# Patient Record
Sex: MALE | Race: WHITE | Employment: OTHER | URBAN - NONMETROPOLITAN AREA
[De-identification: names, ages, dates, MRNs, and addresses within clinical notes are randomized per-mention and may not be internally consistent; named-entity substitution may affect disease eponyms.]

---

## 2017-01-04 ENCOUNTER — TELEPHONE (OUTPATIENT)
Dept: INTERNAL MEDICINE | Age: 47
End: 2017-01-04

## 2017-01-17 ENCOUNTER — TELEPHONE (OUTPATIENT)
Dept: ENDOCRINOLOGY | Age: 47
End: 2017-01-17

## 2017-01-18 ENCOUNTER — TELEPHONE (OUTPATIENT)
Dept: OTHER | Age: 47
End: 2017-01-18

## 2017-01-20 DIAGNOSIS — E78.5 HYPERLIPIDEMIA WITH TARGET LDL LESS THAN 100: Primary | ICD-10-CM

## 2017-01-20 PROBLEM — G56.01 CARPAL TUNNEL SYNDROME OF RIGHT WRIST: Status: ACTIVE | Noted: 2017-01-20

## 2017-01-20 PROBLEM — M75.21 BICIPITAL TENDINITIS OF RIGHT SHOULDER: Status: ACTIVE | Noted: 2017-01-20

## 2017-01-20 RX ORDER — OMEGA-3-ACID ETHYL ESTERS 1 G/1
1 CAPSULE, LIQUID FILLED ORAL 2 TIMES DAILY
Qty: 60 CAPSULE | Refills: 3 | Status: SHIPPED | OUTPATIENT
Start: 2017-01-20 | End: 2017-03-16

## 2017-01-21 PROBLEM — E66.9 OBESITY (BMI 30-39.9): Chronic | Status: ACTIVE | Noted: 2017-01-21

## 2017-02-01 ENCOUNTER — TELEPHONE (OUTPATIENT)
Dept: ENDOCRINOLOGY | Age: 47
End: 2017-02-01

## 2017-02-27 ENCOUNTER — TELEPHONE (OUTPATIENT)
Dept: INTERNAL MEDICINE | Age: 47
End: 2017-02-27

## 2017-03-16 ENCOUNTER — OFFICE VISIT (OUTPATIENT)
Dept: INTERNAL MEDICINE | Age: 47
End: 2017-03-16

## 2017-03-16 VITALS
WEIGHT: 285 LBS | HEART RATE: 100 BPM | BODY MASS INDEX: 35.43 KG/M2 | SYSTOLIC BLOOD PRESSURE: 122 MMHG | HEIGHT: 75 IN | DIASTOLIC BLOOD PRESSURE: 90 MMHG

## 2017-03-16 DIAGNOSIS — G25.81 RESTLESS LEGS SYNDROME (RLS): ICD-10-CM

## 2017-03-16 DIAGNOSIS — G62.9 NEUROPATHY: ICD-10-CM

## 2017-03-16 DIAGNOSIS — K21.9 GASTROESOPHAGEAL REFLUX DISEASE WITHOUT ESOPHAGITIS: ICD-10-CM

## 2017-03-16 DIAGNOSIS — E78.2 MIXED HYPERLIPIDEMIA: ICD-10-CM

## 2017-03-16 DIAGNOSIS — I10 ESSENTIAL HYPERTENSION: Primary | ICD-10-CM

## 2017-03-16 DIAGNOSIS — F99 INSOMNIA DUE TO OTHER MENTAL DISORDER: ICD-10-CM

## 2017-03-16 DIAGNOSIS — R11.2 NON-INTRACTABLE VOMITING WITH NAUSEA, UNSPECIFIED VOMITING TYPE: ICD-10-CM

## 2017-03-16 DIAGNOSIS — F51.05 INSOMNIA DUE TO OTHER MENTAL DISORDER: ICD-10-CM

## 2017-03-16 PROCEDURE — G8484 FLU IMMUNIZE NO ADMIN: HCPCS | Performed by: INTERNAL MEDICINE

## 2017-03-16 PROCEDURE — 1036F TOBACCO NON-USER: CPT | Performed by: INTERNAL MEDICINE

## 2017-03-16 PROCEDURE — G8417 CALC BMI ABV UP PARAM F/U: HCPCS | Performed by: INTERNAL MEDICINE

## 2017-03-16 PROCEDURE — 99213 OFFICE O/P EST LOW 20 MIN: CPT | Performed by: INTERNAL MEDICINE

## 2017-03-16 PROCEDURE — G8427 DOCREV CUR MEDS BY ELIG CLIN: HCPCS | Performed by: INTERNAL MEDICINE

## 2017-03-16 RX ORDER — CYCLOBENZAPRINE HCL 10 MG
10 TABLET ORAL 3 TIMES DAILY PRN
COMMUNITY
End: 2017-04-10 | Stop reason: ALTCHOICE

## 2017-03-16 RX ORDER — HYDROCODONE BITARTRATE AND ACETAMINOPHEN 5; 325 MG/1; MG/1
1 TABLET ORAL EVERY 6 HOURS PRN
COMMUNITY
End: 2017-04-06 | Stop reason: ALTCHOICE

## 2017-03-17 RX ORDER — PROMETHAZINE HYDROCHLORIDE 12.5 MG/1
12.5-25 TABLET ORAL EVERY 8 HOURS PRN
Qty: 60 TABLET | Refills: 2 | Status: SHIPPED | OUTPATIENT
Start: 2017-03-17 | End: 2017-12-07 | Stop reason: SDUPTHER

## 2017-03-17 RX ORDER — METOPROLOL TARTRATE 100 MG/1
100 TABLET ORAL 2 TIMES DAILY
Qty: 180 TABLET | Refills: 1 | Status: SHIPPED | OUTPATIENT
Start: 2017-03-17 | End: 2017-12-07 | Stop reason: ALTCHOICE

## 2017-03-17 RX ORDER — FENOFIBRATE 145 MG/1
145 TABLET, COATED ORAL DAILY
Qty: 90 TABLET | Refills: 1 | Status: SHIPPED | OUTPATIENT
Start: 2017-03-17 | End: 2017-12-07 | Stop reason: SDUPTHER

## 2017-03-17 RX ORDER — PANTOPRAZOLE SODIUM 40 MG/1
40 TABLET, DELAYED RELEASE ORAL 2 TIMES DAILY
Qty: 180 TABLET | Refills: 1 | Status: SHIPPED | OUTPATIENT
Start: 2017-03-17 | End: 2017-08-17 | Stop reason: SDUPTHER

## 2017-03-17 RX ORDER — ROPINIROLE 1 MG/1
TABLET, FILM COATED ORAL
Qty: 90 TABLET | Refills: 5 | Status: SHIPPED | OUTPATIENT
Start: 2017-03-17 | End: 2017-08-17 | Stop reason: SDUPTHER

## 2017-03-17 RX ORDER — GABAPENTIN 600 MG/1
600 TABLET ORAL 4 TIMES DAILY
Qty: 360 TABLET | Refills: 1 | Status: SHIPPED | OUTPATIENT
Start: 2017-03-17 | End: 2017-08-17 | Stop reason: SDUPTHER

## 2017-04-03 ENCOUNTER — TELEPHONE (OUTPATIENT)
Dept: INTERNAL MEDICINE | Age: 47
End: 2017-04-03

## 2017-04-06 ENCOUNTER — OFFICE VISIT (OUTPATIENT)
Dept: ENDOCRINOLOGY | Age: 47
End: 2017-04-06

## 2017-04-06 VITALS
HEART RATE: 89 BPM | SYSTOLIC BLOOD PRESSURE: 121 MMHG | BODY MASS INDEX: 36.06 KG/M2 | WEIGHT: 290 LBS | HEIGHT: 75 IN | DIASTOLIC BLOOD PRESSURE: 74 MMHG | RESPIRATION RATE: 18 BRPM

## 2017-04-06 DIAGNOSIS — G62.9 NEUROPATHY: ICD-10-CM

## 2017-04-06 DIAGNOSIS — E11.40 TYPE 2 DIABETES MELLITUS WITH DIABETIC NEUROPATHY, UNSPECIFIED LONG TERM INSULIN USE STATUS: Primary | ICD-10-CM

## 2017-04-06 DIAGNOSIS — E78.5 HYPERLIPIDEMIA WITH TARGET LDL LESS THAN 100: ICD-10-CM

## 2017-04-06 PROCEDURE — G8427 DOCREV CUR MEDS BY ELIG CLIN: HCPCS | Performed by: INTERNAL MEDICINE

## 2017-04-06 PROCEDURE — G8417 CALC BMI ABV UP PARAM F/U: HCPCS | Performed by: INTERNAL MEDICINE

## 2017-04-06 PROCEDURE — 3045F PR MOST RECENT HEMOGLOBIN A1C LEVEL 7.0-9.0%: CPT | Performed by: INTERNAL MEDICINE

## 2017-04-06 PROCEDURE — 99214 OFFICE O/P EST MOD 30 MIN: CPT | Performed by: INTERNAL MEDICINE

## 2017-04-06 PROCEDURE — 1036F TOBACCO NON-USER: CPT | Performed by: INTERNAL MEDICINE

## 2017-04-06 RX ORDER — OMEGA-3-ACID ETHYL ESTERS 1 G/1
2 CAPSULE, LIQUID FILLED ORAL 2 TIMES DAILY
Qty: 120 CAPSULE | Refills: 3 | Status: SHIPPED | OUTPATIENT
Start: 2017-04-06 | End: 2017-05-15 | Stop reason: SDUPTHER

## 2017-04-10 ENCOUNTER — OFFICE VISIT (OUTPATIENT)
Dept: INTERNAL MEDICINE | Age: 47
End: 2017-04-10

## 2017-04-10 VITALS
WEIGHT: 285 LBS | HEIGHT: 75 IN | HEART RATE: 80 BPM | SYSTOLIC BLOOD PRESSURE: 128 MMHG | DIASTOLIC BLOOD PRESSURE: 90 MMHG | BODY MASS INDEX: 35.43 KG/M2

## 2017-04-10 DIAGNOSIS — M54.2 CERVICAL SPINE PAIN: Primary | ICD-10-CM

## 2017-04-10 DIAGNOSIS — M48.02 CERVICAL STENOSIS OF SPINE: ICD-10-CM

## 2017-04-10 DIAGNOSIS — R22.31 LUMP OF AXILLA, RIGHT: ICD-10-CM

## 2017-04-10 PROCEDURE — G8427 DOCREV CUR MEDS BY ELIG CLIN: HCPCS | Performed by: INTERNAL MEDICINE

## 2017-04-10 PROCEDURE — 1036F TOBACCO NON-USER: CPT | Performed by: INTERNAL MEDICINE

## 2017-04-10 PROCEDURE — G8417 CALC BMI ABV UP PARAM F/U: HCPCS | Performed by: INTERNAL MEDICINE

## 2017-04-10 PROCEDURE — 99213 OFFICE O/P EST LOW 20 MIN: CPT | Performed by: INTERNAL MEDICINE

## 2017-04-10 RX ORDER — MIRTAZAPINE 15 MG/1
15 TABLET, FILM COATED ORAL NIGHTLY
COMMUNITY
End: 2017-07-18

## 2017-05-09 ENCOUNTER — TELEPHONE (OUTPATIENT)
Dept: ENDOCRINOLOGY | Age: 47
End: 2017-05-09

## 2017-05-12 ENCOUNTER — TELEPHONE (OUTPATIENT)
Dept: ENDOCRINOLOGY | Age: 47
End: 2017-05-12

## 2017-05-15 ENCOUNTER — OFFICE VISIT (OUTPATIENT)
Dept: ENDOCRINOLOGY | Age: 47
End: 2017-05-15

## 2017-05-15 VITALS
HEIGHT: 75 IN | DIASTOLIC BLOOD PRESSURE: 85 MMHG | SYSTOLIC BLOOD PRESSURE: 136 MMHG | BODY MASS INDEX: 35.99 KG/M2 | HEART RATE: 95 BPM | RESPIRATION RATE: 16 BRPM | WEIGHT: 289.5 LBS

## 2017-05-15 DIAGNOSIS — E78.1 HYPERTRIGLYCERIDEMIA: ICD-10-CM

## 2017-05-15 DIAGNOSIS — E11.65 TYPE 2 DIABETES MELLITUS WITH HYPERGLYCEMIA, WITH LONG-TERM CURRENT USE OF INSULIN (HCC): Primary | ICD-10-CM

## 2017-05-15 DIAGNOSIS — E11.40 TYPE 2 DIABETES MELLITUS WITH DIABETIC NEUROPATHY, UNSPECIFIED LONG TERM INSULIN USE STATUS: ICD-10-CM

## 2017-05-15 DIAGNOSIS — Z79.4 TYPE 2 DIABETES MELLITUS WITH HYPERGLYCEMIA, WITH LONG-TERM CURRENT USE OF INSULIN (HCC): Primary | ICD-10-CM

## 2017-05-15 PROCEDURE — G8417 CALC BMI ABV UP PARAM F/U: HCPCS | Performed by: CLINICAL NURSE SPECIALIST

## 2017-05-15 PROCEDURE — 3045F PR MOST RECENT HEMOGLOBIN A1C LEVEL 7.0-9.0%: CPT | Performed by: CLINICAL NURSE SPECIALIST

## 2017-05-15 PROCEDURE — 99214 OFFICE O/P EST MOD 30 MIN: CPT | Performed by: CLINICAL NURSE SPECIALIST

## 2017-05-15 PROCEDURE — 1036F TOBACCO NON-USER: CPT | Performed by: CLINICAL NURSE SPECIALIST

## 2017-05-15 PROCEDURE — G8427 DOCREV CUR MEDS BY ELIG CLIN: HCPCS | Performed by: CLINICAL NURSE SPECIALIST

## 2017-05-15 RX ORDER — OMEGA-3-ACID ETHYL ESTERS 1 G/1
2 CAPSULE, LIQUID FILLED ORAL 2 TIMES DAILY
Qty: 60 CAPSULE | Refills: 3 | Status: SHIPPED | OUTPATIENT
Start: 2017-05-15 | End: 2017-05-16

## 2017-05-15 RX ORDER — OMEGA-3-ACID ETHYL ESTERS 1 G/1
2 CAPSULE, LIQUID FILLED ORAL 2 TIMES DAILY
Qty: 60 CAPSULE | Refills: 3 | Status: SHIPPED | OUTPATIENT
Start: 2017-05-15 | End: 2017-05-15 | Stop reason: SDUPTHER

## 2017-05-15 ASSESSMENT — ENCOUNTER SYMPTOMS
SHORTNESS OF BREATH: 0
DIARRHEA: 0
VOICE CHANGE: 0
EYE REDNESS: 0
COUGH: 0
ABDOMINAL PAIN: 0
CONSTIPATION: 0
CHEST TIGHTNESS: 0
NAUSEA: 0
WHEEZING: 0

## 2017-05-16 ENCOUNTER — TELEPHONE (OUTPATIENT)
Dept: ENDOCRINOLOGY | Age: 47
End: 2017-05-16

## 2017-05-16 DIAGNOSIS — E11.40 TYPE 2 DIABETES MELLITUS WITH DIABETIC NEUROPATHY, UNSPECIFIED LONG TERM INSULIN USE STATUS: Primary | ICD-10-CM

## 2017-05-16 RX ORDER — OMEGA-3-ACID ETHYL ESTERS 1 G/1
2 CAPSULE, LIQUID FILLED ORAL 2 TIMES DAILY
Qty: 120 CAPSULE | Refills: 3 | Status: SHIPPED | OUTPATIENT
Start: 2017-05-16 | End: 2018-10-16 | Stop reason: SDUPTHER

## 2017-05-20 DIAGNOSIS — E78.5 HYPERLIPIDEMIA WITH TARGET LDL LESS THAN 100: ICD-10-CM

## 2017-05-22 RX ORDER — ATORVASTATIN CALCIUM 10 MG/1
TABLET, FILM COATED ORAL
Qty: 30 TABLET | Refills: 5 | Status: SHIPPED | OUTPATIENT
Start: 2017-05-22 | End: 2018-10-16 | Stop reason: SDUPTHER

## 2017-05-23 ENCOUNTER — TELEPHONE (OUTPATIENT)
Dept: ENDOCRINOLOGY | Age: 47
End: 2017-05-23

## 2017-05-23 DIAGNOSIS — Z79.4 UNCONTROLLED TYPE 2 DIABETES MELLITUS WITH HYPERGLYCEMIA, WITH LONG-TERM CURRENT USE OF INSULIN (HCC): Primary | ICD-10-CM

## 2017-05-23 DIAGNOSIS — E11.65 UNCONTROLLED TYPE 2 DIABETES MELLITUS WITH HYPERGLYCEMIA, WITH LONG-TERM CURRENT USE OF INSULIN (HCC): Primary | ICD-10-CM

## 2017-05-30 ENCOUNTER — TELEPHONE (OUTPATIENT)
Dept: INTERNAL MEDICINE | Age: 47
End: 2017-05-30

## 2017-06-22 ENCOUNTER — TELEPHONE (OUTPATIENT)
Dept: ENDOCRINOLOGY | Age: 47
End: 2017-06-22

## 2017-06-27 ENCOUNTER — TELEPHONE (OUTPATIENT)
Dept: ENDOCRINOLOGY | Age: 47
End: 2017-06-27

## 2017-06-27 DIAGNOSIS — E11.40 TYPE 2 DIABETES MELLITUS WITH DIABETIC NEUROPATHY, UNSPECIFIED LONG TERM INSULIN USE STATUS: Primary | ICD-10-CM

## 2017-07-07 PROBLEM — R22.30 LUMP OF AXILLA: Status: ACTIVE | Noted: 2017-07-07

## 2017-07-11 ENCOUNTER — TELEPHONE (OUTPATIENT)
Dept: INTERNAL MEDICINE | Age: 47
End: 2017-07-11

## 2017-08-17 ENCOUNTER — OFFICE VISIT (OUTPATIENT)
Dept: INTERNAL MEDICINE CLINIC | Age: 47
End: 2017-08-17
Payer: MEDICARE

## 2017-08-17 VITALS
SYSTOLIC BLOOD PRESSURE: 114 MMHG | BODY MASS INDEX: 34.69 KG/M2 | WEIGHT: 279 LBS | HEIGHT: 75 IN | HEART RATE: 70 BPM | DIASTOLIC BLOOD PRESSURE: 72 MMHG

## 2017-08-17 DIAGNOSIS — G25.81 RESTLESS LEGS SYNDROME (RLS): ICD-10-CM

## 2017-08-17 DIAGNOSIS — K21.9 GASTROESOPHAGEAL REFLUX DISEASE WITHOUT ESOPHAGITIS: ICD-10-CM

## 2017-08-17 DIAGNOSIS — E78.5 HYPERLIPIDEMIA WITH TARGET LDL LESS THAN 100: ICD-10-CM

## 2017-08-17 DIAGNOSIS — I10 ESSENTIAL HYPERTENSION: ICD-10-CM

## 2017-08-17 DIAGNOSIS — R79.89 LOW TESTOSTERONE LEVEL IN MALE: Primary | ICD-10-CM

## 2017-08-17 DIAGNOSIS — G62.9 NEUROPATHY: ICD-10-CM

## 2017-08-17 DIAGNOSIS — Z12.5 PROSTATE CANCER SCREENING: ICD-10-CM

## 2017-08-17 PROCEDURE — 99214 OFFICE O/P EST MOD 30 MIN: CPT | Performed by: INTERNAL MEDICINE

## 2017-08-17 PROCEDURE — 1036F TOBACCO NON-USER: CPT | Performed by: INTERNAL MEDICINE

## 2017-08-17 PROCEDURE — G8417 CALC BMI ABV UP PARAM F/U: HCPCS | Performed by: INTERNAL MEDICINE

## 2017-08-17 PROCEDURE — G8427 DOCREV CUR MEDS BY ELIG CLIN: HCPCS | Performed by: INTERNAL MEDICINE

## 2017-08-17 RX ORDER — ROPINIROLE 1 MG/1
TABLET, FILM COATED ORAL
Qty: 90 TABLET | Refills: 5 | Status: SHIPPED | OUTPATIENT
Start: 2017-08-17 | End: 2017-10-05 | Stop reason: SDUPTHER

## 2017-08-17 RX ORDER — GABAPENTIN 600 MG/1
600 TABLET ORAL 4 TIMES DAILY
Qty: 360 TABLET | Refills: 1 | Status: SHIPPED | OUTPATIENT
Start: 2017-08-17 | End: 2017-10-05 | Stop reason: SDUPTHER

## 2017-08-17 RX ORDER — PANTOPRAZOLE SODIUM 40 MG/1
40 TABLET, DELAYED RELEASE ORAL 2 TIMES DAILY
Qty: 180 TABLET | Refills: 1 | Status: SHIPPED | OUTPATIENT
Start: 2017-08-17 | End: 2017-12-07 | Stop reason: SDUPTHER

## 2017-08-17 RX ORDER — LACOSAMIDE 50 MG/1
250 TABLET ORAL 2 TIMES DAILY
COMMUNITY
Start: 2017-07-19 | End: 2019-04-16 | Stop reason: DRUGHIGH

## 2017-08-18 ENCOUNTER — HOSPITAL ENCOUNTER (OUTPATIENT)
Age: 47
Discharge: HOME | End: 2017-08-18
Payer: MEDICARE

## 2017-08-18 DIAGNOSIS — Z12.5 PROSTATE CANCER SCREENING: ICD-10-CM

## 2017-08-18 DIAGNOSIS — R79.89 LOW TESTOSTERONE LEVEL IN MALE: ICD-10-CM

## 2017-08-18 LAB — PROSTATE SPECIFIC ANTIGEN: 0.28 NG/ML (ref 0–1)

## 2017-08-18 PROCEDURE — 84270 ASSAY OF SEX HORMONE GLOBUL: CPT

## 2017-08-18 PROCEDURE — 36415 COLL VENOUS BLD VENIPUNCTURE: CPT

## 2017-08-18 PROCEDURE — G0103 PSA SCREENING: HCPCS

## 2017-08-18 PROCEDURE — 84403 ASSAY OF TOTAL TESTOSTERONE: CPT

## 2017-08-19 LAB — TESTOSTERONE FREE: NORMAL

## 2017-08-21 ENCOUNTER — TELEPHONE (OUTPATIENT)
Dept: INTERNAL MEDICINE CLINIC | Age: 47
End: 2017-08-21

## 2017-08-31 ENCOUNTER — TELEPHONE (OUTPATIENT)
Dept: INTERNAL MEDICINE CLINIC | Age: 47
End: 2017-08-31

## 2017-08-31 ENCOUNTER — CARE COORDINATION (OUTPATIENT)
Dept: CARE COORDINATION | Age: 47
End: 2017-08-31

## 2017-08-31 RX ORDER — SILDENAFIL 50 MG/1
50 TABLET, FILM COATED ORAL PRN
COMMUNITY
End: 2017-09-05 | Stop reason: DRUGHIGH

## 2017-09-05 RX ORDER — SILDENAFIL 100 MG/1
100 TABLET, FILM COATED ORAL PRN
COMMUNITY
End: 2017-09-05 | Stop reason: SDUPTHER

## 2017-09-05 RX ORDER — SILDENAFIL 100 MG/1
100 TABLET, FILM COATED ORAL PRN
Qty: 45 TABLET | Refills: 3 | Status: SHIPPED | OUTPATIENT
Start: 2017-09-05 | End: 2019-01-29 | Stop reason: SDUPTHER

## 2017-10-05 ENCOUNTER — OFFICE VISIT (OUTPATIENT)
Dept: ENDOCRINOLOGY | Age: 47
End: 2017-10-05
Payer: MEDICARE

## 2017-10-05 VITALS
SYSTOLIC BLOOD PRESSURE: 126 MMHG | WEIGHT: 271 LBS | RESPIRATION RATE: 20 BRPM | BODY MASS INDEX: 33.69 KG/M2 | DIASTOLIC BLOOD PRESSURE: 79 MMHG | HEIGHT: 75 IN | HEART RATE: 77 BPM

## 2017-10-05 DIAGNOSIS — E78.2 MIXED HYPERLIPIDEMIA: ICD-10-CM

## 2017-10-05 DIAGNOSIS — R79.89 LOW VITAMIN D LEVEL: ICD-10-CM

## 2017-10-05 DIAGNOSIS — G25.81 RESTLESS LEGS SYNDROME (RLS): ICD-10-CM

## 2017-10-05 DIAGNOSIS — Z79.4 TYPE 2 DIABETES MELLITUS WITH DIABETIC NEUROPATHY, WITH LONG-TERM CURRENT USE OF INSULIN (HCC): Primary | ICD-10-CM

## 2017-10-05 DIAGNOSIS — F33.42 RECURRENT MAJOR DEPRESSIVE DISORDER, IN FULL REMISSION (HCC): ICD-10-CM

## 2017-10-05 DIAGNOSIS — G62.9 NEUROPATHY: ICD-10-CM

## 2017-10-05 DIAGNOSIS — E11.40 TYPE 2 DIABETES MELLITUS WITH DIABETIC NEUROPATHY, WITH LONG-TERM CURRENT USE OF INSULIN (HCC): Primary | ICD-10-CM

## 2017-10-05 DIAGNOSIS — I15.2 HYPERTENSION ASSOCIATED WITH DIABETES (HCC): ICD-10-CM

## 2017-10-05 DIAGNOSIS — E11.59 HYPERTENSION ASSOCIATED WITH DIABETES (HCC): ICD-10-CM

## 2017-10-05 PROCEDURE — G8427 DOCREV CUR MEDS BY ELIG CLIN: HCPCS | Performed by: INTERNAL MEDICINE

## 2017-10-05 PROCEDURE — 3046F HEMOGLOBIN A1C LEVEL >9.0%: CPT | Performed by: INTERNAL MEDICINE

## 2017-10-05 PROCEDURE — 99214 OFFICE O/P EST MOD 30 MIN: CPT | Performed by: INTERNAL MEDICINE

## 2017-10-05 PROCEDURE — G8484 FLU IMMUNIZE NO ADMIN: HCPCS | Performed by: INTERNAL MEDICINE

## 2017-10-05 PROCEDURE — G8417 CALC BMI ABV UP PARAM F/U: HCPCS | Performed by: INTERNAL MEDICINE

## 2017-10-05 PROCEDURE — 1036F TOBACCO NON-USER: CPT | Performed by: INTERNAL MEDICINE

## 2017-10-05 NOTE — LETTER
[facility]  [address] [facility]  [address]  Phone: [phone]  Fax: [phone]    Yogi Triana MD      10/09/17    Dr. Shanel Grady    Patient: Holly Rivas  MR Number: [id number]  YOB: 1970  Date of Visit: 10/5/2017      Dear Dr. Salazar Space you for the request for consultation for Holly Rivas to me for the evaluation of   Chief Complaint   Patient presents with    Diabetes     Type 2     Eye Exam     6/16    Foot Problem     Both feet having problems with tingling and numbness but no open sores.  Discuss Labs     5/23/17   . Below are the relevant portions of my assessment and plan of care. Subjective:     59-year-old male being followed for type 2 diabetes mellitus and hyperlipidemia. The patient was diagnosed with diabetes mellitus in 1999. His blood sugar has historically been very difficult to regulate due to insulin resistance. Current regiment consists of Levemir 75 units/57, NovoLog 30/30/20+ scale and invokana 100 mg daily. His blood sugars have improved drastically since we added invokana, with majority of readings ranging between 150 and 250. He reports no episodes of hypoglycemia. Patient is checking blood sugars 2-3 times a day. He denies polyuria and polydipsia. He denies having any visual problems. Patient denies having any open sores in the feet. There is no chest pain or shortness of breath. This patient also has mixed hyperlipidemia disorder with extremely high triglycerides, for which he is now taking TriCor 145, Lovaza 2 g twice a day and Lipitor 10 mg daily. Patient tolerates these medications with no musculoskeletal side effects. He has not had any problems with bleeding. Patient denies chest pain or shortness of breath. The patient is also being followed for hypertension and low vitamin D.     Past Medical History:   Diagnosis Date    Acid reflux     Anemia Component Value Date    TRIG 348 (H) 10/06/2017    TRIG 246 (H) 06/26/2017    TRIG 1,140 (H) 05/12/2017     Lab Results   Component Value Date    HDL 32 10/06/2017    HDL 26 06/26/2017    HDL 22 05/12/2017     Lab Results   Component Value Date    LDLCALC 64 10/06/2017    LDLCALC 36 06/26/2017    [address] SEE BELOW 05/12/2017     No results found for: LABVLDL, VLDL  No results found for: CHOLHDLRATIO      Review of Systems  Constitutional: negative for chills, fatigue and fevers  Eyes: negative for irritation, redness and visual disturbance  Respiratory: negative for cough, shortness of breath and wheezing  Cardiovascular: negative for chest pain, irregular heart beat and palpitations    The remainder of systems were reviewed and negative.      Objective:   /79 (Site: Right Arm)   Pulse 77   Resp 20   Ht 6' 3\" (1.905 m)   Wt 271 lb (122.9 kg)   BMI 33.87 kg/m²      General:  alert, appears stated age, cooperative and no distress   Oropharynx: normal findings: lips normal without lesions, buccal mucosa normal, gums healthy and tongue midline and normal    Eyes:  negative findings: lids and lashes normal, conjunctivae and sclerae normal, corneas clear and pupils equal, round, reactive to light and accomodation       Neck: no adenopathy, no carotid bruit, no JVD, supple, symmetrical, trachea midline and thyroid not enlarged, symmetric, no tenderness/mass/nodules   Thyroid:  no palpable nodule   Lung: clear to auscultation bilaterally   Heart:  regular rate and rhythm, S1, S2 normal, no murmur, click, rub or gallop and normal apical impulse   Abdomen: soft, non-tender; bowel sounds normal; no masses,  no organomegaly   Extremities: extremities normal, atraumatic, no cyanosis or edema and Homans sign is negative, no sign of DVT   Pulses: 2+ and symmetric   Skin: warm and dry, no hyperpigmentation, vitiligo, or suspicious lesions   Neuro: normal without focal findings, mental status, speech normal, alert

## 2017-10-05 NOTE — LETTER
previously seen by Dr. Edna Diaz (due to enlarged spleen)    Bipolar disorder ([address]     Blood clot in vein 4/7/16    [place] [facility]     Chest pain     previously seeing [address] [place] [address] cardiologist, now seeing Dr. Barbara Tai (LAD bridging on cath 4/2016)    Chronic back pain     Chronic bronchitis ([address]     Dr. Mitchell Men 10/2012 - no longer following with him    Colon polyp 08/30/2016    Depression     Dr. Eckert Lacho DVT (deep venous thrombosis) ([facility] [id number]    not on Coumadin due to unable to regulate - Dr. Clark Rizo - no etiology found per patient    Esophageal abnormality     nodule     Fatty liver disease, nonalcoholic     U/S 40/6[facility]    Furuncle     legs    History of Doppler ultrasound 5-29-11    No hemodynamically significant carotid stenosis is identified. A thyroid nodule on each side. Dedicated ultrasound of thyroid gland is suggested to further evaluate if clinically indicated.      Hx of blood clots     Hyperlipidemia     severely elevated triglycerides    Hypertension     diastolic    Intracranial arachnoid cyst 11/2012    Dr. Liz Quiroga drained, complicated with seizures, DKA    Irritable bowel syndrome     Liver disease     Ekaterina Aase - elevated LFT - positive smooth muscle antibody, steatosis per liver bx 3/2014 with Dr. Tono Collazo (multiple drug resistant organisms) resistance 2012    MRSA (methicillin resistant staph aureus) culture positive     h/o in foot and before brain surgery    Nephrolithiasis     noted on CT abdomen 9/2016    Neuropathy ([address]     Pancreatic insufficiency     Positive SANDY (antinuclear antibody)     Dr. Megan Jenkins - first visit in 6/2013    Pulmonary embolism [facility]) 2007    s/p GFF, related to knee surgery    Restless legs syndrome     Seizures ([address]     Sinus tachycardia     Holter 3/2013 - seeing Dr. Keiry Ferguson fracture [facility])     clips     Sleep apnea     multiple MD's suggested testing but he refuses to be tested as claustraphobic and won't use Cpap Medication Sig Dispense Refill    insulin aspart (NOVOLOG FLEXPEN) 100 UNIT/ML injection pen Inject 30 Units into the skin 3 times daily (before meals) Plus sliding scale 4:50 > 150 mg/dl 10 Pen 5    insulin detemir (LEVEMIR FLEXTOUCH) 100 UNIT/ML injection pen 75 units every AM and 57 units every PM 7 Pen 5    glucose blood VI test strips (ONE TOUCH TEST STRIPS) strip Use to test blood glucose level 4 times per day. Diagnosis: E11.40 120 each 5    canagliflozin (INVOKANA) 300 MG TABS tablet TAKE 1 TABLET BY MOUTH IN THE MORNING BEFORE breakfast 30 tablet 5    sildenafil (VIAGRA) 100 MG tablet Take 1 tablet by mouth as needed for Erectile Dysfunction 45 tablet 3    lacosamide (VIMPAT) 50 MG TABS tablet Take 50 mg by mouth 2 times daily Takes with 100 mg size for 250 mg bid.  pantoprazole (PROTONIX) 40 MG tablet Take 1 tablet by mouth 2 times daily 180 tablet 1    atorvastatin (LIPITOR) 10 MG tablet TAKE 1 TABLET BY MOUTH ONE TIME A DAY  30 tablet 5    omega-3 acid ethyl esters (LOVAZA) 1 G capsule Take 2 capsules by mouth 2 times daily 120 capsule 3    Blood Glucose Monitoring Suppl (ONE TOUCH BASIC SYSTEM) W/DEVICE KIT Use to test blood glucose level 4 times per day. Diagnosis: E11.40 1 kit 0    promethazine (PHENERGAN) 12.5 MG tablet Take 1-2 tablets by mouth every 8 hours as needed for Nausea 60 tablet 2    fenofibrate (TRICOR) 145 MG tablet Take 1 tablet by mouth daily 90 tablet 1    metoprolol (LOPRESSOR) 100 MG tablet Take 1 tablet by mouth 2 times daily 180 tablet 1    furosemide (LASIX) 20 MG tablet Take 1 tablet by mouth daily (Patient taking differently: Take 20 mg by mouth daily as needed (for swelling) ) 90 tablet 3    CREON 12395 UNITS CPEP 2 capsules 4 times daily (before meals and nightly)       clindamycin (CLEOCIN T) 1 % external solution Apply topically 2 times daily.  (Patient taking differently: as needed Apply topically 2 times daily. ) 30 mL 5 Component Value Date    TRIG 348 (H) 10/06/2017    TRIG 246 (H) 06/26/2017    TRIG 1,140 (H) 05/12/2017     Lab Results   Component Value Date    HDL 32 10/06/2017    HDL 26 06/26/2017    HDL 22 05/12/2017     Lab Results   Component Value Date    LDLCALC 64 10/06/2017    LDLCALC 36 06/26/2017    [address] SEE BELOW 05/12/2017     No results found for: LABVLDL, VLDL  No results found for: CHOLHDLRATIO      Review of Systems  Constitutional: negative for chills, fatigue and fevers  Eyes: negative for irritation, redness and visual disturbance  Respiratory: negative for cough, shortness of breath and wheezing  Cardiovascular: negative for chest pain, irregular heart beat and palpitations    The remainder of systems were reviewed and negative.      Objective:   /79 (Site: Right Arm)   Pulse 77   Resp 20   Ht 6' 3\" (1.905 m)   Wt 271 lb (122.9 kg)   BMI 33.87 kg/m²      General:  alert, appears stated age, cooperative and no distress   Oropharynx: normal findings: lips normal without lesions, buccal mucosa normal, gums healthy and tongue midline and normal    Eyes:  negative findings: lids and lashes normal, conjunctivae and sclerae normal, corneas clear and pupils equal, round, reactive to light and accomodation       Neck: no adenopathy, no carotid bruit, no JVD, supple, symmetrical, trachea midline and thyroid not enlarged, symmetric, no tenderness/mass/nodules   Thyroid:  no palpable nodule   Lung: clear to auscultation bilaterally   Heart:  regular rate and rhythm, S1, S2 normal, no murmur, click, rub or gallop and normal apical impulse   Abdomen: soft, non-tender; bowel sounds normal; no masses,  no organomegaly   Extremities: extremities normal, atraumatic, no cyanosis or edema and Homans sign is negative, no sign of DVT   Pulses: 2+ and symmetric   Skin: warm and dry, no hyperpigmentation, vitiligo, or suspicious lesions   Neuro: normal without focal findings, mental status, speech normal, alert and oriented x3, MAGDALENA, cranial nerves 2-12 intact, muscle tone and strength normal and symmetric. Assessment/Plan:     1. Type 2 diabetes mellitus with diabetic neuropathy, with long-term current use of insulin ([address]): Improvement. I would like for him to work more on diet and exercise. Hold off on adjusting medication. We reviewed side effects of interval, including yeast infections, UTI, hypokalemia, dehydration, bone loss and lower extremity amputations. Patient understands and wishes to stay on this medication. I asked him to check blood sugar 3-4 times a day and send readings to me in 2 weeks. 2. Hypertension associated with diabetes ([address] : Acceptable control for now. 3. Mixed hyperlipidemia: Improved although triglycerides are still elevated. He will needs to intensify his diet. We'll continue to monitor lipids. 4. Low vitamin D level: Should with multiple risk factors. Vitamin D levels will be monitored. Orders Placed This Encounter   Procedures    Microalbumin / Creatinine Urine Ratio     Standing Status:   Future     Standing Expiration Date:   10/5/2018    Lipid Panel     Standing Status:   Future     Standing Expiration Date:   10/5/2018     Order Specific Question:   Is Patient Fasting?/# of Hours     Answer:   12 hours    Hemoglobin A1C     Standing Status:   Future     Standing Expiration Date:   10/5/2018    TSH without Reflex     Standing Status:   Future     Standing Expiration Date:   10/5/2018    T4, Free     Standing Status:   Future     Standing Expiration Date:   10/5/2018    Comprehensive Metabolic Panel     Standing Status:   Future     Standing Expiration Date:   10/5/2018    Vitamin D 25 Hydroxy     Standing Status:   Future     Standing Expiration Date:   10/5/2018       · The risks and benefits of my recommendations, as well as other treatment options were discussed with the patient today. Questions were answered. · Follow up: 3 months and as needed. If you have questions, please do not hesitate to call me. I look forward to following Lion along with you.     Sincerely,        Gustavus Paget, MD

## 2017-10-05 NOTE — MR AVS SNAPSHOT
TSH without Reflex [NMO788 Custom]  1/3/2018 10/5/2018    Vitamin D 25 Hydroxy [PLD021 Custom]  1/3/2018 10/5/2018         Information from Your Visit        Department     Name Address Phone Fax    Endocrine Diabetes [facility] [address]  [address]      You Were Seen for:         Comments    Type 2 diabetes mellitus with diabetic neuropathy, with long-term current use of insulin ([facility])   [6740628]         Vital Signs     Blood Pressure Pulse Respirations Height Weight Body Mass Index    126/79 (Site: Right Arm) 77 20 6' 3\" (1.905 m) 271 lb (122.9 kg) 33.87 kg/m2    Smoking Status                   Never Smoker           Additional Information about your Body Mass Index (BMI)           Your BMI as listed above is considered obese (30 or more). BMI is an estimate of body fat, calculated from your height and weight. The higher your BMI, the greater your risk of heart disease, high blood pressure, type 2 diabetes, stroke, gallstones, arthritis, sleep apnea, and certain cancers. BMI is not perfect. It may overestimate body fat in athletes and people who are more muscular. Even a small weight loss (between 5 and 10 percent of your current weight) by decreasing your calorie intake and becoming more physically active will help lower your risk of developing or worsening diseases associated with obesity. Learn more at: [email or web address].co.[email or web address]             Today's Medication Changes          These changes are accurate as of: 10/5/17 12:15 PM.  If you have any questions, ask your nurse or doctor. CHANGE how you take these medications           canagliflozin 300 MG Tabs tablet   Commonly known as:  INVOKANA   Instructions:  TAKE 1 TABLET BY MOUTH IN THE MORNING BEFORE breakfast   Quantity:  30 tablet   Refills:  5   What changed:  See the new instructions.    Changed by:  Vanessa Arreaga MD Where to Get Your Medications      These medications were sent to [facility] [address] - [place], [place] - [address] [facility] -  [phone] [address], [place] [address]     Phone:  [phone]     canagliflozin 300 MG Tabs tablet    glucose blood VI test strips strip    insulin aspart 100 UNIT/ML injection pen    insulin detemir 100 UNIT/ML injection pen               Your Current Medications Are              insulin aspart (NOVOLOG FLEXPEN) 100 UNIT/ML injection pen Inject 30 Units into the skin 3 times daily (before meals) Plus sliding scale 4:50 > 150 mg/dl    insulin detemir (LEVEMIR FLEXTOUCH) 100 UNIT/ML injection pen 75 units every AM and 57 units every PM    glucose blood VI test strips (ONE TOUCH TEST STRIPS) strip Use to test blood glucose level 4 times per day. Diagnosis: E11.40    canagliflozin (INVOKANA) 300 MG TABS tablet TAKE 1 TABLET BY MOUTH IN THE MORNING BEFORE breakfast    sildenafil (VIAGRA) 100 MG tablet Take 1 tablet by mouth as needed for Erectile Dysfunction    lacosamide (VIMPAT) 50 MG TABS tablet Take 50 mg by mouth 2 times daily Takes with 100 mg size for 250 mg bid. rOPINIRole (REQUIP) 1 MG tablet TAKE 1 TABLET BY MOUTH THREE TIMES A DAY    pantoprazole (PROTONIX) 40 MG tablet Take 1 tablet by mouth 2 times daily    gabapentin (NEURONTIN) 600 MG tablet Take 1 tablet by mouth 4 times daily    atorvastatin (LIPITOR) 10 MG tablet TAKE 1 TABLET BY MOUTH ONE TIME A DAY     omega-3 acid ethyl esters (LOVAZA) 1 G capsule Take 2 capsules by mouth 2 times daily    Blood Glucose Monitoring Suppl (ONE TOUCH BASIC SYSTEM) W/DEVICE KIT Use to test blood glucose level 4 times per day.  Diagnosis: E11.40    promethazine (PHENERGAN) 12.5 MG tablet Take 1-2 tablets by mouth every 8 hours as needed for Nausea    fenofibrate (TRICOR) 145 MG tablet Take 1 tablet by mouth daily    metoprolol (LOPRESSOR) 100 MG tablet Take 1 tablet by mouth 2 times daily furosemide (LASIX) 20 MG tablet Take 1 tablet by mouth daily    CREON 74239 UNITS CPEP 2 capsules 4 times daily (before meals and nightly)     clindamycin (CLEOCIN T) 1 % external solution Apply topically 2 times daily. divalproex (DEPAKOTE) 250 MG DR tablet Take 250 mg by mouth 2 times daily    levETIRAcetam (KEPPRA) 500 MG tablet Take 2,000 mg by mouth 2 times daily     LORazepam (ATIVAN) 0.5 MG tablet Take 0.5 mg by mouth 2 times daily as needed (seizures)     lacosamide (VIMPAT) 100 MG TABS tablet 200 mg 2 times daily     zonisamide (ZONEGRAN) 100 MG capsule Take 400 mg by mouth nightly     DULoxetine (CYMBALTA) 60 MG capsule Take 1 capsule by mouth daily.       Allergies              Ciprofloxacin-ciproflox Hcl Er Other (See Comments)    Elevated creatinine    Heparin     Monitor for thrombocytopenia    Metformin Nausea Only    Abdominal pain, diarrhea    Niacin And Related Other (See Comments)    Burning sensation, severe flushing    Tramadol Hcl     Contraindication to seizure medications    Ultram [Tramadol]     Contraindication to seizure medications    Warfarin     Very difficult to regulate in past         Additional Information        Basic Information     Date Of Birth Sex Race Ethnicity Preferred Language    1970 Male White Non-/Non  English      Problem List as of 10/5/2017  Date Reviewed: 8/17/2017                Syncope and collapse    Non compliance w medication regimen    Type 2 diabetes mellitus with diabetic neuropathy (HCC)    Lump of axilla    Obesity (BMI 30-39.9) (Chronic)    Bicipital tendinitis of right shoulder    Carpal tunnel syndrome of right wrist    Obesity, Class II, BMI 35-39.9 (HCC)    Recurrent right inguinal hernia    Acute lateral meniscus tear of right knee    Gastritis    Respiratory acidosis    Headache disorder    Partial symptomatic epilepsy with complex partial seizures, not intractable, without status epilepticus ([facility]    Hypomagnesemia Hypogonadism in male    Headaches due to old head injury    Erectile dysfunction due to diseases classified elsewhere    Rash    Hypogonadism    Snoring    Lithium toxicity    Nausea & vomiting    Medication overdose, insulin    Suicide attempt    Hypoglycemia    Hypokalemia    Hyperammonemia (HCC)    Neuropathy (HCC)    Dizziness    Confusion    Seizure disorder (HCC)    Diabetes mellitus type II, uncontrolled (HCC)    Bipolar disorder    Bipolar disorder (HCC)    IBS (irritable bowel syndrome)    Tachycardia    Depression    Liver disease    Restless legs syndrome    Chronic back pain    Pulmonary embolism (HCC)    Chest pain    Acid reflux    Irritable bowel syndrome      Your Goals as of 10/5/2017 at 12:15 PM              1/21/17 12/9/16 7/22/16       Lifestyle    care coordination-self monitoring           Notes    Care Coordination Goal:  Patient Self-Monitoring  Choose a Goal:  BG monitoring: Yes     BS AC  I will notify my provider of any trends of increasing or decreasing blood sugars over 1 month period of time. LINK TO BG TRACKER HERE. Result Component    HEMOGLOBIN A1C < 7.0   8.5  8.4  8.7      Preventive Care        Date Due    HIV screening is recommended for all people regardless of risk factors  aged 15-65 years at least once (lifetime) who have never been HIV tested.  6/11/1985    Tetanus Combination Vaccine (1 - Tdap) 6/11/1989    Pneumococcal Vaccine - Pneumovax for adults aged 19-64 years with: chronic heart disease, chronic lung disease, diabetes mellitus, alcoholism, chronic liver disease, or cigarette smoking. (1 of 1 - PPSV23) 6/11/1989    Urine Check For Kidney Problems 10/16/2016    Colonoscopy 2/28/2017    Eye Exam By An Eye Doctor 6/14/2017    Yearly Flu Vaccine (1) 9/1/2017    Diabetic Foot Exam 11/30/2017    Hemoglobin A1C (Test For Long-Term Glucose Control) 1/21/2018    Cholesterol Screening 6/26/2018            [facility] Signup Our records indicate that you have an active [employer] account. You can view your After Visit Summary by going to [email or web address] and logging in with your [employer] username and password. If you don't have a [employer] username and password but a parent or guardian has access to your record, the parent or guardian should login with their own [employer] username and password and access your record to view the After Visit Summary. Additional Information  If you have questions, please contact the physician practice where you receive care. Remember, [employer] is NOT to be used for urgent needs. For medical emergencies, dial 911. For questions regarding your [employer] account call [phone]. If you have a clinical question, please call your doctor's office.

## 2017-10-06 ENCOUNTER — HOSPITAL ENCOUNTER (OUTPATIENT)
Age: 47
Discharge: HOME | End: 2017-10-06
Payer: MEDICARE

## 2017-10-06 LAB
ALBUMIN SERPL-MCNC: 4.3 G/DL (ref 3.5–5.1)
ALP BLD-CCNC: 92 U/L (ref 38–126)
ALT SERPL-CCNC: 33 U/L (ref 11–66)
ANION GAP SERPL CALCULATED.3IONS-SCNC: 13 MEQ/L (ref 8–16)
AST SERPL-CCNC: 22 U/L (ref 5–40)
AVERAGE GLUCOSE: 150 MG/DL (ref 70–126)
BILIRUB SERPL-MCNC: 1.4 MG/DL (ref 0.3–1.2)
BUN BLDV-MCNC: 12 MG/DL (ref 7–22)
CALCIUM SERPL-MCNC: 9.3 MG/DL (ref 8.5–10.5)
CHLORIDE BLD-SCNC: 102 MEQ/L (ref 98–111)
CHOLESTEROL, TOTAL: 166 MG/DL (ref 100–199)
CO2: 27 MEQ/L (ref 23–33)
CREAT SERPL-MCNC: 0.6 MG/DL (ref 0.4–1.2)
CREATININE, URINE: 104.2 MG/DL
GFR SERPL CREATININE-BSD FRML MDRD: > 90 ML/MIN/1.73M2
GLUCOSE BLD-MCNC: 148 MG/DL (ref 70–108)
HBA1C MFR BLD: 7 % (ref 4.4–6.4)
HDLC SERPL-MCNC: 32 MG/DL
LDL CHOLESTEROL CALCULATED: 64 MG/DL
MICROALBUMIN UR-MCNC: < 1.2 MG/DL
MICROALBUMIN/CREAT UR-RTO: 12 MG/G (ref 0–30)
POTASSIUM SERPL-SCNC: 3.7 MEQ/L (ref 3.5–5.2)
SODIUM BLD-SCNC: 142 MEQ/L (ref 135–145)
T4 FREE: 1.27 NG/DL (ref 0.93–1.76)
TOTAL PROTEIN: 7 G/DL (ref 6.1–8)
TRIGL SERPL-MCNC: 348 MG/DL (ref 0–199)
TSH SERPL DL<=0.05 MIU/L-ACNC: 2 UIU/ML (ref 0.4–4.2)
VITAMIN D 25-HYDROXY: 10 NG/ML (ref 30–100)

## 2017-10-06 PROCEDURE — 82043 UR ALBUMIN QUANTITATIVE: CPT

## 2017-10-06 PROCEDURE — 82306 VITAMIN D 25 HYDROXY: CPT

## 2017-10-06 PROCEDURE — 83036 HEMOGLOBIN GLYCOSYLATED A1C: CPT

## 2017-10-06 PROCEDURE — 36415 COLL VENOUS BLD VENIPUNCTURE: CPT

## 2017-10-06 PROCEDURE — 84439 ASSAY OF FREE THYROXINE: CPT

## 2017-10-06 PROCEDURE — 80053 COMPREHEN METABOLIC PANEL: CPT

## 2017-10-06 PROCEDURE — 80061 LIPID PANEL: CPT

## 2017-10-06 PROCEDURE — 84443 ASSAY THYROID STIM HORMONE: CPT

## 2017-10-07 RX ORDER — GABAPENTIN 600 MG/1
600 TABLET ORAL 4 TIMES DAILY
Qty: 360 TABLET | Refills: 0 | Status: SHIPPED | OUTPATIENT
Start: 2017-10-07 | End: 2017-12-07 | Stop reason: SDUPTHER

## 2017-10-07 RX ORDER — ROPINIROLE 1 MG/1
TABLET, FILM COATED ORAL
Qty: 270 TABLET | Refills: 0 | Status: SHIPPED | OUTPATIENT
Start: 2017-10-07 | End: 2017-12-07 | Stop reason: SDUPTHER

## 2017-10-09 ENCOUNTER — TELEPHONE (OUTPATIENT)
Dept: ENDOCRINOLOGY | Age: 47
End: 2017-10-09

## 2017-10-09 DIAGNOSIS — E55.9 HYPOVITAMINOSIS D: Primary | ICD-10-CM

## 2017-10-09 NOTE — TELEPHONE ENCOUNTER
----- Message from Florian Edwards MD sent at 10/7/2017  9:21 PM EDT -----  Start vitamin D 2000 international units daily. Get vitamin D levels in 2 months.

## 2017-10-10 RX ORDER — MULTIVIT-MIN/IRON/FOLIC ACID/K 18-600-40
2000 CAPSULE ORAL 2 TIMES DAILY
Qty: 30 CAPSULE | Refills: 0 | COMMUNITY
Start: 2017-10-10 | End: 2019-04-16 | Stop reason: ALTCHOICE

## 2017-10-10 NOTE — PROGRESS NOTES
pain     previously seeing [facility] cardiologist, now seeing Dr. Anand Diallo (LAD bridging on cath 4/2016)    Chronic back pain     Chronic bronchitis ([facility] [address]     Dr. Esther Ruano 10/2012 - no longer following with him    Colon polyp 08/30/2016    Depression     Dr. Fernanda Yepez DVT (deep venous thrombosis) ([facility] [address] [id number]    not on Coumadin due to unable to regulate - Dr. Gerhardt Duel - no etiology found per patient    Esophageal abnormality     nodule     Fatty liver disease, nonalcoholic     [facility]S 35/[facility]    Furuncle     legs    History of Doppler ultrasound 5-29-11    No hemodynamically significant carotid stenosis is identified. A thyroid nodule on each side. Dedicated ultrasound of thyroid gland is suggested to further evaluate if clinically indicated.      Hx of blood clots     Hyperlipidemia     severely elevated triglycerides    Hypertension     diastolic    Intracranial arachnoid cyst 11/2012    Dr. Mohit Figueroa drained, complicated with seizures, DKA    Irritable bowel syndrome     Liver disease     Chay Zacarias - elevated LFT - positive smooth muscle antibody, steatosis per liver bx 3/2014 with Dr. Fernandez Grew (multiple drug resistant organisms) resistance 2012    MRSA (methicillin resistant staph aureus) culture positive     h/o in foot and before brain surgery    Nephrolithiasis     noted on CT abdomen 9/2016    Neuropathy ([facility] [address]     Pancreatic insufficiency     Positive SANDY (antinuclear antibody)     Dr. Cathy Srinivasan - first visit in 6/2013    Pulmonary embolism [facility]) 2007    s/p GFF, related to knee surgery    Restless legs syndrome     Seizures ([address]     Sinus tachycardia     Holter 3/2013 - seeing Dr. Mohini Wilkerson fracture [facility])     clips     Sleep apnea     multiple MD's suggested testing but he refuses to be tested as claustraphobic and won't use Cpap    Type II or unspecified type diabetes mellitus without mention of complication, not stated as uncontrolled 2007      Past Surgical History:   Procedure Laterality Date    CARDIAC CATHETERIZATION      2011,5-6 years ago   [address]  3-2012    CARDIAC CATHETERIZATION  04/28/2016    [facility]     CARPAL TUNNEL RELEASE  01/2017    CHOLECYSTECTOMY  2004    COLONOSCOPY  2012    COLONOSCOPY  08/2016    2 tubular adenomas - reportedly needs repeat scope in 6 months    CRANIOTOMY  11/2012    arachnoid cyst drainage    EKG 12-LEAD  10/18/2015         ENDOSCOPY, COLON, DIAGNOSTIC      HERNIA REPAIR Right 1996    [facility]--Dr. Bonilla Graff INGUINAL HERNIA REPAIR Right 09/15/2016    Robotic assisted    KNEE ARTHROSCOPY Right 2016    KNEE SURGERY Left 2007    acl and debrided twice    OTHER SURGICAL HISTORY  5-31-11    Tilt table was associated w/ nonspecific symptoms or nausea, otherwise no significant dizziness or syncope. No significant hemodynamic changes. Otherwise, unremarkable tilt table test after 30 minutes of tilting.  OTHER SURGICAL HISTORY  01/20/2017    RIGHT SHOULDER ARTHROSCOPY, OPEN STAN, OPEN ACROMIOPLASTY, BICEP TENDESIS, RIGHT CARPAL TUNNEL RELEASE    SHOULDER SURGERY Right 01/2007    TONSILLECTOMY      TRANSTHORACIC ECHOCARDIOGRAM  3-05-11    LV size and systolic function normal. EF 55-65%.      UPPER GASTROINTESTINAL ENDOSCOPY  2012    UPPER GASTROINTESTINAL ENDOSCOPY  2016    Dr. Cuco Delatorre  2007       Family History   Problem Relation Age of Onset    Heart Disease Father 61     MI    Stroke Brother     Obesity Brother     Arthritis Mother     Seizures Mother     Obesity Sister     Other Brother      pulmonary embolism    Diabetes Daughter     Seizures Brother      Social History   Substance Use Topics    Smoking status: Never Smoker    Smokeless tobacco: Never Used    Alcohol use No      Current Outpatient Prescriptions   Medication Sig Dispense Refill    insulin aspart (NOVOLOG FLEXPEN) 100 UNIT/ML injection pen Inject 30 Units into the skin 3 times daily (before meals) Plus sliding Take 0.5 mg by mouth 2 times daily as needed (seizures)       lacosamide (VIMPAT) 100 MG TABS tablet 200 mg 2 times daily       zonisamide (ZONEGRAN) 100 MG capsule Take 400 mg by mouth nightly       DULoxetine (CYMBALTA) 60 MG capsule Take 1 capsule by mouth daily. 30 capsule 0    rOPINIRole (REQUIP) 1 MG tablet TAKE 1 TABLET BY MOUTH THREE TIMES A  tablet 0    gabapentin (NEURONTIN) 600 MG tablet Take 1 tablet by mouth 4 times daily 360 tablet 0     No current facility-administered medications for this visit.       Allergies   Allergen Reactions    Ciprofloxacin-Ciproflox Hcl Er Other (See Comments)     Elevated creatinine    Heparin      Monitor for thrombocytopenia    Metformin Nausea Only     Abdominal pain, diarrhea    Niacin And Related Other (See Comments)     Burning sensation, severe flushing    Tramadol Hcl      Contraindication to seizure medications    Ultram [Tramadol]      Contraindication to seizure medications    Warfarin      Very difficult to regulate in past     Health Maintenance   Topic Date Due    HIV screen  06/11/1985    DTaP/Tdap/Td vaccine (1 - Tdap) 06/11/1989    Pneumococcal med risk (1 of 1 - PPSV23) 06/11/1989    Colon cancer screen colonoscopy  02/28/2017    Diabetic retinal exam  06/14/2017    Flu vaccine (1) 09/01/2017    Diabetic foot exam  11/30/2017    Diabetic hemoglobin A1C test  10/06/2018    Diabetic microalbuminuria test  10/06/2018    Lipid screen  10/06/2018       Labs  Lab Results   Component Value Date    LABA1C 7.0 (H) 10/06/2017     No results found for: EAG  Lab Results   Component Value Date    TSH 2.000 10/06/2017     Lab Results   Component Value Date    CHOL 166 10/06/2017    CHOL 111 06/26/2017    CHOL 149 05/12/2017     Lab Results   Component Value Date    TRIG 348 (H) 10/06/2017    TRIG 246 (H) 06/26/2017    TRIG 1,140 (H) 05/12/2017     Lab Results   Component Value Date    HDL 32 10/06/2017    HDL 26 06/26/2017    HDL 22 05/12/2017     Lab Results   Component Value Date    LDLCALC 64 10/06/2017    LDLCALC 36 06/26/2017    [address] SEE BELOW 05/12/2017     No results found for: LABVLDL, VLDL  No results found for: CHOLHDLRATIO      Review of Systems  Constitutional: negative for chills, fatigue and fevers  Eyes: negative for irritation, redness and visual disturbance  Respiratory: negative for cough, shortness of breath and wheezing  Cardiovascular: negative for chest pain, irregular heart beat and palpitations    The remainder of systems were reviewed and negative. Objective:   /79 (Site: Right Arm)   Pulse 77   Resp 20   Ht 6' 3\" (1.905 m)   Wt 271 lb (122.9 kg)   BMI 33.87 kg/m²     General:  alert, appears stated age, cooperative and no distress   Oropharynx: normal findings: lips normal without lesions, buccal mucosa normal, gums healthy and tongue midline and normal    Eyes:  negative findings: lids and lashes normal, conjunctivae and sclerae normal, corneas clear and pupils equal, round, reactive to light and accomodation       Neck: no adenopathy, no carotid bruit, no JVD, supple, symmetrical, trachea midline and thyroid not enlarged, symmetric, no tenderness/mass/nodules   Thyroid:  no palpable nodule   Lung: clear to auscultation bilaterally   Heart:  regular rate and rhythm, S1, S2 normal, no murmur, click, rub or gallop and normal apical impulse   Abdomen: soft, non-tender; bowel sounds normal; no masses,  no organomegaly   Extremities: extremities normal, atraumatic, no cyanosis or edema and Homans sign is negative, no sign of DVT   Pulses: 2+ and symmetric   Skin: warm and dry, no hyperpigmentation, vitiligo, or suspicious lesions   Neuro: normal without focal findings, mental status, speech normal, alert and oriented x3, MAGDALENA, cranial nerves 2-12 intact, muscle tone and strength normal and symmetric. Lymph nodes: No cervical, supraclavicular or submental adenopathy.   Psych: Affect is normal.  [employer]

## 2017-11-03 ENCOUNTER — HOSPITAL ENCOUNTER (EMERGENCY)
Age: 47
Discharge: HOME | End: 2017-11-03
Payer: MEDICARE

## 2017-11-03 VITALS
SYSTOLIC BLOOD PRESSURE: 129 MMHG | OXYGEN SATURATION: 96 % | TEMPERATURE: 98.5 F | DIASTOLIC BLOOD PRESSURE: 94 MMHG | RESPIRATION RATE: 18 BRPM | HEART RATE: 88 BPM

## 2017-11-03 DIAGNOSIS — R20.2 LEFT HAND PARESTHESIA: ICD-10-CM

## 2017-11-03 DIAGNOSIS — M25.512 ACUTE PAIN OF LEFT SHOULDER: Primary | ICD-10-CM
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
## 2017-11-03 PROCEDURE — 99283 EMERGENCY DEPT VISIT LOW MDM: CPT

## 2017-11-03 RX ORDER — IBUPROFEN 600 MG/1
600 TABLET ORAL EVERY 6 HOURS PRN
Qty: 30 TABLET | Refills: 0 | Status: ON HOLD | OUTPATIENT
Start: 2017-11-03 | End: 2018-04-15 | Stop reason: HOSPADM

## 2017-11-03 ASSESSMENT — ENCOUNTER SYMPTOMS
CONSTIPATION: 0
COLOR CHANGE: 0
NAUSEA: 0
SORE THROAT: 0
EYE DISCHARGE: 0
WHEEZING: 0
ABDOMINAL PAIN: 0
VOMITING: 0
SHORTNESS OF BREATH: 0
RHINORRHEA: 0
EYE REDNESS: 0
COUGH: 0
BACK PAIN: 0
DIARRHEA: 0

## 2017-11-03 ASSESSMENT — PAIN DESCRIPTION - LOCATION: LOCATION: SHOULDER

## 2017-11-03 ASSESSMENT — PAIN DESCRIPTION - PAIN TYPE: TYPE: ACUTE PAIN

## 2017-11-03 ASSESSMENT — PAIN SCALES - GENERAL: PAINLEVEL_OUTOF10: 6

## 2017-11-03 NOTE — ED PROVIDER NOTES
[facility] EMERGENCY DEPT      CHIEF COMPLAINT       Chief Complaint   Patient presents with    Arm Pain     recent fall    Shoulder Injury       Nurses Notes reviewed and I agree except as noted in the HPI. HISTORY OF PRESENT ILLNESS    Rl Hinkle is a 52 y.o. male who presents to the emergency department for evaluation of left shoulder and arm pain. The patient states that his left upper extremity pain began one month ago after falling approximately 25 feet from a ladder and landing on his left shoulder. The patient states that he was evaluated previously at another healthcare facility and had x-rays performed at that time which were negative for any acute fracture or dislocation. However, the patient states that his left shoulder and arm pain has been ongoing since his injury. The patient is up and taking anything for his pain. The patient states that his pain is currently 6/10. He also reports numbness and tingling in his left hand since his injury but denies any weakness of the left upper extremity. He denies hitting his head, loss of consciousness, neck pain, back pain, chest pain, shortness of breath, or abdominal pain. He denies swelling, bruising, redness, or warmth of the left shoulder. He denies any left elbow or hand pain. He denies any additional injuries or areas of pain. The patient is right-handed. The patient has no additional complaints at this time. REVIEW OF SYSTEMS     Review of Systems   Constitutional: Negative for chills, fatigue and fever. HENT: Negative for congestion, ear pain, rhinorrhea and sore throat. Eyes: Negative for discharge and redness. Respiratory: Negative for cough, shortness of breath and wheezing. Cardiovascular: Negative for chest pain and palpitations. Gastrointestinal: Negative for abdominal pain, constipation, diarrhea, nausea and vomiting. Genitourinary: Negative for decreased urine volume, difficulty urinating and dysuria. Musculoskeletal: Positive for arthralgias (left shoulder). Negative for back pain, joint swelling, myalgias, neck pain and neck stiffness. Skin: Negative for color change, pallor and rash. Neurological: Negative for dizziness, syncope, weakness, light-headedness, numbness and headaches. Hematological: Negative for adenopathy. Psychiatric/Behavioral: Negative for agitation, confusion, dysphoric mood and suicidal ideas. The patient is not nervous/anxious. PAST MEDICAL HISTORY    has a past medical history of Acid reflux; Anemia; Bipolar disorder ([address] Blood clot in vein; Chest pain; Chronic back pain; Chronic bronchitis ([address] Colon polyp; Depression; DVT (deep venous thrombosis) ([address] Esophageal abnormality; Fatty liver disease, nonalcoholic; Furuncle; History of Doppler ultrasound; Hx of blood clots; Hyperlipidemia; Hypertension; Intracranial arachnoid cyst; Irritable bowel syndrome; Liver disease; MDRO (multiple drug resistant organisms) resistance; MRSA (methicillin resistant staph aureus) culture positive; Nephrolithiasis; Neuropathy ([address] Pancreatic insufficiency; Positive SANDY (antinuclear antibody); Pulmonary embolism ([address] Restless legs syndrome; Seizures ([address] Sinus tachycardia; Skull fracture ([address] Sleep apnea; and Type II or unspecified type diabetes mellitus without mention of complication, not stated as uncontrolled. SURGICAL HISTORY      has a past surgical history that includes knee surgery (Left, 2007); Vena Cava Filter Placement (2007); hernia repair (Right, 1996); Cholecystectomy (2004); Upper gastrointestinal endoscopy (2012); transthoracic echocardiogram (3-05-11); other surgical history (5-31-11); Cardiac catheterization; Cardiac catheterization (3-2012); craniotomy (11/2012); Tonsillectomy; Endoscopy, colon, diagnostic; EKG 12 Lead (10/18/2015); Knee arthroscopy (Right, 2016); Cardiac catheterization (04/28/2016); Upper gastrointestinal endoscopy (2016);  Inguinal hernia repair (Right, 09/15/2016); Colonoscopy (2012); Colonoscopy (08/2016); other surgical history (01/20/2017); shoulder surgery (Right, 01/2007); and Carpal tunnel release (01/2017). CURRENT MEDICATIONS       Previous Medications    ATORVASTATIN (LIPITOR) 10 MG TABLET    TAKE 1 TABLET BY MOUTH ONE TIME A DAY     BLOOD GLUCOSE MONITORING SUPPL (ONE TOUCH BASIC SYSTEM) W/DEVICE KIT    Use to test blood glucose level 4 times per day. Diagnosis: E11.40    CANAGLIFLOZIN (INVOKANA) 300 MG TABS TABLET    TAKE 1 TABLET BY MOUTH IN THE MORNING BEFORE breakfast    CHOLECALCIFEROL (VITAMIN D) 2000 UNITS CAPS CAPSULE    Take 2,000 Units by mouth daily    CLINDAMYCIN (CLEOCIN T) 1 % EXTERNAL SOLUTION    Apply topically 2 times daily. CREON 77737 UNITS CPEP    2 capsules 4 times daily (before meals and nightly)     DIVALPROEX (DEPAKOTE) 250 MG DR TABLET    Take 250 mg by mouth 2 times daily    DULOXETINE (CYMBALTA) 60 MG CAPSULE    Take 1 capsule by mouth daily. FENOFIBRATE (TRICOR) 145 MG TABLET    Take 1 tablet by mouth daily    FUROSEMIDE (LASIX) 20 MG TABLET    Take 1 tablet by mouth daily    GABAPENTIN (NEURONTIN) 600 MG TABLET    Take 1 tablet by mouth 4 times daily    GLUCOSE BLOOD VI TEST STRIPS (ONE TOUCH TEST STRIPS) STRIP    Use to test blood glucose level 4 times per day. Diagnosis: E11.40    INSULIN ASPART (NOVOLOG FLEXPEN) 100 UNIT/ML INJECTION PEN    Inject 30 Units into the skin 3 times daily (before meals) Plus sliding scale 4:50 > 150 mg/dl    INSULIN DETEMIR (LEVEMIR FLEXTOUCH) 100 UNIT/ML INJECTION PEN    75 units every AM and 57 units every PM    LACOSAMIDE (VIMPAT) 100 MG TABS TABLET    200 mg 2 times daily     LACOSAMIDE (VIMPAT) 50 MG TABS TABLET    Take 50 mg by mouth 2 times daily Takes with 100 mg size for 250 mg bid.     LEVETIRACETAM (KEPPRA) 500 MG TABLET    Take 2,000 mg by mouth 2 times daily     LORAZEPAM (ATIVAN) 0.5 MG TABLET    Take 0.5 mg by mouth 2 times daily as needed (seizures) METOPROLOL (LOPRESSOR) 100 MG TABLET    Take 1 tablet by mouth 2 times daily    OMEGA-3 ACID ETHYL ESTERS (LOVAZA) 1 G CAPSULE    Take 2 capsules by mouth 2 times daily    PANTOPRAZOLE (PROTONIX) 40 MG TABLET    Take 1 tablet by mouth 2 times daily    PROMETHAZINE (PHENERGAN) 12.5 MG TABLET    Take 1-2 tablets by mouth every 8 hours as needed for Nausea    ROPINIROLE (REQUIP) 1 MG TABLET    TAKE 1 TABLET BY MOUTH THREE TIMES A DAY    SILDENAFIL (VIAGRA) 100 MG TABLET    Take 1 tablet by mouth as needed for Erectile Dysfunction    ZONISAMIDE (ZONEGRAN) 100 MG CAPSULE    Take 400 mg by mouth nightly        ALLERGIES     is allergic to ciprofloxacin-ciproflox hcl er; heparin; metformin; niacin and related; tramadol hcl; ultram [tramadol]; and warfarin. FAMILY HISTORY     indicated that his mother is alive. He indicated that his father is . He indicated that his sister is alive. He indicated that three of his five brothers are alive. He indicated that the status of his daughter is unknown.    family history includes Arthritis in his mother; Diabetes in his daughter; Heart Disease (age of onset: 61) in his father; Obesity in his brother and sister; Other in his brother; Seizures in his brother and mother; Stroke in his brother. SOCIAL HISTORY      reports that he has never smoked. He has never used smokeless tobacco. He reports that he does not drink alcohol or use drugs. PHYSICAL EXAM     INITIAL VITALS:  oral temperature is 98.5 °F (36.9 °C). His blood pressure is 129/94 (abnormal) and his pulse is 88. His respiration is 18 and oxygen saturation is 96%. Physical Exam   Constitutional: He is oriented to person, place, and time. He appears well-developed and well-nourished. No distress. HENT:   Head: Normocephalic and atraumatic.    Right Ear: External ear normal.   Left Ear: External ear normal.   Nose: Nose normal.   Mouth/Throat: Oropharynx is clear and moist.   Eyes: Conjunctivae and EOM are normal. Pupils are equal, round, and reactive to light. Right eye exhibits no discharge. Left eye exhibits no discharge. No scleral icterus. Neck: Normal range of motion. Neck supple. Cardiovascular: Normal rate, regular rhythm, normal heart sounds and intact distal pulses. Exam reveals no gallop and no friction rub. No murmur heard. Pulses:       Radial pulses are 2+ on the right side, and 2+ on the left side. Capillary refill is less than 3 seconds. Pulmonary/Chest: Effort normal and breath sounds normal. No respiratory distress. He has no wheezes. He has no rales. He exhibits no tenderness. Abdominal: Soft. He exhibits no distension. Musculoskeletal: He exhibits tenderness. He exhibits no edema or deformity. Left shoulder: He exhibits decreased range of motion, tenderness and decreased strength. He exhibits no swelling, no crepitus and no deformity. Left elbow: Normal. He exhibits normal range of motion, no swelling and no deformity. No tenderness found. Cervical back: Normal. He exhibits normal range of motion, no tenderness, no swelling and no deformity. Thoracic back: Normal. He exhibits normal range of motion, no tenderness, no swelling and no deformity. Lumbar back: Normal. He exhibits normal range of motion, no tenderness, no swelling and no deformity. Left upper arm: Normal. He exhibits no tenderness, no swelling, no edema and no deformity. The patient has no midline or paraspinal tenderness of the cervical, thoracic, or lumbar spine. He does have mild diffuse tenderness of the left shoulder. He has decreased range of motion and strength of the left shoulder secondary to pain. He does have a slightly positive empty can test of the left upper extremity.   His left elbow, left wrist, and fingers of the left hand are nontender with full range of motion and strength, although he does complain of left shoulder pain with flexion and extension of the left elbow. No edema or bruising are noted of the left shoulder. There is intact sensation of soft touch in the LUE. The LUE appears to be neurovascularly intact. Lymphadenopathy:     He has no cervical adenopathy. Neurological: He is alert and oriented to person, place, and time. He exhibits normal muscle tone. Coordination normal. GCS eye subscore is 4. GCS verbal subscore is 5. GCS motor subscore is 6. Skin: Skin is warm and dry. No rash noted. He is not diaphoretic. No erythema. No pallor. Psychiatric: He has a normal mood and affect. His behavior is normal. Thought content normal.   Nursing note and vitals reviewed. DIFFERENTIAL DIAGNOSIS:   Includes but not limited to: Contusion, strain, sprain, rotator cuff tear, dislocation, fracture, cervical radiculopathy    DIAGNOSTIC RESULTS     EKG: All EKG's are interpreted by the Emergency Department Physician who either signs or Co-signs this chart in the absence of a cardiologist.  None    RADIOLOGY: non-plain film images(s) such as CT, Ultrasound and MRI are read by the radiologist.  Plain radiographic images are visualized and preliminarily interpreted by the emergency physician unless otherwise stated below. No orders to display       LABS:   Labs Reviewed - No data to display    EMERGENCY DEPARTMENT COURSE:   Vitals:    Vitals:    11/03/17 1435   BP: (!) 129/94   Pulse: 88   Resp: 18   Temp: 98.5 °F (36.9 °C)   TempSrc: Oral   SpO2: 96%     The patient was seen and evaluated within the ED today with left shoulder pain for 1 month after falling from a ladder. Within the department, I observed the patient's vital signs to be within acceptable range. Patient was hypertensive during his stay, but this was believed to be secondary to pain and his history of HTN. He will need to follow up with his PCP for further BP monitoring. On exam, I appreciated no midline or paraspinal tenderness of the cervical, thoracic, or lumbar spine.   He does have mild diffuse tenderness of the left shoulder. He has decreased range of motion and strength of the left shoulder secondary to pain. He does have a slightly positive empty can test of the left upper extremity. His left elbow, left wrist, and fingers of the left hand are nontender with full range of motion and strength, although he does complain of left shoulder pain with flexion and extension of the left elbow. No edema or bruising are noted of the left shoulder. There is intact sensation of soft touch in the LUE. The LUE appears to be neurovascularly intact. The patient declined obtaining x-rays of his neck, thoracic spine, and left shoulder because he stated that \"these are wasted my time\". The patient is essentially asking for an MRI, as he believes that he has a rotator cuff tear. I informed the patient that we do not order MRIs in the Emergency Department unless there is an acute neurological or surgical emergency. The patient continued to decline x-ray evaluation. I discussed risks of refusal with the patient such as undiagnosed fracture or dislocation, and the patient vocalized understanding of the risks of refusal.  He agreed to follow-up with [facility] for further evaluation and management, including a possible MRI of the left upper extremity to evaluate for rotator cuff injury. I observed the patient's condition to remain stable during the duration of the stay. I explained my proposed course of treatment to the patient, who was amenable to my treatment and discharge decisions. He was discharged home in stable condition with prescriptions for Ibuprofen, and the patient will return to the ED if the symptoms become more severe in nature or otherwise change. CRITICAL CARE:   None    CONSULTS:  None    PROCEDURES:  None    FINAL IMPRESSION      1. Acute pain of left shoulder    2. Left hand paresthesia    3. Fall from ladder, subsequent encounter          DISPOSITION/PLAN     1.  Acute pain of left shoulder 2. Left hand paresthesia    3. Fall from ladder, subsequent encounter      I have given the patient strict written and verbal instructions about care at home, follow-up, and signs and symptoms of worsening of condition, and the patient did verbalize understanding of these instructions. Patient was discharged in stable condition. Will return if symptoms change or worsen, or for any sign or symptom deemed emergent by the patient or family members. Follow up as an outpatient or sooner if symptoms warrant.      PATIENT REFERRED TO:  [address] [facility] [phone]  Call in 1 week  As needed, If symptoms worsen    Crystal Catherine MD  [facility], [address]  [place] [address]  [phone]    Call in 1 week  As needed, If symptoms worsen      DISCHARGE MEDICATIONS:  New Prescriptions    IBUPROFEN (ADVIL;MOTRIN) 600 MG TABLET    Take 1 tablet by mouth every 6 hours as needed for Pain       (Please note that portions of this note were completed with a voice recognition program.  Efforts were made to edit the dictations but occasionally words are mis-transcribed.)    MAGED Price PA-C  11/03/17 1378

## 2017-11-03 NOTE — ED TRIAGE NOTES
Pt states he had a recent fall from a ladder with left arm pain. He states he was evaluated at another health care facility for that, but has struggled with ongoing pain. He rates his pain currently 6/10.

## 2017-11-03 NOTE — ED NOTES
Discharge instructions discussed with the patient and all questions fully answered. He will call his family doctor or return to the ER if any problems arise. Pt ambulatory to lobby in stable condition.      Gera Bean RN  11/03/17 8975

## 2017-11-27 ENCOUNTER — TELEPHONE (OUTPATIENT)
Dept: ENDOCRINOLOGY | Age: 47
End: 2017-11-27

## 2017-11-27 DIAGNOSIS — E11.40 TYPE 2 DIABETES MELLITUS WITH DIABETIC NEUROPATHY, UNSPECIFIED LONG TERM INSULIN USE STATUS: Primary | ICD-10-CM

## 2017-11-29 DIAGNOSIS — E11.40 TYPE 2 DIABETES MELLITUS WITH DIABETIC NEUROPATHY, WITH LONG-TERM CURRENT USE OF INSULIN (HCC): Primary | ICD-10-CM

## 2017-11-29 DIAGNOSIS — Z79.4 TYPE 2 DIABETES MELLITUS WITH DIABETIC NEUROPATHY, WITH LONG-TERM CURRENT USE OF INSULIN (HCC): Primary | ICD-10-CM

## 2017-12-01 DIAGNOSIS — E11.40 TYPE 2 DIABETES MELLITUS WITH DIABETIC NEUROPATHY, WITH LONG-TERM CURRENT USE OF INSULIN (HCC): ICD-10-CM

## 2017-12-01 DIAGNOSIS — Z79.4 TYPE 2 DIABETES MELLITUS WITH DIABETIC NEUROPATHY, WITH LONG-TERM CURRENT USE OF INSULIN (HCC): ICD-10-CM

## 2017-12-07 ENCOUNTER — HOSPITAL ENCOUNTER (OUTPATIENT)
Age: 47
Discharge: HOME | End: 2017-12-07
Payer: MEDICARE

## 2017-12-07 ENCOUNTER — OFFICE VISIT (OUTPATIENT)
Dept: INTERNAL MEDICINE CLINIC | Age: 47
End: 2017-12-07
Payer: MEDICARE

## 2017-12-07 VITALS
SYSTOLIC BLOOD PRESSURE: 118 MMHG | HEIGHT: 75 IN | OXYGEN SATURATION: 98 % | WEIGHT: 265 LBS | BODY MASS INDEX: 32.95 KG/M2 | HEART RATE: 94 BPM | DIASTOLIC BLOOD PRESSURE: 76 MMHG

## 2017-12-07 DIAGNOSIS — R07.89 OTHER CHEST PAIN: Primary | ICD-10-CM

## 2017-12-07 DIAGNOSIS — G40.909 NONINTRACTABLE EPILEPSY WITHOUT STATUS EPILEPTICUS, UNSPECIFIED EPILEPSY TYPE (HCC): ICD-10-CM

## 2017-12-07 DIAGNOSIS — G62.9 NEUROPATHY: ICD-10-CM

## 2017-12-07 DIAGNOSIS — E11.59 HYPERTENSION ASSOCIATED WITH DIABETES (HCC): ICD-10-CM

## 2017-12-07 DIAGNOSIS — E78.2 MIXED HYPERLIPIDEMIA: ICD-10-CM

## 2017-12-07 DIAGNOSIS — G25.81 RESTLESS LEGS SYNDROME (RLS): ICD-10-CM

## 2017-12-07 DIAGNOSIS — E55.9 HYPOVITAMINOSIS D: ICD-10-CM

## 2017-12-07 DIAGNOSIS — K21.9 GASTROESOPHAGEAL REFLUX DISEASE WITHOUT ESOPHAGITIS: ICD-10-CM

## 2017-12-07 DIAGNOSIS — I15.2 HYPERTENSION ASSOCIATED WITH DIABETES (HCC): ICD-10-CM

## 2017-12-07 DIAGNOSIS — E11.40 TYPE 2 DIABETES MELLITUS WITH DIABETIC NEUROPATHY, UNSPECIFIED LONG TERM INSULIN USE STATUS: ICD-10-CM

## 2017-12-07 DIAGNOSIS — R11.2 NON-INTRACTABLE VOMITING WITH NAUSEA, UNSPECIFIED VOMITING TYPE: ICD-10-CM

## 2017-12-07 LAB
FOLATE: 7.5 NG/ML (ref 4.8–24.2)
VITAMIN B-12: 214 PG/ML (ref 211–911)
VITAMIN D 25-HYDROXY: 19 NG/ML (ref 30–100)

## 2017-12-07 PROCEDURE — G8484 FLU IMMUNIZE NO ADMIN: HCPCS | Performed by: INTERNAL MEDICINE

## 2017-12-07 PROCEDURE — 93000 ELECTROCARDIOGRAM COMPLETE: CPT | Performed by: INTERNAL MEDICINE

## 2017-12-07 PROCEDURE — 82306 VITAMIN D 25 HYDROXY: CPT

## 2017-12-07 PROCEDURE — 36415 COLL VENOUS BLD VENIPUNCTURE: CPT

## 2017-12-07 PROCEDURE — 99214 OFFICE O/P EST MOD 30 MIN: CPT | Performed by: INTERNAL MEDICINE

## 2017-12-07 PROCEDURE — 82746 ASSAY OF FOLIC ACID SERUM: CPT

## 2017-12-07 PROCEDURE — 3045F PR MOST RECENT HEMOGLOBIN A1C LEVEL 7.0-9.0%: CPT | Performed by: INTERNAL MEDICINE

## 2017-12-07 PROCEDURE — G8427 DOCREV CUR MEDS BY ELIG CLIN: HCPCS | Performed by: INTERNAL MEDICINE

## 2017-12-07 PROCEDURE — G8417 CALC BMI ABV UP PARAM F/U: HCPCS | Performed by: INTERNAL MEDICINE

## 2017-12-07 PROCEDURE — 1036F TOBACCO NON-USER: CPT | Performed by: INTERNAL MEDICINE

## 2017-12-07 PROCEDURE — 82607 VITAMIN B-12: CPT

## 2017-12-07 RX ORDER — PROMETHAZINE HYDROCHLORIDE 12.5 MG/1
12.5-25 TABLET ORAL EVERY 8 HOURS PRN
Qty: 60 TABLET | Refills: 2 | Status: SHIPPED | OUTPATIENT
Start: 2017-12-07 | End: 2018-04-09 | Stop reason: SDUPTHER

## 2017-12-07 RX ORDER — PANTOPRAZOLE SODIUM 40 MG/1
40 TABLET, DELAYED RELEASE ORAL 2 TIMES DAILY
Qty: 180 TABLET | Refills: 1 | Status: SHIPPED | OUTPATIENT
Start: 2017-12-07 | End: 2018-04-09 | Stop reason: SDUPTHER

## 2017-12-07 RX ORDER — ROPINIROLE 1 MG/1
TABLET, FILM COATED ORAL
Qty: 270 TABLET | Refills: 1 | Status: SHIPPED | OUTPATIENT
Start: 2017-12-07 | End: 2018-03-08 | Stop reason: SDUPTHER

## 2017-12-07 RX ORDER — FENOFIBRATE 145 MG/1
145 TABLET, COATED ORAL DAILY
Qty: 90 TABLET | Refills: 1 | Status: SHIPPED | OUTPATIENT
Start: 2017-12-07 | End: 2018-04-09 | Stop reason: SDUPTHER

## 2017-12-07 RX ORDER — GABAPENTIN 600 MG/1
600 TABLET ORAL 4 TIMES DAILY
Qty: 360 TABLET | Refills: 1 | Status: SHIPPED | OUTPATIENT
Start: 2017-12-07 | End: 2018-10-16 | Stop reason: SDUPTHER

## 2017-12-07 NOTE — PROGRESS NOTES
1970    Chief Complaint   Patient presents with    Hypertension     missed 3 month appt. / Dr. Alisha Prather may be doing bilateral nerve release in feet     Hyperlipidemia       Pt is a 52 y.o. male who presents for 4 month follow up visit. Chest pain in office today. EKG with non-specific changes - recommended he go to ER with dull pain. He refused. Offered nitro - he refused - never works just gives him headache. Recommended he get back on lower dose metoprolol to decrease HR and stress on heart and contact Dr. Mary Anne Cole if persists. He agreed to that. His last cath 4/2016 negative except bridging of LAD. He also has chest pain but different type from left shoulder pain. Injured left shoulder falling off shelving unit at work 9/2017 in [place] at urgent care - unknown where - they did x-rays. He states it feels like tear of RTC like other shoulder and still painful - offered referral to [facility]. He refused and will let me know if needs help. Vitamin D low - Dr. Leo Fagan started 2000 IU daily and improved from 10->19 in 2 months. He has severe sensitivity of feet - he is to start Zostrix cream - mail ordered and he is on something similar OTC per patient and no relief. He is on Neurontin. He is considering surgery with Dr. Alisha Prather. Hypertension - BP controlled. He stopped metoprolol month ago - HR a little high normal but BP normal.  He was on 100 mg BID. He stopped cold turkey and denied symptoms. He has lost weight. Hyperlipidemia - LDL 36, HDL 26, Trig 246, normal LFT 6/2017. Trig 348, HDL 32, LDL 64 and normal hepatic except slightly elevated TB at 1.4 10/2017. RLS - Requip keeps it stable. Neuropathy - still severe - on Neurontin. GERD - stable, continue Protonix. Not taking Protonix except rarely. Seizures better controlled - only one sz since July since med change. They increased Vimpat and he uses Ativan at first sign of seizure - head numbness.   Reminded him to not suspected sleep apnea but he refuses to be tested as severely claustraphobic and won't wear machine. He can't even leave in nasal canula at the hospital.     Past Medical History:   Diagnosis Date    Acid reflux     Anemia     previously seen by Dr. Gonzalo Tena (due to enlarged spleen)    Bipolar disorder ([address]     Blood clot in vein 4/7/16    Kendall Boateng     Chest pain     previously seeing [place] cardiologist, now seeing Dr. Latosha Fowler (LAD bridging on cath 4/2016)    Chronic back pain     Chronic bronchitis ([address]     Dr. Linsey Jasmine 10/2012 - no longer following with him    Colon polyp 08/30/2016    Depression     Dr. Johnna Sorenson DVT (deep venous thrombosis) ([facility] [id number]    not on Coumadin due to unable to regulate - Dr. Griselda Salomon - no etiology found per patient    Esophageal abnormality     nodule     Fatty liver disease, nonalcoholic     U/S [id number]/[facility]    Furuncle     legs    History of Doppler ultrasound 5-29-11    No hemodynamically significant carotid stenosis is identified. A thyroid nodule on each side. Dedicated ultrasound of thyroid gland is suggested to further evaluate if clinically indicated.      Hx of blood clots     Hyperlipidemia     severely elevated triglycerides    Hypertension     diastolic    Intracranial arachnoid cyst 11/2012    Dr. Jermaine Garcia drained, complicated with seizures, DKA    Irritable bowel syndrome     Liver disease     Jodyma Joceline - elevated LFT - positive smooth muscle antibody, steatosis per liver bx 3/2014 with Dr. Noris Silva (multiple drug resistant organisms) resistance 2012    MRSA (methicillin resistant staph aureus) culture positive     h/o in foot and before brain surgery    Nephrolithiasis     noted on CT abdomen 9/2016    Neuropathy ([address]     Pancreatic insufficiency     Positive SANDY (antinuclear antibody)     Dr. Florencio Nuno - first visit in 6/2013    Pulmonary embolism ([address] 2007    s/p GFF, related to knee surgery    Restless legs syndrome     Seizures tablet 1    pantoprazole (PROTONIX) 40 MG tablet Take 1 tablet by mouth 2 times daily 180 tablet 1    fenofibrate (TRICOR) 145 MG tablet Take 1 tablet by mouth daily 90 tablet 1    promethazine (PHENERGAN) 12.5 MG tablet Take 1-2 tablets by mouth every 8 hours as needed for Nausea 60 tablet 2    metoprolol tartrate (LOPRESSOR) 25 MG tablet Take 1 tablet by mouth 2 times daily 60 tablet 5    glucose blood VI test strips (ONE TOUCH TEST STRIPS) strip Use to test blood glucose level 4 times per day. Diagnosis: E11.40 150 each 5    ibuprofen (ADVIL;MOTRIN) 600 MG tablet Take 1 tablet by mouth every 6 hours as needed for Pain 30 tablet 0    Cholecalciferol (VITAMIN D) 2000 units CAPS capsule Take 2,000 Units by mouth daily 30 capsule 0    insulin aspart (NOVOLOG FLEXPEN) 100 UNIT/ML injection pen Inject 30 Units into the skin 3 times daily (before meals) Plus sliding scale 4:50 > 150 mg/dl 10 Pen 5    insulin detemir (LEVEMIR FLEXTOUCH) 100 UNIT/ML injection pen 75 units every AM and 57 units every PM 7 Pen 5    canagliflozin (INVOKANA) 300 MG TABS tablet TAKE 1 TABLET BY MOUTH IN THE MORNING BEFORE breakfast 30 tablet 5    sildenafil (VIAGRA) 100 MG tablet Take 1 tablet by mouth as needed for Erectile Dysfunction 45 tablet 3    lacosamide (VIMPAT) 50 MG TABS tablet Take 50 mg by mouth 2 times daily Takes with 100 mg size for 250 mg bid.  atorvastatin (LIPITOR) 10 MG tablet TAKE 1 TABLET BY MOUTH ONE TIME A DAY  30 tablet 5    omega-3 acid ethyl esters (LOVAZA) 1 G capsule Take 2 capsules by mouth 2 times daily 120 capsule 3    Blood Glucose Monitoring Suppl (ONE TOUCH BASIC SYSTEM) W/DEVICE KIT Use to test blood glucose level 4 times per day. Diagnosis: E11.40 1 kit 0    CREON 09687 UNITS CPEP 2 capsules 4 times daily (before meals and nightly)       clindamycin (CLEOCIN T) 1 % external solution Apply topically 2 times daily.  (Patient taking differently: as needed Apply topically 2 times daily. ) 30

## 2017-12-08 ENCOUNTER — APPOINTMENT (OUTPATIENT)
Dept: GENERAL RADIOLOGY | Age: 47
End: 2017-12-08
Payer: MEDICARE

## 2017-12-08 ENCOUNTER — HOSPITAL ENCOUNTER (EMERGENCY)
Age: 47
Discharge: HOME | End: 2017-12-08
Attending: EMERGENCY MEDICINE
Payer: MEDICARE

## 2017-12-08 ENCOUNTER — CARE COORDINATOR VISIT (OUTPATIENT)
Dept: CASE MANAGEMENT | Age: 47
End: 2017-12-08

## 2017-12-08 VITALS
SYSTOLIC BLOOD PRESSURE: 151 MMHG | OXYGEN SATURATION: 96 % | RESPIRATION RATE: 16 BRPM | TEMPERATURE: 98.3 F | DIASTOLIC BLOOD PRESSURE: 92 MMHG | HEART RATE: 92 BPM

## 2017-12-08 DIAGNOSIS — R07.89 OTHER CHEST PAIN: Primary | ICD-10-CM

## 2017-12-08 LAB
EKG ATRIAL RATE: 91 BPM
EKG P AXIS: 49 DEGREES
EKG P-R INTERVAL: 172 MS
EKG Q-T INTERVAL: 344 MS
EKG QRS DURATION: 72 MS
EKG QTC CALCULATION (BAZETT): 423 MS
EKG R AXIS: 53 DEGREES
EKG T AXIS: 41 DEGREES
EKG VENTRICULAR RATE: 91 BPM

## 2017-12-08 PROCEDURE — 93005 ELECTROCARDIOGRAM TRACING: CPT

## 2017-12-08 PROCEDURE — 99284 EMERGENCY DEPT VISIT MOD MDM: CPT

## 2017-12-08 ASSESSMENT — ENCOUNTER SYMPTOMS
COUGH: 0
EYE DISCHARGE: 0
WHEEZING: 0
ABDOMINAL DISTENTION: 0
RHINORRHEA: 0
SHORTNESS OF BREATH: 0
DIARRHEA: 0
VOMITING: 0
NAUSEA: 0
EYE ITCHING: 0
ABDOMINAL PAIN: 0

## 2017-12-08 NOTE — ED PROVIDER NOTES
[facility]  eMERGENCY dEPARTMENT eNCOUnter          [address] [facility]       Chief Complaint   Patient presents with    Chest Pain     X2 days       Nurses Notes reviewed and I agree except as noted in the HPI. HISTORY OF PRESENT ILLNESS    Freddie Ward is a 52 y.o. male who presents for evaluation of chest pain for the past 2 days. Pt describes the chest pain as an intermittent heaviness that has been persistent since 9:00 AM today. He has a family h/o heart disease and had a heart catheterization done on 4/28/16 by Dr. Bianka Pressley which revealed very minimal CAD. Per cardiologist, the pt's chest pain is not cardiac in origin. No h/o anxiety or heart murmurs. No shortness of breath. No further complaints at the time of the initial encounter. Pt arrived by private car. Hx obtained from the pt. Last heart cath on 4/28/2016    CONCLUSION:    1. No major plaque formation or major coronary artery disease with some          degree of bridging in the LAD. 2.    Normal LV function. 3.    Likely noncardiac chest pain. RECOMMENDATIONS:  At this point medical treatment, risk factor modification is recommended. Further follow up and treatment by family physician. REVIEW OF SYSTEMS     Review of Systems   Constitutional: Negative for activity change, appetite change, chills, fatigue and fever. HENT: Negative for congestion, ear discharge, hearing loss, nosebleeds and rhinorrhea. Eyes: Negative for discharge and itching. Respiratory: Negative for cough, shortness of breath and wheezing. Cardiovascular: Positive for chest pain. Gastrointestinal: Negative for abdominal distention, abdominal pain, diarrhea, nausea and vomiting. Endocrine: Negative for cold intolerance. Genitourinary: Negative for dysuria and flank pain. Musculoskeletal: Negative for arthralgias, myalgias, neck pain and neck stiffness. Skin: Negative for rash and wound.    Neurological: Negative for W/DEVICE KIT    Use to test blood glucose level 4 times per day. Diagnosis: E11.40    CANAGLIFLOZIN (INVOKANA) 300 MG TABS TABLET    TAKE 1 TABLET BY MOUTH IN THE MORNING BEFORE breakfast    CHOLECALCIFEROL (VITAMIN D) 2000 UNITS CAPS CAPSULE    Take 2,000 Units by mouth daily    CLINDAMYCIN (CLEOCIN T) 1 % EXTERNAL SOLUTION    Apply topically 2 times daily. CREON 95417 UNITS CPEP    2 capsules 4 times daily (before meals and nightly)     FENOFIBRATE (TRICOR) 145 MG TABLET    Take 1 tablet by mouth daily    FUROSEMIDE (LASIX) 20 MG TABLET    Take 1 tablet by mouth daily    GABAPENTIN (NEURONTIN) 600 MG TABLET    Take 1 tablet by mouth 4 times daily    GLUCOSE BLOOD VI TEST STRIPS (ONE TOUCH TEST STRIPS) STRIP    Use to test blood glucose level 4 times per day. Diagnosis: E11.40    IBUPROFEN (ADVIL;MOTRIN) 600 MG TABLET    Take 1 tablet by mouth every 6 hours as needed for Pain    INSULIN ASPART (NOVOLOG FLEXPEN) 100 UNIT/ML INJECTION PEN    Inject 30 Units into the skin 3 times daily (before meals) Plus sliding scale 4:50 > 150 mg/dl    INSULIN DETEMIR (LEVEMIR FLEXTOUCH) 100 UNIT/ML INJECTION PEN    75 units every AM and 57 units every PM    LACOSAMIDE (VIMPAT) 100 MG TABS TABLET    200 mg 2 times daily     LACOSAMIDE (VIMPAT) 50 MG TABS TABLET    Take 50 mg by mouth 2 times daily Takes with 100 mg size for 250 mg bid.     LEVETIRACETAM (KEPPRA) 500 MG TABLET    Take 2,000 mg by mouth 2 times daily     LORAZEPAM (ATIVAN) 0.5 MG TABLET    Take 0.5 mg by mouth 2 times daily as needed (seizures)     METOPROLOL TARTRATE (LOPRESSOR) 25 MG TABLET    Take 1 tablet by mouth 2 times daily    OMEGA-3 ACID ETHYL ESTERS (LOVAZA) 1 G CAPSULE    Take 2 capsules by mouth 2 times daily    PANTOPRAZOLE (PROTONIX) 40 MG TABLET    Take 1 tablet by mouth 2 times daily    PROMETHAZINE (PHENERGAN) 12.5 MG TABLET    Take 1-2 tablets by mouth every 8 hours as needed for Nausea    ROPINIROLE (REQUIP) 1 MG TABLET    TAKE 1 TABLET BY MOUTH

## 2017-12-08 NOTE — ED NOTES
Patient in ed room 4. Patient complain of chest pressure X2 days. Patient in gown on monitor, vital signs obtained and assessment completed.      Sreekanth Hernandez RN  12/08/17 [id number]

## 2017-12-10 ENCOUNTER — HOSPITAL ENCOUNTER (EMERGENCY)
Age: 47
Discharge: HOME | End: 2017-12-11
Attending: EMERGENCY MEDICINE
Payer: MEDICARE

## 2017-12-10 ENCOUNTER — APPOINTMENT (OUTPATIENT)
Dept: CT IMAGING | Age: 47
End: 2017-12-10
Payer: MEDICARE

## 2017-12-10 DIAGNOSIS — K52.9 GASTROENTERITIS: Primary | ICD-10-CM

## 2017-12-10 LAB
ALBUMIN SERPL-MCNC: 4.6 G/DL (ref 3.5–5.1)
ALP BLD-CCNC: 91 U/L (ref 38–126)
ALT SERPL-CCNC: 30 U/L (ref 11–66)
AMPHETAMINE+METHAMPHETAMINE URINE SCREEN: NEGATIVE
ANION GAP SERPL CALCULATED.3IONS-SCNC: 13 MEQ/L (ref 8–16)
AST SERPL-CCNC: 22 U/L (ref 5–40)
BACTERIA: ABNORMAL /HPF
BARBITURATE QUANTITATIVE URINE: NEGATIVE
BASOPHILS # BLD: 0.6 %
BASOPHILS ABSOLUTE: 0.1 THOU/MM3 (ref 0–0.1)
BENZODIAZEPINE QUANTITATIVE URINE: NEGATIVE
BILIRUB SERPL-MCNC: 1.4 MG/DL (ref 0.3–1.2)
BILIRUBIN DIRECT: 0.3 MG/DL (ref 0–0.3)
BILIRUBIN URINE: NEGATIVE
BLOOD, URINE: NEGATIVE
BUN BLDV-MCNC: 12 MG/DL (ref 7–22)
CALCIUM SERPL-MCNC: 9.3 MG/DL (ref 8.5–10.5)
CANNABINOID QUANTITATIVE URINE: NEGATIVE
CASTS 2: ABNORMAL /LPF
CASTS UA: ABNORMAL /LPF
CHARACTER, URINE: CLEAR
CHLORIDE BLD-SCNC: 100 MEQ/L (ref 98–111)
CO2: 26 MEQ/L (ref 23–33)
COCAINE METABOLITE QUANTITATIVE URINE: NEGATIVE
COLOR: YELLOW
CREAT SERPL-MCNC: 0.8 MG/DL (ref 0.4–1.2)
CRYSTALS, UA: ABNORMAL
EKG ATRIAL RATE: 86 BPM
EKG P AXIS: 30 DEGREES
EKG P-R INTERVAL: 180 MS
EKG Q-T INTERVAL: 362 MS
EKG QRS DURATION: 86 MS
EKG QTC CALCULATION (BAZETT): 433 MS
EKG R AXIS: 43 DEGREES
EKG T AXIS: 24 DEGREES
EKG VENTRICULAR RATE: 86 BPM
EOSINOPHIL # BLD: 2.1 %
EOSINOPHILS ABSOLUTE: 0.2 THOU/MM3 (ref 0–0.4)
EPITHELIAL CELLS, UA: ABNORMAL /HPF
ETHYL ALCOHOL, SERUM: < 0.01 %
GFR SERPL CREATININE-BSD FRML MDRD: > 90 ML/MIN/1.73M2
GLUCOSE BLD-MCNC: 273 MG/DL (ref 70–108)
GLUCOSE URINE: >= 1000 MG/DL
HCT VFR BLD CALC: 49.6 % (ref 42–52)
HEMOGLOBIN: 17.5 GM/DL (ref 14–18)
KETONES, URINE: NEGATIVE
LEUKOCYTE ESTERASE, URINE: NEGATIVE
LIPASE: 38.1 U/L (ref 5.6–51.3)
LYMPHOCYTES # BLD: 29.5 %
LYMPHOCYTES ABSOLUTE: 2.8 THOU/MM3 (ref 1–4.8)
MAGNESIUM: 1.8 MG/DL (ref 1.6–2.4)
MCH RBC QN AUTO: 31.1 PG (ref 27–31)
MCHC RBC AUTO-ENTMCNC: 35.2 GM/DL (ref 33–37)
MCV RBC AUTO: 88.3 FL (ref 80–94)
MISCELLANEOUS 2: ABNORMAL
MONOCYTES # BLD: 6.6 %
MONOCYTES ABSOLUTE: 0.6 THOU/MM3 (ref 0.4–1.3)
NITRITE, URINE: NEGATIVE
NUCLEATED RED BLOOD CELLS: 0 /100 WBC
OPIATES, URINE: NEGATIVE
OSMOLALITY CALCULATION: 287 MOSMOL/KG (ref 275–300)
OXYCODONE: NEGATIVE
PDW BLD-RTO: 13.2 % (ref 11.5–14.5)
PH UA: 5.5
PHENCYCLIDINE QUANTITATIVE URINE: NEGATIVE
PLATELET # BLD: 215 THOU/MM3 (ref 130–400)
PMV BLD AUTO: 8.6 MCM (ref 7.4–10.4)
POTASSIUM SERPL-SCNC: 4.3 MEQ/L (ref 3.5–5.2)
PRO-BNP: 19.8 PG/ML (ref 0–450)
PROTEIN UA: NEGATIVE
RBC # BLD: 5.62 MILL/MM3 (ref 4.7–6.1)
RBC URINE: ABNORMAL /HPF
RENAL EPITHELIAL, UA: ABNORMAL
SEG NEUTROPHILS: 61.2 %
SEGMENTED NEUTROPHILS ABSOLUTE COUNT: 5.8 THOU/MM3 (ref 1.8–7.7)
SODIUM BLD-SCNC: 139 MEQ/L (ref 135–145)
SPECIFIC GRAVITY, URINE: > 1.03 (ref 1–1.03)
TOTAL PROTEIN: 7.5 G/DL (ref 6.1–8)
TROPONIN T: < 0.01 NG/ML
TSH SERPL DL<=0.05 MIU/L-ACNC: 3.19 UIU/ML (ref 0.4–4.2)
UROBILINOGEN, URINE: 0.2 EU/DL
WBC # BLD: 9.4 THOU/MM3 (ref 4.8–10.8)
WBC UA: ABNORMAL /HPF
YEAST: ABNORMAL

## 2017-12-10 PROCEDURE — 93005 ELECTROCARDIOGRAM TRACING: CPT

## 2017-12-10 PROCEDURE — 36415 COLL VENOUS BLD VENIPUNCTURE: CPT

## 2017-12-10 PROCEDURE — 80307 DRUG TEST PRSMV CHEM ANLYZR: CPT

## 2017-12-10 PROCEDURE — 81001 URINALYSIS AUTO W/SCOPE: CPT

## 2017-12-10 PROCEDURE — 83690 ASSAY OF LIPASE: CPT

## 2017-12-10 PROCEDURE — 99284 EMERGENCY DEPT VISIT MOD MDM: CPT

## 2017-12-10 PROCEDURE — 84484 ASSAY OF TROPONIN QUANT: CPT

## 2017-12-10 PROCEDURE — 74177 CT ABD & PELVIS W/CONTRAST: CPT

## 2017-12-10 PROCEDURE — G0480 DRUG TEST DEF 1-7 CLASSES: HCPCS

## 2017-12-10 PROCEDURE — 85025 COMPLETE CBC W/AUTO DIFF WBC: CPT

## 2017-12-10 PROCEDURE — 83880 ASSAY OF NATRIURETIC PEPTIDE: CPT

## 2017-12-10 PROCEDURE — 6370000000 HC RX 637 (ALT 250 FOR IP): Performed by: EMERGENCY MEDICINE

## 2017-12-10 PROCEDURE — 84443 ASSAY THYROID STIM HORMONE: CPT

## 2017-12-10 PROCEDURE — 80053 COMPREHEN METABOLIC PANEL: CPT

## 2017-12-10 PROCEDURE — 83735 ASSAY OF MAGNESIUM: CPT

## 2017-12-10 PROCEDURE — 82248 BILIRUBIN DIRECT: CPT

## 2017-12-10 PROCEDURE — 6360000002 HC RX W HCPCS: Performed by: EMERGENCY MEDICINE

## 2017-12-10 RX ORDER — OXYCODONE HYDROCHLORIDE AND ACETAMINOPHEN 5; 325 MG/1; MG/1
1 TABLET ORAL ONCE
Status: COMPLETED | OUTPATIENT
Start: 2017-12-11 | End: 2017-12-11

## 2017-12-10 RX ORDER — ONDANSETRON 4 MG/1
4 TABLET, ORALLY DISINTEGRATING ORAL ONCE
Status: COMPLETED | OUTPATIENT
Start: 2017-12-10 | End: 2017-12-10

## 2017-12-10 RX ORDER — PANTOPRAZOLE SODIUM 40 MG/1
40 TABLET, DELAYED RELEASE ORAL ONCE
Status: COMPLETED | OUTPATIENT
Start: 2017-12-10 | End: 2017-12-10

## 2017-12-10 RX ADMIN — ONDANSETRON 4 MG: 4 TABLET, ORALLY DISINTEGRATING ORAL at 22:47

## 2017-12-10 RX ADMIN — PANTOPRAZOLE SODIUM 40 MG: 40 TABLET, DELAYED RELEASE ORAL at 22:47

## 2017-12-10 ASSESSMENT — PAIN DESCRIPTION - FREQUENCY: FREQUENCY: INTERMITTENT

## 2017-12-10 ASSESSMENT — ENCOUNTER SYMPTOMS
ABDOMINAL PAIN: 1
CHEST TIGHTNESS: 0
EYE DISCHARGE: 0
NAUSEA: 1
VOICE CHANGE: 0
DIARRHEA: 0
TROUBLE SWALLOWING: 0
EYE REDNESS: 0
BLOOD IN STOOL: 0
WHEEZING: 0
EYE PAIN: 0
PHOTOPHOBIA: 0
SINUS PRESSURE: 0
CHOKING: 0
RHINORRHEA: 0
COUGH: 0
VOMITING: 1
EYE ITCHING: 0
ABDOMINAL DISTENTION: 0
SORE THROAT: 0
SHORTNESS OF BREATH: 0
CONSTIPATION: 0
BACK PAIN: 0

## 2017-12-10 ASSESSMENT — PAIN DESCRIPTION - LOCATION: LOCATION: ABDOMEN

## 2017-12-10 ASSESSMENT — PAIN SCALES - GENERAL: PAINLEVEL_OUTOF10: 9

## 2017-12-10 ASSESSMENT — PAIN DESCRIPTION - ORIENTATION: ORIENTATION: RIGHT;LOWER

## 2017-12-10 ASSESSMENT — PAIN DESCRIPTION - DESCRIPTORS: DESCRIPTORS: SHARP

## 2017-12-11 VITALS
TEMPERATURE: 98.3 F | HEART RATE: 95 BPM | OXYGEN SATURATION: 98 % | RESPIRATION RATE: 16 BRPM | SYSTOLIC BLOOD PRESSURE: 147 MMHG | BODY MASS INDEX: 31.71 KG/M2 | HEIGHT: 75 IN | DIASTOLIC BLOOD PRESSURE: 93 MMHG | WEIGHT: 255 LBS

## 2017-12-11 PROCEDURE — 6360000004 HC RX CONTRAST MEDICATION: Performed by: EMERGENCY MEDICINE

## 2017-12-11 PROCEDURE — 96374 THER/PROPH/DIAG INJ IV PUSH: CPT

## 2017-12-11 PROCEDURE — 6360000002 HC RX W HCPCS: Performed by: EMERGENCY MEDICINE

## 2017-12-11 PROCEDURE — 6370000000 HC RX 637 (ALT 250 FOR IP): Performed by: EMERGENCY MEDICINE

## 2017-12-11 RX ORDER — ONDANSETRON 4 MG/1
4 TABLET, FILM COATED ORAL EVERY 8 HOURS PRN
Qty: 20 TABLET | Refills: 0 | Status: SHIPPED | OUTPATIENT
Start: 2017-12-11 | End: 2018-04-09

## 2017-12-11 RX ORDER — NAPROXEN 500 MG/1
500 TABLET ORAL 2 TIMES DAILY
Qty: 60 TABLET | Refills: 0 | Status: SHIPPED | OUTPATIENT
Start: 2017-12-11 | End: 2018-04-09

## 2017-12-11 RX ORDER — DICYCLOMINE HYDROCHLORIDE 10 MG/1
10 CAPSULE ORAL
Qty: 120 CAPSULE | Refills: 0 | Status: SHIPPED | OUTPATIENT
Start: 2017-12-11 | End: 2018-04-09 | Stop reason: ALTCHOICE

## 2017-12-11 RX ORDER — KETOROLAC TROMETHAMINE 30 MG/ML
15 INJECTION, SOLUTION INTRAMUSCULAR; INTRAVENOUS ONCE
Status: COMPLETED | OUTPATIENT
Start: 2017-12-11 | End: 2017-12-11

## 2017-12-11 RX ORDER — SUCRALFATE 1 G/1
1 TABLET ORAL 4 TIMES DAILY
Qty: 120 TABLET | Refills: 3 | Status: SHIPPED | OUTPATIENT
Start: 2017-12-11 | End: 2018-04-09

## 2017-12-11 RX ORDER — OMEPRAZOLE 20 MG/1
20 CAPSULE, DELAYED RELEASE ORAL DAILY
Qty: 30 CAPSULE | Refills: 0 | Status: SHIPPED | OUTPATIENT
Start: 2017-12-11 | End: 2018-04-19 | Stop reason: ALTCHOICE

## 2017-12-11 RX ADMIN — KETOROLAC TROMETHAMINE 15 MG: 30 INJECTION, SOLUTION INTRAMUSCULAR at 01:49

## 2017-12-11 RX ADMIN — OXYCODONE HYDROCHLORIDE AND ACETAMINOPHEN 1 TABLET: 5; 325 TABLET ORAL at 00:12

## 2017-12-11 RX ADMIN — IOPAMIDOL 80 ML: 755 INJECTION, SOLUTION INTRAVENOUS at 00:43

## 2017-12-11 ASSESSMENT — PAIN SCALES - GENERAL: PAINLEVEL_OUTOF10: 9

## 2017-12-11 NOTE — ED PROVIDER NOTES
[facility]  eMERGENCY dEPARTMENT eNCOUnter          CHIEF COMPLAINT       Chief Complaint   Patient presents with    Abdominal Pain    Groin Pain       Nurses Notes reviewed and I agree except as noted in the HPI. HISTORY OF PRESENT ILLNESS    Kaden Woodard is a 52 y.o. male who presents to the Emergency Department for the evaluation of RLQ abdominal pain for the past 2 hours. The pt states the pain began while he was getting ready to eat. He states the pain is an intermittent sharp pain without radiation to his testicles or flank. He also c/o nausea and 2 episodes of vomiting. He states he still has his appendix. He denies any diarrhea, fever, or chills. No further complaints at the time of the initial encounter. The HPI was provided by the patient. REVIEW OF SYSTEMS     Review of Systems   Constitutional: Negative for activity change, appetite change, diaphoresis, fatigue and unexpected weight change. HENT: Negative for congestion, ear discharge, ear pain, hearing loss, rhinorrhea, sinus pressure, sore throat, trouble swallowing and voice change. Eyes: Negative for photophobia, pain, discharge, redness and itching. Respiratory: Negative for cough, choking, chest tightness, shortness of breath and wheezing. Cardiovascular: Negative for chest pain, palpitations and leg swelling. Gastrointestinal: Positive for abdominal pain (RLQ), nausea and vomiting (2 episodes). Negative for abdominal distention, blood in stool, constipation and diarrhea. Endocrine: Negative for polydipsia, polyphagia and polyuria. Genitourinary: Negative for decreased urine volume, difficulty urinating, dysuria, enuresis, frequency, hematuria and urgency. Musculoskeletal: Negative for arthralgias, back pain, gait problem, myalgias, neck pain and neck stiffness. Skin: Negative for pallor and rash. Allergic/Immunologic: Negative for immunocompromised state.    Neurological: Negative for dizziness, tremors, seizures, syncope, facial asymmetry, weakness, light-headedness, numbness and headaches. Hematological: Negative for adenopathy. Does not bruise/bleed easily. Psychiatric/Behavioral: Negative for agitation, hallucinations and suicidal ideas. The patient is not nervous/anxious. PAST MEDICAL HISTORY    has a past medical history of Acid reflux; Anemia; Bipolar disorder ([address] Blood clot in vein; Chest pain; Chronic back pain; Chronic bronchitis ([address] Colon polyp; Depression; DVT (deep venous thrombosis) ([address] Esophageal abnormality; Fatty liver disease, nonalcoholic; Furuncle; History of Doppler ultrasound; Hx of blood clots; Hyperlipidemia; Hypertension; Intracranial arachnoid cyst; Irritable bowel syndrome; Liver disease; MDRO (multiple drug resistant organisms) resistance; MRSA (methicillin resistant staph aureus) culture positive; Nephrolithiasis; Neuropathy ([address] Pancreatic insufficiency; Positive SANDY (antinuclear antibody); Pulmonary embolism ([address] Restless legs syndrome; Seizures ([address] Sinus tachycardia; Skull fracture ([address] Sleep apnea; and Type II or unspecified type diabetes mellitus without mention of complication, not stated as uncontrolled. SURGICAL HISTORY      has a past surgical history that includes knee surgery (Left, 2007); Vena Cava Filter Placement (2007); hernia repair (Right, 1996); Cholecystectomy (2004); Upper gastrointestinal endoscopy (2012); transthoracic echocardiogram (3-05-11); other surgical history (5-31-11); Cardiac catheterization; Cardiac catheterization (3-2012); craniotomy (11/2012); Tonsillectomy; Endoscopy, colon, diagnostic; EKG 12 Lead (10/18/2015); Knee arthroscopy (Right, 2016); Cardiac catheterization (04/28/2016); Upper gastrointestinal endoscopy (2016); Inguinal hernia repair (Right, 09/15/2016); Colonoscopy (2012);  Colonoscopy (08/2016); other surgical history (01/20/2017); shoulder surgery (Right, 01/2007); and Carpal tunnel release MG TABLET    Take 1-2 tablets by mouth every 8 hours as needed for Nausea    ROPINIROLE (REQUIP) 1 MG TABLET    TAKE 1 TABLET BY MOUTH THREE TIMES A DAY    SILDENAFIL (VIAGRA) 100 MG TABLET    Take 1 tablet by mouth as needed for Erectile Dysfunction    ZONISAMIDE (ZONEGRAN) 100 MG CAPSULE    Take 400 mg by mouth nightly        ALLERGIES     is allergic to ciprofloxacin-ciproflox hcl er; heparin; metformin; niacin and related; tramadol hcl; ultram [tramadol]; and warfarin. FAMILY HISTORY     indicated that his mother is alive. He indicated that his father is . He indicated that his sister is alive. He indicated that three of his five brothers are alive. He indicated that the status of his daughter is unknown.    family history includes Arthritis in his mother; Diabetes in his daughter; Heart Disease (age of onset: 61) in his father; Obesity in his brother and sister; Other in his brother; Seizures in his brother and mother; Stroke in his brother. SOCIAL HISTORY      reports that he has never smoked. He has never used smokeless tobacco. He reports that he does not drink alcohol or use drugs. PHYSICAL EXAM     INITIAL VITALS:  height is 6' 3\" (1.905 m) and weight is 255 lb (115.7 kg). His oral temperature is 98.3 °F (36.8 °C). His blood pressure is 147/93 (abnormal) and his pulse is 95. His respiration is 16 and oxygen saturation is 98%. Physical Exam   Constitutional: He is oriented to person, place, and time. He appears well-developed and well-nourished. No distress. HENT:   Head: Normocephalic and atraumatic. Right Ear: External ear normal.   Left Ear: External ear normal.   Nose: Nose normal.   Mouth/Throat: Oropharynx is clear and moist. No oropharyngeal exudate. Eyes: Conjunctivae and EOM are normal. Pupils are equal, round, and reactive to light. Right eye exhibits no discharge. Left eye exhibits no discharge. No scleral icterus. Neck: Normal range of motion. Neck supple.  No JVD

## 2017-12-12 ENCOUNTER — CARE COORDINATION (OUTPATIENT)
Dept: CASE MANAGEMENT | Age: 47
End: 2017-12-12

## 2017-12-12 NOTE — CARE COORDINATION
Care Transition  ED Follow up Call    Reason for ED visit:  Gastritis   How are you feeling? Same  Status:     not changed      Do you have any questions related to your discharge instructions? no    Review of Instructions:    Discharged with new prescription? yes - Bentyl, Naprosyn, omeprazole, ondansetron, carafate   Review Medications:  No: Declined   Do you have any questions related to your medications? no    Understands what to report/when to return?:  Yes   Do you have a follow up appointment scheduled? No  Do you have any needs or concerns I can assist you with? no     FU appts/Provider:    Future Appointments  Date Time Provider Staci Jay   12/22/2017 11:00 AM Alfrieda Hammans, MD [address] [facility] VICKIE AM [place] [address]   4/2/2018 1:30 PM NICANOR Mendes Endo [facility] [place]   4/3/2018 10:00 AM Ken Woodruff MD [address] [facility]     Summary: Andrzej Mendoza returned call. Introduced self/role. States he did not fill prescriptions, he has tried them in the past and they did not help. Offered to schedule follow up appointments. Stated he would take care of that on his own. Denies further needs at this time.

## 2017-12-18 DIAGNOSIS — E11.40 TYPE 2 DIABETES MELLITUS WITH DIABETIC NEUROPATHY, UNSPECIFIED LONG TERM INSULIN USE STATUS: Primary | ICD-10-CM

## 2017-12-18 RX ORDER — L-METHYLFOLATE-ALGAE-VIT B12-B6 CAP 3-90.314-2-35 MG 3-90.314-2-35 MG
1 CAP ORAL DAILY
Qty: 60 CAPSULE | Refills: 3 | Status: SHIPPED | OUTPATIENT
Start: 2017-12-18 | End: 2018-02-07 | Stop reason: SDUPTHER

## 2017-12-27 ENCOUNTER — TELEPHONE (OUTPATIENT)
Dept: ENDOCRINOLOGY | Age: 47
End: 2017-12-27

## 2017-12-27 DIAGNOSIS — E55.9 VITAMIN D DEFICIENCY: Primary | ICD-10-CM

## 2018-01-25 ENCOUNTER — TELEPHONE (OUTPATIENT)
Dept: INTERNAL MEDICINE CLINIC | Age: 48
End: 2018-01-25

## 2018-02-07 ENCOUNTER — TELEPHONE (OUTPATIENT)
Dept: ENDOCRINOLOGY | Age: 48
End: 2018-02-07

## 2018-02-07 DIAGNOSIS — E11.40 TYPE 2 DIABETES MELLITUS WITH DIABETIC NEUROPATHY, UNSPECIFIED LONG TERM INSULIN USE STATUS: ICD-10-CM

## 2018-02-07 RX ORDER — L-METHYLFOLATE-ALGAE-VIT B12-B6 CAP 3-90.314-2-35 MG 3-90.314-2-35 MG
1 CAP ORAL DAILY
Qty: 60 CAPSULE | Refills: 3 | Status: SHIPPED | OUTPATIENT
Start: 2018-02-07 | End: 2018-10-16

## 2018-02-07 NOTE — TELEPHONE ENCOUNTER
Attempted to do a prior authorization pt was denied and its needing a appeal. would like to file an appeal this is for his neurapathy due to his diabetes.

## 2018-02-21 ENCOUNTER — HOSPITAL ENCOUNTER (OUTPATIENT)
Dept: PHYSICAL THERAPY | Age: 48
Setting detail: THERAPIES SERIES
Discharge: HOME | End: 2018-02-21
Payer: MEDICARE

## 2018-02-21 PROCEDURE — 97162 PT EVAL MOD COMPLEX 30 MIN: CPT

## 2018-02-21 PROCEDURE — G8979 MOBILITY GOAL STATUS: HCPCS

## 2018-02-21 PROCEDURE — G8978 MOBILITY CURRENT STATUS: HCPCS

## 2018-02-21 PROCEDURE — 97110 THERAPEUTIC EXERCISES: CPT

## 2018-02-21 ASSESSMENT — PAIN DESCRIPTION - LOCATION: LOCATION: FOOT

## 2018-02-21 ASSESSMENT — PAIN SCALES - GENERAL: PAINLEVEL_OUTOF10: 8

## 2018-02-27 ENCOUNTER — TELEPHONE (OUTPATIENT)
Dept: INTERNAL MEDICINE CLINIC | Age: 48
End: 2018-02-27

## 2018-02-27 ENCOUNTER — HOSPITAL ENCOUNTER (OUTPATIENT)
Dept: PHYSICAL THERAPY | Age: 48
Setting detail: THERAPIES SERIES
End: 2018-02-27
Payer: MEDICARE

## 2018-02-28 ENCOUNTER — TELEPHONE (OUTPATIENT)
Dept: ENDOCRINOLOGY | Age: 48
End: 2018-02-28

## 2018-02-28 NOTE — PROGRESS NOTES
[address] [facility]    Patient Name: Reilly Philippe        CSN: [id number]   YOB: 1970  Gender: male  Referring Practitioner: Audrey Quinones DO  Diagnosis: M51.36 (ICD-10-CM) - Other intervertebral disc degeneration, lumbar region, M54.16 (ICD-10-CM) - Radiculopathy, lumbar region    Patient is discharged from Physical Therapy services at this time. See last note for details related to results of therapy and goal achievement. Reason for discharge:  Patient called and reported to be discharged per doctor. Please see previous notes for further details. Carlita Davidson  [id number], [address] [facility]

## 2018-03-08 DIAGNOSIS — G25.81 RESTLESS LEGS SYNDROME (RLS): ICD-10-CM

## 2018-03-08 RX ORDER — ROPINIROLE 1 MG/1
TABLET, FILM COATED ORAL
Qty: 270 TABLET | Refills: 1 | Status: SHIPPED | OUTPATIENT
Start: 2018-03-08 | End: 2018-10-16 | Stop reason: SDUPTHER

## 2018-03-23 ENCOUNTER — HOSPITAL ENCOUNTER (OUTPATIENT)
Dept: PHYSICAL THERAPY | Age: 48
Setting detail: THERAPIES SERIES
Discharge: HOME | End: 2018-03-23
Payer: MEDICARE

## 2018-04-09 ENCOUNTER — OFFICE VISIT (OUTPATIENT)
Dept: INTERNAL MEDICINE CLINIC | Age: 48
End: 2018-04-09
Payer: MEDICARE

## 2018-04-09 VITALS
BODY MASS INDEX: 33.82 KG/M2 | DIASTOLIC BLOOD PRESSURE: 72 MMHG | SYSTOLIC BLOOD PRESSURE: 124 MMHG | HEART RATE: 76 BPM | WEIGHT: 272 LBS | HEIGHT: 75 IN

## 2018-04-09 DIAGNOSIS — E11.40 TYPE 2 DIABETES MELLITUS WITH DIABETIC NEUROPATHY, WITH LONG-TERM CURRENT USE OF INSULIN (HCC): Primary | ICD-10-CM

## 2018-04-09 DIAGNOSIS — R07.89 OTHER CHEST PAIN: ICD-10-CM

## 2018-04-09 DIAGNOSIS — E78.2 MIXED HYPERLIPIDEMIA: ICD-10-CM

## 2018-04-09 DIAGNOSIS — Z79.4 TYPE 2 DIABETES MELLITUS WITH DIABETIC NEUROPATHY, WITH LONG-TERM CURRENT USE OF INSULIN (HCC): Primary | ICD-10-CM

## 2018-04-09 DIAGNOSIS — K21.9 GASTROESOPHAGEAL REFLUX DISEASE WITHOUT ESOPHAGITIS: ICD-10-CM

## 2018-04-09 DIAGNOSIS — I15.2 HYPERTENSION ASSOCIATED WITH DIABETES (HCC): ICD-10-CM

## 2018-04-09 DIAGNOSIS — R11.2 NON-INTRACTABLE VOMITING WITH NAUSEA, UNSPECIFIED VOMITING TYPE: ICD-10-CM

## 2018-04-09 DIAGNOSIS — G62.9 NEUROPATHY: ICD-10-CM

## 2018-04-09 DIAGNOSIS — E55.9 VITAMIN D DEFICIENCY: ICD-10-CM

## 2018-04-09 DIAGNOSIS — E11.59 HYPERTENSION ASSOCIATED WITH DIABETES (HCC): ICD-10-CM

## 2018-04-09 PROCEDURE — 3045F PR MOST RECENT HEMOGLOBIN A1C LEVEL 7.0-9.0%: CPT | Performed by: INTERNAL MEDICINE

## 2018-04-09 PROCEDURE — 1036F TOBACCO NON-USER: CPT | Performed by: INTERNAL MEDICINE

## 2018-04-09 PROCEDURE — G8427 DOCREV CUR MEDS BY ELIG CLIN: HCPCS | Performed by: INTERNAL MEDICINE

## 2018-04-09 PROCEDURE — 2022F DILAT RTA XM EVC RTNOPTHY: CPT | Performed by: INTERNAL MEDICINE

## 2018-04-09 PROCEDURE — 99214 OFFICE O/P EST MOD 30 MIN: CPT | Performed by: INTERNAL MEDICINE

## 2018-04-09 PROCEDURE — G8417 CALC BMI ABV UP PARAM F/U: HCPCS | Performed by: INTERNAL MEDICINE

## 2018-04-09 RX ORDER — DIVALPROEX SODIUM 500 MG/1
500 TABLET, DELAYED RELEASE ORAL 2 TIMES DAILY
Status: ON HOLD | COMMUNITY
Start: 2018-04-05 | End: 2021-08-04 | Stop reason: HOSPADM

## 2018-04-09 RX ORDER — DULOXETIN HYDROCHLORIDE 60 MG/1
60 CAPSULE, DELAYED RELEASE ORAL DAILY
COMMUNITY
Start: 2018-04-05 | End: 2018-12-05 | Stop reason: DRUGHIGH

## 2018-04-09 RX ORDER — PROMETHAZINE HYDROCHLORIDE 12.5 MG/1
12.5-25 TABLET ORAL EVERY 8 HOURS PRN
Qty: 60 TABLET | Refills: 2 | Status: SHIPPED | OUTPATIENT
Start: 2018-04-09 | End: 2018-12-04 | Stop reason: SDUPTHER

## 2018-04-09 RX ORDER — GABAPENTIN 600 MG/1
600 TABLET ORAL 4 TIMES DAILY
Qty: 360 TABLET | Refills: 1 | Status: CANCELLED | OUTPATIENT
Start: 2018-04-09

## 2018-04-09 RX ORDER — FENOFIBRATE 145 MG/1
145 TABLET, COATED ORAL DAILY
Qty: 90 TABLET | Refills: 1 | Status: SHIPPED | OUTPATIENT
Start: 2018-04-09 | End: 2018-10-16 | Stop reason: SDUPTHER

## 2018-04-09 RX ORDER — PANTOPRAZOLE SODIUM 40 MG/1
40 TABLET, DELAYED RELEASE ORAL 2 TIMES DAILY
Qty: 180 TABLET | Refills: 1 | Status: SHIPPED | OUTPATIENT
Start: 2018-04-09 | End: 2018-10-16 | Stop reason: SDUPTHER

## 2018-04-11 ENCOUNTER — HOSPITAL ENCOUNTER (OUTPATIENT)
Age: 48
Discharge: HOME | End: 2018-04-11
Payer: MEDICARE

## 2018-04-11 DIAGNOSIS — E78.2 MIXED HYPERLIPIDEMIA: ICD-10-CM

## 2018-04-11 DIAGNOSIS — E11.40 TYPE 2 DIABETES MELLITUS WITH DIABETIC NEUROPATHY, WITH LONG-TERM CURRENT USE OF INSULIN (HCC): ICD-10-CM

## 2018-04-11 DIAGNOSIS — Z79.4 TYPE 2 DIABETES MELLITUS WITH DIABETIC NEUROPATHY, WITH LONG-TERM CURRENT USE OF INSULIN (HCC): ICD-10-CM

## 2018-04-11 DIAGNOSIS — E55.9 VITAMIN D DEFICIENCY: ICD-10-CM

## 2018-04-11 LAB
ALBUMIN SERPL-MCNC: 4.3 G/DL (ref 3.5–5.1)
ALP BLD-CCNC: 102 U/L (ref 38–126)
ALT SERPL-CCNC: < 5 U/L (ref 11–66)
ANION GAP SERPL CALCULATED.3IONS-SCNC: 18 MEQ/L (ref 8–16)
AST SERPL-CCNC: < 5 U/L (ref 5–40)
AVERAGE GLUCOSE: 174 MG/DL (ref 70–126)
BILIRUB SERPL-MCNC: 1.1 MG/DL (ref 0.3–1.2)
BUN BLDV-MCNC: 7 MG/DL (ref 7–22)
CALCIUM SERPL-MCNC: 9.5 MG/DL (ref 8.5–10.5)
CHLORIDE BLD-SCNC: 99 MEQ/L (ref 98–111)
CO2: 26 MEQ/L (ref 23–33)
CREAT SERPL-MCNC: 0.7 MG/DL (ref 0.4–1.2)
GFR SERPL CREATININE-BSD FRML MDRD: > 90 ML/MIN/1.73M2
GLUCOSE BLD-MCNC: 170 MG/DL (ref 70–108)
HBA1C MFR BLD: 7.8 % (ref 4.4–6.4)
POTASSIUM SERPL-SCNC: 3.4 MEQ/L (ref 3.5–5.2)
SODIUM BLD-SCNC: 143 MEQ/L (ref 135–145)
TOTAL PROTEIN: 7.5 G/DL (ref 6.1–8)
VITAMIN D 25-HYDROXY: 20 NG/ML (ref 30–100)

## 2018-04-11 PROCEDURE — 82306 VITAMIN D 25 HYDROXY: CPT

## 2018-04-11 PROCEDURE — 36415 COLL VENOUS BLD VENIPUNCTURE: CPT

## 2018-04-11 PROCEDURE — 80053 COMPREHEN METABOLIC PANEL: CPT

## 2018-04-11 PROCEDURE — 83036 HEMOGLOBIN GLYCOSYLATED A1C: CPT

## 2018-04-12 ENCOUNTER — TELEPHONE (OUTPATIENT)
Dept: INTERNAL MEDICINE CLINIC | Age: 48
End: 2018-04-12

## 2018-04-13 RX ORDER — POTASSIUM CHLORIDE 20 MEQ/1
20 TABLET, EXTENDED RELEASE ORAL DAILY
Qty: 14 TABLET | Refills: 0 | OUTPATIENT
Start: 2018-04-13 | End: 2018-05-15 | Stop reason: ALTCHOICE

## 2018-04-14 ENCOUNTER — HOSPITAL ENCOUNTER (OUTPATIENT)
Age: 48
Setting detail: OBSERVATION
Discharge: HOME | End: 2018-04-15
Attending: FAMILY MEDICINE | Admitting: INTERNAL MEDICINE
Payer: MEDICARE

## 2018-04-14 ENCOUNTER — APPOINTMENT (OUTPATIENT)
Dept: CT IMAGING | Age: 48
End: 2018-04-14
Payer: MEDICARE

## 2018-04-14 DIAGNOSIS — J18.9 PNEUMONITIS: ICD-10-CM

## 2018-04-14 DIAGNOSIS — R93.89 ABNORMAL CT SCAN: ICD-10-CM

## 2018-04-14 DIAGNOSIS — R07.9 CHEST PAIN, UNSPECIFIED TYPE: Primary | ICD-10-CM

## 2018-04-14 LAB
ALBUMIN SERPL-MCNC: 4.5 G/DL (ref 3.5–5.1)
ALP BLD-CCNC: 101 U/L (ref 38–126)
ALT SERPL-CCNC: 39 U/L (ref 11–66)
AMPHETAMINE+METHAMPHETAMINE URINE SCREEN: NEGATIVE
ANION GAP SERPL CALCULATED.3IONS-SCNC: 15 MEQ/L (ref 8–16)
AST SERPL-CCNC: 26 U/L (ref 5–40)
BARBITURATE QUANTITATIVE URINE: NEGATIVE
BASOPHILS # BLD: 0.6 %
BASOPHILS ABSOLUTE: 0 THOU/MM3 (ref 0–0.1)
BENZODIAZEPINE QUANTITATIVE URINE: NEGATIVE
BILIRUB SERPL-MCNC: 1.1 MG/DL (ref 0.3–1.2)
BILIRUBIN DIRECT: < 0.2 MG/DL (ref 0–0.3)
BUN BLDV-MCNC: 5 MG/DL (ref 7–22)
C-REACTIVE PROTEIN: 0.18 MG/DL (ref 0–1)
CALCIUM SERPL-MCNC: 9.8 MG/DL (ref 8.5–10.5)
CANNABINOID QUANTITATIVE URINE: NEGATIVE
CHLORIDE BLD-SCNC: 100 MEQ/L (ref 98–111)
CO2: 26 MEQ/L (ref 23–33)
COCAINE METABOLITE QUANTITATIVE URINE: NEGATIVE
CREAT SERPL-MCNC: 0.6 MG/DL (ref 0.4–1.2)
EKG ATRIAL RATE: 89 BPM
EKG ATRIAL RATE: 99 BPM
EKG P AXIS: 30 DEGREES
EKG P AXIS: 49 DEGREES
EKG P-R INTERVAL: 174 MS
EKG P-R INTERVAL: 174 MS
EKG Q-T INTERVAL: 328 MS
EKG Q-T INTERVAL: 348 MS
EKG QRS DURATION: 76 MS
EKG QRS DURATION: 84 MS
EKG QTC CALCULATION (BAZETT): 420 MS
EKG QTC CALCULATION (BAZETT): 423 MS
EKG R AXIS: 32 DEGREES
EKG R AXIS: 39 DEGREES
EKG T AXIS: -7 DEGREES
EKG T AXIS: 34 DEGREES
EKG VENTRICULAR RATE: 89 BPM
EKG VENTRICULAR RATE: 99 BPM
EOSINOPHIL # BLD: 2.7 %
EOSINOPHILS ABSOLUTE: 0.2 THOU/MM3 (ref 0–0.4)
ETHYL ALCOHOL, SERUM: < 0.01 %
FLU A ANTIGEN: NEGATIVE
FLU B ANTIGEN: NEGATIVE
GFR SERPL CREATININE-BSD FRML MDRD: > 90 ML/MIN/1.73M2
GLUCOSE BLD-MCNC: 106 MG/DL
GLUCOSE BLD-MCNC: 106 MG/DL (ref 70–108)
GLUCOSE BLD-MCNC: 133 MG/DL (ref 70–108)
GLUCOSE BLD-MCNC: 164 MG/DL (ref 70–108)
GLUCOSE BLD-MCNC: 401 MG/DL (ref 70–108)
GLUCOSE BLD-MCNC: 77 MG/DL (ref 70–108)
HCT VFR BLD CALC: 46.4 % (ref 42–52)
HEMOGLOBIN: 16.6 GM/DL (ref 14–18)
LYMPHOCYTES # BLD: 38.8 %
LYMPHOCYTES ABSOLUTE: 2.6 THOU/MM3 (ref 1–4.8)
MCH RBC QN AUTO: 32 PG (ref 27–31)
MCHC RBC AUTO-ENTMCNC: 35.9 GM/DL (ref 33–37)
MCV RBC AUTO: 89.2 FL (ref 80–94)
MONOCYTES # BLD: 6 %
MONOCYTES ABSOLUTE: 0.4 THOU/MM3 (ref 0.4–1.3)
NUCLEATED RED BLOOD CELLS: 0 /100 WBC
OPIATES, URINE: NEGATIVE
OSMOLALITY CALCULATION: 282.2 MOSMOL/KG (ref 275–300)
OXYCODONE: NEGATIVE
PDW BLD-RTO: 13.3 % (ref 11.5–14.5)
PHENCYCLIDINE QUANTITATIVE URINE: NEGATIVE
PLATELET # BLD: 193 THOU/MM3 (ref 130–400)
PMV BLD AUTO: 8.6 FL (ref 7.4–10.4)
POTASSIUM SERPL-SCNC: 3.6 MEQ/L (ref 3.5–5.2)
PRO-BNP: 11.2 PG/ML (ref 0–450)
RBC # BLD: 5.2 MILL/MM3 (ref 4.7–6.1)
SEDIMENTATION RATE, ERYTHROCYTE: 1 MM/HR (ref 0–10)
SEG NEUTROPHILS: 51.9 %
SEGMENTED NEUTROPHILS ABSOLUTE COUNT: 3.5 THOU/MM3 (ref 1.8–7.7)
SODIUM BLD-SCNC: 141 MEQ/L (ref 135–145)
TOTAL PROTEIN: 7.8 G/DL (ref 6.1–8)
TROPONIN T: < 0.01 NG/ML
WBC # BLD: 6.7 THOU/MM3 (ref 4.8–10.8)

## 2018-04-14 PROCEDURE — 93010 ELECTROCARDIOGRAM REPORT: CPT | Performed by: NUCLEAR MEDICINE

## 2018-04-14 PROCEDURE — 70450 CT HEAD/BRAIN W/O DYE: CPT

## 2018-04-14 PROCEDURE — S0028 INJECTION, FAMOTIDINE, 20 MG: HCPCS | Performed by: FAMILY MEDICINE

## 2018-04-14 PROCEDURE — 6360000004 HC RX CONTRAST MEDICATION: Performed by: FAMILY MEDICINE

## 2018-04-14 PROCEDURE — 85025 COMPLETE CBC W/AUTO DIFF WBC: CPT

## 2018-04-14 PROCEDURE — 80307 DRUG TEST PRSMV CHEM ANLYZR: CPT

## 2018-04-14 PROCEDURE — 71275 CT ANGIOGRAPHY CHEST: CPT

## 2018-04-14 PROCEDURE — 2580000003 HC RX 258: Performed by: FAMILY MEDICINE

## 2018-04-14 PROCEDURE — 93005 ELECTROCARDIOGRAM TRACING: CPT | Performed by: FAMILY MEDICINE

## 2018-04-14 PROCEDURE — G0378 HOSPITAL OBSERVATION PER HR: HCPCS

## 2018-04-14 PROCEDURE — 82948 REAGENT STRIP/BLOOD GLUCOSE: CPT

## 2018-04-14 PROCEDURE — 99285 EMERGENCY DEPT VISIT HI MDM: CPT

## 2018-04-14 PROCEDURE — 36415 COLL VENOUS BLD VENIPUNCTURE: CPT

## 2018-04-14 PROCEDURE — 6370000000 HC RX 637 (ALT 250 FOR IP): Performed by: FAMILY MEDICINE

## 2018-04-14 PROCEDURE — 87804 INFLUENZA ASSAY W/OPTIC: CPT

## 2018-04-14 PROCEDURE — 83880 ASSAY OF NATRIURETIC PEPTIDE: CPT

## 2018-04-14 PROCEDURE — 80053 COMPREHEN METABOLIC PANEL: CPT

## 2018-04-14 PROCEDURE — 85651 RBC SED RATE NONAUTOMATED: CPT

## 2018-04-14 PROCEDURE — 84484 ASSAY OF TROPONIN QUANT: CPT

## 2018-04-14 PROCEDURE — 6360000002 HC RX W HCPCS: Performed by: FAMILY MEDICINE

## 2018-04-14 PROCEDURE — G0480 DRUG TEST DEF 1-7 CLASSES: HCPCS

## 2018-04-14 PROCEDURE — 2500000003 HC RX 250 WO HCPCS: Performed by: FAMILY MEDICINE

## 2018-04-14 PROCEDURE — 82248 BILIRUBIN DIRECT: CPT

## 2018-04-14 PROCEDURE — 96374 THER/PROPH/DIAG INJ IV PUSH: CPT

## 2018-04-14 PROCEDURE — 86140 C-REACTIVE PROTEIN: CPT

## 2018-04-14 PROCEDURE — 96375 TX/PRO/DX INJ NEW DRUG ADDON: CPT

## 2018-04-14 RX ORDER — ONDANSETRON 2 MG/ML
4 INJECTION INTRAMUSCULAR; INTRAVENOUS EVERY 6 HOURS PRN
Status: DISCONTINUED | OUTPATIENT
Start: 2018-04-14 | End: 2018-04-15 | Stop reason: HOSPADM

## 2018-04-14 RX ORDER — GABAPENTIN 600 MG/1
600 TABLET ORAL 4 TIMES DAILY
Status: DISCONTINUED | OUTPATIENT
Start: 2018-04-15 | End: 2018-04-15 | Stop reason: HOSPADM

## 2018-04-14 RX ORDER — FENOFIBRATE 160 MG/1
160 TABLET ORAL DAILY
Status: DISCONTINUED | OUTPATIENT
Start: 2018-04-15 | End: 2018-04-15 | Stop reason: HOSPADM

## 2018-04-14 RX ORDER — LEVETIRACETAM 500 MG/1
2000 TABLET ORAL 2 TIMES DAILY
Status: DISCONTINUED | OUTPATIENT
Start: 2018-04-15 | End: 2018-04-15 | Stop reason: HOSPADM

## 2018-04-14 RX ORDER — MORPHINE SULFATE 2 MG/ML
2 INJECTION, SOLUTION INTRAMUSCULAR; INTRAVENOUS ONCE
Status: COMPLETED | OUTPATIENT
Start: 2018-04-14 | End: 2018-04-14

## 2018-04-14 RX ORDER — ROPINIROLE 1 MG/1
1 TABLET, FILM COATED ORAL NIGHTLY
Status: DISCONTINUED | OUTPATIENT
Start: 2018-04-15 | End: 2018-04-15 | Stop reason: ALTCHOICE

## 2018-04-14 RX ORDER — ZONISAMIDE 100 MG/1
500 CAPSULE ORAL NIGHTLY
Status: DISCONTINUED | OUTPATIENT
Start: 2018-04-15 | End: 2018-04-15 | Stop reason: HOSPADM

## 2018-04-14 RX ORDER — SODIUM CHLORIDE 0.9 % (FLUSH) 0.9 %
10 SYRINGE (ML) INJECTION PRN
Status: DISCONTINUED | OUTPATIENT
Start: 2018-04-14 | End: 2018-04-15 | Stop reason: HOSPADM

## 2018-04-14 RX ORDER — OMEGA-3-ACID ETHYL ESTERS 1 G/1
2 CAPSULE, LIQUID FILLED ORAL 2 TIMES DAILY
Status: DISCONTINUED | OUTPATIENT
Start: 2018-04-15 | End: 2018-04-15 | Stop reason: HOSPADM

## 2018-04-14 RX ORDER — AZITHROMYCIN 250 MG/1
TABLET, FILM COATED ORAL
Qty: 1 PACKET | Refills: 0 | Status: SHIPPED | OUTPATIENT
Start: 2018-04-14 | End: 2018-04-19 | Stop reason: CLARIF

## 2018-04-14 RX ORDER — ACETAMINOPHEN 325 MG/1
650 TABLET ORAL EVERY 4 HOURS PRN
Status: DISCONTINUED | OUTPATIENT
Start: 2018-04-14 | End: 2018-04-15 | Stop reason: HOSPADM

## 2018-04-14 RX ORDER — 0.9 % SODIUM CHLORIDE 0.9 %
1000 INTRAVENOUS SOLUTION INTRAVENOUS ONCE
Status: COMPLETED | OUTPATIENT
Start: 2018-04-14 | End: 2018-04-14

## 2018-04-14 RX ORDER — SODIUM CHLORIDE 0.9 % (FLUSH) 0.9 %
10 SYRINGE (ML) INJECTION EVERY 12 HOURS SCHEDULED
Status: DISCONTINUED | OUTPATIENT
Start: 2018-04-15 | End: 2018-04-15 | Stop reason: HOSPADM

## 2018-04-14 RX ORDER — METOPROLOL SUCCINATE 50 MG/1
50 TABLET, EXTENDED RELEASE ORAL DAILY
Status: ON HOLD | COMMUNITY
End: 2018-04-15 | Stop reason: HOSPADM

## 2018-04-14 RX ORDER — ASPIRIN 81 MG/1
324 TABLET, CHEWABLE ORAL ONCE
Status: COMPLETED | OUTPATIENT
Start: 2018-04-14 | End: 2018-04-14

## 2018-04-14 RX ORDER — DIVALPROEX SODIUM 500 MG/1
500 TABLET, DELAYED RELEASE ORAL 2 TIMES DAILY
Status: DISCONTINUED | OUTPATIENT
Start: 2018-04-15 | End: 2018-04-15 | Stop reason: HOSPADM

## 2018-04-14 RX ORDER — PROMETHAZINE HYDROCHLORIDE 25 MG/1
12.5 TABLET ORAL EVERY 8 HOURS PRN
Status: DISCONTINUED | OUTPATIENT
Start: 2018-04-14 | End: 2018-04-15 | Stop reason: HOSPADM

## 2018-04-14 RX ORDER — DULOXETIN HYDROCHLORIDE 60 MG/1
60 CAPSULE, DELAYED RELEASE ORAL DAILY
Status: DISCONTINUED | OUTPATIENT
Start: 2018-04-15 | End: 2018-04-15 | Stop reason: HOSPADM

## 2018-04-14 RX ORDER — LORAZEPAM 0.5 MG/1
0.5 TABLET ORAL 2 TIMES DAILY PRN
Status: DISCONTINUED | OUTPATIENT
Start: 2018-04-14 | End: 2018-04-15 | Stop reason: HOSPADM

## 2018-04-14 RX ORDER — ACETAMINOPHEN 325 MG/1
650 TABLET ORAL ONCE
Status: COMPLETED | OUTPATIENT
Start: 2018-04-14 | End: 2018-04-14

## 2018-04-14 RX ORDER — LACOSAMIDE 50 MG/1
200 TABLET ORAL 2 TIMES DAILY
Status: DISCONTINUED | OUTPATIENT
Start: 2018-04-15 | End: 2018-04-15 | Stop reason: ALTCHOICE

## 2018-04-14 RX ORDER — L-METHYLFOLATE-ALGAE-VIT B12-B6 CAP 3-90.314-2-35 MG 3-90.314-2-35 MG
1 CAP ORAL DAILY
Status: DISCONTINUED | OUTPATIENT
Start: 2018-04-15 | End: 2018-04-14 | Stop reason: CLARIF

## 2018-04-14 RX ORDER — PANTOPRAZOLE SODIUM 40 MG/1
40 TABLET, DELAYED RELEASE ORAL
Status: DISCONTINUED | OUTPATIENT
Start: 2018-04-15 | End: 2018-04-15 | Stop reason: HOSPADM

## 2018-04-14 RX ORDER — NITROGLYCERIN 0.4 MG/1
0.4 TABLET SUBLINGUAL ONCE
Status: COMPLETED | OUTPATIENT
Start: 2018-04-14 | End: 2018-04-14

## 2018-04-14 RX ORDER — ATORVASTATIN CALCIUM 10 MG/1
10 TABLET, FILM COATED ORAL DAILY
Status: DISCONTINUED | OUTPATIENT
Start: 2018-04-15 | End: 2018-04-15 | Stop reason: HOSPADM

## 2018-04-14 RX ADMIN — Medication 30 ML: at 16:50

## 2018-04-14 RX ADMIN — NITROGLYCERIN 0.4 MG: 0.4 TABLET SUBLINGUAL at 20:56

## 2018-04-14 RX ADMIN — IOPAMIDOL 80 ML: 755 INJECTION, SOLUTION INTRAVENOUS at 17:17

## 2018-04-14 RX ADMIN — FAMOTIDINE 20 MG: 10 INJECTION, SOLUTION INTRAVENOUS at 16:51

## 2018-04-14 RX ADMIN — ACETAMINOPHEN 650 MG: 325 TABLET ORAL at 16:50

## 2018-04-14 RX ADMIN — ASPIRIN 81 MG 324 MG: 81 TABLET ORAL at 16:49

## 2018-04-14 RX ADMIN — MORPHINE SULFATE 2 MG: 2 INJECTION, SOLUTION INTRAMUSCULAR; INTRAVENOUS at 22:22

## 2018-04-14 RX ADMIN — SODIUM CHLORIDE 1000 ML: 9 INJECTION, SOLUTION INTRAVENOUS at 16:46

## 2018-04-14 ASSESSMENT — PAIN DESCRIPTION - ONSET: ONSET: ON-GOING

## 2018-04-14 ASSESSMENT — PAIN SCALES - GENERAL
PAINLEVEL_OUTOF10: 4
PAINLEVEL_OUTOF10: 3
PAINLEVEL_OUTOF10: 5
PAINLEVEL_OUTOF10: 2
PAINLEVEL_OUTOF10: 4
PAINLEVEL_OUTOF10: 5
PAINLEVEL_OUTOF10: 5

## 2018-04-14 ASSESSMENT — PAIN DESCRIPTION - FREQUENCY
FREQUENCY: CONTINUOUS

## 2018-04-14 ASSESSMENT — PAIN DESCRIPTION - PAIN TYPE
TYPE: ACUTE PAIN

## 2018-04-14 ASSESSMENT — ENCOUNTER SYMPTOMS
WHEEZING: 0
RHINORRHEA: 0
NAUSEA: 0
SORE THROAT: 0
SHORTNESS OF BREATH: 1
ABDOMINAL PAIN: 0
EYE REDNESS: 0
EYE DISCHARGE: 0
COUGH: 0
VOMITING: 0
BACK PAIN: 0
DIARRHEA: 0

## 2018-04-14 ASSESSMENT — HEART SCORE: ECG: 0

## 2018-04-14 ASSESSMENT — PAIN DESCRIPTION - LOCATION
LOCATION: CHEST

## 2018-04-14 ASSESSMENT — PAIN DESCRIPTION - PROGRESSION
CLINICAL_PROGRESSION: NOT CHANGED
CLINICAL_PROGRESSION: GRADUALLY WORSENING
CLINICAL_PROGRESSION: GRADUALLY IMPROVING
CLINICAL_PROGRESSION: GRADUALLY IMPROVING

## 2018-04-14 ASSESSMENT — PAIN DESCRIPTION - DESCRIPTORS
DESCRIPTORS: PRESSURE
DESCRIPTORS: BURNING;PRESSURE;TIGHTNESS

## 2018-04-14 ASSESSMENT — PAIN DESCRIPTION - ORIENTATION
ORIENTATION: MID
ORIENTATION: LEFT
ORIENTATION: MID

## 2018-04-15 ENCOUNTER — TELEPHONE (OUTPATIENT)
Dept: INTERNAL MEDICINE CLINIC | Age: 48
End: 2018-04-15

## 2018-04-15 VITALS
WEIGHT: 275.8 LBS | HEART RATE: 87 BPM | RESPIRATION RATE: 16 BRPM | HEIGHT: 75 IN | TEMPERATURE: 97.6 F | BODY MASS INDEX: 34.29 KG/M2 | SYSTOLIC BLOOD PRESSURE: 139 MMHG | DIASTOLIC BLOOD PRESSURE: 87 MMHG | OXYGEN SATURATION: 94 %

## 2018-04-15 LAB
ALBUMIN SERPL-MCNC: 3.8 G/DL (ref 3.5–5.1)
ALP BLD-CCNC: 73 U/L (ref 38–126)
ALT SERPL-CCNC: 33 U/L (ref 11–66)
ANION GAP SERPL CALCULATED.3IONS-SCNC: 10 MEQ/L (ref 8–16)
AST SERPL-CCNC: 22 U/L (ref 5–40)
AVERAGE GLUCOSE: 174 MG/DL (ref 70–126)
BILIRUB SERPL-MCNC: 1.2 MG/DL (ref 0.3–1.2)
BUN BLDV-MCNC: 6 MG/DL (ref 7–22)
CALCIUM SERPL-MCNC: 8.6 MG/DL (ref 8.5–10.5)
CHLORIDE BLD-SCNC: 104 MEQ/L (ref 98–111)
CO2: 28 MEQ/L (ref 23–33)
CREAT SERPL-MCNC: 0.8 MG/DL (ref 0.4–1.2)
EKG ATRIAL RATE: 77 BPM
EKG ATRIAL RATE: 77 BPM
EKG P AXIS: 51 DEGREES
EKG P AXIS: 59 DEGREES
EKG P-R INTERVAL: 176 MS
EKG P-R INTERVAL: 188 MS
EKG Q-T INTERVAL: 384 MS
EKG Q-T INTERVAL: 386 MS
EKG QRS DURATION: 80 MS
EKG QRS DURATION: 90 MS
EKG QTC CALCULATION (BAZETT): 434 MS
EKG QTC CALCULATION (BAZETT): 436 MS
EKG R AXIS: 42 DEGREES
EKG R AXIS: 64 DEGREES
EKG T AXIS: 30 DEGREES
EKG T AXIS: 38 DEGREES
EKG VENTRICULAR RATE: 77 BPM
EKG VENTRICULAR RATE: 77 BPM
GFR SERPL CREATININE-BSD FRML MDRD: > 90 ML/MIN/1.73M2
GLUCOSE BLD-MCNC: 204 MG/DL (ref 70–108)
GLUCOSE BLD-MCNC: 235 MG/DL (ref 70–108)
GLUCOSE BLD-MCNC: 240 MG/DL (ref 70–108)
GLUCOSE BLD-MCNC: 258 MG/DL (ref 70–108)
HBA1C MFR BLD: 7.8 % (ref 4.4–6.4)
HCT VFR BLD CALC: 42.3 % (ref 42–52)
HEMOGLOBIN: 15 GM/DL (ref 14–18)
MCH RBC QN AUTO: 31.5 PG (ref 27–31)
MCHC RBC AUTO-ENTMCNC: 35.4 GM/DL (ref 33–37)
MCV RBC AUTO: 89.1 FL (ref 80–94)
PDW BLD-RTO: 13.2 % (ref 11.5–14.5)
PLATELET # BLD: 164 THOU/MM3 (ref 130–400)
PMV BLD AUTO: 8.8 FL (ref 7.4–10.4)
POTASSIUM REFLEX MAGNESIUM: 3.9 MEQ/L (ref 3.5–5.2)
RBC # BLD: 4.74 MILL/MM3 (ref 4.7–6.1)
SODIUM BLD-SCNC: 142 MEQ/L (ref 135–145)
TOTAL PROTEIN: 6.3 G/DL (ref 6.1–8)
TROPONIN T: < 0.01 NG/ML
WBC # BLD: 6.6 THOU/MM3 (ref 4.8–10.8)

## 2018-04-15 PROCEDURE — 3430000000 HC RX DIAGNOSTIC RADIOPHARMACEUTICAL: Performed by: INTERNAL MEDICINE

## 2018-04-15 PROCEDURE — 93005 ELECTROCARDIOGRAM TRACING: CPT | Performed by: INTERNAL MEDICINE

## 2018-04-15 PROCEDURE — 85027 COMPLETE CBC AUTOMATED: CPT

## 2018-04-15 PROCEDURE — 99215 OFFICE O/P EST HI 40 MIN: CPT | Performed by: INTERNAL MEDICINE

## 2018-04-15 PROCEDURE — G0378 HOSPITAL OBSERVATION PER HR: HCPCS

## 2018-04-15 PROCEDURE — 93010 ELECTROCARDIOGRAM REPORT: CPT | Performed by: NUCLEAR MEDICINE

## 2018-04-15 PROCEDURE — 6360000002 HC RX W HCPCS

## 2018-04-15 PROCEDURE — 6360000002 HC RX W HCPCS: Performed by: INTERNAL MEDICINE

## 2018-04-15 PROCEDURE — 96376 TX/PRO/DX INJ SAME DRUG ADON: CPT

## 2018-04-15 PROCEDURE — 84484 ASSAY OF TROPONIN QUANT: CPT

## 2018-04-15 PROCEDURE — 82948 REAGENT STRIP/BLOOD GLUCOSE: CPT

## 2018-04-15 PROCEDURE — 93017 CV STRESS TEST TRACING ONLY: CPT | Performed by: INTERNAL MEDICINE

## 2018-04-15 PROCEDURE — 36415 COLL VENOUS BLD VENIPUNCTURE: CPT

## 2018-04-15 PROCEDURE — 83036 HEMOGLOBIN GLYCOSYLATED A1C: CPT

## 2018-04-15 PROCEDURE — 80053 COMPREHEN METABOLIC PANEL: CPT

## 2018-04-15 PROCEDURE — 78452 HT MUSCLE IMAGE SPECT MULT: CPT

## 2018-04-15 PROCEDURE — 6370000000 HC RX 637 (ALT 250 FOR IP): Performed by: INTERNAL MEDICINE

## 2018-04-15 PROCEDURE — 2580000003 HC RX 258: Performed by: INTERNAL MEDICINE

## 2018-04-15 PROCEDURE — A9500 TC99M SESTAMIBI: HCPCS | Performed by: INTERNAL MEDICINE

## 2018-04-15 RX ORDER — NICOTINE POLACRILEX 4 MG
15 LOZENGE BUCCAL PRN
Status: DISCONTINUED | OUTPATIENT
Start: 2018-04-15 | End: 2018-04-15

## 2018-04-15 RX ORDER — MORPHINE SULFATE 2 MG/ML
2 INJECTION, SOLUTION INTRAMUSCULAR; INTRAVENOUS ONCE
Status: COMPLETED | OUTPATIENT
Start: 2018-04-15 | End: 2018-04-15

## 2018-04-15 RX ORDER — NICOTINE POLACRILEX 4 MG
15 LOZENGE BUCCAL PRN
Status: DISCONTINUED | OUTPATIENT
Start: 2018-04-15 | End: 2018-04-15 | Stop reason: HOSPADM

## 2018-04-15 RX ORDER — ROPINIROLE 1 MG/1
1 TABLET, FILM COATED ORAL 3 TIMES DAILY
Status: DISCONTINUED | OUTPATIENT
Start: 2018-04-15 | End: 2018-04-15 | Stop reason: HOSPADM

## 2018-04-15 RX ORDER — MORPHINE SULFATE 2 MG/ML
1 INJECTION, SOLUTION INTRAMUSCULAR; INTRAVENOUS ONCE
Status: COMPLETED | OUTPATIENT
Start: 2018-04-15 | End: 2018-04-15

## 2018-04-15 RX ORDER — INSULIN GLARGINE 100 [IU]/ML
70 INJECTION, SOLUTION SUBCUTANEOUS EVERY MORNING
Status: DISCONTINUED | OUTPATIENT
Start: 2018-04-15 | End: 2018-04-15 | Stop reason: HOSPADM

## 2018-04-15 RX ORDER — DEXTROSE MONOHYDRATE 25 G/50ML
12.5 INJECTION, SOLUTION INTRAVENOUS PRN
Status: DISCONTINUED | OUTPATIENT
Start: 2018-04-15 | End: 2018-04-15 | Stop reason: HOSPADM

## 2018-04-15 RX ORDER — INSULIN GLARGINE 100 [IU]/ML
45 INJECTION, SOLUTION SUBCUTANEOUS NIGHTLY
Status: DISCONTINUED | OUTPATIENT
Start: 2018-04-15 | End: 2018-04-15 | Stop reason: HOSPADM

## 2018-04-15 RX ORDER — DEXTROSE MONOHYDRATE 25 G/50ML
12.5 INJECTION, SOLUTION INTRAVENOUS PRN
Status: DISCONTINUED | OUTPATIENT
Start: 2018-04-15 | End: 2018-04-15

## 2018-04-15 RX ORDER — ROPINIROLE 1 MG/1
1 TABLET, FILM COATED ORAL 3 TIMES DAILY
Status: DISCONTINUED | OUTPATIENT
Start: 2018-04-15 | End: 2018-04-15

## 2018-04-15 RX ORDER — MORPHINE SULFATE 2 MG/ML
2 INJECTION, SOLUTION INTRAMUSCULAR; INTRAVENOUS ONCE
Status: DISCONTINUED | OUTPATIENT
Start: 2018-04-15 | End: 2018-04-15

## 2018-04-15 RX ORDER — MORPHINE SULFATE 2 MG/ML
1 INJECTION, SOLUTION INTRAMUSCULAR; INTRAVENOUS ONCE
Status: DISCONTINUED | OUTPATIENT
Start: 2018-04-15 | End: 2018-04-15 | Stop reason: CLARIF

## 2018-04-15 RX ORDER — ZONISAMIDE 100 MG/1
500 CAPSULE ORAL ONCE
Status: COMPLETED | OUTPATIENT
Start: 2018-04-15 | End: 2018-04-15

## 2018-04-15 RX ORDER — DEXTROSE MONOHYDRATE 50 MG/ML
100 INJECTION, SOLUTION INTRAVENOUS PRN
Status: DISCONTINUED | OUTPATIENT
Start: 2018-04-15 | End: 2018-04-15

## 2018-04-15 RX ORDER — DEXTROSE MONOHYDRATE 50 MG/ML
100 INJECTION, SOLUTION INTRAVENOUS PRN
Status: DISCONTINUED | OUTPATIENT
Start: 2018-04-15 | End: 2018-04-15 | Stop reason: HOSPADM

## 2018-04-15 RX ADMIN — DIVALPROEX SODIUM 500 MG: 500 TABLET, DELAYED RELEASE ORAL at 07:50

## 2018-04-15 RX ADMIN — METOPROLOL TARTRATE 25 MG: 25 TABLET ORAL at 01:50

## 2018-04-15 RX ADMIN — OMEGA-3-ACID ETHYL ESTERS 2 G: 1 CAPSULE, LIQUID FILLED ORAL at 01:50

## 2018-04-15 RX ADMIN — Medication 35 MILLICURIE: at 10:15

## 2018-04-15 RX ADMIN — LEVETIRACETAM 2000 MG: 500 TABLET, FILM COATED ORAL at 07:51

## 2018-04-15 RX ADMIN — LACOSAMIDE 250 MG: 200 TABLET, FILM COATED ORAL at 03:19

## 2018-04-15 RX ADMIN — INSULIN GLARGINE 45 UNITS: 100 INJECTION, SOLUTION SUBCUTANEOUS at 03:18

## 2018-04-15 RX ADMIN — MORPHINE SULFATE 2 MG: 2 INJECTION, SOLUTION INTRAMUSCULAR; INTRAVENOUS at 09:08

## 2018-04-15 RX ADMIN — Medication 10 ML: at 07:54

## 2018-04-15 RX ADMIN — METOPROLOL TARTRATE 25 MG: 25 TABLET ORAL at 11:55

## 2018-04-15 RX ADMIN — OMEGA-3-ACID ETHYL ESTERS 2 G: 1 CAPSULE, LIQUID FILLED ORAL at 07:50

## 2018-04-15 RX ADMIN — DIVALPROEX SODIUM 500 MG: 500 TABLET, DELAYED RELEASE ORAL at 01:50

## 2018-04-15 RX ADMIN — Medication 3 UNITS: at 13:27

## 2018-04-15 RX ADMIN — ROPINIROLE HYDROCHLORIDE 1 MG: 1 TABLET, FILM COATED ORAL at 13:31

## 2018-04-15 RX ADMIN — ZONISAMIDE 500 MG: 100 CAPSULE ORAL at 03:20

## 2018-04-15 RX ADMIN — LACOSAMIDE 250 MG: 200 TABLET, FILM COATED ORAL at 07:50

## 2018-04-15 RX ADMIN — INSULIN LISPRO 30 UNITS: 100 INJECTION, SOLUTION INTRAVENOUS; SUBCUTANEOUS at 13:28

## 2018-04-15 RX ADMIN — ONDANSETRON 4 MG: 2 INJECTION INTRAMUSCULAR; INTRAVENOUS at 11:22

## 2018-04-15 RX ADMIN — ROPINIROLE HYDROCHLORIDE 1 MG: 1 TABLET, FILM COATED ORAL at 03:19

## 2018-04-15 RX ADMIN — GABAPENTIN 600 MG: 600 TABLET ORAL at 13:31

## 2018-04-15 RX ADMIN — Medication 10 ML: at 09:08

## 2018-04-15 RX ADMIN — Medication 11 MILLICURIE: at 09:21

## 2018-04-15 RX ADMIN — GABAPENTIN 600 MG: 600 TABLET ORAL at 07:51

## 2018-04-15 RX ADMIN — FENOFIBRATE 160 MG: 160 TABLET ORAL at 07:51

## 2018-04-15 RX ADMIN — VITAMIN D, TAB 1000IU (100/BT) 2000 UNITS: 25 TAB at 07:51

## 2018-04-15 RX ADMIN — PANTOPRAZOLE SODIUM 40 MG: 40 TABLET, DELAYED RELEASE ORAL at 07:50

## 2018-04-15 RX ADMIN — LEVETIRACETAM 2000 MG: 500 TABLET, FILM COATED ORAL at 01:51

## 2018-04-15 RX ADMIN — GABAPENTIN 600 MG: 600 TABLET ORAL at 01:50

## 2018-04-15 RX ADMIN — DULOXETINE HYDROCHLORIDE 60 MG: 60 CAPSULE, DELAYED RELEASE ORAL at 07:51

## 2018-04-15 RX ADMIN — ROPINIROLE HYDROCHLORIDE 1 MG: 1 TABLET, FILM COATED ORAL at 07:51

## 2018-04-15 RX ADMIN — INSULIN GLARGINE 70 UNITS: 100 INJECTION, SOLUTION SUBCUTANEOUS at 13:27

## 2018-04-15 RX ADMIN — MORPHINE SULFATE 1 MG: 2 INJECTION, SOLUTION INTRAMUSCULAR; INTRAVENOUS at 05:42

## 2018-04-15 ASSESSMENT — PAIN SCALES - GENERAL
PAINLEVEL_OUTOF10: 2
PAINLEVEL_OUTOF10: 5
PAINLEVEL_OUTOF10: 1
PAINLEVEL_OUTOF10: 3
PAINLEVEL_OUTOF10: 5

## 2018-04-15 ASSESSMENT — PAIN DESCRIPTION - PAIN TYPE
TYPE: ACUTE PAIN

## 2018-04-15 ASSESSMENT — PAIN DESCRIPTION - LOCATION
LOCATION: CHEST

## 2018-04-15 ASSESSMENT — PAIN DESCRIPTION - ORIENTATION
ORIENTATION: LEFT

## 2018-04-15 ASSESSMENT — PAIN DESCRIPTION - DESCRIPTORS
DESCRIPTORS: PRESSURE;TIGHTNESS
DESCRIPTORS: PRESSURE

## 2018-04-16 ENCOUNTER — TELEPHONE (OUTPATIENT)
Dept: INTERNAL MEDICINE CLINIC | Age: 48
End: 2018-04-16

## 2018-04-16 ENCOUNTER — CARE COORDINATION (OUTPATIENT)
Dept: CASE MANAGEMENT | Age: 48
End: 2018-04-16

## 2018-04-16 DIAGNOSIS — R55 SYNCOPE AND COLLAPSE: Primary | ICD-10-CM

## 2018-04-16 PROCEDURE — 1111F DSCHRG MED/CURRENT MED MERGE: CPT | Performed by: INTERNAL MEDICINE

## 2018-04-19 ENCOUNTER — OFFICE VISIT (OUTPATIENT)
Dept: INTERNAL MEDICINE CLINIC | Age: 48
End: 2018-04-19
Payer: MEDICARE

## 2018-04-19 VITALS
SYSTOLIC BLOOD PRESSURE: 120 MMHG | HEART RATE: 102 BPM | WEIGHT: 268 LBS | DIASTOLIC BLOOD PRESSURE: 80 MMHG | BODY MASS INDEX: 33.32 KG/M2 | HEIGHT: 75 IN

## 2018-04-19 DIAGNOSIS — E11.40 TYPE 2 DIABETES MELLITUS WITH DIABETIC NEUROPATHY, WITH LONG-TERM CURRENT USE OF INSULIN (HCC): ICD-10-CM

## 2018-04-19 DIAGNOSIS — Z79.4 TYPE 2 DIABETES MELLITUS WITH DIABETIC NEUROPATHY, WITH LONG-TERM CURRENT USE OF INSULIN (HCC): ICD-10-CM

## 2018-04-19 DIAGNOSIS — R91.8 GROUND GLASS OPACITY PRESENT ON IMAGING OF LUNG: Primary | ICD-10-CM

## 2018-04-19 DIAGNOSIS — R35.0 URINARY FREQUENCY: ICD-10-CM

## 2018-04-19 DIAGNOSIS — R09.1 PLEURISY: ICD-10-CM

## 2018-04-19 DIAGNOSIS — R00.0 SINUS TACHYCARDIA: ICD-10-CM

## 2018-04-19 LAB — GLUCOSE BLD-MCNC: 72 MG/DL

## 2018-04-19 PROCEDURE — G8427 DOCREV CUR MEDS BY ELIG CLIN: HCPCS | Performed by: INTERNAL MEDICINE

## 2018-04-19 PROCEDURE — 3045F PR MOST RECENT HEMOGLOBIN A1C LEVEL 7.0-9.0%: CPT | Performed by: INTERNAL MEDICINE

## 2018-04-19 PROCEDURE — 82962 GLUCOSE BLOOD TEST: CPT | Performed by: INTERNAL MEDICINE

## 2018-04-19 PROCEDURE — 2022F DILAT RTA XM EVC RTNOPTHY: CPT | Performed by: INTERNAL MEDICINE

## 2018-04-19 PROCEDURE — 99214 OFFICE O/P EST MOD 30 MIN: CPT | Performed by: INTERNAL MEDICINE

## 2018-04-19 PROCEDURE — G8417 CALC BMI ABV UP PARAM F/U: HCPCS | Performed by: INTERNAL MEDICINE

## 2018-04-19 PROCEDURE — 1036F TOBACCO NON-USER: CPT | Performed by: INTERNAL MEDICINE

## 2018-04-19 RX ORDER — METOPROLOL TARTRATE 50 MG/1
50 TABLET, FILM COATED ORAL 2 TIMES DAILY
Qty: 180 TABLET | Refills: 1 | Status: SHIPPED | OUTPATIENT
Start: 2018-04-19 | End: 2019-12-17 | Stop reason: SDUPTHER

## 2018-04-19 RX ORDER — METHYLPREDNISOLONE 4 MG/1
TABLET ORAL
Qty: 1 KIT | Refills: 0 | Status: SHIPPED | OUTPATIENT
Start: 2018-04-19 | End: 2018-04-25

## 2018-04-20 ENCOUNTER — CARE COORDINATION (OUTPATIENT)
Dept: CARE COORDINATION | Age: 48
End: 2018-04-20

## 2018-04-27 ENCOUNTER — CARE COORDINATION (OUTPATIENT)
Dept: CARE COORDINATION | Age: 48
End: 2018-04-27

## 2018-04-27 ENCOUNTER — TELEPHONE (OUTPATIENT)
Dept: INTERNAL MEDICINE CLINIC | Age: 48
End: 2018-04-27

## 2018-05-02 ENCOUNTER — HOSPITAL ENCOUNTER (OUTPATIENT)
Age: 48
Discharge: HOME | End: 2018-05-02
Payer: MEDICARE

## 2018-05-02 ENCOUNTER — HOSPITAL ENCOUNTER (OUTPATIENT)
Dept: CT IMAGING | Age: 48
Discharge: HOME | End: 2018-05-02
Payer: MEDICARE

## 2018-05-02 DIAGNOSIS — R91.8 GROUND GLASS OPACITY PRESENT ON IMAGING OF LUNG: ICD-10-CM

## 2018-05-02 DIAGNOSIS — R35.0 URINARY FREQUENCY: ICD-10-CM

## 2018-05-02 LAB
BACTERIA: ABNORMAL /HPF
BILIRUBIN URINE: NEGATIVE
BLOOD, URINE: NEGATIVE
CASTS 2: ABNORMAL /LPF
CASTS UA: ABNORMAL /LPF
CHARACTER, URINE: CLEAR
COLOR: YELLOW
CRYSTALS, UA: ABNORMAL
EPITHELIAL CELLS, UA: ABNORMAL /HPF
GLUCOSE URINE: >= 1000 MG/DL
KETONES, URINE: ABNORMAL
LEUKOCYTE ESTERASE, URINE: NEGATIVE
MISCELLANEOUS 2: ABNORMAL
NITRITE, URINE: NEGATIVE
PH UA: 5.5
PROTEIN UA: NEGATIVE
RBC URINE: ABNORMAL /HPF
RENAL EPITHELIAL, UA: ABNORMAL
SPECIFIC GRAVITY, URINE: > 1.03 (ref 1–1.03)
UROBILINOGEN, URINE: 0.2 EU/DL
WBC UA: ABNORMAL /HPF
YEAST: ABNORMAL

## 2018-05-02 PROCEDURE — 81001 URINALYSIS AUTO W/SCOPE: CPT

## 2018-05-02 PROCEDURE — 6360000004 HC RX CONTRAST MEDICATION: Performed by: INTERNAL MEDICINE

## 2018-05-02 PROCEDURE — 71260 CT THORAX DX C+: CPT

## 2018-05-02 RX ADMIN — IOPAMIDOL 85 ML: 755 INJECTION, SOLUTION INTRAVENOUS at 10:14

## 2018-05-15 ENCOUNTER — OFFICE VISIT (OUTPATIENT)
Dept: INTERNAL MEDICINE CLINIC | Age: 48
End: 2018-05-15
Payer: MEDICARE

## 2018-05-15 VITALS
SYSTOLIC BLOOD PRESSURE: 122 MMHG | TEMPERATURE: 98.8 F | HEIGHT: 75 IN | WEIGHT: 269 LBS | HEART RATE: 80 BPM | DIASTOLIC BLOOD PRESSURE: 80 MMHG | BODY MASS INDEX: 33.45 KG/M2

## 2018-05-15 DIAGNOSIS — R35.0 URINARY FREQUENCY: ICD-10-CM

## 2018-05-15 DIAGNOSIS — R06.09 DOE (DYSPNEA ON EXERTION): ICD-10-CM

## 2018-05-15 DIAGNOSIS — E11.65 UNCONTROLLED TYPE 2 DIABETES MELLITUS WITH HYPERGLYCEMIA, WITH LONG-TERM CURRENT USE OF INSULIN (HCC): ICD-10-CM

## 2018-05-15 DIAGNOSIS — E55.9 VITAMIN D DEFICIENCY: ICD-10-CM

## 2018-05-15 DIAGNOSIS — J20.9 ACUTE BRONCHITIS, UNSPECIFIED ORGANISM: ICD-10-CM

## 2018-05-15 DIAGNOSIS — R07.89 OTHER CHEST PAIN: Primary | ICD-10-CM

## 2018-05-15 DIAGNOSIS — Z79.4 UNCONTROLLED TYPE 2 DIABETES MELLITUS WITH HYPERGLYCEMIA, WITH LONG-TERM CURRENT USE OF INSULIN (HCC): ICD-10-CM

## 2018-05-15 PROCEDURE — G8427 DOCREV CUR MEDS BY ELIG CLIN: HCPCS | Performed by: INTERNAL MEDICINE

## 2018-05-15 PROCEDURE — G8417 CALC BMI ABV UP PARAM F/U: HCPCS | Performed by: INTERNAL MEDICINE

## 2018-05-15 PROCEDURE — 2022F DILAT RTA XM EVC RTNOPTHY: CPT | Performed by: INTERNAL MEDICINE

## 2018-05-15 PROCEDURE — 3045F PR MOST RECENT HEMOGLOBIN A1C LEVEL 7.0-9.0%: CPT | Performed by: INTERNAL MEDICINE

## 2018-05-15 PROCEDURE — 99214 OFFICE O/P EST MOD 30 MIN: CPT | Performed by: INTERNAL MEDICINE

## 2018-05-15 PROCEDURE — G8598 ASA/ANTIPLAT THER USED: HCPCS | Performed by: INTERNAL MEDICINE

## 2018-05-15 PROCEDURE — 1036F TOBACCO NON-USER: CPT | Performed by: INTERNAL MEDICINE

## 2018-05-15 RX ORDER — CEFUROXIME AXETIL 250 MG/1
250 TABLET ORAL 2 TIMES DAILY
Qty: 20 TABLET | Refills: 0 | Status: SHIPPED | OUTPATIENT
Start: 2018-05-15 | End: 2018-05-25 | Stop reason: ALTCHOICE

## 2018-05-15 NOTE — PROGRESS NOTES
1970    Chief Complaint   Patient presents with    Follow-up     CT scan/Chest pain/SOB    Diabetes    Urinary Frequency    Other     nasal congestion, cough X 2 weeks-green mucous    Other     vitamin D deficiency       Pt is a 50 y.o. male who presents for follow up visit. DM - issues with high and low FSBS. He needs to be more consistent with eating. Increase Levemir to 50 Units at 11 pm.     If you are taking SSI at noon and not eating lunch - sometime you drop low at 5-6pm = suggest at least eating eating peanut butter and crackers in afternoon. He is complaining of episodic chest pain with SOB, diaphoresis, tachycardia that lasts 2-20 minutes up to 12 x a day. When it happens it hurts to take deep breath - but not present when no chest pain. He had an episode during my last visit - no pain to palpation of chest.  Positive diaphoresis, tachycardia, but also at time had a sugar of 72. Given glucose tab and crackers. He was given a stress test in ER which was unremarkable. For the tachycardia - at last visit, increased metoprolol and added prednisone for possible pleurisy and abnormal CT. Chest pain still an issue - set up ECHO and visit with Dr. Lias Christensen and see if he thinks could be due to bridging of LAD. Reviewed CT results - ground glass opacities resolved after steroids. Cough x 2 weeks - Start Ceftin 250 mg BID for cough with green sputum. He has GI appt to discus abdominal pain with eating and is taking pancreatic enzyme the last 1 month - stools and pain not better. To see GI in June. Still with urinary frequency but neg UA except large glucose. He has good amount of urine output with frequency. Encouraged better control of DM. Vitamin D deficiency - on 6000 IU daily and level 20 4/2018. Increase to 4000 IU BID.     Past Medical History:   Diagnosis Date    Acid reflux     Anemia     previously seen by Dr. Falguni Dennis (due to enlarged spleen)    Bipolar disorder ([address]     Blood clot in vein 4/7/16    Deja Leonides     Chest pain     previously seeing [facility] cardiologist, now seeing Dr. Bria Perez (LAD bridging on cath 4/2016)    Chronic back pain     Chronic bronchitis ([address]     Dr. Julio Lozano 10/2012 - no longer following with him    Colon polyp 08/30/2016    Depression     Dr. Wilmar Jacobsen DVT (deep venous thrombosis) ([facility] [address] [id number]    not on Coumadin due to unable to regulate - Dr. Radha Coronel - no etiology found per patient    Esophageal abnormality     nodule     Fatty liver disease, nonalcoholic     U/S 35/6114 [facility]    Furuncle     legs    History of Doppler ultrasound 5-29-11    No hemodynamically significant carotid stenosis is identified. A thyroid nodule on each side. Dedicated ultrasound of thyroid gland is suggested to further evaluate if clinically indicated.      Hx of blood clots     Hyperlipidemia     severely elevated triglycerides    Hypertension     diastolic    Intracranial arachnoid cyst 11/2012    Dr. Chad Tabares drained, complicated with seizures, DKA    Irritable bowel syndrome     Liver disease     Kathryn Embs - elevated LFT - positive smooth muscle antibody, steatosis per liver bx 3/2014 with Dr. Janeth Hein (multiple drug resistant organisms) resistance 2012    MRSA (methicillin resistant staph aureus) culture positive     h/o in foot and before brain surgery    Nephrolithiasis     noted on CT abdomen 9/2016    Neuropathy     Pancreatic insufficiency     Positive SANDY (antinuclear antibody)     Dr. John Colon - first visit in 6/2013    Pulmonary embolism ([address] 2007    s/p GFF, related to knee surgery    Restless legs syndrome     Seizures ([address]     Sinus tachycardia     Holter 3/2013 - seeing Dr. Huseyin Weaver Skull fracture [facility])     clips     Sleep apnea     multiple MD's suggested testing but he refuses to be tested as claustraphobic and won't use Cpap    Type II or unspecified type diabetes mellitus without mention of complication, not stated as uncontrolled 2007       Past Surgical History:   Procedure Laterality Date    CARDIAC CATHETERIZATION      2011,5-6 years ago   [address]  3-2012    CARDIAC CAT[facility]  [facility]28/2016    [facility]     CARPAL TUNNEL RELEASE  01/2017    CHOLECYSTECTOMY  2004    COLONOSCOPY  2012    COLONOSCOPY  08/2016    2 tubular adenomas - reportedly needs repeat scope in 6 months    CRANIOTOMY  11/2012    arachnoid cyst drainage    EKG 12-LEAD  10/18/2015         ENDOSCOPY, COLON, DIAGNOSTIC      HERNIA REPAIR Right 1996    [facility]--Dr. Jenny Arguello INGUINAL HERNIA REPAIR Right 09/15/2016    Robotic assisted    KNEE ARTHROSCOPY Right 2016    KNEE SURGERY Left 2007    acl and debrided twice    OTHER SURGICAL HISTORY  5-31-11    Tilt table was associated w/ nonspecific symptoms or nausea, otherwise no significant dizziness or syncope. No significant hemodynamic changes. Otherwise, unremarkable tilt table test after 30 minutes of tilting.  OTHER SURGICAL HISTORY  01/20/2017    RIGHT SHOULDER ARTHROSCOPY, OPEN STAN, OPEN ACROMIOPLASTY, BICEP TENDESIS, RIGHT CARPAL TUNNEL RELEASE    SHOULDER SURGERY Right 01/2007    TONSILLECTOMY      TRANSTHORACIC ECHOCARDIOGRAM  3-05-11    LV size and systolic function normal. EF 55-65%.      UPPER GASTROINTESTINAL ENDOSCOPY  2012    UPPER GASTROINTESTINAL ENDOSCOPY  2016    Dr. Rojo Feeling  2007       Current Outpatient Prescriptions   Medication Sig Dispense Refill    metoprolol tartrate (LOPRESSOR) 50 MG tablet Take 1 tablet by mouth 2 times daily (Patient taking differently: Take 75 mg by mouth 2 times daily ) 180 tablet 1    DULoxetine (CYMBALTA) 60 MG extended release capsule Take 60 mg by mouth daily       divalproex (DEPAKOTE) 500 MG DR tablet Take 500 mg by mouth 2 times daily       promethazine (PHENERGAN) 12.5 MG tablet Take 1-2 tablets by mouth every 8 hours as needed for Nausea 60 tablet 2    fenofibrate (TRICOR) 145 MG Vitamin D deficiency     5. Uncontrolled type 2 diabetes mellitus with hyperglycemia, with long-term current use of insulin ([facility] [address]     6. Urinary frequency       Chest pain/abnormal CT with ground glass opacities/pleurisy/tachycardia/EWING - unlikely to be due to cardiac ischemia due to normal stress test.  On increased dose metoprolol 50 mg BID and tachycardia is better, ordered CT chest for more complete evaluation after prednisone - ground glass opacities resolved - normal CT except fatty liver. Order ECHO and follow up with Dr. Farzana Marie to see if pain could be from LAD bridging. Acute bronchitis - start Ceftin. Vitamin D deficiency - increase to 4000 IU BID. DM - increase Levamir to 50 Units nightly and eat something with protein and carbs in afternoon to avoid low sugar 5-6 pm.      Urinary frequency - UA negative for infection, suspect due to hyperglycemia- see above plan. Discussed OAB medication but didn't want to start another medication. Follow up visit in 3 months to follow up chronic issues. Troy Back MD    [facility] PROFESSIONAL SERVS  [facility] [address]  [address]  [place] [address]  Dept: [phone]  Dept Fax: 24 : [phone]

## 2018-05-15 NOTE — PATIENT INSTRUCTIONS
Increase Levemir to 50 Units at 11 pm.     If you are taking SSI at noon and not eating lunch - sometime you drop low at 5-6pm = suggest eating peanut butter and crackers. Chest pain - set up ECHO and visit with Dr. Jus Corona and see if he thinks could be due to bridging of LAD. Start Ceftin 250 mg BID for cough with green sputum. Increase vitamin D to 4000 IU twice a day.

## 2018-05-25 ENCOUNTER — OFFICE VISIT (OUTPATIENT)
Dept: CARDIOLOGY CLINIC | Age: 48
End: 2018-05-25
Payer: MEDICARE

## 2018-05-25 VITALS
WEIGHT: 265.1 LBS | HEART RATE: 80 BPM | DIASTOLIC BLOOD PRESSURE: 86 MMHG | SYSTOLIC BLOOD PRESSURE: 132 MMHG | BODY MASS INDEX: 33.14 KG/M2

## 2018-05-25 DIAGNOSIS — R00.0 SINUS TACHYCARDIA: ICD-10-CM

## 2018-05-25 DIAGNOSIS — I25.118 CORONARY ARTERY DISEASE OF NATIVE ARTERY OF NATIVE HEART WITH STABLE ANGINA PECTORIS (HCC): ICD-10-CM

## 2018-05-25 DIAGNOSIS — I10 ESSENTIAL HYPERTENSION: Primary | ICD-10-CM

## 2018-05-25 DIAGNOSIS — E78.01 FAMILIAL HYPERCHOLESTEROLEMIA: ICD-10-CM

## 2018-05-25 DIAGNOSIS — R06.09 DYSPNEA ON EXERTION: ICD-10-CM

## 2018-05-25 PROCEDURE — 1036F TOBACCO NON-USER: CPT | Performed by: NUCLEAR MEDICINE

## 2018-05-25 PROCEDURE — G8427 DOCREV CUR MEDS BY ELIG CLIN: HCPCS | Performed by: NUCLEAR MEDICINE

## 2018-05-25 PROCEDURE — 99214 OFFICE O/P EST MOD 30 MIN: CPT | Performed by: NUCLEAR MEDICINE

## 2018-05-25 PROCEDURE — G8417 CALC BMI ABV UP PARAM F/U: HCPCS | Performed by: NUCLEAR MEDICINE

## 2018-05-25 PROCEDURE — G8598 ASA/ANTIPLAT THER USED: HCPCS | Performed by: NUCLEAR MEDICINE

## 2018-06-05 ENCOUNTER — APPOINTMENT (OUTPATIENT)
Dept: CT IMAGING | Age: 48
End: 2018-06-05
Payer: MEDICARE

## 2018-06-05 ENCOUNTER — APPOINTMENT (OUTPATIENT)
Dept: GENERAL RADIOLOGY | Age: 48
End: 2018-06-05
Payer: MEDICARE

## 2018-06-05 ENCOUNTER — TELEPHONE (OUTPATIENT)
Dept: INTERNAL MEDICINE CLINIC | Age: 48
End: 2018-06-05

## 2018-06-05 ENCOUNTER — HOSPITAL ENCOUNTER (EMERGENCY)
Age: 48
Discharge: HOME | End: 2018-06-05
Attending: INTERNAL MEDICINE
Payer: MEDICARE

## 2018-06-05 VITALS
WEIGHT: 265 LBS | DIASTOLIC BLOOD PRESSURE: 97 MMHG | BODY MASS INDEX: 32.95 KG/M2 | HEART RATE: 76 BPM | TEMPERATURE: 98.2 F | HEIGHT: 75 IN | OXYGEN SATURATION: 94 % | RESPIRATION RATE: 18 BRPM | SYSTOLIC BLOOD PRESSURE: 131 MMHG

## 2018-06-05 DIAGNOSIS — H53.8 BLURRED VISION: ICD-10-CM

## 2018-06-05 DIAGNOSIS — R56.9 SEIZURE (HCC): Primary | ICD-10-CM

## 2018-06-05 DIAGNOSIS — E87.6 HYPOKALEMIA: ICD-10-CM

## 2018-06-05 LAB
ALBUMIN SERPL-MCNC: 4.7 G/DL (ref 3.5–5.1)
ALP BLD-CCNC: 102 U/L (ref 38–126)
ALT SERPL-CCNC: 33 U/L (ref 11–66)
AMPHETAMINE+METHAMPHETAMINE URINE SCREEN: NEGATIVE
ANION GAP SERPL CALCULATED.3IONS-SCNC: 16 MEQ/L (ref 8–16)
AST SERPL-CCNC: 24 U/L (ref 5–40)
BACTERIA: ABNORMAL /HPF
BARBITURATE QUANTITATIVE URINE: NEGATIVE
BASOPHILS # BLD: 0.2 %
BASOPHILS ABSOLUTE: 0 THOU/MM3 (ref 0–0.1)
BENZODIAZEPINE QUANTITATIVE URINE: NEGATIVE
BILIRUB SERPL-MCNC: 2 MG/DL (ref 0.3–1.2)
BILIRUBIN DIRECT: 0.4 MG/DL (ref 0–0.3)
BILIRUBIN URINE: NEGATIVE
BLOOD, URINE: NEGATIVE
BUN BLDV-MCNC: 6 MG/DL (ref 7–22)
CALCIUM SERPL-MCNC: 9 MG/DL (ref 8.5–10.5)
CANNABINOID QUANTITATIVE URINE: NEGATIVE
CARBON MONOXIDE, BLOOD: 6.2 % CO SAT
CASTS 2: ABNORMAL /LPF
CASTS UA: ABNORMAL /LPF
CHARACTER, URINE: CLEAR
CHLORIDE BLD-SCNC: 97 MEQ/L (ref 98–111)
CO2: 27 MEQ/L (ref 23–33)
COCAINE METABOLITE QUANTITATIVE URINE: NEGATIVE
COLOR: YELLOW
CREAT SERPL-MCNC: 0.5 MG/DL (ref 0.4–1.2)
CRYSTALS, UA: ABNORMAL
EKG ATRIAL RATE: 100 BPM
EKG P AXIS: 28 DEGREES
EKG P-R INTERVAL: 162 MS
EKG Q-T INTERVAL: 348 MS
EKG QRS DURATION: 80 MS
EKG QTC CALCULATION (BAZETT): 448 MS
EKG R AXIS: 15 DEGREES
EKG T AXIS: 2 DEGREES
EKG VENTRICULAR RATE: 100 BPM
EOSINOPHIL # BLD: 1.5 %
EOSINOPHILS ABSOLUTE: 0.1 THOU/MM3 (ref 0–0.4)
EPITHELIAL CELLS, UA: ABNORMAL /HPF
ETHYL ALCOHOL, SERUM: < 0.01 %
GFR SERPL CREATININE-BSD FRML MDRD: > 90 ML/MIN/1.73M2
GLUCOSE BLD-MCNC: 243 MG/DL (ref 70–108)
GLUCOSE URINE: >= 1000 MG/DL
HCT VFR BLD CALC: 50.1 % (ref 42–52)
HEMOGLOBIN: 17.8 GM/DL (ref 14–18)
INR BLD: 1.05 (ref 0.85–1.13)
KETONES, URINE: ABNORMAL
LACTIC ACID: 2 MMOL/L (ref 0.5–2.2)
LEUKOCYTE ESTERASE, URINE: NEGATIVE
LYMPHOCYTES # BLD: 25.7 %
LYMPHOCYTES ABSOLUTE: 1.9 THOU/MM3 (ref 1–4.8)
MCH RBC QN AUTO: 31.3 PG (ref 27–31)
MCHC RBC AUTO-ENTMCNC: 35.5 GM/DL (ref 33–37)
MCV RBC AUTO: 88.3 FL (ref 80–94)
MISCELLANEOUS 2: ABNORMAL
MONOCYTES # BLD: 4.9 %
MONOCYTES ABSOLUTE: 0.4 THOU/MM3 (ref 0.4–1.3)
NITRITE, URINE: NEGATIVE
NUCLEATED RED BLOOD CELLS: 0 /100 WBC
OPIATES, URINE: NEGATIVE
OSMOLALITY CALCULATION: 285 MOSMOL/KG (ref 275–300)
OXYCODONE: NEGATIVE
PDW BLD-RTO: 13.5 % (ref 11.5–14.5)
PH UA: 7
PHENCYCLIDINE QUANTITATIVE URINE: NEGATIVE
PLATELET # BLD: 213 THOU/MM3 (ref 130–400)
PMV BLD AUTO: 8.7 FL (ref 7.4–10.4)
POTASSIUM SERPL-SCNC: 3.3 MEQ/L (ref 3.5–5.2)
PROTEIN UA: NEGATIVE
RBC # BLD: 5.67 MILL/MM3 (ref 4.7–6.1)
RBC URINE: ABNORMAL /HPF
RENAL EPITHELIAL, UA: ABNORMAL
SEG NEUTROPHILS: 67.7 %
SEGMENTED NEUTROPHILS ABSOLUTE COUNT: 4.9 THOU/MM3 (ref 1.8–7.7)
SODIUM BLD-SCNC: 140 MEQ/L (ref 135–145)
SPECIFIC GRAVITY, URINE: > 1.03 (ref 1–1.03)
TOTAL CK: 114 U/L (ref 55–170)
TOTAL PROTEIN: 7.7 G/DL (ref 6.1–8)
TROPONIN T: < 0.01 NG/ML
UROBILINOGEN, URINE: 0.2 EU/DL
VALPROIC ACID LEVEL: < 2.8 UG/ML (ref 50–100)
WBC # BLD: 7.2 THOU/MM3 (ref 4.8–10.8)
WBC UA: ABNORMAL /HPF
YEAST: ABNORMAL

## 2018-06-05 PROCEDURE — 6370000000 HC RX 637 (ALT 250 FOR IP): Performed by: INTERNAL MEDICINE

## 2018-06-05 PROCEDURE — 84484 ASSAY OF TROPONIN QUANT: CPT

## 2018-06-05 PROCEDURE — 82248 BILIRUBIN DIRECT: CPT

## 2018-06-05 PROCEDURE — G0480 DRUG TEST DEF 1-7 CLASSES: HCPCS

## 2018-06-05 PROCEDURE — 85025 COMPLETE CBC W/AUTO DIFF WBC: CPT

## 2018-06-05 PROCEDURE — 71046 X-RAY EXAM CHEST 2 VIEWS: CPT

## 2018-06-05 PROCEDURE — 82550 ASSAY OF CK (CPK): CPT

## 2018-06-05 PROCEDURE — 70450 CT HEAD/BRAIN W/O DYE: CPT

## 2018-06-05 PROCEDURE — 80177 DRUG SCRN QUAN LEVETIRACETAM: CPT

## 2018-06-05 PROCEDURE — 81001 URINALYSIS AUTO W/SCOPE: CPT

## 2018-06-05 PROCEDURE — 85610 PROTHROMBIN TIME: CPT

## 2018-06-05 PROCEDURE — 99285 EMERGENCY DEPT VISIT HI MDM: CPT

## 2018-06-05 PROCEDURE — 36415 COLL VENOUS BLD VENIPUNCTURE: CPT

## 2018-06-05 PROCEDURE — 80053 COMPREHEN METABOLIC PANEL: CPT

## 2018-06-05 PROCEDURE — 80307 DRUG TEST PRSMV CHEM ANLYZR: CPT

## 2018-06-05 PROCEDURE — 80164 ASSAY DIPROPYLACETIC ACD TOT: CPT

## 2018-06-05 PROCEDURE — 93005 ELECTROCARDIOGRAM TRACING: CPT | Performed by: INTERNAL MEDICINE

## 2018-06-05 PROCEDURE — 83605 ASSAY OF LACTIC ACID: CPT

## 2018-06-05 PROCEDURE — 82375 ASSAY CARBOXYHB QUANT: CPT

## 2018-06-05 RX ORDER — POTASSIUM CHLORIDE 20 MEQ/1
20 TABLET, EXTENDED RELEASE ORAL 2 TIMES DAILY WITH MEALS
Status: DISCONTINUED | OUTPATIENT
Start: 2018-06-05 | End: 2018-06-05

## 2018-06-05 RX ORDER — POTASSIUM CHLORIDE 20 MEQ/1
20 TABLET, EXTENDED RELEASE ORAL ONCE
Status: COMPLETED | OUTPATIENT
Start: 2018-06-05 | End: 2018-06-05

## 2018-06-05 RX ADMIN — POTASSIUM CHLORIDE 20 MEQ: 20 TABLET, EXTENDED RELEASE ORAL at 14:18

## 2018-06-05 ASSESSMENT — ENCOUNTER SYMPTOMS
NAUSEA: 0
SHORTNESS OF BREATH: 0
BACK PAIN: 0
EYE REDNESS: 0
WHEEZING: 0
COUGH: 0
SORE THROAT: 0
EYE DISCHARGE: 0
RHINORRHEA: 0
DIARRHEA: 0
ABDOMINAL PAIN: 0
VOMITING: 0

## 2018-06-05 ASSESSMENT — PAIN DESCRIPTION - LOCATION: LOCATION: CHEST

## 2018-06-05 ASSESSMENT — PAIN DESCRIPTION - PAIN TYPE: TYPE: CHRONIC PAIN

## 2018-06-05 ASSESSMENT — VISUAL ACUITY
OU: 20/100
OS: 20/100

## 2018-06-05 ASSESSMENT — PAIN DESCRIPTION - FREQUENCY: FREQUENCY: INTERMITTENT

## 2018-06-05 ASSESSMENT — PAIN SCALES - GENERAL: PAINLEVEL_OUTOF10: 4

## 2018-06-05 ASSESSMENT — PAIN DESCRIPTION - ORIENTATION: ORIENTATION: MID

## 2018-06-05 ASSESSMENT — PAIN DESCRIPTION - DESCRIPTORS: DESCRIPTORS: SHARP

## 2018-06-06 DIAGNOSIS — E11.59 HYPERTENSION ASSOCIATED WITH DIABETES (HCC): Primary | ICD-10-CM

## 2018-06-06 DIAGNOSIS — G25.81 RESTLESS LEGS SYNDROME: ICD-10-CM

## 2018-06-06 DIAGNOSIS — K76.9 LIVER DISEASE: ICD-10-CM

## 2018-06-06 DIAGNOSIS — I15.2 HYPERTENSION ASSOCIATED WITH DIABETES (HCC): Primary | ICD-10-CM

## 2018-06-06 LAB — KEPPRA: < 2 UG/ML (ref 12–46)

## 2018-06-18 ENCOUNTER — HOSPITAL ENCOUNTER (OUTPATIENT)
Age: 48
Discharge: HOME | End: 2018-06-18
Payer: MEDICARE

## 2018-06-18 ENCOUNTER — HOSPITAL ENCOUNTER (OUTPATIENT)
Dept: PULMONOLOGY | Age: 48
Discharge: HOME | End: 2018-06-18
Payer: MEDICARE

## 2018-06-18 DIAGNOSIS — E78.01 FAMILIAL HYPERCHOLESTEROLEMIA: ICD-10-CM

## 2018-06-18 DIAGNOSIS — I10 ESSENTIAL HYPERTENSION: ICD-10-CM

## 2018-06-18 DIAGNOSIS — I25.118 CORONARY ARTERY DISEASE OF NATIVE ARTERY OF NATIVE HEART WITH STABLE ANGINA PECTORIS (HCC): ICD-10-CM

## 2018-06-18 DIAGNOSIS — R06.09 DYSPNEA ON EXERTION: ICD-10-CM

## 2018-06-18 LAB — POTASSIUM SERPL-SCNC: 3.5 MEQ/L (ref 3.5–5.2)

## 2018-06-18 PROCEDURE — 94726 PLETHYSMOGRAPHY LUNG VOLUMES: CPT

## 2018-06-18 PROCEDURE — 94729 DIFFUSING CAPACITY: CPT

## 2018-06-18 PROCEDURE — 94060 EVALUATION OF WHEEZING: CPT

## 2018-06-18 PROCEDURE — 84132 ASSAY OF SERUM POTASSIUM: CPT

## 2018-06-18 PROCEDURE — 36415 COLL VENOUS BLD VENIPUNCTURE: CPT

## 2018-06-20 ENCOUNTER — TELEPHONE (OUTPATIENT)
Dept: INTERNAL MEDICINE CLINIC | Age: 48
End: 2018-06-20

## 2018-07-02 ENCOUNTER — OFFICE VISIT (OUTPATIENT)
Dept: PULMONOLOGY | Age: 48
End: 2018-07-02
Payer: MEDICARE

## 2018-07-02 VITALS
OXYGEN SATURATION: 97 % | HEART RATE: 83 BPM | TEMPERATURE: 98.3 F | DIASTOLIC BLOOD PRESSURE: 74 MMHG | RESPIRATION RATE: 17 BRPM | BODY MASS INDEX: 31.41 KG/M2 | WEIGHT: 252.6 LBS | HEIGHT: 75 IN | SYSTOLIC BLOOD PRESSURE: 102 MMHG

## 2018-07-02 DIAGNOSIS — G47.30 SLEEP APNEA, UNSPECIFIED TYPE: ICD-10-CM

## 2018-07-02 DIAGNOSIS — R06.00 DYSPNEA, UNSPECIFIED TYPE: Primary | ICD-10-CM

## 2018-07-02 DIAGNOSIS — R94.2 ABNORMAL FLOW VOLUME LOOP: ICD-10-CM

## 2018-07-02 PROCEDURE — 1036F TOBACCO NON-USER: CPT | Performed by: INTERNAL MEDICINE

## 2018-07-02 PROCEDURE — G8427 DOCREV CUR MEDS BY ELIG CLIN: HCPCS | Performed by: INTERNAL MEDICINE

## 2018-07-02 PROCEDURE — G8598 ASA/ANTIPLAT THER USED: HCPCS | Performed by: INTERNAL MEDICINE

## 2018-07-02 PROCEDURE — 99204 OFFICE O/P NEW MOD 45 MIN: CPT | Performed by: INTERNAL MEDICINE

## 2018-07-02 PROCEDURE — G8417 CALC BMI ABV UP PARAM F/U: HCPCS | Performed by: INTERNAL MEDICINE

## 2018-07-02 NOTE — PROGRESS NOTES
Neck Circumference - 18.50    Mallampati -     Lung Nodule Screening     [] Qualifies    [x] Does not qualify   [] Declined    [] Completed

## 2018-07-02 NOTE — PROGRESS NOTES
Restless leg syndrome:No  History of Seizures: Yes  Sleep Walking:No  Sleep Talking:No  Sleep paralysis: No  Cataplexy: No      [place] Sleepiness Score:   Sitting and readin  Watching TV:1  Sitting inactive in a public place:3  Being a passenger in a motor vehicle for an hour or more:0  Lying down in the afternoon:0  Sitting and talking to someone:0  Sitting quietly after lunch (no alcohol):0  Stopped for a few minutes in traffic while drivin  Total Score:4        Patient considerations: Wheelchair, Walker, Jearl Lauren, Hearing deficit, Claustrophobic, MDD, Blind, Para/Quadraplegic,       Social History: Occupation: He is currently on disability. Patient denies history of tobacco smoking. 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 Rakan Matos He denies history of exposure to coal, quarry, asbestos or significant farm dust exposure. 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 Patient denies any recent travel. Patient denies history of exposure to birds, pigeons, or chickens in the past. The patient admits to to pet animals at home. He had 2 dogs at this time. He denies to history of recreational or IV drug use. Headmits to history of pulmonary embolism in  along with DVT in the past.    Review of Systems:   General/Constitutional: He lost ~40lbs of weight in the last 4months with normal appetite. No fever or chills. HENT: Negative. Eyes: Negative. Upper respiratory tract: No nasal stuffiness or post nasal drip. Lower respiratory tract/ lungs: See HPI. No hemoptysis. Exertional dyspnea. Cardiovascular: No palpitations or chest pain. Gastrointestinal: Constant nausea and vomiting. Neurological: No focal neurologiacal weakness. Extremities: No edema. Musculoskeletal: No complaints. Genitourinary: No complaints. Hematological: Negative. Psychiatric/Behavioral: Negative. Skin: No itching.       Current Medications: Past Medical History:   Diagnosis Date    Acid reflux     Anemia     previously seen by Dr. Ben Crouch (due to enlarged spleen)    Bipolar disorder ([address]     Blood clot in vein 4/7/16    Kaya Maldonado     Chest pain     previously seeing [facility] cardiologist, now seeing Dr. Miranda Hutchison (LAD bridging on cath 4/2016)    Chronic back pain     Chronic bronchitis ([address]     Dr. George Valdovinos 10/2012 - no longer following with him    Colon polyp 08/30/2016    Depression     Dr. Bill Guardado DVT (deep venous thrombosis) ([facility] [address] [id number]    not on Coumadin due to unable to regulate - Dr. Graham Edwards - no etiology found per patient    Esophageal abnormality     nodule     Fatty liver disease, nonalcoholic     U/S 36/0766 [facility]    [facility]     [facility]    History of Doppler ultrasound 5-29-11    No hemodynamically significant carotid stenosis is identified. A thyroid nodule on each side. Dedicated ultrasound of thyroid gland is suggested to further evaluate if clinically indicated.      Hx of blood clots     Hyperlipidemia     severely elevated triglycerides    Hypertension     diastolic    Intracranial arachnoid cyst 11/2012    Dr. Chasity Torres drained, complicated with seizures, DKA    Irritable bowel syndrome     Liver disease     Chad Jewel - elevated LFT - positive smooth muscle antibody, steatosis per liver bx 3/2014 with Dr. Suman Corea (multiple drug resistant organisms) resistance 2012    MRSA (methicillin resistant staph aureus) culture positive     h/o in foot and before brain surgery    Nephrolithiasis     noted on CT abdomen 9/2016    Neuropathy ([address]     Pancreatic insufficiency     Positive SANDY (antinuclear antibody)     Dr. Praful Valdes - first visit in 6/2013    Pulmonary embolism [facility]) 2007    s/p GFF, related to knee surgery    Restless legs syndrome     Seizures ([address]     Sinus tachycardia     Holter 3/2013 - seeing Dr. Malgorzata Vergara Skull fracture [facility])     clips     Sleep apnea     multiple MD's suggested testing but he refuses to be tested as claustraphobic and won't use Cpap    Type II or unspecified type diabetes mellitus without mention of complication, not stated as uncontrolled 2007       Past Surgical History:   Procedure Laterality Date    CARDIAC CATHETERIZATION      2011,5-6 years ago   [address]  3-2012   [address]  04/28/2016    [address]     CARPAL TUNNEL RELEASE  01/2017    CHOLECYSTECTOMY  2004    COLONOSCOPY  2012    COLONOSCOPY  08/2016    2 tubular adenomas - reportedly needs repeat scope in 6 months    CRANIOTOMY  11/2012    arachnoid cyst drainage    EKG 12-LEAD  10/18/2015         ENDOSCOPY, COLON, DIAGNOSTIC      HERNIA REPAIR Right 1996    [facility]--Dr. Mela Villanueva INGUINAL HERNIA REPAIR Right 09/15/2016    Robotic assisted    KNEE ARTHROSCOPY Right 2016    KNEE SURGERY Left 2007    acl and debrided twice    OTHER SURGICAL HISTORY  5-31-11    Tilt table was associated w/ nonspecific symptoms or nausea, otherwise no significant dizziness or syncope. No significant hemodynamic changes. Otherwise, unremarkable tilt table test after 30 minutes of tilting.  OTHER SURGICAL HISTORY  01/20/2017    RIGHT SHOULDER ARTHROSCOPY, OPEN STAN, OPEN ACROMIOPLASTY, BICEP TENDESIS, RIGHT CARPAL TUNNEL RELEASE    SHOULDER SURGERY Right 01/2007    TONSILLECTOMY      TRANSTHORACIC ECHOCARDIOGRAM  3-05-11    LV size and systolic function normal. EF 55-65%.      UPPER GASTROINTESTINAL ENDOSCOPY  2012    UPPER GASTROINTESTINAL ENDOSCOPY  2016    Dr. Abi Kasper  2007       Allergies   Allergen Reactions    Ciprofloxacin-Ciproflox Hcl Er Other (See Comments)     Elevated creatinine    Heparin      Monitor for thrombocytopenia    Metformin Nausea Only     Abdominal pain, diarrhea    Niacin And Related Other (See Comments)     Burning sensation, severe flushing    Tramadol Hcl      Contraindication to seizure medications    Ultram [Tramadol] Contraindication to seizure medications    Warfarin      Very difficult to regulate in past       Current Outpatient Prescriptions   Medication Sig Dispense Refill    Pancrelipase, Lip-Prot-Amyl, (CREON) 58073 units CPEP Take by mouth      metoprolol tartrate (LOPRESSOR) 50 MG tablet Take 1 tablet by mouth 2 times daily (Patient taking differently: Take 75 mg by mouth 2 times daily ) 180 tablet 1    DULoxetine (CYMBALTA) 60 MG extended release capsule Take 60 mg by mouth daily       divalproex (DEPAKOTE) 500 MG DR tablet Take 500 mg by mouth 2 times daily       promethazine (PHENERGAN) 12.5 MG tablet Take 1-2 tablets by mouth every 8 hours as needed for Nausea 60 tablet 2    fenofibrate (TRICOR) 145 MG tablet Take 1 tablet by mouth daily 90 tablet 1    pantoprazole (PROTONIX) 40 MG tablet Take 1 tablet by mouth 2 times daily 180 tablet 1    rOPINIRole (REQUIP) 1 MG tablet TAKE 1 TABLET BY MOUTH THREE TIMES A  tablet 1    L-methylfolate-B6-B12 (METANX) 2-86.687-1-15 MG CAPS capsule Take 1 capsule by mouth daily 60 capsule 3    gabapentin (NEURONTIN) 600 MG tablet Take 1 tablet by mouth 4 times daily 360 tablet 1    glucose blood VI test strips (ONE TOUCH TEST STRIPS) strip Use to test blood glucose level 4 times per day.  Diagnosis: E11.40 150 each 5    Cholecalciferol (VITAMIN D) 2000 units CAPS capsule Take 2,000 Units by mouth 2 times daily  30 capsule 0    insulin aspart (NOVOLOG FLEXPEN) 100 UNIT/ML injection pen Inject 30 Units into the skin 3 times daily (before meals) Plus sliding scale 4:50 > 150 mg/dl (Patient taking differently: Inject 35 Units into the skin 3 times daily (before meals) Plus sliding scale 4:50 > 150 mg/dl) 10 Pen 5    insulin detemir (LEVEMIR FLEXTOUCH) 100 UNIT/ML injection pen 75 units every AM and 57 units every PM (Patient taking differently: 70 units every AM and 45 units every PM) 7 Pen 5    canagliflozin (INVOKANA) 300 MG TABS tablet TAKE 1 TABLET BY MOUTH IN No oral thrush. Eyes: Conjunctivae are normal. Pupils are equal, round, and reactive to light. No scleral icterus. Neck: Neck supple. No JVD present. No tracheal deviation present. Cardiovascular: Normal rate, regular rhythm, normal heart sounds. No murmur heard. Pulmonary/Chest: Effort normal and breath sounds normal. No stridor. No respiratory distress. No wheezes. No rales. Patient exhibits no tenderness. Abdominal: Soft. Patient exhibits no distension. No tenderness. Musculoskeletal: Normal range of motion. Extremities: Patient exhibits no edema and no tenderness. Lymphadenopathy:  No cervical adenopathy. Neurological: Patient is alert and oriented to person, place, and time. Skin: Skin is warm and dry. Patient is not diaphoretic. Psychiatric: Patient  has a normal mood and affect. Patient behavior is normal.     Neck Circumference - 18.50    Mallampati -III          Diagnostic Data:    Radiological Data:  Chest Xray:  Jun 5, 2018  PROCEDURE: XR CHEST (2 VW)  Stable radiographic appearance of the chest, no acute findings. CTA of chest:  4/14/2018 17:47  PROCEDURE: CTA CHEST W WO CONTRAST  Suboptimal contrast opacification no central or proximal branch emboli are identified. Groundglass opacities detailed above differential given above        CT chest with contrast:  May 2, 2018  PROCEDURE: CT CHEST W CONTRAST  1. No pulmonary infiltrates. The lungs are well-inflated. 2. Fatty infiltration of the liver. Pulmonary function tests: 6/18/18                    Echocardiogram:  Accession [id number] Date of Study 04/06/2016  Right Ventricle   The right ventricular size was normal with normal systolic function and   wall thickness. Pulmonic Valve   The pulmonic valve leaflets exhibited normal thickness, no calcification,   and normal cuspal separation. DOPPLER: The transpulmonic velocity was   within the normal range with no evidence for regurgitation.      Conclusions      Summary Ejection fraction is visually estimated at 60%. Systolic function was normal.      Signature      ----------------------------------------------------------------   Electronically signed by Patrick Peterson MD (Interpreting   physician) on 04/07/2016 at 08:48 AM   ----------------------------------------------------------------    Stress test:  Isotope dose:11 mCi                 Isotope dose:35 mCi   Date:04/15/2018 09:21               Date:04/15/2018 10:15  Conclusions      Summary   Lexiscan EKG stress test is not suggestive for ischemia. There is mild attenuation artifact noted in the inferior wall seems to be   related to bowel and diaphragm artifact. The nuclear images is not suggestive for myocardial ischemia. Signatures      ----------------------------------------------------------------   Electronically signed by Amaris Lockwood MD (Interpreting   Cardiologist) on 04/15/2018 at 12:08      Assessment:  -Exertional dyspnea due to ? Etiology. Differential includes Pulmonary Vs Cardiac. He gives a hx of associated left side chest pain. He is following with Dr. João Naqvi  -Flattening of inspiratory limb of flow volume loop- Need to evaluate for variable extrathoracic obstruction.  -Bipolar disorder (HCC)  -Depression.  -Hx of seizures- He is currently on anti seizure meds.  -Hx of pulmonary embolism and DVT (deep venous thrombosis) ([facility])-not on Coumadin due to unable to regulate - Dr. Hudson - no etiology found per patient. He was evaluated at [facility] in the past  -Hypertension on meds- under control  -Type II or unspecified type diabetes mellitus without mention of complication, not stated as uncontrolled. - Hx of Intracranial arachnoid cyst.  -Hx of myocardial bridge. Recommendations/Plan:  -Schedule patient for Methacholine challenge test before clinic visit  to further evaluate for hyperreactive air way disease before clinic visit.   - Schedule patient for pulmonary function tests with [facility] ( [facility]

## 2018-07-23 ENCOUNTER — HOSPITAL ENCOUNTER (OUTPATIENT)
Dept: CT IMAGING | Age: 48
Discharge: HOME | End: 2018-07-23
Payer: MEDICARE

## 2018-07-23 ENCOUNTER — TELEPHONE (OUTPATIENT)
Dept: PULMONOLOGY | Age: 48
End: 2018-07-23

## 2018-07-23 ENCOUNTER — HOSPITAL ENCOUNTER (OUTPATIENT)
Dept: PULMONOLOGY | Age: 48
Discharge: HOME | End: 2018-07-23
Payer: MEDICARE

## 2018-07-23 DIAGNOSIS — G47.30 SLEEP APNEA, UNSPECIFIED TYPE: ICD-10-CM

## 2018-07-23 DIAGNOSIS — R94.2 ABNORMAL FLOW VOLUME LOOP: ICD-10-CM

## 2018-07-23 DIAGNOSIS — R06.00 DYSPNEA, UNSPECIFIED TYPE: Primary | ICD-10-CM

## 2018-07-23 DIAGNOSIS — R06.00 DYSPNEA, UNSPECIFIED TYPE: ICD-10-CM

## 2018-07-23 LAB — POC CREATININE WHOLE BLOOD: 0.5 MG/DL (ref 0.5–1.2)

## 2018-07-23 PROCEDURE — 6360000002 HC RX W HCPCS

## 2018-07-23 PROCEDURE — 2580000003 HC RX 258

## 2018-07-23 PROCEDURE — 6360000004 HC RX CONTRAST MEDICATION: Performed by: INTERNAL MEDICINE

## 2018-07-23 PROCEDURE — 82565 ASSAY OF CREATININE: CPT

## 2018-07-23 PROCEDURE — 94070 EVALUATION OF WHEEZING: CPT

## 2018-07-23 PROCEDURE — 70498 CT ANGIOGRAPHY NECK: CPT

## 2018-07-23 RX ADMIN — IOPAMIDOL 90 ML: 755 INJECTION, SOLUTION INTRAVENOUS at 10:51

## 2018-08-12 PROBLEM — E55.9 VITAMIN D DEFICIENCY: Status: ACTIVE | Noted: 2018-08-12

## 2018-09-28 ENCOUNTER — TELEPHONE (OUTPATIENT)
Dept: INTERNAL MEDICINE CLINIC | Age: 48
End: 2018-09-28

## 2018-09-28 NOTE — TELEPHONE ENCOUNTER
Pt called in saying he has custody of a premie  and it is recommended that they get several vaccinations. Pt is not sure what all he needs but is asking if we can give vaccinations. I advised him we have flu, pneumonia, tdap. If needs anything else will need to get from the health dept. He states he will check it out and let us know.

## 2018-10-05 ENCOUNTER — CARE COORDINATION (OUTPATIENT)
Dept: CARE COORDINATION | Age: 48
End: 2018-10-05

## 2018-10-16 ENCOUNTER — OFFICE VISIT (OUTPATIENT)
Dept: INTERNAL MEDICINE CLINIC | Age: 48
End: 2018-10-16
Payer: COMMERCIAL

## 2018-10-16 ENCOUNTER — APPOINTMENT (OUTPATIENT)
Dept: GENERAL RADIOLOGY | Age: 48
DRG: 392 | End: 2018-10-16
Attending: INTERNAL MEDICINE
Payer: MEDICARE

## 2018-10-16 ENCOUNTER — HOSPITAL ENCOUNTER (INPATIENT)
Age: 48
LOS: 1 days | Discharge: LEFT BEFORE DISCHARGE | DRG: 392 | End: 2018-10-16
Attending: INTERNAL MEDICINE | Admitting: INTERNAL MEDICINE
Payer: MEDICARE

## 2018-10-16 VITALS
BODY MASS INDEX: 29.95 KG/M2 | DIASTOLIC BLOOD PRESSURE: 66 MMHG | SYSTOLIC BLOOD PRESSURE: 117 MMHG | HEIGHT: 75 IN | RESPIRATION RATE: 16 BRPM | OXYGEN SATURATION: 97 % | TEMPERATURE: 98.3 F | HEART RATE: 71 BPM | WEIGHT: 240.9 LBS

## 2018-10-16 VITALS
BODY MASS INDEX: 29.97 KG/M2 | DIASTOLIC BLOOD PRESSURE: 64 MMHG | HEART RATE: 84 BPM | HEIGHT: 75 IN | WEIGHT: 241 LBS | SYSTOLIC BLOOD PRESSURE: 130 MMHG

## 2018-10-16 DIAGNOSIS — G25.81 RESTLESS LEGS SYNDROME (RLS): ICD-10-CM

## 2018-10-16 DIAGNOSIS — R73.9 HYPERGLYCEMIA: ICD-10-CM

## 2018-10-16 DIAGNOSIS — E78.5 HYPERLIPIDEMIA WITH TARGET LDL LESS THAN 100: ICD-10-CM

## 2018-10-16 DIAGNOSIS — G40.909 SEIZURE DISORDER (HCC): ICD-10-CM

## 2018-10-16 DIAGNOSIS — Z79.4 TYPE 2 DIABETES MELLITUS WITH DIABETIC NEUROPATHY, WITH LONG-TERM CURRENT USE OF INSULIN (HCC): ICD-10-CM

## 2018-10-16 DIAGNOSIS — G62.9 NEUROPATHY: ICD-10-CM

## 2018-10-16 DIAGNOSIS — E11.40 TYPE 2 DIABETES MELLITUS WITH DIABETIC NEUROPATHY, WITH LONG-TERM CURRENT USE OF INSULIN (HCC): ICD-10-CM

## 2018-10-16 DIAGNOSIS — E78.2 MIXED HYPERLIPIDEMIA: ICD-10-CM

## 2018-10-16 DIAGNOSIS — K21.9 GASTROESOPHAGEAL REFLUX DISEASE WITHOUT ESOPHAGITIS: ICD-10-CM

## 2018-10-16 DIAGNOSIS — R11.2 NON-INTRACTABLE VOMITING WITH NAUSEA, UNSPECIFIED VOMITING TYPE: Primary | ICD-10-CM

## 2018-10-16 PROBLEM — R56.9 SEIZURES (HCC): Status: ACTIVE | Noted: 2018-10-16

## 2018-10-16 LAB
ALBUMIN SERPL-MCNC: 4.3 G/DL (ref 3.5–5.1)
ALP BLD-CCNC: 79 U/L (ref 38–126)
ALT SERPL-CCNC: 26 U/L (ref 11–66)
ANION GAP SERPL CALCULATED.3IONS-SCNC: 15 MEQ/L (ref 8–16)
AST SERPL-CCNC: 16 U/L (ref 5–40)
AVERAGE GLUCOSE: 228 MG/DL (ref 70–126)
BACTERIA: ABNORMAL
BILIRUB SERPL-MCNC: 1.3 MG/DL (ref 0.3–1.2)
BILIRUBIN DIRECT: 0.3 MG/DL (ref 0–0.3)
BILIRUBIN URINE: NEGATIVE
BLOOD, URINE: ABNORMAL
BUN BLDV-MCNC: 11 MG/DL (ref 7–22)
CALCIUM SERPL-MCNC: 9.5 MG/DL (ref 8.5–10.5)
CASTS: ABNORMAL /LPF
CASTS: ABNORMAL /LPF
CHARACTER, URINE: CLEAR
CHLORIDE BLD-SCNC: 96 MEQ/L (ref 98–111)
CO2: 25 MEQ/L (ref 23–33)
COLOR: YELLOW
CREAT SERPL-MCNC: 0.5 MG/DL (ref 0.4–1.2)
CRYSTALS: ABNORMAL
D-DIMER QUANTITATIVE: < 215 NG/ML FEU (ref 0–500)
EKG ATRIAL RATE: 77 BPM
EKG P AXIS: 6 DEGREES
EKG P-R INTERVAL: 170 MS
EKG Q-T INTERVAL: 372 MS
EKG QRS DURATION: 92 MS
EKG QTC CALCULATION (BAZETT): 420 MS
EKG R AXIS: 13 DEGREES
EKG T AXIS: 14 DEGREES
EKG VENTRICULAR RATE: 77 BPM
EPITHELIAL CELLS, UA: ABNORMAL /HPF
ERYTHROCYTE [DISTWIDTH] IN BLOOD BY AUTOMATED COUNT: 12 % (ref 11.5–14.5)
ERYTHROCYTE [DISTWIDTH] IN BLOOD BY AUTOMATED COUNT: 36.4 FL (ref 35–45)
GFR SERPL CREATININE-BSD FRML MDRD: > 90 ML/MIN/1.73M2
GLUCOSE BLD-MCNC: 291 MG/DL (ref 70–108)
GLUCOSE BLD-MCNC: 306 MG/DL (ref 70–108)
GLUCOSE BLD-MCNC: 330 MG/DL (ref 70–108)
GLUCOSE BLD-MCNC: 353 MG/DL (ref 70–108)
GLUCOSE, URINE: >= 1000 MG/DL
HBA1C MFR BLD: 9.6 % (ref 4.4–6.4)
HCT VFR BLD CALC: 43.6 % (ref 42–52)
HEMOGLOBIN: 16.3 GM/DL (ref 14–18)
KETONES, URINE: ABNORMAL
LEUKOCYTE ESTERASE, URINE: NEGATIVE
MCH RBC QN AUTO: 31.2 PG (ref 26–33)
MCHC RBC AUTO-ENTMCNC: 37.4 GM/DL (ref 32.2–35.5)
MCV RBC AUTO: 83.5 FL (ref 80–94)
MISCELLANEOUS LAB TEST RESULT: ABNORMAL
NITRITE, URINE: NEGATIVE
PH UA: 5
PLATELET # BLD: 187 THOU/MM3 (ref 130–400)
PMV BLD AUTO: 10.3 FL (ref 9.4–12.4)
POTASSIUM SERPL-SCNC: 4.3 MEQ/L (ref 3.5–5.2)
PRO-BNP: 9.9 PG/ML (ref 0–450)
PROTEIN UA: NEGATIVE MG/DL
RBC # BLD: 5.22 MILL/MM3 (ref 4.7–6.1)
RBC URINE: ABNORMAL /HPF
RENAL EPITHELIAL, UA: ABNORMAL
SEDIMENTATION RATE, ERYTHROCYTE: 9 MM/HR (ref 0–10)
SODIUM BLD-SCNC: 136 MEQ/L (ref 135–145)
SPECIFIC GRAVITY UA: > 1.03 (ref 1–1.03)
TOTAL PROTEIN: 7.1 G/DL (ref 6.1–8)
TROPONIN T: < 0.01 NG/ML
UROBILINOGEN, URINE: 0.2 EU/DL
WBC # BLD: 6.5 THOU/MM3 (ref 4.8–10.8)
WBC UA: ABNORMAL /HPF
YEAST: ABNORMAL

## 2018-10-16 PROCEDURE — 71045 X-RAY EXAM CHEST 1 VIEW: CPT

## 2018-10-16 PROCEDURE — 83880 ASSAY OF NATRIURETIC PEPTIDE: CPT

## 2018-10-16 PROCEDURE — 1036F TOBACCO NON-USER: CPT | Performed by: INTERNAL MEDICINE

## 2018-10-16 PROCEDURE — G0378 HOSPITAL OBSERVATION PER HR: HCPCS

## 2018-10-16 PROCEDURE — 83036 HEMOGLOBIN GLYCOSYLATED A1C: CPT

## 2018-10-16 PROCEDURE — 99214 OFFICE O/P EST MOD 30 MIN: CPT | Performed by: INTERNAL MEDICINE

## 2018-10-16 PROCEDURE — 2709999900 HC NON-CHARGEABLE SUPPLY

## 2018-10-16 PROCEDURE — 81001 URINALYSIS AUTO W/SCOPE: CPT

## 2018-10-16 PROCEDURE — 85027 COMPLETE CBC AUTOMATED: CPT

## 2018-10-16 PROCEDURE — 6370000000 HC RX 637 (ALT 250 FOR IP): Performed by: INTERNAL MEDICINE

## 2018-10-16 PROCEDURE — 1200000003 HC TELEMETRY R&B

## 2018-10-16 PROCEDURE — G8598 ASA/ANTIPLAT THER USED: HCPCS | Performed by: INTERNAL MEDICINE

## 2018-10-16 PROCEDURE — 80053 COMPREHEN METABOLIC PANEL: CPT

## 2018-10-16 PROCEDURE — G8417 CALC BMI ABV UP PARAM F/U: HCPCS | Performed by: INTERNAL MEDICINE

## 2018-10-16 PROCEDURE — G8484 FLU IMMUNIZE NO ADMIN: HCPCS | Performed by: INTERNAL MEDICINE

## 2018-10-16 PROCEDURE — 93005 ELECTROCARDIOGRAM TRACING: CPT | Performed by: INTERNAL MEDICINE

## 2018-10-16 PROCEDURE — 87086 URINE CULTURE/COLONY COUNT: CPT

## 2018-10-16 PROCEDURE — 93010 ELECTROCARDIOGRAM REPORT: CPT | Performed by: NUCLEAR MEDICINE

## 2018-10-16 PROCEDURE — 85379 FIBRIN DEGRADATION QUANT: CPT

## 2018-10-16 PROCEDURE — 36415 COLL VENOUS BLD VENIPUNCTURE: CPT

## 2018-10-16 PROCEDURE — 3045F PR MOST RECENT HEMOGLOBIN A1C LEVEL 7.0-9.0%: CPT | Performed by: INTERNAL MEDICINE

## 2018-10-16 PROCEDURE — 82248 BILIRUBIN DIRECT: CPT

## 2018-10-16 PROCEDURE — 2022F DILAT RTA XM EVC RTNOPTHY: CPT | Performed by: INTERNAL MEDICINE

## 2018-10-16 PROCEDURE — G8427 DOCREV CUR MEDS BY ELIG CLIN: HCPCS | Performed by: INTERNAL MEDICINE

## 2018-10-16 PROCEDURE — 85651 RBC SED RATE NONAUTOMATED: CPT

## 2018-10-16 PROCEDURE — 99220 PR INITIAL OBSERVATION CARE/DAY 70 MINUTES: CPT | Performed by: INTERNAL MEDICINE

## 2018-10-16 PROCEDURE — 99225 PR SBSQ OBSERVATION CARE/DAY 25 MINUTES: CPT | Performed by: INTERNAL MEDICINE

## 2018-10-16 PROCEDURE — 2580000003 HC RX 258: Performed by: INTERNAL MEDICINE

## 2018-10-16 PROCEDURE — 82948 REAGENT STRIP/BLOOD GLUCOSE: CPT

## 2018-10-16 PROCEDURE — 84484 ASSAY OF TROPONIN QUANT: CPT

## 2018-10-16 RX ORDER — CLINDAMYCIN PHOSPHATE 11.9 MG/ML
SOLUTION TOPICAL 2 TIMES DAILY PRN
Status: DISCONTINUED | OUTPATIENT
Start: 2018-10-16 | End: 2018-10-17 | Stop reason: HOSPADM

## 2018-10-16 RX ORDER — SODIUM CHLORIDE 0.9 % (FLUSH) 0.9 %
10 SYRINGE (ML) INJECTION PRN
Status: DISCONTINUED | OUTPATIENT
Start: 2018-10-16 | End: 2018-10-17 | Stop reason: HOSPADM

## 2018-10-16 RX ORDER — DEXTROSE MONOHYDRATE 50 MG/ML
100 INJECTION, SOLUTION INTRAVENOUS PRN
Status: DISCONTINUED | OUTPATIENT
Start: 2018-10-16 | End: 2018-10-17 | Stop reason: HOSPADM

## 2018-10-16 RX ORDER — ONDANSETRON 2 MG/ML
4 INJECTION INTRAMUSCULAR; INTRAVENOUS EVERY 6 HOURS PRN
Status: DISCONTINUED | OUTPATIENT
Start: 2018-10-16 | End: 2018-10-17 | Stop reason: HOSPADM

## 2018-10-16 RX ORDER — GABAPENTIN 600 MG/1
600 TABLET ORAL 4 TIMES DAILY
Qty: 360 TABLET | Refills: 1 | Status: SHIPPED | OUTPATIENT
Start: 2018-10-16 | End: 2019-01-29 | Stop reason: SDUPTHER

## 2018-10-16 RX ORDER — ATORVASTATIN CALCIUM 10 MG/1
TABLET, FILM COATED ORAL
Qty: 30 TABLET | Refills: 5 | Status: SHIPPED | OUTPATIENT
Start: 2018-10-16 | End: 2019-01-29 | Stop reason: SDUPTHER

## 2018-10-16 RX ORDER — GABAPENTIN 600 MG/1
600 TABLET ORAL 4 TIMES DAILY
Status: DISCONTINUED | OUTPATIENT
Start: 2018-10-16 | End: 2018-10-17 | Stop reason: HOSPADM

## 2018-10-16 RX ORDER — LEVETIRACETAM 500 MG/1
2000 TABLET ORAL 2 TIMES DAILY
Status: DISCONTINUED | OUTPATIENT
Start: 2018-10-16 | End: 2018-10-17 | Stop reason: HOSPADM

## 2018-10-16 RX ORDER — ONDANSETRON 2 MG/ML
4 INJECTION INTRAMUSCULAR; INTRAVENOUS EVERY 4 HOURS
Status: DISPENSED | OUTPATIENT
Start: 2018-10-16 | End: 2018-10-16

## 2018-10-16 RX ORDER — DEXTROSE MONOHYDRATE 25 G/50ML
12.5 INJECTION, SOLUTION INTRAVENOUS PRN
Status: DISCONTINUED | OUTPATIENT
Start: 2018-10-16 | End: 2018-10-17 | Stop reason: HOSPADM

## 2018-10-16 RX ORDER — ZONISAMIDE 100 MG/1
500 CAPSULE ORAL NIGHTLY
Status: DISCONTINUED | OUTPATIENT
Start: 2018-10-16 | End: 2018-10-17 | Stop reason: HOSPADM

## 2018-10-16 RX ORDER — ATORVASTATIN CALCIUM 10 MG/1
10 TABLET, FILM COATED ORAL NIGHTLY
Status: DISCONTINUED | OUTPATIENT
Start: 2018-10-16 | End: 2018-10-17 | Stop reason: HOSPADM

## 2018-10-16 RX ORDER — DULOXETIN HYDROCHLORIDE 60 MG/1
60 CAPSULE, DELAYED RELEASE ORAL DAILY
Status: DISCONTINUED | OUTPATIENT
Start: 2018-10-17 | End: 2018-10-17 | Stop reason: HOSPADM

## 2018-10-16 RX ORDER — NICOTINE POLACRILEX 4 MG
15 LOZENGE BUCCAL PRN
Status: DISCONTINUED | OUTPATIENT
Start: 2018-10-16 | End: 2018-10-17 | Stop reason: HOSPADM

## 2018-10-16 RX ORDER — POTASSIUM CHLORIDE 20 MEQ/1
40 TABLET, EXTENDED RELEASE ORAL PRN
Status: DISCONTINUED | OUTPATIENT
Start: 2018-10-16 | End: 2018-10-17 | Stop reason: HOSPADM

## 2018-10-16 RX ORDER — DIVALPROEX SODIUM 500 MG/1
500 TABLET, DELAYED RELEASE ORAL 2 TIMES DAILY
Status: DISCONTINUED | OUTPATIENT
Start: 2018-10-16 | End: 2018-10-17 | Stop reason: HOSPADM

## 2018-10-16 RX ORDER — SILDENAFIL 100 MG/1
100 TABLET, FILM COATED ORAL PRN
Status: DISCONTINUED | OUTPATIENT
Start: 2018-10-16 | End: 2018-10-16 | Stop reason: CLARIF

## 2018-10-16 RX ORDER — SODIUM CHLORIDE 9 MG/ML
INJECTION, SOLUTION INTRAVENOUS CONTINUOUS
Status: DISCONTINUED | OUTPATIENT
Start: 2018-10-16 | End: 2018-10-17 | Stop reason: HOSPADM

## 2018-10-16 RX ORDER — PROMETHAZINE HYDROCHLORIDE 25 MG/ML
12.5 INJECTION, SOLUTION INTRAMUSCULAR; INTRAVENOUS EVERY 8 HOURS PRN
Status: DISCONTINUED | OUTPATIENT
Start: 2018-10-16 | End: 2018-10-17 | Stop reason: HOSPADM

## 2018-10-16 RX ORDER — POTASSIUM CHLORIDE 7.45 MG/ML
10 INJECTION INTRAVENOUS PRN
Status: DISCONTINUED | OUTPATIENT
Start: 2018-10-16 | End: 2018-10-17 | Stop reason: HOSPADM

## 2018-10-16 RX ORDER — OMEGA-3-ACID ETHYL ESTERS 1 G/1
1 CAPSULE, LIQUID FILLED ORAL 2 TIMES DAILY
Qty: 60 CAPSULE | Refills: 5 | Status: SHIPPED | OUTPATIENT
Start: 2018-10-16 | End: 2019-01-29

## 2018-10-16 RX ORDER — LORAZEPAM 0.5 MG/1
0.5 TABLET ORAL 2 TIMES DAILY PRN
Status: DISCONTINUED | OUTPATIENT
Start: 2018-10-16 | End: 2018-10-17 | Stop reason: HOSPADM

## 2018-10-16 RX ORDER — ROPINIROLE 1 MG/1
1 TABLET, FILM COATED ORAL 3 TIMES DAILY
Status: DISCONTINUED | OUTPATIENT
Start: 2018-10-16 | End: 2018-10-17 | Stop reason: HOSPADM

## 2018-10-16 RX ORDER — LEVETIRACETAM 500 MG/1
2000 TABLET ORAL 2 TIMES DAILY
Status: DISCONTINUED | OUTPATIENT
Start: 2018-10-16 | End: 2018-10-16

## 2018-10-16 RX ORDER — LEVETIRACETAM 500 MG/1
1000 TABLET ORAL ONCE
Status: DISCONTINUED | OUTPATIENT
Start: 2018-10-16 | End: 2018-10-16

## 2018-10-16 RX ORDER — FENOFIBRATE 145 MG/1
145 TABLET, COATED ORAL DAILY
Qty: 90 TABLET | Refills: 1 | Status: SHIPPED | OUTPATIENT
Start: 2018-10-16 | End: 2019-01-29 | Stop reason: SDUPTHER

## 2018-10-16 RX ORDER — OMEGA-3-ACID ETHYL ESTERS 1 G/1
1 CAPSULE, LIQUID FILLED ORAL 2 TIMES DAILY
Status: DISCONTINUED | OUTPATIENT
Start: 2018-10-16 | End: 2018-10-17 | Stop reason: HOSPADM

## 2018-10-16 RX ORDER — SODIUM CHLORIDE 0.9 % (FLUSH) 0.9 %
10 SYRINGE (ML) INJECTION EVERY 12 HOURS SCHEDULED
Status: DISCONTINUED | OUTPATIENT
Start: 2018-10-16 | End: 2018-10-17 | Stop reason: HOSPADM

## 2018-10-16 RX ORDER — PANTOPRAZOLE SODIUM 40 MG/1
40 TABLET, DELAYED RELEASE ORAL 2 TIMES DAILY
Status: DISCONTINUED | OUTPATIENT
Start: 2018-10-16 | End: 2018-10-17 | Stop reason: HOSPADM

## 2018-10-16 RX ORDER — ROPINIROLE 1 MG/1
TABLET, FILM COATED ORAL
Qty: 270 TABLET | Refills: 1 | Status: SHIPPED | OUTPATIENT
Start: 2018-10-16 | End: 2018-11-26 | Stop reason: SDUPTHER

## 2018-10-16 RX ORDER — PANTOPRAZOLE SODIUM 40 MG/1
40 TABLET, DELAYED RELEASE ORAL 2 TIMES DAILY
Qty: 180 TABLET | Refills: 1 | Status: SHIPPED | OUTPATIENT
Start: 2018-10-16 | End: 2019-01-29 | Stop reason: SDUPTHER

## 2018-10-16 RX ORDER — POTASSIUM CHLORIDE 20MEQ/15ML
40 LIQUID (ML) ORAL PRN
Status: DISCONTINUED | OUTPATIENT
Start: 2018-10-16 | End: 2018-10-17 | Stop reason: HOSPADM

## 2018-10-16 RX ORDER — LEVETIRACETAM 500 MG/1
2000 TABLET ORAL ONCE
Status: DISCONTINUED | OUTPATIENT
Start: 2018-10-16 | End: 2018-10-17 | Stop reason: HOSPADM

## 2018-10-16 RX ORDER — PROMETHAZINE HYDROCHLORIDE 25 MG/1
12.5 TABLET ORAL EVERY 4 HOURS PRN
Status: DISCONTINUED | OUTPATIENT
Start: 2018-10-16 | End: 2018-10-17 | Stop reason: HOSPADM

## 2018-10-16 RX ORDER — METOPROLOL TARTRATE 50 MG/1
50 TABLET, FILM COATED ORAL 2 TIMES DAILY
Status: DISCONTINUED | OUTPATIENT
Start: 2018-10-16 | End: 2018-10-17 | Stop reason: HOSPADM

## 2018-10-16 RX ADMIN — INSULIN LISPRO 30 UNITS: 100 INJECTION, SOLUTION INTRAVENOUS; SUBCUTANEOUS at 19:28

## 2018-10-16 RX ADMIN — ROPINIROLE HYDROCHLORIDE 1 MG: 1 TABLET, FILM COATED ORAL at 17:13

## 2018-10-16 RX ADMIN — GABAPENTIN 600 MG: 600 TABLET, FILM COATED ORAL at 17:14

## 2018-10-16 RX ADMIN — Medication 6 UNITS: at 17:13

## 2018-10-16 RX ADMIN — SODIUM CHLORIDE: 9 INJECTION, SOLUTION INTRAVENOUS at 19:28

## 2018-10-16 ASSESSMENT — PAIN DESCRIPTION - LOCATION: LOCATION: FOOT;BACK

## 2018-10-16 ASSESSMENT — PAIN DESCRIPTION - FREQUENCY: FREQUENCY: CONTINUOUS

## 2018-10-16 ASSESSMENT — PAIN DESCRIPTION - PAIN TYPE: TYPE: CHRONIC PAIN

## 2018-10-16 ASSESSMENT — PAIN DESCRIPTION - ORIENTATION: ORIENTATION: RIGHT;LEFT

## 2018-10-16 ASSESSMENT — PAIN SCALES - GENERAL: PAINLEVEL_OUTOF10: 5

## 2018-10-16 NOTE — CONSULTS
Topics Concern    Not on file     Social History Narrative    No narrative on file     Family History      Family History   Problem Relation Age of Onset    Heart Disease Father 61        MI    Stroke Brother     Obesity Brother     Arthritis Mother     Seizures Mother     Obesity Sister     Other Brother         pulmonary embolism    Diabetes Daughter     Seizures Brother      Sleep History    He was requested to have sleep test.  He refused to go for sleep test- He told me that it is waste of money due to his hx of claustrophobia. Sleeping habits:  Time to go to bed: 11:00            PM  Time to wake up: 5:30 to 6:00 AM     Sleep History:  Pt with history of snoring and daytime fatigue  Morning headache:Yes   Dryness of mouth in the morning:Yes  Hx of snoring:Yes  Witnessed apneas:Yes. He was noted to have witnessed apneas during his hospital [facility] at [facility]. Excessive day time sleepiness:Yes. See below for Burton score  Hypnogogic Hallucinations:No  Hypnopompic Hallucinations:No  Symptoms suggestive of Restless leg syndrome:No  History of Seizures: Yes  Sleep Walking:No  Sleep Talking:No  Sleep paralysis: No  Cataplexy: No        [place] Sleepiness Score:   Sitting and readin  Watching TV:1  Sitting inactive in a public place:3  Being a passenger in a motor vehicle for an hour or more:0  Lying down in the afternoon:0  Sitting and talking to someone:0  Sitting quietly after lunch (no alcohol):0  Stopped for a few minutes in traffic while drivin  Total Score:4       [place] of systems   General/Constitutional: No recent loss of weight or appetite changes. No fever or chills. HENT: Negative. Eyes: Negative. Upper respiratory tract: No nasal stuffiness or post nasal drip. Lower respiratory tract/ lungs: No cough or sputum production. No hemoptysis. Cardiovascular: No palpitations. Vague chest pain. Gastrointestinal: Occasional nausea with vomiting.   Neurological: No focal

## 2018-10-16 NOTE — PROGRESS NOTES
tablet 2    glucose blood VI test strips (ONE TOUCH TEST STRIPS) strip Use to test blood glucose level 4 times per day. Diagnosis: E11.40 150 each 5    Cholecalciferol (VITAMIN D) 2000 units CAPS capsule Take 2,000 Units by mouth 2 times daily  30 capsule 0    sildenafil (VIAGRA) 100 MG tablet Take 1 tablet by mouth as needed for Erectile Dysfunction 45 tablet 3    lacosamide (VIMPAT) 50 MG TABS tablet Take 250 mg by mouth 2 times daily. Takes with 100 mg to total 250 mg bid.  Blood Glucose Monitoring Suppl (ONE TOUCH BASIC SYSTEM) W/DEVICE KIT Use to test blood glucose level 4 times per day. Diagnosis: E11.40 1 kit 0    clindamycin (CLEOCIN T) 1 % external solution Apply topically 2 times daily. (Patient taking differently: as needed Apply topically 2 times daily. ) 30 mL 5    levETIRAcetam (KEPPRA) 500 MG tablet Take 2,000 mg by mouth 2 times daily       LORazepam (ATIVAN) 0.5 MG tablet Take 0.5 mg by mouth 2 times daily as needed (seizures)       zonisamide (ZONEGRAN) 100 MG capsule Take 500 mg by mouth nightly        No current facility-administered medications for this visit.         Allergies   Allergen Reactions    Ciprofloxacin-Ciproflox Hcl Er Other (See Comments)     Elevated creatinine    Heparin      Monitor for thrombocytopenia    Metformin Nausea Only     Abdominal pain, diarrhea    Niacin And Related Other (See Comments)     Burning sensation, severe flushing    Tramadol Hcl      Contraindication to seizure medications    Ultram [Tramadol]      Contraindication to seizure medications    Warfarin      Very difficult to regulate in past       Social History     Social History    Marital status:      Spouse name: N/A    Number of children: 3    Years of education: N/A     Occupational History    Disability since 2011      Social History Main Topics    Smoking status: Never Smoker    Smokeless tobacco: Never Used    Alcohol use No    Drug use: No    Sexual activity: Not

## 2018-10-16 NOTE — H&P
on cath 4/2016)    Chronic back pain     Chronic bronchitis ([facility]     Dr. Mike Choe 10/2012 - no longer following with him    Colon polyp 08/30/2016    Depression     Dr. Yanira Hudson DVT (deep venous thrombosis) ([facility] [id number]    not on Coumadin due to unable to regulate - Dr. Brian Benjamin - no etiology found per patient    Esophageal abnormality     nodule     Fatty liver disease, nonalcoholic     U/S 19/0994 [facility]    Furuncle     legs    History of Doppler ultrasound 5-29-11    No hemodynamically significant carotid stenosis is identified. A thyroid nodule on each side. Dedicated ultrasound of thyroid gland is suggested to further evaluate if clinically indicated.      Hx of blood clots     Hyperlipidemia     severely elevated triglycerides    Hypertension     diastolic    Intracranial arachnoid cyst 11/2012    Dr. Fide Watkins drained, complicated with seizures, DKA    Irritable bowel syndrome     Liver disease     Lupie Sicilian - elevated LFT - positive smooth muscle antibody, steatosis per liver bx 3/2014 with Dr. Yan Reeder (multiple drug resistant organisms) resistance 2012    MRSA (methicillin resistant staph aureus) culture positive     h/o in foot and before brain surgery    Nephrolithiasis     noted on CT abdomen 9/2016    Neuropathy     Pancreatic insufficiency     Positive SANDY (antinuclear antibody)     Dr. Levi Wilkerson - first visit in 6/2013    Pulmonary embolism ([facility] 2007    s/p GFF, related to knee surgery    Restless legs syndrome     Seizures ([facility]     Sinus tachycardia     Holter 3/2013 - seeing Dr. Amy Hilario fracture [facility])     clips     Sleep apnea     multiple MD's suggested testing but he refuses to be tested as claustraphobic and won't use Cpap    Type II or unspecified type diabetes mellitus without mention of complication, not stated as uncontrolled 2007       Past Surgical History         Procedure Laterality Date    CARDIAC CATHETERIZATION      2011,5-6 years ago   [facility] CATHETERIZATION  3-2012    CARDIAC CATHETERIZATION  04/28/2016    [address] RELEASE  01/2017    CHOLECYSTECTOMY  2004    COLONOSCOPY  2012    COLONOSCOPY  08/2016    2 tubular adenomas - reportedly needs repeat scope in 6 months    CRANIOTOMY  11/2012    arachnoid cyst drainage    EKG 12-LEAD  10/18/2015         ENDOSCOPY, COLON, DIAGNOSTIC      HERNIA REPAIR Right 1996    [facility]--Dr. Kayleen Barker INGUINAL HERNIA REPAIR Right 09/15/2016    Robotic assisted    KNEE ARTHROSCOPY Right 2016    KNEE SURGERY Left 2007    acl and debrided twice    OTHER SURGICAL HISTORY  5-31-11    Tilt table was associated w/ nonspecific symptoms or nausea, otherwise no significant dizziness or syncope. No significant hemodynamic changes. Otherwise, unremarkable tilt table test after 30 minutes of tilting.  OTHER SURGICAL HISTORY  01/20/2017    RIGHT SHOULDER ARTHROSCOPY, OPEN STAN, OPEN ACROMIOPLASTY, BICEP TENDESIS, RIGHT CARPAL TUNNEL RELEASE    SHOULDER SURGERY Right 01/2007    TONSILLECTOMY      TRANSTHORACIC ECHOCARDIOGRAM  3-05-11    LV size and systolic function normal. EF 55-65%.  UPPER GASTROINTESTINAL ENDOSCOPY  2012    UPPER GASTROINTESTINAL ENDOSCOPY  2016    Dr. Moon Landon  2007         Medications prior to admission      Prior to Admission medications    Medication Sig Start Date End Date Taking?  Authorizing Provider   canagliflozin (INVOKANA) 300 MG TABS tablet TAKE 1 TABLET BY MOUTH IN THE MORNING BEFORE breakfast 10/16/18  Yes Ekaterina Pink MD   omega-3 acid ethyl esters (LOVAZA) 1 g capsule Take 1 capsule by mouth 2 times daily 10/16/18  Yes Ekaterina Pink MD   atorvastatin (LIPITOR) 10 MG tablet TAKE 1 TABLET BY MOUTH ONE TIME A DAY 10/16/18  Yes Ekaterina Pink MD   insulin detemir (LEVEMIR FLEXTOUCH) 100 UNIT/ML injection pen 70 units every AM and 45 units every PM 10/16/18  Yes Ekaterina Pink MD   insulin aspart (Rollen Fothergill) 100 No apparent distress. HEENT:Normocephalic Pupils equal, round, and reactive to light. Extraocular motion intact. Conjunctivae/corneas clear. Oral mucosa dry without erythema or exudate. Neck: Supple  Cardiovascular:  Regular rate andrhythm with normal S1/S2 without murmurs, rubs or gallops. No tenderness to palpation of the abdomen  Respiratory:  Normal respiratory effort. Clear to auscultation, bilaterally without Rales/Wheezes/Rhonchi. Abdomen: Soft, non-tender, non-distended with normal bowel sounds. Musculoskeletal:  No clubbing, no peripheral edema bilaterally. .  Full range of motion without deformity. Neurologic: No focal sensory/motor deficits. Cranial nerves: II-XII intact, grossly non-focal.  Lymphatic: No cervical,supra/infraclavicular, axillary lymphadenopathy. Psychiatric:  Alert and oriented. Normal judgement. Conversational.  Vascular: Dorsalis pedis and radial pulses palpable bilaterally   Genitourinary: Deferred. Skin: Skin color, texture, turgor normal.  No rashes or lesions. Labs and imaging     No results for input(s): WBC, HGB, HCT, PLT in the last 72 hours. No results for input(s): NA, K, CL, CO2, BUN, CREATININE, CALCIUM, PHOS in the last 72 hours. Invalid input(s): MAGNES  No results for input(s): AST, ALT, BILIDIR, BILITOT, ALKPHOS in the last 72 hours. No results for input(s): INR in the last 72 hours. No results for input(s): Paulette Stack in the last 72 hours. Urinalysis:   Lab Results   Component Value Date    NITRU NEGATIVE 06/05/2018    WBCUA 0-2 06/05/2018    WBCUA 6-10 10/17/2015    BACTERIA NONE 06/05/2018    RBCUA 0-2 06/05/2018    BLOODU NEGATIVE 06/05/2018    SPECGRAV 1.024 10/16/2016    GLUCOSEU >= 1000 06/05/2018       Radiology:       NM GASTRIC EMPTYING    (Results Pending)       No results found. Assessment/plan     ASSESSMENT/PLAN:  1. Nausea and vomiting, unclear etiology, possibly viral gastroenteritis  1.  Schedule Zofran x 2 doses,

## 2018-10-16 NOTE — PROGRESS NOTES
bridging on cath 4/2016)    Chronic back pain     Chronic bronchitis ([facility] [address]     Dr. Amina Mccallum 10/2012 - no longer following with him    Colon polyp 08/30/2016    Depression     Dr. Kami Gonzalez DVT (deep venous thrombosis) ([facility] [address] [id number]    not on Coumadin due to unable to regulate - Dr. Hudson - no etiology found per patient    Esophageal abnormality     nodule     Fatty liver disease, nonalcoholic     U/S 69/6488 [facility]    Furuncle     legs    History of Doppler ultrasound 5-29-11    No hemodynamically significant carotid stenosis is identified. A thyroid nodule on each side. Dedicated ultrasound of thyroid gland is suggested to further evaluate if clinically indicated.      Hx of blood clots     Hyperlipidemia     severely elevated triglycerides    Hypertension     diastolic    Intracranial arachnoid cyst 11/2012    Dr. Vicky Shah drained, complicated with seizures, DKA    Irritable bowel syndrome     Liver disease     Kate Ishui - elevated LFT - positive smooth muscle antibody, steatosis per liver bx 3/2014 with Dr. Mari Luis Fernando (multiple drug resistant organisms) resistance 2012    MRSA (methicillin resistant staph aureus) culture positive     h/o in foot and before brain surgery    Nephrolithiasis     noted on CT abdomen 9/2016    Neuropathy     Pancreatic insufficiency     Positive SANDY (antinuclear antibody)     Dr. Ramone Bowen - first visit in 6/2013    Pulmonary embolism ([facility] [address] 2007    s/p GFF, related to knee surgery    Restless legs syndrome     Seizures ([facility] [address]     Sinus tachycardia     Holter 3/2013 - seeing Dr. Claudy Hughes fracture [facility])     clips     Sleep apnea     multiple MD's suggested testing but he refuses to be tested as claustraphobic and won't use Cpap    Type II or unspecified type diabetes mellitus without mention of complication, not stated as uncontrolled 2007       Past Surgical History         Procedure Laterality Date    CARDIAC CATHETERIZATION      2011,5-6 years ago    100 UNIT/ML injection pen Inject 30 Units into the skin 3 times daily (before meals) Plus sliding scale 4:50 > 150 mg/dl 10/16/18  Yes Jeannette Alcantar MD   gabapentin (NEURONTIN) 600 MG tablet Take 1 tablet by mouth 4 times daily for 180 days. . 10/16/18 4/14/19 Yes Jeannette Alcantar MD   rOPINIRole (REQUIP) 1 MG tablet TAKE 1 TABLET BY MOUTH THREE TIMES A DAY 10/16/18  Yes Jeannette Alcantar MD   pantoprazole (PROTONIX) 40 MG tablet Take 1 tablet by mouth 2 times daily 10/16/18  Yes Jeannette Alcantar MD   fenofibrate (TRICOR) 145 MG tablet Take 1 tablet by mouth daily 10/16/18  Yes Jeannette Alcantar MD   Pancrelipase, Lip-Prot-Amyl, (CREON) 58945 units CPEP Take by mouth   Yes Historical Provider, MD   metoprolol tartrate (LOPRESSOR) 50 MG tablet Take 1 tablet by mouth 2 times daily  Patient taking differently: Take 75 mg by mouth 2 times daily  4/19/18  Yes Jeannette Alcantar MD   DULoxetine (CYMBALTA) 60 MG extended release capsule Take 60 mg by mouth daily  4/5/18  Yes Historical Provider, MD   divalproex (DEPAKOTE) 500 MG DR tablet Take 500 mg by mouth 2 times daily  4/5/18  Yes Historical Provider, MD   promethazine (PHENERGAN) 12.5 MG tablet Take 1-2 tablets by mouth every 8 hours as needed for Nausea 4/9/18  Yes Jeannette Alcantar MD   Cholecalciferol (VITAMIN D) 2000 units CAPS capsule Take 2,000 Units by mouth 2 times daily  10/10/17  Yes Jovita Ruffin MD   lacosamide (VIMPAT) 50 MG TABS tablet Take 250 mg by mouth 2 times daily.  Takes with 100 mg to total 250 mg bid. 7/19/17  Yes Historical Provider, MD   levETIRAcetam (KEPPRA) 500 MG tablet Take 2,000 mg by mouth 2 times daily    Yes Historical Provider, MD   LORazepam (ATIVAN) 0.5 MG tablet Take 0.5 mg by mouth 2 times daily as needed (seizures)    Yes Historical Provider, MD   zonisamide (ZONEGRAN) 100 MG capsule Take 500 mg by mouth nightly    Yes Historical Provider, MD   glucose blood VI test strips (ONE TOUCH TEST STRIPS) strip Use to test blood glucose level 4 times per day. Diagnosis: E11.40 12/1/17   Jhonny Olmos MD   sildenafil (VIAGRA) 100 MG tablet Take 1 tablet by mouth as needed for Erectile Dysfunction 9/5/17   Bay Connelly MD   Blood Glucose Monitoring Suppl ([address]) W/DEVICE KIT Use to test blood glucose level 4 times per day. Diagnosis: E11.40 4/6/17   Jhonny Olmos MD   clindamycin (CLEOCIN T) 1 % external solution Apply topically 2 times daily. Patient taking differently: as needed Apply topically 2 times daily. 8/4/16   Bay Connelly MD         Allergies     Ciprofloxacin-ciproflox hcl er; Heparin; Metformin; Niacin and related; Tramadol hcl; Ultram [tramadol]; and Warfarin      Family History       Family History   Problem Relation Age of Onset    Heart Disease Father 61        MI    Stroke Brother     Obesity Brother     Arthritis Mother     Seizures Mother     Obesity Sister     Other Brother         pulmonary embolism    Diabetes Daughter     Seizures Brother        Social History       Social History     Social History    Marital status:      Spouse name: N/A    Number of children: 3    Years of education: N/A     Occupational History    Disability since 2011      Social History Main Topics    Smoking status: Never Smoker    Smokeless tobacco: Never Used    Alcohol use No    Drug use: No    Sexual activity: Not on file     Other Topics Concern    Not on file     Social History Narrative    No narrative on file         TOBACCO:   reports that he has never smoked. He has never used smokeless tobacco.  ETOH:   reports that he does not drink alcohol. Review of systems     Pertinent positives as noted in the HPI. All other systemsreviewed and negative.     Review of Systems      Physical examination     BP (!) 144/89   Pulse 82   Temp 98.2 °F (36.8 °C) (Oral)   Resp 18   Ht 6' 3\" (1.905 m)   Wt 240 lb 14.4 oz (109.3 kg)   SpO2 96%   BMI 30.11 kg/m²     General

## 2018-10-18 LAB — URINE CULTURE, ROUTINE: NORMAL

## 2018-11-26 DIAGNOSIS — Z79.4 TYPE 2 DIABETES MELLITUS WITH DIABETIC NEUROPATHY, WITH LONG-TERM CURRENT USE OF INSULIN (HCC): ICD-10-CM

## 2018-11-26 DIAGNOSIS — G25.81 RESTLESS LEGS SYNDROME (RLS): ICD-10-CM

## 2018-11-26 DIAGNOSIS — E11.40 TYPE 2 DIABETES MELLITUS WITH DIABETIC NEUROPATHY, WITH LONG-TERM CURRENT USE OF INSULIN (HCC): ICD-10-CM

## 2018-11-26 NOTE — TELEPHONE ENCOUNTER
Patient needs scripts to print and he will mail to his mail-away when needed. I will call patient when scripts are ready to be picked up.

## 2018-11-27 RX ORDER — ROPINIROLE 1 MG/1
TABLET, FILM COATED ORAL
Qty: 270 TABLET | Refills: 1 | Status: SHIPPED | OUTPATIENT
Start: 2018-11-27 | End: 2019-01-29 | Stop reason: SDUPTHER

## 2018-12-04 DIAGNOSIS — R11.2 NON-INTRACTABLE VOMITING WITH NAUSEA, UNSPECIFIED VOMITING TYPE: ICD-10-CM

## 2018-12-04 RX ORDER — PROMETHAZINE HYDROCHLORIDE 12.5 MG/1
12.5-25 TABLET ORAL EVERY 8 HOURS PRN
Qty: 60 TABLET | Refills: 2 | OUTPATIENT
Start: 2018-12-04 | End: 2018-12-13

## 2018-12-05 ENCOUNTER — HOSPITAL ENCOUNTER (OUTPATIENT)
Age: 48
Discharge: HOME | End: 2018-12-05
Payer: MEDICARE

## 2018-12-05 ENCOUNTER — OFFICE VISIT (OUTPATIENT)
Dept: INTERNAL MEDICINE CLINIC | Age: 48
End: 2018-12-05
Payer: MEDICARE

## 2018-12-05 ENCOUNTER — TELEPHONE (OUTPATIENT)
Dept: INTERNAL MEDICINE CLINIC | Age: 48
End: 2018-12-05

## 2018-12-05 VITALS
WEIGHT: 249.2 LBS | BODY MASS INDEX: 30.99 KG/M2 | DIASTOLIC BLOOD PRESSURE: 90 MMHG | SYSTOLIC BLOOD PRESSURE: 140 MMHG | HEIGHT: 75 IN | HEART RATE: 116 BPM

## 2018-12-05 DIAGNOSIS — E04.1 THYROID NODULE: ICD-10-CM

## 2018-12-05 DIAGNOSIS — E11.40 TYPE 2 DIABETES MELLITUS WITH DIABETIC NEUROPATHY, WITH LONG-TERM CURRENT USE OF INSULIN (HCC): Primary | ICD-10-CM

## 2018-12-05 DIAGNOSIS — Z79.4 TYPE 2 DIABETES MELLITUS WITH DIABETIC NEUROPATHY, WITH LONG-TERM CURRENT USE OF INSULIN (HCC): Primary | ICD-10-CM

## 2018-12-05 DIAGNOSIS — E11.40 TYPE 2 DIABETES MELLITUS WITH DIABETIC NEUROPATHY, WITH LONG-TERM CURRENT USE OF INSULIN (HCC): ICD-10-CM

## 2018-12-05 DIAGNOSIS — Z79.4 TYPE 2 DIABETES MELLITUS WITH DIABETIC NEUROPATHY, WITH LONG-TERM CURRENT USE OF INSULIN (HCC): ICD-10-CM

## 2018-12-05 DIAGNOSIS — R89.9 ABNORMAL LABORATORY TEST RESULT: Primary | ICD-10-CM

## 2018-12-05 LAB
ANION GAP SERPL CALCULATED.3IONS-SCNC: 17 MEQ/L (ref 8–16)
BUN BLDV-MCNC: 8 MG/DL (ref 7–22)
CALCIUM SERPL-MCNC: 9.1 MG/DL (ref 8.5–10.5)
CHLORIDE BLD-SCNC: 101 MEQ/L (ref 98–111)
CO2: 24 MEQ/L (ref 23–33)
CREAT SERPL-MCNC: 0.6 MG/DL (ref 0.4–1.2)
GFR SERPL CREATININE-BSD FRML MDRD: > 90 ML/MIN/1.73M2
GLUCOSE BLD-MCNC: 123 MG/DL
GLUCOSE BLD-MCNC: 66 MG/DL (ref 70–108)
HBA1C MFR BLD: 9.3 % (ref 4.3–5.7)
POTASSIUM SERPL-SCNC: 3.3 MEQ/L (ref 3.5–5.2)
SODIUM BLD-SCNC: 142 MEQ/L (ref 135–145)
TSH SERPL DL<=0.05 MIU/L-ACNC: 1.31 UIU/ML (ref 0.4–4.2)

## 2018-12-05 PROCEDURE — 82962 GLUCOSE BLOOD TEST: CPT | Performed by: NURSE PRACTITIONER

## 2018-12-05 PROCEDURE — 80048 BASIC METABOLIC PNL TOTAL CA: CPT

## 2018-12-05 PROCEDURE — 84443 ASSAY THYROID STIM HORMONE: CPT

## 2018-12-05 PROCEDURE — 36415 COLL VENOUS BLD VENIPUNCTURE: CPT

## 2018-12-05 PROCEDURE — 83036 HEMOGLOBIN GLYCOSYLATED A1C: CPT | Performed by: NURSE PRACTITIONER

## 2018-12-05 PROCEDURE — 99214 OFFICE O/P EST MOD 30 MIN: CPT | Performed by: NURSE PRACTITIONER

## 2018-12-05 RX ORDER — TRAZODONE HYDROCHLORIDE 100 MG/1
TABLET ORAL
Refills: 2 | COMMUNITY
Start: 2018-12-04 | End: 2019-05-16

## 2018-12-05 RX ORDER — POTASSIUM CHLORIDE 20 MEQ/1
40 TABLET, EXTENDED RELEASE ORAL ONCE
Qty: 2 TABLET | Refills: 0 | Status: SHIPPED | OUTPATIENT
Start: 2018-12-05 | End: 2019-01-10

## 2018-12-05 RX ORDER — DULOXETIN HYDROCHLORIDE 30 MG/1
CAPSULE, DELAYED RELEASE ORAL
Refills: 2 | COMMUNITY
Start: 2018-12-04 | End: 2019-04-16 | Stop reason: DRUGHIGH

## 2018-12-06 ENCOUNTER — HOSPITAL ENCOUNTER (OUTPATIENT)
Age: 48
Discharge: HOME | End: 2018-12-06
Payer: MEDICARE

## 2018-12-06 ENCOUNTER — TELEPHONE (OUTPATIENT)
Dept: INTERNAL MEDICINE CLINIC | Age: 48
End: 2018-12-06

## 2018-12-06 ENCOUNTER — APPOINTMENT (OUTPATIENT)
Dept: CT IMAGING | Age: 48
End: 2018-12-06
Payer: MEDICARE

## 2018-12-06 ENCOUNTER — HOSPITAL ENCOUNTER (EMERGENCY)
Age: 48
Discharge: HOME | End: 2018-12-06
Payer: MEDICARE

## 2018-12-06 VITALS
OXYGEN SATURATION: 98 % | TEMPERATURE: 97.6 F | BODY MASS INDEX: 30.84 KG/M2 | SYSTOLIC BLOOD PRESSURE: 117 MMHG | WEIGHT: 248 LBS | HEIGHT: 75 IN | DIASTOLIC BLOOD PRESSURE: 73 MMHG | RESPIRATION RATE: 18 BRPM | HEART RATE: 82 BPM

## 2018-12-06 DIAGNOSIS — E11.65 UNCONTROLLED TYPE 2 DIABETES MELLITUS WITH HYPERGLYCEMIA (HCC): ICD-10-CM

## 2018-12-06 DIAGNOSIS — R89.9 ABNORMAL LABORATORY TEST RESULT: ICD-10-CM

## 2018-12-06 DIAGNOSIS — R11.2 NON-INTRACTABLE VOMITING WITH NAUSEA, UNSPECIFIED VOMITING TYPE: ICD-10-CM

## 2018-12-06 DIAGNOSIS — R10.13 EPIGASTRIC PAIN: Primary | ICD-10-CM

## 2018-12-06 DIAGNOSIS — E11.40 TYPE 2 DIABETES MELLITUS WITH DIABETIC NEUROPATHY, WITH LONG-TERM CURRENT USE OF INSULIN (HCC): ICD-10-CM

## 2018-12-06 DIAGNOSIS — R35.0 URINARY FREQUENCY: ICD-10-CM

## 2018-12-06 DIAGNOSIS — Z79.4 TYPE 2 DIABETES MELLITUS WITH DIABETIC NEUROPATHY, WITH LONG-TERM CURRENT USE OF INSULIN (HCC): ICD-10-CM

## 2018-12-06 DIAGNOSIS — R35.0 URINARY FREQUENCY: Primary | ICD-10-CM

## 2018-12-06 LAB
ALBUMIN SERPL-MCNC: 3.8 G/DL (ref 3.5–5.1)
ALP BLD-CCNC: 70 U/L (ref 38–126)
ALT SERPL-CCNC: 30 U/L (ref 11–66)
AMPHETAMINE+METHAMPHETAMINE URINE SCREEN: NEGATIVE
AMYLASE: 24 U/L (ref 20–104)
ANION GAP SERPL CALCULATED.3IONS-SCNC: 12 MEQ/L (ref 8–16)
ANION GAP SERPL CALCULATED.3IONS-SCNC: 13 MEQ/L (ref 8–16)
AST SERPL-CCNC: 19 U/L (ref 5–40)
BACTERIA: ABNORMAL
BACTERIA: ABNORMAL /HPF
BARBITURATE QUANTITATIVE URINE: NEGATIVE
BASOPHILS # BLD: 0.3 %
BASOPHILS # BLD: 0.4 %
BASOPHILS ABSOLUTE: 0 THOU/MM3 (ref 0–0.1)
BASOPHILS ABSOLUTE: 0 THOU/MM3 (ref 0–0.1)
BENZODIAZEPINE QUANTITATIVE URINE: NEGATIVE
BILIRUB SERPL-MCNC: 1.1 MG/DL (ref 0.3–1.2)
BILIRUBIN URINE: NEGATIVE
BILIRUBIN URINE: NEGATIVE
BLOOD, URINE: NEGATIVE
BLOOD, URINE: NEGATIVE
BUN BLDV-MCNC: 12 MG/DL (ref 7–22)
BUN BLDV-MCNC: 8 MG/DL (ref 7–22)
CALCIUM SERPL-MCNC: 8.7 MG/DL (ref 8.5–10.5)
CALCIUM SERPL-MCNC: 8.9 MG/DL (ref 8.5–10.5)
CANNABINOID QUANTITATIVE URINE: NEGATIVE
CASTS 2: ABNORMAL /LPF
CASTS UA: ABNORMAL /LPF
CASTS: ABNORMAL /LPF
CASTS: ABNORMAL /LPF
CHARACTER, URINE: ABNORMAL
CHARACTER, URINE: CLEAR
CHLORIDE BLD-SCNC: 100 MEQ/L (ref 98–111)
CHLORIDE BLD-SCNC: 98 MEQ/L (ref 98–111)
CO2: 23 MEQ/L (ref 23–33)
CO2: 25 MEQ/L (ref 23–33)
COCAINE METABOLITE QUANTITATIVE URINE: NEGATIVE
COLOR: YELLOW
COLOR: YELLOW
CREAT SERPL-MCNC: 0.6 MG/DL (ref 0.4–1.2)
CREAT SERPL-MCNC: 0.6 MG/DL (ref 0.4–1.2)
CRYSTALS, UA: ABNORMAL
CRYSTALS: ABNORMAL
EKG ATRIAL RATE: 79 BPM
EKG P AXIS: 33 DEGREES
EKG P-R INTERVAL: 174 MS
EKG Q-T INTERVAL: 378 MS
EKG QRS DURATION: 92 MS
EKG QTC CALCULATION (BAZETT): 433 MS
EKG R AXIS: 32 DEGREES
EKG T AXIS: 40 DEGREES
EKG VENTRICULAR RATE: 79 BPM
EOSINOPHIL # BLD: 1.6 %
EOSINOPHIL # BLD: 1.7 %
EOSINOPHILS ABSOLUTE: 0.1 THOU/MM3 (ref 0–0.4)
EOSINOPHILS ABSOLUTE: 0.1 THOU/MM3 (ref 0–0.4)
EPITHELIAL CELLS, UA: ABNORMAL /HPF
EPITHELIAL CELLS, UA: ABNORMAL /HPF
ERYTHROCYTE [DISTWIDTH] IN BLOOD BY AUTOMATED COUNT: 12.4 % (ref 11.5–14.5)
ERYTHROCYTE [DISTWIDTH] IN BLOOD BY AUTOMATED COUNT: 12.4 % (ref 11.5–14.5)
ERYTHROCYTE [DISTWIDTH] IN BLOOD BY AUTOMATED COUNT: 38.2 FL (ref 35–45)
ERYTHROCYTE [DISTWIDTH] IN BLOOD BY AUTOMATED COUNT: 38.4 FL (ref 35–45)
GFR SERPL CREATININE-BSD FRML MDRD: > 90 ML/MIN/1.73M2
GFR SERPL CREATININE-BSD FRML MDRD: > 90 ML/MIN/1.73M2
GLUCOSE BLD-MCNC: 294 MG/DL (ref 70–108)
GLUCOSE BLD-MCNC: 539 MG/DL (ref 70–108)
GLUCOSE URINE: >= 1000 MG/DL
GLUCOSE, URINE: >= 1000 MG/DL
HCT VFR BLD CALC: 40.3 % (ref 42–52)
HCT VFR BLD CALC: 42.3 % (ref 42–52)
HEMOGLOBIN: 14.8 GM/DL (ref 14–18)
HEMOGLOBIN: 15.6 GM/DL (ref 14–18)
IMMATURE GRANS (ABS): 0.04 THOU/MM3 (ref 0–0.07)
IMMATURE GRANS (ABS): 0.06 THOU/MM3 (ref 0–0.07)
IMMATURE GRANULOCYTES: 0.7 %
IMMATURE GRANULOCYTES: 1 %
KETONES, URINE: NEGATIVE
KETONES, URINE: NEGATIVE
LACTIC ACID: 2.5 MMOL/L (ref 0.5–2.2)
LEUKOCYTE EST, POC: NEGATIVE
LEUKOCYTE ESTERASE, URINE: NEGATIVE
LIPASE: 36.3 U/L (ref 5.6–51.3)
LYMPHOCYTES # BLD: 34.8 %
LYMPHOCYTES # BLD: 37.5 %
LYMPHOCYTES ABSOLUTE: 2 THOU/MM3 (ref 1–4.8)
LYMPHOCYTES ABSOLUTE: 2.2 THOU/MM3 (ref 1–4.8)
MCH RBC QN AUTO: 31.6 PG (ref 26–33)
MCH RBC QN AUTO: 31.6 PG (ref 26–33)
MCHC RBC AUTO-ENTMCNC: 36.7 GM/DL (ref 32.2–35.5)
MCHC RBC AUTO-ENTMCNC: 36.9 GM/DL (ref 32.2–35.5)
MCV RBC AUTO: 85.8 FL (ref 80–94)
MCV RBC AUTO: 85.9 FL (ref 80–94)
MISCELLANEOUS 2: ABNORMAL
MISCELLANEOUS LAB TEST RESULT: ABNORMAL
MONOCYTES # BLD: 6.6 %
MONOCYTES # BLD: 7 %
MONOCYTES ABSOLUTE: 0.4 THOU/MM3 (ref 0.4–1.3)
MONOCYTES ABSOLUTE: 0.4 THOU/MM3 (ref 0.4–1.3)
NITRITE, URINE: NEGATIVE
NITRITE, URINE: NEGATIVE
NUCLEATED RED BLOOD CELLS: 0 /100 WBC
NUCLEATED RED BLOOD CELLS: 0 /100 WBC
OPIATES, URINE: NEGATIVE
OSMOLALITY CALCULATION: 284.9 MOSMOL/KG (ref 275–300)
OXYCODONE: NEGATIVE
PH UA: 5.5
PH UA: 5.5
PHENCYCLIDINE QUANTITATIVE URINE: NEGATIVE
PLATELET # BLD: 168 THOU/MM3 (ref 130–400)
PLATELET # BLD: 190 THOU/MM3 (ref 130–400)
PMV BLD AUTO: 10.1 FL (ref 9.4–12.4)
PMV BLD AUTO: 10.5 FL (ref 9.4–12.4)
POTASSIUM SERPL-SCNC: 3.7 MEQ/L (ref 3.5–5.2)
POTASSIUM SERPL-SCNC: 4.1 MEQ/L (ref 3.5–5.2)
PROTEIN UA: NEGATIVE
PROTEIN UA: NEGATIVE MG/DL
RBC # BLD: 4.69 MILL/MM3 (ref 4.7–6.1)
RBC # BLD: 4.93 MILL/MM3 (ref 4.7–6.1)
RBC URINE: ABNORMAL /HPF
RBC URINE: ABNORMAL /HPF
RENAL EPITHELIAL, UA: ABNORMAL
RENAL EPITHELIAL, UA: ABNORMAL
SEG NEUTROPHILS: 53.1 %
SEG NEUTROPHILS: 55.3 %
SEGMENTED NEUTROPHILS ABSOLUTE COUNT: 2.9 THOU/MM3 (ref 1.8–7.7)
SEGMENTED NEUTROPHILS ABSOLUTE COUNT: 3.4 THOU/MM3 (ref 1.8–7.7)
SODIUM BLD-SCNC: 133 MEQ/L (ref 135–145)
SODIUM BLD-SCNC: 138 MEQ/L (ref 135–145)
SPECIFIC GRAVITY UA: > 1.03 (ref 1–1.03)
SPECIFIC GRAVITY, URINE: > 1.03 (ref 1–1.03)
TOTAL PROTEIN: 6.3 G/DL (ref 6.1–8)
UROBILINOGEN, URINE: 0.2 EU/DL
UROBILINOGEN, URINE: 0.2 EU/DL
VALPROIC ACID LEVEL: 7.3 UG/ML (ref 50–100)
WBC # BLD: 5.4 THOU/MM3 (ref 4.8–10.8)
WBC # BLD: 6.2 THOU/MM3 (ref 4.8–10.8)
WBC UA: ABNORMAL /HPF
WBC UA: ABNORMAL /HPF
YEAST: ABNORMAL
YEAST: ABNORMAL

## 2018-12-06 PROCEDURE — 81001 URINALYSIS AUTO W/SCOPE: CPT

## 2018-12-06 PROCEDURE — 93010 ELECTROCARDIOGRAM REPORT: CPT | Performed by: NUCLEAR MEDICINE

## 2018-12-06 PROCEDURE — 96374 THER/PROPH/DIAG INJ IV PUSH: CPT

## 2018-12-06 PROCEDURE — S0028 INJECTION, FAMOTIDINE, 20 MG: HCPCS | Performed by: PHYSICIAN ASSISTANT

## 2018-12-06 PROCEDURE — 2580000003 HC RX 258: Performed by: PHYSICIAN ASSISTANT

## 2018-12-06 PROCEDURE — 80307 DRUG TEST PRSMV CHEM ANLYZR: CPT

## 2018-12-06 PROCEDURE — 85025 COMPLETE CBC W/AUTO DIFF WBC: CPT

## 2018-12-06 PROCEDURE — 80053 COMPREHEN METABOLIC PANEL: CPT

## 2018-12-06 PROCEDURE — 83690 ASSAY OF LIPASE: CPT

## 2018-12-06 PROCEDURE — 74177 CT ABD & PELVIS W/CONTRAST: CPT

## 2018-12-06 PROCEDURE — 93005 ELECTROCARDIOGRAM TRACING: CPT | Performed by: PHYSICIAN ASSISTANT

## 2018-12-06 PROCEDURE — 96375 TX/PRO/DX INJ NEW DRUG ADDON: CPT

## 2018-12-06 PROCEDURE — 80164 ASSAY DIPROPYLACETIC ACD TOT: CPT

## 2018-12-06 PROCEDURE — 36415 COLL VENOUS BLD VENIPUNCTURE: CPT

## 2018-12-06 PROCEDURE — 99284 EMERGENCY DEPT VISIT MOD MDM: CPT

## 2018-12-06 PROCEDURE — 83605 ASSAY OF LACTIC ACID: CPT

## 2018-12-06 PROCEDURE — 6360000002 HC RX W HCPCS: Performed by: PHYSICIAN ASSISTANT

## 2018-12-06 PROCEDURE — 2500000003 HC RX 250 WO HCPCS: Performed by: PHYSICIAN ASSISTANT

## 2018-12-06 PROCEDURE — 82150 ASSAY OF AMYLASE: CPT

## 2018-12-06 PROCEDURE — 6360000004 HC RX CONTRAST MEDICATION: Performed by: PHYSICIAN ASSISTANT

## 2018-12-06 PROCEDURE — 80048 BASIC METABOLIC PNL TOTAL CA: CPT

## 2018-12-06 RX ORDER — 0.9 % SODIUM CHLORIDE 0.9 %
1000 INTRAVENOUS SOLUTION INTRAVENOUS ONCE
Status: COMPLETED | OUTPATIENT
Start: 2018-12-06 | End: 2018-12-06

## 2018-12-06 RX ORDER — METOCLOPRAMIDE HYDROCHLORIDE 5 MG/ML
10 INJECTION INTRAMUSCULAR; INTRAVENOUS ONCE
Status: COMPLETED | OUTPATIENT
Start: 2018-12-06 | End: 2018-12-06

## 2018-12-06 RX ORDER — ONDANSETRON 2 MG/ML
4 INJECTION INTRAMUSCULAR; INTRAVENOUS ONCE
Status: DISCONTINUED | OUTPATIENT
Start: 2018-12-06 | End: 2018-12-06

## 2018-12-06 RX ADMIN — IOPAMIDOL 80 ML: 755 INJECTION, SOLUTION INTRAVENOUS at 16:05

## 2018-12-06 RX ADMIN — FAMOTIDINE 20 MG: 10 INJECTION, SOLUTION INTRAVENOUS at 15:34

## 2018-12-06 RX ADMIN — METOCLOPRAMIDE 10 MG: 5 INJECTION, SOLUTION INTRAMUSCULAR; INTRAVENOUS at 15:34

## 2018-12-06 RX ADMIN — SODIUM CHLORIDE 1000 ML: 9 INJECTION, SOLUTION INTRAVENOUS at 15:35

## 2018-12-06 ASSESSMENT — ENCOUNTER SYMPTOMS
ABDOMINAL PAIN: 1
PHOTOPHOBIA: 0
BACK PAIN: 0
COUGH: 0
VOMITING: 1
DIARRHEA: 0
SHORTNESS OF BREATH: 0
SORE THROAT: 0
NAUSEA: 1
RHINORRHEA: 0

## 2018-12-06 ASSESSMENT — PAIN DESCRIPTION - FREQUENCY: FREQUENCY: CONTINUOUS

## 2018-12-06 ASSESSMENT — PAIN DESCRIPTION - DESCRIPTORS: DESCRIPTORS: ACHING

## 2018-12-06 ASSESSMENT — PAIN DESCRIPTION - ORIENTATION: ORIENTATION: UPPER

## 2018-12-06 ASSESSMENT — PAIN SCALES - GENERAL: PAINLEVEL_OUTOF10: 8

## 2018-12-06 ASSESSMENT — PAIN DESCRIPTION - LOCATION
LOCATION: ABDOMEN
LOCATION: ABDOMEN

## 2018-12-06 ASSESSMENT — PAIN DESCRIPTION - PAIN TYPE: TYPE: ACUTE PAIN

## 2018-12-06 NOTE — ED PROVIDER NOTES
[facility] EMERGENCY DEPT      CHIEF COMPLAINT       Chief Complaint   Patient presents with    Abdominal Pain       Nurses Notes reviewed and I agree except as noted in the HPI. HISTORY OF PRESENT ILLNESS    Rafael Jansen is a 50 y.o. male with a past medical history of HTN, HLD, DVT/PE, bipolar disorder, IBS, seizures, RLS, and CAD who presents to the ED for evaluation of abdominal pain. The patient states that he developed severe epigastric abdominal pain today that has been persistent since onset. The patient's pain is a constant sharp sensation that is non-radiating, exacerbated with deep breaths and does not have any alleviating modifying factors. The patient states that he has \"had a lot of abdominal issues\" and is unsure if he has experienced this pain before. The patient states that he has also been vomiting for the past week and a half with a reported approximate 3 episodes per day. The vomiting is intermittent and he has been tolerating fluids and food despite the vomiting. The patient has chronic diarrhea secondary to IBS. No associated fevers or chills. The patient states that for the past several months he has been experiencing intermittent chest pain, dizziness which is described as a lightheaded sensation, and scalp paresthesias. Patient states that his diabetes has been uncontrolled for the past several months. No additional complaints or concerns at the time of initial evaluation. REVIEWOFSYSTEMS     Review of Systems   Constitutional: Negative for appetite change, chills and fever. HENT: Negative for congestion, ear pain, rhinorrhea and sore throat. Eyes: Negative for photophobia. Respiratory: Negative for cough and shortness of breath. Cardiovascular: Negative for chest pain. Gastrointestinal: Positive for abdominal pain, nausea and vomiting. Negative for diarrhea. Endocrine: Negative for polyuria. Genitourinary: Positive for frequency.  Negative for difficulty

## 2018-12-06 NOTE — ED NOTES
Patient updated on plan of care. Call light in reach. resp reg. No further questions or concerns at this time.        Ab Fink RN  12/06/18 [id number]
- - -

## 2018-12-10 ENCOUNTER — TELEPHONE (OUTPATIENT)
Dept: INTERNAL MEDICINE CLINIC | Age: 48
End: 2018-12-10

## 2018-12-10 DIAGNOSIS — R73.9 HYPERGLYCEMIA: Primary | ICD-10-CM

## 2018-12-12 ENCOUNTER — TELEPHONE (OUTPATIENT)
Dept: INTERNAL MEDICINE CLINIC | Age: 48
End: 2018-12-12

## 2018-12-13 ENCOUNTER — OFFICE VISIT (OUTPATIENT)
Dept: INTERNAL MEDICINE CLINIC | Age: 48
End: 2018-12-13
Payer: MEDICARE

## 2018-12-13 VITALS
BODY MASS INDEX: 31.71 KG/M2 | HEART RATE: 88 BPM | WEIGHT: 255 LBS | SYSTOLIC BLOOD PRESSURE: 138 MMHG | DIASTOLIC BLOOD PRESSURE: 100 MMHG | HEIGHT: 75 IN

## 2018-12-13 DIAGNOSIS — R59.1 LYMPHADENOPATHY: ICD-10-CM

## 2018-12-13 DIAGNOSIS — R53.83 FATIGUE, UNSPECIFIED TYPE: ICD-10-CM

## 2018-12-13 DIAGNOSIS — E55.9 VITAMIN D DEFICIENCY: ICD-10-CM

## 2018-12-13 DIAGNOSIS — Z79.4 TYPE 2 DIABETES MELLITUS WITH DIABETIC NEUROPATHY, WITH LONG-TERM CURRENT USE OF INSULIN (HCC): Primary | ICD-10-CM

## 2018-12-13 DIAGNOSIS — E11.40 TYPE 2 DIABETES MELLITUS WITH DIABETIC NEUROPATHY, WITH LONG-TERM CURRENT USE OF INSULIN (HCC): Primary | ICD-10-CM

## 2018-12-13 PROCEDURE — 2022F DILAT RTA XM EVC RTNOPTHY: CPT | Performed by: INTERNAL MEDICINE

## 2018-12-13 PROCEDURE — G8598 ASA/ANTIPLAT THER USED: HCPCS | Performed by: INTERNAL MEDICINE

## 2018-12-13 PROCEDURE — 1036F TOBACCO NON-USER: CPT | Performed by: INTERNAL MEDICINE

## 2018-12-13 PROCEDURE — G8417 CALC BMI ABV UP PARAM F/U: HCPCS | Performed by: INTERNAL MEDICINE

## 2018-12-13 PROCEDURE — G0444 DEPRESSION SCREEN ANNUAL: HCPCS | Performed by: INTERNAL MEDICINE

## 2018-12-13 PROCEDURE — 99214 OFFICE O/P EST MOD 30 MIN: CPT | Performed by: INTERNAL MEDICINE

## 2018-12-13 PROCEDURE — 3046F HEMOGLOBIN A1C LEVEL >9.0%: CPT | Performed by: INTERNAL MEDICINE

## 2018-12-13 PROCEDURE — G8484 FLU IMMUNIZE NO ADMIN: HCPCS | Performed by: INTERNAL MEDICINE

## 2018-12-13 PROCEDURE — G8427 DOCREV CUR MEDS BY ELIG CLIN: HCPCS | Performed by: INTERNAL MEDICINE

## 2018-12-13 RX ORDER — CLINDAMYCIN HYDROCHLORIDE 300 MG/1
300 CAPSULE ORAL 3 TIMES DAILY
Qty: 30 CAPSULE | Refills: 0 | Status: SHIPPED | OUTPATIENT
Start: 2018-12-13 | End: 2018-12-23

## 2018-12-13 ASSESSMENT — PATIENT HEALTH QUESTIONNAIRE - PHQ9
9. THOUGHTS THAT YOU WOULD BE BETTER OFF DEAD, OR OF HURTING YOURSELF: 0
SUM OF ALL RESPONSES TO PHQ QUESTIONS 1-9: 15
4. FEELING TIRED OR HAVING LITTLE ENERGY: 3
SUM OF ALL RESPONSES TO PHQ9 QUESTIONS 1 & 2: 3
6. FEELING BAD ABOUT YOURSELF - OR THAT YOU ARE A FAILURE OR HAVE LET YOURSELF OR YOUR FAMILY DOWN: 3
2. FEELING DOWN, DEPRESSED OR HOPELESS: 1
1. LITTLE INTEREST OR PLEASURE IN DOING THINGS: 2
8. MOVING OR SPEAKING SO SLOWLY THAT OTHER PEOPLE COULD HAVE NOTICED. OR THE OPPOSITE, BEING SO FIGETY OR RESTLESS THAT YOU HAVE BEEN MOVING AROUND A LOT MORE THAN USUAL: 0
10. IF YOU CHECKED OFF ANY PROBLEMS, HOW DIFFICULT HAVE THESE PROBLEMS MADE IT FOR YOU TO DO YOUR WORK, TAKE CARE OF THINGS AT HOME, OR GET ALONG WITH OTHER PEOPLE: 1
7. TROUBLE CONCENTRATING ON THINGS, SUCH AS READING THE NEWSPAPER OR WATCHING TELEVISION: 0
SUM OF ALL RESPONSES TO PHQ QUESTIONS 1-9: 15
3. TROUBLE FALLING OR STAYING ASLEEP: 3
5. POOR APPETITE OR OVEREATING: 3

## 2018-12-13 NOTE — PROGRESS NOTES
and no answer. He did not follow through with gastric emptying study ordered in hospital.  He had CT abd in ER and negative. Need better control of DM. Continue PPI. Vitamin D deficiency - on 6000 IU daily and level 20 4/2018. Increased to 4000 IU BID. Check level now. Discussed at last visit:  DM - issues with high and low FSBS. He needs to be more consistent with eating. Increase Levemir to 50 Units at 11 pm.     If you are taking SSI at noon and not eating lunch - sometime you drop low at 5-6pm = suggest at least eating eating peanut butter and crackers in afternoon. He is complaining of episodic chest pain with SOB, diaphoresis, tachycardia that lasts 2-20 minutes up to 12 x a day. When it happens it hurts to take deep breath - but not present when no chest pain. He had an episode during my last visit - no pain to palpation of chest.  Positive diaphoresis, tachycardia, but also at time had a sugar of 72. Given glucose tab and crackers. He was given a stress test in ER which was unremarkable. For the tachycardia - at last visit, increased metoprolol and added prednisone for possible pleurisy and abnormal CT. Chest pain still an issue - set up ECHO and visit with  [facility] and see if he thinks could be due to bridging of LAD. Reviewed CT results - ground glass opacities resolved after steroids. He has GI appt to discus abdominal pain with eating and is taking pancreatic enzyme the last 1 month - stools and pain not better. To see GI in June.       Past Medical History:   Diagnosis Date    Acid reflux     Anemia     previously seen by Dr. Xochitl Moreno (due to enlarged spleen)    Bipolar disorder ([facility]     Blood clot in vein 4/7/16    Leata Fret     Chest pain     previously seeing [facility] cardiologist, now seeing  [facility] (LAD bridging on cath 4/2016)    Chronic back pain     Chronic bronchitis ([facility] [address]     Dr. Nicole Greenfield 10/2012 - no longer following with him    Colon polyp 08/30/2016  Depression     Dr. Daily Payton DVT (deep venous thrombosis) ([facility] [address] [id number]    not on Coumadin due to unable to regulate - Dr. Jennifer Carter - no etiology found per patient    Esophageal abnormality     nodule     Fatty liver disease, nonalcoholic     U/S [id number]/5070 [facility]    Furuncle     legs    History of Doppler ultrasound 5-29-11    No hemodynamically significant carotid stenosis is identified. A thyroid nodule on each side. Dedicated ultrasound of thyroid gland is suggested to further evaluate if clinically indicated.      Hx of blood clots     Hyperlipidemia     severely elevated triglycerides    Hypertension     diastolic    Intracranial arachnoid cyst 11/2012    Dr. Estefany Abreu drained, complicated with seizures, DKA    Irritable bowel syndrome     Liver disease     Kendall Wattsdede - elevated LFT - positive smooth muscle antibody, steatosis per liver bx 3/2014 with Dr. Dimitris Holm (multiple drug resistant organisms) resistance 2012    MRSA (methicillin resistant staph aureus) culture positive     h/o in foot and before brain surgery    Nephrolithiasis     noted on CT abdomen 9/2016    Neuropathy     Pancreatic insufficiency     Positive SANDY (antinuclear antibody)     Dr. Mireya Shultz - first visit in 6/2013    Pulmonary embolism ([facility] [address] 2007    s/p GFF, related to knee surgery    Restless legs syndrome     Seizures ([address]     Sinus tachycardia     Holter 3/2013 - seeing Dr. Neli Garg fracture [facility])     clips     Sleep apnea     multiple MD's suggested testing but he refuses to be tested as claustraphobic and won't use Cpap    Type II or unspecified type diabetes mellitus without mention of complication, not stated as uncontrolled 2007       Past Surgical History:   Procedure Laterality Date    CARDIAC CATHETERIZATION      2011,5-6 years ago   [address]  3-2012   [address]  04/28/2016    [address] RELEASE  01/2017    CHOLECYSTECTOMY  2004    COLONOSCOPY  2012  COLONOSCOPY  08/2016    2 tubular adenomas - reportedly needs repeat scope in 6 months    CRANIOTOMY  11/2012    arachnoid cyst drainage    EKG 12-LEAD  10/18/2015         ENDOSCOPY, COLON, DIAGNOSTIC      HERNIA REPAIR Right 1996    [facility]--Dr. Denis Seals INGUINAL HERNIA REPAIR Right 09/15/2016    Robotic assisted    KNEE ARTHROSCOPY Right 2016    KNEE SURGERY Left 2007    acl and debrided twice    OTHER SURGICAL HISTORY  5-31-11    Tilt table was associated w/ nonspecific symptoms or nausea, otherwise no significant dizziness or syncope. No significant hemodynamic changes. Otherwise, unremarkable tilt table test after 30 minutes of tilting.  OTHER SURGICAL HISTORY  01/20/2017    RIGHT SHOULDER ARTHROSCOPY, OPEN STAN, OPEN ACROMIOPLASTY, BICEP TENDESIS, RIGHT CARPAL TUNNEL RELEASE    SHOULDER SURGERY Right 01/2007    TONSILLECTOMY      TRANSTHORACIC ECHOCARDIOGRAM  3-05-11    LV size and systolic function normal. EF 55-65%.      UPPER GASTROINTESTINAL ENDOSCOPY  2012    UPPER GASTROINTESTINAL ENDOSCOPY  2016    Dr. Felice Angelo  2007       Current Outpatient Prescriptions   Medication Sig Dispense Refill    clindamycin (CLEOCIN) 300 MG capsule Take 1 capsule by mouth 3 times daily for 10 days 30 capsule 0    DULoxetine (CYMBALTA) 30 MG extended release capsule TAKE 1 CAPSULE BY MOUTH ONE TIME A DAY  2    traZODone (DESYREL) 100 MG tablet TAKE 1/2 TABLET BY MOUTH AT BEDTIME AS NEEDED for sleep  2    lurasidone (LATUDA) 40 MG TABS tablet Take 40 mg by mouth daily      canagliflozin (INVOKANA) 300 MG TABS tablet TAKE 1 TABLET BY MOUTH IN THE MORNING BEFORE breakfast 90 tablet 1    insulin aspart (NOVOLOG FLEXPEN) 100 UNIT/ML injection pen Inject 30 Units into the skin 3 times daily (before meals) Plus sliding scale 4:50 > 150 mg/dl 30 pen 1    insulin detemir (LEVEMIR FLEXTOUCH) 100 UNIT/ML injection pen 70 units every AM and 45 units every PM (Patient taking differently: 75 units every AM and 45 units every PM) 30 pen 1    rOPINIRole (REQUIP) 1 MG tablet TAKE 1 TABLET BY MOUTH THREE TIMES A  tablet 1    blood glucose test strips (ONE TOUCH TEST STRIPS) strip Use to test blood glucose level 4 times per day. Diagnosis: E11.40 450 each 1    atorvastatin (LIPITOR) 10 MG tablet TAKE 1 TABLET BY MOUTH ONE TIME A DAY 30 tablet 5    gabapentin (NEURONTIN) 600 MG tablet Take 1 tablet by mouth 4 times daily for 180 days. . 360 tablet 1    pantoprazole (PROTONIX) 40 MG tablet Take 1 tablet by mouth 2 times daily 180 tablet 1    fenofibrate (TRICOR) 145 MG tablet Take 1 tablet by mouth daily 90 tablet 1    metoprolol tartrate (LOPRESSOR) 50 MG tablet Take 1 tablet by mouth 2 times daily (Patient taking differently: Take 75 mg by mouth 2 times daily ) 180 tablet 1    divalproex (DEPAKOTE) 500 MG DR tablet Take 500 mg by mouth 2 times daily       sildenafil (VIAGRA) 100 MG tablet Take 1 tablet by mouth as needed for Erectile Dysfunction 45 tablet 3    lacosamide (VIMPAT) 50 MG TABS tablet Take 250 mg by mouth 2 times daily. Takes with 100 mg to total 250 mg bid.  Blood Glucose Monitoring Suppl (ONE TOUCH BASIC SYSTEM) W/DEVICE KIT Use to test blood glucose level 4 times per day. Diagnosis: E11.40 1 kit 0    clindamycin (CLEOCIN T) 1 % external solution Apply topically 2 times daily.  (Patient taking differently: as needed Apply topically 2 times daily. ) 30 mL 5    levETIRAcetam (KEPPRA) 500 MG tablet Take 2,000 mg by mouth 2 times daily       LORazepam (ATIVAN) 0.5 MG tablet Take 0.5 mg by mouth 2 times daily as needed (seizures)       zonisamide (ZONEGRAN) 100 MG capsule Take 500 mg by mouth nightly       potassium chloride (KLOR-CON M) 20 MEQ extended release tablet Take 2 tablets by mouth once for 1 dose 2 tablet 0    omega-3 acid ethyl esters (LOVAZA) 1 g capsule Take 1 capsule by mouth 2 times daily 60 capsule 5    Pancrelipase, Lip-Prot-Amyl, (CREON) anxiety (GI and cardiology aware and completed work-up in past) - patient states current chest pain is not different, no dyspnea on exertion, tolerates vacuuming  Gastrointestinal ROS: no abdominal pain, change in bowel habits, or black or bloody stools, positive nausea, intermittent vomiting  Genito-Urinary ROS: no dysuria, trouble voiding, or hematuria - increased frequency, urgency  Neurological ROS: negative for - headaches, or weakness, positive numbness/pain in feet/legs, and seizures  Dermatological ROS: negative for - rash or skin lesion changes, except chronic rash on legs    Blood pressure (!) 138/100, pulse 88, height 6' 3\" (1.905 m), weight 255 lb (115.7 kg). Physical Examination: General appearance -alert, well appearing, and in no distress  Mental status - alert, oriented to person, place, and time  Neck - supple, no significant adenopathy  Chest - clear to auscultation, no wheezes, rales or rhonchi, symmetric air entry  Heart - normal rate, regular rhythm, normal S1, S2, no murmurs, rubs, clicks or gallops  Abdomen -soft, nontender, nondistended  Extremities - peripheral pulses normal, no pedal edema, no clubbing or cyanosis  Neurological - alert, oriented  Skin - warm and dry    Foot exam 4/9/18: no foot lesions, sensation decreased to level of ankles with monofilament testing.  +2 DP and PT pulses bilaterally. Diagnostic Data:  Reviewed labs from 10-12/2018 with patient. Assessment and Plan:     Diagnosis Orders   1. Type 2 diabetes mellitus with diabetic neuropathy, with long-term current use of insulin ([facility])  Microalbumin / Creatinine Urine Ratio    Vitamin B12 & Folate   2. Fatigue, unspecified type  Vitamin B12 & Folate   3. Vitamin D deficiency  Vitamin D 25 Hydroxy    Vitamin B12 & Folate   4. Lymphadenopathy  clindamycin (CLEOCIN) 300 MG capsule     DM - not controlled, on insulin - increase Lantus, continue SSI.   He is to work on eating every 4 hours - just small amounts, take

## 2018-12-20 ENCOUNTER — TELEPHONE (OUTPATIENT)
Dept: INTERNAL MEDICINE CLINIC | Age: 48
End: 2018-12-20

## 2018-12-20 NOTE — TELEPHONE ENCOUNTER
Patient called stating that when he saw Dr Wanda Trevino on 12/13/18, she mentioned wanting to some follow up test regarding his lymph nodes and thyroid nodules and that someone from the office would be calling him with details. I do not see this mentioned anywhere in his chart. Is patient supposed to get additional testing done?

## 2018-12-21 NOTE — TELEPHONE ENCOUNTER
I have a call out to Dr. Marcella Hong to review CT. Ask Hector if tender spot under chin any better with antibiotic? Extend antibiotic 7 more days to give us more time to set up follow up testing.

## 2018-12-27 ENCOUNTER — HOSPITAL ENCOUNTER (OUTPATIENT)
Age: 48
Discharge: HOME | End: 2018-12-27
Payer: MEDICARE

## 2018-12-27 DIAGNOSIS — R53.83 FATIGUE, UNSPECIFIED TYPE: ICD-10-CM

## 2018-12-27 DIAGNOSIS — E11.40 TYPE 2 DIABETES MELLITUS WITH DIABETIC NEUROPATHY, WITH LONG-TERM CURRENT USE OF INSULIN (HCC): ICD-10-CM

## 2018-12-27 DIAGNOSIS — Z79.4 TYPE 2 DIABETES MELLITUS WITH DIABETIC NEUROPATHY, WITH LONG-TERM CURRENT USE OF INSULIN (HCC): ICD-10-CM

## 2018-12-27 DIAGNOSIS — E55.9 VITAMIN D DEFICIENCY: ICD-10-CM

## 2018-12-27 LAB
CREATININE, URINE: 105.1 MG/DL
FOLATE: 9.9 NG/ML (ref 4.8–24.2)
MICROALBUMIN UR-MCNC: < 1.2 MG/DL
MICROALBUMIN/CREAT UR-RTO: 11 MG/G (ref 0–30)
VITAMIN B-12: 233 PG/ML (ref 211–911)
VITAMIN D 25-HYDROXY: 13 NG/ML (ref 30–100)

## 2018-12-27 PROCEDURE — 82533 TOTAL CORTISOL: CPT

## 2018-12-27 PROCEDURE — 82043 UR ALBUMIN QUANTITATIVE: CPT

## 2018-12-27 PROCEDURE — 82306 VITAMIN D 25 HYDROXY: CPT

## 2018-12-27 PROCEDURE — 82607 VITAMIN B-12: CPT

## 2018-12-27 PROCEDURE — 36415 COLL VENOUS BLD VENIPUNCTURE: CPT

## 2018-12-27 PROCEDURE — 82746 ASSAY OF FOLIC ACID SERUM: CPT

## 2018-12-28 ENCOUNTER — HOSPITAL ENCOUNTER (OUTPATIENT)
Age: 48
Setting detail: SPECIMEN
Discharge: HOME | End: 2018-12-28
Payer: MEDICARE

## 2018-12-28 DIAGNOSIS — R73.9 HYPERGLYCEMIA: ICD-10-CM

## 2018-12-28 DIAGNOSIS — E04.1 THYROID NODULE: Primary | ICD-10-CM

## 2018-12-28 DIAGNOSIS — R59.1 LYMPHADENOPATHY: ICD-10-CM

## 2018-12-28 NOTE — TELEPHONE ENCOUNTER
I spoke with patient. He is going to send FBS through [facility]. I confirmed insulin doses with him and our med list is correct.

## 2018-12-30 LAB — MISC. #1 REFERENCE GROUP TEST: NORMAL

## 2018-12-31 ENCOUNTER — HOSPITAL ENCOUNTER (OUTPATIENT)
Dept: ULTRASOUND IMAGING | Age: 48
Discharge: HOME | End: 2018-12-31
Payer: MEDICARE

## 2018-12-31 DIAGNOSIS — R59.1 LYMPHADENOPATHY: ICD-10-CM

## 2018-12-31 DIAGNOSIS — E04.1 THYROID NODULE: ICD-10-CM

## 2018-12-31 PROCEDURE — 76536 US EXAM OF HEAD AND NECK: CPT

## 2019-01-02 ENCOUNTER — PATIENT MESSAGE (OUTPATIENT)
Dept: INTERNAL MEDICINE CLINIC | Age: 49
End: 2019-01-02

## 2019-01-02 DIAGNOSIS — E04.2 MULTIPLE THYROID NODULES: Primary | ICD-10-CM

## 2019-01-03 ENCOUNTER — TELEPHONE (OUTPATIENT)
Dept: INTERNAL MEDICINE CLINIC | Age: 49
End: 2019-01-03

## 2019-01-04 ENCOUNTER — HOSPITAL ENCOUNTER (OUTPATIENT)
Dept: ULTRASOUND IMAGING | Age: 49
Discharge: HOME | End: 2019-01-04
Payer: MEDICARE

## 2019-01-04 DIAGNOSIS — E04.2 MULTIPLE THYROID NODULES: ICD-10-CM

## 2019-01-04 PROCEDURE — 88172 CYTP DX EVAL FNA 1ST EA SITE: CPT

## 2019-01-04 PROCEDURE — 88173 CYTOPATH EVAL FNA REPORT: CPT

## 2019-01-04 PROCEDURE — 88177 CYTP FNA EVAL EA ADDL: CPT

## 2019-01-04 PROCEDURE — 76942 ECHO GUIDE FOR BIOPSY: CPT

## 2019-01-05 ENCOUNTER — NURSE TRIAGE (OUTPATIENT)
Dept: ADMINISTRATIVE | Age: 49
End: 2019-01-05

## 2019-01-10 ENCOUNTER — OFFICE VISIT (OUTPATIENT)
Dept: INTERNAL MEDICINE CLINIC | Age: 49
End: 2019-01-10
Payer: MEDICARE

## 2019-01-10 VITALS
BODY MASS INDEX: 31.76 KG/M2 | DIASTOLIC BLOOD PRESSURE: 94 MMHG | HEART RATE: 80 BPM | WEIGHT: 247.5 LBS | SYSTOLIC BLOOD PRESSURE: 134 MMHG | HEIGHT: 74 IN

## 2019-01-10 DIAGNOSIS — E11.40 TYPE 2 DIABETES MELLITUS WITH DIABETIC NEUROPATHY, WITH LONG-TERM CURRENT USE OF INSULIN (HCC): ICD-10-CM

## 2019-01-10 DIAGNOSIS — E04.1 THYROID NODULE: Primary | ICD-10-CM

## 2019-01-10 DIAGNOSIS — R89.9 ABNORMAL LABORATORY TEST: ICD-10-CM

## 2019-01-10 DIAGNOSIS — Z79.4 TYPE 2 DIABETES MELLITUS WITH DIABETIC NEUROPATHY, WITH LONG-TERM CURRENT USE OF INSULIN (HCC): ICD-10-CM

## 2019-01-10 DIAGNOSIS — R59.1 LYMPHADENOPATHY: ICD-10-CM

## 2019-01-10 PROCEDURE — G8484 FLU IMMUNIZE NO ADMIN: HCPCS | Performed by: INTERNAL MEDICINE

## 2019-01-10 PROCEDURE — 1036F TOBACCO NON-USER: CPT | Performed by: INTERNAL MEDICINE

## 2019-01-10 PROCEDURE — G8427 DOCREV CUR MEDS BY ELIG CLIN: HCPCS | Performed by: INTERNAL MEDICINE

## 2019-01-10 PROCEDURE — 99213 OFFICE O/P EST LOW 20 MIN: CPT | Performed by: INTERNAL MEDICINE

## 2019-01-10 PROCEDURE — 3046F HEMOGLOBIN A1C LEVEL >9.0%: CPT | Performed by: INTERNAL MEDICINE

## 2019-01-10 PROCEDURE — G8599 NO ASA/ANTIPLAT THER USE RNG: HCPCS | Performed by: INTERNAL MEDICINE

## 2019-01-10 PROCEDURE — 2022F DILAT RTA XM EVC RTNOPTHY: CPT | Performed by: INTERNAL MEDICINE

## 2019-01-10 PROCEDURE — G8417 CALC BMI ABV UP PARAM F/U: HCPCS | Performed by: INTERNAL MEDICINE

## 2019-01-14 ENCOUNTER — TELEPHONE (OUTPATIENT)
Dept: INTERNAL MEDICINE CLINIC | Age: 49
End: 2019-01-14

## 2019-01-22 ENCOUNTER — OFFICE VISIT (OUTPATIENT)
Dept: PULMONOLOGY | Age: 49
End: 2019-01-22
Payer: MEDICARE

## 2019-01-22 VITALS
TEMPERATURE: 99.1 F | BODY MASS INDEX: 32.42 KG/M2 | WEIGHT: 252.6 LBS | SYSTOLIC BLOOD PRESSURE: 130 MMHG | OXYGEN SATURATION: 96 % | DIASTOLIC BLOOD PRESSURE: 84 MMHG | HEIGHT: 74 IN | RESPIRATION RATE: 18 BRPM | HEART RATE: 94 BPM

## 2019-01-22 DIAGNOSIS — Z79.4 TYPE 2 DIABETES MELLITUS WITH DIABETIC NEUROPATHY, WITH LONG-TERM CURRENT USE OF INSULIN (HCC): ICD-10-CM

## 2019-01-22 DIAGNOSIS — R07.9 CHRONIC CHEST PAIN: ICD-10-CM

## 2019-01-22 DIAGNOSIS — R94.2 ABNORMAL PFT: ICD-10-CM

## 2019-01-22 DIAGNOSIS — G89.29 CHRONIC CHEST PAIN: ICD-10-CM

## 2019-01-22 DIAGNOSIS — Q24.5 MYOCARDIAL BRIDGE: ICD-10-CM

## 2019-01-22 DIAGNOSIS — E11.40 TYPE 2 DIABETES MELLITUS WITH DIABETIC NEUROPATHY, WITH LONG-TERM CURRENT USE OF INSULIN (HCC): ICD-10-CM

## 2019-01-22 DIAGNOSIS — E04.2 MULTIPLE THYROID NODULES: ICD-10-CM

## 2019-01-22 DIAGNOSIS — R06.09 DOE (DYSPNEA ON EXERTION): Primary | ICD-10-CM

## 2019-01-22 PROCEDURE — 99214 OFFICE O/P EST MOD 30 MIN: CPT | Performed by: NURSE PRACTITIONER

## 2019-01-22 PROCEDURE — 1036F TOBACCO NON-USER: CPT | Performed by: NURSE PRACTITIONER

## 2019-01-22 PROCEDURE — G8427 DOCREV CUR MEDS BY ELIG CLIN: HCPCS | Performed by: NURSE PRACTITIONER

## 2019-01-22 PROCEDURE — G8417 CALC BMI ABV UP PARAM F/U: HCPCS | Performed by: NURSE PRACTITIONER

## 2019-01-22 PROCEDURE — G8599 NO ASA/ANTIPLAT THER USE RNG: HCPCS | Performed by: NURSE PRACTITIONER

## 2019-01-22 PROCEDURE — G8484 FLU IMMUNIZE NO ADMIN: HCPCS | Performed by: NURSE PRACTITIONER

## 2019-01-23 ENCOUNTER — TELEPHONE (OUTPATIENT)
Dept: PULMONOLOGY | Age: 49
End: 2019-01-23

## 2019-01-24 DIAGNOSIS — R09.02 HYPOXIA: Primary | ICD-10-CM

## 2019-01-24 DIAGNOSIS — R09.02 HYPOXIA: ICD-10-CM

## 2019-01-24 NOTE — DISCHARGE SUMMARY
PULMONOLOGY      Discharge Instructions     None    This patient was not billed for this discharge note. Signed: Thank you Mago Pierson MD for the opportunity to be involved in this patient's care.     Electronically signed by Verona Guzman MD on 10/17/2018 at 11:44 AM Complex Repair And V-Y Plasty Text: The defect edges were debeveled with a #15 scalpel blade.  The primary defect was closed partially with a complex linear closure.  Given the location of the remaining defect, shape of the defect and the proximity to free margins a V-Y plasty was deemed most appropriate for complete closure of the defect.  Using a sterile surgical marker, an appropriate advancement flap was drawn incorporating the defect and placing the expected incisions within the relaxed skin tension lines where possible.    The area thus outlined was incised deep to adipose tissue with a #15 scalpel blade.  The skin margins were undermined to an appropriate distance in all directions utilizing iris scissors.

## 2019-01-29 ENCOUNTER — OFFICE VISIT (OUTPATIENT)
Dept: INTERNAL MEDICINE CLINIC | Age: 49
End: 2019-01-29
Payer: MEDICARE

## 2019-01-29 VITALS
DIASTOLIC BLOOD PRESSURE: 74 MMHG | HEART RATE: 88 BPM | BODY MASS INDEX: 31.95 KG/M2 | HEIGHT: 74 IN | SYSTOLIC BLOOD PRESSURE: 128 MMHG | WEIGHT: 249 LBS

## 2019-01-29 DIAGNOSIS — G25.81 RESTLESS LEGS SYNDROME (RLS): ICD-10-CM

## 2019-01-29 DIAGNOSIS — E53.8 VITAMIN B 12 DEFICIENCY: ICD-10-CM

## 2019-01-29 DIAGNOSIS — E78.2 MIXED HYPERLIPIDEMIA: ICD-10-CM

## 2019-01-29 DIAGNOSIS — E11.40 TYPE 2 DIABETES MELLITUS WITH DIABETIC NEUROPATHY, WITH LONG-TERM CURRENT USE OF INSULIN (HCC): ICD-10-CM

## 2019-01-29 DIAGNOSIS — E78.5 HYPERLIPIDEMIA WITH TARGET LDL LESS THAN 100: ICD-10-CM

## 2019-01-29 DIAGNOSIS — G62.9 NEUROPATHY: ICD-10-CM

## 2019-01-29 DIAGNOSIS — E55.9 VITAMIN D DEFICIENCY: ICD-10-CM

## 2019-01-29 DIAGNOSIS — N52.9 ERECTILE DYSFUNCTION, UNSPECIFIED ERECTILE DYSFUNCTION TYPE: ICD-10-CM

## 2019-01-29 DIAGNOSIS — K21.9 GASTROESOPHAGEAL REFLUX DISEASE WITHOUT ESOPHAGITIS: ICD-10-CM

## 2019-01-29 DIAGNOSIS — Z79.4 TYPE 2 DIABETES MELLITUS WITH DIABETIC NEUROPATHY, WITH LONG-TERM CURRENT USE OF INSULIN (HCC): ICD-10-CM

## 2019-01-29 DIAGNOSIS — J20.9 ACUTE BRONCHITIS, UNSPECIFIED ORGANISM: Primary | ICD-10-CM

## 2019-01-29 PROCEDURE — G8417 CALC BMI ABV UP PARAM F/U: HCPCS | Performed by: INTERNAL MEDICINE

## 2019-01-29 PROCEDURE — 3046F HEMOGLOBIN A1C LEVEL >9.0%: CPT | Performed by: INTERNAL MEDICINE

## 2019-01-29 PROCEDURE — 1036F TOBACCO NON-USER: CPT | Performed by: INTERNAL MEDICINE

## 2019-01-29 PROCEDURE — 2022F DILAT RTA XM EVC RTNOPTHY: CPT | Performed by: INTERNAL MEDICINE

## 2019-01-29 PROCEDURE — 99213 OFFICE O/P EST LOW 20 MIN: CPT | Performed by: INTERNAL MEDICINE

## 2019-01-29 PROCEDURE — G8599 NO ASA/ANTIPLAT THER USE RNG: HCPCS | Performed by: INTERNAL MEDICINE

## 2019-01-29 PROCEDURE — G8484 FLU IMMUNIZE NO ADMIN: HCPCS | Performed by: INTERNAL MEDICINE

## 2019-01-29 PROCEDURE — G8427 DOCREV CUR MEDS BY ELIG CLIN: HCPCS | Performed by: INTERNAL MEDICINE

## 2019-01-29 RX ORDER — ROPINIROLE 1 MG/1
TABLET, FILM COATED ORAL
Qty: 270 TABLET | Refills: 1 | Status: SHIPPED | OUTPATIENT
Start: 2019-01-29 | End: 2019-04-16 | Stop reason: SDUPTHER

## 2019-01-29 RX ORDER — FENOFIBRATE 145 MG/1
145 TABLET, COATED ORAL DAILY
Qty: 90 TABLET | Refills: 1 | Status: SHIPPED | OUTPATIENT
Start: 2019-01-29 | End: 2019-04-16 | Stop reason: SDUPTHER

## 2019-01-29 RX ORDER — AMOXICILLIN AND CLAVULANATE POTASSIUM 875; 125 MG/1; MG/1
1 TABLET, FILM COATED ORAL 2 TIMES DAILY
Qty: 28 TABLET | Refills: 0 | Status: SHIPPED | OUTPATIENT
Start: 2019-01-29 | End: 2019-02-12

## 2019-01-29 RX ORDER — ALBUTEROL SULFATE 90 UG/1
2 AEROSOL, METERED RESPIRATORY (INHALATION) EVERY 4 HOURS PRN
Qty: 1 INHALER | Refills: 0 | Status: SHIPPED | OUTPATIENT
Start: 2019-01-29 | End: 2019-05-16

## 2019-01-29 RX ORDER — SILDENAFIL 100 MG/1
100 TABLET, FILM COATED ORAL PRN
Qty: 45 TABLET | Refills: 3 | Status: ON HOLD | OUTPATIENT
Start: 2019-01-29 | End: 2022-06-14 | Stop reason: HOSPADM

## 2019-01-29 RX ORDER — PANTOPRAZOLE SODIUM 40 MG/1
40 TABLET, DELAYED RELEASE ORAL 2 TIMES DAILY
Qty: 180 TABLET | Refills: 1 | Status: SHIPPED | OUTPATIENT
Start: 2019-01-29 | End: 2019-04-16 | Stop reason: SDUPTHER

## 2019-01-29 RX ORDER — GABAPENTIN 600 MG/1
600 TABLET ORAL 4 TIMES DAILY
Qty: 360 TABLET | Refills: 1 | Status: SHIPPED | OUTPATIENT
Start: 2019-01-29 | End: 2019-05-28 | Stop reason: SDUPTHER

## 2019-01-29 RX ORDER — CYANOCOBALAMIN 1000 UG/ML
1000 INJECTION INTRAMUSCULAR; SUBCUTANEOUS ONCE
Qty: 1 ML | Refills: 0 | Status: CANCELLED | OUTPATIENT
Start: 2019-01-29 | End: 2019-01-29

## 2019-01-29 RX ORDER — ERGOCALCIFEROL 1.25 MG/1
50000 CAPSULE ORAL WEEKLY
Qty: 12 CAPSULE | Refills: 1 | Status: SHIPPED | OUTPATIENT
Start: 2019-01-29 | End: 2019-04-16 | Stop reason: SDUPTHER

## 2019-01-29 RX ORDER — ATORVASTATIN CALCIUM 10 MG/1
TABLET, FILM COATED ORAL
Qty: 30 TABLET | Refills: 5 | Status: SHIPPED | OUTPATIENT
Start: 2019-01-29 | End: 2019-04-16 | Stop reason: SDUPTHER

## 2019-01-30 RX ORDER — CYANOCOBALAMIN 1000 UG/ML
INJECTION INTRAMUSCULAR; SUBCUTANEOUS
Qty: 25 ML | Refills: 1 | OUTPATIENT
Start: 2019-01-30 | End: 2019-04-16

## 2019-02-01 ENCOUNTER — INITIAL CONSULT (OUTPATIENT)
Dept: NEUROLOGY | Age: 49
End: 2019-02-01
Payer: MEDICARE

## 2019-02-01 VITALS
WEIGHT: 251.4 LBS | DIASTOLIC BLOOD PRESSURE: 80 MMHG | HEART RATE: 100 BPM | HEIGHT: 74 IN | SYSTOLIC BLOOD PRESSURE: 128 MMHG | BODY MASS INDEX: 32.26 KG/M2

## 2019-02-01 DIAGNOSIS — M62.81 MUSCLE WEAKNESS: Primary | ICD-10-CM

## 2019-02-01 DIAGNOSIS — R29.898 WEAKNESS OF BOTH HANDS: ICD-10-CM

## 2019-02-01 DIAGNOSIS — G40.909 SEIZURE DISORDER (HCC): ICD-10-CM

## 2019-02-01 DIAGNOSIS — G62.9 NEUROPATHY: ICD-10-CM

## 2019-02-01 PROCEDURE — G8484 FLU IMMUNIZE NO ADMIN: HCPCS | Performed by: PSYCHIATRY & NEUROLOGY

## 2019-02-01 PROCEDURE — 99204 OFFICE O/P NEW MOD 45 MIN: CPT | Performed by: PSYCHIATRY & NEUROLOGY

## 2019-02-01 PROCEDURE — G8599 NO ASA/ANTIPLAT THER USE RNG: HCPCS | Performed by: PSYCHIATRY & NEUROLOGY

## 2019-02-01 PROCEDURE — G8417 CALC BMI ABV UP PARAM F/U: HCPCS | Performed by: PSYCHIATRY & NEUROLOGY

## 2019-02-01 PROCEDURE — 1036F TOBACCO NON-USER: CPT | Performed by: PSYCHIATRY & NEUROLOGY

## 2019-02-01 PROCEDURE — G8427 DOCREV CUR MEDS BY ELIG CLIN: HCPCS | Performed by: PSYCHIATRY & NEUROLOGY

## 2019-02-15 LAB
CREAT SERPL-MCNC: 0.8 MG/DL (ref 0.6–1.3)
CREATININE CLEARANCE: 60

## 2019-02-19 ENCOUNTER — TELEPHONE (OUTPATIENT)
Dept: NEUROLOGY | Age: 49
End: 2019-02-19

## 2019-03-01 ENCOUNTER — TELEPHONE (OUTPATIENT)
Dept: PULMONOLOGY | Age: 49
End: 2019-03-01

## 2019-03-04 ENCOUNTER — CLINICAL DOCUMENTATION (OUTPATIENT)
Dept: PULMONOLOGY | Age: 49
End: 2019-03-04

## 2019-03-21 ENCOUNTER — TELEPHONE (OUTPATIENT)
Dept: NEUROLOGY | Age: 49
End: 2019-03-21

## 2019-03-21 ENCOUNTER — HOSPITAL ENCOUNTER (OUTPATIENT)
Age: 49
Discharge: HOME | End: 2019-03-21
Payer: MEDICARE

## 2019-03-21 DIAGNOSIS — G40.909 SEIZURE DISORDER (HCC): Primary | ICD-10-CM

## 2019-03-21 LAB
SEDIMENTATION RATE, ERYTHROCYTE: 2 MM/HR (ref 0–10)
TOTAL CK: 98 U/L (ref 55–170)
VALPROIC ACID LEVEL: 17.7 UG/ML (ref 50–100)

## 2019-03-21 PROCEDURE — 36415 COLL VENOUS BLD VENIPUNCTURE: CPT

## 2019-03-21 PROCEDURE — 82530 CORTISOL FREE: CPT

## 2019-03-21 PROCEDURE — 82550 ASSAY OF CK (CPK): CPT

## 2019-03-21 PROCEDURE — 84238 ASSAY NONENDOCRINE RECEPTOR: CPT

## 2019-03-21 PROCEDURE — 85651 RBC SED RATE NONAUTOMATED: CPT

## 2019-03-21 PROCEDURE — 82085 ASSAY OF ALDOLASE: CPT

## 2019-03-21 PROCEDURE — 80164 ASSAY DIPROPYLACETIC ACD TOT: CPT

## 2019-03-21 PROCEDURE — 83516 IMMUNOASSAY NONANTIBODY: CPT

## 2019-03-22 ENCOUNTER — TELEPHONE (OUTPATIENT)
Dept: NEUROLOGY | Age: 49
End: 2019-03-22

## 2019-03-23 LAB — ALDOLASE: 4.5 U/L (ref 1.5–8.1)

## 2019-03-24 LAB — ACETYLCHOLINE RECEPTOR: NORMAL

## 2019-03-25 ENCOUNTER — TELEPHONE (OUTPATIENT)
Dept: NEUROLOGY | Age: 49
End: 2019-03-25

## 2019-03-25 LAB
ACETYLCHOL MODUL AB: NORMAL
ACETYLCHOLINE BLOCKING AB: 0 % (ref 0–26)

## 2019-03-26 LAB — CORTISOL (UR), FREE: NORMAL

## 2019-04-01 ENCOUNTER — TELEPHONE (OUTPATIENT)
Dept: INTERNAL MEDICINE CLINIC | Age: 49
End: 2019-04-01

## 2019-04-01 ENCOUNTER — OFFICE VISIT (OUTPATIENT)
Dept: NEUROLOGY | Age: 49
End: 2019-04-01
Payer: MEDICARE

## 2019-04-01 VITALS
HEIGHT: 71 IN | SYSTOLIC BLOOD PRESSURE: 118 MMHG | HEART RATE: 76 BPM | DIASTOLIC BLOOD PRESSURE: 78 MMHG | WEIGHT: 245.2 LBS | BODY MASS INDEX: 34.33 KG/M2

## 2019-04-01 DIAGNOSIS — R29.898 WEAKNESS OF BOTH HANDS: ICD-10-CM

## 2019-04-01 DIAGNOSIS — M62.81 MUSCLE WEAKNESS: Primary | ICD-10-CM

## 2019-04-01 DIAGNOSIS — G62.9 NEUROPATHY: ICD-10-CM

## 2019-04-01 PROCEDURE — 1036F TOBACCO NON-USER: CPT | Performed by: PSYCHIATRY & NEUROLOGY

## 2019-04-01 PROCEDURE — 99213 OFFICE O/P EST LOW 20 MIN: CPT | Performed by: PSYCHIATRY & NEUROLOGY

## 2019-04-01 PROCEDURE — G8417 CALC BMI ABV UP PARAM F/U: HCPCS | Performed by: PSYCHIATRY & NEUROLOGY

## 2019-04-01 PROCEDURE — G8427 DOCREV CUR MEDS BY ELIG CLIN: HCPCS | Performed by: PSYCHIATRY & NEUROLOGY

## 2019-04-01 PROCEDURE — G8599 NO ASA/ANTIPLAT THER USE RNG: HCPCS | Performed by: PSYCHIATRY & NEUROLOGY

## 2019-04-01 RX ORDER — THIAMINE HCL 100 MG
2500 TABLET ORAL DAILY
COMMUNITY
End: 2020-08-17 | Stop reason: SDUPTHER

## 2019-04-01 NOTE — PATIENT INSTRUCTIONS
Plan:  1. Take vitamin B12 as Instructed by your family doctor. 2. Take vitamin D over-the-counter daily. 3. Take Depakote consistently as prescribed. 4. Call with any new symptoms or concerns. 5. Proceed with Higher level of care referral for second opinion.

## 2019-04-01 NOTE — PROGRESS NOTES
NEUROLOGY OUT PATIENT FOLLOW UP NOTE:  4/1/20199:46 AM    Sade Humphreys is here for follow up for neuropathy, muscle weakness. He reports no new events. Depakote level was low =17. Candi Santiago He reports he takes the Depakote for mood stabilizer. He is on Zonegran, Keppra and Vimpat for epilepsy control. He reports no recent seizures. His symptoms are unchanged. His established with [facility] neurology department. He had testing performed since last visit, he is here to go over the results and plan. ROS:    Cardiac: no chest pain. No palpitations. Renal : no flank pain, no hematuria    Skin: no rash      Allergies   Allergen Reactions    Ciprofloxacin-Ciproflox Hcl Er Other (See Comments)     Elevated creatinine    Heparin      Monitor for thrombocytopenia    Metformin Nausea Only     Abdominal pain, diarrhea    Niacin And Related Other (See Comments)     Burning sensation, severe flushing    Tramadol Hcl      Contraindication to seizure medications    Ultram [Tramadol]      Contraindication to seizure medications    Warfarin      Very difficult to regulate in past       Current Outpatient Medications:     Cyanocobalamin (VITAMIN B-12) 2500 MCG SUBL, Place 2,500 mcg under the tongue daily, Disp: , Rfl:     Needles & Syringes MISC, Use with B 12 injections, Disp: 50 each, Rfl: 1    sildenafil (VIAGRA) 100 MG tablet, Take 1 tablet by mouth as needed for Erectile Dysfunction, Disp: 45 tablet, Rfl: 3    fenofibrate (TRICOR) 145 MG tablet, Take 1 tablet by mouth daily, Disp: 90 tablet, Rfl: 1    pantoprazole (PROTONIX) 40 MG tablet, Take 1 tablet by mouth 2 times daily, Disp: 180 tablet, Rfl: 1    gabapentin (NEURONTIN) 600 MG tablet, Take 1 tablet by mouth 4 times daily for 180 days. ., Disp: 360 tablet, Rfl: 1    atorvastatin (LIPITOR) 10 MG tablet, TAKE 1 TABLET BY MOUTH ONE TIME A DAY, Disp: 30 tablet, Rfl: 5    rOPINIRole (REQUIP) 1 MG tablet, TAKE 1 TABLET BY MOUTH THREE TIMES A DAY, Disp: 270 tablet, Rfl: 1    insulin detemir (LEVEMIR FLEXTOUCH) 100 UNIT/ML injection pen, 75 units every AM and 55 units every PM, Disp: 30 pen, Rfl: 1    insulin aspart (NOVOLOG FLEXPEN) 100 UNIT/ML injection pen, Inject 30 Units into the skin 3 times daily (before meals) Plus sliding scale 4:50 > 150 mg/dl, Disp: 30 pen, Rfl: 1    canagliflozin (INVOKANA) 300 MG TABS tablet, TAKE 1 TABLET BY MOUTH IN THE MORNING BEFORE breakfast, Disp: 90 tablet, Rfl: 1    vitamin D (ERGOCALCIFEROL) 96174 units CAPS capsule, Take 1 capsule by mouth once a week, Disp: 12 capsule, Rfl: 1    blood glucose test strips (ONE TOUCH TEST STRIPS) strip, Use to test blood glucose level 3 times per day. Diagnosis: E11.40, Disp: 450 each, Rfl: 1    DULoxetine (CYMBALTA) 30 MG extended release capsule, TAKE 1 CAPSULE BY MOUTH ONE TIME A DAY, Disp: , Rfl: 2    traZODone (DESYREL) 100 MG tablet, TAKE 1/2 TABLET BY MOUTH AT BEDTIME AS NEEDED for sleep, Disp: , Rfl: 2    metoprolol tartrate (LOPRESSOR) 50 MG tablet, Take 1 tablet by mouth 2 times daily (Patient taking differently: Take 75 mg by mouth 2 times daily ), Disp: 180 tablet, Rfl: 1    divalproex (DEPAKOTE) 500 MG DR tablet, Take 250 mg by mouth 2 times daily , Disp: , Rfl:     Cholecalciferol (VITAMIN D) 2000 units CAPS capsule, Take 2,000 Units by mouth 2 times daily , Disp: 30 capsule, Rfl: 0    lacosamide (VIMPAT) 50 MG TABS tablet, Take 250 mg by mouth 2 times daily. Takes with 100 mg to total 250 mg bid., Disp: , Rfl:     Blood Glucose Monitoring Suppl (ONE TOUCH BASIC SYSTEM) W/DEVICE KIT, Use to test blood glucose level 4 times per day. Diagnosis: E11.40, Disp: 1 kit, Rfl: 0    clindamycin (CLEOCIN T) 1 % external solution, Apply topically 2 times daily.  (Patient taking differently: as needed Apply topically 2 times daily. ), Disp: 30 mL, Rfl: 5    levETIRAcetam (KEPPRA) 500 MG tablet, Take 2,000 mg by mouth 2 times daily , Disp: , Rfl:     LORazepam (ATIVAN) 0.5 MG tablet, Take 0.5 mg by mouth 2 times daily as needed (seizures) , Disp: , Rfl:     zonisamide (ZONEGRAN) 100 MG capsule, Take 500 mg by mouth nightly , Disp: , Rfl:     cyanocobalamin 1000 MCG/ML injection, Inject 1 ml daily X 7 days, then 1 ml weekly X 1 month, then1 ml monthly thereafter., Disp: 25 mL, Rfl: 1    albuterol sulfate  (90 Base) MCG/ACT inhaler, Inhale 2 puffs into the lungs every 4 hours as needed for Wheezing, Disp: 1 Inhaler, Rfl: 0    Pancrelipase, Lip-Prot-Amyl, (CREON) 27357 units CPEP, Take by mouth 3 times daily (with meals) , Disp: , Rfl:       PE:   Vitals:    04/01/19 0931   BP: 118/78   Site: Left Upper Arm   Position: Sitting   Cuff Size: Small Adult   Pulse: 76   Weight: 245 lb 3.2 oz (111.2 kg)   Height: 5' 10.87\" (1.8 m)     General Appearance:  alert and cooperative  Skin:  Skin color, texture, turgor normal. No rashes or lesions. Gen: NAD, Language is Intact. Head: no rash, no icterus  Neck: There is no carotid bruits. The Neck is supple. Neuro: CN 2-12 grossly intact with no focal deficits. Power 5/5 Throughout symmetric, Reflexes are symmetric. Long tracts are intact. Cerebellar exam is Intact. Sensory exam is intact to light touch. Gait is intact. Musculoskeletal:  Has no hand arthritis, no limitation of ROM in any of the four extremities.   Lower extremities no edema        DATA:  Results for orders placed or performed during the hospital encounter of 03/21/19   Acetylcholine Receptor   Result Value Ref Range    ACETYLCHOLINE RECEPTOR SEE BELOW    Acetylcholine Receptor, Blocking   Result Value Ref Range    Acetylcholine Blocking Ab 0 0 - 26 %   Acetylcholine Receptor, Modulating   Result Value Ref Range    Acetylchol Modul Ab SEE BELOW    CK   Result Value Ref Range    Total CK 98 55 - 170 U/L   Sedimentation Rate   Result Value Ref Range    Sed Rate 2 0 - 10 mm/hr   Aldolase   Result Value Ref Range    Aldolase 4.5 1.5 - 8.1 U/L   Valproic acid level, total Result Value Ref Range    Valproic Acid Lvl 17.7 (L) 50.0 - 100.0 ug/mL   Cortisol, Urine, Free   Result Value Ref Range    Cortisol (Ur), Free SEE BELOW           Results for orders placed in visit on 04/10/17   MRI Cervical Spine WO Contrast    Narrative Ordering Provider: [address] [place] [address]        Radiology Department  Patient:  Rob Soto. :  1970   Sex: [place], [address] [facility] [address]  Location:  [address] [place] [address]   Unit #:   [id number]      Acct #:  [de-identified]      Ordering Phys: Alexsandra Courtney MD          Exam Date: 17    Accession #:  [id number]    Exam:  MRI   MRI Cervical Spine w/o Contras    Result:         STUDY:   MRI CERVICAL SPINE WITHOUT CONTRAST        REASON FOR EXAM:   Male, 55years old. The patient presents with a    history of neck pain x \"months\"        TECHNIQUE:   Standardized fat and water weighted pulse sequences were    obtained in the sagittal and axial planes. COMPARISON:   MRI CERVICAL SPINE-2015    ___________________________________        FINDINGS:    There is significant patient motion artifact which has produced spatial    mis-registration and anatomic blurring, however there is significant    information provided by this examination. Normal foramen magnum and brainstem-cervical cord junction. Normal    craniovertebral junction. Normal anterior atlantoaxial articulation. Normal odontoid process. Normal cervical lordosis. Normal vertebral bodies and posterior osseous    elements. C2-3:  Normal endplates. Normal disc height, signal and morphology. Normal central canal and intervertebral neural foramina. C3-4:  Normal endplates. Normal disc height, signal and morphology. Normal central canal and intervertebral neural foramina. C4-5:  Normal endplates. Normal disc height, signal and morphology.     Normal central canal and intervertebral neural foramina. C5-6:  There is disc desiccation, preservation disc height with minimal    annular bulging. The central canal measures 9 mm consistent central canal    stenosis. There is effacement of the circumferential CSF without cervical    cord distortion. There is no signal alteration of the cervical cord. There    is no myelomalacia or cervical cord edema. The bilateral intervertebral    neural foramina remain patent. C6-7:  There is disc desiccation, preservation disc height, with a    posterior right central to right lateral disc osteophyte complex (axial    T2*GRE series 16, image 20), measuring 5 x 10 mm (AP x transverse),    producing rightward ventral thecal sac flattening, narrowing the AP    diameter of the central canal to 8 mm, consistent with a moderate central    canal stenosis. There is effacement of the circumferential CSF without    cervical cord distortion. There is no signal alteration of the cervical    cord. There is no myelomalacia or cervical cord edema. There is interval    stability as compared to the above noted prior comparison study. There is    moderate right-sided osseous foraminal stenosis with potential for neural    impingement. C7-T1:  Normal endplates. Normal disc height, signal and morphology. Normal central canal and intervertebral neural foramina. Normal cervical cord. Normal visualized soft tissue structures. ___________________________________        IMPRESSION:    1. C5-6 disc desiccation with annular bulging producing a mild central    canal stenosis with a loss of functional reserve the central canal.    2.  C6-7 posterior right central to right lateral disc osteophyte complex    producing a moderate central canal with a loss of functional reserve the    central canal.    3.  C6-7 right-sided osseous foraminal stenosis with potential for neural    impingement.         Electronically Signed:    Melyssa Cooper DO    2017/04/29 at 12:13 EDT    Tel [phone], Service support  [phone], Fax [phone]                    cc: All Neumann MD            Dictated by:  Haim Portillo DO on 04/28/17 1447    Technologist:  Josephine MCCANN)(MR)    Transcribed by:  Haim Portillo on 04/29/17 1213        Report Signed by:  Doni Reynolds on 04/29/17 1213         Results for orders placed during the hospital encounter of 06/05/18   CT Head WO Contrast    Narrative PROCEDURE: CT HEAD WO CONTRAST    CLINICAL INFORMATION: History of seizures, for last 1 week. Patient has decreased visual equity. COMPARISON: April 14, 2018 TECHNIQUE: Noncontrast 5 mm axial images were obtained through the brain. All CT scans at this facility use dose modulation, iterative reconstruction, and/or weight-based dosing when appropriate to reduce radiation dose to as low as reasonably achievable. FINDINGS:    Encephalomalacia right frontal lobe. Prior right frontal craniotomy. No acute intracranial hemorrhage or midline shift. Ventricles are within normal limits for age. Partially opacified left maxillary sinus. Mastoid air cells are clear. No depressed   calvarial fracture. Soft tissues are unremarkable. Impression Stable appearance of the brain. No acute intracranial findings. **This report has been created using voice recognition software. It may contain minor errors which are inherent in voice recognition technology. **    Final report electronically signed by Dr. Alis Lehman on 6/5/2018 12:46 PM     MRI Thoracic spine 2/2019:  STUDY:  MRI THORACIC SPINE WITH AND WITHOUT CONTRAST          REASON FOR EXAM:   Male, 50years old. Jaylyn Hammond          TECHNIQUE:   Magnevist 20 ML IV was administered for the contrast portion     of the examination.          COMPARISON:   None.     ___________________________________          FINDINGS:     Normal kyphosis of the thoracic spine.   There is no substantial     scoliosis.     No evidence for acute fracture or subluxation.     There is multilevel disc degeneration.     There is a tiny central disc protrusion at T7-8 mildly narrowing the     spinal canal and encroaching upon the ventral surface of the cord. Marilee Jae     is a tiny central disc protrusion T5-6 also narrowing the spinal canal and     mildly encroaching upon the cord.     Normal visualized thoracic cord. Normal conus medullaris that terminates     at T12-L1          The soft tissue structures are unremarkable.          There is no enhancing abnormality.     ___________________________________          IMPRESSION:     No evidence for acute fracture or other significant bony abnormality.     Multilevel disc degeneration and tiny central disc protrusions at T5-6 and     T7-8 mildly narrowing the spinal canal and encroaching upon the ventral     surface of the cord     No focal lesions within the cord.     No abnormal enhancement following contrast administration         MRI Cervical spine: 2/2019  STUDY:  MRI CERVICAL SPINE WITH AND WITHOUT CONTRAST          REASON FOR EXAM:   Male, 50years old.  Pain          TECHNIQUE:   Standardized fat and water weighted pulse sequences were     obtained in the sagittal and axial following administration of Magnevist     20 ML IV.          COMPARISON:   MRI of the cervical spine on April 20, 2017     ___________________________________          FINDINGS:     Normal foramen magnum and brainstem-cervical cord junction.   Normal     craniovertebral junction.  Normal anterior atlantoaxial articulation.     Normal odontoid process.          Normal cervical lordosis.  Normal vertebral bodies and posterior osseous     elements.          C2-3:  Normal endplates.  Normal disc height, signal and morphology.     Normal central canal and intervertebral neural foramina.          C3-4:  Normal endplates.  Normal disc height, signal and morphology.     Normal central canal.  Mild left neural foraminal encroachment secondary     to bony hypertrophy.          C4-5:  Normal endplates.  Normal disc height, signal and morphology.     Normal central canal and intervertebral neural foramina.          C5-6:  Mild endplate spurring. Riaz Chaya disc height, signal and mild     bulging disc osteophyte complex. Margo Lian narrowing of the central canal and     bilateral neuroforaminal stenosis secondary to bony hypertrophy.          C6-7:  Mild endplate spurring. Normal disc height, signal and mild bulging     disc osteophyte complex. Riaz Chaya central canal.  There is moderate right     neuroforaminal encroachment and mild narrowing on the left secondary to     bony hypertrophy          C7-T1:  Normal endplates.  Normal disc height, signal and morphology.     Normal central canal and intervertebral neural foramina.          Normal cervical cord.          No enhancing lesions following contrast administration Normal visualized     soft tissue structures.     No significant change since previous study     ___________________________________          IMPRESSION:     No evidence for acute fracture or subluxation.     Mild spondylosis most pronounced at C5-6 and C6-7     Mild spinal stenosis at C5-6 and C6-7 slightly greater on the right     secondary to disc disease and bony hypertrophy     No focal lesions within the cord.  No enhancing abnormality           Assessment:     Diagnosis Orders   1. Muscle weakness     2. Weakness of both hands     3. Neuropathy          Depakote level was low =17. Fernanda Menjivar He reports he takes the Depakote for mood stabilizer. He is on Zonegran, Keppra and Vimpat for epilepsy control. He reports no recent seizures. His symptoms are unchanged. His established with [facility] neurology department. We discussed the results of the MRI of the cervical spine, thoracic spine, and results of his blood work, they do not explain his symptoms.   I offered the patient a referral for a second opinion regarding his symptoms, he is established with [facility] neurology. He'll be referred there for second opinion. He was agreeable. Plan:  1. Take vitamin B12 as Instructed by your family doctor. 2. Take vitamin D over-the-counter daily. 3. Take Depakote consistently as prescribed. 4. Call with any new symptoms or concerns. 5. Proceed with Higher level of care referral for second opinion. Please call if any questions.      Hannah Cardenas MD

## 2019-04-02 NOTE — TELEPHONE ENCOUNTER
He just saw Dr. Krishnamurthy Guess yesterday - is the reason for the MRI something he is treating? It may be more appropriate to get him to order it. If it is follow up of clip - ask Dr. Cedrick Churchill office if they will order it as they have been following it. Does he have a thyroid biopsy scheduled this month?

## 2019-04-03 NOTE — TELEPHONE ENCOUNTER
The MRI is scheduled at [facility] and he thinks it was ordered by the [facility]? The thyroid biopsy is scheduled for 4/12/2019.

## 2019-04-12 ENCOUNTER — TELEPHONE (OUTPATIENT)
Dept: INTERNAL MEDICINE CLINIC | Age: 49
End: 2019-04-12

## 2019-04-12 ENCOUNTER — HOSPITAL ENCOUNTER (OUTPATIENT)
Dept: ULTRASOUND IMAGING | Age: 49
Discharge: HOME | End: 2019-04-12
Payer: MEDICARE

## 2019-04-12 DIAGNOSIS — E04.1 THYROID NODULE: ICD-10-CM

## 2019-04-12 PROCEDURE — 60300 ASPIR/INJ THYROID CYST: CPT

## 2019-04-12 PROCEDURE — 88172 CYTP DX EVAL FNA 1ST EA SITE: CPT

## 2019-04-12 PROCEDURE — 88173 CYTOPATH EVAL FNA REPORT: CPT

## 2019-04-12 PROCEDURE — 76942 ECHO GUIDE FOR BIOPSY: CPT

## 2019-04-12 PROCEDURE — 88177 CYTP FNA EVAL EA ADDL: CPT

## 2019-04-12 NOTE — TELEPHONE ENCOUNTER
Nydia Ann from Radiology called saying Dr. Charlotte Dewey would like to speak with her regarding bx of lymph node that is scheduled for this afternoon at 2:00 pm.    Dr. Charlotte Dewey does not think he needs a bx and just an ultrasound as pt was sick when he was scanned the last time. I spoke to  and she will discuss with Dr. Charlotte Dewey.

## 2019-04-12 NOTE — BRIEF OP NOTE
Brief Postoperative Note    Moises Olivares  YOB: 1970  [id number]    Pre-operative Diagnosis: Thyroid nodule    Post-operative Diagnosis: Same    Procedure: US FNA biopsy    Anesthesia: Local    Surgeons/Assistants: LOVE Henning DO    Estimated Blood Loss: less than 50     Complications: None    Specimens: Was Obtained: with on site cytopathology evaluation. Findings: Full report to follow in PACS. Electronically signed by LOVE Shields DO on 4/12/2019 at 2:59 PM

## 2019-04-12 NOTE — H&P
Formulation and discussion of sedation / procedure plans, risks, benefits, side effects and alternatives with patient and/or responsible adult completed. Electronically signed by LOVE Vazquez DO on 4/12/2019 at 2:59 PM

## 2019-04-16 ENCOUNTER — OFFICE VISIT (OUTPATIENT)
Dept: INTERNAL MEDICINE CLINIC | Age: 49
End: 2019-04-16
Payer: MEDICARE

## 2019-04-16 VITALS
HEIGHT: 74 IN | BODY MASS INDEX: 31.7 KG/M2 | SYSTOLIC BLOOD PRESSURE: 118 MMHG | DIASTOLIC BLOOD PRESSURE: 74 MMHG | WEIGHT: 247 LBS | HEART RATE: 92 BPM

## 2019-04-16 DIAGNOSIS — Z79.4 TYPE 2 DIABETES MELLITUS WITH DIABETIC NEUROPATHY, WITH LONG-TERM CURRENT USE OF INSULIN (HCC): ICD-10-CM

## 2019-04-16 DIAGNOSIS — K21.9 GASTROESOPHAGEAL REFLUX DISEASE WITHOUT ESOPHAGITIS: ICD-10-CM

## 2019-04-16 DIAGNOSIS — G25.81 RESTLESS LEGS SYNDROME (RLS): ICD-10-CM

## 2019-04-16 DIAGNOSIS — G62.9 NEUROPATHY: ICD-10-CM

## 2019-04-16 DIAGNOSIS — E78.2 MIXED HYPERLIPIDEMIA: ICD-10-CM

## 2019-04-16 DIAGNOSIS — E55.9 VITAMIN D DEFICIENCY: ICD-10-CM

## 2019-04-16 DIAGNOSIS — E11.40 TYPE 2 DIABETES MELLITUS WITH DIABETIC NEUROPATHY, WITH LONG-TERM CURRENT USE OF INSULIN (HCC): ICD-10-CM

## 2019-04-16 DIAGNOSIS — E04.1 THYROID NODULE: Primary | ICD-10-CM

## 2019-04-16 LAB — HBA1C MFR BLD: 10.5 % (ref 4.3–5.7)

## 2019-04-16 PROCEDURE — 99214 OFFICE O/P EST MOD 30 MIN: CPT | Performed by: INTERNAL MEDICINE

## 2019-04-16 PROCEDURE — 1036F TOBACCO NON-USER: CPT | Performed by: INTERNAL MEDICINE

## 2019-04-16 PROCEDURE — 3046F HEMOGLOBIN A1C LEVEL >9.0%: CPT | Performed by: INTERNAL MEDICINE

## 2019-04-16 PROCEDURE — G8417 CALC BMI ABV UP PARAM F/U: HCPCS | Performed by: INTERNAL MEDICINE

## 2019-04-16 PROCEDURE — 2022F DILAT RTA XM EVC RTNOPTHY: CPT | Performed by: INTERNAL MEDICINE

## 2019-04-16 PROCEDURE — 83036 HEMOGLOBIN GLYCOSYLATED A1C: CPT | Performed by: INTERNAL MEDICINE

## 2019-04-16 PROCEDURE — G8427 DOCREV CUR MEDS BY ELIG CLIN: HCPCS | Performed by: INTERNAL MEDICINE

## 2019-04-16 PROCEDURE — G8599 NO ASA/ANTIPLAT THER USE RNG: HCPCS | Performed by: INTERNAL MEDICINE

## 2019-04-16 RX ORDER — LACOSAMIDE 100 MG/1
200 TABLET, FILM COATED ORAL 2 TIMES DAILY
COMMUNITY
End: 2022-04-07 | Stop reason: SDUPTHER

## 2019-04-16 RX ORDER — DULOXETIN HYDROCHLORIDE 60 MG/1
CAPSULE, DELAYED RELEASE ORAL DAILY
Status: ON HOLD | COMMUNITY
End: 2021-08-04 | Stop reason: HOSPADM

## 2019-04-16 RX ORDER — ROPINIROLE 1 MG/1
TABLET, FILM COATED ORAL
Qty: 270 TABLET | Refills: 1 | Status: SHIPPED | OUTPATIENT
Start: 2019-04-16 | End: 2019-08-12 | Stop reason: SDUPTHER

## 2019-04-16 RX ORDER — FENOFIBRATE 145 MG/1
145 TABLET, COATED ORAL DAILY
Qty: 90 TABLET | Refills: 1 | Status: SHIPPED | OUTPATIENT
Start: 2019-04-16 | End: 2019-08-12 | Stop reason: SDUPTHER

## 2019-04-16 RX ORDER — PANTOPRAZOLE SODIUM 40 MG/1
40 TABLET, DELAYED RELEASE ORAL 2 TIMES DAILY
Qty: 180 TABLET | Refills: 1 | Status: SHIPPED | OUTPATIENT
Start: 2019-04-16 | End: 2019-08-12 | Stop reason: SDUPTHER

## 2019-04-16 RX ORDER — ATORVASTATIN CALCIUM 10 MG/1
TABLET, FILM COATED ORAL
Qty: 30 TABLET | Refills: 5 | Status: SHIPPED | OUTPATIENT
Start: 2019-04-16 | End: 2019-08-12 | Stop reason: SDUPTHER

## 2019-04-16 RX ORDER — ERGOCALCIFEROL 1.25 MG/1
50000 CAPSULE ORAL WEEKLY
Qty: 12 CAPSULE | Refills: 1 | Status: SHIPPED | OUTPATIENT
Start: 2019-04-16 | End: 2019-08-12 | Stop reason: SDUPTHER

## 2019-04-16 NOTE — PROGRESS NOTES
1970    Chief Complaint   Patient presents with    Diabetes     1 month r/s- thyroid biopsy done 4/12/19    Hyperlipidemia    Other     vitamin D def. , vitamin B12 def. , restless leg syndrome        Pt is a 50 y.o. male who presents for 3 month follow up visit. Thyroid nodules - he had ultrasound of thyroid 12/2018 and found to have bilateral nodules - recommended FNA. In 1/2019 he had both nodules done - right was colloid nodule but left nodule had atypia of undetermined significance. Pathology recommended repeat FNA in 3 months. He just had left thyroid nodule FNA 4/2019 and again was shown to have atypia of undetermined significance. In light of this, he is very concerned that thyroid should be removed. He also has enlarged cervical and submandibular lymph nodes seen on thyroid ultrasound (but felt to be reactive - and I had attributed to poor dentition which he can't afford to be dealt with right now). Will send to ENT to see if would consider thyroidectomy or follow thyroid nodule. He would prefer to go to [facility]. DM - uncontrolled and brittle currently. HgA1c 10.5 4/2019 up from 9.3 12/2018. He has multiple factors that make it difficult to treat his diabetes. He has chronic abdominal pain for which he see [facility] and [employer]. He eats very little during the day due to abdominal pain and eats large supper and snacking at night. 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 FSBS see media - high and lows. Very elevated 300-500's  last 2 days but lows prior. Taking Levemir 55/70 units BID, Novolog taking 30 Units base when eating, SSI 3 Units every 50.   To investigate why he is so high the last couple days - not making good food choices. At chicken nuggets and fries last night and grilled cheese with tomato soup at lunch. Suggested more protein/veggies and less carbs. He refuses to see DM clinic or dietician. He is overly stressed with grandchildren and not taking time to think about better choices for himself. I don't know how to make this better other than educate him. Suggested increasing Novolog if eating high carb foods. Did not change Levemir as he is normal in am prior to last 2 days. He denies any signs of infection except chronic poor dentition - no abscess or increased pain. Possible Cushing's with uncontrolled DM - Vee ordered salivary cortisol, it was elevated but not conclusive due to blood contamination. He has very poor dentition, so proceeded with 24 hour urine cortisol rather than repeating salivary cortisol - but the lab didn't give him enough containers with his polyuria. He refused at the lab to repeat. I convinced patient to do it again. Will give 3 urine containers due to polyuria. He did 24 hours urine cortisol level - it was a little high - need to research more - may need Endo follow up. DM - needs to set up eye exam.  Foot exam today - no foot lesions, positive neuropathy - severe - no further options - tried Neurontin, Lyrica, folic BDZI/A11. No autonomic dysfunction. LDL controlled 98 11/2018. Microalbumin and Cr normal 12/2018,     He is complaining of head numbness 10-12x a day, headaches are a separate issue but also bothersome - chronic - did MRI - saw Dr. Frederick Diamond. Nothing new on MRI. He states Dr. Frederick Diamond told him he would have to live with numbness. He saw pulmonary and they referred to neuro locally to evaluate for neuromuscular disease. Due to muscle cramps, fingers lock up, trouble moving diaphragm, chest pain. Saw Dr. Silvana Schwab - for neuromuscular disease (per pulm) - he referred on to  [facility] - he has appointment Vivienne 10 to look into that. severely elevated triglycerides    Hypertension     diastolic    Intracranial arachnoid cyst 11/2012    Dr. Damno Seen drained, complicated with seizures, DKA    Irritable bowel syndrome     Liver disease     Dex Sadler - elevated LFT - positive smooth muscle antibody, steatosis per liver bx 3/2014 with Dr. Jaqueline Veloz (multiple drug resistant organisms) resistance 2012    MRSA (methicillin resistant staph aureus) culture positive     h/o in foot and before brain surgery    Nephrolithiasis     noted on CT abdomen 9/2016    Neuropathy     Pancreatic insufficiency     Positive SANDY (antinuclear antibody)     Dr. Agata Medina - first visit in 6/2013    Pulmonary embolism ([address] 2007    s/p GFF, related to knee surgery    Restless legs syndrome     Seizures ([address]     Sinus tachycardia     Holter 3/2013 - seeing Dr. Tricia Dixon [facility])     clips     Sleep apnea     multiple MD's suggested testing but he refuses to be tested as claustraphobic and won't use Cpap    Type II or unspecified type diabetes mellitus without mention of complication, not stated as uncontrolled 2007       Past Surgical History:   Procedure Laterality Date    CARDIAC CATHETERIZATION      2011,5-6 years ago   Juan Mckeon  3-2012   Titaylor Mckeon  04/28/2016    Luige Madi 56 RELEASE  01/2017    CHOLECYSTECTOMY  2004    COLONOSCOPY  2012    COLONOSCOPY  08/2016    2 tubular adenomas - reportedly needs repeat scope in 6 months    CRANIOTOMY  11/2012    arachnoid cyst drainage    EKG 12-LEAD  10/18/2015         ENDOSCOPY, COLON, DIAGNOSTIC      HERNIA REPAIR Right 1996    [facility]--Dr. Jolynn Spence INGUINAL HERNIA REPAIR Right 09/15/2016    Robotic assisted    KNEE ARTHROSCOPY Right 2016    KNEE SURGERY Left 2007    acl and debrided twice    OTHER SURGICAL HISTORY  5-31-11    Tilt table was associated w/ nonspecific symptoms or nausea, otherwise no significant dizziness or syncope.  No significant hemodynamic changes. Otherwise, unremarkable tilt table test after 30 minutes of tilting.  OTHER SURGICAL HISTORY  01/20/2017    RIGHT SHOULDER ARTHROSCOPY, OPEN STAN, OPEN ACROMIOPLASTY, BICEP TENDESIS, RIGHT CARPAL TUNNEL RELEASE    SHOULDER SURGERY Right 01/2007    TONSILLECTOMY      TRANSTHORACIC ECHOCARDIOGRAM  3-05-11    LV size and systolic function normal. EF 55-65%.  UPPER GASTROINTESTINAL ENDOSCOPY  2012    UPPER GASTROINTESTINAL ENDOSCOPY  2016    Dr. Ene Humphreys  2007       Current Outpatient Medications   Medication Sig Dispense Refill    DULoxetine (CYMBALTA) 60 MG extended release capsule Take 60 mg by mouth daily      lacosamide (VIMPAT) 100 MG TABS tablet Take 300 mg by mouth 2 times daily.  fenofibrate (TRICOR) 145 MG tablet Take 1 tablet by mouth daily 90 tablet 1    pantoprazole (PROTONIX) 40 MG tablet Take 1 tablet by mouth 2 times daily 180 tablet 1    atorvastatin (LIPITOR) 10 MG tablet TAKE 1 TABLET BY MOUTH ONE TIME A DAY 30 tablet 5    rOPINIRole (REQUIP) 1 MG tablet TAKE 1 TABLET BY MOUTH THREE TIMES A  tablet 1    insulin detemir (LEVEMIR FLEXTOUCH) 100 UNIT/ML injection pen 75 units every AM and 55 units every PM 30 pen 1    insulin aspart (NOVOLOG FLEXPEN) 100 UNIT/ML injection pen Inject 30 Units into the skin 3 times daily (before meals) Plus sliding scale 4:50 > 150 mg/dl 30 pen 1    canagliflozin (INVOKANA) 300 MG TABS tablet TAKE 1 TABLET BY MOUTH IN THE MORNING BEFORE breakfast 90 tablet 1    vitamin D (ERGOCALCIFEROL) 57899 units CAPS capsule Take 1 capsule by mouth once a week 12 capsule 1    blood glucose test strips (ONE TOUCH TEST STRIPS) strip Use to test blood glucose level 3 times per day.  Diagnosis: E11.40 450 each 1    Cyanocobalamin (VITAMIN B-12) 2500 MCG SUBL Place 2,500 mcg under the tongue daily      sildenafil (VIAGRA) 100 MG tablet Take 1 tablet by mouth as needed for Erectile Dysfunction 45 tablet 3    gabapentin (NEURONTIN) 600 MG tablet Take 1 tablet by mouth 4 times daily for 180 days. . 360 tablet 1    traZODone (DESYREL) 100 MG tablet TAKE 1/2 TABLET BY MOUTH AT BEDTIME AS NEEDED for sleep  2    metoprolol tartrate (LOPRESSOR) 50 MG tablet Take 1 tablet by mouth 2 times daily (Patient taking differently: Take 75 mg by mouth 2 times daily ) 180 tablet 1    divalproex (DEPAKOTE) 500 MG DR tablet Take 500 mg by mouth 2 times daily       Blood Glucose Monitoring Suppl (ONE TOUCH BASIC SYSTEM) W/DEVICE KIT Use to test blood glucose level 4 times per day. Diagnosis: E11.40 1 kit 0    clindamycin (CLEOCIN T) 1 % external solution Apply topically 2 times daily. (Patient taking differently: as needed Apply topically 2 times daily. ) 30 mL 5    levETIRAcetam (KEPPRA) 500 MG tablet Take 2,000 mg by mouth 2 times daily       LORazepam (ATIVAN) 0.5 MG tablet Take 0.5 mg by mouth 2 times daily as needed (seizures)       zonisamide (ZONEGRAN) 100 MG capsule Take 500 mg by mouth nightly       albuterol sulfate  (90 Base) MCG/ACT inhaler Inhale 2 puffs into the lungs every 4 hours as needed for Wheezing 1 Inhaler 0     No current facility-administered medications for this visit.         Allergies   Allergen Reactions    Ciprofloxacin-Ciproflox Hcl Er Other (See Comments)     Elevated creatinine    Heparin      Monitor for thrombocytopenia    Metformin Nausea Only     Abdominal pain, diarrhea    Niacin And Related Other (See Comments)     Burning sensation, severe flushing    Tramadol Hcl      Contraindication to seizure medications    Ultram [Tramadol]      Contraindication to seizure medications    Warfarin      Very difficult to regulate in past       Social History     Socioeconomic History    Marital status:      Spouse name: Not on file    Number of children: 3    Years of education: Not on file    Highest education level: Not on file   Occupational History    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) - patient states current chest pain is not different, no dyspnea on exertion, tolerates vacuuming  Gastrointestinal ROS: positive intermittent chronic abdominal pain, change in bowel habits, or black or bloody stools, positive nausea, intermittent vomiting  Genito-Urinary ROS: no dysuria, trouble voiding, or hematuria - increased frequency, urgency  Neurological ROS: negative for - headaches, or weakness, positive numbness/pain in feet/legs, and seizures  Dermatological ROS: negative for - rash or skin lesion changes, except chronic rash on legs    Blood pressure 118/74, pulse 92, height 6' 2\" (1.88 m), weight 247 lb (112 kg). Physical Examination: General appearance -alert, well appearing, and in no distress  Mental status - alert, oriented to person, place, and time  Neck - supple, no significant adenopathy  Chest - clear to auscultation, positive wheezes, no rales or rhonchi, symmetric air entry  Heart - normal rate, regular rhythm, normal S1, S2, no murmurs, rubs, clicks or gallops  Abdomen -soft, nontender, nondistended  Extremities - peripheral pulses normal, no pedal edema, no clubbing or cyanosis  Neurological - alert, oriented  Skin - warm and dry    Foot exam 4/16/19: no foot lesions, sensation decreased to level of mid shin with monofilament testing.  +2 DP and PT pulses bilaterally. Diagnostic Data:  Reviewed labs from 4/2019 with patient. Assessment and Plan:     Diagnosis Orders   1. Thyroid nodule  External Referral To ENT   2. Type 2 diabetes mellitus with diabetic neuropathy, with long-term current use of insulin ([facility])  POCT glycosylated hemoglobin (Hb A1C)    55818 - Collection Capillary Blood Specimen     DIABETES FOOT EXAM    insulin detemir (LEVEMIR FLEXTOUCH) 100 UNIT/ML injection pen    insulin aspart (NOVOLOG FLEXPEN) 100 UNIT/ML injection pen    canagliflozin (INVOKANA) 300 MG TABS tablet    blood glucose test strips (ONE TOUCH TEST STRIPS) strip   3.  Mixed hyperlipidemia  fenofibrate (TRICOR) 145 MG tablet    atorvastatin (LIPITOR) 10 MG tablet   4. Gastroesophageal reflux disease without esophagitis  pantoprazole (PROTONIX) 40 MG tablet   5. Neuropathy     6. Restless legs syndrome (RLS)  rOPINIRole (REQUIP) 1 MG tablet   7. Vitamin D deficiency  vitamin D (ERGOCALCIFEROL) 47631 units CAPS capsule     Thyroid nodule - need to send to ENT to evaluate atypia of undetermined significance x 2 on left thyroid nodule - thyroidectomy or follow. DM - not controlled but not eating regularly or eating a DM diet. Advised to increase Novolog to get better control when eating high carb foods. Again stressed importance of eating regular meals - previously suggested protein drinks just to have a little input at breakfast.   He is to work on eating every 4 hours - just small amounts, take insulin as prescribed. He is to send in FSBS in a couple weeks to adjust insulin further. Foot exam done today, need eye exam.    Hyperlipidemia - LDL at goal 11/2108 - refill fenofibrate, Lipitor. GERD - stable but still with chronic abdominal pain and refuses to see GI sooner as this is chronic and they have not been able to figure it out yet. Neuropathy - severe, continue Neurontin. RLS - stable on Requip - refill. Vitamin D deficiency - refill med. Mildly elevated urine cortisol - follow for now. Follow up visit in 4 weeks for DM and acute issues. Vicente Grajeda MD    [facility]  [facility]. [place]  [facility] [address]  [address]  [place] [address]  Dept: [phone]  Dept Fax: [phone] : [phone]

## 2019-05-16 ENCOUNTER — OFFICE VISIT (OUTPATIENT)
Dept: INTERNAL MEDICINE CLINIC | Age: 49
End: 2019-05-16
Payer: MEDICARE

## 2019-05-16 VITALS
HEART RATE: 88 BPM | BODY MASS INDEX: 30.35 KG/M2 | WEIGHT: 236.5 LBS | SYSTOLIC BLOOD PRESSURE: 126 MMHG | DIASTOLIC BLOOD PRESSURE: 76 MMHG | HEIGHT: 74 IN

## 2019-05-16 DIAGNOSIS — E11.65 TYPE 2 DIABETES MELLITUS WITH HYPERGLYCEMIA, WITH LONG-TERM CURRENT USE OF INSULIN (HCC): Primary | ICD-10-CM

## 2019-05-16 DIAGNOSIS — Z79.4 TYPE 2 DIABETES MELLITUS WITH HYPERGLYCEMIA, WITH LONG-TERM CURRENT USE OF INSULIN (HCC): Primary | ICD-10-CM

## 2019-05-16 DIAGNOSIS — G62.9 NEUROPATHY: ICD-10-CM

## 2019-05-16 DIAGNOSIS — E04.1 THYROID NODULE: ICD-10-CM

## 2019-05-16 PROCEDURE — 2022F DILAT RTA XM EVC RTNOPTHY: CPT | Performed by: INTERNAL MEDICINE

## 2019-05-16 PROCEDURE — 99213 OFFICE O/P EST LOW 20 MIN: CPT | Performed by: INTERNAL MEDICINE

## 2019-05-16 PROCEDURE — G8427 DOCREV CUR MEDS BY ELIG CLIN: HCPCS | Performed by: INTERNAL MEDICINE

## 2019-05-16 PROCEDURE — 1036F TOBACCO NON-USER: CPT | Performed by: INTERNAL MEDICINE

## 2019-05-16 PROCEDURE — G8417 CALC BMI ABV UP PARAM F/U: HCPCS | Performed by: INTERNAL MEDICINE

## 2019-05-16 PROCEDURE — 3046F HEMOGLOBIN A1C LEVEL >9.0%: CPT | Performed by: INTERNAL MEDICINE

## 2019-05-16 PROCEDURE — G8599 NO ASA/ANTIPLAT THER USE RNG: HCPCS | Performed by: INTERNAL MEDICINE

## 2019-05-16 RX ORDER — PHENOL 1.4 %
AEROSOL, SPRAY (ML) MUCOUS MEMBRANE NIGHTLY
COMMUNITY
End: 2021-03-02 | Stop reason: ALTCHOICE

## 2019-05-16 NOTE — PROGRESS NOTES
1970    Chief Complaint   Patient presents with    Diabetes     1 month f/u        Pt is a 50 y.o. male who presents for 1 month follow up visit. Neuropathy - 2 week ago stopped Neurontin cold turkey. Side effects of vomiting and numbness is head is much more severe. He is willing to go back on Neurontin but with go back up slowly. Jasvir - BREANN - new psychiatrist.  He is aware of his suicidal thoughts. DM - today's visit it was for DM but vomiting and didn't bring in numbers - low 100-200s. They are watching diet better. He did not follow up with ENT - overwhelmed. Thyroid nodules - he had ultrasound of thyroid 12/2018 and found to have bilateral nodules - recommended FNA. In 1/2019 he had both nodules done - right was colloid nodule but left nodule had atypia of undetermined significance. Pathology recommended repeat FNA in 3 months. He just had left thyroid nodule FNA 4/2019 and again was shown to have atypia of undetermined significance. In light of this, he is very concerned that thyroid should be removed. He also has enlarged cervical and submandibular lymph nodes seen on thyroid ultrasound (but felt to be reactive - and I had attributed to poor dentition which he can't afford to be dealt with right now). Will send to ENT to see if would consider thyroidectomy or follow thyroid nodule. He would prefer to go to [facility]. DM - uncontrolled and brittle currently. HgA1c 10.5 4/2019 up from 9.3 12/2018. He has multiple factors that make it difficult to treat his diabetes. He has chronic abdominal pain for which he see [facility] and [employer]. He eats very little during the day due to abdominal pain and eats large supper and snacking at night. 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   He also babysits a 10year old and very difficult to make discolored) and order CT or ultrasound to follow up - want to discuss with radiology on which test to order. Nausea - encouraged follow up with GI - he will consider it. He is depressed, feels like this has been worked up before and no answer. He did not follow through with gastric emptying study ordered in hospital.  He had CT abd in ER and negative. Need better control of DM. Continue PPI. Vitamin D deficiency - on 6000 IU daily and level 20 4/2018. Increased to 4000 IU BID. Check level now. He is complaining of episodic chest pain with SOB, diaphoresis, tachycardia that lasts 2-20 minutes up to 12 x a day. When it happens it hurts to take deep breath - but not present when no chest pain. He had an episode during my last visit - no pain to palpation of chest.  Positive diaphoresis, tachycardia, but also at time had a sugar of 72. Given glucose tab and crackers. He was given a stress test in ER which was unremarkable. For the tachycardia - at last visit, increased metoprolol and added prednisone for possible pleurisy and abnormal CT. Chest pain still an issue - set up ECHO and visit with Dr. Dayan Means and see if he thinks could be due to bridging of LAD. Reviewed CT results - ground glass opacities resolved after steroids.     Past Medical History:   Diagnosis Date    Acid reflux     Anemia     previously seen by Dr. Mariano Olsen (due to enlarged spleen)    B[facility] disorder ([facility] [address]     Blood clot in vein 4/7/16    Sherran Laughter     Chest pain     previously seeing [facility] cardiologist, now seeing Dr. Dayan Means (LAD bridging on cath 4/2016)    Chronic back pain     Chronic bronchitis ([facility] [address]     Dr. Kayla Mir 10/2012 - no longer following with him    Colon polyp 08/30/2016    Depression     Dr. Canales Bone DVT (deep venous thrombosis) ([facility] [address] [id number]    not on Coumadin due to unable to regulate - Dr. Hawa Guerra - no etiology found per patient    Esophageal abnormality     nodule     Fatty liver disease, Right 1996    [facility]--Dr. Tc Joe    INGUINAL HERNIA REPAIR Right 09/15/2016    Robotic assisted    KNEE ARTHROSCOPY Right 2016    KNEE SURGERY Left 2007    acl and debrided twice    OTHER SURGICAL HISTORY  5-31-11    Tilt table was associated w/ nonspecific symptoms or nausea, otherwise no significant dizziness or syncope. No significant hemodynamic changes. Otherwise, unremarkable tilt table test after 30 minutes of tilting.  OTHER SURGICAL HISTORY  01/20/2017    RIGHT SHOULDER ARTHROSCOPY, OPEN STAN, OPEN ACROMIOPLASTY, BICEP TENDESIS, RIGHT CARPAL TUNNEL RELEASE    SHOULDER SURGERY Right 01/2007    TONSILLECTOMY      TRANSTHORACIC ECHOCARDIOGRAM  3-05-11    LV size and systolic function normal. EF 55-65%.  UPPER GASTROINTESTINAL ENDOSCOPY  2012    UPPER GASTROINTESTINAL ENDOSCOPY  2016    Dr. Britany Torres  2007       Current Outpatient Medications   Medication Sig Dispense Refill    Melatonin 10 MG TABS Take 30 mg of ampicillin by mouth nightly      DULoxetine (CYMBALTA) 60 MG extended release capsule Take 60 mg by mouth daily      lacosamide (VIMPAT) 100 MG TABS tablet Take 300 mg by mouth 2 times daily.       fenofibrate (TRICOR) 145 MG tablet Take 1 tablet by mouth daily 90 tablet 1    pantoprazole (PROTONIX) 40 MG tablet Take 1 tablet by mouth 2 times daily 180 tablet 1    atorvastatin (LIPITOR) 10 MG tablet TAKE 1 TABLET BY MOUTH ONE TIME A DAY 30 tablet 5    rOPINIRole (REQUIP) 1 MG tablet TAKE 1 TABLET BY MOUTH THREE TIMES A  tablet 1    insulin detemir (LEVEMIR FLEXTOUCH) 100 UNIT/ML injection pen 75 units every AM and 55 units every PM 30 pen 1    insulin aspart (NOVOLOG FLEXPEN) 100 UNIT/ML injection pen Inject 30 Units into the skin 3 times daily (before meals) Plus sliding scale 4:50 > 150 mg/dl 30 pen 1    canagliflozin (INVOKANA) 300 MG TABS tablet TAKE 1 TABLET BY MOUTH IN THE MORNING BEFORE breakfast 90 tablet 1    vitamin D (ERGOCALCIFEROL) 23255 units CAPS capsule Take 1 capsule by mouth once a week 12 capsule 1    blood glucose test strips (ONE TOUCH TEST STRIPS) strip Use to test blood glucose level 3 times per day. Diagnosis: E11.40 450 each 1    Cyanocobalamin (VITAMIN B-12) 2500 MCG SUBL Place 2,500 mcg under the tongue daily      sildenafil (VIAGRA) 100 MG tablet Take 1 tablet by mouth as needed for Erectile Dysfunction 45 tablet 3    gabapentin (NEURONTIN) 600 MG tablet Take 1 tablet by mouth 4 times daily for 180 days. . 360 tablet 1    metoprolol tartrate (LOPRESSOR) 50 MG tablet Take 1 tablet by mouth 2 times daily (Patient taking differently: Take 75 mg by mouth 2 times daily ) 180 tablet 1    divalproex (DEPAKOTE) 500 MG DR tablet Take 500 mg by mouth 2 times daily       Blood Glucose Monitoring Suppl (ONE TOUCH BASIC SYSTEM) W/DEVICE KIT Use to test blood glucose level 4 times per day. Diagnosis: E11.40 1 kit 0    clindamycin (CLEOCIN T) 1 % external solution Apply topically 2 times daily. (Patient taking differently: as needed Apply topically 2 times daily. ) 30 mL 5    levETIRAcetam (KEPPRA) 500 MG tablet Take 2,000 mg by mouth 2 times daily       LORazepam (ATIVAN) 0.5 MG tablet Take 0.5 mg by mouth 2 times daily as needed (seizures)       zonisamide (ZONEGRAN) 100 MG capsule Take 500 mg by mouth nightly        No current facility-administered medications for this visit.         Allergies   Allergen Reactions    Ciprofloxacin-Ciproflox Hcl Er Other (See Comments)     Elevated creatinine    Heparin      Monitor for thrombocytopenia    Metformin Nausea Only     Abdominal pain, diarrhea    Niacin And Related Other (See Comments)     Burning sensation, severe flushing    Tramadol Hcl      Contraindication to seizure medications    Ultram [Tramadol]      Contraindication to seizure medications    Warfarin      Very difficult to regulate in past       Social History     Socioeconomic History    Marital surgeries  Respiratory ROS: no cough, shortness of breath, wheezing  Cardiovascular ROS: chronic intermittent chest pain - thought to be due to anxiety (GI and cardiology aware and completed work-up in past) - patient states current chest pain is not different, no dyspnea on exertion, tolerates vacuuming  Gastrointestinal ROS: positive intermittent chronic abdominal pain, change in bowel habits, or black or bloody stools, positive nausea, intermittent vomiting  Genito-Urinary ROS: no dysuria, trouble voiding, or hematuria - increased frequency, urgency  Neurological ROS: negative for - headaches, or weakness, positive numbness/pain in feet/legs, and seizures  Dermatological ROS: negative for - rash or skin lesion changes, except chronic rash on legs    Blood pressure 126/76, pulse 88, height 6' 2\" (1.88 m), weight 236 lb 8 oz (107.3 kg). Physical Examination: General appearance -alert, well appearing, and in no distress  Mental status - alert, oriented to person, place, and time  Neck - supple, no significant adenopathy  Chest - clear to auscultation, positive wheezes, no rales or rhonchi, symmetric air entry  Heart - normal rate, regular rhythm, normal S1, S2, no murmurs, rubs, clicks or gallops  Abdomen -soft, nontender, nondistended  Extremities - peripheral pulses normal, no pedal edema, no clubbing or cyanosis  Neurological - alert, oriented  Skin - warm and dry    Foot exam 4/16/19: no foot lesions, sensation decreased to level of mid shin with monofilament testing.  +2 DP and PT pulses bilaterally. Diagnostic Data:  Reviewed labs from 4/2019 with patient. Assessment and Plan:     Diagnosis Orders   1. Type 2 diabetes mellitus with hyperglycemia, with long-term current use of insulin ([address]     2. Neuropathy     3. Thyroid nodule       Thyroid nodule - need to send to ENT to evaluate atypia of undetermined significance x 2 on left thyroid nodule - thyroidectomy or follow.     DM - not controlled but not eating regularly or eating a DM diet. Advised to increase Novolog to get better control when eating high carb foods. Again stressed importance of eating regular meals - previously suggested protein drinks just to have a little input at breakfast.   He is to work on eating every 4 hours - just small amounts, take insulin as prescribed. He is to send in FSBS in a couple weeks to adjust insulin further. Foot exam done today, need eye exam.    Hyperlipidemia - LDL at goal 11/2108 - refill fenofibrate, Lipitor. GERD - stable but still with chronic abdominal pain and refuses to see GI sooner as this is chronic and they have not been able to figure it out yet. Neuropathy - severe, continue Neurontin. RLS - stable on Requip - refill. Vitamin D deficiency - refill med. Mildly elevated urine cortisol - follow for now. Follow up visit in 6 weeks for DM and acute issues. Emilio Cameron MD    [facility] PROFESSIONAL SERVS  [facility] [address]  [place] [address]  [address]  [address]  Dept: [phone]  Dept Fax: [phone] : [phone]

## 2019-05-22 DIAGNOSIS — G62.9 NEUROPATHY: ICD-10-CM

## 2019-05-22 NOTE — TELEPHONE ENCOUNTER
Jolene Mclean called requesting a refill on the following medications:  Requested Prescriptions     Pending Prescriptions Disp Refills    gabapentin (NEURONTIN) 600 MG tablet 360 tablet 1     Sig: Take 1 tablet by mouth 4 times daily for 180 days. Pharmacy verified:  [facility]      Date of last visit:  5/16/19  Date of next visit (if applicable): 7/44/5145

## 2019-05-28 RX ORDER — GABAPENTIN 600 MG/1
600 TABLET ORAL 4 TIMES DAILY
Qty: 360 TABLET | Refills: 1 | Status: SHIPPED | OUTPATIENT
Start: 2019-05-28 | End: 2019-12-17 | Stop reason: SDUPTHER

## 2019-06-10 ENCOUNTER — TELEPHONE (OUTPATIENT)
Dept: INTERNAL MEDICINE CLINIC | Age: 49
End: 2019-06-10

## 2019-06-10 NOTE — TELEPHONE ENCOUNTER
Patient states that the neurologist says the MRI is normal and to follow up with you regarding \"head\" issues. He has an appt on June 24th, is there anything else until then?

## 2019-06-18 NOTE — TELEPHONE ENCOUNTER
Notified patient that you will talk with him at the visit about neurological issues and he will bring his glucometer and/or numbers to the visit.

## 2019-06-22 ENCOUNTER — HOSPITAL ENCOUNTER (EMERGENCY)
Age: 49
Discharge: HOME | End: 2019-06-23
Payer: MEDICARE

## 2019-06-22 ENCOUNTER — APPOINTMENT (OUTPATIENT)
Dept: CT IMAGING | Age: 49
End: 2019-06-22
Payer: MEDICARE

## 2019-06-22 DIAGNOSIS — S32.059A CLOSED FRACTURE OF FIFTH LUMBAR VERTEBRA, UNSPECIFIED FRACTURE MORPHOLOGY, INITIAL ENCOUNTER (HCC): Primary | ICD-10-CM

## 2019-06-22 DIAGNOSIS — R10.11 ABDOMINAL PAIN, RIGHT UPPER QUADRANT: ICD-10-CM

## 2019-06-22 LAB
ALBUMIN SERPL-MCNC: 4.3 G/DL (ref 3.5–5.1)
ALP BLD-CCNC: 71 U/L (ref 38–126)
ALT SERPL-CCNC: 19 U/L (ref 11–66)
ANION GAP SERPL CALCULATED.3IONS-SCNC: 12 MEQ/L (ref 8–16)
AST SERPL-CCNC: 13 U/L (ref 5–40)
BASE EXCESS (CALCULATED): 2.5 MMOL/L (ref -2.5–2.5)
BASOPHILS # BLD: 0.4 %
BASOPHILS ABSOLUTE: 0 THOU/MM3 (ref 0–0.1)
BETA-HYDROXYBUTYRATE: 0.6 MG/DL (ref 0.2–2.81)
BILIRUB SERPL-MCNC: 0.9 MG/DL (ref 0.3–1.2)
BILIRUBIN DIRECT: < 0.2 MG/DL (ref 0–0.3)
BILIRUBIN URINE: NEGATIVE
BLOOD, URINE: NEGATIVE
BUN BLDV-MCNC: 9 MG/DL (ref 7–22)
CALCIUM SERPL-MCNC: 9.4 MG/DL (ref 8.5–10.5)
CHARACTER, URINE: CLEAR
CHLORIDE BLD-SCNC: 102 MEQ/L (ref 98–111)
CHP ED QC CHECK: YES
CHP ED QC CHECK: YES
CO2: 28 MEQ/L (ref 23–33)
COLLECTED BY:: ABNORMAL
COLOR: YELLOW
CREAT SERPL-MCNC: 0.6 MG/DL (ref 0.4–1.2)
DEVICE: ABNORMAL
EKG ATRIAL RATE: 81 BPM
EKG P AXIS: 42 DEGREES
EKG P-R INTERVAL: 174 MS
EKG Q-T INTERVAL: 360 MS
EKG QRS DURATION: 96 MS
EKG QTC CALCULATION (BAZETT): 418 MS
EKG R AXIS: 35 DEGREES
EKG T AXIS: -6 DEGREES
EKG VENTRICULAR RATE: 81 BPM
EOSINOPHIL # BLD: 1.8 %
EOSINOPHILS ABSOLUTE: 0.1 THOU/MM3 (ref 0–0.4)
ERYTHROCYTE [DISTWIDTH] IN BLOOD BY AUTOMATED COUNT: 12.4 % (ref 11.5–14.5)
ERYTHROCYTE [DISTWIDTH] IN BLOOD BY AUTOMATED COUNT: 39 FL (ref 35–45)
GFR SERPL CREATININE-BSD FRML MDRD: > 90 ML/MIN/1.73M2
GLUCOSE BLD-MCNC: 120 MG/DL
GLUCOSE BLD-MCNC: 120 MG/DL
GLUCOSE BLD-MCNC: 120 MG/DL (ref 70–108)
GLUCOSE BLD-MCNC: 123 MG/DL (ref 70–108)
GLUCOSE BLD-MCNC: 206 MG/DL (ref 70–108)
GLUCOSE URINE: >= 1000 MG/DL
GLUCOSE, WHOLE BLOOD: 76 MG/DL (ref 70–108)
HCO3: 27 MMOL/L (ref 23–28)
HCT VFR BLD CALC: 44.1 % (ref 42–52)
HEMOGLOBIN: 15.4 GM/DL (ref 14–18)
IMMATURE GRANS (ABS): 0.05 THOU/MM3 (ref 0–0.07)
IMMATURE GRANULOCYTES: 0.7 %
KETONES, URINE: NEGATIVE
LEUKOCYTE ESTERASE, URINE: NEGATIVE
LIPASE: 33.1 U/L (ref 5.6–51.3)
LYMPHOCYTES # BLD: 33.5 %
LYMPHOCYTES ABSOLUTE: 2.4 THOU/MM3 (ref 1–4.8)
MAGNESIUM: 1.7 MG/DL (ref 1.6–2.4)
MCH RBC QN AUTO: 30.5 PG (ref 26–33)
MCHC RBC AUTO-ENTMCNC: 34.9 GM/DL (ref 32.2–35.5)
MCV RBC AUTO: 87.3 FL (ref 80–94)
MONOCYTES # BLD: 10.7 %
MONOCYTES ABSOLUTE: 0.8 THOU/MM3 (ref 0.4–1.3)
NITRITE, URINE: NEGATIVE
NUCLEATED RED BLOOD CELLS: 0 /100 WBC
O2 SATURATION: 94 %
OSMOLALITY CALCULATION: 283.2 MOSMOL/KG (ref 275–300)
PCO2: 39 MMHG (ref 35–45)
PH BLOOD GAS: 7.44 (ref 7.35–7.45)
PH UA: 5.5 (ref 5–9)
PHOSPHORUS: 3 MG/DL (ref 2.4–4.7)
PLATELET # BLD: 233 THOU/MM3 (ref 130–400)
PMV BLD AUTO: 9.9 FL (ref 9.4–12.4)
PO2: 67 MMHG (ref 71–104)
POTASSIUM SERPL-SCNC: 4.2 MEQ/L (ref 3.5–5.2)
PRO-BNP: 53.9 PG/ML (ref 0–450)
PROTEIN UA: NEGATIVE
RBC # BLD: 5.05 MILL/MM3 (ref 4.7–6.1)
SEG NEUTROPHILS: 52.9 %
SEGMENTED NEUTROPHILS ABSOLUTE COUNT: 3.9 THOU/MM3 (ref 1.8–7.7)
SODIUM BLD-SCNC: 142 MEQ/L (ref 135–145)
SOURCE, BLOOD GAS: ABNORMAL
SPECIFIC GRAVITY, URINE: > 1.03 (ref 1–1.03)
TOTAL PROTEIN: 7.2 G/DL (ref 6.1–8)
TROPONIN T: < 0.01 NG/ML
UROBILINOGEN, URINE: 0.2 EU/DL (ref 0–1)
WBC # BLD: 7.3 THOU/MM3 (ref 4.8–10.8)

## 2019-06-22 PROCEDURE — 96375 TX/PRO/DX INJ NEW DRUG ADDON: CPT

## 2019-06-22 PROCEDURE — 76376 3D RENDER W/INTRP POSTPROCES: CPT

## 2019-06-22 PROCEDURE — 83735 ASSAY OF MAGNESIUM: CPT

## 2019-06-22 PROCEDURE — 81003 URINALYSIS AUTO W/O SCOPE: CPT

## 2019-06-22 PROCEDURE — 96374 THER/PROPH/DIAG INJ IV PUSH: CPT

## 2019-06-22 PROCEDURE — 6370000000 HC RX 637 (ALT 250 FOR IP): Performed by: PHYSICIAN ASSISTANT

## 2019-06-22 PROCEDURE — 36600 WITHDRAWAL OF ARTERIAL BLOOD: CPT

## 2019-06-22 PROCEDURE — 96372 THER/PROPH/DIAG INJ SC/IM: CPT

## 2019-06-22 PROCEDURE — 82010 KETONE BODYS QUAN: CPT

## 2019-06-22 PROCEDURE — 82248 BILIRUBIN DIRECT: CPT

## 2019-06-22 PROCEDURE — 6360000004 HC RX CONTRAST MEDICATION: Performed by: PHYSICIAN ASSISTANT

## 2019-06-22 PROCEDURE — 84100 ASSAY OF PHOSPHORUS: CPT

## 2019-06-22 PROCEDURE — 93005 ELECTROCARDIOGRAM TRACING: CPT | Performed by: PHYSICIAN ASSISTANT

## 2019-06-22 PROCEDURE — 83690 ASSAY OF LIPASE: CPT

## 2019-06-22 PROCEDURE — 36415 COLL VENOUS BLD VENIPUNCTURE: CPT

## 2019-06-22 PROCEDURE — 82947 ASSAY GLUCOSE BLOOD QUANT: CPT

## 2019-06-22 PROCEDURE — 80053 COMPREHEN METABOLIC PANEL: CPT

## 2019-06-22 PROCEDURE — 83880 ASSAY OF NATRIURETIC PEPTIDE: CPT

## 2019-06-22 PROCEDURE — 85025 COMPLETE CBC W/AUTO DIFF WBC: CPT

## 2019-06-22 PROCEDURE — 99284 EMERGENCY DEPT VISIT MOD MDM: CPT

## 2019-06-22 PROCEDURE — 82803 BLOOD GASES ANY COMBINATION: CPT

## 2019-06-22 PROCEDURE — 84484 ASSAY OF TROPONIN QUANT: CPT

## 2019-06-22 PROCEDURE — 74177 CT ABD & PELVIS W/CONTRAST: CPT

## 2019-06-22 PROCEDURE — 82948 REAGENT STRIP/BLOOD GLUCOSE: CPT

## 2019-06-22 PROCEDURE — 2580000003 HC RX 258: Performed by: PHYSICIAN ASSISTANT

## 2019-06-22 PROCEDURE — 6360000002 HC RX W HCPCS: Performed by: PHYSICIAN ASSISTANT

## 2019-06-22 RX ORDER — ORPHENADRINE CITRATE 30 MG/ML
60 INJECTION INTRAMUSCULAR; INTRAVENOUS ONCE
Status: COMPLETED | OUTPATIENT
Start: 2019-06-22 | End: 2019-06-22

## 2019-06-22 RX ORDER — DICYCLOMINE HYDROCHLORIDE 10 MG/1
10 CAPSULE ORAL ONCE
Status: COMPLETED | OUTPATIENT
Start: 2019-06-22 | End: 2019-06-22

## 2019-06-22 RX ORDER — ONDANSETRON 2 MG/ML
4 INJECTION INTRAMUSCULAR; INTRAVENOUS ONCE
Status: COMPLETED | OUTPATIENT
Start: 2019-06-22 | End: 2019-06-22

## 2019-06-22 RX ORDER — LIDOCAINE 4 G/G
1 PATCH TOPICAL DAILY
Status: DISCONTINUED | OUTPATIENT
Start: 2019-06-23 | End: 2019-06-23 | Stop reason: HOSPADM

## 2019-06-22 RX ORDER — CYCLOBENZAPRINE HCL 10 MG
10 TABLET ORAL 3 TIMES DAILY PRN
Qty: 15 TABLET | Refills: 0 | Status: SHIPPED | OUTPATIENT
Start: 2019-06-22 | End: 2019-06-27

## 2019-06-22 RX ORDER — HYDROCODONE BITARTRATE AND ACETAMINOPHEN 5; 325 MG/1; MG/1
1 TABLET ORAL EVERY 6 HOURS PRN
Qty: 12 TABLET | Refills: 0 | Status: SHIPPED | OUTPATIENT
Start: 2019-06-22 | End: 2019-06-25

## 2019-06-22 RX ORDER — LIDOCAINE 50 MG/G
1 PATCH TOPICAL DAILY
Qty: 10 PATCH | Refills: 0 | Status: SHIPPED | OUTPATIENT
Start: 2019-06-22 | End: 2019-07-02

## 2019-06-22 RX ORDER — MORPHINE SULFATE 2 MG/ML
2 INJECTION, SOLUTION INTRAMUSCULAR; INTRAVENOUS ONCE
Status: COMPLETED | OUTPATIENT
Start: 2019-06-22 | End: 2019-06-22

## 2019-06-22 RX ORDER — SUCRALFATE 1 G/1
1 TABLET ORAL ONCE
Status: COMPLETED | OUTPATIENT
Start: 2019-06-22 | End: 2019-06-22

## 2019-06-22 RX ORDER — 0.9 % SODIUM CHLORIDE 0.9 %
1000 INTRAVENOUS SOLUTION INTRAVENOUS ONCE
Status: COMPLETED | OUTPATIENT
Start: 2019-06-22 | End: 2019-06-22

## 2019-06-22 RX ADMIN — DICYCLOMINE HYDROCHLORIDE 10 MG: 10 CAPSULE ORAL at 20:54

## 2019-06-22 RX ADMIN — ONDANSETRON 4 MG: 2 INJECTION INTRAMUSCULAR; INTRAVENOUS at 20:54

## 2019-06-22 RX ADMIN — ORPHENADRINE CITRATE 60 MG: 30 INJECTION INTRAMUSCULAR; INTRAVENOUS at 23:42

## 2019-06-22 RX ADMIN — LIDOCAINE HYDROCHLORIDE: 20 SOLUTION ORAL; TOPICAL at 20:54

## 2019-06-22 RX ADMIN — MORPHINE SULFATE 2 MG: 2 INJECTION, SOLUTION INTRAMUSCULAR; INTRAVENOUS at 22:33

## 2019-06-22 RX ADMIN — IOPAMIDOL 85 ML: 755 INJECTION, SOLUTION INTRAVENOUS at 21:52

## 2019-06-22 RX ADMIN — SODIUM CHLORIDE 1000 ML: 9 INJECTION, SOLUTION INTRAVENOUS at 20:54

## 2019-06-22 RX ADMIN — SUCRALFATE 1 G: 1 TABLET ORAL at 20:54

## 2019-06-22 ASSESSMENT — ENCOUNTER SYMPTOMS
BACK PAIN: 1
VOMITING: 1
RHINORRHEA: 0
SHORTNESS OF BREATH: 0
WHEEZING: 0
COUGH: 0
SORE THROAT: 0
CONSTIPATION: 0
DIARRHEA: 0
COLOR CHANGE: 0
EYE DISCHARGE: 0
EYE REDNESS: 0
NAUSEA: 1

## 2019-06-22 ASSESSMENT — PAIN DESCRIPTION - LOCATION: LOCATION: ABDOMEN;FLANK

## 2019-06-22 ASSESSMENT — PAIN SCALES - GENERAL
PAINLEVEL_OUTOF10: 10
PAINLEVEL_OUTOF10: 8

## 2019-06-22 ASSESSMENT — PAIN DESCRIPTION - PAIN TYPE: TYPE: ACUTE PAIN

## 2019-06-22 NOTE — ED TRIAGE NOTES
Pt to ed with bilateral flank pain that radiates into the abd and pt reports hyperglycemia. Acc-check obtained and 206 mg/dl noted.  Pt rates pain 8/10 and denies taking medications prior to arrival.

## 2019-06-23 VITALS
BODY MASS INDEX: 30.16 KG/M2 | OXYGEN SATURATION: 97 % | RESPIRATION RATE: 18 BRPM | DIASTOLIC BLOOD PRESSURE: 78 MMHG | TEMPERATURE: 98.3 F | WEIGHT: 235 LBS | HEIGHT: 74 IN | HEART RATE: 78 BPM | SYSTOLIC BLOOD PRESSURE: 122 MMHG

## 2019-06-23 PROCEDURE — 93010 ELECTROCARDIOGRAM REPORT: CPT | Performed by: INTERNAL MEDICINE

## 2019-06-23 ASSESSMENT — ENCOUNTER SYMPTOMS: ABDOMINAL PAIN: 1

## 2019-06-23 NOTE — ED PROVIDER NOTES
[facility] Emergency [address] [place] [address]       Chief Complaint   Patient presents with    Flank Pain    Abdominal Pain       Nurses Notes reviewed and I agree except as noted in the HPI. HISTORY OF PRESENT ILLNESS    Starla Cedillo karri 52 y.o. male who presents to the ED for evaluation of progressive lower back pain and right sided rib pain that began 3-4 days ago. The patient reports a history of chronic intermittent lightheadedness and states that he fell approximately one week ago. He denies any head injury, LOC, or pain after this fall. However, the patient states that over the past 3 to 4 days, he has had pain in his lower back. He denies any radiation of this pain. He denies any weakness, numbness, tingling, loss of bladder bowel control, or saddle paresthesias. He also reports right upper quadrant abdominal pain for the past 3 to 4 days. The patient has a history of chronic nausea and vomiting and has had 2 episodes of vomiting today. He states that he takes phenergan to help control his nausea. He reports a history of IBS and reports chronic intermittent diarrhea and constipation that is currently at baseline. The patient denies any chest pain or shortness of breath. He has a documented history of hypertension, hyperlipidemia, chronic back pain, PE, VT with filter placement, diabetes, anemia, chronic bronchitis, liver diease, depression, bipolar disorder, GERD, IBS, colon polyp, and pancreatic insufficieny. The patient denies any other symptoms or relevant history at this time. HPI was provided by the patient. REVIEW OF SYSTEMS     Review of Systems   Constitutional: Negative for chills, fatigue and fever. HENT: Negative for congestion, ear pain, rhinorrhea and sore throat. Eyes: Negative for discharge and redness. Respiratory: Negative for cough, shortness of breath and wheezing. Cardiovascular: Negative for chest pain and palpitations.    Gastrointestinal: Positive for abdominal pain (RUQ), nausea and vomiting (2x today ). Negative for constipation and diarrhea. Genitourinary: Negative for decreased urine volume, difficulty urinating and dysuria. Musculoskeletal: Positive for back pain (lower). Negative for arthralgias, myalgias, neck pain and neck stiffness. Skin: Negative for color change, pallor and rash. Neurological: Positive for light-headedness (intermittent). Negative for dizziness, syncope, weakness, numbness and headaches. Hematological: Negative for adenopathy. Psychiatric/Behavioral: Negative for agitation, confusion, dysphoric mood and suicidal ideas. The patient is not nervous/anxious. PAST MEDICAL HISTORY     Past Medical History:   Diagnosis Date    Acid reflux     Anemia     previously seen by Dr. Mariano Olsen (due to enlarged spleen)    Bipolar disorder ([facility]     Blood clot in vein 4/7/16    Sherran Laughter     Chest pain     previously seeing [facility] cardiologist, now seeing Dr. Dayan Means (LAD bridging on cath 4/2016)    Chronic back pain     Chronic bronchitis ([facility]     Dr. Kayla Mir 10/2012 - no longer following with him    Colon polyp 08/30/2016    Depression     Dr. Canales Bone DVT (deep venous thrombosis) ([facility] [id number]    not on Coumadin due to unable to regulate - Dr. Hawa Guerra - no etiology found per patient    Esophageal abnormality     nodule     Fatty liver disease, nonalcoholic     [facility] 25[facility]    Furunc     legs    History of Doppler ultrasound 5-29-11    No hemodynamically significant carotid stenosis is identified. A thyroid nodule on each side. Dedicated ultrasound of thyroid gland is suggested to further evaluate if clinically indicated.      Hx of blood clots     Hyperlipidemia     severely elevated triglycerides    Hypertension     diastolic    Intracranial arachnoid cyst 11/2012    Dr. Trish Madden drained, complicated with seizures, DKA    Irritable bowel syndrome     Liver disease     Candi Sang - elevated LFT - positive smooth muscle antibody, steatosis per liver bx 3/2014 with Dr. Mame Singh (multiple drug resistant organisms) resistance 2012    MRSA (methicillin resistant staph aureus) culture positive     h/o in foot and before brain surgery    Nephrolithiasis     noted on CT abdomen 9/2016    Neuropathy     Pancreatic insufficiency     Positive SANDY (antinuclear antibody)     Dr. Onel Gould - first visit in 6/2013    Pulmonary embolism ([address] 2007    s/p GFF, related to knee surgery    Restless legs syndrome     Seizures ([address]     Sinus tachycardia     Holter 3/2013 - seeing Dr. Felicitas Sanabria [facility])     clips     Sleep apnea     multiple MD's suggested testing but he refuses to be tested as claustraphobic and won't use Cpap    Type II or unspecified type diabetes mellitus without mention of complication, not stated as uncontrolled 2007       SURGICALHISTORY      has a past surgical history that includes knee surgery (Left, 2007); Vena Cava Filter Placement (2007); hernia repair (Right, 1996); Cholecystectomy (2004); Upper gastrointestinal endoscopy (2012); transthoracic echocardiogram (3-05-11); other surgical history (5-31-11); Cardiac catheterization; Cardiac catheterization (3-2012); craniotomy (11/2012); Tonsillectomy; Endoscopy, colon, diagnostic; EKG 12 Lead (10/18/2015); Knee arthroscopy (Right, 2016); Cardiac catheterization (04/28/2016); Upper gastrointestinal endoscopy (2016); Inguinal hernia repair (Right, 09/15/2016); Colonoscopy (2012); Colonoscopy (08/2016); other surgical history (01/20/2017); shoulder surgery (Right, 01/2007); and Carpal tunnel release (01/2017). CURRENT MEDICATIONS       Discharge Medication List as of 6/22/2019 11:10 PM      CONTINUE these medications which have NOT CHANGED    Details   gabapentin (NEURONTIN) 600 MG tablet Take 1 tablet by mouth 4 times daily for 180 days. , Disp-360 tablet, R-1Normal      Melatonin 10 MG TABS Take 30 mg of ampicillin by mouth clindamycin (CLEOCIN T) 1 % external solution Apply topically 2 times daily. , Disp-30 mL, R-5, Normal      levETIRAcetam (KEPPRA) 500 MG tablet Take 2,000 mg by mouth 2 times daily       LORazepam (ATIVAN) 0.5 MG tablet Take 0.5 mg by mouth 2 times daily as needed (seizures)       zonisamide (ZONEGRAN) 100 MG capsule Take 500 mg by mouth nightly Historical Med             ALLERGIES     is allergic to ciprofloxacin-ciproflox hcl er; heparin; metformin; niacin and related; tramadol hcl; ultram [tramadol]; and warfarin. FAMILY HISTORY     indicated that his mother is alive. He indicated that his father is . He indicated that his sister is alive. He indicated that three of his five brothers are alive. He indicated that the status of his daughter is unknown.   family history includes Arthritis in his mother; Diabetes in his daughter; Heart Disease (age of onset: 61) in his father; Obesity in his brother and sister; Other in his brother; Seizures in his brother and mother; Stroke in his brother.     SOCIAL HISTORY       Social History     Socioeconomic History    Marital status:      Spouse name: Not on file    Number of children: 3    Years of education: Not on file    Highest education level: Not on file   Occupational History    Occupation: Disability since    Social Needs    Financial resource strain: Not on file    Food insecurity:     Worry: Not on file     Inability: Not on file   [employer] needs:     Medical: Not on file     Non-medical: Not on file   Tobacco Use    Smoking status: Never Smoker    Smokeless tobacco: Never Used   Substance and Sexual Activity    Alcohol use: No     Alcohol/week: 0.0 oz    Drug use: No    Sexual activity: Not on file   Lifestyle    Physical activity:     Days per week: Not on file     Minutes per session: Not on file    Stress: Not on file   Relationships    Social connections:     Talks on phone: Not on file     Gets together: Not on file     Attends [employer] service: Not on file     Active member of club or organization: Not on file     Attends meetings of clubs or organizations: Not on file     Relationship status: Not on file    Intimate partner violence:     Fear of current or ex partner: Not on file     Emotionally abused: Not on file     Physically abused: Not on file     Forced sexual activity: Not on file   Other Topics Concern    Not on file   Social History Narrative    Not on file       PHYSICAL EXAM     INITIAL VITALS:  height is 6' 2\" (1.88 m) and weight is 235 lb (106.6 kg). His oral temperature is 98.3 °F (36.8 °C). His blood pressure is 122/78 and his pulse is 78. His respiration is 18 and oxygen saturation is 97%. Physical Exam   Constitutional: He is oriented to person, place, and time. He appears well-developed and well-nourished. No distress. HENT:   Head: Normocephalic and atraumatic. Right Ear: External ear normal.   Left Ear: External ear normal.   Nose: Nose normal.   Mouth/Throat: Oropharynx is clear and moist.   Eyes: Pupils are equal, round, and reactive to light. Conjunctivae and EOM are normal. Right eye exhibits no discharge. Left eye exhibits no discharge. No scleral icterus. Neck: Normal range of motion. Neck supple. Cardiovascular: Normal rate, regular rhythm and normal heart sounds. Exam reveals no gallop and no friction rub. No murmur heard. Pulmonary/Chest: Effort normal and breath sounds normal. No stridor. No respiratory distress. He has no wheezes. He has no rales. He exhibits no tenderness. Abdominal: Soft. Bowel sounds are normal. He exhibits no distension and no mass. There is no hepatosplenomegaly. There is tenderness (mild) in the epigastric area. There is no rigidity, no rebound, no guarding, no tenderness at McBurney's point and negative Montes's sign. No hernia. Musculoskeletal: Normal range of motion. He exhibits no edema.         Cervical back: Normal. He exhibits normal range of motion, no tenderness, no swelling, no deformity, no pain and no spasm. Thoracic back: Normal. He exhibits normal range of motion, no tenderness, no swelling, no deformity, no pain and no spasm. Lumbar back: Normal. He exhibits normal range of motion, no tenderness, no swelling, no deformity, no pain and no spasm. There is no midline spinal or paraspinal tenderness of the cervical, thoracic, or lumbar spine. Patient has full ROM and strength of the lower extremities. There is intact sensation of soft touch in the lower extremities. The lower extremities appear to be neurovascularly intact. Lymphadenopathy:     He has no cervical adenopathy. Neurological: He is alert and oriented to person, place, and time. He exhibits normal muscle tone. Coordination normal. GCS eye subscore is 4. GCS verbal subscore is 5. GCS motor subscore is 6. Skin: Skin is warm and dry. No rash noted. He is not diaphoretic. No erythema. No pallor. Psychiatric: He has a normal mood and affect. His behavior is normal. Thought content normal.   Nursing note and vitals reviewed. DIFFERENTIAL DIAGNOSIS:   Including but not limited to: Gastritis, gastroenteritis, enteritis, colitis, diverticulitis, mesenteric adenitis, hepatitis, pancreatitis, pyelonephritis, UTI, IBS, IBD, PUD, GERD, paraspinal strain vs sprain vs paraspinal muscle spasm, herniated disc, sciatica, degenerative disc disease.  Low suspicion for: vertebral subluxation or fracture, cauda equina, discitis, epidural abscess, central canal compromise, or AAA based on history and physical exam.    DIAGNOSTIC RESULTS     EKG: All EKG's are interpreted by the Emergency Department Physician who eithersigns or Co-signs this chart in the absence of a cardiologist.    EKG Dizziness (Preliminary result)    Component (Lab Inquiry)   Collection Time Result Time Ventricular Rate Atrial Rate P-R Interval QRS Duration Q-T Interval   06/22/19 20:58:59 06/22/19 22:40:56 81 81 174 96 360       Collection Time Result Time QTc Calculation (Bazett) P Axis R Axis T Axis   06/22/19 20:58:59 06/22/19 22:40:56 418 42 35 -6         Preliminary result                Narrative:    Normal sinus rhythm  Nonspecific T wave abnormality  Abnormal ECG  When compared with ECG of 06-DEC-2018 15:14,  Non-specific change in ST segment in Inferior leads  Nonspecific T wave abnormality now evident in Inferior leads  Nonspecific T wave abnormality now evident in Anterior leads              RADIOLOGY: non-plainfilm images(s) such as CT, Ultrasound and MRI are read by the radiologist.  Plain radiographic images are visualized and preliminarily interpreted by the emergency physician unless otherwise stated below. CT ABDOMEN PELVIS W IV CONTRAST Additional Contrast? None   Final Result   1. Visualized right L5-S1 pars interarticularis fracture without dislocation. Visualized IVC filter. 2. Post cholecystectomy changes and mild fatty liver appearance. 3. Bilateral, punctate, nonobstructing renal calculi. CT lumbar spine without contrast.      INDICATION: Back pain history of recent trauma      TECHNIQUE: Noncontrast multiplanar reformatted images are reviewed      FINDINGS: There is preservation of alignment of the vertebral column. Vertebral body height is maintained. The paraspinal soft tissues are grossly normal. There is an inferior vena cava filter noted. Sacroiliac joints and arcuate structures appear    normal.      There is a right-sided pars defect through the medial cortex of the lamina. It involves the articular facet of the L5-S1 segment      Disc spaces: L1-2, L2-3: The disc spaces, central canal and foramen are unremarkable. 3. 4: There is mild degenerative disc bulge without central canal or foramen impingement      L4-5: The disc space is slightly narrow. The central canal and foramen remain patent.       L5-S1: There is right-sided pars interarticularis nondisplaced fracture changes of the right articular facet. There is no acute deformity of the central canal. The foramen remain patent. IMPRESSION:   1. Right L5-S1 pars interarticularis fracture without dislocation. The central canal contents remain intact. 2. Degenerative changes of the lumbar spine described above. **This report has been created using voice recognition software. It may contain minor errors which are inherent in voice recognition technology. **      Final report electronically signed by Dr. Khushbu Morgan on 6/22/2019 10:17 PM      CT LUMBAR RECONSTRUCTION WO POST PROCESS   Final Result   1. Visualized right L5-S1 pars interarticularis fracture without dislocation. Visualized IVC filter. 2. Post cholecystectomy changes and mild fatty liver appearance. 3. Bilateral, punctate, nonobstructing renal calculi. CT lumbar spine without contrast.      INDICATION: Back pain history of recent trauma      TECHNIQUE: Noncontrast multiplanar reformatted images are reviewed      FINDINGS: There is preservation of alignment of the vertebral column. Vertebral body height is maintained. The paraspinal soft tissues are grossly normal. There is an inferior vena cava filter noted. Sacroiliac joints and arcuate structures appear    normal.      There is a right-sided pars defect through the medial cortex of the lamina. It involves the articular facet of the L5-S1 segment      Disc spaces: L1-2, L2-3: The disc spaces, central canal and foramen are unremarkable. 3. 4: There is mild degenerative disc bulge without central canal or foramen impingement      L4-5: The disc space is slightly narrow. The central canal and foramen remain patent. L5-S1: There is right-sided pars interarticularis nondisplaced fracture changes of the right articular facet. There is no acute deformity of the central canal. The foramen remain patent. IMPRESSION:   1. Right L5-S1 pars interarticularis fracture without dislocation. The central canal contents remain intact. 2. Degenerative changes of the lumbar spine described above. **This report has been created using voice recognition software. It may contain minor errors which are inherent in voice recognition technology. **      Final report electronically signed by Dr. Karina Mehta on 6/22/2019 10:17 PM            LABS:   Labs Reviewed   BASIC METABOLIC PANEL - Abnormal; Notable for the following components:       Result Value    Glucose 123 (*)     All other components within normal limits   URINE RT REFLEX TO CULTURE - Abnormal; Notable for the following components:    Glucose, Ur >= 1000 (*)     Specific Gravity, Urine > 1.030 (*)     All other components within normal limits   BLOOD GAS, ARTERIAL - Abnormal; Notable for the following components:    PO2 67 (*)     All other components within normal limits   POCT GLUCOSE - Abnormal; Notable for the following components:    POC Glucose 206 (*)     All other components within normal limits   POCT GLUCOSE - Abnormal; Notable for the following components:    POC Glucose 120 (*)     All other components within normal limits   POCT GLUCOSE - Normal   POCT GLUCOSE - Normal   CBC WITH AUTO DIFFERENTIAL   HEPATIC FUNCTION PANEL   LIPASE   TROPONIN   MAGNESIUM   PHOSPHORUS   BETA-HYDROXYBUTYRATE   BRAIN NATRIURETIC PEPTIDE   GLOMERULAR FILTRATION RATE, ESTIMATED   OSMOLALITY   ANION GAP   GLUCOSE, WHOLE BLOOD   URINE DRUG SCREEN       EMERGENCY DEPARTMENT COURSE:   Vitals:    Vitals:    06/22/19 2116 06/22/19 2235 06/22/19 2335 06/23/19 0045   BP: 133/73 123/82 120/80 122/78   Pulse: 93  82 78   Resp: 18      Temp:       TempSrc:       SpO2: 94% 96% 97% 97%   Weight:       Height:         MDM  The patient was seen and evaluated within the ED today with lower back pain and right upper quadrant abdominal pain. Within the department, I observed the patient's vital signs to be within acceptable range, and he was afebrile.   On exam, I appreciated mild right upper quadrant abdominal tenderness without guarding, rigidity, or rebound tenderness. There is no midline spinal or paraspinal tenderness of the cervical, thoracic, or lumbar spine. Patient has full ROM and strength of the lower extremities. There is intact sensation of soft touch in the lower extremities. The lower extremities appear to be neurovascularly intact. Radiologic studies within the department revealed an L5-S1 pars interarticularis fracture without dislocation. There is no acute inflammatory or infectious processes of the abdomen or pelvis. Laboratory work was reassuring. BG was 206 on arrival but then dropped to 76. Patient admitted taking 30 units of insulin prior to arrival without eating. He was fed, and BG increased to 120. Within the department, the patient was treated with IV saline, Bentyl, Zofran, Carafate, GI cocktail, morphine, and Norflex  I observed the patient's condition to improve during the duration of the stay. Re-exam of his abdomen was benign, and his abdomen remained soft. I explained my proposed course of treatment to the patient, who was amenable to my treatment and discharge decisions. He was discharged home in stable condition with prescriptions for Norco, Flexeril, and Lidoderm patches, and the patient will return to the ED if the symptoms become more severe in nature or otherwise change. I discussed with the patient that Flexeril is sedating and advised the patient to caution while driving, operating heavy machinery, or drinking alcohol. Patient vocalized understanding. He will otherwise follow-up with Dr. Janusz Abdi, for further evaluation and management. Prior to discharge, the patient reported progressively worsening lightheadedness and confusion. He states that there was an episode at least a week ago where he states that he did not wake up for 18 hours.   He states that he is scheduled to follow-up with his PCP in 2 days for these complaints. He also reported left shoulder pain that he initially did not report to me, but he refused the offered x-ray. I discussed these additional complaints my attending physician, Dr. Elida Simon, who advised that the patient be discharged home for outpatient follow-up with his PCP at the appointment that he is already established for these complaints. Dr. Elida Simon does not advise any additional work-up at this time. CRITICAL CARE:   None    CONSULTS:  Discussed the case with my attending physician in the Emergency Department, who agreed with my workup, treatment, and disposition decisions. Dr. Kelli Pool, [address] [facility] discharge to home and outpatient follow-up after discussing the patient's lumbar CT findings    PROCEDURES:  None    FINAL IMPRESSION     1. Closed fracture of fifth lumbar vertebra, unspecified fracture morphology, [facility]    2. Abdominal pain, right upper quadrant          DISPOSITION/PLAN   I have given the patient strict written and verbal instructions about care at home, follow-up, and signs and symptoms of worsening of condition, and the patient did verbalize understanding of these instructions. Patient was discharged in stable condition. Will return if symptoms change or worsen, or for any sign or symptom deemed emergent by the patient or family members. Follow up as an outpatient or sooner if symptoms warrant. PATIENT REFERREDTO:  Rowan Camejo MD  [address]  [phone]    Call in 2 days  As needed, If symptoms worsen      DISCHARGE MEDICATIONS:  Discharge Medication List as of 6/22/2019 11:10 PM      START taking these medications    Details   HYDROcodone-acetaminophen (NORCO) 5-325 MG per tablet Take 1 tablet by mouth every 6 hours as needed for Pain for up to 3 days. Intended supply: 3 days.  Take lowest dose possible to manage pain, Disp-12 tablet, R-0Print      cyclobenzaprine (FLEXERIL) 10 MG tablet Take 1 tablet by mouth 3 times daily as needed for Muscle spasms, Disp-15 tablet, R-0Print      lidocaine (LIDODERM) 5 % Place 1 patch onto the skin daily for 10 days 12 hours on, 12 hours off., Disp-10 patch, R-0Print             (Please note that portions of this note were completed with a voice recognition program.  Efforts were made to edit the dictations but occasionally words are mis-transcribed.)    Scribe:  Montana Vallecillo 6/22/19 8:28 PM Scribing for and in the presence of Shelton Toro [facility]. Signed by: Sylwia Richey, 06/23/19 11:58 AM    Provider:  I personally performed the services described in the documentation,reviewed and edited the documentation which was dictated to the scribe in my presence, and it accurately records my words and actions.     Shelton Toro [facility]  06/23/19 11:58 AM    MAGED Del Rio PA-C  06/23/19 [id number]

## 2019-06-24 ENCOUNTER — OFFICE VISIT (OUTPATIENT)
Dept: INTERNAL MEDICINE CLINIC | Age: 49
End: 2019-06-24
Payer: MEDICARE

## 2019-06-24 VITALS
WEIGHT: 246.5 LBS | BODY MASS INDEX: 31.63 KG/M2 | HEIGHT: 74 IN | SYSTOLIC BLOOD PRESSURE: 108 MMHG | DIASTOLIC BLOOD PRESSURE: 72 MMHG | HEART RATE: 84 BPM

## 2019-06-24 DIAGNOSIS — E04.1 THYROID NODULE: ICD-10-CM
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
## 2019-06-24 DIAGNOSIS — E11.65 TYPE 2 DIABETES MELLITUS WITH HYPERGLYCEMIA, WITH LONG-TERM CURRENT USE OF INSULIN (HCC): Primary | ICD-10-CM

## 2019-06-24 DIAGNOSIS — G62.9 NEUROPATHY: ICD-10-CM

## 2019-06-24 DIAGNOSIS — Z79.4 TYPE 2 DIABETES MELLITUS WITH HYPERGLYCEMIA, WITH LONG-TERM CURRENT USE OF INSULIN (HCC): Primary | ICD-10-CM

## 2019-06-24 PROCEDURE — 3046F HEMOGLOBIN A1C LEVEL >9.0%: CPT | Performed by: INTERNAL MEDICINE

## 2019-06-24 PROCEDURE — G8599 NO ASA/ANTIPLAT THER USE RNG: HCPCS | Performed by: INTERNAL MEDICINE

## 2019-06-24 PROCEDURE — 99213 OFFICE O/P EST LOW 20 MIN: CPT | Performed by: INTERNAL MEDICINE

## 2019-06-24 PROCEDURE — 2022F DILAT RTA XM EVC RTNOPTHY: CPT | Performed by: INTERNAL MEDICINE

## 2019-06-24 PROCEDURE — G8427 DOCREV CUR MEDS BY ELIG CLIN: HCPCS | Performed by: INTERNAL MEDICINE

## 2019-06-24 PROCEDURE — G8417 CALC BMI ABV UP PARAM F/U: HCPCS | Performed by: INTERNAL MEDICINE

## 2019-06-24 PROCEDURE — 1036F TOBACCO NON-USER: CPT | Performed by: INTERNAL MEDICINE

## 2019-06-24 RX ORDER — DULOXETIN HYDROCHLORIDE 30 MG/1
30 CAPSULE, DELAYED RELEASE ORAL DAILY
COMMUNITY
End: 2019-09-30 | Stop reason: DRUGHIGH

## 2019-06-24 NOTE — PROGRESS NOTES
Vivienne 10 to look into that. Dr Carlos Salas did MRI cervical/thoracic spine too - stable chronic mild issues. He has GI appt to discus abdominal pain with eating and is taking pancreatic enzyme the last 1 month - stools and pain not better. To see GI in June. Pain in big toe bilaterally - not hot or red. Pain is deep in toe - sharp intermittent - likely neuropathy. Highly doubt gout as he suspected. Offered Uric acid level but he refused. CT neck noted enlarged submental LN - I treated with antibiotic and repeat U/S of neck and under chin when checked thyroid 12/2018 - it noted LN and supported reactive process. No enlarged LN on exam today to bx. Offered ultrasound of axilla as had tender spots but he declined for now. Hyperlipidemia - LDL 98 and Trig 359 11/2018. Continue Lipitor. I believe he states insurance wouldn't fill Lovaza and on trying to get fenofibrate. Bilateral feet pain (DM neuropathy) severe - no longer having tingling/numbness - now just pain - Dr. Dimitris Flood has no more options for him per patient. Fatigue - likely due to uncontrolled DM, poor sleep. Will check vitamin D and B12/folate. Hg normal 12/2108, TSH normal 12/2018. Enlarged left submental lymph node and right thyroid nodule with coarse rim calcifications on CT 7/2018. Will start antibiotic x 2 weeks as felt likely due to poor dentition (all teeth broken off in front and discolored) and order CT or ultrasound to follow up - want to discuss with radiology on which test to order. Nausea - encouraged follow up with GI - he will consider it. He is depressed, feels like this has been worked up before and no answer. He did not follow through with gastric emptying study ordered in hospital.  He had CT abd in ER and negative. Need better control of DM. Continue PPI. Vitamin D deficiency - on 6000 IU daily and level 20 4/2018. Increased to 4000 IU BID. Check level now.     He is complaining of SURGICAL HISTORY  01/20/2017    RIGHT SHOULDER ARTHROSCOPY, OPEN STAN, OPEN ACROMIOPLASTY, BICEP TENDESIS, RIGHT CARPAL TUNNEL RELEASE    SHOULDER SURGERY Right 01/2007    TONSILLECTOMY      TRANSTHORACIC ECHOCARDIOGRAM  3-05-11    LV size and systolic function normal. EF 55-65%.  UPPER GASTROINTESTINAL ENDOSCOPY  2012    UPPER GASTROINTESTINAL ENDOSCOPY  2016    Dr. Keith Nava  2007       Current Outpatient Medications   Medication Sig Dispense Refill    DULoxetine (CYMBALTA) 30 MG extended release capsule Take 30 mg by mouth daily      gabapentin (NEURONTIN) 600 MG tablet Take 1 tablet by mouth 4 times daily for 180 days. 360 tablet 1    Melatonin 10 MG TABS Take 30 mg by mouth nightly       DULoxetine (CYMBALTA) 60 MG extended release capsule Take 60 mg by mouth daily      lacosamide (VIMPAT) 100 MG TABS tablet Take 300 mg by mouth 2 times daily.  Cyanocobalamin (VITAMIN B-12) 2500 MCG SUBL Place 2,500 mcg under the tongue daily      sildenafil (VIAGRA) 100 MG tablet Take 1 tablet by mouth as needed for Erectile Dysfunction 45 tablet 3    metoprolol tartrate (LOPRESSOR) 50 MG tablet Take 1 tablet by mouth 2 times daily (Patient taking differently: Take 75 mg by mouth 2 times daily ) 180 tablet 1    divalproex (DEPAKOTE) 500 MG DR tablet Take 500 mg by mouth 2 times daily       Blood Glucose Monitoring Suppl (ONE TOUCH BASIC SYSTEM) W/DEVICE KIT Use to test blood glucose level 4 times per day. Diagnosis: E11.40 1 kit 0    clindamycin (CLEOCIN T) 1 % external solution Apply topically 2 times daily.  30 mL 5    levETIRAcetam (KEPPRA) 500 MG tablet Take 2,000 mg by mouth 2 times daily       LORazepam (ATIVAN) 0.5 MG tablet Take 0.5 mg by mouth 2 times daily as needed (seizures)       zonisamide (ZONEGRAN) 100 MG capsule Take 500 mg by mouth nightly       cyclobenzaprine (FLEXERIL) 10 MG tablet Take 10 mg by mouth 3 times daily as needed for Muscle spasms      intermittent chronic abdominal pain, change in bowel habits, or black or bloody stools, positive nausea, intermittent vomiting  Genito-Urinary ROS: no dysuria, trouble voiding, or hematuria - increased frequency, urgency  Neurological ROS: negative for - headaches, or weakness, positive numbness/pain in feet/legs, and seizures  Dermatological ROS: negative for - rash or skin lesion changes, except chronic rash on legs    Blood pressure 108/72, pulse 84, height 6' 2\" (1.88 m), weight 246 lb 8 oz (111.8 kg). Physical Examination: General appearance -alert, well appearing, and in no distress  Mental status - alert, oriented to person, place, and time  Neck - supple, no significant adenopathy  Chest - clear to auscultation, positive wheezes, no rales or rhonchi, symmetric air entry  Heart - normal rate, regular rhythm, normal S1, S2, no murmurs, rubs, clicks or gallops  Abdomen -soft, nontender, nondistended  Extremities - peripheral pulses normal, no pedal edema, no clubbing or cyanosis  Neurological - alert, oriented  Skin - warm and dry    Foot exam 4/16/19: no foot lesions, sensation decreased to level of mid shin with monofilament testing.  +2 DP and PT pulses bilaterally. Diagnostic Data:  Reviewed labs from 4-6/2019 with patient. Assessment and Plan:     Diagnosis Orders   1. Type 2 diabetes mellitus with hyperglycemia, with long-term current use of insulin ([facility] [address]     2. Neuropathy     3. Thyroid nodule     4. Fall, initial encounter       DM - not controlled but not eating regularly or eating a DM diet. Advised to increase Novolog and Levemir as above. Again stressed importance of eating regular meals - previously suggested protein drinks just to have a little input at breakfast.   He is to work on eating every 4 hours - just small amounts, take insulin as prescribed. He is to send in FSBS in a couple weeks to adjust insulin further.   Need eye exam.    Neuropathy - severe, continue

## 2019-06-25 ENCOUNTER — CARE COORDINATION (OUTPATIENT)
Dept: CASE MANAGEMENT | Age: 49
End: 2019-06-25

## 2019-06-25 NOTE — CARE COORDINATION
Care Transition  ED Follow up Call    Reason for ED visit:  Flank pain, abdominal pain, nausea, vomiting, lightheadedness    How are you feeling? \"no different\"  Status:     not changed      Do you have any questions related to your discharge instructions? no    Review of Instructions:    Discharged with new prescription? yes - flexeril, norco, lidoderm   Review Medications:  Yes   Do you have any questions related to your medications? no    Understands what to report/when to return?:  Yes   Do you have a follow up appointment scheduled? Yes  Specific review if indicated (symptom, services, etc): Spoke with Renata Dow, introduced self/role. Reports feeling \"no different\". Had PCP appointment yesterday. Has appointment with Dr. Kelli Pool tomorrow. Reviewed insulin changes, verbalized understanding. Understands s/s of hypo and hyperglycemia. Continues to decline dietician services. States he was unable to fill lidoderm patch d/t prior authorization, informed he can buy OTC 4 % instead of 5%, verbalized understanding. Denies further needs/assistance/concerns/questions at this time.        Do you have any needs or concerns I can assist you with? no     FU appts/Provider:    Future Appointments   Date Time Provider Staci Jay   8/6/2019 11:00 AM Paresh Ken MD [facility] GENEVA Chavis RN, [place]  [phone]

## 2019-07-16 ENCOUNTER — CARE COORDINATION (OUTPATIENT)
Dept: CARE COORDINATION | Age: 49
End: 2019-07-16

## 2019-07-16 NOTE — LETTER
7/16/2019    Karolina Abreu  [facility], [facility] [facility] [address]    Dear Karolina Abreu,    I enjoyed speaking with you and wanted to send some additional information. Anthony Garrison MD and I will work together to ensure your needs are met and help you achieve your health goals. We are committed to walk with you on this journey and look forward to working with you. Please feel free to contact me with any questions or concerns. I am available by phone or for appointments at the office. You can reach me at [phone].       In good health,     Kaye Matias RN

## 2019-07-16 NOTE — CARE COORDINATION
Spoke with Joshua Barbosa 26 re: insulin as med assist is seeing pt has not met deductible for the year. Checked to see when insulins last filled. Informed per pharmacy records that last time pt picked up Novolog or Abhi Mayberry was in Dec. 2018. Informed that script from April was received and filled, but pt never picked up. Discussed with PCP concerns re: pt's elevated BS's and concerns re: medications. Aware referral made to med assistance.

## 2019-07-17 ENCOUNTER — TELEPHONE (OUTPATIENT)
Dept: PHARMACY | Age: 49
End: 2019-07-17

## 2019-07-18 ENCOUNTER — CARE COORDINATION (OUTPATIENT)
Dept: CARE COORDINATION | Age: 49
End: 2019-07-18

## 2019-07-25 ENCOUNTER — CARE COORDINATION (OUTPATIENT)
Dept: CARE COORDINATION | Age: 49
End: 2019-07-25

## 2019-08-01 ENCOUNTER — HOSPITAL ENCOUNTER (OUTPATIENT)
Dept: GENERAL RADIOLOGY | Age: 49
Discharge: HOME | End: 2019-08-01
Payer: MEDICARE

## 2019-08-01 ENCOUNTER — HOSPITAL ENCOUNTER (OUTPATIENT)
Age: 49
Discharge: HOME | End: 2019-08-01
Payer: MEDICARE

## 2019-08-01 DIAGNOSIS — S32.050S CLOSED COMPRESSION FRACTURE OF FIFTH LUMBAR VERTEBRA, SEQUELA: ICD-10-CM

## 2019-08-01 PROCEDURE — 72114 X-RAY EXAM L-S SPINE BENDING: CPT

## 2019-08-07 ENCOUNTER — TELEPHONE (OUTPATIENT)
Dept: PHARMACY | Age: 49
End: 2019-08-07

## 2019-08-07 ENCOUNTER — CARE COORDINATION (OUTPATIENT)
Dept: CARE COORDINATION | Age: 49
End: 2019-08-07

## 2019-08-07 NOTE — CARE COORDINATION
Attempted to reach Maritza Rivas today for f/u. No answer. Phone call came in under same number. Answered call and no one responding.

## 2019-08-12 ENCOUNTER — APPOINTMENT (OUTPATIENT)
Dept: CT IMAGING | Age: 49
DRG: 638 | End: 2019-08-12
Payer: MEDICARE

## 2019-08-12 ENCOUNTER — HOSPITAL ENCOUNTER (INPATIENT)
Age: 49
LOS: 1 days | Discharge: HOME | DRG: 638 | End: 2019-08-13
Attending: FAMILY MEDICINE | Admitting: INTERNAL MEDICINE
Payer: MEDICARE

## 2019-08-12 ENCOUNTER — TELEPHONE (OUTPATIENT)
Dept: INTERNAL MEDICINE CLINIC | Age: 49
End: 2019-08-12

## 2019-08-12 ENCOUNTER — OFFICE VISIT (OUTPATIENT)
Dept: INTERNAL MEDICINE CLINIC | Age: 49
End: 2019-08-12
Payer: MEDICARE

## 2019-08-12 ENCOUNTER — NURSE ONLY (OUTPATIENT)
Dept: LAB | Age: 49
End: 2019-08-12

## 2019-08-12 ENCOUNTER — APPOINTMENT (OUTPATIENT)
Dept: GENERAL RADIOLOGY | Age: 49
DRG: 638 | End: 2019-08-12
Payer: MEDICARE

## 2019-08-12 VITALS
HEART RATE: 104 BPM | SYSTOLIC BLOOD PRESSURE: 126 MMHG | DIASTOLIC BLOOD PRESSURE: 84 MMHG | BODY MASS INDEX: 29.07 KG/M2 | HEIGHT: 74 IN | WEIGHT: 226.5 LBS

## 2019-08-12 DIAGNOSIS — N20.0 NEPHROLITHIASIS: ICD-10-CM

## 2019-08-12 DIAGNOSIS — R10.9 BILATERAL FLANK PAIN: ICD-10-CM

## 2019-08-12 DIAGNOSIS — G25.81 RESTLESS LEGS SYNDROME (RLS): ICD-10-CM

## 2019-08-12 DIAGNOSIS — R63.4 WEIGHT LOSS: ICD-10-CM

## 2019-08-12 DIAGNOSIS — E11.65 TYPE 2 DIABETES MELLITUS WITH HYPERGLYCEMIA, WITH LONG-TERM CURRENT USE OF INSULIN (HCC): Primary | ICD-10-CM

## 2019-08-12 DIAGNOSIS — Z79.4 TYPE 2 DIABETES MELLITUS WITH DIABETIC NEUROPATHY, WITH LONG-TERM CURRENT USE OF INSULIN (HCC): ICD-10-CM

## 2019-08-12 DIAGNOSIS — K21.9 GASTROESOPHAGEAL REFLUX DISEASE WITHOUT ESOPHAGITIS: ICD-10-CM

## 2019-08-12 DIAGNOSIS — R68.89 HEAT INTOLERANCE: ICD-10-CM

## 2019-08-12 DIAGNOSIS — E11.65 TYPE 2 DIABETES MELLITUS WITH HYPERGLYCEMIA, WITH LONG-TERM CURRENT USE OF INSULIN (HCC): ICD-10-CM

## 2019-08-12 DIAGNOSIS — E11.40 TYPE 2 DIABETES MELLITUS WITH DIABETIC NEUROPATHY, WITH LONG-TERM CURRENT USE OF INSULIN (HCC): ICD-10-CM

## 2019-08-12 DIAGNOSIS — Z79.4 TYPE 2 DIABETES MELLITUS WITH HYPERGLYCEMIA, WITH LONG-TERM CURRENT USE OF INSULIN (HCC): Primary | ICD-10-CM

## 2019-08-12 DIAGNOSIS — E55.9 VITAMIN D DEFICIENCY: ICD-10-CM

## 2019-08-12 DIAGNOSIS — R10.9 ABDOMINAL PAIN, UNSPECIFIED ABDOMINAL LOCATION: ICD-10-CM

## 2019-08-12 DIAGNOSIS — E78.2 MIXED HYPERLIPIDEMIA: ICD-10-CM

## 2019-08-12 DIAGNOSIS — Z79.4 TYPE 2 DIABETES MELLITUS WITH HYPERGLYCEMIA, WITH LONG-TERM CURRENT USE OF INSULIN (HCC): ICD-10-CM

## 2019-08-12 DIAGNOSIS — E87.6 HYPOKALEMIA: ICD-10-CM

## 2019-08-12 DIAGNOSIS — K40.90 RIGHT INGUINAL HERNIA: ICD-10-CM

## 2019-08-12 LAB
ALBUMIN SERPL-MCNC: 4.2 G/DL (ref 3.5–5.1)
ALBUMIN SERPL-MCNC: 4.4 G/DL (ref 3.5–5.1)
ALLEN TEST: POSITIVE
ALP BLD-CCNC: 91 U/L (ref 38–126)
ALP BLD-CCNC: 97 U/L (ref 38–126)
ALT SERPL-CCNC: 21 U/L (ref 11–66)
ALT SERPL-CCNC: 23 U/L (ref 11–66)
AMYLASE: 15 U/L (ref 20–104)
ANION GAP SERPL CALCULATED.3IONS-SCNC: 17 MEQ/L (ref 8–16)
ANION GAP SERPL CALCULATED.3IONS-SCNC: 21 MEQ/L (ref 8–16)
AST SERPL-CCNC: 14 U/L (ref 5–40)
AST SERPL-CCNC: 15 U/L (ref 5–40)
AVERAGE GLUCOSE: 246 MG/DL (ref 70–126)
BACTERIA: ABNORMAL
BASE EXCESS (CALCULATED): 0.8 MMOL/L (ref -2.5–2.5)
BASOPHILS # BLD: 0.4 %
BASOPHILS ABSOLUTE: 0 THOU/MM3 (ref 0–0.1)
BETA-HYDROXYBUTYRATE: 1.45 MG/DL (ref 0.2–2.81)
BILIRUB SERPL-MCNC: 1.9 MG/DL (ref 0.3–1.2)
BILIRUB SERPL-MCNC: 3 MG/DL (ref 0.3–1.2)
BILIRUBIN DIRECT: 0.4 MG/DL (ref 0–0.3)
BILIRUBIN URINE: NEGATIVE
BLOOD, URINE: NEGATIVE
BUN BLDV-MCNC: 6 MG/DL (ref 7–22)
BUN BLDV-MCNC: 6 MG/DL (ref 7–22)
CALCIUM SERPL-MCNC: 9 MG/DL (ref 8.5–10.5)
CALCIUM SERPL-MCNC: 9.2 MG/DL (ref 8.5–10.5)
CASTS: ABNORMAL /LPF
CASTS: ABNORMAL /LPF
CHARACTER, URINE: CLEAR
CHLORIDE BLD-SCNC: 89 MEQ/L (ref 98–111)
CHLORIDE BLD-SCNC: 91 MEQ/L (ref 98–111)
CO2: 22 MEQ/L (ref 23–33)
CO2: 26 MEQ/L (ref 23–33)
COLLECTED BY:: ABNORMAL
COLOR: YELLOW
CREAT SERPL-MCNC: 0.6 MG/DL (ref 0.4–1.2)
CREAT SERPL-MCNC: 0.7 MG/DL (ref 0.4–1.2)
CRYSTALS: ABNORMAL
DEVICE: ABNORMAL
EOSINOPHIL # BLD: 2.7 %
EOSINOPHILS ABSOLUTE: 0.2 THOU/MM3 (ref 0–0.4)
EPITHELIAL CELLS, UA: ABNORMAL /HPF
ERYTHROCYTE [DISTWIDTH] IN BLOOD BY AUTOMATED COUNT: 12.3 % (ref 11.5–14.5)
ERYTHROCYTE [DISTWIDTH] IN BLOOD BY AUTOMATED COUNT: 12.8 % (ref 11.5–14.5)
ERYTHROCYTE [DISTWIDTH] IN BLOOD BY AUTOMATED COUNT: 37.2 FL (ref 35–45)
ERYTHROCYTE [DISTWIDTH] IN BLOOD BY AUTOMATED COUNT: 40.5 FL (ref 35–45)
GFR SERPL CREATININE-BSD FRML MDRD: > 90 ML/MIN/1.73M2
GFR SERPL CREATININE-BSD FRML MDRD: > 90 ML/MIN/1.73M2
GLUCOSE BLD-MCNC: 268 MG/DL (ref 70–108)
GLUCOSE BLD-MCNC: 376 MG/DL (ref 70–108)
GLUCOSE BLD-MCNC: 583 MG/DL (ref 70–108)
GLUCOSE BLD-MCNC: 590 MG/DL (ref 70–108)
GLUCOSE BLD-MCNC: 624 MG/DL (ref 70–108)
GLUCOSE, URINE: >= 1000 MG/DL
HBA1C MFR BLD: 10.2 % (ref 4.4–6.4)
HBA1C MFR BLD: 10.5 % (ref 4.3–5.7)
HCO3: 25 MMOL/L (ref 23–28)
HCT VFR BLD CALC: 40.8 % (ref 42–52)
HCT VFR BLD CALC: 45.8 % (ref 42–52)
HEMOGLOBIN: 15 GM/DL (ref 14–18)
HEMOGLOBIN: 16.1 GM/DL (ref 14–18)
IMMATURE GRANS (ABS): 0.02 THOU/MM3 (ref 0–0.07)
IMMATURE GRANULOCYTES: 0 %
KETONES, URINE: NEGATIVE
LACTIC ACID, SEPSIS: 3.2 MMOL/L (ref 0.5–1.9)
LACTIC ACID, SEPSIS: 4.4 MMOL/L (ref 0.5–1.9)
LEUKOCYTE ESTERASE, URINE: NEGATIVE
LIPASE: 16.6 U/L (ref 5.6–51.3)
LIPASE: 19.3 U/L (ref 5.6–51.3)
LYMPHOCYTES # BLD: 30.4 %
LYMPHOCYTES ABSOLUTE: 1.7 THOU/MM3 (ref 1–4.8)
MCH RBC QN AUTO: 30.8 PG (ref 26–33)
MCH RBC QN AUTO: 31.3 PG (ref 26–33)
MCHC RBC AUTO-ENTMCNC: 35.2 GM/DL (ref 32.2–35.5)
MCHC RBC AUTO-ENTMCNC: 36.8 GM/DL (ref 32.2–35.5)
MCV RBC AUTO: 85 FL (ref 80–94)
MCV RBC AUTO: 87.7 FL (ref 80–94)
MISCELLANEOUS LAB TEST RESULT: ABNORMAL
MONOCYTES # BLD: 10.3 %
MONOCYTES ABSOLUTE: 0.6 THOU/MM3 (ref 0.4–1.3)
NITRITE, URINE: NEGATIVE
NUCLEATED RED BLOOD CELLS: 0 /100 WBC
O2 SATURATION: 93 %
OSMOLALITY CALCULATION: 292.8 MOSMOL/KG (ref 275–300)
PCO2: 38 MMHG (ref 35–45)
PH BLOOD GAS: 7.43 (ref 7.35–7.45)
PH UA: 6 (ref 5–9)
PLATELET # BLD: 206 THOU/MM3 (ref 130–400)
PLATELET # BLD: 241 THOU/MM3 (ref 130–400)
PMV BLD AUTO: 10.1 FL (ref 9.4–12.4)
PMV BLD AUTO: 10.9 FL (ref 9.4–12.4)
PO2: 64 MMHG (ref 71–104)
POTASSIUM SERPL-SCNC: 3.1 MEQ/L (ref 3.5–5.2)
POTASSIUM SERPL-SCNC: 3.9 MEQ/L (ref 3.5–5.2)
PROCALCITONIN: 0.11 NG/ML (ref 0.01–0.09)
PROTEIN UA: NEGATIVE MG/DL
RBC # BLD: 4.8 MILL/MM3 (ref 4.7–6.1)
RBC # BLD: 5.22 MILL/MM3 (ref 4.7–6.1)
RBC URINE: ABNORMAL /HPF
RENAL EPITHELIAL, UA: ABNORMAL
SEG NEUTROPHILS: 55.8 %
SEGMENTED NEUTROPHILS ABSOLUTE COUNT: 3.2 THOU/MM3 (ref 1.8–7.7)
SODIUM BLD-SCNC: 132 MEQ/L (ref 135–145)
SODIUM BLD-SCNC: 134 MEQ/L (ref 135–145)
SOURCE, BLOOD GAS: ABNORMAL
SPECIFIC GRAVITY UA: > 1.03 (ref 1–1.03)
TOTAL PROTEIN: 7.1 G/DL (ref 6.1–8)
TOTAL PROTEIN: 7.3 G/DL (ref 6.1–8)
TROPONIN T: < 0.01 NG/ML
UROBILINOGEN, URINE: 0.2 EU/DL (ref 0–1)
WBC # BLD: 5.7 THOU/MM3 (ref 4.8–10.8)
WBC # BLD: 6.9 THOU/MM3 (ref 4.8–10.8)
WBC UA: ABNORMAL /HPF
YEAST: ABNORMAL

## 2019-08-12 PROCEDURE — 96365 THER/PROPH/DIAG IV INF INIT: CPT

## 2019-08-12 PROCEDURE — 82803 BLOOD GASES ANY COMBINATION: CPT

## 2019-08-12 PROCEDURE — 99214 OFFICE O/P EST MOD 30 MIN: CPT | Performed by: INTERNAL MEDICINE

## 2019-08-12 PROCEDURE — 81001 URINALYSIS AUTO W/SCOPE: CPT

## 2019-08-12 PROCEDURE — 99285 EMERGENCY DEPT VISIT HI MDM: CPT

## 2019-08-12 PROCEDURE — 93005 ELECTROCARDIOGRAM TRACING: CPT | Performed by: FAMILY MEDICINE

## 2019-08-12 PROCEDURE — 87040 BLOOD CULTURE FOR BACTERIA: CPT

## 2019-08-12 PROCEDURE — 87081 CULTURE SCREEN ONLY: CPT

## 2019-08-12 PROCEDURE — G8599 NO ASA/ANTIPLAT THER USE RNG: HCPCS | Performed by: INTERNAL MEDICINE

## 2019-08-12 PROCEDURE — 36600 WITHDRAWAL OF ARTERIAL BLOOD: CPT

## 2019-08-12 PROCEDURE — 2022F DILAT RTA XM EVC RTNOPTHY: CPT | Performed by: INTERNAL MEDICINE

## 2019-08-12 PROCEDURE — 2060000000 HC ICU INTERMEDIATE R&B

## 2019-08-12 PROCEDURE — G8427 DOCREV CUR MEDS BY ELIG CLIN: HCPCS | Performed by: INTERNAL MEDICINE

## 2019-08-12 PROCEDURE — 80053 COMPREHEN METABOLIC PANEL: CPT

## 2019-08-12 PROCEDURE — 83036 HEMOGLOBIN GLYCOSYLATED A1C: CPT | Performed by: INTERNAL MEDICINE

## 2019-08-12 PROCEDURE — 87500 VANOMYCIN DNA AMP PROBE: CPT

## 2019-08-12 PROCEDURE — 6370000000 HC RX 637 (ALT 250 FOR IP): Performed by: FAMILY MEDICINE

## 2019-08-12 PROCEDURE — 87641 MR-STAPH DNA AMP PROBE: CPT

## 2019-08-12 PROCEDURE — 74176 CT ABD & PELVIS W/O CONTRAST: CPT

## 2019-08-12 PROCEDURE — 1036F TOBACCO NON-USER: CPT | Performed by: INTERNAL MEDICINE

## 2019-08-12 PROCEDURE — 85025 COMPLETE CBC W/AUTO DIFF WBC: CPT

## 2019-08-12 PROCEDURE — 6360000002 HC RX W HCPCS: Performed by: FAMILY MEDICINE

## 2019-08-12 PROCEDURE — 83036 HEMOGLOBIN GLYCOSYLATED A1C: CPT

## 2019-08-12 PROCEDURE — 36415 COLL VENOUS BLD VENIPUNCTURE: CPT

## 2019-08-12 PROCEDURE — 84145 PROCALCITONIN (PCT): CPT

## 2019-08-12 PROCEDURE — 82010 KETONE BODYS QUAN: CPT

## 2019-08-12 PROCEDURE — 3046F HEMOGLOBIN A1C LEVEL >9.0%: CPT | Performed by: INTERNAL MEDICINE

## 2019-08-12 PROCEDURE — 84484 ASSAY OF TROPONIN QUANT: CPT

## 2019-08-12 PROCEDURE — 2580000003 HC RX 258: Performed by: FAMILY MEDICINE

## 2019-08-12 PROCEDURE — 87086 URINE CULTURE/COLONY COUNT: CPT

## 2019-08-12 PROCEDURE — G8417 CALC BMI ABV UP PARAM F/U: HCPCS | Performed by: INTERNAL MEDICINE

## 2019-08-12 PROCEDURE — 71045 X-RAY EXAM CHEST 1 VIEW: CPT

## 2019-08-12 PROCEDURE — 83690 ASSAY OF LIPASE: CPT

## 2019-08-12 PROCEDURE — 99223 1ST HOSP IP/OBS HIGH 75: CPT | Performed by: NURSE PRACTITIONER

## 2019-08-12 PROCEDURE — 82248 BILIRUBIN DIRECT: CPT

## 2019-08-12 PROCEDURE — 83605 ASSAY OF LACTIC ACID: CPT

## 2019-08-12 PROCEDURE — 82948 REAGENT STRIP/BLOOD GLUCOSE: CPT

## 2019-08-12 PROCEDURE — 2709999900 HC NON-CHARGEABLE SUPPLY

## 2019-08-12 RX ORDER — DEXTROSE MONOHYDRATE 25 G/50ML
12.5 INJECTION, SOLUTION INTRAVENOUS PRN
Status: DISCONTINUED | OUTPATIENT
Start: 2019-08-12 | End: 2019-08-13 | Stop reason: HOSPADM

## 2019-08-12 RX ORDER — ROPINIROLE 1 MG/1
TABLET, FILM COATED ORAL
Qty: 270 TABLET | Refills: 1 | Status: SHIPPED | OUTPATIENT
Start: 2019-08-12 | End: 2019-12-17 | Stop reason: SDUPTHER

## 2019-08-12 RX ORDER — FENOFIBRATE 145 MG/1
145 TABLET, COATED ORAL DAILY
Qty: 90 TABLET | Refills: 1 | Status: SHIPPED | OUTPATIENT
Start: 2019-08-12 | End: 2019-12-17 | Stop reason: SDUPTHER

## 2019-08-12 RX ORDER — SODIUM CHLORIDE 9 MG/ML
INJECTION, SOLUTION INTRAVENOUS CONTINUOUS
Status: DISCONTINUED | OUTPATIENT
Start: 2019-08-12 | End: 2019-08-13 | Stop reason: DRUGHIGH

## 2019-08-12 RX ORDER — ERGOCALCIFEROL 1.25 MG/1
50000 CAPSULE ORAL WEEKLY
Qty: 12 CAPSULE | Refills: 1 | Status: SHIPPED | OUTPATIENT
Start: 2019-08-12 | End: 2019-12-17 | Stop reason: SDUPTHER

## 2019-08-12 RX ORDER — ATORVASTATIN CALCIUM 10 MG/1
TABLET, FILM COATED ORAL
Qty: 30 TABLET | Refills: 5 | Status: SHIPPED | OUTPATIENT
Start: 2019-08-12 | End: 2019-12-17 | Stop reason: SDUPTHER

## 2019-08-12 RX ORDER — POTASSIUM CHLORIDE 7.45 MG/ML
10 INJECTION INTRAVENOUS ONCE
Status: COMPLETED | OUTPATIENT
Start: 2019-08-12 | End: 2019-08-12

## 2019-08-12 RX ORDER — PANTOPRAZOLE SODIUM 40 MG/1
40 TABLET, DELAYED RELEASE ORAL 2 TIMES DAILY
Qty: 180 TABLET | Refills: 1 | Status: SHIPPED | OUTPATIENT
Start: 2019-08-12 | End: 2021-03-08 | Stop reason: SDUPTHER

## 2019-08-12 RX ORDER — 0.9 % SODIUM CHLORIDE 0.9 %
1000 INTRAVENOUS SOLUTION INTRAVENOUS ONCE
Status: COMPLETED | OUTPATIENT
Start: 2019-08-12 | End: 2019-08-13

## 2019-08-12 RX ADMIN — POTASSIUM CHLORIDE 10 MEQ: 7.46 INJECTION, SOLUTION INTRAVENOUS at 22:43

## 2019-08-12 RX ADMIN — Medication 10.6 UNITS/HR: at 21:33

## 2019-08-12 RX ADMIN — SODIUM CHLORIDE 1000 ML: 9 INJECTION, SOLUTION INTRAVENOUS at 21:34

## 2019-08-12 ASSESSMENT — PAIN DESCRIPTION - LOCATION
LOCATION: ABDOMEN

## 2019-08-12 ASSESSMENT — PAIN DESCRIPTION - ORIENTATION
ORIENTATION: LOWER

## 2019-08-12 ASSESSMENT — ENCOUNTER SYMPTOMS
WHEEZING: 0
DIARRHEA: 1
COUGH: 0
SHORTNESS OF BREATH: 0
BACK PAIN: 1
ABDOMINAL PAIN: 1

## 2019-08-12 ASSESSMENT — PAIN DESCRIPTION - ONSET
ONSET: ON-GOING

## 2019-08-12 ASSESSMENT — PAIN DESCRIPTION - FREQUENCY
FREQUENCY: CONTINUOUS
FREQUENCY: INTERMITTENT
FREQUENCY: INTERMITTENT

## 2019-08-12 ASSESSMENT — PAIN SCALES - GENERAL
PAINLEVEL_OUTOF10: 5

## 2019-08-12 ASSESSMENT — PAIN DESCRIPTION - DESCRIPTORS
DESCRIPTORS: ACHING
DESCRIPTORS: ACHING;DULL
DESCRIPTORS: ACHING;CRAMPING

## 2019-08-12 ASSESSMENT — PAIN DESCRIPTION - PAIN TYPE
TYPE: ACUTE PAIN

## 2019-08-12 ASSESSMENT — PAIN - FUNCTIONAL ASSESSMENT: PAIN_FUNCTIONAL_ASSESSMENT: ACTIVITIES ARE NOT PREVENTED

## 2019-08-12 ASSESSMENT — PAIN DESCRIPTION - PROGRESSION
CLINICAL_PROGRESSION: NOT CHANGED
CLINICAL_PROGRESSION: GRADUALLY WORSENING

## 2019-08-12 NOTE — PROGRESS NOTES
He has multiple factors that make it difficult to treat his diabetes. He has chronic abdominal pain for which he see [facility] and [employer]. He eats very little during the day due to abdominal pain and eats large supper and snacking at night. 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 FSBS see media - high and lows. Very elevated 300-500's  last 2 days but lows prior. Taking Levemir 55/70 units BID, Novolog taking 30 Units base when eating, SSI 3 Units every 50. To investigate why he is so high the last couple days - not making good food choices. At chicken nuggets and fries last night and grilled cheese with tomato soup at lunch. Suggested more protein/veggies and less carbs. He refuses to see DM clinic or dietician. He is overly stressed with grandchildren and not taking time to think about better choices for himself. I don't know how to make this better other than educate him. Suggested increasing Novolog if eating high carb foods. Did not change Levemir as he is normal in am prior to last 2 days. He denies any signs of infection except chronic poor dentition - no abscess or increased pain. CT neck noted enlarged submental LN - I treated with antibiotic and repeat U/S of neck and under chin when checked thyroid 12/2018 - it noted LN and supported reactive process. No enlarged LN on exam today to bx. Offered ultrasound of axilla as had tender spots but he declined for now. Bilateral feet pain (DM neuropathy) severe - no longer having tingling/numbness - now just pain - Dr. Renee Payne has no more options for him per patient. Fatigue - likely due to uncontrolled DM, poor sleep. Will check vitamin D and B12/folate.   Hg normal

## 2019-08-12 NOTE — TELEPHONE ENCOUNTER
Received critical value from lab, glucose 624 on labs. Anion gap 17, Na 132, bilirubin 3.0. Call to pt, he has not checked his glucose since leaving the office today. Informed of above. Reports abdominal pain, nausea/vomiting, not eating. Advised pt to present to ED for further evaluation and fluids to help bring glucose down, he is agreeable to evaluation in the ED. Report called to [facility] charge nurse at [facility] ED that pt to be brought in by wife for evaluation.

## 2019-08-13 VITALS
SYSTOLIC BLOOD PRESSURE: 106 MMHG | HEART RATE: 70 BPM | WEIGHT: 228.12 LBS | TEMPERATURE: 97.6 F | HEIGHT: 74 IN | BODY MASS INDEX: 29.28 KG/M2 | OXYGEN SATURATION: 95 % | DIASTOLIC BLOOD PRESSURE: 67 MMHG | RESPIRATION RATE: 16 BRPM

## 2019-08-13 LAB
ANION GAP SERPL CALCULATED.3IONS-SCNC: 12 MEQ/L (ref 8–16)
BASOPHILS # BLD: 0.4 %
BASOPHILS ABSOLUTE: 0 THOU/MM3 (ref 0–0.1)
BUN BLDV-MCNC: 7 MG/DL (ref 7–22)
CALCIUM SERPL-MCNC: 9.5 MG/DL (ref 8.5–10.5)
CHLORIDE BLD-SCNC: 101 MEQ/L (ref 98–111)
CO2: 29 MEQ/L (ref 23–33)
CREAT SERPL-MCNC: 0.6 MG/DL (ref 0.4–1.2)
EKG ATRIAL RATE: 50 BPM
EKG Q-T INTERVAL: 536 MS
EKG QRS DURATION: 88 MS
EKG QTC CALCULATION (BAZETT): 695 MS
EKG R AXIS: 0 DEGREES
EKG T AXIS: 22 DEGREES
EKG VENTRICULAR RATE: 101 BPM
EOSINOPHIL # BLD: 3.6 %
EOSINOPHILS ABSOLUTE: 0.3 THOU/MM3 (ref 0–0.4)
ERYTHROCYTE [DISTWIDTH] IN BLOOD BY AUTOMATED COUNT: 12.5 % (ref 11.5–14.5)
ERYTHROCYTE [DISTWIDTH] IN BLOOD BY AUTOMATED COUNT: 37.4 FL (ref 35–45)
GFR SERPL CREATININE-BSD FRML MDRD: > 90 ML/MIN/1.73M2
GLUCOSE BLD-MCNC: 113 MG/DL (ref 70–108)
GLUCOSE BLD-MCNC: 134 MG/DL (ref 70–108)
GLUCOSE BLD-MCNC: 139 MG/DL (ref 70–108)
GLUCOSE BLD-MCNC: 140 MG/DL (ref 70–108)
GLUCOSE BLD-MCNC: 141 MG/DL (ref 70–108)
GLUCOSE BLD-MCNC: 147 MG/DL (ref 70–108)
GLUCOSE BLD-MCNC: 156 MG/DL (ref 70–108)
GLUCOSE BLD-MCNC: 160 MG/DL (ref 70–108)
GLUCOSE BLD-MCNC: 195 MG/DL (ref 70–108)
GLUCOSE BLD-MCNC: 240 MG/DL (ref 70–108)
GLUCOSE BLD-MCNC: 252 MG/DL (ref 70–108)
GLUCOSE BLD-MCNC: 289 MG/DL (ref 70–108)
HCT VFR BLD CALC: 43 % (ref 42–52)
HEMOGLOBIN: 15.7 GM/DL (ref 14–18)
IMMATURE GRANS (ABS): 0.03 THOU/MM3 (ref 0–0.07)
IMMATURE GRANULOCYTES: 0 %
LACTIC ACID: 1.9 MMOL/L (ref 0.5–2.2)
LYMPHOCYTES # BLD: 36.7 %
LYMPHOCYTES ABSOLUTE: 3.3 THOU/MM3 (ref 1–4.8)
MAGNESIUM: 1.7 MG/DL (ref 1.6–2.4)
MCH RBC QN AUTO: 31 PG (ref 26–33)
MCHC RBC AUTO-ENTMCNC: 36.5 GM/DL (ref 32.2–35.5)
MCV RBC AUTO: 84.8 FL (ref 80–94)
MONOCYTES # BLD: 9.2 %
MONOCYTES ABSOLUTE: 0.8 THOU/MM3 (ref 0.4–1.3)
MRSA SCREEN RT-PCR: NEGATIVE
NUCLEATED RED BLOOD CELLS: 0 /100 WBC
PLATELET # BLD: 265 THOU/MM3 (ref 130–400)
PMV BLD AUTO: 9.9 FL (ref 9.4–12.4)
POTASSIUM SERPL-SCNC: 2.9 MEQ/L (ref 3.5–5.2)
RBC # BLD: 5.07 MILL/MM3 (ref 4.7–6.1)
SEG NEUTROPHILS: 49.8 %
SEGMENTED NEUTROPHILS ABSOLUTE COUNT: 4.5 THOU/MM3 (ref 1.8–7.7)
SODIUM BLD-SCNC: 142 MEQ/L (ref 135–145)
VANCOMYCIN RESISTANT ENTEROCOCCUS: NEGATIVE
WBC # BLD: 9.1 THOU/MM3 (ref 4.8–10.8)

## 2019-08-13 PROCEDURE — 85025 COMPLETE CBC W/AUTO DIFF WBC: CPT

## 2019-08-13 PROCEDURE — 82948 REAGENT STRIP/BLOOD GLUCOSE: CPT

## 2019-08-13 PROCEDURE — 6370000000 HC RX 637 (ALT 250 FOR IP): Performed by: NURSE PRACTITIONER

## 2019-08-13 PROCEDURE — 99239 HOSP IP/OBS DSCHRG MGMT >30: CPT | Performed by: HOSPITALIST

## 2019-08-13 PROCEDURE — 51702 INSERT TEMP BLADDER CATH: CPT

## 2019-08-13 PROCEDURE — 36415 COLL VENOUS BLD VENIPUNCTURE: CPT

## 2019-08-13 PROCEDURE — 83735 ASSAY OF MAGNESIUM: CPT

## 2019-08-13 PROCEDURE — 2580000003 HC RX 258: Performed by: NURSE PRACTITIONER

## 2019-08-13 PROCEDURE — 6370000000 HC RX 637 (ALT 250 FOR IP): Performed by: HOSPITALIST

## 2019-08-13 PROCEDURE — 83605 ASSAY OF LACTIC ACID: CPT

## 2019-08-13 PROCEDURE — 93010 ELECTROCARDIOGRAM REPORT: CPT | Performed by: INTERNAL MEDICINE

## 2019-08-13 PROCEDURE — 2580000003 HC RX 258: Performed by: HOSPITALIST

## 2019-08-13 PROCEDURE — 2709999900 HC NON-CHARGEABLE SUPPLY

## 2019-08-13 PROCEDURE — 6360000002 HC RX W HCPCS: Performed by: NURSE PRACTITIONER

## 2019-08-13 PROCEDURE — 80048 BASIC METABOLIC PNL TOTAL CA: CPT

## 2019-08-13 RX ORDER — LEVETIRACETAM 500 MG/1
2000 TABLET ORAL 2 TIMES DAILY
Status: DISCONTINUED | OUTPATIENT
Start: 2019-08-13 | End: 2019-08-13 | Stop reason: HOSPADM

## 2019-08-13 RX ORDER — GABAPENTIN 600 MG/1
600 TABLET ORAL 4 TIMES DAILY
Status: DISCONTINUED | OUTPATIENT
Start: 2019-08-13 | End: 2019-08-13 | Stop reason: HOSPADM

## 2019-08-13 RX ORDER — CYCLOBENZAPRINE HCL 10 MG
10 TABLET ORAL 3 TIMES DAILY PRN
COMMUNITY
End: 2019-09-30

## 2019-08-13 RX ORDER — DULOXETIN HYDROCHLORIDE 30 MG/1
30 CAPSULE, DELAYED RELEASE ORAL DAILY
Status: DISCONTINUED | OUTPATIENT
Start: 2019-08-13 | End: 2019-08-13 | Stop reason: SDUPTHER

## 2019-08-13 RX ORDER — ONDANSETRON 2 MG/ML
4 INJECTION INTRAMUSCULAR; INTRAVENOUS EVERY 6 HOURS PRN
Status: DISCONTINUED | OUTPATIENT
Start: 2019-08-13 | End: 2019-08-13 | Stop reason: SDUPTHER

## 2019-08-13 RX ORDER — ATORVASTATIN CALCIUM 10 MG/1
10 TABLET, FILM COATED ORAL NIGHTLY
Status: DISCONTINUED | OUTPATIENT
Start: 2019-08-13 | End: 2019-08-13 | Stop reason: HOSPADM

## 2019-08-13 RX ORDER — POTASSIUM CHLORIDE 7.45 MG/ML
10 INJECTION INTRAVENOUS
Status: COMPLETED | OUTPATIENT
Start: 2019-08-13 | End: 2019-08-13

## 2019-08-13 RX ORDER — SODIUM CHLORIDE 0.9 % (FLUSH) 0.9 %
10 SYRINGE (ML) INJECTION PRN
Status: DISCONTINUED | OUTPATIENT
Start: 2019-08-13 | End: 2019-08-13 | Stop reason: HOSPADM

## 2019-08-13 RX ORDER — SODIUM CHLORIDE 9 MG/ML
INJECTION, SOLUTION INTRAVENOUS CONTINUOUS
Status: DISCONTINUED | OUTPATIENT
Start: 2019-08-13 | End: 2019-08-13 | Stop reason: HOSPADM

## 2019-08-13 RX ORDER — ZONISAMIDE 100 MG/1
500 CAPSULE ORAL NIGHTLY
Status: DISCONTINUED | OUTPATIENT
Start: 2019-08-13 | End: 2019-08-13 | Stop reason: HOSPADM

## 2019-08-13 RX ORDER — LANOLIN ALCOHOL/MO/W.PET/CERES
30 CREAM (GRAM) TOPICAL NIGHTLY
Status: DISCONTINUED | OUTPATIENT
Start: 2019-08-13 | End: 2019-08-13 | Stop reason: HOSPADM

## 2019-08-13 RX ORDER — 0.9 % SODIUM CHLORIDE 0.9 %
1000 INTRAVENOUS SOLUTION INTRAVENOUS ONCE
Status: COMPLETED | OUTPATIENT
Start: 2019-08-13 | End: 2019-08-13

## 2019-08-13 RX ORDER — DEXTROSE MONOHYDRATE 25 G/50ML
12.5 INJECTION, SOLUTION INTRAVENOUS PRN
Status: DISCONTINUED | OUTPATIENT
Start: 2019-08-13 | End: 2019-08-13 | Stop reason: HOSPADM

## 2019-08-13 RX ORDER — PANTOPRAZOLE SODIUM 40 MG/1
40 TABLET, DELAYED RELEASE ORAL
Status: DISCONTINUED | OUTPATIENT
Start: 2019-08-13 | End: 2019-08-13 | Stop reason: HOSPADM

## 2019-08-13 RX ORDER — DEXTROSE MONOHYDRATE 50 MG/ML
100 INJECTION, SOLUTION INTRAVENOUS PRN
Status: DISCONTINUED | OUTPATIENT
Start: 2019-08-13 | End: 2019-08-13 | Stop reason: HOSPADM

## 2019-08-13 RX ORDER — ROPINIROLE 1 MG/1
1 TABLET, FILM COATED ORAL 3 TIMES DAILY
Status: DISCONTINUED | OUTPATIENT
Start: 2019-08-13 | End: 2019-08-13 | Stop reason: HOSPADM

## 2019-08-13 RX ORDER — DULOXETIN HYDROCHLORIDE 60 MG/1
60 CAPSULE, DELAYED RELEASE ORAL DAILY
Status: DISCONTINUED | OUTPATIENT
Start: 2019-08-13 | End: 2019-08-13 | Stop reason: SDUPTHER

## 2019-08-13 RX ORDER — POTASSIUM CHLORIDE 20 MEQ/1
40 TABLET, EXTENDED RELEASE ORAL ONCE
Status: DISCONTINUED | OUTPATIENT
Start: 2019-08-13 | End: 2019-08-13 | Stop reason: DRUGHIGH

## 2019-08-13 RX ORDER — INSULIN GLARGINE 100 [IU]/ML
75 INJECTION, SOLUTION SUBCUTANEOUS DAILY
Status: DISCONTINUED | OUTPATIENT
Start: 2019-08-13 | End: 2019-08-13 | Stop reason: HOSPADM

## 2019-08-13 RX ORDER — NICOTINE POLACRILEX 4 MG
15 LOZENGE BUCCAL PRN
Status: DISCONTINUED | OUTPATIENT
Start: 2019-08-13 | End: 2019-08-13 | Stop reason: HOSPADM

## 2019-08-13 RX ORDER — SODIUM CHLORIDE 0.9 % (FLUSH) 0.9 %
10 SYRINGE (ML) INJECTION EVERY 12 HOURS SCHEDULED
Status: DISCONTINUED | OUTPATIENT
Start: 2019-08-13 | End: 2019-08-13 | Stop reason: HOSPADM

## 2019-08-13 RX ORDER — ONDANSETRON 2 MG/ML
4 INJECTION INTRAMUSCULAR; INTRAVENOUS EVERY 6 HOURS PRN
Status: DISCONTINUED | OUTPATIENT
Start: 2019-08-13 | End: 2019-08-13

## 2019-08-13 RX ORDER — ACETAMINOPHEN 325 MG/1
650 TABLET ORAL EVERY 4 HOURS PRN
Status: DISCONTINUED | OUTPATIENT
Start: 2019-08-13 | End: 2019-08-13 | Stop reason: HOSPADM

## 2019-08-13 RX ORDER — LORAZEPAM 0.5 MG/1
0.5 TABLET ORAL 2 TIMES DAILY PRN
Status: DISCONTINUED | OUTPATIENT
Start: 2019-08-13 | End: 2019-08-13 | Stop reason: HOSPADM

## 2019-08-13 RX ORDER — DIVALPROEX SODIUM 500 MG/1
500 TABLET, DELAYED RELEASE ORAL 2 TIMES DAILY
Status: DISCONTINUED | OUTPATIENT
Start: 2019-08-13 | End: 2019-08-13 | Stop reason: HOSPADM

## 2019-08-13 RX ADMIN — INSULIN GLARGINE 75 UNITS: 100 INJECTION, SOLUTION SUBCUTANEOUS at 09:50

## 2019-08-13 RX ADMIN — GABAPENTIN 600 MG: 600 TABLET, FILM COATED ORAL at 16:09

## 2019-08-13 RX ADMIN — POTASSIUM CHLORIDE 10 MEQ: 7.46 INJECTION, SOLUTION INTRAVENOUS at 12:42

## 2019-08-13 RX ADMIN — Medication 30 MG: at 01:56

## 2019-08-13 RX ADMIN — GABAPENTIN 600 MG: 600 TABLET, FILM COATED ORAL at 01:55

## 2019-08-13 RX ADMIN — POTASSIUM CHLORIDE 10 MEQ: 7.46 INJECTION, SOLUTION INTRAVENOUS at 10:27

## 2019-08-13 RX ADMIN — METOPROLOL TARTRATE 75 MG: 25 TABLET ORAL at 01:55

## 2019-08-13 RX ADMIN — GABAPENTIN 600 MG: 600 TABLET, FILM COATED ORAL at 08:48

## 2019-08-13 RX ADMIN — INSULIN LISPRO 30 UNITS: 100 INJECTION, SOLUTION INTRAVENOUS; SUBCUTANEOUS at 18:55

## 2019-08-13 RX ADMIN — DIVALPROEX SODIUM 500 MG: 500 TABLET, DELAYED RELEASE ORAL at 01:55

## 2019-08-13 RX ADMIN — SODIUM CHLORIDE: 9 INJECTION, SOLUTION INTRAVENOUS at 03:16

## 2019-08-13 RX ADMIN — SODIUM CHLORIDE: 9 INJECTION, SOLUTION INTRAVENOUS at 01:04

## 2019-08-13 RX ADMIN — POTASSIUM CHLORIDE 10 MEQ: 7.46 INJECTION, SOLUTION INTRAVENOUS at 05:58

## 2019-08-13 RX ADMIN — PANTOPRAZOLE SODIUM 40 MG: 40 TABLET, DELAYED RELEASE ORAL at 16:08

## 2019-08-13 RX ADMIN — SODIUM CHLORIDE: 9 INJECTION, SOLUTION INTRAVENOUS at 12:01

## 2019-08-13 RX ADMIN — LEVETIRACETAM 2000 MG: 500 TABLET, FILM COATED ORAL at 01:56

## 2019-08-13 RX ADMIN — DULOXETINE HYDROCHLORIDE 90 MG: 60 CAPSULE, DELAYED RELEASE ORAL at 08:49

## 2019-08-13 RX ADMIN — POTASSIUM CHLORIDE 10 MEQ: 7.46 INJECTION, SOLUTION INTRAVENOUS at 16:10

## 2019-08-13 RX ADMIN — POTASSIUM CHLORIDE 10 MEQ: 7.46 INJECTION, SOLUTION INTRAVENOUS at 14:28

## 2019-08-13 RX ADMIN — LEVETIRACETAM 2000 MG: 500 TABLET, FILM COATED ORAL at 08:48

## 2019-08-13 RX ADMIN — GABAPENTIN 600 MG: 600 TABLET, FILM COATED ORAL at 12:05

## 2019-08-13 RX ADMIN — ZONISAMIDE 500 MG: 100 CAPSULE ORAL at 01:56

## 2019-08-13 RX ADMIN — ROPINIROLE HYDROCHLORIDE 1 MG: 1 TABLET, FILM COATED ORAL at 08:49

## 2019-08-13 RX ADMIN — LACOSAMIDE 300 MG: 200 TABLET, FILM COATED ORAL at 08:52

## 2019-08-13 RX ADMIN — LORAZEPAM 0.5 MG: 0.5 TABLET ORAL at 16:05

## 2019-08-13 RX ADMIN — LACOSAMIDE 300 MG: 200 TABLET, FILM COATED ORAL at 02:05

## 2019-08-13 RX ADMIN — PANTOPRAZOLE SODIUM 40 MG: 40 TABLET, DELAYED RELEASE ORAL at 06:00

## 2019-08-13 RX ADMIN — POTASSIUM BICARBONATE 40 MEQ: 782 TABLET, EFFERVESCENT ORAL at 10:01

## 2019-08-13 RX ADMIN — POTASSIUM CHLORIDE 10 MEQ: 7.46 INJECTION, SOLUTION INTRAVENOUS at 07:40

## 2019-08-13 RX ADMIN — ROPINIROLE HYDROCHLORIDE 1 MG: 1 TABLET, FILM COATED ORAL at 14:14

## 2019-08-13 RX ADMIN — SODIUM CHLORIDE 1000 ML: 9 INJECTION, SOLUTION INTRAVENOUS at 14:55

## 2019-08-13 RX ADMIN — DIVALPROEX SODIUM 500 MG: 500 TABLET, DELAYED RELEASE ORAL at 08:49

## 2019-08-13 ASSESSMENT — PAIN DESCRIPTION - PROGRESSION
CLINICAL_PROGRESSION: NOT CHANGED

## 2019-08-13 ASSESSMENT — ENCOUNTER SYMPTOMS
VOMITING: 0
NAUSEA: 1
ABDOMINAL PAIN: 1
SORE THROAT: 0
COLOR CHANGE: 0
CONSTIPATION: 0
PHOTOPHOBIA: 0
BACK PAIN: 0
WHEEZING: 0
TROUBLE SWALLOWING: 0
COUGH: 0
APNEA: 0
ABDOMINAL DISTENTION: 0
DIARRHEA: 1

## 2019-08-13 ASSESSMENT — PAIN SCALES - GENERAL
PAINLEVEL_OUTOF10: 0

## 2019-08-13 NOTE — ED PROVIDER NOTES
[facility]  eMERGENCY dEPARTMENT eNCOUnter          CHIEF COMPLAINT       Chief Complaint   Patient presents with    Hyperglycemia    Abdominal Pain       Nurses Notes reviewed and I agree except as noted in the HPI. HISTORY OF PRESENT ILLNESS    Karolina Abreu is a 52 y.o. male who presents to the Emergency Department from his PCP office with hyperglycemia. The patient is a type 2 diabetic on Novolog, Levemir, and Invokana. Patient has a routine check up with his PCP every 6 weeks. He was noted to have a blood sugar of 624 at his PCP office today. Patient reports increased thirst and urine output. He also complains of diffuse abdominal pain for 1 week which comes on with eating. He reports a 21 pound weight loss since his last PCP visit 6 weeks ago. He reports diarrhea for the past week. He has a history of IBS. Patient reports bilateral flank pain as well with multiple known kidney stones. Patient had a mechanical fall in June this year with an L5 fracture. Patient has other medical history including PE, DVT, HTN, HLD, sleep apnea, and seizures. There are no other complaints or symptoms at this time. Location/Symptom: primary complaint - hyperglycemia  Timing/Onset: today  Context/Setting: history of diabetes  Duration: constant  Modifying Factors: Novolog, Invokana, and Levemir  Severity: 624 at PCP office    The HPI was provided by the patient. REVIEW OF SYSTEMS     Review of Systems   Constitutional: Negative for chills and fever. HENT: Negative for congestion. Respiratory: Negative for cough, shortness of breath and wheezing. Cardiovascular: Negative for chest pain. Gastrointestinal: Positive for abdominal pain and diarrhea. Postprandial abdominal pain often lasts for hours somewhat colicky in quality    irritable bowel syndrome, often has diarrhea alternating with more solid stool    Increased diarrhea for about a week   Genitourinary: Negative for dysuria. Previous Medications    ATORVASTATIN (LIPITOR) 10 MG TABLET    TAKE 1 TABLET BY MOUTH ONE TIME A DAY    BLOOD GLUCOSE MONITORING SUPPL (ONE TOUCH BASIC SYSTEM) W/DEVICE KIT    Use to test blood glucose level 4 times per day. Diagnosis: E11.40    BLOOD GLUCOSE TEST STRIPS (ONE TOUCH TEST STRIPS) STRIP    Use to test blood glucose level 3 times per day. Diagnosis: E11.40    CANAGLIFLOZIN (INVOKANA) 300 MG TABS TABLET    TAKE 1 TABLET BY MOUTH IN THE MORNING BEFORE breakfast    CLINDAMYCIN (CLEOCIN T) 1 % EXTERNAL SOLUTION    Apply topically 2 times daily. CYANOCOBALAMIN (VITAMIN B-12) 2500 MCG SUBL    Place 2,500 mcg under the tongue daily    DIVALPROEX (DEPAKOTE) 500 MG DR TABLET    Take 500 mg by mouth 2 times daily     DULOXETINE (CYMBALTA) 30 MG EXTENDED RELEASE CAPSULE    Take 30 mg by mouth daily    DULOXETINE (CYMBALTA) 60 MG EXTENDED RELEASE CAPSULE    Take 60 mg by mouth daily    FENOFIBRATE (TRICOR) 145 MG TABLET    Take 1 tablet by mouth daily    GABAPENTIN (NEURONTIN) 600 MG TABLET    Take 1 tablet by mouth 4 times daily for 180 days. INSULIN ASPART (NOVOLOG FLEXPEN) 100 UNIT/ML INJECTION PEN    Inject 30 Units into the skin 3 times daily (before meals) Plus sliding scale 4:50 > 150 mg/dl    INSULIN DETEMIR (LEVEMIR FLEXTOUCH) 100 UNIT/ML INJECTION PEN    75 units every AM and 55 units every PM    LACOSAMIDE (VIMPAT) 100 MG TABS TABLET    Take 300 mg by mouth 2 times daily.     LEVETIRACETAM (KEPPRA) 500 MG TABLET    Take 2,000 mg by mouth 2 times daily     LORAZEPAM (ATIVAN) 0.5 MG TABLET    Take 0.5 mg by mouth 2 times daily as needed (seizures)     MELATONIN 10 MG TABS    Take 30 mg by mouth nightly     METOPROLOL TARTRATE (LOPRESSOR) 50 MG TABLET    Take 1 tablet by mouth 2 times daily    PANTOPRAZOLE (PROTONIX) 40 MG TABLET    Take 1 tablet by mouth 2 times daily    ROPINIROLE (REQUIP) 1 MG TABLET    TAKE 1 TABLET BY MOUTH THREE TIMES A DAY    SILDENAFIL (VIAGRA) 100 MG TABLET    Take 1 He is alert and oriented to person, place, and time. He exhibits normal muscle tone. Skin: Skin is warm and dry. No rash noted. Psychiatric: He has a normal mood and affect. His behavior is normal.   Nursing note and vitals reviewed. DIFFERENTIAL DIAGNOSIS:     Poorly controlled diabetes    Abd. Pain; eval for sbo    DIAGNOSTIC RESULTS     EKG: All EKG's are interpretedby the Emergency Department Physician who either signs or Co-signs this chart in the absence of a cardiologist.    EKG showed sinus rhythm with rate 101. QRS complexes show normal axis, normal conduction. ST-T waves show nonspecific changes        RADIOLOGY: non-plain film images(s) such as CT, Ultrasound and MRI are read by the radiologist.    CT ABDOMEN PELVIS WO CONTRAST Additional Contrast? None   Final Result   1. Visualized bilateral, nonobstructing renal calculi. 2. No active pathology of the abdomen or pelvis noted. 3 visualized IVC filter         **This report has been created using voice recognition software. It may contain minor errors which are inherent in voice recognition technology. **      Final report electronically signed by Dr. Terese Kocher on 8/12/2019 9:54 PM      XR CHEST PORTABLE   Final Result   1. Stable coarse lung markings. Correlate with previous lung base scarring. No other active appearing pathology changes present. **This report has been created using voice recognition software. It may contain minor errors which are inherent in voice recognition technology. **      Final report electronically signed by Dr. Terese Kocher on 8/12/2019 9:38 PM          LABS:   Labs Reviewed   CBC WITH AUTO DIFFERENTIAL - Abnormal; Notable for the following components:       Result Value    Hematocrit 40.8 (*)     MCHC 36.8 (*)     All other components within normal limits   BLOOD GAS, ARTERIAL - Abnormal; Notable for the following components:    PO2 64 (*)     All other components within normal limits   BASIC

## 2019-08-13 NOTE — ED TRIAGE NOTES
Pt to ED via intake with c/o hyperglycemia and abd pain. Pt reports getting blood work done today and being called that blood sugar was high. Pt states \"This is nothing new. \" Pt reports they are compliant with medications. Pt Reports he last took insulin at 1100 today. Pt denies feeling light headed or SOB, Pt denies chest pain or SOB. Pt skin was warm and dry. Pt reports hx of Irritable  Bowel. Pt on phone playing games during RN assessment. IV was inserted. Tele monitor applied.

## 2019-08-13 NOTE — ED NOTES
Pt stating iv is burning with K+. Dose decreased to 50 mL/hr. Will monitor.      Teresa Grigsby RN  08/12/19 [id number]

## 2019-08-13 NOTE — CARE COORDINATION
8/13/19, 9:59 AM  Violet Purvis       Admitted from: ED 8/12/2019/ [address] Gandhi day: 1   Location: [facility]/014-A Reason for admit: Uncontrolled insulin dependent diabetes mellitus ([address] [E11.65, Z79.4] Status: inpt  Admit order signed?: yes  PMH:  has a past medical history of Acid reflux, Anemia, Bipolar disorder ([address] Blood clot in vein, Cancer ([address] Chest pain, Chronic back pain, Chronic bronchitis ([address] Colon polyp, Depression, DVT (deep venous thrombosis) ([address] Esophageal abnormality, Fatty liver disease, nonalcoholic, Furuncle, History of Doppler ultrasound, Hx of blood clots, Hyperlipidemia, Hypertension, Intracranial arachnoid cyst, Irritable bowel syndrome, Liver disease, MDRO (multiple drug resistant organisms) resistance, MRSA (methicillin resistant staph aureus) culture positive, Nephrolithiasis, Neuropathy, Pancreatic insufficiency, Positive SANDY (antinuclear antibody), Pulmonary embolism (HCC), Restless legs syndrome, Seizures ([address] Sinus tachycardia, Skull fracture ([address] Sleep apnea, and Type II or unspecified type diabetes mellitus without mention of complication, not stated as uncontrolled.   Medications:  Scheduled Meds:   atorvastatin  10 mg Oral Nightly    divalproex  500 mg Oral BID    gabapentin  600 mg Oral 4x Daily    lacosamide  300 mg Oral BID    levETIRAcetam  2,000 mg Oral BID    melatonin  30 mg Oral Nightly    metoprolol tartrate  75 mg Oral BID    pantoprazole  40 mg Oral BID AC    rOPINIRole  1 mg Oral TID    zonisamide  500 mg Oral Nightly    sodium chloride flush  10 mL Intravenous 2 times per day    DULoxetine  90 mg Oral Daily    magnesium replacement protocol   Other RX Placeholder    potassium replacement protocol   Other RX Placeholder    potassium chloride  10 mEq Intravenous Q2H    insulin glargine  75 Units Subcutaneous Daily    potassium bicarb-citric acid  40 mEq Oral Q2H     Continuous Infusions:   sodium chloride 125 mL/hr at 08/13/19 5704    No

## 2019-08-13 NOTE — PLAN OF CARE
Problem: Pain:  Goal: Pain level will decrease  Description  Pain level will decrease  Outcome: Ongoing  Note:   Pain Assessment: 0-10  Pain Level: 0   Pain goal:  No Pain   Is pain goal met at this time? Yes     Additional interventions to be implemented: position change         Problem: Pain:  Goal: Control of acute pain  Description  Control of acute pain  Outcome: Ongoing  Note:   Patient has not complained of any acute pain noted this shift. Problem: Pain:  Goal: Control of chronic pain  Description  Control of chronic pain  Outcome: Ongoing  Note:   No chronic pain noted this shift. Problem: Falls - Risk of:  Goal: Will remain free from falls  Description  Will remain free from falls  Outcome: Ongoing  Note:   Patient has remained free of falls this shift. Bed alarm on. Call light within reach. Patient uses call light appropriately. Hourly rounding completed. Problem: Discharge Planning:  Goal: Discharged to appropriate level of care  Description  Discharged to appropriate level of care  Outcome: Ongoing  Note:   Patient is from home. Plans are to return home once medically stable. Possibly today. Problem: Serum Glucose Level - Abnormal:  Goal: Ability to maintain appropriate glucose levels will improve  Description  Ability to maintain appropriate glucose levels will improve  Outcome: Ongoing  Note:   Patient started shift on an Insulin drip. Was advanced to sliding scale coverage and Lantus. Chems ac/hs. Problem: Sensory Perception - Impaired:  Goal: Ability to maintain a stable neurologic state will improve  Description  Ability to maintain a stable neurologic state will improve  Outcome: Ongoing  Note:   Patient is alert and oriented x 4. Problem: Skin Integrity:  Goal: Absence of new skin breakdown  Description  Absence of new skin breakdown  Outcome: Ongoing  Note:   Patient is able to turn self appropriately. Scattered bruising and abrasions noted.      Care plan reviewed

## 2019-08-13 NOTE — DISCHARGE SUMMARY
in October 2017 with a hemoglobin A1c of 7. On discussion with patient, patient asked why the change in his hemoglobin A1c. Patient states that he is eating more bread ureide patient reports that he typically drinks juice. Patient advised to cut out juices along with cut down on his amount of bread. Patient states that he does not have any difficulties in being able to get his insulin and states that he has these medications at home. 3. Anion gap metabolic acidosis-patient found to have an increased anion gap. This is not secondary to DKA. Anion gap is most likely secondary to lactic acidosis given patient elevated lactic. Patient given IV fluid hydration. Patient's anion gap back to normal by discharge. 4. Lactic acidosis-patient with lactic acid of 4.4 on admission. This is most likely secondary to dehydration. Patient given IV fluid hydration. Visions pro-Homar was 0. 11. Antibiotics initiated secondary to patient not giving any sources for of infection. 5. Abdominal pain-patient reports that for the past week he has had right lower quadrant abdominal pain. Patient had a CT of the abdomen which did not show any acute pathology. Lateral renal stones were seen. Patient states that he does have a history of IBS and reports that he is supposed to follow-up with GI due to having polyps a few years ago. Patient states that he is currently in the process of making an appointment with them. It should follow-up with GI in the next 1 to 2 weeks. 6. Urinary retention-patient noted to have urinary retention during hospitalization. Patient unable to void with a bladder of 400 cc. Urinary catheter placed and patient discharged with urinary catheter in place. Patient to follow-up with urology in 1 to 2 weeks. Patient states that he is scheduled to see urology secondary to his bilateral kidney stones. 7. Bilateral kidney stones-patient states that he is following up with urology for removal of the stones. Output 0 ml   Net 4143.59 ml       General appearance - alert, well appearing, and in no distress  Chest - clear to auscultation, no wheezes, rales or rhonchi, symmetric air entry  Heart - normal rate, regular rhythm, normal S1, S2, no murmurs, rubs, clicks or gallops  Abdomen - soft, right lower quadrant tenderness, nondistended, no masses or organomegaly  Obese: No; Protuberant: No   Neurological - alert, oriented, normal speech, no focal findings or movement disorder noted  Extremities - peripheral pulses normal, no pedal edema, no clubbing or cyanosis  Skin - normal coloration and turgor, no rashes, no suspicious skin lesions noted    Procedures: None    Diagnostic Test: CBC, BMP    Radiology reports as per the Radiologist  Radiology: Ct Abdomen Pelvis Wo Contrast Additional Contrast? None    Result Date: 8/12/2019  PROCEDURE: CT ABDOMEN PELVIS WO CONTRAST CLINICAL INFORMATION: abd. pain . COMPARISON: June 22, 2019 TECHNIQUE: Axial 5 mm CT images were obtained through the abdomen and pelvis. No contrast was given. Coronal reconstructions were obtained. All CT scans at this facility use dose modulation, iterative reconstruction, and/or weight-based dosing when appropriate to reduce radiation dose to as low as reasonably achievable. FINDINGS:  The lung bases and cardiac chambers are grossly unremarkable. The noncontrast appearance of the liver, pancreas, and spleen are negative. Postcholecystectomy changes are present. The gastric body is nondistended there is no focal wall thickening. Nonobstructing punctate bilateral renal calculi are present. The ureters are patent. The urinary bladder is unremarkable. There is no perinephric stranding. The exam is negative for visualized bowel obstruction or inflammatory wall changes. The appendix is normal. The urinary bladder and prostate are grossly unremarkable. The skeleton is negative for active or suspicious pathology. There is an IVC filter visualized.  There is no RDW-CV 12.3 11.5 - 14.5 %    RDW-SD 37.2 35.0 - 45.0 fL    Platelets 201 982 - 793 thou/mm3    MPV 10.1 9.4 - 12.4 fL    Seg Neutrophils 55.8 %    Lymphocytes 30.4 %    Monocytes 10.3 %    Eosinophils 2.7 %    Basophils 0.4 %    Immature Granulocytes 0 %    Segs Absolute 3.2 1.8 - 7.7 thou/mm3    Lymphocytes # 1.7 1.0 - 4.8 thou/mm3    Monocytes # 0.6 0.4 - 1.3 thou/mm3    Eosinophils # 0.2 0.0 - 0.4 thou/mm3    Basophils # 0.0 0.0 - 0.1 thou/mm3    Immature Grans (Abs) 0.02 0.00 - 0.07 thou/mm3    nRBC 0 /100 wbc   Blood gas, arterial   Result Value Ref Range    pH, Blood Gas 7.43 7.35 - 7.45    PCO2 38 35 - 45 mmhg    PO2 64 (L) 71 - 104 mmhg    HCO3 25 23 - 28 mmol/l    Base Excess (Calculated) 0.8 -2.5 - 2.5 mmol/l    O2 Sat 93 %    DEVICE Room Air     Juan José Test Positive     Source: R Radial     COLLECTED BY: [id number]    Basic Metabolic Panel   Result Value Ref Range    Sodium 134 (L) 135 - 145 meq/L    Potassium 3.1 (L) 3.5 - 5.2 meq/L    Chloride 91 (L) 98 - 111 meq/L    CO2 22 (L) 23 - 33 meq/L    Glucose 583 (HH) 70 - 108 mg/dL    BUN 6 (L) 7 - 22 mg/dL    CREATININE 0.6 0.4 - 1.2 mg/dL    Calcium 9.2 8.5 - 10.5 mg/dL   Hepatic function panel   Result Value Ref Range    Alb 4.2 3.5 - 5.1 g/dL    Total Bilirubin 1.9 (H) 0.3 - 1.2 mg/dL    Bilirubin, Direct 0.4 (H) 0.0 - 0.3 mg/dL    Alkaline Phosphatase 91 38 - 126 U/L    AST 15 5 - 40 U/L    ALT 21 11 - 66 U/L    Total Protein 7.1 6.1 - 8.0 g/dL   Lipase   Result Value Ref Range    Lipase 19.3 5.6 - 51.3 U/L   Troponin   Result Value Ref Range    Troponin T < 0.010 ng/ml   Beta-Hydroxybutyrate   Result Value Ref Range    Beta-Hydroxybutyrate 1.45 0.20 - 2.81 mg/dl   Urinalysis with microscopic   Result Value Ref Range    Glucose, Urine >= 1000 (A) NEGATIVE mg/dl    Bilirubin Urine NEGATIVE NEGATIVE    Ketones, Urine NEGATIVE NEGATIVE    Specific Gravity, UA >1.030 (A) 1.002 - 1.03    Blood, Urine NEGATIVE NEGATIVE    pH, UA 6.0 5.0 - 9.0    Protein, UA NEGATIVE NEGATIVE mg/dl    Urobilinogen, Urine 0.2 0.0 - 1.0 eu/dl    Nitrite, Urine NEGATIVE NEGATIVE    Leukocyte Esterase, Urine NEGATIVE NEGATIVE    Color, UA YELLOW YELLOW-STR    Character, Urine CLEAR CLR-SL.LULA    RBC, UA NONE 0-2/hpf /hpf    WBC, UA NONE 0-4/hpf /hpf    Epi Cells NONE 3-5/hpf /hpf    Bacteria, UA NONE FEW/NONE S    Casts NONE SEEN NONE SEEN /lpf    Crystals NONE SEEN NONE SEEN    Renal Epithelial, Urine NONE SEEN NONE SEEN    Yeast, UA NONE SEEN NONE SEEN    Casts NONE SEEN /lpf    Miscellaneous Lab Test Result NONE SEEN    Hemoglobin A1c   Result Value Ref Range    Hemoglobin A1C 10.2 (H) 4.4 - 6.4 %    AVERAGE GLUCOSE 246 (H) 70 - 126 mg/dL   Lactate, Sepsis   Result Value Ref Range    Lactic Acid, Sepsis 4.4 (H) 0.5 - 1.9 mmol/L   Lactate, Sepsis   Result Value Ref Range    Lactic Acid, Sepsis 3.2 (H) 0.5 - 1.9 mmol/L   Procalcitonin   Result Value Ref Range    Procalcitonin 0.11 (H) 0.01 - 0.09 ng/mL   Anion Gap   Result Value Ref Range    Anion Gap 21.0 (H) 8.0 - 16.0 meq/L   Osmolality   Result Value Ref Range    Osmolality Calc 292.8 275.0 - 300 mOsmol/kg   Glomerular Filtration Rate, Estimated   Result Value Ref Range    Est, Glom Filt Rate >90 ml/min/1.73m2   MRSA by PCR   Result Value Ref Range    MRSA SCREEN RT-PCR NEGATIVE    CBC auto differential   Result Value Ref Range    WBC 9.1 4.8 - 10.8 thou/mm3    RBC 5.07 4.70 - 6.10 mill/mm3    Hemoglobin 15.7 14.0 - 18.0 gm/dl    Hematocrit 43.0 42.0 - 52.0 %    MCV 84.8 80.0 - 94.0 fL    MCH 31.0 26.0 - 33.0 pg    MCHC 36.5 (H) 32.2 - 35.5 gm/dl    RDW-CV 12.5 11.5 - 14.5 %    RDW-SD 37.4 35.0 - 45.0 fL    Platelets 945 703 - 302 thou/mm3    MPV 9.9 9.4 - 12.4 fL    Seg Neutrophils 49.8 %    Lymphocytes 36.7 %    Monocytes 9.2 %    Eosinophils 3.6 %    Basophils 0.4 %    Immature Granulocytes 0 %    Segs Absolute 4.5 1.8 - 7.7 thou/mm3    Lymphocytes # 3.3 1.0 - 4.8 thou/mm3    Monocytes # 0.8 0.4 - 1.3 thou/mm3    Eosinophils # 0.3 degrees    T Axis 22 degrees       Diet:  DIET CARB CONTROL; Carb Control: 4 carb choices (60 gms)/meal    Activity:  Up ad tiffani (Patient can move independently)    Follow-up:  in 1-2 weeks with Jaquan Collado MD, follow-up with urology as scheduled, follow-up with GI in 1 to 2 weeks    Disposition: home    Condition: Stable    Time Spent: 35 minutes    Electronically signed by Ruthie Rizzo MD on 8/13/2019 at 6:05 PM    Discharging Hospitalist

## 2019-08-13 NOTE — H&P
pain, diarrhea and nausea. Negative for abdominal distention, constipation and vomiting. Endocrine: Positive for polydipsia and polyuria. Negative for polyphagia. Genitourinary: Positive for flank pain and frequency. Negative for decreased urine volume, difficulty urinating and urgency. Musculoskeletal: Negative for back pain, gait problem, neck pain and neck stiffness. Skin: Negative for color change, pallor, rash and wound. Allergic/Immunologic: Negative for food allergies and immunocompromised state. Neurological: Negative for dizziness, syncope, weakness and numbness. Hematological: Negative for adenopathy. Psychiatric/Behavioral: Negative for agitation, confusion and hallucinations. The patient is not nervous/anxious. PMH:  Per HPI  SHX:  Lifetime nonsmoker, denies ETOH use, denies drug use   FHX: Mother: Arthritis, seizures Father: heart disease   Allergies: Ciprofloxacin, metformin, tramadol, heparin, ultram, warfarin   Medications:     sodium chloride 100 mL/hr at 08/13/19 0104    insulin (HUMAN R) non-weight based infusion 4.6 Units/hr (08/13/19 0104)      atorvastatin  10 mg Oral Nightly    divalproex  500 mg Oral BID    gabapentin  600 mg Oral 4x Daily    lacosamide  300 mg Oral BID    levETIRAcetam  2,000 mg Oral BID    melatonin  30 mg Oral Nightly    metoprolol tartrate  75 mg Oral BID    pantoprazole  40 mg Oral BID AC    rOPINIRole  1 mg Oral TID    zonisamide  500 mg Oral Nightly    sodium chloride flush  10 mL Intravenous 2 times per day    DULoxetine  90 mg Oral Daily    magnesium replacement protocol   Other RX Placeholder    potassium replacement protocol   Other RX Placeholder       Vital Signs: T: 98.3 P: 91 RR: 18 B/P: 130/82: FiO2: 0: O2 Sat:96: I/O: 0  General:   Acute on chronically ill appearing white male   HEENT:  normocephalic and atraumatic. No scleral icterus. Neck: supple. No JVD. Lungs: clear to auscultation.   No retractions  Cardiac:

## 2019-08-14 ENCOUNTER — TELEPHONE (OUTPATIENT)
Dept: INTERNAL MEDICINE CLINIC | Age: 49
End: 2019-08-14

## 2019-08-14 ENCOUNTER — OFFICE VISIT (OUTPATIENT)
Dept: UROLOGY | Age: 49
End: 2019-08-14
Payer: MEDICARE

## 2019-08-14 ENCOUNTER — TELEPHONE (OUTPATIENT)
Dept: UROLOGY | Age: 49
End: 2019-08-14

## 2019-08-14 ENCOUNTER — CARE COORDINATION (OUTPATIENT)
Dept: CASE MANAGEMENT | Age: 49
End: 2019-08-14

## 2019-08-14 ENCOUNTER — TELEPHONE (OUTPATIENT)
Dept: PHARMACY | Facility: CLINIC | Age: 49
End: 2019-08-14

## 2019-08-14 VITALS
WEIGHT: 228 LBS | SYSTOLIC BLOOD PRESSURE: 122 MMHG | DIASTOLIC BLOOD PRESSURE: 86 MMHG | BODY MASS INDEX: 29.26 KG/M2 | HEIGHT: 74 IN

## 2019-08-14 DIAGNOSIS — R33.9 INCOMPLETE BLADDER EMPTYING: Primary | ICD-10-CM

## 2019-08-14 LAB — URINE CULTURE, ROUTINE: NORMAL

## 2019-08-14 PROCEDURE — 1111F DSCHRG MED/CURRENT MED MERGE: CPT | Performed by: UROLOGY

## 2019-08-14 PROCEDURE — 1036F TOBACCO NON-USER: CPT | Performed by: UROLOGY

## 2019-08-14 PROCEDURE — G8417 CALC BMI ABV UP PARAM F/U: HCPCS | Performed by: UROLOGY

## 2019-08-14 PROCEDURE — 99202 OFFICE O/P NEW SF 15 MIN: CPT | Performed by: UROLOGY

## 2019-08-14 PROCEDURE — G8428 CUR MEDS NOT DOCUMENT: HCPCS | Performed by: UROLOGY

## 2019-08-14 PROCEDURE — G8599 NO ASA/ANTIPLAT THER USE RNG: HCPCS | Performed by: UROLOGY

## 2019-08-14 ASSESSMENT — ENCOUNTER SYMPTOMS
EYE REDNESS: 0
ABDOMINAL PAIN: 1
COLOR CHANGE: 0
SHORTNESS OF BREATH: 0
CHEST TIGHTNESS: 0
VOMITING: 1
FACIAL SWELLING: 0
EYE PAIN: 0

## 2019-08-14 NOTE — PROGRESS NOTES
[address] [facility],[place] [address]  Dept: [phone]  Loc: [phone]  Visit Date: 8/14/2019    Subjective:      Patient ID: Ruth Carey 52 y.o. male 1970    Chief Complaint   Patient presents with    Urinary Retention     follow up ER -  Gross hematuria / leaking around catheter        HPI white male is referred today for acute urinary retention. Patient has uncontrolled diabetes. He was first diagnosed with this 20 years ago. Urologically he complains of frequency every 30 minutes nocturia x4 and urgency. He attributes all of this to his uncontrolled diabetes. Into the emergency department because of hyperglycemia. During the evaluation he was found to have a distended bladder. A Angeles catheter was inserted and 400 mL of urine were drained. He is complaining of urgency, discomfort from the catheter and hematuria. He has a history of nephrolithiasis. A CT scan of the abdomen and pelvis without contrast was obtained, which I have reviewed. He has a tiny stone in the lower pole of each kidney. His bladder was full on the imaging study.   His prostate appears small and normal.    Past Medical History:   Diagnosis Date    Acid reflux     Anemia     previously seen by Dr. Pam Ulrich (due to enlarged spleen)    Bipolar disorder ([facility]     Blood clot in vein 4/7/16    Sil Waggoner     [facility]) 04/2019    thyroid    Chest pain     previously seeing [address] [place] [address] cardiologist, now seeing Dr. Ambar Irwin (LAD bridging on cath 4/2016)    Chronic back pain     Chronic bronchitis ([facility]     Dr. Piter Rodgers 10/2012 - no longer following with him    Colon polyp 08/30/2016    Depression     Dr. Sonal Estrada DVT (deep venous thrombosis) ([facility] [id number]    not on Coumadin due to unable to regulate - Dr. Mehdi Khan - no etiology found per patient    Esophageal abnormality     nodule     Fatty liver disease, nonalcoholic     U/S 59/5007 [facility]    tobacco: Never Used   Substance and Sexual Activity    Alcohol use: No     Alcohol/week: 0.0 standard drinks    Drug use: No    Sexual activity: Not on file   Lifestyle    Physical activity:     Days per week: Not on file     Minutes per session: Not on file    Stress: Not on file   Relationships    Social connections:     Talks on phone: Not on file     Gets together: Not on file     Attends Bahai service: Not on file     Active member of club or organization: Not on file     Attends meetings of clubs or organizations: Not on file     Relationship status: Not on file    Intimate partner violence:     Fear of current or ex partner: Not on file     Emotionally abused: Not on file     Physically abused: Not on file     Forced sexual activity: Not on file   Other Topics Concern    Not on file   Social History Narrative    Not on file       Family History   Problem Relation Age of Onset    Heart Disease Father 61        MI    Stroke Brother     Obesity Brother     Arthritis Mother     Seizures Mother     Obesity Sister     Other Brother         pulmonary embolism    Diabetes Daughter     Seizures Brother        Past Surgical History:   Procedure Laterality Date    CARDIAC CATHETERIZATION      2011,5-6 years ago   [address]  3-2012   [address]  04/28/2016    [facility]     CARPAL TUNNEL RELEASE  01/2017    CHOLECYSTECTOMY  2004    COLONOSCOPY  2012    COLONOSCOPY  08/2016    2 tubular adenomas - reportedly needs repeat scope in 6 months    CRANIOTOMY  11/2012    arachnoid cyst drainage    EKG 12-LEAD  10/18/2015         ENDOSCOPY, COLON, DIAGNOSTIC      HERNIA REPAIR Right 1996    [facility]--Dr. Marco Toribio    INGUINAL HERNIA REPAIR Right 09/15/2016    Robotic assisted    JOINT REPLACEMENT      KNEE ARTHROSCOPY Right 2016    KNEE SURGERY Left 2007    acl and debrided twice    OTHER SURGICAL HISTORY  5-31-11    Tilt table was associated w/ nonspecific symptoms or nausea, of Systems   Constitutional: Negative for chills and fever. HENT: Negative for ear pain and facial swelling. Eyes: Negative for pain and redness. Respiratory: Negative for chest tightness and shortness of breath. Cardiovascular: Negative for chest pain and leg swelling. Gastrointestinal: Positive for abdominal pain and vomiting. Endocrine: Negative for cold intolerance and heat intolerance. Genitourinary: Positive for difficulty urinating and hematuria. Negative for urgency. Musculoskeletal: Negative for neck pain and neck stiffness. Skin: Negative for color change and rash. Allergic/Immunologic: Negative for environmental allergies and food allergies. Neurological: Positive for dizziness and light-headedness. Hematological: Does not bruise/bleed easily. /86   Ht 6' 2\" (1.88 m)   Wt 228 lb (103.4 kg)   BMI 29.27 kg/m²     Objective:   Physical Exam   Constitutional: He is oriented to person, place, and time. He appears well-developed and well-nourished. HENT:   Head: Normocephalic and atraumatic. Eyes: Pupils are equal, round, and reactive to light. EOM are normal.   Abdominal: Soft. Bowel sounds are normal.   Genitourinary: Testes normal and penis normal.   Genitourinary Comments: Angeles catheters in place. Neurological: He is alert and oriented to person, place, and time. Skin: Skin is warm and dry. Psychiatric: He has a normal mood and affect. Vitals reviewed. Assessment:       Diagnosis Orders   1. Incomplete bladder emptying         Mr. Mackenziepresents today in consultation for Incomplete bladder emptying [R33.9]. Patient may have a hypotonic bladder secondary to long-standing, uncontrolled diabetes mellitus. Currently, the patient is having bladder spasms and leakage of urine around the catheter. The catheter is removed today. I told him the worst case scenario would be acute urinary retention requiring a visit to the emergency department.

## 2019-08-14 NOTE — TELEPHONE ENCOUNTER
CLINICAL PHARMACY CONSULTATION: Transition of Care (TERE)  -----------------------------------------------------------------------------------------------  Suzanna Martinez is a 52 y.o. male  who was discharged from [facility] on 8/13/19 with a diagnosis of Type 2 Diabetes Mellitus with manifestations. Attempt made to contact pt. Left msg on home/mobile TAD requesting call back at [phone] or [phone] option 7. Will continue to attempt to contact pt as appropriate.     Shahriar Castillo, PharmD, [place] SOPHIA Gandhi Pharmacist  C: [phone]    O: [phone]  Toll free: [phone], option 7    TCM Call Made?: Yes

## 2019-08-14 NOTE — TELEPHONE ENCOUNTER
Pt called in saying he was discharged last night from hospital.   They discharged him with a catheter. He says it has been painful since last night about 8:30 pm.  Every time he sits up. Pt states he was laying flat most of the time he was in hospital.    Per vo from [facility], I called urology to see if someone could take a look at it and make it more comfortable for him. They will check with one of the nurse practitioners and call the pt directly. I notified pt Dr. Lotus Osler office will call him back with a time to see him. He verbalizes understanding.

## 2019-08-14 NOTE — TELEPHONE ENCOUNTER
Kelsie Ortiz CNO office called and stated Dr. Derek Guillaume was scheduled to see pt as a new pt on 8/22, he went into ER for some sugar issues, and then went into retention lyesterday. They placed a gallego cath and since about 830 pm last night. He is having some severe pain from the cath. They believe maybe it's not in right and wants someone to check him today. She was worried bout the amount of pain it is causing the patient. I scheduled patient for this afternoon. Thank you. I spoke to patient to confirm his appt and he said he has emptied his gallego twice and it's been full of bright red blood.

## 2019-08-15 ENCOUNTER — CARE COORDINATION (OUTPATIENT)
Dept: CARE COORDINATION | Age: 49
End: 2019-08-15

## 2019-08-15 LAB — MRSA SCREEN: NORMAL

## 2019-08-18 LAB
BLOOD CULTURE, ROUTINE: NORMAL
BLOOD CULTURE, ROUTINE: NORMAL

## 2019-08-19 ENCOUNTER — CLINICAL DOCUMENTATION (OUTPATIENT)
Dept: PHARMACY | Facility: CLINIC | Age: 49
End: 2019-08-19

## 2019-08-20 NOTE — TELEPHONE ENCOUNTER
[facility] Transitions Initial Follow Up Call    Outreach made within 2 business days of discharge: Yes    Patient: Elizabeth Herrmann Patient : 1970   MRN: [id number]  Reason for Admission: There are no discharge diagnoses documented for the most recent discharge. Discharge Date: 19       Spoke with: TATIANNA ON VM    Discharge department/facility: [facility] Interactive Patient Contact:  Was patient able to fill all prescriptions:   Was patient instructed to bring all medications to the follow-up visit:   Is patient taking all medications as directed in the discharge summary?   Does patient understand their discharge instructions:   Does patient have questions or concerns that need addressed prior to 7-14 day follow up office visit:     Scheduled appointment with PCP within 7-14 days    Follow Up  Future Appointments   Date Time Provider Staci Jay   2019  1:30 PM STR ULTRASOUND RM 2 STRZ US STR Radiolog   2019  2:15 PM MD JESSENIA Landaverde AMENELUCIANO II.CRISTINE Urology [facility] - JESSENIA DOMÍNGUEZ II.VIERTDANIA   2019  3:30 PM Adenike Jane MD SRPX Physic [facility] [facility]   2019  9:00 AM Roosevelt Cheadle, MD Adv Surg [facility] - Jules Florence CMA ([address] [place] [address])

## 2019-08-21 ENCOUNTER — TELEPHONE (OUTPATIENT)
Dept: UROLOGY | Age: 49
End: 2019-08-21

## 2019-08-21 ENCOUNTER — CARE COORDINATION (OUTPATIENT)
Dept: CARE COORDINATION | Age: 49
End: 2019-08-21

## 2019-08-22 ENCOUNTER — OFFICE VISIT (OUTPATIENT)
Dept: UROLOGY | Age: 49
End: 2019-08-22
Payer: MEDICARE

## 2019-08-22 VITALS
SYSTOLIC BLOOD PRESSURE: 128 MMHG | HEIGHT: 75 IN | WEIGHT: 233 LBS | DIASTOLIC BLOOD PRESSURE: 72 MMHG | BODY MASS INDEX: 28.97 KG/M2

## 2019-08-22 DIAGNOSIS — R33.9 INCOMPLETE BLADDER EMPTYING: Primary | ICD-10-CM

## 2019-08-22 LAB — POST VOID RESIDUAL (PVR): 0 ML

## 2019-08-22 PROCEDURE — 51798 US URINE CAPACITY MEASURE: CPT | Performed by: UROLOGY

## 2019-08-22 PROCEDURE — G8417 CALC BMI ABV UP PARAM F/U: HCPCS | Performed by: UROLOGY

## 2019-08-22 PROCEDURE — 99211 OFF/OP EST MAY X REQ PHY/QHP: CPT | Performed by: UROLOGY

## 2019-08-22 PROCEDURE — G8427 DOCREV CUR MEDS BY ELIG CLIN: HCPCS | Performed by: UROLOGY

## 2019-08-22 ASSESSMENT — ENCOUNTER SYMPTOMS
SHORTNESS OF BREATH: 0
CONSTIPATION: 0
BACK PAIN: 1
DIARRHEA: 0

## 2019-08-22 NOTE — PROGRESS NOTES
Bladder scan was performed with a PVR of 0ml. Provider was notified.
Reviewed, released, authenticated and confirmed by Dr. Osman Roldan.
2 times daily. , Disp: , Rfl:     Cyanocobalamin (VITAMIN B-12) 2500 MCG SUBL, Place 2,500 mcg under the tongue daily, Disp: , Rfl:     sildenafil (VIAGRA) 100 MG tablet, Take 1 tablet by mouth as needed for Erectile Dysfunction, Disp: 45 tablet, Rfl: 3    metoprolol tartrate (LOPRESSOR) 50 MG tablet, Take 1 tablet by mouth 2 times daily (Patient taking differently: Take 75 mg by mouth 2 times daily ), Disp: 180 tablet, Rfl: 1    divalproex (DEPAKOTE) 500 MG DR tablet, Take 500 mg by mouth 2 times daily , Disp: , Rfl:     Blood Glucose Monitoring Suppl (ONE TOUCH BASIC SYSTEM) W/DEVICE KIT, Use to test blood glucose level 4 times per day. Diagnosis: E11.40, Disp: 1 kit, Rfl: 0    clindamycin (CLEOCIN T) 1 % external solution, Apply topically 2 times daily. , Disp: 30 mL, Rfl: 5    levETIRAcetam (KEPPRA) 500 MG tablet, Take 2,000 mg by mouth 2 times daily , Disp: , Rfl:     LORazepam (ATIVAN) 0.5 MG tablet, Take 0.5 mg by mouth 2 times daily as needed (seizures) , Disp: , Rfl:     zonisamide (ZONEGRAN) 100 MG capsule, Take 500 mg by mouth nightly , Disp: , Rfl:     Review of Systems   Constitutional: Negative for chills and fever. HENT: Negative for hearing loss and mouth sores. Respiratory: Negative for shortness of breath. Cardiovascular: Negative for chest pain and palpitations. Gastrointestinal: Negative for constipation and diarrhea. Genitourinary: Positive for flank pain and frequency. Negative for difficulty urinating and dysuria. Musculoskeletal: Positive for back pain. Negative for joint swelling. Skin: Negative for rash and wound. Allergic/Immunologic: Negative for environmental allergies and food allergies. Neurological: Positive for seizures and numbness. Negative for headaches. Hematological: Does not bruise/bleed easily. Ht 6' 3\" (1.905 m)   Wt 233 lb (105.7 kg)   BMI 29.12 kg/m²     Objective:   Physical Exam    Assessment:       Diagnosis Orders   1.  Incomplete

## 2019-08-23 NOTE — TELEPHONE ENCOUNTER
Can we get James Llamas to check out if he would qualify for assistance with Invokana, or Jardiance. I need some FSBS numbers to see how to adjust insulin.

## 2019-08-29 ENCOUNTER — CARE COORDINATION (OUTPATIENT)
Dept: CARE COORDINATION | Age: 49
End: 2019-08-29

## 2019-09-04 ENCOUNTER — TELEPHONE (OUTPATIENT)
Dept: PHARMACY | Age: 49
End: 2019-09-04

## 2019-09-05 ENCOUNTER — OFFICE VISIT (OUTPATIENT)
Dept: INTERNAL MEDICINE CLINIC | Age: 49
End: 2019-09-05
Payer: MEDICARE

## 2019-09-05 ENCOUNTER — CARE COORDINATION (OUTPATIENT)
Dept: CARE COORDINATION | Age: 49
End: 2019-09-05

## 2019-09-05 VITALS
WEIGHT: 233.8 LBS | DIASTOLIC BLOOD PRESSURE: 76 MMHG | SYSTOLIC BLOOD PRESSURE: 134 MMHG | HEART RATE: 98 BPM | HEIGHT: 74 IN | BODY MASS INDEX: 30.01 KG/M2

## 2019-09-05 DIAGNOSIS — R11.2 NON-INTRACTABLE VOMITING WITH NAUSEA, UNSPECIFIED VOMITING TYPE: Primary | ICD-10-CM

## 2019-09-05 DIAGNOSIS — R82.998 ELEVATED URINARY FREE CORTISOL LEVEL: ICD-10-CM

## 2019-09-05 DIAGNOSIS — E11.40 TYPE 2 DIABETES MELLITUS WITH DIABETIC NEUROPATHY, WITH LONG-TERM CURRENT USE OF INSULIN (HCC): ICD-10-CM

## 2019-09-05 DIAGNOSIS — E87.6 HYPOKALEMIA: ICD-10-CM

## 2019-09-05 DIAGNOSIS — E04.1 THYROID NODULE: ICD-10-CM

## 2019-09-05 DIAGNOSIS — R89.9 ABNORMAL THYROID BIOPSY: ICD-10-CM

## 2019-09-05 DIAGNOSIS — Z79.4 TYPE 2 DIABETES MELLITUS WITH DIABETIC NEUROPATHY, WITH LONG-TERM CURRENT USE OF INSULIN (HCC): ICD-10-CM

## 2019-09-05 DIAGNOSIS — E87.20 LACTIC ACIDOSIS: ICD-10-CM

## 2019-09-05 PROCEDURE — G8417 CALC BMI ABV UP PARAM F/U: HCPCS | Performed by: INTERNAL MEDICINE

## 2019-09-05 PROCEDURE — 1111F DSCHRG MED/CURRENT MED MERGE: CPT | Performed by: INTERNAL MEDICINE

## 2019-09-05 PROCEDURE — 1036F TOBACCO NON-USER: CPT | Performed by: INTERNAL MEDICINE

## 2019-09-05 PROCEDURE — G8599 NO ASA/ANTIPLAT THER USE RNG: HCPCS | Performed by: INTERNAL MEDICINE

## 2019-09-05 PROCEDURE — G8427 DOCREV CUR MEDS BY ELIG CLIN: HCPCS | Performed by: INTERNAL MEDICINE

## 2019-09-05 PROCEDURE — 3046F HEMOGLOBIN A1C LEVEL >9.0%: CPT | Performed by: INTERNAL MEDICINE

## 2019-09-05 PROCEDURE — 2022F DILAT RTA XM EVC RTNOPTHY: CPT | Performed by: INTERNAL MEDICINE

## 2019-09-05 PROCEDURE — 99214 OFFICE O/P EST MOD 30 MIN: CPT | Performed by: INTERNAL MEDICINE

## 2019-09-05 RX ORDER — PROMETHAZINE HYDROCHLORIDE 12.5 MG/1
12.5-25 TABLET ORAL EVERY 8 HOURS PRN
Qty: 60 TABLET | Refills: 2 | Status: SHIPPED | OUTPATIENT
Start: 2019-09-05 | End: 2021-04-13 | Stop reason: SDUPTHER

## 2019-09-05 NOTE — CARE COORDINATION
(KEPPRA) 500 MG tablet Take 2,000 mg by mouth 2 times daily     Historical Provider, MD   LORazepam (ATIVAN) 0.5 MG tablet Take 0.5 mg by mouth 2 times daily as needed (seizures)     Historical Provider, MD   zonisamide (ZONEGRAN) 100 MG capsule Take 500 mg by mouth nightly     Historical Provider, MD       Future Appointments   Date Time Provider Staci Misty   9/16/2019  7:40 AM STR CT IMAGING RM1  [facility] STR[facility] OUT EXP STR Radiolog   9/25/2019  9:00 AM Delroy Gross MD Adv Surg [facility] VICKIE DOMÍNGUEZ II.CRISTINE

## 2019-09-09 ENCOUNTER — NURSE ONLY (OUTPATIENT)
Dept: LAB | Age: 49
End: 2019-09-09

## 2019-09-09 ENCOUNTER — TELEPHONE (OUTPATIENT)
Dept: PHARMACY | Age: 49
End: 2019-09-09

## 2019-09-09 DIAGNOSIS — R89.9 ABNORMAL THYROID BIOPSY: ICD-10-CM

## 2019-09-09 DIAGNOSIS — E04.1 THYROID NODULE: ICD-10-CM

## 2019-09-09 DIAGNOSIS — R82.998 ELEVATED URINARY FREE CORTISOL LEVEL: ICD-10-CM

## 2019-09-09 DIAGNOSIS — E87.20 LACTIC ACIDOSIS: ICD-10-CM

## 2019-09-09 DIAGNOSIS — E87.6 HYPOKALEMIA: ICD-10-CM

## 2019-09-09 LAB
ANION GAP SERPL CALCULATED.3IONS-SCNC: 12 MEQ/L (ref 8–16)
BUN BLDV-MCNC: 9 MG/DL (ref 7–22)
CALCIUM SERPL-MCNC: 9 MG/DL (ref 8.5–10.5)
CHLORIDE BLD-SCNC: 106 MEQ/L (ref 98–111)
CO2: 27 MEQ/L (ref 23–33)
CREAT SERPL-MCNC: 0.6 MG/DL (ref 0.4–1.2)
GFR SERPL CREATININE-BSD FRML MDRD: > 90 ML/MIN/1.73M2
GLUCOSE BLD-MCNC: 168 MG/DL (ref 70–108)
POTASSIUM SERPL-SCNC: 3.5 MEQ/L (ref 3.5–5.2)
SODIUM BLD-SCNC: 145 MEQ/L (ref 135–145)
TSH SERPL DL<=0.05 MIU/L-ACNC: 1.74 UIU/ML (ref 0.4–4.2)

## 2019-09-12 ENCOUNTER — TELEPHONE (OUTPATIENT)
Dept: INTERNAL MEDICINE CLINIC | Age: 49
End: 2019-09-12

## 2019-09-12 NOTE — TELEPHONE ENCOUNTER
----- Message from Mara Santana MD sent at 9/11/2019  2:28 PM EDT -----  Let him know labs that are back are normal.

## 2019-09-16 ENCOUNTER — CARE COORDINATION (OUTPATIENT)
Dept: CARE COORDINATION | Age: 49
End: 2019-09-16

## 2019-09-16 ENCOUNTER — HOSPITAL ENCOUNTER (OUTPATIENT)
Dept: CT IMAGING | Age: 49
Discharge: HOME | End: 2019-09-16
Payer: MEDICARE

## 2019-09-16 DIAGNOSIS — R19.7 DIARRHEA, UNSPECIFIED TYPE: ICD-10-CM

## 2019-09-16 DIAGNOSIS — R11.2 NAUSEA AND VOMITING, INTRACTABILITY OF VOMITING NOT SPECIFIED, UNSPECIFIED VOMITING TYPE: ICD-10-CM

## 2019-09-16 DIAGNOSIS — R10.30 LOWER ABDOMINAL PAIN: ICD-10-CM

## 2019-09-16 PROCEDURE — 6360000004 HC RX CONTRAST MEDICATION: Performed by: NURSE PRACTITIONER

## 2019-09-16 PROCEDURE — 74177 CT ABD & PELVIS W/CONTRAST: CPT

## 2019-09-16 RX ADMIN — IOPAMIDOL 85 ML: 755 INJECTION, SOLUTION INTRAVENOUS at 07:44

## 2019-09-16 RX ADMIN — IOHEXOL 50 ML: 240 INJECTION, SOLUTION INTRATHECAL; INTRAVASCULAR; INTRAVENOUS; ORAL at 07:44

## 2019-09-17 ENCOUNTER — CARE COORDINATION (OUTPATIENT)
Dept: CARE COORDINATION | Age: 49
End: 2019-09-17

## 2019-09-17 DIAGNOSIS — R07.9 CHEST PAIN, UNSPECIFIED TYPE: ICD-10-CM

## 2019-09-17 DIAGNOSIS — J90 PLEURAL EFFUSION, BILATERAL: Primary | ICD-10-CM

## 2019-09-17 NOTE — TELEPHONE ENCOUNTER
Will order ECHO to evaluate for CHF with the effusions but mild. Offered stress test but patient declined (having CP Sunday but none since). He has been out of Levemir a week. FSBS high and low as trying to compensate with short acting insulin. He is to bring in financial paperwork for Benny Diamond to work on getting insulin tomorrow. Please give him Tresiba samples and ask if needs short acting insulin (we have some off brand Apidra in there that he could have). New dose on Tresiba (give Tresiba Max if we have it - less volume at these higher doses)- 90 Units once a day, call in FSBS on Monday. May go up 5 Units a day if am sugar >250. He is currently on Levemir 90 in am and 65 in pm but don't need as much of [place] typically so trying to find out dose that works best.    Can we give any samples of Jardiance, Farxiga, [facility] and have Benny Diamond check if she can get him that cheap too. (He can't afford the Invokana).

## 2019-09-18 DIAGNOSIS — Z79.4 TYPE 2 DIABETES MELLITUS WITH HYPERGLYCEMIA, WITH LONG-TERM CURRENT USE OF INSULIN (HCC): Primary | ICD-10-CM

## 2019-09-18 DIAGNOSIS — E11.65 TYPE 2 DIABETES MELLITUS WITH HYPERGLYCEMIA, WITH LONG-TERM CURRENT USE OF INSULIN (HCC): Primary | ICD-10-CM

## 2019-09-19 ENCOUNTER — HOSPITAL ENCOUNTER (OUTPATIENT)
Dept: NON INVASIVE DIAGNOSTICS | Age: 49
Discharge: HOME | End: 2019-09-19
Payer: MEDICARE

## 2019-09-19 ENCOUNTER — CARE COORDINATION (OUTPATIENT)
Dept: CARE COORDINATION | Age: 49
End: 2019-09-19

## 2019-09-19 ENCOUNTER — TELEPHONE (OUTPATIENT)
Dept: PHARMACY | Age: 49
End: 2019-09-19

## 2019-09-19 DIAGNOSIS — R07.9 CHEST PAIN, UNSPECIFIED TYPE: ICD-10-CM

## 2019-09-19 DIAGNOSIS — J90 PLEURAL EFFUSION, BILATERAL: ICD-10-CM

## 2019-09-19 LAB
LV EF: 60 %
LVEF MODALITY: NORMAL

## 2019-09-19 PROCEDURE — 93306 TTE W/DOPPLER COMPLETE: CPT

## 2019-09-19 NOTE — TELEPHONE ENCOUNTER
Glenys Delarosa reached out to me to see if I could help [facility] on the cost of his [place], I sent Dorann Alpers her portion of the application. Once I get all the information back I will send it into the patient assistance program. I also faxed in his application for his insulins to the  and will check next week if he has been approved or not.          Bishop Bai Medication Coordinator   [facility] Patient Assistance Program   V) [phone] (W) [phone]

## 2019-09-20 ENCOUNTER — TELEPHONE (OUTPATIENT)
Dept: INTERNAL MEDICINE CLINIC | Age: 49
End: 2019-09-20

## 2019-09-20 NOTE — TELEPHONE ENCOUNTER
----- Message from Merlene Estrada MD sent at 9/20/2019 10:07 AM EDT -----  Let him know EF still normal - will discuss more at visit. Let me know if SOB or increased edema.

## 2019-09-25 ENCOUNTER — TELEPHONE (OUTPATIENT)
Dept: PHARMACY | Age: 49
End: 2019-09-25

## 2019-09-30 ENCOUNTER — OFFICE VISIT (OUTPATIENT)
Dept: INTERNAL MEDICINE CLINIC | Age: 49
End: 2019-09-30
Payer: MEDICARE

## 2019-09-30 VITALS
BODY MASS INDEX: 32.28 KG/M2 | HEIGHT: 74 IN | HEART RATE: 90 BPM | WEIGHT: 251.5 LBS | SYSTOLIC BLOOD PRESSURE: 118 MMHG | DIASTOLIC BLOOD PRESSURE: 76 MMHG

## 2019-09-30 DIAGNOSIS — J90 PLEURAL EFFUSION: ICD-10-CM

## 2019-09-30 DIAGNOSIS — E55.9 VITAMIN D DEFICIENCY: ICD-10-CM

## 2019-09-30 DIAGNOSIS — Z79.4 TYPE 2 DIABETES MELLITUS WITH HYPERGLYCEMIA, WITH LONG-TERM CURRENT USE OF INSULIN (HCC): Primary | ICD-10-CM

## 2019-09-30 DIAGNOSIS — E53.8 VITAMIN B 12 DEFICIENCY: ICD-10-CM

## 2019-09-30 DIAGNOSIS — E04.1 THYROID NODULE: ICD-10-CM

## 2019-09-30 DIAGNOSIS — E78.2 MIXED HYPERLIPIDEMIA: ICD-10-CM

## 2019-09-30 DIAGNOSIS — E11.65 TYPE 2 DIABETES MELLITUS WITH HYPERGLYCEMIA, WITH LONG-TERM CURRENT USE OF INSULIN (HCC): Primary | ICD-10-CM

## 2019-09-30 PROCEDURE — G8599 NO ASA/ANTIPLAT THER USE RNG: HCPCS | Performed by: INTERNAL MEDICINE

## 2019-09-30 PROCEDURE — 1036F TOBACCO NON-USER: CPT | Performed by: INTERNAL MEDICINE

## 2019-09-30 PROCEDURE — G8427 DOCREV CUR MEDS BY ELIG CLIN: HCPCS | Performed by: INTERNAL MEDICINE

## 2019-09-30 PROCEDURE — G8417 CALC BMI ABV UP PARAM F/U: HCPCS | Performed by: INTERNAL MEDICINE

## 2019-09-30 PROCEDURE — 3046F HEMOGLOBIN A1C LEVEL >9.0%: CPT | Performed by: INTERNAL MEDICINE

## 2019-09-30 PROCEDURE — 99213 OFFICE O/P EST LOW 20 MIN: CPT | Performed by: INTERNAL MEDICINE

## 2019-09-30 PROCEDURE — 2022F DILAT RTA XM EVC RTNOPTHY: CPT | Performed by: INTERNAL MEDICINE

## 2019-09-30 RX ORDER — METRONIDAZOLE 500 MG/1
500 TABLET ORAL 2 TIMES DAILY
Status: ON HOLD | COMMUNITY
End: 2019-10-22

## 2019-09-30 RX ORDER — SULFAMETHOXAZOLE AND TRIMETHOPRIM 800; 160 MG/1; MG/1
1 TABLET ORAL 2 TIMES DAILY
Status: ON HOLD | COMMUNITY
End: 2019-10-22

## 2019-10-01 ENCOUNTER — TELEPHONE (OUTPATIENT)
Dept: PHARMACY | Age: 49
End: 2019-10-01

## 2019-10-02 ENCOUNTER — CARE COORDINATION (OUTPATIENT)
Dept: CARE COORDINATION | Age: 49
End: 2019-10-02

## 2019-10-18 ENCOUNTER — CARE COORDINATION (OUTPATIENT)
Dept: CARE COORDINATION | Age: 49
End: 2019-10-18

## 2019-10-22 ENCOUNTER — HOSPITAL ENCOUNTER (OUTPATIENT)
Age: 49
Setting detail: OUTPATIENT SURGERY
Discharge: HOME | End: 2019-10-22
Attending: INTERNAL MEDICINE | Admitting: INTERNAL MEDICINE
Payer: MEDICARE

## 2019-10-22 ENCOUNTER — ANESTHESIA (OUTPATIENT)
Dept: ENDOSCOPY | Age: 49
End: 2019-10-22
Payer: MEDICARE

## 2019-10-22 ENCOUNTER — ANESTHESIA EVENT (OUTPATIENT)
Dept: ENDOSCOPY | Age: 49
End: 2019-10-22
Payer: MEDICARE

## 2019-10-22 VITALS
SYSTOLIC BLOOD PRESSURE: 129 MMHG | HEIGHT: 74 IN | RESPIRATION RATE: 16 BRPM | HEART RATE: 73 BPM | BODY MASS INDEX: 32.14 KG/M2 | TEMPERATURE: 98 F | WEIGHT: 250.4 LBS | OXYGEN SATURATION: 95 % | DIASTOLIC BLOOD PRESSURE: 88 MMHG

## 2019-10-22 VITALS
DIASTOLIC BLOOD PRESSURE: 97 MMHG | OXYGEN SATURATION: 94 % | RESPIRATION RATE: 9 BRPM | SYSTOLIC BLOOD PRESSURE: 151 MMHG

## 2019-10-22 LAB — GLUCOSE BLD-MCNC: 118 MG/DL (ref 70–108)

## 2019-10-22 PROCEDURE — 3700000000 HC ANESTHESIA ATTENDED CARE: Performed by: INTERNAL MEDICINE

## 2019-10-22 PROCEDURE — 6360000002 HC RX W HCPCS: Performed by: NURSE ANESTHETIST, CERTIFIED REGISTERED

## 2019-10-22 PROCEDURE — 3609017700 HC EGD DILATION GASTRIC/DUODENAL STRICTURE: Performed by: INTERNAL MEDICINE

## 2019-10-22 PROCEDURE — 2709999900 HC NON-CHARGEABLE SUPPLY: Performed by: INTERNAL MEDICINE

## 2019-10-22 PROCEDURE — 3609012400 HC EGD TRANSORAL BIOPSY SINGLE/MULTIPLE: Performed by: INTERNAL MEDICINE

## 2019-10-22 PROCEDURE — 82948 REAGENT STRIP/BLOOD GLUCOSE: CPT

## 2019-10-22 PROCEDURE — 7100000000 HC PACU RECOVERY - FIRST 15 MIN: Performed by: INTERNAL MEDICINE

## 2019-10-22 PROCEDURE — 88305 TISSUE EXAM BY PATHOLOGIST: CPT

## 2019-10-22 PROCEDURE — 3700000001 HC ADD 15 MINUTES (ANESTHESIA): Performed by: INTERNAL MEDICINE

## 2019-10-22 PROCEDURE — 7100000001 HC PACU RECOVERY - ADDTL 15 MIN: Performed by: INTERNAL MEDICINE

## 2019-10-22 PROCEDURE — 2580000003 HC RX 258: Performed by: INTERNAL MEDICINE

## 2019-10-22 RX ORDER — PROPOFOL 10 MG/ML
INJECTION, EMULSION INTRAVENOUS PRN
Status: DISCONTINUED | OUTPATIENT
Start: 2019-10-22 | End: 2019-10-22 | Stop reason: SDUPTHER

## 2019-10-22 RX ORDER — SODIUM CHLORIDE 450 MG/100ML
INJECTION, SOLUTION INTRAVENOUS CONTINUOUS
Status: DISCONTINUED | OUTPATIENT
Start: 2019-10-22 | End: 2019-10-22 | Stop reason: HOSPADM

## 2019-10-22 RX ADMIN — PROPOFOL 100 MG: 10 INJECTION, EMULSION INTRAVENOUS at 13:16

## 2019-10-22 RX ADMIN — PROPOFOL 100 MG: 10 INJECTION, EMULSION INTRAVENOUS at 13:13

## 2019-10-22 RX ADMIN — SODIUM CHLORIDE: 4.5 INJECTION, SOLUTION INTRAVENOUS at 12:13

## 2019-10-22 ASSESSMENT — ENCOUNTER SYMPTOMS
SHORTNESS OF BREATH: 1
DYSPNEA ACTIVITY LEVEL: AFTER AMBULATING 1 FLIGHT OF STAIRS

## 2019-10-22 ASSESSMENT — PAIN DESCRIPTION - DESCRIPTORS: DESCRIPTORS: ACHING

## 2019-10-22 ASSESSMENT — PAIN DESCRIPTION - PAIN TYPE: TYPE: CHRONIC PAIN

## 2019-10-22 ASSESSMENT — PAIN - FUNCTIONAL ASSESSMENT: PAIN_FUNCTIONAL_ASSESSMENT: 0-10

## 2019-10-22 ASSESSMENT — PAIN SCALES - GENERAL: PAINLEVEL_OUTOF10: 5

## 2019-10-22 ASSESSMENT — PAIN DESCRIPTION - LOCATION: LOCATION: ABDOMEN

## 2019-10-28 ENCOUNTER — OFFICE VISIT (OUTPATIENT)
Dept: INTERNAL MEDICINE CLINIC | Age: 49
End: 2019-10-28
Payer: MEDICARE

## 2019-10-28 VITALS
SYSTOLIC BLOOD PRESSURE: 130 MMHG | DIASTOLIC BLOOD PRESSURE: 84 MMHG | BODY MASS INDEX: 33.21 KG/M2 | HEART RATE: 80 BPM | WEIGHT: 258.8 LBS | HEIGHT: 74 IN

## 2019-10-28 DIAGNOSIS — K40.90 UNILATERAL INGUINAL HERNIA WITHOUT OBSTRUCTION OR GANGRENE, RECURRENCE NOT SPECIFIED: ICD-10-CM

## 2019-10-28 DIAGNOSIS — R82.998 ELEVATED URINARY FREE CORTISOL LEVEL: ICD-10-CM

## 2019-10-28 DIAGNOSIS — E04.1 THYROID NODULE: ICD-10-CM

## 2019-10-28 DIAGNOSIS — Z79.4 TYPE 2 DIABETES MELLITUS WITH HYPERGLYCEMIA, WITH LONG-TERM CURRENT USE OF INSULIN (HCC): Primary | ICD-10-CM

## 2019-10-28 DIAGNOSIS — E11.65 TYPE 2 DIABETES MELLITUS WITH HYPERGLYCEMIA, WITH LONG-TERM CURRENT USE OF INSULIN (HCC): Primary | ICD-10-CM

## 2019-10-28 DIAGNOSIS — G62.9 NEUROPATHY: ICD-10-CM

## 2019-10-28 PROCEDURE — 99213 OFFICE O/P EST LOW 20 MIN: CPT | Performed by: INTERNAL MEDICINE

## 2019-10-28 PROCEDURE — 3046F HEMOGLOBIN A1C LEVEL >9.0%: CPT | Performed by: INTERNAL MEDICINE

## 2019-10-28 PROCEDURE — G8427 DOCREV CUR MEDS BY ELIG CLIN: HCPCS | Performed by: INTERNAL MEDICINE

## 2019-10-28 PROCEDURE — 2022F DILAT RTA XM EVC RTNOPTHY: CPT | Performed by: INTERNAL MEDICINE

## 2019-10-28 PROCEDURE — G8484 FLU IMMUNIZE NO ADMIN: HCPCS | Performed by: INTERNAL MEDICINE

## 2019-10-28 PROCEDURE — 1036F TOBACCO NON-USER: CPT | Performed by: INTERNAL MEDICINE

## 2019-10-28 PROCEDURE — G8599 NO ASA/ANTIPLAT THER USE RNG: HCPCS | Performed by: INTERNAL MEDICINE

## 2019-10-28 PROCEDURE — G8417 CALC BMI ABV UP PARAM F/U: HCPCS | Performed by: INTERNAL MEDICINE

## 2019-11-01 ENCOUNTER — CARE COORDINATION (OUTPATIENT)
Dept: CARE COORDINATION | Age: 49
End: 2019-11-01

## 2019-11-07 ENCOUNTER — TELEPHONE (OUTPATIENT)
Dept: INTERNAL MEDICINE CLINIC | Age: 49
End: 2019-11-07

## 2019-11-18 ENCOUNTER — CARE COORDINATION (OUTPATIENT)
Dept: CARE COORDINATION | Age: 49
End: 2019-11-18

## 2019-11-25 DIAGNOSIS — Z79.4 TYPE 2 DIABETES MELLITUS WITH DIABETIC NEUROPATHY, WITH LONG-TERM CURRENT USE OF INSULIN (HCC): ICD-10-CM

## 2019-11-25 DIAGNOSIS — E11.40 TYPE 2 DIABETES MELLITUS WITH DIABETIC NEUROPATHY, WITH LONG-TERM CURRENT USE OF INSULIN (HCC): ICD-10-CM

## 2019-11-26 ENCOUNTER — CARE COORDINATION (OUTPATIENT)
Dept: CARE COORDINATION | Age: 49
End: 2019-11-26

## 2019-11-26 DIAGNOSIS — E11.40 TYPE 2 DIABETES MELLITUS WITH DIABETIC NEUROPATHY, WITH LONG-TERM CURRENT USE OF INSULIN (HCC): ICD-10-CM

## 2019-11-26 DIAGNOSIS — Z79.4 TYPE 2 DIABETES MELLITUS WITH DIABETIC NEUROPATHY, WITH LONG-TERM CURRENT USE OF INSULIN (HCC): ICD-10-CM

## 2019-11-26 RX ORDER — AZITHROMYCIN 250 MG/1
TABLET, FILM COATED ORAL
Qty: 1 PACKET | Refills: 0 | Status: SHIPPED | OUTPATIENT
Start: 2019-11-26 | End: 2019-12-17 | Stop reason: ALTCHOICE

## 2019-11-26 RX ORDER — AMOXICILLIN AND CLAVULANATE POTASSIUM 875; 125 MG/1; MG/1
1 TABLET, FILM COATED ORAL 2 TIMES DAILY
Qty: 20 TABLET | Refills: 0 | Status: SHIPPED | OUTPATIENT
Start: 2019-11-26 | End: 2019-12-06

## 2019-12-03 ENCOUNTER — TELEPHONE (OUTPATIENT)
Dept: INTERNAL MEDICINE CLINIC | Age: 49
End: 2019-12-03

## 2019-12-05 ENCOUNTER — CARE COORDINATION (OUTPATIENT)
Dept: CARE COORDINATION | Age: 49
End: 2019-12-05

## 2019-12-16 ENCOUNTER — CARE COORDINATION (OUTPATIENT)
Dept: CARE COORDINATION | Age: 49
End: 2019-12-16

## 2019-12-17 ENCOUNTER — OFFICE VISIT (OUTPATIENT)
Dept: INTERNAL MEDICINE CLINIC | Age: 49
End: 2019-12-17
Payer: MEDICARE

## 2019-12-17 VITALS
BODY MASS INDEX: 33.5 KG/M2 | HEIGHT: 74 IN | DIASTOLIC BLOOD PRESSURE: 90 MMHG | HEART RATE: 86 BPM | WEIGHT: 261 LBS | SYSTOLIC BLOOD PRESSURE: 138 MMHG

## 2019-12-17 LAB — HBA1C MFR BLD: 6.7 % (ref 4.3–5.7)

## 2019-12-17 PROCEDURE — 2022F DILAT RTA XM EVC RTNOPTHY: CPT | Performed by: INTERNAL MEDICINE

## 2019-12-17 PROCEDURE — G8599 NO ASA/ANTIPLAT THER USE RNG: HCPCS | Performed by: INTERNAL MEDICINE

## 2019-12-17 PROCEDURE — 99214 OFFICE O/P EST MOD 30 MIN: CPT | Performed by: INTERNAL MEDICINE

## 2019-12-17 PROCEDURE — 3044F HG A1C LEVEL LT 7.0%: CPT | Performed by: INTERNAL MEDICINE

## 2019-12-17 PROCEDURE — 1036F TOBACCO NON-USER: CPT | Performed by: INTERNAL MEDICINE

## 2019-12-17 PROCEDURE — G8428 CUR MEDS NOT DOCUMENT: HCPCS | Performed by: INTERNAL MEDICINE

## 2019-12-17 PROCEDURE — G8417 CALC BMI ABV UP PARAM F/U: HCPCS | Performed by: INTERNAL MEDICINE

## 2019-12-17 PROCEDURE — G8484 FLU IMMUNIZE NO ADMIN: HCPCS | Performed by: INTERNAL MEDICINE

## 2019-12-17 RX ORDER — FENOFIBRATE 145 MG/1
145 TABLET, COATED ORAL DAILY
Qty: 90 TABLET | Refills: 1 | Status: SHIPPED | OUTPATIENT
Start: 2019-12-17 | End: 2020-08-17 | Stop reason: SDUPTHER

## 2019-12-17 RX ORDER — PANTOPRAZOLE SODIUM 40 MG/1
40 TABLET, DELAYED RELEASE ORAL 2 TIMES DAILY
Qty: 180 TABLET | Refills: 1 | Status: CANCELLED | OUTPATIENT
Start: 2019-12-17

## 2019-12-17 RX ORDER — ERGOCALCIFEROL 1.25 MG/1
50000 CAPSULE ORAL WEEKLY
Qty: 12 CAPSULE | Refills: 1 | Status: SHIPPED | OUTPATIENT
Start: 2019-12-17 | End: 2020-08-17 | Stop reason: SDUPTHER

## 2019-12-17 RX ORDER — ROPINIROLE 1 MG/1
TABLET, FILM COATED ORAL
Qty: 270 TABLET | Refills: 1 | Status: SHIPPED | OUTPATIENT
Start: 2019-12-17 | End: 2020-05-22 | Stop reason: SDUPTHER

## 2019-12-17 RX ORDER — ATORVASTATIN CALCIUM 10 MG/1
TABLET, FILM COATED ORAL
Qty: 90 TABLET | Refills: 1 | Status: SHIPPED | OUTPATIENT
Start: 2019-12-17 | End: 2020-08-17 | Stop reason: SDUPTHER

## 2019-12-17 RX ORDER — GABAPENTIN 600 MG/1
600 TABLET ORAL 4 TIMES DAILY
Qty: 360 TABLET | Refills: 1 | Status: SHIPPED | OUTPATIENT
Start: 2019-12-17 | End: 2020-08-17

## 2019-12-17 RX ORDER — GABAPENTIN 600 MG/1
600 TABLET ORAL 4 TIMES DAILY
COMMUNITY
End: 2019-12-17

## 2019-12-17 RX ORDER — METOPROLOL TARTRATE 50 MG/1
75 TABLET, FILM COATED ORAL 2 TIMES DAILY
Qty: 270 TABLET | Refills: 1 | Status: SHIPPED | OUTPATIENT
Start: 2019-12-17 | End: 2020-08-17 | Stop reason: SDUPTHER

## 2019-12-17 NOTE — PROGRESS NOTES
1970    Chief Complaint   Patient presents with    Diabetes      month     Other     c/o chest discomfort when drinking , does not occur any other time        Pt is a 52 y.o. male who presents for 4 weeks follow up visit. Youngest daughter is living with them now with her 2 month old. Oldest daughter is out of [address] [place] [address] and back in prison. Donna Pickering was in hospital, now home. Patient is now working 7pm- whenever. Depression - mood better - no SI - appt with psychiatry in 1/2020    DM - HgA1c 6.7 12/2019 but FSBS variable. Currently on Basaglar 80 Units BID and Humalog- 30 plus SSI group 3 - will keep this the same. He needs to set up eye exam.  No foot lesions, positive neuropathy - severe - tried Neurontin, Lyrica, folic AAOV/N82. No autonomic dysfunction. LDL controlled 98 11/2018. Microalbumin and Cr normal 12/2018. Esophageal pain with liquids/GERD - ?contractility issues -EGD done - found ulcer and bx mild chronic reflux esophagitis per patient - on Protonix 40 mg BID but is not a change from baseline dose. He called to discuss with Dr. Elizabeth Diez but he is out of town. Colon set up for 11/26/19. Diarrhea comes and goes. He cancelled colonoscopy - need new appt. Now on Nexium BID and esophageal pain better - reminded him to continue following with GI. Hyperlipidemia - LDL 98 and Trig 359 11/2018. Continue Lipitor/Tricor. Due for lipid panel now. Leg edema comes and goes - none today. RLS - stable. Continue Requip. Vitamin D deficiency - on 6000 IU daily and level 20 4/2018. Increased to 4000 IU BID. Check level now. Stil need to get cortisol levels for me, stool samples for Dr. Elizabeth Diez. He states he will try to get this done. Discussed at previous visit:  Very poor sleep with neuropathy on 600 mg QID - involves mainly legs but also has symptoms in head. We have tried Lyrica (too expensive), Metanx. Offered OTC creams, CBD oil OTC and he wasn't interested. neg for obstructive stone in hospital.     Seizures worse the last one week 4x - Dr. Theron Dwyer aware - no change in medication, on max on Vimpat and Keppra. Life extremely stressful right now. Marely Singletary he has custody of - has seizures -MRI pending. Father in law can't walk and mother in law with dementia. They aren't living with them yet but likely will in the near future. Oldest daughter is in [facility] rehab. Need to work on mental health issues. He still follows [facility] issues - on Cymbalta, Depakote. The major issue is that he is putting his health issues on hold to support everyone else. He fell down the entire stairwell. He started having escalating back pain and went to ER 6/22. He was diagnosed with L5-S1 interarticularis fracture without dislocation in ER 6/22/19 - given Norco, Flexeril, and lidocaine patches - unable to get patch due to insurance - told him to buy OTC. He followed up with Dr. Nagi Salgado. No surgery necessary per patient. Bilateral flank pain but not midline pain - has right L5 pars defect/fracture. He saw Dr. Nagi Salgado who did not believe flank pain related to fracture. He had a CT abd and pelvis with IV contrast 6/22/19 - noted bilateral punctate, nonobstructing renal calculi. Flank pain is worsening - will check renal u/s for hydronephrosis and refer to Urology. Head numbness -top of head, lack of sensation - not pins and needles- intermittent, happens every day, several times a day. Initially was a precursor to seizure but not having seizure now after it. Dr. Theron Dwyer states not related to seizures -was to see him in July. Initially thought Ativan helped but ran out and now not sure it helps. He did MRI 4/9/19 - no acute findings. He then saw Dr. Nagi Salgado and states everything stable. He has had MRI cervical/thoracic spine 2/2019 - mild stenosis. Don't know additional testing that might help - suggested seeing general neurologist to follow up. episodic chest pain with SOB, diaphoresis, tachycardia that lasts 2-20 minutes up to 12 x a day. When it happens it hurts to take deep breath - but not present when no chest pain. He had an episode during my last visit - no pain to palpation of chest.  Positive diaphoresis, tachycardia, but also at time had a sugar of 72. Given glucose tab and crackers. He was given a stress test in ER which was unremarkable. For the tachycardia - at last visit, increased metoprolol and added prednisone for possible pleurisy and abnormal CT. Chest pain still an issue - set up ECHO and visit with Dr. Tacos Sarmiento and see if he thinks could be due to bridging of LAD. Reviewed CT results - ground glass opacities resolved after steroids. Past Medical History:   Diagnosis Date    Abnormal thyroid biopsy     Acid reflux     Anemia     previously seen by Dr. Claudeen North (due to enlarged spleen)    Bipolar disorder ([facility]     Blood clot in vein 4/7/16    [facility]) 04/2019    thyroid    Chest pain     previously seeing [facility] cardiologist, now seeing Dr. Tacos Sarmiento (LAD bridging on cath 4/2016)    Chronic back pain     Chronic bronchitis ([facility]     Dr. Suraj Healy 10/2012 - no longer following with him    Colon polyp 08/30/2016    Depression     Dr. Jose Irizarry DVT (deep venous thrombosis) ([facility] [id number]    not on Coumadin due to unable to regulate - Dr. Daily Russo - no etiology found per patient    Esophageal abnormality     nodule     Fatty liver disease, nonalcoholic     U/S 29/0970 [facility]    Furuncle     legs    History of Doppler ultrasound 5-29-11    No hemodynamically significant carotid stenosis is identified. A thyroid nodule on each side. Dedicated ultrasound of thyroid gland is suggested to further evaluate if clinically indicated.      Hx of blood clots     Hyperlipidemia     severely elevated triglycerides    Hypertension     diastolic    Intracranial arachnoid cyst 11/2012    Dr. Aleks Orellana drained, complicated Take 1 tablet by mouth 2 times daily 180 tablet 1    insulin aspart (NOVOLOG FLEXPEN) 100 UNIT/ML injection pen Inject 30 Units into the skin 3 times daily (before meals) Plus sliding scale 4:50 > 150 mg/dl 30 pen 1    Melatonin 10 MG TABS Take 30 mg by mouth nightly       DULoxetine (CYMBALTA) 60 MG extended release capsule Take 120 mg by mouth daily       lacosamide (VIMPAT) 100 MG TABS tablet Take 300 mg by mouth 2 times daily.  Cyanocobalamin (VITAMIN B-12) 2500 MCG SUBL Place 2,500 mcg under the tongue daily      sildenafil (VIAGRA) 100 MG tablet Take 1 tablet by mouth as needed for Erectile Dysfunction 45 tablet 3    divalproex (DEPAKOTE) 500 MG DR tablet Take 500 mg by mouth 2 times daily       Blood Glucose Monitoring Suppl (ONE TOUCH BASIC SYSTEM) W/DEVICE KIT Use to test blood glucose level 4 times per day. Diagnosis: E11.40 1 kit 0    clindamycin (CLEOCIN T) 1 % external solution Apply topically 2 times daily. 30 mL 5    levETIRAcetam (KEPPRA) 500 MG tablet Take 2,000 mg by mouth 2 times daily       LORazepam (ATIVAN) 0.5 MG tablet Take 0.5 mg by mouth 2 times daily as needed (seizures)       zonisamide (ZONEGRAN) 100 MG capsule Take 500 mg by mouth nightly       amoxicillin-clavulanate (AUGMENTIN) 875-125 MG per tablet Take 1 tablet by mouth 2 times daily for 10 days 20 tablet 0     No current facility-administered medications for this visit.         Allergies   Allergen Reactions    Ciprofloxacin-Ciproflox Hcl Er Other (See Comments)     Elevated creatinine    Heparin      Monitor for thrombocytopenia    Metformin Nausea Only     Abdominal pain, diarrhea    Niacin And Related Other (See Comments)     Burning sensation, severe flushing    Tramadol Hcl      Contraindication to seizure medications    Ultram [Tramadol]      Contraindication to seizure medications    Warfarin      Very difficult to regulate in past       Social History     Socioeconomic History    Marital status: surgeries  Respiratory ROS: no shortness of breath, wheezing, mild cough from grandson  Cardiovascular ROS: chronic intermittent chest pain - thought to be due to anxiety (GI and cardiology aware and completed work-up in past) - patient states current chest pain is not different, no dyspnea on exertion, tolerates vacuuming  Gastrointestinal ROS: positive intermittent chronic abdominal pain - worse now - pain with eating, no change in bowel habits, or black or bloody stools, positive nausea, intermittent vomiting  Genito-Urinary ROS: no dysuria, trouble voiding, or hematuria - h/o frequency, urgency  Neurological ROS: negative for - headaches, or weakness, positive numbness/pain in feet/legs/head, and seizures  Dermatological ROS: negative for - rash or skin lesion changes, except chronic rash on legs    Blood pressure (!) 138/90, pulse 86, height 6' 2\" (1.88 m), weight 261 lb (118.4 kg). Physical Examination: General appearance -alert, well appearing, and in no distress  Mental status - alert, oriented to person, place, and time  Neck - supple, no significant adenopathy  Chest - clear to auscultation, positive wheezes, no rales or rhonchi, symmetric air entry  Heart - normal rate, regular rhythm, normal S1, S2, no murmurs, rubs, clicks or gallops  Abdomen -soft, diffuse tenderness to palpation, nondistended  Extremities - peripheral pulses normal, no pedal edema, no clubbing or cyanosis, right inguinal hernia small  Neurological - alert, oriented  Skin - warm and dry    Foot exam 11/6/19: no foot lesions, sensation decreased to level of mid shin with monofilament testing bilaterally. +2 DP and PT pulses bilaterally. Diagnostic Data:  Reviewed labs from 9/2019 with patient. Assessment and Plan:     Diagnosis Orders   1. Type 2 diabetes mellitus with diabetic neuropathy, with long-term current use of insulin ([facility])  POCT glycosylated hemoglobin (Hb A1C)    49988 - Collection Capillary Blood Specimen   2. Gastroesophageal reflux disease without esophagitis     3. Neuropathy  gabapentin (NEURONTIN) 600 MG tablet   4. Mixed hyperlipidemia  fenofibrate (TRICOR) 145 MG tablet    atorvastatin (LIPITOR) 10 MG tablet   5. Restless legs syndrome (RLS)  rOPINIRole (REQUIP) 1 MG tablet   6. Vitamin D deficiency  vitamin D (ERGOCALCIFEROL) 1.25 MG (49169 UT) CAPS capsule   7. Sinus tachycardia  metoprolol tartrate (LOPRESSOR) 50 MG tablet     DM- HgA1c at goal - continue Basaglar 80 Unit Q12 hours. Continue Novalog as above. Microalbumin due now. GERD/Dysphagia - stable but still with chronic abdominal pain/trouble swallowing liquids. Continue seeing Dr. Julio Cesar Estrada. Continue PPI. EGD done. Neuropathy - severe, continue Neurontin. We have tried Lyrica and too expensive - also not helpful. We have tried folate, B12 - Metanx and no help. Previously sent to Dr. Pedro Luis Salmeron for alternative options. I am at a loss as to how to improve this. Patient asked for pain medication but I don't feel that this is going to help nerve pain. Also potential for addiction. Discussed CBD oil/OTC creams but he was not interested. There is nothing on foot exam to suggest pain source other than neuropathy. Dr. Tevin Fabian has been evaluating back with fracture - right L5 pars defect (CT lumbar spine 6/22/19, lumbar x-ray 8/1/19), but he had this neuropathy prior to fracture. Hyperlipidemia - LDL at goal 11/2018 - refill fenofibrate, Lipitor. Due for lipid panel now. RLS - stable. Continue Requip. Vitamin D deficiency - on 6000 IU daily and level 20 4/2018. Increased to 4000 IU BID. Check level now. Sinus tachycardia - controlled on metoprolol. Follow up visit in 12 weeks. Jose Antonio Mccabe MD    [facility]  PHYSICIANS [facility]. [place] [address]  [address] [place] [address]  Dept: [phone]  Dept Fax: [phone] : [phone]

## 2019-12-17 NOTE — PATIENT INSTRUCTIONS
Make appt with GI - colonoscopy. Tell them about your esophagus pain with swallowing liquids. Let me know if what Carafate.

## 2019-12-18 ENCOUNTER — CARE COORDINATION (OUTPATIENT)
Dept: CARE COORDINATION | Age: 49
End: 2019-12-18

## 2019-12-26 ENCOUNTER — NURSE ONLY (OUTPATIENT)
Dept: LAB | Age: 49
End: 2019-12-26

## 2019-12-26 ENCOUNTER — TELEPHONE (OUTPATIENT)
Dept: INTERNAL MEDICINE CLINIC | Age: 49
End: 2019-12-26

## 2019-12-26 DIAGNOSIS — E53.8 VITAMIN B 12 DEFICIENCY: Primary | ICD-10-CM

## 2019-12-26 DIAGNOSIS — E53.8 VITAMIN B 12 DEFICIENCY: ICD-10-CM

## 2019-12-26 DIAGNOSIS — Z79.4 TYPE 2 DIABETES MELLITUS WITH HYPERGLYCEMIA, WITH LONG-TERM CURRENT USE OF INSULIN (HCC): ICD-10-CM

## 2019-12-26 DIAGNOSIS — E55.9 VITAMIN D DEFICIENCY: ICD-10-CM

## 2019-12-26 DIAGNOSIS — R82.998 ELEVATED URINARY FREE CORTISOL LEVEL: ICD-10-CM

## 2019-12-26 DIAGNOSIS — E78.2 MIXED HYPERLIPIDEMIA: ICD-10-CM

## 2019-12-26 DIAGNOSIS — R68.89 HEAT INTOLERANCE: ICD-10-CM

## 2019-12-26 DIAGNOSIS — R10.9 ABDOMINAL PAIN, UNSPECIFIED ABDOMINAL LOCATION: ICD-10-CM

## 2019-12-26 DIAGNOSIS — R10.9 BILATERAL FLANK PAIN: ICD-10-CM

## 2019-12-26 DIAGNOSIS — E11.65 TYPE 2 DIABETES MELLITUS WITH HYPERGLYCEMIA, WITH LONG-TERM CURRENT USE OF INSULIN (HCC): ICD-10-CM

## 2019-12-26 LAB
BILIRUBIN URINE: NEGATIVE
BLOOD, URINE: NEGATIVE
CHARACTER, URINE: CLEAR
CHOLESTEROL, TOTAL: 133 MG/DL (ref 100–199)
COLOR: YELLOW
CREATININE, URINE: 150 MG/DL
GLUCOSE URINE: >= 1000 MG/DL
HDLC SERPL-MCNC: 29 MG/DL
KETONES, URINE: NEGATIVE
LDL CHOLESTEROL CALCULATED: 37 MG/DL
LEUKOCYTE ESTERASE, URINE: NEGATIVE
MICROALBUMIN UR-MCNC: 1.94 MG/DL
MICROALBUMIN/CREAT UR-RTO: 13 MG/G (ref 0–30)
NITRITE, URINE: NEGATIVE
PH UA: 6.5 (ref 5–9)
PROTEIN UA: NEGATIVE
SPECIFIC GRAVITY, URINE: > 1.03 (ref 1–1.03)
TRIGL SERPL-MCNC: 334 MG/DL (ref 0–199)
TSH SERPL DL<=0.05 MIU/L-ACNC: 1.08 UIU/ML (ref 0.4–4.2)
UROBILINOGEN, URINE: 1 EU/DL (ref 0–1)
VITAMIN B-12: 215 PG/ML (ref 211–911)
VITAMIN D 25-HYDROXY: 9 NG/ML (ref 30–100)

## 2020-01-08 ENCOUNTER — CARE COORDINATION (OUTPATIENT)
Dept: CARE COORDINATION | Age: 50
End: 2020-01-08

## 2020-01-13 ENCOUNTER — OFFICE VISIT (OUTPATIENT)
Dept: INTERNAL MEDICINE CLINIC | Age: 50
End: 2020-01-13
Payer: MEDICARE

## 2020-01-13 ENCOUNTER — CARE COORDINATION (OUTPATIENT)
Dept: CARE COORDINATION | Age: 50
End: 2020-01-13

## 2020-01-13 VITALS
BODY MASS INDEX: 32.78 KG/M2 | DIASTOLIC BLOOD PRESSURE: 86 MMHG | HEART RATE: 88 BPM | TEMPERATURE: 98.8 F | WEIGHT: 255.4 LBS | HEIGHT: 74 IN | SYSTOLIC BLOOD PRESSURE: 132 MMHG

## 2020-01-13 PROCEDURE — G8417 CALC BMI ABV UP PARAM F/U: HCPCS | Performed by: INTERNAL MEDICINE

## 2020-01-13 PROCEDURE — 99213 OFFICE O/P EST LOW 20 MIN: CPT | Performed by: INTERNAL MEDICINE

## 2020-01-13 PROCEDURE — G8484 FLU IMMUNIZE NO ADMIN: HCPCS | Performed by: INTERNAL MEDICINE

## 2020-01-13 PROCEDURE — G8427 DOCREV CUR MEDS BY ELIG CLIN: HCPCS | Performed by: INTERNAL MEDICINE

## 2020-01-13 PROCEDURE — 1036F TOBACCO NON-USER: CPT | Performed by: INTERNAL MEDICINE

## 2020-01-13 RX ORDER — AMOXICILLIN AND CLAVULANATE POTASSIUM 875; 125 MG/1; MG/1
1 TABLET, FILM COATED ORAL 2 TIMES DAILY
Qty: 20 TABLET | Refills: 0 | Status: SHIPPED | OUTPATIENT
Start: 2020-01-13 | End: 2020-01-23

## 2020-01-13 NOTE — PROGRESS NOTES
1970    Chief Complaint   Patient presents with    Cough     symptoms for past week / BS running high     Chest Congestion       Pt is a 52 y.o. male who presents for urgent visit. Whole family sick the last month. He has been ill x a week, fever to 102 yesterday and today. Green sputum with cough. Head and chest cold. Will give Augmentin to get both. B12 low -he had been on injection but expensive, now on oral the last one year and level is low normal again. Need to know dose he is taking and will check Intrinsic factor. Vitamin D deficiency - Changed vitamin D OTC to 50,000 IU last week as level was 9. No esophageal pain with liquids. FSBS only high with cold, otherwise states it is back under control. Reviewed labs with him. Past Medical History:   Diagnosis Date    Abnormal thyroid biopsy     Acid reflux     Anemia     previously seen by Dr. Josep Villa (due to enlarged spleen)    Bipolar disorder ([facility]     Blood clot in vein 4/7/16    [facility]) 04/2019    thyroid    Chest pain     previously seeing [facility] cardiologist, now seeing Dr. Mirella Patel (LAD bridging on cath 4/2016)    Chronic back pain     Chronic bronchitis ([facility]     Dr. Altagracia Reed 10/2012 - no longer following with him    Colon polyp 08/30/2016    Depression     Dr. Sugey Kim DVT (deep venous thrombosis) ([facility] [id number]    not on Coumadin due to unable to regulate - Dr. Peter Westbrook - no etiology found per patient    Esophageal abnormality     nodule     Fatty liver disease, nonalcoholic     U/S 54/0410 [facility]    Furuncle     legs    History of Doppler ultrasound 5-29-11    No hemodynamically significant carotid stenosis is identified. A thyroid nodule on each side. Dedicated ultrasound of thyroid gland is suggested to further evaluate if clinically indicated.      Hx of blood clots     Hyperlipidemia     severely elevated triglycerides    Hypertension     diastolic    Intracranial arachnoid cyst 11/2012    Dr. Eva Pickering drained, complicated with seizures, DKA    Irritable bowel syndrome     Liver disease     [place] - elevated LFT - positive smooth muscle antibody, steatosis per liver bx 3/2014 with Dr. Juliet Wallace (multiple drug resistant organisms) resistance 2012    MRSA (methicillin resistant staph aureus) culture positive     h/o in foot and before brain surgery    Nephrolithiasis     noted on CT abdomen 9/2016, 6/2019    Neuropathy     Pancreatic insufficiency     Positive SANDY (antinuclear antibody)     Dr. Digna Mcintyre - first visit in 6/2013    Pulmonary embolism ([address] 2007    s/p GFF, related to knee surgery    Restless legs syndrome     Seizures ([address]     Sinus tachycardia     Holter 3/2013 - seeing Dr. Maddy Mora fracture [facility])     clips     Sleep apnea     multiple MD's suggested testing but he refuses to be tested as claustraphobic and won't use Cpap    Type II or unspecified type diabetes mellitus without mention of complication, not stated as uncontrolled 2007       Past Surgical History:   Procedure Laterality Date    CARDIAC CATHETERIZATION      2011,5-6 years ago   [address]  3-2012   [address]  04/28/2016    [address] RELEASE  01/2017    CHOLECYSTECTOMY  2004    COLONOSCOPY  2012    COLONOSCOPY  08/2016    2 tubular adenomas - reportedly needs repeat scope in 6 months    CRANIOTOMY  11/2012    arachnoid cyst drainage    EKG 12-LEAD  10/18/2015         ENDOSCOPY, COLON, DIAGNOSTIC      HERNIA REPAIR Right 1996    [facility]--Dr. Kerwin Schneider INGUINAL HERNIA REPAIR Right 09/15/2016    Robotic assisted    JOINT REPLACEMENT      KNEE ARTHROSCOPY Right 2016    KNEE SURGERY Left 2007    acl and debrided twice    OTHER SURGICAL HISTORY  5-31-11    Tilt table was associated w/ nonspecific symptoms or nausea, otherwise no significant dizziness or syncope. No significant hemodynamic changes.  Otherwise, unremarkable tilt table test pantoprazole (PROTONIX) 40 MG tablet Take 1 tablet by mouth 2 times daily 180 tablet 1    insulin aspart (NOVOLOG FLEXPEN) 100 UNIT/ML injection pen Inject 30 Units into the skin 3 times daily (before meals) Plus sliding scale 4:50 > 150 mg/dl 30 pen 1    Melatonin 10 MG TABS Take 30 mg by mouth nightly       DULoxetine (CYMBALTA) 60 MG extended release capsule Take 120 mg by mouth daily       lacosamide (VIMPAT) 100 MG TABS tablet Take 300 mg by mouth 2 times daily.  Cyanocobalamin (VITAMIN B-12) 2500 MCG SUBL Place 2,500 mcg under the tongue daily      sildenafil (VIAGRA) 100 MG tablet Take 1 tablet by mouth as needed for Erectile Dysfunction 45 tablet 3    divalproex (DEPAKOTE) 500 MG DR tablet Take 500 mg by mouth 2 times daily       Blood Glucose Monitoring Suppl (ONE TOUCH BASIC SYSTEM) W/DEVICE KIT Use to test blood glucose level 4 times per day. Diagnosis: E11.40 1 kit 0    clindamycin (CLEOCIN T) 1 % external solution Apply topically 2 times daily. 30 mL 5    levETIRAcetam (KEPPRA) 500 MG tablet Take 2,000 mg by mouth 2 times daily       LORazepam (ATIVAN) 0.5 MG tablet Take 0.5 mg by mouth 2 times daily as needed (seizures)       zonisamide (ZONEGRAN) 100 MG capsule Take 500 mg by mouth nightly        No current facility-administered medications for this visit.         Allergies   Allergen Reactions    Ciprofloxacin-Ciproflox Hcl Er Other (See Comments)     Elevated creatinine    Heparin      Monitor for thrombocytopenia    Metformin Nausea Only     Abdominal pain, diarrhea    Niacin And Related Other (See Comments)     Burning sensation, severe flushing    Tramadol Hcl      Contraindication to seizure medications    Ultram [Tramadol]      Contraindication to seizure medications    Warfarin      Very difficult to regulate in past       Social History     Socioeconomic History    Marital status:      Spouse name: Not on file    Number of children: 3    Years of education: Not on file    Highest education level: Not on file   Occupational History    Occupation: Disability since 2011   Social Needs    Financial resource strain: Not on file    Food insecurity:     Worry: Not on file     Inability: Not on file   [address] needs:     Medical: Not on file     Non-medical: Not on file   Tobacco Use    Smoking status: Never Smoker    Smokeless tobacco: Never Used   Substance and Sexual Activity    Alcohol use: No     Alcohol/week: 0.0 standard drinks    Drug use: No    Sexual activity: Not on file   Lifestyle    Physical activity:     Days per week: Not on file     Minutes per session: Not on file    Stress: Not on file   Relationships    Social connections:     Talks on phone: Not on file     Gets together: Not on file     Attends Orthodoxy service: Not on file     Active member of club or organization: Not on file     Attends meetings of clubs or organizations: Not on file     Relationship status: Not on file    Intimate partner violence:     Fear of current or ex partner: Not on file     Emotionally abused: Not on file     Physically abused: Not on file     Forced sexual activity: Not on file   Other Topics Concern    Not on file   Social History Narrative    Not on file       Family History   Problem Relation Age of Onset    Heart Disease Father 61        MI    Stroke Brother     Obesity Brother     Arthritis Mother     Seizures Mother     Obesity Sister     Other Brother         pulmonary embolism    Diabetes Daughter     Seizures Brother        Review of Systems - General ROS: positive for -  fever   Psychological ROS: positive for - depression, no anxiety - working with psych and adjusting medication  Hematological and Lymphatic ROS: No history of bleeding disorder.   Positive history of DVT in legs and arm, positive PE - after knee surgeries  Respiratory ROS: no shortness of breath, wheezing, positive cough with infection  Cardiovascular ROS: chronic intermittent chest pain - thought to be due to anxiety (GI and cardiology aware and completed work-up in past) - patient states current chest pain is not different, no dyspnea on exertion, tolerates vacuuming  Gastrointestinal ROS: positive intermittent chronic abdominal pain - better now, no change in bowel habits, or black or bloody stools  Genito-Urinary ROS: no dysuria, trouble voiding, or hematuria - h/o frequency, urgency  Neurological ROS: negative for - weakness, positive numbness/pain in feet/legs/head, and seizures  Dermatological ROS: negative for - rash or skin lesion changes, except chronic rash on legs    Blood pressure 132/86, pulse 88, temperature 98.8 °F (37.1 °C), height 6' 2\" (1.88 m), weight 255 lb 6.4 oz (115.8 kg). Physical Examination: General appearance -alert, well appearing, and in no distress  Mental status - alert, oriented to person, place, and time  Head - mild maxillary sinus tenderness with percussion, no frontal sinus tenderness  Neck - supple, no significant adenopathy  Chest - clear to auscultation, no wheezes, no rales or rhonchi, symmetric air entry  Heart - normal rate, regular rhythm, normal S1, S2, no murmurs, rubs, clicks or gallops  Abdomen -soft, no tenderness to palpation, nondistended  Extremities - peripheral pulses normal, no pedal edema, no clubbing or cyanosis  Skin - warm and dry    Foot exam 11/6/19: no foot lesions, sensation decreased to level of mid shin with monofilament testing bilaterally. +2 DP and PT pulses bilaterally. Diagnostic Data:  Reviewed labs from 12/26/19 with patient. Assessment and Plan:     Diagnosis Orders   1. Acute non-recurrent sinusitis, unspecified location  amoxicillin-clavulanate (AUGMENTIN) 875-125 MG per tablet   2. Vitamin B12 deficiency     3. Vitamin D deficiency       Sinusitis/URI - treat with Augmentin and call if not improving.       Vitamin B12 and vitamin D deficiency -already increased Vitamin D to 50,000 IU weekly, need to find out what dose of B12 he is taking and check intrinsic factor. Follow up visit 3/2020. Eliseo Ibarra MD    [facility]  PHYSICIANS [facility]. [address]   [address]  [facility]  Dept: [phone]  Dept Fax: [phone] : [phone]

## 2020-01-13 NOTE — CARE COORDINATION
Spoke with pt today. He has been having cough, nasal/chest congestion producing moderate amts of green sputum for appx 1 wk now. Reports other family members have been sick as well including his MIL and grand children. Reports fever of 102 last few days. Started vomiting yesterday. Appetite poor. Reports that he is drinking fluids, but probably not as much as he should be. Encouraged to drink more. Taking OTC Tylenol cold and flu and Nyquil for symptom mgmt. BS's running in the 300's. Is still working, but called in today d/t being sick. Pt reports that he could probably come in today if you feel he needs to be seen and if schedule allows. Would prefer to not come in if possible, but understands if needs to. Please advise.

## 2020-01-13 NOTE — CARE COORDINATION
Office will notify when insulin comes in. Discussed paperwork completion for Jardiance. Pt completed paper work and submitted to Eliana Bustos mid Dec.  Has not received any update on this yet. Has not had any pain with swallowing liquids recently. Has not had any pain at hernia site recently. Reminded pt that he needs to reschedule colonoscopy as well as appt with Dr. Indy Nathan for evaluation of hernia. Plan of care:  Message sent to PCP re: cough, nasal and chest congestion, fever. Salivary cortisol lab order mailed to pt. F/u with Medicare re: application submitted last mth (re: assist for jardiance)  Office will call when insulin arrives  Rest and increase water intake  Eat bland diet: toast, broth, soup  Continue monitoring temp. Continue taking OTC medications for mgmt of symptoms unless otherwise instructed. Continue monitoring BS's. Obtain labs as soon as possible for intrinsic factor and cortisol levels  Reschedule colonoscopy and appt with Geeta. Diabetes Assessment    Medic Alert ID:  No  Meal Planning:  Avoidance of concentrated sweets, Carb counting   How often do you test your blood sugar?:  Meals   Do you have barriers with adherence to non-pharmacologic self-management interventions?  (Nutrition/Exercise/Self-Monitoring):  No   Have you ever had to go to the ED for symptoms of low blood sugar?:  No       Do you have hyperglycemia symptoms?:  No   Do you have hypoglycemia symptoms?:  No   Last Blood Sugar Value:  300   Blood Sugar Monitoring Regimen:  Before Meals   Blood Sugar Trends:  Fluctuating       and   General Assessment    Do you have any symptoms that are causing concern?:  Yes  Progression since Onset:  Unchanged  Reported Symptoms:  Cough, Congestion, Nausea, Vomiting, Fever, Fatigue (Comment: message sent to PCP re: symptoms.  )                 Care Coordination Interventions    Program Enrollment:  Complex Care  Referral from Primary Care Provider:  No  Suggested Interventions and [facility]  Medication Assistance Program:  Completed (Comment: med assist forms completed. pt receiving insulin through "[employer]ip program. pt picking up 10/2 at PCP office. will also be receiving jardiance. pt has to apply for Medicare Extra Help again. pt aware. )  Medi Set or Pill Pack:  Declined (Comment: used pill box in past, did not like using one)  Transportation Support:  Declined  Zone Management Tools:  Completed         Goals Addressed    None         Prior to Admission medications    Medication Sig Start Date End Date Taking? Authorizing Provider   fenofibrate (TRICOR) 145 MG tablet Take 1 tablet by mouth daily 12/17/19   Cassandra Cabrera MD   atorvastatin (LIPITOR) 10 MG tablet TAKE 1 TABLET BY MOUTH ONE TIME A DAY 12/17/19   Cassandra Cabrera MD   rOPINIRole (REQUIP) 1 MG tablet TAKE 1 TABLET BY MOUTH THREE TIMES A DAY 12/17/19   Cassandra Cabrera MD   vitamin D (ERGOCALCIFEROL) 1.25 MG (15117 UT) CAPS capsule Take 1 capsule by mouth once a week 12/17/19   Cassandra Cabrera MD   metoprolol tartrate (LOPRESSOR) 50 MG tablet Take 1.5 tablets by mouth 2 times daily 12/17/19   Cassandra Cabrera MD   gabapentin (NEURONTIN) 600 MG tablet Take 1 tablet by mouth 4 times daily for 180 days. 12/17/19 6/14/20  Cassandra Cabrera MD   blood glucose monitor kit and supplies Test 3 times a day dx:E11.40 11/26/19   Cassandra Cabrera MD   blood glucose test strips (ONE TOUCH TEST STRIPS) strip Use to test blood glucose level 3 times per day.  Diagnosis: E11.40 11/25/19   Cassandra Cabrera MD   empagliflozin (JARDIANCE) 25 MG tablet Take 25 mg by mouth daily 9/18/19   Cassandra Cabrera MD   insulin glargine [facility]) 100 UNIT/ML injection pen 75 units in AM and 55 units in PM 9/9/19   Cassandra Cabrera MD   promethazine (PHENERGAN) 12.5 MG tablet Take 1-2 tablets by mouth every 8 hours as needed for Nausea 9/5/19   Cassandra Cabrera MD   pantoprazole (PROTONIX) 40 MG tablet Take 1 tablet by mouth 2 times daily 8/12/19   Bonilla Shipman MD   insulin aspart (NOVOLOG FLEXPEN) 100 UNIT/ML injection pen Inject 30 Units into the skin 3 times daily (before meals) Plus sliding scale 4:50 > 150 mg/dl 8/12/19   Bonilla Shipman MD   Melatonin 10 MG TABS Take 30 mg by mouth nightly     Historical Provider, MD   DULoxetine (CYMBALTA) 60 MG extended release capsule Take 120 mg by mouth daily     Historical Provider, MD   lacosamide (VIMPAT) 100 MG TABS tablet Take 300 mg by mouth 2 times daily. Historical Provider, MD   Cyanocobalamin (VITAMIN B-12) 2500 MCG SUBL Place 2,500 mcg under the tongue daily    Historical Provider, MD   sildenafil (VIAGRA) 100 MG tablet Take 1 tablet by mouth as needed for Erectile Dysfunction 1/29/19   Bonilla Shipman MD   divalproex (DEPAKOTE) 500 MG DR tablet Take 500 mg by mouth 2 times daily  4/5/18   Historical Provider, MD   Blood Glucose Monitoring Suppl ([address]) W/DEVICE KIT Use to test blood glucose level 4 times per day. Diagnosis: E11.40 4/6/17   Kirit Melgoza MD   clindamycin (CLEOCIN T) 1 % external solution Apply topically 2 times daily.  8/4/16   Bonilla Shipman MD   levETIRAcetam (KEPPRA) 500 MG tablet Take 2,000 mg by mouth 2 times daily     Historical Provider, MD   LORazepam (ATIVAN) 0.5 MG tablet Take 0.5 mg by mouth 2 times daily as needed (seizures)     Historical Provider, MD   zonisamide (ZONEGRAN) 100 MG capsule Take 500 mg by mouth nightly     Historical Provider, MD       Future Appointments   Date Time Provider Staci Jay   3/17/2020  1:30 PM Bonilla Shipman MD [address] [place] [address]

## 2020-01-27 ENCOUNTER — CARE COORDINATION (OUTPATIENT)
Dept: CARE COORDINATION | Age: 50
End: 2020-01-27

## 2020-02-05 ENCOUNTER — CARE COORDINATION (OUTPATIENT)
Dept: CARE COORDINATION | Age: 50
End: 2020-02-05

## 2020-02-05 NOTE — CARE COORDINATION
Ambulatory Care Coordination Note  2/5/2020  CM Risk Score: 5  Charlson 10 Year Mortality Risk Score: 47%     ACC: Shantel Pickard, RN    Summary Note: Yan Alvarez is being followed by care coordination for education and assistance in managing his DM and other chronic conditions. Spoke with Yan Alvarez today for f/u. Up until 2wks ago Yan Alvarez was working. He reports that he was offered to stay at his place of work, but d/t holidays being over he was going to have to take a pay cut. Yan Alvarez states that financially it just wasn't affordable as they would have to put grandkids in day care and that would take most of his check. Encouraged to consider looking in to something different as pt's BS's and overall symptoms were much improved when he was working. Pt also shared has more stress d/t recently finding out his other daughter is pregnant. Reports that she has severe n/v t/o pregnancy and is currently in the hospital.    DM: pt continues to have fluctuations in BS readings. Had 1 recent low. BS dropped to 31. Wife was able to get it back up. Pt reports that it occurred around 11am.  Had not ate anything. Reinforced to pt that he cannot skip meals. Stressed the need to go no more than every 4-5 hrs without eating. Majority of BS's running 100-160. Has had some readings in the 200's and one BS reading in the 400's over past 2wks. .    GI: Pt reports that he has been battling with more n/v.  Pt has been dealing with this chronically for quite some time. Had cancelled colonoscopy previously. Has yet to reschedule. Shares with me that he has noted some blood in his stool at times. Reports not occurring all of the time. Pt states wife feels it's hemorrhoids. With these symptoms I stressed the need for him to re schedule his colonoscopy ASAP. Pt reports that he will try to call in next wk or so. Was seen last mth by PCP for URI symptoms. Pt reports that symptoms resolved with atb that was prescribed.    Still needs to get labs that were ordered by PCP. Pt has supplies for urinary cortisol, but needs to  supplies at lab for salivary cortisol level. States that he can do that yet this wk. Stressed the need to get ordered labs done as soon as possibly able. Has had a few seizures over the last wk. Has been in contact with his neurologist.  No med changes or orders. Encouraged to continue to monitor closely. Has appt in March. Plan of care:  Schedule colonoscopy  Obtain labs as ordered   Continue monitoring BS's. Encouraged to stay as active as possible as BS's were best when he was getting activity while working  Continue close f/u with neurology re: seizures. If having frequent episodes of hypoglycemia contact office! Pt continues to receive drug assistance from Rehan Landaverde on his insulin  Call with any new questions or concerns. No changes to home situation. F/u with PCP and specialists. Needs to arrange testing. Pt is aware of what all he needs to have done. Reminded of upcoming f/u appt with PCP. Has not had any recent hospitalizations or ED visits. Last hospitalization was in August. Will graduate from care coordination at this time. Pt aware to call with any new questions, concerns, or issues. Diabetes Assessment    Medic Alert ID:  No  Meal Planning:  Avoidance of concentrated sweets, Carb counting   How often do you test your blood sugar?:  Meals   Do you have barriers with adherence to non-pharmacologic self-management interventions?  (Nutrition/Exercise/Self-Monitoring):  No   Have you ever had to go to the ED for symptoms of low blood sugar?:  No       Do you have hyperglycemia symptoms?:  No   Do you have hypoglycemia symptoms?:  No   Last Blood Sugar Value:  213   Blood Sugar Monitoring Regimen:  Before Meals, At Bedtime                    Care Coordination Interventions    Program Enrollment:  Complex Care  Referral from Primary Care Provider:  No  Suggested Interventions and Community Resources  Medication Assistance Program:  Completed (Comment: med assist forms completed. pt receiving insulin through hardship program. pt picking up 10/2 at PCP office. will also be receiving jardiance. pt has to apply for Medicare Extra Help again. pt aware. )  Medi Set or Pill Pack:  Declined (Comment: used pill box in past, did not like using one)  Transportation Support:  Declined  Zone Management Tools:  Completed         Goals Addressed                 This Visit's Progress     COMPLETED: Conditions and Symptoms        I will schedule office visits, as directed by my provider. I will keep my appointment or reschedule if I have to cancel. I will notify my provider of any barriers to my plan of care. I will follow my Zone Management tool to seek urgent or emergent care. I will notify my provider of any symptoms that indicate a worsening of my condition. DM    Barriers: financial, lack of support, overwhelmed by complexity of regimen, stress and time constraints  Plan for overcoming my barriers: support from PCP, ACM. Education on zone mgmt and early symptom recognition and reporting. Confidence: 8/10  Anticipated Goal Completion Date: 2/16/20              Prior to Admission medications    Medication Sig Start Date End Date Taking? Authorizing Provider   fenofibrate (TRICOR) 145 MG tablet Take 1 tablet by mouth daily 12/17/19   Dolly Lorenzo MD   atorvastatin (LIPITOR) 10 MG tablet TAKE 1 TABLET BY MOUTH ONE TIME A DAY 12/17/19   Dolly Lorenzo MD   rOPINIRole (REQUIP) 1 MG tablet TAKE 1 TABLET BY MOUTH THREE TIMES A DAY 12/17/19   Dolly Lorenzo MD   vitamin D (ERGOCALCIFEROL) 1.25 MG (94591 UT) CAPS capsule Take 1 capsule by mouth once a week 12/17/19   Dolly Lorenzo MD   metoprolol tartrate (LOPRESSOR) 50 MG tablet Take 1.5 tablets by mouth 2 times daily 12/17/19   Dolly Lorenzo MD   gabapentin (NEURONTIN) 600 MG tablet Take 1 tablet by mouth 4 times daily for 180 days. 12/17/19 6/14/20  Stewart Stringer MD   blood glucose monitor kit and supplies Test 3 times a day dx:E11.40 11/26/19   Stewart Stringer MD   blood glucose test strips (ONE TOUCH TEST STRIPS) strip Use to test blood glucose level 3 times per day. Diagnosis: E11.40 11/25/19   Stewart Stringer MD   empagliflozin (JARDIANCE) 25 MG tablet Take 25 mg by mouth daily 9/18/19   Stewart Stringer MD   insulin glargine [facility]) 100 UNIT/ML injection pen 75 units in AM and 55 units in PM  Patient taking differently: 80 units in AM and 80 units in PM 9/9/19   Stewart Stringer MD   promethazine (PHENERGAN) 12.5 MG tablet Take 1-2 tablets by mouth every 8 hours as needed for Nausea 9/5/19   Stewart Stringer MD   pantoprazole (PROTONIX) 40 MG tablet Take 1 tablet by mouth 2 times daily 8/12/19   Stewart Stringer MD   insulin aspart (NOVOLOG FLEXPEN) 100 UNIT/ML injection pen Inject 30 Units into the skin 3 times daily (before meals) Plus sliding scale 4:50 > 150 mg/dl 8/12/19   Stewatr Stringer MD   Melatonin 10 MG TABS Take 30 mg by mouth nightly     Historical Provider, MD   DULoxetine (CYMBALTA) 60 MG extended release capsule Take 120 mg by mouth daily     Historical Provider, MD   lacosamide (VIMPAT) 100 MG TABS tablet Take 300 mg by mouth 2 times daily. Historical Provider, MD   Cyanocobalamin (VITAMIN B-12) 2500 MCG SUBL Place 2,500 mcg under the tongue daily    Historical Provider, MD   sildenafil (VIAGRA) 100 MG tablet Take 1 tablet by mouth as needed for Erectile Dysfunction 1/29/19   Stewart Stringer MD   divalproex (DEPAKOTE) 500 MG DR tablet Take 500 mg by mouth 2 times daily  4/5/18   Historical Provider, MD   Blood Glucose Monitoring Suppl ([address]) W/DEVICE KIT Use to test blood glucose level 4 times per day. Diagnosis: E11.40 4/6/17   Michel Pendleton MD   clindamycin (CLEOCIN T) 1 % external solution Apply topically 2 times daily.  8/4/16   Stewart Stringer MD levETIRAcetam (KEPPRA) 500 MG tablet Take 2,000 mg by mouth 2 times daily     Historical Provider, MD   LORazepam (ATIVAN) 0.5 MG tablet Take 0.5 mg by mouth 2 times daily as needed (seizures)     Historical Provider, MD   zonisamide (ZONEGRAN) 100 MG capsule Take 500 mg by mouth nightly     Historical Provider, MD       Future Appointments   Date Time Provider Staci Macisti   3/17/2020  1:30 PM Elsa Roman MD [address] [facility]

## 2020-02-21 ENCOUNTER — TELEPHONE (OUTPATIENT)
Dept: INTERNAL MEDICINE CLINIC | Age: 50
End: 2020-02-21

## 2020-02-21 ENCOUNTER — NURSE ONLY (OUTPATIENT)
Dept: LAB | Age: 50
End: 2020-02-21

## 2020-02-21 LAB — VITAMIN D 25-HYDROXY: 9 NG/ML (ref 30–100)

## 2020-02-21 NOTE — TELEPHONE ENCOUNTER
Forrest Marks from the lab called saying that she released u/s kidneys order by mistake. She states we need to put the order back in.

## 2020-02-23 LAB — INTRINSIC FACTOR BLOCKING AB: NORMAL

## 2020-02-24 ENCOUNTER — CARE COORDINATION (OUTPATIENT)
Dept: CARE COORDINATION | Age: 50
End: 2020-02-24

## 2020-02-24 LAB — CORTISOL (UR), FREE: NORMAL

## 2020-02-24 NOTE — CARE COORDINATION
Received call from pt today asking about US kidneys. Unsure why he needs to have one done. Message left with Holly at PCP office re: this. she states that it was ordered d/t pt's c/o flank pain and h/o kidney stones. does have h/o kidney stones. Notified pt and informed him of this. Pt reports that since Thursday he has been experiencing increased abd pain in left upper quadrant. Describes pain as a dull ache. Has also been vomiting. Was able to keep eggs down yesterday, but has not tried eating anything yet today. Taking phenergan to help with the nausea. BS today was 260. Also reports having a seizure over the weekend. Pt declines need for evaluation at ED. States Felicitane Saydaas never find anything anyway and it just ends up costing me money. \"  Encouraged pt to continue following bland diet and to try to stay hydrated as much as possible. Has phenergan to take PRN for nausea. Please advise with additional recommendations.

## 2020-02-26 NOTE — TELEPHONE ENCOUNTER
Pt returned call. Offered appt tomorrow at 1. Pt declines states that will not work. Declines seeing anyone other than Dr. Jayne Ledbetter. Offered appt March 9th. Pt declines that as well. States \"i'll just wait until I see her. \" pt has appt 3/17. Informed him that we could move that up to March 9th, but he declined. States that he has sick grandchild at home today anyway. Advised to call if symptoms worsen.

## 2020-03-02 ENCOUNTER — NURSE ONLY (OUTPATIENT)
Dept: LAB | Age: 50
End: 2020-03-02

## 2020-03-24 ENCOUNTER — CARE COORDINATION (OUTPATIENT)
Dept: CARE COORDINATION | Age: 50
End: 2020-03-24

## 2020-03-25 PROBLEM — K21.9 ACID REFLUX: Status: RESOLVED | Noted: 2020-03-25 | Resolved: 2020-03-24

## 2020-03-26 ENCOUNTER — TELEPHONE (OUTPATIENT)
Dept: INTERNAL MEDICINE CLINIC | Age: 50
End: 2020-03-26

## 2020-03-26 ENCOUNTER — CARE COORDINATION (OUTPATIENT)
Dept: CARE COORDINATION | Age: 50
End: 2020-03-26

## 2020-03-26 NOTE — CARE COORDINATION
Hector contacted myself this AM.  States that he tried contacting office to schedule an appt but was told no appts were being scheduled. Pt states \"I have been having n/v/d since last Friday. \"  Pt reports that he has not been able to keep food down. Has been drinking gatorade but states \"it goes right through me. \"  Had 4 episodes of diarrhea during the night. Has not had any vomiting since Tues but nausea has continued. States that he has been too afraid to eat anything as he is afraid he will start vomiting again. States that when he was trying to eat he would develop abd pain. In the last day he has developed a cough causing lower rib area to hurt. Reports despite lack of eating he has not experienced any episodes of hypoglycemia. BS today was 180. Reports that he has cut back on his insulin d/t his symptoms. Pt has not contacted his GI doctor at this time. States that he wanted to let PCP know about his symptoms first to get her recommendations. Please advise. Message left with PCP office. Received call back from Holly at PCP office. She spoke with PCP and recommendation is for pt to contact GI and arrange appt. Attempted to reach [facility]. No answer. Message left to return call.

## 2020-03-26 NOTE — TELEPHONE ENCOUNTER
Vivi RN called on patient stating that when she spoke with him patient was complaining of vomiting and diarrhea this past week . No vomiting the past 24 hours . Patient has not contact his GI doctor . Angel Cortes was asking about a possible v v visit with Dr. Fern Westbrook. Spoke with Dr. Fern Westbrook and she states that patient really need to be seen by GI . We have been trying over and over to get him to see GI for the abdominal pain and nausea and vomiting and this is the prefect time to get in as they are not doing procedures . Notified Angel Cortes of Dr. Matthew Yadav recommendations that patient needs to see GI unless he is also having issues with his blood sugar . I will be schedule his appointment if patient prefers just let me know .

## 2020-03-30 ENCOUNTER — OFFICE VISIT (OUTPATIENT)
Dept: PRIMARY CARE CLINIC | Age: 50
End: 2020-03-30
Payer: MEDICARE

## 2020-03-30 VITALS
DIASTOLIC BLOOD PRESSURE: 92 MMHG | HEART RATE: 93 BPM | RESPIRATION RATE: 16 BRPM | SYSTOLIC BLOOD PRESSURE: 153 MMHG | OXYGEN SATURATION: 98 % | TEMPERATURE: 99.1 F

## 2020-03-30 LAB
INFLUENZA VIRUS A RNA: NEGATIVE
INFLUENZA VIRUS B RNA: NEGATIVE
STREPTOCOCCUS A RNA: NEGATIVE

## 2020-03-30 PROCEDURE — 87502 INFLUENZA DNA AMP PROBE: CPT | Performed by: NURSE PRACTITIONER

## 2020-03-30 PROCEDURE — G8428 CUR MEDS NOT DOCUMENT: HCPCS | Performed by: NURSE PRACTITIONER

## 2020-03-30 PROCEDURE — G8417 CALC BMI ABV UP PARAM F/U: HCPCS | Performed by: NURSE PRACTITIONER

## 2020-03-30 PROCEDURE — G8484 FLU IMMUNIZE NO ADMIN: HCPCS | Performed by: NURSE PRACTITIONER

## 2020-03-30 PROCEDURE — 1036F TOBACCO NON-USER: CPT | Performed by: NURSE PRACTITIONER

## 2020-03-30 PROCEDURE — 99213 OFFICE O/P EST LOW 20 MIN: CPT | Performed by: NURSE PRACTITIONER

## 2020-03-30 PROCEDURE — 87651 STREP A DNA AMP PROBE: CPT | Performed by: NURSE PRACTITIONER

## 2020-03-30 RX ORDER — AZITHROMYCIN 250 MG/1
250 TABLET, FILM COATED ORAL SEE ADMIN INSTRUCTIONS
Qty: 6 TABLET | Refills: 0 | Status: SHIPPED | OUTPATIENT
Start: 2020-03-30 | End: 2020-04-02

## 2020-03-30 RX ORDER — BENZONATATE 100 MG/1
100 CAPSULE ORAL 3 TIMES DAILY PRN
Qty: 21 CAPSULE | Refills: 0 | Status: SHIPPED | OUTPATIENT
Start: 2020-03-30 | End: 2020-04-06

## 2020-03-30 ASSESSMENT — ENCOUNTER SYMPTOMS
CHEST TIGHTNESS: 1
NAUSEA: 1
EYE PAIN: 0
ABDOMINAL PAIN: 0
COUGH: 1
EYES NEGATIVE: 1
SHORTNESS OF BREATH: 1
VOMITING: 1
RHINORRHEA: 1
SORE THROAT: 1
WHEEZING: 0
DIARRHEA: 1
EYE REDNESS: 0
ALLERGIC/IMMUNOLOGIC NEGATIVE: 1
COLOR CHANGE: 0
TROUBLE SWALLOWING: 0

## 2020-03-30 NOTE — PROGRESS NOTES
[address]  [address]  [place], [address]  [phone]      Aden Blair is a 52 y.o. male that presents for URI (c/o prod cough, nasal diomedes. Started w/diarrhea and vomiting 10 days ago. Fever around 100 since Wed. Significant other works in [employer]. Also c/o [place], [employer]. )      URI Symptoms    HPI:      Symptoms have been present for 7 day(s). Symptoms are worse since they initially started. Fever? Yes -   Runny nose or congestion? Yes   Cough? Yes  Sore throat? Yes  Headache, fatigue, joint pains, muscle aches? Yes  Shortness of breath/Wheezing? Yes  Nausea/Vomiting/Diarrhea? Yes - intially but this resolved  Double Sickening? No  Sick contacts? Yes - his wife works at [facility] as a nurse, she has also been sick. Apparently the patient reports an ER physician at [facility] took care of a patient who tested positive for COVID-19. He is currently under quarantine. The patient's spouse did work directly with this physician. Patient has tried nothing without improvement. I have reviewed the patient's past medical history, past surgical history, allergies,medications, social and family history and I have made updates where appropriate.     Past Medical History:   Diagnosis Date    Abnormal thyroid biopsy     Acid reflux     Anemia     previously seen by Dr. Amanda Navarro (due to enlarged spleen)    Bipolar disorder ([facility] [address]     [facility]    [facility]    thyroid    Chest pain     previously seeing [address] [place] [address] cardiologist, now seeing Dr. Denise Holm (LAD bridging on cath 4/2016)    Chronic back pain     Chronic bronchitis ([facility] [address]     Dr. Opal Albrecht 10/2012 - no longer following with him    Colon polyp 08/30/2016    Depression     Dr. Ruy Menendez DVT (deep venous thrombosis) ([facility] [address] [id number]    not on Coumadin due to unable to regulate - Dr. Hudson - no etiology found per patient    Esophageal abnormality     nodule     Fatty liver disease, tightness and shortness of breath. Negative for wheezing. Cardiovascular: Negative. Negative for chest pain. Gastrointestinal: Positive for diarrhea, nausea and vomiting. Negative for abdominal pain. Endocrine: Negative. Negative for polydipsia, polyphagia and polyuria. Genitourinary: Negative. Negative for decreased urine volume, dysuria, frequency and urgency. Musculoskeletal: Negative. Negative for arthralgias and myalgias. Skin: Negative. Negative for color change and rash. Allergic/Immunologic: Negative. Neurological: Negative. Negative for dizziness, tremors, syncope and headaches. Hematological: Negative. Negative for adenopathy. Psychiatric/Behavioral: Negative. All other systems reviewed and are negative.         PHYSICAL EXAM:  BP (!) 153/92   Pulse 93   Temp 99.1 °F (37.3 °C) (Oral)   Resp 16   SpO2 98%     General Appearance: well developed and well- nourished, in no acute distress  Head: normocephalic and atraumatic  Eyes: pupils equal, round, and reactive to light,  conjunctivae and eye lids without erythema  ENT: external ear and ear canal normal bilaterally, nose without deformity, nasal mucosa and turbinates normal without polyps, oropharynx normal, dentition is normal for age, no lip or gum lesions noted  Neck: supple and non-tender without mass, no thyromegaly or thyroid nodules, no cervical lymphadenopathy  Pulmonary/Chest: clear to auscultation bilaterally- no wheezes, rales or rhonchi, normal air movement, no respiratory distress or retractions  Cardiovascular: normal rate, regular rhythm, normal S1 and S2, no murmurs, rubs, clicks, or gallops,distal pulses intact  Abdomen: soft, non-tender, non-distended, normal bowel sounds,  no rebound or guarding, nomasses or hernias noted, no hepatospleenomegaly  Extremities: no cyanosis, clubbing or edema of the lower extremities  Musculoskeletal: No joint swelling or gross deformity   Neuro:  Alert, 5/5 strength globally and symmetrically, normal speech, no focal findings or movement disorder noted, gait wnl  Psych:  Normal affect without evidence of depression or anxiety, insight and judgement are appropriate, memory appears intact  Skin: warm and dry, no rash or erythema  Lymph:  No cervical, auricular or supraclavicular lymph nodes palpated    ASSESSMENT & PLAN  Daina Joseph was seen today for uri. Diagnoses and all orders for this visit:    Acute bronchitis due to infection  -     POCT Influenza A/B DNA (Alere i)  -     POCT Rapid Strep A DNA  -     azithromycin (ZITHROMAX) 250 MG tablet; Take 1 tablet by mouth See Admin Instructions for 5 days 500mg on day 1 followed by 250mg on days 2 - 5  -     benzonatate (TESSALON) 100 MG capsule; Take 1 capsule by mouth 3 times daily as needed for Cough    - flu and strep testing negative  - will elect to treat with Zpak and tessalon perles for cough    -Suspect possible covid-19 infection, appears stable for outpatient care. Advised on conservative care, self isolate for 2 weeks through 4/7/20, handout given today, ER precautions given today    Return if symptoms worsen or fail to improve. Daina Joseph received counseling on the following healthy behaviors: hand hygiene  Reviewedprior labs and health maintenance. Continue current medications, diet and exercise. Discussed use, benefit, and side effects of prescribed medications. Barriers to medication compliance addressed. Patient given educationalmaterials - see patient instructions. All patient questions answered. Patient voiced understanding.

## 2020-03-30 NOTE — PATIENT INSTRUCTIONS
Patient Education        Bronchitis: Care Instructions  Your Care Instructions    Bronchitis is inflammation of the bronchial tubes, which carry air to the lungs. The tubes swell and produce mucus, or phlegm. The mucus and inflamed bronchial tubes make you cough. You may have trouble breathing. Most cases of bronchitis are caused by viruses like those that cause colds. Antibiotics usually do not help and they may be harmful. Bronchitis usually develops rapidly and lasts about 2 to 3 weeks in otherwise healthy people. Follow-up care is a key part of your treatment and safety. Be sure to make and go to all appointments, and call your doctor if you are having problems. It's also a good idea to know your test results and keep a list of the medicines you take. How can you care for yourself at home? · Take all medicines exactly as prescribed. Call your doctor if you think you are having a problem with your medicine. · Get some extra rest.  · Take an over-the-counter pain medicine, such as acetaminophen (Tylenol), ibuprofen (Advil, Motrin), or naproxen (Aleve) to reduce fever and relieve body aches. Read and follow all instructions on the label. · Do not take two or more pain medicines at the same time unless the doctor told you to. Many pain medicines have acetaminophen, which is Tylenol. Too much acetaminophen (Tylenol) can be harmful. · Take an over-the-counter cough medicine that contains dextromethorphan to help quiet a dry, hacking cough so that you can sleep. Avoid cough medicines that have more than one active ingredient. Read and follow all instructions on the label. · Breathe moist air from a humidifier, hot shower, or sink filled with hot water. The heat and moisture will thin mucus so you can cough it out. · Do not smoke. Smoking can make bronchitis worse. If you need help quitting, talk to your doctor about stop-smoking programs and medicines.  These can increase your chances of quitting for from your local or state health department. You should follow the prevention steps below until a healthcare provider or local or state health department says you can return to your normal activities. Stay home except to get medical care  People who are mildly ill with COVID-19 are able to isolate at home during their illness. You should restrict activities outside your home, except for getting medical care. Do not go to work, school, or public areas. Avoid using public transportation, ride-sharing, or taxis. Separate yourself from other people and animals in your home  People: As much as possible, you should stay in a specific room and away from other people in your home. Also, you should use a separate bathroom, if available. Animals: You should restrict contact with pets and other animals while you are sick with COVID-19, just like you would around other people. Although there have not been reports of pets or other animals becoming sick with COVID-19, it is still recommended that people sick with COVID-19 limit contact with animals until more information is known about the virus. When possible, have another member of your household care for your animals while you are sick. If you are sick with COVID-19, avoid contact with your pet, including petting, snuggling, being kissed or licked, and sharing food. If you must care for your pet or be around animals while you are sick, wash your hands before and after you interact with pets and wear a facemask. Call ahead before visiting your doctor  If you have a medical appointment, call the healthcare provider and tell them that you have or may have COVID-19. This will help the healthcare providers office take steps to keep other people from getting infected or exposed. Wear a facemask  You should wear a facemask when you are around other people (e.g., sharing a room or vehicle) or pets and before you enter a healthcare providers office.  If you are not able to wear a

## 2020-03-30 NOTE — LETTER
[address] [address]  Phone: [phone]  Fax: [phone]    Chapito Marquez CNP      March 30, 2020     Patient: Amanda Finch   YOB: 1970   Date of Visit: 3/30/2020       To Whom It May Concern: It is my medical opinion that Darlene Vale may return to work on 4/8/20. If you have any questions or concerns, please don't hesitate to call.     Sincerely,        Electronically signed by NICANOR Garcia CNP on 3/30/2020 at 9:00 AM

## 2020-03-31 ENCOUNTER — TELEPHONE (OUTPATIENT)
Dept: FAMILY MEDICINE CLINIC | Age: 50
End: 2020-03-31

## 2020-04-02 ENCOUNTER — OFFICE VISIT (OUTPATIENT)
Dept: PRIMARY CARE CLINIC | Age: 50
End: 2020-04-02
Payer: MEDICARE

## 2020-04-02 ENCOUNTER — HOSPITAL ENCOUNTER (OUTPATIENT)
Age: 50
Discharge: HOME | End: 2020-04-02
Payer: MEDICARE

## 2020-04-02 ENCOUNTER — HOSPITAL ENCOUNTER (OUTPATIENT)
Dept: GENERAL RADIOLOGY | Age: 50
Discharge: HOME | End: 2020-04-02
Payer: MEDICARE

## 2020-04-02 VITALS
WEIGHT: 247 LBS | HEART RATE: 72 BPM | OXYGEN SATURATION: 98 % | SYSTOLIC BLOOD PRESSURE: 138 MMHG | BODY MASS INDEX: 32.74 KG/M2 | RESPIRATION RATE: 16 BRPM | HEIGHT: 73 IN | TEMPERATURE: 98.6 F | DIASTOLIC BLOOD PRESSURE: 74 MMHG

## 2020-04-02 LAB
ANION GAP SERPL CALCULATED.3IONS-SCNC: 13 MEQ/L (ref 8–16)
BUN BLDV-MCNC: 7 MG/DL (ref 7–22)
CALCIUM SERPL-MCNC: 9.1 MG/DL (ref 8.5–10.5)
CHLORIDE BLD-SCNC: 100 MEQ/L (ref 98–111)
CO2: 26 MEQ/L (ref 23–33)
CREAT SERPL-MCNC: 0.5 MG/DL (ref 0.4–1.2)
ERYTHROCYTE [DISTWIDTH] IN BLOOD BY AUTOMATED COUNT: 12.8 % (ref 11.5–14.5)
ERYTHROCYTE [DISTWIDTH] IN BLOOD BY AUTOMATED COUNT: 39.8 FL (ref 35–45)
GFR SERPL CREATININE-BSD FRML MDRD: > 90 ML/MIN/1.73M2
GLUCOSE BLD-MCNC: 296 MG/DL (ref 70–108)
HCT VFR BLD CALC: 43.9 % (ref 42–52)
HEMOGLOBIN: 15.6 GM/DL (ref 14–18)
MCH RBC QN AUTO: 31 PG (ref 26–33)
MCHC RBC AUTO-ENTMCNC: 35.5 GM/DL (ref 32.2–35.5)
MCV RBC AUTO: 87.3 FL (ref 80–94)
PLATELET # BLD: 267 THOU/MM3 (ref 130–400)
PMV BLD AUTO: 10.8 FL (ref 9.4–12.4)
POTASSIUM SERPL-SCNC: 3.9 MEQ/L (ref 3.5–5.2)
RBC # BLD: 5.03 MILL/MM3 (ref 4.7–6.1)
SODIUM BLD-SCNC: 139 MEQ/L (ref 135–145)
WBC # BLD: 6.9 THOU/MM3 (ref 4.8–10.8)

## 2020-04-02 PROCEDURE — 80048 BASIC METABOLIC PNL TOTAL CA: CPT

## 2020-04-02 PROCEDURE — G8417 CALC BMI ABV UP PARAM F/U: HCPCS | Performed by: FAMILY MEDICINE

## 2020-04-02 PROCEDURE — 1036F TOBACCO NON-USER: CPT | Performed by: FAMILY MEDICINE

## 2020-04-02 PROCEDURE — 99214 OFFICE O/P EST MOD 30 MIN: CPT | Performed by: FAMILY MEDICINE

## 2020-04-02 PROCEDURE — 85027 COMPLETE CBC AUTOMATED: CPT

## 2020-04-02 PROCEDURE — 2022F DILAT RTA XM EVC RTNOPTHY: CPT | Performed by: FAMILY MEDICINE

## 2020-04-02 PROCEDURE — 93000 ELECTROCARDIOGRAM COMPLETE: CPT | Performed by: FAMILY MEDICINE

## 2020-04-02 PROCEDURE — 71046 X-RAY EXAM CHEST 2 VIEWS: CPT

## 2020-04-02 PROCEDURE — G8427 DOCREV CUR MEDS BY ELIG CLIN: HCPCS | Performed by: FAMILY MEDICINE

## 2020-04-02 PROCEDURE — 36415 COLL VENOUS BLD VENIPUNCTURE: CPT

## 2020-04-02 PROCEDURE — 3046F HEMOGLOBIN A1C LEVEL >9.0%: CPT | Performed by: FAMILY MEDICINE

## 2020-04-02 RX ORDER — GUAIFENESIN AND CODEINE PHOSPHATE 100; 10 MG/5ML; MG/5ML
5 SOLUTION ORAL EVERY 4 HOURS PRN
Qty: 120 ML | Refills: 0 | Status: SHIPPED | OUTPATIENT
Start: 2020-04-02 | End: 2020-04-09

## 2020-04-02 RX ORDER — DOXYCYCLINE HYCLATE 100 MG
100 TABLET ORAL 2 TIMES DAILY
Qty: 14 TABLET | Refills: 0 | Status: SHIPPED | OUTPATIENT
Start: 2020-04-02 | End: 2020-04-09

## 2020-04-02 NOTE — PROGRESS NOTES
SUBJECTIVE:  Nathanael Crowder is a 52 y.o. y/o male that presents with Follow-up (cough, temp this am 99.5 , body aches , chest congestion , SOB ) and Chest Pain (and  light headed  sees  Marcellus Madrigal)  . HPI:  Patient follow up from flu clinic. Was seen on 3/30. Dx'd with acute bronchitis. Symptoms have been present for 10 day(s). Since being seen in the clinic, his respiratory sx are about the same. Yesterday he did start to have some chest tightness. Per his chart, he does have a history of chronic chest pain. EKG wnl today. VSS. Describes chest pain as a consistent pain at rest or with activity. No LE edema, palpitations. Pain does hurt more if he does cough. Notes that his blood sugars have been very very high over the past week. Has had multiple readings >500. Last a1c was 6.7. On a basal bolus regimen with jardiance. No polyphagia, poly dipsia, polyuria. EKG - Rhythm - sinus, Rate - 70, intervals wnl, no acute ST segment changes      Fever? No  Runny nose or congestion? Yes   Cough? Yes - dry, harsh cough  Sore throat? No  Headache, fatigue, joint pains, muscle aches? Yes - headaches  Shortness of breath/Wheezing? Yes - both  Nausea/Vomiting/Diarrhea? Yes - vomited once in the past 3 days, has chronic nausea  Double Sickening? No  Sick contacts? Yes  Known COVID exposures of RFs? Potentially, wife is a nurse in [place] [address], she has similar sx  Smoker? No  Preexisting Respiratory conditions?   No      Past Medical History:   Diagnosis Date    Abnormal thyroid biopsy     Acid reflux     Anemia     previously seen by Dr. Corina Hinson (due to enlarged spleen)    B[facility] disorder ([facility] [address] [address]     Blood clot in vein 4/7/16    Malrena Cea     Cancer [facility]) 04/2019    thyroid    Chest pain     previously seeing [place] cardiologist, now seeing Dr. Betty Goldmann (LAD bridging on cath 4/2016)    Chronic back pain     Chronic bronchitis ([address]     Dr. Lennie Beavers 10/2012 - no longer following with him    Colon polyp 08/30/2016    Depression     Dr. Nneka Welch DVT (deep venous thrombosis) ([facility] [address] [id number]    not on Coumadin due to unable to regulate - Dr. Gerri Siegel - no etiology found per patient    Esophageal abnormality     nodule     Fatty liver disease, nonalcoholic     [address] [facility]    Furuncle     legs    History of Doppler ultrasound 5-29-11    No hemodynamically significant carotid stenosis is identified. A thyroid nodule on each side. Dedicated ultrasound of thyroid gland is suggested to further evaluate if clinically indicated.      Hx of blood clots     Hyperlipidemia     severely elevated triglycerides    Hypertension     diastolic    Intracranial arachnoid cyst 11/2012    Dr. Carmella Atkinson drained, complicated with seizures, DKA    Irritable bowel syndrome     Liver disease     Sudha Roman - elevated LFT - positive smooth muscle antibody, steatosis per liver bx 3/2014 with Dr. Rajendra Carranza (multiple drug resistant organisms) resistance 2012    MRSA (methicillin resistant staph aureus) culture positive     h/o in foot and before brain surgery    Nephrolithiasis     noted on CT abdomen 9/2016, 6/2019    Neuropathy     Pancreatic insufficiency     Positive SANDY (antinuclear antibody)     Dr. Izaiah Foster - first visit in 6/2013    Pulmonary embolism ([facility] [address] 2007    s/p GFF, related to knee surgery    Restless legs syndrome     Seizures ([address]     Sinus tachycardia     Holter 3/2013 - seeing Dr. Brittaney Alexander Skull fracture [facility])     clips     Sleep apnea     multiple MD's suggested testing but he refuses to be tested as claustraphobic and won't use Cpap    Type II or unspecified type diabetes mellitus without mention of complication, not stated as uncontrolled 2007       Social History     Socioeconomic History    Marital status:      Spouse name: Not on file    Number of children: 3    Years of education: Not on file    Highest education level: Not on file   Occupational History    Occupation: worsening of symptoms  -Patient counseled on conservative care including fluids, rest and OTC meds    Graham Archuleta received counseling on the following healthy behaviors: medication adherence  Reviewed prior labs and health maintenance. Continue current medications, diet and exercise. Discussed use, benefit, and side effects of prescribed medications. Barriers to medication compliance addressed. Patient given educational materials - see patient instructions. All patient questions answered. Patient voiced understanding. I have reviewed this patient's history, habits, and medication list and have updated the chart where appropriate.

## 2020-04-03 ENCOUNTER — TELEPHONE (OUTPATIENT)
Dept: PRIMARY CARE CLINIC | Age: 50
End: 2020-04-03

## 2020-05-28 ENCOUNTER — TELEPHONE (OUTPATIENT)
Dept: INTERNAL MEDICINE CLINIC | Age: 50
End: 2020-05-28

## 2020-06-16 ENCOUNTER — CARE COORDINATION (OUTPATIENT)
Dept: CARE COORDINATION | Age: 50
End: 2020-06-16

## 2020-06-16 NOTE — CARE COORDINATION
Patient called asking about assistance on his insulin. I had to remind him that I got him approved for this year. He was mad because he hasn't gotten a refill, but he never called to get one either. I will reach out to Estuardo Cano to send over new scripts to [facility].         Nikia Hines [facility]  [address] [facility]   Medication Assistance  [facility] and [employer]    (I)[phone]  (P)[phone]

## 2020-06-19 RX ORDER — INSULIN LISPRO 100 [IU]/ML
INJECTION, SOLUTION INTRAVENOUS; SUBCUTANEOUS
Qty: 8 PEN | Refills: 3 | Status: SHIPPED | OUTPATIENT
Start: 2020-06-19 | End: 2021-01-07 | Stop reason: DRUGHIGH

## 2020-06-26 ENCOUNTER — HOSPITAL ENCOUNTER (EMERGENCY)
Age: 50
Discharge: HOME | End: 2020-06-26
Payer: MEDICARE

## 2020-06-26 VITALS
TEMPERATURE: 97.7 F | DIASTOLIC BLOOD PRESSURE: 78 MMHG | WEIGHT: 247 LBS | HEART RATE: 62 BPM | OXYGEN SATURATION: 95 % | RESPIRATION RATE: 18 BRPM | SYSTOLIC BLOOD PRESSURE: 127 MMHG | HEIGHT: 73 IN | BODY MASS INDEX: 32.74 KG/M2

## 2020-06-26 LAB
ALBUMIN SERPL-MCNC: 4.3 G/DL (ref 3.5–5.1)
ALLEN TEST: POSITIVE
ALP BLD-CCNC: 84 U/L (ref 38–126)
ALT SERPL-CCNC: 21 U/L (ref 11–66)
ANION GAP SERPL CALCULATED.3IONS-SCNC: 13 MEQ/L (ref 8–16)
AST SERPL-CCNC: 15 U/L (ref 5–40)
BASE EXCESS (CALCULATED): 0 MMOL/L (ref -2.5–2.5)
BASOPHILS # BLD: 0.3 %
BASOPHILS ABSOLUTE: 0 THOU/MM3 (ref 0–0.1)
BETA-HYDROXYBUTYRATE: 9.11 MG/DL (ref 0.2–2.81)
BILIRUB SERPL-MCNC: 2 MG/DL (ref 0.3–1.2)
BILIRUBIN URINE: NEGATIVE
BLOOD, URINE: NEGATIVE
BUN BLDV-MCNC: 5 MG/DL (ref 7–22)
CALCIUM SERPL-MCNC: 8.9 MG/DL (ref 8.5–10.5)
CHARACTER, URINE: CLEAR
CHLORIDE BLD-SCNC: 101 MEQ/L (ref 98–111)
CO2: 26 MEQ/L (ref 23–33)
COLLECTED BY:: NORMAL
COLOR: YELLOW
CREAT SERPL-MCNC: 0.4 MG/DL (ref 0.4–1.2)
DEVICE: NORMAL
EKG ATRIAL RATE: 70 BPM
EKG P AXIS: 50 DEGREES
EKG P-R INTERVAL: 190 MS
EKG Q-T INTERVAL: 384 MS
EKG QRS DURATION: 94 MS
EKG QTC CALCULATION (BAZETT): 414 MS
EKG R AXIS: 13 DEGREES
EKG T AXIS: 17 DEGREES
EKG VENTRICULAR RATE: 70 BPM
EOSINOPHIL # BLD: 0.3 %
EOSINOPHILS ABSOLUTE: 0 THOU/MM3 (ref 0–0.4)
ERYTHROCYTE [DISTWIDTH] IN BLOOD BY AUTOMATED COUNT: 12.7 % (ref 11.5–14.5)
ERYTHROCYTE [DISTWIDTH] IN BLOOD BY AUTOMATED COUNT: 38.8 FL (ref 35–45)
GFR SERPL CREATININE-BSD FRML MDRD: > 90 ML/MIN/1.73M2
GLUCOSE BLD-MCNC: 270 MG/DL (ref 70–108)
GLUCOSE BLD-MCNC: 288 MG/DL (ref 70–108)
GLUCOSE BLD-MCNC: 327 MG/DL (ref 70–108)
GLUCOSE BLD-MCNC: 352 MG/DL (ref 70–108)
GLUCOSE BLD-MCNC: 593 MG/DL (ref 70–108)
GLUCOSE URINE: >= 1000 MG/DL
HCO3: 25 MMOL/L (ref 23–28)
HCT VFR BLD CALC: 45.5 % (ref 42–52)
HEMOGLOBIN: 16.6 GM/DL (ref 14–18)
IMMATURE GRANS (ABS): 0.05 THOU/MM3 (ref 0–0.07)
IMMATURE GRANULOCYTES: 0.8 %
KETONES, URINE: ABNORMAL
LEUKOCYTE ESTERASE, URINE: NEGATIVE
LIPASE: 17.8 U/L (ref 5.6–51.3)
LYMPHOCYTES # BLD: 15 %
LYMPHOCYTES ABSOLUTE: 1 THOU/MM3 (ref 1–4.8)
MCH RBC QN AUTO: 30.9 PG (ref 26–33)
MCHC RBC AUTO-ENTMCNC: 36.5 GM/DL (ref 32.2–35.5)
MCV RBC AUTO: 84.7 FL (ref 80–94)
MONOCYTES # BLD: 3.9 %
MONOCYTES ABSOLUTE: 0.3 THOU/MM3 (ref 0.4–1.3)
NITRITE, URINE: NEGATIVE
NUCLEATED RED BLOOD CELLS: 0 /100 WBC
O2 SATURATION: 94 %
OSMOLALITY CALCULATION: 290.7 MOSMOL/KG (ref 275–300)
PCO2: 40 MMHG (ref 35–45)
PH BLOOD GAS: 7.4 (ref 7.35–7.45)
PH UA: 6.5 (ref 5–9)
PLATELET # BLD: 169 THOU/MM3 (ref 130–400)
PMV BLD AUTO: 10.7 FL (ref 9.4–12.4)
PO2: 71 MMHG (ref 71–104)
POTASSIUM REFLEX MAGNESIUM: 3.7 MEQ/L (ref 3.5–5.2)
PRO-BNP: 59.5 PG/ML (ref 0–900)
PROTEIN UA: NEGATIVE
RBC # BLD: 5.37 MILL/MM3 (ref 4.7–6.1)
SEG NEUTROPHILS: 79.7 %
SEGMENTED NEUTROPHILS ABSOLUTE COUNT: 5.3 THOU/MM3 (ref 1.8–7.7)
SODIUM BLD-SCNC: 140 MEQ/L (ref 135–145)
SOURCE, BLOOD GAS: NORMAL
SPECIFIC GRAVITY, URINE: > 1.03 (ref 1–1.03)
TOTAL PROTEIN: 6.5 G/DL (ref 6.1–8)
TROPONIN T: < 0.01 NG/ML
UROBILINOGEN, URINE: 0.2 EU/DL (ref 0–1)
WBC # BLD: 6.6 THOU/MM3 (ref 4.8–10.8)

## 2020-06-26 PROCEDURE — 96374 THER/PROPH/DIAG INJ IV PUSH: CPT

## 2020-06-26 PROCEDURE — 80053 COMPREHEN METABOLIC PANEL: CPT

## 2020-06-26 PROCEDURE — 94761 N-INVAS EAR/PLS OXIMETRY MLT: CPT

## 2020-06-26 PROCEDURE — 96375 TX/PRO/DX INJ NEW DRUG ADDON: CPT

## 2020-06-26 PROCEDURE — 82010 KETONE BODYS QUAN: CPT

## 2020-06-26 PROCEDURE — 82803 BLOOD GASES ANY COMBINATION: CPT

## 2020-06-26 PROCEDURE — 85025 COMPLETE CBC W/AUTO DIFF WBC: CPT

## 2020-06-26 PROCEDURE — 93010 ELECTROCARDIOGRAM REPORT: CPT | Performed by: INTERNAL MEDICINE

## 2020-06-26 PROCEDURE — 81003 URINALYSIS AUTO W/O SCOPE: CPT

## 2020-06-26 PROCEDURE — C9113 INJ PANTOPRAZOLE SODIUM, VIA: HCPCS | Performed by: PHYSICIAN ASSISTANT

## 2020-06-26 PROCEDURE — 84484 ASSAY OF TROPONIN QUANT: CPT

## 2020-06-26 PROCEDURE — 6370000000 HC RX 637 (ALT 250 FOR IP): Performed by: PHYSICIAN ASSISTANT

## 2020-06-26 PROCEDURE — 82948 REAGENT STRIP/BLOOD GLUCOSE: CPT

## 2020-06-26 PROCEDURE — 36600 WITHDRAWAL OF ARTERIAL BLOOD: CPT

## 2020-06-26 PROCEDURE — 2580000003 HC RX 258: Performed by: PHYSICIAN ASSISTANT

## 2020-06-26 PROCEDURE — 93005 ELECTROCARDIOGRAM TRACING: CPT | Performed by: PHYSICIAN ASSISTANT

## 2020-06-26 PROCEDURE — 83690 ASSAY OF LIPASE: CPT

## 2020-06-26 PROCEDURE — 99284 EMERGENCY DEPT VISIT MOD MDM: CPT

## 2020-06-26 PROCEDURE — 96372 THER/PROPH/DIAG INJ SC/IM: CPT

## 2020-06-26 PROCEDURE — 83880 ASSAY OF NATRIURETIC PEPTIDE: CPT

## 2020-06-26 PROCEDURE — 6360000002 HC RX W HCPCS: Performed by: PHYSICIAN ASSISTANT

## 2020-06-26 PROCEDURE — 36415 COLL VENOUS BLD VENIPUNCTURE: CPT

## 2020-06-26 RX ORDER — PANTOPRAZOLE SODIUM 40 MG/10ML
40 INJECTION, POWDER, LYOPHILIZED, FOR SOLUTION INTRAVENOUS ONCE
Status: COMPLETED | OUTPATIENT
Start: 2020-06-26 | End: 2020-06-26

## 2020-06-26 RX ORDER — ONDANSETRON 2 MG/ML
4 INJECTION INTRAMUSCULAR; INTRAVENOUS ONCE
Status: COMPLETED | OUTPATIENT
Start: 2020-06-26 | End: 2020-06-26

## 2020-06-26 RX ORDER — PROMETHAZINE HYDROCHLORIDE 25 MG/ML
25 INJECTION, SOLUTION INTRAMUSCULAR; INTRAVENOUS ONCE
Status: COMPLETED | OUTPATIENT
Start: 2020-06-26 | End: 2020-06-26

## 2020-06-26 RX ORDER — DEXTROSE MONOHYDRATE 25 G/50ML
12.5 INJECTION, SOLUTION INTRAVENOUS PRN
Status: DISCONTINUED | OUTPATIENT
Start: 2020-06-26 | End: 2020-06-26

## 2020-06-26 RX ORDER — DIVALPROEX SODIUM 500 MG/1
500 TABLET, DELAYED RELEASE ORAL ONCE
Status: COMPLETED | OUTPATIENT
Start: 2020-06-26 | End: 2020-06-26

## 2020-06-26 RX ORDER — 0.9 % SODIUM CHLORIDE 0.9 %
1000 INTRAVENOUS SOLUTION INTRAVENOUS ONCE
Status: COMPLETED | OUTPATIENT
Start: 2020-06-26 | End: 2020-06-26

## 2020-06-26 RX ORDER — LEVETIRACETAM 500 MG/1
2000 TABLET ORAL ONCE
Status: COMPLETED | OUTPATIENT
Start: 2020-06-26 | End: 2020-06-26

## 2020-06-26 RX ORDER — 0.9 % SODIUM CHLORIDE 0.9 %
1000 INTRAVENOUS SOLUTION INTRAVENOUS ONCE
Status: DISCONTINUED | OUTPATIENT
Start: 2020-06-26 | End: 2020-06-26 | Stop reason: HOSPADM

## 2020-06-26 RX ADMIN — INSULIN HUMAN 10 UNITS: 100 INJECTION, SOLUTION PARENTERAL at 15:43

## 2020-06-26 RX ADMIN — LEVETIRACETAM 2000 MG: 500 TABLET, FILM COATED ORAL at 15:49

## 2020-06-26 RX ADMIN — SODIUM CHLORIDE 1000 ML: 9 INJECTION, SOLUTION INTRAVENOUS at 18:17

## 2020-06-26 RX ADMIN — PANTOPRAZOLE SODIUM 40 MG: 40 INJECTION, POWDER, FOR SOLUTION INTRAVENOUS at 14:25

## 2020-06-26 RX ADMIN — SODIUM CHLORIDE 1000 ML: 9 INJECTION, SOLUTION INTRAVENOUS at 14:00

## 2020-06-26 RX ADMIN — PROMETHAZINE HYDROCHLORIDE 25 MG: 25 INJECTION INTRAMUSCULAR; INTRAVENOUS at 15:34

## 2020-06-26 RX ADMIN — ONDANSETRON 4 MG: 2 INJECTION INTRAMUSCULAR; INTRAVENOUS at 14:26

## 2020-06-26 RX ADMIN — DIVALPROEX SODIUM 500 MG: 500 TABLET, DELAYED RELEASE ORAL at 15:49

## 2020-06-26 ASSESSMENT — ENCOUNTER SYMPTOMS
COUGH: 0
SHORTNESS OF BREATH: 0
NAUSEA: 1
ABDOMINAL PAIN: 1
COLOR CHANGE: 0
BACK PAIN: 0
VOMITING: 1
SORE THROAT: 0
DIARRHEA: 1
BLOOD IN STOOL: 0

## 2020-06-26 NOTE — ED PROVIDER NOTES
[facility]  eMERGENCY dEPARTMENT eNCOUnter          [address] [facility]       Chief Complaint   Patient presents with    Emesis    Dizziness       Nurses Notes reviewed and I agree except as noted inthe HPI. HISTORY OF PRESENT ILLNESS    Arin Villegas is a 48 y.o. male who presents to the Emergency Department for the evaluation of vomiting and dizziness. Patient states that his symptoms began around 8 PM yesterday without any known trigger. He states that he has had episodes of emesis too numerous to count that initially started off as gastric contents that are now bilious and nonbloody. He has associated headache with the symptoms and yesterday did experience some numbness and tingling like he has in the past prior to seizures but did not have any seizure activity. Due to the nausea and vomiting, he was unable to tolerate his evening medications yesterday or his morning medications today including his insulin but states he was compliant with all of his medications prior to the onset of the symptoms. He has some Phenergan but was unable to keep it down so he came in for evaluation. He denies any alcohol, drug use or dietary changes and denies any known sick contacts. He reports chronic diarrhea as well as chronic, unchanged abdominal pain due to history of IBS. He denies any fevers, chills, chest pain, shortness of breath, urinary changes. He has had similar symptoms in the past from DKA but he reports that his symptoms are not quite as severe. He reports glucose has been in the 300s to 500s over the past few days without known cause, patient states that it just does this. The HPI was provided by the patient. REVIEW OF SYSTEMS     Review of Systems   Constitutional: Negative for chills and fever. HENT: Negative for sore throat. Eyes: Negative for visual disturbance. Respiratory: Negative for cough and shortness of breath. Cardiovascular: Negative for chest pain. Gastrointestinal: Positive for abdominal pain, diarrhea, nausea and vomiting. Negative for blood in stool. Genitourinary: Negative for dysuria. Musculoskeletal: Negative for back pain. Skin: Negative for color change. Neurological: Positive for dizziness, light-headedness and headaches. Negative for syncope. PAST MEDICAL HISTORY    has a past medical history of Abnormal thyroid biopsy, Acid reflux, Anemia, Bipolar disorder ([address] Blood clot in vein, Cancer (HCC), Chest pain, Chronic back pain, Chronic bronchitis ([address] Colon polyp, Depression, DVT (deep venous thrombosis) ([address] Esophageal abnormality, Fatty liver disease, nonalcoholic, Furuncle, History of Doppler ultrasound, Hx of blood clots, Hyperlipidemia, Hypertension, Intracranial arachnoid cyst, Irritable bowel syndrome, Liver disease, MDRO (multiple drug resistant organisms) resistance, MRSA (methicillin resistant staph aureus) culture positive, Nephrolithiasis, Neuropathy, Pancreatic insufficiency, Positive SANDY (antinuclear antibody), Pulmonary embolism ([facility]), Restless legs syndrome, Seizures ([address] Sinus tachycardia, Skull fracture ([address] Sleep apnea, and Type II or unspecified type diabetes mellitus without mention of complication, not stated as uncontrolled. SURGICAL HISTORY      has a past surgical history that includes knee surgery (Left, 2007); Vena Cava Filter Placement (2007); hernia repair (Right, 1996); Cholecystectomy (2004); Upper gastrointestinal endoscopy (2012); transthoracic echocardiogram (3-05-11); other surgical history (5-31-11); Cardiac catheterization; Cardiac catheterization (3-2012); craniotomy (11/2012); Tonsillectomy; Endoscopy, colon, diagnostic; EKG 12 Lead (10/18/2015); Knee arthroscopy (Right, 2016); Cardiac catheterization (04/28/2016); Upper gastrointestinal endoscopy (2016); Inguinal hernia repair (Right, 09/15/2016); Colonoscopy (2012);  Colonoscopy (08/2016); other surgical history (01/20/2017); shoulder

## 2020-06-29 ENCOUNTER — TELEPHONE (OUTPATIENT)
Dept: INTERNAL MEDICINE CLINIC | Age: 50
End: 2020-06-29

## 2020-06-29 NOTE — TELEPHONE ENCOUNTER
Contacted patient and he is unable to come tomorrow at the 10:30 opening . Patient does not want to see a resident he only wants to see you . Patient is scheduled for 8/17/20.

## 2020-07-07 ENCOUNTER — CARE COORDINATION (OUTPATIENT)
Dept: CARE COORDINATION | Age: 50
End: 2020-07-07

## 2020-07-07 NOTE — CARE COORDINATION
I called Ginna Salomon to check on the status of his shipment of insulin. They had all of his paper work but have not sent his insulin to him yet. I pushed that through and it will be delivered on Thursday. I tried to call him to give him a heads up and he hung up on my call.       Abad Alvarez [facility]  [address] [facility]   Medication Assistance  [address] [place] [address] and [employer]    O)[phone] (L)[phone]

## 2020-07-14 ENCOUNTER — CARE COORDINATION (OUTPATIENT)
Dept: CARE COORDINATION | Age: 50
End: 2020-07-14

## 2020-08-17 ENCOUNTER — OFFICE VISIT (OUTPATIENT)
Dept: INTERNAL MEDICINE CLINIC | Age: 50
End: 2020-08-17
Payer: MEDICARE

## 2020-08-17 VITALS
TEMPERATURE: 98.1 F | DIASTOLIC BLOOD PRESSURE: 80 MMHG | BODY MASS INDEX: 28.93 KG/M2 | WEIGHT: 225.4 LBS | SYSTOLIC BLOOD PRESSURE: 118 MMHG | HEART RATE: 82 BPM | HEIGHT: 74 IN

## 2020-08-17 LAB — HBA1C MFR BLD: 9.6 % (ref 4.3–5.7)

## 2020-08-17 PROCEDURE — 2022F DILAT RTA XM EVC RTNOPTHY: CPT | Performed by: INTERNAL MEDICINE

## 2020-08-17 PROCEDURE — 1036F TOBACCO NON-USER: CPT | Performed by: INTERNAL MEDICINE

## 2020-08-17 PROCEDURE — G8417 CALC BMI ABV UP PARAM F/U: HCPCS | Performed by: INTERNAL MEDICINE

## 2020-08-17 PROCEDURE — 3017F COLORECTAL CA SCREEN DOC REV: CPT | Performed by: INTERNAL MEDICINE

## 2020-08-17 PROCEDURE — 99214 OFFICE O/P EST MOD 30 MIN: CPT | Performed by: INTERNAL MEDICINE

## 2020-08-17 PROCEDURE — G8427 DOCREV CUR MEDS BY ELIG CLIN: HCPCS | Performed by: INTERNAL MEDICINE

## 2020-08-17 PROCEDURE — 83036 HEMOGLOBIN GLYCOSYLATED A1C: CPT | Performed by: INTERNAL MEDICINE

## 2020-08-17 PROCEDURE — 3046F HEMOGLOBIN A1C LEVEL >9.0%: CPT | Performed by: INTERNAL MEDICINE

## 2020-08-17 RX ORDER — ERGOCALCIFEROL 1.25 MG/1
50000 CAPSULE ORAL WEEKLY
Qty: 12 CAPSULE | Refills: 1 | Status: SHIPPED | OUTPATIENT
Start: 2020-08-17 | End: 2021-03-08 | Stop reason: SDUPTHER

## 2020-08-17 RX ORDER — GABAPENTIN 600 MG/1
600 TABLET ORAL 4 TIMES DAILY
COMMUNITY
End: 2020-08-17 | Stop reason: SDUPTHER

## 2020-08-17 RX ORDER — ROPINIROLE 1 MG/1
TABLET, FILM COATED ORAL
Qty: 270 TABLET | Refills: 1 | Status: SHIPPED | OUTPATIENT
Start: 2020-08-17 | End: 2020-12-22 | Stop reason: SDUPTHER

## 2020-08-17 RX ORDER — FENOFIBRATE 145 MG/1
145 TABLET, COATED ORAL DAILY
Qty: 90 TABLET | Refills: 1 | Status: ON HOLD | OUTPATIENT
Start: 2020-08-17 | End: 2021-01-22 | Stop reason: HOSPADM

## 2020-08-17 RX ORDER — ATORVASTATIN CALCIUM 10 MG/1
TABLET, FILM COATED ORAL
Qty: 90 TABLET | Refills: 1 | Status: ON HOLD | OUTPATIENT
Start: 2020-08-17 | End: 2021-01-22 | Stop reason: HOSPADM

## 2020-08-17 RX ORDER — GABAPENTIN 600 MG/1
600 TABLET ORAL 4 TIMES DAILY
Qty: 360 TABLET | Refills: 1 | Status: SHIPPED | OUTPATIENT
Start: 2020-08-17 | End: 2021-01-07 | Stop reason: SDUPTHER

## 2020-08-17 RX ORDER — METOPROLOL TARTRATE 50 MG/1
75 TABLET, FILM COATED ORAL 2 TIMES DAILY
Qty: 270 TABLET | Refills: 1 | Status: ON HOLD | OUTPATIENT
Start: 2020-08-17 | End: 2021-08-04 | Stop reason: HOSPADM

## 2020-08-17 RX ORDER — THIAMINE HCL 100 MG
2500 TABLET ORAL DAILY
Qty: 90 TABLET | Refills: 1 | Status: SHIPPED | OUTPATIENT
Start: 2020-08-17 | End: 2021-03-02

## 2020-08-17 NOTE — PROGRESS NOTES
1970     Chart was closed without totally completing note - completed within 24 hours. Chief Complaint   Patient presents with    Diabetes     3 months  / ER 6/26/20 emesis , dizziness     Knee Pain     bilateral    Nausea & Vomiting    Other     vitamin B12 and D def. , neuropathy    Other     elevated urinary cortisol    Other     thyroid nodule with atypical cells       Pt is a 48 y.o. male who presents for follow up visit. He moved 3 weeks ago. Now has daughter living with them with 2 young kids (baby just came home yesterday - on road for 5 weeks in [facility]). He has custody of one grandchild, and babysitting the 3rd daughter's 2 kids and one on the way -due at end of August 2020. DM - uncontrolled, FSBS 200-500's - not eating well, better last 10 days after moving/going to [facility]. HgA1c 9.6 8/2020. He is currently taking 75 Units Levemir BID, Humalog 30 Units plus SSI with every meal, but only eating once a day. He is taking Jardiance 25 mg daily. Increase to 80 Units BID. No metformin due to nausea. Couldn't afford Trulicity. Still having issue with stomach pain, nausea/vomiting. He still has not followed up with GI yet. Having trouble getting time away from kids to make appointments as he is only care giver of kids. He did go to hospital for N/V and told had borderline DKA. He will make appointment when ready. Bilateral knee pain - wants to see [facility] ortho group - but needs to get FSBS controlled. Will not be able to handle surgery at this point. B12 low -he had been on injection but expensive, now on oral the last one year and level is low normal again. Need to know dose he is taking and will check Intrinsic factor. IF negative. Repeat level now. Vitamin D deficiency - Changed vitamin D OTC to 50,000 IU as level was 9 2/2020. Repeat level now.     24 hour urine cortisol high 2/2020 - need salivary cortisol result - patient states it was done at the same time - will have Holly call lab to look for test result. He had abnormal thyroid bx of left thyroid nodule x 2 in 1/2019 and 4/2019-   FINAL RESULTS:    ATYPIA OF UNDETERMINED SIGNIFICANCE.              Follicular cells, predominantly benign appearing, with            focal cytologic atypia. He was referred to ENT and he never went. He is now will to do ultrasound and see ENT. Reviewed labs with him. Past Medical History:   Diagnosis Date    Abnormal thyroid biopsy     Acid reflux     Anemia     previously seen by Dr. Milady Ott (due to enlarged spleen)    Bipolar disorder ([address]     Blood clot in vein 4/7/16    [facility]) 04/2019    thyroid    Chest pain     [facility], now seeing Dr. Cherrie Sheppard (LAD bridging on cath 4/2016)    Chronic back pain     Chronic bronchitis ([facility] [address]     Dr. Thena Schlatter 10/2012 - no longer following with him    Colon polyp 08/30/2016    Depression     Dr. Lonny Connelly DVT (deep venous thrombosis) ([facility] [address] [id number]    not on Coumadin due to unable to regulate - Dr. Sharlyn Essex - no etiology found per patient    Esophageal abnormality     nodule     Fatty liver disease, nonalcoholic     U/S 37/[facility]    Furuncle     legs    History of Doppler ultrasound 5-29-11    No hemodynamically significant carotid stenosis is identified. A thyroid nodule on each side. Dedicated ultrasound of thyroid gland is suggested to further evaluate if clinically indicated.      History of pulmonary embolism 2007    s/p GFF, related to knee surgery    Hx of blood clots     Hyperlipidemia     severely elevated triglycerides    Hypertension     diastolic    Intracranial arachnoid cyst 11/2012    Dr. Mae Seth drained, complicated with seizures, DKA    Irritable bowel syndrome     Liver disease     Doree Kelly - elevated LFT - positive smooth muscle antibody, steatosis per liver bx 3/2014 with Dr. Maximilian Power (multiple drug resistant organisms) resistance 2012    MRSA (methicillin resistant staph aureus) culture positive     h/o in foot and before brain surgery    Nephrolithiasis     noted on CT abdomen 9/2016, 6/2019    Neuropathy     Pancreatic insufficiency     Positive SANDY (antinuclear antibody)     Dr. Denisa Jordan - first visit in 6/2013    Restless legs syndrome     Seizures ([address]     Sinus tachycardia     Holter 3/2013 - seeing Dr. Belle Manuel Skull fracture [facility])     clips     Sleep apnea     multiple MD's suggested testing but he refuses to be tested as claustraphobic and won't use Cpap    Type II or unspecified type diabetes mellitus without mention of complication, not stated as uncontrolled 2007       Current Outpatient Medications   Medication Sig Dispense Refill    Cyanocobalamin (VITAMIN B-12) 2500 MCG SUBL Place 1 tablet under the tongue daily 90 tablet 1    gabapentin (NEURONTIN) 600 MG tablet Take 1 tablet by mouth 4 times daily for 90 days. 360 tablet 1    rOPINIRole (REQUIP) 1 MG tablet TAKE 1 TABLET BY MOUTH THREE TIMES A  tablet 1    fenofibrate (TRICOR) 145 MG tablet Take 1 tablet by mouth daily 90 tablet 1    atorvastatin (LIPITOR) 10 MG tablet TAKE 1 TABLET BY MOUTH ONE TIME A DAY 90 tablet 1    vitamin D (ERGOCALCIFEROL) 1.25 MG (47588 UT) CAPS capsule Take 1 capsule by mouth once a week 12 capsule 1    metoprolol tartrate (LOPRESSOR) 50 MG tablet Take 1.5 tablets by mouth 2 times daily 270 tablet 1    blood glucose test strips (ASCENSIA AUTODISC VI;ONE TOUCH ULTRA TEST VI) strip Test blood sugars 3 times daily DX:E11.40 100 each 3    insulin glargine (LANTUS;BASAGLAR) 100 UNIT/ML injection pen 75 Units Qam, 55 Units Qpm (Patient taking differently: 75 Units Qam, 75 Units Qpm) 11 pen 3    insulin lispro, 1 Unit Dial, (HUMALOG KWIKPEN) 100 UNIT/ML SOPN Inject 30 Units TID before meals plus SSI.  (Patient taking differently: Inject 30 Units TID before meals plus SS 2) 8 pen 3    blood glucose monitor kit and supplies Test 3 times a day dx:E11.40 1 kit 0    empagliflozin (JARDIANCE) 25 MG tablet Take 25 mg by mouth daily 42 tablet 0    promethazine (PHENERGAN) 12.5 MG tablet Take 1-2 tablets by mouth every 8 hours as needed for Nausea 60 tablet 2    pantoprazole (PROTONIX) 40 MG tablet Take 1 tablet by mouth 2 times daily 180 tablet 1    Melatonin 10 MG TABS Take 30 mg by mouth nightly       DULoxetine (CYMBALTA) 60 MG extended release capsule Take 120 mg by mouth daily       lacosamide (VIMPAT) 100 MG TABS tablet Take 300 mg by mouth 2 times daily.  sildenafil (VIAGRA) 100 MG tablet Take 1 tablet by mouth as needed for Erectile Dysfunction 45 tablet 3    divalproex (DEPAKOTE) 500 MG DR tablet Take 500 mg by mouth 2 times daily       Blood Glucose Monitoring Suppl (ONE TOUCH BASIC SYSTEM) W/DEVICE KIT Use to test blood glucose level 4 times per day. Diagnosis: E11.40 1 kit 0    clindamycin (CLEOCIN T) 1 % external solution Apply topically 2 times daily. 30 mL 5    levETIRAcetam (KEPPRA) 500 MG tablet Take 2,000 mg by mouth 2 times daily       LORazepam (ATIVAN) 0.5 MG tablet Take 0.5 mg by mouth 2 times daily as needed (seizures)       zonisamide (ZONEGRAN) 100 MG capsule Take 500 mg by mouth nightly        No current facility-administered medications for this visit.         Allergies   Allergen Reactions    Ciprofloxacin-Ciproflox Hcl Er Other (See Comments)     Elevated creatinine    Heparin      Monitor for thrombocytopenia    Metformin Nausea Only     Abdominal pain, diarrhea    Niacin And Related Other (See Comments)     Burning sensation, severe flushing    Tramadol Hcl      Contraindication to seizure medications    Ultram [Tramadol]      Contraindication to seizure medications    Warfarin      Very difficult to regulate in past       Social History     Socioeconomic History    Marital status:      Spouse name: Not on file    Number of children: 3    Years of education: Not on file    Highest Vitamin B12 & Folate   5. Vitamin D deficiency  vitamin D (ERGOCALCIFEROL) 1.25 MG (54400 UT) CAPS capsule    Vitamin D 25 Hydroxy   6. Restless legs syndrome (RLS)  rOPINIRole (REQUIP) 1 MG tablet   7. Mixed hyperlipidemia  fenofibrate (TRICOR) 145 MG tablet    atorvastatin (LIPITOR) 10 MG tablet   8. Sinus tachycardia  metoprolol tartrate (LOPRESSOR) 50 MG tablet   9. Hypertension associated with diabetes ([facility] [address]     10. Chronic pain of both knees     11. Recurrent major depressive disorder, in full remission ([facility] [address]     12. Hyperammonemia ([facility] [address]     13. Atherosclerotic heart disease of native coronary artery with other forms of angina pectoris ([facility] [address]       DM - uncontrolled, due to his noncompliance with diet - only eating once a day - again tried to get him back on scheule. Increase basal insulin to 80 Units daily as all FSBS high. Need to take insulin even if just eating small snack. Trying to see if has Cushing syndrome as urine cortisol is a little high - want salivary cortisol level but I don't see it in computer at time of urine cortisol testing - will follow up on that. N/V - most likely due to uncontrolled DM. Check amylase and lipase. Thyroid nodule on left - 2 bx with atypical cells - now ready after more than a year to have this followed up. Order ultrasound and follow up with ENT. Vitamin B12 and vitamin D deficiency -on increased Vitamin D  50,000 IU weekly and OTC B12 - check levels. RLS- this paired with neuropathy is causing significant pain. I really have nothing more to offer. Mixed hyperlipidemia- LDL 37, HDL 29 and trig 334 12/2019 - LDL at goal on Lipitor, trig still high on fenofibrate. Work on DM control. Sinus tachycardia/HTN - controlled, continue metoprolol. Chronic knee pain - wants to see [facility] but not a surgical candidate with his DM so out of control.     Depression - stable, no SI but stress with social situation - following with psychiatrist.    Hyperammonemia - not currently an issue. Atherosclerotic heart disease - bridging of LAD - follows with Dr. Collin Funes. Follow up visit in 3 months. Labs due now. Carissa Edwards MD    [facility] PROFESSIONAL SERV[facility]  PHYSICIANS [facility]. [place]  [facility] [address]  [address]  [place] [address]  Dept: [phone]  Dept Fax: 89 : [phone]

## 2020-08-19 ENCOUNTER — TELEPHONE (OUTPATIENT)
Dept: INTERNAL MEDICINE CLINIC | Age: 50
End: 2020-08-19

## 2020-08-19 NOTE — TELEPHONE ENCOUNTER
Patient called stating he canceled thyroid US for 8/19 as he was under the impression that he was to get a thyroid biopsy . Note states US and see ENT. Please advise.

## 2020-08-20 NOTE — TELEPHONE ENCOUNTER
Per verbal order Dr. Chuyita Crawford she ordered the thyroid US as the two previous biopsies were insufficient tissue collection . She was trying to save him a stick  And order an US to see if there is any change in size .

## 2020-08-25 NOTE — TELEPHONE ENCOUNTER
Patient states he is okay to get just the thyroid US and will contact central scheduling at the hospital to get rescheduled.

## 2020-09-01 ENCOUNTER — NURSE ONLY (OUTPATIENT)
Dept: LAB | Age: 50
End: 2020-09-01

## 2020-09-01 LAB
AMYLASE: 29 U/L (ref 20–104)
ANION GAP SERPL CALCULATED.3IONS-SCNC: 13 MEQ/L (ref 8–16)
BUN BLDV-MCNC: 9 MG/DL (ref 7–22)
CALCIUM SERPL-MCNC: 9.7 MG/DL (ref 8.5–10.5)
CHLORIDE BLD-SCNC: 97 MEQ/L (ref 98–111)
CO2: 24 MEQ/L (ref 23–33)
CREAT SERPL-MCNC: 0.5 MG/DL (ref 0.4–1.2)
FOLATE: 11.6 NG/ML (ref 4.8–24.2)
GFR SERPL CREATININE-BSD FRML MDRD: > 90 ML/MIN/1.73M2
GLUCOSE BLD-MCNC: 465 MG/DL (ref 70–108)
LIPASE: 42.3 U/L (ref 5.6–51.3)
POTASSIUM SERPL-SCNC: 4.3 MEQ/L (ref 3.5–5.2)
SODIUM BLD-SCNC: 134 MEQ/L (ref 135–145)
TSH SERPL DL<=0.05 MIU/L-ACNC: 1.14 UIU/ML (ref 0.4–4.2)
VITAMIN B-12: 273 PG/ML (ref 211–911)
VITAMIN D 25-HYDROXY: 16 NG/ML (ref 30–100)

## 2020-09-02 ENCOUNTER — TELEPHONE (OUTPATIENT)
Dept: INTERNAL MEDICINE CLINIC | Age: 50
End: 2020-09-02

## 2020-09-02 NOTE — TELEPHONE ENCOUNTER
Spoke with lab and they did not collect the salivary cortisol . The tech could not find any documentation as to why it was not collected.

## 2020-09-15 ENCOUNTER — HOSPITAL ENCOUNTER (OUTPATIENT)
Dept: ULTRASOUND IMAGING | Age: 50
Discharge: HOME | End: 2020-09-15
Payer: MEDICARE

## 2020-09-15 PROCEDURE — 76536 US EXAM OF HEAD AND NECK: CPT

## 2020-09-16 ENCOUNTER — TELEPHONE (OUTPATIENT)
Dept: INTERNAL MEDICINE CLINIC | Age: 50
End: 2020-09-16

## 2020-09-16 NOTE — TELEPHONE ENCOUNTER
----- Message from Kiya Garcia MD sent at 9/16/2020  4:08 PM EDT -----  Let him know looks like low suspicion for malignancy on ultrasound - no further biopsy needed!

## 2020-09-21 ENCOUNTER — OFFICE VISIT (OUTPATIENT)
Dept: ENT CLINIC | Age: 50
End: 2020-09-21
Payer: MEDICARE

## 2020-09-21 ENCOUNTER — NURSE ONLY (OUTPATIENT)
Dept: LAB | Age: 50
End: 2020-09-21

## 2020-09-21 ENCOUNTER — TELEPHONE (OUTPATIENT)
Dept: INTERNAL MEDICINE CLINIC | Age: 50
End: 2020-09-21

## 2020-09-21 VITALS
DIASTOLIC BLOOD PRESSURE: 80 MMHG | HEART RATE: 90 BPM | SYSTOLIC BLOOD PRESSURE: 118 MMHG | HEIGHT: 74 IN | TEMPERATURE: 98.9 F | WEIGHT: 228 LBS | RESPIRATION RATE: 14 BRPM | BODY MASS INDEX: 29.26 KG/M2

## 2020-09-21 PROBLEM — R74.02 ELEVATED LEVELS OF TRANSAMINASE & LACTIC ACID DEHYDROGENASE: Status: ACTIVE | Noted: 2020-09-21

## 2020-09-21 PROBLEM — S93.601A SPRAIN OF RIGHT FOOT: Status: ACTIVE | Noted: 2020-09-21

## 2020-09-21 PROBLEM — M51.36 OTHER INTERVERTEBRAL DISC DEGENERATION, LUMBAR REGION: Status: ACTIVE | Noted: 2020-09-21

## 2020-09-21 PROBLEM — M94.261 CHONDROMALACIA OF KNEE, RIGHT: Status: ACTIVE | Noted: 2020-09-21

## 2020-09-21 PROBLEM — M51.369 OTHER INTERVERTEBRAL DISC DEGENERATION, LUMBAR REGION: Status: ACTIVE | Noted: 2020-09-21

## 2020-09-21 PROBLEM — R74.01 ELEVATED LEVELS OF TRANSAMINASE & LACTIC ACID DEHYDROGENASE: Status: ACTIVE | Noted: 2020-09-21

## 2020-09-21 PROBLEM — M25.462 EFFUSION OF LEFT KNEE: Status: ACTIVE | Noted: 2020-09-21

## 2020-09-21 PROBLEM — R16.1 SPLENOMEGALY: Status: ACTIVE | Noted: 2020-09-21

## 2020-09-21 PROBLEM — K75.81 STEATOHEPATITIS: Status: ACTIVE | Noted: 2020-09-21

## 2020-09-21 PROCEDURE — 1036F TOBACCO NON-USER: CPT | Performed by: OTOLARYNGOLOGY

## 2020-09-21 PROCEDURE — 99205 OFFICE O/P NEW HI 60 MIN: CPT | Performed by: OTOLARYNGOLOGY

## 2020-09-21 PROCEDURE — 3046F HEMOGLOBIN A1C LEVEL >9.0%: CPT | Performed by: OTOLARYNGOLOGY

## 2020-09-21 PROCEDURE — 2022F DILAT RTA XM EVC RTNOPTHY: CPT | Performed by: OTOLARYNGOLOGY

## 2020-09-21 PROCEDURE — G8417 CALC BMI ABV UP PARAM F/U: HCPCS | Performed by: OTOLARYNGOLOGY

## 2020-09-21 PROCEDURE — 3017F COLORECTAL CA SCREEN DOC REV: CPT | Performed by: OTOLARYNGOLOGY

## 2020-09-21 PROCEDURE — G8427 DOCREV CUR MEDS BY ELIG CLIN: HCPCS | Performed by: OTOLARYNGOLOGY

## 2020-09-21 NOTE — TELEPHONE ENCOUNTER
Jenny from [facility] called in saying pt stopped in for a salivary test.  He did not have an order. I advised the lab the last one that was order was over 3year old.    They can not take that order and so I put another order in and faxed to the lab

## 2020-09-21 NOTE — TELEPHONE ENCOUNTER
Pt asking when he had his thyroid bx. I advised him 1/2019 and 4/2019. He states he saw Dr. Azar Torres and he is ordering other tests. Pt will get back to Dr. Azar Torres.

## 2020-09-21 NOTE — PROGRESS NOTES
[facility]  [address] [place] [address]  [address] [address]  Dept: [phone]  Dept Fax: [phone]  Loc: [phone]    Pedro Rollins is a 48 y.o. male who was referred by Mica Arevalo MD for:  Chief Complaint   Patient presents with    New Patient     New patient is here for thyroid nodule referred by Carissa Edwards MD.       HPI:     Pedro Rollins is a 48 y.o. male ostensibly referred for evaluation and/or treatment for a thyroid nodule that was since considered low suspicion for malignancy. The fine-needle aspiration in 2019 described a significant number of atypical follicular cells in a background of benign appearing follicular cells. He and his primary care clinician are both inclined to getting him a thyroidectomy to definitively determine the nature of this pathology in his thyroid gland. His wife is a nurse who also feels the same way about his thyroid disease. 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 He states that a lot of his medical problems began after a left knee reconstruction with \"cadaver tissue\". He states that he has little lost approximately 70 pounds of excess weight over the last year intentionally. He admits to having at least 30 more pounds to go but having lost his desire to further weight loss. He considers himself chronically ill. He is disabled because of a seizure disorder that started after arachnoid cyst resection for reasons that were not well understood. He is on 4 antiseizure medications. He is described as having complex and cluster seizures and has them upwards of 3 times per week. Sometimes they occur at night. He has a severe vitamin D deficiency and is on replacement. He has been told he has obstructive sleep apnea based on his deep desaturations at night when he has been in the hospital for other reasons. He has never had a sleep study. He assumes he would not tolerate CPAP because of an uncontrollable claustrophobia. History: Allergies   Allergen Reactions    Ciprofloxacin-Ciproflox Hcl Er Other (See Comments)     Elevated creatinine    Heparin      Monitor for thrombocytopenia    Metformin Nausea Only     Abdominal pain, diarrhea    Niacin And Related Other (See Comments)     Burning sensation, severe flushing    Tramadol Hcl      Contraindication to seizure medications    Ultram [Tramadol]      Contraindication to seizure medications    Warfarin      Very difficult to regulate in past     Current Outpatient Medications   Medication Sig Dispense Refill    Cyanocobalamin (VITAMIN B-12) 2500 MCG SUBL Place 1 tablet under the tongue daily 90 tablet 1    gabapentin (NEURONTIN) 600 MG tablet Take 1 tablet by mouth 4 times daily for 90 days. 360 tablet 1    rOPINIRole (REQUIP) 1 MG tablet TAKE 1 TABLET BY MOUTH THREE TIMES A  tablet 1    fenofibrate (TRICOR) 145 MG tablet Take 1 tablet by mouth daily 90 tablet 1    atorvastatin (LIPITOR) 10 MG tablet TAKE 1 TABLET BY MOUTH ONE TIME A DAY 90 tablet 1    vitamin D (ERGOCALCIFEROL) 1.25 MG (95787 UT) CAPS capsule Take 1 capsule by mouth once a week 12 capsule 1    metoprolol tartrate (LOPRESSOR) 50 MG tablet Take 1.5 tablets by mouth 2 times daily 270 tablet 1    blood glucose test strips (ASCENSIA AUTODISC VI;ONE TOUCH ULTRA TEST VI) strip Test blood sugars 3 times daily DX:E11.40 100 each 3    insulin glargine (LANTUS;BASAGLAR) 100 UNIT/ML injection pen 75 Units Qam, 55 Units Qpm (Patient taking differently: 75 Units Qam, 75 Units Qpm) 11 pen 3    insulin lispro, 1 Unit Dial, (HUMALOG KWIKPEN) 100 UNIT/ML SOPN Inject 30 Units TID before meals plus SSI.  (Patient taking differently: Inject 30 Units TID before meals plus SS 2) 8 pen 3    blood glucose monitor kit and supplies Test 3 times a day dx:E11.40 1 kit 0    empagliflozin (Pedro Rothman) 25 MG tablet Take 25 mg by mouth daily 42 tablet 0    promethazine (PHENERGAN) 12.5 MG tablet Take 1-2 tablets by mouth every 8 hours as needed for Nausea 60 tablet 2    pantoprazole (PROTONIX) 40 MG tablet Take 1 tablet by mouth 2 times daily 180 tablet 1    Melatonin 10 MG TABS Take 30 mg by mouth nightly       DULoxetine (CYMBALTA) 60 MG extended release capsule Take 120 mg by mouth daily       lacosamide (VIMPAT) 100 MG TABS tablet Take 300 mg by mouth 2 times daily.  sildenafil (VIAGRA) 100 MG tablet Take 1 tablet by mouth as needed for Erectile Dysfunction 45 tablet 3    divalproex (DEPAKOTE) 500 MG DR tablet Take 500 mg by mouth 2 times daily       Blood Glucose Monitoring Suppl (ONE TOUCH BASIC SYSTEM) W/DEVICE KIT Use to test blood glucose level 4 times per day. Diagnosis: E11.40 1 kit 0    clindamycin (CLEOCIN T) 1 % external solution Apply topically 2 times daily. 30 mL 5    levETIRAcetam (KEPPRA) 500 MG tablet Take 2,000 mg by mouth 2 times daily       LORazepam (ATIVAN) 0.5 MG tablet Take 0.5 mg by mouth 2 times daily as needed (seizures)       zonisamide (ZONEGRAN) 100 MG capsule Take 500 mg by mouth nightly        No current facility-administered medications for this visit.       Past Medical History:   Diagnosis Date    Abnormal thyroid biopsy     Acid reflux     Anemia     previously seen by Dr. Nancy Finney (due to enlarged spleen)    Bipolar disorder ([facility]     Blood clot in vein 4/7/16    [place]     Cancer [facility]) 04/2019    thyroid    Chest pain     previously seeing [facility] cardiologist, now seeing Dr. Hilario Banks (LAD bridging on cath 4/2016)    Chronic back pain     Chronic bronchitis ([facility]     Dr. Jose Luis Atkinson 10/2012 - no longer following with him    Colon polyp 08/30/2016    Depression     Dr. Claudean Matin DVT (deep venous thrombosis) ([facility] [id number]    not on Coumadin due to unable to regulate - Dr. Vlad Parks - no etiology found per patient    Esophageal abnormality     nodule     Fatty liver disease, nonalcoholic     U/S [id number]/7968 [facility]    Furuncle     legs    History of Doppler ultrasound 5-29-11    No hemodynamically significant carotid stenosis is identified. A thyroid nodule on each side. Dedicated ultrasound of thyroid gland is suggested to further evaluate if clinically indicated.      History of pulmonary embolism 2007    s/p GFF, related to knee surgery    Hx of blood clots     Hyperlipidemia     severely elevated triglycerides    Hypertension     diastolic    Intracranial arachnoid cyst 11/2012    Dr. Matthew Nolasco drained, complicated with seizures, DKA    Irritable bowel syndrome     Liver disease     Ashley An - elevated LFT - positive smooth muscle antibody, steatosis per liver bx 3/2014 with Dr. Catherine Xavier (multiple drug resistant organisms) resistance 2012    MRSA (methicillin resistant staph aureus) culture positive     h/o in foot and before brain surgery    Nephrolithiasis     noted on CT abdomen 9/2016, 6/2019    Neuropathy     Pancreatic insufficiency     Positive SANDY (antinuclear antibody)     Dr. Kaye Berry - first visit in 6/2013    Restless legs syndrome     Seizures ([address]     Sinus tachycardia     Holter 3/2013 - seeing Dr. Jeanine Garvey fracture [facility])     clips     Sleep apnea     multiple MD's suggested testing but he refuses to be tested as claustraphobic and won't use Cpap    Type II or unspecified type diabetes mellitus without mention of complication, not stated as uncontrolled 2007      Past Surgical History:   Procedure Laterality Date    CARDIAC CATHETERIZATION      2011,5-6 years ago   [address]  3-2012   [address]  04/28/2016    [facility]     CARPAL TUNNEL RELEASE  01/2017    CHOLECYSTECTOMY  2004    COLONOSCOPY  2012    COLONOSCOPY  08/2016    2 tubular adenomas - reportedly needs repeat scope in 6 months    CRANIOTOMY  11/2012    arachnoid cyst drainage    EKG 12-LEAD  10/18/2015         ENDOSCOPY, COLON, DIAGNOSTIC      HERNIA REPAIR Right 1996    [facility]--Dr. Benja Marie    INGUINAL HERNIA REPAIR Right 09/15/2016    Robotic assisted    JOINT REPLACEMENT      KNEE ARTHROSCOPY Right 2016    KNEE SURGERY Left 2007    acl and debrided twice    OTHER SURGICAL HISTORY  5-31-11    Tilt table was associated w/ nonspecific symptoms or nausea, otherwise no significant dizziness or syncope. No significant hemodynamic changes. Otherwise, unremarkable tilt table test after 30 minutes of tilting.  OTHER SURGICAL HISTORY  01/20/2017    RIGHT SHOULDER ARTHROSCOPY, OPEN STAN, OPEN ACROMIOPLASTY, BICEP TENDESIS, RIGHT CARPAL TUNNEL RELEASE    SHOULDER SURGERY Right 01/2007    TONSILLECTOMY      TRANSTHORACIC ECHOCARDIOGRAM  3-05-11    LV size and systolic function normal. EF 55-65%.  UPPER GASTROINTESTINAL ENDOSCOPY  2012    UPPER GASTROINTESTINAL ENDOSCOPY  2016    Dr. Chilo Sheppard Left 10/22/2019    [facility]    EGD BIOPSY performed by Dalton [facility] MD [facility]   [facility]  2007     Family History   Problem Relation Age of Onset    Heart Disease Father 61        MI    Stroke Brother     Obesity Brother     Arthritis Mother     Seizures Mother     Obesity Sister     Other Brother         pulmonary embolism    Diabetes Daughter     Seizures Brother      Social History     Tobacco Use    Smoking status: Never Smoker    Smokeless tobacco: Never Used   Substance Use Topics    Alcohol use: No     Alcohol/week: 0.0 standard drinks        Subjective:      Review of Systems  Rest of review of systems are negative, except as noted in HPI.      Objective:     /80 (Site: Left Upper Arm, Position: Sitting)   Pulse 90   Temp 98.9 °F (37.2 °C) (Infrared)   Resp 14   Ht 6' 2\" (1.88 m)   Wt 228 lb (103.4 kg)   BMI 29.27 kg/m²     Physical Exam     On general physical exam the patient is a pleasant somewhat plethoric adult male who appears markedly older than his stated age. He has an ill appearance about him. His voice is abnormal and that its moderately low in pitch but otherwise clear in character for his age and gender. His speech pattern is within normal limits for his age and gender. I heard no throat clearing coughing or inspiratory stridor. His nasal exam was abnormal for the presence of a narrow nasal inlet and inferior turbinate hypertrophy. His level of septal deviation was hard to determine. His oropharynx and oral cavity exam was abnormal for the presence of a class III-IV Mallampati aperture. His soft palate was long and draping and his nasopharyngeal aperture was narrow. His maxilla was edentulous. His mandible had numerous fractured and carious teeth that were approximately at the level of his mucosa. His tongue base was high. His neck was abnormal for the presence of a diffusely and markedly tender thyroid gland. He had a palpable nodule on the left lobe that was subtle. I detected no adenopathy. I heard no bruits on auscultation of his carotid bulbs. His lungs were clear to auscultation. He had no CVA tenderness. His heart had a regular rate and rhythm without murmur or gallop. His abdomen was protuberant and soft as well as nontender with normal bowel sounds. His lower extremities were abnormal for the presence of 1-2+ pitting edema to his lower calf. Vitals reviewed. Us Thyroid    Result Date: 9/15/2020  Stable nodules bilaterally. Based on the American Thyroid Association criteria, this has a low suspicion for malignancy. **This report has been created using voice recognition software. It may contain minor errors which are inherent in voice recognition technology. ** Final report electronically signed by Dr. Mariana Massey on 9/15/2020 5:44 PM     Lab Results   Component Value Date     09/01/2020  06/26/2020     04/02/2020    K 4.3 09/01/2020    K 3.7 06/26/2020    K 3.9 04/02/2020    K 3.5 09/09/2019    K 3.9 04/15/2018    CL 97 09/01/2020     06/26/2020     04/02/2020    CO2 24 09/01/2020    CO2 26 06/26/2020    CO2 26 04/02/2020    BUN 9 09/01/2020    BUN 5 06/26/2020    BUN 7 04/02/2020    CREATININE 0.5 09/01/2020    CREATININE 0.4 06/26/2020    CREATININE 0.5 04/02/2020    CALCIUM 9.7 09/01/2020    CALCIUM 8.9 06/26/2020    CALCIUM 9.1 04/02/2020    PROT 6.5 06/26/2020    PROT 7.1 08/12/2019    PROT 7.3 08/12/2019    LABALBU 4.3 06/26/2020    LABALBU 4.2 08/12/2019    LABALBU 4.4 08/12/2019    LABALBU 4.3 03/23/2012    LABALBU 3.7 01/06/2012    LABALBU 4.1 01/05/2012    BILITOT 2.0 06/26/2020    BILITOT 1.9 08/12/2019    BILITOT 3.0 08/12/2019    ALKPHOS 84 06/26/2020    ALKPHOS 91 08/12/2019    ALKPHOS 97 08/12/2019    AST 15 06/26/2020    AST 15 08/12/2019    AST 14 08/12/2019    ALT 21 06/26/2020    ALT 21 08/12/2019    ALT 23 08/12/2019       All of the past medical history, past surgical history, family history,social history, allergies and current medications were reviewed with the patient. Assessment & Plan   Diagnoses and all orders for this visit:     Diagnosis Orders   1. Thyroiditis, chronic  Thyroid Antibodies   2. Thyroid nodule, uninodular  Thyroid Antibodies    NM THYROID SCAN   3. Obstructive sleep apnea  [address] [place] [address]   4. Class 3 severe obesity with serious comorbidity in adult, unspecified BMI, unspecified obesity type ([facility] [address] [address]  [address] [place] [address]   5. Type 2 diabetes mellitus with diabetic neuropathy, with long-term current use of insulin ([facility] [address]     6. Seizure disorder [facility])  [address] [place] [address]         Based on the patient's history and these physical exams as well as his laboratory evaluation of his thyroid dysfunction and his left lobe nodule, the patient has a spectrum of problems that may be related.   He appears to have chronic thyroiditis which itself may be a smoldering autoimmune disease for which I will get thyroid antibodies. In addition having a persistent follicular lesion and cancer concerns, is a sufficient indication to consider a hemithyroidectomy in this patient to determine definitively if this is a follicular thyroid cancer or not. The patient more importantly has untreated obstructive sleep apnea and a seizure disorder. While his seizures manifested only after his brain surgery, and therefore are probably not primarily a manifestation of his sleep apnea, is highly disordered breathing and therefore highly disordered sleep patterns are likely contributing to his seizure disorder by lowering his seizure threshold. In addition efforts at helping him sleep better by the prescription of sedating medications as well as the sedation associated with most antiseizure medicines, likely make his desaturations deeper and can promote his seizures occurring from hypoxemia. Based on this I recommended that we get a sleep study. The patient is very concerned about having it at the sleep center since there are no \"clinicians\" in these centers should he have a seizure in the middle of his sleep study. I agreed that could be an issue but when he is at home his wife is on her own to take care of him should he have a problem of that nature. She is a nurse and therefore clinically trained but I would think there are significant limits to what she could do to intervene apart from calling 911. Still getting the information as to how deeply he desaturates and his overall sleep architecture may be possible to get from a home sleep study although it will not be nearly as complete an information set if he were to have the sleep study in the lab.     In general terms I recommended the patient that he work on losing the additional 30+ pounds that he still holds in his abdomen to try to further improve his health status and drop his hemoglobin A1c from where it is now which is nearly 10. Whenever he has to undergo to treat his thyroid and his obstructive sleep apnea will be made easier and safer for him if he loses his extra weight. I answered his questions and addressed his concerns. He reported being pleased with the outcome of the visit and being willing to proceed as such. See him in approximately 6 weeks for follow-up of these diagnostic studies and plan further care as indicated. If his studies have not been completed by then, I have requested that the patient postpone the visit appropriately. Return in about 6 weeks (around 11/2/2020). **This report has been created using voice recognition software. It may contain minor errors which are inherent in voice recognition technology. **      October 13, 2020    The patient's nuclear medicine evaluation had minimal detail about the iodine uptake but showed no evidence of metastatic disease that was iodine avid. He may need a specific iodine uptake study to determine if the follicular lesion of his left hemithyroid is hot or cold. We will discuss with radiology.     PFC

## 2020-09-23 ENCOUNTER — APPOINTMENT (OUTPATIENT)
Dept: NUCLEAR MEDICINE | Age: 50
End: 2020-09-23
Payer: MEDICARE

## 2020-09-23 ENCOUNTER — HOSPITAL ENCOUNTER (OUTPATIENT)
Dept: NUCLEAR MEDICINE | Age: 50
Discharge: HOME | End: 2020-09-23
Payer: MEDICARE

## 2020-09-23 LAB
THYROGLOBULIN AB: 0.9 IU/ML (ref 0–4)
THYROID PEROXIDASE ANTIBODY: 0.8 IU/ML (ref 0–9)

## 2020-09-23 PROCEDURE — 78018 THYROID MET IMAGING BODY: CPT

## 2020-09-23 PROCEDURE — A9509 IODINE I-123 SOD IODIDE MIL: HCPCS | Performed by: OTOLARYNGOLOGY

## 2020-09-23 PROCEDURE — 3430000000 HC RX DIAGNOSTIC RADIOPHARMACEUTICAL: Performed by: OTOLARYNGOLOGY

## 2020-09-23 RX ADMIN — Medication 2.4 MILLICURIE: at 10:45

## 2020-09-24 ENCOUNTER — HOSPITAL ENCOUNTER (OUTPATIENT)
Dept: NUCLEAR MEDICINE | Age: 50
Discharge: HOME | End: 2020-09-24
Payer: MEDICARE

## 2020-09-25 ENCOUNTER — HOSPITAL ENCOUNTER (OUTPATIENT)
Dept: NUCLEAR MEDICINE | Age: 50
Discharge: HOME | End: 2020-09-25
Payer: MEDICARE
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
## 2020-10-07 ENCOUNTER — INITIAL CONSULT (OUTPATIENT)
Dept: PULMONOLOGY | Age: 50
End: 2020-10-07
Payer: MEDICARE

## 2020-10-07 VITALS
OXYGEN SATURATION: 97 % | TEMPERATURE: 98.8 F | HEIGHT: 74 IN | BODY MASS INDEX: 29.65 KG/M2 | DIASTOLIC BLOOD PRESSURE: 78 MMHG | SYSTOLIC BLOOD PRESSURE: 122 MMHG | HEART RATE: 95 BPM | WEIGHT: 231 LBS

## 2020-10-07 PROCEDURE — G8417 CALC BMI ABV UP PARAM F/U: HCPCS | Performed by: INTERNAL MEDICINE

## 2020-10-07 PROCEDURE — G8427 DOCREV CUR MEDS BY ELIG CLIN: HCPCS | Performed by: INTERNAL MEDICINE

## 2020-10-07 PROCEDURE — G8484 FLU IMMUNIZE NO ADMIN: HCPCS | Performed by: INTERNAL MEDICINE

## 2020-10-07 PROCEDURE — 99215 OFFICE O/P EST HI 40 MIN: CPT | Performed by: INTERNAL MEDICINE

## 2020-10-07 PROCEDURE — 1036F TOBACCO NON-USER: CPT | Performed by: INTERNAL MEDICINE

## 2020-10-07 PROCEDURE — 3017F COLORECTAL CA SCREEN DOC REV: CPT | Performed by: INTERNAL MEDICINE

## 2020-10-07 NOTE — PATIENT INSTRUCTIONS
Recommendations/Plan:  -Will schedule patient for polysomnogram in the sleep lab with seizure montage.   -I had a detailed discussion with the patient regarding current indications and limitations along with Pros and Cons for in lab polysomnogram Vs Portable sleep test at home. At the end of the discussion he choose to go for in lab sleep test.  -I had a discussion with patient regarding avialable treatment options for his sleep disorder breathing including but not limited to CPAP titration in the sleep lab Vs.Dental appliance placement with referral to a local dentist Vs other available surgical options including Uvulopalatopharyngoplasty, maxillomandibular ostomy and tracheostomy as last option. At the end of discussion, he decided on UPPP surgery as  his treatment if he found to have obstructive sleep apnea at this time.  -We will see Hallie Carter back in 1week after the sleep study to go over the sleep study results and further management options.  -He was educated to practice good sleep hygiene practices. He was provided with a good sleep hygiene hand out.  -Netta Lauren was advised to make earlier appointment with my clinic if he develops any worsening of sleep symptoms. He verbalizes understanding.  -Netta Ruvalcabaman was advised to not to drive any motor vehicles or operate heavy equipment until his sleep symptoms are under good control. Hallie Carter verbalizes understanding.  -He was advised to loose weight by controlling diet and doing exercise once cleared by his family physician.   - Hallie Carter was educated about my impression and plan. He verbalizes understanding.

## 2020-10-07 NOTE — PROGRESS NOTES
Chief Complaint: Miguelina Mealing is a new sleep consult ref by [facility] [place]    Mallampati airway Class: 3  Neck Circumference:. 17 Inches    Tyler sleepiness score 10/7/20: 4  Saqli:.31

## 2020-10-16 ENCOUNTER — OFFICE VISIT (OUTPATIENT)
Dept: PULMONOLOGY | Age: 50
End: 2020-10-16
Payer: MEDICARE

## 2020-10-16 VITALS
SYSTOLIC BLOOD PRESSURE: 126 MMHG | BODY MASS INDEX: 30.93 KG/M2 | HEART RATE: 76 BPM | DIASTOLIC BLOOD PRESSURE: 74 MMHG | TEMPERATURE: 97.8 F | OXYGEN SATURATION: 98 % | WEIGHT: 241 LBS | HEIGHT: 74 IN

## 2020-10-16 PROCEDURE — 1036F TOBACCO NON-USER: CPT | Performed by: NURSE PRACTITIONER

## 2020-10-16 PROCEDURE — 99213 OFFICE O/P EST LOW 20 MIN: CPT | Performed by: NURSE PRACTITIONER

## 2020-10-16 PROCEDURE — G8417 CALC BMI ABV UP PARAM F/U: HCPCS | Performed by: NURSE PRACTITIONER

## 2020-10-16 PROCEDURE — G8427 DOCREV CUR MEDS BY ELIG CLIN: HCPCS | Performed by: NURSE PRACTITIONER

## 2020-10-16 PROCEDURE — G8484 FLU IMMUNIZE NO ADMIN: HCPCS | Performed by: NURSE PRACTITIONER

## 2020-10-16 PROCEDURE — 3017F COLORECTAL CA SCREEN DOC REV: CPT | Performed by: NURSE PRACTITIONER

## 2020-10-16 ASSESSMENT — ENCOUNTER SYMPTOMS
CHEST TIGHTNESS: 0
VOMITING: 0
SHORTNESS OF BREATH: 1
STRIDOR: 0
DIARRHEA: 0
COUGH: 0
WHEEZING: 0
NAUSEA: 0

## 2020-10-16 NOTE — PROGRESS NOTES
[facility]    Anny De Leon, 48 y.o.  [id number]      Pt of [facility]   Nurses Notes   Glenn Crawford is here for a sleep study follow up   Study Results  Initial Study Date -  10/12/20  AHI -  7    TotalEvents - 48  (Apneas  0  Hypopneas 48  Central  0)  LM w/Arousals - 3  Sleep Efficiency - 94 % (Total Sleep Time - 411.5 min)  Time with Sats below 88% - 9.6 min  Ess 2  SAQLI 56  Interval History       Anny De Leon is a 48 y.o. old male who comes in to review the results of his recent sleep study, to answer questions and to explore options for treatment. he continues to have symptoms of excessive daytime sleepiness and disrupted sleep pattern. PMHx  Past Medical History:   Diagnosis Date    Abnormal thyroid biopsy     Acid reflux     Anemia     previously seen by Dr. Vitaliy Nava (due to enlarged spleen)    Bipolar disorder ([facility]     Blood clot in vein 4/7/16    [facility]) 04/2019    thyroid    Chest pain     previously seeing [address] [place] [address] cardiologist, now seeing Dr. Latonya Le (LAD bridging on cath 4/2016)    Chronic back pain     Chronic bronchitis ([facility] [address] [facility]     Dr. Leatha Hernandez 10/2012 - no longer following with him    Colon polyp 08/30/2016    Depression     Dr. Og Donnelly DVT (deep venous thrombosis) ([facility] [address] [id number]    not on Coumadin due to unable to regulate - Dr. Marianela Cummins - no etiology found per patient    Esophageal abnormality     nodule     Fatty liver disease, nonalcoholic     [id number]    Furuncle     legs    History of Doppler ultrasound 5-29-11    No hemodynamically significant carotid stenosis is identified. A thyroid nodule on each side. Dedicated ultrasound of thyroid gland is suggested to further evaluate if clinically indicated.      History of pulmonary embolism 2007    s/p GFF, related to knee surgery    Hx of blood clots     Hyperlipidemia     severely elevated triglycerides    Hypertension     diastolic    Intracranial arachnoid 01/20/2017    RIGHT SHOULDER ARTHROSCOPY, OPEN STAN, OPEN ACROMIOPLASTY, BICEP TENDESIS, RIGHT CARPAL TUNNEL RELEASE    SHOULDER SURGERY Right 01/2007    TONSILLECTOMY      TRANSTHORACIC ECHOCARDIOGRAM  3-05-11    LV size and systolic function normal. EF 55-65%.  UPPER GASTROINTESTINAL ENDOSCOPY  2012    UPPER GASTROINTESTINAL ENDOSCOPY  2016    Dr. Deepti Vance Left 10/22/2019    EGD DILATION SAVORY performed by Mila Linda MD at [facility] ENDOSCOPY Left 10/22/2019    EGD BIOPSY performed by Mila Linda MD at [address]  2007     Social History     Tobacco Use    Smoking status: Never Smoker    Smokeless tobacco: Never Used   Substance Use Topics    Alcohol use: No     Alcohol/week: 0.0 standard drinks    Drug use: No     Family History   Problem Relation Age of Onset    Heart Disease Father 61        MI    Stroke Brother     Obesity Brother     Arthritis Mother     Seizures Mother     Obesity Sister     Other Brother         pulmonary embolism    Diabetes Daughter     Seizures Brother      Allergies  Allergies   Allergen Reactions    Ciprofloxacin-Ciproflox Hcl Er Other (See Comments)     Elevated creatinine    Heparin      Monitor for thrombocytopenia    Metformin Nausea Only     Abdominal pain, diarrhea    Niacin And Related Other (See Comments)     Burning sensation, severe flushing    Tramadol Hcl      Contraindication to seizure medications    Ultram [Tramadol]      Contraindication to seizure medications    Warfarin      Very difficult to regulate in past     Meds  Current Outpatient Medications   Medication Sig Dispense Refill    Cyanocobalamin (VITAMIN B-12) 2500 MCG SUBL Place 1 tablet under the tongue daily 90 tablet 1    gabapentin (NEURONTIN) 600 MG tablet Take 1 tablet by mouth 4 times daily for 90 days.  360 tablet 1    rOPINIRole (REQUIP) 1 MG tablet TAKE 1 TABLET BY MOUTH THREE TIMES A  tablet 1    fenofibrate (TRICOR) 145 MG tablet Take 1 tablet by mouth daily 90 tablet 1    atorvastatin (LIPITOR) 10 MG tablet TAKE 1 TABLET BY MOUTH ONE TIME A DAY 90 tablet 1    vitamin D (ERGOCALCIFEROL) 1.25 MG (89852 UT) CAPS capsule Take 1 capsule by mouth once a week 12 capsule 1    metoprolol tartrate (LOPRESSOR) 50 MG tablet Take 1.5 tablets by mouth 2 times daily 270 tablet 1    blood glucose test strips (ASCENSIA AUTODISC VI;ONE TOUCH ULTRA TEST VI) strip Test blood sugars 3 times daily DX:E11.40 100 each 3    insulin glargine (LANTUS;BASAGLAR) 100 UNIT/ML injection pen 75 Units Qam, 55 Units Qpm (Patient taking differently: 75 Units Qam, 75 Units Qpm) 11 pen 3    insulin lispro, 1 Unit Dial, (HUMALOG KWIKPEN) 100 UNIT/ML SOPN Inject 30 Units TID before meals plus SSI. (Patient taking differently: Inject 30 Units TID before meals plus SS 2) 8 pen 3    blood glucose monitor kit and supplies Test 3 times a day dx:E11.40 1 kit 0    empagliflozin (JARDIANCE) 25 MG tablet Take 25 mg by mouth daily 42 tablet 0    promethazine (PHENERGAN) 12.5 MG tablet Take 1-2 tablets by mouth every 8 hours as needed for Nausea 60 tablet 2    pantoprazole (PROTONIX) 40 MG tablet Take 1 tablet by mouth 2 times daily 180 tablet 1    Melatonin 10 MG TABS Take 30 mg by mouth nightly       DULoxetine (CYMBALTA) 60 MG extended release capsule Take 120 mg by mouth daily       lacosamide (VIMPAT) 100 MG TABS tablet Take 300 mg by mouth 2 times daily.  sildenafil (VIAGRA) 100 MG tablet Take 1 tablet by mouth as needed for Erectile Dysfunction 45 tablet 3    divalproex (DEPAKOTE) 500 MG DR tablet Take 500 mg by mouth 2 times daily       Blood Glucose Monitoring Suppl (ONE TOUCH BASIC SYSTEM) W/DEVICE KIT Use to test blood glucose level 4 times per day. Diagnosis: E11.40 1 kit 0    clindamycin (CLEOCIN T) 1 % external solution Apply topically 2 times daily.  30 mL 5    levETIRAcetam (KEPPRA) 500 MG tablet Take 2,000 mg by mouth 2 times daily       LORazepam (ATIVAN) 0.5 MG tablet Take 0.5 mg by mouth 2 times daily as needed (seizures)       zonisamide (ZONEGRAN) 100 MG capsule Take 500 mg by mouth nightly        No current facility-administered medications for this visit. ROS  Review of Systems   Constitutional: Negative for chills, fever and unexpected weight change. Respiratory: Positive for shortness of breath. Negative for cough, chest tightness, wheezing and stridor. Cardiovascular: Positive for chest pain. Negative for leg swelling. Gastrointestinal: Negative for diarrhea, nausea and vomiting. Genitourinary: Negative for dysuria. Exam  Vitals -  /74 (Site: Left Upper Arm, Position: Sitting)   Pulse 76   Temp 97.8 °F (36.6 °C)   Ht 6' 2\" (1.88 m)   Wt 241 lb (109.3 kg)   SpO2 98% Comment: on RA  BMI 30.94 kg/m²    Body mass index is 30.94 kg/m². Oxygen level - Room Air  Physical Exam  Vitals signs and nursing note reviewed. Constitutional:       General: He is not in acute distress. Appearance: He is well-developed. HENT:      Head: Normocephalic and atraumatic. Neck:      Musculoskeletal: Neck supple. Trachea: No tracheal deviation. Cardiovascular:      Rate and Rhythm: Normal rate and regular rhythm. Heart sounds: Normal heart sounds. No murmur. Pulmonary:      Effort: Pulmonary effort is normal. No respiratory distress. Breath sounds: Normal breath sounds. No stridor. No wheezing or rales. Chest:      Chest wall: No tenderness. Abdominal:      General: Bowel sounds are normal. There is no distension. Palpations: Abdomen is soft. Skin:     General: Skin is warm and dry. Capillary Refill: Capillary refill takes less than 2 seconds. Neurological:      Mental Status: He is alert and oriented to person, place, and time.    Psychiatric:         Behavior: Behavior normal.         Thought Content:

## 2020-10-20 ENCOUNTER — OFFICE VISIT (OUTPATIENT)
Dept: ENT CLINIC | Age: 50
End: 2020-10-20
Payer: MEDICARE

## 2020-10-20 VITALS
HEIGHT: 74 IN | HEART RATE: 80 BPM | BODY MASS INDEX: 30.6 KG/M2 | SYSTOLIC BLOOD PRESSURE: 124 MMHG | RESPIRATION RATE: 14 BRPM | TEMPERATURE: 97.7 F | DIASTOLIC BLOOD PRESSURE: 70 MMHG | WEIGHT: 238.4 LBS

## 2020-10-20 PROBLEM — D49.7 FOLLICULAR NEOPLASM OF THYROID: Status: ACTIVE | Noted: 2020-10-20

## 2020-10-20 PROBLEM — M85.80 OSTEOPENIA: Status: ACTIVE | Noted: 2020-10-20

## 2020-10-20 PROBLEM — J34.89 NASAL OBSTRUCTION: Status: ACTIVE | Noted: 2020-10-20

## 2020-10-20 PROBLEM — G47.33 OBSTRUCTIVE SLEEP APNEA: Status: ACTIVE | Noted: 2020-10-20

## 2020-10-20 PROBLEM — J34.2 NASAL SEPTAL DEVIATION: Status: ACTIVE | Noted: 2020-10-20

## 2020-10-20 PROCEDURE — 1036F TOBACCO NON-USER: CPT | Performed by: OTOLARYNGOLOGY

## 2020-10-20 PROCEDURE — 99214 OFFICE O/P EST MOD 30 MIN: CPT | Performed by: OTOLARYNGOLOGY

## 2020-10-20 PROCEDURE — G8417 CALC BMI ABV UP PARAM F/U: HCPCS | Performed by: OTOLARYNGOLOGY

## 2020-10-20 PROCEDURE — G8484 FLU IMMUNIZE NO ADMIN: HCPCS | Performed by: OTOLARYNGOLOGY

## 2020-10-20 PROCEDURE — G8427 DOCREV CUR MEDS BY ELIG CLIN: HCPCS | Performed by: OTOLARYNGOLOGY

## 2020-10-20 PROCEDURE — 3017F COLORECTAL CA SCREEN DOC REV: CPT | Performed by: OTOLARYNGOLOGY

## 2020-10-20 NOTE — PROGRESS NOTES
[address] [place] [facility]  [address] [place] [address]  Dept: [phone]  Dept Fax: [phone]  Loc: [phone]    Hao Talavera is a 48 y.o. male who was referred by No ref. provider found for:  Chief Complaint   Patient presents with    Follow-up     Patient is here for 4 week follow up thyroid       HPI:     Hao Talavera is a 48 y.o. male with a history of a tender follicular nodule of the left hemithyroid and obstructive sleep apnea. He is status post a repeat ultrasound of the left side that demonstrated no change in the nodule. In addition he is status post a nuclear medicine scan looking for any signs of metastatic disease that might help differentiate whether this follicular neoplasm is benign or malignant. It was negative for metastatic disease. He has lost weight which has improved his nighttime breathing disorders but he states that he still startles regularly and has no expectation that he could tolerate CPAP now better than he could previously. His repeat sleep study was positive for sleep apnea of a relatively mild variety particularly when he sleeps on his back. He has nighttime breathing problems through his nose as well. He has a strong desire to have his thyroid problem surgically and definitively treated as well as he desires obstructive sleep apnea surgery for his nighttime breathing problems. He does describe nighttime nasal congestion that variably occurs even during the daytime but is much worse on recumbency. History:      Allergies   Allergen Reactions    Ciprofloxacin-Ciproflox Hcl Er Other (See Comments)     Elevated creatinine    Heparin      Monitor for thrombocytopenia    Metformin Nausea Only     Abdominal pain, diarrhea    Niacin And Related Other (See Comments)     Burning sensation, severe flushing    Tramadol Hcl      Contraindication to seizure medications    Ultram [Tramadol] arachnoid cyst 11/2012    Dr. Claire Delong drained, complicated with seizures, DKA    Irritable bowel syndrome     Liver disease     Dulcy Sera - elevated LFT - positive smooth muscle antibody, steatosis per liver bx 3/2014 with Dr. Cuco Kolb (multiple drug resistant organisms) resistance 2012    MRSA (methicillin resistant staph aureus) culture positive     h/o in foot and before brain surgery    Nephrolithiasis     noted on CT abdomen 9/2016, 6/2019    Neuropathy     Pancreatic insufficiency     Positive SANDY (antinuclear antibody)     Dr. Shani Pascal - first visit in 6/2013    Restless legs syndrome     Seizures ([address]     Sinus tachycardia     Holter 3/2013 - seeing Dr. Staci Kehr [facility])     clips     Sleep apnea     multiple MD's suggested testing but he refuses to be tested as claustraphobic and won't use Cpap    Type II or unspecified type diabetes mellitus without mention of complication, not stated as uncontrolled 2007      Past Surgical History:   Procedure Laterality Date    CARDIAC CATHETERIZATION      2011,5-6 years ago   [address]  3-2012   [address]  04/28/2016    [facility]     CARPAL TUNNEL RELEASE  01/2017    CHOLECYSTECTOMY  2004    COLONOSCOPY  2012    COLONOSCOPY  08/2016    2 tubular adenomas - reportedly needs repeat scope in 6 months    CRANIOTOMY  11/2012    arachnoid cyst drainage    EKG 12-LEAD  10/18/2015         ENDOSCOPY, COLON, DIAGNOSTIC      HERNIA REPAIR Right 1996    [facility]--Dr. Juanpablo Johnson INGUINAL HERNIA REPAIR Right 09/15/2016    Robotic assisted    JOINT REPLACEMENT      KNEE ARTHROSCOPY Right 2016    KNEE SURGERY Left 2007    acl and debrided twice    OTHER SURGICAL HISTORY  5-31-11    Tilt table was associated w/ nonspecific symptoms or nausea, otherwise no significant dizziness or syncope. No significant hemodynamic changes. Otherwise, unremarkable tilt table test after 30 minutes of tilting.      OTHER SURGICAL HISTORY 01/20/2017    RIGHT SHOULDER ARTHROSCOPY, OPEN STAN, OPEN ACROMIOPLASTY, BICEP TENDESIS, RIGHT CARPAL TUNNEL RELEASE    SHOULDER SURGERY Right 01/2007    TONSILLECTOMY      TRANSTHORACIC ECHOCARDIOGRAM  3-05-11    LV size and systolic function normal. EF 55-65%.  UPPER GASTROINTESTINAL ENDOSCOPY  2012    UPPER GASTROINTESTINAL ENDOSCOPY  2016    Dr. Almaz Araya Left 10/22/2019    EGD DILATION SAVORY performed by Joey Rudd MD at [facility] Left 10/22/2019    EGD BIOPSY performed by Joey Rudd MD at [facility]   [facility]  2007     Family History   Problem Relation Age of Onset    Heart Disease Father 61        MI    Stroke Brother     Obesity Brother     Arthritis Mother     Seizures Mother     Obesity Sister     Other Brother         pulmonary embolism    Diabetes Daughter     Seizures Brother      Social History     Tobacco Use    Smoking status: Never Smoker    Smokeless tobacco: Never Used   Substance Use Topics    Alcohol use: No     Alcohol/week: 0.0 standard drinks        Subjective:      Review of Systems  Rest of review of systems are negative, except as noted in HPI. Objective:     /70 (Site: Left Upper Arm, Position: Sitting)   Pulse 80   Temp 97.7 °F (36.5 °C) (Infrared)   Resp 14   Ht 6' 2\" (1.88 m)   Wt 238 lb 6.4 oz (108.1 kg)   BMI 30.61 kg/m²     Physical Exam     On general physical exam the patient is a pleasant somewhat alert and cooperative adult male who appears significantly older than his stated age of 48. His voice is mildly low in pitch but otherwise clear in character for his age and gender. His speech pattern is normal being neither hyper nor hyponasal.  I heard no throat clearing coughing or inspiratory stridor. His oral cavity is abnormal for a class III-IV Mallampati. His lungs were clear to auscultation.   His heart had a regular rate and rhythm without murmur or gallop. Vitals reviewed. Nm Thyroid Metastases Scan Whole Body    Result Date: 9/25/2020  No scintigraphic evidence of iodine avid distant disease. Final report electronically signed by Dr. Trixie Yeh on 9/25/2020 1:01 PM     Lab Results   Component Value Date     09/01/2020     06/26/2020     04/02/2020    K 4.3 09/01/2020    K 3.7 06/26/2020    K 3.9 04/02/2020    K 3.5 09/09/2019    K 3.9 04/15/2018    CL 97 09/01/2020     06/26/2020     04/02/2020    CO2 24 09/01/2020    CO2 26 06/26/2020    CO2 26 04/02/2020    BUN 9 09/01/2020    BUN 5 06/26/2020    BUN 7 04/02/2020    CREATININE 0.5 09/01/2020    CREATININE 0.4 06/26/2020    CREATININE 0.5 04/02/2020    CALCIUM 9.7 09/01/2020    CALCIUM 8.9 06/26/2020    CALCIUM 9.1 04/02/2020    PROT 6.5 06/26/2020    PROT 7.1 08/12/2019    PROT 7.3 08/12/2019    LABALBU 4.3 06/26/2020    LABALBU 4.2 08/12/2019    LABALBU 4.4 08/12/2019    LABALBU 4.3 03/23/2012    LABALBU 3.7 01/06/2012    LABALBU 4.1 01/05/2012    BILITOT 2.0 06/26/2020    BILITOT 1.9 08/12/2019    BILITOT 3.0 08/12/2019    ALKPHOS 84 06/26/2020    ALKPHOS 91 08/12/2019    ALKPHOS 97 08/12/2019    AST 15 06/26/2020    AST 15 08/12/2019    AST 14 08/12/2019    ALT 21 06/26/2020    ALT 21 08/12/2019    ALT 23 08/12/2019       All of the past medical history, past surgical history, family history,social history, allergies and current medications were reviewed with the patient. Assessment & Plan   Diagnoses and all orders for this visit:     Diagnosis Orders   1. Obstructive sleep apnea  MI PALATOPHAYNGOPLASTY   2. Osteopenia, unspecified location  Calcium    PTH, Intact   3. Snoring  MI PALATOPHAYNGOPLASTY    CT FACIAL BONES WO CONTRAST   4. Obesity (BMI 30-39. 9)  MI PALATOPHAYNGOPLASTY   5. Follicular neoplasm of thyroid  MI THYROID LOBECTOMY,UNILAT   6. Nasal obstruction  CT FACIAL BONES WO CONTRAST   7.  Nasal septal deviation  CT FACIAL BONES WO CONTRAST       Based on the patient's history and these physical findings as well as physical findings from his initial exam in September, the patient is a candidate for a uvulopalatopharyngoplasty for his obstructive sleep apnea. His chronic nasal obstruction is likely related to inferior turbinate hypertrophy but he wants 1 degree more of evaluation for this in the form of a CT of his facial bones with IGS protocol to determine if he has extensive need for nasal septal reconstruction or simply could benefit from inferior turbinate reduction. His degree of sleep apnea is now relatively mild since he has lost a significant amount of weight and now has a BMI of 30.6. However he is confident that he is unlikely to lose any more weight and strongly desires to have surgical management of his sleep apnea tendency with the hopes of his snoring will be improved and that he will be able to sleep more effectively. He has a known seizure disorder that is likely worsened by his poor sleeping patterns. Regarding his thyroid disease, he and his wife who is a nurse clinician both would like to see the affected left hemithyroid to be removed and definitively evaluated for possible thyroid cancer. They understand the ultrasound demonstrates a low likelihood that this is true and there are no now that his nuclear medicine study showed no evidence of spread of his thyroid disease which would indicate that there is in fact malignancy in the gland. Still neither the studies exclude thyroid cancer so he desires having the 2 operation scheduled together. I reviewed with him the indications benefits limitations and risks of proceeding in this manner in the context of the possibility that he has a parathyroid adenoma on top of these other problems.   Assuming I have not searching for parathyroid adenoma, the risk to his recurrent laryngeal nerve and the 2 parathyroid glands on that side are part of the risk spectrum particularly if he turns out having thyroid cancer and needs the opposite side surgically treated. Because he has a seizure disorder, he and his wife also would like to have the 2 operations done at the same time to save him another anesthetic and the associated issues with having to be observed for postanesthesia seizures. His sleep study was done with a seizure protocol, for example. The patient reported understanding the indications benefits limitations and risks and wishing to proceed. Informed consent was based on this conversation. I offered to speak with his wife about any of these considerations after clinic one day at her convenience should she desire this. Patient reported understanding these issues and wishing to proceed as such. I spent 30 minutes of face-to-face time with the patient. Return in about 4 weeks (around 11/17/2020) for For 2-week postop visit. **This report has been created using voice recognition software. It may contain minor errors which are inherent in voice recognition technology. **

## 2020-10-21 ENCOUNTER — TELEPHONE (OUTPATIENT)
Dept: ENT CLINIC | Age: 50
End: 2020-10-21

## 2020-10-23 NOTE — TELEPHONE ENCOUNTER
Patient returned phone call to schedule the CT facial bones without contrast. From the patient's office note with Dr. Yamilet Swanson 10/20/20, the patient is to be scheduled for surgery. Please advise.

## 2020-10-28 ENCOUNTER — TELEPHONE (OUTPATIENT)
Dept: CARDIOLOGY CLINIC | Age: 50
End: 2020-10-28

## 2020-10-28 ENCOUNTER — TELEPHONE (OUTPATIENT)
Dept: PULMONOLOGY | Age: 50
End: 2020-10-28

## 2020-10-28 NOTE — TELEPHONE ENCOUNTER
Patient called in wanting pre-op clearance for surgery for sleep apnea and thyroid scheduled 11/27/20. Please advise.

## 2020-10-28 NOTE — TELEPHONE ENCOUNTER
DOLV=05-25-18. Netta Lauren called to make a pre op appt with Dr Nancie Bailey, he is having thyroid surgery 11-27-20 by ani at [address] [place] [address],[address]. Please call him with an appt date and time.

## 2020-10-29 ENCOUNTER — OFFICE VISIT (OUTPATIENT)
Dept: CARDIOLOGY CLINIC | Age: 50
End: 2020-10-29
Payer: MEDICARE

## 2020-10-29 VITALS
DIASTOLIC BLOOD PRESSURE: 79 MMHG | WEIGHT: 240.5 LBS | HEART RATE: 82 BPM | BODY MASS INDEX: 30.87 KG/M2 | SYSTOLIC BLOOD PRESSURE: 128 MMHG | HEIGHT: 74 IN

## 2020-10-29 PROCEDURE — 1036F TOBACCO NON-USER: CPT | Performed by: INTERNAL MEDICINE

## 2020-10-29 PROCEDURE — 99204 OFFICE O/P NEW MOD 45 MIN: CPT | Performed by: INTERNAL MEDICINE

## 2020-10-29 PROCEDURE — G8417 CALC BMI ABV UP PARAM F/U: HCPCS | Performed by: INTERNAL MEDICINE

## 2020-10-29 PROCEDURE — G8484 FLU IMMUNIZE NO ADMIN: HCPCS | Performed by: INTERNAL MEDICINE

## 2020-10-29 PROCEDURE — G8428 CUR MEDS NOT DOCUMENT: HCPCS | Performed by: INTERNAL MEDICINE

## 2020-10-29 PROCEDURE — 3017F COLORECTAL CA SCREEN DOC REV: CPT | Performed by: INTERNAL MEDICINE

## 2020-10-29 NOTE — PROGRESS NOTES
100 UNIT/ML SOPN, Inject 30 Units TID before meals plus SSI. (Patient taking differently: Inject 30 Units TID before meals plus SS 2), Disp: 8 pen, Rfl: 3    blood glucose monitor kit and supplies, Test 3 times a day dx:E11.40, Disp: 1 kit, Rfl: 0    empagliflozin (JARDIANCE) 25 MG tablet, Take 25 mg by mouth daily, Disp: 42 tablet, Rfl: 0    promethazine (PHENERGAN) 12.5 MG tablet, Take 1-2 tablets by mouth every 8 hours as needed for Nausea, Disp: 60 tablet, Rfl: 2    pantoprazole (PROTONIX) 40 MG tablet, Take 1 tablet by mouth 2 times daily, Disp: 180 tablet, Rfl: 1    Melatonin 10 MG TABS, Take 30 mg by mouth nightly , Disp: , Rfl:     DULoxetine (CYMBALTA) 60 MG extended release capsule, Take 120 mg by mouth daily , Disp: , Rfl:     lacosamide (VIMPAT) 100 MG TABS tablet, Take 300 mg by mouth 2 times daily. , Disp: , Rfl:     sildenafil (VIAGRA) 100 MG tablet, Take 1 tablet by mouth as needed for Erectile Dysfunction, Disp: 45 tablet, Rfl: 3    divalproex (DEPAKOTE) 500 MG DR tablet, Take 500 mg by mouth 2 times daily , Disp: , Rfl:     Blood Glucose Monitoring Suppl (ONE TOUCH BASIC SYSTEM) W/DEVICE KIT, Use to test blood glucose level 4 times per day. Diagnosis: E11.40, Disp: 1 kit, Rfl: 0    clindamycin (CLEOCIN T) 1 % external solution, Apply topically 2 times daily. , Disp: 30 mL, Rfl: 5    levETIRAcetam (KEPPRA) 500 MG tablet, Take 2,000 mg by mouth 2 times daily , Disp: , Rfl:     LORazepam (ATIVAN) 0.5 MG tablet, Take 0.5 mg by mouth 2 times daily as needed (seizures) , Disp: , Rfl:     zonisamide (ZONEGRAN) 100 MG capsule, Take 500 mg by mouth nightly , Disp: , Rfl:     Past Medical History  Milka Arevalo  has a past medical history of Abnormal thyroid biopsy, Acid reflux, Anemia, Bipolar disorder ([facility] Blood clot in vein, Cancer ([facility] Chest pain, Chronic back pain, Chronic bronchitis ([facility] Colon polyp, Depression, DVT (deep venous thrombosis) ([facility] Esophageal abnormality, Fatty liver disease, nonalcoholic, Furuncle, History of Doppler ultrasound, History of pulmonary embolism, Hx of blood clots, Hyperlipidemia, Hypertension, Intracranial arachnoid cyst, Irritable bowel syndrome, Liver disease, MDRO (multiple drug resistant organisms) resistance, MRSA (methicillin resistant staph aureus) culture positive, Nephrolithiasis, Neuropathy, Pancreatic insufficiency, Positive SANDY (antinuclear antibody), Restless legs syndrome, Seizures (HCC), Sinus tachycardia, Skull fracture ([address] Sleep apnea, Sleep apnea, and Type II or unspecified type diabetes mellitus without mention of complication, not stated as uncontrolled. Social History  Chandler Farley  reports that he has never smoked. He has never used smokeless tobacco. He reports that he does not drink alcohol or use drugs. Family History  Chandler Farley family history includes Arthritis in his mother; Diabetes in his daughter; Heart Disease (age of onset: 61) in his father; Obesity in his brother and sister; Other in his brother; Seizures in his brother and mother; Stroke in his brother. Past Surgical History   Past Surgical History:   Procedure Laterality Date    CARDIAC CATHETERIZATION      2011,5-6 years ago   [address]  3-2012    CARDIAC CATHETERIZATION  04/28/2016    [facility]     CARPAL TUNNEL RELEASE  01/2017    CHOLECYSTECTOMY  2004    COLONOSCOPY  2012    COLONOSCOPY  08/2016    2 tubular adenomas - reportedly needs repeat scope in 6 months    CRANIOTOMY  11/2012    arachnoid cyst drainage    EKG 12-LEAD  10/18/2015         ENDOSCOPY, COLON, DIAGNOSTIC      HERNIA REPAIR Right 1996    [facility]--Dr. Starla Rivero INGUINAL HERNIA REPAIR Right 09/15/2016    Robotic assisted    JOINT REPLACEMENT      KNEE ARTHROSCOPY Right 2016    KNEE SURGERY Left 2007    acl and debrided twice    OTHER SURGICAL HISTORY  5-31-11    Tilt table was associated w/ nonspecific symptoms or nausea, otherwise no significant dizziness or syncope.  No significant hemodynamic changes. Otherwise, unremarkable tilt table test after 30 minutes of tilting.  OTHER SURGICAL HISTORY  01/20/2017    RIGHT SHOULDER ARTHROSCOPY, OPEN STAN, OPEN ACROMIOPLASTY, BICEP TENDESIS, RIGHT CARPAL TUNNEL RELEASE    SHOULDER SURGERY Right 01/2007    TONSILLECTOMY      TRANSTHORACIC ECHOCARDIOGRAM  3-05-11    LV size and systolic function normal. EF 55-65%.  UPPER GASTROINTESTINAL ENDOSCOPY  2012    UPPER GASTROINTESTINAL ENDOSCOPY  2016    Dr. Maria C Gary Left 10/22/2019    EGD DILATION SAVORY performed by Paige Woods MD at [facility] ENDOSCOPY Left 10/22/2019    EGD BIOPSY performed by Paige Woods MD at [address]  2007       Subjective:     REVIEW OF SYSTEMS  Constitutional: denies sweats, chills and fever  HENT: denies  congestion, sinus pressure, sneezing and sore throat. Eyes: denies  pain, discharge, redness and itching. Respiratory: denies apnea, cough  Gastrointestinal: denies blood in stool, constipation, diarrhea   Endocrine: denies cold intolerance, heat intolerance, polydipsia. Genitourinary: denies dysuria, enuresis, flank pain and hematuria. Musculoskeletal: denies arthralgias, joint swelling and neck pain. Neurological: denies numbness and headaches. Psychiatric/Behavioral: denies agitation, confusion, decreased concentration and dysphoric mood    All others reviewed and are negative. Objective:     BP (!) 143/91   Pulse 80   Ht 6' 2\" (1.88 m)   Wt 240 lb 8 oz (109.1 kg)   BMI 30.88 kg/m²     Wt Readings from Last 3 Encounters:   10/29/20 240 lb 8 oz (109.1 kg)   10/20/20 238 lb 6.4 oz (108.1 kg)   10/16/20 241 lb (109.3 kg)     BP Readings from Last 3 Encounters:   10/29/20 (!) 143/91   10/20/20 124/70   10/16/20 126/74       PHYSICAL EXAM  Constitutional: Oriented to person, place, and time. Appears well-developed and well-nourished.    HENT: Head: Normocephalic and atraumatic. Eyes: EOM are normal. Pupils are equal, round, and reactive to light. Neck: Normal range of motion. Neck supple. No JVD present. Cardiovascular: Normal rate , normal heart sounds and intact distal pulses. Pulmonary/Chest: Effort normal and breath sounds normal. No respiratory distress. No wheezes. No rales. Abdominal: Soft. Bowel sounds are normal. No distension. There is no tenderness. Musculoskeletal: Normal range of motion. No edema. Neurological: Alert and oriented to person, place, and time. No cranial nerve deficit. Coordination normal.   Skin: Skin is warm and dry. Psychiatric: Normal mood and affect.        Lab Results   Component Value Date    CKTOTAL 98 03/21/2019    CKTOTAL 114 06/05/2018    CKTOTAL 59 01/24/2016    CKMB <0.2 01/04/2012       Lab Results   Component Value Date    WBC 6.6 06/26/2020    RBC 5.37 06/26/2020    RBC 4.93 03/26/2012    HGB 16.6 06/26/2020    HCT 45.5 06/26/2020    MCV 84.7 06/26/2020    MCH 30.9 06/26/2020    MCHC 36.5 06/26/2020    RDW 13.5 06/05/2018     06/26/2020    MPV 10.7 06/26/2020       Lab Results   Component Value Date     09/01/2020    K 4.3 09/01/2020    K 3.7 06/26/2020    CL 97 09/01/2020    CO2 24 09/01/2020    BUN 9 09/01/2020    LABALBU 4.3 06/26/2020    LABALBU 4.3 03/23/2012    CREATININE 0.5 09/01/2020    CALCIUM 9.7 09/01/2020    LABGLOM >90 09/01/2020    GLUCOSE 465 09/01/2020    GLUCOSE 189 11/12/2015       Lab Results   Component Value Date    ALKPHOS 84 06/26/2020    ALT 21 06/26/2020    AST 15 06/26/2020    PROT 6.5 06/26/2020    BILITOT 2.0 06/26/2020    BILIDIR 0.4 08/12/2019    LABALBU 4.3 06/26/2020    LABALBU 4.3 03/23/2012       Lab Results   Component Value Date    MG 1.7 08/13/2019       Lab Results   Component Value Date    INR 1.05 06/05/2018    INR 1.01 04/27/2016    INR 1.02 04/12/2016         Lab Results   Component Value Date    LABA1C 9.6 08/17/2020    LABA1C 10.2 08/12/2019       Lab Results   Component Value Date    TRIG 334 12/26/2019    HDL 29 12/26/2019    LDLCALC 37 12/26/2019    LDLDIRECT 97.60 08/21/2014       Lab Results   Component Value Date    TSH 1.140 09/01/2020         Testing Reviewed:      I haveindividually reviewed the below cardiac tests    EKG:    ECHO:   Results for orders placed during the hospital encounter of 09/19/19   Echocardiogram 2D/ M-Mode/ Colorflow/ Do    Narrative Transthoracic Echocardiography Report (TTE)     Demographics      Patient Name    Bright El  Gender               Male                   M      MR #            [id number]       Race                                                       Ethnicity      Account #       [de-identified]       Room Number      Accession       [id number]       Date of Study        09/19/2019   Number      Date of Birth   1970      Referring Physician  Saeid Colon MD      Age             52 year(s)      Elsi Sarmiento [facility]                                      Interpreting         Haley Marrero MD                                   Physician     Procedure    Type of Study      TTE procedure:ECHOCARDIOGRAM COMPLETE 2D W DOPPLER W COLOR. Procedure Date  Date: 09/19/2019 Start: 04:04 PM    Study Location: Echo Lab  Technical Quality: Adequate visualization    Indications:Pleural effusion and Chest pain. Additional Medical History:Thyroid disease, DVT, Obesity, Syncope, Diabetic,  Pulmonary Embolism, Chest Pain, Hypertension, Bipolar, Tachycardia,  Dizziness, GERD, Coronary artery disease, Hyperlipidemia. Patient Status: Routine    Height: 74 inches Weight: 233.01 pounds BSA: 2.32 m^2 BMI: 29.92 kg/m^2    BP: 134/76 mmHg    Allergies    - Other allergy:(Cipro, Lovenox, Metformin, Niacin, Ultram, Warfarin). Conclusions      Summary   Ejection fraction is visually estimated at 60%.    Overall left ventricular function is normal.      Signature ----------------------------------------------------------------   Electronically signed by Yong Ordaz MD (Interpreting   physician) on 09/19/2019 at 04:49 PM   ----------------------------------------------------------------      Findings      Mitral Valve   Mild systolic anterior motion (ZANE) of anterior leaflet. Aortic Valve   The aortic valve was trileaflet with normal thickness and cuspal   separation. DOPPLER: Transaortic velocity was within the normal range with   no evidence of aortic stenosis. There was no evidence of aortic   regurgitation. Tricuspid Valve   The tricuspid valve structure was normal with normal leaflet separation. DOPPLER: There was no evidence of tricuspid stenosis. There was no   evidence of tricuspid regurgitation. Pulmonic Valve   The pulmonic valve leaflets exhibited normal thickness, no calcification,   and normal cuspal separation. DOPPLER: The transpulmonic velocity was   within the normal range with no evidence for regurgitation. Left Atrium   Left atrial size was normal.      Left Ventricle   Ejection fraction is visually estimated at 60%. Overall left ventricular function is normal.      Right Atrium   Right atrial size was normal.      Right Ventricle   The right ventricular size was normal with normal systolic function and   wall thickness. Pericardial Effusion   The pericardium was normal in appearance with no evidence of a pericardial   effusion. Pleural Effusion   No evidence of pleural effusion. Aorta / Great Vessels   -Aortic root dimension within normal limits.   -The Pulmonary artery is within normal limits. -IVC size is within normal limits with normal respiratory phasic changes.      M-Mode/2D Measurements & Calculations      LV Diastolic   LV Systolic Dimension: 3  AV Cusp Separation: 2.5 cmLA   Dimension: 4.5 cm                        Dimension: 3.6 cmAO Root   cm             LV Volume Diastolic: 74.1 Dimension: 3.7 cmLA heart failure symptoms  Poor exercise tolerance due to knees  Normal Echo, stress and cath  Proceed with scheduled surgery at moderate risk from cardiac standpoint  No further workup necessary  The patient is asked to make an attempt to improve diet and exercise patterns to aid in medical management of this problem. Advised more plant based nutrition/meditarrean diet   Advised patient to call office or seek immediate medical attention if there is any new onset of  any chest pain, sob, palpitations, lightheadedness, dizziness, orthopnea, PND or pedal edema. All medication side effects were discussed in details. Thank youfor allowing me to participate in the care of this patient. Please do not hesitate to contact me for any further questions. Return in about 6 months (around 4/29/2021) for Regular follow up, Review testing.        Electronically signed by Sincere Cox MD [facility]  10/29/2020 at 8:25 AM EDT

## 2020-10-29 NOTE — PROGRESS NOTES
New pt here for check up needs clearance for thyroid surgery with Dr. Yani Garcia josee 11/27/20    Saw Dr. Latonya Le in past    Pt states med list is correct from 10/20/20 appt

## 2020-11-04 ENCOUNTER — CARE COORDINATION (OUTPATIENT)
Dept: CARE COORDINATION | Age: 50
End: 2020-11-04

## 2020-11-04 NOTE — CARE COORDINATION
I sent the patient information for re enrollment into the PAP program for 2021. I also sent the provider their portion of the application.        Torie Lafleur [facility]  [address] [facility]   Medication Assistance  JESSENIA DOMÍNGUEZ II[employer]    W)[phone] (S)[phone]

## 2020-11-05 ENCOUNTER — HOSPITAL ENCOUNTER (OUTPATIENT)
Dept: CT IMAGING | Age: 50
Discharge: HOME | End: 2020-11-05
Payer: MEDICARE

## 2020-11-05 ENCOUNTER — NURSE ONLY (OUTPATIENT)
Dept: LAB | Age: 50
End: 2020-11-05

## 2020-11-05 LAB
CALCIUM SERPL-MCNC: 9.4 MG/DL (ref 8.5–10.5)
PTH INTACT: 30 PG/ML (ref 15–65)

## 2020-11-05 PROCEDURE — 70486 CT MAXILLOFACIAL W/O DYE: CPT

## 2020-11-09 ENCOUNTER — OFFICE VISIT (OUTPATIENT)
Dept: PULMONOLOGY | Age: 50
End: 2020-11-09
Payer: MEDICARE

## 2020-11-09 VITALS
SYSTOLIC BLOOD PRESSURE: 132 MMHG | TEMPERATURE: 98 F | DIASTOLIC BLOOD PRESSURE: 82 MMHG | HEART RATE: 84 BPM | OXYGEN SATURATION: 97 % | HEIGHT: 74 IN | BODY MASS INDEX: 30.77 KG/M2 | WEIGHT: 239.8 LBS

## 2020-11-09 PROCEDURE — G8427 DOCREV CUR MEDS BY ELIG CLIN: HCPCS | Performed by: NURSE PRACTITIONER

## 2020-11-09 PROCEDURE — G8417 CALC BMI ABV UP PARAM F/U: HCPCS | Performed by: NURSE PRACTITIONER

## 2020-11-09 PROCEDURE — 1036F TOBACCO NON-USER: CPT | Performed by: NURSE PRACTITIONER

## 2020-11-09 PROCEDURE — 99214 OFFICE O/P EST MOD 30 MIN: CPT | Performed by: NURSE PRACTITIONER

## 2020-11-09 PROCEDURE — G8484 FLU IMMUNIZE NO ADMIN: HCPCS | Performed by: NURSE PRACTITIONER

## 2020-11-09 PROCEDURE — 3017F COLORECTAL CA SCREEN DOC REV: CPT | Performed by: NURSE PRACTITIONER

## 2020-11-09 ASSESSMENT — ENCOUNTER SYMPTOMS
DIARRHEA: 0
CHEST TIGHTNESS: 0
STRIDOR: 0
NAUSEA: 0
SHORTNESS OF BREATH: 1
VOMITING: 0
COUGH: 0
WHEEZING: 0

## 2020-11-09 NOTE — PROGRESS NOTES
[facility]    Patient: Loni Pena, 48 y.o.   : 1970    Pt of Dr. Brian Mayorga   Patient presents with    Follow-up     Pre-op clearvictorina appt, surgery 2020        HPI  Chrystal Mclain is here for pre op testing prior to thyroid lobectomy, uvulopalatopharyngoplasty with Dr. Malathi Headley from ENT. Patient PMH significant for obstructive sleep apnea with AHI 7 on PSG 10/12/2020. Previously discussed various forms of treatment of his obstructive sleep apnea due to reported severe claustrophobia he elected for surgical intervention with Dr. Malathi Headley. Patient reports SOB with exertion has had normal PFT MCCT 6 MWT and ECHO. Noted decrease MIP no history of familial neuromuscular disease following with Dr. Yancy Chung from [facility] for history of seizures. Non-smoker  Currently disabled since . Progress History:   Since last visit any new medical issues? No  New ER or hospital visits? No  Any new or changes in medicines? No  Using inhalers?  No    Past Medical hx   PMH:  Past Medical History:   Diagnosis Date    Abnormal thyroid biopsy     Acid reflux     Anemia     previously seen by Dr. Wendy Lopez (due to enlarged spleen)    Bipolar disorder ([facility]     Blood clot in vein 16    [facility]) 2019    thyroid    Chest pain     previously seeing [facility] cardiologist, now seeing Dr. Luis Correa (LAD bridging on cath 2016)    Chronic back pain     Chronic bronchitis ([facility]     Dr. Nirav Benjamin 10/2012 - no longer following with him    Colon polyp 2016    Depression     Dr. Elizabeth Foss DVT (deep venous thrombosis) ([facility] [address] [id number]    not on Coumadin due to unable to regulate - Dr. Roland Ramirez - no etiology found per patient    Esophageal abnormality     nodule     Fatty liver disease, nonalcoholic     [facility]    Furuncle     legs    History of Doppler ultrasound -    No hemodynamically significant carotid stenosis is identified. A thyroid nodule on each side. Dedicated ultrasound of thyroid gland is suggested to further evaluate if clinically indicated.      History of pulmonary embolism 2007    s/p GFF, related to knee surgery    Hx of blood clots     Hyperlipidemia     severely elevated triglycerides    Hypertension     diastolic    Intracranial arachnoid cyst 11/2012    Dr. Yecenia Ross drained, complicated with seizures, DKA    Irritable bowel syndrome     Liver disease     Gina Sivan - elevated LFT - positive smooth muscle antibody, steatosis per liver bx 3/2014 with Dr. Bella Schmid (multiple drug resistant organisms) resistance 2012    MRSA (methicillin resistant staph aureus) culture positive     h/o in foot and before brain surgery    Nephrolithiasis     noted on CT abdomen 9/2016, 6/2019    Neuropathy     Pancreatic insufficiency     Positive SANDY (antinuclear antibody)     Dr. Daljit Villarreal - first visit in 6/2013    Restless legs syndrome     Seizures ([address]     Sinus tachycardia     Holter 3/2013 - seeing Dr. Tracy Jensen fracture [facility])     clips     Sleep apnea     multiple MD's suggested testing but he refuses to be tested as claustraphobic and won't use Cpap    Sleep apnea 10/2020    Type II or unspecified type diabetes mellitus without mention of complication, not stated as uncontrolled 2007     SURGICAL HISTORY:  Past Surgical History:   Procedure Laterality Date    CARDIAC CATHETERIZATION      2011,5-6 years ago   [address]  3-2012   [address]  04/28/2016    [facility]     CARPAL TUNNEL RELEASE  01/2017    CHOLECYSTECTOMY  2004    COLONOSCOPY  2012    COLONOSCOPY  08/2016    2 tubular adenomas - reportedly needs repeat scope in 6 months    CRANIOTOMY  11/2012    arachnoid cyst drainage    EKG 12-LEAD  10/18/2015         ENDOSCOPY, COLON, DIAGNOSTIC      HERNIA REPAIR Right 1996    [facility]--Dr. Yarelis Echevarria INGUINAL HERNIA Arthritis Mother     Seizures Mother     Obesity Sister     Other Brother         pulmonary embolism    Diabetes Daughter     Seizures Brother      CURRENT MEDICATIONS:  Current Outpatient Medications   Medication Sig Dispense Refill    Cyanocobalamin (VITAMIN B-12) 2500 MCG SUBL Place 1 tablet under the tongue daily 90 tablet 1    gabapentin (NEURONTIN) 600 MG tablet Take 1 tablet by mouth 4 times daily for 90 days. 360 tablet 1    rOPINIRole (REQUIP) 1 MG tablet TAKE 1 TABLET BY MOUTH THREE TIMES A  tablet 1    fenofibrate (TRICOR) 145 MG tablet Take 1 tablet by mouth daily 90 tablet 1    atorvastatin (LIPITOR) 10 MG tablet TAKE 1 TABLET BY MOUTH ONE TIME A DAY 90 tablet 1    vitamin D (ERGOCALCIFEROL) 1.25 MG (92338 UT) CAPS capsule Take 1 capsule by mouth once a week 12 capsule 1    metoprolol tartrate (LOPRESSOR) 50 MG tablet Take 1.5 tablets by mouth 2 times daily 270 tablet 1    blood glucose test strips (ASCENSIA AUTODISC VI;ONE TOUCH ULTRA TEST VI) strip Test blood sugars 3 times daily DX:E11.40 100 each 3    insulin glargine (LANTUS;BASAGLAR) 100 UNIT/ML injection pen 75 Units Qam, 55 Units Qpm (Patient taking differently: 75 Units Qam, 75 Units Qpm) 11 pen 3    insulin lispro, 1 Unit Dial, (HUMALOG KWIKPEN) 100 UNIT/ML SOPN Inject 30 Units TID before meals plus SSI.  (Patient taking differently: Inject 30 Units TID before meals plus SS 2) 8 pen 3    blood glucose monitor kit and supplies Test 3 times a day dx:E11.40 1 kit 0    empagliflozin (JARDIANCE) 25 MG tablet Take 25 mg by mouth daily 42 tablet 0    promethazine (PHENERGAN) 12.5 MG tablet Take 1-2 tablets by mouth every 8 hours as needed for Nausea 60 tablet 2    pantoprazole (PROTONIX) 40 MG tablet Take 1 tablet by mouth 2 times daily 180 tablet 1    Melatonin 10 MG TABS Take 30 mg by mouth nightly       DULoxetine (CYMBALTA) 60 MG extended release capsule Take 120 mg by mouth daily       lacosamide (VIMPAT) 100 MG TABS regular rhythm. Heart sounds: Normal heart sounds. No murmur. Pulmonary:      Effort: Pulmonary effort is normal. No respiratory distress. Breath sounds: Normal breath sounds. No stridor. No wheezing or rales. Chest:      Chest wall: No tenderness. Abdominal:      General: Bowel sounds are normal. There is no distension. Palpations: Abdomen is soft. Skin:     General: Skin is warm and dry. Capillary Refill: Capillary refill takes less than 2 seconds. Neurological:      Mental Status: He is alert and oriented to person, place, and time. Psychiatric:         Behavior: Behavior normal.         Thought Content: Thought content normal.          Results   Lung Nodule Screening     [] Qualifies    [x] Does not qualify   [] Declined    [] Completed  Non-smoker   The USPSTF recommends annual screening for lung cancer with low-dose computed tomography (LDCT) in adults aged 54 to [de-identified] years who have a 30 pack-year smoking history and currently smoke or have quit within the past 15 years. Screening should be discontinued once a person has not smoked for 15 years or develops a health problem that substantially limits life expectancy or the ability or willingness to have curative lung surgery. Assessment      Diagnosis Orders   1. Preop pulmonary/respiratory exam  XR CHEST 1 VIEW   2. MISTI (obstructive sleep apnea)           Plan   -From a Pulmonary standpoint patient would be at low to moderate risk for pulmonary complications going for a procedure involving general anesthesia and mechanical ventilation,due to history of sleep apnea, risks include but are not limited to prolonged mechanical ventilation, inability to wean from mechanical ventilation, postoperative pneumonia, and reintubation.  To minimize complications recommend breathing treatments pre and post operatively, monitoring SpO2 and aggressive pulmonary hygiene after procedure.   -Will obtain CXR prior to surgery and call

## 2020-11-10 ENCOUNTER — TELEPHONE (OUTPATIENT)
Dept: INTERNAL MEDICINE CLINIC | Age: 50
End: 2020-11-10

## 2020-11-10 ENCOUNTER — TELEPHONE (OUTPATIENT)
Dept: ENT CLINIC | Age: 50
End: 2020-11-10

## 2020-11-10 NOTE — TELEPHONE ENCOUNTER
Patient called stating that his wife is an ER nurse at [employer] and just tested positive for Covid . Patient was asking if he should be test . Patient currently has no symptoms and he is scheduled for a Covid test on 11/20 for an upcoming surgery. The pediatrician wants there grandchildren tested . Please advise.

## 2020-11-10 NOTE — TELEPHONE ENCOUNTER
Patient called and stated that his mother tested positive for COVID on 11/9/2020. He is scheduled for surgery on 11/27/2020. Informed patient that he needs to monitor symptoms and to call if he has symptoms. Informed that he needs to have the COVID test done 6-7 days prior to surgery. He verbalized understanding.

## 2020-11-11 ENCOUNTER — HOSPITAL ENCOUNTER (OUTPATIENT)
Age: 50
Setting detail: SPECIMEN
Discharge: HOME | End: 2020-11-11
Payer: MEDICARE

## 2020-11-11 ENCOUNTER — TELEPHONE (OUTPATIENT)
Dept: INTERNAL MEDICINE CLINIC | Age: 50
End: 2020-11-11

## 2020-11-11 PROCEDURE — U0003 INFECTIOUS AGENT DETECTION BY NUCLEIC ACID (DNA OR RNA); SEVERE ACUTE RESPIRATORY SYNDROME CORONAVIRUS 2 (SARS-COV-2) (CORONAVIRUS DISEASE [COVID-19]), AMPLIFIED PROBE TECHNIQUE, MAKING USE OF HIGH THROUGHPUT TECHNOLOGIES AS DESCRIBED BY CMS-2020-01-R: HCPCS

## 2020-11-11 NOTE — TELEPHONE ENCOUNTER
Patient called stating that he is now having a cough and body aches and is requesting a covid test . Covid test ordered and patient going to [facility] to have done .

## 2020-11-13 LAB — SARS-COV-2: DETECTED

## 2020-11-15 ENCOUNTER — APPOINTMENT (OUTPATIENT)
Dept: GENERAL RADIOLOGY | Age: 50
End: 2020-11-15
Payer: MEDICARE

## 2020-11-15 ENCOUNTER — NURSE TRIAGE (OUTPATIENT)
Dept: OTHER | Facility: CLINIC | Age: 50
End: 2020-11-15

## 2020-11-15 ENCOUNTER — HOSPITAL ENCOUNTER (EMERGENCY)
Age: 50
Discharge: HOME | End: 2020-11-15
Payer: MEDICARE

## 2020-11-15 VITALS
HEIGHT: 74 IN | DIASTOLIC BLOOD PRESSURE: 83 MMHG | TEMPERATURE: 99.5 F | HEART RATE: 89 BPM | RESPIRATION RATE: 19 BRPM | SYSTOLIC BLOOD PRESSURE: 132 MMHG | WEIGHT: 235 LBS | BODY MASS INDEX: 30.16 KG/M2 | OXYGEN SATURATION: 92 %

## 2020-11-15 LAB
ALBUMIN SERPL-MCNC: 4.1 G/DL (ref 3.5–5.1)
ALP BLD-CCNC: 82 U/L (ref 38–126)
ALT SERPL-CCNC: 24 U/L (ref 11–66)
ANION GAP SERPL CALCULATED.3IONS-SCNC: 13 MEQ/L (ref 8–16)
AST SERPL-CCNC: 21 U/L (ref 5–40)
BASOPHILS # BLD: 0.2 %
BASOPHILS ABSOLUTE: 0 THOU/MM3 (ref 0–0.1)
BETA-HYDROXYBUTYRATE: 1.35 MG/DL (ref 0.2–2.81)
BILIRUB SERPL-MCNC: 1.1 MG/DL (ref 0.3–1.2)
BILIRUBIN URINE: NEGATIVE
BLOOD, URINE: NEGATIVE
BUN BLDV-MCNC: 8 MG/DL (ref 7–22)
C-REACTIVE PROTEIN: 0.54 MG/DL (ref 0–1)
CALCIUM SERPL-MCNC: 9.6 MG/DL (ref 8.5–10.5)
CHARACTER, URINE: CLEAR
CHLORIDE BLD-SCNC: 98 MEQ/L (ref 98–111)
CO2: 26 MEQ/L (ref 23–33)
COLOR: YELLOW
CREAT SERPL-MCNC: 0.4 MG/DL (ref 0.4–1.2)
D-DIMER QUANTITATIVE: < 215 NG/ML FEU (ref 0–500)
EKG ATRIAL RATE: 90 BPM
EKG P AXIS: 24 DEGREES
EKG P-R INTERVAL: 188 MS
EKG Q-T INTERVAL: 328 MS
EKG QRS DURATION: 72 MS
EKG QTC CALCULATION (BAZETT): 401 MS
EKG R AXIS: 15 DEGREES
EKG T AXIS: 17 DEGREES
EKG VENTRICULAR RATE: 90 BPM
EOSINOPHIL # BLD: 0.7 %
EOSINOPHILS ABSOLUTE: 0 THOU/MM3 (ref 0–0.4)
ERYTHROCYTE [DISTWIDTH] IN BLOOD BY AUTOMATED COUNT: 12.2 % (ref 11.5–14.5)
ERYTHROCYTE [DISTWIDTH] IN BLOOD BY AUTOMATED COUNT: 38.9 FL (ref 35–45)
FERRITIN: 276 NG/ML (ref 22–322)
GFR SERPL CREATININE-BSD FRML MDRD: > 90 ML/MIN/1.73M2
GLUCOSE BLD-MCNC: 138 MG/DL (ref 70–108)
GLUCOSE BLD-MCNC: 365 MG/DL (ref 70–108)
GLUCOSE URINE: >= 1000 MG/DL
HCT VFR BLD CALC: 45 % (ref 42–52)
HEMOGLOBIN: 16.3 GM/DL (ref 14–18)
IMMATURE GRANS (ABS): 0.03 THOU/MM3 (ref 0–0.07)
IMMATURE GRANULOCYTES: 0.7 %
KETONES, URINE: ABNORMAL
LACTIC ACID: 1.5 MMOL/L (ref 0.5–2.2)
LACTIC ACID: 2.4 MMOL/L (ref 0.5–2.2)
LD: 255 U/L (ref 100–190)
LEUKOCYTE ESTERASE, URINE: NEGATIVE
LYMPHOCYTES # BLD: 32.2 %
LYMPHOCYTES ABSOLUTE: 1.5 THOU/MM3 (ref 1–4.8)
MCH RBC QN AUTO: 31.9 PG (ref 26–33)
MCHC RBC AUTO-ENTMCNC: 36.2 GM/DL (ref 32.2–35.5)
MCV RBC AUTO: 88.1 FL (ref 80–94)
MONOCYTES # BLD: 8.1 %
MONOCYTES ABSOLUTE: 0.4 THOU/MM3 (ref 0.4–1.3)
NITRITE, URINE: NEGATIVE
NUCLEATED RED BLOOD CELLS: 0 /100 WBC
OSMOLALITY CALCULATION: 287 MOSMOL/KG (ref 275–300)
PH UA: 5.5 (ref 5–9)
PLATELET # BLD: 170 THOU/MM3 (ref 130–400)
PMV BLD AUTO: 9.9 FL (ref 9.4–12.4)
POTASSIUM REFLEX MAGNESIUM: 3.9 MEQ/L (ref 3.5–5.2)
PROTEIN UA: NEGATIVE
RBC # BLD: 5.11 MILL/MM3 (ref 4.7–6.1)
SEG NEUTROPHILS: 58.1 %
SEGMENTED NEUTROPHILS ABSOLUTE COUNT: 2.7 THOU/MM3 (ref 1.8–7.7)
SODIUM BLD-SCNC: 137 MEQ/L (ref 135–145)
SPECIFIC GRAVITY, URINE: > 1.03 (ref 1–1.03)
TOTAL PROTEIN: 7.4 G/DL (ref 6.1–8)
TROPONIN T: < 0.01 NG/ML
UROBILINOGEN, URINE: 1 EU/DL (ref 0–1)
WBC # BLD: 4.6 THOU/MM3 (ref 4.8–10.8)

## 2020-11-15 PROCEDURE — 85025 COMPLETE CBC W/AUTO DIFF WBC: CPT

## 2020-11-15 PROCEDURE — 82728 ASSAY OF FERRITIN: CPT

## 2020-11-15 PROCEDURE — 83615 LACTATE (LD) (LDH) ENZYME: CPT

## 2020-11-15 PROCEDURE — 71045 X-RAY EXAM CHEST 1 VIEW: CPT

## 2020-11-15 PROCEDURE — 82948 REAGENT STRIP/BLOOD GLUCOSE: CPT

## 2020-11-15 PROCEDURE — 93010 ELECTROCARDIOGRAM REPORT: CPT | Performed by: NUCLEAR MEDICINE

## 2020-11-15 PROCEDURE — 80053 COMPREHEN METABOLIC PANEL: CPT

## 2020-11-15 PROCEDURE — 96361 HYDRATE IV INFUSION ADD-ON: CPT

## 2020-11-15 PROCEDURE — 96366 THER/PROPH/DIAG IV INF ADDON: CPT

## 2020-11-15 PROCEDURE — 36415 COLL VENOUS BLD VENIPUNCTURE: CPT

## 2020-11-15 PROCEDURE — 93005 ELECTROCARDIOGRAM TRACING: CPT | Performed by: PHYSICIAN ASSISTANT

## 2020-11-15 PROCEDURE — 6370000000 HC RX 637 (ALT 250 FOR IP): Performed by: PHYSICIAN ASSISTANT

## 2020-11-15 PROCEDURE — 96365 THER/PROPH/DIAG IV INF INIT: CPT

## 2020-11-15 PROCEDURE — 96375 TX/PRO/DX INJ NEW DRUG ADDON: CPT

## 2020-11-15 PROCEDURE — 99285 EMERGENCY DEPT VISIT HI MDM: CPT

## 2020-11-15 PROCEDURE — 82010 KETONE BODYS QUAN: CPT

## 2020-11-15 PROCEDURE — 81003 URINALYSIS AUTO W/O SCOPE: CPT

## 2020-11-15 PROCEDURE — 2580000003 HC RX 258: Performed by: PHYSICIAN ASSISTANT

## 2020-11-15 PROCEDURE — 6360000002 HC RX W HCPCS: Performed by: PHYSICIAN ASSISTANT

## 2020-11-15 PROCEDURE — 85379 FIBRIN DEGRADATION QUANT: CPT

## 2020-11-15 PROCEDURE — 83605 ASSAY OF LACTIC ACID: CPT

## 2020-11-15 PROCEDURE — 86140 C-REACTIVE PROTEIN: CPT

## 2020-11-15 PROCEDURE — 84484 ASSAY OF TROPONIN QUANT: CPT

## 2020-11-15 RX ORDER — METOCLOPRAMIDE HYDROCHLORIDE 5 MG/ML
10 INJECTION INTRAMUSCULAR; INTRAVENOUS ONCE
Status: COMPLETED | OUTPATIENT
Start: 2020-11-15 | End: 2020-11-15

## 2020-11-15 RX ORDER — ONDANSETRON 4 MG/1
4 TABLET, ORALLY DISINTEGRATING ORAL EVERY 8 HOURS PRN
Qty: 20 TABLET | Refills: 0 | Status: SHIPPED | OUTPATIENT
Start: 2020-11-15 | End: 2020-12-22

## 2020-11-15 RX ORDER — 0.9 % SODIUM CHLORIDE 0.9 %
1000 INTRAVENOUS SOLUTION INTRAVENOUS ONCE
Status: COMPLETED | OUTPATIENT
Start: 2020-11-15 | End: 2020-11-15

## 2020-11-15 RX ORDER — MORPHINE SULFATE 2 MG/ML
2 INJECTION, SOLUTION INTRAMUSCULAR; INTRAVENOUS ONCE
Status: COMPLETED | OUTPATIENT
Start: 2020-11-15 | End: 2020-11-15

## 2020-11-15 RX ORDER — ORPHENADRINE CITRATE 30 MG/ML
60 INJECTION INTRAMUSCULAR; INTRAVENOUS ONCE
Status: DISCONTINUED | OUTPATIENT
Start: 2020-11-15 | End: 2020-11-15

## 2020-11-15 RX ORDER — BUTALBITAL, ACETAMINOPHEN AND CAFFEINE 50; 325; 40 MG/1; MG/1; MG/1
1 TABLET ORAL EVERY 4 HOURS PRN
Qty: 10 TABLET | Refills: 0 | Status: SHIPPED | OUTPATIENT
Start: 2020-11-15 | End: 2020-12-22 | Stop reason: ALTCHOICE

## 2020-11-15 RX ORDER — ASCORBIC ACID 500 MG
500 TABLET ORAL 2 TIMES DAILY
Qty: 28 TABLET | Refills: 0 | Status: SHIPPED | OUTPATIENT
Start: 2020-11-15 | End: 2021-01-07 | Stop reason: ALTCHOICE

## 2020-11-15 RX ORDER — ORPHENADRINE CITRATE 30 MG/ML
60 INJECTION INTRAMUSCULAR; INTRAVENOUS ONCE
Status: DISCONTINUED | OUTPATIENT
Start: 2020-11-15 | End: 2020-11-15 | Stop reason: HOSPADM

## 2020-11-15 RX ORDER — AZITHROMYCIN 250 MG/1
TABLET, FILM COATED ORAL
Qty: 4 TABLET | Refills: 0 | Status: SHIPPED | OUTPATIENT
Start: 2020-11-15 | End: 2020-12-22 | Stop reason: ALTCHOICE

## 2020-11-15 RX ORDER — DIPHENHYDRAMINE HYDROCHLORIDE 50 MG/ML
25 INJECTION INTRAMUSCULAR; INTRAVENOUS ONCE
Status: COMPLETED | OUTPATIENT
Start: 2020-11-15 | End: 2020-11-15

## 2020-11-15 RX ORDER — MELATONIN
2000 DAILY
Qty: 28 TABLET | Refills: 0 | Status: SHIPPED | OUTPATIENT
Start: 2020-11-15 | End: 2020-11-17

## 2020-11-15 RX ADMIN — METOCLOPRAMIDE 10 MG: 5 INJECTION, SOLUTION INTRAMUSCULAR; INTRAVENOUS at 11:44

## 2020-11-15 RX ADMIN — INSULIN HUMAN 10 UNITS: 100 INJECTION, SOLUTION PARENTERAL at 16:22

## 2020-11-15 RX ADMIN — SODIUM CHLORIDE 1000 ML: 9 INJECTION, SOLUTION INTRAVENOUS at 11:44

## 2020-11-15 RX ADMIN — LIDOCAINE HYDROCHLORIDE: 20 SOLUTION ORAL; TOPICAL at 15:07

## 2020-11-15 RX ADMIN — DIPHENHYDRAMINE HYDROCHLORIDE 25 MG: 50 INJECTION INTRAMUSCULAR; INTRAVENOUS at 11:45

## 2020-11-15 RX ADMIN — MORPHINE SULFATE 2 MG: 2 INJECTION, SOLUTION INTRAMUSCULAR; INTRAVENOUS at 11:45

## 2020-11-15 RX ADMIN — AZITHROMYCIN DIHYDRATE 500 MG: 500 INJECTION, POWDER, LYOPHILIZED, FOR SOLUTION INTRAVENOUS at 14:06

## 2020-11-15 ASSESSMENT — ENCOUNTER SYMPTOMS
SORE THROAT: 0
VOMITING: 1
COUGH: 1
DIARRHEA: 1
COLOR CHANGE: 0
BACK PAIN: 1
SHORTNESS OF BREATH: 1

## 2020-11-15 ASSESSMENT — PAIN SCALES - GENERAL
PAINLEVEL_OUTOF10: 5
PAINLEVEL_OUTOF10: 4
PAINLEVEL_OUTOF10: 5

## 2020-11-15 ASSESSMENT — PAIN DESCRIPTION - PAIN TYPE: TYPE: ACUTE PAIN

## 2020-11-15 ASSESSMENT — PAIN DESCRIPTION - LOCATION: LOCATION: CHEST

## 2020-11-15 NOTE — TELEPHONE ENCOUNTER
Reason for Disposition   SEVERE or constant chest pain or pressure (Exception: mild central chest pain, present only when coughing)    Answer Assessment - Initial Assessment Questions  1. COVID-19 DIAGNOSIS: \"Who made your Coronavirus (COVID-19) diagnosis? \" \"Was it confirmed by a positive lab test?\" If not diagnosed by a HCP, ask \"Are there lots of cases (community spread) where you live? \" (See public health department website, if unsure)      Positive test     2. ONSET: \"When did the COVID-19 symptoms start? \"       Friday    3. WORST SYMPTOM: \"What is your worst symptom? \" (e.g., cough, fever, shortness of breath, muscle aches)      Cp, back pain    4. COUGH: \"Do you have a cough? \" If so, ask: \"How bad is the cough? \"        Dry cough    5. FEVER: \"Do you have a fever? \" If so, ask: \"What is your temperature, how was it measured, and when did it start? \"      101    6. RESPIRATORY STATUS: \"Describe your breathing? \" (e.g., shortness of breath, wheezing, unable to speak)       Sob even at rest    7. BETTER-SAME-WORSE: Dulce Fluke you getting better, staying the same or getting worse compared to yesterday? \"  If getting worse, ask, \"In what way? \"      Worse    8. HIGH RISK DISEASE: \"Do you have any chronic medical problems? \" (e.g., asthma, heart or lung disease, weak immune system, etc.)      Diabetes, hx pe, htn    9. PREGNANCY: \"Is there any chance you are pregnant? \" \"When was your last menstrual period? \"       10. OTHER SYMPTOMS: \"Do you have any other symptoms? \"  (e.g., chills, fatigue, headache, loss of smell or taste, muscle pain, sore throat)       Fatigue, cough, sob, loss of smell, fever, muscle aches, cp, back pain    Protocols used: CORONAVIRUS (COVID-19) DIAGNOSED OR SUSPECTED-ADULT-AH    Patient called pre-service center [facility])  with red flag complaint.      Brief description of triage: as above    Triage indicates for patient to go e to er now sob at rest cp8/10 hx diabetes and pe, htn and systolic bridge    Care advice provided, patient verbalizes understanding; denies any other questions or concerns; instructed to call back for any new or worsening symptoms. Attention Provider: Thank you for allowing me to participate in the care of your patient. The patient was connected to triage in response to information provided to the ECC. Please do not respond through this encounter as the response is not directed to a shared pool.

## 2020-11-15 NOTE — ED NOTES
Pt to er. Pt c/o cp, back pain, flank pain, cough and feeling more SOB since being dx COVID on Friday. Assessment completed. Resp regular. Call light in reach.       El Ramirez RN  11/15/20 3082

## 2020-11-15 NOTE — ACP (ADVANCE CARE PLANNING)
Advance Care Planning     Advance Care Planning Activator (Inpatient)  Conversation Note      Date of ACP Conversation: 11/15/2020    Conversation Conducted with: Patient with Decision Making Capacity    ACP Activator: [address] [facility] Decision Maker: self    Current Designated Health Care Decision Maker:       If no Decision Maker listed above or available through scanned documents, then:    If no Authorized Decision Maker has previously been identified, then patient chooses Health Care Decision Maker:  \"Who would you like to name as your primary health care decision-maker? \"               Name: Nasra Singh        Relationship: x-wife          Phone number: [phone]  \"Can this person be reached easily? \" Yes  \"Who would you like to name as your back-up decision maker? \"   Name: Makayla Seymour        Relationship: daughter          Phone number: [phone]  \"Can this person be reached easily? \" Yes        Care Preferences    Ventilation: \"If you were in your present state of health and suddenly became very ill and were unable to breathe on your own, what would your preference be about the use of a ventilator (breathing machine) if it were available to you? \"      Would the patient desire the use of ventilator (breathing machine)?: yes    \"If your health worsens and it becomes clear that your chance of recovery is unlikely, what would your preference be about the use of a ventilator (breathing machine) if it were available to you? \"     Would the patient desire the use of ventilator (breathing machine)?: Yes      Resuscitation  \"CPR works best to restart the heart when there is a sudden event, like a heart attack, in someone who is otherwise healthy. Unfortunately, CPR does not typically restart the heart for people who have serious health conditions or who are very sick. \"    \"In the event your heart stopped as a result of an underlying serious health condition, would you want attempts to be made to restart your heart (answer \"yes\" for attempt to resuscitate) or would you prefer a natural death (answer \"no\" for do not attempt to resuscitate)? \" yes         [] Yes   [] No   Educated Patient / Carmen Denny regarding differences between Advance Directives and portable DNR orders. Length of ACP Conversation in minutes:  11    Conversation Outcomes:  [x] ACP discussion completed  [] Existing advance directive reviewed with patient; no changes to patient's previously recorded wishes  [] New Advance Directive completed  [] Portable Do Not Rescitate prepared for Provider review and signature  [] POLST/POST/MOLST/MOST prepared for Provider review and signature      Follow-up plan:    [] Schedule follow-up conversation to continue planning  [x] Referred individual to Provider for additional questions/concerns   [] Advised patient/agent/surrogate to review completed ACP document and update if needed with changes in condition, patient preferences or care setting    [] This note routed to one or more involved healthcare providers       Pt in the ER alone today. Pt and Ne Srivastava were ,  for 18 months and have since been back together. The couple have two children - and are raising grandchildren as well.

## 2020-11-15 NOTE — ED NOTES
Called home medical for oxygen. States would contact the resp therapist and call us back.       Angelica Day RN  11/15/20 1997

## 2020-11-15 NOTE — ED NOTES
[facility] PA-C in room to update pt on results and POC. Pt states oxygen has been occasionally gone down to 80s. [facility] PA-C re-evaluated and states SPO2 highest was 91%. States would contact hospitalist to see if admit or could place an order for home oxygen. Pt denies other needs. Resp regular. Call light in reach.       Brinda Valencia RN  11/15/20 0875

## 2020-11-15 NOTE — ED NOTES
Pt resting in b ed. Resp regular. Denies all needs. Call light in reach.       Tip Sellers RN  11/15/20 5166

## 2020-11-15 NOTE — ED NOTES
Pt resting in bed with resp easy and regular. Denies needs. Antibiotic started. Call light in reach.       Khushboo Parks RN  11/15/20 8618

## 2020-11-15 NOTE — ED PROVIDER NOTES
[facility]  eMERGENCY dEPARTMENT eNCOUnter          [address] [facility]       Chief Complaint   Patient presents with    Shortness of Breath    Chest Pain       Nurses Notes reviewed and I agree except as noted inthe HPI. HISTORY OF PRESENT ILLNESS    Kim Raza is a 48 y.o. male who presents to the Emergency Department for the evaluation of Covid with worsening symptoms. Patient reports he was diagnosed 2 days ago and has had symptoms of fever, chills, cough, chest pain, headache, back pain, right flank pain as well as his chronic shortness of breath, vomiting and diarrhea. He has been taking Tylenol and Motrin at home without improvement in his symptoms. He states the chest pain has been constant since Friday without exacerbating or alleviating factors and he did have some associated left upper extremity pain that lasted for 1.5 days before resolving. He denies any cardiac history other than noting systolic bridge and history of PE. He denies any pulmonary history but does follow with pulmonology for history of exertional shortness of breath without diagnosis. He reports hyperglycemia over the past few days despite compliance with his insulin. States he has been unable to get his blood glucose below 400 over the past few days. Reports he has increased his mealtime insulin from 35 units plus sliding scale to 45 units plus sliding scale. Reports most significant pain present in his low back as well as chest at this time. The HPI was provided by the patient. REVIEW OF SYSTEMS     Review of Systems   Constitutional: Positive for chills and fever. HENT: Negative for sore throat. Eyes: Negative for visual disturbance. Respiratory: Positive for cough and shortness of breath. Cardiovascular: Positive for chest pain. Negative for leg swelling. Gastrointestinal: Positive for diarrhea and vomiting. Musculoskeletal: Positive for back pain.    Skin: Negative for color change. Neurological: Positive for light-headedness and headaches. Negative for syncope. PAST MEDICAL HISTORY    has a past medical history of Abnormal thyroid biopsy, Acid reflux, Anemia, Bipolar disorder ([address] Blood clot in vein, Cancer (HCC), Chest pain, Chronic back pain, Chronic bronchitis ([address] Colon polyp, Depression, DVT (deep venous thrombosis) ([address] Esophageal abnormality, Fatty liver disease, nonalcoholic, Furuncle, History of Doppler ultrasound, History of pulmonary embolism, Hx of blood clots, Hyperlipidemia, Hypertension, Intracranial arachnoid cyst, Irritable bowel syndrome, Liver disease, MDRO (multiple drug resistant organisms) resistance, MRSA (methicillin resistant staph aureus) culture positive, Nephrolithiasis, Neuropathy, Pancreatic insufficiency, Positive SANDY (antinuclear antibody), Restless legs syndrome, Seizures ([address] Sinus tachycardia, Skull fracture ([address] Sleep apnea, Sleep apnea, and Type II or unspecified type diabetes mellitus without mention of complication, not stated as uncontrolled. SURGICAL HISTORY      has a past surgical history that includes knee surgery (Left, 2007); Vena Cava Filter Placement (2007); hernia repair (Right, 1996); Cholecystectomy (2004); Upper gastrointestinal endoscopy (2012); transthoracic echocardiogram (3-05-11); other surgical history (5-31-11); Cardiac catheterization; Cardiac catheterization (3-2012); craniotomy (11/2012); Tonsillectomy; Endoscopy, colon, diagnostic; EKG 12 Lead (10/18/2015); Knee arthroscopy (Right, 2016); Cardiac catheterization (04/28/2016); Upper gastrointestinal endoscopy (2016); Inguinal hernia repair (Right, 09/15/2016); Colonoscopy (2012); Colonoscopy (08/2016); other surgical history (01/20/2017); shoulder surgery (Right, 01/2007); Carpal tunnel release (01/2017); joint replacement; Upper gastrointestinal endoscopy (Left, 10/22/2019); and Upper gastrointestinal endoscopy (Left, 10/22/2019).     CURRENT MEDICATIONS Discharge Medication List as of 11/15/2020  5:47 PM      CONTINUE these medications which have NOT CHANGED    Details   Cyanocobalamin (VITAMIN B-12) 2500 MCG SUBL Place 1 tablet under the tongue daily, Disp-90 tablet,R-1Normal      gabapentin (NEURONTIN) 600 MG tablet Take 1 tablet by mouth 4 times daily for 90 days. , Disp-360 tablet,R-1Normal      rOPINIRole (REQUIP) 1 MG tablet TAKE 1 TABLET BY MOUTH THREE TIMES A DAY, Disp-270 tablet,R-1Normal      fenofibrate (TRICOR) 145 MG tablet Take 1 tablet by mouth daily, Disp-90 tablet,R-1Normal      atorvastatin (LIPITOR) 10 MG tablet TAKE 1 TABLET BY MOUTH ONE TIME A DAY, Disp-90 tablet,R-1Normal      vitamin D (ERGOCALCIFEROL) 1.25 MG (61148 UT) CAPS capsule Take 1 capsule by mouth once a week, Disp-12 capsule,R-1Normal      metoprolol tartrate (LOPRESSOR) 50 MG tablet Take 1.5 tablets by mouth 2 times daily, Disp-270 tablet,R-1Normal      blood glucose test strips (ASCENSIA AUTODISC VI;ONE TOUCH ULTRA TEST VI) strip Disp-100 each,R-3, NormalTest blood sugars 3 times daily DX:E11.40      insulin glargine (LANTUS;BASAGLAR) 100 UNIT/ML injection pen 75 Units Qam, 55 Units Qpm, Disp-11 pen, R-3Print      insulin lispro, 1 Unit Dial, (HUMALOG KWIKPEN) 100 UNIT/ML SOPN Inject 30 Units TID before meals plus SSI., Disp-8 pen, R-3Print      blood glucose monitor kit and supplies Test 3 times a day dx:E11.40, Disp-1 kit, R-0, Normal      empagliflozin (JARDIANCE) 25 MG tablet Take 25 mg by mouth daily, Disp-42 tablet, R-0Lot [id number] exp 5/2021Sample      promethazine (PHENERGAN) 12.5 MG tablet Take 1-2 tablets by mouth every 8 hours as needed for Nausea, Disp-60 tablet, R-2[facility]      pantoprazole (PROTONIX) 40 MG tablet Take 1 tablet by mouth 2 times daily, Disp-180 tablet, R-1Normal      Melatonin 10 MG TABS Take 30 mg by mouth nightly Historical Med      DULoxetine (CYMBALTA) 60 MG extended release capsule Take 120 mg by mouth daily Historical Med      lacosamide (VIMPAT) 100 MG TABS tablet Take 300 mg by mouth 2 times daily. Historical Med      sildenafil (VIAGRA) 100 MG tablet Take 1 tablet by mouth as needed for Erectile Dysfunction, Disp-45 tablet, R-3Print      divalproex (DEPAKOTE) 500 MG DR tablet Take 500 mg by mouth 2 times daily Historical Med      Blood Glucose Monitoring Suppl (ONE TOUCH [employer] SYSTEM) W/DEVICE KIT Disp-1 kit, R-0, NormalUse to test blood glucose level 4 times per day. Diagnosis: E11.40      clindamycin (CLEOCIN T) 1 % external solution Apply topically 2 times daily. , Disp-30 mL, R-5, Normal      levETIRAcetam (KEPPRA) 500 MG tablet Take 2,000 mg by mouth 2 times daily       LORazepam (ATIVAN) 0.5 MG tablet Take 0.5 mg by mouth 2 times daily as needed (seizures)       zonisamide (ZONEGRAN) 100 MG capsule Take 500 mg by mouth nightly Historical Med             ALLERGIES     is allergic to ciprofloxacin-ciproflox hcl er; heparin; metformin; niacin and related; tramadol hcl; ultram [tramadol]; and warfarin. FAMILY HISTORY     He indicated that his mother is alive. He indicated that his father is . He indicated that his sister is alive. He indicated that three of his five brothers are alive. He indicated that the status of his daughter is unknown.   family history includes Arthritis in his mother; Diabetes in his daughter; Heart Disease (age of onset: 61) in his father; Obesity in his brother and sister; Other in his brother; Seizures in his brother and mother; Stroke in his brother. SOCIAL HISTORY      reports that he has never smoked. He has never used smokeless tobacco. He reports that he does not drink alcohol or use drugs. PHYSICAL EXAM     INITIAL VITALS:  height is 6' 2\" (1.88 m) and weight is 235 lb (106.6 kg). His oral temperature is 99.5 °F (37.5 °C). His blood pressure is 132/83 and his pulse is 89. His respiration is 19 and oxygen saturation is 92%.     Physical Exam  Vitals signs and nursing note reviewed. Constitutional:       Appearance: Normal appearance. HENT:      Head: Normocephalic and atraumatic. Eyes:      Conjunctiva/sclera: Conjunctivae normal.   Neck:      Musculoskeletal: Normal range of motion. Cardiovascular:      Rate and Rhythm: Normal rate and regular rhythm. Heart sounds: Normal heart sounds. No murmur. Pulmonary:      Effort: Pulmonary effort is normal. No respiratory distress. Breath sounds: Normal breath sounds. No wheezing or rhonchi. Abdominal:      Palpations: Abdomen is soft. Tenderness: There is no abdominal tenderness. There is no guarding. Musculoskeletal:         General: No tenderness. Right lower leg: No edema. Left lower leg: No edema. Skin:     General: Skin is warm and dry. Neurological:      General: No focal deficit present. Mental Status: He is alert. Psychiatric:         Mood and Affect: Mood normal.         DIFFERENTIAL DIAGNOSIS:   Differential diagnoses are discussed    DIAGNOSTIC RESULTS     EKG: All EKG's are interpreted by the Emergency Department Physician who either signs or Co-signsthis chart in the absence of a cardiologist.    Vent. Rate: 90 bpm  PRinterval: 188 ms  QRS duration: 72 ms  QTc: 401 ms  P-R-T axes: 24, 15, 17  Normal sinus rhythm. No STEMI. Compared to old EKG on 11-      RADIOLOGY: non-plain film images(s) such as CT, Ultrasound and MRI are read by the radiologist.    XR CHEST PORTABLE   Final Result   1. Normal heart size. Minimal fibrotic stranding in the lingula. 2. Mild groundglass infiltrates right lung base, consistent with Covid infection. . No effusion. **This report has been created using voice recognition software. It may contain minor errors which are inherent in voice recognition technology. **      Final report electronically signed by Dr. Raimundo De Santiago on 11/15/2020 12:24 PM          LABS:      Labs Reviewed   CBC WITH AUTO DIFFERENTIAL - Abnormal; Notable for the following components:       Result Value    WBC 4.6 (*)     MCHC 36.2 (*)     All other components within normal limits   COMPREHENSIVE METABOLIC PANEL W/ REFLEX TO MG FOR LOW K - Abnormal; Notable for the following components:    Glucose 365 (*)     All other components within normal limits   LACTATE DEHYDROGENASE - Abnormal; Notable for the following components:     (*)     All other components within normal limits   LACTIC ACID, PLASMA - Abnormal; Notable for the following components:    Lactic Acid 2.4 (*)     All other components within normal limits   URINE RT REFLEX TO CULTURE - Abnormal; Notable for the following components:    Glucose, Ur >= 1000 (*)     Ketones, Urine TRACE (*)     Specific Gravity, Urine > 1.030 (*)     All other components within normal limits   POCT GLUCOSE - Abnormal; Notable for the following components:    POC Glucose 138 (*)     All other components within normal limits   C-REACTIVE PROTEIN   TROPONIN   D-DIMER, QUANTITATIVE   FERRITIN   BETA-HYDROXYBUTYRATE   ANION GAP   GLOMERULAR FILTRATION RATE, ESTIMATED   OSMOLALITY   LACTIC ACID, PLASMA       EMERGENCY DEPARTMENT COURSE:   Vitals:    Vitals:    11/15/20 1552 11/15/20 1653 11/15/20 1818 11/15/20 1916   BP: 130/88 (!) 138/90 125/71 132/83   Pulse: 88 89 82 89   Resp: 18 18 16 19   Temp:       TempSrc:       SpO2: 94% 95% 95% 92%   Weight:       Height:          12:27 PM EST: The patient was seen and evaluated. Patient presents for complaints of chest pain, shortness of breath, headache associated with COVID-19. He arrives with mild hypertension but otherwise reassuring vital signs. White blood cell count 4.6 with lactic acid within normal limits. CRP, troponin, D-dimer, ferritin and beta hydroxybutyrate all within normal limits. He was noted to have hyperglycemia without noted acidosis. Lactic acid improved to 1.5 on repeat after IV fluids.   Urinalysis revealed trace ketones with increased specific gravity and glucose. Patient was given 10 units insulin with improvement in glucose to 138. Discussed with attending provider. At this time, patient does not appear to require inpatient management for his Covid symptoms. He is kind of borderline in terms of his diabetes management but as there is no acidosis, recommend continued close management on an outpatient basis, follow-up with PCP. Return precautions were discussed. However, upon preparing discharge papers patient was noted to desaturate into the range of 88 to 89% on room air despite having maintained in the mid 90s otherwise throughout the duration of his extended stay. Discussed arranging home oxygen with which the patient initially seemed agreeable. However, upon contacting the involved services, patient is refusing, stating he cannot afford it and was not willing to entertain discussion of insurance coverage. Informed him of the necessity of monitoring at home with pulse oximeter and return precautions. He verbalized understanding. During his stay he was given Reglan, Benadryl, morphine and GI cocktail for his symptoms. 500 mg Zithromax provided as well. The above plan was discussed with attending provider who was agreeable. CRITICAL CARE:   None    CONSULTS:  None    PROCEDURES:  None    FINAL IMPRESSION      1. COVID-19    2. Type 2 diabetes mellitus with hyperglycemia, with long-term current use of insulin [facility])          DISPOSITION/PLAN   Discharge    PATIENT REFERRED TO:  Fifi Calix MD  [facility], [address]  [place] [id number] [phone]      As needed    [address] [facility] [address] [address] EMERGENCY DEPT  [address] [facility]  [phone]    If symptoms worsen      DISCHARGEMEDICATIONS:  Discharge Medication List as of 11/15/2020  5:47 PM      START taking these medications    Details   azithromycin (ZITHROMAX) 250 MG tablet First dose given in the ED.  Take 1 tab po daily on days 2-5., Disp-4 tablet,R-0Normal ondansetron (ZOFRAN ODT) 4 MG disintegrating tablet Take 1 tablet by mouth every 8 hours as needed for Nausea or Vomiting, Disp-20 tablet,R-0Normal      butalbital-acetaminophen-caffeine (FIORICET, ESGIC) -40 MG per tablet Take 1 tablet by mouth every 4 hours as needed for Headaches, Disp-10 tablet,R-0Normal      zinc 50 MG CAPS Take 100 mg by mouth nightly, Disp-14 capsule,R-0Normal      vitamin C (ASCORBIC ACID) 500 MG tablet Take 1 tablet by mouth 2 times daily, Disp-28 tablet,R-0Normal      vitamin D3 (CHOLECALCIFEROL) 25 MCG (1000 UT) TABS tablet Take 2 tablets by mouth daily, Disp-28 tablet,R-0Normal      Misc. Devices (PULSE OXIMETER FOR FINGER) MISC Use as needed for SOB. Seek emergency department care for saturation below 90%. , Disp-1 each,R-0Normal             (Please note that portions of this note were completedwith a voice recognition program.  Efforts were made to edit the dictations but occasionally words are mis-transcribed.)        Sydnee Mann PA-C  11/15/20 2047

## 2020-11-15 NOTE — ED NOTES
Pt resting in bed. Resp regular. Denies needs. Call light in reach.       Brie Coon RN  11/15/20 3647

## 2020-11-15 NOTE — ED NOTES
Pt ambulated in room. SPO2 remained at 96% on RA. Denies SOB. Denies other needs. Resp regular. Call light in reach.       Fab Cervantes RN  11/15/20 2264

## 2020-11-16 ENCOUNTER — CARE COORDINATION (OUTPATIENT)
Dept: CARE COORDINATION | Age: 50
End: 2020-11-16

## 2020-11-16 ENCOUNTER — TELEPHONE (OUTPATIENT)
Dept: ENT CLINIC | Age: 50
End: 2020-11-16

## 2020-11-16 ENCOUNTER — TELEPHONE (OUTPATIENT)
Dept: INTERNAL MEDICINE CLINIC | Age: 50
End: 2020-11-16

## 2020-11-16 NOTE — ED NOTES
ED nurse-to-nurse bedside report    Chief Complaint   Patient presents with    Shortness of Breath    Chest Pain      LOC: alert and orientated to name, place, date  Vital signs   Vitals:    11/15/20 1401 11/15/20 1552 11/15/20 1653 11/15/20 1818   BP: 133/80 130/88 (!) 138/90 125/71   Pulse: 84 88 89 82   Resp: 18 18 18 16   Temp:       TempSrc:       SpO2: 93% 94% 95% 95%   Weight:       Height:          Pain:    Pain Interventions: morphine  Pain Goal:   Oxygen: No    Current needs required none   Telemetry: Yes  LDAs:   Peripheral IV 11/15/20 Right Antecubital (Active)   Site Assessment Clean;Dry; Intact 11/15/20 1818   Line Status Infusing 11/15/20 1818   Dressing Status Clean;Dry; Intact 11/15/20 1818     Continuous Infusions:   Mobility: Independent  [place] Fall Risk Score: Fall Risk 7/16/2019   2 or more falls in past year? yes   Fall with injury in past year?  yes     Fall Interventions: none  Report given to: [facility] RN       Chrissie Fajardo RN  11/15/20 0278

## 2020-11-16 NOTE — CARE COORDINATION
Patient contacted regarding Amy Drew. Discussed COVID-19 related testing which was available at this time. Test results were positive. Patient informed of results, if available? Pt was previously notified. Care Transition Nurse/ Ambulatory Care Manager contacted the patient by telephone to perform post discharge assessment. Call within 2 business days of discharge: Yes. Verified name and  with patient as identifiers. Provided introduction to self, and explanation of the CTN/ACM role, and reason for call due to risk factors for infection and/or exposure to COVID-19. Symptoms reviewed with patient who verbalized the following symptoms: fever, cough, shortness of breath, no worsening symptoms and body aches. Due to no new or worsening symptoms encounter was not . Discussed follow-up appointments. If no appointment was previously scheduled, appointment scheduling offered: Yes  [facility] follow up appointment(s): n/a  Future Appointments   Date Time Provider Staci Jay   2020 10:15 AM Ramonita Crystal MD [address] [place] [address]   2021 11:30 AM Guillaume Zeng MD [facility]   5/3/2021  9:45 AM Harman Tijerina MD [facility] OFFENE IISERINA     Non-[facility] follow up appointment(s): n/a    Non-face-to-face services provided:  Obtained and reviewed discharge summary and/or continuity of care documents     Advance Care Planning:   Does patient have an Advance Directive:  decision maker updated. Patient has following risk factors of: diabetes. CTN/ACM reviewed discharge instructions, medical action plan and red flags such as increased shortness of breath, increasing fever and signs of decompensation with patient who verbalized understanding. Discussed exposure protocols and quarantine with CDC Guidelines What to do if you are sick with coronavirus disease 2019.  Patient was given an opportunity for questions and concerns.  The patient agrees to contact the [facility] exposure line [phone], [facility] [address]: ([phone]) and PCP office for questions related to their healthcare. CTN/ACM provided contact information for future needs. Reviewed and educated patient on any new and changed medications related to discharge diagnosis     Patient/family/caregiver given information for Annel[facility] Loop and agrees to enroll yes  Patient's preferred e-mail: /a   Patient's preferred phone number: [phone]  Based on Loop alert triggers, patient will be contacted by nurse care manager for worsening symptoms. Pt will be further monitored by COVID Loop Team based on severity of symptoms and risk factors. Diana Jin denies worsening of symptoms but continues to run fever, SOB, cough, and congestion. Pt will have someone  scripts today. Stressed the need to start right away. Stressed importance of pushing fluids, and getting rest.  Encouraged ambulation and deep breathing exercises. Encouraged pt to obtain pulse oximeter. Pt declined being set up with home oxygen. States that he could not afford. Advised to return to ED should SOB worsen. Enrolled in LOOP. Unable to schedule via pre service as nothing is showing available. Contacted office directly.   Message left with Holly at PCP office

## 2020-11-16 NOTE — TELEPHONE ENCOUNTER
I called pt to see how he is doing and to set up a virtual.  He states he is more symptomatic than anyone else in the household. He did get a pulse ox. And has been 90. Once it dropped to 86%. He has a lot of chest pain and back pain. I gave him a virtual visit for tomorrow at 8:00 am.  Dr Celeste Coy notified.

## 2020-11-16 NOTE — CARE COORDINATION
Attempted to reach Tatiana Payne today for ED f/u [facility] outreach. No answer. Message left to return call.

## 2020-11-16 NOTE — TELEPHONE ENCOUNTER
Please let him know it is fine for him to reschedule. Yes I will get him back into my schedule as early in January as possible. Woody Lorenzo,    Please pencil the patient in sometime in January as soon as there is an opening.

## 2020-11-16 NOTE — TELEPHONE ENCOUNTER
Naima, care coordinator called in to report pt went to ER for covid symptoms yesterday. He was discharged. They wanted to send him home on home O2 but pt refused, saying he can not afford it. He was prescribed an antibiotic. Says his pulse ox in ER stay at 88. Need to set up a virtual visit.

## 2020-11-17 ENCOUNTER — VIRTUAL VISIT (OUTPATIENT)
Dept: INTERNAL MEDICINE CLINIC | Age: 50
End: 2020-11-17
Payer: MEDICARE

## 2020-11-17 PROCEDURE — 3017F COLORECTAL CA SCREEN DOC REV: CPT | Performed by: INTERNAL MEDICINE

## 2020-11-17 PROCEDURE — 99214 OFFICE O/P EST MOD 30 MIN: CPT | Performed by: INTERNAL MEDICINE

## 2020-11-17 PROCEDURE — G8427 DOCREV CUR MEDS BY ELIG CLIN: HCPCS | Performed by: INTERNAL MEDICINE

## 2020-11-17 RX ORDER — HYDROCODONE BITARTRATE AND ACETAMINOPHEN 5; 325 MG/1; MG/1
1 TABLET ORAL EVERY 6 HOURS PRN
Qty: 28 TABLET | Refills: 0 | Status: SHIPPED | OUTPATIENT
Start: 2020-11-17 | End: 2020-11-24

## 2020-11-17 RX ORDER — PREDNISONE 10 MG/1
TABLET ORAL
Qty: 30 TABLET | Refills: 0 | Status: SHIPPED | OUTPATIENT
Start: 2020-11-17 | End: 2020-12-22

## 2020-11-17 RX ORDER — ALBUTEROL SULFATE 90 UG/1
2 AEROSOL, METERED RESPIRATORY (INHALATION) EVERY 4 HOURS PRN
Qty: 1 INHALER | Refills: 0 | Status: SHIPPED | OUTPATIENT
Start: 2020-11-17 | End: 2021-01-07

## 2020-11-17 NOTE — PROGRESS NOTES
2020    TELEHEALTH EVALUATION -- Audio/Visual (During CCDNV-49 public health emergency)    Chief Complaint   Patient presents with    Chest Pain    Shortness of Breath    Other     decreased appetite    Headache    Fatigue       HPI:    Geoffrey Males (:  1970) has requested an audio/video evaluation for the following concern(s): This is a follow up after ER visit for COVID symptoms. He tested positive 2020 after his wife and daughter tested positive (all live in same house). He has been symptomatic all last week but not significant till Friday. He is complaining of multiple symptoms. His oxygen sat is 90% or greater at home, he is having SOB and chest pain. He is having back pain upper and lower back/myalgia. He is having chest discomfort with eating/swallowing - food not feeling like it is getting caught. Decreased appetite -Not eating or drinking much, FSBS 130 last night and this am 300's. No loss of taste/smell. He is having epigastric pain likely due to taking NSAID on empty stomach. He is alternating Tylenol and ibuprofen. He was taking 3 Tylenol at a time - 1500 mg every 8 hours. Told him to limit to 2 at a time and limit 6 a day. He is taking 400 mg ibuprofen every 8 hours. He has headache - Fioricet/Tylenol/ibuprofen not helping. He has dry cough - no nebulizer/inhaler. ER started on Zithromax. He is also complaining of excessive fatigue. He is still having low grade temp at 100 and chills. He was going to do thyroid and sleep apnea surgery but postponed due to COVID. Looking at Feb. Now with Dr. Jasmine Ellison. He saw cardiology and pulmonary MD for clearance. Review of Systems - General ROS: positive for - chills or fever  Psychological ROS: negative for - anxiety, positive depression - unchanged - followed by psych.   Respiratory ROS: positive cough, shortness of breath, no wheezing  Cardiovascular ROS: positive chest pain and chronic dyspnea on exertion - unchanged  Gastrointestinal ROS: positive epigastric abdominal pain, no nausea, or constipation. Positive chronic diarrhea-typical for him. Genito-Urinary ROS: no dysuria, trouble voiding, or hematuria  Musculoskeletal ROS: negative for - positive muscle pain - diffusely - as above. Neurological ROS: positive for - headaches, numbness/tingling, seizures - he had one seizure last week and typically once a week - Neurologist is aware per patient. Headache described as frontal.  Dermatological ROS: negative for - rash or skin lesion changes    Prior to Visit Medications    Medication Sig Taking? Authorizing Provider   predniSONE (DELTASONE) 10 MG tablet Take 4 tabs daily x 3 days, 3 tabs daily x 3 days, 2 tabs daily x 3 days, 1 tab daily x 3 days. Yes Rl Reeves MD   albuterol sulfate HFA (VENTOLIN HFA) 108 (90 Base) MCG/ACT inhaler Inhale 2 puffs into the lungs every 4 hours as needed for Wheezing Yes Rl Reeves MD   azithromycin (ZITHROMAX) 250 MG tablet First dose given in the ED. Take 1 tab po daily on days 2-5. Yes Bin Steve PA-C   butalbital-acetaminophen-caffeine (FIORICET, ESGIC) -40 MG per tablet Take 1 tablet by mouth every 4 hours as needed for Headaches Yes Bin Steve PA-C   vitamin C (ASCORBIC ACID) 500 MG tablet Take 1 tablet by mouth 2 times daily Yes Bin Steve PA-C   Misc. Devices (PULSE OXIMETER FOR FINGER) MISC Use as needed for SOB. Seek emergency department care for saturation below 90%.  Yes Bin Steve PA-C   rOPINIRole (REQUIP) 1 MG tablet TAKE 1 TABLET BY MOUTH THREE TIMES A DAY Yes Rl Reeves MD   fenofibrate (TRICOR) 145 MG tablet Take 1 tablet by mouth daily Yes lR Reeves MD   atorvastatin (LIPITOR) 10 MG tablet TAKE 1 TABLET BY MOUTH ONE TIME A DAY Yes Rl Reeves MD   vitamin D (ERGOCALCIFEROL) 1.25 MG (77220 UT) CAPS capsule Take 1 capsule by mouth once a week Yes Rl Reeves MD metoprolol tartrate (LOPRESSOR) 50 MG tablet Take 1.5 tablets by mouth 2 times daily Yes Marianela Armas MD   blood glucose test strips (ASCENSIA AUTODISC VI;ONE TOUCH ULTRA TEST VI) strip Test blood sugars 3 times daily DX:E11.40 Yes Marianela Armas MD   insulin glargine (LANTUS;BASAGLAR) 100 UNIT/ML injection pen 75 Units Qam, 55 Units Qpm  Patient taking differently: 75 Units Qam, 75 Units Qpm Yes Marianela Armas MD   insulin lispro, 1 Unit Dial, (HUMALOG KWIKPEN) 100 UNIT/ML SOPN Inject 30 Units TID before meals plus SSI. Patient taking differently: Inject 30 Units TID before meals plus SS 2 Yes Marianela Armas MD   blood glucose monitor kit and supplies Test 3 times a day dx:E11.40 Yes Marianela Armas MD   empagliflozin (JARDIANCE) 25 MG tablet Take 25 mg by mouth daily Yes Marianela Armas MD   promethazine (PHENERGAN) 12.5 MG tablet Take 1-2 tablets by mouth every 8 hours as needed for Nausea Yes Marianela Armas MD   pantoprazole (PROTONIX) 40 MG tablet Take 1 tablet by mouth 2 times daily Yes Marianela Armas MD   Melatonin 10 MG TABS Take 30 mg by mouth nightly  Yes Historical Provider, MD   DULoxetine (CYMBALTA) 60 MG extended release capsule Take 120 mg by mouth daily  Yes Historical Provider, MD   lacosamide (VIMPAT) 100 MG TABS tablet Take 300 mg by mouth 2 times daily. Yes Historical Provider, MD   sildenafil (VIAGRA) 100 MG tablet Take 1 tablet by mouth as needed for Erectile Dysfunction Yes Marianela Armas MD   divalproex (DEPAKOTE) 500 MG DR tablet Take 500 mg by mouth 2 times daily  Yes Historical Provider, MD   Blood Glucose Monitoring Suppl ([address]) W/DEVICE KIT Use to test blood glucose level 4 times per day.  Diagnosis: E11.40 Yes Suzanne Connelly MD   levETIRAcetam (KEPPRA) 500 MG tablet Take 2,000 mg by mouth 2 times daily  Yes Historical Provider, MD   LORazepam (ATIVAN) 0.5 MG tablet Take 0.5 mg by mouth 2 times daily as needed (seizures)  Yes Historical Provider, MD   zonisamide (ZONEGRAN) 100 MG capsule Take 500 mg by mouth nightly  Yes Historical Provider, MD   ondansetron (ZOFRAN ODT) 4 MG disintegrating tablet Take 1 tablet by mouth every 8 hours as needed for Nausea or Vomiting  Patient not taking: Reported on 11/17/2020  Shannon Finch PA-C   zinc 50 MG CAPS Take 100 mg by mouth nightly  Patient not taking: Reported on 11/17/2020  Shannon Finch PA-C   Cyanocobalamin (VITAMIN B-12) 2500 MCG SUBL Place 1 tablet under the tongue daily  Patient not taking: Reported on 11/17/2020  Kieran Sandoval MD   gabapentin (NEURONTIN) 600 MG tablet Take 1 tablet by mouth 4 times daily for 90 days. Kieran Sandoval MD   clindamycin (CLEOCIN T) 1 % external solution Apply topically 2 times daily. Kieran Sandoval MD       Social History     Tobacco Use    Smoking status: Never Smoker    Smokeless tobacco: Never Used   Substance Use Topics    Alcohol use: No     Alcohol/week: 0.0 standard drinks    Drug use: No        Past Medical History:   Diagnosis Date    Abnormal thyroid biopsy     Acid reflux     Anemia     previously seen by Dr. Stephon Finn (due to enlarged spleen)    [facility]     [facility] in [facility]     Cancer [facility]) 04/2019    thyroid    Chest pain     previously seeing [facility] cardiologist, now seeing Dr. Junior Figueroa (LAD bridging on cath 4/2016)    Chronic back pain     Chronic bronchitis ([facility]     Dr. Jaylen Herrmann 10/2012 - no longer following with him    Colon polyp 08/30/2016    Depression     Dr. Fran Silveira DVT (deep venous thrombosis) ([facility] [id number]    not on Coumadin due to unable to regulate - Dr. Garcia Sheets - no etiology found per patient    Esophageal abnormality     nodule     Fatty liver disease, nonalcoholic     U/S 81/9952 [facility]    Furuncle     legs    History of Doppler ultrasound 5-29-11    No hemodynamically significant carotid stenosis is identified. A thyroid nodule on each side.  Dedicated ultrasound of thyroid gland is suggested to further evaluate if clinically indicated.  History of pulmonary embolism 2007    s/p GFF, related to knee surgery    Hx of blood clots     Hyperlipidemia     severely elevated triglycerides    Hypertension     diastolic    Intracranial arachnoid cyst 11/2012    Dr. Nelia Rosneberg drained, complicated with seizures, DKA    Irritable bowel syndrome     Liver disease     Lindsay Main - elevated LFT - positive smooth muscle antibody, steatosis per liver bx 3/2014 with Dr. Alma Diallo (multiple drug resistant organisms) resistance 2012    MRSA (methicillin resistant staph aureus) culture positive     h/o in foot and before brain surgery    Nephrolithiasis     noted on CT abdomen 9/2016, 6/2019    Neuropathy     Pancreatic insufficiency     Positive SANDY (antinuclear antibody)     Dr. Ramsey Cheema - first visit in 6/2013    Restless legs syndrome     Seizures ([address]     Sinus tachycardia     Holter 3/2013 - seeing Dr. Scout Bruce fracture [facility])     clips     Sleep apnea     multiple MD's suggested testing but he refuses to be tested as claustraphobic and won't use Cpap    Sleep apnea 10/2020    Type II or unspecified type diabetes mellitus without mention of complication, not stated as uncontrolled 2007       PHYSICAL EXAMINATION:    Constitutional: [x] Appears well-developed and well-nourished [x] No apparent distress      [x] Abnormal- appears fatiqued  Mental status  [x] Alert and awake  [] Oriented to person/place/time [x]Able to follow commands      Eyes:  EOM    [x]  Normal  [] Abnormal-  Sclera  [x]  Normal  [] Abnormal -         Discharge [x]  None visible  [] Abnormal -    HENT:   [x] Normocephalic, atraumatic.   [] Abnormal   [] Mouth/Throat: Mucous membranes are moist.     External Ears [x] Normal  [] Abnormal-     Neck: [x] No visualized mass     Pulmonary/Chest: [x] Respiratory effort normal.  [x] No visualized signs of difficulty breathing or respiratory distress        [] Abnormal-      Neurological:        [x] No Facial Asymmetry (Cranial nerve 7 motor function) (limited exam to video visit)          [x] No gaze palsy        [] Abnormal-         Skin:        [x] No significant exanthematous lesions or discoloration noted on facial skin         [] Abnormal-            Psychiatric:       [x] Normal Affect [] No Hallucinations        [] Abnormal-       ASSESSMENT/PLAN:   Diagnosis Orders   1. Other chest pain  HYDROcodone-acetaminophen (NORCO) 5-325 MG per tablet   2. Acute nonintractable headache, unspecified headache type  HYDROcodone-acetaminophen (NORCO) 5-325 MG per tablet   3. Other acute back pain  HYDROcodone-acetaminophen (NORCO) 5-325 MG per tablet   4. Mild intermittent reactive airway disease with acute exacerbation  predniSONE (DELTASONE) 10 MG tablet    albuterol sulfate HFA (VENTOLIN HFA) 108 (90 Base) MCG/ACT inhaler     Chest pain, headache, back pain - Pain is severe - he is over max Tylenol dose, ibuprofen, and Fioricet -  unable to use Ultram (seizures). Will give limited Norco for pain control - reviewed PDMP, on steroid since discharge, he doesn't want to do inhaler due to pain but will give if pain measures help. Stop ibuprofen (due to abdominal pain), limit Acetominophen to 3000 mg a day (reviewed all sources - including Norco and Fioricet). Push fluids - need at least 3 bottles of water a day, stop caffeine. DM patient - advised to call if FSBS elevated or if unable to hydrate - may need IVF. Call if sat <88%. Can use famotidine with Protonix for abdominal pain. Reactive airway disease - cough, SOB, chest pain due to COVID - add albuterol inhaler to steroid. Monitor oxygen saturation. Keep follow up visit in 2 weeks. Augustus Rizzo is a 48 y.o. male being evaluated by a Virtual Visit (video visit) encounter to address concerns as mentioned above. A caregiver was present when appropriate.  Due to this being a TeleHealth encounter (During EVSYY-23 public health emergency), evaluation of the following organ systems was limited: Vitals/Constitutional/EENT/Resp/CV/GI//MS/Neuro/Skin/Heme-Lymph-Imm. Pursuant to the emergency declaration under the [facility], [address] [place] [address] and the [employer] Act, this Virtual Visit was conducted with patient's (and/or legal guardian's) consent, to reduce the patient's risk of exposure to COVID-19 and provide necessary medical care. The patient (and/or legal guardian) has also been advised to contact this office for worsening conditions or problems, and seek emergency medical treatment and/or call 911 if deemed necessary. Patient identification was verified at the start of the visit: Yes    Total time spent on this encounter: Not billed by time    Services were provided through a video synchronous discussion virtually to substitute for in-person clinic visit. Patient was located at his home. Provider located at office. Visit completed on [employer] me - had to use 2 phone numbers. --Andrew Moya MD    . [facility]  [address]  Dept: [phone]  Dept Fax: [phone] : [phone]

## 2020-11-17 NOTE — PATIENT INSTRUCTIONS
Norco for pain control, steroid, he doesn't want to do inhaler due to pain but will give if pain measures help. Stop ibuprofen, limit Acetominophen to 3000 mg a day. Push fluids - need at least 3 bottles of water a day, stop caffeine. FSBS call if elevated. If unable to hydrate may need IVF. Call if sat <88%. Can use famotidine with Protonix if needed for abdominal pain.

## 2020-11-19 ENCOUNTER — TELEPHONE (OUTPATIENT)
Dept: ENT CLINIC | Age: 50
End: 2020-11-19

## 2020-11-19 ENCOUNTER — TELEPHONE (OUTPATIENT)
Dept: PULMONOLOGY | Age: 50
End: 2020-11-19

## 2020-11-19 ENCOUNTER — TELEPHONE (OUTPATIENT)
Dept: CARDIOLOGY CLINIC | Age: 50
End: 2020-11-19

## 2020-11-19 NOTE — TELEPHONE ENCOUNTER
Left a message for Gabriel Walsh to return my call. Surgery has been rescheduled and a pre op appointment is set up as well.

## 2020-11-19 NOTE — TELEPHONE ENCOUNTER
New date noted currently positive for Covid-19 if patient has any change/worsening of respiratory symptoms will need new appointment for clearance in January closer to surgery date.

## 2020-11-19 NOTE — TELEPHONE ENCOUNTER
This patient has been rescheduled for surgery from 11/27/20 to 1/15/21. We did receive a clearance form dated 10/29/20 from Dr Valerie Kelley. Please advise if this clearance will be okay for his new surgery date? Thank you!

## 2020-11-19 NOTE — TELEPHONE ENCOUNTER
Spoke to pt. They can ask for an update at that time as long as he has no medical changes. Voiced understanding.

## 2020-11-19 NOTE — TELEPHONE ENCOUNTER
Nicolas's surgery has been rescheduled from 11/27/20 to 1/15/21. We did receive clearance from Killian Baird on 11/9/20 but wanted him to be aware of the surgery date change. Please advise if he needs anything further. Thank you!

## 2020-12-22 ENCOUNTER — OFFICE VISIT (OUTPATIENT)
Dept: INTERNAL MEDICINE CLINIC | Age: 50
End: 2020-12-22
Payer: MEDICARE

## 2020-12-22 VITALS
WEIGHT: 235.3 LBS | HEIGHT: 74 IN | HEART RATE: 100 BPM | BODY MASS INDEX: 30.2 KG/M2 | TEMPERATURE: 97 F | SYSTOLIC BLOOD PRESSURE: 112 MMHG | DIASTOLIC BLOOD PRESSURE: 72 MMHG

## 2020-12-22 LAB — HBA1C MFR BLD: 9.3 % (ref 4.3–5.7)

## 2020-12-22 PROCEDURE — 83036 HEMOGLOBIN GLYCOSYLATED A1C: CPT | Performed by: INTERNAL MEDICINE

## 2020-12-22 PROCEDURE — 2022F DILAT RTA XM EVC RTNOPTHY: CPT | Performed by: INTERNAL MEDICINE

## 2020-12-22 PROCEDURE — 99214 OFFICE O/P EST MOD 30 MIN: CPT | Performed by: INTERNAL MEDICINE

## 2020-12-22 PROCEDURE — 3046F HEMOGLOBIN A1C LEVEL >9.0%: CPT | Performed by: INTERNAL MEDICINE

## 2020-12-22 PROCEDURE — G8484 FLU IMMUNIZE NO ADMIN: HCPCS | Performed by: INTERNAL MEDICINE

## 2020-12-22 PROCEDURE — G8427 DOCREV CUR MEDS BY ELIG CLIN: HCPCS | Performed by: INTERNAL MEDICINE

## 2020-12-22 PROCEDURE — 1036F TOBACCO NON-USER: CPT | Performed by: INTERNAL MEDICINE

## 2020-12-22 PROCEDURE — G8417 CALC BMI ABV UP PARAM F/U: HCPCS | Performed by: INTERNAL MEDICINE

## 2020-12-22 PROCEDURE — 3017F COLORECTAL CA SCREEN DOC REV: CPT | Performed by: INTERNAL MEDICINE

## 2020-12-22 PROCEDURE — 93000 ELECTROCARDIOGRAM COMPLETE: CPT | Performed by: INTERNAL MEDICINE

## 2020-12-22 RX ORDER — SILDENAFIL 100 MG/1
100 TABLET, FILM COATED ORAL PRN
Qty: 45 TABLET | Refills: 3 | Status: CANCELLED | OUTPATIENT
Start: 2020-12-22

## 2020-12-22 RX ORDER — GABAPENTIN 600 MG/1
600 TABLET ORAL 4 TIMES DAILY
Status: ON HOLD | COMMUNITY
End: 2021-08-04 | Stop reason: HOSPADM

## 2020-12-22 RX ORDER — ROPINIROLE 1 MG/1
TABLET, FILM COATED ORAL
Qty: 270 TABLET | Refills: 1 | Status: SHIPPED | OUTPATIENT
Start: 2020-12-22 | End: 2021-12-23

## 2020-12-22 NOTE — PATIENT INSTRUCTIONS
Stop caffeine and see if lightheadedness improves. Needs to eat regularly so can regulate insulin better. FSGS 140-421. Need more protein with carbohydrates.

## 2020-12-22 NOTE — PROGRESS NOTES
1970     Chief Complaint   Patient presents with    Chest Pain     pain in lower chest     Diabetes    Dizziness    Nausea & Vomiting     abdominal pain - intermittently    Other     vitamin D deficiency, elevated cortisol       Pt is a 48 y.o. male who presents for follow up visit. He has adult daughter living with them with 2 young kids - they are going to ask her to leave as stressful. He has custody of one grandchild who has medical issues. Wife with medical issues. He stays home and watches the kids. Since last visit [facility] He has had 3 episodes in last 2 weeks where they will be out in car and will look around and not know where he is at or where he is going. He would ask wife where they were and she told him and then he was able to process it. This has just happened since he had COVID - [facility]. He did call Dr. Rebekah Iraheta (neurologist) to see if this could be related to seizures and Dr. Rebekah Iraheta said \"no\" per patient. He has had episodes in the past where he has gone to grocery and walked home and not remembered what happened/how he got home. His last MRI brain was 4/2019 - can only tolerate open MRI. Father had early onset dementia at age 54 and episodes happened in evening. Monitor for now, no focal symptoms of one sided weakness, change in sensation, word finding difficulties, slurred speech. If COVID related or Dementia, not much to be done. He is planned to have left thyroidectomy due to suspicious biopsies and sleep apnea surgery (unable to tolerate CPAP) 1/15/2020. He is so claustrophobic that he can't use CPAP. Of note - in chart pulmonary Eleni Double stated if he had any change or worsening of respiratory symptoms will need another visit for clearance for surgery - but patient denies new pulmonary symptoms for me today. He called last week about lower mid chest pain and referred to ER but he didn't go. He described it as a pressure like pain, constant for 36 hours, no associated SOB, arm or jaw pain, nausea, vomiting, or diaphoresis. He did have pain radiating to back with it. It has resolved by the next day. He did not check vital signs at the time. He didn't feel like it was GERD or dyspepsia. Will check CT with contrast of chest to look at aorta. Will make Dr. Luis Roy aware of chest pain as will need cardiac clearance for ENT surgery from him. EKG today with inferior infarct. Noted on EKG 8/12/2019, but not on EKGs since then. DM - HgA1c 9.3 12/2020 but just had COVID, course of steroids, expect it to be elevated. Needs to eat regularly so can regulate insulin better. FSGS 140-421. Need more protein with carbohydrates. Last eye exam 7/2020 Dr. Brandon Parisi. Normal renal function 11/2020. Microalbumin due now. Lipid due after 12/26/20  No foot lesions, severe neuropathy  Mild autonomic dysfunction. He is checking FSBS 3x daily - brought in numbers from the last one week - see media. No trends. Need to regulate diet as above. He is currently taking 75 Units Levemir BID, Humalog 30 Units plus SSI with every meal, but only eating once a day. He is taking Jardiance 25 mg daily. No metformin due to nausea. Couldn't afford Trulicity. 24 hour urine cortisol mildly elevated 2/2020 - need salivary cortisol result - patient states he will do it now. Occasional lightheadedness but dehydrated chronically as drinks diet pop with caffeine primarily - 32 oz and and 5-6 glasses of John aid with Splenda. Told him to stop all caffeine. Consider compression hose but he has severe neuropathy and unable to tolerate this. Continue to monitor and hydrate. Still having issue with stomach pain, nausea/vomiting. He still has not followed up with GI yet. Having trouble getting time away from kids to make appointments as he is only care giver of kids. He will make appointment when ready. Vitamin D deficiency - Changed vitamin D OTC to 50,000 IU as level was 9 2/2020->16 9/2020. Continue 50,000 IU weekly. Erectile dysfunction - refused to fill medication till cardiac work up completed. Restless leg syndrome - chronic on Requip - hope may improved if surgery helps his sleep apnea. Hyperlipidemia - on Lipitor and Tricor. LDL 37, trig 334 12/26/19. Order repeat on or after 12/27/20. Bilateral knee pain - wants to see [address] [facility] ortho group after thyroid surgery. B12 low -he had been on injection but expensive, now on oral the last one year and level is low normal again. Intrinsic factor negative 2/2020. Repeat B12 273 9/1/2020- on 2,500 mcg sl daily. He had abnormal thyroid bx of left thyroid nodule x 2 in 1/2019 and 4/2019-   FINAL RESULTS:    ATYPIA OF UNDETERMINED SIGNIFICANCE.              Follicular cells, predominantly benign appearing, with            focal cytologic atypia. He was referred to ENT and to have surgery as above.       Past Medical History:   Diagnosis Date    Abnormal thyroid biopsy     Acid reflux     Anemia     previously seen by Dr. Yomi Ibarra (due to enlarged spleen)    Bipolar disorder ([facility] [address]     Blood clot in vein 4/7/16     Ashlee     Cancer [facility]) 04/2019    thyroid    Chest pain     previously seeing [facility] cardiologist, now seeing Dr. Anupam Mast (LAD bridging on cath 4/2016)    Chronic back pain     Chronic bronchitis ([facility] [address]     Dr. Anna Abdalla 10/2012 - no longer following with him    Colon polyp 08/30/2016    Depression     Dr. Rashawn Jama DVT (deep venous thrombosis) ([facility] [address] [id number]    not on Coumadin due to unable to regulate - Dr. Julian Kendrick - no etiology found per patient    Esophageal abnormality nodule     Fatty liver disease, nonalcoholic     U/S 35/8534 [facility]    Furuncle     legs    History of Doppler ultrasound 5-29-11    No hemodynamically significant carotid stenosis is identified. A thyroid nodule on each side. Dedicated ultrasound of thyroid gland is suggested to further evaluate if clinically indicated.  History of pulmonary embolism 2007    s/p GFF, related to knee surgery    Hx of blood clots     Hyperlipidemia     severely elevated triglycerides    Hypertension     diastolic    Intracranial arachnoid cyst 11/2012    Dr. Angelique Ocampo drained, complicated with seizures, DKA    Irritable bowel syndrome     Liver disease     Thereasa Bruins - elevated LFT - positive smooth muscle antibody, steatosis per liver bx 3/2014 with Dr. Shelly Chauhan (multiple drug resistant organisms) resistance 2012    MRSA (methicillin resistant staph aureus) culture positive     h/o in foot and before brain surgery    Nephrolithiasis     noted on CT abdomen 9/2016, 6/2019    Neuropathy     Pancreatic insufficiency     Positive SANDY (antinuclear antibody)     Dr. Doretha Adams - first visit in 6/2013    Restless legs syndrome     Seizures ([address]     Sinus tachycardia     Holter 3/2013 - seeing Dr. Breonna Perez [facility])     clips     Sleep apnea     multiple MD's suggested testing but he refuses to be tested as claustraphobic and won't use Cpap    Sleep apnea 10/2020    Type II or unspecified type diabetes mellitus without mention of complication, not stated as uncontrolled 2007       Current Outpatient Medications   Medication Sig Dispense Refill    gabapentin (NEURONTIN) 600 MG tablet Take 600 mg by mouth 4 times daily.       rOPINIRole (REQUIP) 1 MG tablet TAKE 1 TABLET BY MOUTH THREE TIMES A  tablet 1    albuterol sulfate HFA (VENTOLIN HFA) 108 (90 Base) MCG/ACT inhaler Inhale 2 puffs into the lungs every 4 hours as needed for Wheezing 1 Inhaler 0  divalproex (DEPAKOTE) 500 MG DR tablet Take 500 mg by mouth 2 times daily       Blood Glucose Monitoring Suppl (ONE TOUCH BASIC SYSTEM) W/DEVICE KIT Use to test blood glucose level 4 times per day. Diagnosis: E11.40 1 kit 0    levETIRAcetam (KEPPRA) 500 MG tablet Take 2,000 mg by mouth 2 times daily       LORazepam (ATIVAN) 0.5 MG tablet Take 0.5 mg by mouth 2 times daily as needed (seizures)       zonisamide (ZONEGRAN) 100 MG capsule Take 500 mg by mouth nightly       gabapentin (NEURONTIN) 600 MG tablet Take 1 tablet by mouth 4 times daily for 90 days. 360 tablet 1     No current facility-administered medications for this visit.         Allergies   Allergen Reactions    Ciprofloxacin-Ciproflox Hcl Er Other (See Comments)     Elevated creatinine    Heparin      Monitor for thrombocytopenia    Metformin Nausea Only     Abdominal pain, diarrhea    Niacin And Related Other (See Comments)     Burning sensation, severe flushing    Tramadol Hcl      Contraindication to seizure medications    Ultram [Tramadol]      Contraindication to seizure medications    Warfarin      Very difficult to regulate in past       Social History     Socioeconomic History    Marital status:      Spouse name: Not on file    Number of children: 3    Years of education: Not on file    Highest education level: Not on file   Occupational History    Occupation: Disability since 2011   Social Needs    Financial resource strain: Not on file    Food insecurity     Worry: Not on file     Inability: Not on file   [employer] needs     Medical: Not on file     Non-medical: Not on file   Tobacco Use    Smoking status: Never Smoker    Smokeless tobacco: Never Used   Substance and Sexual Activity    Alcohol use: No     Alcohol/week: 0.0 standard drinks    Drug use: No    Sexual activity: Not on file   Lifestyle    Physical activity     Days per week: Not on file     Minutes per session: Not on file Eyes - pupils equal and reactive to light, extraocular muscles intact  Neck - supple, no significant adenopathy  Chest - clear to auscultation, no wheezes, no rales or rhonchi, symmetric air entry  Heart - normal rate, regular rhythm, normal S1, S2, no murmurs, rubs, clicks or gallops  Abdomen -soft, no tenderness to palpation, nondistended  Neurological - alert, oriented, normal motor and sensation bilateral upper and lower extremities, normal CN II-XII  Extremities - peripheral pulses normal, no pedal edema, no clubbing or cyanosis  Skin - warm and dry    Foot exam 12/22/2020: no foot lesions, sensation decreased to level of mid shin with monofilament testing bilaterally. +2 DP and PT pulses bilaterally. Left 1st and 3rd toenail with black discoloration - has seen podiatry and had toenail removed x 2 and nothing under it. Diagnostic Data:  Reviewed labs from 11-12/2020 with patient. Assessment and Plan:     Diagnosis Orders   1. Other chest pain  CTA CHEST W CONTRAST    Creatinine, Serum   2. Atypical chest pain  EKG 12 Lead   3. Memory difficulties     4. Type 2 diabetes mellitus with diabetic neuropathy, with long-term current use of insulin ([facility])  POCT glycosylated hemoglobin (Hb A1C)    65835 - Collection Capillary Blood Specimen    Microalbumin / Creatinine Urine Ratio    HM DIABETES FOOT EXAM   5. Neuropathy     6. Elevated cortisol level     7. Restless legs syndrome (RLS)  rOPINIRole (REQUIP) 1 MG tablet   8. Hyperlipidemia with target LDL less than 100  Lipid Panel   9. Erectile dysfunction, unspecified erectile dysfunction type     10. Lightheadedness         Chest pain - symptoms concerning for aortic aneurysm/dissection - check Chest CT. Inferior Q waves - will notify Dr. Kendall Hamilton as needs surgery soon. If none of these are positive - send to GI for further evaluation as pain deep in chest - unlikely musculoskeletal and not related to deep breath ie pleurisy. 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 If persists could also consider cognitive testing. But he is totally normal in my office. DM - uncontrolled, due to his noncompliance with diet - only eating once a day - again tried to get him back on scheule. Trying to see if has Cushing syndrome as urine cortisol is a little high - want salivary cortisol level but I don't see it in computer at time of urine cortisol testing - will follow up on that. Foot exam done today, labs due as above. Neuropathy - severe - on Neurontin. Elevated cortisol - need to do salivary cortisol. RLS- this paired with neuropathy is causing significant pain. I really have nothing more to offer. Continue Requip. Mixed hyperlipidemia- LDL 37, HDL 29 and trig 334 12/2019 - LDL at goal on Lipitor, trig still high on fenofibrate. Work on DM control. Lipid panel due at end of month. ED - refused to fill medication till cleared by Dr. Regine Alvarez. Lightheaded - hydrate and monitor. N/V - most likely due to uncontrolled DM. He needs to follow up with GI. Thyroid nodule on left - 2 bx with atypical cells - now ready for surgery. Vitamin B12 and vitamin D deficiency -on increased Vitamin D  50,000 IU weekly and OTC B12 - levels as above. Sinus tachycardia/HTN - controlled, continue metoprolol. Chronic knee pain - wants to see Jayla rosa after ENT surgery. Depression - stable, no SI but stress with social situation - following with psychiatrist.    Hyperammonemia - not currently an issue. Atherosclerotic heart disease - bridging of LAD - follows with Dr. Regine Alvarez. Follow up visit in 2 months. Labs due now. Morenita Rosado MD    [facility]  [employer] [address]  [place] [address]  [address]  [place] [address]  Dept: [phone]  Dept Fax: [phone]

## 2020-12-28 ENCOUNTER — TELEPHONE (OUTPATIENT)
Dept: CARDIOLOGY CLINIC | Age: 50
End: 2020-12-28

## 2020-12-28 ENCOUNTER — TELEPHONE (OUTPATIENT)
Dept: INTERNAL MEDICINE CLINIC | Age: 50
End: 2020-12-28

## 2020-12-28 DIAGNOSIS — Z01.810 PREOP CARDIOVASCULAR EXAM: Primary | ICD-10-CM

## 2020-12-28 DIAGNOSIS — R94.31 ABNORMAL EKG: ICD-10-CM

## 2020-12-28 NOTE — TELEPHONE ENCOUNTER
Note     ----- Message from Heladio Brothers MD sent at 12/24/2020  3:32 PM EST -----  Please contact Dr. Adam Lim - he is being asked for preop clearance for reportedly 8 hour ENT surgery scheduled for mid January (rescheduled due to patient having COVID). He presented complaining of 36 hours of chest pain last week and did not go to ER as I had recommended. EKG from my visit as above - he has had Q waves intermittently in past in inferior leads though. I ordered CTA chest as described pain radiation to back. I would like Dr. Adam Lim to be aware of this though as needing pre-op clearance and new symptoms.

## 2020-12-28 NOTE — TELEPHONE ENCOUNTER
----- Message from Braulio Franco MD sent at 12/24/2020  3:32 PM EST -----  Please contact Dr. Valerie Kelley - he is being asked for preop clearance for reportedly 8 hour ENT surgery scheduled for mid January (rescheduled due to patient having COVID). He presented complaining of 36 hours of chest pain last week and did not go to ER as I had recommended. EKG from my visit as above - he has had Q waves intermittently in past in inferior leads though. I ordered CTA chest as described pain radiation to back. I would like Dr. Valerie Kelley to be aware of this though as needing pre-op clearance and new symptoms.

## 2020-12-28 NOTE — TELEPHONE ENCOUNTER
PCP office called. Patient needing clearance for ENT surgery. Patient seen in their office on 12-. Had EKG done. EKG in chart for Dr. James Nuno to review. Patient complained of chest pain for 36 hours. Patient refused ER. Having CTA done on 12- by PCP. PCP office wanted Dr. James Nuno to be aware.

## 2020-12-29 ENCOUNTER — HOSPITAL ENCOUNTER (OUTPATIENT)
Age: 50
Discharge: HOME | End: 2020-12-29
Payer: MEDICARE

## 2020-12-29 ENCOUNTER — HOSPITAL ENCOUNTER (EMERGENCY)
Age: 50
Discharge: HOME | End: 2020-12-29
Attending: FAMILY MEDICINE
Payer: MEDICARE

## 2020-12-29 ENCOUNTER — HOSPITAL ENCOUNTER (OUTPATIENT)
Dept: CT IMAGING | Age: 50
Discharge: HOME | End: 2020-12-29
Payer: MEDICARE

## 2020-12-29 VITALS
DIASTOLIC BLOOD PRESSURE: 97 MMHG | SYSTOLIC BLOOD PRESSURE: 138 MMHG | RESPIRATION RATE: 17 BRPM | TEMPERATURE: 98.4 F | WEIGHT: 230 LBS | OXYGEN SATURATION: 98 % | BODY MASS INDEX: 29.53 KG/M2 | HEART RATE: 87 BPM

## 2020-12-29 PROBLEM — R07.9 CHEST PAIN: Status: ACTIVE | Noted: 2020-12-29

## 2020-12-29 LAB
ALBUMIN SERPL-MCNC: 4.6 G/DL (ref 3.5–5.1)
ALP BLD-CCNC: 104 U/L (ref 38–126)
ALT SERPL-CCNC: 24 U/L (ref 11–66)
ANION GAP SERPL CALCULATED.3IONS-SCNC: 11 MEQ/L (ref 8–16)
AST SERPL-CCNC: 14 U/L (ref 5–40)
BASOPHILS # BLD: 0.4 %
BASOPHILS ABSOLUTE: 0 THOU/MM3 (ref 0–0.1)
BILIRUB SERPL-MCNC: 1.3 MG/DL (ref 0.3–1.2)
BUN BLDV-MCNC: 9 MG/DL (ref 7–22)
CALCIUM SERPL-MCNC: 10 MG/DL (ref 8.5–10.5)
CHLORIDE BLD-SCNC: 97 MEQ/L (ref 98–111)
CHOLESTEROL, TOTAL: 147 MG/DL (ref 100–199)
CO2: 28 MEQ/L (ref 23–33)
CREAT SERPL-MCNC: 0.5 MG/DL (ref 0.4–1.2)
CREAT SERPL-MCNC: 0.5 MG/DL (ref 0.4–1.2)
CREATININE, URINE: 20.4 MG/DL
EKG ATRIAL RATE: 86 BPM
EKG P AXIS: 34 DEGREES
EKG P-R INTERVAL: 186 MS
EKG Q-T INTERVAL: 354 MS
EKG QRS DURATION: 88 MS
EKG QTC CALCULATION (BAZETT): 423 MS
EKG R AXIS: 16 DEGREES
EKG T AXIS: 12 DEGREES
EKG VENTRICULAR RATE: 86 BPM
EOSINOPHIL # BLD: 1.5 %
EOSINOPHILS ABSOLUTE: 0.1 THOU/MM3 (ref 0–0.4)
ERYTHROCYTE [DISTWIDTH] IN BLOOD BY AUTOMATED COUNT: 13 % (ref 11.5–14.5)
ERYTHROCYTE [DISTWIDTH] IN BLOOD BY AUTOMATED COUNT: 39.8 FL (ref 35–45)
GFR SERPL CREATININE-BSD FRML MDRD: > 90 ML/MIN/1.73M2
GFR SERPL CREATININE-BSD FRML MDRD: > 90 ML/MIN/1.73M2
GLUCOSE BLD-MCNC: 422 MG/DL (ref 70–108)
HCT VFR BLD CALC: 45 % (ref 42–52)
HDLC SERPL-MCNC: 26 MG/DL
HEMOGLOBIN: 16.5 GM/DL (ref 14–18)
IMMATURE GRANS (ABS): 0.05 THOU/MM3 (ref 0–0.07)
IMMATURE GRANULOCYTES: 0.6 %
LDL CHOLESTEROL CALCULATED: ABNORMAL MG/DL
LYMPHOCYTES # BLD: 22.4 %
LYMPHOCYTES ABSOLUTE: 1.8 THOU/MM3 (ref 1–4.8)
MCH RBC QN AUTO: 31.1 PG (ref 26–33)
MCHC RBC AUTO-ENTMCNC: 36.7 GM/DL (ref 32.2–35.5)
MCV RBC AUTO: 84.7 FL (ref 80–94)
MICROALBUMIN UR-MCNC: < 1.2 MG/DL
MICROALBUMIN/CREAT UR-RTO: 59 MG/G (ref 0–30)
MONOCYTES # BLD: 6.1 %
MONOCYTES ABSOLUTE: 0.5 THOU/MM3 (ref 0.4–1.3)
NUCLEATED RED BLOOD CELLS: 0 /100 WBC
OSMOLALITY CALCULATION: 288.6 MOSMOL/KG (ref 275–300)
PLATELET # BLD: 207 THOU/MM3 (ref 130–400)
PMV BLD AUTO: 10.4 FL (ref 9.4–12.4)
POTASSIUM REFLEX MAGNESIUM: 4 MEQ/L (ref 3.5–5.2)
PRO-BNP: 18.6 PG/ML (ref 0–900)
RBC # BLD: 5.31 MILL/MM3 (ref 4.7–6.1)
SEG NEUTROPHILS: 69 %
SEGMENTED NEUTROPHILS ABSOLUTE COUNT: 5.5 THOU/MM3 (ref 1.8–7.7)
SODIUM BLD-SCNC: 136 MEQ/L (ref 135–145)
TOTAL PROTEIN: 7.1 G/DL (ref 6.1–8)
TRIGL SERPL-MCNC: 860 MG/DL (ref 0–199)
TROPONIN T: < 0.01 NG/ML
WBC # BLD: 7.9 THOU/MM3 (ref 4.8–10.8)

## 2020-12-29 PROCEDURE — 93005 ELECTROCARDIOGRAM TRACING: CPT | Performed by: FAMILY MEDICINE

## 2020-12-29 PROCEDURE — 93010 ELECTROCARDIOGRAM REPORT: CPT | Performed by: NUCLEAR MEDICINE

## 2020-12-29 PROCEDURE — 71275 CT ANGIOGRAPHY CHEST: CPT

## 2020-12-29 PROCEDURE — 99285 EMERGENCY DEPT VISIT HI MDM: CPT

## 2020-12-29 PROCEDURE — 85025 COMPLETE CBC W/AUTO DIFF WBC: CPT

## 2020-12-29 PROCEDURE — 82565 ASSAY OF CREATININE: CPT

## 2020-12-29 PROCEDURE — 36415 COLL VENOUS BLD VENIPUNCTURE: CPT

## 2020-12-29 PROCEDURE — 80061 LIPID PANEL: CPT

## 2020-12-29 PROCEDURE — 82533 TOTAL CORTISOL: CPT

## 2020-12-29 PROCEDURE — 84484 ASSAY OF TROPONIN QUANT: CPT

## 2020-12-29 PROCEDURE — 6370000000 HC RX 637 (ALT 250 FOR IP): Performed by: FAMILY MEDICINE

## 2020-12-29 PROCEDURE — 80053 COMPREHEN METABOLIC PANEL: CPT

## 2020-12-29 PROCEDURE — 83880 ASSAY OF NATRIURETIC PEPTIDE: CPT

## 2020-12-29 PROCEDURE — 82043 UR ALBUMIN QUANTITATIVE: CPT

## 2020-12-29 PROCEDURE — 6360000004 HC RX CONTRAST MEDICATION: Performed by: INTERNAL MEDICINE

## 2020-12-29 RX ORDER — ASPIRIN 81 MG/1
324 TABLET, CHEWABLE ORAL ONCE
Status: COMPLETED | OUTPATIENT
Start: 2020-12-29 | End: 2020-12-29

## 2020-12-29 RX ADMIN — IOPAMIDOL 85 ML: 755 INJECTION, SOLUTION INTRAVENOUS at 15:11

## 2020-12-29 RX ADMIN — ASPIRIN 324 MG: 81 TABLET, CHEWABLE ORAL at 17:10

## 2020-12-29 ASSESSMENT — PAIN DESCRIPTION - LOCATION: LOCATION: CHEST

## 2020-12-29 ASSESSMENT — PAIN DESCRIPTION - DESCRIPTORS: DESCRIPTORS: ACHING

## 2020-12-29 ASSESSMENT — PAIN DESCRIPTION - FREQUENCY: FREQUENCY: CONTINUOUS

## 2020-12-29 ASSESSMENT — PAIN DESCRIPTION - PAIN TYPE: TYPE: ACUTE PAIN

## 2020-12-29 ASSESSMENT — PAIN SCALES - GENERAL
PAINLEVEL_OUTOF10: 7
PAINLEVEL_OUTOF10: 5
PAINLEVEL_OUTOF10: 8

## 2020-12-29 ASSESSMENT — PAIN DESCRIPTION - ONSET: ONSET: ON-GOING

## 2020-12-29 ASSESSMENT — HEART SCORE: ECG: 1

## 2020-12-29 ASSESSMENT — PAIN DESCRIPTION - ORIENTATION: ORIENTATION: LEFT

## 2020-12-29 ASSESSMENT — PAIN DESCRIPTION - PROGRESSION: CLINICAL_PROGRESSION: NOT CHANGED

## 2020-12-29 NOTE — Clinical Note
Patient Class: Observation [104]   REQUIRED: Diagnosis: Chest pain [043191]   Estimated Length of Stay: Estimated stay of less than 2 midnights   Telemetry Bed Required?: Yes

## 2020-12-29 NOTE — TELEPHONE ENCOUNTER
Reviewed with Dr Jani Gee:  Please order Annia. Ordered and pt notified.      Please agree with verbal order

## 2020-12-29 NOTE — ED PROVIDER NOTES
[address] COMPLAINT   Chief Complaint   Patient presents with    Chest Pain        HPI   Nasra Geiger is a 48 y.o. male with an extended history of angina, undergo periodic testings including echocardiogram, stress test (lexiscan upcoming), has a [place] filter that was concerned for movement, so he underwent an outpatient CTA of the chest, after which he had a bout of extreme hypertension, so a rapid response is called to the CT suite, and patient was asked to come to the ED for evaluation. Pt states he's been experiencing extreme non-exertional substernal chest pain that radiates straight into his back multiple times a day for the past 1.5 weeks. Pt denies any syncope, diaphoresis or extremity paresthesia. He was sent here due to his ongoing chest pain, onset was 1.5 weeks ago. The duration has been intermittent. He denies any cough or fevers. REVIEW OF SYSTEMS   Cardiac: +Chest Pain, Denies syncope   Respiratory: Denies cough or hemoptysis   GI: Denies Vomiting or Diarrhea   General: Denies Fever   All other review of systems are reviewed and are otherwise negative.      PAST MEDICAL & SURGICAL HISTORY   Past Medical History:   Diagnosis Date    Abnormal thyroid biopsy     Acid reflux     Anemia     previously seen by Dr. Karsten Banuelos (due to enlarged spleen)    Bipolar disorder ([address]     Blood clot in vein 4/7/16    [facility]) 04/2019    thyroid    Chest pain     previously seeing [facility] cardiologist, now seeing Dr. Penelope Ortega (LAD bridging on cath 4/2016)    Chronic back pain     Chronic bronchitis ([address]     Dr. Claudia Zhang 10/2012 - no longer following with him    Colon polyp 08/30/2016    Depression     Dr. Elsa Leach DVT (deep venous thrombosis) ([address] [id number]    not on Coumadin due to unable to regulate - Dr. Bianca Burnette - no etiology found per patient    Esophageal abnormality     nodule     Fatty liver disease, nonalcoholic     U/S [id number]/9443 [facility]    Furuncle     legs    History of Doppler ultrasound 5-29-11    No hemodynamically significant carotid stenosis is identified. A thyroid nodule on each side. Dedicated ultrasound of thyroid gland is suggested to further evaluate if clinically indicated.      History of pulmonary embolism 2007    s/p GFF, related to knee surgery    Hx of blood clots     Hyperlipidemia     severely elevated triglycerides    Hypertension     diastolic    Intracranial arachnoid cyst 11/2012    Dr. Aniya Xie drained, complicated with seizures, DKA    Irritable bowel syndrome     Liver disease     Lamar Music - elevated LFT - positive smooth muscle antibody, steatosis per liver bx 3/2014 with Dr. Kelly Crawford (multiple drug resistant organisms) resistance 2012    MRSA (methicillin resistant staph aureus) culture positive     h/o in foot and before brain surgery    Nephrolithiasis     noted on CT abdomen 9/2016, 6/2019    Neuropathy     Pancreatic insufficiency     Positive SANDY (antinuclear antibody)     Dr. Salina Lundy - first visit in 6/2013    Restless legs syndrome     Seizures ([address]     Sinus tachycardia     Holter 3/2013 - seeing Dr. Christen Arreola fracture [facility])     clips     Sleep apnea     multiple MD's suggested testing but he refuses to be tested as claustraphobic and won't use Cpap    Sleep apnea 10/2020    Type II or unspecified type diabetes mellitus without mention of complication, not stated as uncontrolled 2007      Past Surgical History:   Procedure Laterality Date    CARDIAC CATHETERIZATION      2011,5-6 years ago   [address]  3-2012   [address]  04/28/2016    [facility]     CARPAL TUNNEL RELEASE  01/2017    CHOLECYSTECTOMY  2004    COLONOSCOPY  2012    COLONOSCOPY  08/2016    2 tubular adenomas - reportedly needs repeat scope in 6 months    CRANIOTOMY  11/2012    arachnoid cyst drainage    EKG 12-LEAD  10/18/2015         ENDOSCOPY, COLON, DIAGNOSTIC      HERNIA REPAIR Right 1996    [facility]--Dr. Catherine Joe    INGUINAL HERNIA REPAIR Right 09/15/2016    Robotic assisted    JOINT REPLACEMENT      KNEE ARTHROSCOPY Right 2016    KNEE SURGERY Left 2007    acl and debrided twice    OTHER SURGICAL HISTORY  5-31-11    Tilt table was associated w/ nonspecific symptoms or nausea, otherwise no significant dizziness or syncope. No significant hemodynamic changes. Otherwise, unremarkable tilt table test after 30 minutes of tilting.  OTHER SURGICAL HISTORY  01/20/2017    RIGHT SHOULDER ARTHROSCOPY, OPEN STAN, OPEN ACROMIOPLASTY, BICEP TENDESIS, RIGHT CARPAL TUNNEL RELEASE    SHOULDER SURGERY Right 01/2007    TONSILLECTOMY      TRANSTHORACIC ECHOCARDIOGRAM  3-05-11    LV size and systolic function normal. EF 55-65%.  UPPER GASTROINTESTINAL ENDOSCOPY  2012    UPPER GASTROINTESTINAL ENDOSCOPY  2016    Dr. Etta Hunt Left 10/22/2019    EGD DILATION SAVORY performed by Rosa Oneal MD at [address] [facility] ENDOSCOPY Left 10/22/2019    EGD BIOPSY performed by Rosa Oneal MD at [address]  2007        CURRENT MEDICATIONS   Current Outpatient Rx   Medication Sig Dispense Refill    gabapentin (NEURONTIN) 600 MG tablet Take 600 mg by mouth 4 times daily.  rOPINIRole (REQUIP) 1 MG tablet TAKE 1 TABLET BY MOUTH THREE TIMES A  tablet 1    albuterol sulfate HFA (VENTOLIN HFA) 108 (90 Base) MCG/ACT inhaler Inhale 2 puffs into the lungs every 4 hours as needed for Wheezing 1 Inhaler 0    vitamin C (ASCORBIC ACID) 500 MG tablet Take 1 tablet by mouth 2 times daily 28 tablet 0    Misc. Devices (PULSE OXIMETER FOR FINGER) MISC Use as needed for SOB. Seek emergency department care for saturation below 90%.  1 each 0    Cyanocobalamin (VITAMIN B-12) 2500 MCG SUBL Place 1 tablet under the tongue daily 90 tablet 1    gabapentin (NEURONTIN) 600 MG tablet Take 1 tablet by mouth 4 times daily for 90 days. 360 tablet 1    fenofibrate (TRICOR) 145 MG tablet Take 1 tablet by mouth daily 90 tablet 1    atorvastatin (LIPITOR) 10 MG tablet TAKE 1 TABLET BY MOUTH ONE TIME A DAY 90 tablet 1    vitamin D (ERGOCALCIFEROL) 1.25 MG (35707 UT) CAPS capsule Take 1 capsule by mouth once a week 12 capsule 1    metoprolol tartrate (LOPRESSOR) 50 MG tablet Take 1.5 tablets by mouth 2 times daily 270 tablet 1    blood glucose test strips (ASCENSIA AUTODISC VI;ONE TOUCH ULTRA TEST VI) strip Test blood sugars 3 times daily DX:E11.40 100 each 3    insulin glargine (LANTUS;BASAGLAR) 100 UNIT/ML injection pen 75 Units Qam, 55 Units Qpm (Patient taking differently: 75 Units Qam, 75 Units Qpm) 11 pen 3    insulin lispro, 1 Unit Dial, (HUMALOG KWIKPEN) 100 UNIT/ML SOPN Inject 30 Units TID before meals plus SSI. (Patient taking differently: Inject 30 Units TID before meals plus SS 2) 8 pen 3    blood glucose monitor kit and supplies Test 3 times a day dx:E11.40 1 kit 0    empagliflozin (JARDIANCE) 25 MG tablet Take 25 mg by mouth daily 42 tablet 0    promethazine (PHENERGAN) 12.5 MG tablet Take 1-2 tablets by mouth every 8 hours as needed for Nausea 60 tablet 2    pantoprazole (PROTONIX) 40 MG tablet Take 1 tablet by mouth 2 times daily 180 tablet 1    Melatonin 10 MG TABS Take 30 mg by mouth nightly       DULoxetine (CYMBALTA) 60 MG extended release capsule Take 120 mg by mouth daily       lacosamide (VIMPAT) 100 MG TABS tablet Take 300 mg by mouth 2 times daily.  sildenafil (VIAGRA) 100 MG tablet Take 1 tablet by mouth as needed for Erectile Dysfunction 45 tablet 3    divalproex (DEPAKOTE) 500 MG DR tablet Take 500 mg by mouth 2 times daily       Blood Glucose Monitoring Suppl (ONE TOUCH BASIC SYSTEM) W/DEVICE KIT Use to test blood glucose level 4 times per day.  Diagnosis: E11.40 1 kit 0    levETIRAcetam (KEPPRA) 500 MG tablet Take 2,000 mg by mouth 2 times daily       LORazepam (ATIVAN) 0.5 MG tablet Take 0.5 mg by mouth 2 times daily as needed (seizures)       zonisamide (ZONEGRAN) 100 MG capsule Take 500 mg by mouth nightly           ALLERGIES   Allergies   Allergen Reactions    Ciprofloxacin-Ciproflox Hcl Er Other (See Comments)     Elevated creatinine    Heparin      Monitor for thrombocytopenia    Metformin Nausea Only     Abdominal pain, diarrhea    Niacin And Related Other (See Comments)     Burning sensation, severe flushing    Tramadol Hcl      Contraindication to seizure medications    Ultram [Tramadol]      Contraindication to seizure medications    Warfarin      Very difficult to regulate in past        SOCIAL & FAMILY HISTORY   Social History     Socioeconomic History    Marital status:      Spouse name: None    Number of children: 3    Years of education: None    Highest education level: None   Occupational History    Occupation: Disability since 2011   Social Needs    Financial resource strain: None    Food insecurity     Worry: None     Inability: None    Transportation needs     Medical: None     Non-medical: None   Tobacco Use    Smoking status: Never Smoker    Smokeless tobacco: Never Used   Substance and Sexual Activity    Alcohol use: No     Alcohol/week: 0.0 standard drinks    Drug use: No    Sexual activity: None   Lifestyle    Physical activity     Days per week: None     Minutes per session: None    Stress: None   Relationships    Social connections     Talks on phone: None     Gets together: None     Attends [employer] service: None     Active member of club or organization: None     Attends meetings of clubs or organizations: None     Relationship status: None    Intimate partner violence     Fear of current or ex partner: None     Emotionally abused: None     Physically abused: None     Forced sexual activity: None   Other Topics Concern    None   Social History Narrative    None      Family History   Problem Relation Age of Onset    Heart Disease Father 61        MI    Stroke Brother     Obesity Brother     Arthritis Mother     Seizures Mother     Obesity Sister     Other Brother         pulmonary embolism    Diabetes Daughter     Seizures Brother         PHYSICAL EXAM   VITAL SIGNS: BP (!) 138/97   Pulse 87   Temp 98.4 °F (36.9 °C) (Oral)   Resp 17   Wt 230 lb (104.3 kg)   SpO2 98%   BMI 29.53 kg/m²    Constitutional: Well developed, well nourished, no acute distress   HENT: Atraumatic, moist mucus membranes   Neck: supple, no JVD   Respiratory: Lungs Clear, no retractions   Cardiovascular: non-reproducible anterior or substernal chest wall tenderness to palpation; normal rhythm, Reg rate, no murmurs   Vascular: Radial pulses 2+ equal bilaterally   GI: Soft, nontender, normal bowel sounds   Musculoskeletal: No edema, no deformities   Integument: Skin warm and dry, no petechiae   Neurologic: Alert & oriented, normal speech   Psych: Pleasant affect, no hallucinations     EKG (interpreted by me) 12/29/20 15:33  Rhythm: Sinus   Rate: 86 BPM   Axis: normal   Conduction: normal   ST/T Segments: nonacute     RADIOLOGY/PROCEDURES   No orders to display        LABS   Labs Reviewed   CBC WITH AUTO DIFFERENTIAL - Abnormal; Notable for the following components:       Result Value    MCHC 36.7 (*)     All other components within normal limits   COMPREHENSIVE METABOLIC PANEL W/ REFLEX TO MG FOR LOW K - Abnormal; Notable for the following components:    Glucose 422 (*)     Chloride 97 (*)     Total Bilirubin 1.3 (*)     All other components within normal limits   TROPONIN   BRAIN NATRIURETIC PEPTIDE   ANION GAP   GLOMERULAR FILTRATION RATE, ESTIMATED   OSMOLALITY        ED COURSE & MEDICAL DECISION MAKING   Pertinent Labs & Imaging studies reviewed and interpreted. (See chart for details)   See chart for details of medications given during the ED stay.    Vitals:    12/29/20 1757   BP: (!) 138/97   Pulse: 87   Resp: 17   Temp:    SpO2: 98%        Consultation: pending workup    The transition of care will be at 6:36 PM.     Differential Diagnosis: Acute Coronary Syndrome, Congestive Heart Failure, Myocardial Infarction, Pulmonary Embolus, Thoracic Dissection, Pneumonia, Pneumothorax, other. FINAL IMPRESSION   1. Chest pain, unspecified type    2. Essential hypertension         Plan:  MDM    Pt has ongoing chest pain that has not improved, so certainly will benefit from an inpatient chest pain evaluation. Pt will be given full dose aspirin as we evaluate his CTA findings today. It should be noted that his CTA was negative for dissection or PE. Initially pt was admitted to [facility], but pt changed his mind, and decided to be discharged with close outpt followup.   Electronically signed by: Magdaline Dandy MD, 12/29/2020 6:36 PM   (This note was completed with a voice recognition program)        Gene Garza MD  12/29/20 1068

## 2020-12-29 NOTE — ED NOTES
Pt ambulates steadily to the restroom at this time. Pt states that the hospitalist came down to talk to him and he told them that he did not want to be admitted.       Paige Lay RN  12/29/20 6013

## 2020-12-29 NOTE — ED NOTES
VS obtained. Pt resting comfortably on cot on phone. Pt states that pain is 8 out of 10 and has gotten worse with aspirin. Pt refuses GI cocktail and states that it does not work. Pt states that he looked up his results on MyChart and states that everything looks good to him. Pt states that he might as well go home if everything looks good. Pt informed that the doctor would like pt to be admitted for the increasing chest pain.       Elie Diaz RN  12/29/20 9845

## 2020-12-29 NOTE — PROGRESS NOTES
Hospitalist note      Went to see patient on 12/29/2020 and he stated that he did not want to be admitted. FAINA cocktail was ordered for the patient.     Electronically signed by Paul Dominguez MD on 12/29/2020 at 6:17 PM

## 2020-12-29 NOTE — ED NOTES
Patient is resting in bed with easy and unlabored respirations. Call light in reach. Side rails up x2. Patient denies further complaints or concerns. Will monitor.         Erica Chamorro RN  12/29/20 0157

## 2020-12-29 NOTE — ED NOTES
ED nurse-to-nurse bedside report    Chief Complaint   Patient presents with    Chest Pain      LOC: alert and orientated to name, place, date  Vital signs   Vitals:    12/29/20 1538 12/29/20 1633 12/29/20 1709   BP: (!) 147/104 (!) 134/90 (!) 139/93   Pulse: 87 84 83   Resp: 23 21 19   Temp: 98.4 °F (36.9 °C)     TempSrc: Oral     SpO2: 97% 98% 98%   Weight: 230 lb (104.3 kg)        Pain:    Pain Interventions: Chewable AsA  Pain Goal: No Pain  Oxygen: NA    Current needs required room air   Telemetry: Yes  LDAs:   Peripheral IV 12/29/20 Right Antecubital (Active)   Site Assessment Clean;Dry; Intact 12/29/20 1709   Line Status Normal saline locked 12/29/20 1709   Dressing Status Clean;Dry; Intact 12/29/20 1709   Dressing Intervention New 12/29/20 1542     Continuous Infusions:   Mobility: Independent  Viramontes Fall Risk Score: Fall Risk 7/16/2019   2 or more falls in past year? yes   Fall with injury in past year?  yes     Fall Interventions: Call light in reach, side rails up x2  Report given to: Irina Pandya RN  12/29/20 4723

## 2020-12-29 NOTE — ED NOTES
Patient to ED room 19 with complaint of chest pain. Patient states that he was here for CT scan today to evaluate a [place] filter but states that he was referred to the ED due to having chest pain. Patient states that he has had this chest pain for the past week. He states that his pain has been unchanged since it began. Patient is resting in bed with easy and unlabored respirations. Call light in reach. Side rails up x2. Patient denies further complaints or concerns. Will monitor.         Deepika Wilkerson RN  12/29/20 0680

## 2020-12-29 NOTE — ED NOTES
Dr. Hiram Calvin updated on pt's wishes to go home instead of being admitted.       Jackie Ford RN  12/29/20 8023

## 2020-12-30 ENCOUNTER — TELEPHONE (OUTPATIENT)
Dept: INTERNAL MEDICINE CLINIC | Age: 50
End: 2020-12-30

## 2020-12-30 NOTE — TELEPHONE ENCOUNTER
Notified patient of lab results and recommendations . Patient states that he was fasting prior to lab draw and he had been taking the fenofibrate everyday . Patient states that his BS have been running high and he willl call in some BS readings to the office,  he did not have available when I spoke with him. Instructed patient to start the Lisinopril 10 mg daily call if issues with low BP , systolic <258 and to get appointment with GI to follow-up on atypical chest pain . Patient has not been checking his BP but wife checked his pulse today and it was elevated at 135 . Instructed patient to have wife monitor his BP and pulse . Patient verbalized understanding . See refill encounter 12/30/20.

## 2020-12-30 NOTE — TELEPHONE ENCOUNTER
Patient called back with BP and it was 185/107 . Instructed patient to get the Lisinopril started and continue monitoring his BP . Patient has stress test scheduled for tomorrow .

## 2020-12-31 ENCOUNTER — HOSPITAL ENCOUNTER (OUTPATIENT)
Dept: NON INVASIVE DIAGNOSTICS | Age: 50
Discharge: HOME | End: 2020-12-31
Payer: MEDICARE

## 2020-12-31 DIAGNOSIS — R94.31 ABNORMAL EKG: ICD-10-CM

## 2020-12-31 DIAGNOSIS — Z01.810 PREOP CARDIOVASCULAR EXAM: ICD-10-CM

## 2020-12-31 LAB — MISC. #1 REFERENCE GROUP TEST: NORMAL

## 2020-12-31 PROCEDURE — 78452 HT MUSCLE IMAGE SPECT MULT: CPT

## 2020-12-31 PROCEDURE — 93017 CV STRESS TEST TRACING ONLY: CPT | Performed by: INTERNAL MEDICINE

## 2020-12-31 PROCEDURE — 6360000002 HC RX W HCPCS

## 2020-12-31 PROCEDURE — 3430000000 HC RX DIAGNOSTIC RADIOPHARMACEUTICAL: Performed by: INTERNAL MEDICINE

## 2020-12-31 PROCEDURE — A9500 TC99M SESTAMIBI: HCPCS | Performed by: INTERNAL MEDICINE

## 2020-12-31 RX ADMIN — Medication 9.5 MILLICURIE: at 07:18

## 2020-12-31 RX ADMIN — Medication 33.4 MILLICURIE: at 08:09

## 2021-01-03 RX ORDER — LISINOPRIL 10 MG/1
10 TABLET ORAL DAILY
Qty: 90 TABLET | Refills: 1 | Status: ON HOLD | OUTPATIENT
Start: 2021-01-03 | End: 2021-01-15 | Stop reason: ALTCHOICE

## 2021-01-06 ENCOUNTER — OFFICE VISIT (OUTPATIENT)
Dept: ENT CLINIC | Age: 51
End: 2021-01-06
Payer: MEDICARE

## 2021-01-06 VITALS
TEMPERATURE: 97.9 F | WEIGHT: 241.7 LBS | SYSTOLIC BLOOD PRESSURE: 128 MMHG | BODY MASS INDEX: 31.03 KG/M2 | HEART RATE: 88 BPM | DIASTOLIC BLOOD PRESSURE: 70 MMHG | RESPIRATION RATE: 16 BRPM

## 2021-01-06 DIAGNOSIS — G47.33 OBSTRUCTIVE SLEEP APNEA: ICD-10-CM

## 2021-01-06 DIAGNOSIS — D49.7 FOLLICULAR NEOPLASM OF THYROID: ICD-10-CM

## 2021-01-06 DIAGNOSIS — J34.89 NASAL OBSTRUCTION: Primary | ICD-10-CM

## 2021-01-06 PROCEDURE — G8417 CALC BMI ABV UP PARAM F/U: HCPCS | Performed by: OTOLARYNGOLOGY

## 2021-01-06 PROCEDURE — 1036F TOBACCO NON-USER: CPT | Performed by: OTOLARYNGOLOGY

## 2021-01-06 PROCEDURE — 3017F COLORECTAL CA SCREEN DOC REV: CPT | Performed by: OTOLARYNGOLOGY

## 2021-01-06 PROCEDURE — G8427 DOCREV CUR MEDS BY ELIG CLIN: HCPCS | Performed by: OTOLARYNGOLOGY

## 2021-01-06 PROCEDURE — G8484 FLU IMMUNIZE NO ADMIN: HCPCS | Performed by: OTOLARYNGOLOGY

## 2021-01-06 PROCEDURE — 99214 OFFICE O/P EST MOD 30 MIN: CPT | Performed by: OTOLARYNGOLOGY

## 2021-01-06 NOTE — PROGRESS NOTES
[facility]  Dept: [phone]  Dept Fax: [phone]  Loc: [phone]    Nithin Costa is a 48 y.o. male who was referred by No ref. provider found for:  Chief Complaint   Patient presents with    Pre-op Exam     pt here for pre op exam thyroid lobectomy        HPI:     Nithin Costa is a 48 y.o. male      History: Allergies   Allergen Reactions    Ciprofloxacin-Ciproflox Hcl Er Other (See Comments)     Elevated creatinine    Heparin      Monitor for thrombocytopenia    Metformin Nausea Only     Abdominal pain, diarrhea    Niacin And Related Other (See Comments)     Burning sensation, severe flushing    Tramadol Hcl      Contraindication to seizure medications    Ultram [Tramadol]      Contraindication to seizure medications    Warfarin      Very difficult to regulate in past     Current Outpatient Medications   Medication Sig Dispense Refill    lisinopril (PRINIVIL;ZESTRIL) 10 MG tablet Take 1 tablet by mouth daily 90 tablet 1    gabapentin (NEURONTIN) 600 MG tablet Take 600 mg by mouth 4 times daily.  rOPINIRole (REQUIP) 1 MG tablet TAKE 1 TABLET BY MOUTH THREE TIMES A  tablet 1    albuterol sulfate HFA (VENTOLIN HFA) 108 (90 Base) MCG/ACT inhaler Inhale 2 puffs into the lungs every 4 hours as needed for Wheezing 1 Inhaler 0    vitamin C (ASCORBIC ACID) 500 MG tablet Take 1 tablet by mouth 2 times daily 28 tablet 0    Misc. Devices (PULSE OXIMETER FOR FINGER) MISC Use as needed for SOB. Seek emergency department care for saturation below 90%.  1 each 0    Cyanocobalamin (VITAMIN B-12) 2500 MCG SUBL Place 1 tablet under the tongue daily 90 tablet 1    fenofibrate (TRICOR) 145 MG tablet Take 1 tablet by mouth daily 90 tablet 1    atorvastatin (LIPITOR) 10 MG tablet TAKE 1 TABLET BY MOUTH ONE TIME A DAY 90 tablet 1  vitamin D (ERGOCALCIFEROL) 1.25 MG (71540 UT) CAPS capsule Take 1 capsule by mouth once a week 12 capsule 1    metoprolol tartrate (LOPRESSOR) 50 MG tablet Take 1.5 tablets by mouth 2 times daily 270 tablet 1    blood glucose test strips (ASCENSIA AUTODISC VI;ONE TOUCH ULTRA TEST VI) strip Test blood sugars 3 times daily DX:E11.40 100 each 3    insulin glargine (LANTUS;BASAGLAR) 100 UNIT/ML injection pen 75 Units Qam, 55 Units Qpm (Patient taking differently: 75 Units Qam, 75 Units Qpm) 11 pen 3    insulin lispro, 1 Unit Dial, (HUMALOG KWIKPEN) 100 UNIT/ML SOPN Inject 30 Units TID before meals plus SSI. (Patient taking differently: Inject 30 Units TID before meals plus SS 2) 8 pen 3    blood glucose monitor kit and supplies Test 3 times a day dx:E11.40 1 kit 0    empagliflozin (JARDIANCE) 25 MG tablet Take 25 mg by mouth daily 42 tablet 0    promethazine (PHENERGAN) 12.5 MG tablet Take 1-2 tablets by mouth every 8 hours as needed for Nausea 60 tablet 2    pantoprazole (PROTONIX) 40 MG tablet Take 1 tablet by mouth 2 times daily 180 tablet 1    Melatonin 10 MG TABS Take 30 mg by mouth nightly       DULoxetine (CYMBALTA) 60 MG extended release capsule Take 120 mg by mouth daily       lacosamide (VIMPAT) 100 MG TABS tablet Take 300 mg by mouth 2 times daily.  sildenafil (VIAGRA) 100 MG tablet Take 1 tablet by mouth as needed for Erectile Dysfunction 45 tablet 3    divalproex (DEPAKOTE) 500 MG DR tablet Take 500 mg by mouth 2 times daily       Blood Glucose Monitoring Suppl (ONE TOUCH BASIC SYSTEM) W/DEVICE KIT Use to test blood glucose level 4 times per day.  Diagnosis: E11.40 1 kit 0    levETIRAcetam (KEPPRA) 500 MG tablet Take 2,000 mg by mouth 2 times daily       LORazepam (ATIVAN) 0.5 MG tablet Take 0.5 mg by mouth 2 times daily as needed (seizures)       zonisamide (ZONEGRAN) 100 MG capsule Take 500 mg by mouth nightly  gabapentin (NEURONTIN) 600 MG tablet Take 1 tablet by mouth 4 times daily for 90 days. 360 tablet 1     No current facility-administered medications for this visit. Past Medical History:   Diagnosis Date    Abnormal thyroid biopsy     Acid reflux     Anemia     previously seen by Dr. Sylwia Roth (due to enlarged spleen)    Bipolar disorder ([facility] [address]     Blood clot in vein 4/7/16    Alexsandra Hunterer     Cancer [facility]) 04/2019    thyroid    Chest pain     previously seeing [facility] cardiologist, now seeing Dr. Patrick Gandhi (LAD bridging on cath 4/2016)    Chronic back pain     Chronic bronchitis ([facility] [address]     Dr. Mago Galeano 10/2012 - no longer following with him    Colon polyp 08/30/2016    Depression     Dr. Alli Santana DVT (deep venous thrombosis) ([facility] [address] [id number]    not on Coumadin due to unable to regulate - Dr. Siva Godinez - no etiology found per patient    Esophageal abnormality     nodule     Fatty liver disease, nonalcoholic     [facility]    [facility]     legs    History of Doppler ultrasound 5-29-11    No hemodynamically significant carotid stenosis is identified. A thyroid nodule on each side. Dedicated ultrasound of thyroid gland is suggested to further evaluate if clinically indicated.      History of pulmonary embolism 2007    s/p GFF, related to knee surgery    Hx of blood clots     Hyperlipidemia     severely elevated triglycerides    Hypertension     diastolic    Intracranial arachnoid cyst 11/2012    Dr. Christopher Diaz drained, complicated with seizures, DKA    Irritable bowel syndrome     Liver disease     Jim Anrdade - elevated LFT - positive smooth muscle antibody, steatosis per liver bx 3/2014 with Dr. Deon Nash (multiple drug resistant organisms) resistance 2012    MRSA (methicillin resistant staph aureus) culture positive     h/o in foot and before brain surgery    Nephrolithiasis     noted on CT abdomen 9/2016, 6/2019    Neuropathy     Pancreatic insufficiency  Positive SANDY (antinuclear antibody)     Dr. Isaac Oscar - first visit in 6/2013    Restless legs syndrome     Seizures ([address]     Sinus tachycardia     Holter 3/2013 - seeing Dr. Sandra Brower fracture [facility])     clips     Sleep apnea     multiple MD's suggested testing but he refuses to be tested as claustraphobic and won't use Cpap    Sleep apnea 10/2020    Type II or unspecified type diabetes mellitus without mention of complication, not stated as uncontrolled 2007      Past Surgical History:   Procedure Laterality Date    CARDIAC CATHETERIZATION      2011,5-6 years ago   [address]  3-2012   [address]  04/28/2016    [facility]     CARPAL TUNNEL RELEASE  01/2017    CHOLECYSTECTOMY  2004    COLONOSCOPY  2012    COLONOSCOPY  08/2016    2 tubular adenomas - reportedly needs repeat scope in 6 months    CRANIOTOMY  11/2012    arachnoid cyst drainage    EKG 12-LEAD  10/18/2015         ENDOSCOPY, COLON, DIAGNOSTIC      HERNIA REPAIR Right 1996    [facility]--Dr. Mara Cortez INGUINAL HERNIA REPAIR Right 09/15/2016    Robotic assisted    JOINT REPLACEMENT      KNEE ARTHROSCOPY Right 2016    KNEE SURGERY Left 2007    acl and debrided twice    OTHER SURGICAL HISTORY  5-31-11    Tilt table was associated w/ nonspecific symptoms or nausea, otherwise no significant dizziness or syncope. No significant hemodynamic changes. Otherwise, unremarkable tilt table test after 30 minutes of tilting.  OTHER SURGICAL HISTORY  01/20/2017    RIGHT SHOULDER ARTHROSCOPY, OPEN STAN, OPEN ACROMIOPLASTY, BICEP TENDESIS, RIGHT CARPAL TUNNEL RELEASE    SHOULDER SURGERY Right 01/2007    TONSILLECTOMY      TRANSTHORACIC ECHOCARDIOGRAM  3-05-11    LV size and systolic function normal. EF 55-65%.      UPPER GASTROINTESTINAL ENDOSCOPY  2012    UPPER GASTROINTESTINAL ENDOSCOPY  2016    Dr. Sultana Ram Left 10/22/2019 EGD DILATION SAVORY performed by Jayde Crouch MD at [address] Left 10/22/2019    EGD BIOPSY performed by Jayde Crouch MD at [address] [facility]   [place]  2007     Family History   Problem Relation Age of Onset    Heart Disease Father 61        MI    Stroke Brother     Obesity Brother     Arthritis Mother     Seizures Mother     Obesity Sister     Other Brother         pulmonary embolism    Diabetes Daughter     Seizures Brother      Social History     Tobacco Use    Smoking status: Never Smoker    Smokeless tobacco: Never Used   Substance Use Topics    Alcohol use: No     Alcohol/week: 0.0 standard drinks        Subjective:      Review of Systems  Rest of review of systems are negative, except as noted in HPI. Objective:     /70 (Site: Left Upper Arm, Position: Sitting)   Pulse 88   Temp 97.9 °F (36.6 °C) (Infrared)   Resp 16   Wt 241 lb 11.2 oz (109.6 kg)   BMI 31.03 kg/m²     Physical Exam     Vitals reviewed. Cta Chest W Contrast    Result Date: 12/29/2020  Minimal subsegmental atelectasis left base. Examination is otherwise unremarkable. **This report has been created using voice recognition software. It may contain minor errors which are inherent in voice recognition technology. ** Final report electronically signed by Dr. Eulalia Trujillo on 12/29/2020 4:12 PM     Lab Results   Component Value Date     12/29/2020     11/15/2020     09/01/2020    K 4.0 12/29/2020    K 3.9 11/15/2020    K 4.3 09/01/2020    K 3.7 06/26/2020    CL 97 12/29/2020    CL 98 11/15/2020    CL 97 09/01/2020    CO2 28 12/29/2020    CO2 26 11/15/2020    CO2 24 09/01/2020    BUN 9 12/29/2020    BUN 8 11/15/2020    BUN 9 09/01/2020    CREATININE 0.5 12/29/2020    CREATININE 0.5 12/29/2020    CREATININE 0.4 11/15/2020    CALCIUM 10.0 12/29/2020    CALCIUM 9.6 11/15/2020    CALCIUM 9.4 11/05/2020    PROT 7.1 12/29/2020 PROT 7.4 11/15/2020    PROT 6.5 06/26/2020    LABALBU 4.6 12/29/2020    LABALBU 4.1 11/15/2020    LABALBU 4.3 06/26/2020    LABALBU 4.3 03/23/2012    LABALBU 3.7 01/06/2012    LABALBU 4.1 01/05/2012    BILITOT 1.3 12/29/2020    BILITOT 1.1 11/15/2020    BILITOT 2.0 06/26/2020    ALKPHOS 104 12/29/2020    ALKPHOS 82 11/15/2020    ALKPHOS 84 06/26/2020    AST 14 12/29/2020    AST 21 11/15/2020    AST 15 06/26/2020    ALT 24 12/29/2020    ALT 24 11/15/2020    ALT 21 06/26/2020       All of the past medical history, past surgical history, family history,social history, allergies and current medications were reviewed with the patient. Assessment & Plan   Diagnoses and all orders for this visit:     Diagnosis Orders   1. Nasal obstruction     2. Obstructive sleep apnea     3. Follicular neoplasm of thyroid         Based on the patient's history and physical findings, the patient has significant obstructive sleep apnea and cannot tolerate CPAP. He is therefore a candidate for a uvulopalatopharyngoplasty as the for surgical effort at enabling him to breathe more effectively and with less resistance. I reviewed with him the indications benefits limitations and risks of proceeding in this manner as well as the possibility that he would need adjunctive surgery is to follow this if it is insufficient to enable him to breathe without obstruction by itself. The risks I described include the potential for bleeding and infection as well as velopharyngeal insufficiency and nasal regurgitation of liquids, a problem, if it occurs, that tends to be self-limited. Continued obstructive sleep apnea is another risk and the need for additional surgery as described above. It is possible he will need the lingual tonsils removed or other forms of pharyngoplasty and he may need nasal surgery to improve his nasal breathing again if these other measures are insufficient. Regarding his thyroid disease, the patient recently had an ultrasound of the thyroid where he was described as having to low risk lesions of the thyroid. This was out of context to the 2 fine-needle aspirations which demonstrated follicular cells of indeterminate risk for malignancy. Those 2 fine-needle aspirations were done in 2019 and on the basis of the patient's seeking surgical treatment for that side of his thyroid to allow a definitive diagnosis of cancer or the exclusion of that diagnosis once and for all. He understands that if he is found to have a follicular cancer or some other malignancy of the thyroid, he may have to undergo a contralateral lobe removal sometime in the relatively near future after the first procedure. The risks of a unilateral lobectomy include bleeding, infection, unilateral vocal fold paralysis, and the potential that 1 or both parathyroid glands of that side may be dysfunctional following the procedure. This relates to the risks of the second side of the gland being removed which could result in both vocal folds having movement pathology and problems with calcium homeostasis requiring a prolonged if not permanent need for high-dose calcium supplementation among other agents such as vitamin D to help regulate his calcium. The patient reported understanding these and other risks both about his thyroid gland and about the UPPP procedure for sleep apnea and wished to proceed. He has scheduled for the near future. I spent over 30 minutes in face-to-face time with the patient reviewing his diagnoses and the treatment plan related to each. Return in about 4 weeks (around 2/3/2021) for 1 week postop for drain removal.           **This report has been created using voice recognition software. It may contain minor errors which are inherent in voice recognition technology. **

## 2021-01-07 ENCOUNTER — TELEPHONE (OUTPATIENT)
Dept: INTERNAL MEDICINE CLINIC | Age: 51
End: 2021-01-07

## 2021-01-07 RX ORDER — INSULIN GLARGINE 100 [IU]/ML
75 INJECTION, SOLUTION SUBCUTANEOUS 2 TIMES DAILY
Status: ON HOLD | COMMUNITY
End: 2021-08-04 | Stop reason: SDUPTHER

## 2021-01-07 RX ORDER — INSULIN LISPRO 100 [IU]/ML
30 INJECTION, SOLUTION INTRAVENOUS; SUBCUTANEOUS 3 TIMES DAILY
Status: ON HOLD | COMMUNITY
End: 2021-01-25 | Stop reason: SDUPTHER

## 2021-01-07 NOTE — PROGRESS NOTES
Following instructions given to patient, who states understanding:    NPO after midnight  [employer] and 's license  Wear comfortable clean clothing  Do not bring jewelry   Shower night before and morning of surgery with a liquid antibacterial soap  Bring medications in original bottles  Follow all instructions given by your physician   needed at discharge  Call [phone] for any questions  Report to [facility] on 2nd floor  If you would become ill prior to surgery, please call the surgeon  May have only 1 visitor accompany you for surgery  Please bring and wear mask  You will be receiving a phone call one or two days before surgery to review covid screening exposure

## 2021-01-07 NOTE — TELEPHONE ENCOUNTER
Dr Amin Sessions saw Dori Monterroso on 12/22/21 and referred him to ENT. He is being scheduled for surgery on 1/15/21 for a Thyroid Lobectomy and a Uvulopalatopharyngoplasty with Dr Lucie Molina. He has been cleared by Cardiology and Pulmonary. Please advise if can be cleared. Thank you!

## 2021-01-07 NOTE — TELEPHONE ENCOUNTER
Surgery scheduler from Dr. Dwayne Quezada called asking if you would clear patient for surgery on 1/15/20 .  stated that he has been cleared by cardiology and pulmonary . Please advise . Patient has stress test 12/31/20.

## 2021-01-12 ENCOUNTER — OFFICE VISIT (OUTPATIENT)
Dept: INTERNAL MEDICINE CLINIC | Age: 51
End: 2021-01-12
Payer: MEDICARE

## 2021-01-12 VITALS
WEIGHT: 243.4 LBS | BODY MASS INDEX: 31.24 KG/M2 | SYSTOLIC BLOOD PRESSURE: 130 MMHG | HEART RATE: 94 BPM | HEIGHT: 74 IN | DIASTOLIC BLOOD PRESSURE: 80 MMHG | TEMPERATURE: 97.2 F

## 2021-01-12 DIAGNOSIS — F32.A DEPRESSION, UNSPECIFIED DEPRESSION TYPE: ICD-10-CM

## 2021-01-12 DIAGNOSIS — R79.89 ELEVATED CORTISOL LEVEL: ICD-10-CM

## 2021-01-12 DIAGNOSIS — Z79.4 TYPE 2 DIABETES MELLITUS WITH DIABETIC NEUROPATHY, WITH LONG-TERM CURRENT USE OF INSULIN (HCC): ICD-10-CM

## 2021-01-12 DIAGNOSIS — G25.81 RESTLESS LEGS SYNDROME: ICD-10-CM

## 2021-01-12 DIAGNOSIS — Z91.14 NON COMPLIANCE W MEDICATION REGIMEN: ICD-10-CM

## 2021-01-12 DIAGNOSIS — R07.89 OTHER CHEST PAIN: ICD-10-CM

## 2021-01-12 DIAGNOSIS — E11.40 TYPE 2 DIABETES MELLITUS WITH DIABETIC NEUROPATHY, WITH LONG-TERM CURRENT USE OF INSULIN (HCC): ICD-10-CM

## 2021-01-12 DIAGNOSIS — E78.2 MIXED HYPERLIPIDEMIA: ICD-10-CM

## 2021-01-12 DIAGNOSIS — G47.33 OBSTRUCTIVE SLEEP APNEA: ICD-10-CM

## 2021-01-12 DIAGNOSIS — Z86.718 HISTORY OF DVT (DEEP VEIN THROMBOSIS): ICD-10-CM

## 2021-01-12 DIAGNOSIS — I10 ESSENTIAL HYPERTENSION: ICD-10-CM

## 2021-01-12 DIAGNOSIS — D49.7 FOLLICULAR NEOPLASM OF THYROID: ICD-10-CM

## 2021-01-12 DIAGNOSIS — G40.209 PARTIAL SYMPTOMATIC EPILEPSY WITH COMPLEX PARTIAL SEIZURES, NOT INTRACTABLE, WITHOUT STATUS EPILEPTICUS (HCC): ICD-10-CM

## 2021-01-12 DIAGNOSIS — E66.9 OBESITY (BMI 30-39.9): Chronic | ICD-10-CM

## 2021-01-12 DIAGNOSIS — Z01.818 PREOP EXAM FOR INTERNAL MEDICINE: Primary | ICD-10-CM

## 2021-01-12 PROCEDURE — 99214 OFFICE O/P EST MOD 30 MIN: CPT | Performed by: INTERNAL MEDICINE

## 2021-01-12 PROCEDURE — G8417 CALC BMI ABV UP PARAM F/U: HCPCS | Performed by: INTERNAL MEDICINE

## 2021-01-12 PROCEDURE — G8484 FLU IMMUNIZE NO ADMIN: HCPCS | Performed by: INTERNAL MEDICINE

## 2021-01-12 PROCEDURE — G8427 DOCREV CUR MEDS BY ELIG CLIN: HCPCS | Performed by: INTERNAL MEDICINE

## 2021-01-12 PROCEDURE — 2022F DILAT RTA XM EVC RTNOPTHY: CPT | Performed by: INTERNAL MEDICINE

## 2021-01-12 NOTE — PROGRESS NOTES
Aníbal Bronson (:  1970) is a 48 y.o. male,Established patient, here for evaluation of the following chief complaint(s):  Pre-op Exam (Dr. Mira Chicas / 1/15/21 / thyroid lobectomy and UPPP / [facility] ), Chest Pain (review stress test and CTA chest results), and Other (review cortisol results)      ASSESSMENT/PLAN:    Patient is an acceptable surgical candidate for planned procedure. Cardiac risk assessment/chest pain:  Patient has intermediate clinical predictors of cardiac risk and tolerates greater than 4 mets of activity. Patient is scheduled for an elective intermediate risk surgery and is considered at an acceptable cardiac risk based on ACC/AHA criteria. He had LHC in 2016 without significant disease. Echo in 2019 showed normal EF. Stress test in 2018 showed no ischemia. He had an episode of chest pain 2020, different from his typical chest pain - radiated to back. Stress test 2020 without ischemia, normal EF. CTA chest without dissection/aneurysm/central PE. Dr. Jackson Hidden clearance note states he is at moderate cardiac risk. 1. Preop exam for internal medicine  2. Obstructive sleep apnea  3. Follicular neoplasm of thyroid  4. Other chest pain  5. Essential hypertension  6. Type 2 diabetes mellitus with diabetic neuropathy, with long-term current use of insulin ([facility])  7. Elevated cortisol level  -     External Referral To Endocrinology  8. Partial symptomatic epilepsy with complex partial seizures, not intractable, without status epilepticus ([address]  9. History of DVT (deep vein thrombosis)  10. Obesity (BMI 30-39.9)  11. Depression, unspecified depression type  12. Restless legs syndrome  13. Mixed hyperlipidemia  14. Non compliance w medication regimen    Reviewed medications - he has been given instructions per surgery and agree with decreasing insulin by 1/2 the night prior and hold am of surgery. Hypertension - BP controlled, I do think he could tolerate the lisinopril due to elevated microalbumin, but with upcoming surgery, will hold off for now. DM - improved control the last 2 weeks as eating a DM diet - really encouraged him to continue this post-op to have adequate healing after surgery. Encouraged him to call me with any hypoglycemia and will adjust insulin as may not be eating as much after UPPP portion of surgery. Elevated cortisol levels - levels could be elevated due to stress, and has contamination of salivary samples with blood. Will refer to Endocrinology for further evaluation. Seizure disorder - recommend seizure precautions during time in hospital.  He is on chronic medication per Dr. Papa Rolle in [place]. Avoid medications which may lower seizure threshold. He is aware to continue seizure medication up to and including the am of surgery. Obesity - mild - BMI 31.25, recommended staying on his DM diet and exercise as tolerates. Bipolar/Depression - this has been a significant issue in the past along with social/family stress - stress is worse right now with him being the  provider for 5 grandchildren, issues with 2 daughters currently. He continues to see Psych and no SI/HI currently. He is to continue medication up to and including am of surgery. RLS/neuropathy - chronic pain - on Requip and Neurontin. Mixed hyperlipidemia - concerned about his elevated triglycerides/educated on the significance of this degree of hypertriglyceridemia - counseled on continued DM diet, need to control DM better. On fenofibrate already. Keep previously made appt 3/2/2021 - but call if any issues with DM control post-op or issues post surgery.       SUBJECTIVE/OBJECTIVE:  HPI Pt presents for a preoperative medical consultation at the request of Dr. Marsi Farmer prior to thyroid lobectomy, and uvulopalatopharyngoplasty. Pt complains of sleep apnea and unable to tolerate CPAP - this surgery is another option for treatment of his sleep apnea. He has had 2 abnormal but non-diagnostic bx of thyroid with follicular cells but not enough to call officially positive. Plan is thyroid lobectomy and if positive for cancer may need additional surgery. Patient had cardiac and pulmonary clearance for this surgery, but then contracted COVID 11/2020 and surgery was postponed. Since then his pulmonary status has gotten back to baseline but developed chest pain. He had an episode of chest pain x 36 hours, called office and did not go to ER as instructed. When I saw him at last visit he no longer had chest pain - described previous episode as chest pain radiating to his back and was concerned about aortic dissection/aneurysm/PE and ordered CTA of chest.  Also did EKG with mild worsening of Q waves inferiorly, notified Dr. Sonia Morales who ordered stress test.  Reviewed CTA chest today with patient - negative for dissection/aneurysm/central PE, positive minimal atelectasis left base of lung. After CT scan, patient had lightheadedness and dizziness with significant hypertension. He was sent to ER - BP in /97. They were going to keep him for observation but as he had a stress test scheduled as outpatient and negative troponin, patient preferred to go home. Stress test negative for ischemia. Note from Dr. Sonia Morales - moderate cardiac risk for surgery. DM - HgA1c 9.2 - but in last couple weeks he has cut back carbs and FSBS have dramatically improved (one low at 39 - when he realized he needed to back off on short acting insulin due to less carbs), otherwise . Now on 75 Units of Lantus BID and Humalog adjusted by counting carbs 1 Unit per 7 gm carbs, plus SSI. Microalbumin 59 - started lisinopril - but he never started it. Triglycerides - 860 - on fenofibrate - now taking DM more seriously and getting diet under control - repeat levels in 3 months. Possible Cushing's disease - 12/29/20 elevated levels in saliva 0.4 at 1 am (should be less than 0.112 at 2300) but also contaminated with blood. Previous testing 0.638 12/2018 contaminated with blood. Urinary cortisol free 65.1 3/2019, 73.7 (<60) 2/2020. Will refer to Endo to continue to work this up. This work up was initiated due to uncontrolled DM but as patient has demonstrated today - if he sticks to a DM diet he can control sugars. Patient is non-compliant in getting these tests done in a timely manner that is why this has been ongoing for so long. Reactions to anesthesia: he had a seizure in post-op and put him into a medically induced coma after brain surgery. History of excessive bleeding: none    History of blood clots: yes - multiple DVT's in arms and legs and one PE (none since 2005) - very difficult to anticoagulate and not on chronic anticoagulation. S/p GFF.     History of blood transfusions:  FFP with difficulty with anticoagulation (high INR)    Reactions to blood transfusion: none     Past Medical History:   Diagnosis Date    Abnormal thyroid biopsy     Acid reflux     Anemia     resolved - previously seen by Dr. Jie Fan (due to enlarged spleen)    Anesthesia     seizures with brain surgery due to blood sugar got really high    [facility] disorder [facility]     Blood clot in vein 04/07/2016    [facility]) 04/2019    thyroid    Chest pain previously seeing [facility] cardiologist, now seeing Dr. Alondra Moreno (LAD bridging on cath 4/2016)    Chronic back pain     Chronic bronchitis ([facility]     Dr. David Henley Colon polyp 08/30/2016    Depression     seeing Romero Clipper DVT (deep venous thrombosis) ([facility] [id number]    not on Coumadin due to unable to regulate - Dr. Camila Stevens - no etiology found per patient    Esophageal abnormality     nodule     Fatty liver disease, nonalcoholic     U/S 42/0197 [facility]    Furuncle     legs    History of Doppler ultrasound 05/29/2011    No hemodynamically significant carotid stenosis is identified. A thyroid nodule on each side. Dedicated ultrasound of thyroid gland is suggested to further evaluate if clinically indicated.  History of pulmonary embolism 2007    s/p GFF, related to knee surgery    Hx of blood clots     leg and lung    Hyperlipidemia     severely elevated triglycerides    Hypertension     diastolic    Intracranial arachnoid cyst 11/2012    Dr. Forrester Revere drained, complicated with seizures, DKA    Irritable bowel syndrome     Liver disease     Milton James - elevated LFT - positive smooth muscle antibody, steatosis per liver bx 3/2014 with Dr. Arce Ricky (multiple drug resistant organisms) resistance 2012    MRSA (methicillin resistant staph aureus) culture positive     h/o in foot and before brain surgery    Nephrolithiasis     noted on CT abdomen 9/2016, 6/2019    Neuropathy     Pancreatic insufficiency     Positive SANDY (antinuclear antibody)     Dr. Lynita Boxer - first visit in 6/2013    Prolonged emergence from general anesthesia     Restless legs syndrome     Seizures ([facility]     started with brain surgery    Sinus tachycardia     Holter 3/2013 - seeing Dr. Yuli Hurt fracture [facility])     clips     Sleep apnea     positive sleep apnea per study 10/2020.     Systolic dysfunction     \"systolic bridge\"  EF 56% ECHO 9/2019  insulin lispro, 1 Unit Dial, (HUMALOG KWIKPEN) 100 UNIT/ML SOPN Inject 30 Units into the skin 3 times daily Plus sliding scale 2      lisinopril (PRINIVIL;ZESTRIL) 10 MG tablet Take 1 tablet by mouth daily 90 tablet 1    gabapentin (NEURONTIN) 600 MG tablet Take 600 mg by mouth 4 times daily.  rOPINIRole (REQUIP) 1 MG tablet TAKE 1 TABLET BY MOUTH THREE TIMES A  tablet 1    Cyanocobalamin (VITAMIN B-12) 2500 MCG SUBL Place 1 tablet under the tongue daily 90 tablet 1    fenofibrate (TRICOR) 145 MG tablet Take 1 tablet by mouth daily 90 tablet 1    atorvastatin (LIPITOR) 10 MG tablet TAKE 1 TABLET BY MOUTH ONE TIME A DAY 90 tablet 1    vitamin D (ERGOCALCIFEROL) 1.25 MG (45169 UT) CAPS capsule Take 1 capsule by mouth once a week 12 capsule 1    metoprolol tartrate (LOPRESSOR) 50 MG tablet Take 1.5 tablets by mouth 2 times daily 270 tablet 1    empagliflozin (JARDIANCE) 25 MG tablet Take 25 mg by mouth daily 42 tablet 0    promethazine (PHENERGAN) 12.5 MG tablet Take 1-2 tablets by mouth every 8 hours as needed for Nausea 60 tablet 2    pantoprazole (PROTONIX) 40 MG tablet Take 1 tablet by mouth 2 times daily 180 tablet 1    Melatonin 10 MG TABS Take by mouth nightly 3 tab      DULoxetine (CYMBALTA) 60 MG extended release capsule Take by mouth daily 2 tab      lacosamide (VIMPAT) 100 MG TABS tablet Take by mouth 2 times daily. 3 tab      sildenafil (VIAGRA) 100 MG tablet Take 1 tablet by mouth as needed for Erectile Dysfunction 45 tablet 3    divalproex (DEPAKOTE) 500 MG DR tablet Take 500 mg by mouth 2 times daily       levETIRAcetam (KEPPRA) 500 MG tablet Take by mouth 2 times daily 4 tab      LORazepam (ATIVAN) 0.5 MG tablet Take 0.5 mg by mouth 2 times daily as needed (seizures)       zonisamide (ZONEGRAN) 100 MG capsule Take by mouth nightly 4 tab       No current facility-administered medications on file prior to visit.       Allergies   Allergen Reactions  Ciprofloxacin-Ciproflox Hcl Er Other (See Comments)     Elevated creatinine    Heparin      Monitor for thrombocytopenia    Metformin Nausea Only     Abdominal pain, diarrhea    Niacin And Related Other (See Comments)     Burning sensation, severe flushing    Tramadol Hcl      Contraindication to seizure medications    Ultram [Tramadol]      Contraindication to seizure medications    Warfarin      Very difficult to regulate in past     Social History     Socioeconomic History    Marital status:      Spouse name: None    Number of children: 3    Years of education: None    Highest education level: None   Occupational History    Occupation: Disability since 2011   Social Needs    Financial resource strain: None    Food insecurity     Worry: None     Inability: None    Transportation needs     Medical: None     Non-medical: None   Tobacco Use    Smoking status: Never Smoker    Smokeless tobacco: Never Used   Substance and Sexual Activity    Alcohol use: No     Alcohol/week: 0.0 standard drinks    Drug use: No    Sexual activity: None   Lifestyle    Physical activity     Days per week: None     Minutes per session: None    Stress: None   Relationships    Social connections     Talks on phone: None     Gets together: None     Attends Islam service: None     Active member of club or organization: None     Attends meetings of clubs or organizations: None     Relationship status: None    Intimate partner violence     Fear of current or ex partner: None     Emotionally abused: None     Physically abused: None     Forced sexual activity: None   Other Topics Concern    None   Social History Narrative    None     Family History   Problem Relation Age of Onset    Heart Disease Father 61        MI    Stroke Brother     Obesity Brother     Arthritis Mother     Seizures Mother     Obesity Sister     Other Brother         pulmonary embolism    Diabetes Daughter     Seizures Brother Extremities - peripheral pulses normal, no pedal edema, no clubbing or cyanosis  Skin - warm and dry    Diagnostic data:   I have reviewed recent diagnostic testing including labs 12/29/2020, stress test and CTA chest.  I reviewed the ED report. An electronic signature was used to authenticate this note.     --Ankita Franklin MD

## 2021-01-14 ENCOUNTER — ANESTHESIA EVENT (OUTPATIENT)
Dept: OPERATING ROOM | Age: 51
DRG: 625 | End: 2021-01-14
Payer: MEDICARE

## 2021-01-14 NOTE — PROGRESS NOTES
Patient contacted regarding COVID-19 screen. Test was done on   Following questions asked: In the last month, have you been in contact with someone who was confirmed or suspected to have Coronavirus/COVID-19:  Patient stated NO      Pt was informed can be a visitor allowed. Please bring masks. Do you or family members have any of the following symptoms:  Cough-no   Muscle pain-no   Shortness of breath-no   Fever-no   Weakness-no  Severe headache-no   Sore throat-no   Respiratory symptoms-no    Have you traveled internationally in the last month?  No     Have you been to the emergency room recently-2 weeks ago for high bp

## 2021-01-15 ENCOUNTER — HOSPITAL ENCOUNTER (INPATIENT)
Age: 51
LOS: 9 days | Discharge: HOME | DRG: 625 | End: 2021-01-25
Attending: OTOLARYNGOLOGY | Admitting: OTOLARYNGOLOGY
Payer: MEDICARE

## 2021-01-15 ENCOUNTER — ANESTHESIA (OUTPATIENT)
Dept: OPERATING ROOM | Age: 51
DRG: 625 | End: 2021-01-15
Payer: MEDICARE

## 2021-01-15 VITALS — DIASTOLIC BLOOD PRESSURE: 90 MMHG | TEMPERATURE: 100 F | OXYGEN SATURATION: 98 % | SYSTOLIC BLOOD PRESSURE: 135 MMHG

## 2021-01-15 DIAGNOSIS — G89.18 ACUTE POST-OPERATIVE PAIN: Primary | ICD-10-CM

## 2021-01-15 LAB
ALBUMIN SERPL-MCNC: 3.9 G/DL (ref 3.5–5.1)
ALP BLD-CCNC: 54 U/L (ref 38–126)
ALT SERPL-CCNC: 28 U/L (ref 11–66)
ANION GAP SERPL CALCULATED.3IONS-SCNC: 11 MEQ/L (ref 8–16)
AST SERPL-CCNC: 25 U/L (ref 5–40)
BILIRUB SERPL-MCNC: 0.8 MG/DL (ref 0.3–1.2)
BUN BLDV-MCNC: 11 MG/DL (ref 7–22)
CALCIUM SERPL-MCNC: 8.2 MG/DL (ref 8.5–10.5)
CHLORIDE BLD-SCNC: 106 MEQ/L (ref 98–111)
CO2: 25 MEQ/L (ref 23–33)
CREAT SERPL-MCNC: 0.7 MG/DL (ref 0.4–1.2)
ERYTHROCYTE [DISTWIDTH] IN BLOOD BY AUTOMATED COUNT: 13.6 % (ref 11.5–14.5)
ERYTHROCYTE [DISTWIDTH] IN BLOOD BY AUTOMATED COUNT: 44.3 FL (ref 35–45)
GFR SERPL CREATININE-BSD FRML MDRD: > 90 ML/MIN/1.73M2
GLUCOSE BLD-MCNC: 131 MG/DL (ref 70–108)
GLUCOSE BLD-MCNC: 132 MG/DL (ref 70–108)
GLUCOSE BLD-MCNC: 140 MG/DL (ref 70–108)
GLUCOSE BLD-MCNC: 152 MG/DL (ref 70–108)
GLUCOSE BLD-MCNC: 154 MG/DL (ref 70–108)
GLUCOSE BLD-MCNC: 190 MG/DL (ref 70–108)
GLUCOSE BLD-MCNC: 217 MG/DL (ref 70–108)
GLUCOSE BLD-MCNC: 224 MG/DL (ref 70–108)
GLUCOSE BLD-MCNC: 225 MG/DL (ref 70–108)
HCT VFR BLD CALC: 41.1 % (ref 42–52)
HEMOGLOBIN: 14.4 GM/DL (ref 14–18)
MCH RBC QN AUTO: 31.6 PG (ref 26–33)
MCHC RBC AUTO-ENTMCNC: 35 GM/DL (ref 32.2–35.5)
MCV RBC AUTO: 90.1 FL (ref 80–94)
MRSA SCREEN RT-PCR: NEGATIVE
PLATELET # BLD: 157 THOU/MM3 (ref 130–400)
PMV BLD AUTO: 9.9 FL (ref 9.4–12.4)
POTASSIUM REFLEX MAGNESIUM: 4 MEQ/L (ref 3.5–5.2)
RBC # BLD: 4.56 MILL/MM3 (ref 4.7–6.1)
SODIUM BLD-SCNC: 142 MEQ/L (ref 135–145)
TOTAL PROTEIN: 6.3 G/DL (ref 6.1–8)
VANCOMYCIN RESISTANT ENTEROCOCCUS: NEGATIVE
WBC # BLD: 7.6 THOU/MM3 (ref 4.8–10.8)

## 2021-01-15 PROCEDURE — 7100000000 HC PACU RECOVERY - FIRST 15 MIN: Performed by: OTOLARYNGOLOGY

## 2021-01-15 PROCEDURE — 2580000003 HC RX 258: Performed by: PHYSICIAN ASSISTANT

## 2021-01-15 PROCEDURE — 0CBN0ZZ EXCISION OF UVULA, OPEN APPROACH: ICD-10-PCS | Performed by: OTOLARYNGOLOGY

## 2021-01-15 PROCEDURE — 6360000002 HC RX W HCPCS: Performed by: OTOLARYNGOLOGY

## 2021-01-15 PROCEDURE — 87500 VANOMYCIN DNA AMP PROBE: CPT

## 2021-01-15 PROCEDURE — 6370000000 HC RX 637 (ALT 250 FOR IP): Performed by: PHYSICIAN ASSISTANT

## 2021-01-15 PROCEDURE — 3600000014 HC SURGERY LEVEL 4 ADDTL 15MIN: Performed by: OTOLARYNGOLOGY

## 2021-01-15 PROCEDURE — 0CTPXZZ RESECTION OF TONSILS, EXTERNAL APPROACH: ICD-10-PCS | Performed by: OTOLARYNGOLOGY

## 2021-01-15 PROCEDURE — 82948 REAGENT STRIP/BLOOD GLUCOSE: CPT

## 2021-01-15 PROCEDURE — 6360000002 HC RX W HCPCS: Performed by: ANESTHESIOLOGY

## 2021-01-15 PROCEDURE — 2580000003 HC RX 258

## 2021-01-15 PROCEDURE — 2500000003 HC RX 250 WO HCPCS: Performed by: NURSE PRACTITIONER

## 2021-01-15 PROCEDURE — 0GTG0ZZ RESECTION OF LEFT THYROID GLAND LOBE, OPEN APPROACH: ICD-10-PCS | Performed by: OTOLARYNGOLOGY

## 2021-01-15 PROCEDURE — 2709999900 HC NON-CHARGEABLE SUPPLY: Performed by: OTOLARYNGOLOGY

## 2021-01-15 PROCEDURE — 85027 COMPLETE CBC AUTOMATED: CPT

## 2021-01-15 PROCEDURE — 88307 TISSUE EXAM BY PATHOLOGIST: CPT

## 2021-01-15 PROCEDURE — 6370000000 HC RX 637 (ALT 250 FOR IP): Performed by: OTOLARYNGOLOGY

## 2021-01-15 PROCEDURE — 42145 REPAIR PALATE PHARYNX/UVULA: CPT | Performed by: OTOLARYNGOLOGY

## 2021-01-15 PROCEDURE — 60220 PARTIAL REMOVAL OF THYROID: CPT | Performed by: OTOLARYNGOLOGY

## 2021-01-15 PROCEDURE — 36415 COLL VENOUS BLD VENIPUNCTURE: CPT

## 2021-01-15 PROCEDURE — 3600000004 HC SURGERY LEVEL 4 BASE: Performed by: OTOLARYNGOLOGY

## 2021-01-15 PROCEDURE — 80053 COMPREHEN METABOLIC PANEL: CPT

## 2021-01-15 PROCEDURE — 7100000001 HC PACU RECOVERY - ADDTL 15 MIN: Performed by: OTOLARYNGOLOGY

## 2021-01-15 PROCEDURE — 87081 CULTURE SCREEN ONLY: CPT

## 2021-01-15 PROCEDURE — 99223 1ST HOSP IP/OBS HIGH 75: CPT | Performed by: NURSE PRACTITIONER

## 2021-01-15 PROCEDURE — 87641 MR-STAPH DNA AMP PROBE: CPT

## 2021-01-15 PROCEDURE — 6360000002 HC RX W HCPCS

## 2021-01-15 PROCEDURE — 3700000001 HC ADD 15 MINUTES (ANESTHESIA): Performed by: OTOLARYNGOLOGY

## 2021-01-15 PROCEDURE — 2500000003 HC RX 250 WO HCPCS

## 2021-01-15 PROCEDURE — 2720000010 HC SURG SUPPLY STERILE: Performed by: OTOLARYNGOLOGY

## 2021-01-15 PROCEDURE — 2580000003 HC RX 258: Performed by: OTOLARYNGOLOGY

## 2021-01-15 PROCEDURE — 3700000000 HC ANESTHESIA ATTENDED CARE: Performed by: OTOLARYNGOLOGY

## 2021-01-15 PROCEDURE — 6360000002 HC RX W HCPCS: Performed by: PHYSICIAN ASSISTANT

## 2021-01-15 PROCEDURE — 88304 TISSUE EXAM BY PATHOLOGIST: CPT

## 2021-01-15 RX ORDER — LEVETIRACETAM 500 MG/1
2000 TABLET ORAL 2 TIMES DAILY
Status: CANCELLED | OUTPATIENT
Start: 2021-01-15

## 2021-01-15 RX ORDER — DULOXETIN HYDROCHLORIDE 60 MG/1
120 CAPSULE, DELAYED RELEASE ORAL DAILY
Status: CANCELLED | OUTPATIENT
Start: 2021-01-15

## 2021-01-15 RX ORDER — LIDOCAINE HYDROCHLORIDE 20 MG/ML
10 SOLUTION OROPHARYNGEAL
Status: DISCONTINUED | OUTPATIENT
Start: 2021-01-15 | End: 2021-01-16

## 2021-01-15 RX ORDER — INSULIN LISPRO 100 [IU]/ML
30 INJECTION, SOLUTION INTRAVENOUS; SUBCUTANEOUS 3 TIMES DAILY
Status: CANCELLED | OUTPATIENT
Start: 2021-01-15

## 2021-01-15 RX ORDER — LACOSAMIDE 100 MG/1
300 TABLET ORAL 2 TIMES DAILY
Status: DISCONTINUED | OUTPATIENT
Start: 2021-01-15 | End: 2021-01-19

## 2021-01-15 RX ORDER — FENOFIBRATE 160 MG/1
160 TABLET ORAL DAILY
Refills: 1 | Status: DISCONTINUED | OUTPATIENT
Start: 2021-01-15 | End: 2021-01-25 | Stop reason: HOSPADM

## 2021-01-15 RX ORDER — OXYCODONE HYDROCHLORIDE AND ACETAMINOPHEN 5; 325 MG/1; MG/1
2 TABLET ORAL EVERY 6 HOURS PRN
Status: DISCONTINUED | OUTPATIENT
Start: 2021-01-15 | End: 2021-01-15

## 2021-01-15 RX ORDER — ROPINIROLE 1 MG/1
1 TABLET, FILM COATED ORAL 3 TIMES DAILY
Status: DISCONTINUED | OUTPATIENT
Start: 2021-01-15 | End: 2021-01-25 | Stop reason: HOSPADM

## 2021-01-15 RX ORDER — PROMETHAZINE HYDROCHLORIDE 12.5 MG/1
12.5 TABLET ORAL EVERY 8 HOURS PRN
Status: DISCONTINUED | OUTPATIENT
Start: 2021-01-15 | End: 2021-01-25 | Stop reason: HOSPADM

## 2021-01-15 RX ORDER — CEFAZOLIN SODIUM 1 G/50ML
1 INJECTION, SOLUTION INTRAVENOUS EVERY 8 HOURS
Status: COMPLETED | OUTPATIENT
Start: 2021-01-15 | End: 2021-01-16

## 2021-01-15 RX ORDER — ROCURONIUM BROMIDE 10 MG/ML
INJECTION, SOLUTION INTRAVENOUS PRN
Status: DISCONTINUED | OUTPATIENT
Start: 2021-01-15 | End: 2021-01-15 | Stop reason: SDUPTHER

## 2021-01-15 RX ORDER — FENTANYL CITRATE 50 UG/ML
25 INJECTION, SOLUTION INTRAMUSCULAR; INTRAVENOUS EVERY 5 MIN PRN
Status: DISCONTINUED | OUTPATIENT
Start: 2021-01-15 | End: 2021-01-15 | Stop reason: HOSPADM

## 2021-01-15 RX ORDER — ATORVASTATIN CALCIUM 10 MG/1
10 TABLET, FILM COATED ORAL DAILY
Status: CANCELLED | OUTPATIENT
Start: 2021-01-15

## 2021-01-15 RX ORDER — MIDAZOLAM HYDROCHLORIDE 1 MG/ML
INJECTION INTRAMUSCULAR; INTRAVENOUS PRN
Status: DISCONTINUED | OUTPATIENT
Start: 2021-01-15 | End: 2021-01-15 | Stop reason: SDUPTHER

## 2021-01-15 RX ORDER — PANTOPRAZOLE SODIUM 40 MG/1
40 TABLET, DELAYED RELEASE ORAL 2 TIMES DAILY
Status: CANCELLED | OUTPATIENT
Start: 2021-01-15

## 2021-01-15 RX ORDER — ERGOCALCIFEROL 1.25 MG/1
50000 CAPSULE ORAL WEEKLY
Status: CANCELLED | OUTPATIENT
Start: 2021-01-15

## 2021-01-15 RX ORDER — SODIUM CHLORIDE 9 MG/ML
INJECTION, SOLUTION INTRAVENOUS CONTINUOUS
Status: CANCELLED | OUTPATIENT
Start: 2021-01-15

## 2021-01-15 RX ORDER — ONDANSETRON 2 MG/ML
INJECTION INTRAMUSCULAR; INTRAVENOUS PRN
Status: DISCONTINUED | OUTPATIENT
Start: 2021-01-15 | End: 2021-01-15 | Stop reason: SDUPTHER

## 2021-01-15 RX ORDER — NICOTINE POLACRILEX 4 MG
15 LOZENGE BUCCAL PRN
Status: CANCELLED | OUTPATIENT
Start: 2021-01-15

## 2021-01-15 RX ORDER — SODIUM CHLORIDE 0.9 % (FLUSH) 0.9 %
10 SYRINGE (ML) INJECTION PRN
Status: CANCELLED | OUTPATIENT
Start: 2021-01-15

## 2021-01-15 RX ORDER — DEXTROSE MONOHYDRATE 50 MG/ML
100 INJECTION, SOLUTION INTRAVENOUS PRN
Status: CANCELLED | OUTPATIENT
Start: 2021-01-15

## 2021-01-15 RX ORDER — HYDROCODONE BITARTRATE AND ACETAMINOPHEN 5; 325 MG/1; MG/1
1 TABLET ORAL EVERY 6 HOURS PRN
Status: DISCONTINUED | OUTPATIENT
Start: 2021-01-15 | End: 2021-01-15

## 2021-01-15 RX ORDER — LIDOCAINE HCL/PF 100 MG/5ML
SYRINGE (ML) INJECTION PRN
Status: DISCONTINUED | OUTPATIENT
Start: 2021-01-15 | End: 2021-01-15 | Stop reason: SDUPTHER

## 2021-01-15 RX ORDER — DEXTROSE MONOHYDRATE 25 G/50ML
12.5 INJECTION, SOLUTION INTRAVENOUS PRN
Status: DISCONTINUED | OUTPATIENT
Start: 2021-01-15 | End: 2021-01-25 | Stop reason: HOSPADM

## 2021-01-15 RX ORDER — INSULIN GLARGINE 100 [IU]/ML
75 INJECTION, SOLUTION SUBCUTANEOUS 2 TIMES DAILY
Status: DISCONTINUED | OUTPATIENT
Start: 2021-01-15 | End: 2021-01-16

## 2021-01-15 RX ORDER — PROMETHAZINE HYDROCHLORIDE 12.5 MG/1
12.5-25 TABLET ORAL EVERY 8 HOURS PRN
Status: CANCELLED | OUTPATIENT
Start: 2021-01-15

## 2021-01-15 RX ORDER — LORAZEPAM 0.5 MG/1
0.5 TABLET ORAL 2 TIMES DAILY PRN
Status: DISCONTINUED | OUTPATIENT
Start: 2021-01-15 | End: 2021-01-25 | Stop reason: HOSPADM

## 2021-01-15 RX ORDER — ONDANSETRON 2 MG/ML
4 INJECTION INTRAMUSCULAR; INTRAVENOUS EVERY 6 HOURS PRN
Status: DISCONTINUED | OUTPATIENT
Start: 2021-01-15 | End: 2021-01-25 | Stop reason: HOSPADM

## 2021-01-15 RX ORDER — OXYCODONE HYDROCHLORIDE AND ACETAMINOPHEN 5; 325 MG/1; MG/1
1 TABLET ORAL EVERY 6 HOURS PRN
Status: DISCONTINUED | OUTPATIENT
Start: 2021-01-15 | End: 2021-01-15

## 2021-01-15 RX ORDER — LEVETIRACETAM 500 MG/1
2000 TABLET ORAL 2 TIMES DAILY
Status: DISCONTINUED | OUTPATIENT
Start: 2021-01-15 | End: 2021-01-19

## 2021-01-15 RX ORDER — OXYCODONE HYDROCHLORIDE AND ACETAMINOPHEN 5; 325 MG/1; MG/1
1 TABLET ORAL EVERY 6 HOURS PRN
Status: DISCONTINUED | OUTPATIENT
Start: 2021-01-15 | End: 2021-01-25 | Stop reason: HOSPADM

## 2021-01-15 RX ORDER — EPHEDRINE SULFATE/0.9% NACL/PF 50 MG/5 ML
SYRINGE (ML) INTRAVENOUS PRN
Status: DISCONTINUED | OUTPATIENT
Start: 2021-01-15 | End: 2021-01-15 | Stop reason: SDUPTHER

## 2021-01-15 RX ORDER — DEXTROSE MONOHYDRATE 25 G/50ML
12.5 INJECTION, SOLUTION INTRAVENOUS PRN
Status: CANCELLED | OUTPATIENT
Start: 2021-01-15

## 2021-01-15 RX ORDER — LABETALOL 20 MG/4 ML (5 MG/ML) INTRAVENOUS SYRINGE
10 EVERY 6 HOURS PRN
Status: DISCONTINUED | OUTPATIENT
Start: 2021-01-15 | End: 2021-01-25 | Stop reason: HOSPADM

## 2021-01-15 RX ORDER — FENOFIBRATE 160 MG/1
160 TABLET ORAL DAILY
Refills: 1 | Status: CANCELLED | OUTPATIENT
Start: 2021-01-15

## 2021-01-15 RX ORDER — DEXTROSE MONOHYDRATE 50 MG/ML
100 INJECTION, SOLUTION INTRAVENOUS PRN
Status: DISCONTINUED | OUTPATIENT
Start: 2021-01-15 | End: 2021-01-25 | Stop reason: HOSPADM

## 2021-01-15 RX ORDER — CEFAZOLIN SODIUM 1 G/3ML
INJECTION, POWDER, FOR SOLUTION INTRAMUSCULAR; INTRAVENOUS PRN
Status: DISCONTINUED | OUTPATIENT
Start: 2021-01-15 | End: 2021-01-15 | Stop reason: SDUPTHER

## 2021-01-15 RX ORDER — HYDROCODONE BITARTRATE AND ACETAMINOPHEN 7.5; 325 MG/1; MG/1
1 TABLET ORAL EVERY 6 HOURS PRN
Status: DISCONTINUED | OUTPATIENT
Start: 2021-01-15 | End: 2021-01-25 | Stop reason: HOSPADM

## 2021-01-15 RX ORDER — GABAPENTIN 600 MG/1
600 TABLET ORAL 4 TIMES DAILY
Status: CANCELLED | OUTPATIENT
Start: 2021-01-15

## 2021-01-15 RX ORDER — SODIUM CHLORIDE 9 MG/ML
INJECTION, SOLUTION INTRAVENOUS CONTINUOUS PRN
Status: DISCONTINUED | OUTPATIENT
Start: 2021-01-15 | End: 2021-01-15 | Stop reason: SDUPTHER

## 2021-01-15 RX ORDER — DEXTROSE MONOHYDRATE 50 MG/ML
INJECTION, SOLUTION INTRAVENOUS CONTINUOUS PRN
Status: DISCONTINUED | OUTPATIENT
Start: 2021-01-15 | End: 2021-01-15 | Stop reason: SDUPTHER

## 2021-01-15 RX ORDER — PROPOFOL 10 MG/ML
INJECTION, EMULSION INTRAVENOUS PRN
Status: DISCONTINUED | OUTPATIENT
Start: 2021-01-15 | End: 2021-01-15 | Stop reason: SDUPTHER

## 2021-01-15 RX ORDER — ONDANSETRON 2 MG/ML
4 INJECTION INTRAMUSCULAR; INTRAVENOUS EVERY 6 HOURS PRN
Status: CANCELLED | OUTPATIENT
Start: 2021-01-15

## 2021-01-15 RX ORDER — ZONISAMIDE 100 MG/1
100 CAPSULE ORAL NIGHTLY
Status: CANCELLED | OUTPATIENT
Start: 2021-01-15

## 2021-01-15 RX ORDER — ONDANSETRON 4 MG/1
4 TABLET, ORALLY DISINTEGRATING ORAL EVERY 8 HOURS PRN
Status: CANCELLED | OUTPATIENT
Start: 2021-01-15

## 2021-01-15 RX ORDER — THIAMINE HCL 100 MG
2500 TABLET ORAL DAILY
Status: CANCELLED | OUTPATIENT
Start: 2021-01-15

## 2021-01-15 RX ORDER — FENTANYL CITRATE 50 UG/ML
50 INJECTION, SOLUTION INTRAMUSCULAR; INTRAVENOUS EVERY 5 MIN PRN
Status: DISCONTINUED | OUTPATIENT
Start: 2021-01-15 | End: 2021-01-15 | Stop reason: HOSPADM

## 2021-01-15 RX ORDER — OXYCODONE HYDROCHLORIDE 5 MG/1
5 TABLET ORAL EVERY 4 HOURS PRN
Status: CANCELLED | OUTPATIENT
Start: 2021-01-15

## 2021-01-15 RX ORDER — ZONISAMIDE 100 MG/1
400 CAPSULE ORAL NIGHTLY
Status: DISCONTINUED | OUTPATIENT
Start: 2021-01-15 | End: 2021-01-25 | Stop reason: HOSPADM

## 2021-01-15 RX ORDER — DULOXETIN HYDROCHLORIDE 60 MG/1
120 CAPSULE, DELAYED RELEASE ORAL DAILY
Status: DISCONTINUED | OUTPATIENT
Start: 2021-01-15 | End: 2021-01-25 | Stop reason: HOSPADM

## 2021-01-15 RX ORDER — LORAZEPAM 1 MG/1
0.5 TABLET ORAL 2 TIMES DAILY PRN
Status: CANCELLED | OUTPATIENT
Start: 2021-01-15

## 2021-01-15 RX ORDER — LANOLIN ALCOHOL/MO/W.PET/CERES
2500 CREAM (GRAM) TOPICAL DAILY
Status: DISCONTINUED | OUTPATIENT
Start: 2021-01-15 | End: 2021-01-25 | Stop reason: HOSPADM

## 2021-01-15 RX ORDER — DIVALPROEX SODIUM 500 MG/1
500 TABLET, DELAYED RELEASE ORAL 2 TIMES DAILY
Status: CANCELLED | OUTPATIENT
Start: 2021-01-15

## 2021-01-15 RX ORDER — PANTOPRAZOLE SODIUM 40 MG/1
40 TABLET, DELAYED RELEASE ORAL 2 TIMES DAILY
Status: DISCONTINUED | OUTPATIENT
Start: 2021-01-15 | End: 2021-01-25 | Stop reason: HOSPADM

## 2021-01-15 RX ORDER — NICOTINE POLACRILEX 4 MG
15 LOZENGE BUCCAL PRN
Status: DISCONTINUED | OUTPATIENT
Start: 2021-01-15 | End: 2021-01-25 | Stop reason: HOSPADM

## 2021-01-15 RX ORDER — SODIUM CHLORIDE 0.9 % (FLUSH) 0.9 %
10 SYRINGE (ML) INJECTION EVERY 12 HOURS SCHEDULED
Status: CANCELLED | OUTPATIENT
Start: 2021-01-15

## 2021-01-15 RX ORDER — ONDANSETRON 2 MG/ML
4 INJECTION INTRAMUSCULAR; INTRAVENOUS
Status: DISCONTINUED | OUTPATIENT
Start: 2021-01-15 | End: 2021-01-15 | Stop reason: HOSPADM

## 2021-01-15 RX ORDER — DIVALPROEX SODIUM 500 MG/1
500 TABLET, DELAYED RELEASE ORAL 2 TIMES DAILY
Status: DISCONTINUED | OUTPATIENT
Start: 2021-01-15 | End: 2021-01-25 | Stop reason: HOSPADM

## 2021-01-15 RX ORDER — ATORVASTATIN CALCIUM 10 MG/1
10 TABLET, FILM COATED ORAL DAILY
Status: DISCONTINUED | OUTPATIENT
Start: 2021-01-15 | End: 2021-01-25 | Stop reason: HOSPADM

## 2021-01-15 RX ORDER — PROMETHAZINE HYDROCHLORIDE 25 MG/ML
6.25 INJECTION, SOLUTION INTRAMUSCULAR; INTRAVENOUS
Status: DISCONTINUED | OUTPATIENT
Start: 2021-01-15 | End: 2021-01-15 | Stop reason: HOSPADM

## 2021-01-15 RX ORDER — FENTANYL CITRATE 50 UG/ML
INJECTION, SOLUTION INTRAMUSCULAR; INTRAVENOUS PRN
Status: DISCONTINUED | OUTPATIENT
Start: 2021-01-15 | End: 2021-01-15 | Stop reason: SDUPTHER

## 2021-01-15 RX ORDER — SODIUM CHLORIDE 9 MG/ML
INJECTION, SOLUTION INTRAVENOUS CONTINUOUS
Status: DISCONTINUED | OUTPATIENT
Start: 2021-01-15 | End: 2021-01-18

## 2021-01-15 RX ORDER — ROPINIROLE 1 MG/1
1 TABLET, FILM COATED ORAL NIGHTLY
Status: CANCELLED | OUTPATIENT
Start: 2021-01-15

## 2021-01-15 RX ORDER — LABETALOL 20 MG/4 ML (5 MG/ML) INTRAVENOUS SYRINGE
5 EVERY 10 MIN PRN
Status: DISCONTINUED | OUTPATIENT
Start: 2021-01-15 | End: 2021-01-15 | Stop reason: HOSPADM

## 2021-01-15 RX ORDER — IBUPROFEN 200 MG
CAPSULE ORAL EVERY 8 HOURS SCHEDULED
Status: DISCONTINUED | OUTPATIENT
Start: 2021-01-15 | End: 2021-01-25 | Stop reason: HOSPADM

## 2021-01-15 RX ORDER — OXYCODONE HYDROCHLORIDE AND ACETAMINOPHEN 5; 325 MG/1; MG/1
2 TABLET ORAL EVERY 4 HOURS PRN
Status: DISCONTINUED | OUTPATIENT
Start: 2021-01-15 | End: 2021-01-25 | Stop reason: HOSPADM

## 2021-01-15 RX ORDER — PROMETHAZINE HYDROCHLORIDE 25 MG/1
12.5 TABLET ORAL EVERY 8 HOURS PRN
Status: DISCONTINUED | OUTPATIENT
Start: 2021-01-15 | End: 2021-01-15

## 2021-01-15 RX ORDER — OXYCODONE HYDROCHLORIDE 5 MG/1
10 TABLET ORAL EVERY 4 HOURS PRN
Status: CANCELLED | OUTPATIENT
Start: 2021-01-15

## 2021-01-15 RX ADMIN — LACOSAMIDE 300 MG: 100 TABLET ORAL at 22:15

## 2021-01-15 RX ADMIN — Medication 100 MG: at 09:56

## 2021-01-15 RX ADMIN — FENTANYL CITRATE 50 MCG: 50 INJECTION, SOLUTION INTRAMUSCULAR; INTRAVENOUS at 11:11

## 2021-01-15 RX ADMIN — CEFAZOLIN 2000 MG: 1 INJECTION, POWDER, FOR SOLUTION INTRAMUSCULAR; INTRAVENOUS; PARENTERAL at 13:02

## 2021-01-15 RX ADMIN — MIDAZOLAM HYDROCHLORIDE 2 MG: 1 INJECTION, SOLUTION INTRAMUSCULAR; INTRAVENOUS at 09:50

## 2021-01-15 RX ADMIN — PROPOFOL 200 MG: 10 INJECTION, EMULSION INTRAVENOUS at 09:56

## 2021-01-15 RX ADMIN — METOPROLOL TARTRATE 75 MG: 25 TABLET ORAL at 22:03

## 2021-01-15 RX ADMIN — HYDROMORPHONE HYDROCHLORIDE 0.5 MG: 1 INJECTION, SOLUTION INTRAMUSCULAR; INTRAVENOUS; SUBCUTANEOUS at 14:15

## 2021-01-15 RX ADMIN — BACITRACIN ZINC NEOMYCIN SULFATE POLYMYXIN B SULFATE: 400; 3.5; 5 OINTMENT TOPICAL at 14:15

## 2021-01-15 RX ADMIN — DEXTROSE MONOHYDRATE: 50 INJECTION, SOLUTION INTRAVENOUS at 10:22

## 2021-01-15 RX ADMIN — FENTANYL CITRATE 50 MCG: 50 INJECTION, SOLUTION INTRAMUSCULAR; INTRAVENOUS at 10:03

## 2021-01-15 RX ADMIN — CEFAZOLIN SODIUM 1 G: 1 INJECTION, SOLUTION INTRAVENOUS at 21:28

## 2021-01-15 RX ADMIN — PHENYLEPHRINE HYDROCHLORIDE 200 MCG: 10 INJECTION INTRAVENOUS at 11:25

## 2021-01-15 RX ADMIN — FENTANYL CITRATE 50 MCG: 50 INJECTION, SOLUTION INTRAMUSCULAR; INTRAVENOUS at 10:16

## 2021-01-15 RX ADMIN — HYDROMORPHONE HYDROCHLORIDE 0.5 MG: 1 INJECTION, SOLUTION INTRAMUSCULAR; INTRAVENOUS; SUBCUTANEOUS at 21:56

## 2021-01-15 RX ADMIN — ROPINIROLE 1 MG: 1 TABLET, FILM COATED ORAL at 22:02

## 2021-01-15 RX ADMIN — BACITRACIN ZINC NEOMYCIN SULFATE POLYMYXIN B SULFATE: 400; 3.5; 5 OINTMENT TOPICAL at 23:31

## 2021-01-15 RX ADMIN — ONDANSETRON HYDROCHLORIDE 4 MG: 4 INJECTION, SOLUTION INTRAMUSCULAR; INTRAVENOUS at 13:12

## 2021-01-15 RX ADMIN — FENTANYL CITRATE 25 MCG: 50 INJECTION, SOLUTION INTRAMUSCULAR; INTRAVENOUS at 11:17

## 2021-01-15 RX ADMIN — Medication 100 MG: at 12:36

## 2021-01-15 RX ADMIN — SODIUM CHLORIDE: 9 INJECTION, SOLUTION INTRAVENOUS at 09:12

## 2021-01-15 RX ADMIN — HYDROMORPHONE HYDROCHLORIDE 0.5 MG: 1 INJECTION, SOLUTION INTRAMUSCULAR; INTRAVENOUS; SUBCUTANEOUS at 14:10

## 2021-01-15 RX ADMIN — HYDROMORPHONE HYDROCHLORIDE 0.5 MG: 1 INJECTION, SOLUTION INTRAMUSCULAR; INTRAVENOUS; SUBCUTANEOUS at 14:00

## 2021-01-15 RX ADMIN — LEVETIRACETAM 2000 MG: 100 INJECTION, SOLUTION INTRAVENOUS at 23:26

## 2021-01-15 RX ADMIN — LABETALOL 20 MG/4 ML (5 MG/ML) INTRAVENOUS SYRINGE 10 MG: at 18:27

## 2021-01-15 RX ADMIN — FENTANYL CITRATE 50 MCG: 50 INJECTION, SOLUTION INTRAMUSCULAR; INTRAVENOUS at 10:47

## 2021-01-15 RX ADMIN — FENTANYL CITRATE 25 MCG: 50 INJECTION, SOLUTION INTRAMUSCULAR; INTRAVENOUS at 11:47

## 2021-01-15 RX ADMIN — ZONISAMIDE 400 MG: 100 CAPSULE ORAL at 22:03

## 2021-01-15 RX ADMIN — FENTANYL CITRATE 50 MCG: 50 INJECTION, SOLUTION INTRAMUSCULAR; INTRAVENOUS at 09:56

## 2021-01-15 RX ADMIN — SODIUM CHLORIDE: 9 INJECTION, SOLUTION INTRAVENOUS at 09:49

## 2021-01-15 RX ADMIN — LIDOCAINE HYDROCHLORIDE 10 ML: 20 SOLUTION ORAL; TOPICAL at 21:22

## 2021-01-15 RX ADMIN — LIDOCAINE HYDROCHLORIDE 10 ML: 20 SOLUTION ORAL; TOPICAL at 18:01

## 2021-01-15 RX ADMIN — PHENYLEPHRINE HYDROCHLORIDE 200 MCG: 10 INJECTION INTRAVENOUS at 12:10

## 2021-01-15 RX ADMIN — HYDROMORPHONE HYDROCHLORIDE 0.5 MG: 1 INJECTION, SOLUTION INTRAMUSCULAR; INTRAVENOUS; SUBCUTANEOUS at 13:55

## 2021-01-15 RX ADMIN — CEFAZOLIN 2000 MG: 1 INJECTION, POWDER, FOR SOLUTION INTRAMUSCULAR; INTRAVENOUS; PARENTERAL at 10:00

## 2021-01-15 RX ADMIN — PHENYLEPHRINE HYDROCHLORIDE 100 MCG: 10 INJECTION INTRAVENOUS at 13:18

## 2021-01-15 RX ADMIN — FENTANYL CITRATE 50 MCG: 50 INJECTION, SOLUTION INTRAMUSCULAR; INTRAVENOUS at 14:05

## 2021-01-15 RX ADMIN — PHENYLEPHRINE HYDROCHLORIDE 100 MCG: 10 INJECTION INTRAVENOUS at 11:56

## 2021-01-15 RX ADMIN — HYDROMORPHONE HYDROCHLORIDE 0.5 MG: 1 INJECTION, SOLUTION INTRAMUSCULAR; INTRAVENOUS; SUBCUTANEOUS at 18:27

## 2021-01-15 RX ADMIN — Medication 10 MG: at 12:34

## 2021-01-15 RX ADMIN — PHENYLEPHRINE HYDROCHLORIDE 200 MCG: 10 INJECTION INTRAVENOUS at 11:35

## 2021-01-15 RX ADMIN — ROCURONIUM BROMIDE 30 MG: 10 INJECTION INTRAVENOUS at 09:57

## 2021-01-15 RX ADMIN — OXYCODONE HYDROCHLORIDE AND ACETAMINOPHEN 2 TABLET: 5; 325 TABLET ORAL at 17:18

## 2021-01-15 RX ADMIN — PROPOFOL 30 MG: 10 INJECTION, EMULSION INTRAVENOUS at 12:37

## 2021-01-15 RX ADMIN — ROCURONIUM BROMIDE 20 MG: 10 INJECTION INTRAVENOUS at 10:02

## 2021-01-15 RX ADMIN — PHENYLEPHRINE HYDROCHLORIDE 200 MCG: 10 INJECTION INTRAVENOUS at 12:15

## 2021-01-15 RX ADMIN — ONDANSETRON 4 MG: 2 INJECTION INTRAMUSCULAR; INTRAVENOUS at 23:26

## 2021-01-15 RX ADMIN — FENTANYL CITRATE 50 MCG: 50 INJECTION, SOLUTION INTRAMUSCULAR; INTRAVENOUS at 14:10

## 2021-01-15 ASSESSMENT — PULMONARY FUNCTION TESTS
PIF_VALUE: 5
PIF_VALUE: 21
PIF_VALUE: 20
PIF_VALUE: 18
PIF_VALUE: 21
PIF_VALUE: 19
PIF_VALUE: 19
PIF_VALUE: 22
PIF_VALUE: 4
PIF_VALUE: 18
PIF_VALUE: 22
PIF_VALUE: 18
PIF_VALUE: 22
PIF_VALUE: 34
PIF_VALUE: 20
PIF_VALUE: 22
PIF_VALUE: 19
PIF_VALUE: 1
PIF_VALUE: 19
PIF_VALUE: 17
PIF_VALUE: 6
PIF_VALUE: 2
PIF_VALUE: 22
PIF_VALUE: 17
PIF_VALUE: 18
PIF_VALUE: 20
PIF_VALUE: 19
PIF_VALUE: 18
PIF_VALUE: 14
PIF_VALUE: 9
PIF_VALUE: 22
PIF_VALUE: 20
PIF_VALUE: 20
PIF_VALUE: 22
PIF_VALUE: 23
PIF_VALUE: 15
PIF_VALUE: 21
PIF_VALUE: 19
PIF_VALUE: 20
PIF_VALUE: 23
PIF_VALUE: 22
PIF_VALUE: 0
PIF_VALUE: 23
PIF_VALUE: 23
PIF_VALUE: 19
PIF_VALUE: 18
PIF_VALUE: 13
PIF_VALUE: 23
PIF_VALUE: 22
PIF_VALUE: 21
PIF_VALUE: 4
PIF_VALUE: 17
PIF_VALUE: 26
PIF_VALUE: 22
PIF_VALUE: 20
PIF_VALUE: 22
PIF_VALUE: 22
PIF_VALUE: 20
PIF_VALUE: 19
PIF_VALUE: 19
PIF_VALUE: 23
PIF_VALUE: 20
PIF_VALUE: 6
PIF_VALUE: 20
PIF_VALUE: 7
PIF_VALUE: 19
PIF_VALUE: 23
PIF_VALUE: 5
PIF_VALUE: 19
PIF_VALUE: 20
PIF_VALUE: 14
PIF_VALUE: 20
PIF_VALUE: 20
PIF_VALUE: 4
PIF_VALUE: 23
PIF_VALUE: 18
PIF_VALUE: 22
PIF_VALUE: 1
PIF_VALUE: 22
PIF_VALUE: 18
PIF_VALUE: 18
PIF_VALUE: 23
PIF_VALUE: 21
PIF_VALUE: 20
PIF_VALUE: 23
PIF_VALUE: 20
PIF_VALUE: 23
PIF_VALUE: 22
PIF_VALUE: 18
PIF_VALUE: 22
PIF_VALUE: 22
PIF_VALUE: 24
PIF_VALUE: 17
PIF_VALUE: 19
PIF_VALUE: 7
PIF_VALUE: 19
PIF_VALUE: 18
PIF_VALUE: 4
PIF_VALUE: 23
PIF_VALUE: 18
PIF_VALUE: 22
PIF_VALUE: 21
PIF_VALUE: 23
PIF_VALUE: 20
PIF_VALUE: 22
PIF_VALUE: 22
PIF_VALUE: 20
PIF_VALUE: 31
PIF_VALUE: 22
PIF_VALUE: 19
PIF_VALUE: 19
PIF_VALUE: 17
PIF_VALUE: 19
PIF_VALUE: 22
PIF_VALUE: 22
PIF_VALUE: 19
PIF_VALUE: 24
PIF_VALUE: 20
PIF_VALUE: 22
PIF_VALUE: 5
PIF_VALUE: 10
PIF_VALUE: 22
PIF_VALUE: 20
PIF_VALUE: 17
PIF_VALUE: 21
PIF_VALUE: 29
PIF_VALUE: 20
PIF_VALUE: 22
PIF_VALUE: 19
PIF_VALUE: 22
PIF_VALUE: 23
PIF_VALUE: 23

## 2021-01-15 ASSESSMENT — PAIN DESCRIPTION - FREQUENCY
FREQUENCY: CONTINUOUS

## 2021-01-15 ASSESSMENT — PAIN - FUNCTIONAL ASSESSMENT
PAIN_FUNCTIONAL_ASSESSMENT: PREVENTS OR INTERFERES SOME ACTIVE ACTIVITIES AND ADLS
PAIN_FUNCTIONAL_ASSESSMENT: PREVENTS OR INTERFERES SOME ACTIVE ACTIVITIES AND ADLS

## 2021-01-15 ASSESSMENT — PAIN DESCRIPTION - LOCATION
LOCATION: NECK;THROAT

## 2021-01-15 ASSESSMENT — PAIN DESCRIPTION - DESCRIPTORS
DESCRIPTORS: ACHING;BURNING

## 2021-01-15 ASSESSMENT — PAIN SCALES - GENERAL
PAINLEVEL_OUTOF10: 10
PAINLEVEL_OUTOF10: 0
PAINLEVEL_OUTOF10: 10

## 2021-01-15 ASSESSMENT — PAIN DESCRIPTION - ONSET
ONSET: ON-GOING
ONSET: ON-GOING

## 2021-01-15 ASSESSMENT — PAIN DESCRIPTION - PAIN TYPE
TYPE: ACUTE PAIN

## 2021-01-15 ASSESSMENT — PAIN DESCRIPTION - PROGRESSION
CLINICAL_PROGRESSION: NOT CHANGED
CLINICAL_PROGRESSION: NOT CHANGED

## 2021-01-15 NOTE — PROGRESS NOTES
Pt arrived in 4K 24 from surgery. Complaints: Neck pain  IV normal saline infusing into the forearm right, condition patent and no redness at a rate of 100 mls/ hour with about 700 mls remaining in the bag. The best day to schedule a follow up Dr appointment is:  Monday a.m.       The patient is interested in [facility]. [facility] meds to [facility] program?:  Yes

## 2021-01-15 NOTE — CARE COORDINATION
DISASTER CHARTING    1/15/21, 3:16 PM EST    DISCHARGE ONGOING EVALUATION:     [address] [facility] day: 0  Location: 4-24/024-A Reason for admit: Follicular neoplasm of thyroid [D49.7]   Barriers to Discharge: From SDS/OP In A Bed w thyroid lobectomy, uvulopalatopharyngoplasy.  4L Oxygen continued; ENT following  PCP: Jannet Jama MD  Patient Goals/Plan/Treatment Preferences: lives w SO, needs meet/greet

## 2021-01-15 NOTE — ANESTHESIA PRE PROCEDURE
pantoprazole (PROTONIX) 40 MG tablet Take 1 tablet by mouth 2 times daily 8/12/19  Yes Ankita Franklin MD   Melatonin 10 MG TABS Take by mouth nightly 3 tab   Yes Historical Provider, MD   DULoxetine (CYMBALTA) 60 MG extended release capsule Take by mouth daily 2 tab   Yes Historical Provider, MD   lacosamide (VIMPAT) 100 MG TABS tablet Take by mouth 2 times daily. 3 tab   Yes Historical Provider, MD   sildenafil (VIAGRA) 100 MG tablet Take 1 tablet by mouth as needed for Erectile Dysfunction 1/29/19  Yes Ankita Franklin MD   divalproex (DEPAKOTE) 500 MG DR tablet Take 500 mg by mouth 2 times daily  4/5/18  Yes Historical Provider, MD   levETIRAcetam (KEPPRA) 500 MG tablet Take by mouth 2 times daily 4 tab   Yes Historical Provider, MD   LORazepam (ATIVAN) 0.5 MG tablet Take 0.5 mg by mouth 2 times daily as needed (seizures)    Yes Historical Provider, MD   zonisamide (ZONEGRAN) 100 MG capsule Take by mouth nightly 4 tab   Yes Historical Provider, MD       Current medications:    Current Facility-Administered Medications   Medication Dose Route Frequency Provider Last Rate Last Admin    0.9 % sodium chloride infusion   Intravenous Continuous Dory Barraza MD           Allergies:     Allergies   Allergen Reactions    Ciprofloxacin-Ciproflox Hcl Er Other (See Comments)     Elevated creatinine    Heparin      Monitor for thrombocytopenia    Metformin Nausea Only     Abdominal pain, diarrhea    Niacin And Related Other (See Comments)     Burning sensation, severe flushing    Tramadol Hcl      Contraindication to seizure medications    Ultram [Tramadol]      Contraindication to seizure medications    Warfarin      Very difficult to regulate in past       Problem List:    Patient Active Problem List   Diagnosis Code    Depression F32.9    Liver disease K76.9    Restless legs syndrome G25.81    Chronic back pain M54.9, G89.29    Other chest pain R07.89    Irritable bowel syndrome K58.9  Type II diabetes mellitus with manifestations, uncontrolled (HCC) E11.8, E11.65    Chondromalacia of knee, right M94.261    Effusion of left knee M25.462    Elevated levels of transaminase & lactic acid dehydrogenase R74.01, R74.02    Other intervertebral disc degeneration, lumbar region M51.36    Palpitations R00.2    Splenomegaly R16.1    Sprain of right foot S93.601A    Steatohepatitis K75.81    Osteopenia M85.80    Obstructive sleep apnea S11.24    Follicular neoplasm of thyroid D49.7    Nasal obstruction J34.89    Nasal septal deviation J34.2    Chest pain R07.9       Past Medical History:        Diagnosis Date    Abnormal thyroid biopsy     Acid reflux     Anemia     resolved - previously seen by Dr. Monico Pham (due to enlarged spleen)    Anesthesia     seizures with brain surgery due to blood sugar got really high    B[facility] disorder ([facility]     Blood clot in vein [facility]07/2016    [facility]) 04/2019    thyroid    Chest pain     previously seeing [facility] cardiologist, now seeing Dr. Francisco Beavers (LAD bridging on cath 4/2016)    Chronic back pain     Chronic bronchitis ([facility]     Dr. Ginger Whitfield Colon polyp 08/30/2016    Depression     seeing Rubén Milton DVT (deep venous thrombosis) ([facility] 4591-2650    not on Coumadin due to unable to regulate - Dr. Angus Caballero - no etiology found per patient    Esophageal abnormality     nodule     Fatty liver disease, nonalcoholic     U/S 32/9933 [facility]    Furuncle     legs    History of Doppler ultrasound 05/29/2011    No hemodynamically significant carotid stenosis is identified. A thyroid nodule on each side. Dedicated ultrasound of thyroid gland is suggested to further evaluate if clinically indicated.      History of pulmonary embolism 2007    s/p GFF, related to knee surgery    Hx of blood clots     leg and lung    Hyperlipidemia     severely elevated triglycerides    Hypertension     diastolic    Intracranial arachnoid cyst 11/2012 Tilt table was associated w/ nonspecific symptoms or nausea, otherwise no significant dizziness or syncope. No significant hemodynamic changes. Otherwise, unremarkable tilt table test after 30 minutes of tilting.  OTHER SURGICAL HISTORY  01/20/2017    RIGHT SHOULDER ARTHROSCOPY, OPEN STAN, OPEN ACROMIOPLASTY, BICEP TENDESIS, RIGHT CARPAL TUNNEL RELEASE    SHOULDER SURGERY Right 01/2007    TONSILLECTOMY      TRANSTHORACIC ECHOCARDIOGRAM  3-05-11    LV size and systolic function normal. EF 55-65%.  UPPER GASTROINTESTINAL ENDOSCOPY  2012    UPPER GASTROINTESTINAL ENDOSCOPY  2016    Dr. Jerica Granger Left 10/22/2019    [facility]    EGD BIOPSY performed by Yola Alvarenga MD at [facility]   [facility]  2007       Social History:    Social History     Tobacco Use    Smoking status: Never Smoker    Smokeless tobacco: Never Used   Substance Use Topics    Alcohol use: No     Alcohol/week: 0.0 standard drinks                                Counseling given: Not Answered      Vital Signs (Current):   Vitals:    01/07/21 1720 01/15/21 0838   BP:  (!) 148/87   Pulse:  83   Resp:  16   Temp:  97.6 °F (36.4 °C)   TempSrc:  Temporal   SpO2:  96%   Weight: 230 lb (104.3 kg) 252 lb 12.8 oz (114.7 kg)   Height: 6' 2\" (1.88 m) 6' 2\" (1.88 m)                                              BP Readings from Last 3 Encounters:   01/15/21 (!) 148/87   01/12/21 130/80   01/06/21 128/70       NPO Status:                                                                                 BMI:   Wt Readings from Last 3 Encounters:   01/15/21 252 lb 12.8 oz (114.7 kg)   01/12/21 243 lb 6.4 oz (110.4 kg)   01/06/21 241 lb 11.2 oz (109.6 kg)     Body mass index is 32.46 kg/m².     CBC:   Lab Results   Component Value Date    WBC 7.9 12/29/2020    RBC 5.31 12/29/2020 RBC 4.93 03/26/2012    HGB 16.5 12/29/2020    HCT 45.0 12/29/2020    MCV 84.7 12/29/2020    RDW 13.5 06/05/2018     12/29/2020       CMP:   Lab Results   Component Value Date     12/29/2020    K 4.0 12/29/2020    CL 97 12/29/2020    CO2 28 12/29/2020    BUN 9 12/29/2020    CREATININE 0.5 12/29/2020    LABGLOM >90 12/29/2020    GLUCOSE 422 12/29/2020    GLUCOSE 189 11/12/2015    PROT 7.1 12/29/2020    CALCIUM 10.0 12/29/2020    BILITOT 1.3 12/29/2020    ALKPHOS 104 12/29/2020    AST 14 12/29/2020    ALT 24 12/29/2020       POC Tests: No results for input(s): POCGLU, POCNA, POCK, POCCL, POCBUN, POCHEMO, POCHCT in the last 72 hours. Coags:   Lab Results   Component Value Date    INR 1.05 06/05/2018    APTT 35.7 04/27/2016       HCG (If Applicable): No results found for: PREGTESTUR, PREGSERUM, HCG, HCGQUANT     ABGs: No results found for: PHART, PO2ART, TYI1PRQ, IOR3WPC, BEART, Y6ALLWMD     Type & Screen (If Applicable):  Lab Results   Component Value Date    LABRH NEG 04/28/2016       Drug/Infectious Status (If Applicable):  No results found for: HIV, HEPCAB    COVID-19 Screening (If Applicable):   Lab Results   Component Value Date    COVID19 Detected 11/11/2020         Anesthesia Evaluation   no history of anesthetic complications:   Airway: Mallampati: III  TM distance: >3 FB   Neck ROM: full  Mouth opening: > = 3 FB Dental:          Pulmonary:normal exam    (+) sleep apnea:            Patient did not smoke on day of surgery. Cardiovascular:  Exercise tolerance: good (>4 METS),   (+) hypertension:, CAD:,                   Neuro/Psych:   (+) seizures: well controlled, headaches:, psychiatric history:            GI/Hepatic/Renal:   (+) GERD:,           Endo/Other:    (+) Diabetes, . Pt had no PAT visit       Abdominal:           Vascular:   + DVT, . Anesthesia Plan      general     ASA 3       Induction: intravenous. MIPS: Postoperative opioids intended and Prophylactic antiemetics administered. Anesthetic plan and risks discussed with patient. Plan discussed with CRNA.                   Leatha Nuno MD   1/15/2021

## 2021-01-15 NOTE — CONSULTS
Hospitalist Progress Note      Patient:  Alejandrina Hayes    Unit/Bed:4K-24/024-A  YOB: 1970  MRN: [id number]   Acct: [de-identified]   PCP: Fidel Johansen MD  Date of Admission: 1/15/2021    Assessment/Plan:    1. 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 Patient tolerated surgery well. ENT attending/managing. 2. Seizure disorder: Complex history with dynamic seizure medication dosing per primary. Continue home doses of Depakote, Keppra, zonisamide, and lacosamide. Continue to monitor closely, maintain seizure precautions. 3. Type 2 diabetes mellitus: Hemoglobin A1c 9.3 on 12/22/2020. Current blood glucose level at 225. Start Accu-Cheks before meals and at bedtime with sliding scale insulin coverage (low-dose corrective algorithm). Hold Lantus and additional mealtime coverage insulin until patient is able to tolerate more substantial diet; continue Jardiance. Hypoglycemia protocol in place, maintain carb controlled diet when cleared by primary. 4. Essential hypertension: History, not at goal.  Likely secondary to pain response. Continue metoprolol. Labetalol available PRN for SBP >165. 5. Hyperlipidemia: Continue fenofibrate and atorvastatin. 6. GERD: Continue Protonix. 7. Depression, bipolar disorder: Continue Cymbalta. 8. Restless leg syndrome: Continue Requip. 9. Sleep apnea: History, noted.   10. Obesity: BMI 32.46    Chief Complaint: Follicular neoplasm of the left thyroid    Initial H and P:-    Leoncio Cunningham is a 59-year-old  male, lifetime non-smoker, with a medical history of hyperlipidemia, hypertension, restless leg syndrome, chronic back pain, irritable bowel syndrome, seizure disorder, neuropathy, type 2 diabetes mellitus, anemia, chronic bronchitis, acid reflux, liver disease, depression, bipolar disorder, sleep apnea, pancreatic insufficiency, systolic dysfunction, and obesity. Patient presented to [facility] for a thyroid lobectomy. Patient has a history of a follicular neoplasm of the thyroid and Dr. Juan Bahena performed the thyroid lobectomy on 1/15/2021, the patient tolerated the procedure well. Postoperatively, the patient remained hemodynamically stable and was transitioned to the stepdown unit. The hospitalist service was consulted for medical management. Subjective (past 24 hours):   Patient resting in bed with wife at the bedside. States that he is still having tenderness/pain to his throat after the surgery but is doing decent for the most part overall. He denies any other physical complaints, was updated on the current plan of care, verbalizes understanding, and had no other needs or questions at this time. Past medical history, family history, social history and allergies reviewed again and is unchanged since admission. ROS (14 point review of systems completed. Pertinent positives noted. Otherwise ROS is negative) :  GENERAL: No fever,chills, or night sweats. SKIN: No lesions or rashes. HEAD: No headaches or recent injury. EYES: No acute changes in vision, no diplopia or blurred vision. EARS: No hearing loss, no tinnitus. NOSE/THROAT: S/p thyroid lobectomy, patient endorses tenderness/pain to the throat. NECK: No lumps or unusual neck stiffness. PULMONARY: Respirations easy and non-labored, no acute distress. CARDIAC: No chest pain, pressure, Negative for lower leg edema. GI: Abdomen is soft and non-tender, non-distended. PERIPHERAL VASCULAR: No intermittent claudication or unusual leg cramps. MUSCULOSKELETAL: Occasional arthralgias, myalgias. NEUROLOGICAL: Denies any headache, near syncope, seizures or syncope. HEMATOLOGIC:  No unusual bruising or bleeding. PSYCH: Denies any homicidal or suicidial ideations.     Medications:  Reviewed    Infusion Medications    sodium chloride 100 mL/hr at 01/15/21 0912     Scheduled Medications    neomycin-bacitracin-polymyxin   Topical 3 times per day    atorvastatin  10 mg Oral Daily    Vitamin B-12  2,500 mcg Sublingual Daily    divalproex  500 mg Oral BID    DULoxetine  60 mg Oral BID    empagliflozin  25 mg Oral Daily    fenofibrate  54 mg Oral Daily    insulin glargine  75 Units Subcutaneous BID    insulin lispro (1 Unit Dial)  30 Units Subcutaneous TID    lacosamide  300 mg Oral BID    levETIRAcetam  2,000 mg Oral BID    metoprolol tartrate  75 mg Oral BID    pantoprazole  40 mg Oral BID    rOPINIRole  1 mg Oral TID    zonisamide  400 mg Oral Nightly    ceFAZolin  1 g Intravenous Q8H     PRN Meds: LORazepam, promethazine, oxyCODONE-acetaminophen **OR** oxyCODONE-acetaminophen, HYDROmorphone **OR** HYDROmorphone, HYDROcodone-acetaminophen, lidocaine viscous hcl      Intake/Output Summary (Last 24 hours) at 1/15/2021 1624  Last data filed at 1/15/2021 1512  Gross per 24 hour   Intake 2350 ml   Output 310 ml   Net 2040 ml       Diet:  DIET CLEAR LIQUID;    Exam:  BP (!) 165/93   Pulse 90   Temp 98.3 °F (36.8 °C) (Oral)   Resp 12   Ht 6' 2\" (1.88 m)   Wt 252 lb 12.8 oz (114.7 kg)   SpO2 96%   BMI 32.46 kg/m²     General appearance: Alert and appropriate, pleasant adult male. No apparent distress, appears stated age and cooperative. HEENT: Pupils equal, round, and reactive to light. Conjunctivae/corneas clear. Tenderness noted to the throat. Neck: Supple, with full range of motion. No jugular venous distention. Trachea midline. Respiratory:  Normal respiratory effort. Clear to auscultation, bilaterally without Rales/Wheezes/Rhonchi. Cardiovascular: Regular rate and rhythm with normal S1/S2 without murmurs, rubs or gallops. Abdomen: Soft, non-tender, non-distended with normal bowel sounds. Musculoskeletal: Passive and active ROM x 4 extremities. Skin: Skin color, texture, turgor normal.  No rashes or lesions.   Neurologic:  Neurovascularly intact without any focal sensory/motor deficits. Cranial nerves: II-XII intact, grossly non-focal.  Psychiatric: Alert and oriented to person, place, time, and situation. Thought content appropriate, normal insight  Capillary Refill: Brisk,< 3 seconds   Peripheral Pulses: +2 palpable, equal bilaterally     Labs:   No results for input(s): WBC, HGB, HCT, PLT in the last 72 hours. No results for input(s): NA, K, CL, CO2, BUN, CREATININE, CALCIUM, PHOS in the last 72 hours. Invalid input(s): MAGNES  No results for input(s): AST, ALT, BILIDIR, BILITOT, ALKPHOS in the last 72 hours. No results for input(s): INR in the last 72 hours. No results for input(s): Shanelle Lodi in the last 72 hours. Microbiology:    Blood culture #1:   Lab Results   Component Value Date    BC No growth-preliminaryNo growth 08/12/2019       Blood culture #2:No results found for: Richi Toledo    Organism:  Lab Results   Component Value Date    ORG Staphylococcus capitis 04/12/2016       No results found for: LABGRAM    MRSA culture only:No results found for: [facility]    Urine culture:   Lab Results   Component Value Date    LABURIN No growth-preliminaryNo growth 08/12/2019       Respiratory culture: No results found for: CULTRESP    Aerobic and Anaerobic :  No results found for: LABAERO  No results found for: LABANAE    Urinalysis:      Lab Results   Component Value Date    NITRU NEGATIVE 11/15/2020    WBCUA NONE 08/12/2019    WBCUA 6-10 10/17/2015    BACTERIA NONE 08/12/2019    RBCUA NONE 08/12/2019    BLOODU NEGATIVE 11/15/2020    SPECGRAV >1.030 08/12/2019    GLUCOSEU >= 1000 11/15/2020       Radiology:  No orders to display     No results found. **This report has been created using voice recognition software. It may contain minor errors which are inherent in voice recognition technology. **  Electronically signed by NICANOR Armstrong CNP on 1/15/2021 at 4:24 PM

## 2021-01-15 NOTE — PROGRESS NOTES
Patient admitted to [facility] room 7 with S/O at bedside. Bed in low position side rails up call light in reach. Patient denies questions at this time.

## 2021-01-15 NOTE — OP NOTE
Operative Note      Patient: Fallon Billing  YOB: 1970  MRN: [id number]    Date of Procedure: 1/15/2021    Pre-Op Diagnosis: FOLLICULAR NEOPLASM OF THYROID OBSTRUCTIVE SLEEP APNEA, SNORING, OBESITY    Post-Op Diagnosis: Same       Procedure(s):  THYROID LOBECTOMY, UVULOPALATOPHARYNGOPLAST LAOP    Surgeon(s):  Anna Patricio MD    Assistant:   Surgical Assistant: Cecily Carrington    Anesthesia: General    Estimated Blood Loss (mL): less than 854     Complications: None    Specimens:   ID Type Source Tests Collected by Time Destination   A : bilateral completion tonsillectomy uvulectomy  Tissue Thyroid SURGICAL PATHOLOGY Anna Patricio MD 1/15/2021 1039    B : left hemithyroid & isthmus Tissue Thyroid SURGICAL PATHOLOGY Anna Patricio MD 1/15/2021 1306        Implants:  * No implants in log *      Drains:   Closed/Suction Drain Anterior Neck Bulb 15 Frisian (Active)       Findings: 1. long soft palate with redundant mucosa; persistent tonsils distally; removed  2. Moderately fibrosed left hemithyroid  3. Hypervascular hemithyroid and isthmus    Detailed Description of Procedure: The patient was taken to the operating room awake and placed in the supine position. General anesthesia was induced and the patient was intubated with a 7.0 endotracheal tube without difficulty or vital sign instability. The table was turned 90 degrees for the procedure. I performed a timeout verifying the patient's identity and planned procedure. Uvulopalatopharyngoplasty and bilateral completion tonsillectomy: Placing a Herb Means oral retractor modified for edentulous maxilla, I extended the patient's jaw open and tongue distally to provide a panoramic view of the patient's oropharynx. I found these tissues to be abnormal for the presence of marked laxity with residual tonsil tissue seen in the inferior aspect of both tonsillar fossa. I injected the fossa with 1-100,000 of epinephrine mixed in sterile saline. I used approximately 2 cc of this mixture. I then used a 12 blade to incise the tonsillar fossa mucosa and the distal soft palate on the ventral side followed by separate incision from the posterior pillars across the dorsal surface of the palate behind the uvula all the way to the opposite side. Using a Agustin retractor to elevate the mucosa anterolaterally, I placed an curved Allis clamp on the right tonsillar mucosa and tonsil tissue and had my assistant hold a Agustin pillar retractor on the anterior lateral mucosa. With a combination of blunt and Bovie dissection I resected the mucosa in this fossa leaving the muscle deep while cauterizing the feeding blood vessels to the region definitively. I reflected this tissue medially until I reached as far distally as I could gain access to where I transversely cut and cauterized the tissue to promote hemostasis postoperatively. I then pedicled it on the posterior pillar which I had already incised with the 12 blade. I divided the submucosal attachments of that posterior pillar incision and lifted the tonsil fossa mucosa and its contents medially and towards the left. I incised the soft palate mucosa along with some of the submucosa in an oblique manner from anterior to posterior and from ventral to dorsal.  I drew this tissue towards the left until I  completely from the soft palate. I left it anchored on the top of the left tonsil fossa contents. I then dissected the anterior pillar away on this side as I had the right side, clamped the tonsillar fossa and retracted medially while my assistant held the pillar retractor anteriorly. I dissected this tissues away from the tonsillar fossa encountering residual tonsil tissue on this side like I had on the right which I dissected from the muscular bed and then crossed the inferior portion of this tissue as far distally as I could reach.   I pedicled it on the posterior pillar and then   in 1 continuous specimen from left to right. I handed off the field be placed in formalin for permanent section. Left hemithyroidectomy and isthmusectomy: Following the UPPP, the patient's head was transferred to a Womack head martínez and the patient's endotracheal tube was changed to a nerve integrity monitor tube for the thyroidectomy. The table was turned 90 degrees back towards anesthesia for the procedure. I injected 5 cc of 1-50,000 epinephrine and saline across the neck skin crease 2 fingerbreadths above the thoracic inlet. The patient was then prepped and draped in the usual fashion for sterile approach to the deep neck. With a 15 blade I made a transverse incision through the skin and subcutaneous tissue down to the platysma. I worked at hemostasis through this relatively thick tissue that was moderately vascular and then divided the platysma across the midline from left to right to the medial most aspect of the right platysmal layer. I lifted up inferiorly and superiorly based subplatysmal flaps that were all biased towards the left side for left side thyroid operation. I placed 2 Gelpi retractors on either side of the wound and turned my attention to the deep space. Dissecting the fascia overlying the sternocleidomastoid, I exposed the strap muscles and isolated them with the SCM, identifying the thyroid isthmus and reflecting the soft tissue off of this layer laterally into the left. I then used a LigaSure device to dissect underneath the thyroid isthmus to the right of midline at its attachment to the right thyroid lobe. I dissected it cephalad and with 3 passes of this LigaSure device I was able to separate the thyroid isthmus from the right side leaving it attached to the left hemithyroid. I reflected this tissue laterally towards the left using bipolar cautery for the perforating vessels between the trachea and the thyroid.     Turning my attention to the inferior pole, I dissected the soft tissue surrounding the inferior pole from the superficial aspect until I was sufficiently deep to be getting near to the anatomy where I would expect to find the recurrent laryngeal nerve. Nerve integrity monitoring was in place throughout the thyroidectomy. And then triangulated the region where the recurrent nerve would be carefully dissecting the soft tissue elements in the region until I identified the recurrent laryngeal nerve and verified it with a nerve stimulator set at 0.5 mA. And then traced up the nerve well under the ligament of Howell. Turned my attention laterally and cephalad, I divided the soft tissue attachments and vascular attachments of the lateral thyroid going up the lateral and lateral deep aspects to isolate the superior pole of the gland. I used the LigaSure on small sejal of tissue and small and medium vascular clips on the obvious vessels. In this manner I preserved at least one of the parathyroid glands that I found along the posterior inferior and lateral aspect of the gland. It retained its inferiorly based vasculature. Working to separate the superior pole, I used a right angle forcep to raise the vascular elements away from the gland. I used the medium clip applier with 2 clips proximal and 1 distal to gradually separate the superior pole fully. I used a [place] retractor clamp for manipulating the thyroid in this region. And then divided the inferior medial aspect of the superior pole fascia in order to mobilize the superior pole fully. Interim attention back to the region of the recurrent laryngeal nerve which I was now able to trace superiorly bipolar cauterizing the superior medial and anterior medial soft tissue elements to mobilize the gland fully. There were a few minor attachments in the deep space which I divided freeing up the gland so that it could be handed off the field to be placed in formalin for permanent section.   I carefully examined the wound and found no active bleeding. I stimulated the recurrent laryngeal nerve again from the lowest access to the nerve and found excellent stimulation indicating intact nerve integrity. I irrigated out the wound with copious amounts of sterile saline and then had the anesthesia team give the patient a Valsalva maneuver to 40 cm of water pressure without any new bleeding being encountered. I placed a deep 15 [facility] round slotted Casimiro drain through a separate stab incision into the deep neck and run through the subplatysmal space for dual level drainage. I closed the wound in layers with 3-0 Monocryl on an [facility] taper needle in inverted figure-of-eight configurations. My assistant then closed the skin surface with a 4-0 Monocryl in a P3 cutting needle in a running subcuticular manner. I used wound glue on the surface. I sutured the drain in with the 4-0 nylon on a cutting needle. I placed antibiotic ointment at the base of the drain. As such extent of the operation concluded. The patient was reversed from general anesthesia and extubated in the operating room without difficulty. He was taken to recovery in satisfactory condition. Estimated blood loss in the UPPP was approximately 10 cc and in the thyroidectomy was approximately 50 cc. Patient received 2 and half liters of lactated Ringer's and produced 200 cc of urine output. The [place] catheter was DC'd in the operating room. Counts were considered accurate x3. No blood pressure transfused. No intraoperative complications were detected. I was present for and performed the patient's entire operation up to the skin closure. Disposition: The patient will be admitted to a monitored bed for close observation and medical care given his insulin-dependent diabetes and his obstructive sleep apnea tendency as well as the extensive surgery he just had. I will ask for the hospitalist service to come alongside me to help manage his medical issues.     Rashad Kidney. Mira Chicas MD, [facility]  Otolaryngology Head and Neck Surgery  Laryngology Bronchoesophagology  Cell: [phone]      Electronically signed by Rizwana Red MD on 1/15/2021 at 2:01 PM

## 2021-01-15 NOTE — H&P
HISTORY AND PHYSICAL             Date: 1/15/2021        Patient Name: Donovan Vargas     YOB: 1970      Age:  48 y.o. Chief Complaint   No chief complaint on file. 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 His repeat ultrasound found low risk ultrasonographic findings but fine-needle aspiration most recently done determine more important atypia of the cells within the neoplasm that may represent a follicular malignancy. As such the patient desires to have the hemithyroid removed so I can be more definitively analyzed with the potential that he will need a completion thyroidectomy in the future. In addition he has obstructive sleep apnea refractory to CPAP and wishes to have surgical management of this condition and so far so can be either improved or fully rectified. He was seen on a couple of weeks ago my clinic service attached note still qualifies as an accurate rendition of his current condition and the surgical plan for him.     Past Medical History     Past Medical History:   Diagnosis Date    Abnormal thyroid biopsy     Acid reflux     Anemia     resolved - previously seen by Dr. Judith Del Real (due to enlarged spleen)    Anesthesia     seizures with brain surgery due to blood sugar got really high    Bipolar disorder ([facility] [address]     Blood clot in vein 04/07/2016    Curt Dixon     Cancer [facility]) 04/2019    thyroid    Chest pain     previously seeing Lauren Bowling cardiologist, now seeing Dr. Yamilex Encinas (LAD bridging on cath 4/2016)    Chronic back pain     Chronic bronchitis ([facility] [address]     Dr. Earlene Winter Colon polyp 08/30/2016    Depression     seeing Max Ellis DVT (deep venous thrombosis) ([facility] [address] [id number]    not on Coumadin due to unable to regulate - Dr. Tatum Baig - no etiology found per patient    Esophageal abnormality     nodule     Fatty liver disease, nonalcoholic     U/S 32/7865 [facility]    Furuncle     legs    History of Doppler ultrasound 05/29/2011    No hemodynamically significant carotid stenosis is identified. A thyroid nodule on each side. Dedicated ultrasound of thyroid gland is suggested to further evaluate if clinically indicated.  History of pulmonary embolism 2007    s/p GFF, related to knee surgery    Hx of blood clots     leg and lung    Hyperlipidemia     severely elevated triglycerides    Hypertension     diastolic    Intracranial arachnoid cyst 11/2012    Dr. Niko Richardson drained, complicated with seizures, DKA    Irritable bowel syndrome     Liver disease     Eb Guerra - elevated LFT - positive smooth muscle antibody, steatosis per liver bx 3/2014 with Dr. Boby Helton (multiple drug resistant organisms) resistance 2012    MRSA (methicillin resistant staph aureus) culture positive     h/o in foot and before brain surgery    Nephrolithiasis     noted on CT abdomen 9/2016, 6/2019    Neuropathy     Pancreatic insufficiency     Positive SANDY (antinuclear antibody)     Dr. Yue Becerril - first visit in 6/2013    Prolonged emergence from general anesthesia     Restless legs syndrome     Seizures ([address]     started with brain surgery    Sinus tachycardia     Holter 3/2013 - seeing Dr. Joy Aguilar fracture [facility])     clips     Sleep apnea     positive sleep apnea per study 10/2020.     Systolic dysfunction     \"systolic bridge\"  EF 35% ECHO 9/2019    Type II or unspecified type diabetes mellitus without mention of complication, not stated as uncontrolled 2007        Past Surgical History     Past Surgical History:   Procedure Laterality Date    CARDIAC CATHETERIZATION      2011,5-6 years ago   [address]  3-2012   [address]  04/28/2016    [address] [place] [address]     CARPAL TUNNEL RELEASE  01/2017    CHOLECYSTECTOMY  2004    COLONOSCOPY  2012  COLONOSCOPY  08/2016    2 tubular adenomas - reportedly needs repeat scope in 6 months    CRANIOTOMY  11/2012    arachnoid cyst drainage    EKG 12-LEAD  10/18/2015         ENDOSCOPY, COLON, DIAGNOSTIC      HERNIA REPAIR Right 1996    [facility]--Dr. Ami Murphy INGUINAL HERNIA REPAIR Right 09/15/2016    Robotic assisted    KNEE ARTHROSCOPY Right 2016    KNEE SURGERY Left 2007    acl and debrided twice    OTHER SURGICAL HISTORY  5-31-11    Tilt table was associated w/ nonspecific symptoms or nausea, otherwise no significant dizziness or syncope. No significant hemodynamic changes. Otherwise, unremarkable tilt table test after 30 minutes of tilting.  OTHER SURGICAL HISTORY  01/20/2017    RIGHT SHOULDER ARTHROSCOPY, OPEN STAN, OPEN ACROMIOPLASTY, BICEP TENDESIS, RIGHT CARPAL TUNNEL RELEASE    SHOULDER SURGERY Right 01/2007    TONSILLECTOMY      TRANSTHORACIC ECHOCARDIOGRAM  3-05-11    LV size and systolic function normal. EF 55-65%.  UPPER GASTROINTESTINAL ENDOSCOPY  2012    UPPER GASTROINTESTINAL ENDOSCOPY  2016    Dr. Samantha Aviles Left 10/22/2019    EGD DILATION SAVORY performed by Usman Caro MD at [address] [facility] ENDOSCOPY Left 10/22/2019    EGD BIOPSY performed by Usman Caro MD at [address]  2007        Medications Prior to Admission     Prior to Admission medications    Medication Sig Start Date End Date Taking? Authorizing Provider   insulin glargine (LANTUS SOLOSTAR) 100 UNIT/ML injection pen Inject 75 Units into the skin 2 times daily   Yes Historical Provider, MD   insulin lispro, 1 Unit Dial, (HUMALOG KWIKPEN) 100 UNIT/ML SOPN Inject 30 Units into the skin 3 times daily Plus sliding scale 2   Yes Historical Provider, MD   gabapentin (NEURONTIN) 600 MG tablet Take 600 mg by mouth 4 times daily.    Yes Historical Provider, MD   rOPINIRole (REQUIP) 1 MG tablet TAKE 1 TABLET BY MOUTH THREE TIMES A DAY 12/22/20  Yes Reddy Shell MD   Cyanocobalamin (VITAMIN B-12) 2500 MCG SUBL Place 1 tablet under the tongue daily 8/17/20  Yes Reddy Shell MD   fenofibrate (TRICOR) 145 MG tablet Take 1 tablet by mouth daily 8/17/20  Yes Reddy Shell MD   atorvastatin (LIPITOR) 10 MG tablet TAKE 1 TABLET BY MOUTH ONE TIME A DAY 8/17/20  Yes Reddy Shell MD   vitamin D (ERGOCALCIFEROL) 1.25 MG (98314 UT) CAPS capsule Take 1 capsule by mouth once a week 8/17/20  Yes Reddy Shell MD   metoprolol tartrate (LOPRESSOR) 50 MG tablet Take 1.5 tablets by mouth 2 times daily 8/17/20  Yes Reddy Shell MD   empagliflozin (JARDIANCE) 25 MG tablet Take 25 mg by mouth daily 9/18/19  Yes Reddy Shell MD   pantoprazole (PROTONIX) 40 MG tablet Take 1 tablet by mouth 2 times daily 8/12/19  Yes Reddy Shell MD   DULoxetine (CYMBALTA) 60 MG extended release capsule Take by mouth daily 2 tab   Yes Historical Provider, MD   lacosamide (VIMPAT) 100 MG TABS tablet Take by mouth 2 times daily.  3 tab   Yes Historical Provider, MD   divalproex (DEPAKOTE) 500 MG DR tablet Take 500 mg by mouth 2 times daily  4/5/18  Yes Historical Provider, MD   levETIRAcetam (KEPPRA) 500 MG tablet Take by mouth 2 times daily 4 tab   Yes Historical Provider, MD   zonisamide (ZONEGRAN) 100 MG capsule Take by mouth nightly 4 tab   Yes Historical Provider, MD   promethazine (PHENERGAN) 12.5 MG tablet Take 1-2 tablets by mouth every 8 hours as needed for Nausea 9/5/19   Reddy Shell MD   Melatonin 10 MG TABS Take by mouth nightly 3 tab    Historical Provider, MD   sildenafil (VIAGRA) 100 MG tablet Take 1 tablet by mouth as needed for Erectile Dysfunction 1/29/19   Reddy Shell MD   LORazepam (ATIVAN) 0.5 MG tablet Take 0.5 mg by mouth 2 times daily as needed (seizures)     Historical Provider, MD        Allergies   Ciprofloxacin-ciproflox hcl er, Heparin, Metformin, Niacin and related, Tramadol hcl, Ultram [tramadol], and Warfarin    Social History     Social History     Tobacco History     Smoking Status  Never Smoker    Smokeless Tobacco Use  Never Used          Alcohol History     Alcohol Use Status  No          Drug Use     Drug Use Status  No          Sexual Activity     Sexually Active  Not Asked                Family History     Family History   Problem Relation Age of Onset    Heart Disease Father 61        MI    Stroke Brother     Obesity Brother     Arthritis Mother     Seizures Mother     Obesity Sister     Other Brother         pulmonary embolism    Diabetes Daughter     Seizures Brother        Review of Systems   Review of Systems  Noncontributory  Physical Exam   BP (!) 148/87   Pulse 83   Temp 97.6 °F (36.4 °C) (Temporal)   Resp 16   Ht 6' 2\" (1.88 m)   Wt 252 lb 12.8 oz (114.7 kg)   SpO2 96%   BMI 32.46 kg/m²     Physical Exam  See attached office note  Labs      Recent Results (from the past 24 hour(s))   POCT Glucose    Collection Time: 01/15/21  9:02 AM   Result Value Ref Range    POC Glucose 190 (H) 70 - 108 mg/dl        Imaging/Diagnostics Last 24 Hours   No results found. Assessment    1. Left hemithyroid follicular neoplasm  2. Obstructive sleep apnea refractory to CPAP    Plan   1.  To the operating room for a uvulopalatopharyngoplasty and left hemithyroidectomy    Consultations Ordered:  None    Electronically signed by Ling Harris MD on 1/15/21 at 9:27 AM KAYKAY Harrsi MD   Physician   Specialty:  Otolaryngology        Progress Notes   Signed        Encounter Date:  1/6/2021                               Signed                Expand AllCollapse All                      Expand widget buttonCollapse widget button        Show:Clear all      ManualTemplateCopied    Added by:          Ling Harris MD      [facility] for detailscustomization button                                                                                                                                                                                                                                 untitled image    [address], NOSE AND THROAT    [place] [address] [facility] [address] [id number]    Dept: [phone]    Dept Fax: [phone]    Loc: [phone]         Nehemias Hernandez is a 48 y.o. male who was referred by No ref. provider found for:           Chief Complaint       Patient presents with            Pre-op Exam                   pt here for pre op exam thyroid lobectomy                    HPI:            Nehemias Hernandez is a 48 y.o. male                       History:                     Allergies       Allergen     Reactions            Ciprofloxacin-Ciproflox Hcl Er     Other (See Comments)                   Elevated creatinine            Heparin                         Monitor for thrombocytopenia            Metformin     Nausea Only                   Abdominal pain, diarrhea            Niacin And Related     Other (See Comments)                   Burning sensation, severe flushing            Tramadol Hcl                         Contraindication to seizure medications            Ultram [Tramadol]                         Contraindication to seizure medications            Warfarin                         Very difficult to regulate in past            Current Facility-Administered Medications Past Medical History                                                                                                                                                                                                                                                                                                                                                                                                                                                                                                                                                                                                                                                                                                                                                                                                                                                                                                                                                     Past Surgical History CO2     26     11/15/2020             CO2     24     09/01/2020             BUN     9     12/29/2020             BUN     8     11/15/2020             BUN     9     09/01/2020             CREATININE     0.5     12/29/2020             CREATININE     0.5     12/29/2020             CREATININE     0.4     11/15/2020             CALCIUM     10.0     12/29/2020             CALCIUM     9.6     11/15/2020             CALCIUM     9.4     11/05/2020             PROT     7.1     12/29/2020             PROT     7.4     11/15/2020             PROT     6.5     06/26/2020             LABALBU     4.6     12/29/2020             LABALBU     4.1     11/15/2020             LABALBU     4.3     06/26/2020             LABALBU     4.3     03/23/2012             LABALBU     3.7     01/06/2012             LABALBU     4.1     01/05/2012             BILITOT     1.3     12/29/2020             BILITOT     1.1     11/15/2020             BILITOT     2.0     06/26/2020             ALKPHOS     104     12/29/2020             ALKPHOS     82     11/15/2020             ALKPHOS     84     06/26/2020             AST     14     12/29/2020             AST     21     11/15/2020             AST     15     06/26/2020             ALT     24     12/29/2020             ALT     24     11/15/2020             ALT     21     06/26/2020                 All of the past medical history, past surgical history, family history,social history, allergies and current medications were reviewed with the patient. Assessment & Plan       Diagnoses and all orders for this visit:                 Diagnosis     Orders       1. Nasal obstruction              2.     Obstructive sleep apnea              3.     Follicular neoplasm of thyroid                        Based on the patient's history and physical findings, the patient has significant obstructive sleep apnea and cannot tolerate CPAP.   He is therefore a candidate for a uvulopalatopharyngoplasty as the for surgical effort at enabling him to breathe more effectively and with less resistance. I reviewed with him the indications benefits limitations and risks of proceeding in this manner as well as the possibility that he would need adjunctive surgery is to follow this if it is insufficient to enable him to breathe without obstruction by itself. The risks I described include the potential for bleeding and infection as well as velopharyngeal insufficiency and nasal regurgitation of liquids, a problem, if it occurs, that tends to be self-limited. Continued obstructive sleep apnea is another risk and the need for additional surgery as described above. It is possible he will need the lingual tonsils removed or other forms of pharyngoplasty and he may need nasal surgery to improve his nasal breathing again if these other measures are insufficient. Regarding his thyroid disease, the patient recently had an ultrasound of the thyroid where he was described as having to low risk lesions of the thyroid. This was out of context to the 2 fine-needle aspirations which demonstrated follicular cells of indeterminate risk for malignancy. Those 2 fine-needle aspirations were done in 2019 and on the basis of the patient's seeking surgical treatment for that side of his thyroid to allow a definitive diagnosis of cancer or the exclusion of that diagnosis once and for all. He understands that if he is found to have a follicular cancer or some other malignancy of the thyroid, he may have to undergo a contralateral lobe removal sometime in the relatively near future after the first procedure. The risks of a unilateral lobectomy include bleeding, infection, unilateral vocal fold paralysis, and the potential that 1 or both parathyroid glands of that side may be dysfunctional following the procedure.   This relates to the risks of the second side of the gland being removed which could result in both vocal folds having movement pathology and problems with calcium homeostasis requiring a prolonged if not permanent need for high-dose calcium supplementation among other agents such as vitamin D to help regulate his calcium. The patient reported understanding these and other risks both about his thyroid gland and about the UPPP procedure for sleep apnea and wished to proceed. He has scheduled for the near future. I spent over 30 minutes in face-to-face time with the patient reviewing his diagnoses and the treatment plan related to each. Return in about 4 weeks (around 2/3/2021) for 1 week postop for drain removal.                       **This report has been created using voice recognition software. It may contain minor errors which are inherent in voice recognition technology. **                                                                              Office Visit on 1/6/2021                            Detailed Report                          Note shared with patient                  Progress Notes Info        Author    Note Status    Last Update User      Meghan De Los Santos MD Signed Meghan De Los Santos MD   Last Update Date/Time: 1/6/2021 12:24 PM             Chart Review Routing History      No routing history on file.

## 2021-01-15 NOTE — PROGRESS NOTES
99 [id number] Arouses to name on arrival to PACU , O2 5L NC applied HOB elevated   1350 awake and oriented , c/o # 10 throat pain , pt able to say letter E and swallow with no problems  1355 medicated with Dilaudid 0.5 mg IV Accucheck 154 Dr Imm aware no orders given   1400 no change in pain medicated with dilaudid 0.5 mg IV   1405 pain unchanged medicated with fentanyl 50 mcg IV   1410 pain unchanged medicated with Fentanyl 50 mcg and dilaudid 0.5 mg IV   1415 pain states no change to pain medicated with dilaudid 0.5 mg IV , triple antibiotic applied around CHRIS insertion site  1420 pt resting resp easy   1425 pt has periods of apnea needing verbal prompting to take deep breathes  1440 pt no longer needing prompting and wakes up on own and looks around , states pain a # 8 - 9 , ice pack applied to neck and pt given icechips per Dr Angela Ocasio direction  , O2 down to 3L NC  1455 pt states pain a # 8   1510 Pt awake and talking with staff , states pain a # 8   1512 meets criteria for discharge , transported to [facility]

## 2021-01-15 NOTE — ANESTHESIA POSTPROCEDURE EVALUATION
Department of Anesthesiology  Postprocedure Note    Patient: Nellene Romberg  MRN: [id number]  YOB: 1970  Date of evaluation: 1/15/2021  Time:  3:15 PM     Procedure Summary     Date: 01/15/21 Room / Location: [place] [address] / [facility]    Anesthesia Start: 0948 Anesthesia Stop: 9971    Procedure: THYROID LOBECTOMY, UVULOPALATOPHARYNGOPLAST LAOP (N/A ) Diagnosis: (FOLLICULAR NEOPLASM OF THYROID OBSTRUCTIVE SLEEP APNEA, SNORING, OBESITY)    Surgeons: Negar Tucker MD Responsible Provider: Saundra Lai MD    Anesthesia Type: general ASA Status: 3          Anesthesia Type: general    Levi Phase I: Levi Score: 9    Levi Phase II:      Last vitals: Reviewed and per EMR flowsheets.        Anesthesia Post Evaluation    Patient location during evaluation: PACU  Patient participation: complete - patient participated  Level of consciousness: awake and alert  Airway patency: patent  Nausea & Vomiting: no nausea  Complications: no  Cardiovascular status: blood pressure returned to baseline and hemodynamically stable  Respiratory status: acceptable and spontaneous ventilation  Hydration status: euvolemic

## 2021-01-16 PROBLEM — G47.33 OSA (OBSTRUCTIVE SLEEP APNEA): Status: ACTIVE | Noted: 2021-01-16

## 2021-01-16 LAB
ALBUMIN SERPL-MCNC: 3.6 G/DL (ref 3.5–5.1)
ALP BLD-CCNC: 50 U/L (ref 38–126)
ALT SERPL-CCNC: 21 U/L (ref 11–66)
ANION GAP SERPL CALCULATED.3IONS-SCNC: 10 MEQ/L (ref 8–16)
AST SERPL-CCNC: 15 U/L (ref 5–40)
BILIRUB SERPL-MCNC: 1.4 MG/DL (ref 0.3–1.2)
BUN BLDV-MCNC: 8 MG/DL (ref 7–22)
CALCIUM SERPL-MCNC: 8 MG/DL (ref 8.5–10.5)
CHLORIDE BLD-SCNC: 106 MEQ/L (ref 98–111)
CO2: 25 MEQ/L (ref 23–33)
CREAT SERPL-MCNC: 0.6 MG/DL (ref 0.4–1.2)
ERYTHROCYTE [DISTWIDTH] IN BLOOD BY AUTOMATED COUNT: 13.4 % (ref 11.5–14.5)
ERYTHROCYTE [DISTWIDTH] IN BLOOD BY AUTOMATED COUNT: 43.7 FL (ref 35–45)
GFR SERPL CREATININE-BSD FRML MDRD: > 90 ML/MIN/1.73M2
GLUCOSE BLD-MCNC: 193 MG/DL (ref 70–108)
GLUCOSE BLD-MCNC: 212 MG/DL (ref 70–108)
GLUCOSE BLD-MCNC: 237 MG/DL (ref 70–108)
GLUCOSE BLD-MCNC: 258 MG/DL (ref 70–108)
GLUCOSE BLD-MCNC: 262 MG/DL (ref 70–108)
HCT VFR BLD CALC: 38.8 % (ref 42–52)
HEMOGLOBIN: 13.3 GM/DL (ref 14–18)
MCH RBC QN AUTO: 30.9 PG (ref 26–33)
MCHC RBC AUTO-ENTMCNC: 34.3 GM/DL (ref 32.2–35.5)
MCV RBC AUTO: 90.2 FL (ref 80–94)
PLATELET # BLD: 159 THOU/MM3 (ref 130–400)
PMV BLD AUTO: 10 FL (ref 9.4–12.4)
POTASSIUM REFLEX MAGNESIUM: 3.7 MEQ/L (ref 3.5–5.2)
RBC # BLD: 4.3 MILL/MM3 (ref 4.7–6.1)
SODIUM BLD-SCNC: 141 MEQ/L (ref 135–145)
TOTAL PROTEIN: 5.9 G/DL (ref 6.1–8)
WBC # BLD: 7.3 THOU/MM3 (ref 4.8–10.8)

## 2021-01-16 PROCEDURE — 2500000003 HC RX 250 WO HCPCS: Performed by: INTERNAL MEDICINE

## 2021-01-16 PROCEDURE — C9254 INJECTION, LACOSAMIDE: HCPCS | Performed by: INTERNAL MEDICINE

## 2021-01-16 PROCEDURE — 2580000003 HC RX 258: Performed by: OTOLARYNGOLOGY

## 2021-01-16 PROCEDURE — 2580000003 HC RX 258: Performed by: INTERNAL MEDICINE

## 2021-01-16 PROCEDURE — 6360000002 HC RX W HCPCS: Performed by: PHYSICIAN ASSISTANT

## 2021-01-16 PROCEDURE — 80053 COMPREHEN METABOLIC PANEL: CPT

## 2021-01-16 PROCEDURE — 82948 REAGENT STRIP/BLOOD GLUCOSE: CPT

## 2021-01-16 PROCEDURE — 6360000002 HC RX W HCPCS: Performed by: INTERNAL MEDICINE

## 2021-01-16 PROCEDURE — 6370000000 HC RX 637 (ALT 250 FOR IP): Performed by: OTOLARYNGOLOGY

## 2021-01-16 PROCEDURE — 6370000000 HC RX 637 (ALT 250 FOR IP): Performed by: INTERNAL MEDICINE

## 2021-01-16 PROCEDURE — 2500000003 HC RX 250 WO HCPCS: Performed by: PHYSICIAN ASSISTANT

## 2021-01-16 PROCEDURE — 2580000003 HC RX 258: Performed by: PHYSICIAN ASSISTANT

## 2021-01-16 PROCEDURE — 36415 COLL VENOUS BLD VENIPUNCTURE: CPT

## 2021-01-16 PROCEDURE — 6370000000 HC RX 637 (ALT 250 FOR IP): Performed by: PHYSICIAN ASSISTANT

## 2021-01-16 PROCEDURE — 2060000000 HC ICU INTERMEDIATE R&B

## 2021-01-16 PROCEDURE — 99233 SBSQ HOSP IP/OBS HIGH 50: CPT | Performed by: INTERNAL MEDICINE

## 2021-01-16 PROCEDURE — 94761 N-INVAS EAR/PLS OXIMETRY MLT: CPT

## 2021-01-16 PROCEDURE — 85027 COMPLETE CBC AUTOMATED: CPT

## 2021-01-16 RX ORDER — INSULIN GLARGINE 100 [IU]/ML
50 INJECTION, SOLUTION SUBCUTANEOUS 2 TIMES DAILY
Status: DISCONTINUED | OUTPATIENT
Start: 2021-01-16 | End: 2021-01-19

## 2021-01-16 RX ORDER — METOPROLOL TARTRATE 5 MG/5ML
5 INJECTION INTRAVENOUS EVERY 4 HOURS
Status: DISCONTINUED | OUTPATIENT
Start: 2021-01-16 | End: 2021-01-19

## 2021-01-16 RX ORDER — METOPROLOL TARTRATE 5 MG/5ML
5 INJECTION INTRAVENOUS EVERY 4 HOURS
Status: COMPLETED | OUTPATIENT
Start: 2021-01-16 | End: 2021-01-16

## 2021-01-16 RX ADMIN — HYDROMORPHONE HYDROCHLORIDE 0.5 MG: 1 INJECTION, SOLUTION INTRAMUSCULAR; INTRAVENOUS; SUBCUTANEOUS at 22:56

## 2021-01-16 RX ADMIN — OXYCODONE HYDROCHLORIDE AND ACETAMINOPHEN 2 TABLET: 5; 325 TABLET ORAL at 01:21

## 2021-01-16 RX ADMIN — METOPROLOL TARTRATE 5 MG: 5 INJECTION, SOLUTION INTRAVENOUS at 23:07

## 2021-01-16 RX ADMIN — LEVETIRACETAM 2000 MG: 100 INJECTION, SOLUTION INTRAVENOUS at 23:57

## 2021-01-16 RX ADMIN — HYDROMORPHONE HYDROCHLORIDE 0.5 MG: 1 INJECTION, SOLUTION INTRAMUSCULAR; INTRAVENOUS; SUBCUTANEOUS at 20:02

## 2021-01-16 RX ADMIN — SODIUM CHLORIDE: 9 INJECTION, SOLUTION INTRAVENOUS at 20:29

## 2021-01-16 RX ADMIN — DEXTROSE MONOHYDRATE 300 MG: 50 INJECTION, SOLUTION INTRAVENOUS at 12:05

## 2021-01-16 RX ADMIN — BACITRACIN ZINC NEOMYCIN SULFATE POLYMYXIN B SULFATE: 400; 3.5; 5 OINTMENT TOPICAL at 22:58

## 2021-01-16 RX ADMIN — INSULIN GLARGINE 50 UNITS: 100 INJECTION, SOLUTION SUBCUTANEOUS at 20:25

## 2021-01-16 RX ADMIN — METOPROLOL TARTRATE 5 MG: 5 INJECTION INTRAVENOUS at 11:55

## 2021-01-16 RX ADMIN — BACITRACIN ZINC NEOMYCIN SULFATE POLYMYXIN B SULFATE: 400; 3.5; 5 OINTMENT TOPICAL at 15:21

## 2021-01-16 RX ADMIN — HYDROMORPHONE HYDROCHLORIDE 0.5 MG: 1 INJECTION, SOLUTION INTRAMUSCULAR; INTRAVENOUS; SUBCUTANEOUS at 08:30

## 2021-01-16 RX ADMIN — BACITRACIN ZINC NEOMYCIN SULFATE POLYMYXIN B SULFATE: 400; 3.5; 5 OINTMENT TOPICAL at 05:12

## 2021-01-16 RX ADMIN — Medication 5 ML: at 15:19

## 2021-01-16 RX ADMIN — CEFAZOLIN SODIUM 1 G: 1 INJECTION, SOLUTION INTRAVENOUS at 04:06

## 2021-01-16 RX ADMIN — HYDROMORPHONE HYDROCHLORIDE 0.5 MG: 1 INJECTION, SOLUTION INTRAMUSCULAR; INTRAVENOUS; SUBCUTANEOUS at 04:04

## 2021-01-16 RX ADMIN — HYDROMORPHONE HYDROCHLORIDE 0.5 MG: 1 INJECTION, SOLUTION INTRAMUSCULAR; INTRAVENOUS; SUBCUTANEOUS at 15:20

## 2021-01-16 RX ADMIN — VALPROATE SODIUM 250 MG: 100 INJECTION, SOLUTION INTRAVENOUS at 22:58

## 2021-01-16 RX ADMIN — METOPROLOL TARTRATE 5 MG: 5 INJECTION INTRAVENOUS at 15:19

## 2021-01-16 RX ADMIN — LEVETIRACETAM 2000 MG: 100 INJECTION, SOLUTION INTRAVENOUS at 11:07

## 2021-01-16 ASSESSMENT — PAIN SCALES - GENERAL
PAINLEVEL_OUTOF10: 8
PAINLEVEL_OUTOF10: 9

## 2021-01-16 ASSESSMENT — PAIN DESCRIPTION - FREQUENCY: FREQUENCY: CONTINUOUS

## 2021-01-16 ASSESSMENT — PAIN DESCRIPTION - ONSET: ONSET: ON-GOING

## 2021-01-16 ASSESSMENT — PAIN DESCRIPTION - PAIN TYPE: TYPE: ACUTE PAIN

## 2021-01-16 ASSESSMENT — PAIN DESCRIPTION - DESCRIPTORS: DESCRIPTORS: ACHING

## 2021-01-16 ASSESSMENT — PAIN DESCRIPTION - PROGRESSION: CLINICAL_PROGRESSION: GRADUALLY IMPROVING

## 2021-01-16 NOTE — FLOWSHEET NOTE
01/16/21 1211   Encounter Summary   Services provided to: Patient   Referral/Consult From: Rounding   Continue Visiting Yes  (1/16)   Complexity of Encounter Moderate   Length of Encounter 15 minutes   Routine   Type Initial   Assessment Calm; Approachable   Intervention Nurtured hope   Outcome Comfort;Expressed gratitude   Spiritual/Protestant   Type Spiritual support   Assessment: In my encounter with the 48 yr old patient, while rounding  the unit 4K,  I provided spiritual care to patient through conversation, I also came to assess the patients spiritual needs present. The pt appeared tired and was admitted due to thyroid problems. Interventions:  I provided prayer, emotional support and words of comfort.  provided a listening presence and encouraged pt to share their beliefs and how they support him during their hospitalization. Outcomes: The patient was encouraged and didnt share any further spiritual needs at this time. The pt remains optimistic and hopeful. The pt shared that they were appreciative for the support. Plan:  1. Chaplains will follow-up at a later time for assessment of any spiritual care needs present.   The Chaplains will be available to provide further emotional support per request.

## 2021-01-16 NOTE — PROGRESS NOTES
Meryle Speak is a 48 y.o. male patient. No diagnosis found. Past Medical History:   Diagnosis Date    Abnormal thyroid biopsy     Acid reflux     Anemia     resolved - previously seen by Dr. Jer Byrd (due to enlarged spleen)    Anesthesia     seizures with brain surgery due to blood sugar got really high    Bipolar disorder ([facility] [address]     Blood clot in vein 04/07/2016    Tyree Oliva     Cancer [facility]) 04/2019    thyroid    Chest pain     previously seeing [facility] cardiologist, now seeing Dr. Peg Sarmiento (LAD bridging on cath 4/2016)    Chronic back pain     Chronic bronchitis ([facility] [address]     Dr. Michael Kingston Colon polyp 08/30/2016    Depression     seeing Jennifer Valenuzela DVT (deep venous thrombosis) ([facility] [address] [id number]    not on Coumadin due to unable to regulate - Dr. Karina Salomon - no etiology found per patient    Esophageal abnormality     nodule     Fatty liver disease, nonalcoholic     [facility] [address] [facility]    Furuncle     legs    History of Doppler ultrasound 05/29/2011    No hemodynamically significant carotid stenosis is identified. A thyroid nodule on each side. Dedicated ultrasound of thyroid gland is suggested to further evaluate if clinically indicated.      History of pulmonary embolism 2007    s/p GFF, related to knee surgery    Hx of blood clots     leg and lung    Hyperlipidemia     severely elevated triglycerides    Hypertension     diastolic    Intracranial arachnoid cyst 11/2012    Dr. iNko Richardson drained, complicated with seizures, DKA    Irritable bowel syndrome     Liver disease     Eb Guerra - elevated LFT - positive smooth muscle antibody, steatosis per liver bx 3/2014 with Dr. Boby Helton (multiple drug resistant organisms) resistance 2012    MRSA (methicillin resistant staph aureus) culture positive     h/o in foot and before brain surgery    Nephrolithiasis     noted on CT abdomen 9/2016, 6/2019    Neuropathy     Pancreatic insufficiency     Positive SANDY (antinuclear antibody)     Dr. Yue Becerril - first visit in 6/2013    Prolonged emergence from general anesthesia     Restless legs syndrome     Seizures ([address]     started with brain surgery    Sinus tachycardia     Holter 3/2013 - seeing Dr. Ortega Wakefield Skull fracture [facility])     clips     Sleep apnea     positive sleep apnea per study 10/2020.  Systolic dysfunction     \"systolic bridge\"  EF 87% ECHO 9/2019    Type II or unspecified type diabetes mellitus without mention of complication, not stated as uncontrolled 2007     No past surgical history pertinent negatives on file.   Scheduled Meds:   insulin glargine  50 Units Subcutaneous BID    insulin lispro  0-18 Units Subcutaneous TID WC    insulin lispro  0-9 Units Subcutaneous Nightly    metoprolol  5 mg Intravenous Q4H    neomycin-bacitracin-polymyxin   Topical 3 times per day    atorvastatin  10 mg Oral Daily    vitamin B-12  2,500 mcg Oral Daily    divalproex  500 mg Oral BID    DULoxetine  120 mg Oral Daily    empagliflozin  25 mg Oral Daily    fenofibrate  160 mg Oral Daily    [Held by provider] insulin lispro  30 Units Subcutaneous TID AC    [Held by provider] levETIRAcetam  2,000 mg Oral BID    metoprolol tartrate  75 mg Oral BID    pantoprazole  40 mg Oral BID    rOPINIRole  1 mg Oral TID    zonisamide  400 mg Oral Nightly    lacosamide  300 mg Oral BID    levetiracetam  2,000 mg Intravenous Q12H     Continuous Infusions:   sodium chloride 100 mL/hr at 01/15/21 1628    dextrose       PRN Meds:magic (miracle) mouthwash, LORazepam, glucose, dextrose, glucagon (rDNA), dextrose, labetalol, promethazine, HYDROmorphone **OR** HYDROmorphone, HYDROcodone-acetaminophen **OR** oxyCODONE-acetaminophen **OR** oxyCODONE-acetaminophen, ondansetron    Allergies   Allergen Reactions    Ciprofloxacin-Ciproflox Hcl Er Other (See Comments)     Elevated creatinine    Heparin      Monitor for thrombocytopenia    Metformin Nausea Only     Abdominal pain, diarrhea    Niacin And Related Other (See Comments)     Burning sensation, severe flushing    Tramadol Hcl      Contraindication to seizure medications    Ultram [Tramadol]      Contraindication to seizure medications    Warfarin      Very difficult to regulate in past     Active Problems:    Follicular neoplasm of thyroid  Resolved Problems:    * No resolved hospital problems. *    Blood pressure (!) 146/79, pulse 85, temperature 99.2 °F (37.3 °C), temperature source Oral, resp. rate 16, height 6' 2\" (1.88 m), weight 252 lb 12.8 oz (114.7 kg), SpO2 96 %. Subjective:   Diet: Poor intake (Complaining of odynophagia). Activity level: Impaired due to pain. Pain control: Partially controlled. Wound: Painful. Objective:  Vital signs (most recent): Blood pressure (!) 146/79, pulse 85, temperature 99.2 °F (37.3 °C), temperature source Oral, resp. rate 16, height 6' 2\" (1.88 m), weight 252 lb 12.8 oz (114.7 kg), SpO2 96 %. General appearance: Uncomfortable. Wound:  Clean (Drain holding vacuum). There is serosanguinous drainage. Oropharynx: Palatal suture line and pharyngeal suture lines intact; no signs of recent bleeding  Voice: Neither hyper nor hyponasal  Respiration: Laminar breath sounds; effortless breathing    Assessment:   Post-op: 1 day. Condition: In stable condition. Plan:  Encourage ambulation. Continue wound care as written. Start/continue incentive spirometry. General Orders/Medications Plan: Encourage p.o. intake despite odynophagia  ; Will give him lidocaine Benadryl Maalox to gargle with head of oral efforts to anesthetize his pharynx to some degree and promote p.o. nutrition. Appreciate medical consult service specifically with regards to his seizure disorder. Bebe Lynch.  Maisha Monaco MD  Cell: [phone]    Manjula Gaytan MD  1/16/2021

## 2021-01-17 LAB
ALBUMIN SERPL-MCNC: 3.5 G/DL (ref 3.5–5.1)
ALP BLD-CCNC: 57 U/L (ref 38–126)
ALT SERPL-CCNC: 22 U/L (ref 11–66)
ANION GAP SERPL CALCULATED.3IONS-SCNC: 10 MEQ/L (ref 8–16)
AST SERPL-CCNC: 20 U/L (ref 5–40)
BILIRUB SERPL-MCNC: 1.8 MG/DL (ref 0.3–1.2)
BUN BLDV-MCNC: 6 MG/DL (ref 7–22)
CALCIUM SERPL-MCNC: 8.3 MG/DL (ref 8.5–10.5)
CHLORIDE BLD-SCNC: 107 MEQ/L (ref 98–111)
CO2: 26 MEQ/L (ref 23–33)
CREAT SERPL-MCNC: 0.4 MG/DL (ref 0.4–1.2)
ERYTHROCYTE [DISTWIDTH] IN BLOOD BY AUTOMATED COUNT: 12.9 % (ref 11.5–14.5)
ERYTHROCYTE [DISTWIDTH] IN BLOOD BY AUTOMATED COUNT: 41.9 FL (ref 35–45)
GFR SERPL CREATININE-BSD FRML MDRD: > 90 ML/MIN/1.73M2
GLUCOSE BLD-MCNC: 151 MG/DL (ref 70–108)
GLUCOSE BLD-MCNC: 162 MG/DL (ref 70–108)
GLUCOSE BLD-MCNC: 210 MG/DL (ref 70–108)
GLUCOSE BLD-MCNC: 222 MG/DL (ref 70–108)
GLUCOSE BLD-MCNC: 235 MG/DL (ref 70–108)
HCT VFR BLD CALC: 39.1 % (ref 42–52)
HEMOGLOBIN: 13.7 GM/DL (ref 14–18)
MCH RBC QN AUTO: 31.4 PG (ref 26–33)
MCHC RBC AUTO-ENTMCNC: 35 GM/DL (ref 32.2–35.5)
MCV RBC AUTO: 89.7 FL (ref 80–94)
MRSA SCREEN: NORMAL
PLATELET # BLD: 158 THOU/MM3 (ref 130–400)
PMV BLD AUTO: 10 FL (ref 9.4–12.4)
POTASSIUM REFLEX MAGNESIUM: 3.9 MEQ/L (ref 3.5–5.2)
RBC # BLD: 4.36 MILL/MM3 (ref 4.7–6.1)
SODIUM BLD-SCNC: 143 MEQ/L (ref 135–145)
TOTAL PROTEIN: 6 G/DL (ref 6.1–8)
WBC # BLD: 6.3 THOU/MM3 (ref 4.8–10.8)

## 2021-01-17 PROCEDURE — 6370000000 HC RX 637 (ALT 250 FOR IP): Performed by: INTERNAL MEDICINE

## 2021-01-17 PROCEDURE — 80053 COMPREHEN METABOLIC PANEL: CPT

## 2021-01-17 PROCEDURE — 99232 SBSQ HOSP IP/OBS MODERATE 35: CPT | Performed by: INTERNAL MEDICINE

## 2021-01-17 PROCEDURE — 6360000002 HC RX W HCPCS: Performed by: PHYSICIAN ASSISTANT

## 2021-01-17 PROCEDURE — 2580000003 HC RX 258: Performed by: OTOLARYNGOLOGY

## 2021-01-17 PROCEDURE — C9254 INJECTION, LACOSAMIDE: HCPCS | Performed by: PHYSICIAN ASSISTANT

## 2021-01-17 PROCEDURE — 6370000000 HC RX 637 (ALT 250 FOR IP): Performed by: OTOLARYNGOLOGY

## 2021-01-17 PROCEDURE — 2580000003 HC RX 258: Performed by: PHYSICIAN ASSISTANT

## 2021-01-17 PROCEDURE — 82948 REAGENT STRIP/BLOOD GLUCOSE: CPT

## 2021-01-17 PROCEDURE — 6370000000 HC RX 637 (ALT 250 FOR IP): Performed by: PHYSICIAN ASSISTANT

## 2021-01-17 PROCEDURE — 36415 COLL VENOUS BLD VENIPUNCTURE: CPT

## 2021-01-17 PROCEDURE — 2500000003 HC RX 250 WO HCPCS: Performed by: PHYSICIAN ASSISTANT

## 2021-01-17 PROCEDURE — 85027 COMPLETE CBC AUTOMATED: CPT

## 2021-01-17 PROCEDURE — 2060000000 HC ICU INTERMEDIATE R&B

## 2021-01-17 RX ADMIN — OXYCODONE HYDROCHLORIDE AND ACETAMINOPHEN 1 TABLET: 5; 325 TABLET ORAL at 20:57

## 2021-01-17 RX ADMIN — METOPROLOL TARTRATE 5 MG: 5 INJECTION, SOLUTION INTRAVENOUS at 12:28

## 2021-01-17 RX ADMIN — DEXTROSE MONOHYDRATE 300 MG: 50 INJECTION, SOLUTION INTRAVENOUS at 00:21

## 2021-01-17 RX ADMIN — METOPROLOL TARTRATE 5 MG: 5 INJECTION, SOLUTION INTRAVENOUS at 05:07

## 2021-01-17 RX ADMIN — METOPROLOL TARTRATE 5 MG: 5 INJECTION, SOLUTION INTRAVENOUS at 23:26

## 2021-01-17 RX ADMIN — Medication 5 ML: at 21:12

## 2021-01-17 RX ADMIN — BACITRACIN ZINC NEOMYCIN SULFATE POLYMYXIN B SULFATE: 400; 3.5; 5 OINTMENT TOPICAL at 20:59

## 2021-01-17 RX ADMIN — HYDROMORPHONE HYDROCHLORIDE 0.5 MG: 1 INJECTION, SOLUTION INTRAMUSCULAR; INTRAVENOUS; SUBCUTANEOUS at 05:44

## 2021-01-17 RX ADMIN — Medication 5 ML: at 10:29

## 2021-01-17 RX ADMIN — LEVETIRACETAM 2000 MG: 100 INJECTION, SOLUTION INTRAVENOUS at 23:26

## 2021-01-17 RX ADMIN — VALPROATE SODIUM 250 MG: 100 INJECTION, SOLUTION INTRAVENOUS at 05:08

## 2021-01-17 RX ADMIN — BACITRACIN ZINC NEOMYCIN SULFATE POLYMYXIN B SULFATE: 400; 3.5; 5 OINTMENT TOPICAL at 06:30

## 2021-01-17 RX ADMIN — INSULIN GLARGINE 50 UNITS: 100 INJECTION, SOLUTION SUBCUTANEOUS at 21:00

## 2021-01-17 RX ADMIN — DEXTROSE MONOHYDRATE 300 MG: 50 INJECTION, SOLUTION INTRAVENOUS at 20:59

## 2021-01-17 RX ADMIN — LEVETIRACETAM 2000 MG: 100 INJECTION, SOLUTION INTRAVENOUS at 11:48

## 2021-01-17 RX ADMIN — PANTOPRAZOLE SODIUM 40 MG: 40 TABLET, DELAYED RELEASE ORAL at 20:56

## 2021-01-17 RX ADMIN — HYDROMORPHONE HYDROCHLORIDE 0.5 MG: 1 INJECTION, SOLUTION INTRAMUSCULAR; INTRAVENOUS; SUBCUTANEOUS at 02:34

## 2021-01-17 RX ADMIN — METOPROLOL TARTRATE 5 MG: 5 INJECTION, SOLUTION INTRAVENOUS at 08:14

## 2021-01-17 RX ADMIN — INSULIN GLARGINE 50 UNITS: 100 INJECTION, SOLUTION SUBCUTANEOUS at 09:24

## 2021-01-17 RX ADMIN — DEXTROSE MONOHYDRATE 300 MG: 50 INJECTION, SOLUTION INTRAVENOUS at 09:22

## 2021-01-17 RX ADMIN — METOPROLOL TARTRATE 5 MG: 5 INJECTION, SOLUTION INTRAVENOUS at 21:03

## 2021-01-17 RX ADMIN — VALPROATE SODIUM 250 MG: 100 INJECTION, SOLUTION INTRAVENOUS at 17:54

## 2021-01-17 RX ADMIN — ROPINIROLE 1 MG: 1 TABLET, FILM COATED ORAL at 20:58

## 2021-01-17 RX ADMIN — SODIUM CHLORIDE: 9 INJECTION, SOLUTION INTRAVENOUS at 15:55

## 2021-01-17 RX ADMIN — METOPROLOL TARTRATE 5 MG: 5 INJECTION, SOLUTION INTRAVENOUS at 15:55

## 2021-01-17 RX ADMIN — ROPINIROLE 1 MG: 1 TABLET, FILM COATED ORAL at 13:14

## 2021-01-17 RX ADMIN — OXYCODONE HYDROCHLORIDE AND ACETAMINOPHEN 1 TABLET: 5; 325 TABLET ORAL at 14:32

## 2021-01-17 RX ADMIN — BACITRACIN ZINC NEOMYCIN SULFATE POLYMYXIN B SULFATE: 400; 3.5; 5 OINTMENT TOPICAL at 13:15

## 2021-01-17 RX ADMIN — VALPROATE SODIUM 250 MG: 100 INJECTION, SOLUTION INTRAVENOUS at 10:30

## 2021-01-17 ASSESSMENT — PAIN SCALES - GENERAL
PAINLEVEL_OUTOF10: 6
PAINLEVEL_OUTOF10: 8
PAINLEVEL_OUTOF10: 7
PAINLEVEL_OUTOF10: 8
PAINLEVEL_OUTOF10: 6

## 2021-01-17 ASSESSMENT — PAIN DESCRIPTION - ORIENTATION: ORIENTATION: MID

## 2021-01-17 NOTE — PROGRESS NOTES
Hospitalist Progress Note      Patient:  Shannon Perry    Unit/Bed:4K-24/024-A  YOB: 1970  MRN: [id number]   Acct: [de-identified]   PCP: Fide Cha MD  Date of Admission: 1/15/2021    Assessment/Plan:    1. 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 Patient tolerated surgery well. ENT attending/managing. 1/16: pt not taking PO in any significant capacity, encouraged use of viscous lidocaine  2. Seizure disorder: Complex history with dynamic seizure medication dosing per primary. Continue home doses of Depakote, Keppra, zonisamide, and lacosamide. Continue to monitor closely, maintain seizure precautions. 1/16: pt refusing to take PO, discussed the need for this with him, but in the meantime give IV vimpat and keppra - no IV formulation of Zonegran available (pt on depakote for mood, not sz)  3. Type 2 diabetes mellitus: Hemoglobin A1c 9.3 on 12/22/2020. Current blood glucose level at 225. Start Accu-Cheks before meals and at bedtime with sliding scale insulin coverage (low-dose corrective algorithm). Hold Lantus and additional mealtime coverage insulin until patient is able to tolerate more substantial diet; continue Jardiance. Hypoglycemia protocol in place, maintain carb controlled diet when cleared by primary. 1/16: resume lantus at 50 units BID and monitor, continue with just a SSI, not his typical high dose of mealtime coverage for now  4. Essential hypertension: History, not at goal.  Likely secondary to pain response. Continue metoprolol. Labetalol available PRN for SBP >165. 1/16: as above, not taking PO, trying to encourage this  5. Hyperlipidemia: Continue fenofibrate and atorvastatin. 6. GERD: Continue Protonix. 7. Depression, bipolar disorder: Continue Cymbalta. 8. Restless leg syndrome: Continue Requip. 9. Sleep apnea: History, noted.   10. Obesity: BMI 32.46      Expected discharge Oral Daily    vitamin B-12  2,500 mcg Oral Daily    [Held by provider] divalproex  500 mg Oral BID    DULoxetine  120 mg Oral Daily    empagliflozin  25 mg Oral Daily    [Held by provider] fenofibrate  160 mg Oral Daily    [Held by provider] insulin lispro  30 Units Subcutaneous TID AC    [Held by provider] levETIRAcetam  2,000 mg Oral BID    [Held by provider] metoprolol tartrate  75 mg Oral BID    pantoprazole  40 mg Oral BID    rOPINIRole  1 mg Oral TID    zonisamide  400 mg Oral Nightly    [Held by provider] lacosamide  300 mg Oral BID    levetiracetam  2,000 mg Intravenous Q12H     PRN Meds: magic (miracle) mouthwash, LORazepam, glucose, dextrose, glucagon (rDNA), dextrose, labetalol, promethazine, HYDROmorphone **OR** HYDROmorphone, HYDROcodone-acetaminophen **OR** oxyCODONE-acetaminophen **OR** oxyCODONE-acetaminophen, ondansetron      Intake/Output Summary (Last 24 hours) at 1/17/2021 1812  Last data filed at 1/17/2021 1404  Gross per 24 hour   Intake 3572.62 ml   Output 3755 ml   Net -182.38 ml       Diet:  DIET GENERAL; Dysphagia Pureed    Exam:  /70   Pulse 84   Temp 98.7 °F (37.1 °C) (Oral)   Resp 18   Ht 6' 2\" (1.88 m)   Wt 252 lb 12.8 oz (114.7 kg)   SpO2 94%   BMI 32.46 kg/m²   General appearance:  No apparent distress, appears stated age and cooperative. HEENT: Pupils equal, round, and reactive to light. Conjunctivae/corneas clear. Neck: Supple, with full range of motion. No jugular venous distention. Trachea midline with clean incision  Respiratory:  Normal respiratory effort. Clear to auscultation, bilaterally without Rales/Wheezes/Rhonchi. Cardiovascular: Regular rate and rhythm with normal S1/S2 without murmurs, rubs or gallops. Abdomen: Soft, non-tender, non-distended with normal bowel sounds. Musculoskeletal: passive and active ROM x 4 extremities. Skin: Skin color, texture, turgor normal.  No rashes or lesions.   Neurologic:  Neurovascularly intact without any focal sensory/motor deficits. Cranial nerves: II-XII intact, grossly non-focal.  Psychiatric: Alert and oriented, thought content appropriate, normal insight  Capillary Refill: Brisk,< 3 seconds   Peripheral Pulses: +2 palpable, equal bilaterally     Labs:   Recent Labs     01/15/21  1753 01/16/21  0346 01/17/21  0432   WBC 7.6 7.3 6.3   HGB 14.4 13.3* 13.7*   HCT 41.1* 38.8* 39.1*    159 158     Recent Labs     01/15/21  1753 01/16/21  0346 01/17/21  0432    141 143   K 4.0 3.7 3.9    106 107   CO2 25 25 26   BUN 11 8 6*   CREATININE 0.7 0.6 0.4   CALCIUM 8.2* 8.0* 8.3*     Recent Labs     01/15/21  1753 01/16/21  0346 01/17/21  0432   AST 25 15 20   ALT 28 21 22   BILITOT 0.8 1.4* 1.8*   ALKPHOS 54 50 57     No results for input(s): INR in the last 72 hours. No results for input(s): Teryl Drown in the last 72 hours. Microbiology:    Blood culture #1:   Lab Results   Component Value Date    BC No growth-preliminaryNo growth 08/12/2019       Blood culture #2:No results found for: Anita Reg    Organism:  Lab Results   Component Value Date    ORG Staphylococcus capitis 04/12/2016       No results found for: LABGRAM    MRSA culture only:No results found for: [facility]    Urine culture:   Lab Results   Component Value Date    LABURIN No growth-preliminaryNo growth 08/12/2019       Respiratory culture: No results found for: CULTRESP    Aerobic and Anaerobic :  No results found for: LABAERO  No results found for: LABANAE    Urinalysis:      Lab Results   Component Value Date    NITRU NEGATIVE 11/15/2020    WBCUA NONE 08/12/2019    WBCUA 6-10 10/17/2015    BACTERIA NONE 08/12/2019    RBCUA NONE 08/12/2019    BLOODU NEGATIVE 11/15/2020    SPECGRAV >1.030 08/12/2019    GLUCOSEU >= 1000 11/15/2020       Radiology:  No orders to display     No results found.         DVT prophylaxis: [] Lovenox                                 [x] SCDs                                 [] SQ Heparin [] Encourage ambulation           [] Already on Anticoagulation     Code Status: Prior    Tele:   [x] yes             [] no    Electronically signed by Dayna Miller DO on 1/17/2021 at 6:12 PM

## 2021-01-17 NOTE — PROGRESS NOTES
Postop day 2 status post uvulopalatopharyngoplasty and left hemithyroidectomy    The patient is taking minuscule amounts of p.o. He claims that it hurts too much and therefore he cannot swallow. On general physical exam the patient has had no magic mouthwash except for on 1 occasion and that probably was only swishing the medication in his mouth and then spitting it out. I therefore had the patient take 5 cc and gargle at. After that he was able to swallow to a significant degree. I worked with the patient extensively at the bedside having him sit up on the side of the bed and work on sipping water and mashed potatoes and gravy. He then changed over to sherbet all of which he was able to get down to a significant degree. I did this with his nurse at the bedside demonstrating for her that he was able to be coached through this after an adequate gargling with the lidocaine Benadryl mixture. Examining his neck wound, it was clean dry and intact and his drain was holding. The output in the drain was now peach colored as would be expected if all is going well. Impressions and plan: Based on the patient's response to this program, I encouraged his nurse to give him access to the Magic mouthwash every 3-4 hours to keep him taking her oral nutrition. I encouraged the patient to \"graze\" so that he could increase his oral nutrition and fluid intake. I will decrease his IV fluids to 50 cc/h to increase his thirst.  He needs to be able to take his medications as well because he cannot go home with an IV to take IV antiseizure medicines. That is also true regarding his oral pain medication. Nursing is on board and so was the patient. The patient's wife was at the bedside throughout this process and is aware of the issues and will further encourage them. Of note is the fact that she has watched him taking a nap and noted that he was breathing silently.   I do something he has not done in his adult

## 2021-01-17 NOTE — PROGRESS NOTES
Hospitalist Progress Note      Patient:  Ting Greenfield    Unit/Bed:4K-24/024-A  YOB: 1970  MRN: [id number]   Acct: [de-identified]   PCP: Kieran Oppenheim, MD  Date of Admission: 1/15/2021    Assessment/Plan:    1. 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 Patient tolerated surgery well. ENT attending/managing. 1/16: pt not taking PO in any significant capacity, encouraged use of viscous lidocaine  1/17: continuing to encourage PO intake  2. Seizure disorder: Complex history with dynamic seizure medication dosing per primary. Continue home doses of Depakote, Keppra, zonisamide, and lacosamide. Continue to monitor closely, maintain seizure precautions. 1/16: pt refusing to take PO, discussed the need for this with him, but in the meantime give IV vimpat and keppra - no IV formulation of Zonegran available (pt on depakote for mood, not sz)  1/17: did not take zonegran last night, encouraging he do so tonight and continue with IV for now  3. Type 2 diabetes mellitus: Hemoglobin A1c 9.3 on 12/22/2020. Current blood glucose level at 225. Start Accu-Cheks before meals and at bedtime with sliding scale insulin coverage (low-dose corrective algorithm). Hold Lantus and additional mealtime coverage insulin until patient is able to tolerate more substantial diet; continue Jardiance. Hypoglycemia protocol in place, maintain carb controlled diet when cleared by primary. 1/16: resume lantus at 50 units BID and monitor, continue with just a SSI, not his typical high dose of mealtime coverage for now  1/17: glucose stable so far, continue to adjust as PO intake increases  4. Essential hypertension: History, not at goal.  Likely secondary to pain response. Continue metoprolol. Labetalol available PRN for SBP >165. 1/16: as above, not taking PO, trying to encourage this  5.  Hyperlipidemia: Continue fenofibrate and Subcutaneous TID     insulin lispro  0-9 Units Subcutaneous Nightly    lacosamide (VIMPAT) IVPB  300 mg Intravenous BID    metoprolol  5 mg Intravenous Q4H    valproate sodium (DEPACON) IVPB  250 mg Intravenous Q6H    neomycin-bacitracin-polymyxin   Topical 3 times per day    atorvastatin  10 mg Oral Daily    vitamin B-12  2,500 mcg Oral Daily    [Held by provider] divalproex  500 mg Oral BID    DULoxetine  120 mg Oral Daily    empagliflozin  25 mg Oral Daily    [Held by provider] fenofibrate  160 mg Oral Daily    [Held by provider] insulin lispro  30 Units Subcutaneous TID AC    [Held by provider] levETIRAcetam  2,000 mg Oral BID    [Held by provider] metoprolol tartrate  75 mg Oral BID    pantoprazole  40 mg Oral BID    rOPINIRole  1 mg Oral TID    zonisamide  400 mg Oral Nightly    [Held by provider] lacosamide  300 mg Oral BID    levetiracetam  2,000 mg Intravenous Q12H     PRN Meds: magic (miracle) mouthwash, LORazepam, glucose, dextrose, glucagon (rDNA), dextrose, labetalol, promethazine, HYDROmorphone **OR** HYDROmorphone, HYDROcodone-acetaminophen **OR** oxyCODONE-acetaminophen **OR** oxyCODONE-acetaminophen, ondansetron      Intake/Output Summary (Last 24 hours) at 1/17/2021 1819  Last data filed at 1/17/2021 1404  Gross per 24 hour   Intake 3572.62 ml   Output 3755 ml   Net -182.38 ml       Diet:  DIET GENERAL; Dysphagia Pureed    Exam:  /70   Pulse 84   Temp 98.7 °F (37.1 °C) (Oral)   Resp 18   Ht 6' 2\" (1.88 m)   Wt 252 lb 12.8 oz (114.7 kg)   SpO2 94%   BMI 32.46 kg/m²   General appearance:  No apparent distress, appears stated age and cooperative. HEENT: Pupils equal, round, and reactive to light. Conjunctivae/corneas clear. Neck: Supple, with full range of motion. No jugular venous distention. Trachea midline with clean incision  Respiratory:  Normal respiratory effort. Clear to auscultation, bilaterally without Rales/Wheezes/Rhonchi.   Cardiovascular: Regular rate 08/12/2019    BLOODU NEGATIVE 11/15/2020    SPECGRAV >1.030 08/12/2019    GLUCOSEU >= 1000 11/15/2020       Radiology:  No orders to display     No results found.         DVT prophylaxis: [] Lovenox                                 [x] SCDs                                 [] SQ Heparin                                 [] Encourage ambulation           [] Already on Anticoagulation     Code Status: Prior    Tele:   [x] yes             [] no    Electronically signed by Horald Schwab, DO on 1/17/2021 at 6:19 PM

## 2021-01-18 LAB
ALBUMIN SERPL-MCNC: 3.6 G/DL (ref 3.5–5.1)
ALP BLD-CCNC: 56 U/L (ref 38–126)
ALT SERPL-CCNC: 18 U/L (ref 11–66)
ANION GAP SERPL CALCULATED.3IONS-SCNC: 9 MEQ/L (ref 8–16)
AST SERPL-CCNC: 13 U/L (ref 5–40)
BILIRUB SERPL-MCNC: 1.4 MG/DL (ref 0.3–1.2)
BUN BLDV-MCNC: 6 MG/DL (ref 7–22)
CALCIUM SERPL-MCNC: 8.3 MG/DL (ref 8.5–10.5)
CHLORIDE BLD-SCNC: 110 MEQ/L (ref 98–111)
CO2: 25 MEQ/L (ref 23–33)
CREAT SERPL-MCNC: 0.4 MG/DL (ref 0.4–1.2)
ERYTHROCYTE [DISTWIDTH] IN BLOOD BY AUTOMATED COUNT: 12.8 % (ref 11.5–14.5)
ERYTHROCYTE [DISTWIDTH] IN BLOOD BY AUTOMATED COUNT: 40.5 FL (ref 35–45)
GFR SERPL CREATININE-BSD FRML MDRD: > 90 ML/MIN/1.73M2
GLUCOSE BLD-MCNC: 102 MG/DL (ref 70–108)
GLUCOSE BLD-MCNC: 116 MG/DL (ref 70–108)
GLUCOSE BLD-MCNC: 125 MG/DL (ref 70–108)
GLUCOSE BLD-MCNC: 155 MG/DL (ref 70–108)
GLUCOSE BLD-MCNC: 162 MG/DL (ref 70–108)
HCT VFR BLD CALC: 39.5 % (ref 42–52)
HEMOGLOBIN: 14.1 GM/DL (ref 14–18)
MAGNESIUM: 1.7 MG/DL (ref 1.6–2.4)
MCH RBC QN AUTO: 31.5 PG (ref 26–33)
MCHC RBC AUTO-ENTMCNC: 35.7 GM/DL (ref 32.2–35.5)
MCV RBC AUTO: 88.2 FL (ref 80–94)
PLATELET # BLD: 170 THOU/MM3 (ref 130–400)
PMV BLD AUTO: 9.7 FL (ref 9.4–12.4)
POTASSIUM REFLEX MAGNESIUM: 3.3 MEQ/L (ref 3.5–5.2)
RBC # BLD: 4.48 MILL/MM3 (ref 4.7–6.1)
SODIUM BLD-SCNC: 144 MEQ/L (ref 135–145)
TOTAL PROTEIN: 6.2 G/DL (ref 6.1–8)
WBC # BLD: 6.1 THOU/MM3 (ref 4.8–10.8)

## 2021-01-18 PROCEDURE — C9254 INJECTION, LACOSAMIDE: HCPCS | Performed by: PHYSICIAN ASSISTANT

## 2021-01-18 PROCEDURE — 83735 ASSAY OF MAGNESIUM: CPT

## 2021-01-18 PROCEDURE — 80053 COMPREHEN METABOLIC PANEL: CPT

## 2021-01-18 PROCEDURE — 82948 REAGENT STRIP/BLOOD GLUCOSE: CPT

## 2021-01-18 PROCEDURE — 85027 COMPLETE CBC AUTOMATED: CPT

## 2021-01-18 PROCEDURE — 6370000000 HC RX 637 (ALT 250 FOR IP): Performed by: INTERNAL MEDICINE

## 2021-01-18 PROCEDURE — 2500000003 HC RX 250 WO HCPCS: Performed by: PHYSICIAN ASSISTANT

## 2021-01-18 PROCEDURE — 36415 COLL VENOUS BLD VENIPUNCTURE: CPT

## 2021-01-18 PROCEDURE — 6360000002 HC RX W HCPCS: Performed by: PHYSICIAN ASSISTANT

## 2021-01-18 PROCEDURE — 2060000000 HC ICU INTERMEDIATE R&B

## 2021-01-18 PROCEDURE — 6370000000 HC RX 637 (ALT 250 FOR IP): Performed by: OTOLARYNGOLOGY

## 2021-01-18 PROCEDURE — 2580000003 HC RX 258: Performed by: PHYSICIAN ASSISTANT

## 2021-01-18 PROCEDURE — 6370000000 HC RX 637 (ALT 250 FOR IP): Performed by: PHYSICIAN ASSISTANT

## 2021-01-18 PROCEDURE — 99232 SBSQ HOSP IP/OBS MODERATE 35: CPT | Performed by: INTERNAL MEDICINE

## 2021-01-18 PROCEDURE — 6370000000 HC RX 637 (ALT 250 FOR IP): Performed by: STUDENT IN AN ORGANIZED HEALTH CARE EDUCATION/TRAINING PROGRAM

## 2021-01-18 RX ORDER — POTASSIUM CHLORIDE 7.45 MG/ML
10 INJECTION INTRAVENOUS PRN
Status: DISCONTINUED | OUTPATIENT
Start: 2021-01-18 | End: 2021-01-25 | Stop reason: HOSPADM

## 2021-01-18 RX ORDER — POTASSIUM CHLORIDE 20 MEQ/1
40 TABLET, EXTENDED RELEASE ORAL PRN
Status: DISCONTINUED | OUTPATIENT
Start: 2021-01-18 | End: 2021-01-25 | Stop reason: HOSPADM

## 2021-01-18 RX ADMIN — LEVETIRACETAM 2000 MG: 100 INJECTION, SOLUTION INTRAVENOUS at 23:32

## 2021-01-18 RX ADMIN — OXYCODONE HYDROCHLORIDE AND ACETAMINOPHEN 2 TABLET: 5; 325 TABLET ORAL at 20:13

## 2021-01-18 RX ADMIN — DEXTROSE MONOHYDRATE 300 MG: 50 INJECTION, SOLUTION INTRAVENOUS at 09:21

## 2021-01-18 RX ADMIN — INSULIN GLARGINE 50 UNITS: 100 INJECTION, SOLUTION SUBCUTANEOUS at 20:44

## 2021-01-18 RX ADMIN — METOPROLOL TARTRATE 5 MG: 5 INJECTION, SOLUTION INTRAVENOUS at 23:32

## 2021-01-18 RX ADMIN — METOPROLOL TARTRATE 5 MG: 5 INJECTION, SOLUTION INTRAVENOUS at 04:32

## 2021-01-18 RX ADMIN — LEVETIRACETAM 2000 MG: 100 INJECTION, SOLUTION INTRAVENOUS at 14:48

## 2021-01-18 RX ADMIN — INSULIN GLARGINE 50 UNITS: 100 INJECTION, SOLUTION SUBCUTANEOUS at 09:06

## 2021-01-18 RX ADMIN — BACITRACIN ZINC NEOMYCIN SULFATE POLYMYXIN B SULFATE: 400; 3.5; 5 OINTMENT TOPICAL at 20:11

## 2021-01-18 RX ADMIN — LORAZEPAM 0.5 MG: 0.5 TABLET ORAL at 05:22

## 2021-01-18 RX ADMIN — VALPROATE SODIUM 250 MG: 100 INJECTION, SOLUTION INTRAVENOUS at 04:33

## 2021-01-18 RX ADMIN — VALPROATE SODIUM 250 MG: 100 INJECTION, SOLUTION INTRAVENOUS at 00:14

## 2021-01-18 RX ADMIN — METOPROLOL TARTRATE 5 MG: 5 INJECTION, SOLUTION INTRAVENOUS at 14:47

## 2021-01-18 RX ADMIN — BACITRACIN ZINC NEOMYCIN SULFATE POLYMYXIN B SULFATE: 400; 3.5; 5 OINTMENT TOPICAL at 05:17

## 2021-01-18 RX ADMIN — ROPINIROLE 1 MG: 1 TABLET, FILM COATED ORAL at 14:47

## 2021-01-18 RX ADMIN — LORAZEPAM 0.5 MG: 0.5 TABLET ORAL at 16:06

## 2021-01-18 RX ADMIN — METOPROLOL TARTRATE 5 MG: 5 INJECTION, SOLUTION INTRAVENOUS at 20:11

## 2021-01-18 RX ADMIN — METOPROLOL TARTRATE 5 MG: 5 INJECTION, SOLUTION INTRAVENOUS at 09:06

## 2021-01-18 RX ADMIN — BACITRACIN ZINC NEOMYCIN SULFATE POLYMYXIN B SULFATE: 400; 3.5; 5 OINTMENT TOPICAL at 14:48

## 2021-01-18 RX ADMIN — POTASSIUM BICARBONATE 40 MEQ: 782 TABLET, EFFERVESCENT ORAL at 15:02

## 2021-01-18 RX ADMIN — DEXTROSE MONOHYDRATE 300 MG: 50 INJECTION, SOLUTION INTRAVENOUS at 21:24

## 2021-01-18 RX ADMIN — OXYCODONE HYDROCHLORIDE AND ACETAMINOPHEN 2 TABLET: 5; 325 TABLET ORAL at 14:24

## 2021-01-18 RX ADMIN — ROPINIROLE 1 MG: 1 TABLET, FILM COATED ORAL at 20:16

## 2021-01-18 RX ADMIN — PANTOPRAZOLE SODIUM 40 MG: 40 TABLET, DELAYED RELEASE ORAL at 20:17

## 2021-01-18 ASSESSMENT — PAIN DESCRIPTION - ONSET: ONSET: ON-GOING

## 2021-01-18 ASSESSMENT — PAIN SCALES - GENERAL
PAINLEVEL_OUTOF10: 6
PAINLEVEL_OUTOF10: 8
PAINLEVEL_OUTOF10: 8

## 2021-01-18 ASSESSMENT — PAIN DESCRIPTION - PAIN TYPE: TYPE: SURGICAL PAIN

## 2021-01-18 ASSESSMENT — PAIN DESCRIPTION - LOCATION: LOCATION: NECK

## 2021-01-18 ASSESSMENT — PAIN DESCRIPTION - PROGRESSION: CLINICAL_PROGRESSION: NOT CHANGED

## 2021-01-18 NOTE — PROGRESS NOTES
0705-patient stood up and said that he got dizzy, said when this happens he normally has a complex seizure. This RN gave patient ativan and checked on him frequently.  Patient stated he felt like he had a semi seizure

## 2021-01-18 NOTE — PROGRESS NOTES
Postop day 3 status post uvulopalatopharyngoplasty and left hemithyroidectomytop day 2 status post uvulopalatopharyngoplasty and left hemithyroidectomy    The patient reports feeling a little bit better today than yesterday but states that he has had nothing to eat or drink this morning so far because he \"had a seizure\". According the nursing the patient may have had a complex partial seizure. This fact has not been definitively established. On physical examination the patient had a normal sounding speech pattern neither hypo nor hyponasal.  His tongue movement was normal.  His oropharyngeal suture line was intact. No evidence of recent bleeding could be seen posteriorly or on his tongue. The patient was cognitively at his baseline. The patient's neck wound was healing well, clean dry and intact. His drain was holding and the output was appropriate and had changed color from peach to light placido. Based on his history and these findings, the patient still has a significant challenge to get back to a normal diet sufficient to be safe for discharge. I will recommend decreasing his IV fluids to minimum and letting thirst drive his desire to take oral fluids. Once he can drink fluids reliably, he will be able to take his medicines most likely. His drain will likely be able to be removed either later today or tomorrow morning. I discussed this with the patient's hospitalist team and his nurse this morning. Kristi Mas.  Martita Cobian, [address]  Cell: [phone]

## 2021-01-18 NOTE — CARE COORDINATION
DISASTER CHARTING    1/18/21, 12:48 PM EST    DISCHARGE ONGOING EVALUATION:     [address] [facility] day: 2  Location: -24/024-A Reason for admit: Follicular neoplasm of thyroid [D49.7]  MISTI (obstructive sleep apnea) [G47.33]   Barriers to Discharge: From [facility] w 1/15 thyroid lobectomy, uvulopalatopharyngoplasty (history seizure disorder, DM, Bipolar); ENT plans drain removal today or in am. Throat pain better today per patient; no pain meds taken today. IV Vimpat, IV Keppra continued.  General Diet: Dysphagia Pureed continued  PCP: Elyssa Cardenas MD  Readmission Risk Score: 15%  Patient Goals/Plan/Treatment Preferences: met w client; denied needs as plans home with spouse Tyree Xie independently as PTA

## 2021-01-19 LAB
ALBUMIN SERPL-MCNC: 3.5 G/DL (ref 3.5–5.1)
ALP BLD-CCNC: 79 U/L (ref 38–126)
ALT SERPL-CCNC: 22 U/L (ref 11–66)
ANION GAP SERPL CALCULATED.3IONS-SCNC: 8 MEQ/L (ref 8–16)
AST SERPL-CCNC: 19 U/L (ref 5–40)
BILIRUB SERPL-MCNC: 1.3 MG/DL (ref 0.3–1.2)
BUN BLDV-MCNC: 7 MG/DL (ref 7–22)
CALCIUM SERPL-MCNC: 8.8 MG/DL (ref 8.5–10.5)
CHLORIDE BLD-SCNC: 106 MEQ/L (ref 98–111)
CO2: 26 MEQ/L (ref 23–33)
CREAT SERPL-MCNC: 0.5 MG/DL (ref 0.4–1.2)
EKG ATRIAL RATE: 67 BPM
EKG P AXIS: 18 DEGREES
EKG P-R INTERVAL: 174 MS
EKG Q-T INTERVAL: 404 MS
EKG QRS DURATION: 92 MS
EKG QTC CALCULATION (BAZETT): 426 MS
EKG R AXIS: 20 DEGREES
EKG T AXIS: 3 DEGREES
EKG VENTRICULAR RATE: 67 BPM
ERYTHROCYTE [DISTWIDTH] IN BLOOD BY AUTOMATED COUNT: 12.8 % (ref 11.5–14.5)
ERYTHROCYTE [DISTWIDTH] IN BLOOD BY AUTOMATED COUNT: 40.2 FL (ref 35–45)
GFR SERPL CREATININE-BSD FRML MDRD: > 90 ML/MIN/1.73M2
GLUCOSE BLD-MCNC: 118 MG/DL (ref 70–108)
GLUCOSE BLD-MCNC: 68 MG/DL (ref 70–108)
GLUCOSE BLD-MCNC: 72 MG/DL (ref 70–108)
GLUCOSE BLD-MCNC: 86 MG/DL (ref 70–108)
GLUCOSE BLD-MCNC: 92 MG/DL (ref 70–108)
HCT VFR BLD CALC: 41.3 % (ref 42–52)
HEMOGLOBIN: 14.7 GM/DL (ref 14–18)
MAGNESIUM: 1.7 MG/DL (ref 1.6–2.4)
MCH RBC QN AUTO: 31 PG (ref 26–33)
MCHC RBC AUTO-ENTMCNC: 35.6 GM/DL (ref 32.2–35.5)
MCV RBC AUTO: 87.1 FL (ref 80–94)
PLATELET # BLD: 228 THOU/MM3 (ref 130–400)
PMV BLD AUTO: 9.4 FL (ref 9.4–12.4)
POTASSIUM REFLEX MAGNESIUM: 2.9 MEQ/L (ref 3.5–5.2)
POTASSIUM SERPL-SCNC: 3.6 MEQ/L (ref 3.5–5.2)
RBC # BLD: 4.74 MILL/MM3 (ref 4.7–6.1)
SODIUM BLD-SCNC: 140 MEQ/L (ref 135–145)
TOTAL PROTEIN: 6.5 G/DL (ref 6.1–8)
WBC # BLD: 6.5 THOU/MM3 (ref 4.8–10.8)

## 2021-01-19 PROCEDURE — 6370000000 HC RX 637 (ALT 250 FOR IP): Performed by: OTOLARYNGOLOGY

## 2021-01-19 PROCEDURE — 36415 COLL VENOUS BLD VENIPUNCTURE: CPT

## 2021-01-19 PROCEDURE — 6370000000 HC RX 637 (ALT 250 FOR IP): Performed by: PHYSICIAN ASSISTANT

## 2021-01-19 PROCEDURE — 93005 ELECTROCARDIOGRAM TRACING: CPT | Performed by: INTERNAL MEDICINE

## 2021-01-19 PROCEDURE — 83735 ASSAY OF MAGNESIUM: CPT

## 2021-01-19 PROCEDURE — 82948 REAGENT STRIP/BLOOD GLUCOSE: CPT

## 2021-01-19 PROCEDURE — 99232 SBSQ HOSP IP/OBS MODERATE 35: CPT | Performed by: INTERNAL MEDICINE

## 2021-01-19 PROCEDURE — 84132 ASSAY OF SERUM POTASSIUM: CPT

## 2021-01-19 PROCEDURE — 6360000002 HC RX W HCPCS: Performed by: STUDENT IN AN ORGANIZED HEALTH CARE EDUCATION/TRAINING PROGRAM

## 2021-01-19 PROCEDURE — 6370000000 HC RX 637 (ALT 250 FOR IP): Performed by: INTERNAL MEDICINE

## 2021-01-19 PROCEDURE — 80053 COMPREHEN METABOLIC PANEL: CPT

## 2021-01-19 PROCEDURE — 2060000000 HC ICU INTERMEDIATE R&B

## 2021-01-19 PROCEDURE — 85027 COMPLETE CBC AUTOMATED: CPT

## 2021-01-19 PROCEDURE — 2500000003 HC RX 250 WO HCPCS: Performed by: PHYSICIAN ASSISTANT

## 2021-01-19 RX ORDER — LACOSAMIDE 200 MG/1
300 TABLET ORAL 2 TIMES DAILY
Status: DISCONTINUED | OUTPATIENT
Start: 2021-01-19 | End: 2021-01-25 | Stop reason: HOSPADM

## 2021-01-19 RX ORDER — INSULIN GLARGINE 100 [IU]/ML
35 INJECTION, SOLUTION SUBCUTANEOUS 2 TIMES DAILY
Status: DISCONTINUED | OUTPATIENT
Start: 2021-01-19 | End: 2021-01-25 | Stop reason: HOSPADM

## 2021-01-19 RX ORDER — LEVETIRACETAM 500 MG/1
2000 TABLET ORAL 2 TIMES DAILY
Status: DISCONTINUED | OUTPATIENT
Start: 2021-01-19 | End: 2021-01-25 | Stop reason: HOSPADM

## 2021-01-19 RX ADMIN — POTASSIUM CHLORIDE 10 MEQ: 7.46 INJECTION, SOLUTION INTRAVENOUS at 08:42

## 2021-01-19 RX ADMIN — METOPROLOL TARTRATE 75 MG: 25 TABLET ORAL at 20:59

## 2021-01-19 RX ADMIN — BACITRACIN ZINC NEOMYCIN SULFATE POLYMYXIN B SULFATE: 400; 3.5; 5 OINTMENT TOPICAL at 06:07

## 2021-01-19 RX ADMIN — LEVETIRACETAM 2000 MG: 500 TABLET ORAL at 11:09

## 2021-01-19 RX ADMIN — BACITRACIN ZINC NEOMYCIN SULFATE POLYMYXIN B SULFATE: 400; 3.5; 5 OINTMENT TOPICAL at 21:00

## 2021-01-19 RX ADMIN — METOPROLOL TARTRATE 75 MG: 25 TABLET ORAL at 11:09

## 2021-01-19 RX ADMIN — ROPINIROLE 1 MG: 1 TABLET, FILM COATED ORAL at 21:00

## 2021-01-19 RX ADMIN — PANTOPRAZOLE SODIUM 40 MG: 40 TABLET, DELAYED RELEASE ORAL at 21:00

## 2021-01-19 RX ADMIN — PANTOPRAZOLE SODIUM 40 MG: 40 TABLET, DELAYED RELEASE ORAL at 08:45

## 2021-01-19 RX ADMIN — POTASSIUM CHLORIDE 10 MEQ: 7.46 INJECTION, SOLUTION INTRAVENOUS at 09:48

## 2021-01-19 RX ADMIN — OXYCODONE HYDROCHLORIDE AND ACETAMINOPHEN 2 TABLET: 5; 325 TABLET ORAL at 21:03

## 2021-01-19 RX ADMIN — OXYCODONE HYDROCHLORIDE AND ACETAMINOPHEN 2 TABLET: 5; 325 TABLET ORAL at 08:51

## 2021-01-19 RX ADMIN — LEVETIRACETAM 2000 MG: 500 TABLET ORAL at 20:59

## 2021-01-19 RX ADMIN — METOPROLOL TARTRATE 5 MG: 5 INJECTION, SOLUTION INTRAVENOUS at 03:52

## 2021-01-19 RX ADMIN — POTASSIUM CHLORIDE 10 MEQ: 7.46 INJECTION, SOLUTION INTRAVENOUS at 06:59

## 2021-01-19 RX ADMIN — INSULIN GLARGINE 35 UNITS: 100 INJECTION, SOLUTION SUBCUTANEOUS at 21:01

## 2021-01-19 RX ADMIN — DULOXETIN HYDROCHLORIDE 120 MG: 60 CAPSULE, DELAYED RELEASE ORAL at 08:45

## 2021-01-19 RX ADMIN — POTASSIUM CHLORIDE 10 MEQ: 7.46 INJECTION, SOLUTION INTRAVENOUS at 11:14

## 2021-01-19 RX ADMIN — DIVALPROEX SODIUM 500 MG: 500 TABLET, DELAYED RELEASE ORAL at 20:58

## 2021-01-19 RX ADMIN — ATORVASTATIN CALCIUM 10 MG: 10 TABLET, FILM COATED ORAL at 08:44

## 2021-01-19 RX ADMIN — Medication 5 ML: at 11:21

## 2021-01-19 RX ADMIN — LACOSAMIDE 300 MG: 200 TABLET ORAL at 20:59

## 2021-01-19 RX ADMIN — ROPINIROLE 1 MG: 1 TABLET, FILM COATED ORAL at 16:25

## 2021-01-19 RX ADMIN — POTASSIUM CHLORIDE 10 MEQ: 7.46 INJECTION, SOLUTION INTRAVENOUS at 12:23

## 2021-01-19 RX ADMIN — Medication 5 ML: at 16:27

## 2021-01-19 RX ADMIN — ZONISAMIDE 400 MG: 100 CAPSULE ORAL at 21:00

## 2021-01-19 RX ADMIN — LACOSAMIDE 300 MG: 200 TABLET ORAL at 11:09

## 2021-01-19 RX ADMIN — BACITRACIN ZINC NEOMYCIN SULFATE POLYMYXIN B SULFATE: 400; 3.5; 5 OINTMENT TOPICAL at 14:05

## 2021-01-19 RX ADMIN — POTASSIUM CHLORIDE 10 MEQ: 7.46 INJECTION, SOLUTION INTRAVENOUS at 06:07

## 2021-01-19 RX ADMIN — ROPINIROLE 1 MG: 1 TABLET, FILM COATED ORAL at 08:45

## 2021-01-19 RX ADMIN — OXYCODONE HYDROCHLORIDE AND ACETAMINOPHEN 1 TABLET: 5; 325 TABLET ORAL at 14:17

## 2021-01-19 ASSESSMENT — PAIN DESCRIPTION - DESCRIPTORS
DESCRIPTORS: ACHING
DESCRIPTORS: SHARP

## 2021-01-19 ASSESSMENT — PAIN SCALES - GENERAL
PAINLEVEL_OUTOF10: 7
PAINLEVEL_OUTOF10: 8
PAINLEVEL_OUTOF10: 8
PAINLEVEL_OUTOF10: 2
PAINLEVEL_OUTOF10: 6

## 2021-01-19 ASSESSMENT — PAIN DESCRIPTION - ONSET: ONSET: ON-GOING

## 2021-01-19 ASSESSMENT — PAIN DESCRIPTION - LOCATION
LOCATION: CHEST
LOCATION: NECK

## 2021-01-19 ASSESSMENT — PAIN DESCRIPTION - PAIN TYPE
TYPE: ACUTE PAIN
TYPE: SURGICAL PAIN

## 2021-01-19 ASSESSMENT — PAIN DESCRIPTION - PROGRESSION: CLINICAL_PROGRESSION: NOT CHANGED

## 2021-01-19 ASSESSMENT — PAIN DESCRIPTION - ORIENTATION: ORIENTATION: MID

## 2021-01-19 NOTE — PROGRESS NOTES
This RN gave patient pills and watch him take them, after this RN walked out to get chem machine when coming back in this RN noticed some of the pills in the trash can when this RN asked patient about it patient said those are from the other day when he was unable to take pills, looking back at [facility] patient has not taken them the past two nights. Patient states that he took these pills tonight though. This RN is not sure if patient took these pills. I also told patient that usually housekeeping comes and empties the trash cans everyday and patient said they have not changed them in a while. The trash can was not too full when looking at it. This RN only seen the 4 zonisamide pills in the trash can.

## 2021-01-19 NOTE — CARE COORDINATION
DISASTER CHARTING    1/19/21, 11:26 AM EST    DISCHARGE ONGOING EVALUATION:     [address] [facility] day: 3  Location: -24/024-A Reason for admit: Follicular neoplasm of thyroid [D49.7]  MISTI (obstructive sleep apnea) [G47.33]   Barriers to Discharge:   From [facility] w 1/15 left analy-thyroid lobectomy, uvulopalatopharyngoplasty (history seizure disorder, DM, Bipolar); ENT plans drain removal today or in am. Throat pain better today per patient; no pain meds taken today. General Diet: Dysphagia Pureed Diet continued; IV to PO meds today. Percocet for pain, Magic Mouthwash continued; plans home in am  PCP: Reddy Shell MD  Readmission Risk Score: 13%  Patient Goals/Plan/Treatment Preferences: plans home with spouse Tyrone Cardenas independently as PTA likely in am per ENT; denied needs    1/19/21, 11:31 AM EST    Patient goals/plan/ treatment preferences discussed by  and . Patient goals/plan/ treatment preferences reviewed with patient/ family. Patient/ family verbalize understanding of discharge plan and are in agreement with goal/plan/treatment preferences. Understanding was demonstrated using the teach back method. AVS provided by RN at time of discharge, which includes all necessary medical information pertaining to the patients current course of illness, treatment, post-discharge goals of care, and treatment preferences.         IMM Letter  IMM Letter given to Patient/Family/Significant other/Guardian/POA/by[de-identified]   IMM Letter date given[de-identified] 01/18/21  IMM Letter time given[de-identified] 2407

## 2021-01-19 NOTE — PROGRESS NOTES
Hospitalist Progress Note      Patient:  Anabel Aguilar    Unit/Bed:4K-24/024-A  YOB: 1970  MRN: [id number]   Acct: [de-identified]   PCP: Milton Buchanan MD  Date of Admission: 1/15/2021    Assessment/Plan:    1. 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 Patient tolerated surgery well. ENT attending/managing. 1/16: pt not taking PO in any significant capacity, encouraged use of viscous lidocaine  1/17: continuing to encourage PO intake  2. Seizure disorder: Complex history with dynamic seizure medication dosing per primary. Continue home doses of Depakote, Keppra, zonisamide, and lacosamide. Continue to monitor closely, maintain seizure precautions. 1/16: pt refusing to take PO, discussed the need for this with him, but in the meantime give IV vimpat and keppra - no IV formulation of Zonegran available (pt on depakote for mood, not sz)  1/17: did not take zonegran last night, encouraging he do so tonight and continue with IV for now  1/18: says he will \"try\" to take Kody Banks, did feel like he had \"semi seizure\" early this AM but is fine now  3. Type 2 diabetes mellitus: Hemoglobin A1c 9.3 on 12/22/2020. Current blood glucose level at 225. Start Accu-Cheks before meals and at bedtime with sliding scale insulin coverage (low-dose corrective algorithm). Hold Lantus and additional mealtime coverage insulin until patient is able to tolerate more substantial diet; continue Jardiance. Hypoglycemia protocol in place, maintain carb controlled diet when cleared by primary. 1/16: resume lantus at 50 units BID and monitor, continue with just a SSI, not his typical high dose of mealtime coverage for now  1/17: glucose stable so far, continue to adjust as PO intake increases  4. Essential hypertension: History, not at goal.  Likely secondary to pain response. Continue metoprolol.   Labetalol available PRN for SBP >165.  1/16: as above, not taking PO, trying to encourage this  5. Hyperlipidemia: Continue fenofibrate and atorvastatin. 6. GERD: Continue Protonix. 7. Depression, bipolar disorder: Continue Cymbalta. 8. Restless leg syndrome: Continue Requip. 9. Sleep apnea: History, noted. 10. Obesity: BMI 32.46      Expected discharge date:  ? Disposition:    [x] Home       [] TCU       [] Rehab       [] Psych       [] SNF       [] [facility]       [] Other-    Chief Complaint: Thyroid cancer    Hospital Treatment Course: (Prior to today) \"Nicolas Morfin is a 80-year-old  male, lifetime non-smoker, with a medical history of hyperlipidemia, hypertension, restless leg syndrome, chronic back pain, irritable bowel syndrome, seizure disorder, neuropathy, type 2 diabetes mellitus, anemia, chronic bronchitis, acid reflux, liver disease, depression, bipolar disorder, sleep apnea, pancreatic insufficiency, systolic dysfunction, and obesity. Patient presented to [facility] for a thyroid lobectomy. Patient has a history of a follicular neoplasm of the thyroid and Dr. Silvana Viera performed the thyroid lobectomy on 1/15/2021, the patient tolerated the procedure well. Postoperatively, the patient remained hemodynamically stable and was transitioned to the stepdown unit. The hospitalist service was consulted for medical management. \"    1/16/21: IV seizure medications for now and monitor   1/17/21: continue with IV medications and encouraging PO intake    Subjective (past 24 hours): Continues to complain of throat pain, minimally willing to take PO medications       Past medical history, family history, social history and allergies reviewed again and is unchanged since admission. ROS (12 point review of systems completed. Pertinent positives noted.  Otherwise ROS is negative)     Medications:  Reviewed    Infusion Medications    dextrose       Scheduled Medications    levETIRAcetam  2,000 mg Oral BID    lacosamide  300 mg Oral BID    metoprolol tartrate  75 mg Oral BID    insulin glargine  35 Units Subcutaneous BID    insulin lispro  0-18 Units Subcutaneous TID WC    insulin lispro  0-9 Units Subcutaneous Nightly    neomycin-bacitracin-polymyxin   Topical 3 times per day    atorvastatin  10 mg Oral Daily    vitamin B-12  2,500 mcg Oral Daily    divalproex  500 mg Oral BID    DULoxetine  120 mg Oral Daily    empagliflozin  25 mg Oral Daily    [Held by provider] fenofibrate  160 mg Oral Daily    [Held by provider] insulin lispro  30 Units Subcutaneous TID AC    pantoprazole  40 mg Oral BID    rOPINIRole  1 mg Oral TID    zonisamide  400 mg Oral Nightly     PRN Meds: potassium chloride **OR** potassium alternative oral replacement **OR** potassium chloride, magic (miracle) mouthwash, LORazepam, glucose, dextrose, glucagon (rDNA), dextrose, labetalol, promethazine, HYDROcodone-acetaminophen **OR** oxyCODONE-acetaminophen **OR** oxyCODONE-acetaminophen, ondansetron      Intake/Output Summary (Last 24 hours) at 1/19/2021 1839  Last data filed at 1/19/2021 1400  Gross per 24 hour   Intake 2054.65 ml   Output 860 ml   Net 1194.65 ml       Diet:  DIET GENERAL; Dysphagia Pureed    Exam:  BP (!) 159/90   Pulse 68   Temp 98.4 °F (36.9 °C) (Oral)   Resp 16   Ht 6' 2\" (1.88 m)   Wt 238 lb 1.6 oz (108 kg)   SpO2 93%   BMI 30.57 kg/m²   General appearance:  No apparent distress, appears stated age and cooperative. HEENT: Pupils equal, round, and reactive to light. Conjunctivae/corneas clear. Neck: Supple, with full range of motion. No jugular venous distention. Trachea midline with clean incision  Respiratory:  Normal respiratory effort. Clear to auscultation, bilaterally without Rales/Wheezes/Rhonchi. Cardiovascular: Regular rate and rhythm with normal S1/S2 without murmurs, rubs or gallops. Abdomen: Soft, non-tender, non-distended with normal bowel sounds.   Musculoskeletal: passive and active ROM x 4 extremities. Skin: Skin color, texture, turgor normal.  No rashes or lesions. Neurologic:  Neurovascularly intact without any focal sensory/motor deficits. Cranial nerves: II-XII intact, grossly non-focal.  Psychiatric: Alert and oriented, thought content appropriate, normal insight  Capillary Refill: Brisk,< 3 seconds   Peripheral Pulses: +2 palpable, equal bilaterally     Labs:   Recent Labs     01/17/21 0432 01/18/21 0410 01/19/21  0424   WBC 6.3 6.1 6.5   HGB 13.7* 14.1 14.7   HCT 39.1* 39.5* 41.3*    170 228     Recent Labs     01/17/21 0432 01/18/21 0410 01/19/21 0424 01/19/21  1510    144 140  --    K 3.9 3.3* 2.9* 3.6    110 106  --    CO2 26 25 26  --    BUN 6* 6* 7  --    CREATININE 0.4 0.4 0.5  --    CALCIUM 8.3* 8.3* 8.8  --      Recent Labs     01/17/21 0432 01/18/21 0410 01/19/21  0424   AST 20 13 19   ALT 22 18 22   BILITOT 1.8* 1.4* 1.3*   ALKPHOS 57 56 79     No results for input(s): INR in the last 72 hours. No results for input(s): Fernández Ronald in the last 72 hours.     Microbiology:    Blood culture #1:   Lab Results   Component Value Date    BC No growth-preliminaryNo growth 08/12/2019       Blood culture #2:No results found for: Tereso Strauss    Organism:  Lab Results   Component Value Date    ORG Staphylococcus capitis 04/12/2016       No results found for: LABGRAM    MRSA culture only:No results found for: [facility]    Urine culture:   Lab Results   Component Value Date    LABURIN No growth-preliminaryNo growth 08/12/2019       Respiratory culture: No results found for: CULTRESP    Aerobic and Anaerobic :  No results found for: LABAERO  No results found for: LABANAE    Urinalysis:      Lab Results   Component Value Date    NITRU NEGATIVE 11/15/2020    [address] NONE 08/12/2019    WBCUA 6-10 10/17/2015    BACTERIA NONE 08/12/2019    RBCUA NONE 08/12/2019    BLOODU NEGATIVE 11/15/2020    SPECGRAV >1.030 08/12/2019    GLUCOSEU >= 1000 11/15/2020

## 2021-01-19 NOTE — PROGRESS NOTES
Hospitalist Progress Note      Patient:  Zoe Stahl    Unit/Bed:4K-24/024-A  YOB: 1970  MRN: [id number]   Acct: [de-identified]   PCP: Anna Morel MD  Date of Admission: 1/15/2021    Assessment/Plan:    1. 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 Patient tolerated surgery well. ENT attending/managing. 1/16: pt not taking PO in any significant capacity, encouraged use of viscous lidocaine  1/17: continuing to encourage PO intake  2. Seizure disorder: Complex history with dynamic seizure medication dosing per primary. Continue home doses of Depakote, Keppra, zonisamide, and lacosamide. Continue to monitor closely, maintain seizure precautions. 1/16: pt refusing to take PO, discussed the need for this with him, but in the meantime give IV vimpat and keppra - no IV formulation of Zonegran available (pt on depakote for mood, not sz)  1/17: did not take zonegran last night, encouraging he do so tonight and continue with IV for now  1/18: says he will \"try\" to take Mariel Mail, did feel like he had \"semi seizure\" early this AM but is fine now  1/19: did not take Zonegran, we continue to encourage PO intake. 3. Type 2 diabetes mellitus: Hemoglobin A1c 9.3 on 12/22/2020. Current blood glucose level at 225. Start Accu-Cheks before meals and at bedtime with sliding scale insulin coverage (low-dose corrective algorithm). Hold Lantus and additional mealtime coverage insulin until patient is able to tolerate more substantial diet; continue Jardiance. Hypoglycemia protocol in place, maintain carb controlled diet when cleared by primary. 1/16: resume lantus at 50 units BID and monitor, continue with just a SSI, not his typical high dose of mealtime coverage for now  1/17: glucose stable so far, continue to adjust as PO intake increases  4.  Essential hypertension: History, not at goal.  Likely secondary to pain response. Continue metoprolol. Labetalol available PRN for SBP >165. 1/16: as above, not taking PO, trying to encourage this  5. Hyperlipidemia: Continue fenofibrate and atorvastatin. 6. GERD: Continue Protonix. 7. Depression, bipolar disorder: Continue Cymbalta. 8. Restless leg syndrome: Continue Requip. 9. Sleep apnea: History, noted. 10. Obesity: BMI 32.46      Expected discharge date:  ? Disposition:    [x] Home       [] TCU       [] Rehab       [] Psych       [] SNF       [] [facility]       [] Other-    Chief Complaint: Thyroid cancer    Hospital Treatment Course: (Prior to today) \"Nicolas Harvey is a 60-year-old  male, lifetime non-smoker, with a medical history of hyperlipidemia, hypertension, restless leg syndrome, chronic back pain, irritable bowel syndrome, seizure disorder, neuropathy, type 2 diabetes mellitus, anemia, chronic bronchitis, acid reflux, liver disease, depression, bipolar disorder, sleep apnea, pancreatic insufficiency, systolic dysfunction, and obesity. Patient presented to [facility] for a thyroid lobectomy. Patient has a history of a follicular neoplasm of the thyroid and Dr. Dannielle Torres performed the thyroid lobectomy on 1/15/2021, the patient tolerated the procedure well. Postoperatively, the patient remained hemodynamically stable and was transitioned to the stepdown unit. The hospitalist service was consulted for medical management. \"    1/16/21: IV seizure medications for now and monitor   1/17/21: continue with IV medications and encouraging PO intake  1/19: continue to encourage PO intake, hopeful discharge tomorrow    Subjective (past 24 hours): Continues to complain of throat pain, minimally willing to take PO medications but says he is trying      Past medical history, family history, social history and allergies reviewed again and is unchanged since admission. ROS (12 point review of systems completed.   Pertinent positives noted. Otherwise ROS is negative)     Medications:  Reviewed    Infusion Medications    dextrose       Scheduled Medications    levETIRAcetam  2,000 mg Oral BID    lacosamide  300 mg Oral BID    metoprolol tartrate  75 mg Oral BID    insulin glargine  35 Units Subcutaneous BID    insulin lispro  0-18 Units Subcutaneous TID WC    insulin lispro  0-9 Units Subcutaneous Nightly    neomycin-bacitracin-polymyxin   Topical 3 times per day    atorvastatin  10 mg Oral Daily    vitamin B-12  2,500 mcg Oral Daily    divalproex  500 mg Oral BID    DULoxetine  120 mg Oral Daily    empagliflozin  25 mg Oral Daily    [Held by provider] fenofibrate  160 mg Oral Daily    [Held by provider] insulin lispro  30 Units Subcutaneous TID AC    pantoprazole  40 mg Oral BID    rOPINIRole  1 mg Oral TID    zonisamide  400 mg Oral Nightly     PRN Meds: potassium chloride **OR** potassium alternative oral replacement **OR** potassium chloride, magic (miracle) mouthwash, LORazepam, glucose, dextrose, glucagon (rDNA), dextrose, labetalol, promethazine, HYDROcodone-acetaminophen **OR** oxyCODONE-acetaminophen **OR** oxyCODONE-acetaminophen, ondansetron      Intake/Output Summary (Last 24 hours) at 1/19/2021 1840  Last data filed at 1/19/2021 1400  Gross per 24 hour   Intake 2054.65 ml   Output 860 ml   Net 1194.65 ml       Diet:  DIET GENERAL; Dysphagia Pureed    Exam:  BP (!) 159/90   Pulse 68   Temp 98.4 °F (36.9 °C) (Oral)   Resp 16   Ht 6' 2\" (1.88 m)   Wt 238 lb 1.6 oz (108 kg)   SpO2 93%   BMI 30.57 kg/m²   General appearance:  No apparent distress, appears stated age and cooperative. HEENT: Pupils equal, round, and reactive to light. Conjunctivae/corneas clear. Neck: Supple, with full range of motion. No jugular venous distention. Trachea midline with clean incision  Respiratory:  Normal respiratory effort. Clear to auscultation, bilaterally without Rales/Wheezes/Rhonchi.   Cardiovascular: Regular rate and rhythm with normal S1/S2 without murmurs, rubs or gallops. Abdomen: Soft, non-tender, non-distended with normal bowel sounds. Musculoskeletal: passive and active ROM x 4 extremities. Skin: Skin color, texture, turgor normal.  No rashes or lesions. Neurologic:  Neurovascularly intact without any focal sensory/motor deficits. Cranial nerves: II-XII intact, grossly non-focal.  Psychiatric: Alert and oriented, thought content appropriate, normal insight  Capillary Refill: Brisk,< 3 seconds   Peripheral Pulses: +2 palpable, equal bilaterally     Labs:   Recent Labs     01/17/21 0432 01/18/21 0410 01/19/21 0424   WBC 6.3 6.1 6.5   HGB 13.7* 14.1 14.7   HCT 39.1* 39.5* 41.3*    170 228     Recent Labs     01/17/21 0432 01/18/21 0410 01/19/21 0424 01/19/21  1510    144 140  --    K 3.9 3.3* 2.9* 3.6    110 106  --    CO2 26 25 26  --    BUN 6* 6* 7  --    CREATININE 0.4 0.4 0.5  --    CALCIUM 8.3* 8.3* 8.8  --      Recent Labs     01/17/21 0432 01/18/21 0410 01/19/21 0424   AST 20 13 19   ALT 22 18 22   BILITOT 1.8* 1.4* 1.3*   ALKPHOS 57 56 79     No results for input(s): INR in the last 72 hours. No results for input(s): Cipriano Holm in the last 72 hours.     Microbiology:    Blood culture #1:   Lab Results   Component Value Date    BC No growth-preliminaryNo growth 08/12/2019       Blood culture #2:No results found for: Angela Berry    Organism:  Lab Results   Component Value Date    ORG Staphylococcus capitis 04/12/2016       No results found for: LABGRAM    MRSA culture only:No results found for: [facility]    Urine culture:   Lab Results   Component Value Date    LABURIN No growth-preliminaryNo growth 08/12/2019       Respiratory culture: No results found for: CULTRESP    Aerobic and Anaerobic :  No results found for: LABAERO  No results found for: LABANAE    Urinalysis:      Lab Results   Component Value Date    NITRU NEGATIVE 11/15/2020    [address] NONE 08/12/2019    WBCUA 6-10 10/17/2015 BACTERIA NONE 08/12/2019    RBCUA NONE 08/12/2019    BLOODU NEGATIVE 11/15/2020    SPECGRAV >1.030 08/12/2019    GLUCOSEU >= 1000 11/15/2020       Radiology:  No orders to display     No results found.         DVT prophylaxis: [] Lovenox                                 [x] SCDs                                 [] SQ Heparin                                 [] Encourage ambulation           [] Already on Anticoagulation     Code Status: Prior    Tele:   [x] yes             [] no    Electronically signed by Ulises Caruso DO on 1/19/2021 at 6:40 PM

## 2021-01-19 NOTE — PROGRESS NOTES
Vitals:    01/19/21 0830   BP: 123/75   Pulse: 71   Resp: 14   Temp: 98.5 °F (36.9 °C)   SpO2: 97%     Post op day 4 UPPP and left hemithyroidectomy. Patient sleeping/lightly snoring upon entering room. Awakens to name. Reports throat pain is 7/10, states Percocet and lidocaine viscous not helpful. He did take small amount of soft foods with breakfast.  He is taking ice chips otherwise, which are soothing to his throat. Trying to move towards discharge by increasing PO intake - patient requires a lot of encouragement. His potassium level was 2.9 this morning - receiving IV supplementation. He is taking his daily pills okay otherwise. He is wanting to go home as soon as possible. On physical exam, the patient has normal sounding speech pattern. His tongue movement was normal. Oral mucous membranes are moist. His oropharyngeal suture line was intact. No evidence of recent bleeding could be seen posteriorly or on his tongue. The patient was cognitively at his baseline. The patient's neck wound was healing well, clean dry and intact. His drain was holding and the output was appropriate and had changed color from peach to light placido - less than 5cc in bulb at this time. Bulb was decompressed and suture was clipped. Drain removed. Incision site covered with 2x2 guaze and band aid. 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 Plan:  - Encourage PO intake  - Encourage up out of bed, ambulate  - Continue with current pain medication regimen and viscous lidocaine  - Patient will likely be here overnight yet for correction of hypokalemia. If his potassium levels improve and he continues to take PO adequately and is up/moving, would plan discharge for tomorrow.      Electronically signed by NICANOR Rodriguez CNP on 1/19/2021 at 11:12 AM

## 2021-01-20 ENCOUNTER — APPOINTMENT (OUTPATIENT)
Dept: ULTRASOUND IMAGING | Age: 51
DRG: 625 | End: 2021-01-20
Attending: OTOLARYNGOLOGY
Payer: MEDICARE

## 2021-01-20 LAB
ALBUMIN SERPL-MCNC: 3.7 G/DL (ref 3.5–5.1)
ALBUMIN SERPL-MCNC: 3.9 G/DL (ref 3.5–5.1)
ALP BLD-CCNC: 220 U/L (ref 38–126)
ALP BLD-CCNC: 228 U/L (ref 38–126)
ALP BLD-CCNC: 229 U/L (ref 38–126)
ALT SERPL-CCNC: 67 U/L (ref 11–66)
ALT SERPL-CCNC: 71 U/L (ref 11–66)
ALT SERPL-CCNC: 75 U/L (ref 11–66)
ANION GAP SERPL CALCULATED.3IONS-SCNC: 10 MEQ/L (ref 8–16)
AST SERPL-CCNC: 57 U/L (ref 5–40)
AST SERPL-CCNC: 74 U/L (ref 5–40)
AST SERPL-CCNC: 78 U/L (ref 5–40)
BILIRUB SERPL-MCNC: 1.7 MG/DL (ref 0.3–1.2)
BILIRUB SERPL-MCNC: 1.7 MG/DL (ref 0.3–1.2)
BILIRUBIN DIRECT: 0.4 MG/DL (ref 0–0.3)
BUN BLDV-MCNC: 7 MG/DL (ref 7–22)
CALCIUM SERPL-MCNC: 9 MG/DL (ref 8.5–10.5)
CHLORIDE BLD-SCNC: 107 MEQ/L (ref 98–111)
CO2: 23 MEQ/L (ref 23–33)
CREAT SERPL-MCNC: 0.5 MG/DL (ref 0.4–1.2)
ERYTHROCYTE [DISTWIDTH] IN BLOOD BY AUTOMATED COUNT: 13 % (ref 11.5–14.5)
ERYTHROCYTE [DISTWIDTH] IN BLOOD BY AUTOMATED COUNT: 39.8 FL (ref 35–45)
GFR SERPL CREATININE-BSD FRML MDRD: > 90 ML/MIN/1.73M2
GLUCOSE BLD-MCNC: 101 MG/DL (ref 70–108)
GLUCOSE BLD-MCNC: 101 MG/DL (ref 70–108)
GLUCOSE BLD-MCNC: 66 MG/DL (ref 70–108)
GLUCOSE BLD-MCNC: 79 MG/DL (ref 70–108)
GLUCOSE BLD-MCNC: 79 MG/DL (ref 70–108)
GLUCOSE BLD-MCNC: 96 MG/DL (ref 70–108)
HAV IGM SER IA-ACNC: NEGATIVE
HCT VFR BLD CALC: 43.2 % (ref 42–52)
HEMOGLOBIN: 15.3 GM/DL (ref 14–18)
HEPATITIS B CORE IGM ANTIBODY: NEGATIVE
HEPATITIS B SURFACE ANTIGEN: NEGATIVE
HEPATITIS C ANTIBODY: NEGATIVE
LIPASE: 20.3 U/L (ref 5.6–51.3)
MCH RBC QN AUTO: 30.7 PG (ref 26–33)
MCHC RBC AUTO-ENTMCNC: 35.4 GM/DL (ref 32.2–35.5)
MCV RBC AUTO: 86.7 FL (ref 80–94)
PLATELET # BLD: 249 THOU/MM3 (ref 130–400)
PMV BLD AUTO: 9.3 FL (ref 9.4–12.4)
POTASSIUM REFLEX MAGNESIUM: 3.8 MEQ/L (ref 3.5–5.2)
RBC # BLD: 4.98 MILL/MM3 (ref 4.7–6.1)
REASON FOR REJECTION: NORMAL
REJECTED TEST: NORMAL
SODIUM BLD-SCNC: 140 MEQ/L (ref 135–145)
TOTAL PROTEIN: 6.9 G/DL (ref 6.1–8)
TOTAL PROTEIN: 6.9 G/DL (ref 6.1–8)
WBC # BLD: 7.2 THOU/MM3 (ref 4.8–10.8)

## 2021-01-20 PROCEDURE — 85027 COMPLETE CBC AUTOMATED: CPT

## 2021-01-20 PROCEDURE — 6360000002 HC RX W HCPCS: Performed by: PHYSICIAN ASSISTANT

## 2021-01-20 PROCEDURE — 76705 ECHO EXAM OF ABDOMEN: CPT

## 2021-01-20 PROCEDURE — 84075 ASSAY ALKALINE PHOSPHATASE: CPT

## 2021-01-20 PROCEDURE — 99024 POSTOP FOLLOW-UP VISIT: CPT | Performed by: PHYSICIAN ASSISTANT

## 2021-01-20 PROCEDURE — 2060000000 HC ICU INTERMEDIATE R&B

## 2021-01-20 PROCEDURE — 83690 ASSAY OF LIPASE: CPT

## 2021-01-20 PROCEDURE — 84460 ALANINE AMINO (ALT) (SGPT): CPT

## 2021-01-20 PROCEDURE — 36415 COLL VENOUS BLD VENIPUNCTURE: CPT

## 2021-01-20 PROCEDURE — 6370000000 HC RX 637 (ALT 250 FOR IP): Performed by: PHYSICIAN ASSISTANT

## 2021-01-20 PROCEDURE — 80074 ACUTE HEPATITIS PANEL: CPT

## 2021-01-20 PROCEDURE — 99233 SBSQ HOSP IP/OBS HIGH 50: CPT | Performed by: INTERNAL MEDICINE

## 2021-01-20 PROCEDURE — 6370000000 HC RX 637 (ALT 250 FOR IP): Performed by: OTOLARYNGOLOGY

## 2021-01-20 PROCEDURE — 82948 REAGENT STRIP/BLOOD GLUCOSE: CPT

## 2021-01-20 PROCEDURE — 80053 COMPREHEN METABOLIC PANEL: CPT

## 2021-01-20 PROCEDURE — 6370000000 HC RX 637 (ALT 250 FOR IP): Performed by: INTERNAL MEDICINE

## 2021-01-20 PROCEDURE — 84450 TRANSFERASE (AST) (SGOT): CPT

## 2021-01-20 RX ORDER — KETOROLAC TROMETHAMINE 10 MG/1
10 TABLET, FILM COATED ORAL EVERY 6 HOURS PRN
Qty: 20 TABLET | Refills: 0 | Status: SHIPPED | OUTPATIENT
Start: 2021-01-20 | End: 2021-02-01

## 2021-01-20 RX ORDER — OXYCODONE AND ACETAMINOPHEN 7.5; 325 MG/1; MG/1
1 TABLET ORAL EVERY 6 HOURS PRN
Qty: 12 TABLET | Refills: 0 | Status: SHIPPED | OUTPATIENT
Start: 2021-01-20 | End: 2021-01-23

## 2021-01-20 RX ORDER — LIDOCAINE HYDROCHLORIDE 20 MG/ML
10 SOLUTION OROPHARYNGEAL
Qty: 200 ML | Refills: 2 | Status: SHIPPED | OUTPATIENT
Start: 2021-01-20 | End: 2021-01-28

## 2021-01-20 RX ADMIN — DIVALPROEX SODIUM 500 MG: 500 TABLET, DELAYED RELEASE ORAL at 08:10

## 2021-01-20 RX ADMIN — LACOSAMIDE 300 MG: 200 TABLET ORAL at 08:10

## 2021-01-20 RX ADMIN — METOPROLOL TARTRATE 75 MG: 25 TABLET ORAL at 08:10

## 2021-01-20 RX ADMIN — BACITRACIN ZINC NEOMYCIN SULFATE POLYMYXIN B SULFATE: 400; 3.5; 5 OINTMENT TOPICAL at 06:18

## 2021-01-20 RX ADMIN — DIVALPROEX SODIUM 500 MG: 500 TABLET, DELAYED RELEASE ORAL at 19:45

## 2021-01-20 RX ADMIN — ROPINIROLE 1 MG: 1 TABLET, FILM COATED ORAL at 19:48

## 2021-01-20 RX ADMIN — ONDANSETRON 4 MG: 2 INJECTION INTRAMUSCULAR; INTRAVENOUS at 19:59

## 2021-01-20 RX ADMIN — ZONISAMIDE 400 MG: 100 CAPSULE ORAL at 19:44

## 2021-01-20 RX ADMIN — ROPINIROLE 1 MG: 1 TABLET, FILM COATED ORAL at 08:10

## 2021-01-20 RX ADMIN — LACOSAMIDE 300 MG: 200 TABLET ORAL at 19:45

## 2021-01-20 RX ADMIN — ROPINIROLE 1 MG: 1 TABLET, FILM COATED ORAL at 16:52

## 2021-01-20 RX ADMIN — BACITRACIN ZINC NEOMYCIN SULFATE POLYMYXIN B SULFATE: 400; 3.5; 5 OINTMENT TOPICAL at 16:50

## 2021-01-20 RX ADMIN — OXYCODONE HYDROCHLORIDE AND ACETAMINOPHEN 2 TABLET: 5; 325 TABLET ORAL at 16:51

## 2021-01-20 RX ADMIN — BACITRACIN ZINC NEOMYCIN SULFATE POLYMYXIN B SULFATE: 400; 3.5; 5 OINTMENT TOPICAL at 23:00

## 2021-01-20 RX ADMIN — OXYCODONE HYDROCHLORIDE AND ACETAMINOPHEN 2 TABLET: 5; 325 TABLET ORAL at 08:28

## 2021-01-20 RX ADMIN — Medication 5 ML: at 11:34

## 2021-01-20 RX ADMIN — ONDANSETRON 4 MG: 2 INJECTION INTRAMUSCULAR; INTRAVENOUS at 00:33

## 2021-01-20 RX ADMIN — PANTOPRAZOLE SODIUM 40 MG: 40 TABLET, DELAYED RELEASE ORAL at 19:47

## 2021-01-20 RX ADMIN — LEVETIRACETAM 2000 MG: 500 TABLET ORAL at 19:47

## 2021-01-20 RX ADMIN — LEVETIRACETAM 2000 MG: 500 TABLET ORAL at 08:10

## 2021-01-20 RX ADMIN — METOPROLOL TARTRATE 75 MG: 25 TABLET ORAL at 19:42

## 2021-01-20 RX ADMIN — DULOXETIN HYDROCHLORIDE 120 MG: 60 CAPSULE, DELAYED RELEASE ORAL at 08:10

## 2021-01-20 RX ADMIN — ATORVASTATIN CALCIUM 10 MG: 10 TABLET, FILM COATED ORAL at 08:10

## 2021-01-20 RX ADMIN — PANTOPRAZOLE SODIUM 40 MG: 40 TABLET, DELAYED RELEASE ORAL at 08:10

## 2021-01-20 ASSESSMENT — PAIN SCALES - GENERAL
PAINLEVEL_OUTOF10: 8
PAINLEVEL_OUTOF10: 10
PAINLEVEL_OUTOF10: 7
PAINLEVEL_OUTOF10: 7
PAINLEVEL_OUTOF10: 8

## 2021-01-20 ASSESSMENT — PAIN - FUNCTIONAL ASSESSMENT
PAIN_FUNCTIONAL_ASSESSMENT: ACTIVITIES ARE NOT PREVENTED
PAIN_FUNCTIONAL_ASSESSMENT: ACTIVITIES ARE NOT PREVENTED

## 2021-01-20 ASSESSMENT — PAIN DESCRIPTION - ONSET: ONSET: ON-GOING

## 2021-01-20 ASSESSMENT — PAIN DESCRIPTION - LOCATION: LOCATION: HEAD

## 2021-01-20 ASSESSMENT — PAIN DESCRIPTION - PROGRESSION: CLINICAL_PROGRESSION: NOT CHANGED

## 2021-01-20 NOTE — DISCHARGE SUMMARY
DISCHARGE SUMMARY    Pt Name: Alejandrina Hayes  MRN: [id number]  YOB: 1970  Primary Care Physician: Fidel Johansen MD    Admit date:  1/15/2021  8:06 AM  Discharge date:  1/20/21  Disposition: Home  Admitting Diagnosis: S/P UPPP, S/P partial thyroidectomy, post-operative pain  Discharge Diagnosis:   Patient Active Problem List   Diagnosis Code    Depression F32.9    Liver disease K76.9    Restless legs syndrome G25.81    Chronic back pain M54.9, G89.29    Other chest pain R07.89    Irritable bowel syndrome K58.9    Non compliance w medication regimen Z91.14    Hypertension associated with diabetes ([address] E11.59, I10    Hyperlipidemia with target LDL less than 100 E78.5    IBS (irritable bowel syndrome) K58.9    Sinus tachycardia R00.0    Confusion R41.0    Seizure disorder (HCC) G40.909    Diabetes mellitus type II, uncontrolled ([facility]) E11.65    Dizziness R42    Neuropathy G62.9    Hyperammonemia ([facility]) E72.20    Medication overdose, insulin T50.901A    Suicide attempt ([facility]) T14.91XA    Hypoglycemia E16.2    Hypokalemia E87.6    Lithium toxicity T56.891A    Nausea & vomiting R11.2    Snoring R06.83    Hypogonadism [id number]    Erectile dysfunction due to diseases classified elsewhere N52.1    Rash R21    Headaches due to old head injury G44.309, S09.90XS    Abdominal wall pain in left lower quadrant R10.32    Bipolar affective disorder ([address] F31.9    Hypogonadism in male E29.1    Type 2 diabetes mellitus with diabetic neuropathy (HCC) E11.40    Hypomagnesemia E83.42    Partial seizure ([address] R56.9    Syncope and collapse R55    Headache disorder R51.9    Partial symptomatic epilepsy with complex partial seizures, not intractable, without status epilepticus ([address] G40.209    Respiratory acidosis E87.2    Essential hypertension I10    Gastritis K29.70    Gastroesophageal reflux disease K21.9    Mixed hyperlipidemia E78.2    Acute lateral meniscus tear of right knee Y92.315F    Recurrent right inguinal hernia K40.91    Bicipital tendinitis of right shoulder M75.21    Carpal tunnel syndrome of right wrist G56.01    Obesity (BMI 30-39. 9) E66.9    Lump of axilla R22.30    Familial hypercholesterolemia E78.01    Coronary artery disease of native artery of native heart with stable angina pectoris ([facility]) I25.118    Exertional dyspnea R06.00    Vitamin D deficiency E55.9    Seizures ([facility]) R56.9    Nausea and vomiting R11.2    Type II diabetes mellitus with manifestations, uncontrolled ([facility]) E11.8, E11.65    Chondromalacia of knee, right M94.261    Effusion of left knee M25.462    Elevated levels of transaminase & lactic acid dehydrogenase R74.01, R74.02    Other intervertebral disc degeneration, lumbar region M51.36    Palpitations R00.2    Splenomegaly R16.1    Sprain of right foot S93.601A    Steatohepatitis K75.81    Osteopenia M85.80    Obstructive sleep apnea J54.22    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 Senthil Grande. He was stable at time of discharge, Sukh Moore was tolerating a soft diet. , having bowel movements, ambulating on his own accord and had appropriate analgesia on oral pain medications. Patient had no signs or symptoms of complications. The patient has had difficulty with PO intake since surgery due to pain, but has progressively improving the last few days. He reports his pain is currently a 7/10. Hospitalist have corrected his potassium and is ok with discharge today per nursing. PHYSICAL EXAMINATION     Discharge Vitals:  height is 6' 2\" (1.88 m) and weight is 237 lb 6.4 oz (107.7 kg).  His oral temperature is 98.7 °F (37.1 °C). His blood pressure is 125/82 and his pulse is 67. His respiration is 14 and oxygen saturation is 99%. General appearance - alert, well appearing, and in no distress. Flat affect during exam  Chest - no tachypnea, retractions or cyanosis  Heart - normal rate and regular rhythm  Incision - healing well, no drainage    Nose:   Septum:  normal  Mucosa:  clear  Turbinates: normal and pink            Discharge:  none    Dentition: Edentulous upper with poor lower dentition, no malocclusion  Oral mucosa: moist  Tonsils: absent with normal post-operative appearance. No evidence of recent bleeding. Oropharynx: normal-appearing mucosa. NO bloody post nasal drainage noted  Hard and soft palates: Soft palate exam is consistent with recent UPPP. Incision well approximated without evidence of bleeding  Uvula midline. Gag reflex present    Neck: Trachea midline. Neck incision from thyroidectomy is well approximated without signs of infection. No palpable crepitus. No palpable masses or tenderness  Lymphatic: No cervical lymphadenopathy noted. Eyes: JENNA, EOM intact. Conjunctiva moist without discharge. Lungs: Normal effort of breathing, not obviously distressed.     LABS     Recent Labs     01/18/21  0410 01/19/21  0424 01/19/21  1510 01/20/21  0321   WBC 6.1 6.5  --  7.2   HGB 14.1 14.7  --  15.3   HCT 39.5* 41.3*  --  43.2    228  --  249    140  --  140   K 3.3* 2.9* 3.6 3.8    106  --  107   CO2 25 26  --  23   BUN 6* 7  --  7   CREATININE 0.4 0.5  --  0.5       DISCHARGE INSTRUCTIONS     Discharge Medications:      Medication List      START taking these medications    ketorolac 10 MG tablet  Commonly known as: TORADOL  Take 1 tablet by mouth every 6 hours as needed for Pain Alternate with Percocet     lidocaine viscous hcl 2 % Soln solution  Commonly known as: XYLOCAINE  Take 10 mLs by mouth every 3 hours as needed for Irritation or Pain Gargle in the back of the throat and then spit it out. Do this before eating or taking medications to make it more comfortable to swallow. oxyCODONE-acetaminophen 7.5-325 MG per tablet  Commonly known as: Percocet  Take 1 tablet by mouth every 6 hours as needed for Pain for up to 3 days. Reduce frequency of medication as pain becomes more tolerable.         CONTINUE taking these medications    atorvastatin 10 MG tablet  Commonly known as: LIPITOR  TAKE 1 TABLET BY MOUTH ONE TIME A DAY     divalproex 500 MG DR tablet  Commonly known as: DEPAKOTE     DULoxetine 60 MG extended release capsule  Commonly known as: CYMBALTA     empagliflozin 25 MG tablet  Commonly known as: Jardiance  Take 25 mg by mouth daily     fenofibrate 145 MG tablet  Commonly known as: Tricor  Take 1 tablet by mouth daily     gabapentin 600 MG tablet  Commonly known as: NEURONTIN     HumaLOG KwikPen 100 UNIT/ML Sopn  Generic drug: insulin lispro (1 Unit Dial)     Lantus SoloStar 100 UNIT/ML injection pen  Generic drug: insulin glargine     levETIRAcetam 500 MG tablet  Commonly known as: KEPPRA     LORazepam 0.5 MG tablet  Commonly known as: ATIVAN     Melatonin 10 MG Tabs     metoprolol tartrate 50 MG tablet  Commonly known as: LOPRESSOR  Take 1.5 tablets by mouth 2 times daily     pantoprazole 40 MG tablet  Commonly known as: Protonix  Take 1 tablet by mouth 2 times daily     promethazine 12.5 MG tablet  Commonly known as: PHENERGAN  Take 1-2 tablets by mouth every 8 hours as needed for Nausea     rOPINIRole 1 MG tablet  Commonly known as: REQUIP  TAKE 1 TABLET BY MOUTH THREE TIMES A DAY     sildenafil 100 MG tablet  Commonly known as: Viagra  Take 1 tablet by mouth as needed for Erectile Dysfunction     Vimpat 100 MG Tabs tablet  Generic drug: lacosamide     Vitamin B-12 2500 MCG Subl  Place 1 tablet under the tongue daily     vitamin D 1.25 MG (29134 UT) Caps capsule  Commonly known as: ERGOCALCIFEROL  Take 1 capsule by mouth once a week     zonisamide 100 MG capsule  Commonly known as: Charlee Lucius        STOP taking these medications    lisinopril 10 MG tablet  Commonly known as: PRINIVIL;ZESTRIL           Where to Get Your Medications      These medications were sent to [facility] [address] - [place], [address] [facility] [phone] [address], [facility] [address]    Phone: [phone]   · ketorolac 10 MG tablet  · lidocaine viscous hcl 2 % Soln solution  · oxyCODONE-acetaminophen 7.5-325 MG per tablet       Diet: diet as tolerated. I discussed the importance of the patient continuing to increase his PO intake of food, water, and medications. I point to the patient that he could very likely come back to the hospital with DKA, dehydration, or seizure if he does not continue to take p.o. Activity: activity as tolerated  Wound Care: Daily and as needed  Follow-up:  Follow up with Dr Charlotte Urias on 1/27/21 at 3:45pm  Time Spent for discharge: 20 minutes     Addendum 1/20/21, 6693: Spoke with hospitalist regarding patient's liver labs and studies. Moderately elevated alk phos today with mild elevation in AST and ALT. Decided to hold off on discharging patient today and await hepatitis panel results. Liver ultrasound shows \"somewhat limited evaluation does not demonstrate any gross abnormality of the liver. \" No specific source for lab abnormalities found, especially with patient history of previous cholecystectomy. Hepatitis panel has been ordered, but results pending. Discharge order will remain active at this time, but will wait to officially discharge. If no significant abnormality seen on hepatitis panel, will likely plan for outpatient GI follow up.      Electronically signed by ASHLEY Retana on 1/20/2021 at 2:22 PM

## 2021-01-20 NOTE — PROGRESS NOTES
Hospitalist Progress Note    Patient:  Otelia Channel      Unit/Bed:4K-24/024-A    YOB: 1970    MRN: [id number]       Acct: [de-identified]     PCP: Lex Gusman MD    Date of Admission: 1/15/2021    Assessment/Plan:    Acute transaminitis - Unclear etiology. Alk phos=228, repeat 220, AST=78, repeat 74, ALT=75, repeat 71. Patient has not been hypotensive to indicate ischemic causes nor had any direct hepatic injury from new medications. Patient denies abdominal pain however this may be masked by pain medication. Patient has had cholecystectomy in 2004. 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  Patient tolerated surgery well.  ENT attending/managing. 1/16: pt not taking PO in any significant capacity, encouraged use of viscous lidocaine  1/17: continuing to encourage PO intake  1/20/21 - Patient has increased PO intake over last 24 hours, however he is still being encouraged to continue. Seizure disorder: Complex history with dynamic seizure medication dosing per primary.  Continue home doses of Depakote, Keppra, zonisamide, and lacosamide.  Continue to monitor closely, maintain seizure precautions. 1/16: pt refusing to take PO, discussed the need for this with him, but in the meantime give IV vimpat and keppra - no IV formulation of Zonegran available (pt on depakote for mood, not sz)  1/17: did not take zonegran last night, encouraging he do so tonight and continue with IV for now  1/18: says he will \"try\" to take Rosan Feli, did feel like he had \"semi seizure\" early this AM but is fine now  1/19: did not take Zonegran, we continue to encourage PO intake. 1/20/21: Patient was able to take all medication this morning. PO intake was encouraged and he was counseled on importance of compliance.     Type 2 diabetes mellitus: Hemoglobin A1c 9.3 on 12/22/2020. Wilfredo Barclayon blood glucose level at 225.  Start Accu-Cheks before meals and at bedtime with sliding scale insulin coverage (low-dose corrective algorithm).  Hold Lantus and additional mealtime coverage insulin until patient is able to tolerate more substantial diet; continue Jardiance.  Hypoglycemia protocol in place, maintain carb controlled diet when cleared by primary. 1/16: resume lantus at 50 units BID and monitor, continue with just a SSI, not his typical high dose of mealtime coverage for now  1/17: glucose stable so far, continue to adjust as PO intake increases    Essential hypertension: History, not at goal. Willma Person secondary to pain response.  Continue metoprolol.  Labetalol available PRN for SBP >165. 1/16: as above, not taking PO, trying to encourage this    Hyperlipidemia: Noted. Continue fenofibrate and atorvastatin. GERD: Continue Protonix. Depression, bipolar disorder: Continue Cymbalta. Restless leg syndrome: Continue Requip. Sleep apnea: History, noted. Obesity: BMI 32.46        Expected discharge date:  1/21/21    Disposition:    [x] Home       [] TCU       [] Rehab       [] Psych       [] SNF       [] [place]       [] Other-    Chief Complaint: Thyroid [address] Course: \"Nicolas Sow is a 20-year-old  male, lifetime non-smoker, with a medical history of hyperlipidemia, hypertension, restless leg syndrome, chronic back pain, irritable bowel syndrome, seizure disorder, neuropathy, type 2 diabetes mellitus, anemia, chronic bronchitis, acid reflux, liver disease, depression, bipolar disorder, sleep apnea, pancreatic insufficiency, systolic dysfunction, and obesity. Patient presented to [facility] for a thyroid lobectomy. Ekaterina Pat has a history of a follicular neoplasm of the thyroid and Dr. Inez Olivo the thyroid lobectomy on 1/15/2021, the patient tolerated the procedure well.  Postoperatively, the patient remained hemodynamically stable and was transitioned to the stepdown unit.  The hospitalist service was consulted for medical management. \"    1/16/21: IV seizure medications for now and monitor   1/17/21: continue with IV medications and encouraging PO intake  1/19/21: continue to encourage PO intake, hopeful discharge tomorrow    Subjective (past 24 hours):  Patient had flat affect during examination today. He did not seem to have much interest in discussing his plan of care and continued complaint of mouth pain. He stated that it is still troublesome for him to eat despite current pain medication. The patient had elevated liver enzymes      ROS (12 point review of systems completed. Pertinent positives noted. Otherwise ROS is negative).     Medications:  Reviewed    Infusion Medications    dextrose       Scheduled Medications    levETIRAcetam  2,000 mg Oral BID    lacosamide  300 mg Oral BID    metoprolol tartrate  75 mg Oral BID    insulin glargine  35 Units Subcutaneous BID    insulin lispro  0-18 Units Subcutaneous TID WC    insulin lispro  0-9 Units Subcutaneous Nightly    neomycin-bacitracin-polymyxin   Topical 3 times per day    atorvastatin  10 mg Oral Daily    vitamin B-12  2,500 mcg Oral Daily    divalproex  500 mg Oral BID    DULoxetine  120 mg Oral Daily    empagliflozin  25 mg Oral Daily    [Held by provider] fenofibrate  160 mg Oral Daily    [Held by provider] insulin lispro  30 Units Subcutaneous TID AC    pantoprazole  40 mg Oral BID    rOPINIRole  1 mg Oral TID    zonisamide  400 mg Oral Nightly     PRN Meds: potassium chloride **OR** potassium alternative oral replacement **OR** potassium chloride, magic (miracle) mouthwash, LORazepam, glucose, dextrose, glucagon (rDNA), dextrose, labetalol, promethazine, HYDROcodone-acetaminophen **OR** oxyCODONE-acetaminophen **OR** oxyCODONE-acetaminophen, ondansetron      Intake/Output Summary (Last 24 hours) at 1/20/2021 1303  Last data filed at 1/20/2021 0800  Gross per 24 hour   Intake 1998.75 ml   Output 1625 ml   Net 373.75 ml 39.5* 41.3* 43.2    228 249     Recent Labs     01/18/21  0410 01/19/21  0424 01/19/21  1510 01/20/21  0321    140  --  140   K 3.3* 2.9* 3.6 3.8    106  --  107   CO2 25 26  --  23   BUN 6* 7  --  7   CREATININE 0.4 0.5  --  0.5   CALCIUM 8.3* 8.8  --  9.0     Recent Labs     01/18/21  0410 01/19/21  0424 01/20/21  0321   AST 13 19 74*  78*   ALT 18 22 71*  75*   BILIDIR  --   --  0.4*   BILITOT 1.4* 1.3* 1.7*  1.7*   ALKPHOS 56 79 220*  228*     No results for input(s): INR in the last 72 hours. No results for input(s): Fernández Ronald in the last 72 hours.     Microbiology:      Urinalysis:      Lab Results   Component Value Date    NITRU NEGATIVE 11/15/2020    [address] NONE 08/12/2019    WBCUA 6-10 10/17/2015    BACTERIA NONE 08/12/2019    RBCUA NONE 08/12/2019    BLOODU NEGATIVE 11/15/2020    SPECGRAV >1.030 08/12/2019    GLUCOSEU >= 1000 11/15/2020       Radiology:  No orders to display       DVT prophylaxis: [] Lovenox                                 [x] SCDs                                 [] SQ Heparin                                 [] Encourage ambulation           [] Already on Anticoagulation     Code Status: Full Code      Tele:   [x] yes             [] no    Electronically signed by Arelis Mcneal DO on 1/20/2021 at 1:03 PM

## 2021-01-21 ENCOUNTER — APPOINTMENT (OUTPATIENT)
Dept: MRI IMAGING | Age: 51
DRG: 625 | End: 2021-01-21
Attending: OTOLARYNGOLOGY
Payer: MEDICARE

## 2021-01-21 LAB
ALBUMIN SERPL-MCNC: 4 G/DL (ref 3.5–5.1)
ALP BLD-CCNC: 416 U/L (ref 38–126)
ALT SERPL-CCNC: 243 U/L (ref 11–66)
ANION GAP SERPL CALCULATED.3IONS-SCNC: 14 MEQ/L (ref 8–16)
AST SERPL-CCNC: 426 U/L (ref 5–40)
BILIRUB SERPL-MCNC: 2.8 MG/DL (ref 0.3–1.2)
BILIRUBIN DIRECT: 0.5 MG/DL (ref 0–0.3)
BUN BLDV-MCNC: 12 MG/DL (ref 7–22)
CALCIUM SERPL-MCNC: 9.1 MG/DL (ref 8.5–10.5)
CHLORIDE BLD-SCNC: 104 MEQ/L (ref 98–111)
CO2: 22 MEQ/L (ref 23–33)
CREAT SERPL-MCNC: 0.5 MG/DL (ref 0.4–1.2)
ERYTHROCYTE [DISTWIDTH] IN BLOOD BY AUTOMATED COUNT: 12.8 % (ref 11.5–14.5)
ERYTHROCYTE [DISTWIDTH] IN BLOOD BY AUTOMATED COUNT: 39.5 FL (ref 35–45)
GFR SERPL CREATININE-BSD FRML MDRD: > 90 ML/MIN/1.73M2
GLUCOSE BLD-MCNC: 100 MG/DL (ref 70–108)
GLUCOSE BLD-MCNC: 107 MG/DL (ref 70–108)
GLUCOSE BLD-MCNC: 115 MG/DL (ref 70–108)
GLUCOSE BLD-MCNC: 78 MG/DL (ref 70–108)
GLUCOSE BLD-MCNC: 80 MG/DL (ref 70–108)
GLUCOSE BLD-MCNC: 90 MG/DL (ref 70–108)
HCT VFR BLD CALC: 46.7 % (ref 42–52)
HEMOGLOBIN: 16.6 GM/DL (ref 14–18)
INR BLD: 1.21 (ref 0.85–1.13)
MCH RBC QN AUTO: 30.7 PG (ref 26–33)
MCHC RBC AUTO-ENTMCNC: 35.5 GM/DL (ref 32.2–35.5)
MCV RBC AUTO: 86.5 FL (ref 80–94)
PLATELET # BLD: 278 THOU/MM3 (ref 130–400)
PMV BLD AUTO: 9.2 FL (ref 9.4–12.4)
POTASSIUM REFLEX MAGNESIUM: 4 MEQ/L (ref 3.5–5.2)
RBC # BLD: 5.4 MILL/MM3 (ref 4.7–6.1)
REASON FOR REJECTION: NORMAL
REJECTED TEST: NORMAL
SODIUM BLD-SCNC: 140 MEQ/L (ref 135–145)
TOTAL PROTEIN: 7.3 G/DL (ref 6.1–8)
WBC # BLD: 6.4 THOU/MM3 (ref 4.8–10.8)

## 2021-01-21 PROCEDURE — 94760 N-INVAS EAR/PLS OXIMETRY 1: CPT

## 2021-01-21 PROCEDURE — 6360000002 HC RX W HCPCS: Performed by: STUDENT IN AN ORGANIZED HEALTH CARE EDUCATION/TRAINING PROGRAM

## 2021-01-21 PROCEDURE — 6360000002 HC RX W HCPCS: Performed by: PHYSICIAN ASSISTANT

## 2021-01-21 PROCEDURE — 82948 REAGENT STRIP/BLOOD GLUCOSE: CPT

## 2021-01-21 PROCEDURE — 85610 PROTHROMBIN TIME: CPT

## 2021-01-21 PROCEDURE — 80053 COMPREHEN METABOLIC PANEL: CPT

## 2021-01-21 PROCEDURE — 82248 BILIRUBIN DIRECT: CPT

## 2021-01-21 PROCEDURE — 85027 COMPLETE CBC AUTOMATED: CPT

## 2021-01-21 PROCEDURE — 6370000000 HC RX 637 (ALT 250 FOR IP): Performed by: PHYSICIAN ASSISTANT

## 2021-01-21 PROCEDURE — 36415 COLL VENOUS BLD VENIPUNCTURE: CPT

## 2021-01-21 PROCEDURE — 6370000000 HC RX 637 (ALT 250 FOR IP): Performed by: OTOLARYNGOLOGY

## 2021-01-21 PROCEDURE — 6370000000 HC RX 637 (ALT 250 FOR IP): Performed by: INTERNAL MEDICINE

## 2021-01-21 PROCEDURE — 99233 SBSQ HOSP IP/OBS HIGH 50: CPT | Performed by: INTERNAL MEDICINE

## 2021-01-21 PROCEDURE — 2060000000 HC ICU INTERMEDIATE R&B

## 2021-01-21 RX ORDER — LORAZEPAM 2 MG/ML
0.5 INJECTION INTRAMUSCULAR ONCE
Status: COMPLETED | OUTPATIENT
Start: 2021-01-21 | End: 2021-01-21

## 2021-01-21 RX ORDER — ONDANSETRON 2 MG/ML
INJECTION INTRAMUSCULAR; INTRAVENOUS
Status: DISPENSED
Start: 2021-01-21 | End: 2021-01-22

## 2021-01-21 RX ADMIN — LACOSAMIDE 300 MG: 200 TABLET ORAL at 10:11

## 2021-01-21 RX ADMIN — BACITRACIN ZINC NEOMYCIN SULFATE POLYMYXIN B SULFATE: 400; 3.5; 5 OINTMENT TOPICAL at 13:42

## 2021-01-21 RX ADMIN — METOPROLOL TARTRATE 75 MG: 25 TABLET ORAL at 10:07

## 2021-01-21 RX ADMIN — ROPINIROLE 1 MG: 1 TABLET, FILM COATED ORAL at 10:07

## 2021-01-21 RX ADMIN — DULOXETIN HYDROCHLORIDE 120 MG: 60 CAPSULE, DELAYED RELEASE ORAL at 10:07

## 2021-01-21 RX ADMIN — OXYCODONE HYDROCHLORIDE AND ACETAMINOPHEN 2 TABLET: 5; 325 TABLET ORAL at 03:10

## 2021-01-21 RX ADMIN — ONDANSETRON 4 MG: 2 INJECTION INTRAMUSCULAR; INTRAVENOUS at 17:24

## 2021-01-21 RX ADMIN — PANTOPRAZOLE SODIUM 40 MG: 40 TABLET, DELAYED RELEASE ORAL at 20:19

## 2021-01-21 RX ADMIN — ROPINIROLE 1 MG: 1 TABLET, FILM COATED ORAL at 20:20

## 2021-01-21 RX ADMIN — LACOSAMIDE 300 MG: 200 TABLET ORAL at 20:17

## 2021-01-21 RX ADMIN — ZONISAMIDE 400 MG: 100 CAPSULE ORAL at 20:20

## 2021-01-21 RX ADMIN — DIVALPROEX SODIUM 500 MG: 500 TABLET, DELAYED RELEASE ORAL at 10:11

## 2021-01-21 RX ADMIN — METOPROLOL TARTRATE 75 MG: 25 TABLET ORAL at 20:18

## 2021-01-21 RX ADMIN — DIVALPROEX SODIUM 500 MG: 500 TABLET, DELAYED RELEASE ORAL at 20:15

## 2021-01-21 RX ADMIN — LEVETIRACETAM 2000 MG: 500 TABLET ORAL at 20:17

## 2021-01-21 RX ADMIN — LORAZEPAM 0.5 MG: 2 INJECTION INTRAMUSCULAR; INTRAVENOUS at 17:19

## 2021-01-21 RX ADMIN — PANTOPRAZOLE SODIUM 40 MG: 40 TABLET, DELAYED RELEASE ORAL at 10:07

## 2021-01-21 RX ADMIN — LEVETIRACETAM 2000 MG: 500 TABLET ORAL at 10:11

## 2021-01-21 RX ADMIN — OXYCODONE HYDROCHLORIDE AND ACETAMINOPHEN 1 TABLET: 5; 325 TABLET ORAL at 09:10

## 2021-01-21 RX ADMIN — BACITRACIN ZINC NEOMYCIN SULFATE POLYMYXIN B SULFATE: 400; 3.5; 5 OINTMENT TOPICAL at 05:14

## 2021-01-21 RX ADMIN — BACITRACIN ZINC NEOMYCIN SULFATE POLYMYXIN B SULFATE: 400; 3.5; 5 OINTMENT TOPICAL at 20:24

## 2021-01-21 ASSESSMENT — PAIN DESCRIPTION - PROGRESSION: CLINICAL_PROGRESSION: NOT CHANGED

## 2021-01-21 ASSESSMENT — PAIN - FUNCTIONAL ASSESSMENT: PAIN_FUNCTIONAL_ASSESSMENT: ACTIVITIES ARE NOT PREVENTED

## 2021-01-21 ASSESSMENT — PAIN DESCRIPTION - DESCRIPTORS: DESCRIPTORS: HEADACHE

## 2021-01-21 NOTE — CONSULTS
Consult History & Physical      Patient:  Julian Magana  YOB: 1970  MRN: [id number]     Acct: [de-identified]    Chief Complaint:  Thyroid cancer    Date of Service: Pt seen/examined in consultation on 1/21/2021    History Of Present Illness:      48 y.o. male who we are asked to see/evaluate by Maria Antonia De La Cruz MD for medical management of elevated LFTs. 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 He underwent thyroid lobectomy 01/15/21, by Dr. Jhonatan Heller. He complains of a lot of throat pain and is taking minimal amounts of PO intake. Noted to have elevated LFTs that have worsened. Most recent: albumin 4.0, alk phos 416, ALT//426, bilirubin 2.8, total protein 7.3. INR 1.21. Hepatitis panel negative. He denies past history of hepatitis infection or liver disease. He denies abdominal pain, nausea, vomiting, diarrhea, constipation, melena, and hematochezia. Denies history of abdominal surgeries. Denies family history of GI cancers. US liver was somewhat limited, but was negative. Last EGD 10/22/19 demonstrated GERD with distal esophagitis, esophageal scarring post dilation, he was dilated with a 54 Fr dilator. Last colonoscopy 2012 was negative. He was to have a repeat colonoscopy in 2019, but cancelled it.     Past Medical History:    Past Medical History:   Diagnosis Date    Abnormal thyroid biopsy     Acid reflux     Anemia     resolved - previously seen by Dr. Stefania Bradshaw (due to enlarged spleen)    Anesthesia     seizures with brain surgery due to blood sugar got really high    Bipolar disorder ([facility] [address]     Blood clot in vein 04/07/2016    [facility]     Cancer [facility]) 04/2019    thyroid    Chest pain     previously seeing Rosa Polanco cardiologist, now seeing Dr. Radha Tran (LAD bridging on cath 4/2016)    Chronic back pain     Chronic bronchitis ([facility] [address]     Dr. Shiv Nelson Colon polyp 08/30/2016    Depression     seeing Zackery Cunningham DVT (deep venous thrombosis) ([facility] [address] [phone]    not on Coumadin due to unable to regulate - Dr. Noelle Pablo - no etiology found per patient    Esophageal abnormality     nodule     Fatty liver disease, nonalcoholic     U/S [id number]/8305 [facility]    Furuncle     legs    History of Doppler ultrasound 05/29/2011    No hemodynamically significant carotid stenosis is identified. A thyroid nodule on each side. Dedicated ultrasound of thyroid gland is suggested to further evaluate if clinically indicated.  History of pulmonary embolism 2007    s/p GFF, related to knee surgery    Hx of blood clots     leg and lung    Hyperlipidemia     severely elevated triglycerides    Hypertension     diastolic    Intracranial arachnoid cyst 11/2012    Dr. Channing Dahl drained, complicated with seizures, DKA    Irritable bowel syndrome     Liver disease     Katelyn Ardon - elevated LFT - positive smooth muscle antibody, steatosis per liver bx 3/2014 with Dr. Margaret Ocampo (multiple drug resistant organisms) resistance 2012    MRSA (methicillin resistant staph aureus) culture positive     h/o in foot and before brain surgery    Nephrolithiasis     noted on CT abdomen 9/2016, 6/2019    Neuropathy     Pancreatic insufficiency     Positive SANDY (antinuclear antibody)     Dr. Keke Jerez - first visit in 6/2013    Prolonged emergence from general anesthesia     Restless legs syndrome     Seizures ([facility] [address]     started with brain surgery    Sinus tachycardia     Holter 3/2013 - seeing Dr. Chelsey De La Paz fracture [facility])     clips     Sleep apnea     positive sleep apnea per study 10/2020.  Systolic dysfunction     \"systolic bridge\"  EF 83% ECHO 9/2019    Type II or unspecified type diabetes mellitus without mention of complication, not stated as uncontrolled 2007       Home Medications:  Prior to Admission medications    Medication Sig Start Date End Date Taking?  Authorizing Provider   oxyCODONE-acetaminophen (PERCOCET) 7.5-325 MG per tablet Take 1 tablet by (CYMBALTA) 60 MG extended release capsule Take by mouth daily 2 tab   Yes Historical Provider, MD   lacosamide (VIMPAT) 100 MG TABS tablet Take by mouth 2 times daily. 3 tab   Yes Historical Provider, MD   divalproex (DEPAKOTE) 500 MG DR tablet Take 500 mg by mouth 2 times daily  4/5/18  Yes Historical Provider, MD   levETIRAcetam (KEPPRA) 500 MG tablet Take by mouth 2 times daily 4 tab   Yes Historical Provider, MD   zonisamide (ZONEGRAN) 100 MG capsule Take by mouth nightly 4 tab   Yes Historical Provider, MD   promethazine (PHENERGAN) 12.5 MG tablet Take 1-2 tablets by mouth every 8 hours as needed for Nausea 9/5/19   Verneta Dancer, MD   Melatonin 10 MG TABS Take by mouth nightly 3 tab    Historical Provider, MD   sildenafil (VIAGRA) 100 MG tablet Take 1 tablet by mouth as needed for Erectile Dysfunction 1/29/19   Verneta Dancer, MD   LORazepam (ATIVAN) 0.5 MG tablet Take 0.5 mg by mouth 2 times daily as needed (seizures)     Historical Provider, MD       Surgical History:  Past Surgical History:   Procedure Laterality Date    CARDIAC CATHETERIZATION      2011,5-6 years ago   Rito Merchant  3-2012   Rito Merchant  04/28/2016    [facility]     CARPAL TUNNEL RELEASE  01/2017    CHOLECYSTECTOMY  2004    COLONOSCOPY  2012    COLONOSCOPY  08/2016    2 tubular adenomas - reportedly needs repeat scope in 6 months    CRANIOTOMY  11/2012    arachnoid cyst drainage    EKG 12-LEAD  10/18/2015         ENDOSCOPY, COLON, DIAGNOSTIC      HERNIA REPAIR Right 1996    [facility]--Dr. José Torrez INGUINAL HERNIA REPAIR Right 09/15/2016    Robotic assisted    KNEE ARTHROSCOPY Right 2016    KNEE SURGERY Left 2007    acl and debrided twice    OTHER SURGICAL HISTORY  5-31-11    Tilt table was associated w/ nonspecific symptoms or nausea, otherwise no significant dizziness or syncope. No significant hemodynamic changes. Otherwise, unremarkable tilt table test after 30 minutes of tilting.      OTHER SURGICAL HISTORY  01/20/2017    RIGHT SHOULDER ARTHROSCOPY, OPEN STAN, OPEN ACROMIOPLASTY, BICEP TENDESIS, RIGHT CARPAL TUNNEL RELEASE    SHOULDER SURGERY Right 01/2007    THYROID LOBECTOMY N/A 1/15/2021    THYROID LOBECTOMY, UVULOPALATOPHARYNGOPLAST LAOP performed by Ling Harris MD at [address] [facility] ECHOCARDIOGRAM  3-05-11    LV size and systolic function normal. EF 55-65%.  UPPER GASTROINTESTINAL ENDOSCOPY  2012    UPPER GASTROINTESTINAL ENDOSCOPY  2016    Dr. Zuleika Quiroz Left 10/22/2019    EGD DILATION SAVORY performed by Chary Landers MD at [address] [facility] Left 10/22/2019    EGD BIOPSY performed by Chary Landers MD at [address]  2007       Family History:  Family History   Problem Relation Age of Onset    Heart Disease Father 61        MI    Stroke Brother     Obesity Brother     Arthritis Mother     Seizures Mother     Obesity Sister     Other Brother         pulmonary embolism    Diabetes Daughter     Seizures Brother        Past GI History:  GERD, IBS-D, esophageal dilation, EGD, colonoscopy    Allergies:  Ciprofloxacin-ciproflox hcl er, Heparin, Metformin, Niacin and related, Tramadol hcl, Ultram [tramadol], and Warfarin    Social History:   TOBACCO:   reports that he has never smoked. He has never used smokeless tobacco.  ETOH:   reports no history of alcohol use. Review Of Systems  GENERAL: No fever, chills or weight loss. EYES:  No  blurred vision, double vision   CARDIOVASCULAR: No chest pain or palpitations. RESPIRATORY:  No dyspnea or cough. GI:  See HPI  MUSCULOSKELETAL: No new painful or swollen joints or myalgias. :   No dysuria or hematuria. SKIN:  No rashes or jaundice. NEUROLOGIC:  No headaches or seizures, numbness or tingling of arms, or legs. PSYCH:  No anxiety or depression. ENDOCRINE:  No polyuria or polydipsia. BLOOD:  No anemia, bleeding disorder, blood or blood product transfusion. PHYSICAL EXAM:  /72   Pulse 74   Temp 98.5 °F (36.9 °C) (Oral)   Resp 17   Ht 6' 2\" (1.88 m)   Wt 237 lb 14.4 oz (107.9 kg)   SpO2 96%   BMI 30.54 kg/m²     General appearance: No apparent distress, appears stated age and cooperative. HEENT: Normal cephalic, atraumatic without obvious deformity. Pupils equal, round, and reactive to light. Neck: Supple, with full range of motion. No jugular venous distention. Trachea midline. Respiratory:  Normal respiratory effort. Clear to auscultation, bilaterally without Rales/Wheezes/Rhonchi. Cardiovascular: Regular rate and rhythm without murmurs, rubs or gallops. Abdomen: Soft, non-tender, non-distended with active bowel sounds. Musculoskeletal: No clubbing, cyanosis or edema bilaterally. Skin: Pink, warm, dry. No rashes or lesions. Psychiatric: Alert and oriented, thought content appropriate, flat affect    Labs:   Recent Labs     01/21/21  0308   WBC 6.4   HGB 16.6   HCT 46.7        Recent Labs     01/21/21  0308      K 4.0      CO2 22*   BUN 12   CREATININE 0.5   CALCIUM 9.1     Recent Labs     01/21/21  0308 01/21/21  1011   *  --    *  --    BILIDIR  --  0.5*   BILITOT 2.8*  --    ALKPHOS 416*  --      Recent Labs     01/21/21  1231   INR 1.21*      Ref. Range 1/20/2021 17:03   Hep A IgM Unknown Negative   Hepatitis B Surface Ag Unknown Negative   Hepatitis C Ab Unknown Negative   Hep B Core Ab, IgM Unknown Negative       Radiology:   US liver 01/20/21      Impression   Somewhat limited evaluation does not demonstrate any gross abnormality of the liver     Code Status: Full Code    ASSESSMENT:  1. Follicular neoplasm of the thyroid s/p thyroid lobectomy 01/15/21  2. Elevated LFTs  3. Seizure disorder  4. DM  5. H/O HTN  6. GERD  7. HLD  8.  Depression, bipolar disorder    PLAN:     Monitor hepatic labs   Avoid hepatotoxic meds   Cancel CT, MRCP   Diet as tolerated, NPO at midnight for MRCP   Pain control per primary   Continue PO PPI BID, home dose   Supportive care per primary team  Will follow       Case reviewed and impression/plan reviewed in collaboration with Dr. Maurizio Forman  Electronically signed by Terence Snellen, APRN - CNP on 1/21/2021 at 2:32 PM    [facility]  Thank you for the consultation.

## 2021-01-21 NOTE — PROGRESS NOTES
with IV for now  1/18: says he will \"try\" to take zonegran tonight, did feel like he had \"semi seizure\" early this AM but is fine now  1/19: did not take Zonegran, we continue to encourage PO intake. 1/20/21: Patient was able to take all medication this morning. PO intake was encouraged and he was counseled on importance of compliance. Type 2 diabetes mellitus: Hemoglobin A1c 9.3 on 12/22/2020. Julius Castillo blood glucose level at 225.  Start Accu-Cheks before meals and at bedtime with sliding scale insulin coverage (low-dose corrective algorithm).  Hold Lantus and additional mealtime coverage insulin until patient is able to tolerate more substantial diet; continue Jardiance.  Hypoglycemia protocol in place, maintain carb controlled diet when cleared by primary. 1/16: resume lantus at 50 units BID and monitor, continue with just a SSI, not his typical high dose of mealtime coverage for now  1/17: glucose stable so far, continue to adjust as PO intake increases    Essential hypertension: History, not at goal. Alpha Dakin secondary to pain response.  Continue metoprolol.  Labetalol available PRN for SBP >165. 1/16: as above, not taking PO, trying to encourage this    Hyperlipidemia: Noted. Continue fenofibrate and atorvastatin. GERD: Continue Protonix. Depression, bipolar disorder: Continue Cymbalta. Restless leg syndrome: Continue Requip. Sleep apnea: History, noted.   Obesity: BMI 32.46        Expected discharge date:  1/21/21    Disposition:    [x] Home       [] TCU       [] Rehab       [] Psych       [] SNF       [] [place]       [] Other-    Chief Complaint: Thyroid [address] Course: \"Nicolas Sow is a 51-year-old  male, lifetime non-smoker, with a medical history of hyperlipidemia, hypertension, restless leg syndrome, chronic back pain, irritable bowel syndrome, seizure disorder, neuropathy, type 2 diabetes mellitus, anemia, chronic bronchitis, acid reflux, liver disease, depression, bipolar disorder, sleep apnea, pancreatic insufficiency, systolic dysfunction, and obesity. Patient presented to [facility] for a thyroid lobectomy. August Jauregui has a history of a follicular neoplasm of the thyroid and Dr. Streeter Harm the thyroid lobectomy on 1/15/2021, the patient tolerated the procedure well.  Postoperatively, the patient remained hemodynamically stable and was transitioned to the stepdown unit. The hospitalist service was consulted for medical management. \"    1/16/21: IV seizure medications for now and monitor   1/17/21: continue with IV medications and encouraging PO intake  1/19/21: continue to encourage PO intake, hopeful discharge tomorrow    1/20/21 - Patient had flat affect during examination today. He did not seem to have much interest in discussing his plan of care and continued complaint of mouth pain. He stated that it is still troublesome for him to eat despite current pain medication. The patient had elevated liver enzymes. Subjective (past 24 hours):  No acute events overnight per nursing staff. The patient is eager to be discharged today. Extensive discussion was made regarding his elevated liver enzymes and he appeared indifferent about the laboratory findings. I discussed with him the results of the ultrasound liver and again the patient had flat affect with no response. He denied having any questions or concerns at this time. Admits to continued mouth pain and denies bowel movement since admission. ROS (12 point review of systems completed. Pertinent positives noted. Otherwise ROS is negative).     Medications:  Reviewed    Infusion Medications    dextrose       Scheduled Medications    levETIRAcetam  2,000 mg Oral BID    lacosamide  300 mg Oral BID    metoprolol tartrate  75 mg Oral BID    insulin glargine  35 Units Subcutaneous BID    insulin lispro  0-18 Units Subcutaneous TID WC    insulin lispro  0-9 Units Subcutaneous sounds. Abdominal:      General: Bowel sounds are normal.      Palpations: Abdomen is soft. Musculoskeletal: Normal range of motion. Skin:     General: Skin is warm and dry. Capillary Refill: Capillary refill takes less than 2 seconds. Neurological:      General: No focal deficit present. Mental Status: He is alert and oriented to person, place, and time. Mental status is at baseline. GCS: GCS eye subscore is 4. GCS verbal subscore is 5. GCS motor subscore is 6. Psychiatric:         Attention and Perception: Attention and perception normal.         Mood and Affect: Mood normal. Affect is flat. Speech: Speech normal.         Behavior: Behavior normal.         Thought Content: Thought content normal.         Cognition and Memory: Cognition and memory normal.         Judgment: Judgment normal.             Labs:   Recent Labs     01/19/21  0424 01/20/21  0321 01/21/21  0308   WBC 6.5 7.2 6.4   HGB 14.7 15.3 16.6   HCT 41.3* 43.2 46.7    249 278     Recent Labs     01/19/21  0424 01/19/21  1510 01/20/21  0321 01/21/21  0308     --  140 140   K 2.9* 3.6 3.8 4.0     --  107 104   CO2 26  --  23 22*   BUN 7  --  7 12   CREATININE 0.5  --  0.5 0.5   CALCIUM 8.8  --  9.0 9.1     Recent Labs     01/19/21  0424 01/20/21  0321 01/20/21  1408 01/21/21  0308   AST 19 74*  78* 57* 426*   ALT 22 71*  75* 67* 243*   BILIDIR  --  0.4*  --   --    BILITOT 1.3* 1.7*  1.7*  --  2.8*   ALKPHOS 79 220*  228* 229* 416*     No results for input(s): INR in the last 72 hours. No results for input(s): Neldon Docker in the last 72 hours.     Microbiology:      Urinalysis:      Lab Results   Component Value Date    NITRU NEGATIVE 11/15/2020    WBCUA NONE 08/12/2019    WBCUA 6-10 10/17/2015    BACTERIA NONE 08/12/2019    RBCUA NONE 08/12/2019    BLOODU NEGATIVE 11/15/2020    SPECGRAV >1.030 08/12/2019    GLUCOSEU >= 1000 11/15/2020       Radiology:  US LIVER   Final Result   Somewhat

## 2021-01-21 NOTE — FLOWSHEET NOTE
Pt was alone and was having lots of pains. He could not talk but wanted prayer to heal. He was bless     01/21/21 9436   Encounter Summary   Services provided to: Patient   Referral/Consult From: [address] [place] [address] Road Visiting Yes  (1/21 )   Complexity of Encounter Low   Length of Encounter 15 minutes   Routine   Type Follow up   Assessment Approachable; Anxious   Intervention Nurtured hope;Prayer;[place]; Active listening;Empowerment   Outcome Acceptance;Expressed gratitude;Encouraged; Hopeful   ed.

## 2021-01-22 ENCOUNTER — APPOINTMENT (OUTPATIENT)
Dept: CT IMAGING | Age: 51
DRG: 625 | End: 2021-01-22
Attending: OTOLARYNGOLOGY
Payer: MEDICARE

## 2021-01-22 ENCOUNTER — TELEPHONE (OUTPATIENT)
Dept: PULMONOLOGY | Age: 51
End: 2021-01-22

## 2021-01-22 LAB
ALBUMIN SERPL-MCNC: 4 G/DL (ref 3.5–5.1)
ALP BLD-CCNC: 398 U/L (ref 38–126)
ALT SERPL-CCNC: 191 U/L (ref 11–66)
ANION GAP SERPL CALCULATED.3IONS-SCNC: 19 MEQ/L (ref 8–16)
AST SERPL-CCNC: 92 U/L (ref 5–40)
BILIRUB SERPL-MCNC: 1.8 MG/DL (ref 0.3–1.2)
BUN BLDV-MCNC: 17 MG/DL (ref 7–22)
CALCIUM SERPL-MCNC: 9.2 MG/DL (ref 8.5–10.5)
CHLORIDE BLD-SCNC: 100 MEQ/L (ref 98–111)
CO2: 19 MEQ/L (ref 23–33)
CREAT SERPL-MCNC: 0.6 MG/DL (ref 0.4–1.2)
ERYTHROCYTE [DISTWIDTH] IN BLOOD BY AUTOMATED COUNT: 13.1 % (ref 11.5–14.5)
ERYTHROCYTE [DISTWIDTH] IN BLOOD BY AUTOMATED COUNT: 41.6 FL (ref 35–45)
GFR SERPL CREATININE-BSD FRML MDRD: > 90 ML/MIN/1.73M2
GLUCOSE BLD-MCNC: 151 MG/DL (ref 70–108)
GLUCOSE BLD-MCNC: 152 MG/DL (ref 70–108)
GLUCOSE BLD-MCNC: 168 MG/DL (ref 70–108)
GLUCOSE BLD-MCNC: 170 MG/DL (ref 70–108)
GLUCOSE BLD-MCNC: 86 MG/DL (ref 70–108)
HCT VFR BLD CALC: 50.4 % (ref 42–52)
HEMOGLOBIN: 17.8 GM/DL (ref 14–18)
INR BLD: 1.15 (ref 0.85–1.13)
MCH RBC QN AUTO: 31.1 PG (ref 26–33)
MCHC RBC AUTO-ENTMCNC: 35.3 GM/DL (ref 32.2–35.5)
MCV RBC AUTO: 88 FL (ref 80–94)
PLATELET # BLD: 294 THOU/MM3 (ref 130–400)
PMV BLD AUTO: 9.6 FL (ref 9.4–12.4)
POTASSIUM REFLEX MAGNESIUM: 4.3 MEQ/L (ref 3.5–5.2)
RBC # BLD: 5.73 MILL/MM3 (ref 4.7–6.1)
SODIUM BLD-SCNC: 138 MEQ/L (ref 135–145)
TOTAL PROTEIN: 7.6 G/DL (ref 6.1–8)
VALPROIC ACID LEVEL: 32.8 UG/ML (ref 50–100)
WBC # BLD: 8.2 THOU/MM3 (ref 4.8–10.8)

## 2021-01-22 PROCEDURE — 70450 CT HEAD/BRAIN W/O DYE: CPT

## 2021-01-22 PROCEDURE — C9254 INJECTION, LACOSAMIDE: HCPCS | Performed by: STUDENT IN AN ORGANIZED HEALTH CARE EDUCATION/TRAINING PROGRAM

## 2021-01-22 PROCEDURE — 82948 REAGENT STRIP/BLOOD GLUCOSE: CPT

## 2021-01-22 PROCEDURE — 74177 CT ABD & PELVIS W/CONTRAST: CPT

## 2021-01-22 PROCEDURE — 99232 SBSQ HOSP IP/OBS MODERATE 35: CPT | Performed by: INTERNAL MEDICINE

## 2021-01-22 PROCEDURE — 85610 PROTHROMBIN TIME: CPT

## 2021-01-22 PROCEDURE — 2060000000 HC ICU INTERMEDIATE R&B

## 2021-01-22 PROCEDURE — 6360000004 HC RX CONTRAST MEDICATION: Performed by: INTERNAL MEDICINE

## 2021-01-22 PROCEDURE — 6360000002 HC RX W HCPCS: Performed by: STUDENT IN AN ORGANIZED HEALTH CARE EDUCATION/TRAINING PROGRAM

## 2021-01-22 PROCEDURE — 6370000000 HC RX 637 (ALT 250 FOR IP): Performed by: INTERNAL MEDICINE

## 2021-01-22 PROCEDURE — 2580000003 HC RX 258: Performed by: STUDENT IN AN ORGANIZED HEALTH CARE EDUCATION/TRAINING PROGRAM

## 2021-01-22 PROCEDURE — 80164 ASSAY DIPROPYLACETIC ACD TOT: CPT

## 2021-01-22 PROCEDURE — 80053 COMPREHEN METABOLIC PANEL: CPT

## 2021-01-22 PROCEDURE — 36415 COLL VENOUS BLD VENIPUNCTURE: CPT

## 2021-01-22 PROCEDURE — 6370000000 HC RX 637 (ALT 250 FOR IP): Performed by: PHYSICIAN ASSISTANT

## 2021-01-22 PROCEDURE — 85027 COMPLETE CBC AUTOMATED: CPT

## 2021-01-22 PROCEDURE — 6370000000 HC RX 637 (ALT 250 FOR IP): Performed by: OTOLARYNGOLOGY

## 2021-01-22 RX ORDER — POLYETHYLENE GLYCOL 3350 17 G/17G
17 POWDER, FOR SOLUTION ORAL ONCE
Status: DISCONTINUED | OUTPATIENT
Start: 2021-01-22 | End: 2021-01-25 | Stop reason: HOSPADM

## 2021-01-22 RX ORDER — SENNA PLUS 8.6 MG/1
2 TABLET ORAL ONCE
Status: DISCONTINUED | OUTPATIENT
Start: 2021-01-22 | End: 2021-01-25 | Stop reason: HOSPADM

## 2021-01-22 RX ADMIN — DIVALPROEX SODIUM 500 MG: 500 TABLET, DELAYED RELEASE ORAL at 10:47

## 2021-01-22 RX ADMIN — LEVETIRACETAM 500 MG: 100 INJECTION, SOLUTION INTRAVENOUS at 22:38

## 2021-01-22 RX ADMIN — LACOSAMIDE 300 MG: 200 TABLET ORAL at 10:47

## 2021-01-22 RX ADMIN — INSULIN GLARGINE 35 UNITS: 100 INJECTION, SOLUTION SUBCUTANEOUS at 10:56

## 2021-01-22 RX ADMIN — ROPINIROLE 1 MG: 1 TABLET, FILM COATED ORAL at 10:47

## 2021-01-22 RX ADMIN — BACITRACIN ZINC NEOMYCIN SULFATE POLYMYXIN B SULFATE: 400; 3.5; 5 OINTMENT TOPICAL at 04:28

## 2021-01-22 RX ADMIN — ROPINIROLE 1 MG: 1 TABLET, FILM COATED ORAL at 18:03

## 2021-01-22 RX ADMIN — LEVETIRACETAM 2000 MG: 500 TABLET ORAL at 10:47

## 2021-01-22 RX ADMIN — DEXTROSE MONOHYDRATE 200 MG: 50 INJECTION, SOLUTION INTRAVENOUS at 22:59

## 2021-01-22 RX ADMIN — INSULIN GLARGINE 35 UNITS: 100 INJECTION, SOLUTION SUBCUTANEOUS at 21:43

## 2021-01-22 RX ADMIN — IOPAMIDOL 80 ML: 755 INJECTION, SOLUTION INTRAVENOUS at 10:24

## 2021-01-22 RX ADMIN — Medication 2500 MCG: at 10:47

## 2021-01-22 RX ADMIN — METOPROLOL TARTRATE 75 MG: 25 TABLET ORAL at 10:55

## 2021-01-22 RX ADMIN — DULOXETIN HYDROCHLORIDE 120 MG: 60 CAPSULE, DELAYED RELEASE ORAL at 10:52

## 2021-01-22 NOTE — PROGRESS NOTES
Gastroenterology Progress Note:     Patient Name:  Aníbal Bronson   MRN: [id number]  [id number]  YOB: 1970  Admit Date: 1/15/2021  8:06 AM  Primary Care Physician: Julian Valdez MD   [id number]     Patient seen and examined. 24 hours events and chart reviewed. Subjective: Patient resting in bed, family present. He says his abdomen hurts, but upon palpation of the entire abdomen, denies pain. Family is concerned that he is drowsy this morning. Unable to get MRCP yesterday due to claustrophobia even with Ativan. He is agreeable to CT scan. Objective:  /69   Pulse 87   Temp 99.2 °F (37.3 °C) (Axillary)   Resp 17   Ht 6' 2\" (1.88 m)   Wt 226 lb 3.2 oz (102.6 kg)   SpO2 95%   BMI 29.04 kg/m²     Physical Exam:    General:  Nourished in no distress  HEENT: Atraumatic, normocephalic. Moist oral mucous membranes. Neck: Supple without adenopathy, JVD, thyromegaly or masses. Trachea midline. CV: Heart RRR, no murmurs, rubs, gallops. Resp: Even, easy without cough or accessory use. Lungs clear to ascultation bilaterally. Abd: Round, soft, nontender. No hepatosplenomegaly or mass present. Active bowel sounds heard. No distention noted. Ext:  Without cyanosis, clubbing, edema. Skin: Pink, warm, dry  Neuro:  Alert, oriented x 3 with no obvious deficits.        Rectal: deferred    Labs:   CBC:   Lab Results   Component Value Date    WBC 8.2 01/22/2021    HGB 17.8 01/22/2021    HCT 50.4 01/22/2021    MCV 88.0 01/22/2021     01/22/2021     BMP:   Lab Results   Component Value Date     01/22/2021    K 4.3 01/22/2021     01/22/2021    CO2 19 01/22/2021    PHOS 3.0 06/22/2019    BUN 17 01/22/2021    CREATININE 0.6 01/22/2021    CALCIUM 9.2 01/22/2021     PT/INR:   Lab Results   Component Value Date    INR 1.15 01/22/2021     Lipids:   Lab Results   Component Value Date    ALKPHOS 398 01/22/2021     01/22/2021    AST 92 01/22/2021    BILITOT 1.8 01/22/2021 BILIDIR 0.5 01/21/2021    LABALBU 4.0 01/22/2021    LABALBU 4.3 03/23/2012    AMYLASE 29 09/01/2020    LIPASE 20.3 01/20/2021     Current Meds:  Scheduled Meds:   senna  2 tablet Oral Once    polyethylene glycol  17 g Oral Once    levETIRAcetam  2,000 mg Oral BID    lacosamide  300 mg Oral BID    metoprolol tartrate  75 mg Oral BID    insulin glargine  35 Units Subcutaneous BID    insulin lispro  0-18 Units Subcutaneous TID WC    insulin lispro  0-9 Units Subcutaneous Nightly    neomycin-bacitracin-polymyxin   Topical 3 times per day    [Held by provider] atorvastatin  10 mg Oral Daily    vitamin B-12  2,500 mcg Oral Daily    divalproex  500 mg Oral BID    DULoxetine  120 mg Oral Daily    empagliflozin  25 mg Oral Daily    [Held by provider] fenofibrate  160 mg Oral Daily    [Held by provider] insulin lispro  30 Units Subcutaneous TID AC    pantoprazole  40 mg Oral BID    rOPINIRole  1 mg Oral TID    zonisamide  400 mg Oral Nightly     Continuous Infusions:   dextrose         Assessment:  47 yo M admitted 01/15/21 for scheduled thyroid lobectomy due to follicular neoplasm of the thyroid. He is having a lot of throat pain & taking little in PO. GI consulted for elevated LFTs. Unable to get MRCP due to claustrophobia, even with Ativan. 1. Follicular neoplasm of the thyroid s/p thyroid lobectomy 01/15/21  2. Elevated LFTs- trending down  3. Seizure disorder  4. DM  5. H/O HTN  6. GERD  7. HLD  8.  Depression, bipolar disorder    Plan:    · Monitor hepatic labs  · Avoid hepatotoxic meds  · CT abdomen/pelvis  · NPO, may have diet after CT scan  · Pain control per primary  · Continue PO PPI BID, home dose  · Consult dietitian  · Case discussed with resident Dr. Sonya Cleveland  · Supportive care per primary team  Will follow    Case reviewed and impression/plan reviewed in collaboration with Dr. Jia Posey  Electronically signed by NICANOR Yu CNP on 1/22/2021 at 9:14 AM    [facility]     If there are any questions or concerns this weekend, please call Dr. Coco Carr as he is covering for [facility].

## 2021-01-22 NOTE — PROGRESS NOTES
Hospitalist Progress Note    Patient:  Adelia Isabel      Unit/Bed:4K-24/024-A    YOB: 1970    MRN: [id number]       Acct: [de-identified]     PCP: Saba Jones MD    Date of Admission: 1/15/2021    Assessment/Plan:    Acute transaminitis - Unclear etiology. Alk phos=228, repeat 220, AST=78, repeat 74, ALT=75, repeat 71. Patient has not been hypotensive to indicate ischemic causes nor had any direct hepatic injury from new medications. Patient denies abdominal pain however this may be masked by pain medication. Patient has had cholecystectomy in 2004. - Hepatic Ultrasound  1/21/21 - Worsening transaminitis. Alk phos=416, ALT=243, HTQ=828, bilirubin=2.8, lipase=20.3. No gross abnormality found on liver ultrasound yesterday however the pancreas was not visualized secondary to bowel gas. Etiology unclear at this time however the [facility] Ratio indicates cholestatic with a value of R<2. Obtain INR and direct bilirubin lab. GI Consulted. Recommend MRCP.  1/22/21 - Improved today with downtrending AST, ALT, Alk Phos. The most likely etiology is biliary stone given the acute onset and improvement. However acute hepatic injury secondary to Depakote use cannot be excluded despite his longstanding use of the medication. Recommend attempting to repeat MRCP today if possible otherwise CT abd/pelvis. Valproic acid level will be obtain today. Recommend free valproic acid level test. Patient should hold statin and fenofibrate at discharge and obtain repeat LFT's as outpatient. 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  Patient tolerated surgery well.  ENT attending/managing. 1/16: pt not taking PO in any significant capacity, encouraged use of viscous lidocaine  1/17: continuing to encourage PO intake  1/20/21 - Patient has increased PO intake over last 24 hours, however he is still being encouraged to continue.   1/21/21 - ENT transferred service to hospitalist for further care and management. Seizure disorder: Complex history with dynamic seizure medication dosing per primary.  Continue home doses of Depakote, Keppra, zonisamide, and lacosamide.  Continue to monitor closely, maintain seizure precautions. 1/16: pt refusing to take PO, discussed the need for this with him, but in the meantime give IV vimpat and keppra - no IV formulation of Zonegran available (pt on depakote for mood, not sz)  1/17: did not take zonegran last night, encouraging he do so tonight and continue with IV for now  1/18: says he will \"try\" to take Dyana Javier, did feel like he had \"semi seizure\" early this AM but is fine now  1/19: did not take Zonegran, we continue to encourage PO intake. 1/20/21: Patient was able to take all medication this morning. PO intake was encouraged and he was counseled on importance of compliance. Type 2 diabetes mellitus: Hemoglobin A1c 9.3 on 12/22/2020. Brianna Cunningham blood glucose level at 225.  Start Accu-Cheks before meals and at bedtime with sliding scale insulin coverage (low-dose corrective algorithm).  Hold Lantus and additional mealtime coverage insulin until patient is able to tolerate more substantial diet; continue Jardiance.  Hypoglycemia protocol in place, maintain carb controlled diet when cleared by primary. 1/16: resume lantus at 50 units BID and monitor, continue with just a SSI, not his typical high dose of mealtime coverage for now  1/17: glucose stable so far, continue to adjust as PO intake increases    Essential hypertension: History, not at goal. Dasia Blu secondary to pain response.  Continue metoprolol.  Labetalol available PRN for SBP >165. 1/16: as above, not taking PO, trying to encourage this    Hyperlipidemia: Noted. Continue fenofibrate and atorvastatin. GERD: Continue Protonix. Depression, bipolar disorder: Continue Cymbalta. Restless leg syndrome: Continue Requip. Sleep apnea: History, noted.   Obesity: BMI 32.46        Expected discharge date:  1/21/21    Disposition:    [x] Home       [] TCU       [] Rehab       [] Psych       [] SNF       [] [facility]       [] Other-    Chief Complaint: Thyroid [address] Course: \"Nicolas Sow is a 49-year-old  male, lifetime non-smoker, with a medical history of hyperlipidemia, hypertension, restless leg syndrome, chronic back pain, irritable bowel syndrome, seizure disorder, neuropathy, type 2 diabetes mellitus, anemia, chronic bronchitis, acid reflux, liver disease, depression, bipolar disorder, sleep apnea, pancreatic insufficiency, systolic dysfunction, and obesity. Patient presented to [facility] for a thyroid lobectomy. Zachariah Yepez has a history of a follicular neoplasm of the thyroid and Dr. Ramesh George the thyroid lobectomy on 1/15/2021, the patient tolerated the procedure well.  Postoperatively, the patient remained hemodynamically stable and was transitioned to the stepdown unit. The hospitalist service was consulted for medical management. \"    1/16/21: IV seizure medications for now and monitor   1/17/21: continue with IV medications and encouraging PO intake  1/19/21: continue to encourage PO intake, hopeful discharge tomorrow    1/20/21 - Patient had flat affect during examination today. He did not seem to have much interest in discussing his plan of care and continued complaint of mouth pain. He stated that it is still troublesome for him to eat despite current pain medication. The patient had elevated liver enzymes. 1/21/21 - MRCP attempted for transaminitis however the patient was unable to complete the exam despite giving 0.5mg ativan secondary to claustrophobia. Subjective (past 24 hours):  No acute events over night per nursing. Patient remains with flat affect and had limited conversation during examination. The patient denied chest pain, shortness of breath, abdominal pain.  He admits to not having a bowel movement and ALKPHOS 220*  228* 229* 416*  --  398*     Recent Labs     01/21/21  1231 01/22/21  0333   INR 1.21* 1.15*     No results for input(s): CKTOTAL, TROPONINI in the last 72 hours. Microbiology:      Urinalysis:      Lab Results   Component Value Date    NITRU NEGATIVE 11/15/2020    WBCUA NONE 08/12/2019    WBCUA 6-10 10/17/2015    BACTERIA NONE 08/12/2019    RBCUA NONE 08/12/2019    BLOODU NEGATIVE 11/15/2020    SPECGRAV >1.030 08/12/2019    GLUCOSEU >= 1000 11/15/2020       Radiology:  US LIVER   Final Result   Somewhat limited evaluation does not demonstrate any gross abnormality of the liver            **This report has been created using voice recognition software. It may contain minor errors which are inherent in voice recognition technology. **      Final report electronically signed by Dr. Sb Pate on 1/20/2021 3:58 PM      MRI ABDOMEN W WO CONTRAST MRCP    (Results Pending)       DVT prophylaxis: [] Lovenox                                 [x] SCDs                                 [] SQ Heparin                                 [] Encourage ambulation           [] Already on Anticoagulation     Code Status: Full Code      Tele:   [x] yes             [] no    Electronically signed by Lizette Parsons DO on 1/22/2021 at 7:51 AM

## 2021-01-22 NOTE — PROGRESS NOTES
Comprehensive Nutrition Assessment    Type and Reason for Visit:  Reassess, Consult    Nutrition Recommendations/Plan:   Monitor SLP swallow eval results. Send magic cup BID. Send Conner BID. Consider MVI. Nutrition Assessment:     Pt. nutritionally compromised AEB wounds. At risk for further nutrition compromise r/t increased nutrient needs for wound healing s/p thyroid lobectomy 1/15) acute transaminitis possible biliary stone underlying medical condition (Hx GERD, Distal Esophagitis, esophageal scarring post dilation, bipolar , depression, Type II DM, Seizure disorder, Fatty Liver Disease). Nutrition recommendations/interventions as per above. Malnutrition Assessment:  Malnutrition Status: At risk for malnutrition (Comment)    Context:  Acute Illness     Findings of the 6 clinical characteristics of malnutrition:  Energy Intake:  7 - 50% or less of estimated energy requirements for 5 or more days  Weight Loss:  Unable to assess     Body Fat Loss:  Unable to assess     Muscle Mass Loss:  Unable to assess    Fluid Accumulation:  Unable to assess     Strength:  Not Performed    Estimated Daily Nutrient Needs:  Energy (kcal):  2052-2565kcals (20-25kcals/kgm); Weight Used for Energy Requirements:  (102.6kgm (1/22))     Protein (g):  112-173 grams (1.3-2 grams protein/kgm IBW); Weight Used for Protein Requirements:  Ideal(86.4kgm IBW)          Nutrition Related Findings:  Pt sleeping. Wife reports pt has been consuming < 50% usual intake atleast 1 week. Pt s/p thyroid lobectomy (1/15). She mentions pt has a poor gag refelx & Dr Hortencia Alvarez mentions ordering a SLP swallow eval. Pt has missing teeth. Wife reports pt consumed pudding at breakfast, all of sherbert, 1/4 potatoes meat at lunch. Labs reviewed. Meds: Lantus, Humalog, Glycolax, Vitamin B12, Senokot, Jardiance. Wounds:  (thyroid lobectomy (1/15/21))       Current Nutrition Therapies:    DIET CARB CONTROL;  Dysphagia Minced and Moist  Dietary Nutrition Supplements: Frozen Oral Supplement  Dietary Nutrition Supplements: Wound Healing Oral Supplement    Anthropometric Measures:  · Height: 6' 2\" (188 cm)  · Current Body Weight: 226 lb 3.2 oz (102.6 kg)(trace RLE & LLE edema)   · Admission Body Weight: 252 lb 12.8 oz (114.7 kg)((1/15) + 1 RLE & LLE edema)    · Usual Body Weight: (per EMR: (1/1221) 243# 6.4oz, (1/6/21) 241# 11.2oz, (12/22/20): 235# 4.8oz)     · Ideal Body Weight: 190 lbs;     · BMI: 29  · BMI Categories: Overweight (BMI 25.0-29. 9)       Nutrition Diagnosis:   · Increased nutrient needs related to increase demand for energy/nutrients as evidenced by wounds      Nutrition Interventions:   Food and/or Nutrient Delivery:  Vitamin Supplement, Start Oral Nutrition Supplement(Diet per SLP/MD)  Nutrition Education/Counseling:  Education not appropriate     Goals:  Pt will meet nutritional needs within next 24-48hrs. Nutrition Monitoring and Evaluation:   Behavioral-Environmental Outcomes:  None Identified   Food/Nutrient Intake Outcomes:  Diet Advancement/Tolerance, Food and Nutrient Intake, Vitamin/Mineral Intake  Physical Signs/Symptoms Outcomes:  Biochemical Data, Chewing or Swallowing, GI Status, Weight, Skin, Nutrition Focused Physical Findings, Hemodynamic Status, Fluid Status or Edema     Discharge Planning:     Too soon to determine     Electronically signed by Adolfo Valencia, RD, LD on 1/22/21 at 3:50 PM EST    Contact: [phone]

## 2021-01-22 NOTE — PLAN OF CARE
Problem: Nutrition  Goal: Optimal nutrition therapy  Outcome: Ongoing   Nutrition Problem #1: Increased nutrient needs  Intervention: Food and/or Nutrient Delivery: (Start diet when appropriate, consider SLP swallow eval to insure swallowing safety.)  Nutritional Goals: Pt will meet nutritional needs within next 24-48hrs.

## 2021-01-22 NOTE — PLAN OF CARE
Problem: Nutrition  Goal: Optimal nutrition therapy  1/22/2021 1555 by Kip Vleez RD, LD  Outcome: Ongoing   Nutrition Problem #1: Increased nutrient needs  Intervention: Food and/or Nutrient Delivery: Vitamin Supplement, Start Oral Nutrition Supplement(Diet per SLP/MD)  Nutritional Goals: Pt will meet nutritional needs within next 24-48hrs.

## 2021-01-22 NOTE — PROGRESS NOTES
Comprehensive Nutrition Assessment    Type and Reason for Visit:  Initial, RD Nutrition Re-Screen/LOS    Nutrition Recommendations/Plan:   Initiate diet as appropriate. Consider SLP swallow eval to insure swallowing safety. Consider MVI. Nutrition Assessment:     Pt. nutritionally compromised AEB NPO. At risk for further nutrition compromise r/t admit for follicular neoplasm of thyroid (  Increased nutrient needs for wound healing s/p thyroid lobectomy 1/15) and underlying medical condition (Hx: GERD, Distal Esophagitis, esophageal scarring post dilation, bipolar, depression, Type II Dm, fatty liver disease). Nutrition recommendations/interventions as per above. Malnutrition Assessment:  Malnutrition Status: At risk for malnutrition (Comment)    Context:  Acute Illness     Findings of the 6 clinical characteristics of malnutrition:  Energy Intake:  Unable to assess  Weight Loss:  No significant weight loss     Body Fat Loss:  Unable to assess     Muscle Mass Loss:  Unable to assess    Fluid Accumulation:  Unable to assess     Strength:  Not Performed    Estimated Daily Nutrient Needs:  Energy (kcal):  2052-2565kcals (20-25kcals/kgm); Weight Used for Energy Requirements:  (102.6kgm (1/22))     Protein (g):  112-173 grams (1.3-2 grams protein/kgm IBW); Weight Used for Protein Requirements:  Ideal(86.4kgm IBW)            Nutrition Related Findings:  Pt N/A x 2. Pt NPO. Labs reviewed. Meds: Lantus, Humalog, Glycolax, Vitamin B 12,  Senokot, Jardiance.       Wounds:  (thyroid lobectomy (1/15/21))       Current Nutrition Therapies:    NPO    Anthropometric Measures:  · Height: 6' 2\" (188 cm)  · Current Body Weight: 226 lb 3.2 oz (102.6 kg)(trace RLE & LLE edema)   · Admission Body Weight: 252 lb 12.8 oz (114.7 kg)((1/15) + 1 RLE & LLE edema)    · Usual Body Weight: (per EMR: (1/1221) 243# 6.4oz, (1/6/21) 241# 11.2oz, (12/22/20): 235# 4.8oz)     · Ideal Body Weight: 190 lbs;     · BMI: 29  ·   · BMI Categories: Overweight (BMI 25.0-29. 9)       Nutrition Diagnosis:   · Increased nutrient needs related to increase demand for energy/nutrients as evidenced by wounds      Nutrition Interventions:   Food and/or Nutrient Delivery:  (Start diet when appropriate, consider SLP swallow eval to insure swallowing safety.)  Nutrition Education/Counseling:  No recommendation at this time     Goals:  Pt will meet nutritional needs within next 24-48hrs. Nutrition Monitoring and Evaluation:   Behavioral-Environmental Outcomes:  None Identified   Food/Nutrient Intake Outcomes:  Diet Advancement/Tolerance, Food and Nutrient Intake, Vitamin/Mineral Intake  Physical Signs/Symptoms Outcomes:  Biochemical Data, Chewing or Swallowing, GI Status, Weight, Skin, Nutrition Focused Physical Findings, Hemodynamic Status, Fluid Status or Edema     Discharge Planning:     Too soon to determine     Electronically signed by Severa Ly, RD, LD on 1/22/21 at 12:22 PM EST    Contact: [phone]

## 2021-01-22 NOTE — CARE COORDINATION
DISASTER CHARTING    1/22/21, 8:33 AM EST    DISCHARGE ONGOING EVALUATION:     [id number] [facility] day: 6  Location: -24/024-A Reason for admit: Follicular neoplasm of thyroid [D49.7]  MISTI (obstructive sleep apnea) [G47.33]   Barriers to Discharge:   Confused this am, sitter at [facility], alert to person only, CT head: negative,  CT abdomen: Splenomegaly, Cardiomegaly and bibasilar atelectasis. GI follows, dietitian added, dysphagia minced monist diet, strict I/O, follow LFTs, Alk phos improved 398, , ammonia 54, AST 92, valporic acid level 32.8, Lipitor on hold, accu checks as ordered with High ISS,     PCP: Milton Buchanan MD  Readmission Risk Score: 13%  Patient Goals/Plan/Treatment Preferences: plans home with spouse; declines HH or needs. 1/22/21, 3:16 PM EST    Patient goals/plan/ treatment preferences discussed by  and . Patient goals/plan/ treatment preferences reviewed with patient/ family. Patient/ family verbalize understanding of discharge plan and are in agreement with goal/plan/treatment preferences. Understanding was demonstrated using the teach back method. AVS provided by RN at time of discharge, which includes all necessary medical information pertaining to the patients current course of illness, treatment, post-discharge goals of care, and treatment preferences.         IMM Letter  IMM Letter given to Patient/Family/Significant other/Guardian/POA/by[de-identified] CM  IMM Letter date given[de-identified] 01/22/21  IMM Letter time given[de-identified] 9910

## 2021-01-23 LAB
ALBUMIN SERPL-MCNC: 4.2 G/DL (ref 3.5–5.1)
ALP BLD-CCNC: 303 U/L (ref 38–126)
ALT SERPL-CCNC: 116 U/L (ref 11–66)
AMMONIA: 46 UMOL/L (ref 11–60)
ANION GAP SERPL CALCULATED.3IONS-SCNC: 16 MEQ/L (ref 8–16)
AST SERPL-CCNC: 29 U/L (ref 5–40)
BILIRUB SERPL-MCNC: 1.1 MG/DL (ref 0.3–1.2)
BUN BLDV-MCNC: 20 MG/DL (ref 7–22)
CALCIUM IONIZED: 1.01 MMOL/L (ref 1.12–1.32)
CALCIUM SERPL-MCNC: 9.6 MG/DL (ref 8.5–10.5)
CHLORIDE BLD-SCNC: 107 MEQ/L (ref 98–111)
CO2: 20 MEQ/L (ref 23–33)
CREAT SERPL-MCNC: 0.6 MG/DL (ref 0.4–1.2)
ERYTHROCYTE [DISTWIDTH] IN BLOOD BY AUTOMATED COUNT: 12.7 % (ref 11.5–14.5)
ERYTHROCYTE [DISTWIDTH] IN BLOOD BY AUTOMATED COUNT: 40.1 FL (ref 35–45)
GFR SERPL CREATININE-BSD FRML MDRD: > 90 ML/MIN/1.73M2
GLUCOSE BLD-MCNC: 109 MG/DL (ref 70–108)
GLUCOSE BLD-MCNC: 144 MG/DL (ref 70–108)
GLUCOSE BLD-MCNC: 172 MG/DL (ref 70–108)
GLUCOSE BLD-MCNC: 177 MG/DL (ref 70–108)
GLUCOSE BLD-MCNC: 186 MG/DL (ref 70–108)
HCT VFR BLD CALC: 50.7 % (ref 42–52)
HEMOGLOBIN: 17.9 GM/DL (ref 14–18)
INR BLD: 1.08 (ref 0.85–1.13)
IRON: 64 UG/DL (ref 65–195)
MCH RBC QN AUTO: 30.9 PG (ref 26–33)
MCHC RBC AUTO-ENTMCNC: 35.3 GM/DL (ref 32.2–35.5)
MCV RBC AUTO: 87.4 FL (ref 80–94)
PLATELET # BLD: 277 THOU/MM3 (ref 130–400)
PMV BLD AUTO: 9.5 FL (ref 9.4–12.4)
POTASSIUM REFLEX MAGNESIUM: 3.7 MEQ/L (ref 3.5–5.2)
RBC # BLD: 5.8 MILL/MM3 (ref 4.7–6.1)
SODIUM BLD-SCNC: 143 MEQ/L (ref 135–145)
T4 FREE: 1.24 NG/DL (ref 0.93–1.76)
TOTAL IRON BINDING CAPACITY: 247 UG/DL (ref 171–450)
TOTAL PROTEIN: 7.5 G/DL (ref 6.1–8)
TSH SERPL DL<=0.05 MIU/L-ACNC: 1.2 UIU/ML (ref 0.4–4.2)
WBC # BLD: 9.3 THOU/MM3 (ref 4.8–10.8)

## 2021-01-23 PROCEDURE — 82948 REAGENT STRIP/BLOOD GLUCOSE: CPT

## 2021-01-23 PROCEDURE — 6370000000 HC RX 637 (ALT 250 FOR IP): Performed by: PSYCHIATRY & NEUROLOGY

## 2021-01-23 PROCEDURE — 92526 ORAL FUNCTION THERAPY: CPT

## 2021-01-23 PROCEDURE — 36415 COLL VENOUS BLD VENIPUNCTURE: CPT

## 2021-01-23 PROCEDURE — 80053 COMPREHEN METABOLIC PANEL: CPT

## 2021-01-23 PROCEDURE — 2580000003 HC RX 258: Performed by: PHYSICIAN ASSISTANT

## 2021-01-23 PROCEDURE — 2060000000 HC ICU INTERMEDIATE R&B

## 2021-01-23 PROCEDURE — C9254 INJECTION, LACOSAMIDE: HCPCS | Performed by: PHYSICIAN ASSISTANT

## 2021-01-23 PROCEDURE — 83516 IMMUNOASSAY NONANTIBODY: CPT

## 2021-01-23 PROCEDURE — 92610 EVALUATE SWALLOWING FUNCTION: CPT

## 2021-01-23 PROCEDURE — 84439 ASSAY OF FREE THYROXINE: CPT

## 2021-01-23 PROCEDURE — 85610 PROTHROMBIN TIME: CPT

## 2021-01-23 PROCEDURE — 6360000002 HC RX W HCPCS: Performed by: PHYSICIAN ASSISTANT

## 2021-01-23 PROCEDURE — 99232 SBSQ HOSP IP/OBS MODERATE 35: CPT | Performed by: INTERNAL MEDICINE

## 2021-01-23 PROCEDURE — 82140 ASSAY OF AMMONIA: CPT

## 2021-01-23 PROCEDURE — 85027 COMPLETE CBC AUTOMATED: CPT

## 2021-01-23 PROCEDURE — 82784 ASSAY IGA/IGD/IGG/IGM EACH: CPT

## 2021-01-23 PROCEDURE — 84443 ASSAY THYROID STIM HORMONE: CPT

## 2021-01-23 PROCEDURE — 83540 ASSAY OF IRON: CPT

## 2021-01-23 PROCEDURE — 83550 IRON BINDING TEST: CPT

## 2021-01-23 PROCEDURE — 6370000000 HC RX 637 (ALT 250 FOR IP): Performed by: INTERNAL MEDICINE

## 2021-01-23 PROCEDURE — 6370000000 HC RX 637 (ALT 250 FOR IP): Performed by: PHYSICIAN ASSISTANT

## 2021-01-23 PROCEDURE — 6370000000 HC RX 637 (ALT 250 FOR IP): Performed by: OTOLARYNGOLOGY

## 2021-01-23 PROCEDURE — 99223 1ST HOSP IP/OBS HIGH 75: CPT | Performed by: PSYCHIATRY & NEUROLOGY

## 2021-01-23 PROCEDURE — 82330 ASSAY OF CALCIUM: CPT

## 2021-01-23 PROCEDURE — 90792 PSYCH DIAG EVAL W/MED SRVCS: CPT | Performed by: NURSE PRACTITIONER

## 2021-01-23 RX ORDER — GABAPENTIN 300 MG/1
600 CAPSULE ORAL 4 TIMES DAILY
Status: DISCONTINUED | OUTPATIENT
Start: 2021-01-23 | End: 2021-01-25 | Stop reason: HOSPADM

## 2021-01-23 RX ADMIN — LEVETIRACETAM 2000 MG: 500 TABLET ORAL at 10:34

## 2021-01-23 RX ADMIN — METOPROLOL TARTRATE 75 MG: 25 TABLET ORAL at 10:39

## 2021-01-23 RX ADMIN — DULOXETIN HYDROCHLORIDE 120 MG: 60 CAPSULE, DELAYED RELEASE ORAL at 10:31

## 2021-01-23 RX ADMIN — DIVALPROEX SODIUM 500 MG: 500 TABLET, DELAYED RELEASE ORAL at 10:31

## 2021-01-23 RX ADMIN — GABAPENTIN 600 MG: 300 CAPSULE ORAL at 16:22

## 2021-01-23 RX ADMIN — BACITRACIN ZINC NEOMYCIN SULFATE POLYMYXIN B SULFATE: 400; 3.5; 5 OINTMENT TOPICAL at 23:47

## 2021-01-23 RX ADMIN — ROPINIROLE 1 MG: 1 TABLET, FILM COATED ORAL at 16:22

## 2021-01-23 RX ADMIN — INSULIN GLARGINE 35 UNITS: 100 INJECTION, SOLUTION SUBCUTANEOUS at 08:33

## 2021-01-23 RX ADMIN — Medication 2500 MCG: at 10:39

## 2021-01-23 RX ADMIN — LACOSAMIDE 300 MG: 200 TABLET ORAL at 10:33

## 2021-01-23 RX ADMIN — DEXTROSE MONOHYDRATE 200 MG: 50 INJECTION, SOLUTION INTRAVENOUS at 22:08

## 2021-01-23 RX ADMIN — ROPINIROLE 1 MG: 1 TABLET, FILM COATED ORAL at 10:31

## 2021-01-23 RX ADMIN — BACITRACIN ZINC NEOMYCIN SULFATE POLYMYXIN B SULFATE: 400; 3.5; 5 OINTMENT TOPICAL at 06:04

## 2021-01-23 RX ADMIN — LEVETIRACETAM 2000 MG: 100 INJECTION, SOLUTION INTRAVENOUS at 21:44

## 2021-01-23 RX ADMIN — INSULIN GLARGINE 35 UNITS: 100 INJECTION, SOLUTION SUBCUTANEOUS at 22:39

## 2021-01-23 NOTE — CONSULTS
NEUROLOGY CONSULT NOTE       Requesting Physician: Favio Avila MD     Reason for Consult:  Evaluate for \" seizures-history of epilepsy\"    History of Present Illness:  Meryle Speak is a 48 y.o. male admitted to [facility] on 1/15/2021 for a thyroid lobectomy and uvulopalatopharyngoplasty. He had been somewhat lethargic or withdrawn a little confused so psychiatry was consulted. Psychiatry spoke with the patient's wife who felt these problems may have been because he was postictal as these would be typical of his postictal state. Prior to admission he had a seizure perhaps every couple of months or so. He has not had a severe seizure in quite some time. He can tell the seizures coming on, according to his wife, by feeling as if his eyes were rolling around his head. He then mostly just stares. Not a lot of jerking activity. He may then be postictal for a couple of days. He, unfortunately, will not answer most of my questions. He cannot tell me if he had his aura. No one witnessed anything like a seizure. It is notable that he had not been taking his oral medications for a few days earlier in his stay. Reviewing medications I also see that he has not been receiving Neurontin. He is on this for painful neuropathy. He is on the Depakote for mood. Other medications for seizures. He had an arachnoid cyst drained in 2012 and has subsequently had seizures. For this he follows at the [facility], and seizures were quite uncontrolled before he was on all these different medications. .    Past Medical History:        Diagnosis Date    Abnormal thyroid biopsy     Acid reflux     Anemia     resolved - previously seen by Dr. Jer Byrd (due to enlarged spleen)    Anesthesia     seizures with brain surgery due to blood sugar got really high    Bipolar disorder ([facility] [address] [facility]     Blood clot in vein 04/07/2016    [facility]) 04/2019    thyroid    Chest pain previously seeing [facility] cardiologist, now seeing Dr. Milton Klinefelter (LAD bridging on cath 4/2016)    Chronic back pain     Chronic bronchitis ([facility] [address]     Dr. Darby Farrar Colon polyp 08/30/2016    Depression     seeing Viviana Stern DVT (deep venous thrombosis) ([facility] [address] [id number]    not on Coumadin due to unable to regulate - Dr. Magda Byrd - no etiology found per patient    Esophageal abnormality     nodule     Fatty liver disease, nonalcoholic     U/S 53/[facility] [facility]    [facility]     legs    History of Doppler ultrasound 05/29/2011    No hemodynamically significant carotid stenosis is identified. A thyroid nodule on each side. Dedicated ultrasound of thyroid gland is suggested to further evaluate if clinically indicated.  History of pulmonary embolism 2007    s/p GFF, related to knee surgery    Hx of blood clots     leg and lung    Hyperlipidemia     severely elevated triglycerides    Hypertension     diastolic    Intracranial arachnoid cyst 11/2012    Dr. Mara Araya drained, complicated with seizures, DKA    Irritable bowel syndrome     Liver disease     Adela Grissom - elevated LFT - positive smooth muscle antibody, steatosis per liver bx 3/2014 with Dr. Pinto Head (multiple drug resistant organisms) resistance 2012    MRSA (methicillin resistant staph aureus) culture positive     h/o in foot and before brain surgery    Nephrolithiasis     noted on CT abdomen 9/2016, 6/2019    Neuropathy     Pancreatic insufficiency     Positive SANDY (antinuclear antibody)     Dr. Lyndon Loya - first visit in 6/2013    Prolonged emergence from general anesthesia     Restless legs syndrome     Seizures ([facility] [address]     started with brain surgery    Sinus tachycardia     Holter 3/2013 - seeing Dr. Abdirizak Shaver fracture [facility])     clips     Sleep apnea     positive sleep apnea per study 10/2020.     Systolic dysfunction     \"systolic bridge\"  EF 96% ECHO 9/2019    Type II or unspecified type diabetes mellitus without mention of complication, not stated as uncontrolled 2007           Procedure Laterality Date    CARDIAC CATHETERIZATION      2011,5-6 years ago   [address]  3-2012    CARDIAC CATHETERIZATION  04/28/2016    [address]     CARPAL TUNNEL RELEASE  01/2017    CHOLECYSTECTOMY  2004    COLONOSCOPY  2012    COLONOSCOPY  08/2016    2 tubular adenomas - reportedly needs repeat scope in 6 months    CRANIOTOMY  11/2012    arachnoid cyst drainage    EKG 12-LEAD  10/18/2015         ENDOSCOPY, COLON, DIAGNOSTIC      HERNIA REPAIR Right 1996    [facility]--Dr. Dat Anguiano INGUINAL HERNIA REPAIR Right 09/15/2016    Robotic assisted    KNEE ARTHROSCOPY Right 2016    KNEE SURGERY Left 2007    acl and debrided twice    OTHER SURGICAL HISTORY  5-31-11    Tilt table was associated w/ nonspecific symptoms or nausea, otherwise no significant dizziness or syncope. No significant hemodynamic changes. Otherwise, unremarkable tilt table test after 30 minutes of tilting.  OTHER SURGICAL HISTORY  01/20/2017    RIGHT SHOULDER ARTHROSCOPY, OPEN STAN, OPEN ACROMIOPLASTY, BICEP TENDESIS, RIGHT CARPAL TUNNEL RELEASE    SHOULDER SURGERY Right 01/2007    THYROID LOBECTOMY N/A 1/15/2021    THYROID LOBECTOMY, UVULOPALATOPHARYNGOPLAST LAOP performed by Jose Vazquez MD at [address] ECHOCARDIOGRAM  3-05-11    LV size and systolic function normal. EF 55-65%.      UPPER GASTROINTESTINAL ENDOSCOPY  2012    UPPER GASTROINTESTINAL ENDOSCOPY  2016    Dr. Denis Talavera Left 10/22/2019    EGD DILATION SAVORY performed by Alonzo Deng MD at [facility] ENDOSCOPY Left 10/22/2019    EGD BIOPSY performed by Alonzo Deng MD at [address]  2007       Social History:  Social History     Tobacco Use   Smoking Status Never Smoker   Smokeless Tobacco Never Used     Social History     Substance and Sexual Activity   Alcohol Use No    Alcohol/week: 0.0 standard drinks     Social History     Substance and Sexual Activity   Drug Use No         Family History:       Problem Relation Age of Onset    Heart Disease Father 61        MI    Stroke Brother     Obesity Brother     Arthritis Mother     Seizures Mother     Obesity Sister     Other Brother         pulmonary embolism    Diabetes Daughter     Seizures Brother        Review of Systems:  Patient was uncooperative for review of systems questions. He denied headache. Other than this he did not answer most of my questions. Allergies:     Allergies   Allergen Reactions    Ciprofloxacin-Ciproflox Hcl Er Other (See Comments)     Elevated creatinine    Heparin      Monitor for thrombocytopenia    Metformin Nausea Only     Abdominal pain, diarrhea    Niacin And Related Other (See Comments)     Burning sensation, severe flushing    Tramadol Hcl      Contraindication to seizure medications    Ultram [Tramadol]      Contraindication to seizure medications    Warfarin      Very difficult to regulate in past        Current Medications:       senna (SENOKOT) tablet 17.2 mg, Once      polyethylene glycol (GLYCOLAX) packet 17 g, Once      levETIRAcetam (KEPPRA) tablet 2,000 mg, BID      lacosamide (VIMPAT) tablet 300 mg, BID      metoprolol tartrate (LOPRESSOR) tablet 75 mg, BID      insulin glargine (LANTUS) injection vial 35 Units, BID      potassium chloride (KLOR-CON M) extended release tablet 40 mEq, PRN    Or      potassium bicarb-citric acid (EFFER-K) effervescent tablet 40 mEq, PRN    Or      potassium chloride 10 mEq/100 mL IVPB (Peripheral Line), PRN      insulin lispro (HUMALOG) injection vial 0-18 Units, TID WC      insulin lispro (HUMALOG) injection vial 0-9 Units, Nightly      magic (miracle) mouthwash, PRN      neomycin-bacitracin-polymyxin (NEOSPORIN) ointment, 3 times per day      [Held by provider] atorvastatin (LIPITOR) tablet 10 mg, Daily      vitamin B-12 (CYANOCOBALAMIN) tablet 2,500 mcg, Daily      [Held by provider] divalproex (DEPAKOTE) DR tablet 500 mg, BID      DULoxetine (CYMBALTA) extended release capsule 120 mg, Daily      empagliflozin (JARDIANCE) tablet TABS 25 mg, Daily      [Held by provider] fenofibrate (TRIGLIDE) tablet 160 mg, Daily      [Held by provider] insulin lispro (HUMALOG) injection vial 30 Units, TID AC      LORazepam (ATIVAN) tablet 0.5 mg, BID PRN      pantoprazole (PROTONIX) tablet 40 mg, BID      rOPINIRole (REQUIP) tablet 1 mg, TID      zonisamide (ZONEGRAN) capsule 400 mg, Nightly      glucose (GLUTOSE) 40 % oral gel 15 g, PRN      dextrose 50 % IV solution, PRN      glucagon (rDNA) injection 1 mg, PRN      dextrose 5 % solution, PRN      labetalol (NORMODYNE;TRANDATE) injection syringe 10 mg, Q6H PRN      promethazine (PHENERGAN) tablet 12.5 mg, Q8H PRN      HYDROcodone-acetaminophen (NORCO) 7.5-325 MG per tablet 1 tablet, Q6H PRN    Or      oxyCODONE-acetaminophen (PERCOCET) 5-325 MG per tablet 1 tablet, Q6H PRN    Or      oxyCODONE-acetaminophen (PERCOCET) 5-325 MG per tablet 2 tablet, Q4H PRN      ondansetron (ZOFRAN) injection 4 mg, Q6H PRN       Medications Prior to Admission: insulin glargine (LANTUS SOLOSTAR) 100 UNIT/ML injection pen, Inject 75 Units into the skin 2 times daily  insulin lispro, 1 Unit Dial, (HUMALOG KWIKPEN) 100 UNIT/ML SOPN, Inject 30 Units into the skin 3 times daily Plus sliding scale 2  gabapentin (NEURONTIN) 600 MG tablet, Take 600 mg by mouth 4 times daily.   rOPINIRole (REQUIP) 1 MG tablet, TAKE 1 TABLET BY MOUTH THREE TIMES A DAY  Cyanocobalamin (VITAMIN B-12) 2500 MCG SUBL, Place 1 tablet under the tongue daily  vitamin D (ERGOCALCIFEROL) 1.25 MG (86744 UT) CAPS capsule, Take 1 capsule by mouth once a week  metoprolol tartrate (LOPRESSOR) 50 MG tablet, Take 1.5 tablets by mouth 2 times daily  empagliflozin (JARDIANCE) 25 MG tablet, Take 25 mg by mouth daily  pantoprazole (PROTONIX) 40 MG tablet, Take 1 tablet by mouth 2 times daily  DULoxetine (CYMBALTA) 60 MG extended release capsule, Take by mouth daily 2 tab  lacosamide (VIMPAT) 100 MG TABS tablet, Take by mouth 2 times daily. 3 tab  divalproex (DEPAKOTE) 500 MG DR tablet, Take 500 mg by mouth 2 times daily   levETIRAcetam (KEPPRA) 500 MG tablet, Take by mouth 2 times daily 4 tab  zonisamide (ZONEGRAN) 100 MG capsule, Take by mouth nightly 4 tab  [DISCONTINUED] fenofibrate (TRICOR) 145 MG tablet, Take 1 tablet by mouth daily  [DISCONTINUED] atorvastatin (LIPITOR) 10 MG tablet, TAKE 1 TABLET BY MOUTH ONE TIME A DAY  promethazine (PHENERGAN) 12.5 MG tablet, Take 1-2 tablets by mouth every 8 hours as needed for Nausea  Melatonin 10 MG TABS, Take by mouth nightly 3 tab  sildenafil (VIAGRA) 100 MG tablet, Take 1 tablet by mouth as needed for Erectile Dysfunction  LORazepam (ATIVAN) 0.5 MG tablet, Take 0.5 mg by mouth 2 times daily as needed (seizures)     Physical Exam:  /80   Pulse 82   Temp 97.7 °F (36.5 °C) (Axillary)   Resp 15   Ht 6' 2\" (1.88 m)   Wt 220 lb 14.4 oz (100.2 kg)   SpO2 94%   BMI 28.36 kg/m²  I Body mass index is 28.36 kg/m². I   Wt Readings from Last 1 Encounters:   01/23/21 220 lb 14.4 oz (100.2 kg)        GENERAL: Very drowsy appearing patient who was fully reclined in a recliner chair. While he was arousable he kept closing his eyes and turning his head away from me. I think he was more alert than he appeared and I think he was resisting answering questions to some degree. EYE:  Fundi not examined due to the Covid-19 outbreak  CARDIOVASCULAR:  Heart regular rate and rhythm. No carotid bruits detected, though breath sounds were fairly loud. NEUROLOGIC:  Level of Alertness: Appears drowsy  MSE: Uncooperative for any mental status testing  Cranial Nerves: pupils are equal; eyes move conjugately in all directions. Other than this he would not cooperate for any cranial nerve testing.   Motor Exam: Normal tone in all extremities. He did not cooperate for confrontational strength testing but when I placed his upper extremities in the air he controlled their descent. Sensory and cerebellar testing not possible due to lack of cooperation  Deep Tendon Reflexes: Not clearly evoked  Plantar Responses: No responses  Abnormal movements: none  Station and gait: Unsafe to get up due to lethargy    Diagnostics:  CBC:   Lab Results   Component Value Date    WBC 9.3 01/23/2021    RBC 5.80 01/23/2021    RBC 4.93 03/26/2012    HGB 17.9 01/23/2021    HCT 50.7 01/23/2021    MCV 87.4 01/23/2021    MCH 30.9 01/23/2021    MCHC 35.3 01/23/2021    RDW 13.5 06/05/2018     01/23/2021    MPV 9.5 01/23/2021     CMP:    Lab Results   Component Value Date     01/23/2021    K 3.7 01/23/2021     01/23/2021    CO2 20 01/23/2021    BUN 20 01/23/2021    CREATININE 0.6 01/23/2021    LABGLOM >90 01/23/2021    GLUCOSE 109 01/23/2021    GLUCOSE 189 11/12/2015    PROT 7.5 01/23/2021    LABALBU 4.2 01/23/2021    LABALBU 4.3 03/23/2012    CALCIUM 9.6 01/23/2021    BILITOT 1.1 01/23/2021    ALKPHOS 303 01/23/2021    AST 29 01/23/2021     01/23/2021     PT/INR:    Lab Results   Component Value Date    INR 1.08 01/23/2021       CT brain images reviewed: I felt as if I could not get the window level properly adjusted but I saw no gross abnormalities. Impression:  Partial onset seizure disorder. Possibly had had a seizure here related to not getting his oral medications. In addition he has not been getting his gabapentin/Neurontin at all, and he was on a fairly high dosage of that at home, so withdrawal from that may have been contributing. Lethargy now may be a continuation of a postictal state. I do not see anything else to cause encephalopathy. Some of this may be psychogenic as well. Recommendations :  I resumed his Neurontin.   Now that his liver enzymes are trending downward we could consider putting him back on the valproic acid and seeing how he does with that as well, though I suspect the Neurontin was doing more as an anticonvulsant than the Depakote at that dosage. Hopefully with a couple more days on his usual oral medications plus the Neurontin we will see improvement. I will recheck the patient Monday. Please do not hesitate to call in the meantime if there are new concerns.       Electronically signed by Elias Friedman DO on 1/23/2021 at 3:31 PM

## 2021-01-23 NOTE — PROGRESS NOTES
[address] ICU STEPDOWN TELEMETRY 4K  Bedside Swallowing Evaluation      SLP Individual Minutes  Time In: 0900  Time Out: 9316  Minutes: 20  Timed Code Treatment Minutes: 0 Minutes       Date: 2021  Patient Name: Zoe Stahl      CSN: [id number]   : 1970  (48 y.o.)  Gender: male   Referring Physician:  Rupa Foss DO  Diagnosis: Follicular neoplasm of thyroid   Secondary Diagnosis: Dysphagia, cognition    History of Present Illness/Injury: Per chart review; Adin Mike is a 49 y/o male lifetime non-smoker, with a medical history of hyperlipidemia, hypertension, restless leg syndrome, chronic back pain, irritable bowel syndrome, seizure disorder, neuropathy, type 2 diabetes mellitus, anemia, chronic bronchitis, acid reflux, liver disease, depression, bipolar disorder, sleep apnea, pancreatic insufficiency, systolic dysfunction, and obesity.     Patient presented to [facility] for a thyroid lobectomy.  Patient has a history of a follicular neoplasm of the thyroid and Dr. Ramirez Amour the thyroid lobectomy on 1/15/2021, the patient tolerated the procedure well.  Postoperatively, the patient remained hemodynamically stable and was transitioned to the stepdown unit. The hospitalist service was consulted for medical management.    Past Medical History:   Diagnosis Date    Abnormal thyroid biopsy     Acid reflux     Anemia     resolved - previously seen by Dr. Hiren Baptiste (due to enlarged spleen)    Anesthesia     seizures with brain surgery due to blood sugar got really high    Bipolar disorder ([facility] [address]     Blood clot in vein 2016    [place]     Cancer [facility]) 2019    thyroid    Chest pain     previously seeing [facility] cardiologist, now seeing Dr. Leo Delvalle (LAD bridging on cath 2016)    Chronic back pain     Chronic bronchitis ([facility] [address]     Dr. Patrick Bocanegra Colon polyp 2016    Depression     seeing Linsey Davis DVT (deep venous thrombosis) ([facility] [address] [id number]    not on Coumadin due to unable to regulate - Dr. Rochelle Quiroga - no etiology found per patient    Esophageal abnormality     nodule     Fatty liver disease, nonalcoholic     U/S  [facility]    Furuncle     legs    History of Doppler ultrasound 2011    No hemodynamically significant carotid stenosis is identified. A thyroid nodule on each side. Dedicated ultrasound of thyroid gland is suggested to further evaluate if clinically indicated.  History of pulmonary embolism     s/p GFF, related to knee surgery    Hx of blood clots     leg and lung    Hyperlipidemia     severely elevated triglycerides    Hypertension     diastolic    Intracranial arachnoid cyst 2012    Dr. Mallika Torres drained, complicated with seizures, DKA    Irritable bowel syndrome     Liver disease     Francyne Man - elevated LFT - positive smooth muscle antibody, steatosis per liver bx 3/2014 with Dr. Carol Chen (multiple drug resistant organisms) resistance     MRSA (methicillin resistant staph aureus) culture positive     h/o in foot and before brain surgery    Nephrolithiasis     noted on CT abdomen 2016, 2019    Neuropathy     Pancreatic insufficiency     Positive SANDY (antinuclear antibody)     Dr. Nyla Soler - first visit in 2013    Prolonged emergence from general anesthesia     Restless legs syndrome     Seizures ([facility] [address]     started with brain surgery    Sinus tachycardia     Holter 3/2013 - seeing Dr. Boaz Antony [facility])     clips     Sleep apnea     positive sleep apnea per study 10/2020.  Systolic dysfunction     \"systolic bridge\"  EF 60% ECHO 2019    Type II or unspecified type diabetes mellitus without mention of complication, not stated as uncontrolled        SUBJECTIVE:  Patient is sleeping heavily upon ST arrival. Pt required mod verbal, visual, and tactile cues to wake up for BSE. Recommended to hold cognitive evaluation at this time.  Anticipate completion on 1/25/21. OBJECTIVE:    Pain:  No pain reported. Current Diet: Minced & moist solids w/ thin liquids     Respiratory Status:  Independent    Behavioral Observation:  Confused, Lethargic and Limited Direction Following    Oral Mechanism Evaluation:      Facial / Labial WFL    Lingual WFL    Dentition Impaired Edentulous    Velum Not Tested    Vocal Quality WFL    Sensation Not Tested    Cough Not Tested      Patient Evaluated Using: Thin via cup/straw/consecutive, puree, minced, and soft solid     Oral Phase:  Impaired:  Impaired AP Movement, Impaired Mastication, Reduced Bolus Formation and Impaired Oral Initiation    Pharyngeal Phase: WFL:  Pharyngeal phase appears WFL but cannot rule out pharyngeal phase deficits from a bedside swallowing evaluation alone. Signs and Symptoms of Laryngeal Penetration/Aspiration: No signs/symptoms of aspiration evident in this evaluation, but cannot rule out silent aspiration. Impresssions: Patient seen with RN permission on this date. The pt presented with Mild to Moderate Oral dysphagia characterized by prolonged mastication impacted by lethargy, cognitive decline, and edentulous oral cavity, mild decreased bolus formation, requiring a liquid wash. Bolus control appears to be intact and functional. Pharyngeal phase appears WFL but cannot rule out pharyngeal phase deficits from a bedside swallowing evaluation alone. Instrumental is NOT warranted at this time. No overt s/s of aspiraiton/penetration across all PO trials. Recommended to resume consumption of minced & moist solids w/ thin liquids with close SUP. RECOMMENDATIONS/ASSESSMENT:   Modified Barium Swallow:  MBS is not indicated at this time.   Will recommend as appropriate  Diet Recommendations: minced & moist solids w/ thin liquids  Strategies:  Full Upright Position, Small Bite/Sip, Direct 1:1 Supervision, Alternate Solids and Liquids and Limit Distractions   Rehabilitation Potential: GOOD EDUCATION:  Learner: Patient and Significant Other  Education:  Reviewed results and recommendations of this evaluation, Reviewed diet and strategies, Reviewed ST goals and Plan of Care and Reviewed recommendations for follow-up  Evaluation of Education: Needs further instruction and No evidence of learning    PLAN:  Speech Therapy evaluation to assess speech, language, cognition and/or voice and Skilled SLP intervention on acute care 3-5 x per week or until goals met and/or pt plateaus in function. Specific interventions for next session may include: PO trials . PATIENT GOAL:    Return to prior level of function. SHORT TERM GOALS:  Short-term Goals  Timeframe for Short-term Goals: 2 weeks  Goal 1: Patient will tolerate minced & moist solids with thin liquids with no overt s/s of aspiration/penetration to demosntrate safe meal consumption  INTERVENTION:  Completed review/education of the following safe swallowing strategies/precautions--  *Upright  *Single bites/drinks  *Alternate liquids/solids  *Straw Okay   *Fully Masticate Solids  *Meds 1 at a time   *Stop PO intake if coughing and/or changes in respiratory status  *Direct SUP for set up assist and to ensure pt is alert for meals. Goal 2: Patient will complete advanced PO trials of solids with tmiely oral phase of the swallow and no overt s/s of aspiration/penetration to demonstrate readiness for a diet upgrade  INTERVENTION:  Completed pt and family education re: diet recommendations and ST rationale for holding a advanced PO trials  (soft & bite size and regular solids) on this date. The pt will require reinforcement, but his wife demonstrated EXCELLENT understanding and agreement. Pt not appropriate d/t aforementioned deficits. Goal 3: Patient will complete cognitive lingusitic evaluation at appropriate.   INTERVENTION:  Completed pt and family education re: diet recommendations and ST rationale for holding cognitive linguistic evaluation d/t increased lethargy, minimal verbalizations highly suspected d/t acute medical changes. *Pt's wife in agreement to hold cognitive evaluation   *If the patient plans to d/c prior to completion of the cognitive evaluation, ST recommended MD refer for a Outpatient Speech Therapy consult to complete a follow-up evaluation. -PLOF: active , independently manages finances, medications, and babysits his grandchildren prior to this admission. LONG TERM GOALS:  No LTM Goals recommended, due to anticipated short ELOS. Kasey Jorge MA., CCC-SLP

## 2021-01-23 NOTE — CONSULTS
Psychiatry Consult               1-  Reason for Consult: Depression     CC: Bipolar 1 D/O MRE Depressed     HPI   Thalia Quinn is 47 y/o male seen on [facility] medical unit per request for consult from psychiatry due to Depression. Wife Zackery Kennedy is present during interview and is primary source of imformation as Thalia Quinn appears with some confusion. Hector appears with some lethargy and is oriented to person, place and year. Diufficulties with month and birthdate. Thalia Quinn has multiple medical issues and has Hemithyroid Follicular Neoplasm. Also wife reports Thalia Quinn has complex seizure and is being foolowed by Neurology at [facility]. Mat is being seen by Dr Eliseo Barber, Psychiatrist, at [facility] for Bipolar D/O with Symbalta and Depakote. DR. Hernandez is reported by wife to be dong well on these meds. When asked client about feeling depressed. Reports feeling \"tired. \" Denies feelings of harm towards self or others. Hector indicated he likes his meds. Zackery Kennedy reports that Hector;s current condition with confusion is due to having a seizure and is in post ectal phase. Zackery Kennedy reports client's post ectal phase can sometimes last 4-5 days and reports client appears depressed, lethargic and confused when in this phase. Zackery Kennedy reports Hector's orientation is better everyday. AMERICA done 1-22-21 is low 32.8. Client appears to be stabel at subsntherapeutic level. Tacho laso order ammonia level     Past Psychiatric Hx:  Denies any past suicidal gestures. Denies any past psychiatric hospitalizations. Currently under care of Dr Eliseo Barber, Psychiatrist at [facility].  Rxed Cymbalta 120mg po q am and Depakote DR 500mg po BID         Past Medical History:   Diagnosis Date    Abnormal thyroid biopsy     Acid reflux     Anemia     resolved - previously seen by Dr. Cipriano Whatley (due to enlarged spleen)    Anesthesia     seizures with brain surgery due to blood sugar got really high    Bipolar disorder ([facility] [address]     Blood clot in vein 04/07/2016    Nathaniel Portia     Cancer [facility]) 04/2019    thyroid    Chest pain     previously seeing [facility] cardiologist, now seeing Dr. Jose Kyle (LAD bridging on cath 4/2016)    Chronic back pain     Chronic bronchitis ([facility] [address]     Dr. Jose Moeller Colon polyp 08/30/2016    Depression     seeing Farhan Monsalve DVT (deep venous thrombosis) ([address] [id number]    not on Coumadin due to unable to regulate - Dr. Nohelia Ramirez - no etiology found per patient    Esophageal abnormality     nodule     Fatty liver disease, nonalcoholic     U/S 53/2531 [facility]    Furuncle     legs    History of Doppler ultrasound 05/29/2011    No hemodynamically significant carotid stenosis is identified. A thyroid nodule on each side. Dedicated ultrasound of thyroid gland is suggested to further evaluate if clinically indicated.  History of pulmonary embolism 2007    s/p GFF, related to knee surgery    Hx of blood clots     leg and lung    Hyperlipidemia     severely elevated triglycerides    Hypertension     diastolic    Intracranial arachnoid cyst 11/2012    Dr. Kraig Nevarez drained, complicated with seizures, DKA    Irritable bowel syndrome     Liver disease     [facility] - elevated LFT - positive smooth muscle antibody, steatosis per liver bx 3/2014 with Dr. Azalia Hoskins (multiple drug resistant organisms) resistance 2012    MRSA (methicillin resistant staph aureus) culture positive     h/o in foot and before brain surgery    Nephrolithiasis     noted on CT abdomen 9/2016, 6/2019    Neuropathy     Pancreatic insufficiency     Positive SANDY (antinuclear antibody)     Dr. Cherrie Melendez - first visit in 6/2013    Prolonged emergence from general anesthesia     Restless legs syndrome     Seizures ([facility] [address]     started with brain surgery    Sinus tachycardia     Holter 3/2013 - seeing Dr. Miller Favors fracture [facility])     clips     Sleep apnea     positive sleep apnea per study 10/2020.     Systolic dysfunction     \"systolic bridge\"  EF 77% ECHO Reported to have complex seizures        Impression:  Bipolar 1 Disorder MRE Depressed      Plan:   Continue Current meds as ordered. Depression appears more consistent with post ectal phase of complex seizure. No additional psychotropic medication will be added. Nor dose adjustments made  Advise wife to have client continue F/U with Dr Samara Ramirez at [facility] for medication management. Will order ammonia level   Medical continue to observe and call psychiatry with any questions or concerns.     Gabriela Jacobsen, CNP 9-

## 2021-01-23 NOTE — PROGRESS NOTES
Value Date    ALKPHOS 303 01/23/2021     01/23/2021    AST 29 01/23/2021    BILITOT 1.1 01/23/2021    BILIDIR 0.5 01/21/2021    LABALBU 4.2 01/23/2021    LABALBU 4.3 03/23/2012    AMYLASE 29 09/01/2020    LIPASE 20.3 01/20/2021     Current Meds:  Scheduled Meds:   senna  2 tablet Oral Once    polyethylene glycol  17 g Oral Once    levETIRAcetam  2,000 mg Oral BID    lacosamide  300 mg Oral BID    metoprolol tartrate  75 mg Oral BID    insulin glargine  35 Units Subcutaneous BID    insulin lispro  0-18 Units Subcutaneous TID WC    insulin lispro  0-9 Units Subcutaneous Nightly    neomycin-bacitracin-polymyxin   Topical 3 times per day    [Held by provider] atorvastatin  10 mg Oral Daily    vitamin B-12  2,500 mcg Oral Daily    divalproex  500 mg Oral BID    DULoxetine  120 mg Oral Daily    empagliflozin  25 mg Oral Daily    [Held by provider] fenofibrate  160 mg Oral Daily    [Held by provider] insulin lispro  30 Units Subcutaneous TID AC    pantoprazole  40 mg Oral BID    rOPINIRole  1 mg Oral TID    zonisamide  400 mg Oral Nightly     Continuous Infusions:   dextrose         Assessment:  49 yo M admitted 01/15/21 for scheduled thyroid lobectomy due to follicular neoplasm of the thyroid. He is having a lot of throat pain & taking little in PO. GI consulted for elevated LFTs. Unable to get MRCP due to claustrophobia, even with Ativan. CT abdomen/pelvis unremarkable other than for splenomegaly. US Liver unremarkable. 1. Follicular neoplasm of the thyroid s/p thyroid lobectomy 01/15/21  2. Elevated LFTs- improving. 3. Seizure disorder  4. DM  5. H/O HTN  6. GERD  7. HLD  8. Depression, bipolar disorder    Plan:    · Monitor hepatic labs  · Avoid hepatotoxic meds - LFTs trending down; review and restart patient's chronic antiseizure/anticonvulsant meds as he is at higher risk of seizures than he is of hepatotoxicity complications from his chronic regimen.    · Pain control per primary  · Continue PO PPI BID, home dose  · Consult dietitian  · Case discussed with resident Dr. Drake Shepard  · Supportive care per primary team  Will follow    Case reviewed and impression/plan reviewed in collaboration with Dr. Radha Calloway  Electronically signed by Marilou Dawn MD on 1/23/2021 at 10:10 AM    [facility]     If there are any questions or concerns this weekend, please call Dr. Radha Calloway as he is covering for [facility].

## 2021-01-23 NOTE — PROGRESS NOTES
Hospitalist Progress Note    Patient:  Milton Gresham      Unit/Bed:4K-24/024-A    YOB: 1970    MRN: [id number]       Acct: [de-identified]     PCP: Gale Currie MD    Date of Admission: 1/15/2021    Assessment/Plan:    Acute transaminitis - Unclear etiology. Alk phos=228, repeat 220, AST=78, repeat 74, ALT=75, repeat 71. Patient has not been hypotensive to indicate ischemic causes nor had any direct hepatic injury from new medications. Patient denies abdominal pain however this may be masked by pain medication. Patient has had cholecystectomy in 2004. - Hepatic Ultrasound  1/21/21 - Worsening transaminitis. Alk phos=416, ALT=243, USQ=671, bilirubin=2.8, lipase=20.3. No gross abnormality found on liver ultrasound yesterday however the pancreas was not visualized secondary to bowel gas. Etiology unclear at this time however the [facility] Ratio indicates cholestatic with a value of R<2. Obtain INR and direct bilirubin lab. GI Consulted. Recommend MRCP.  1/22/21 - Improved today with downtrending AST, ALT, Alk Phos. The most likely etiology is biliary stone given the acute onset and improvement. However acute hepatic injury secondary to Depakote use cannot be excluded despite his longstanding use of the medication. Recommend attempting to repeat MRCP today if possible otherwise CT abd/pelvis. Valproic acid level will be obtain today. Recommend free valproic acid level test. Patient should hold statin and fenofibrate at discharge and obtain repeat LFT's as outpatient. 1/23/21 -liver function tests have improved the patient is still being followed by gastroenterology. Depakote continuing to be held in case of possibility for hepatic injury. Suspicions are high that the patient had a biliary obstruction which is now self resolved. Continue to trend LFTs with possibility of MRCP on 1/25/2021 with sedation if necessary. 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  Patient tolerated surgery well.  ENT attending/managing. 1/16: pt not taking PO in any significant capacity, encouraged use of viscous lidocaine  1/17: continuing to encourage PO intake  1/20/21 - Patient has increased PO intake over last 24 hours, however he is still being encouraged to continue. 1/21/21 - ENT transferred service to hospitalist for further care and management. 1/23/21 - Labs obtained demonstrating TSH, free T4, ionized calcium, and calcium all within normal limits. Seizure disorder: Complex history with dynamic seizure medication dosing per primary.  Continue home doses of Depakote, Keppra, zonisamide, and lacosamide.  Continue to monitor closely, maintain seizure precautions. 1/16: pt refusing to take PO, discussed the need for this with him, but in the meantime give IV vimpat and keppra - no IV formulation of Zonegran available (pt on depakote for mood, not sz)  1/17: did not take zonegran last night, encouraging he do so tonight and continue with IV for now  1/18: says he will \"try\" to take Dyana Javier, did feel like he had \"semi seizure\" early this AM but is fine now  1/19: did not take Zonegran, we continue to encourage PO intake. 1/20/21: Patient was able to take all medication this morning. PO intake was encouraged and he was counseled on importance of compliance. 1/23/21 - Psychiatry and neurology consulted. EEG ordered. Continue to hold Depakote. It appears the patient received 1 dose Keppra IV and 1 dose of Vimpat IV overnight. The reason for this administration route is unclear via review of the EMR suspicion is that the patient was lethargic and unable to take p.o. medication but this is unconfirmed.     Type 2 diabetes mellitus: Hemoglobin A1c 9.3 on 12/22/2020. Brianna Cunningham blood glucose level at 225.  Start Accu-Cheks before meals and at bedtime with sliding scale insulin coverage (low-dose corrective algorithm).  Hold Lantus and additional mealtime coverage insulin until patient is able to tolerate more substantial diet; continue Jardiance.  Hypoglycemia protocol in place, maintain carb controlled diet when cleared by primary. 1/16: resume lantus at 50 units BID and monitor, continue with just a SSI, not his typical high dose of mealtime coverage for now  1/17: glucose stable so far, continue to adjust as PO intake increases    Essential hypertension: History, not at goal. Gwinda Gretchen secondary to pain response.  Continue metoprolol.  Labetalol available PRN for SBP >165. 1/16: as above, not taking PO, trying to encourage this    Hyperlipidemia: Noted. Continue fenofibrate and atorvastatin. GERD: Continue Protonix. Depression, bipolar disorder: Continue Cymbalta. 1/23/21 -continue holding Depakote secondary to increased liver function labs. Psychiatry has been consulted and will follow their recommendation. Restless leg syndrome: Continue Requip. Sleep apnea: History, noted. Obesity: BMI 32.46        Expected discharge date:  1/21/21    Disposition:    [x] Home       [] TCU       [] Rehab       [] Psych       [] SNF       [] [place]       [] Other-    Chief Complaint: Thyroid [address] Course: \"Nicolas Sow is a 66-year-old  male, lifetime non-smoker, with a medical history of hyperlipidemia, hypertension, restless leg syndrome, chronic back pain, irritable bowel syndrome, seizure disorder, neuropathy, type 2 diabetes mellitus, anemia, chronic bronchitis, acid reflux, liver disease, depression, bipolar disorder, sleep apnea, pancreatic insufficiency, systolic dysfunction, and obesity. Patient presented to [facility] for a thyroid lobectomy. Vicky Banks has a history of a follicular neoplasm of the thyroid and Dr. Dallin Kathleen the thyroid lobectomy on 1/15/2021, the patient tolerated the procedure well.  Postoperatively, the patient remained hemodynamically stable and was transitioned to the stepdown unit.  The hospitalist service was consulted for medical management. \"    1/16/21: IV seizure medications for now and monitor   1/17/21: continue with IV medications and encouraging PO intake  1/19/21: continue to encourage PO intake, hopeful discharge tomorrow    1/20/21 - Patient had flat affect during examination today. He did not seem to have much interest in discussing his plan of care and continued complaint of mouth pain. He stated that it is still troublesome for him to eat despite current pain medication. The patient had elevated liver enzymes. 1/21/21 - MRCP attempted for transaminitis however the patient was unable to complete the exam despite giving 0.5mg ativan secondary to claustrophobia. 1/22/21 - No acute events over night per nursing. Patient remains with flat affect and had limited conversation during examination. The patient denied chest pain, shortness of breath, abdominal pain. He admits to not having a bowel movement and reports continued mouth pain. Plan for CT abdomen and CT head (requested by wife). 1/23/21 - patient has been appearing more withdrawn and acutely lethargic over the past 48 hours. There has been slowing in cognition as well as mild confusion. The patient's wife states that he is appearing as though he might be in a postictal state as she knows his seizure disorder best. Given the patient's PMHx of depression and bipolar disorder eliminating medical causes as his presentation was appropriate as well as incorporating consultations from both psychiatry and neurology. Depakote continues to be held secondary to elevated liver function tests even though he uses this medication for mood stabilization it may have been playing some minor role in stabilizing seizure activity as well.  The patient is having improved liver function enzymes today with recommendation from gastroenterology for MRCP if needed on Monday, 1/25/2021 with procedural sedation as the patient is claustrophobic and cannot undergo the procedure otherwise. Patient was consulted by speech therapy with recommendations for continuation of minced and moist solids with thin liquids with close supervision into the patient becomes more alert and oriented. Subjective (past 24 hours):  Patient was inattentive, uncooperative and lethargic during examination and interview today. The patient's wife was at bedside and the plan of care was discussed for additional consultation from psychiatry and neurology to determine if this current state is of worsening depression or underlying seizure activity. The patient's wife was in agreement with the course and plan of care. ROS (12 point review of systems completed. Pertinent positives noted. Otherwise ROS is negative).     Medications:  Reviewed    Infusion Medications    dextrose       Scheduled Medications    senna  2 tablet Oral Once    polyethylene glycol  17 g Oral Once    levETIRAcetam  2,000 mg Oral BID    lacosamide  300 mg Oral BID    metoprolol tartrate  75 mg Oral BID    insulin glargine  35 Units Subcutaneous BID    insulin lispro  0-18 Units Subcutaneous TID WC    insulin lispro  0-9 Units Subcutaneous Nightly    neomycin-bacitracin-polymyxin   Topical 3 times per day    [Held by provider] atorvastatin  10 mg Oral Daily    vitamin B-12  2,500 mcg Oral Daily    divalproex  500 mg Oral BID    DULoxetine  120 mg Oral Daily    empagliflozin  25 mg Oral Daily    [Held by provider] fenofibrate  160 mg Oral Daily    [Held by provider] insulin lispro  30 Units Subcutaneous TID AC    pantoprazole  40 mg Oral BID    rOPINIRole  1 mg Oral TID    zonisamide  400 mg Oral Nightly     PRN Meds: potassium chloride **OR** potassium alternative oral replacement **OR** potassium chloride, magic (miracle) mouthwash, LORazepam, glucose, dextrose, glucagon (rDNA), dextrose, labetalol, promethazine, HYDROcodone-acetaminophen **OR** oxyCODONE-acetaminophen **OR** oxyCODONE-acetaminophen, ondansetron      Intake/Output Summary (Last 24 hours) at 1/23/2021 1120  Last data filed at 1/23/2021 0314  Gross per 24 hour   Intake 289.4 ml   Output 1375 ml   Net -1085.6 ml       Diet:  DIET CARB CONTROL; Dysphagia Minced and Moist  Dietary Nutrition Supplements: Frozen Oral Supplement  Dietary Nutrition Supplements: Wound Healing Oral Supplement    Exam:  /85   Pulse 95   Temp 98.3 °F (36.8 °C) (Oral)   Resp 16   Ht 6' 2\" (1.88 m)   Wt 220 lb 14.4 oz (100.2 kg)   SpO2 95%   BMI 28.36 kg/m²     Physical Exam  Vitals signs and nursing note reviewed. Constitutional:       Appearance: Normal appearance. He is obese. HENT:      Head: Normocephalic and atraumatic. Right Ear: External ear normal.      Left Ear: External ear normal.      Nose: Nose normal.      Mouth/Throat:      Mouth: Mucous membranes are moist.      Pharynx: Oropharynx is clear. Eyes:      Conjunctiva/sclera: Conjunctivae normal.      Pupils: Pupils are equal, round, and reactive to light. Neck:      Musculoskeletal: Normal range of motion and neck supple. Cardiovascular:      Rate and Rhythm: Normal rate and regular rhythm. Pulses: Normal pulses. Heart sounds: Normal heart sounds. Pulmonary:      Effort: Pulmonary effort is normal.      Breath sounds: Normal breath sounds. Abdominal:      General: Bowel sounds are normal.      Palpations: Abdomen is soft. Musculoskeletal: Normal range of motion. Skin:     General: Skin is warm and dry. Capillary Refill: Capillary refill takes less than 2 seconds. Neurological:      General: No focal deficit present. Mental Status: Mental status is at baseline. He is lethargic, disoriented and confused. Psychiatric:         Attention and Perception: Attention and perception normal.         Mood and Affect: Mood is depressed. Affect is flat. Speech: He is noncommunicative.          Behavior: Behavior is uncooperative, using voice recognition software. It may contain minor errors which are inherent in voice recognition technology. **      Final report electronically signed by Dr. Pari Sheehan on 1/20/2021 3:58 PM          DVT prophylaxis: [] Lovenox                                 [x] SCDs                                 [] SQ Heparin                                 [] Encourage ambulation           [] Already on Anticoagulation     Code Status: Full Code      Tele:   [x] yes             [] no    Electronically signed by Raj Crystal DO on 1/23/2021 at 11:20 AM

## 2021-01-24 LAB
ALBUMIN SERPL-MCNC: 3.9 G/DL (ref 3.5–5.1)
ALP BLD-CCNC: 270 U/L (ref 38–126)
ALT SERPL-CCNC: 84 U/L (ref 11–66)
ANION GAP SERPL CALCULATED.3IONS-SCNC: 10 MEQ/L (ref 8–16)
AST SERPL-CCNC: 21 U/L (ref 5–40)
BILIRUB SERPL-MCNC: 1 MG/DL (ref 0.3–1.2)
BUN BLDV-MCNC: 24 MG/DL (ref 7–22)
CALCIUM SERPL-MCNC: 9.9 MG/DL (ref 8.5–10.5)
CHLORIDE BLD-SCNC: 105 MEQ/L (ref 98–111)
CO2: 27 MEQ/L (ref 23–33)
CREAT SERPL-MCNC: 0.9 MG/DL (ref 0.4–1.2)
ERYTHROCYTE [DISTWIDTH] IN BLOOD BY AUTOMATED COUNT: 12.8 % (ref 11.5–14.5)
ERYTHROCYTE [DISTWIDTH] IN BLOOD BY AUTOMATED COUNT: 41.3 FL (ref 35–45)
GFR SERPL CREATININE-BSD FRML MDRD: 89 ML/MIN/1.73M2
GLUCOSE BLD-MCNC: 114 MG/DL (ref 70–108)
GLUCOSE BLD-MCNC: 150 MG/DL (ref 70–108)
GLUCOSE BLD-MCNC: 181 MG/DL (ref 70–108)
GLUCOSE BLD-MCNC: 197 MG/DL (ref 70–108)
GLUCOSE BLD-MCNC: 212 MG/DL (ref 70–108)
HCT VFR BLD CALC: 48.6 % (ref 42–52)
HEMOGLOBIN: 17 GM/DL (ref 14–18)
IGA: 188 MG/DL (ref 70–400)
MAGNESIUM: 2 MG/DL (ref 1.6–2.4)
MCH RBC QN AUTO: 31 PG (ref 26–33)
MCHC RBC AUTO-ENTMCNC: 35 GM/DL (ref 32.2–35.5)
MCV RBC AUTO: 88.5 FL (ref 80–94)
PLATELET # BLD: 253 THOU/MM3 (ref 130–400)
PMV BLD AUTO: 9.5 FL (ref 9.4–12.4)
POTASSIUM REFLEX MAGNESIUM: 3.5 MEQ/L (ref 3.5–5.2)
RBC # BLD: 5.49 MILL/MM3 (ref 4.7–6.1)
SODIUM BLD-SCNC: 142 MEQ/L (ref 135–145)
TOTAL PROTEIN: 7 G/DL (ref 6.1–8)
WBC # BLD: 7.3 THOU/MM3 (ref 4.8–10.8)

## 2021-01-24 PROCEDURE — 6370000000 HC RX 637 (ALT 250 FOR IP): Performed by: OTOLARYNGOLOGY

## 2021-01-24 PROCEDURE — 2060000000 HC ICU INTERMEDIATE R&B

## 2021-01-24 PROCEDURE — 99232 SBSQ HOSP IP/OBS MODERATE 35: CPT | Performed by: INTERNAL MEDICINE

## 2021-01-24 PROCEDURE — 6370000000 HC RX 637 (ALT 250 FOR IP): Performed by: PSYCHIATRY & NEUROLOGY

## 2021-01-24 PROCEDURE — 82948 REAGENT STRIP/BLOOD GLUCOSE: CPT

## 2021-01-24 PROCEDURE — 6370000000 HC RX 637 (ALT 250 FOR IP): Performed by: INTERNAL MEDICINE

## 2021-01-24 PROCEDURE — 83735 ASSAY OF MAGNESIUM: CPT

## 2021-01-24 PROCEDURE — 6370000000 HC RX 637 (ALT 250 FOR IP): Performed by: PHYSICIAN ASSISTANT

## 2021-01-24 PROCEDURE — 36415 COLL VENOUS BLD VENIPUNCTURE: CPT

## 2021-01-24 PROCEDURE — APPNB30 APP NON BILLABLE TIME 0-30 MINS: Performed by: NURSE PRACTITIONER

## 2021-01-24 PROCEDURE — 85027 COMPLETE CBC AUTOMATED: CPT

## 2021-01-24 PROCEDURE — 80053 COMPREHEN METABOLIC PANEL: CPT

## 2021-01-24 RX ADMIN — GABAPENTIN 600 MG: 300 CAPSULE ORAL at 17:29

## 2021-01-24 RX ADMIN — METOPROLOL TARTRATE 75 MG: 25 TABLET ORAL at 08:52

## 2021-01-24 RX ADMIN — METOPROLOL TARTRATE 75 MG: 25 TABLET ORAL at 21:02

## 2021-01-24 RX ADMIN — DULOXETIN HYDROCHLORIDE 120 MG: 60 CAPSULE, DELAYED RELEASE ORAL at 08:48

## 2021-01-24 RX ADMIN — LACOSAMIDE 300 MG: 200 TABLET ORAL at 21:15

## 2021-01-24 RX ADMIN — ROPINIROLE 1 MG: 1 TABLET, FILM COATED ORAL at 21:16

## 2021-01-24 RX ADMIN — BACITRACIN ZINC NEOMYCIN SULFATE POLYMYXIN B SULFATE: 400; 3.5; 5 OINTMENT TOPICAL at 05:04

## 2021-01-24 RX ADMIN — ZONISAMIDE 400 MG: 100 CAPSULE ORAL at 21:16

## 2021-01-24 RX ADMIN — BACITRACIN ZINC NEOMYCIN SULFATE POLYMYXIN B SULFATE: 400; 3.5; 5 OINTMENT TOPICAL at 13:00

## 2021-01-24 RX ADMIN — GABAPENTIN 600 MG: 300 CAPSULE ORAL at 21:02

## 2021-01-24 RX ADMIN — GABAPENTIN 600 MG: 300 CAPSULE ORAL at 12:59

## 2021-01-24 RX ADMIN — GABAPENTIN 600 MG: 300 CAPSULE ORAL at 08:49

## 2021-01-24 RX ADMIN — PANTOPRAZOLE SODIUM 40 MG: 40 TABLET, DELAYED RELEASE ORAL at 21:16

## 2021-01-24 RX ADMIN — ROPINIROLE 1 MG: 1 TABLET, FILM COATED ORAL at 08:53

## 2021-01-24 RX ADMIN — LEVETIRACETAM 2000 MG: 500 TABLET ORAL at 08:51

## 2021-01-24 RX ADMIN — ROPINIROLE 1 MG: 1 TABLET, FILM COATED ORAL at 13:00

## 2021-01-24 RX ADMIN — INSULIN GLARGINE 35 UNITS: 100 INJECTION, SOLUTION SUBCUTANEOUS at 21:06

## 2021-01-24 RX ADMIN — BACITRACIN ZINC NEOMYCIN SULFATE POLYMYXIN B SULFATE: 400; 3.5; 5 OINTMENT TOPICAL at 21:00

## 2021-01-24 RX ADMIN — LEVETIRACETAM 2000 MG: 500 TABLET ORAL at 21:15

## 2021-01-24 RX ADMIN — PANTOPRAZOLE SODIUM 40 MG: 40 TABLET, DELAYED RELEASE ORAL at 08:53

## 2021-01-24 RX ADMIN — LACOSAMIDE 300 MG: 200 TABLET ORAL at 08:50

## 2021-01-24 RX ADMIN — Medication 2500 MCG: at 08:53

## 2021-01-24 RX ADMIN — INSULIN GLARGINE 35 UNITS: 100 INJECTION, SOLUTION SUBCUTANEOUS at 09:02

## 2021-01-24 NOTE — PROGRESS NOTES
Patient was to lethargic to take oral evening meds. He was given IV Keppra and Vimpat. Patient was only arousal enough to answer one word questions. Patient could only perform a few commands such as: hand grasps and dorsi/plantar flexion of lower extremities. Patient becomes increasingly lethargic in the evening versus the morning and daytime. Patient will wake out of a sleep and be alert and oriented x2 (to person and place). When he awakes it is typically to urinate. After using the restroom he immediately goes back to sleep. This is the second consecutive night this patient has been under my care and I have observed these characteristics both nights. Vital signs remain stable. Will continue to monitor.

## 2021-01-24 NOTE — PROGRESS NOTES
Hospitalist Progress Note    Patient:  Alejandrina Hayes      Unit/Bed:4K-24/024-A    YOB: 1970    MRN: [id number]       Acct: [de-identified]     PCP: Fidel Johansen MD    Date of Admission: 1/15/2021    Assessment/Plan:    Acute transaminitis - Unclear etiology. Alk phos=228, repeat 220, AST=78, repeat 74, ALT=75, repeat 71. Patient has not been hypotensive to indicate ischemic causes nor had any direct hepatic injury from new medications. Patient denies abdominal pain however this may be masked by pain medication. Patient has had cholecystectomy in 2004. - Hepatic Ultrasound  1/21/21 - Worsening transaminitis. Alk phos=416, ALT=243, IUV=978, bilirubin=2.8, lipase=20.3. No gross abnormality found on liver ultrasound yesterday however the pancreas was not visualized secondary to bowel gas. Etiology unclear at this time however the [facility] Ratio indicates cholestatic with a value of R<2. Obtain INR and direct bilirubin lab. GI Consulted. Recommend MRCP.  1/22/21 - Improved today with downtrending AST, ALT, Alk Phos. The most likely etiology is biliary stone given the acute onset and improvement. However acute hepatic injury secondary to Depakote use cannot be excluded despite his longstanding use of the medication. Recommend attempting to repeat MRCP today if possible otherwise CT abd/pelvis. Valproic acid level will be obtain today. Recommend free valproic acid level test. Patient should hold statin and fenofibrate at discharge and obtain repeat LFT's as outpatient. 1/23/21 -liver function tests have improved the patient is still being followed by gastroenterology. Depakote continuing to be held in case of possibility for hepatic injury. Suspicions are high that the patient had a biliary obstruction which is now self resolved. Continue to trend LFTs with possibility of MRCP on 1/25/2021 with sedation if necessary.     1/24/21 - LFT improving, however not back to baseline- anti lipidemic and depakote still on hold. 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  Patient tolerated surgery well.  ENT attending/managing. 1/16: pt not taking PO in any significant capacity, encouraged use of viscous lidocaine  1/17: continuing to encourage PO intake  1/20/21 - Patient has increased PO intake over last 24 hours, however he is still being encouraged to continue. 1/21/21 - ENT transferred service to hospitalist for further care and management. 1/23/21 - Labs obtained demonstrating TSH, free T4, ionized calcium, and calcium all within normal limits. Seizure disorder: Complex history with dynamic seizure medication dosing per primary.  Continue home doses of Depakote, Keppra, zonisamide, and lacosamide.  Continue to monitor closely, maintain seizure precautions. 1/16: pt refusing to take PO, discussed the need for this with him, but in the meantime give IV vimpat and keppra - no IV formulation of Zonegran available (pt on depakote for mood, not sz)  1/17: did not take zonegran last night, encouraging he do so tonight and continue with IV for now  1/18: says he will \"try\" to take Lacey Collet, did feel like he had \"semi seizure\" early this AM but is fine now  1/19: did not take Zonegran, we continue to encourage PO intake. 1/20/21: Patient was able to take all medication this morning. PO intake was encouraged and he was counseled on importance of compliance. 1/23/21 - Psychiatry and neurology consulted. EEG ordered. Continue to hold Depakote. It appears the patient received 1 dose Keppra IV and 1 dose of Vimpat IV overnight. The reason for this administration route is unclear via review of the EMR suspicion is that the patient was lethargic and unable to take p.o. medication but this is unconfirmed. 1/24/21- today more awake, talking. Neurontin was resumed by the neurologist.                       Warnell Pillion is still held. LFT improving.  Will continue to hold secondary to elevated liver function tests even though he uses this medication for mood stabilization it may have been playing some minor role in stabilizing seizure activity as well. The patient is having improved liver function enzymes today with recommendation from gastroenterology for MRCP if needed on Monday, 1/25/2021 with procedural sedation as the patient is claustrophobic and cannot undergo the procedure otherwise. Patient was consulted by speech therapy with recommendations for continuation of minced and moist solids with thin liquids with close supervision into the patient becomes more alert and oriented. 1/24/21- today more awake, talking. Neurontin was resumed by the neurologist. Angeles Galas is still held. LFT improving. Will continue to hold depakote since clinically pt is improving with just resuming neurontin and LFT is not back to normal as yet. Disposition plan- plan for discharge clair if ok with neuro. Subjective (past 24 hours): AAOx3  Pleasant, talking answering all questions appropriately  Denies chest pain/sob/nausea/vomiting     ROS (12 point review of systems completed. Pertinent positives noted. Otherwise ROS is negative).     Medications:  Reviewed    Infusion Medications    dextrose       Scheduled Medications    gabapentin  600 mg Oral 4x Daily    senna  2 tablet Oral Once    polyethylene glycol  17 g Oral Once    levETIRAcetam  2,000 mg Oral BID    lacosamide  300 mg Oral BID    metoprolol tartrate  75 mg Oral BID    insulin glargine  35 Units Subcutaneous BID    insulin lispro  0-18 Units Subcutaneous TID WC    insulin lispro  0-9 Units Subcutaneous Nightly    neomycin-bacitracin-polymyxin   Topical 3 times per day    [Held by provider] atorvastatin  10 mg Oral Daily    vitamin B-12  2,500 mcg Oral Daily    [Held by provider] divalproex  500 mg Oral BID    DULoxetine  120 mg Oral Daily    empagliflozin  25 mg Oral Daily    [Held by provider] fenofibrate  160 mg dry.      Capillary Refill: Capillary refill takes less than 2 seconds. Neurological:      General: No focal deficit present. Mental Status: Mental status is at baseline. He is lethargic, disoriented and confused. Psychiatric:         Attention and Perception: Attention and perception normal.         Mood and Affect: Mood is depressed. Affect is flat. Speech: He is noncommunicative. Behavior: Behavior is uncooperative, slowed and withdrawn. Labs:   Recent Labs     01/22/21  0333 01/23/21  0340 01/24/21  0325   WBC 8.2 9.3 7.3   HGB 17.8 17.9 17.0   HCT 50.4 50.7 48.6    277 253     Recent Labs     01/22/21  0333 01/23/21  0340 01/24/21  0325    143 142   K 4.3 3.7 3.5    107 105   CO2 19* 20* 27   BUN 17 20 24*   CREATININE 0.6 0.6 0.9   CALCIUM 9.2 9.6 9.9     Recent Labs     01/22/21  0333 01/23/21  0340 01/24/21  0325   AST 92* 29 21   * 116* 84*   BILITOT 1.8* 1.1 1.0   ALKPHOS 398* 303* 270*     Recent Labs     01/22/21  0333 01/23/21  0340   INR 1.15* 1.08     No results for input(s): CKTOTAL, TROPONINI in the last 72 hours. Microbiology:      Urinalysis:      Lab Results   Component Value Date    NITRU NEGATIVE 11/15/2020    WBCUA NONE 08/12/2019    WBCUA 6-10 10/17/2015    BACTERIA NONE 08/12/2019    RBCUA NONE 08/12/2019    BLOODU NEGATIVE 11/15/2020    SPECGRAV >1.030 08/12/2019    GLUCOSEU >= 1000 11/15/2020       Radiology:  CT ABDOMEN PELVIS W IV CONTRAST Additional Contrast? None   Final Result      1. Splenomegaly. 2. Cardiomegaly and bibasilar atelectasis. **This report has been created using voice recognition software. It may contain minor errors which are inherent in voice recognition technology. **      Final report electronically signed by Dr Eulalia Lazo on 1/22/2021 11:58 AM      CT HEAD WO CONTRAST   Final Result    No evidence of acute intracranial abnormality.                **This report has been created using voice recognition software. It may contain minor errors which are inherent in voice recognition technology. **      Final report electronically signed by Dr. Rober Cui MD on 1/22/2021 10:43 AM      US LIVER   Final Result   Somewhat limited evaluation does not demonstrate any gross abnormality of the liver            **This report has been created using voice recognition software. It may contain minor errors which are inherent in voice recognition technology. **      Final report electronically signed by Dr. Michael Flores on 1/20/2021 3:58 PM          DVT prophylaxis: [] Lovenox                                 [x] SCDs                                 [] SQ Heparin                                 [] Encourage ambulation           [] Already on Anticoagulation     Code Status: Full Code      Tele:   [x] yes             [] no    Electronically signed by Jeannie Matthews MD on 1/24/2021 at 4:55 PM

## 2021-01-24 NOTE — PROGRESS NOTES
Differential:    Lab Results   Component Value Date    WBC 7.3 01/24/2021    RBC 5.49 01/24/2021    RBC 4.93 03/26/2012    HGB 17.0 01/24/2021    HCT 48.6 01/24/2021     01/24/2021    MCV 88.5 01/24/2021    MCH 31.0 01/24/2021    MCHC 35.0 01/24/2021    RDW 13.5 06/05/2018    NRBC 0 12/29/2020    NRBC 0 01/06/2012    SEGSPCT 69.0 12/29/2020    LYMPHOPCT 31.9 09/22/2016    MONOPCT 6.1 12/29/2020    MONOPCT 6.0 09/22/2016    EOSPCT 6.2 09/22/2016    BASOPCT 0.7 09/22/2016    MONOSABS 0.5 12/29/2020    LYMPHSABS 1.8 12/29/2020    EOSABS 0.1 12/29/2020    BASOSABS 0.0 12/29/2020     BMP:    Lab Results   Component Value Date     01/24/2021    K 3.5 01/24/2021     01/24/2021    CO2 27 01/24/2021    BUN 24 01/24/2021    LABALBU 3.9 01/24/2021    LABALBU 4.3 03/23/2012    CREATININE 0.9 01/24/2021    CALCIUM 9.9 01/24/2021    LABGLOM 89 01/24/2021    GLUCOSE 114 01/24/2021    GLUCOSE 189 11/12/2015       Inpatient Medications  Current Facility-Administered Medications: gabapentin (NEURONTIN) capsule 600 mg, 600 mg, Oral, 4x Daily  senna (SENOKOT) tablet 17.2 mg, 2 tablet, Oral, Once  polyethylene glycol (GLYCOLAX) packet 17 g, 17 g, Oral, Once  levETIRAcetam (KEPPRA) tablet 2,000 mg, 2,000 mg, Oral, BID  lacosamide (VIMPAT) tablet 300 mg, 300 mg, Oral, BID  metoprolol tartrate (LOPRESSOR) tablet 75 mg, 75 mg, Oral, BID  insulin glargine (LANTUS) injection vial 35 Units, 35 Units, Subcutaneous, BID  potassium chloride (KLOR-CON M) extended release tablet 40 mEq, 40 mEq, Oral, PRN **OR** potassium bicarb-citric acid (EFFER-K) effervescent tablet 40 mEq, 40 mEq, Oral, PRN **OR** potassium chloride 10 mEq/100 mL IVPB (Peripheral Line), 10 mEq, Intravenous, PRN  insulin lispro (HUMALOG) injection vial 0-18 Units, 0-18 Units, Subcutaneous, TID WC  insulin lispro (HUMALOG) injection vial 0-9 Units, 0-9 Units, Subcutaneous, Nightly  magic (miracle) mouthwash, 5 mL, Mouth/Throat, PRN  neomycin-bacitracin-polymyxin (NEOSPORIN) ointment, , Topical, 3 times per day  [Held by provider] atorvastatin (LIPITOR) tablet 10 mg, 10 mg, Oral, Daily  vitamin B-12 (CYANOCOBALAMIN) tablet 2,500 mcg, 2,500 mcg, Oral, Daily  [Held by provider] divalproex (DEPAKOTE) DR tablet 500 mg, 500 mg, Oral, BID  DULoxetine (CYMBALTA) extended release capsule 120 mg, 120 mg, Oral, Daily  empagliflozin (JARDIANCE) tablet TABS 25 mg, 25 mg, Oral, Daily  [Held by provider] fenofibrate (TRIGLIDE) tablet 160 mg, 160 mg, Oral, Daily  [Held by provider] insulin lispro (HUMALOG) injection vial 30 Units, 30 Units, Subcutaneous, TID AC  LORazepam (ATIVAN) tablet 0.5 mg, 0.5 mg, Oral, BID PRN  pantoprazole (PROTONIX) tablet 40 mg, 40 mg, Oral, BID  rOPINIRole (REQUIP) tablet 1 mg, 1 mg, Oral, TID  zonisamide (ZONEGRAN) capsule 400 mg, 400 mg, Oral, Nightly  glucose (GLUTOSE) 40 % oral gel 15 g, 15 g, Oral, PRN  dextrose 50 % IV solution, 12.5 g, Intravenous, PRN  glucagon (rDNA) injection 1 mg, 1 mg, Intramuscular, PRN  dextrose 5 % solution, 100 mL/hr, Intravenous, PRN  labetalol (NORMODYNE;TRANDATE) injection syringe 10 mg, 10 mg, Intravenous, Q6H PRN  promethazine (PHENERGAN) tablet 12.5 mg, 12.5 mg, Oral, Q8H PRN  HYDROcodone-acetaminophen (NORCO) 7.5-325 MG per tablet 1 tablet, 1 tablet, Oral, Q6H PRN **OR** oxyCODONE-acetaminophen (PERCOCET) 5-325 MG per tablet 1 tablet, 1 tablet, Oral, Q6H PRN **OR** oxyCODONE-acetaminophen (PERCOCET) 5-325 MG per tablet 2 tablet, 2 tablet, Oral, Q4H PRN  ondansetron (ZOFRAN) injection 4 mg, 4 mg, Intravenous, Q6H PRN    ASSESSMENT AND PLAN    S/p UPPP and left hemithyroidectomy 1/15/2021 with Dr Raquel Calderon to advance diet to soft mechanical; patient encouraged to stay with smaller bites of foods and avoiding hot foods (okay for warmed foods).   - Continue with current pain control  - Encourage PO fluids  - Encourage ambulation, out of bed    Electronically signed by Susanna George NICANOR Adams - CNP on 1/24/2021 at 1:41 PM

## 2021-01-24 NOTE — PROGRESS NOTES
Gastroenterology Progress Note:     Patient Name:  Radha Srivastava   MRN: [id number]  [id number]  YOB: 1970  Admit Date: 1/15/2021  8:06 AM  Primary Care Physician: Cindy Worthington MD   [id number]     Patient seen and examined. 24 hours events and chart reviewed. Subjective: Patient resting in bed. Arouses to name. Able to confirm , name, year and place. Responses are delayed. Disoriented to day of week. Denies Abd pain, N/V. States he is passing gas. No guest at bedside. Discussed case with RN who states he is more alert today. LFTs  Continue to trend down. Objective:  /77   Pulse 82   Temp 97.8 °F (36.6 °C) (Oral)   Resp 18   Ht 6' 2\" (1.88 m)   Wt 227 lb 9.6 oz (103.2 kg)   SpO2 96%   BMI 29.22 kg/m²     Physical Exam:    General:  Nourished in no distress  HEENT: Atraumatic, normocephalic. Moist oral mucous membranes. Neck: Supple without adenopathy, JVD, or masses. Recent surgical scar noted base of neck. Trachea midline. CV: Heart RRR, no murmurs, rubs, gallops. Resp: Even, easy without cough or accessory use. Lungs clear to ascultation bilaterally. Abd: Round, soft, nontender. No hepatosplenomegaly or mass present. Active bowel sounds heard. No distention noted. Ext:  Without cyanosis, clubbing, edema. Skin: Pink, warm, dry  Neuro:  Alert, responses delayed, oriented to person and place.     Rectal: deferred    Labs:   CBC:   Lab Results   Component Value Date    WBC 7.3 2021    HGB 17.0 2021    HCT 48.6 2021    MCV 88.5 2021     2021     BMP:   Lab Results   Component Value Date     2021    K 3.5 2021     2021    CO2 27 2021    PHOS 3.0 2019    BUN 24 2021    CREATININE 0.9 2021    CALCIUM 9.9 2021     PT/INR:   Lab Results   Component Value Date    INR 1.08 2021     LFTs:   Lab Results   Component Value Date    ALKPHOS 270 2021    ALT 84 2021 AST 21 01/24/2021    BILITOT 1.0 01/24/2021    BILIDIR 0.5 01/21/2021    LABALBU 3.9 01/24/2021    LABALBU 4.3 03/23/2012    AMYLASE 29 09/01/2020    LIPASE 20.3 01/20/2021     Significant Diagnostic Studies:   No new in last 24 hours    Current Meds:  Scheduled Meds:   gabapentin  600 mg Oral 4x Daily    senna  2 tablet Oral Once    polyethylene glycol  17 g Oral Once    levETIRAcetam  2,000 mg Oral BID    lacosamide  300 mg Oral BID    metoprolol tartrate  75 mg Oral BID    insulin glargine  35 Units Subcutaneous BID    insulin lispro  0-18 Units Subcutaneous TID WC    insulin lispro  0-9 Units Subcutaneous Nightly    neomycin-bacitracin-polymyxin   Topical 3 times per day    [Held by provider] atorvastatin  10 mg Oral Daily    vitamin B-12  2,500 mcg Oral Daily    [Held by provider] divalproex  500 mg Oral BID    DULoxetine  120 mg Oral Daily    empagliflozin  25 mg Oral Daily    [Held by provider] fenofibrate  160 mg Oral Daily    [Held by provider] insulin lispro  30 Units Subcutaneous TID AC    pantoprazole  40 mg Oral BID    rOPINIRole  1 mg Oral TID    zonisamide  400 mg Oral Nightly     Continuous Infusions:   dextrose         Assessment:     47 yo M admitted 01/15/21 for scheduled thyroid lobectomy due to follicular neoplasm of the thyroid. He is having a lot of throat pain & taking little in PO. GI consulted for elevated LFTs. Unable to get MRCP due to claustrophobia, even with Ativan. CT abdomen/pelvis unremarkable other than for splenomegaly. US Liver unremarkable. Noted to be lethargic, not following commands. Ammonia level normal. Neurology and Psychiatry following. Possible unwitnessed seizure activity 2/2 altered medications inpatient (abrupt withdrawal of higher dose Neurontin, low dose Depakote) per Neurology with post-ictal confusion. LFTs continue to trend down. 1. Follicular neoplasm of the thyroid s/p thyroid lobectomy 01/15/21  2. Elevated LFTs- improving.     3. S/p cholecystectomy in the past.   4. Seizure disorder- with possible partial seizure inpatient per neurology from abrupt withdrawal from medications. 5. DM  6. H/O HTN  7. GERD  8. Depression, bipolar disorder- Schizoaffective disorder. 9. Abdominal pain - resolved. Plan:    · SANDY and ASMA for possible autoimmune hepatitis  · Monitor hepatic labs daily  · Avoid hepatotoxic meds - LFTs trending down; review and restart patient's home meds as he is at higher risk of seizures, disorientation than he is of hepatotoxicity complications from his chronic regimen. · Pain control per primary  · Continue PO PPI BID, home dose  · Case discussed with RN  · Neurology, Psychiatry, Dietician following.    · Supportive care per primary team    MAREN signing off    Case reviewed and impression/plan reviewed in collaboration with Dr. Justin Cordon  Electronically signed by NICANOR Mccormick CNP on 1/24/2021 at 9:46 AM    [facility]

## 2021-01-25 VITALS
SYSTOLIC BLOOD PRESSURE: 109 MMHG | WEIGHT: 227.1 LBS | DIASTOLIC BLOOD PRESSURE: 75 MMHG | TEMPERATURE: 98.2 F | BODY MASS INDEX: 29.14 KG/M2 | RESPIRATION RATE: 16 BRPM | HEIGHT: 74 IN | HEART RATE: 62 BPM | OXYGEN SATURATION: 95 %

## 2021-01-25 LAB
ALBUMIN SERPL-MCNC: 3.7 G/DL (ref 3.5–5.1)
ALP BLD-CCNC: 207 U/L (ref 38–126)
ALT SERPL-CCNC: 57 U/L (ref 11–66)
ANION GAP SERPL CALCULATED.3IONS-SCNC: 14 MEQ/L (ref 8–16)
AST SERPL-CCNC: 18 U/L (ref 5–40)
BILIRUB SERPL-MCNC: 0.7 MG/DL (ref 0.3–1.2)
BUN BLDV-MCNC: 14 MG/DL (ref 7–22)
CALCIUM SERPL-MCNC: 9.4 MG/DL (ref 8.5–10.5)
CHLORIDE BLD-SCNC: 104 MEQ/L (ref 98–111)
CO2: 26 MEQ/L (ref 23–33)
CREAT SERPL-MCNC: 0.6 MG/DL (ref 0.4–1.2)
ERYTHROCYTE [DISTWIDTH] IN BLOOD BY AUTOMATED COUNT: 12.5 % (ref 11.5–14.5)
ERYTHROCYTE [DISTWIDTH] IN BLOOD BY AUTOMATED COUNT: 40 FL (ref 35–45)
GFR SERPL CREATININE-BSD FRML MDRD: > 90 ML/MIN/1.73M2
GLUCOSE BLD-MCNC: 140 MG/DL (ref 70–108)
GLUCOSE BLD-MCNC: 141 MG/DL (ref 70–108)
GLUCOSE BLD-MCNC: 159 MG/DL (ref 70–108)
GLUCOSE BLD-MCNC: 189 MG/DL (ref 70–108)
HCT VFR BLD CALC: 46.2 % (ref 42–52)
HEMOGLOBIN: 16.1 GM/DL (ref 14–18)
MAGNESIUM: 1.9 MG/DL (ref 1.6–2.4)
MCH RBC QN AUTO: 30.7 PG (ref 26–33)
MCHC RBC AUTO-ENTMCNC: 34.8 GM/DL (ref 32.2–35.5)
MCV RBC AUTO: 88.2 FL (ref 80–94)
PLATELET # BLD: 265 THOU/MM3 (ref 130–400)
PMV BLD AUTO: 10.1 FL (ref 9.4–12.4)
POTASSIUM REFLEX MAGNESIUM: 3 MEQ/L (ref 3.5–5.2)
POTASSIUM SERPL-SCNC: 4 MEQ/L (ref 3.5–5.2)
RBC # BLD: 5.24 MILL/MM3 (ref 4.7–6.1)
SODIUM BLD-SCNC: 144 MEQ/L (ref 135–145)
TISSUE TRANSGLUTAMINASE IGA: 0.6 U/ML
TOTAL PROTEIN: 6.6 G/DL (ref 6.1–8)
WBC # BLD: 6.5 THOU/MM3 (ref 4.8–10.8)

## 2021-01-25 PROCEDURE — 83516 IMMUNOASSAY NONANTIBODY: CPT

## 2021-01-25 PROCEDURE — 6370000000 HC RX 637 (ALT 250 FOR IP): Performed by: OTOLARYNGOLOGY

## 2021-01-25 PROCEDURE — 6370000000 HC RX 637 (ALT 250 FOR IP): Performed by: PHYSICIAN ASSISTANT

## 2021-01-25 PROCEDURE — 92523 SPEECH SOUND LANG COMPREHEN: CPT

## 2021-01-25 PROCEDURE — 94760 N-INVAS EAR/PLS OXIMETRY 1: CPT

## 2021-01-25 PROCEDURE — 6370000000 HC RX 637 (ALT 250 FOR IP): Performed by: PSYCHIATRY & NEUROLOGY

## 2021-01-25 PROCEDURE — 6360000002 HC RX W HCPCS: Performed by: STUDENT IN AN ORGANIZED HEALTH CARE EDUCATION/TRAINING PROGRAM

## 2021-01-25 PROCEDURE — 6370000000 HC RX 637 (ALT 250 FOR IP): Performed by: INTERNAL MEDICINE

## 2021-01-25 PROCEDURE — 85027 COMPLETE CBC AUTOMATED: CPT

## 2021-01-25 PROCEDURE — 83735 ASSAY OF MAGNESIUM: CPT

## 2021-01-25 PROCEDURE — 84132 ASSAY OF SERUM POTASSIUM: CPT

## 2021-01-25 PROCEDURE — 80053 COMPREHEN METABOLIC PANEL: CPT

## 2021-01-25 PROCEDURE — 82948 REAGENT STRIP/BLOOD GLUCOSE: CPT

## 2021-01-25 PROCEDURE — 36415 COLL VENOUS BLD VENIPUNCTURE: CPT

## 2021-01-25 PROCEDURE — 86038 ANTINUCLEAR ANTIBODIES: CPT

## 2021-01-25 PROCEDURE — 99239 HOSP IP/OBS DSCHRG MGMT >30: CPT | Performed by: INTERNAL MEDICINE

## 2021-01-25 RX ORDER — INSULIN LISPRO 100 [IU]/ML
10 INJECTION, SOLUTION INTRAVENOUS; SUBCUTANEOUS 3 TIMES DAILY
Qty: 9 ML | Refills: 0 | Status: ON HOLD | OUTPATIENT
Start: 2021-01-25 | End: 2021-08-05 | Stop reason: HOSPADM

## 2021-01-25 RX ADMIN — BACITRACIN ZINC NEOMYCIN SULFATE POLYMYXIN B SULFATE: 400; 3.5; 5 OINTMENT TOPICAL at 04:43

## 2021-01-25 RX ADMIN — DULOXETIN HYDROCHLORIDE 120 MG: 60 CAPSULE, DELAYED RELEASE ORAL at 09:07

## 2021-01-25 RX ADMIN — POTASSIUM CHLORIDE 10 MEQ: 7.46 INJECTION, SOLUTION INTRAVENOUS at 14:21

## 2021-01-25 RX ADMIN — POTASSIUM CHLORIDE 10 MEQ: 7.46 INJECTION, SOLUTION INTRAVENOUS at 07:24

## 2021-01-25 RX ADMIN — ROPINIROLE 1 MG: 1 TABLET, FILM COATED ORAL at 09:13

## 2021-01-25 RX ADMIN — Medication 2500 MCG: at 09:05

## 2021-01-25 RX ADMIN — POTASSIUM CHLORIDE 10 MEQ: 7.46 INJECTION, SOLUTION INTRAVENOUS at 10:02

## 2021-01-25 RX ADMIN — GABAPENTIN 600 MG: 300 CAPSULE ORAL at 18:39

## 2021-01-25 RX ADMIN — ROPINIROLE 1 MG: 1 TABLET, FILM COATED ORAL at 14:13

## 2021-01-25 RX ADMIN — LACOSAMIDE 300 MG: 200 TABLET ORAL at 09:11

## 2021-01-25 RX ADMIN — METOPROLOL TARTRATE 75 MG: 25 TABLET ORAL at 09:06

## 2021-01-25 RX ADMIN — POTASSIUM CHLORIDE 10 MEQ: 7.46 INJECTION, SOLUTION INTRAVENOUS at 09:20

## 2021-01-25 RX ADMIN — POTASSIUM CHLORIDE 10 MEQ: 7.46 INJECTION, SOLUTION INTRAVENOUS at 11:47

## 2021-01-25 RX ADMIN — BACITRACIN ZINC NEOMYCIN SULFATE POLYMYXIN B SULFATE: 400; 3.5; 5 OINTMENT TOPICAL at 14:12

## 2021-01-25 RX ADMIN — PANTOPRAZOLE SODIUM 40 MG: 40 TABLET, DELAYED RELEASE ORAL at 09:12

## 2021-01-25 RX ADMIN — LEVETIRACETAM 2000 MG: 500 TABLET ORAL at 09:11

## 2021-01-25 RX ADMIN — INSULIN GLARGINE 35 UNITS: 100 INJECTION, SOLUTION SUBCUTANEOUS at 08:55

## 2021-01-25 RX ADMIN — GABAPENTIN 600 MG: 300 CAPSULE ORAL at 12:25

## 2021-01-25 RX ADMIN — POTASSIUM CHLORIDE 10 MEQ: 7.46 INJECTION, SOLUTION INTRAVENOUS at 06:21

## 2021-01-25 RX ADMIN — GABAPENTIN 600 MG: 300 CAPSULE ORAL at 09:05

## 2021-01-25 ASSESSMENT — PAIN SCALES - GENERAL
PAINLEVEL_OUTOF10: 0
PAINLEVEL_OUTOF10: 0

## 2021-01-25 NOTE — CARE COORDINATION
DISASTER CHARTING    1/25/21, 8:16 AM EST    DISCHARGE ONGOING EVALUATION:     [id number] [facility] day: 9  Location: -24/024-A Reason for admit: Follicular neoplasm of thyroid [D49.7]  MISTI (obstructive sleep apnea) [G47.33]   Barriers to Discharge: on IV Keppra and Vimpat over the W/E, now oral meds,  neuro checks, psychiatry connulted, monitor swallowing and strict I/O, dietitian for ONS, accu checks as ordered, GI following, K+ 3.0 with IV replacement, alk phos 207,       PCP: Ankita Franklin MD  Readmission Risk Score: 12%  Patient Goals/Plan/Treatment Preferences: from home with spouse Jose Carlos Diaz; spoke with Andrew John and he plans home later today. No need for [facility] or in-home services. Plans to follow up with Dr Fay Kauffman at [address] [place] [address]. 1/25/21, 9:24 AM EST    Patient goals/plan/ treatment preferences discussed by  and . Patient goals/plan/ treatment preferences reviewed with patient/ family. Patient/ family verbalize understanding of discharge plan and are in agreement with goal/plan/treatment preferences. Understanding was demonstrated using the teach back method. AVS provided by RN at time of discharge, which includes all necessary medical information pertaining to the patients current course of illness, treatment, post-discharge goals of care, and treatment preferences.         IMM Letter  IMM Letter given to Patient/Family/Significant other/Guardian/POA/by[de-identified] Maria Alejandra Wakefield CM  IMM Letter date given[de-identified] 01/25/21  IMM Letter time given[de-identified] 4553

## 2021-01-25 NOTE — PROGRESS NOTES
[address] ICU STEPDOWN TELEMETRY 4K  Speech - Language - Cognitive Evaluation    SLP Individual Minutes  Time In: 7031  Time Out: 6804  Minutes: 32  Timed Code Treatment Minutes: 0 Minutes       Date: 2021  Patient Name: Rachel Bah      CSN: [id number]   : 1970  (48 y.o.)  Gender: male   Referring Physician:  Eryn Esparza DO  Diagnosis: Follicular neoplasm of thyroid   Secondary Diagnosis: Dysphagia, Cognitive deficits   Precautions: Aspiration, Fall Risk   History of Present Illness/Injury: Patient admitted to [facility] with above dx. See physician H&P for full report. BSE completed  recommending minced and moist diet with thin liquids. Per Katy Brown Grande to advance diet to soft mechanical; patient encouraged to stay with smaller bites of foods and avoiding hot foods (okay for warmed foods). \" Patient with hx of seizures; ongoing intermittent/inconsistent confusion noted; patient fully independent prior to hospitalization. ST to complete cognitive evaluation and determine goals and POC as clinically appropriate.    Past Medical History:   Diagnosis Date    Abnormal thyroid biopsy     Acid reflux     Anemia     resolved - previously seen by Dr. Debbi Ames (due to enlarged spleen)    Anesthesia     seizures with brain surgery due to blood sugar got really high    Bipolar disorder ([facility]     Blood clot in vein 2016    Richi Minium     Cancer [facility]) 2019    thyroid    Chest pain     previously seeing [place] cardiologist, now seeing Dr. Horace Chow (LAD bridging on cath 2016)    Chronic back pain     Chronic bronchitis ([facility]     Dr. Jordana Ryan Colon polyp 2016    Depression     seeing Bebo Avalos DVT (deep venous thrombosis) ([facility] [id number]    not on Coumadin due to unable to regulate - Dr. Kimberly Willoughby - no etiology found per patient    Esophageal abnormality     nodule     Fatty liver disease, nonalcoholic     [facility]    Furuncle     legs    History of Doppler ultrasound 05/29/2011    No hemodynamically significant carotid stenosis is identified. A thyroid nodule on each side. Dedicated ultrasound of thyroid gland is suggested to further evaluate if clinically indicated.  History of pulmonary embolism 2007    s/p GFF, related to knee surgery    Hx of blood clots     leg and lung    Hyperlipidemia     severely elevated triglycerides    Hypertension     diastolic    Intracranial arachnoid cyst 11/2012    Dr. Sam Bring drained, complicated with seizures, DKA    Irritable bowel syndrome     Liver disease     Toshia Chen - elevated LFT - positive smooth muscle antibody, steatosis per liver bx 3/2014 with Dr. Apoorva Pérez (multiple drug resistant organisms) resistance 2012    MRSA (methicillin resistant staph aureus) culture positive     h/o in foot and before brain surgery    Nephrolithiasis     noted on CT abdomen 9/2016, 6/2019    Neuropathy     Pancreatic insufficiency     Positive SANDY (antinuclear antibody)     Dr. Junito Oliva - first visit in 6/2013    Prolonged emergence from general anesthesia     Restless legs syndrome     Seizures ([address]     started with brain surgery    Sinus tachycardia     Holter 3/2013 - seeing Dr. Li Lovell [facility])     clips     Sleep apnea     positive sleep apnea per study 10/2020.  Systolic dysfunction     \"systolic bridge\"  EF 11% ECHO 9/2019    Type II or unspecified type diabetes mellitus without mention of complication, not stated as uncontrolled 2007       Pain: No pain reported. Subjective:  Patient seen at bedside; alert and cooperative.  Patient with flat affective; improved expression when discussing grandchildren within informal conversation     SOCIAL HISTORY:    Living Arrangements: Home with wife, one daughter and three grandchildren   Work History: Retired  Driving Status: Active   Finance Management: Assistance Required- patient's wife completes finances   Medication Management: Independent  ADL's: Independent. Hobbies: patient enjoys spending time with grandchildren; Júnior Box- 3 y/o  Vision Status: WFL  Hearing: WFL       ORAL MOTOR:  Facial / Labial WFL    Lingual WFL    Dentition Impaired Edentulous    Velum Not Tested    Vocal Quality WFL    Sensation WFL    Cough Not Tested      SPEECH / VOICE:  Speech and Voice appear to be grossly intact for basic and complex daily communication    LANGUAGE:  Receptive:  Receptive language skills appear to be grossly intact for basic and complex daily communication. Expressive:  Expressive language skills appear to be grossly intact for basic and complex daily communication. COGNITION:  [place] Cognitive Assessment (MOCA) version 7.2 completed. Pt scored 13/30. Normal is greater than or equal to 26/30. Orientation: 6/6  Immediate Recall: 2/5  Short-Term Recall: 0/5  Divergent Namin members/60 sec  Problem Solving: Call light-WFL  Reasonin/2  Sequencing: Impaired  Thought Organization: Impaired  Insight: Fair  Attention: x3 errors  Math Computation: 1/3  Executive Functionin/5    SWALLOWING:  Current Diet: Minced and moist diet with thin liquids  *ST reviewed rational of minced and moist diet recommendations with offerings of multiple types of advanced PO trials to determine readiness for diet advancement d/t patient of expression/dislike to minced and moist textures within hospital setting- patient politely refused stating \"nothing sounds good. \" ST also reviewed ENT recommendations encouraging soft foods, ST reviewed specific foods to avoid and potential foods permitted. Patient verbalized understanding; will continue to target dysphagia     RECOMMENDATIONS/ASSESSMENT:  DIAGNOSTIC IMPRESSIONS:  Patient presents with moderate cognitive deficits as evidence by the findings outlined above.  Patient demonstrated with impaired memory, thought organization, attention, reasoning, higher level problem solving and executive functioning. All skills necessary to be intact and functioning appropriately to safely return to independent living and driving. Expressive and receptive language present to be [facility]. No dysarthria or dysphonia. Patient would benefit from ongoing skilled ST services with focus on home safety and higher level/complex attention and executive functioning tasks to assist with safe return to home setting, caring for small children and eventual return to driving. ST specifically recommending consideration of no return to driving until cleared by medical physician. ST with phone call midway through evaluation with patient's wife Cortes Whaley on patient's personal cell phone. ST reviewed consideration of restriction to driving until cleared by medical physician; patient's wife verbalized understanding. Rehabilitation Potential: good    EDUCATION:  Learner: Patient  Education:  Reviewed results and recommendations of this evaluation, Reviewed diet and strategies, Reviewed signs, symptoms and risks of aspiration, Reviewed ST goals and Plan of Care, Reviewed recommendations for follow-up and Education Related to Potential Risks and Complications Due to Impairment/Illness/Injury  Evaluation of Education: Verbalizes understanding, Demonstrates with assistance and Patient's wife verbalized understanding to ST recommendations    PLAN:  Skilled SLP intervention on acute care 3-5 x per week or until goals met and/or pt plateaus in function. Specific interventions for next session may include: dysphagia, cognitive deficits . PATIENT GOAL:    Did not state. Will further assess during treatment.     SHORT TERM GOALS:  Short-term Goals  Timeframe for Short-term Goals: 2 weeks  Goal 1: Patient will tolerate minced & moist solids with thin liquids with no overt s/s of aspiration/penetration to demosntrate safe meal consumption  Goal 2: Patient will complete advanced PO trials of soft solids (no hot food items, can be warmed-small bites) with tmiely oral phase of the swallow and no overt s/s of aspiration/penetration to demonstrate readiness for a diet upgrade  Goal 3: Patient will complete recall, short term memory and working memory tasks with 70% accuracy provided mod cues to improve overall recall of daily and medical information  Goal 4: Patient will complete problem solving/reasoning and thought organization tasks with 70% accuracy provided mod cues to improve overall safety within hospital and home setting, as well as caring for small children  Goal 5: Patient will complete attention tasks with no more than x2 errors within 2 mintues in order to safely return to completion of ADL, IADL tasks and driving      LONG TERM GOALS:  No LTGs due to [address]

## 2021-01-26 LAB — ANA SCREEN: NORMAL

## 2021-01-26 NOTE — PROGRESS NOTES
Patient discharged at this time. No questions. Discharge education provided to patient with teach back method. Patient discharge with wife at this time.

## 2021-01-26 NOTE — DISCHARGE SUMMARY
Hospital Medicine Discharge Summary      Patient Identification:   Name: Kamar Gutierrez   : 1970  MRN: [id number]   Account: [de-identified]      Patient's PCP: Chapis Whyte MD    Admit Date: 1/15/2021     Discharge Date: 2021    Admitting Physician: Sydni Singleton MD     Discharge Physician: Gamaliel Hurley DO         HPI:  Kamar Gutierrez is a 48 y.o. male who presented to [address] [place] [address] for resection of follicular neoplasm of the thyroid. Please see chart for more details regarding HPI. Hospital Course:   Kamar Gutierrez is a 48 y.o. male lifetime non-smoker, with a medical history of hyperlipidemia, hypertension, restless leg syndrome, chronic back pain, irritable bowel syndrome, seizure disorder, neuropathy, type 2 diabetes mellitus, anemia, chronic bronchitis, acid reflux, liver disease, depression, bipolar disorder, sleep apnea, pancreatic insufficiency, systolic dysfunction, and obesity who was admitted to [address] [place] [address] on 1/15/2021 for thyroid lobectomy. Patient has a history of a follicular neoplasm of the thyroid and Dr. Flower Christian performed the thyroid lobectomy on 1/15/2021, the patient tolerated the procedure well. Postoperatively, the patient remained hemodynamically stable and was transitioned to the stepdown unit. The hospitalist service was consulted for medical management    Follicular neoplasm of the thyroid - lobectomy was performed on 1/15/2021 by Dr. Flower Christian. The patient tolerated the surgery well with out complications. The patient had decreased oral intake over the following 48 hours. On 2021 the patient's care was transferred to the management of the hospital group. TSH, free T4, ionized calcium, and calcium were all within normal limits upon recheck. The patient had no complications and was discharged in stable condition.     Acute transaminitis -patient experienced acute transaminitis demonstrated on laboratory findings on Depakote 500 mg twice daily. Patient was discharged home in stable condition and recommended to follow-up withDr. Josh Del Cid at [facility] services for further medication management. The patient was stable for discharge - all consultants were contacted and in agreement with plan for discharge. Appropriate follow up appointment was arranged prior to discharge. Please see below or view chart for more details from hospital course. Discharge Diagnoses:    Thyroid lobectomy  Seizure disorder  Bipolar type I disorder  Acute transaminitis (resolved)      The patient was seen and examined on day of discharge and this discharge summary is in conjunction with any daily progress note from day of discharge. Exam:     Vitals:   Vitals:    01/25/21 0830 01/25/21 1021 01/25/21 1136 01/25/21 1522   BP: 108/74  110/75 109/75   Pulse: 71  67 62   Resp: 14  16 16   Temp: 98.1 °F (36.7 °C)  98.1 °F (36.7 °C) 98.2 °F (36.8 °C)   TempSrc: Oral  Oral Oral   SpO2: 94% 94% 93% 95%   Weight:       Height:         Weight: Weight: 227 lb 1.6 oz (103 kg)     24 hour intake/output:    Intake/Output Summary (Last 24 hours) at 1/25/2021 2247  Last data filed at 1/25/2021 1326  Gross per 24 hour   Intake 1289.31 ml   Output 850 ml   Net 439.31 ml         Physical Exam  Vitals signs and nursing note reviewed. Constitutional:       Appearance: Normal appearance. He is obese. HENT:      Head: Normocephalic and atraumatic. Right Ear: External ear normal.      Left Ear: External ear normal.      Nose: Nose normal.      Mouth/Throat:      Mouth: Mucous membranes are moist.      Pharynx: Oropharynx is clear. Eyes:      Conjunctiva/sclera: Conjunctivae normal.      Pupils: Pupils are equal, round, and reactive to light. Neck:      Musculoskeletal: Normal range of motion and neck supple. Cardiovascular:      Rate and Rhythm: Normal rate and regular rhythm. Pulses: Normal pulses.       Heart sounds: Normal heart sounds. Pulmonary:      Effort: Pulmonary effort is normal.      Breath sounds: Normal breath sounds. Abdominal:      General: Bowel sounds are normal.      Palpations: Abdomen is soft. Musculoskeletal: Normal range of motion. Skin:     General: Skin is warm and dry. Capillary Refill: Capillary refill takes less than 2 seconds. Neurological:      General: No focal deficit present. Mental Status: He is alert and oriented to person, place, and time. Mental status is at baseline. Psychiatric:         Attention and Perception: Attention and perception normal.         Mood and Affect: Mood normal. Affect is flat. Speech: Speech is delayed. Behavior: Behavior normal. Behavior is cooperative. Thought Content: Thought content normal.         Cognition and Memory: Cognition and memory normal.         Judgment: Judgment normal.         Labs: For convenience and continuity at follow-up the following most recent labs are provided:      CBC:    Lab Results   Component Value Date    WBC 6.5 01/25/2021    HGB 16.1 01/25/2021    HCT 46.2 01/25/2021     01/25/2021       Renal:    Lab Results   Component Value Date     01/25/2021    K 4.0 01/25/2021    K 3.0 01/25/2021     01/25/2021    CO2 26 01/25/2021    BUN 14 01/25/2021    CREATININE 0.6 01/25/2021    CALCIUM 9.4 01/25/2021    PHOS 3.0 06/22/2019         Significant Diagnostic Studies    Radiology:   CT ABDOMEN PELVIS W IV CONTRAST Additional Contrast? None   Final Result      1. Splenomegaly. 2. Cardiomegaly and bibasilar atelectasis. **This report has been created using voice recognition software. It may contain minor errors which are inherent in voice recognition technology. **      Final report electronically signed by Dr Maki Klein on 1/22/2021 11:58 AM      CT HEAD WO CONTRAST   Final Result    No evidence of acute intracranial abnormality.                **This report has been created using voice recognition software. It may contain minor errors which are inherent in voice recognition technology. **      Final report electronically signed by Dr. Jenny Dugan MD on 1/22/2021 10:43 AM      US LIVER   Final Result   Somewhat limited evaluation does not demonstrate any gross abnormality of the liver            **This report has been created using voice recognition software. It may contain minor errors which are inherent in voice recognition technology. **      Final report electronically signed by Dr. Paula Simental on 1/20/2021 3:58 PM             Consults:     IP CONSULT TO HOSPITALIST  IP CONSULT TO GI  IP CONSULT TO DIETITIAN  IP CONSULT TO PSYCHIATRY  IP CONSULT TO NEUROLOGY    Disposition:    [x] Home       [] TCU       [] Rehab       [] Psych       [] SNF       [] [place]       [] Other-    Condition at Discharge: stable    Code Status:  FULL    Patient Instructions:    Discharge lab work: None  Activity: activity as tolerated  Diet: No diet orders on file      Follow-up visits:   Saba Jones MD  [facility], [address] [place] [address],[address]  [address] [place], [place] [phone]    Go on 3/2/2021  Appointment time is: 11:30 am, Please arrive 15 minutes early, Please bring photo ID, insurance card, medications    Bozena Perez MD  [address] [facility] [place]    Go on 1/29/2021  Appointment time: 11:45am    , Please arrive 15 minutes early, Please bring photo ID, insurance card, medications    Rachel Hsu MD  [address] [address]    Go on 1/27/2021  For wound re-check.  Appointment time is 3:45pm    Laurena Cranker, APRN - CNP  [address]  [facility] [place] [address]  [phone]    Go on 2/22/2021  appt time 1:15 pm   bring photo ID, wear mask, insurance cards, medicine list    [facility]  [address] [facility] [address] [phone]  Schedule an appointment as soon as possible for a visit  follow with  [place] at [facility] services         Discharge Medications:        Medication List        START taking these medications      ketorolac 10 MG tablet  Commonly known as: TORADOL  Take 1 tablet by mouth every 6 hours as needed for Pain Alternate with Percocet     lidocaine viscous hcl 2 % Soln solution  Commonly known as: XYLOCAINE  Take 10 mLs by mouth every 3 hours as needed for Irritation or Pain Gargle in the back of the throat and then spit it out. Do this before eating or taking medications to make it more comfortable to swallow.             CHANGE how you take these medications      insulin lispro (1 Unit Dial) 100 UNIT/ML Sopn  Commonly known as: HumaLOG KwikPen  Inject 10 Units into the skin 3 times daily Plus sliding scale 2  What changed: how much to take            CONTINUE taking these medications      divalproex 500 MG DR tablet  Commonly known as: DEPAKOTE     DULoxetine 60 MG extended release capsule  Commonly known as: CYMBALTA     empagliflozin 25 MG tablet  Commonly known as: Jardiance  Take 25 mg by mouth daily     gabapentin 600 MG tablet  Commonly known as: NEURONTIN     Lantus SoloStar 100 UNIT/ML injection pen  Generic drug: insulin glargine     levETIRAcetam 500 MG tablet  Commonly known as: KEPPRA     LORazepam 0.5 MG tablet  Commonly known as: ATIVAN     Melatonin 10 MG Tabs     metoprolol tartrate 50 MG tablet  Commonly known as: LOPRESSOR  Take 1.5 tablets by mouth 2 times daily     pantoprazole 40 MG tablet  Commonly known as: Protonix  Take 1 tablet by mouth 2 times daily     promethazine 12.5 MG tablet  Commonly known as: PHENERGAN  Take 1-2 tablets by mouth every 8 hours as needed for Nausea     rOPINIRole 1 MG tablet  Commonly known as: REQUIP  TAKE 1 TABLET BY MOUTH THREE TIMES A DAY     sildenafil 100 MG tablet  Commonly known as: Viagra  Take 1 tablet by mouth as needed for Erectile Dysfunction     Vimpat 100 MG Tabs tablet  Generic drug: lacosamide     Vitamin B-12 2500 MCG Subl  Place 1 tablet under the tongue daily     vitamin D 1.25 MG (77490 UT) Caps capsule  Commonly known as: ERGOCALCIFEROL  Take 1 capsule by mouth once a week     zonisamide 100 MG capsule  Commonly known as: [place]            STOP taking these medications      atorvastatin 10 MG tablet  Commonly known as: LIPITOR     fenofibrate 145 MG tablet  Commonly known as: Tricor     lisinopril 10 MG tablet  Commonly known as: PRINIVIL;ZESTRIL            ASK your doctor about these medications      oxyCODONE-acetaminophen 7.5-325 MG per tablet  Commonly known as: Percocet  Take 1 tablet by mouth every 6 hours as needed for Pain for up to 3 days. Reduce frequency of medication as pain becomes more tolerable. Ask about: Should I take this medication? Where to Get Your Medications        These medications were sent to [address] [place] [address] [id number] [facility] [address] [facility] [phone]  [address] [place] [address]      Phone: [phone]   insulin lispro (1 Unit Dial) 100 UNIT/ML Sopn  ketorolac 10 MG tablet  lidocaine viscous hcl 2 % Soln solution  oxyCODONE-acetaminophen 7.5-325 MG per tablet           Discharge Time:  Time spent on discharge is >35 minutes in the examination, evaluation, counseling, and review of medications and discharge plan. The hospital course was discussed with the patient and all questions and concerns were addressed at that time. The patient was in agreement with and verbalized understanding of the plan of care and had no additional questions or complaints. The patient was instructed to follow-up with any scheduled outpatient appointments or to report to the nearest Emergency Department if new or worsening symptoms should arise. Thank you Mireya Joe MD for the opportunity to be involved in this patient's care.     Signed:    Electronically signed by Liam Villalobos DO on 1/25/2021 at 10:47 PM

## 2021-01-27 LAB — F-ACTIN AB IGG: 7 UNITS (ref 0–19)

## 2021-02-01 ENCOUNTER — OFFICE VISIT (OUTPATIENT)
Dept: ENT CLINIC | Age: 51
End: 2021-02-01

## 2021-02-01 VITALS
TEMPERATURE: 98.2 F | HEART RATE: 66 BPM | HEIGHT: 74 IN | DIASTOLIC BLOOD PRESSURE: 68 MMHG | SYSTOLIC BLOOD PRESSURE: 114 MMHG | OXYGEN SATURATION: 100 % | BODY MASS INDEX: 30.16 KG/M2 | WEIGHT: 235 LBS | RESPIRATION RATE: 14 BRPM

## 2021-02-01 DIAGNOSIS — G40.909 SEIZURE DISORDER (HCC): ICD-10-CM

## 2021-02-01 DIAGNOSIS — E89.0 STATUS POST PARTIAL THYROIDECTOMY: Primary | ICD-10-CM

## 2021-02-01 DIAGNOSIS — Z98.890 STATUS POST UVULOPALATOPHARYNGOPLASTY: ICD-10-CM

## 2021-02-01 DIAGNOSIS — R41.0 CONFUSION: ICD-10-CM

## 2021-02-01 DIAGNOSIS — D49.7 FOLLICULAR NEOPLASM OF THYROID: ICD-10-CM

## 2021-02-01 PROCEDURE — 99024 POSTOP FOLLOW-UP VISIT: CPT | Performed by: OTOLARYNGOLOGY

## 2021-02-01 NOTE — PROGRESS NOTES
 Metformin Nausea Only     Abdominal pain, diarrhea    Niacin And Related Other (See Comments)     Burning sensation, severe flushing    Tramadol Hcl      Contraindication to seizure medications    Ultram [Tramadol]      Contraindication to seizure medications    Warfarin      Very difficult to regulate in past     Current Outpatient Medications   Medication Sig Dispense Refill    insulin lispro, 1 Unit Dial, (HUMALOG KWIKPEN) 100 UNIT/ML SOPN Inject 10 Units into the skin 3 times daily Plus sliding scale 2 9 mL 0    insulin glargine (LANTUS SOLOSTAR) 100 UNIT/ML injection pen Inject 75 Units into the skin 2 times daily      gabapentin (NEURONTIN) 600 MG tablet Take 600 mg by mouth 4 times daily.  rOPINIRole (REQUIP) 1 MG tablet TAKE 1 TABLET BY MOUTH THREE TIMES A  tablet 1    Cyanocobalamin (VITAMIN B-12) 2500 MCG SUBL Place 1 tablet under the tongue daily 90 tablet 1    vitamin D (ERGOCALCIFEROL) 1.25 MG (41056 UT) CAPS capsule Take 1 capsule by mouth once a week 12 capsule 1    metoprolol tartrate (LOPRESSOR) 50 MG tablet Take 1.5 tablets by mouth 2 times daily 270 tablet 1    empagliflozin (JARDIANCE) 25 MG tablet Take 25 mg by mouth daily 42 tablet 0    promethazine (PHENERGAN) 12.5 MG tablet Take 1-2 tablets by mouth every 8 hours as needed for Nausea 60 tablet 2    pantoprazole (PROTONIX) 40 MG tablet Take 1 tablet by mouth 2 times daily 180 tablet 1    Melatonin 10 MG TABS Take by mouth nightly 3 tab      DULoxetine (CYMBALTA) 60 MG extended release capsule Take by mouth daily 2 tab      lacosamide (VIMPAT) 100 MG TABS tablet Take by mouth 2 times daily.  3 tab      sildenafil (VIAGRA) 100 MG tablet Take 1 tablet by mouth as needed for Erectile Dysfunction 45 tablet 3    divalproex (DEPAKOTE) 500 MG DR tablet Take 500 mg by mouth 2 times daily       levETIRAcetam (KEPPRA) 500 MG tablet Take by mouth 2 times daily 4 tab  LORazepam (ATIVAN) 0.5 MG tablet Take 0.5 mg by mouth 2 times daily as needed (seizures)       zonisamide (ZONEGRAN) 100 MG capsule Take by mouth nightly 4 tab       No current facility-administered medications for this visit. Past Medical History:   Diagnosis Date    Abnormal thyroid biopsy     Acid reflux     Anemia     resolved - previously seen by Dr. Stephany Lainez (due to enlarged spleen)    Anesthesia     seizures with brain surgery due to blood sugar got really high    Bipolar disorder ([facility] [address]     Blood clot in vein 04/07/2016    [place]     Cancer [facility]) 04/2019    thyroid    Chest pain     previously seeing [facility] cardiologist, now seeing Dr. Zenaida Ervin (LAD bridging on cath 4/2016)    Chronic back pain     Chronic bronchitis ([facility] [address]     Dr. Luly Black Colon polyp 08/30/2016    Depression     seeing Wandalee Felty DVT (deep venous thrombosis) ([facility] [address] [id number]    not on Coumadin due to unable to regulate - Dr. Yani Calixto - no etiology found per patient    Esophageal abnormality     nodule     Fatty liver disease, nonalcoholic     U/S [id number] [facility]    Furunc     legs    History of Doppler ultrasound 05/29/2011    No hemodynamically significant carotid stenosis is identified. A thyroid nodule on each side. Dedicated ultrasound of thyroid gland is suggested to further evaluate if clinically indicated.      History of pulmonary embolism 2007    s/p GFF, related to knee surgery    Hx of blood clots     leg and lung    Hyperlipidemia     severely elevated triglycerides    Hypertension     diastolic    Intracranial arachnoid cyst 11/2012    Dr. Satish Gonzalez drained, complicated with seizures, DKA    Irritable bowel syndrome     Liver disease     Floreen Sell - elevated LFT - positive smooth muscle antibody, steatosis per liver bx 3/2014 with Dr. Sugar Rausch (multiple drug resistant organisms) resistance 2012    MRSA (methicillin resistant staph aureus) culture positive  TONSILLECTOMY      TRANSTHORACIC ECHOCARDIOGRAM  3-05-11    LV size and systolic function normal. EF 55-65%.  UPPER GASTROINTESTINAL ENDOSCOPY  2012    UPPER GASTROINTESTINAL ENDOSCOPY  2016    Dr. Sunitha Holloway Left 10/22/2019    EGD DILATION SAVORY performed by Sandor Guzman MD at [address] Left 10/22/2019    EGD BIOPSY performed by Sandor Guzman MD at [facility]   [facility]  2007     Family History   Problem Relation Age of Onset    Heart Disease Father 61        MI    Stroke Brother     Obesity Brother     Arthritis Mother     Seizures Mother     Obesity Sister     Other Brother         pulmonary embolism    Diabetes Daughter     Seizures Brother      Social History     Tobacco Use    Smoking status: Never Smoker    Smokeless tobacco: Never Used   Substance Use Topics    Alcohol use: No     Alcohol/week: 0.0 standard drinks        Subjective:      Review of Systems  Rest of review of systems are negative, except as noted in HPI.      Objective:     /68 (Site: Left Upper Arm, Position: Sitting)   Pulse 66   Temp 98.2 °F (36.8 °C) (Infrared)   Resp 14   Ht 6' 2\" (1.88 m)   Wt 235 lb (106.6 kg)   SpO2 100%   BMI 30.17 kg/m²     Physical Exam On general physical exam the patient is pleasant somewhat alert and well oriented adult male in no acute distress. He answers all my questions carefully and accurately. He did not need to correct himself very much and his language constructs were clear and grammatically correct. Is breathing without effort. He is neither hyper nor hyponasal.  I heard no throat clearing coughing or inspiratory stridor. His neck was abnormal for the presence of a well-healing thyroidectomy scar. His oropharynx was abnormal for the presence of fibrinous debris across the rim of his palate and tonsillar fossa. The fossa plication was intact bilaterally. No evidence of recent bleeding could be detected. Vitals reviewed. Ct Head Wo Contrast    Result Date: 1/22/2021   No evidence of acute intracranial abnormality. **This report has been created using voice recognition software. It may contain minor errors which are inherent in voice recognition technology. ** Final report electronically signed by Dr. Corina Dias MD on 1/22/2021 10:43 AM    Ct Abdomen Pelvis W Iv Contrast Additional Contrast? None    Result Date: 1/22/2021  1. Splenomegaly. 2. Cardiomegaly and bibasilar atelectasis. **This report has been created using voice recognition software. It may contain minor errors which are inherent in voice recognition technology. ** Final report electronically signed by Dr Brittanie House on 1/22/2021 11:58 AM    Us Liver    Result Date: 1/20/2021  Somewhat limited evaluation does not demonstrate any gross abnormality of the liver **This report has been created using voice recognition software. It may contain minor errors which are inherent in voice recognition technology. ** Final report electronically signed by Dr. Marilynn Shelley on 1/20/2021 3:58 PM     Lab Results   Component Value Date     01/25/2021     01/24/2021     01/23/2021    K 4.0 01/25/2021    K 3.0 01/25/2021    K 3.5 01/24/2021 K 3.7 01/23/2021     01/25/2021     01/24/2021     01/23/2021    CO2 26 01/25/2021    CO2 27 01/24/2021    CO2 20 01/23/2021    BUN 14 01/25/2021    BUN 24 01/24/2021    BUN 20 01/23/2021    CREATININE 0.6 01/25/2021    CREATININE 0.9 01/24/2021    CREATININE 0.6 01/23/2021    CALCIUM 9.4 01/25/2021    CALCIUM 9.9 01/24/2021    CALCIUM 9.6 01/23/2021    PROT 6.6 01/25/2021    PROT 7.0 01/24/2021    PROT 7.5 01/23/2021    LABALBU 3.7 01/25/2021    LABALBU 3.9 01/24/2021    LABALBU 4.2 01/23/2021    LABALBU 4.3 03/23/2012    LABALBU 3.7 01/06/2012    LABALBU 4.1 01/05/2012    BILITOT 0.7 01/25/2021    BILITOT 1.0 01/24/2021    BILITOT 1.1 01/23/2021    ALKPHOS 207 01/25/2021    ALKPHOS 270 01/24/2021    ALKPHOS 303 01/23/2021    AST 18 01/25/2021    AST 21 01/24/2021    AST 29 01/23/2021    ALT 57 01/25/2021    ALT 84 01/24/2021     01/23/2021       All of the past medical history, past surgical history, family history,social history, allergies and current medications were reviewed with the patient. Assessment & Plan   Diagnoses and all orders for this visit:     Diagnosis Orders   1. Status post partial thyroidectomy      Left hemithyroidectomy   2. Status post uvulopalatopharyngoplasty     3. 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 Seizure disorder ([address]     5.  Confusion Based on his history and these physical findings, the patient is clinically recovering well after extensive surgery complicated by hepatitis and changes in his seizure threshold with numerous seizures above his normal baseline occurrence. That is a neurological impact of high magnitude and very reasonable explanation for his slow cognitive recovery since he had no real evidence to suggest that he had developed worsened neurological disease or it had some form of cerebrovascular accident. Based on that, I recommended to him that he not overly attend to the slowness of his cognition but rather to the new successes he is having on a daily basis of doing things that in the recent past have been very challenging or not possible. That way he can concentrate on the recovery that he is attaining rather than the deficits he still is experiencing. We have every reason to believe that he will fully recover still and no reason to believe that he has suffered permanent changes to his mentation. At the very least, he can now breathe at night in a manner that he has not been able to breathe for several decades, something that will doubtlessly raise his seizure threshold eventually and may enable him to come down on some of his antiseizure medications before all is said and done. I encouraged him and his wife is much as I could to adopt this healthier view of his current limitations by focusing on his surpassing one or a few these limitations per day to enjoy. He reported understanding the basis of my recommendations and being willing to proceed as such. I will see him back in a couple of months to make sure that he is thriving. My expectation is that it is his fibrinous debris mucosalizes, he will develop further rigidity in the soft palate and pillars improving his airway dynamics further. He reported understanding the basis of my recommendations and predictions and be willing to proceed as such. Return in about 2 months (around 4/1/2021) for Follow-up evaluation and care as indicated. **This report has been created using voice recognition software. It may contain minor errors which are inherent in voice recognition technology. **

## 2021-02-02 ENCOUNTER — OFFICE VISIT (OUTPATIENT)
Dept: INTERNAL MEDICINE CLINIC | Age: 51
End: 2021-02-02
Payer: MEDICARE

## 2021-02-02 VITALS
DIASTOLIC BLOOD PRESSURE: 70 MMHG | HEART RATE: 84 BPM | SYSTOLIC BLOOD PRESSURE: 100 MMHG | BODY MASS INDEX: 31.13 KG/M2 | HEIGHT: 74 IN | WEIGHT: 242.6 LBS | TEMPERATURE: 97.2 F

## 2021-02-02 DIAGNOSIS — I10 ESSENTIAL HYPERTENSION: ICD-10-CM

## 2021-02-02 DIAGNOSIS — G93.40 ENCEPHALOPATHY: Primary | ICD-10-CM

## 2021-02-02 DIAGNOSIS — R56.9 SEIZURES (HCC): ICD-10-CM

## 2021-02-02 DIAGNOSIS — E11.42 TYPE 2 DIABETES MELLITUS WITH DIABETIC POLYNEUROPATHY, WITH LONG-TERM CURRENT USE OF INSULIN (HCC): ICD-10-CM

## 2021-02-02 DIAGNOSIS — Z79.4 TYPE 2 DIABETES MELLITUS WITH DIABETIC POLYNEUROPATHY, WITH LONG-TERM CURRENT USE OF INSULIN (HCC): ICD-10-CM

## 2021-02-02 DIAGNOSIS — Z92.89 HOSPITALIZATION WITHIN LAST 30 DAYS: ICD-10-CM

## 2021-02-02 PROCEDURE — G8484 FLU IMMUNIZE NO ADMIN: HCPCS | Performed by: STUDENT IN AN ORGANIZED HEALTH CARE EDUCATION/TRAINING PROGRAM

## 2021-02-02 PROCEDURE — G8417 CALC BMI ABV UP PARAM F/U: HCPCS | Performed by: STUDENT IN AN ORGANIZED HEALTH CARE EDUCATION/TRAINING PROGRAM

## 2021-02-02 PROCEDURE — 1036F TOBACCO NON-USER: CPT | Performed by: STUDENT IN AN ORGANIZED HEALTH CARE EDUCATION/TRAINING PROGRAM

## 2021-02-02 PROCEDURE — G8427 DOCREV CUR MEDS BY ELIG CLIN: HCPCS | Performed by: STUDENT IN AN ORGANIZED HEALTH CARE EDUCATION/TRAINING PROGRAM

## 2021-02-02 PROCEDURE — 3017F COLORECTAL CA SCREEN DOC REV: CPT | Performed by: STUDENT IN AN ORGANIZED HEALTH CARE EDUCATION/TRAINING PROGRAM

## 2021-02-02 PROCEDURE — 2022F DILAT RTA XM EVC RTNOPTHY: CPT | Performed by: STUDENT IN AN ORGANIZED HEALTH CARE EDUCATION/TRAINING PROGRAM

## 2021-02-02 PROCEDURE — 3044F HG A1C LEVEL LT 7.0%: CPT | Performed by: STUDENT IN AN ORGANIZED HEALTH CARE EDUCATION/TRAINING PROGRAM

## 2021-02-02 PROCEDURE — 99214 OFFICE O/P EST MOD 30 MIN: CPT | Performed by: STUDENT IN AN ORGANIZED HEALTH CARE EDUCATION/TRAINING PROGRAM

## 2021-02-02 RX ORDER — LORAZEPAM 0.5 MG/1
0.5 TABLET ORAL 2 TIMES DAILY PRN
Qty: 10 TABLET | Refills: 0 | Status: SHIPPED | OUTPATIENT
Start: 2021-02-02 | End: 2021-03-03 | Stop reason: SDUPTHER

## 2021-02-02 NOTE — PROGRESS NOTES
1970       Keeley Woodall (:  1970) is a 48 y.o. male,Established patient, here for evaluation of the following chief complaint(s):  Follow-Up from Hospital ([facility] thyroid surgery ) and Other (c/o dizziness, some confussion , speech difficulty  , balance issues , headaches)      ASSESSMENT/PLAN:  1. Encephalopathy  -     Comprehensive Metabolic Panel; Future  -     Lacosamide Level; Future  -     Levetiracetam Level; Future  -     Valproic Acid Level, Total; Future  -     Ammonia; Future  -     CBC; Future  -     MRI BRAIN WO CONTRAST; Future  -     Urine Rt Reflex To Culture; Future  2. Hospitalization within last 30 days  3. Type 2 diabetes mellitus with diabetic polyneuropathy, with long-term current use of insulin ([facility] [address]  4. Essential hypertension  5. Seizures (HCC)  -     Lacosamide Level; Future  -     Levetiracetam Level; Future  -     Valproic Acid Level, Total; Future  -     EEG; Future  -     LORazepam (ATIVAN) 0.5 MG tablet; Take 1 tablet by mouth 2 times daily as needed (seizures) for up to 10 doses. , Disp-10 tablet, R-0Print    1. Ecephalopathy, unspecified. Cause unclear. 3-5 strength in left hip and normal strength the rest body. Unsteady gait. New onset headache, blurry vision, word finding difficulty, and confusion. Consider severe post-ictal state versus reaction to multiple antiepileptic medication versus subacute encephalitis versus sequale of multiple seizures. Previous CT brain on 2021 showed no acute intracranial pathology. CMP. Lacosamide level. Levetiracetam level. Valproic Acid level. Ammonia. MRI Brain with and without contrast.   Follow-up with Neurology in March and sooner if possible. Follow-up in two weeks. 2. Hospitalization within Past 30 Days. Primary issue as above. Followed-up with Ear, Nose, and Throat Doctor. Left hemithyroidectomy and uvulopalatopharyngoplasty successful.      3.  Type 2 diabetes with diabetic polyneuropathy, with long-term use of insulin. Current good control with glucoses between 110-150. Daughter reported highest glucose 210. Better than previous values of 300-500.    4.  Benign essential hypertension. Blood pressure low in this visit. Counseled to take home blood pressure. Follow-up in 2 weeks. 5.  Seizures, unspecified. Maintain on as needed Ativan. Maintain on Vimpat, Depakote, Zonegran and Keppra. Otherwise as above. Return in about 2 weeks (around 2/16/2021). Chief complaint/Opening statement:    Mr. Hannah Beckett is a 48year old male with a complex past medical history including hyperlipidemia, hypertension, restless legs, chronic back pain, seizure disorder, type 2 diabetes, dvt, mrsa, furuncle, nonalcoholic fatty liver disease, nephrolithiasis, chf, and pancreatic insufficiency who we are seeing for altered mental status and follow-up status post hospitalization for left hemithyroidectomy and uvulopalatopharyngoplasty. SUBJECTIVE/OBJECTIVE:    Note: History given by patient and daughter. Had a seizure during his hospitalization approximately 3 to 4 days before discharge which represents origin of current symptoms. At the time, had confusion, \"a lot of pain,\" thought he was in [facility], and could not talk. Has a history of seizures with postictal episodes lasted for several days but none of them were severe or lasted as long. Primary symptoms dizziness, confusion, word finding difficulty, constant 7 out of 10 headache on the top of his head, and blurry vision. Stated he \"cannot even see the TV. \"  No change in hearing. The symptoms improved slightly after the seizure in the hospital but have remained unchanged since discharge on 1/25/2021. Earliest neurology appointment is in March. Daughter states \"neurology is not been helpful. \"    Multiple falls secondary to dizziness. Minimal postoperative pain.     Was prescribed Toradol but is not taking as he understands this would interfere with seizure medications. Patient reports finger stick blood sugars of 110-150. No episodes of hypoglycemia. Most recent eye exam: Patient reports over spring. Report not immediately available, unknown if DM retinopathy. Renal disease -creatinine 0.6 on 1/25/2021. Microalbumin/creatinine ratio: 59 on 12/29/2020. Denies new foot lesions, pre-existing peripheral neuropathy. LDL -total cholesterol 147, HDL 26, and triglycerides 160 on 12/29/2020. LDL unobtainable due to hydroglyceride. Autonomic dysfunction -as above. Patient check FSBS dailyyes. HgA1c: Not immediately available. Check on next visit in 2 weeks. Medications:    75 units Lantus morning and night plus sliding scale for lunch and dinner. 10 units plus sliding scale three times a day. Jardiance 25 mg daily. Allergies   Allergen Reactions    Ciprofloxacin-Ciproflox Hcl Er Other (See Comments)     Elevated creatinine    Heparin      Monitor for thrombocytopenia    Metformin Nausea Only     Abdominal pain, diarrhea    Niacin And Related Other (See Comments)     Burning sensation, severe flushing    Tramadol Hcl      Contraindication to seizure medications    Ultram [Tramadol]      Contraindication to seizure medications    Warfarin      Very difficult to regulate in past     Current Outpatient Medications   Medication Sig Dispense Refill    LORazepam (ATIVAN) 0.5 MG tablet Take 1 tablet by mouth 2 times daily as needed (seizures) for up to 10 doses. 10 tablet 0    insulin lispro, 1 Unit Dial, (HUMALOG KWIKPEN) 100 UNIT/ML SOPN Inject 10 Units into the skin 3 times daily Plus sliding scale 2 9 mL 0    insulin glargine (LANTUS SOLOSTAR) 100 UNIT/ML injection pen Inject 75 Units into the skin 2 times daily      gabapentin (NEURONTIN) 600 MG tablet Take 600 mg by mouth 4 times daily.       rOPINIRole (REQUIP) 1 MG tablet TAKE 1 TABLET BY MOUTH THREE TIMES A  tablet 1    Cyanocobalamin (VITAMIN B-12) 2500 MCG SUBL Place 1 tablet under the tongue daily 90 tablet 1    vitamin D (ERGOCALCIFEROL) 1.25 MG (85106 UT) CAPS capsule Take 1 capsule by mouth once a week 12 capsule 1    metoprolol tartrate (LOPRESSOR) 50 MG tablet Take 1.5 tablets by mouth 2 times daily 270 tablet 1    empagliflozin (JARDIANCE) 25 MG tablet Take 25 mg by mouth daily 42 tablet 0    promethazine (PHENERGAN) 12.5 MG tablet Take 1-2 tablets by mouth every 8 hours as needed for Nausea 60 tablet 2    pantoprazole (PROTONIX) 40 MG tablet Take 1 tablet by mouth 2 times daily 180 tablet 1    Melatonin 10 MG TABS Take by mouth nightly 3 tab      DULoxetine (CYMBALTA) 60 MG extended release capsule Take by mouth daily 2 tab      lacosamide (VIMPAT) 100 MG TABS tablet Take 100 mg by mouth 3 times daily. 3 tab      sildenafil (VIAGRA) 100 MG tablet Take 1 tablet by mouth as needed for Erectile Dysfunction 45 tablet 3    divalproex (DEPAKOTE) 500 MG DR tablet Take 500 mg by mouth 2 times daily       levETIRAcetam (KEPPRA) 500 MG tablet Take 1,000 mg by mouth 2 times daily 4 tab      zonisamide (ZONEGRAN) 100 MG capsule Take by mouth nightly 4 tab       No current facility-administered medications for this visit.       Past Medical History:   Diagnosis Date    Abnormal thyroid biopsy     Acid reflux     Anemia     resolved - previously seen by Dr. Arvind Hanley (due to enlarged spleen)    Anesthesia     seizures with brain surgery due to blood sugar got really high    Bipolar disorder ([facility] [address]     Blood clot in vein 04/07/2016    [facility]     Cancer [facility]) 04/2019    thyroid    Chest pain     previously seeing [facility] cardiologist, now seeing Dr. Tiffani Salinas (LAD bridging on cath 4/2016)    Chronic back pain     Chronic bronchitis ([facility] [address]     Dr. Grijalva Black Colon polyp 08/30/2016    Depression     seeing Alexandra Smith DVT (deep venous thrombosis) ([facility] [address] [id number]    not on Coumadin due to unable to regulate - Dr. Mariana Kearns - no etiology found per patient    Esophageal abnormality     nodule     Fatty liver disease, nonalcoholic     U/S 86/9426 [facility]    Furuncle     legs    History of Doppler ultrasound 05/29/2011    No hemodynamically significant carotid stenosis is identified. A thyroid nodule on each side. Dedicated ultrasound of thyroid gland is suggested to further evaluate if clinically indicated.  History of pulmonary embolism 2007    s/p GFF, related to knee surgery    Hx of blood clots     leg and lung    Hyperlipidemia     severely elevated triglycerides    Hypertension     diastolic    Intracranial arachnoid cyst 11/2012    Dr. Basilio Figures drained, complicated with seizures, DKA    Irritable bowel syndrome     Liver disease     Alesia Daniel - elevated LFT - positive smooth muscle antibody, steatosis per liver bx 3/2014 with Dr. Geremias Cruz (multiple drug resistant organisms) resistance 2012    MRSA (methicillin resistant staph aureus) culture positive     h/o in foot and before brain surgery    Nephrolithiasis     noted on CT abdomen 9/2016, 6/2019    Neuropathy     Pancreatic insufficiency     Positive SANDY (antinuclear antibody)     Dr. Cally Duque - first visit in 6/2013    Prolonged emergence from general anesthesia     Restless legs syndrome     Seizures ([address]     started with brain surgery    Sinus tachycardia     Holter 3/2013 - seeing Dr. Mal Ingram fracture [facility])     clips     Sleep apnea     positive sleep apnea per study 10/2020.     Systolic dysfunction     \"systolic bridge\"  EF 95% ECHO 9/2019    Type II or unspecified type diabetes mellitus without mention of complication, not stated as uncontrolled 2007     Past Surgical History:   Procedure Laterality Date    CARDIAC CATHETERIZATION      2011,5-6 years ago   [address]  3-2012   [address]  04/28/2016    [facility]     CARPAL TUNNEL RELEASE  01/2017    CHOLECYSTECTOMY  2004    COLONOSCOPY  2012    COLONOSCOPY 08/2016    2 tubular adenomas - reportedly needs repeat scope in 6 months    CRANIOTOMY  11/2012    arachnoid cyst drainage    EKG 12-LEAD  10/18/2015         ENDOSCOPY, COLON, DIAGNOSTIC      HERNIA REPAIR Right 1996    [facility]--Dr. Elizabeth Becker INGUINAL HERNIA REPAIR Right 09/15/2016    Robotic assisted    KNEE ARTHROSCOPY Right 2016    KNEE SURGERY Left 2007    acl and debrided twice    OTHER SURGICAL HISTORY  5-31-11    Tilt table was associated w/ nonspecific symptoms or nausea, otherwise no significant dizziness or syncope. No significant hemodynamic changes. Otherwise, unremarkable tilt table test after 30 minutes of tilting.  OTHER SURGICAL HISTORY  01/20/2017    RIGHT SHOULDER ARTHROSCOPY, OPEN STAN, OPEN ACROMIOPLASTY, BICEP TENDESIS, RIGHT CARPAL TUNNEL RELEASE    SHOULDER SURGERY Right 01/2007    THYROID LOBECTOMY N/A 1/15/2021    THYROID LOBECTOMY, UVULOPALATOPHARYNGOPLAST LAOP performed by Ayleen Hartmann MD at [address] ECHOCARDIOGRAM  3-05-11    LV size and systolic function normal. EF 55-65%.      UPPER GASTROINTESTINAL ENDOSCOPY  2012    UPPER GASTROINTESTINAL ENDOSCOPY  2016    Dr. Beatriz Roe Left 10/22/2019    EGD DILATION SAVORY performed by Leticia Kumari MD at [facility] Left 10/22/2019    EGD BIOPSY performed by Leticia Kumari MD at [address] [facility]   [facility]  2007     Family History   Problem Relation Age of Onset    Heart Disease Father 61        MI    Stroke Brother     Obesity Brother     Arthritis Mother     Seizures Mother     Obesity Sister     Other Brother         pulmonary embolism    Diabetes Daughter     Seizures Brother      Social History     Socioeconomic History    Marital status:      Spouse name: Not on file    Number of children: 3    Years of education: Not on file    Highest education level: Not on file Occupational History    Occupation: Disability since 2011   Social Needs    Financial resource strain: Not on file    Food insecurity     Worry: Not on file     Inability: Not on file   [employer] needs     Medical: Not on file     Non-medical: Not on file   Tobacco Use    Smoking status: Never Smoker    Smokeless tobacco: Never Used   Substance and Sexual Activity    Alcohol use: No     Alcohol/week: 0.0 standard drinks    Drug use: No    Sexual activity: Not on file   Lifestyle    Physical activity     Days per week: Not on file     Minutes per session: Not on file    Stress: Not on file   Relationships    Social connections     Talks on phone: Not on file     Gets together: Not on file     Attends Voodoo service: Not on file     Active member of club or organization: Not on file     Attends meetings of clubs or organizations: Not on file     Relationship status: Not on file    Intimate partner violence     Fear of current or ex partner: Not on file     Emotionally abused: Not on file     Physically abused: Not on file     Forced sexual activity: Not on file   Other Topics Concern    Not on file   Social History Narrative    Not on file     Review of Systems - General ROS: negative for - chills or fever  Psychological ROS: negative for - anxiety and depression  Hematological and Lymphatic ROS: Yes lung and leg clots.   Respiratory ROS: no cough, shortness of breath, or wheezing  Cardiovascular ROS: no chest pain or dyspnea on exertion, tolerates a flight of stairs, vacuuming  Gastrointestinal ROS: abdominal pain baseline; change in bowel habits, or black or bloody stools  Genito-Urinary ROS: no dysuria, trouble voiding, or hematuria  Musculoskeletal ROS: negative for - muscle pain or muscular weakness,   Neurological ROS: negative for - yes headaches, peripheral neuropathy, seizures  Dermatological ROS: negative for - rash or skin lesion changes    Blood pressure 100/70, pulse 84, temperature 97.2 °F (36.2 °C), height 6' 2\" (1.88 m), weight 242 lb 9.6 oz (110 kg). Physical Examination: Awake, alert, and in no acute distress. Slow speech and some confusion, which he and his family states is not his normal baseline. Head - atraumatic, normocephalic  Eyes - pupils equal and reactive to light, extraocular muscles intact  Ears - external ears are normal, hearing is grossly normal.  Ear canals are clear and tympanic membranes nonerythematous. Mouth - oral pharynx clear, mucous membranes moist.  Postoperative changes. Chest - clear to auscultation, no wheezes, rales or rhonchi, symmetric air entry  Heart - normal rate, regular rhythm, normal S1, S2, no murmurs, rubs, clicks or gallops  Abdomen -soft, nontender, nondistended  Neurological - alert, oriented, cranial nerves II-XII intact except diminished hearing in left ear; lower extremity peripheral neuropathy which is baseline; due to 5 strength in left hip and normal strength elsewhere; unsteady gait; Extremities - peripheral pulses normal, no pedal edema, no clubbing or cyanosis  Skin - warm and dry    Diagnostic data:   I have reviewed recent diagnostic testing including labs with patient today. An electronic signature was used to authenticate this note.     --Caitlyn Aden MD, MPH, [facility]  Supervised by Dr. Ned Hernandez

## 2021-02-08 ENCOUNTER — NURSE ONLY (OUTPATIENT)
Dept: LAB | Age: 51
End: 2021-02-08

## 2021-02-08 DIAGNOSIS — G93.40 ENCEPHALOPATHY: ICD-10-CM

## 2021-02-08 DIAGNOSIS — R56.9 SEIZURES (HCC): ICD-10-CM

## 2021-02-08 LAB
ALBUMIN SERPL-MCNC: 4.5 G/DL (ref 3.5–5.1)
ALP BLD-CCNC: 85 U/L (ref 38–126)
ALT SERPL-CCNC: 29 U/L (ref 11–66)
ANION GAP SERPL CALCULATED.3IONS-SCNC: 8 MEQ/L (ref 8–16)
AST SERPL-CCNC: 20 U/L (ref 5–40)
BILIRUB SERPL-MCNC: 0.6 MG/DL (ref 0.3–1.2)
BILIRUBIN URINE: NEGATIVE
BLOOD, URINE: NEGATIVE
BUN BLDV-MCNC: 9 MG/DL (ref 7–22)
CALCIUM SERPL-MCNC: 9.4 MG/DL (ref 8.5–10.5)
CHARACTER, URINE: CLEAR
CHLORIDE BLD-SCNC: 106 MEQ/L (ref 98–111)
CO2: 28 MEQ/L (ref 23–33)
COLOR: YELLOW
CREAT SERPL-MCNC: 0.7 MG/DL (ref 0.4–1.2)
ERYTHROCYTE [DISTWIDTH] IN BLOOD BY AUTOMATED COUNT: 12.8 % (ref 11.5–14.5)
ERYTHROCYTE [DISTWIDTH] IN BLOOD BY AUTOMATED COUNT: 41.8 FL (ref 35–45)
GFR SERPL CREATININE-BSD FRML MDRD: > 90 ML/MIN/1.73M2
GLUCOSE BLD-MCNC: 101 MG/DL (ref 70–108)
GLUCOSE URINE: >= 1000 MG/DL
HCT VFR BLD CALC: 46 % (ref 42–52)
HEMOGLOBIN: 16 GM/DL (ref 14–18)
KETONES, URINE: NEGATIVE
LEUKOCYTE ESTERASE, URINE: NEGATIVE
MCH RBC QN AUTO: 31.1 PG (ref 26–33)
MCHC RBC AUTO-ENTMCNC: 34.8 GM/DL (ref 32.2–35.5)
MCV RBC AUTO: 89.3 FL (ref 80–94)
NITRITE, URINE: NEGATIVE
PH UA: 6.5 (ref 5–9)
PLATELET # BLD: 227 THOU/MM3 (ref 130–400)
PMV BLD AUTO: 10.5 FL (ref 9.4–12.4)
POTASSIUM SERPL-SCNC: 4.6 MEQ/L (ref 3.5–5.2)
PROTEIN UA: NEGATIVE
RBC # BLD: 5.15 MILL/MM3 (ref 4.7–6.1)
SODIUM BLD-SCNC: 142 MEQ/L (ref 135–145)
SPECIFIC GRAVITY, URINE: > 1.03 (ref 1–1.03)
TOTAL PROTEIN: 7.5 G/DL (ref 6.1–8)
UROBILINOGEN, URINE: 1 EU/DL (ref 0–1)
VALPROIC ACID LEVEL: 57.2 UG/ML (ref 50–100)
WBC # BLD: 8.6 THOU/MM3 (ref 4.8–10.8)

## 2021-02-09 LAB — AMMONIA: 37 UMOL/L (ref 11–60)

## 2021-02-10 LAB — KEPPRA: 36 UG/ML (ref 12–46)

## 2021-02-11 LAB — LACOSAMIDE: 13.3 UG/ML (ref 5–10)

## 2021-02-12 ENCOUNTER — TELEPHONE (OUTPATIENT)
Dept: INTERNAL MEDICINE CLINIC | Age: 51
End: 2021-02-12

## 2021-02-12 NOTE — TELEPHONE ENCOUNTER
Dr. Hugo vaca's office called saying they have attempted to contact pt 2 x and his voicemail is full. They are asking us to have him give them a call.

## 2021-02-15 ENCOUNTER — TELEPHONE (OUTPATIENT)
Dept: INTERNAL MEDICINE CLINIC | Age: 51
End: 2021-02-15

## 2021-02-15 NOTE — TELEPHONE ENCOUNTER
----- Message from Cindy Worthington MD sent at 2/15/2021  2:38 PM EST -----  Let him know all labs normal - he is on 300 mg of Vimpat a day so normal is 2.5-18 and he was 13.3. How is he doing?

## 2021-02-25 ENCOUNTER — HOSPITAL ENCOUNTER (OUTPATIENT)
Dept: MRI IMAGING | Age: 51
Discharge: HOME | End: 2021-02-25
Payer: MEDICARE

## 2021-03-02 ENCOUNTER — TELEPHONE (OUTPATIENT)
Dept: INTERNAL MEDICINE CLINIC | Age: 51
End: 2021-03-02

## 2021-03-02 ENCOUNTER — OFFICE VISIT (OUTPATIENT)
Dept: INTERNAL MEDICINE CLINIC | Age: 51
End: 2021-03-02
Payer: MEDICARE

## 2021-03-02 VITALS
SYSTOLIC BLOOD PRESSURE: 104 MMHG | BODY MASS INDEX: 32.39 KG/M2 | HEIGHT: 74 IN | HEART RATE: 76 BPM | DIASTOLIC BLOOD PRESSURE: 70 MMHG | WEIGHT: 252.4 LBS | TEMPERATURE: 97.5 F

## 2021-03-02 DIAGNOSIS — E11.42 TYPE 2 DIABETES MELLITUS WITH DIABETIC POLYNEUROPATHY, WITH LONG-TERM CURRENT USE OF INSULIN (HCC): ICD-10-CM

## 2021-03-02 DIAGNOSIS — R79.89 ELEVATED CORTISOL LEVEL: ICD-10-CM

## 2021-03-02 DIAGNOSIS — E55.9 VITAMIN D DEFICIENCY: ICD-10-CM

## 2021-03-02 DIAGNOSIS — R41.82 ALTERED MENTAL STATUS, UNSPECIFIED ALTERED MENTAL STATUS TYPE: Primary | ICD-10-CM

## 2021-03-02 DIAGNOSIS — K21.9 GASTROESOPHAGEAL REFLUX DISEASE WITHOUT ESOPHAGITIS: ICD-10-CM

## 2021-03-02 DIAGNOSIS — E78.2 MIXED HYPERLIPIDEMIA: ICD-10-CM

## 2021-03-02 DIAGNOSIS — Z79.4 TYPE 2 DIABETES MELLITUS WITH DIABETIC POLYNEUROPATHY, WITH LONG-TERM CURRENT USE OF INSULIN (HCC): ICD-10-CM

## 2021-03-02 PROCEDURE — G8484 FLU IMMUNIZE NO ADMIN: HCPCS | Performed by: INTERNAL MEDICINE

## 2021-03-02 PROCEDURE — 99214 OFFICE O/P EST MOD 30 MIN: CPT | Performed by: INTERNAL MEDICINE

## 2021-03-02 PROCEDURE — G8427 DOCREV CUR MEDS BY ELIG CLIN: HCPCS | Performed by: INTERNAL MEDICINE

## 2021-03-02 PROCEDURE — 3017F COLORECTAL CA SCREEN DOC REV: CPT | Performed by: INTERNAL MEDICINE

## 2021-03-02 PROCEDURE — 3046F HEMOGLOBIN A1C LEVEL >9.0%: CPT | Performed by: INTERNAL MEDICINE

## 2021-03-02 PROCEDURE — 1036F TOBACCO NON-USER: CPT | Performed by: INTERNAL MEDICINE

## 2021-03-02 PROCEDURE — 2022F DILAT RTA XM EVC RTNOPTHY: CPT | Performed by: INTERNAL MEDICINE

## 2021-03-02 PROCEDURE — G8417 CALC BMI ABV UP PARAM F/U: HCPCS | Performed by: INTERNAL MEDICINE

## 2021-03-02 NOTE — PROGRESS NOTES
status changes but negative head CT and labs were stable. Still having issues with memory, couldn't find microwave in kitchen, can't remember SS#. He has been incontinent of urine 3-4x and thought may be having seizure. He fell yesterday with \"head issues\" - lightheaded, headache, balance issue. Hit head into freezer. No pain. Total of 3 falls since discharge -no other injuries. No change in neurological symptoms since fall. New tremor right upper extremity mostly but will have bilaterally hand tremor. Noted prior to the fall. Wife states she is getting his medications into him reliably and seizure medication levels were normal 2/2021. He is to see Dr. Juanpablo Mcmanus 3/23/21 - neurologist in [place] who specializes in his seizures. At last visit, gave him Ativan to use to help break seizure - he used 3x and has helped. O/w no new medications (never did fill narcotic after surgery). So altered mental status unlikely due to medication. Worked up for metabolic issues - labs done - UA negative, except elevated glucose and specific gravity. Normal CBC, ammonia, valproic acid, levetiracetam, and lacosamide level, CMP 2/8/21. No obvious cause of mental status changes. MRI - 2/25/21  Impression    Redemonstration of right frontal craniotomy with subjacent area of encephalomalacia in the right frontal lobe from prior surgery without evidence of acute intracranial abnormality. EEG 2/25/21:  IMPRESSION:  This is an abnormal EEG due to the presence of left frontal  sharp waves occasionally during the recording that may be epileptogenic  in nature, or indicates seizure tendency in the proper clinical context. This is also consistent with the patient's known history of seizure  disorder. No clinical seizures were observed. In addition, excessive  slowing was seen in the theta and delta range activity suggestive of  cortical dysfunction such as seen with encephalopathy.     Will call Dr. Juanpablo Mcmanus and let him know about EEG reading and suspect seizures are not controlled. Elevated cortisol level - patient has been slow to follow up with plan/testing for this. Patient was referred to Endocrinology - Dr. Jose Manuel Galdamez. To my knowledge he has not seen him since surgery due to the mental status changes. See urine cortisol levels 3/21/19 and 2/21/20 and salivary cortisol 12/28/18 and 12/29/20 - but salivary samples contaminated with blood. CT abdomen and pelvis 1/22/21, MRI brain 2/25/21. Symptoms include: obesity, sleep apnea, history of blood clots, right hip weakness, depression, memory loss, difficult to control DM (but dietary compliance issues). Follicular neoplasm of the thyroid - following with Dr. Isaac Pritchard, s/p left lobectomy 1/15/21. Sleep apnea - s/p uvulopalatopharyngoplasty. Unable to tolerate CPAP. DM - HgA1c 9.3 12/22/2020 - reportedly eating better, trying to get him to eat regularly. FSBS- typically checks 1-3x daily - typically 2. FSBS range , [address] FSBS (10am-1pm) 113-344, (8-9pm) . Numbers improved in the last one week - unsure if just due to eating better, knowing our visit is coming up? Most recent eye exam: 6/2/20 - Dr. Rossy Laguna. Minimal/mild DM retinopathy  Renal disease eGFR >90 2/8/21. Microalbumin/creatinine ratio: 59 on 12/29/2020. Denies new foot lesions, positive peripheral neuropathy - severe. LDL -total cholesterol 147, HDL 26, and triglycerides 860 on 12/29/2020. LDL unable to calculate due to triglyceride level. Autonomic dysfunction -as above. He is on 75 units Lantus morning and night plus sliding scale for lunch and dinner. 10 units plus sliding scale three times a day. Jardiance 25 mg daily. He typically eats twice a day - suggested eating 3 x a day and use insulin for all meals. Hypertension - BP controlled, continue metoprolol. GERD - stable, continue pantoprazole 40 mg BID.     Vitamin D deficiency - level 9 -> 16 9/1/2020 - currently on 50,000 IU weekly. Restless leg syndrome - stable - continue Requip 1 mg TID. Review of Systems   Constitutional: Negative for chills and fever. HENT: Negative for sore throat and trouble swallowing. Respiratory: Negative for cough, shortness of breath and wheezing. Cardiovascular: Negative for palpitations and leg swelling. Gastrointestinal: Negative for abdominal pain and blood in stool. Genitourinary: Negative for dysuria and hematuria. Musculoskeletal: Positive for gait problem (gait is unsteady, may be from severe peripheral neuropathy). Negative for neck pain. Skin: Negative for rash and wound. Neurological: Positive for tremors and seizures. Negative for syncope. Hematological: Negative for adenopathy. Does not bruise/bleed easily. Psychiatric/Behavioral: Positive for confusion. Negative for hallucinations and suicidal ideas. Physical Exam  Constitutional:       General: He is not in acute distress. Appearance: Normal appearance. He is obese. HENT:      Head: Normocephalic and atraumatic. Comments: No tenderness of skull/scalp, no nodules. No bruising. Neck:      Musculoskeletal: No muscular tenderness. Vascular: No carotid bruit. Cardiovascular:      Rate and Rhythm: Normal rate and regular rhythm. Heart sounds: No murmur. No friction rub. No gallop. Pulmonary:      Effort: Pulmonary effort is normal.      Breath sounds: No wheezing, rhonchi or rales. Abdominal:      General: Bowel sounds are normal. There is no distension. Palpations: Abdomen is soft. Musculoskeletal:         General: No tenderness. Right lower leg: No edema. Left lower leg: No edema. Lymphadenopathy:      Cervical: No cervical adenopathy. Skin:     General: Skin is warm and dry. Findings: No lesion. Neurological:      Mental Status: He is alert. Cranial Nerves: No cranial nerve deficit.       Sensory: Sensory deficit (significant peripheral neuropathy bilaterally LE) present. Motor: Weakness present. Gait: Gait abnormal (weaving, and right hip weakness 3/5.). Psychiatric:         Mood and Affect: Mood normal.         Behavior: Behavior normal.      Comments: He is able to give a recount of his recent history. His abnormal behavior is intermittent - forgetting where microwave is, etc.       Diagnostic date - reviewed labs from 2/2021, MRI brain, EEG. An electronic signature was used to authenticate this note.     --Morteza Lan MD

## 2021-03-03 ENCOUNTER — TELEPHONE (OUTPATIENT)
Dept: INTERNAL MEDICINE CLINIC | Age: 51
End: 2021-03-03

## 2021-03-03 DIAGNOSIS — R56.9 SEIZURES (HCC): ICD-10-CM

## 2021-03-03 RX ORDER — LORAZEPAM 0.5 MG/1
0.5 TABLET ORAL 2 TIMES DAILY PRN
Qty: 30 TABLET | Refills: 0 | Status: SHIPPED | OUTPATIENT
Start: 2021-03-03 | End: 2021-03-31

## 2021-03-03 NOTE — TELEPHONE ENCOUNTER
Pt called saying he had another seizure this am.  Lasted about 30 minutes. States he has had them more often since his surgery on 1/15/21. Says he feels shakey and has a hard time walking.   Just wanted to report it

## 2021-03-03 NOTE — TELEPHONE ENCOUNTER
I spoke with Dr. Cornelio Flores today about his case - reviewed findings/seizure activity and issues this am - he recommended increasing Zonesimide from 500 mg to 600 mg nightly, ok to continue prn Ativan if having seizures. He will be seeing patient 3/23/21. I reviewed his chart and confirmed with wife and he is only on 400 mg of Zonesimide (told wife of the discrepancy) - will increase by 100 mg nightly - which would be going to 500 mg a night. I left message on Dr. Abran Brower voice mail to let him know of the change. Wife states that she has give Hector 2 Ativan today - he is having a bad day today as far as mental status/walking. Will give Ativan refill #30 to get them to next neuro appt (only 4 tabs left from script I gave a month ago).

## 2021-03-08 RX ORDER — ERGOCALCIFEROL 1.25 MG/1
50000 CAPSULE ORAL WEEKLY
Qty: 12 CAPSULE | Refills: 1 | Status: SHIPPED | OUTPATIENT
Start: 2021-03-08 | End: 2022-04-26 | Stop reason: SDUPTHER

## 2021-03-08 RX ORDER — PANTOPRAZOLE SODIUM 40 MG/1
40 TABLET, DELAYED RELEASE ORAL 2 TIMES DAILY
Qty: 180 TABLET | Refills: 1 | Status: SHIPPED | OUTPATIENT
Start: 2021-03-08

## 2021-03-09 ENCOUNTER — CARE COORDINATION (OUTPATIENT)
Dept: CARE COORDINATION | Age: 51
End: 2021-03-09

## 2021-03-09 NOTE — CARE COORDINATION
I left the patient a message asking if he needs my assistance again this year on his high cost medications. I sent him re-enrollment forms back in November and have not heard back yet from him.         Charity Jiménez [facility]  [address]   Medication Assistance  [address]    (S)[phone]  (K)[phone]

## 2021-03-28 ASSESSMENT — ENCOUNTER SYMPTOMS
COUGH: 0
SORE THROAT: 0
ABDOMINAL PAIN: 0
WHEEZING: 0
BLOOD IN STOOL: 0
TROUBLE SWALLOWING: 0
SHORTNESS OF BREATH: 0

## 2021-03-29 ENCOUNTER — TELEPHONE (OUTPATIENT)
Dept: INTERNAL MEDICINE CLINIC | Age: 51
End: 2021-03-29

## 2021-03-29 NOTE — TELEPHONE ENCOUNTER
Spoke with patient's wife and Dhara has seen Dr. Ricco Pate . Dhara is being admitted 4/25 for 7 day EEG and to be weaned down on his medications then adjust them . Patient has not seen Dr. Justin vaca and wife is not aware of appointment . Referral was faxed in January and Dr. Moser Larger office was to contact them . Do you want me to see if Dhara can be seen before his admission 4/25 if possible ? Notified wife of fasting labs and he will have them done at [facility].

## 2021-03-29 NOTE — TELEPHONE ENCOUNTER
Message  Received: Mary Jo Duncan MD sent to Ishmael Everett LPN   Caller: Unspecified Elizabeth Radha,  5:21 PM)             Call wife and ask if he saw Dr. Andrey Ordaz and any new changes? Kendall Courtney is Melissa Rouse doing? Need LDL, trig, and vitamin D level. -Orders in chart - they are fasting. Ask wife if they ever saw Dr. Jeffery Arciniega in [facility] for his elevated cortisol level - does he have upcoming appointment? Jaden Fontaine we set that up.  Elevated cortisol levels can cause problems with depression, memory.

## 2021-04-02 ENCOUNTER — TELEPHONE (OUTPATIENT)
Dept: ENT CLINIC | Age: 51
End: 2021-04-02

## 2021-04-02 NOTE — LETTER
[address] EAR, NOSE AND THROAT  [facility]  Dept: [phone]  Dept Fax: [phone]      Neftaly Salmeron  [address] [place] [address] Road [facility]          4/12/2021    Dear  Corina Mariam: We were unable to reach you by phone. Please call our office at your earliest convenience. This message is regarding:  Scheduling an appointment . Please call between the hours of 8:00am and 4:30pm Monday through Friday if possible. Our number is [phone]. Thank you.        [facility] ENT Office

## 2021-04-02 NOTE — TELEPHONE ENCOUNTER
Antonio Caldwell called [facility] to cancel his follow up appointment 4/5/21. Antonio Caldwell states that he has some problems but not related to surgery with Dr. Chepe Machado. He would like [facility] to call his wife to reschedule his two month follow up. [facility] phone number is [phone].

## 2021-04-05 NOTE — TELEPHONE ENCOUNTER
Spoke to Cite Dayron and she stated she would like to call back and do it later. Patient can be scheduled in post-op spot at available.

## 2021-04-06 ENCOUNTER — NURSE ONLY (OUTPATIENT)
Dept: LAB | Age: 51
End: 2021-04-06

## 2021-04-06 DIAGNOSIS — E55.9 VITAMIN D DEFICIENCY: ICD-10-CM

## 2021-04-06 DIAGNOSIS — E78.2 MIXED HYPERLIPIDEMIA: ICD-10-CM

## 2021-04-06 LAB
LDL CHOLESTEROL DIRECT: 70.86 MG/DL
TRIGL SERPL-MCNC: 246 MG/DL (ref 0–199)
VITAMIN D 25-HYDROXY: 23 NG/ML (ref 30–100)

## 2021-04-08 ENCOUNTER — TELEPHONE (OUTPATIENT)
Dept: INTERNAL MEDICINE CLINIC | Age: 51
End: 2021-04-08

## 2021-04-08 NOTE — TELEPHONE ENCOUNTER
----- Message from Enma Steiner MD sent at 4/7/2021  4:14 PM EDT -----  Let him know vitamin D level slowly improving 9->16->23, trig down from 860->246, and LDL at goal - looks good.

## 2021-04-12 NOTE — TELEPHONE ENCOUNTER
Attempted to call patient's wife Dang Bowling. Got voicemail, left message to call the office. Letter sent.

## 2021-04-13 ENCOUNTER — OFFICE VISIT (OUTPATIENT)
Dept: INTERNAL MEDICINE CLINIC | Age: 51
End: 2021-04-13
Payer: MEDICARE

## 2021-04-13 VITALS
SYSTOLIC BLOOD PRESSURE: 118 MMHG | WEIGHT: 249.2 LBS | DIASTOLIC BLOOD PRESSURE: 90 MMHG | HEART RATE: 68 BPM | TEMPERATURE: 97.7 F | BODY MASS INDEX: 31.98 KG/M2 | HEIGHT: 74 IN

## 2021-04-13 DIAGNOSIS — E78.2 MIXED HYPERLIPIDEMIA: ICD-10-CM

## 2021-04-13 DIAGNOSIS — E11.42 TYPE 2 DIABETES MELLITUS WITH DIABETIC POLYNEUROPATHY, WITH LONG-TERM CURRENT USE OF INSULIN (HCC): Primary | ICD-10-CM

## 2021-04-13 DIAGNOSIS — Z79.4 TYPE 2 DIABETES MELLITUS WITH DIABETIC POLYNEUROPATHY, WITH LONG-TERM CURRENT USE OF INSULIN (HCC): Primary | ICD-10-CM

## 2021-04-13 DIAGNOSIS — R11.2 NON-INTRACTABLE VOMITING WITH NAUSEA, UNSPECIFIED VOMITING TYPE: ICD-10-CM

## 2021-04-13 DIAGNOSIS — R10.32 ABDOMINAL PAIN, ACUTE, BILATERAL LOWER QUADRANT: ICD-10-CM

## 2021-04-13 DIAGNOSIS — R41.82 ALTERED MENTAL STATUS, UNSPECIFIED ALTERED MENTAL STATUS TYPE: ICD-10-CM

## 2021-04-13 DIAGNOSIS — R10.31 ABDOMINAL PAIN, ACUTE, BILATERAL LOWER QUADRANT: ICD-10-CM

## 2021-04-13 DIAGNOSIS — E55.9 VITAMIN D DEFICIENCY: ICD-10-CM

## 2021-04-13 DIAGNOSIS — K21.9 GASTROESOPHAGEAL REFLUX DISEASE WITHOUT ESOPHAGITIS: ICD-10-CM

## 2021-04-13 LAB — HBA1C MFR BLD: 5.1 % (ref 4.3–5.7)

## 2021-04-13 PROCEDURE — 3044F HG A1C LEVEL LT 7.0%: CPT | Performed by: INTERNAL MEDICINE

## 2021-04-13 PROCEDURE — 83036 HEMOGLOBIN GLYCOSYLATED A1C: CPT | Performed by: INTERNAL MEDICINE

## 2021-04-13 PROCEDURE — G8417 CALC BMI ABV UP PARAM F/U: HCPCS | Performed by: INTERNAL MEDICINE

## 2021-04-13 PROCEDURE — 1036F TOBACCO NON-USER: CPT | Performed by: INTERNAL MEDICINE

## 2021-04-13 PROCEDURE — G8427 DOCREV CUR MEDS BY ELIG CLIN: HCPCS | Performed by: INTERNAL MEDICINE

## 2021-04-13 PROCEDURE — 3017F COLORECTAL CA SCREEN DOC REV: CPT | Performed by: INTERNAL MEDICINE

## 2021-04-13 PROCEDURE — 99214 OFFICE O/P EST MOD 30 MIN: CPT | Performed by: INTERNAL MEDICINE

## 2021-04-13 PROCEDURE — 2022F DILAT RTA XM EVC RTNOPTHY: CPT | Performed by: INTERNAL MEDICINE

## 2021-04-13 RX ORDER — BLOOD SUGAR DIAGNOSTIC
1 STRIP MISCELLANEOUS 3 TIMES DAILY
Qty: 300 EACH | Refills: 3 | Status: SHIPPED | OUTPATIENT
Start: 2021-04-13 | End: 2021-08-23 | Stop reason: SDUPTHER

## 2021-04-13 RX ORDER — PROMETHAZINE HYDROCHLORIDE 12.5 MG/1
12.5-25 TABLET ORAL EVERY 8 HOURS PRN
Qty: 60 TABLET | Refills: 1 | Status: ON HOLD | OUTPATIENT
Start: 2021-04-13 | End: 2021-08-04 | Stop reason: HOSPADM

## 2021-04-13 ASSESSMENT — ENCOUNTER SYMPTOMS
COUGH: 0
WHEEZING: 0
SORE THROAT: 0
ABDOMINAL PAIN: 1
BLOOD IN STOOL: 0
TROUBLE SWALLOWING: 0
SHORTNESS OF BREATH: 0

## 2021-04-13 NOTE — TELEPHONE ENCOUNTER
Called Dr. Trixie Hutson's office and they have contacted patient several times with no call back from patient . The office would not allow me to schedule appointment stating that the patient needs to call them to set it up . I gave patient's wife the phone number for Dr. Kim Holloway office and instructed her to call and schedule Hector's appointment .

## 2021-04-13 NOTE — PROGRESS NOTES
Ivan Santiago (:  1970) is a 48 y.o. male,Established patient, here for evaluation of the following chief complaint(s): Altered Mental Status (6 weeks / labs completed), Diabetes, Hyperlipidemia, and Other (elevated cortisol level , vitamin D def. )      ASSESSMENT/PLAN:   Diagnosis Orders   1. Type 2 diabetes mellitus with diabetic polyneuropathy, with long-term current use of insulin ([facility])  POCT glycosylated hemoglobin (Hb A1C)    09039 - Collection Capillary Blood Specimen    blood glucose test strips (ONETOUCH ULTRA) strip   2. Abdominal pain, acute, bilateral lower quadrant     3. Non-intractable vomiting with nausea, unspecified vomiting type  promethazine (PHENERGAN) 12.5 MG tablet   4. Altered mental status, unspecified altered mental status type     5. Mixed hyperlipidemia     6. Gastroesophageal reflux disease without esophagitis     7. Vitamin D deficiency       DM - focus on eating regularly, due to abdominal pain - if not eating and any glucose <90 - decrease Lantus by 5 Units BID and keep at that level and monitor. Send in FSBS at any time if questions. He is not needing short acting insulin and no FSBS <80 reportedly and numbers are controlled. Wife and daughter are monitoring his medications. LDL at goal off Lipitor last 3 months. Abdominal pain (bilateral lower quadrant- acute on chronic), nausea, diarrhea - does not appear to be constipation related. With diarrhea - try probiotic. With nausea - supported bland diet/Phenergan/PPI 40 mg BID. Zofran in past has not worked for his nausea. He has call into [facility] for urgent visit for issue - this is a chronic intermittent issue for him. CT abdomen with IV contrast has been done recently - no mention of ischemia (looking at this as pain with eating). If seizure medications are causing symptoms - monitor as will be coming off all medications with upcoming EEG.     Altered mental status/seizure disorder/elevated cortisol level - suspect due to seizures being uncontrolled, only other issue is history of elevated cortisol that he has not followed up with Endocrinology for yet. To ween off seizure medication and do a continuous EEG in [place] with Dr. Inna Weiss. Mixed hyperlipidemia - LDL and triglyceride level at goal 4/2021 - off Lipitor and fenofibrate since 1/2021. Will continue to hold medication. Will need to monitor closely once starts eating what he typically eats when not in controlled setting. GERD - stable, continue PPI. Vitamin D deficiency - level improving but not at goal - continue weekly 50,000 IU. Return in about 6 weeks (around 5/25/2021). to follow up DM and how he is eating, review mental status (follow up neurology work up), abdominal pain (follow up GI work up). SUBJECTIVE/OBJECTIVE:    Wife is present at visit today - very concerned about patient's mental status not improving. Seizure disorder - He did see Dr. Paco Pizano - to have 7 day EEG after ween off medication. Mental status - good days and bad days - unable to remember birthdate or SSN intermittently. Right hand tremor intermittent. He is to make appt with Dr. Rosita Aschoff in [facility] to discuss cortisol levels. DM - he is having GI issue: nausea, diarrhea, severe stomach pain x 2 weeks. Eating makes it worse. Just eating grapes and crackers/cereal/milk. He is drinking water but feels dry - dry lips. CT with IV contrast - negative for mention of ischemia. Just splenomegly. He has a call into [facility] to get visit (chronic abdominal pain and sees Dr. Argenis Hudson). Lantus - 75 BID but no short acting the last 2 weeks as numbers controlled. HgA1c 5.1. Suggested dropping Lantus if any low FSBS as not eating well. Given DM friendly protein drink samples. Continue with bland diet and consider probiotic. Vitamin D deficiency - level 9 -> 16 9/1/2020 -> 23 4/6/21 - continue on 50,000 IU weekly.     Hypercholesterolemia - LDL 70, trig 246 4/2021. He has been off Lipitor and fenofibrate since hospitalization 1/2021. Will hold off restarting Lipitor, as numbers at goal.  May need to restart fenofibrate if he returns to higher sugar diet when eating out, drinking more sugary drinks, when wife is not controlling his diet from home. GERD - stable, continue pantoprazole 40 mg BID. Discussed at previous visit:  Altered mental status - ENT surgery was on a Friday 1/15/21, oriented first few days but potassium dropped so extended stay, then liver enzymes spiked. Wife thought he had seizure in hospital and was post-ictal.  He has know seizure activity in 2 areas of the brain. He was discharged with mental status changes but negative head CT and labs were stable. Still having issues with memory, couldn't find microwave in kitchen, can't remember SS#. He has been incontinent of urine 3-4x and thought may be having seizure. He fell yesterday with \"head issues\" - lightheaded, headache, balance issue. Hit head into freezer. No pain. Total of 3 falls since discharge -no other injuries. No change in neurological symptoms since fall. New tremor right upper extremity mostly but will have bilaterally hand tremor. Noted prior to the fall. Wife states she is getting his medications into him reliably and seizure medication levels were normal 2/2021. He is to see Dr. Isidro Hahn 3/23/21 - neurologist in [place] who specializes in his seizures. At last visit, gave him Ativan to use to help break seizure - he used 3x and has helped. O/w no new medications (never did fill narcotic after surgery). So altered mental status unlikely due to medication. Worked up for metabolic issues - labs done - UA negative, except elevated glucose and specific gravity. Normal CBC, ammonia, valproic acid, levetiracetam, and lacosamide level, CMP 2/8/21. No obvious cause of mental status changes.     MRI - 2/25/21  Impression    Redemonstration of right frontal craniotomy with subjacent area of encephalomalacia in the right frontal lobe from prior surgery without evidence of acute intracranial abnormality. EEG 2/25/21:  IMPRESSION:  This is an abnormal EEG due to the presence of left frontal  sharp waves occasionally during the recording that may be epileptogenic  in nature, or indicates seizure tendency in the proper clinical context. This is also consistent with the patient's known history of seizure  disorder. No clinical seizures were observed. In addition, excessive  slowing was seen in the theta and delta range activity suggestive of  cortical dysfunction such as seen with encephalopathy. Will call Dr. Brendan Booth and let him know about EEG reading and suspect seizures are not controlled. Elevated cortisol level - patient has been slow to follow up with plan/testing for this. Patient was referred to Endocrinology - Dr. Moisés Escalante. To my knowledge he has not seen him since surgery due to the mental status changes. See urine cortisol levels 3/21/19 and 2/21/20 and salivary cortisol 12/28/18 and 12/29/20 - but salivary samples contaminated with blood. CT abdomen and pelvis 1/22/21, MRI brain 2/25/21. Symptoms include: obesity, sleep apnea, history of blood clots, right hip weakness, depression, memory loss, difficult to control DM (but dietary compliance issues). Follicular neoplasm of the thyroid - following with Dr. Nelia Sosa, s/p left lobectomy 1/15/21. Sleep apnea - s/p uvulopalatopharyngoplasty. Unable to tolerate CPAP. DM - HgA1c 9.3 12/22/2020 - reportedly eating better, trying to get him to eat regularly. FSBS- typically checks 1-3x daily - typically 2. FSBS range , [address] FSBS (10am-1pm) 113-344, (8-9pm) . Numbers improved in the last one week - unsure if just due to eating better, knowing our visit is coming up? Most recent eye exam: 6/2/20 - Dr. Elie Valero.   Minimal/mild DM retinopathy  Renal disease eGFR >90 2/8/21. Microalbumin/creatinine ratio: 59 on 12/29/2020. Denies new foot lesions, positive peripheral neuropathy - severe. LDL -total cholesterol 147, HDL 26, and triglycerides 860 on 12/29/2020. LDL unable to calculate due to triglyceride level. Autonomic dysfunction -as above. He is on 75 units Lantus morning and night plus sliding scale for lunch and dinner. 10 units plus sliding scale three times a day. Jardiance 25 mg daily. He typically eats twice a day - suggested eating 3 x a day and use insulin for all meals. Hypertension - BP controlled, continue metoprolol. Restless leg syndrome - stable - continue Requip 1 mg TID. Review of Systems   Constitutional: Negative for chills and fever. HENT: Negative for sore throat and trouble swallowing. Respiratory: Negative for cough, shortness of breath and wheezing. Cardiovascular: Negative for palpitations and leg swelling. Gastrointestinal: Positive for abdominal pain (bilateral lower abdominal pain). Negative for blood in stool. Genitourinary: Negative for difficulty urinating, dysuria and hematuria. Musculoskeletal: Positive for gait problem (gait is unsteady, may be from severe peripheral neuropathy). Negative for neck pain. Skin: Negative for rash and wound. Neurological: Positive for tremors, seizures and headaches (chonic intermittent). Negative for syncope. Hematological: Negative for adenopathy. Does not bruise/bleed easily. Psychiatric/Behavioral: Positive for confusion (intermittently). Negative for hallucinations and suicidal ideas. Physical Exam  Constitutional:       General: He is not in acute distress. Appearance: Normal appearance. He is obese. HENT:      Head: Normocephalic and atraumatic. Comments: No tenderness of skull/scalp, no nodules. No bruising. Neck:      Musculoskeletal: No muscular tenderness. Vascular: No carotid bruit.    Cardiovascular:      Rate and Rhythm: Normal rate and regular rhythm. Heart sounds: No murmur. No friction rub. No gallop. Pulmonary:      Effort: Pulmonary effort is normal.      Breath sounds: No wheezing, rhonchi or rales. Abdominal:      General: Bowel sounds are normal. There is no distension. Palpations: Abdomen is soft. Musculoskeletal:         General: No tenderness. Right lower leg: No edema. Left lower leg: No edema. Lymphadenopathy:      Cervical: No cervical adenopathy. Skin:     General: Skin is warm and dry. Findings: No lesion. Neurological:      Mental Status: He is alert. Cranial Nerves: No cranial nerve deficit. Sensory: Sensory deficit (significant peripheral neuropathy bilaterally LE) present. Motor: Weakness present. Gait: Gait abnormal (weaving, and right hip weakness 3/5.). Psychiatric:         Mood and Affect: Mood normal.         Behavior: Behavior normal.      Comments: He is able to give a recount of his recent history. His abnormal behavior is intermittent - forgetting where microwave is, etc.       Diagnostic date - reviewed labs from 4/2021 - vitamin D level, LDL, trig, HgA1c today 5.1 with patient. An electronic signature was used to authenticate this note.     --Yadira Aguirre MD

## 2021-04-16 NOTE — TELEPHONE ENCOUNTER
----- Message from Chapis Whyte MD sent at 12/30/2020  9:55 AM EST -----  Was he fasting for labs? Trig 860! Is he taking fenofibrate faithfully in the last 3 months. Are FSBS out of control? Need to control diet - stop sugar, limit carbs. Get FSBS for a week. Elevated microalbumin - BP high in hospital - start lisinopril 10 mg daily. Monitor BP - call if SBP <100. Encourage him to set up appt with GI for cause of chest pain. Opt out

## 2021-04-21 ENCOUNTER — HOSPITAL ENCOUNTER (OUTPATIENT)
Age: 51
Discharge: HOME | End: 2021-04-21
Payer: MEDICARE

## 2021-04-21 DIAGNOSIS — E11.42 TYPE 2 DIABETES MELLITUS WITH DIABETIC POLYNEUROPATHY, WITH LONG-TERM CURRENT USE OF INSULIN (HCC): ICD-10-CM

## 2021-04-21 DIAGNOSIS — Z79.4 TYPE 2 DIABETES MELLITUS WITH DIABETIC POLYNEUROPATHY, WITH LONG-TERM CURRENT USE OF INSULIN (HCC): ICD-10-CM

## 2021-04-21 LAB
ALBUMIN SERPL-MCNC: 4.5 G/DL (ref 3.5–5.1)
ALP BLD-CCNC: 92 U/L (ref 38–126)
ALT SERPL-CCNC: 23 U/L (ref 11–66)
ANION GAP SERPL CALCULATED.3IONS-SCNC: 11 MEQ/L (ref 8–16)
AST SERPL-CCNC: 14 U/L (ref 5–40)
BILIRUB SERPL-MCNC: 0.9 MG/DL (ref 0.3–1.2)
BILIRUBIN DIRECT: < 0.2 MG/DL (ref 0–0.3)
BUN BLDV-MCNC: 10 MG/DL (ref 7–22)
CALCIUM SERPL-MCNC: 9.3 MG/DL (ref 8.5–10.5)
CHLORIDE BLD-SCNC: 107 MEQ/L (ref 98–111)
CO2: 25 MEQ/L (ref 23–33)
CREAT SERPL-MCNC: 0.7 MG/DL (ref 0.4–1.2)
FOLATE: 12.2 NG/ML (ref 4.8–24.2)
GFR SERPL CREATININE-BSD FRML MDRD: > 90 ML/MIN/1.73M2
GLUCOSE BLD-MCNC: 236 MG/DL (ref 70–108)
INFLUENZA A: NOT DETECTED
INFLUENZA B: NOT DETECTED
POTASSIUM SERPL-SCNC: 4.3 MEQ/L (ref 3.5–5.2)
SARS-COV-2 RNA, RT PCR: NOT DETECTED
SODIUM BLD-SCNC: 143 MEQ/L (ref 135–145)
TOTAL PROTEIN: 7.3 G/DL (ref 6.1–8)
VITAMIN B-12: 768 PG/ML (ref 211–911)
VITAMIN D 25-HYDROXY: 18 NG/ML (ref 30–100)

## 2021-04-21 PROCEDURE — 87636 SARSCOV2 & INF A&B AMP PRB: CPT

## 2021-04-21 PROCEDURE — 84207 ASSAY OF VITAMIN B-6: CPT

## 2021-04-21 PROCEDURE — 82746 ASSAY OF FOLIC ACID SERUM: CPT

## 2021-04-21 PROCEDURE — 82306 VITAMIN D 25 HYDROXY: CPT

## 2021-04-21 PROCEDURE — 82607 VITAMIN B-12: CPT

## 2021-04-21 PROCEDURE — 36415 COLL VENOUS BLD VENIPUNCTURE: CPT

## 2021-04-21 PROCEDURE — 82248 BILIRUBIN DIRECT: CPT

## 2021-04-21 PROCEDURE — 80053 COMPREHEN METABOLIC PANEL: CPT

## 2021-04-21 NOTE — TELEPHONE ENCOUNTER
Spoke with patient and he said that he will call us back tomorrow when he wife is off so that they can schedule an appointment with Dr Lala Cano

## 2021-04-21 NOTE — TELEPHONE ENCOUNTER
the symptoms are unlikely to be directly related to surgery. More likely to be related to the various medications he is on including some of which are fairly high dosed. He can review his symptoms with Dr. Dary Vizcarra at the follow-up visit and see if he thinks differently or has any other recommendations. If his symptoms continue to worsen he should consider going to the ER for further evaluation. It would probably be beneficial for him to discuss this with his neurologist as well.

## 2021-04-21 NOTE — TELEPHONE ENCOUNTER
Hector called [facility] stating that our office contacted him. I let him know that our last phone call to him was around 4/12/21. Radha Garcia states that he has been having a lot of problems after surgery. Hector c/o memory problems, memory loss, a lot of walking problems. Radha Garcia states that he will need to check with his wife's schedule to reschedule the appointment from 4/5/21 to 5/12/21; his wife provides transportation.

## 2021-04-24 ENCOUNTER — HOSPITAL ENCOUNTER (OUTPATIENT)
Dept: CT IMAGING | Age: 51
Discharge: HOME | End: 2021-04-24
Payer: MEDICARE

## 2021-04-24 DIAGNOSIS — R11.2 NAUSEA AND VOMITING, INTRACTABILITY OF VOMITING NOT SPECIFIED, UNSPECIFIED VOMITING TYPE: ICD-10-CM

## 2021-04-24 DIAGNOSIS — R10.30 LOWER ABDOMINAL PAIN: ICD-10-CM

## 2021-04-24 DIAGNOSIS — R74.8 ELEVATED LIVER ENZYMES: ICD-10-CM

## 2021-04-24 PROCEDURE — 6360000004 HC RX CONTRAST MEDICATION: Performed by: NURSE PRACTITIONER

## 2021-04-24 PROCEDURE — 74177 CT ABD & PELVIS W/CONTRAST: CPT

## 2021-04-24 RX ADMIN — IOPAMIDOL 85 ML: 755 INJECTION, SOLUTION INTRAVENOUS at 10:49

## 2021-04-24 RX ADMIN — IOHEXOL 50 ML: 240 INJECTION, SOLUTION INTRATHECAL; INTRAVASCULAR; INTRAVENOUS; ORAL at 10:50

## 2021-04-27 NOTE — TELEPHONE ENCOUNTER
Attempted to call patient. Got voicemail, left message to call the office. Also, attempted to call patient's wife. Got voicemail, left message to call the office.

## 2021-06-28 ENCOUNTER — OFFICE VISIT (OUTPATIENT)
Dept: UROLOGY | Age: 51
End: 2021-06-28
Payer: MEDICARE

## 2021-06-28 ENCOUNTER — NURSE ONLY (OUTPATIENT)
Dept: LAB | Age: 51
End: 2021-06-28

## 2021-06-28 VITALS
BODY MASS INDEX: 32.6 KG/M2 | SYSTOLIC BLOOD PRESSURE: 124 MMHG | WEIGHT: 254 LBS | DIASTOLIC BLOOD PRESSURE: 86 MMHG | HEIGHT: 74 IN

## 2021-06-28 DIAGNOSIS — N13.8 BENIGN PROSTATIC HYPERPLASIA WITH URINARY OBSTRUCTION: Primary | ICD-10-CM

## 2021-06-28 DIAGNOSIS — N40.1 BENIGN PROSTATIC HYPERPLASIA WITH URINARY OBSTRUCTION: Primary | ICD-10-CM

## 2021-06-28 DIAGNOSIS — N40.1 BENIGN PROSTATIC HYPERPLASIA WITH URINARY OBSTRUCTION: ICD-10-CM

## 2021-06-28 DIAGNOSIS — R31.0 GROSS HEMATURIA: ICD-10-CM

## 2021-06-28 DIAGNOSIS — N13.8 BENIGN PROSTATIC HYPERPLASIA WITH URINARY OBSTRUCTION: ICD-10-CM

## 2021-06-28 DIAGNOSIS — N32.89 BLADDER WALL THICKENING: ICD-10-CM

## 2021-06-28 LAB — PROSTATE SPECIFIC ANTIGEN: 0.27 NG/ML (ref 0–1)

## 2021-06-28 PROCEDURE — G8427 DOCREV CUR MEDS BY ELIG CLIN: HCPCS | Performed by: UROLOGY

## 2021-06-28 PROCEDURE — 99214 OFFICE O/P EST MOD 30 MIN: CPT | Performed by: UROLOGY

## 2021-06-28 PROCEDURE — 1036F TOBACCO NON-USER: CPT | Performed by: UROLOGY

## 2021-06-28 PROCEDURE — G8417 CALC BMI ABV UP PARAM F/U: HCPCS | Performed by: UROLOGY

## 2021-06-28 PROCEDURE — 3017F COLORECTAL CA SCREEN DOC REV: CPT | Performed by: UROLOGY

## 2021-06-28 NOTE — PROGRESS NOTES
Dr. Cele Franz MD MD  [facility] Urology Clinic Consultation / New Patient Visit    Patient:  Falguni Tobias  YOB: 1970  Date: 6/28/2021  Consult requested from Marya Avila MD     HISTORY OF PRESENT ILLNESS:   The patient is a 46 y.o. male who presents today for follow-up for the following problem(s): BPH with bladder wall thickening. Overall the problem(s) : are worsening. Associated Symptoms: No dysuria, gross hematuria. Pain Severity:      Today visit:   6/28/21  Presents with history of BPH with bladder wall thickening seen on CT scan, delvin. Did see Marine Woody for BPH with retention. Has strong family history of Prostate cancer. Has history of gross hematuria. Summary of old records:   (Patient's old records, notes and chart reviewed and summarized above.)    Last several PSA's:  Lab Results   Component Value Date    PSA 0.28 08/18/2017    PSA 0.21 06/15/2015    PSA 0.20 03/06/2013       Last total testosterone:  No results found for: TESTOSTERONE    Urinalysis today:  No results found for this visit on 06/28/21.       Last BUN and creatinine:  Lab Results   Component Value Date    BUN 10 04/21/2021     Lab Results   Component Value Date    CREATININE 0.7 04/21/2021       Imaging Reviewed during this Office Visit:   (results were independently reviewed by physician and radiology report verified)    PAST MEDICAL, FAMILY AND SOCIAL HISTORY:  Past Medical History:   Diagnosis Date    Abnormal thyroid biopsy     s/p left hemithyroidectomy 6/3461 - follicular adenoma    Acid reflux     Anemia     resolved - previously seen by Dr. Delphine Jose (due to enlarged spleen)    Anesthesia     seizures with brain surgery due to blood sugar got really high    Bipolar disorder ([address]     Blood clot in vein 04/07/2016    [facility]     Cancer [facility]) 04/2019    thyroid    Chest pain     previously seeing [facility] cardiologist, now [facility]. [facility] (LAD bridging on cath 4/2016)    Chronic CARDIAC CATHETERIZATION      2011,5-6 years ago   [address]  3-2012    CARDIAC CATHETERIZATION  04/28/2016    [facility]     CARPAL TUNNEL RELEASE  01/2017    CHOLECYSTECTOMY  2004    COLONOSCOPY  2012    COLONOSCOPY  08/2016    2 tubular adenomas - reportedly needs repeat scope in 6 months    CRANIOTOMY  11/2012    arachnoid cyst drainage    EKG 12-LEAD  10/18/2015         ENDOSCOPY, COLON, DIAGNOSTIC      HERNIA REPAIR Right 1996    [facility]--Dr. Sayda Funk INGUINAL HERNIA REPAIR Right 09/15/2016    Robotic assisted    KNEE ARTHROSCOPY Right 2016    KNEE SURGERY Left 2007    acl and debrided twice    OTHER SURGICAL HISTORY  5-31-11    Tilt table was associated w/ nonspecific symptoms or nausea, otherwise no significant dizziness or syncope. No significant hemodynamic changes. Otherwise, unremarkable tilt table test after 30 minutes of tilting.  OTHER SURGICAL HISTORY  01/20/2017    RIGHT SHOULDER ARTHROSCOPY, OPEN STAN, OPEN ACROMIOPLASTY, BICEP TENDESIS, RIGHT CARPAL TUNNEL RELEASE    SHOULDER SURGERY Right 01/2007    THYROID LOBECTOMY N/A 1/15/2021    THYROID LOBECTOMY, UVULOPALATOPHARYNGOPLAST LAOP performed by Lilian Judd MD at [facility] ECHOCARDIOGRAM  3-05-11    LV size and systolic function normal. EF 55-65%.      UPPER GASTROINTESTINAL ENDOSCOPY  2012    UPPER GASTROINTESTINAL ENDOSCOPY  2016    Dr. Volodymyr Marquez Left 10/22/2019    EGD DILATION SAVORY performed by Yohana Schofield MD at [facility] Left 10/22/2019    EGD BIOPSY performed by Yohana Schofield MD at [address] [facility]   [facility]  2007     Family History   Problem Relation Age of Onset    Heart Disease Father 61        MI    Stroke Brother     Obesity Brother     Arthritis Mother     Seizures Mother     Obesity Sister     Other Brother         pulmonary embolism    Diabetes Daughter  Seizures Brother      Outpatient Medications Marked as Taking for the 6/28/21 encounter (Office Visit) with Melissa Pederson MD   Medication Sig Dispense Refill    insulin lispro (HUMALOG) 100 UNIT/ML injection vial Takes 10 units with meals plus group 2 sliding scale      promethazine (PHENERGAN) 12.5 MG tablet Take 1-2 tablets by mouth every 8 hours as needed for Nausea 60 tablet 1    blood glucose test strips (ONETOUCH ULTRA) strip 1 each by In Vitro route 3 times daily DX: E11.65 Dispense Onetouch Ultra 2 test strips 300 each 3    pantoprazole (PROTONIX) 40 MG tablet Take 1 tablet by mouth 2 times daily 180 tablet 1    vitamin D (ERGOCALCIFEROL) 1.25 MG (26104 UT) CAPS capsule Take 1 capsule by mouth once a week 12 capsule 1    insulin glargine (LANTUS SOLOSTAR) 100 UNIT/ML injection pen Inject 75 Units into the skin 2 times daily      gabapentin (NEURONTIN) 600 MG tablet Take 600 mg by mouth 4 times daily.  rOPINIRole (REQUIP) 1 MG tablet TAKE 1 TABLET BY MOUTH THREE TIMES A  tablet 1    metoprolol tartrate (LOPRESSOR) 50 MG tablet Take 1.5 tablets by mouth 2 times daily (Patient taking differently: Take 50 mg by mouth 2 times daily ) 270 tablet 1    empagliflozin (JARDIANCE) 25 MG tablet Take 25 mg by mouth daily 42 tablet 0    DULoxetine (CYMBALTA) 60 MG extended release capsule Take by mouth daily 2 tab      lacosamide (VIMPAT) 100 MG TABS tablet Take 300 mg by mouth 2 times daily.        sildenafil (VIAGRA) 100 MG tablet Take 1 tablet by mouth as needed for Erectile Dysfunction 45 tablet 3    divalproex (DEPAKOTE) 500 MG DR tablet Take 500 mg by mouth 2 times daily       levETIRAcetam (KEPPRA) 500 MG tablet Take 2,000 mg by mouth 2 times daily       zonisamide (ZONEGRAN) 100 MG capsule Take by mouth nightly 5 capsules         Ciprofloxacin-ciproflox hcl er, Heparin, Metformin, Niacin and related, Tramadol hcl, Ultram [tramadol], and Warfarin  Social History     Tobacco Use Smoking Status Never Smoker   Smokeless Tobacco Never Used       Social History     Substance and Sexual Activity   Alcohol Use No    Alcohol/week: 0.0 standard drinks       REVIEW OF SYSTEMS:  Constitutional: negative  Eyes: negative  Respiratory: negative  Cardiovascular: negative  Gastrointestinal: negative  Musculoskeletal: negative  Genitourinary: negative  Skin: negative   Neurological: negative  Hematological/Lymphatic: negative  Psychological: negative    Physical Exam:    This a 46 y.o. male   Vitals:    06/28/21 1027   BP: 124/86     Constitutional: Patient in no acute distress   Neuro: alert and oriented to person place and time. Psych: Mood and affect normal.  Head: atraumatic normocephalic  Eyes: EOMi  HEENT: neck supple, trachea midline  Lungs: Respiratory effort normal  Cardiovascular:  Normal peripheral pulses  Abdomen: Soft, non-tender, non-distended, No CVA  Bladder: non-tender and not distended. FROMx4, no cyanosis clubbing edema  Skin: warm and dry      Assessment and Plan      No diagnosis found. Plan:      No follow-ups on file.   Cysto for bladder wall thickening and hematuria

## 2021-07-20 ENCOUNTER — APPOINTMENT (OUTPATIENT)
Dept: GENERAL RADIOLOGY | Age: 51
DRG: 623 | End: 2021-07-20
Payer: MEDICARE

## 2021-07-20 ENCOUNTER — HOSPITAL ENCOUNTER (INPATIENT)
Age: 51
LOS: 7 days | Discharge: ANOTHER HOSPITAL | DRG: 623 | End: 2021-07-27
Attending: INTERNAL MEDICINE | Admitting: INTERNAL MEDICINE
Payer: MEDICARE

## 2021-07-20 ENCOUNTER — APPOINTMENT (OUTPATIENT)
Dept: CT IMAGING | Age: 51
DRG: 623 | End: 2021-07-20
Payer: MEDICARE

## 2021-07-20 ENCOUNTER — OFFICE VISIT (OUTPATIENT)
Dept: INTERNAL MEDICINE CLINIC | Age: 51
End: 2021-07-20
Payer: MEDICARE

## 2021-07-20 VITALS
HEIGHT: 74 IN | TEMPERATURE: 97.9 F | HEART RATE: 98 BPM | BODY MASS INDEX: 32.59 KG/M2 | DIASTOLIC BLOOD PRESSURE: 80 MMHG | SYSTOLIC BLOOD PRESSURE: 130 MMHG

## 2021-07-20 DIAGNOSIS — Z79.4 TYPE 2 DIABETES MELLITUS WITH DIABETIC POLYNEUROPATHY, WITH LONG-TERM CURRENT USE OF INSULIN (HCC): ICD-10-CM

## 2021-07-20 DIAGNOSIS — L97.909 DIABETIC LEG ULCER (HCC): Primary | ICD-10-CM

## 2021-07-20 DIAGNOSIS — L97.812 SKIN ULCER OF RIGHT PRETIBIAL REGION WITH FAT LAYER EXPOSED (HCC): Primary | ICD-10-CM

## 2021-07-20 DIAGNOSIS — E11.622 DIABETIC LEG ULCER (HCC): Primary | ICD-10-CM

## 2021-07-20 DIAGNOSIS — M79.672 PAIN OF LEFT HEEL: ICD-10-CM

## 2021-07-20 DIAGNOSIS — E11.42 TYPE 2 DIABETES MELLITUS WITH DIABETIC POLYNEUROPATHY, WITH LONG-TERM CURRENT USE OF INSULIN (HCC): ICD-10-CM

## 2021-07-20 PROBLEM — T14.8XXA WOUND INFECTION: Status: ACTIVE | Noted: 2021-07-20

## 2021-07-20 PROBLEM — L08.9 WOUND INFECTION: Status: ACTIVE | Noted: 2021-07-20

## 2021-07-20 LAB
ALBUMIN SERPL-MCNC: 4.3 G/DL (ref 3.5–5.1)
ALP BLD-CCNC: 107 U/L (ref 38–126)
ALT SERPL-CCNC: 16 U/L (ref 11–66)
ANION GAP SERPL CALCULATED.3IONS-SCNC: 11 MEQ/L (ref 8–16)
AST SERPL-CCNC: 12 U/L (ref 5–40)
BASOPHILS # BLD: 0.2 %
BASOPHILS ABSOLUTE: 0 THOU/MM3 (ref 0–0.1)
BILIRUB SERPL-MCNC: 0.9 MG/DL (ref 0.3–1.2)
BUN BLDV-MCNC: 9 MG/DL (ref 7–22)
C-REACTIVE PROTEIN: 3.46 MG/DL (ref 0–1)
CALCIUM SERPL-MCNC: 9.7 MG/DL (ref 8.5–10.5)
CHLORIDE BLD-SCNC: 101 MEQ/L (ref 98–111)
CO2: 25 MEQ/L (ref 23–33)
CREAT SERPL-MCNC: 0.7 MG/DL (ref 0.4–1.2)
EOSINOPHIL # BLD: 0.9 %
EOSINOPHILS ABSOLUTE: 0.1 THOU/MM3 (ref 0–0.4)
ERYTHROCYTE [DISTWIDTH] IN BLOOD BY AUTOMATED COUNT: 12.6 % (ref 11.5–14.5)
ERYTHROCYTE [DISTWIDTH] IN BLOOD BY AUTOMATED COUNT: 39.2 FL (ref 35–45)
GFR SERPL CREATININE-BSD FRML MDRD: > 90 ML/MIN/1.73M2
GLUCOSE BLD-MCNC: 306 MG/DL (ref 70–108)
GLUCOSE BLD-MCNC: 400 MG/DL (ref 70–108)
HBA1C MFR BLD: 8.1 % (ref 4.3–5.7)
HCT VFR BLD CALC: 44.6 % (ref 42–52)
HEMOGLOBIN: 15.9 GM/DL (ref 14–18)
IMMATURE GRANS (ABS): 0.05 THOU/MM3 (ref 0–0.07)
IMMATURE GRANULOCYTES: 0.5 %
LYMPHOCYTES # BLD: 15.4 %
LYMPHOCYTES ABSOLUTE: 1.4 THOU/MM3 (ref 1–4.8)
MCH RBC QN AUTO: 31 PG (ref 26–33)
MCHC RBC AUTO-ENTMCNC: 35.7 GM/DL (ref 32.2–35.5)
MCV RBC AUTO: 86.9 FL (ref 80–94)
MONOCYTES # BLD: 6.7 %
MONOCYTES ABSOLUTE: 0.6 THOU/MM3 (ref 0.4–1.3)
NUCLEATED RED BLOOD CELLS: 0 /100 WBC
OSMOLALITY CALCULATION: 289.3 MOSMOL/KG (ref 275–300)
PLATELET # BLD: 239 THOU/MM3 (ref 130–400)
PMV BLD AUTO: 9.9 FL (ref 9.4–12.4)
POTASSIUM REFLEX MAGNESIUM: 4.2 MEQ/L (ref 3.5–5.2)
RBC # BLD: 5.13 MILL/MM3 (ref 4.7–6.1)
SEG NEUTROPHILS: 76.3 %
SEGMENTED NEUTROPHILS ABSOLUTE COUNT: 7.1 THOU/MM3 (ref 1.8–7.7)
SODIUM BLD-SCNC: 137 MEQ/L (ref 135–145)
TOTAL PROTEIN: 6.8 G/DL (ref 6.1–8)
WBC # BLD: 9.3 THOU/MM3 (ref 4.8–10.8)

## 2021-07-20 PROCEDURE — 6370000000 HC RX 637 (ALT 250 FOR IP): Performed by: PEDIATRICS

## 2021-07-20 PROCEDURE — 87147 CULTURE TYPE IMMUNOLOGIC: CPT

## 2021-07-20 PROCEDURE — 86140 C-REACTIVE PROTEIN: CPT

## 2021-07-20 PROCEDURE — 2580000003 HC RX 258: Performed by: INTERNAL MEDICINE

## 2021-07-20 PROCEDURE — 6370000000 HC RX 637 (ALT 250 FOR IP): Performed by: PHYSICIAN ASSISTANT

## 2021-07-20 PROCEDURE — 87077 CULTURE AEROBIC IDENTIFY: CPT

## 2021-07-20 PROCEDURE — 99282 EMERGENCY DEPT VISIT SF MDM: CPT

## 2021-07-20 PROCEDURE — 0JBP0ZZ EXCISION OF LEFT LOWER LEG SUBCUTANEOUS TISSUE AND FASCIA, OPEN APPROACH: ICD-10-PCS | Performed by: PODIATRIST

## 2021-07-20 PROCEDURE — 73630 X-RAY EXAM OF FOOT: CPT

## 2021-07-20 PROCEDURE — 85025 COMPLETE CBC W/AUTO DIFF WBC: CPT

## 2021-07-20 PROCEDURE — 2022F DILAT RTA XM EVC RTNOPTHY: CPT | Performed by: STUDENT IN AN ORGANIZED HEALTH CARE EDUCATION/TRAINING PROGRAM

## 2021-07-20 PROCEDURE — 1036F TOBACCO NON-USER: CPT | Performed by: STUDENT IN AN ORGANIZED HEALTH CARE EDUCATION/TRAINING PROGRAM

## 2021-07-20 PROCEDURE — 73700 CT LOWER EXTREMITY W/O DYE: CPT

## 2021-07-20 PROCEDURE — 87040 BLOOD CULTURE FOR BACTERIA: CPT

## 2021-07-20 PROCEDURE — 87205 SMEAR GRAM STAIN: CPT

## 2021-07-20 PROCEDURE — 2580000003 HC RX 258: Performed by: PHYSICIAN ASSISTANT

## 2021-07-20 PROCEDURE — G8427 DOCREV CUR MEDS BY ELIG CLIN: HCPCS | Performed by: STUDENT IN AN ORGANIZED HEALTH CARE EDUCATION/TRAINING PROGRAM

## 2021-07-20 PROCEDURE — 6360000002 HC RX W HCPCS: Performed by: PHYSICIAN ASSISTANT

## 2021-07-20 PROCEDURE — 1200000000 HC SEMI PRIVATE

## 2021-07-20 PROCEDURE — 6370000000 HC RX 637 (ALT 250 FOR IP): Performed by: INTERNAL MEDICINE

## 2021-07-20 PROCEDURE — 87186 SC STD MICRODIL/AGAR DIL: CPT

## 2021-07-20 PROCEDURE — 87801 DETECT AGNT MULT DNA AMPLI: CPT

## 2021-07-20 PROCEDURE — 36415 COLL VENOUS BLD VENIPUNCTURE: CPT

## 2021-07-20 PROCEDURE — 3052F HG A1C>EQUAL 8.0%<EQUAL 9.0%: CPT | Performed by: STUDENT IN AN ORGANIZED HEALTH CARE EDUCATION/TRAINING PROGRAM

## 2021-07-20 PROCEDURE — 87070 CULTURE OTHR SPECIMN AEROBIC: CPT

## 2021-07-20 PROCEDURE — 99223 1ST HOSP IP/OBS HIGH 75: CPT | Performed by: INTERNAL MEDICINE

## 2021-07-20 PROCEDURE — 83036 HEMOGLOBIN GLYCOSYLATED A1C: CPT

## 2021-07-20 PROCEDURE — 82948 REAGENT STRIP/BLOOD GLUCOSE: CPT

## 2021-07-20 PROCEDURE — G8417 CALC BMI ABV UP PARAM F/U: HCPCS | Performed by: STUDENT IN AN ORGANIZED HEALTH CARE EDUCATION/TRAINING PROGRAM

## 2021-07-20 PROCEDURE — 80053 COMPREHEN METABOLIC PANEL: CPT

## 2021-07-20 PROCEDURE — 3017F COLORECTAL CA SCREEN DOC REV: CPT | Performed by: STUDENT IN AN ORGANIZED HEALTH CARE EDUCATION/TRAINING PROGRAM

## 2021-07-20 PROCEDURE — 99214 OFFICE O/P EST MOD 30 MIN: CPT | Performed by: STUDENT IN AN ORGANIZED HEALTH CARE EDUCATION/TRAINING PROGRAM

## 2021-07-20 PROCEDURE — 87075 CULTR BACTERIA EXCEPT BLOOD: CPT

## 2021-07-20 RX ORDER — NICOTINE POLACRILEX 4 MG
15 LOZENGE BUCCAL PRN
Status: DISCONTINUED | OUTPATIENT
Start: 2021-07-20 | End: 2021-07-27 | Stop reason: HOSPADM

## 2021-07-20 RX ORDER — BUPIVACAINE HYDROCHLORIDE 5 MG/ML
30 INJECTION, SOLUTION EPIDURAL; INTRACAUDAL ONCE
Status: DISCONTINUED | OUTPATIENT
Start: 2021-07-20 | End: 2021-07-27 | Stop reason: HOSPADM

## 2021-07-20 RX ORDER — DIVALPROEX SODIUM 500 MG/1
500 TABLET, DELAYED RELEASE ORAL 2 TIMES DAILY
Status: DISCONTINUED | OUTPATIENT
Start: 2021-07-20 | End: 2021-07-27 | Stop reason: HOSPADM

## 2021-07-20 RX ORDER — LEVETIRACETAM 500 MG/1
2000 TABLET ORAL 2 TIMES DAILY
Status: DISCONTINUED | OUTPATIENT
Start: 2021-07-20 | End: 2021-07-27 | Stop reason: HOSPADM

## 2021-07-20 RX ORDER — SODIUM CHLORIDE 9 MG/ML
25 INJECTION, SOLUTION INTRAVENOUS PRN
Status: DISCONTINUED | OUTPATIENT
Start: 2021-07-20 | End: 2021-07-27 | Stop reason: HOSPADM

## 2021-07-20 RX ORDER — INSULIN GLARGINE 100 [IU]/ML
75 INJECTION, SOLUTION SUBCUTANEOUS 2 TIMES DAILY
Status: DISCONTINUED | OUTPATIENT
Start: 2021-07-20 | End: 2021-07-27 | Stop reason: HOSPADM

## 2021-07-20 RX ORDER — PROMETHAZINE HYDROCHLORIDE 25 MG/1
12.5 TABLET ORAL EVERY 8 HOURS PRN
Status: DISCONTINUED | OUTPATIENT
Start: 2021-07-20 | End: 2021-07-27 | Stop reason: HOSPADM

## 2021-07-20 RX ORDER — ACETAMINOPHEN 325 MG/1
650 TABLET ORAL EVERY 6 HOURS PRN
Status: DISCONTINUED | OUTPATIENT
Start: 2021-07-20 | End: 2021-07-27 | Stop reason: HOSPADM

## 2021-07-20 RX ORDER — HYDROCODONE BITARTRATE AND ACETAMINOPHEN 5; 325 MG/1; MG/1
1 TABLET ORAL ONCE
Status: COMPLETED | OUTPATIENT
Start: 2021-07-20 | End: 2021-07-20

## 2021-07-20 RX ORDER — LACOSAMIDE 50 MG/1
200 TABLET ORAL 2 TIMES DAILY
Status: DISCONTINUED | OUTPATIENT
Start: 2021-07-20 | End: 2021-07-27 | Stop reason: HOSPADM

## 2021-07-20 RX ORDER — HYDROCODONE BITARTRATE AND ACETAMINOPHEN 5; 325 MG/1; MG/1
1 TABLET ORAL EVERY 8 HOURS PRN
Status: DISCONTINUED | OUTPATIENT
Start: 2021-07-20 | End: 2021-07-21

## 2021-07-20 RX ORDER — DEXTROSE MONOHYDRATE 25 G/50ML
12.5 INJECTION, SOLUTION INTRAVENOUS PRN
Status: DISCONTINUED | OUTPATIENT
Start: 2021-07-20 | End: 2021-07-27 | Stop reason: HOSPADM

## 2021-07-20 RX ORDER — SODIUM CHLORIDE 0.9 % (FLUSH) 0.9 %
5-40 SYRINGE (ML) INJECTION EVERY 12 HOURS SCHEDULED
Status: DISCONTINUED | OUTPATIENT
Start: 2021-07-20 | End: 2021-07-27 | Stop reason: HOSPADM

## 2021-07-20 RX ORDER — ROPINIROLE 1 MG/1
1 TABLET, FILM COATED ORAL 3 TIMES DAILY
Status: DISCONTINUED | OUTPATIENT
Start: 2021-07-20 | End: 2021-07-27 | Stop reason: HOSPADM

## 2021-07-20 RX ORDER — PANTOPRAZOLE SODIUM 40 MG/1
40 TABLET, DELAYED RELEASE ORAL 2 TIMES DAILY
Status: DISCONTINUED | OUTPATIENT
Start: 2021-07-21 | End: 2021-07-27 | Stop reason: HOSPADM

## 2021-07-20 RX ORDER — DULOXETIN HYDROCHLORIDE 60 MG/1
60 CAPSULE, DELAYED RELEASE ORAL DAILY
Status: DISCONTINUED | OUTPATIENT
Start: 2021-07-21 | End: 2021-07-27 | Stop reason: HOSPADM

## 2021-07-20 RX ORDER — ZONISAMIDE 100 MG/1
500 CAPSULE ORAL NIGHTLY
Status: DISCONTINUED | OUTPATIENT
Start: 2021-07-21 | End: 2021-07-27 | Stop reason: HOSPADM

## 2021-07-20 RX ORDER — DEXTROSE MONOHYDRATE 50 MG/ML
100 INJECTION, SOLUTION INTRAVENOUS PRN
Status: DISCONTINUED | OUTPATIENT
Start: 2021-07-20 | End: 2021-07-27 | Stop reason: HOSPADM

## 2021-07-20 RX ORDER — ONDANSETRON 2 MG/ML
4 INJECTION INTRAMUSCULAR; INTRAVENOUS ONCE
Status: COMPLETED | OUTPATIENT
Start: 2021-07-20 | End: 2021-07-20

## 2021-07-20 RX ORDER — GABAPENTIN 600 MG/1
600 TABLET ORAL 4 TIMES DAILY
Status: DISCONTINUED | OUTPATIENT
Start: 2021-07-20 | End: 2021-07-27 | Stop reason: HOSPADM

## 2021-07-20 RX ORDER — SODIUM CHLORIDE, SODIUM LACTATE, POTASSIUM CHLORIDE, CALCIUM CHLORIDE 600; 310; 30; 20 MG/100ML; MG/100ML; MG/100ML; MG/100ML
INJECTION, SOLUTION INTRAVENOUS CONTINUOUS
Status: DISCONTINUED | OUTPATIENT
Start: 2021-07-20 | End: 2021-07-24

## 2021-07-20 RX ORDER — MORPHINE SULFATE 4 MG/ML
4 INJECTION, SOLUTION INTRAMUSCULAR; INTRAVENOUS ONCE
Status: COMPLETED | OUTPATIENT
Start: 2021-07-20 | End: 2021-07-20

## 2021-07-20 RX ORDER — SODIUM CHLORIDE 0.9 % (FLUSH) 0.9 %
5-40 SYRINGE (ML) INJECTION PRN
Status: DISCONTINUED | OUTPATIENT
Start: 2021-07-20 | End: 2021-07-27 | Stop reason: HOSPADM

## 2021-07-20 RX ORDER — ACETAMINOPHEN 650 MG/1
650 SUPPOSITORY RECTAL EVERY 6 HOURS PRN
Status: DISCONTINUED | OUTPATIENT
Start: 2021-07-20 | End: 2021-07-27 | Stop reason: HOSPADM

## 2021-07-20 RX ADMIN — INSULIN LISPRO 4 UNITS: 100 INJECTION, SOLUTION INTRAVENOUS; SUBCUTANEOUS at 22:25

## 2021-07-20 RX ADMIN — DIVALPROEX SODIUM 500 MG: 500 TABLET, DELAYED RELEASE ORAL at 22:23

## 2021-07-20 RX ADMIN — LACOSAMIDE 200 MG: 50 TABLET, FILM COATED ORAL at 22:22

## 2021-07-20 RX ADMIN — LEVETIRACETAM 2000 MG: 500 TABLET, FILM COATED ORAL at 22:23

## 2021-07-20 RX ADMIN — INSULIN GLARGINE 75 UNITS: 100 INJECTION, SOLUTION SUBCUTANEOUS at 23:03

## 2021-07-20 RX ADMIN — HYDROCODONE BITARTRATE AND ACETAMINOPHEN 1 TABLET: 5; 325 TABLET ORAL at 16:31

## 2021-07-20 RX ADMIN — METOPROLOL TARTRATE 75 MG: 25 TABLET, FILM COATED ORAL at 22:23

## 2021-07-20 RX ADMIN — ONDANSETRON 4 MG: 2 INJECTION INTRAMUSCULAR; INTRAVENOUS at 18:29

## 2021-07-20 RX ADMIN — PROMETHAZINE HYDROCHLORIDE 12.5 MG: 25 TABLET ORAL at 22:22

## 2021-07-20 RX ADMIN — HYDROCODONE BITARTRATE AND ACETAMINOPHEN 1 TABLET: 5; 325 TABLET ORAL at 22:22

## 2021-07-20 RX ADMIN — ROPINIROLE HYDROCHLORIDE 1 MG: 1 TABLET, FILM COATED ORAL at 22:23

## 2021-07-20 RX ADMIN — SODIUM CHLORIDE, POTASSIUM CHLORIDE, SODIUM LACTATE AND CALCIUM CHLORIDE: 600; 310; 30; 20 INJECTION, SOLUTION INTRAVENOUS at 22:06

## 2021-07-20 RX ADMIN — GABAPENTIN 600 MG: 600 TABLET, FILM COATED ORAL at 22:23

## 2021-07-20 RX ADMIN — MORPHINE SULFATE 4 MG: 4 INJECTION, SOLUTION INTRAMUSCULAR; INTRAVENOUS at 18:28

## 2021-07-20 RX ADMIN — SODIUM CHLORIDE 25 ML: 9 INJECTION, SOLUTION INTRAVENOUS at 19:36

## 2021-07-20 RX ADMIN — PIPERACILLIN AND TAZOBACTAM 3375 MG: 3; .375 INJECTION, POWDER, LYOPHILIZED, FOR SOLUTION INTRAVENOUS at 18:48

## 2021-07-20 RX ADMIN — VANCOMYCIN HYDROCHLORIDE 1750 MG: 5 INJECTION, POWDER, LYOPHILIZED, FOR SOLUTION INTRAVENOUS at 19:36

## 2021-07-20 SDOH — ECONOMIC STABILITY: FOOD INSECURITY: WITHIN THE PAST 12 MONTHS, YOU WORRIED THAT YOUR FOOD WOULD RUN OUT BEFORE YOU GOT MONEY TO BUY MORE.: NEVER TRUE

## 2021-07-20 SDOH — ECONOMIC STABILITY: FOOD INSECURITY: WITHIN THE PAST 12 MONTHS, THE FOOD YOU BOUGHT JUST DIDN'T LAST AND YOU DIDN'T HAVE MONEY TO GET MORE.: NEVER TRUE

## 2021-07-20 ASSESSMENT — PAIN SCALES - GENERAL
PAINLEVEL_OUTOF10: 10

## 2021-07-20 ASSESSMENT — SOCIAL DETERMINANTS OF HEALTH (SDOH): HOW HARD IS IT FOR YOU TO PAY FOR THE VERY BASICS LIKE FOOD, HOUSING, MEDICAL CARE, AND HEATING?: SOMEWHAT HARD

## 2021-07-20 ASSESSMENT — PAIN DESCRIPTION - PAIN TYPE: TYPE: ACUTE PAIN

## 2021-07-20 ASSESSMENT — PAIN DESCRIPTION - LOCATION: LOCATION: LEG;FOOT

## 2021-07-20 NOTE — PROGRESS NOTES
1970    Chief Complaint   Patient presents with    Other     lesions on right leg     Foot Pain     left heel pain     Diabetes     HPI     Pt is a 46 y.o. male who presents for an urgent visit. Patient last seen 4/13/21. Left heel pain - started 3 days ago, severe pain 10/10, no falls, has been mostly in bed. Tried ibuprofen, ice packs without much relief. Unable to get comfortable. On physical exam, extremely tender. Needs imaging. Two sores on the right leg - Larger one appeared about 3 weeks ago, it was a tiny scratch that turned into a painful ulcer. Smaller ones appeared 2 days ago that was tiny at the beginning. Patient has been in bed a lot and not sure if he injured his foot. Tried neosporin antibiotic. With no success. Serosanginous, yellow based to it. Smaller ulcer needs culture and drainage. Needs IV antibiotics. Diabetes mellitus - patient is on Humalog 10 units with meals. At last visit patient was encouraged to eat regularly. States that he is not adhering to any diet. Glucose ranges between 200s. Wife and daughter are monitoring his medications. Abdominal pain - at last visit patient was having nausea, diarrhea and was advised to use probiotic for the diarrhea, bland diet/Phenergan/PPI. He has an appointment for upper EGD with Dr. Mayra Swann. AMS/Seizure disorder - patient is following with Dr. Luciano Oh. He went at [address] [place] [address] 4/25 for seizure classification. Restarted his medications. Decreased the dose of Vimpat. He did not follow up with physical therapy. Reports that he feels back to normal He was noticed to be very unstable on his feet. Last seizure was a week ago. Mixed hyperlipidemia - Not on any statin therapy, off of Lipitor and fenofibrate since 1/2021. Last LDL 70 4/2021.              Past Medical History:   Diagnosis Date    Abnormal thyroid biopsy     s/p left hemithyroidectomy 6/8933 - follicular adenoma    Acid reflux     Anemia     resolved - previously seen by Dr. Caitlin Brian (due to enlarged spleen)    Anesthesia     seizures with brain surgery due to blood sugar got really high    Bipolar disorder ([address]     Blood clot in vein 04/07/2016    Yadira Coma     Cancer [facility]) 04/2019    thyroid    Chest pain     previously seeing [address] [place] [address] cardiologist, now seeing Dr. Luke Carbajal (LAD bridging on cath 4/2016)    Chronic back pain     Chronic bronchitis ([address]     Dr. Elkins Seen Colon polyp 08/30/2016    Depression     seeing Aditi Poster DVT (deep venous thrombosis) ([facility] [id number]    not on Coumadin due to unable to regulate - Dr. Amy Ny - no etiology found per patient    Esophageal abnormality     nodule     Fatty liver disease, nonalcoholic     U/S 18/9146 [facility]    [facility]     legs    History of Doppler ultrasound 05/29/2011    No hemodynamically significant carotid stenosis is identified. A thyroid nodule on each side. Dedicated ultrasound of thyroid gland is suggested to further evaluate if clinically indicated.      History of pulmonary embolism 2007    s/p GFF, related to knee surgery    Hx of blood clots     leg and lung    Hyperlipidemia     severely elevated triglycerides    Hypertension     diastolic    Intracranial arachnoid cyst 11/2012    Dr. Gaurang Blackmon drained, complicated with seizures, DKA    Irritable bowel syndrome     Liver disease     Arloa Second - elevated LFT - positive smooth muscle antibody, steatosis per liver bx 3/2014 with Dr. Dennise Soto (multiple drug resistant organisms) resistance 2012    MRSA (methicillin resistant staph aureus) culture positive     h/o in foot and before brain surgery    Nephrolithiasis     noted on CT abdomen 9/2016, 6/2019    Neuropathy     Pancreatic insufficiency     Positive SANDY (antinuclear antibody)     Dr. Pop Console - first visit in 6/2013    Prolonged emergence from general anesthesia     Restless legs syndrome     Seizures ([address]     started with brain surgery    Sinus tachycardia     Holter 3/2013 - seeing Dr. Bautista Speak Skull fracture [facility])     clips     Sleep apnea     positive sleep apnea per study 10/2020.  Systolic dysfunction     \"systolic bridge\"  EF 64% ECHO 9/2019    Type II or unspecified type diabetes mellitus without mention of complication, not stated as uncontrolled 2007       Past Surgical History:   Procedure Laterality Date    CARDIAC CATHETERIZATION      2011,5-6 years ago   [address]  3-2012   [address]  04/28/2016    [facility]     CARPAL TUNNEL RELEASE  01/2017    CHOLECYSTECTOMY  2004    COLONOSCOPY  2012    COLONOSCOPY  08/2016    2 tubular adenomas - reportedly needs repeat scope in 6 months    CRANIOTOMY  11/2012    arachnoid cyst drainage    EKG 12-LEAD  10/18/2015         ENDOSCOPY, COLON, DIAGNOSTIC      HERNIA REPAIR Right 1996    [facility]--Dr. Jennifer Jarvis INGUINAL HERNIA REPAIR Right 09/15/2016    Robotic assisted    KNEE ARTHROSCOPY Right 2016    KNEE SURGERY Left 2007    acl and debrided twice    OTHER SURGICAL HISTORY  5-31-11    Tilt table was associated w/ nonspecific symptoms or nausea, otherwise no significant dizziness or syncope. No significant hemodynamic changes. Otherwise, unremarkable tilt table test after 30 minutes of tilting.  OTHER SURGICAL HISTORY  01/20/2017    RIGHT SHOULDER ARTHROSCOPY, OPEN STAN, OPEN ACROMIOPLASTY, BICEP TENDESIS, RIGHT CARPAL TUNNEL RELEASE    SHOULDER SURGERY Right 01/2007    THYROID LOBECTOMY N/A 1/15/2021    THYROID LOBECTOMY, UVULOPALATOPHARYNGOPLAST LAOP performed by Amalia Olivares MD at [facility] ECHOCARDIOGRAM  3-05-11    LV size and systolic function normal. EF 55-65%.      UPPER GASTROINTESTINAL ENDOSCOPY  2012    UPPER GASTROINTESTINAL ENDOSCOPY  2016    Dr. Izabella Villanueva Left 10/22/2019    EGD DILATION SAVORY performed by Kevyn Leiva MD at [facility] ENDOSCOPY Left 10/22/2019    EGD BIOPSY performed by Genny Atkinson MD at [address]  2007       Current Outpatient Medications   Medication Sig Dispense Refill    insulin lispro (HUMALOG) 100 UNIT/ML injection vial Takes 10 units with meals plus group 2 sliding scale      promethazine (PHENERGAN) 12.5 MG tablet Take 1-2 tablets by mouth every 8 hours as needed for Nausea 60 tablet 1    blood glucose test strips (ONETOUCH ULTRA) strip 1 each by In Vitro route 3 times daily DX: E11.65 Dispense Onetouch Ultra 2 test strips 300 each 3    pantoprazole (PROTONIX) 40 MG tablet Take 1 tablet by mouth 2 times daily 180 tablet 1    vitamin D (ERGOCALCIFEROL) 1.25 MG (57122 UT) CAPS capsule Take 1 capsule by mouth once a week 12 capsule 1    insulin glargine (LANTUS SOLOSTAR) 100 UNIT/ML injection pen Inject 75 Units into the skin 2 times daily      gabapentin (NEURONTIN) 600 MG tablet Take 600 mg by mouth 4 times daily.  rOPINIRole (REQUIP) 1 MG tablet TAKE 1 TABLET BY MOUTH THREE TIMES A  tablet 1    metoprolol tartrate (LOPRESSOR) 50 MG tablet Take 1.5 tablets by mouth 2 times daily (Patient taking differently: Take 50 mg by mouth 2 times daily ) 270 tablet 1    empagliflozin (JARDIANCE) 25 MG tablet Take 25 mg by mouth daily 42 tablet 0    DULoxetine (CYMBALTA) 60 MG extended release capsule Take by mouth daily 2 tab      lacosamide (VIMPAT) 100 MG TABS tablet Take 200 mg by mouth 2 times daily.        sildenafil (VIAGRA) 100 MG tablet Take 1 tablet by mouth as needed for Erectile Dysfunction 45 tablet 3    divalproex (DEPAKOTE) 500 MG DR tablet Take 500 mg by mouth 2 times daily       levETIRAcetam (KEPPRA) 500 MG tablet Take 2,000 mg by mouth 2 times daily       zonisamide (ZONEGRAN) 100 MG capsule Take by mouth nightly 5 capsules      insulin lispro, 1 Unit Dial, (HUMALOG KWIKPEN) 100 UNIT/ML SOPN Inject 10 Units into the skin 3 times daily Plus sliding scale 2 9 mL 0     No current Status:    Intimate Partner Violence:     Fear of Current or Ex-Partner:     Emotionally Abused:     Physically Abused:     Sexually Abused:        Family History   Problem Relation Age of Onset    Heart Disease Father 61        MI    Stroke Brother     Obesity Brother     Arthritis Mother     Seizures Mother     Obesity Sister     Other Brother         pulmonary embolism    Diabetes Daughter     Seizures Brother        Review of Systems - General ROS: negative for - chills or fever  Psychological ROS: negative for - anxiety and depression  Hematological and Lymphatic ROS: No history of blood clots or bleeding disorder. Respiratory ROS: no cough, shortness of breath, or wheezing  Cardiovascular ROS: no chest pain or dyspnea on exertion, tolerates a flight of stairs, vacuuming  Gastrointestinal ROS: no abdominal pain, change in bowel habits, or black or bloody stools  Genito-Urinary ROS: no dysuria, trouble voiding, or hematuria  Musculoskeletal ROS: negative for - muscle pain or muscular weakness  Neurological ROS: negative for - headaches, numbness/tingling, seizures or weakness  Dermatological ROS: negative for - rash or skin lesion changes    Blood pressure 130/80, pulse 98, temperature 97.9 °F (36.6 °C), height 6' 2.02\" (1.88 m).     Physical Examination: General appearance -alert, well appearing, and in no distress  Mental status - alert, oriented to person, place, and time  Head - atraumatic, normocephalic  Eyes - pupils equal and reactive to light, extraocular muscles intact  Ears - external ears are normal, hearing is grossly normal  Mouth - oral pharynx clear, mucous membranes moist  Neck - supple, no significant adenopathy  Chest - clear to auscultation, no wheezes, rales or rhonchi, symmetric air entry  Heart - normal rate, regular rhythm, normal S1, S2, no murmurs, rubs, clicks or gallops  Abdomen -soft, nontender, nondistended  Neurological - alert, oriented, cranial nerves II-XII intact, motor and sensation are grossly intact bilateral upper and lower extremities. Extremities - peripheral pulses normal, no pedal edema, no clubbing or cyanosis, right upper anterior shin with stage 2 ulcer with yellow eschar and draining orange serious fluid surrounded by erythema and tenderness. Below ulcer is a 2 cm covered area of fluctuance surrounded by area of erythema draining orange serious fluid and tenderness. Left heel - extreme tenderness, edema, no redness or warmth. Linear thickening of skin that appears a sight for puncture wound. Skin - warm and dry    Diagnostic data:   I have reviewed recent diagnostic testing including labs with patient today. Assessment and Plan:     Diagnosis Orders   1. Skin ulcer of right pretibial region with fat layer exposed ([address]     2. Pain of left heel     3. Type 2 diabetes mellitus with diabetic polyneuropathy, with long-term current use of insulin ([facility])  POCT glycosylated hemoglobin (Hb A1C)    35345 - Collection Capillary Blood Specimen     Skin ulcers of right pretibial region - Stage 2 ulcer present for 3 weeks with serious drainage, tender to the touch. Patient is diabetic. Needs IV antibiotics, aerobic/anaeroic culture and possible I&D. Sending patient for inpatient admission for further assessment and treatment. Pain of left heel - severe pain in the heel. Patient is diabetic with severe neuropathy. Concern for osteomyelitis. Needs further assessment. Zandra Garcia MD PGY-2  . darrick Zahida  INTERNAL MEDICINE  [address]  Dept: [phone]  Dept Fax: 30 : [phone]

## 2021-07-20 NOTE — ED NOTES
Pt lying in bed with podiatry at the bedside perform I&D of abscess on foot. Patient updated on plan of care at this time and expresses no concerns regarding treatment. Patient VSS. Respirations are regular and unlabored, patient is alert and oriented x4. Patient bed rails up x2 and call light within reach. Will continue to monitor.        Viviana Davis RN  07/20/21 1800

## 2021-07-20 NOTE — ED PROVIDER NOTES
[facility]  eMERGENCY dEPARTMENT eNCOUnter          CHIEF COMPLAINT       Chief Complaint   Patient presents with    Abscess    Foot Pain       Nurses Notes reviewed and I agree except as noted inthe HPI. HISTORY OF PRESENT ILLNESS    Gautam Rodriguez is a 46 y.o. male who presents to the Emergency Department for the evaluation of abscess. Patient states that he has a spot on his right leg that is been present for the past 3 weeks, slightly worsening but not improving. He states that he also developed a new site on his right leg more distal 2 days ago which has been getting progressively worse. He does report history of MRSA. He has been washing with Hibiclens and applying triple antibiotic ointment. Wound has been cared for by his wife who is an ER nurse but they have continued to worsen. He saw his PCP today for these and was sent to the ED for admission, IV antibiotics and culture. He has history of diabetes, states that his glucose has been running in the 240s. He had an A1c a few months ago 5.1, A1c drawn today was 8.4. He has chronic neuropathy, typically has numbness from the mid lower leg distally and denies any new numbness or tingling. Reports pain in his left heel however over the past 3 days without any known injury. States that it is so painful that he is having difficulty sleeping. He has previously seen Dr. Alejandro Gaines of podiatry but has not followed with anyone for his current wounds. No fevers. The HPI was provided by the patient. REVIEW OF SYSTEMS     Review of Systems   Musculoskeletal: Positive for arthralgias. Skin: Positive for wound. All other systems reviewed and are negative.       PAST MEDICAL HISTORY    has a past medical history of Abnormal thyroid biopsy, Acid reflux, Anemia, Anesthesia, Bipolar disorder ([address] Blood clot in vein, Cancer ([address] Chest pain, Chronic back pain, Chronic bronchitis ([address] Colon polyp, Depression, DVT (deep venous thrombosis) ([facility] Esophageal abnormality, Fatty liver disease, nonalcoholic, Furuncle, History of Doppler ultrasound, History of pulmonary embolism, Hx of blood clots, Hyperlipidemia, Hypertension, Intracranial arachnoid cyst, Irritable bowel syndrome, Liver disease, MDRO (multiple drug resistant organisms) resistance, MRSA (methicillin resistant staph aureus) culture positive, Nephrolithiasis, Neuropathy, Pancreatic insufficiency, Positive SANDY (antinuclear antibody), Prolonged emergence from general anesthesia, Restless legs syndrome, Seizures (HCC), Sinus tachycardia, Skull fracture ([facility] Sleep apnea, Systolic dysfunction, and Type II or unspecified type diabetes mellitus without mention of complication, not stated as uncontrolled. SURGICAL HISTORY      has a past surgical history that includes knee surgery (Left, 2007); Vena Cava Filter Placement (2007); hernia repair (Right, 1996); Cholecystectomy (2004); Upper gastrointestinal endoscopy (2012); transthoracic echocardiogram (3-05-11); other surgical history (5-31-11); Cardiac catheterization; Cardiac catheterization (3-2012); craniotomy (11/2012); Tonsillectomy; Endoscopy, colon, diagnostic; EKG 12 Lead (10/18/2015); Knee arthroscopy (Right, 2016); Cardiac catheterization (04/28/2016); Upper gastrointestinal endoscopy (2016); Inguinal hernia repair (Right, 09/15/2016); Colonoscopy (2012); Colonoscopy (08/2016); other surgical history (01/20/2017); shoulder surgery (Right, 01/2007); Carpal tunnel release (01/2017); Upper gastrointestinal endoscopy (Left, 10/22/2019); Upper gastrointestinal endoscopy (Left, 10/22/2019); and Thyroid lobectomy (N/A, 1/15/2021).     CURRENT MEDICATIONS       Previous Medications    BLOOD GLUCOSE TEST STRIPS (ONETOUCH ULTRA) STRIP    1 each by In Vitro route 3 times daily DX: E11.65 Dispense Onetouch Ultra 2 test strips    DIVALPROEX (DEPAKOTE) 500 MG DR TABLET    Take 500 mg by mouth 2 times daily     DULOXETINE (CYMBALTA) 60 MG Seizures in his brother and mother; Stroke in his brother. SOCIAL HISTORY      reports that he has never smoked. He has never used smokeless tobacco. He reports that he does not drink alcohol and does not use drugs. PHYSICAL EXAM     INITIAL VITALS:  height is 6' 2\" (1.88 m) and weight is 245 lb (111.1 kg). His oral temperature is 97.7 °F (36.5 °C). His blood pressure is 144/80 (abnormal) and his pulse is 96. His respiration is 18 and oxygen saturation is 97%. Physical Exam  Vitals and nursing note reviewed. Constitutional:       Appearance: Normal appearance. HENT:      Head: Normocephalic and atraumatic. Eyes:      Conjunctiva/sclera: Conjunctivae normal.   Cardiovascular:      Rate and Rhythm: Normal rate. Pulses: Normal pulses. Dorsalis pedis pulses are 2+ on the right side and 2+ on the left side. Posterior tibial pulses are 2+ on the right side and 2+ on the left side. Pulmonary:      Effort: Pulmonary effort is normal. No respiratory distress. Abdominal:      Palpations: Abdomen is soft. Tenderness: There is no abdominal tenderness. Musculoskeletal:      Cervical back: Normal range of motion. Left lower leg: Normal.      Left ankle: Swelling present. Tenderness present. Right foot: Normal.      Left foot: Normal capillary refill. Swelling and tenderness present. Normal pulse. Comments: Wounds noted to right lower extremity in the proximal lower leg, see photo below. Dried yellow crusting noted. Skin:     General: Skin is warm. Neurological:      General: No focal deficit present. Mental Status: He is alert.    Psychiatric:         Mood and Affect: Mood normal.             DIFFERENTIAL DIAGNOSIS:   Differential diagnoses are discussed    DIAGNOSTIC RESULTS     EKG: All EKG's are interpreted by the Emergency Department Physician who either signs or Co-signsthis chart in the absence of a cardiologist.        RADIOLOGY: non-plain film images(s) such as CT, Ultrasound and MRI are read by the radiologist.    XR FOOT LEFT (MIN 3 VIEWS)   Final Result   Plantar calcaneal spur. Otherwise normal left foot. **This report has been created using voice recognition software. It may contain minor errors which are inherent in voice recognition technology. **      Final report electronically signed by Dr. Kira Westbrook on 7/20/2021 3:57 PM          LABS:      Labs Reviewed   CBC WITH AUTO DIFFERENTIAL - Abnormal; Notable for the following components:       Result Value    MCHC 35.7 (*)     All other components within normal limits   COMPREHENSIVE METABOLIC PANEL W/ REFLEX TO MG FOR LOW K - Abnormal; Notable for the following components:    Glucose 400 (*)     All other components within normal limits   C-REACTIVE PROTEIN - Abnormal; Notable for the following components:    CRP 3.46 (*)     All other components within normal limits   CULTURE, ANAEROBIC AND AEROBIC   CULTURE, ANAEROBIC AND AEROBIC   CULTURE, ANAEROBIC AND AEROBIC   CULTURE, BLOOD 1   CULTURE, BLOOD 2   ANION GAP   GLOMERULAR FILTRATION RATE, ESTIMATED   OSMOLALITY       EMERGENCY DEPARTMENT COURSE:   Vitals:    Vitals:    07/20/21 1528   BP: (!) 144/80   Pulse: 96   Resp: 18   Temp: 97.7 °F (36.5 °C)   TempSrc: Oral   SpO2: 97%   Weight: 245 lb (111.1 kg)   Height: 6' 2\" (1.88 m)      5:21 PM EDT: The patient was seen and evaluated. Patient presents for complaints of right lower extremity lesions. 1 has been ongoing for 3 weeks, another began 2 days ago and was more rapidly progressive. He also complains of some left foot pain and swelling without any injury with primary area of pain in the left heel. He arrives with mild hypertension, otherwise reassuring vital signs. Was given Norco initially for pain. Labs are overall reassuring other than notable hyperglycemia without evidence of acidosis.   X-ray of the left foot shows a plantar calcaneal spur with otherwise normal findings. Podiatry service was notified. They did evaluate the patient in the ED and perform debridement of the right lower extremity wounds. He was started on Zosyn and vancomycin after wound cultures obtained. Plan to admit to the hospitalist service for further management. Patient was agreeable with this plan. CRITICAL CARE:   None    CONSULTS:  Podiatry  Hospitalist    PROCEDURES:  None    FINAL IMPRESSION      1. Diabetic leg ulcer ([address]          DISPOSITION/PLAN   Admit    PATIENT REFERRED TO:  No follow-up provider specified.     DISCHARGEMEDICATIONS:  New Prescriptions    No medications on file       (Please note that portions of this note were completedwith a voice recognition program.  Efforts were made to edit the dictations but occasionally words are mis-transcribed.)        Shimon Elmore PA-C  07/20/21 6302

## 2021-07-20 NOTE — CONSULTS
Podiatric Surgery Consult    Reason for Consult:  Left foot abscess  Requesting Physician:  Iman Meza MD    CHIEF COMPLAINT:  Left foot pain    HISTORY OF PRESENT ILLNESS:                The patient is a 46 y.o. male with significant past medical history of DM, Seizure disorder, Anemia, Hyperlipidemia, systolic dysfunction who is seen at bedside on behalf of Dr. Hugo Savage. Patient states he began having extreme 10/10 pain to his left heel and ankle 3 days ago. The pain is so severe the patient states he cannot bear weight on the left foot. Patient has been ambulating using a walker. Patient denies trauma to the area. 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 Patient was referred to the [facility] ED by his family doctor. Patient does admit to nausea and diarrhea, but states that is normal for him. He denies Fever, chills, chest pain, or other constitutional symptoms at this time.        Past Medical History:        Diagnosis Date    Abnormal thyroid biopsy     s/p left hemithyroidectomy 2/6412 - follicular adenoma    Acid reflux     Anemia     resolved - previously seen by Dr. Mahad Serrato (due to enlarged spleen)    Anesthesia     seizures with brain surgery due to blood sugar got really high    Bipolar disorder ([facility]     Blood clot in vein 04/07/2016    [facility]) 04/2019    thyroid    Chest pain     previously seeing [facility] cardiologist, now seeing Dr. Jb Scott (LAD bridging on cath 4/2016)    Chronic back pain     Chronic bronchitis ([facility]     Dr. Rosa Edwards Colon polyp 08/30/2016    Depression     seeing Nagi Hendricks DVT (deep venous thrombosis) ([facility] [facility] [id number]    not on Coumadin due to unable to regulate - Dr. Fuentes Soler - no etiology found per patient    Esophageal abnormality     nodule     Fatty liver disease, nonalcoholic     [facility]/S 41/2554 [facility]    History of Doppler ultrasound 05/29/2011    No hemodynamically significant carotid stenosis is identified. A thyroid nodule on each side. Dedicated ultrasound of thyroid gland is suggested to further evaluate if clinically indicated.  History of pulmonary embolism 2007    s/p GFF, related to knee surgery    Hx of blood clots     leg and lung    Hyperlipidemia     severely elevated triglycerides    Hypertension     diastolic    Intracranial arachnoid cyst 11/2012    Dr. Aline Carlos drained, complicated with seizures, DKA    Irritable bowel syndrome     Liver disease     Monica Can - elevated LFT - positive smooth muscle antibody, steatosis per liver bx 3/2014 with Dr. Reed Pickard (multiple drug resistant organisms) resistance 2012    MRSA (methicillin resistant staph aureus) culture positive     h/o in foot and before brain surgery    Nephrolithiasis     noted on CT abdomen 9/2016, 6/2019    Neuropathy     Pancreatic insufficiency     Positive SANDY (antinuclear antibody)     Dr. Naila Dudley - first visit in 6/2013    Prolonged emergence from general anesthesia     Restless legs syndrome     Seizures ([address]     started with brain surgery    Sinus tachycardia     Holter 3/2013 - seeing Dr. Xochitl Mcdowell fracture [facility])     clips     Sleep apnea     positive sleep apnea per study 10/2020.     Systolic dysfunction     \"systolic bridge\"  EF 39% ECHO 9/2019    Type II or unspecified type diabetes mellitus without mention of complication, not stated as uncontrolled 2007     Past Surgical History:        Procedure Laterality Date    CARDIAC CATHETERIZATION      2011,5-6 years ago   [address]  3-2012   [address]  04/28/2016    [facility]     CARPAL TUNNEL RELEASE  01/2017    CHOLECYSTECTOMY  2004    COLONOSCOPY  2012    COLONOSCOPY  08/2016    2 tubular adenomas - reportedly needs repeat scope in 6 months    CRANIOTOMY  11/2012    arachnoid cyst drainage    EKG 12-LEAD BID  levETIRAcetam (KEPPRA) tablet 2,000 mg, 2,000 mg, Oral, BID  metoprolol tartrate (LOPRESSOR) tablet 75 mg, 75 mg, Oral, BID  pantoprazole (PROTONIX) tablet 40 mg, 40 mg, Oral, BID  promethazine (PHENERGAN) tablet 12.5 mg, 12.5 mg, Oral, Q8H PRN  rOPINIRole (REQUIP) tablet 1 mg, 1 mg, Oral, TID  zonisamide (ZONEGRAN) capsule 500 mg, 500 mg, Oral, Nightly  sodium chloride flush 0.9 % injection 5-40 mL, 5-40 mL, Intravenous, 2 times per day  sodium chloride flush 0.9 % injection 5-40 mL, 5-40 mL, Intravenous, PRN  0.9 % sodium chloride infusion, 25 mL, Intravenous, PRN  acetaminophen (TYLENOL) tablet 650 mg, 650 mg, Oral, Q6H PRN **OR** acetaminophen (TYLENOL) suppository 650 mg, 650 mg, Rectal, Q6H PRN  piperacillin-tazobactam (ZOSYN) 3,375 mg in dextrose 5 % 50 mL IVPB extended infusion (mini-bag), 3,375 mg, Intravenous, Q8H  vancomycin (VANCOCIN) 1,750 mg in dextrose 5 % 500 mL IVPB, 15 mg/kg, Intravenous, Q12H  glucose (GLUTOSE) 40 % oral gel 15 g, 15 g, Oral, PRN  dextrose 50 % IV solution, 12.5 g, Intravenous, PRN  glucagon (rDNA) injection 1 mg, 1 mg, Intramuscular, PRN  dextrose 5 % solution, 100 mL/hr, Intravenous, PRN  insulin lispro (HUMALOG) injection vial 0-12 Units, 0-12 Units, Subcutaneous, TID WC  insulin lispro (HUMALOG) injection vial 0-6 Units, 0-6 Units, Subcutaneous, Nightly  lactated ringers infusion, , Intravenous, Continuous  HYDROcodone-acetaminophen (NORCO) 5-325 MG per tablet 1 tablet, 1 tablet, Oral, Q8H PRN  Allergies:  Ciprofloxacin-ciproflox hcl er, Heparin, Metformin, Niacin and related, Tramadol hcl, Ultram [tramadol], and Warfarin    Social History:    TOBACCO:  Never used tobacco  ETOH:  Never drank alcohol  DRUGS:  Never used recreational drugs  Family History:       Problem Relation Age of Onset    Heart Disease Father 61        MI    Stroke Brother     Obesity Brother     Arthritis Mother     Seizures Mother     Obesity Sister     Other Brother         pulmonary embolism    Diabetes Daughter     Seizures Brother      REVIEW OF SYSTEMS:    Review of systems negative except where noted in HPI. PHYSICAL EXAM:      Vitals:    /81   Pulse 99   Temp 97.7 °F (36.5 °C) (Oral)   Resp 16   Ht 6' 2\" (1.88 m)   Wt 245 lb (111.1 kg)   SpO2 98%   BMI 31.46 kg/m²     Exam:        Vascular: Dorsalis pedis and posterior tibial pulses are palpable to right foot, DP is palpable to left. PT biphasic on doppler exam left. Skin temperature is warm  to cool from proximal tibial tuberosity to distal digits. CFT brisk to exposed digits. Edema noted to left heel and ankle, non-pitting. Hair growth present to bilateral feet. Quality of skin is wnl for patient age. Dermatologic: Open lesion x2 noted to anterior aspect of right leg. The proximal lesion measures 2cm in diameter and displays a necrotic base with orange exudate present. The more distal lesion measures 3.5 cm in diameter with a necrotic base, serosanquinous and purulent drainage noted. To both lesions, negative probe to bone, negative malodor. Puncture wound with underlying eschar noted beneath superficial skin to left heel, plantar aspect. Post-debridement: left heel expresses 5cc of purulent drainage, probes to calcaneus. Extensive malodor present. Neurovascular: Light touch sensation grossly absent to bilateral feet, restored at mid calf. Musculoskeletal: Muscle strength 5/5 for all plantarflexors, dorsiflexors, inverters and everters to RLE, 4/5 for all quadrants LLE. Exquisite pain to palpation of left heel, and left ankle to the medial and posterior aspects. Cavus foot type noted bilateral. Semi-rigid contractures of digits 1-5 bilateral noted. No amputations noted bilateral.     RLE        LLE post-debridement        Imaging:  XR FOOT LEFT (MIN 3 VIEWS)    Result Date: 7/20/2021  PROCEDURE: XR FOOT LEFT (MIN 3 VIEWS) CLINICAL INFORMATION: pain COMPARISON: No prior study.  TECHNIQUE: 4 views of the foot were obtained. FINDINGS: No acute fracture or dislocation is seen. There is no soft tissue swelling. There is a moderate-sized plantar calcaneal spur. Plantar calcaneal spur. Otherwise normal left foot. **This report has been created using voice recognition software. It may contain minor errors which are inherent in voice recognition technology. ** Final report electronically signed by Dr. Elana Louis on 7/20/2021 3:57 PM    CT TIBIA FIBULA LEFT WO CONTRAST    Result Date: 7/20/2021  PROCEDURE: NONCONTRAST CT LEFT TIBIA/FIBULA: CLINICAL INFORMATION: Infection/ abscess TECHNIQUE: Multiple axial 3 mm images of the left tibia/fibula were obtained without administration of intravenous contrast material. Computer generated sagittal and coronal images of the left tibia/fibula were also reconstructed. ALL CT SCANS AT THIS FACILITY use dose modulation, iterative reconstruction, and/or weight-based dosing when appropriate to reduce radiation dose to as low as reasonably achievable. FINDINGS: There are bony defects in the distal femur and proximal tibia conjunction with prior anterior crucial ligament repair. No acute fracture is seen. Appears be a small bone cyst in the lateral malleolus. There is mild degenerative spurring of the distal femur. Note that there appear to be multiple superficial varicosities in the distal left calf     1. Degenerative changes of the left knee. Postsurgical changes left knee. Multiple varicosities this left calf. 2. Study otherwise unremarkable. No tibial or fibular fracture is seen. **This report has been created using voice recognition software. It may contain minor errors which are inherent in voice recognition technology. ** Final report electronically signed by Dr. Elana Louis on 7/20/2021 9:07 PM      Debridement:Excisional Debridement    Using #15 blade scalpel, scissors and forceps the wound(s) was/were sharply debrided down through and including the removal of epidermis, dermis, subcutaneous tissue and muscle/fascia. Devitalized Tissue Debrided:  fibrin, biofilm, necrotic/eschar, exudate and callus    Pre Debridement Measurements:  Are located in the Wound Documentation Flow Sheet    Wound #: 1   Post  Debridement Measurements:       Percent of Wound Debrided:  100%    Bleeding:  Estimated amount of blood loss is 5ml. Hemostasis Achieved:  not needed    Procedural Pain:  4  / 10         LABS:    Recent Labs     07/20/21  1640   WBC 9.3   HGB 15.9   HCT 44.6           Recent Labs     07/20/21  1640      K 4.2      CO2 25   BUN 9   CREATININE 0.7        Recent Labs     07/20/21  1640   PROT 6.8      No results for input(s): CKTOTAL, CKMB, CKMBINDEX, TROPONINI in the last 72 hours. Assessment  Principle  1. Abscess, left foot  2. Puncture wound, left foot  3. Infected ulcerations, right leg  4. Cellulitis, right leg        Plan  Patient initially examined and evaluated  Excisional debridement performed to left heel, see procedure above  XR left foot reviewed, impression above  CT left tib/fib reviewed, impression above  A&A cultures harvested from right leg lesions and left heel abscess  Right leg dressed with cuticerin, gauze 4x4's, kerlix, ACE  Left heel dressed with Iodoform packing, gauze 4x4's, kerlix, ACE  Patient to bear partial weight as tolerated to LLE  IV Vancomycin and Zosyn per ED physician   Podiatry will continue to follow patient    DISPO: Podiatry will follow while in house, future surgical planning pending diagnostic imaging and culture results    Dr. Rox Guzman thank you for the consultation, allowing podiatry to assist in the medical welfare of this patient. Podiatry will continue to follow this patient throughout the duration of hospitalization.    Mulugeta Hester  7/20/2021 6:14 PM no

## 2021-07-20 NOTE — PROGRESS NOTES
Pt admitted to  [phone] from ED. Complaints: None. IV site free of s/s of infection or infiltration. Vital signs obtained. Assessment and data collection initiated. Two nurse skin assessment performed by Amanda Greenfield and Alejandro Montalvo RN. Oriented to room. Explained patients right to have family, representative or physician notified of their admission. Patient has Declined for physician to be notified. Patient has Declined for family/representative to be notified. The patient is interested in [facility]. [facility] meds to beds program?:  No    Policies and procedures for [facility] explained. All questions answered with no further questions at this time. Fall prevention and safety brochure discussed with patient. Bed alarm on. Call light in reach.

## 2021-07-21 ENCOUNTER — APPOINTMENT (OUTPATIENT)
Dept: MRI IMAGING | Age: 51
DRG: 623 | End: 2021-07-21
Payer: MEDICARE

## 2021-07-21 LAB
ACINETOBACTER BAUMANNII FILM ARRAY: NOT DETECTED
ANION GAP SERPL CALCULATED.3IONS-SCNC: 13 MEQ/L (ref 8–16)
AVERAGE GLUCOSE: 189 MG/DL (ref 70–126)
BOTTLE TYPE: ABNORMAL
BUN BLDV-MCNC: 8 MG/DL (ref 7–22)
CALCIUM SERPL-MCNC: 8.5 MG/DL (ref 8.5–10.5)
CANDIDA ALBICANS FILM ARRAY: NOT DETECTED
CANDIDA GLABRATA FILM ARRAY: NOT DETECTED
CANDIDA KRUSEI FILM ARRAY: NOT DETECTED
CANDIDA PARAPSILOSIS FILM ARRAY: NOT DETECTED
CANDIDA TROPICALIS FILM ARRAY: NOT DETECTED
CARBAPENEM RESITANT FILM ARRAY: ABNORMAL
CHLORIDE BLD-SCNC: 103 MEQ/L (ref 98–111)
CO2: 25 MEQ/L (ref 23–33)
CREAT SERPL-MCNC: 0.9 MG/DL (ref 0.4–1.2)
ENTERBACTER CLOACAE FILM ARRAY: NOT DETECTED
ENTERBACTERIACEAE FILM ARRAY: NOT DETECTED
ENTEROCOCCUS FILM ARRAY: NOT DETECTED
ERYTHROCYTE [DISTWIDTH] IN BLOOD BY AUTOMATED COUNT: 12.6 % (ref 11.5–14.5)
ERYTHROCYTE [DISTWIDTH] IN BLOOD BY AUTOMATED COUNT: 40.8 FL (ref 35–45)
ESCHERICHIA COLI FILM ARRAY: NOT DETECTED
GFR SERPL CREATININE-BSD FRML MDRD: 89 ML/MIN/1.73M2
GLUCOSE BLD-MCNC: 196 MG/DL (ref 70–108)
GLUCOSE BLD-MCNC: 256 MG/DL (ref 70–108)
GLUCOSE BLD-MCNC: 317 MG/DL (ref 70–108)
GLUCOSE BLD-MCNC: 360 MG/DL (ref 70–108)
GLUCOSE BLD-MCNC: 379 MG/DL (ref 70–108)
HAEMOPHILUS INFLUENZA FILM ARRAY: NOT DETECTED
HBA1C MFR BLD: 8.3 % (ref 4.4–6.4)
HCT VFR BLD CALC: 40.1 % (ref 42–52)
HEMOGLOBIN: 14 GM/DL (ref 14–18)
KLEBSIELLA OXYTOCA FILM ARRAY: NOT DETECTED
KLEBSIELLA PNEUMONIAE FILM ARRAY: NOT DETECTED
LISTERIA MONOCYTOGENES FILM ARRAY: NOT DETECTED
MCH RBC QN AUTO: 30.7 PG (ref 26–33)
MCHC RBC AUTO-ENTMCNC: 34.9 GM/DL (ref 32.2–35.5)
MCV RBC AUTO: 87.9 FL (ref 80–94)
METHICILLIN RESISTANT FILM ARRAY: DETECTED
NEISSERIA MENIGITIDIS FILM ARRAY: NOT DETECTED
PLATELET # BLD: 191 THOU/MM3 (ref 130–400)
PMV BLD AUTO: 10.4 FL (ref 9.4–12.4)
POTASSIUM REFLEX MAGNESIUM: 4.2 MEQ/L (ref 3.5–5.2)
PROTEUS FILM ARRAY: NOT DETECTED
PSEUDOMONAS AERUGINOSA FILM ARRAY: NOT DETECTED
RBC # BLD: 4.56 MILL/MM3 (ref 4.7–6.1)
SERRATIA MARCESCENS FILM ARRAY: NOT DETECTED
SODIUM BLD-SCNC: 141 MEQ/L (ref 135–145)
SOURCE OF BLOOD CULTURE: ABNORMAL
STAPH AUREUS FILM ARRAY: NOT DETECTED
STAPHYLOCOCCUS FILM ARRAY: DETECTED
STREP AGALACTIAE FILM ARRAY: NOT DETECTED
STREP PNEUMONIAE FILM ARRAY: NOT DETECTED
STREP PYOCGENES FILM ARRAY: NOT DETECTED
STREPTOCOCCUS FILM ARRAY: NOT DETECTED
VANCOMYCIN RESISTANT FILM ARRAY: ABNORMAL
WBC # BLD: 8.5 THOU/MM3 (ref 4.8–10.8)

## 2021-07-21 PROCEDURE — 1200000000 HC SEMI PRIVATE

## 2021-07-21 PROCEDURE — 80048 BASIC METABOLIC PNL TOTAL CA: CPT

## 2021-07-21 PROCEDURE — 6360000004 HC RX CONTRAST MEDICATION: Performed by: STUDENT IN AN ORGANIZED HEALTH CARE EDUCATION/TRAINING PROGRAM

## 2021-07-21 PROCEDURE — 6370000000 HC RX 637 (ALT 250 FOR IP)

## 2021-07-21 PROCEDURE — A9579 GAD-BASE MR CONTRAST NOS,1ML: HCPCS | Performed by: STUDENT IN AN ORGANIZED HEALTH CARE EDUCATION/TRAINING PROGRAM

## 2021-07-21 PROCEDURE — 6370000000 HC RX 637 (ALT 250 FOR IP): Performed by: PEDIATRICS

## 2021-07-21 PROCEDURE — 2580000003 HC RX 258: Performed by: INTERNAL MEDICINE

## 2021-07-21 PROCEDURE — 85027 COMPLETE CBC AUTOMATED: CPT

## 2021-07-21 PROCEDURE — 82948 REAGENT STRIP/BLOOD GLUCOSE: CPT

## 2021-07-21 PROCEDURE — 99232 SBSQ HOSP IP/OBS MODERATE 35: CPT | Performed by: PHYSICIAN ASSISTANT

## 2021-07-21 PROCEDURE — 6370000000 HC RX 637 (ALT 250 FOR IP): Performed by: PHYSICIAN ASSISTANT

## 2021-07-21 PROCEDURE — 6360000002 HC RX W HCPCS: Performed by: INTERNAL MEDICINE

## 2021-07-21 PROCEDURE — 73723 MRI JOINT LWR EXTR W/O&W/DYE: CPT

## 2021-07-21 PROCEDURE — 6360000002 HC RX W HCPCS

## 2021-07-21 PROCEDURE — 6370000000 HC RX 637 (ALT 250 FOR IP): Performed by: INTERNAL MEDICINE

## 2021-07-21 PROCEDURE — 36415 COLL VENOUS BLD VENIPUNCTURE: CPT

## 2021-07-21 RX ORDER — LACOSAMIDE 50 MG/1
TABLET ORAL
Status: COMPLETED
Start: 2021-07-21 | End: 2021-07-21

## 2021-07-21 RX ORDER — HYDROCODONE BITARTRATE AND ACETAMINOPHEN 5; 325 MG/1; MG/1
1 TABLET ORAL EVERY 6 HOURS PRN
Status: DISCONTINUED | OUTPATIENT
Start: 2021-07-21 | End: 2021-07-22

## 2021-07-21 RX ORDER — MORPHINE SULFATE 2 MG/ML
INJECTION, SOLUTION INTRAMUSCULAR; INTRAVENOUS
Status: COMPLETED
Start: 2021-07-21 | End: 2021-07-21

## 2021-07-21 RX ORDER — MORPHINE SULFATE 2 MG/ML
2 INJECTION, SOLUTION INTRAMUSCULAR; INTRAVENOUS EVERY 4 HOURS PRN
Status: DISCONTINUED | OUTPATIENT
Start: 2021-07-21 | End: 2021-07-27 | Stop reason: HOSPADM

## 2021-07-21 RX ADMIN — LACOSAMIDE 200 MG: 50 TABLET, FILM COATED ORAL at 20:17

## 2021-07-21 RX ADMIN — MORPHINE SULFATE 2 MG: 2 INJECTION, SOLUTION INTRAMUSCULAR; INTRAVENOUS at 08:55

## 2021-07-21 RX ADMIN — PANTOPRAZOLE SODIUM 40 MG: 40 TABLET, DELAYED RELEASE ORAL at 16:43

## 2021-07-21 RX ADMIN — HYDROCODONE BITARTRATE AND ACETAMINOPHEN 1 TABLET: 5; 325 TABLET ORAL at 06:44

## 2021-07-21 RX ADMIN — INSULIN LISPRO 3 UNITS: 100 INJECTION, SOLUTION INTRAVENOUS; SUBCUTANEOUS at 21:38

## 2021-07-21 RX ADMIN — GABAPENTIN 600 MG: 600 TABLET, FILM COATED ORAL at 08:15

## 2021-07-21 RX ADMIN — GABAPENTIN 600 MG: 600 TABLET, FILM COATED ORAL at 16:43

## 2021-07-21 RX ADMIN — LEVETIRACETAM 2000 MG: 500 TABLET, FILM COATED ORAL at 20:18

## 2021-07-21 RX ADMIN — INSULIN GLARGINE 75 UNITS: 100 INJECTION, SOLUTION SUBCUTANEOUS at 08:38

## 2021-07-21 RX ADMIN — GADOTERIDOL 20 ML: 279.3 INJECTION, SOLUTION INTRAVENOUS at 14:44

## 2021-07-21 RX ADMIN — METOPROLOL TARTRATE 75 MG: 25 TABLET, FILM COATED ORAL at 20:18

## 2021-07-21 RX ADMIN — Medication 1250 MG: at 20:05

## 2021-07-21 RX ADMIN — SODIUM CHLORIDE, POTASSIUM CHLORIDE, SODIUM LACTATE AND CALCIUM CHLORIDE: 600; 310; 30; 20 INJECTION, SOLUTION INTRAVENOUS at 10:51

## 2021-07-21 RX ADMIN — DULOXETINE HYDROCHLORIDE 60 MG: 60 CAPSULE, DELAYED RELEASE ORAL at 08:16

## 2021-07-21 RX ADMIN — PANTOPRAZOLE SODIUM 40 MG: 40 TABLET, DELAYED RELEASE ORAL at 06:44

## 2021-07-21 RX ADMIN — ROPINIROLE HYDROCHLORIDE 1 MG: 1 TABLET, FILM COATED ORAL at 20:18

## 2021-07-21 RX ADMIN — PIPERACILLIN AND TAZOBACTAM 3375 MG: 3; .375 INJECTION, POWDER, LYOPHILIZED, FOR SOLUTION INTRAVENOUS at 08:39

## 2021-07-21 RX ADMIN — HYDROCODONE BITARTRATE AND ACETAMINOPHEN 1 TABLET: 5; 325 TABLET ORAL at 13:25

## 2021-07-21 RX ADMIN — LACOSAMIDE: 50 TABLET, FILM COATED ORAL at 20:30

## 2021-07-21 RX ADMIN — Medication 1250 MG: at 06:45

## 2021-07-21 RX ADMIN — ROPINIROLE HYDROCHLORIDE 1 MG: 1 TABLET, FILM COATED ORAL at 08:15

## 2021-07-21 RX ADMIN — ZONISAMIDE 500 MG: 100 CAPSULE ORAL at 20:18

## 2021-07-21 RX ADMIN — PIPERACILLIN AND TAZOBACTAM 3375 MG: 3; .375 INJECTION, POWDER, LYOPHILIZED, FOR SOLUTION INTRAVENOUS at 16:43

## 2021-07-21 RX ADMIN — HYDROCODONE BITARTRATE AND ACETAMINOPHEN 1 TABLET: 5; 325 TABLET ORAL at 20:09

## 2021-07-21 RX ADMIN — PIPERACILLIN AND TAZOBACTAM 3375 MG: 3; .375 INJECTION, POWDER, LYOPHILIZED, FOR SOLUTION INTRAVENOUS at 01:43

## 2021-07-21 RX ADMIN — GABAPENTIN 600 MG: 600 TABLET, FILM COATED ORAL at 13:25

## 2021-07-21 RX ADMIN — LEVETIRACETAM 2000 MG: 500 TABLET, FILM COATED ORAL at 08:15

## 2021-07-21 RX ADMIN — DIVALPROEX SODIUM 500 MG: 500 TABLET, DELAYED RELEASE ORAL at 20:19

## 2021-07-21 RX ADMIN — ROPINIROLE HYDROCHLORIDE 1 MG: 1 TABLET, FILM COATED ORAL at 13:25

## 2021-07-21 RX ADMIN — PROMETHAZINE HYDROCHLORIDE 12.5 MG: 25 TABLET ORAL at 06:44

## 2021-07-21 RX ADMIN — SODIUM CHLORIDE, POTASSIUM CHLORIDE, SODIUM LACTATE AND CALCIUM CHLORIDE: 600; 310; 30; 20 INJECTION, SOLUTION INTRAVENOUS at 22:52

## 2021-07-21 RX ADMIN — LACOSAMIDE 200 MG: 50 TABLET, FILM COATED ORAL at 08:16

## 2021-07-21 RX ADMIN — INSULIN GLARGINE 75 UNITS: 100 INJECTION, SOLUTION SUBCUTANEOUS at 21:38

## 2021-07-21 RX ADMIN — DIVALPROEX SODIUM 500 MG: 500 TABLET, DELAYED RELEASE ORAL at 08:16

## 2021-07-21 RX ADMIN — GABAPENTIN 600 MG: 600 TABLET, FILM COATED ORAL at 20:19

## 2021-07-21 ASSESSMENT — PAIN DESCRIPTION - PROGRESSION: CLINICAL_PROGRESSION: NOT CHANGED

## 2021-07-21 ASSESSMENT — PAIN SCALES - GENERAL
PAINLEVEL_OUTOF10: 10
PAINLEVEL_OUTOF10: 8
PAINLEVEL_OUTOF10: 10
PAINLEVEL_OUTOF10: 8
PAINLEVEL_OUTOF10: 8
PAINLEVEL_OUTOF10: 9

## 2021-07-21 ASSESSMENT — PAIN DESCRIPTION - PAIN TYPE
TYPE: ACUTE PAIN
TYPE: ACUTE PAIN

## 2021-07-21 ASSESSMENT — PAIN DESCRIPTION - ONSET: ONSET: ON-GOING

## 2021-07-21 ASSESSMENT — PAIN DESCRIPTION - FREQUENCY: FREQUENCY: CONTINUOUS

## 2021-07-21 ASSESSMENT — PAIN DESCRIPTION - DESCRIPTORS: DESCRIPTORS: SHARP;STABBING

## 2021-07-21 ASSESSMENT — PAIN DESCRIPTION - LOCATION
LOCATION: FOOT
LOCATION: FOOT

## 2021-07-21 ASSESSMENT — PAIN DESCRIPTION - ORIENTATION: ORIENTATION: LEFT

## 2021-07-21 ASSESSMENT — PAIN - FUNCTIONAL ASSESSMENT: PAIN_FUNCTIONAL_ASSESSMENT: PREVENTS OR INTERFERES SOME ACTIVE ACTIVITIES AND ADLS

## 2021-07-21 NOTE — PROGRESS NOTES
Podiatric Progress Note  Carina Anguiano  Subjective :   7/21/2021  Patient seen bedside today alongside of Dr. Dejuan Jenkins. Patient appeared pleasant, was oriented to person, place and time. Patient relates that he still has significant pain in his left foot. Pain is to the inside part of his heel. It is extremely painful with manipulation, and dressing changes are very difficult for him. He states that he received norco for pain earlier this morning, but it has not done anything for him as of yet. Patient endorses chills a couple days ago, but denies any nausea, vomiting, fever, chest pain, shortness of breath. Patient has no further pedal concerns at this point in time.      Current Medications:    Current Facility-Administered Medications: HYDROcodone-acetaminophen (NORCO) 5-325 MG per tablet 1 tablet, 1 tablet, Oral, Q6H PRN  insulin lispro (HUMALOG) injection vial 10 Units, 10 Units, Subcutaneous, TID WC  morphine (PF) injection 2 mg, 2 mg, Intravenous, Q4H PRN  bupivacaine (PF) (MARCAINE) 0.5 % injection 150 mg, 30 mL, Intradermal, Once  divalproex (DEPAKOTE) DR tablet 500 mg, 500 mg, Oral, BID  DULoxetine (CYMBALTA) extended release capsule 60 mg, 60 mg, Oral, Daily  empagliflozin (JARDIANCE) tablet TABS 25 mg, 25 mg, Oral, Daily  gabapentin (NEURONTIN) tablet 600 mg, 600 mg, Oral, 4x Daily  insulin glargine (LANTUS) injection vial 75 Units, 75 Units, Subcutaneous, BID  lacosamide (VIMPAT) tablet 200 mg, 200 mg, Oral, BID  levETIRAcetam (KEPPRA) tablet 2,000 mg, 2,000 mg, Oral, BID  metoprolol tartrate (LOPRESSOR) tablet 75 mg, 75 mg, Oral, BID  pantoprazole (PROTONIX) tablet 40 mg, 40 mg, Oral, BID  promethazine (PHENERGAN) tablet 12.5 mg, 12.5 mg, Oral, Q8H PRN  rOPINIRole (REQUIP) tablet 1 mg, 1 mg, Oral, TID  zonisamide (ZONEGRAN) capsule 500 mg, 500 mg, Oral, Nightly  sodium chloride flush 0.9 % injection 5-40 mL, 5-40 mL, Intravenous, 2 times per day  sodium chloride flush 0.9 % injection 5-40 mL, 5-40 mL, Intravenous, PRN  0.9 % sodium chloride infusion, 25 mL, Intravenous, PRN  acetaminophen (TYLENOL) tablet 650 mg, 650 mg, Oral, Q6H PRN **OR** acetaminophen (TYLENOL) suppository 650 mg, 650 mg, Rectal, Q6H PRN  piperacillin-tazobactam (ZOSYN) 3,375 mg in dextrose 5 % 50 mL IVPB extended infusion (mini-bag), 3,375 mg, Intravenous, Q8H  glucose (GLUTOSE) 40 % oral gel 15 g, 15 g, Oral, PRN  dextrose 50 % IV solution, 12.5 g, Intravenous, PRN  glucagon (rDNA) injection 1 mg, 1 mg, Intramuscular, PRN  dextrose 5 % solution, 100 mL/hr, Intravenous, PRN  insulin lispro (HUMALOG) injection vial 0-12 Units, 0-12 Units, Subcutaneous, TID WC  insulin lispro (HUMALOG) injection vial 0-6 Units, 0-6 Units, Subcutaneous, Nightly  lactated ringers infusion, , Intravenous, Continuous  vancomycin (VANCOCIN) intermittent dosing (placeholder), , Other, RX Placeholder  vancomycin (VANCOCIN) 1250 mg in dextrose 5 % 250 mL IVPB, 1,250 mg, Intravenous, Q12H    Objective     /69   Pulse 73   Temp 98.9 °F (37.2 °C) (Oral)   Resp 18   Ht 6' 2\" (1.88 m)   Wt 244 lb 1.6 oz (110.7 kg)   SpO2 94%   BMI 31.34 kg/m²      I/O:    Intake/Output Summary (Last 24 hours) at 7/21/2021 1034  Last data filed at 7/21/2021 0453  Gross per 24 hour   Intake 1303.68 ml   Output 300 ml   Net 1003.68 ml              Wt Readings from Last 3 Encounters:   07/21/21 244 lb 1.6 oz (110.7 kg)   06/28/21 254 lb (115.2 kg)   04/13/21 249 lb 3.2 oz (113 kg)       LABS:    Recent Labs     07/20/21  1640 07/21/21  0602   WBC 9.3 8.5   HGB 15.9 14.0   HCT 44.6 40.1*    191        Recent Labs     07/21/21  0602      K 4.2      CO2 25   BUN 8   CREATININE 0.9        Recent Labs     07/20/21  1640   PROT 6.8      No results for input(s): CKTOTAL, CKMB, CKMBINDEX, TROPONINI in the last 72 hours. Exam:   Dressing intact and well maintained. No apparent strike through noted.      Vascular: Dorsalis pedis and posterior tibial pulses are palpable to right foot, DP is palpable to left. PT biphasic on doppler exam left. Skin temperature is warm  to cool from proximal tibial tuberosity to distal digits. CFT brisk to exposed digits. Edema noted to left heel and ankle, non-pitting. Hair growth present to bilateral feet. Quality of skin is wnl for patient age. Dermatologic: Open lesion x2 noted to anterior aspect of right leg. The proximal lesion measures 2cm in diameter and displays a fibro-necrotic base with orange exudate present. The more distal lesion measures 3.5 cm in diameter with a necrotic base, serosanquinous and purulent drainage noted. To both lesions, negative probe to bone, negative malodor. On left, patient has open wound to heel. Sanguinous discharge present. Some karin-wound erythema present extending dorsally and medially. Wound probes to bone, does not track to medial foot. Neurovascular: Light touch sensation grossly absent to bilateral feet, restored at mid calf. Musculoskeletal: Muscle strength 5/5 for all plantarflexors, dorsiflexors, inverters and everters to RLE, 4/5 for all quadrants LLE. Exquisite pain to palpation of left heel, and left ankle to the medial and posterior aspects. Cavus foot type noted bilateral. Semi-rigid contractures of digits 1-5 bilateral noted. No amputations noted bilateral.     Left heel      Right anterior leg      XR FOOT LEFT (MIN 3 VIEWS)    Result Date: 7/20/2021  PROCEDURE: XR FOOT LEFT (MIN 3 VIEWS) CLINICAL INFORMATION: pain COMPARISON: No prior study. TECHNIQUE: 4 views of the foot were obtained. FINDINGS: No acute fracture or dislocation is seen. There is no soft tissue swelling. There is a moderate-sized plantar calcaneal spur. Plantar calcaneal spur. Otherwise normal left foot. **This report has been created using voice recognition software. It may contain minor errors which are inherent in voice recognition technology. ** Final report electronically signed by Dr. Siri Grajeda Tiffanie on 7/20/2021 3:57 PM    CT TIBIA FIBULA LEFT WO CONTRAST    Result Date: 7/20/2021  PROCEDURE: NONCONTRAST CT LEFT TIBIA/FIBULA: CLINICAL INFORMATION: Infection/ abscess TECHNIQUE: Multiple axial 3 mm images of the left tibia/fibula were obtained without administration of intravenous contrast material. Computer generated sagittal and coronal images of the left tibia/fibula were also reconstructed. ALL CT SCANS AT THIS FACILITY use dose modulation, iterative reconstruction, and/or weight-based dosing when appropriate to reduce radiation dose to as low as reasonably achievable. FINDINGS: There are bony defects in the distal femur and proximal tibia conjunction with prior anterior crucial ligament repair. No acute fracture is seen. Appears be a small bone cyst in the lateral malleolus. There is mild degenerative spurring of the distal femur. Note that there appear to be multiple superficial varicosities in the distal left calf     1. Degenerative changes of the left knee. Postsurgical changes left knee. Multiple varicosities this left calf. 2. Study otherwise unremarkable. No tibial or fibular fracture is seen. **This report has been created using voice recognition software. It may contain minor errors which are inherent in voice recognition technology. ** Final report electronically signed by Dr. Kira Westbrook on 7/20/2021 9:07 PM      ASSESSMENT  Principle  1. Abscess, left foot  2. Puncture wound, left foot  3. Ulcerations, right leg  4.  Cellulitis, right proximal leg and left heel    Chronic  Patient Active Problem List   Diagnosis    Depression    Liver disease    Restless legs syndrome    Chronic back pain    Other chest pain    Irritable bowel syndrome    Non compliance w medication regimen    Hypertension associated with diabetes ([address]    Hyperlipidemia with target LDL less than 100    IBS (irritable bowel syndrome)    Sinus tachycardia    Confusion    Encephalopathy    Diabetes mellitus type II, uncontrolled ([address]    Dizziness    Neuropathy    Hyperammonemia (HCC)    Medication overdose, insulin    Suicide attempt ([address]    Hypoglycemia    Hypokalemia    Lithium toxicity    Nausea & vomiting    Snoring    Hypogonadism    Erectile dysfunction due to diseases classified elsewhere    Rash    Headaches due to old head injury    Abdominal wall pain in left lower quadrant    Bipolar affective disorder (HCC)    Hypogonadism in male    Type 2 diabetes mellitus with diabetic neuropathy (HCC)    Hypomagnesemia    Partial seizure ([address]    Syncope and collapse    Headache disorder    Partial symptomatic epilepsy with complex partial seizures, not intractable, without status epilepticus ([address]    Respiratory acidosis    Essential hypertension    Gastritis    Gastroesophageal reflux disease    Mixed hyperlipidemia    Acute lateral meniscus tear of right knee    Recurrent right inguinal hernia    Bicipital tendinitis of right shoulder    Carpal tunnel syndrome of right wrist    Obesity (BMI 30-39. 9)    Lump of axilla    Familial hypercholesterolemia    Coronary artery disease of native artery of native heart with stable angina pectoris (HCC)    Exertional dyspnea    Vitamin D deficiency    Seizures (HCC)    Nausea and vomiting    Type II diabetes mellitus with manifestations, uncontrolled (HCC)    Chondromalacia of knee, right    Effusion of left knee    Elevated levels of transaminase & lactic acid dehydrogenase    Other intervertebral disc degeneration, lumbar region    Palpitations    Splenomegaly    Sprain of right foot    Steatohepatitis    Osteopenia    Obstructive sleep apnea    Follicular neoplasm of thyroid    Nasal obstruction    Nasal septal deviation    Chest pain    MISTI (obstructive sleep apnea)    Acute post-operative pain    Bipolar 1 disorder, depressed ([address]    Status post uvulopalatopharyngoplasty    Status post partial thyroidectomy    Wound infection       PLAN:   - Pt. is a 46 y.o. male   - Patient was examined and evaluated  - WBC 8.5, afebrile  - Labs reviewed, creatinine 0.9  - Blood cultures preliminarily negative x2  - Cultures yield: gram positive cocci singly and in pairs  - X-rays and CT reviewed, see reports above  - Continue IV Antibiotics vancomycin and zosyn, pending input from infectious disease  - Dressings changed, right leg: adaptic, gauze, kerlix, and ACE bandage to RLE, mesalt packing, gauze, kerlix, ACE to LLE  - 2mg morphine PRN prescribed for pain with dressing changes  - Podiatry will change dressings daily  - Disucssed with patient that as xrays are negative, further imaging is required to evaluate either for deep abscess or OM of calcaneus. Ordered MRI left ankle to visualize calcaneus.   - Nonweightbearing to the to the left foot. WBAT to RLE  - Podiatry will continue to follow    DISPO: Pending response to IV abx, MRI left ankle    Raj Ortega D.P.M. Podiatric Surgical Resident  7/21/2021   10:34 AM      I have inpendently examined the patient, above information and plan discussed with the patient. Labs, cultures, and x-rays were reviewed. I agree with the plan. All questions answered.

## 2021-07-21 NOTE — CONSULTS
CONSULTATION NOTE :ID       Patient - Susana Ashley,  Age - 46 y.o.    - 1970      Room Number - 7K-19/019-A   MRN -  [id number]   Acct # - [de-identified]  Date of Admission -  2021  3:31 PM  Patient's PCP: Particia Ma MD     Requesting Physician: Saintclair Lineman, PA    REASON FOR CONSULTATION   Soft tissue infection  CHIEF COMPLAINT   Left foot pain    HISTORY OF PRESENT ILLNESS       This is a very pleasant 46 y.o. male who was admitted to the hospital with a chief complaints of throbbing pain on his left foot. He had history of a wound on his foot and lower leg started as a boil and continued to get worse had similar history before there was purulent drainage from the right lower leg wound he had at bedside incision and drainage of his left heel. Denies any fever or chills  He had been using Hibiclens soap at home that he had history of similar infections in the past he was started on broad-spectrum antibiotics. He was evaluated by podiatry. He had MRI which showed increased activity in the soft tissue.   No obvious bone erosion was noted he had history of MRSA infection  PAST MEDICAL  HISTORY       Past Medical History:   Diagnosis Date    Abnormal thyroid biopsy     s/p left hemithyroidectomy 3170 - follicular adenoma    Acid reflux     Anemia     resolved - previously seen by Dr. Tricia Thomas (due to enlarged spleen)    Anesthesia     seizures with brain surgery due to blood sugar got really high    B[facility] disorder ([facility]     Blood clot in vein 2016    [facility]     Cancer [facility]) 2019    thyroid    Chest pain     previously seeing [facility] cardiologist, now seeing Dr. Charles Matos (LAD bridging on cath 2016)    Chronic back pain     Chronic bronchitis ([facility]     Dr. Giovanni Rosado Colon polyp 2016    Depression     seeing Mauricio Figueroa DVT (deep venous thrombosis) ([facility] [id number]    not on Coumadin due to unable to regulate - Dr. Clement Session - no etiology found per patient    Esophageal abnormality     nodule     Fatty liver disease, nonalcoholic     [facility]S 06/[facility]    Furuncle     legs    History of Doppler ultrasound 05/29/2011    No hemodynamically significant carotid stenosis is identified. A thyroid nodule on each side. Dedicated ultrasound of thyroid gland is suggested to further evaluate if clinically indicated.  History of pulmonary embolism 2007    s/p GFF, related to knee surgery    Hx of blood clots     leg and lung    Hyperlipidemia     severely elevated triglycerides    Hypertension     diastolic    Intracranial arachnoid cyst 11/2012    Dr. Contreras Warren drained, complicated with seizures, DKA    Irritable bowel syndrome     Liver disease     Francisco Javier Deem - elevated LFT - positive smooth muscle antibody, steatosis per liver bx 3/2014 with Dr. Theodore Damon (multiple drug resistant organisms) resistance 2012    MRSA (methicillin resistant staph aureus) culture positive     h/o in foot and before brain surgery    Nephrolithiasis     noted on CT abdomen 9/2016, 6/2019    Neuropathy     Pancreatic insufficiency     Positive SANDY (antinuclear antibody)     Dr. Ankit Man - first visit in 6/2013    Prolonged emergence from general anesthesia     Restless legs syndrome     Seizures ([address]     started with brain surgery    Sinus tachycardia     Holter 3/2013 - seeing Dr. Susannah Dunaway fracture [facility])     clips     Sleep apnea     positive sleep apnea per study 10/2020.     Systolic dysfunction     \"systolic bridge\"  EF 62% ECHO 9/2019    Type II or unspecified type diabetes mellitus without mention of complication, not stated as uncontrolled 2007       PAST SURGICAL HISTORY     Past Surgical History:   Procedure Laterality Date    CARDIAC CATHETERIZATION      2011,5-6 years ago   [address]  3-2012   [address]  04/28/2016    [facility]     CARPAL TUNNEL RELEASE  01/2017    CHOLECYSTECTOMY  2004    COLONOSCOPY 2012    COLONOSCOPY  08/2016    2 tubular adenomas - reportedly needs repeat scope in 6 months    CRANIOTOMY  11/2012    arachnoid cyst drainage    EKG 12-LEAD  10/18/2015         ENDOSCOPY, COLON, DIAGNOSTIC      HERNIA REPAIR Right 1996    [facility]--Dr. Evelina Marcial INGUINAL HERNIA REPAIR Right 09/15/2016    Robotic assisted    KNEE ARTHROSCOPY Right 2016    KNEE SURGERY Left 2007    acl and debrided twice    OTHER SURGICAL HISTORY  5-31-11    Tilt table was associated w/ nonspecific symptoms or nausea, otherwise no significant dizziness or syncope. No significant hemodynamic changes. Otherwise, unremarkable tilt table test after 30 minutes of tilting.  OTHER SURGICAL HISTORY  01/20/2017    RIGHT SHOULDER ARTHROSCOPY, OPEN STAN, OPEN ACROMIOPLASTY, BICEP TENDESIS, RIGHT CARPAL TUNNEL RELEASE    SHOULDER SURGERY Right 01/2007    THYROID LOBECTOMY N/A 1/15/2021    THYROID LOBECTOMY, UVULOPALATOPHARYNGOPLAST LAOP performed by Harinder Caputo MD at [facility] ECHOCARDIOGRAM  3-05-11    LV size and systolic function normal. EF 55-65%.      UPPER GASTROINTESTINAL ENDOSCOPY  2012    UPPER GASTROINTESTINAL ENDOSCOPY  2016    Dr. Allison Madrid Left 10/22/2019    EGD DILATION SAVORY performed by Avery Shine MD at [address] [facility] ENDOSCOPY Left 10/22/2019    EGD BIOPSY performed by Avery Shine MD at [address]  2007         MEDICATIONS:       Scheduled Meds:   insulin lispro  10 Units Subcutaneous TID WC    bupivacaine (PF)  30 mL Intradermal Once    divalproex  500 mg Oral BID    DULoxetine  60 mg Oral Daily    empagliflozin  25 mg Oral Daily    gabapentin  600 mg Oral 4x Daily    insulin glargine  75 Units Subcutaneous BID    lacosamide  200 mg Oral BID    levETIRAcetam  2,000 mg Oral BID    metoprolol tartrate  75 mg Oral BID    pantoprazole  40 mg Oral BID    rOPINIRole  1 mg Oral TID    zonisamide  500 mg Oral Nightly    sodium chloride flush  5-40 mL Intravenous 2 times per day    piperacillin-tazobactam  3,375 mg Intravenous Q8H    insulin lispro  0-12 Units Subcutaneous TID WC    insulin lispro  0-6 Units Subcutaneous Nightly    vancomycin (VANCOCIN) intermittent dosing (placeholder)   Other RX Placeholder    vancomycin  1,250 mg Intravenous Q12H     Continuous Infusions:   sodium chloride 25 mL (07/20/21 1936)    dextrose      lactated ringers 125 mL/hr at 07/21/21 1051     PRN Meds:HYDROcodone-acetaminophen, morphine, promethazine, sodium chloride flush, sodium chloride, acetaminophen **OR** acetaminophen, glucose, dextrose, glucagon (rDNA), dextrose  Allergies:   ALLERGIES:    Ciprofloxacin-ciproflox hcl er, Heparin, Metformin, Niacin and related, Tramadol hcl, Ultram [tramadol], and Warfarin        SOCIAL HISTORY:     TOBACCO:   reports that he has never smoked. He has never used smokeless tobacco.     ETOH:   reports no history of alcohol use. Patient currently lives with family       FAMILY HISTORY:         Problem Relation Age of Onset    Heart Disease Father 61        MI    Stroke Brother     Obesity Brother     Arthritis Mother     Seizures Mother     Obesity Sister     Other Brother         pulmonary embolism    Diabetes Daughter     Seizures Brother        REVIEW OF SYSTEMS:     Constitutional: no fever, no night sweats, no fatigue, no weight loss. Head: no head ache , no head injury, no migranes. Eye: no eye discharge, blurring of vision, no double vision,no eye pain. Ears: no hearing difficulty, no tinnitus  Mouth/throat: no ulceration, dental caries , dysphagia, no hoarseness and voice change  Respiratory: no cough no chest pain,no shortness of breath,no wheezing  CVS: no palpitation, no chest pain,   GI: no abdominal pain, no nausea , no vomiting, no constipation,no diarrhea.   CADENCE: no dysuria, frequency and urgency, no hematuria, no kidney stones  Musculoskeletal: no joint pain, swelling , stiffness,  Endocrine: no polyuria, polydipsia, no cold or heat intolerance  Hematology: no anemia, no easy brusing or bleeding, no hx of clotting disorder  Dermatology: As noted in HPI. Neurological:no headaches,no dizziness, no seizure, no numbness. Psychiatry: no depression, no anxiety,no panic attacks, no suicide ideation    PHYSICAL EXAM:     /73   Pulse 84   Temp 98.9 °F (37.2 °C) (Oral)   Resp 16   Ht 6' 2\" (1.88 m)   Wt 244 lb 1.6 oz (110.7 kg)   SpO2 94%   BMI 31.34 kg/m²   General apperance:  Awake, alert, not in distress. HEENT: pink conjunctiva, unicteric sclera, moist oral mucosa. Chest: Bilateral air entry. Cardiovascular:  RRR ,S1S2, no murmur or gallop. Abdomen:  Soft, non tender to palpation. Extremities: Has punched out lesions on his right lower leg open wound on his left heel post incision and drainage  Skin:  Warm and dry. CNS: Oriented to person place and time              LABS:     CBC:   Recent Labs     07/20/21  1640 07/21/21  0602   WBC 9.3 8.5   HGB 15.9 14.0    191     BMP:    Recent Labs     07/20/21  1640 07/21/21  0602    141   K 4.2 4.2    103   CO2 25 25   BUN 9 8   CREATININE 0.7 0.9   GLUCOSE 400* 379*     Calcium:  Recent Labs     07/21/21  0602   CALCIUM 8.5   Glucose:  Recent Labs     07/21/21  0608 07/21/21  1115 07/21/21  1552   POCGLU 360* 317* 196*     HgbA1C:   Recent Labs     07/20/21  1630   LABA1C 8.3*     INR: No results for input(s): INR in the last 72 hours.   Hepatic:   Recent Labs     07/20/21  1640   ALKPHOS 107   ALT 16   AST 12   PROT 6.8   BILITOT 0.9   LABALBU 4.3     Problem list of patient      Patient Active Problem List   Diagnosis Code    Depression F32.9    Liver disease K76.9    Restless legs syndrome G25.81    Chronic back pain M54.9, G89.29    Other chest pain R07.89    Irritable bowel syndrome K58.9    Non compliance w medication regimen Z91.14  Effusion of left knee M25.462    Elevated levels of transaminase & lactic acid dehydrogenase R74.01, R74.02    Other intervertebral disc degeneration, lumbar region M51.36    Palpitations R00.2    Splenomegaly R16.1    Sprain of right foot S93.601A    Steatohepatitis K75.81    Osteopenia M85.80    Obstructive sleep apnea B95.45    Follicular neoplasm of thyroid 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 8XXA, L08.9           Impression and Recommendation:   Soft tissue infection on the right leg and left heel. He had incision and drainage of left heel  Gram stain showed gram-positive cocci in clusters  Positive blood culture likely a fire identified as MRSE continue final culture report and will continue IV vancomycin, will stop IV Zosyn. Infection Control:   Contact isolation    Thank you ASHLEY Perrin for allowing me to participate in this patient's care.     Jonny Acosta MD, MD,FACP 7/21/2021 6:02 PM

## 2021-07-21 NOTE — PROGRESS NOTES
Hospitalist Progress Note      Patient:  Brittanie Diaz    Unit/Bed:7K-19/019-A  YOB: 1970  MRN: [id number]   Acct: [de-identified]   PCP: Jeanie Wilson MD  Date of Admission: 7/20/2021    Assessment/Plan:    1. Multiple Wounds: Cellulitis with Abscess, Left heel & Diabetic Ulcers, RLE: Podiatry & ID consulted. Continue IV ABX. Following cultures, blood cultures are NGTD. Pt is afebrile and no leukocytosis. Norco changed to Q6H from [facility] for better pain control. 2. IDDM, uncontrolled: HgbA1c 8.3, BS 300s. Insulin regimen: Lantus 75 units BID + Humalog 10 units + SSI. Humalog 10 was not restarted on admission, will continue to monitor with this addition. 3. Seizure DO: Continue home medications. 4. Essential HTN: BP is stable. Continue home medications. 5. HLD: Statin. Chief Complaint: Leg Wounds     Initial H and P:-    Initial H&P \"Patient was sent here after a appointment with his primary care physician. He has been having issues with nonhealing lower extremity wounds. He has been trying to keep the wounds clean with soap and water Hibiclens and an biotic ointment. However the wounds are getting worse. Has a history of MRSA as well. He also has noted that his blood sugars have been significantly elevated as of late. He states he also has not ate or drink very well recently. He is brought to the ER and seen by podiatry who performed some debridement and otherwise he is being admitted for IV antibiotics for the treatment of these wounds. \"     Subjective (past 24 hours):   No acute events overnight. Pt states that his pain is not controlled at all. Podiatry consulted and awaiting surgical decision. ID consulted today. Past medical history, family history, social history and allergies reviewed again and is unchanged since admission. ROS Pt denies CP, SOB, HA, abd pain, diarrhea. Pt admits to leg pain.      Medications: Reviewed    Infusion Medications    sodium chloride 25 mL (07/20/21 1936)    dextrose      lactated ringers 125 mL/hr at 07/20/21 2206     Scheduled Medications    bupivacaine (PF)  30 mL Intradermal Once    divalproex  500 mg Oral BID    DULoxetine  60 mg Oral Daily    empagliflozin  25 mg Oral Daily    gabapentin  600 mg Oral 4x Daily    insulin glargine  75 Units Subcutaneous BID    lacosamide  200 mg Oral BID    levETIRAcetam  2,000 mg Oral BID    metoprolol tartrate  75 mg Oral BID    pantoprazole  40 mg Oral BID    rOPINIRole  1 mg Oral TID    zonisamide  500 mg Oral Nightly    sodium chloride flush  5-40 mL Intravenous 2 times per day    piperacillin-tazobactam  3,375 mg Intravenous Q8H    insulin lispro  0-12 Units Subcutaneous TID WC    insulin lispro  0-6 Units Subcutaneous Nightly    vancomycin (VANCOCIN) intermittent dosing (placeholder)   Other RX Placeholder    vancomycin  1,250 mg Intravenous Q12H     PRN Meds: HYDROcodone-acetaminophen, promethazine, sodium chloride flush, sodium chloride, acetaminophen **OR** acetaminophen, glucose, dextrose, glucagon (rDNA), dextrose      Intake/Output Summary (Last 24 hours) at 7/21/2021 0826  Last data filed at 7/21/2021 0453  Gross per 24 hour   Intake 1303.68 ml   Output 300 ml   Net 1003.68 ml       Diet:  ADULT DIET; Regular; 3 carb choices (45 gm/meal)    Exam:  /69   Pulse 73   Temp 98.9 °F (37.2 °C) (Oral)   Resp 18   Ht 6' 2\" (1.88 m)   Wt 244 lb 1.6 oz (110.7 kg)   SpO2 94%   BMI 31.34 kg/m²   General appearance: No apparent distress, appears stated age and cooperative. HEENT: Pupils equal, round, and reactive to light. Conjunctivae/corneas clear. Neck: Supple, with full range of motion. No jugular venous distention. Trachea midline. Respiratory:  Normal respiratory effort on RA. Clear to auscultation, bilaterally without Rales/Wheezes/Rhonchi.   Cardiovascular: Regular rate and rhythm with normal S1/S2 without LEFT (MIN 3 VIEWS)   Final Result   Plantar calcaneal spur. Otherwise normal left foot. **This report has been created using voice recognition software. It may contain minor errors which are inherent in voice recognition technology. **      Final report electronically signed by Dr. Anay Mcguire on 7/20/2021 3:57 PM        Electronically signed by ASHLEY Cuevas on 7/21/2021 at 8:26 AM

## 2021-07-21 NOTE — CARE COORDINATION
7/21/21, 7:45 AM EDT  DISCHARGE PLANNING EVALUATION:    Paige Oviedo       Admitted: 7/20/2021/ [facility] day: 1   Location: 719/019-A Reason for admit: Wound infection [T14. 8XXA, L08.9]   PMH:  has a past medical history of Abnormal thyroid biopsy, Acid reflux, Anemia, Anesthesia, Bipolar disorder ([address] Blood clot in vein, Cancer ([address] Chest pain, Chronic back pain, Chronic bronchitis ([address] Colon polyp, Depression, DVT (deep venous thrombosis) ([address] Esophageal abnormality, Fatty liver disease, nonalcoholic, Furuncle, History of Doppler ultrasound, History of pulmonary embolism, Hx of blood clots, Hyperlipidemia, Hypertension, Intracranial arachnoid cyst, Irritable bowel syndrome, Liver disease, MDRO (multiple drug resistant organisms) resistance, MRSA (methicillin resistant staph aureus) culture positive, Nephrolithiasis, Neuropathy, Pancreatic insufficiency, Positive SANDY (antinuclear antibody), Prolonged emergence from general anesthesia, Restless legs syndrome, Seizures ([address] Sinus tachycardia, Skull fracture ([address] Sleep apnea, Systolic dysfunction, and Type II or unspecified type diabetes mellitus without mention of complication, not stated as uncontrolled. Procedure: na  To ED with severe pain 10/10 left heel, hx diabetic, abdominal pain, hx seizure disorder on Vimpat  Barriers to Discharge:  Podiatry consulted, hospitalist primary, N/V checks, diabetic mgmt, ID consulted with patient currently on IV Vancomycin and Zosyn, follow blood cultures, dressing care. Pain control. Tmax 99.1. PCP: Felipe Beard MD  Readmission Risk Score: 13%    Patient Goals/Plan/Treatment Preferences: Met with Kristal Garcia earlier today; he resides home with his wife, daughters, and three grand kids. He has cane, RW, and was driving pta. He has PCP, is able to afford meds, and shares podiatry may be doing another surgery on patient either at [facility] or in surgery. The decision will be made post MRI of left ankle today. He is unsure at this time if he will need a  nurse/agency. Will follow. Transportation/Food Security/Housekeeping Addressed:  No issues identified.

## 2021-07-21 NOTE — PROGRESS NOTES
Pharmacy Note  [employer] Result    Gautam Rodriguez is a 46 y.o. male, with a positive blood culture result    [employer] message received from Davey , laboratory employee on 7/21/2021 at 4:54 PM    First Gram stain result: gram positive cocci in clusters    BioFire BCID result: Staphylococcus (not aureus), mecA detected    [employer] BCID and gram stain congruent? Yes    Suspected source? RLE wound coag-positive Staph    Patient chart reviewed for signs/symptoms of infection: Yes  /73   Pulse 84   Temp 98.9 °F (37.2 °C) (Oral)   Resp 16   Ht 6' 2\" (1.88 m)   Wt 244 lb 1.6 oz (110.7 kg)   SpO2 94%   BMI 31.34 kg/m²   Lab Results   Component Value Date    WBC 8.5 07/21/2021       Allergies reviewed  Ciprofloxacin-ciproflox hcl er, Heparin, Metformin, Niacin and related, Tramadol hcl, Ultram [tramadol], and Warfarin    Renal function reviewed  Estimated Creatinine Clearance: 129 mL/min (based on SCr of 0.9 mg/dL). Current antibiotic regimen: Vancomycin & Zosyn    Intervention needed: No    Individual contacted: Nurse Derek Griffin     Recommendations: Continue vancomycin    Recommendations accepted?  N/A     CHARLA Edwards [facility]  7/21/2021 4:54 PM

## 2021-07-21 NOTE — H&P
Assessment and Plan:        Bilateral lower extremity ulcers likely consistent with diabetic ulcers: Status post debridement in the ER.  -Follow cultures  -Continue vancomycin and Zosyn for now  -Continue consultation with podiatry  -Work on glycemic control  -Continue gabapentin    Type 2 diabetes on insulin: Patient previously had fairly well-controlled diabetes. Came in with blood sugars in the 400 range. Will begin IV fluids and continue home regimen as well as sliding scale. Continue carb controlled diet as well. Make adjustments as needed. History of epilepsy and possible PNES: As noted in his epilepsy monitoring unit noting more on several months. Continue home AEDs which is several different medications. Hypertension: Continue home medications    Hyperlipidemia: Continue home medications    CC: Leg wounds  HPI: (12 point review of systems completed. Pertinent positives noted. Otherwise ROS is negative) : Patient was sent here after a appointment with his primary care physician. He has been having issues with nonhealing lower extremity wounds. He has been trying to keep the wounds clean with soap and water Hibiclens and an biotic ointment. However the wounds are getting worse. Has a history of MRSA as well. He also has noted that his blood sugars have been significantly elevated as of late. He states he also has not ate or drink very well recently.   He is brought to the ER and seen by podiatry who performed some debridement and otherwise he is being admitted for IV antibiotics for the treatment of these wounds  PMH:  Per HPI  SHX:    Social History     Tobacco Use    Smoking status: Never Smoker    Smokeless tobacco: Never Used   Vaping Use    Vaping Use: Never used   Substance Use Topics    Alcohol use: No     Alcohol/week: 0.0 standard drinks    Drug use: No     FHX:   Family History   Problem Relation Age of Onset    Heart Disease Father 61        MI    Stroke Brother     Obesity Brother     Arthritis Mother     Seizures Mother     Obesity Sister     Other Brother         pulmonary embolism    Diabetes Daughter     Seizures Brother      Allergies:    Allergies   Allergen Reactions    Ciprofloxacin-Ciproflox Hcl Er Other (See Comments)     Elevated creatinine    Heparin      Monitor for thrombocytopenia    Metformin Nausea Only     Abdominal pain, diarrhea    Niacin And Related Other (See Comments)     Burning sensation, severe flushing    Tramadol Hcl      Contraindication to seizure medications    Ultram [Tramadol]      Contraindication to seizure medications    Warfarin      Very difficult to regulate in past     Medications:     sodium chloride 25 mL (07/20/21 1936)    dextrose      lactated ringers        bupivacaine (PF)  30 mL Intradermal Once    divalproex  500 mg Oral BID    DULoxetine  60 mg Oral Daily    empagliflozin  25 mg Oral Daily    gabapentin  600 mg Oral 4x Daily    insulin glargine  75 Units Subcutaneous BID    lacosamide  200 mg Oral BID    levETIRAcetam  2,000 mg Oral BID    metoprolol tartrate  75 mg Oral BID    pantoprazole  40 mg Oral BID    rOPINIRole  1 mg Oral TID    zonisamide  500 mg Oral Nightly    sodium chloride flush  5-40 mL Intravenous 2 times per day    [START ON 7/21/2021] piperacillin-tazobactam  3,375 mg Intravenous Q8H    [START ON 7/21/2021] insulin lispro  0-12 Units Subcutaneous TID WC    insulin lispro  0-6 Units Subcutaneous Nightly    vancomycin (VANCOCIN) intermittent dosing (placeholder)   Other RX Placeholder    [START ON 7/21/2021] vancomycin  1,250 mg Intravenous Q12H       Vital Signs:   BP (!) 144/82   Pulse 83   Temp 97.9 °F (36.6 °C) (Oral)   Resp 18   Ht 6' 2\" (1.88 m)   Wt 245 lb (111.1 kg)   SpO2 95%   BMI 31.46 kg/m²      Intake/Output Summary (Last 24 hours) at 7/20/2021 2155  Last data filed at 7/20/2021 2139  Gross per 24 hour   Intake 126 ml   Output --   Net 126 ml        General: Resting in bed  HEENT:  normocephalic and atraumatic. No scleral icterus. PERR. Neck: supple. No JVD. No thyromegaly. Lungs: clear to auscultation. No retractions  Cardiac: RRR without murmur. Abdomen: soft. Nontender. Bowel sounds positive. Extremities:  Well wrapped. No extending erythema. Vasculature: capillary refill < 3 seconds. Palpable LE pulses bilaterally. Skin:  warm and dry. Psych:  Alert and oriented x3. Affect appropriate  Lymph:  No supraclavicular adenopathy. Neurologic:  No focal deficit. No seizures. Data: (All radiographs, tracings, PFTs, and imaging are personally viewed and interpreted unless otherwise noted).    Recent Results (from the past 24 hour(s))   POCT glycosylated hemoglobin (Hb A1C)    Collection Time: 07/20/21 12:38 PM   Result Value Ref Range    Hemoglobin A1C 8.1 (H) 4.3 - 5.7 %   CBC auto differential    Collection Time: 07/20/21  4:40 PM   Result Value Ref Range    WBC 9.3 4.8 - 10.8 thou/mm3    RBC 5.13 4.70 - 6.10 mill/mm3    Hemoglobin 15.9 14.0 - 18.0 gm/dl    Hematocrit 44.6 42.0 - 52.0 %    MCV 86.9 80.0 - 94.0 fL    MCH 31.0 26.0 - 33.0 pg    MCHC 35.7 (H) 32.2 - 35.5 gm/dl    RDW-CV 12.6 11.5 - 14.5 %    RDW-SD 39.2 35.0 - 45.0 fL    Platelets 092 871 - 698 thou/mm3    MPV 9.9 9.4 - 12.4 fL    Seg Neutrophils 76.3 %    Lymphocytes 15.4 %    Monocytes 6.7 %    Eosinophils 0.9 %    Basophils 0.2 %    Immature Granulocytes 0.5 %    Segs Absolute 7.1 1 - 7 thou/mm3    Lymphocytes Absolute 1.4 1.0 - 4.8 thou/mm3    Monocytes Absolute 0.6 0.4 - 1.3 thou/mm3    Eosinophils Absolute 0.1 0.0 - 0.4 thou/mm3    Basophils Absolute 0.0 0.0 - 0.1 thou/mm3    Immature Grans (Abs) 0.05 0.00 - 0.07 thou/mm3    nRBC 0 /100 wbc   Comprehensive Metabolic Panel w/ Reflex to MG    Collection Time: 07/20/21  4:40 PM   Result Value Ref Range    Glucose 400 (H) 70 - 108 mg/dL    CREATININE 0.7 0.4 - 1.2 mg/dL    BUN 9 7 - 22 mg/dL    Sodium 137 135 - 145 meq/L    Potassium reflex Magnesium 4.2 3.5 - 5.2 meq/L    Chloride 101 98 - 111 meq/L    CO2 25 23 - 33 meq/L    Calcium 9.7 8.5 - 10.5 mg/dL    AST 12 5 - 40 U/L    Alkaline Phosphatase 107 38 - 126 U/L    Total Protein 6.8 6.1 - 8.0 g/dL    Albumin 4.3 3.5 - 5.1 g/dL    Total Bilirubin 0.9 0.3 - 1.2 mg/dL    ALT 16 11 - 66 U/L   C-reactive protein    Collection Time: 07/20/21  4:40 PM   Result Value Ref Range    CRP 3.46 (H) 0.00 - 1.00 mg/dl   Anion Gap    Collection Time: 07/20/21  4:40 PM   Result Value Ref Range    Anion Gap 11.0 8.0 - 16.0 meq/L   Glomerular Filtration Rate, Estimated    Collection Time: 07/20/21  4:40 PM   Result Value Ref Range    Est, Glom Filt Rate >90 ml/min/1.73m2   Osmolality    Collection Time: 07/20/21  4:40 PM   Result Value Ref Range    Osmolality Calc 289.3 275.0 - 300.0 mOsmol/kg   Culture, Anaerobic and Aerobic    Collection Time: 07/20/21  5:15 PM    Specimen: Leg   Result Value Ref Range    Gram Stain Result       Few segmented neutrophils observed. Rare epithelial cells observed. Moderate gram positive cocci occurring singly and in pairs. Culture, Anaerobic and Aerobic    Collection Time: 07/20/21  5:15 PM    Specimen: Leg   Result Value Ref Range    Gram Stain Result       Few segmented neutrophils observed. Rare epithelial cells observed. Moderate gram positive cocci occurring singly and in pairs. Culture, Anaerobic and Aerobic    Collection Time: 07/20/21  6:15 PM    Specimen: Heel   Result Value Ref Range    Gram Stain Result       Few segmented neutrophils observed. Rare epithelial cells observed. Many gram positive cocci occurring singly and in pairs. POCT glucose    Collection Time: 07/20/21  8:11 PM   Result Value Ref Range    POC Glucose 306 (H) 70 - 108 mg/dl         CT TIBIA FIBULA LEFT WO CONTRAST   Final Result   1. Degenerative changes of the left knee. Postsurgical changes left knee. Multiple varicosities this left calf. 2. Study otherwise unremarkable.  No tibial or fibular fracture is seen. **This report has been created using voice recognition software. It may contain minor errors which are inherent in voice recognition technology. **      Final report electronically signed by Dr. Jody Miller on 7/20/2021 9:07 PM      XR FOOT LEFT (MIN 3 VIEWS)   Final Result   Plantar calcaneal spur. Otherwise normal left foot. **This report has been created using voice recognition software. It may contain minor errors which are inherent in voice recognition technology. **      Final report electronically signed by Dr. Jody Miller on 7/20/2021 3:57 PM             Electronically signed by Gulshan Garcia MD on 7/20/2021 at 9:55 PM

## 2021-07-21 NOTE — PROGRESS NOTES
Pharmacy Note  Vancomycin Consult    Augustus Rizzo is a 46 y.o. male started on Vancomycin for suspected sepsis; consult received from Dr. Francisco Forrester to manage therapy. Also receiving the following antibiotics: Zosyn. Patient Active Problem List   Diagnosis    Depression    Liver disease    Restless legs syndrome    Chronic back pain    Other chest pain    Irritable bowel syndrome    Non compliance w medication regimen    Hypertension associated with diabetes ([address]    Hyperlipidemia with target LDL less than 100    IBS (irritable bowel syndrome)    Sinus tachycardia    Confusion    Encephalopathy    Diabetes mellitus type II, uncontrolled (HCC)    Dizziness    Neuropathy    Hyperammonemia (HCC)    Medication overdose, insulin    Suicide attempt ([address]    Hypoglycemia    Hypokalemia    Lithium toxicity    Nausea & vomiting    Snoring    Hypogonadism    Erectile dysfunction due to diseases classified elsewhere    Rash    Headaches due to old head injury    Abdominal wall pain in left lower quadrant    Bipolar affective disorder (HCC)    Hypogonadism in male    Type 2 diabetes mellitus with diabetic neuropathy (HCC)    Hypomagnesemia    Partial seizure (HCC)    Syncope and collapse    Headache disorder    Partial symptomatic epilepsy with complex partial seizures, not intractable, without status epilepticus ([address]    Respiratory acidosis    Essential hypertension    Gastritis    Gastroesophageal reflux disease    Mixed hyperlipidemia    Acute lateral meniscus tear of right knee    Recurrent right inguinal hernia    Bicipital tendinitis of right shoulder    Carpal tunnel syndrome of right wrist    Obesity (BMI 30-39. 9)    Lump of axilla    Familial hypercholesterolemia    Coronary artery disease of native artery of native heart with stable angina pectoris (HCC)    Exertional dyspnea    Vitamin D deficiency    Seizures (HCC)    Nausea and vomiting    Type II diabetes mellitus with manifestations, uncontrolled

## 2021-07-22 LAB
ANION GAP SERPL CALCULATED.3IONS-SCNC: 9 MEQ/L (ref 8–16)
BLOOD CULTURE, ROUTINE: ABNORMAL
BLOOD CULTURE, ROUTINE: ABNORMAL
BUN BLDV-MCNC: 6 MG/DL (ref 7–22)
CALCIUM SERPL-MCNC: 8.2 MG/DL (ref 8.5–10.5)
CHLORIDE BLD-SCNC: 103 MEQ/L (ref 98–111)
CO2: 28 MEQ/L (ref 23–33)
CREAT SERPL-MCNC: 0.7 MG/DL (ref 0.4–1.2)
ERYTHROCYTE [DISTWIDTH] IN BLOOD BY AUTOMATED COUNT: 12.2 % (ref 11.5–14.5)
ERYTHROCYTE [DISTWIDTH] IN BLOOD BY AUTOMATED COUNT: 39.2 FL (ref 35–45)
GFR SERPL CREATININE-BSD FRML MDRD: > 90 ML/MIN/1.73M2
GLUCOSE BLD-MCNC: 129 MG/DL (ref 70–108)
GLUCOSE BLD-MCNC: 171 MG/DL (ref 70–108)
GLUCOSE BLD-MCNC: 274 MG/DL (ref 70–108)
GLUCOSE BLD-MCNC: 283 MG/DL (ref 70–108)
GLUCOSE BLD-MCNC: 381 MG/DL (ref 70–108)
HCT VFR BLD CALC: 36 % (ref 42–52)
HEMOGLOBIN: 12.6 GM/DL (ref 14–18)
MCH RBC QN AUTO: 30.5 PG (ref 26–33)
MCHC RBC AUTO-ENTMCNC: 35 GM/DL (ref 32.2–35.5)
MCV RBC AUTO: 87.2 FL (ref 80–94)
ORGANISM: ABNORMAL
PLATELET # BLD: 187 THOU/MM3 (ref 130–400)
PMV BLD AUTO: 9.9 FL (ref 9.4–12.4)
POTASSIUM REFLEX MAGNESIUM: 3.7 MEQ/L (ref 3.5–5.2)
RBC # BLD: 4.13 MILL/MM3 (ref 4.7–6.1)
SODIUM BLD-SCNC: 140 MEQ/L (ref 135–145)
VANCOMYCIN RANDOM: 7.3 UG/ML (ref 0.1–39.9)
WBC # BLD: 7.9 THOU/MM3 (ref 4.8–10.8)

## 2021-07-22 PROCEDURE — 6370000000 HC RX 637 (ALT 250 FOR IP): Performed by: STUDENT IN AN ORGANIZED HEALTH CARE EDUCATION/TRAINING PROGRAM

## 2021-07-22 PROCEDURE — 6360000002 HC RX W HCPCS: Performed by: STUDENT IN AN ORGANIZED HEALTH CARE EDUCATION/TRAINING PROGRAM

## 2021-07-22 PROCEDURE — 2580000003 HC RX 258: Performed by: INTERNAL MEDICINE

## 2021-07-22 PROCEDURE — 82948 REAGENT STRIP/BLOOD GLUCOSE: CPT

## 2021-07-22 PROCEDURE — 6370000000 HC RX 637 (ALT 250 FOR IP): Performed by: INTERNAL MEDICINE

## 2021-07-22 PROCEDURE — 6360000002 HC RX W HCPCS

## 2021-07-22 PROCEDURE — 99232 SBSQ HOSP IP/OBS MODERATE 35: CPT | Performed by: PHYSICIAN ASSISTANT

## 2021-07-22 PROCEDURE — 85027 COMPLETE CBC AUTOMATED: CPT

## 2021-07-22 PROCEDURE — 6370000000 HC RX 637 (ALT 250 FOR IP): Performed by: PHYSICIAN ASSISTANT

## 2021-07-22 PROCEDURE — 36415 COLL VENOUS BLD VENIPUNCTURE: CPT

## 2021-07-22 PROCEDURE — 1200000000 HC SEMI PRIVATE

## 2021-07-22 PROCEDURE — 80202 ASSAY OF VANCOMYCIN: CPT

## 2021-07-22 PROCEDURE — 6360000002 HC RX W HCPCS: Performed by: INTERNAL MEDICINE

## 2021-07-22 PROCEDURE — 80048 BASIC METABOLIC PNL TOTAL CA: CPT

## 2021-07-22 PROCEDURE — 2580000003 HC RX 258

## 2021-07-22 RX ORDER — LACTOBACILLUS RHAMNOSUS GG 10B CELL
1 CAPSULE ORAL
Status: DISCONTINUED | OUTPATIENT
Start: 2021-07-23 | End: 2021-07-27 | Stop reason: HOSPADM

## 2021-07-22 RX ORDER — LIDOCAINE HYDROCHLORIDE 10 MG/ML
INJECTION, SOLUTION EPIDURAL; INFILTRATION; INTRACAUDAL; PERINEURAL
Status: DISPENSED
Start: 2021-07-22 | End: 2021-07-23

## 2021-07-22 RX ORDER — OXYCODONE HYDROCHLORIDE AND ACETAMINOPHEN 5; 325 MG/1; MG/1
2 TABLET ORAL EVERY 4 HOURS PRN
Status: DISCONTINUED | OUTPATIENT
Start: 2021-07-22 | End: 2021-07-27 | Stop reason: HOSPADM

## 2021-07-22 RX ORDER — OXYCODONE HYDROCHLORIDE AND ACETAMINOPHEN 5; 325 MG/1; MG/1
1 TABLET ORAL EVERY 4 HOURS PRN
Status: DISCONTINUED | OUTPATIENT
Start: 2021-07-22 | End: 2021-07-27 | Stop reason: HOSPADM

## 2021-07-22 RX ADMIN — SODIUM CHLORIDE, PRESERVATIVE FREE 10 ML: 5 INJECTION INTRAVENOUS at 15:40

## 2021-07-22 RX ADMIN — DIVALPROEX SODIUM 500 MG: 500 TABLET, DELAYED RELEASE ORAL at 21:21

## 2021-07-22 RX ADMIN — GABAPENTIN 600 MG: 600 TABLET, FILM COATED ORAL at 13:54

## 2021-07-22 RX ADMIN — INSULIN GLARGINE 75 UNITS: 100 INJECTION, SOLUTION SUBCUTANEOUS at 21:23

## 2021-07-22 RX ADMIN — PANTOPRAZOLE SODIUM 40 MG: 40 TABLET, DELAYED RELEASE ORAL at 17:49

## 2021-07-22 RX ADMIN — HYDROCODONE BITARTRATE AND ACETAMINOPHEN 1 TABLET: 5; 325 TABLET ORAL at 03:38

## 2021-07-22 RX ADMIN — SODIUM CHLORIDE, POTASSIUM CHLORIDE, SODIUM LACTATE AND CALCIUM CHLORIDE: 600; 310; 30; 20 INJECTION, SOLUTION INTRAVENOUS at 06:51

## 2021-07-22 RX ADMIN — ZONISAMIDE 500 MG: 100 CAPSULE ORAL at 21:20

## 2021-07-22 RX ADMIN — SODIUM CHLORIDE, POTASSIUM CHLORIDE, SODIUM LACTATE AND CALCIUM CHLORIDE: 600; 310; 30; 20 INJECTION, SOLUTION INTRAVENOUS at 15:47

## 2021-07-22 RX ADMIN — PANTOPRAZOLE SODIUM 40 MG: 40 TABLET, DELAYED RELEASE ORAL at 06:23

## 2021-07-22 RX ADMIN — DULOXETINE HYDROCHLORIDE 60 MG: 60 CAPSULE, DELAYED RELEASE ORAL at 10:25

## 2021-07-22 RX ADMIN — LEVETIRACETAM 2000 MG: 500 TABLET, FILM COATED ORAL at 21:21

## 2021-07-22 RX ADMIN — GABAPENTIN 600 MG: 600 TABLET, FILM COATED ORAL at 21:21

## 2021-07-22 RX ADMIN — METOPROLOL TARTRATE 75 MG: 25 TABLET, FILM COATED ORAL at 21:20

## 2021-07-22 RX ADMIN — DIVALPROEX SODIUM 500 MG: 500 TABLET, DELAYED RELEASE ORAL at 10:25

## 2021-07-22 RX ADMIN — Medication 1250 MG: at 22:19

## 2021-07-22 RX ADMIN — Medication 1250 MG: at 06:22

## 2021-07-22 RX ADMIN — INSULIN LISPRO 1 UNITS: 100 INJECTION, SOLUTION INTRAVENOUS; SUBCUTANEOUS at 21:23

## 2021-07-22 RX ADMIN — MORPHINE SULFATE 2 MG: 2 INJECTION, SOLUTION INTRAMUSCULAR; INTRAVENOUS at 13:40

## 2021-07-22 RX ADMIN — MORPHINE SULFATE 2 MG: 2 INJECTION, SOLUTION INTRAMUSCULAR; INTRAVENOUS at 17:43

## 2021-07-22 RX ADMIN — OXYCODONE AND ACETAMINOPHEN 2 TABLET: 5; 325 TABLET ORAL at 10:44

## 2021-07-22 RX ADMIN — INSULIN GLARGINE 75 UNITS: 100 INJECTION, SOLUTION SUBCUTANEOUS at 10:23

## 2021-07-22 RX ADMIN — OXYCODONE AND ACETAMINOPHEN 2 TABLET: 5; 325 TABLET ORAL at 19:01

## 2021-07-22 RX ADMIN — LEVETIRACETAM 2000 MG: 500 TABLET, FILM COATED ORAL at 10:25

## 2021-07-22 RX ADMIN — PROMETHAZINE HYDROCHLORIDE 12.5 MG: 25 TABLET ORAL at 12:22

## 2021-07-22 RX ADMIN — ROPINIROLE HYDROCHLORIDE 1 MG: 1 TABLET, FILM COATED ORAL at 10:26

## 2021-07-22 RX ADMIN — MORPHINE SULFATE 2 MG: 2 INJECTION, SOLUTION INTRAMUSCULAR; INTRAVENOUS at 09:19

## 2021-07-22 RX ADMIN — LACOSAMIDE 200 MG: 50 TABLET, FILM COATED ORAL at 21:19

## 2021-07-22 RX ADMIN — SODIUM CHLORIDE, PRESERVATIVE FREE 10 ML: 5 INJECTION INTRAVENOUS at 10:29

## 2021-07-22 RX ADMIN — Medication 1250 MG: at 15:39

## 2021-07-22 RX ADMIN — LACOSAMIDE 200 MG: 50 TABLET, FILM COATED ORAL at 10:28

## 2021-07-22 RX ADMIN — GABAPENTIN 600 MG: 600 TABLET, FILM COATED ORAL at 10:25

## 2021-07-22 RX ADMIN — ROPINIROLE HYDROCHLORIDE 1 MG: 1 TABLET, FILM COATED ORAL at 13:56

## 2021-07-22 RX ADMIN — GABAPENTIN 600 MG: 600 TABLET, FILM COATED ORAL at 17:50

## 2021-07-22 RX ADMIN — METOPROLOL TARTRATE 75 MG: 25 TABLET, FILM COATED ORAL at 10:26

## 2021-07-22 RX ADMIN — ROPINIROLE HYDROCHLORIDE 1 MG: 1 TABLET, FILM COATED ORAL at 21:20

## 2021-07-22 ASSESSMENT — PAIN SCALES - GENERAL
PAINLEVEL_OUTOF10: 8
PAINLEVEL_OUTOF10: 4
PAINLEVEL_OUTOF10: 8
PAINLEVEL_OUTOF10: 10
PAINLEVEL_OUTOF10: 0
PAINLEVEL_OUTOF10: 4
PAINLEVEL_OUTOF10: 10
PAINLEVEL_OUTOF10: 8
PAINLEVEL_OUTOF10: 8
PAINLEVEL_OUTOF10: 9
PAINLEVEL_OUTOF10: 8

## 2021-07-22 NOTE — PLAN OF CARE
Problem: Pain:  Goal: Pain level will decrease  Description: Pain level will decrease  Outcome: Ongoing  Goal: Control of acute pain  Description: Control of acute pain  Outcome: Ongoing  Goal: Control of chronic pain  Description: Control of chronic pain  Outcome: Ongoing     Problem: Falls - Risk of:  Goal: Will remain free from falls  Description: Will remain free from falls  Outcome: Ongoing  Goal: Absence of physical injury  Description: Absence of physical injury  Outcome: Ongoing     Problem: Discharge Planning:  Goal: Discharged to appropriate level of care  Description: Discharged to appropriate level of care  Outcome: Ongoing     Problem: Serum Glucose Level - Abnormal:  Goal: Ability to maintain appropriate glucose levels will improve  Description: Ability to maintain appropriate glucose levels will improve  Outcome: Ongoing     Problem: Sensory Perception - Impaired:  Goal: Ability to maintain a stable neurologic state will improve  Description: Ability to maintain a stable neurologic state will improve  Outcome: Ongoing     Problem: Infection - Methicillin-Resistant Staphylococcus Aureus Infection:  Goal: Absence of methicillin-resistant Staphylococcus aureus infection  Description: Absence of methicillin-resistant Staphylococcus aureus infection  Outcome: Ongoing

## 2021-07-22 NOTE — PROGRESS NOTES
Pharmacy Vancomycin Consult     Vancomycin Day: 3  Current Dosinmg Q12h  Current indication: Sepsis, soft tissue infection, bacteremia     Temp max:  98.9    Recent Labs     21  0602 21  0541   BUN 8 6*   CREATININE 0.9 0.7   WBC 8.5 7.9       Intake/Output Summary (Last 24 hours) at 2021 0825  Last data filed at 2021 2234  Gross per 24 hour   Intake 2262.87 ml   Output 200 ml   Net 2062.87 ml     Culture Date Source Results   21 Swab RLE proximal wound MRSA   21 Swab RLE distal MRSA   21 Swab & tissue left heel MRSA   21 BC 1 --   21 BC 2 GPC in clusters       Ht Readings from Last 1 Encounters:   21 6' 2\" (1.88 m)        Wt Readings from Last 1 Encounters:   21 244 lb 1.6 oz (110.7 kg)       Body mass index is 31.34 kg/m². Estimated Creatinine Clearance: 165 mL/min (based on SCr of 0.7 mg/dL). Trough: 7.3 mcg/mL    Assessment/Plan:  Increase dose to vancomycin 1250mg Q8h. Next level scheduled for 21 0600.  New projected AUC per [employer] is 525 (see below)        Williams Cortes, PharmD  2021 8:43 AM

## 2021-07-22 NOTE — PROGRESS NOTES
Progress note: Infectious diseases    Patient - Sofya Headley,  Age - 46 y.o.    - 1970      Room Number - 1L-18/195-O   MRN -  [id number]   Acct # - [de-identified]  Date of Admission -  2021  3:31 PM    SUBJECTIVE:   He has no new complaints. He denies any fever or chills  OBJECTIVE   VITALS    height is 6' 2\" (1.88 m) and weight is 244 lb 1.6 oz (110.7 kg). His oral temperature is 97.9 °F (36.6 °C). His blood pressure is 118/60 and his pulse is 73. His respiration is 16 and oxygen saturation is 94%.        Wt Readings from Last 3 Encounters:   21 244 lb 1.6 oz (110.7 kg)   21 254 lb (115.2 kg)   21 249 lb 3.2 oz (113 kg)       I/O (24 Hours)    Intake/Output Summary (Last 24 hours) at 2021 1830  Last data filed at 2021 1540  Gross per 24 hour   Intake 3541.7 ml   Output 200 ml   Net 3341.7 ml       General Appearance  Awake, alert, oriented,  not  In acute distress  HEENT - normocephalic, atraumatic, pink conjunctiva,  anicteric sclera  Neck - Supple, no mass  Lungs -  Bilateral good air entry, no rhonchi, no wheeze  Cardiovascular - Heart sounds are normal.     Abdomen - soft, not distended, nontender,   Neurologic -oriented to person place and time  Skin -scabbed wound on his right lower shin no drainage or erythema, he had dressed the left heel wound   extremities - No edema, no cyanosis, clubbing     MEDICATIONS:      vancomycin  1,250 mg Intravenous Q8H    [START ON 2021] lactobacillus  1 capsule Oral Daily with breakfast    lidocaine PF        insulin lispro  10 Units Subcutaneous TID WC    bupivacaine (PF)  30 mL Intradermal Once    divalproex  500 mg Oral BID    DULoxetine  60 mg Oral Daily    empagliflozin  25 mg Oral Daily    gabapentin  600 mg Oral 4x Daily    insulin glargine  75 Units Subcutaneous BID    lacosamide  200 mg Oral BID    levETIRAcetam  2,000 mg Oral BID    metoprolol tartrate  75 mg Oral BID    pantoprazole  40 mg Oral BID    rOPINIRole  1 mg Oral TID    zonisamide  500 mg Oral Nightly    sodium chloride flush  5-40 mL Intravenous 2 times per day    insulin lispro  0-12 Units Subcutaneous TID WC    insulin lispro  0-6 Units Subcutaneous Nightly    vancomycin (VANCOCIN) intermittent dosing (placeholder)   Other RX Placeholder      sodium chloride 25 mL (07/20/21 1936)    dextrose      lactated ringers 125 mL/hr at 07/22/21 1547     oxyCODONE-acetaminophen **OR** oxyCODONE-acetaminophen, morphine, promethazine, sodium chloride flush, sodium chloride, acetaminophen **OR** acetaminophen, glucose, dextrose, glucagon (rDNA), dextrose      LABS:     CBC:   Recent Labs     07/20/21  1640 07/21/21  0602 07/22/21  0541   WBC 9.3 8.5 7.9   HGB 15.9 14.0 12.6*    191 187     BMP:    Recent Labs     07/20/21  1640 07/21/21  0602 07/22/21  0541    141 140   K 4.2 4.2 3.7    103 103   CO2 25 25 28   BUN 9 8 6*   CREATININE 0.7 0.9 0.7   GLUCOSE 400* 379* 274*     Calcium:  Recent Labs     07/22/21  0541   CALCIUM 8.2*      Recent Labs     07/22/21  0619 07/22/21  1124 07/22/21  1601   POCGLU 283* 381* 129*     HgbA1C:   Recent Labs     07/20/21  1630   LABA1C 8.3*     INR: No results for input(s): INR in the last 72 hours. Hepatic:   Recent Labs     07/20/21  1640   ALKPHOS 107   ALT 16   AST 12   PROT 6.8   BILITOT 0.9   LABALBU 4.3        CULTURES:   UA: No results for input(s): SPECGRAV, PHUR, COLORU, CLARITYU, MUCUS, PROTEINU, BLOODU, RBCUA, WBCUA, BACTERIA, NITRU, GLUCOSEU, BILIRUBINUR, UROBILINOGEN, KETUA, LABCAST, LABCASTTY, AMORPHOS in the last 72 hours.     Invalid input(s): CRYSTALS  Micro:   Lab Results   Component Value Date    BC No growth-preliminary  07/20/2021        Problem list of patient:     Patient Active Problem List   Diagnosis Code    Depression F32.9    Liver disease K76.9    Restless legs syndrome G25.81    Chronic back pain M54.9, G89.29    Other chest pain R07.89    Irritable bowel syndrome K58.9    Non compliance w medication regimen Z91.14    Hypertension associated with diabetes ([facility] E11.59, I15.2    Hyperlipidemia with target LDL less than 100 E78.5    IBS (irritable bowel syndrome) K58.9    Sinus tachycardia R00.0    Confusion R41.0    Encephalopathy G93.40    Diabetes mellitus type II, uncontrolled ([facility]) E11.65    Dizziness R42    Neuropathy G62.9    Hyperammonemia ([facility]) E72.20    Medication overdose, insulin T50.901A    Suicide attempt ([facility]) T14.91XA    Hypoglycemia E16.2    Hypokalemia E87.6    Lithium toxicity T56.891A    Nausea & vomiting R11.2    Snoring R06.83    Hypogonadism [id number]    Erectile dysfunction due to diseases classified elsewhere N52.1    Rash R21    Headaches due to old head injury G44.309, S09.90XS    Abdominal wall pain in left lower quadrant R10.32    Bipolar affective disorder ([facility] F31.9    Hypogonadism in male E29.1    Type 2 diabetes mellitus with diabetic neuropathy ([facility]) E11.40    Hypomagnesemia E83.42    Partial seizure ([facility] R56.9    Syncope and collapse R55    Headache disorder R51.9    Partial symptomatic epilepsy with complex partial seizures, not intractable, without status epilepticus ([facility]) G40.209    Respiratory acidosis E87.2    Essential hypertension I10    Gastritis K29.70    Gastroesophageal reflux disease K21.9    Mixed hyperlipidemia E78.2    Acute lateral meniscus tear of right knee S83.281A    Recurrent right inguinal hernia K40.91    Bicipital tendinitis of right shoulder M75.21    Carpal tunnel syndrome of right wrist G56.01    Obesity (BMI 30-39. 9) E66.9    Lump of axilla R22.30    Familial hypercholesterolemia E78.01    Coronary artery disease of native artery of native heart with stable angina pectoris ([facility]) I25.118    Exertional dyspnea R06.00    Vitamin D deficiency E55.9    Seizures ([facility]) R56.9    Nausea and vomiting R11.2    Type II diabetes mellitus with manifestations, uncontrolled ([facility]) E11.8, E11.65    Chondromalacia of knee, right M94.261    Effusion of left knee M25.462    Elevated levels of transaminase & lactic acid dehydrogenase R74.01, R74.02    Other intervertebral disc degeneration, lumbar region M51.36    Palpitations R00.2    Splenomegaly R16.1    Sprain of right foot S93.601A    Steatohepatitis K75.81    Osteopenia M85.80    Obstructive sleep apnea C10.48    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 8XXA, L08.9         ASSESSMENT/PLAN   Soft tissue infection of the left heel and right lower leg due to MRSA  Continue current treatment. We will transition him to oral antibiotic on discharge.       Iram Hughes MD, MD, FACP 7/22/2021 6:30 PM no

## 2021-07-22 NOTE — PLAN OF CARE
Problem: Nutrition  Goal: Optimal nutrition therapy  Outcome: Ongoing   Nutrition Problem #1: Increased nutrient needs  Intervention: Food and/or Nutrient Delivery:  (Diet per Dr. pt declines ONS. Recommend 2 protein choices per meal)  Nutritional Goals: 75% or more of lean protein CHO contolled diet to assist with wound healing.

## 2021-07-22 NOTE — PROGRESS NOTES
Hospitalist Progress Note      Patient:  Mattie Krishnamurthy    Unit/Bed:7K-19/019-A  YOB: 1970  MRN: [id number]   Acct: [de-identified]   PCP: Chelita Dominguez MD  Date of Admission: 7/20/2021    Assessment/Plan:    1. Multiple Wounds: Cellulitis with Abscess, Left heel & Diabetic Ulcers, RLE: Podiatry & ID consulted. Continue IV ABX. Following cultures, blood cultures are NGTD. Pt is afebrile and no leukocytosis. Norco changed to Q6H from [facility] for better pain control. Podiatry plans a bedside debridement on 7/23.   2. IDDM, uncontrolled: HgbA1c 8.3, BS 300s. Insulin regimen: Lantus 75 units BID + Humalog 10 units + SSI. Humalog 10 was not restarted on admission, will continue to monitor with this addition. 3. Seizure DO: Continue home medications. 4. Essential HTN: BP is stable. Continue home medications. 5. HLD: Statin. Chief Complaint: Leg Wounds     Initial H and P:-    Initial H&P \"Patient was sent here after a appointment with his primary care physician. He has been having issues with nonhealing lower extremity wounds. He has been trying to keep the wounds clean with soap and water Hibiclens and an biotic ointment. However the wounds are getting worse. Has a history of MRSA as well. He also has noted that his blood sugars have been significantly elevated as of late. He states he also has not ate or drink very well recently. He is brought to the ER and seen by podiatry who performed some debridement and otherwise he is being admitted for IV antibiotics for the treatment of these wounds. \"     7/21: No acute events overnight. Pt states that his pain is not controlled at all. Podiatry consulted and awaiting surgical decision. ID consulted today. Subjective (past 24 hours):   No acute events overnight. Pt states that her pain is relatively controlled. Pt states that his BS is high due to his insulin being late.        Past medical Conjunctivae/corneas clear. Neck: Supple, with full range of motion. No jugular venous distention. Trachea midline. Respiratory:  Normal respiratory effort on RA. Clear to auscultation, bilaterally without Rales/Wheezes/Rhonchi. Cardiovascular: Regular rate and rhythm with normal S1/S2 without murmurs, rubs or gallops. Abdomen: Soft, non-tender, non-distended with normal bowel sounds. Musculoskeletal: passive and active ROM x 4 extremities. Skin: Left heel wound. RLE multiple wounds on shin. Neurologic:  Neurovascularly intact without any focal sensory/motor deficits. Cranial nerves: II-XII intact, grossly non-focal.  Psychiatric: Alert and oriented, thought content appropriate, normal insight  Capillary Refill: Brisk,< 3 seconds   Peripheral Pulses: +2 palpable, equal bilaterally     Labs:   Recent Labs     07/20/21  1640 07/21/21  0602 07/22/21  0541   WBC 9.3 8.5 7.9   HGB 15.9 14.0 12.6*   HCT 44.6 40.1* 36.0*    191 187     Recent Labs     07/20/21  1640 07/21/21  0602 07/22/21  0541    141 140   K 4.2 4.2 3.7    103 103   CO2 25 25 28   BUN 9 8 6*   CREATININE 0.7 0.9 0.7   CALCIUM 9.7 8.5 8.2*     Recent Labs     07/20/21  1640   AST 12   ALT 16   BILITOT 0.9   ALKPHOS 107     Microbiology:    Blood culture #1:   Lab Results   Component Value Date    BC No growth-preliminary  07/20/2021       Lab Results   Component Value Date    LABGRAM  07/20/2021     Few segmented neutrophils observed. Rare epithelial cells observed. Many gram positive cocci occurring singly and in pairs. Urinalysis:      Lab Results   Component Value Date    NITRU NEGATIVE 02/08/2021    WBCUA NONE 08/12/2019    WBCUA 6-10 10/17/2015    BACTERIA NONE 08/12/2019    RBCUA NONE 08/12/2019    BLOODU NEGATIVE 02/08/2021    SPECGRAV >1.030 08/12/2019    GLUCOSEU >= 1000 02/08/2021       Radiology:  MRI ANKLE LEFT W WO CONTRAST   Final Result       1.  Increased signal intensity in the plantar soft tissues over the calcaneus which may represent a clinically described ulcer. No definite evidence of osteomyelitis in the underlying calcaneus. 2. Slightly increased fluid in the distal tibialis posterior tendon and along the peroneus brevis and longus tendon sheaths. 3. Increased signal intensity in the interosseous ligament between the talus and calcaneus which may be torn. 4. Abnormal signal intensity in the muscles along the medial aspect of the hindfoot. This may represent a tear of the abductor hallucis muscle belly. 5. Increased signal intensity in the flexor digitorum brevis muscle belly which may represent a partial tear. 6. Thickening at the attachment of the plantar fascia upon the calcaneus. 7. Small osteochondral defect in the medial talar dome. 8. Areas of abnormal signal intensity in the anterior process of the calcaneus, base of cuboid bone and tip of lateral malleolus suggestive of degenerative changes. 9. Increased signal intensity in the subcutaneous soft tissues over the medial and lateral aspects of the hindfoot consistent with edema and/or cellulitis. 10. Fluid collection along the talonavicular joint. **This report has been created using voice recognition software. It may contain minor errors which are inherent in voice recognition technology. **      Final report electronically signed by DR Forrest Lopez on 7/21/2021 3:25 PM      CT TIBIA FIBULA LEFT WO CONTRAST   Final Result   1. Degenerative changes of the left knee. Postsurgical changes left knee. Multiple varicosities this left calf. 2. Study otherwise unremarkable. No tibial or fibular fracture is seen. **This report has been created using voice recognition software. It may contain minor errors which are inherent in voice recognition technology. **      Final report electronically signed by Dr. Elana Louis on 7/20/2021 9:07 PM      XR FOOT LEFT (MIN 3 VIEWS)   Final Result   Plantar calcaneal spur. Otherwise normal left foot. **This report has been created using voice recognition software. It may contain minor errors which are inherent in voice recognition technology. **      Final report electronically signed by Dr. Kathya Hooper on 7/20/2021 3:57 PM        Electronically signed by ASHLEY Cheung on 7/22/2021 at 12:51 PM

## 2021-07-22 NOTE — FLOWSHEET NOTE
[facility] 88 PROGRESS NOTE      Patient: April Stinson  Room #: 7E-40/326-X            YOB: 1970  Age: 46 y.o. Gender: male            Admit Date & Time: 7/20/2021  3:31 PM    Assessment:  Pt was laying in bed and awoke when  entered the room. Pt shared that he was on medications that were making him very sleepy. Pt shared that he is diabetic and he developed a wounds on his leg that he did not realize were present, because he couldn't feel them. Pt seems discouraged that he has spent so much time in the hospital this year. Pt and his wife are raising a grandson, and pt is distressed that he can't do things with him as he would like. Pt does not have a [employer] that he is a part of and seemed hesitant in believing God hears his prayers. Pt considers himself Yarsani and chart has been updated to reflect this fact. Interventions:   provided a listening and supportive presence and encouraged pt to voice his concerns and to find motivation to get better through his grandson. Outcomes:  Pt seemed slightly more upbeat at the end of the encounter. Plan:  1. Provide spiritual care and support. 2.  Continue exploring where pt can find meaning and purpose in life. Electronically signed by Gautam Argueta on 7/22/2021 at 7:36 PM.  [address]  [phone]         07/22/21 1835   Encounter Summary   Services provided to: Patient   Referral/Consult From: [facility]   Support System Spouse; Family members   Place of Nondenominational   (none)   Continue Visiting Yes  (7/22)   Complexity of Encounter Moderate   Length of Encounter 30 minutes   Spiritual Assessment Completed Yes   Routine   Type Initial   Assessment Approachable;Passive;Coping   Intervention Active listening;Explored feelings, thoughts, concerns;Explored coping resources; [place];Sustaining presence/ Ministry of presence; Discussed illness/injury and it's impact   Outcome Acceptance;Comfort;Engaged in conversation;Expressed feelings/needs/concerns

## 2021-07-22 NOTE — CARE COORDINATION
DISCHARGE/PLANNING EVALUATION  7/22/21, 10:22 AM EDT    Reason for Referral: home health  Mental Status: alert, oriented   Decision Making:  Makes own decisions   Family/Social/Home Environment:  Kimberly Weiss lives at home with his wife, his daughter and grandchildren. He has been managing his care at home with support from his family. He shares that his wife is an RN, and she can help with his care. Current Services including food security, transportation and housekeeping:  No current services, no food security, transportation or housekeeping concerns noted by patient  Current Equipment: cane, rolling walker   Payment Source: medicare   Concerns or Barriers to Discharge: patient shares that he may have additional surgery   Post acute provider list with quality measures, geographic area and applicable managed care information provided. Questions regarding selection process answered: declined list, does not feel that he needs HH or other services     Teach Back Method used with patient regarding care plan and discharge plan  Patient  verbalizes understanding of the plan of care and contributes to goal setting. Patient goals, treatment preferences and discharge plan:  Spoke with Kimberly Weiss about discharge plan. He is planning home with his family at discharge. He shares that his wife is an RN, and she is able to help manage his care at home. He declines HH.        Electronically signed by LARS Bustos on 7/22/2021 at 10:22 AM

## 2021-07-22 NOTE — PROGRESS NOTES
Podiatric Progress Note  Pierre Bridges  Subjective :   7/22/2021  Patient seen bedside today on behalf of Dr. Ming Blakely. Patient appeared pleasant, was oriented to person, place and time. Patient relates that he still has significant pain in his left foot. Pain rates 9/10 this morning. Patient states he has a lot of pain with dressing changes. He denies N/V/F/SOB/C/CP or other constitutional symptoms at this time. Patient has no further pedal concerns at this point in time. 7/21/2021  Patient seen bedside today alongside of Dr. Ming Blakely. Patient appeared pleasant, was oriented to person, place and time. Patient relates that he still has significant pain in his left foot. Pain is to the inside part of his heel. It is extremely painful with manipulation, and dressing changes are very difficult for him. He states that he received norco for pain earlier this morning, but it has not done anything for him as of yet. Patient endorses chills a couple days ago, but denies any nausea, vomiting, fever, chest pain, shortness of breath. Patient has no further pedal concerns at this point in time.      Current Medications:    Current Facility-Administered Medications: vancomycin (VANCOCIN) 1250 mg in dextrose 5 % 250 mL IVPB, 1,250 mg, Intravenous, Q8H  oxyCODONE-acetaminophen (PERCOCET) 5-325 MG per tablet 1 tablet, 1 tablet, Oral, Q4H PRN **OR** oxyCODONE-acetaminophen (PERCOCET) 5-325 MG per tablet 2 tablet, 2 tablet, Oral, Q4H PRN  insulin lispro (HUMALOG) injection vial 10 Units, 10 Units, Subcutaneous, TID WC  morphine (PF) injection 2 mg, 2 mg, Intravenous, Q4H PRN  bupivacaine (PF) (MARCAINE) 0.5 % injection 150 mg, 30 mL, Intradermal, Once  divalproex (DEPAKOTE) DR tablet 500 mg, 500 mg, Oral, BID  DULoxetine (CYMBALTA) extended release capsule 60 mg, 60 mg, Oral, Daily  empagliflozin (JARDIANCE) tablet TABS 25 mg, 25 mg, Oral, Daily  gabapentin (NEURONTIN) tablet 600 mg, 600 mg, Oral, 4x Daily  insulin glargine (LANTUS) injection vial 75 Units, 75 Units, Subcutaneous, BID  lacosamide (VIMPAT) tablet 200 mg, 200 mg, Oral, BID  levETIRAcetam (KEPPRA) tablet 2,000 mg, 2,000 mg, Oral, BID  metoprolol tartrate (LOPRESSOR) tablet 75 mg, 75 mg, Oral, BID  pantoprazole (PROTONIX) tablet 40 mg, 40 mg, Oral, BID  promethazine (PHENERGAN) tablet 12.5 mg, 12.5 mg, Oral, Q8H PRN  rOPINIRole (REQUIP) tablet 1 mg, 1 mg, Oral, TID  zonisamide (ZONEGRAN) capsule 500 mg, 500 mg, Oral, Nightly  sodium chloride flush 0.9 % injection 5-40 mL, 5-40 mL, Intravenous, 2 times per day  sodium chloride flush 0.9 % injection 5-40 mL, 5-40 mL, Intravenous, PRN  0.9 % sodium chloride infusion, 25 mL, Intravenous, PRN  acetaminophen (TYLENOL) tablet 650 mg, 650 mg, Oral, Q6H PRN **OR** acetaminophen (TYLENOL) suppository 650 mg, 650 mg, Rectal, Q6H PRN  glucose (GLUTOSE) 40 % oral gel 15 g, 15 g, Oral, PRN  dextrose 50 % IV solution, 12.5 g, Intravenous, PRN  glucagon (rDNA) injection 1 mg, 1 mg, Intramuscular, PRN  dextrose 5 % solution, 100 mL/hr, Intravenous, PRN  insulin lispro (HUMALOG) injection vial 0-12 Units, 0-12 Units, Subcutaneous, TID WC  insulin lispro (HUMALOG) injection vial 0-6 Units, 0-6 Units, Subcutaneous, Nightly  lactated ringers infusion, , Intravenous, Continuous  vancomycin (VANCOCIN) intermittent dosing (placeholder), , Other, RX Placeholder    Objective     /60   Pulse 73   Temp 97.9 °F (36.6 °C) (Oral)   Resp 16   Ht 6' 2\" (1.88 m)   Wt 244 lb 1.6 oz (110.7 kg)   SpO2 94%   BMI 31.34 kg/m²      I/O:    Intake/Output Summary (Last 24 hours) at 7/22/2021 1024  Last data filed at 7/21/2021 2234  Gross per 24 hour   Intake 2262.87 ml   Output 200 ml   Net 2062.87 ml              Wt Readings from Last 3 Encounters:   07/21/21 244 lb 1.6 oz (110.7 kg)   06/28/21 254 lb (115.2 kg)   04/13/21 249 lb 3.2 oz (113 kg)       LABS:    Recent Labs     07/21/21  0602 07/22/21  0541   WBC 8.5 7.9   HGB 14.0 12.6*   HCT 40.1* 36.0*   PLT fracture or dislocation is seen. There is no soft tissue swelling. There is a moderate-sized plantar calcaneal spur. Plantar calcaneal spur. Otherwise normal left foot. **This report has been created using voice recognition software. It may contain minor errors which are inherent in voice recognition technology. ** Final report electronically signed by Dr. Alejandra Haque on 7/20/2021 3:57 PM    CT TIBIA FIBULA LEFT WO CONTRAST    Result Date: 7/20/2021  PROCEDURE: NONCONTRAST CT LEFT TIBIA/FIBULA: CLINICAL INFORMATION: Infection/ abscess TECHNIQUE: Multiple axial 3 mm images of the left tibia/fibula were obtained without administration of intravenous contrast material. Computer generated sagittal and coronal images of the left tibia/fibula were also reconstructed. ALL CT SCANS AT THIS FACILITY use dose modulation, iterative reconstruction, and/or weight-based dosing when appropriate to reduce radiation dose to as low as reasonably achievable. FINDINGS: There are bony defects in the distal femur and proximal tibia conjunction with prior anterior crucial ligament repair. No acute fracture is seen. Appears be a small bone cyst in the lateral malleolus. There is mild degenerative spurring of the distal femur. Note that there appear to be multiple superficial varicosities in the distal left calf     1. Degenerative changes of the left knee. Postsurgical changes left knee. Multiple varicosities this left calf. 2. Study otherwise unremarkable. No tibial or fibular fracture is seen. **This report has been created using voice recognition software. It may contain minor errors which are inherent in voice recognition technology. ** Final report electronically signed by Dr. Alejandra Haque on 7/20/2021 9:07 PM    MRI ANKLE LEFT W WO CONTRAST    Result Date: 7/21/2021  PROCEDURE: MRI ANKLE LEFT W WO CONTRAST CLINICAL INFORMATION Wound, left heel, probes to bone, evaluate for abscess, osteomyelitis of left calcaneus COMPARISON: Plain radiographs dated 20 July 2021. CT scan of the left tib-fib dated 20 July 2021. . TECHNIQUE: MRI scan was carried out through the left ankle before and after intravenous administration of 20 mL of ProHance. Sonu Garcia FINDINGS: 1. Tendons: There is slightly increased fluid in the distal tibialis posterior tendon and along the peroneus brevis and longus tendon sheaths. The  Achilles, flexor digitorum longus and flexor hallucis longus tendons appear to be intact 2. Ligaments: There is slight thickening of the posterior talofibular and deltoid ligaments. The anterior talofibular and calcaneofibular ligaments appear to be intact. There is increased signal intensity in the interosseous ligament between the talus and calcaneus which may be torn. 3. Articular structures. There is no significant tibiotalar effusion. There is small osteochondral defect in the medial talar dome. There is a fluid collection along the talonavicular articulation. There is degenerative change involving the calcaneocuboid articulation. 4. Bony structures. The calcaneus appears to be intact. There is no definite evidence of osteomyelitis. There is abnormal signal intensity involving the anterior process of the calcaneus, base of the cuboid bone and tip of the lateral malleolus suggestive of degenerative changes. 5. Soft tissues. There is abnormal signal intensity in the plantar soft tissues overlying the calcaneus which may represent the clinically described ulcer. There is abnormal signal intensity in the muscles along the medial aspect of the hindfoot this may  represent a partially torn abductor hallucis muscle belly. There is increased signal intensity in the skin and subcutaneous soft tissues consistent with edema and/or cellulitis. There is slight thickening at the attachment of the plantar fascia upon the  calcaneus. There is slightly increased signal intensity in the flexor digitorum brevis muscle belly which may represent a partial tear      1. Increased signal intensity in the plantar soft tissues over the calcaneus which may represent a clinically described ulcer. No definite evidence of osteomyelitis in the underlying calcaneus. 2. Slightly increased fluid in the distal tibialis posterior tendon and along the peroneus brevis and longus tendon sheaths. 3. Increased signal intensity in the interosseous ligament between the talus and calcaneus which may be torn. 4. Abnormal signal intensity in the muscles along the medial aspect of the hindfoot. This may represent a tear of the abductor hallucis muscle belly. 5. Increased signal intensity in the flexor digitorum brevis muscle belly which may represent a partial tear. 6. Thickening at the attachment of the plantar fascia upon the calcaneus. 7. Small osteochondral defect in the medial talar dome. 8. Areas of abnormal signal intensity in the anterior process of the calcaneus, base of cuboid bone and tip of lateral malleolus suggestive of degenerative changes. 9. Increased signal intensity in the subcutaneous soft tissues over the medial and lateral aspects of the hindfoot consistent with edema and/or cellulitis. 10. Fluid collection along the talonavicular joint. **This report has been created using voice recognition software. It may contain minor errors which are inherent in voice recognition technology. ** Final report electronically signed by DR Ulises Sharif on 7/21/2021 3:25 PM    ASSESSMENT  Principle  1. Abscess, left foot  2. Puncture wound, left foot  3. Ulcerations, right leg  4.  Cellulitis, right proximal leg and left heel    Chronic  Patient Active Problem List   Diagnosis    Depression    Liver disease    Restless legs syndrome    Chronic back pain    Other chest pain    Irritable bowel syndrome    Non compliance w medication regimen    Hypertension associated with diabetes ([address]    Hyperlipidemia with target LDL less than 100    IBS (irritable bowel syndrome)    Sinus tachycardia    Confusion    Encephalopathy    Diabetes mellitus type II, uncontrolled (HCC)    Dizziness    Neuropathy    Hyperammonemia (HCC)    Medication overdose, insulin    Suicide attempt ([address]    Hypoglycemia    Hypokalemia    Lithium toxicity    Nausea & vomiting    Snoring    Hypogonadism    Erectile dysfunction due to diseases classified elsewhere    Rash    Headaches due to old head injury    Abdominal wall pain in left lower quadrant    Bipolar affective disorder ([address]    Hypogonadism in male    Type 2 diabetes mellitus with diabetic neuropathy (HCC)    Hypomagnesemia    Partial seizure ([address]    Syncope and collapse    Headache disorder    Partial symptomatic epilepsy with complex partial seizures, not intractable, without status epilepticus ([address]    Respiratory acidosis    Essential hypertension    Gastritis    Gastroesophageal reflux disease    Mixed hyperlipidemia    Acute lateral meniscus tear of right knee    Recurrent right inguinal hernia    Bicipital tendinitis of right shoulder    Carpal tunnel syndrome of right wrist    Obesity (BMI 30-39. 9)    Lump of axilla    Familial hypercholesterolemia    Coronary artery disease of native artery of native heart with stable angina pectoris (HCC)    Exertional dyspnea    Vitamin D deficiency    Seizures (HCC)    Nausea and vomiting    Type II diabetes mellitus with manifestations, uncontrolled (HCC)    Chondromalacia of knee, right    Effusion of left knee    Elevated levels of transaminase & lactic acid dehydrogenase    Other intervertebral disc degeneration, lumbar region    Palpitations    Splenomegaly    Sprain of right foot    Steatohepatitis    Osteopenia    Obstructive sleep apnea    Follicular neoplasm of thyroid    Nasal obstruction    Nasal septal deviation    Chest pain    MISTI (obstructive sleep apnea)    Acute post-operative pain    Bipolar 1 disorder, depressed ([address]    Status post uvulopalatopharyngoplasty    Status post partial thyroidectomy    Wound infection       PLAN:   - Patient was examined and evaluated  - WBC 7.9, afebrile  - Labs reviewed, creatinine 0.7  - Blood cultures preliminarily negative x2  - Cultures yield: MRSA light growth  - MRI reviewed, see report above  - Continue IV Antibiotics vancomycin per ID recommendations  - Dressings changed, right leg: adaptic, gauze, kerlix, and ACE bandage to RLE, mesalt packing, gauze, kerlix, ACE to LLE  - 2mg morphine PRN prescribed for pain with dressing changes  - Percocet 1 tab for pain moderate, 2 tab pain severe  - Podiatry will change dressings daily  - Nonweightbearing to the to the left foot. WBAT to RLE  - Podiatry will continue to follow    DISPO: Pending response to IV abx, bedside I&D planning for 7/23/21    Artemio Matamoros DPM  Podiatric Surgical Resident  7/22/2021   10:24 AM      I have inpendently examined the patient, above information and plan discussed with the patient. Labs, cultures, and x-rays were reviewed. I agree with the plan. All questions answered.

## 2021-07-22 NOTE — PROGRESS NOTES
Comprehensive Nutrition Assessment    Type and Reason for Visit:  Initial, Positive Nutrition Screen (wound)    Nutrition Recommendations/Plan:   Diet per Dr. Jennifer Mckeon declines ONS. Added to Dr diet order \" OK for 2 protein choices/meal\". Per pt has been DB for 20 years, A1c 8.3%. Encouraged glucose control & lean protein choices. As pt tolerates, recommend MVI. Recommend probiotic. Pt dislikes yogurt. lactobacillus ordered. Nutrition Assessment:    Pt. nutritionally compromised AEB wounds. At risk for further nutrition compromise r/t  Admitted with wound infection ,increased nutrient needs for wound healing,current A1c 8,3% underlying medical condition ( DM, bipolar disorder, liver dx, HTN , pancreatic insuffiencey, craniotomy, esophageal dilatation, Vitamin D deficiency ). Nutrition recommendations/interventions as per above. Malnutrition Assessment:  Malnutrition Status:  No malnutrition    Context:  Acute Illness     Findings of the 6 clinical characteristics of malnutrition:  Energy Intake:  No significant decrease in energy intake  Weight Loss:  No significant weight loss     Body Fat Loss:  No significant body fat loss     Muscle Mass Loss:  No significant muscle mass loss    Fluid Accumulation:  No significant fluid accumulation     Strength:  Normal  strength    Estimated Daily Nutrient Needs:  Energy (kcal):  ~1992 kcals ( 18); Weight Used for Energy Requirements:  Admission     Protein (g):  ~130-173 grams ( 1.5-2); Weight Used for Protein Requirements:  Ideal        Fluid (ml/day):  per Dr;         Nutrition Related Findings:     Pt seen with wife who is very supportive & knowledgeable. Pt reports pain is not quite under control, but they are adjusting, gets nauseated at times at home , but does med for, ate all of breakfast to include scrambled eggs, sausage & hash browns, does not want ONS  & discussed  lean protein goods & CHO control. Pt reports bm yesterday & bms are no problem.  Pt notes does not want yogurt. Pt reports plan debridement tomorrow. Pt reports no swallowing difficulty. 7/20. A1c 8.3%. meds include  vancomycin, keppra, lactobacillus, humalog, lantus, phenergan. Wounds:   (pt with open area on left heal. abscess open area to right lower leg)       Current Nutrition Therapies:    ADULT DIET; Regular; 3 carb choices (45 gm/meal)    Anthropometric Measures:  · Height: 6' 2\" (188 cm)  · Current Body Weight: 244 lb 0.8 oz (110.7 kg) (7/21)   · Admission Body Weight: 244 lb 0.8 oz (110.7 kg) (7/21 ( edema not documented))    · Usual Body Weight: 227 lb 1.2 oz (103 kg) (per EMR 1/15/21. Per Pt #)     · Ideal Body Weight: 190 lbs;      · BMI: 31.3  · BMI Categories: Obese Class 1 (BMI 30.0-34. 9)       Nutrition Diagnosis:   · Increased nutrient needs related to  (pt with open area on left heal. abscess open area to right lower leg) as evidenced by wounds      Nutrition Interventions:   Food and/or Nutrient Delivery:   (Diet per Dr. pt declines ONS. Recommend 2 protein choices per meal)  Nutrition Education/Counseling:  Education initiated (7/22 per pt has been DB for 20 years, A1c 8.3%. Encouraged glucose control & lean protein choices)   Coordination of Nutrition Care:  Continue to monitor while inpatient    Goals:  75% or more of lean protein CHO contolled diet to assist with wound healing. Nutrition Monitoring and Evaluation:   Behavioral-Environmental Outcomes:  None Identified   Food/Nutrient Intake Outcomes:  Food and Nutrient Intake  Physical Signs/Symptoms Outcomes:  Biochemical Data, Chewing or Swallowing, Nausea or Vomiting, Fluid Status or Edema, Hemodynamic Status, Nutrition Focused Physical Findings, Skin, Weight     Discharge Planning:     Too soon to determine     Electronically signed by Dakotah Huerta RD, LD on 7/22/21 at 12:01 PM EDT    Contact: [phone]

## 2021-07-23 LAB
AEROBIC CULTURE: ABNORMAL
ANAEROBIC CULTURE: ABNORMAL
ANION GAP SERPL CALCULATED.3IONS-SCNC: 9 MEQ/L (ref 8–16)
BUN BLDV-MCNC: 5 MG/DL (ref 7–22)
CALCIUM SERPL-MCNC: 8.8 MG/DL (ref 8.5–10.5)
CHLORIDE BLD-SCNC: 104 MEQ/L (ref 98–111)
CO2: 29 MEQ/L (ref 23–33)
CREAT SERPL-MCNC: 0.7 MG/DL (ref 0.4–1.2)
ERYTHROCYTE [DISTWIDTH] IN BLOOD BY AUTOMATED COUNT: 12.6 % (ref 11.5–14.5)
ERYTHROCYTE [DISTWIDTH] IN BLOOD BY AUTOMATED COUNT: 41.6 FL (ref 35–45)
GFR SERPL CREATININE-BSD FRML MDRD: > 90 ML/MIN/1.73M2
GLUCOSE BLD-MCNC: 135 MG/DL (ref 70–108)
GLUCOSE BLD-MCNC: 157 MG/DL (ref 70–108)
GLUCOSE BLD-MCNC: 166 MG/DL (ref 70–108)
GLUCOSE BLD-MCNC: 172 MG/DL (ref 70–108)
GLUCOSE BLD-MCNC: 183 MG/DL (ref 70–108)
GRAM STAIN RESULT: ABNORMAL
HCT VFR BLD CALC: 39.6 % (ref 42–52)
HEMOGLOBIN: 13.6 GM/DL (ref 14–18)
MCH RBC QN AUTO: 31 PG (ref 26–33)
MCHC RBC AUTO-ENTMCNC: 34.3 GM/DL (ref 32.2–35.5)
MCV RBC AUTO: 90.2 FL (ref 80–94)
ORGANISM: ABNORMAL
PLATELET # BLD: 198 THOU/MM3 (ref 130–400)
PMV BLD AUTO: 10.5 FL (ref 9.4–12.4)
POTASSIUM REFLEX MAGNESIUM: 4.4 MEQ/L (ref 3.5–5.2)
RBC # BLD: 4.39 MILL/MM3 (ref 4.7–6.1)
SODIUM BLD-SCNC: 142 MEQ/L (ref 135–145)
VANCOMYCIN RANDOM: 12.4 UG/ML (ref 0.1–39.9)
WBC # BLD: 6.9 THOU/MM3 (ref 4.8–10.8)

## 2021-07-23 PROCEDURE — 85027 COMPLETE CBC AUTOMATED: CPT

## 2021-07-23 PROCEDURE — 2500000003 HC RX 250 WO HCPCS: Performed by: STUDENT IN AN ORGANIZED HEALTH CARE EDUCATION/TRAINING PROGRAM

## 2021-07-23 PROCEDURE — 2580000003 HC RX 258

## 2021-07-23 PROCEDURE — 6360000002 HC RX W HCPCS

## 2021-07-23 PROCEDURE — 80202 ASSAY OF VANCOMYCIN: CPT

## 2021-07-23 PROCEDURE — 6370000000 HC RX 637 (ALT 250 FOR IP): Performed by: INTERNAL MEDICINE

## 2021-07-23 PROCEDURE — 80048 BASIC METABOLIC PNL TOTAL CA: CPT

## 2021-07-23 PROCEDURE — 6370000000 HC RX 637 (ALT 250 FOR IP): Performed by: PHYSICIAN ASSISTANT

## 2021-07-23 PROCEDURE — 99232 SBSQ HOSP IP/OBS MODERATE 35: CPT | Performed by: PHYSICIAN ASSISTANT

## 2021-07-23 PROCEDURE — 36415 COLL VENOUS BLD VENIPUNCTURE: CPT

## 2021-07-23 PROCEDURE — 0J9P0ZZ DRAINAGE OF LEFT LOWER LEG SUBCUTANEOUS TISSUE AND FASCIA, OPEN APPROACH: ICD-10-PCS | Performed by: PODIATRIST

## 2021-07-23 PROCEDURE — 82948 REAGENT STRIP/BLOOD GLUCOSE: CPT

## 2021-07-23 PROCEDURE — 6370000000 HC RX 637 (ALT 250 FOR IP): Performed by: STUDENT IN AN ORGANIZED HEALTH CARE EDUCATION/TRAINING PROGRAM

## 2021-07-23 PROCEDURE — 1200000000 HC SEMI PRIVATE

## 2021-07-23 PROCEDURE — 2580000003 HC RX 258: Performed by: INTERNAL MEDICINE

## 2021-07-23 PROCEDURE — 6360000002 HC RX W HCPCS: Performed by: STUDENT IN AN ORGANIZED HEALTH CARE EDUCATION/TRAINING PROGRAM

## 2021-07-23 RX ORDER — LIDOCAINE HYDROCHLORIDE AND EPINEPHRINE 10; 10 MG/ML; UG/ML
20 INJECTION, SOLUTION INFILTRATION; PERINEURAL ONCE
Status: COMPLETED | OUTPATIENT
Start: 2021-07-23 | End: 2021-07-23

## 2021-07-23 RX ADMIN — OXYCODONE AND ACETAMINOPHEN 2 TABLET: 5; 325 TABLET ORAL at 10:13

## 2021-07-23 RX ADMIN — SODIUM CHLORIDE, POTASSIUM CHLORIDE, SODIUM LACTATE AND CALCIUM CHLORIDE: 600; 310; 30; 20 INJECTION, SOLUTION INTRAVENOUS at 10:13

## 2021-07-23 RX ADMIN — METOPROLOL TARTRATE 75 MG: 25 TABLET, FILM COATED ORAL at 21:11

## 2021-07-23 RX ADMIN — LEVETIRACETAM 2000 MG: 500 TABLET, FILM COATED ORAL at 21:10

## 2021-07-23 RX ADMIN — GABAPENTIN 600 MG: 600 TABLET, FILM COATED ORAL at 21:11

## 2021-07-23 RX ADMIN — INSULIN LISPRO 1 UNITS: 100 INJECTION, SOLUTION INTRAVENOUS; SUBCUTANEOUS at 21:14

## 2021-07-23 RX ADMIN — ZONISAMIDE 500 MG: 100 CAPSULE ORAL at 21:11

## 2021-07-23 RX ADMIN — ROPINIROLE HYDROCHLORIDE 1 MG: 1 TABLET, FILM COATED ORAL at 12:11

## 2021-07-23 RX ADMIN — DULOXETINE HYDROCHLORIDE 60 MG: 60 CAPSULE, DELAYED RELEASE ORAL at 09:12

## 2021-07-23 RX ADMIN — SODIUM CHLORIDE, POTASSIUM CHLORIDE, SODIUM LACTATE AND CALCIUM CHLORIDE: 600; 310; 30; 20 INJECTION, SOLUTION INTRAVENOUS at 01:09

## 2021-07-23 RX ADMIN — OXYCODONE AND ACETAMINOPHEN 2 TABLET: 5; 325 TABLET ORAL at 21:30

## 2021-07-23 RX ADMIN — OXYCODONE AND ACETAMINOPHEN 2 TABLET: 5; 325 TABLET ORAL at 16:43

## 2021-07-23 RX ADMIN — OXYCODONE AND ACETAMINOPHEN 2 TABLET: 5; 325 TABLET ORAL at 01:09

## 2021-07-23 RX ADMIN — METOPROLOL TARTRATE 75 MG: 25 TABLET, FILM COATED ORAL at 12:11

## 2021-07-23 RX ADMIN — Medication 1250 MG: at 16:45

## 2021-07-23 RX ADMIN — ACETAMINOPHEN 650 MG: 325 TABLET ORAL at 12:12

## 2021-07-23 RX ADMIN — LACOSAMIDE 200 MG: 50 TABLET, FILM COATED ORAL at 09:12

## 2021-07-23 RX ADMIN — GABAPENTIN 600 MG: 600 TABLET, FILM COATED ORAL at 09:12

## 2021-07-23 RX ADMIN — GABAPENTIN 600 MG: 600 TABLET, FILM COATED ORAL at 16:44

## 2021-07-23 RX ADMIN — DIVALPROEX SODIUM 500 MG: 500 TABLET, DELAYED RELEASE ORAL at 09:12

## 2021-07-23 RX ADMIN — PANTOPRAZOLE SODIUM 40 MG: 40 TABLET, DELAYED RELEASE ORAL at 16:44

## 2021-07-23 RX ADMIN — Medication 1250 MG: at 22:08

## 2021-07-23 RX ADMIN — MORPHINE SULFATE 2 MG: 2 INJECTION, SOLUTION INTRAMUSCULAR; INTRAVENOUS at 07:41

## 2021-07-23 RX ADMIN — Medication 1 CAPSULE: at 12:11

## 2021-07-23 RX ADMIN — INSULIN GLARGINE 75 UNITS: 100 INJECTION, SOLUTION SUBCUTANEOUS at 21:14

## 2021-07-23 RX ADMIN — Medication 1250 MG: at 05:55

## 2021-07-23 RX ADMIN — LIDOCAINE HYDROCHLORIDE AND EPINEPHRINE 20 ML: 10; 10 INJECTION, SOLUTION INFILTRATION; PERINEURAL at 09:19

## 2021-07-23 RX ADMIN — LACOSAMIDE 200 MG: 50 TABLET, FILM COATED ORAL at 21:11

## 2021-07-23 RX ADMIN — PANTOPRAZOLE SODIUM 40 MG: 40 TABLET, DELAYED RELEASE ORAL at 05:55

## 2021-07-23 RX ADMIN — DIVALPROEX SODIUM 500 MG: 500 TABLET, DELAYED RELEASE ORAL at 21:10

## 2021-07-23 RX ADMIN — OXYCODONE AND ACETAMINOPHEN 2 TABLET: 5; 325 TABLET ORAL at 05:55

## 2021-07-23 RX ADMIN — ROPINIROLE HYDROCHLORIDE 1 MG: 1 TABLET, FILM COATED ORAL at 16:43

## 2021-07-23 RX ADMIN — GABAPENTIN 600 MG: 600 TABLET, FILM COATED ORAL at 12:11

## 2021-07-23 RX ADMIN — INSULIN GLARGINE 75 UNITS: 100 INJECTION, SOLUTION SUBCUTANEOUS at 09:12

## 2021-07-23 RX ADMIN — LEVETIRACETAM 2000 MG: 500 TABLET, FILM COATED ORAL at 09:12

## 2021-07-23 RX ADMIN — ROPINIROLE HYDROCHLORIDE 1 MG: 1 TABLET, FILM COATED ORAL at 21:10

## 2021-07-23 ASSESSMENT — PAIN SCALES - GENERAL
PAINLEVEL_OUTOF10: 7
PAINLEVEL_OUTOF10: 10
PAINLEVEL_OUTOF10: 0
PAINLEVEL_OUTOF10: 9
PAINLEVEL_OUTOF10: 8
PAINLEVEL_OUTOF10: 9
PAINLEVEL_OUTOF10: 9
PAINLEVEL_OUTOF10: 8
PAINLEVEL_OUTOF10: 9

## 2021-07-23 NOTE — PLAN OF CARE
Problem: Pain:  Goal: Pain level will decrease  Description: Pain level will decrease  Outcome: Ongoing  Note: Pain has not decreased     Problem: Pain:  Goal: Control of acute pain  Description: Control of acute pain  Outcome: Ongoing  Note: Acute pain is high at times     Problem: Pain:  Goal: Control of chronic pain  Description: Control of chronic pain  Outcome: Ongoing  Note: Chronic pain is not managed     Problem: Falls - Risk of:  Goal: Will remain free from falls  Description: Will remain free from falls  Outcome: Ongoing  Note: No falls this shift      Problem: Falls - Risk of:  Goal: Absence of physical injury  Description: Absence of physical injury  Outcome: Ongoing  Note: No physical injury this shift     Problem: Discharge Planning:  Goal: Discharged to appropriate level of care  Description: Discharged to appropriate level of care  Outcome: Ongoing  Note: Will be discharged to appropriate level of care     Problem: Serum Glucose Level - Abnormal:  Goal: Ability to maintain appropriate glucose levels will improve  Description: Ability to maintain appropriate glucose levels will improve  Outcome: Ongoing  Note: His glucose levels can be high     Problem: Sensory Perception - Impaired:  Goal: Ability to maintain a stable neurologic state will improve  Description: Ability to maintain a stable neurologic state will improve  Outcome: Ongoing  Note: Able to maintain a stable neuroliog state     Problem: Skin Integrity:  Goal: Will show no infection signs and symptoms  Description: Will show no infection signs and symptoms  Note: No symptoms of infection     Problem: Skin Integrity:  Goal: Absence of new skin breakdown  Description: Absence of new skin breakdown  Outcome: Ongoing  Note: Monitoring skin to help prevent skin breakdwon     Problem: DISCHARGE BARRIERS  Goal: Patient's continuum of care needs are met  Outcome: Ongoing  Note: Patient's continuum of care needs being met     Problem: Nutrition  Goal: Optimal nutrition therapy  7/22/2021 2130 by Rani Westbrook RN  Outcome: Ongoing  Note: Working toward optimal nutrition  7/22/2021 1135 by Anisa Stokes RD, LD  Outcome: Ongoing

## 2021-07-23 NOTE — DISCHARGE INSTR - ACTIVITY
· Rest when you feel tired. · Remain non weight bearing to left lower leg. · Get some physical activity every day, but do not get too tired.

## 2021-07-23 NOTE — DISCHARGE INSTR - DIET
 Good nutrition is important when healing from an illness, injury, or surgery. Follow any nutrition recommendations given to you during your hospital stay.  If you were given an oral nutrition supplement while in the hospital, continue to take this supplement at home. You can take it with meals, in-between meals, and/or before bedtime. These supplements can be purchased at most local grocery stores, pharmacies, and chain super-stores.  If you have any questions about your diet or nutrition, call the hospital and ask for the dietitian. Continue to check blood sugars before meals and at bedtime. Its a good idea to keep a log that includes date, time and result of blood sugar to share with your PCP so medication adjustments can be made if needed. · Do not skip meals. Eating a balanced diet may increase your energy level.

## 2021-07-23 NOTE — PROGRESS NOTES
Hospitalist Progress Note      Patient:  Viet John    Unit/Bed:7K-19/019-A  YOB: 1970  MRN: [id number]   Acct: [de-identified]   PCP: Rl Reeves MD  Date of Admission: 7/20/2021    Assessment/Plan:    1. Multiple Wounds: Cellulitis with Abscess, Left heel & Diabetic Ulcers, RLE: Podiatry & ID consulted. Continue IV ABX. Following cultures, blood cultures are NGTD. Pt is afebrile and no leukocytosis. Norco changed to Q6H from [facility] for better pain control. Podiatry bedside debridement on 7/23. Pt will be prescribed PO ABX at [facility]. 2. IDDM, uncontrolled: HgbA1c 8.3, BS 150s. Insulin regimen: Lantus 75 units BID + Humalog 10 units + SSI. Humalog 10 was not restarted on admission, will continue to monitor with this addition. 3. Seizure DO: Continue home medications. 4. Essential HTN: BP is stable. Continue home medications. 5. HLD: Statin. Chief Complaint: Leg Wounds     Initial H and P:-    Initial H&P \"Patient was sent here after a appointment with his primary care physician. He has been having issues with nonhealing lower extremity wounds. He has been trying to keep the wounds clean with soap and water Hibiclens and an biotic ointment. However the wounds are getting worse. Has a history of MRSA as well. He also has noted that his blood sugars have been significantly elevated as of late. He states he also has not ate or drink very well recently. He is brought to the ER and seen by podiatry who performed some debridement and otherwise he is being admitted for IV antibiotics for the treatment of these wounds. \"     7/21: No acute events overnight. Pt states that his pain is not controlled at all. Podiatry consulted and awaiting surgical decision. ID consulted today. 7/22: No acute events overnight. Pt states that her pain is relatively controlled. Pt states that his BS is high due to his insulin being late.      Subjective (past 24 hours):   No acute events overnight. Bedside I&D by podiatry. Podiatry is thinking discharge tomorrow after looking at debridement. Glucose is much more controlled. Past medical history, family history, social history and allergies reviewed again and is unchanged since admission. ROS Pt denies CP, SOB, HA, abd pain, diarrhea. Pt admits to leg pain. Medications:  Reviewed    Infusion Medications    sodium chloride 25 mL (07/20/21 1936)    dextrose      lactated ringers 125 mL/hr at 07/23/21 0109     Scheduled Medications    vancomycin  1,250 mg Intravenous Q8H    lactobacillus  1 capsule Oral Daily with breakfast    insulin lispro  10 Units Subcutaneous TID WC    bupivacaine (PF)  30 mL Intradermal Once    divalproex  500 mg Oral BID    DULoxetine  60 mg Oral Daily    empagliflozin  25 mg Oral Daily    gabapentin  600 mg Oral 4x Daily    insulin glargine  75 Units Subcutaneous BID    lacosamide  200 mg Oral BID    levETIRAcetam  2,000 mg Oral BID    metoprolol tartrate  75 mg Oral BID    pantoprazole  40 mg Oral BID    rOPINIRole  1 mg Oral TID    zonisamide  500 mg Oral Nightly    sodium chloride flush  5-40 mL Intravenous 2 times per day    insulin lispro  0-12 Units Subcutaneous TID WC    insulin lispro  0-6 Units Subcutaneous Nightly    vancomycin (VANCOCIN) intermittent dosing (placeholder)   Other RX Placeholder     PRN Meds: oxyCODONE-acetaminophen **OR** oxyCODONE-acetaminophen, morphine, promethazine, sodium chloride flush, sodium chloride, acetaminophen **OR** acetaminophen, glucose, dextrose, glucagon (rDNA), dextrose      Intake/Output Summary (Last 24 hours) at 7/23/2021 0952  Last data filed at 7/22/2021 2112  Gross per 24 hour   Intake 4488.73 ml   Output 325 ml   Net 4163.73 ml       Diet:  ADULT DIET;  Regular; 3 carb choices (45 gm/meal)    Exam:  /77   Pulse 71   Temp 97.9 °F (36.6 °C)   Resp 18   Ht 6' 2\" (1.88 m)   Wt 244 lb 1.6 oz (110.7 kg)   SpO2 97%   BMI 31.34 kg/m²   General appearance: No apparent distress, appears stated age and cooperative. HEENT: Pupils equal, round, and reactive to light. Conjunctivae/corneas clear. Neck: Supple, with full range of motion. No jugular venous distention. Trachea midline. Respiratory:  Normal respiratory effort on RA. Clear to auscultation, bilaterally without Rales/Wheezes/Rhonchi. Cardiovascular: Regular rate and rhythm with normal S1/S2 without murmurs, rubs or gallops. Abdomen: Soft, non-tender, non-distended with normal bowel sounds. Musculoskeletal: passive and active ROM x 4 extremities. Skin: Left heel wound. RLE multiple wounds on shin. Neurologic:  Neurovascularly intact without any focal sensory/motor deficits. Cranial nerves: II-XII intact, grossly non-focal.  Psychiatric: Alert and oriented, thought content appropriate, normal insight  Capillary Refill: Brisk,< 3 seconds   Peripheral Pulses: +2 palpable, equal bilaterally     Labs:   Recent Labs     07/21/21  0602 07/22/21  0541 07/23/21  0558   WBC 8.5 7.9 6.9   HGB 14.0 12.6* 13.6*   HCT 40.1* 36.0* 39.6*    187 198     Recent Labs     07/21/21  0602 07/22/21  0541 07/23/21  0558    140 142   K 4.2 3.7 4.4    103 104   CO2 25 28 29   BUN 8 6* 5*   CREATININE 0.9 0.7 0.7   CALCIUM 8.5 8.2* 8.8     Recent Labs     07/20/21  1640   AST 12   ALT 16   BILITOT 0.9   ALKPHOS 107     Microbiology:    Blood culture #1:   Lab Results   Component Value Date    BC No growth-preliminary  07/20/2021       Lab Results   Component Value Date    LABGRAM  07/20/2021     Few segmented neutrophils observed. Rare epithelial cells observed. Many gram positive cocci occurring singly and in pairs.       Urinalysis:      Lab Results   Component Value Date    NITRU NEGATIVE 02/08/2021    [address] NONE 08/12/2019    WBCUA 6-10 10/17/2015    BACTERIA NONE 08/12/2019    RBCUA NONE 08/12/2019    BLOODU NEGATIVE 02/08/2021    SPECGRAV >1.030 08/12/2019 GLUCOSEU >= 1000 02/08/2021       Radiology:  MRI ANKLE LEFT W WO CONTRAST   Final Result       1. Increased signal intensity in the plantar soft tissues over the calcaneus which may represent a clinically described ulcer. No definite evidence of osteomyelitis in the underlying calcaneus. 2. Slightly increased fluid in the distal tibialis posterior tendon and along the peroneus brevis and longus tendon sheaths. 3. Increased signal intensity in the interosseous ligament between the talus and calcaneus which may be torn. 4. Abnormal signal intensity in the muscles along the medial aspect of the hindfoot. This may represent a tear of the abductor hallucis muscle belly. 5. Increased signal intensity in the flexor digitorum brevis muscle belly which may represent a partial tear. 6. Thickening at the attachment of the plantar fascia upon the calcaneus. 7. Small osteochondral defect in the medial talar dome. 8. Areas of abnormal signal intensity in the anterior process of the calcaneus, base of cuboid bone and tip of lateral malleolus suggestive of degenerative changes. 9. Increased signal intensity in the subcutaneous soft tissues over the medial and lateral aspects of the hindfoot consistent with edema and/or cellulitis. 10. Fluid collection along the talonavicular joint. **This report has been created using voice recognition software. It may contain minor errors which are inherent in voice recognition technology. **      Final report electronically signed by DR Nichelle Robles on 7/21/2021 3:25 PM      CT TIBIA FIBULA LEFT WO CONTRAST   Final Result   1. Degenerative changes of the left knee. Postsurgical changes left knee. Multiple varicosities this left calf. 2. Study otherwise unremarkable. No tibial or fibular fracture is seen. **This report has been created using voice recognition software. It may contain minor errors which are inherent in voice recognition technology. ** Final report electronically signed by Dr. Elidia Noonan on 7/20/2021 9:07 PM      XR FOOT LEFT (MIN 3 VIEWS)   Final Result   Plantar calcaneal spur. Otherwise normal left foot. **This report has been created using voice recognition software. It may contain minor errors which are inherent in voice recognition technology. **      Final report electronically signed by Dr. Elidia Noonan on 7/20/2021 3:57 PM        Electronically signed by Saintclair Lineman, PA on 7/23/2021 at 9:52 AM

## 2021-07-23 NOTE — PROGRESS NOTES
Patient had bedside ID this morning with podiatry. Tolerated well. Does offer intermittent complaints of pain and medicated as per order. Patient alert and oriented.

## 2021-07-23 NOTE — DISCHARGE INSTR - PHARMACY
· Be safe with medicines. If your doctor prescribed medicine, take it exactly as prescribed. Call your doctor if you think you are having a problem with your medicine. You will get more details on the specific medicines your doctor prescribes. Unused medications, especially pain medications, should be disposed to ensure medications do not end up being misused in the future, as well as helping to ensure drugs are not impacting the environment. [facility] and many local police agencies have medication take-back bins to properly destroy these medications. Please see the site https://apps. [employer]iversion. [place].gov/pubdispsearch/spring/main?execution=e2s1 for additional take-back bins located in your area.

## 2021-07-23 NOTE — CARE COORDINATION
7/23/21, 12:14 PM EDT    DISCHARGE ON GOING [address] day: 3  Location: -19/019-A Reason for admit: Wound infection [T14. 8XXA, L08.9]   Procedure: 7/23/21 Bedside debridement per Podiatry  Barriers to Discharge: (To ED with severe pain 10/10 left heel, hx diabetic, abdominal pain, hx seizure disorder on Vimpat)   Podiatry consulted, hospitalist primary, N/V checks, diabetic mgmt, ID consulted with patient currently on IV Vancomycin and Zosyn, follow blood cultures, dressing care. Pain control  PCP: Elmer Gorman MD  Readmission Risk Score: 15%  Patient Goals/Plan/Treatment Preferences: Planning home tomorrow with wife, who is an RN. She will do wound care. Denied need for [facility]. Dr Leila Patiño noted plan: will discharged with oral Bactrim for 2 weeks    Planning weekend discharge:   7/23/21, 12:19 PM EDT    Patient goals/plan/ treatment preferences discussed by  and . Patient goals/plan/ treatment preferences reviewed with patient/ family. Patient/ family verbalize understanding of discharge plan and are in agreement with goal/plan/treatment preferences. Understanding was demonstrated using the teach back method. AVS provided by RN at time of discharge, which includes all necessary medical information pertaining to the patients current course of illness, treatment, post-discharge goals of care, and treatment preferences.         IMM Letter  IMM Letter given to Patient/Family/Significant other/Guardian/POA/by[de-identified] cm  IMM Letter date given[de-identified] 07/23/21  IMM Letter time given[de-identified] 0900

## 2021-07-23 NOTE — PROGRESS NOTES
Progress note: Infectious diseases    Patient - Riley Strickland,  Age - 46 y.o.    - 1970      Room Number - 3O-43/050-G   MRN -  [id number]   Acct # - [de-identified]  Date of Admission -  2021  3:31 PM    SUBJECTIVE:   No new complaints. OBJECTIVE   VITALS    height is 6' 2\" (1.88 m) and weight is 244 lb 1.6 oz (110.7 kg). His temperature is 97.9 °F (36.6 °C). His blood pressure is 120/77 and his pulse is 71. His respiration is 18 and oxygen saturation is 97%.        Wt Readings from Last 3 Encounters:   21 244 lb 1.6 oz (110.7 kg)   21 254 lb (115.2 kg)   21 249 lb 3.2 oz (113 kg)       I/O (24 Hours)    Intake/Output Summary (Last 24 hours) at 2021 1147  Last data filed at 2021 2112  Gross per 24 hour   Intake 4488.73 ml   Output 325 ml   Net 4163.73 ml       General Appearance  Awake, alert, oriented,  not  In acute distress  HEENT - normocephalic, atraumatic, pink conjunctiva,  anicteric sclera  Neck - Supple, no mass  Lungs -  Bilateral good air entry, no rhonchi, no wheeze  Cardiovascular - Heart sounds are normal.     Abdomen - soft, not distended, nontender,   Neurologic -oriented to person place and time  Skin -scabbed wound on his right lower shin  , he had dressed the left heel wound   extremities - No edema, no cyanosis, clubbing     MEDICATIONS:      vancomycin  1,250 mg Intravenous Q8H    lactobacillus  1 capsule Oral Daily with breakfast    insulin lispro  10 Units Subcutaneous TID WC    bupivacaine (PF)  30 mL Intradermal Once    divalproex  500 mg Oral BID    DULoxetine  60 mg Oral Daily    empagliflozin  25 mg Oral Daily    gabapentin  600 mg Oral 4x Daily    insulin glargine  75 Units Subcutaneous BID    lacosamide  200 mg Oral BID    levETIRAcetam  2,000 mg Oral BID    metoprolol tartrate  75 mg Oral BID    pantoprazole  40 mg Oral BID    rOPINIRole  1 mg Oral TID    zonisamide  500 mg Oral Nightly    sodium chloride flush  5-40 mL Intravenous 2 times per day    insulin lispro  0-12 Units Subcutaneous TID WC    insulin lispro  0-6 Units Subcutaneous Nightly    vancomycin (VANCOCIN) intermittent dosing (placeholder)   Other RX Placeholder      sodium chloride 25 mL (07/20/21 1936)    dextrose      lactated ringers 125 mL/hr at 07/23/21 1013     oxyCODONE-acetaminophen **OR** oxyCODONE-acetaminophen, morphine, promethazine, sodium chloride flush, sodium chloride, acetaminophen **OR** acetaminophen, glucose, dextrose, glucagon (rDNA), dextrose      LABS:     CBC:   Recent Labs     07/21/21  0602 07/22/21  0541 07/23/21  0558   WBC 8.5 7.9 6.9   HGB 14.0 12.6* 13.6*    187 198     BMP:    Recent Labs     07/21/21  0602 07/22/21  0541 07/23/21  0558    140 142   K 4.2 3.7 4.4    103 104   CO2 25 28 29   BUN 8 6* 5*   CREATININE 0.9 0.7 0.7   GLUCOSE 379* 274* 135*     Calcium:  Recent Labs     07/23/21  0558   CALCIUM 8.8      Recent Labs     07/22/21  2116 07/23/21  0620 07/23/21  1049   POCGLU 171* 157* 183*     HgbA1C:   Recent Labs     07/20/21  1630   LABA1C 8.3*     INR: No results for input(s): INR in the last 72 hours. Hepatic:   Recent Labs     07/20/21  1640   ALKPHOS 107   ALT 16   AST 12   PROT 6.8   BILITOT 0.9   LABALBU 4.3        CULTURES:   UA: No results for input(s): SPECGRAV, PHUR, COLORU, CLARITYU, MUCUS, PROTEINU, BLOODU, RBCUA, WBCUA, BACTERIA, NITRU, GLUCOSEU, BILIRUBINUR, UROBILINOGEN, KETUA, LABCAST, LABCASTTY, AMORPHOS in the last 72 hours.     Invalid input(s): CRYSTALS  Micro:   Lab Results   Component Value Date    BC No growth-preliminary  07/20/2021        Problem list of patient:     Patient Active Problem List   Diagnosis Code    Depression F32.9    Liver disease K76.9    Restless legs syndrome G25.81    Chronic back pain M54.9, G89.29    Other chest pain R07.89    Irritable bowel syndrome K58.9    Non compliance w medication regimen Z91.14    Hypertension associated with diabetes ([facility] E11.59, I15.2    Hyperlipidemia with target LDL less than 100 E78.5    IBS (irritable bowel syndrome) K58.9    Sinus tachycardia R00.0    Confusion R41.0    Encephalopathy G93.40    Diabetes mellitus type II, uncontrolled ([facility]) E11.65    Dizziness R42    Neuropathy G62.9    Hyperammonemia ([facility]) E72.20    Medication overdose, insulin T50.901A    Suicide attempt ([facility]) T14.91XA    Hypoglycemia E16.2    Hypokalemia E87.6    Lithium toxicity T56.891A    Nausea & vomiting R11.2    Snoring R06.83    Hypogonadism [id number]    Erectile dysfunction due to diseases classified elsewhere N52.1    Rash R21    Headaches due to old head injury G44.309, S09.90XS    Abdominal wall pain in left lower quadrant R10.32    Bipolar affective disorder ([facility] F31.9    Hypogonadism in male E29.1    Type 2 diabetes mellitus with diabetic neuropathy ([facility]) E11.40    Hypomagnesemia E83.42    Partial seizure ([facility] R56.9    Syncope and collapse R55    Headache disorder R51.9    Partial symptomatic epilepsy with complex partial seizures, not intractable, without status epilepticus ([facility]) G40.209    Respiratory acidosis E87.2    Essential hypertension I10    Gastritis K29.70    Gastroesophageal reflux disease K21.9    Mixed hyperlipidemia E78.2    Acute lateral meniscus tear of right knee S83.281A    Recurrent right inguinal hernia K40.91    Bicipital tendinitis of right shoulder M75.21    Carpal tunnel syndrome of right wrist G56.01    Obesity (BMI 30-39. 9) E66.9    Lump of axilla R22.30    Familial hypercholesterolemia E78.01    Coronary artery disease of native artery of native heart with stable angina pectoris ([facility]) I25.118    Exertional dyspnea R06.00    Vitamin D deficiency E55.9    Seizures ([facility]) R56.9    Nausea and vomiting R11.2    Type II diabetes mellitus with manifestations, uncontrolled ([facility]) E11.8, E11.65    Chondromalacia of knee, right M94.261    Effusion of left knee M25.462    Elevated levels of transaminase & lactic acid dehydrogenase R74.01, R74.02    Other intervertebral disc degeneration, lumbar region M51.36    Palpitations R00.2    Splenomegaly R16.1    Sprain of right foot S93.601A    Steatohepatitis K75.81    Osteopenia M85.80    Obstructive sleep apnea D85.67    Follicular neoplasm of thyroid 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 8XXA, L08.9         ASSESSMENT/PLAN   Soft tissue infection of the left heel and right lower leg due to MRSA  Continue current treatment.      We will discharged with oral Bactrim for 2 weeks  Sarai Chatterjee MD, MD, [facility] 7/23/2021 11:47 AM

## 2021-07-23 NOTE — PLAN OF CARE
Problem: Pain:  Goal: Pain level will decrease  Description: Pain level will decrease  7/23/2021 0021 by Wes Holland RN  Outcome: Ongoing  7/22/2021 2130 by Jeb Soriano RN  Outcome: Ongoing  Note: Pain has not decreased  Goal: Control of acute pain  Description: Control of acute pain  7/23/2021 0021 by Wes Holland RN  Outcome: Ongoing  7/22/2021 2130 by Jeb Soriano RN  Outcome: Ongoing  Note: Acute pain is high at times  Goal: Control of chronic pain  Description: Control of chronic pain  7/23/2021 0021 by Wes Holland RN  Outcome: Ongoing  7/22/2021 2130 by Jeb Soriano RN  Outcome: Ongoing  Note: Chronic pain is not managed     Problem: Falls - Risk of:  Goal: Will remain free from falls  Description: Will remain free from falls  7/23/2021 0021 by Wes Holland RN  Outcome: Ongoing  7/22/2021 2130 by Jeb Soriano RN  Outcome: Ongoing  Note: No falls this shift   Goal: Absence of physical injury  Description: Absence of physical injury  7/23/2021 0021 by Wes Holland RN  Outcome: Ongoing  7/22/2021 2130 by Jeb Soriano RN  Outcome: Ongoing  Note: No physical injury this shift     Problem: Discharge Planning:  Goal: Discharged to appropriate level of care  Description: Discharged to appropriate level of care  7/23/2021 0021 by Wes Holland RN  Outcome: Ongoing  7/22/2021 2130 by Jeb Soriano RN  Outcome: Ongoing  Note: Will be discharged to appropriate level of care     Problem: Serum Glucose Level - Abnormal:  Goal: Ability to maintain appropriate glucose levels will improve  Description: Ability to maintain appropriate glucose levels will improve  7/23/2021 0021 by Wes Holland RN  Outcome: Ongoing  7/22/2021 2130 by Jeb Soriano RN  Outcome: Ongoing  Note: His glucose levels can be high     Problem: Sensory Perception - Impaired:  Goal: Ability to maintain a stable neurologic state will improve  Description: Ability to maintain a stable neurologic state will improve  7/23/2021 0021 by Nila Barber RN  Outcome: Ongoing  7/22/2021 2130 by Lev Mehta RN  Outcome: Ongoing  Note: Able to maintain a stable neuroliog state     Problem: Infection - Methicillin-Resistant Staphylococcus Aureus Infection:  Goal: Absence of methicillin-resistant Staphylococcus aureus infection  Description: Absence of methicillin-resistant Staphylococcus aureus infection  Outcome: Ongoing     Problem: Skin Integrity:  Goal: Will show no infection signs and symptoms  Description: Will show no infection signs and symptoms  7/23/2021 0021 by Nila Barber RN  Outcome: Ongoing  7/22/2021 2130 by Lev Mehta RN  Note: No symptoms of infection  Goal: Absence of new skin breakdown  Description: Absence of new skin breakdown  7/23/2021 0021 by Nila Barber RN  Outcome: Ongoing  7/22/2021 2130 by Lev Mehta RN  Outcome: Ongoing  Note: Monitoring skin to help prevent skin breakdwon     Problem: DISCHARGE BARRIERS  Goal: Patient's continuum of care needs are met  7/23/2021 0021 by Nila Barber RN  Outcome: Ongoing  7/22/2021 2130 by Lev Mehta RN  Outcome: Ongoing  Note: Patient's continuum of care needs being met     Problem: Nutrition  Goal: Optimal nutrition therapy  7/23/2021 0021 by Nila Barber RN  Outcome: Ongoing  7/22/2021 2130 by Lev Mehta RN  Outcome: Ongoing  Note: Working toward optimal nutrition  7/22/2021 1135 by Kaur Jordan RD, LD  Outcome: Ongoing

## 2021-07-23 NOTE — PROGRESS NOTES
Podiatric Progress Note  Nasra Geiger  Subjective :   7/23/2021  Patient seen bedside today with Dr. Britany Pate. Plan today is for a bedside incision and drainage of left heel abscess, patient consent form is signed. Patient appeared pleasant, was oriented to person, place and time. Patient relates that he still has significant pain in his left foot. Pain rates 8/10 this morning. Patient states he has a lot of pain with dressing changes. He denies N/V/F/SOB/C/CP or other constitutional symptoms at this time. Patient has no further pedal concerns at this point in time. 7/22/2021  Patient seen bedside today on behalf of Dr. Britany Pate. Patient appeared pleasant, was oriented to person, place and time. Patient relates that he still has significant pain in his left foot. Pain rates 9/10 this morning. Patient states he has a lot of pain with dressing changes. He denies N/V/F/SOB/C/CP or other constitutional symptoms at this time. Patient has no further pedal concerns at this point in time. 7/21/2021  Patient seen bedside today alongside of Dr. Britany Pate. Patient appeared pleasant, was oriented to person, place and time. Patient relates that he still has significant pain in his left foot. Pain is to the inside part of his heel. It is extremely painful with manipulation, and dressing changes are very difficult for him. He states that he received norco for pain earlier this morning, but it has not done anything for him as of yet. Patient endorses chills a couple days ago, but denies any nausea, vomiting, fever, chest pain, shortness of breath. Patient has no further pedal concerns at this point in time.      Current Medications:    Current Facility-Administered Medications: vancomycin (VANCOCIN) 1250 mg in dextrose 5 % 250 mL IVPB, 1,250 mg, Intravenous, Q8H  oxyCODONE-acetaminophen (PERCOCET) 5-325 MG per tablet 1 tablet, 1 tablet, Oral, Q4H PRN **OR** oxyCODONE-acetaminophen (PERCOCET) 5-325 MG per tablet 2 tablet, 2 tablet, Oral, Q4H PRN  lactobacillus (CULTURELLE) capsule 1 capsule, 1 capsule, Oral, Daily with breakfast  insulin lispro (HUMALOG) injection vial 10 Units, 10 Units, Subcutaneous, TID WC  morphine (PF) injection 2 mg, 2 mg, Intravenous, Q4H PRN  bupivacaine (PF) (MARCAINE) 0.5 % injection 150 mg, 30 mL, Intradermal, Once  divalproex (DEPAKOTE) DR tablet 500 mg, 500 mg, Oral, BID  DULoxetine (CYMBALTA) extended release capsule 60 mg, 60 mg, Oral, Daily  empagliflozin (JARDIANCE) tablet TABS 25 mg, 25 mg, Oral, Daily  gabapentin (NEURONTIN) tablet 600 mg, 600 mg, Oral, 4x Daily  insulin glargine (LANTUS) injection vial 75 Units, 75 Units, Subcutaneous, BID  lacosamide (VIMPAT) tablet 200 mg, 200 mg, Oral, BID  levETIRAcetam (KEPPRA) tablet 2,000 mg, 2,000 mg, Oral, BID  metoprolol tartrate (LOPRESSOR) tablet 75 mg, 75 mg, Oral, BID  pantoprazole (PROTONIX) tablet 40 mg, 40 mg, Oral, BID  promethazine (PHENERGAN) tablet 12.5 mg, 12.5 mg, Oral, Q8H PRN  rOPINIRole (REQUIP) tablet 1 mg, 1 mg, Oral, TID  zonisamide (ZONEGRAN) capsule 500 mg, 500 mg, Oral, Nightly  sodium chloride flush 0.9 % injection 5-40 mL, 5-40 mL, Intravenous, 2 times per day  sodium chloride flush 0.9 % injection 5-40 mL, 5-40 mL, Intravenous, PRN  0.9 % sodium chloride infusion, 25 mL, Intravenous, PRN  acetaminophen (TYLENOL) tablet 650 mg, 650 mg, Oral, Q6H PRN **OR** acetaminophen (TYLENOL) suppository 650 mg, 650 mg, Rectal, Q6H PRN  glucose (GLUTOSE) 40 % oral gel 15 g, 15 g, Oral, PRN  dextrose 50 % IV solution, 12.5 g, Intravenous, PRN  glucagon (rDNA) injection 1 mg, 1 mg, Intramuscular, PRN  dextrose 5 % solution, 100 mL/hr, Intravenous, PRN  insulin lispro (HUMALOG) injection vial 0-12 Units, 0-12 Units, Subcutaneous, TID WC  insulin lispro (HUMALOG) injection vial 0-6 Units, 0-6 Units, Subcutaneous, Nightly  lactated ringers infusion, , Intravenous, Continuous  vancomycin (VANCOCIN) intermittent dosing (placeholder), , Other, RX Placeholder    Objective     /77   Pulse 71   Temp 97.9 °F (36.6 °C)   Resp 18   Ht 6' 2\" (1.88 m)   Wt 244 lb 1.6 oz (110.7 kg)   SpO2 97%   BMI 31.34 kg/m²      I/O:    Intake/Output Summary (Last 24 hours) at 7/23/2021 0942  Last data filed at 7/22/2021 2112  Gross per 24 hour   Intake 4488.73 ml   Output 325 ml   Net 4163.73 ml              Wt Readings from Last 3 Encounters:   07/21/21 244 lb 1.6 oz (110.7 kg)   06/28/21 254 lb (115.2 kg)   04/13/21 249 lb 3.2 oz (113 kg)       LABS:    Recent Labs     07/22/21  0541 07/23/21  0558   WBC 7.9 6.9   HGB 12.6* 13.6*   HCT 36.0* 39.6*    198        Recent Labs     07/23/21  0558      K 4.4      CO2 29   BUN 5*   CREATININE 0.7        Recent Labs     07/20/21  1640   PROT 6.8      No results for input(s): CKTOTAL, CKMB, CKMBINDEX, TROPONINI in the last 72 hours. Exam:   Dressing intact and well maintained. No apparent strike through noted. Vascular: Dorsalis pedis and posterior tibial pulses are palpable to right foot, DP is palpable to left. PT non-palpable left. Skin temperature is warm  to cool from proximal tibial tuberosity to distal digits. CFT brisk to exposed digits. Edema noted to left heel and ankle, non-pitting. Hair growth present to bilateral feet. Quality of skin is wnl for patient age. Dermatologic: Open lesion x2 noted to anterior aspect of right leg. The proximal lesion measures 2cm in diameter and displays a fibro-necrotic base with orange exudate present. The more distal lesion measures 3.5 cm in diameter with a necrotic base, no drainage noted. To both lesions, negative probe to bone, negative malodor. On left, patient has open wound to heel. Sanguinous discharge present. Some karin-wound erythema present extending dorsally and medially. Wound probes to bone, does not track to medial foot. Post-debridement, 4cm incision present to left heel, extending proximally along medial aspect of heel. Neurovascular: Light touch sensation grossly absent to bilateral feet, restored at mid calf. Musculoskeletal: Muscle strength 5/5 for all plantarflexors, dorsiflexors, inverters and everters to RLE, 4/5 for all quadrants LLE. Exquisite pain to palpation of left heel, and left ankle to the medial and posterior aspects. Cavus foot type noted bilateral. Semi-rigid contractures of digits 1-5 bilateral noted. No amputations noted bilateral.     Left heel          Right anterior leg      IMAGING:    XR FOOT LEFT (MIN 3 VIEWS)    Result Date: 7/20/2021  PROCEDURE: XR FOOT LEFT (MIN 3 VIEWS) CLINICAL INFORMATION: pain COMPARISON: No prior study. TECHNIQUE: 4 views of the foot were obtained. FINDINGS: No acute fracture or dislocation is seen. There is no soft tissue swelling. There is a moderate-sized plantar calcaneal spur. Plantar calcaneal spur. Otherwise normal left foot. **This report has been created using voice recognition software. It may contain minor errors which are inherent in voice recognition technology. ** Final report electronically signed by Dr. Paul Chow on 7/20/2021 3:57 PM    CT TIBIA FIBULA LEFT WO CONTRAST    Result Date: 7/20/2021  PROCEDURE: NONCONTRAST CT LEFT TIBIA/FIBULA: CLINICAL INFORMATION: Infection/ abscess TECHNIQUE: Multiple axial 3 mm images of the left tibia/fibula were obtained without administration of intravenous contrast material. Computer generated sagittal and coronal images of the left tibia/fibula were also reconstructed. ALL CT SCANS AT THIS FACILITY use dose modulation, iterative reconstruction, and/or weight-based dosing when appropriate to reduce radiation dose to as low as reasonably achievable. FINDINGS: There are bony defects in the distal femur and proximal tibia conjunction with prior anterior crucial ligament repair. No acute fracture is seen. Appears be a small bone cyst in the lateral malleolus. There is mild degenerative spurring of the distal femur. Note that there appear to be multiple superficial varicosities in the distal left calf     1. Degenerative changes of the left knee. Postsurgical changes left knee. Multiple varicosities this left calf. 2. Study otherwise unremarkable. No tibial or fibular fracture is seen. **This report has been created using voice recognition software. It may contain minor errors which are inherent in voice recognition technology. ** Final report electronically signed by Dr. Yamil Suazo on 7/20/2021 9:07 PM    MRI ANKLE LEFT W WO CONTRAST    Result Date: 7/21/2021  PROCEDURE: MRI ANKLE LEFT W WO CONTRAST CLINICAL INFORMATION Wound, left heel, probes to bone, evaluate for abscess, osteomyelitis of left calcaneus COMPARISON: Plain radiographs dated 20 July 2021. CT scan of the left tib-fib dated 20 July 2021. . TECHNIQUE: MRI scan was carried out through the left ankle before and after intravenous administration of 20 mL of ProHance. Nikki Bonner FINDINGS: 1. Tendons: There is slightly increased fluid in the distal tibialis posterior tendon and along the peroneus brevis and longus tendon sheaths. The  Achilles, flexor digitorum longus and flexor hallucis longus tendons appear to be intact 2. Ligaments: There is slight thickening of the posterior talofibular and deltoid ligaments. The anterior talofibular and calcaneofibular ligaments appear to be intact. There is increased signal intensity in the interosseous ligament between the talus and calcaneus which may be torn. 3. Articular structures. There is no significant tibiotalar effusion. There is small osteochondral defect in the medial talar dome. There is a fluid collection along the talonavicular articulation. There is degenerative change involving the calcaneocuboid articulation. 4. Bony structures. The calcaneus appears to be intact. There is no definite evidence of osteomyelitis.  There is abnormal signal intensity involving the anterior process of the calcaneus, base of the cuboid bone and tip of the lateral malleolus suggestive of degenerative changes. 5. Soft tissues. There is abnormal signal intensity in the plantar soft tissues overlying the calcaneus which may represent the clinically described ulcer. There is abnormal signal intensity in the muscles along the medial aspect of the hindfoot this may  represent a partially torn abductor hallucis muscle belly. There is increased signal intensity in the skin and subcutaneous soft tissues consistent with edema and/or cellulitis. There is slight thickening at the attachment of the plantar fascia upon the  calcaneus. There is slightly increased signal intensity in the flexor digitorum brevis muscle belly which may represent a partial tear      1. Increased signal intensity in the plantar soft tissues over the calcaneus which may represent a clinically described ulcer. No definite evidence of osteomyelitis in the underlying calcaneus. 2. Slightly increased fluid in the distal tibialis posterior tendon and along the peroneus brevis and longus tendon sheaths. 3. Increased signal intensity in the interosseous ligament between the talus and calcaneus which may be torn. 4. Abnormal signal intensity in the muscles along the medial aspect of the hindfoot. This may represent a tear of the abductor hallucis muscle belly. 5. Increased signal intensity in the flexor digitorum brevis muscle belly which may represent a partial tear. 6. Thickening at the attachment of the plantar fascia upon the calcaneus. 7. Small osteochondral defect in the medial talar dome. 8. Areas of abnormal signal intensity in the anterior process of the calcaneus, base of cuboid bone and tip of lateral malleolus suggestive of degenerative changes. 9. Increased signal intensity in the subcutaneous soft tissues over the medial and lateral aspects of the hindfoot consistent with edema and/or cellulitis. 10. Fluid collection along the talonavicular joint.  **This report has been created using Vitamin D deficiency    Seizures (HCC)    Nausea and vomiting    Type II diabetes mellitus with manifestations, uncontrolled (HCC)    Chondromalacia of knee, right    Effusion of left knee    Elevated levels of transaminase & lactic acid dehydrogenase    Other intervertebral disc degeneration, lumbar region    Palpitations    Splenomegaly    Sprain of right foot    Steatohepatitis    Osteopenia    Obstructive sleep apnea    Follicular neoplasm of thyroid    Nasal obstruction    Nasal septal deviation    Chest pain    MISTI (obstructive sleep apnea)    Acute post-operative pain    Bipolar 1 disorder, depressed ([address]    Status post uvulopalatopharyngoplasty    Status post partial thyroidectomy    Wound infection     Incision and Drainage Note    Performed by: Edward Gorman DPM    Consent obtained: Yes    Time out taken: Yes    Pain Control: 20cc 1% Lidocaine with epinephrine     Drainage Type: minimal, bloody    Instruments: forceps and #11 blade scalpel    Location of Incision and Drainage:    Plantar medial left heel   Wound #: 1    Incision and Drainage Size cm        Culture sent: No     Bleeding:with a small amount of bleeding    Hemostasis Achieved: not needed    Procedural Pain: 3  / 10     Post Procedural Pain: 3 / 10     Response to treatment: Well tolerated by patient. 4cm incision was made along medial aspect of left extending from the plantar ulcer proximally along medial heel utilizing a #11 blade. At this time the deeper soft tissues were bluntly dissected with a curved mosquito hemostat. There was no purulent drainage noted with manual expression. The incision was then copiously irrigated with 450 ml of sterile irisept solution. The surgical site was then dried in preparation for skin closure. At this time the incision was closed utilizing 3-0 monocryl suture in a simple interrupted technique. The remaining plantar wound was packed with plain packing strips.  A dry sterile compressive dressing consisting of Mepitel, gauze 4x4's, Kerlix and ACE bandage was then applied about the patient's LLE. All normal neurovascular responses including pink color and warmth were noted to be present to the patient's digits left foot. Patient tolerated procedure well with no complications noted. PLAN:   - Patient was examined and evaluated  - WBC 6.9, afebrile  - Labs reviewed, creatinine 0.7  - Blood cultures preliminary negative x2  - Cultures yield: MRSA light growth  - Continue IV Antibiotics vancomycin per ID recommendations  - Bedside I&D left heel performed; see procedure note above  - 2mg morphine PRN prescribed for pain with dressing changes  - Percocet 1 tab for pain moderate, 2 tab pain severe  - Podiatry will change dressings daily  - Nonweightbearing to the to the left foot.  WBAT to RLE  - Podiatry will continue to follow    DISPO: Pending pain control, per podiatry, patient ok to DC tomorrow 7/24/21    Anisa Leon DPM  Podiatric Surgical Resident  7/23/2021   9:42 AM

## 2021-07-24 LAB
ANION GAP SERPL CALCULATED.3IONS-SCNC: 10 MEQ/L (ref 8–16)
BUN BLDV-MCNC: 6 MG/DL (ref 7–22)
CALCIUM SERPL-MCNC: 8.6 MG/DL (ref 8.5–10.5)
CHLORIDE BLD-SCNC: 106 MEQ/L (ref 98–111)
CO2: 26 MEQ/L (ref 23–33)
CREAT SERPL-MCNC: 0.8 MG/DL (ref 0.4–1.2)
ERYTHROCYTE [DISTWIDTH] IN BLOOD BY AUTOMATED COUNT: 12.7 % (ref 11.5–14.5)
ERYTHROCYTE [DISTWIDTH] IN BLOOD BY AUTOMATED COUNT: 42 FL (ref 35–45)
GFR SERPL CREATININE-BSD FRML MDRD: > 90 ML/MIN/1.73M2
GLUCOSE BLD-MCNC: 107 MG/DL (ref 70–108)
GLUCOSE BLD-MCNC: 111 MG/DL (ref 70–108)
GLUCOSE BLD-MCNC: 117 MG/DL (ref 70–108)
GLUCOSE BLD-MCNC: 137 MG/DL (ref 70–108)
GLUCOSE BLD-MCNC: 59 MG/DL (ref 70–108)
GLUCOSE BLD-MCNC: 66 MG/DL (ref 70–108)
GLUCOSE BLD-MCNC: 71 MG/DL (ref 70–108)
GLUCOSE BLD-MCNC: 73 MG/DL (ref 70–108)
GLUCOSE BLD-MCNC: 77 MG/DL (ref 70–108)
GLUCOSE BLD-MCNC: 80 MG/DL (ref 70–108)
GLUCOSE BLD-MCNC: 88 MG/DL (ref 70–108)
GLUCOSE BLD-MCNC: 93 MG/DL (ref 70–108)
HCT VFR BLD CALC: 39.7 % (ref 42–52)
HEMOGLOBIN: 13.4 GM/DL (ref 14–18)
MCH RBC QN AUTO: 30.9 PG (ref 26–33)
MCHC RBC AUTO-ENTMCNC: 33.8 GM/DL (ref 32.2–35.5)
MCV RBC AUTO: 91.5 FL (ref 80–94)
PLATELET # BLD: 238 THOU/MM3 (ref 130–400)
PMV BLD AUTO: 10.2 FL (ref 9.4–12.4)
POTASSIUM REFLEX MAGNESIUM: 3.8 MEQ/L (ref 3.5–5.2)
RBC # BLD: 4.34 MILL/MM3 (ref 4.7–6.1)
SODIUM BLD-SCNC: 142 MEQ/L (ref 135–145)
WBC # BLD: 11.4 THOU/MM3 (ref 4.8–10.8)

## 2021-07-24 PROCEDURE — 82948 REAGENT STRIP/BLOOD GLUCOSE: CPT

## 2021-07-24 PROCEDURE — 2580000003 HC RX 258: Performed by: PHYSICIAN ASSISTANT

## 2021-07-24 PROCEDURE — 99233 SBSQ HOSP IP/OBS HIGH 50: CPT | Performed by: PHYSICIAN ASSISTANT

## 2021-07-24 PROCEDURE — 1200000000 HC SEMI PRIVATE

## 2021-07-24 PROCEDURE — 6370000000 HC RX 637 (ALT 250 FOR IP): Performed by: INTERNAL MEDICINE

## 2021-07-24 PROCEDURE — 6360000002 HC RX W HCPCS

## 2021-07-24 PROCEDURE — 80048 BASIC METABOLIC PNL TOTAL CA: CPT

## 2021-07-24 PROCEDURE — 36415 COLL VENOUS BLD VENIPUNCTURE: CPT

## 2021-07-24 PROCEDURE — 2580000003 HC RX 258

## 2021-07-24 PROCEDURE — 6370000000 HC RX 637 (ALT 250 FOR IP): Performed by: STUDENT IN AN ORGANIZED HEALTH CARE EDUCATION/TRAINING PROGRAM

## 2021-07-24 PROCEDURE — 2580000003 HC RX 258: Performed by: INTERNAL MEDICINE

## 2021-07-24 PROCEDURE — 85027 COMPLETE CBC AUTOMATED: CPT

## 2021-07-24 RX ORDER — DEXTROSE AND SODIUM CHLORIDE 5; .45 G/100ML; G/100ML
INJECTION, SOLUTION INTRAVENOUS CONTINUOUS
Status: DISCONTINUED | OUTPATIENT
Start: 2021-07-24 | End: 2021-07-25

## 2021-07-24 RX ORDER — LORAZEPAM 2 MG/ML
1 INJECTION INTRAMUSCULAR ONCE
Status: DISCONTINUED | OUTPATIENT
Start: 2021-07-24 | End: 2021-07-24

## 2021-07-24 RX ORDER — LORAZEPAM 2 MG/ML
1 INJECTION INTRAMUSCULAR
Status: ACTIVE | OUTPATIENT
Start: 2021-07-24 | End: 2021-07-24

## 2021-07-24 RX ADMIN — OXYCODONE AND ACETAMINOPHEN 2 TABLET: 5; 325 TABLET ORAL at 01:39

## 2021-07-24 RX ADMIN — SODIUM CHLORIDE, POTASSIUM CHLORIDE, SODIUM LACTATE AND CALCIUM CHLORIDE: 600; 310; 30; 20 INJECTION, SOLUTION INTRAVENOUS at 01:23

## 2021-07-24 RX ADMIN — LEVETIRACETAM 2000 MG: 500 TABLET, FILM COATED ORAL at 08:13

## 2021-07-24 RX ADMIN — LEVETIRACETAM 2000 MG: 500 TABLET, FILM COATED ORAL at 21:41

## 2021-07-24 RX ADMIN — Medication 15 G: at 06:35

## 2021-07-24 RX ADMIN — Medication 1250 MG: at 15:13

## 2021-07-24 RX ADMIN — ZONISAMIDE 500 MG: 100 CAPSULE ORAL at 21:46

## 2021-07-24 RX ADMIN — Medication 1250 MG: at 06:20

## 2021-07-24 RX ADMIN — Medication 15 G: at 07:42

## 2021-07-24 RX ADMIN — DEXTROSE AND SODIUM CHLORIDE: 5; 450 INJECTION, SOLUTION INTRAVENOUS at 09:39

## 2021-07-24 RX ADMIN — DIVALPROEX SODIUM 500 MG: 500 TABLET, DELAYED RELEASE ORAL at 08:13

## 2021-07-24 RX ADMIN — ROPINIROLE HYDROCHLORIDE 1 MG: 1 TABLET, FILM COATED ORAL at 13:09

## 2021-07-24 RX ADMIN — PANTOPRAZOLE SODIUM 40 MG: 40 TABLET, DELAYED RELEASE ORAL at 06:20

## 2021-07-24 RX ADMIN — ROPINIROLE HYDROCHLORIDE 1 MG: 1 TABLET, FILM COATED ORAL at 21:38

## 2021-07-24 RX ADMIN — GABAPENTIN 600 MG: 600 TABLET, FILM COATED ORAL at 21:46

## 2021-07-24 RX ADMIN — OXYCODONE AND ACETAMINOPHEN 2 TABLET: 5; 325 TABLET ORAL at 21:39

## 2021-07-24 RX ADMIN — LACOSAMIDE 200 MG: 50 TABLET, FILM COATED ORAL at 08:12

## 2021-07-24 RX ADMIN — METOPROLOL TARTRATE 75 MG: 25 TABLET, FILM COATED ORAL at 21:46

## 2021-07-24 RX ADMIN — DIVALPROEX SODIUM 500 MG: 500 TABLET, DELAYED RELEASE ORAL at 21:38

## 2021-07-24 RX ADMIN — METOPROLOL TARTRATE 75 MG: 25 TABLET, FILM COATED ORAL at 08:12

## 2021-07-24 RX ADMIN — LACOSAMIDE 200 MG: 50 TABLET, FILM COATED ORAL at 21:39

## 2021-07-24 RX ADMIN — GABAPENTIN 600 MG: 600 TABLET, FILM COATED ORAL at 13:09

## 2021-07-24 ASSESSMENT — PAIN SCALES - GENERAL
PAINLEVEL_OUTOF10: 9
PAINLEVEL_OUTOF10: 10
PAINLEVEL_OUTOF10: 9
PAINLEVEL_OUTOF10: 10

## 2021-07-24 ASSESSMENT — PAIN - FUNCTIONAL ASSESSMENT: PAIN_FUNCTIONAL_ASSESSMENT: PREVENTS OR INTERFERES SOME ACTIVE ACTIVITIES AND ADLS

## 2021-07-24 ASSESSMENT — PAIN DESCRIPTION - LOCATION: LOCATION: FOOT

## 2021-07-24 ASSESSMENT — PAIN DESCRIPTION - FREQUENCY: FREQUENCY: CONTINUOUS

## 2021-07-24 ASSESSMENT — PAIN DESCRIPTION - PROGRESSION: CLINICAL_PROGRESSION: NOT CHANGED

## 2021-07-24 ASSESSMENT — PAIN DESCRIPTION - ONSET: ONSET: ON-GOING

## 2021-07-24 ASSESSMENT — PAIN DESCRIPTION - ORIENTATION: ORIENTATION: LEFT

## 2021-07-24 ASSESSMENT — PAIN DESCRIPTION - PAIN TYPE: TYPE: ACUTE PAIN

## 2021-07-24 ASSESSMENT — PAIN DESCRIPTION - DESCRIPTORS: DESCRIPTORS: STABBING

## 2021-07-24 NOTE — PLAN OF CARE
Problem: Pain:  Goal: Pain level will decrease  Description: Pain level will decrease  Outcome: Ongoing  Goal: Control of acute pain  Description: Control of acute pain  Outcome: Ongoing  Goal: Control of chronic pain  Description: Control of chronic pain  Outcome: Ongoing     Problem: Falls - Risk of:  Goal: Will remain free from falls  Description: Will remain free from falls  Outcome: Ongoing  Goal: Absence of physical injury  Description: Absence of physical injury  Outcome: Ongoing     Problem: Discharge Planning:  Goal: Discharged to appropriate level of care  Description: Discharged to appropriate level of care  Outcome: Ongoing     Problem: Serum Glucose Level - Abnormal:  Goal: Ability to maintain appropriate glucose levels will improve  Description: Ability to maintain appropriate glucose levels will improve  Outcome: Ongoing     Problem: Sensory Perception - Impaired:  Goal: Ability to maintain a stable neurologic state will improve  Description: Ability to maintain a stable neurologic state will improve  Outcome: Ongoing     Problem: Infection - Methicillin-Resistant Staphylococcus Aureus Infection:  Goal: Absence of methicillin-resistant Staphylococcus aureus infection  Description: Absence of methicillin-resistant Staphylococcus aureus infection  Outcome: Ongoing     Problem: Skin Integrity:  Goal: Will show no infection signs and symptoms  Description: Will show no infection signs and symptoms  Outcome: Ongoing  Goal: Absence of new skin breakdown  Description: Absence of new skin breakdown  Outcome: Ongoing     Problem: DISCHARGE BARRIERS  Goal: Patient's continuum of care needs are met  Outcome: Ongoing     Problem: Nutrition  Goal: Optimal nutrition therapy  Outcome: Ongoing

## 2021-07-24 NOTE — PROGRESS NOTES
Podiatric Progress Note  Mae Grullon  Subjective :   7/24/2021  Patient seen bedside today on behalf of Dr. Leidy Lovell. Patient appeared pleasant, was oriented to person, place and time. Patient relates that he still has pain to his left foot, but it is improving, this morning he rates it 6/10. Patient states he slept \"ok\" last night. Patient states he has a lot of pain with dressing changes. He denies N/V/F/SOB/C/CP or other constitutional symptoms at this time. Patient has no further pedal concerns at this point in time. 7/23/2021  Patient seen bedside today with Dr. Leidy Lovell. Plan today is for a bedside incision and drainage of left heel abscess, patient consent form is signed. Patient appeared pleasant, was oriented to person, place and time. Patient relates that he still has significant pain in his left foot. Pain rates 8/10 this morning. Patient states he has a lot of pain with dressing changes. He denies N/V/F/SOB/C/CP or other constitutional symptoms at this time. Patient has no further pedal concerns at this point in time. 7/22/2021  Patient seen bedside today on behalf of Dr. Leidy Lovell. Patient appeared pleasant, was oriented to person, place and time. Patient relates that he still has significant pain in his left foot. Pain rates 9/10 this morning. Patient states he has a lot of pain with dressing changes. He denies N/V/F/SOB/C/CP or other constitutional symptoms at this time. Patient has no further pedal concerns at this point in time. 7/21/2021  Patient seen bedside today alongside of Dr. Leidy Lovell. Patient appeared pleasant, was oriented to person, place and time. Patient relates that he still has significant pain in his left foot. Pain is to the inside part of his heel. It is extremely painful with manipulation, and dressing changes are very difficult for him. He states that he received norco for pain earlier this morning, but it has not done anything for him as of yet.  Patient endorses chills a couple days ago, but denies any nausea, vomiting, fever, chest pain, shortness of breath. Patient has no further pedal concerns at this point in time.      Current Medications:    Current Facility-Administered Medications: dextrose 5 % and 0.45 % sodium chloride infusion, , Intravenous, Continuous  vancomycin (VANCOCIN) 1250 mg in dextrose 5 % 250 mL IVPB, 1,250 mg, Intravenous, Q8H  oxyCODONE-acetaminophen (PERCOCET) 5-325 MG per tablet 1 tablet, 1 tablet, Oral, Q4H PRN **OR** oxyCODONE-acetaminophen (PERCOCET) 5-325 MG per tablet 2 tablet, 2 tablet, Oral, Q4H PRN  lactobacillus (CULTURELLE) capsule 1 capsule, 1 capsule, Oral, Daily with breakfast  insulin lispro (HUMALOG) injection vial 10 Units, 10 Units, Subcutaneous, TID WC  morphine (PF) injection 2 mg, 2 mg, Intravenous, Q4H PRN  bupivacaine (PF) (MARCAINE) 0.5 % injection 150 mg, 30 mL, Intradermal, Once  divalproex (DEPAKOTE) DR tablet 500 mg, 500 mg, Oral, BID  DULoxetine (CYMBALTA) extended release capsule 60 mg, 60 mg, Oral, Daily  empagliflozin (JARDIANCE) tablet TABS 25 mg, 25 mg, Oral, Daily  gabapentin (NEURONTIN) tablet 600 mg, 600 mg, Oral, 4x Daily  insulin glargine (LANTUS) injection vial 75 Units, 75 Units, Subcutaneous, BID  lacosamide (VIMPAT) tablet 200 mg, 200 mg, Oral, BID  levETIRAcetam (KEPPRA) tablet 2,000 mg, 2,000 mg, Oral, BID  metoprolol tartrate (LOPRESSOR) tablet 75 mg, 75 mg, Oral, BID  pantoprazole (PROTONIX) tablet 40 mg, 40 mg, Oral, BID  promethazine (PHENERGAN) tablet 12.5 mg, 12.5 mg, Oral, Q8H PRN  rOPINIRole (REQUIP) tablet 1 mg, 1 mg, Oral, TID  zonisamide (ZONEGRAN) capsule 500 mg, 500 mg, Oral, Nightly  sodium chloride flush 0.9 % injection 5-40 mL, 5-40 mL, Intravenous, 2 times per day  sodium chloride flush 0.9 % injection 5-40 mL, 5-40 mL, Intravenous, PRN  0.9 % sodium chloride infusion, 25 mL, Intravenous, PRN  acetaminophen (TYLENOL) tablet 650 mg, 650 mg, Oral, Q6H PRN **OR** acetaminophen (TYLENOL) suppository 650 mg, 650 mg, Rectal, Q6H PRN  glucose (GLUTOSE) 40 % oral gel 15 g, 15 g, Oral, PRN  dextrose 50 % IV solution, 12.5 g, Intravenous, PRN  glucagon (rDNA) injection 1 mg, 1 mg, Intramuscular, PRN  dextrose 5 % solution, 100 mL/hr, Intravenous, PRN  insulin lispro (HUMALOG) injection vial 0-12 Units, 0-12 Units, Subcutaneous, TID WC  insulin lispro (HUMALOG) injection vial 0-6 Units, 0-6 Units, Subcutaneous, Nightly  vancomycin (VANCOCIN) intermittent dosing (placeholder), , Other, RX Placeholder    Objective     /68   Pulse 68   Temp 98.6 °F (37 °C) (Oral)   Resp 18   Ht 6' 2\" (1.88 m)   Wt 244 lb 1.6 oz (110.7 kg)   SpO2 94%   BMI 31.34 kg/m²      I/O:    Intake/Output Summary (Last 24 hours) at 7/24/2021 1213  Last data filed at 7/24/2021 1059  Gross per 24 hour   Intake 5730.63 ml   Output 625 ml   Net 5105.63 ml              Wt Readings from Last 3 Encounters:   07/21/21 244 lb 1.6 oz (110.7 kg)   06/28/21 254 lb (115.2 kg)   04/13/21 249 lb 3.2 oz (113 kg)       LABS:    Recent Labs     07/23/21  0558 07/24/21  0445   WBC 6.9 11.4*   HGB 13.6* 13.4*   HCT 39.6* 39.7*    238        Recent Labs     07/24/21  0445      K 3.8      CO2 26   BUN 6*   CREATININE 0.8        No results for input(s): PROT, INR, APTT in the last 72 hours. No results for input(s): CKTOTAL, CKMB, CKMBINDEX, TROPONINI in the last 72 hours. Exam:   Dressing intact and well maintained. No apparent strike through noted. Vascular: Dorsalis pedis and posterior tibial pulses are palpable to right foot, DP is palpable to left. PT non-palpable left. Skin temperature is warm  to cool from proximal tibial tuberosity to distal digits. CFT brisk to exposed digits. Edema noted to left heel and ankle, non-pitting. Hair growth present to bilateral feet. Quality of skin is wnl for patient age. Dermatologic: Open lesion x2 noted to anterior aspect of right leg.  The proximal lesion measures 2cm in diameter and displays a fibro-necrotic base. The more distal lesion measures 3.5 cm in diameter with a necrotic base, no drainage noted. To both lesions, negative probe to bone, negative malodor, but surrounding erythema noted. On left, patient has open wound to heel. Scant sanguinous discharge present. Some karin-wound erythema present extending dorsally and medially. Wound probes to bone, does not track to medial foot. No purulence expressed. Incision present along medial heel, extending proximally from left heel wound. Well-coapted in appearance, intact sutures noted. Negative drainage, dehiscence, clinical signs of infection. Neurovascular: Light touch sensation grossly absent to bilateral feet, restored at mid calf. Musculoskeletal: Muscle strength 5/5 for all plantarflexors, dorsiflexors, inverters and everters to RLE, 4/5 for all quadrants LLE. Pain to palpation of left heel, and left ankle to the medial and posterior aspects. Cavus foot type noted bilateral. Semi-rigid contractures of digits 1-5 bilateral noted. No amputations noted bilateral.     Left heel              Right anterior leg          IMAGING:    XR FOOT LEFT (MIN 3 VIEWS)    Result Date: 7/20/2021  PROCEDURE: XR FOOT LEFT (MIN 3 VIEWS) CLINICAL INFORMATION: pain COMPARISON: No prior study. TECHNIQUE: 4 views of the foot were obtained. FINDINGS: No acute fracture or dislocation is seen. There is no soft tissue swelling. There is a moderate-sized plantar calcaneal spur. Plantar calcaneal spur. Otherwise normal left foot. **This report has been created using voice recognition software. It may contain minor errors which are inherent in voice recognition technology. ** Final report electronically signed by Dr. Paul Chow on 7/20/2021 3:57 PM    CT TIBIA FIBULA LEFT WO CONTRAST    Result Date: 7/20/2021  PROCEDURE: NONCONTRAST CT LEFT TIBIA/FIBULA: CLINICAL INFORMATION: Infection/ abscess TECHNIQUE: Multiple axial 3 mm images of the left intensity in the flexor digitorum brevis muscle belly which may represent a partial tear. 6. Thickening at the attachment of the plantar fascia upon the calcaneus. 7. Small osteochondral defect in the medial talar dome. 8. Areas of abnormal signal intensity in the anterior process of the calcaneus, base of cuboid bone and tip of lateral malleolus suggestive of degenerative changes. 9. Increased signal intensity in the subcutaneous soft tissues over the medial and lateral aspects of the hindfoot consistent with edema and/or cellulitis. 10. Fluid collection along the talonavicular joint. **This report has been created using voice recognition software. It may contain minor errors which are inherent in voice recognition technology. ** Final report electronically signed by DR Daisha Garcia on 7/21/2021 3:25 PM    ASSESSMENT  Principle  1. Abscess, left foot, resolving  2. Puncture wound, left foot  3. Ulcerations, right leg, improving  4.  Cellulitis, right proximal leg and left heel    Chronic  Patient Active Problem List   Diagnosis    Depression    Liver disease    Restless legs syndrome    Chronic back pain    Other chest pain    Irritable bowel syndrome    Non compliance w medication regimen    Hypertension associated with diabetes ([address]    Hyperlipidemia with target LDL less than 100    IBS (irritable bowel syndrome)    Sinus tachycardia    Confusion    Encephalopathy    Diabetes mellitus type II, uncontrolled (HCC)    Dizziness    Neuropathy    Hyperammonemia (HCC)    Medication overdose, insulin    Suicide attempt ([address]    Hypoglycemia    Hypokalemia    Lithium toxicity    Nausea & vomiting    Snoring    Hypogonadism    Erectile dysfunction due to diseases classified elsewhere    Rash    Headaches due to old head injury    Abdominal wall pain in left lower quadrant    Bipolar affective disorder ([address]    Hypogonadism in male    Type 2 diabetes mellitus with diabetic neuropathy ([address]    Hypomagnesemia    Partial seizure (HCC)    Syncope and collapse    Headache disorder    Partial symptomatic epilepsy with complex partial seizures, not intractable, without status epilepticus ([address]    Respiratory acidosis    Essential hypertension    Gastritis    Gastroesophageal reflux disease    Mixed hyperlipidemia    Acute lateral meniscus tear of right knee    Recurrent right inguinal hernia    Bicipital tendinitis of right shoulder    Carpal tunnel syndrome of right wrist    Obesity (BMI 30-39. 9)    Lump of axilla    Familial hypercholesterolemia    Coronary artery disease of native artery of native heart with stable angina pectoris (HCC)    Exertional dyspnea    Vitamin D deficiency    Seizures (HCC)    Nausea and vomiting    Type II diabetes mellitus with manifestations, uncontrolled (HCC)    Chondromalacia of knee, right    Effusion of left knee    Elevated levels of transaminase & lactic acid dehydrogenase    Other intervertebral disc degeneration, lumbar region    Palpitations    Splenomegaly    Sprain of right foot    Steatohepatitis    Osteopenia    Obstructive sleep apnea    Follicular neoplasm of thyroid    Nasal obstruction    Nasal septal deviation    Chest pain    MISTI (obstructive sleep apnea)    Acute post-operative pain    Bipolar 1 disorder, depressed ([address]    Status post uvulopalatopharyngoplasty    Status post partial thyroidectomy    Wound infection         PLAN:   - Patient was examined and evaluated  - WBC 11.4, afebrile  - f/u CBC for tomorrow A.M.  - Labs reviewed, creatinine 0.7  - Blood cultures x2, one grows staphylococcus, per report contamination suspected  - Cultures yield: heavy mixed anaerobic gram negative bacilli and gram positive cocci (MRSA)  - Continue IV Antibiotics vancomycin per ID recommendations  - Packing to left heel changed with Mesalt ribbon, dressed with gauze 4x4's, kerlix, ACE  - RLE dressing changed with Cuticerin, gauze 4x4's, Kerlix, ACE  - Continue 2mg morphine PRN prescribed for pain with dressing changes   - Continue Percocet 1 tab for pain moderate, 2 tab pain severe  - Podiatry will change dressings daily  - Nonweightbearing to the left foot. WBAT to RLE  - Podiatry will continue to follow    DISPO: Pending pain control, resolution of leukocytosis, will f/u cbc for 7/25 A. M.     Aries Perez DPM  Podiatric Surgical Resident  7/24/2021   12:13 PM

## 2021-07-24 NOTE — PROGRESS NOTES
Patient complains of not feeling well, sick to his stomach, Patient seems to be shaking slightly, This nurse assessed and checked his glucose. Patient's glucose is 59. Administered a tube of glucose gel and will recheck glucose in 15 minutes.

## 2021-07-24 NOTE — PROGRESS NOTES
Hospitalist Progress Note      Patient:  Zeeshan Christensen    Unit/Bed:7K-19/019-A  YOB: 1970  MRN: [id number]   Acct: [de-identified]   PCP: Bhupinder Vazquez MD  Date of Admission: 7/20/2021    Assessment/Plan:    1. Multiple Wounds: Cellulitis with Abscess, Left heel & Diabetic Ulcers, RLE: Podiatry & ID consulted. Continue IV ABX. Following cultures, blood cultures are NGTD. Pt is afebrile and no leukocytosis. Norco changed to Q6H from [facility] for better pain control. Podiatry bedside debridement on 7/23. Pt will be prescribed PO ABX at [facility]. 2. IDDM, uncontrolled: HgbA1c 8.3, BS has been very liable, 50-110s; pt is now on dextrose drip. Insulin regimen: Lantus 75 units BID + Humalog 10 units + SSI. Will continue to check glucose multiple times a day to tight control. 3. Seizure DO: Continue home medications. 4. Essential HTN: BP is stable. Continue home medications. 5. HLD: Statin. Dispo: Pt will be discharged once glucose is stable. Podiatry & ID okay with [facility]. Chief Complaint: Leg Wounds     Initial H and P:-    Initial H&P \"Patient was sent here after a appointment with his primary care physician. He has been having issues with nonhealing lower extremity wounds. He has been trying to keep the wounds clean with soap and water Hibiclens and an biotic ointment. However the wounds are getting worse. Has a history of MRSA as well. He also has noted that his blood sugars have been significantly elevated as of late. He states he also has not ate or drink very well recently. He is brought to the ER and seen by podiatry who performed some debridement and otherwise he is being admitted for IV antibiotics for the treatment of these wounds. \"     7/21: No acute events overnight. Pt states that his pain is not controlled at all. Podiatry consulted and awaiting surgical decision. ID consulted today. 7/22: No acute events overnight.  Pt states that her pain is relatively controlled. Pt states that his BS is high due to his insulin being late. 7/23: No acute events overnight. Bedside I&D by podiatry. Podiatry is thinking discharge tomorrow after looking at debridement. Glucose is much more controlled. Subjective (past 24 hours):   Patient has been struggling with hypoglycemia early this a.m. and throughout the morning. Patient states he is not feeling well and does not want to eat breakfast.  Dextrose drip ordered. We will continue to monitor. Patient states he has not slept well in the last 3 days and thinks that might be contributing to his issues. Past medical history, family history, social history and allergies reviewed again and is unchanged since admission. ROS Pt denies CP, SOB, HA, abd pain, diarrhea. Pt admits to leg pain.      Medications:  Reviewed    Infusion Medications    dextrose 5 % and 0.45 % NaCl 50 mL/hr at 07/24/21 6145    sodium chloride 25 mL (07/20/21 1936)    dextrose       Scheduled Medications    vancomycin  1,250 mg Intravenous Q8H    lactobacillus  1 capsule Oral Daily with breakfast    insulin lispro  10 Units Subcutaneous TID WC    bupivacaine (PF)  30 mL Intradermal Once    divalproex  500 mg Oral BID    DULoxetine  60 mg Oral Daily    empagliflozin  25 mg Oral Daily    gabapentin  600 mg Oral 4x Daily    insulin glargine  75 Units Subcutaneous BID    lacosamide  200 mg Oral BID    levETIRAcetam  2,000 mg Oral BID    metoprolol tartrate  75 mg Oral BID    pantoprazole  40 mg Oral BID    rOPINIRole  1 mg Oral TID    zonisamide  500 mg Oral Nightly    sodium chloride flush  5-40 mL Intravenous 2 times per day    insulin lispro  0-12 Units Subcutaneous TID WC    insulin lispro  0-6 Units Subcutaneous Nightly    vancomycin (VANCOCIN) intermittent dosing (placeholder)   Other RX Placeholder     PRN Meds: oxyCODONE-acetaminophen **OR** oxyCODONE-acetaminophen, morphine, promethazine, sodium Date    BC No growth-preliminary  07/20/2021       Lab Results   Component Value Date    LABGRAM  07/20/2021     Few segmented neutrophils observed. Rare epithelial cells observed. Many gram positive cocci occurring singly and in pairs. Urinalysis:      Lab Results   Component Value Date    NITRU NEGATIVE 02/08/2021    WBCUA NONE 08/12/2019    WBCUA 6-10 10/17/2015    BACTERIA NONE 08/12/2019    RBCUA NONE 08/12/2019    BLOODU NEGATIVE 02/08/2021    SPECGRAV >1.030 08/12/2019    GLUCOSEU >= 1000 02/08/2021       Radiology:  MRI ANKLE LEFT W WO CONTRAST   Final Result       1. Increased signal intensity in the plantar soft tissues over the calcaneus which may represent a clinically described ulcer. No definite evidence of osteomyelitis in the underlying calcaneus. 2. Slightly increased fluid in the distal tibialis posterior tendon and along the peroneus brevis and longus tendon sheaths. 3. Increased signal intensity in the interosseous ligament between the talus and calcaneus which may be torn. 4. Abnormal signal intensity in the muscles along the medial aspect of the hindfoot. This may represent a tear of the abductor hallucis muscle belly. 5. Increased signal intensity in the flexor digitorum brevis muscle belly which may represent a partial tear. 6. Thickening at the attachment of the plantar fascia upon the calcaneus. 7. Small osteochondral defect in the medial talar dome. 8. Areas of abnormal signal intensity in the anterior process of the calcaneus, base of cuboid bone and tip of lateral malleolus suggestive of degenerative changes. 9. Increased signal intensity in the subcutaneous soft tissues over the medial and lateral aspects of the hindfoot consistent with edema and/or cellulitis. 10. Fluid collection along the talonavicular joint. **This report has been created using voice recognition software.  It may contain minor errors which are inherent in voice recognition technology. **      Final report electronically signed by DR Simeon Falrey on 7/21/2021 3:25 PM      CT TIBIA FIBULA LEFT WO CONTRAST   Final Result   1. Degenerative changes of the left knee. Postsurgical changes left knee. Multiple varicosities this left calf. 2. Study otherwise unremarkable. No tibial or fibular fracture is seen. **This report has been created using voice recognition software. It may contain minor errors which are inherent in voice recognition technology. **      Final report electronically signed by Dr. Lambert Or on 7/20/2021 9:07 PM      XR FOOT LEFT (MIN 3 VIEWS)   Final Result   Plantar calcaneal spur. Otherwise normal left foot. **This report has been created using voice recognition software. It may contain minor errors which are inherent in voice recognition technology. **      Final report electronically signed by Dr. Lambert Or on 7/20/2021 3:57 PM        Electronically signed by ASHLEY Clark on 7/24/2021 at 10:59 AM

## 2021-07-25 LAB
AEROBIC CULTURE: ABNORMAL
AEROBIC CULTURE: ABNORMAL
ANAEROBIC CULTURE: ABNORMAL
ANAEROBIC CULTURE: ABNORMAL
ANION GAP SERPL CALCULATED.3IONS-SCNC: 8 MEQ/L (ref 8–16)
BUN BLDV-MCNC: 6 MG/DL (ref 7–22)
CALCIUM SERPL-MCNC: 8.7 MG/DL (ref 8.5–10.5)
CHLORIDE BLD-SCNC: 105 MEQ/L (ref 98–111)
CO2: 25 MEQ/L (ref 23–33)
CREAT SERPL-MCNC: 0.7 MG/DL (ref 0.4–1.2)
ERYTHROCYTE [DISTWIDTH] IN BLOOD BY AUTOMATED COUNT: 12.5 % (ref 11.5–14.5)
ERYTHROCYTE [DISTWIDTH] IN BLOOD BY AUTOMATED COUNT: 41.4 FL (ref 35–45)
ERYTHROCYTE [DISTWIDTH] IN BLOOD BY AUTOMATED COUNT: NORMAL % (ref 11.5–14.5)
ERYTHROCYTE [DISTWIDTH] IN BLOOD BY AUTOMATED COUNT: NORMAL FL (ref 35–45)
GFR SERPL CREATININE-BSD FRML MDRD: > 90 ML/MIN/1.73M2
GLUCOSE BLD-MCNC: 106 MG/DL (ref 70–108)
GLUCOSE BLD-MCNC: 128 MG/DL (ref 70–108)
GLUCOSE BLD-MCNC: 131 MG/DL (ref 70–108)
GLUCOSE BLD-MCNC: 146 MG/DL (ref 70–108)
GLUCOSE BLD-MCNC: 146 MG/DL (ref 70–108)
GLUCOSE BLD-MCNC: 96 MG/DL (ref 70–108)
GRAM STAIN RESULT: ABNORMAL
GRAM STAIN RESULT: ABNORMAL
HCT VFR BLD CALC: 41.5 % (ref 42–52)
HCT VFR BLD CALC: NORMAL % (ref 42–52)
HEMOGLOBIN: 13.8 GM/DL (ref 14–18)
HEMOGLOBIN: NORMAL GM/DL (ref 14–18)
MCH RBC QN AUTO: 30.5 PG (ref 26–33)
MCH RBC QN AUTO: NORMAL PG (ref 26–33)
MCHC RBC AUTO-ENTMCNC: 33.3 GM/DL (ref 32.2–35.5)
MCHC RBC AUTO-ENTMCNC: NORMAL GM/DL (ref 32.2–35.5)
MCV RBC AUTO: 91.6 FL (ref 80–94)
MCV RBC AUTO: NORMAL FL (ref 80–94)
ORGANISM: ABNORMAL
ORGANISM: ABNORMAL
PLATELET # BLD: 222 THOU/MM3 (ref 130–400)
PLATELET # BLD: NORMAL THOU/MM3 (ref 130–400)
PMV BLD AUTO: 9.7 FL (ref 9.4–12.4)
PMV BLD AUTO: NORMAL FL (ref 9.4–12.4)
POTASSIUM REFLEX MAGNESIUM: 4.1 MEQ/L (ref 3.5–5.2)
RBC # BLD: 4.53 MILL/MM3 (ref 4.7–6.1)
RBC # BLD: NORMAL MILL/MM3 (ref 4.7–6.1)
REASON FOR REJECTION: NORMAL
REJECTED TEST: NORMAL
SCAN OF BLOOD SMEAR: NORMAL
SODIUM BLD-SCNC: 138 MEQ/L (ref 135–145)
VALPROIC ACID LEVEL: 42.8 UG/ML (ref 50–100)
WBC # BLD: 6.1 THOU/MM3 (ref 4.8–10.8)
WBC # BLD: NORMAL THOU/MM3 (ref 4.8–10.8)

## 2021-07-25 PROCEDURE — 2500000003 HC RX 250 WO HCPCS: Performed by: PSYCHIATRY & NEUROLOGY

## 2021-07-25 PROCEDURE — 80164 ASSAY DIPROPYLACETIC ACD TOT: CPT

## 2021-07-25 PROCEDURE — 80048 BASIC METABOLIC PNL TOTAL CA: CPT

## 2021-07-25 PROCEDURE — 2580000003 HC RX 258

## 2021-07-25 PROCEDURE — 2580000003 HC RX 258: Performed by: PSYCHIATRY & NEUROLOGY

## 2021-07-25 PROCEDURE — 6370000000 HC RX 637 (ALT 250 FOR IP): Performed by: INTERNAL MEDICINE

## 2021-07-25 PROCEDURE — 99222 1ST HOSP IP/OBS MODERATE 55: CPT | Performed by: PSYCHIATRY & NEUROLOGY

## 2021-07-25 PROCEDURE — 82948 REAGENT STRIP/BLOOD GLUCOSE: CPT

## 2021-07-25 PROCEDURE — 85027 COMPLETE CBC AUTOMATED: CPT

## 2021-07-25 PROCEDURE — 1200000000 HC SEMI PRIVATE

## 2021-07-25 PROCEDURE — 95819 EEG AWAKE AND ASLEEP: CPT

## 2021-07-25 PROCEDURE — 95822 EEG COMA OR SLEEP ONLY: CPT | Performed by: PSYCHIATRY & NEUROLOGY

## 2021-07-25 PROCEDURE — 99233 SBSQ HOSP IP/OBS HIGH 50: CPT | Performed by: PHYSICIAN ASSISTANT

## 2021-07-25 PROCEDURE — 6360000002 HC RX W HCPCS: Performed by: HOSPITALIST

## 2021-07-25 PROCEDURE — 2580000003 HC RX 258: Performed by: INTERNAL MEDICINE

## 2021-07-25 PROCEDURE — 6360000002 HC RX W HCPCS

## 2021-07-25 PROCEDURE — 36415 COLL VENOUS BLD VENIPUNCTURE: CPT

## 2021-07-25 PROCEDURE — 6360000002 HC RX W HCPCS: Performed by: PSYCHIATRY & NEUROLOGY

## 2021-07-25 RX ORDER — LORAZEPAM 2 MG/ML
1 INJECTION INTRAMUSCULAR ONCE
Status: COMPLETED | OUTPATIENT
Start: 2021-07-25 | End: 2021-07-25

## 2021-07-25 RX ORDER — ONDANSETRON 2 MG/ML
4 INJECTION INTRAMUSCULAR; INTRAVENOUS EVERY 6 HOURS PRN
Status: DISCONTINUED | OUTPATIENT
Start: 2021-07-25 | End: 2021-07-27 | Stop reason: HOSPADM

## 2021-07-25 RX ADMIN — LACOSAMIDE 200 MG: 50 TABLET, FILM COATED ORAL at 23:44

## 2021-07-25 RX ADMIN — ZONISAMIDE 500 MG: 100 CAPSULE ORAL at 23:44

## 2021-07-25 RX ADMIN — Medication 1250 MG: at 06:46

## 2021-07-25 RX ADMIN — GABAPENTIN 600 MG: 600 TABLET, FILM COATED ORAL at 23:44

## 2021-07-25 RX ADMIN — DIVALPROEX SODIUM 500 MG: 500 TABLET, DELAYED RELEASE ORAL at 23:44

## 2021-07-25 RX ADMIN — METOPROLOL TARTRATE 75 MG: 25 TABLET, FILM COATED ORAL at 09:31

## 2021-07-25 RX ADMIN — Medication 1250 MG: at 00:35

## 2021-07-25 RX ADMIN — ROPINIROLE HYDROCHLORIDE 1 MG: 1 TABLET, FILM COATED ORAL at 23:44

## 2021-07-25 RX ADMIN — Medication 1250 MG: at 16:42

## 2021-07-25 RX ADMIN — LACOSAMIDE 200 MG: 50 TABLET, FILM COATED ORAL at 09:31

## 2021-07-25 RX ADMIN — SODIUM CHLORIDE, PRESERVATIVE FREE 10 ML: 5 INJECTION INTRAVENOUS at 06:47

## 2021-07-25 RX ADMIN — LEVETIRACETAM 2000 MG: 500 TABLET, FILM COATED ORAL at 09:31

## 2021-07-25 RX ADMIN — ONDANSETRON HYDROCHLORIDE 4 MG: 2 SOLUTION INTRAMUSCULAR; INTRAVENOUS at 03:01

## 2021-07-25 RX ADMIN — SODIUM CHLORIDE, PRESERVATIVE FREE 10 ML: 5 INJECTION INTRAVENOUS at 00:38

## 2021-07-25 RX ADMIN — DIVALPROEX SODIUM 500 MG: 500 TABLET, DELAYED RELEASE ORAL at 09:31

## 2021-07-25 RX ADMIN — METOPROLOL TARTRATE 75 MG: 25 TABLET, FILM COATED ORAL at 23:44

## 2021-07-25 RX ADMIN — SODIUM CHLORIDE, PRESERVATIVE FREE 10 ML: 5 INJECTION INTRAVENOUS at 23:44

## 2021-07-25 RX ADMIN — LEVETIRACETAM 2000 MG: 500 TABLET, FILM COATED ORAL at 23:44

## 2021-07-25 RX ADMIN — Medication 1250 MG: at 22:30

## 2021-07-25 RX ADMIN — PANTOPRAZOLE SODIUM 40 MG: 40 TABLET, DELAYED RELEASE ORAL at 06:46

## 2021-07-25 RX ADMIN — LORAZEPAM 1 MG: 2 INJECTION INTRAMUSCULAR; INTRAVENOUS at 15:21

## 2021-07-25 RX ADMIN — DEXTROSE MONOHYDRATE 500 MG: 50 INJECTION, SOLUTION INTRAVENOUS at 15:26

## 2021-07-25 ASSESSMENT — PAIN SCALES - GENERAL
PAINLEVEL_OUTOF10: 0
PAINLEVEL_OUTOF10: 8
PAINLEVEL_OUTOF10: 0

## 2021-07-25 ASSESSMENT — PAIN DESCRIPTION - PROGRESSION
CLINICAL_PROGRESSION: NOT CHANGED
CLINICAL_PROGRESSION: NOT CHANGED

## 2021-07-25 ASSESSMENT — PAIN DESCRIPTION - FREQUENCY: FREQUENCY: CONTINUOUS

## 2021-07-25 ASSESSMENT — PAIN DESCRIPTION - ORIENTATION: ORIENTATION: LEFT

## 2021-07-25 ASSESSMENT — PAIN DESCRIPTION - ONSET: ONSET: ON-GOING

## 2021-07-25 ASSESSMENT — PAIN - FUNCTIONAL ASSESSMENT: PAIN_FUNCTIONAL_ASSESSMENT: PREVENTS OR INTERFERES SOME ACTIVE ACTIVITIES AND ADLS

## 2021-07-25 ASSESSMENT — PAIN DESCRIPTION - LOCATION: LOCATION: FOOT

## 2021-07-25 ASSESSMENT — PAIN DESCRIPTION - PAIN TYPE: TYPE: SURGICAL PAIN;ACUTE PAIN

## 2021-07-25 ASSESSMENT — PAIN DESCRIPTION - DESCRIPTORS: DESCRIPTORS: SHARP;CONSTANT

## 2021-07-25 NOTE — PROGRESS NOTES
Hospitalist Progress Note      Patient:  Brittanie Diaz    Unit/Bed:7K-19/019-A  YOB: 1970  MRN: [id number]   Acct: [de-identified]   PCP: Jeanie Wilson MD  Date of Admission: 7/20/2021    Assessment/Plan:    1. Multiple Wounds: Cellulitis with Abscess, Left heel & Diabetic Ulcers, RLE: Podiatry & ID consulted. Continue IV ABX. Following cultures, blood cultures are NGTD. Pt is afebrile and no leukocytosis. Norco changed to Q6H from [facility] for better pain control. Podiatry bedside debridement on 7/23. Pt will be prescribed PO ABX at [place]. 2. IDDM, uncontrolled: HgbA1c 8.3, BS has been very liable, more stable now Dextrose drip stopped. Insulin regimen: Lantus 75 units BID + Humalog 10 units + SSI. Will continue to check glucose multiple times a day to tight control. 3. Seizure DO: Pt has been having episodes of unresponsiveness, family states that his seizures look like this. Neurology consulted; appreciate recommendations. EEG completed today, which showed abnormalities. 4. Essential HTN: BP is stable. Continue home medications. 5. HLD: Statin. Chief Complaint: Leg Wounds     Initial H and P:-    Initial H&P \"Patient was sent here after a appointment with his primary care physician. He has been having issues with nonhealing lower extremity wounds. He has been trying to keep the wounds clean with soap and water Hibiclens and an biotic ointment. However the wounds are getting worse. Has a history of MRSA as well. He also has noted that his blood sugars have been significantly elevated as of late. He states he also has not ate or drink very well recently. He is brought to the ER and seen by podiatry who performed some debridement and otherwise he is being admitted for IV antibiotics for the treatment of these wounds. \"     7/21: No acute events overnight. Pt states that his pain is not controlled at all.  Podiatry consulted and awaiting surgical decision. ID consulted today. 7/22: No acute events overnight. Pt states that her pain is relatively controlled. Pt states that his BS is high due to his insulin being late. 7/23: No acute events overnight. Bedside I&D by podiatry. Podiatry is thinking discharge tomorrow after looking at debridement. Glucose is much more controlled. 7/24: Patient has been struggling with hypoglycemia early this a.m. and throughout the morning. Patient states he is not feeling well and does not want to eat breakfast.  Dextrose drip ordered. We will continue to monitor. Patient states he has not slept well in the last 3 days and thinks that might be contributing to his issues. Subjective (past 24 hours):   Pt continues to have blank stares and sleepiness. EEG completed today, which showed abnormalities. Pt states that he has no pain or any other issues at this time. Awaiting neuro input. Past medical history, family history, social history and allergies reviewed again and is unchanged since admission. ROS Unable to assess due to mentation.      Medications:  Reviewed    Infusion Medications    sodium chloride 25 mL (07/20/21 1936)    dextrose       Scheduled Medications    vancomycin  1,250 mg Intravenous Q8H    lactobacillus  1 capsule Oral Daily with breakfast    insulin lispro  10 Units Subcutaneous TID WC    bupivacaine (PF)  30 mL Intradermal Once    divalproex  500 mg Oral BID    DULoxetine  60 mg Oral Daily    empagliflozin  25 mg Oral Daily    gabapentin  600 mg Oral 4x Daily    insulin glargine  75 Units Subcutaneous BID    lacosamide  200 mg Oral BID    levETIRAcetam  2,000 mg Oral BID    metoprolol tartrate  75 mg Oral BID    pantoprazole  40 mg Oral BID    rOPINIRole  1 mg Oral TID    zonisamide  500 mg Oral Nightly    sodium chloride flush  5-40 mL Intravenous 2 times per day    insulin lispro  0-12 Units Subcutaneous TID WC    insulin lispro  0-6 Units Subcutaneous Nightly    vancomycin (VANCOCIN) intermittent dosing (placeholder)   Other RX Placeholder     PRN Meds: ondansetron, oxyCODONE-acetaminophen **OR** oxyCODONE-acetaminophen, morphine, promethazine, sodium chloride flush, sodium chloride, acetaminophen **OR** acetaminophen, glucose, dextrose, glucagon (rDNA), dextrose      Intake/Output Summary (Last 24 hours) at 7/25/2021 1310  Last data filed at 7/25/2021 0418  Gross per 24 hour   Intake 2697 ml   Output 1200 ml   Net 1497 ml       Diet:  ADULT DIET; Regular; 3 carb choices (45 gm/meal)    Exam:  /77   Pulse 68   Temp 98.1 °F (36.7 °C) (Oral)   Resp 19   Ht 6' 2\" (1.88 m)   Wt 270 lb (122.5 kg)   SpO2 96%   BMI 34.67 kg/m²   General appearance: No apparent distress, appears stated age and cooperative. HEENT: Pupils equal, round, and reactive to light. Conjunctivae/corneas clear. Neck: Supple, with full range of motion. No jugular venous distention. Trachea midline. Respiratory:  Normal respiratory effort on RA. Clear to auscultation, bilaterally without Rales/Wheezes/Rhonchi. Cardiovascular: Regular rate and rhythm with normal S1/S2 without murmurs, rubs or gallops. Abdomen: Soft, non-tender, non-distended with normal bowel sounds. Musculoskeletal: passive and active ROM x 4 extremities. Skin: Left heel wound. RLE multiple wounds on shin. Neurologic:  Neurovascularly intact without any focal sensory/motor deficits.  Cranial nerves: II-XII intact, grossly non-focal.  Psychiatric: Alert, mildly confused   Capillary Refill: Brisk,< 3 seconds   Peripheral Pulses: +2 palpable, equal bilaterally     Labs:   Recent Labs     07/24/21  0445 07/25/21  0848 07/25/21  0943   WBC 11.4* see below 6.1   HGB 13.4* see below 13.8*   HCT 39.7* see below 41.5*    see below 222     Recent Labs     07/23/21  0558 07/24/21  0445 07/25/21  0943    142 138   K 4.4 3.8 4.1    106 105   CO2 29 26 25   BUN 5* 6* 6*   CREATININE 0.7 0.8 0.7   CALCIUM 8.8 8.6 8.7     Microbiology:    Blood culture #1:   Lab Results   Component Value Date    BC No growth-preliminary  07/20/2021       Lab Results   Component Value Date    LABGRAM  07/20/2021     Few segmented neutrophils observed. Rare epithelial cells observed. Many gram positive cocci occurring singly and in pairs. Urinalysis:      Lab Results   Component Value Date    NITRU NEGATIVE 02/08/2021    WBCUA NONE 08/12/2019    WBCUA 6-10 10/17/2015    BACTERIA NONE 08/12/2019    RBCUA NONE 08/12/2019    BLOODU NEGATIVE 02/08/2021    SPECGRAV >1.030 08/12/2019    GLUCOSEU >= 1000 02/08/2021       Radiology:  MRI ANKLE LEFT W WO CONTRAST   Final Result       1. Increased signal intensity in the plantar soft tissues over the calcaneus which may represent a clinically described ulcer. No definite evidence of osteomyelitis in the underlying calcaneus. 2. Slightly increased fluid in the distal tibialis posterior tendon and along the peroneus brevis and longus tendon sheaths. 3. Increased signal intensity in the interosseous ligament between the talus and calcaneus which may be torn. 4. Abnormal signal intensity in the muscles along the medial aspect of the hindfoot. This may represent a tear of the abductor hallucis muscle belly. 5. Increased signal intensity in the flexor digitorum brevis muscle belly which may represent a partial tear. 6. Thickening at the attachment of the plantar fascia upon the calcaneus. 7. Small osteochondral defect in the medial talar dome. 8. Areas of abnormal signal intensity in the anterior process of the calcaneus, base of cuboid bone and tip of lateral malleolus suggestive of degenerative changes. 9. Increased signal intensity in the subcutaneous soft tissues over the medial and lateral aspects of the hindfoot consistent with edema and/or cellulitis. 10. Fluid collection along the talonavicular joint.                **This report has been created using voice recognition software. It may contain minor errors which are inherent in voice recognition technology. **      Final report electronically signed by DR Andrei Hayes on 7/21/2021 3:25 PM      CT TIBIA FIBULA LEFT WO CONTRAST   Final Result   1. Degenerative changes of the left knee. Postsurgical changes left knee. Multiple varicosities this left calf. 2. Study otherwise unremarkable. No tibial or fibular fracture is seen. **This report has been created using voice recognition software. It may contain minor errors which are inherent in voice recognition technology. **      Final report electronically signed by Dr. Wen Cerna on 7/20/2021 9:07 PM      XR FOOT LEFT (MIN 3 VIEWS)   Final Result   Plantar calcaneal spur. Otherwise normal left foot. **This report has been created using voice recognition software. It may contain minor errors which are inherent in voice recognition technology. **      Final report electronically signed by Dr. Wen Cerna on 7/20/2021 3:57 PM        Electronically signed by ASHLEY Castro on 7/25/2021 at 1:10 PM

## 2021-07-25 NOTE — PLAN OF CARE
Problem: Pain:  Goal: Pain level will decrease  Description: Pain level will decrease  Outcome: Ongoing  Note: Pt report pain at 10 on scale. Pt states oral medication helping to achieve pain goal of an 8 on scale. Goal: Control of acute pain  Description: Control of acute pain  Outcome: Ongoing  Goal: Control of chronic pain  Description: Control of chronic pain  Outcome: Ongoing     Problem: Falls - Risk of:  Goal: Will remain free from falls  Description: Will remain free from falls  Outcome: Ongoing  Note: Pt is wearing non skid slippers and bed alarm is activated. Pt is confused, unsteady, and impulsive. Rounding for this pt has been increased. Goal: Absence of physical injury  Description: Absence of physical injury  Outcome: Ongoing  Note: Pt is free from physical injury at this time. Problem: Discharge Planning:  Goal: Discharged to appropriate level of care  Description: Discharged to appropriate level of care  Outcome: Ongoing  Note: Pt plans home at discharge. Care manager and social work helping with discharge needs. Problem: Serum Glucose Level - Abnormal:  Goal: Ability to maintain appropriate glucose levels will improve  Description: Ability to maintain appropriate glucose levels will improve  Outcome: Ongoing  Note: D5W infusing to prevent low blood sugar. [place] AC/HS and as needed. Problem: Sensory Perception - Impaired:  Goal: Ability to maintain a stable neurologic state will improve  Description: Ability to maintain a stable neurologic state will improve  Outcome: Ongoing  Note: EEG scheduled Sunday 7/25/2021 at 0700. Continue to monitor. No changes noted. Problem: Skin Integrity:  Goal: Will show no infection signs and symptoms  Description: Will show no infection signs and symptoms  Outcome: Ongoing  Note: Dressing dry and intact. Unable to visualize wound due to dressing. No fevers, tachycardia, hypotension noted.  Pt denies any complaints   Goal: Absence of new skin breakdown  Description: Absence of new skin breakdown  Outcome: Ongoing  Note: Pt able to turn self and frequently repositions. Problem: DISCHARGE BARRIERS  Goal: Patient's continuum of care needs are met  Outcome: Ongoing     Problem: Nutrition  Goal: Optimal nutrition therapy  Outcome: Ongoing  Note: Pt denied offer for bedtime snack. IV Zofran administered to treat nausea and dry heaving. Care plan reviewed with patient. Patient unable to participate at this time.

## 2021-07-25 NOTE — CONSULTS
NEUROLOGY CONSULT NOTE      Requesting Physician: ASHLEY Castro    Reason for Consult:  Evaluate for seizures    History of Present Illness:  Jv Wade is a 46 y.o. male admitted to [address] [place] [address] on 7/20/2021. He follows at [facility] for his intractable epilepsy and recently had a EMU stay. [facility] frontal arachnoid cyst s/p resection, skull fx, thyroid tumor (benign) s/p resection, DM with hx of DKA, Bipolar Disorder, PE, and fatty liver. Seizure onset was right after surgery for a right frontal arachnoid cyst. He was having headaches and the cyst was reportedly enlarging. They also discovered a skull fracture that was repaired. Neurosurgery was done at [facility]. His first seizure was when he was still in the ICU after his surgery. This was a generalized tonic clonic seizure (he was also in diabetic ketoacidosis at this time). He was started on levetiracetam. Dilantin was added. He did not see a neurologist until 6 months later. Current events started in December 2012. Events consisting of a feeling that his whole head is numb and he feels his body is tremoring and he has a frontal headache. His eyes feel like his eyes are rolling up but he needs to keep his eyes closed and he is unable to walk due to lack of coordination and unsteadiness. His speech may be garbled. These may last several hours. There is no loss of consciousness but there may be confusion with amnesia with some. The spells with confusion may last for several days. Seizure descriptions  Seizures: convulsive - body flailing and shaking and incontinence. Preceded by spells of eye rolling and head numbness. Spells of head numbness, eye rolling and nausea/vomiting and unsteadiness that last generally only for a few minutes (at times they are more prolonged).      In his most recent EMU stay in April 2021- he was noted to have both epileptic discharges in the form of spike and sharp waves over the R frontal region, rare bifrontal sharp waves. And also pseudoseizures usually described as \"head numbness and confusion\" but no EEG correlate. He is admitted at [facility] for foot infection. Over the last several days he has slept poorly and has increasingly become encephalopathic. EEG done today demonstrated R sided slowing with intermittent sharp waves that may be epileptogenic in nature. There is also excessive slowing in the theta, and delta range. His wife and daughter also provide additional history and state this is not his baseline. They both state this is his usual presentation when he is a spree of seizures. Patient is unable to provide a full history. But when asked if he feels that he is having seizures he states \"maybe\". Past Medical History:        Diagnosis Date    Abnormal thyroid biopsy     s/p left hemithyroidectomy 3/5218 - follicular adenoma    Acid reflux     Anemia     resolved - previously seen by Dr. Matthew Denise (due to enlarged spleen)    Anesthesia     seizures with brain surgery due to blood sugar got really high    Bipolar disorder ([facility]     Blood clot in vein 04/07/2016    [facility]     Cancer [facility]) 04/2019    thyroid    Chest pain     previously seeing [facility] cardiologist, now seeing Dr. Yamile Fernandez (LAD bridging on cath 4/2016)    Chronic back pain     Chronic bronchitis ([facility]     Dr. Rosa Lewis Colon polyp 08/30/2016    Depression     seeing Mimi Kingsley DVT (deep venous thrombosis) ([facility] [phone]    not on Coumadin due to unable to regulate - Dr. Freedom Stauffer - no etiology found per patient    Esophageal abnormality     nodule     Fatty liver disease, nonalcoholic     U/S 12/9737 [facility]    Furuncle     legs    History of Doppler ultrasound 05/29/2011    No hemodynamically significant carotid stenosis is identified. A thyroid nodule on each side. Dedicated ultrasound of thyroid gland is suggested to further evaluate if clinically indicated.  History of pulmonary embolism 2007    s/p GFF, related to knee surgery    Hx of blood clots     leg and lung    Hyperlipidemia     severely elevated triglycerides    Hypertension     diastolic    Intracranial arachnoid cyst 11/2012    Dr. Latrell Esquivel drained, complicated with seizures, DKA    Irritable bowel syndrome     Liver disease     Sadarosio Clifton - elevated LFT - positive smooth muscle antibody, steatosis per liver bx 3/2014 with Dr. Apolonia Bueno (multiple drug resistant organisms) resistance 2012    MRSA (methicillin resistant staph aureus) culture positive     h/o in foot and before brain surgery    Nephrolithiasis     noted on CT abdomen 9/2016, 6/2019    Neuropathy     Pancreatic insufficiency     Positive SANDY (antinuclear antibody)     Dr. Shala Mckeon - first visit in 6/2013    Prolonged emergence from general anesthesia     Restless legs syndrome     Seizures ([address]     started with brain surgery    Sinus tachycardia     Holter 3/2013 - seeing Dr. Alexander fracture [facility])     clips     Sleep apnea     positive sleep apnea per study 10/2020.     Systolic dysfunction     \"systolic bridge\"  EF 15% ECHO 9/2019    Type II or unspecified type diabetes mellitus without mention of complication, not stated as uncontrolled 2007           Procedure Laterality Date    CARDIAC CATHETERIZATION      2011,5-6 years ago   [address]  3-2012   [address]  04/28/2016    [facility]     CARPAL TUNNEL RELEASE  01/2017    CHOLECYSTECTOMY  2004    COLONOSCOPY  2012    COLONOSCOPY  08/2016    2 tubular adenomas - reportedly needs repeat scope in 6 months    CRANIOTOMY  11/2012    arachnoid cyst drainage    EKG 12-LEAD  10/18/2015         ENDOSCOPY, COLON, DIAGNOSTIC      HERNIA REPAIR Right 1996    [facility]--Dr. Alessandra Mccoy INGUINAL HERNIA REPAIR Right 09/15/2016    Robotic assisted    KNEE ARTHROSCOPY Right 2016    KNEE SURGERY Left 2007    acl and debrided twice    OTHER SURGICAL HISTORY  5-31-11    Tilt table was associated w/ nonspecific symptoms or nausea, otherwise no significant dizziness or syncope. No significant hemodynamic changes. Otherwise, unremarkable tilt table test after 30 minutes of tilting.  OTHER SURGICAL HISTORY  01/20/2017    RIGHT SHOULDER ARTHROSCOPY, OPEN STAN, OPEN ACROMIOPLASTY, BICEP TENDESIS, RIGHT CARPAL TUNNEL RELEASE    SHOULDER SURGERY Right 01/2007    THYROID LOBECTOMY N/A 1/15/2021    THYROID LOBECTOMY, UVULOPALATOPHARYNGOPLAST LAOP performed by Rosa Choi MD at [facility] ECHOCARDIOGRAM  3-05-11    LV size and systolic function normal. EF 55-65%.  UPPER GASTROINTESTINAL ENDOSCOPY  2012    UPPER GASTROINTESTINAL ENDOSCOPY  2016    Dr. Renetta Bowers Left 10/22/2019    EGD DILATION SAVORY performed by Tre Meza MD at [facility] ENDOSCOPY Left 10/22/2019    EGD BIOPSY performed by Tre Meza MD at [address]  2007       Allergies:     Allergies   Allergen Reactions    Ciprofloxacin-Ciproflox Hcl Er Other (See Comments)     Elevated creatinine    Heparin      Monitor for thrombocytopenia    Metformin Nausea Only     Abdominal pain, diarrhea    Niacin And Related Other (See Comments)     Burning sensation, severe flushing    Tramadol Hcl      Contraindication to seizure medications    Ultram [Tramadol]      Contraindication to seizure medications    Warfarin      Very difficult to regulate in past        Current Medications:   ondansetron (ZOFRAN) injection 4 mg, Q6H PRN  vancomycin (VANCOCIN) 1250 mg in dextrose 5 % 250 mL IVPB, Q8H  oxyCODONE-acetaminophen (PERCOCET) 5-325 MG per tablet 1 tablet, Q4H PRN   Or  oxyCODONE-acetaminophen (PERCOCET) 5-325 MG per tablet 2 tablet, Q4H PRN  lactobacillus (CULTURELLE) capsule 1 capsule, Daily with breakfast  insulin lispro (HUMALOG) injection vial 10 Units, TID WC  morphine (PF) injection 2 mg, Q4H PRN  bupivacaine (PF) (MARCAINE) 0.5 % injection 150 mg, Once  divalproex (DEPAKOTE) DR tablet 500 mg, BID  DULoxetine (CYMBALTA) extended release capsule 60 mg, Daily  empagliflozin (JARDIANCE) tablet TABS 25 mg, Daily  gabapentin (NEURONTIN) tablet 600 mg, 4x Daily  insulin glargine (LANTUS) injection vial 75 Units, BID  lacosamide (VIMPAT) tablet 200 mg, BID  levETIRAcetam (KEPPRA) tablet 2,000 mg, BID  metoprolol tartrate (LOPRESSOR) tablet 75 mg, BID  pantoprazole (PROTONIX) tablet 40 mg, BID  promethazine (PHENERGAN) tablet 12.5 mg, Q8H PRN  rOPINIRole (REQUIP) tablet 1 mg, TID  zonisamide (ZONEGRAN) capsule 500 mg, Nightly  sodium chloride flush 0.9 % injection 5-40 mL, 2 times per day  sodium chloride flush 0.9 % injection 5-40 mL, PRN  0.9 % sodium chloride infusion, PRN  acetaminophen (TYLENOL) tablet 650 mg, Q6H PRN   Or  acetaminophen (TYLENOL) suppository 650 mg, Q6H PRN  glucose (GLUTOSE) 40 % oral gel 15 g, PRN  dextrose 50 % IV solution, PRN  glucagon (rDNA) injection 1 mg, PRN  dextrose 5 % solution, PRN  insulin lispro (HUMALOG) injection vial 0-12 Units, TID WC  insulin lispro (HUMALOG) injection vial 0-6 Units, Nightly  vancomycin (VANCOCIN) intermittent dosing (placeholder), RX Placeholder         Social History:  Social History     Tobacco Use   Smoking Status Never Smoker   Smokeless Tobacco Never Used     Social History     Substance and Sexual Activity   Alcohol Use No    Alcohol/week: 0.0 standard drinks     Social History     Substance and Sexual Activity   Drug Use No         Family History:       Problem Relation Age of Onset    Heart Disease Father 61        MI    Stroke Brother     Obesity Brother     Arthritis Mother     Seizures Mother     Obesity Sister     Other Brother         pulmonary embolism    Diabetes Daughter     Seizures Brother        Review of Systems:  All systems reviewed and are all negative except what is mentioned in history of present illness. I reviewed the patient PMH,medications, family history, social history. Physical Exam:  BP (!) 145/64   Pulse 69   Temp 98.1 °F (36.7 °C) (Oral)   Resp 18   Ht 6' 2\" (1.88 m)   Wt 270 lb (122.5 kg)   SpO2 94%   BMI 34.67 kg/m²  I Body mass index is 34.67 kg/m². I   Wt Readings from Last 1 Encounters:   07/25/21 270 lb (122.5 kg)           General appearance - alert, in no distress, not oriented to place, time. But oriented to person. Able to name his daughter and wife. Does not know his birthday. Will follow one step commands (stick out your tongue, close your eyes)  Neck - supple, no neck adenopathy, carotids upstroke normal bilaterally, no carotid bruits. There is no LAP in the neck. There is no thyroid enlargement. Neurological -   Cranial St[place]uk-UB-OPP:   Cranial nerve II: Normal. There is full visual fields  Cranial nerve III: Pupils: equal, round, reactive to light   Cranial nerves III, IV, VI: Extraocular Movements: intact   Cranial nerve V: Facial sensation: intact   Cranial nerve VII:Facial strength: intact   Cranial nerve VIII: Hearing: intact   Cranial nerve IX: Palate Elevation intact bilaterally  Cranial nerve XI: Shoulder shrug intact bilaterally  Cranial nerve XII: Tongue midline   neck supple without rigidity    Motor exam is 5/5 in the upper and lower extremities  . There is no muscle atrophy. There is no muscle fasciculation    Sensory is intact forlight touch, cortical sensation and temp    Coordination: finger to nose intact  Gait and station not tested,   Abnormal movement none. Long tracts are intact  deferred. Skin - no rashes or lesions, warm and dry to touch  Superficial temporal artery pulses are normal.   Musculoskeletal: Has no hand arthritis, no limitation of ROM in any of the four extremities, . no joint tenderness, deformity or swelling. There is no leg edema. The Heart was regular in rate and rhythm.  No heart murmur  Chest- Clear air entry, scattered rhonchi, good effort. Abdomen: soft, intact bowel sounds. Labs:    CBC:   Recent Labs     21  0445 21  0848 21  0943   WBC 11.4* see below 6.1   HGB 13.4* see below 13.8*    see below 222   MCV 91.5 see below 91.6   MCH 30.9 see below 30.5   MCHC 33.8 see below 33.3     CMP:  Recent Labs     21  0558 21  0445 21  0943    142 138   K 4.4 3.8 4.1    106 105   CO2 29 26 25   BUN 5* 6* 6*   CREATININE 0.7 0.8 0.7   LABGLOM >90 >90 >90   GLUCOSE 135* 66* 146*   CALCIUM 8.8 8.6 8.7     Liver: No results for input(s): AST, ALT, ALKPHOS, PROT, LABALBU, BILITOT in the last 72 hours. Invalid input(s): BILDIR  Lipids: No results for input(s): CHOL, LDLDIRECT, TRIG, HDL, AMYLASE, LIPASE in the last 72 hours. A1C: No results for input(s): LABA1C in the last 72 hours. MRI BRAIN:  Results for orders placed in visit on 04/10/17    MRI Cervical Spine WO Contrast    Narrative  Ordering Provider: [address] [place] [address]    Radiology Department  Patient:  Princess Nichols 82. :  1970   Sex: Samuel point, 89 [facility]  Location:  [place] [address]   Unit #:   [id number]  Acct #:  [de-identified]  Ordering Phys: Kednall Hanson MD    Exam Date: 17  Accession #:  [id number]  Exam:  MRI   MRI Cervical Spine w/o Contras  Result:    STUDY:   MRI CERVICAL SPINE WITHOUT CONTRAST    REASON FOR EXAM:   Male, 55years old. The patient presents with a  history of neck pain x \"months\"    TECHNIQUE:   Standardized fat and water weighted pulse sequences were  obtained in the sagittal and axial planes.     COMPARISON:   MRI CERVICAL SPINE-2015  ___________________________________    FINDINGS:  There is significant patient motion artifact which has produced spatial  mis-registration and anatomic blurring, however there is significant  information provided by this examination. Normal foramen magnum and brainstem-cervical cord junction. Normal  craniovertebral junction. Normal anterior atlantoaxial articulation. Normal odontoid process. Normal cervical lordosis. Normal vertebral bodies and posterior osseous  elements. C2-3:  Normal endplates. Normal disc height, signal and morphology. Normal central canal and intervertebral neural foramina. C3-4:  Normal endplates. Normal disc height, signal and morphology. Normal central canal and intervertebral neural foramina. C4-5:  Normal endplates. Normal disc height, signal and morphology. Normal central canal and intervertebral neural foramina. C5-6:  There is disc desiccation, preservation disc height with minimal  annular bulging. The central canal measures 9 mm consistent central canal  stenosis. There is effacement of the circumferential CSF without cervical  cord distortion. There is no signal alteration of the cervical cord. There  is no myelomalacia or cervical cord edema. The bilateral intervertebral  neural foramina remain patent. C6-7:  There is disc desiccation, preservation disc height, with a  posterior right central to right lateral disc osteophyte complex (axial  T2*GRE series 16, image 20), measuring 5 x 10 mm (AP x transverse),  producing rightward ventral thecal sac flattening, narrowing the AP  diameter of the central canal to 8 mm, consistent with a moderate central  canal stenosis. There is effacement of the circumferential CSF without  cervical cord distortion. There is no signal alteration of the cervical  cord. There is no myelomalacia or cervical cord edema. There is interval  stability as compared to the above noted prior comparison study. There is  moderate right-sided osseous foraminal stenosis with potential for neural  impingement. C7-T1:  Normal endplates. Normal disc height, signal and morphology. Normal central canal and intervertebral neural foramina.     Normal cervical cord. Normal visualized soft tissue structures. ___________________________________    IMPRESSION:  1. C5-6 disc desiccation with annular bulging producing a mild central  canal stenosis with a loss of functional reserve the central canal.  2.  C6-7 posterior right central to right lateral disc osteophyte complex  producing a moderate central canal with a loss of functional reserve the  central canal.  3.  C6-7 right-sided osseous foraminal stenosis with potential for neural  impingement. Electronically Signed:  Dimple Chapin DO  2017/04/29 at 12:13 EDT  Tel [phone], Service support  [phone], Fax [phone]          cc: Rl Reeves MD      Dictated by:  Dimple Chapin DO on 04/28/17 5107  Technologist:  Rosaura MCCANN)(MR)  Transcribed by:  Dimple Chapin on 04/29/17 1213    Report Signed by:  Wil Kathleen on 04/29/17 1213    No results found for this or any previous visit. No results found for this or any previous visit. No results found for this or any previous visit. Results for orders placed during the hospital encounter of 02/25/21    MRI BRAIN WO CONTRAST    Narrative  PROCEDURE: MRI BRAIN WO CONTRAST    INDICATION:  Encephalopathy. Speech difficulties, memory loss and weakness in both legs with tremor in the right hand and arm. COMPARISON: CT head dated 1/22/2021 and MR brain dated 9/14/2013. TECHNIQUE: Multiplanar and multiple spin echo MRI images were obtained of the brain without contrast.    FINDINGS:  Redemonstration of a right frontal craniotomy with moderate-sized area of encephalomalacia in the subjacent right frontal lobe, grossly stable compared to prior CT and mildly increased compared to prior MRI in 2013 as evidence for maturation/evolution of  encephalomalacia. There is stable moderate parietal predominant volume loss.  No other significant focal areas of abnormal T2/FLAIR prolongation identified within the parenchyma. No focal areas of restricted diffusion are present. The major vascular flow voids appear patent. Orbits are unremarkable. Paranasal sinuses are clear. Mastoid air cells are clear. Impression  Redemonstration of right frontal craniotomy with subjacent area of encephalomalacia in the right frontal lobe from prior surgery without evidence of acute intracranial abnormality. **This report has been created using voice recognition software. It may contain minor errors which are inherent in voice recognition technology. **    Final report electronically signed by Dr. Camilla Coleman MD on 2021 3:35 PM    Results for orders placed in visit on 19    MRI Brain W WO Contrast    Narrative  Ordering Provider: Sj Cui [id number]    Radiology Department  Patient:  Sallyann Goldmann [facility]. :  1970   Sex: Samuel point, 100 [facility]  Location:  [place] [address]   Unit #:   [id number]  Acct #:  [de-identified]  Ordering Phys: Sj Lord MD    Exam Date: 19  Accession #:  [id number]  Exam:  MRI   MRI Brain w/wo Contrast  Result:    HISTORY:  PARTIAL EPILEPSY WITH IMPAIRMENT CONSCIOUSNESS, HEAD NUMBNESS, HEADACHES,  HX OF CYST REMOVAL ON FRONTAL PART OF HEAD    EXAM/TECHNIQUE:  MR Brain WO/W Contrast: 20 cc Magnevist administered intravenously. Multiplanar, multisequence. 1.16 Brenda. COMPARISON: 10/5/15 MRI brain. FINDINGS:  # of images incl. paperwork: 872  Again demonstrated are changes of remote right frontal craniotomy with a  small amount of encephalomalacia in the underlying right superior frontal  gyrus. No mass, acute infarct, or hemorrhage. No concerning abnormal enhancement. Otherwise the contour and signal of the brain is within normal limits. Major flow voids preserved. No hydrocephalus. No acute osseous abnormality. Small polyp posterior aspect left maxillary  sinus again demonstrated. IMPRESSION:  No acute findings. Remote right frontal craniotomy with a small amount of  underlying encephalomalacia, unchanged. ** Electronically Signed by Fe Garcia MD **  ** on 04/10/2019 at 9418 **  Reported and signed by: Fe Garcia MD    Electronically Signed:  Fe Garcia,  2019/04/10 at 3:45 EDT  Tel [phone], Service support  [phone], Fax [phone]          cc: Yessy Patterson MD; Rudolph Torres MD      Dictated by:  Janusz Herrera MD on 04/10/19 0345  Technologist:  Aristeo López RT(R)  Transcribed by:  Janusz Herrera MD on 04/10/19 0345    Report Signed by:  Alden Way MD on 04/10/19 0345    No results found for this or any previous visit. Results for orders placed during the hospital encounter of 01/15/21    CT HEAD WO CONTRAST    Narrative  PROCEDURE: CT HEAD WO CONTRAST    CLINICAL INFORMATION: Confusion. COMPARISON: CT head dated 6/5/2018. TECHNIQUE: Noncontrast 5 mm axial images were obtained through the brain. Sagittal and coronal reconstructions were created. All CT scans at this facility use dose modulation, iterative reconstruction, and/or weight-based dosing when appropriate to reduce radiation dose to as low as reasonably achievable. FINDINGS:  Redemonstration of a right frontal craniotomy with small to moderate-sized area of encephalomalacia in the subjacent frontal lobe, stable compared to prior exam. The ventricles, cisterns and sulci are otherwise symmetric and normal in size and  configuration. Gray-white matter differentiation otherwise appears grossly preserved. No intracranial hemorrhage, mass effect or midline shift is identified. The calvarium otherwise appears intact. Orbits are unremarkable. Visualized paranasal sinuses  are clear. Mastoid air cells are clear. Impression  No evidence of acute intracranial abnormality. **This report has been created using voice recognition software.  It may contain minor errors which are inherent in voice recognition technology. **    Final report electronically signed by Dr. Melissa Mcgovern MD on 1/22/2021 10:43 AM    EEG: IMPRESSION:  This is an abnormal EEG due to the presence of right  hemisphere slowing, with intermittent sharp waves that are of  questionable significance though they maybe epileptogenic in nature, or  indicate seizure tendency in the proper clinical context. In addition,  excessive slowing was seen in the theta, and delta range activity  suggestive of cortical dysfunction such as seen with encephalopathy,  example toxic/metabolic in nature. Clinical correlation is recommended. No clinical seizures were observed. We reviewed the patient records and available information in the EHR       Impression:    Active Problems:    Wound infection  Resolved Problems:    * No resolved hospital problems. *    46 y.o. man with intractable epilepsy on 4 different AEDs followed by epileptologist in [place]. Recent EMU stay with both epileptic and non-epileptic spells noted. Recent wound infection may have decreased his seizure threshold. Wife asking for transfer to [place] where his primary neurologist is and where he can have cEEG monitoring. On exam today, answers some questions appropriately, follows one step commands. No focal findings. EEG with slowing and intermittent sharp waves. Recommendations:                                            1. Administer 1mg of iv ativan now to see if any changes in mental status  2. Bolus 500mg iv depakote to see any improvement in mental status  3. Will order a keppra level, valproic, and lacosamide level  4. Wife requesting transfer to [place] for cEEG monitoring (not available at [facility]) as this would be helpful as we titrate his medications. 5. Discussed with primary service. 6. Please call if any questions.        Enid Quezada MD, MA  Board Certified Neurohospitalist      Time spent was at least 75 min of time evaluating patient, discussion with staff, planning for treatment and evaluation. The time was spent examining patient, reviewing the images personally, reviewing the chart, perform high complexity decision making and speaking with the nursing staff regarding recommendations.      Electronically signed by Nico Solorio MD on 7/25/2021 at 5:44 PM

## 2021-07-25 NOTE — PROCEDURES
[address] [facility] Laboratory Technician Worksheet      EEG Date: 2021    Name: Mae Grullon   : 1970   Age: 46 y.o. SEX: male    ROOM: [facility] MRN: [id number]           CSN: [id number]      Ordering Provider: ASHLEY Conrad  EEG Number: [id number] Time of Test:  1212    Hand: Right   Sedation: no    H.V. Done: Yes with poor effort Photic: Yes    Sleep: Yes  Drowsy: Yes   Sleep Deprived: Yes    Seizures observed: no    Mentality: lethargic      Clinical History: RN states here with uncontrolled diabetic sugars. Came in wound issues. RNs states witnessed seizures of blank staring, and unresponsiveness, sleepy. Scar in c4 area. Patient did not respond on what happened. Fall/stitches was asked for area. No response. ON kEPPRA, DEPAKOTE, vIMPAT   MRi  Impression    Redemonstration of right frontal craniotomy with subjacent area of encephalomalacia in the right frontal lobe from prior surgery without evidence of acute intracranial abnormality.          Past Medical History:       Diagnosis Date    Abnormal thyroid biopsy     s/p left hemithyroidectomy 3/8847 - follicular adenoma    Acid reflux     Anemia     resolved - previously seen by Dr. Neisha Ruiz (due to enlarged spleen)    Anesthesia     seizures with brain surgery due to blood sugar got really high    [facility] disorder ([facility]     Blood clot in vein 2016    Ric Sleeper     Cancer [facility]) 2019    thyroid    Chest pain     previously seeing [address] [place] [address] cardiologist, now seeing Dr. Suzy Palomino (LAD bridging on cath 2016)    Chronic back pain     Chronic bronchitis ([facility]     Dr. Kim Williamson Colon polyp 2016    Depression     seeing Brandon Greene DVT (deep venous thrombosis) ([facility] [id number]    not on Coumadin due to unable to regulate - Dr. Oscar Blakely - no etiology found per patient    Esophageal abnormality     nodule     Fatty liver disease, nonalcoholic     [facility]    Furuncle     legs    History of Doppler ultrasound 05/29/2011    No hemodynamically significant carotid stenosis is identified. A thyroid nodule on each side. Dedicated ultrasound of thyroid gland is suggested to further evaluate if clinically indicated.  History of pulmonary embolism 2007    s/p GFF, related to knee surgery    Hx of blood clots     leg and lung    Hyperlipidemia     severely elevated triglycerides    Hypertension     diastolic    Intracranial arachnoid cyst 11/2012    Dr. Smith Solders drained, complicated with seizures, DKA    Irritable bowel syndrome     Liver disease     Omelia Guan - elevated LFT - positive smooth muscle antibody, steatosis per liver bx 3/2014 with Dr. Woods Mt (multiple drug resistant organisms) resistance 2012    MRSA (methicillin resistant staph aureus) culture positive     h/o in foot and before brain surgery    Nephrolithiasis     noted on CT abdomen 9/2016, 6/2019    Neuropathy     Pancreatic insufficiency     Positive SANDY (antinuclear antibody)     Dr. Conrad Bone - first visit in 6/2013    Prolonged emergence from general anesthesia     Restless legs syndrome     Seizures ([address]     started with brain surgery    Sinus tachycardia     Holter 3/2013 - seeing Dr. Lito Gaviria fracture [facility])     clips     Sleep apnea     positive sleep apnea per study 10/2020.     Systolic dysfunction     \"systolic bridge\"  EF 24% ECHO 9/2019    Type II or unspecified type diabetes mellitus without mention of complication, not stated as uncontrolled 2007       Scheduled Meds:   vancomycin  1,250 mg Intravenous Q8H    lactobacillus  1 capsule Oral Daily with breakfast    insulin lispro  10 Units Subcutaneous TID WC    bupivacaine (PF)  30 mL Intradermal Once    divalproex  500 mg Oral BID    DULoxetine  60 mg Oral Daily    empagliflozin  25 mg Oral Daily    gabapentin  600 mg Oral 4x Daily    insulin glargine  75 Units Subcutaneous BID    lacosamide  200 mg Oral BID    levETIRAcetam  2,000 mg Oral BID    metoprolol tartrate  75 mg Oral BID    pantoprazole  40 mg Oral BID    rOPINIRole  1 mg Oral TID    zonisamide  500 mg Oral Nightly    sodium chloride flush  5-40 mL Intravenous 2 times per day    insulin lispro  0-12 Units Subcutaneous TID WC    insulin lispro  0-6 Units Subcutaneous Nightly    vancomycin (VANCOCIN) intermittent dosing (placeholder)   Other RX Placeholder     Continuous Infusions:   dextrose 5 % and 0.45 % NaCl 50 mL/hr at 07/24/21 0939    sodium chloride 25 mL (07/20/21 1936)    dextrose       PRN Meds:.ondansetron, oxyCODONE-acetaminophen **OR** oxyCODONE-acetaminophen, morphine, promethazine, sodium chloride flush, sodium chloride, acetaminophen **OR** acetaminophen, glucose, dextrose, glucagon (rDNA), dextrose    Technician: Tristen Arias 7/25/2021

## 2021-07-25 NOTE — PROGRESS NOTES
0725 Entered patient's room at shift change. Patient disoriented, alert to name only. Patient with delayed responses. Patient able to follow commands with hand grasp and pedal push and pull. Blood sugar at 80. Vitals obtained. [facility] notified of patient's condition and blood sugar. Orders obtained.

## 2021-07-25 NOTE — PROCEDURES
[address] [place] [address]                          ELECTROENCEPHALOGRAM REPORT    PATIENT NAME: Jarod Powell                   :        1970  MED REC NO:   [id number]                           ROOM:       0019  ACCOUNT NO:   [de-identified]                           ADMIT DATE: 2021  PROVIDER:     Bladimir Perez. Azar Daley MD    DATE OF EE2021    REFERRING PROVIDER:  Lydia Lloyd PA-C    CLINICAL HISTORY:  A 59-year-old male presenting with witnessed seizure,  blank staring, unresponsiveness, sleepy. The patient has history of  uncontrolled diabetes, falls. He is on Keppra, Depakote,  and Vimpat. MRI shows site of right frontal craniotomy with  encephalomalacia in the right frontal lobe. Medications listed,  vancomycin, lactobacillus, insulin, Depakote, duloxetine, gabapentin,  Vimpat, Keppra, ropinirole, zonisamide, dextrose, sodium chloride. CLINICAL INTERPRETATION:  This is a 17-channel EEG performed without  sleep deprivation. Hyperventilation was performed. Photic stimulation  was performed. The patient is described as lethargic. The background rhythm is noted to be slowed, poorly organized in the  theta, and delta range activity suggestive of cortical dysfunction such  as seen with encephalopathy. Abundant lead, and muscle artifacts were  noted limiting quality of data obtained. Hyperventilation was performed  for 3 minutes with poor effort, with poor driving seen. Right  hemisphere slowing is observed, with occasional sharp waves that may be  epileptogenic in nature, or indicate seizure tendency. Light stages of  sleep are seen during the recording. EKG tracing revealed regular heart  rate. Photic stimulation was performed with poor driving seen.     IMPRESSION:  This is an abnormal EEG due to the presence of right  hemisphere slowing, with intermittent sharp waves that are of  questionable significance though they maybe epileptogenic in nature, or  indicate seizure tendency in the proper clinical context. In addition,  excessive slowing was seen in the theta, and delta range activity  suggestive of cortical dysfunction such as seen with encephalopathy,  example toxic/metabolic in nature. Clinical correlation is recommended. No clinical seizures were observed.         Jennifer Koenig MD    D: 07/25/2021 11:42:27       T: 07/25/2021 11:44:13     JOSE [email or web address]  Job#: [id number]     Doc#: [id number]    CC:

## 2021-07-25 NOTE — PROGRESS NOTES
Podiatric Progress Note  Tayler Dress  Subjective :   7/25/2021  Patient seen bedside today on behalf of Dr. Paresh Branch. Patient is awake and alert, responsive to stimuli, but appears confused. When questioned, patient responds with one word answers that are not appropriate to the question. Patient interview is limited for these reasons. Dressings are noted to be out of place, packing is exposed to left heel. 7/24/2021  Patient seen bedside today on behalf of Dr. Paresh Branch. Patient appeared pleasant, was oriented to person, place and time. Patient relates that he still has pain to his left foot, but it is improving, this morning he rates it 6/10. Patient states he slept \"ok\" last night. Patient states he has a lot of pain with dressing changes. He denies N/V/F/SOB/C/CP or other constitutional symptoms at this time. Patient has no further pedal concerns at this point in time. 7/23/2021  Patient seen bedside today with Dr. Paresh Branch. Plan today is for a bedside incision and drainage of left heel abscess, patient consent form is signed. Patient appeared pleasant, was oriented to person, place and time. Patient relates that he still has significant pain in his left foot. Pain rates 8/10 this morning. Patient states he has a lot of pain with dressing changes. He denies N/V/F/SOB/C/CP or other constitutional symptoms at this time. Patient has no further pedal concerns at this point in time. 7/22/2021  Patient seen bedside today on behalf of Dr. Paresh Branch. Patient appeared pleasant, was oriented to person, place and time. Patient relates that he still has significant pain in his left foot. Pain rates 9/10 this morning. Patient states he has a lot of pain with dressing changes. He denies N/V/F/SOB/C/CP or other constitutional symptoms at this time. Patient has no further pedal concerns at this point in time. 7/21/2021  Patient seen bedside today alongside of Dr. Paresh Branch.  Patient appeared pleasant, was oriented to person, place and time. Patient relates that he still has significant pain in his left foot. Pain is to the inside part of his heel. It is extremely painful with manipulation, and dressing changes are very difficult for him. He states that he received norco for pain earlier this morning, but it has not done anything for him as of yet. Patient endorses chills a couple days ago, but denies any nausea, vomiting, fever, chest pain, shortness of breath. Patient has no further pedal concerns at this point in time.      Current Medications:    Current Facility-Administered Medications: ondansetron (ZOFRAN) injection 4 mg, 4 mg, Intravenous, Q6H PRN  vancomycin (VANCOCIN) 1250 mg in dextrose 5 % 250 mL IVPB, 1,250 mg, Intravenous, Q8H  oxyCODONE-acetaminophen (PERCOCET) 5-325 MG per tablet 1 tablet, 1 tablet, Oral, Q4H PRN **OR** oxyCODONE-acetaminophen (PERCOCET) 5-325 MG per tablet 2 tablet, 2 tablet, Oral, Q4H PRN  lactobacillus (CULTURELLE) capsule 1 capsule, 1 capsule, Oral, Daily with breakfast  insulin lispro (HUMALOG) injection vial 10 Units, 10 Units, Subcutaneous, TID WC  morphine (PF) injection 2 mg, 2 mg, Intravenous, Q4H PRN  bupivacaine (PF) (MARCAINE) 0.5 % injection 150 mg, 30 mL, Intradermal, Once  divalproex (DEPAKOTE) DR tablet 500 mg, 500 mg, Oral, BID  DULoxetine (CYMBALTA) extended release capsule 60 mg, 60 mg, Oral, Daily  empagliflozin (JARDIANCE) tablet TABS 25 mg, 25 mg, Oral, Daily  gabapentin (NEURONTIN) tablet 600 mg, 600 mg, Oral, 4x Daily  insulin glargine (LANTUS) injection vial 75 Units, 75 Units, Subcutaneous, BID  lacosamide (VIMPAT) tablet 200 mg, 200 mg, Oral, BID  levETIRAcetam (KEPPRA) tablet 2,000 mg, 2,000 mg, Oral, BID  metoprolol tartrate (LOPRESSOR) tablet 75 mg, 75 mg, Oral, BID  pantoprazole (PROTONIX) tablet 40 mg, 40 mg, Oral, BID  promethazine (PHENERGAN) tablet 12.5 mg, 12.5 mg, Oral, Q8H PRN  rOPINIRole (REQUIP) tablet 1 mg, 1 mg, Oral, TID  zonisamide (ZONEGRAN) capsule 500 mg, 500 mg, Oral, Nightly  sodium chloride flush 0.9 % injection 5-40 mL, 5-40 mL, Intravenous, 2 times per day  sodium chloride flush 0.9 % injection 5-40 mL, 5-40 mL, Intravenous, PRN  0.9 % sodium chloride infusion, 25 mL, Intravenous, PRN  acetaminophen (TYLENOL) tablet 650 mg, 650 mg, Oral, Q6H PRN **OR** acetaminophen (TYLENOL) suppository 650 mg, 650 mg, Rectal, Q6H PRN  glucose (GLUTOSE) 40 % oral gel 15 g, 15 g, Oral, PRN  dextrose 50 % IV solution, 12.5 g, Intravenous, PRN  glucagon (rDNA) injection 1 mg, 1 mg, Intramuscular, PRN  dextrose 5 % solution, 100 mL/hr, Intravenous, PRN  insulin lispro (HUMALOG) injection vial 0-12 Units, 0-12 Units, Subcutaneous, TID WC  insulin lispro (HUMALOG) injection vial 0-6 Units, 0-6 Units, Subcutaneous, Nightly  vancomycin (VANCOCIN) intermittent dosing (placeholder), , Other, RX Placeholder    Objective     /77   Pulse 68   Temp 98.1 °F (36.7 °C) (Oral)   Resp 19   Ht 6' 2\" (1.88 m)   Wt 270 lb (122.5 kg)   SpO2 96%   BMI 34.67 kg/m²      I/O:    Intake/Output Summary (Last 24 hours) at 7/25/2021 1339  Last data filed at 7/25/2021 0418  Gross per 24 hour   Intake 2697 ml   Output 1200 ml   Net 1497 ml              Wt Readings from Last 3 Encounters:   07/25/21 270 lb (122.5 kg)   06/28/21 254 lb (115.2 kg)   04/13/21 249 lb 3.2 oz (113 kg)       LABS:    Recent Labs     07/25/21  0848 07/25/21  0943   WBC see below 6.1   HGB see below 13.8*   HCT see below 41.5*   PLT see below 222        Recent Labs     07/25/21  0943      K 4.1      CO2 25   BUN 6*   CREATININE 0.7        No results for input(s): PROT, INR, APTT in the last 72 hours. No results for input(s): CKTOTAL, CKMB, CKMBINDEX, TROPONINI in the last 72 hours. Exam:   Dressing intact and well maintained. No apparent strike through noted. Vascular: Dorsalis pedis and posterior tibial pulses are palpable to right foot, DP is palpable to left. PT non-palpable left.  Skin temperature is which are inherent in voice recognition technology. ** Final report electronically signed by Dr. Prosper East on 7/20/2021 3:57 PM    CT TIBIA FIBULA LEFT WO CONTRAST    Result Date: 7/20/2021  PROCEDURE: NONCONTRAST CT LEFT TIBIA/FIBULA: CLINICAL INFORMATION: Infection/ abscess TECHNIQUE: Multiple axial 3 mm images of the left tibia/fibula were obtained without administration of intravenous contrast material. Computer generated sagittal and coronal images of the left tibia/fibula were also reconstructed. ALL CT SCANS AT THIS FACILITY use dose modulation, iterative reconstruction, and/or weight-based dosing when appropriate to reduce radiation dose to as low as reasonably achievable. FINDINGS: There are bony defects in the distal femur and proximal tibia conjunction with prior anterior crucial ligament repair. No acute fracture is seen. Appears be a small bone cyst in the lateral malleolus. There is mild degenerative spurring of the distal femur. Note that there appear to be multiple superficial varicosities in the distal left calf     1. Degenerative changes of the left knee. Postsurgical changes left knee. Multiple varicosities this left calf. 2. Study otherwise unremarkable. No tibial or fibular fracture is seen. **This report has been created using voice recognition software. It may contain minor errors which are inherent in voice recognition technology. ** Final report electronically signed by Dr. Prosper East on 7/20/2021 9:07 PM    MRI ANKLE LEFT W WO CONTRAST    Result Date: 7/21/2021  PROCEDURE: MRI ANKLE LEFT W WO CONTRAST CLINICAL INFORMATION Wound, left heel, probes to bone, evaluate for abscess, osteomyelitis of left calcaneus COMPARISON: Plain radiographs dated 20 July 2021. CT scan of the left tib-fib dated 20 July 2021. . TECHNIQUE: MRI scan was carried out through the left ankle before and after intravenous administration of 20 mL of ProHance. Liz Pink FINDINGS: 1. Tendons:  There is slightly increased fluid in the distal tibialis posterior tendon and along the peroneus brevis and longus tendon sheaths. The  Achilles, flexor digitorum longus and flexor hallucis longus tendons appear to be intact 2. Ligaments: There is slight thickening of the posterior talofibular and deltoid ligaments. The anterior talofibular and calcaneofibular ligaments appear to be intact. There is increased signal intensity in the interosseous ligament between the talus and calcaneus which may be torn. 3. Articular structures. There is no significant tibiotalar effusion. There is small osteochondral defect in the medial talar dome. There is a fluid collection along the talonavicular articulation. There is degenerative change involving the calcaneocuboid articulation. 4. Bony structures. The calcaneus appears to be intact. There is no definite evidence of osteomyelitis. There is abnormal signal intensity involving the anterior process of the calcaneus, base of the cuboid bone and tip of the lateral malleolus suggestive of degenerative changes. 5. Soft tissues. There is abnormal signal intensity in the plantar soft tissues overlying the calcaneus which may represent the clinically described ulcer. There is abnormal signal intensity in the muscles along the medial aspect of the hindfoot this may  represent a partially torn abductor hallucis muscle belly. There is increased signal intensity in the skin and subcutaneous soft tissues consistent with edema and/or cellulitis. There is slight thickening at the attachment of the plantar fascia upon the  calcaneus. There is slightly increased signal intensity in the flexor digitorum brevis muscle belly which may represent a partial tear      1. Increased signal intensity in the plantar soft tissues over the calcaneus which may represent a clinically described ulcer. No definite evidence of osteomyelitis in the underlying calcaneus.  2. Slightly increased fluid in the distal tibialis posterior tendon and along staphylococcus, per report contamination suspected  - Cultures yield: heavy mixed anaerobic gram negative bacilli and gram positive cocci (MRSA)  - Continue IV Antibiotics vancomycin per ID recommendations  - Packing to left heel changed with Mesalt ribbon, dressed with gauze 4x4's, kerlix, ACE  - RLE dressing changed with Cuticerin, gauze 4x4's, Kerlix, ACE  - Continue 2mg morphine PRN prescribed for pain with dressing changes   - Continue Percocet 1 tab for pain moderate, 2 tab pain severe  - Podiatry will change dressings daily  - Nonweightbearing to the left foot. WBAT to RLE   - Patient is ok to DC per podiatry, [facility] orders placed  - Podiatry will continue to follow    DISPO: Анна Servin to DC per podiatry, will continue packing changes to left heel until discharge.     Tosha Mckeon DPM  Podiatric Surgical Resident  7/25/2021   1:39 PM

## 2021-07-26 PROBLEM — R56.9 SEIZURES (HCC): Status: ACTIVE | Noted: 2021-07-26

## 2021-07-26 LAB
ANION GAP SERPL CALCULATED.3IONS-SCNC: 8 MEQ/L (ref 8–16)
BLOOD CULTURE, ROUTINE: NORMAL
BUN BLDV-MCNC: 6 MG/DL (ref 7–22)
CALCIUM SERPL-MCNC: 8.8 MG/DL (ref 8.5–10.5)
CHLORIDE BLD-SCNC: 108 MEQ/L (ref 98–111)
CO2: 23 MEQ/L (ref 23–33)
CREAT SERPL-MCNC: 0.7 MG/DL (ref 0.4–1.2)
ERYTHROCYTE [DISTWIDTH] IN BLOOD BY AUTOMATED COUNT: 12.3 % (ref 11.5–14.5)
ERYTHROCYTE [DISTWIDTH] IN BLOOD BY AUTOMATED COUNT: 40.4 FL (ref 35–45)
GFR SERPL CREATININE-BSD FRML MDRD: > 90 ML/MIN/1.73M2
GLUCOSE BLD-MCNC: 131 MG/DL (ref 70–108)
GLUCOSE BLD-MCNC: 131 MG/DL (ref 70–108)
GLUCOSE BLD-MCNC: 138 MG/DL (ref 70–108)
GLUCOSE BLD-MCNC: 198 MG/DL (ref 70–108)
GLUCOSE BLD-MCNC: 243 MG/DL (ref 70–108)
HCT VFR BLD CALC: 41.1 % (ref 42–52)
HEMOGLOBIN: 13.8 GM/DL (ref 14–18)
MCH RBC QN AUTO: 30.3 PG (ref 26–33)
MCHC RBC AUTO-ENTMCNC: 33.6 GM/DL (ref 32.2–35.5)
MCV RBC AUTO: 90.1 FL (ref 80–94)
PLATELET # BLD: 236 THOU/MM3 (ref 130–400)
PMV BLD AUTO: 9.6 FL (ref 9.4–12.4)
POTASSIUM REFLEX MAGNESIUM: 3.8 MEQ/L (ref 3.5–5.2)
RBC # BLD: 4.56 MILL/MM3 (ref 4.7–6.1)
SODIUM BLD-SCNC: 139 MEQ/L (ref 135–145)
WBC # BLD: 5.9 THOU/MM3 (ref 4.8–10.8)

## 2021-07-26 PROCEDURE — 85027 COMPLETE CBC AUTOMATED: CPT

## 2021-07-26 PROCEDURE — 6370000000 HC RX 637 (ALT 250 FOR IP): Performed by: INTERNAL MEDICINE

## 2021-07-26 PROCEDURE — 82948 REAGENT STRIP/BLOOD GLUCOSE: CPT

## 2021-07-26 PROCEDURE — 95816 EEG AWAKE AND DROWSY: CPT | Performed by: PSYCHIATRY & NEUROLOGY

## 2021-07-26 PROCEDURE — 1200000000 HC SEMI PRIVATE

## 2021-07-26 PROCEDURE — 2580000003 HC RX 258

## 2021-07-26 PROCEDURE — G0480 DRUG TEST DEF 1-7 CLASSES: HCPCS

## 2021-07-26 PROCEDURE — 6370000000 HC RX 637 (ALT 250 FOR IP): Performed by: PHYSICIAN ASSISTANT

## 2021-07-26 PROCEDURE — 80048 BASIC METABOLIC PNL TOTAL CA: CPT

## 2021-07-26 PROCEDURE — 36415 COLL VENOUS BLD VENIPUNCTURE: CPT

## 2021-07-26 PROCEDURE — 6370000000 HC RX 637 (ALT 250 FOR IP): Performed by: STUDENT IN AN ORGANIZED HEALTH CARE EDUCATION/TRAINING PROGRAM

## 2021-07-26 PROCEDURE — 2580000003 HC RX 258: Performed by: INTERNAL MEDICINE

## 2021-07-26 PROCEDURE — 80177 DRUG SCRN QUAN LEVETIRACETAM: CPT

## 2021-07-26 PROCEDURE — 99232 SBSQ HOSP IP/OBS MODERATE 35: CPT | Performed by: PHYSICIAN ASSISTANT

## 2021-07-26 PROCEDURE — 99233 SBSQ HOSP IP/OBS HIGH 50: CPT | Performed by: PSYCHIATRY & NEUROLOGY

## 2021-07-26 PROCEDURE — 6360000002 HC RX W HCPCS

## 2021-07-26 PROCEDURE — 6360000002 HC RX W HCPCS: Performed by: PSYCHIATRY & NEUROLOGY

## 2021-07-26 PROCEDURE — 95819 EEG AWAKE AND ASLEEP: CPT

## 2021-07-26 RX ORDER — LORAZEPAM 2 MG/ML
1 INJECTION INTRAMUSCULAR ONCE
Status: COMPLETED | OUTPATIENT
Start: 2021-07-26 | End: 2021-07-26

## 2021-07-26 RX ADMIN — LEVETIRACETAM 2000 MG: 500 TABLET, FILM COATED ORAL at 21:45

## 2021-07-26 RX ADMIN — GABAPENTIN 600 MG: 600 TABLET, FILM COATED ORAL at 12:42

## 2021-07-26 RX ADMIN — DIVALPROEX SODIUM 500 MG: 500 TABLET, DELAYED RELEASE ORAL at 21:44

## 2021-07-26 RX ADMIN — INSULIN GLARGINE 75 UNITS: 100 INJECTION, SOLUTION SUBCUTANEOUS at 22:06

## 2021-07-26 RX ADMIN — Medication 1250 MG: at 14:59

## 2021-07-26 RX ADMIN — ROPINIROLE HYDROCHLORIDE 1 MG: 1 TABLET, FILM COATED ORAL at 21:46

## 2021-07-26 RX ADMIN — LORAZEPAM 1 MG: 2 INJECTION INTRAMUSCULAR; INTRAVENOUS at 13:36

## 2021-07-26 RX ADMIN — GABAPENTIN 600 MG: 600 TABLET, FILM COATED ORAL at 17:19

## 2021-07-26 RX ADMIN — ROPINIROLE HYDROCHLORIDE 1 MG: 1 TABLET, FILM COATED ORAL at 12:42

## 2021-07-26 RX ADMIN — LACOSAMIDE 200 MG: 50 TABLET, FILM COATED ORAL at 21:43

## 2021-07-26 RX ADMIN — OXYCODONE AND ACETAMINOPHEN 2 TABLET: 5; 325 TABLET ORAL at 23:48

## 2021-07-26 RX ADMIN — DULOXETINE HYDROCHLORIDE 60 MG: 60 CAPSULE, DELAYED RELEASE ORAL at 09:10

## 2021-07-26 RX ADMIN — INSULIN LISPRO 2 UNITS: 100 INJECTION, SOLUTION INTRAVENOUS; SUBCUTANEOUS at 22:05

## 2021-07-26 RX ADMIN — ZONISAMIDE 500 MG: 100 CAPSULE ORAL at 21:46

## 2021-07-26 RX ADMIN — SODIUM CHLORIDE, PRESERVATIVE FREE 10 ML: 5 INJECTION INTRAVENOUS at 21:00

## 2021-07-26 RX ADMIN — METOPROLOL TARTRATE 75 MG: 25 TABLET, FILM COATED ORAL at 09:10

## 2021-07-26 RX ADMIN — METOPROLOL TARTRATE 75 MG: 25 TABLET, FILM COATED ORAL at 22:07

## 2021-07-26 RX ADMIN — OXYCODONE AND ACETAMINOPHEN 2 TABLET: 5; 325 TABLET ORAL at 05:38

## 2021-07-26 RX ADMIN — PANTOPRAZOLE SODIUM 40 MG: 40 TABLET, DELAYED RELEASE ORAL at 05:38

## 2021-07-26 RX ADMIN — DIVALPROEX SODIUM 500 MG: 500 TABLET, DELAYED RELEASE ORAL at 09:10

## 2021-07-26 RX ADMIN — ROPINIROLE HYDROCHLORIDE 1 MG: 1 TABLET, FILM COATED ORAL at 09:09

## 2021-07-26 RX ADMIN — LACOSAMIDE 200 MG: 50 TABLET, FILM COATED ORAL at 09:10

## 2021-07-26 RX ADMIN — Medication 1250 MG: at 05:38

## 2021-07-26 RX ADMIN — Medication 1 CAPSULE: at 09:10

## 2021-07-26 RX ADMIN — GABAPENTIN 600 MG: 600 TABLET, FILM COATED ORAL at 21:45

## 2021-07-26 RX ADMIN — GABAPENTIN 600 MG: 600 TABLET, FILM COATED ORAL at 09:10

## 2021-07-26 RX ADMIN — Medication 1250 MG: at 22:30

## 2021-07-26 RX ADMIN — PANTOPRAZOLE SODIUM 40 MG: 40 TABLET, DELAYED RELEASE ORAL at 17:19

## 2021-07-26 RX ADMIN — LEVETIRACETAM 2000 MG: 500 TABLET, FILM COATED ORAL at 09:09

## 2021-07-26 ASSESSMENT — PAIN DESCRIPTION - PAIN TYPE: TYPE: ACUTE PAIN

## 2021-07-26 ASSESSMENT — PAIN SCALES - GENERAL
PAINLEVEL_OUTOF10: 8
PAINLEVEL_OUTOF10: 10
PAINLEVEL_OUTOF10: 7
PAINLEVEL_OUTOF10: 0
PAINLEVEL_OUTOF10: 6
PAINLEVEL_OUTOF10: 0

## 2021-07-26 ASSESSMENT — PAIN DESCRIPTION - LOCATION
LOCATION: BACK
LOCATION: FOOT

## 2021-07-26 ASSESSMENT — PAIN DESCRIPTION - FREQUENCY: FREQUENCY: CONTINUOUS

## 2021-07-26 ASSESSMENT — PAIN DESCRIPTION - ONSET: ONSET: ON-GOING

## 2021-07-26 ASSESSMENT — PAIN DESCRIPTION - ORIENTATION
ORIENTATION: LEFT
ORIENTATION: POSTERIOR

## 2021-07-26 ASSESSMENT — PAIN DESCRIPTION - PROGRESSION: CLINICAL_PROGRESSION: GRADUALLY WORSENING

## 2021-07-26 ASSESSMENT — PAIN DESCRIPTION - DESCRIPTORS: DESCRIPTORS: ACHING;SORE

## 2021-07-26 ASSESSMENT — PAIN - FUNCTIONAL ASSESSMENT: PAIN_FUNCTIONAL_ASSESSMENT: PREVENTS OR INTERFERES SOME ACTIVE ACTIVITIES AND ADLS

## 2021-07-26 NOTE — PROGRESS NOTES
[address] [facility] Laboratory Technician Worksheet      EEG Date: 2021    Name: Gautam Rodriguez   : 1970   Age: 46 y.o. SEX: male    ROOM: [facility] MRN: [id number]           CSN: [id number]      Ordering Provider: ministerio  EEG Number: [id number] Time of Test:  0694    Hand: Right   Sedation: no    H.V. Done: No NOT DONE Photic: No    Sleep: Yes  Drowsy: Yes   Sleep Deprived: No    Seizures observed: no    Mentality: lethargic      Clinical History:REPEAT EEG PATIENT HAS HX OF COMPLICATED SEIZURES. EEG YESTERDAY SHOWED RT HEMISPHERE SLOWING WITH INTERMITTENT SHARPS, ALSO THETA DELTA SLOWING. PATIENT LETHARGIC TODAY. Past Medical History:       Diagnosis Date    Abnormal thyroid biopsy     s/p left hemithyroidectomy 8062 - follicular adenoma    Acid reflux     Anemia     resolved - previously seen by Dr. Clare Leon (due to enlarged spleen)    Anesthesia     seizures with brain surgery due to blood sugar got really high    [facility] ([facility]     Blood clot in vein 2016    [facility]) 2019    thyroid    Chest pain     previously seeing [address] [place] [address] cardiologist, now seeing Dr. Katrin Reis (LAD bridging on cath 2016)    Chronic back pain     Chronic bronchitis ([facility]     Dr. Shanelle Zhu Colon polyp 2016    Depression     seeing Dahlia Ziegler DVT (deep venous thrombosis) ([facility] [id number]    not on Coumadin due to unable to regulate - Dr. Leatha Yancey - no etiology found per patient    Esophageal abnormality     nodule     Fatty liver disease, nonalcoholic     [facility]    Furuncle     legs    History of Doppler ultrasound 2011    No hemodynamically significant carotid stenosis is identified. A thyroid nodule on each side. Dedicated ultrasound of thyroid gland is suggested to further evaluate if clinically indicated.      History of pulmonary embolism     s/p GFF, related to knee surgery    Hx of blood clots     leg and lung    Hyperlipidemia     severely elevated triglycerides    Hypertension     diastolic    Intracranial arachnoid cyst 11/2012    Dr. Elisa England drained, complicated with seizures, DKA    Irritable bowel syndrome     Liver disease     Claudy Fontaine - elevated LFT - positive smooth muscle antibody, steatosis per liver bx 3/2014 with Dr. Lechuga Vita (multiple drug resistant organisms) resistance 2012    MRSA (methicillin resistant staph aureus) culture positive     h/o in foot and before brain surgery    Nephrolithiasis     noted on CT abdomen 9/2016, 6/2019    Neuropathy     Pancreatic insufficiency     Positive VITA (antinuclear antibody)     Dr. Julia Israel - first visit in 6/2013    Prolonged emergence from general anesthesia     Restless legs syndrome     Seizures ([address]     started with brain surgery    Sinus tachycardia     Holter 3/2013 - seeing Dr. Guerrero Economy Skull fracture [facility])     clips     Sleep apnea     positive sleep apnea per study 10/2020.     Systolic dysfunction     \"systolic bridge\"  EF 71% ECHO 9/2019    Type II or unspecified type diabetes mellitus without mention of complication, not stated as uncontrolled 2007       Scheduled Meds:   vancomycin  1,250 mg Intravenous Q8H    lactobacillus  1 capsule Oral Daily with breakfast    insulin lispro  10 Units Subcutaneous TID WC    bupivacaine (PF)  30 mL Intradermal Once    divalproex  500 mg Oral BID    DULoxetine  60 mg Oral Daily    empagliflozin  25 mg Oral Daily    gabapentin  600 mg Oral 4x Daily    insulin glargine  75 Units Subcutaneous BID    lacosamide  200 mg Oral BID    levETIRAcetam  2,000 mg Oral BID    metoprolol tartrate  75 mg Oral BID    pantoprazole  40 mg Oral BID    rOPINIRole  1 mg Oral TID    zonisamide  500 mg Oral Nightly    sodium chloride flush  5-40 mL Intravenous 2 times per day    insulin lispro  0-12 Units Subcutaneous TID WC    insulin lispro  0-6 Units Subcutaneous Nightly    vancomycin (VANCOCIN) intermittent dosing (placeholder)   Other RX Placeholder     Continuous Infusions:   sodium chloride 25 mL (07/20/21 1936)    dextrose       PRN Meds:.ondansetron, oxyCODONE-acetaminophen **OR** oxyCODONE-acetaminophen, morphine, promethazine, sodium chloride flush, sodium chloride, acetaminophen **OR** acetaminophen, glucose, dextrose, glucagon (rDNA), dextrose    Technician: Vijay Glover RCP 7/26/2021

## 2021-07-26 NOTE — OP NOTE
[address] [place] [address]                                OPERATIVE REPORT    PATIENT NAME: Jeff Haney                   :        1970  MED REC NO:   [id number]                           ROOM:       0019  ACCOUNT NO:   [de-identified]                           ADMIT DATE: 2021  PROVIDER:     Keyona Maldonado. Domenica Whitaker D.P.M. DATE OF PROCEDURE:  2021    SURGEON:  Adarsh Whitaker DPM    ASSISTANTS:  1. Ania Villareal MD  2. Mikayla Morton DPM    PREOPERATIVE DIAGNOSES:  1. Ulcer, left heel with fat layer breakdown. 2.  Cellulitis abscess, left foot. POSTOPERATIVE DIAGNOSES:  1. Ulcer, left heel with fat layer breakdown. 2.  Cellulitis abscess, left foot. PROCEDURE:  Incision and drainage of abscess, left foot, code 41885. ANESTHESIA:  Local only. HEMOSTASIS:  None. EBL:  Less than 10 mL. MATERIALS:  None. INJECTABLES:  20 mL of 0.5% Marcaine with epinephrine. OPERATIVE PROCEDURE:  After properly being identified in the patient's  room at bedside, we confirmed the correct patient, operating on the  correct left lower extremity. Attention was directed to the ulcer of  the left heel. He had plantar ulcer of the left heel with some  cellulitis of the medial aspect of the left calcaneal area. A 4 cm  incision was made extending vertically and medially towards this area of  the medial heel area. Incision was carried through the subcutaneous  tissue superficially. Blunt dissection was performed to the deeper  subcutaneous tissue down to level of the plantar fascia and abductor  muscle. The proximal tissue did not show any infarcted tissue or pocket  of abscess. The area was also then probed with a hemostat from the  plantar ulcer up proximally and medially overlying the incision area  with no drainage or abscess present. Incision was then flushed with  fair amounts of saline solution.   The incision was then closed with 3-0  Monocryl suture for the skin edges Mesalt packing to the plantar wound  and dry dressing applied. The patient tolerated the anesthesia and  procedure well.         Lizbeth Yao D.P.M.    D: 07/25/2021 11:42:50       T: 07/25/2021 11:46:08     [email or web address]  Job#: [id number]     Doc#: [id number]    CC:

## 2021-07-26 NOTE — PLAN OF CARE
Problem: Pain:  Goal: Pain level will decrease  Description: Pain level will decrease  Outcome: Ongoing  Note: Pt report pain at 8 on scale. Pt states oral medication helping to achieve pain goal of a 6 on scale. Goal: Control of acute pain  Description: Control of acute pain  Outcome: Ongoing  Goal: Control of chronic pain  Description: Control of chronic pain  Outcome: Ongoing     Problem: Falls - Risk of:  Goal: Will remain free from falls  Description: Will remain free from falls  Outcome: Ongoing  Note: Pt is compliant with use of non-skid slippers. Pt is disoriented and compulsive. Bed is lowered, bed alarm is in place, and rounding is increased. Goal: Absence of physical injury  Description: Absence of physical injury  Outcome: Ongoing  Note: Pt is free from physical injury at this time. Problem: Discharge Planning:  Goal: Discharged to appropriate level of care  Description: Discharged to appropriate level of care  Outcome: Ongoing  Note: Family prefers transfer to [facility] at discharge. Care manager and social work helping with discharge needs. Problem: Serum Glucose Level - Abnormal:  Goal: Ability to maintain appropriate glucose levels will improve  Description: Ability to maintain appropriate glucose levels will improve  Outcome: Ongoing  Note: Pt glucose levels have remained WDL for this shift. No insulin coverage has been required. Problem: Sensory Perception - Impaired:  Goal: Ability to maintain a stable neurologic state will improve  Description: Ability to maintain a stable neurologic state will improve  Outcome: Ongoing     Problem: Skin Integrity:  Goal: Will show no infection signs and symptoms  Description: Will show no infection signs and symptoms  Outcome: Ongoing  Note: Dressing dry and intact. Unable to visualize surgical incision due to surgical dressing. Dressing not to be removed today. No fevers, tachycardia, hypotension noted.  Pt denies any complaints Goal: Absence of new skin breakdown  Description: Absence of new skin breakdown  Outcome: Ongoing  Note: Pt's left heel surgical incision healing. Dressing is dry and intact and not due to be changed today. Wounds on right shin dressed for protection. Pt understands the importance of frequent repositioning in order to prevent any skin breakdown. Problem: DISCHARGE BARRIERS  Goal: Patient's continuum of care needs are met  Outcome: Ongoing     Problem: Nutrition  Goal: Optimal nutrition therapy  Outcome: Ongoing  Note: Pt had a snack. Nausea/ dry heaving controlled at this time. Care plan reviewed with patient and wife. Patient and wife verbalize understanding of the plan of care and contribute to goal setting.

## 2021-07-26 NOTE — PROGRESS NOTES
Progress note: Infectious diseases    Patient - Hao Talavera,  Age - 46 y.o.    - 1970      Room Number - 0I-53/016-D   MRN -  [id number]   [facility]  Date of Admission -  2021  3:31 PM    SUBJECTIVE:   His wound was noted to be soaked with urine. OBJECTIVE   VITALS    height is 6' 2\" (1.88 m) and weight is 259 lb 1.6 oz (117.5 kg). His oral temperature is 97.5 °F (36.4 °C). His blood pressure is 136/75 and his pulse is 60. His respiration is 18 and oxygen saturation is 96%.        Wt Readings from Last 3 Encounters:   21 259 lb 1.6 oz (117.5 kg)   21 254 lb (115.2 kg)   21 249 lb 3.2 oz (113 kg)       I/O (24 Hours)    Intake/Output Summary (Last 24 hours) at 2021 8767  Last data filed at 2021 2098  Gross per 24 hour   Intake 1200 ml   Output 600 ml   Net 600 ml       General Appearance  Awake, alert, oriented,  not  In acute distress  HEENT - normocephalic, atraumatic, pink conjunctiva,  anicteric sclera  Neck - Supple, no mass  Lungs -  Bilateral good air entry, no rhonchi, no wheeze  Cardiovascular - Heart sounds are normal.     Abdomen - soft, not distended, nontender,   Neurologic -oriented to person place and time  Skin -scabbed wound on his right lower shin  , less red, no drainage  Sutured left heel wound, there is redness around the heel, no prulent drainage noted    MEDICATIONS:      vancomycin  1,250 mg Intravenous Q8H    lactobacillus  1 capsule Oral Daily with breakfast    insulin lispro  10 Units Subcutaneous TID WC    bupivacaine (PF)  30 mL Intradermal Once    divalproex  500 mg Oral BID    DULoxetine  60 mg Oral Daily    empagliflozin  25 mg Oral Daily    gabapentin  600 mg Oral 4x Daily    insulin glargine  75 Units Subcutaneous BID    lacosamide  200 mg Oral BID    levETIRAcetam  2,000 mg Oral BID    metoprolol tartrate  75 mg Oral BID    pantoprazole  40 mg Oral BID    rOPINIRole  1 mg Oral TID    zonisamide  500 mg Oral Nightly    sodium chloride flush  5-40 mL Intravenous 2 times per day    insulin lispro  0-12 Units Subcutaneous TID WC    insulin lispro  0-6 Units Subcutaneous Nightly    vancomycin (VANCOCIN) intermittent dosing (placeholder)   Other RX Placeholder      sodium chloride 25 mL (07/20/21 1936)    dextrose       ondansetron, oxyCODONE-acetaminophen **OR** oxyCODONE-acetaminophen, morphine, promethazine, sodium chloride flush, sodium chloride, acetaminophen **OR** acetaminophen, glucose, dextrose, glucagon (rDNA), dextrose      LABS:     CBC:   Recent Labs     07/25/21  0848 07/25/21  0943 07/26/21  0533   WBC see below 6.1 5.9   HGB see below 13.8* 13.8*   PLT see below 222 236     BMP:    Recent Labs     07/24/21  0445 07/25/21  0943 07/26/21  0533    138 139   K 3.8 4.1 3.8    105 108   CO2 26 25 23   BUN 6* 6* 6*   CREATININE 0.8 0.7 0.7   GLUCOSE 66* 146* 131*     Calcium:  Recent Labs     07/26/21  0533   CALCIUM 8.8      Recent Labs     07/25/21  1546 07/25/21  2018 07/26/21  0610   POCGLU 146* 131* 138*        CULTURES:   UA: No results for input(s): SPECGRAV, PHUR, COLORU, CLARITYU, MUCUS, PROTEINU, BLOODU, RBCUA, WBCUA, BACTERIA, NITRU, GLUCOSEU, BILIRUBINUR, UROBILINOGEN, KETUA, LABCAST, LABCASTTY, AMORPHOS in the last 72 hours.     Invalid input(s): CRYSTALS  Micro:   Lab Results   Component Value Date    BC No growth-preliminary No growth  07/20/2021        Problem list of patient:     Patient Active Problem List   Diagnosis Code    Depression F32.9    Liver disease K76.9    Restless legs syndrome G25.81    Chronic back pain M54.9, G89.29    Other chest pain R07.89    Irritable bowel syndrome K58.9    Non compliance w medication regimen Z91.14    Hypertension associated with diabetes (HCC) E11.59, I15.2    Hyperlipidemia with target LDL less than 100 E78.5    IBS (irritable bowel syndrome) K58.9    Sinus tachycardia R00.0    Confusion R41.0    Encephalopathy G93.40    Diabetes mellitus type II, uncontrolled ([facility]) E11.65    Dizziness R42    Neuropathy G62.9    Hyperammonemia ([facility]) E72.20    Medication overdose, insulin T50.901A    Suicide attempt ([facility] T14.91XA    Hypoglycemia E16.2    Hypokalemia E87.6    Lithium toxicity T56.891A    Nausea & vomiting R11.2    Snoring R06.83    Hypogonadism [id number]    Erectile dysfunction due to diseases classified elsewhere N52.1    Rash R21    Headaches due to old head injury G44.309, S09.90XS    Abdominal wall pain in left lower quadrant R10.32    Bipolar affective disorder ([facility]) F31.9    Hypogonadism in male E29.1    Type 2 diabetes mellitus with diabetic neuropathy ([facility]) E11.40    Hypomagnesemia E83.42    Partial seizure ([facility] R56.9    Syncope and collapse R55    Headache disorder R51.9    Partial symptomatic epilepsy with complex partial seizures, not intractable, without status epilepticus ([facility]) G40.209    Respiratory acidosis E87.2    Essential hypertension I10    Gastritis K29.70    Gastroesophageal reflux disease K21.9    Mixed hyperlipidemia E78.2    Acute lateral meniscus tear of right knee S83.281A    Recurrent right inguinal hernia K40.91    Bicipital tendinitis of right shoulder M75.21    Carpal tunnel syndrome of right wrist G56.01    Obesity (BMI 30-39. 9) E66.9    Lump of axilla R22.30    Familial hypercholesterolemia E78.01    Coronary artery disease of native artery of native heart with stable angina pectoris ([facility]) I25.118    Exertional dyspnea R06.00    Vitamin D deficiency E55.9    Seizures ([facility]) R56.9    Nausea and vomiting R11.2    Type II diabetes mellitus with manifestations, uncontrolled ([facility]) E11.8, E11.65    Chondromalacia of knee, right M94.261    Effusion of left knee M25.462    Elevated levels of transaminase & lactic acid dehydrogenase R74.01, R74.02    Other intervertebral disc

## 2021-07-26 NOTE — PROGRESS NOTES
Hospitalist Progress Note      Patient:  Augustus Rizzo    Unit/Bed:7K-19/019-A  YOB: 1970  MRN: [id number]   Acct: [de-identified]   PCP: Rachel Weaver MD  Date of Admission: 7/20/2021    Assessment/Plan:    1. Multiple Wounds: Cellulitis with Abscess, Left heel & Diabetic Ulcers, RLE: Podiatry & ID consulted. Continue IV ABX. Following cultures, blood cultures are NGTD. Pt is afebrile and no leukocytosis. Norco changed to Q6H from [facility] for better pain control. Podiatry bedside debridement on 7/23. Pt will be prescribed PO ABX at [place]. 2. IDDM, uncontrolled: HgbA1c 8.3, BS has been very liable, more stable now Dextrose drip stopped. Insulin regimen: Lantus 75 units BID + Humalog 10 units + SSI. Will continue to check glucose multiple times a day to tight control. 3. Seizure DO: Pt has been having episodes of unresponsiveness, family states that his seizures look like this. Neurology consulted; appreciate recommendations. EEG completed on 7/25, which showed abnormalities. Another EEG completed today (7/26). Continue AEDs. 4. Essential HTN: BP is stable. Continue home medications. 5. HLD: Statin. Dispo: Awaiting transfer to [facility].     Chief Complaint: Leg Wounds     Initial H and P:-    Initial H&P \"Patient was sent here after a appointment with his primary care physician. He has been having issues with nonhealing lower extremity wounds. He has been trying to keep the wounds clean with soap and water Hibiclens and an biotic ointment. However the wounds are getting worse. Has a history of MRSA as well. He also has noted that his blood sugars have been significantly elevated as of late. He states he also has not ate or drink very well recently. He is brought to the ER and seen by podiatry who performed some debridement and otherwise he is being admitted for IV antibiotics for the treatment of these wounds. \"     7/21: No acute events overnight. Pt states that his pain is not controlled at all. Podiatry consulted and awaiting surgical decision. ID consulted today. 7/22: No acute events overnight. Pt states that her pain is relatively controlled. Pt states that his BS is high due to his insulin being late. 7/23: No acute events overnight. Bedside I&D by podiatry. Podiatry is thinking discharge tomorrow after looking at debridement. Glucose is much more controlled. 7/24: Patient has been struggling with hypoglycemia early this a.m. and throughout the morning. Patient states he is not feeling well and does not want to eat breakfast.  Dextrose drip ordered. We will continue to monitor. Patient states he has not slept well in the last 3 days and thinks that might be contributing to his issues. 7/25: Pt continues to have blank stares and sleepiness. EEG completed today, which showed abnormalities. Pt states that he has no pain or any other issues at this time. Awaiting neuro input. Subjective (past 24 hours):   Pt is still confused and lethargic. Neurology consulted. Attempted to transfer to [facility], no beds. Neurology is speaking to Neuro ICU in [facility]. V's. Past medical history, family history, social history and allergies reviewed again and is unchanged since admission. ROS Unable to assess due to mentation.      Medications:  Reviewed    Infusion Medications    sodium chloride 25 mL (07/20/21 1936)    dextrose       Scheduled Medications    vancomycin  1,250 mg Intravenous Q8H    lactobacillus  1 capsule Oral Daily with breakfast    insulin lispro  10 Units Subcutaneous TID WC    bupivacaine (PF)  30 mL Intradermal Once    divalproex  500 mg Oral BID    DULoxetine  60 mg Oral Daily    empagliflozin  25 mg Oral Daily    gabapentin  600 mg Oral 4x Daily    insulin glargine  75 Units Subcutaneous BID    lacosamide  200 mg Oral BID    levETIRAcetam  2,000 mg Oral BID    metoprolol tartrate  75 mg Oral BID    HGB see below 13.8* 13.8*   HCT see below 41.5* 41.1*   PLT see below 222 236     Recent Labs     07/24/21  0445 07/25/21  0943 07/26/21  0533    138 139   K 3.8 4.1 3.8    105 108   CO2 26 25 23   BUN 6* 6* 6*   CREATININE 0.8 0.7 0.7   CALCIUM 8.6 8.7 8.8     Microbiology:    Blood culture #1:   Lab Results   Component Value Date    BC No growth-preliminary No growth  07/20/2021       Lab Results   Component Value Date    LABGRAM  07/20/2021     Few segmented neutrophils observed. Rare epithelial cells observed. Many gram positive cocci occurring singly and in pairs. Urinalysis:      Lab Results   Component Value Date    NITRU NEGATIVE 02/08/2021    WBCUA NONE 08/12/2019    WBCUA 6-10 10/17/2015    BACTERIA NONE 08/12/2019    RBCUA NONE 08/12/2019    BLOODU NEGATIVE 02/08/2021    SPECGRAV >1.030 08/12/2019    GLUCOSEU >= 1000 02/08/2021       Radiology:  MRI ANKLE LEFT W WO CONTRAST   Final Result       1. Increased signal intensity in the plantar soft tissues over the calcaneus which may represent a clinically described ulcer. No definite evidence of osteomyelitis in the underlying calcaneus. 2. Slightly increased fluid in the distal tibialis posterior tendon and along the peroneus brevis and longus tendon sheaths. 3. Increased signal intensity in the interosseous ligament between the talus and calcaneus which may be torn. 4. Abnormal signal intensity in the muscles along the medial aspect of the hindfoot. This may represent a tear of the abductor hallucis muscle belly. 5. Increased signal intensity in the flexor digitorum brevis muscle belly which may represent a partial tear. 6. Thickening at the attachment of the plantar fascia upon the calcaneus. 7. Small osteochondral defect in the medial talar dome. 8. Areas of abnormal signal intensity in the anterior process of the calcaneus, base of cuboid bone and tip of lateral malleolus suggestive of degenerative changes.    9. Increased signal intensity in the subcutaneous soft tissues over the medial and lateral aspects of the hindfoot consistent with edema and/or cellulitis. 10. Fluid collection along the talonavicular joint. **This report has been created using voice recognition software. It may contain minor errors which are inherent in voice recognition technology. **      Final report electronically signed by DR Daisha Garcia on 7/21/2021 3:25 PM      CT TIBIA FIBULA LEFT WO CONTRAST   Final Result   1. Degenerative changes of the left knee. Postsurgical changes left knee. Multiple varicosities this left calf. 2. Study otherwise unremarkable. No tibial or fibular fracture is seen. **This report has been created using voice recognition software. It may contain minor errors which are inherent in voice recognition technology. **      Final report electronically signed by Dr. Alex Espinoza on 7/20/2021 9:07 PM      XR FOOT LEFT (MIN 3 VIEWS)   Final Result   Plantar calcaneal spur. Otherwise normal left foot. **This report has been created using voice recognition software. It may contain minor errors which are inherent in voice recognition technology. **      Final report electronically signed by Dr. Alex Espinoza on 7/20/2021 3:57 PM        Electronically signed by ASHLEY Mosley on 7/26/2021 at 2:29 PM

## 2021-07-26 NOTE — CARE COORDINATION
7/26/21, 2:28 PM EDT    DISCHARGE ON GOING EVALUATION    [address] [facility] day: 6  Location: 7-19/019-A Reason for admit: Wound infection [T14. 8XXA, L08.9]   Procedure: 7/23/21 Bedside debridement per Podiatry  Barriers to Discharge: (To ED with severe pain 10/10 left heel, hx diabetic, abdominal pain, hx seizure disorder on Vimpat)   Podiatry consulted, hospitalist primary, N/V checks, diabetic mgmt, ID consulted with patient currently on IV Vancomycin, follow blood cultures, dressing care. Pain control. Pt continues to have blank stares and sleepiness. Neurology consult. EEG completed yesterday, which showed abnormalities. Repeat EEG in progress now. PCP: Colton Lam MD  Readmission Risk Score: 16%  Patient Goals/Plan/Treatment Preferences: Seizure activity: Brandee, hospitalist, is trying to get to another hospital thru one call transfer  Ext [phone]. Beds full everywhere she has tried.  First preference with [employer] - would not even put on a weighting list.  Neurology is speaking to Neuro ICU in [facility]. DIEGO's.

## 2021-07-26 NOTE — PROGRESS NOTES
0900: Pts alarm sounding. Pt up from bed heading to the bathroom. Pt urinating on the floor. Assisted to toilet, bathed, and redressed. Assisted back to bed. Offered pt to sit in chair, but he declined. Encouraged pt to eat. Pt declined stating he didn't feel like eating. Education offered regarding wound healing and diabetes management and the importance nutrition plays. Pt continued to decline to eat. 1030: Wife, Gemini Ramires, in and updated on refusal to eat breakfast. She reheated the breakfast and is attempting to feed him. 1130: Wife, Gemini Ramires, reports pt ate a hashbrown from [facility] and drank a glass of orange juice. 1230: Holding lunch insulin. Pts chem was checked within an hour of drinking orange juice and eating a hashbrown. He has a poor appetite. Chem currently 164.

## 2021-07-26 NOTE — PROGRESS NOTES
NEUROLOGY INPATIENT PROGRESS NOTE    Patient- Mae Grullon    MRN -  [id number]   Acct # - [de-identified]      - 1970    46 y.o. Subjective: The patient is seen as follow up for seizures. Patient had a dose of ativan and depakote 500mg iv yesterday. Per AM rounds- patient was more alert. This afternoon the wife is at the bedside and states he is confused like he usually is during his seizures. Transfer to [address] [place] [address] delayed. Wife is ok with transfer to any facility that has cEEG monitoring. Objective:   BP (!) 141/82   Pulse 61   Temp 97.7 °F (36.5 °C) (Oral)   Resp 16   Ht 6' 2\" (1.88 m)   Wt 259 lb 1.6 oz (117.5 kg)   SpO2 98%   BMI 33.27 kg/m²       Intake/Output Summary (Last 24 hours) at 2021 1656  Last data filed at 2021 1531  Gross per 24 hour   Intake 1742 ml   Output 1200 ml   Net 542 ml       Physical Exam:  Limited as patient is lethargic  Able to tell me how old he is  Follows one step commands- show me your tongue, close and open your eyes  Moves upper extremities, refuses to move lower extremetities. ROS:    Cardiac: no chest pain. No palpitations.   Renal : no flank pain, no hematuria  Skin: no rash    Medications:     vancomycin  1,250 mg Intravenous Q8H    lactobacillus  1 capsule Oral Daily with breakfast    insulin lispro  10 Units Subcutaneous TID WC    bupivacaine (PF)  30 mL Intradermal Once    divalproex  500 mg Oral BID    DULoxetine  60 mg Oral Daily    empagliflozin  25 mg Oral Daily    gabapentin  600 mg Oral 4x Daily    insulin glargine  75 Units Subcutaneous BID    lacosamide  200 mg Oral BID    levETIRAcetam  2,000 mg Oral BID    metoprolol tartrate  75 mg Oral BID    pantoprazole  40 mg Oral BID    rOPINIRole  1 mg Oral TID    zonisamide  500 mg Oral Nightly    sodium chloride flush  5-40 mL Intravenous 2 times per day    insulin lispro  0-12 Units Subcutaneous TID WC    insulin lispro  0-6 Units significant  information provided by this examination. Normal foramen magnum and brainstem-cervical cord junction. Normal  craniovertebral junction. Normal anterior atlantoaxial articulation. Normal odontoid process. Normal cervical lordosis. Normal vertebral bodies and posterior osseous  elements. C2-3:  Normal endplates. Normal disc height, signal and morphology. Normal central canal and intervertebral neural foramina. C3-4:  Normal endplates. Normal disc height, signal and morphology. Normal central canal and intervertebral neural foramina. C4-5:  Normal endplates. Normal disc height, signal and morphology. Normal central canal and intervertebral neural foramina. C5-6:  There is disc desiccation, preservation disc height with minimal  annular bulging. The central canal measures 9 mm consistent central canal  stenosis. There is effacement of the circumferential CSF without cervical  cord distortion. There is no signal alteration of the cervical cord. There  is no myelomalacia or cervical cord edema. The bilateral intervertebral  neural foramina remain patent. C6-7:  There is disc desiccation, preservation disc height, with a  posterior right central to right lateral disc osteophyte complex (axial  T2*GRE series 16, image 20), measuring 5 x 10 mm (AP x transverse),  producing rightward ventral thecal sac flattening, narrowing the AP  diameter of the central canal to 8 mm, consistent with a moderate central  canal stenosis. There is effacement of the circumferential CSF without  cervical cord distortion. There is no signal alteration of the cervical  cord. There is no myelomalacia or cervical cord edema. There is interval  stability as compared to the above noted prior comparison study. There is  moderate right-sided osseous foraminal stenosis with potential for neural  impingement. C7-T1:  Normal endplates. Normal disc height, signal and morphology.   Normal central canal and intervertebral neural foramina. Normal cervical cord. Normal visualized soft tissue structures. ___________________________________    IMPRESSION:  1. C5-6 disc desiccation with annular bulging producing a mild central  canal stenosis with a loss of functional reserve the central canal.  2.  C6-7 posterior right central to right lateral disc osteophyte complex  producing a moderate central canal with a loss of functional reserve the  central canal.  3.  C6-7 right-sided osseous foraminal stenosis with potential for neural  impingement. Electronically Signed:  Kaylan Manzo DO  2017/04/29 at 12:13 EDT  Tel [phone], Service support  [phone], Fax [phone]          cc: Andrew Moya MD      Dictated by:  Kaylan Manzo DO on 04/28/17 1447  Technologist:  Ed CUENCA(COURT)(MR)  Transcribed by:  Kaylan Manzo on 04/29/17 1213    Report Signed by:  Gisela Britton on 04/29/17 1213    No results found for this or any previous visit. No results found for this or any previous visit. No results found for this or any previous visit. Results for orders placed during the hospital encounter of 02/25/21    MRI BRAIN WO CONTRAST    Narrative  PROCEDURE: MRI BRAIN WO CONTRAST    INDICATION:  Encephalopathy. Speech difficulties, memory loss and weakness in both legs with tremor in the right hand and arm. COMPARISON: CT head dated 1/22/2021 and MR brain dated 9/14/2013. TECHNIQUE: Multiplanar and multiple spin echo MRI images were obtained of the brain without contrast.    FINDINGS:  Redemonstration of a right frontal craniotomy with moderate-sized area of encephalomalacia in the subjacent right frontal lobe, grossly stable compared to prior CT and mildly increased compared to prior MRI in 2013 as evidence for maturation/evolution of  encephalomalacia. There is stable moderate parietal predominant volume loss.  No other significant focal areas of abnormal T2/FLAIR prolongation identified within the parenchyma. No focal areas of restricted diffusion are present. The major vascular flow voids appear patent. Orbits are unremarkable. Paranasal sinuses are clear. Mastoid air cells are clear. Impression  Redemonstration of right frontal craniotomy with subjacent area of encephalomalacia in the right frontal lobe from prior surgery without evidence of acute intracranial abnormality. **This report has been created using voice recognition software. It may contain minor errors which are inherent in voice recognition technology. **    Final report electronically signed by Dr. Melissa Mcgovern MD on 2021 3:35 PM    Results for orders placed in visit on 19    MRI Brain W WO Contrast    Narrative  Ordering Provider: Elizabeth Cui [id number]    Radiology Department  Patient:  Shakira Small [facility]. :  1970   [place]: [place] point, 89 [facility]  [place]  [place] [address]   Unit #:   [id number]  Acct #:  [de-identified]  Ordering Phys: Elizabeth Souza MD    Exam Date: 19  Accession #:  [id number]  Exam:  MRI   MRI Brain w/wo Contrast  Result:    HISTORY:  PARTIAL EPILEPSY WITH IMPAIRMENT CONSCIOUSNESS, HEAD NUMBNESS, HEADACHES,  HX OF CYST REMOVAL ON FRONTAL PART OF HEAD    EXAM/TECHNIQUE:  MR Brain WO/W Contrast: 20 cc Magnevist administered intravenously. Multiplanar, multisequence. 1.16 Brenda. COMPARISON: 10/5/15 MRI brain. FINDINGS:  # of images incl. paperwork: 138  Again demonstrated are changes of remote right frontal craniotomy with a  small amount of encephalomalacia in the underlying right superior frontal  gyrus. No mass, acute infarct, or hemorrhage. No concerning abnormal enhancement. Otherwise the contour and signal of the brain is within normal limits. Major flow voids preserved. No hydrocephalus. No acute osseous abnormality.  Small polyp posterior aspect left maxillary  sinus again demonstrated. IMPRESSION:  No acute findings. Remote right frontal craniotomy with a small amount of  underlying encephalomalacia, unchanged. ** Electronically Signed by Cory Moore MD **  ** on 04/10/2019 at 7323 **  Reported and signed by: Cory Moore MD    Electronically Signed:  Cory Moore,  2019/04/10 at 3:45 EDT  Tel [phone], Service support  [phone], Fax [phone]          cc: Caroline Jenkins MD; Barry Drake MD      Dictated by:  Bhavin Warren MD on 04/10/19 0345  Technologist:  Raj Montana RT(R)  Transcribed by:  Bhavin Warren MD on 04/10/19 0345    Report Signed by:  Alden Dugan MD on 04/10/19 0345    No results found for this or any previous visit. Results for orders placed during the hospital encounter of 01/15/21    CT HEAD WO CONTRAST    Narrative  PROCEDURE: CT HEAD WO CONTRAST    CLINICAL INFORMATION: Confusion. COMPARISON: CT head dated 6/5/2018. TECHNIQUE: Noncontrast 5 mm axial images were obtained through the brain. Sagittal and coronal reconstructions were created. All CT scans at this facility use dose modulation, iterative reconstruction, and/or weight-based dosing when appropriate to reduce radiation dose to as low as reasonably achievable. FINDINGS:  Redemonstration of a right frontal craniotomy with small to moderate-sized area of encephalomalacia in the subjacent frontal lobe, stable compared to prior exam. The ventricles, cisterns and sulci are otherwise symmetric and normal in size and  configuration. Gray-white matter differentiation otherwise appears grossly preserved. No intracranial hemorrhage, mass effect or midline shift is identified. The calvarium otherwise appears intact. Orbits are unremarkable. Visualized paranasal sinuses  are clear. Mastoid air cells are clear. Impression  No evidence of acute intracranial abnormality. **This report has been created using voice recognition software.  It may contain minor errors which are inherent in voice recognition technology. **    Final report electronically signed by Dr. Ninoska Bhatt MD on 1/22/2021 10:43 AM      EEG: IMPRESSION:  This is an abnormal EEG due to the presence of bifrontal  high-voltage sharp waves intermittently during the recording that may be  epileptogenic in nature, or indicates seizure tendency in the proper  clinical context. No clinical seizures were observed. In addition,  excessive slowing was seen in the theta, and delta range activity  suggestive of cortical dysfunction such as seen with encephalopathy. Assessment:  Active Problems:    Seizure ([address]    Wound infection    Diabetic leg ulcer ([address]  Resolved Problems:    * No resolved hospital problems. *    46 y.o. man with intractable epilepsy on 4 different AEDs followed by epileptologist in [place]. Recent EMU stay with both epileptic and non-epileptic spells noted. Recent wound infection may have decreased his seizure threshold. Wife asking for transfer to facility where he can have cEEG monitoring. On exam today, answers some questions appropriately, follows one step commands. No focal findings. Repeat EEG today with slowing and intermittent sharp waves in bifrontal area. Plan:    1. Provide 1mg of ativan today  2. Discussed case with neurologist at [facility] who is ok with being consultant if patient admitted to medicine service  3. Noted to have lower level of depakote (this was collected prior to his depakote bolus yesterday)  4. Will repeat depakote level. All other AED levels pending at this time.    5. Discussed plan with hospitalist and wife    David Duran MD, [place]  Board Certified Neurohospitalist      Electronically signed by Todd Guerrero MD on 7/26/21 at 4:56 PM EDT

## 2021-07-26 NOTE — PROGRESS NOTES
Podiatric Progress Note  Brittanie Channel  Subjective :   7/26/2021  Patient seen bedside today on behalf of Dr. Abby Miles. Patient is awake and alert, responsive to stimuli, and able to answer some simple questions i.e. age. Patient unable to give full answers to other questions, but more alert than previous day. Dressings b/l are soaked with urine; dressings were changed b/l.       7/25/2021  Patient seen bedside today on behalf of Dr. Abby Miles. Patient is awake and alert, responsive to stimuli, but appears confused. When questioned, patient responds with one word answers that are not appropriate to the question. Patient interview is limited for these reasons. Dressings are noted to be out of place, packing is exposed to left heel. 7/24/2021  Patient seen bedside today on behalf of Dr. Abby Miles. Patient appeared pleasant, was oriented to person, place and time. Patient relates that he still has pain to his left foot, but it is improving, this morning he rates it 6/10. Patient states he slept \"ok\" last night. Patient states he has a lot of pain with dressing changes. He denies N/V/F/SOB/C/CP or other constitutional symptoms at this time. Patient has no further pedal concerns at this point in time. 7/23/2021  Patient seen bedside today with Dr. Abby Miles. Plan today is for a bedside incision and drainage of left heel abscess, patient consent form is signed. Patient appeared pleasant, was oriented to person, place and time. Patient relates that he still has significant pain in his left foot. Pain rates 8/10 this morning. Patient states he has a lot of pain with dressing changes. He denies N/V/F/SOB/C/CP or other constitutional symptoms at this time. Patient has no further pedal concerns at this point in time. 7/22/2021  Patient seen bedside today on behalf of Dr. Abby Miles. Patient appeared pleasant, was oriented to person, place and time. Patient relates that he still has significant pain in his left foot.  Pain rates 9/10 this morning. Patient states he has a lot of pain with dressing changes. He denies N/V/F/SOB/C/CP or other constitutional symptoms at this time. Patient has no further pedal concerns at this point in time. 7/21/2021  Patient seen bedside today alongside of Dr. Kathy Keller. Patient appeared pleasant, was oriented to person, place and time. Patient relates that he still has significant pain in his left foot. Pain is to the inside part of his heel. It is extremely painful with manipulation, and dressing changes are very difficult for him. He states that he received norco for pain earlier this morning, but it has not done anything for him as of yet. Patient endorses chills a couple days ago, but denies any nausea, vomiting, fever, chest pain, shortness of breath. Patient has no further pedal concerns at this point in time.      Current Medications:    Current Facility-Administered Medications: ondansetron (ZOFRAN) injection 4 mg, 4 mg, Intravenous, Q6H PRN  vancomycin (VANCOCIN) 1250 mg in dextrose 5 % 250 mL IVPB, 1,250 mg, Intravenous, Q8H  oxyCODONE-acetaminophen (PERCOCET) 5-325 MG per tablet 1 tablet, 1 tablet, Oral, Q4H PRN **OR** oxyCODONE-acetaminophen (PERCOCET) 5-325 MG per tablet 2 tablet, 2 tablet, Oral, Q4H PRN  lactobacillus (CULTURELLE) capsule 1 capsule, 1 capsule, Oral, Daily with breakfast  insulin lispro (HUMALOG) injection vial 10 Units, 10 Units, Subcutaneous, TID WC  morphine (PF) injection 2 mg, 2 mg, Intravenous, Q4H PRN  bupivacaine (PF) (MARCAINE) 0.5 % injection 150 mg, 30 mL, Intradermal, Once  divalproex (DEPAKOTE) DR tablet 500 mg, 500 mg, Oral, BID  DULoxetine (CYMBALTA) extended release capsule 60 mg, 60 mg, Oral, Daily  empagliflozin (JARDIANCE) tablet TABS 25 mg, 25 mg, Oral, Daily  gabapentin (NEURONTIN) tablet 600 mg, 600 mg, Oral, 4x Daily  insulin glargine (LANTUS) injection vial 75 Units, 75 Units, Subcutaneous, BID  lacosamide (VIMPAT) tablet 200 mg, 200 mg, Oral, last 72 hours. No results for input(s): CKTOTAL, CKMB, CKMBINDEX, TROPONINI in the last 72 hours. Exam:   Dressing intact and urine soaked b/l. No apparent serosanguinous strike through noted. Vascular: Dorsalis pedis and posterior tibial pulses are palpable to right foot, DP is palpable to left. PT non-palpable left. Skin temperature is warm  to cool from proximal tibial tuberosity to distal digits. Increased temperature to the left wound. CFT brisk to exposed digits. Edema noted to left heel and ankle, non-pitting. Hair growth present to bilateral feet. Quality of skin is wnl for patient age. Dermatologic: Open lesion x2 noted to anterior aspect of right leg. The proximal lesion measures 2cm in diameter and displays a fibro-necrotic base. The more distal lesion measures 3.5 cm in diameter with a necrotic base, no drainage noted. To both lesions, negative probe to bone, negative malodor, negative erythema. On left, patient has open wound to heel. Scant sanguinous discharge present. Some karin-wound erythema present extending dorsally and medially. Wound probes to bone, does not track to medial foot. No purulence expressed. Incision present along medial heel, extending proximally from left heel wound. Well-coapted in appearance, intact sutures noted. Negative drainage, dehiscence, clinical signs of infection. Neurovascular: Light touch sensation grossly absent to bilateral feet, restored at mid calf. Musculoskeletal: Muscle strength 5/5 for all plantarflexors, dorsiflexors, inverters and everters to RLE, 4/5 for all quadrants LLE. Pain to palpation of left heel, and left ankle to the medial and posterior aspects. Cavus foot type noted bilateral. Semi-rigid contractures of digits 1-5 bilateral noted.  No amputations noted bilateral.     Left heel (7/24/21)              Right anterior leg (7/24/21)          IMAGING:    XR FOOT LEFT (MIN 3 VIEWS)    Result Date: 7/20/2021  PROCEDURE: XR FOOT LEFT (MIN 3 VIEWS) CLINICAL INFORMATION: pain COMPARISON: No prior study. TECHNIQUE: 4 views of the foot were obtained. FINDINGS: No acute fracture or dislocation is seen. There is no soft tissue swelling. There is a moderate-sized plantar calcaneal spur. Plantar calcaneal spur. Otherwise normal left foot. **This report has been created using voice recognition software. It may contain minor errors which are inherent in voice recognition technology. ** Final report electronically signed by Dr. Jody Miller on 7/20/2021 3:57 PM    CT TIBIA FIBULA LEFT WO CONTRAST    Result Date: 7/20/2021  PROCEDURE: NONCONTRAST CT LEFT TIBIA/FIBULA: CLINICAL INFORMATION: Infection/ abscess TECHNIQUE: Multiple axial 3 mm images of the left tibia/fibula were obtained without administration of intravenous contrast material. Computer generated sagittal and coronal images of the left tibia/fibula were also reconstructed. ALL CT SCANS AT THIS FACILITY use dose modulation, iterative reconstruction, and/or weight-based dosing when appropriate to reduce radiation dose to as low as reasonably achievable. FINDINGS: There are bony defects in the distal femur and proximal tibia conjunction with prior anterior crucial ligament repair. No acute fracture is seen. Appears be a small bone cyst in the lateral malleolus. There is mild degenerative spurring of the distal femur. Note that there appear to be multiple superficial varicosities in the distal left calf     1. Degenerative changes of the left knee. Postsurgical changes left knee. Multiple varicosities this left calf. 2. Study otherwise unremarkable. No tibial or fibular fracture is seen. **This report has been created using voice recognition software. It may contain minor errors which are inherent in voice recognition technology. ** Final report electronically signed by Dr. Jody Miller on 7/20/2021 9:07 PM    MRI ANKLE LEFT W WO CONTRAST    Result Date: 7/21/2021  PROCEDURE: MRI ANKLE LEFT W WO CONTRAST CLINICAL INFORMATION Wound, left heel, probes to bone, evaluate for abscess, osteomyelitis of left calcaneus COMPARISON: Plain radiographs dated 20 July 2021. CT scan of the left tib-fib dated 20 July 2021. . TECHNIQUE: MRI scan was carried out through the left ankle before and after intravenous administration of 20 mL of ProHance. Samir Winter FINDINGS: 1. Tendons: There is slightly increased fluid in the distal tibialis posterior tendon and along the peroneus brevis and longus tendon sheaths. The  Achilles, flexor digitorum longus and flexor hallucis longus tendons appear to be intact 2. Ligaments: There is slight thickening of the posterior talofibular and deltoid ligaments. The anterior talofibular and calcaneofibular ligaments appear to be intact. There is increased signal intensity in the interosseous ligament between the talus and calcaneus which may be torn. 3. Articular structures. There is no significant tibiotalar effusion. There is small osteochondral defect in the medial talar dome. There is a fluid collection along the talonavicular articulation. There is degenerative change involving the calcaneocuboid articulation. 4. Bony structures. The calcaneus appears to be intact. There is no definite evidence of osteomyelitis. There is abnormal signal intensity involving the anterior process of the calcaneus, base of the cuboid bone and tip of the lateral malleolus suggestive of degenerative changes. 5. Soft tissues. There is abnormal signal intensity in the plantar soft tissues overlying the calcaneus which may represent the clinically described ulcer. There is abnormal signal intensity in the muscles along the medial aspect of the hindfoot this may  represent a partially torn abductor hallucis muscle belly. There is increased signal intensity in the skin and subcutaneous soft tissues consistent with edema and/or cellulitis.  There is slight thickening at the attachment of the plantar fascia upon the  calcaneus. There is slightly increased signal intensity in the flexor digitorum brevis muscle belly which may represent a partial tear      1. Increased signal intensity in the plantar soft tissues over the calcaneus which may represent a clinically described ulcer. No definite evidence of osteomyelitis in the underlying calcaneus. 2. Slightly increased fluid in the distal tibialis posterior tendon and along the peroneus brevis and longus tendon sheaths. 3. Increased signal intensity in the interosseous ligament between the talus and calcaneus which may be torn. 4. Abnormal signal intensity in the muscles along the medial aspect of the hindfoot. This may represent a tear of the abductor hallucis muscle belly. 5. Increased signal intensity in the flexor digitorum brevis muscle belly which may represent a partial tear. 6. Thickening at the attachment of the plantar fascia upon the calcaneus. 7. Small osteochondral defect in the medial talar dome. 8. Areas of abnormal signal intensity in the anterior process of the calcaneus, base of cuboid bone and tip of lateral malleolus suggestive of degenerative changes. 9. Increased signal intensity in the subcutaneous soft tissues over the medial and lateral aspects of the hindfoot consistent with edema and/or cellulitis. 10. Fluid collection along the talonavicular joint. **This report has been created using voice recognition software. It may contain minor errors which are inherent in voice recognition technology. ** Final report electronically signed by DR Adrianne Juárez on 7/21/2021 3:25 PM    ASSESSMENT  Principle  1. Abscess, left foot, resolved  2. Puncture wound, left foot  3. Ulcerations, right leg, improving  4. Cellulitis, right proximal leg and left heel, resolved  5.  Seizure, complex    Chronic  Patient Active Problem List   Diagnosis    Depression    Liver disease    Restless legs syndrome    Chronic back pain    Other chest pain    Irritable bowel syndrome  Non compliance w medication regimen    Hypertension associated with diabetes ([address]    Hyperlipidemia with target LDL less than 100    IBS (irritable bowel syndrome)    Sinus tachycardia    Confusion    Encephalopathy    Diabetes mellitus type II, uncontrolled (HCC)    Dizziness    Neuropathy    Hyperammonemia (HCC)    Medication overdose, insulin    Suicide attempt ([address]    Hypoglycemia    Hypokalemia    Lithium toxicity    Nausea & vomiting    Snoring    Hypogonadism    Erectile dysfunction due to diseases classified elsewhere    Rash    Headaches due to old head injury    Abdominal wall pain in left lower quadrant    Bipolar affective disorder ([address]    Hypogonadism in male    Type 2 diabetes mellitus with diabetic neuropathy (HCC)    Hypomagnesemia    Partial seizure (HCC)    Syncope and collapse    Headache disorder    Partial symptomatic epilepsy with complex partial seizures, not intractable, without status epilepticus ([address]    Respiratory acidosis    Essential hypertension    Gastritis    Gastroesophageal reflux disease    Mixed hyperlipidemia    Acute lateral meniscus tear of right knee    Recurrent right inguinal hernia    Bicipital tendinitis of right shoulder    Carpal tunnel syndrome of right wrist    Obesity (BMI 30-39. 9)    Lump of axilla    Familial hypercholesterolemia    Coronary artery disease of native artery of native heart with stable angina pectoris (HCC)    Exertional dyspnea    Vitamin D deficiency    Seizure (HCC)    Nausea and vomiting    Type II diabetes mellitus with manifestations, uncontrolled (HCC)    Chondromalacia of knee, right    Effusion of left knee    Elevated levels of transaminase & lactic acid dehydrogenase    Other intervertebral disc degeneration, lumbar region    Palpitations    Splenomegaly    Sprain of right foot    Steatohepatitis    Osteopenia    Obstructive sleep apnea    Follicular neoplasm of thyroid    Nasal obstruction    Nasal septal deviation    Chest pain    MISTI (obstructive sleep apnea)    Acute post-operative pain    Bipolar 1 disorder, depressed (HCC)    Status post uvulopalatopharyngoplasty    Status post partial thyroidectomy    Wound infection    Diabetic leg ulcer (HCC)         PLAN:   - Patient was examined and evaluated  - WBC 5.9, afebrile  - Labs reviewed, creatinine 0.7   - Blood cultures x2, one grows staphylococcus, per report contamination suspected  - Cultures yield: heavy mixed anaerobic gram negative bacilli and gram positive cocci (MRSA)  - Continue IV Antibiotics vancomycin per ID recommendations  - Patient will be discharged on oral bactrim per ID recommendations  - Dressings b/l soaked with urine; dressings b/l changed   - Left heel wound and incision flushed with saline   - Packing to left heel changed with Mesalt ribbon, Betadine applied to incision, dressed with gauze 4x4's, kerlix, ACE  - RLE dressing removed; border foam to be applied by nursing   - Continue 2mg morphine PRN prescribed for pain with dressing changes   - Continue Percocet 1 tab for pain moderate, 2 tab pain severe  - Podiatry will change dressings daily  - Nonweightbearing to the left foot. WBAT to RLE   - Patient is ok to DC per podiatry, [facility] orders placed  - Podiatry will continue to follow    DISPO: [id number] Peg Gandhi to [facility] per podiatry, will continue packing changes to left heel until discharge.     Candi Paulino DPM  Podiatric Surgical Resident  7/26/2021   8:40 AM

## 2021-07-26 NOTE — PROCEDURES
[address] [place] [address]                          ELECTROENCEPHALOGRAM REPORT    PATIENT NAME: Yung Dahl                   :        1970  MED REC NO:   [id number]                           ROOM:       0019  ACCOUNT NO:   [de-identified]                           ADMIT DATE: 2021  PROVIDER:     Caterina Catalan MD    DATE OF EE2021    REFERRING PROVIDER:  Dr Lagunas Nest:  This is a [id number] male with complicated history  of seizure, history of craniotomy. This is a followup study. Medications listed are Keppra, Vimpat, insulin, gabapentin, duloxetine,  Depakote, vancomycin. CLINICAL INTERPRETATION:  This is a 17-channel EEG performed without  sleep deprivation. Hyperventilation was not performed. Photic  stimulation was not performed. The patient is described as lethargic. Background rhythm activity is noted to be slowed, poorly organized. Excessive slowing was seen in theta, and occasional delta range activity  suggestive of cortical dysfunction such as seen with encephalopathy. Intermittent high-voltage frontal sharp waves are noted suggestive of a  paradoxical cortical dysfunction, or indicate seizure tendency in the  proper clinical context. Lead and muscle artifacts were noted limiting  quality of data obtained. Slowing is seen over the right hemisphere  suggestive of a focal cortical dysfunction. IMPRESSION:  This is an abnormal EEG due to the presence of bifrontal  high-voltage sharp waves intermittently during the recording that may be  epileptogenic in nature, or indicates seizure tendency in the proper  clinical context. No clinical seizures were observed. In addition,  excessive slowing was seen in the theta, and delta range activity  suggestive of cortical dysfunction such as seen with encephalopathy.         Ania Denson MD    D: 2021 16:14:10

## 2021-07-27 ENCOUNTER — HOSPITAL ENCOUNTER (INPATIENT)
Age: 51
LOS: 9 days | Discharge: NURSING, REHAB OR LONG-TERM CARE | DRG: 056 | End: 2021-08-05
Attending: INTERNAL MEDICINE | Admitting: INTERNAL MEDICINE
Payer: MEDICARE

## 2021-07-27 VITALS
HEIGHT: 74 IN | HEART RATE: 67 BPM | DIASTOLIC BLOOD PRESSURE: 88 MMHG | TEMPERATURE: 97.6 F | RESPIRATION RATE: 16 BRPM | OXYGEN SATURATION: 98 % | WEIGHT: 251.7 LBS | BODY MASS INDEX: 32.3 KG/M2 | SYSTOLIC BLOOD PRESSURE: 163 MMHG

## 2021-07-27 LAB
ABSOLUTE EOS #: 0.12 K/UL (ref 0–0.44)
ABSOLUTE IMMATURE GRANULOCYTE: 0.06 K/UL (ref 0–0.3)
ABSOLUTE LYMPH #: 1.25 K/UL (ref 1.1–3.7)
ABSOLUTE MONO #: 0.42 K/UL (ref 0.1–1.2)
AMMONIA: 109 UMOL/L (ref 16–60)
ANION GAP SERPL CALCULATED.3IONS-SCNC: 10 MEQ/L (ref 8–16)
ANION GAP SERPL CALCULATED.3IONS-SCNC: 12 MMOL/L (ref 9–17)
BASOPHILS # BLD: 0 % (ref 0–2)
BASOPHILS ABSOLUTE: <0.03 K/UL (ref 0–0.2)
BUN BLDV-MCNC: 7 MG/DL (ref 7–22)
BUN BLDV-MCNC: 8 MG/DL (ref 6–20)
BUN/CREAT BLD: ABNORMAL (ref 9–20)
CALCIUM SERPL-MCNC: 8.4 MG/DL (ref 8.6–10.4)
CALCIUM SERPL-MCNC: 8.5 MG/DL (ref 8.5–10.5)
CHLORIDE BLD-SCNC: 106 MMOL/L (ref 98–107)
CHLORIDE BLD-SCNC: 109 MEQ/L (ref 98–111)
CO2: 20 MMOL/L (ref 20–31)
CO2: 21 MEQ/L (ref 23–33)
CREAT SERPL-MCNC: 0.57 MG/DL (ref 0.7–1.2)
CREAT SERPL-MCNC: 0.7 MG/DL (ref 0.4–1.2)
DIFFERENTIAL TYPE: ABNORMAL
EOSINOPHILS RELATIVE PERCENT: 2 % (ref 1–4)
ERYTHROCYTE [DISTWIDTH] IN BLOOD BY AUTOMATED COUNT: 12.3 % (ref 11.5–14.5)
ERYTHROCYTE [DISTWIDTH] IN BLOOD BY AUTOMATED COUNT: 40 FL (ref 35–45)
GFR AFRICAN AMERICAN: >60 ML/MIN
GFR NON-AFRICAN AMERICAN: >60 ML/MIN
GFR SERPL CREATININE-BSD FRML MDRD: > 90 ML/MIN/1.73M2
GFR SERPL CREATININE-BSD FRML MDRD: ABNORMAL ML/MIN/{1.73_M2}
GFR SERPL CREATININE-BSD FRML MDRD: ABNORMAL ML/MIN/{1.73_M2}
GLUCOSE BLD-MCNC: 157 MG/DL (ref 70–108)
GLUCOSE BLD-MCNC: 181 MG/DL (ref 70–108)
GLUCOSE BLD-MCNC: 185 MG/DL (ref 70–108)
GLUCOSE BLD-MCNC: 189 MG/DL (ref 70–99)
GLUCOSE BLD-MCNC: 189 MG/DL (ref 75–110)
GLUCOSE BLD-MCNC: 222 MG/DL (ref 75–110)
HCT VFR BLD CALC: 38.5 % (ref 40.7–50.3)
HCT VFR BLD CALC: 40.4 % (ref 42–52)
HEMOGLOBIN: 13.4 G/DL (ref 13–17)
HEMOGLOBIN: 13.8 GM/DL (ref 14–18)
IMMATURE GRANULOCYTES: 1 %
KEPPRA: 34 UG/ML
KEPPRA: 38 UG/ML (ref 12–46)
LYMPHOCYTES # BLD: 25 % (ref 24–43)
MCH RBC QN AUTO: 29.9 PG (ref 25.2–33.5)
MCH RBC QN AUTO: 30.5 PG (ref 26–33)
MCHC RBC AUTO-ENTMCNC: 34.2 GM/DL (ref 32.2–35.5)
MCHC RBC AUTO-ENTMCNC: 34.8 G/DL (ref 28.4–34.8)
MCV RBC AUTO: 85.9 FL (ref 82.6–102.9)
MCV RBC AUTO: 89.2 FL (ref 80–94)
MONOCYTES # BLD: 8 % (ref 3–12)
NRBC AUTOMATED: 0 PER 100 WBC
PDW BLD-RTO: 12 % (ref 11.8–14.4)
PLATELET # BLD: 237 THOU/MM3 (ref 130–400)
PLATELET # BLD: 277 K/UL (ref 138–453)
PLATELET ESTIMATE: ABNORMAL
PMV BLD AUTO: 9.7 FL (ref 8.1–13.5)
PMV BLD AUTO: 9.7 FL (ref 9.4–12.4)
POTASSIUM REFLEX MAGNESIUM: 3.8 MEQ/L (ref 3.5–5.2)
POTASSIUM SERPL-SCNC: 3.8 MMOL/L (ref 3.7–5.3)
RBC # BLD: 4.48 M/UL (ref 4.21–5.77)
RBC # BLD: 4.53 MILL/MM3 (ref 4.7–6.1)
RBC # BLD: ABNORMAL 10*6/UL
SEG NEUTROPHILS: 64 % (ref 36–65)
SEGMENTED NEUTROPHILS ABSOLUTE COUNT: 3.16 K/UL (ref 1.5–8.1)
SODIUM BLD-SCNC: 138 MMOL/L (ref 135–144)
SODIUM BLD-SCNC: 140 MEQ/L (ref 135–145)
VALPROIC ACID % FREE: 8.9 % (ref 5–18.4)
VALPROIC ACID LEVEL: 44 UG/ML (ref 50–125)
VALPROIC ACID, FREE: 3.9 UG/ML (ref 7–23)
VALPROIC DATE LAST DOSE: ABNORMAL
VALPROIC DOSE AMOUNT: ABNORMAL
VALPROIC TIME LAST DOSE: ABNORMAL
WBC # BLD: 5 K/UL (ref 3.5–11.3)
WBC # BLD: 5.7 THOU/MM3 (ref 4.8–10.8)
WBC # BLD: ABNORMAL 10*3/UL

## 2021-07-27 PROCEDURE — 2580000003 HC RX 258

## 2021-07-27 PROCEDURE — 80235 DRUG ASSAY LACOSAMIDE: CPT

## 2021-07-27 PROCEDURE — 2580000003 HC RX 258: Performed by: CLINICAL NURSE SPECIALIST

## 2021-07-27 PROCEDURE — 82140 ASSAY OF AMMONIA: CPT

## 2021-07-27 PROCEDURE — 99222 1ST HOSP IP/OBS MODERATE 55: CPT | Performed by: NURSE PRACTITIONER

## 2021-07-27 PROCEDURE — 2580000003 HC RX 258: Performed by: INTERNAL MEDICINE

## 2021-07-27 PROCEDURE — 6370000000 HC RX 637 (ALT 250 FOR IP): Performed by: PHYSICIAN ASSISTANT

## 2021-07-27 PROCEDURE — 80177 DRUG SCRN QUAN LEVETIRACETAM: CPT

## 2021-07-27 PROCEDURE — 6360000002 HC RX W HCPCS: Performed by: STUDENT IN AN ORGANIZED HEALTH CARE EDUCATION/TRAINING PROGRAM

## 2021-07-27 PROCEDURE — 6360000002 HC RX W HCPCS: Performed by: NURSE PRACTITIONER

## 2021-07-27 PROCEDURE — 80048 BASIC METABOLIC PNL TOTAL CA: CPT

## 2021-07-27 PROCEDURE — 6370000000 HC RX 637 (ALT 250 FOR IP): Performed by: INTERNAL MEDICINE

## 2021-07-27 PROCEDURE — 2060000000 HC ICU INTERMEDIATE R&B

## 2021-07-27 PROCEDURE — 82947 ASSAY GLUCOSE BLOOD QUANT: CPT

## 2021-07-27 PROCEDURE — 6370000000 HC RX 637 (ALT 250 FOR IP): Performed by: CLINICAL NURSE SPECIALIST

## 2021-07-27 PROCEDURE — 6360000002 HC RX W HCPCS

## 2021-07-27 PROCEDURE — 36415 COLL VENOUS BLD VENIPUNCTURE: CPT

## 2021-07-27 PROCEDURE — 80165 DIPROPYLACETIC ACID FREE: CPT

## 2021-07-27 PROCEDURE — 82948 REAGENT STRIP/BLOOD GLUCOSE: CPT

## 2021-07-27 PROCEDURE — 6360000002 HC RX W HCPCS: Performed by: INTERNAL MEDICINE

## 2021-07-27 PROCEDURE — 85025 COMPLETE CBC W/AUTO DIFF WBC: CPT

## 2021-07-27 PROCEDURE — 80203 DRUG SCREEN QUANT ZONISAMIDE: CPT

## 2021-07-27 PROCEDURE — 85027 COMPLETE CBC AUTOMATED: CPT

## 2021-07-27 PROCEDURE — 80164 ASSAY DIPROPYLACETIC ACD TOT: CPT

## 2021-07-27 PROCEDURE — 99239 HOSP IP/OBS DSCHRG MGMT >30: CPT | Performed by: PHYSICIAN ASSISTANT

## 2021-07-27 RX ORDER — DULOXETIN HYDROCHLORIDE 30 MG/1
60 CAPSULE, DELAYED RELEASE ORAL DAILY
Status: DISCONTINUED | OUTPATIENT
Start: 2021-07-27 | End: 2021-08-05 | Stop reason: HOSPADM

## 2021-07-27 RX ORDER — SODIUM CHLORIDE 0.9 % (FLUSH) 0.9 %
10 SYRINGE (ML) INJECTION PRN
Status: DISCONTINUED | OUTPATIENT
Start: 2021-07-27 | End: 2021-08-05 | Stop reason: HOSPADM

## 2021-07-27 RX ORDER — ACETAMINOPHEN 650 MG/1
650 SUPPOSITORY RECTAL EVERY 6 HOURS PRN
Status: DISCONTINUED | OUTPATIENT
Start: 2021-07-27 | End: 2021-08-05 | Stop reason: HOSPADM

## 2021-07-27 RX ORDER — LACTOBACILLUS RHAMNOSUS GG 10B CELL
1 CAPSULE ORAL DAILY
Status: DISCONTINUED | OUTPATIENT
Start: 2021-07-27 | End: 2021-08-05 | Stop reason: HOSPADM

## 2021-07-27 RX ORDER — POTASSIUM CHLORIDE 20 MEQ/1
40 TABLET, EXTENDED RELEASE ORAL PRN
Status: DISCONTINUED | OUTPATIENT
Start: 2021-07-27 | End: 2021-08-05 | Stop reason: HOSPADM

## 2021-07-27 RX ORDER — SODIUM CHLORIDE 0.9 % (FLUSH) 0.9 %
5-40 SYRINGE (ML) INJECTION EVERY 12 HOURS SCHEDULED
Status: DISCONTINUED | OUTPATIENT
Start: 2021-07-27 | End: 2021-08-05 | Stop reason: HOSPADM

## 2021-07-27 RX ORDER — ROPINIROLE 1 MG/1
1 TABLET, FILM COATED ORAL 3 TIMES DAILY
Status: DISCONTINUED | OUTPATIENT
Start: 2021-07-27 | End: 2021-08-05 | Stop reason: HOSPADM

## 2021-07-27 RX ORDER — POLYETHYLENE GLYCOL 3350 17 G/17G
17 POWDER, FOR SOLUTION ORAL DAILY PRN
Status: DISCONTINUED | OUTPATIENT
Start: 2021-07-27 | End: 2021-08-05 | Stop reason: HOSPADM

## 2021-07-27 RX ORDER — MAGNESIUM SULFATE 1 G/100ML
1000 INJECTION INTRAVENOUS PRN
Status: DISCONTINUED | OUTPATIENT
Start: 2021-07-27 | End: 2021-08-05 | Stop reason: HOSPADM

## 2021-07-27 RX ORDER — ACETAMINOPHEN 325 MG/1
650 TABLET ORAL EVERY 6 HOURS PRN
Status: DISCONTINUED | OUTPATIENT
Start: 2021-07-27 | End: 2021-08-05 | Stop reason: HOSPADM

## 2021-07-27 RX ORDER — LACOSAMIDE 100 MG/1
200 TABLET ORAL 2 TIMES DAILY
Status: DISCONTINUED | OUTPATIENT
Start: 2021-07-27 | End: 2021-08-05 | Stop reason: HOSPADM

## 2021-07-27 RX ORDER — DIVALPROEX SODIUM 500 MG/1
500 TABLET, DELAYED RELEASE ORAL 2 TIMES DAILY
Status: DISCONTINUED | OUTPATIENT
Start: 2021-07-27 | End: 2021-07-28

## 2021-07-27 RX ORDER — ONDANSETRON 2 MG/ML
4 INJECTION INTRAMUSCULAR; INTRAVENOUS EVERY 6 HOURS PRN
Status: DISCONTINUED | OUTPATIENT
Start: 2021-07-27 | End: 2021-08-05 | Stop reason: HOSPADM

## 2021-07-27 RX ORDER — DEXTROSE MONOHYDRATE 50 MG/ML
100 INJECTION, SOLUTION INTRAVENOUS PRN
Status: DISCONTINUED | OUTPATIENT
Start: 2021-07-27 | End: 2021-08-05 | Stop reason: HOSPADM

## 2021-07-27 RX ORDER — NICOTINE POLACRILEX 4 MG
15 LOZENGE BUCCAL PRN
Status: DISCONTINUED | OUTPATIENT
Start: 2021-07-27 | End: 2021-08-05 | Stop reason: HOSPADM

## 2021-07-27 RX ORDER — ZONISAMIDE 100 MG/1
500 CAPSULE ORAL NIGHTLY
Status: DISCONTINUED | OUTPATIENT
Start: 2021-07-27 | End: 2021-08-05 | Stop reason: HOSPADM

## 2021-07-27 RX ORDER — SODIUM CHLORIDE 9 MG/ML
25 INJECTION, SOLUTION INTRAVENOUS PRN
Status: DISCONTINUED | OUTPATIENT
Start: 2021-07-27 | End: 2021-08-05 | Stop reason: HOSPADM

## 2021-07-27 RX ORDER — PANTOPRAZOLE SODIUM 40 MG/1
40 TABLET, DELAYED RELEASE ORAL 2 TIMES DAILY
Status: DISCONTINUED | OUTPATIENT
Start: 2021-07-27 | End: 2021-08-05 | Stop reason: HOSPADM

## 2021-07-27 RX ORDER — ONDANSETRON 4 MG/1
4 TABLET, ORALLY DISINTEGRATING ORAL EVERY 8 HOURS PRN
Status: DISCONTINUED | OUTPATIENT
Start: 2021-07-27 | End: 2021-08-05 | Stop reason: HOSPADM

## 2021-07-27 RX ORDER — DEXTROSE MONOHYDRATE 25 G/50ML
12.5 INJECTION, SOLUTION INTRAVENOUS PRN
Status: DISCONTINUED | OUTPATIENT
Start: 2021-07-27 | End: 2021-08-05 | Stop reason: HOSPADM

## 2021-07-27 RX ORDER — LEVETIRACETAM 500 MG/1
2000 TABLET ORAL 2 TIMES DAILY
Status: DISCONTINUED | OUTPATIENT
Start: 2021-07-27 | End: 2021-08-05 | Stop reason: HOSPADM

## 2021-07-27 RX ORDER — POTASSIUM CHLORIDE 7.45 MG/ML
10 INJECTION INTRAVENOUS PRN
Status: DISCONTINUED | OUTPATIENT
Start: 2021-07-27 | End: 2021-08-05 | Stop reason: HOSPADM

## 2021-07-27 RX ORDER — INSULIN GLARGINE 100 [IU]/ML
75 INJECTION, SOLUTION SUBCUTANEOUS 2 TIMES DAILY
Status: DISCONTINUED | OUTPATIENT
Start: 2021-07-27 | End: 2021-07-28

## 2021-07-27 RX ADMIN — ROPINIROLE HYDROCHLORIDE 1 MG: 1 TABLET, FILM COATED ORAL at 21:10

## 2021-07-27 RX ADMIN — LEVETIRACETAM 2000 MG: 500 TABLET, FILM COATED ORAL at 10:10

## 2021-07-27 RX ADMIN — METOPROLOL TARTRATE 75 MG: 25 TABLET ORAL at 21:09

## 2021-07-27 RX ADMIN — ROPINIROLE HYDROCHLORIDE 1 MG: 1 TABLET, FILM COATED ORAL at 10:09

## 2021-07-27 RX ADMIN — Medication 1250 MG: at 05:35

## 2021-07-27 RX ADMIN — DULOXETINE HYDROCHLORIDE 60 MG: 30 CAPSULE, DELAYED RELEASE ORAL at 18:15

## 2021-07-27 RX ADMIN — PANTOPRAZOLE SODIUM 40 MG: 40 TABLET, DELAYED RELEASE ORAL at 21:10

## 2021-07-27 RX ADMIN — SODIUM CHLORIDE, PRESERVATIVE FREE 10 ML: 5 INJECTION INTRAVENOUS at 07:31

## 2021-07-27 RX ADMIN — DIVALPROEX SODIUM 500 MG: 500 TABLET, DELAYED RELEASE ORAL at 10:09

## 2021-07-27 RX ADMIN — LACOSAMIDE 200 MG: 50 TABLET, FILM COATED ORAL at 10:10

## 2021-07-27 RX ADMIN — Medication 1250 MG: at 21:25

## 2021-07-27 RX ADMIN — Medication 1 CAPSULE: at 10:09

## 2021-07-27 RX ADMIN — METOPROLOL TARTRATE 75 MG: 25 TABLET, FILM COATED ORAL at 10:10

## 2021-07-27 RX ADMIN — SODIUM CHLORIDE, PRESERVATIVE FREE 10 ML: 5 INJECTION INTRAVENOUS at 10:09

## 2021-07-27 RX ADMIN — DIVALPROEX SODIUM 500 MG: 500 TABLET, DELAYED RELEASE ORAL at 21:10

## 2021-07-27 RX ADMIN — ZONISAMIDE 500 MG: 100 CAPSULE ORAL at 21:10

## 2021-07-27 RX ADMIN — MORPHINE SULFATE 2 MG: 2 INJECTION, SOLUTION INTRAMUSCULAR; INTRAVENOUS at 07:31

## 2021-07-27 RX ADMIN — Medication 1 CAPSULE: at 18:15

## 2021-07-27 RX ADMIN — LEVETIRACETAM 2000 MG: 500 TABLET, FILM COATED ORAL at 21:09

## 2021-07-27 RX ADMIN — LACOSAMIDE 200 MG: 100 TABLET, FILM COATED ORAL at 21:10

## 2021-07-27 RX ADMIN — PANTOPRAZOLE SODIUM 40 MG: 40 TABLET, DELAYED RELEASE ORAL at 05:36

## 2021-07-27 RX ADMIN — DULOXETINE HYDROCHLORIDE 60 MG: 60 CAPSULE, DELAYED RELEASE ORAL at 10:10

## 2021-07-27 RX ADMIN — ROPINIROLE HYDROCHLORIDE 1 MG: 1 TABLET, FILM COATED ORAL at 18:15

## 2021-07-27 RX ADMIN — GABAPENTIN 600 MG: 600 TABLET, FILM COATED ORAL at 10:09

## 2021-07-27 RX ADMIN — ENOXAPARIN SODIUM 40 MG: 40 INJECTION SUBCUTANEOUS at 18:15

## 2021-07-27 RX ADMIN — SODIUM CHLORIDE, PRESERVATIVE FREE 10 ML: 5 INJECTION INTRAVENOUS at 21:25

## 2021-07-27 ASSESSMENT — PAIN SCALES - GENERAL
PAINLEVEL_OUTOF10: 0

## 2021-07-27 ASSESSMENT — ENCOUNTER SYMPTOMS
SINUS PRESSURE: 0
NAUSEA: 0
DIARRHEA: 0
ABDOMINAL PAIN: 0
ABDOMINAL DISTENTION: 0
RHINORRHEA: 0
VOMITING: 0
PHOTOPHOBIA: 0
SORE THROAT: 0
SINUS PAIN: 0
CONSTIPATION: 0

## 2021-07-27 NOTE — PROGRESS NOTES
Report called to Lisa Mars RN at [facility]. Patient en route with [facility] flight network. Patient going to room 402.

## 2021-07-27 NOTE — DISCHARGE SUMMARY
Hospitalist Discharge Summary        Patient: Mattie Krishnamurthy  YOB: 1970  MRN: [id number]   Acct: [de-identified]    Primary Care Physician: Chelita Dominguez MD    Admit date  7/20/2021    Discharge date:  7/27/2021 12:42 PM    Chief Complaint on presentation :-  Foot Wounds     Discharge Assessment and Plan:-   1. Intractable Epilepsy: Neurology consulted. EEG showed abnormalities. Pt is on 4 AEDs and had recent stay in EMU. [facility] was unable to take the patient and patient is being transferred to [facility]. V's.   2. Multiple Wounds: Cellulitis with Abscess, Left heel & Diabetic Ulcers, RLE: Podiatry & ID consulted. Following cultures, blood cultures are no growth. Pt is afebrile and no leukocytosis. Podiatry bedside debridement on 7/23. Pt will need prescribed PO ABX at DC - ID recommended Bactrim. Pt was on Vanco during hospital stay. 3. IDDM, uncontrolled: HgbA1c 8.3, BS has been very liable due to seizure activity and AMS. Pt has not been eating much since Saturday. Holding all insulin except for SSI. Pt was on dextrose drip for 24 hours. BS is more stable now. 4. Seizure DO: Pt has been having episodes of unresponsiveness, family states that his seizures look like this. Neurology consulted; appreciate recommendations. EEG completed on 7/25, which showed abnormalities. Another EEG completed today (7/26). Continue AEDs. 5. Essential HTN: BP is stable. Continue home medications. 6. HLD: Statin.      Initial H and P and Hospital course:-  Initial H&P \"Patient was sent here after a appointment with his primary care physician. [facility] has been having issues with nonhealing lower extremity wounds. [facility] has been trying to keep the wounds clean with soap and water Hibiclens and an biotic ointment.  However the wounds are getting worse.  Has a history of MRSA as well.  He also has noted that his blood sugars have been significantly elevated as of late. [facility] states he also has not ate or drink very well recently. Maria Fernanda Hassan is brought to the ER and seen by podiatry who performed some debridement and otherwise he is being admitted for IV antibiotics for the treatment of these wounds. \"      7/21: No acute events overnight. Pt states that his pain is not controlled at all. Podiatry consulted and awaiting surgical decision. ID consulted today.      7/22: No acute events overnight. Pt states that her pain is relatively controlled. Pt states that his BS is high due to his insulin being late.      7/23: No acute events overnight. Bedside I&D by podiatry. Podiatry is thinking discharge tomorrow after looking at debridement. Glucose is much more controlled.      7/24: Patient has been struggling with hypoglycemia early this a.m. and throughout the morning. Patient states he is not feeling well and does not want to eat breakfast.  Dextrose drip ordered. We will continue to monitor. Patient states he has not slept well in the last 3 days and thinks that might be contributing to his issues.     7/25: Pt continues to have blank stares and sleepiness. EEG completed today, which showed abnormalities. Pt states that he has no pain or any other issues at this time. Awaiting neuro input. 7/26: Pt is still confused and lethargic. Neurology consulted. Attempted to transfer to [facility], no beds. Neurology is speaking to Neuro ICU in [facility]. V's.     7/27: Transport set up and plan for [facility] today. Family is aware and agreeable. Physical Exam:-  General appearance: No apparent distress, appears stated age and cooperative. HEENT: Pupils equal, round, and reactive to light. Conjunctivae/corneas clear. Neck: Supple, with full range of motion. No jugular venous distention. Trachea midline. Respiratory:  Normal respiratory effort on RA. Clear to auscultation, bilaterally without Rales/Wheezes/Rhonchi. Cardiovascular: Regular rate and rhythm with normal S1/S2 without murmurs, rubs or gallops.   Abdomen: Soft, non-tender, non-distended with normal bowel sounds. Musculoskeletal: passive and active ROM x 4 extremities. Skin: Left heel wound. RLE multiple wounds on shin. Neurologic:  Pt was able to move all four extremities. Pt was able to follow 2 step commands.    Psychiatric: Alert, mildly confused, lethargic  Capillary Refill: Brisk,< 3 seconds   Peripheral Pulses: +2 palpable, equal bilaterally     Vitals:   Patient Vitals for the past 24 hrs:   BP Temp Temp src Pulse Resp SpO2 Weight   07/27/21 0945 (!) 163/88 97.6 °F (36.4 °C) Oral 67 16 98 % --   07/27/21 0515 -- -- -- -- -- -- 251 lb 11.2 oz (114.2 kg)   07/27/21 0508 137/74 97.5 °F (36.4 °C) Oral 61 16 94 % --   07/26/21 2348 139/68 98 °F (36.7 °C) Oral 66 16 96 % --   07/26/21 2134 134/67 97.9 °F (36.6 °C) Oral 65 18 96 % --   07/26/21 1500 (!) 141/82 97.7 °F (36.5 °C) Oral 61 16 98 % --     Weight:   Weight: 251 lb 11.2 oz (114.2 kg)   24 hour intake/output:     Intake/Output Summary (Last 24 hours) at 7/27/2021 1242  Last data filed at 7/27/2021 1039  Gross per 24 hour   Intake 1749 ml   Output 1200 ml   Net 549 ml     Discharge Medications:-      Medication List      CHANGE how you take these medications    metoprolol tartrate 50 MG tablet  Commonly known as: LOPRESSOR  Take 1.5 tablets by mouth 2 times daily  What changed: how much to take        CONTINUE taking these medications    divalproex 500 MG DR tablet  Commonly known as: DEPAKOTE     DULoxetine 60 MG extended release capsule  Commonly known as: CYMBALTA     empagliflozin 25 MG tablet  Commonly known as: Jardiance  Take 25 mg by mouth daily     gabapentin 600 MG tablet  Commonly known as: NEURONTIN     * insulin lispro 100 UNIT/ML injection vial  Commonly known as: HUMALOG     * insulin lispro (1 Unit Dial) 100 UNIT/ML Sopn  Commonly known as: HumaLOG KwikPen  Inject 10 Units into the skin 3 times daily Plus sliding scale 2     Lantus SoloStar 100 UNIT/ML injection pen  Generic drug: insulin glargine     levETIRAcetam 500 MG tablet  Commonly known as: KEPPRA     OneTouch Ultra strip  Generic drug: blood glucose test strips  1 each by In Vitro route 3 times daily DX: E11.65 Dispense Onetouch Ultra 2 test strips     pantoprazole 40 MG tablet  Commonly known as: Protonix  Take 1 tablet by mouth 2 times daily     promethazine 12.5 MG tablet  Commonly known as: PHENERGAN  Take 1-2 tablets by mouth every 8 hours as needed for Nausea     rOPINIRole 1 MG tablet  Commonly known as: REQUIP  TAKE 1 TABLET BY MOUTH THREE TIMES A DAY     sildenafil 100 MG tablet  Commonly known as: Viagra  Take 1 tablet by mouth as needed for Erectile Dysfunction     Vimpat 100 MG Tabs tablet  Generic drug: lacosamide     vitamin D 1.25 MG (81303 UT) Caps capsule  Commonly known as: ERGOCALCIFEROL  Take 1 capsule by mouth once a week     zonisamide 100 MG capsule  Commonly known as: [place]         * This list has 2 medication(s) that are the same as other medications prescribed for you. Read the directions carefully, and ask your doctor or other care provider to review them with you. Pharmacy Instructions:       · Be safe with medicines. If your doctor prescribed medicine, take it exactly as prescribed. Call your doctor if you think you are having a problem with your medicine. You will get more details on the specific medicines your doctor prescribes. Unused medications, especially pain medications, should be disposed to ensure medications do not end up being misused in the future, as well as helping to ensure drugs are not impacting the environment. [facility] and many local police agencies have medication take-back bins to properly destroy these medications. Please see the site https://apps. [employer]iversion. Run My Errandso[employer].gov/pubdispsearch/spring/main?execution=e2s1 for additional take-back bins located in your area.                  Labs :-  Recent Results (from the past 72 hour(s))   POCT Glucose    Collection Time: 07/24/21  1:56 PM   Result Value Ref Range    POC Glucose 137 (H) 70 - 108 mg/dl   POCT Glucose    Collection Time: 07/24/21  3:58 PM   Result Value Ref Range    POC Glucose 117 (H) 70 - 108 mg/dl   POCT glucose    Collection Time: 07/24/21  8:20 PM   Result Value Ref Range    POC Glucose 107 70 - 108 mg/dl   POCT glucose    Collection Time: 07/25/21  2:30 AM   Result Value Ref Range    POC Glucose 96 70 - 108 mg/dl   POCT glucose    Collection Time: 07/25/21  6:01 AM   Result Value Ref Range    POC Glucose 106 70 - 108 mg/dl   CBC    Collection Time: 07/25/21  8:48 AM   Result Value Ref Range    WBC see below 4.8 - 10.8 thou/mm3    RBC see below 4.70 - 6.10 mill/mm3    Hemoglobin see below 14.0 - 18.0 gm/dl    Hematocrit see below 42.0 - 52.0 %    MCV see below 80.0 - 94.0 fL    MCH see below 26.0 - 33.0 pg    MCHC see below 32.2 - 35.5 gm/dl    RDW-CV see below 11.5 - 14.5 %    RDW-SD see below 35.0 - 45.0 fL    Platelets see below 351 - 400 thou/mm3    MPV see below 9.4 - 12.4 fL   Scan of Blood Smear    Collection Time: 07/25/21  8:48 AM   Result Value Ref Range    SCAN OF BLOOD SMEAR see below    SPECIMEN REJECTION    Collection Time: 07/25/21  9:23 AM   Result Value Ref Range    Rejected Test BMPX     Reason for Rejection see below    Basic Metabolic Panel w/ Reflex to MG    Collection Time: 07/25/21  9:43 AM   Result Value Ref Range    Sodium 138 135 - 145 meq/L    Potassium reflex Magnesium 4.1 3.5 - 5.2 meq/L    Chloride 105 98 - 111 meq/L    CO2 25 23 - 33 meq/L    Glucose 146 (H) 70 - 108 mg/dL    BUN 6 (L) 7 - 22 mg/dL    CREATININE 0.7 0.4 - 1.2 mg/dL    Calcium 8.7 8.5 - 10.5 mg/dL   CBC    Collection Time: 07/25/21  9:43 AM   Result Value Ref Range    WBC 6.1 4.8 - 10.8 thou/mm3    RBC 4.53 (L) 4.70 - 6.10 mill/mm3    Hemoglobin 13.8 (L) 14.0 - 18.0 gm/dl    Hematocrit 41.5 (L) 42.0 - 52.0 %    MCV 91.6 80.0 - 94.0 fL    MCH 30.5 26.0 - 33.0 pg    MCHC 33.3 32.2 - 35.5 gm/dl    RDW-CV 12.5 11.5 - 14.5 %    RDW-SD 41.4 35.0 - 45.0 fL Platelets 808 065 - 102 thou/mm3    MPV 9.7 9.4 - 12.4 fL   Anion Gap    Collection Time: 07/25/21  9:43 AM   Result Value Ref Range    Anion Gap 8.0 8.0 - 16.0 meq/L   Glomerular Filtration Rate, Estimated    Collection Time: 07/25/21  9:43 AM   Result Value Ref Range    Est, Glom Filt Rate >90 ml/min/1.73m2   Valproic acid level, total    Collection Time: 07/25/21  9:43 AM   Result Value Ref Range    Valproic Acid Lvl 42.8 (L) 50.0 - 100.0 ug/mL   POCT glucose    Collection Time: 07/25/21 10:43 AM   Result Value Ref Range    POC Glucose 128 (H) 70 - 108 mg/dl   POCT Glucose    Collection Time: 07/25/21  3:46 PM   Result Value Ref Range    POC Glucose 146 (H) 70 - 108 mg/dl   POCT glucose    Collection Time: 07/25/21  8:18 PM   Result Value Ref Range    POC Glucose 131 (H) 70 - 108 mg/dl   Basic Metabolic Panel w/ Reflex to MG    Collection Time: 07/26/21  5:33 AM   Result Value Ref Range    Sodium 139 135 - 145 meq/L    Potassium reflex Magnesium 3.8 3.5 - 5.2 meq/L    Chloride 108 98 - 111 meq/L    CO2 23 23 - 33 meq/L    Glucose 131 (H) 70 - 108 mg/dL    BUN 6 (L) 7 - 22 mg/dL    CREATININE 0.7 0.4 - 1.2 mg/dL    Calcium 8.8 8.5 - 10.5 mg/dL   CBC    Collection Time: 07/26/21  5:33 AM   Result Value Ref Range    WBC 5.9 4.8 - 10.8 thou/mm3    RBC 4.56 (L) 4.70 - 6.10 mill/mm3    Hemoglobin 13.8 (L) 14.0 - 18.0 gm/dl    Hematocrit 41.1 (L) 42.0 - 52.0 %    MCV 90.1 80.0 - 94.0 fL    MCH 30.3 26.0 - 33.0 pg    MCHC 33.6 32.2 - 35.5 gm/dl    RDW-CV 12.3 11.5 - 14.5 %    RDW-SD 40.4 35.0 - 45.0 fL    Platelets 598 265 - 729 thou/mm3    MPV 9.6 9.4 - 12.4 fL   Anion Gap    Collection Time: 07/26/21  5:33 AM   Result Value Ref Range    Anion Gap 8.0 8.0 - 16.0 meq/L   Glomerular Filtration Rate, Estimated    Collection Time: 07/26/21  5:33 AM   Result Value Ref Range    Est, Glom Filt Rate >90 ml/min/1.73m2   POCT glucose    Collection Time: 07/26/21  6:10 AM   Result Value Ref Range    POC Glucose 138 (H) 70 - 108 observed. Many gram positive cocci occurring singly and in pairs. Lab Results   Component Value Date    ORG Staphylococcus (coagulase negative) 07/20/2021        Respiratory culture: No results found for: CULTRESP    Aerobic and Anaerobic :  Lab Results   Component Value Date    LABAERO  07/20/2021     light growth This is a MRSA (Methicillin Resistant Staphylococcus aureus)isolate. Isolates of MRSA (ORSA) Methicillin (Oxacillin) Resistant Staphylococcus aureus (coagulase positive) require patient be placed in CONTACT isolation. Methicillin(Oxacillin)resistant strains of staphylococci (MRSA)or(MRSE)should be considered resistant to all classes of cephalosporins, penems and beta-lactams. In the treatment of gram positive infections, GENTAMICIN should be CONSIDERED a SYNERGYSTIC agent ONLY. Ciprofloxacin and Levofloxacin, regardless of in vitro sensitivity, should not be used for staphylococcal infections other than uncomplicated lower UTIs. Lab Results   Component Value Date    LABANAE  07/20/2021     Culture yielded heavy mixed growth which included anaerobic gram negative bacilli and anaerobic gram positive cocci. If a true mixed aerobic and anaerobic infection is suspected, then broad spectrum empiric antibiotic therapy is indicated and should include coverage for anaerobic organisms. Urinalysis:      Lab Results   Component Value Date    NITRU NEGATIVE 02/08/2021    WBCUA NONE 08/12/2019    WBCUA 6-10 10/17/2015    BACTERIA NONE 08/12/2019    RBCUA NONE 08/12/2019    BLOODU NEGATIVE 02/08/2021    SPECGRAV >1.030 08/12/2019    GLUCOSEU >= 1000 02/08/2021       Radiology:-  XR FOOT LEFT (MIN 3 VIEWS)    Result Date: 7/20/2021  PROCEDURE: XR FOOT LEFT (MIN 3 VIEWS) CLINICAL INFORMATION: pain COMPARISON: No prior study. TECHNIQUE: 4 views of the foot were obtained. FINDINGS: No acute fracture or dislocation is seen. There is no soft tissue swelling. There is a moderate-sized plantar calcaneal spur. Plantar calcaneal spur. Otherwise normal left foot. **This report has been created using voice recognition software. It may contain minor errors which are inherent in voice recognition technology. ** Final report electronically signed by Dr. Justice Chaparro on 7/20/2021 3:57 PM    CT TIBIA FIBULA LEFT WO CONTRAST    Result Date: 7/20/2021  PROCEDURE: NONCONTRAST CT LEFT TIBIA/FIBULA: CLINICAL INFORMATION: Infection/ abscess TECHNIQUE: Multiple axial 3 mm images of the left tibia/fibula were obtained without administration of intravenous contrast material. Computer generated sagittal and coronal images of the left tibia/fibula were also reconstructed. ALL CT SCANS AT THIS FACILITY use dose modulation, iterative reconstruction, and/or weight-based dosing when appropriate to reduce radiation dose to as low as reasonably achievable. FINDINGS: There are bony defects in the distal femur and proximal tibia conjunction with prior anterior crucial ligament repair. No acute fracture is seen. Appears be a small bone cyst in the lateral malleolus. There is mild degenerative spurring of the distal femur. Note that there appear to be multiple superficial varicosities in the distal left calf     1. Degenerative changes of the left knee. Postsurgical changes left knee. Multiple varicosities this left calf. 2. Study otherwise unremarkable. No tibial or fibular fracture is seen. **This report has been created using voice recognition software. It may contain minor errors which are inherent in voice recognition technology. ** Final report electronically signed by Dr. Justice Chaparro on 7/20/2021 9:07 PM    MRI ANKLE LEFT W WO CONTRAST    Result Date: 7/21/2021  PROCEDURE: MRI ANKLE LEFT W WO CONTRAST CLINICAL INFORMATION Wound, left heel, probes to bone, evaluate for abscess, osteomyelitis of left calcaneus COMPARISON: Plain radiographs dated 20 July 2021. CT scan of the left tib-fib dated 20 July 2021. . TECHNIQUE: MRI scan was carried out through the left ankle before and after intravenous administration of 20 mL of ProHance. Graylon Brennan FINDINGS: 1. Tendons: There is slightly increased fluid in the distal tibialis posterior tendon and along the peroneus brevis and longus tendon sheaths. The  Achilles, flexor digitorum longus and flexor hallucis longus tendons appear to be intact 2. Ligaments: There is slight thickening of the posterior talofibular and deltoid ligaments. The anterior talofibular and calcaneofibular ligaments appear to be intact. There is increased signal intensity in the interosseous ligament between the talus and calcaneus which may be torn. 3. Articular structures. There is no significant tibiotalar effusion. There is small osteochondral defect in the medial talar dome. There is a fluid collection along the talonavicular articulation. There is degenerative change involving the calcaneocuboid articulation. 4. Bony structures. The calcaneus appears to be intact. There is no definite evidence of osteomyelitis. There is abnormal signal intensity involving the anterior process of the calcaneus, base of the cuboid bone and tip of the lateral malleolus suggestive of degenerative changes. 5. Soft tissues. There is abnormal signal intensity in the plantar soft tissues overlying the calcaneus which may represent the clinically described ulcer. There is abnormal signal intensity in the muscles along the medial aspect of the hindfoot this may  represent a partially torn abductor hallucis muscle belly. There is increased signal intensity in the skin and subcutaneous soft tissues consistent with edema and/or cellulitis. There is slight thickening at the attachment of the plantar fascia upon the  calcaneus. There is slightly increased signal intensity in the flexor digitorum brevis muscle belly which may represent a partial tear      1.  Increased signal intensity in the plantar soft tissues over the calcaneus which may represent a clinically described ulcer. No definite evidence of osteomyelitis in the underlying calcaneus. 2. Slightly increased fluid in the distal tibialis posterior tendon and along the peroneus brevis and longus tendon sheaths. 3. Increased signal intensity in the interosseous ligament between the talus and calcaneus which may be torn. 4. Abnormal signal intensity in the muscles along the medial aspect of the hindfoot. This may represent a tear of the abductor hallucis muscle belly. 5. Increased signal intensity in the flexor digitorum brevis muscle belly which may represent a partial tear. 6. Thickening at the attachment of the plantar fascia upon the calcaneus. 7. Small osteochondral defect in the medial talar dome. 8. Areas of abnormal signal intensity in the anterior process of the calcaneus, base of cuboid bone and tip of lateral malleolus suggestive of degenerative changes. 9. Increased signal intensity in the subcutaneous soft tissues over the medial and lateral aspects of the hindfoot consistent with edema and/or cellulitis. 10. Fluid collection along the talonavicular joint. **This report has been created using voice recognition software. It may contain minor errors which are inherent in voice recognition technology. ** Final report electronically signed by DR Lala Thompson on 7/21/2021 3:25 PM    Consultations during this hospital stay:-  [] NONE [] Cardiology  [] Nephrology  [] Hemo onco   [] GI   [x] ID  [] Endocrine  [] Pulm    [x] Neuro    [] Psych   [] Urology  [] ENT   [] G SURGERY   []Ortho    []CV surg    [] Palliative  [] Hospice [] Pain management   []    []TCU   [] PT/OT  OTHERS:- Podiatry    Disposition: Transfer to [facility]   Condition at Discharge: Stable    Time Spent:- 40 minutes    Electronically signed by ASHLEY Rasmussen on 7/27/21 at 12:42 PM EDT   Discharging Hospitalist

## 2021-07-27 NOTE — PROGRESS NOTES
Progress note: Infectious diseases    Patient - Jv Wade,  Age - 46 y.o.    - 1970      Room Number - 2N-04/[id number]-X   N -  [id number]   Acct # - [de-identified]  Date of Admission -  2021  3:31 PM    SUBJECTIVE:   No new issues. he is very slow due to the medications  OBJECTIVE   VITALS    height is 6' 2\" (1.88 m) and weight is 251 lb 11.2 oz (114.2 kg). His oral temperature is 97.5 °F (36.4 °C). His blood pressure is 137/74 and his pulse is 61. His respiration is 16 and oxygen saturation is 94%. Wt Readings from Last 3 Encounters:   21 251 lb 11.2 oz (114.2 kg)   21 254 lb (115.2 kg)   21 249 lb 3.2 oz (113 kg)       I/O (24 Hours)    Intake/Output Summary (Last 24 hours) at 2021 2766  Last data filed at 2021 8411  Gross per 24 hour   Intake 1719 ml   Output 1200 ml   Net 519 ml       General Appearance  Awake, alert, oriented,  not  In acute distress  HEENT - normocephalic, atraumatic, pink conjunctiva,  anicteric sclera  Neck - Supple, no mass  Lungs -  Bilateral good air entry, no rhonchi, no wheeze  Cardiovascular - Heart sounds are normal.     Abdomen - soft, not distended, nontender.   Neurologic -oriented to person place and time  Skin -scabbed wound on the right shin, dressed left heel wound    MEDICATIONS:      vancomycin  1,250 mg Intravenous Q8H    lactobacillus  1 capsule Oral Daily with breakfast    insulin lispro  10 Units Subcutaneous TID WC    bupivacaine (PF)  30 mL Intradermal Once    divalproex  500 mg Oral BID    DULoxetine  60 mg Oral Daily    empagliflozin  25 mg Oral Daily    gabapentin  600 mg Oral 4x Daily    insulin glargine  75 Units Subcutaneous BID    lacosamide  200 mg Oral BID    levETIRAcetam  2,000 mg Oral BID    metoprolol tartrate  75 mg Oral BID    pantoprazole  40 mg Oral BID    rOPINIRole  1 mg Oral TID    zonisamide  500 mg Oral Nightly    sodium chloride flush  5-40 mL Intravenous 2 times per day    insulin lispro  0-12 Units Subcutaneous TID WC    insulin lispro  0-6 Units Subcutaneous Nightly    vancomycin (VANCOCIN) intermittent dosing (placeholder)   Other RX Placeholder      sodium chloride 25 mL (07/20/21 1936)    dextrose       ondansetron, oxyCODONE-acetaminophen **OR** oxyCODONE-acetaminophen, morphine, promethazine, sodium chloride flush, sodium chloride, acetaminophen **OR** acetaminophen, glucose, dextrose, glucagon (rDNA), dextrose      LABS:     CBC:   Recent Labs     07/25/21  0943 07/26/21  0533 07/27/21  0620   WBC 6.1 5.9 5.7   HGB 13.8* 13.8* 13.8*    236 237     BMP:    Recent Labs     07/25/21  0943 07/26/21  0533 07/27/21  0620    139 140   K 4.1 3.8 3.8    108 109   CO2 25 23 21*   BUN 6* 6* 7   CREATININE 0.7 0.7 0.7   GLUCOSE 146* 131* 185*     Calcium:  Recent Labs     07/27/21  0620   CALCIUM 8.5      Recent Labs     07/26/21  1637 07/26/21  2042 07/27/21  0617   POCGLU 198* 243* 181*        CULTURES:   UA: No results for input(s): SPECGRAV, PHUR, COLORU, CLARITYU, MUCUS, PROTEINU, BLOODU, RBCUA, WBCUA, BACTERIA, NITRU, GLUCOSEU, BILIRUBINUR, UROBILINOGEN, KETUA, LABCAST, LABCASTTY, AMORPHOS in the last 72 hours.     Invalid input(s): CRYSTALS  Micro:   Lab Results   Component Value Date    BC No growth-preliminary No growth  07/20/2021        Problem list of patient:     Patient Active Problem List   Diagnosis Code    Depression F32.9    Liver disease K76.9    Restless legs syndrome G25.81    Chronic back pain M54.9, G89.29    Other chest pain R07.89    Irritable bowel syndrome K58.9    Non compliance w medication regimen Z91.14    Hypertension associated with diabetes ([facility] E11.59, I15.2    Hyperlipidemia with target LDL less than 100 E78.5    IBS (irritable bowel syndrome) K58.9    Sinus tachycardia R00.0    Confusion R41.0    Encephalopathy G93.40    Diabetes mellitus type II, uncontrolled ([facility] E11.65    Dizziness R42    Neuropathy G62.9    Hyperammonemia ([facility]) E72.20    Medication overdose, insulin T50.901A    Suicide attempt ([facility] T14.91XA    Hypoglycemia E16.2    Hypokalemia E87.6    Lithium toxicity T56.891A    Nausea & vomiting R11.2    Snoring R06.83    Hypogonadism [id number]    Erectile dysfunction due to diseases classified elsewhere N52.1    Rash R21    Headaches due to old head injury G44.309, S09.90XS    Abdominal wall pain in left lower quadrant R10.32    Bipolar affective disorder ([facility]) F31.9    Hypogonadism in male E29.1    Type 2 diabetes mellitus with diabetic neuropathy ([facility]) E11.40    Hypomagnesemia E83.42    Partial seizure ([facility] R56.9    Syncope and collapse R55    Headache disorder R51.9    Partial symptomatic epilepsy with complex partial seizures, not intractable, without status epilepticus ([facility]) G40.209    Respiratory acidosis E87.2    Essential hypertension I10    Gastritis K29.70    Gastroesophageal reflux disease K21.9    Mixed hyperlipidemia E78.2    Acute lateral meniscus tear of right knee S83.281A    Recurrent right inguinal hernia K40.91    Bicipital tendinitis of right shoulder M75.21    Carpal tunnel syndrome of right wrist G56.01    Obesity (BMI 30-39. 9) E66.9    Lump of axilla R22.30    Familial hypercholesterolemia E78.01    Coronary artery disease of native artery of native heart with stable angina pectoris ([facility]) I25.118    Exertional dyspnea R06.00    Vitamin D deficiency E55.9    Seizure ([facility]) R56.9    Nausea and vomiting R11.2    Type II diabetes mellitus with manifestations, uncontrolled ([facility]) E11.8, E11.65    Chondromalacia of knee, right M94.261    Effusion of left knee M25.462    Elevated levels of transaminase & lactic acid dehydrogenase R74.01, R74.02    Other intervertebral disc degeneration, lumbar region M51.36    Palpitations R00.2    Splenomegaly R16.1    Sprain of right foot S93.601A    Steatohepatitis K75.81    Osteopenia M85.80    Obstructive sleep apnea P38.02    Follicular neoplasm of thyroid 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 8XXA, L08.9    Diabetic leg ulcer ([address] E11.622, L97.909    Seizures ([address] R56.9         ASSESSMENT/PLAN   Soft tissue infection of the left heel and right lower leg due to MRSA: continue local wound care.   Seizure on treatment     Lily Salgado MD, MD, FACP 7/27/2021 9:21 AM

## 2021-07-27 NOTE — PROGRESS NOTES
Podiatric Progress Note  Tyron Cuevas  Subjective :   7/27/2021  Patient seen at bedside this morning on behalf of Dr. Grant Mascorro. His wife is present at bedside during the encounter. Patient appears responsive to direct verbal questions; however, he is noticeably less responsive than on 7/24/2021. He is unable to complete full sentences and only replied with one word answers or shook/nodded his head. He also took several seconds after the question was asked to answer. He denies any pain to either of his legs. He also denies any N/V/F/C/SOB/CP or bilateral calf tenderness. He has no other pedal complaints at this time. 7/26/2021  Patient seen bedside today on behalf of Dr. Grant Mascorro. Patient is awake and alert, responsive to stimuli, and able to answer some simple questions i.e. age. Patient unable to give full answers to other questions, but more alert than previous day. Dressings b/l are soaked with urine; dressings were changed b/l.       7/25/2021  Patient seen bedside today on behalf of Dr. Grant Mascorro. Patient is awake and alert, responsive to stimuli, but appears confused. When questioned, patient responds with one word answers that are not appropriate to the question. Patient interview is limited for these reasons. Dressings are noted to be out of place, packing is exposed to left heel. 7/24/2021  Patient seen bedside today on behalf of Dr. Grant Mascorro. Patient appeared pleasant, was oriented to person, place and time. Patient relates that he still has pain to his left foot, but it is improving, this morning he rates it 6/10. Patient states he slept \"ok\" last night. Patient states he has a lot of pain with dressing changes. He denies N/V/F/SOB/C/CP or other constitutional symptoms at this time. Patient has no further pedal concerns at this point in time. 7/23/2021  Patient seen bedside today with Dr. rGant Mascorro. Plan today is for a bedside incision and drainage of left heel abscess, patient consent form is signed. Patient appeared pleasant, was oriented to person, place and time. Patient relates that he still has significant pain in his left foot. Pain rates 8/10 this morning. Patient states he has a lot of pain with dressing changes. He denies N/V/F/SOB/C/CP or other constitutional symptoms at this time. Patient has no further pedal concerns at this point in time. 7/22/2021  Patient seen bedside today on behalf of Dr. Sharyle King. Patient appeared pleasant, was oriented to person, place and time. Patient relates that he still has significant pain in his left foot. Pain rates 9/10 this morning. Patient states he has a lot of pain with dressing changes. He denies N/V/F/SOB/C/CP or other constitutional symptoms at this time. Patient has no further pedal concerns at this point in time. 7/21/2021  Patient seen bedside today alongside of Dr. Sharyle King. Patient appeared pleasant, was oriented to person, place and time. Patient relates that he still has significant pain in his left foot. Pain is to the inside part of his heel. It is extremely painful with manipulation, and dressing changes are very difficult for him. He states that he received norco for pain earlier this morning, but it has not done anything for him as of yet. Patient endorses chills a couple days ago, but denies any nausea, vomiting, fever, chest pain, shortness of breath. Patient has no further pedal concerns at this point in time.      Current Medications:    Current Facility-Administered Medications: ondansetron (ZOFRAN) injection 4 mg, 4 mg, Intravenous, Q6H PRN  vancomycin (VANCOCIN) 1250 mg in dextrose 5 % 250 mL IVPB, 1,250 mg, Intravenous, Q8H  oxyCODONE-acetaminophen (PERCOCET) 5-325 MG per tablet 1 tablet, 1 tablet, Oral, Q4H PRN **OR** oxyCODONE-acetaminophen (PERCOCET) 5-325 MG per tablet 2 tablet, 2 tablet, Oral, Q4H PRN  lactobacillus (CULTURELLE) capsule 1 capsule, 1 capsule, Oral, Daily with breakfast  insulin lispro (HUMALOG) injection vial 10 Units, 10 Units, Subcutaneous, TID WC  morphine (PF) injection 2 mg, 2 mg, Intravenous, Q4H PRN  bupivacaine (PF) (MARCAINE) 0.5 % injection 150 mg, 30 mL, Intradermal, Once  divalproex (DEPAKOTE) DR tablet 500 mg, 500 mg, Oral, BID  DULoxetine (CYMBALTA) extended release capsule 60 mg, 60 mg, Oral, Daily  empagliflozin (JARDIANCE) tablet TABS 25 mg, 25 mg, Oral, Daily  gabapentin (NEURONTIN) tablet 600 mg, 600 mg, Oral, 4x Daily  insulin glargine (LANTUS) injection vial 75 Units, 75 Units, Subcutaneous, BID  lacosamide (VIMPAT) tablet 200 mg, 200 mg, Oral, BID  levETIRAcetam (KEPPRA) tablet 2,000 mg, 2,000 mg, Oral, BID  metoprolol tartrate (LOPRESSOR) tablet 75 mg, 75 mg, Oral, BID  pantoprazole (PROTONIX) tablet 40 mg, 40 mg, Oral, BID  promethazine (PHENERGAN) tablet 12.5 mg, 12.5 mg, Oral, Q8H PRN  rOPINIRole (REQUIP) tablet 1 mg, 1 mg, Oral, TID  zonisamide (ZONEGRAN) capsule 500 mg, 500 mg, Oral, Nightly  sodium chloride flush 0.9 % injection 5-40 mL, 5-40 mL, Intravenous, 2 times per day  sodium chloride flush 0.9 % injection 5-40 mL, 5-40 mL, Intravenous, PRN  0.9 % sodium chloride infusion, 25 mL, Intravenous, PRN  acetaminophen (TYLENOL) tablet 650 mg, 650 mg, Oral, Q6H PRN **OR** acetaminophen (TYLENOL) suppository 650 mg, 650 mg, Rectal, Q6H PRN  glucose (GLUTOSE) 40 % oral gel 15 g, 15 g, Oral, PRN  dextrose 50 % IV solution, 12.5 g, Intravenous, PRN  glucagon (rDNA) injection 1 mg, 1 mg, Intramuscular, PRN  dextrose 5 % solution, 100 mL/hr, Intravenous, PRN  insulin lispro (HUMALOG) injection vial 0-12 Units, 0-12 Units, Subcutaneous, TID WC  insulin lispro (HUMALOG) injection vial 0-6 Units, 0-6 Units, Subcutaneous, Nightly  vancomycin (VANCOCIN) intermittent dosing (placeholder), , Other, RX Placeholder    Objective     BP (!) 163/88   Pulse 67   Temp 97.6 °F (36.4 °C) (Oral)   Resp 16   Ht 6' 2\" (1.88 m)   Wt 251 lb 11.2 oz (114.2 kg)   SpO2 98%   BMI 32.32 kg/m²      I/O:    Intake/Output Summary (Last 24 hours) at 7/27/2021 1350  Last data filed at 7/27/2021 1039  Gross per 24 hour   Intake 1749 ml   Output 1200 ml   Net 549 ml              Wt Readings from Last 3 Encounters:   07/27/21 251 lb 11.2 oz (114.2 kg)   06/28/21 254 lb (115.2 kg)   04/13/21 249 lb 3.2 oz (113 kg)       LABS:    Recent Labs     07/26/21  0533 07/27/21  0620   WBC 5.9 5.7   HGB 13.8* 13.8*   HCT 41.1* 40.4*    237        Recent Labs     07/27/21  0620      K 3.8      CO2 21*   BUN 7   CREATININE 0.7        No results for input(s): PROT, INR, APTT in the last 72 hours. No results for input(s): CKTOTAL, CKMB, CKMBINDEX, TROPONINI in the last 72 hours. Exam:   Dressing intact and urine soaked b/l. No apparent serosanguinous strike through noted. Vascular: Dorsalis pedis and posterior tibial pulses are palpable to right foot, DP is palpable to left. PT non-palpable left. Skin temperature is warm  to warm from proximal tibial tuberosity to distal digits. CFT brisk to exposed digits. No edema noted bilaterally. Hair growth present to bilateral feet. Quality of skin is wnl for patient age. Dermatologic: Full thickness ulceration noted to plantar aspect of left heel; well-coapted incision noted to medial left heel from plantar ulcer extending proximally by approx. 5cm. Minimal serous drainage present. Wound probes to periosteum, does not track to medial foot. No purulence expressed. Open lesion x2 noted to anterior aspect of right leg. The proximal lesion measures 2cm in diameter and displays a fibro-necrotic base. The more distal lesion measures 3.5 cm in diameter with a stable necrotic base, no drainage noted. Lesions do not probe to bone; no malodor or erythema noted. Neurovascular: Light touch sensation grossly absent to bilateral feet, restored at mid calf. Musculoskeletal: Muscle strength 5/5 for all plantarflexors, dorsiflexors, inverters and everters to RLE, 4/5 for all quadrants LLE.  Pain to palpation of left heel, and left ankle to the medial and posterior aspects. Cavus foot type noted bilateral. Semi-rigid contractures of digits 1-5 bilateral noted. No amputations noted bilateral.               IMAGING:    XR FOOT LEFT (MIN 3 VIEWS)    Result Date: 7/20/2021  PROCEDURE: XR FOOT LEFT (MIN 3 VIEWS) CLINICAL INFORMATION: pain COMPARISON: No prior study. TECHNIQUE: 4 views of the foot were obtained. FINDINGS: No acute fracture or dislocation is seen. There is no soft tissue swelling. There is a moderate-sized plantar calcaneal spur. Plantar calcaneal spur. Otherwise normal left foot. **This report has been created using voice recognition software. It may contain minor errors which are inherent in voice recognition technology. ** Final report electronically signed by Dr. Lucita Canavan on 7/20/2021 3:57 PM    CT TIBIA FIBULA LEFT WO CONTRAST    Result Date: 7/20/2021  PROCEDURE: NONCONTRAST CT LEFT TIBIA/FIBULA: CLINICAL INFORMATION: Infection/ abscess TECHNIQUE: Multiple axial 3 mm images of the left tibia/fibula were obtained without administration of intravenous contrast material. Computer generated sagittal and coronal images of the left tibia/fibula were also reconstructed. ALL CT SCANS AT THIS FACILITY use dose modulation, iterative reconstruction, and/or weight-based dosing when appropriate to reduce radiation dose to as low as reasonably achievable. FINDINGS: There are bony defects in the distal femur and proximal tibia conjunction with prior anterior crucial ligament repair. No acute fracture is seen. Appears be a small bone cyst in the lateral malleolus. There is mild degenerative spurring of the distal femur. Note that there appear to be multiple superficial varicosities in the distal left calf     1. Degenerative changes of the left knee. Postsurgical changes left knee. Multiple varicosities this left calf. 2. Study otherwise unremarkable. No tibial or fibular fracture is seen.  **This report has been created using voice recognition software. It may contain minor errors which are inherent in voice recognition technology. ** Final report electronically signed by Dr. Terrance Pritchard on 7/20/2021 9:07 PM    MRI ANKLE LEFT W WO CONTRAST    Result Date: 7/21/2021  PROCEDURE: MRI ANKLE LEFT W WO CONTRAST CLINICAL INFORMATION Wound, left heel, probes to bone, evaluate for abscess, osteomyelitis of left calcaneus COMPARISON: Plain radiographs dated 20 July 2021. CT scan of the left tib-fib dated 20 July 2021. . TECHNIQUE: MRI scan was carried out through the left ankle before and after intravenous administration of 20 mL of ProHance. Michael Mckay FINDINGS: 1. Tendons: There is slightly increased fluid in the distal tibialis posterior tendon and along the peroneus brevis and longus tendon sheaths. The  Achilles, flexor digitorum longus and flexor hallucis longus tendons appear to be intact 2. Ligaments: There is slight thickening of the posterior talofibular and deltoid ligaments. The anterior talofibular and calcaneofibular ligaments appear to be intact. There is increased signal intensity in the interosseous ligament between the talus and calcaneus which may be torn. 3. Articular structures. There is no significant tibiotalar effusion. There is small osteochondral defect in the medial talar dome. There is a fluid collection along the talonavicular articulation. There is degenerative change involving the calcaneocuboid articulation. 4. Bony structures. The calcaneus appears to be intact. There is no definite evidence of osteomyelitis. There is abnormal signal intensity involving the anterior process of the calcaneus, base of the cuboid bone and tip of the lateral malleolus suggestive of degenerative changes. 5. Soft tissues. There is abnormal signal intensity in the plantar soft tissues overlying the calcaneus which may represent the clinically described ulcer.  There is abnormal signal intensity in the muscles along the medial aspect of the hindfoot this may  represent a partially torn abductor hallucis muscle belly. There is increased signal intensity in the skin and subcutaneous soft tissues consistent with edema and/or cellulitis. There is slight thickening at the attachment of the plantar fascia upon the  calcaneus. There is slightly increased signal intensity in the flexor digitorum brevis muscle belly which may represent a partial tear      1. Increased signal intensity in the plantar soft tissues over the calcaneus which may represent a clinically described ulcer. No definite evidence of osteomyelitis in the underlying calcaneus. 2. Slightly increased fluid in the distal tibialis posterior tendon and along the peroneus brevis and longus tendon sheaths. 3. Increased signal intensity in the interosseous ligament between the talus and calcaneus which may be torn. 4. Abnormal signal intensity in the muscles along the medial aspect of the hindfoot. This may represent a tear of the abductor hallucis muscle belly. 5. Increased signal intensity in the flexor digitorum brevis muscle belly which may represent a partial tear. 6. Thickening at the attachment of the plantar fascia upon the calcaneus. 7. Small osteochondral defect in the medial talar dome. 8. Areas of abnormal signal intensity in the anterior process of the calcaneus, base of cuboid bone and tip of lateral malleolus suggestive of degenerative changes. 9. Increased signal intensity in the subcutaneous soft tissues over the medial and lateral aspects of the hindfoot consistent with edema and/or cellulitis. 10. Fluid collection along the talonavicular joint. **This report has been created using voice recognition software. It may contain minor errors which are inherent in voice recognition technology. ** Final report electronically signed by DR Kelly Espinal on 7/21/2021 3:25 PM    ASSESSMENT  Principle  1. Abscess, left foot, resolved  2. Puncture wound, left foot  3. Ulcerations, right leg, improving  4. Cellulitis, right proximal leg and left heel, resolved  5. Seizure, complex    Chronic  Patient Active Problem List   Diagnosis    Depression    Liver disease    Restless legs syndrome    Chronic back pain    Other chest pain    Irritable bowel syndrome    Non compliance w medication regimen    Hypertension associated with diabetes ([address]    Hyperlipidemia with target LDL less than 100    IBS (irritable bowel syndrome)    Sinus tachycardia    Confusion    Encephalopathy    Diabetes mellitus type II, uncontrolled (HCC)    Dizziness    Neuropathy    Hyperammonemia (HCC)    Medication overdose, insulin    Suicide attempt ([address]    Hypoglycemia    Hypokalemia    Lithium toxicity    Nausea & vomiting    Snoring    Hypogonadism    Erectile dysfunction due to diseases classified elsewhere    Rash    Headaches due to old head injury    Abdominal wall pain in left lower quadrant    Bipolar affective disorder ([address]    Hypogonadism in male    Type 2 diabetes mellitus with diabetic neuropathy (HCC)    Hypomagnesemia    Partial seizure (HCC)    Syncope and collapse    Headache disorder    Partial symptomatic epilepsy with complex partial seizures, not intractable, without status epilepticus ([address]    Respiratory acidosis    Essential hypertension    Gastritis    Gastroesophageal reflux disease    Mixed hyperlipidemia    Acute lateral meniscus tear of right knee    Recurrent right inguinal hernia    Bicipital tendinitis of right shoulder    Carpal tunnel syndrome of right wrist    Obesity (BMI 30-39. 9)    Lump of axilla    Familial hypercholesterolemia    Coronary artery disease of native artery of native heart with stable angina pectoris (HCC)    Exertional dyspnea    Vitamin D deficiency    Seizure (HCC)    Nausea and vomiting    Type II diabetes mellitus with manifestations, uncontrolled (HCC)    Chondromalacia of knee, right   

## 2021-07-27 NOTE — H&P
[facility]  Office: [address], DO, [facility] Therese, DO, Keon Woodruff, DO, Margaux Alfredo, DO, Mago Herring MD, Lena Solomon MD, Lea Ortega MD, Ac Blackmon MD, Edmond Pavon MD, Fidel Page MD, Lebron Valdovinos MD, Marilou Newby, DO, Tess Ma MD, Spring Guerra DO, Siva Velarde MD,  Vidhi Doe DO, Deena Alexis MD, Jude Hanson MD, Geeta Jaramillo MD, Marcellus Rowell MD, Griselda Dupree MD, Celestine Caballero MD, Jacob Olivares, [facility], [facility], CNP, Octavio Zacarias, CNP, Deedee Epstein, CNS, Jose Fan, CNP, Aditya Ontiveros, CNP, Randall Smith, CNP, Preethi Talavera, CNP, Mary Ordaz, CNP, Tiffany Will PA-C, Courtney Hall, [facility], Yuniel Deng, CNP, Freddy Preciado, CNP, Rusty Farah, CNP, Maryam Schmitz, CNP, Rashawn Covarrubias, CNP, Juani Mills, CNP, Brenna Benz, CNP, May Ortega, [address]    HISTORY AND PHYSICAL EXAMINATION            Date:   7/27/2021  Patient name:  Sofya Headley  Date of admission:  7/27/2021  3:42 PM  MRN:   [id number]  Account:  [de-identified]  YOB: 1970  PCP:    James Wild MD  Room:   [address] [place] [address]  Code Status:    Full Code    Chief Complaint:     No chief complaint on file.  seizures    History Obtained From:     spouse, electronic medical record    History of Present Illness:     Sofya Headley is a 46 y.o. Non- / non  male who presents with No chief complaint on file. and is admitted to the hospital for the management of Seizures ([facility] [address] Mr. Magdalena Hughes is a 46year old male transferred to [facility] for neurology evaluation. Patient was admitted to [facility] one week ago for cellulitis of right lower leg. He was being treated with Vancomycin and Zosyn per wife and was doing well.  On Friday she states that she called him and said he was slow to respond to her, she attributed this to narcotic pain medication he was receiving. She said when she called to check on him on Saturday his mentation was unchanged and the staff said he was not eating and was having episodes of staring. Neurology was consulted as he has a history of complex seizures and sees a neurologist in [place]. According to wife he then had an EEG on Sunday and then again on Monday. She was told that both were abnormal and that he would need transferred for further neurology workup. She also states that he had a very similar episode in January, 2021 when he went to the hospital for thyroid surgery. She states his hospitalization lasted 10 days and he eventually \"woke up\" and was back to baseline. She states that neurology has had a very difficult time adjusting his antiepileptics. On assessment he is awake, slow to follow commands, but does so with encouragement. He BENEDICT without distress. He denies pain. Past Medical History:     Past Medical History:   Diagnosis Date    Abnormal thyroid biopsy     s/p left hemithyroidectomy 8/1837 - follicular adenoma    Acid reflux     Anemia     resolved - previously seen by Dr. Cedrick Garcia (due to enlarged spleen)    Anesthesia     seizures with brain surgery due to blood sugar got really high    Bipolar disorder ([facility] [address]     Blood clot in vein 04/07/2016    [employer]     Cancer [facility]) 04/2019    thyroid    Chest pain     previously seeing [facility] cardiologist, now seeing Dr. Ramila Griffith (LAD bridging on cath 4/2016)    Chronic back pain     Chronic bronchitis ([facility] [address]     Dr. Manuel Austin Colon polyp 08/30/2016    Depression     seeing Magaly Delarosa DVT (deep venous thrombosis) ([facility] [address] [id number]    not on Coumadin due to unable to regulate - Dr. Zofia Torrez - no etiology found per patient    Esophageal abnormality     nodule     Fatty liver disease, nonalcoholic     U/S 90/5268 [facility]    Furuncle     legs    History of Doppler ultrasound 05/29/2011    No hemodynamically significant carotid stenosis is identified.  A thyroid nodule INGUINAL HERNIA REPAIR Right 09/15/2016    Robotic assisted    KNEE ARTHROSCOPY Right 2016    KNEE SURGERY Left 2007    acl and debrided twice    OTHER SURGICAL HISTORY  5-31-11    Tilt table was associated w/ nonspecific symptoms or nausea, otherwise no significant dizziness or syncope. No significant hemodynamic changes. Otherwise, unremarkable tilt table test after 30 minutes of tilting.  OTHER SURGICAL HISTORY  01/20/2017    RIGHT SHOULDER ARTHROSCOPY, OPEN STAN, OPEN ACROMIOPLASTY, BICEP TENDESIS, RIGHT CARPAL TUNNEL RELEASE    SHOULDER SURGERY Right 01/2007    THYROID LOBECTOMY N/A 1/15/2021    THYROID LOBECTOMY, UVULOPALATOPHARYNGOPLAST LAOP performed by Severo Rolle MD at [facility] ECHOCARDIOGRAM  3-05-11    LV size and systolic function normal. EF 55-65%.  UPPER GASTROINTESTINAL ENDOSCOPY  2012    UPPER GASTROINTESTINAL ENDOSCOPY  2016    Dr. Yazmin Siegel Left 10/22/2019    EGD DILATION SAVORY performed by Uri Young MD at [facility] ENDOSCOPY Left 10/22/2019    EGD BIOPSY performed by Uri Young MD at [address]  2007        Medications Prior to Admission:     Prior to Admission medications    Medication Sig Start Date End Date Taking?  Authorizing Provider   insulin lispro (HUMALOG) 100 UNIT/ML injection vial Takes 10 units with meals plus group 2 sliding scale    Historical Provider, MD   promethazine (PHENERGAN) 12.5 MG tablet Take 1-2 tablets by mouth every 8 hours as needed for Nausea 4/13/21   Ever Warren MD   blood glucose test strips (ONETOUCH ULTRA) strip 1 each by In Vitro route 3 times daily DX: E11.65 Dispense Onetouch Ultra 2 test strips 4/13/21   Ever Warren MD   pantoprazole (PROTONIX) 40 MG tablet Take 1 tablet by mouth 2 times daily 3/8/21   Ever Warren MD   vitamin D (ERGOCALCIFEROL) 1.25 MG (19000 UT) CAPS capsule Take 1 capsule by mouth once a week 3/8/21   Chon Cason MD   insulin lispro, 1 Unit Dial, ([facility]) 100 UNIT/ML SOPN Inject 10 Units into the skin 3 times daily Plus sliding scale 2 1/25/21 3/2/21  Tessie Britton MD   insulin glargine (LANTUS SOLOSTAR) 100 UNIT/ML injection pen Inject 75 Units into the skin 2 times daily    Historical Provider, MD   gabapentin (NEURONTIN) 600 MG tablet Take 600 mg by mouth 4 times daily. Historical Provider, MD   rOPINIRole (REQUIP) 1 MG tablet TAKE 1 TABLET BY MOUTH THREE TIMES A DAY 12/22/20   Chon Cason MD   metoprolol tartrate (LOPRESSOR) 50 MG tablet Take 1.5 tablets by mouth 2 times daily  Patient taking differently: Take 50 mg by mouth 2 times daily  8/17/20   Chon Cason MD   empagliflozin (JARDIANCE) 25 MG tablet Take 25 mg by mouth daily 9/18/19   Chon Cason MD   DULoxetine (CYMBALTA) 60 MG extended release capsule Take by mouth daily 2 tab    Historical Provider, MD   lacosamide (VIMPAT) 100 MG TABS tablet Take 200 mg by mouth 2 times daily. Historical Provider, MD   sildenafil (VIAGRA) 100 MG tablet Take 1 tablet by mouth as needed for Erectile Dysfunction 1/29/19   Chon Cason MD   divalproex (DEPAKOTE) 500 MG DR tablet Take 500 mg by mouth 2 times daily  4/5/18   Historical Provider, MD   levETIRAcetam (KEPPRA) 500 MG tablet Take 2,000 mg by mouth 2 times daily     Historical Provider, MD   zonisamide (ZONEGRAN) 100 MG capsule Take by mouth nightly 5 capsules    Historical Provider, MD        Allergies:     Ciprofloxacin-ciproflox hcl er, Heparin, Metformin, Niacin and related, Tramadol hcl, Ultram [tramadol], and Warfarin    Social History:     Tobacco:    reports that he has never smoked. He has never used smokeless tobacco.  Alcohol:      reports no history of alcohol use. Drug Use:  reports no history of drug use.     Family History:     Family History   Problem Relation Age of Onset    Heart Disease Father 61        MI    Stroke Brother     Obesity Brother     Arthritis Mother     Seizures Mother     Obesity Sister     Other Brother         pulmonary embolism    Diabetes Daughter     Seizures Brother        Review of Systems:     Positive and Negative as described in HPI. Review of Systems   Constitutional: Positive for activity change. Negative for fever. HENT: Negative for congestion, rhinorrhea, sinus pressure, sinus pain and sore throat. Eyes: Negative for photophobia and visual disturbance. Cardiovascular: Negative for chest pain, palpitations and leg swelling. Gastrointestinal: Negative for abdominal distention, abdominal pain, constipation, diarrhea, nausea and vomiting. Genitourinary: Positive for enuresis. Negative for decreased urine volume and urgency. Musculoskeletal: Positive for gait problem. Neuropathy   Skin: Positive for wound (Left heel, right lower leg x 2 ). Negative for rash. Neurological: Positive for seizures (history of complex seizures) and weakness. Negative for dizziness and numbness. Hematological: Negative for adenopathy. Does not bruise/bleed easily. Psychiatric/Behavioral: Positive for confusion. Negative for sleep disturbance and suicidal ideas. The patient is not nervous/anxious. Physical Exam:   /71   Pulse 64   Temp 97.9 °F (36.6 °C) (Oral)   Resp 16   SpO2 95%   Temp (24hrs), Av.8 °F (36.6 °C), Min:97.5 °F (36.4 °C), Max:98 °F (36.7 °C)    Recent Labs     21  1637 21  2042 21  0617 21  1037   POCGLU 198* 243* 181* 157*     No intake or output data in the 24 hours ending 21 1730    Physical Exam  Vitals and nursing note reviewed. Constitutional:       General: He is not in acute distress. Appearance: He is obese. He is not toxic-appearing. Eyes:      Pupils: Pupils are equal, round, and reactive to light. Cardiovascular:      Rate and Rhythm: Normal rate and regular rhythm. Pulses: Normal pulses. Heart sounds: Normal heart sounds, S1 normal and S2 normal.   Pulmonary:      Effort: Pulmonary effort is normal.      Breath sounds: Normal breath sounds. Abdominal:      General: Abdomen is protuberant. Bowel sounds are normal. There is no distension. There are no signs of injury. Palpations: Abdomen is soft. Tenderness: There is no abdominal tenderness. There is no right CVA tenderness or left CVA tenderness. Genitourinary:     Comments: Incontinent  Musculoskeletal:      Cervical back: Full passive range of motion without pain and normal range of motion. Right lower leg: No edema. Left lower leg: No edema. Comments: No pain with palpation of extremities. Hx neuropathy. Did not observe gait. Feet:      Comments: See media. Lymphadenopathy:      Comments: No lymphadenopathy. Skin:     General: Skin is warm and dry. Capillary Refill: Capillary refill takes less than 2 seconds. Comments: See media . Neurological:      Mental Status: He is alert. Cranial Nerves: No facial asymmetry. Comments: Slow to respond. Oriented to self and thought he was in [facility]. Hand grasps strong and equal bilateral. Wiggles toes to command. Foot pushes equal bilaterally. Psychiatric:         Attention and Perception: He is inattentive. Mood and Affect: Affect is flat. Cognition and Memory: Cognition is impaired. Memory is impaired.        Left Heel      Right Lower Leg    Investigations:      Laboratory Testing:  Recent Results (from the past 24 hour(s))   POCT glucose    Collection Time: 07/26/21  8:42 PM   Result Value Ref Range    POC Glucose 243 (H) 70 - 108 mg/dl   POCT glucose    Collection Time: 07/27/21  6:17 AM   Result Value Ref Range    POC Glucose 181 (H) 70 - 108 mg/dl   Basic Metabolic Panel w/ Reflex to MG    Collection Time: 07/27/21  6:20 AM   Result Value Ref Range    Sodium 140 135 - 145 meq/L    Potassium reflex Magnesium 3.8 3.5 - 5.2 meq/L    Chloride 109 98 - 111 meq/L    CO2 21 (L) 23 - 33 meq/L    Glucose 185 (H) 70 - 108 mg/dL    BUN 7 7 - 22 mg/dL    CREATININE 0.7 0.4 - 1.2 mg/dL    Calcium 8.5 8.5 - 10.5 mg/dL   CBC    Collection Time: 07/27/21  6:20 AM   Result Value Ref Range    WBC 5.7 4.8 - 10.8 thou/mm3    RBC 4.53 (L) 4.70 - 6.10 mill/mm3    Hemoglobin 13.8 (L) 14.0 - 18.0 gm/dl    Hematocrit 40.4 (L) 42.0 - 52.0 %    MCV 89.2 80.0 - 94.0 fL    MCH 30.5 26.0 - 33.0 pg    MCHC 34.2 32.2 - 35.5 gm/dl    RDW-CV 12.3 11.5 - 14.5 %    RDW-SD 40.0 35.0 - 45.0 fL    Platelets 970 033 - 896 thou/mm3    MPV 9.7 9.4 - 12.4 fL   Anion Gap    Collection Time: 07/27/21  6:20 AM   Result Value Ref Range    Anion Gap 10.0 8.0 - 16.0 meq/L   Glomerular Filtration Rate, Estimated    Collection Time: 07/27/21  6:20 AM   Result Value Ref Range    Est, Glom Filt Rate >90 ml/min/1.73m2   POCT glucose    Collection Time: 07/27/21 10:37 AM   Result Value Ref Range    POC Glucose 157 (H) 70 - 108 mg/dl       Imaging/Diagnostics:  CT TIBIA FIBULA LEFT WO CONTRAST    Result Date: 7/20/2021  1. Degenerative changes of the left knee. Postsurgical changes left knee. Multiple varicosities this left calf. 2. Study otherwise unremarkable. No tibial or fibular fracture is seen. **This report has been created using voice recognition software. It may contain minor errors which are inherent in voice recognition technology. ** Final report electronically signed by Dr. Joe Stallings on 7/20/2021 9:07 PM    MRI ANKLE LEFT W WO CONTRAST    Result Date: 7/21/2021   1. Increased signal intensity in the plantar soft tissues over the calcaneus which may represent a clinically described ulcer. No definite evidence of osteomyelitis in the underlying calcaneus. 2. Slightly increased fluid in the distal tibialis posterior tendon and along the peroneus brevis and longus tendon sheaths.  3. Increased signal intensity in the interosseous ligament between the talus and calcaneus which may be torn. 4. Abnormal signal intensity in the muscles along the medial aspect of the hindfoot. This may represent a tear of the abductor hallucis muscle belly. 5. Increased signal intensity in the flexor digitorum brevis muscle belly which may represent a partial tear. 6. Thickening at the attachment of the plantar fascia upon the calcaneus. 7. Small osteochondral defect in the medial talar dome. 8. Areas of abnormal signal intensity in the anterior process of the calcaneus, base of cuboid bone and tip of lateral malleolus suggestive of degenerative changes. 9. Increased signal intensity in the subcutaneous soft tissues over the medial and lateral aspects of the hindfoot consistent with edema and/or cellulitis. 10. Fluid collection along the talonavicular joint. **This report has been created using voice recognition software. It may contain minor errors which are inherent in voice recognition technology. ** Final report electronically signed by DR Johnathan Delacruz on 7/21/2021 3:25 PM      Assessment :      Hospital Problems         Last Modified POA    * (Principal) Seizures ([address] 7/27/2021 Yes    Hypertension 7/27/2021 Yes    Type 2 diabetes mellitus with diabetic neuropathy ([address] 7/27/2021 Yes    Neuropathy 7/27/2021 Yes    Bipolar affective disorder ([address] 7/27/2021 Yes    Partial symptomatic epilepsy with complex partial seizures, not intractable, without status epilepticus ([address] 7/27/2021 Yes    Gastroesophageal reflux disease 7/27/2021 Yes    Diabetic leg ulcer ([address] 7/27/2021 Yes          Plan:     Patient status inpatient in the Progressive Unit/Step down    1. Patient consult to neurology. Appreciate input. Will continue home doses of antiepileptics. Send antiepileptic medication levels. Seizure precautions. EEG and imaging per neurology recommendations. 2. CBC/BMP/ammonia level now. Trend. Replete electrolytes as needed. Trend kidney function.   Trend hematology. 3. Manage hypertension: Continue home medications. Continuous cardiac monitoring. Vital signs per unit. 4. Manage DM: Accu-Chek AC and at bedtime. Hypoglycemia treatment orders as needed. ISS coverage. Home continue home Lantus twice daily. Carb controlled diet. 5. Pharmacy to dose vancomycin for left heel wound. 6. Continue home medication for mental health. 7. GI prophylaxis: Protonix 40 mg p.o. twice daily  8. DVT prophylaxis: Lovenox 40 mg subcu daily  9. Case management for home going needs and safety needs. at home. 10. PT/OT eval and treat    Consultations:   IP CONSULT TO NEUROLOGY  PHARMACY TO DOSE VANCOMYCIN    Patient is admitted as inpatient status because of co-morbidities listed above, severity of signs and symptoms as outlined, requirement for current medical therapies and most importantly because of direct risk to patient if care not provided in a hospital setting. Expected length of stay > 48 hours.     NICANOR Britt NP  7/27/2021  5:30 PM    Copy sent to Dr. Elmer Gorman MD

## 2021-07-27 NOTE — PLAN OF CARE
Problem: Pain:  Goal: Pain level will decrease  Description: Pain level will decrease  Outcome: Ongoing  Note: Patient reports pain at a 10 on scale. Patient states oral medication helping to achieve pain goal of no pain on scale. Patient is able to reposition frequently, elevate BLE, and use pillows for support. Goal: Control of acute pain  Description: Control of acute pain  Outcome: Ongoing  Goal: Control of chronic pain  Description: Control of chronic pain  Outcome: Ongoing     Problem: Falls - Risk of:  Goal: Will remain free from falls  Description: Will remain free from falls  Outcome: Ongoing  Note: Patient is not using call light appropriately to call for assistance. Patient is impulsive and bed alarm goes off frequently. Patient is compliant with use of non-skid slippers. Patient verbalizes understanding of fall prevention when discussed, but does not actively follow precautions. Bed alarm remains in place and increased frequency of rounds required. Goal: Absence of physical injury  Description: Absence of physical injury  Outcome: Ongoing  Note: Patient remains free from injury at this time. Problem: Discharge Planning:  Goal: Discharged to appropriate level of care  Description: Discharged to appropriate level of care  Outcome: Ongoing  Note: Patient is to be transferred to [facility]. V's 7/27/21. Awaiting transportation. Problem: Serum Glucose Level - Abnormal:  Goal: Ability to maintain appropriate glucose levels will improve  Description: Ability to maintain appropriate glucose levels will improve  Outcome: Ongoing  Note: See results review. Problem: Sensory Perception - Impaired:  Goal: Ability to maintain a stable neurologic state will improve  Description: Ability to maintain a stable neurologic state will improve  Outcome: Ongoing  Note: Patient goes between being drowsy and flat affect to awake and talking this shift.  Patient able to verbalize name and birthday, but does not always know where he is. Patient able to verbalize what he is here for. Unable to verbalize month and year. Problem: Skin Integrity:  Goal: Will show no infection signs and symptoms  Description: Will show no infection signs and symptoms  Outcome: Ongoing  Note: Patients dressings to RLE changed after being saturated with urine. Left heel dressing remains dry and intact at this time. Scattered bruising noted in various stages of healing. Redness noted to buttocks. Patient understands the importance of frequent repositioning in order to prevent any skin breakdown. Patient turns self frequently. Goal: Absence of new skin breakdown  Description: Absence of new skin breakdown  Outcome: Ongoing  Note: No new skin breakdown noted. Problem: DISCHARGE BARRIERS  Goal: Patient's continuum of care needs are met  Outcome: Ongoing  Note: Patient is to be [facility] 7/27/21. Awaiting transportation. Problem: Nutrition  Goal: Optimal nutrition therapy  Outcome: Ongoing  Note: Patient encouraged to eat small snacks, but is not interested in eating. Wife even brought food in from outside of hospital and patient is not interested at this time. No nausea noted. Care plan reviewed with patient. Patient verbalizes understanding of the plan of care and contributes to goal setting.

## 2021-07-27 NOTE — PROGRESS NOTES
Pharmacy Note  Vancomycin Consult    Mattie Krishnamurthy is a 46 y.o. male started on Vancomycin for soft tissue infection; consult received from Dr. Leidy Vargas to manage therapy. Patient Active Problem List   Diagnosis    Depression    Liver disease    Restless legs syndrome    Chronic back pain    Other chest pain    Irritable bowel syndrome    Non compliance w medication regimen    Hypertension associated with diabetes ([address]    Hyperlipidemia with target LDL less than 100    IBS (irritable bowel syndrome)    Sinus tachycardia    Confusion    Encephalopathy    Diabetes mellitus type II, uncontrolled (HCC)    Dizziness    Neuropathy    Hyperammonemia (HCC)    Medication overdose, insulin    Suicide attempt ([address]    Hypoglycemia    Hypokalemia    Lithium toxicity    Nausea & vomiting    Snoring    Hypogonadism    Erectile dysfunction due to diseases classified elsewhere    Rash    Headaches due to old head injury    Abdominal wall pain in left lower quadrant    Bipolar affective disorder (HCC)    Hypogonadism in male    Type 2 diabetes mellitus with diabetic neuropathy (HCC)    Hypomagnesemia    Partial seizure (HCC)    Syncope and collapse    Headache disorder    Partial symptomatic epilepsy with complex partial seizures, not intractable, without status epilepticus ([address]    Respiratory acidosis    Essential hypertension    Gastritis    Gastroesophageal reflux disease    Mixed hyperlipidemia    Acute lateral meniscus tear of right knee    Recurrent right inguinal hernia    Bicipital tendinitis of right shoulder    Carpal tunnel syndrome of right wrist    Obesity (BMI 30-39. 9)    Lump of axilla    Familial hypercholesterolemia    Coronary artery disease of native artery of native heart with stable angina pectoris (HCC)    Exertional dyspnea    Vitamin D deficiency    Seizure (HCC)    Nausea and vomiting    Type II diabetes mellitus with manifestations, uncontrolled (HCC)    Chondromalacia of knee, right    Effusion of left knee    Elevated levels of transaminase & lactic acid dehydrogenase    Other intervertebral disc degeneration, lumbar region    Palpitations    Splenomegaly    Sprain of right foot    Steatohepatitis    Osteopenia    Obstructive sleep apnea    Follicular neoplasm of thyroid    Nasal obstruction    Nasal septal deviation    Chest pain    MISTI (obstructive sleep apnea)    Acute post-operative pain    Bipolar 1 disorder, depressed (HCC)    Status post uvulopalatopharyngoplasty    Status post partial thyroidectomy    Wound infection    Diabetic leg ulcer (HCC)    Seizures (HCC)     Allergies:  Ciprofloxacin-ciproflox hcl er, Heparin, Metformin, Niacin and related, Tramadol hcl, Ultram [tramadol], and Warfarin     Temp max: 97.9    Recent Labs     07/26/21  0533 07/27/21  0620   BUN 6* 7   CREATININE 0.7 0.7   WBC 5.9 5.7     No intake or output data in the 24 hours ending 07/27/21 1704  Culture Date      Source                       Results  See previous encounter    Ht Readings from Last 1 Encounters:   07/20/21 6' 2\" (1.88 m)        Wt Readings from Last 1 Encounters:   07/27/21 251 lb 11.2 oz (114.2 kg)       There is no height or weight on file to calculate BMI. CrCl cannot be calculated (Unknown ideal weight.). Goal Trough Level: 10-15 mcg/mL    Assessment/Plan:  Will initiate Vancomycin 1250 mg IV every 8 hours. Same dose from when patient was at [address] [place] [address]. Timing of trough level will be determined based on culture results, renal function, and clinical response. Thank you for the consult. Will continue to follow.     Eunice Brisker Pharm D.  7/27/2021  5:06 PM

## 2021-07-27 NOTE — CARE COORDINATION
7/27/21, 9:02 AM EDT    Patient goals/plan/ treatment preferences discussed by  and . Patient goals/plan/ treatment preferences reviewed with patient/ family. Patient/ family verbalize understanding of discharge plan and are in agreement with goal/plan/treatment preferences. Understanding was demonstrated using the teach back method. AVS provided by RN at time of discharge, which includes all necessary medical information pertaining to the patients current course of illness, treatment, post-discharge goals of care, and treatment preferences.         IMM Letter  IMM Letter given to Patient/Family/Significant other/Guardian/POA/by[de-identified]   IMM Letter date given[de-identified] 07/27/21  IMM Letter time given[de-identified] 0900     [facility] call [facility]

## 2021-07-28 ENCOUNTER — APPOINTMENT (OUTPATIENT)
Dept: MRI IMAGING | Age: 51
DRG: 056 | End: 2021-07-28
Attending: INTERNAL MEDICINE
Payer: MEDICARE

## 2021-07-28 LAB
ABSOLUTE EOS #: 0.11 K/UL (ref 0–0.44)
ABSOLUTE IMMATURE GRANULOCYTE: 0.09 K/UL (ref 0–0.3)
ABSOLUTE LYMPH #: 1.46 K/UL (ref 1.1–3.7)
ABSOLUTE MONO #: 0.38 K/UL (ref 0.1–1.2)
ALBUMIN SERPL-MCNC: 3.4 G/DL (ref 3.5–5.2)
ALBUMIN SERPL-MCNC: 3.4 G/DL (ref 3.5–5.2)
ALBUMIN/GLOBULIN RATIO: 1 (ref 1–2.5)
ALP BLD-CCNC: 160 U/L (ref 40–129)
ALT SERPL-CCNC: 53 U/L (ref 5–41)
ANION GAP SERPL CALCULATED.3IONS-SCNC: 11 MMOL/L (ref 9–17)
ANION GAP SERPL CALCULATED.3IONS-SCNC: 14 MMOL/L (ref 9–17)
AST SERPL-CCNC: 35 U/L
BASOPHILS # BLD: 0 % (ref 0–2)
BASOPHILS ABSOLUTE: <0.03 K/UL (ref 0–0.2)
BILIRUB SERPL-MCNC: 0.53 MG/DL (ref 0.3–1.2)
BILIRUBIN DIRECT: 0.16 MG/DL
BILIRUBIN, INDIRECT: 0.37 MG/DL (ref 0–1)
BUN BLDV-MCNC: 6 MG/DL (ref 6–20)
BUN BLDV-MCNC: 8 MG/DL (ref 6–20)
BUN/CREAT BLD: ABNORMAL (ref 9–20)
BUN/CREAT BLD: ABNORMAL (ref 9–20)
CALCIUM SERPL-MCNC: 8.5 MG/DL (ref 8.6–10.4)
CALCIUM SERPL-MCNC: 8.5 MG/DL (ref 8.6–10.4)
CHLORIDE BLD-SCNC: 106 MMOL/L (ref 98–107)
CHLORIDE BLD-SCNC: 108 MMOL/L (ref 98–107)
CO2: 20 MMOL/L (ref 20–31)
CO2: 20 MMOL/L (ref 20–31)
CREAT SERPL-MCNC: 0.63 MG/DL (ref 0.7–1.2)
CREAT SERPL-MCNC: 0.63 MG/DL (ref 0.7–1.2)
DIFFERENTIAL TYPE: ABNORMAL
EOSINOPHILS RELATIVE PERCENT: 2 % (ref 1–4)
GFR AFRICAN AMERICAN: >60 ML/MIN
GFR AFRICAN AMERICAN: >60 ML/MIN
GFR NON-AFRICAN AMERICAN: >60 ML/MIN
GFR NON-AFRICAN AMERICAN: >60 ML/MIN
GFR SERPL CREATININE-BSD FRML MDRD: ABNORMAL ML/MIN/{1.73_M2}
GLOBULIN: ABNORMAL G/DL (ref 1.5–3.8)
GLUCOSE BLD-MCNC: 147 MG/DL (ref 75–110)
GLUCOSE BLD-MCNC: 160 MG/DL (ref 70–99)
GLUCOSE BLD-MCNC: 168 MG/DL (ref 75–110)
GLUCOSE BLD-MCNC: 170 MG/DL (ref 75–110)
GLUCOSE BLD-MCNC: 182 MG/DL (ref 75–110)
GLUCOSE BLD-MCNC: 193 MG/DL (ref 70–99)
GLUCOSE BLD-MCNC: 249 MG/DL (ref 75–110)
HCT VFR BLD CALC: 38.6 % (ref 40.7–50.3)
HCT VFR BLD CALC: 39 % (ref 40.7–50.3)
HEMOGLOBIN: 13.5 G/DL (ref 13–17)
HEMOGLOBIN: 13.5 G/DL (ref 13–17)
IMMATURE GRANULOCYTES: 2 %
LACOSAMIDE: 9.9 UG/ML (ref 5–10)
LYMPHOCYTES # BLD: 26 % (ref 24–43)
MCH RBC QN AUTO: 29.8 PG (ref 25.2–33.5)
MCH RBC QN AUTO: 30 PG (ref 25.2–33.5)
MCHC RBC AUTO-ENTMCNC: 34.6 G/DL (ref 28.4–34.8)
MCHC RBC AUTO-ENTMCNC: 35 G/DL (ref 28.4–34.8)
MCV RBC AUTO: 85.2 FL (ref 82.6–102.9)
MCV RBC AUTO: 86.7 FL (ref 82.6–102.9)
MONOCYTES # BLD: 7 % (ref 3–12)
NRBC AUTOMATED: 0 PER 100 WBC
NRBC AUTOMATED: 0 PER 100 WBC
PDW BLD-RTO: 11.9 % (ref 11.8–14.4)
PDW BLD-RTO: 11.9 % (ref 11.8–14.4)
PHOSPHORUS: 3.1 MG/DL (ref 2.5–4.5)
PLATELET # BLD: 254 K/UL (ref 138–453)
PLATELET # BLD: 259 K/UL (ref 138–453)
PLATELET ESTIMATE: ABNORMAL
PMV BLD AUTO: 9.5 FL (ref 8.1–13.5)
PMV BLD AUTO: 9.6 FL (ref 8.1–13.5)
POTASSIUM SERPL-SCNC: 3.7 MMOL/L (ref 3.7–5.3)
POTASSIUM SERPL-SCNC: 3.8 MMOL/L (ref 3.7–5.3)
RBC # BLD: 4.5 M/UL (ref 4.21–5.77)
RBC # BLD: 4.53 M/UL (ref 4.21–5.77)
RBC # BLD: ABNORMAL 10*6/UL
SEG NEUTROPHILS: 63 % (ref 36–65)
SEGMENTED NEUTROPHILS ABSOLUTE COUNT: 3.46 K/UL (ref 1.5–8.1)
SODIUM BLD-SCNC: 137 MMOL/L (ref 135–144)
SODIUM BLD-SCNC: 142 MMOL/L (ref 135–144)
THYROXINE, FREE: 1.17 NG/DL (ref 0.93–1.7)
TOTAL PROTEIN: 6.8 G/DL (ref 6.4–8.3)
TSH SERPL DL<=0.05 MIU/L-ACNC: 1.72 MIU/L (ref 0.3–5)
VANCOMYCIN TROUGH DATE LAST DOSE: NORMAL
VANCOMYCIN TROUGH DOSE AMOUNT: NORMAL
VANCOMYCIN TROUGH TIME LAST DOSE: NORMAL
VANCOMYCIN TROUGH: 13.6 UG/ML (ref 10–20)
WBC # BLD: 5.5 K/UL (ref 3.5–11.3)
WBC # BLD: 5.9 K/UL (ref 3.5–11.3)
WBC # BLD: ABNORMAL 10*3/UL

## 2021-07-28 PROCEDURE — 80069 RENAL FUNCTION PANEL: CPT

## 2021-07-28 PROCEDURE — 6370000000 HC RX 637 (ALT 250 FOR IP): Performed by: CLINICAL NURSE SPECIALIST

## 2021-07-28 PROCEDURE — 80048 BASIC METABOLIC PNL TOTAL CA: CPT

## 2021-07-28 PROCEDURE — 36415 COLL VENOUS BLD VENIPUNCTURE: CPT

## 2021-07-28 PROCEDURE — APPSS60 APP SPLIT SHARED TIME 46-60 MINUTES: Performed by: NURSE PRACTITIONER

## 2021-07-28 PROCEDURE — 97530 THERAPEUTIC ACTIVITIES: CPT

## 2021-07-28 PROCEDURE — 6360000002 HC RX W HCPCS: Performed by: NURSE PRACTITIONER

## 2021-07-28 PROCEDURE — 6360000002 HC RX W HCPCS: Performed by: INTERNAL MEDICINE

## 2021-07-28 PROCEDURE — 2580000003 HC RX 258: Performed by: CLINICAL NURSE SPECIALIST

## 2021-07-28 PROCEDURE — 84100 ASSAY OF PHOSPHORUS: CPT

## 2021-07-28 PROCEDURE — 85025 COMPLETE CBC W/AUTO DIFF WBC: CPT

## 2021-07-28 PROCEDURE — 95714 VEEG EA 12-26 HR UNMNTR: CPT

## 2021-07-28 PROCEDURE — 84443 ASSAY THYROID STIM HORMONE: CPT

## 2021-07-28 PROCEDURE — 2580000003 HC RX 258: Performed by: INTERNAL MEDICINE

## 2021-07-28 PROCEDURE — 6370000000 HC RX 637 (ALT 250 FOR IP)

## 2021-07-28 PROCEDURE — 80076 HEPATIC FUNCTION PANEL: CPT

## 2021-07-28 PROCEDURE — 95700 EEG CONT REC W/VID EEG TECH: CPT

## 2021-07-28 PROCEDURE — 82248 BILIRUBIN DIRECT: CPT

## 2021-07-28 PROCEDURE — 80202 ASSAY OF VANCOMYCIN: CPT

## 2021-07-28 PROCEDURE — 85027 COMPLETE CBC AUTOMATED: CPT

## 2021-07-28 PROCEDURE — 80053 COMPREHEN METABOLIC PANEL: CPT

## 2021-07-28 PROCEDURE — 99222 1ST HOSP IP/OBS MODERATE 55: CPT | Performed by: PSYCHIATRY & NEUROLOGY

## 2021-07-28 PROCEDURE — 2060000000 HC ICU INTERMEDIATE R&B

## 2021-07-28 PROCEDURE — 99232 SBSQ HOSP IP/OBS MODERATE 35: CPT | Performed by: INTERNAL MEDICINE

## 2021-07-28 PROCEDURE — 99222 1ST HOSP IP/OBS MODERATE 55: CPT | Performed by: INTERNAL MEDICINE

## 2021-07-28 PROCEDURE — 97162 PT EVAL MOD COMPLEX 30 MIN: CPT

## 2021-07-28 PROCEDURE — 84439 ASSAY OF FREE THYROXINE: CPT

## 2021-07-28 RX ORDER — INSULIN GLARGINE 100 [IU]/ML
45 INJECTION, SOLUTION SUBCUTANEOUS 2 TIMES DAILY
Status: DISCONTINUED | OUTPATIENT
Start: 2021-07-28 | End: 2021-07-30

## 2021-07-28 RX ADMIN — METOPROLOL TARTRATE 75 MG: 25 TABLET ORAL at 20:33

## 2021-07-28 RX ADMIN — ROPINIROLE HYDROCHLORIDE 1 MG: 1 TABLET, FILM COATED ORAL at 08:45

## 2021-07-28 RX ADMIN — ENOXAPARIN SODIUM 40 MG: 40 INJECTION SUBCUTANEOUS at 08:45

## 2021-07-28 RX ADMIN — DULOXETINE HYDROCHLORIDE 60 MG: 30 CAPSULE, DELAYED RELEASE ORAL at 08:45

## 2021-07-28 RX ADMIN — DIVALPROEX SODIUM 500 MG: 500 TABLET, DELAYED RELEASE ORAL at 08:45

## 2021-07-28 RX ADMIN — Medication 1250 MG: at 07:29

## 2021-07-28 RX ADMIN — LACOSAMIDE 200 MG: 100 TABLET, FILM COATED ORAL at 08:45

## 2021-07-28 RX ADMIN — ROPINIROLE HYDROCHLORIDE 1 MG: 1 TABLET, FILM COATED ORAL at 20:34

## 2021-07-28 RX ADMIN — LEVETIRACETAM 2000 MG: 500 TABLET, FILM COATED ORAL at 20:33

## 2021-07-28 RX ADMIN — Medication 1250 MG: at 13:51

## 2021-07-28 RX ADMIN — SODIUM CHLORIDE, PRESERVATIVE FREE 10 ML: 5 INJECTION INTRAVENOUS at 20:34

## 2021-07-28 RX ADMIN — DIVALPROEX SODIUM 750 MG: 500 TABLET, DELAYED RELEASE ORAL at 20:33

## 2021-07-28 RX ADMIN — LEVETIRACETAM 2000 MG: 500 TABLET, FILM COATED ORAL at 08:46

## 2021-07-28 RX ADMIN — PANTOPRAZOLE SODIUM 40 MG: 40 TABLET, DELAYED RELEASE ORAL at 08:45

## 2021-07-28 RX ADMIN — METOPROLOL TARTRATE 75 MG: 25 TABLET ORAL at 08:45

## 2021-07-28 RX ADMIN — Medication 1250 MG: at 20:34

## 2021-07-28 RX ADMIN — SODIUM CHLORIDE, PRESERVATIVE FREE 10 ML: 5 INJECTION INTRAVENOUS at 08:46

## 2021-07-28 RX ADMIN — INSULIN LISPRO 2 UNITS: 100 INJECTION, SOLUTION INTRAVENOUS; SUBCUTANEOUS at 22:35

## 2021-07-28 RX ADMIN — Medication 1 CAPSULE: at 08:45

## 2021-07-28 RX ADMIN — ZONISAMIDE 500 MG: 100 CAPSULE ORAL at 20:34

## 2021-07-28 RX ADMIN — PANTOPRAZOLE SODIUM 40 MG: 40 TABLET, DELAYED RELEASE ORAL at 20:34

## 2021-07-28 RX ADMIN — LACOSAMIDE 200 MG: 100 TABLET, FILM COATED ORAL at 20:33

## 2021-07-28 RX ADMIN — ROPINIROLE HYDROCHLORIDE 1 MG: 1 TABLET, FILM COATED ORAL at 13:52

## 2021-07-28 ASSESSMENT — PAIN SCALES - GENERAL
PAINLEVEL_OUTOF10: 9
PAINLEVEL_OUTOF10: 0

## 2021-07-28 ASSESSMENT — PAIN DESCRIPTION - LOCATION: LOCATION: FOOT

## 2021-07-28 ASSESSMENT — PAIN DESCRIPTION - ORIENTATION: ORIENTATION: LEFT

## 2021-07-28 NOTE — CONSULTS
[facility] - Initial Consult Note  Today's Date and Time: 7/28/2021, 1:16 PM    Impression :   · MRSA right leg ulcerations  · Left heel wound  · Breakthrough seizure with history of epilepsy  · Diabetes mellitus type 2  · Essential hypertension  · Hyperammonemia  · Altered mentation    Recommendations:   · Vancomycin may be continued for now as patient may not be safe to take oral medication at this time. Monitor renal function closely  · Podiatry consult  · Wound care  · Speech evaluation with swallow study  · GI consult for hyperammonemia    Medical Decision Making/Summary/Discussion:7/28/2021     ·   Infection Control Recommendations   · [place] Precautions  · Contact Isolation     Antimicrobial Stewardship Recommendations     · Simplification of therapy  · Targeted therapy    Coordination of Outpatient Care:   · Estimated Length of IV antimicrobials: TBD  · Patient will need Midline Catheter Insertion: No  · Patient will need PICC line Insertion:  · Patient will need: Home IV , [place],  SNF,  LTAC:TBD  · Patient will need outpatient wound care:Yes    Chief complaint/reason for consultation:   · Cellulitis, foot abscess growing MRSA      History of Present Illness:   Anny De Leon is a 46y.o.-year-old male who was initially admitted on 7/27/2021. Patient seen at the request of Dr. Carlos Calix:    Initially, this patient with a history of complex seizure disorder and diabetes mellitus, presented to [facility] on 7/20/2021 with 2 ulcerations on his right leg and one on his left heel. He states they had first appeared approximately 3 weeks prior, but have gotten worse with foul-smelling discharge and severe pain. Wound cultures of the right leg yielded MRSA.     While at [facility], the patient underwent an I&D of the left heel wound per Podiatry and was treated for the MRSA positive right leg wounds with Vanco  per ID physician,  Dipesh. One out of 2 blood cultures was positive for coag negative staph but this was considered as a contaminant. The patient was doing well, but experienced a change in mentation and an EEG showed abnormal changes. He was going to be transferred to [place], where he follows up with his neurologist, but they had no beds available. He was then transferred to [address] [place] [address] for further neurology work-up. Per Podiatry at [place] [id number] on 7/20/21   Vascular: Dorsalis pedis and posterior tibial pulses are palpable to right foot, DP is palpable to left. PT biphasic on doppler exam left. Skin temperature is warm  to cool from proximal tibial tuberosity to distal digits. CFT brisk to exposed digits. Edema noted to left heel and ankle, non-pitting. Hair growth present to bilateral feet. Quality of skin is wnl for patient age.      Dermatologic: Open lesion x2 noted to anterior aspect of right leg. The proximal lesion measures 2cm in diameter and displays a necrotic base with orange exudate present. The more distal lesion measures 3.5 cm in diameter with a necrotic base, serosanquinous and purulent drainage noted. To both lesions, negative probe to bone, negative malodor. Puncture wound with underlying eschar noted beneath superficial skin to left heel, plantar aspect. Post-debridement: left heel expresses 5cc of purulent drainage, probes to calcaneus. Extensive malodor present.      Neurovascular: Light touch sensation grossly absent to bilateral feet, restored at mid calf.      Musculoskeletal: Muscle strength 5/5 for all plantarflexors, dorsiflexors, inverters and everters to RLE, 4/5 for all quadrants LLE. Exquisite pain to palpation of left heel, and left ankle to the medial and posterior aspects. Cavus foot type noted bilateral. Semi-rigid contractures of digits 1-5 bilateral noted.  No amputations noted bilateral.       RLE   7/20/21       LLE post-debridement        7/24/21         CURRENT EVALUATION 7/28/2021    Afebrile  Vital signs stable    On evaluation, the patient nods appropriately to questions but does not verbalize. He was being connected to the EEG for LTME per neurology. The patient's wife was in the room and said he has been swallowing pills with no noticeable issue, but a speech evaluation may be beneficial due to the patient's altered mentation and the risk of aspiration. Of note is an ammonia level of 109 which may be the cause of the patient's AMS. His liver profile from the 20th was normal.    We will continue to follow    Labs, X rays reviewed: 7/28/2021    BUN:8  Cr:0.63  CRP: 3.46    WBC:5.9  Hb:13.5  Plat: 254    Cultures:  Urine:  ·   Blood:  ·   Sputum :  ·   Wound:  · 7/20/2021: MRSA    Images:    7/27/21 7/27/2021    Discussed with patient, RN, family. I have personally reviewed the past medical history, past surgical history, medications, social history, and family history, and I have updated the database accordingly.   Past Medical History:     Past Medical History:   Diagnosis Date    Abnormal thyroid biopsy     s/p left hemithyroidectomy 1/0846 - follicular adenoma    Acid reflux     Anemia     resolved - previously seen by Dr. Caitlin Brian (due to enlarged spleen)    Anesthesia     seizures with brain surgery due to blood sugar got really high    Bipolar disorder ([facility]     Blood clot in vein 04/07/2016    Yadira Coma     Cancer [facility]) 04/2019    thyroid    Chest pain     previously seeing [facility] cardiologist, now seeing Dr. Luke Carbajal (LAD bridging on cath 4/2016)    Chronic back pain     Chronic bronchitis ([facility]     Dr. Elkins Seen Colon polyp 08/30/2016    Depression     seeing Aditi Poster DVT (deep venous thrombosis) ([facility] [address] [phone]    not on Coumadin due to unable to regulate - Dr. Amy Ny - no etiology found per patient    Esophageal abnormality     nodule     Fatty liver disease, nonalcoholic     U/S [id number]/9872 [facility]    Furuncle     legs    History of Doppler ultrasound 05/29/2011    No hemodynamically significant carotid stenosis is identified. A thyroid nodule on each side. Dedicated ultrasound of thyroid gland is suggested to further evaluate if clinically indicated.  History of pulmonary embolism 2007    s/p GFF, related to knee surgery    Hx of blood clots     leg and lung    Hyperlipidemia     severely elevated triglycerides    Hypertension     diastolic    Intracranial arachnoid cyst 11/2012    Dr. Jarrett Dominguez drained, complicated with seizures, DKA    Irritable bowel syndrome     Liver disease     [place] Myranda - elevated LFT - positive smooth muscle antibody, steatosis per liver bx 3/2014 with Dr. Libby Wright (multiple drug resistant organisms) resistance 2012    MRSA (methicillin resistant staph aureus) culture positive     h/o in foot and before brain surgery    Nephrolithiasis     noted on CT abdomen 9/2016, 6/2019    Neuropathy     Pancreatic insufficiency     Positive SANDY (antinuclear antibody)     Dr. Anne Kolb - first visit in 6/2013    Prolonged emergence from general anesthesia     Restless legs syndrome     Seizures ([address]     started with brain surgery    Sinus tachycardia     Holter 3/2013 - seeing Dr. Rosa Carrera fracture [facility])     clips     Sleep apnea     positive sleep apnea per study 10/2020.     Systolic dysfunction     \"systolic bridge\"  EF 81% ECHO 9/2019    Type II or unspecified type diabetes mellitus without mention of complication, not stated as uncontrolled 2007       Past Surgical  History:     Past Surgical History:   Procedure Laterality Date    CARDIAC CATHETERIZATION      2011,5-6 years ago   [address]  3-2012   [address]  04/28/2016    [facility]     CARPAL TUNNEL RELEASE  01/2017    CHOLECYSTECTOMY  2004    COLONOSCOPY  2012    COLONOSCOPY  08/2016    2 tubular adenomas - reportedly needs repeat scope in 6 months    CRANIOTOMY  11/2012    arachnoid cyst drainage    EKG 12-LEAD 10/18/2015         ENDOSCOPY, COLON, DIAGNOSTIC      [facility]ERN[facility]PA[facility] Right [facility]    [facility]Dr. Maisha Alexander    INGUINAL HERNIA REPAIR Right 09/15/2016    Robotic assisted    KNEE ARTHROSCOPY Right 2016    KNEE SURGERY Left 2007    acl and debrided twice    OTHER SURGICAL HISTORY  5-31-11    Tilt table was associated w/ nonspecific symptoms or nausea, otherwise no significant dizziness or syncope. No significant hemodynamic changes. Otherwise, unremarkable tilt table test after 30 minutes of tilting.  OTHER SURGICAL HISTORY  01/20/2017    RIGHT SHOULDER ARTHROSCOPY, OPEN STAN, OPEN ACROMIOPLASTY, BICEP TENDESIS, RIGHT CARPAL TUNNEL RELEASE    SHOULDER SURGERY Right 01/2007    THYROID LOBECTOMY N/A 1/15/2021    THYROID LOBECTOMY, UVULOPALATOPHARYNGOPLAST LAOP performed by Courtney Haque MD at [facility] ECHOCARDIOGRAM  3-05-11    LV size and systolic function normal. EF 55-65%.      UPPER GASTROINTESTINAL ENDOSCOPY  2012    UPPER GASTROINTESTINAL ENDOSCOPY  2016    Dr. Irwin Barry Left 10/22/2019    EGD DILATION SAVORY performed by Alix Dyer MD at [facility]    UPPER GASTROINTESTINAL ENDOSCOPY Left 10/22/2019    EGD BIOPSY performed by Alix Dyer MD at [address]  2007       Medications:      insulin glargine  45 Units Subcutaneous BID    vancomycin (VANCOCIN) intermittent dosing (placeholder)   Other RX Placeholder    zonisamide  500 mg Oral Nightly    DULoxetine  60 mg Oral Daily    empagliflozin  25 mg Oral Daily    pantoprazole  40 mg Oral BID    levETIRAcetam  2,000 mg Oral BID    metoprolol tartrate  75 mg Oral BID    rOPINIRole  1 mg Oral TID    insulin lispro  0-12 Units Subcutaneous TID WC    insulin lispro  0-6 Units Subcutaneous Nightly    divalproex  500 mg Oral BID    lacosamide  200 mg Oral BID    lactobacillus  1 capsule Oral Daily    sodium chloride flush  5-40 mL Intravenous 2 times per day    vancomycin  1,250 mg Intravenous Q8H    enoxaparin  40 mg Subcutaneous Daily       Social History:     Social History     Socioeconomic History    Marital status:      Spouse name: Not on file    Number of children: 3    Years of education: Not on file    Highest education level: Not on file   Occupational History    Occupation: Disability since 2011   Tobacco Use    Smoking status: Never Smoker    Smokeless tobacco: Never Used   Vaping Use    Vaping Use: Never used   Substance and Sexual Activity    Alcohol use: No     Alcohol/week: 0.0 standard drinks    Drug use: No    Sexual activity: Not on file   Other Topics Concern    Not on file   Social History Narrative    Not on file     Social Determinants of Health     Financial Resource Strain: Medium Risk    Difficulty of Paying Living Expenses: Somewhat hard   Food Insecurity: No Food Insecurity    Worried About Running Out of Food in the Last Year: Never true    Jeffry of Food in the Last Year: Never true   Transportation Needs:     Lack of Transportation (Medical):      Lack of Transportation (Non-Medical):    Physical Activity:     Days of Exercise per Week:     Minutes of Exercise per Session:    Stress:     Feeling of Stress :    Social Connections:     Frequency of Communication with Friends and Family:     Frequency of Social Gatherings with Friends and Family:     Attends Judaism Services:     Active Member of Clubs or Organizations:     Attends Club or Organization Meetings:     Marital Status:    Intimate Partner Violence:     Fear of Current or Ex-Partner:     Emotionally Abused:     Physically Abused:     Sexually Abused:        Family History:     Family History   Problem Relation Age of Onset    Heart Disease Father 61        MI    Stroke Brother     Obesity Brother     Arthritis Mother     Seizures Mother     Obesity Sister     Other Brother         pulmonary embolism  Diabetes Daughter     Seizures Brother         Allergies:   Ciprofloxacin-ciproflox hcl er, Heparin, Metformin, Niacin and related, Tramadol hcl, Ultram [tramadol], and Warfarin     Review of Systems:   Unable to assess as patient is nonverbal at this time    Constitutional: No fevers or chills. No systemic complaints  Head: No headaches  Eyes: No double vision or blurry vision. No conjunctival inflammation. ENT: No sore throat or runny nose. . No hearing loss, tinnitus or vertigo. Cardiovascular: No chest pain or palpitations. No shortness of breath. No EWING  Lung: No shortness of breath or cough. No sputum production  Abdomen: No nausea, vomiting, diarrhea, or abdominal pain. Myra Reg No cramps. Genitourinary: No increased urinary frequency, or dysuria. No hematuria. No suprapubic or CVA pain  Musculoskeletal: No muscle aches or pains. No joint effusions, swelling or deformities  Hematologic: No bleeding or bruising. Neurologic: No headache, weakness, numbness, or tingling. Integument: No rash, no ulcers. Psychiatric: No depression. Endocrine: No polyuria, no polydipsia, no polyphagia. Physical Examination :     Patient Vitals for the past 8 hrs:   BP Temp Temp src Pulse Resp   07/28/21 1150 (!) 164/91 98.4 °F (36.9 °C) Oral 58 15   07/28/21 0735 (!) 144/75 98.1 °F (36.7 °C) Oral 63 17     General Appearance: Awake, alert, and in no apparent distress-nonverbal  Head:  Normocephalic, no trauma  Eyes: Pupils equal, round, reactive to light and accommodation; extraocular movements intact; sclera anicteric; conjunctivae pink. No embolic phenomena. ENT: Oropharynx clear, without erythema, exudate, or thrush. No tenderness of sinuses. Mouth/throat: mucosa pink and moist. No lesions. Dentition in good repair. Neck:Supple, without lymphadenopathy. Thyroid normal, No bruits. Pulmonary/Chest: Clear to auscultation, without wheezes, rales, or rhonchi. No dullness to percussion.    Cardiovascular: Regular rate and rhythm without murmurs, rubs, or gallops. Abdomen: Soft, non tender. Bowel sounds normal. No organomegaly  All four Extremities: No cyanosis, clubbing, edema, or effusions. Neurologic: No gross sensory or motor deficits. Skin: 2 healing ulcerations on the right lower leg and 1 left foot wound status post I&D sutures clean dry intact  Medical Decision Making -Laboratory:   I have independently reviewed/ordered the following labs:    CBC with Differential:   Recent Labs     07/27/21  1811 07/28/21  0507   WBC 5.0 5.9   HGB 13.4 13.5   HCT 38.5* 39.0*    254   LYMPHOPCT 25  --    MONOPCT 8  --      BMP:   Recent Labs     07/27/21  1811 07/28/21  0507    142   K 3.8 3.7    108*   CO2 20 20   BUN 8 8   CREATININE 0.57* 0.63*     Hepatic Function Panel: No results for input(s): PROT, LABALBU, BILIDIR, IBILI, BILITOT, ALKPHOS, ALT, AST in the last 72 hours. No results for input(s): RPR in the last 72 hours. No results for input(s): HIV in the last 72 hours. No results for input(s): BC in the last 72 hours. Lab Results   Component Value Date    MUCUS Present 10/17/2015    PH 7.40 06/26/2020    PH 5.5 01/04/2012    RBC 4.50 07/28/2021    RBC 4.93 03/26/2012    WBC 5.9 07/28/2021    YEAST NONE SEEN 08/12/2019     Lab Results   Component Value Date    CREATININE 0.63 07/28/2021    GLUCOSE 160 07/28/2021    GLUCOSE 189 11/12/2015       Medical Decision Making-Imaging:   XR FOOT LEFT (MIN 3 VIEWS)     Result Date: 7/20/2021  PROCEDURE: XR FOOT LEFT (MIN 3 VIEWS) CLINICAL INFORMATION: pain COMPARISON: No prior study. TECHNIQUE: 4 views of the foot were obtained. FINDINGS: No acute fracture or dislocation is seen. There is no soft tissue swelling. There is a moderate-sized plantar calcaneal spur.      Plantar calcaneal spur. Otherwise normal left foot. **This report has been created using voice recognition software. It may contain minor errors which are inherent in voice recognition technology. ** Final report electronically signed by Dr. Joe Stallings on 7/20/2021 3:57 PM     CT TIBIA FIBULA LEFT WO CONTRAST     Result Date: 7/20/2021  PROCEDURE: NONCONTRAST CT LEFT TIBIA/FIBULA: CLINICAL INFORMATION: Infection/ abscess TECHNIQUE: Multiple axial 3 mm images of the left tibia/fibula were obtained without administration of intravenous contrast material. Computer generated sagittal and coronal images of the left tibia/fibula were also reconstructed. ALL CT SCANS AT THIS FACILITY use dose modulation, iterative reconstruction, and/or weight-based dosing when appropriate to reduce radiation dose to as low as reasonably achievable. FINDINGS: There are bony defects in the distal femur and proximal tibia conjunction with prior anterior crucial ligament repair. No acute fracture is seen. Appears be a small bone cyst in the lateral malleolus. There is mild degenerative spurring of the distal femur. Note that there appear to be multiple superficial varicosities in the distal left calf      1. Degenerative changes of the left knee. Postsurgical changes left knee. Multiple varicosities this left calf. 2. Study otherwise unremarkable. No tibial or fibular fracture is seen. **This report has been created using voice recognition software. It may contain minor errors which are inherent in voice recognition technology. ** Final report electronically signed by Dr. Joe Stallings on 7/20/2021 9:07 PM       Medical Decision Vbktvd-[id number]-[id number]:     7/23/2021  2:48 PM - Latonya Mendez Incoming Lab Results From Soft    Specimen Information: Heel        Component Collected Lab   Anaerobic Culture 07/20/2021  6:15  [facility] [address] [facility]   Culture yielded heavy mixed growth which included anaerobic gram negative bacilli and anaerobic gram positive cocci. If a true mixed aerobic and anaerobic infection is suspected, then broad spectrum empiric antibiotic therapy is indicated and should include coverage for anaerobic organisms.     Gram

## 2021-07-28 NOTE — PROCEDURES
VIDEO-EEG MONITORING REPORT      Patient Name: Jia Conte  Dates recorded: 7/28/2021  MRN: [id number]    Electroencephalographer: Kandy Mann MD       CLINICAL DETAILS:  This EEG was performed on this 46 y. o.yo male admitted for persistent seizures video study to explore semiology of events  Record commenced 1345 hours       MEDICATIONS:   Current Facility-Administered Medications:     insulin glargine (LANTUS) injection vial 45 Units, 45 Units, Subcutaneous, BID, [facility], DO    vancomycin (VANCOCIN) intermittent dosing (placeholder), , Other, RX Placeholder, Carver I Elizabeth,     zonisamide (ZONEGRAN) capsule 500 mg, 500 mg, Oral, Nightly, [place] Limb, APRN - CNS, 500 mg at 07/27/21 2110    DULoxetine (CYMBALTA) extended release capsule 60 mg, 60 mg, Oral, Daily, [place] Limb, APRN - CNS, 60 mg at 07/28/21 0845    pantoprazole (PROTONIX) tablet 40 mg, 40 mg, Oral, BID, [place] Limb, APRN - CNS, 40 mg at 07/28/21 0845    levETIRAcetam (KEPPRA) tablet 2,000 mg, 2,000 mg, Oral, BID, [place] Limb, APRN - CNS, 2,000 mg at 07/28/21 0846    metoprolol tartrate (LOPRESSOR) tablet 75 mg, 75 mg, Oral, BID, [place] Limb, APRN - CNS, 75 mg at 07/28/21 0845    rOPINIRole (REQUIP) tablet 1 mg, 1 mg, Oral, TID, [place] Limb, APRN - CNS, 1 mg at 07/28/21 1352    insulin lispro (HUMALOG) injection vial 0-12 Units, 0-12 Units, Subcutaneous, TID WC, [place] Limb, APRN - CNS    insulin lispro (HUMALOG) injection vial 0-6 Units, 0-6 Units, Subcutaneous, Nightly, [place] Limb, APRN - CNS    glucose (GLUTOSE) 40 % oral gel 15 g, 15 g, Oral, PRN, [place] Limb, APRN - CNS    dextrose 50 % IV solution, 12.5 g, Intravenous, PRN, [place] Limb, APRN - CNS    glucagon (rDNA) injection 1 mg, 1 mg, Intramuscular, PRN, [place] Limb, APRN - CNS    dextrose 5 % solution, 100 mL/hr, Intravenous, PRN, [place] Limb, APRN - CNS    divalproex (DEPAKOTE) DR tablet 500 mg, 500 mg, Oral, BID, Dionne Dials, APRN - CNS, 500 mg at 07/28/21 0845    lacosamide (VIMPAT) tablet 200 mg, 200 mg, Oral, BID, Dionne Dials, APRN - CNS, 200 mg at 07/28/21 0845    lactobacillus (CULTURELLE) capsule 1 capsule, 1 capsule, Oral, Daily, Dionne Dials, APRN - CNS, 1 capsule at 07/28/21 0845    sodium chloride flush 0.9 % injection 5-40 mL, 5-40 mL, Intravenous, 2 times per day, Dionne Dials, APRN - CNS, 10 mL at 07/28/21 0846    sodium chloride flush 0.9 % injection 10 mL, 10 mL, Intravenous, PRN, Dionne Dials, APRN - CNS    0.9 % sodium chloride infusion, 25 mL, Intravenous, PRN, Idonne Dials, APRN - CNS    potassium chloride (KLOR-CON M) extended release tablet 40 mEq, 40 mEq, Oral, PRN **OR** potassium bicarb-citric acid (EFFER-K) effervescent tablet 40 mEq, 40 mEq, Oral, PRN **OR** potassium chloride 10 mEq/100 mL IVPB (Peripheral Line), 10 mEq, Intravenous, PRN, Dionne Dials, APRN - CNS    magnesium sulfate 1000 mg in dextrose 5% 100 mL IVPB, 1,000 mg, Intravenous, PRN, Dionne Dials, APRN - CNS    ondansetron (ZOFRAN-ODT) disintegrating tablet 4 mg, 4 mg, Oral, Q8H PRN **OR** ondansetron (ZOFRAN) injection 4 mg, 4 mg, Intravenous, Q6H PRN, Dionne Dials, APRN - CNS    polyethylene glycol (GLYCOLAX) packet 17 g, 17 g, Oral, Daily PRN, Dionne Dials, APRN - CNS    acetaminophen (TYLENOL) tablet 650 mg, 650 mg, Oral, Q6H PRN **OR** acetaminophen (TYLENOL) suppository 650 mg, 650 mg, Rectal, Q6H PRN, Dionne Dials, APRN - CNS    vancomycin (VANCOCIN) 1250 mg in dextrose 5 % 250 mL IVPB, 1,250 mg, Intravenous, Q8H, Mohammad I Mashaleh, DO, Last Rate: 166.7 mL/hr at 07/28/21 1351, 1,250 mg at 07/28/21 1351    enoxaparin (LOVENOX) injection 40 mg, 40 mg, Subcutaneous, Daily, NICANOR Hall NP, 40 mg at 07/28/21 0879      TECHNICAL DETAILS:  Continuous video-EEG monitoring was performed with 27 surface scalp electrodes placed according to the International 10-20 electrode placement system, using a 32-channel [employer] headbox. All EEG and video information was acquired digitally, including the use of automated spike and seizure detection software to detect epileptiform activity. An event button was also available to be depressed during clinical events. RESULTS:    This was an abnormal EEG due to:  1. Continuous generalized attenuated 0.5 -1 hz delta slowing of the background with occasional faster frequencies that shows minimal reactivity. Rhythmic bifrontal sharpslow sharp discharges occurring every 1 to 2 seconds are noted in the left frontal region consistent with PLEDS. These extend across the midline and are reflected in the right frontal area as well  2. No clear anterior-posterior gradient  3. No clear sleep-wake cycle      ACTIVATION PROCEDURES  Hyperventilation and photic stimulation: not performed     EKG  A single EKG channel showed a generally regular rhythm throughout the recording    COMPARISON  None    EVENTS RECORDED NONE     Initial review [id number] hrs.   IMPRESSION   Severely abnormal record with evidence of bihemispheric dysfunction no active seizures recorded but clear-cut ictal propensity recording consistent with resolved status head dominant focus appears to be residual left frontal area      Alexis Pendleton MD [facility], [address] [place] [address] Rd., [address] of Psychiatry and Neurology

## 2021-07-28 NOTE — PROGRESS NOTES
[facility]  Office: [address], DO, Sanam Lomas, DO, Goochlandirene Gutierrez, DO, Max Smith Blood, DO, Sarai Alatorre MD, Paula Myles MD, Tuan Templeton MD, Chetan Torres MD, Fay Treadwell MD, Michael Tyson MD, Tana Wright MD, Bia Romero, DO, Moisés Davis MD, Ahmet Strickland DO, Sulma Jauregui MD,  Ismael Narayanan, DO, Christopher Rahman MD, Yung Chaney MD, Yogi Ochoa MD, Lily Worthington MD, Ap Hill MD, Minh Patel MD, Aisha Gonzalez, CNP, [facility], CNP, Al Pearce, CNP, Mahesh Vivas CNS, Burney Price, Delayne Epley, CNP, Chris Norwood, CNP, Parker Strickland, CNP, Sue Kathleen, CNP, Savanah Ramos PA-C, Lyla Szymanski, PRINCE, Shiva Hodge, CNP, Vikas Phillips, CNP, Ameena Pink, CNP, Tonia Cox, CNP, Barbra Prader, CNP, Marisela Ruiz, CNP, Trini Choi, [facility], Kenyetta Bob, [facility]    Progress Note    7/28/2021    8:11 AM    Name:   Herbert Woodall  MRN:     [id number]     Acct:      [de-identified]   Room:   [address] [place] [address] Day:  1  Admit Date:  7/27/2021  3:42 PM    PCP:   Stacia Gotti MD  Code Status:  Full Code    Subjective:     C/C: seizure  Interval History Status: not changed. Patient seen and examined with wife at bedside. Patient currently resting comfortably. He cannot answer questions appropriately and is currently oriented only to place. He denies any chest pain, nausea, vomiting, diarrhea. Vital signs have been stable over the last 24 hours. CBC, RFP relatively unremarkable. Lower extremity wound appears clean. Discussed case with neurology, plan for LTME today. Brief History: This is a 49-year-old male who was transferred here from [facility] for LTM E. He initially presented to the hospital for treatment of right lower extremity cellulitis.   He was evaluated by podiatry and started on vancomycin and Zosyn was transition to Vancomycin after cultures positive for MRSA. It was recommended he be discharged from OSH with bactrim. Review of Systems:     Constitutional:  negative for chills, fevers, sweats  Respiratory:  negative for cough, dyspnea on exertion, shortness of breath, wheezing  Cardiovascular:  negative for chest pain, chest pressure/discomfort, lower extremity edema, palpitations  Gastrointestinal:  negative for abdominal pain, constipation, diarrhea, nausea, vomiting  Neurological:  negative for dizziness, headache    Medications: Allergies:     Allergies   Allergen Reactions    Ciprofloxacin-Ciproflox Hcl Er Other (See Comments)     Elevated creatinine    Heparin      Monitor for thrombocytopenia    Metformin Nausea Only     Abdominal pain, diarrhea    Niacin And Related Other (See Comments)     Burning sensation, severe flushing    Tramadol Hcl      Contraindication to seizure medications    Ultram [Tramadol]      Contraindication to seizure medications    Warfarin      Very difficult to regulate in past       Current Meds:   Scheduled Meds:    zonisamide  500 mg Oral Nightly    DULoxetine  60 mg Oral Daily    empagliflozin  25 mg Oral Daily    pantoprazole  40 mg Oral BID    levETIRAcetam  2,000 mg Oral BID    metoprolol tartrate  75 mg Oral BID    rOPINIRole  1 mg Oral TID    insulin lispro  0-12 Units Subcutaneous TID WC    insulin lispro  0-6 Units Subcutaneous Nightly    divalproex  500 mg Oral BID    lacosamide  200 mg Oral BID    insulin glargine  75 Units Subcutaneous BID    lactobacillus  1 capsule Oral Daily    sodium chloride flush  5-40 mL Intravenous 2 times per day    vancomycin  1,250 mg Intravenous Q8H    enoxaparin  40 mg Subcutaneous Daily     Continuous Infusions:    dextrose      sodium chloride       PRN Meds: glucose, dextrose, glucagon (rDNA), dextrose, sodium chloride flush, sodium chloride, potassium chloride **OR** potassium alternative oral replacement **OR** potassium chloride, magnesium sulfate, ondansetron **OR** ondansetron, polyethylene glycol, acetaminophen **OR** acetaminophen    Data:     Past Medical History:   has a past medical history of Abnormal thyroid biopsy, Acid reflux, Anemia, Anesthesia, Bipolar disorder ([facility] Blood clot in vein, Cancer ([facility] Chest pain, Chronic back pain, Chronic bronchitis ([facility] Colon polyp, Depression, DVT (deep venous thrombosis) ([facility] Esophageal abnormality, Fatty liver disease, nonalcoholic, Furuncle, History of Doppler ultrasound, History of pulmonary embolism, Hx of blood clots, Hyperlipidemia, Hypertension, Intracranial arachnoid cyst, Irritable bowel syndrome, Liver disease, MDRO (multiple drug resistant organisms) resistance, MRSA (methicillin resistant staph aureus) culture positive, Nephrolithiasis, Neuropathy, Pancreatic insufficiency, Positive SANDY (antinuclear antibody), Prolonged emergence from general anesthesia, Restless legs syndrome, Seizures ([facility] Sinus tachycardia, Skull fracture ([facility] Sleep apnea, Systolic dysfunction, and Type II or unspecified type diabetes mellitus without mention of complication, not stated as uncontrolled. Social History:   reports that he has never smoked. He has never used smokeless tobacco. He reports that he does not drink alcohol and does not use drugs.      Family History:   Family History   Problem Relation Age of Onset    Heart Disease Father 61        MI    Stroke Brother     Obesity Brother     Arthritis Mother     Seizures Mother     Obesity Sister     Other Brother         pulmonary embolism    Diabetes Daughter     Seizures Brother        Vitals:  BP (!) 144/75   Pulse 63   Temp 98.1 °F (36.7 °C) (Oral)   Resp 17   Wt 251 lb (113.9 kg)   SpO2 97%   BMI 32.23 kg/m²   Temp (24hrs), Av °F (36.7 °C), Min:97.6 °F (36.4 °C), Max:98.2 °F (36.8 °C)    Recent Labs     21  1037 21  1940 21  0731   POCGLU 157* 222* 189* 147* I/O (24Hr): Intake/Output Summary (Last 24 hours) at 7/28/2021 0811  Last data filed at 7/28/2021 0610  Gross per 24 hour   Intake --   Output 1250 ml   Net -1250 ml       Labs:  Hematology:  Recent Labs     07/27/21 0620 07/27/21 1811 07/28/21  0507   WBC 5.7 5.0 5.9   RBC 4.53* 4.48 4.50   HGB 13.8* 13.4 13.5   HCT 40.4* 38.5* 39.0*   MCV 89.2 85.9 86.7   MCH 30.5 29.9 30.0   MCHC 34.2 34.8 34.6   RDW  --  12.0 11.9    277 254   MPV 9.7 9.7 9.5     Chemistry:  Recent Labs     07/27/21 0620 07/27/21 1811 07/28/21  0507    138 142   K 3.8 3.8 3.7    106 108*   CO2 21* 20 20   GLUCOSE 185* 189* 160*   BUN 7 8 8   CREATININE 0.7 0.57* 0.63*   ANIONGAP 10.0 12 14   LABGLOM >90 >60 >60   GFRAA  --  >60 >60   CALCIUM 8.5 8.4* 8.5*     Recent Labs     07/26/21 2042 07/27/21  0617 07/27/21  1037 07/27/21  1811 07/27/21  1940 07/27/21  2132 07/28/21  0731   AMMONIA  --   --   --  109*  --   --   --    POCGLU 243* 181* 157*  --  222* 189* 147*     ABG:  Lab Results   Component Value Date    PH 7.40 06/26/2020    PH 5.5 01/04/2012    PCO2 40 06/26/2020    PO2 71 06/26/2020    HCO3 25 06/26/2020    O2SAT 94 06/26/2020     No results found for: SPECIAL  No results found for: CULTURE    Radiology:  MRI ANKLE LEFT W WO CONTRAST    Result Date: 7/21/2021   1. Increased signal intensity in the plantar soft tissues over the calcaneus which may represent a clinically described ulcer. No definite evidence of osteomyelitis in the underlying calcaneus. 2. Slightly increased fluid in the distal tibialis posterior tendon and along the peroneus brevis and longus tendon sheaths. 3. Increased signal intensity in the interosseous ligament between the talus and calcaneus which may be torn. 4. Abnormal signal intensity in the muscles along the medial aspect of the hindfoot. This may represent a tear of the abductor hallucis muscle belly.  5. Increased signal intensity in the flexor digitorum brevis muscle belly which may represent a partial tear. 6. Thickening at the attachment of the plantar fascia upon the calcaneus. 7. Small osteochondral defect in the medial talar dome. 8. Areas of abnormal signal intensity in the anterior process of the calcaneus, base of cuboid bone and tip of lateral malleolus suggestive of degenerative changes. 9. Increased signal intensity in the subcutaneous soft tissues over the medial and lateral aspects of the hindfoot consistent with edema and/or cellulitis. 10. Fluid collection along the talonavicular joint. **This report has been created using voice recognition software. It may contain minor errors which are inherent in voice recognition technology. ** Final report electronically signed by DR Liberty Rivas on 7/21/2021 3:25 PM      Physical Examination:        General appearance:  Uncomfortable appearing male, appears older than stated age. Responds to commands but appears somewhat somnolent. Wife is at bedside  Mental Status:  oriented to place  But not person or time. Normal affect  Lungs:  clear to auscultation bilaterally, normal effort  Heart:  regular rate and rhythm, no murmur  Abdomen:  soft, nontender, nondistended, normal bowel sounds, no masses, hepatomegaly, splenomegaly  Extremities:  no edema, redness, tenderness in the calves  Skin:  no gross lesions, rashes, induration    Assessment:        Hospital Problems         Last Modified POA    * (Principal) Seizures ([address] 7/27/2021 Yes    Hypertension 7/27/2021 Yes    Type 2 diabetes mellitus with diabetic neuropathy ([address] 7/27/2021 Yes    Neuropathy 7/27/2021 Yes    Bipolar affective disorder ([address] 7/27/2021 Yes    Partial symptomatic epilepsy with complex partial seizures, not intractable, without status epilepticus ([address] 7/27/2021 Yes    Gastroesophageal reflux disease 7/27/2021 Yes    Diabetic leg ulcer ([address] 7/27/2021 Yes          Plan:        1.  Breakthrough seizure hx of epilepsy: seizure threshold likely decreased 2/2 lower ext cellulitis. Continue Depakote, Keppra, Vimpat, Zonisamide. Discussed case with Neurology, will order LTME. Check MRI brain. 2. Cellulitis of the right lower leg: Continue IV abx today, per previous ID note will start bactrim on discharge. Will consult Infectious disease here regarding duration. - Packing to left heel changed with Mesalt ribbon, Betadine applied to incision, dressed with gauze 4x4's, kerlix, ACE  3. Diabetes mellitus type II with hyperglycemia: start home Lantus at lower dose due to decreased apatite and oral intake and bolus insulin. Monitor for signs of hypo/hyperglycemia. =  4. Essential Hypertension: continue home medications. Continue cardiac monitoring. 5. Depression: Continue Cymbalta  6. Obstructive sleep apnea  7. DVT ppx  8.  Cira Lundberg, DO  7/28/2021  8:11 AM

## 2021-07-28 NOTE — CONSULTS
[facility] Neurology   IN-PATIENT SERVICE      NEUROLOGY CONSULT  NOTE            Date:   7/28/2021  Patient name:  Mattie Krishnamurthy  Date of admission:  7/27/2021  YOB: 1970      Chief Complaint:     No chief complaint on file. Reason for Consult:      Seizures    History of Present Illness: The patient is a 46 y.o. male  With a past medical history of seizures after a left craniectomy for a left frontal subarachnoid cyst. The patient has a history of uncontrolled insulin dependant diabetes, neuropathy, hypertension, and several diabetic wounds on the lower limbs. The patient's symptoms began over a week ago when he began having more frequent seizures. These seizures also had new characteristics compared to his regular. He began having fecal incontinence and grossly longer postictal confusion and drowsiness. He has had 2 total GTC seizures in the past. He has staring spells preceded by an aura of facial numbness which occurs 2-3 times per week. He also frequently has autonomic seizures where his eyes roll back and he vomits. His mental status has slowly deteriorated over the last week. He has a wound on his left heel that required two I&D's and 2 wounds on his right leg complicated by cellulitis. He is currently taking vancomycin. His current seizure medications are keppra 2g bid, depakote 500 bid, zonisamide 100mg nightly, vimpat 200 mg bid. He has tried dilantin in the past but it was stopped. His ammonia was elevated to 109, keppra level was with limits, depakote level is 44, and 3.9 free. Other drug levels pending.         Past Medical History:     Past Medical History:   Diagnosis Date    Abnormal thyroid biopsy     s/p left hemithyroidectomy 2/7291 - follicular adenoma    Acid reflux     Anemia     resolved - previously seen by Dr. Maryjo Godinez (due to enlarged spleen)    Anesthesia     seizures with brain surgery due to blood sugar got really high    [facility] disorder ([facility]     Blood clot in vein 04/07/2016    [facility]     Cancer [facility]) 04/2019    thyroid    Chest pain     previously seeing [address] [place] [address] cardiologist, now seeing Dr. Maxine Mckay (LAD bridging on cath 4/2016)    Chronic back pain     Chronic bronchitis ([facility]     Dr. Kimberly Gonzalez Colon polyp 08/30/2016    Depression     seeing Yolande Westbrook DVT (deep venous thrombosis) ([facility] [phone]    not on Coumadin due to unable to regulate - Dr. Disha Duncan - no etiology found per patient    Esophageal abnormality     nodule     Fatty liver disease, nonalcoholic     U/S 85/2351 [facility]    Furuncle     legs    History of Doppler ultrasound 05/29/2011    No hemodynamically significant carotid stenosis is identified. A thyroid nodule on each side. Dedicated ultrasound of thyroid gland is suggested to further evaluate if clinically indicated.  History of pulmonary embolism 2007    s/p GFF, related to knee surgery    Hx of blood clots     leg and lung    Hyperlipidemia     severely elevated triglycerides    Hypertension     diastolic    Intracranial arachnoid cyst 11/2012    Dr. Adriana Daily drained, complicated with seizures, DKA    Irritable bowel syndrome     Liver disease     [facility] - elevated LFT - positive smooth muscle antibody, steatosis per liver bx 3/2014 with Dr. Jaswant Torres (multiple drug resistant organisms) resistance 2012    MRSA (methicillin resistant staph aureus) culture positive     h/o in foot and before brain surgery    Nephrolithiasis     noted on CT abdomen 9/2016, 6/2019    Neuropathy     Pancreatic insufficiency     Positive SANDY (antinuclear antibody)     Dr. Kory Piña - first visit in 6/2013    Prolonged emergence from general anesthesia     Restless legs syndrome     Seizures ([facility]     started with brain surgery    Sinus tachycardia     Holter 3/2013 - seeing Dr. Nataliia Dasilva fracture [facility])     clips     Sleep apnea     positive sleep apnea per study 10/2020.     Systolic dysfunction     \"systolic bridge\"  EF 60% ECHO 9/2019    Type II or unspecified type diabetes mellitus without mention of complication, not stated as uncontrolled 2007        Past Surgical History:     Past Surgical History:   Procedure Laterality Date    CARDIAC CATHETERIZATION      2011,5-6 years ago   [address]  3-2012    CARDIAC CATHETERIZATION  04/28/2016    [facility]     CARPAL TUNNEL RELEASE  01/2017    CHOLECYSTECTOMY  2004    COLONOSCOPY  2012    COLONOSCOPY  08/2016    2 tubular adenomas - reportedly needs repeat scope in 6 months    CRANIOTOMY  11/2012    arachnoid cyst drainage    EKG 12-LEAD  10/18/2015         ENDOSCOPY, COLON, DIAGNOSTIC      HERNIA REPAIR Right 1996    [facility]--Dr. Alis Gallego INGUINAL HERNIA REPAIR Right 09/15/2016    Robotic assisted    KNEE ARTHROSCOPY Right 2016    KNEE SURGERY Left 2007    acl and debrided twice    OTHER SURGICAL HISTORY  5-31-11    Tilt table was associated w/ nonspecific symptoms or nausea, otherwise no significant dizziness or syncope. No significant hemodynamic changes. Otherwise, unremarkable tilt table test after 30 minutes of tilting.  OTHER SURGICAL HISTORY  01/20/2017    RIGHT SHOULDER ARTHROSCOPY, OPEN STAN, OPEN ACROMIOPLASTY, BICEP TENDESIS, RIGHT CARPAL TUNNEL RELEASE    SHOULDER SURGERY Right 01/2007    THYROID LOBECTOMY N/A 1/15/2021    THYROID LOBECTOMY, UVULOPALATOPHARYNGOPLAST LAOP performed by Maryanne Ramos MD at [address] [facility] ECHOCARDIOGRAM  3-05-11    LV size and systolic function normal. EF 55-65%.      UPPER GASTROINTESTINAL ENDOSCOPY  2012    UPPER GASTROINTESTINAL ENDOSCOPY  2016    Dr. Sydney Carrera Left 10/22/2019    EGD DILATION SAVORY performed by Bowen Lund MD at [facility] ENDOSCOPY Left 10/22/2019    EGD BIOPSY performed by Bowen Lund MD at [address]  2007        Medications Prior to Admission: Prior to Admission medications    Medication Sig Start Date End Date Taking? Authorizing Provider   insulin lispro (HUMALOG) 100 UNIT/ML injection vial Takes 10 units with meals plus group 2 sliding scale    Historical Provider, MD   promethazine (PHENERGAN) 12.5 MG tablet Take 1-2 tablets by mouth every 8 hours as needed for Nausea 4/13/21   James Wild MD   blood glucose test strips (ONETOUCH ULTRA) strip 1 each by In Vitro route 3 times daily DX: E11.65 Dispense Onetouch Ultra 2 test strips 4/13/21   James Wild MD   pantoprazole (PROTONIX) 40 MG tablet Take 1 tablet by mouth 2 times daily 3/8/21   James Wild MD   vitamin D (ERGOCALCIFEROL) 1.25 MG (82817 UT) CAPS capsule Take 1 capsule by mouth once a week 3/8/21   James Wild MD   insulin lispro, 1 Unit Dial, ([facility]) 100 UNIT/ML SOPN Inject 10 Units into the skin 3 times daily Plus sliding scale 2 1/25/21 3/2/21  Beulah Funez MD   insulin glargine (LANTUS SOLOSTAR) 100 UNIT/ML injection pen Inject 75 Units into the skin 2 times daily    Historical Provider, MD   gabapentin (NEURONTIN) 600 MG tablet Take 600 mg by mouth 4 times daily. Historical Provider, MD   rOPINIRole (REQUIP) 1 MG tablet TAKE 1 TABLET BY MOUTH THREE TIMES A DAY 12/22/20   James Wild MD   metoprolol tartrate (LOPRESSOR) 50 MG tablet Take 1.5 tablets by mouth 2 times daily  Patient taking differently: Take 50 mg by mouth 2 times daily  8/17/20   James Wild MD   empagliflozin (JARDIANCE) 25 MG tablet Take 25 mg by mouth daily 9/18/19   James Wild MD   DULoxetine (CYMBALTA) 60 MG extended release capsule Take by mouth daily 2 tab    Historical Provider, MD   lacosamide (VIMPAT) 100 MG TABS tablet Take 200 mg by mouth 2 times daily.      Historical Provider, MD   sildenafil (VIAGRA) 100 MG tablet Take 1 tablet by mouth as needed for Erectile Dysfunction 1/29/19   James Wild MD   divalproex (DEPAKOTE) 500 MG DR tablet Take 500 mg by mouth 2 times daily  18   Historical Provider, MD   levETIRAcetam (KEPPRA) 500 MG tablet Take 2,000 mg by mouth 2 times daily     Historical Provider, MD   zonisamide (ZONEGRAN) 100 MG capsule Take by mouth nightly 5 capsules    Historical Provider, MD        Allergies:     Ciprofloxacin-ciproflox hcl er, Heparin, Metformin, Niacin and related, Tramadol hcl, Ultram [tramadol], and Warfarin    Social History:     Tobacco:    reports that he has never smoked. He has never used smokeless tobacco.  Alcohol:      reports no history of alcohol use. Drug Use:  reports no history of drug use. Family History:     Family History   Problem Relation Age of Onset    Heart Disease Father 61        MI    Stroke Brother     Obesity Brother     Arthritis Mother     Seizures Mother     Obesity Sister     Other Brother         pulmonary embolism    Diabetes Daughter     Seizures Brother        Review of Systems:       Constitutional Negative for fever and chills   HEENT Negative for ear discharge, ear pain, nosebleed   Eyes Negative for photophobia, pain and discharge   Respiratory Negative for hemoptysis and sputum   Cardiovascular Negative for orthopnea, claudication and PND   Gastrointestinal Negative for abdominal pain, diarrhea, blood in stool   Musculoskeletal Negative for joint pain, negative for myalgia   Skin Wound on left heel. 2 wounds on R. Leg and cellulitis   hematology Negative for ecchymosis, anemia   Psychiatric Negative for suicidal ideation, anxiety, depression, hallucinations       Physical Exam:   BP (!) 164/91   Pulse 58   Temp 98.4 °F (36.9 °C) (Oral)   Resp 15   Wt 251 lb (113.9 kg)   SpO2 97%   BMI 32.23 kg/m²   Temp (24hrs), Av.1 °F (36.7 °C), Min:97.9 °F (36.6 °C), Max:98.4 °F (36.9 °C)        General examination:      General Appearance:  Alert to person only, well appearing, and in no acute distress  HEENT: depression consistent with craniotomy.   Neck: supple, no carotid bruits, (-) nuchal rigidity  Lungs:  Respirations unlabored, chest wall no deformity, BS normal  Cardiovascular: normal rate, regular rhythm  Abdomen: Soft, nontender, nondistended, normal bowel sounds  Skin: Wound on left heel. 2 wounds on R. Leg and cellulitis  Extremities:  peripheral pulses palpable, no cyanosis, clubbing or edema  Psych: flat affect. Abulia, perserveration present      Neurological examination:      Mental status   Alert to self only; following all commands;   Abulia. Perserveration. Cranial nerves   II - visual fields intact to confrontation; pupils reactive  III, IV, VI - extraocular muscles intact; no KARIS; no nystagmus; no ptosis   V - normal facial sensation                                                               VII - normal facial symmetry                                                             VIII - intact hearing                                                                             IX, X - symmetrical palate elevation                                               XI - symmetrical shoulder shrug                                                       XII - midline tongue without atrophy or fasciculation     Motor function  Strength:   5/5 RUE, 5/5 RLE  5/5 LUE, 5/5  LLE  Normal bulk and tone. Sensory function Intact to touch, pin, vibration, proprioception throughout except    Decreased sensation to pinprick and vibration on bilateral lower limbs to knee. Cerebellar Intact finger-nose-finger testing. Intact heel-shin testing. No dysdiadochokinesia present. No tremors                        Reflex function 2/4 symmetric throughout . Downgoing plantar response bilaterally.  (-)Jones's sign bilaterally      Gait                  Not assessed           Diagnostics:      Laboratory Testing:  CBC:   Recent Labs     07/27/21  0620 07/27/21  1811 07/28/21  0507   WBC 5.7 5.0 5.9   HGB 13.8* 13.4 13.5    277 254     BMP:    Recent Labs ligament repair. No acute fracture is seen. Appears be a small bone cyst in the lateral malleolus. There is mild degenerative spurring of the distal femur. Note that there appear to be multiple superficial varicosities in the distal left calf     1. Degenerative changes of the left knee. Postsurgical changes left knee. Multiple varicosities this left calf. 2. Study otherwise unremarkable. No tibial or fibular fracture is seen. **This report has been created using voice recognition software. It may contain minor errors which are inherent in voice recognition technology. ** Final report electronically signed by Dr. Meghann Rice on 7/20/2021 9:07 PM    MRI ANKLE LEFT W WO CONTRAST    Result Date: 7/21/2021  PROCEDURE: MRI ANKLE LEFT W WO CONTRAST CLINICAL INFORMATION Wound, left heel, probes to bone, evaluate for abscess, osteomyelitis of left calcaneus COMPARISON: Plain radiographs dated 20 July 2021. CT scan of the left tib-fib dated 20 July 2021. . TECHNIQUE: MRI scan was carried out through the left ankle before and after intravenous administration of 20 mL of ProHance. Keith Mackey FINDINGS: 1. Tendons: There is slightly increased fluid in the distal tibialis posterior tendon and along the peroneus brevis and longus tendon sheaths. The  Achilles, flexor digitorum longus and flexor hallucis longus tendons appear to be intact 2. Ligaments: There is slight thickening of the posterior talofibular and deltoid ligaments. The anterior talofibular and calcaneofibular ligaments appear to be intact. There is increased signal intensity in the interosseous ligament between the talus and calcaneus which may be torn. 3. Articular structures. There is no significant tibiotalar effusion. There is small osteochondral defect in the medial talar dome. There is a fluid collection along the talonavicular articulation. There is degenerative change involving the calcaneocuboid articulation. 4. Bony structures. The calcaneus appears to be intact. There is no definite evidence of osteomyelitis. There is abnormal signal intensity involving the anterior process of the calcaneus, base of the cuboid bone and tip of the lateral malleolus suggestive of degenerative changes. 5. Soft tissues. There is abnormal signal intensity in the plantar soft tissues overlying the calcaneus which may represent the clinically described ulcer. There is abnormal signal intensity in the muscles along the medial aspect of the hindfoot this may  represent a partially torn abductor hallucis muscle belly. There is increased signal intensity in the skin and subcutaneous soft tissues consistent with edema and/or cellulitis. There is slight thickening at the attachment of the plantar fascia upon the  calcaneus. There is slightly increased signal intensity in the flexor digitorum brevis muscle belly which may represent a partial tear      1. Increased signal intensity in the plantar soft tissues over the calcaneus which may represent a clinically described ulcer. No definite evidence of osteomyelitis in the underlying calcaneus. 2. Slightly increased fluid in the distal tibialis posterior tendon and along the peroneus brevis and longus tendon sheaths. 3. Increased signal intensity in the interosseous ligament between the talus and calcaneus which may be torn. 4. Abnormal signal intensity in the muscles along the medial aspect of the hindfoot. This may represent a tear of the abductor hallucis muscle belly. 5. Increased signal intensity in the flexor digitorum brevis muscle belly which may represent a partial tear. 6. Thickening at the attachment of the plantar fascia upon the calcaneus. 7. Small osteochondral defect in the medial talar dome. 8. Areas of abnormal signal intensity in the anterior process of the calcaneus, base of cuboid bone and tip of lateral malleolus suggestive of degenerative changes.  9. Increased signal intensity in the subcutaneous soft tissues over the medial and lateral aspects of the hindfoot consistent with edema and/or cellulitis. 10. Fluid collection along the talonavicular joint. **This report has been created using voice recognition software. It may contain minor errors which are inherent in voice recognition technology. ** Final report electronically signed by DR Chriss Gustafson on 7/21/2021 3:25 PM          Impression:      70-year-old male presents as transfer from [facility] for altered mental status and uncontrolled seizures. He has cellulitis of the right leg with 2 wounds, and left heel wound. Currently on LTME        Plan:      1.) F/U LTME results for possible subclinical seizure activity   2.) F/U serum drug levels. 3.)  Continue current AEDs. Keppra 2000 mg twice daily, zonisamide 100 mg nightly, Depakote 500 mg twice daily, Vimpat 200 mg twice daily   4.)  Wound care and antibiotics as per primary.    5.)  Neurology will follow up        Electronically signed by Tegan Christianson MD on 7/28/2021 at 2:22 PM

## 2021-07-28 NOTE — PLAN OF CARE
Problem: Physical Regulation:  Goal: Ability to maintain vital signs within normal range will improve  Description: Ability to maintain vital signs within normal range will improve  Outcome: Met This Shift  Goal: Signs of adequate cerebral perfusion will increase  Description: Signs of adequate cerebral perfusion will increase  Outcome: Met This Shift     Problem: Coping:  Goal: Ability to identify appropriate support needs will improve  Description: Ability to identify appropriate support needs will improve  Outcome: Met This Shift     Problem: Safety:  Goal: Ability to remain free from injury will improve  Description: Ability to remain free from injury will improve  Outcome: Met This Shift     Problem: Self-Concept:  Goal: Level of anxiety will decrease  Description: Level of anxiety will decrease  Outcome: Met This Shift

## 2021-07-28 NOTE — PROGRESS NOTES
Physician Progress Note      Luis Daniel Gutierrez  CSN #:                  [id number]  :                       1970  ADMIT DATE:       2021 3:42 PM  [address] [place] DATE:  RESPONDING  PROVIDER #:        Viktoria Gan          QUERY TEXT:    Pt admitted with seizure. Pt noted to have noted in [address] Seizures started with   brain surgery as well as intracranial arachnoid cyst from  drained   ,complicated with seizures . If possible, please document in progress notes   and discharge summary if you are evaluating and/or treating any of the   following: The medical record reflects the following:  Risk Factors: DM, Anesthesia - seizures with brain surgery due to blood sugar   got really high, HX Crainiotomy - Intracranial arachnoid cyst drained, HX   Skull fracture  Clinical Indicators: Abnormal EEG on   in EPIC chart, Psychiatric:      Attention and Perception: He is inattentive. Mood and Affect: Affect is   flat. EEG ordered  Treatment: DEPAKOTE) DR tablet 500 mg ,lacosamide (VIMPAT) tablet 200 mg   ,levETIRAcetam (KEPPRA) tablet 2,000 mg  ,zonisamide (ZONEGRAN) capsule 500 mg   , MRI brain, repeat EEG      Thank you, Please call if questions  Maritza Sheehan RN CDS  [phone]  Options provided:  -- Seizure as a sequela of prior CVA  -- Seizure as a sequela of prior prior brain surgery  -- Seizure as a sequela of prior drained intracranial arachnoid cyst from   2012  -- Seizure due to brain neoplasm  -- Other - I will add my own diagnosis  -- Disagree - Not applicable / Not valid  -- Disagree - Clinically unable to determine / Unknown  -- Refer to Clinical Documentation Reviewer    PROVIDER RESPONSE TEXT:    Seizure is a sequela of prior CVA. Query created by: Caitie Nuñez on 2021 1:55 PM      Electronically signed by:   Viktoria Gna 2021 3:53 PM

## 2021-07-28 NOTE — PROGRESS NOTES
Pharmacy Vancomycin Consult     Vancomycin Day: 9   Current indication: SSTI of left heel and lower right leg    Recent Labs     07/27/21  1811 07/28/21  0507   BUN 8 8   CREATININE 0.57* 0.63*   WBC 5.0 5.9       Intake/Output Summary (Last 24 hours) at 7/28/2021 1039  Last data filed at 7/28/2021 0610  Gross per 24 hour   Intake --   Output 1250 ml   Net -1250 ml     Culture Date      Source                       Results  7/20                     wound, heel                MRSA    Ht Readings from Last 1 Encounters:   07/20/21 6' 2\" (1.88 m)        Wt Readings from Last 1 Encounters:   07/28/21 251 lb (113.9 kg)       Body mass index is 32.23 kg/m². CrCl cannot be calculated (Unknown ideal weight.).     Goal trough: 10-15     Level assessment:  - Vancomycin trough: 13.6  - Level drawn ~7.5 hours after last dose was given  - True trough ~12.8    Assessment/Plan:  - Scr and OU stable  - based on trough assessment will continue current regimen with Vancomycin 1250 mg q8h     Thank you,  Homar Zambrano, PharmD, 7/28/2021 10:54 AM

## 2021-07-28 NOTE — PLAN OF CARE
Problem: Nutritional:  Goal: Nutritional status will improve  Description: Nutritional status will improve  7/28/2021 1448 by Shahriar Larkin RN  Outcome: Ongoing     Problem: Physical Regulation:  Goal: Will remain free from infection  Description: Will remain free from infection  7/28/2021 1448 by Shahriar Larkin RN  Outcome: Ongoing     Problem: Physical Regulation:  Goal: Ability to maintain vital signs within normal range will improve  Description: Ability to maintain vital signs within normal range will improve  7/28/2021 1448 by Shahriar Larkin RN  Outcome: Met This Shift     Problem: Physical Regulation:  Goal: Signs of adequate cerebral perfusion will increase  Description: Signs of adequate cerebral perfusion will increase  7/28/2021 1448 by Shahriar Larkin RN  Outcome: Ongoing     Problem: Physical Regulation:  Goal: Ability to maintain a stable neurologic state will improve  Description: Ability to maintain a stable neurologic state will improve  7/28/2021 1448 by Shahriar Larkin RN  Outcome: Ongoing     Problem: Skin Integrity:  Goal: Demonstration of wound healing without infection will improve  Description: Demonstration of wound healing without infection will improve  7/28/2021 1448 by Shahriar Larkin RN  Outcome: Ongoing     Problem: Skin Integrity:  Goal: Will show no infection signs and symptoms  Description: Will show no infection signs and symptoms  Outcome: Ongoing     Problem: Skin Integrity:  Goal: Absence of new skin breakdown  Description: Absence of new skin breakdown  Outcome: Met This Shift     Problem: Coping:  Goal: Ability to cope will improve  Description: Ability to cope will improve  7/28/2021 1448 by Shahriar Larkin RN  Outcome: Ongoing     Problem: Coping:  Goal: Ability to identify appropriate support needs will improve  Description: Ability to identify appropriate support needs will improve  7/28/2021 1448 by Shahriar Larkin RN  Outcome: Ongoing     Problem: Health Behavior:  Goal: Ability to manage health-related needs will improve  Description: Ability to manage health-related needs will improve  7/28/2021 1448 by Valentino Kingdom, RN  Outcome: Ongoing     Problem: Safety:  Goal: Ability to remain free from injury will improve  Description: Ability to remain free from injury will improve  7/28/2021 1448 by Valentino Kingdom, RN  Outcome: Met This Shift     Problem: Self-Concept:  Goal: Level of anxiety will decrease  Description: Level of anxiety will decrease  7/28/2021 1448 by Valentino Kingdom, RN  Outcome: Ongoing     Problem: Self-Concept:  Goal: Ability to verbalize feelings about condition will improve  Description: Ability to verbalize feelings about condition will improve  7/28/2021 1448 by Valentino Kingdom, RN  Outcome: Ongoing

## 2021-07-28 NOTE — CARE COORDINATION
07/28/21 0850   Readmission Assessment   Number of Days since last admission?  1-7 days  (patient transferred from [facility]

## 2021-07-28 NOTE — PROGRESS NOTES
Physical Therapy    Facility/Department: [facility] 4A STEPDOWN  Initial Assessment    NAME: Herbert Woodall  : 1970  MRN: [id number]    Date of Service: 2021    Mr. Milady Queen is a 46year old male transferred to [facility] for neurology evaluation. Patient was admitted to [facility] one week ago for cellulitis of right lower leg. He was being treated with Vancomycin and Zosyn per wife and was doing well. On Friday she states that she called him and said he was slow to respond to her, she attributed this to narcotic pain medication he was receiving. She said when she called to check on him on Saturday his mentation was unchanged and the staff said he was not eating and was having episodes of staring. Neurology was consulted as he has a history of complex seizures and sees a neurologist in [place]. According to wife he then had an EEG on  and then again on Monday. She was told that both were abnormal and that he would need transferred for further neurology workup. She also states that he had a very similar episode in  when he went to the hospital for thyroid surgery. She states his hospitalization lasted 10 days and he eventually \"woke up\" and was back to baseline. She states that neurology has had a very difficult time adjusting his antiepileptics. Discharge Recommendations:  Patient would benefit from continued therapy after discharge   Assessment   Body structures, Functions, Activity limitations: Decreased functional mobility ; Decreased endurance;Decreased strength  Assessment: The pt stood with min assist but was unable to ambulate with NWB L LE.  He could bnefit from a continuation of PT for gait and strengthening following his DC  Prognosis: Good  Decision Making: Medium Complexity  PT Education: Goals;PT Role;Plan of Care  REQUIRES PT FOLLOW UP: Yes  Activity Tolerance  Activity Tolerance: Patient limited by fatigue;Patient limited by endurance       Patient Diagnosis(es): There were no encounter diagnoses. has a past medical history of Abnormal thyroid biopsy, Acid reflux, Anemia, Anesthesia, Bipolar disorder ([address] Blood clot in vein, Cancer ([address] Chest pain, Chronic back pain, Chronic bronchitis ([address] Colon polyp, Depression, DVT (deep venous thrombosis) ([address] Esophageal abnormality, Fatty liver disease, nonalcoholic, Furuncle, History of Doppler ultrasound, History of pulmonary embolism, Hx of blood clots, Hyperlipidemia, Hypertension, Intracranial arachnoid cyst, Irritable bowel syndrome, Liver disease, MDRO (multiple drug resistant organisms) resistance, MRSA (methicillin resistant staph aureus) culture positive, Nephrolithiasis, Neuropathy, Pancreatic insufficiency, Positive SANDY (antinuclear antibody), Prolonged emergence from general anesthesia, Restless legs syndrome, Seizures ([address] Sinus tachycardia, Skull fracture ([address] Sleep apnea, Systolic dysfunction, and Type II or unspecified type diabetes mellitus without mention of complication, not stated as uncontrolled. has a past surgical history that includes knee surgery (Left, 2007); Vena Cava Filter Placement (2007); hernia repair (Right, 1996); Cholecystectomy (2004); Upper gastrointestinal endoscopy (2012); transthoracic echocardiogram (3-05-11); other surgical history (5-31-11); Cardiac catheterization; Cardiac catheterization (3-2012); craniotomy (11/2012); Tonsillectomy; Endoscopy, colon, diagnostic; EKG 12 Lead (10/18/2015); Knee arthroscopy (Right, 2016); Cardiac catheterization (04/28/2016); Upper gastrointestinal endoscopy (2016); Inguinal hernia repair (Right, 09/15/2016); Colonoscopy (2012); Colonoscopy (08/2016); other surgical history (01/20/2017); shoulder surgery (Right, 01/2007); Carpal tunnel release (01/2017); Upper gastrointestinal endoscopy (Left, 10/22/2019);  Upper gastrointestinal endoscopy (Left, 10/22/2019); and Thyroid lobectomy (N/A, 1/15/2021). Restrictions  Restrictions/Precautions  Restrictions/Precautions: Weight Bearing  Lower Extremity Weight Bearing Restrictions  Right Lower Extremity Weight Bearing: Weight Bearing As Tolerated  Left Lower Extremity Weight Bearing: Non Weight Bearing  Vision/Hearing  Vision: Impaired  Vision Exceptions: Wears glasses at all times  Hearing: Within functional limits     Subjective  General  Patient assessed for rehabilitation services?: Yes  Response To Previous Treatment: Not applicable  Family / Caregiver Present: Yes  Follows Commands: Within Functional Limits  Subjective  Subjective: RN and pt agreeable to PT eval  Pain Screening  Patient Currently in Pain: Yes  Pain Assessment  Pain Assessment: 0-10  Pain Level: 9  Pain Location: Foot  Pain Orientation: Left  Vital Signs  Patient Currently in Pain: Yes     Orientation  Orientation  Overall Orientation Status: Within Functional Limits  Social/Functional History  Social/Functional History  Lives With: Spouse  Type of Home: House  Home Layout: One level  Home Access: Stairs to enter without rails  Entrance Stairs - Number of Steps: 1 step  Bathroom Shower/Tub: Tub/Shower unit  Bathroom Toilet: Standard  Home Equipment: [place], Rolling walker  Receives Help From: Family  ADL Assistance: Independent  Homemaking Assistance: Independent  Homemaking Responsibilities: Yes  Ambulation Assistance: Independent  Transfer Assistance: Independent  Active : No  Patient's  Info: patient unable to drive due to history of seizures.  Wife  or daughters drives  Occupation: On disability  Leisure & Hobbies: Fishing, time with grandchildren  Cognition      Objective        AROM RLE (degrees)  RLE AROM: WFL  AROM LLE (degrees)  LLE AROM : WFL  AROM RUE (degrees)  RUE AROM : WFL  AROM LUE (degrees)  LUE AROM : WFL  Strength RLE  Strength RLE: WFL  Strength LLE  Strength LLE: WFL  Strength RUE  Strength RUE: WFL  Strength LUE  Comment: N/T     Sensation  Overall Sensation Status: Impaired (Reports numbness i B hands and feet)  Bed mobility  Supine to Sit: Minimal assistance  Sit to Supine: Minimal assistance  Scooting: Minimal assistance  Transfers  Sit to Stand: Minimal Assistance  Stand to sit: Minimal Assistance  Ambulation  Ambulation?: No  Ambulation 1  Surface: level tile  Device: Rolling Walker  Assistance: Minimal assistance  Distance: sit to stand x min assist. The pt was unable to maintain NWB L LE.      Balance  Posture: Good  Sitting - Static: Good  Sitting - Dynamic: Fair  Standing - Static: Fair  Standing - Dynamic: [address]  Times per week: 5-6x wk  Current Treatment Recommendations: Strengthening, Functional Mobility Training, Transfer Training, Gait Training, Endurance Training, Pain Management, Safety Education & Training  Safety Devices  Type of devices: Nurse notified, Left in bed, Call light within reach    G-Code     OutComes Score     AM-PAC Score  AM-PAC Inpatient Mobility Raw Score : 16 (07/28/21 1230)  AM-PAC Inpatient T-Scale Score : 40.78 (07/28/21 1230)  Mobility Inpatient CMS 0-100% Score: 54.16 (07/28/21 1230)  Mobility Inpatient CMS G-Code Modifier : CK (07/28/21 1230)        Goals  Short term goals  Time Frame for Short term goals: 10  visits  Short term goal 1: transfers with SBA  Short term goal 2: amb 25 ft with a RW x CCGA with NWB L LE WBAT R LE  Short term goal 3: to be independent with bed mobility  Short term goal 4: 20 min exercise program x SBA  Patient Goals   Patient goals : Return home     Therapy Time   Individual Concurrent Group Co-treatment   Time In 6982         Time Out 0820         Minutes 25             1 of [facility],

## 2021-07-29 LAB
ABSOLUTE EOS #: 0.18 K/UL (ref 0–0.44)
ABSOLUTE IMMATURE GRANULOCYTE: 0.09 K/UL (ref 0–0.3)
ABSOLUTE LYMPH #: 1.58 K/UL (ref 1.1–3.7)
ABSOLUTE MONO #: 0.56 K/UL (ref 0.1–1.2)
ALBUMIN SERPL-MCNC: 3.6 G/DL (ref 3.5–5.2)
ALBUMIN/GLOBULIN RATIO: 1 (ref 1–2.5)
ALP BLD-CCNC: 156 U/L (ref 40–129)
ALT SERPL-CCNC: 46 U/L (ref 5–41)
ANION GAP SERPL CALCULATED.3IONS-SCNC: 14 MMOL/L (ref 9–17)
AST SERPL-CCNC: 22 U/L
BASOPHILS # BLD: 1 % (ref 0–2)
BASOPHILS ABSOLUTE: 0.04 K/UL (ref 0–0.2)
BILIRUB SERPL-MCNC: 0.61 MG/DL (ref 0.3–1.2)
BILIRUBIN DIRECT: 0.15 MG/DL
BILIRUBIN, INDIRECT: 0.46 MG/DL (ref 0–1)
BUN BLDV-MCNC: 6 MG/DL (ref 6–20)
BUN/CREAT BLD: ABNORMAL (ref 9–20)
CALCIUM SERPL-MCNC: 8.6 MG/DL (ref 8.6–10.4)
CHLORIDE BLD-SCNC: 106 MMOL/L (ref 98–107)
CO2: 18 MMOL/L (ref 20–31)
CREAT SERPL-MCNC: 0.62 MG/DL (ref 0.7–1.2)
DIFFERENTIAL TYPE: ABNORMAL
EOSINOPHILS RELATIVE PERCENT: 3 % (ref 1–4)
GFR AFRICAN AMERICAN: >60 ML/MIN
GFR NON-AFRICAN AMERICAN: >60 ML/MIN
GFR SERPL CREATININE-BSD FRML MDRD: ABNORMAL ML/MIN/{1.73_M2}
GFR SERPL CREATININE-BSD FRML MDRD: ABNORMAL ML/MIN/{1.73_M2}
GLOBULIN: ABNORMAL G/DL (ref 1.5–3.8)
GLUCOSE BLD-MCNC: 155 MG/DL (ref 75–110)
GLUCOSE BLD-MCNC: 157 MG/DL (ref 75–110)
GLUCOSE BLD-MCNC: 159 MG/DL (ref 75–110)
GLUCOSE BLD-MCNC: 179 MG/DL (ref 75–110)
GLUCOSE BLD-MCNC: 188 MG/DL (ref 75–110)
GLUCOSE BLD-MCNC: 191 MG/DL (ref 70–99)
HCT VFR BLD CALC: 40.1 % (ref 40.7–50.3)
HEMOGLOBIN: 13.9 G/DL (ref 13–17)
IMMATURE GRANULOCYTES: 2 %
LACOSAMIDE: 8.9 UG/ML (ref 5–10)
LYMPHOCYTES # BLD: 28 % (ref 24–43)
MAGNESIUM: 1.9 MG/DL (ref 1.6–2.6)
MCH RBC QN AUTO: 30 PG (ref 25.2–33.5)
MCHC RBC AUTO-ENTMCNC: 34.7 G/DL (ref 28.4–34.8)
MCV RBC AUTO: 86.4 FL (ref 82.6–102.9)
MONOCYTES # BLD: 10 % (ref 3–12)
NRBC AUTOMATED: 0 PER 100 WBC
PDW BLD-RTO: 12 % (ref 11.8–14.4)
PLATELET # BLD: 273 K/UL (ref 138–453)
PLATELET ESTIMATE: ABNORMAL
PMV BLD AUTO: 9.8 FL (ref 8.1–13.5)
POTASSIUM SERPL-SCNC: 3.8 MMOL/L (ref 3.7–5.3)
RBC # BLD: 4.64 M/UL (ref 4.21–5.77)
RBC # BLD: ABNORMAL 10*6/UL
SEG NEUTROPHILS: 56 % (ref 36–65)
SEGMENTED NEUTROPHILS ABSOLUTE COUNT: 3.13 K/UL (ref 1.5–8.1)
SODIUM BLD-SCNC: 138 MMOL/L (ref 135–144)
TOTAL PROTEIN: 7.1 G/DL (ref 6.4–8.3)
VALPROIC ACID % FREE: 10.5 % (ref 5–18.4)
VALPROIC ACID LEVEL: 66 UG/ML (ref 50–125)
VALPROIC ACID, FREE: 6.9 UG/ML (ref 7–23)
VALPROIC DATE LAST DOSE: ABNORMAL
VALPROIC DOSE AMOUNT: ABNORMAL
VALPROIC TIME LAST DOSE: ABNORMAL
WBC # BLD: 5.6 K/UL (ref 3.5–11.3)
WBC # BLD: ABNORMAL 10*3/UL
ZONISAMIDE: 13 UG/ML (ref 10–40)

## 2021-07-29 PROCEDURE — 80076 HEPATIC FUNCTION PANEL: CPT

## 2021-07-29 PROCEDURE — 99232 SBSQ HOSP IP/OBS MODERATE 35: CPT | Performed by: INTERNAL MEDICINE

## 2021-07-29 PROCEDURE — 80164 ASSAY DIPROPYLACETIC ACD TOT: CPT

## 2021-07-29 PROCEDURE — 83735 ASSAY OF MAGNESIUM: CPT

## 2021-07-29 PROCEDURE — 6370000000 HC RX 637 (ALT 250 FOR IP)

## 2021-07-29 PROCEDURE — 99233 SBSQ HOSP IP/OBS HIGH 50: CPT | Performed by: PSYCHIATRY & NEUROLOGY

## 2021-07-29 PROCEDURE — 36415 COLL VENOUS BLD VENIPUNCTURE: CPT

## 2021-07-29 PROCEDURE — 80048 BASIC METABOLIC PNL TOTAL CA: CPT

## 2021-07-29 PROCEDURE — 6370000000 HC RX 637 (ALT 250 FOR IP): Performed by: INTERNAL MEDICINE

## 2021-07-29 PROCEDURE — 2580000003 HC RX 258: Performed by: CLINICAL NURSE SPECIALIST

## 2021-07-29 PROCEDURE — 82947 ASSAY GLUCOSE BLOOD QUANT: CPT

## 2021-07-29 PROCEDURE — 6360000002 HC RX W HCPCS: Performed by: INTERNAL MEDICINE

## 2021-07-29 PROCEDURE — 97535 SELF CARE MNGMENT TRAINING: CPT

## 2021-07-29 PROCEDURE — 85025 COMPLETE CBC W/AUTO DIFF WBC: CPT

## 2021-07-29 PROCEDURE — 2580000003 HC RX 258: Performed by: INTERNAL MEDICINE

## 2021-07-29 PROCEDURE — 95720 EEG PHY/QHP EA INCR W/VEEG: CPT | Performed by: PSYCHIATRY & NEUROLOGY

## 2021-07-29 PROCEDURE — 80165 DIPROPYLACETIC ACID FREE: CPT

## 2021-07-29 PROCEDURE — 97110 THERAPEUTIC EXERCISES: CPT

## 2021-07-29 PROCEDURE — 97166 OT EVAL MOD COMPLEX 45 MIN: CPT

## 2021-07-29 PROCEDURE — 2060000000 HC ICU INTERMEDIATE R&B

## 2021-07-29 PROCEDURE — 6370000000 HC RX 637 (ALT 250 FOR IP): Performed by: CLINICAL NURSE SPECIALIST

## 2021-07-29 PROCEDURE — 6360000002 HC RX W HCPCS: Performed by: NURSE PRACTITIONER

## 2021-07-29 RX ORDER — LACTULOSE 10 G/15ML
20 SOLUTION ORAL 3 TIMES DAILY
Status: DISCONTINUED | OUTPATIENT
Start: 2021-07-29 | End: 2021-07-30

## 2021-07-29 RX ADMIN — INSULIN LISPRO 1 UNITS: 100 INJECTION, SOLUTION INTRAVENOUS; SUBCUTANEOUS at 20:56

## 2021-07-29 RX ADMIN — INSULIN LISPRO 2 UNITS: 100 INJECTION, SOLUTION INTRAVENOUS; SUBCUTANEOUS at 12:58

## 2021-07-29 RX ADMIN — DULOXETINE HYDROCHLORIDE 60 MG: 30 CAPSULE, DELAYED RELEASE ORAL at 09:14

## 2021-07-29 RX ADMIN — ZONISAMIDE 500 MG: 100 CAPSULE ORAL at 20:52

## 2021-07-29 RX ADMIN — ROPINIROLE HYDROCHLORIDE 1 MG: 1 TABLET, FILM COATED ORAL at 09:14

## 2021-07-29 RX ADMIN — ROPINIROLE HYDROCHLORIDE 1 MG: 1 TABLET, FILM COATED ORAL at 20:51

## 2021-07-29 RX ADMIN — LEVETIRACETAM 2000 MG: 500 TABLET, FILM COATED ORAL at 09:14

## 2021-07-29 RX ADMIN — INSULIN LISPRO 2 UNITS: 100 INJECTION, SOLUTION INTRAVENOUS; SUBCUTANEOUS at 09:14

## 2021-07-29 RX ADMIN — LEVETIRACETAM 2000 MG: 500 TABLET, FILM COATED ORAL at 20:52

## 2021-07-29 RX ADMIN — PANTOPRAZOLE SODIUM 40 MG: 40 TABLET, DELAYED RELEASE ORAL at 20:52

## 2021-07-29 RX ADMIN — Medication 1 CAPSULE: at 09:14

## 2021-07-29 RX ADMIN — DIVALPROEX SODIUM 750 MG: 500 TABLET, DELAYED RELEASE ORAL at 09:14

## 2021-07-29 RX ADMIN — Medication 1250 MG: at 20:55

## 2021-07-29 RX ADMIN — SODIUM CHLORIDE, PRESERVATIVE FREE 10 ML: 5 INJECTION INTRAVENOUS at 20:53

## 2021-07-29 RX ADMIN — LACTULOSE 20 G: 20 SOLUTION ORAL at 20:52

## 2021-07-29 RX ADMIN — METOPROLOL TARTRATE 75 MG: 25 TABLET ORAL at 09:13

## 2021-07-29 RX ADMIN — PANTOPRAZOLE SODIUM 40 MG: 40 TABLET, DELAYED RELEASE ORAL at 09:14

## 2021-07-29 RX ADMIN — SODIUM CHLORIDE, PRESERVATIVE FREE 10 ML: 5 INJECTION INTRAVENOUS at 09:19

## 2021-07-29 RX ADMIN — Medication 1250 MG: at 13:08

## 2021-07-29 RX ADMIN — LACOSAMIDE 200 MG: 100 TABLET, FILM COATED ORAL at 20:52

## 2021-07-29 RX ADMIN — INSULIN LISPRO 2 UNITS: 100 INJECTION, SOLUTION INTRAVENOUS; SUBCUTANEOUS at 17:05

## 2021-07-29 RX ADMIN — LACOSAMIDE 200 MG: 100 TABLET, FILM COATED ORAL at 09:14

## 2021-07-29 RX ADMIN — DIVALPROEX SODIUM 750 MG: 500 TABLET, DELAYED RELEASE ORAL at 20:52

## 2021-07-29 RX ADMIN — ENOXAPARIN SODIUM 40 MG: 40 INJECTION SUBCUTANEOUS at 09:13

## 2021-07-29 RX ADMIN — METOPROLOL TARTRATE 75 MG: 25 TABLET ORAL at 20:51

## 2021-07-29 RX ADMIN — INSULIN GLARGINE 45 UNITS: 100 INJECTION, SOLUTION SUBCUTANEOUS at 09:14

## 2021-07-29 RX ADMIN — ROPINIROLE HYDROCHLORIDE 1 MG: 1 TABLET, FILM COATED ORAL at 12:58

## 2021-07-29 ASSESSMENT — PAIN DESCRIPTION - ORIENTATION
ORIENTATION: LEFT
ORIENTATION: LEFT

## 2021-07-29 ASSESSMENT — ENCOUNTER SYMPTOMS
ABDOMINAL DISTENTION: 0
CHOKING: 0
WHEEZING: 0
CHEST TIGHTNESS: 0
DIARRHEA: 0
SHORTNESS OF BREATH: 0
EYE PAIN: 0
VOMITING: 0
COUGH: 0
BACK PAIN: 0
CONSTIPATION: 0
APNEA: 0
TROUBLE SWALLOWING: 0
PHOTOPHOBIA: 0
ABDOMINAL PAIN: 0
NAUSEA: 0

## 2021-07-29 ASSESSMENT — PAIN SCALES - GENERAL
PAINLEVEL_OUTOF10: 10
PAINLEVEL_OUTOF10: 0
PAINLEVEL_OUTOF10: 0

## 2021-07-29 ASSESSMENT — PAIN DESCRIPTION - PAIN TYPE
TYPE: ACUTE PAIN
TYPE: ACUTE PAIN

## 2021-07-29 ASSESSMENT — PAIN DESCRIPTION - PROGRESSION: CLINICAL_PROGRESSION: GRADUALLY WORSENING

## 2021-07-29 ASSESSMENT — PAIN DESCRIPTION - LOCATION
LOCATION: FOOT
LOCATION: FOOT

## 2021-07-29 NOTE — PLAN OF CARE
Ability to cope will improve  Description: Ability to cope will improve  7/29/2021 0919 by Carmella Díaz RN  Outcome: Ongoing  7/29/2021 0251 by Angel Luis Ladd RN  Outcome: Ongoing  Goal: Ability to identify appropriate support needs will improve  Description: Ability to identify appropriate support needs will improve  7/29/2021 0919 by Carmella Díaz RN  Outcome: Ongoing  7/29/2021 0251 by Angel Luis Ladd RN  Outcome: Ongoing     Problem: Health Behavior:  Goal: Ability to manage health-related needs will improve  Description: Ability to manage health-related needs will improve  7/29/2021 0919 by Carmella Díaz RN  Outcome: Ongoing  7/29/2021 0251 by Angel Luis Ladd RN  Outcome: Ongoing     Problem: Safety:  Goal: Ability to remain free from injury will improve  Description: Ability to remain free from injury will improve  7/29/2021 0919 by Carmella Díaz RN  Outcome: Ongoing  7/29/2021 0251 by Angel Luis Ladd RN  Outcome: Ongoing     Problem: Self-Concept:  Goal: Level of anxiety will decrease  Description: Level of anxiety will decrease  7/29/2021 0919 by Carmella Díaz RN  Outcome: Ongoing  7/29/2021 0251 by Angel Luis Ladd RN  Outcome: Ongoing  Goal: Ability to verbalize feelings about condition will improve  Description: Ability to verbalize feelings about condition will improve  7/29/2021 0919 by Carmella Díaz RN  Outcome: Ongoing  7/29/2021 0251 by Angel Luis Ladd RN  Outcome: Ongoing     Problem: Falls - Risk of:  Goal: Will remain free from falls  Description: Will remain free from falls  7/29/2021 0919 by Carmella Díaz RN  Outcome: Ongoing  7/29/2021 0251 by Angel Luis Ladd RN  Outcome: Ongoing  Goal: Absence of physical injury  Description: Absence of physical injury  7/29/2021 0919 by Carmella Díaz RN  Outcome: Ongoing  7/29/2021 0251 by Angel Luis Ladd RN  Outcome: Ongoing     Problem: Musculor/Skeletal Functional Status  Goal: Highest potential functional level  7/29/2021 0919 by Gerhardt Credit, RN  Outcome: Ongoing  7/29/2021 0251 by Bre Hong RN  Outcome: Ongoing     Problem: Pain:  Goal: Pain level will decrease  Description: Pain level will decrease  7/29/2021 0919 by Gerhardt Credit, RN  Outcome: Ongoing  7/29/2021 0251 by Bre Hong RN  Outcome: Ongoing  Goal: Control of acute pain  Description: Control of acute pain  7/29/2021 0919 by Gerhardt Credit, RN  Outcome: Ongoing  7/29/2021 0251 by Bre Hong RN  Outcome: Ongoing  Goal: Control of chronic pain  Description: Control of chronic pain  7/29/2021 0919 by Gerhardt Credit, RN  Outcome: Ongoing  7/29/2021 0251 by Bre Hong RN  Outcome: Ongoing

## 2021-07-29 NOTE — PROGRESS NOTES
Occupational Therapy   Occupational Therapy Initial Assessment  Date: 2021   Patient Name: Odessa Delacruz  MRN: [id number]     : 1970    Date of Service: 2021     Copied from chart:    The patient is a 46 y.o. male with significant past medical history of DM, Seizure disorder, Anemia, Hyperlipidemia, systolic dysfunction who is seen at bedside on behalf of Dr. Hugo Savage. Patient states he began having extreme 10/10 pain to his left heel and ankle 3 days ago. The pain is so severe the patient states he cannot bear weight on the left foot. Patient has been ambulating using a walker. Patient denies trauma to the area. 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 Patient was referred to the [facility] ED by his family doctor. Patient does admit to nausea and diarrhea, but states that is normal for him. He denies Fever, chills, chest pain, or other constitutional symptoms at this time. Discharge Recommendations:  Patient would benefit from continued therapy after discharge. Pt is not safe to return home to prior living arrangements without assist in order to safely engage in ADLs, IADLs, and func mob. OT Equipment Recommendations  Equipment Needed: Yes  Mobility Devices: ADL Assistive Devices  ADL Assistive Devices: Grab Bars - shower; Toileting - Drop Arm Commode, Heavy Duty Drop Arm Commode;Transfer Tub Bench;Reacher    Assessment   Performance deficits / Impairments: Decreased functional mobility ; Decreased endurance;Decreased ADL status; Decreased balance;Decreased safe awareness;Decreased high-level IADLs;Decreased cognition  Assessment: Pt completed bed mob with CGA and sat EOB to complete self care tasks with Jarrett. Pt donned R sock EOB with CGA.  Pt demo'd 2 sit<>stand transfers with RW, the first with CGA but immediately terminated d/t pt unable to maintain WB status, the second requiring MaxA to support L leg but pt still unable to maintain WB status. Pt would benefit from continued skilled OT services during their acute hospitalization stay to address the above noted deficits in order to improve independence in ADLs, IADLs, and func mob. Prognosis: Good  Decision Making: Medium Complexity  OT Education: OT Role;Orientation;Plan of Care;Precautions;Transfer Training  Patient Education: Pt educated on WB precautions- poor return  Barriers to Learning: cognition  REQUIRES OT FOLLOW UP: Yes  Activity Tolerance  Activity Tolerance: Patient limited by pain;Treatment limited secondary to decreased cognition  Activity Tolerance: Limited by ability to follow WB status  Safety Devices  Safety Devices in place: Yes  Type of devices: Left in bed;Bed alarm in place;Call light within reach;Nurse notified;Gait belt;Patient at risk for falls  Restraints  Initially in place: No           Patient Diagnosis(es): There were no encounter diagnoses.      has a past medical history of Abnormal thyroid biopsy, Acid reflux, Anemia, Anesthesia, Bipolar disorder ([address] Blood clot in vein, Cancer ([address] Chest pain, Chronic back pain, Chronic bronchitis ([address] Colon polyp, Depression, DVT (deep venous thrombosis) ([address] Esophageal abnormality, Fatty liver disease, nonalcoholic, Furuncle, History of Doppler ultrasound, History of pulmonary embolism, Hx of blood clots, Hyperlipidemia, Hypertension, Intracranial arachnoid cyst, Irritable bowel syndrome, Liver disease, MDRO (multiple drug resistant organisms) resistance, MRSA (methicillin resistant staph aureus) culture positive, Nephrolithiasis, Neuropathy, Pancreatic insufficiency, Positive SANDY (antinuclear antibody), Prolonged emergence from general anesthesia, Restless legs syndrome, Seizures ([address] Sinus tachycardia, Skull fracture ([address] Sleep apnea, Systolic dysfunction, and Type II or unspecified type diabetes mellitus without mention of complication, not stated Stairs - Number of Steps: 1 step  Bathroom Shower/Tub: Tub/Shower unit  Bathroom Toilet: Standard  Bathroom Equipment:  (no AD)  Home Equipment: Cane, Rolling walker (PRN)  Receives Help From: Family  ADL Assistance: Independent  Homemaking Assistance: Independent  Homemaking Responsibilities: Yes (Shared with wife)  Ambulation Assistance: Independent  Transfer Assistance: Independent  Active : No  Patient's  Info: Pt occasionally drives on good days but usually patient unable to drive due to history of seizures. Wife or daughters drives  Occupation: On disability  Leisure & Hobbies: Fishing, time with grandchildren  Additional Comments: Wife works during the day and daughter works night, but usually someone is around to assist PRN       Objective   Vision: Impaired  Vision Exceptions: Wears glasses for distance  Hearing: Within functional limits    Orientation  Overall Orientation Status: Impaired  Orientation Level: Oriented to person;Disoriented to place; Disoriented to time;Oriented to situation (Pt stated that the currect month is June)     Balance  Sitting Balance: Contact guard assistance (Pt sat EOB for ~15 minutes to complete self care tasks)  Standing Balance: Maximum assistance  Standing Balance  Time: ~1 minute  Activity: Standing EOB  Comment: Pt completed 2 trials with RW and CGA to maintain balance. 1st trial, Pt unable to follow NWB L leg, writer instructing pt to sit back down. 2nd trial, MaxA to hold leg off ground to maintain NWB L foot but pt resisting writer's assistance, attempting to place foot on ground, terminating standing trial.  Functional Mobility  Functional Mobility Comments: Pt unable to stand and follow NWB status on L leg  ADL  Feeding: Modified independent ;Setup; Increased time to complete;Verbal cueing  Grooming: Minimal assistance;Verbal cueing;Setup; Increased time to complete (Pt sat unsupported EOB to complete oral hygiene, pt required Jarrett to put toothpaste on toothbrush. Pt required VCs throughout for initation and sequencing of task. Pt washed face with wet washcloth)  UE Bathing: Minimal assistance; Increased time to complete;Verbal cueing;Setup  LE Bathing: Moderate assistance;Setup;Verbal cueing; Increased time to complete  UE Dressing: Minimal assistance; Increased time to complete;Verbal cueing;Setup  LE Dressing: Moderate assistance; Increased time to complete;Verbal cueing;Setup (Pt sat EOB and donned R sock with CGA; Pt would required ModA to don pants)  Toileting: Maximum assistance;Setup; Increased time to complete  Tone RUE  RUE Tone: Normotonic  Tone LUE  LUE Tone: Normotonic  Coordination  Movements Are Fluid And Coordinated: No  Coordination and Movement description: Fine motor impairments;Decreased speed;Decreased accuracy; Right UE;Left UE  Quality of Movement Other  Comment: Pt unable to complete serial opposition of 4th and 5th digits, limited by cognition     Bed mobility  Supine to Sit: Contact guard assistance  Sit to Supine: Contact guard assistance  Scooting: Contact guard assistance  Comment: Pt able to progress LLEs and trunk to upright position with CGA d/t impulsivity and pt having difficulty following commands  Transfers  Sit to stand: Maximum assistance (1st trial, CGA but pt immediately unable to maintain WB status; 2nd trial, Jarrett d/t pt's leg being supported ensure no weightbearing requiring MaxA for LLE support)  Stand to sit: Minimal assistance (Jarrett to initiate sitting and guide hips to safe position EOB)     Cognition  Overall Cognitive Status: Exceptions  Following Commands: Follows one step commands with increased time; Follows one step commands with repetition  Attention Span: Difficulty attending to directions  Memory: Decreased recall of precautions  Safety Judgement: Decreased awareness of need for assistance;Decreased awareness of need for safety  Problem Solving: Assistance required to identify errors made;Assistance required to correct errors made;Decreased awareness of errors  Insights: Decreased awareness of deficits  Initiation: Requires cues for some  Sequencing: Requires cues for some              LUE AROM (degrees)  LUE AROM : WFL  Left Hand AROM (degrees)  Left Hand AROM: WFL  RUE AROM (degrees)  RUE AROM : WFL  Right Hand AROM (degrees)  Right Hand AROM: WFL  LUE Strength  L Shoulder Flex: 4+/5  L Elbow Flex: 4+/5  L Elbow Ext: 4+/5  L Hand General: 5/5  RUE Strength  R Shoulder Flex: 4+/5  R Elbow Flex: 4+/5  R Elbow Ext: 4+/5  R Hand General: 5/5              Plan   Plan  Times per week: 3-5x/wk  Current Treatment Recommendations: Patient/Caregiver Education & Training, Balance Training, Functional Mobility Training, Cognitive Reorientation, Endurance Training, Pain Management, Cognitive/Perceptual Training, Safety Education & Training, Self-Care / ADL      AM-PAC Score        AM-PAC Inpatient Daily Activity Raw Score: 16 (07/29/21 1131)  AM-PAC Inpatient ADL T-Scale Score : 35.96 (07/29/21 1131)  ADL Inpatient CMS 0-100% Score: 53.32 (07/29/21 1131)  ADL Inpatient CMS G-Code Modifier : CK (07/29/21 1131)    Goals  Short term goals  Time Frame for Short term goals: By discharge, pt will be able to:  Short term goal 1: Demo bed mobility with SBA and use of handrails  Short term goal 2: Demo func transfers with Jarrett and following all WB precautions with less than 2 VCs  Short term goal 3: Demo static standing balance for 5+ minutes with Jarrett and LRD PRN, following all WB precautions, and 1 VC, to increase activity tolerance for functional tasks  Short term goal 4: Demo UB ADLs with SBA and 0 VCs  Short term goal 5: Demo LB ADLs with CGA, AE/DME PRN, and 0 VCs to follow WB precautions   Short term goal 6: Demo 100% accuracy to NWB precautions throughout all func tasks with less than 4 VCs        Therapy Time   Individual Concurrent Group Co-treatment   Time In 0857         Time Out 0929         Minutes 32         Timed Code Treatment Minutes: 10 [facility], S/OT

## 2021-07-29 NOTE — PROGRESS NOTES
[facility]  Office: [address], DO, [facility] Therese, DO, Keon Woodruff, DO, Margauxcarla Alfredo, DO, Mago Herring MD, Lena Solomon MD, Lea Ortega MD, Ac Blackmon MD, Edmond Pavon MD, Fidel Page MD, Lebron Valdovinos MD, Marilou Newby, DO, Tess Ma MD, Spring Guerra, DO, Siva Velarde MD,  Vidhi Doe, DO, Deena Alexis MD, Jude Hanson MD, Geeta Jaramillo MD, Marcellus Rowell MD, Griselda Dupree MD, Celestine Caballero MD, Jacob Olivares, [facility], [facility], CNP, Octavio Zacarias, CNP, Deedee Epstein, CNS, Jose Fan, CNP, Aditya Ontiveros, CNP, Randall Smith, CNP, Preethi Talavera, CNP, Mary Ordaz, CNP, Tiffany Will PA-C, Courtney Hall, [facility], Yuniel Deng, CNP, Freddy Preciado, CNP, Rusty Farah, CNP, Maryam Schmitz, CNP, Rashawn Covarrubias, CNP, Juani Mills, CNP, Brenna Benz, CNP, May Ortega, [address] [place] [address]    Progress Note    7/29/2021    8:51 AM    Name:   Sofya Headley  MRN:     [id number]     Acct:      [de-identified]   Room:   [address] [place] [address] Day:  2  Admit Date:  7/27/2021  3:42 PM    PCP:   James Wild MD  Code Status:  Full Code    Subjective:     C/C: seizure  Interval History Status: not changed    Patient seen and examined with wife at bedside. Patient is more awake today and able to follow commands. However he is still only oriented to person. He denies any chest pain, shortness of breath, difficulty breathing, nausea, vomiting, diarrhea. He is able to follow commands. Labs, vital signs have been stable. Patient is on LTME. EEG reports did show findings consistent with PLEDS. Brief History: This is a 45-year-old male who was transferred here from [facility] for LTM E. He initially presented to the hospital for treatment of right lower extremity cellulitis.   He was evaluated by podiatry and started on vancomycin and Zosyn was transition to Vancomycin after cultures positive for MRSA. It was recommended he be discharged from OSH with bactrim. Review of Systems:     Constitutional:  negative for chills, fevers, sweats  Respiratory:  negative for cough, dyspnea on exertion, shortness of breath, wheezing  Cardiovascular:  negative for chest pain, chest pressure/discomfort, lower extremity edema, palpitations  Gastrointestinal:  negative for abdominal pain, constipation, diarrhea, nausea, vomiting  Neurological:  negative for dizziness, headache    Medications: Allergies:     Allergies   Allergen Reactions    Ciprofloxacin-Ciproflox Hcl Er Other (See Comments)     Elevated creatinine    Heparin      Monitor for thrombocytopenia    Metformin Nausea Only     Abdominal pain, diarrhea    Niacin And Related Other (See Comments)     Burning sensation, severe flushing    Tramadol Hcl      Contraindication to seizure medications    Ultram [Tramadol]      Contraindication to seizure medications    Warfarin      Very difficult to regulate in past       Current Meds:   Scheduled Meds:    insulin glargine  45 Units Subcutaneous BID    vancomycin (VANCOCIN) intermittent dosing (placeholder)   Other RX Placeholder    divalproex  750 mg Oral BID    zonisamide  500 mg Oral Nightly    DULoxetine  60 mg Oral Daily    pantoprazole  40 mg Oral BID    levETIRAcetam  2,000 mg Oral BID    metoprolol tartrate  75 mg Oral BID    rOPINIRole  1 mg Oral TID    insulin lispro  0-12 Units Subcutaneous TID WC    insulin lispro  0-6 Units Subcutaneous Nightly    lacosamide  200 mg Oral BID    lactobacillus  1 capsule Oral Daily    sodium chloride flush  5-40 mL Intravenous 2 times per day    vancomycin  1,250 mg Intravenous Q8H    enoxaparin  40 mg Subcutaneous Daily     Continuous Infusions:    dextrose      sodium chloride       PRN Meds: glucose, dextrose, glucagon (rDNA), dextrose, sodium chloride flush, sodium chloride, potassium chloride **OR** potassium alternative oral replacement **OR** potassium chloride, magnesium sulfate, ondansetron **OR** ondansetron, polyethylene glycol, acetaminophen **OR** acetaminophen    Data:     Past Medical History:   has a past medical history of Abnormal thyroid biopsy, Acid reflux, Anemia, Anesthesia, Bipolar disorder ([facility] [address] Blood clot in vein, Cancer ([facility] [address] Chest pain, Chronic back pain, Chronic bronchitis ([facility] [address] Colon polyp, Depression, DVT (deep venous thrombosis) ([facility] [address] Esophageal abnormality, Fatty liver disease, nonalcoholic, Furuncle, History of Doppler ultrasound, History of pulmonary embolism, Hx of blood clots, Hyperlipidemia, Hypertension, Intracranial arachnoid cyst, Irritable bowel syndrome, Liver disease, MDRO (multiple drug resistant organisms) resistance, MRSA (methicillin resistant staph aureus) culture positive, Nephrolithiasis, Neuropathy, Pancreatic insufficiency, Positive SANDY (antinuclear antibody), Prolonged emergence from general anesthesia, Restless legs syndrome, Seizures ([facility] [address] Sinus tachycardia, Skull fracture ([facility] [address] [facility] Sleep apnea, Systolic dysfunction, and Type II or unspecified type diabetes mellitus without mention of complication, not stated as uncontrolled. Social History:   reports that he has never smoked. He has never used smokeless tobacco. He reports that he does not drink alcohol and does not use drugs.      Family History:   Family History   Problem Relation Age of Onset    Heart Disease Father 61        MI    Stroke Brother     Obesity Brother     Arthritis Mother     Seizures Mother     Obesity Sister     Other Brother         pulmonary embolism    Diabetes Daughter     Seizures Brother        Vitals:  BP (!) 158/98   Pulse 58   Temp (P) 98 °F (36.7 °C) (Oral)   Resp 13   Wt 254 lb 3.2 oz (115.3 kg)   SpO2 96%   BMI 32.64 kg/m²   Temp (24hrs), Av °F (36.7 °C), Min:97.5 °F (36.4 °C), Max:98.4 °F (36.9 °C)    Recent Labs     21  1148 21  1636 21  2058 21  8198 over the calcaneus which may represent a clinically described ulcer. No definite evidence of osteomyelitis in the underlying calcaneus. 2. Slightly increased fluid in the distal tibialis posterior tendon and along the peroneus brevis and longus tendon sheaths. 3. Increased signal intensity in the interosseous ligament between the talus and calcaneus which may be torn. 4. Abnormal signal intensity in the muscles along the medial aspect of the hindfoot. This may represent a tear of the abductor hallucis muscle belly. 5. Increased signal intensity in the flexor digitorum brevis muscle belly which may represent a partial tear. 6. Thickening at the attachment of the plantar fascia upon the calcaneus. 7. Small osteochondral defect in the medial talar dome. 8. Areas of abnormal signal intensity in the anterior process of the calcaneus, base of cuboid bone and tip of lateral malleolus suggestive of degenerative changes. 9. Increased signal intensity in the subcutaneous soft tissues over the medial and lateral aspects of the hindfoot consistent with edema and/or cellulitis. 10. Fluid collection along the talonavicular joint. **This report has been created using voice recognition software. It may contain minor errors which are inherent in voice recognition technology. ** Final report electronically signed by DR Chriss Gustafson on 7/21/2021 3:25 PM      Physical Examination:        General appearance: comfortable appearing male, appears older than stated age. Responds to commands but is inconsistent. . Wife is at bedside  Mental Status:  oriented to place  But not person or time. Normal affect. He is able to follow commands. Lungs:  clear to auscultation bilaterally, normal effort  Heart:  regular rate and rhythm, no murmur  Abdomen:  soft, nontender, nondistended, normal bowel sounds, no masses, hepatomegaly, splenomegaly  Extremities:  no edema, redness, tenderness in the calves.   Right leg ulcerations, has history of MRSA.  Skin:  no gross lesions, rashes, induration    Assessment:        Hospital Problems         Last Modified POA    * (Principal) Seizures ([facility] [address] 7/27/2021 Yes    Hypertension 7/27/2021 Yes    Type 2 diabetes mellitus with diabetic neuropathy ([address] 7/27/2021 Yes    Neuropathy 7/27/2021 Yes    Bipolar affective disorder ([address] 7/27/2021 Yes    Partial symptomatic epilepsy with complex partial seizures, not intractable, without status epilepticus ([address] 7/27/2021 Yes    Gastroesophageal reflux disease 7/27/2021 Yes    Diabetic leg ulcer ([address] 7/27/2021 Yes    Partial symptomatic epilepsy with complex partial seizures, intractable, without status epilepticus ([facility] [address] 7/28/2021 Yes    Episodic confusion 7/28/2021 Yes          Plan:        1. Breakthrough seizure hx of epilepsy: seizure threshold likely decreased 2/2 lower ext cellulitis. Continue Depakote, Keppra, Vimpat, Zonisamide. He is currently on LTM E.  EEG shows findings consistent with PLEDS. Neurology is following  2. Cellulitis of the right lower leg: Continue IV abx today, per previous ID note will start bactrim on discharge. Packing to left heel changed with Mesalt ribbon, Betadine applied to incision, dressed with gauze 4x4's, kerlix, ACE  3. Diabetes mellitus type II with hyperglycemia: continue home Lantus at lower dose due to decreased apatite and oral intake and bolus insulin. Monitor for signs of hypo/hyperglycemia. =  4. Essential Hypertension: continue home medications. Continue cardiac monitoring. 5. Depression: Continue Cymbalta  6. Obstructive sleep apnea  7. DVT ppx  8.  [address], DO  7/29/2021  8:51 AM

## 2021-07-29 NOTE — CARE COORDINATION
Transitional Planning    Met with patient and wife to discuss plan. Explained the options of SNF and ARU and the wife states the goal is for him to improve quickly so he can return home but they are agreeable to also pursuing [facility] as a back up plan. MD Jerman Machuca for PM&R consult and [facility] referral made.

## 2021-07-29 NOTE — PROGRESS NOTES
[facility] - Progress Note  Today's Date and Time: 7/29/2021, 8:10 AM    Impression :   · MRSA right leg ulcerations  · Left heel wound  · Breakthrough seizure with history of epilepsy  · Diabetes mellitus type 2  · Essential hypertension  · Hyperammonemia  · Altered mentation    Recommendations:   · Vancomycin may be continued for now as patient may not be safe to take oral medication at this time. Monitor renal function closely  · Podiatry consult  · Wound care  · Speech evaluation with swallow study  · GI consult for hyperammonemia    Medical Decision Making/Summary/Discussion:7/29/2021     ·   Infection Control Recommendations   · Thompson Precautions  · Contact Isolation     Antimicrobial Stewardship Recommendations     · Simplification of therapy  · Targeted therapy    Coordination of Outpatient Care:   · Estimated Length of IV antimicrobials: TBD  · Patient will need Midline Catheter Insertion: No  · Patient will need PICC line Insertion:  · Patient will need: Home IV , [place],  SNF,  LTAC:TBD  · Patient will need outpatient wound care:Yes    Chief complaint/reason for consultation:   · Cellulitis, foot abscess growing MRSA      History of Present Illness:   Alisha Sexton is a 46y.o.-year-old male who was initially admitted on 7/27/2021. Patient seen at the request of Dr. Eric Barakat:    Initially, this patient with a history of complex seizure disorder and diabetes mellitus, presented to [facility] on 7/20/2021 with 2 ulcerations on his right leg and one on his left heel. He states they had first appeared approximately 3 weeks prior, but have gotten worse with foul-smelling discharge and severe pain. Wound cultures of the right leg yielded MRSA.     While at [facility], the patient underwent an I&D of the left heel wound per Podiatry and was treated for the MRSA positive right leg wounds with Vanco  per ID physician,  Dipesh. One out of 2 blood cultures was positive for coag negative staph but this was considered as a contaminant. The patient was doing well, but experienced a change in mentation and an EEG showed abnormal changes. He was going to be transferred to [place], where he follows up with his neurologist, but they had no beds available. He was then transferred to [address] [place] [address] for further neurology work-up. Per Podiatry at [place] [id number] on 7/20/21   Vascular: Dorsalis pedis and posterior tibial pulses are palpable to right foot, DP is palpable to left. PT biphasic on doppler exam left. Skin temperature is warm  to cool from proximal tibial tuberosity to distal digits. CFT brisk to exposed digits. Edema noted to left heel and ankle, non-pitting. Hair growth present to bilateral feet. Quality of skin is wnl for patient age.      Dermatologic: Open lesion x2 noted to anterior aspect of right leg. The proximal lesion measures 2cm in diameter and displays a necrotic base with orange exudate present. The more distal lesion measures 3.5 cm in diameter with a necrotic base, serosanquinous and purulent drainage noted. To both lesions, negative probe to bone, negative malodor. Puncture wound with underlying eschar noted beneath superficial skin to left heel, plantar aspect. Post-debridement: left heel expresses 5cc of purulent drainage, probes to calcaneus. Extensive malodor present.      Neurovascular: Light touch sensation grossly absent to bilateral feet, restored at mid calf.      Musculoskeletal: Muscle strength 5/5 for all plantarflexors, dorsiflexors, inverters and everters to RLE, 4/5 for all quadrants LLE. Exquisite pain to palpation of left heel, and left ankle to the medial and posterior aspects. Cavus foot type noted bilateral. Semi-rigid contractures of digits 1-5 bilateral noted.  No amputations noted bilateral.       RLE   7/20/21       LLE post-debridement        7/24/21         CURRENT EVALUATION 7/29/2021    Afebrile  Vital signs stable    On evaluation, the patient is drowsy, but nonverbal.   EEG completed 07/28/21, showed abnormal bihemispheric dysfunction, no active seizures. Of note is an ammonia level of 109 which may be the cause of the patient's AMS. His liver profile from the 20th was normal.    We will continue to follow    Labs, X rays reviewed: 7/29/2021    BUN:8 --> 6  Cr:0.63 --> 0.62  CRP: 3.46     WBC:5.9 --> 5.6  Hb:13.5 --> 13.9  Plat: 254 --> 273    Cultures:  Urine:  ·   Blood:  ·   Sputum :  ·   Wound:  · 7/20/2021: MRSA    Images:    7/27/21 7/27/2021    Discussed with patient, RN, family. I have personally reviewed the past medical history, past surgical history, medications, social history, and family history, and I have updated the database accordingly. Past Medical History:     Past Medical History:   Diagnosis Date    Abnormal thyroid biopsy     s/p left hemithyroidectomy 1/3707 - follicular adenoma    Acid reflux     Anemia     resolved - previously seen by Dr. Imani Carlson (due to enlarged spleen)    Anesthesia     seizures with brain surgery due to blood sugar got really high    Bipolar disorder [facility] [address]     Blood clot in vein 04/07/2016    Katie Mouse     [facility]) 04/2019    thyroid    Chest pain     previously seeing [facility] cardiologist, now seeing Dr. Woody Mccarty (LAD bridging on cath 4/2016)    Chronic back pain     Chronic bronchitis ([facility] [address]     Dr. Marlon Hyman Colon polyp 08/30/2016    Depression     seeing Sherie Greenfield DVT (deep venous thrombosis) ([facility] [address] [phone]    not on Coumadin due to unable to regulate - Dr. Ziggy Brown - no etiology found per patient    Esophageal abnormality     nodule     Fatty liver disease, nonalcoholic     [facility]    Furuncle     legs    History of Doppler ultrasound 05/29/2011    No hemodynamically significant carotid stenosis is identified. A thyroid nodule on each side.  Dedicated ultrasound of thyroid gland is suggested to further evaluate if clinically indicated.  History of pulmonary embolism 2007    s/p GFF, related to knee surgery    Hx of blood clots     leg and lung    Hyperlipidemia     severely elevated triglycerides    Hypertension     diastolic    Intracranial arachnoid cyst 11/2012    Dr. Jase Sagastume drained, complicated with seizures, DKA    Irritable bowel syndrome     Liver disease     Bette Cheikh - elevated LFT - positive smooth muscle antibody, steatosis per liver bx 3/2014 with Dr. Pam Low (multiple drug resistant organisms) resistance 2012    MRSA (methicillin resistant staph aureus) culture positive     h/o in foot and before brain surgery    Nephrolithiasis     noted on CT abdomen 9/2016, 6/2019    Neuropathy     Pancreatic insufficiency     Positive SANDY (antinuclear antibody)     Dr. Chauncey Gordon - first visit in 6/2013    Prolonged emergence from general anesthesia     Restless legs syndrome     Seizures ([address]     started with brain surgery    Sinus tachycardia     Holter 3/2013 - seeing Dr. Savage Left [facility])     clips     Sleep apnea     positive sleep apnea per study 10/2020.     Systolic dysfunction     \"systolic bridge\"  EF 51% ECHO 9/2019    Type II or unspecified type diabetes mellitus without mention of complication, not stated as uncontrolled 2007       Past Surgical  History:     Past Surgical History:   Procedure Laterality Date    CARDIAC CATHETERIZATION      2011,5-6 years ago   [address]  3-2012   [address]  04/28/2016    [facility]     CARPAL TUNNEL RELEASE  01/2017    CHOLECYSTECTOMY  2004    COLONOSCOPY  2012    COLONOSCOPY  08/2016    2 tubular adenomas - reportedly needs repeat scope in 6 months    CRANIOTOMY  11/2012    arachnoid cyst drainage    EKG 12-LEAD  10/18/2015         ENDOSCOPY, COLON, DIAGNOSTIC      HERNIA REPAIR Right 1996    [facility]--Dr. Dee Baumgarten INGUINAL HERNIA REPAIR Right 09/15/2016    Robotic assisted    KNEE ARTHROSCOPY Right 2016    KNEE SURGERY Left 2007    acl and debrided twice    OTHER SURGICAL HISTORY  5-31-11    Tilt table was associated w/ nonspecific symptoms or nausea, otherwise no significant dizziness or syncope. No significant hemodynamic changes. Otherwise, unremarkable tilt table test after 30 minutes of tilting.  OTHER SURGICAL HISTORY  01/20/2017    RIGHT SHOULDER ARTHROSCOPY, OPEN STAN, OPEN ACROMIOPLASTY, BICEP TENDESIS, RIGHT CARPAL TUNNEL RELEASE    SHOULDER SURGERY Right 01/2007    THYROID LOBECTOMY N/A 1/15/2021    THYROID LOBECTOMY, UVULOPALATOPHARYNGOPLAST LAOP performed by Lyn Wilkins MD at [facility] ECHOCARDIOGRAM  3-05-11    LV size and systolic function normal. EF 55-65%.      UPPER GASTROINTESTINAL ENDOSCOPY  2012    UPPER GASTROINTESTINAL ENDOSCOPY  2016    Dr. La Calixto Left 10/22/2019    EGD DILATION SAVORY performed by Jose [facility] MD at [facility]    UPPER GASTROINTESTINAL ENDOSCOPY Left 10/22/2019    EGD BIOPSY performed by Jose Bernabe MD at [address]  2007       Medications:      insulin glargine  45 Units Subcutaneous BID    vancomycin (VANCOCIN) intermittent dosing (placeholder)   Other RX Placeholder    divalproex  750 mg Oral BID    zonisamide  500 mg Oral Nightly    DULoxetine  60 mg Oral Daily    pantoprazole  40 mg Oral BID    levETIRAcetam  2,000 mg Oral BID    metoprolol tartrate  75 mg Oral BID    rOPINIRole  1 mg Oral TID    insulin lispro  0-12 Units Subcutaneous TID WC    insulin lispro  0-6 Units Subcutaneous Nightly    lacosamide  200 mg Oral BID    lactobacillus  1 capsule Oral Daily    sodium chloride flush  5-40 mL Intravenous 2 times per day    vancomycin  1,250 mg Intravenous Q8H    enoxaparin  40 mg Subcutaneous Daily       Social History:     Social History     Socioeconomic History    Marital status: Systems:   Unable to assess as patient is nonverbal at this time    Constitutional: No fevers or chills. No systemic complaints  Head: No headaches  Eyes: No double vision or blurry vision. No conjunctival inflammation. ENT: No sore throat or runny nose. . No hearing loss, tinnitus or vertigo. Cardiovascular: No chest pain or palpitations. No shortness of breath. No EWING  Lung: No shortness of breath or cough. No sputum production  Abdomen: No nausea, vomiting, diarrhea, or abdominal pain. Raine Graven No cramps. Genitourinary: No increased urinary frequency, or dysuria. No hematuria. No suprapubic or CVA pain  Musculoskeletal: No muscle aches or pains. No joint effusions, swelling or deformities  Hematologic: No bleeding or bruising. Neurologic: No headache, weakness, numbness, or tingling. Integument: No rash, no ulcers. Psychiatric: No depression. Endocrine: No polyuria, no polydipsia, no polyphagia. Physical Examination :     Patient Vitals for the past 8 hrs:   BP Temp Temp src Pulse Resp SpO2 Weight   07/29/21 0506 -- -- -- -- -- -- 254 lb 3.2 oz (115.3 kg)   07/29/21 0338 (!) 158/98 97.5 °F (36.4 °C) Axillary 58 14 96 % --     General Appearance: Awake, alert, and in no apparent distress-nonverbal  Head:  Normocephalic, no trauma  Eyes: Pupils equal, round, reactive to light and accommodation; extraocular movements intact; sclera anicteric; conjunctivae pink. No embolic phenomena. ENT: Oropharynx clear, without erythema, exudate, or thrush. No tenderness of sinuses. Mouth/throat: mucosa pink and moist. No lesions. Dentition in good repair. Neck:Supple, without lymphadenopathy. Thyroid normal, No bruits. Pulmonary/Chest: Clear to auscultation, without wheezes, rales, or rhonchi. No dullness to percussion. Cardiovascular: Regular rate and rhythm without murmurs, rubs, or gallops. Abdomen: Soft, non tender.  Bowel sounds normal. No organomegaly  All four Extremities: No cyanosis, clubbing, edema, or effusions. Neurologic: No gross sensory or motor deficits. Skin: 2 healing ulcerations on the right lower leg and 1 left foot wound status post I&D sutures clean dry intact  Medical Decision Making -Laboratory:   I have independently reviewed/ordered the following labs:    CBC with Differential:   Recent Labs     07/28/21  1447 07/29/21  0516   WBC 5.5 5.6   HGB 13.5 13.9   HCT 38.6* 40.1*    273   LYMPHOPCT 26 28   MONOPCT 7 10     BMP:   Recent Labs     07/28/21  1447 07/29/21  0516    138   K 3.8 3.8    106   CO2 20 18*   BUN 6 6   CREATININE 0.63* 0.62*   MG  --  1.9     Hepatic Function Panel:   Recent Labs     07/28/21  1447   PROT 6.8   LABALBU 3.4*  3.4*   BILIDIR 0.16   IBILI 0.37   BILITOT 0.53   ALKPHOS 160*   ALT 53*   AST 35     No results for input(s): RPR in the last 72 hours. No results for input(s): HIV in the last 72 hours. No results for input(s): BC in the last 72 hours. Lab Results   Component Value Date    MUCUS Present 10/17/2015    PH 7.40 06/26/2020    PH 5.5 01/04/2012    RBC 4.64 07/29/2021    RBC 4.93 03/26/2012    WBC 5.6 07/29/2021    YEAST NONE SEEN 08/12/2019     Lab Results   Component Value Date    CREATININE 0.62 07/29/2021    GLUCOSE 191 07/29/2021    GLUCOSE 189 11/12/2015       Medical Decision Making-Imaging:   XR FOOT LEFT (MIN 3 VIEWS)     Result Date: 7/20/2021  PROCEDURE: XR FOOT LEFT (MIN 3 VIEWS) CLINICAL INFORMATION: pain COMPARISON: No prior study. TECHNIQUE: 4 views of the foot were obtained. FINDINGS: No acute fracture or dislocation is seen. There is no soft tissue swelling. There is a moderate-sized plantar calcaneal spur.      Plantar calcaneal spur. Otherwise normal left foot. **This report has been created using voice recognition software. It may contain minor errors which are inherent in voice recognition technology. ** Final report electronically signed by Dr. Vernon Carrera on 7/20/2021 3:57 PM     CT TIBIA FIBULA LEFT WO CONTRAST     Result Date: 7/20/2021  PROCEDURE: NONCONTRAST CT LEFT TIBIA/FIBULA: CLINICAL INFORMATION: Infection/ abscess TECHNIQUE: Multiple axial 3 mm images of the left tibia/fibula were obtained without administration of intravenous contrast material. Computer generated sagittal and coronal images of the left tibia/fibula were also reconstructed. ALL CT SCANS AT THIS FACILITY use dose modulation, iterative reconstruction, and/or weight-based dosing when appropriate to reduce radiation dose to as low as reasonably achievable. FINDINGS: There are bony defects in the distal femur and proximal tibia conjunction with prior anterior crucial ligament repair. No acute fracture is seen. Appears be a small bone cyst in the lateral malleolus. There is mild degenerative spurring of the distal femur. Note that there appear to be multiple superficial varicosities in the distal left calf      1. Degenerative changes of the left knee. Postsurgical changes left knee. Multiple varicosities this left calf. 2. Study otherwise unremarkable. No tibial or fibular fracture is seen. **This report has been created using voice recognition software. It may contain minor errors which are inherent in voice recognition technology. ** Final report electronically signed by Dr. Cornelio Monahan on 7/20/2021 9:07 PM       Medical Decision [id number]-[id number]:     7/23/2021  2:48 PM - Aubree Mendez Incoming Lab Results From [facility]    Specimen Information: Heel        Component Collected Lab   Anaerobic Culture 07/20/2021  6:15  [facility]   Culture yielded heavy mixed growth which included anaerobic gram negative bacilli and anaerobic gram positive cocci. If a true mixed aerobic and anaerobic infection is suspected, then broad spectrum empiric antibiotic therapy is indicated and should include coverage for anaerobic organisms. Gram Stain Result 07/20/2021  6:15  [facility]   Few segmented neutrophils observed. Rare epithelial cells observed. Many gram positive cocci occurring singly and in pairs. Organism Abnormal  07/20/2021  6:15  [facility]   Staphylococcus aureus    Aerobic Culture 07/20/2021  6:15  [facility]   light growth This is a MRSA (Methicillin Resistant Staphylococcus aureus)isolate. Isolates of MRSA (ORSA) Methicillin (Oxacillin) Resistant Staphylococcus aureus (coagulase positive) require patient be placed in CONTACT isolation. Methicillin(Oxacillin)resistant strains of staphylococci (MRSA)or(MRSE)should be considered resistant to all classes of cephalosporins, penems and beta-lactams. In the treatment of gram positive infections, GENTAMICIN should be CONSIDERED a SYNERGYSTIC agent ONLY. Ciprofloxacin and Levofloxacin, regardless of in vitro sensitivity, should not be used for staphylococcal infections other than uncomplicated lower UTIs. Testing Performed By    [facility] Name Director Address Valid Date Range   [facility] - [address] [facility] Darrin Epley, MD [address] [place] [id number] 08/30/17 0855-Present   Narrative  Performed by: [facility]  Source: heel       Site: swab + tissue left          Current Antibiotics: Vancomycin   Susceptibility    Staphylococcus aureus (1)    Antibiotic Interpretation JODI Status   gentamicin Sensitive <=0.5 mcg/mL Final   ICR (D test) Negative Neg  mcg/mL Final    (Dtest) ICR- inducible clinda resistance    If +, then inducible erm gene       present.  Clindamycin may be       effective in some patients.       oxacillin Resistant >=4 mcg/mL Final   clindamycin Sensitive <=0.25 mcg/mL Final   trimethoprim-sulfamethoxazole Sensitive <=10 mcg/mL Final   vancomycin Sensitive <=0.5 mcg/mL Final   tetracycline Sensitive <=1 mcg/mL Final   Lab and Collection    Culture, Anaerobic and Aerobic - 7/20/2021  Result History    Culture, Anaerobic and Aerobic on 7/23/2021 - Result Edited       Medical Decision Making-Other:     Note:  · Labs, medications, radiologic studies were reviewed with personal review of films  · Large amounts of data were reviewed  · Discussed with nursing Staff, Discharge planner  · Infection Control and Prevention measures reviewed  · All prior entries were reviewed  · Administer medications as ordered  · Prognosis: Fair  · Discharge planning reviewed    Thank you for allowing us to participate in the care of this patient. Please call with questions. Fernanda Becerril DPM     ATTESTATION:    I have discussed the case, including pertinent history and exam findings with the residents and students. I have seen and examined the patient and the key elements of the encounter have been performed by me. I was present when the student obtained his information or examined the patient. I have reviewed the laboratory data, other diagnostic studies and discussed them with the residents. I have updated the medical record where necessary. I agree with the assessment, plan and orders as documented by the resident/ student.     Debra Marquez MD.

## 2021-07-29 NOTE — PROCEDURES
VIDEO-EEG MONITORING REPORT      Patient Name: Sofya Headley  Dates recorded: 7/29/2021  MRN: [id number]    Electroencephalographer: Stephon Maradiaga MD       CLINICAL DETAILS:  This EEG was performed on this 46 y. o.yo male admitted for persistent seizures video study to explore semiology of events      MEDICATIONS:   Current Facility-Administered Medications:     insulin glargine (LANTUS) injection vial 45 Units, 45 Units, Subcutaneous, BID, Emma Reginaldo DO, 45 Units at 07/29/21 0914    vancomycin (VANCOCIN) intermittent dosing (placeholder), , Other, RX Placeholder, Jossy Díaz DO    divalproex (DEPAKOTE) DR tablet 750 mg, 750 mg, Oral, BID, Tona Boateng MD, 750 mg at 07/29/21 0914    zonisamide (ZONEGRAN) capsule 500 mg, 500 mg, Oral, Nightly, Kathaleen Spindle, APRN - CNS, 500 mg at 07/28/21 2034    DULoxetine (CYMBALTA) extended release capsule 60 mg, 60 mg, Oral, Daily, Kathaleen Spindle, APRN - CNS, 60 mg at 07/29/21 0914    pantoprazole (PROTONIX) tablet 40 mg, 40 mg, Oral, BID, Kathaleen Spindle, APRN - CNS, 40 mg at 07/29/21 0914    levETIRAcetam (KEPPRA) tablet 2,000 mg, 2,000 mg, Oral, BID, Kathaleen Spindle, APRN - CNS, 2,000 mg at 07/29/21 8635    metoprolol tartrate (LOPRESSOR) tablet 75 mg, 75 mg, Oral, BID, Kathaleen Spindle, APRN - CNS, 75 mg at 07/29/21 0913    rOPINIRole (REQUIP) tablet 1 mg, 1 mg, Oral, TID, Kathaleen Spindle, APRN - CNS, 1 mg at 07/29/21 0914    insulin lispro (HUMALOG) injection vial 0-12 Units, 0-12 Units, Subcutaneous, TID WC, Kathaleen Spindle, APRN - CNS, 2 Units at 07/29/21 0914    insulin lispro (HUMALOG) injection vial 0-6 Units, 0-6 Units, Subcutaneous, Nightly, Kathaleen Spindle, APRN - CNS, 2 Units at 07/28/21 9693    glucose (GLUTOSE) 40 % oral gel 15 g, 15 g, Oral, PRN, Arsalan Spicer, NICANOR - CNS    dextrose 50 % IV solution, 12.5 g, Intravenous, PRN, NICANOR Jay - CNS    glucagon (rDNA) injection 1 mg, 1 mg, Intramuscular, PRN, Dayana Macedo Case Look, APRN - CNS    dextrose 5 % solution, 100 mL/hr, Intravenous, PRN, Cynthia Nearing, APRN - CNS    lacosamide (VIMPAT) tablet 200 mg, 200 mg, Oral, BID, Cynthia Nearing, APRN - CNS, 200 mg at 07/29/21 0914    lactobacillus (CULTURELLE) capsule 1 capsule, 1 capsule, Oral, Daily, Cynthia Nearing, APRN - CNS, 1 capsule at 07/29/21 0914    sodium chloride flush 0.9 % injection 5-40 mL, 5-40 mL, Intravenous, 2 times per day, Cynthia Nearing, APRN - CNS, 10 mL at 07/29/21 0919    sodium chloride flush 0.9 % injection 10 mL, 10 mL, Intravenous, PRN, Cynthia Nearing, APRN - CNS    0.9 % sodium chloride infusion, 25 mL, Intravenous, PRN, Cynthia Nearing, APRN - CNS    potassium chloride (KLOR-CON M) extended release tablet 40 mEq, 40 mEq, Oral, PRN **OR** potassium bicarb-citric acid (EFFER-K) effervescent tablet 40 mEq, 40 mEq, Oral, PRN **OR** potassium chloride 10 mEq/100 mL IVPB (Peripheral Line), 10 mEq, Intravenous, PRN, Cynthia Nearing, APRN - CNS    magnesium sulfate 1000 mg in dextrose 5% 100 mL IVPB, 1,000 mg, Intravenous, PRN, Cynthia Nearing, APRN - CNS    ondansetron (ZOFRAN-ODT) disintegrating tablet 4 mg, 4 mg, Oral, Q8H PRN **OR** ondansetron (ZOFRAN) injection 4 mg, 4 mg, Intravenous, Q6H PRN, Cynthia Nearing, APRN - CNS    polyethylene glycol (GLYCOLAX) packet 17 g, 17 g, Oral, Daily PRN, Cynthia Nearing, APRN - CNS    acetaminophen (TYLENOL) tablet 650 mg, 650 mg, Oral, Q6H PRN **OR** acetaminophen (TYLENOL) suppository 650 mg, 650 mg, Rectal, Q6H PRN, Cynthia Nearing, APRN - CNS    vancomycin (VANCOCIN) 1250 mg in dextrose 5 % 250 mL IVPB, 1,250 mg, Intravenous, Q8H, Mohammad I Mashaleh, DO, Stopped at 07/28/21 2204    enoxaparin (LOVENOX) injection 40 mg, 40 mg, Subcutaneous, Daily, NICANOR Goodman NP, 40 mg at 07/29/21 0913      TECHNICAL DETAILS:  Continuous video-EEG monitoring was performed with 27 surface scalp electrodes placed according to the International 10-20 electrode placement system, using a 32-channel [facility] headbox. All EEG and video information was acquired digitally, including the use of automated spike and seizure detection software to detect epileptiform activity. An event button was also available to be depressed during clinical events. RESULTS:    This was an abnormal EEG due to:  1. Continuous generalized attenuated 0.5 -1 hz delta slowing of the background with occasional faster frequencies that shows minimal reactivity. Rhythmic bifrontal sharpslow sharp discharges occurring every 1 to 2 seconds are noted in the left frontal region consistent with PLEDS. These extend across the midline and are reflected in the right frontal area as well  2. No clear anterior-posterior gradient  3. No clear sleep-wake cycle    ACTIVATION PROCEDURES  Hyperventilation and photic stimulation: not performed     EKG  A single EKG channel showed a generally regular rhythm throughout the recording    COMPARISON  None    EVENTS RECORDED NONE     Initial review [id number] hrs. Day 2  Time of review 1600 HOURS  through 0930 HOURS  As the tracing continues, similar background activity persists and there is the development at times of high voltage more rhythmic right frontal slowing  and some degree of resolution of the PLED type activity. No recorded seizures  IMPRESSION   Severely abnormal record with evidence of bihemispheric dysfunction no active seizures recorded but clear-cut ictal propensity recording consistent with resolved status head dominant focus appears to be residual left frontal area.   As the tracing continues, impression remains the same      MD Luz Pizarro, American Board of Psychiatry and Neurology

## 2021-07-29 NOTE — PROGRESS NOTES
Physical Therapy        Physical Therapy Cancel Note      DATE: 2021    NAME: Susana Ashley  MRN: [id number]   : 1970      Patient not seen this date for Physical Therapy due to: Other: OT in room doing evaluation.       Electronically signed by Gema Chatman PT on 2021 at 1:09 PM

## 2021-07-29 NOTE — PLAN OF CARE
Problem: Nutritional:  Goal: Nutritional status will improve  Description: Nutritional status will improve  7/29/2021 0251 by Jose Khan RN  Outcome: Ongoing       Problem: Physical Regulation:  Goal: Will remain free from infection  Description: Will remain free from infection  7/29/2021 0251 by Jose Khan RN  Outcome: Ongoing    Goal: Ability to maintain vital signs within normal range will improve  Description: Ability to maintain vital signs within normal range will improve  7/29/2021 0251 by Jose Khan RN  Outcome: Ongoing    Goal: Signs of adequate cerebral perfusion will increase  Description: Signs of adequate cerebral perfusion will increase  7/29/2021 0251 by Jose Khan RN  Outcome: Ongoing    Goal: Ability to maintain a stable neurologic state will improve  Description: Ability to maintain a stable neurologic state will improve  7/29/2021 0251 by Jose Khan RN  Outcome: Ongoing       Problem: Skin Integrity:  Goal: Demonstration of wound healing without infection will improve  Description: Demonstration of wound healing without infection will improve  7/29/2021 0251 by Jose Khan RN  Outcome: Ongoing    Goal: Will show no infection signs and symptoms  Description: Will show no infection signs and symptoms  7/29/2021 0251 by Jose Khan RN  Outcome: Ongoing    Goal: Absence of new skin breakdown  Description: Absence of new skin breakdown  7/29/2021 0251 by Jose Khan RN  Outcome: Ongoing       Problem: Coping:  Goal: Ability to cope will improve  Description: Ability to cope will improve  7/29/2021 0251 by Jose Khan RN  Outcome: Ongoing    Goal: Ability to identify appropriate support needs will improve  Description: Ability to identify appropriate support needs will improve  7/29/2021 0251 by Jose Khan RN  Outcome: Ongoing       Problem: Health Behavior:  Goal: Ability to manage health-related needs will improve  Description: Ability to manage health-related needs will improve  7/29/2021 0251 by Ilan Gomes RN  Outcome: Ongoing       Problem: Safety:  Goal: Ability to remain free from injury will improve  Description: Ability to remain free from injury will improve  7/29/2021 0251 by Ilan Gomes RN  Outcome: Ongoing       Problem: Self-Concept:  Goal: Level of anxiety will decrease  Description: Level of anxiety will decrease  7/29/2021 0251 by Ilan Gomes RN  Outcome: Ongoing    Goal: Ability to verbalize feelings about condition will improve  Description: Ability to verbalize feelings about condition will improve  7/29/2021 0251 by Ilan Gomes RN  Outcome: Ongoing    Problem: Falls - Risk of:  Goal: Will remain free from falls  Description: Will remain free from falls  Outcome: Ongoing  Goal: Absence of physical injury  Description: Absence of physical injury  Outcome: Ongoing     Problem: Musculor/Skeletal Functional Status  Goal: Highest potential functional level  Outcome: Ongoing     Problem: Pain:  Goal: Pain level will decrease  Description: Pain level will decrease  Outcome: Ongoing  Goal: Control of acute pain  Description: Control of acute pain  Outcome: Ongoing  Goal: Control of chronic pain  Description: Control of chronic pain  Outcome: Ongoing

## 2021-07-29 NOTE — CARE COORDINATION
Case Management Initial Discharge Plan  Fei James,             Met with:patient's SO to discuss discharge plans. Information verified: address, contacts, phone number, , insurance Yes  Insurance Provider:   Medicare A and B  Emergency Contact/Next of Kin name & Maxx Marcial  Other    [phone]       Who are involved in patient's support system? Patient's wife    PCP: Sonia Huerta MD  Date of last visit: last tuesday      Discharge Planning    Living Arrangements:        Home has 1 stories  1 stairs to climb to get into front door,   Patient able to perform ADL's:Independent    Current Services (outpatient & in home)    DME equipment: glucometer, cane/RW PRN. DME provider:     Is patient receiving oral anticoagulation therapy? No    Potential Assistance Needed:       Patient agreeable to home care: Yes  Freedom of choice provided:  yes    Prior SNF/Rehab Placement and Facility:  Agreeable to SNF/Rehab: Yes  [place] of choice provided: yes     Evaluation: n/a    Expected Discharge date:       Patient expects to be discharged to: If home: is the family and/or caregiver wiling & able to provide support at home? Yes   Who will be providing this support? Patient's wife    Follow Up Appointment: Best Day/ Time:      Transportation provider: patient's wife  Transportation arrangements needed for discharge: Yes    Readmission Risk              Risk of Unplanned Readmission:  17             Does patient have a readmission risk score greater than 14?: Yes  If yes, follow-up appointment must be made within 7 days of discharge. Goals of Care:       Educated patient and patient's wife on transitional options, provided freedom of choice and are agreeable with plan      Discharge Plan: home goal; agreeable to [facility], SNF and ARU depending on needs. Lists provided. Patient currently on LTME.              Electronically signed by Lino Parikh RN on 21 at 6:10 PM EDT

## 2021-07-29 NOTE — PROGRESS NOTES
Physical Therapy  Facility/Department: [facility] 4A STEPDOWN  Daily Treatment Note  NAME: Mae Grullon  : 1970  MRN: [id number]    Date of Service: 2021    Discharge Recommendations: Further therapy recommended at discharge. Patient would benefit from continued therapy after discharge        Assessment   Body structures, Functions, Activity limitations: Decreased functional mobility ; Decreased endurance;Decreased strength  Assessment: Pt amelie EOB for ~10 minutes with CGA. Pt demo severe cervical flexion, required max cueing to correct, with poor return. session was limietd d/t pt not following commands this date. Pt will benefit from continued therapy to address deficits. Prognosis: Good  Decision Making: Medium Complexity  REQUIRES PT FOLLOW UP: Yes  Activity Tolerance: session was limited d/t pt not following commands. Activity Tolerance: Patient limited by fatigue;Patient limited by endurance     Patient Diagnosis(es): There were no encounter diagnoses.      has a past medical history of Abnormal thyroid biopsy, Acid reflux, Anemia, Anesthesia, Bipolar disorder ([address] Blood clot in vein, Cancer ([address] Chest pain, Chronic back pain, Chronic bronchitis ([address] Colon polyp, Depression, DVT (deep venous thrombosis) ([address] Esophageal abnormality, Fatty liver disease, nonalcoholic, Furuncle, History of Doppler ultrasound, History of pulmonary embolism, Hx of blood clots, Hyperlipidemia, Hypertension, Intracranial arachnoid cyst, Irritable bowel syndrome, Liver disease, MDRO (multiple drug resistant organisms) resistance, MRSA (methicillin resistant staph aureus) culture positive, Nephrolithiasis, Neuropathy, Pancreatic insufficiency, Positive SANDY (antinuclear antibody), Prolonged emergence from general anesthesia, Restless legs syndrome, Seizures ([address] Sinus tachycardia, Skull fracture ([address] Sleep apnea, Systolic dysfunction, and Type II or unspecified type diabetes mellitus without mention of complication, presence;Distraction; Emotional support;Repositioned; Rest  Response to Pain Intervention: Patient Satisfied  Vital Signs  Patient Currently in Pain: Yes       Orientation  Orientation  Overall Orientation Status: Pt would not answer questions. Very minimal verbal communication. Cognition    NICOLE  Objective   Bed mobility  Supine to Sit: Contact guard assistance  Sit to Supine: Contact guard assistance  Scooting: Contact guard assistance  Comment: Pt able to perform bed mobs with increase time and max cueing to stay on task, with fair return. Transfers  Comment: unable to assess transfer at this date d/t patient not following commands. Ambulation  Ambulation?: No     Balance  Posture: Fair  Sitting - Static: Fair  Sitting - Dynamic: Fair  Comments: unable to assess standing balance at this time    Exercises: Seated LE exercise program: Long Arc Quads, hip abduction/adduction, heel/toe raises, and marches. Reps: 10x AAROM  upper extremities: perform 10 reps ABD with yellow TB.    AM-PAC Score  -PAC Inpatient Mobility Raw Score : 16 (07/29/21 1454)  -PAC Inpatient T-Scale Score : 40.78 (07/29/21 1454)  Mobility Inpatient CMS 0-100% Score: 54.16 (07/29/21 1454)  Mobility Inpatient CMS G-Code Modifier : CK (07/29/21 1454)          Goals  Short term goals  Time Frame for Short term goals: 10  visits  Short term goal 1: transfers with SBA  Short term goal 2: amb 25 ft with a RW x CCGA with NWB L LE WBAT R LE  Short term goal 3: to be independent with bed mobility  Short term goal 4: 20 min exercise program x SBA  Patient Goals   Patient goals : Return home    Plan    Plan  Times per week: 5-6x wk  Times per day: Daily  Current Treatment Recommendations: Strengthening, Functional Mobility Training, Transfer Training, Gait Training, Endurance Training, Pain Management, Safety Education & Training  Safety Devices  Type of devices: Nurse notified, Left in bed, Call light within reach     Therapy Time   Individual Concurrent Group Co-treatment   Time In 1420         Time Out 1440         Minutes 20         Timed Code Treatment Minutes: 20 Minutes     Treatment performed by Student PTA under the supervision of co-signing PTA who agrees with all treatment and documentation. Sia Oliveravnicki SPTA.

## 2021-07-29 NOTE — PROGRESS NOTES
Takes 10 units with meals plus group 2 sliding scale      promethazine (PHENERGAN) 12.5 MG tablet Take 1-2 tablets by mouth every 8 hours as needed for Nausea 60 tablet 1    blood glucose test strips (ONETOUCH ULTRA) strip 1 each by In Vitro route 3 times daily DX: E11.65 Dispense Onetouch Ultra 2 test strips 300 each 3    pantoprazole (PROTONIX) 40 MG tablet Take 1 tablet by mouth 2 times daily 180 tablet 1    vitamin D (ERGOCALCIFEROL) 1.25 MG (96441 UT) CAPS capsule Take 1 capsule by mouth once a week 12 capsule 1    insulin lispro, 1 Unit Dial, (HUMALOG KWIKPEN) 100 UNIT/ML SOPN Inject 10 Units into the skin 3 times daily Plus sliding scale 2 9 mL 0    insulin glargine (LANTUS SOLOSTAR) 100 UNIT/ML injection pen Inject 75 Units into the skin 2 times daily      gabapentin (NEURONTIN) 600 MG tablet Take 600 mg by mouth 4 times daily.  rOPINIRole (REQUIP) 1 MG tablet TAKE 1 TABLET BY MOUTH THREE TIMES A  tablet 1    metoprolol tartrate (LOPRESSOR) 50 MG tablet Take 1.5 tablets by mouth 2 times daily (Patient taking differently: Take 50 mg by mouth 2 times daily ) 270 tablet 1    empagliflozin (JARDIANCE) 25 MG tablet Take 25 mg by mouth daily 42 tablet 0    DULoxetine (CYMBALTA) 60 MG extended release capsule Take by mouth daily 2 tab      lacosamide (VIMPAT) 100 MG TABS tablet Take 200 mg by mouth 2 times daily.  sildenafil (VIAGRA) 100 MG tablet Take 1 tablet by mouth as needed for Erectile Dysfunction 45 tablet 3    divalproex (DEPAKOTE) 500 MG DR tablet Take 500 mg by mouth 2 times daily       levETIRAcetam (KEPPRA) 500 MG tablet Take 2,000 mg by mouth 2 times daily       zonisamide (ZONEGRAN) 100 MG capsule Take by mouth nightly 5 capsules         Allergies: [place] is allergic to ciprofloxacin-ciproflox hcl er, heparin, metformin, niacin and related, tramadol hcl, ultram [tramadol], and warfarin.     Past Medical History:   Diagnosis Date    Abnormal thyroid biopsy s/p left hemithyroidectomy 0/0565 - follicular adenoma    Acid reflux     Anemia     resolved - previously seen by Dr. Danika Quiroz (due to enlarged spleen)    Anesthesia     seizures with brain surgery due to blood sugar got really high    Bipolar disorder ([facility] [address]     Blood clot in vein 04/07/2016    [facility]     Cancer [facility]) 04/2019    thyroid    Chest pain     previously seeing [facility] cardiologist, now seeing Dr. Luis Carlos Thompson (LAD bridging on cath 4/2016)    Chronic back pain     Chronic bronchitis ([facility] [address]     Dr. Simeon Enrique Colon polyp 08/30/2016    Depression     seeing Charisma Lopez DVT (deep venous thrombosis) ([facility] [address] [phone]    not on Coumadin due to unable to regulate - Dr. Malissa Delgado - no etiology found per patient    Esophageal abnormality     nodule     Fatty liver disease, nonalcoholic     U/S 77/2845 [facility]    [facility]     legs    History of Doppler ultrasound 05/29/2011    No hemodynamically significant carotid stenosis is identified. A thyroid nodule on each side. Dedicated ultrasound of thyroid gland is suggested to further evaluate if clinically indicated.      History of pulmonary embolism 2007    s/p GFF, related to knee surgery    Hx of blood clots     leg and lung    Hyperlipidemia     severely elevated triglycerides    Hypertension     diastolic    Intracranial arachnoid cyst 11/2012    Dr. Morales Ohm drained, complicated with seizures, DKA    Irritable bowel syndrome     Liver disease     [place] - elevated LFT - positive smooth muscle antibody, steatosis per liver bx 3/2014 with Dr. Donnie Saleh (multiple drug resistant organisms) resistance 2012    MRSA (methicillin resistant staph aureus) culture positive     h/o in foot and before brain surgery    Nephrolithiasis     noted on CT abdomen 9/2016, 6/2019    Neuropathy     Pancreatic insufficiency     Positive SANDY (antinuclear antibody)     Dr. Niko Horvath - first visit in 6/2013    Prolonged emergence from general anesthesia     Restless legs syndrome     Seizures (HCC)     started with brain surgery    Sinus tachycardia     Holter 3/2013 - seeing Dr. Florencio Mackey Skull fracture [facility])     clips     Sleep apnea     positive sleep apnea per study 10/2020.  Systolic dysfunction     \"systolic bridge\"  EF 75% ECHO 9/2019    Type II or unspecified type diabetes mellitus without mention of complication, not stated as uncontrolled 2007       Past Surgical History:   Procedure Laterality Date    CARDIAC CATHETERIZATION      2011,5-6 years ago   [address]  3-2012   [address]  04/28/2016    [facility]     CARPAL TUNNEL RELEASE  01/2017    CHOLECYSTECTOMY  2004    COLONOSCOPY  2012    COLONOSCOPY  08/2016    2 tubular adenomas - reportedly needs repeat scope in 6 months    CRANIOTOMY  11/2012    arachnoid cyst drainage    EKG 12-LEAD  10/18/2015         ENDOSCOPY, COLON, DIAGNOSTIC      HERNIA REPAIR Right 1996    [facility]--Dr. Nikolas Ty INGUINAL HERNIA REPAIR Right 09/15/2016    Robotic assisted    KNEE ARTHROSCOPY Right 2016    KNEE SURGERY Left 2007    acl and debrided twice    OTHER SURGICAL HISTORY  5-31-11    Tilt table was associated w/ nonspecific symptoms or nausea, otherwise no significant dizziness or syncope. No significant hemodynamic changes. Otherwise, unremarkable tilt table test after 30 minutes of tilting.  OTHER SURGICAL HISTORY  01/20/2017    RIGHT SHOULDER ARTHROSCOPY, OPEN STAN, OPEN ACROMIOPLASTY, BICEP TENDESIS, RIGHT CARPAL TUNNEL RELEASE    SHOULDER SURGERY Right 01/2007    THYROID LOBECTOMY N/A 1/15/2021    THYROID LOBECTOMY, UVULOPALATOPHARYNGOPLAST LAOP performed by Ezio Rm MD at [facility] ECHOCARDIOGRAM  3-05-11    LV size and systolic function normal. EF 55-65%.      UPPER GASTROINTESTINAL ENDOSCOPY  2012    UPPER GASTROINTESTINAL ENDOSCOPY  2016    Dr. Amirah White Left 10/22/2019    EGD DILATION SAVORY performed by Veda Subramanian MD at [facility]    UPPER GASTROINTESTINAL ENDOSCOPY Left 10/22/2019    EGD BIOPSY performed by Veda Subramanian MD at [facility]   [place]  2007       Medications:     insulin glargine  45 Units Subcutaneous BID    vancomycin (VANCOCIN) intermittent dosing (placeholder)   Other RX Placeholder    divalproex  750 mg Oral BID    zonisamide  500 mg Oral Nightly    DULoxetine  60 mg Oral Daily    pantoprazole  40 mg Oral BID    levETIRAcetam  2,000 mg Oral BID    metoprolol tartrate  75 mg Oral BID    rOPINIRole  1 mg Oral TID    insulin lispro  0-12 Units Subcutaneous TID WC    insulin lispro  0-6 Units Subcutaneous Nightly    lacosamide  200 mg Oral BID    lactobacillus  1 capsule Oral Daily    sodium chloride flush  5-40 mL Intravenous 2 times per day    vancomycin  1,250 mg Intravenous Q8H    enoxaparin  40 mg Subcutaneous Daily     PRN Meds include: glucose, dextrose, glucagon (rDNA), dextrose, sodium chloride flush, sodium chloride, potassium chloride **OR** potassium alternative oral replacement **OR** potassium chloride, magnesium sulfate, ondansetron **OR** ondansetron, polyethylene glycol, acetaminophen **OR** acetaminophen    Objective:   /69   Pulse 58   Temp 98 °F (36.7 °C) (Oral)   Resp 13   Ht 6' 2\" (1.88 m)   Wt 254 lb (115.2 kg)   SpO2 98%   BMI 32.61 kg/m²     Blood pressure range: Systolic (35GAY), FHW:064 , Min:129 , NYH:558   ; Diastolic (87XIE), CXD:29, Min:69, Max:98      ROS:  Review of Systems   Constitutional: Positive for appetite change and fatigue. Negative for activity change, chills and fever. HENT: Negative for hearing loss, tinnitus and trouble swallowing. Eyes: Negative for photophobia, pain and visual disturbance. Respiratory: Negative for apnea, cough, choking, chest tightness, shortness of breath and wheezing. Cardiovascular: Negative for chest pain and palpitations. Gastrointestinal: Negative for abdominal distention, abdominal pain, constipation, diarrhea, nausea and vomiting. Genitourinary: Negative for dysuria, flank pain, frequency and urgency. Musculoskeletal: Negative for arthralgias, back pain, gait problem, myalgias, neck pain and neck stiffness. Neurological: Negative for dizziness, tremors, seizures, syncope, facial asymmetry, speech difficulty, weakness, light-headedness, numbness and headaches. Psychiatric/Behavioral: Positive for confusion. Negative for agitation, behavioral problems, decreased concentration, dysphoric mood, hallucinations and sleep disturbance. The patient is not nervous/anxious and is not hyperactive. NEUROLOGIC EXAMINATION  Physical Exam  Constitutional:       Appearance: Normal appearance. HENT:      Head:      Comments: Evidence of craniotomy     Mouth/Throat:      Mouth: Mucous membranes are moist.      Pharynx: Oropharynx is clear. Eyes:      Extraocular Movements: Extraocular movements intact and EOM normal.      Conjunctiva/sclera: Conjunctivae normal.      Pupils: Pupils are equal, round, and reactive to light. Cardiovascular:      Rate and Rhythm: Normal rate and regular rhythm. Pulses: Normal pulses. Heart sounds: Normal heart sounds. Pulmonary:      Effort: Pulmonary effort is normal.      Breath sounds: Normal breath sounds. Abdominal:      General: Abdomen is flat. Palpations: Abdomen is soft. Musculoskeletal:         General: Normal range of motion. Cervical back: Normal range of motion and neck supple. Skin:     Findings: Lesion present. Comments: Wound on left heel, 2 wounds on the right shin with cellulitis   Neurological:      Coordination: Finger-Nose-Finger Test and Heel to [facility] Test normal.      Deep Tendon Reflexes:      Reflex Scores:       Tricep reflexes are 2+ on the right side and 2+ on the left side.        Bicep reflexes are 2+ on the right side and 2+ on the left side. Brachioradialis reflexes are 2+ on the right side and 2+ on the left side. Patellar reflexes are 2+ on the right side and 2+ on the left side. Achilles reflexes are 2+ on the right side and 2+ on the left side. Psychiatric:         Mood and Affect: Mood normal.         Speech: Speech is slurred. Behavior: Behavior normal.     .  Neurologic Exam     Mental Status   Oriented to person. Oriented to place. Disoriented to time. Registration: recalls 2 of 3 objects. Follows 1 step commands. Attention: normal. Concentration: decreased. Speech: slurred   Level of consciousness: alert  Knowledge: good. Unable to perform simple calculations. Able to name object. Able to read. Able to repeat. Able to write. Normal comprehension. Cranial Nerves     CN II   Visual fields full to confrontation. CN III, IV, VI   Pupils are equal, round, and reactive to light. Extraocular motions are normal.   CN III: no CN III palsy  CN VI: no CN VI palsy  Nystagmus: none   Diplopia: none  Ophthalmoparesis: none  Upgaze: normal  Downgaze: normal  Conjugate gaze: present    CN V   Facial sensation intact. CN VII   Facial expression full, symmetric.      CN VIII   CN VIII normal.     CN IX, X   CN IX normal.   CN X normal.   Palate: symmetric    CN XI   CN XI normal.   Right sternocleidomastoid strength: normal  Left sternocleidomastoid strength: normal  Right trapezius strength: normal  Left trapezius strength: normal    CN XII   CN XII normal.     Motor Exam   Muscle bulk: normal  Overall muscle tone: normal  Right arm tone: normal  Left arm tone: normal  Right arm pronator drift: absent  Left arm pronator drift: absent  Right leg tone: normal  Left leg tone: normal    Strength   Right neck flexion: 5/5  Left neck flexion: 5/5  Right neck extension: 5/5  Left neck extension: 5/5  Right deltoid: 5/5  Left deltoid: 5/5  Right biceps: 5/5  Left biceps: 5/5  Right triceps: 5/5  Left 3.8   * 106 106   CO2 20 20 18*   BUN 8 6 6   CREATININE 0.63* 0.63* 0.62*   GLUCOSE 160* 193* 191*         Lab Results   Component Value Date    CHOL 147 12/29/2020    HDL 26 12/29/2020    TRIG 246 (H) 04/06/2021    ALT 53 (H) 07/28/2021    AST 35 07/28/2021    TSH 1.72 07/28/2021    INR 1.08 01/23/2021    GLUF 283 (H) 10/17/2015    LABA1C 8.3 (H) 07/20/2021    LABMICR < 1.20 12/29/2020    [id number] 768 04/21/2021       Lab Results   Component Value Date    PHENYTOIN 1.7 (L) 05/17/2013    VALPROATE 44 (L) 07/27/2021    VALPROATE 3.9 (L) 07/27/2021       IMAGING  Radiology:(past 2 days)  LTME 7/28/2021: Continuous generalized attenuation with 0.5 to 1 Hz delta slowing of background with occasional faster frequencies that show minimal reactivity. Rhythmic bifrontal sharp slow sharp discharges occur every 1 to 2 seconds are noted in the left frontal region consistent with PLEDS. These extend across the midline and are reflected in the right frontal area. No seizure activity captured    LTME 7/29/2021 background activity persists dominant of high voltage more rhythmic frontal slowing and some degree of resolution of the PLEDs type activity. No active seizures but clear-cut ictal propensity    ASSESSMENT  66-year-old male presents as a transfer from [facility] for altered mental status and uncontrolled seizures. He has cellulitis of the right leg with 2 wounds and left heel wound. On 4 antiepileptics. increased dose of Depakote to 750 twice daily, patient is tolerating well. PLAN  1.)  Continue LTME and correlate clinical findings  2.)  Follow-up on serum drug levels  3.)  Continue current AEDs.   Keppra 2000mg twice daily zonisamide 100 nightly, Depakote 750 mg twice daily, Vimpat 200 mg twice daily  4.)  Current antibiotics as per primary  5.)  Neurology will follow up      Elisa Holden MD  PGY-1 Resident

## 2021-07-30 LAB
-: NORMAL
ABSOLUTE EOS #: 0.12 K/UL (ref 0–0.44)
ABSOLUTE IMMATURE GRANULOCYTE: 0.09 K/UL (ref 0–0.3)
ABSOLUTE LYMPH #: 1.93 K/UL (ref 1.1–3.7)
ABSOLUTE MONO #: 0.54 K/UL (ref 0.1–1.2)
AMMONIA: 142 UMOL/L (ref 16–60)
AMMONIA: 53 UMOL/L (ref 16–60)
ANION GAP SERPL CALCULATED.3IONS-SCNC: 10 MMOL/L (ref 9–17)
BASOPHILS # BLD: 1 % (ref 0–2)
BASOPHILS ABSOLUTE: 0.04 K/UL (ref 0–0.2)
BUN BLDV-MCNC: 6 MG/DL (ref 6–20)
BUN/CREAT BLD: ABNORMAL (ref 9–20)
CALCIUM SERPL-MCNC: 9.1 MG/DL (ref 8.6–10.4)
CHLORIDE BLD-SCNC: 106 MMOL/L (ref 98–107)
CO2: 22 MMOL/L (ref 20–31)
CREAT SERPL-MCNC: 0.7 MG/DL (ref 0.7–1.2)
DIFFERENTIAL TYPE: ABNORMAL
EOSINOPHILS RELATIVE PERCENT: 2 % (ref 1–4)
GFR AFRICAN AMERICAN: >60 ML/MIN
GFR NON-AFRICAN AMERICAN: >60 ML/MIN
GFR SERPL CREATININE-BSD FRML MDRD: ABNORMAL ML/MIN/{1.73_M2}
GFR SERPL CREATININE-BSD FRML MDRD: ABNORMAL ML/MIN/{1.73_M2}
GLUCOSE BLD-MCNC: 161 MG/DL (ref 75–110)
GLUCOSE BLD-MCNC: 182 MG/DL (ref 70–99)
GLUCOSE BLD-MCNC: 183 MG/DL (ref 75–110)
GLUCOSE BLD-MCNC: 206 MG/DL (ref 75–110)
GLUCOSE BLD-MCNC: 211 MG/DL (ref 75–110)
GLUCOSE BLD-MCNC: 294 MG/DL (ref 75–110)
HCT VFR BLD CALC: 41.9 % (ref 40.7–50.3)
HEMOGLOBIN: 14.8 G/DL (ref 13–17)
IMMATURE GRANULOCYTES: 2 %
LYMPHOCYTES # BLD: 33 % (ref 24–43)
MCH RBC QN AUTO: 30 PG (ref 25.2–33.5)
MCHC RBC AUTO-ENTMCNC: 35.3 G/DL (ref 28.4–34.8)
MCV RBC AUTO: 85 FL (ref 82.6–102.9)
MONOCYTES # BLD: 9 % (ref 3–12)
NRBC AUTOMATED: 0 PER 100 WBC
PDW BLD-RTO: 11.9 % (ref 11.8–14.4)
PLATELET # BLD: ABNORMAL K/UL (ref 138–453)
PLATELET ESTIMATE: ABNORMAL
PLATELET, FLUORESCENCE: NORMAL K/UL (ref 138–453)
PLATELET, IMMATURE FRACTION: NORMAL % (ref 1.1–10.3)
PMV BLD AUTO: ABNORMAL FL (ref 8.1–13.5)
POTASSIUM SERPL-SCNC: 3.9 MMOL/L (ref 3.7–5.3)
RBC # BLD: 4.93 M/UL (ref 4.21–5.77)
RBC # BLD: ABNORMAL 10*6/UL
REASON FOR REJECTION: NORMAL
SEG NEUTROPHILS: 54 % (ref 36–65)
SEGMENTED NEUTROPHILS ABSOLUTE COUNT: 3.15 K/UL (ref 1.5–8.1)
SODIUM BLD-SCNC: 138 MMOL/L (ref 135–144)
WBC # BLD: 5.9 K/UL (ref 3.5–11.3)
WBC # BLD: ABNORMAL 10*3/UL
ZZ NTE CLEAN UP: ORDERED TEST: NORMAL
ZZ NTE WITH NAME CLEAN UP: SPECIMEN SOURCE: NORMAL

## 2021-07-30 PROCEDURE — 6370000000 HC RX 637 (ALT 250 FOR IP): Performed by: INTERNAL MEDICINE

## 2021-07-30 PROCEDURE — 6360000002 HC RX W HCPCS: Performed by: STUDENT IN AN ORGANIZED HEALTH CARE EDUCATION/TRAINING PROGRAM

## 2021-07-30 PROCEDURE — 82947 ASSAY GLUCOSE BLOOD QUANT: CPT

## 2021-07-30 PROCEDURE — 2580000003 HC RX 258: Performed by: STUDENT IN AN ORGANIZED HEALTH CARE EDUCATION/TRAINING PROGRAM

## 2021-07-30 PROCEDURE — 6370000000 HC RX 637 (ALT 250 FOR IP)

## 2021-07-30 PROCEDURE — 6360000002 HC RX W HCPCS: Performed by: INTERNAL MEDICINE

## 2021-07-30 PROCEDURE — 2580000003 HC RX 258: Performed by: CLINICAL NURSE SPECIALIST

## 2021-07-30 PROCEDURE — 97110 THERAPEUTIC EXERCISES: CPT

## 2021-07-30 PROCEDURE — 80048 BASIC METABOLIC PNL TOTAL CA: CPT

## 2021-07-30 PROCEDURE — 97535 SELF CARE MNGMENT TRAINING: CPT

## 2021-07-30 PROCEDURE — 2580000003 HC RX 258: Performed by: INTERNAL MEDICINE

## 2021-07-30 PROCEDURE — 85055 RETICULATED PLATELET ASSAY: CPT

## 2021-07-30 PROCEDURE — 82140 ASSAY OF AMMONIA: CPT

## 2021-07-30 PROCEDURE — 94761 N-INVAS EAR/PLS OXIMETRY MLT: CPT

## 2021-07-30 PROCEDURE — 6370000000 HC RX 637 (ALT 250 FOR IP): Performed by: CLINICAL NURSE SPECIALIST

## 2021-07-30 PROCEDURE — 95720 EEG PHY/QHP EA INCR W/VEEG: CPT | Performed by: PSYCHIATRY & NEUROLOGY

## 2021-07-30 PROCEDURE — 6360000002 HC RX W HCPCS: Performed by: NURSE PRACTITIONER

## 2021-07-30 PROCEDURE — 95714 VEEG EA 12-26 HR UNMNTR: CPT

## 2021-07-30 PROCEDURE — 85025 COMPLETE CBC W/AUTO DIFF WBC: CPT

## 2021-07-30 PROCEDURE — 99232 SBSQ HOSP IP/OBS MODERATE 35: CPT | Performed by: INTERNAL MEDICINE

## 2021-07-30 PROCEDURE — 36415 COLL VENOUS BLD VENIPUNCTURE: CPT

## 2021-07-30 PROCEDURE — 2060000000 HC ICU INTERMEDIATE R&B

## 2021-07-30 PROCEDURE — 99232 SBSQ HOSP IP/OBS MODERATE 35: CPT | Performed by: PSYCHIATRY & NEUROLOGY

## 2021-07-30 RX ORDER — DIVALPROEX SODIUM 500 MG/1
500 TABLET, DELAYED RELEASE ORAL 2 TIMES DAILY
Status: DISCONTINUED | OUTPATIENT
Start: 2021-07-30 | End: 2021-08-03

## 2021-07-30 RX ORDER — LACTULOSE 10 G/15ML
20 SOLUTION ORAL 3 TIMES DAILY
Status: DISCONTINUED | OUTPATIENT
Start: 2021-07-30 | End: 2021-07-30

## 2021-07-30 RX ORDER — LACTULOSE 10 G/15ML
20 SOLUTION ORAL 3 TIMES DAILY
Status: DISCONTINUED | OUTPATIENT
Start: 2021-07-30 | End: 2021-08-03

## 2021-07-30 RX ORDER — INSULIN GLARGINE 100 [IU]/ML
47 INJECTION, SOLUTION SUBCUTANEOUS 2 TIMES DAILY
Status: DISCONTINUED | OUTPATIENT
Start: 2021-07-30 | End: 2021-08-05

## 2021-07-30 RX ADMIN — LACTULOSE 20 G: 20 SOLUTION ORAL at 09:12

## 2021-07-30 RX ADMIN — PANTOPRAZOLE SODIUM 40 MG: 40 TABLET, DELAYED RELEASE ORAL at 09:13

## 2021-07-30 RX ADMIN — INSULIN GLARGINE 47 UNITS: 100 INJECTION, SOLUTION SUBCUTANEOUS at 20:35

## 2021-07-30 RX ADMIN — LACTULOSE 20 G: 20 SOLUTION ORAL at 16:15

## 2021-07-30 RX ADMIN — LACTULOSE 20 G: 20 SOLUTION ORAL at 20:29

## 2021-07-30 RX ADMIN — LEVOCARNITINE 3000 MG: 1 INJECTION, SOLUTION INTRAVENOUS at 16:35

## 2021-07-30 RX ADMIN — ROPINIROLE HYDROCHLORIDE 1 MG: 1 TABLET, FILM COATED ORAL at 09:13

## 2021-07-30 RX ADMIN — METOPROLOL TARTRATE 75 MG: 25 TABLET ORAL at 20:24

## 2021-07-30 RX ADMIN — ENOXAPARIN SODIUM 40 MG: 40 INJECTION SUBCUTANEOUS at 09:12

## 2021-07-30 RX ADMIN — SODIUM CHLORIDE, PRESERVATIVE FREE 10 ML: 5 INJECTION INTRAVENOUS at 09:14

## 2021-07-30 RX ADMIN — Medication 1250 MG: at 04:59

## 2021-07-30 RX ADMIN — Medication 1250 MG: at 20:29

## 2021-07-30 RX ADMIN — LEVOCARNITINE 6000 MG: 1 INJECTION, SOLUTION INTRAVENOUS at 10:33

## 2021-07-30 RX ADMIN — ROPINIROLE HYDROCHLORIDE 1 MG: 1 TABLET, FILM COATED ORAL at 13:34

## 2021-07-30 RX ADMIN — LEVETIRACETAM 2000 MG: 500 TABLET, FILM COATED ORAL at 20:25

## 2021-07-30 RX ADMIN — DULOXETINE HYDROCHLORIDE 60 MG: 30 CAPSULE, DELAYED RELEASE ORAL at 09:13

## 2021-07-30 RX ADMIN — INSULIN LISPRO 6 UNITS: 100 INJECTION, SOLUTION INTRAVENOUS; SUBCUTANEOUS at 16:31

## 2021-07-30 RX ADMIN — LEVETIRACETAM 2000 MG: 500 TABLET, FILM COATED ORAL at 09:13

## 2021-07-30 RX ADMIN — ROPINIROLE HYDROCHLORIDE 1 MG: 1 TABLET, FILM COATED ORAL at 20:24

## 2021-07-30 RX ADMIN — LACOSAMIDE 200 MG: 100 TABLET, FILM COATED ORAL at 09:13

## 2021-07-30 RX ADMIN — METOPROLOL TARTRATE 75 MG: 25 TABLET ORAL at 09:13

## 2021-07-30 RX ADMIN — Medication 1 CAPSULE: at 09:12

## 2021-07-30 RX ADMIN — PANTOPRAZOLE SODIUM 40 MG: 40 TABLET, DELAYED RELEASE ORAL at 20:24

## 2021-07-30 RX ADMIN — DIVALPROEX SODIUM 500 MG: 500 TABLET, DELAYED RELEASE ORAL at 20:25

## 2021-07-30 RX ADMIN — LACOSAMIDE 200 MG: 100 TABLET, FILM COATED ORAL at 20:25

## 2021-07-30 RX ADMIN — DIVALPROEX SODIUM 750 MG: 500 TABLET, DELAYED RELEASE ORAL at 09:14

## 2021-07-30 RX ADMIN — ZONISAMIDE 500 MG: 100 CAPSULE ORAL at 20:27

## 2021-07-30 RX ADMIN — INSULIN LISPRO 2 UNITS: 100 INJECTION, SOLUTION INTRAVENOUS; SUBCUTANEOUS at 20:35

## 2021-07-30 RX ADMIN — Medication 1250 MG: at 13:34

## 2021-07-30 ASSESSMENT — PAIN SCALES - GENERAL
PAINLEVEL_OUTOF10: 0
PAINLEVEL_OUTOF10: 0

## 2021-07-30 NOTE — PLAN OF CARE
Problem: Nutritional:  Goal: Nutritional status will improve  Description: Nutritional status will improve  Outcome: Ongoing     Problem: Physical Regulation:  Goal: Will remain free from infection  Description: Will remain free from infection  Outcome: Ongoing  Goal: Ability to maintain vital signs within normal range will improve  Description: Ability to maintain vital signs within normal range will improve  Outcome: Ongoing  Goal: Signs of adequate cerebral perfusion will increase  Description: Signs of adequate cerebral perfusion will increase  Outcome: Ongoing  Goal: Ability to maintain a stable neurologic state will improve  Description: Ability to maintain a stable neurologic state will improve  Outcome: Ongoing     Problem: Skin Integrity:  Goal: Demonstration of wound healing without infection will improve  Description: Demonstration of wound healing without infection will improve  Outcome: Ongoing  Goal: Will show no infection signs and symptoms  Description: Will show no infection signs and symptoms  Outcome: Ongoing  Goal: Absence of new skin breakdown  Description: Absence of new skin breakdown  Outcome: Ongoing     Problem: Coping:  Goal: Ability to cope will improve  Description: Ability to cope will improve  Outcome: Ongoing  Goal: Ability to identify appropriate support needs will improve  Description: Ability to identify appropriate support needs will improve  Outcome: Ongoing     Problem: Health Behavior:  Goal: Ability to manage health-related needs will improve  Description: Ability to manage health-related needs will improve  Outcome: Ongoing     Problem: Safety:  Goal: Ability to remain free from injury will improve  Description: Ability to remain free from injury will improve  Outcome: Ongoing     Problem: Self-Concept:  Goal: Level of anxiety will decrease  Description: Level of anxiety will decrease  Outcome: Ongoing  Goal: Ability to verbalize feelings about condition will

## 2021-07-30 NOTE — PLAN OF CARE
Problem: Nutritional:  Goal: Nutritional status will improve  Description: Nutritional status will improve  Outcome: Ongoing     Problem: Physical Regulation:  Goal: Will remain free from infection  Description: Will remain free from infection  Outcome: Met This Shift  Goal: Ability to maintain vital signs within normal range will improve  Description: Ability to maintain vital signs within normal range will improve  Outcome: Met This Shift  Goal: Signs of adequate cerebral perfusion will increase  Description: Signs of adequate cerebral perfusion will increase  Outcome: Met This Shift  Goal: Ability to maintain a stable neurologic state will improve  Description: Ability to maintain a stable neurologic state will improve  Outcome: Ongoing     Problem: Skin Integrity:  Goal: Demonstration of wound healing without infection will improve  Description: Demonstration of wound healing without infection will improve  Outcome: Met This Shift  Goal: Will show no infection signs and symptoms  Description: Will show no infection signs and symptoms  Outcome: Met This Shift  Goal: Absence of new skin breakdown  Description: Absence of new skin breakdown  Outcome: Met This Shift     Problem: Coping:  Goal: Ability to cope will improve  Description: Ability to cope will improve  Outcome: Met This Shift  Goal: Ability to identify appropriate support needs will improve  Description: Ability to identify appropriate support needs will improve  Outcome: Met This Shift     Problem: Health Behavior:  Goal: Ability to manage health-related needs will improve  Description: Ability to manage health-related needs will improve  Outcome: Met This Shift     Problem: Safety:  Goal: Ability to remain free from injury will improve  Description: Ability to remain free from injury will improve  Outcome: Met This Shift     Problem: Self-Concept:  Goal: Level of anxiety will decrease  Description: Level of anxiety will decrease  Outcome: Ongoing  Goal: Ability to verbalize feelings about condition will improve  Description: Ability to verbalize feelings about condition will improve  Outcome: Ongoing     Problem: Falls - Risk of:  Goal: Will remain free from falls  Description: Will remain free from falls  Outcome: Met This Shift  Goal: Absence of physical injury  Description: Absence of physical injury  Outcome: Met This Shift     Problem: Musculor/Skeletal Functional Status  Goal: Highest potential functional level  Outcome: Met This Shift     Problem: Pain:  Goal: Pain level will decrease  Description: Pain level will decrease  Outcome: Met This Shift  Goal: Control of acute pain  Description: Control of acute pain  Outcome: Met This Shift  Goal: Control of chronic pain  Description: Control of chronic pain  Outcome: Met This Shift

## 2021-07-30 NOTE — PROGRESS NOTES
Physical Therapy  Facility/Department: [address] STEPDOWN  Daily Treatment Note  NAME: Odessa Delacruz  : 1970  MRN: [id number]    Date of Service: 2021    Discharge Recommendations: Further therapy recommended at discharge. Patient would benefit from continued therapy after discharge        Assessment   Body structures, Functions, Activity limitations: Decreased functional mobility ; Decreased endurance;Decreased strength  Assessment: Pt amelie ~5 standing with RW Ava. Pt noncompliant with  NWB restrictions on LLE limiting activities at this date. Pt continues to demo cervical flexion, requires max cueing to correct, with fair return. Pt will benefit from continued therapy to address deficits. Prognosis: Good  Decision Making: Medium Complexity  REQUIRES PT FOLLOW UP: Yes  Activity Tolerance  Activity Tolerance: Patient limited by fatigue;Patient limited by endurance     Patient Diagnosis(es): There were no encounter diagnoses. has a past medical history of Abnormal thyroid biopsy, Acid reflux, Anemia, Anesthesia, Bipolar disorder ([address] Blood clot in vein, Cancer ([address] Chest pain, Chronic back pain, Chronic bronchitis ([address] Colon polyp, Depression, DVT (deep venous thrombosis) ([address] Esophageal abnormality, Fatty liver disease, nonalcoholic, Furuncle, History of Doppler ultrasound, History of pulmonary embolism, Hx of blood clots, Hyperlipidemia, Hypertension, Intracranial arachnoid cyst, Irritable bowel syndrome, Liver disease, MDRO (multiple drug resistant organisms) resistance, MRSA (methicillin resistant staph aureus) culture positive, Nephrolithiasis, Neuropathy, Pancreatic insufficiency, Positive SANDY (antinuclear antibody), Prolonged emergence from general anesthesia, Restless legs syndrome, Seizures ([address] Sinus tachycardia, Skull fracture ([address] Sleep apnea, Systolic dysfunction, and Type II or unspecified type diabetes mellitus without mention of complication, not stated as uncontrolled. has a past surgical history that includes knee surgery (Left, 2007); Vena Cava Filter Placement (2007); hernia repair (Right, 1996); Cholecystectomy (2004); Upper gastrointestinal endoscopy (2012); transthoracic echocardiogram (3-05-11); other surgical history (5-31-11); Cardiac catheterization; Cardiac catheterization (3-2012); craniotomy (11/2012); Tonsillectomy; Endoscopy, colon, diagnostic; EKG 12 Lead (10/18/2015); Knee arthroscopy (Right, 2016); Cardiac catheterization (04/28/2016); Upper gastrointestinal endoscopy (2016); Inguinal hernia repair (Right, 09/15/2016); Colonoscopy (2012); Colonoscopy (08/2016); other surgical history (01/20/2017); shoulder surgery (Right, 01/2007); Carpal tunnel release (01/2017); Upper gastrointestinal endoscopy (Left, 10/22/2019); Upper gastrointestinal endoscopy (Left, 10/22/2019); and Thyroid lobectomy (N/A, 1/15/2021). Restrictions  Restrictions/Precautions  Restrictions/Precautions: Weight Bearing  Required Braces or Orthoses?: No  Lower Extremity Weight Bearing Restrictions  Right Lower Extremity Weight Bearing: Weight Bearing As Tolerated  Left Lower Extremity Weight Bearing: Non Weight Bearing  Position Activity Restriction  Other position/activity restrictions: Up with assist  Subjective   General  Chart Reviewed: Yes  Response To Previous Treatment: Not applicable  Family / Caregiver Present: Yes (wife)  Subjective  Subjective: RN and pt agreeable to PT. Pt EOB upon arrivial with OT. Pt has difficulty with following commands requires increased time to complete simply commands.   Pain Screening  Patient Currently in Pain: Denies  Vital Signs  Patient Currently in Pain: Denies       Orientation  Orientation  Overall Orientation Status: Within Functional Limits  Cognition      Objective   Bed mobility  Supine to Sit: Contact guard assistance  Sit to Supine: Contact guard assistance  Scooting: Contact guard assistance  Transfers  Sit to Stand: Minimal Assistance  Stand to sit: Minimal Assistance  Comment: pt very impulsive, requiring max cueing for safety and redirection. Ambulation  Ambulation?: No (pt noncompliant with NWB status). Balance  Posture: Fair  Sitting - Static: Fair  Sitting - Dynamic: Fair  Standing - Static: Fair  Standing - Dynamic: Fair   Exercises: Seated LE exercise program: Long Arc Quads, hip abduction/adduction, heel/toe raises, and marches. Reps: 10x        AM-PAC Score  AM-PAC Inpatient Mobility Raw Score : 16 (07/29/21 1454)  AM-PAC Inpatient T-Scale Score : 40.78 (07/29/21 1454)  Mobility Inpatient CMS 0-100% Score: 54.16 (07/29/21 1454)  Mobility Inpatient CMS G-Code Modifier : CK (07/29/21 1454)          Goals  Short term goals  Time Frame for Short term goals: 10  visits  Short term goal 1: transfers with SBA  Short term goal 2: amb 25 ft with a RW x CCGA with NWB L LE WBAT R LE  Short term goal 3: to be independent with bed mobility  Short term goal 4: 20 min exercise program x SBA  Patient Goals   Patient goals : Return home    Plan    Plan  Times per week: 5-6x wk  Times per day: Daily  Current Treatment Recommendations: Strengthening, Functional Mobility Training, Transfer Training, Gait Training, Endurance Training, Pain Management, Safety Education & Training  Safety Devices  Type of devices: Nurse notified, Left in bed, Call light within reach  Restraints  Initially in place: No     Therapy Time   Individual Concurrent Group Co-treatment   Time In 1458         Time Out 1510         Minutes 12         Timed Code Treatment Minutes: 12 Minutes (co-treat with OT)    Treatment performed by Student PTA under the supervision of co-signing PTA who agrees with all treatment and documentation. Mitch Morrell PTA. Kendall GOMEZA.

## 2021-07-30 NOTE — PROGRESS NOTES
[facility] - Progress Note  Today's Date and Time: 7/30/2021, 10:27 AM    Impression :   · MRSA right leg ulcerations  · Left heel wound  · Breakthrough seizure with history of epilepsy  · Diabetes mellitus type 2  · Essential hypertension  · Hyperammonemia  · Altered mentation    Recommendations:   · Vancomycin may be continued for now as patient may not be safe to take oral medication at this time. Monitor renal function closely  · Podiatry consult  · Wound care  · Speech evaluation with swallow study  · GI consult for hyperammonemia    Medical Decision Making/Summary/Discussion:7/30/2021     ·   Infection Control Recommendations   · Myrtle Precautions  · Contact Isolation     Antimicrobial Stewardship Recommendations     · Simplification of therapy  · Targeted therapy    Coordination of Outpatient Care:   · Estimated Length of IV antimicrobials: TBD  · Patient will need Midline Catheter Insertion: No  · Patient will need PICC line Insertion:  · Patient will need: Home IV , [place],  SNF,  LTAC:TBD  · Patient will need outpatient wound care:Yes    Chief complaint/reason for consultation:   · Cellulitis, foot abscess growing MRSA      History of Present Illness:   Geoffrey Gotti is a 46y.o.-year-old male who was initially admitted on 7/27/2021. Patient seen at the request of Dr. Emma Humphreys:    Initially, this patient with a history of complex seizure disorder and diabetes mellitus, presented to [facility] on 7/20/2021 with 2 ulcerations on his right leg and one on his left heel. He states they had first appeared approximately 3 weeks prior, but have gotten worse with foul-smelling discharge and severe pain. Wound cultures of the right leg yielded MRSA.     While at [facility], the patient underwent an I&D of the left heel wound per Podiatry and was treated for the MRSA positive right leg wounds with Vanco  per ID physician,  Dipesh. One out of 2 blood cultures was positive for coag negative staph but this was considered as a contaminant. The patient was doing well, but experienced a change in mentation and an EEG showed abnormal changes. He was going to be transferred to [place], where he follows up with his neurologist, but they had no beds available. He was then transferred to [address] [place] [address] for further neurology work-up. Per Podiatry at [place] [address] on 7/20/21   Vascular: Dorsalis pedis and posterior tibial pulses are palpable to right foot, DP is palpable to left. PT biphasic on doppler exam left. Skin temperature is warm  to cool from proximal tibial tuberosity to distal digits. CFT brisk to exposed digits. Edema noted to left heel and ankle, non-pitting. Hair growth present to bilateral feet. Quality of skin is wnl for patient age.      Dermatologic: Open lesion x2 noted to anterior aspect of right leg. The proximal lesion measures 2cm in diameter and displays a necrotic base with orange exudate present. The more distal lesion measures 3.5 cm in diameter with a necrotic base, serosanquinous and purulent drainage noted. To both lesions, negative probe to bone, negative malodor. Puncture wound with underlying eschar noted beneath superficial skin to left heel, plantar aspect. Post-debridement: left heel expresses 5cc of purulent drainage, probes to calcaneus. Extensive malodor present.      Neurovascular: Light touch sensation grossly absent to bilateral feet, restored at mid calf.      Musculoskeletal: Muscle strength 5/5 for all plantarflexors, dorsiflexors, inverters and everters to RLE, 4/5 for all quadrants LLE. Exquisite pain to palpation of left heel, and left ankle to the medial and posterior aspects. Cavus foot type noted bilateral. Semi-rigid contractures of digits 1-5 bilateral noted.  No amputations noted bilateral.       RLE   7/20/21       LLE post-debridement        7/24/21         CURRENT EVALUATION 7/30/2021    Patient evaluated at bedside at time of rounds. Afebrile  Vital signs stable    Left heel warm to touch, slight drainage negative malodor, no edema   Podiatry asked to evaluate    Right anterior leg lesions without drainage, no edema    On evaluation, the patient is drowsy, follows commands  EEG completed 07/28/21, showed abnormal bihemispheric dysfunction, no active seizures. 07/30/21 Ammonia 142 --> 53  On lactulose and decreased dosages of Depakote      His liver profile from the 20th was normal.    We will continue to follow    Labs, X rays reviewed: 7/30/2021    BUN:8 --> 6 --> 6  Cr:0.63 --> 0.62 -> 0.70  CRP: 3.46     WBC:5.9 --> 5.6 --> 5.9  Hb:13.5 --> 13.9 --> 14.8  Plat: 254 --> 273     Cultures:  Urine:  ·   Blood:  ·   Sputum :  ·   Wound:  · 7/20/2021: MRSA    Images:    7/27/21 7/27/2021    Discussed with patient, RN, family. I have personally reviewed the past medical history, past surgical history, medications, social history, and family history, and I have updated the database accordingly.   Past Medical History:     Past Medical History:   Diagnosis Date    Abnormal thyroid biopsy     s/p left hemithyroidectomy 4/1183 - follicular adenoma    Acid reflux     Anemia     resolved - previously seen by Dr. Edie Dangelo (due to enlarged spleen)    Anesthesia     seizures with brain surgery due to blood sugar got really high    Bipolar disorder ([facility]     Blood clot in vein 04/07/2016    [facility]     Cancer [facility]) 04/2019    thyroid    Chest pain     previously seeing [address] [place] [address] cardiologist, now seeing Dr. Dillon Power (LAD bridging on cath 4/2016)    Chronic back pain     Chronic bronchitis ([facility]     Dr. Yamile Baltazar Colon polyp 08/30/2016    Depression     seeing Dallin Herzog DVT (deep venous thrombosis) ([facility] [id number]    not on Coumadin due to unable to regulate - Dr. René Diallo - no etiology found per patient    Esophageal abnormality     nodule     Fatty liver disease, nonalcoholic     U/S 72/0247 [facility]    Furuncle     legs    History of Doppler ultrasound 05/29/2011    No hemodynamically significant carotid stenosis is identified. A thyroid nodule on each side. Dedicated ultrasound of thyroid gland is suggested to further evaluate if clinically indicated.  History of pulmonary embolism 2007    s/p GFF, related to knee surgery    Hx of blood clots     leg and lung    Hyperlipidemia     severely elevated triglycerides    Hypertension     diastolic    Intracranial arachnoid cyst 11/2012    Dr. Sylvia Estrada drained, complicated with seizures, DKA    Irritable bowel syndrome     Liver disease     Andrew Hernandez - elevated LFT - positive smooth muscle antibody, steatosis per liver bx 3/2014 with Dr. Chirag Felix (multiple drug resistant organisms) resistance 2012    MRSA (methicillin resistant staph aureus) culture positive     h/o in foot and before brain surgery    Nephrolithiasis     noted on CT abdomen 9/2016, 6/2019    Neuropathy     Pancreatic insufficiency     Positive SANDY (antinuclear antibody)     Dr. Eloy Ewing - first visit in 6/2013    Prolonged emergence from general anesthesia     Restless legs syndrome     Seizures ([address]     started with brain surgery    Sinus tachycardia     Holter 3/2013 - seeing Dr. Jami Zheng fracture [facility])     clips     Sleep apnea     positive sleep apnea per study 10/2020.     Systolic dysfunction     \"systolic bridge\"  EF 46% ECHO 9/2019    Type II or unspecified type diabetes mellitus without mention of complication, not stated as uncontrolled 2007       Past Surgical  History:     Past Surgical History:   Procedure Laterality Date    CARDIAC CATHETERIZATION      2011,5-6 years ago   [address]  3-2012    CARDIAC CATHETERIZATION  04/28/2016    Psychiatric     CARPAL TUNNEL RELEASE  01/2017    CHOLECYSTECTOMY  2004    COLONOSCOPY  2012    COLONOSCOPY  08/2016    2 tubular adenomas - reportedly needs repeat scope in 6 months    CRANIOTOMY  11/2012    arachnoid cyst drainage    EKG 12-LEAD  10/18/2015         ENDOSCOPY, COLON, DIAGNOSTIC      HERNIA REPAIR Right 1996    [facility]Dr. Emil Nix    INGUINAL HERNIA REPAIR Right 09/15/2016    Robotic assisted    KNEE ARTHROSCOPY Right 2016    KNEE SURGERY Left 2007    acl and debrided twice    OTHER SURGICAL HISTORY  5-31-11    Tilt table was associated w/ nonspecific symptoms or nausea, otherwise no significant dizziness or syncope. No significant hemodynamic changes. Otherwise, unremarkable tilt table test after 30 minutes of tilting.  OTHER SURGICAL HISTORY  01/20/2017    RIGHT SHOULDER ARTHROSCOPY, OPEN STAN, OPEN ACROMIOPLASTY, BICEP TENDESIS, RIGHT CARPAL TUNNEL RELEASE    SHOULDER SURGERY Right 01/2007    THYROID LOBECTOMY N/A 1/15/2021    THYROID LOBECTOMY, UVULOPALATOPHARYNGOPLAST LAOP performed by Alfie Gerardo MD at [facility] ECHOCARDIOGRAM  3-05-11    LV size and systolic function normal. EF 55-65%.      UPPER GASTROINTESTINAL ENDOSCOPY  2012    UPPER GASTROINTESTINAL ENDOSCOPY  2016    Dr. Yissel Nogueira Left 10/22/2019    EGD DILATION SAVORY performed by Mago Suarez MD [facility]    UPPER GASTROINTESTINAL ENDOSCOPY Left 10/22/2019    EGD BIOPSY performed by Mago Suarez MD at [address]  2007       Medications:      lactulose  20 g Oral TID    levocarnitine IVPB  6,000 mg Intravenous Once    Followed by    levocarnitine IVPB  3,000 mg Intravenous Q8H    insulin glargine  45 Units Subcutaneous BID    vancomycin (VANCOCIN) intermittent dosing (placeholder)   Other RX Placeholder    divalproex  750 mg Oral BID    zonisamide  500 mg Oral Nightly    DULoxetine  60 mg Oral Daily    pantoprazole  40 mg Oral BID    levETIRAcetam  2,000 mg Oral BID    metoprolol tartrate  75 mg Oral BID    rOPINIRole  1 mg Oral TID    insulin lispro 0-12 Units Subcutaneous TID WC    insulin lispro  0-6 Units Subcutaneous Nightly    lacosamide  200 mg Oral BID    lactobacillus  1 capsule Oral Daily    sodium chloride flush  5-40 mL Intravenous 2 times per day    vancomycin  1,250 mg Intravenous Q8H    enoxaparin  40 mg Subcutaneous Daily       Social History:     Social History     Socioeconomic History    Marital status:      Spouse name: Not on file    Number of children: 3    Years of education: Not on file    Highest education level: Not on file   Occupational History    Occupation: Disability since 2011   Tobacco Use    Smoking status: Never Smoker    Smokeless tobacco: Never Used   Vaping Use    Vaping Use: Never used   Substance and Sexual Activity    Alcohol use: No     Alcohol/week: 0.0 standard drinks    Drug use: No    Sexual activity: Not on file   Other Topics Concern    Not on file   Social History Narrative    Not on file     Social Determinants of Health     Financial Resource Strain: Medium Risk    Difficulty of Paying Living Expenses: Somewhat hard   Food Insecurity: No Food Insecurity    Worried About Running Out of Food in the Last Year: Never true    Jeffry of Food in the Last Year: Never true   Transportation Needs:     Lack of Transportation (Medical):      Lack of Transportation (Non-Medical):    Physical Activity:     Days of Exercise per Week:     Minutes of Exercise per Session:    Stress:     Feeling of Stress :    Social Connections:     Frequency of Communication with Friends and Family:     Frequency of Social Gatherings with Friends and Family:     Attends Confucianist Services:     Active Member of Clubs or Organizations:     Attends Club or Organization Meetings:     Marital Status:    Intimate Partner Violence:     Fear of Current or Ex-Partner:     Emotionally Abused:     Physically Abused:     Sexually Abused:        Family History:     Family History   Problem Relation Age of Onset  Heart Disease Father 61        MI    Stroke Brother     Obesity Brother     Arthritis Mother     Seizures Mother     Obesity Sister     Other Brother         pulmonary embolism    Diabetes Daughter     Seizures Brother         Allergies:   Ciprofloxacin-ciproflox hcl er, Heparin, Metformin, Niacin and related, Tramadol hcl, Ultram [tramadol], and Warfarin     Review of Systems:   Unable to assess as patient is nonverbal at this time    Constitutional: No fevers or chills. No systemic complaints  Head: No headaches  Eyes: No double vision or blurry vision. No conjunctival inflammation. ENT: No sore throat or runny nose. . No hearing loss, tinnitus or vertigo. Cardiovascular: No chest pain or palpitations. No shortness of breath. No EWING  Lung: No shortness of breath or cough. No sputum production  Abdomen: No nausea, vomiting, diarrhea, or abdominal pain. Pamalee Bucker No cramps. Genitourinary: No increased urinary frequency, or dysuria. No hematuria. No suprapubic or CVA pain  Musculoskeletal: No muscle aches or pains. No joint effusions, swelling or deformities  Hematologic: No bleeding or bruising. Neurologic: No headache, weakness, numbness, or tingling. Integument: No rash, no ulcers. Psychiatric: No depression. Endocrine: No polyuria, no polydipsia, no polyphagia. Physical Examination :     Patient Vitals for the past 8 hrs:   BP Temp Temp src Pulse Resp SpO2 Weight   07/30/21 0913 (!) 152/90 -- -- 108 -- -- --   07/30/21 0400 131/77 98.4 °F (36.9 °C) Axillary 56 15 95 % --   07/30/21 0338 -- -- -- -- -- -- 252 lb (114.3 kg)     General Appearance: Awake, alert, and in no apparent distress-nonverbal  Head:  Normocephalic, no trauma  Eyes: Pupils equal, round, reactive to light and accommodation; extraocular movements intact; sclera anicteric; conjunctivae pink. No embolic phenomena. ENT: Oropharynx clear, without erythema, exudate, or thrush. No tenderness of sinuses.  Mouth/throat: mucosa pink and moist. No lesions. Dentition in good repair. Neck:Supple, without lymphadenopathy. Thyroid normal, No bruits. Pulmonary/Chest: Clear to auscultation, without wheezes, rales, or rhonchi. No dullness to percussion. Cardiovascular: Regular rate and rhythm without murmurs, rubs, or gallops. Abdomen: Soft, non tender. Bowel sounds normal. No organomegaly  All four Extremities: No cyanosis, clubbing, edema, or effusions. Neurologic: No gross sensory or motor deficits. Skin: 2 healing ulcerations on the right lower leg and 1 left foot wound status post I&D sutures clean dry intact  Medical Decision Making -Laboratory:   I have independently reviewed/ordered the following labs:    CBC with Differential:   Recent Labs     07/29/21  0516 07/30/21  0544   WBC 5.6 5.9   HGB 13.9 14.8   HCT 40.1* 41.9    See Reflexed IPF Result   LYMPHOPCT 28 33   MONOPCT 10 9     BMP:   Recent Labs     07/29/21  0516 07/30/21  0851    138   K 3.8 3.9    106   CO2 18* 22   BUN 6 6   CREATININE 0.62* 0.70   MG 1.9  --      Hepatic Function Panel:   Recent Labs     07/28/21  1447 07/29/21  1158   PROT 6.8 7.1   LABALBU 3.4*  3.4* 3.6   BILIDIR 0.16 0.15   IBILI 0.37 0.46   BILITOT 0.53 0.61   ALKPHOS 160* 156*   ALT 53* 46*   AST 35 22     No results for input(s): RPR in the last 72 hours. No results for input(s): HIV in the last 72 hours. No results for input(s): BC in the last 72 hours. Lab Results   Component Value Date    MUCUS Present 10/17/2015    PH 7.40 06/26/2020    PH 5.5 01/04/2012    RBC 4.93 07/30/2021    RBC 4.93 03/26/2012    WBC 5.9 07/30/2021    YEAST NONE SEEN 08/12/2019     Lab Results   Component Value Date    CREATININE 0.70 07/30/2021    GLUCOSE 182 07/30/2021    GLUCOSE 189 11/12/2015       Medical Decision Making-Imaging:   XR FOOT LEFT (MIN 3 VIEWS)     Result Date: 7/20/2021  PROCEDURE: XR FOOT LEFT (MIN 3 VIEWS) CLINICAL INFORMATION: pain COMPARISON: No prior study.  TECHNIQUE: 4 views of the foot were obtained. FINDINGS: No acute fracture or dislocation is seen. There is no soft tissue swelling. There is a moderate-sized plantar calcaneal spur.      Plantar calcaneal spur. Otherwise normal left foot. **This report has been created using voice recognition software. It may contain minor errors which are inherent in voice recognition technology. ** Final report electronically signed by Dr. Arnold Guzman on 7/20/2021 3:57 PM     CT TIBIA FIBULA LEFT WO CONTRAST     Result Date: 7/20/2021  PROCEDURE: NONCONTRAST CT LEFT TIBIA/FIBULA: CLINICAL INFORMATION: Infection/ abscess TECHNIQUE: Multiple axial 3 mm images of the left tibia/fibula were obtained without administration of intravenous contrast material. Computer generated sagittal and coronal images of the left tibia/fibula were also reconstructed. ALL CT SCANS AT THIS FACILITY use dose modulation, iterative reconstruction, and/or weight-based dosing when appropriate to reduce radiation dose to as low as reasonably achievable. FINDINGS: There are bony defects in the distal femur and proximal tibia conjunction with prior anterior crucial ligament repair. No acute fracture is seen. Appears be a small bone cyst in the lateral malleolus. There is mild degenerative spurring of the distal femur. Note that there appear to be multiple superficial varicosities in the distal left calf      1. Degenerative changes of the left knee. Postsurgical changes left knee. Multiple varicosities this left calf. 2. Study otherwise unremarkable. No tibial or fibular fracture is seen. **This report has been created using voice recognition software. It may contain minor errors which are inherent in voice recognition technology. ** Final report electronically signed by Dr. Arnold Guzman on 7/20/2021 9:07 PM       Medical Decision [facility]:     7/23/2021  2:48 PM - David Mendez Incoming Lab Results From [facility]    Specimen Information: Heel        Component Collected Lab Anaerobic Culture 07/20/2021  6:15  [facility]   Culture yielded heavy mixed growth which included anaerobic gram negative bacilli and anaerobic gram positive cocci. If a true mixed aerobic and anaerobic infection is suspected, then broad spectrum empiric antibiotic therapy is indicated and should include coverage for anaerobic organisms. Gram Stain Result 07/20/2021  6:15  [facility]   Few segmented neutrophils observed. Rare epithelial cells observed. Many gram positive cocci occurring singly and in pairs. Organism Abnormal  07/20/2021  6:15  [facility]   Staphylococcus aureus    Aerobic Culture 07/20/2021  6:15  [facility]   light growth This is a MRSA (Methicillin Resistant Staphylococcus aureus)isolate. Isolates of MRSA (ORSA) Methicillin (Oxacillin) Resistant Staphylococcus aureus (coagulase positive) require patient be placed in CONTACT isolation. Methicillin(Oxacillin)resistant strains of staphylococci (MRSA)or(MRSE)should be considered resistant to all classes of cephalosporins, penems and beta-lactams. In the treatment of gram positive infections, GENTAMICIN should be CONSIDERED a SYNERGYSTIC agent ONLY. Ciprofloxacin and Levofloxacin, regardless of in vitro sensitivity, should not be used for staphylococcal infections other than uncomplicated lower UTIs. Testing Performed By    [address] Name Director Address Valid Date Range   031-GD - [id number] Ryder Cabezas MD [address] [place] [id number] 08/30/17 0855-Present   Narrative  Performed by: [facility]  Source: heel       Site: swab + tissue left          Current Antibiotics: Vancomycin   Susceptibility    Staphylococcus aureus (1)    Antibiotic Interpretation JODI Status   gentamicin Sensitive <=0.5 mcg/mL Final   ICR (D test) Negative Neg  mcg/mL Final    (Dtest) ICR- inducible clinda resistance    If +, then inducible erm gene       present. Clindamycin may be       effective in some patients.       oxacillin Resistant >=4 mcg/mL Final   clindamycin Sensitive <=0.25 mcg/mL Final   trimethoprim-sulfamethoxazole Sensitive <=10 mcg/mL Final   vancomycin Sensitive <=0.5 mcg/mL Final   tetracycline Sensitive <=1 mcg/mL Final   Lab and Collection    Culture, Anaerobic and Aerobic - 7/20/2021  Result History    Culture, Anaerobic and Aerobic on 7/23/2021 - Result Edited       Medical Decision Making-Other:     Note:  · Labs, medications, radiologic studies were reviewed with personal review of films  · Large amounts of data were reviewed  · Discussed with nursing Staff, Discharge planner  · Infection Control and Prevention measures reviewed  · All prior entries were reviewed  · Administer medications as ordered  · Prognosis: Fair  · Discharge planning reviewed    Thank you for allowing us to participate in the care of this patient. Please call with questions. Violet Jesse participated in the evaluation of this patient under my direct supervision. Aamir Crawford MD    ATTESTATION:    I have discussed the case, including pertinent history and exam findings with the residents and students. I have seen and examined the patient and the key elements of the encounter have been performed by me. I was present when the student obtained his information or examined the patient. I have reviewed the laboratory data, other diagnostic studies and discussed them with the residents. I have updated the medical record where necessary. I agree with the assessment, plan and orders as documented by the resident/ student.     Aamir Crawford MD.

## 2021-07-30 NOTE — PROGRESS NOTES
[facility]  Office: [address], DO, Jaycob Jose, DO, Raul Kathleen, DO, Alin Varela Blood, DO, Krystyna Delacruz MD, Jabier Mesa MD, Rell Navarro MD, Tru Spann MD, Nicolette Aguirre MD, Clinton Durán MD, Liliya Spencer MD, Chris Klein, DO, Lisa Andrea MD, Avvia Lu DO, Rogelio Heredia MD,  Rodolfo Valdez DO, Nataliya Jenkins MD, Andrew Dee MD, Alayna Wilkesron MD, Paige Morales MD, Layla Car MD, Cipriano Owen MD, Josue Vega, [facility], [facility] Alysha, CNP, Siobhan Danielson, CNP, James Dee, [facility], Kevin Kaylie, Alden Damon, CNP, Rakan Marks, CNP, Radha Lofton, CNP, Laron Christianson, CNP, Catheryn Leventhal, PA-C, Desire Russo, [facility], Zenaida Barry, CNP, Kayley Baig, CNP, Sandra Delacruz, CNP, Marie Holcomb, CNP, Luis Patiño, CNP, Jose Cartwright, CNP, Balbina Fontanez, [facility], [facility], [address] [place] [address]    Progress Note    7/30/2021    2:34 PM    Name:   Ry Driscoll  MRN:     [id number]     [place]:      [de-identified]   Room:   [address] [place] [address] Day:  3  Admit Date:  7/27/2021  3:42 PM    PCP:   Ramonita Crystal MD  Code Status:  Full Code    Subjective:     C/C: seizure  Interval History Status: not changed    Patient seen and examined at bedside. Not changed. This morning's ammonia level elevated to 142 but only 53 on repeat. Continuous EEG shows diffuse generalized disturbance in electrocortical function. There may be a toxic metabolic component given elevated ammonia levels and valproic acid usage. Neurology did decrease dose of valproic acid today. Patient still appears confused and is oriented to only person    Brief History: This is a 54-year-old male who was transferred here from [facility] for LTM E. He initially presented to the hospital for treatment of right lower extremity cellulitis.   He was evaluated by podiatry and started on vancomycin and Zosyn and was transition to Vancomycin after cultures positive for MRSA. It was recommended he be discharged from OSH with bactrim. Otherwise, currently being worked up for altered mental status. Found to have elevated ammonia due to depakote. Also there was concern for repeat seizure activity so pt was placed back on LTME 7/30    Review of Systems:     Pt not a great historian but denies     Constitutional:  negative for chills, fevers, sweats  Respiratory:  negative for cough, dyspnea on exertion, shortness of breath, wheezing  Cardiovascular:  negative for chest pain, chest pressure/discomfort, lower extremity edema, palpitations  Gastrointestinal:  negative for abdominal pain, constipation, diarrhea, nausea, vomiting  Neurological:  negative for dizziness, headache    Medications: Allergies:     Allergies   Allergen Reactions    Ciprofloxacin-Ciproflox Hcl Er Other (See Comments)     Elevated creatinine    Heparin      Monitor for thrombocytopenia    Metformin Nausea Only     Abdominal pain, diarrhea    Niacin And Related Other (See Comments)     Burning sensation, severe flushing    Tramadol Hcl      Contraindication to seizure medications    Ultram [Tramadol]      Contraindication to seizure medications    Warfarin      Very difficult to regulate in past       Current Meds:   Scheduled Meds:    lactulose  20 g Oral TID    levocarnitine IVPB  3,000 mg Intravenous Q8H    divalproex  500 mg Oral BID    insulin glargine  45 Units Subcutaneous BID    vancomycin (VANCOCIN) intermittent dosing (placeholder)   Other RX Placeholder    zonisamide  500 mg Oral Nightly    DULoxetine  60 mg Oral Daily    pantoprazole  40 mg Oral BID    levETIRAcetam  2,000 mg Oral BID    metoprolol tartrate  75 mg Oral BID    rOPINIRole  1 mg Oral TID    insulin lispro  0-12 Units Subcutaneous TID WC    insulin lispro  0-6 Units Subcutaneous Nightly    lacosamide  200 mg Oral BID    lactobacillus  1 capsule Oral Daily    sodium chloride flush  5-40 mL Intravenous 2 times per day    vancomycin  1,250 mg Intravenous Q8H    enoxaparin  40 mg Subcutaneous Daily     Continuous Infusions:    dextrose      sodium chloride       PRN Meds: glucose, dextrose, glucagon (rDNA), dextrose, sodium chloride flush, sodium chloride, potassium chloride **OR** potassium alternative oral replacement **OR** potassium chloride, magnesium sulfate, ondansetron **OR** ondansetron, polyethylene glycol, acetaminophen **OR** acetaminophen    Data:     Past Medical History:   has a past medical history of Abnormal thyroid biopsy, Acid reflux, Anemia, Anesthesia, Bipolar disorder ([address] Blood clot in vein, Cancer ([address] Chest pain, Chronic back pain, Chronic bronchitis ([address] Colon polyp, Depression, DVT (deep venous thrombosis) ([address] Esophageal abnormality, Fatty liver disease, nonalcoholic, Furuncle, History of Doppler ultrasound, History of pulmonary embolism, Hx of blood clots, Hyperlipidemia, Hypertension, Intracranial arachnoid cyst, Irritable bowel syndrome, Liver disease, MDRO (multiple drug resistant organisms) resistance, MRSA (methicillin resistant staph aureus) culture positive, Nephrolithiasis, Neuropathy, Pancreatic insufficiency, Positive SANDY (antinuclear antibody), Prolonged emergence from general anesthesia, Restless legs syndrome, Seizures (HCC), Sinus tachycardia, Skull fracture ([address] Sleep apnea, Systolic dysfunction, and Type II or unspecified type diabetes mellitus without mention of complication, not stated as uncontrolled. Social History:   reports that he has never smoked. He has never used smokeless tobacco. He reports that he does not drink alcohol and does not use drugs.      Family History:   Family History   Problem Relation Age of Onset    Heart Disease Father 61        MI    Stroke Brother     Obesity Brother     Arthritis Mother     Seizures Mother     Obesity Sister     Other Brother         pulmonary embolism    Diabetes Daughter     Seizures Brother        Vitals:  BP (!) 141/78   Pulse 98   Temp 98.2 °F (36.8 °C) (Oral)   Resp 14   Ht 6' 2\" (1.88 m)   Wt 252 lb (114.3 kg)   SpO2 96%   BMI 32.35 kg/m²   Temp (24hrs), Av.4 °F (36.9 °C), Min:98.2 °F (36.8 °C), Max:98.6 °F (37 °C)    Recent Labs     210 21  0544 21  0859 21  1224   POCGLU 159* 183* 161* 211*       I/O (24Hr):     Intake/Output Summary (Last 24 hours) at 2021 1434  Last data filed at 2021 0457  Gross per 24 hour   Intake 970 ml   Output --   Net 970 ml       Labs:  Hematology:  Recent Labs     21  1447 21  0516 21  0544   WBC 5.5 5.6 5.9   RBC 4.53 4.64 4.93   HGB 13.5 13.9 14.8   HCT 38.6* 40.1* 41.9   MCV 85.2 86.4 85.0   MCH 29.8 30.0 30.0   MCHC 35.0* 34.7 35.3*   RDW 11.9 12.0 11.9    273 See Reflexed IPF Result   MPV 9.6 9.8 NOT REPORTED     Chemistry:  Recent Labs     21  1447 21  0516 21  0851    138 138   K 3.8 3.8 3.9    106 106   CO2 20 18* 22   GLUCOSE 193* 191* 182*   BUN 6 6 6   CREATININE 0.63* 0.62* 0.70   MG  --  1.9  --    ANIONGAP 11 14 10   LABGLOM >60 >60 >60   GFRAA >60 >60 >60   CALCIUM 8.5* 8.6 9.1   PHOS 3.1  --   --      Recent Labs     21  1811 21  1940 21  1447 21  1636 21  1052 21  1158 21  1605 21  0544 21  0851 21  0859 21  1224   PROT  --   --  6.8  --   --  7.1  --   --   --   --   --   --    LABALBU  --   --  3.4*  3.4*  --   --  3.6  --   --   --   --   --   --    TSH  --   --  1.72  --   --   --   --   --   --   --   --   --    AST  --   --  35  --   --  22  --   --   --   --   --   --    ALT  --   --  53*  --   --  46*  --   --   --   --   --   --    ALKPHOS  --   --  160*  --   --  156*  --   --   --   --   --   --    BILITOT  --   --  0.53  --   --  0.61  --   --   --   --   --   --    BILIDIR  --   --  0.16  --   --  0.15  --   --   -- --   --   --    AMMONIA 109*  --   --   --   --   --   --   --  142* 53  --   --    POCGLU  --    < >  --    < > 155*  --  179* 159* 183*  --  161* 211*    < > = values in this interval not displayed. ABG:  Lab Results   Component Value Date    PH 7.40 06/26/2020    PH 5.5 01/04/2012    PCO2 40 06/26/2020    PO2 71 06/26/2020    HCO3 25 06/26/2020    O2SAT 94 06/26/2020     No results found for: SPECIAL  No results found for: CULTURE    Radiology:  MRI ANKLE LEFT W WO CONTRAST    Result Date: 7/21/2021   1. Increased signal intensity in the plantar soft tissues over the calcaneus which may represent a clinically described ulcer. No definite evidence of osteomyelitis in the underlying calcaneus. 2. Slightly increased fluid in the distal tibialis posterior tendon and along the peroneus brevis and longus tendon sheaths. 3. Increased signal intensity in the interosseous ligament between the talus and calcaneus which may be torn. 4. Abnormal signal intensity in the muscles along the medial aspect of the hindfoot. This may represent a tear of the abductor hallucis muscle belly. 5. Increased signal intensity in the flexor digitorum brevis muscle belly which may represent a partial tear. 6. Thickening at the attachment of the plantar fascia upon the calcaneus. 7. Small osteochondral defect in the medial talar dome. 8. Areas of abnormal signal intensity in the anterior process of the calcaneus, base of cuboid bone and tip of lateral malleolus suggestive of degenerative changes. 9. Increased signal intensity in the subcutaneous soft tissues over the medial and lateral aspects of the hindfoot consistent with edema and/or cellulitis. 10. Fluid collection along the talonavicular joint. **This report has been created using voice recognition software. It may contain minor errors which are inherent in voice recognition technology. ** Final report electronically signed by DR Rehan Zhang on 7/21/2021 3:25 PM      Physical Examination:        General appearance: comfortable appearing male, appears older than stated age. Responds to commands but is inconsistent. Mental Status:  oriented to  Person but not place or time. Normal affect. He is able to follow commands. Lungs:  clear to auscultation bilaterally, normal effort  Heart:  regular rate and rhythm, no murmur  Abdomen:  soft, nontender, nondistended, normal bowel sounds, no masses, hepatomegaly, splenomegaly  Extremities:  no edema, redness, tenderness in the calves. Right leg ulcerations, has history of MRSA. Skin:  no gross lesions, rashes, induration    Assessment:        Hospital Problems         Last Modified POA    * (Principal) Seizures ([address] 7/27/2021 Yes    Hypertension 7/27/2021 Yes    Type 2 diabetes mellitus with diabetic neuropathy ([address] 7/27/2021 Yes    Neuropathy 7/27/2021 Yes    Bipolar affective disorder ([address] 7/27/2021 Yes    Partial symptomatic epilepsy with complex partial seizures, not intractable, without status epilepticus ([address] 7/27/2021 Yes    Gastroesophageal reflux disease 7/27/2021 Yes    Diabetic leg ulcer ([address] 7/27/2021 Yes    Partial symptomatic epilepsy with complex partial seizures, intractable, without status epilepticus ([address] 7/28/2021 Yes    Episodic confusion 7/28/2021 Yes          Plan:        1. Encephalopathy: Unclear Etiology, likely multifactorial/TME from cellulitis, elevated ammonia. Given levoCarnitine by Neurology. Continue Lactulose. Continue treatment of cellulitis. Neurology plans to put pt back on LTME today 7/30  2. Cellulitis of the right lower leg: Continue IV abx today, per previous ID note will start bactrim on discharge. Packing to left heel changed with Mesalt ribbon, Betadine applied to incision, dressed with gauze 4x4's, kerlix, ACE  3. Diabetes mellitus type II with hyperglycemia: continue home Lantus at lower dose due to decreased apatite and oral intake and bolus insulin. Monitor for signs of hypo/hyperglycemia. =  4. Essential Hypertension: continue home medications. Continue cardiac monitoring. 5. Depression: Continue Cymbalta  6. Obstructive sleep apnea  7. DVT ppx  8.  [address], DO  7/30/2021  2:34 PM

## 2021-07-30 NOTE — PROCEDURES
VIDEO-EEG MONITORING REPORT      Patient Name: Latisha Torres  Dates recorded: 7/30/2021  MRN: [id number]    Electroencephalographer: Jovanna Aaron MD       CLINICAL DETAILS:  This EEG was performed on this 46 y. o.yo male admitted for persistent seizures video study to explore semiology of events      MEDICATIONS:   Current Facility-Administered Medications:     lactulose (CHRONULAC) 10 GM/15ML solution 20 g, 20 g, Oral, TID, [facility] DO, 20 g at 07/30/21 0912    levOCARNitine 6,000 mg in sodium chloride 0.9 % 250 mL IVPB, 6,000 mg, Intravenous, Once **FOLLOWED BY** levOCARNitine 3,000 mg in sodium chloride 0.9 % 250 mL IVPB, 3,000 mg, Intravenous, Q8H, Destini Snyder MD    insulin glargine (LANTUS) injection vial 45 Units, 45 Units, Subcutaneous, BID, [facility], , 45 Units at 07/29/21 0914    vancomycin (VANCOCIN) intermittent dosing (placeholder), , Other, RX Placeholder, [facility], DO    divalproex (DEPAKOTE) DR tablet 750 mg, 750 mg, Oral, BID, Teodoro Cooper MD, 750 mg at 07/30/21 0914    zonisamide (ZONEGRAN) capsule 500 mg, 500 mg, Oral, Nightly, [place], APRN - CNS, 500 mg at 07/29/21 2052    DULoxetine (CYMBALTA) extended release capsule 60 mg, 60 mg, Oral, Daily, [place] Yessi, APRN - CNS, 60 mg at 07/30/21 0913    pantoprazole (PROTONIX) tablet 40 mg, 40 mg, Oral, BID, [place] Yessi, APRN - CNS, 40 mg at 07/30/21 0913    levETIRAcetam (KEPPRA) tablet 2,000 mg, 2,000 mg, Oral, BID, [place] Yessi, APRN - CNS, 2,000 mg at 07/30/21 0913    metoprolol tartrate (LOPRESSOR) tablet 75 mg, 75 mg, Oral, BID, [place] Yessi, APRN - CNS, 75 mg at 07/30/21 0913    rOPINIRole (REQUIP) tablet 1 mg, 1 mg, Oral, TID, NICANOR Shay, 1 mg at 07/30/21 0913    insulin lispro (HUMALOG) injection vial 0-12 Units, 0-12 Units, Subcutaneous, TID WC, NICANOR Shay, 2 Units at 07/29/21 1705    insulin lispro (HUMALOG) injection vial 0-6 Units, 0-6 Units, Subcutaneous, Nightly, Marya Sicilian, APRN - CNS, 1 Units at 07/29/21 2056    glucose (GLUTOSE) 40 % oral gel 15 g, 15 g, Oral, PRN, Marya Sicilian, APRN - CNS    dextrose 50 % IV solution, 12.5 g, Intravenous, PRN, Marya Sicilian, APRN - CNS    glucagon (rDNA) injection 1 mg, 1 mg, Intramuscular, PRN, Marya Sicilian, APRN - CNS    dextrose 5 % solution, 100 mL/hr, Intravenous, PRN, Marya Sicilian, APRN - CNS    lacosamide (VIMPAT) tablet 200 mg, 200 mg, Oral, BID, [facility], APR[facility] - CNS, 200 mg at 07/30/21 0913    lactobacillus (CULTURELLE) capsule 1 capsule, 1 capsule, Oral, Daily, [facility], APRN - CNS, 1 capsule at 07/30/21 0912    sodium chloride flush 0.9 % injection 5-40 mL, 5-40 mL, Intravenous, 2 times per day, [facility], APRN - CNS, 10 mL at 07/30/21 3553    sodium chloride flush 0.9 % injection 10 mL, 10 mL, Intravenous, PRN, Marya Sicilian, APRN - CNS    0.9 % sodium chloride infusion, 25 mL, Intravenous, PRN, Marya Sicilian, APRN - CNS    potassium chloride (KLOR-CON M) extended release tablet 40 mEq, 40 mEq, Oral, PRN **OR** potassium bicarb-citric acid (EFFER-K) effervescent tablet 40 mEq, 40 mEq, Oral, PRN **OR** potassium chloride 10 mEq/100 mL IVPB (Peripheral Line), 10 mEq, Intravenous, PRN, Marya Sicilian, APRN - CNS    magnesium sulfate 1000 mg in dextrose 5% 100 mL IVPB, 1,000 mg, Intravenous, PRN, Marya Sicilian, APRN - CNS    ondansetron (ZOFRAN-ODT) disintegrating tablet 4 mg, 4 mg, Oral, Q8H PRN **OR** ondansetron (ZOFRAN) injection 4 mg, 4 mg, Intravenous, Q6H PRN, Marya Sicilian, APRN - CNS    polyethylene glycol (GLYCOLAX) packet 17 g, 17 g, Oral, Daily PRN, NICANOR Cruz - CNS    acetaminophen (TYLENOL) tablet 650 mg, 650 mg, Oral, Q6H PRN **OR** acetaminophen (TYLENOL) suppository 650 mg, 650 mg, Rectal, Q6H PRN, NICANOR Cruz - CNS    vancomycin (VANCOCIN) 1250 mg in dextrose 5 % 250 mL IVPB, 1,250 mg, Intravenous, Q8H, Jossy Díaz DO, Stopped at 07/30/21 6216    enoxaparin (LOVENOX) injection 40 mg, 40 mg, Subcutaneous, Daily, NICANOR Phan NP, 40 mg at 07/30/21 7431      TECHNICAL DETAILS:  Continuous video-EEG monitoring was performed with 27 surface scalp electrodes placed according to the International 10-20 electrode placement system, using a 32-channel Exara headbox. All EEG and video information was acquired digitally, including the use of automated spike and seizure detection software to detect epileptiform activity. An event button was also available to be depressed during clinical events. RESULTS:    This was an abnormal EEG due to:  1. Continuous generalized attenuated 0.5 -1 hz delta slowing of the background with occasional faster frequencies that shows minimal reactivity. Rhythmic bifrontal sharpslow sharp discharges occurring every 1 to 2 seconds are noted in the left frontal region consistent with PLEDS. These extend across the midline and are reflected in the right frontal area as well  2. No clear anterior-posterior gradient  3. No clear sleep-wake cycle    ACTIVATION PROCEDURES  Hyperventilation and photic stimulation: not performed     EKG  A single EKG channel showed a generally regular rhythm throughout the recording    COMPARISON  None    EVENTS RECORDED NONE     Initial review 0603-7282 hrs. Day 2  Time of review 1600 hrs through 0930 hrs. As the tracing continues, similar background activity persists and there is the development at times of high voltage more rhythmic right frontal slowing  and some degree of resolution of the PLED type activity. No recorded seizures  IMPRESSION   Severely abnormal record with evidence of bihemispheric dysfunction no active seizures recorded but clear-cut ictal propensity recording consistent with resolved status head dominant focus appears to be residual left frontal area.   As the tracing continues, impression remains the same  DAY3  Time Review  0930- 073 hours  As the tracing continues the background activity is unchanged. The recording continues to demonstrate mixed polymorphic and mono rhythmic slow activity    No seizures are recorded. Change in the study is noted at03 30 and video confirms at that juncture that the patient has removed the electrode cap    Recording reviewed through 0730 hrs where technologist ascertained the above    Impression    Abnormal EEG with evidence of diffuse generalized disturbance in electrocortical function.   There may be toxic metabolic components and it is noted the patient has hyperammonemia which can be seen in the context of valproic acid usage    MD Ruthie Hays, [address] [place] [address] Rd., [address] of Psychiatry and Neurology

## 2021-07-30 NOTE — PROGRESS NOTES
NEUROLOGY INPATIENT PROGRESS NOTE    7/30/2021         Subjective: Glenard Curling is a  46 y.o. male admitted on 7/27/2021 with Seizures ([facility] [address] [R56.9]    Briefly, this is a  46 y.o. male admitted on 7/27/2021 with  a past medical history of seizures after a left craniectomy for a left frontal subarachnoid cyst. The patient has a history of uncontrolled insulin dependant diabetes, neuropathy, hypertension, and several diabetic wounds on the lower limbs. The patient's symptoms began over a week ago when he began having more frequent seizures. These seizures also had new characteristics compared to his regular. He began having fecal incontinence and grossly longer postictal confusion and drowsiness. He has had 2 total GTC seizures in the past. He has staring spells preceded by an aura of facial numbness which occurs 2-3 times per week. He also frequently has autonomic seizures where his eyes roll back and he vomits.  His mental status has slowly deteriorated over the last week. [facility] has a wound on his left heel that required two I&D's and 2 wounds on his right leg complicated by cellulitis. He is currently taking vancomycin. His current seizure medications are keppra 2g bid, depakote 750 bid, zonisamide 100mg nightly, vimpat 200 mg bid. He has tried dilantin in the past but it was stopped. His ammonia was elevated to 109, keppra level was with limits, depakote level is 66, and 6.9 free. Zonisamide level 13. The patient has not received his lactulose for the past day. Today his ammonia level this morning at 544 was 142. This is likely a cummulitive effect from the increased divalproex dose and not receiving lactulose. Levocarnitine 6000 followed by 3000 Q8h was administered and his depakote dose was returned to 500mg BID. He ripped off the EEG leads this morning as well. He is acutely confused and encephalopathic. No current facility-administered medications on file prior to encounter.      Current Outpatient Medications on File Prior to Encounter   Medication Sig Dispense Refill    insulin lispro (HUMALOG) 100 UNIT/ML injection vial Takes 10 units with meals plus group 2 sliding scale      promethazine (PHENERGAN) 12.5 MG tablet Take 1-2 tablets by mouth every 8 hours as needed for Nausea 60 tablet 1    blood glucose test strips (ONETOUCH ULTRA) strip 1 each by In Vitro route 3 times daily DX: E11.65 Dispense Onetouch Ultra 2 test strips 300 each 3    pantoprazole (PROTONIX) 40 MG tablet Take 1 tablet by mouth 2 times daily 180 tablet 1    vitamin D (ERGOCALCIFEROL) 1.25 MG (81669 UT) CAPS capsule Take 1 capsule by mouth once a week 12 capsule 1    insulin lispro, 1 Unit Dial, (HUMALOG KWIKPEN) 100 UNIT/ML SOPN Inject 10 Units into the skin 3 times daily Plus sliding scale 2 9 mL 0    insulin glargine (LANTUS SOLOSTAR) 100 UNIT/ML injection pen Inject 75 Units into the skin 2 times daily      gabapentin (NEURONTIN) 600 MG tablet Take 600 mg by mouth 4 times daily.  rOPINIRole (REQUIP) 1 MG tablet TAKE 1 TABLET BY MOUTH THREE TIMES A  tablet 1    metoprolol tartrate (LOPRESSOR) 50 MG tablet Take 1.5 tablets by mouth 2 times daily (Patient taking differently: Take 50 mg by mouth 2 times daily ) 270 tablet 1    empagliflozin (JARDIANCE) 25 MG tablet Take 25 mg by mouth daily 42 tablet 0    DULoxetine (CYMBALTA) 60 MG extended release capsule Take by mouth daily 2 tab      lacosamide (VIMPAT) 100 MG TABS tablet Take 200 mg by mouth 2 times daily.        sildenafil (VIAGRA) 100 MG tablet Take 1 tablet by mouth as needed for Erectile Dysfunction 45 tablet 3    divalproex (DEPAKOTE) 500 MG DR tablet Take 500 mg by mouth 2 times daily       levETIRAcetam (KEPPRA) 500 MG tablet Take 2,000 mg by mouth 2 times daily       zonisamide (ZONEGRAN) 100 MG capsule Take by mouth nightly 5 capsules         Allergies: Mir Guido is allergic to ciprofloxacin-ciproflox hcl er, heparin, metformin, niacin and related, tramadol hcl, ultram [tramadol], and warfarin. Past Medical History:   Diagnosis Date    Abnormal thyroid biopsy     s/p left hemithyroidectomy 6/5693 - follicular adenoma    Acid reflux     Anemia     resolved - previously seen by Dr. Griselda Arriaga (due to enlarged spleen)    Anesthesia     seizures with brain surgery due to blood sugar got really high    Bipolar disorder ([facility] [address]     Blood clot in vein 04/07/2016    Isabella Flores     Cancer [facility]) 04/2019    thyroid    Chest pain     previously seeing [facility] cardiologist, now seeing Dr. Eric Echavarria (LAD bridging on cath 4/2016)    Chronic back pain     Chronic bronchitis ([facility] [address]     Dr. Chloé Catherine Colon polyp 08/30/2016    Depression     seeing Florian Gonzalez DVT (deep venous thrombosis) ([facility] [address] [phone]    not on Coumadin due to unable to regulate - Dr. Clau Palacios - no etiology found per patient    Esophageal abnormality     nodule     Fatty liver disease, nonalcoholic     U/S 08/9220 [facility]    Furuncle     legs    History of Doppler ultrasound 05/29/2011    No hemodynamically significant carotid stenosis is identified. A thyroid nodule on each side. Dedicated ultrasound of thyroid gland is suggested to further evaluate if clinically indicated.      History of pulmonary embolism 2007    s/p GFF, related to knee surgery    Hx of blood clots     leg and lung    Hyperlipidemia     severely elevated triglycerides    Hypertension     diastolic    Intracranial arachnoid cyst 11/2012    Dr. Claire Delong drained, complicated with seizures, DKA    Irritable bowel syndrome     Liver disease     Dulcy Sera - elevated LFT - positive smooth muscle antibody, steatosis per liver bx 3/2014 with Dr. Cuco Kolb (multiple drug resistant organisms) resistance 2012    MRSA (methicillin resistant staph aureus) culture positive     h/o in foot and before brain surgery    Nephrolithiasis     noted on CT abdomen 9/2016, 6/2019    Neuropathy     Pancreatic insufficiency     Positive SANDY (antinuclear antibody)     Dr. Anne Kolb - first visit in 6/2013    Prolonged emergence from general anesthesia     Restless legs syndrome     Seizures ([address]     started with brain surgery    Sinus tachycardia     Holter 3/2013 - seeing Dr. German Shearer Skull fracture [facility])     clips     Sleep apnea     positive sleep apnea per study 10/2020.  Systolic dysfunction     \"systolic bridge\"  EF 32% ECHO 9/2019    Type II or unspecified type diabetes mellitus without mention of complication, not stated as uncontrolled 2007       Past Surgical History:   Procedure Laterality Date    CARDIAC CATHETERIZATION      2011,5-6 years ago   [address]  3-2012   [address]  04/28/2016    [facility]     CARPAL TUNNEL RELEASE  01/2017    CHOLECYSTECTOMY  2004    COLONOSCOPY  2012    COLONOSCOPY  08/2016    2 tubular adenomas - reportedly needs repeat scope in 6 months    CRANIOTOMY  11/2012    arachnoid cyst drainage    EKG 12-LEAD  10/18/2015         ENDOSCOPY, COLON, DIAGNOSTIC      HERNIA REPAIR Right 1996    [facility]--Dr. Pimentel Read INGUINAL HERNIA REPAIR Right 09/15/2016    Robotic assisted    KNEE ARTHROSCOPY Right 2016    KNEE SURGERY Left 2007    acl and debrided twice    OTHER SURGICAL HISTORY  5-31-11    Tilt table was associated w/ nonspecific symptoms or nausea, otherwise no significant dizziness or syncope. No significant hemodynamic changes. Otherwise, unremarkable tilt table test after 30 minutes of tilting.  OTHER SURGICAL HISTORY  01/20/2017    RIGHT SHOULDER ARTHROSCOPY, OPEN STAN, OPEN ACROMIOPLASTY, BICEP TENDESIS, RIGHT CARPAL TUNNEL RELEASE    SHOULDER SURGERY Right 01/2007    THYROID LOBECTOMY N/A 1/15/2021    THYROID LOBECTOMY, UVULOPALATOPHARYNGOPLAST LAOP performed by Lori Burch MD at [facility] ECHOCARDIOGRAM  3-05-11    LV size and systolic function normal. EF 55-65%.      UPPER GASTROINTESTINAL ENDOSCOPY 2012    UPPER GASTROINTESTINAL ENDOSCOPY  2016    Dr. Luis Jon Left 10/22/2019    EGD DILATION SAVORY performed by Mariely Adams MD at [address] [facility]    UPPER GASTROINTESTINAL ENDOSCOPY Left 10/22/2019    EGD BIOPSY performed by Mariely Adams MD at [address]  2007       Medications:     lactulose  20 g Oral TID    insulin glargine  45 Units Subcutaneous BID    vancomycin (VANCOCIN) intermittent dosing (placeholder)   Other RX Placeholder    divalproex  750 mg Oral BID    zonisamide  500 mg Oral Nightly    DULoxetine  60 mg Oral Daily    pantoprazole  40 mg Oral BID    levETIRAcetam  2,000 mg Oral BID    metoprolol tartrate  75 mg Oral BID    rOPINIRole  1 mg Oral TID    insulin lispro  0-12 Units Subcutaneous TID WC    insulin lispro  0-6 Units Subcutaneous Nightly    lacosamide  200 mg Oral BID    lactobacillus  1 capsule Oral Daily    sodium chloride flush  5-40 mL Intravenous 2 times per day    vancomycin  1,250 mg Intravenous Q8H    enoxaparin  40 mg Subcutaneous Daily     PRN Meds include: glucose, dextrose, glucagon (rDNA), dextrose, sodium chloride flush, sodium chloride, potassium chloride **OR** potassium alternative oral replacement **OR** potassium chloride, magnesium sulfate, ondansetron **OR** ondansetron, polyethylene glycol, acetaminophen **OR** acetaminophen    Objective:   /77   Pulse 56   Temp 98.4 °F (36.9 °C) (Axillary)   Resp 15   Ht 6' 2\" (1.88 m)   Wt 252 lb (114.3 kg)   SpO2 95%   BMI 32.35 kg/m²     Blood pressure range: Systolic (04MHT), MVB:901 , Min:129 , OJI:366   ; Diastolic (05VHF), RIM:30, Min:69, Max:95      ROS:  Unable to assess due to mental status      NEUROLOGIC EXAMINATION  Physical Exam  Constitutional:       Appearance: Normal appearance.    HENT:      Head:      Comments: Evidence of craniotomy     Mouth/Throat:      Mouth: Mucous membranes are moist.      Pharynx: Oropharynx is clear. Eyes:      Extraocular Movements: Extraocular movements intact. Conjunctiva/sclera: Conjunctivae normal.      Pupils: Pupils are equal, round, and reactive to light. Cardiovascular:      Rate and Rhythm: Normal rate and regular rhythm. Pulses: Normal pulses. Heart sounds: Normal heart sounds. Pulmonary:      Effort: Pulmonary effort is normal.      Breath sounds: Normal breath sounds. Abdominal:      General: Abdomen is flat. Palpations: Abdomen is soft. Musculoskeletal:         General: Normal range of motion. Cervical back: Normal range of motion and neck supple. Skin:     Findings: Lesion present. Comments: Wound on left heel, 2 wounds on the right shin with cellulitis   Neurological:      Coordination: Finger-Nose-Finger Test abnormal.      Deep Tendon Reflexes:      Reflex Scores:       Tricep reflexes are 2+ on the right side and 2+ on the left side. Bicep reflexes are 2+ on the right side and 2+ on the left side. Brachioradialis reflexes are 2+ on the right side and 2+ on the left side. Patellar reflexes are 2+ on the right side and 2+ on the left side. Achilles reflexes are 2+ on the right side and 2+ on the left side. Psychiatric:         Mood and Affect: Mood normal.         Speech: Speech is slurred. Behavior: Behavior normal.     .  Neurologic Exam     Mental Status   Oriented to person. Disoriented to place. Disoriented to country, city and area. Disoriented to time. Disoriented to year, month, date, day and season. Follows 1 step commands. Attention: decreased. Concentration: decreased. Speech: slurred   Level of consciousness: arousable by verbal stimuli  Knowledge: good. Able to name object. Able to read. Able to repeat. Unable to write. Abnormal comprehension. Cranial Nerves     CN II   Visual fields full to confrontation.      CN III, IV, VI   Pupils are equal, round, and reactive to light.  CN III: no CN III palsy  CN VI: no CN VI palsy    CN V   Facial sensation intact. CN VII   Facial expression full, symmetric.      CN VIII   CN VIII normal.     CN IX, X   CN IX normal.   CN X normal.     CN XI   CN XI normal.     CN XII   CN XII normal.     Motor Exam   Muscle bulk: normal  Overall muscle tone: normal  Right arm tone: normal  Left arm tone: normal  Right arm pronator drift: absent  Left arm pronator drift: absent  Right leg tone: normal  Left leg tone: normal    Strength   Right neck flexion: 5/5  Left neck flexion: 5/5  Right neck extension: 5/5  Left neck extension: 5/5  Right deltoid: 5/5  Left deltoid: 5/5  Right biceps: 5/5  Left biceps: 5/5  Right triceps: 5/5  Left triceps: 5/5  Right wrist flexion: 5/5  Left wrist flexion: 5/5  Right wrist extension: 5/5  Left wrist extension: 5/5  Right interossei: 5/5  Left interossei: 5/5  Left abdominals: 5/5  Right iliopsoas: 5/5  Left iliopsoas: 5/5  Right quadriceps: 5/5  Left quadriceps: 5/5  Right hamstrin/5  Left hamstrin/5  Right glutei: 5/5  Left glutei: 5/5  Right anterior tibial: 5/5  Left anterior tibial: 5/5  Right posterior tibial: 5/5  Left posterior tibial: 5/5  Right peroneal: 5/5  Left peroneal: 5/5  Right gastroc: 5/5  Left gastroc: 5/5    Gait, Coordination, and Reflexes     Coordination   Finger to nose coordination: abnormal    Reflexes   Right brachioradialis: 2+  Left brachioradialis: 2+  Right biceps: 2+  Left biceps: 2+  Right triceps: 2+  Left triceps: 2+  Right patellar: 2+  Left patellar: 2+  Right achilles: 2+  Left achilles: 2+  Right plantar: normal  Left plantar: normal       Lab Results:   CBC:   Recent Labs     21  1447 21  0516 21  0544   WBC 5.5 5.6 5.9   HGB 13.5 13.9 14.8    273 See Reflexed IPF Result     BMP:    Recent Labs     21  0507 21  1447 21  0516    137 138   K 3.7 3.8 3.8   * 106 106   CO2 20 20 18*   BUN 8 6 6   CREATININE 0.63* BID  3.) Neurology will f/u  4.) Current antibiotics as per primary        Sherie Johansen MD  PGY-1 Resident

## 2021-07-30 NOTE — PROGRESS NOTES
Occupational Therapy  Facility/Department: [facility] 4A STEPDOWN  Daily Treatment Note  NAME: Mae Grullon  : 1970  MRN: [id number]    Date of Service: 2021    Discharge Recommendations:  Patient would benefit from continued therapy after discharge  OT Equipment Recommendations  ADL Assistive Devices: Grab Bars - shower; Toileting - Drop Arm Commode, Heavy Duty Drop Arm Commode;Transfer Tub Bench;Reacher    Assessment   Performance deficits / Impairments: Decreased functional mobility ; Decreased endurance;Decreased ADL status; Decreased balance;Decreased safe awareness;Decreased high-level IADLs;Decreased cognition  Prognosis: Good  Patient Education: Pt educated on WB precautions, benefits of OOB activity, benefits of continued therapy- poor return  Barriers to Learning: cognition  REQUIRES OT FOLLOW UP: Yes  Activity Tolerance  Activity Tolerance: Patient Tolerated treatment well;Treatment limited secondary to decreased cognition  Activity Tolerance: Limited by ability to follow WB status  Safety Devices  Safety Devices in place: Yes  Type of devices: Left in bed;Bed alarm in place;Call light within reach;Nurse notified;Gait belt;Patient at risk for falls  Restraints  Initially in place: No         Patient Diagnosis(es): There were no encounter diagnoses.       has a past medical history of Abnormal thyroid biopsy, Acid reflux, Anemia, Anesthesia, Bipolar disorder ([address] Blood clot in vein, Cancer ([address] Chest pain, Chronic back pain, Chronic bronchitis ([address] Colon polyp, Depression, DVT (deep venous thrombosis) ([address] Esophageal abnormality, Fatty liver disease, nonalcoholic, Furuncle, History of Doppler ultrasound, History of pulmonary embolism, Hx of blood clots, Hyperlipidemia, Hypertension, Intracranial arachnoid cyst, Irritable bowel syndrome, Liver disease, MDRO (multiple drug resistant organisms) resistance, MRSA (methicillin resistant staph aureus) culture positive, Nephrolithiasis, Neuropathy, Pancreatic insufficiency, Positive SANDY (antinuclear antibody), Prolonged emergence from general anesthesia, Restless legs syndrome, Seizures (HCC), Sinus tachycardia, Skull fracture (HCC), Sleep apnea, Systolic dysfunction, and Type II or unspecified type diabetes mellitus without mention of complication, not stated as uncontrolled. has a past surgical history that includes knee surgery (Left, 2007); Vena Cava Filter Placement (2007); hernia repair (Right, 1996); Cholecystectomy (2004); Upper gastrointestinal endoscopy (2012); transthoracic echocardiogram (3-05-11); other surgical history (5-31-11); Cardiac catheterization; Cardiac catheterization (3-2012); craniotomy (11/2012); Tonsillectomy; Endoscopy, colon, diagnostic; EKG 12 Lead (10/18/2015); Knee arthroscopy (Right, 2016); Cardiac catheterization (04/28/2016); Upper gastrointestinal endoscopy (2016); Inguinal hernia repair (Right, 09/15/2016); Colonoscopy (2012); Colonoscopy (08/2016); other surgical history (01/20/2017); shoulder surgery (Right, 01/2007); Carpal tunnel release (01/2017); Upper gastrointestinal endoscopy (Left, 10/22/2019); Upper gastrointestinal endoscopy (Left, 10/22/2019); and Thyroid lobectomy (N/A, 1/15/2021). Restrictions  Restrictions/Precautions  Restrictions/Precautions: Weight Bearing  Required Braces or Orthoses?: No  Lower Extremity Weight Bearing Restrictions  Right Lower Extremity Weight Bearing: Weight Bearing As Tolerated  Left Lower Extremity Weight Bearing: Non Weight Bearing  Position Activity Restriction  Other position/activity restrictions:  Up with assist  Subjective   General  Chart Reviewed: Yes  Patient assessed for rehabilitation services?: Yes  Family / Caregiver Present: Yes (Wife)  General Comment  Comments: RN ok'd for therapy, pt agreeable to session, pleasant throughout  Vital Signs  Patient Currently in Pain: Denies   Orientation  Orientation  Overall Orientation Status: Impaired  Orientation Level: Oriented to person;Disoriented to place; Disoriented to time;Oriented to situation  Objective    ADL  Grooming: Minimal assistance;Verbal cueing;Setup; Increased time to complete (Pt washed face sitting EOB, pt req v/c to bring washcloth to face.)  UE Bathing: Minimal assistance; Increased time to complete;Verbal cueing;Setup (Pt washed BUE, chest sitting EOB, req Min A and hand over hand assistance for pt to cross midline to wash under arms)  LE Bathing: Setup;Verbal cueing; Increased time to complete; Moderate assistance (Pt washed BLE sitting EOB. Pt req multiple v/c to initiate task. Pt would try to wash under arms, req redirection to BLE.)  UE Dressing: Minimal assistance; Increased time to complete;Verbal cueing;Setup (Don/doff gown sitting EOB, req Min A to thread BUE into gown)        Balance  Sitting Balance: Contact guard assistance (CGA-SBA sitting EOB, tolerating approx 25 min)  Standing Balance: Maximum assistance  Standing Balance  Time: Approx 2 min  Activity: Standing EOB  Comment: Pt req multiple v/c, t/c to maintain NWB on LLE with P return, once standing pt req v/c to lift LLE to avoid bearing weight with P/F return. Pt returned to EOB d/t being unable to follow NWB precautions  Functional Mobility  Functional Mobility Comments: NICOLE d/t P maintenance of NWB status  Bed mobility  Supine to Sit: Contact guard assistance  Sit to Supine: Contact guard assistance  Scooting: Contact guard assistance     Cognition  Overall Cognitive Status: Exceptions  Arousal/Alertness: Delayed responses to stimuli  Following Commands: Follows one step commands with repetition; Follows one step commands with increased time  Attention Span: Difficulty attending to directions  Memory: Decreased recall of precautions  Safety Judgement: Decreased awareness of need for assistance;Decreased awareness of need for safety  Problem Solving: Assistance required to identify errors made;Assistance required to correct errors made;Decreased awareness of errors  Insights: Decreased awareness of deficits  Initiation: Requires cues for some  Sequencing: Requires cues for some     Plan   Plan  Times per week: 3-5x/wk  Current Treatment Recommendations: Patient/Caregiver Education & Training, Balance Training, Functional Mobility Training, Cognitive Reorientation, Endurance Training, Pain Management, Cognitive/Perceptual Training, Safety Education & Training, Self-Care / ADL  AM-PAC Score        AM-PAC Inpatient Daily Activity Raw Score: 16 (07/30/21 1555)  AM-PAC Inpatient ADL T-Scale Score : 35.96 (07/30/21 1555)  ADL Inpatient CMS 0-100% Score: 53.32 (07/30/21 1555)  ADL Inpatient CMS G-Code Modifier : CK (07/30/21 1555)    Goals  Short term goals  Time Frame for Short term goals: By discharge, pt will be able to:  Short term goal 1: Demo bed mobility with SBA and use of handrails  Short term goal 2: Demo func transfers with Jarrett and following all WB precautions with less than 2 VCs  Short term goal 3: Demo static standing balance for 5+ minutes with Jarrett and LRD PRN, following all WB precautions, and 1 VC, to increase activity tolerance for functional tasks  Short term goal 4: Demo UB ADLs with SBA and 0 VCs  Short term goal 5: Demo LB ADLs with CGA, AE/DME PRN, and 0 VCs to follow WB precautions  Short term goal 6: Demo 100% accuracy to NWB precautions throughout all func tasks with less than 4 VCs       Therapy Time   Individual Concurrent Group Co-treatment   Time In 1448         Time Out 1527         Minutes 39         Timed Code Treatment Minutes: 23 Minutes (16 min co-tx PT)   Pt in bed upon arrival, pleasantly confused but agreeable to tx this date. Pt retired to bed at end of session, call light within reach, bed alarm on, RN notified.      JIE Giles/EDITH

## 2021-07-31 ENCOUNTER — APPOINTMENT (OUTPATIENT)
Dept: GENERAL RADIOLOGY | Age: 51
DRG: 056 | End: 2021-07-31
Attending: INTERNAL MEDICINE
Payer: MEDICARE

## 2021-07-31 ENCOUNTER — APPOINTMENT (OUTPATIENT)
Dept: MRI IMAGING | Age: 51
DRG: 056 | End: 2021-07-31
Attending: INTERNAL MEDICINE
Payer: MEDICARE

## 2021-07-31 LAB
ABSOLUTE EOS #: 0.15 K/UL (ref 0–0.44)
ABSOLUTE EOS #: 0.16 K/UL (ref 0–0.44)
ABSOLUTE IMMATURE GRANULOCYTE: 0.14 K/UL (ref 0–0.3)
ABSOLUTE IMMATURE GRANULOCYTE: 0.26 K/UL (ref 0–0.3)
ABSOLUTE LYMPH #: 1.38 K/UL (ref 1.1–3.7)
ABSOLUTE LYMPH #: 1.82 K/UL (ref 1.1–3.7)
ABSOLUTE MONO #: 0.66 K/UL (ref 0.1–1.2)
ABSOLUTE MONO #: 0.67 K/UL (ref 0.1–1.2)
ANION GAP SERPL CALCULATED.3IONS-SCNC: 8 MMOL/L (ref 9–17)
BASOPHILS # BLD: 0 % (ref 0–2)
BASOPHILS # BLD: 1 % (ref 0–2)
BASOPHILS ABSOLUTE: 0.03 K/UL (ref 0–0.2)
BASOPHILS ABSOLUTE: 0.04 K/UL (ref 0–0.2)
BUN BLDV-MCNC: 6 MG/DL (ref 6–20)
BUN/CREAT BLD: ABNORMAL (ref 9–20)
C-REACTIVE PROTEIN: <3 MG/L (ref 0–5)
CALCIUM SERPL-MCNC: 8.9 MG/DL (ref 8.6–10.4)
CHLORIDE BLD-SCNC: 109 MMOL/L (ref 98–107)
CO2: 22 MMOL/L (ref 20–31)
CREAT SERPL-MCNC: 0.64 MG/DL (ref 0.7–1.2)
DIFFERENTIAL TYPE: ABNORMAL
DIFFERENTIAL TYPE: ABNORMAL
EOSINOPHILS RELATIVE PERCENT: 2 % (ref 1–4)
EOSINOPHILS RELATIVE PERCENT: 2 % (ref 1–4)
GFR AFRICAN AMERICAN: >60 ML/MIN
GFR NON-AFRICAN AMERICAN: >60 ML/MIN
GFR SERPL CREATININE-BSD FRML MDRD: ABNORMAL ML/MIN/{1.73_M2}
GFR SERPL CREATININE-BSD FRML MDRD: ABNORMAL ML/MIN/{1.73_M2}
GLUCOSE BLD-MCNC: 168 MG/DL (ref 75–110)
GLUCOSE BLD-MCNC: 175 MG/DL (ref 75–110)
GLUCOSE BLD-MCNC: 182 MG/DL (ref 70–99)
GLUCOSE BLD-MCNC: 196 MG/DL (ref 75–110)
GLUCOSE BLD-MCNC: 225 MG/DL (ref 75–110)
HCT VFR BLD CALC: 36.7 % (ref 40.7–50.3)
HCT VFR BLD CALC: 40.8 % (ref 40.7–50.3)
HEMOGLOBIN: 12.4 G/DL (ref 13–17)
HEMOGLOBIN: 14 G/DL (ref 13–17)
IMMATURE GRANULOCYTES: 2 %
IMMATURE GRANULOCYTES: 4 %
LYMPHOCYTES # BLD: 19 % (ref 24–43)
LYMPHOCYTES # BLD: 25 % (ref 24–43)
MCH RBC QN AUTO: 30 PG (ref 25.2–33.5)
MCH RBC QN AUTO: 30.1 PG (ref 25.2–33.5)
MCHC RBC AUTO-ENTMCNC: 33.8 G/DL (ref 28.4–34.8)
MCHC RBC AUTO-ENTMCNC: 34.3 G/DL (ref 28.4–34.8)
MCV RBC AUTO: 87.7 FL (ref 82.6–102.9)
MCV RBC AUTO: 88.6 FL (ref 82.6–102.9)
MONOCYTES # BLD: 9 % (ref 3–12)
MONOCYTES # BLD: 9 % (ref 3–12)
NRBC AUTOMATED: 0 PER 100 WBC
NRBC AUTOMATED: 0 PER 100 WBC
PDW BLD-RTO: 12.2 % (ref 11.8–14.4)
PDW BLD-RTO: 13.2 % (ref 11.8–14.4)
PLATELET # BLD: 228 K/UL (ref 138–453)
PLATELET # BLD: 253 K/UL (ref 138–453)
PLATELET ESTIMATE: ABNORMAL
PLATELET ESTIMATE: ABNORMAL
PMV BLD AUTO: 10 FL (ref 8.1–13.5)
PMV BLD AUTO: 9.8 FL (ref 8.1–13.5)
POTASSIUM SERPL-SCNC: 3.6 MMOL/L (ref 3.7–5.3)
RBC # BLD: 4.14 M/UL (ref 4.21–5.77)
RBC # BLD: 4.65 M/UL (ref 4.21–5.77)
RBC # BLD: ABNORMAL 10*6/UL
RBC # BLD: ABNORMAL 10*6/UL
SEDIMENTATION RATE, ERYTHROCYTE: 4 MM (ref 0–20)
SEG NEUTROPHILS: 61 % (ref 36–65)
SEG NEUTROPHILS: 66 % (ref 36–65)
SEGMENTED NEUTROPHILS ABSOLUTE COUNT: 4.55 K/UL (ref 1.5–8.1)
SEGMENTED NEUTROPHILS ABSOLUTE COUNT: 4.87 K/UL (ref 1.5–8.1)
SODIUM BLD-SCNC: 139 MMOL/L (ref 135–144)
WBC # BLD: 7.4 K/UL (ref 3.5–11.3)
WBC # BLD: 7.4 K/UL (ref 3.5–11.3)
WBC # BLD: ABNORMAL 10*3/UL
WBC # BLD: ABNORMAL 10*3/UL

## 2021-07-31 PROCEDURE — 6370000000 HC RX 637 (ALT 250 FOR IP)

## 2021-07-31 PROCEDURE — 6360000002 HC RX W HCPCS: Performed by: INTERNAL MEDICINE

## 2021-07-31 PROCEDURE — 99232 SBSQ HOSP IP/OBS MODERATE 35: CPT | Performed by: PSYCHIATRY & NEUROLOGY

## 2021-07-31 PROCEDURE — APPSS30 APP SPLIT SHARED TIME 16-30 MINUTES: Performed by: NURSE PRACTITIONER

## 2021-07-31 PROCEDURE — 97530 THERAPEUTIC ACTIVITIES: CPT

## 2021-07-31 PROCEDURE — 85025 COMPLETE CBC W/AUTO DIFF WBC: CPT

## 2021-07-31 PROCEDURE — 6370000000 HC RX 637 (ALT 250 FOR IP): Performed by: INTERNAL MEDICINE

## 2021-07-31 PROCEDURE — 2580000003 HC RX 258: Performed by: CLINICAL NURSE SPECIALIST

## 2021-07-31 PROCEDURE — 99233 SBSQ HOSP IP/OBS HIGH 50: CPT | Performed by: FAMILY MEDICINE

## 2021-07-31 PROCEDURE — 2580000003 HC RX 258: Performed by: STUDENT IN AN ORGANIZED HEALTH CARE EDUCATION/TRAINING PROGRAM

## 2021-07-31 PROCEDURE — 80048 BASIC METABOLIC PNL TOTAL CA: CPT

## 2021-07-31 PROCEDURE — 85652 RBC SED RATE AUTOMATED: CPT

## 2021-07-31 PROCEDURE — 2060000000 HC ICU INTERMEDIATE R&B

## 2021-07-31 PROCEDURE — 6360000002 HC RX W HCPCS: Performed by: STUDENT IN AN ORGANIZED HEALTH CARE EDUCATION/TRAINING PROGRAM

## 2021-07-31 PROCEDURE — 99223 1ST HOSP IP/OBS HIGH 75: CPT | Performed by: PODIATRIST

## 2021-07-31 PROCEDURE — 2580000003 HC RX 258: Performed by: INTERNAL MEDICINE

## 2021-07-31 PROCEDURE — 6360000002 HC RX W HCPCS: Performed by: NURSE PRACTITIONER

## 2021-07-31 PROCEDURE — 86140 C-REACTIVE PROTEIN: CPT

## 2021-07-31 PROCEDURE — 6370000000 HC RX 637 (ALT 250 FOR IP): Performed by: CLINICAL NURSE SPECIALIST

## 2021-07-31 PROCEDURE — 99232 SBSQ HOSP IP/OBS MODERATE 35: CPT | Performed by: INTERNAL MEDICINE

## 2021-07-31 PROCEDURE — 82947 ASSAY GLUCOSE BLOOD QUANT: CPT

## 2021-07-31 PROCEDURE — 95711 VEEG 2-12 HR UNMONITORED: CPT

## 2021-07-31 PROCEDURE — 6370000000 HC RX 637 (ALT 250 FOR IP): Performed by: NURSE PRACTITIONER

## 2021-07-31 PROCEDURE — 97110 THERAPEUTIC EXERCISES: CPT

## 2021-07-31 PROCEDURE — 36415 COLL VENOUS BLD VENIPUNCTURE: CPT

## 2021-07-31 RX ORDER — GABAPENTIN 400 MG/1
400 CAPSULE ORAL 4 TIMES DAILY
Status: DISCONTINUED | OUTPATIENT
Start: 2021-07-31 | End: 2021-08-05 | Stop reason: HOSPADM

## 2021-07-31 RX ORDER — LORAZEPAM 2 MG/ML
1 INJECTION INTRAMUSCULAR EVERY 30 MIN PRN
Status: DISCONTINUED | OUTPATIENT
Start: 2021-07-31 | End: 2021-08-05 | Stop reason: HOSPADM

## 2021-07-31 RX ADMIN — LEVETIRACETAM 2000 MG: 500 TABLET, FILM COATED ORAL at 08:59

## 2021-07-31 RX ADMIN — ROPINIROLE HYDROCHLORIDE 1 MG: 1 TABLET, FILM COATED ORAL at 20:55

## 2021-07-31 RX ADMIN — Medication 1250 MG: at 20:59

## 2021-07-31 RX ADMIN — PANTOPRAZOLE SODIUM 40 MG: 40 TABLET, DELAYED RELEASE ORAL at 20:58

## 2021-07-31 RX ADMIN — INSULIN LISPRO 4 UNITS: 100 INJECTION, SOLUTION INTRAVENOUS; SUBCUTANEOUS at 17:14

## 2021-07-31 RX ADMIN — DIVALPROEX SODIUM 500 MG: 500 TABLET, DELAYED RELEASE ORAL at 08:59

## 2021-07-31 RX ADMIN — LEVOCARNITINE 3000 MG: 1 INJECTION, SOLUTION INTRAVENOUS at 01:25

## 2021-07-31 RX ADMIN — LACOSAMIDE 200 MG: 100 TABLET, FILM COATED ORAL at 20:57

## 2021-07-31 RX ADMIN — ROPINIROLE HYDROCHLORIDE 1 MG: 1 TABLET, FILM COATED ORAL at 14:27

## 2021-07-31 RX ADMIN — LACTULOSE 20 G: 20 SOLUTION ORAL at 21:03

## 2021-07-31 RX ADMIN — SODIUM CHLORIDE, PRESERVATIVE FREE 10 ML: 5 INJECTION INTRAVENOUS at 08:59

## 2021-07-31 RX ADMIN — Medication 1250 MG: at 14:27

## 2021-07-31 RX ADMIN — SODIUM CHLORIDE, PRESERVATIVE FREE 10 ML: 5 INJECTION INTRAVENOUS at 20:59

## 2021-07-31 RX ADMIN — DULOXETINE HYDROCHLORIDE 60 MG: 30 CAPSULE, DELAYED RELEASE ORAL at 08:58

## 2021-07-31 RX ADMIN — INSULIN GLARGINE 47 UNITS: 100 INJECTION, SOLUTION SUBCUTANEOUS at 09:02

## 2021-07-31 RX ADMIN — INSULIN LISPRO 2 UNITS: 100 INJECTION, SOLUTION INTRAVENOUS; SUBCUTANEOUS at 12:22

## 2021-07-31 RX ADMIN — PANTOPRAZOLE SODIUM 40 MG: 40 TABLET, DELAYED RELEASE ORAL at 08:59

## 2021-07-31 RX ADMIN — DIVALPROEX SODIUM 500 MG: 500 TABLET, DELAYED RELEASE ORAL at 20:58

## 2021-07-31 RX ADMIN — LEVOCARNITINE 3000 MG: 1 INJECTION, SOLUTION INTRAVENOUS at 09:43

## 2021-07-31 RX ADMIN — GABAPENTIN 400 MG: 400 CAPSULE ORAL at 20:58

## 2021-07-31 RX ADMIN — Medication 1250 MG: at 05:19

## 2021-07-31 RX ADMIN — LEVETIRACETAM 2000 MG: 500 TABLET, FILM COATED ORAL at 20:57

## 2021-07-31 RX ADMIN — INSULIN LISPRO 1 UNITS: 100 INJECTION, SOLUTION INTRAVENOUS; SUBCUTANEOUS at 20:59

## 2021-07-31 RX ADMIN — INSULIN GLARGINE 47 UNITS: 100 INJECTION, SOLUTION SUBCUTANEOUS at 20:58

## 2021-07-31 RX ADMIN — LACTULOSE 20 G: 20 SOLUTION ORAL at 08:59

## 2021-07-31 RX ADMIN — ACETAMINOPHEN 650 MG: 325 TABLET ORAL at 05:23

## 2021-07-31 RX ADMIN — ROPINIROLE HYDROCHLORIDE 1 MG: 1 TABLET, FILM COATED ORAL at 09:00

## 2021-07-31 RX ADMIN — LACOSAMIDE 200 MG: 100 TABLET, FILM COATED ORAL at 08:59

## 2021-07-31 RX ADMIN — ENOXAPARIN SODIUM 40 MG: 40 INJECTION SUBCUTANEOUS at 08:59

## 2021-07-31 RX ADMIN — ZONISAMIDE 500 MG: 100 CAPSULE ORAL at 20:58

## 2021-07-31 RX ADMIN — LACTULOSE 20 G: 20 SOLUTION ORAL at 14:27

## 2021-07-31 RX ADMIN — INSULIN LISPRO 2 UNITS: 100 INJECTION, SOLUTION INTRAVENOUS; SUBCUTANEOUS at 09:08

## 2021-07-31 RX ADMIN — Medication 1 CAPSULE: at 08:59

## 2021-07-31 ASSESSMENT — PAIN SCALES - GENERAL
PAINLEVEL_OUTOF10: 0
PAINLEVEL_OUTOF10: 0
PAINLEVEL_OUTOF10: 3
PAINLEVEL_OUTOF10: 0
PAINLEVEL_OUTOF10: 0

## 2021-07-31 ASSESSMENT — ENCOUNTER SYMPTOMS
RESPIRATORY NEGATIVE: 1
ALLERGIC/IMMUNOLOGIC NEGATIVE: 1
GASTROINTESTINAL NEGATIVE: 1

## 2021-07-31 NOTE — PROGRESS NOTES
[facility]  Progress Note    Nasra Geiger  MRN: [id number]  Date: 7/31/2021  LOS: 4     Reason for F/U :   Altered mental status, MRSA infection    Impression :   1. History of epilepsy with breakthrough seizure  2. Infected right lower extremity ulcerations and left heel wound secondary to MRSA   · status post I&D  3. Diabetes mellitus type 2  4. Essential hypertension  5. Altered mental status/encephalopathy-multifactorial/hyperammonemia    Recommendations:   · Patient continues on intravenous antimicrobial therapy with vancomycin for the skin and soft tissue infection secondary to MRSA. · This will continue while the patient is hospitalized. · The renal parameters remained stable  · The neurological work-up continues the patient just returned from an MRI  · The care was discussed with the patient who still remains quite emotional about his mentation issues and also discussed with his wife at bedside. · The wife does report overall he is improved    Infection Control Recommendations:   Contact precautions    Discharge Planning:   Estimated Length of IV antimicrobials: While hospitalized  Patient will need Midline Catheter Insertion/ PICC line Insertion: No  Patient will need: Home IV , [place],  SNF,  LTAC: Undetermined  Patient willneed outpatient wound care: Yes    Medical Decision making / Summary of Stay:   Nasra Geiger is a 46y.o.-year-old male who was initially admitted on 7/27/2021. Patient seen at the request of Dr. Hayley Lofton     INITIAL HISTORY:  Emperatriz Gray has a history of complex seizure disorder and diabetes mellitus, presented to [facility] on 7/20/2021 with 2 ulcerations on his right leg and one on his left heel. He states they had first appeared approximately 3 weeks prior, but have gotten worse with foul-smelling discharge and severe pain.   Wound cultures of the right leg yielded MRSA.     While at [facility], the patient underwent an I&D of the left heel wound per Podiatry and was treated for the MRSA positive right leg wounds with Vanco  per ID physician, Dr. Irene Mireles. One out of 2 blood cultures was positive for coag negative staph but this was considered as a contaminant.     The patient was doing well, but experienced a change in mentation and an EEG showed abnormal changes. He was going to be transferred to [place], where he follows up with his neurologist, but they had no beds available. He was then transferred to [facility] for further neurology work-up.     Per Podiatry at [facility] 84 on 21  Dermatologic: Open lesion x2 noted to anterior aspect of right leg. The proximal lesion measures 2cm in diameter and displays a necrotic base with orange exudate present. The more distal lesion measures 3.5 cm in diameter with a necrotic base, serosanquinous and purulent drainage noted. To both lesions, negative probe to bone, negative malodor. Puncture wound with underlying eschar noted beneath superficial skin to left heel, plantar aspect. Post-debridement: left heel expresses 5cc of purulent drainage, probes to calcaneus. Extensive malodor present.        RLE   21       LLE post-debridement        21        EEG completed 21, showed abnormal bihemispheric dysfunction, no active seizures.       21 Ammonia 142 --> 53  On lactulose and decreased dosages of Depakote    Current evaluation:2021    BP (!) 128/94   Pulse 67   Temp 98.6 °F (37 °C) (Oral)   Resp 15   Ht 6' 2\" (1.88 m)   Wt 248 lb 1.6 oz (112.5 kg)   SpO2 96%   BMI 31.85 kg/m²     Temperature Range: Temp: 98.6 °F (37 °C) Temp  Av.5 °F (36.9 °C)  Min: 97.9 °F (36.6 °C)  Max: 98.8 °F (37.1 °C)  The patient is seen and evaluated at bedside he is awake and alert in no acute distress. He continues to have some memory issues overall the mentation is improved. The patient was tearful when discussing what she could and could not remember.   His wife had to remind him that they had been transferred to [place] [address] in [place] during my evaluation. He does not report any subjective fever or chills. No cough or shortness of breath. Review of Systems   Constitutional: Negative. Respiratory: Negative. Cardiovascular: Negative. Gastrointestinal: Negative. Genitourinary: Negative. Musculoskeletal: Negative. Skin: Positive for wound. Allergic/Immunologic: Negative. Neurological: Negative. Physical Examination :     Physical Exam  Constitutional:       Appearance: He is well-developed. HENT:      Head: Normocephalic and atraumatic. Cardiovascular:      Rate and Rhythm: Normal rate. Heart sounds: Normal heart sounds. No friction rub. No gallop. Pulmonary:      Effort: Pulmonary effort is normal.      Breath sounds: Normal breath sounds. No wheezing. Abdominal:      General: Bowel sounds are normal.      Palpations: Abdomen is soft. There is no mass. Tenderness: There is no abdominal tenderness. Musculoskeletal:         General: Normal range of motion. Cervical back: Normal range of motion and neck supple. Lymphadenopathy:      Cervical: No cervical adenopathy. Skin:     General: Skin is warm and dry. Comments: There are dressings in the right anterior leg and left heel which were not removed   Neurological:      Mental Status: He is alert and oriented to person, place, and time.          Laboratory data:   I have independently reviewed the followinglabs:  CBC with Differential:   Recent Labs     07/31/21  0626 07/31/21  0758   WBC 7.4 7.4   HGB 12.4* 14.0   HCT 36.7* 40.8    228   LYMPHOPCT 19* 25   MONOPCT 9 9     BMP:   Recent Labs     07/29/21  0516 07/29/21  0516 07/30/21  0851 07/31/21  0758      < > 138 139   K 3.8   < > 3.9 3.6*      < > 106 109*   CO2 18*   < > 22 22   BUN 6   < > 6 6   CREATININE 0.62*   < > 0.70 0.64*   MG 1.9  --   --   --     < > = values in this interval not displayed. Hepatic Function Panel:   Recent Labs     07/28/21  1447 07/29/21  1158   PROT 6.8 7.1   LABALBU 3.4*  3.4* 3.6   BILIDIR 0.16 0.15   IBILI 0.37 0.46   BILITOT 0.53 0.61   ALKPHOS 160* 156*   ALT 53* 46*   AST 35 22         Lab Results   Component Value Date    PROCAL 0.11 08/12/2019     Lab Results   Component Value Date    CRP <3.0 07/31/2021    CRP 3.46 07/20/2021    CRP 0.54 11/15/2020     Lab Results   Component Value Date    SEDRATE 4 07/31/2021         Lab Results   Component Value Date    DDIMER < 215.00 11/15/2020    DDIMER < 215.00 10/16/2018    DDIMER 285.00 10/16/2016    DDIMER 110.21 01/04/2012     Lab Results   Component Value Date    FERRITIN 276 11/15/2020     Lab Results   Component Value Date     11/15/2020     01/25/2016     No results found for: FIBRINOGEN    No results found for requested labs within last 30 days. Lab Results   Component Value Date    COVID19 NOT DETECTED 04/21/2021    COVID19 Detected 11/11/2020       No results for input(s): VANCOTROUGH in the last 72 hours. Imaging Studies:   MRI ANKLE LEFT W WO CONTRAST  Impression       1. Increased signal intensity in the plantar soft tissues over the calcaneus which may represent a clinically described ulcer. No definite evidence of osteomyelitis in the underlying calcaneus. 2. Slightly increased fluid in the distal tibialis posterior tendon and along the peroneus brevis and longus tendon sheaths. 3. Increased signal intensity in the interosseous ligament between the talus and calcaneus which may be torn. 4. Abnormal signal intensity in the muscles along the medial aspect of the hindfoot. This may represent a tear of the abductor hallucis muscle belly. 5. Increased signal intensity in the flexor digitorum brevis muscle belly which may represent a partial tear. 6. Thickening at the attachment of the plantar fascia upon the calcaneus.    7. Small osteochondral defect in the medial talar dome. 8. Areas of abnormal signal intensity in the anterior process of the calcaneus, base of cuboid bone and tip of lateral malleolus suggestive of degenerative changes. 9. Increased signal intensity in the subcutaneous soft tissues over the medial and lateral aspects of the hindfoot consistent with edema and/or cellulitis. 10. Fluid collection along the talonavicular joint.           Cultures:   None    Medications:      divalproex  500 mg Oral BID    lactulose  20 g Oral TID    insulin glargine  47 Units Subcutaneous BID    vancomycin (VANCOCIN) intermittent dosing (placeholder)   Other RX Placeholder    zonisamide  500 mg Oral Nightly    DULoxetine  60 mg Oral Daily    pantoprazole  40 mg Oral BID    levETIRAcetam  2,000 mg Oral BID    metoprolol tartrate  75 mg Oral BID    rOPINIRole  1 mg Oral TID    insulin lispro  0-12 Units Subcutaneous TID WC    insulin lispro  0-6 Units Subcutaneous Nightly    lacosamide  200 mg Oral BID    lactobacillus  1 capsule Oral Daily    sodium chloride flush  5-40 mL Intravenous 2 times per day    vancomycin  1,250 mg Intravenous Q8H    enoxaparin  40 mg Subcutaneous Daily           [facility]  Lawyer Duy MD  Perfect [employer] messaging  OFFICE: [phone]      Electronically signed by Lawyer Duy MD on 7/31/2021 at 2:30 PM  Thank you for allowing us to participate in the care of this patient. Please call with questions. This note iscreated with the assistance of a speech recognition program.  While intending to generate a document that actually reflects the content of the visit, the document can still have some errors including those of syntax andsound a like substitutions which may escape proof reading. In such instances, actual meaning can be extrapolated by contextual diversion.

## 2021-07-31 NOTE — PROGRESS NOTES
right craniectomy for the resection of right frontal subarachnoid cyst in 11/2012. He follows up with neurologist in [place] and has been on 4 different AEDs due to intractable epilepsy.  divalproex  500 mg Oral BID    lactulose  20 g Oral TID    insulin glargine  47 Units Subcutaneous BID    vancomycin (VANCOCIN) intermittent dosing (placeholder)   Other RX Placeholder    zonisamide  500 mg Oral Nightly    DULoxetine  60 mg Oral Daily    pantoprazole  40 mg Oral BID    levETIRAcetam  2,000 mg Oral BID    metoprolol tartrate  75 mg Oral BID    rOPINIRole  1 mg Oral TID    insulin lispro  0-12 Units Subcutaneous TID WC    insulin lispro  0-6 Units Subcutaneous Nightly    lacosamide  200 mg Oral BID    lactobacillus  1 capsule Oral Daily    sodium chloride flush  5-40 mL Intravenous 2 times per day    vancomycin  1,250 mg Intravenous Q8H    enoxaparin  40 mg Subcutaneous Daily       Past Medical History:   Diagnosis Date    Abnormal thyroid biopsy     s/p left hemithyroidectomy 7/5488 - follicular adenoma    Acid reflux     Anemia     resolved - previously seen by Dr. Brandy Fontana (due to enlarged spleen)    Anesthesia     seizures with brain surgery due to blood sugar got really high    Bipolar disorder ([facility]     Blood clot in vein 04/07/2016    [place]     Cancer [facility]) 04/2019    thyroid    Chest pain     previously seeing [facility] cardiologist, now seeing Dr. Eva Martinez (LAD bridging on cath 4/2016)    Chronic back pain     Chronic bronchitis ([facility] [address]     Dr. Wilfrid Box    Colon polyp 08/30/2016    Depression     seeing Teodoro Lawler DVT (deep venous thrombosis) ([facility] [address] [phone]    not on Coumadin due to unable to regulate - Dr. Jona Greenwood - no etiology found per patient    Esophageal abnormality     nodule     Fatty liver disease, nonalcoholic     U/S 80/[facility]    Furuncle     legs    History of Doppler ultrasound 05/29/2011    No hemodynamically significant carotid stenosis is identified.  A thyroid nodule on each side. Dedicated ultrasound of thyroid gland is suggested to further evaluate if clinically indicated.  History of pulmonary embolism 2007    s/p GFF, related to knee surgery    Hx of blood clots     leg and lung    Hyperlipidemia     severely elevated triglycerides    Hypertension     diastolic    Intracranial arachnoid cyst 11/2012    Dr. Adriana Daily drained, complicated with seizures, DKA    Irritable bowel syndrome     Liver disease     Shemar Aw - elevated LFT - positive smooth muscle antibody, steatosis per liver bx 3/2014 with Dr. Jaswant Torrse (multiple drug resistant organisms) resistance 2012    MRSA (methicillin resistant staph aureus) culture positive     h/o in foot and before brain surgery    Nephrolithiasis     noted on CT abdomen 9/2016, 6/2019    Neuropathy     Pancreatic insufficiency     Positive SANDY (antinuclear antibody)     Dr. Kory Piña - first visit in 6/2013    Prolonged emergence from general anesthesia     Restless legs syndrome     Seizures ([address]     started with brain surgery    Sinus tachycardia     Holter 3/2013 - seeing Dr. Nataliia Dasilva fracture [facility])     clips     Sleep apnea     positive sleep apnea per study 10/2020.     Systolic dysfunction     \"systolic bridge\"  EF 06% ECHO 9/2019    Type II or unspecified type diabetes mellitus without mention of complication, not stated as uncontrolled 2007       Past Surgical History:   Procedure Laterality Date    CARDIAC CATHETERIZATION      2011,5-6 years ago   [address]  3-2012   [address]  04/28/2016    [facility]     CARPAL TUNNEL RELEASE  01/2017    CHOLECYSTECTOMY  2004    COLONOSCOPY  2012    COLONOSCOPY  08/2016    2 tubular adenomas - reportedly needs repeat scope in 6 months    CRANIOTOMY  11/2012    arachnoid cyst drainage    EKG 12-LEAD  10/18/2015         ENDOSCOPY, COLON, DIAGNOSTIC      HERNIA REPAIR Right 1996    [facility]--Dr. Veronica Myers INGUINAL HERNIA REPAIR Right 09/15/2016    Robotic assisted    KNEE ARTHROSCOPY Right 2016    KNEE SURGERY Left 2007    acl and debrided twice    OTHER SURGICAL HISTORY  5-31-11    Tilt table was associated w/ nonspecific symptoms or nausea, otherwise no significant dizziness or syncope. No significant hemodynamic changes. Otherwise, unremarkable tilt table test after 30 minutes of tilting.  OTHER SURGICAL HISTORY  01/20/2017    RIGHT SHOULDER ARTHROSCOPY, OPEN STAN, OPEN ACROMIOPLASTY, BICEP TENDESIS, RIGHT CARPAL TUNNEL RELEASE    SHOULDER SURGERY Right 01/2007    THYROID LOBECTOMY N/A 1/15/2021    THYROID LOBECTOMY, UVULOPALATOPHARYNGOPLAST LAOP performed by Lyn Wilkins MD at [address] ECHOCARDIOGRAM  3-05-11    LV size and systolic function normal. EF 55-65%.  UPPER GASTROINTESTINAL ENDOSCOPY  2012    UPPER GASTROINTESTINAL ENDOSCOPY  2016    Dr. La Calixto Left 10/22/2019    EGD DILATION SAVORY performed by Jose Bernabe MD at [address] [facility] ENDOSCOPY Left 10/22/2019    EGD BIOPSY performed by Jose Bernabe MD at [address]  2007       PHYSICAL EXAM:      Blood pressure (!) 128/94, pulse 67, temperature 98.6 °F (37 °C), temperature source Oral, resp. rate 15, height 6' 2\" (1.88 m), weight 248 lb 1.6 oz (112.5 kg), SpO2 96 %.       Neurological Examination:  Mental status   Alert and oriented to self, hospital, state and the year; impaired short-term memory, delayed responses, low verbal output, following all commands; speech is fluent, no dysarthria, aphasia   Cranial nerves   II - visual fields intact to confrontation; pupils reactive  III, IV, VI - extraocular muscles intact; no KARIS; no nystagmus; no ptosis   V - normal facial sensation                                                               VII - normal facial symmetry 18:11 7/30/2021 05:44 7/30/2021 08:51   Ammonia 109 (H) 142 (HH) 53      7/31/2021 07:58   CRP <3.0   ESR 4         DIAGNOSTIC DATA:  MRI BRAIN:    LTME (7/28 -   Severely abnormal record with evidence of bihemispheric dysfunction no active seizures recorded but clear-cut ictal propensity recording consistent with resolved status head dominant focus appears to be residual left frontal area                        IMPRESSION: 46 y.o.  male admitted with  Toxic metabolic encephalopathy in the setting of hyperammonemia (109) & RLE MRSA cellulitis; patient is on vancomycin & lactulose. Pt was alert and oriented to self, hospital, state and the year; delayed responses, low verbal output, was able to follow simple commands. Wife reported much improvement in mentation as compared to yesterday    Patient was transferred to [facility] for LTME to r/o subclinical seizures; H/O intractable seizure disorder post-R craniectomy (11/2012) d/t R frontal subarachnoid cyst resection; LTME - bi-hemispheric dysfunction with no epileptiform discharges. 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 Although every effort was made to ensure the accuracy of this automated transcription, some errors in transcription may have occurred.

## 2021-07-31 NOTE — DISCHARGE INSTR - COC
Continuity of Care Form    Patient Name: Riley Strickland   :  1970  MRN:  [id number]    Admit date:  2021  Discharge date:  21    Code Status Order: Full Code   Advance Directives:      Admitting Physician:  Zofia Car DO  PCP: Kendall Hanson MD    Discharging Nurse: Sukh Aguilar, RN  [address] Unit/Room#: 9365/1122-03  Discharging Unit Phone Number: [phone]    Emergency Contact:   Extended Emergency Contact Information  Primary Emergency Contact: Gris Mccrary [address] Phone: [phone]  Mobile Phone: [phone]  Relation: Other  Hearing or visual needs: None  Other needs: None  Preferred language: English   needed? No    Past Surgical History:  Past Surgical History:   Procedure Laterality Date    CARDIAC CATHETERIZATION      ,5-6 years ago   [address]  3-    CARDIAC CAT[facility]IZATION  2016    [facility]     CARPAL TUNNEL RELEASE  2017    CHOLECYSTECTOMY  2004    COLONOSCOPY      COLONOSCOPY  2016    2 tubular adenomas - reportedly needs repeat scope in 6 months    CRANIOTOMY  2012    arachnoid cyst drainage    EKG 12-LEAD  10/18/2015         ENDOSCOPY, COLON, DIAGNOSTIC      HERNIA REPAIR Right     [facility]--Dr. Rae Blanchard INGUINAL HERNIA REPAIR Right 09/15/2016    Robotic assisted    KNEE ARTHROSCOPY Right     KNEE SURGERY Left     acl and debrided twice    OTHER SURGICAL HISTORY  11    Tilt table was associated w/ nonspecific symptoms or nausea, otherwise no significant dizziness or syncope. No significant hemodynamic changes. Otherwise, unremarkable tilt table test after 30 minutes of tilting.      OTHER SURGICAL HISTORY  2017    RIGHT SHOULDER ARTHROSCOPY, OPEN STAN, OPEN ACROMIOPLASTY, BICEP TENDESIS, RIGHT CARPAL TUNNEL RELEASE    SHOULDER SURGERY Right 2007    THYROID LOBECTOMY N/A 1/15/2021    THYROID LOBECTOMY, UVULOPALATOPHARYNGOPLAST LAOP performed Syncope and collapse R55    Headache disorder R51.9    Partial symptomatic epilepsy with complex partial seizures, not intractable, without status epilepticus ([address] G40.209    Respiratory acidosis E87.2    Essential hypertension I10    Gastritis K29.70    Gastroesophageal reflux disease K21.9    Mixed hyperlipidemia E78.2    Acute lateral meniscus tear of right knee S83.281A    Recurrent right inguinal hernia K40.91    Bicipital tendinitis of right shoulder M75.21    Carpal tunnel syndrome of right wrist G56.01    Obesity (BMI 30-39. 9) E66.9    Lump of axilla R22.30    Familial hypercholesterolemia E78.01    Coronary artery disease of native artery of native heart with stable angina pectoris ([facility]) I25.118    Exertional dyspnea R06.00    Vitamin D deficiency E55.9    Seizure ([facility]) R56.9    Nausea and vomiting R11.2    Type II diabetes mellitus with manifestations, uncontrolled ([facility]) E11.8, E11.65    Chondromalacia of knee, right M94.261    Effusion of left knee M25.462    Elevated levels of transaminase & lactic acid dehydrogenase R74.01, R74.02    Other intervertebral disc degeneration, lumbar region M51.36    Palpitations R00.2    Splenomegaly R16.1    Sprain of right foot S93.601A    Steatohepatitis K75.81    Osteopenia M85.80    Obstructive sleep apnea W90.70    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 8XXA, L08.9    Diabetic leg ulcer ([address] E11.622, L97.909    Seizures ([address] R56.9    Partial symptomatic epilepsy with complex partial seizures, intractable, without status epilepticus ([address] G40.219    Episodic confusion R41.0       Isolation/Infection:   Isolation Contact          Patient Infection Status       Infection Onset Added Last Indicated Last Indicated By Review Planned Expiration Resolved Resolved By    MRSA 21 Culture, Anaerobic and Aerobic        Right leg 2021  Left heel 2021    Resolved    COVID-19 Rule Out 21 COVID-19 & Influenza Combo (Ordered)   21 Rule-Out Test Resulted    COVID-19 20 Covid-19 Ambulatory   20     COVID-19 Rule Out 20 Covid-19 Ambulatory (Ordered)   20 Rule-Out Test Resulted            Nurse Assessment:  Last Vital Signs: BP (!) 128/94   Pulse 67   Temp 98.6 °F (37 °C) (Oral)   Resp 15   Ht 6' 2\" (1.88 m)   Wt 248 lb 1.6 oz (112.5 kg)   SpO2 96%   BMI 31.85 kg/m²     Last documented pain score (0-10 scale): Pain Level: 0  Last Weight:   Wt Readings from Last 1 Encounters:   21 248 lb 1.6 oz (112.5 kg)     Mental Status:  oriented, alert, thought processes intact and able to concentrate and follow conversation    IV Access:  - None    Nursing Mobility/ADLs:  Walking   Dependent  Transfer  Dependent  Bathing  Assisted  Dressing  Assisted  Toileting  Assisted  Feeding  Independent  Med Admin  Assisted  Med Delivery   whole    Wound Care Documentation and Therapy:  Wound 21 Heel Left (Active)   Wound Etiology Diabetic 21 1200   Dressing Status Clean;Dry; Intact 21 1200   Wound Cleansed Cleansed with saline;Betadine/povidone iodine 21 0535   Dressing/Treatment Packing;Gauze dressing/dressing sponge;Roll gauze; Ace wrap 21 1200   Dressing Change Due 21 1200   Wound Assessment Other (Comment) 21 1200   Drainage Amount Scant 21 1200   Drainage Description Serosanguinous 21 1200   Odor None 21 1200   Anabella-wound Assessment Other (Comment) 21 1200   Margins Attached edges 21 1200   Number of days: 4       Wound 21 Leg Right; Lower; Lateral (Active)   Wound Etiology Diabetic 07/31/21 1200   Dressing Status Clean;Dry; Intact 07/31/21 1200   Dressing/Treatment Open to air 07/31/21 1200   Drainage Amount None 07/31/21 1200   Odor None 07/31/21 1200   Number of days: 4        Elimination:  Continence:   · Bowel: Yes  · Bladder: No  Urinary Catheter: None   Colostomy/Ileostomy/Ileal Conduit: No       Date of Last BM: 08/05/21    Intake/Output Summary (Last 24 hours) at 7/31/2021 1305  Last data filed at 7/31/2021 1227  Gross per 24 hour   Intake 2173.43 ml   Output 1850 ml   Net 323.43 ml     I/O last 3 completed shifts: In: 1823.4 [P.O.:120; I.V.:1703.4]  Out: 1100 [Urine:1100]    Safety Concerns:     History of Falls (last 30 days), At Risk for Falls and History of Seizures    Impairments/Disabilities:      Speech and non weight bearing left foot     Nutrition Therapy:  Current Nutrition Therapy:   - Oral Diet:  Carb Control 5 carbs/meal (2000kcals/day)    Routes of Feeding: Oral  Liquids: No Liquids  Daily Fluid Restriction: no  Last Modified Barium Swallow with Video (Video Swallowing Test): not done    Treatments at the Time of Hospital Discharge:   Respiratory Treatments: N/A  Oxygen Therapy:  is not on home oxygen therapy.   Ventilator:    - No ventilator support    Rehab Therapies: Physical Therapy, Occupational Therapy and Speech/Language Therapy  Weight Bearing Status/Restrictions: No weight bearing restirctions  Other Medical Equipment (for information only, NOT a DME order):  walker  Other Treatments: ***    Patient's personal belongings (please select all that are sent with patient):  None    RN SIGNATURE:  Electronically signed by Jenna Yadav RN on 8/5/21 at 9:36 AM EDT    CASE MANAGEMENT/SOCIAL WORK SECTION    Inpatient Status Date: ***    Readmission Risk Assessment Score:  Readmission Risk              Risk of Unplanned Readmission:  17           Discharging to Facility/ Agency   · Name: [facility]  · Address:  · Phone: [phone]  · Fax:    Dialysis Facility (if applicable)   · Name:  · Address:  · Dialysis Schedule:  · Phone:  · Fax:    / signature: Electronically signed by Eleanor Millard RN on 8/5/21 at 9:15 AM EDT    PHYSICIAN SECTION    Prognosis: Fair    Condition at Discharge: Stable    Rehab Potential (if transferring to Rehab): Fair    Recommended Labs or Other Treatments After Discharge:  Continue Depakote DR as 250 + 500 mg (also used for mood disorder); Vimpat 200 mg BID, Keppra 2 g BID PO & Zonegran 500 mg HS  Decrease lopressor dose to 25 mg  Continue lactulose, discuss depakote with neurologist, slowly weaning down  Insulin 50 units twice daily and continue home sliding scale and premeal insulin  Check BS 4 times   Daily dressing changes to left heel to include: light packing to left heel, DSD, Ace. Physician Certification: I certify the above information and transfer of Sofya Headley  is necessary for the continuing treatment of the diagnosis listed and that he requires Acute Rehab for greater 30 days.      Update Admission H&P: Changes in H&P as follows - discharge summary to follow    PHYSICIAN SIGNATURE:  Electronically signed by Geeta Jaramillo MD on 8/4/21 at 1:23 PM EDT

## 2021-07-31 NOTE — PROGRESS NOTES
[facility]  Office: [address], DO, Rolf Colorados, DO, Yasemin Balint, DO, Andrew Light Blood, DO, Renee Reece MD, Corine Wilson MD, Radha Jordan MD, Anderson Meraz MD, Ladonna Jones MD, Fartun Lockwood MD, Perri Pierce MD, Lamonte Cooks, DO, Ame Dahl MD, Lucretia Olea DO, Jerri Harkins MD,  Jennifer Jansen, DO, Koko Mike MD, Lindsay Sandoval MD, Jayla Winn MD, Anamaria Elias MD, Jazlyn Correa MD, Najma Hurtado MD, Primo Min MD, [place] [facility], [facility], CNP, Leah Acosta, CNP, Jose Purdy, CNS, Alondra Henao, Shani Dennis, CNP, eJnna Strange, CNP, Aaron Skelton, CNP, Shira Stockton, CNP, Sugar Saini PA-C, Rachele Tovar, [facility], Parish Rome, CNP, Lilia Mosqueda, CNP, Chuck Robbins, CNP, Vicenta Kc, CNP, Drea Tapia, CNP, Yung Chadwick, CNP, Paras Jauregui, CNP, [facility], [address] [place] [address]    Progress Note    7/31/2021    8:17 AM    Name:   Hao Talavera  MRN:     [id number]     Acct:      [de-identified]   Room:   [address] [place] [address] Day:  4  Admit Date:  7/27/2021  3:42 PM    PCP:   Meg Jimenez MD  Code Status:  Full Code    Subjective:     C/C: Seizure    Interval History Status: slight improvement. Patient seen and examined at bedside. Easily awoken with verbal stimuli. Answered questions appropriately. Does have his continuous EEG connected. Last ammonia level had normalized. Spouse, Maeve Eid, at bedside reports some improvement. Received levocarnitine per neurology. Also had lactulose doses. Reports having BM    Brief History:   Per my colleagues note: \"This is a 80-year-old male who was transferred here from [facility] for LTM E. He initially presented to the hospital for treatment of right lower extremity cellulitis.   He was evaluated by podiatry and started on vancomycin and Zosyn and was transition to Vancomycin after cultures positive for MRSA. It was recommended he be discharged from OSH with bactrim. Otherwise, currently being worked up for altered mental status. Found to have elevated ammonia due to depakote. Also there was concern for repeat seizure activity so pt was placed back on LTME 7/30\"    Review of Systems:       Constitutional:  negative for chills, fevers, sweats  Respiratory:  negative for cough, dyspnea on exertion, shortness of breath, wheezing  Cardiovascular:  negative for chest pain, chest pressure/discomfort, lower extremity edema, palpitations  Gastrointestinal:  negative for abdominal pain, constipation, diarrhea, nausea, vomiting  Neurological:  negative for dizziness, headache    Medications: Allergies:     Allergies   Allergen Reactions    Ciprofloxacin-Ciproflox Hcl Er Other (See Comments)     Elevated creatinine    Heparin      Monitor for thrombocytopenia    Metformin Nausea Only     Abdominal pain, diarrhea    Niacin And Related Other (See Comments)     Burning sensation, severe flushing    Tramadol Hcl      Contraindication to seizure medications    Ultram [Tramadol]      Contraindication to seizure medications    Warfarin      Very difficult to regulate in past       Current Meds:   Scheduled Meds:    levocarnitine IVPB  3,000 mg Intravenous Q8H    divalproex  500 mg Oral BID    lactulose  20 g Oral TID    insulin glargine  47 Units Subcutaneous BID    vancomycin (VANCOCIN) intermittent dosing (placeholder)   Other RX Placeholder    zonisamide  500 mg Oral Nightly    DULoxetine  60 mg Oral Daily    pantoprazole  40 mg Oral BID    levETIRAcetam  2,000 mg Oral BID    metoprolol tartrate  75 mg Oral BID    rOPINIRole  1 mg Oral TID    insulin lispro  0-12 Units Subcutaneous TID WC    insulin lispro  0-6 Units Subcutaneous Nightly    lacosamide  200 mg Oral BID    lactobacillus  1 capsule Oral Daily    sodium chloride flush  5-40 mL Intravenous 2 times per day    vancomycin  1,250 mg Intravenous Q8H    enoxaparin  40 mg Subcutaneous Daily     Continuous Infusions:    dextrose      sodium chloride       PRN Meds: glucose, dextrose, glucagon (rDNA), dextrose, sodium chloride flush, sodium chloride, potassium chloride **OR** potassium alternative oral replacement **OR** potassium chloride, magnesium sulfate, ondansetron **OR** ondansetron, polyethylene glycol, acetaminophen **OR** acetaminophen    Data:     Past Medical History:   has a past medical history of Abnormal thyroid biopsy, Acid reflux, Anemia, Anesthesia, Bipolar disorder ([address] Blood clot in vein, Cancer ([address] Chest pain, Chronic back pain, Chronic bronchitis ([address] Colon polyp, Depression, DVT (deep venous thrombosis) ([address] Esophageal abnormality, Fatty liver disease, nonalcoholic, Furuncle, History of Doppler ultrasound, History of pulmonary embolism, Hx of blood clots, Hyperlipidemia, Hypertension, Intracranial arachnoid cyst, Irritable bowel syndrome, Liver disease, MDRO (multiple drug resistant organisms) resistance, MRSA (methicillin resistant staph aureus) culture positive, Nephrolithiasis, Neuropathy, Pancreatic insufficiency, Positive SANDY (antinuclear antibody), Prolonged emergence from general anesthesia, Restless legs syndrome, Seizures (HCC), Sinus tachycardia, Skull fracture ([address] Sleep apnea, Systolic dysfunction, and Type II or unspecified type diabetes mellitus without mention of complication, not stated as uncontrolled. Social History:   reports that he has never smoked. He has never used smokeless tobacco. He reports that he does not drink alcohol and does not use drugs.      Family History:   Family History   Problem Relation Age of Onset    Heart Disease Father 61        MI    Stroke Brother     Obesity Brother     Arthritis Mother     Seizures Mother     Obesity Sister     Other Brother         pulmonary embolism    Diabetes Daughter     Seizures Brother        Vitals:  BP (!) 156/97   Pulse 61 Temp 98.8 °F (37.1 °C) (Oral)   Resp 13   Ht 6' 2\" (1.88 m)   Wt 248 lb 1.6 oz (112.5 kg)   SpO2 93%   BMI 31.85 kg/m²   Temp (24hrs), Av.4 °F (36.9 °C), Min:97.9 °F (36.6 °C), Max:98.8 °F (37.1 °C)    Recent Labs     21  0859 21  1224 21  1541 21   POCGLU 161* 211* 294* 206*       I/O (24Hr):     Intake/Output Summary (Last 24 hours) at 2021 0817  Last data filed at 2021 6023  Gross per 24 hour   Intake 1823.43 ml   Output 1100 ml   Net 723.43 ml       Labs:  Hematology:  Recent Labs     21  0544 21  0626 21  0758   WBC 5.9 7.4 7.4   RBC 4.93 4.14* 4.65   HGB 14.8 12.4* 14.0   HCT 41.9 36.7* 40.8   MCV 85.0 88.6 87.7   MCH 30.0 30.0 30.1   MCHC 35.3* 33.8 34.3   RDW 11.9 13.2 12.2   PLT See Reflexed IPF Result 253 228   MPV NOT REPORTED 10.0 9.8     Chemistry:  Recent Labs     21  1447 21  0516 21  0851    138 138   K 3.8 3.8 3.9    106 106   CO2 20 18* 22   GLUCOSE 193* 191* 182*   BUN 6 6 6   CREATININE 0.63* 0.62* 0.70   MG  --  1.9  --    ANIONGAP 11 14 10   LABGLOM >60 >60 >60   GFRAA >60 >60 >60   CALCIUM 8.5* 8.6 9.1   PHOS 3.1  --   --      Recent Labs     21  1447 21  1636 21  1158 21  1605 21  2050 21  0544 21  0851 21  0859 21  1224 21  1541 21  2009   PROT 6.8  --  7.1  --   --   --   --   --   --   --   --    LABALBU 3.4*  3.4*  --  3.6  --   --   --   --   --   --   --   --    TSH 1.72  --   --   --   --   --   --   --   --   --   --    AST 35  --  22  --   --   --   --   --   --   --   --    ALT 53*  --  46*  --   --   --   --   --   --   --   --    ALKPHOS 160*  --  156*  --   --   --   --   --   --   --   --    BILITOT 0.53  --  0.61  --   --   --   --   --   --   --   --    BILIDIR 0.16  --  0.15  --   --   --   --   --   --   --   --    AMMONIA  --   --   --   --   --  142* 53  --   --   --   --    POCGLU  --    < >  --    < > 159* 183*  --  161* 211* 294* 206*    < > = values in this interval not displayed. ABG:  Lab Results   Component Value Date    PH 7.40 06/26/2020    PH 5.5 01/04/2012    PCO2 40 06/26/2020    PO2 71 06/26/2020    HCO3 25 06/26/2020    O2SAT 94 06/26/2020     No results found for: SPECIAL  No results found for: CULTURE    Radiology:  No results found. Physical Examination:        General appearance:  Alert, cooperative and no distress. Inconsistently responding to commands  Mental Status:  oriented to person and place and time and normal affect  Lungs:  clear to auscultation bilaterally, normal effort  Heart:  regular rate and rhythm, no murmur  Abdomen:  soft, nontender, nondistended, normal bowel sounds, no masses, hepatomegaly, splenomegaly  Extremities:  no edema, redness, tenderness in the calves. Right anterior shin ulcerations, Left foot wound  Skin:  no gross lesions, rashes, induration    Assessment:        Hospital Problems         Last Modified POA    * (Principal) Seizures ([address] 7/27/2021 Yes    Hypertension 7/27/2021 Yes    Type 2 diabetes mellitus with diabetic neuropathy ([address] 7/27/2021 Yes    Neuropathy 7/27/2021 Yes    Bipolar affective disorder ([address] 7/27/2021 Yes    Partial symptomatic epilepsy with complex partial seizures, not intractable, without status epilepticus ([address] 7/27/2021 Yes    Gastroesophageal reflux disease 7/27/2021 Yes    Diabetic leg ulcer ([address] 7/27/2021 Yes    Partial symptomatic epilepsy with complex partial seizures, intractable, without status epilepticus ([address] 7/28/2021 Yes    Episodic confusion 7/28/2021 Yes          Plan:        1. Encephalopathy with unclear etiology. Can be secondary to toxic metabolic encephalopathy due to cellulitis. Patient has a history of MRSA with right leg ulcerations as well as left heel wound. Podiatry consult pending. ID on board. Neurology work-up in process. MRI pending. LTME connected. 2. On IV antibiotics.   Adjust per ID recommendations when able. Dressing changes. Further recommendation per podiatry  3. A1c 8.3. On basal insulin along with sliding scale coverage. Monitor for hypoglycemia. Resume home gabapentin dosage if ok with neurology  4. Seizure history with neurology on board. Continue AEDs as recommended. LTME management per neuro reccs  5. Continue current antihypertensives  6.  DVT/GI prophylaxis    Clemente Lambert MD  7/31/2021  8:17 AM

## 2021-07-31 NOTE — CONSULTS
Consultation Note  Podiatric Medicine and Surgery     Subjective     Chief Complaint: Altered mental status and left foot wound    HPI:  Zeeshan Christensen is a 46 y.o. male seen at [facility] as a consult for a left heel wound. Patient is admitted to the hospital with altered mental status, HPI was performed using chart review and discussion with wife who was at bedside. Wife reports that approximately 1 week ago patient underwent an incision and drainage of the left heel which was subsequently closed with a small opening to allow for drainage. This wound was packed with plain packing. Patient's wife reports that they were about ready to be discharged from the outside hospital when patient developed altered mental status and was transferred to [facility] for neurological evaluation.     PCP is Bhupinder Vazquez MD    ROS:   Review of Systems   Unable to perform ROS: Mental status change       Past Medical History   has a past medical history of Abnormal thyroid biopsy, Acid reflux, Anemia, Anesthesia, Bipolar disorder ([address] Blood clot in vein, Cancer ([address] Chest pain, Chronic back pain, Chronic bronchitis ([address] Colon polyp, Depression, DVT (deep venous thrombosis) ([address] Esophageal abnormality, Fatty liver disease, nonalcoholic, Furuncle, History of Doppler ultrasound, History of pulmonary embolism, Hx of blood clots, Hyperlipidemia, Hypertension, Intracranial arachnoid cyst, Irritable bowel syndrome, Liver disease, MDRO (multiple drug resistant organisms) resistance, MRSA (methicillin resistant staph aureus) culture positive, Nephrolithiasis, Neuropathy, Pancreatic insufficiency, Positive SANDY (antinuclear antibody), Prolonged emergence from general anesthesia, Restless legs syndrome, Seizures ([address] Sinus tachycardia, Skull fracture ([address] Sleep apnea, Systolic dysfunction, and Type II or unspecified type diabetes mellitus without mention of complication, not stated as uncontrolled. Past Surgical History   has a past surgical history that includes knee surgery (Left, 2007); Vena Cava Filter Placement (2007); hernia repair (Right, 1996); Cholecystectomy (2004); Upper gastrointestinal endoscopy (2012); transthoracic echocardiogram (3-05-11); other surgical history (5-31-11); Cardiac catheterization; Cardiac catheterization (3-2012); craniotomy (11/2012); Tonsillectomy; Endoscopy, colon, diagnostic; EKG 12 Lead (10/18/2015); Knee arthroscopy (Right, 2016); Cardiac catheterization (04/28/2016); Upper gastrointestinal endoscopy (2016); Inguinal hernia repair (Right, 09/15/2016); Colonoscopy (2012); Colonoscopy (08/2016); other surgical history (01/20/2017); shoulder surgery (Right, 01/2007); Carpal tunnel release (01/2017); Upper gastrointestinal endoscopy (Left, 10/22/2019); Upper gastrointestinal endoscopy (Left, 10/22/2019); and Thyroid lobectomy (N/A, 1/15/2021). Medications  Prior to Admission medications    Medication Sig Start Date End Date Taking?  Authorizing Provider   insulin lispro (HUMALOG) 100 UNIT/ML injection vial Takes 10 units with meals plus group 2 sliding scale    Historical Provider, MD   promethazine (PHENERGAN) 12.5 MG tablet Take 1-2 tablets by mouth every 8 hours as needed for Nausea 4/13/21   Lennie Ayoub MD   blood glucose test strips (ONETOUCH ULTRA) strip 1 each by In Vitro route 3 times daily DX: E11.65 Dispense Onetouch Ultra 2 test strips 4/13/21   Lennie Ayoub MD   pantoprazole (PROTONIX) 40 MG tablet Take 1 tablet by mouth 2 times daily 3/8/21   Lennie Ayoub MD   vitamin D (ERGOCALCIFEROL) 1.25 MG (03659 UT) CAPS capsule Take 1 capsule by mouth once a week 3/8/21   Lennie Ayoub MD   insulin lispro, 1 Unit Dial, (HUMALOG [facility]) 100 UNIT/ML SOPN Inject 10 Units into the skin 3 times daily Plus sliding scale 2 1/25/21 3/2/21  Severa Byers, MD   insulin glargine (LANTUS SOLOSTAR) 100 UNIT/ML injection pen Inject 75 Units into the skin 2 times daily    Historical Provider, MD   gabapentin (NEURONTIN) 600 MG tablet Take 600 mg by mouth 4 times daily. Historical Provider, MD   rOPINIRole (REQUIP) 1 MG tablet TAKE 1 TABLET BY MOUTH THREE TIMES A DAY 12/22/20   Brock Palomino MD   metoprolol tartrate (LOPRESSOR) 50 MG tablet Take 1.5 tablets by mouth 2 times daily  Patient taking differently: Take 50 mg by mouth 2 times daily  8/17/20   Brock Palomino MD   empagliflozin (JARDIANCE) 25 MG tablet Take 25 mg by mouth daily 9/18/19   Brock Palomino MD   DULoxetine (CYMBALTA) 60 MG extended release capsule Take by mouth daily 2 tab    Historical Provider, MD   lacosamide (VIMPAT) 100 MG TABS tablet Take 200 mg by mouth 2 times daily.      Historical Provider, MD   sildenafil (VIAGRA) 100 MG tablet Take 1 tablet by mouth as needed for Erectile Dysfunction 1/29/19   Brock Palomino MD   divalproex (DEPAKOTE) 500 MG DR tablet Take 500 mg by mouth 2 times daily  4/5/18   Historical Provider, MD   levETIRAcetam (KEPPRA) 500 MG tablet Take 2,000 mg by mouth 2 times daily     Historical Provider, MD   zonisamide (ZONEGRAN) 100 MG capsule Take by mouth nightly 5 capsules    Historical Provider, MD    Scheduled Meds:   divalproex  500 mg Oral BID    lactulose  20 g Oral TID    insulin glargine  47 Units Subcutaneous BID    vancomycin (VANCOCIN) intermittent dosing (placeholder)   Other RX Placeholder    zonisamide  500 mg Oral Nightly    DULoxetine  60 mg Oral Daily    pantoprazole  40 mg Oral BID    levETIRAcetam  2,000 mg Oral BID    metoprolol tartrate  75 mg Oral BID    rOPINIRole  1 mg Oral TID    insulin lispro  0-12 Units Subcutaneous TID WC    insulin lispro  0-6 Units Subcutaneous Nightly    lacosamide  200 mg Oral BID    lactobacillus  1 capsule Oral Daily    sodium chloride flush  5-40 mL Intravenous 2 times per day    vancomycin  1,250 mg Intravenous Q8H    enoxaparin  40 mg Subcutaneous Daily 07/30/21  0851 07/31/21  0758    138 139   K 3.8 3.9 3.6*    106 109*   CO2 18* 22 22   BUN 6 6 6   CREATININE 0.62* 0.70 0.64*   GLUCOSE 191* 182* 182*   CALCIUM 8.6 9.1 8.9        Coags:  Recent Labs     07/28/21  1447 07/29/21  1158   PROT 6.8 7.1       Lab Results   Component Value Date    LABA1C 8.3 (H) 07/20/2021     Lab Results   Component Value Date    SEDRATE 2 03/21/2019     Lab Results   Component Value Date    CRP 3.46 (H) 07/20/2021         Left lower Extremity Physical Exam:  Vascular: DP and PT pulses are palpable. CFT <3 seconds to all digits. Hair growth is absent to the level of the digits. No edema. Neuro: Unable to perform due to patient's altered mental status    Musculoskeletal: Muscle strength unable to be performed due to altered mental status. No apparent pain to palpation to left foot. No gross structural abnormalities appreciated. Dermatologic: Incision is noted along the medial aspect of the heel, incision measures approximately 5 cm in length and is well approximated with the exception of an apex at the most plantar aspect of the heel where the incision was left open for drainage. There is no periwound maceration or erythema, no increase in warmth. There is a dead space that probes approximately 1.5 cm deep but with no probe to bone. No purulence is appreciated, no foul odor is appreciated. No crepitus or undermining with the exception of what was previously noted    Clinical Images:          Imaging:   MRI BRAIN WO CONTRAST    (Results Pending)       Cultures: None    Assessment     Tayler Lindsey is a 46 y.o. male with   1. Altered mental status  2.  Status post incision and drainage of the left heel    Principal Problem:    Seizures ([address]  Active Problems:    Hypertension    Type 2 diabetes mellitus with diabetic neuropathy (HCC)    Neuropathy    Bipolar affective disorder (HCC)    Partial symptomatic epilepsy with complex partial seizures, not intractable, without status epilepticus ([facility]    Gastroesophageal reflux disease    Diabetic leg ulcer (HCC)    Partial symptomatic epilepsy with complex partial seizures, intractable, without status epilepticus ([facility]    Episodic confusion  Resolved Problems:    * No resolved hospital problems. *        Plan     · Patient examined and evaluated at bedside   · Treatment options discussed in detail with the patient's wife. · Patient's wound is healing nicely, given the dead space of the heel will continue to pack the wound. · Patient is allowed to put weight to the forefoot only, given the patient's current mental status nonweightbearing to left lower extremity should be pursued until patient is back to baseline. · Nursing to continue changing the dressings to left foot to include: Packing, DSD, Ace. · Patient is stable from podiatry perspective, patient requires no surgical intervention at this time. Podiatry will sign off, please do not hesitate to contact the podiatry resident on-call if with any questions or concerns. · Patient care discussed with Dr. Morteza New. Monique Nicole DPM   Podiatric Medicine & Surgery   7/31/2021 at 11:12 AM       I performed a history and physical examination of the patient and discussed management with the resident. I reviewed the residents note and agree with the documented findings and plan of care. Any areas of disagreement are noted on the chart. I was personally present for the key portions of any procedures. I have documented in the chart those procedures where I was not present during the key portions. I have reviewed the Podiatry Resident progress note. I agree with the chief complaint, past medical history, past surgical history, allergies, medications, social and family history as documented unless otherwise noted below.  Documentation of the HPI, Physical Exam and Medical Decision Making performed by medical students or scribes is based on my personal performance of the HPI, PE and MDM. I have personally evaluated this patient and have completed at least one if not all key elements of the E/M (history, physical exam, and MDM). Additional findings are as noted.      Electronically signed by Anna Grossman DPM on 7/31/2021

## 2021-07-31 NOTE — PROGRESS NOTES
Physical Therapy  Facility/Department: [facility]  Daily Treatment Note  NAME: Riley Strickland  : 1970  MRN: [id number]    Date of Service: 2021    Discharge Recommendations:  Patient would benefit from continued therapy after discharge    Assessment   Body structures, Functions, Activity limitations: Decreased functional mobility ; Decreased endurance;Decreased strength  Assessment: Pt overall CGA for bed mobility tolerated ~15mins at EOB with improved sitting balance. Performed therapuetic exercises , limited by fatigue and decrease endurance. Pt will benefit from continued therapy to address deficits. Prognosis: Good  PT Education: Goals;Plan of Care;General Safety;Home Exercise Program;Functional Mobility Training  REQUIRES PT FOLLOW UP: Yes  Activity Tolerance  Activity Tolerance: Patient limited by fatigue;Patient limited by endurance     Patient Diagnosis(es): There were no encounter diagnoses. has a past medical history of Abnormal thyroid biopsy, Acid reflux, Anemia, Anesthesia, Bipolar disorder ([address] Blood clot in vein, Cancer ([address] Chest pain, Chronic back pain, Chronic bronchitis ([address] Colon polyp, Depression, DVT (deep venous thrombosis) ([address] Esophageal abnormality, Fatty liver disease, nonalcoholic, Furuncle, History of Doppler ultrasound, History of pulmonary embolism, Hx of blood clots, Hyperlipidemia, Hypertension, Intracranial arachnoid cyst, Irritable bowel syndrome, Liver disease, MDRO (multiple drug resistant organisms) resistance, MRSA (methicillin resistant staph aureus) culture positive, Nephrolithiasis, Neuropathy, Pancreatic insufficiency, Positive SANDY (antinuclear antibody), Prolonged emergence from general anesthesia, Restless legs syndrome, Seizures ([address] Sinus tachycardia, Skull fracture ([address] Sleep apnea, Systolic dysfunction, and Type II or unspecified type diabetes mellitus without mention of complication, not stated as uncontrolled.    has a past surgical history that includes knee surgery (Left, 2007); Vena Cava Filter Placement (2007); hernia repair (Right, 1996); Cholecystectomy (2004); Upper gastrointestinal endoscopy (2012); transthoracic echocardiogram (3-05-11); other surgical history (5-31-11); Cardiac catheterization; Cardiac catheterization (3-2012); craniotomy (11/2012); Tonsillectomy; Endoscopy, colon, diagnostic; EKG 12 Lead (10/18/2015); Knee arthroscopy (Right, 2016); Cardiac catheterization (04/28/2016); Upper gastrointestinal endoscopy (2016); Inguinal hernia repair (Right, 09/15/2016); Colonoscopy (2012); Colonoscopy (08/2016); other surgical history (01/20/2017); shoulder surgery (Right, 01/2007); Carpal tunnel release (01/2017); Upper gastrointestinal endoscopy (Left, 10/22/2019); Upper gastrointestinal endoscopy (Left, 10/22/2019); and Thyroid lobectomy (N/A, 1/15/2021). Restrictions  Restrictions/Precautions  Restrictions/Precautions: Weight Bearing  Required Braces or Orthoses?: No  Lower Extremity Weight Bearing Restrictions  Right Lower Extremity Weight Bearing: Weight Bearing As Tolerated  Left Lower Extremity Weight Bearing: Non Weight Bearing  Position Activity Restriction  Other position/activity restrictions: Up with assist  Subjective   General  Response To Previous Treatment: Patient with no complaints from previous session. Family / Caregiver Present: Yes (wife)  Subjective  Subjective: RN and pt agreeable to PT.  Pt sleeping upon arrival, wife at bed side, easily aroused, denies pain, pleasant and cooperative t/o  General Comment  Comments: Pt more alert this date and following command appropriately  Pain Screening  Patient Currently in Pain: Denies  Vital Signs  Patient Currently in Pain: Denies       Orientation  Orientation  Overall Orientation Status: Within Functional Limits  Cognition      Objective   Bed mobility  Supine to Sit: Contact guard assistance  Sit to Supine: Contact guard assistance  Scooting: Contact guard assistance  Comment: Pt very impulsive , but easily redirected. HOB elavted. Transfers  Comment: Pt reported fatigue after the. ex. requested to return to bed. Balance  Posture: Fair  Sitting - Static: Good;-  Sitting - Dynamic: Good;-  Exercises  Comments:Seated LE exercise program: Long Arc Quads, hip abduction/adduction, heel/toe raises, and marches.  Reps: 10x 2sets each       AM-PAC Score   AM-PAC Inpatient Mobility Raw Score : 16 (07/29/21 1454)  AM-PAC Inpatient T-Scale Score : 40.78 (07/29/21 1454)  Mobility Inpatient CMS 0-100% Score: 54.16 (07/29/21 1454)  Mobility Inpatient CMS G-Code Modifier : CK (07/29/21 1454)       Goals  Short term goals  Time Frame for Short term goals: 10  visits  Short term goal 1: transfers with SBA  Short term goal 2: amb 25 ft with a RW x CCGA with NWB L LE WBAT R LE  Short term goal 3: to be independent with bed mobility  Short term goal 4: 20 min exercise program x SBA  Patient Goals   Patient goals : Return home    Plan    Plan  Times per week: 5-6x wk  Times per day: Daily  Current Treatment Recommendations: Strengthening, Functional Mobility Training, Transfer Training, Gait Training, Endurance Training, Pain Management, Safety Education & Training  Safety Devices  Type of devices: Nurse notified, Left in bed, Call light within reach  Restraints  Initially in place: No     Therapy Time   Individual Concurrent Group Co-treatment   Time In 1150         Time Out 1213         Minutes 23         Timed Code Treatment Minutes: [address], PTA

## 2021-07-31 NOTE — PLAN OF CARE
Problem: Physical Regulation:  Goal: Will remain free from infection  Description: Will remain free from infection  7/31/2021 0224 by Ruben Barker RN  Outcome: Ongoing     Problem: Safety:  Goal: Ability to remain free from injury will improve  Description: Ability to remain free from injury will improve  7/31/2021 0224 by Ruben Barker RN  Outcome: Ongoing     Problem: Self-Concept:  Goal: Level of anxiety will decrease  Description: Level of anxiety will decrease  7/31/2021 0224 by Ruben Barker RN  Outcome: Ongoing

## 2021-07-31 NOTE — PROCEDURES
VIDEO-EEG MONITORING REPORT      Patient Name: Ry Driscoll  Dates recorded: 7/31/2021  MRN: [id number]    Electroencephalographer: Maicol Pacheco MD       CLINICAL DETAILS:  This EEG was performed on this 46 y. o.yo male admitted for persistent seizures video study to explore semiology of events   recording begins 1345 hrs.     MEDICATIONS:   Current Facility-Administered Medications:     [COMPLETED] levOCARNitine 6,000 mg in sodium chloride 0.9 % 250 mL IVPB, 6,000 mg, Intravenous, Once, Stopped at 07/30/21 1133 **FOLLOWED BY** levOCARNitine 3,000 mg in sodium chloride 0.9 % 250 mL IVPB, 3,000 mg, Intravenous, Q8H, Regine Lopez MD, Stopped at 07/31/21 0225    divalproex (DEPAKOTE) DR tablet 500 mg, 500 mg, Oral, BID, Dayanara Tariq MD, 500 mg at 07/30/21 2025    lactulose (CHRONULAC) 10 GM/15ML solution 20 g, 20 g, Oral, TID, Jossy Díaz DO, 20 g at 07/30/21 2029    insulin glargine (LANTUS) injection vial 47 Units, 47 Units, Subcutaneous, BID, Lenny Sara DO, 47 Units at 07/30/21 2035    vancomycin (VANCOCIN) intermittent dosing (placeholder), , Other, RX Placeholder, Jossy Johnson DO    zonisamide (ZONEGRAN) capsule 500 mg, 500 mg, Oral, Nightly, Lamount Boom, APRN - CNS, 500 mg at 07/30/21 2027    DULoxetine (CYMBALTA) extended release capsule 60 mg, 60 mg, Oral, Daily, Lamount Boom, APRN - CNS, 60 mg at 07/30/21 0913    pantoprazole (PROTONIX) tablet 40 mg, 40 mg, Oral, BID, Lamount Boom, APRN - CNS, 40 mg at 07/30/21 2024    levETIRAcetam (KEPPRA) tablet 2,000 mg, 2,000 mg, Oral, BID, Lamount Boom, APRN - CNS, 2,000 mg at 07/30/21 2025    metoprolol tartrate (LOPRESSOR) tablet 75 mg, 75 mg, Oral, BID, Lamount Boom, APRN - CNS, 75 mg at 07/30/21 2024    rOPINIRole (REQUIP) tablet 1 mg, 1 mg, Oral, TID, Lamount Boom, APRN - CNS, 1 mg at 07/30/21 2024    insulin lispro (HUMALOG) injection vial 0-12 Units, 0-12 Units, Subcutaneous, TID WC, Lamount Boom, APRN - CNS, 6 Units at 07/30/21 1631    insulin lispro (HUMALOG) injection vial 0-6 Units, 0-6 Units, Subcutaneous, Nightly, NICANOR Aponte, 2 Units at 07/30/21 2035    glucose (GLUTOSE) 40 % oral gel 15 g, 15 g, Oral, PRN, NICANOR Aponte    dextrose 50 % IV solution, 12.5 g, Intravenous, PRN, NICANOR Aponte    glucagon (rDNA) injection 1 mg, 1 mg, Intramuscular, PRN, NICANOR Aponte    dextrose 5 % solution, 100 mL/hr, Intravenous, PRN, NICANOR Aponte    lacosamide (VIMPAT) tablet 200 mg, 200 mg, Oral, BID, NICANOR Aponte, 200 mg at 07/30/21 2025    lactobacillus (CULTURELLE) capsule 1 capsule, 1 capsule, Oral, Daily, NICANOR Aponte, 1 capsule at 07/30/21 0912    sodium chloride flush 0.9 % injection 5-40 mL, 5-40 mL, Intravenous, 2 times per day, NICANOR Aponte, 10 mL at 07/30/21 6005    sodium chloride flush 0.9 % injection 10 mL, 10 mL, Intravenous, PRN, NCIANOR Aponte    0.9 % sodium chloride infusion, 25 mL, Intravenous, PRN, NICANOR Aponte    potassium chloride (KLOR-CON M) extended release tablet 40 mEq, 40 mEq, Oral, PRN **OR** potassium bicarb-citric acid (EFFER-K) effervescent tablet 40 mEq, 40 mEq, Oral, PRN **OR** potassium chloride 10 mEq/100 mL IVPB (Peripheral Line), 10 mEq, Intravenous, PRN, NICANOR Aponte    magnesium sulfate 1000 mg in dextrose 5% 100 mL IVPB, 1,000 mg, Intravenous, PRN, NICANOR Aponte - CNS    ondansetron (ZOFRAN-ODT) disintegrating tablet 4 mg, 4 mg, Oral, Q8H PRN **OR** ondansetron (ZOFRAN) injection 4 mg, 4 mg, Intravenous, Q6H PRN, NICANOR Aponte - CNS    polyethylene glycol (GLYCOLAX) packet 17 g, 17 g, Oral, Daily PRN, NICANOR Aponte - CNS    acetaminophen (TYLENOL) tablet 650 mg, 650 mg, Oral, Q6H PRN, 650 mg at 07/31/21 0523 **OR** acetaminophen (TYLENOL) suppository 650 mg, 650 mg, Rectal, Q6H PRN, Junito Reneeting, APRN - CNS    vancomycin (VANCOCIN) 1250 mg in dextrose 5 % 250 mL IVPB, 1,250 mg, Intravenous, Q8H, Jossy Díaz DO, Stopped at 07/31/21 0649    enoxaparin (LOVENOX) injection 40 mg, 40 mg, Subcutaneous, Daily, Ted Alegria, APRN - NP, 40 mg at 07/30/21 3093      TECHNICAL DETAILS:  Continuous video-EEG monitoring was performed with 27 surface scalp electrodes placed according to the International 10-20 electrode placement system, using a 32-channel Soniqplay headbox. All EEG and video information was acquired digitally, including the use of automated spike and seizure detection software to detect epileptiform activity. An event button was also available to be depressed during clinical events. RESULTS:    This was an abnormal EEG due to:  1. Continuous generalized attenuated 0.5 -1 hz delta slowing of the background with occasional faster frequencies that shows minimal reactivity. Rhythmic bifrontal sharpslow sharp discharges occurring every 1 to 2 seconds are noted in the left frontal region consistent with PLEDS. These extend across the midline and are reflected in the right frontal area as well  2. No clear anterior-posterior gradient  3. No clear sleep-wake cycle    ACTIVATION PROCEDURES  Hyperventilation and photic stimulation: not performed     EKG  A single EKG channel showed a generally regular rhythm throughout the recording    COMPARISON  None    EVENTS RECORDED NONE     Initial review 4636-5210 hrs. Day 2  Time of review 1600 hrs through 0930 hrs. As the tracing continues, similar background activity persists and there is the development at times of high voltage more rhythmic right frontal slowing  and some degree of resolution of the PLED type activity.   No recorded seizures  IMPRESSION   Severely abnormal record with evidence of bihemispheric dysfunction no active seizures recorded but clear-cut ictal propensity recording consistent with resolved status head dominant focus appears to be residual left frontal area. As the tracing continues, impression remains the same    DAY3  Time Review  0930- 0730 hours  As the tracing continues the background activity is unchanged. The recording continues to demonstrate mixed polymorphic and mono rhythmic slow activity    No seizures are recorded. Change in the study is noted at03 30 and video confirms at that juncture that the patient has removed the electrode cap    Recording reviewed through 0730 hrs where technologist ascertained the above    Impression    DAY 4  Time see last days note    LTEEG recommenced at 11.46 hrs reviewed thru 0830 hours  As the tracing continues, the study continues to be dominated by diffuse theta range slowing with superimposed mono rhythmic delta activity which is seen more anteriorly. There has not been substantial evolution of this study. There continues to be very poor anterior posterior gradient    Rudimentary sleep is identified but no well-developed sleep complexes  It is noted in the latter portions of the study there is repetitive sharp activity apparently emanating from the left hemisphere however this is artifact and is exactly synchronous with the EKG    Impression    Abnormal continuous  EEG with evidence of diffuse generalized disturbance in electrocortical function.  The overall pattern has not seen evolution over the course of this recording  There may be toxic metabolic components and it is noted the patient has hyperammonemia which can be seen in the context of valproic acid usage    Safia Knutson MD [facility], [address] [place] [address] Rd., [address] of Psychiatry and Neurology

## 2021-07-31 NOTE — PLAN OF CARE
Problem: Falls - Risk of:  Goal: Will remain free from falls  Description: Will remain free from falls  Outcome: Met This Shift     Problem: Nutritional:  Goal: Nutritional status will improve  Description: Nutritional status will improve  Outcome: Ongoing     Problem: Physical Regulation:  Goal: Ability to maintain vital signs within normal range will improve  Description: Ability to maintain vital signs within normal range will improve  Outcome: Ongoing     Problem: Skin Integrity:  Goal: Demonstration of wound healing without infection will improve  Description: Demonstration of wound healing without infection will improve  Outcome: Ongoing

## 2021-08-01 LAB
ABSOLUTE EOS #: 0.2 K/UL (ref 0–0.44)
ABSOLUTE IMMATURE GRANULOCYTE: 0.12 K/UL (ref 0–0.3)
ABSOLUTE LYMPH #: 1.97 K/UL (ref 1.1–3.7)
ABSOLUTE MONO #: 0.61 K/UL (ref 0.1–1.2)
ANION GAP SERPL CALCULATED.3IONS-SCNC: 13 MMOL/L (ref 9–17)
BASOPHILS # BLD: 1 % (ref 0–2)
BASOPHILS ABSOLUTE: 0.04 K/UL (ref 0–0.2)
BUN BLDV-MCNC: 6 MG/DL (ref 6–20)
BUN/CREAT BLD: ABNORMAL (ref 9–20)
CALCIUM SERPL-MCNC: 8.7 MG/DL (ref 8.6–10.4)
CHLORIDE BLD-SCNC: 109 MMOL/L (ref 98–107)
CO2: 20 MMOL/L (ref 20–31)
CREAT SERPL-MCNC: 0.56 MG/DL (ref 0.7–1.2)
DIFFERENTIAL TYPE: ABNORMAL
EOSINOPHILS RELATIVE PERCENT: 3 % (ref 1–4)
GFR AFRICAN AMERICAN: >60 ML/MIN
GFR NON-AFRICAN AMERICAN: >60 ML/MIN
GFR SERPL CREATININE-BSD FRML MDRD: ABNORMAL ML/MIN/{1.73_M2}
GFR SERPL CREATININE-BSD FRML MDRD: ABNORMAL ML/MIN/{1.73_M2}
GLUCOSE BLD-MCNC: 142 MG/DL (ref 70–99)
GLUCOSE BLD-MCNC: 147 MG/DL (ref 75–110)
GLUCOSE BLD-MCNC: 186 MG/DL (ref 75–110)
GLUCOSE BLD-MCNC: 198 MG/DL (ref 75–110)
GLUCOSE BLD-MCNC: 199 MG/DL (ref 75–110)
HCT VFR BLD CALC: 40.3 % (ref 40.7–50.3)
HEMOGLOBIN: 14.1 G/DL (ref 13–17)
IMMATURE GRANULOCYTES: 2 %
LYMPHOCYTES # BLD: 26 % (ref 24–43)
MCH RBC QN AUTO: 29.9 PG (ref 25.2–33.5)
MCHC RBC AUTO-ENTMCNC: 35 G/DL (ref 28.4–34.8)
MCV RBC AUTO: 85.6 FL (ref 82.6–102.9)
MONOCYTES # BLD: 8 % (ref 3–12)
NRBC AUTOMATED: 0 PER 100 WBC
PDW BLD-RTO: 11.9 % (ref 11.8–14.4)
PLATELET # BLD: 263 K/UL (ref 138–453)
PLATELET ESTIMATE: ABNORMAL
PMV BLD AUTO: 10.4 FL (ref 8.1–13.5)
POTASSIUM SERPL-SCNC: 3.4 MMOL/L (ref 3.7–5.3)
RBC # BLD: 4.71 M/UL (ref 4.21–5.77)
RBC # BLD: ABNORMAL 10*6/UL
SEG NEUTROPHILS: 60 % (ref 36–65)
SEGMENTED NEUTROPHILS ABSOLUTE COUNT: 4.62 K/UL (ref 1.5–8.1)
SODIUM BLD-SCNC: 142 MMOL/L (ref 135–144)
WBC # BLD: 7.6 K/UL (ref 3.5–11.3)
WBC # BLD: ABNORMAL 10*3/UL

## 2021-08-01 PROCEDURE — 6360000002 HC RX W HCPCS: Performed by: NURSE PRACTITIONER

## 2021-08-01 PROCEDURE — APPSS30 APP SPLIT SHARED TIME 16-30 MINUTES: Performed by: NURSE PRACTITIONER

## 2021-08-01 PROCEDURE — 99232 SBSQ HOSP IP/OBS MODERATE 35: CPT | Performed by: PSYCHIATRY & NEUROLOGY

## 2021-08-01 PROCEDURE — 2580000003 HC RX 258: Performed by: INTERNAL MEDICINE

## 2021-08-01 PROCEDURE — 6370000000 HC RX 637 (ALT 250 FOR IP): Performed by: CLINICAL NURSE SPECIALIST

## 2021-08-01 PROCEDURE — 2580000003 HC RX 258: Performed by: CLINICAL NURSE SPECIALIST

## 2021-08-01 PROCEDURE — 6370000000 HC RX 637 (ALT 250 FOR IP): Performed by: FAMILY MEDICINE

## 2021-08-01 PROCEDURE — 6370000000 HC RX 637 (ALT 250 FOR IP): Performed by: INTERNAL MEDICINE

## 2021-08-01 PROCEDURE — 6370000000 HC RX 637 (ALT 250 FOR IP)

## 2021-08-01 PROCEDURE — 80048 BASIC METABOLIC PNL TOTAL CA: CPT

## 2021-08-01 PROCEDURE — 82947 ASSAY GLUCOSE BLOOD QUANT: CPT

## 2021-08-01 PROCEDURE — 99232 SBSQ HOSP IP/OBS MODERATE 35: CPT | Performed by: FAMILY MEDICINE

## 2021-08-01 PROCEDURE — 94761 N-INVAS EAR/PLS OXIMETRY MLT: CPT

## 2021-08-01 PROCEDURE — 2060000000 HC ICU INTERMEDIATE R&B

## 2021-08-01 PROCEDURE — 85025 COMPLETE CBC W/AUTO DIFF WBC: CPT

## 2021-08-01 PROCEDURE — 36415 COLL VENOUS BLD VENIPUNCTURE: CPT

## 2021-08-01 PROCEDURE — 6370000000 HC RX 637 (ALT 250 FOR IP): Performed by: NURSE PRACTITIONER

## 2021-08-01 PROCEDURE — 6360000002 HC RX W HCPCS: Performed by: INTERNAL MEDICINE

## 2021-08-01 PROCEDURE — 99232 SBSQ HOSP IP/OBS MODERATE 35: CPT | Performed by: INTERNAL MEDICINE

## 2021-08-01 RX ADMIN — SODIUM CHLORIDE, PRESERVATIVE FREE 10 ML: 5 INJECTION INTRAVENOUS at 08:55

## 2021-08-01 RX ADMIN — Medication 1250 MG: at 04:46

## 2021-08-01 RX ADMIN — DULOXETINE HYDROCHLORIDE 60 MG: 30 CAPSULE, DELAYED RELEASE ORAL at 08:55

## 2021-08-01 RX ADMIN — Medication 1250 MG: at 13:33

## 2021-08-01 RX ADMIN — ROPINIROLE HYDROCHLORIDE 1 MG: 1 TABLET, FILM COATED ORAL at 08:53

## 2021-08-01 RX ADMIN — METOPROLOL TARTRATE 25 MG: 25 TABLET ORAL at 19:53

## 2021-08-01 RX ADMIN — PANTOPRAZOLE SODIUM 40 MG: 40 TABLET, DELAYED RELEASE ORAL at 19:54

## 2021-08-01 RX ADMIN — PANTOPRAZOLE SODIUM 40 MG: 40 TABLET, DELAYED RELEASE ORAL at 08:53

## 2021-08-01 RX ADMIN — Medication 1 CAPSULE: at 08:54

## 2021-08-01 RX ADMIN — POTASSIUM CHLORIDE 40 MEQ: 1500 TABLET, EXTENDED RELEASE ORAL at 09:37

## 2021-08-01 RX ADMIN — INSULIN LISPRO 2 UNITS: 100 INJECTION, SOLUTION INTRAVENOUS; SUBCUTANEOUS at 16:38

## 2021-08-01 RX ADMIN — METOPROLOL TARTRATE 25 MG: 25 TABLET ORAL at 11:42

## 2021-08-01 RX ADMIN — INSULIN LISPRO 2 UNITS: 100 INJECTION, SOLUTION INTRAVENOUS; SUBCUTANEOUS at 08:57

## 2021-08-01 RX ADMIN — ROPINIROLE HYDROCHLORIDE 1 MG: 1 TABLET, FILM COATED ORAL at 14:43

## 2021-08-01 RX ADMIN — LEVETIRACETAM 2000 MG: 500 TABLET, FILM COATED ORAL at 19:54

## 2021-08-01 RX ADMIN — LACOSAMIDE 200 MG: 100 TABLET, FILM COATED ORAL at 19:54

## 2021-08-01 RX ADMIN — GABAPENTIN 400 MG: 400 CAPSULE ORAL at 19:54

## 2021-08-01 RX ADMIN — GABAPENTIN 400 MG: 400 CAPSULE ORAL at 13:33

## 2021-08-01 RX ADMIN — DIVALPROEX SODIUM 500 MG: 500 TABLET, DELAYED RELEASE ORAL at 19:54

## 2021-08-01 RX ADMIN — INSULIN LISPRO 1 UNITS: 100 INJECTION, SOLUTION INTRAVENOUS; SUBCUTANEOUS at 19:42

## 2021-08-01 RX ADMIN — GABAPENTIN 400 MG: 400 CAPSULE ORAL at 16:38

## 2021-08-01 RX ADMIN — LEVETIRACETAM 2000 MG: 500 TABLET, FILM COATED ORAL at 08:53

## 2021-08-01 RX ADMIN — DIVALPROEX SODIUM 500 MG: 500 TABLET, DELAYED RELEASE ORAL at 08:55

## 2021-08-01 RX ADMIN — Medication 1250 MG: at 20:01

## 2021-08-01 RX ADMIN — ENOXAPARIN SODIUM 40 MG: 40 INJECTION SUBCUTANEOUS at 08:55

## 2021-08-01 RX ADMIN — GABAPENTIN 400 MG: 400 CAPSULE ORAL at 08:54

## 2021-08-01 RX ADMIN — INSULIN LISPRO 2 UNITS: 100 INJECTION, SOLUTION INTRAVENOUS; SUBCUTANEOUS at 11:40

## 2021-08-01 RX ADMIN — LACTULOSE 20 G: 20 SOLUTION ORAL at 19:58

## 2021-08-01 RX ADMIN — ZONISAMIDE 500 MG: 100 CAPSULE ORAL at 19:54

## 2021-08-01 RX ADMIN — SODIUM CHLORIDE, PRESERVATIVE FREE 10 ML: 5 INJECTION INTRAVENOUS at 19:58

## 2021-08-01 RX ADMIN — LACTULOSE 20 G: 20 SOLUTION ORAL at 14:43

## 2021-08-01 RX ADMIN — LACOSAMIDE 200 MG: 100 TABLET, FILM COATED ORAL at 08:54

## 2021-08-01 RX ADMIN — LACTULOSE 20 G: 20 SOLUTION ORAL at 08:54

## 2021-08-01 RX ADMIN — INSULIN GLARGINE 47 UNITS: 100 INJECTION, SOLUTION SUBCUTANEOUS at 09:37

## 2021-08-01 RX ADMIN — INSULIN GLARGINE 47 UNITS: 100 INJECTION, SOLUTION SUBCUTANEOUS at 19:42

## 2021-08-01 RX ADMIN — ROPINIROLE HYDROCHLORIDE 1 MG: 1 TABLET, FILM COATED ORAL at 19:54

## 2021-08-01 ASSESSMENT — ENCOUNTER SYMPTOMS
GASTROINTESTINAL NEGATIVE: 1
RESPIRATORY NEGATIVE: 1
ALLERGIC/IMMUNOLOGIC NEGATIVE: 1

## 2021-08-01 ASSESSMENT — PAIN SCALES - GENERAL
PAINLEVEL_OUTOF10: 0

## 2021-08-01 NOTE — PLAN OF CARE
Problem: Nutritional:  Goal: Nutritional status will improve  Description: Nutritional status will improve  Outcome: Met This Shift     Problem: Physical Regulation:  Goal: Will remain free from infection  Description: Will remain free from infection  Outcome: Met This Shift     Problem: Falls - Risk of:  Goal: Will remain free from falls  Description: Will remain free from falls  Outcome: Met This Shift     Problem: Physical Regulation:  Goal: Ability to maintain a stable neurologic state will improve  Description: Ability to maintain a stable neurologic state will improve  Outcome: Ongoing     Problem: Skin Integrity:  Goal: Demonstration of wound healing without infection will improve  Description: Demonstration of wound healing without infection will improve  Outcome: Ongoing

## 2021-08-01 NOTE — PROGRESS NOTES
[facility]  Progress Note    Marilee Tavares  MRN: [id number]  Date: 8/1/2021  LOS: 5     Reason for F/U :   Altered mental status, MRSA infection    Impression :   1. History of epilepsy with breakthrough seizure  2. Infected right lower extremity ulcerations and left heel wound secondary to MRSA   · status post I&D  3. Diabetes mellitus type 2  4. Essential hypertension  5. Altered mental status/encephalopathy-multifactorial/hyperammonemia    Recommendations:   · Patient continues on intravenous antimicrobial therapy with vancomycin for the skin and soft tissue infection secondary to MRSA. · The left heel was evaluated and there are no signs of active infection at this time and continues wound care with packing. · The right anterior leg wounds are progressing well with no signs of acute infection. · Neurological work-up continues and MRI of the brain is still pending  · The care was discussed with the patient and the wife at bedside    Infection Control Recommendations:   Contact precautions    Discharge Planning:   Estimated Length of IV antimicrobials: While hospitalized  Patient will need Midline Catheter Insertion/ PICC line Insertion: No  Patient will need: Home IV , [place],  SNF,  LTAC: Undetermined  Patient willneed outpatient wound care: Yes    Medical Decision making / Summary of Stay:   Marilee Tavares is a 46y.o.-year-old male who was initially admitted on 7/27/2021. Patient seen at the request of Dr. Frankey Kay     INITIAL HISTORY:  Ramy Madrid has a history of complex seizure disorder and diabetes mellitus, presented to [facility] on 7/20/2021 with 2 ulcerations on his right leg and one on his left heel. He states they had first appeared approximately 3 weeks prior, but have gotten worse with foul-smelling discharge and severe pain.   Wound cultures of the right leg yielded MRSA.     While at [facility], the patient underwent an I&D of the left heel wound per Podiatry and was treated for the MRSA positive right leg wounds with Vanco  per ID physician, Dr. Vida Winn. One out of 2 blood cultures was positive for coag negative staph but this was considered as a contaminant.     The patient was doing well, but experienced a change in mentation and an EEG showed abnormal changes. He was going to be transferred to [place], where he follows up with his neurologist, but they had no beds available. He was then transferred to [address] [place] [address] for further neurology work-up.     Per Podiatry at [facility] 84 on 21  Dermatologic: Open lesion x2 noted to anterior aspect of right leg. The proximal lesion measures 2cm in diameter and displays a necrotic base with orange exudate present. The more distal lesion measures 3.5 cm in diameter with a necrotic base, serosanquinous and purulent drainage noted. To both lesions, negative probe to bone, negative malodor. Puncture wound with underlying eschar noted beneath superficial skin to left heel, plantar aspect. Post-debridement: left heel expresses 5cc of purulent drainage, probes to calcaneus. Extensive malodor present.        RLE   21       LLE post-debridement        21        EEG completed 21, showed abnormal bihemispheric dysfunction, no active seizures.       21 Ammonia 142 --> 53  On lactulose and decreased dosages of Depakote    Current evaluation:2021    BP (!) 138/90   Pulse 64   Temp 98.2 °F (36.8 °C) (Oral)   Resp 13   Ht 6' 2\" (1.88 m)   Wt 244 lb 3.2 oz (110.8 kg)   SpO2 95%   BMI 31.35 kg/m²     Temperature Range: Temp: 98.2 °F (36.8 °C) Temp  Av.1 °F (36.7 °C)  Min: 97.6 °F (36.4 °C)  Max: 98.6 °F (37 °C)  The patient is seen and evaluated at bedside and he is awake and alert in no acute distress. No new issues overnight. No subjective fevers or chills. He does have pain in the left heel. Review of Systems   Constitutional: Negative. Respiratory: Negative.     Cardiovascular: Negative. Gastrointestinal: Negative. Genitourinary: Negative. Musculoskeletal: Negative. Pain in left heel   Skin: Positive for wound. Allergic/Immunologic: Negative. Neurological: Negative. Physical Examination :     Physical Exam  Constitutional:       Appearance: He is well-developed. HENT:      Head: Normocephalic and atraumatic. Cardiovascular:      Rate and Rhythm: Normal rate. Heart sounds: Normal heart sounds. No friction rub. No gallop. Pulmonary:      Effort: Pulmonary effort is normal.      Breath sounds: Normal breath sounds. No wheezing. Abdominal:      General: Bowel sounds are normal.      Palpations: Abdomen is soft. There is no mass. Tenderness: There is no abdominal tenderness. Musculoskeletal:         General: Normal range of motion. Cervical back: Normal range of motion and neck supple. Lymphadenopathy:      Cervical: No cervical adenopathy. Skin:     General: Skin is warm and dry. Comments: The right anterior leg wounds were evaluated the superior one is very small and superficial.  The inferior anterior leg wound is dry and scabbing  The left heel surgical incision is healing well there is a small open wound which is packed   Neurological:      Mental Status: He is alert and oriented to person, place, and time.          Laboratory data:   I have independently reviewed the followinglabs:  CBC with Differential:   Recent Labs     07/31/21  0758 08/01/21  0537   WBC 7.4 7.6   HGB 14.0 14.1   HCT 40.8 40.3*    263   LYMPHOPCT 25 26   MONOPCT 9 8     BMP:   Recent Labs     07/31/21  0758 08/01/21  0537    142   K 3.6* 3.4*   * 109*   CO2 22 20   BUN 6 6   CREATININE 0.64* 0.56*     Hepatic Function Panel:   Recent Labs     07/29/21  1158   PROT 7.1   LABALBU 3.6   BILIDIR 0.15   IBILI 0.46   BILITOT 0.61   ALKPHOS 156*   ALT 46*   AST 22         Lab Results   Component Value Date    PROCAL 0.11 08/12/2019     Lab Results   Component Value Date    CRP <3.0 07/31/2021    CRP 3.46 07/20/2021    CRP 0.54 11/15/2020     Lab Results   Component Value Date    SEDRATE 4 07/31/2021         Lab Results   Component Value Date    DDIMER < 215.00 11/15/2020    DDIMER < 215.00 10/16/2018    DDIMER 285.00 10/16/2016    DDIMER 110.21 01/04/2012     Lab Results   Component Value Date    FERRITIN 276 11/15/2020     Lab Results   Component Value Date     11/15/2020     01/25/2016     No results found for: FIBRINOGEN    No results found for requested labs within last 30 days. Lab Results   Component Value Date    COVID19 NOT DETECTED 04/21/2021    COVID19 Detected 11/11/2020       No results for input(s): VANCOTROUGH in the last 72 hours. Imaging Studies:   MRI ANKLE LEFT W WO CONTRAST  Impression       1. Increased signal intensity in the plantar soft tissues over the calcaneus which may represent a clinically described ulcer. No definite evidence of osteomyelitis in the underlying calcaneus. 2. Slightly increased fluid in the distal tibialis posterior tendon and along the peroneus brevis and longus tendon sheaths. 3. Increased signal intensity in the interosseous ligament between the talus and calcaneus which may be torn. 4. Abnormal signal intensity in the muscles along the medial aspect of the hindfoot. This may represent a tear of the abductor hallucis muscle belly. 5. Increased signal intensity in the flexor digitorum brevis muscle belly which may represent a partial tear. 6. Thickening at the attachment of the plantar fascia upon the calcaneus. 7. Small osteochondral defect in the medial talar dome. 8. Areas of abnormal signal intensity in the anterior process of the calcaneus, base of cuboid bone and tip of lateral malleolus suggestive of degenerative changes.    9. Increased signal intensity in the subcutaneous soft tissues over the medial and lateral aspects of the hindfoot consistent with edema and/or cellulitis. 10. Fluid collection along the talonavicular joint.           Cultures:   None    Medications:      gabapentin  400 mg Oral 4x Daily    divalproex  500 mg Oral BID    lactulose  20 g Oral TID    insulin glargine  47 Units Subcutaneous BID    vancomycin (VANCOCIN) intermittent dosing (placeholder)   Other RX Placeholder    zonisamide  500 mg Oral Nightly    DULoxetine  60 mg Oral Daily    pantoprazole  40 mg Oral BID    levETIRAcetam  2,000 mg Oral BID    metoprolol tartrate  75 mg Oral BID    rOPINIRole  1 mg Oral TID    insulin lispro  0-12 Units Subcutaneous TID WC    insulin lispro  0-6 Units Subcutaneous Nightly    lacosamide  200 mg Oral BID    lactobacillus  1 capsule Oral Daily    sodium chloride flush  5-40 mL Intravenous 2 times per day    vancomycin  1,250 mg Intravenous Q8H    enoxaparin  40 mg Subcutaneous Daily           [facility]  Ladi Schmitz MD  [employer] messaging  OFFICE: [phone]      Electronically signed by Ladi Schmitz MD on 8/1/2021 at 8:40 AM  Thank you for allowing us to participate in the care of this patient. Please call with questions. This note iscreated with the assistance of a speech recognition program.  While intending to generate a document that actually reflects the content of the visit, the document can still have some errors including those of syntax andsound a like substitutions which may escape proof reading. In such instances, actual meaning can be extrapolated by contextual diversion.

## 2021-08-01 NOTE — PLAN OF CARE
Problem: Physical Regulation:  Goal: Will remain free from infection  Description: Will remain free from infection  Outcome: Ongoing     Problem: Skin Integrity:  Goal: Demonstration of wound healing without infection will improve  Description: Demonstration of wound healing without infection will improve  8/1/2021 0245 by Caroline Mccoy RN  Outcome: Ongoing     Problem: Coping:  Goal: Ability to cope will improve  Description: Ability to cope will improve  Outcome: Ongoing

## 2021-08-01 NOTE — PROGRESS NOTES
[facility]  Office: [address], DO, Mitchel Bksar, DO, Gaurang Narrow, DO, Purvi Apple Blood, DO, Romi Whiteside MD, Josue Vallejo MD, Adolfo Lance MD, Daina Ocasio MD, Earlene Tavares MD, Kale Houser MD, Darshana Marks MD, Wen Kendall, DO, Oral Weems MD, Mich Gomez, DO, Tico Clarke MD,  Fracisco Cisse, DO, Grace Engle MD, Elizabeth Downey MD, Akash Rowan MD, Souleymane Pack MD, Maral Duong MD, Clemente Lambert MD, Denise Glass MD, Radha Nolasco, [facility], [facility], CNP, Judy Lazcano, CNP, Laura Councilman, CNS, Shi Ibrahim, CNP, Sarah Beth Burroughs, CNP, April Mares, CNP, Sonya Clemente, CNP, Reddy Hooper, CNP, Andrew Claros PA-C, Nitesh Gonzalez, [facility], Jonathan Villanueva, CNP, Humaira Child, CNP, Marycruzda Starch, CNP, Destiny Paulette, CNP, Leidy Martínez, CNP, Lucinda Berman, CNP, Vaibhav Beck, CNP, Bob Fink, [address] [place] [address]    Progress Note    8/1/2021    6:21 PM    Name:   Susana Ashley  MRN:     [id number]     Acct:      [de-identified]   Room:   [address] [place] [address] Day:  5  Admit Date:  7/27/2021  3:42 PM    PCP:   Patricia Ma MD  Code Status:  Full Code    Subjective:     C/C: Seizure    Interval History Status: slight improvement. Patient seen and examined at bedside. Lying in bed. Answered questions appropriately. EEG off  No acute events overnight  MRI pending    Brief History:   Per my colleagues note: \"This is a 49-year-old male who was transferred here from [facility] for LTM E. He initially presented to the hospital for treatment of right lower extremity cellulitis. He was evaluated by podiatry and started on vancomycin and Zosyn and was transition to Vancomycin after cultures positive for MRSA. It was recommended he be discharged from OSH with bactrim. Otherwise, currently being worked up for altered mental status. Found to have elevated ammonia due to depakote. Also there was concern for repeat seizure activity so pt was placed back on LTME 7/30\"    Review of Systems:       Constitutional:  negative for chills, fevers, sweats  Respiratory:  negative for cough, dyspnea on exertion, shortness of breath, wheezing  Cardiovascular:  negative for chest pain, chest pressure/discomfort, lower extremity edema, palpitations  Gastrointestinal:  negative for abdominal pain, constipation, diarrhea, nausea, vomiting  Neurological:  negative for dizziness, headache    Medications: Allergies:     Allergies   Allergen Reactions    Ciprofloxacin-Ciproflox Hcl Er Other (See Comments)     Elevated creatinine    Heparin      Monitor for thrombocytopenia    Metformin Nausea Only     Abdominal pain, diarrhea    Niacin And Related Other (See Comments)     Burning sensation, severe flushing    Tramadol Hcl      Contraindication to seizure medications    Ultram [Tramadol]      Contraindication to seizure medications    Warfarin      Very difficult to regulate in past       Current Meds:   Scheduled Meds:    metoprolol tartrate  25 mg Oral BID    gabapentin  400 mg Oral 4x Daily    divalproex  500 mg Oral BID    lactulose  20 g Oral TID    insulin glargine  47 Units Subcutaneous BID    vancomycin (VANCOCIN) intermittent dosing (placeholder)   Other RX Placeholder    zonisamide  500 mg Oral Nightly    DULoxetine  60 mg Oral Daily    pantoprazole  40 mg Oral BID    levETIRAcetam  2,000 mg Oral BID    rOPINIRole  1 mg Oral TID    insulin lispro  0-12 Units Subcutaneous TID WC    insulin lispro  0-6 Units Subcutaneous Nightly    lacosamide  200 mg Oral BID    lactobacillus  1 capsule Oral Daily    sodium chloride flush  5-40 mL Intravenous 2 times per day    vancomycin  1,250 mg Intravenous Q8H    enoxaparin  40 mg Subcutaneous Daily     Continuous Infusions:    dextrose      sodium chloride       PRN Meds: LORazepam, glucose, dextrose, glucagon (rDNA), dextrose, sodium chloride flush, sodium chloride, potassium chloride **OR** potassium alternative oral replacement **OR** potassium chloride, magnesium sulfate, ondansetron **OR** ondansetron, polyethylene glycol, acetaminophen **OR** acetaminophen    Data:     Past Medical History:   has a past medical history of Abnormal thyroid biopsy, Acid reflux, Anemia, Anesthesia, Bipolar disorder ([address] Blood clot in vein, Cancer ([facility] [address] Chest pain, Chronic back pain, Chronic bronchitis ([facility] [address] Colon polyp, Depression, DVT (deep venous thrombosis) ([facility] [address] Esophageal abnormality, Fatty liver disease, nonalcoholic, Furuncle, History of Doppler ultrasound, History of pulmonary embolism, Hx of blood clots, Hyperlipidemia, Hypertension, Intracranial arachnoid cyst, Irritable bowel syndrome, Liver disease, MDRO (multiple drug resistant organisms) resistance, MRSA (methicillin resistant staph aureus) culture positive, Nephrolithiasis, Neuropathy, Pancreatic insufficiency, Positive SANDY (antinuclear antibody), Prolonged emergence from general anesthesia, Restless legs syndrome, Seizures ([facility] [address] Sinus tachycardia, Skull fracture ([facility] [address] Sleep apnea, Systolic dysfunction, and Type II or unspecified type diabetes mellitus without mention of complication, not stated as uncontrolled. Social History:   reports that he has never smoked. He has never used smokeless tobacco. He reports that he does not drink alcohol and does not use drugs.      Family History:   Family History   Problem Relation Age of Onset    Heart Disease Father 61        MI    Stroke Brother     Obesity Brother     Arthritis Mother     Seizures Mother     Obesity Sister     Other Brother         pulmonary embolism    Diabetes Daughter     Seizures Brother        Vitals:  /84   Pulse 61   Temp 98.5 °F (36.9 °C) (Oral)   Resp 16   Ht 6' 2\" (1.88 m)   Wt 244 lb 3.2 oz (110.8 kg)   SpO2 98%   BMI 31.35 kg/m²   Temp (24hrs), Av.1 °F (36.7 °C), Min:97.7 °F (36.5 °C), Max:98.5 °F (36.9 °C)    Recent Labs     07/31/21  1936 08/01/21  0735 08/01/21  1130 08/01/21  1553   POCGLU 196* 198* 186* 199*       I/O (24Hr): Intake/Output Summary (Last 24 hours) at 8/1/2021 1821  Last data filed at 8/1/2021 1813  Gross per 24 hour   Intake 1642.6 ml   Output 1700 ml   Net -57.4 ml       Labs:  Hematology:  Recent Labs     07/31/21  0626 07/31/21  0758 08/01/21  0537   WBC 7.4 7.4 7.6   RBC 4.14* 4.65 4.71   HGB 12.4* 14.0 14.1   HCT 36.7* 40.8 40.3*   MCV 88.6 87.7 85.6   MCH 30.0 30.1 29.9   MCHC 33.8 34.3 35.0*   RDW 13.2 12.2 11.9    228 263   MPV 10.0 9.8 10.4   SEDRATE  --  4  --    CRP  --  <3.0  --      Chemistry:  Recent Labs     07/30/21  0851 07/31/21  0758 08/01/21  0537    139 142   K 3.9 3.6* 3.4*    109* 109*   CO2 22 22 20   GLUCOSE 182* 182* 142*   BUN 6 6 6   CREATININE 0.70 0.64* 0.56*   ANIONGAP 10 8* 13   LABGLOM >60 >60 >60   GFRAA >60 >60 >60   CALCIUM 9.1 8.9 8.7     Recent Labs     07/30/21  0544 07/30/21  0851 07/30/21  0859 07/31/21  1152 07/31/21  1658 07/31/21  1936 08/01/21  0735 08/01/21  1130 08/01/21  1553   AMMONIA 142* 53  --   --   --   --   --   --   --    POCGLU 183*  --    < > 168* 225* 196* 198* 186* 199*    < > = values in this interval not displayed. ABG:  Lab Results   Component Value Date    PH 7.40 06/26/2020    PH 5.5 01/04/2012    PCO2 40 06/26/2020    PO2 71 06/26/2020    HCO3 25 06/26/2020    O2SAT 94 06/26/2020     No results found for: SPECIAL  No results found for: CULTURE    Radiology:  No results found. Physical Examination:        General appearance:  Alert, cooperative and no distress.  Inconsistently responding to commands  Mental Status:  oriented to person and place and time and normal affect  Lungs:  clear to auscultation bilaterally, normal effort  Heart:  regular rate and rhythm, no murmur  Abdomen:  soft, nontender, nondistended, normal bowel sounds, no masses, hepatomegaly, splenomegaly  Extremities: no edema, redness, tenderness in the calves. Right anterior shin ulcerations, Left foot wound  Skin:  no gross lesions, rashes, induration    Assessment:        Hospital Problems         Last Modified POA    * (Principal) Seizures ([address] 7/27/2021 Yes    Hypertension 7/27/2021 Yes    Type 2 diabetes mellitus with diabetic neuropathy ([address] 7/27/2021 Yes    Neuropathy 7/27/2021 Yes    Bipolar affective disorder ([address] 7/27/2021 Yes    Partial symptomatic epilepsy with complex partial seizures, not intractable, without status epilepticus ([address] 7/27/2021 Yes    Gastroesophageal reflux disease 7/27/2021 Yes    Diabetic leg ulcer ([address] 7/27/2021 Yes    Partial symptomatic epilepsy with complex partial seizures, intractable, without status epilepticus ([address] 7/28/2021 Yes    Episodic confusion 7/28/2021 Yes    Acute metabolic encephalopathy 1/7/3416 Yes          Plan:        1. Encephalopathy with unclear etiology. Can be secondary to toxic metabolic encephalopathy due to cellulitis. Patient has a history of MRSA with right leg ulcerations as well as left heel wound. Podiatry consult pending. ID on board. Neurology work-up in process. MRI pending. 2. On IV antibiotics. Adjust per ID recommendations when able. Dressing changes. Further recommendation per podiatry  3. A1c 8.3. On basal insulin along with sliding scale coverage. Monitor for hypoglycemia. Resume home gabapentin dosage if ok with neurology  4. Seizure history with neurology on board. Continue AEDs as recommended. 5. Continue current antihypertensives  6. DVT/GI prophylaxis  7.  PM&R consult in KYLEE Holloway MD  8/1/2021  6:21 PM

## 2021-08-01 NOTE — PROGRESS NOTES
Neurology Nurse Practitioner Progress Note      INTERVAL HISTORY: This is a 46 y.o.  male admitted 7/27/2021 for LTME. This is a follow-up neurology progress note. The patient was examined and the chart was reviewed. Discussed with the pt & RN. Reportedly patient was confused overnight; he was found at the edge of the bed, pulling on his IV. Patient was able to recall that event and stated that he was just trying to go to the restroom. This afternoon, he was alert and oriented to self, city, state, month and the year; was unsure where he was and then kept saying [address] [place]; very delayed responses, was able to follow commands; denied any new symptoms. No seizures reported. HPI: Paige Oviedo is a 46 y.o. male with H/O R frontal subarachnoid cyst s/p R craniectomy (11/2012), intractable seizures post-craniectomy, L hemithyroidectomy d/t follicular adenoma, HTN, HLD, DM with neuropathy, DVT/PE s/p vena cava filter placement (2007), bipolar disorder, chronic back pain, non-alcoholic fatty liver, MDRO, who was admitted as a transfer from [facility] on 7/27/2021 for LTME. As per medical records, patient was initially admitted at [facility] on 07/20/2021 with right lower extremity cellulitis and left heel wound; he was found to have MRSA and was started on antibiotics. During his stay, he developed encephalopathy. Patient was evaluated by the neurology team there. EEG showed right sided slowing with intermittent sharp waves that may be epileptogenic in nature, there was also excessive slowing in the theta and delta range. Family reported that patient had slept poorly over the last few days and had been increasingly disoriented. On 7/25, he was given a dose of Ativan and loading dose of Depakote 500 mg IVPB x1. Next morning patient was more alert, however that afternoon wife reported that he was confused like he usually is during his seizures.   Wife had requested transfer to [facility] for LTME however that got delayed. Eventually patient was transferred to [facility] on 07/27/2021 for LTME to rule out subclinical seizures; neurology was consulted. Patient developed seizures after he underwent right craniectomy for the resection of right frontal subarachnoid cyst in 11/2012. He follows up with neurologist in [place] and has been on 4 different AEDs due to intractable epilepsy.        gabapentin  400 mg Oral 4x Daily    divalproex  500 mg Oral BID    lactulose  20 g Oral TID    insulin glargine  47 Units Subcutaneous BID    vancomycin (VANCOCIN) intermittent dosing (placeholder)   Other RX Placeholder    zonisamide  500 mg Oral Nightly    DULoxetine  60 mg Oral Daily    pantoprazole  40 mg Oral BID    levETIRAcetam  2,000 mg Oral BID    metoprolol tartrate  75 mg Oral BID    rOPINIRole  1 mg Oral TID    insulin lispro  0-12 Units Subcutaneous TID WC    insulin lispro  0-6 Units Subcutaneous Nightly    lacosamide  200 mg Oral BID    lactobacillus  1 capsule Oral Daily    sodium chloride flush  5-40 mL Intravenous 2 times per day    vancomycin  1,250 mg Intravenous Q8H    enoxaparin  40 mg Subcutaneous Daily       Past Medical History:   Diagnosis Date    Abnormal thyroid biopsy     s/p left hemithyroidectomy 7/1609 - follicular adenoma    Acid reflux     Anemia     resolved - previously seen by Dr. Myra Zepeda (due to enlarged spleen)    Anesthesia     seizures with brain surgery due to blood sugar got really high    B[facility] disorder [facility]     Blood clot in vein 04/07/2016    Susan Loge     Cancer [facility]) 04/2019    thyroid    Chest pain     previously seeing [facility] cardiologist, now seeing Dr. Marcy Bruce (LAD bridging on cath 4/2016)    Chronic back pain     Chronic bronchitis ([facility]     Dr. Alisha Garcia Colon polyp 08/30/2016    Depression     seeing Mari Miguel DVT (deep venous thrombosis) ([facility] [phone]    not on Coumadin due to unable to regulate - Dr. Neftali Doherty - no etiology found per patient    Esophageal abnormality     nodule     Fatty liver disease, nonalcoholic     U/S 65/2100 LMH    Furuncle     legs    History of Doppler ultrasound 05/29/2011    No hemodynamically significant carotid stenosis is identified. A thyroid nodule on each side. Dedicated ultrasound of thyroid gland is suggested to further evaluate if clinically indicated.  History of pulmonary embolism 2007    s/p GFF, related to knee surgery    Hx of blood clots     leg and lung    Hyperlipidemia     severely elevated triglycerides    Hypertension     diastolic    Intracranial arachnoid cyst 11/2012    Dr. Yecenia Ross drained, complicated with seizures, DKA    Irritable bowel syndrome     Liver disease     Gina Guzmanlpine - elevated LFT - positive smooth muscle antibody, steatosis per liver bx 3/2014 with Dr. Bella Schmid (multiple drug resistant organisms) resistance 2012    MRSA (methicillin resistant staph aureus) culture positive     h/o in foot and before brain surgery    Nephrolithiasis     noted on CT abdomen 9/2016, 6/2019    Neuropathy     Pancreatic insufficiency     Positive SANDY (antinuclear antibody)     Dr. Daljit Villarreal - first visit in 6/2013    Prolonged emergence from general anesthesia     Restless legs syndrome     Seizures ([address]     started with brain surgery    Sinus tachycardia     Holter 3/2013 - seeing Dr. Tracy Jensen fracture [facility])     clips     Sleep apnea     positive sleep apnea per study 10/2020.     Systolic dysfunction     \"systolic bridge\"  EF 62% ECHO 9/2019    Type II or unspecified type diabetes mellitus without mention of complication, not stated as uncontrolled 2007       Past Surgical History:   Procedure Laterality Date    CARDIAC CATHETERIZATION      2011,5-6 years ago   [address]  3-2012   [address]  04/28/2016    [address] RELEASE  01/2017    CHOLECYSTECTOMY  2004    COLONOSCOPY  2012    COLONOSCOPY  08/2016 2 tubular adenomas - reportedly needs repeat scope in 6 months    CRANIOTOMY  11/2012    arachnoid cyst drainage    EKG 12-LEAD  10/18/2015         ENDOSCOPY, COLON, DIAGNOSTIC      HERNIA REPAIR Right 1996    [facility]--Dr. Enedelia Romano INGUINAL HERNIA REPAIR Right 09/15/2016    Robotic assisted    KNEE ARTHROSCOPY Right 2016    KNEE SURGERY Left 2007    acl and debrided twice    OTHER SURGICAL HISTORY  5-31-11    Tilt table was associated w/ nonspecific symptoms or nausea, otherwise no significant dizziness or syncope. No significant hemodynamic changes. Otherwise, unremarkable tilt table test after 30 minutes of tilting.  OTHER SURGICAL HISTORY  01/20/2017    RIGHT SHOULDER ARTHROSCOPY, OPEN STAN, OPEN ACROMIOPLASTY, BICEP TENDESIS, RIGHT CARPAL TUNNEL RELEASE    SHOULDER SURGERY Right 01/2007    THYROID LOBECTOMY N/A 1/15/2021    THYROID LOBECTOMY, UVULOPALATOPHARYNGOPLAST LAOP performed by Cammie Sandoval MD at [address] ECHOCARDIOGRAM  3-05-11    LV size and systolic function normal. EF 55-65%.  UPPER GASTROINTESTINAL ENDOSCOPY  2012    UPPER GASTROINTESTINAL ENDOSCOPY  2016    Dr. Ke Viera Left 10/22/2019    EGD DILATION SAVORY performed by Clau Ordaz MD at [address] ENDOSCOPY Left 10/22/2019    EGD BIOPSY performed by Clau Ordaz MD at [address]  2007       PHYSICAL EXAM:      Blood pressure (!) 138/90, pulse 67, temperature 98.2 °F (36.8 °C), temperature source Oral, resp. rate 15, height 6' 2\" (1.88 m), weight 244 lb 3.2 oz (110.8 kg), SpO2 96 %.       Neurological Examination:  Mental status   Overnight patient was confused, was found at the edge of the bed, pulling his IV; he was able to recall the event and stated that he was just trying to go to the restroom; otherwise he stays alert, was oriented to self, city, state, month and the year; was not sure where he was and later kept saying [facility]; impaired short-term memory, very delayed responses, following all commands   Cranial nerves   II - visual fields intact to confrontation; pupils reactive  III, IV, VI - extraocular muscles intact; no KARIS; no nystagmus; no ptosis   V - normal facial sensation                                                               VII - normal facial symmetry                                                             VIII - intact hearing                                                                             IX, X - symmetrical palate elevation                                               XI - symmetrical shoulder shrug                                                       XII - midline tongue without atrophy or fasciculation   Motor function  Strength: Able to raise all limbs antigravity, UEs>LEs  Normal bulk and tone                  Sensory function Diminished sensation in stocking distribution     Cerebellar No visible tremors   Reflex function Ankle reflexes - absent; patellar reflexes 1/4  UEs 2/4 symmetric    Gait                  Not tested       DATA      Lab Results   Component Value Date    WBC 7.6 08/01/2021    HGB 14.1 08/01/2021    HCT 40.3 (L) 08/01/2021     08/01/2021    ALT 46 (H) 07/29/2021    AST 22 07/29/2021     08/01/2021    K 3.4 (L) 08/01/2021     (H) 08/01/2021    AMMONIA 53 07/30/2021    CREATININE 0.56 (L) 08/01/2021    BUN 6 08/01/2021    CO2 20 08/01/2021    TSH 1.72 07/28/2021    PSA 0.27 06/28/2021    INR 1.08 01/23/2021    [id number] 768 04/21/2021    FOLATE 12.2 04/21/2021    GLUF 283 (H) 10/17/2015    LABA1C 8.3 (H) 07/20/2021    LABMICR < 1.20 12/29/2020     Lab Results   Component Value Date    CHOL 147 12/29/2020    CHOL 133 12/26/2019    CHOL 166 10/06/2017     Lab Results   Component Value Date    TRIG 246 (H) 04/06/2021    TRIG 860 (H) 12/29/2020    TRIG 334 (H) 12/26/2019     Lab Results   Component Value Date HDL 26 12/29/2020    HDL 29 12/26/2019    HDL 32 10/06/2017     Lab Results   Component Value Date    LDLCALC SEE BELOW 12/29/2020    LDLCALC 37 12/26/2019    LDLCALC 64 10/06/2017      7/25/2021 09:43 7/26/2021 05:33 7/27/2021 18:11 7/29/2021 11:58   Lacosamide  9.9 8.9    Levetiracetam  38 34    Valproic Acid Lvl 42.8 (L)  44 (L) 66   Valproic Acid, Free   3.9 (L) 6.9 (L)   Valproic acid % free   8.9 10.5   Zonisamide   13       7/27/2021 18:11 7/30/2021 05:44 7/30/2021 08:51   Ammonia 109 (H) 142 (HH) 53      7/31/2021 07:58   CRP <3.0   ESR 4         DIAGNOSTIC DATA:  MRI BRAIN: HELD    LTME (7/28 -   Severely abnormal record with evidence of bihemispheric dysfunction no active seizures recorded but clear-cut ictal propensity recording consistent with resolved status head dominant focus appears to be residual left frontal area                        IMPRESSION: 46 y.o.  male admitted with  Toxic metabolic encephalopathy in the setting of hyperammonemia (109) & RLE MRSA cellulitis; patient is on vancomycin & lactulose. He received levocarnitine 6 g IVPB x 1, followed by 3 g IVPB x 3 doses    Pt was alert and oriented to self, hospital, state and the year; delayed responses, low verbal output, was able to follow simple commands. Wife reported much improvement in mentation     Patient was transferred to [facility] for LTME to r/o subclinical seizures; H/O intractable seizure disorder post-R craniectomy (11/2012) d/t R frontal subarachnoid cyst resection; LTME - bi-hemispheric dysfunction with no epileptiform discharges.  No clinical seizures reported    H/O L hemithyroidectomy due to follicular adenoma  (4/72/3775)    Diabetic neuropathy; is on Neurontin 400 mg QID    RLS; on Requip 1 mg TID    Comorbid conditions - HTN, HLD, DVT/PE s/p vena cava filter placement (2007), bipolar disorder, chronic back pain, IBS, non-alcoholic fatty liver, MDRO, nephrolithiasis, pancreatic insufficiency, MISTI, depression            PLAN:  MRI brain - held    Continue Depakote  mg BID (increased dose caused hyperammonemia), Vimpat 200 mg BID, Keppra 2 g BID PO & Zonegran 500 mg HS    Continue PT/OT    Patient F/U with his neurologist in [place]    Will follow    Please note that this note was generated using a voice recognition dictation software. Although every effort was made to ensure the accuracy of this automated transcription, some errors in transcription may have occurred.

## 2021-08-02 ENCOUNTER — APPOINTMENT (OUTPATIENT)
Dept: MRI IMAGING | Age: 51
DRG: 056 | End: 2021-08-02
Attending: INTERNAL MEDICINE
Payer: MEDICARE

## 2021-08-02 LAB
ABSOLUTE EOS #: 0.2 K/UL (ref 0–0.44)
ABSOLUTE IMMATURE GRANULOCYTE: 0.1 K/UL (ref 0–0.3)
ABSOLUTE LYMPH #: 2.21 K/UL (ref 1.1–3.7)
ABSOLUTE MONO #: 0.58 K/UL (ref 0.1–1.2)
ANION GAP SERPL CALCULATED.3IONS-SCNC: 11 MMOL/L (ref 9–17)
BASOPHILS # BLD: 1 % (ref 0–2)
BASOPHILS ABSOLUTE: 0.06 K/UL (ref 0–0.2)
BUN BLDV-MCNC: 10 MG/DL (ref 6–20)
BUN/CREAT BLD: ABNORMAL (ref 9–20)
CALCIUM SERPL-MCNC: 8.4 MG/DL (ref 8.6–10.4)
CHLORIDE BLD-SCNC: 107 MMOL/L (ref 98–107)
CO2: 20 MMOL/L (ref 20–31)
CREAT SERPL-MCNC: 0.6 MG/DL (ref 0.7–1.2)
DIFFERENTIAL TYPE: ABNORMAL
EOSINOPHILS RELATIVE PERCENT: 3 % (ref 1–4)
GFR AFRICAN AMERICAN: >60 ML/MIN
GFR NON-AFRICAN AMERICAN: >60 ML/MIN
GFR SERPL CREATININE-BSD FRML MDRD: ABNORMAL ML/MIN/{1.73_M2}
GFR SERPL CREATININE-BSD FRML MDRD: ABNORMAL ML/MIN/{1.73_M2}
GLUCOSE BLD-MCNC: 137 MG/DL (ref 75–110)
GLUCOSE BLD-MCNC: 148 MG/DL (ref 75–110)
GLUCOSE BLD-MCNC: 165 MG/DL (ref 70–99)
GLUCOSE BLD-MCNC: 174 MG/DL (ref 75–110)
GLUCOSE BLD-MCNC: 235 MG/DL (ref 75–110)
HCT VFR BLD CALC: 40.7 % (ref 40.7–50.3)
HEMOGLOBIN: 14.1 G/DL (ref 13–17)
IMMATURE GRANULOCYTES: 1 %
LYMPHOCYTES # BLD: 28 % (ref 24–43)
MCH RBC QN AUTO: 29.7 PG (ref 25.2–33.5)
MCHC RBC AUTO-ENTMCNC: 34.6 G/DL (ref 28.4–34.8)
MCV RBC AUTO: 85.9 FL (ref 82.6–102.9)
MONOCYTES # BLD: 7 % (ref 3–12)
NRBC AUTOMATED: 0 PER 100 WBC
PDW BLD-RTO: 12 % (ref 11.8–14.4)
PLATELET # BLD: 320 K/UL (ref 138–453)
PLATELET ESTIMATE: ABNORMAL
PMV BLD AUTO: 10.7 FL (ref 8.1–13.5)
POTASSIUM SERPL-SCNC: 4 MMOL/L (ref 3.7–5.3)
RBC # BLD: 4.74 M/UL (ref 4.21–5.77)
RBC # BLD: ABNORMAL 10*6/UL
SEG NEUTROPHILS: 60 % (ref 36–65)
SEGMENTED NEUTROPHILS ABSOLUTE COUNT: 4.78 K/UL (ref 1.5–8.1)
SODIUM BLD-SCNC: 138 MMOL/L (ref 135–144)
WBC # BLD: 7.9 K/UL (ref 3.5–11.3)
WBC # BLD: ABNORMAL 10*3/UL

## 2021-08-02 PROCEDURE — 85025 COMPLETE CBC W/AUTO DIFF WBC: CPT

## 2021-08-02 PROCEDURE — 6360000002 HC RX W HCPCS: Performed by: NURSE PRACTITIONER

## 2021-08-02 PROCEDURE — 70551 MRI BRAIN STEM W/O DYE: CPT

## 2021-08-02 PROCEDURE — 2580000003 HC RX 258: Performed by: CLINICAL NURSE SPECIALIST

## 2021-08-02 PROCEDURE — 92523 SPEECH SOUND LANG COMPREHEN: CPT

## 2021-08-02 PROCEDURE — 6370000000 HC RX 637 (ALT 250 FOR IP): Performed by: INTERNAL MEDICINE

## 2021-08-02 PROCEDURE — 6360000002 HC RX W HCPCS: Performed by: INTERNAL MEDICINE

## 2021-08-02 PROCEDURE — 36415 COLL VENOUS BLD VENIPUNCTURE: CPT

## 2021-08-02 PROCEDURE — 2060000000 HC ICU INTERMEDIATE R&B

## 2021-08-02 PROCEDURE — 6370000000 HC RX 637 (ALT 250 FOR IP): Performed by: FAMILY MEDICINE

## 2021-08-02 PROCEDURE — 80048 BASIC METABOLIC PNL TOTAL CA: CPT

## 2021-08-02 PROCEDURE — 82947 ASSAY GLUCOSE BLOOD QUANT: CPT

## 2021-08-02 PROCEDURE — 6370000000 HC RX 637 (ALT 250 FOR IP): Performed by: NURSE PRACTITIONER

## 2021-08-02 PROCEDURE — 6370000000 HC RX 637 (ALT 250 FOR IP)

## 2021-08-02 PROCEDURE — 99232 SBSQ HOSP IP/OBS MODERATE 35: CPT | Performed by: INTERNAL MEDICINE

## 2021-08-02 PROCEDURE — 2580000003 HC RX 258: Performed by: INTERNAL MEDICINE

## 2021-08-02 PROCEDURE — 99232 SBSQ HOSP IP/OBS MODERATE 35: CPT | Performed by: NURSE PRACTITIONER

## 2021-08-02 PROCEDURE — 99232 SBSQ HOSP IP/OBS MODERATE 35: CPT | Performed by: FAMILY MEDICINE

## 2021-08-02 PROCEDURE — 6370000000 HC RX 637 (ALT 250 FOR IP): Performed by: CLINICAL NURSE SPECIALIST

## 2021-08-02 RX ORDER — DOXYCYCLINE HYCLATE 100 MG
100 TABLET ORAL EVERY 12 HOURS SCHEDULED
Status: DISCONTINUED | OUTPATIENT
Start: 2021-08-02 | End: 2021-08-05 | Stop reason: HOSPADM

## 2021-08-02 RX ADMIN — LACTULOSE 20 G: 20 SOLUTION ORAL at 14:14

## 2021-08-02 RX ADMIN — Medication 1250 MG: at 05:34

## 2021-08-02 RX ADMIN — METOPROLOL TARTRATE 25 MG: 25 TABLET ORAL at 07:39

## 2021-08-02 RX ADMIN — INSULIN LISPRO 1 UNITS: 100 INJECTION, SOLUTION INTRAVENOUS; SUBCUTANEOUS at 21:51

## 2021-08-02 RX ADMIN — LEVETIRACETAM 2000 MG: 500 TABLET, FILM COATED ORAL at 21:50

## 2021-08-02 RX ADMIN — GABAPENTIN 400 MG: 400 CAPSULE ORAL at 17:20

## 2021-08-02 RX ADMIN — ZONISAMIDE 500 MG: 100 CAPSULE ORAL at 21:49

## 2021-08-02 RX ADMIN — INSULIN LISPRO 2 UNITS: 100 INJECTION, SOLUTION INTRAVENOUS; SUBCUTANEOUS at 17:20

## 2021-08-02 RX ADMIN — SODIUM CHLORIDE, PRESERVATIVE FREE 5 ML: 5 INJECTION INTRAVENOUS at 07:50

## 2021-08-02 RX ADMIN — INSULIN LISPRO 4 UNITS: 100 INJECTION, SOLUTION INTRAVENOUS; SUBCUTANEOUS at 07:40

## 2021-08-02 RX ADMIN — Medication 1 CAPSULE: at 07:39

## 2021-08-02 RX ADMIN — INSULIN GLARGINE 47 UNITS: 100 INJECTION, SOLUTION SUBCUTANEOUS at 21:50

## 2021-08-02 RX ADMIN — LACOSAMIDE 200 MG: 100 TABLET, FILM COATED ORAL at 21:49

## 2021-08-02 RX ADMIN — PANTOPRAZOLE SODIUM 40 MG: 40 TABLET, DELAYED RELEASE ORAL at 21:50

## 2021-08-02 RX ADMIN — DIVALPROEX SODIUM 500 MG: 500 TABLET, DELAYED RELEASE ORAL at 21:50

## 2021-08-02 RX ADMIN — ROPINIROLE HYDROCHLORIDE 1 MG: 1 TABLET, FILM COATED ORAL at 07:37

## 2021-08-02 RX ADMIN — ROPINIROLE HYDROCHLORIDE 1 MG: 1 TABLET, FILM COATED ORAL at 14:14

## 2021-08-02 RX ADMIN — LACTULOSE 20 G: 20 SOLUTION ORAL at 07:38

## 2021-08-02 RX ADMIN — DOXYCYCLINE HYCLATE 100 MG: 100 TABLET, COATED ORAL at 21:49

## 2021-08-02 RX ADMIN — GABAPENTIN 400 MG: 400 CAPSULE ORAL at 07:40

## 2021-08-02 RX ADMIN — GABAPENTIN 400 MG: 400 CAPSULE ORAL at 21:50

## 2021-08-02 RX ADMIN — ACETAMINOPHEN 650 MG: 325 TABLET ORAL at 21:50

## 2021-08-02 RX ADMIN — METOPROLOL TARTRATE 25 MG: 25 TABLET ORAL at 22:32

## 2021-08-02 RX ADMIN — LACOSAMIDE 200 MG: 100 TABLET, FILM COATED ORAL at 09:11

## 2021-08-02 RX ADMIN — LEVETIRACETAM 2000 MG: 500 TABLET, FILM COATED ORAL at 07:37

## 2021-08-02 RX ADMIN — SODIUM CHLORIDE, PRESERVATIVE FREE 10 ML: 5 INJECTION INTRAVENOUS at 21:51

## 2021-08-02 RX ADMIN — ROPINIROLE HYDROCHLORIDE 1 MG: 1 TABLET, FILM COATED ORAL at 21:50

## 2021-08-02 RX ADMIN — ENOXAPARIN SODIUM 40 MG: 40 INJECTION SUBCUTANEOUS at 07:39

## 2021-08-02 RX ADMIN — PANTOPRAZOLE SODIUM 40 MG: 40 TABLET, DELAYED RELEASE ORAL at 07:40

## 2021-08-02 RX ADMIN — Medication 1250 MG: at 14:14

## 2021-08-02 RX ADMIN — GABAPENTIN 400 MG: 400 CAPSULE ORAL at 14:14

## 2021-08-02 RX ADMIN — DULOXETINE HYDROCHLORIDE 60 MG: 30 CAPSULE, DELAYED RELEASE ORAL at 07:38

## 2021-08-02 RX ADMIN — INSULIN GLARGINE 47 UNITS: 100 INJECTION, SOLUTION SUBCUTANEOUS at 07:37

## 2021-08-02 RX ADMIN — DIVALPROEX SODIUM 500 MG: 500 TABLET, DELAYED RELEASE ORAL at 07:38

## 2021-08-02 ASSESSMENT — PAIN SCALES - GENERAL
PAINLEVEL_OUTOF10: 0
PAINLEVEL_OUTOF10: 3
PAINLEVEL_OUTOF10: 0
PAINLEVEL_OUTOF10: 5
PAINLEVEL_OUTOF10: 0
PAINLEVEL_OUTOF10: 6
PAINLEVEL_OUTOF10: 6

## 2021-08-02 ASSESSMENT — PAIN DESCRIPTION - LOCATION: LOCATION: FOOT

## 2021-08-02 ASSESSMENT — PAIN DESCRIPTION - PAIN TYPE: TYPE: ACUTE PAIN

## 2021-08-02 ASSESSMENT — PAIN DESCRIPTION - ORIENTATION: ORIENTATION: LEFT

## 2021-08-02 NOTE — PROGRESS NOTES
NEUROLOGY INPATIENT PROGRESS NOTE    8/2/2021         Current Exam:     Chart reviewed. Discussed with RN. Nurse reports patient has been partially oriented this morning. He is able to follow commands. Continues with very slow responses. He was able to eat breakfast. MRI brain is pending. Wife reports overall improvement in patient's cognition since arrival, but still not back to baseline. Brief History:    Zeeshan Luevano is a  46 y.o. male with H/O right frontal subarachnoid cyst status post right craniectomy (November 2012), intractable seizures post craniectomy, left hemithyroidectomy due to follicular adenoma, HTN, HLD, DM with neuropathy, DVT/PE status post vena cava filter, bipolar disorder, nonalcoholic fatty liver, who was admitted as a transfer from [facility] on 7/27/2021 for LTME. Per the notes the patient was admitted to [place] [address] on 7/20/2021 with right lower extremity cellulitis and a left heel wound; was found to have MRSA and was started on antibiotics. During his stay he developed encephalopathy and was evaluated by the neurology team; EEG with right-sided slowing with intermittent sharp waves that may be epileptogenic in nature as well as excessive slowing in the theta and delta range. Wife had requested transfer to [facility] for LTME however this was delayed and patient was ultimately transferred to [facility] on 7/27 for LTME to rule out subclinical seizures. The patient follows with neurology in [place] and has been on 4 different AEDs due to intractable epilepsy. LTME done with no events recorded, severely abnormal record with evidence of bihemispheric dysfunction with no active seizures recorded but clear-cut ictal propensity recording consistent with resolved status. Patient was also found to have hyperammonemia with ammonia level reaching into the 140s. He was placed on lactulose and ammonia level normalized.     Of note, the patient was recently admitted to the EMU in April 2021 at which time several although not all of his typical seizure-like episodes were captured, none correlating to epileptiform discharges on EEG. No current facility-administered medications on file prior to encounter. Current Outpatient Medications on File Prior to Encounter   Medication Sig Dispense Refill    insulin lispro (HUMALOG) 100 UNIT/ML injection vial Takes 10 units with meals plus group 2 sliding scale      promethazine (PHENERGAN) 12.5 MG tablet Take 1-2 tablets by mouth every 8 hours as needed for Nausea 60 tablet 1    blood glucose test strips (ONETOUCH ULTRA) strip 1 each by In Vitro route 3 times daily DX: E11.65 Dispense Onetouch Ultra 2 test strips 300 each 3    pantoprazole (PROTONIX) 40 MG tablet Take 1 tablet by mouth 2 times daily 180 tablet 1    vitamin D (ERGOCALCIFEROL) 1.25 MG (51865 UT) CAPS capsule Take 1 capsule by mouth once a week 12 capsule 1    insulin lispro, 1 Unit Dial, (HUMALOG KWIKPEN) 100 UNIT/ML SOPN Inject 10 Units into the skin 3 times daily Plus sliding scale 2 9 mL 0    insulin glargine (LANTUS SOLOSTAR) 100 UNIT/ML injection pen Inject 75 Units into the skin 2 times daily      gabapentin (NEURONTIN) 600 MG tablet Take 600 mg by mouth 4 times daily.  rOPINIRole (REQUIP) 1 MG tablet TAKE 1 TABLET BY MOUTH THREE TIMES A  tablet 1    metoprolol tartrate (LOPRESSOR) 50 MG tablet Take 1.5 tablets by mouth 2 times daily (Patient taking differently: Take 50 mg by mouth 2 times daily ) 270 tablet 1    empagliflozin (JARDIANCE) 25 MG tablet Take 25 mg by mouth daily 42 tablet 0    DULoxetine (CYMBALTA) 60 MG extended release capsule Take by mouth daily 2 tab      lacosamide (VIMPAT) 100 MG TABS tablet Take 200 mg by mouth 2 times daily.        sildenafil (VIAGRA) 100 MG tablet Take 1 tablet by mouth as needed for Erectile Dysfunction 45 tablet 3    divalproex (DEPAKOTE) 500 MG DR tablet Take 500 mg by mouth 2 times daily       levETIRAcetam (KEPPRA) 500 MG tablet Take 2,000 mg by mouth 2 times daily       zonisamide (ZONEGRAN) 100 MG capsule Take by mouth nightly 5 capsules         Allergies: Sofya Headley is allergic to ciprofloxacin-ciproflox hcl er, heparin, metformin, niacin and related, tramadol hcl, ultram [tramadol], and warfarin. Past Medical History:   Diagnosis Date    Abnormal thyroid biopsy     s/p left hemithyroidectomy 7/4766 - follicular adenoma    Acid reflux     Anemia     resolved - previously seen by Dr. Lemus Due (due to enlarged spleen)    Anesthesia     seizures with brain surgery due to blood sugar got really high    Bipolar disorder ([facility]     Blood clot in vein 04/07/2016    Delpha Jake     Cancer [facility]) 04/2019    thyroid    Chest pain     previously seeing [address] [place] [address] cardiologist, now seeing Dr. Claudene Chasten (LAD bridging on cath 4/2016)    Chronic back pain     Chronic bronchitis ([facility] [address]     Dr. Binu Alvarado Colon polyp 08/30/2016    Depression     seeing Clora Sero DVT (deep venous thrombosis) ([facility] [address] [phone]    not on Coumadin due to unable to regulate - Dr. Hudson - no etiology found per patient    Esophageal abnormality     nodule     Fatty liver disease, nonalcoholic     U/S 77/5469 [facility]    [facility]     [facility]    History of Doppler ultrasound 05/29/2011    No hemodynamically significant carotid stenosis is identified. A thyroid nodule on each side. Dedicated ultrasound of thyroid gland is suggested to further evaluate if clinically indicated.      History of pulmonary embolism 2007    s/p GFF, related to knee surgery    Hx of blood clots     leg and lung    Hyperlipidemia     severely elevated triglycerides    Hypertension     diastolic    Intracranial arachnoid cyst 11/2012    Dr. Elisa England drained, complicated with seizures, DKA    Irritable bowel syndrome     Liver disease     Claudy Fontaine - elevated LFT - positive smooth muscle antibody, steatosis per liver bx 3/2014 with Dr. Marla Kumari  MDRO (multiple drug resistant organisms) resistance 2012    MRSA (methicillin resistant staph aureus) culture positive     h/o in foot and before brain surgery    Nephrolithiasis     noted on CT abdomen 9/2016, 6/2019    Neuropathy     Pancreatic insufficiency     Positive SANDY (antinuclear antibody)     Dr. Pop Console - first visit in 6/2013    Prolonged emergence from general anesthesia     Restless legs syndrome     Seizures ([address]     started with brain surgery    Sinus tachycardia     Holter 3/2013 - seeing Dr. Brandon Zhu Skull fracture [facility])     clips     Sleep apnea     positive sleep apnea per study 10/2020.  Systolic dysfunction     \"systolic bridge\"  EF 39% ECHO 9/2019    Type II or unspecified type diabetes mellitus without mention of complication, not stated as uncontrolled 2007       Past Surgical History:   Procedure Laterality Date    CARDIAC CATHETERIZATION      2011,5-6 years ago   [address]  3-2012   [address]  04/28/2016    [address]     CARPAL TUNNEL RELEASE  01/2017    CHOLECYSTECTOMY  2004    COLONOSCOPY  2012    COLONOSCOPY  08/2016    2 tubular adenomas - reportedly needs repeat scope in 6 months    CRANIOTOMY  11/2012    arachnoid cyst drainage    EKG 12-LEAD  10/18/2015         ENDOSCOPY, COLON, DIAGNOSTIC      HERNIA REPAIR Right 1996    [facility]--Dr. Cipriano Calixto INGUINAL HERNIA REPAIR Right 09/15/2016    Robotic assisted    KNEE ARTHROSCOPY Right 2016    KNEE SURGERY Left 2007    acl and debrided twice    OTHER SURGICAL HISTORY  5-31-11    Tilt table was associated w/ nonspecific symptoms or nausea, otherwise no significant dizziness or syncope. No significant hemodynamic changes. Otherwise, unremarkable tilt table test after 30 minutes of tilting.      OTHER SURGICAL HISTORY  01/20/2017    RIGHT SHOULDER ARTHROSCOPY, OPEN STAN, OPEN ACROMIOPLASTY, BICEP TENDESIS, RIGHT CARPAL TUNNEL RELEASE    SHOULDER SURGERY Right 01/2007    THYROID suicidal ideation. Patient is not anxious        NEUROLOGIC EXAMINATION  GENERAL  Appears comfortable and in no distress   HEENT  NC/ AT   NECK  Supple   MENTAL STATUS:  Alert, oriented to self, city, year but not to place, verbal responses are delayed, normal speech, normal language, no hallucination or delusion. Intact naming.    CRANIAL NERVES: II     -      Visual fields intact to confrontation  III,IV,VI -  EOMs full, no afferent defect, no KARIS, left ptosis  V     -     Normal facial sensation  VII    -     Normal facial symmetry  VIII   -     Intact hearing  IX,X -     Symmetrical palate  XI    -     Symmetrical shoulder shrug  XII   -     Midline tongue, no atrophy    MOTOR FUNCTION:  4/5 to BUEs and 5/5 to BLE with normal bulk, normal tone and no involuntary movements, no tremor   SENSORY FUNCTION:  Normal touch, normal pin   CEREBELLAR FUNCTION:  Intact fine motor control over upper limbs   REFLEX FUNCTION:  Hypoactive KJ, no perverted reflex, no Babinski sign   STATION and GAIT  Not tested       Data:    Lab Results:   CBC:   Recent Labs     07/31/21  0758 08/01/21  0537 08/02/21  0608   WBC 7.4 7.6 7.9   HGB 14.0 14.1 14.1    263 320     BMP:    Recent Labs     07/31/21  0758 08/01/21  0537 08/02/21  0608    142 138   K 3.6* 3.4* 4.0   * 109* 107   CO2 22 20 20   BUN 6 6 10   CREATININE 0.64* 0.56* 0.60*   GLUCOSE 182* 142* 165*         Lab Results   Component Value Date    CHOL 147 12/29/2020    HDL 26 12/29/2020    TRIG 246 (H) 04/06/2021    ALT 46 (H) 07/29/2021    AST 22 07/29/2021    TSH 1.72 07/28/2021    INR 1.08 01/23/2021    GLUF 283 (H) 10/17/2015    LABA1C 8.3 (H) 07/20/2021    LABMICR < 1.20 12/29/2020    [id number] 768 04/21/2021           Diagnostic data reviewed:       ammonia level:          VIDEO EEG monitoring 7/29/21: No events recorded; severely abnormal record with evidence of bihemispheric dysfunction with no active seizures recorded but clear-cut ictal propensity recording consistent with resolved status.        VIDEO EEG monitoring 7/30/21: Normal EEG with evidence of diffuse generalized disturbance in electrocortical function. There may be toxic metabolic components and it is noted that patient has hyperammonemia which can be seen in the context of valproic acid usage. Impression:  -Toxic metabolic encephalopathy in setting of hyperammonemia, MRSA cellulitis, possible recent breakthrough seizures  -History of right frontal arachnoid cyst status post resection with skull fracture  -History of intractable epilepsy    Plan:  -MRI brain is pending  -No seizures on LTME; continue lactulose as well as Depakote 500 mg twice daily, Keppra 2 g twice daily, zonisamide 500 mg nightly, Vimpat 200 mg twice daily  -Patient remains on antibiotics for cellulitis  -Patient follows with his neurologist in [facility] PT/OT/ST  -We will follow    Please note that this note was generated using a voice recognition dictation software. Although every effort was made to ensure the accuracy of this automated transcription, some errors in transcription may have occurred.

## 2021-08-02 NOTE — PROGRESS NOTES
[facility] - Progress Note  Today's Date and Time: 8/2/2021, 10:52 AM    Impression :   · MRSA right leg ulcerations  · Left heel wound  · Breakthrough seizure with history of epilepsy  · Diabetes mellitus type 2  · Essential hypertension  · Hyperammonemia  · Altered mentation    Recommendations:   · D/C Vancomycin   · Wound care  · Doxycycline 100 mg po BID x 10 days. Stop date 8-12-21  · Wound care      Medical Decision Making/Summary/Discussion:8/2/2021     ·   Infection Control Recommendations   · [place] Precautions  · Contact Isolation     Antimicrobial Stewardship Recommendations     · Simplification of therapy  · Targeted therapy    Coordination of Outpatient Care:   · Estimated Length of IV antimicrobials: TBD  · Patient will need Midline Catheter Insertion: No  · Patient will need PICC line Insertion:  · Patient will need: Home IV , [place],  SNF,  LTAC:TBD  · Patient will need outpatient wound care:Yes    Chief complaint/reason for consultation:   · Cellulitis, foot abscess growing MRSA      History of Present Illness:   Gautam Rodriguez is a 46y.o.-year-old male who was initially admitted on 7/27/2021. Patient seen at the request of Dr. Flo Ortiz:    Initially, this patient with a history of complex seizure disorder and diabetes mellitus, presented to [facility] on 7/20/2021 with 2 ulcerations on his right leg and one on his left heel. He states they had first appeared approximately 3 weeks prior, but have gotten worse with foul-smelling discharge and severe pain. Wound cultures of the right leg yielded MRSA. While at [facility], the patient underwent an I&D of the left heel wound per Podiatry and was treated for the MRSA positive right leg wounds with Vanco  per ID physician, Dr. Irene Mireles. One out of 2 blood cultures was positive for coag negative staph but this was considered as a contaminant.     The patient was doing well, but experienced a change in mentation and an EEG showed abnormal changes. He was going to be transferred to [place], where he follows up with his neurologist, but they had no beds available. He was then transferred to [address] [place] [address] for further neurology work-up. Per Podiatry at [facility] on 7/20/21   Vascular: Dorsalis pedis and posterior tibial pulses are palpable to right foot, DP is palpable to left. PT biphasic on doppler exam left. Skin temperature is warm  to cool from proximal tibial tuberosity to distal digits. CFT brisk to exposed digits. Edema noted to left heel and ankle, non-pitting. Hair growth present to bilateral feet. Quality of skin is wnl for patient age.      Dermatologic: Open lesion x2 noted to anterior aspect of right leg. The proximal lesion measures 2cm in diameter and displays a necrotic base with orange exudate present. The more distal lesion measures 3.5 cm in diameter with a necrotic base, serosanquinous and purulent drainage noted. To both lesions, negative probe to bone, negative malodor. Puncture wound with underlying eschar noted beneath superficial skin to left heel, plantar aspect. Post-debridement: left heel expresses 5cc of purulent drainage, probes to calcaneus. Extensive malodor present.      Neurovascular: Light touch sensation grossly absent to bilateral feet, restored at mid calf.      Musculoskeletal: Muscle strength 5/5 for all plantarflexors, dorsiflexors, inverters and everters to RLE, 4/5 for all quadrants LLE. Exquisite pain to palpation of left heel, and left ankle to the medial and posterior aspects. Cavus foot type noted bilateral. Semi-rigid contractures of digits 1-5 bilateral noted. No amputations noted bilateral.       RLE   7/20/21       LLE post-debridement        7/24/21         CURRENT EVALUATION 8/2/2021    Patient evaluated at bedside at time of rounds.    Afebrile  Vital signs stable    Patient is scheduled to be d/c today to ARU at [facility]. Furuncle     legs    History of Doppler ultrasound 05/29/2011    No hemodynamically significant carotid stenosis is identified. A thyroid nodule on each side. Dedicated ultrasound of thyroid gland is suggested to further evaluate if clinically indicated.  History of pulmonary embolism 2007    s/p GFF, related to knee surgery    Hx of blood clots     leg and lung    Hyperlipidemia     severely elevated triglycerides    Hypertension     diastolic    Intracranial arachnoid cyst 11/2012    Dr. Nelia Valenciaher drained, complicated with seizures, DKA    Irritable bowel syndrome     Liver disease     Lindsay Main - elevated LFT - positive smooth muscle antibody, steatosis per liver bx 3/2014 with Dr. Alma Diallo (multiple drug resistant organisms) resistance 2012    MRSA (methicillin resistant staph aureus) culture positive     h/o in foot and before brain surgery    Nephrolithiasis     noted on CT abdomen 9/2016, 6/2019    Neuropathy     Pancreatic insufficiency     Positive SANDY (antinuclear antibody)     Dr. Ramsey Cheema - first visit in 6/2013    Prolonged emergence from general anesthesia     Restless legs syndrome     Seizures ([address]     started with brain surgery    Sinus tachycardia     Holter 3/2013 - seeing Dr. Scout Bruce fracture [facility])     clips     Sleep apnea     positive sleep apnea per study 10/2020.     Systolic dysfunction     \"systolic bridge\"  EF 31% ECHO 9/2019    Type II or unspecified type diabetes mellitus without mention of complication, not stated as uncontrolled 2007       Past Surgical  History:     Past Surgical History:   Procedure Laterality Date    CARDIAC CATHETERIZATION      2011,5-6 years ago   [address]  3-2012   [address]  04/28/2016    [facility]     CARPAL TUNNEL RELEASE  01/2017    CHOLECYSTECTOMY  2004    COLONOSCOPY  2012    COLONOSCOPY  08/2016    2 tubular adenomas - reportedly needs repeat scope in 6 months    CRANIOTOMY  11/2012 arachnoid cyst drainage    EKG 12-LEAD  10/18/2015         ENDOSCOPY, COLON, DIAGNOSTIC      HERNIA REPAIR Right 1996    [facility]--Dr. Ginna Brewer    INGUINAL HERNIA REPAIR Right 09/15/2016    Robotic assisted    KNEE ARTHROSCOPY Right 2016    KNEE SURGERY Left 2007    acl and debrided twice    OTHER SURGICAL HISTORY  5-31-11    Tilt table was associated w/ nonspecific symptoms or nausea, otherwise no significant dizziness or syncope. No significant hemodynamic changes. Otherwise, unremarkable tilt table test after 30 minutes of tilting.  OTHER SURGICAL HISTORY  01/20/2017    RIGHT SHOULDER ARTHROSCOPY, OPEN STAN, OPEN ACROMIOPLASTY, BICEP TENDESIS, RIGHT CARPAL TUNNEL RELEASE    SHOULDER SURGERY Right 01/2007    THYROID LOBECTOMY N/A 1/15/2021    THYROID LOBECTOMY, UVULOPALATOPHARYNGOPLAST LAOP performed by Martinez Sellers MD at [facility] ECHOCARDIOGRAM  3-05-11    LV size and systolic function normal. EF 55-65%.      UPPER GASTROINTESTINAL ENDOSCOPY  2012    UPPER GASTROINTESTINAL ENDOSCOPY  2016    Dr. Laura Price Left 10/22/2019    EGD DILATION SAVORY performed by Apolonia Pratt MD at [address] [facility]    UPPER GASTROINTESTINAL ENDOSCOPY Left 10/22/2019    EGD BIOPSY performed by Apolonia Pratt MD at [address]  2007       Medications:      metoprolol tartrate  25 mg Oral BID    gabapentin  400 mg Oral 4x Daily    divalproex  500 mg Oral BID    lactulose  20 g Oral TID    insulin glargine  47 Units Subcutaneous BID    vancomycin (VANCOCIN) intermittent dosing (placeholder)   Other RX Placeholder    zonisamide  500 mg Oral Nightly    DULoxetine  60 mg Oral Daily    pantoprazole  40 mg Oral BID    levETIRAcetam  2,000 mg Oral BID    rOPINIRole  1 mg Oral TID    insulin lispro  0-12 Units Subcutaneous TID WC    insulin lispro  0-6 Units Subcutaneous Nightly    lacosamide  200 mg Oral BID  lactobacillus  1 capsule Oral Daily    sodium chloride flush  5-40 mL Intravenous 2 times per day    vancomycin  1,250 mg Intravenous Q8H    enoxaparin  40 mg Subcutaneous Daily       Social History:     Social History     Socioeconomic History    Marital status:      Spouse name: Not on file    Number of children: 3    Years of education: Not on file    Highest education level: Not on file   Occupational History    Occupation: Disability since 2011   Tobacco Use    Smoking status: Never Smoker    Smokeless tobacco: Never Used   Vaping Use    Vaping Use: Never used   Substance and Sexual Activity    Alcohol use: No     Alcohol/week: 0.0 standard drinks    Drug use: No    Sexual activity: Not on file   Other Topics Concern    Not on file   Social History Narrative    Not on file     Social Determinants of Health     Financial Resource Strain: Medium Risk    Difficulty of Paying Living Expenses: Somewhat hard   Food Insecurity: No Food Insecurity    Worried About Running Out of Food in the Last Year: Never true    Jeffry of Food in the Last Year: Never true   Transportation Needs:     Lack of Transportation (Medical):      Lack of Transportation (Non-Medical):    Physical Activity:     Days of Exercise per Week:     Minutes of Exercise per Session:    Stress:     Feeling of Stress :    Social Connections:     Frequency of Communication with Friends and Family:     Frequency of Social Gatherings with Friends and Family:     Attends [employer] Services:     Active Member of Clubs or Organizations:     Attends Club or Organization Meetings:     Marital Status:    Intimate Partner Violence:     Fear of Current or Ex-Partner:     Emotionally Abused:     Physically Abused:     Sexually Abused:        Family History:     Family History   Problem Relation Age of Onset    Heart Disease Father 61        MI    Stroke Brother     Obesity Brother     Arthritis Mother     Seizures Mother     Obesity Sister     Other Brother         pulmonary embolism    Diabetes Daughter     Seizures Brother         Allergies:   Ciprofloxacin-ciproflox hcl er, Heparin, Metformin, Niacin and related, Tramadol hcl, Ultram [tramadol], and Warfarin     Review of Systems:   Unable to assess as patient is nonverbal at this time    Constitutional: No fevers or chills. No systemic complaints  Head: No headaches  Eyes: No double vision or blurry vision. No conjunctival inflammation. ENT: No sore throat or runny nose. . No hearing loss, tinnitus or vertigo. Cardiovascular: No chest pain or palpitations. No shortness of breath. No EWING  Lung: No shortness of breath or cough. No sputum production  Abdomen: No nausea, vomiting, diarrhea, or abdominal pain. [place] No cramps. Genitourinary: No increased urinary frequency, or dysuria. No hematuria. No suprapubic or CVA pain  Musculoskeletal: No muscle aches or pains. No joint effusions, swelling or deformities  Hematologic: No bleeding or bruising. Neurologic: No headache, weakness, numbness, or tingling. Integument: No rash, no ulcers. Psychiatric: No depression. Endocrine: No polyuria, no polydipsia, no polyphagia. Physical Examination :     Patient Vitals for the past 8 hrs:   BP Temp Temp src Pulse Resp SpO2 Weight   08/02/21 1000 -- -- -- 63 8 97 % --   08/02/21 0900 -- -- -- 60 13 96 % --   08/02/21 0800 -- -- -- 64 14 99 % --   08/02/21 0710 127/88 97.6 °F (36.4 °C) Oral 62 13 98 % --   08/02/21 0700 -- -- -- 62 15 98 % --   08/02/21 0500 -- -- -- -- -- -- 248 lb (112.5 kg)   08/02/21 0320 112/65 97.6 °F (36.4 °C) Oral 62 16 98 % --     General Appearance: Awake, alert, and in no apparent distress-nonverbal  Head:  Normocephalic, no trauma  Eyes: Pupils equal, round, reactive to light and accommodation; extraocular movements intact; sclera anicteric; conjunctivae pink. No embolic phenomena. ENT: Oropharynx clear, without erythema, exudate, or thrush.  No tenderness of sinuses. Mouth/throat: mucosa pink and moist. No lesions. Dentition in good repair. Neck:Supple, without lymphadenopathy. Thyroid normal, No bruits. Pulmonary/Chest: Clear to auscultation, without wheezes, rales, or rhonchi. No dullness to percussion. Cardiovascular: Regular rate and rhythm without murmurs, rubs, or gallops. Abdomen: Soft, non tender. Bowel sounds normal. No organomegaly  All four Extremities: No cyanosis, clubbing, edema, or effusions. Neurologic: No gross sensory or motor deficits. Skin: 2 healing ulcerations on the right lower leg and 1 left foot wound status post I&D sutures clean dry intact  Medical Decision Making -Laboratory:   I have independently reviewed/ordered the following labs:    CBC with Differential:   Recent Labs     08/01/21  0537 08/02/21  0608   WBC 7.6 7.9   HGB 14.1 14.1   HCT 40.3* 40.7    320   LYMPHOPCT 26 28   MONOPCT 8 7     BMP:   Recent Labs     08/01/21  0537 08/02/21  0608    138   K 3.4* 4.0   * 107   CO2 20 20   BUN 6 10   CREATININE 0.56* 0.60*     Hepatic Function Panel:   No results for input(s): PROT, LABALBU, BILIDIR, IBILI, BILITOT, ALKPHOS, ALT, AST in the last 72 hours. No results for input(s): RPR in the last 72 hours. No results for input(s): HIV in the last 72 hours. No results for input(s): BC in the last 72 hours. Lab Results   Component Value Date    MUCUS Present 10/17/2015    PH 7.40 06/26/2020    PH 5.5 01/04/2012    RBC 4.74 08/02/2021    RBC 4.93 03/26/2012    WBC 7.9 08/02/2021    YEAST NONE SEEN 08/12/2019     Lab Results   Component Value Date    CREATININE 0.60 08/02/2021    GLUCOSE 165 08/02/2021    GLUCOSE 189 11/12/2015       Medical Decision Making-Imaging:   XR FOOT LEFT (MIN 3 VIEWS)     Result Date: 7/20/2021  PROCEDURE: XR FOOT LEFT (MIN 3 VIEWS) CLINICAL INFORMATION: pain COMPARISON: No prior study. TECHNIQUE: 4 views of the foot were obtained.  FINDINGS: No acute fracture or dislocation is seen. There is no soft tissue swelling. There is a moderate-sized plantar calcaneal spur.      Plantar calcaneal spur. Otherwise normal left foot. **This report has been created using voice recognition software. It may contain minor errors which are inherent in voice recognition technology. ** Final report electronically signed by Dr. Prosper East on 7/20/2021 3:57 PM     CT TIBIA FIBULA LEFT WO CONTRAST     Result Date: 7/20/2021  PROCEDURE: NONCONTRAST CT LEFT TIBIA/FIBULA: CLINICAL INFORMATION: Infection/ abscess TECHNIQUE: Multiple axial 3 mm images of the left tibia/fibula were obtained without administration of intravenous contrast material. Computer generated sagittal and coronal images of the left tibia/fibula were also reconstructed. ALL CT SCANS AT THIS FACILITY use dose modulation, iterative reconstruction, and/or weight-based dosing when appropriate to reduce radiation dose to as low as reasonably achievable. FINDINGS: There are bony defects in the distal femur and proximal tibia conjunction with prior anterior crucial ligament repair. No acute fracture is seen. Appears be a small bone cyst in the lateral malleolus. There is mild degenerative spurring of the distal femur. Note that there appear to be multiple superficial varicosities in the distal left calf      1. Degenerative changes of the left knee. Postsurgical changes left knee. Multiple varicosities this left calf. 2. Study otherwise unremarkable. No tibial or fibular fracture is seen. **This report has been created using voice recognition software. It may contain minor errors which are inherent in voice recognition technology. ** Final report electronically signed by Dr. Prosper East on 7/20/2021 9:07 PM       Medical Decision [facility]:     7/23/2021  2:48 PM - Westley Mendez Incoming Lab Results From Soft    Specimen Information: Heel        Component Collected Lab   Anaerobic Culture 07/20/2021  6:15  [facility] Culture yielded heavy mixed growth which included anaerobic gram negative bacilli and anaerobic gram positive cocci. If a true mixed aerobic and anaerobic infection is suspected, then broad spectrum empiric antibiotic therapy is indicated and should include coverage for anaerobic organisms. Gram Stain Result 07/20/2021  6:15  [facility]   Few segmented neutrophils observed. Rare epithelial cells observed. Many gram positive cocci occurring singly and in pairs. Organism Abnormal  07/20/2021  6:15  [facility]   Staphylococcus aureus    Aerobic Culture 07/20/2021  6:15  [facility]   light growth This is a MRSA (Methicillin Resistant Staphylococcus aureus)isolate. Isolates of MRSA (ORSA) Methicillin (Oxacillin) Resistant Staphylococcus aureus (coagulase positive) require patient be placed in CONTACT isolation. Methicillin(Oxacillin)resistant strains of staphylococci (MRSA)or(MRSE)should be considered resistant to all classes of cephalosporins, penems and beta-lactams. In the treatment of gram positive infections, GENTAMICIN should be CONSIDERED a SYNERGYSTIC agent ONLY. Ciprofloxacin and Levofloxacin, regardless of in vitro sensitivity, should not be used for staphylococcal infections other than uncomplicated lower UTIs. Testing Performed By    [address] Name Director Address Valid Date Range   [facility] [address] [facility] Darrin Epley, MD [address] [place] [id number] 08/30/17 0855-Present   Narrative  Performed by: [facility]  Source: heel       Site: swab + tissue left          Current Antibiotics: Vancomycin   Susceptibility    Staphylococcus aureus (1)    Antibiotic Interpretation JODI Status   gentamicin Sensitive <=0.5 mcg/mL Final   ICR (D test) Negative Neg  mcg/mL Final    (Dtest) ICR- inducible clinda resistance    If +, then inducible erm gene       present. Clindamycin may be       effective in some patients.       oxacillin Resistant >=4 mcg/mL Final   clindamycin Sensitive <=0.25 mcg/mL Final   trimethoprim-sulfamethoxazole Sensitive <=10 mcg/mL Final   vancomycin Sensitive <=0.5 mcg/mL Final   tetracycline Sensitive <=1 mcg/mL Final   Lab and Collection    Culture, Anaerobic and Aerobic - 7/20/2021  Result History    Culture, Anaerobic and Aerobic on 7/23/2021 - Result Edited       Medical Decision Making-Other:     Note:  · Labs, medications, radiologic studies were reviewed with personal review of films  · Large amounts of data were reviewed  · Discussed with nursing Staff, Discharge planner  · Infection Control and Prevention measures reviewed  · All prior entries were reviewed  · Administer medications as ordered  · Prognosis: Fair  · Discharge planning reviewed    Thank you for allowing us to participate in the care of this patient. Please call with questions.     Electronically signed by Bhupinder Larry DPM on 8/2/2021 at 10:52 AM

## 2021-08-02 NOTE — CARE COORDINATION
[facility] Quality Flow/Interdisciplinary Rounds Progress Note    Quality Flow Rounds held on August 2, 2021 at [address] Attending:  Charge RN, CM, Bedside RN, RN Supervisor, CM    Barriers to Discharge: Needs MRI brain today    Anticipated Discharge Date:  Expected Discharge Date: 08/02/21    Anticipated Discharge Disposition:    Readmission Risk              Risk of Unplanned Readmission:  19           Discussed patient goal for the day, patient clinical progression, and barriers to discharge. The following Goal(s) of the Day/Commitment(s) have been identified:    Spoke to pt and he is still interested in going to [facility] for ARU after D/C. Called [facility] and spoke to [employer] and she will have her physician look at PT/OT notes but a PM&R consult is always helpful. [address] MD for PM&R consult. [address] calls from [facility]. Their MD would like a ST consult to evaluate cognition. DAISY MARTINO for this.           Piper Fitch, JEWEL  August 2, 2021

## 2021-08-02 NOTE — PLAN OF CARE
Problem: Physical Regulation:  Goal: Will remain free from infection  Description: Will remain free from infection  8/2/2021 0201 by Catherine Lacy RN  Outcome: Ongoing     Problem: Skin Integrity:  Goal: Demonstration of wound healing without infection will improve  Description: Demonstration of wound healing without infection will improve  8/2/2021 0201 by Catherine Lacy RN  Outcome: Ongoing

## 2021-08-02 NOTE — PROGRESS NOTES
[address] [facility]  Speech Language Pathology    Date: 8/2/2021  Patient Name: Pierre Bridges  YOB: 1970   AGE: 46 y.o. MRN: [id number]        Patient Not Available for Speech Therapy     Due to:  [] Testing  [] Hemodialysis  [] Cancelled by RN  [] Surgery   [] Intubation/Sedation/Pain Medication  [] Medical instability  [x] Other: Spoke with RN, pt. Tolerating all PO. No need for bedside swallow study at this time. Next scheduled treatment: re-consult if necessary    Completed by: Hermilo Gary, SLP, M.A.  JOHN-SLP

## 2021-08-02 NOTE — PROGRESS NOTES
[facility]  Office: [address], DO, Carlotta Warner, DO, Jenniffer Noel, DO, Edison Aguirre Blood, DO, Adam Baires MD, Bethanie Root MD, Matti Rose MD, Guy Mary MD, Ameena Ricketts MD, Kiya Morales MD, Syed Gay MD, Mikhail Pickard, DO, Meghan Friend MD, Rebekah Smith DO, Fabrizio Carter MD,  Dexter Collazo DO, Mariela Mejia MD, Yudi Dobson MD, Tana Key MD, Joseph Medeiros MD, Anuradha Penaloza MD, Rupa Alexander MD, Pj Walter MD, Analy Helm, [facility], [facility], CNP, Lilliam Soto, CNP, Primo Treviño, CNS, Tricia Ashford, CNP, Theodore Klein, [facility], Rowan Haque, CNP, Mason Ambrose, CNP, Enmanuel Benz, CNP, ASHLEY Robertson-C, Payal Ríos, [facility], Carter Flores, CNP, Milo Minaya, CNP, Ken Griffin, CNP, Yi Ellison, CNP, Bravo Keller, CNP, Go Ny, CNP, Vaughn Nissen, CNP, Moses Ramos, [address] [place] [address]    Progress Note    8/2/2021    4:58 PM    Name:   Kim Raza  MRN:     [id number]     Acct:      [de-identified]   Room:   [address] [place] [address]  IP Day:  6  Admit Date:  7/27/2021  3:42 PM    PCP:   Anupam Jara MD  Code Status:  Full Code    Subjective:     C/C: Seizure    Interval History Status: slight improvement. Patient seen and examined at bedside. Lying in bed. No acute events overnight  MRI pending  Discussed with wife Alexa Figures at bedside  Considering ARU, PMR consult obtained as well as SLP    Brief History:   Per my colleagues note: \"This is a 80-year-old male who was transferred here from [facility] for LTM E. He initially presented to the hospital for treatment of right lower extremity cellulitis. He was evaluated by podiatry and started on vancomycin and Zosyn and was transition to Vancomycin after cultures positive for MRSA. It was recommended he be discharged from OSH with bactrim. Otherwise, currently being worked up for altered mental status. Found to have elevated ammonia due to depakote. Also there was concern for repeat seizure activity so pt was placed back on LTME 7/30\"    Review of Systems:       Constitutional:  negative for chills, fevers, sweats  Respiratory:  negative for cough, dyspnea on exertion, shortness of breath, wheezing  Cardiovascular:  negative for chest pain, chest pressure/discomfort, lower extremity edema, palpitations  Gastrointestinal:  negative for abdominal pain, constipation, diarrhea, nausea, vomiting  Neurological:  negative for dizziness, headache    Medications: Allergies:     Allergies   Allergen Reactions    Ciprofloxacin-Ciproflox Hcl Er Other (See Comments)     Elevated creatinine    Heparin      Monitor for thrombocytopenia    Metformin Nausea Only     Abdominal pain, diarrhea    Niacin And Related Other (See Comments)     Burning sensation, severe flushing    Tramadol Hcl      Contraindication to seizure medications    Ultram [Tramadol]      Contraindication to seizure medications    Warfarin      Very difficult to regulate in past       Current Meds:   Scheduled Meds:    doxycycline hyclate  100 mg Oral 2 times per day    metoprolol tartrate  25 mg Oral BID    gabapentin  400 mg Oral 4x Daily    divalproex  500 mg Oral BID    lactulose  20 g Oral TID    insulin glargine  47 Units Subcutaneous BID    zonisamide  500 mg Oral Nightly    DULoxetine  60 mg Oral Daily    pantoprazole  40 mg Oral BID    levETIRAcetam  2,000 mg Oral BID    rOPINIRole  1 mg Oral TID    insulin lispro  0-12 Units Subcutaneous TID WC    insulin lispro  0-6 Units Subcutaneous Nightly    lacosamide  200 mg Oral BID    lactobacillus  1 capsule Oral Daily    sodium chloride flush  5-40 mL Intravenous 2 times per day    enoxaparin  40 mg Subcutaneous Daily     Continuous Infusions:    dextrose      sodium chloride       PRN Meds: LORazepam, glucose, dextrose, glucagon (rDNA), dextrose, sodium chloride flush, sodium chloride, potassium chloride **OR** potassium alternative oral replacement **OR** potassium chloride, magnesium sulfate, ondansetron **OR** ondansetron, polyethylene glycol, acetaminophen **OR** acetaminophen    Data:     Past Medical History:   has a past medical history of Abnormal thyroid biopsy, Acid reflux, Anemia, Anesthesia, Bipolar disorder ([facility] Blood clot in vein, Cancer ([facility] [facility] Chest pain, Chronic back pain, Chronic bronchitis ([facility] [facility] Colon polyp, Depression, DVT (deep venous thrombosis) ([facility] [facility] Esophageal abnormality, Fatty liver disease, nonalcoholic, Furuncle, History of Doppler ultrasound, History of pulmonary embolism, Hx of blood clots, Hyperlipidemia, Hypertension, Intracranial arachnoid cyst, Irritable bowel syndrome, Liver disease, MDRO (multiple drug resistant organisms) resistance, MRSA (methicillin resistant staph aureus) culture positive, Nephrolithiasis, Neuropathy, Pancreatic insufficiency, Positive SANDY (antinuclear antibody), Prolonged emergence from general anesthesia, Restless legs syndrome, Seizures ([facility] [facility] Sinus tachycardia, Skull fracture ([facility] [facility] Sleep apnea, Systolic dysfunction, and Type II or unspecified type diabetes mellitus without mention of complication, not stated as uncontrolled. Social History:   reports that he has never smoked. He has never used smokeless tobacco. He reports that he does not drink alcohol and does not use drugs.      Family History:   Family History   Problem Relation Age of Onset    Heart Disease Father 61        MI    Stroke Brother     Obesity Brother     Arthritis Mother     Seizures Mother     Obesity Sister     Other Brother         pulmonary embolism    Diabetes Daughter     Seizures Brother        Vitals:  BP (!) 149/84   Pulse 67   Temp 98.6 °F (37 °C) (Oral)   Resp 13   Ht 6' 2\" (1.88 m)   Wt 248 lb (112.5 kg)   SpO2 98%   BMI 31.84 kg/m²   Temp (24hrs), Av °F (36.7 °C), Min:97.6 °F (36.4 °C), Max:98.6 °F (37 °C)    Recent Labs 08/01/21  1930 08/02/21  0731 08/02/21  1131 08/02/21  1602   POCGLU 147* 235* 137* 174*       I/O (24Hr): Intake/Output Summary (Last 24 hours) at 8/2/2021 1658  Last data filed at 8/1/2021 1813  Gross per 24 hour   Intake --   Output 1700 ml   Net -1700 ml       Labs:  Hematology:  Recent Labs     07/31/21  0758 08/01/21  0537 08/02/21  0608   WBC 7.4 7.6 7.9   RBC 4.65 4.71 4.74   HGB 14.0 14.1 14.1   HCT 40.8 40.3* 40.7   MCV 87.7 85.6 85.9   MCH 30.1 29.9 29.7   MCHC 34.3 35.0* 34.6   RDW 12.2 11.9 12.0    263 320   MPV 9.8 10.4 10.7   SEDRATE 4  --   --    CRP <3.0  --   --      Chemistry:  Recent Labs     07/31/21  0758 08/01/21  0537 08/02/21  0608    142 138   K 3.6* 3.4* 4.0   * 109* 107   CO2 22 20 20   GLUCOSE 182* 142* 165*   BUN 6 6 10   CREATININE 0.64* 0.56* 0.60*   ANIONGAP 8* 13 11   LABGLOM >60 >60 >60   GFRAA >60 >60 >60   CALCIUM 8.9 8.7 8.4*     Recent Labs     08/01/21  1130 08/01/21  1553 08/01/21  1930 08/02/21  0731 08/02/21  1131 08/02/21  1602   POCGLU 186* 199* 147* 235* 137* 174*     ABG:  Lab Results   Component Value Date    PH 7.40 06/26/2020    PH 5.5 01/04/2012    PCO2 40 06/26/2020    PO2 71 06/26/2020    HCO3 25 06/26/2020    O2SAT 94 06/26/2020     No results found for: SPECIAL  No results found for: CULTURE    Radiology:  No results found. Physical Examination:        General appearance:  Alert, cooperative and no distress. Inconsistently responding to commands  Mental Status:  oriented to person and place and time and normal affect  Lungs:  clear to auscultation bilaterally, normal effort  Heart:  regular rate and rhythm, no murmur  Abdomen:  soft, nontender, nondistended, normal bowel sounds, no masses, hepatomegaly, splenomegaly  Extremities:  no edema, redness, tenderness in the calves.  Right anterior shin ulcerations, Left foot wound  Skin:  no gross lesions, rashes, induration    Assessment:        Hospital Problems         Last Modified POA * (Principal) Seizures ([facility] 7/27/2021 Yes    Hypertension 7/27/2021 Yes    Type 2 diabetes mellitus with diabetic neuropathy ([facility] [address] 7/27/2021 Yes    Neuropathy 7/27/2021 Yes    Bipolar affective disorder ([facility] [address] 7/27/2021 Yes    Partial symptomatic epilepsy with complex partial seizures, not intractable, without status epilepticus ([facility] 7/27/2021 Yes    Gastroesophageal reflux disease 7/27/2021 Yes    Diabetic leg ulcer ([facility] 7/27/2021 Yes    Partial symptomatic epilepsy with complex partial seizures, intractable, without status epilepticus ([facility] 7/28/2021 Yes    Episodic confusion 7/28/2021 Yes    Acute metabolic encephalopathy 8/9/1134 Yes          Plan:        1. Encephalopathy with unclear etiology. Can be secondary to toxic metabolic encephalopathy due to cellulitis. Patient has a history of MRSA with right leg ulcerations as well as left heel wound. Podiatry consult pending. ID on board. Neurology work-up in process. MRI pending. Will recheck ammonia in AM.  Consider decreasing/stopping lactulose  2. Abx adjusted to doxycycline 100mg PO BID by ID. Stop date 8/12/2021.  3. Dressing changes. Further recommendation per podiatry  4. A1c 8.3. On basal insulin along with sliding scale coverage. Monitor for hypoglycemia. Resume home gabapentin dosage if ok with neurology  5. Seizure history with neurology on board. Continue AEDs as recommended. 6. Continue current antihypertensives  7. DVT/GI prophylaxis  8.  PM&R consult    Rupa Alexander MD  8/2/2021  4:58 PM

## 2021-08-02 NOTE — PROGRESS NOTES
Speech Language Pathology  Facility/Department: [facility]  Initial Speech/Language/Cognitive Assessment    NAME: Kim Raza  : 1970   MRN: [id number]  ADMISSION DATE: 2021  ADMITTING DIAGNOSIS: has Depression; Hypertension; Liver disease; Restless legs syndrome; Chronic back pain; Other chest pain; Irritable bowel syndrome; Non compliance w medication regimen; Hypertension associated with diabetes ([address] Hyperlipidemia with target LDL less than 100; IBS (irritable bowel syndrome); Sinus tachycardia; Confusion; Encephalopathy; Diabetes mellitus type II, uncontrolled ([address] Dizziness; Neuropathy; Hyperammonemia ([address] Medication overdose, insulin; Suicide attempt ([address] Hypoglycemia; Hypokalemia; Lithium toxicity; Nausea & vomiting; Snoring; Hypogonadism; Erectile dysfunction due to diseases classified elsewhere; Rash; Headaches due to old head injury; Abdominal wall pain in left lower quadrant; Bipolar affective disorder ([address] Hypogonadism in male; Type 2 diabetes mellitus with diabetic neuropathy ([address] Hypomagnesemia; Partial seizure ([address] Syncope and collapse; Headache disorder; Partial symptomatic epilepsy with complex partial seizures, not intractable, without status epilepticus ([address] Respiratory acidosis; Essential hypertension; Gastritis; Gastroesophageal reflux disease; Mixed hyperlipidemia; Acute lateral meniscus tear of right knee; Recurrent right inguinal hernia; Bicipital tendinitis of right shoulder; Carpal tunnel syndrome of right wrist; Obesity (BMI 30-39.9); Lump of axilla; Familial hypercholesterolemia; Coronary artery disease of native artery of native heart with stable angina pectoris ([address] Exertional dyspnea; Vitamin D deficiency; Seizure ([address] Nausea and vomiting; Type II diabetes mellitus with manifestations, uncontrolled ([address] Chondromalacia of knee, right; Effusion of left knee; Elevated levels of transaminase & lactic acid dehydrogenase;  Other fevers. Pain:  Pain Assessment  Pain Assessment: 0-10  Pain Level: 0    Assessment:  Pt presents with mild-severe cognitive deficits characterized by difficulty with thought organization, immediate and delayed recall, word generation and abstract reasoning. Pt. Presents with no dysarthria, no O/M deficits at this time. Increased processing time noted throughout evaluation. Wife reports pt. Is currently not functioning at baseline. ST to follow up and provide treatment to address noted deficits. Education provided. Recommendations:  Requires SLP Intervention: Yes  Duration/Frequency of Treatment: 3-5 x week  D/C Recommendations: Further therapy recommended at discharge. Plan:   Goals:  Short-term Goals  Goal 1: Pt. will complete abstract reasoning tasks with 90% accuracy. Goal 2: Pt. will recall 3-5 units with and without distractions with 90% accuracy. Goal 3: Pt. will utilize memory compensatory strategies to aid in recall. Goal 4: Pt. will generate 3-5 members of a category with 90% accuracy. Patient/family involved in developing goals and treatment plan: yes    Subjective:  General  Chart Reviewed: Yes  Family / Caregiver Present: Yes (wife)  Social/Functional History  Lives With: Family  Occupation: On disability  Vision  Vision: Impaired (wears glasses)  Hearing  Hearing: Within functional limits           Objective:     Oral/Motor  Oral Motor: Within functional limits    Expression  Primary Mode of Expression: Verbal    Motor Speech  Motor Speech: Within Functional Limits         Cognition:      Orientation  Overall Orientation Status: Within Functional Limits  Attention  Attention: Within Functional Limits  Memory  Memory: Exceptions to [facility]  Short-term Memory: Moderate (0/3 increased to 3/3 with mod verbal cues, 1/3)  Immediate Memory: Moderate (0/3, 2/5, 3/3)  Problem Solving  Problem Solving: Exceptions to [facility]  Verbal Reasoning Skills:  Moderate-Severe (Antonyms: 1/3, Inductive Reasonin/4, Deductive Reasoning: NT, Similarities/Differences: NT)  Safety/Judgement  Safety/Judgement: Exceptions to [facility]  Insight: Mild   (2/3)  Flexibility of Thought: Severe (0/3)  Word Generation:  Severe  Word Associations:  Severe (0/3)  Verbal Sequencing:  WFL    Prognosis:  Speech Therapy Prognosis  Prognosis: Fair  Individuals consulted  Consulted and agree with results and recommendations: Patient;RN;Family member  Family member consulted: wife    Education:  Patient Education: yes  Patient Education Response: Verbalizes understanding          Therapy Time:   Individual Concurrent Group Co-treatment   Time In 1326         Time Out 1343         Minutes Michelle Bill M.A.  CCC-SLP  2021 3:07 PM

## 2021-08-03 LAB
ABSOLUTE EOS #: 0.15 K/UL (ref 0–0.44)
ABSOLUTE IMMATURE GRANULOCYTE: 0.17 K/UL (ref 0–0.3)
ABSOLUTE LYMPH #: 2.17 K/UL (ref 1.1–3.7)
ABSOLUTE MONO #: 0.59 K/UL (ref 0.1–1.2)
AMMONIA: 102 UMOL/L (ref 16–60)
ANION GAP SERPL CALCULATED.3IONS-SCNC: 12 MMOL/L (ref 9–17)
BASOPHILS # BLD: 1 % (ref 0–2)
BASOPHILS ABSOLUTE: 0.04 K/UL (ref 0–0.2)
BUN BLDV-MCNC: 11 MG/DL (ref 6–20)
BUN/CREAT BLD: ABNORMAL (ref 9–20)
CALCIUM SERPL-MCNC: 8.5 MG/DL (ref 8.6–10.4)
CHLORIDE BLD-SCNC: 108 MMOL/L (ref 98–107)
CO2: 21 MMOL/L (ref 20–31)
CREAT SERPL-MCNC: 0.7 MG/DL (ref 0.7–1.2)
DIFFERENTIAL TYPE: ABNORMAL
EOSINOPHILS RELATIVE PERCENT: 2 % (ref 1–4)
GFR AFRICAN AMERICAN: >60 ML/MIN
GFR NON-AFRICAN AMERICAN: >60 ML/MIN
GFR SERPL CREATININE-BSD FRML MDRD: ABNORMAL ML/MIN/{1.73_M2}
GFR SERPL CREATININE-BSD FRML MDRD: ABNORMAL ML/MIN/{1.73_M2}
GLUCOSE BLD-MCNC: 142 MG/DL (ref 75–110)
GLUCOSE BLD-MCNC: 159 MG/DL (ref 75–110)
GLUCOSE BLD-MCNC: 172 MG/DL (ref 75–110)
GLUCOSE BLD-MCNC: 232 MG/DL (ref 75–110)
GLUCOSE BLD-MCNC: 236 MG/DL (ref 70–99)
HCT VFR BLD CALC: 40.1 % (ref 40.7–50.3)
HEMOGLOBIN: 14.1 G/DL (ref 13–17)
IMMATURE GRANULOCYTES: 2 %
LYMPHOCYTES # BLD: 30 % (ref 24–43)
MCH RBC QN AUTO: 29.9 PG (ref 25.2–33.5)
MCHC RBC AUTO-ENTMCNC: 35.2 G/DL (ref 28.4–34.8)
MCV RBC AUTO: 85.1 FL (ref 82.6–102.9)
MONOCYTES # BLD: 8 % (ref 3–12)
NRBC AUTOMATED: 0 PER 100 WBC
PDW BLD-RTO: 12.1 % (ref 11.8–14.4)
PLATELET # BLD: 228 K/UL (ref 138–453)
PLATELET ESTIMATE: ABNORMAL
PMV BLD AUTO: 9.9 FL (ref 8.1–13.5)
POTASSIUM SERPL-SCNC: 3.6 MMOL/L (ref 3.7–5.3)
RBC # BLD: 4.71 M/UL (ref 4.21–5.77)
RBC # BLD: ABNORMAL 10*6/UL
SEG NEUTROPHILS: 57 % (ref 36–65)
SEGMENTED NEUTROPHILS ABSOLUTE COUNT: 4.19 K/UL (ref 1.5–8.1)
SODIUM BLD-SCNC: 141 MMOL/L (ref 135–144)
WBC # BLD: 7.3 K/UL (ref 3.5–11.3)
WBC # BLD: ABNORMAL 10*3/UL

## 2021-08-03 PROCEDURE — 6370000000 HC RX 637 (ALT 250 FOR IP): Performed by: FAMILY MEDICINE

## 2021-08-03 PROCEDURE — 6360000002 HC RX W HCPCS: Performed by: STUDENT IN AN ORGANIZED HEALTH CARE EDUCATION/TRAINING PROGRAM

## 2021-08-03 PROCEDURE — 36415 COLL VENOUS BLD VENIPUNCTURE: CPT

## 2021-08-03 PROCEDURE — 2060000000 HC ICU INTERMEDIATE R&B

## 2021-08-03 PROCEDURE — 6360000002 HC RX W HCPCS: Performed by: NURSE PRACTITIONER

## 2021-08-03 PROCEDURE — 6370000000 HC RX 637 (ALT 250 FOR IP): Performed by: CLINICAL NURSE SPECIALIST

## 2021-08-03 PROCEDURE — 6370000000 HC RX 637 (ALT 250 FOR IP): Performed by: NURSE PRACTITIONER

## 2021-08-03 PROCEDURE — 97130 THER IVNTJ EA ADDL 15 MIN: CPT

## 2021-08-03 PROCEDURE — 82140 ASSAY OF AMMONIA: CPT

## 2021-08-03 PROCEDURE — 97110 THERAPEUTIC EXERCISES: CPT

## 2021-08-03 PROCEDURE — 2580000003 HC RX 258: Performed by: CLINICAL NURSE SPECIALIST

## 2021-08-03 PROCEDURE — 80048 BASIC METABOLIC PNL TOTAL CA: CPT

## 2021-08-03 PROCEDURE — 99232 SBSQ HOSP IP/OBS MODERATE 35: CPT | Performed by: NURSE PRACTITIONER

## 2021-08-03 PROCEDURE — 97129 THER IVNTJ 1ST 15 MIN: CPT

## 2021-08-03 PROCEDURE — 97530 THERAPEUTIC ACTIVITIES: CPT

## 2021-08-03 PROCEDURE — 82947 ASSAY GLUCOSE BLOOD QUANT: CPT

## 2021-08-03 PROCEDURE — 99222 1ST HOSP IP/OBS MODERATE 55: CPT | Performed by: PHYSICAL MEDICINE & REHABILITATION

## 2021-08-03 PROCEDURE — 99232 SBSQ HOSP IP/OBS MODERATE 35: CPT | Performed by: INTERNAL MEDICINE

## 2021-08-03 PROCEDURE — 97535 SELF CARE MNGMENT TRAINING: CPT

## 2021-08-03 PROCEDURE — 6370000000 HC RX 637 (ALT 250 FOR IP): Performed by: INTERNAL MEDICINE

## 2021-08-03 PROCEDURE — 6370000000 HC RX 637 (ALT 250 FOR IP)

## 2021-08-03 PROCEDURE — 6370000000 HC RX 637 (ALT 250 FOR IP): Performed by: STUDENT IN AN ORGANIZED HEALTH CARE EDUCATION/TRAINING PROGRAM

## 2021-08-03 PROCEDURE — 99232 SBSQ HOSP IP/OBS MODERATE 35: CPT | Performed by: STUDENT IN AN ORGANIZED HEALTH CARE EDUCATION/TRAINING PROGRAM

## 2021-08-03 PROCEDURE — 85025 COMPLETE CBC W/AUTO DIFF WBC: CPT

## 2021-08-03 RX ORDER — DIVALPROEX SODIUM 250 MG/1
250 TABLET, DELAYED RELEASE ORAL DAILY
Status: DISCONTINUED | OUTPATIENT
Start: 2021-08-04 | End: 2021-08-05 | Stop reason: HOSPADM

## 2021-08-03 RX ORDER — DIVALPROEX SODIUM 500 MG/1
500 TABLET, DELAYED RELEASE ORAL NIGHTLY
Status: DISCONTINUED | OUTPATIENT
Start: 2021-08-03 | End: 2021-08-05 | Stop reason: HOSPADM

## 2021-08-03 RX ORDER — INSULIN GLARGINE 100 [IU]/ML
25 INJECTION, SOLUTION SUBCUTANEOUS ONCE
Status: COMPLETED | OUTPATIENT
Start: 2021-08-03 | End: 2021-08-03

## 2021-08-03 RX ORDER — LACTULOSE 10 G/15ML
10 SOLUTION ORAL 3 TIMES DAILY
Status: DISCONTINUED | OUTPATIENT
Start: 2021-08-03 | End: 2021-08-05 | Stop reason: HOSPADM

## 2021-08-03 RX ADMIN — LACOSAMIDE 200 MG: 100 TABLET, FILM COATED ORAL at 08:02

## 2021-08-03 RX ADMIN — METOPROLOL TARTRATE 25 MG: 25 TABLET ORAL at 08:00

## 2021-08-03 RX ADMIN — LACOSAMIDE 200 MG: 100 TABLET, FILM COATED ORAL at 20:26

## 2021-08-03 RX ADMIN — INSULIN GLARGINE 25 UNITS: 100 INJECTION, SOLUTION SUBCUTANEOUS at 10:43

## 2021-08-03 RX ADMIN — GABAPENTIN 400 MG: 400 CAPSULE ORAL at 16:57

## 2021-08-03 RX ADMIN — LACTULOSE 10 G: 20 SOLUTION ORAL at 20:26

## 2021-08-03 RX ADMIN — DIVALPROEX SODIUM 500 MG: 500 TABLET, DELAYED RELEASE ORAL at 08:01

## 2021-08-03 RX ADMIN — ROPINIROLE HYDROCHLORIDE 1 MG: 1 TABLET, FILM COATED ORAL at 08:00

## 2021-08-03 RX ADMIN — SODIUM CHLORIDE, PRESERVATIVE FREE 10 ML: 5 INJECTION INTRAVENOUS at 20:33

## 2021-08-03 RX ADMIN — METOPROLOL TARTRATE 25 MG: 25 TABLET ORAL at 20:27

## 2021-08-03 RX ADMIN — DOXYCYCLINE HYCLATE 100 MG: 100 TABLET, COATED ORAL at 20:30

## 2021-08-03 RX ADMIN — INSULIN LISPRO 2 UNITS: 100 INJECTION, SOLUTION INTRAVENOUS; SUBCUTANEOUS at 16:56

## 2021-08-03 RX ADMIN — INSULIN LISPRO 4 UNITS: 100 INJECTION, SOLUTION INTRAVENOUS; SUBCUTANEOUS at 08:16

## 2021-08-03 RX ADMIN — PANTOPRAZOLE SODIUM 40 MG: 40 TABLET, DELAYED RELEASE ORAL at 07:59

## 2021-08-03 RX ADMIN — DULOXETINE HYDROCHLORIDE 60 MG: 30 CAPSULE, DELAYED RELEASE ORAL at 08:00

## 2021-08-03 RX ADMIN — LEVETIRACETAM 2000 MG: 500 TABLET, FILM COATED ORAL at 07:54

## 2021-08-03 RX ADMIN — SODIUM CHLORIDE, PRESERVATIVE FREE 5 ML: 5 INJECTION INTRAVENOUS at 09:00

## 2021-08-03 RX ADMIN — GABAPENTIN 400 MG: 400 CAPSULE ORAL at 12:52

## 2021-08-03 RX ADMIN — INSULIN GLARGINE 47 UNITS: 100 INJECTION, SOLUTION SUBCUTANEOUS at 20:51

## 2021-08-03 RX ADMIN — INSULIN LISPRO 1 UNITS: 100 INJECTION, SOLUTION INTRAVENOUS; SUBCUTANEOUS at 20:51

## 2021-08-03 RX ADMIN — ZONISAMIDE 500 MG: 100 CAPSULE ORAL at 20:30

## 2021-08-03 RX ADMIN — ROPINIROLE HYDROCHLORIDE 1 MG: 1 TABLET, FILM COATED ORAL at 12:51

## 2021-08-03 RX ADMIN — PANTOPRAZOLE SODIUM 40 MG: 40 TABLET, DELAYED RELEASE ORAL at 20:27

## 2021-08-03 RX ADMIN — DIVALPROEX SODIUM 500 MG: 500 TABLET, DELAYED RELEASE ORAL at 20:30

## 2021-08-03 RX ADMIN — DOXYCYCLINE HYCLATE 100 MG: 100 TABLET, COATED ORAL at 08:01

## 2021-08-03 RX ADMIN — INSULIN LISPRO 2 UNITS: 100 INJECTION, SOLUTION INTRAVENOUS; SUBCUTANEOUS at 11:49

## 2021-08-03 RX ADMIN — GABAPENTIN 400 MG: 400 CAPSULE ORAL at 08:00

## 2021-08-03 RX ADMIN — ENOXAPARIN SODIUM 40 MG: 40 INJECTION SUBCUTANEOUS at 08:01

## 2021-08-03 RX ADMIN — ROPINIROLE HYDROCHLORIDE 1 MG: 1 TABLET, FILM COATED ORAL at 20:27

## 2021-08-03 RX ADMIN — Medication 1 CAPSULE: at 08:00

## 2021-08-03 RX ADMIN — LEVETIRACETAM 2000 MG: 500 TABLET, FILM COATED ORAL at 20:30

## 2021-08-03 RX ADMIN — GABAPENTIN 400 MG: 400 CAPSULE ORAL at 20:30

## 2021-08-03 RX ADMIN — ENOXAPARIN SODIUM 30 MG: 30 INJECTION SUBCUTANEOUS at 21:31

## 2021-08-03 RX ADMIN — LACTULOSE 20 G: 20 SOLUTION ORAL at 12:52

## 2021-08-03 ASSESSMENT — PAIN DESCRIPTION - FREQUENCY: FREQUENCY: CONTINUOUS

## 2021-08-03 ASSESSMENT — PAIN DESCRIPTION - PAIN TYPE
TYPE: ACUTE PAIN
TYPE: ACUTE PAIN

## 2021-08-03 ASSESSMENT — PAIN SCALES - GENERAL
PAINLEVEL_OUTOF10: 0
PAINLEVEL_OUTOF10: 8
PAINLEVEL_OUTOF10: 5
PAINLEVEL_OUTOF10: 0

## 2021-08-03 ASSESSMENT — PAIN DESCRIPTION - LOCATION
LOCATION: FOOT
LOCATION: FOOT

## 2021-08-03 ASSESSMENT — PAIN DESCRIPTION - ORIENTATION
ORIENTATION: LEFT
ORIENTATION: LEFT

## 2021-08-03 ASSESSMENT — PAIN DESCRIPTION - DESCRIPTORS: DESCRIPTORS: ACHING;CONSTANT

## 2021-08-03 NOTE — CONSULTS
Physical Medicine & Rehabilitation  Consult Note      Admitting Physician: Antony Boss MD    Primary Care Provider: Chon Cason MD     Reason for Consult:  Acute Inpatient Rehabilitation    Chief Complaint: Seizures    History of Present Illness:  Referring Provider is requesting an evaluation for appropriate placement upon discharge from acute care. Mr. Zeeshan Luevano is a 46 y.o.  male who was admitted to [place] [address] on 7/27/2021 with No chief complaint on file. 51-year-old male admitted to [address] 1 week ago for cellulitis right lower extremity. He was treated with Vanco and Zosyn. Wife felt he was slow to respond tolerate due to pain medication. .  Noted staring spells.-Patient has history of complex seizures and sees a neurologist in [place]. An abnormal EEG patient transferred to [place]. Andi Notes had similar episode after thyroid surgery in 2021-placement is a 10 days eventually woke up and was back to baseline    Infectious disease 8/2 -doxycycline for MRSA right leg ulceration, left heel wound, DC'd Vanco, continue wound care    Internal medicine-encephalopathy of questionable etiology possibly toxic metabolic cellulitis doxycycline through 8/12/2021 adjusting insulin    Neurology 8/2 -toxic metabolic encephalopathy in setting of hyper ammonia, MRSA cellulitis and possible recent breakthrough seizure, MRI brain pending, no seizures LTM E-continue lactulose, Depakote, Keppra, zonisamide and Vimpat    Podiatry 7/31 -status post incision and drainage of left heel, able to bear weight on forefoot only given patient status recommend nonweightbearing left lower extremity continue dressing changes    MRI brain 8/2/2021  Impression:        Areas of encephalomalacia and gliosis in the right frontal lobe and medial   aspect the cerebellar hemispheres from previous areas of injury or insult. No acute brain parenchymal abnormality.           Review of Systems:  Constitutional: negative for anorexia, chills, fatigue, fevers, sweats and weight loss  Eyes: negative for redness and visual disturbance  Ears, nose, mouth, throat, and face: negative for earaches, sore throat and tinnitus  Respiratory: negative for cough and shortness of breath  Cardiovascular: negative for chest pain, dyspnea and palpitations  Gastrointestinal: negative for abdominal pain, change in bowel habits, constipation, nausea and vomiting  Genitourinary:negative for dysuria, frequency, hesitancy and urinary incontinence  Integument/breast: negative for pruritus and rash  Musculoskeletal:negative for muscle weakness and stiff joints  Neurological: negative for dizziness, headaches and weakness  Behavioral/Psych: negative for decreased appetite, depression and fatigue    Functional History:  PTA: Independent with all activities. Current:  PT:  Restrictions/Precautions: Weight Bearing  Other position/activity restrictions: Up with assist  Right Lower Extremity Weight Bearing: Weight Bearing As Tolerated  Left Lower Extremity Weight Bearing: Non Weight Bearing       7/31  Bed mobility  Supine to Sit: Contact guard assistance  Sit to Supine: Contact guard assistance  Scooting: Contact guard assistance  Comment: Pt very impulsive , but easily redirected. HOB elavted. Transfers  Comment: Pt reported fatigue after the. ex. requested to return to bed. Balance  Posture: Fair  Sitting - Static: Good;-  Sitting - Dynamic: Good;-  Exercises  Comments:Seated LE exercise program: Long Arc Quads, hip abduction/adduction, heel/toe raises, and marches. Reps: 10x 2sets each       OT:   7/30  ADL  Grooming: Minimal assistance;Verbal cueing;Setup; Increased time to complete (Pt washed face sitting EOB, pt req v/c to bring washcloth to face.)  UE Bathing: Minimal assistance; Increased time to complete;Verbal cueing;Setup (Pt washed BUE, chest sitting EOB, req Min A and hand over hand assistance for pt to cross midline to wash under arms)  LE Bathing: Setup;Verbal cueing; Increased time to complete; Moderate assistance (Pt washed BLE sitting EOB. Pt req multiple v/c to initiate task. Pt would try to wash under arms, req redirection to BLE.)  UE Dressing: Minimal assistance; Increased time to complete;Verbal cueing;Setup (Don/doff gown sitting EOB, req Min A to thread BUE into gown)      ST:    Pt presents with mild-severe cognitive deficits characterized by difficulty with thought organization, immediate and delayed recall, word generation and abstract reasoning. Pt. Presents with no dysarthria, no O/M deficits at this time. Increased processing time noted throughout evaluation. Wife reports pt. Is currently not functioning at baseline. ST to follow up and provide treatment to address noted deficits. Education provided. Past Medical History:        Diagnosis Date    Abnormal thyroid biopsy     s/p left hemithyroidectomy 6/1368 - follicular adenoma    Acid reflux     Anemia     resolved - previously seen by Dr. Karsten Banuelos (due to enlarged spleen)    Anesthesia     seizures with brain surgery due to blood sugar got really high    B[facility] disorder ([facility]     Blood clot in vein 04/07/2016    [facility]) 04/2019    thyroid    Chest pain     previously seeing [facility] cardiologist, now seeing Dr. Penelope Ortega (LAD bridging on cath 4/2016)    Chronic back pain     Chronic bronchitis ([facility]     Dr. Kim Hence Colon polyp 08/30/2016    Depression     seeing Consuelo Russell DVT (deep venous thrombosis) ([facility] [id number]    not on Coumadin due to unable to regulate - Dr. Bianca Burnette - no etiology found per patient    Esophageal abnormality     nodule     Fatty liver disease, nonalcoholic     U/S 88/56[facility]    Furuncle     legs    History of Doppler ultrasound 05/29/2011    No hemodynamically significant carotid stenosis is identified. A thyroid nodule on each side.  Dedicated ultrasound of thyroid gland is suggested to further evaluate if clinically indicated.  History of pulmonary embolism 2007    s/p GFF, related to knee surgery    Hx of blood clots     leg and lung    Hyperlipidemia     severely elevated triglycerides    Hypertension     diastolic    Intracranial arachnoid cyst 11/2012    Dr. Jase Sagastume drained, complicated with seizures, DKA    Irritable bowel syndrome     Liver disease     Bette Cheikh - elevated LFT - positive smooth muscle antibody, steatosis per liver bx 3/2014 with Dr. Pam Low (multiple drug resistant organisms) resistance 2012    MRSA (methicillin resistant staph aureus) culture positive     h/o in foot and before brain surgery    Nephrolithiasis     noted on CT abdomen 9/2016, 6/2019    Neuropathy     Pancreatic insufficiency     Positive SNADY (antinuclear antibody)     Dr. Chauncey Gordon - first visit in 6/2013    Prolonged emergence from general anesthesia     Restless legs syndrome     Seizures ([address]     started with brain surgery    Sinus tachycardia     Holter 3/2013 - seeing Dr. Savage [facility])     clips     Sleep apnea     positive sleep apnea per study 10/2020.     Systolic dysfunction     \"systolic bridge\"  EF 08% ECHO 9/2019    Type II or unspecified type diabetes mellitus without mention of complication, not stated as uncontrolled 2007       Past Surgical History:        Procedure Laterality Date    CARDIAC CATHETERIZATION      2011,5-6 years ago   [address]  3-2012   [address]  04/28/2016    [address] [place] [address]     CARPAL TUNNEL RELEASE  01/2017    CHOLECYSTECTOMY  2004    COLONOSCOPY  2012    COLONOSCOPY  08/2016    2 tubular adenomas - reportedly needs repeat scope in 6 months    CRANIOTOMY  11/2012    arachnoid cyst drainage    EKG 12-LEAD  10/18/2015         ENDOSCOPY, COLON, DIAGNOSTIC      HERNIA REPAIR Right 1996    [facility]--Dr. Dee Baumgarten INGUINAL HERNIA REPAIR Right 09/15/2016    Robotic assisted    KNEE ARTHROSCOPY Right 2016    KNEE SURGERY Left 2007    acl and debrided twice    OTHER SURGICAL HISTORY  5-31-11    Tilt table was associated w/ nonspecific symptoms or nausea, otherwise no significant dizziness or syncope. No significant hemodynamic changes. Otherwise, unremarkable tilt table test after 30 minutes of tilting.  OTHER SURGICAL HISTORY  01/20/2017    RIGHT SHOULDER ARTHROSCOPY, OPEN STAN, OPEN ACROMIOPLASTY, BICEP TENDESIS, RIGHT CARPAL TUNNEL RELEASE    SHOULDER SURGERY Right 01/2007    THYROID LOBECTOMY N/A 1/15/2021    THYROID LOBECTOMY, UVULOPALATOPHARYNGOPLAST LAOP performed by Ezio Rm MD at [facility] ECHOCARDIOGRAM  3-05-11    LV size and systolic function normal. EF 55-65%.  UPPER GASTROINTESTINAL ENDOSCOPY  2012    UPPER GASTROINTESTINAL ENDOSCOPY  2016    Dr. Amirah White Left 10/22/2019    EGD DILATION SAVORY performed by William Lopez MD at [facility] ENDOSCOPY Left 10/22/2019    EGD BIOPSY performed by William Lopez MD at [address]  2007       Allergies:     Allergies   Allergen Reactions    Ciprofloxacin-Ciproflox Hcl Er Other (See Comments)     Elevated creatinine    Heparin      Monitor for thrombocytopenia    Metformin Nausea Only     Abdominal pain, diarrhea    Niacin And Related Other (See Comments)     Burning sensation, severe flushing    Tramadol Hcl      Contraindication to seizure medications    Ultram [Tramadol]      Contraindication to seizure medications    Warfarin      Very difficult to regulate in past        Current Medications:   Current Facility-Administered Medications: doxycycline hyclate (VIBRA-TABS) tablet 100 mg, 100 mg, Oral, 2 times per day  metoprolol tartrate (LOPRESSOR) tablet 25 mg, 25 mg, Oral, BID  LORazepam (ATIVAN) injection 1 mg, 1 mg, Intravenous, Q30 Min PRN  gabapentin (NEURONTIN) capsule 400 mg, 400 mg, Oral, 4x Daily  divalproex (DEPAKOTE) DR tablet 500 mg, 500 mg, Oral, BID  lactulose (CHRONULAC) 10 GM/15ML solution 20 g, 20 g, Oral, TID  insulin glargine (LANTUS) injection vial 47 Units, 47 Units, Subcutaneous, BID  zonisamide (ZONEGRAN) capsule 500 mg, 500 mg, Oral, Nightly  DULoxetine (CYMBALTA) extended release capsule 60 mg, 60 mg, Oral, Daily  pantoprazole (PROTONIX) tablet 40 mg, 40 mg, Oral, BID  levETIRAcetam (KEPPRA) tablet 2,000 mg, 2,000 mg, Oral, BID  rOPINIRole (REQUIP) tablet 1 mg, 1 mg, Oral, TID  insulin lispro (HUMALOG) injection vial 0-12 Units, 0-12 Units, Subcutaneous, TID WC  insulin lispro (HUMALOG) injection vial 0-6 Units, 0-6 Units, Subcutaneous, Nightly  glucose (GLUTOSE) 40 % oral gel 15 g, 15 g, Oral, PRN  dextrose 50 % IV solution, 12.5 g, Intravenous, PRN  glucagon (rDNA) injection 1 mg, 1 mg, Intramuscular, PRN  dextrose 5 % solution, 100 mL/hr, Intravenous, PRN  lacosamide (VIMPAT) tablet 200 mg, 200 mg, Oral, BID  lactobacillus (CULTURELLE) capsule 1 capsule, 1 capsule, Oral, Daily  sodium chloride flush 0.9 % injection 5-40 mL, 5-40 mL, Intravenous, 2 times per day  sodium chloride flush 0.9 % injection 10 mL, 10 mL, Intravenous, PRN  0.9 % sodium chloride infusion, 25 mL, Intravenous, PRN  potassium chloride (KLOR-CON M) extended release tablet 40 mEq, 40 mEq, Oral, PRN **OR** potassium bicarb-citric acid (EFFER-K) effervescent tablet 40 mEq, 40 mEq, Oral, PRN **OR** potassium chloride 10 mEq/100 mL IVPB (Peripheral Line), 10 mEq, Intravenous, PRN  magnesium sulfate 1000 mg in dextrose 5% 100 mL IVPB, 1,000 mg, Intravenous, PRN  ondansetron (ZOFRAN-ODT) disintegrating tablet 4 mg, 4 mg, Oral, Q8H PRN **OR** ondansetron (ZOFRAN) injection 4 mg, 4 mg, Intravenous, Q6H PRN  polyethylene glycol (GLYCOLAX) packet 17 g, 17 g, Oral, Daily PRN  acetaminophen (TYLENOL) tablet 650 mg, 650 mg, Oral, Q6H PRN **OR** acetaminophen (TYLENOL) suppository 650 mg, 650 mg, Rectal, Q6H PRN  enoxaparin (LOVENOX) injection 40 Assistance: Independent  Homemaking Assistance: Independent  Homemaking Responsibilities: Yes  Ambulation Assistance: Independent  Transfer Assistance: Independent  Active : No  Patient's  Info: patient unable to drive due to history of seizures. Wife  or daughters drives  Occupation: On disability  Leisure & Hobbies: Fishing, time with grandchildren    Family History:       Problem Relation Age of Onset    Heart Disease Father 61        MI    Stroke Brother     Obesity Brother     Arthritis Mother     Seizures Mother     Obesity Sister     Other Brother         pulmonary embolism    Diabetes Daughter     Seizures Brother            Physical Exam:    /81   Pulse 64   Temp 98.2 °F (36.8 °C) (Oral)   Resp 14   Ht 6' 2\" (1.88 m)   Wt 250 lb 14.4 oz (113.8 kg)   SpO2 97%   BMI 32.21 kg/m²     General appearance: alert, appears stated age, cooperative, and no distress  Head: Normocephalic, without obvious abnormality, atraumatic  Eyes: conjunctivae clear. Throat: lips, mucosa, and tongue normal.  Neck: no adenopathy and supple, symmetrical, trachea midline. Lungs: clear to auscultation bilaterally. Heart: regular rate and rhythm, no murmur. Abdomen: soft, non-tender; bowel sounds normal.  Extremities: extremities normal, atraumatic, no edema, normal tone. Mental status: Alert, orientedX3- knew yr, pres, location, follows commands, names objects though slow processing,       Sensory: Intact in BUE and BLE to soft and pin sensation. Motor: Muscle tone and bulk are normal bilaterally. No pronator drift. 4/5 UE/LE, LLE with dressing        Coordination: finger to nose normal bilaterally.       Diagnostics:  CBC   Lab Results   Component Value Date    WBC 7.3 08/03/2021    RBC 4.71 08/03/2021    RBC 4.93 03/26/2012    HGB 14.1 08/03/2021    HCT 40.1 08/03/2021    MCV 85.1 08/03/2021    RDW 12.1 08/03/2021     08/03/2021     BMP    Lab Results   Component Value Date     08/03/2021    K 3.6 08/03/2021    K 3.8 07/27/2021     08/03/2021    CO2 21 08/03/2021    BUN 11 08/03/2021     Uric Acid  No components found for: URIC  VITAMIN B12 No components found for: B12  PT/INR  No results found for: PTINR    Radiology:     Impression: Mr. Pierre Bridges is a 46 y.o. male with a history of Seizures ([facility] [address]    1. Toxic metabolic encephalopathy  2. Hyper ammonia-Chronulac  3. GERD-Protonix  4. MRSA cellulitis  5. Seizure-Depakote, Neurontin, Vimpat, Keppra, zonisamide  6. Left heel wound, right leg ulceration-doxycycline, nonweightbearing due to cognitive status, otherwise weightbearing just to forefoot  7. Diabetes-insulin  8. Hypertension-metoprolol  9. Bipolar disorder/depression-Cymbalta  10. History of pulmonary embolism status post Ubly filter per notes    Recommendations:  1. Diagnosis: Toxic metabolic encephalopathy  2. Therapy: Has PT and OT as well as speech needs  3. Medical  Necessity: As above  4. Support: Clarify, spouse  5. Rehab recommendation: Would benefit from acute inpatient rehabilitation when medically ready if able to mnt WBS of LLE and has support on dc  6. DVT proph: Lovenox    It was my pleasure to evaluate Pierre Bridges today. Please call with questions. Naomi Miles. Erica Healy MD          This note is created with the assistance of a speech recognition program.  While intending to generate a document that actually reflects the content of the visit, the document can still have some errors including those of syntax and sound a like substitutions which may escape proof reading.   In such instances, actual meaning can be extrapolated by contextual diversion

## 2021-08-03 NOTE — PROGRESS NOTES
Occupational Therapy  Facility/Department: [facility] 4A STEPDOWN  Daily Treatment Note  NAME: Hao Talavera  : 1970  MRN: [id number]    Date of Service: 8/3/2021    Discharge Recommendations:  Patient would benefit from continued therapy after discharge   Ed on OT services, ADLs, orientation review, EC/WS, DME/AE, NWB prec L LE- good return       Assessment   Performance deficits / Impairments: Decreased functional mobility ; Decreased ADL status; Decreased strength;Decreased endurance;Decreased balance;Decreased high-level IADLs  Prognosis: Good  REQUIRES OT FOLLOW UP: Yes  Activity Tolerance  Activity Tolerance: Patient Tolerated treatment well  Safety Devices  Safety Devices in place: Yes  Type of devices: All fall risk precautions in place; Bed alarm in place;Call light within reach; Left in bed;Nurse notified         Patient Diagnosis(es): There were no encounter diagnoses. has a past medical history of Abnormal thyroid biopsy, Acid reflux, Anemia, Anesthesia, Bipolar disorder ([address] Blood clot in vein, Cancer ([address] Chest pain, Chronic back pain, Chronic bronchitis ([address] Colon polyp, Depression, DVT (deep venous thrombosis) ([address] Esophageal abnormality, Fatty liver disease, nonalcoholic, Furuncle, History of Doppler ultrasound, History of pulmonary embolism, Hx of blood clots, Hyperlipidemia, Hypertension, Intracranial arachnoid cyst, Irritable bowel syndrome, Liver disease, MDRO (multiple drug resistant organisms) resistance, MRSA (methicillin resistant staph aureus) culture positive, Nephrolithiasis, Neuropathy, Pancreatic insufficiency, Positive SANDY (antinuclear antibody), Prolonged emergence from general anesthesia, Restless legs syndrome, Seizures ([address] Sinus tachycardia, Skull fracture ([address] Sleep apnea, Systolic dysfunction, and Type II or unspecified type diabetes mellitus without mention of complication, not stated as uncontrolled.    has a past surgical history that includes knee surgery (Left, 2007); Vena Cava Filter Placement (2007); hernia repair (Right, 1996); Cholecystectomy (2004); Upper gastrointestinal endoscopy (2012); transthoracic echocardiogram (3-05-11); other surgical history (5-31-11); Cardiac catheterization; Cardiac catheterization (3-2012); craniotomy (11/2012); Tonsillectomy; Endoscopy, colon, diagnostic; EKG 12 Lead (10/18/2015); Knee arthroscopy (Right, 2016); Cardiac catheterization (04/28/2016); Upper gastrointestinal endoscopy (2016); Inguinal hernia repair (Right, 09/15/2016); Colonoscopy (2012); Colonoscopy (08/2016); other surgical history (01/20/2017); shoulder surgery (Right, 01/2007); Carpal tunnel release (01/2017); Upper gastrointestinal endoscopy (Left, 10/22/2019); Upper gastrointestinal endoscopy (Left, 10/22/2019); and Thyroid lobectomy (N/A, 1/15/2021). Restrictions  Restrictions/Precautions  Restrictions/Precautions: Weight Bearing  Required Braces or Orthoses?: No  Lower Extremity Weight Bearing Restrictions  Right Lower Extremity Weight Bearing: Weight Bearing As Tolerated  Left Lower Extremity Weight Bearing: Non Weight Bearing  Position Activity Restriction  Other position/activity restrictions: Up with assist  Subjective   General  Chart Reviewed: Yes  Patient assessed for rehabilitation services?: Yes  Family / Caregiver Present: No    Pain Assessment  Pain Level: 8  Pain Type: Acute pain  Pain Location: Foot  Pain Orientation: Left  Pain Descriptors: Aching;Constant  Pain Frequency: Continuous  Response to Pain Intervention: Patient Satisfied (RN had given pt pain meds prior to writer's arrival)  Vital Signs  Patient Currently in Pain: Yes   Orientation  Orientation  Overall Orientation Status: Within Functional Limits  Orientation Level: Oriented to situation;Oriented to person;Oriented to time;Oriented to place  Objective    Pt was in bed sleeping upon arrival.  Pt pleasant and motivated to participate in therapy.   Pt demo bed mob and sat on EOB to complete ADLs. Pt required more assistance w/ ADLs and transfers d/t decreased strength, endurance, and balance. Pt retired to bed at end of session and receptive to ed. ADL  Grooming: Setup; Increased time to complete;Stand by assistance  UE Bathing: Stand by assistance; Increased time to complete  LE Bathing: Stand by assistance; Increased time to complete  UE Dressing: Setup;Minimal assistance  LE Dressing: Stand by assistance; Increased time to complete  Toileting: Maximum assistance; Increased time to complete (brief mgt)  Additional Comments: pt completed ADLs seated on EOB        Balance  Sitting Balance: Stand by assistance  Standing Balance: Moderate assistance (w/RW)  Standing Balance  Time: stood ~ 1 min  Activity: sit<>stand, static standing w/ RW  Comment: verbal cues to follow NWB prec- good return  Bed mobility  Rolling to Left: Stand by assistance  Rolling to Right: Stand by assistance  Supine to Sit: Contact guard assistance (progress UB)  Sit to Supine: Contact guard assistance (progress LB)  Scooting: Minimal assistance (scoot hips forward so feet were flat on the floor)  Transfers  Sit to stand:  Moderate assistance  Stand to sit: Minimal assistance  Transfer Comments: w/RW  Attendance  Participation: Active participation      Plan   Plan  Times per week: 3-5x/wk  Current Treatment Recommendations: Patient/Caregiver Education & Training, Balance Training, Functional Mobility Training, Cognitive Reorientation, Endurance Training, Pain Management, Cognitive/Perceptual Training, Safety Education & Training, Self-Care / ADL    AM-[facility] Score        [facility] Inpatient Daily Activity Raw Score: 16 (08/03/21 1356)  AM-[facility] Inpatient ADL T-Scale Score : 35.96 (08/03/21 1356)  ADL Inpatient CMS 0-100% Score: 53.32 (08/03/21 1356)  ADL Inpatient CMS G-Code Modifier : CK (08/03/21 [facility])    Goals  Short term goals  Time Frame for Short term goals: By discharge, pt will be able to:  Short term goal 1: Demo bed mobility with SBA and use of handrails  Short term goal 2: Demo func transfers with Jarrett and following all WB precautions with less than 2 VCs  Short term goal 3: Demo static standing balance for 5+ minutes with Jarrett and LRD PRN, following all WB precautions, and 1 VC, to increase activity tolerance for functional tasks  Short term goal 4: Demo UB ADLs with SBA and 0 VCs  Short term goal 5: Demo LB ADLs with CGA, AE/DME PRN, and 0 VCs to follow WB precautions  Short term goal 6: Demo 100% accuracy to NWB precautions throughout all func tasks with less than 4 VCs       Therapy Time   Individual Concurrent Group Co-treatment   Time In 0910         Time Out 0955         Minutes 45          time code min: 45 min       [address], CERON/L

## 2021-08-03 NOTE — PLAN OF CARE
Problem: Nutritional:  Goal: Nutritional status will improve  Description: Nutritional status will improve  Outcome: Ongoing     Problem: Physical Regulation:  Goal: Will remain free from infection  Description: Will remain free from infection  Outcome: Ongoing  Goal: Ability to maintain vital signs within normal range will improve  Description: Ability to maintain vital signs within normal range will improve  Outcome: Ongoing  Goal: Signs of adequate cerebral perfusion will increase  Description: Signs of adequate cerebral perfusion will increase  Outcome: Ongoing  Goal: Ability to maintain a stable neurologic state will improve  Description: Ability to maintain a stable neurologic state will improve  Outcome: Ongoing     Problem: Skin Integrity:  Goal: Demonstration of wound healing without infection will improve  Description: Demonstration of wound healing without infection will improve  Outcome: Ongoing  Goal: Will show no infection signs and symptoms  Description: Will show no infection signs and symptoms  Outcome: Ongoing  Goal: Absence of new skin breakdown  Description: Absence of new skin breakdown  Outcome: Ongoing     Problem: Coping:  Goal: Ability to cope will improve  Description: Ability to cope will improve  Outcome: Ongoing  Goal: Ability to identify appropriate support needs will improve  Description: Ability to identify appropriate support needs will improve  Outcome: Ongoing     Problem: Health Behavior:  Goal: Ability to manage health-related needs will improve  Description: Ability to manage health-related needs will improve  Outcome: Ongoing     Problem: Safety:  Goal: Ability to remain free from injury will improve  Description: Ability to remain free from injury will improve  Outcome: Ongoing     Problem: Self-Concept:  Goal: Level of anxiety will decrease  Description: Level of anxiety will decrease  Outcome: Ongoing  Goal: Ability to verbalize feelings about condition will improve  Description: Ability to verbalize feelings about condition will improve  Outcome: Ongoing     Problem: Falls - Risk of:  Goal: Will remain free from falls  Description: Will remain free from falls  Outcome: Ongoing  Goal: Absence of physical injury  Description: Absence of physical injury  Outcome: Ongoing     Problem: Musculor/Skeletal Functional Status  Goal: Highest potential functional level  Outcome: Ongoing     Problem: Pain:  Goal: Pain level will decrease  Description: Pain level will decrease  Outcome: Ongoing  Goal: Control of acute pain  Description: Control of acute pain  Outcome: Ongoing  Goal: Control of chronic pain  Description: Control of chronic pain  Outcome: Ongoing     Problem: Confusion - Acute:  Goal: Absence of continued neurological deterioration signs and symptoms  Description: Absence of continued neurological deterioration signs and symptoms  Outcome: Ongoing  Goal: Mental status will be restored to baseline  Description: Mental status will be restored to baseline  Outcome: Ongoing     Problem: Discharge Planning:  Goal: Ability to perform activities of daily living will improve  Description: Ability to perform activities of daily living will improve  Outcome: Ongoing  Goal: Participates in care planning  Description: Participates in care planning  Outcome: Ongoing     Problem: Injury - Risk of, Physical Injury:  Goal: Will remain free from falls  Description: Will remain free from falls  Outcome: Ongoing  Goal: Absence of physical injury  Description: Absence of physical injury  Outcome: Ongoing     Problem: Mood - Altered:  Goal: Mood stable  Description: Mood stable  Outcome: Ongoing  Goal: Absence of abusive behavior  Description: Absence of abusive behavior  Outcome: Ongoing  Goal: Verbalizations of feeling emotionally comfortable while being cared for will increase  Description: Verbalizations of feeling emotionally comfortable while being cared for will increase  Outcome:

## 2021-08-03 NOTE — PROGRESS NOTES
Speech Language Pathology  Speech Language Pathology  [facility]    Cognitive Treatment Note    Date: 8/3/2021  Patients Name: Susana Ashley  MRN: [id number]  Patient Active Problem List   Diagnosis Code    Depression F32.9    Hypertension I10    Liver disease K76.9    Restless legs syndrome G25.81    Chronic back pain M54.9, G89.29    Other chest pain R07.89    Irritable bowel syndrome K58.9    Non compliance w medication regimen Z91.14    Hypertension associated with diabetes ([facility] E11.59, I15.2    Hyperlipidemia with target LDL less than 100 E78.5    IBS (irritable bowel syndrome) K58.9    Sinus tachycardia R00.0    Confusion R41.0    Encephalopathy G93.40    Diabetes mellitus type II, uncontrolled ([facility]) E11.65    Dizziness R42    Neuropathy G62.9    Hyperammonemia ([facility]) E72.20    Medication overdose, insulin T50.901A    Suicide attempt ([facility]) T14.91XA    Hypoglycemia E16.2    Hypokalemia E87.6    Lithium toxicity T56.891A    Nausea & vomiting R11.2    Snoring R06.83    Hypogonadism [id number]    Erectile dysfunction due to diseases classified elsewhere N52.1    Rash R21    Headaches due to old head injury G44.309, S09.90XS    Abdominal wall pain in left lower quadrant R10.32    Bipolar affective disorder ([facility] F31.9    Hypogonadism in male E29.1    Type 2 diabetes mellitus with diabetic neuropathy ([facility]) E11.40    Hypomagnesemia E83.42    Partial seizure ([facility] R56.9    Syncope and collapse R55    Headache disorder R51.9    Partial symptomatic epilepsy with complex partial seizures, not intractable, without status epilepticus ([facility]) G40.209    Respiratory acidosis E87.2    Essential hypertension I10    Gastritis K29.70    Gastroesophageal reflux disease K21.9    Mixed hyperlipidemia E78.2    Acute lateral meniscus tear of right knee S83.281A    Recurrent right inguinal hernia K40.91    Bicipital tendinitis of right shoulder M75.21    Carpal tunnel syndrome of right wrist G56.01    Obesity (BMI 30-39. 9) E66.9    Lump of axilla R22.30    Familial hypercholesterolemia E78.01    Coronary artery disease of native artery of native heart with stable angina pectoris ([facility]) I25.118    Exertional dyspnea R06.00    Vitamin D deficiency E55.9    Seizure ([facility]) R56.9    Nausea and vomiting R11.2    Type II diabetes mellitus with manifestations, uncontrolled ([facility]) E11.8, E11.65    Chondromalacia of knee, right M94.261    Effusion of left knee M25.462    Elevated levels of transaminase & lactic acid dehydrogenase R74.01, R74.02    Other intervertebral disc degeneration, lumbar region M51.36    Palpitations R00.2    Splenomegaly R16.1    Sprain of right foot S93.601A    Steatohepatitis K75.81    Osteopenia M85.80    Obstructive sleep apnea I18.58    Follicular neoplasm of thyroid D49.7    Nasal obstruction J34.89    Nasal septal deviation J34.2    Chest pain R07.9    MISTI (obstructive sleep apnea) G47.33    Acute post-operative pain G89.18    Bipolar 1 disorder, depressed ([facility]) F31.9    Status post uvulopalatopharyngoplasty Z98.890    Status post partial thyroidectomy E89.0    Wound infection T14. 8XXA, L08.9    Diabetic leg ulcer ([address] E11.622, L97.909    Seizures ([address] R56.9    Partial symptomatic epilepsy with complex partial seizures, intractable, without status epilepticus ([address] G40.219    Episodic confusion R41.0    Acute metabolic encephalopathy S37.58       Pain: 0/10    Cognitive Treatment    Treatment time:  4815-2224      Subjective: [x] Alert [x] Cooperative     [] Confused     [] Agitated    [] Lethargic      Objective/Assessment:    Recall: Immediate recall of 4 numbers:  3/8 increased to 4/8 with multiple repetitions. Pt. Demonstrated difficulty with repetition.    Memory and mental manipulation, size 3-4 words:  1/5 increased to 3/5 with multiple repetitions and min verbal cues    Organization: Naming the category:  3/8 increased to 8/8 with min-mod verbal cues and choice of 2     Problem Solving/Reasoning: Stating situational problems:  20% increased to 70% with min-mod verbal cues               Multiple uses for objects:  60% increased to 80% with min verbal cues. Other: Increased processing time noted throughout treatment. Pt. Provided with education regarding memory compensatory strategies. Pt. Encouraged to utilize strategies once discharged from the hospital. Pt. Verbalized understanding. Tip sheep left at bedside. Plan:  [x] Continue ST services    [] Discharge from :      Discharge recommendations: [] Inpatient Rehab   [] [place]   [] Outpatient Therapy  [] Follow up at trauma clinic   [x] Other:  Further therapy recommended at discharge. Treatment completed by: Filomena Solis, SLP, M.A.  JOHN-SLP

## 2021-08-03 NOTE — PROGRESS NOTES
[facility]  Office: [address], DO, Rolf [place], DO, Yasemin Balint, DO, Andrew Light Blood, DO, Renee Reece MD, Corine Wilson MD, Radha Jordan MD, Anderson Meraz MD, Ladonna Jones MD, Fartun Lockwood MD, Perri Pierce MD, Lamonte Cooks, DO, Ame Dahl MD, Lucretia Olea DO, Jerri Harkins MD,  Jennifer Jansen, DO, Kkoo Mike MD, Lindsay Sandoval MD, Jayla Winn MD, Anamaria Elias MD, Jazlyn Correa MD, Najma Hurtado MD, Primo Min MD, [facility]ank [facility], [facility], CNP, Leah Acosta, CNP, Jose Purdy, CNS, Alondra Henao, Shani Dennis, CNP, Jenna Strange, CNP, Aaron Skelton, CNP, Shira Stockton, CNP, Sugar Saini PA-C, Rachele Tovar, [facility], Parish Rome, CNP, Lilia Mosqueda, CNP, hCuck Robbins, CNP, Vicenta Kc, CNP, Drea Tapia, CNP, Yung Chadwick, CNP, Paras Jauregui, CNP, [facility], [address] [place] [address]    Progress Note    8/3/2021    2:22 PM    Name:   aHo Talavera  MRN:     [id number]     [place]:      [de-identified]   Room:   [address] [place] [address]  IP Day:  7  Admit Date:  7/27/2021  3:42 PM    PCP:   Meg Jimenez MD  Code Status:  Full Code    Subjective:     C/C: Seizures and altered mentation  Interval History Status: improved. Patient noted at bedside, oriented x3  Does not complain of any chest pain, fever. Complains of leg pain  Off LTME  Found to have elevated ammonia level again today  Did not take lactulose overnight    Brief History: \"This is a 44-year-old male who was transferred here from [facility] for LTM Breanna Harder initially presented to the hospital for treatment of right lower extremity cellulitis. Primo Luu was evaluated by podiatry and started on vancomycin and Zosyn and was transition to Vancomycin after cultures positive for MRSA. It was recommended he be discharged from OSH with bactrim.  Otherwise, currently being worked up for altered mental status. chloride, potassium chloride **OR** potassium alternative oral replacement **OR** potassium chloride, magnesium sulfate, ondansetron **OR** ondansetron, polyethylene glycol, acetaminophen **OR** acetaminophen    Data:     Past Medical History:   has a past medical history of Abnormal thyroid biopsy, Acid reflux, Anemia, Anesthesia, Bipolar disorder ([facility] Blood clot in vein, Cancer ([facility] Chest pain, Chronic back pain, Chronic bronchitis ([facility] Colon polyp, Depression, DVT (deep venous thrombosis) ([facility] Esophageal abnormality, Fatty liver disease, nonalcoholic, Furuncle, History of Doppler ultrasound, History of pulmonary embolism, Hx of blood clots, Hyperlipidemia, Hypertension, Intracranial arachnoid cyst, Irritable bowel syndrome, Liver disease, MDRO (multiple drug resistant organisms) resistance, MRSA (methicillin resistant staph aureus) culture positive, Nephrolithiasis, Neuropathy, Pancreatic insufficiency, Positive SANDY (antinuclear antibody), Prolonged emergence from general anesthesia, Restless legs syndrome, Seizures ([facility] Sinus tachycardia, Skull fracture ([facility] Sleep apnea, Systolic dysfunction, and Type II or unspecified type diabetes mellitus without mention of complication, not stated as uncontrolled. Social History:   reports that he has never smoked. He has never used smokeless tobacco. He reports that he does not drink alcohol and does not use drugs.      Family History:   Family History   Problem Relation Age of Onset    Heart Disease Father 61        MI    Stroke Brother     Obesity Brother     Arthritis Mother     Seizures Mother     Obesity Sister     Other Brother         pulmonary embolism    Diabetes Daughter     Seizures Brother        Vitals:  /79   Pulse 66   Temp 98.2 °F (36.8 °C) (Oral)   Resp 17   Ht 6' 2\" (1.88 m)   Wt 250 lb 14.4 oz (113.8 kg)   SpO2 97%   BMI 32.21 kg/m²   Temp (24hrs), Av.4 °F (36.9 °C), Min:98.1 °F (36.7 °C), Max:98.6 °F (37 °C)    Recent Labs     08/02/21  1602 08/02/21  2109 08/03/21  0655 08/03/21  1146   POCGLU 174* 148* 232* 159*       I/O (24Hr): Intake/Output Summary (Last 24 hours) at 8/3/2021 1422  Last data filed at 8/3/2021 0530  Gross per 24 hour   Intake 1310 ml   Output 1950 ml   Net -640 ml       Labs:  Hematology:  Recent Labs     08/01/21  0537 08/02/21  0608 08/03/21  0651   WBC 7.6 7.9 7.3   RBC 4.71 4.74 4.71   HGB 14.1 14.1 14.1   HCT 40.3* 40.7 40.1*   MCV 85.6 85.9 85.1   MCH 29.9 29.7 29.9   MCHC 35.0* 34.6 35.2*   RDW 11.9 12.0 12.1    320 228   MPV 10.4 10.7 9.9     Chemistry:  Recent Labs     08/01/21  0537 08/02/21  0608 08/03/21  0651    138 141   K 3.4* 4.0 3.6*   * 107 108*   CO2 20 20 21   GLUCOSE 142* 165* 236*   BUN 6 10 11   CREATININE 0.56* 0.60* 0.70   ANIONGAP 13 11 12   LABGLOM >60 >60 >60   GFRAA >60 >60 >60   CALCIUM 8.7 8.4* 8.5*     Recent Labs     08/02/21  0731 08/02/21  1131 08/02/21  1602 08/02/21 2109 08/03/21  0655 08/03/21  0814 08/03/21  1146   AMMONIA  --   --   --   --   --  102*  --    POCGLU 235* 137* 174* 148* 232*  --  159*     ABG:  Lab Results   Component Value Date    PH 7.40 06/26/2020    PH 5.5 01/04/2012    PCO2 40 06/26/2020    PO2 71 06/26/2020    HCO3 25 06/26/2020    O2SAT 94 06/26/2020     No results found for: SPECIAL  No results found for: CULTURE    Radiology:  MRI BRAIN WO CONTRAST    Result Date: 8/2/2021  Areas of encephalomalacia and gliosis in the right frontal lobe and medial aspect the cerebellar hemispheres from previous areas of injury or insult. No acute brain parenchymal abnormality.        Physical Examination:        General appearance:  alert, cooperative and no distress  Mental Status:  Slow in response but oriented to person, place and time and normal affect  Lungs:  clear to auscultation bilaterally, normal effort  Heart:  regular rate and rhythm, no murmur  Abdomen:  soft, nontender, nondistended, normal bowel sounds, no MD  8/3/2021  2:22 PM

## 2021-08-03 NOTE — CARE COORDINATION
[facility] Quality Flow/Interdisciplinary Rounds Progress Note    Quality Flow Rounds held on August 3, 2021 at [address] [facility] Attending:  Charge RN, Bedside RN, RN Supervisor, CM    Barriers to Discharge: Placement    Anticipated Discharge Date:  Expected Discharge Date: 08/02/21    Anticipated Discharge Disposition:    Readmission Risk              Risk of Unplanned Readmission:  19           Discussed patient goal for the day, patient clinical progression, and barriers to discharge. The following Goal(s) of the Day/Commitment(s) have been identified:      Received call from [address] [place] [address] and their physician feels pt more of a SNF candidate than ARU candidate  Called Cornel Herrmann, pt's wife to see if their was a second choice and she is on her way here. We will speak then    3514 Faxed PM&R note over to [facility] and left VM to Linda to see if their MD would reconsider with the PM&R eval.    8600 Spoke with wife and her second choice is inpt rehab at [address] [facility] called there [phone] main number and got transferred to ARU. Given fax number [phone] and faxed H&P, face sheet, PM&R consult, PT/OT notes attn Tanisha Howard for referral.  I also called admissions at [address] [place] [address] and asked if he could come to their TCU.    36 Spoke with Linda from [address] [place] [address] and she will have her MD review PM&R consult and will have an answer tomorrow since he has pts all day. She also states they no longer have a TCU    1500 Received call from [facility]. They  cannot accept.    Chauncey Gordon, JEWEL  August 3, 2021

## 2021-08-03 NOTE — PROGRESS NOTES
Neurology Nurse Practitioner Progress Note      INTERVAL HISTORY: This is a 46 y.o.  male admitted 7/27/2021 for LTME. This is a follow-up neurology progress note. The patient was examined and the chart was reviewed. Discussed with the pt & RN. Pt stays alert, was oriented; today is the 1st time that he knew the name of hospital; better responses; denied any new symptoms. No more seizures reported. HPI: Deep Meng is a 46 y.o. male with H/O R frontal subarachnoid cyst s/p R craniectomy (11/2012), intractable seizures post-craniectomy, L hemithyroidectomy d/t follicular adenoma, HTN, HLD, DM with neuropathy, DVT/PE s/p vena cava filter placement (2007), bipolar disorder, chronic back pain, non-alcoholic fatty liver, MDRO, who was admitted as a transfer from [facility] on 7/27/2021 for LTME. As per medical records, patient was initially admitted at [facility] on 07/20/2021 with right lower extremity cellulitis and left heel wound; he was found to have MRSA and was started on antibiotics. During his stay, he developed encephalopathy. Patient was evaluated by the neurology team there. EEG showed right sided slowing with intermittent sharp waves that may be epileptogenic in nature, there was also excessive slowing in the theta and delta range. Family reported that patient had slept poorly over the last few days and had been increasingly disoriented. On 7/25, he was given a dose of Ativan and loading dose of Depakote 500 mg IVPB x1. Next morning patient was more alert, however that afternoon wife reported that he was confused like he usually is during his seizures. Wife had requested transfer to [facility] for LTME however that got delayed. Eventually patient was transferred to [facility] on 07/27/2021 for LTME to rule out subclinical seizures; neurology was consulted.     Patient developed seizures after he underwent right craniectomy for the resection of right frontal subarachnoid cyst in 11/2012. He follows up with neurologist in [place] and has been on 4 different AEDs due to intractable epilepsy.  doxycycline hyclate  100 mg Oral 2 times per day    metoprolol tartrate  25 mg Oral BID    gabapentin  400 mg Oral 4x Daily    divalproex  500 mg Oral BID    lactulose  20 g Oral TID    insulin glargine  47 Units Subcutaneous BID    zonisamide  500 mg Oral Nightly    DULoxetine  60 mg Oral Daily    pantoprazole  40 mg Oral BID    levETIRAcetam  2,000 mg Oral BID    rOPINIRole  1 mg Oral TID    insulin lispro  0-12 Units Subcutaneous TID WC    insulin lispro  0-6 Units Subcutaneous Nightly    lacosamide  200 mg Oral BID    lactobacillus  1 capsule Oral Daily    sodium chloride flush  5-40 mL Intravenous 2 times per day    enoxaparin  40 mg Subcutaneous Daily       Past Medical History:   Diagnosis Date    Abnormal thyroid biopsy     s/p left hemithyroidectomy 5/9912 - follicular adenoma    Acid reflux     Anemia     resolved - previously seen by Dr. Ct Rodgers (due to enlarged spleen)    Anesthesia     seizures with brain surgery due to blood sugar got really high    B[facility] disorder ([facility] [address]     Blood clot in vein 04/07/2016    [facility]     Cancer [facility]) 04/2019    thyroid    Chest pain     previously seeing [facility] cardiologist, now seeing Dr. Liz Denson (LAD bridging on cath 4/2016)    Chronic back pain     Chronic bronchitis ([facility] [address]     Dr. Pauline Nichols    Colon polyp 08/30/2016    Depression     seeing Taz Caputo DVT (deep venous thrombosis) ([facility] [address] [id number]    not on Coumadin due to unable to regulate - Dr. Joan Galloway - no etiology found per patient    Esophageal abnormality     nodule     Fatty liver disease, nonalcoholic     U/S 83/3567 [facility]    Furuncle     legs    History of Doppler ultrasound 05/29/2011    No hemodynamically significant carotid stenosis is identified. A thyroid nodule on each side.  Dedicated ultrasound of thyroid gland is suggested to further evaluate if clinically indicated.  History of pulmonary embolism 2007    s/p GFF, related to knee surgery    Hx of blood clots     leg and lung    Hyperlipidemia     severely elevated triglycerides    Hypertension     diastolic    Intracranial arachnoid cyst 11/2012    Dr. Aline Carlos drained, complicated with seizures, DKA    Irritable bowel syndrome     Liver disease     Monica Can - elevated LFT - positive smooth muscle antibody, steatosis per liver bx 3/2014 with Dr. Reed Pickard (multiple drug resistant organisms) resistance 2012    MRSA (methicillin resistant staph aureus) culture positive     h/o in foot and before brain surgery    Nephrolithiasis     noted on CT abdomen 9/2016, 6/2019    Neuropathy     Pancreatic insufficiency     Positive SANDY (antinuclear antibody)     Dr. Naila Dudley - first visit in 6/2013    Prolonged emergence from general anesthesia     Restless legs syndrome     Seizures ([address]     started with brain surgery    Sinus tachycardia     Holter 3/2013 - seeing Dr. Xochitl Mcdowell fracture [facility])     clips     Sleep apnea     positive sleep apnea per study 10/2020.     Systolic dysfunction     \"systolic bridge\"  EF 84% ECHO 9/2019    Type II or unspecified type diabetes mellitus without mention of complication, not stated as uncontrolled 2007       Past Surgical History:   Procedure Laterality Date    CARDIAC CATHETERIZATION      2011,5-6 years ago   [address]  3-2012   [address]  04/28/2016    [facility]     CARPAL TUNNEL RELEASE  01/2017    CHOLECYSTECTOMY  2004    COLONOSCOPY  2012    COLONOSCOPY  08/2016    2 tubular adenomas - reportedly needs repeat scope in 6 months    CRANIOTOMY  11/2012    arachnoid cyst drainage    EKG 12-LEAD  10/18/2015         ENDOSCOPY, COLON, DIAGNOSTIC      HERNIA REPAIR Right 1996    [facility]--Dr. Rachid Gore INGUINAL HERNIA REPAIR Right 09/15/2016    Robotic assisted    KNEE ARTHROSCOPY Right 2016    KNEE SURGERY Left 2007    acl and debrided twice    OTHER SURGICAL HISTORY  5-31-11    Tilt table was associated w/ nonspecific symptoms or nausea, otherwise no significant dizziness or syncope. No significant hemodynamic changes. Otherwise, unremarkable tilt table test after 30 minutes of tilting.  OTHER SURGICAL HISTORY  01/20/2017    RIGHT SHOULDER ARTHROSCOPY, OPEN STAN, OPEN ACROMIOPLASTY, BICEP TENDESIS, RIGHT CARPAL TUNNEL RELEASE    SHOULDER SURGERY Right 01/2007    THYROID LOBECTOMY N/A 1/15/2021    THYROID LOBECTOMY, UVULOPALATOPHARYNGOPLAST LAOP performed by Martinez Sellers MD at [address] ECHOCARDIOGRAM  3-05-11    LV size and systolic function normal. EF 55-65%.  UPPER GASTROINTESTINAL ENDOSCOPY  2012    UPPER GASTROINTESTINAL ENDOSCOPY  2016    Dr. Laura Price Left 10/22/2019    EGD DILATION SAVORY performed by Apolonia Pratt MD at [address] [facility] ENDOSCOPY Left 10/22/2019    EGD BIOPSY performed by Apolonia Pratt MD at [address]  2007       PHYSICAL EXAM:      Blood pressure 121/77, pulse 63, temperature 98.6 °F (37 °C), temperature source Oral, resp. rate 14, height 6' 2\" (1.88 m), weight 250 lb 14.4 oz (113.8 kg), SpO2 98 %.       Neurological Examination:  Mental status   Pt stays alert, was oriented; today is the 1st time that he knew the name of hospital; better responses; followed commands   Cranial nerves   II - visual fields intact to confrontation; pupils reactive  III, IV, VI - extraocular muscles intact; no KARIS; no nystagmus; no ptosis   V - normal facial sensation                                                               VII - normal facial symmetry                                                             VIII - intact hearing                                                                             IX, X - symmetrical palate elevation XI - symmetrical shoulder shrug                                                       XII - midline tongue without atrophy or fasciculation   Motor function  Strength: Able to raise all limbs antigravity, UEs>LEs  Normal bulk and tone                  Sensory function Diminished sensation in stocking distribution     Cerebellar No visible tremors   Reflex function Ankle reflexes - absent; patellar reflexes 1/4  UEs 2/4 symmetric    Gait                  Not tested       DATA      Lab Results   Component Value Date    WBC 7.3 08/03/2021    HGB 14.1 08/03/2021    HCT 40.1 (L) 08/03/2021     08/03/2021    ALT 46 (H) 07/29/2021    AST 22 07/29/2021     08/03/2021    K 3.6 (L) 08/03/2021     (H) 08/03/2021    AMMONIA 102 (H) 08/03/2021    CREATININE 0.70 08/03/2021    BUN 11 08/03/2021    CO2 21 08/03/2021    TSH 1.72 07/28/2021    PSA 0.27 06/28/2021    INR 1.08 01/23/2021    [id number] 768 04/21/2021    FOLATE 12.2 04/21/2021    GLUF 283 (H) 10/17/2015    LABA1C 8.3 (H) 07/20/2021    LABMICR < 1.20 12/29/2020     Lab Results   Component Value Date    CHOL 147 12/29/2020    CHOL 133 12/26/2019    CHOL 166 10/06/2017     Lab Results   Component Value Date    TRIG 246 (H) 04/06/2021    TRIG 860 (H) 12/29/2020    TRIG 334 (H) 12/26/2019     Lab Results   Component Value Date    HDL 26 12/29/2020    HDL 29 12/26/2019    HDL 32 10/06/2017     Lab Results   Component Value Date    LDLCALC SEE BELOW 12/29/2020    LDLCALC 37 12/26/2019    LDLCALC 64 10/06/2017      7/25/2021 09:43 7/26/2021 05:33 7/27/2021 18:11 7/29/2021 11:58   Lacosamide  9.9 8.9    Levetiracetam  38 34    Valproic Acid Lvl 42.8 (L)  44 (L) 66   Valproic Acid, Free   3.9 (L) 6.9 (L)   Valproic acid % free   8.9 10.5   Zonisamide   13         7/27/2021 18:11 7/30/2021 05:44 7/30/2021 08:51 8/3/2021 08:14   Ammonia 109 (H) 142 (HH) 53 102 (H)        7/31/2021 07:58   CRP <3.0   ESR 4 DIAGNOSTIC DATA:  MRI BRAIN (8/2/2021):   Areas of encephalomalacia & gliosis in R frontal lobe & medial aspect of    cerebellar hemispheres from previous areas of injury or insult. LTME (7/28 - 7/30/2021): Severely abnormal record with evidence of bihemispheric dysfunction no active seizures recorded but clear-cut ictal propensity recording consistent with resolved status head dominant focus appears to be residual left frontal area                        IMPRESSION: 46 y.o.  male admitted with  Toxic metabolic encephalopathy in the setting of hyperammonemia (109) & RLE MRSA cellulitis; patient is on doxycycline (till 8/12/21) & lactulose    Pt stays alert, was oriented; today is the 1st time that he knew the name of hospital; better responses; denied any new c/o    Patient was transferred to [facility] for LTME to r/o subclinical seizures; H/O intractable seizure disorder post-R craniectomy (11/2012) d/t R frontal subarachnoid cyst resection; LTME - bi-hemispheric dysfunction with no epileptiform discharges. No clinical seizures reported    Diabetic neuropathy; is on Neurontin 400 mg QID    RLS; on Requip 1 mg TID    Comorbid conditions - L hemithyroidectomy due to follicular adenoma (8/53/63), HTN, HLD, DVT/PE s/p vena cava filter placement (2007), bipolar disorder, chronic back pain, IBS, non-alcoholic fatty liver, MDRO, nephrolithiasis, pancreatic insufficiency, MISTI, depression            PLAN:  NH3 53 -> 102 (8/3). Will decrease the dose of Depakote DR as 250 + 500 mg; continue Vimpat 200 mg BID, Keppra 2 g BID PO & Zonegran 500 mg HS    Continue PT/OT    Patient F/U with his neurologist in [place]    Will follow    Please note that this note was generated using a voice recognition dictation software. Although every effort was made to ensure the accuracy of this automated transcription, some errors in transcription may have occurred.

## 2021-08-03 NOTE — PROGRESS NOTES
[facility] - Progress Note  Today's Date and Time: 8/3/2021, 9:43 AM    Impression :   · MRSA right leg ulcerations  · Left heel wound  · Breakthrough seizure with history of epilepsy  · Diabetes mellitus type 2  · Essential hypertension  · Hyperammonemia  · Altered mentation    Recommendations:   · D/C Vancomycin   · Wound care  · Doxycycline 100 mg po BID x 10 days. Stop date 8-12-21      Medical Decision Making/Summary/Discussion:8/3/2021     ·   Infection Control Recommendations   · [place] Precautions  · Contact Isolation     Antimicrobial Stewardship Recommendations     · Simplification of therapy  · Targeted therapy    Coordination of Outpatient Care:   · Estimated Length of IV antimicrobials: TBD  · Patient will need Midline Catheter Insertion: No  · Patient will need PICC line Insertion:  · Patient will need: Home IV , [place],  SNF,  LTAC:TBD  · Patient will need outpatient wound care:Yes    Chief complaint/reason for consultation:   · Cellulitis, foot abscess growing MRSA      History of Present Illness:   Hallie Carter is a 46y.o.-year-old male who was initially admitted on 7/27/2021. Patient seen at the request of Dr. Jimbo Ramirez:    Initially, this patient with a history of complex seizure disorder and diabetes mellitus, presented to [facility] on 7/20/2021 with 2 ulcerations on his right leg and one on his left heel. He states they had first appeared approximately 3 weeks prior, but have gotten worse with foul-smelling discharge and severe pain. Wound cultures of the right leg yielded MRSA. While at [facility], the patient underwent an I&D of the left heel wound per Podiatry and was treated for the MRSA positive right leg wounds with Vanco  per ID physician, Dr. Keyona Wilson. One out of 2 blood cultures was positive for coag negative staph but this was considered as a contaminant.     The patient was doing well, but management still working on transferring patient. Left heel warm to touch, slight drainage negative malodor, no edema     Right anterior leg lesions without drainage, no edema    On evaluation, the patient is drowsy, follows commands  EEG completed 07/28/21, showed abnormal bihemispheric dysfunction, no active seizures. 07/30/21 Ammonia 142 --> 53  On lactulose and decreased dosages of Depakote      His liver profile from the 20th was normal.    We will continue to follow    Labs, X rays reviewed: 8/3/2021    BUN:8 --> 6 --> 6-->10->11  Cr: 0.70->0.64->0.56->0.60->9.7  CRP: 3.46     WBC: 5.9->7.4->7.4->7.6->7.9->7.3  Hb:14.8->12.4->14.0->14.1->14.1->14.1  Plat: 253->228->263->320->228    Cultures:  Urine:  ·   Blood:  ·   Sputum :  ·   Wound:  · 7/20/2021: MRSA    Images:    7/27/21 7/27/2021    Discussed with patient, RN, family. I have personally reviewed the past medical history, past surgical history, medications, social history, and family history, and I have updated the database accordingly.   Past Medical History:     Past Medical History:   Diagnosis Date    Abnormal thyroid biopsy     s/p left hemithyroidectomy 7/6032 - follicular adenoma    Acid reflux     Anemia     resolved - previously seen by Dr. Cedrick Garcia (due to enlarged spleen)    Anesthesia     seizures with brain surgery due to blood sugar got really high    Bipolar disorder ([facility]     Blood clot in vein 04/07/2016    [facility]     Cancer [facility]) 04/2019    thyroid    Chest pain     previously seeing [place] cardiologist, now seeing Dr. Ramila Griffith (LAD bridging on cath 4/2016)    Chronic back pain     Chronic bronchitis ([facility]     Dr. Manuel Austin Colon polyp 08/30/2016    Depression     seeing Magaly Delarosa DVT (deep venous thrombosis) ([facility] [phone]    not on Coumadin due to unable to regulate - Dr. Zofia Torrez - no etiology found per patient    Esophageal abnormality     nodule     Fatty liver disease, nonalcoholic     U/S 74/[address] [facility]    Furuncle     legs    History of Doppler ultrasound 05/29/2011    No hemodynamically significant carotid stenosis is identified. A thyroid nodule on each side. Dedicated ultrasound of thyroid gland is suggested to further evaluate if clinically indicated.  History of pulmonary embolism 2007    s/p GFF, related to knee surgery    Hx of blood clots     leg and lung    Hyperlipidemia     severely elevated triglycerides    Hypertension     diastolic    Intracranial arachnoid cyst 11/2012    Dr. Sylvia Estrada drained, complicated with seizures, DKA    Irritable bowel syndrome     Liver disease     Andrew Hernandez - elevated LFT - positive smooth muscle antibody, steatosis per liver bx 3/2014 with Dr. Chirag Felix (multiple drug resistant organisms) resistance 2012    MRSA (methicillin resistant staph aureus) culture positive     h/o in foot and before brain surgery    Nephrolithiasis     noted on CT abdomen 9/2016, 6/2019    Neuropathy     Pancreatic insufficiency     Positive SANDY (antinuclear antibody)     Dr. Eloy Ewing - first visit in 6/2013    Prolonged emergence from general anesthesia     Restless legs syndrome     Seizures ([address]     started with brain surgery    Sinus tachycardia     Holter 3/2013 - seeing Dr. Jami Zheng fracture [facility])     clips     Sleep apnea     positive sleep apnea per study 10/2020.     Systolic dysfunction     \"systolic bridge\"  EF 75% ECHO 9/2019    Type II or unspecified type diabetes mellitus without mention of complication, not stated as uncontrolled 2007       Past Surgical  History:     Past Surgical History:   Procedure Laterality Date    CARDIAC CATHETERIZATION      2011,5-6 years ago   [place]  3-2012   [place]  04/28/2016    [facility]     CARPAL TUNNEL RELEASE  01/2017    CHOLECYSTECTOMY  2004    COLONOSCOPY  2012    COLONOSCOPY  08/2016    2 tubular adenomas - reportedly needs repeat scope in 6 months    CRANIOTOMY 11/2012    arachnoid cyst drainage    EKG 12-LEAD  10/18/2015         ENDOSCOPY, COLON, DIAGNOSTIC      HERNIA REPAIR Right 1996    [facility]--Dr. Courtney Archuleta    INGUINAL HERNIA REPAIR Right 09/15/2016    Robotic assisted    KNEE ARTHROSCOPY Right 2016    KNEE SURGERY Left 2007    acl and debrided twice    OTHER SURGICAL HISTORY  5-31-11    Tilt table was associated w/ nonspecific symptoms or nausea, otherwise no significant dizziness or syncope. No significant hemodynamic changes. Otherwise, unremarkable tilt table test after 30 minutes of tilting.  OTHER SURGICAL HISTORY  01/20/2017    RIGHT SHOULDER ARTHROSCOPY, OPEN STAN, OPEN ACROMIOPLASTY, BICEP TENDESIS, RIGHT CARPAL TUNNEL RELEASE    SHOULDER SURGERY Right 01/2007    THYROID LOBECTOMY N/A 1/15/2021    THYROID LOBECTOMY, UVULOPALATOPHARYNGOPLAST LAOP performed by Jory Oropeza MD at [facility] ECHOCARDIOGRAM  3-05-11    LV size and systolic function normal. EF 55-65%.      UPPER GASTROINTESTINAL ENDOSCOPY  2012    UPPER GASTROINTESTINAL ENDOSCOPY  2016    Dr. Mitra Saavedra Left 10/22/2019    EGD DILATION SAVORY performed by Doron Malik MD at [facility] ENDOSCOPY Left 10/22/2019    EGD BIOPSY performed by Doron Malik MD at [address]  2007       Medications:      doxycycline hyclate  100 mg Oral 2 times per day    metoprolol tartrate  25 mg Oral BID    gabapentin  400 mg Oral 4x Daily    divalproex  500 mg Oral BID    lactulose  20 g Oral TID    insulin glargine  47 Units Subcutaneous BID    zonisamide  500 mg Oral Nightly    DULoxetine  60 mg Oral Daily    pantoprazole  40 mg Oral BID    levETIRAcetam  2,000 mg Oral BID    rOPINIRole  1 mg Oral TID    insulin lispro  0-12 Units Subcutaneous TID WC    insulin lispro  0-6 Units Subcutaneous Nightly    lacosamide  200 mg Oral BID    lactobacillus  1 capsule Oral Daily    sodium chloride flush  5-40 mL Intravenous 2 times per day    enoxaparin  40 mg Subcutaneous Daily       Social History:     Social History     Socioeconomic History    Marital status:      Spouse name: Not on file    Number of children: 3    Years of education: Not on file    Highest education level: Not on file   Occupational History    Occupation: Disability since 2011   Tobacco Use    Smoking status: Never Smoker    Smokeless tobacco: Never Used   Vaping Use    Vaping Use: Never used   Substance and Sexual Activity    Alcohol use: No     Alcohol/week: 0.0 standard drinks    Drug use: No    Sexual activity: Not on file   Other Topics Concern    Not on file   Social History Narrative    Not on file     Social Determinants of Health     Financial Resource Strain: Medium Risk    Difficulty of Paying Living Expenses: Somewhat hard   Food Insecurity: No Food Insecurity    Worried About Running Out of Food in the Last Year: Never true    Jeffry of Food in the Last Year: Never true   Transportation Needs:     Lack of Transportation (Medical):      Lack of Transportation (Non-Medical):    Physical Activity:     Days of Exercise per Week:     Minutes of Exercise per Session:    Stress:     Feeling of Stress :    Social Connections:     Frequency of Communication with Friends and Family:     Frequency of Social Gatherings with Friends and Family:     Attends Oriental orthodox Services:     Active Member of Clubs or Organizations:     Attends Club or Organization Meetings:     Marital Status:    Intimate Partner Violence:     Fear of Current or Ex-Partner:     Emotionally Abused:     Physically Abused:     Sexually Abused:        Family History:     Family History   Problem Relation Age of Onset    Heart Disease Father 61        MI    Stroke Brother     Obesity Brother     Arthritis Mother     Seizures Mother     Obesity Sister     Other Brother         pulmonary embolism    Diabetes Daughter     Seizures Brother         Allergies:   Ciprofloxacin-ciproflox hcl er, Heparin, Metformin, Niacin and related, Tramadol hcl, Ultram [tramadol], and Warfarin     Review of Systems:   Unable to assess as patient is nonverbal at this time    Constitutional: No fevers or chills. No systemic complaints  Head: No headaches  Eyes: No double vision or blurry vision. No conjunctival inflammation. ENT: No sore throat or runny nose. . No hearing loss, tinnitus or vertigo. Cardiovascular: No chest pain or palpitations. No shortness of breath. No EWING  Lung: No shortness of breath or cough. No sputum production  Abdomen: No nausea, vomiting, diarrhea, or abdominal pain. Mayte Sofi No cramps. Genitourinary: No increased urinary frequency, or dysuria. No hematuria. No suprapubic or CVA pain  Musculoskeletal: No muscle aches or pains. No joint effusions, swelling or deformities  Hematologic: No bleeding or bruising. Neurologic: No headache, weakness, numbness, or tingling. Integument: No rash, no ulcers. Psychiatric: No depression. Endocrine: No polyuria, no polydipsia, no polyphagia. Physical Examination :     Patient Vitals for the past 8 hrs:   BP Temp Temp src Pulse Resp SpO2 Weight   08/03/21 0900 -- -- -- 64 14 96 % --   08/03/21 0800 -- -- -- 64 15 97 % --   08/03/21 0753 121/77 98.6 °F (37 °C) Oral 65 15 96 % --   08/03/21 0700 -- -- -- 65 19 96 % --   08/03/21 0600 -- -- -- 62 12 94 % --   08/03/21 0400 116/81 98.2 °F (36.8 °C) Oral 64 14 97 % --   08/03/21 0228 -- -- -- -- -- -- 250 lb 14.4 oz (113.8 kg)     General Appearance: Awake, alert, and in no apparent distress-nonverbal  Head:  Normocephalic, no trauma  Eyes: Pupils equal, round, reactive to light and accommodation; extraocular movements intact; sclera anicteric; conjunctivae pink. No embolic phenomena. ENT: Oropharynx clear, without erythema, exudate, or thrush. No tenderness of sinuses.  Mouth/throat: mucosa pink and moist. No lesions. Dentition in good repair. Neck:Supple, without lymphadenopathy. Thyroid normal, No bruits. Pulmonary/Chest: Clear to auscultation, without wheezes, rales, or rhonchi. No dullness to percussion. Cardiovascular: Regular rate and rhythm without murmurs, rubs, or gallops. Abdomen: Soft, non tender. Bowel sounds normal. No organomegaly  All four Extremities: No cyanosis, clubbing, edema, or effusions. Neurologic: No gross sensory or motor deficits. Skin: 2 healing ulcerations on the right lower leg and 1 left foot wound status post I&D sutures clean dry intact  Medical Decision Making -Laboratory:   I have independently reviewed/ordered the following labs:    CBC with Differential:   Recent Labs     08/02/21  0608 08/03/21  0651   WBC 7.9 7.3   HGB 14.1 14.1   HCT 40.7 40.1*    228   LYMPHOPCT 28 30   MONOPCT 7 8     BMP:   Recent Labs     08/02/21  0608 08/03/21  0651    141   K 4.0 3.6*    108*   CO2 20 21   BUN 10 11   CREATININE 0.60* 0.70     Hepatic Function Panel:   No results for input(s): PROT, LABALBU, BILIDIR, IBILI, BILITOT, ALKPHOS, ALT, AST in the last 72 hours. No results for input(s): RPR in the last 72 hours. No results for input(s): HIV in the last 72 hours. No results for input(s): BC in the last 72 hours. Lab Results   Component Value Date    MUCUS Present 10/17/2015    PH 7.40 06/26/2020    PH 5.5 01/04/2012    RBC 4.71 08/03/2021    RBC 4.93 03/26/2012    WBC 7.3 08/03/2021    YEAST NONE SEEN 08/12/2019     Lab Results   Component Value Date    CREATININE 0.70 08/03/2021    GLUCOSE 236 08/03/2021    GLUCOSE 189 11/12/2015       Medical Decision Making-Imaging:   XR FOOT LEFT (MIN 3 VIEWS)     Result Date: 7/20/2021  PROCEDURE: XR FOOT LEFT (MIN 3 VIEWS) CLINICAL INFORMATION: pain COMPARISON: No prior study. TECHNIQUE: 4 views of the foot were obtained. FINDINGS: No acute fracture or dislocation is seen. There is no soft tissue swelling.  There is a moderate-sized plantar calcaneal spur.      Plantar calcaneal spur. Otherwise normal left foot. **This report has been created using voice recognition software. It may contain minor errors which are inherent in voice recognition technology. ** Final report electronically signed by Dr. Mayberry Both on 7/20/2021 3:57 PM     CT TIBIA FIBULA LEFT WO CONTRAST     Result Date: 7/20/2021  PROCEDURE: NONCONTRAST CT LEFT TIBIA/FIBULA: CLINICAL INFORMATION: Infection/ abscess TECHNIQUE: Multiple axial 3 mm images of the left tibia/fibula were obtained without administration of intravenous contrast material. Computer generated sagittal and coronal images of the left tibia/fibula were also reconstructed. ALL CT SCANS AT THIS FACILITY use dose modulation, iterative reconstruction, and/or weight-based dosing when appropriate to reduce radiation dose to as low as reasonably achievable. FINDINGS: There are bony defects in the distal femur and proximal tibia conjunction with prior anterior crucial ligament repair. No acute fracture is seen. Appears be a small bone cyst in the lateral malleolus. There is mild degenerative spurring of the distal femur. Note that there appear to be multiple superficial varicosities in the distal left calf      1. Degenerative changes of the left knee. Postsurgical changes left knee. Multiple varicosities this left calf. 2. Study otherwise unremarkable. No tibial or fibular fracture is seen. **This report has been created using voice recognition software. It may contain minor errors which are inherent in voice recognition technology. ** Final report electronically signed by Dr. Mayberry Both on 7/20/2021 9:07 PM    8/1/21 brain MRI  Area of encephalomalacia and gliosis in the right frontal lobe and medial aspect the cerebellar hemispheres from previous areas of injury or insult.  No acute brain parenchymal abnormality.        Medical Decision Oiijaq-Njmvmssi-[id number]:     7/23/2021  2:48 PM - Andrea, Robin Hair Incoming Lab Results From Soft    Specimen Information: Heel        Component Collected Lab   Anaerobic Culture 07/20/2021  6:15  [facility]   Culture yielded heavy mixed growth which included anaerobic gram negative bacilli and anaerobic gram positive cocci. If a true mixed aerobic and anaerobic infection is suspected, then broad spectrum empiric antibiotic therapy is indicated and should include coverage for anaerobic organisms. Gram Stain Result 07/20/2021  6:15  [facility]   Few segmented neutrophils observed. Rare epithelial cells observed. Many gram positive cocci occurring singly and in pairs. Organism Abnormal  07/20/2021  6:15  [facility]   Staphylococcus aureus    Aerobic Culture 07/20/2021  6:15  [facility]   light growth This is a MRSA (Methicillin Resistant Staphylococcus aureus)isolate. Isolates of MRSA (ORSA) Methicillin (Oxacillin) Resistant Staphylococcus aureus (coagulase positive) require patient be placed in CONTACT isolation. Methicillin(Oxacillin)resistant strains of staphylococci (MRSA)or(MRSE)should be considered resistant to all classes of cephalosporins, penems and beta-lactams. In the treatment of gram positive infections, GENTAMICIN should be CONSIDERED a SYNERGYSTIC agent ONLY. Ciprofloxacin and Levofloxacin, regardless of in vitro sensitivity, should not be used for staphylococcal infections other than uncomplicated lower UTIs. Testing Performed By    [address] Name Director Address Valid Date Range   835-MR - 11069 Ryder Garcia MD [address] [place] [id number] 08/30/17 0855-Present   Narrative  Performed by: [facility]  Source: heel       Site: swab + tissue left          Current Antibiotics: Vancomycin   Susceptibility    Staphylococcus aureus (1)    Antibiotic Interpretation JODI Status   gentamicin Sensitive <=0.5 mcg/mL Final   ICR (D test) Negative Neg  mcg/mL Final    (Dtest) ICR- inducible clinda resistance    If +, then inducible erm gene       present. Clindamycin may be       effective in some patients.       oxacillin Resistant >=4 mcg/mL Final   clindamycin Sensitive <=0.25 mcg/mL Final   trimethoprim-sulfamethoxazole Sensitive <=10 mcg/mL Final   vancomycin Sensitive <=0.5 mcg/mL Final   tetracycline Sensitive <=1 mcg/mL Final   Lab and Collection    Culture, Anaerobic and Aerobic - 7/20/2021  Result History    Culture, Anaerobic and Aerobic on 7/23/2021 - Result Edited     MRI 8/1/21  Impression   Areas of encephalomalacia and gliosis in the right frontal lobe and medial   aspect the cerebellar hemispheres from previous areas of injury or insult. No acute brain parenchymal abnormality.           Medical Decision Making-Other:     Note:  · Labs, medications, radiologic studies were reviewed with personal review of films  · Large amounts of data were reviewed  · Discussed with nursing Staff, Discharge planner  · Infection Control and Prevention measures reviewed  · All prior entries were reviewed  · Administer medications as ordered  · Prognosis: Fair  · Discharge planning reviewed    Thank you for allowing us to participate in the care of this patient. Please call with questions.     Electronically signed by Keeley Garzon DPM on 8/3/2021 at 9:43 AM

## 2021-08-03 NOTE — PROGRESS NOTES
Comprehensive Nutrition Assessment    Type and Reason for Visit:  Initial (LOS)    Nutrition Recommendations/Plan: Modify current diet to provide 5 Carb Choices with each meal.  Provide 1 Glucerna oral supplement per day. Encourage/monitor PO intakes as tolerated. Will monitor labs, weights, and plan of care. Nutrition Assessment:  Pt seen based on length of stay. Admitted as a transfer from [address] [place] [address] with a change in mentation/AMS. Pt initially admitted for foot/left heel pain with an abscess. PMHx includes: DM, HTN, IBS. Pt s/p I&D of left heel wound. Pt also has a diabetic wound to his left heel and right leg present. Noted improved mentation. RN reports yesterday pt ate 100% of breakfast and lunch trays. Wife then brought in dinner for pt which he also ate well. Pt did not eat much for breakfast today. Appetite is variable. Pt also reports having nausea at times. Last recorded BM 8/2. Labs reviewed: K 3.6 mmol/L, Gluose 159-236 mg/dL, Ammonia 102 umol/L. Meds reviewed: Lantus, Humalog SS, Probiotics. Malnutrition Assessment:  Malnutrition Status: At risk for malnutrition    Context:  Acute Illness     Findings of the 6 clinical characteristics of malnutrition:  Energy Intake:  1 - 75% or less of estimated energy requirements for 7 or more days - Variable PO intakes during admission. Weight Loss:  No significant weight loss     Body Fat Loss:  No significant body fat loss   Muscle Mass Loss:  No significant muscle mass loss  Fluid Accumulation:  1 - Mild Extremities   Strength:  Not Performed    Estimated Daily Nutrient Needs:  Energy (kcal):  MSJ x 1.1-1.2 = 1637-8036 kcal/day; Weight Used for Energy Requirements:  Current   Protein (g):  1.5 gm/kg = 130 gm pro/day; Weight Used for Protein Requirements:  Ideal        Fluid (ml/day):  per MD    Nutrition Related Findings:  Labs/Meds reviewed. Last BM 8/2.       Wounds:  Diabetic Ulcer (to left heel and right leg)       Current Nutrition Therapies:    ADULT DIET; Regular; 4 carb choices (60 gm/meal)    Anthropometric Measures:  · Height: 6' 2\" (188 cm)  · Current Body Weight: 250 lb 14.4 oz (113.8 kg)   · Admission Body Weight: 254 lb 3.2 oz (115.3 kg)    · Usual Body Weight: 245 lb (111.1 kg) (per pt report)     · Ideal Body Weight: 190 lbs; % Ideal Body Weight 132.1 %   · BMI: 32.2  · BMI Categories: Obese Class 1 (BMI 30.0-34. 9)       Nutrition Diagnosis:   · Inadequate oral intake related to  (current medical condition, mentation) as evidenced by  (variable PO intakes; need for ONS)    · Increased nutrient needs related to  (healing) as evidenced by wounds    Nutrition Interventions:   Food and/or Nutrient Delivery:  Modify Current Diet, Start Oral Nutrition Supplement (Modify diet to provide 5 carb choices with each meal.  Provide 1 Glucerna oral supplement per day.)  Nutrition Education/Counseling:  No recommendation at this time   Coordination of Nutrition Care:  Continue to monitor while inpatient    Goals:  Oral intakes to meet % of estimated nutrition needs.        Nutrition Monitoring and Evaluation:   Food/Nutrient Intake Outcomes:  Food and Nutrient Intake, Supplement Intake  Physical Signs/Symptoms Outcomes:  Biochemical Data, GI Status, Nausea or Vomiting, Fluid Status or Edema, Hemodynamic Status, Nutrition Focused Physical Findings, Skin, Weight     Electronically signed by Liam Moctezuma RD, UBALDO on 8/3/21 at 3:02 PM EDT    Contact: [phone]

## 2021-08-03 NOTE — PLAN OF CARE
Nutrition Problem #1: Inadequate oral intake  Intervention: Food and/or Nutrient Delivery: Modify Current Diet, Start Oral Nutrition Supplement (Modify diet to provide 5 carb choices with each meal.  Provide 1 Glucerna oral supplement per day.)  Nutritional Goals: Oral intakes to meet % of estimated nutrition needs.

## 2021-08-03 NOTE — PROGRESS NOTES
or unspecified type diabetes mellitus without mention of complication, not stated as uncontrolled. has a past surgical history that includes knee surgery (Left, 2007); Vena Cava Filter Placement (2007); hernia repair (Right, 1996); Cholecystectomy (2004); Upper gastrointestinal endoscopy (2012); transthoracic echocardiogram (3-05-11); other surgical history (5-31-11); Cardiac catheterization; Cardiac catheterization (3-2012); craniotomy (11/2012); Tonsillectomy; Endoscopy, colon, diagnostic; EKG 12 Lead (10/18/2015); Knee arthroscopy (Right, 2016); Cardiac catheterization (04/28/2016); Upper gastrointestinal endoscopy (2016); Inguinal hernia repair (Right, 09/15/2016); Colonoscopy (2012); Colonoscopy (08/2016); other surgical history (01/20/2017); shoulder surgery (Right, 01/2007); Carpal tunnel release (01/2017); Upper gastrointestinal endoscopy (Left, 10/22/2019); Upper gastrointestinal endoscopy (Left, 10/22/2019); and Thyroid lobectomy (N/A, 1/15/2021). Restrictions  Restrictions/Precautions  Restrictions/Precautions: Weight Bearing  Required Braces or Orthoses?: No  Lower Extremity Weight Bearing Restrictions  Right Lower Extremity Weight Bearing: Weight Bearing As Tolerated  Left Lower Extremity Weight Bearing: Non Weight Bearing  Position Activity Restriction  Other position/activity restrictions: Up with assist  Subjective   General  Chart Reviewed: Yes  Response To Previous Treatment: Patient with no complaints from previous session. Family / Caregiver Present: Yes (wife)  Subjective  Subjective: RN and pt agreeable to PT, pt supine in bed, wife at bedside, pleasant and cooperative throughout treatment. General Comment  Comments: retired to chair with chair alarm set.   Pain Screening  Patient Currently in Pain: No  Pain Assessment  Pain Assessment: 0-10  Pain Level: 0  Vital Signs  Patient Currently in Pain: No       Orientation     Cognition   Cognition  Overall Cognitive Status: Exceptions  Arousal/Alertness: Delayed responses to stimuli  Following Commands: Follows one step commands with repetition; Follows one step commands with increased time  Attention Span: Difficulty attending to directions  Memory: Decreased recall of precautions  Safety Judgement: Decreased awareness of need for assistance;Decreased awareness of need for safety  Problem Solving: Assistance required to identify errors made;Assistance required to correct errors made;Decreased awareness of errors  Insights: Decreased awareness of deficits  Initiation: Requires cues for some  Sequencing: Requires cues for some  Objective   Bed mobility  Supine to Sit: Contact guard assistance  Scooting: Contact guard assistance  Comment: HOB elevated, and bed rails utalized. Transfers  Sit to Stand: Minimal Assistance (pt unable to follow WB precautions, dt cognitive defeciet)  Stand to sit: Minimal Assistance (pt unable to follow WB precautions, dt cognitive defeciet)  Stand Pivot Transfers: Maximum Assistance (pt unable to follow WB precautions, dt cognitive defeciet)  Comment: Pt transfer to chair max, stand pivot, d/t inability to follow wb precautions  Ambulation  Ambulation?: No  Stairs/Curb  Stairs?: No        Exercises  Comments: Seated LE exercise program: Long Arc Quads, hip abduction/adduction, heel/toe raises, and marches.  Reps: 10 reps x2, with demos for correct pacing and full ROM       AM-PAC Score  AM-PAC Inpatient Mobility Raw Score : 13 (08/03/21 1536)  AM-PAC Inpatient T-Scale Score : 36.74 (08/03/21 1536)  Mobility Inpatient CMS 0-100% Score: 64.91 (08/03/21 1536)  Mobility Inpatient CMS G-Code Modifier : CL (08/03/21 1536)    Goals  Short term goals  Time Frame for Short term goals: 10  visits  Short term goal 1: transfers with SBA  Short term goal 2: amb 25 ft with a RW x CCGA with NWB L LE WBAT R LE  Short term goal 3: to be independent with bed mobility  Short term goal 4: 20 min exercise program x SBA  Patient Goals   Patient goals : Return home    Plan    Plan  Times per week: 5-6x wk  Times per day: Daily  Current Treatment Recommendations: Strengthening, Functional Mobility Training, Transfer Training, Gait Training, Endurance Training, Pain Management, Safety Education & Training  Safety Devices  Type of devices: Nurse notified, Call light within reach, Chair alarm in place, Gait belt  Restraints  Initially in place: No     Therapy Time   Individual Concurrent Group Co-treatment   Time In 1338         Time Out 1417         Minutes 39         Timed Code Treatment Minutes: [address] [facility], [facility]

## 2021-08-04 ENCOUNTER — TELEPHONE (OUTPATIENT)
Dept: NEUROLOGY | Age: 51
End: 2021-08-04

## 2021-08-04 LAB
ABSOLUTE EOS #: 0.17 K/UL (ref 0–0.44)
ABSOLUTE IMMATURE GRANULOCYTE: 0.1 K/UL (ref 0–0.3)
ABSOLUTE LYMPH #: 2.19 K/UL (ref 1.1–3.7)
ABSOLUTE MONO #: 0.55 K/UL (ref 0.1–1.2)
AMMONIA: 70 UMOL/L (ref 16–60)
ANION GAP SERPL CALCULATED.3IONS-SCNC: 12 MMOL/L (ref 9–17)
BASOPHILS # BLD: 1 % (ref 0–2)
BASOPHILS ABSOLUTE: 0.04 K/UL (ref 0–0.2)
BUN BLDV-MCNC: 11 MG/DL (ref 6–20)
BUN/CREAT BLD: ABNORMAL (ref 9–20)
CALCIUM SERPL-MCNC: 8.3 MG/DL (ref 8.6–10.4)
CHLORIDE BLD-SCNC: 107 MMOL/L (ref 98–107)
CO2: 20 MMOL/L (ref 20–31)
CREAT SERPL-MCNC: 0.7 MG/DL (ref 0.7–1.2)
DIFFERENTIAL TYPE: ABNORMAL
EOSINOPHILS RELATIVE PERCENT: 3 % (ref 1–4)
GFR AFRICAN AMERICAN: >60 ML/MIN
GFR NON-AFRICAN AMERICAN: >60 ML/MIN
GFR SERPL CREATININE-BSD FRML MDRD: ABNORMAL ML/MIN/{1.73_M2}
GFR SERPL CREATININE-BSD FRML MDRD: ABNORMAL ML/MIN/{1.73_M2}
GLUCOSE BLD-MCNC: 169 MG/DL (ref 75–110)
GLUCOSE BLD-MCNC: 176 MG/DL (ref 75–110)
GLUCOSE BLD-MCNC: 187 MG/DL (ref 75–110)
GLUCOSE BLD-MCNC: 234 MG/DL (ref 75–110)
GLUCOSE BLD-MCNC: 294 MG/DL (ref 70–99)
GLUCOSE BLD-MCNC: 322 MG/DL (ref 75–110)
HCT VFR BLD CALC: 39.4 % (ref 40.7–50.3)
HEMOGLOBIN: 13.3 G/DL (ref 13–17)
IMMATURE GRANULOCYTES: 2 %
LYMPHOCYTES # BLD: 34 % (ref 24–43)
MCH RBC QN AUTO: 29.6 PG (ref 25.2–33.5)
MCHC RBC AUTO-ENTMCNC: 33.8 G/DL (ref 28.4–34.8)
MCV RBC AUTO: 87.8 FL (ref 82.6–102.9)
MONOCYTES # BLD: 9 % (ref 3–12)
NRBC AUTOMATED: 0 PER 100 WBC
PDW BLD-RTO: 12.1 % (ref 11.8–14.4)
PLATELET # BLD: 176 K/UL (ref 138–453)
PLATELET ESTIMATE: ABNORMAL
PMV BLD AUTO: 10.2 FL (ref 8.1–13.5)
POTASSIUM SERPL-SCNC: 4 MMOL/L (ref 3.7–5.3)
RBC # BLD: 4.49 M/UL (ref 4.21–5.77)
RBC # BLD: ABNORMAL 10*6/UL
SEG NEUTROPHILS: 51 % (ref 36–65)
SEGMENTED NEUTROPHILS ABSOLUTE COUNT: 3.35 K/UL (ref 1.5–8.1)
SODIUM BLD-SCNC: 139 MMOL/L (ref 135–144)
WBC # BLD: 6.4 K/UL (ref 3.5–11.3)
WBC # BLD: ABNORMAL 10*3/UL

## 2021-08-04 PROCEDURE — 2580000003 HC RX 258: Performed by: CLINICAL NURSE SPECIALIST

## 2021-08-04 PROCEDURE — 82947 ASSAY GLUCOSE BLOOD QUANT: CPT

## 2021-08-04 PROCEDURE — 94761 N-INVAS EAR/PLS OXIMETRY MLT: CPT

## 2021-08-04 PROCEDURE — 2060000000 HC ICU INTERMEDIATE R&B

## 2021-08-04 PROCEDURE — 6360000002 HC RX W HCPCS: Performed by: STUDENT IN AN ORGANIZED HEALTH CARE EDUCATION/TRAINING PROGRAM

## 2021-08-04 PROCEDURE — 80048 BASIC METABOLIC PNL TOTAL CA: CPT

## 2021-08-04 PROCEDURE — 97116 GAIT TRAINING THERAPY: CPT

## 2021-08-04 PROCEDURE — 99232 SBSQ HOSP IP/OBS MODERATE 35: CPT | Performed by: INTERNAL MEDICINE

## 2021-08-04 PROCEDURE — 6370000000 HC RX 637 (ALT 250 FOR IP): Performed by: STUDENT IN AN ORGANIZED HEALTH CARE EDUCATION/TRAINING PROGRAM

## 2021-08-04 PROCEDURE — 36415 COLL VENOUS BLD VENIPUNCTURE: CPT

## 2021-08-04 PROCEDURE — 6370000000 HC RX 637 (ALT 250 FOR IP): Performed by: NURSE PRACTITIONER

## 2021-08-04 PROCEDURE — 82140 ASSAY OF AMMONIA: CPT

## 2021-08-04 PROCEDURE — 6370000000 HC RX 637 (ALT 250 FOR IP): Performed by: INTERNAL MEDICINE

## 2021-08-04 PROCEDURE — 99232 SBSQ HOSP IP/OBS MODERATE 35: CPT | Performed by: STUDENT IN AN ORGANIZED HEALTH CARE EDUCATION/TRAINING PROGRAM

## 2021-08-04 PROCEDURE — 6370000000 HC RX 637 (ALT 250 FOR IP): Performed by: CLINICAL NURSE SPECIALIST

## 2021-08-04 PROCEDURE — 99232 SBSQ HOSP IP/OBS MODERATE 35: CPT | Performed by: NURSE PRACTITIONER

## 2021-08-04 PROCEDURE — 99232 SBSQ HOSP IP/OBS MODERATE 35: CPT | Performed by: PHYSICAL MEDICINE & REHABILITATION

## 2021-08-04 PROCEDURE — 97110 THERAPEUTIC EXERCISES: CPT

## 2021-08-04 PROCEDURE — 6370000000 HC RX 637 (ALT 250 FOR IP): Performed by: FAMILY MEDICINE

## 2021-08-04 PROCEDURE — 97530 THERAPEUTIC ACTIVITIES: CPT

## 2021-08-04 PROCEDURE — 97535 SELF CARE MNGMENT TRAINING: CPT

## 2021-08-04 PROCEDURE — 92523 SPEECH SOUND LANG COMPREHEN: CPT

## 2021-08-04 PROCEDURE — 85025 COMPLETE CBC W/AUTO DIFF WBC: CPT

## 2021-08-04 RX ORDER — DULOXETIN HYDROCHLORIDE 60 MG/1
60 CAPSULE, DELAYED RELEASE ORAL DAILY
Qty: 30 CAPSULE | Refills: 3 | Status: SHIPPED | OUTPATIENT
Start: 2021-08-05 | End: 2021-10-27

## 2021-08-04 RX ORDER — ZONISAMIDE 100 MG/1
500 CAPSULE ORAL NIGHTLY
Qty: 30 CAPSULE | Refills: 3 | Status: SHIPPED | OUTPATIENT
Start: 2021-08-04 | End: 2022-05-16

## 2021-08-04 RX ORDER — INSULIN GLARGINE 100 [IU]/ML
50 INJECTION, SOLUTION SUBCUTANEOUS 2 TIMES DAILY
Qty: 5 PEN | Refills: 3 | Status: SHIPPED | OUTPATIENT
Start: 2021-08-04 | End: 2021-08-05 | Stop reason: HOSPADM

## 2021-08-04 RX ORDER — DIVALPROEX SODIUM 500 MG/1
500 TABLET, DELAYED RELEASE ORAL NIGHTLY
Qty: 90 TABLET | Refills: 3 | Status: SHIPPED | OUTPATIENT
Start: 2021-08-04 | End: 2021-10-27 | Stop reason: DRUGHIGH

## 2021-08-04 RX ORDER — DOXYCYCLINE HYCLATE 100 MG
100 TABLET ORAL EVERY 12 HOURS SCHEDULED
Qty: 20 TABLET | Refills: 0 | Status: SHIPPED | OUTPATIENT
Start: 2021-08-04 | End: 2021-08-05

## 2021-08-04 RX ORDER — LEVETIRACETAM 1000 MG/1
2000 TABLET ORAL 2 TIMES DAILY
Qty: 60 TABLET | Refills: 3 | Status: SHIPPED | OUTPATIENT
Start: 2021-08-04

## 2021-08-04 RX ORDER — DIVALPROEX SODIUM 250 MG/1
250 TABLET, DELAYED RELEASE ORAL DAILY
Qty: 90 TABLET | Refills: 3 | Status: SHIPPED | OUTPATIENT
Start: 2021-08-05 | End: 2021-10-27

## 2021-08-04 RX ORDER — LACTULOSE 10 G/15ML
10 SOLUTION ORAL 3 TIMES DAILY
Qty: 2 BOTTLE | Refills: 1 | Status: SHIPPED | OUTPATIENT
Start: 2021-08-04 | End: 2022-01-13

## 2021-08-04 RX ORDER — GABAPENTIN 400 MG/1
400 CAPSULE ORAL 4 TIMES DAILY
Qty: 120 CAPSULE | Refills: 0 | Status: SHIPPED | OUTPATIENT
Start: 2021-08-04 | End: 2021-08-23 | Stop reason: DRUGHIGH

## 2021-08-04 RX ADMIN — ZONISAMIDE 500 MG: 100 CAPSULE ORAL at 20:07

## 2021-08-04 RX ADMIN — LACTULOSE 10 G: 20 SOLUTION ORAL at 20:07

## 2021-08-04 RX ADMIN — INSULIN GLARGINE 47 UNITS: 100 INJECTION, SOLUTION SUBCUTANEOUS at 22:35

## 2021-08-04 RX ADMIN — PANTOPRAZOLE SODIUM 40 MG: 40 TABLET, DELAYED RELEASE ORAL at 20:07

## 2021-08-04 RX ADMIN — DOXYCYCLINE HYCLATE 100 MG: 100 TABLET, COATED ORAL at 20:08

## 2021-08-04 RX ADMIN — ENOXAPARIN SODIUM 30 MG: 30 INJECTION SUBCUTANEOUS at 20:07

## 2021-08-04 RX ADMIN — LACTULOSE 10 G: 20 SOLUTION ORAL at 08:01

## 2021-08-04 RX ADMIN — PANTOPRAZOLE SODIUM 40 MG: 40 TABLET, DELAYED RELEASE ORAL at 08:01

## 2021-08-04 RX ADMIN — DULOXETINE HYDROCHLORIDE 60 MG: 30 CAPSULE, DELAYED RELEASE ORAL at 08:01

## 2021-08-04 RX ADMIN — ROPINIROLE HYDROCHLORIDE 1 MG: 1 TABLET, FILM COATED ORAL at 08:00

## 2021-08-04 RX ADMIN — LACTULOSE 10 G: 20 SOLUTION ORAL at 13:10

## 2021-08-04 RX ADMIN — INSULIN LISPRO 1 UNITS: 100 INJECTION, SOLUTION INTRAVENOUS; SUBCUTANEOUS at 20:13

## 2021-08-04 RX ADMIN — SODIUM CHLORIDE, PRESERVATIVE FREE 10 ML: 5 INJECTION INTRAVENOUS at 20:24

## 2021-08-04 RX ADMIN — INSULIN GLARGINE 47 UNITS: 100 INJECTION, SOLUTION SUBCUTANEOUS at 08:02

## 2021-08-04 RX ADMIN — DIVALPROEX SODIUM 250 MG: 250 TABLET, DELAYED RELEASE ORAL at 08:03

## 2021-08-04 RX ADMIN — LACOSAMIDE 200 MG: 100 TABLET, FILM COATED ORAL at 08:00

## 2021-08-04 RX ADMIN — DOXYCYCLINE HYCLATE 100 MG: 100 TABLET, COATED ORAL at 07:59

## 2021-08-04 RX ADMIN — INSULIN LISPRO 4 UNITS: 100 INJECTION, SOLUTION INTRAVENOUS; SUBCUTANEOUS at 16:35

## 2021-08-04 RX ADMIN — GABAPENTIN 400 MG: 400 CAPSULE ORAL at 08:00

## 2021-08-04 RX ADMIN — LACOSAMIDE 200 MG: 100 TABLET, FILM COATED ORAL at 20:08

## 2021-08-04 RX ADMIN — METOPROLOL TARTRATE 25 MG: 25 TABLET ORAL at 20:07

## 2021-08-04 RX ADMIN — ROPINIROLE HYDROCHLORIDE 1 MG: 1 TABLET, FILM COATED ORAL at 20:07

## 2021-08-04 RX ADMIN — INSULIN LISPRO 8 UNITS: 100 INJECTION, SOLUTION INTRAVENOUS; SUBCUTANEOUS at 07:55

## 2021-08-04 RX ADMIN — GABAPENTIN 400 MG: 400 CAPSULE ORAL at 13:10

## 2021-08-04 RX ADMIN — DIVALPROEX SODIUM 500 MG: 500 TABLET, DELAYED RELEASE ORAL at 20:08

## 2021-08-04 RX ADMIN — INSULIN LISPRO 3 UNITS: 100 INJECTION, SOLUTION INTRAVENOUS; SUBCUTANEOUS at 16:40

## 2021-08-04 RX ADMIN — METOPROLOL TARTRATE 25 MG: 25 TABLET ORAL at 08:00

## 2021-08-04 RX ADMIN — LEVETIRACETAM 2000 MG: 500 TABLET, FILM COATED ORAL at 08:00

## 2021-08-04 RX ADMIN — LEVETIRACETAM 2000 MG: 500 TABLET, FILM COATED ORAL at 20:07

## 2021-08-04 RX ADMIN — GABAPENTIN 400 MG: 400 CAPSULE ORAL at 20:08

## 2021-08-04 RX ADMIN — SODIUM CHLORIDE, PRESERVATIVE FREE 10 ML: 5 INJECTION INTRAVENOUS at 09:28

## 2021-08-04 RX ADMIN — GABAPENTIN 400 MG: 400 CAPSULE ORAL at 16:35

## 2021-08-04 RX ADMIN — Medication 1 CAPSULE: at 08:00

## 2021-08-04 RX ADMIN — INSULIN LISPRO 2 UNITS: 100 INJECTION, SOLUTION INTRAVENOUS; SUBCUTANEOUS at 11:48

## 2021-08-04 RX ADMIN — ENOXAPARIN SODIUM 30 MG: 30 INJECTION SUBCUTANEOUS at 08:01

## 2021-08-04 RX ADMIN — ROPINIROLE HYDROCHLORIDE 1 MG: 1 TABLET, FILM COATED ORAL at 13:10

## 2021-08-04 ASSESSMENT — PAIN DESCRIPTION - PAIN TYPE
TYPE: ACUTE PAIN
TYPE: ACUTE PAIN;CHRONIC PAIN

## 2021-08-04 ASSESSMENT — PAIN SCALES - GENERAL
PAINLEVEL_OUTOF10: 6
PAINLEVEL_OUTOF10: 0
PAINLEVEL_OUTOF10: 5
PAINLEVEL_OUTOF10: 2
PAINLEVEL_OUTOF10: 0
PAINLEVEL_OUTOF10: 6

## 2021-08-04 ASSESSMENT — PAIN DESCRIPTION - ORIENTATION
ORIENTATION: LEFT

## 2021-08-04 ASSESSMENT — PAIN DESCRIPTION - LOCATION
LOCATION: FOOT
LOCATION: HEAD

## 2021-08-04 ASSESSMENT — PAIN DESCRIPTION - DESCRIPTORS: DESCRIPTORS: SHARP

## 2021-08-04 NOTE — PROGRESS NOTES
[facility] - Progress Note  Today's Date and Time: 8/4/2021, 10:01 AM    Impression :   · MRSA right leg ulcerations  · Left heel wound  · Breakthrough seizure with history of epilepsy  · Diabetes mellitus type 2  · Essential hypertension  · Hyperammonemia  · Altered mentation    Recommendations:   · D/C Vancomycin   · Wound care  · Doxycycline 100 mg po BID x 10 days. Stop date 8-12-21      Medical Decision Making/Summary/Discussion:8/4/2021     ·   Infection Control Recommendations   · [place] Precautions  · Contact Isolation     Antimicrobial Stewardship Recommendations     · Simplification of therapy  · Targeted therapy    Coordination of Outpatient Care:   · Estimated Length of IV antimicrobials: TBD  · Patient will need Midline Catheter Insertion: No  · Patient will need PICC line Insertion:  · Patient will need: Home IV , [place],  SNF,  LTAC:TBD  · Patient will need outpatient wound care:Yes    Chief complaint/reason for consultation:   · Cellulitis, foot abscess growing MRSA      History of Present Illness:   Tayler Lindsey is a 46y.o.-year-old male who was initially admitted on 7/27/2021. Patient seen at the request of Dr. Justo Finn:    Initially, this patient with a history of complex seizure disorder and diabetes mellitus, presented to [facility] on 7/20/2021 with 2 ulcerations on his right leg and one on his left heel. He states they had first appeared approximately 3 weeks prior, but have gotten worse with foul-smelling discharge and severe pain. Wound cultures of the right leg yielded MRSA. While at [facility], the patient underwent an I&D of the left heel wound per Podiatry and was treated for the MRSA positive right leg wounds with Vanco  per ID physician, Dr. Jayde Mosqueda. One out of 2 blood cultures was positive for coag negative staph but this was considered as a contaminant.     The patient was doing well, but experienced a change in mentation and an EEG showed abnormal changes. He was going to be transferred to [place], where he follows up with his neurologist, but they had no beds available. He was then transferred to [address] [place] [address] for further neurology work-up. Per Podiatry at [facility] on 7/20/21   Vascular: Dorsalis pedis and posterior tibial pulses are palpable to right foot, DP is palpable to left. PT biphasic on doppler exam left. Skin temperature is warm  to cool from proximal tibial tuberosity to distal digits. CFT brisk to exposed digits. Edema noted to left heel and ankle, non-pitting. Hair growth present to bilateral feet. Quality of skin is wnl for patient age.      Dermatologic: Open lesion x2 noted to anterior aspect of right leg. The proximal lesion measures 2cm in diameter and displays a necrotic base with orange exudate present. The more distal lesion measures 3.5 cm in diameter with a necrotic base, serosanquinous and purulent drainage noted. To both lesions, negative probe to bone, negative malodor. Puncture wound with underlying eschar noted beneath superficial skin to left heel, plantar aspect. Post-debridement: left heel expresses 5cc of purulent drainage, probes to calcaneus. Extensive malodor present.      Neurovascular: Light touch sensation grossly absent to bilateral feet, restored at mid calf.      Musculoskeletal: Muscle strength 5/5 for all plantarflexors, dorsiflexors, inverters and everters to RLE, 4/5 for all quadrants LLE. Exquisite pain to palpation of left heel, and left ankle to the medial and posterior aspects. Cavus foot type noted bilateral. Semi-rigid contractures of digits 1-5 bilateral noted. No amputations noted bilateral.       RLE   7/20/21       LLE post-debridement        7/24/21         CURRENT EVALUATION 8/4/2021    Patient evaluated at bedside at time of rounds. Afebrile  Vital signs stable  Patient states no significant change on LLE pain.    On evaluation, the patient is slow to verbal stimuli but does respond. Case management is working on transferring patient. Neurology is monitoring patient's mental status. Left heel warm to touch, slight drainage negative malodor, no edema     Right anterior leg lesions without drainage, no edema      EEG completed 07/28/21, showed abnormal bihemispheric dysfunction, no active seizures. 07/30/21 Ammonia 142 --> 53  On lactulose and decreased dosages of Depakote      His liver profile from the 20th was normal.    We will continue to follow    Labs, X rays reviewed: 8/4/2021    BUN:8 --> 6 --> 6-->10->11-> 11  Cr: 0.70->0.64->0.56->0.60-> 0.70  CRP: 3.46     WBC: 5.9->7.4->7.4->7.6->7.9->7.3-> 6.4  Hb:14.8->12.4->14.0->14.1->14.1->14.1-> 13.3  Plat: 253->228->263->320->228-> 176    Cultures:  Urine:  ·   Blood:  ·   Sputum :  ·   Wound:  · 7/20/2021: MRSA    Images:    7/27/21 7/27/2021    Discussed with patient, RN, family. I have personally reviewed the past medical history, past surgical history, medications, social history, and family history, and I have updated the database accordingly.   Past Medical History:     Past Medical History:   Diagnosis Date    Abnormal thyroid biopsy     s/p left hemithyroidectomy 5/0760 - follicular adenoma    Acid reflux     Anemia     resolved - previously seen by Dr. Nelson Mosher (due to enlarged spleen)    Anesthesia     seizures with brain surgery due to blood sugar got really high    Bipolar disorder ([address]     Blood clot in vein 04/07/2016    Becca Starch     Cancer [facility]) 04/2019    thyroid    Chest pain     previously seeing [address] [place] [address] cardiologist, now seeing Dr. Nancie Bailey (LAD bridging on cath 4/2016)    Chronic back pain     Chronic bronchitis ([address]     Dr. Irene Cool Colon polyp 08/30/2016    Depression     seeing Emperatriz Barr DVT (deep venous thrombosis) ([address] [id number]    not on Coumadin due to unable to regulate - Dr. Sosa Query - no etiology found per patient    Esophageal abnormality     nodule     Fatty liver disease, nonalcoholic     U/S 29/6327 [facility]    Furuncle     legs    History of Doppler ultrasound 05/29/2011    No hemodynamically significant carotid stenosis is identified. A thyroid nodule on each side. Dedicated ultrasound of thyroid gland is suggested to further evaluate if clinically indicated.  History of pulmonary embolism 2007    s/p GFF, related to knee surgery    Hx of blood clots     leg and lung    Hyperlipidemia     severely elevated triglycerides    Hypertension     diastolic    Intracranial arachnoid cyst 11/2012    Dr. Aline Carlos drained, complicated with seizures, DKA    Irritable bowel syndrome     Liver disease     Monica Can - elevated LFT - positive smooth muscle antibody, steatosis per liver bx 3/2014 with Dr. Reed Pickard (multiple drug resistant organisms) resistance 2012    MRSA (methicillin resistant staph aureus) culture positive     h/o in foot and before brain surgery    Nephrolithiasis     noted on CT abdomen 9/2016, 6/2019    Neuropathy     Pancreatic insufficiency     Positive SANDY (antinuclear antibody)     Dr. Naila Dudley - first visit in 6/2013    Prolonged emergence from general anesthesia     Restless legs syndrome     Seizures ([address]     started with brain surgery    Sinus tachycardia     Holter 3/2013 - seeing Dr. Xochitl Mcdowell fracture [facility])     clips     Sleep apnea     positive sleep apnea per study 10/2020.     Systolic dysfunction     \"systolic bridge\"  EF 57% ECHO 9/2019    Type II or unspecified type diabetes mellitus without mention of complication, not stated as uncontrolled 2007       Past Surgical  History:     Past Surgical History:   Procedure Laterality Date    CARDIAC CATHETERIZATION      2011,5-6 years ago   [address]  3-2012   [address]  04/28/2016    [facility]     CARPAL TUNNEL RELEASE  01/2017    CHOLECYSTECTOMY  2004    COLONOSCOPY  2012    COLONOSCOPY 08/2016    2 tubular adenomas - reportedly needs repeat scope in 6 months    CRANIOTOMY  11/2012    arachnoid cyst drainage    EKG 12-LEAD  10/18/2015         ENDOSCOPY, COLON, DIAGNOSTIC      HERNIA REPAIR Right 1996    [facility]--Dr. Starla Rivero INGUINAL HERNIA REPAIR Right 09/15/2016    Robotic assisted    KNEE ARTHROSCOPY Right 2016    KNEE SURGERY Left 2007    acl and debrided twice    OTHER SURGICAL HISTORY  5-31-11    Tilt table was associated w/ nonspecific symptoms or nausea, otherwise no significant dizziness or syncope. No significant hemodynamic changes. Otherwise, unremarkable tilt table test after 30 minutes of tilting.  OTHER SURGICAL HISTORY  01/20/2017    RIGHT SHOULDER ARTHROSCOPY, OPEN STAN, OPEN ACROMIOPLASTY, BICEP TENDESIS, RIGHT CARPAL TUNNEL RELEASE    SHOULDER SURGERY Right 01/2007    THYROID LOBECTOMY N/A 1/15/2021    THYROID LOBECTOMY, UVULOPALATOPHARYNGOPLAST LAOP performed by Edgard Skelton MD at [facility] ECHOCARDIOGRAM  3-05-11    LV size and systolic function normal. EF 55-65%.      UPPER GASTROINTESTINAL ENDOSCOPY  2012    UPPER GASTROINTESTINAL ENDOSCOPY  2016    Dr. Kirk Del Real Left 10/22/2019    EGD DILATION SAVORY performed by Ivett Carr MD at [facility]    UPPER GASTROINTESTINAL ENDOSCOPY Left 10/22/2019    EGD BIOPSY performed by Ivett Carr MD at [address]  2007       Medications:      insulin lispro  3 Units Subcutaneous TID WC    lactulose  10 g Oral TID    enoxaparin  30 mg Subcutaneous BID    divalproex  500 mg Oral Nightly    divalproex  250 mg Oral Daily    doxycycline hyclate  100 mg Oral 2 times per day    metoprolol tartrate  25 mg Oral BID    gabapentin  400 mg Oral 4x Daily    insulin glargine  47 Units Subcutaneous BID    zonisamide  500 mg Oral Nightly    DULoxetine  60 mg Oral Daily    pantoprazole  40 mg Oral BID    levETIRAcetam  2,000 mg Oral BID    rOPINIRole  1 mg Oral TID    insulin lispro  0-12 Units Subcutaneous TID WC    insulin lispro  0-6 Units Subcutaneous Nightly    lacosamide  200 mg Oral BID    lactobacillus  1 capsule Oral Daily    sodium chloride flush  5-40 mL Intravenous 2 times per day       Social History:     Social History     Socioeconomic History    Marital status:      Spouse name: Not on file    Number of children: 3    Years of education: Not on file    Highest education level: Not on file   Occupational History    Occupation: Disability since 2011   Tobacco Use    Smoking status: Never Smoker    Smokeless tobacco: Never Used   Vaping Use    Vaping Use: Never used   Substance and Sexual Activity    Alcohol use: No     Alcohol/week: 0.0 standard drinks    Drug use: No    Sexual activity: Not on file   Other Topics Concern    Not on file   Social History Narrative    Not on file     Social Determinants of Health     Financial Resource Strain: Medium Risk    Difficulty of Paying Living Expenses: Somewhat hard   Food Insecurity: No Food Insecurity    Worried About Running Out of Food in the Last Year: Never true    Jeffry of Food in the Last Year: Never true   Transportation Needs:     Lack of Transportation (Medical):      Lack of Transportation (Non-Medical):    Physical Activity:     Days of Exercise per Week:     Minutes of Exercise per Session:    Stress:     Feeling of Stress :    Social Connections:     Frequency of Communication with Friends and Family:     Frequency of Social Gatherings with Friends and Family:     Attends Restoration Services:     Active Member of Clubs or Organizations:     Attends Club or Organization Meetings:     Marital Status:    Intimate Partner Violence:     Fear of Current or Ex-Partner:     Emotionally Abused:     Physically Abused:     Sexually Abused:        Family History:     Family History   Problem Relation Age of Onset  Heart Disease Father 61        MI    Stroke Brother     Obesity Brother     Arthritis Mother     Seizures Mother     Obesity Sister     Other Brother         pulmonary embolism    Diabetes Daughter     Seizures Brother         Allergies:   Ciprofloxacin-ciproflox hcl er, Heparin, Metformin, Niacin and related, Tramadol hcl, Ultram [tramadol], and Warfarin     Review of Systems:   Unable to assess as patient is nonverbal at this time    Constitutional: No fevers or chills. No systemic complaints  Head: No headaches  Eyes: No double vision or blurry vision. No conjunctival inflammation. ENT: No sore throat or runny nose. . No hearing loss, tinnitus or vertigo. Cardiovascular: No chest pain or palpitations. No shortness of breath. No EWING  Lung: No shortness of breath or cough. No sputum production  Abdomen: No nausea, vomiting, diarrhea, or abdominal pain. Crystal Ananya No cramps. Genitourinary: No increased urinary frequency, or dysuria. No hematuria. No suprapubic or CVA pain  Musculoskeletal: No muscle aches or pains. No joint effusions, swelling or deformities  Hematologic: No bleeding or bruising. Neurologic: No headache, weakness, numbness, or tingling. Integument: No rash, no ulcers. Psychiatric: No depression. Endocrine: No polyuria, no polydipsia, no polyphagia. Physical Examination :     Patient Vitals for the past 8 hrs:   BP Temp Temp src Pulse Resp SpO2   08/04/21 0800 128/82 -- -- 63 -- --   08/04/21 0715 128/82 98.8 °F (37.1 °C) Oral 69 14 93 %   08/04/21 0415 120/73 98 °F (36.7 °C) Oral 69 16 --     General Appearance: Awake, alert, and in no apparent distress-nonverbal  Head:  Normocephalic, no trauma  Eyes: Pupils equal, round, reactive to light and accommodation; extraocular movements intact; sclera anicteric; conjunctivae pink. No embolic phenomena. ENT: Oropharynx clear, without erythema, exudate, or thrush. No tenderness of sinuses. Mouth/throat: mucosa pink and moist. No lesions. Dentition in good repair. Neck:Supple, without lymphadenopathy. Thyroid normal, No bruits. Pulmonary/Chest: Clear to auscultation, without wheezes, rales, or rhonchi. No dullness to percussion. Cardiovascular: Regular rate and rhythm without murmurs, rubs, or gallops. Abdomen: Soft, non tender. Bowel sounds normal. No organomegaly  All four Extremities: No cyanosis, clubbing, edema, or effusions. Neurologic: No gross sensory or motor deficits. Skin: 2 healing ulcerations on the right lower leg and 1 left foot wound status post I&D sutures clean dry intact  Medical Decision Making -Laboratory:   I have independently reviewed/ordered the following labs:    CBC with Differential:   Recent Labs     08/03/21  0651 08/04/21  0636   WBC 7.3 6.4   HGB 14.1 13.3   HCT 40.1* 39.4*    176   LYMPHOPCT 30 34   MONOPCT 8 9     BMP:   Recent Labs     08/03/21  0651 08/04/21  0636    139   K 3.6* 4.0   * 107   CO2 21 20   BUN 11 11   CREATININE 0.70 0.70     Hepatic Function Panel:   No results for input(s): PROT, LABALBU, BILIDIR, IBILI, BILITOT, ALKPHOS, ALT, AST in the last 72 hours. No results for input(s): RPR in the last 72 hours. No results for input(s): HIV in the last 72 hours. No results for input(s): BC in the last 72 hours. Lab Results   Component Value Date    MUCUS Present 10/17/2015    PH 7.40 06/26/2020    PH 5.5 01/04/2012    RBC 4.49 08/04/2021    RBC 4.93 03/26/2012    WBC 6.4 08/04/2021    YEAST NONE SEEN 08/12/2019     Lab Results   Component Value Date    CREATININE 0.70 08/04/2021    GLUCOSE 294 08/04/2021    GLUCOSE 189 11/12/2015       Medical Decision Making-Imaging:   XR FOOT LEFT (MIN 3 VIEWS)     Result Date: 7/20/2021  PROCEDURE: XR FOOT LEFT (MIN 3 VIEWS) CLINICAL INFORMATION: pain COMPARISON: No prior study. TECHNIQUE: 4 views of the foot were obtained. FINDINGS: No acute fracture or dislocation is seen. There is no soft tissue swelling.  There is a moderate-sized plantar calcaneal spur.      Plantar calcaneal spur. Otherwise normal left foot. **This report has been created using voice recognition software. It may contain minor errors which are inherent in voice recognition technology. ** Final report electronically signed by Dr. Mayberry Both on 7/20/2021 3:57 PM     CT TIBIA FIBULA LEFT WO CONTRAST     Result Date: 7/20/2021  PROCEDURE: NONCONTRAST CT LEFT TIBIA/FIBULA: CLINICAL INFORMATION: Infection/ abscess TECHNIQUE: Multiple axial 3 mm images of the left tibia/fibula were obtained without administration of intravenous contrast material. Computer generated sagittal and coronal images of the left tibia/fibula were also reconstructed. ALL CT SCANS AT THIS FACILITY use dose modulation, iterative reconstruction, and/or weight-based dosing when appropriate to reduce radiation dose to as low as reasonably achievable. FINDINGS: There are bony defects in the distal femur and proximal tibia conjunction with prior anterior crucial ligament repair. No acute fracture is seen. Appears be a small bone cyst in the lateral malleolus. There is mild degenerative spurring of the distal femur. Note that there appear to be multiple superficial varicosities in the distal left calf      1. Degenerative changes of the left knee. Postsurgical changes left knee. Multiple varicosities this left calf. 2. Study otherwise unremarkable. No tibial or fibular fracture is seen. **This report has been created using voice recognition software. It may contain minor errors which are inherent in voice recognition technology. ** Final report electronically signed by Dr. Mayberry Both on 7/20/2021 9:07 PM    8/1/21 brain MRI  Area of encephalomalacia and gliosis in the right frontal lobe and medial aspect the cerebellar hemispheres from previous areas of injury or insult.  No acute brain parenchymal abnormality.        Medical Decision [id number]:     7/23/2021  2:48 PM - Andrea, Robin Hair Incoming Lab Results From [facility]    Specimen Information: Heel        Component Collected Lab   Anaerobic Culture 07/20/2021  6:15  [facility]   Culture yielded heavy mixed growth which included anaerobic gram negative bacilli and anaerobic gram positive cocci. If a true mixed aerobic and anaerobic infection is suspected, then broad spectrum empiric antibiotic therapy is indicated and should include coverage for anaerobic organisms. Gram Stain Result 07/20/2021  6:15  [facility]   Few segmented neutrophils observed. Rare epithelial cells observed. Many gram positive cocci occurring singly and in pairs. Organism Abnormal  07/20/2021  6:15  [facility]   Staphylococcus aureus    Aerobic Culture 07/20/2021  6:15  [facility]   light growth This is a MRSA (Methicillin Resistant Staphylococcus aureus)isolate. Isolates of MRSA (ORSA) Methicillin (Oxacillin) Resistant Staphylococcus aureus (coagulase positive) require patient be placed in CONTACT isolation. Methicillin(Oxacillin)resistant strains of staphylococci (MRSA)or(MRSE)should be considered resistant to all classes of cephalosporins, penems and beta-lactams. In the treatment of gram positive infections, GENTAMICIN should be CONSIDERED a SYNERGYSTIC agent ONLY. Ciprofloxacin and Levofloxacin, regardless of in vitro sensitivity, should not be used for staphylococcal infections other than uncomplicated lower UTIs. Testing Performed By    [facility] Name Director Address Valid Date Range   315-KB - 72523 Ryder Benjamin MD [address] [place] [address] [id number] [id number] 08/30/17 0855-Present   Narrative  Performed by: [facility]  Source: heel       Site: swab + tissue left          Current Antibiotics: Vancomycin   Susceptibility    Staphylococcus aureus (1)    Antibiotic Interpretation JODI Status   gentamicin Sensitive <=0.5 mcg/mL Final   ICR (D test) Negative Neg  mcg/mL Final    (Dtest) ICR- inducible clinda resistance    If +, then inducible erm gene       present. Clindamycin may be       effective in some patients.       oxacillin Resistant >=4 mcg/mL Final   clindamycin Sensitive <=0.25 mcg/mL Final   trimethoprim-sulfamethoxazole Sensitive <=10 mcg/mL Final   vancomycin Sensitive <=0.5 mcg/mL Final   tetracycline Sensitive <=1 mcg/mL Final   Lab and Collection    Culture, Anaerobic and Aerobic - 7/20/2021  Result History    Culture, Anaerobic and Aerobic on 7/23/2021 - Result Edited     MRI 8/1/21  Impression   Areas of encephalomalacia and gliosis in the right frontal lobe and medial   aspect the cerebellar hemispheres from previous areas of injury or insult. No acute brain parenchymal abnormality.           Medical Decision Making-Other:     Note:  · Labs, medications, radiologic studies were reviewed with personal review of films  · Large amounts of data were reviewed  · Discussed with nursing Staff, Discharge planner  · Infection Control and Prevention measures reviewed  · All prior entries were reviewed  · Administer medications as ordered  · Prognosis: Fair  · Discharge planning reviewed    Thank you for allowing us to participate in the care of this patient. Please call with questions. Electronically signed by Lamont Egan DPM on 8/4/2021 at 10:01 AM    ATTESTATION:    I have discussed the case, including pertinent history and exam findings with the residents and students. I have seen and examined the patient and the key elements of the encounter have been performed by me. I was present when the student obtained his information or examined the patient. I have reviewed the laboratory data, other diagnostic studies and discussed them with the residents. I have updated the medical record where necessary. I agree with the assessment, plan and orders as documented by the resident/ student.     Javier Valle MD.

## 2021-08-04 NOTE — PROGRESS NOTES
[facility]  Office: [address], DO, Ava Cardenas, DO, Keyona Wilson, DO, Leroy Jyoti Juni, DO, Telly Millan MD, Vineet Matias MD, Marco Freeman MD, Abhi Poe MD, Key Ba MD, Denise Rebolledo MD, Lorrie Shi MD, David Floyd, DO, Juvencio Faulkner MD, Kandy Russell DO, Gail Son MD,  Sabrina Farley DO, Norris Holter, MD, John Calloway MD, Zaira Crespo MD, Lizbet Haque MD, Daniel Baltazar MD, Sergio Marshall MD, Kerry Tariq MD, Basia Arreaga, CNP, [facility], CNP, Irene Hui, CNP, Luis Pool, CNS, Marky Leger, CNP, Geovany Hughes, CNP, Maryam Celestin, CNP, Samra Ramirez, CNP, Rosalva Haas, CNP, SHIRA ShepherdC, Jean Hernandez, [facility], Alicja Holland, CNP, Elisa Lopez, CNP, Justo Aleman, CNP, Leeanne Mchugh, CNP, Melyssa Chester, CNP, Tony Pearson, CNP, Apolonia Brown, CNP, Karina Delacruz, [address] [place] [address]    Progress Note    8/4/2021    1:12 PM    Name:   Hallie Carter  MRN:     [id number]     Acct:      [de-identified]   Room:   [address] [place] [address] Day:  8  Admit Date:  7/27/2021  3:42 PM    PCP:   Braulio Franco MD  Code Status:  Full Code    Subjective:     C/C: Seizures and altered mentation  Interval History Status: improved. Patient is doing much better today  He is oriented x3  No acute issues  Took his lactulose overnight however still waiting for a bowel movement    Brief History: \"This is a 30-year-old male who was transferred here from [facility] for LTM Minus Everett initially presented to the hospital for treatment of right lower extremity cellulitis. Mat Gallego was evaluated by podiatry and started on vancomycin and Zosyn and was transition to Vancomycin after cultures positive for MRSA. It was recommended he be discharged from OSH with bactrim.  Otherwise, currently being worked up for altered mental status. Found to have elevated ammonia due to depakote.  Also there was concern for repeat seizure activity so pt was placed back on LTME 7/30\"    Review of Systems:     Constitutional:  negative for chills, fevers, sweats  Respiratory:  negative for cough, dyspnea on exertion, shortness of breath, wheezing  Cardiovascular:  negative for chest pain, chest pressure/discomfort, lower extremity edema, palpitations  Gastrointestinal:  negative for abdominal pain, constipation, diarrhea, nausea, vomiting  Neurological:  negative for dizziness, headache    Medications: Allergies:     Allergies   Allergen Reactions    Ciprofloxacin-Ciproflox Hcl Er Other (See Comments)     Elevated creatinine    Heparin      Monitor for thrombocytopenia    Metformin Nausea Only     Abdominal pain, diarrhea    Niacin And Related Other (See Comments)     Burning sensation, severe flushing    Tramadol Hcl      Contraindication to seizure medications    Ultram [Tramadol]      Contraindication to seizure medications    Warfarin      Very difficult to regulate in past       Current Meds:   Scheduled Meds:    insulin lispro  3 Units Subcutaneous TID WC    lactulose  10 g Oral TID    enoxaparin  30 mg Subcutaneous BID    divalproex  500 mg Oral Nightly    divalproex  250 mg Oral Daily    doxycycline hyclate  100 mg Oral 2 times per day    metoprolol tartrate  25 mg Oral BID    gabapentin  400 mg Oral 4x Daily    insulin glargine  47 Units Subcutaneous BID    zonisamide  500 mg Oral Nightly    DULoxetine  60 mg Oral Daily    pantoprazole  40 mg Oral BID    levETIRAcetam  2,000 mg Oral BID    rOPINIRole  1 mg Oral TID    insulin lispro  0-12 Units Subcutaneous TID WC    insulin lispro  0-6 Units Subcutaneous Nightly    lacosamide  200 mg Oral BID    lactobacillus  1 capsule Oral Daily    sodium chloride flush  5-40 mL Intravenous 2 times per day     Continuous Infusions:    dextrose      sodium chloride       PRN Meds: LORazepam, glucose, dextrose, glucagon (rDNA), dextrose, sodium chloride flush, sodium chloride, potassium chloride **OR** potassium alternative oral replacement **OR** potassium chloride, magnesium sulfate, ondansetron **OR** ondansetron, polyethylene glycol, acetaminophen **OR** acetaminophen    Data:     Past Medical History:   has a past medical history of Abnormal thyroid biopsy, Acid reflux, Anemia, Anesthesia, Bipolar disorder ([address] Blood clot in vein, Cancer ([facility] [facility] Chest pain, Chronic back pain, Chronic bronchitis ([facility] [address] Colon polyp, Depression, DVT (deep venous thrombosis) ([facility] [address] Esophageal abnormality, Fatty liver disease, nonalcoholic, Furuncle, History of Doppler ultrasound, History of pulmonary embolism, Hx of blood clots, Hyperlipidemia, Hypertension, Intracranial arachnoid cyst, Irritable bowel syndrome, Liver disease, MDRO (multiple drug resistant organisms) resistance, MRSA (methicillin resistant staph aureus) culture positive, Nephrolithiasis, Neuropathy, Pancreatic insufficiency, Positive SANDY (antinuclear antibody), Prolonged emergence from general anesthesia, Restless legs syndrome, Seizures ([facility] [address] Sinus tachycardia, Skull fracture ([facility] [address] Sleep apnea, Systolic dysfunction, and Type II or unspecified type diabetes mellitus without mention of complication, not stated as uncontrolled. Social History:   reports that he has never smoked. He has never used smokeless tobacco. He reports that he does not drink alcohol and does not use drugs.      Family History:   Family History   Problem Relation Age of Onset    Heart Disease Father 61        MI    Stroke Brother     Obesity Brother     Arthritis Mother     Seizures Mother     Obesity Sister     Other Brother         pulmonary embolism    Diabetes Daughter     Seizures Brother        Vitals:  /82   Pulse 67   Temp 97.6 °F (36.4 °C) (Oral)   Resp 16   Ht 6' 2\" (1.88 m)   Wt 250 lb 14.4 oz (113.8 kg)   SpO2 90%   BMI 32.21 kg/m²   Temp (24hrs), Av.3 °F (36.8 °C), Min:97.6 °F (36.4 °C), Max:98.8 °F (37.1 °C)    Recent Labs     08/03/21  1640 08/03/21 2022 08/04/21  0719 08/04/21  1147   POCGLU 142* 172* 322* 176*       I/O (24Hr): Intake/Output Summary (Last 24 hours) at 8/4/2021 1312  Last data filed at 8/4/2021 0928  Gross per 24 hour   Intake 610 ml   Output 1300 ml   Net -690 ml       Labs:  Hematology:  Recent Labs     08/02/21  0608 08/03/21  0651 08/04/21  0636   WBC 7.9 7.3 6.4   RBC 4.74 4.71 4.49   HGB 14.1 14.1 13.3   HCT 40.7 40.1* 39.4*   MCV 85.9 85.1 87.8   MCH 29.7 29.9 29.6   MCHC 34.6 35.2* 33.8   RDW 12.0 12.1 12.1    228 176   MPV 10.7 9.9 10.2     Chemistry:  Recent Labs     08/02/21  0608 08/03/21  0651 08/04/21  0636    141 139   K 4.0 3.6* 4.0    108* 107   CO2 20 21 20   GLUCOSE 165* 236* 294*   BUN 10 11 11   CREATININE 0.60* 0.70 0.70   ANIONGAP 11 12 12   LABGLOM >60 >60 >60   GFRAA >60 >60 >60   CALCIUM 8.4* 8.5* 8.3*     Recent Labs     08/03/21  0655 08/03/21  0814 08/03/21  1146 08/03/21  1640 08/03/21 2022 08/04/21  0719 08/04/21  1024 08/04/21  1147   AMMONIA  --  102*  --   --   --   --  70*  --    POCGLU 232*  --  159* 142* 172* 322*  --  176*     ABG:  Lab Results   Component Value Date    PH 7.40 06/26/2020    PH 5.5 01/04/2012    PCO2 40 06/26/2020    PO2 71 06/26/2020    HCO3 25 06/26/2020    O2SAT 94 06/26/2020     No results found for: SPECIAL  No results found for: CULTURE    Radiology:  MRI BRAIN WO CONTRAST    Result Date: 8/2/2021  Areas of encephalomalacia and gliosis in the right frontal lobe and medial aspect the cerebellar hemispheres from previous areas of injury or insult. No acute brain parenchymal abnormality.        Physical Examination:        General appearance:  alert, cooperative and no distress  Mental Status:  Slow in response but oriented to person, place and time and normal affect  Lungs:  clear to auscultation bilaterally, normal effort  Heart:  regular rate and rhythm, no murmur  Abdomen:  soft, nontender, lovenox, platelets normal    continue speech therapy    Plan to discharge today    Bebe Calix MD  8/4/2021  1:12 PM

## 2021-08-04 NOTE — PROGRESS NOTES
Neurology Nurse Practitioner Progress Note      INTERVAL HISTORY: This is a 46 y.o.  male admitted 7/27/2021 for LTME. This is a follow-up neurology progress note. The patient was examined and the chart was reviewed. Discussed with the pt & RN. Pt stays alert, was oriented; mentation seemed improved; denied any new c/o. HPI: Fei James is a 46 y.o. male with H/O R frontal subarachnoid cyst s/p R craniectomy (11/2012), intractable seizures post-craniectomy, L hemithyroidectomy d/t follicular adenoma, HTN, HLD, DM with neuropathy, DVT/PE s/p vena cava filter placement (2007), bipolar disorder, chronic back pain, non-alcoholic fatty liver, MDRO, who was admitted as a transfer from [facility] on 7/27/2021 for LTME. As per medical records, patient was initially admitted at [facility] on 07/20/2021 with right lower extremity cellulitis and left heel wound; he was found to have MRSA and was started on antibiotics. During his stay, he developed encephalopathy. Patient was evaluated by the neurology team there. EEG showed right sided slowing with intermittent sharp waves that may be epileptogenic in nature, there was also excessive slowing in the theta and delta range. Family reported that patient had slept poorly over the last few days and had been increasingly disoriented. On 7/25, he was given a dose of Ativan and loading dose of Depakote 500 mg IVPB x1. Next morning patient was more alert, however that afternoon wife reported that he was confused like he usually is during his seizures. Wife had requested transfer to [facility] for LTME however that got delayed. Eventually patient was transferred to [facility] on 07/27/2021 for LTME to rule out subclinical seizures; neurology was consulted. Patient developed seizures after he underwent right craniectomy for the resection of right frontal subarachnoid cyst in 11/2012.   He follows up with neurologist in [place] and has been on 4 different AEDs due to intractable epilepsy.  insulin lispro  3 Units Subcutaneous TID WC    lactulose  10 g Oral TID    enoxaparin  30 mg Subcutaneous BID    divalproex  500 mg Oral Nightly    divalproex  250 mg Oral Daily    doxycycline hyclate  100 mg Oral 2 times per day    metoprolol tartrate  25 mg Oral BID    gabapentin  400 mg Oral 4x Daily    insulin glargine  47 Units Subcutaneous BID    zonisamide  500 mg Oral Nightly    DULoxetine  60 mg Oral Daily    pantoprazole  40 mg Oral BID    levETIRAcetam  2,000 mg Oral BID    rOPINIRole  1 mg Oral TID    insulin lispro  0-12 Units Subcutaneous TID WC    insulin lispro  0-6 Units Subcutaneous Nightly    lacosamide  200 mg Oral BID    lactobacillus  1 capsule Oral Daily    sodium chloride flush  5-40 mL Intravenous 2 times per day       Past Medical History:   Diagnosis Date    Abnormal thyroid biopsy     s/p left hemithyroidectomy 1/2893 - follicular adenoma    Acid reflux     Anemia     resolved - previously seen by Dr. Matthew Denise (due to enlarged spleen)    Anesthesia     seizures with brain surgery due to blood sugar got really high    Bipolar disorder ([facility] [address]     Blood clot in vein 04/07/2016    [facility]     Cancer [facility]) 04/2019    thyroid    Chest pain     previously seeing [facility] cardiologist, now seeing Dr. Yamile Fernandez (LAD bridging on cath 4/2016)    Chronic back pain     Chronic bronchitis ([facility] [address]     Dr. Mari Loweial    Colon polyp 08/30/2016    Depression     seeing Mimi Kingsley DVT (deep venous thrombosis) ([facility] [address] [phone]    not on Coumadin due to unable to regulate - Dr. Freedom Stauffer - no etiology found per patient    Esophageal abnormality     nodule     Fatty liver disease, nonalcoholic     [facility]    Furuncle     legs    History of Doppler ultrasound 05/29/2011    No hemodynamically significant carotid stenosis is identified. A thyroid nodule on each side.  Dedicated ultrasound of thyroid gland is suggested to further evaluate if clinically indicated.  History of pulmonary embolism 2007    s/p GFF, related to knee surgery    Hx of blood clots     leg and lung    Hyperlipidemia     severely elevated triglycerides    Hypertension     diastolic    Intracranial arachnoid cyst 11/2012    Dr. Jose Gama drained, complicated with seizures, DKA    Irritable bowel syndrome     Liver disease     Mearl Mortimer - elevated LFT - positive smooth muscle antibody, steatosis per liver bx 3/2014 with Dr. Muro Oz (multiple drug resistant organisms) resistance 2012    MRSA (methicillin resistant staph aureus) culture positive     h/o in foot and before brain surgery    Nephrolithiasis     noted on CT abdomen 9/2016, 6/2019    Neuropathy     Pancreatic insufficiency     Positive SANDY (antinuclear antibody)     Dr. Peyman Romeo - first visit in 6/2013    Prolonged emergence from general anesthesia     Restless legs syndrome     Seizures ([address]     started with brain surgery    Sinus tachycardia     Holter 3/2013 - seeing Dr. Rommel Kay fracture [facility])     clips     Sleep apnea     positive sleep apnea per study 10/2020.     Systolic dysfunction     \"systolic bridge\"  EF 66% ECHO 9/2019    Type II or unspecified type diabetes mellitus without mention of complication, not stated as uncontrolled 2007       Past Surgical History:   Procedure Laterality Date    CARDIAC CATHETERIZATION      2011,5-6 years ago   [address]  3-2012   [address]  04/28/2016    [facility]     CARPAL TUNNEL RELEASE  01/2017    CHOLECYSTECTOMY  2004    COLONOSCOPY  2012    COLONOSCOPY  08/2016    2 tubular adenomas - reportedly needs repeat scope in 6 months    CRANIOTOMY  11/2012    arachnoid cyst drainage    EKG 12-LEAD  10/18/2015         ENDOSCOPY, COLON, DIAGNOSTIC      HERNIA REPAIR Right 1996    [facility]--Dr. Deniz Maurer INGUINAL HERNIA REPAIR Right 09/15/2016    Robotic assisted    KNEE ARTHROSCOPY Right 2016    KNEE SURGERY Left 2007    acl and debrided twice    OTHER SURGICAL HISTORY  5-31-11    Tilt table was associated w/ nonspecific symptoms or nausea, otherwise no significant dizziness or syncope. No significant hemodynamic changes. Otherwise, unremarkable tilt table test after 30 minutes of tilting.  OTHER SURGICAL HISTORY  01/20/2017    RIGHT SHOULDER ARTHROSCOPY, OPEN STAN, OPEN ACROMIOPLASTY, BICEP TENDESIS, RIGHT CARPAL TUNNEL RELEASE    SHOULDER SURGERY Right 01/2007    THYROID LOBECTOMY N/A 1/15/2021    THYROID LOBECTOMY, UVULOPALATOPHARYNGOPLAST LAOP performed by Pamela Marx MD at [facility] ECHOCARDIOGRAM  3-05-11    LV size and systolic function normal. EF 55-65%.  UPPER GASTROINTESTINAL ENDOSCOPY  2012    UPPER GASTROINTESTINAL ENDOSCOPY  2016    Dr. Joy Chopra Left 10/22/2019    EGD DILATION SAVORY performed by Bo Allen MD at [facility] ENDOSCOPY Left 10/22/2019    EGD BIOPSY performed by Bo Allen MD at [address] 2007       PHYSICAL EXAM:      Blood pressure 128/82, pulse 63, temperature 98.8 °F (37.1 °C), temperature source Oral, resp. rate 14, height 6' 2\" (1.88 m), weight 250 lb 14.4 oz (113.8 kg), SpO2 93 %.       Neurological Examination:  Mental status   Pt stays alert, oriented; mentation seemed improved with better responses; followed commands   Cranial nerves   II - visual fields intact to confrontation; pupils reactive  III, IV, VI - extraocular muscles intact; no KARIS; no nystagmus; no ptosis   V - normal facial sensation                                                               VII - normal facial symmetry                                                             VIII - intact hearing                                                                             IX, X - symmetrical palate elevation XI - symmetrical shoulder shrug                                                       XII - midline tongue without atrophy or fasciculation   Motor function  Strength: Able to raise all limbs antigravity, UEs>LEs  Normal bulk and tone                  Sensory function Diminished sensation in stocking distribution     Cerebellar No visible tremors   Reflex function Ankle reflexes - absent; patellar reflexes 1/4  UEs 2/4 symmetric    Gait                  Not tested       DATA      Lab Results   Component Value Date    WBC 6.4 08/04/2021    HGB 13.3 08/04/2021    HCT 39.4 (L) 08/04/2021     08/04/2021    ALT 46 (H) 07/29/2021    AST 22 07/29/2021     08/04/2021    K 4.0 08/04/2021     08/04/2021    AMMONIA 70 (H) 08/04/2021    CREATININE 0.70 08/04/2021    BUN 11 08/04/2021    CO2 20 08/04/2021    TSH 1.72 07/28/2021    PSA 0.27 06/28/2021    INR 1.08 01/23/2021    YKNFSUHF24 768 04/21/2021    FOLATE 12.2 04/21/2021    GLUF 283 (H) 10/17/2015    LABA1C 8.3 (H) 07/20/2021    LABMICR < 1.20 12/29/2020     Lab Results   Component Value Date    CHOL 147 12/29/2020    CHOL 133 12/26/2019    CHOL 166 10/06/2017     Lab Results   Component Value Date    TRIG 246 (H) 04/06/2021    TRIG 860 (H) 12/29/2020    TRIG 334 (H) 12/26/2019     Lab Results   Component Value Date    HDL 26 12/29/2020    HDL 29 12/26/2019    HDL 32 10/06/2017     Lab Results   Component Value Date    LDLCALC SEE BELOW 12/29/2020    LDLCALC 37 12/26/2019    LDLCALC 64 10/06/2017      7/25/2021 09:43 7/26/2021 05:33 7/27/2021 18:11 7/29/2021 11:58   Lacosamide  9.9 8.9    Levetiracetam  38 34    Valproic Acid Lvl 42.8 (L)  44 (L) 66   Valproic Acid, Free   3.9 (L) 6.9 (L)   Valproic acid % free   8.9 10.5   Zonisamide   13         7/27/2021 18:11 7/30/2021 05:44 7/30/2021 08:51 8/3/2021    8/4/2021   08:14          10:24   Ammonia 109 (H) 142 (HH) 53 102 (H)         70 (H)        7/31/2021 07:58   CRP <3.0   ESR 4 DIAGNOSTIC DATA:  MRI BRAIN (8/2/2021):   Areas of encephalomalacia & gliosis in R frontal lobe & medial aspect of    cerebellar hemispheres from previous areas of injury or insult. LTME (7/28 - 7/30/2021): Severely abnormal record with evidence of bihemispheric dysfunction no active seizures recorded but clear-cut ictal propensity recording consistent with resolved status head dominant focus appears to be residual left frontal area                        IMPRESSION: 46 y.o.  male admitted with  Toxic metabolic encephalopathy in the setting of hyperammonemia (109) & RLE MRSA cellulitis; patient is on doxycycline (till 8/12/21) & lactulose    Pt stays alert, was oriented; today is the 1st time that he knew the name of hospital; better responses; denied any new c/o    Patient was transferred to [facility] for LTME to r/o subclinical seizures; H/O intractable seizure disorder post-R craniectomy (11/2012) d/t R frontal subarachnoid cyst resection; LTME - bi-hemispheric dysfunction with no epileptiform discharges. No clinical seizures reported    NH3 53 -> 102 -> 70 (8/4)    Diabetic neuropathy; is on Neurontin 400 mg QID    RLS; on Requip 1 mg TID    Comorbid conditions - L hemithyroidectomy due to follicular adenoma (0/79/85), HTN, HLD, DVT/PE s/p vena cava filter placement (2007), bipolar disorder, chronic back pain, IBS, non-alcoholic fatty liver, MDRO, nephrolithiasis, pancreatic insufficiency, MISTI, depression            PLAN:  Continue Depakote DR as 250 + 500 mg (also used for mood disorder); Vimpat 200 mg BID, Keppra 2 g BID PO & Zonegran 500 mg HS    Continue PT/OT    Patient F/U with his neurologist in [facility]    Pt is neurologically stable for D/C. We will sign off at this time. Please, call with any questions or concerns. Thank you    Please note that this note was generated using a voice recognition dictation software.  Although every effort was made to ensure the accuracy of this automated transcription, some errors in transcription may have occurred.

## 2021-08-04 NOTE — CARE COORDINATION
Transitional Planning     Call from [employer] at [address] [facility]; PM&R dr. Jacqueline Hawley that patient will not tolerate ARU as he is unable to maintain WBS of LLE. 1045 Left VM for [facility] to review patient's updated notes. 1240 Left VM for [facility]; asked about transition unit per patient's wife. 1300 VM from [facility]; no therapy notes in patient's chart, also stated they do not have a transition unit. 1600 left VM for placido notifying her that patient's therapy notes are charted. 1625 spoke with Rory Keith director at [facility]; patient was denied d/t not meeting diagnosis criteria (60/40) will review patient's case again and determine if able to accept patient.

## 2021-08-04 NOTE — PROGRESS NOTES
Occupational Therapy  Facility/Department: [address] STEPDOWN  Daily Treatment Note  NAME: Augustus Rizzo  : 1970  MRN: [id number]    Date of Service: 2021    Discharge Recommendations:  Patient would benefit from continued therapy after discharge  OT Equipment Recommendations  ADL Assistive Devices: Grab Bars - shower; Toileting - Drop Arm Commode, Heavy Duty Drop Arm Commode;Transfer Tub Bench;Reacher    Assessment   Performance deficits / Impairments: Decreased functional mobility ; Decreased ADL status; Decreased strength;Decreased endurance;Decreased balance;Decreased high-level IADLs  Prognosis: Good  Patient Education: Pt educated on WB precautions, benefits of OOB activity, benefits of continued therapy- good return  REQUIRES OT FOLLOW UP: Yes  Activity Tolerance  Activity Tolerance: Patient Tolerated treatment well  Safety Devices  Safety Devices in place: Yes  Type of devices: All fall risk precautions in place;Call light within reach;Nurse notified; Left in chair;Chair alarm in place  Restraints  Initially in place: No         Patient Diagnosis(es): There were no encounter diagnoses.       has a past medical history of Abnormal thyroid biopsy, Acid reflux, Anemia, Anesthesia, Bipolar disorder ([address] Blood clot in vein, Cancer ([address] Chest pain, Chronic back pain, Chronic bronchitis ([address] Colon polyp, Depression, DVT (deep venous thrombosis) ([address] Esophageal abnormality, Fatty liver disease, nonalcoholic, Furuncle, History of Doppler ultrasound, History of pulmonary embolism, Hx of blood clots, Hyperlipidemia, Hypertension, Intracranial arachnoid cyst, Irritable bowel syndrome, Liver disease, MDRO (multiple drug resistant organisms) resistance, MRSA (methicillin resistant staph aureus) culture positive, Nephrolithiasis, Neuropathy, Pancreatic insufficiency, Positive SANDY (antinuclear antibody), Prolonged emergence from general anesthesia, Restless legs syndrome, Seizures ([address] Sinus tachycardia, Skull fracture ([facility] Sleep apnea, Systolic dysfunction, and Type II or unspecified type diabetes mellitus without mention of complication, not stated as uncontrolled. has a past surgical history that includes knee surgery (Left, 2007); Vena Cava Filter Placement (2007); hernia repair (Right, 1996); Cholecystectomy (2004); Upper gastrointestinal endoscopy (2012); transthoracic echocardiogram (3-05-11); other surgical history (5-31-11); Cardiac catheterization; Cardiac catheterization (3-2012); craniotomy (11/2012); Tonsillectomy; Endoscopy, colon, diagnostic; EKG 12 Lead (10/18/2015); Knee arthroscopy (Right, 2016); Cardiac catheterization (04/28/2016); Upper gastrointestinal endoscopy (2016); Inguinal hernia repair (Right, 09/15/2016); Colonoscopy (2012); Colonoscopy (08/2016); other surgical history (01/20/2017); shoulder surgery (Right, 01/2007); Carpal tunnel release (01/2017); Upper gastrointestinal endoscopy (Left, 10/22/2019); Upper gastrointestinal endoscopy (Left, 10/22/2019); and Thyroid lobectomy (N/A, 1/15/2021). Restrictions  Restrictions/Precautions  Restrictions/Precautions: Weight Bearing  Required Braces or Orthoses?: No  Lower Extremity Weight Bearing Restrictions  Right Lower Extremity Weight Bearing: Weight Bearing As Tolerated  Left Lower Extremity Weight Bearing: Non Weight Bearing  Position Activity Restriction  Other position/activity restrictions:  Up with assist  Subjective   General  Chart Reviewed: Yes  Patient assessed for rehabilitation services?: Yes  Family / Caregiver Present: No  General Comment  Comments: RN ok'd for therapy, pt agreeable to session, pleasant throughout  Pain Assessment  Pain Level: 6  Pain Type: Acute pain;Chronic pain  Pain Location: Foot  Pain Orientation: Left  Non-Pharmaceutical Pain Intervention(s): Ambulation/Increased Activity;Repositioned;Rest;Therapeutic presence  Vital Signs  Patient Currently in Pain: Yes   Orientation  Orientation  Overall Orientation Status: Within Functional Limits  Objective    ADL  Grooming: Setup; Increased time to complete;Stand by assistance (Pt washed face sitting in chair)  UE Bathing: Stand by assistance; Increased time to complete;Setup (Pt washed BUE, chest, abdomen sitting in chair)  UE Dressing: Setup; Increased time to complete;Stand by assistance (Pt donned/doffed gown sitting in chair)  LE Dressing: Stand by assistance; Increased time to complete;Setup (Pt utilized 4 figure tech to don/doff socks sitting in chair)        Balance  Sitting Balance: Stand by assistance  Standing Balance: Minimal assistance  Standing Balance  Time: approx 2 min  Activity: Static standing EOB, 2 hops on R foot to pivot to chair  Comment: No AD utilized, RW recommended for further distances. Pt able to maintain NWB status on LLE  Functional Mobility  Functional - Mobility Device: No device  Activity: Other  Assist Level: Minimal assistance  Functional Mobility Comments: Stand pivot to chair, pt demo good adherence to NWB LLE. No LOB noted  Bed mobility  Supine to Sit: Stand by assistance  Sit to Supine: Unable to assess  Scooting: Stand by assistance  Comment: Pt retired to chair at end of tx  Transfers  Sit to stand: Minimal assistance  Stand to sit: Minimal assistance  Transfer Comments: No AD     Cognition  Overall Cognitive Status: Exceptions  Arousal/Alertness: Appropriate responses to stimuli  Following Commands: Follows multistep commands with increased time; Follows multistep commands with repitition  Attention Span: Appears intact  Memory: Decreased recall of precautions  Safety Judgement: Decreased awareness of need for assistance;Decreased awareness of need for safety  Problem Solving: Assistance required to identify errors made;Assistance required to correct errors made;Decreased awareness of errors  Insights: Decreased awareness of deficits  Initiation: Requires cues for some  Sequencing: Requires cues for some  Cognition Comment: Pt A&O x4 this date, able to maintain NWB status on LLE with 1 v/c before standing     Plan   Plan  Times per week: 3-5x/wk  Current Treatment Recommendations: Patient/Caregiver Education & Training, Balance Training, Functional Mobility Training, Cognitive Reorientation, Endurance Training, Pain Management, Cognitive/Perceptual Training, Safety Education & Training, Self-Care / ADL  AM-PAC Score        AM-PAC Inpatient Daily Activity Raw Score: 19 (08/04/21 1331)  AM-PAC Inpatient ADL T-Scale Score : 40.22 (08/04/21 1331)  ADL Inpatient CMS 0-100% Score: 42.8 (08/04/21 1331)  ADL Inpatient CMS G-Code Modifier : CK (08/04/21 1331)    Goals  Short term goals  Time Frame for Short term goals: By discharge, pt will be able to:  Short term goal 1: Demo bed mobility with SBA and use of handrails  Short term goal 2: Demo func transfers with Jarrett and following all WB precautions with less than 2 VCs  Short term goal 3: Demo static standing balance for 5+ minutes with Jarrett and LRD PRN, following all WB precautions, and 1 VC, to increase activity tolerance for functional tasks  Short term goal 4: Demo UB ADLs with SBA and 0 VCs  Short term goal 5: Demo LB ADLs with CGA, AE/DME PRN, and 0 VCs to follow WB precautions  Short term goal 6: Demo 100% accuracy to NWB precautions throughout all func tasks with less than 4 VCs       Therapy Time   Individual Concurrent Group Co-treatment   Time In 0848         Time Out 0926         Minutes 38         Timed Code Treatment Minutes: 45 Minutes   Pt in bed upon arrival, pleasant and agreeable to tx this date. Pt retired to chair at end of session, call light within reach, chair alarm on, RN notified.      ELIDA Jacques

## 2021-08-04 NOTE — PROGRESS NOTES
Physical Medicine & Rehabilitation  Progress Note    8/4/2021 6:10 PM     CC: Ambulatory and ADL dysfunction due to seizure    Subjective:   Feels much better today. Oriented. Family present. ROS:  Denies fevers, chills, sweats. No chest pain, palpitations, lightheadedness. Denies coughing, wheezing or shortness of breath. Denies abdominal pain, nausea, diarrhea or constipation. No new areas of joint pain. Denies new areas of numbness or weakness. Denies new anxiety or depression issues. No new skin problems. Rehabilitation:   PT:  Restrictions/Precautions: Weight Bearing  Other position/activity restrictions: Up with assist  Right Lower Extremity Weight Bearing: Weight Bearing As Tolerated  Left Lower Extremity Weight Bearing: Non Weight Bearing   Transfers  Sit to Stand:  (5 transfers with improved safety awareness)  Stand to sit: Minimal Assistance (Pt abl eto follow wb restrictions)  Stand Pivot Transfers: Minimal Assistance (to low toilet)  Comment: Pt great improvement with sit to stand transfers able to follow WB precautions when cued, verbal and tacticle assist for correct handplacement ascending and descending.   Ambulation 1  Surface: level tile  Device: Rolling Walker  Assistance: Minimal assistance  Quality of Gait: Pt hopping gait , steady  Distance: 15 ft x2  Comments: Hopping gait with MIN assist to CGA          OT:  ADL  Feeding: Modified independent , Setup, Increased time to complete, Verbal cueing  Grooming: Setup, Increased time to complete, Stand by assistance (Pt washed face sitting in chair)  UE Bathing: Stand by assistance, Increased time to complete, Setup (Pt washed BUE, chest, abdomen sitting in chair)  LE Bathing: Stand by assistance, Increased time to complete  UE Dressing: Setup, Increased time to complete, Stand by assistance (Pt donned/doffed gown sitting in chair)  LE Dressing: Stand by assistance, Increased time to complete, Setup (Pt utilized 4 figure tech to [employer] socks sitting in chair)  Toileting: Maximum assistance, Increased time to complete (brief mgt)  Additional Comments: pt completed ADLs seated on EOB         Balance  Sitting Balance: Stand by assistance  Standing Balance: Minimal assistance   Standing Balance  Time: approx 2 min  Activity: Static standing EOB, 2 hops on R foot to pivot to chair  Comment: No AD utilized, RW recommended for further distances. Pt able to maintain NWB status on LLE  Functional Mobility  Functional - Mobility Device: No device  Activity: Other  Assist Level: Minimal assistance  Functional Mobility Comments: Stand pivot to chair, pt demo good adherence to NWB LLE. No LOB noted     Bed mobility  Rolling to Left: Stand by assistance  Rolling to Right: Stand by assistance  Supine to Sit: Stand by assistance  Sit to Supine: Stand by assistance  Scooting: Stand by assistance  Comment: Pt good tolerance with rolling and bed mobility, utalizing bed rails, HOB raised 30 degrees  Transfers  Sit to stand: Minimal assistance  Stand to sit: Minimal assistance  Transfer Comments: No AD                   ST:      Subjective: [x]? Alert     [x]? Cooperative     []? Confused     []? Agitated    []? Lethargic        Objective/Assessment:     Recall: delayed recall with distractions: 0/3 increased to 3/3 with maximal verbal and phonemic cues along with choice of two answers,  0/3 increased to 3/3 with maximal verbal and phonemic cues along with choice of two answers.       Problem Solving/Reasoning: opposites: 12/15 increased to 15/15 with mod verbal and visual cues. Stating differences and similarities: 2/8 increased to 7/8 with maximal verbal and visual cues. Adding to concrete category: 10/12 increased to 11/12 with min to mod verbal cues.      Other: Pt expressed frustration due to not being able to recall the appropriate words. ST provided education and support for pt. ST to continue to follow.      Plan:  [x]? Continue ST services    []?  Discharge from ST:       Discharge recommendations: []? Inpatient Rehab   []? [facility]   []? Outpatient Therapy  []? Follow up at trauma clinic   [x]? Other:  Further therapy recommended at discharge      Objective:  BP (!) 136/90   Pulse 73   Temp 98.4 °F (36.9 °C) (Oral)   Resp 12   Ht 6' 2\" (1.88 m)   Wt 250 lb 14.4 oz (113.8 kg)   SpO2 94%   BMI 32.21 kg/m²  I Body mass index is 32.21 kg/m². I   Wt Readings from Last 1 Encounters:   21 250 lb 14.4 oz (113.8 kg)      Temp (24hrs), Av.3 °F (36.8 °C), Min:97.6 °F (36.4 °C), Max:98.8 °F (37.1 °C)         GEN: well developed, well nourished, no acute distress  HEENT: Normocephalic atraumatic, EOMI, mucous membranes pink and moist  CV: RRR, no murmurs, rubs or gallops  PULM: CTAB, no rales or rhonchi. Respirations WNL and unlabored  ABD: soft, NT, ND, +BS and equal  NEURO: A&O x3. Sensation intact to light touch. Memory intact 3 out of 3 in few minutes  MSK: 4/5 upper and lower extremity, sensation intact  EXTREMITIES: No calf tenderness to palpation bilaterally. No edema BLEs  SKIN: warm dry and intact with good turgor  PSYCH: appropriately interactive. Affect WNL.   Good spirits        Medications   Scheduled Meds:   insulin lispro  3 Units Subcutaneous TID WC    lactulose  10 g Oral TID    enoxaparin  30 mg Subcutaneous BID    divalproex  500 mg Oral Nightly    divalproex  250 mg Oral Daily    doxycycline hyclate  100 mg Oral 2 times per day    metoprolol tartrate  25 mg Oral BID    gabapentin  400 mg Oral 4x Daily    insulin glargine  47 Units Subcutaneous BID    zonisamide  500 mg Oral Nightly    DULoxetine  60 mg Oral Daily    pantoprazole  40 mg Oral BID    levETIRAcetam  2,000 mg Oral BID    rOPINIRole  1 mg Oral TID    insulin lispro  0-12 Units Subcutaneous TID WC    insulin lispro  0-6 Units Subcutaneous Nightly    lacosamide  200 mg Oral BID    lactobacillus  1 capsule Oral Daily    sodium chloride flush  5-40 mL Intravenous 2 times per day     Continuous Infusions:   dextrose      sodium chloride       PRN Meds:. LORazepam, glucose, dextrose, glucagon (rDNA), dextrose, sodium chloride flush, sodium chloride, potassium chloride **OR** potassium alternative oral replacement **OR** potassium chloride, magnesium sulfate, ondansetron **OR** ondansetron, polyethylene glycol, acetaminophen **OR** acetaminophen     Diagnostics:     CBC:   Recent Labs     08/02/21  0608 08/03/21  0651 08/04/21  0636   WBC 7.9 7.3 6.4   RBC 4.74 4.71 4.49   HGB 14.1 14.1 13.3   HCT 40.7 40.1* 39.4*   MCV 85.9 85.1 87.8   RDW 12.0 12.1 12.1    228 176     BMP:   Recent Labs     08/02/21  0608 08/03/21  0651 08/04/21  0636    141 139   K 4.0 3.6* 4.0    108* 107   CO2 20 21 20   BUN 10 11 11   CREATININE 0.60* 0.70 0.70     BNP: No results for input(s): BNP in the last 72 hours. PT/INR: No results for input(s): PROTIME, INR in the last 72 hours. APTT: No results for input(s): APTT in the last 72 hours. CARDIAC ENZYMES: No results for input(s): CKMB, CKMBINDEX, TROPONINT in the last 72 hours. Invalid input(s): CKTOTAL;3  FASTING LIPID PANEL:  Lab Results   Component Value Date    CHOL 147 12/29/2020    HDL 26 12/29/2020    TRIG 246 (H) 04/06/2021     LIVER PROFILE: No results for input(s): AST, ALT, ALB, BILIDIR, BILITOT, ALKPHOS in the last 72 hours. I/O (24Hr): Intake/Output Summary (Last 24 hours) at 8/4/2021 1810  Last data filed at 8/4/2021 0752  Gross per 24 hour   Intake 10 ml   Output 700 ml   Net -690 ml       Glu last 24 hour  Recent Labs     08/03/21 2022 08/04/21  0719 08/04/21  1147 08/04/21  1629   POCGLU 172* 322* 176* 234*       No results for input(s): CLARITYU, COLORU, PHUR, SPECGRAV, PROTEINU, RBCUA, BLOODU, BACTERIA, NITRU, WBCUA, LEUKOCYTESUR, YEAST, GLUCOSEU, BILIRUBINUR in the last 72 hours. Impression: Mr. Kim Raza is a 46 y.o. male with a history of Seizures ([facility] [address]     1.  Toxic metabolic encephalopathy  2. Hyper ammonia-Chronulac-decreased movement today  3. GERD-Protonix  4. MRSA cellulitis  5. Seizure-Depakote, Neurontin, Vimpat, Keppra, zonisamide  6. Left heel wound, right leg ulceration-doxycycline until 8/12/2021, nonweightbearing due to cognitive status, otherwise weightbearing just to forefoot, wound care  7. Diabetes-insulin  8. Hypertension-metoprolol  9. Bipolar disorder/depression-Cymbalta  10. History of pulmonary embolism status post Jeimy filter per notes  11. Restless leg -Requip     Recommendations:  1. Diagnosis: Toxic metabolic encephalopathy  2. Therapy: Has PT and OT as well as speech needs  3. Medical  Necessity: As above  4. Support: Clarify, spouse  5. Rehab recommendation: Would benefit from acute inpatient rehabilitation when medically, cognitively improving, doing better with  weightbearing status  6. DVT proph: Lovenox, history of vena cava placement    Discussed with patient and family     It was my pleasure to evaluate Kaya Starch today. Please call with q Luisa Ganser. Gonzalez Temple MD       This note is created with the assistance of a speech recognition program.  While intending to generate a document that actually reflects the content of the visit, the document can still have some errors including those of syntax and sound a like substitutions which may escape proof reading.   In such instances, actual meaning can be extrapolated by contextual diversion

## 2021-08-04 NOTE — PLAN OF CARE
Problem: Physical Regulation:  Goal: Signs of adequate cerebral perfusion will increase  Description: Signs of adequate cerebral perfusion will increase  Outcome: Met This Shift  Goal: Ability to maintain a stable neurologic state will improve  Description: Ability to maintain a stable neurologic state will improve  Outcome: Met This Shift     Problem: Skin Integrity:  Goal: Absence of new skin breakdown  Description: Absence of new skin breakdown  Outcome: Met This Shift     Problem: Coping:  Goal: Ability to identify appropriate support needs will improve  Description: Ability to identify appropriate support needs will improve  Outcome: Met This Shift     Problem: Safety:  Goal: Ability to remain free from injury will improve  Description: Ability to remain free from injury will improve  Outcome: Met This Shift     Problem: Self-Concept:  Goal: Ability to verbalize feelings about condition will improve  Description: Ability to verbalize feelings about condition will improve  Outcome: Met This Shift     Problem: Falls - Risk of:  Goal: Will remain free from falls  Description: Will remain free from falls  Outcome: Met This Shift  Goal: Absence of physical injury  Description: Absence of physical injury  Outcome: Met This Shift     Problem: Confusion - Acute:  Goal: Mental status will be restored to baseline  Description: Mental status will be restored to baseline  Outcome: Met This Shift     Problem: Discharge Planning:  Goal: Participates in care planning  Description: Participates in care planning  Outcome: Met This Shift     Problem: Injury - Risk of, Physical Injury:  Goal: Will remain free from falls  Description: Will remain free from falls  Outcome: Met This Shift  Goal: Absence of physical injury  Description: Absence of physical injury  Outcome: Met This Shift     Problem: Mood - Altered:  Goal: Absence of abusive behavior  Description: Absence of abusive behavior  Outcome: Met This Shift     Problem: Psychomotor Activity - Altered:  Goal: Absence of psychomotor disturbance signs and symptoms  Description: Absence of psychomotor disturbance signs and symptoms  Outcome: Met This Shift     Problem: Sensory Perception - Impaired:  Goal: Able to distinguish between reality-based and nonreality-based thinking  Description: Able to distinguish between reality-based and nonreality-based thinking  Outcome: Met This Shift     Problem: Sleep Pattern Disturbance:  Goal: Appears well-rested  Description: Appears well-rested  Outcome: Met This Shift     Problem: Nutritional:  Goal: Nutritional status will improve  Description: Nutritional status will improve  Outcome: Ongoing     Problem: Physical Regulation:  Goal: Will remain free from infection  Description: Will remain free from infection  Outcome: Ongoing  Goal: Ability to maintain vital signs within normal range will improve  Description: Ability to maintain vital signs within normal range will improve  Outcome: Ongoing     Problem: Skin Integrity:  Goal: Demonstration of wound healing without infection will improve  Description: Demonstration of wound healing without infection will improve  Outcome: Ongoing  Goal: Will show no infection signs and symptoms  Description: Will show no infection signs and symptoms  Outcome: Ongoing     Problem: Coping:  Goal: Ability to cope will improve  Description: Ability to cope will improve  Outcome: Ongoing     Problem: Health Behavior:  Goal: Ability to manage health-related needs will improve  Description: Ability to manage health-related needs will improve  Outcome: Ongoing     Problem: Musculor/Skeletal Functional Status  Goal: Highest potential functional level  Outcome: Ongoing     Problem: Pain:  Goal: Pain level will decrease  Description: Pain level will decrease  Outcome: Ongoing  Goal: Control of acute pain  Description: Control of acute pain  Outcome: Ongoing  Goal: Control of chronic pain  Description: Control of

## 2021-08-04 NOTE — PROGRESS NOTES
 Carpal tunnel syndrome of right wrist G56.01    Obesity (BMI 30-39. 9) E66.9    Lump of axilla R22.30    Familial hypercholesterolemia E78.01    Coronary artery disease of native artery of native heart with stable angina pectoris ([facility]) I25.118    Exertional dyspnea R06.00    Vitamin D deficiency E55.9    Seizure ([facility]) R56.9    Nausea and vomiting R11.2    Type II diabetes mellitus with manifestations, uncontrolled ([facility]) E11.8, E11.65    Chondromalacia of knee, right M94.261    Effusion of left knee M25.462    Elevated levels of transaminase & lactic acid dehydrogenase R74.01, R74.02    Other intervertebral disc degeneration, lumbar region M51.36    Palpitations R00.2    Splenomegaly R16.1    Sprain of right foot S93.601A    Steatohepatitis K75.81    Osteopenia M85.80    Obstructive sleep apnea Q48.88    Follicular neoplasm of thyroid D49.7    Nasal obstruction J34.89    Nasal septal deviation J34.2    Chest pain R07.9    MISTI (obstructive sleep apnea) G47.33    Acute post-operative pain G89.18    Bipolar 1 disorder, depressed ([facility]) F31.9    Status post uvulopalatopharyngoplasty Z98.890    Status post partial thyroidectomy E89.0    Wound infection T14. 8XXA, L08.9    Diabetic leg ulcer ([address] E11.622, L97.909    Seizures ([address] R56.9    Partial symptomatic epilepsy with complex partial seizures, intractable, without status epilepticus ([address] G40.219    Episodic confusion R41.0    Acute metabolic encephalopathy A07.15       Pain: 0/10    Cognitive Treatment    Treatment time: [id number]      Subjective: [x] Alert [x] Cooperative     [] Confused     [] Agitated    [] Lethargic      Objective/Assessment:    Recall: delayed recall with distractions: 0/3 increased to 3/3 with maximal verbal and phonemic cues along with choice of two answers,  0/3 increased to 3/3 with maximal verbal and phonemic cues along with choice of two answers.       Problem Solving/Reasoning: opposites: 12/15 increased to 15/15 with mod verbal and visual cues. Stating differences and similarities: 2/8 increased to 7/8 with maximal verbal and visual cues. Adding to concrete category: 10/12 increased to 11/12 with min to mod verbal cues. Other: Pt expressed frustration due to not being able to recall the appropriate words. ST provided education and support for pt. ST to continue to follow. Plan:  [x] Continue ST services    [] Discharge from ST:      Discharge recommendations: [] Inpatient Rehab   [] [facility]   [] Outpatient Therapy  [] Follow up at trauma clinic   [x] Other:  Further therapy recommended at discharge. Completed by:  Cynthia Spears  Clinician    Cosigned By: Christianna Duverney. S.CCC/SLP

## 2021-08-04 NOTE — DISCHARGE SUMMARY
[facility]  Office: [address], DO, Merari Saleem, DO, Dannielle Lauren, DO, Jarrett Alfredo, DO, Nickolas New MD, Lacey Bai MD, Queen Shabbir MD, Nabil Camara MD, Tia Carrasco MD, Samantha Chang MD, Ridge Barfield MD, Mandie Ray, DO, Raymundo Damico MD, Negrita Champion DO, Oral Klein MD,  Angel Salomon DO, Sonya Kolb MD, Luz Vergara MD, Linsey Holloway MD, Mack Grewal MD, Byron Romero MD, Fe Ware MD, Dana Will MD, Jayant Shrestha, [facility], [facility] Isaacsuzanne, CNP, [facility] Chase, CNP, Samir Barrera, CNS, Twyla Britton, CNP, Javy White, CNP, Alayna Crews, CNP, Kimberley Ford, CNP, Rosa Hawkins, CNP, Debi Sinha PA-C, Dimas Foss, [facility], Abrahan Dennis, CNP, Jesusita Salas, CNP, Taylor Santiago, [facility], Yessy Hi, CNP, James Brooke, CNP, Gary Mcnamara, CNP, Soila Loredo, CNP, [facility], [facility]    Discharge Summary     Patient ID: Pierre Bridges  :  1970   MRN: [id number]     ACCOUNT:  [de-identified]   Patient's PCP: Rudolph Torres MD  Admit Date: 2021   Discharge Date: 2021     Length of Stay: 9  Code Status:  Full Code  Admitting Physician: No admitting provider for patient encounter. Discharge Physician: Linsey Holloway MD     Active Discharge Diagnoses:     Hospital Problem Lists:  Principal Problem:    Acute metabolic encephalopathy  Active Problems:    Hypertension    Type 2 diabetes mellitus with diabetic neuropathy (HCC)    Neuropathy    Hyperammonemia (HCC)    Bipolar affective disorder (HCC)    Partial symptomatic epilepsy with complex partial seizures, not intractable, without status epilepticus (HCC)    Gastroesophageal reflux disease    Obesity (BMI 30-39. 9)    Diabetic leg ulcer ([facility]    Seizures [facility]    Partial symptomatic epilepsy with complex partial seizures, intractable, without status epilepticus ([facility]    Episodic confusion  Resolved Problems:    * No resolved hospital problems. *      Admission Condition:  fair     Discharged Condition: good    Hospital Stay:     Hospital Course:  Kaya Mccray is a 46 y.o. male who was admitted for the management of   Acute metabolic encephalopathy , presented for neurology evaluation. [facility] for cellulitis of right lower leg, initially treated with vancomycin and Zosyn however patient started getting slow to respond and had altered mentation. Neurology was consulted. Patient had significant seizure history, concern for seizures. Patient was transferred for LTM E to [place] to rule out subclinical seizures. Patient developed seizures after he underwent right craniectomy for the resection of right frontal subarachnoid cyst in 11/2012. He follows up with neurologist in [place] and has been on 4 different AEDs due to intractable epilepsy. Patient was also noted to have elevated ammonia levels likely secondary to Depakote. Neurology decrease dose of Depakote to 250 mg in the morning and 500 mg at night. Continue Keppra 2 g twice daily, Zonegran 500 mg daily, Vimpat 200 mg twice daily. Patient has been doing much better and is more alert and awake, working up with therapy, plan to discharge to rehab. Discussed with patient regarding elevated ammonia level, patient is on lactulose and ammonia has been low. His Depakote dosing is by his psychiatrist, discussed weaning down. Patient has been a difficult to control seizure patient, already on 4 antiepileptics, can continue lactulose as needed being on Depakote. Patient advised to prevent constipation and use lactulose as needed. Gabapentin decreased to 400 mg 4 times daily by neurology.       Significant therapeutic interventions:    LTME (7/28 - 7/30/2021): Severely abnormal record with evidence of bihemispheric dysfunction no active seizures recorded but clear-cut ictal propensity recording consistent with resolved status head dominant focus appears to be residual left frontal area      MRI BRAIN (8/2/2021):   Areas of encephalomalacia & gliosis in R frontal lobe & medial aspect of    cerebellar hemispheres from previous areas of injury or insult. Significant Diagnostic Studies:   Labs / Micro:  CBC:   Lab Results   Component Value Date    WBC 6.2 08/05/2021    RBC 4.44 08/05/2021    RBC 4.93 03/26/2012    HGB 13.2 08/05/2021    HCT 38.1 08/05/2021    MCV 85.8 08/05/2021    MCH 29.7 08/05/2021    MCHC 34.6 08/05/2021    RDW 12.0 08/05/2021     08/05/2021     BMP:    Lab Results   Component Value Date    GLUCOSE 160 08/05/2021    GLUCOSE 189 11/12/2015     08/05/2021    K 3.7 08/05/2021    K 3.8 07/27/2021     08/05/2021    CO2 23 08/05/2021    ANIONGAP 10 08/05/2021    BUN 10 08/05/2021    CREATININE 0.65 08/05/2021    BUNCRER NOT REPORTED 08/05/2021    CALCIUM 8.3 08/05/2021    LABGLOM >60 08/05/2021    LABGLOM >90 07/27/2021    GFRAA >60 08/05/2021    GFR      08/05/2021    GFR NOT REPORTED 08/05/2021     PT/INR:    Lab Results   Component Value Date    INR 1.08 01/23/2021     PTT:   Lab Results   Component Value Date    APTT 35.7 04/27/2016     U/A:    Lab Results   Component Value Date    COLORU YELLOW 02/08/2021    SPECGRAV >1.030 08/12/2019    HGBUR Moderate 10/17/2015    PHUR 6.5 02/08/2021    PROTEINU NEGATIVE 02/08/2021    GLUCOSEU >= 1000 02/08/2021    KETUA NEGATIVE 02/08/2021    BILIRUBINUR NEGATIVE 02/08/2021    BILIRUBINUR NEGATIVE 01/04/2012    UROBILINOGEN 1.0 02/08/2021    NITRU NEGATIVE 02/08/2021    LEUKOCYTESUR NEGATIVE 02/08/2021     TSH:    Lab Results   Component Value Date    TSH 1.72 07/28/2021        Radiology:  MRI BRAIN WO CONTRAST    Result Date: 8/3/2021  Areas of encephalomalacia and gliosis in the right frontal lobe and medial aspect the cerebellar hemispheres from previous areas of injury or insult.  No acute brain parenchymal abnormality. Consultations:    Consults:     Final Specialist Recommendations/Findings:   IP CONSULT TO INFECTIOUS DISEASES  IP CONSULT TO CASE MANAGEMENT  IP CONSULT TO PODIATRY  IP CONSULT TO PHYSICAL MEDICINE REHAB      The patient was seen and examined on day of discharge and this discharge summary is in conjunction with any daily progress note from day of discharge. Discharge plan:     Disposition: To a non-[facility] facility    Physician Follow Up:     Jarek Puentes MD  [facility], [address]  [address] [place] [address] [address]    Schedule an appointment as soon as possible for a visit in 3 days         Requiring Further Evaluation/Follow Up POST HOSPITALIZATION/Incidental Findings: make appointments with psychiatrist and neurology    Diet: regular diet    Activity: As tolerated    Instructions to Patient: Continue Depakote DR as 250 + 500 mg (also used for mood disorder); Vimpat 200 mg BID, Keppra 2 g BID PO & Zonegran 500 mg HS  Decrease lopressor dose to 25 mg   Continue lactulose, discuss depakote with neurologist, slowly weaning down  Insulin 60 units twice daily and continue home sliding scale and premeal insulin  Check BS 4 times   continue doxycycline till 8/12    Discharge Medications:      Medication List      START taking these medications    doxycycline hyclate 100 MG tablet  Commonly known as: VIBRA-TABS  Take 1 tablet by mouth every 12 hours for 7 days     gabapentin 400 MG capsule  Commonly known as: NEURONTIN  Take 1 capsule by mouth 4 times daily for 30 days.   Replaces: gabapentin 600 MG tablet     insulin glargine 100 UNIT/ML injection vial  Commonly known as: LANTUS  Inject 60 Units into the skin 2 times daily     lactulose 10 GM/15ML solution  Commonly known as: CHRONULAC  Take 15 mLs by mouth 3 times daily        CHANGE how you take these medications    * divalproex 500 MG DR tablet  Commonly known as: DEPAKOTE  Take 1 tablet by mouth nightly  What changed: when to take this * divalproex 250 MG DR tablet  Commonly known as: DEPAKOTE  Take 1 tablet by mouth daily  What changed: You were already taking a medication with the same name, and this prescription was added. Make sure you understand how and when to take each. DULoxetine 60 MG extended release capsule  Commonly known as: CYMBALTA  Take 1 capsule by mouth daily  What changed:   · how much to take  · additional instructions     insulin lispro 100 UNIT/ML injection vial  Commonly known as: HUMALOG  What changed: Another medication with the same name was removed. Continue taking this medication, and follow the directions you see here. levETIRAcetam 1000 MG tablet  Commonly known as: KEPPRA  Take 2 tablets by mouth 2 times daily  What changed: medication strength     metoprolol tartrate 25 MG tablet  Commonly known as: LOPRESSOR  Take 1 tablet by mouth 2 times daily  What changed:   · medication strength  · how much to take     zonisamide 100 MG capsule  Commonly known as: ZONEGRAN  Take 5 capsules by mouth nightly  What changed:   · how much to take  · additional instructions         * This list has 2 medication(s) that are the same as other medications prescribed for you. Read the directions carefully, and ask your doctor or other care provider to review them with you.             CONTINUE taking these medications    empagliflozin 25 MG tablet  Commonly known as: Jardiance  Take 25 mg by mouth daily     OneTouch Ultra strip  Generic drug: blood glucose test strips  1 each by In Vitro route 3 times daily DX: E11.65 Dispense Onetouch Ultra 2 test strips     pantoprazole 40 MG tablet  Commonly known as: Protonix  Take 1 tablet by mouth 2 times daily     rOPINIRole 1 MG tablet  Commonly known as: REQUIP  TAKE 1 TABLET BY MOUTH THREE TIMES A DAY     sildenafil 100 MG tablet  Commonly known as: Viagra  Take 1 tablet by mouth as needed for Erectile Dysfunction     Vimpat 100 MG Tabs tablet  Generic drug: lacosamide     vitamin D 1.25 MG (82426 UT) Caps capsule  Commonly known as: ERGOCALCIFEROL  Take 1 capsule by mouth once a week        STOP taking these medications    gabapentin 600 MG tablet  Commonly known as: NEURONTIN  Replaced by: gabapentin 400 MG capsule     promethazine 12.5 MG tablet  Commonly known as: PHENERGAN           Where to Get Your Medications      These medications were sent to [address] [place] [address] [address] - [place], [place] - [address] - P [phone] - F [phone]  [address] [place] [address]    Phone: [phone]   · divalproex 250 MG DR tablet  · divalproex 500 MG DR tablet  · doxycycline hyclate 100 MG tablet  · DULoxetine 60 MG extended release capsule  · gabapentin 400 MG capsule  · insulin glargine 100 UNIT/ML injection vial  · lactulose 10 GM/15ML solution  · levETIRAcetam 1000 MG tablet  · metoprolol tartrate 25 MG tablet  · zonisamide 100 MG capsule         No discharge procedures on file. Time Spent on discharge is  43 mins in patient examination, evaluation, counseling as well as medication reconciliation, prescriptions for required medications, discharge plan and follow up. Electronically signed by   Naveen Iniguez MD  8/5/2021  5:40 PM      Thank you Dr. Elmer Gorman MD for the opportunity to be involved in this patient's care.

## 2021-08-04 NOTE — PROGRESS NOTES
Prolonged emergence from general anesthesia, Restless legs syndrome, Seizures (HCC), Sinus tachycardia, Skull fracture (HCC), Sleep apnea, Systolic dysfunction, and Type II or unspecified type diabetes mellitus without mention of complication, not stated as uncontrolled. has a past surgical history that includes knee surgery (Left, 2007); Vena Cava Filter Placement (2007); hernia repair (Right, 1996); Cholecystectomy (2004); Upper gastrointestinal endoscopy (2012); transthoracic echocardiogram (3-05-11); other surgical history (5-31-11); Cardiac catheterization; Cardiac catheterization (3-2012); craniotomy (11/2012); Tonsillectomy; Endoscopy, colon, diagnostic; EKG 12 Lead (10/18/2015); Knee arthroscopy (Right, 2016); Cardiac catheterization (04/28/2016); Upper gastrointestinal endoscopy (2016); Inguinal hernia repair (Right, 09/15/2016); Colonoscopy (2012); Colonoscopy (08/2016); other surgical history (01/20/2017); shoulder surgery (Right, 01/2007); Carpal tunnel release (01/2017); Upper gastrointestinal endoscopy (Left, 10/22/2019); Upper gastrointestinal endoscopy (Left, 10/22/2019); and Thyroid lobectomy (N/A, 1/15/2021). Restrictions  Restrictions/Precautions  Restrictions/Precautions: Weight Bearing  Required Braces or Orthoses?: No  Lower Extremity Weight Bearing Restrictions  Right Lower Extremity Weight Bearing: Weight Bearing As Tolerated  Left Lower Extremity Weight Bearing: Non Weight Bearing  Position Activity Restriction  Other position/activity restrictions: Up with assist  Subjective   General  Chart Reviewed: Yes  Response To Previous Treatment: Patient with no complaints from previous session. Family / Caregiver Present: Yes (wife and daughter)  Subjective  Subjective: RN and pt agreeable to PT, pt supine in bed, wife at bedside, pleasant and cooperative throughout treatment. General Comment  Comments: Pt retired to bed supine with family at bedside.   Pain Screening  Patient Currently in Pain: No  Pain Assessment  Pain Assessment: 0-10  Pain Level: 0  Patient's Stated Pain Goal: No pain  Vital Signs  Patient Currently in Pain: No       Orientation  Orientation  Overall Orientation Status: Within Functional Limits  Cognition   Cognition  Overall Cognitive Status: Exceptions  Arousal/Alertness: Appropriate responses to stimuli  Following Commands: Follows multistep commands with increased time; Follows multistep commands with repitition  Attention Span: Appears intact  Memory: Decreased recall of precautions  Safety Judgement: Decreased awareness of need for assistance;Decreased awareness of need for safety  Problem Solving: Assistance required to identify errors made;Assistance required to correct errors made;Decreased awareness of errors  Insights: Decreased awareness of deficits  Initiation: Requires cues for some  Sequencing: Requires cues for some  Cognition Comment: Pt A&O x4 this date, able to maintain NWB status on LLE with 1 v/c before standing, and able to ambulate follow NWB while ambulating. Objective   Bed mobility  Rolling to Left: Stand by assistance  Rolling to Right: Stand by assistance  Supine to Sit: Stand by assistance  Sit to Supine: Stand by assistance  Scooting: Stand by assistance  Comment: Pt good tolerance with rolling and bed mobility, utalizing bed rails, HOB raised 30 degrees  Transfers  Sit to Stand:  (5 transfers with improved safety awareness)  Stand to sit: Minimal Assistance (Pt abl eto follow wb restrictions)  Stand Pivot Transfers: Minimal Assistance (to low toilet)  Comment: Pt great improvement with sit to stand transfers able to follow WB precautions when cued, verbal and tacticle assist for correct handplacement ascending and descending.   Ambulation  Ambulation?: Yes  Ambulation 1  Surface: level tile  Device: Rolling Walker  Assistance: Minimal assistance  Quality of Gait: Pt hopping gait , steady  Distance: 15 ft x2  Comments: Hopping gait with MIN assist to CGA  Stairs/Curb  Stairs?: No     Balance  Posture: Fair  Sitting - Static: Good;-  Sitting - Dynamic: Good;-  Standing - Static: Fair  Standing - Dynamic: Fair  Comments: Pt able to maintain NWB status  Exercises  Comments:Seated LE exercise program: Long Arc Quads, hip abduction/adduction, heel/toe raises, and marches.  Reps: 20 reps, with good recall      AM-PAC Score  AM-PAC Inpatient Mobility Raw Score : 17 (08/04/21 1544)  AM-PAC Inpatient T-Scale Score : 42.13 (08/04/21 1544)  Mobility Inpatient CMS 0-100% Score: 50.57 (08/04/21 1544)  Mobility Inpatient CMS G-Code Modifier : CK (08/04/21 1544)     Goals  Short term goals  Time Frame for Short term goals: 10  visits  Short term goal 1: transfers with SBA  Short term goal 2: amb 25 ft with a RW x CCGA with NWB L LE WBAT R LE  Short term goal 3: to be independent with bed mobility  Short term goal 4: 20 min exercise program x SBA  Patient Goals   Patient goals : Return home    Plan    Plan  Times per week: 5-6x wk  Times per day: Daily  Specific instructions for Next Treatment: Gait training with NWB on L, hopping gait with RW  Current Treatment Recommendations: Strengthening, Functional Mobility Training, Transfer Training, Gait Training, Endurance Training, Pain Management, Safety Education & Training  Safety Devices  Type of devices: Nurse notified, Call light within reach, Gait belt, Bed alarm in place (family in room)  Restraints  Initially in place: No     Therapy Time   Individual Concurrent Group Co-treatment   Time In 1424         Time Out 1518         Minutes 54         Timed Code Treatment Minutes: [facility]

## 2021-08-05 VITALS
OXYGEN SATURATION: 94 % | WEIGHT: 249.12 LBS | SYSTOLIC BLOOD PRESSURE: 135 MMHG | HEART RATE: 73 BPM | HEIGHT: 74 IN | BODY MASS INDEX: 31.97 KG/M2 | RESPIRATION RATE: 13 BRPM | DIASTOLIC BLOOD PRESSURE: 82 MMHG | TEMPERATURE: 98.5 F

## 2021-08-05 LAB
ABSOLUTE EOS #: 0.14 K/UL (ref 0–0.44)
ABSOLUTE IMMATURE GRANULOCYTE: 0.07 K/UL (ref 0–0.3)
ABSOLUTE LYMPH #: 2.26 K/UL (ref 1.1–3.7)
ABSOLUTE MONO #: 0.54 K/UL (ref 0.1–1.2)
ANION GAP SERPL CALCULATED.3IONS-SCNC: 10 MMOL/L (ref 9–17)
BASOPHILS # BLD: 0 % (ref 0–2)
BASOPHILS ABSOLUTE: <0.03 K/UL (ref 0–0.2)
BUN BLDV-MCNC: 10 MG/DL (ref 6–20)
BUN/CREAT BLD: ABNORMAL (ref 9–20)
CALCIUM SERPL-MCNC: 8.3 MG/DL (ref 8.6–10.4)
CHLORIDE BLD-SCNC: 109 MMOL/L (ref 98–107)
CO2: 23 MMOL/L (ref 20–31)
CREAT SERPL-MCNC: 0.65 MG/DL (ref 0.7–1.2)
DIFFERENTIAL TYPE: ABNORMAL
EOSINOPHILS RELATIVE PERCENT: 2 % (ref 1–4)
GFR AFRICAN AMERICAN: >60 ML/MIN
GFR NON-AFRICAN AMERICAN: >60 ML/MIN
GFR SERPL CREATININE-BSD FRML MDRD: ABNORMAL ML/MIN/{1.73_M2}
GFR SERPL CREATININE-BSD FRML MDRD: ABNORMAL ML/MIN/{1.73_M2}
GLUCOSE BLD-MCNC: 156 MG/DL (ref 75–110)
GLUCOSE BLD-MCNC: 160 MG/DL (ref 70–99)
GLUCOSE BLD-MCNC: 222 MG/DL (ref 75–110)
GLUCOSE BLD-MCNC: 311 MG/DL (ref 75–110)
HCT VFR BLD CALC: 38.1 % (ref 40.7–50.3)
HEMOGLOBIN: 13.2 G/DL (ref 13–17)
IMMATURE GRANULOCYTES: 1 %
LYMPHOCYTES # BLD: 37 % (ref 24–43)
MCH RBC QN AUTO: 29.7 PG (ref 25.2–33.5)
MCHC RBC AUTO-ENTMCNC: 34.6 G/DL (ref 28.4–34.8)
MCV RBC AUTO: 85.8 FL (ref 82.6–102.9)
MONOCYTES # BLD: 9 % (ref 3–12)
NRBC AUTOMATED: 0 PER 100 WBC
PDW BLD-RTO: 12 % (ref 11.8–14.4)
PLATELET # BLD: 183 K/UL (ref 138–453)
PLATELET ESTIMATE: ABNORMAL
PMV BLD AUTO: 10.1 FL (ref 8.1–13.5)
POTASSIUM SERPL-SCNC: 3.7 MMOL/L (ref 3.7–5.3)
RBC # BLD: 4.44 M/UL (ref 4.21–5.77)
RBC # BLD: ABNORMAL 10*6/UL
SEG NEUTROPHILS: 51 % (ref 36–65)
SEGMENTED NEUTROPHILS ABSOLUTE COUNT: 3.17 K/UL (ref 1.5–8.1)
SODIUM BLD-SCNC: 142 MMOL/L (ref 135–144)
WBC # BLD: 6.2 K/UL (ref 3.5–11.3)
WBC # BLD: ABNORMAL 10*3/UL

## 2021-08-05 PROCEDURE — 94761 N-INVAS EAR/PLS OXIMETRY MLT: CPT

## 2021-08-05 PROCEDURE — 6370000000 HC RX 637 (ALT 250 FOR IP): Performed by: CLINICAL NURSE SPECIALIST

## 2021-08-05 PROCEDURE — 6360000002 HC RX W HCPCS: Performed by: STUDENT IN AN ORGANIZED HEALTH CARE EDUCATION/TRAINING PROGRAM

## 2021-08-05 PROCEDURE — 36415 COLL VENOUS BLD VENIPUNCTURE: CPT

## 2021-08-05 PROCEDURE — 6370000000 HC RX 637 (ALT 250 FOR IP): Performed by: FAMILY MEDICINE

## 2021-08-05 PROCEDURE — 85025 COMPLETE CBC W/AUTO DIFF WBC: CPT

## 2021-08-05 PROCEDURE — 6370000000 HC RX 637 (ALT 250 FOR IP): Performed by: NURSE PRACTITIONER

## 2021-08-05 PROCEDURE — 82947 ASSAY GLUCOSE BLOOD QUANT: CPT

## 2021-08-05 PROCEDURE — 6370000000 HC RX 637 (ALT 250 FOR IP): Performed by: INTERNAL MEDICINE

## 2021-08-05 PROCEDURE — 99239 HOSP IP/OBS DSCHRG MGMT >30: CPT | Performed by: STUDENT IN AN ORGANIZED HEALTH CARE EDUCATION/TRAINING PROGRAM

## 2021-08-05 PROCEDURE — 99232 SBSQ HOSP IP/OBS MODERATE 35: CPT | Performed by: INTERNAL MEDICINE

## 2021-08-05 PROCEDURE — 6370000000 HC RX 637 (ALT 250 FOR IP): Performed by: STUDENT IN AN ORGANIZED HEALTH CARE EDUCATION/TRAINING PROGRAM

## 2021-08-05 PROCEDURE — 2580000003 HC RX 258: Performed by: CLINICAL NURSE SPECIALIST

## 2021-08-05 PROCEDURE — 80048 BASIC METABOLIC PNL TOTAL CA: CPT

## 2021-08-05 RX ORDER — INSULIN GLARGINE 100 [IU]/ML
60 INJECTION, SOLUTION SUBCUTANEOUS 2 TIMES DAILY
Status: DISCONTINUED | OUTPATIENT
Start: 2021-08-05 | End: 2021-08-05 | Stop reason: HOSPADM

## 2021-08-05 RX ORDER — INSULIN GLARGINE 100 [IU]/ML
60 INJECTION, SOLUTION SUBCUTANEOUS 2 TIMES DAILY
Qty: 1 VIAL | Refills: 3 | Status: SHIPPED | OUTPATIENT
Start: 2021-08-05 | End: 2021-10-27

## 2021-08-05 RX ORDER — KETOROLAC TROMETHAMINE 30 MG/ML
30 INJECTION, SOLUTION INTRAMUSCULAR; INTRAVENOUS EVERY 6 HOURS PRN
Status: DISCONTINUED | OUTPATIENT
Start: 2021-08-05 | End: 2021-08-05 | Stop reason: HOSPADM

## 2021-08-05 RX ORDER — DOXYCYCLINE HYCLATE 100 MG
100 TABLET ORAL EVERY 12 HOURS SCHEDULED
Qty: 14 TABLET | Refills: 0 | Status: SHIPPED | OUTPATIENT
Start: 2021-08-05 | End: 2021-08-12

## 2021-08-05 RX ORDER — INSULIN GLARGINE 100 [IU]/ML
50 INJECTION, SOLUTION SUBCUTANEOUS 2 TIMES DAILY
Status: DISCONTINUED | OUTPATIENT
Start: 2021-08-05 | End: 2021-08-05

## 2021-08-05 RX ADMIN — DULOXETINE HYDROCHLORIDE 60 MG: 30 CAPSULE, DELAYED RELEASE ORAL at 09:00

## 2021-08-05 RX ADMIN — INSULIN LISPRO 4 UNITS: 100 INJECTION, SOLUTION INTRAVENOUS; SUBCUTANEOUS at 08:59

## 2021-08-05 RX ADMIN — DOXYCYCLINE HYCLATE 100 MG: 100 TABLET, COATED ORAL at 09:00

## 2021-08-05 RX ADMIN — GABAPENTIN 400 MG: 400 CAPSULE ORAL at 13:11

## 2021-08-05 RX ADMIN — LEVETIRACETAM 2000 MG: 500 TABLET, FILM COATED ORAL at 09:00

## 2021-08-05 RX ADMIN — METOPROLOL TARTRATE 25 MG: 25 TABLET ORAL at 09:00

## 2021-08-05 RX ADMIN — KETOROLAC TROMETHAMINE 30 MG: 30 INJECTION, SOLUTION INTRAMUSCULAR; INTRAVENOUS at 13:29

## 2021-08-05 RX ADMIN — INSULIN LISPRO 3 UNITS: 100 INJECTION, SOLUTION INTRAVENOUS; SUBCUTANEOUS at 09:03

## 2021-08-05 RX ADMIN — PANTOPRAZOLE SODIUM 40 MG: 40 TABLET, DELAYED RELEASE ORAL at 09:00

## 2021-08-05 RX ADMIN — ROPINIROLE HYDROCHLORIDE 1 MG: 1 TABLET, FILM COATED ORAL at 13:11

## 2021-08-05 RX ADMIN — LACTULOSE 10 G: 20 SOLUTION ORAL at 08:58

## 2021-08-05 RX ADMIN — INSULIN GLARGINE 50 UNITS: 100 INJECTION, SOLUTION SUBCUTANEOUS at 08:59

## 2021-08-05 RX ADMIN — GABAPENTIN 400 MG: 400 CAPSULE ORAL at 09:00

## 2021-08-05 RX ADMIN — SODIUM CHLORIDE, PRESERVATIVE FREE 10 ML: 5 INJECTION INTRAVENOUS at 09:06

## 2021-08-05 RX ADMIN — DIVALPROEX SODIUM 250 MG: 250 TABLET, DELAYED RELEASE ORAL at 08:59

## 2021-08-05 RX ADMIN — INSULIN LISPRO 8 UNITS: 100 INJECTION, SOLUTION INTRAVENOUS; SUBCUTANEOUS at 12:08

## 2021-08-05 RX ADMIN — LACOSAMIDE 200 MG: 100 TABLET, FILM COATED ORAL at 08:59

## 2021-08-05 RX ADMIN — LACTULOSE 10 G: 20 SOLUTION ORAL at 13:10

## 2021-08-05 RX ADMIN — ENOXAPARIN SODIUM 30 MG: 30 INJECTION SUBCUTANEOUS at 08:59

## 2021-08-05 RX ADMIN — Medication 1 CAPSULE: at 09:05

## 2021-08-05 RX ADMIN — INSULIN LISPRO 3 UNITS: 100 INJECTION, SOLUTION INTRAVENOUS; SUBCUTANEOUS at 12:10

## 2021-08-05 RX ADMIN — ROPINIROLE HYDROCHLORIDE 1 MG: 1 TABLET, FILM COATED ORAL at 09:00

## 2021-08-05 ASSESSMENT — PAIN DESCRIPTION - LOCATION: LOCATION: HEAD

## 2021-08-05 ASSESSMENT — PAIN SCALES - GENERAL
PAINLEVEL_OUTOF10: 4
PAINLEVEL_OUTOF10: 7

## 2021-08-05 ASSESSMENT — PAIN DESCRIPTION - PAIN TYPE: TYPE: ACUTE PAIN

## 2021-08-05 ASSESSMENT — PAIN DESCRIPTION - DESCRIPTORS: DESCRIPTORS: HEADACHE

## 2021-08-05 NOTE — PROGRESS NOTES
LTME 7/30\"    Review of Systems:     Constitutional:  negative for chills, fevers, sweats  Respiratory:  negative for cough, dyspnea on exertion, shortness of breath, wheezing  Cardiovascular:  negative for chest pain, chest pressure/discomfort, lower extremity edema, palpitations  Gastrointestinal:  negative for abdominal pain, constipation, diarrhea, nausea, vomiting  Neurological:  negative for dizziness, headache    Medications: Allergies:     Allergies   Allergen Reactions    Ciprofloxacin-Ciproflox Hcl Er Other (See Comments)     Elevated creatinine    Heparin      Monitor for thrombocytopenia    Metformin Nausea Only     Abdominal pain, diarrhea    Niacin And Related Other (See Comments)     Burning sensation, severe flushing    Tramadol Hcl      Contraindication to seizure medications    Ultram [Tramadol]      Contraindication to seizure medications    Warfarin      Very difficult to regulate in past       Current Meds:   Scheduled Meds:    insulin glargine  50 Units Subcutaneous BID    insulin lispro  3 Units Subcutaneous TID WC    lactulose  10 g Oral TID    enoxaparin  30 mg Subcutaneous BID    divalproex  500 mg Oral Nightly    divalproex  250 mg Oral Daily    doxycycline hyclate  100 mg Oral 2 times per day    metoprolol tartrate  25 mg Oral BID    gabapentin  400 mg Oral 4x Daily    zonisamide  500 mg Oral Nightly    DULoxetine  60 mg Oral Daily    pantoprazole  40 mg Oral BID    levETIRAcetam  2,000 mg Oral BID    rOPINIRole  1 mg Oral TID    insulin lispro  0-12 Units Subcutaneous TID WC    insulin lispro  0-6 Units Subcutaneous Nightly    lacosamide  200 mg Oral BID    lactobacillus  1 capsule Oral Daily    sodium chloride flush  5-40 mL Intravenous 2 times per day     Continuous Infusions:    dextrose      sodium chloride       PRN Meds: LORazepam, glucose, dextrose, glucagon (rDNA), dextrose, sodium chloride flush, sodium chloride, potassium chloride **OR** potassium alternative oral replacement **OR** potassium chloride, magnesium sulfate, ondansetron **OR** ondansetron, polyethylene glycol, acetaminophen **OR** acetaminophen    Data:     Past Medical History:   has a past medical history of Abnormal thyroid biopsy, Acid reflux, Anemia, Anesthesia, Bipolar disorder ([facility] [address] Blood clot in vein, Cancer ([facility] [address] Chest pain, Chronic back pain, Chronic bronchitis ([facility] [address] Colon polyp, Depression, DVT (deep venous thrombosis) ([facility] [address] Esophageal abnormality, Fatty liver disease, nonalcoholic, Furuncle, History of Doppler ultrasound, History of pulmonary embolism, Hx of blood clots, Hyperlipidemia, Hypertension, Intracranial arachnoid cyst, Irritable bowel syndrome, Liver disease, MDRO (multiple drug resistant organisms) resistance, MRSA (methicillin resistant staph aureus) culture positive, Nephrolithiasis, Neuropathy, Pancreatic insufficiency, Positive SANDY (antinuclear antibody), Prolonged emergence from general anesthesia, Restless legs syndrome, Seizures ([facility] [address] Sinus tachycardia, Skull fracture ([facility] [address] Sleep apnea, Systolic dysfunction, and Type II or unspecified type diabetes mellitus without mention of complication, not stated as uncontrolled. Social History:   reports that he has never smoked. He has never used smokeless tobacco. He reports that he does not drink alcohol and does not use drugs.      Family History:   Family History   Problem Relation Age of Onset    Heart Disease Father 61        MI    Stroke Brother     Obesity Brother     Arthritis Mother     Seizures Mother     Obesity Sister     Other Brother         pulmonary embolism    Diabetes Daughter     Seizures Brother        Vitals:  /73   Pulse 68   Temp 98.6 °F (37 °C) (Oral)   Resp 15   Ht 6' 2\" (1.88 m)   Wt 249 lb 1.9 oz (113 kg)   SpO2 92%   BMI 31.99 kg/m²   Temp (24hrs), Av.3 °F (36.8 °C), Min:97.9 °F (36.6 °C), Max:98.6 °F (37 °C)    Recent Labs     21 08/04/21 2234 08/05/21  0742   POCGLU 156* 169* 187* 222*       I/O (24Hr): Intake/Output Summary (Last 24 hours) at 8/5/2021 1159  Last data filed at 8/4/2021 1814  Gross per 24 hour   Intake 1200 ml   Output --   Net 1200 ml       Labs:  Hematology:  Recent Labs     08/03/21  0651 08/04/21  0636 08/05/21  0636   WBC 7.3 6.4 6.2   RBC 4.71 4.49 4.44   HGB 14.1 13.3 13.2   HCT 40.1* 39.4* 38.1*   MCV 85.1 87.8 85.8   MCH 29.9 29.6 29.7   MCHC 35.2* 33.8 34.6   RDW 12.1 12.1 12.0    176 183   MPV 9.9 10.2 10.1     Chemistry:  Recent Labs     08/03/21  0651 08/04/21  0636 08/05/21  0636    139 142   K 3.6* 4.0 3.7   * 107 109*   CO2 21 20 23   GLUCOSE 236* 294* 160*   BUN 11 11 10   CREATININE 0.70 0.70 0.65*   ANIONGAP 12 12 10   LABGLOM >60 >60 >60   GFRAA >60 >60 >60   CALCIUM 8.5* 8.3* 8.3*     Recent Labs     08/03/21  0814 08/03/21  1146 08/04/21  0719 08/04/21  1024 08/04/21  1147 08/04/21  1629 08/04/21  1943 08/04/21 2006 08/04/21 2234 08/05/21  0742   AMMONIA 102*  --   --  70*  --   --   --   --   --   --    POCGLU  --    < >   < >  --  176* 234* 156* 169* 187* 222*    < > = values in this interval not displayed. ABG:  Lab Results   Component Value Date    PH 7.40 06/26/2020    PH 5.5 01/04/2012    PCO2 40 06/26/2020    PO2 71 06/26/2020    HCO3 25 06/26/2020    O2SAT 94 06/26/2020     No results found for: SPECIAL  No results found for: CULTURE    Radiology:  MRI BRAIN WO CONTRAST    Result Date: 8/2/2021  Areas of encephalomalacia and gliosis in the right frontal lobe and medial aspect the cerebellar hemispheres from previous areas of injury or insult. No acute brain parenchymal abnormality.        Physical Examination:        General appearance:  alert, cooperative and no distress  Mental Status:  Slow in response but oriented to person, place and time and normal affect  Lungs:  clear to auscultation bilaterally, normal effort  Heart:  regular rate and rhythm, no murmur  Abdomen:  soft, nontender, nondistended, normal bowel sounds, no masses, hepatomegaly, splenomegaly  Extremities: Right anterior shin ulcerations, left heel wound, wrapped in ACE wrap  Skin:  no gross lesions, rashes, induration    Assessment:        Hospital Problems         Last Modified POA    * (Principal) Acute metabolic encephalopathy 0/4/7125 Yes    Hypertension 7/27/2021 Yes    Type 2 diabetes mellitus with diabetic neuropathy ([address] 7/27/2021 Yes    Neuropathy 7/27/2021 Yes    Hyperammonemia ([address] 8/3/2021 Yes    Bipolar affective disorder ([address] 7/27/2021 Yes    Partial symptomatic epilepsy with complex partial seizures, not intractable, without status epilepticus ([address] 7/27/2021 Yes    Gastroesophageal reflux disease 7/27/2021 Yes    Obesity (BMI 30-39.9) (Chronic) 8/3/2021 Yes    Diabetic leg ulcer ([address] 7/27/2021 Yes    Seizures ([address] 8/3/2021 Yes    Partial symptomatic epilepsy with complex partial seizures, intractable, without status epilepticus ([address] 7/28/2021 Yes    Episodic confusion 7/28/2021 Yes          Plan:        Metabolic encephalopathy multifactorial secondary to hyperammonemia, right lower extremity MRSA cellulitis - continue 100 mg p.o. twice daily doxycycline till 8/12, wound care. On lactulose for elevated ammonia.     Left heel wound- s/p I&d  By podiatry at [facility], continue wound care, podiatry signed off    Hyperammonemia-ammonia improved, discussed with neurology regarding valproic acid,dose decreased, continue lactulose, titrate to 3 bowel movements in a day    Seizure history-AED as per neurology, patient to follow-up with own neurologist outpatient, discuss depakote with the psychiatrist    Type 2 diabetes mellitus with neuropathy -A1c 8.3, continue basal insulin with sliding scale, monitor for hypoglycemia, continue Neurontin at dose of 400 mg 4 times daily, decrease from 600 mg    Restless leg syndrome-on Requip    history of DVT, PE s/p vena cava filter placement    Bipolar disorder- stable    Obesity- continue lifestyle modifications    Dvt prop- on lovenox, platelets normal    continue speech therapy    Plan to discharge today    Bebe Calix MD  8/5/2021  11:59 AM

## 2021-08-05 NOTE — PROGRESS NOTES
[address]  Speech Language Pathology    Date: 8/5/2021  Patient Name: Alisha Sexton  YOB: 1970   AGE: 46 y.o. MRN: [id number]        Patient Not Available for Speech Therapy     Due to:  [] Testing  [] Hemodialysis  [] Cancelled by RN  [] Surgery   [] Intubation/Sedation/Pain Medication  [] Medical instability  [x] Other:Pt refused ST, reports that he is being d/c today and does not want to participate in therapy today. Next scheduled treatment: 8/6/2021    Completed by:  Kerwin Crawford  Clinician    Cosigned By: Selina Fabian S.CCC/SLP

## 2021-08-05 NOTE — CARE COORDINATION
Transitional Planning     Spoke with Linda at [facility]; able to accept patient at anytime. Spoke with Duong Carter at [facility]; able to accept patient after 1600. CM spoke with patient's wife regarding above. Notified patient's wife that she can choose from either facility. Patient's wife requesting to speak with patient and discuss options and will call back with decision. 5185 Received call back from patient's wife, Maeve Eid; requesting patient to go to [facility]. Nikky. Transportation set via [address] to [address] [place] [address]. Room # [phone] Report# [phone]    8180 Received call from patient's wife stating she did not want patient to go to [facility]. Alis's and that she was misinformed regarding [facility]. She states that [facility] said that patient meets criteria regarding the 60/40. Explained to her that CM faxed H&P and progress notes, which states patient's diagnoses. Patient's wife stated that she was told by [facility] Manager that patient can be admitted at anytime. Explained to her that CM spoke with Duong Carter with Admissions at [facility] and they will not accept patient until 1600. Asked patient's wife to have  me if manager states otherwise. Transportation changed to [facility]; requested pickup after 1500. Pending confirmed ETA. Notified wife that as soon as CM has ETA time, will update wife. 1130 SUN spoke with Duong Carter regarding transportation time. Duong Carter stated she spoke with patient's wife and notified her that patient cannot be admitted until after 1600. Room # 9153  Report # [phone]  Fax # [phone]    [phone] AVS faxed to [facility].     [facility] [employer]; Caitlin Meza. Updated patient's wife of discharge time.

## 2021-08-05 NOTE — PROGRESS NOTES
[facility] - Progress Note  Today's Date and Time: 8/5/2021, 10:13 AM    Impression :   · MRSA right leg ulcerations  · Left heel wound  · Breakthrough seizure with history of epilepsy  · Diabetes mellitus type 2  · Essential hypertension  · Hyperammonemia  · Altered mentation    Recommendations:   · D/C Vancomycin   · Wound care  · Doxycycline 100 mg po BID x 10 days. Stop date 8-12-21      Medical Decision Making/Summary/Discussion:8/5/2021     ·   Infection Control Recommendations   · [place] Precautions  · Contact Isolation     Antimicrobial Stewardship Recommendations     · Simplification of therapy  · Targeted therapy    Coordination of Outpatient Care:   · Estimated Length of IV antimicrobials: TBD  · Patient will need Midline Catheter Insertion: No  · Patient will need PICC line Insertion:  · Patient will need: Home IV , [place],  SNF,  LTAC:TBD  · Patient will need outpatient wound care:Yes    Chief complaint/reason for consultation:   · Cellulitis, foot abscess growing MRSA      History of Present Illness:   Susana Ahsley is a 46y.o.-year-old male who was initially admitted on 7/27/2021. Patient seen at the request of Dr. Trujillo Lat:    Initially, this patient with a history of complex seizure disorder and diabetes mellitus, presented to [facility] on 7/20/2021 with 2 ulcerations on his right leg and one on his left heel. He states they had first appeared approximately 3 weeks prior, but have gotten worse with foul-smelling discharge and severe pain. Wound cultures of the right leg yielded MRSA. While at [facility], the patient underwent an I&D of the left heel wound per Podiatry and was treated for the MRSA positive right leg wounds with Vanco  per ID physician, Dr. Gaurang Andres. One out of 2 blood cultures was positive for coag negative staph but this was considered as a contaminant.     The patient was doing well, but experienced a change in mentation and an EEG showed abnormal changes. He was going to be transferred to [place], where he follows up with his neurologist, but they had no beds available. He was then transferred to [address] [place] [address] for further neurology work-up. Per Podiatry at [facility] on 7/20/21   Vascular: Dorsalis pedis and posterior tibial pulses are palpable to right foot, DP is palpable to left. PT biphasic on doppler exam left. Skin temperature is warm  to cool from proximal tibial tuberosity to distal digits. CFT brisk to exposed digits. Edema noted to left heel and ankle, non-pitting. Hair growth present to bilateral feet. Quality of skin is wnl for patient age.      Dermatologic: Open lesion x2 noted to anterior aspect of right leg. The proximal lesion measures 2cm in diameter and displays a necrotic base with orange exudate present. The more distal lesion measures 3.5 cm in diameter with a necrotic base, serosanquinous and purulent drainage noted. To both lesions, negative probe to bone, negative malodor. Puncture wound with underlying eschar noted beneath superficial skin to left heel, plantar aspect. Post-debridement: left heel expresses 5cc of purulent drainage, probes to calcaneus. Extensive malodor present.      Neurovascular: Light touch sensation grossly absent to bilateral feet, restored at mid calf.      Musculoskeletal: Muscle strength 5/5 for all plantarflexors, dorsiflexors, inverters and everters to RLE, 4/5 for all quadrants LLE. Exquisite pain to palpation of left heel, and left ankle to the medial and posterior aspects. Cavus foot type noted bilateral. Semi-rigid contractures of digits 1-5 bilateral noted. No amputations noted bilateral.       RLE   7/20/21       LLE post-debridement        7/24/21         CURRENT EVALUATION 8/5/2021    Patient evaluated at bedside at time of rounds. Patient is more alert than yesterday.   Afebrile  Vital signs stable  Patient states no significant change on LLE pain. Patient will be discharged today. Wound care and oral antimicrobials to be continued    EEG completed 07/28/21, showed abnormal bihemispheric dysfunction, no active seizures. 07/30/21 Ammonia 142 --> 53  On lactulose and decreased dosages of Depakote      His liver profile from the 20th was normal.      Labs, X rays reviewed: 8/5/2021    BUN:8 --> 6 --> 6-->10->11-> 11->10  Cr: 0.70->0.64->0.56->0.60-> 0.70->0.65  CRP: 3.46     WBC: 5.9->7.4->7.4->7.6->7.9->7.3-> 6.4->6.2  Hb:14.8->12.4->14.0->14.1->14.1->14.1-> 13.3->13.2  Plat: 253->228->263->320->228-> 176->183    Cultures:  Urine:  ·   Blood:  ·   Sputum :  ·   Wound:  · 7/20/2021: MRSA    Images:        7/31/21 7/27/2021    Discussed with patient, RN, family. I have personally reviewed the past medical history, past surgical history, medications, social history, and family history, and I have updated the database accordingly.   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 Vitaliy Nava (due to enlarged spleen)    Anesthesia     seizures with brain surgery due to blood sugar got really high    [facility] disorder ([facility]     Blood clot in vein 04/07/2016    [facility]     Cancer [facility]) 04/2019    thyroid    Chest pain     previously seeing Brinda Rater cardiologist, now seeing Dr. Latonya Le (LAD bridging on cath 4/2016)    Chronic back pain     Chronic bronchitis ([facility]     Dr. Naila George Colon polyp 08/30/2016    Depression     seeing Eula Scales DVT (deep venous thrombosis) ([facility] [id number]    not on Coumadin due to unable to regulate - Dr. Marianela Cummins - no etiology found per patient    Esophageal abnormality     nodule     Fatty liver disease, nonalcoholic     [facility]    Furuncle     legs    History of Doppler ultrasound 05/29/2011    No hemodynamically DIAGNOSTIC      HERNIA REPAIR Right 1996    [facility]Dr. Yoav Ktahleen    INGUINAL HERNIA REPAIR Right 09/15/2016    Robotic assisted    KNEE ARTHROSCOPY Right 2016    KNEE SURGERY Left 2007    acl and debrided twice    OTHER SURGICAL HISTORY  5-31-11    Tilt table was associated w/ nonspecific symptoms or nausea, otherwise no significant dizziness or syncope. No significant hemodynamic changes. Otherwise, unremarkable tilt table test after 30 minutes of tilting.  OTHER SURGICAL HISTORY  01/20/2017    RIGHT SHOULDER ARTHROSCOPY, OPEN STAN, OPEN ACROMIOPLASTY, BICEP TENDESIS, RIGHT CARPAL TUNNEL RELEASE    SHOULDER SURGERY Right 01/2007    THYROID LOBECTOMY N/A 1/15/2021    THYROID LOBECTOMY, UVULOPALATOPHARYNGOPLAST LAOP performed by Cammie Sandoval MD at [facility] ECHOCARDIOGRAM  3-05-11    LV size and systolic function normal. EF 55-65%.      UPPER GASTROINTESTINAL ENDOSCOPY  2012    UPPER GASTROINTESTINAL ENDOSCOPY  2016    Dr. Ke Viera Left 10/22/2019    EGD DILATION SAVORY performed by Clau Ordaz MD [facility]    UPPER GASTROINTESTINAL ENDOSCOPY Left 10/22/2019    EGD BIOPSY performed by Clau Ordaz MD at [address]  2007       Medications:      insulin glargine  50 Units Subcutaneous BID    insulin lispro  3 Units Subcutaneous TID WC    lactulose  10 g Oral TID    enoxaparin  30 mg Subcutaneous BID    divalproex  500 mg Oral Nightly    divalproex  250 mg Oral Daily    doxycycline hyclate  100 mg Oral 2 times per day    metoprolol tartrate  25 mg Oral BID    gabapentin  400 mg Oral 4x Daily    zonisamide  500 mg Oral Nightly    DULoxetine  60 mg Oral Daily    pantoprazole  40 mg Oral BID    levETIRAcetam  2,000 mg Oral BID    rOPINIRole  1 mg Oral TID    insulin lispro  0-12 Units Subcutaneous TID WC    insulin lispro  0-6 Units Subcutaneous Nightly    lacosamide 200 mg Oral BID    lactobacillus  1 capsule Oral Daily    sodium chloride flush  5-40 mL Intravenous 2 times per day       Social History:     Social History     Socioeconomic History    Marital status:      Spouse name: Not on file    Number of children: 3    Years of education: Not on file    Highest education level: Not on file   Occupational History    Occupation: Disability since 2011   Tobacco Use    Smoking status: Never Smoker    Smokeless tobacco: Never Used   Vaping Use    Vaping Use: Never used   Substance and Sexual Activity    Alcohol use: No     Alcohol/week: 0.0 standard drinks    Drug use: No    Sexual activity: Not on file   Other Topics Concern    Not on file   Social History Narrative    Not on file     Social Determinants of Health     Financial Resource Strain: Medium Risk    Difficulty of Paying Living Expenses: Somewhat hard   Food Insecurity: No Food Insecurity    Worried About Running Out of Food in the Last Year: Never true    Jeffry of Food in the Last Year: Never true   Transportation Needs:     Lack of Transportation (Medical):      Lack of Transportation (Non-Medical):    Physical Activity:     Days of Exercise per Week:     Minutes of Exercise per Session:    Stress:     Feeling of Stress :    Social Connections:     Frequency of Communication with Friends and Family:     Frequency of Social Gatherings with Friends and Family:     Attends Sikh Services:     Active Member of Clubs or Organizations:     Attends Club or Organization Meetings:     Marital Status:    Intimate Partner Violence:     Fear of Current or Ex-Partner:     Emotionally Abused:     Physically Abused:     Sexually Abused:        Family History:     Family History   Problem Relation Age of Onset    Heart Disease Father 61        MI    Stroke Brother     Obesity Brother     Arthritis Mother     Seizures Mother     Obesity Sister     Other Brother         pulmonary embolism    Diabetes Daughter     Seizures Brother         Allergies:   Ciprofloxacin-ciproflox hcl er, Heparin, Metformin, Niacin and related, Tramadol hcl, Ultram [tramadol], and Warfarin     Review of Systems:   Unable to assess as patient is nonverbal at this time    Constitutional: No fevers or chills. No systemic complaints  Head: No headaches  Eyes: No double vision or blurry vision. No conjunctival inflammation. ENT: No sore throat or runny nose. . No hearing loss, tinnitus or vertigo. Cardiovascular: No chest pain or palpitations. No shortness of breath. No EWING  Lung: No shortness of breath or cough. No sputum production  Abdomen: No nausea, vomiting, diarrhea, or abdominal pain. Raine Graven No cramps. Genitourinary: No increased urinary frequency, or dysuria. No hematuria. No suprapubic or CVA pain  Musculoskeletal: No muscle aches or pains. No joint effusions, swelling or deformities  Hematologic: No bleeding or bruising. Neurologic: No headache, weakness, numbness, or tingling. Integument: No rash, no ulcers. Psychiatric: No depression. Endocrine: No polyuria, no polydipsia, no polyphagia. Physical Examination :     Patient Vitals for the past 8 hrs:   BP Temp Temp src Pulse Resp SpO2 Weight   08/05/21 0741 120/73 98.6 °F (37 °C) Oral 68 15 92 % --   08/05/21 0524 -- -- -- -- -- -- 249 lb 1.9 oz (113 kg)   08/05/21 0315 128/80 98.3 °F (36.8 °C) Oral 67 12 91 % --     General Appearance: Awake, alert, and in no apparent distress-nonverbal  Head:  Normocephalic, no trauma  Eyes: Pupils equal, round, reactive to light and accommodation; extraocular movements intact; sclera anicteric; conjunctivae pink. No embolic phenomena. ENT: Oropharynx clear, without erythema, exudate, or thrush. No tenderness of sinuses. Mouth/throat: mucosa pink and moist. No lesions. Dentition in good repair. Neck:Supple, without lymphadenopathy. Thyroid normal, No bruits.   Pulmonary/Chest: Clear to auscultation, without wheezes, rales, or rhonchi. No dullness to percussion. Cardiovascular: Regular rate and rhythm without murmurs, rubs, or gallops. Abdomen: Soft, non tender. Bowel sounds normal. No organomegaly  All four Extremities: No cyanosis, clubbing, edema, or effusions. Neurologic: No gross sensory or motor deficits. Skin: 2 healing ulcerations on the right lower leg and 1 left foot wound status post I&D sutures clean dry intact  Medical Decision Making -Laboratory:   I have independently reviewed/ordered the following labs:    CBC with Differential:   Recent Labs     08/04/21 0636 08/05/21 0636   WBC 6.4 6.2   HGB 13.3 13.2   HCT 39.4* 38.1*    183   LYMPHOPCT 34 37   MONOPCT 9 9     BMP:   Recent Labs     08/04/21  0636 08/05/21 0636    142   K 4.0 3.7    109*   CO2 20 23   BUN 11 10   CREATININE 0.70 0.65*     Hepatic Function Panel:   No results for input(s): PROT, LABALBU, BILIDIR, IBILI, BILITOT, ALKPHOS, ALT, AST in the last 72 hours. No results for input(s): RPR in the last 72 hours. No results for input(s): HIV in the last 72 hours. No results for input(s): BC in the last 72 hours. Lab Results   Component Value Date    MUCUS Present 10/17/2015    PH 7.40 06/26/2020    PH 5.5 01/04/2012    RBC 4.44 08/05/2021    RBC 4.93 03/26/2012    WBC 6.2 08/05/2021    YEAST NONE SEEN 08/12/2019     Lab Results   Component Value Date    CREATININE 0.65 08/05/2021    GLUCOSE 160 08/05/2021    GLUCOSE 189 11/12/2015       Medical Decision Making-Imaging:   XR FOOT LEFT (MIN 3 VIEWS)     Result Date: 7/20/2021  PROCEDURE: XR FOOT LEFT (MIN 3 VIEWS) CLINICAL INFORMATION: pain COMPARISON: No prior study. TECHNIQUE: 4 views of the foot were obtained. FINDINGS: No acute fracture or dislocation is seen. There is no soft tissue swelling. There is a moderate-sized plantar calcaneal spur.      Plantar calcaneal spur. Otherwise normal left foot. **This report has been created using voice recognition software.   It may contain minor errors which are inherent in voice recognition technology. ** Final report electronically signed by Dr. Yamil Suazo on 7/20/2021 3:57 PM     CT TIBIA FIBULA LEFT WO CONTRAST     Result Date: 7/20/2021  PROCEDURE: NONCONTRAST CT LEFT TIBIA/FIBULA: CLINICAL INFORMATION: Infection/ abscess TECHNIQUE: Multiple axial 3 mm images of the left tibia/fibula were obtained without administration of intravenous contrast material. Computer generated sagittal and coronal images of the left tibia/fibula were also reconstructed. ALL CT SCANS AT THIS FACILITY use dose modulation, iterative reconstruction, and/or weight-based dosing when appropriate to reduce radiation dose to as low as reasonably achievable. FINDINGS: There are bony defects in the distal femur and proximal tibia conjunction with prior anterior crucial ligament repair. No acute fracture is seen. Appears be a small bone cyst in the lateral malleolus. There is mild degenerative spurring of the distal femur. Note that there appear to be multiple superficial varicosities in the distal left calf      1. Degenerative changes of the left knee. Postsurgical changes left knee. Multiple varicosities this left calf. 2. Study otherwise unremarkable. No tibial or fibular fracture is seen. **This report has been created using voice recognition software. It may contain minor errors which are inherent in voice recognition technology. ** Final report electronically signed by Dr. Yamil Suazo on 7/20/2021 9:07 PM    8/1/21 brain MRI  Area of encephalomalacia and gliosis in the right frontal lobe and medial aspect the cerebellar hemispheres from previous areas of injury or insult.  No acute brain parenchymal abnormality.        Medical Decision [facility]ns[facility]:     7/23/2021  2:48 PM - AndreaJerman Incoming Lab Results From [facility]    Specimen Information: Heel        Component Collected Lab   Anaerobic Culture 07/20/2021  6:15  [facility]   Culture yielded heavy mixed growth which included anaerobic gram negative bacilli and anaerobic gram positive cocci. If a true mixed aerobic and anaerobic infection is suspected, then broad spectrum empiric antibiotic therapy is indicated and should include coverage for anaerobic organisms. Gram Stain Result 07/20/2021  6:15  [facility]   Few segmented neutrophils observed. Rare epithelial cells observed. Many gram positive cocci occurring singly and in pairs. Organism Abnormal  07/20/2021  6:15  [facility]   Staphylococcus aureus    Aerobic Culture 07/20/2021  6:15  [facility]   light growth This is a MRSA (Methicillin Resistant Staphylococcus aureus)isolate. Isolates of MRSA (ORSA) Methicillin (Oxacillin) Resistant Staphylococcus aureus (coagulase positive) require patient be placed in CONTACT isolation. Methicillin(Oxacillin)resistant strains of staphylococci (MRSA)or(MRSE)should be considered resistant to all classes of cephalosporins, penems and beta-lactams. In the treatment of gram positive infections, GENTAMICIN should be CONSIDERED a SYNERGYSTIC agent ONLY. Ciprofloxacin and Levofloxacin, regardless of in vitro sensitivity, should not be used for staphylococcal infections other than uncomplicated lower UTIs. Testing Performed By    [address] Name Director Address Valid Date Range   [facility] - [address] Ryder Sung MD [address] [place] [address] 08/30/17 0855-Present   Narrative  Performed by: [facility]  Source: heel       Site: swab + tissue left          Current Antibiotics: Vancomycin   Susceptibility    Staphylococcus aureus (1)    Antibiotic Interpretation JODI Status   gentamicin Sensitive <=0.5 mcg/mL Final   ICR (D test) Negative Neg  mcg/mL Final    (Dtest) ICR- inducible clinda resistance    If +, then inducible erm gene       present.  Clindamycin may be       effective in some patients.       oxacillin Resistant >=4 mcg/mL Final   clindamycin Sensitive <=0.25 mcg/mL Final   trimethoprim-sulfamethoxazole Sensitive <=10 mcg/mL Final   vancomycin Sensitive <=0.5 mcg/mL Final   tetracycline Sensitive <=1 mcg/mL Final   Lab and Collection    Culture, Anaerobic and Aerobic - 7/20/2021  Result History    Culture, Anaerobic and Aerobic on 7/23/2021 - Result Edited     MRI 8/1/21  Impression   Areas of encephalomalacia and gliosis in the right frontal lobe and medial   aspect the cerebellar hemispheres from previous areas of injury or insult. No acute brain parenchymal abnormality.           Medical Decision Making-Other:     Note:  · Labs, medications, radiologic studies were reviewed with personal review of films  · Large amounts of data were reviewed  · Discussed with nursing Staff, Discharge planner  · Infection Control and Prevention measures reviewed  · All prior entries were reviewed  · Administer medications as ordered  · Prognosis: Fair  · Discharge planning reviewed    Thank you for allowing us to participate in the care of this patient. Please call with questions. Electronically signed by Sravan Monaco DPM on 8/5/2021 at 10:13 AM    ATTESTATION:    I have discussed the case, including pertinent history and exam findings with the residents and students. I have seen and examined the patient and the key elements of the encounter have been performed by me. I was present when the student obtained his information or examined the patient. I have reviewed the laboratory data, other diagnostic studies and discussed them with the residents. I have updated the medical record where necessary. I agree with the assessment, plan and orders as documented by the resident/ student.     Iván Broussard MD.

## 2021-08-05 NOTE — PROGRESS NOTES
[facility] [place] [facility]. All questions answered. Patient belongings gathered and IV access removed with dressing applied.    Electronically signed by Jessica Sow RN on 8/5/2021 at 3:44 PM

## 2021-08-05 NOTE — PLAN OF CARE
Problem: Nutritional:  Goal: Nutritional status will improve  Description: Nutritional status will improve  8/5/2021 0427 by Higinio Maria RN  Outcome: Met This Shift     Problem: Physical Regulation:  Goal: Will remain free from infection  Description: Will remain free from infection  8/5/2021 0427 by Higinio Maria RN  Outcome: Met This Shift     Problem: Physical Regulation:  Goal: Ability to maintain vital signs within normal range will improve  Description: Ability to maintain vital signs within normal range will improve  8/5/2021 0427 by Higinio Maria RN  Outcome: Met This Shift     Problem: Physical Regulation:  Goal: Signs of adequate cerebral perfusion will increase  Description: Signs of adequate cerebral perfusion will increase  8/5/2021 0427 by Higinio Maria RN  Outcome: Met This Shift     Problem: Physical Regulation:  Goal: Ability to maintain a stable neurologic state will improve  Description: Ability to maintain a stable neurologic state will improve  8/5/2021 0427 by Higinio Maria RN  Outcome: Met This Shift     Problem: Skin Integrity:  Goal: Demonstration of wound healing without infection will improve  Description: Demonstration of wound healing without infection will improve  8/5/2021 0427 by iHginio Maria RN  Outcome: Met This Shift     Problem: Skin Integrity:  Goal: Will show no infection signs and symptoms  Description: Will show no infection signs and symptoms  8/5/2021 0427 by Higinio Maria RN  Outcome: Met This Shift     Problem: Skin Integrity:  Goal: Absence of new skin breakdown  Description: Absence of new skin breakdown  8/5/2021 0427 by Higiino Maria RN  Outcome: Met This Shift     Problem: Coping:  Goal: Ability to cope will improve  Description: Ability to cope will improve  8/5/2021 0427 by Higinio Maria RN  Outcome: Met This Shift     Problem: Coping:  Goal: Ability to identify appropriate support needs will improve  Description: Ability to identify appropriate support needs will improve  8/5/2021 0427 by Kenny Ramírez RN  Outcome: Met This Shift     Problem: Health Behavior:  Goal: Ability to manage health-related needs will improve  Description: Ability to manage health-related needs will improve  8/5/2021 0427 by Kenny Ramírez RN  Outcome: Met This Shift     Problem: Safety:  Goal: Ability to remain free from injury will improve  Description: Ability to remain free from injury will improve  8/5/2021 0427 by Kenny Ramírez RN  Outcome: Met This Shift     Problem: Self-Concept:  Goal: Level of anxiety will decrease  Description: Level of anxiety will decrease  8/5/2021 0427 by Kenny Ramírez RN  Outcome: Met This Shift     Problem: Self-Concept:  Goal: Ability to verbalize feelings about condition will improve  Description: Ability to verbalize feelings about condition will improve  8/5/2021 0427 by Kenny Ramírez RN  Outcome: Met This Shift     Problem: Falls - Risk of:  Goal: Will remain free from falls  Description: Will remain free from falls  8/5/2021 0427 by Kenny Ramírez RN  Outcome: Met This Shift     Problem: Falls - Risk of:  Goal: Absence of physical injury  Description: Absence of physical injury  8/5/2021 0427 by Kenny Ramírez RN  Outcome: Met This Shift     Problem: Musculor/Skeletal Functional Status  Goal: Highest potential functional level  8/5/2021 0427 by Kenny Ramírez RN  Outcome: Met This Shift     Problem: Pain:  Goal: Pain level will decrease  Description: Pain level will decrease  8/5/2021 0427 by Kenny Ramírez RN  Outcome: Met This Shift     Problem: Pain:  Goal: Control of acute pain  Description: Control of acute pain  8/5/2021 0427 by Kenny Ramírez RN  Outcome: Met This Shift     Problem: Pain:  Goal: Control of chronic pain  Description: Control of chronic pain  8/5/2021 0427 by Kenny Ramírez RN  Outcome: Met This Shift     Problem: Confusion - Acute:  Goal: Absence of continued neurological deterioration signs and symptoms  Description: Absence of continued neurological deterioration signs and symptoms  8/5/2021 0427 by Mila Herman RN  Outcome: Met This Shift     Problem: Confusion - Acute:  Goal: Mental status will be restored to baseline  Description: Mental status will be restored to baseline  8/5/2021 0427 by Mila Herman RN  Outcome: Met This Shift     Problem: Discharge Planning:  Goal: Ability to perform activities of daily living will improve  Description: Ability to perform activities of daily living will improve  8/5/2021 0427 by Mila Herman RN  Outcome: Met This Shift     Problem: Discharge Planning:  Goal: Participates in care planning  Description: Participates in care planning  8/5/2021 0427 by Mila Herman RN  Outcome: Met This Shift     Problem: Injury - Risk of, Physical Injury:  Goal: Will remain free from falls  Description: Will remain free from falls  8/5/2021 0427 by Mila Herman RN  Outcome: Met This Shift     Problem: Injury - Risk of, Physical Injury:  Goal: Absence of physical injury  Description: Absence of physical injury  8/5/2021 0427 by Mila Herman RN  Outcome: Met This Shift     Problem: Mood - Altered:  Goal: Mood stable  Description: Mood stable  8/5/2021 0427 by Mila Herman RN  Outcome: Met This Shift     Problem: Mood - Altered:  Goal: Absence of abusive behavior  Description: Absence of abusive behavior  8/5/2021 0427 by Mila Herman RN  Outcome: Met This Shift     Problem: Mood - Altered:  Goal: Verbalizations of feeling emotionally comfortable while being cared for will increase  Description: Verbalizations of feeling emotionally comfortable while being cared for will increase  8/5/2021 0427 by Mila Herman RN  Outcome: Met This Shift     Problem: Psychomotor Activity - Altered:  Goal: Absence of psychomotor disturbance signs and symptoms  Description: Absence of psychomotor disturbance signs and symptoms  8/5/2021 0427 by Alex Love RN  Outcome: Met This Shift     Problem: Sensory Perception - Impaired:  Goal: Demonstrations of improved sensory functioning will increase  Description: Demonstrations of improved sensory functioning will increase  8/5/2021 0427 by Alex Love RN  Outcome: Met This Shift     Problem: Sensory Perception - Impaired:  Goal: Decrease in sensory misperception frequency  Description: Decrease in sensory misperception frequency  8/5/2021 0427 by Alex Love RN  Outcome: Met This Shift     Problem: Sensory Perception - Impaired:  Goal: Able to refrain from responding to false sensory perceptions  Description: Able to refrain from responding to false sensory perceptions  8/5/2021 0427 by Alex Love RN  Outcome: Met This Shift     Problem: Sensory Perception - Impaired:  Goal: Demonstrates accurate environmental perceptions  Description: Demonstrates accurate environmental perceptions  8/5/2021 0427 by Alex Love RN  Outcome: Met This Shift     Problem: Sensory Perception - Impaired:  Goal: Able to distinguish between reality-based and nonreality-based thinking  Description: Able to distinguish between reality-based and nonreality-based thinking  8/5/2021 0427 by Alex Love RN  Outcome: Met This Shift     Problem: Sensory Perception - Impaired:  Goal: Able to interrupt nonreality-based thinking  Description: Able to interrupt nonreality-based thinking  8/5/2021 0427 by Alex Love RN  Outcome: Met This Shift     Problem: Sleep Pattern Disturbance:  Goal: Appears well-rested  Description: Appears well-rested  8/5/2021 0427 by Alex Love RN  Outcome: Met This Shift     Problem: Nutrition  Goal: Optimal nutrition therapy  8/5/2021 0427 by Alex Love RN  Outcome: Met This Shift

## 2021-08-23 ENCOUNTER — NURSE ONLY (OUTPATIENT)
Dept: LAB | Age: 51
End: 2021-08-23

## 2021-08-23 ENCOUNTER — TELEPHONE (OUTPATIENT)
Dept: INTERNAL MEDICINE CLINIC | Age: 51
End: 2021-08-23

## 2021-08-23 ENCOUNTER — OFFICE VISIT (OUTPATIENT)
Dept: INTERNAL MEDICINE CLINIC | Age: 51
End: 2021-08-23
Payer: MEDICARE

## 2021-08-23 VITALS
TEMPERATURE: 98.4 F | SYSTOLIC BLOOD PRESSURE: 110 MMHG | DIASTOLIC BLOOD PRESSURE: 72 MMHG | HEART RATE: 74 BPM | HEIGHT: 74 IN | BODY MASS INDEX: 31.97 KG/M2

## 2021-08-23 DIAGNOSIS — I25.118 ATHEROSCLEROTIC HEART DISEASE OF NATIVE CORONARY ARTERY WITH OTHER FORMS OF ANGINA PECTORIS (HCC): ICD-10-CM

## 2021-08-23 DIAGNOSIS — R56.9 SEIZURES (HCC): ICD-10-CM

## 2021-08-23 DIAGNOSIS — E11.42 TYPE 2 DIABETES MELLITUS WITH DIABETIC POLYNEUROPATHY, WITH LONG-TERM CURRENT USE OF INSULIN (HCC): ICD-10-CM

## 2021-08-23 DIAGNOSIS — E72.20 HYPERAMMONEMIA (HCC): ICD-10-CM

## 2021-08-23 DIAGNOSIS — Z79.4 TYPE 2 DIABETES MELLITUS WITH DIABETIC POLYNEUROPATHY, WITH LONG-TERM CURRENT USE OF INSULIN (HCC): ICD-10-CM

## 2021-08-23 DIAGNOSIS — E11.65 TYPE 2 DIABETES MELLITUS WITH HYPERGLYCEMIA, WITH LONG-TERM CURRENT USE OF INSULIN (HCC): Primary | ICD-10-CM

## 2021-08-23 DIAGNOSIS — Z51.5 END OF LIFE CARE: ICD-10-CM

## 2021-08-23 DIAGNOSIS — Z79.4 TYPE 2 DIABETES MELLITUS WITH HYPERGLYCEMIA, WITH LONG-TERM CURRENT USE OF INSULIN (HCC): Primary | ICD-10-CM

## 2021-08-23 DIAGNOSIS — R10.9 CHRONIC ABDOMINAL PAIN: ICD-10-CM

## 2021-08-23 DIAGNOSIS — R00.0 SINUS TACHYCARDIA: ICD-10-CM

## 2021-08-23 DIAGNOSIS — G89.29 CHRONIC ABDOMINAL PAIN: ICD-10-CM

## 2021-08-23 DIAGNOSIS — I10 PRIMARY HYPERTENSION: ICD-10-CM

## 2021-08-23 DIAGNOSIS — G93.40 ENCEPHALOPATHY: ICD-10-CM

## 2021-08-23 DIAGNOSIS — R11.0 NAUSEA: ICD-10-CM

## 2021-08-23 LAB
ALBUMIN SERPL-MCNC: 4.8 G/DL (ref 3.5–5.1)
ALP BLD-CCNC: 69 U/L (ref 38–126)
ALT SERPL-CCNC: 24 U/L (ref 11–66)
AMMONIA: 43 UMOL/L (ref 11–60)
ANION GAP SERPL CALCULATED.3IONS-SCNC: 8 MEQ/L (ref 8–16)
AST SERPL-CCNC: 13 U/L (ref 5–40)
BASOPHILS # BLD: 0.6 %
BASOPHILS ABSOLUTE: 0 THOU/MM3 (ref 0–0.1)
BILIRUB SERPL-MCNC: 0.7 MG/DL (ref 0.3–1.2)
BUN BLDV-MCNC: 14 MG/DL (ref 7–22)
CALCIUM SERPL-MCNC: 9.6 MG/DL (ref 8.5–10.5)
CHLORIDE BLD-SCNC: 106 MEQ/L (ref 98–111)
CO2: 28 MEQ/L (ref 23–33)
CREAT SERPL-MCNC: 0.8 MG/DL (ref 0.4–1.2)
EOSINOPHIL # BLD: 4.9 %
EOSINOPHILS ABSOLUTE: 0.3 THOU/MM3 (ref 0–0.4)
ERYTHROCYTE [DISTWIDTH] IN BLOOD BY AUTOMATED COUNT: 13.3 % (ref 11.5–14.5)
ERYTHROCYTE [DISTWIDTH] IN BLOOD BY AUTOMATED COUNT: 43.4 FL (ref 35–45)
GFR SERPL CREATININE-BSD FRML MDRD: > 90 ML/MIN/1.73M2
GLUCOSE BLD-MCNC: 161 MG/DL (ref 70–108)
HCT VFR BLD CALC: 48.3 % (ref 42–52)
HEMOGLOBIN: 16.3 GM/DL (ref 14–18)
IMMATURE GRANS (ABS): 0.05 THOU/MM3 (ref 0–0.07)
IMMATURE GRANULOCYTES: 0.7 %
LYMPHOCYTES # BLD: 29.6 %
LYMPHOCYTES ABSOLUTE: 2 THOU/MM3 (ref 1–4.8)
MCH RBC QN AUTO: 30.6 PG (ref 26–33)
MCHC RBC AUTO-ENTMCNC: 33.7 GM/DL (ref 32.2–35.5)
MCV RBC AUTO: 90.8 FL (ref 80–94)
MONOCYTES # BLD: 6.7 %
MONOCYTES ABSOLUTE: 0.4 THOU/MM3 (ref 0.4–1.3)
NUCLEATED RED BLOOD CELLS: 0 /100 WBC
PLATELET # BLD: 184 THOU/MM3 (ref 130–400)
PMV BLD AUTO: 10.5 FL (ref 9.4–12.4)
POTASSIUM SERPL-SCNC: 4.9 MEQ/L (ref 3.5–5.2)
RBC # BLD: 5.32 MILL/MM3 (ref 4.7–6.1)
SEG NEUTROPHILS: 57.5 %
SEGMENTED NEUTROPHILS ABSOLUTE COUNT: 3.9 THOU/MM3 (ref 1.8–7.7)
SODIUM BLD-SCNC: 142 MEQ/L (ref 135–145)
TOTAL PROTEIN: 7.4 G/DL (ref 6.1–8)
VALPROIC ACID LEVEL: 32.3 UG/ML (ref 50–100)
WBC # BLD: 6.7 THOU/MM3 (ref 4.8–10.8)

## 2021-08-23 PROCEDURE — 2022F DILAT RTA XM EVC RTNOPTHY: CPT | Performed by: INTERNAL MEDICINE

## 2021-08-23 PROCEDURE — G8417 CALC BMI ABV UP PARAM F/U: HCPCS | Performed by: INTERNAL MEDICINE

## 2021-08-23 PROCEDURE — 1111F DSCHRG MED/CURRENT MED MERGE: CPT | Performed by: INTERNAL MEDICINE

## 2021-08-23 PROCEDURE — G8427 DOCREV CUR MEDS BY ELIG CLIN: HCPCS | Performed by: INTERNAL MEDICINE

## 2021-08-23 PROCEDURE — 1036F TOBACCO NON-USER: CPT | Performed by: INTERNAL MEDICINE

## 2021-08-23 PROCEDURE — 99214 OFFICE O/P EST MOD 30 MIN: CPT | Performed by: INTERNAL MEDICINE

## 2021-08-23 PROCEDURE — 3017F COLORECTAL CA SCREEN DOC REV: CPT | Performed by: INTERNAL MEDICINE

## 2021-08-23 PROCEDURE — 3051F HG A1C>EQUAL 7.0%<8.0%: CPT | Performed by: INTERNAL MEDICINE

## 2021-08-23 RX ORDER — PROMETHAZINE HYDROCHLORIDE 12.5 MG/1
12.5 TABLET ORAL EVERY 6 HOURS PRN
COMMUNITY
End: 2021-11-11

## 2021-08-23 RX ORDER — LORAZEPAM 0.5 MG/1
TABLET ORAL
COMMUNITY
Start: 2021-08-18 | End: 2021-12-23

## 2021-08-23 RX ORDER — BLOOD SUGAR DIAGNOSTIC
1 STRIP MISCELLANEOUS 3 TIMES DAILY
Qty: 300 EACH | Refills: 3 | Status: SHIPPED | OUTPATIENT
Start: 2021-08-23 | End: 2022-02-21 | Stop reason: SDUPTHER

## 2021-08-23 RX ORDER — GABAPENTIN 600 MG/1
600 TABLET ORAL 3 TIMES DAILY
COMMUNITY
End: 2021-12-23

## 2021-08-23 RX ORDER — EMPAGLIFLOZIN 25 MG/1
25 TABLET, FILM COATED ORAL DAILY
Qty: 90 TABLET | Refills: 1 | Status: SHIPPED | OUTPATIENT
Start: 2021-08-23 | End: 2021-09-27

## 2021-08-23 NOTE — TELEPHONE ENCOUNTER
----- Message from Anupam Jara MD sent at 8/19/2021 11:47 PM EDT -----  Please call Alexa Figures or patient and find out how he is doing now. We admitted to hospital but transferred to [facility] in [facility] due to seizures. Were is he now? If out of facility I would like to see him - no follow up visit made as admitted to hospital and not notified of d/c.

## 2021-08-23 NOTE — PROGRESS NOTES
1970    Chief Complaint   Patient presents with    Follow-Up from Hospital      d/c [facility] 8/18/2021     HPI     Pt is a 46 y.o. male who presents for an follow up after hospitalization - [facility]20-7/27 , then transferred to [place] [address] 7/27-8/5, then sent to [facility] rehab. Left heel pain - it was an abscess -did two I and D procedures and still packing. Dr. Jaylene Wong is working with him on this issue. It is slowly getting better. Ulcers - 2 on the right leg - treated for cellulitis with MRSA on cultures - scabs on areas but much better. With the above infections - he was in hospital for 30 days and seizures worsened. Then transferred to [place] [address] for concern for status epilepticus (UC was full - his seizure neurologist is at [facility] - Dr. Jessie Acosta) - very confused, repeating. He had cont EEG for 4 days - no actual seizure but abnormal EEG - likely post-ictal.  At first they increased Depakote but then ammonia level increased and thought was due to Depakote. So went back to 500 mg BID. On lactulose and ammonia level improved - then spiked in a couple days. They decreased Depakote to 250 mg in am and 500 mg at night and increased lactulose. Then went to rehab at [facility]. They checked ammonia level 45 ->70's after a bad day - increased lactulose - on 45 ml TID. They did consult GI - CT liver reportedly ok - need to get from [facility]. They still think it is the Depakote but unable to get response from  Dr. Jessie Acosta office to get further direction on what to do with Depakote dosing. Next appt 1/2022. Out of [facility] rehab Wednesday - need to get all records. Head numbness every day. Got Ativan #6 from Dr. Jessie Acosta. Notices tremoring of right arm worse with elevated ammonia levels. Better mental function but not at baseline. Word finding issues. He has home health nurse and to start OT, PT, and speech therapy.     Get [facility] record - GI work up, CT - thoughts - if think Depakote - need to call Dr. Jessie Acosta and come up with a plan. He missed his outpatient EGD, and cystoscopy while in hospital - need to reschedule. Diabetes mellitus - 124 evening, 159 9 pm (currently taking 50 Units BID long acting insulin, no short acting insulin as not eating much), then ate, and 286 this am.    He was using 10 TID plus SSI but no SSI sent home. Using 2/4/6/8 SSI. Basaglar 50 Units BID. No lows since home. A couple lows in rehab as not eating well. Will start a group 2 scale. No diarrhea on lactulose. One loose stool yesterday - one BM a day at most.  He usually has IBS with loose stools. He is to see Nahomy Rodas at [place] [id number], 9/22/21. Discussed at last visit:    Abdominal pain - at last visit patient was having nausea, diarrhea and was advised to use probiotic for the diarrhea, bland diet/Phenergan/PPI. He has an appointment for upper EGD with Dr. Marlene Kee. AMS/Seizure disorder - patient is following with Dr. Nitin Castrejon. He went at [facility] 4/25 for seizure classification. Restarted his medications. Decreased the dose of Vimpat. He did not follow up with physical therapy. Reports that he feels back to normal He was noticed to be very unstable on his feet. Last seizure was a week ago. Mixed hyperlipidemia - Not on any statin therapy, off of Lipitor and fenofibrate since 1/2021. Last LDL 70 4/2021.        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 Nikolay Lopez (due to enlarged spleen)    Anesthesia     seizures with brain surgery due to blood sugar got really high    Bipolar disorder ([address]     Blood clot in vein 04/07/2016    Merna Garcia     Cancer [facility]) 04/2019    thyroid    Chest pain     previously seeing [facility] cardiologist, now seeing Dr. Antoine Burger (LAD bridging on cath 4/2016)    Chronic back pain     Chronic bronchitis ([facility]     Dr. Jo Odonnell Colon polyp 08/30/2016    Depression     seeing Dav Argueta DVT (deep venous thrombosis) ([address] [id number]    not on Coumadin due to unable to regulate - Dr. Yazmin Shine - no etiology found per patient    Esophageal abnormality     nodule     Fatty liver disease, nonalcoholic     U/S 26/1339 [facility]    Furuncle     legs    History of Doppler ultrasound 05/29/2011    No hemodynamically significant carotid stenosis is identified. A thyroid nodule on each side. Dedicated ultrasound of thyroid gland is suggested to further evaluate if clinically indicated.  History of pulmonary embolism 2007    s/p GFF, related to knee surgery    Hx of blood clots     leg and lung    Hyperlipidemia     severely elevated triglycerides    Hypertension     diastolic    Intracranial arachnoid cyst 11/2012    Dr. Clau Herman drained, complicated with seizures, DKA    Irritable bowel syndrome     Liver disease     Lakeisha Ramirez - elevated LFT - positive smooth muscle antibody, steatosis per liver bx 3/2014 with Dr. Julito Britton (multiple drug resistant organisms) resistance 2012    MRSA (methicillin resistant staph aureus) culture positive     h/o in foot and before brain surgery    Nephrolithiasis     noted on CT abdomen 9/2016, 6/2019    Neuropathy     Pancreatic insufficiency     Positive SANDY (antinuclear antibody)     Dr. Ian Saleh - first visit in 6/2013    Prolonged emergence from general anesthesia     Restless legs syndrome     Seizures ([address]     started with brain surgery    Sinus tachycardia     Holter 3/2013 - seeing Dr. Cecilia Gracia fracture [facility])     clips     Sleep apnea     positive sleep apnea per study 10/2020.     Systolic dysfunction     \"systolic bridge\"  EF 60% ECHO 9/2019    Type II or unspecified type diabetes mellitus without mention of complication, not stated as uncontrolled 2007       Past Surgical History:   Procedure Laterality Date    CARDIAC CATHETERIZATION      2011,5-6 years ago   [address]  3-2012    by mouth 3 times daily.  LORazepam (ATIVAN) 0.5 MG tablet Lorazepam Active 0.5 MG PO Twice Daily as needed 0 August 18th, 2021 10:32am      blood glucose test strips (ONETOUCH ULTRA) strip 1 each by In Vitro route 3 times daily DX: E11.65 Dispense Onetouch Ultra 2 test strips 300 each 3    levETIRAcetam (KEPPRA) 1000 MG tablet Take 2 tablets by mouth 2 times daily 60 tablet 3    zonisamide (ZONEGRAN) 100 MG capsule Take 5 capsules by mouth nightly (Patient taking differently: Take 600 mg by mouth nightly ) 30 capsule 3    lactulose (CHRONULAC) 10 GM/15ML solution Take 15 mLs by mouth 3 times daily (Patient taking differently: Take 45 mLs by mouth 3 times daily ) 2 Bottle 1    insulin lispro (HUMALOG) 100 UNIT/ML injection vial Takes 10 units with meals plus group 2 sliding scale      pantoprazole (PROTONIX) 40 MG tablet Take 1 tablet by mouth 2 times daily 180 tablet 1    vitamin D (ERGOCALCIFEROL) 1.25 MG (47965 UT) CAPS capsule Take 1 capsule by mouth once a week (Patient taking differently: Take 50,000 Units by mouth once a week Monday) 12 capsule 1    rOPINIRole (REQUIP) 1 MG tablet TAKE 1 TABLET BY MOUTH THREE TIMES A  tablet 1    lacosamide (VIMPAT) 100 MG TABS tablet Take 200 mg by mouth 2 times daily.        sildenafil (VIAGRA) 100 MG tablet Take 1 tablet by mouth as needed for Erectile Dysfunction 45 tablet 3    vitamin B-12 (CYANOCOBALAMIN) 1000 MCG tablet Take 1,000 mcg by mouth 2 times daily       promethazine (PHENERGAN) 12.5 MG tablet Take 1-2 tablets by mouth every 8 hours as needed for Nausea 60 tablet 1    divalproex (DEPAKOTE) 250 MG DR tablet Take 250 mg by mouth 2 times daily       insulin glargine (BASAGLAR KWIKPEN) 100 UNIT/ML injection pen Inject 50 Units into the skin 2 times daily      DULoxetine (CYMBALTA) 60 MG extended release capsule Take 120 mg by mouth daily      metoprolol tartrate (LOPRESSOR) 25 MG tablet Take 1 tablet by mouth 2 times daily 60 tablet 5    empagliflozin (JARDIANCE) 25 MG tablet Take 25 mg by mouth daily 56 tablet 0     No current facility-administered medications for this visit. Allergies   Allergen Reactions    Ciprofloxacin-Ciproflox Hcl Er Other (See Comments)     Elevated creatinine    Heparin      Monitor for thrombocytopenia    Metformin Nausea Only     Abdominal pain, diarrhea    Niacin And Related Other (See Comments)     Burning sensation, severe flushing    Tramadol Hcl      Contraindication to seizure medications    Ultram [Tramadol]      Contraindication to seizure medications    Warfarin      Very difficult to regulate in past       Review of Systems - General ROS: negative for - chills or fever  Psychological ROS: negative for - anxiety and depression - stable per patient but wife thinks he is anxious  Hematological and Lymphatic ROS: No history of blood clots or bleeding disorder. Respiratory ROS: no cough, shortness of breath, or wheezing  Cardiovascular ROS: no chest pain or dyspnea on exertion, tolerates a flight of stairs, vacuuming  Gastrointestinal ROS: chronic abdominal pain, no black or bloody stools  Genito-Urinary ROS: no dysuria, trouble voiding, or hematuria  Musculoskeletal ROS: negative for - muscle pain or muscular weakness  Neurological ROS: negative for - positive occasional headaches,posiive numbness/tingling, seizures - walker for short distance - toe touch on left foot due to heel wound  Dermatological ROS: negative for - rash or skin lesion changes - except see above    Blood pressure 110/72, pulse 74, temperature 98.4 °F (36.9 °C), height 6' 2.02\" (1.88 m).     Physical Examination: General appearance -alert, well appearing, and in no distress  Head - atraumatic, normocephalic  Eyes - pupils equal and reactive to light, extraocular muscles intact  Ears - external ears are normal, hearing is grossly normal  Mouth - oral pharynx clear, mucous membranes moist  Neck - supple, no significant adenopathy  Chest - clear to auscultation, no wheezes, rales or rhonchi, symmetric air entry  Heart - normal rate, regular rhythm, normal S1, S2, no murmurs, rubs, clicks or gallops  Abdomen -soft, nontender, nondistended  Neurological - alert, speech intact. Extremities - peripheral pulses normal, no pedal edema, no clubbing or cyanosis, right upper anterior shin with healing ulcers. Left heel reported wound with packing - did not remove dressing   Skin - warm and dry    Diagnostic data:   I have reviewed recent diagnostic testing including labs 7-8/2021, X-ray left foot, CT left leg,  MRI left ankle, MRI brain with patient today. Assessment and Plan:     Diagnosis Orders   1. Type 2 diabetes mellitus with hyperglycemia, with long-term current use of insulin ([address]     2. Type 2 diabetes mellitus with diabetic polyneuropathy, with long-term current use of insulin (HCC)  blood glucose test strips (ONETOUCH ULTRA) strip    DISCONTINUED: empagliflozin (JARDIANCE) 25 MG tablet   3. Primary hypertension     4. Sinus tachycardia     5. Nausea     6. Chronic abdominal pain     7. Hyperammonemia (HCC)  Ammonia    Comprehensive Metabolic Panel   8. Seizures (HCC)  Comprehensive Metabolic Panel    Valproic Acid Level, Total   9. Encephalopathy  Comprehensive Metabolic Panel    CBC Auto Differential   10. Atherosclerotic heart disease of native coronary artery with other forms of angina pectoris ([address]     11. End of life care  [address]     DM - will continue 50 Units BID and add SSI level 2. Once he starts eating better then can use base of 10 but wait till eating better. They are to call in numbers if >200 or <80. Neurontin was decreased to 400 mg QID per neurology. Will have Doni Farias contact him on how to get assistance with Jardiance.     Hypertension/Tachycardia - metoprolol changed from 50 mg BID to 25 mg BID during recent hospitalization - BP and HR controlled today on lower dose.    Nausea/abdominal pain/hyperammonemia - reminded him to reschedule EGD that was missed while he was in hospital.  Need to monitor nutrition. Discussed DM supplements to give protein and limit carbs but he is not interested. Continue lactulose - no diarrhea - able to tolerate. Seizure disorder/Encephalopathy - mentation improving - advised to consider calling Dr. Allison Reed again or send DeLille Cellarst message if possible to discuss if need to decrease Depakote further or add any additional medication. Wife states she has called. Will see GI and discuss Depakote and ammonia levels. They also have psychiatrist and need to discuss Depakote with them. Need to get additional notes from hospitalizations. Atherosclerotic heart disease - not significant as documented on cardiac cath 4/27/16 - very minimal bridging of LAD, does not seem to be severe enough to cause major symptoms  with nonobstructive mild lumen irregularity. He and \"wife\" are  but living together - recommended he set up [facility] as he wants her to make medical decisions. They have 3 daughters together but his ex-wife is a nurse and helps direct his care when he has been confused at recent hospitalizations. As issues with mentation have been intermittent suggested he get this done now that he is back to more baseline and competent to do this. Urology - he missed cystoscopy - reminded him to reschedule when stable. Heel ulcer - defer to Dr. Brownlee Fearing. Healing wounds on shin - continue to monitor but improving. Keep previously scheduled follow up appointment. Park Conde MD     . Misha Teague  INTERNAL MEDICINE  [address]  Dept: [phone]  Dept Fax: [phone] : [phone]

## 2021-08-26 ENCOUNTER — CLINICAL DOCUMENTATION (OUTPATIENT)
Dept: SPIRITUAL SERVICES | Facility: CLINIC | Age: 51
End: 2021-08-26

## 2021-08-26 NOTE — ACP (ADVANCE CARE PLANNING)
Advance Care Planning   Ambulatory ACP Specialist Patient Outreach    Date:  8/26/2021  ACP Specialist:  Misael Dominguez    Outreach call to patient in follow-up to ACP Specialist referral from: Kell Baird MD    [x] PCP  [] Provider   [] Ambulatory Care Management [] Other for Reason:    [x] Advance Directive Assistance  [] Code Status Discussion  [] Complete Portable DNR Order  [x] Discuss Goals of Care  [] Complete POST/MOST  [] Early ACP Decision-Making  [] Other    Date Referral Received: 8/23/2021    Today's Outreach:  [x] First   [] Second  [] Third                               Third outreach made by []  phone  [] email []   Refined Labst     Intervention:  [] Spoke with Patient  [x] Left VM requesting return call      Outcome:    made an initial attempt to contact Niru Hilliard via telephone call to offer support, if desired, in the completion of Advance Directives documents as part of an [address] conversation. Per referral, \"end of life care. \"   * No answer. Left message. *  will follow-up. Next Step:   [] ACP scheduled conversation  [x] Outreach again in one week               [] Email / Mail ACP Info Sheets  [] Email / Mail Advance Directive            [] Close Referral. Routing closure to referring provider/staff and to ACP Specialist .      Thank you for this referral.

## 2021-08-30 ENCOUNTER — TELEPHONE (OUTPATIENT)
Dept: INTERNAL MEDICINE CLINIC | Age: 51
End: 2021-08-30

## 2021-08-30 NOTE — TELEPHONE ENCOUNTER
Home health nurse called stating Mendoza Heredia was receiving speech therapy as an inpatient and he is asking to continue with it through home health . Okay per verbal order Dr. Mirella Ramírez to have speech therapist through home health . Notified Fran Tomlinson that Dr. Mirella Ramírez is okay to continue the speech therapy at home .  Nurse states that she will fax over the order for Dr. Mirella Ramírez to sign ,

## 2021-09-01 ENCOUNTER — TELEPHONE (OUTPATIENT)
Dept: INTERNAL MEDICINE CLINIC | Age: 51
End: 2021-09-01

## 2021-09-01 NOTE — TELEPHONE ENCOUNTER
----- Message from Lennie Ayoub MD sent at 8/29/2021  5:35 PM EDT -----  Let patient/wife know that his ammonia level was normal, Depakote level is subtherapeutic. Leave Depakote dose where it is unless has more seizures. Please see my last note. No OCs.

## 2021-09-02 ENCOUNTER — CARE COORDINATION (OUTPATIENT)
Dept: CARE COORDINATION | Age: 51
End: 2021-09-02

## 2021-09-02 RX ORDER — EMPAGLIFLOZIN 25 MG/1
25 TABLET, FILM COATED ORAL DAILY
Qty: 56 TABLET | Refills: 0 | COMMUNITY
Start: 2021-09-02 | End: 2022-01-13

## 2021-09-02 NOTE — CARE COORDINATION
Resending the patient his portion of the applications for assistance on his high cost medications.       Ai Hicks [facility]  [address] [facility] [address]   Medication Assistance  [address] [facility]    [phone]

## 2021-09-07 ENCOUNTER — CLINICAL DOCUMENTATION (OUTPATIENT)
Dept: SPIRITUAL SERVICES | Facility: CLINIC | Age: 51
End: 2021-09-07

## 2021-09-07 NOTE — ACP (ADVANCE CARE PLANNING)
Advance Care Planning   Ambulatory ACP Specialist Patient Outreach    Date:  9/7/2021  ACP Specialist:  Nancy Christy    Outreach call to patient in follow-up to ACP Specialist referral from: Jarek Puentes MD    [x] PCP  [] Provider   [] Ambulatory Care Management [] Other for Reason:    [x] Advance Directive Assistance  [] Code Status Discussion  [] Complete Portable DNR Order  [x] Discuss Goals of Care  [] Complete POST/MOST  [] Early ACP Decision-Making  [] Other    Date Referral Received: 8/23/2021    Today's Outreach:  [] First   [x] Second  [] Third                               Third outreach made by []  phone  [] email []   MyChart     Intervention:  [x] Spoke with Patient  [] Left VM requesting return call      Outcome:   Erick Godinez made an initial attempt to contact Miguelina Roy via telephone call to offer support, if desired, in the completion of Advance Directives documents as part of an [address] conversation. Per referral, \"end of life care. \"   *  spoke with both Miguelina Roy, as well as his Significant Other Susana Cason. * He expressed interest in the completion of documents. *  briefly explained documents to both for clarity and greater understanding, as well as information needed for completion. * They requested a copy of documents be mailed to them for review prior to completion. *  remains available to assist with completion as needed.  will follow-up. Next Step:   [] ACP scheduled conversation  [] Outreach again in one week               [] Email / Mail ACP Info Sheets  [x] Email / Mail Advance Directive            [] Close Referral. Routing closure to referring provider/staff and to ACP Specialist .      Thank you for this referral.

## 2021-09-14 ENCOUNTER — TELEPHONE (OUTPATIENT)
Dept: INTERNAL MEDICINE CLINIC | Age: 51
End: 2021-09-14

## 2021-09-14 DIAGNOSIS — E72.20 HYPERAMMONEMIA (HCC): Primary | ICD-10-CM

## 2021-09-14 NOTE — TELEPHONE ENCOUNTER
Spoke with [facility] and he is asking if you would order a repeat ammonia level so it is closer to his GI appointment  . Patient is seeing GI on 9/22/21. Okay to order ?

## 2021-09-14 NOTE — TELEPHONE ENCOUNTER
----- Message from [employer] sent at 9/14/2021 11:44 AM EDT -----  Subject: Referral Request    QUESTIONS   Reason for referral request? blood work / pneumonia level   Has the physician seen you for this condition before? Yes  Select a date? 2021-08-23  Select the Provider the patient wants to be referred to, if known (PCP or   Specialist)? Rudolph Torres   Preferred Specialist (if applicable)? Do you already have an appointment scheduled? Yes  Select Scheduled Date? 2021-09-22  Select Scheduled Physician? Outside Physician - GI doctor   Additional Information for Provider?   ---------------------------------------------------------------------------  --------------  [address]  What is the best way for the office to contact you?  OK to leave message on   voicemail  Preferred Call Back Phone Number? [phone]

## 2021-09-16 ENCOUNTER — NURSE ONLY (OUTPATIENT)
Dept: LAB | Age: 51
End: 2021-09-16

## 2021-09-16 DIAGNOSIS — E72.20 HYPERAMMONEMIA (HCC): ICD-10-CM

## 2021-09-16 LAB — AMMONIA: 62 UMOL/L (ref 11–60)

## 2021-09-16 NOTE — TELEPHONE ENCOUNTER
Okay per Dr. Mara Cheek . Ammonia level ordered . Patient notified and is going to [address] to have done .

## 2021-09-27 ENCOUNTER — OFFICE VISIT (OUTPATIENT)
Dept: INTERNAL MEDICINE CLINIC | Age: 51
End: 2021-09-27
Payer: MEDICARE

## 2021-09-27 VITALS
HEART RATE: 70 BPM | WEIGHT: 257.2 LBS | DIASTOLIC BLOOD PRESSURE: 68 MMHG | SYSTOLIC BLOOD PRESSURE: 104 MMHG | TEMPERATURE: 97.2 F | BODY MASS INDEX: 33.01 KG/M2 | HEIGHT: 74 IN

## 2021-09-27 DIAGNOSIS — R25.1 TREMOR OF UNKNOWN ORIGIN: ICD-10-CM

## 2021-09-27 DIAGNOSIS — L03.115 CELLULITIS OF RIGHT LOWER EXTREMITY: Primary | ICD-10-CM

## 2021-09-27 PROCEDURE — G8417 CALC BMI ABV UP PARAM F/U: HCPCS | Performed by: INTERNAL MEDICINE

## 2021-09-27 PROCEDURE — 1036F TOBACCO NON-USER: CPT | Performed by: INTERNAL MEDICINE

## 2021-09-27 PROCEDURE — 3017F COLORECTAL CA SCREEN DOC REV: CPT | Performed by: INTERNAL MEDICINE

## 2021-09-27 PROCEDURE — G8427 DOCREV CUR MEDS BY ELIG CLIN: HCPCS | Performed by: INTERNAL MEDICINE

## 2021-09-27 PROCEDURE — 99214 OFFICE O/P EST MOD 30 MIN: CPT | Performed by: INTERNAL MEDICINE

## 2021-09-27 RX ORDER — CEPHALEXIN 500 MG/1
500 CAPSULE ORAL 4 TIMES DAILY
Qty: 40 CAPSULE | Refills: 0 | Status: SHIPPED | OUTPATIENT
Start: 2021-09-27 | End: 2021-10-27 | Stop reason: ALTCHOICE

## 2021-09-27 RX ORDER — SULFAMETHOXAZOLE AND TRIMETHOPRIM 800; 160 MG/1; MG/1
1 TABLET ORAL 2 TIMES DAILY
Qty: 20 TABLET | Refills: 0 | Status: SHIPPED | OUTPATIENT
Start: 2021-09-27 | End: 2021-10-07

## 2021-09-27 NOTE — PROGRESS NOTES
1970    Chief Complaint   Patient presents with    Tremors    Other     new sore on right leg     HPI     Pt is a 46 y.o. male who presents for an urgent visit. Lesions on right leg started Saturday. No fever and FSBS controlled. Right mid calf posterior scab with surrounding erythema, no drainage. Right groin with open lesion <0.5 cm with surrounding erythema, positive drainage. I can't express anything today to culture. Start antibiotics and monitor - go to ED if worsens. MRSA in previous lesions so will do Keflex and Bactrim. Patient decided to decrease Depakote 250 and 500 mg- now on 250 mg BID - as GI felt this was causing elevated ammonia level. He is on this for mood and difficult to get psych to respond - they called 2x and no response - appt Monday to discuss with psych. They think he might have had a seizure yesterday. He wasn't feeling good all day and took Ativan in am.   Found on floor next to bed right before supper. He was out of it but he was able to respond when shook him. He was able to get back in bed. FSBS 120 at the time. He doesn't remember how got on floor. No incontinence with episode. No injury if on floor due to fall. Rescheduled EGD in 11/2021. Fibroscan in office to be done in GI office. GI instructed him to adjust lactulose based on 3 BM a day. DM - up and down - 3 lows, some highs. Over all not bad. Need to limit pop. Some days only eating supper. Encouraged [employer]. Tremors/Seizures - want to discus. EEG 4/2021 right arm tremor mostly and occas right leg -  not seizure related but getting worse. Is it psychsomatic? But want to know if can do anything else to help. Tremor happens in his sleep. Also notice stuttering occasionally with tremor. He is also having thought process issues - worked with home health speech and has exercises (now done with home health).   He had Dr. Liu Shows for seizures in [place], but they don't think this is seizures. Will set up with neurology that was going to follow up with him after being seen in [place], to have a general neurologist to evaluate him as a whole. Recently has Left heel abscess -did 2 I and D procedures. Dr. Mae Blackmon if working with him on this issues. It is slowly getting better. Past Medical History:   Diagnosis Date    Abnormal thyroid biopsy     s/p left hemithyroidectomy 7/6331 - follicular adenoma    Acid reflux     Anemia     resolved - previously seen by Dr. Yissel Sarah (due to enlarged spleen)    Anesthesia     seizures with brain surgery due to blood sugar got really high    [facility]     Blood clot in vein 04/07/2016    [facility]     Cancer [facility]) 04/2019    thyroid    Chest pain     previously seeing [facility] cardiologist, now seeing Dr. Narciso Reyes (LAD bridging on cath 4/2016)    Chronic back pain     Chronic bronchitis ([facility]     Dr. Corie Dillard Colon polyp 08/30/2016    Depression     seeing Anna House DVT (deep venous thrombosis) ([facility] [id number]    not on Coumadin due to unable to regulate - Dr. Renato Weems - no etiology found per patient    Esophageal abnormality     nodule     Fatty liver disease, nonalcoholic     U/S 55/[facility]    Furuncle     legs    History of Doppler ultrasound 05/29/2011    No hemodynamically significant carotid stenosis is identified. A thyroid nodule on each side. Dedicated ultrasound of thyroid gland is suggested to further evaluate if clinically indicated.      History of pulmonary embolism 2007    s/p GFF, related to knee surgery    Hx of blood clots     leg and lung    Hyperlipidemia     severely elevated triglycerides    Hypertension     diastolic    Intracranial arachnoid cyst 11/2012    Dr. Kelly Mendiola drained, complicated with seizures, DKA    Irritable bowel syndrome     Liver disease     Jennifer Mc - elevated LFT - positive smooth muscle antibody, steatosis per liver bx 3/2014 with Dr. Darshana Krueger (multiple drug resistant organisms) resistance 2012    MRSA (methicillin resistant staph aureus) culture positive     h/o in foot and before brain surgery    Nephrolithiasis     noted on CT abdomen 9/2016, 6/2019    Neuropathy     Pancreatic insufficiency     Positive SANDY (antinuclear antibody)     Dr. Apolonia Lees - first visit in 6/2013    Prolonged emergence from general anesthesia     Restless legs syndrome     Seizures ([address]     started with brain surgery    Sinus tachycardia     Holter 3/2013 - seeing Dr. Kaushik Marinelli Skull fracture [facility])     clips     Sleep apnea     positive sleep apnea per study 10/2020.  Systolic dysfunction     \"systolic bridge\"  EF 74% ECHO 9/2019    Type II or unspecified type diabetes mellitus without mention of complication, not stated as uncontrolled 2007       Past Surgical History:   Procedure Laterality Date    CARDIAC CATHETERIZATION      2011,5-6 years ago   [address]  3-2012   [address]  04/28/2016    [facility]     CARPAL TUNNEL RELEASE  01/2017    CHOLECYSTECTOMY  2004    COLONOSCOPY  2012    COLONOSCOPY  08/2016    2 tubular adenomas - reportedly needs repeat scope in 6 months    CRANIOTOMY  11/2012    arachnoid cyst drainage    EKG 12-LEAD  10/18/2015         ENDOSCOPY, COLON, DIAGNOSTIC      HERNIA REPAIR Right 1996    [facility]--Dr. Jossie Su INGUINAL HERNIA REPAIR Right 09/15/2016    Robotic assisted    KNEE ARTHROSCOPY Right 2016    KNEE SURGERY Left 2007    acl and debrided twice    OTHER SURGICAL HISTORY  5-31-11    Tilt table was associated w/ nonspecific symptoms or nausea, otherwise no significant dizziness or syncope. No significant hemodynamic changes. Otherwise, unremarkable tilt table test after 30 minutes of tilting.      OTHER SURGICAL HISTORY  01/20/2017    RIGHT SHOULDER ARTHROSCOPY, OPEN STAN, OPEN ACROMIOPLASTY, BICEP TENDESIS, RIGHT CARPAL TUNNEL RELEASE    SHOULDER SURGERY Right 01/2007    THYROID LOBECTOMY N/A 1/15/2021    THYROID LOBECTOMY, UVULOPALATOPHARYNGOPLAST LAOP performed by Lucrecia Libman, MD at [facility] ECHOCARDIOGRAM  3-05-11    LV size and systolic function normal. EF 55-65%.  UPPER GASTROINTESTINAL ENDOSCOPY  2012    UPPER GASTROINTESTINAL ENDOSCOPY  2016    Dr. Tico Hunt Left 10/22/2019    EGD DILATION SAVORY performed by Pavel Rodriguez MD at [facility] ENDOSCOPY Left 10/22/2019    EGD BIOPSY performed by Pavel Rodriguez MD at [address]  2007       Current Outpatient Medications   Medication Sig Dispense Refill    cephALEXin (KEFLEX) 500 MG capsule Take 1 capsule by mouth 4 times daily 40 capsule 0    sulfamethoxazole-trimethoprim (BACTRIM DS;SEPTRA DS) 800-160 MG per tablet Take 1 tablet by mouth 2 times daily for 10 days 20 tablet 0    metoprolol tartrate (LOPRESSOR) 25 MG tablet Take 1 tablet by mouth 2 times daily 60 tablet 5    empagliflozin (JARDIANCE) 25 MG tablet Take 25 mg by mouth daily 56 tablet 0    gabapentin (NEURONTIN) 600 MG tablet Take 600 mg by mouth 3 times daily.       LORazepam (ATIVAN) 0.5 MG tablet Lorazepam Active 0.5 MG PO Twice Daily as needed 0 August 18th, 2021 10:32am      promethazine (PHENERGAN) 12.5 MG tablet Take 12.5 mg by mouth every 6 hours as needed for Nausea      blood glucose test strips (ONETOUCH ULTRA) strip 1 each by In Vitro route 3 times daily DX: E11.65 Dispense Onetouch Ultra 2 test strips 300 each 3    insulin glargine (LANTUS) 100 UNIT/ML injection vial Inject 60 Units into the skin 2 times daily (Patient taking differently: Inject 50 Units into the skin 2 times daily ) 1 vial 3    divalproex (DEPAKOTE) 500 MG DR tablet Take 1 tablet by mouth nightly (Patient taking differently: Take 250 mg by mouth nightly ) 90 tablet 3    divalproex (DEPAKOTE) 250 MG DR tablet Take 1 tablet by mouth daily 90 tablet 3    levETIRAcetam (KEPPRA) 1000 MG tablet Take 2 tablets by mouth 2 times daily 60 tablet 3    zonisamide (ZONEGRAN) 100 MG capsule Take 5 capsules by mouth nightly 30 capsule 3    DULoxetine (CYMBALTA) 60 MG extended release capsule Take 1 capsule by mouth daily (Patient taking differently: Take 120 mg by mouth daily ) 30 capsule 3    lactulose (CHRONULAC) 10 GM/15ML solution Take 15 mLs by mouth 3 times daily (Patient taking differently: Take 45 mLs by mouth 3 times daily ) 2 Bottle 1    insulin lispro (HUMALOG) 100 UNIT/ML injection vial Takes 10 units with meals plus group 2 sliding scale      pantoprazole (PROTONIX) 40 MG tablet Take 1 tablet by mouth 2 times daily 180 tablet 1    vitamin D (ERGOCALCIFEROL) 1.25 MG (49218 UT) CAPS capsule Take 1 capsule by mouth once a week 12 capsule 1    rOPINIRole (REQUIP) 1 MG tablet TAKE 1 TABLET BY MOUTH THREE TIMES A  tablet 1    lacosamide (VIMPAT) 100 MG TABS tablet Take 200 mg by mouth 2 times daily.  sildenafil (VIAGRA) 100 MG tablet Take 1 tablet by mouth as needed for Erectile Dysfunction 45 tablet 3     No current facility-administered medications for this visit. Allergies   Allergen Reactions    Ciprofloxacin-Ciproflox Hcl Er Other (See Comments)     Elevated creatinine    Heparin      Monitor for thrombocytopenia    Metformin Nausea Only     Abdominal pain, diarrhea    Niacin And Related Other (See Comments)     Burning sensation, severe flushing    Tramadol Hcl      Contraindication to seizure medications    Ultram [Tramadol]      Contraindication to seizure medications    Warfarin      Very difficult to regulate in past       Review of Systems - General ROS: negative for - chills or fever  Psychological ROS: negative for - anxiety and depression - stable per patient but wife thinks he is anxious  Hematological and Lymphatic ROS: No history of blood clots or bleeding disorder.    Respiratory ROS: no cough, shortness of breath, or wheezing  Cardiovascular ROS: no chest pain or dyspnea on exertion, tolerates a flight of stairs, vacuuming  Gastrointestinal ROS: chronic abdominal pain, no black or bloody stools  Genito-Urinary ROS: no dysuria, trouble voiding, or hematuria  Musculoskeletal ROS: negative for - muscle pain or muscular weakness  Neurological ROS: negative for - positive occasional headaches,positive numbness/tingling, seizures  Dermatological ROS: negative for - rash or skin lesion changes - except see above    Blood pressure 104/68, pulse 70, temperature 97.2 °F (36.2 °C), height 6' 2.02\" (1.88 m), weight 257 lb 3.2 oz (116.7 kg). Physical Examination: General appearance -alert, well appearing, and in no distress  Neck - supple, no significant adenopathy  Chest - clear to auscultation, no wheezes, rales or rhonchi, symmetric air entry  Heart - normal rate, regular rhythm, normal S1, S2, no murmurs, rubs, clicks or gallops  Abdomen -soft, nontender, nondistended  Extremities - peripheral pulses normal, no pedal edema, no clubbing or cyanosis  Skin - warm and dry, right mid calf posterior scab with surrounding erythema, no drainage, no fluctuance, very tender. Right groin with open lesion <0.5 cm with surrounding erythema, positive drainage. Diagnostic data: Ammonia level 9/16/21. Assessment and Plan:     Diagnosis Orders   1. Cellulitis of right lower extremity  cephALEXin (KEFLEX) 500 MG capsule    sulfamethoxazole-trimethoprim (BACTRIM DS;SEPTRA DS) 800-160 MG per tablet   2. Tremor of unknown origin  External Referral To Neurology     Cellulitis with 2 ulcers on right leg - treat with Keflex and Bactrim. No drainage to culture now. Very close monitoring. May need IV antibiotics if worsens. Tremor - will refer to general neurology in [facility] - Dr. Magdiel Raymond just focuses on seizures. Follow up in 2 weeks.     Anais Meraz MD    . Misha Teague  INTERNAL MEDICINE  [address], [address]  Dept: [phone]  Dept Fax: [phone] : [phone]

## 2021-09-29 ENCOUNTER — HOSPITAL ENCOUNTER (EMERGENCY)
Age: 51
Discharge: HOME | End: 2021-09-29
Attending: EMERGENCY MEDICINE
Payer: MEDICARE

## 2021-09-29 ENCOUNTER — APPOINTMENT (OUTPATIENT)
Dept: GENERAL RADIOLOGY | Age: 51
End: 2021-09-29
Payer: MEDICARE

## 2021-09-29 ENCOUNTER — OFFICE VISIT (OUTPATIENT)
Dept: INTERNAL MEDICINE CLINIC | Age: 51
End: 2021-09-29
Payer: MEDICARE

## 2021-09-29 VITALS
HEIGHT: 74 IN | TEMPERATURE: 98.1 F | HEART RATE: 80 BPM | SYSTOLIC BLOOD PRESSURE: 118 MMHG | BODY MASS INDEX: 33.24 KG/M2 | WEIGHT: 259 LBS | DIASTOLIC BLOOD PRESSURE: 70 MMHG

## 2021-09-29 VITALS
OXYGEN SATURATION: 95 % | HEART RATE: 80 BPM | TEMPERATURE: 97.9 F | RESPIRATION RATE: 16 BRPM | SYSTOLIC BLOOD PRESSURE: 120 MMHG | DIASTOLIC BLOOD PRESSURE: 88 MMHG

## 2021-09-29 DIAGNOSIS — L03.119 CELLULITIS AND ABSCESS OF LEG: Primary | ICD-10-CM

## 2021-09-29 DIAGNOSIS — L02.419 CELLULITIS AND ABSCESS OF LEG: Primary | ICD-10-CM

## 2021-09-29 DIAGNOSIS — L03.115 CELLULITIS AND ABSCESS OF RIGHT LEG: Primary | ICD-10-CM

## 2021-09-29 DIAGNOSIS — L02.415 CELLULITIS AND ABSCESS OF RIGHT LEG: Primary | ICD-10-CM

## 2021-09-29 LAB
ALBUMIN SERPL-MCNC: 4.4 G/DL (ref 3.5–5.1)
ALP BLD-CCNC: 83 U/L (ref 38–126)
ALT SERPL-CCNC: 17 U/L (ref 11–66)
ANION GAP SERPL CALCULATED.3IONS-SCNC: 12 MEQ/L (ref 8–16)
AST SERPL-CCNC: 15 U/L (ref 5–40)
BASOPHILS # BLD: 0.4 %
BASOPHILS ABSOLUTE: 0 THOU/MM3 (ref 0–0.1)
BILIRUB SERPL-MCNC: 0.5 MG/DL (ref 0.3–1.2)
BUN BLDV-MCNC: 14 MG/DL (ref 7–22)
C-REACTIVE PROTEIN: 0.47 MG/DL (ref 0–1)
CALCIUM SERPL-MCNC: 9 MG/DL (ref 8.5–10.5)
CHLORIDE BLD-SCNC: 104 MEQ/L (ref 98–111)
CO2: 23 MEQ/L (ref 23–33)
CREAT SERPL-MCNC: 1 MG/DL (ref 0.4–1.2)
EOSINOPHIL # BLD: 2.7 %
EOSINOPHILS ABSOLUTE: 0.2 THOU/MM3 (ref 0–0.4)
ERYTHROCYTE [DISTWIDTH] IN BLOOD BY AUTOMATED COUNT: 13.5 % (ref 11.5–14.5)
ERYTHROCYTE [DISTWIDTH] IN BLOOD BY AUTOMATED COUNT: 43.7 FL (ref 35–45)
GFR SERPL CREATININE-BSD FRML MDRD: 79 ML/MIN/1.73M2
GLUCOSE BLD-MCNC: 204 MG/DL (ref 70–108)
HCT VFR BLD CALC: 48.6 % (ref 42–52)
HEMOGLOBIN: 16.7 GM/DL (ref 14–18)
IMMATURE GRANS (ABS): 0.05 THOU/MM3 (ref 0–0.07)
IMMATURE GRANULOCYTES: 0.7 %
LYMPHOCYTES # BLD: 26 %
LYMPHOCYTES ABSOLUTE: 1.9 THOU/MM3 (ref 1–4.8)
MCH RBC QN AUTO: 30.7 PG (ref 26–33)
MCHC RBC AUTO-ENTMCNC: 34.4 GM/DL (ref 32.2–35.5)
MCV RBC AUTO: 89.3 FL (ref 80–94)
MONOCYTES # BLD: 7.1 %
MONOCYTES ABSOLUTE: 0.5 THOU/MM3 (ref 0.4–1.3)
NUCLEATED RED BLOOD CELLS: 0 /100 WBC
PLATELET # BLD: 187 THOU/MM3 (ref 130–400)
PMV BLD AUTO: 10.1 FL (ref 9.4–12.4)
POTASSIUM REFLEX MAGNESIUM: 4.1 MEQ/L (ref 3.5–5.2)
RBC # BLD: 5.44 MILL/MM3 (ref 4.7–6.1)
SEG NEUTROPHILS: 63.1 %
SEGMENTED NEUTROPHILS ABSOLUTE COUNT: 4.5 THOU/MM3 (ref 1.8–7.7)
SODIUM BLD-SCNC: 139 MEQ/L (ref 135–145)
TOTAL PROTEIN: 7.3 G/DL (ref 6.1–8)
WBC # BLD: 7.2 THOU/MM3 (ref 4.8–10.8)

## 2021-09-29 PROCEDURE — 1036F TOBACCO NON-USER: CPT | Performed by: STUDENT IN AN ORGANIZED HEALTH CARE EDUCATION/TRAINING PROGRAM

## 2021-09-29 PROCEDURE — 86140 C-REACTIVE PROTEIN: CPT

## 2021-09-29 PROCEDURE — 73590 X-RAY EXAM OF LOWER LEG: CPT

## 2021-09-29 PROCEDURE — 99282 EMERGENCY DEPT VISIT SF MDM: CPT

## 2021-09-29 PROCEDURE — 3017F COLORECTAL CA SCREEN DOC REV: CPT | Performed by: STUDENT IN AN ORGANIZED HEALTH CARE EDUCATION/TRAINING PROGRAM

## 2021-09-29 PROCEDURE — 87040 BLOOD CULTURE FOR BACTERIA: CPT

## 2021-09-29 PROCEDURE — 36415 COLL VENOUS BLD VENIPUNCTURE: CPT

## 2021-09-29 PROCEDURE — 80053 COMPREHEN METABOLIC PANEL: CPT

## 2021-09-29 PROCEDURE — G8417 CALC BMI ABV UP PARAM F/U: HCPCS | Performed by: STUDENT IN AN ORGANIZED HEALTH CARE EDUCATION/TRAINING PROGRAM

## 2021-09-29 PROCEDURE — 85025 COMPLETE CBC W/AUTO DIFF WBC: CPT

## 2021-09-29 PROCEDURE — 10060 I&D ABSCESS SIMPLE/SINGLE: CPT

## 2021-09-29 PROCEDURE — 99214 OFFICE O/P EST MOD 30 MIN: CPT | Performed by: STUDENT IN AN ORGANIZED HEALTH CARE EDUCATION/TRAINING PROGRAM

## 2021-09-29 PROCEDURE — G8427 DOCREV CUR MEDS BY ELIG CLIN: HCPCS | Performed by: STUDENT IN AN ORGANIZED HEALTH CARE EDUCATION/TRAINING PROGRAM

## 2021-09-29 ASSESSMENT — ENCOUNTER SYMPTOMS
VOMITING: 0
SHORTNESS OF BREATH: 0
COUGH: 0
COUGH: 0
BACK PAIN: 0
SHORTNESS OF BREATH: 0
ABDOMINAL PAIN: 0
NAUSEA: 0
CONSTIPATION: 0
VOMITING: 0
ABDOMINAL PAIN: 0

## 2021-09-29 ASSESSMENT — PAIN DESCRIPTION - PAIN TYPE: TYPE: ACUTE PAIN

## 2021-09-29 ASSESSMENT — PAIN DESCRIPTION - LOCATION: LOCATION: LEG

## 2021-09-29 ASSESSMENT — PAIN DESCRIPTION - DESCRIPTORS: DESCRIPTORS: SHARP

## 2021-09-29 ASSESSMENT — PAIN DESCRIPTION - ORIENTATION: ORIENTATION: RIGHT;LOWER

## 2021-09-29 ASSESSMENT — PAIN SCALES - GENERAL: PAINLEVEL_OUTOF10: 8

## 2021-09-29 NOTE — PROGRESS NOTES
Patient Name: Herbert Woodall  YOB: 1970  MRN: [id number]  Office visit date: 9/29/2021    Chief Complaint: Other (Spot on right leg, constantly hot to the touch. Severe pain and pressure have been present for the past couple of days.)    Assessment/Plan:  1. Cellulitis of right lower leg: With likely underlying abscess. Due to his MRSA lesions on the same leg over a month ago, concern for MRSA again. Even with his severe neuropathy he has exquisite tenderness to palpation around the lesion extending 5 inches out circumferentially. Concern for sepsis and osteomyelitis. - Sent patient to [address] [place] [address]. Report called into ED. - Advised patient to call our office for a follow-up appointment once he gets discharged from ED or hospital if he gets admitted    Return if symptoms worsen or fail to improve. Subjective/Objective:    HPI:     Herbert Woodall is a 46 y.o. male who presents for an established patient visit. He is here for evaluation of Other (Spot on right leg, constantly hot to the touch. Severe pain and pressure have been present for the past couple of days.)    Patient is here for an urgent care visit for a lesion on the back of his right leg that has been there for about 5 days. He saw Dr. Rani Petit on 9/27/21 who prescribed Keflex and Bactrim, but it has been slowly getting worse with pain, body sweats, and subjective fever. He is unsure how he got this lesion as he does not recall any bug bites or trauma. He states he has severe neuropathy up to his knees, so he does not know when he has any injuries to his feet or leg. His daughter also reports finding him passed out on the floor the other day for about 3 minutes. He denies any headaches, lightheadedness, dizziness, chest pain, shortness of breath, palpitations, abdominal pain, nausea, vomiting.     Of note he reports being hospitalized over a month ago for an abscess on his left foot and he also developed two lesions that were positive for MRSA on his right leg by his knee. Past Medical History:   Diagnosis Date    Abnormal thyroid biopsy     s/p left hemithyroidectomy 0/5741 - follicular adenoma    Acid reflux     Anemia     resolved - previously seen by Dr. Griselda Arriaga (due to enlarged spleen)    Anesthesia     seizures with brain surgery due to blood sugar got really high    Bipolar disorder ([facility]     Blood clot in vein 04/07/2016    Isabella Flores     Cancer [facility]) 04/2019    thyroid    Chest pain     previously seeing [facility] cardiologist, now seeing Dr. Eric Echavarria (LAD bridging on cath 4/2016)    Chronic back pain     Chronic bronchitis ([facility]     Dr. Chloé Catherine Colon polyp 08/30/2016    Depression     seeing Florian Gonzalez DVT (deep venous thrombosis) [facility] [phone]    not on Coumadin due to unable to regulate - Dr. Clau Palacios - no etiology found per patient    Esophageal abnormality     nodule     Fatty liver disease, nonalcoholic     U/S 20/7304 [facility]    Furuncle     legs    History of Doppler ultrasound 05/29/2011    No hemodynamically significant carotid stenosis is identified. A thyroid nodule on each side. Dedicated ultrasound of thyroid gland is suggested to further evaluate if clinically indicated.      History of pulmonary embolism 2007    s/p GFF, related to knee surgery    Hx of blood clots     leg and lung    Hyperlipidemia     severely elevated triglycerides    Hypertension     diastolic    Intracranial arachnoid cyst 11/2012    Dr. Claire Delong drained, complicated with seizures, DKA    Irritable bowel syndrome     Liver disease     Dulcy Sera - elevated LFT - positive smooth muscle antibody, steatosis per liver bx 3/2014 with Dr. Cuco Kolb (multiple drug resistant organisms) resistance 2012    MRSA (methicillin resistant staph aureus) culture positive     h/o in foot and before brain surgery    Nephrolithiasis     noted on CT abdomen 9/2016, 6/2019    Neuropathy     Pancreatic insufficiency     Positive SANDY (antinuclear antibody)     Dr. Doretha Adams - first visit in 6/2013    Prolonged emergence from general anesthesia     Restless legs syndrome     Seizures ([address]     started with brain surgery    Sinus tachycardia     Holter 3/2013 - seeing Dr. Singh Self Skull fracture [facility])     clips     Sleep apnea     positive sleep apnea per study 10/2020.  Systolic dysfunction     \"systolic bridge\"  EF 30% ECHO 9/2019    Type II or unspecified type diabetes mellitus without mention of complication, not stated as uncontrolled 2007     Past Surgical History:   Procedure Laterality Date    CARDIAC CATHETERIZATION      2011,5-6 years ago   [address]  3-2012   [address]  04/28/2016    [facility]     CARPAL TUNNEL RELEASE  01/2017    CHOLECYSTECTOMY  2004    COLONOSCOPY  2012    COLONOSCOPY  08/2016    2 tubular adenomas - reportedly needs repeat scope in 6 months    CRANIOTOMY  11/2012    arachnoid cyst drainage    EKG 12-LEAD  10/18/2015         ENDOSCOPY, COLON, DIAGNOSTIC      HERNIA REPAIR Right 1996    [facility]--Dr. Inez Ramirez INGUINAL HERNIA REPAIR Right 09/15/2016    Robotic assisted    KNEE ARTHROSCOPY Right 2016    KNEE SURGERY Left 2007    acl and debrided twice    OTHER SURGICAL HISTORY  5-31-11    Tilt table was associated w/ nonspecific symptoms or nausea, otherwise no significant dizziness or syncope. No significant hemodynamic changes. Otherwise, unremarkable tilt table test after 30 minutes of tilting.  OTHER SURGICAL HISTORY  01/20/2017    RIGHT SHOULDER ARTHROSCOPY, OPEN STAN, OPEN ACROMIOPLASTY, BICEP TENDESIS, RIGHT CARPAL TUNNEL RELEASE    SHOULDER SURGERY Right 01/2007    THYROID LOBECTOMY N/A 1/15/2021    THYROID LOBECTOMY, UVULOPALATOPHARYNGOPLAST LAOP performed by Criss Blanchard MD at [facility] ECHOCARDIOGRAM  3-05-11    LV size and systolic function normal. EF 55-65%.      UPPER GASTROINTESTINAL ENDOSCOPY  2012    UPPER taking differently: Take 120 mg by mouth daily ) 30 capsule 3    lactulose (CHRONULAC) 10 GM/15ML solution Take 15 mLs by mouth 3 times daily (Patient taking differently: Take 45 mLs by mouth 3 times daily ) 2 Bottle 1    insulin lispro (HUMALOG) 100 UNIT/ML injection vial Takes 10 units with meals plus group 2 sliding scale      pantoprazole (PROTONIX) 40 MG tablet Take 1 tablet by mouth 2 times daily 180 tablet 1    vitamin D (ERGOCALCIFEROL) 1.25 MG (95753 UT) CAPS capsule Take 1 capsule by mouth once a week 12 capsule 1    rOPINIRole (REQUIP) 1 MG tablet TAKE 1 TABLET BY MOUTH THREE TIMES A  tablet 1    lacosamide (VIMPAT) 100 MG TABS tablet Take 200 mg by mouth 2 times daily.  sildenafil (VIAGRA) 100 MG tablet Take 1 tablet by mouth as needed for Erectile Dysfunction 45 tablet 3     No current facility-administered medications for this visit.      Allergies   Allergen Reactions    Ciprofloxacin-Ciproflox Hcl Er Other (See Comments)     Elevated creatinine    Heparin      Monitor for thrombocytopenia    Metformin Nausea Only     Abdominal pain, diarrhea    Niacin And Related Other (See Comments)     Burning sensation, severe flushing    Tramadol Hcl      Contraindication to seizure medications    Ultram [Tramadol]      Contraindication to seizure medications    Warfarin      Very difficult to regulate in past     Social History     Socioeconomic History    Marital status:      Spouse name: Not on file    Number of children: 3    Years of education: Not on file    Highest education level: Not on file   Occupational History    Occupation: Disability since 2011   Tobacco Use    Smoking status: Never Smoker    Smokeless tobacco: Never Used   Vaping Use    Vaping Use: Never used   Substance and Sexual Activity    Alcohol use: No     Alcohol/week: 0.0 standard drinks    Drug use: No    Sexual activity: Not on file   Other Topics Concern    Not on file   Social History Narrative    Not on file     Social Determinants of Health     Financial Resource Strain: Medium Risk    Difficulty of Paying Living Expenses: Somewhat hard   Food Insecurity: No Food Insecurity    Worried About Running Out of Food in the Last Year: Never true    Jeffry of Food in the Last Year: Never true   Transportation Needs:     Lack of Transportation (Medical):  Lack of Transportation (Non-Medical):    Physical Activity:     Days of Exercise per Week:     Minutes of Exercise per Session:    Stress:     Feeling of Stress :    Social Connections:     Frequency of Communication with Friends and Family:     Frequency of Social Gatherings with Friends and Family:     Attends Christian Services:     Active Member of Clubs or Organizations:     Attends Club or Organization Meetings:     Marital Status:    Intimate Partner Violence:     Fear of Current or Ex-Partner:     Emotionally Abused:     Physically Abused:     Sexually Abused:      Family History   Problem Relation Age of Onset    Heart Disease Father 61        MI    Stroke Brother     Obesity Brother     Arthritis Mother     Seizures Mother     Obesity Sister     Other Brother         pulmonary embolism    Diabetes Daughter     Seizures Brother      Review of Systems   Constitutional: Positive for chills, diaphoresis and fever (subjective). Respiratory: Negative for cough and shortness of breath. Cardiovascular: Negative for chest pain, palpitations and leg swelling. Gastrointestinal: Negative for abdominal pain, constipation, nausea and vomiting. Skin: Positive for wound (back of right lower leg). Neurological: Negative for dizziness and headaches.        Vitals:    09/29/21 1303   BP: 118/70   Site: Left Upper Arm   Pulse: 80   Temp: 98.1 °F (36.7 °C)   Weight: 259 lb (117.5 kg)   Height: 6' 2\" (1.88 m)       Wt Readings from Last 3 Encounters:   09/29/21 259 lb (117.5 kg)   09/27/21 257 lb 3.2 oz (116.7 kg) 08/05/21 249 lb 1.9 oz (113 kg)       BP Readings from Last 3 Encounters:   09/29/21 118/70   09/27/21 104/68   08/23/21 110/72       Physical Exam  Vitals reviewed. Constitutional:       General: He is not in acute distress. Appearance: Normal appearance. He is obese. He is not ill-appearing or toxic-appearing. HENT:      Head: Normocephalic and atraumatic. Right Ear: External ear normal.      Left Ear: External ear normal.      Nose: Nose normal.   Cardiovascular:      Rate and Rhythm: Normal rate and regular rhythm. Pulses: Normal pulses. Heart sounds: Normal heart sounds. No murmur heard. No friction rub. No gallop. Pulmonary:      Effort: Pulmonary effort is normal. No respiratory distress. Breath sounds: Normal breath sounds. No wheezing. Abdominal:      General: Abdomen is flat. Bowel sounds are normal.      Tenderness: There is no abdominal tenderness. There is no guarding. Musculoskeletal:         General: No swelling or tenderness. Normal range of motion. Cervical back: Normal range of motion and neck supple. Skin:     General: Skin is warm and dry. Capillary Refill: Capillary refill takes less than 2 seconds. Findings: Abscess and lesion present. Neurological:      General: No focal deficit present. Mental Status: He is alert and oriented to person, place, and time. Psychiatric:         Mood and Affect: Mood normal.         Behavior: Behavior normal.         Thought Content: Thought content normal.         Judgment: Judgment normal.       Diagnostic data:  I have reviewed recent diagnostic testing including labs with the patient today. No results found for this visit on 09/29/21. Laboratory/imaging studies ordered this visit: None    The patient is in agreement with and verbalizes understanding of the plan of care today and has no additional questions or complaints.  The patient was instructed to follow-up in PRN or to contact our

## 2021-09-29 NOTE — ED TRIAGE NOTES
Pt in through ED lobby. The past 6 days he has had 3 circular wound on his right lower leg. On Monday he was placed on keflex and bactrim. He states he usually cannot feel anything below his knee due to neuropathy, but he can feel sharp pain at the wound site. He states his internal medicine provider sent him in for evaluation.

## 2021-09-29 NOTE — PROGRESS NOTES
Attending supervising physicians attestation statement:      I was present with the resident physician during the history and exam.  I Discussed the findings and plans with the resident physician  personally   After examining the patient, and agree with the resident'S note as outlined . Pt with RLE cellulitis and possible abscess, with an open lesion , indurated. Previous MRSA, currently on PO antbx . Last A1c > 8 , has risk factors, needs further work up   In the ER, they were called and updated.        Electronically signed by Roscoe Zuniga MD on 9/29/2021 at 2:02 PM

## 2021-09-29 NOTE — ED PROVIDER NOTES
[address] [facility] [address] [address] EMERGENCY DEPT    EMERGENCY MEDICINE     Pt Name: Michelle Montano  MRN: [id number]  Saadiagfceleste 1970  Date of evaluation: 9/29/2021  Provider: Mike Almaraz MD    CHIEF COMPLAINT       Chief Complaint   Patient presents with    Wound Check       HISTORY OF PRESENT ILLNESS    Michelle Montano is a pleasant 46 y.o. male who presents to the emergency department from home for wound check. He reports he has an area of pain and redness to R lateral distal calf for a week or two. He saw his doctor 2 days ago who prescribed bactrim and keflex, which he has been on for 1.5 days. He reports chills, but no fever, no vomiting, no cough/sob. He went to his doctor's office today who sent him here for further evaluation and treatment. He also reports it feels swollen. He reports there is occasional drainage from the area. He does not known what caused it. He denies any other complaints. He reports the sore hurts when he walks. He denies any other known exacerbating/relieving factors. Triage notes and Nursing notes were reviewed by myself. Any discrepancies are addressed above.     PAST MEDICAL HISTORY     Past Medical History:   Diagnosis Date    Abnormal thyroid biopsy     s/p left hemithyroidectomy 8/5542 - follicular adenoma    Acid reflux     Anemia     resolved - previously seen by Dr. Mahendra Brady (due to enlarged spleen)    Anesthesia     seizures with brain surgery due to blood sugar got really high    Bipolar disorder ([facility]     Blood clot in vein 04/07/2016    [facility]) 04/2019    thyroid    Chest pain     previously seeing [facility] cardiologist, now seeing Dr. Landy Mcnair (LAD bridging on cath 4/2016)    Chronic back pain     Chronic bronchitis ([address]     Dr. Ioana Celestin Colon polyp 08/30/2016    Depression     seeing [facility] DVT (deep venous thrombosis) ([facility] [address] [phone]    not on Coumadin due to unable to regulate - Dr. Juan Mendosa - no etiology found per patient    Esophageal abnormality     nodule     Fatty liver disease, nonalcoholic     U/S 31/0255 LMH    Furuncle     legs    History of Doppler ultrasound 05/29/2011    No hemodynamically significant carotid stenosis is identified. A thyroid nodule on each side. Dedicated ultrasound of thyroid gland is suggested to further evaluate if clinically indicated.  History of pulmonary embolism 2007    s/p GFF, related to knee surgery    Hx of blood clots     leg and lung    Hyperlipidemia     severely elevated triglycerides    Hypertension     diastolic    Intracranial arachnoid cyst 11/2012    Dr. Radha Mandujanoivers drained, complicated with seizures, DKA    Irritable bowel syndrome     Liver disease     Edu Pour - elevated LFT - positive smooth muscle antibody, steatosis per liver bx 3/2014 with Dr. Mirela Salgado (multiple drug resistant organisms) resistance 2012    MRSA (methicillin resistant staph aureus) culture positive     h/o in foot and before brain surgery    Nephrolithiasis     noted on CT abdomen 9/2016, 6/2019    Neuropathy     Pancreatic insufficiency     Positive SANDY (antinuclear antibody)     Dr. Arvin Stern - first visit in 6/2013    Prolonged emergence from general anesthesia     Restless legs syndrome     Seizures ([address]     started with brain surgery    Sinus tachycardia     Holter 3/2013 - seeing Dr. Leatha Monaco [facility])     clips     Sleep apnea     positive sleep apnea per study 10/2020.     Systolic dysfunction     \"systolic bridge\"  EF 84% ECHO 9/2019    Type II or unspecified type diabetes mellitus without mention of complication, not stated as uncontrolled 2007       SURGICAL HISTORY       Past Surgical History:   Procedure Laterality Date    CARDIAC CATHETERIZATION      2011,5-6 years ago   [address]  3-2012   [address]  04/28/2016    [facility]     CARPAL TUNNEL RELEASE  01/2017    CHOLECYSTECTOMY  2004    COLONOSCOPY  2012    COLONOSCOPY  08/2016 2 tubular adenomas - reportedly needs repeat scope in 6 months    CRANIOTOMY  11/2012    arachnoid cyst drainage    EKG 12-LEAD  10/18/2015         ENDOSCOPY, COLON, DIAGNOSTIC      HERNIA REPAIR Right 1996    [facility]--Dr. Julio César Pride INGUINAL HERNIA REPAIR Right 09/15/2016    Robotic assisted    KNEE ARTHROSCOPY Right 2016    KNEE SURGERY Left 2007    acl and debrided twice    OTHER SURGICAL HISTORY  5-31-11    Tilt table was associated w/ nonspecific symptoms or nausea, otherwise no significant dizziness or syncope. No significant hemodynamic changes. Otherwise, unremarkable tilt table test after 30 minutes of tilting.  OTHER SURGICAL HISTORY  01/20/2017    RIGHT SHOULDER ARTHROSCOPY, OPEN STAN, OPEN ACROMIOPLASTY, BICEP TENDESIS, RIGHT CARPAL TUNNEL RELEASE    SHOULDER SURGERY Right 01/2007    THYROID LOBECTOMY N/A 1/15/2021    THYROID LOBECTOMY, UVULOPALATOPHARYNGOPLAST LAOP performed by Diamante Guadalupe MD at [address] ECHOCARDIOGRAM  3-05-11    LV size and systolic function normal. EF 55-65%.      UPPER GASTROINTESTINAL ENDOSCOPY  2012    UPPER GASTROINTESTINAL ENDOSCOPY  2016    Dr. Sameer Puentes Left 10/22/2019    EGD DILATION SAVORY performed by Daquan Dawson MD at [facility] ENDOSCOPY Left 10/22/2019    EGD BIOPSY performed by Daquan Dawson MD at [address]  2007       CURRENT MEDICATIONS       Current Discharge Medication List      CONTINUE these medications which have NOT CHANGED    Details   cephALEXin (KEFLEX) 500 MG capsule Take 1 capsule by mouth 4 times daily  Qty: 40 capsule, Refills: 0    Associated Diagnoses: Cellulitis of right lower extremity      sulfamethoxazole-trimethoprim (BACTRIM DS;SEPTRA DS) 800-160 MG per tablet Take 1 tablet by mouth 2 times daily for 10 days  Qty: 20 tablet, Refills: 0    Associated Diagnoses: Cellulitis of right lower extremity      metoprolol tartrate (LOPRESSOR) 25 MG tablet Take 1 tablet by mouth 2 times daily  Qty: 60 tablet, Refills: 5      empagliflozin (JARDIANCE) 25 MG tablet Take 25 mg by mouth daily  Qty: 56 tablet, Refills: 0    Comments: Lot # [id number] expiration date 6/2023 received 8/23/2021      gabapentin (NEURONTIN) 600 MG tablet Take 600 mg by mouth 3 times daily.       LORazepam (ATIVAN) 0.5 MG tablet Lorazepam Active 0.5 MG PO Twice Daily as needed 0 August 18th, 2021 10:32am      promethazine (PHENERGAN) 12.5 MG tablet Take 12.5 mg by mouth every 6 hours as needed for Nausea      blood glucose test strips (ONETOUCH ULTRA) strip 1 each by In Vitro route 3 times daily DX: E11.65 Dispense Onetouch Ultra 2 test strips  Qty: 300 each, Refills: 3    Associated Diagnoses: Type 2 diabetes mellitus with diabetic polyneuropathy, with long-term current use of insulin (HCC)      insulin glargine (LANTUS) 100 UNIT/ML injection vial Inject 60 Units into the skin 2 times daily  Qty: 1 vial, Refills: 3      !! divalproex (DEPAKOTE) 500 MG DR tablet Take 1 tablet by mouth nightly  Qty: 90 tablet, Refills: 3      !! divalproex (DEPAKOTE) 250 MG DR tablet Take 1 tablet by mouth daily  Qty: 90 tablet, Refills: 3      levETIRAcetam (KEPPRA) 1000 MG tablet Take 2 tablets by mouth 2 times daily  Qty: 60 tablet, Refills: 3      zonisamide (ZONEGRAN) 100 MG capsule Take 5 capsules by mouth nightly  Qty: 30 capsule, Refills: 3      DULoxetine (CYMBALTA) 60 MG extended release capsule Take 1 capsule by mouth daily  Qty: 30 capsule, Refills: 3      lactulose (CHRONULAC) 10 GM/15ML solution Take 15 mLs by mouth 3 times daily  Qty: 2 Bottle, Refills: 1      insulin lispro (HUMALOG) 100 UNIT/ML injection vial Takes 10 units with meals plus group 2 sliding scale      pantoprazole (PROTONIX) 40 MG tablet Take 1 tablet by mouth 2 times daily  Qty: 180 tablet, Refills: 1    Associated Diagnoses: Gastroesophageal reflux disease without esophagitis vitamin D (ERGOCALCIFEROL) 1.25 MG (56551 UT) CAPS capsule Take 1 capsule by mouth once a week  Qty: 12 capsule, Refills: 1    Associated Diagnoses: Vitamin D deficiency      rOPINIRole (REQUIP) 1 MG tablet TAKE 1 TABLET BY MOUTH THREE TIMES A DAY  Qty: 270 tablet, Refills: 1    Associated Diagnoses: Restless legs syndrome (RLS)      lacosamide (VIMPAT) 100 MG TABS tablet Take 200 mg by mouth 2 times daily. sildenafil (VIAGRA) 100 MG tablet Take 1 tablet by mouth as needed for Erectile Dysfunction  Qty: 45 tablet, Refills: 3    Associated Diagnoses: Erectile dysfunction, unspecified erectile dysfunction type       !! - Potential duplicate medications found. Please discuss with provider.           ALLERGIES     Ciprofloxacin-ciproflox hcl er, Heparin, Metformin, Niacin and related, Tramadol hcl, Ultram [tramadol], and Warfarin    FAMILY HISTORY       Family History   Problem Relation Age of Onset    Heart Disease Father 61        MI    Stroke Brother     Obesity Brother     Arthritis Mother     Seizures Mother     Obesity Sister     Other Brother         pulmonary embolism    Diabetes Daughter     Seizures Brother         SOCIAL HISTORY       Social History     Socioeconomic History    Marital status:      Spouse name: Not on file    Number of children: 3    Years of education: Not on file    Highest education level: Not on file   Occupational History    Occupation: Disability since 2011   Tobacco Use    Smoking status: Never Smoker    Smokeless tobacco: Never Used   Vaping Use    Vaping Use: Never used   Substance and Sexual Activity    Alcohol use: No     Alcohol/week: 0.0 standard drinks    Drug use: No    Sexual activity: Not on file   Other Topics Concern    Not on file   Social History Narrative    Not on file     Social Determinants of Health     Financial Resource Strain: Medium Risk    Difficulty of Paying Living Expenses: Somewhat hard   Food Insecurity: No Food Insecurity    Worried About Running Out of Food in the Last Year: Never true    Jeffry of Food in the Last Year: Never true   Transportation Needs:     Lack of Transportation (Medical):  Lack of Transportation (Non-Medical):    Physical Activity:     Days of Exercise per Week:     Minutes of Exercise per Session:    Stress:     Feeling of Stress :    Social Connections:     Frequency of Communication with Friends and Family:     Frequency of Social Gatherings with Friends and Family:     Attends Nondenominational Services:     Active Member of Clubs or Organizations:     Attends Club or Organization Meetings:     Marital Status:    Intimate Partner Violence:     Fear of Current or Ex-Partner:     Emotionally Abused:     Physically Abused:     Sexually Abused:        REVIEW OF SYSTEMS     Review of Systems   Constitutional: Negative for chills and fever. Respiratory: Negative for cough and shortness of breath. Cardiovascular: Negative for chest pain and leg swelling. Gastrointestinal: Negative for abdominal pain and vomiting. Musculoskeletal: Negative for back pain and neck pain. Skin: Negative for rash and wound. Neurological: Negative for weakness and numbness. All other systems reviewed and are negative. Except as noted above the remainder of the review of systems was reviewed and is. PHYSICAL EXAM    (up to 7 for level 4, 8 or more for level 5)     ED Triage Vitals [09/29/21 1359]   BP Temp Temp Source Pulse Resp SpO2 Height Weight   (!) 121/94 97.9 °F (36.6 °C) Oral 80 18 95 % -- --       Physical Exam  Vitals and nursing note reviewed. Constitutional:       General: He is awake. HENT:      Head: Normocephalic and atraumatic. Mouth/Throat:      Mouth: Mucous membranes are moist.   Eyes:      General: Lids are normal.      Conjunctiva/sclera: Conjunctivae normal.   Neck:      Vascular: No JVD. Trachea: No tracheal deviation.    Cardiovascular:      Rate and Rhythm: Normal rate and regular rhythm. Pulses: Normal pulses. Heart sounds: No murmur heard. No friction rub. No gallop. Pulmonary:      Effort: Pulmonary effort is normal.      Breath sounds: Normal breath sounds. No wheezing, rhonchi or rales. Abdominal:      Palpations: Abdomen is soft. Tenderness: There is no abdominal tenderness. Musculoskeletal:      Cervical back: Neck supple. Skin:     General: Skin is warm. Findings: No rash. Comments: R distal lower lateral calf there is a 2.5 cm circular area of erythema with mild central fluctuance, no swelling, no LE edema, no posterior calf tenderness, no cords, 2+ DP pulses b/l, cap refill less than 2 seconds   Neurological:      General: No focal deficit present. Mental Status: He is alert. Sensory: No sensory deficit. Motor: No weakness. Psychiatric:         Behavior: Behavior is cooperative. DIAGNOSTIC RESULTS     EKG:(none if blank)  All [facility]'s [facility] who either signs or Co-signs this chart in the absence of a cardiologist.        RADIOLOGY: (none if blank)   Interpretation per the Radiologistbelow, if available at the time of this note:    XR TIBIA FIBULA RIGHT (2 VIEWS)   Final Result   No acute fractures or dislocation involving the right lower leg. **This report has been created using voice recognition software. It may contain minor errors which are inherent in voice recognition technology. **      Final report electronically signed by Dr Randa Ornelas on 9/29/2021 2:38 PM          LABS:  Labs Reviewed   COMPREHENSIVE METABOLIC PANEL W/ REFLEX TO MG FOR LOW K - Abnormal; Notable for the following components:       Result Value    Glucose 204 (*)     All other components within normal limits   GLOMERULAR FILTRATION RATE, ESTIMATED - Abnormal; Notable for the following components:    Est, Glom Filt Rate 79 (*)     All other components within normal limits CULTURE, BLOOD 1   CULTURE, BLOOD 2   CBC WITH AUTO DIFFERENTIAL   C-REACTIVE PROTEIN   ANION GAP       All other labs were within normal range or not returned as of this dictation. Please note, any cultures that may have been sent were not resulted at the time of this patient visit. EMERGENCY DEPARTMENT COURSE andMedical Decision Making:     MDM/   Pt presents to the ER with small area of redness to lower leg, +fluctuance on exam.  No sepsis, well appearing, nontoxic, will continue oral abx prescribed and follow up with pcp for recheck. Pt also counseled regarding need for f/u and ER return precautions. Strict returnprecautions and follow up instructions were discussed with the patient with which the patient agrees        ED Medications administered this visit:  Medications - No data to display      Incision/Drainage    Date/Time: 9/29/2021 4:50 PM  Performed by: Devaughn Noble MD  Authorized by: Devaughn Noble MD     Consent:     Consent obtained:  Verbal    Consent given by:  Patient    Risks discussed:  Bleeding, incomplete drainage, pain and infection    Alternatives discussed:  No treatment, observation and delayed treatment  Location:     Type:  Abscess    Location:  Lower extremity    Lower extremity location:  Leg    Leg location:  R lower leg  Pre-procedure details:     Skin preparation:  Chloraprep  Anesthesia (see MAR for exact dosages): Anesthesia method:  Local infiltration    Local anesthetic:  Lidocaine 1% w/o epi  Procedure type:     Complexity:  Simple  Procedure details:     Incision types:  Single straight    Incision depth:  Subcutaneous    Scalpel blade:  11    Wound management:  Probed and deloculated and irrigated with saline    Drainage:  Purulent and bloody    Drainage amount:  Scant    Wound treatment:  Wound left open    Packing materials:  None  Post-procedure details:     Patient tolerance of procedure:   Tolerated well, no immediate complications    : (None if blank)       CLINICAL       1.  Cellulitis and abscess of leg          DISPOSITION/PLAN   DISPOSITION Decision To Discharge 09/29/2021 04:53:19 PM      PATIENT REFERRED TO:  Evens Rowe MD  [facility], [address]  [address] [place] [address]  [phone]    In 1 day        DISCHARGE MEDICATIONS:  Current Discharge Medication List                 (Please note that portions of this note were completed with a voice recognition program.  Efforts were made to edit the dictations but occasionallywords are mis-transcribed.)      Vidhi Parsons MD,FACEP (electronically signed)  Attending Physician, Emergency Department       Mary Jo Gorman MD  09/29/21 [id number]

## 2021-10-05 LAB
BLOOD CULTURE, ROUTINE: NORMAL
BLOOD CULTURE, ROUTINE: NORMAL

## 2021-10-26 ENCOUNTER — TELEPHONE (OUTPATIENT)
Dept: INTERNAL MEDICINE CLINIC | Age: 51
End: 2021-10-26

## 2021-10-26 NOTE — TELEPHONE ENCOUNTER
Patient called requesting an antibiotic . Patient states that he has a new wound on the right leg in a area . Please advise .

## 2021-10-27 ENCOUNTER — TELEPHONE (OUTPATIENT)
Dept: INTERNAL MEDICINE CLINIC | Age: 51
End: 2021-10-27

## 2021-10-27 ENCOUNTER — OFFICE VISIT (OUTPATIENT)
Dept: INTERNAL MEDICINE CLINIC | Age: 51
End: 2021-10-27
Payer: MEDICARE

## 2021-10-27 ENCOUNTER — CLINICAL DOCUMENTATION (OUTPATIENT)
Dept: SPIRITUAL SERVICES | Facility: CLINIC | Age: 51
End: 2021-10-27

## 2021-10-27 ENCOUNTER — TELEPHONE (OUTPATIENT)
Dept: WOUND CARE | Age: 51
End: 2021-10-27

## 2021-10-27 VITALS
HEIGHT: 74 IN | WEIGHT: 255 LBS | SYSTOLIC BLOOD PRESSURE: 128 MMHG | TEMPERATURE: 97.9 F | HEART RATE: 76 BPM | DIASTOLIC BLOOD PRESSURE: 74 MMHG | BODY MASS INDEX: 32.73 KG/M2

## 2021-10-27 DIAGNOSIS — S81.801A OPEN WOUND OF RIGHT LOWER LEG, INITIAL ENCOUNTER: ICD-10-CM

## 2021-10-27 DIAGNOSIS — L97.401 DIABETIC ULCER OF MIDFOOT ASSOCIATED WITH DIABETES MELLITUS DUE TO UNDERLYING CONDITION, LIMITED TO BREAKDOWN OF SKIN, UNSPECIFIED LATERALITY (HCC): Primary | ICD-10-CM

## 2021-10-27 DIAGNOSIS — E08.621 DIABETIC ULCER OF MIDFOOT ASSOCIATED WITH DIABETES MELLITUS DUE TO UNDERLYING CONDITION, LIMITED TO BREAKDOWN OF SKIN, UNSPECIFIED LATERALITY (HCC): Primary | ICD-10-CM

## 2021-10-27 DIAGNOSIS — S31.103A NON-HEALING OPEN WOUND OF RIGHT GROIN, INITIAL ENCOUNTER: ICD-10-CM

## 2021-10-27 PROCEDURE — 3017F COLORECTAL CA SCREEN DOC REV: CPT | Performed by: INTERNAL MEDICINE

## 2021-10-27 PROCEDURE — G8484 FLU IMMUNIZE NO ADMIN: HCPCS | Performed by: INTERNAL MEDICINE

## 2021-10-27 PROCEDURE — 1036F TOBACCO NON-USER: CPT | Performed by: INTERNAL MEDICINE

## 2021-10-27 PROCEDURE — G8427 DOCREV CUR MEDS BY ELIG CLIN: HCPCS | Performed by: INTERNAL MEDICINE

## 2021-10-27 PROCEDURE — G8417 CALC BMI ABV UP PARAM F/U: HCPCS | Performed by: INTERNAL MEDICINE

## 2021-10-27 PROCEDURE — 99214 OFFICE O/P EST MOD 30 MIN: CPT | Performed by: INTERNAL MEDICINE

## 2021-10-27 RX ORDER — INSULIN GLARGINE 100 [IU]/ML
60 INJECTION, SOLUTION SUBCUTANEOUS 2 TIMES DAILY
COMMUNITY
End: 2022-07-13 | Stop reason: ALTCHOICE

## 2021-10-27 RX ORDER — SULFAMETHOXAZOLE AND TRIMETHOPRIM 800; 160 MG/1; MG/1
1 TABLET ORAL 2 TIMES DAILY
Qty: 28 TABLET | Refills: 0 | Status: SHIPPED | OUTPATIENT
Start: 2021-10-27 | End: 2021-11-10

## 2021-10-27 RX ORDER — CEPHALEXIN 250 MG/1
500 CAPSULE ORAL 3 TIMES DAILY
Qty: 30 CAPSULE | Refills: 0 | Status: ON HOLD | OUTPATIENT
Start: 2021-10-27 | End: 2021-11-16

## 2021-10-27 RX ORDER — DULOXETIN HYDROCHLORIDE 60 MG/1
120 CAPSULE, DELAYED RELEASE ORAL DAILY
COMMUNITY

## 2021-10-27 RX ORDER — DIVALPROEX SODIUM 250 MG/1
250 TABLET, DELAYED RELEASE ORAL 2 TIMES DAILY
Status: ON HOLD | COMMUNITY
End: 2021-12-01

## 2021-10-27 NOTE — ACP (ADVANCE CARE PLANNING)
Advance Care Planning   Ambulatory ACP Specialist Patient Outreach    Date:  10/27/2021  ACP Specialist:  Katy Canas    Outreach call to patient in follow-up to ACP Specialist referral from: Nancy Montano MD    [x] PCP  [] Provider   [] Ambulatory Care Management [] Other for Reason:    [x] Advance Directive Assistance  [] Code Status Discussion  [] Complete Portable DNR Order  [x] Discuss Goals of Care  [] Complete POST/MOST  [] Early ACP Decision-Making  [] Other    Date Referral Received: 8/23/2021    Today's Outreach:  [] First   [] Second  [x] Third                               Third outreach made by [x]  phone  [] email []   [employer]     Intervention:  [] Spoke with Patient  [] Left VM requesting return call      Outcome:   made a 3rd and final attempt to contact Jo Guzman via telephone call to offer support, if desired, in the completion of Advance Directives documents as part of an [address] conversation. * Per referral, \"end of life care. \"   * He expressed prior an interest in the completion of documents.  had mailed to them a copy of documents for review prior to completion. * No answer. Left message. *  sent a follow-up letter, brochures, and contact information to assist with moving forward. Next Step:   [] ACP scheduled conversation  [] Outreach again in one week               [] Email / Mail ACP Info Sheets  [] Email / Mail Advance Directive            [x] Close Referral. Routing closure to referring provider/staff and to ACP Specialist .      Thank you for this referral.

## 2021-10-27 NOTE — PROGRESS NOTES
[address] [facility] Internal Medicine   [facility] [address] OFFMICHELLE HIRSCH.CRISTINE, Mirza Marcos  Dept: [phone]  Dept Fax: [phone]    YOB: 1970    Cellulitis right leg     46 y.o. male   here for follow-up of above issues. He is a diabetic for 20 + yrs , normally followed by dr. Ana María Lu  Off today. No fever or chills, hx of falls due to balance issues, and hx of seizures no recent LOC. Recently  completed the oral antibiotics, here with spouse. Back in July he was admitted in the hospital for infection of the foot, had I/D of the left foot, healed up. Seen dr. David Cain ,podiatrist at [facility]. No recent surgery of the right leg. He is a diabetic, checks sugar 2-3 times per day, today am it was 169. Hx of MRSA  He also noted a wound of the right groin. Current Outpatient Medications   Medication Sig Dispense Refill    divalproex (DEPAKOTE) 250 MG DR tablet Take 250 mg by mouth 2 times daily      insulin glargine (BASAGLAR KWIKPEN) 100 UNIT/ML injection pen Inject 50 Units into the skin 2 times daily      DULoxetine (CYMBALTA) 60 MG extended release capsule Take 120 mg by mouth daily      metoprolol tartrate (LOPRESSOR) 25 MG tablet Take 1 tablet by mouth 2 times daily 60 tablet 5    empagliflozin (JARDIANCE) 25 MG tablet Take 25 mg by mouth daily 56 tablet 0    gabapentin (NEURONTIN) 600 MG tablet Take 600 mg by mouth 3 times daily.       LORazepam (ATIVAN) 0.5 MG tablet Lorazepam Active 0.5 MG PO Twice Daily as needed 0 August 18th, 2021 10:32am      promethazine (PHENERGAN) 12.5 MG tablet Take 12.5 mg by mouth every 6 hours as needed for Nausea      blood glucose test strips (ONETOUCH ULTRA) strip 1 each by In Vitro route 3 times daily DX: E11.65 Dispense Onetouch Ultra 2 test strips 300 each 3    levETIRAcetam (KEPPRA) 1000 MG tablet Take 2 tablets by mouth 2 times daily 60 tablet 3    zonisamide (ZONEGRAN) 100 MG capsule Take 5 capsules by mouth nightly 30 capsule 3    lactulose (CHRONULAC) 10 GM/15ML solution Take 15 mLs by mouth 3 times daily (Patient taking differently: Take 45 mLs by mouth 3 times daily ) 2 Bottle 1    insulin lispro (HUMALOG) 100 UNIT/ML injection vial Takes 10 units with meals plus group 2 sliding scale      pantoprazole (PROTONIX) 40 MG tablet Take 1 tablet by mouth 2 times daily 180 tablet 1    vitamin D (ERGOCALCIFEROL) 1.25 MG (36964 UT) CAPS capsule Take 1 capsule by mouth once a week 12 capsule 1    rOPINIRole (REQUIP) 1 MG tablet TAKE 1 TABLET BY MOUTH THREE TIMES A  tablet 1    lacosamide (VIMPAT) 100 MG TABS tablet Take 200 mg by mouth 2 times daily.  sildenafil (VIAGRA) 100 MG tablet Take 1 tablet by mouth as needed for Erectile Dysfunction 45 tablet 3     No current facility-administered medications for this visit. Past Medical History:   Diagnosis Date    Abnormal thyroid biopsy     s/p left hemithyroidectomy 4/3218 - follicular adenoma    Acid reflux     Anemia     resolved - previously seen by Dr. Yissel Sarah (due to enlarged spleen)    Anesthesia     seizures with brain surgery due to blood sugar got really high    Bipolar disorder ([facility]     Blood clot in vein 04/07/2016    [facility]     Cancer [facility]) 04/2019    thyroid    Chest pain     previously seeing [address] [place] [address] cardiologist, now seeing Dr. Narciso Reyes (LAD bridging on cath 4/2016)    Chronic back pain     Chronic bronchitis ([facility]     Dr. Corie Dillard Colon polyp 08/30/2016    Depression     seeing Anna House DVT (deep venous thrombosis) ([facility] 8320-1441    not on Coumadin due to unable to regulate - Dr. Hudson - no etiology found per patient    Esophageal abnormality     nodule     Fatty liver disease, nonalcoholic     U/S 11/8073 [facility]    Furuncle     legs    History of Doppler ultrasound 05/29/2011    No hemodynamically significant carotid stenosis is identified. A thyroid nodule on each side.  Dedicated ultrasound of thyroid gland is suggested to further evaluate if clinically indicated.  History of pulmonary embolism 2007    s/p GFF, related to knee surgery    Hx of blood clots     leg and lung    Hyperlipidemia     severely elevated triglycerides    Hypertension     diastolic    Intracranial arachnoid cyst 11/2012    Dr. Lucille Camara drained, complicated with seizures, DKA    Irritable bowel syndrome     Liver disease     Candi Pablo - elevated LFT - positive smooth muscle antibody, steatosis per liver bx 3/2014 with Dr. Edmond Herring (multiple drug resistant organisms) resistance 2012    MRSA (methicillin resistant staph aureus) culture positive     h/o in foot and before brain surgery    Nephrolithiasis     noted on CT abdomen 9/2016, 6/2019    Neuropathy     Pancreatic insufficiency     Positive SANDY (antinuclear antibody)     Dr. Tonya Peguero - first visit in 6/2013    Prolonged emergence from general anesthesia     Restless legs syndrome     Seizures ([address]     started with brain surgery    Sinus tachycardia     Holter 3/2013 - seeing Dr. Krys Alejo fracture [facility])     clips     Sleep apnea     positive sleep apnea per study 10/2020.     Systolic dysfunction     \"systolic bridge\"  EF 75% ECHO 9/2019    Type II or unspecified type diabetes mellitus without mention of complication, not stated as uncontrolled 2007       Social History     Socioeconomic History    Marital status:      Spouse name: Not on file    Number of children: 3    Years of education: Not on file    Highest education level: Not on file   Occupational History    Occupation: Disability since 2011   Tobacco Use    Smoking status: Never Smoker    Smokeless tobacco: Never Used   Vaping Use    Vaping Use: Never used   Substance and Sexual Activity    Alcohol use: No     Alcohol/week: 0.0 standard drinks    Drug use: No    Sexual activity: Not on file   Other Topics Concern    Not on file   Social History Narrative    Not on file     Social Determinants of Health Financial Resource Strain: Medium Risk    Difficulty of Paying Living Expenses: Somewhat hard   Food Insecurity: No Food Insecurity    Worried About Running Out of Food in the Last Year: Never true    Jeffry of Food in the Last Year: Never true   Transportation Needs:     Lack of Transportation (Medical):      Lack of Transportation (Non-Medical):    Physical Activity:     Days of Exercise per Week:     Minutes of Exercise per Session:    Stress:     Feeling of Stress :    Social Connections:     Frequency of Communication with Friends and Family:     Frequency of Social Gatherings with Friends and Family:     Attends [employer] Services:     Active Member of Clubs or Organizations:     Attends Club or Organization Meetings:     Marital Status:    Intimate Partner Violence:     Fear of Current or Ex-Partner:     Emotionally Abused:     Physically Abused:     Sexually Abused:        Family History   Problem Relation Age of Onset    Heart Disease Father 61        MI    Stroke Brother     Obesity Brother     Arthritis Mother     Seizures Mother     Obesity Sister     Other Brother         pulmonary embolism    Diabetes Daughter     Seizures Brother        Allergies   Allergen Reactions    Ciprofloxacin-Ciproflox Hcl Er Other (See Comments)     Elevated creatinine    Heparin      Monitor for thrombocytopenia    Metformin Nausea Only     Abdominal pain, diarrhea    Niacin And Related Other (See Comments)     Burning sensation, severe flushing    Tramadol Hcl      Contraindication to seizure medications    Ultram [Tramadol]      Contraindication to seizure medications    Warfarin      Very difficult to regulate in past       Review of Systems     /74 (Site: Left Upper Arm, Position: Sitting, Cuff Size: Medium Adult)   Pulse 76   Temp 97.9 °F (36.6 °C)   Ht 6' 2\" (1.88 m)   Wt 255 lb (115.7 kg)   BMI 32.74 kg/m²      Physical Examination: General appearance - patient alert, ULTRA) strip 1 each by In Vitro route 3 times daily DX: E11.65 Dispense Onetouch Ultra 2 test strips 300 each 3    levETIRAcetam (KEPPRA) 1000 MG tablet Take 2 tablets by mouth 2 times daily 60 tablet 3    zonisamide (ZONEGRAN) 100 MG capsule Take 5 capsules by mouth nightly 30 capsule 3    lactulose (CHRONULAC) 10 GM/15ML solution Take 15 mLs by mouth 3 times daily (Patient taking differently: Take 45 mLs by mouth 3 times daily ) 2 Bottle 1    insulin lispro (HUMALOG) 100 UNIT/ML injection vial Takes 10 units with meals plus group 2 sliding scale      pantoprazole (PROTONIX) 40 MG tablet Take 1 tablet by mouth 2 times daily 180 tablet 1    vitamin D (ERGOCALCIFEROL) 1.25 MG (53267 UT) CAPS capsule Take 1 capsule by mouth once a week 12 capsule 1    rOPINIRole (REQUIP) 1 MG tablet TAKE 1 TABLET BY MOUTH THREE TIMES A  tablet 1    lacosamide (VIMPAT) 100 MG TABS tablet Take 200 mg by mouth 2 times daily.  sildenafil (VIAGRA) 100 MG tablet Take 1 tablet by mouth as needed for Erectile Dysfunction 45 tablet 3    [DISCONTINUED] cephALEXin (KEFLEX) 500 MG capsule Take 1 capsule by mouth 4 times daily 40 capsule 0    [DISCONTINUED] insulin glargine (LANTUS) 100 UNIT/ML injection vial Inject 60 Units into the skin 2 times daily (Patient taking differently: Inject 50 Units into the skin 2 times daily ) 1 vial 3    [DISCONTINUED] divalproex (DEPAKOTE) 500 MG DR tablet Take 1 tablet by mouth nightly (Patient taking differently: Take 250 mg by mouth nightly ) 90 tablet 3    [DISCONTINUED] divalproex (DEPAKOTE) 250 MG DR tablet Take 1 tablet by mouth daily (Patient taking differently: Take 250 mg by mouth 2 times daily ) 90 tablet 3    [DISCONTINUED] DULoxetine (CYMBALTA) 60 MG extended release capsule Take 1 capsule by mouth daily (Patient taking differently: Take 120 mg by mouth daily ) 30 capsule 3     No facility-administered encounter medications on file as of 10/27/2021.         Controlled Substances Monitoring: Will schedule follow up appointment in 4 weeks. Plan:    Hx of recurrent ulcers with DM-2, will start him on keflex and bactrim DS noted his recent labs , and he tolerated them well. And refer him to wound clinic./ Dr Josi Gil. Pt prefers to stay on this side, referred to [address] [place] [address]. Hx of MRSA, and uses Hibiclens,   Personal hygene was recommended. He needs to consume yogurt daily x 2-3 weeks due to use of antibiotics, and above d/w both of them today. He is not toxic, does not need IV antibiotics or ER evaluation at this time. Encouraged them to seek medical attention ASAP if it gets worsen. Keflex and bactrim was prescribed today for 2 weeks. Signed by: Shay Rahman M.D.     [address] [facility] Internal Medicine  [address], [address]  [address] [facility], [facility]    Tel  [phone]   Fax [phone]

## 2021-10-27 NOTE — TELEPHONE ENCOUNTER
I called the wound clinic to refer José Antonio Staples to them for open wound on left leg. They state he sees Dr Jarred Mays for his foot wound and Dr Kacy Tello treats wounds from knee down and he needs to call Dr Kacy Tello for this. If Dr Kacy Tello wants him to see wound clinic then Dr Kacy Tello needs to send a referral.  I called [facility] regarding this and he will call Dr Jarred Mays for appt. He will let us know if anything else is needed.

## 2021-11-01 ENCOUNTER — TELEPHONE (OUTPATIENT)
Dept: WOUND CARE | Age: 51
End: 2021-11-01

## 2021-11-04 ENCOUNTER — OFFICE VISIT (OUTPATIENT)
Dept: NEUROLOGY | Age: 51
End: 2021-11-04
Payer: MEDICARE

## 2021-11-04 VITALS
HEIGHT: 74 IN | DIASTOLIC BLOOD PRESSURE: 94 MMHG | TEMPERATURE: 97.1 F | SYSTOLIC BLOOD PRESSURE: 140 MMHG | WEIGHT: 263 LBS | HEART RATE: 86 BPM | BODY MASS INDEX: 33.75 KG/M2

## 2021-11-04 DIAGNOSIS — R56.9 PARTIAL SEIZURE (HCC): Primary | ICD-10-CM

## 2021-11-04 DIAGNOSIS — G40.219 PARTIAL SYMPTOMATIC EPILEPSY WITH COMPLEX PARTIAL SEIZURES, INTRACTABLE, WITHOUT STATUS EPILEPTICUS (HCC): ICD-10-CM

## 2021-11-04 PROCEDURE — G8427 DOCREV CUR MEDS BY ELIG CLIN: HCPCS | Performed by: NURSE PRACTITIONER

## 2021-11-04 PROCEDURE — 3017F COLORECTAL CA SCREEN DOC REV: CPT | Performed by: NURSE PRACTITIONER

## 2021-11-04 PROCEDURE — G8417 CALC BMI ABV UP PARAM F/U: HCPCS | Performed by: NURSE PRACTITIONER

## 2021-11-04 PROCEDURE — 99214 OFFICE O/P EST MOD 30 MIN: CPT | Performed by: NURSE PRACTITIONER

## 2021-11-04 PROCEDURE — 1036F TOBACCO NON-USER: CPT | Performed by: NURSE PRACTITIONER

## 2021-11-04 PROCEDURE — G8484 FLU IMMUNIZE NO ADMIN: HCPCS | Performed by: NURSE PRACTITIONER

## 2021-11-04 NOTE — PROGRESS NOTES
[facility]            [address]          [facility], [address] [facility]          Dept: [phone]          Dept Fax: [phone]    MD Luis Cruz MD Ahmed B. Merilyn Estelle, MD Neila Lipoma, MD Rene Beets, CNP            11/4/2021      HISTORY OF PRESENT ILLNESS:       I had the pleasure of seeing Linsey Campos, who returns for continuing neurologic care. The patient was seen last in the hospital for treatment of toxic metabolic encephalopathy. The patient was previously seen in the hospital by Dr. Roselyn Thorne and all of the records and diagnostic studies were carefully reviewed. The patient had a craniotomy for resection of an arachnoid cyst in 2012 and began to have intractable seizures post craniotomy. The patient was recently seen at [address] [place] [address] on July 20, 2021 when he presented for right lower extremity cellulitis. While he was in the hospital he began to develop encephalopathy and an EEG completed at that time showed right sided slowing that may be epileptogenic in nature. [facility]ME to rule out subclinical seizures. LTME completed at this time showed toxic metabolic encephalopathy. For seizure prophylaxis he is prescribed depakote 250 twice daily, keppra 2000 mg twice daily, zonegran 500 mg daily and vimpat 200 mg twice daily. During his hospitalization his depakote was increased and he was then found to be hyperammonemic . He is currently having 1-2 complex partial seizures per week that are described his \"eyes rolling\". He has previously been lamictal and dilantin. He has also been having recent episodes of tremor. These tremors are typically present when he is exerting himself but can also be present when he is at rest. He notes that his ammonia level is currently well controlled. He notes that his tremor does cause pain. This is currently happening daily.   During his visit he was having continuous shaking of his right hand. Again, this is intermittent and is not changed whether at rest or with exertion. Of note, the patient has been under the care of Dr. Nannette Phillips in [place]. He has had multiple LTM E studies, but typically there have been no events when he has had the studies. There has been a suggestion of nonepileptic seizures, however there has been no follow-up with the neurologist in [place]. They were advised to seek another opinion here in [facility]. Testing reviewed:    LTME 7/26/2021-7-30/2021  Impression     Abnormal continuous  EEG with evidence of diffuse generalized disturbance in electrocortical function.  The overall pattern has not seen evolution over the course of this recording  There may be toxic metabolic components and it is noted the patient has hyperammonemia which can be seen in the context of valproic acid usage    CBC:         Recent Labs     07/31/21 0758 08/01/21  0537 08/02/21  0608   WBC 7.4 7.6 7.9   HGB 14.0 14.1 14.1    263 320      BMP:          Recent Labs     07/31/21  0758 08/01/21  0537 08/02/21  0608    142 138   K 3.6* 3.4* 4.0   * 109* 107   CO2 22 20 20   BUN 6 6 10   CREATININE 0.64* 0.56* 0.60*   GLUCOSE 182* 142* 165*                  Lab Results   Component Value Date     CHOL 147 12/29/2020     HDL 26 12/29/2020     TRIG 246 (H) 04/06/2021     ALT 46 (H) 07/29/2021     AST 22 07/29/2021     TSH 1.72 07/28/2021     INR 1.08 01/23/2021     GLUF 283 (H) 10/17/2015     LABA1C 8.3 (H) 07/20/2021     LABMICR < 1.20 12/29/2020     [id number] 768 04/21/2021               Diagnostic data reviewed:       ammonia level:          PAST MEDICAL HISTORY:         Diagnosis Date    Abnormal thyroid biopsy     s/p left hemithyroidectomy 4/5732 - follicular adenoma    Acid reflux     Anemia     resolved - previously seen by Dr. Cherrie Quiroz (due to enlarged spleen)    Anesthesia     seizures with brain surgery due to blood sugar got really high    Bipolar disorder ([facility]     Blood clot in vein 04/07/2016    Vernadine Rebecca     Cancer [facility]) 04/2019    thyroid    Chest pain     previously seeing [facility] cardiologist, now seeing Dr. Kelsie Jolly (LAD bridging on cath 4/2016)    Chronic back pain     Chronic bronchitis ([facility]     Dr. Camilo Kelly Colon polyp 08/30/2016    Depression     seeing Carlos Gamble DVT (deep venous thrombosis) ([facility] [phone]    not on Coumadin due to unable to regulate - Dr. Helio Infante - no etiology found per patient    Esophageal abnormality     nodule     Fatty liver disease, nonalcoholic     U/S 52/[facility]    [facility]     legs    History of Doppler ultrasound 05/29/2011    No hemodynamically significant carotid stenosis is identified. A thyroid nodule on each side. Dedicated ultrasound of thyroid gland is suggested to further evaluate if clinically indicated.      History of pulmonary embolism 2007    s/p GFF, related to knee surgery    Hx of blood clots     leg and lung    Hyperlipidemia     severely elevated triglycerides    Hypertension     diastolic    Intracranial arachnoid cyst 11/2012    Dr. Jarrod Ordaz drained, complicated with seizures, DKA    Irritable bowel syndrome     Liver disease     Chloévickie Shell - elevated LFT - positive smooth muscle antibody, steatosis per liver bx 3/2014 with Dr. David Galeas (multiple drug resistant organisms) resistance 2012    MRSA (methicillin resistant staph aureus) culture positive     h/o in foot and before brain surgery    Nephrolithiasis     noted on CT abdomen 9/2016, 6/2019    Neuropathy     Pancreatic insufficiency     Positive SANDY (antinuclear antibody)     Dr. Belle Samaniego - first visit in 6/2013    Prolonged emergence from general anesthesia     Restless legs syndrome     Seizures ([facility]     started with brain surgery    Sinus tachycardia     Holter 3/2013 - [facility] Skull fracture [facility])     clips     Sleep apnea     positive sleep apnea per study 10/2020.  Systolic dysfunction     \"systolic bridge\"  EF 70% ECHO 9/2019    Type II or unspecified type diabetes mellitus without mention of complication, not stated as uncontrolled 2007        PAST SURGICAL HISTORY:         Procedure Laterality Date    CARDIAC CATHETERIZATION      2011,5-6 years ago   [address]  3-2012   [address]  04/28/2016    [facility]     CARPAL TUNNEL RELEASE  01/2017    CHOLECYSTECTOMY  2004    COLONOSCOPY  2012    COLONOSCOPY  08/2016    2 tubular adenomas - reportedly needs repeat scope in 6 months    CRANIOTOMY  11/2012    arachnoid cyst drainage    EKG 12-LEAD  10/18/2015         ENDOSCOPY, COLON, DIAGNOSTIC      HERNIA REPAIR Right 1996    [facility]--Dr. Domenico Head INGUINAL HERNIA REPAIR Right 09/15/2016    Robotic assisted    KNEE ARTHROSCOPY Right 2016    KNEE SURGERY Left 2007    acl and debrided twice    OTHER SURGICAL HISTORY  5-31-11    Tilt table was associated w/ nonspecific symptoms or nausea, otherwise no significant dizziness or syncope. No significant hemodynamic changes. Otherwise, unremarkable tilt table test after 30 minutes of tilting.  OTHER SURGICAL HISTORY  01/20/2017    RIGHT SHOULDER ARTHROSCOPY, OPEN STAN, OPEN ACROMIOPLASTY, BICEP TENDESIS, RIGHT CARPAL TUNNEL RELEASE    SHOULDER SURGERY Right 01/2007    THYROID LOBECTOMY N/A 1/15/2021    THYROID LOBECTOMY, UVULOPALATOPHARYNGOPLAST LAOP performed by Dorys Veras MD at [address] ECHOCARDIOGRAM  3-05-11    LV size and systolic function normal. EF 55-65%.      UPPER GASTROINTESTINAL ENDOSCOPY  2012    UPPER GASTROINTESTINAL ENDOSCOPY  2016    Dr. Alyssa Baird Left 10/22/2019    EGD DILATION SAVORY performed by Bennett Mccollum MD at [address] [facility] ENDOSCOPY Left 10/22/2019    EGD BIOPSY performed by Bennett Mccollum MD at [facility] End[facility]   Rhoda 2007        SOCIAL HISTORY:     Social History     Socioeconomic History    Marital status:      Spouse name: Not on file    Number of children: 3    Years of education: Not on file    Highest education level: Not on file   Occupational History    Occupation: Disability since 2011   Tobacco Use    Smoking status: Never Smoker    Smokeless tobacco: Never Used   Vaping Use    Vaping Use: Never used   Substance and Sexual Activity    Alcohol use: No     Alcohol/week: 0.0 standard drinks    Drug use: No    Sexual activity: Not on file   Other Topics Concern    Not on file   Social History Narrative    Not on file     Social Determinants of Health     Financial Resource Strain: Medium Risk    Difficulty of Paying Living Expenses: Somewhat hard   Food Insecurity: No Food Insecurity    Worried About Running Out of Food in the Last Year: Never true    Jeffry of Food in the Last Year: Never true   Transportation Needs:     Lack of Transportation (Medical):      Lack of Transportation (Non-Medical):    Physical Activity:     Days of Exercise per Week:     Minutes of Exercise per Session:    Stress:     Feeling of Stress :    Social Connections:     Frequency of Communication with Friends and Family:     Frequency of Social Gatherings with Friends and Family:     Attends Zoroastrian Services:     Active Member of Clubs or Organizations:     Attends Club or Organization Meetings:     Marital Status:    Intimate Partner Violence:     Fear of Current or Ex-Partner:     Emotionally Abused:     Physically Abused:     Sexually Abused:        CURRENT MEDICATIONS:     Current Outpatient Medications   Medication Sig Dispense Refill    divalproex (DEPAKOTE) 250 MG DR tablet Take 250 mg by mouth 2 times daily      insulin glargine (BASAGLAR KWIKPEN) 100 UNIT/ML injection pen Inject 50 Units into the skin 2 times daily      DULoxetine (CYMBALTA) 60 MG extended release capsule Take 120 mg by mouth daily      cephALEXin (KEFLEX) 250 MG capsule Take 2 capsules by mouth 3 times daily 30 capsule 0    sulfamethoxazole-trimethoprim (BACTRIM DS;SEPTRA DS) 800-160 MG per tablet Take 1 tablet by mouth 2 times daily for 14 days 28 tablet 0    metoprolol tartrate (LOPRESSOR) 25 MG tablet Take 1 tablet by mouth 2 times daily 60 tablet 5    empagliflozin (JARDIANCE) 25 MG tablet Take 25 mg by mouth daily 56 tablet 0    gabapentin (NEURONTIN) 600 MG tablet Take 600 mg by mouth 3 times daily.  LORazepam (ATIVAN) 0.5 MG tablet Lorazepam Active 0.5 MG PO Twice Daily as needed 0 August 18th, 2021 10:32am      promethazine (PHENERGAN) 12.5 MG tablet Take 12.5 mg by mouth every 6 hours as needed for Nausea      blood glucose test strips (ONETOUCH ULTRA) strip 1 each by In Vitro route 3 times daily DX: E11.65 Dispense Onetouch Ultra 2 test strips 300 each 3    levETIRAcetam (KEPPRA) 1000 MG tablet Take 2 tablets by mouth 2 times daily 60 tablet 3    zonisamide (ZONEGRAN) 100 MG capsule Take 5 capsules by mouth nightly 30 capsule 3    lactulose (CHRONULAC) 10 GM/15ML solution Take 15 mLs by mouth 3 times daily (Patient taking differently: Take 45 mLs by mouth 3 times daily ) 2 Bottle 1    insulin lispro (HUMALOG) 100 UNIT/ML injection vial Takes 10 units with meals plus group 2 sliding scale      pantoprazole (PROTONIX) 40 MG tablet Take 1 tablet by mouth 2 times daily 180 tablet 1    vitamin D (ERGOCALCIFEROL) 1.25 MG (24045 UT) CAPS capsule Take 1 capsule by mouth once a week 12 capsule 1    rOPINIRole (REQUIP) 1 MG tablet TAKE 1 TABLET BY MOUTH THREE TIMES A  tablet 1    lacosamide (VIMPAT) 100 MG TABS tablet Take 200 mg by mouth 2 times daily.  sildenafil (VIAGRA) 100 MG tablet Take 1 tablet by mouth as needed for Erectile Dysfunction 45 tablet 3     No current facility-administered medications for this visit.         ALLERGIES:     Allergies   Allergen Reactions    Ciprofloxacin-Ciproflox Hcl Er Other (See Comments)     Elevated creatinine    Heparin      Monitor for thrombocytopenia    Metformin Nausea Only     Abdominal pain, diarrhea    Niacin And Related Other (See Comments)     Burning sensation, severe flushing    Tramadol Hcl      Contraindication to seizure medications    Ultram [Tramadol]      Contraindication to seizure medications    Warfarin      Very difficult to regulate in past                                 REVIEW OF SYSTEMS        All items selected indicate a positive finding. Those items not selected are negative.   Constitutional [] Weight loss/gain   [] Fatigue  [] Fever/Chills   HEENT [] Hearing Loss  [] Visual Disturbance  [] Tinnitus  [] Eye pain   Respiratory [] Shortness of Breath  [] Cough  [] Snoring   Cardiovascular [] Chest Pain  [] Palpitations  [] Lightheaded   GI [] Constipation  [] Diarrhea  [] Swallowing change  [] Nausea/vomiting    [] Urinary Frequency  [] Urinary Urgency   Musculoskeletal [] Neck pain  [] Back pain  [] Muscle pain  [] Restless legs   Dermatologic [] Skin changes   Neurologic [] Memory loss/confusion  [x] Seizures  [] Trouble walking or imbalance  [] Dizziness  [] Sleep disturbance  [] Weakness  [] Numbness  [x] Tremors  [] Speech Difficulty  [] Headaches  [] Light Sensitivity  [] Sound Sensitivity   Endocrinology []Excessive thirst  []Excessive hunger   Psychiatric [] Anxiety/Depression  [] Hallucination   Allergy/immunology []Hives/environmental allergies   Hematologic/lymph [] Abnormal bleeding  [] Abnormal bruising         PHYSICAL EXAMINATION:       Vitals:    11/04/21 1517   BP: (!) 140/94   Pulse: 86   Temp:                                               .                                                                                                    General Appearance:  Alert, cooperative, no signs of distress, appears stated age   Head:  Normocephalic, no signs of trauma   Eyes:  Conjunctiva/corneas clear;  eyelids intact   Ears: Normal external ear and canals   Nose: Nares normal, mucosa normal, no drainage    Throat: Lips and tongue normal; teeth normal;  gums normal   Neck: Supple, intact flexion, extension and rotation;   trachea midline;  no adenopathy;   thyroid: not enlarged;   no carotid pulse abnormality   Back:   Symmetric, no curvature, ROM adequate   Lungs:   Respirations unlabored   Heart:  Regular rate and rhythm           Extremities: Extremities normal, no cyanosis, no edema   Pulses: Symmetric over head and neck   Skin: Skin color, texture normal, no rashes, no lesions                                     NEUROLOGIC EXAMINATION    Neurologic Exam  Mental status    Alert and oriented x 3; intact memory with no confusion, speech or language problems; no hallucinations or delusions  Fund of information appropriate for level of education    Cranial nerves    II - visual fields intact to confrontation bilaterally  III, IV, VI  extra-ocular muscles full: no pupillary defect; no KARIS, no nystagmus, no ptosis   V - normal facial sensation                                                               VII - normal facial symmetry                                                             VIII - intact hearing                                                                             IX, X - symmetrical palate                                                                  XI - symmetrical shoulder shrug                                                       XII - tongue midline without atrophy or fasciculation      Motor function  Normal muscle bulk and tone; strength 5/5 on all 4 extremities, no pronator drift      Sensory function Intact to light touch, pinprick, vibration, proprioception on all 4 extremities      Cerebellar Intact fine motor movement. No involuntary movements or tremors. No ataxia or dysmetria on finger to nose or heel to shin testing      Reflex function DTR 2+ on bilateral UE and LE, symmetric.  Down going toes bilaterally      Gait                   normal base and arm swing                  Medical Decision Making: In summary, your patient, Kiya Bailey exhibits the following, with associated plan:    1. History of a seizure disorder currently having 1-2 seizures/week. The patient has previously been on dilantin and lamictal. He is also having episodes of a tremor that is suspected to be a possible focal seizure. This occurs on a daily basis  1. Continue depakote 250 mg twice daily, stop day prior to LTME per Dr. João Link  2. Continue keppra 2000 mg twice daily  3. Continue vimpat 200 mg twice daily  4. Continue zonegran 500 mg at bedtime daily  5. Obtain LTME by Dr. João Link as he is now having daily possible focal onset seizures  6. Depending on the results of the LTME, if his events are actually epileptic, we could consider possibly a vagus nerve stimulation  7. Return for follow up visit after completing LTME            Signed: Samir Haskins CNP      *Please note that portions of this note were completed with a voice recognition program.  Although every effort was made to insure the accuracy of this automated transcription, some errors in transcription may have occurred, occasionally words and are mis-transcribed    Provider Attestation: The documentation recorded by the scribe accurately reflects the service I personally performed and the decisions made by myself. Portions of this note were transcribed by a scribe. I personally performed the history, physical exam, and medical decision-making and confirm the accuracy of the information in the transcribed note. Scribe Attestation:   By signing my name below, Jorge Lkenyetta Mckeea, attest that this documentation has been prepared under the direction and in the presence of Samir Haskins CNP.

## 2021-11-10 ENCOUNTER — HOSPITAL ENCOUNTER (OUTPATIENT)
Dept: WOUND CARE | Age: 51
Discharge: HOME | DRG: 101 | End: 2021-11-10
Payer: MEDICARE

## 2021-11-10 VITALS
TEMPERATURE: 97.7 F | DIASTOLIC BLOOD PRESSURE: 84 MMHG | HEART RATE: 81 BPM | OXYGEN SATURATION: 94 % | SYSTOLIC BLOOD PRESSURE: 135 MMHG

## 2021-11-10 DIAGNOSIS — R11.2 NON-INTRACTABLE VOMITING WITH NAUSEA, UNSPECIFIED VOMITING TYPE: ICD-10-CM

## 2021-11-10 DIAGNOSIS — Z22.322 MRSA (METHICILLIN RESISTANT STAPHYLOCOCCUS AUREUS) COLONIZATION: Primary | ICD-10-CM

## 2021-11-10 DIAGNOSIS — Z86.14 MRSA INFECTION WITHIN LAST 3 MONTHS: ICD-10-CM

## 2021-11-10 DIAGNOSIS — T14.8XXA NONHEALING NONSURGICAL WOUND: ICD-10-CM

## 2021-11-10 PROCEDURE — 87147 CULTURE TYPE IMMUNOLOGIC: CPT

## 2021-11-10 PROCEDURE — 6370000000 HC RX 637 (ALT 250 FOR IP): Performed by: NURSE PRACTITIONER

## 2021-11-10 PROCEDURE — 87077 CULTURE AEROBIC IDENTIFY: CPT

## 2021-11-10 PROCEDURE — 97597 DBRDMT OPN WND 1ST 20 CM/<: CPT | Performed by: NURSE PRACTITIONER

## 2021-11-10 PROCEDURE — 99203 OFFICE O/P NEW LOW 30 MIN: CPT | Performed by: NURSE PRACTITIONER

## 2021-11-10 PROCEDURE — 87186 SC STD MICRODIL/AGAR DIL: CPT

## 2021-11-10 PROCEDURE — 87205 SMEAR GRAM STAIN: CPT

## 2021-11-10 PROCEDURE — 99213 OFFICE O/P EST LOW 20 MIN: CPT

## 2021-11-10 PROCEDURE — 87070 CULTURE OTHR SPECIMN AEROBIC: CPT

## 2021-11-10 RX ORDER — LIDOCAINE 40 MG/G
CREAM TOPICAL ONCE
Status: CANCELLED | OUTPATIENT
Start: 2021-11-10 | End: 2021-11-10

## 2021-11-10 RX ORDER — LIDOCAINE HYDROCHLORIDE 40 MG/ML
SOLUTION TOPICAL ONCE
Status: CANCELLED | OUTPATIENT
Start: 2021-11-10 | End: 2021-11-10

## 2021-11-10 RX ORDER — GINSENG 100 MG
CAPSULE ORAL ONCE
Status: CANCELLED | OUTPATIENT
Start: 2021-11-10 | End: 2021-11-10

## 2021-11-10 RX ORDER — CLOBETASOL PROPIONATE 0.5 MG/G
OINTMENT TOPICAL ONCE
Status: CANCELLED | OUTPATIENT
Start: 2021-11-10 | End: 2021-11-10

## 2021-11-10 RX ORDER — LIDOCAINE HYDROCHLORIDE 20 MG/ML
JELLY TOPICAL ONCE
Status: CANCELLED | OUTPATIENT
Start: 2021-11-10 | End: 2021-11-10

## 2021-11-10 RX ORDER — BACITRACIN ZINC AND POLYMYXIN B SULFATE 500; 1000 [USP'U]/G; [USP'U]/G
OINTMENT TOPICAL ONCE
Status: CANCELLED | OUTPATIENT
Start: 2021-11-10 | End: 2021-11-10

## 2021-11-10 RX ORDER — MUPIROCIN CALCIUM 20 MG/G
CREAM TOPICAL
Qty: 1 EACH | Refills: 0 | Status: SHIPPED | OUTPATIENT
Start: 2021-11-10 | End: 2021-11-12

## 2021-11-10 RX ORDER — BETAMETHASONE DIPROPIONATE 0.05 %
OINTMENT (GRAM) TOPICAL ONCE
Status: CANCELLED | OUTPATIENT
Start: 2021-11-10 | End: 2021-11-10

## 2021-11-10 RX ORDER — LIDOCAINE 50 MG/G
OINTMENT TOPICAL ONCE
Status: CANCELLED | OUTPATIENT
Start: 2021-11-10 | End: 2021-11-10

## 2021-11-10 RX ORDER — BACITRACIN, NEOMYCIN, POLYMYXIN B 400; 3.5; 5 [USP'U]/G; MG/G; [USP'U]/G
OINTMENT TOPICAL ONCE
Status: CANCELLED | OUTPATIENT
Start: 2021-11-10 | End: 2021-11-10

## 2021-11-10 RX ORDER — GENTAMICIN SULFATE 1 MG/G
OINTMENT TOPICAL ONCE
Status: CANCELLED | OUTPATIENT
Start: 2021-11-10 | End: 2021-11-10

## 2021-11-10 RX ORDER — LIDOCAINE HYDROCHLORIDE 20 MG/ML
JELLY TOPICAL ONCE
Status: COMPLETED | OUTPATIENT
Start: 2021-11-10 | End: 2021-11-10

## 2021-11-10 RX ADMIN — LIDOCAINE HYDROCHLORIDE: 20 JELLY TOPICAL at 15:31

## 2021-11-10 NOTE — PLAN OF CARE
Problem: Wound:  Goal: Will show signs of wound healing; wound closure and no evidence of infection  Description: Will show signs of wound healing; wound closure and no evidence of infection  11/10/2021 1605 by Iain Carolina RN  Outcome: Ongoing     Patient presents to wound clinic for right leg wound. See avs for discharge instructions. Follow up visit: 1 week November 19th at 1:30 pm     Care plan reviewed with patient. Patient verbalize understanding of the plan of care and contribute to goal setting.

## 2021-11-10 NOTE — PROGRESS NOTES
[facility]  Consult and Procedure Note      [facility] RECORD NUMBER:  [id number]  AGE: 46 y.o. GENDER: male  : 1970  EPISODE DATE:  11/10/2021    SUBJECTIVE:     Chief Complaint   Patient presents with    Wound Check     right leg          HISTORY OF PRESENT ILLNESS      Michelle Montano is a 46 y.o. male who presents today for evaluation of right lower extremity wound. History obtained from both patient and his wife, present for visit. Patient has history of MRSA abscess on 3 occasions since 2021. Most recent infection to right lateral leg started approximately 3 weeks ago. He is currently finishing a 2 week course of Bactrim for this problem. He states that wound is improving but continues to be painful. No drainage reported. Patient has history or seizures, uncontrolled at this time. History of DM, uncontrolled since he started having ongoing troubles with seizures and infection. He has been leaving wound open to air. He denies any fever or chills. No further needs or concerns identified.     PAST MEDICAL HISTORY             Diagnosis Date    Abnormal thyroid biopsy     s/p left hemithyroidectomy 8287 - follicular adenoma    Acid reflux     Anemia     resolved - previously seen by Dr. Mahendra Brady (due to enlarged spleen)    Anesthesia     seizures with brain surgery due to blood sugar got really high    Bipolar disorder ([facility]     Blood clot in vein 2016    Kaylah Clement     Cancer [facility]) 2019    thyroid    Chest pain     previously seeing [address] [place] [address] cardiologist, now seeing Dr. Landy Mcnair (LAD bridging on cath 2016)    Chronic back pain     Chronic bronchitis ([address]     Dr. Ioana Celestin Colon polyp 2016    Depression     seeing Tulia Matheus DVT (deep venous thrombosis) ([facility] [phone]    not on Coumadin due to unable to regulate - Dr. Juan Mendosa - no etiology found per patient    Esophageal abnormality     nodule     Fatty liver disease, nonalcoholic     [place] 77/0045 [facility]    Furuncle     legs    History of Doppler ultrasound 05/29/2011    No hemodynamically significant carotid stenosis is identified. A thyroid nodule on each side. Dedicated ultrasound of thyroid gland is suggested to further evaluate if clinically indicated.  History of pulmonary embolism 2007    s/p GFF, related to knee surgery    Hx of blood clots     leg and lung    Hyperlipidemia     severely elevated triglycerides    Hypertension     diastolic    Intracranial arachnoid cyst 11/2012    Dr. Kelly Mendiola drained, complicated with seizures, DKA    Irritable bowel syndrome     Liver disease     Jennifer Mc - elevated LFT - positive smooth muscle antibody, steatosis per liver bx 3/2014 with Dr. Darshana Krueger (multiple drug resistant organisms) resistance 2012    MRSA (methicillin resistant staph aureus) culture positive     h/o in foot and before brain surgery    Nephrolithiasis     noted on CT abdomen 9/2016, 6/2019    Neuropathy     Pancreatic insufficiency     Positive SANDY (antinuclear antibody)     Dr. Rosalinda Lyle - first visit in 6/2013    Prolonged emergence from general anesthesia     Restless legs syndrome     Seizures ([address]     started with brain surgery    Sinus tachycardia     Holter 3/2013 - seeing Dr. London Solo fracture [facility])     clips     Sleep apnea     positive sleep apnea per study 10/2020.     Systolic dysfunction     \"systolic bridge\"  EF 18% ECHO 9/2019    Type II or unspecified type diabetes mellitus without mention of complication, not stated as uncontrolled 2007       PAST SURGICAL HISTORY     Past Surgical History:   Procedure Laterality Date    CARDIAC CATHETERIZATION      2011,5-6 years ago   Ashtyn Pulse  3-2012    CARDIAC CATHETERIZATION  04/28/2016    [facility]     CARPAL TUNNEL RELEASE  01/2017    CHOLECYSTECTOMY  2004    COLONOSCOPY  2012    COLONOSCOPY  08/2016    2 tubular adenomas - reportedly needs repeat scope in 6 months    CRANIOTOMY  11/2012    arachnoid cyst drainage    EKG 12-LEAD  10/18/2015         ENDOSCOPY, COLON, DIAGNOSTIC      HERNIA REPAIR Right 1996    [facility]--Dr. Haylie Giang    INGUINAL HERNIA REPAIR Right 09/15/2016    Robotic assisted    KNEE ARTHROSCOPY Right 2016    KNEE SURGERY Left 2007    acl and debrided twice    OTHER SURGICAL HISTORY  5-31-11    Tilt table was associated w/ nonspecific symptoms or nausea, otherwise no significant dizziness or syncope. No significant hemodynamic changes. Otherwise, unremarkable tilt table test after 30 minutes of tilting.  OTHER SURGICAL HISTORY  01/20/2017    RIGHT SHOULDER ARTHROSCOPY, OPEN STAN, OPEN ACROMIOPLASTY, BICEP TENDESIS, RIGHT CARPAL TUNNEL RELEASE    SHOULDER SURGERY Right 01/2007    THYROID LOBECTOMY N/A 1/15/2021    THYROID LOBECTOMY, UVULOPALATOPHARYNGOPLAST LAOP performed by Timi Asher MD at [facility] ECHOCARDIOGRAM  3-05-11    LV size and systolic function normal. EF 55-65%.      UPPER GASTROINTESTINAL ENDOSCOPY  2012    UPPER GASTROINTESTINAL ENDOSCOPY  2016    Dr. Celestino Brower Left 10/22/2019    EGD DILATION SAVORY performed by Guillermina Espinoza MD at [facility] Left 10/22/2019    EGD BIOPSY performed by Guillermina Espinoza MD at [address]  2007       FAMILY HISTORY     Family History   Problem Relation Age of Onset    Heart Disease Father 61        MI    Stroke Brother     Obesity Brother     Arthritis Mother     Seizures Mother     Obesity Sister     Other Brother         pulmonary embolism    Diabetes Daughter     Seizures Brother        SOCIAL HISTORY     Social History     Tobacco Use    Smoking status: Never Smoker    Smokeless tobacco: Never Used   Vaping Use    Vaping Use: Never used   Substance Use Topics    Alcohol use: No     Alcohol/week: 0.0 standard drinks    Drug use: No       ALLERGIES     Allergies   Allergen Reactions    Ciprofloxacin-Ciproflox Hcl Er Other (See Comments)     Elevated creatinine    Heparin      Monitor for thrombocytopenia    Metformin Nausea Only     Abdominal pain, diarrhea    Niacin And Related Other (See Comments)     Burning sensation, severe flushing    Tramadol Hcl      Contraindication to seizure medications    Ultram [Tramadol]      Contraindication to seizure medications    Warfarin      Very difficult to regulate in past       MEDICATIONS     Current Outpatient Medications on File Prior to Encounter   Medication Sig Dispense Refill    divalproex (DEPAKOTE) 250 MG DR tablet Take 250 mg by mouth 2 times daily      insulin glargine (BASAGLAR KWIKPEN) 100 UNIT/ML injection pen Inject 50 Units into the skin 2 times daily      DULoxetine (CYMBALTA) 60 MG extended release capsule Take 120 mg by mouth daily      cephALEXin (KEFLEX) 250 MG capsule Take 2 capsules by mouth 3 times daily 30 capsule 0    sulfamethoxazole-trimethoprim (BACTRIM DS;SEPTRA DS) 800-160 MG per tablet Take 1 tablet by mouth 2 times daily for 14 days 28 tablet 0    metoprolol tartrate (LOPRESSOR) 25 MG tablet Take 1 tablet by mouth 2 times daily 60 tablet 5    empagliflozin (JARDIANCE) 25 MG tablet Take 25 mg by mouth daily 56 tablet 0    gabapentin (NEURONTIN) 600 MG tablet Take 600 mg by mouth 3 times daily.       LORazepam (ATIVAN) 0.5 MG tablet Lorazepam Active 0.5 MG PO Twice Daily as needed 0 August 18th, 2021 10:32am      promethazine (PHENERGAN) 12.5 MG tablet Take 12.5 mg by mouth every 6 hours as needed for Nausea      blood glucose test strips (ONETOUCH ULTRA) strip 1 each by In Vitro route 3 times daily DX: E11.65 Dispense Onetouch Ultra 2 test strips 300 each 3    levETIRAcetam (KEPPRA) 1000 MG tablet Take 2 tablets by mouth 2 times daily 60 tablet 3    zonisamide (ZONEGRAN) 100 MG capsule Take 5 capsules by mouth nightly 30 capsule 3    lactulose non-traumatic in appearance. Capillary refill less than 3 seconds. Pulses palpable  Skin:  Warm and dry  Wound:    Right lateral leg wound bed shows dry eschar, no drainage pre-debridement. Periwound skin erythematous, edematous, indurated. No fluctuance or warmth noted. Wound 07/27/21 Heel Left (Active)   Number of days: 106       Wound 07/27/21 Leg Right; Lower; Lateral (Active)   Number of days: 106       Wound 11/10/21 Leg Right; Lateral (Active)   Wound Image   11/10/21 1507   Wound Cleansed Cleansed with saline 11/10/21 1507   Dressing/Treatment Alginate with Ag; Silicone border 97/41/39 1507   Wound Length (cm) 1.4 cm 11/10/21 1507   Wound Width (cm) 1.3 cm 11/10/21 1507   Wound Depth (cm) 0 cm 11/10/21 1507   Wound Surface Area (cm^2) 1.82 cm^2 11/10/21 1507   Wound Volume (cm^3) 0 cm^3 11/10/21 1507   Wound Assessment Eschar dry 11/10/21 1507   Drainage Amount None 11/10/21 1507   Odor None 11/10/21 1507   Anabella-wound Assessment Induration; Blanchable erythema 11/10/21 1507   Margins Attached edges 11/10/21 1507   Number of days: 0       Wound 11/10/21 Groin Right (Active)   Wound Image   11/10/21 1512   Wound Cleansed Cleansed with saline 11/10/21 1512   Offloading for Diabetic Foot Ulcers No offloading required 11/10/21 1512   Wound Length (cm) 0.3 cm 11/10/21 1512   Wound Width (cm) 0.7 cm 11/10/21 1512   Wound Depth (cm) 0 cm 11/10/21 1512   Wound Surface Area (cm^2) 0.21 cm^2 11/10/21 1512   Wound Volume (cm^3) 0 cm^3 11/10/21 1512   Wound Assessment Eschar dry 11/10/21 1512   Drainage Amount None 11/10/21 1512   Odor None 11/10/21 1512   Anabella-wound Assessment Blanchable erythema; Induration 11/10/21 1512   Number of days: 0         LABS/IMAGING     UA: No results for input(s): SPECGRAV, PHUR, COLORU, CLARITYU, MUCUS, PROTEINU, BLOODU, RBCUA, WBCUA, BACTERIA, NITRU, GLUCOSEU, BILIRUBINUR, UROBILINOGEN, KETUA, LABCAST, LABCASTTY, AMORPHOS in the last 72 hours.     Invalid input(s): CRYSTALS  Micro: Lab Results   Component Value Date    BC No growth-preliminary No growth  09/29/2021     ASSESSMENT     -Nonhealing wound, right leg  -History frequent MRSA infection    Ulcer Identification:  Ulcer Type: non-healing/non-surgical  Contributing Factors: diabetes and poor glucose control    Depth of Diabetic/Pressure/Non Pressure Ulcers or Wound:  Wound, full thickness     Patient Active Problem List   Diagnosis Code    Depression F32. A    Hypertension I10    Liver disease K76.9    Restless legs syndrome G25.81    Chronic back pain M54.9, G89.29    Other chest pain R07.89    Irritable bowel syndrome K58.9    Non compliance w medication regimen Z91.14    Hypertension associated with diabetes ([facility] E11.59, I15.2    Hyperlipidemia with target LDL less than 100 E78.5    IBS (irritable bowel syndrome) K58.9    Sinus tachycardia R00.0    Confusion R41.0    Encephalopathy G93.40    Diabetes mellitus type II, uncontrolled ([facility]) E11.65    Dizziness R42    Neuropathy G62.9    Hyperammonemia ([facility]) E72.20    Medication overdose, insulin T50.901A    Suicide attempt ([facility]) T14.91XA    Hypoglycemia E16.2    Hypokalemia E87.6    Lithium toxicity T56.891A    Nausea & vomiting R11.2    Snoring R06.83    Hypogonadism [id number]    Erectile dysfunction due to diseases classified elsewhere N52.1    Rash R21    Headaches due to old head injury G44.309, S09.90XS    Abdominal wall pain in left lower quadrant R10.32    Bipolar affective disorder ([facility] F31.9    Hypogonadism in male E29.1    Type 2 diabetes mellitus with diabetic neuropathy ([facility]) E11.40    Hypomagnesemia E83.42    Partial seizure ([facility] R56.9    Syncope and collapse R55    Headache disorder R51.9    Partial symptomatic epilepsy with complex partial seizures, not intractable, without status epilepticus ([facility]) G40.209    Respiratory acidosis E87.2    Essential hypertension I10    Gastritis K29.70    Gastroesophageal reflux disease K21.9    Mixed hyperlipidemia E78.2    Acute lateral meniscus tear of right knee S83.281A    Recurrent right inguinal hernia K40.91    Bicipital tendinitis of right shoulder M75.21    Carpal tunnel syndrome of right wrist G56.01    Obesity (BMI 30-39. 9) E66.9    Lump of axilla R22.30    Familial hypercholesterolemia E78.01    Coronary artery disease of native artery of native heart with stable angina pectoris ([facility]) I25.118    Exertional dyspnea R06.00    Vitamin D deficiency E55.9    Seizure ([facility]) R56.9    Nausea and vomiting R11.2    Type II diabetes mellitus with manifestations, uncontrolled ([facility]) E11.8, E11.65    Chondromalacia of knee, right M94.261    Effusion of left knee M25.462    Elevated levels of transaminase & lactic acid dehydrogenase R74.01, R74.02    Other intervertebral disc degeneration, lumbar region M51.36    Palpitations R00.2    Splenomegaly R16.1    Sprain of right foot S93.601A    Steatohepatitis K75.81    Osteopenia M85.80    Obstructive sleep apnea X41.64    Follicular neoplasm of thyroid D49.7    Nasal obstruction J34.89    Nasal septal deviation J34.2    Chest pain R07.9    MISTI (obstructive sleep apnea) G47.33    Acute post-operative pain G89.18    Bipolar 1 disorder, depressed ([facility]) F31.9    Status post uvulopalatopharyngoplasty Z98.890    Status post partial thyroidectomy E89.0    Wound infection T14. 8XXA, L08.9    Diabetic leg ulcer ([address] E11.622, L97.909    Seizures ([address] R56.9    Partial symptomatic epilepsy with complex partial seizures, intractable, without status epilepticus ([address] G40.219    Episodic confusion R41.0    Acute metabolic encephalopathy G38.01    MRSA infection within last 3 months Z86.14    Nonhealing nonsurgical wound T14. 8XXA       PROCEDURE NOTE     Indications:  Based on my examination of this patient's wound(s)/ulcer(s) today, debridement is required to promote healing and evaluate the extent healing. Performed by:  Oscar Curtis NICANOR Shields - CNP    Consent obtained: Yes    Time out taken:  Yes    Pain control: lidocaine gel    Debridement:Non-excisional Debridement    Using curette the wound(s)/ulcer(s) was/were sharply debrided down through and including the removal of epidermis and dermis. Devitalized Tissue Debrided:  necrotic/eschar    Pre Debridement Measurements:  Are located in the Wound/Ulcer Documentation Flow Sheet    Wound/Ulcer #: 1    Post Debridement Measurements:    Wound/Ulcer Descriptions are listed under Physical Exam above. Wound/Ulcer Descriptions are Pre Debridement except measurements    Percent of Wound/Ulcer Debrided: 100%    Total Surface Area Debrided:  1.82 sq cm     Bleeding:  None    Hemostasis Achieved:  not needed    Procedural Pain:  5  / 10     Post Procedural Pain:  0 / 10     Response to treatment:  With complaints of pain. Post debridement wound bed appears granular with moderate amounts of serosanguinous drainage noted. PLAN     Patient examined and evaluated. All available lab work, radiology studies, and progress notes pertaining to Tabatha Ordaz reviewed prior to or during patient visit today.    -Nonhealing wound, right leg- Debridement of dry eschar completed today. Culture of wound taken today as area continues to be painful, erythematous, and edematous despite almost being completed with Bactrim. Recommend continuing Bactrim at this time. Will call with culture results and any further treatment recommendations.     -Wound care ordered as follows:  Right leg- Apply silver alginate to wound. Cover with silicone bordered foam. Change three times weekly. -Recurrent MRSA infections- Start MRSA suppression. Mupirocin and Chlorhexidine wash daily x5 days. Prescription sent to pharmacy today. Discussed importance of deep cleaning home after 24-48 hours of initiation to help prevent re-colonization.   Patient may need long term antibiotics if he continues to have recurrent abscess formation. Further recommendations pending culture results and response to care.     -Education provided on signs and symptoms of worsening infection. Call clinic or seek emergency care should these occur. Follow up 1 week. Call clinic with any needs or concerns prior to scheduled visit. All questions and concerns addressed prior to discharge from today's visit. Please see attached Discharge Instructions    Written patient dismissal instructions given to patient and signed by patient or POA. Treatment:   Orders Placed This Encounter   Procedures    Culture, Aerobic     Standing Status:   Standing     Number of Occurrences:   1    Culture, Aerobic     Standing Status:   Standing     Number of Occurrences:   1     I spent a total of 35 minutes reviewing previous notes, test results and face to face with Nikhil Saleh  on 11/10/2021. Greater than 50% of this time was spent on counseling, reviewing and discussing test results, answering questions, instructions on treatment and/or medications ordered, and coordinating the care based on my plan and assessment as noted. Discharge Instructions         Discharge Instructions       Visit Discharge/Physician Orders:  - Will order supplies for you today through [employer].   - Continue antibiotics as prescribed. - MRSA suppression protocol. Will send into pharmacy today. - Continue Chlorhexidine daily with showers. - Debridement done today. - Culture taken today. Will notify you of results. Wound Location: Right leg     Dressing orders:     1) Gather wound care supplies and arrange on clean table. 2) Wash your hands with soap and water or use alcohol based hand  for 20 seconds (sing \"Happy Birthday\" twice). 3) Cleanse wounds with normal saline or wound cleanser and gauze. Pat dry with clean gauze. 4) Right leg- Apply silver alginate to wound.  Cover with silicone bordered foam. Change three times weekly. Keep all dressings clean & dry. Do not shower, take baths or get wound wet, unless otherwise instructed by your Wound Care doctor. Follow up visit: 1 week November 19th at 1:30 pm     Keep next scheduled appointment. Please give 24 hour notice if unable to keep appointment. [phone]    If you experience any of the following, please call the Wound Care Service during business hours: Monday through Friday 8:00 am - 4:30 pm  ([phone]). *Increase in pain   *Temperature over 101   *Increase in drainage from your wound or a foul odor   *Uncontrolled swelling   *Need for compression bandage changes due to slippage, breakthrough drainage    If you need medical attention outside of business hours, please contact your Primary Care Doctor or go to the nearest emergency room.                            Electronically signed by NICANOR Pollack CNP on 11/10/2021 at 5:02 PM

## 2021-11-10 NOTE — PROGRESS NOTES
[address] f: [phone] f: [phone] p: [phone] [email or web address]      Ordering Center:   [place] [address] [id number] [id number] [phone]  WOUND CARE Dept: [phone]   [facility] NUMBER [phone]    Patient Information:      Lupe Hooker  [address]  [address] [place] [address] [address] [facility]   [phone]   : 1970  AGE: 46 y.o. GENDER: male   EPISODE DATE: 11/10/2021    Insurance:      PRIMARY INSURANCE:  Plan: MEDICARE PART A AND B  Coverage: MEDICARE  Effective Date: 2015  Group Number: [unfilled]  Subscriber Number: [id number] - (Medicare)    Payor/Plan Subscr  Sex Relation Sub. Ins. ID Effective Group Num   1. MEDICARE - [facility] 83 M 1970 Male Self [id number] 1/1/15                                    [address]        Patient Wound Information:      Problem List Items Addressed This Visit     None          WOUNDS REQUIRING DRESSING SUPPLIES:     Wound 21 Heel Left (Active)   Number of days: 106       Wound 21 Leg Right; Lower; Lateral (Active)   Number of days: 106       Wound 11/10/21 Leg Right; Lateral (Active)   Wound Image   11/10/21 1507   Wound Cleansed Cleansed with saline 11/10/21 1507   Dressing/Treatment Alginate with Ag; Silicone border  1507   Wound Length (cm) 1.4 cm 11/10/21 1507   Wound Width (cm) 1.3 cm 11/10/21 1507   Wound Depth (cm) 0 cm 11/10/21 1507   Wound Surface Area (cm^2) 1.82 cm^2 11/10/21 1507   Wound Volume (cm^3) 0 cm^3 11/10/21 1507   Wound Assessment Eschar dry 11/10/21 1507   Drainage Amount None 11/10/21 1507   Odor None 11/10/21 1507   Anabella-wound Assessment Induration; Blanchable erythema 11/10/21 1507   Margins Attached edges 11/10/21 1507   Number of days: 0       Wound 11/10/21 Groin Right (Active)   Wound Image   11/10/21 1512   Wound Cleansed Cleansed with saline 11/10/21 1512   Offloading for Diabetic Foot Ulcers No offloading required 11/10/21 1512   Wound Length (cm) 0.3 cm 11/10/21 1512   Wound Width (cm) 0.7 cm 11/10/21 1512   Wound Depth (cm) 0 cm 11/10/21 1512   Wound Surface Area (cm^2) 0.21 cm^2 11/10/21 1512   Wound Volume (cm^3) 0 cm^3 11/10/21 1512   Wound Assessment Eschar dry 11/10/21 1512   Drainage Amount None 11/10/21 1512   Odor None 11/10/21 1512   Anabella-wound Assessment Blanchable erythema; Induration 11/10/21 1512   Number of days: 0     Incision 01/15/21 Throat (Active)   Number of days: 299       Incision 01/15/21 (Active)   Number of days: 299       Supplies Requested :      WOUND #: 1   PRIMARY DRESSING:  Alginate with silver pad   Cover and Secure with:  Other 4x4 silicone bordered foam     FREQUENCY OF DRESSING CHANGES:  Three times per week       ADDITIONAL ITEMS:  [] Gloves Small  [] Gloves Medium [] Gloves Large [] Gloves XLarge  [] Tape 1\" [] Tape 2\" [] Tape 3\"  [] Medipore Tape  [x] Saline  [] Skin Prep   [] Adhesive Remover   [] Cotton Tip Applicators   [x] Other: sterile 4x4 gauze    Patient Wound(s) Debrided: [x] Yes if yes please add date 11/10/2021   [] No    Debribement Type: Non-excisional/Selective    Patient currently being seen by Home Health: [] Yes   [x] No     Duration for needed supplies:  []15  [x]30  []60  []90 Days    Electronically signed by Ankita Rice RN on 11/10/2021 at 4:07 PM     Provider Information:      Stefania Garcia NAME: Jaskaran Christensen CNP     NPI: [id number]

## 2021-11-11 RX ORDER — PROMETHAZINE HYDROCHLORIDE 12.5 MG/1
12.5-25 TABLET ORAL EVERY 8 HOURS PRN
Qty: 60 TABLET | Refills: 1 | OUTPATIENT
Start: 2021-11-11 | End: 2022-02-21 | Stop reason: SDUPTHER

## 2021-11-12 ENCOUNTER — HOSPITAL ENCOUNTER (INPATIENT)
Age: 51
LOS: 2 days | Discharge: HOME | DRG: 101 | End: 2021-11-14
Attending: FAMILY MEDICINE | Admitting: PEDIATRICS
Payer: MEDICARE

## 2021-11-12 ENCOUNTER — APPOINTMENT (OUTPATIENT)
Dept: CT IMAGING | Age: 51
DRG: 101 | End: 2021-11-12
Payer: MEDICARE

## 2021-11-12 ENCOUNTER — APPOINTMENT (OUTPATIENT)
Dept: GENERAL RADIOLOGY | Age: 51
DRG: 101 | End: 2021-11-12
Payer: MEDICARE
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
## 2021-11-12 DIAGNOSIS — R41.82 ALTERED MENTAL STATUS, UNSPECIFIED ALTERED MENTAL STATUS TYPE: Primary | ICD-10-CM

## 2021-11-12 DIAGNOSIS — G40.919 BREAKTHROUGH SEIZURE (HCC): ICD-10-CM

## 2021-11-12 DIAGNOSIS — S80.819A ABRASION, LEG W/O INFECTION: ICD-10-CM

## 2021-11-12 LAB
AEROBIC CULTURE: ABNORMAL
AEROBIC CULTURE: ABNORMAL
ALBUMIN SERPL-MCNC: 4.5 G/DL (ref 3.5–5.1)
ALP BLD-CCNC: 96 U/L (ref 38–126)
ALT SERPL-CCNC: 24 U/L (ref 11–66)
AMMONIA: 37 UMOL/L (ref 11–60)
ANION GAP SERPL CALCULATED.3IONS-SCNC: 10 MEQ/L (ref 8–16)
AST SERPL-CCNC: 16 U/L (ref 5–40)
BASOPHILS # BLD: 0.5 %
BASOPHILS ABSOLUTE: 0 THOU/MM3 (ref 0–0.1)
BETA-HYDROXYBUTYRATE: 0.84 MG/DL (ref 0.2–2.81)
BILIRUB SERPL-MCNC: 0.5 MG/DL (ref 0.3–1.2)
BILIRUBIN URINE: NEGATIVE
BLOOD, URINE: NEGATIVE
BUN BLDV-MCNC: 12 MG/DL (ref 7–22)
CALCIUM SERPL-MCNC: 8.8 MG/DL (ref 8.5–10.5)
CHARACTER, URINE: CLEAR
CHLORIDE BLD-SCNC: 105 MEQ/L (ref 98–111)
CO2: 24 MEQ/L (ref 23–33)
COLOR: YELLOW
CREAT SERPL-MCNC: 0.8 MG/DL (ref 0.4–1.2)
EOSINOPHIL # BLD: 3.2 %
EOSINOPHILS ABSOLUTE: 0.2 THOU/MM3 (ref 0–0.4)
ERYTHROCYTE [DISTWIDTH] IN BLOOD BY AUTOMATED COUNT: 13.1 % (ref 11.5–14.5)
ERYTHROCYTE [DISTWIDTH] IN BLOOD BY AUTOMATED COUNT: 41.6 FL (ref 35–45)
GFR SERPL CREATININE-BSD FRML MDRD: > 90 ML/MIN/1.73M2
GLUCOSE BLD-MCNC: 196 MG/DL (ref 70–108)
GLUCOSE URINE: NEGATIVE MG/DL
GRAM STAIN RESULT: ABNORMAL
HCT VFR BLD CALC: 46.2 % (ref 42–52)
HEMOGLOBIN: 16.5 GM/DL (ref 14–18)
IMMATURE GRANS (ABS): 0.1 THOU/MM3 (ref 0–0.07)
IMMATURE GRANULOCYTES: 1.3 %
INR BLD: 1.01 (ref 0.85–1.13)
KETONES, URINE: NEGATIVE
LACTIC ACID, SEPSIS: 1.5 MMOL/L (ref 0.5–1.9)
LACTIC ACID, SEPSIS: 2 MMOL/L (ref 0.5–1.9)
LEUKOCYTE ESTERASE, URINE: NEGATIVE
LYMPHOCYTES # BLD: 32.8 %
LYMPHOCYTES ABSOLUTE: 2.5 THOU/MM3 (ref 1–4.8)
MCH RBC QN AUTO: 31.5 PG (ref 26–33)
MCHC RBC AUTO-ENTMCNC: 35.7 GM/DL (ref 32.2–35.5)
MCV RBC AUTO: 88.2 FL (ref 80–94)
MONOCYTES # BLD: 6.8 %
MONOCYTES ABSOLUTE: 0.5 THOU/MM3 (ref 0.4–1.3)
NITRITE, URINE: NEGATIVE
NUCLEATED RED BLOOD CELLS: 0 /100 WBC
ORGANISM: ABNORMAL
OSMOLALITY CALCULATION: 282.7 MOSMOL/KG (ref 275–300)
PH UA: 6.5 (ref 5–9)
PLATELET # BLD: 233 THOU/MM3 (ref 130–400)
PMV BLD AUTO: 10.1 FL (ref 9.4–12.4)
POTASSIUM REFLEX MAGNESIUM: 4.1 MEQ/L (ref 3.5–5.2)
PRO-BNP: 20.5 PG/ML (ref 0–900)
PROTEIN UA: NEGATIVE
RBC # BLD: 5.24 MILL/MM3 (ref 4.7–6.1)
SARS-COV-2, NAAT: NOT  DETECTED
SEG NEUTROPHILS: 55.4 %
SEGMENTED NEUTROPHILS ABSOLUTE COUNT: 4.2 THOU/MM3 (ref 1.8–7.7)
SODIUM BLD-SCNC: 139 MEQ/L (ref 135–145)
SPECIFIC GRAVITY, URINE: 1.02 (ref 1–1.03)
TOTAL PROTEIN: 7.5 G/DL (ref 6.1–8)
TROPONIN T: < 0.01 NG/ML
UROBILINOGEN, URINE: 1 EU/DL (ref 0–1)
VALPROIC ACID LEVEL: 17.5 UG/ML (ref 50–100)
WBC # BLD: 7.5 THOU/MM3 (ref 4.8–10.8)

## 2021-11-12 PROCEDURE — 82010 KETONE BODYS QUAN: CPT

## 2021-11-12 PROCEDURE — 85025 COMPLETE CBC W/AUTO DIFF WBC: CPT

## 2021-11-12 PROCEDURE — 80053 COMPREHEN METABOLIC PANEL: CPT

## 2021-11-12 PROCEDURE — 99223 1ST HOSP IP/OBS HIGH 75: CPT | Performed by: PEDIATRICS

## 2021-11-12 PROCEDURE — 99285 EMERGENCY DEPT VISIT HI MDM: CPT

## 2021-11-12 PROCEDURE — 1200000003 HC TELEMETRY R&B

## 2021-11-12 PROCEDURE — 83880 ASSAY OF NATRIURETIC PEPTIDE: CPT

## 2021-11-12 PROCEDURE — 87635 SARS-COV-2 COVID-19 AMP PRB: CPT

## 2021-11-12 PROCEDURE — 70450 CT HEAD/BRAIN W/O DYE: CPT

## 2021-11-12 PROCEDURE — 93005 ELECTROCARDIOGRAM TRACING: CPT | Performed by: FAMILY MEDICINE

## 2021-11-12 PROCEDURE — 85610 PROTHROMBIN TIME: CPT

## 2021-11-12 PROCEDURE — 96360 HYDRATION IV INFUSION INIT: CPT

## 2021-11-12 PROCEDURE — 84484 ASSAY OF TROPONIN QUANT: CPT

## 2021-11-12 PROCEDURE — 80164 ASSAY DIPROPYLACETIC ACD TOT: CPT

## 2021-11-12 PROCEDURE — 2580000003 HC RX 258: Performed by: FAMILY MEDICINE

## 2021-11-12 PROCEDURE — 71045 X-RAY EXAM CHEST 1 VIEW: CPT

## 2021-11-12 PROCEDURE — 82140 ASSAY OF AMMONIA: CPT

## 2021-11-12 PROCEDURE — 81003 URINALYSIS AUTO W/O SCOPE: CPT

## 2021-11-12 PROCEDURE — 83605 ASSAY OF LACTIC ACID: CPT

## 2021-11-12 PROCEDURE — 36415 COLL VENOUS BLD VENIPUNCTURE: CPT

## 2021-11-12 RX ORDER — ROPINIROLE 0.25 MG/1
0.25 TABLET, FILM COATED ORAL 3 TIMES DAILY
Status: DISCONTINUED | OUTPATIENT
Start: 2021-11-13 | End: 2021-11-12

## 2021-11-12 RX ORDER — 0.9 % SODIUM CHLORIDE 0.9 %
1000 INTRAVENOUS SOLUTION INTRAVENOUS ONCE
Status: COMPLETED | OUTPATIENT
Start: 2021-11-12 | End: 2021-11-12

## 2021-11-12 RX ORDER — LANOLIN ALCOHOL/MO/W.PET/CERES
1000 CREAM (GRAM) TOPICAL 2 TIMES DAILY
COMMUNITY
End: 2022-09-19

## 2021-11-12 RX ORDER — ROPINIROLE 1 MG/1
1 TABLET, FILM COATED ORAL 3 TIMES DAILY
Status: DISCONTINUED | OUTPATIENT
Start: 2021-11-13 | End: 2021-11-14 | Stop reason: HOSPADM

## 2021-11-12 RX ORDER — DIVALPROEX SODIUM 500 MG/1
500 TABLET, DELAYED RELEASE ORAL NIGHTLY
COMMUNITY
End: 2021-11-12

## 2021-11-12 RX ADMIN — SODIUM CHLORIDE 1000 ML: 9 INJECTION, SOLUTION INTRAVENOUS at 20:06

## 2021-11-12 ASSESSMENT — PAIN DESCRIPTION - LOCATION
LOCATION_2: NECK
LOCATION: FOOT

## 2021-11-12 ASSESSMENT — PAIN DESCRIPTION - DESCRIPTORS: DESCRIPTORS_2: ACHING

## 2021-11-12 ASSESSMENT — PAIN DESCRIPTION - INTENSITY: RATING_2: 8

## 2021-11-12 ASSESSMENT — PAIN DESCRIPTION - FREQUENCY: FREQUENCY: CONTINUOUS

## 2021-11-12 ASSESSMENT — PAIN DESCRIPTION - ORIENTATION: ORIENTATION: RIGHT;LEFT

## 2021-11-12 ASSESSMENT — PAIN SCALES - GENERAL: PAINLEVEL_OUTOF10: 8

## 2021-11-12 NOTE — PROGRESS NOTES
[address] f: [phone] f: [phone] p: [phone] [email or web address]      Ordering Center:   [facility] [address] [facility] [address]  [phone]  WOUND CARE Dept: [phone]   [facility] NUMBER [phone]    Patient Information:      Divina Ordaz  [address]  [place] [address]   [phone]   : 1970  AGE: 46 y.o. GENDER: male   EPISODE DATE: 11/10/2021    Insurance:      PRIMARY INSURANCE:  Plan: MEDICARE PART A AND B  Coverage: MEDICARE  Effective Date: 2015  Group Number: [unfilled]  Subscriber Number: [id number] - (Medicare)    Payor/Plan Subscr  Sex Relation Sub. Ins. ID Effective Group Num   1. MEDICARE - [address] [facility] 83 M 1970 Male Self [id number] 1/1/15                                    [address]        Patient Wound Information:      Problem List Items Addressed This Visit     MRSA infection within last 3 months    * (Principal) Nonhealing nonsurgical wound      Other Visit Diagnoses     MRSA (methicillin resistant Staphylococcus aureus) colonization    -  Primary    Relevant Medications    mupirocin (BACTROBAN) 2 % cream          WOUNDS REQUIRING DRESSING SUPPLIES:     Wound 21 Heel Left (Active)   Number of days: 108       Wound 21 Leg Right; Lower; Lateral (Active)   Number of days: 108       Wound 11/10/21 Leg Right; Lateral (Active)   Wound Image   11/10/21 1507   Wound Cleansed Cleansed with saline 11/10/21 1507   Dressing/Treatment Alginate with Ag; Silicone border  1507   Wound Length (cm) 1.4 cm 11/10/21 1507   Wound Width (cm) 1.3 cm 11/10/21 1507   Wound Depth (cm) 0 cm 11/10/21 1507   Wound Surface Area (cm^2) 1.82 cm^2 11/10/21 1507   Wound Volume (cm^3) 0 cm^3 11/10/21 1507   Post-Procedure Length (cm) 1.4 cm 11/10/21 150   Post-Procedure Width (cm) 1.3 cm 11/10/21 1507   Post-Procedure Depth (cm) 0.2 cm 11/10/21 1507   Post-Procedure Surface Area (cm^2) 1.82 cm^2 11/10/21 1507   Post-Procedure Volume (cm^3) 0.364 cm^3 11/10/21 1507   Wound Assessment Eschar dry 11/10/21 1507   Drainage Amount Moderate 11/10/21 1507   Drainage Description Serosanguinous 11/10/21 1507   Odor None 11/10/21 1507   Anabella-wound Assessment Induration; Blanchable erythema 11/10/21 1507   Margins Attached edges 11/10/21 1507   Number of days: 2       Wound 11/10/21 Groin Right (Active)   Wound Image   11/10/21 1512   Wound Cleansed Cleansed with saline 11/10/21 1512   Offloading for Diabetic Foot Ulcers No offloading required 11/10/21 1512   Wound Length (cm) 0.3 cm 11/10/21 1512   Wound Width (cm) 0.7 cm 11/10/21 1512   Wound Depth (cm) 0 cm 11/10/21 1512   Wound Surface Area (cm^2) 0.21 cm^2 11/10/21 1512   Wound Volume (cm^3) 0 cm^3 11/10/21 1512   Wound Assessment Eschar dry 11/10/21 1512   Drainage Amount None 11/10/21 1512   Odor None 11/10/21 1512   Anabella-wound Assessment Blanchable erythema; Induration 11/10/21 1512   Number of days: 2     Incision 01/15/21 Throat (Active)   Number of days: 301       Incision 01/15/21 (Active)   Number of days: 301       Supplies Requested :      WOUND #: 1   PRIMARY DRESSING:  Alginate with silver pad   Cover and Secure with:  Other 4x4 silicone bordered foam     FREQUENCY OF DRESSING CHANGES:  Three times per week       ADDITIONAL ITEMS:  [] Gloves Small  [x] Gloves Medium [] Gloves Large [] Gloves XLarge  [] Tape 1\" [] Tape 2\" [] Tape 3\"  [] Medipore Tape  [x] Saline  [x] Skin Prep   [] Adhesive Remover   [] Cotton Tip Applicators   [] Other:    Patient Wound(s) Debrided: [x] Yes if yes please add date 11/10/2021   [] No    Debribement Type: Non-excisional/Selective    Patient currently being seen by Home Health: [] Yes   [x] No    Duration for needed supplies:  []15  []30  []60  [x]90 Days    Electronically signed by Wayne Downey RN on 11/10/2021 at 4:17 PM     Provider Information: PROVIDER'S NAME: Jade Grullon CNP     NPI: [id number]

## 2021-11-13 ENCOUNTER — APPOINTMENT (OUTPATIENT)
Dept: INTERVENTIONAL RADIOLOGY/VASCULAR | Age: 51
DRG: 101 | End: 2021-11-13
Payer: MEDICARE

## 2021-11-13 LAB
AVERAGE GLUCOSE: 162 MG/DL (ref 70–126)
GLUCOSE BLD-MCNC: 174 MG/DL (ref 70–108)
GLUCOSE BLD-MCNC: 180 MG/DL (ref 70–108)
GLUCOSE BLD-MCNC: 271 MG/DL (ref 70–108)
GLUCOSE BLD-MCNC: 286 MG/DL (ref 70–108)
HBA1C MFR BLD: 7.4 % (ref 4.4–6.4)
MAGNESIUM: 1.7 MG/DL (ref 1.6–2.4)
TROPONIN T: < 0.01 NG/ML
TROPONIN T: < 0.01 NG/ML

## 2021-11-13 PROCEDURE — 83735 ASSAY OF MAGNESIUM: CPT

## 2021-11-13 PROCEDURE — 93005 ELECTROCARDIOGRAM TRACING: CPT | Performed by: PEDIATRICS

## 2021-11-13 PROCEDURE — 36415 COLL VENOUS BLD VENIPUNCTURE: CPT

## 2021-11-13 PROCEDURE — 99223 1ST HOSP IP/OBS HIGH 75: CPT | Performed by: SOCIAL WORKER

## 2021-11-13 PROCEDURE — 6370000000 HC RX 637 (ALT 250 FOR IP): Performed by: PEDIATRICS

## 2021-11-13 PROCEDURE — 1200000003 HC TELEMETRY R&B

## 2021-11-13 PROCEDURE — 84484 ASSAY OF TROPONIN QUANT: CPT

## 2021-11-13 PROCEDURE — 2580000003 HC RX 258: Performed by: PEDIATRICS

## 2021-11-13 PROCEDURE — 82948 REAGENT STRIP/BLOOD GLUCOSE: CPT

## 2021-11-13 PROCEDURE — 6370000000 HC RX 637 (ALT 250 FOR IP): Performed by: INTERNAL MEDICINE

## 2021-11-13 PROCEDURE — 99232 SBSQ HOSP IP/OBS MODERATE 35: CPT | Performed by: INTERNAL MEDICINE

## 2021-11-13 PROCEDURE — 97110 THERAPEUTIC EXERCISES: CPT

## 2021-11-13 PROCEDURE — 97166 OT EVAL MOD COMPLEX 45 MIN: CPT

## 2021-11-13 PROCEDURE — 83036 HEMOGLOBIN GLYCOSYLATED A1C: CPT

## 2021-11-13 PROCEDURE — 93970 EXTREMITY STUDY: CPT

## 2021-11-13 RX ORDER — ONDANSETRON 4 MG/1
4 TABLET, ORALLY DISINTEGRATING ORAL EVERY 8 HOURS PRN
Status: DISCONTINUED | OUTPATIENT
Start: 2021-11-13 | End: 2021-11-14 | Stop reason: HOSPADM

## 2021-11-13 RX ORDER — INSULIN GLARGINE 100 [IU]/ML
50 INJECTION, SOLUTION SUBCUTANEOUS 2 TIMES DAILY
Status: DISCONTINUED | OUTPATIENT
Start: 2021-11-13 | End: 2021-11-14 | Stop reason: HOSPADM

## 2021-11-13 RX ORDER — SODIUM CHLORIDE 9 MG/ML
25 INJECTION, SOLUTION INTRAVENOUS PRN
Status: DISCONTINUED | OUTPATIENT
Start: 2021-11-13 | End: 2021-11-14 | Stop reason: HOSPADM

## 2021-11-13 RX ORDER — ZONISAMIDE 100 MG/1
600 CAPSULE ORAL NIGHTLY
Status: DISCONTINUED | OUTPATIENT
Start: 2021-11-13 | End: 2021-11-14 | Stop reason: HOSPADM

## 2021-11-13 RX ORDER — SODIUM CHLORIDE 0.9 % (FLUSH) 0.9 %
10 SYRINGE (ML) INJECTION EVERY 12 HOURS SCHEDULED
Status: DISCONTINUED | OUTPATIENT
Start: 2021-11-13 | End: 2021-11-14 | Stop reason: HOSPADM

## 2021-11-13 RX ORDER — DEXTROSE MONOHYDRATE 25 G/50ML
12.5 INJECTION, SOLUTION INTRAVENOUS PRN
Status: DISCONTINUED | OUTPATIENT
Start: 2021-11-13 | End: 2021-11-14 | Stop reason: HOSPADM

## 2021-11-13 RX ORDER — ONDANSETRON 2 MG/ML
4 INJECTION INTRAMUSCULAR; INTRAVENOUS EVERY 6 HOURS PRN
Status: DISCONTINUED | OUTPATIENT
Start: 2021-11-13 | End: 2021-11-14 | Stop reason: HOSPADM

## 2021-11-13 RX ORDER — ERGOCALCIFEROL 1.25 MG/1
50000 CAPSULE ORAL WEEKLY
Status: DISCONTINUED | OUTPATIENT
Start: 2021-11-13 | End: 2021-11-14 | Stop reason: HOSPADM

## 2021-11-13 RX ORDER — POTASSIUM CHLORIDE 7.45 MG/ML
10 INJECTION INTRAVENOUS PRN
Status: DISCONTINUED | OUTPATIENT
Start: 2021-11-13 | End: 2021-11-14 | Stop reason: HOSPADM

## 2021-11-13 RX ORDER — CLINDAMYCIN HYDROCHLORIDE 150 MG/1
450 CAPSULE ORAL EVERY 8 HOURS
Status: DISCONTINUED | OUTPATIENT
Start: 2021-11-13 | End: 2021-11-14 | Stop reason: HOSPADM

## 2021-11-13 RX ORDER — INSULIN GLARGINE 100 [IU]/ML
15 INJECTION, SOLUTION SUBCUTANEOUS 2 TIMES DAILY
Status: DISCONTINUED | OUTPATIENT
Start: 2021-11-13 | End: 2021-11-13

## 2021-11-13 RX ORDER — DEXTROSE MONOHYDRATE 50 MG/ML
100 INJECTION, SOLUTION INTRAVENOUS PRN
Status: DISCONTINUED | OUTPATIENT
Start: 2021-11-13 | End: 2021-11-14 | Stop reason: HOSPADM

## 2021-11-13 RX ORDER — MAGNESIUM SULFATE IN WATER 40 MG/ML
2000 INJECTION, SOLUTION INTRAVENOUS PRN
Status: DISCONTINUED | OUTPATIENT
Start: 2021-11-13 | End: 2021-11-14 | Stop reason: HOSPADM

## 2021-11-13 RX ORDER — POTASSIUM CHLORIDE 20 MEQ/1
40 TABLET, EXTENDED RELEASE ORAL PRN
Status: DISCONTINUED | OUTPATIENT
Start: 2021-11-13 | End: 2021-11-14 | Stop reason: HOSPADM

## 2021-11-13 RX ORDER — LEVETIRACETAM 500 MG/1
2000 TABLET ORAL 2 TIMES DAILY
Status: DISCONTINUED | OUTPATIENT
Start: 2021-11-13 | End: 2021-11-14 | Stop reason: HOSPADM

## 2021-11-13 RX ORDER — LANOLIN ALCOHOL/MO/W.PET/CERES
1000 CREAM (GRAM) TOPICAL 2 TIMES DAILY
Status: DISCONTINUED | OUTPATIENT
Start: 2021-11-13 | End: 2021-11-14 | Stop reason: HOSPADM

## 2021-11-13 RX ORDER — POLYETHYLENE GLYCOL 3350 17 G/17G
17 POWDER, FOR SOLUTION ORAL DAILY PRN
Status: DISCONTINUED | OUTPATIENT
Start: 2021-11-13 | End: 2021-11-14 | Stop reason: HOSPADM

## 2021-11-13 RX ORDER — PANTOPRAZOLE SODIUM 40 MG/1
40 TABLET, DELAYED RELEASE ORAL 2 TIMES DAILY
Status: DISCONTINUED | OUTPATIENT
Start: 2021-11-13 | End: 2021-11-14 | Stop reason: HOSPADM

## 2021-11-13 RX ORDER — MAGNESIUM HYDROXIDE/ALUMINUM HYDROXICE/SIMETHICONE 120; 1200; 1200 MG/30ML; MG/30ML; MG/30ML
30 SUSPENSION ORAL EVERY 6 HOURS PRN
Status: DISCONTINUED | OUTPATIENT
Start: 2021-11-13 | End: 2021-11-14 | Stop reason: HOSPADM

## 2021-11-13 RX ORDER — CEPHALEXIN 500 MG/1
500 CAPSULE ORAL 3 TIMES DAILY
Status: DISCONTINUED | OUTPATIENT
Start: 2021-11-13 | End: 2021-11-14 | Stop reason: HOSPADM

## 2021-11-13 RX ORDER — ACETAMINOPHEN 650 MG/1
650 SUPPOSITORY RECTAL EVERY 6 HOURS PRN
Status: DISCONTINUED | OUTPATIENT
Start: 2021-11-13 | End: 2021-11-14 | Stop reason: HOSPADM

## 2021-11-13 RX ORDER — ACETAMINOPHEN 325 MG/1
650 TABLET ORAL EVERY 6 HOURS PRN
Status: DISCONTINUED | OUTPATIENT
Start: 2021-11-13 | End: 2021-11-14 | Stop reason: HOSPADM

## 2021-11-13 RX ORDER — NICOTINE POLACRILEX 4 MG
15 LOZENGE BUCCAL PRN
Status: DISCONTINUED | OUTPATIENT
Start: 2021-11-13 | End: 2021-11-14 | Stop reason: HOSPADM

## 2021-11-13 RX ORDER — DIVALPROEX SODIUM 250 MG/1
250 TABLET, DELAYED RELEASE ORAL 2 TIMES DAILY
Status: DISCONTINUED | OUTPATIENT
Start: 2021-11-13 | End: 2021-11-14 | Stop reason: HOSPADM

## 2021-11-13 RX ORDER — SODIUM CHLORIDE 0.9 % (FLUSH) 0.9 %
10 SYRINGE (ML) INJECTION PRN
Status: DISCONTINUED | OUTPATIENT
Start: 2021-11-13 | End: 2021-11-14 | Stop reason: HOSPADM

## 2021-11-13 RX ORDER — GABAPENTIN 600 MG/1
600 TABLET ORAL 3 TIMES DAILY
Status: DISCONTINUED | OUTPATIENT
Start: 2021-11-13 | End: 2021-11-14 | Stop reason: HOSPADM

## 2021-11-13 RX ORDER — LACOSAMIDE 50 MG/1
200 TABLET ORAL 2 TIMES DAILY
Status: DISCONTINUED | OUTPATIENT
Start: 2021-11-13 | End: 2021-11-14 | Stop reason: HOSPADM

## 2021-11-13 RX ADMIN — INSULIN LISPRO 6 UNITS: 100 INJECTION, SOLUTION INTRAVENOUS; SUBCUTANEOUS at 10:06

## 2021-11-13 RX ADMIN — CLINDAMYCIN HYDROCHLORIDE 450 MG: 150 CAPSULE ORAL at 16:59

## 2021-11-13 RX ADMIN — PANTOPRAZOLE SODIUM 40 MG: 40 TABLET, DELAYED RELEASE ORAL at 21:25

## 2021-11-13 RX ADMIN — SODIUM CHLORIDE, PRESERVATIVE FREE 10 ML: 5 INJECTION INTRAVENOUS at 10:07

## 2021-11-13 RX ADMIN — LEVETIRACETAM 2000 MG: 500 TABLET, FILM COATED ORAL at 21:22

## 2021-11-13 RX ADMIN — CEPHALEXIN 500 MG: 500 CAPSULE ORAL at 16:58

## 2021-11-13 RX ADMIN — SODIUM CHLORIDE, PRESERVATIVE FREE 10 ML: 5 INJECTION INTRAVENOUS at 21:23

## 2021-11-13 RX ADMIN — PANTOPRAZOLE SODIUM 40 MG: 40 TABLET, DELAYED RELEASE ORAL at 01:58

## 2021-11-13 RX ADMIN — INSULIN GLARGINE 50 UNITS: 100 INJECTION, SOLUTION SUBCUTANEOUS at 21:27

## 2021-11-13 RX ADMIN — GABAPENTIN 600 MG: 600 TABLET, FILM COATED ORAL at 16:58

## 2021-11-13 RX ADMIN — Medication 1000 MCG: at 10:09

## 2021-11-13 RX ADMIN — INSULIN LISPRO 6 UNITS: 100 INJECTION, SOLUTION INTRAVENOUS; SUBCUTANEOUS at 13:38

## 2021-11-13 RX ADMIN — INSULIN LISPRO 2 UNITS: 100 INJECTION, SOLUTION INTRAVENOUS; SUBCUTANEOUS at 16:59

## 2021-11-13 RX ADMIN — METOPROLOL TARTRATE 25 MG: 25 TABLET, FILM COATED ORAL at 21:23

## 2021-11-13 RX ADMIN — GABAPENTIN 600 MG: 600 TABLET, FILM COATED ORAL at 21:22

## 2021-11-13 RX ADMIN — INSULIN GLARGINE 15 UNITS: 100 INJECTION, SOLUTION SUBCUTANEOUS at 13:37

## 2021-11-13 RX ADMIN — DIVALPROEX SODIUM 250 MG: 250 TABLET, DELAYED RELEASE ORAL at 10:08

## 2021-11-13 RX ADMIN — LACOSAMIDE 200 MG: 50 TABLET, FILM COATED ORAL at 10:05

## 2021-11-13 RX ADMIN — LEVETIRACETAM 2000 MG: 500 TABLET, FILM COATED ORAL at 10:09

## 2021-11-13 RX ADMIN — Medication 1000 MCG: at 21:22

## 2021-11-13 RX ADMIN — CLINDAMYCIN HYDROCHLORIDE 450 MG: 150 CAPSULE ORAL at 10:06

## 2021-11-13 RX ADMIN — LACOSAMIDE 200 MG: 50 TABLET, FILM COATED ORAL at 21:25

## 2021-11-13 RX ADMIN — ROPINIROLE HYDROCHLORIDE 1 MG: 1 TABLET, FILM COATED ORAL at 13:37

## 2021-11-13 RX ADMIN — CLINDAMYCIN HYDROCHLORIDE 450 MG: 150 CAPSULE ORAL at 01:59

## 2021-11-13 RX ADMIN — ROPINIROLE HYDROCHLORIDE 1 MG: 1 TABLET, FILM COATED ORAL at 21:27

## 2021-11-13 RX ADMIN — ZONISAMIDE 600 MG: 100 CAPSULE ORAL at 01:59

## 2021-11-13 RX ADMIN — LACOSAMIDE 200 MG: 50 TABLET, FILM COATED ORAL at 01:58

## 2021-11-13 RX ADMIN — DIVALPROEX SODIUM 250 MG: 250 TABLET, DELAYED RELEASE ORAL at 02:00

## 2021-11-13 RX ADMIN — PANTOPRAZOLE SODIUM 40 MG: 40 TABLET, DELAYED RELEASE ORAL at 10:05

## 2021-11-13 RX ADMIN — METOPROLOL TARTRATE 25 MG: 25 TABLET, FILM COATED ORAL at 02:00

## 2021-11-13 RX ADMIN — ZONISAMIDE 600 MG: 100 CAPSULE ORAL at 21:22

## 2021-11-13 RX ADMIN — DIVALPROEX SODIUM 250 MG: 250 TABLET, DELAYED RELEASE ORAL at 21:23

## 2021-11-13 RX ADMIN — LEVETIRACETAM 2000 MG: 500 TABLET, FILM COATED ORAL at 01:59

## 2021-11-13 RX ADMIN — CEPHALEXIN 500 MG: 500 CAPSULE ORAL at 21:22

## 2021-11-13 RX ADMIN — ERGOCALCIFEROL 50000 UNITS: 1.25 CAPSULE ORAL at 10:09

## 2021-11-13 ASSESSMENT — PAIN SCALES - GENERAL
PAINLEVEL_OUTOF10: 7
PAINLEVEL_OUTOF10: 0

## 2021-11-13 ASSESSMENT — PAIN DESCRIPTION - ONSET: ONSET: ON-GOING

## 2021-11-13 ASSESSMENT — PAIN DESCRIPTION - LOCATION: LOCATION: HEAD

## 2021-11-13 ASSESSMENT — PAIN DESCRIPTION - PAIN TYPE: TYPE: CHRONIC PAIN

## 2021-11-13 ASSESSMENT — ENCOUNTER SYMPTOMS
PHOTOPHOBIA: 0
TROUBLE SWALLOWING: 0
COUGH: 0
VOMITING: 0
SHORTNESS OF BREATH: 0
DIARRHEA: 0
COLOR CHANGE: 0
NAUSEA: 0

## 2021-11-13 ASSESSMENT — PAIN DESCRIPTION - PROGRESSION: CLINICAL_PROGRESSION: NOT CHANGED

## 2021-11-13 ASSESSMENT — PAIN DESCRIPTION - ORIENTATION: ORIENTATION: RIGHT;LEFT

## 2021-11-13 ASSESSMENT — PAIN DESCRIPTION - FREQUENCY: FREQUENCY: CONTINUOUS

## 2021-11-13 ASSESSMENT — PAIN DESCRIPTION - DESCRIPTORS: DESCRIPTORS: HEADACHE

## 2021-11-13 ASSESSMENT — PAIN - FUNCTIONAL ASSESSMENT: PAIN_FUNCTIONAL_ASSESSMENT: ACTIVITIES ARE NOT PREVENTED

## 2021-11-13 NOTE — ED NOTES
ED to inpatient nurses report    Chief Complaint   Patient presents with    Fall    Seizures      Present to ED from home  LOC: alert and orientated to name, place, date  Vital signs   Vitals:    11/12/21 2323 11/12/21 2336 11/12/21 2353 11/13/21 0008   BP: 109/70 125/76 (!) 136/91 125/88   Pulse: 80 83 81 82   Resp: 15 18 16 18   Temp:       TempSrc:       SpO2: 94% 92% 95% 95%   Weight:       Height:          Oxygen Baseline: No oxygen given at baseline    Current needs required: None    LDAs:   Peripheral IV 11/12/21 Right Antecubital (Active)   Site Assessment Clean; Dry; Intact 11/12/21 2248   Line Status Flushed; Infusing 11/12/21 2248   Dressing Status Clean; Dry; Intact 11/12/21 2248   Dressing Intervention New 11/12/21 2021     Mobility: Requires assistance * 1  Pending ED orders: No pending orders.  Patient awaiting his Requip  Present condition: Stable, ready for transfer    Electronically signed by Erica Day RN on 11/13/2021 at 12:27 AM       Erica Day RN  11/13/21 0028

## 2021-11-13 NOTE — PROGRESS NOTES
Hospitalist Progress Note          Patient: Zay Velarde  : 1970  MRN: [id number]     Acct: [de-identified]    PCP: Tiffany Driscoll MD  Date of Admission: 2021  Date of Service: 2021      Hospital Problems           Last Modified POA    Breakthrough seizure ([address] 2021 Yes          Assessment and Plan:    1. Breakthrough seizures:  Patient with known seizure disorder that is difficult to control on 5 antiepileptic medications (keppra, zonigran, depakote, vimpat and ativan). Neurology consultation pending  Follows with  neurology and is due for a weeklong EEG in [place] within 1 to 2 weeks. Fall/seizure/aspiration precautions. Neurochecks every 4 hours. As needed IV Ativan for breakthrough seizures. 2.  Type 2 diabetes mellitus:  Check hemoglobin A1c in a.m. 7.3  Patient has been hyperglycemic at home. Will resume lantus at home dose. Medium dose sliding scale insulin coverage. Add meal time insulin per home regimen. Accu-Cheks q. before meals and at bedtime. Diabetic diet. 3.  Diabetic foot ulcer:  History of multiple diabetic foot ulcers and infections. Currently with right lateral surface lower extremity chronic diabetic ulcer with MRSA infection. On Keflex by wound clinic on 11/10/2021. Wound cultures positive for MRSA 11/10/2021. Change to clindamycin 450 mg 3 times daily x10 days. Continue Clindamycin/Keflex. Continue wound care and dressing changes per instructions by wound care clinic. Patient has been having recurrent RLE abscess'. Etiology unclear besides his DM increasing his risk. 4.  Psychiatric disorders:  Reports visual hallucinations with known underlying history of depression and anxiety. Holding Cymbalta for now given uncontrolled seizures and altered mentation. 5.  Diabetic neuropathy:  Resume neurontin    6. Restless leg syndrome:  Resume home dose Requip. 7. GERD:  Home dose PPI. 8.  DVT prophylaxis:  Subcu Lovenox.     9. Hypertension:  Not optimally controlled. Resume home dose metoprolol for now. Consider the addition of ACE inhibitor/ARB prior to discharge or as an outpatient. 10.  Multiple falls at home:  Likely due to breakthrough seizures. Rule out acute coronary syndrome or cardiac arrhythmias as a possible cause. Serial troponins, telemetry, and EKG. Physical deconditioning and polypharmacy likely contributing. Urinalysis checked on medical floor and negative. PT/OT evaluation and treatment and to assess safety prior to discharge versus placement for rehab. 11.  Fluids/electrolytes/nutrition:  Saline lock IV fluids. Replenish hypomagnesemia. Repeat electrolytes in a.m. Diabetic diet. DISPO: Will collaborate with neurology. Will improve blood sugar control and continue antibiotics. If seizure free and blood sugars controlled and no further rec's from neuro, can go home.       =======================================================================      Chief Complaint: Falls, confusion/altered mental status, suspected seizures, uncontrolled blood sugars. History Of Present Illness:  Florian Bha is a 46 y.o. male with PMHx of difficult to control seizure disorder on multiple antielliptic medications, bipolar disorder, chronic lower back pain, depression, diabetes mellitus, diabetic foot ulcer with MRSA infection and GERD who presented to the emergency room here at [address] accompanied by his family with complaints of uncontrolled blood sugars in the 400s+ multiple seizures and falls at home that has progressively gotten worse over the last 2 weeks. Patient also has been reported to see visual hallucinations and to act a little off with increased confusion at times according to the family. Patient apparently follows with neurology at [facility] and is supposed to have a weeklong EEG done the end of this month.   He has been taking all of his seizure medications as prescribed which includes about 5 seizure medications. According to the patient and family he has had at least 5 falls in the last 48 hours. Patient himself states that his right foot kind of gives away at times and feels weaker. He does also have a resting tremor worsening his balance. Family believes that patient has been having seizures with his eyes rolling to the back and postictal state. Again compliant with medications as he states and his wife does help him with taking his medications. No fevers or chills, no nausea vomiting or diarrhea, no abdominal pain, no chest pain, no UTI or URI symptoms. In the emergency room the patient was afebrile with tachycardia at 95 and tachypnea 18, blood pressure 145/94 with pulse oximetry of 96% on room air. Including CBC and BMP within acceptable range. Blood sugar 196. Beta hydroxybutyrate slightly elevated at 0.84. COVID-19 PCR was negative. Urinalysis was negative. CT of the head with no acute intracranial process. Chest x-ray also no acute cardiopulmonary process. EKG normal sinus rhythm with no ischemic changes. Patient was given 1 L of normal saline fluid bolus. Patient was then admitted to our hospitalist service for further evaluation and treatment. At the time of evaluation on the medical floor patient was laying in bed comfortably sleeping but easily awakened. No acute distress. Cooperative and able to answer all questions appropriately despite being very slow to answer. Alert and oriented x3. Subjective: Patient feels groggy today. Wife at bedside. They state he has been having RLE abscess reoccuring since this summer. Blood sugars have also been poorly controlled at home. Otherwise he denies chest pain, sob, NV, headache. No seizures today. No fevers or chills.        Past Medical History:        Diagnosis Date    Abnormal thyroid biopsy     s/p left hemithyroidectomy 4/6926 - follicular adenoma    Acid reflux     Anemia     resolved - previously seen by  Gerad (due to enlarged spleen)    Anesthesia     seizures with brain surgery due to blood sugar got really high    Bipolar disorder ([address]     Blood clot in vein 04/07/2016    Mernabrenda Garcia     Cancer [facility]) 04/2019    thyroid    Chest pain     previously seeing [address] [place] [address] cardiologist, now seeing Dr. Antoine Burger (LAD bridging on cath 4/2016)    Chronic back pain     Chronic bronchitis ([address]     Dr. Jo Odonnell Colon polyp 08/30/2016    Depression     seeing Corbin Mcguire DVT (deep venous thrombosis) ([address] [phone]    not on Coumadin due to unable to regulate - Dr. Sj Young - no etiology found per patient    Esophageal abnormality     nodule     Fatty liver disease, nonalcoholic     U/S 17/8[facility]     legs    History of Doppler ultrasound 05/29/2011    No hemodynamically significant carotid stenosis is identified. A thyroid nodule on each side. Dedicated ultrasound of thyroid gland is suggested to further evaluate if clinically indicated.      History of pulmonary embolism 2007    s/p GFF, related to knee surgery    Hx of blood clots     leg and lung    Hyperlipidemia     severely elevated triglycerides    Hypertension     diastolic    Intracranial arachnoid cyst 11/2012    Dr. Tyson Oleary drained, complicated with seizures, DKA    Irritable bowel syndrome     Liver disease     [facility] [place] - elevated LFT - positive smooth muscle antibody, steatosis per liver bx 3/2014 with Dr. Adrianne Parada (multiple drug resistant organisms) resistance 2012    MRSA (methicillin resistant staph aureus) culture positive     h/o in foot and before brain surgery    Nephrolithiasis     noted on CT abdomen 9/2016, 6/2019    Neuropathy     Pancreatic insufficiency     Positive SANDY (antinuclear antibody)     Dr. Amina Owens - first visit in 6/2013    Prolonged emergence from general anesthesia     Restless legs syndrome     Seizures ([address]     started with brain surgery    Sinus tachycardia     Holter 3/2013 - seeing  Baki    Skull fracture [facility] [facility]     clips     Sleep apnea     positive sleep apnea per study 10/2020.  Systolic dysfunction     \"systolic bridge\"  EF 72% ECHO 9/2019    Type II or unspecified type diabetes mellitus without mention of complication, not stated as uncontrolled 2007       Past Surgical History:        Procedure Laterality Date    CARDIAC CATHETERIZATION      2011,5-6 years ago   [address]  3-2012   [address]  04/28/2016    [address]     CARPAL TUNNEL RELEASE  01/2017    CHOLECYSTECTOMY  2004    COLONOSCOPY  2012    COLONOSCOPY  08/2016    2 tubular adenomas - reportedly needs repeat scope in 6 months    CRANIOTOMY  11/2012    arachnoid cyst drainage    EKG 12-LEAD  10/18/2015         ENDOSCOPY, COLON, DIAGNOSTIC      HERNIA REPAIR Right 1996    [facility]--Dr. Nell Xavier INGUINAL HERNIA REPAIR Right 09/15/2016    Robotic assisted    KNEE ARTHROSCOPY Right 2016    KNEE SURGERY Left 2007    acl and debrided twice    OTHER SURGICAL HISTORY  5-31-11    Tilt table was associated w/ nonspecific symptoms or nausea, otherwise no significant dizziness or syncope. No significant hemodynamic changes. Otherwise, unremarkable tilt table test after 30 minutes of tilting.  OTHER SURGICAL HISTORY  01/20/2017    RIGHT SHOULDER ARTHROSCOPY, OPEN STAN, OPEN ACROMIOPLASTY, BICEP TENDESIS, RIGHT CARPAL TUNNEL RELEASE    SHOULDER SURGERY Right 01/2007    THYROID LOBECTOMY N/A 1/15/2021    THYROID LOBECTOMY, UVULOPALATOPHARYNGOPLAST LAOP performed by Job Phillips MD at [facility] ECHOCARDIOGRAM  3-05-11    LV size and systolic function normal. EF 55-65%.      UPPER GASTROINTESTINAL ENDOSCOPY  2012    UPPER GASTROINTESTINAL ENDOSCOPY  2016    Dr. Chiara Elmore Left 10/22/2019    EGD DILATION SAVORY performed by Katiuska Gross MD at [facility] ENDOSCOPY Left 10/22/2019    EGD BIOPSY performed by Allison Saldana MD at [address]  2007       Medications Prior to Admission:   Prior to Admission medications    Medication Sig Start Date End Date Taking? Authorizing Provider   vitamin B-12 (CYANOCOBALAMIN) 1000 MCG tablet Take 1,000 mcg by mouth 2 times daily    Yes Historical Provider, MD   promethazine (PHENERGAN) 12.5 MG tablet Take 1-2 tablets by mouth every 8 hours as needed for Nausea 11/11/21  Yes Candi Cota MD   divalproex (DEPAKOTE) 250 MG DR tablet Take 250 mg by mouth 2 times daily    Yes Historical Provider, MD   insulin glargine (BASAGLAR KWIKPEN) 100 UNIT/ML injection pen Inject 50 Units into the skin 2 times daily   Yes Historical Provider, MD   DULoxetine (CYMBALTA) 60 MG extended release capsule Take 120 mg by mouth daily   Yes Historical Provider, MD   cephALEXin (KEFLEX) 250 MG capsule Take 2 capsules by mouth 3 times daily 10/27/21  Yes Collin Garcia MD   metoprolol tartrate (LOPRESSOR) 25 MG tablet Take 1 tablet by mouth 2 times daily 9/17/21  Yes Candi Cota MD   empagliflozin (JARDIANCE) 25 MG tablet Take 25 mg by mouth daily 9/2/21  Yes Candi Cota MD   gabapentin (NEURONTIN) 600 MG tablet Take 600 mg by mouth 3 times daily.    Yes Historical Provider, MD   LORazepam (ATIVAN) 0.5 MG tablet Lorazepam Active 0.5 MG PO Twice Daily as needed 0 August 18th, 2021 10:32am 8/18/21  Yes Historical Provider, MD   levETIRAcetam (KEPPRA) 1000 MG tablet Take 2 tablets by mouth 2 times daily 8/4/21  Yes Lydia Noonan MD   zonisamide (ZONEGRAN) 100 MG capsule Take 5 capsules by mouth nightly  Patient taking differently: Take 600 mg by mouth nightly  8/4/21  Yes Lydia Noonan MD   lactulose (CHRONULAC) 10 GM/15ML solution Take 15 mLs by mouth 3 times daily  Patient taking differently: Take 45 mLs by mouth 3 times daily  8/4/21  Yes Lydia Noonan MD   insulin lispro (HUMALOG) 100 UNIT/ML injection vial Takes 10 units with meals plus group 2 sliding scale   Yes Historical Provider, MD   pantoprazole (PROTONIX) 40 MG tablet Take 1 tablet by mouth 2 times daily 3/8/21  Yes Wojciech Underwood MD   vitamin D (ERGOCALCIFEROL) 1.25 MG (16712 UT) CAPS capsule Take 1 capsule by mouth once a week  Patient taking differently: Take 50,000 Units by mouth once a week Monday 3/8/21  Yes Wojciech Underwood MD   rOPINIRole (REQUIP) 1 MG tablet TAKE 1 TABLET BY MOUTH THREE TIMES A DAY 12/22/20  Yes Wojciech Underwood MD   lacosamide (VIMPAT) 100 MG TABS tablet Take 200 mg by mouth 2 times daily. Yes Historical Provider, MD   sildenafil (VIAGRA) 100 MG tablet Take 1 tablet by mouth as needed for Erectile Dysfunction 1/29/19  Yes Wojciech Underwood MD   blood glucose test strips (ONETOUCH ULTRA) strip 1 each by In Vitro route 3 times daily DX: E11.65 Dispense Onetouch Ultra 2 test strips 8/23/21   Wojciech Underwood MD       Allergies:  Ciprofloxacin-ciproflox hcl er, Heparin, Metformin, Niacin and related, Tramadol hcl, Ultram [tramadol], and Warfarin    Social History:    The patient currently lives with wife and is normally pretty independent. .   Tobacco use:   reports that he has never smoked. He has never used smokeless tobacco.  Alcohol use:   reports no history of alcohol use. Drug use:  reports no history of drug use. Family History:   as follows:      Problem Relation Age of Onset    Heart Disease Father 61        MI    Stroke Brother     Obesity Brother     Arthritis Mother     Seizures Mother     Obesity Sister     Other Brother         pulmonary embolism    Diabetes Daughter     Seizures Brother        Review of Systems:   Pertinent positives and negatives as noted in the HPI. All other systems reviewed and negative.     Physical Exam:    /79   Pulse 91   Temp 98.1 °F (36.7 °C) (Oral)   Resp 12   Ht 6' 2\" (1.88 m)   Wt 230 lb (104.3 kg)   SpO2 94%   BMI 29.53 kg/m²       General appearance: No apparent distress, well technology. **      Final report electronically signed by Dr. Suman Hartmann on 11/12/2021 8:49 PM      XR CHEST PORTABLE   Final Result   No acute finding            **This report has been created using voice recognition software. It may contain minor errors which are inherent in voice recognition technology. **      Final report electronically signed by Dr. Suman Hartmann on 11/12/2021 8:18 PM               PT/OT Eval Status:  will be assessed  Diet: ADULT DIET; Regular; 3 carb choices (45 gm/meal)  DVT prophylaxis: Subcu Lovenox. Code Status: Full Code      Thank you Radha Fritz MD for the opportunity to be involved in this patient's care.     Electronically signed by Silvestre Peres MD on 11/13/2021 at 2:44 PM.

## 2021-11-13 NOTE — PROGRESS NOTES
Funmilayo Gonsalez 60  INPATIENT OCCUPATIONAL THERAPY  [facility] MED SURG 8B  EVALUATION    Time:    Time In: 0157  Time Out: 1250  Timed Code Treatment Minutes: 12 Minutes  Minutes: 27          Date: 2021  Patient Name: Divina Ordaz,   Gender: male      MRN: [id number]  : 1970  (46 y.o.)  Referring Practitioner: Dr. Glory Campbell  Diagnosis: abrasion left leg w/o infection  Additional Pertinent Hx: 46 y.o. male with PMHx of difficult to control seizure disorder on multiple antielliptic medications, bipolar disorder, chronic lower back pain, depression, diabetes mellitus, diabetic foot ulcer with MRSA infection and GERD who presented to the emergency room here at [address] accompanied by his family with complaints of uncontrolled blood sugars in the 400s+ multiple seizures and falls at home that has progressively gotten worse over the last 2 weeks. Patient also has been reported to see visual hallucinations and to act a little off with increased confusion at times according to the family. Patient apparently follows with neurology at [facility] and is supposed to have a weeklong EEG done the end of this month. He has been taking all of his seizure medications as prescribed which includes about 5 seizure medications. According to the patient and family he has had at least 5 falls in the last 48 hours. Patient himself states that his right foot kind of gives away at times and feels weaker. He does also have a resting tremor worsening his balance.   Family believes that patient has been having seizures with his eyes rolling to the back and postictal state    Restrictions/Precautions:  Restrictions/Precautions: Fall Risk, Seizure  Position Activity Restriction  Other position/activity restrictions: check ortho BP    Subjective  Chart Reviewed: Yes, Orders, Progress Notes, History and Physical  Patient assessed for rehabilitation services?: Yes  Family / Caregiver Present: Yes (wife)    Subjective: cooperative, wife present for session    Pain:  Pain Assessment  Patient Currently in Pain: No (c/o numbness in L side of face)    Vitals: Blood Pressure: after mobility 30ft-115/88; upon returning to room 124/83 O2 above 90% throughout session on room air    Social/Functional History:  Lives With: Family (spouse, 2 daughters, 3 children (3, 2, 2yo))  Type of Home: House  Home Layout: One level  Home Access: Stairs to enter without rails (1)  Home Equipment: Rolling walker, Wheelchair-manual, Cane   Bathroom Shower/Tub: Tub/Shower unit  Bathroom Toilet: Standard  Bathroom Equipment: Grab bars in shower, Tub transfer bench       ADL Assistance: Needs assistance (A for BADLs)  Homemaking Assistance: Needs assistance  Ambulation Assistance: Independent  Transfer Assistance: Independent          Additional Comments: Used RW at home (has tendency to walk without). Hx of recent falls    VISION:Corrected    HEARING:  WFL    COGNITION: Slow Processing, Decreased Problem Solving and Decreased Safety Awareness    RANGE OF MOTION:  Bilateral Upper Extremity:  WFL    STRENGTH:  Pt had difficulty following directions-discrepancies/inconsistencies noted in RUE & LUE (R shoulder 4-/5, L shoulder 4/5-elbow flexion R 4/5, L 3/5)     SENSATION:   WFL    **Wife reports hx of essential tremors-especially on R side    ADL:   Lower Extremity Dressing: with set-up.  donned B shoes including tying. BALANCE:  Standing Balance: Contact Guard Assistance. BED MOBILITY:  Supine to Sit: Stand By Assistance    Sit to Supine: Contact Guard Assistance      TRANSFERS:  Sit to Stand:  Contact Guard Assistance. from EOB & chair with arms  Stand to Sit: Contact Guard Assistance. FUNCTIONAL MOBILITY:  Assistive Device: Rolling Walker  Assist Level:  Contact Guard Assistance. Distance: 30ft then requested seated RB, increased R sided tremor noted.  after RB Pt ambulated back to room with chair close behind-noted scissoring gait when turning-min A back to room. Upon returning to room Pt very pale, SOB-nurse aware-neurology in room at end of session. Pt reporting L side of face tingling/numbness. Activity Tolerance:  Patient tolerance of  treatment: fair. Assessment:  Assessment: Pt demo decreased activity tolerance, decreased balance, cognition, & safety awareness s/p admission with possible seizures per wife. Continued OT recommended to increase indep & safety for returning to ADLs at home. Performance deficits / Impairments: Decreased endurance, Decreased functional mobility , Decreased ADL status, Decreased balance, Decreased safe awareness  Prognosis: Fair  REQUIRES OT FOLLOW UP: Yes  Decision Making: Medium Complexity  Safety Devices in place: Yes  Type of devices: All fall risk precautions in place, Call light within reach, Gait belt    Treatment Initiated: Treatment and education initiated within context of evaluation. Evaluation time included review of current medical information, gathering information related to past medical, social and functional history, completion of standardized testing, formal and informal observation of tasks, assessment of data and development of plan of care and goals. Treatment time included skilled education and facilitation of tasks to increase safety and independence with ADL's for improved functional independence and quality of life. Discharge Recommendations:  Continue to assess pending progress    Patient Education:  OT Education: OT Role, Plan of Care, ADL Adaptive Strategies, Transfer Training, Precautions, Family Education    Equipment Recommendations: Other: monitor pending progress    Plan:  Times per week: 5x  Current Treatment Recommendations: Functional Mobility Training, Balance Training, Endurance Training, Safety Education & Training, Self-Care / ADL, Patient/Caregiver Education & Training. See long-term goal time frame for expected duration of plan of care.   If no long-term goals established, a short length of stay is anticipated. Goals:  Patient goals : go home  Short term goals  Time Frame for Short term goals: until discharge  Short term goal 1: Pt will complete various t/fs including toilet with SBA & 0-2 vcs for safety  Short term goal 2: Pt will tolerate standing 2-3 min with SBA for increased ease of sinkside grooming  Short term goal 3: Pt will complete mobility to/from bathroom + with ADL item retrieval with SBA, RW, & 0-2 vcs for safety  Short term goal 4: Pt will complete BADL routine with min A & 0-2 vcs for safety  Long term goals  Time Frame for Long term goals : No LTG set d/t short ELOS         Following session, patient left in safe position with all fall risk precautions in place.

## 2021-11-13 NOTE — ED NOTES
ED nurse-to-nurse bedside report    Chief Complaint   Patient presents with    Fall    Seizures      LOC: alert, oriented to name and place  Vital signs   Vitals:    11/12/21 1926 11/12/21 2150 11/12/21 2247 11/12/21 2307   BP: (!) 145/94  128/84 115/82   Pulse: 95 89 83 78   Resp: 18 17 18 19   Temp: 98.9 °F (37.2 °C)      TempSrc: Oral      SpO2: 96% 95% 94% 93%   Weight: 230 lb (104.3 kg)      Height: 6' 2\" (1.88 m)         Pain: 0  Oxygen: No  Telemetry: Yes  LDAs:   Peripheral IV 11/12/21 Right Antecubital (Active)   Site Assessment Clean; Dry; Intact 11/12/21 2248   Line Status Flushed; Infusing 11/12/21 2248   Dressing Status Clean; Dry; Intact 11/12/21 2248   Dressing Intervention New 11/12/21 2021     Continuous Infusions:   Mobility: Requires assistance * 1  Viramontes Fall Risk Score: Fall Risk 7/16/2019   2 or more falls in past year? yes   Fall with injury in past year?  yes     Fall Interventions: Bed Alarm, call light within reach       Carlyn Beckford RN  11/12/21 [address]

## 2021-11-13 NOTE — ED NOTES
This RN contacted Sj Young MD as patient is requesting his nightly Requip for his restless legs. Awaiting return contact, awaiting orders.      Krystyna Dawn RN  11/12/21 6885

## 2021-11-13 NOTE — ED PROVIDER NOTES
EMERGENCY MEDICINE DEPARTMENT ENCOUNTER      CHIEF COMPLAINT    Chief Complaint   Patient presents with   Huel Teller Seizures       [facility]    Divina Ordaz is a 46 y.o. male with diabetes, anemia, NAFLD (fatty liver), seizure disorder, who presents with generalized acute confusion, frequent falls since the onset yesterday. Per family, he has endorsed visual hallucinations as well. He is compliant with all of his medications. He has not been himself in terms of mental status. The duration has been constant since the onset. The generalized confusion may be associated with an underlying infection though pt denies any active cough, fevers, though he's had a recent admission at [facility] for cellulitis, and still has a wound on his right lateral lower leg. Pt is not immunized. No aggravating or alleviating factors. He does endorse poor diet and compliance with his diabetes. REVIEW OF SYSTEMS    Cardiac: No chest pain or palpitations  Respiratory: No shortness of breath or new cough  General: No fevers; +generalized malaise, weakness, lethargy  Neuro: +altered mental status  Endocrine: +hyperglycemia  : No dysuria or hematuria  GI: No vomiting or diarrhea  See HPI for further details. All other systems reviewed and are negative.     PAST MEDICAL OR SURGICAL HISTORY    Past Medical History:   Diagnosis Date    Abnormal thyroid biopsy     s/p left hemithyroidectomy 1/3348 - follicular adenoma    Acid reflux     Anemia     resolved - previously seen by Dr. Matty Eli (due to enlarged spleen)    Anesthesia     seizures with brain surgery due to blood sugar got really high    Bipolar disorder ([facility]     Blood clot in vein 04/07/2016    [facility]     Cancer [facility]) 04/2019    thyroid    Chest pain     previously seeing [address] [place] [address] cardiologist, now seeing Dr. Tyrone Mccoy (LAD bridging on cath 4/2016)    Chronic back pain     Chronic bronchitis ([facility]     Dr. Otto Wilkerson Colon polyp 08/30/2016    Depression     seeing Dav Argueta DVT (deep venous thrombosis) ([address] [id number]    not on Coumadin due to unable to regulate - Dr. Elsie Oconnell - no etiology found per patient    Esophageal abnormality     nodule     Fatty liver disease, nonalcoholic     U/S 89/0832 [facility]    Furuncle     legs    History of Doppler ultrasound 05/29/2011    No hemodynamically significant carotid stenosis is identified. A thyroid nodule on each side. Dedicated ultrasound of thyroid gland is suggested to further evaluate if clinically indicated.  History of pulmonary embolism 2007    s/p GFF, related to knee surgery    Hx of blood clots     leg and lung    Hyperlipidemia     severely elevated triglycerides    Hypertension     diastolic    Intracranial arachnoid cyst 11/2012    Dr. Vlad Bobby drained, complicated with seizures, DKA    Irritable bowel syndrome     Liver disease     August Peat - elevated LFT - positive smooth muscle antibody, steatosis per liver bx 3/2014 with Dr. Marisela Granados (multiple drug resistant organisms) resistance 2012    MRSA (methicillin resistant staph aureus) culture positive     h/o in foot and before brain surgery    Nephrolithiasis     noted on CT abdomen 9/2016, 6/2019    Neuropathy     Pancreatic insufficiency     Positive SANDY (antinuclear antibody)     Dr. Jennifer Clarke - first visit in 6/2013    Prolonged emergence from general anesthesia     Restless legs syndrome     Seizures ([address]     started with brain surgery    Sinus tachycardia     Holter 3/2013 - seeing Dr. Dipti Christopher fracture [facility])     clips     Sleep apnea     positive sleep apnea per study 10/2020.     Systolic dysfunction     \"systolic bridge\"  EF 23% ECHO 9/2019    Type II or unspecified type diabetes mellitus without mention of complication, not stated as uncontrolled 2007     Past Surgical History:   Procedure Laterality Date    CARDIAC CATHETERIZATION      2011,5-6 years ago   [address]  3-2012   [address]  04/28/2016    [facility]  CARPAL TUNNEL RELEASE  01/2017    CHOLECYSTECTOMY  2004    COLONOSCOPY  2012    COLONOSCOPY  08/2016    2 tubular adenomas - reportedly needs repeat scope in 6 months    CRANIOTOMY  11/2012    arachnoid cyst drainage    EKG 12-LEAD  10/18/2015         ENDOSCOPY, COLON, DIAGNOSTIC      HERNIA REPAIR Right 1996    [facility]--Dr. Félix Albright INGUINAL HERNIA REPAIR Right 09/15/2016    Robotic assisted    KNEE ARTHROSCOPY Right 2016    KNEE SURGERY Left 2007    acl and debrided twice    OTHER SURGICAL HISTORY  5-31-11    Tilt table was associated w/ nonspecific symptoms or nausea, otherwise no significant dizziness or syncope. No significant hemodynamic changes. Otherwise, unremarkable tilt table test after 30 minutes of tilting.  OTHER SURGICAL HISTORY  01/20/2017    RIGHT SHOULDER ARTHROSCOPY, OPEN STAN, OPEN ACROMIOPLASTY, BICEP TENDESIS, RIGHT CARPAL TUNNEL RELEASE    SHOULDER SURGERY Right 01/2007    THYROID LOBECTOMY N/A 1/15/2021    THYROID LOBECTOMY, UVULOPALATOPHARYNGOPLAST LAOP performed by Tonia Kolb MD at [address] [facility] ECHOCARDIOGRAM  3-05-11    LV size and systolic function normal. EF 55-65%.      UPPER GASTROINTESTINAL ENDOSCOPY  2012    UPPER GASTROINTESTINAL ENDOSCOPY  2016    Dr. Rebolledo Never Left 10/22/2019    EGD DILATION SAVORY performed by Irene Richardson MD at [address] [facility] ENDOSCOPY Left 10/22/2019    EGD BIOPSY performed by Irene Richardson MD at [address]  2007       CURRENT MEDICATIONS        ALLERGIES    Allergies   Allergen Reactions    Ciprofloxacin-Ciproflox Hcl Er Other (See Comments)     Elevated creatinine    Heparin      Monitor for thrombocytopenia    Metformin Nausea Only     Abdominal pain, diarrhea    Niacin And Related Other (See Comments)     Burning sensation, severe flushing    Tramadol Hcl      Contraindication to seizure file    Marital Status: Not on file   Intimate Partner Violence:     Fear of Current or Ex-Partner: Not on file    Emotionally Abused: Not on file    Physically Abused: Not on file    Sexually Abused: Not on file   Housing Stability:     Unable to Pay for Housing in the Last Year: Not on file    Number of [place] in the Last Year: Not on file    Unstable Housing in the Last Year: Not on file       PHYSICAL EXAM    VITAL SIGNS: /78   Pulse 75   Temp 97.8 °F (36.6 °C) (Oral)   Resp 18   Ht 6' 2\" (1.88 m)   Wt 230 lb (104.3 kg)   SpO2 95%   BMI 29.53 kg/m²   Constitutional:  Well developed, well nourished, moderate acute distress  Eyes:  Pupils equally round and reactive to light, sclera nonicteric  HENT:  atraumatic, dry mucous membranes  NECK: Normal range of motion, no JVD  Respiratory:  mild tachypnea, respiratory distress, normal breath sounds, no wheezing   Cardiovascular:  normal rate, normal rhythm, no murmurs   GI:  Soft, nondistended, normal bowel sounds, nontender  Musculoskeletal:  No edema, no acute deformities   Integument:  Skin is warm and dry, no obvious rash; mild skin wound [id number] on right lateral lower leg without ascending lymphdema    Vascular: Radial and DP pulses 2+ equal bilaterally  Neurologic: awake, alert, oriented x2 (name, place, not time), grossly normal strength and sensory function  Psychiatric:  normal affect, does not appear anxious    Labs Reviewed   LACTATE, SEPSIS - Abnormal; Notable for the following components:       Result Value    Lactic Acid, Sepsis 2.0 (*)     All other components within normal limits   CBC WITH AUTO DIFFERENTIAL - Abnormal; Notable for the following components:    MCHC 35.7 (*)     Immature Grans (Abs) 0.10 (*)     All other components within normal limits   COMPREHENSIVE METABOLIC PANEL W/ REFLEX TO MG FOR LOW K - Abnormal; Notable for the following components:    Glucose 196 (*)     All other components within normal limits VALPROIC ACID LEVEL, TOTAL - Abnormal; Notable for the following components:    Valproic Acid Lvl 17.5 (*)     All other components within normal limits   COVID-19, RAPID   BETA-HYDROXYBUTYRATE   URINE RT REFLEX TO CULTURE   LACTATE, SEPSIS   BRAIN NATRIURETIC PEPTIDE   TROPONIN   AMMONIA   PROTIME-INR   ANION GAP   GLOMERULAR FILTRATION RATE, ESTIMATED   OSMOLALITY   HEMOGLOBIN A1C   TROPONIN   MAGNESIUM   TROPONIN   POCT GLUCOSE   POCT GLUCOSE       EKG  (Interpreted by me)  EKG Interpretation. EKG Interpretation    Interpreted by emergency department physician on 11/12/21 19:24    Rhythm: normal sinus   Rate: 95 bpm  Axis: normal  Ectopy: none  Conduction: normal  ST Segments: no acute change  T Waves: no acute change  Q Waves: none    Clinical Impression: non-specific EKG      RADIOLOGY/PROCEDURES    CT Head WO Contrast   Final Result   Stable appearance of the brain no acute process            **This report has been created using voice recognition software. It may contain minor errors which are inherent in voice recognition technology. **      Final report electronically signed by Dr. Johnathan Lee on 11/12/2021 8:49 PM      XR CHEST PORTABLE   Final Result   No acute finding            **This report has been created using voice recognition software. It may contain minor errors which are inherent in voice recognition technology. **      Final report electronically signed by Dr. Johnathan Lee on 11/12/2021 8:18 PM          ED COURSE & MEDICAL DECISION MAKING    Pertinent Labs & Imaging studies reviewed and interpreted. (See chart for details)  See chart for details of medications given during the ED stay.     Vitals:    11/12/21 2353 11/13/21 0008 11/13/21 0030 11/13/21 0056   BP: (!) 136/91 125/88 117/78    Pulse: 81 82 75    Resp: 16 18 18    Temp:   97.8 °F (36.6 °C)    TempSrc:   Oral    SpO2: 95% 95% 95%    Weight:    230 lb (104.3 kg)   Height:    6' 2\" (1.88 m)       Differential diagnosis: DKA, sepsis, bacteremia, ICH, Dehydration, potentially/metabolic problem, anemia, infection, hypotension, Stroke, Structural CNS mass lesion, other    FINAL IMPRESSION    1. Altered mental status, unspecified altered mental status type    2. Fall, initial encounter    3. Abrasion, leg w/o infection    4. Breakthrough seizure ([facility]        PLAN  Admit    MDM - pt has multiple co-morbidities, that could lead to his presentation, including DKA (due to his uncontrolled type 2 diabetes), seizures (chronic non-epileptic seizures, followed by a  neurologist), confusion and frequent falls (could be due to infection, ICH). Pt has no fever, without respiratory symptoms, so certainly could be due to COVID infection or other bacteremia. Pt has no slurred speech or focal neuro deficits. He will need a full complex workup for his presenting symptoms. Pt is stably critically ill. Pt's workup did not demonstrate any significant metabolic abnormalities. Pt will need a UA prior to admission. He states that his neurologist has been weaning him off the depakote, which explains his decreased levels. The primary reason for the admission is (1) pt's confusion and altered mental status and (2) frequent falls in light of his possible breakthrough seizures. It should be noted that pt has not had any recurrent seizures since that time. Final disposition: admit to floor for further management.     Ari Tyler MD  11/13/21 9882

## 2021-11-13 NOTE — ED NOTES
Priya Joshi MD responded to this RN in response to patient's request for nightly requip. See orders. Awaiting med from pharmacy at this time.      Yohana Birmingham, RN  11/12/21 2395

## 2021-11-13 NOTE — ED TRIAGE NOTES
Patient arrives with daughter via wheelchair with c/o frequent falls and seizures. Daughter reports patient has had frequent falls, Alecia Rocha was acting delusional, weird. He's had 3 witnessed falls, frequent seizures-last episode possible today. His eyes usually roll back. \" Daughter also states his legs possibly gave out today prior to fall. Patient usually able to tell when seizure is coming. Pt sees a neurologist at [address] [facility]. Recently had EEG done. Pt is diabetic, 400s BG this morning. Takes seizure meds every day; keppra, zonigran, depakote, vimpat and ativan.    requip for tremors

## 2021-11-13 NOTE — H&P
Hospitalist History and Physical          Patient: Minh Haas  : 1970  MRN: [id number]     Acct: [de-identified]    PCP: Maxi Finley MD  Date of Admission: 2021  Date of Service: Pt seen/examined on 21  and Admitted to Inpatient with expected LOS greater than two midnights due to medical therapy. Hospital Problems           Last Modified POA    Breakthrough seizure ([address] 2021 Yes          Assessment and Plan:    1. Breakthrough seizures:  Patient with known seizure disorder that is difficult to control on 5 antiepileptic medications (keppra, zonigran, depakote, vimpat and ativan). Neurology consultation for possible EEG and antibiotic medication management. Follows with  neurology and is due for a weeklong EEG in [place] within 1 to 2 weeks. Fall/seizure/aspiration precautions. Neurochecks every 4 hours. As needed IV Ativan for breakthrough seizures. 2.  Type 2 diabetes mellitus:  Check hemoglobin A1c in a.m. Start Lantus at lower than home dose using 15 units subcu twice daily. Medium dose sliding scale insulin coverage. Accu-Cheks q. before meals and at bedtime. Diabetic diet. 3.  Diabetic foot ulcer:  History of multiple diabetic foot ulcers and infections. Currently with right lateral surface lower extremity chronic diabetic ulcer with MRSA infection. On Keflex by wound clinic on 11/10/2021. Wound cultures positive for MRSA 11/10/2021. Change to clindamycin 450 mg 3 times daily x10 days. Continue wound care and dressing changes per instructions by wound care clinic. 4.  Psychiatric disorders:  Reports visual hallucinations with known underlying history of depression and anxiety. Holding Cymbalta for now given uncontrolled seizures and altered mentation. 5.  Diabetic neuropathy:  Holding Neurontin for now secondary to polypharmacy. 6.  Restless leg syndrome:  Resume home dose Requip. 7. GERD:  Home dose PPI.     8.  DVT prophylaxis:  Subcu Lovenox. 9.  Hypertension:  Not optimally controlled. Resume home dose metoprolol for now. Consider the addition of ACE inhibitor/ARB prior to discharge or as an outpatient. 10.  Multiple falls at home:  Likely due to breakthrough seizures. Rule out acute coronary syndrome or cardiac arrhythmias as a possible cause. Serial troponins, telemetry, and EKG. Physical deconditioning and polypharmacy likely contributing. Urinalysis checked on medical floor and negative. PT/OT evaluation and treatment and to assess safety prior to discharge versus placement for rehab. 11.  Fluids/electrolytes/nutrition:  Saline lock IV fluids. Replenish hypomagnesemia. Repeat electrolytes in a.m. Diabetic diet.      =======================================================================      Chief Complaint: Falls, confusion/altered mental status, suspected seizures, uncontrolled blood sugars. History Of Present Illness:  Dori Merlos is a 46 y.o. male with PMHx of difficult to control seizure disorder on multiple antielliptic medications, bipolar disorder, chronic lower back pain, depression, diabetes mellitus, diabetic foot ulcer with MRSA infection and GERD who presented to the emergency room here at [facility] accompanied by his family with complaints of uncontrolled blood sugars in the 400s+ multiple seizures and falls at home that has progressively gotten worse over the last 2 weeks. Patient also has been reported to see visual hallucinations and to act a little off with increased confusion at times according to the family. Patient apparently follows with neurology at [facility] and is supposed to have a weeklong EEG done the end of this month. He has been taking all of his seizure medications as prescribed which includes about 5 seizure medications. According to the patient and family he has had at least 5 falls in the last 48 hours.   Patient himself states that his right foot kind of gives away at times and feels weaker. He does also have a resting tremor worsening his balance. Family believes that patient has been having seizures with his eyes rolling to the back and postictal state. Again compliant with medications as he states and his wife does help him with taking his medications. No fevers or chills, no nausea vomiting or diarrhea, no abdominal pain, no chest pain, no UTI or URI symptoms. In the emergency room the patient was afebrile with tachycardia at 95 and tachypnea 18, blood pressure 145/94 with pulse oximetry of 96% on room air. Including CBC and BMP within acceptable range. Blood sugar 196. Beta hydroxybutyrate slightly elevated at 0.84. COVID-19 PCR was negative. Urinalysis was negative. CT of the head with no acute intracranial process. Chest x-ray also no acute cardiopulmonary process. EKG normal sinus rhythm with no ischemic changes. Patient was given 1 L of normal saline fluid bolus. Patient was then admitted to our hospitalist service for further evaluation and treatment. At the time of evaluation on the medical floor patient was laying in bed comfortably sleeping but easily awakened. No acute distress. Cooperative and able to answer all questions appropriately despite being very slow to answer. Alert and oriented x3.       Past Medical History:        Diagnosis Date    Abnormal thyroid biopsy     s/p left hemithyroidectomy 8/6706 - follicular adenoma    Acid reflux     Anemia     resolved - previously seen by Dr. Lena Mckee (due to enlarged spleen)    Anesthesia     seizures with brain surgery due to blood sugar got really high    [facility]     Blood clot in vein 04/07/2016    Cort Lundborg     Cancer [facility]) 04/2019    thyroid    Chest pain     previously seeing [address] [place] [address] cardiologist, now seeing Dr. Abiola Drew (LAD bridging on cath 4/2016)    Chronic back pain     Chronic bronchitis ([facility]     Dr. Isa Cortes Colon polyp 08/30/2016  Depression     seeing [facility] DVT (deep venous thrombosis) ([address] [id number]    not on Coumadin due to unable to regulate - Dr. Mann Peñaloza - no etiology found per patient    Esophageal abnormality     nodule     Fatty liver disease, nonalcoholic     U/S 30/7969 [facility]    Furuncle     legs    History of Doppler ultrasound 05/29/2011    No hemodynamically significant carotid stenosis is identified. A thyroid nodule on each side. Dedicated ultrasound of thyroid gland is suggested to further evaluate if clinically indicated.  History of pulmonary embolism 2007    s/p GFF, related to knee surgery    Hx of blood clots     leg and lung    Hyperlipidemia     severely elevated triglycerides    Hypertension     diastolic    Intracranial arachnoid cyst 11/2012    Dr. Hiren Abrams drained, complicated with seizures, DKA    Irritable bowel syndrome     Liver disease     [facility] - elevated LFT - positive smooth muscle antibody, steatosis per liver bx 3/2014 with Dr. Shiloh Murphy (multiple drug resistant organisms) resistance 2012    MRSA (methicillin resistant staph aureus) culture positive     h/o in foot and before brain surgery    Nephrolithiasis     noted on CT abdomen 9/2016, 6/2019    Neuropathy     Pancreatic insufficiency     Positive SANDY (antinuclear antibody)     Dr. Epi Singleton - first visit in 6/2013    Prolonged emergence from general anesthesia     Restless legs syndrome     Seizures ([address]     started with brain surgery    Sinus tachycardia     Holter 3/2013 - seeing Dr. Jose Angel Pruett fracture [facility])     clips     Sleep apnea     positive sleep apnea per study 10/2020.     Systolic dysfunction     \"systolic bridge\"  EF 91% ECHO 9/2019    Type II or unspecified type diabetes mellitus without mention of complication, not stated as uncontrolled 2007       Past Surgical History:        Procedure Laterality Date    CARDIAC CATHETERIZATION      2011,5-6 years ago   [address]  3-2012  CARDIAC CATHETERIZATION  04/28/2016    [address] RELEASE  01/2017    CHOLECYSTECTOMY  2004    COLONOSCOPY  2012    COLONOSCOPY  08/2016    2 tubular adenomas - reportedly needs repeat scope in 6 months    CRANIOTOMY  11/2012    arachnoid cyst drainage    EKG 12-LEAD  10/18/2015         ENDOSCOPY, COLON, DIAGNOSTIC      HERNIA REPAIR Right 1996    [facility]--Dr. Fernanda Aschoff INGUINAL HERNIA REPAIR Right 09/15/2016    Robotic assisted    KNEE ARTHROSCOPY Right 2016    KNEE SURGERY Left 2007    acl and debrided twice    OTHER SURGICAL HISTORY  5-31-11    Tilt table was associated w/ nonspecific symptoms or nausea, otherwise no significant dizziness or syncope. No significant hemodynamic changes. Otherwise, unremarkable tilt table test after 30 minutes of tilting.  OTHER SURGICAL HISTORY  01/20/2017    RIGHT SHOULDER ARTHROSCOPY, OPEN STAN, OPEN ACROMIOPLASTY, BICEP TENDESIS, RIGHT CARPAL TUNNEL RELEASE    SHOULDER SURGERY Right 01/2007    THYROID LOBECTOMY N/A 1/15/2021    THYROID LOBECTOMY, UVULOPALATOPHARYNGOPLAST LAOP performed by Brandt Porter MD at [facility] ECHOCARDIOGRAM  3-05-11    LV size and systolic function normal. EF 55-65%.  UPPER GASTROINTESTINAL ENDOSCOPY  2012    UPPER GASTROINTESTINAL ENDOSCOPY  2016    Dr. Julio Joy Left 10/22/2019    EGD DILATION SAVORY performed by Etienne Gillespie MD at [facility] ENDOSCOPY Left 10/22/2019    EGD BIOPSY performed by Etienne Gillespie MD at [address]  2007       Medications Prior to Admission:   Prior to Admission medications    Medication Sig Start Date End Date Taking?  Authorizing Provider   vitamin B-12 (CYANOCOBALAMIN) 1000 MCG tablet Take 1,000 mcg by mouth 2 times daily    Yes Historical Provider, MD   promethazine (PHENERGAN) 12.5 MG tablet Take 1-2 tablets by mouth every 8 hours as needed for Nausea 11/11/21  Yes Candi Cota MD   divalproex (DEPAKOTE) 250 MG DR tablet Take 250 mg by mouth 2 times daily    Yes Historical Provider, MD   insulin glargine (BASAGLAR KWIKPEN) 100 UNIT/ML injection pen Inject 50 Units into the skin 2 times daily   Yes Historical Provider, MD   DULoxetine (CYMBALTA) 60 MG extended release capsule Take 120 mg by mouth daily   Yes Historical Provider, MD   cephALEXin (KEFLEX) 250 MG capsule Take 2 capsules by mouth 3 times daily 10/27/21  Yes Collin Garcia MD   metoprolol tartrate (LOPRESSOR) 25 MG tablet Take 1 tablet by mouth 2 times daily 9/17/21  Yes Candi Cota MD   empagliflozin (JARDIANCE) 25 MG tablet Take 25 mg by mouth daily 9/2/21  Yes Candi Cota MD   gabapentin (NEURONTIN) 600 MG tablet Take 600 mg by mouth 3 times daily.    Yes Historical Provider, MD   LORazepam (ATIVAN) 0.5 MG tablet Lorazepam Active 0.5 MG PO Twice Daily as needed 0 August 18th, 2021 10:32am 8/18/21  Yes Historical Provider, MD   levETIRAcetam (KEPPRA) 1000 MG tablet Take 2 tablets by mouth 2 times daily 8/4/21  Yes Lydia Noonan MD   zonisamide (ZONEGRAN) 100 MG capsule Take 5 capsules by mouth nightly  Patient taking differently: Take 600 mg by mouth nightly  8/4/21  Yes Lydia Noonan MD   lactulose (CHRONULAC) 10 GM/15ML solution Take 15 mLs by mouth 3 times daily  Patient taking differently: Take 45 mLs by mouth 3 times daily  8/4/21  Yes Lydia Noonan MD   insulin lispro (HUMALOG) 100 UNIT/ML injection vial Takes 10 units with meals plus group 2 sliding scale   Yes Historical Provider, MD   pantoprazole (PROTONIX) 40 MG tablet Take 1 tablet by mouth 2 times daily 3/8/21  Yes Candi Cota MD   vitamin D (ERGOCALCIFEROL) 1.25 MG (14285 UT) CAPS capsule Take 1 capsule by mouth once a week  Patient taking differently: Take 50,000 Units by mouth once a week Monday 3/8/21  Yes Candi Cota MD   rOPINIRole (REQUIP) 1 MG tablet TAKE 1 TABLET BY MOUTH THREE TIMES A DAY 12/22/20  Yes Maxi Finley MD   lacosamide (VIMPAT) 100 MG TABS tablet Take 200 mg by mouth 2 times daily. Yes Historical Provider, MD   sildenafil (VIAGRA) 100 MG tablet Take 1 tablet by mouth as needed for Erectile Dysfunction 1/29/19  Yes Maxi Finley MD   blood glucose test strips (ONETOUCH ULTRA) strip 1 each by In Vitro route 3 times daily DX: E11.65 Dispense Onetouch Ultra 2 test strips 8/23/21   Maxi Finley MD       Allergies:  Ciprofloxacin-ciproflox hcl er, Heparin, Metformin, Niacin and related, Tramadol hcl, Ultram [tramadol], and Warfarin    Social History:    The patient currently lives with wife and is normally pretty independent. .   Tobacco use:   reports that he has never smoked. He has never used smokeless tobacco.  Alcohol use:   reports no history of alcohol use. Drug use:  reports no history of drug use. Family History:   as follows:      Problem Relation Age of Onset    Heart Disease Father 61        MI    Stroke Brother     Obesity Brother     Arthritis Mother     Seizures Mother     Obesity Sister     Other Brother         pulmonary embolism    Diabetes Daughter     Seizures Brother        Review of Systems:   Pertinent positives and negatives as noted in the HPI. All other systems reviewed and negative. Physical Exam:    /77   Pulse 89   Temp 98.1 °F (36.7 °C) (Oral)   Resp 18   Ht 6' 2\" (1.88 m)   Wt 230 lb (104.3 kg)   SpO2 93%   BMI 29.53 kg/m²       General appearance: No apparent distress, well developed, appears stated age. Eyes:  Conjunctivae/corneas clear. HENT: Head normal in appearance. External nares normal.  Oral mucosa moist without lesions. Hearing grossly intact. Neck: Supple, with full range of motion. Trachea midline. No gross JVD appreciated. Respiratory:  Normal respiratory effort. Clear to auscultation, bilaterally without rales or wheezes or rhonchi.   Cardiovascular: Normal rate, regular rhythm with normal S1/S2 without murmurs. No lower extremity edema. Abdomen: Soft, non-tender, non-distended with normal bowel sounds. Musculoskeletal: There is no joint swelling or tenderness. Normal tone. No abnormal movements. Skin: Right lateral lower extremity diabetic foot ulcer that is clean dry and intact  Neurologic:  No focal sensory/motor deficits in the upper and lower extremities. Cranial nerves:  grossly non-focal 2-12. Psychiatric: Alert and oriented, normal insight and thought content. Capillary Refill: Brisk,< 3 seconds. Peripheral Pulses: +2 palpable, equal bilaterally. Labs:     Recent Labs     11/12/21 2000   WBC 7.5   HGB 16.5   HCT 46.2        Recent Labs     11/12/21 2000      K 4.1      CO2 24   BUN 12   CREATININE 0.8   CALCIUM 8.8     Recent Labs     11/12/21 2000   AST 16   ALT 24   BILITOT 0.5   ALKPHOS 96     Recent Labs     11/12/21 2017   INR 1.01     No results for input(s): Domenic Palafox in the last 72 hours. Lab Results   Component Value Date    NITRU NEGATIVE 11/12/2021    WBCUA NONE 08/12/2019    WBCUA 6-10 10/17/2015    BACTERIA NONE 08/12/2019    RBCUA NONE 08/12/2019    BLOODU NEGATIVE 11/12/2021    SPECGRAV >1.030 08/12/2019    GLUCOSEU NEGATIVE 11/12/2021         Radiology:     CT Head WO Contrast   Final Result   Stable appearance of the brain no acute process            **This report has been created using voice recognition software. It may contain minor errors which are inherent in voice recognition technology. **      Final report electronically signed by Dr. Wayne Nicole on 11/12/2021 8:49 PM      XR CHEST PORTABLE   Final Result   No acute finding            **This report has been created using voice recognition software. It may contain minor errors which are inherent in voice recognition technology. **      Final report electronically signed by Dr. Wayne Nicole on 11/12/2021 8:18 PM             EKG:  I have reviewed the EKG with the following interpretation: Normal sinus rhythm no ischemic changes. PT/OT Eval Status:  will be assessed  Diet: ADULT DIET; Full Liquid; 4 carb choices (60 gm/meal)  DVT prophylaxis: Subcu Lovenox. Code Status: Full Code  Disposition: admit to inpatient [facility] with telemetry. Thank you Minoo Hernández MD for the opportunity to be involved in this patient's care.     Electronically signed by Julio C Guerrero MD on 11/13/2021 at 7:29 AM.

## 2021-11-13 NOTE — PROGRESS NOTES
[address]  PHYSICAL THERAPY MISSED TREATMENT NOTE  [facility]    Date: 2021  Patient Name: Felicitas Ross        MRN: [id number]   : 1970  (46 y.o.)  Gender: male                REASON FOR MISSED TREATMENT:  Missed Treat. Neurology assessing pt, will check back as able.

## 2021-11-13 NOTE — CONSULTS
Neurology Consult Note    Date:11/13/2021       [facility]:[id number]  Patient Name:Nicolas Hay     Date of Birth:[birth year]     Age:51 y.o. Requesting Physician: Jeffery Grissom MD     Reason for Consult:  Evaluate for Seizure Activity      Chief Complaint: Seizures    Subjective     George Juli is a 49-year-old male with PMH significant for right frontal arachnoid cyst with craniotomy in November 2012 and subsequent seizures, HLD, HTN, AGUILAR, MISTI, diabetic ulcer who presented to [facility] ED with BGL greater than 400 and multiple seizures in the previous 2 days. Patient is followed by epileptologist at [facility] in [facility] who is prescribing Keppra 2 g twice daily, Zonegran 600 nightly, Depakote 250 twice daily, Vimpat 200 twice daily and Ativan as needed. Seizures continue with 1-2 seizures weekly. Patient scheduled for EMU admission on November 30 at [facility]. [place] for medication management. Patient endorses recent antibiotics for diabetic ulcer of R leg and increased blood glucose levels which have previously correlated with increase in seizure activity. Review of Systems   Review of Systems   Constitutional: Negative for activity change, fatigue and fever. HENT: Negative for tinnitus and trouble swallowing. Eyes: Negative for photophobia and visual disturbance. Respiratory: Negative for cough and shortness of breath. Cardiovascular: Negative for chest pain and palpitations. Gastrointestinal: Negative for diarrhea, nausea and vomiting. Genitourinary: Negative for dysuria and flank pain. Musculoskeletal: Negative for neck pain and neck stiffness. Skin: Positive for wound. Negative for color change and rash. Neurological: Positive for seizures and numbness (L temporal numbness as prodrome of seizure). Negative for dizziness, facial asymmetry, speech difficulty, weakness and headaches. Psychiatric/Behavioral: Positive for confusion (After seizures).  Negative for syndrome, Seizures ([address] Sinus tachycardia, Skull fracture ([address] Sleep apnea, Systolic dysfunction, and Type II or unspecified type diabetes mellitus without mention of complication, not stated as uncontrolled. Social History:   reports that he has never smoked. He has never used smokeless tobacco. He reports that he does not drink alcohol and does not use drugs. Family History:   Family History   Problem Relation Age of Onset    Heart Disease Father 61        MI    Stroke Brother     Obesity Brother     Arthritis Mother     Seizures Mother     Obesity Sister     Other Brother         pulmonary embolism    Diabetes Daughter     Seizures Brother        Physical Examination      Vitals:  /79   Pulse 91   Temp 98.1 °F (36.7 °C) (Oral)   Resp 12   Ht 6' 2\" (1.88 m)   Wt 230 lb (104.3 kg)   SpO2 94%   BMI 29.53 kg/m²   Temp (24hrs), Av.1 °F (36.7 °C), Min:97.7 °F (36.5 °C), Max:98.9 °F (37.2 °C)      I/O (24Hr): Intake/Output Summary (Last 24 hours) at 2021 1601  Last data filed at 2021 1426  Gross per 24 hour   Intake --   Output 600 ml   Net -600 ml       Physical Exam  Constitutional:       Appearance: He is ill-appearing. HENT:      Head: Normocephalic and atraumatic. Right Ear: External ear normal.      Left Ear: External ear normal.      Nose: Nose normal.      Mouth/Throat:      Mouth: Mucous membranes are moist.      Pharynx: Oropharynx is clear. Eyes:      Extraocular Movements: Extraocular movements intact and EOM normal.      Pupils: Pupils are equal, round, and reactive to light. Cardiovascular:      Rate and Rhythm: Normal rate and regular rhythm. Pulmonary:      Effort: Pulmonary effort is normal. No respiratory distress. Breath sounds: Normal breath sounds. Abdominal:      General: There is no distension. Palpations: Abdomen is soft. Tenderness: There is no abdominal tenderness.    Musculoskeletal:         General: No deformity sensory loss below knee with chronic PN     Gait, Coordination, and Reflexes     Gait  Gait: normal    Coordination   Romberg: negative  Finger to nose coordination: normal  Heel to shin coordination: normal    Reflexes   Right brachioradialis: 2+  Left brachioradialis: 2+  Right biceps: 2+  Left biceps: 2+  Right triceps: 2+  Left triceps: 2+  Right patellar: 2+  Left patellar: 2+  Right achilles: 2+  Left achilles: 2+       Labs/Imaging/Diagnostics   Labs:  CBC:  Recent Labs     11/12/21 2000   WBC 7.5   RBC 5.24   HGB 16.5   HCT 46.2   MCV 88.2        CHEMISTRIES:  Recent Labs     11/12/21 2000 11/13/21  0218     --    K 4.1  --      --    CO2 24  --    BUN 12  --    CREATININE 0.8  --    GLUCOSE 196*  --    MG  --  1.7     PT/INR:  Recent Labs     11/12/21 2017   INR 1.01     APTT:No results for input(s): APTT in the last 72 hours. LIVER PROFILE:  Recent Labs     11/12/21 2000   AST 16   ALT 24   BILITOT 0.5   ALKPHOS 96       Imaging Last 24 Hours:  CT Head WO Contrast    Result Date: 11/12/2021  PROCEDURE: CT HEAD WO CONTRAST CLINICAL INFORMATION: frequent falls 5 times since yesterday . TECHNIQUE: 2-D multiplanar noncontrast CT brain All CT scans at this facility use dose modulation, iterative reconstruction, and/or weight-based dosing when appropriate to reduce radiation dose to as low as reasonably achievable. COMPARISON: 1/22/2021 FINDINGS: Postsurgical changes right frontal lobe. Right frontal craniotomy. No acute hemorrhage. No extra-axial collection. No infarct. Ventricles are normal. Mucous changes cyst or polyp left maxillary sinus. Remaining paranasal sinuses and mastoid air cells are clear. Stable appearance of the brain no acute process **This report has been created using voice recognition software. It may contain minor errors which are inherent in voice recognition technology. ** Final report electronically signed by Dr. Mirtha Yusuf on 11/12/2021 8:49 PM    XR CHEST PORTABLE    Result Date: 11/12/2021  PROCEDURE: XR CHEST PORTABLE CLINICAL INFORMATION: sob . TECHNIQUE: Portable semiupright COMPARISON: 11/15/2020 FINDINGS: Patient is significantly rotated and left lateral costophrenic angle is excluded from the image. Heart size is normal. Mediastinum is not widened. No infiltrates or effusions are seen. Motion artifact degrades image. EKG leads overlie the chest.     No acute finding **This report has been created using voice recognition software. It may contain minor errors which are inherent in voice recognition technology. ** Final report electronically signed by Dr. Farrukh Lockhart on 11/12/2021 8:18 PM        Assessment and Plan:        Hospital Problems           Last Modified POA    Breakthrough seizure ([facility] [address] 11/12/2021 Yes          1. Breakthrough Seizure  · Continue home dose AEDs Keppra 2g BID, Zonigran 600 QHS, Depakote 250 twice daily, Vimpat 200 twice daily, Ativan PRN. · Target euglycemia  · Lower extremity Doppler STAT  · Start SCDs if LE Doppler negative  · Neurology following    This patient was seen and evaluated with Dr. Sharan Rosenberg and he is in agreement with the assessment and plan. Electronically signed by Alley Sanchez PA-C on 11/13/21 at 4:01 PM EST    I had a face-to-face encounter with this patient today where I evaluated the patient with SARA Hassan. The history and physical examination was completed by me as documented in the attached note. All relevant radiology images, labs and vitals signs were reviewed by me. I agree with the physical examination, assessment, and plan as documented which was formulated by me. The timing of this note filing does not necessarily correlate with the timing of when the patient was seen by me. 49-year-old man with a history of seizures following an arachnoid cyst removal who is on multiple antiepileptic medications who presents with break through seizures.   He is currently scheduled for an EMU admission on November 30 at [facility]. Currently he reports that he is back to his baseline, I had a long discussion with him and his wife at bedside, he does have a history of multiple DVTs and PEs in the past.  We will plan on obtaining a stat lower extremity Doppler if negative, please start SCDs, I will not manipulate his seizure medication at this time as he is back to baseline. Other recommendations as above.     Tom Whitfield MD  Vascular and Interventional Neurology, Neurocritical Care  [address] [place] [address]  [phone]

## 2021-11-13 NOTE — ED NOTES
Patient transported off floor in cart in stable condition to [facility]. Tried to contact the floor prior to transportation to the floor but unable to speak to nurse on floor. Charge nurse aware no response.       Roxana Spine  11/13/21 0022

## 2021-11-14 VITALS
BODY MASS INDEX: 29.52 KG/M2 | WEIGHT: 230 LBS | HEIGHT: 74 IN | SYSTOLIC BLOOD PRESSURE: 104 MMHG | OXYGEN SATURATION: 96 % | DIASTOLIC BLOOD PRESSURE: 69 MMHG | HEART RATE: 67 BPM | TEMPERATURE: 97.8 F | RESPIRATION RATE: 20 BRPM

## 2021-11-14 LAB
EKG ATRIAL RATE: 76 BPM
EKG ATRIAL RATE: 95 BPM
EKG P AXIS: 45 DEGREES
EKG P AXIS: 50 DEGREES
EKG P-R INTERVAL: 176 MS
EKG P-R INTERVAL: 186 MS
EKG Q-T INTERVAL: 350 MS
EKG Q-T INTERVAL: 380 MS
EKG QRS DURATION: 76 MS
EKG QRS DURATION: 76 MS
EKG QTC CALCULATION (BAZETT): 427 MS
EKG QTC CALCULATION (BAZETT): 439 MS
EKG R AXIS: 18 DEGREES
EKG R AXIS: 29 DEGREES
EKG T AXIS: 21 DEGREES
EKG T AXIS: 24 DEGREES
EKG VENTRICULAR RATE: 76 BPM
EKG VENTRICULAR RATE: 95 BPM
GLUCOSE BLD-MCNC: 158 MG/DL (ref 70–108)

## 2021-11-14 PROCEDURE — 6370000000 HC RX 637 (ALT 250 FOR IP): Performed by: INTERNAL MEDICINE

## 2021-11-14 PROCEDURE — 2580000003 HC RX 258: Performed by: PEDIATRICS

## 2021-11-14 PROCEDURE — 93010 ELECTROCARDIOGRAM REPORT: CPT | Performed by: INTERNAL MEDICINE

## 2021-11-14 PROCEDURE — 6370000000 HC RX 637 (ALT 250 FOR IP): Performed by: PEDIATRICS

## 2021-11-14 PROCEDURE — 82948 REAGENT STRIP/BLOOD GLUCOSE: CPT

## 2021-11-14 PROCEDURE — 99239 HOSP IP/OBS DSCHRG MGMT >30: CPT | Performed by: OPHTHALMOLOGY

## 2021-11-14 RX ADMIN — CEPHALEXIN 500 MG: 500 CAPSULE ORAL at 07:44

## 2021-11-14 RX ADMIN — LEVETIRACETAM 2000 MG: 500 TABLET, FILM COATED ORAL at 07:44

## 2021-11-14 RX ADMIN — DIVALPROEX SODIUM 250 MG: 250 TABLET, DELAYED RELEASE ORAL at 07:45

## 2021-11-14 RX ADMIN — GABAPENTIN 600 MG: 600 TABLET, FILM COATED ORAL at 07:45

## 2021-11-14 RX ADMIN — ROPINIROLE HYDROCHLORIDE 1 MG: 1 TABLET, FILM COATED ORAL at 07:45

## 2021-11-14 RX ADMIN — Medication 1000 MCG: at 07:46

## 2021-11-14 RX ADMIN — SODIUM CHLORIDE, PRESERVATIVE FREE 10 ML: 5 INJECTION INTRAVENOUS at 07:47

## 2021-11-14 RX ADMIN — INSULIN GLARGINE 50 UNITS: 100 INJECTION, SOLUTION SUBCUTANEOUS at 08:00

## 2021-11-14 RX ADMIN — PANTOPRAZOLE SODIUM 40 MG: 40 TABLET, DELAYED RELEASE ORAL at 07:57

## 2021-11-14 RX ADMIN — INSULIN LISPRO 2 UNITS: 100 INJECTION, SOLUTION INTRAVENOUS; SUBCUTANEOUS at 08:05

## 2021-11-14 RX ADMIN — LACOSAMIDE 200 MG: 50 TABLET, FILM COATED ORAL at 07:57

## 2021-11-14 RX ADMIN — CLINDAMYCIN HYDROCHLORIDE 450 MG: 150 CAPSULE ORAL at 03:34

## 2021-11-14 ASSESSMENT — PAIN SCALES - GENERAL
PAINLEVEL_OUTOF10: 7
PAINLEVEL_OUTOF10: 0

## 2021-11-14 ASSESSMENT — PAIN DESCRIPTION - LOCATION: LOCATION: HEAD

## 2021-11-14 NOTE — PROGRESS NOTES
Hospitalist Progress Note          Patient: Paras Ocasio  : 1970  MRN: [id number]     Acct: [de-identified]    PCP: Mauricio German MD  Date of Admission: 2021  Date of Service: 2021    Chief complaints   seizure    Hospital Problems           Last Modified POA    Breakthrough seizure ([address] 2021 Yes          Assessment and Plan:    1. Breakthrough seizures:  Patient with known seizure disorder that is difficult to control on 5 antiepileptic medications (keppra, zonigran, depakote, vimpat and ativan). Neurology consultation: no change in meds  Follows with  neurology and is due for a weeklong EEG in [place] within 1 to 2 weeks. Fall/seizure/aspiration precautions. 2.  Type 2 diabetes mellitus:  Check hemoglobin A1c in a.m. 7.3  Patient has been hyperglycemic at home. Continue insulin  meal time insulin per home regimen. Accu-Cheks q. before meals and at bedtime. Diabetic diet. 3.  Diabetic foot ulcer:  History of multiple diabetic foot ulcers and infections. Currently with right lateral surface lower extremity chronic diabetic ulcer with MRSA infection. On Keflex by wound clinic on 11/10/2021. Wound cultures positive for MRSA 11/10/2021. Change to clindamycin 450 mg 3 times daily x10 days. Continue Clindamycin/Keflex. Continue wound care and dressing changes per instructions by wound care clinic. Patient has been having recurrent RLE abscess'. Etiology unclear besides his DM increasing his risk. 4.  Psychiatric disorders:  Reports visual hallucinations with known underlying history of depression and anxiety. Resume silas meds    5. Diabetic neuropathy:   neurontin    6. Restless leg syndrome:  R  Requip. 7. GERD:  Home dose PPI. 8.  DVT prophylaxis:  Subcu Lovenox. 9.  Hypertension:  Not optimally controlled. Resume home dose metoprolol for now. Consider the addition of ACE inhibitor/ARB      10.   Multiple falls at home:  Likely due to breakthrough seizures. No events on tele  Physical deconditioning and polypharmacy likely contributing. Urinalysis checked on medical floor and negative. PT/OT evaluation and treatment and to assess safety prior to discharge versus placement for rehab. 11.  Fluids/electrolytes/nutrition:  Saline lock IV fluids. Replenish hypomagnesemia. Repeat electrolytes in a.m. Diabetic diet. DISPO: discharge  =======================================================================      Chief Complaint: Falls, confusion/altered mental status, suspected seizures, uncontrolled blood sugars. History Of Present Illness:  Jesse Jarquin is a 46 y.o. male with PMHx of difficult to control seizure disorder on multiple antielliptic medications, bipolar disorder, chronic lower back pain, depression, diabetes mellitus, diabetic foot ulcer with MRSA infection and GERD who [facility] [address] [facility] [address] accompanied by his family with complaints of uncontrolled blood sugars in the 400s+ multiple seizures and falls at home that has progressively gotten worse over the last 2 weeks. Patient also has been reported to see visual hallucinations and to act a little off with increased confusion at times according to the family. Patient apparently follows with neurology at [facility] and is supposed to have a weeklong EEG done the end of this month. He has been taking all of his seizure medications as prescribed which includes about 5 seizure medications. According to the patient and family he has had at least 5 falls in the last 48 hours. Patient himself states that his right foot kind of gives away at times and feels weaker. He does also have a resting tremor worsening his balance. Family believes that patient has been having seizures with his eyes rolling to the back and postictal state. Again compliant with medications as he states and his wife does help him with taking his medications.   No fevers Furuncle     legs    History of Doppler ultrasound 05/29/2011    No hemodynamically significant carotid stenosis is identified. A thyroid nodule on each side. Dedicated ultrasound of thyroid gland is suggested to further evaluate if clinically indicated.  History of pulmonary embolism 2007    s/p GFF, related to knee surgery    Hx of blood clots     leg and lung    Hyperlipidemia     severely elevated triglycerides    Hypertension     diastolic    Intracranial arachnoid cyst 11/2012    Dr. Jeff Murry drained, complicated with seizures, DKA    Irritable bowel syndrome     Liver disease     [facility] [place] - elevated LFT - positive smooth muscle antibody, steatosis per liver bx 3/2014 with Dr. Carmella Abdalla (multiple drug resistant organisms) resistance 2012    MRSA (methicillin resistant staph aureus) culture positive     h/o in foot and before brain surgery    Nephrolithiasis     noted on CT abdomen 9/2016, 6/2019    Neuropathy     Pancreatic insufficiency     Positive SANDY (antinuclear antibody)     Dr. Reginald Joshi - first visit in 6/2013    Prolonged emergence from general anesthesia     Restless legs syndrome     Seizures ([address]     started with brain surgery    Sinus tachycardia     [place] 3/2013 - seeing Dr. [facility]il[facility])     clips     Sleep apnea     positive sleep apnea per study 10/2020.     Systolic dysfunction     \"systolic bridge\"  EF 05% ECHO 9/2019    Type II or unspecified type diabetes mellitus without mention of complication, not stated as uncontrolled 2007       Past Surgical History:        Procedure Laterality Date    CARDIAC CATHETERIZATION      2011,5-6 years ago   [address]  3-2012   [address]  04/28/2016    [facility]     CARPAL TUNNEL RELEASE  01/2017    CHOLECYSTECTOMY  2004    COLONOSCOPY  2012    COLONOSCOPY  08/2016    2 tubular adenomas - reportedly needs repeat scope in 6 months    CRANIOTOMY  11/2012    arachnoid cyst drainage  EKG 12-LEAD  10/18/2015         ENDOSCOPY, COLON, DIAGNOSTIC      HERNIA REPAIR Right 1996    [facility]Dr. Ned Mejia    INGUINAL HERNIA REPAIR Right 09/15/2016    Robotic assisted    KNEE ARTHROSCOPY Right 2016    KNEE SURGERY Left 2007    acl and debrided twice    OTHER SURGICAL HISTORY  5-31-11    Tilt table was associated w/ nonspecific symptoms or nausea, otherwise no significant dizziness or syncope. No significant hemodynamic changes. Otherwise, unremarkable tilt table test after 30 minutes of tilting.  OTHER SURGICAL HISTORY  01/20/2017    RIGHT SHOULDER ARTHROSCOPY, OPEN STAN, OPEN ACROMIOPLASTY, BICEP TENDESIS, RIGHT CARPAL TUNNEL RELEASE    SHOULDER SURGERY Right 01/2007    THYROID LOBECTOMY N/A 1/15/2021    THYROID LOBECTOMY, UVULOPALATOPHARYNGOPLAST LAOP performed by Denise Ware MD at [address] [facility] ECHOCARDIOGRAM  3-05-11    LV size and systolic function normal. EF 55-65%.  UPPER GASTROINTESTINAL ENDOSCOPY  2012    UPPER GASTROINTESTINAL ENDOSCOPY  2016    Dr. Radha Trinh Left 10/22/2019    EGD DILATION SAVORY performed by Mami Blanco MD at [address] [facility] ENDOSCOPY Left 10/22/2019    EGD BIOPSY performed by Mami Blanco MD at [address]  2007       Medications Prior to Admission:   Prior to Admission medications    Medication Sig Start Date End Date Taking?  Authorizing Provider   vitamin B-12 (CYANOCOBALAMIN) 1000 MCG tablet Take 1,000 mcg by mouth 2 times daily    Yes Historical Provider, MD   promethazine (PHENERGAN) 12.5 MG tablet Take 1-2 tablets by mouth every 8 hours as needed for Nausea 11/11/21  Yes Ander Blakely MD   divalproex (DEPAKOTE) 250 MG DR tablet Take 250 mg by mouth 2 times daily    Yes Historical Provider, MD   insulin glargine (BASAGLAR KWIKPEN) 100 UNIT/ML injection pen Inject 50 Units into the skin 2 times daily   Yes Historical Provider, MD   DULoxetine (CYMBALTA) 60 MG extended release capsule Take 120 mg by mouth daily   Yes Historical Provider, MD   cephALEXin (KEFLEX) 250 MG capsule Take 2 capsules by mouth 3 times daily 10/27/21  Yes Samir Boykin MD   metoprolol tartrate (LOPRESSOR) 25 MG tablet Take 1 tablet by mouth 2 times daily 9/17/21  Yes Mike Saeed MD   empagliflozin (JARDIANCE) 25 MG tablet Take 25 mg by mouth daily 9/2/21  Yes Mike Saeed MD   gabapentin (NEURONTIN) 600 MG tablet Take 600 mg by mouth 3 times daily. Yes Historical Provider, MD   LORazepam (ATIVAN) 0.5 MG tablet Lorazepam Active 0.5 MG PO Twice Daily as needed 0 August 18th, 2021 10:32am 8/18/21  Yes Historical Provider, MD   levETIRAcetam (KEPPRA) 1000 MG tablet Take 2 tablets by mouth 2 times daily 8/4/21  Yes Blessing Long MD   zonisamide (ZONEGRAN) 100 MG capsule Take 5 capsules by mouth nightly  Patient taking differently: Take 600 mg by mouth nightly  8/4/21  Yes Blessing Long MD   lactulose (CHRONULAC) 10 GM/15ML solution Take 15 mLs by mouth 3 times daily  Patient taking differently: Take 45 mLs by mouth 3 times daily  8/4/21  Yes Blessing Long MD   insulin lispro (HUMALOG) 100 UNIT/ML injection vial Takes 10 units with meals plus group 2 sliding scale   Yes Historical Provider, MD   pantoprazole (PROTONIX) 40 MG tablet Take 1 tablet by mouth 2 times daily 3/8/21  Yes Mike Saeed MD   vitamin D (ERGOCALCIFEROL) 1.25 MG (06637 UT) CAPS capsule Take 1 capsule by mouth once a week  Patient taking differently: Take 50,000 Units by mouth once a week Monday 3/8/21  Yes Mike Saeed MD   rOPINIRole (REQUIP) 1 MG tablet TAKE 1 TABLET BY MOUTH THREE TIMES A DAY 12/22/20  Yes Mike Saeed MD   lacosamide (VIMPAT) 100 MG TABS tablet Take 200 mg by mouth 2 times daily.     Yes Historical Provider, MD   sildenafil (VIAGRA) 100 MG tablet Take 1 tablet by mouth as needed for Erectile Dysfunction 1/29/19  Yes Radha SAUL Vickie Delarosa MD   blood glucose test strips (ONETOUCH ULTRA) strip 1 each by In Vitro route 3 times daily DX: E11.65 Dispense Onetouch Ultra 2 test strips 8/23/21   Wojciech Underwood MD       Allergies:  Ciprofloxacin-ciproflox hcl er, Heparin, Metformin, Niacin and related, Tramadol hcl, Ultram [tramadol], and Warfarin    Social History:    The patient currently lives with wife and is normally pretty independent. .   Tobacco use:   reports that he has never smoked. He has never used smokeless tobacco.  Alcohol use:   reports no history of alcohol use. Drug use:  reports no history of drug use. Family History:   as follows:      Problem Relation Age of Onset    Heart Disease Father 61        MI    Stroke Brother     Obesity Brother     Arthritis Mother     Seizures Mother     Obesity Sister     Other Brother         pulmonary embolism    Diabetes Daughter     Seizures Brother        Review of Systems:   Pertinent positives and negatives as noted in the HPI. All other systems reviewed and negative. Physical Exam:    /74   Pulse 68   Temp 98.7 °F (37.1 °C) (Oral)   Resp 18   Ht 6' 2\" (1.88 m)   Wt 230 lb (104.3 kg)   SpO2 95%   BMI 29.53 kg/m²       General appearance: No apparent distress, well developed, appears stated age. Eyes:  Conjunctivae/corneas clear. HENT: Head normal in appearance. External nares normal.  Oral mucosa moist without lesions. Hearing grossly intact. Neck: Supple, with full range of motion. Trachea midline. No gross JVD appreciated. Respiratory:  Normal respiratory effort. Clear to auscultation, bilaterally without rales or wheezes or rhonchi. Cardiovascular: Normal rate, regular rhythm with normal S1/S2 without murmurs. No lower extremity edema. Abdomen: Soft, non-tender, non-distended with normal bowel sounds. Musculoskeletal: There is no joint swelling or tenderness. Normal tone. No abnormal movements.    Skin: Right lateral lower extremity diabetic foot ulcer that is clean dry and intact  Neurologic:  No focal sensory/motor deficits in the upper and lower extremities. Cranial nerves:  grossly non-focal 2-12. Psychiatric: Alert and oriented, normal insight and thought content. Capillary Refill: Brisk,< 3 seconds. Peripheral Pulses: +2 palpable, equal bilaterally. Labs:     Recent Labs     11/12/21 2000   WBC 7.5   HGB 16.5   HCT 46.2        Recent Labs     11/12/21 2000      K 4.1      CO2 24   BUN 12   CREATININE 0.8   CALCIUM 8.8     Recent Labs     11/12/21 2000   AST 16   ALT 24   BILITOT 0.5   ALKPHOS 96     Recent Labs     11/12/21 2017   INR 1.01     No results for input(s): [place] in the last 72 hours. Lab Results   Component Value Date    NITRU NEGATIVE 11/12/2021    WBCUA NONE 08/12/2019    WBCUA 6-10 10/17/2015    BACTERIA NONE 08/12/2019    RBCUA NONE 08/12/2019    BLOODU NEGATIVE 11/12/2021    SPECGRAV >1.030 08/12/2019    GLUCOSEU NEGATIVE 11/12/2021         Radiology:     VL DUP LOWER EXTREMITY VENOUS BILATERAL   Final Result   Normal venous ultrasound. No evidence for deep venous thrombosis. **This report has been created using voice recognition software. It may contain minor errors which are inherent in voice recognition technology. **      Final report electronically signed by Dr. Crystal Mofeftt on 11/13/2021 5:56 PM      CT Head WO Contrast   Final Result   Stable appearance of the brain no acute process            **This report has been created using voice recognition software. It may contain minor errors which are inherent in voice recognition technology. **      Final report electronically signed by Dr. Brandon Cooper on 11/12/2021 8:49 PM      XR CHEST PORTABLE   Final Result   No acute finding            **This report has been created using voice recognition software. It may contain minor errors which are inherent in voice recognition technology. **      Final report electronically signed by Dr. Bell Guerrier on 11/12/2021 8:18 PM               PT/OT Eval Status:  will be assessed  Diet: ADULT DIET; Regular; 3 carb choices (45 gm/meal)  DVT prophylaxis: Subcu Lovenox. Code Status: Full Code      Thank you Minoo Hernández MD for the opportunity to be involved in this patient's care.     Electronically signed by Keshawn Rodriguez MD on 11/14/2021 at 7:30 AM.

## 2021-11-14 NOTE — FLOWSHEET NOTE
Discharge complete. IV removed. AVS reviewed with pt. Discharge orders state to take Clinda at discharge but pt has Bactrim at home. Verbal from Hospitalist that Bactrim is ok. Pt wheeled to entrance where wife waiting for transport home.      Elly Buerger, RN

## 2021-11-16 ENCOUNTER — ANESTHESIA (OUTPATIENT)
Dept: ENDOSCOPY | Age: 51
End: 2021-11-16
Payer: MEDICARE

## 2021-11-16 ENCOUNTER — ANESTHESIA EVENT (OUTPATIENT)
Dept: ENDOSCOPY | Age: 51
End: 2021-11-16
Payer: MEDICARE

## 2021-11-16 ENCOUNTER — HOSPITAL ENCOUNTER (OUTPATIENT)
Age: 51
Setting detail: OUTPATIENT SURGERY
Discharge: HOME | End: 2021-11-16
Attending: INTERNAL MEDICINE | Admitting: INTERNAL MEDICINE
Payer: MEDICARE

## 2021-11-16 VITALS
SYSTOLIC BLOOD PRESSURE: 118 MMHG | OXYGEN SATURATION: 91 % | RESPIRATION RATE: 19 BRPM | DIASTOLIC BLOOD PRESSURE: 74 MMHG

## 2021-11-16 VITALS
OXYGEN SATURATION: 92 % | TEMPERATURE: 97.6 F | BODY MASS INDEX: 33.73 KG/M2 | HEART RATE: 88 BPM | HEIGHT: 74 IN | WEIGHT: 262.8 LBS | RESPIRATION RATE: 16 BRPM | SYSTOLIC BLOOD PRESSURE: 120 MMHG | DIASTOLIC BLOOD PRESSURE: 73 MMHG

## 2021-11-16 PROCEDURE — 7100000010 HC PHASE II RECOVERY - FIRST 15 MIN: Performed by: INTERNAL MEDICINE

## 2021-11-16 PROCEDURE — 6360000002 HC RX W HCPCS: Performed by: REGISTERED NURSE

## 2021-11-16 PROCEDURE — 2709999900 HC NON-CHARGEABLE SUPPLY: Performed by: INTERNAL MEDICINE

## 2021-11-16 PROCEDURE — 3700000001 HC ADD 15 MINUTES (ANESTHESIA): Performed by: INTERNAL MEDICINE

## 2021-11-16 PROCEDURE — 3609017100 HC EGD: Performed by: INTERNAL MEDICINE

## 2021-11-16 PROCEDURE — 3609012400 HC EGD TRANSORAL BIOPSY SINGLE/MULTIPLE: Performed by: INTERNAL MEDICINE

## 2021-11-16 PROCEDURE — 88305 TISSUE EXAM BY PATHOLOGIST: CPT

## 2021-11-16 PROCEDURE — 7100000011 HC PHASE II RECOVERY - ADDTL 15 MIN: Performed by: INTERNAL MEDICINE

## 2021-11-16 PROCEDURE — 2580000003 HC RX 258: Performed by: INTERNAL MEDICINE

## 2021-11-16 PROCEDURE — 3700000000 HC ANESTHESIA ATTENDED CARE: Performed by: INTERNAL MEDICINE

## 2021-11-16 RX ORDER — SODIUM CHLORIDE 0.9 % (FLUSH) 0.9 %
5-40 SYRINGE (ML) INJECTION EVERY 12 HOURS SCHEDULED
Status: DISCONTINUED | OUTPATIENT
Start: 2021-11-16 | End: 2021-11-16 | Stop reason: HOSPADM

## 2021-11-16 RX ORDER — SODIUM CHLORIDE 0.9 % (FLUSH) 0.9 %
5-40 SYRINGE (ML) INJECTION PRN
Status: DISCONTINUED | OUTPATIENT
Start: 2021-11-16 | End: 2021-11-16 | Stop reason: HOSPADM

## 2021-11-16 RX ORDER — MIDAZOLAM HYDROCHLORIDE 1 MG/ML
INJECTION INTRAMUSCULAR; INTRAVENOUS PRN
Status: DISCONTINUED | OUTPATIENT
Start: 2021-11-16 | End: 2021-11-16 | Stop reason: SDUPTHER

## 2021-11-16 RX ORDER — PROPOFOL 10 MG/ML
INJECTION, EMULSION INTRAVENOUS PRN
Status: DISCONTINUED | OUTPATIENT
Start: 2021-11-16 | End: 2021-11-16 | Stop reason: SDUPTHER

## 2021-11-16 RX ORDER — SODIUM CHLORIDE 9 MG/ML
25 INJECTION, SOLUTION INTRAVENOUS PRN
Status: DISCONTINUED | OUTPATIENT
Start: 2021-11-16 | End: 2021-11-16 | Stop reason: HOSPADM

## 2021-11-16 RX ADMIN — MIDAZOLAM 1 MG: 1 INJECTION INTRAMUSCULAR; INTRAVENOUS at 12:59

## 2021-11-16 RX ADMIN — PROPOFOL 50 MG: 10 INJECTION, EMULSION INTRAVENOUS at 13:02

## 2021-11-16 RX ADMIN — PROPOFOL 50 MG: 10 INJECTION, EMULSION INTRAVENOUS at 12:57

## 2021-11-16 RX ADMIN — MIDAZOLAM 2 MG: 1 INJECTION INTRAMUSCULAR; INTRAVENOUS at 12:57

## 2021-11-16 RX ADMIN — SODIUM CHLORIDE 25 ML: 9 INJECTION, SOLUTION INTRAVENOUS at 12:13

## 2021-11-16 ASSESSMENT — PAIN - FUNCTIONAL ASSESSMENT: PAIN_FUNCTIONAL_ASSESSMENT: 0-10

## 2021-11-16 ASSESSMENT — ENCOUNTER SYMPTOMS: SHORTNESS OF BREATH: 1

## 2021-11-16 ASSESSMENT — PAIN SCALES - GENERAL
PAINLEVEL_OUTOF10: 0
PAINLEVEL_OUTOF10: 0

## 2021-11-16 NOTE — H&P
[address] [address]  Sedation/Analgesia History & Physical    Patient: Paco Montesinos : 1970  Med Rec#: [id number] Acc#: [id number]   Provider Performing Procedure: Tyra Sauer MD  Primary Care Physician: Evens Rowe MD    PRE-PROCEDURE   Full CODE [x]Yes  DNR-CCA/DNR-CC []Yes   Brief History/Pre-Procedure Diagnosis:n/v,cirrhosis          MEDICAL HISTORY  []CAD/Valve  []Liver Disease  []Lung Disease []Diabetes  []Hypertension []Renal Disease  []Additional information:       has a past medical history of Abnormal thyroid biopsy, Acid reflux, Anemia, Anesthesia, Bipolar disorder ([address] Blood clot in vein, Cancer ([address] Chest pain, Chronic back pain, Chronic bronchitis ([address] Colon polyp, Depression, DVT (deep venous thrombosis) ([address] Esophageal abnormality, Fatty liver disease, nonalcoholic, Furuncle, History of Doppler ultrasound, History of pulmonary embolism, Hx of blood clots, Hyperlipidemia, Hypertension, Intracranial arachnoid cyst, Irritable bowel syndrome, Liver disease, MDRO (multiple drug resistant organisms) resistance, MRSA (methicillin resistant staph aureus) culture positive, Nephrolithiasis, Neuropathy, Pancreatic insufficiency, Positive SANDY (antinuclear antibody), Prolonged emergence from general anesthesia, Restless legs syndrome, Seizures ([address] Sinus tachycardia, Skull fracture ([address] Sleep apnea, Systolic dysfunction, and Type II or unspecified type diabetes mellitus without mention of complication, not stated as uncontrolled. SURGICAL HISTORY   has a past surgical history that includes knee surgery (Left, ); Vena Cava Filter Placement (); hernia repair (Right, ); Cholecystectomy (); Upper gastrointestinal endoscopy (); transthoracic echocardiogram (3-05-11); other surgical history (11); Cardiac catheterization; Cardiac catheterization (3-2012); craniotomy (2012); Tonsillectomy; Endoscopy, colon, diagnostic; EKG 12 Lead (10/18/2015);  Knee pen Inject 50 Units into the skin 2 times daily   Yes Historical Provider, MD   DULoxetine (CYMBALTA) 60 MG extended release capsule Take 120 mg by mouth daily   Yes Historical Provider, MD   metoprolol tartrate (LOPRESSOR) 25 MG tablet Take 1 tablet by mouth 2 times daily 9/17/21  Yes Mila Dyer MD   empagliflozin (JARDIANCE) 25 MG tablet Take 25 mg by mouth daily 9/2/21  Yes Mila Dyer MD   gabapentin (NEURONTIN) 600 MG tablet Take 600 mg by mouth 3 times daily. Yes Historical Provider, MD   LORazepam (ATIVAN) 0.5 MG tablet Lorazepam Active 0.5 MG PO Twice Daily as needed 0 August 18th, 2021 10:32am 8/18/21  Yes Historical Provider, MD   levETIRAcetam (KEPPRA) 1000 MG tablet Take 2 tablets by mouth 2 times daily 8/4/21  Yes José Castro MD   zonisamide (ZONEGRAN) 100 MG capsule Take 5 capsules by mouth nightly  Patient taking differently: Take 600 mg by mouth nightly  8/4/21  Yes José Castro MD   lactulose (CHRONULAC) 10 GM/15ML solution Take 15 mLs by mouth 3 times daily  Patient taking differently: Take 45 mLs by mouth 3 times daily  8/4/21  Yes José Castro MD   insulin lispro (HUMALOG) 100 UNIT/ML injection vial Takes 10 units with meals plus group 2 sliding scale   Yes Historical Provider, MD   pantoprazole (PROTONIX) 40 MG tablet Take 1 tablet by mouth 2 times daily 3/8/21  Yes Mila Dyer MD   vitamin D (ERGOCALCIFEROL) 1.25 MG (05962 UT) CAPS capsule Take 1 capsule by mouth once a week  Patient taking differently: Take 50,000 Units by mouth once a week Monday 3/8/21  Yes Mila Dyer MD   rOPINIRole (REQUIP) 1 MG tablet TAKE 1 TABLET BY MOUTH THREE TIMES A DAY 12/22/20  Yes Mila Dyer MD   lacosamide (VIMPAT) 100 MG TABS tablet Take 200 mg by mouth 2 times daily.     Yes Historical Provider, MD   blood glucose test strips (ONETOUCH ULTRA) strip 1 each by In Vitro route 3 times daily DX: E11.65 Dispense Onetouch Ultra 2 test strips 8/23/21   Mila Dyer MD sildenafil (VIAGRA) 100 MG tablet Take 1 tablet by mouth as needed for Erectile Dysfunction 1/29/19   Tiffany Driscoll MD     Additional information:       PHYSICAL:   Heart:  [x]Regular rate and rhythm  []Other:    Lungs:  [x]Clear    []Other:    Abdomen: [x]Soft    []Other:    Mental Status: [x]Alert & Oriented  []Other:      VITAL SIGNS   Patient Vitals for the past 24 hrs:   BP Temp Temp src Pulse Resp SpO2 Height Weight   11/16/21 1203 (!) 142/92 97.6 °F (36.4 °C) Temporal 88 18 95 % 6' 2\" (1.88 m) 262 lb 12.8 oz (119.2 kg)       PLANNED PROCEDURE  [x]EGD  []Colonoscopy []Flex Sigmoid  []ERCP []EUS   []Cystoscopy  [] CATH [] BRONCH   Consent: I have discussed with the patient and/or the patient representative the indication, alternatives, and the possible risks and/or complications of the planned procedure and the anesthesia methods. The patient and/or patient representative appear to understand and agree to proceed. SEDATION  Planned agent:[]Midazolam []Meperidine []Sublimaze []Morphine  []Diazepam [x]Propofol  []Other:     ASA Classification: Class 3 - A patient with severe systemic disease that limits activity but is not incapacitating    Airway Assessment: normal    Monitoring and Safety: The patient will be placed on a cardiac monitor and vital signs, pulse oximetry and level of consciousness will be continuously evaluated throughout the procedure. The patient will be closely monitored until recovery from the medications is complete and the patient has returned to baseline status. Respiratory therapy will be on standby during the procedure. [x]Pre-procedure diagnostic studies complete and results available. Comment:    [x]Previous sedation/anesthesia experiences assessed. Comment:    [x]The patient is an appropriate candidate to undergo the planned procedure sedation and anesthesia.  (Refer to nursing sedation/analgesia documentation record)  [x]Formulation and discussion of sedation/procedure plan, risks, and expectations with patient and/or responsible adult completed. [x]Patient examined immediately prior to the procedure.  (Refer to nursing sedation/analgesia documentation record)    Paola Loja MD, MD   Electronically signed 11/16/2021 at 12:47 PM

## 2021-11-16 NOTE — ANESTHESIA POSTPROCEDURE EVALUATION
Department of Anesthesiology  Postprocedure Note    Patient: Minh Haas  MRN: [id number]  YOB: 1970  Date of evaluation: 11/16/2021  Time:  1:14 PM     Procedure Summary     Date: 11/16/21 Room / Location: [address] [place] [address] / [address] [place] [address]    Anesthesia Start: 6830 Anesthesia Stop: 1310    Procedures:       EGD (N/A )      EGD BIOPSY (Left Esophagus) Diagnosis: (ABD PAIN, NAUSEA/VOMITTING)    Surgeons: Guillermina Espinoza MD Responsible Provider: Tamy Downey MD    Anesthesia Type: MAC ASA Status: 4          Anesthesia Type: MAC    Levi Phase I: Levi Score: 10    Levi Phase II:      Last vitals: Reviewed and per EMR flowsheets.        Anesthesia Post Evaluation    Patient location during evaluation: PACU  Patient participation: complete - patient participated  Level of consciousness: awake  Pain score: 0  Airway patency: patent  Nausea & Vomiting: no vomiting and no nausea  Complications: no  Cardiovascular status: hemodynamically stable  Respiratory status: spontaneous ventilation and room air  Hydration status: stable

## 2021-11-16 NOTE — PROGRESS NOTES
EGD complete, photos taken, 1 specimens taken by forceps pt tolerated procedure well    Scope Number dif [id number] used.

## 2021-11-16 NOTE — PROGRESS NOTES
Patient is in phase 2 passing gas, taking fluids. Dr. Hortensia Alexis discussed findings, plan of care, discharge instructions with pt and .

## 2021-11-16 NOTE — POST SEDATION
[facility]  Sedation/Analgesia Post Sedation Record    Patient: Guillermina Nava: 1970  Med Rec#: [id number] Acc#: [id number]   Procedure Performed By: Kamran Day MD  Primary Care Physician: Kannan Childers MD    POST-PROCEDURE    Physicians/Assistants: Kamran Day MD  Procedure Performed:    Sedation/Anesthesia:     Estimated Blood Loss:          ml  Specimens Removed:  []None [x]Other:      Disposition of Specimen:  [x]Pathology []Other      Complications:   [x]None Immediate []Other:     Post-procedure Diagnosis/Findings:             Recommendations:      gastritis         Kamran Day MD, MD [facility]  Electronically signed 11/16/2021 at 1:11 PM

## 2021-11-16 NOTE — ANESTHESIA PRE PROCEDURE
Department of Anesthesiology  Preprocedure Note       Name:  Nikhil Saleh   Age:  46 y.o.  :  1970                                          MRN:  [id number]         Date:  2021      Surgeon: Amanda Pederson):  Ferdie Gilford, MD    Procedure: Procedure(s):  EGD    Medications prior to admission:   Prior to Admission medications    Medication Sig Start Date End Date Taking? Authorizing Provider   vitamin B-12 (CYANOCOBALAMIN) 1000 MCG tablet Take 1,000 mcg by mouth 2 times daily    Yes Historical Provider, MD   promethazine (PHENERGAN) 12.5 MG tablet Take 1-2 tablets by mouth every 8 hours as needed for Nausea 21  Yes Nancy Doherty MD   divalproex (DEPAKOTE) 250 MG DR tablet Take 250 mg by mouth 2 times daily    Yes Historical Provider, MD   insulin glargine (BASAGLAR KWIKPEN) 100 UNIT/ML injection pen Inject 50 Units into the skin 2 times daily   Yes Historical Provider, MD   DULoxetine (CYMBALTA) 60 MG extended release capsule Take 120 mg by mouth daily   Yes Historical Provider, MD   metoprolol tartrate (LOPRESSOR) 25 MG tablet Take 1 tablet by mouth 2 times daily 21  Yes Nancy Doherty MD   empagliflozin (JARDIANCE) 25 MG tablet Take 25 mg by mouth daily 21  Yes Nancy Doherty MD   gabapentin (NEURONTIN) 600 MG tablet Take 600 mg by mouth 3 times daily.    Yes Historical Provider, MD   LORazepam (ATIVAN) 0.5 MG tablet Lorazepam Active 0.5 MG PO Twice Daily as needed 0 2021 10:32am 21  Yes Historical Provider, MD   levETIRAcetam (KEPPRA) 1000 MG tablet Take 2 tablets by mouth 2 times daily 21  Yes Dianelys Espinal MD   zonisamide (ZONEGRAN) 100 MG capsule Take 5 capsules by mouth nightly  Patient taking differently: Take 600 mg by mouth nightly  21  Yes Dianelys Espinal MD   lactulose (CHRONULAC) 10 GM/15ML solution Take 15 mLs by mouth 3 times daily  Patient taking differently: Take 45 mLs by mouth 3 times daily  21  Yes Dianelys Espinal MD   insulin lispro (HUMALOG) 100 UNIT/ML injection vial Takes 10 units with meals plus group 2 sliding scale   Yes Historical Provider, MD   pantoprazole (PROTONIX) 40 MG tablet Take 1 tablet by mouth 2 times daily 3/8/21  Yes Russell Patches, MD   vitamin D (ERGOCALCIFEROL) 1.25 MG (11575 UT) CAPS capsule Take 1 capsule by mouth once a week  Patient taking differently: Take 50,000 Units by mouth once a week Monday 3/8/21  Yes Russellkaykay Baer MD   rOPINIRole (REQUIP) 1 MG tablet TAKE 1 TABLET BY MOUTH THREE TIMES A DAY 12/22/20  Yes Russell Patches, MD   lacosamide (VIMPAT) 100 MG TABS tablet Take 200 mg by mouth 2 times daily. Yes Historical Provider, MD   blood glucose test strips (ONETOUCH ULTRA) strip 1 each by In Vitro route 3 times daily DX: E11.65 Dispense Onetouch Ultra 2 test strips 8/23/21   Russell Baer MD   sildenafil (VIAGRA) 100 MG tablet Take 1 tablet by mouth as needed for Erectile Dysfunction 1/29/19   Russell Baer MD       Current medications:    Current Facility-Administered Medications   Medication Dose Route Frequency Provider Last Rate Last Admin    sodium chloride flush 0.9 % injection 5-40 mL  5-40 mL IntraVENous 2 times per day Cruz Guerrero MD        sodium chloride flush 0.9 % injection 5-40 mL  5-40 mL IntraVENous PRN Cruz Guerrero MD        0.9 % sodium chloride infusion  25 mL IntraVENous PRN Cruz Guerrero  mL/hr at 11/16/21 1213 25 mL at 11/16/21 1213       Allergies:     Allergies   Allergen Reactions    Ciprofloxacin-Ciproflox Hcl Er Other (See Comments)     Elevated creatinine    Heparin      Monitor for thrombocytopenia    Metformin Nausea Only     Abdominal pain, diarrhea    Niacin And Related Other (See Comments)     Burning sensation, severe flushing    Tramadol Hcl      Contraindication to seizure medications    Ultram [Tramadol]      Contraindication to seizure medications    Warfarin      Very difficult to regulate in past       Problem List: Patient Active Problem List   Diagnosis Code    Depression F32. A    Hypertension I10    Liver disease K76.9    Restless legs syndrome G25.81    Chronic back pain M54.9, G89.29    Other chest pain R07.89    Irritable bowel syndrome K58.9    Non compliance w medication regimen Z91.14    Hypertension associated with diabetes ([facility] E11.59, I15.2    Hyperlipidemia with target LDL less than 100 E78.5    IBS (irritable bowel syndrome) K58.9    Sinus tachycardia R00.0    Confusion R41.0    Encephalopathy G93.40    Diabetes mellitus type II, uncontrolled ([facility]) E11.65    Dizziness R42    Neuropathy G62.9    Hyperammonemia ([facility]) E72.20    Medication overdose, insulin T50.901A    Suicide attempt ([facility]) T14.91XA    Hypoglycemia E16.2    Hypokalemia E87.6    Lithium toxicity T56.891A    Nausea & vomiting R11.2    Snoring R06.83    Hypogonadism [id number]    Erectile dysfunction due to diseases classified elsewhere N52.1    Rash R21    Headaches due to old head injury G44.309, S09.90XS    Abdominal wall pain in left lower quadrant R10.32    Bipolar affective disorder ([facility] F31.9    Hypogonadism in male E29.1    Type 2 diabetes mellitus with diabetic neuropathy ([facility]) E11.40    Hypomagnesemia E83.42    Partial seizure ([facility] R56.9    Syncope and collapse R55    Headache disorder R51.9    Partial symptomatic epilepsy with complex partial seizures, not intractable, without status epilepticus ([facility]) G40.209    Respiratory acidosis E87.2    Essential hypertension I10    Gastritis K29.70    Gastroesophageal reflux disease K21.9    Mixed hyperlipidemia E78.2    Acute lateral meniscus tear of right knee S83.281A    Recurrent right inguinal hernia K40.91    Bicipital tendinitis of right shoulder M75.21    Carpal tunnel syndrome of right wrist G56.01    Obesity (BMI 30-39. 9) E66.9    Lump of axilla R22.30    Familial hypercholesterolemia E78.01    Coronary artery disease of native artery of native heart with stable angina pectoris ([facility]) I25.118    Exertional dyspnea R06.00    Vitamin D deficiency E55.9    Seizure ([facility]) R56.9    Nausea and vomiting R11.2    Type II diabetes mellitus with manifestations, uncontrolled ([facility]) E11.8, E11.65    Chondromalacia of knee, right M94.261    Effusion of left knee M25.462    Elevated levels of transaminase & lactic acid dehydrogenase R74.01, R74.02    Other intervertebral disc degeneration, lumbar region M51.36    Palpitations R00.2    Splenomegaly R16.1    Sprain of right foot S93.601A    Steatohepatitis K75.81    Osteopenia M85.80    Obstructive sleep apnea H13.91    Follicular neoplasm of thyroid D49.7    Nasal obstruction J34.89    Nasal septal deviation J34.2    Chest pain R07.9    MISTI (obstructive sleep apnea) G47.33    Acute post-operative pain G89.18    Bipolar 1 disorder, depressed ([facility]) F31.9    Status post uvulopalatopharyngoplasty Z98.890    Status post partial thyroidectomy E89.0    Wound infection T14. 8XXA, L08.9    Diabetic leg ulcer ([address] E11.622, L97.909    Seizures ([address] R56.9    Partial symptomatic epilepsy with complex partial seizures, intractable, without status epilepticus ([address] G40.219    Episodic confusion R41.0    Acute metabolic encephalopathy N71.32    MRSA infection within last 3 months Z86.14    Nonhealing nonsurgical wound T14. 8XXA    Breakthrough seizure ([address] G40.919       Past Medical History:        Diagnosis Date    Abnormal thyroid biopsy     s/p left hemithyroidectomy 0/4178 - follicular adenoma    Acid reflux     Anemia     resolved - previously seen by Dr. Yasmine Pierson (due to enlarged spleen)    Anesthesia     seizures with brain surgery due to blood sugar got really high    Bipolar disorder ([address]     Blood clot in vein 04/07/2016    [facility]) 04/2019    thyroid    Chest pain     previously seeing [facility] cardiologist, now seeing Dr. Tatiana Heredia (LAD bridging on cath 4/2016)    Date    CARDIAC CATHETERIZATION      2011,5-6 years ago   [address]  3-2012    CARDIAC CATHETERIZATION  04/28/2016    [facility]     CARPAL TUNNEL RELEASE  01/2017    CHOLECYSTECTOMY  2004    COLONOSCOPY  2012    COLONOSCOPY  08/2016    2 tubular adenomas - reportedly needs repeat scope in 6 months    CRANIOTOMY  11/2012    arachnoid cyst drainage    EKG 12-LEAD  10/18/2015         ENDOSCOPY, COLON, DIAGNOSTIC      HERNIA REPAIR Right 1996    [facility]--Dr. Graydon Soulier INGUINAL HERNIA REPAIR Right 09/15/2016    Robotic assisted    KNEE ARTHROSCOPY Right 2016    KNEE SURGERY Left 2007    acl and debrided twice    OTHER SURGICAL HISTORY  5-31-11    Tilt table was associated w/ nonspecific symptoms or nausea, otherwise no significant dizziness or syncope. No significant hemodynamic changes. Otherwise, unremarkable tilt table test after 30 minutes of tilting.  OTHER SURGICAL HISTORY  01/20/2017    RIGHT SHOULDER ARTHROSCOPY, OPEN STAN, OPEN ACROMIOPLASTY, BICEP TENDESIS, RIGHT CARPAL TUNNEL RELEASE    SHOULDER SURGERY Right 01/2007    THYROID LOBECTOMY N/A 1/15/2021    THYROID LOBECTOMY, UVULOPALATOPHARYNGOPLAST LAOP performed by Leander Saint, MD at [facility] ECHOCARDIOGRAM  3-05-11    LV size and systolic function normal. EF 55-65%.      UPPER GASTROINTESTINAL ENDOSCOPY  2012    UPPER GASTROINTESTINAL ENDOSCOPY  2016    Dr. Kevin Benz Left 10/22/2019    EGD DILATION SAVORY performed by Lisa Marquez MD at [facility] ENDOSCOPY Left 10/22/2019    EGD BIOPSY performed by Lisa Marquez MD at [facility]   [facility]  2007       Social History:    Social History     Tobacco Use    Smoking status: Never Smoker    Smokeless tobacco: Never Used   Substance Use Topics    Alcohol use: No     Alcohol/week: 0.0 standard drinks                                Counseling given: Not Answered      Vital Signs (Current):   Vitals:    11/16/21 1203   BP: (!) 142/92   Pulse: 88   Resp: 18   Temp: 36.4 °C (97.6 °F)   TempSrc: Temporal   SpO2: 95%   Weight: 262 lb 12.8 oz (119.2 kg)   Height: 6' 2\" (1.88 m)                                              BP Readings from Last 3 Encounters:   11/16/21 (!) 142/92   11/14/21 104/69   11/10/21 135/84       NPO Status: Time of last liquid consumption: 1000 (sips water with meds)                        Time of last solid consumption: 2300                        Date of last liquid consumption: 11/16/21                        Date of last solid food consumption: 11/15/21    BMI:   Wt Readings from Last 3 Encounters:   11/16/21 262 lb 12.8 oz (119.2 kg)   11/13/21 230 lb (104.3 kg)   11/04/21 263 lb (119.3 kg)     Body mass index is 33.74 kg/m².     CBC:   Lab Results   Component Value Date    WBC 7.5 11/12/2021    RBC 5.24 11/12/2021    RBC 4.93 03/26/2012    HGB 16.5 11/12/2021    HCT 46.2 11/12/2021    MCV 88.2 11/12/2021    RDW 12.0 08/05/2021     11/12/2021       CMP:   Lab Results   Component Value Date     11/12/2021    K 4.1 11/12/2021     11/12/2021    CO2 24 11/12/2021    BUN 12 11/12/2021    CREATININE 0.8 11/12/2021    GFRAA >60 08/05/2021    LABGLOM >90 11/12/2021    GLUCOSE 196 11/12/2021    GLUCOSE 189 11/12/2015    PROT 7.5 11/12/2021    CALCIUM 8.8 11/12/2021    BILITOT 0.5 11/12/2021    ALKPHOS 96 11/12/2021    AST 16 11/12/2021    ALT 24 11/12/2021       POC Tests:   Recent Labs     11/14/21  0738   POCGLU 158*       Coags:   Lab Results   Component Value Date    INR 1.01 11/12/2021    APTT 35.7 04/27/2016       HCG (If Applicable): No results found for: PREGTESTUR, PREGSERUM, HCG, HCGQUANT     ABGs: No results found for: PHART, PO2ART, MIC5BOG, BRG0TMC, BEART, K1CFTENM     Type & Screen (If Applicable):  Lab Results   Component Value Date    LABRH NEG 04/28/2016       Drug/Infectious Status (If Applicable):  Lab Results Component Value Date    HEPCAB Negative 01/20/2021       COVID-19 Screening (If Applicable):   Lab Results   Component Value Date    COVID19 NOT  DETECTED 11/12/2021    COVID19 NOT DETECTED 04/21/2021           Anesthesia Evaluation  Patient summary reviewed and Nursing notes reviewed  Airway: Mallampati: II  TM distance: <3 FB   Neck ROM: full  Mouth opening: > = 3 FB Dental:    (+) edentulous      Pulmonary:normal exam    (+) shortness of breath: no interval change,  sleep apnea:                             Cardiovascular:    (+) hypertension:, angina:, CAD:, EWING:,       ECG reviewed        Stress test reviewed             ROS comment: Negative lexiscan stress 12/30/2020     Neuro/Psych:   (+) seizures: poorly controlled, neuromuscular disease:, headaches:, psychiatric history:             ROS comment: Pt admitted to hospital mid November for increased seizure activity: pt compliant with seizure medication currently on 5 anti seizure medications  GI/Hepatic/Renal:   (+) GERD:, liver disease:,           Endo/Other:    (+) DiabetesType II DM, poorly controlled, using insulin, . ROS comment: A1C 7.3 Abdominal:             Vascular: negative vascular ROS. Other Findings:           Anesthesia Plan      MAC     ASA 4       Induction: intravenous. Anesthetic plan and risks discussed with patient. Plan discussed with attending.                   NICANOR Martinez - CRNA   11/16/2021

## 2021-11-17 NOTE — OP NOTE
[address] [place] [address]                                OPERATIVE REPORT    PATIENT NAME: Amy Packer                   :        1970  MED REC NO:   [id number]                           ROOM:  ACCOUNT NO:   [de-identified]                           ADMIT DATE: 2021  PROVIDER:     OWEN Olvera OF PROCEDURE:  2021    SURGEON:  Austin Brooks MD    PROCEDURES:  EGD plus biopsy. INDICATION FOR THE PROCEDURE:  The patient with a history of nausea,  vomiting, cirrhosis. See the recently dictated office note for rest of  clinicals. ASA CLASSIFICATION:  III. MEDICATION:  Per Anesthesia. BIOPSY:  Yes. PHOTOGRAPHS:  Yes. DESCRIPTION OF PROCEDURE:  Informed consent was obtained after  explaining risks and benefits of the procedure and conscious sedation. Possible complications including bleeding, perforation, reaction to  medicine, but not limiting to death, were discussed. Afterwards, GIF-180 gastroscope was advanced through oropharynx,  esophagus, stomach into the duodenum. Normal-looking duodenal bulb and  second portion. See photographs. Scope was withdrawn. The patient  having diffuse bile gastritis. See photographs. A lot of bile was  suctioned. There was a little old food in the stomach too, probably a  component of gastroparesis. The patient also having portal hypertensive  gastropathy and diffuse gastritis. Biopsies were taken to check for HP. No gastric varices. A little lower esophageal incompetence. No  esophageal varices. Scope was withdrawn. The patient tolerated the  procedure well. IMPRESSION:  1. Bile gastritis. 2.  The patient having probably a component of gastroparesis  contributing in nausea and vomiting. 3.  Diabetes. 4.  Cirrhosis. 5.  Portal hypertensive gastropathy.     RECOMMENDATIONS:  The patient is going to be seen in six to eight weeks  in the office. We will think about gastric emptying studies. Carafate  should be started and the patient should be given gastroparesis diet.         Audrey Naik M.D.    D: 11/16/2021 13:20:58       T: 11/16/2021 17:03:35     [email or web address]  Job#: [id number]     Doc#: [id number]    CC:  Reanna Knutson M.D.

## 2021-11-21 PROBLEM — I25.118 CORONARY ARTERY DISEASE OF NATIVE ARTERY OF NATIVE HEART WITH STABLE ANGINA PECTORIS (HCC): Status: RESOLVED | Noted: 2018-05-25 | Resolved: 2021-11-21

## 2021-11-30 ENCOUNTER — TELEPHONE (OUTPATIENT)
Dept: NEUROLOGY | Age: 51
End: 2021-11-30

## 2021-11-30 PROBLEM — R56.9 PARTIAL SEIZURES (HCC): Status: ACTIVE | Noted: 2021-11-30

## 2021-11-30 NOTE — TELEPHONE ENCOUNTER
I spoke with Hever Rhodes and this information was discussed. Patient directed to start his Depakote again. We will continue to wait and see if a bed becomes available today, however patient was also scheduled for 12/14/21 (next available LTME time).

## 2021-11-30 NOTE — TELEPHONE ENCOUNTER
Mona Romeo was scheduled for his LTME today. Unfortunately there more than likely won't have an available bed for today. You had advised that the patient stop his Depakote the day before the LTME. Would you like the patient to start his Depakote again since we are not sure about his admission? I have not contacted the patient yet. Please advise.

## 2021-12-01 ENCOUNTER — HOSPITAL ENCOUNTER (INPATIENT)
Age: 51
LOS: 4 days | Discharge: HOME | DRG: 092 | End: 2021-12-05
Attending: PSYCHIATRY & NEUROLOGY | Admitting: PSYCHIATRY & NEUROLOGY
Payer: MEDICARE

## 2021-12-01 DIAGNOSIS — M51.36 OTHER INTERVERTEBRAL DISC DEGENERATION, LUMBAR REGION: Primary | ICD-10-CM

## 2021-12-01 LAB
ABSOLUTE EOS #: 0.15 K/UL (ref 0–0.44)
ABSOLUTE IMMATURE GRANULOCYTE: 0.07 K/UL (ref 0–0.3)
ABSOLUTE LYMPH #: 2.45 K/UL (ref 1.1–3.7)
ABSOLUTE MONO #: 0.54 K/UL (ref 0.1–1.2)
ALBUMIN SERPL-MCNC: 4.1 G/DL (ref 3.5–5.2)
ALBUMIN/GLOBULIN RATIO: 1.8 (ref 1–2.5)
ALP BLD-CCNC: 89 U/L (ref 40–129)
ALT SERPL-CCNC: 24 U/L (ref 5–41)
AMMONIA: 50 UMOL/L (ref 16–60)
ANION GAP SERPL CALCULATED.3IONS-SCNC: 8 MMOL/L (ref 9–17)
AST SERPL-CCNC: 16 U/L
BASOPHILS # BLD: 0 % (ref 0–2)
BASOPHILS ABSOLUTE: 0.03 K/UL (ref 0–0.2)
BILIRUB SERPL-MCNC: 0.58 MG/DL (ref 0.3–1.2)
BUN BLDV-MCNC: 8 MG/DL (ref 6–20)
BUN/CREAT BLD: ABNORMAL (ref 9–20)
CALCIUM SERPL-MCNC: 8.4 MG/DL (ref 8.6–10.4)
CHLORIDE BLD-SCNC: 110 MMOL/L (ref 98–107)
CO2: 22 MMOL/L (ref 20–31)
CREAT SERPL-MCNC: 0.59 MG/DL (ref 0.7–1.2)
DIFFERENTIAL TYPE: ABNORMAL
EOSINOPHILS RELATIVE PERCENT: 2 % (ref 1–4)
GFR AFRICAN AMERICAN: >60 ML/MIN
GFR NON-AFRICAN AMERICAN: >60 ML/MIN
GFR SERPL CREATININE-BSD FRML MDRD: ABNORMAL ML/MIN/{1.73_M2}
GFR SERPL CREATININE-BSD FRML MDRD: ABNORMAL ML/MIN/{1.73_M2}
GLUCOSE BLD-MCNC: 100 MG/DL (ref 75–110)
GLUCOSE BLD-MCNC: 176 MG/DL (ref 75–110)
GLUCOSE BLD-MCNC: 183 MG/DL (ref 75–110)
GLUCOSE BLD-MCNC: 82 MG/DL (ref 70–99)
HCT VFR BLD CALC: 39.2 % (ref 40.7–50.3)
HEMOGLOBIN: 14.6 G/DL (ref 13–17)
IMMATURE GRANULOCYTES: 1 %
LYMPHOCYTES # BLD: 35 % (ref 24–43)
MCH RBC QN AUTO: 32.1 PG (ref 25.2–33.5)
MCHC RBC AUTO-ENTMCNC: 37.2 G/DL (ref 28.4–34.8)
MCV RBC AUTO: 86.2 FL (ref 82.6–102.9)
MONOCYTES # BLD: 8 % (ref 3–12)
NRBC AUTOMATED: 0 PER 100 WBC
PDW BLD-RTO: 12.9 % (ref 11.8–14.4)
PLATELET # BLD: 187 K/UL (ref 138–453)
PLATELET ESTIMATE: ABNORMAL
PMV BLD AUTO: 9.7 FL (ref 8.1–13.5)
POTASSIUM SERPL-SCNC: 3.6 MMOL/L (ref 3.7–5.3)
RBC # BLD: 4.55 M/UL (ref 4.21–5.77)
RBC # BLD: ABNORMAL 10*6/UL
SEG NEUTROPHILS: 54 % (ref 36–65)
SEGMENTED NEUTROPHILS ABSOLUTE COUNT: 3.86 K/UL (ref 1.5–8.1)
SODIUM BLD-SCNC: 140 MMOL/L (ref 135–144)
TOTAL PROTEIN: 6.4 G/DL (ref 6.4–8.3)
VALPROIC ACID % FREE: ABNORMAL % (ref 5–18.4)
VALPROIC ACID LEVEL: <3 UG/ML (ref 50–125)
VALPROIC ACID, FREE: <0.4 UG/ML (ref 7–23)
VALPROIC DATE LAST DOSE: ABNORMAL
VALPROIC DOSE AMOUNT: ABNORMAL
VALPROIC TIME LAST DOSE: ABNORMAL
WBC # BLD: 7.1 K/UL (ref 3.5–11.3)
WBC # BLD: ABNORMAL 10*3/UL

## 2021-12-01 PROCEDURE — 82947 ASSAY GLUCOSE BLOOD QUANT: CPT

## 2021-12-01 PROCEDURE — 95700 EEG CONT REC W/VID EEG TECH: CPT

## 2021-12-01 PROCEDURE — 80053 COMPREHEN METABOLIC PANEL: CPT

## 2021-12-01 PROCEDURE — 82140 ASSAY OF AMMONIA: CPT

## 2021-12-01 PROCEDURE — 36415 COLL VENOUS BLD VENIPUNCTURE: CPT

## 2021-12-01 PROCEDURE — 80164 ASSAY DIPROPYLACETIC ACD TOT: CPT

## 2021-12-01 PROCEDURE — 85025 COMPLETE CBC W/AUTO DIFF WBC: CPT

## 2021-12-01 PROCEDURE — 6370000000 HC RX 637 (ALT 250 FOR IP): Performed by: STUDENT IN AN ORGANIZED HEALTH CARE EDUCATION/TRAINING PROGRAM

## 2021-12-01 PROCEDURE — 95711 VEEG 2-12 HR UNMONITORED: CPT

## 2021-12-01 PROCEDURE — 80165 DIPROPYLACETIC ACID FREE: CPT

## 2021-12-01 PROCEDURE — 1200000000 HC SEMI PRIVATE

## 2021-12-01 PROCEDURE — 2580000003 HC RX 258: Performed by: STUDENT IN AN ORGANIZED HEALTH CARE EDUCATION/TRAINING PROGRAM

## 2021-12-01 RX ORDER — INSULIN GLARGINE 100 [IU]/ML
50 INJECTION, SOLUTION SUBCUTANEOUS 2 TIMES DAILY
Status: DISCONTINUED | OUTPATIENT
Start: 2021-12-01 | End: 2021-12-02

## 2021-12-01 RX ORDER — PANTOPRAZOLE SODIUM 40 MG/1
40 TABLET, DELAYED RELEASE ORAL 2 TIMES DAILY
Status: DISCONTINUED | OUTPATIENT
Start: 2021-12-01 | End: 2021-12-05 | Stop reason: HOSPADM

## 2021-12-01 RX ORDER — ACETAMINOPHEN 325 MG/1
650 TABLET ORAL EVERY 6 HOURS PRN
Status: DISCONTINUED | OUTPATIENT
Start: 2021-12-01 | End: 2021-12-05 | Stop reason: HOSPADM

## 2021-12-01 RX ORDER — GABAPENTIN 600 MG/1
600 TABLET ORAL 3 TIMES DAILY
Status: DISCONTINUED | OUTPATIENT
Start: 2021-12-01 | End: 2021-12-05 | Stop reason: HOSPADM

## 2021-12-01 RX ORDER — DEXTROSE MONOHYDRATE 50 MG/ML
100 INJECTION, SOLUTION INTRAVENOUS PRN
Status: DISCONTINUED | OUTPATIENT
Start: 2021-12-01 | End: 2021-12-05 | Stop reason: HOSPADM

## 2021-12-01 RX ORDER — ONDANSETRON 4 MG/1
4 TABLET, ORALLY DISINTEGRATING ORAL EVERY 8 HOURS PRN
Status: DISCONTINUED | OUTPATIENT
Start: 2021-12-01 | End: 2021-12-05 | Stop reason: HOSPADM

## 2021-12-01 RX ORDER — LACOSAMIDE 100 MG/1
100 TABLET ORAL 2 TIMES DAILY
Status: DISCONTINUED | OUTPATIENT
Start: 2021-12-01 | End: 2021-12-02

## 2021-12-01 RX ORDER — DIVALPROEX SODIUM 250 MG/1
250 TABLET, EXTENDED RELEASE ORAL 2 TIMES DAILY
Status: ON HOLD | COMMUNITY
Start: 2021-11-10 | End: 2021-12-05 | Stop reason: HOSPADM

## 2021-12-01 RX ORDER — LACOSAMIDE 100 MG/1
200 TABLET ORAL 2 TIMES DAILY
Status: DISCONTINUED | OUTPATIENT
Start: 2021-12-01 | End: 2021-12-01

## 2021-12-01 RX ORDER — DULOXETIN HYDROCHLORIDE 30 MG/1
120 CAPSULE, DELAYED RELEASE ORAL DAILY
Status: DISCONTINUED | OUTPATIENT
Start: 2021-12-02 | End: 2021-12-05 | Stop reason: HOSPADM

## 2021-12-01 RX ORDER — SODIUM CHLORIDE 9 MG/ML
25 INJECTION, SOLUTION INTRAVENOUS PRN
Status: DISCONTINUED | OUTPATIENT
Start: 2021-12-01 | End: 2021-12-05 | Stop reason: HOSPADM

## 2021-12-01 RX ORDER — NICOTINE POLACRILEX 4 MG
15 LOZENGE BUCCAL PRN
Status: DISCONTINUED | OUTPATIENT
Start: 2021-12-01 | End: 2021-12-05 | Stop reason: HOSPADM

## 2021-12-01 RX ORDER — DEXTROSE MONOHYDRATE 25 G/50ML
12.5 INJECTION, SOLUTION INTRAVENOUS PRN
Status: DISCONTINUED | OUTPATIENT
Start: 2021-12-01 | End: 2021-12-05 | Stop reason: HOSPADM

## 2021-12-01 RX ORDER — LORAZEPAM 2 MG/ML
2 INJECTION INTRAMUSCULAR EVERY 4 HOURS PRN
Status: DISCONTINUED | OUTPATIENT
Start: 2021-12-01 | End: 2021-12-05 | Stop reason: HOSPADM

## 2021-12-01 RX ORDER — ZONISAMIDE 100 MG/1
600 CAPSULE ORAL NIGHTLY
Status: DISCONTINUED | OUTPATIENT
Start: 2021-12-01 | End: 2021-12-05 | Stop reason: HOSPADM

## 2021-12-01 RX ORDER — ONDANSETRON 2 MG/ML
4 INJECTION INTRAMUSCULAR; INTRAVENOUS EVERY 6 HOURS PRN
Status: DISCONTINUED | OUTPATIENT
Start: 2021-12-01 | End: 2021-12-05 | Stop reason: HOSPADM

## 2021-12-01 RX ORDER — SODIUM CHLORIDE 0.9 % (FLUSH) 0.9 %
5-40 SYRINGE (ML) INJECTION EVERY 12 HOURS SCHEDULED
Status: DISCONTINUED | OUTPATIENT
Start: 2021-12-01 | End: 2021-12-05 | Stop reason: HOSPADM

## 2021-12-01 RX ORDER — LACTULOSE 10 G/15ML
45 SOLUTION ORAL 3 TIMES DAILY
Status: DISCONTINUED | OUTPATIENT
Start: 2021-12-01 | End: 2021-12-05 | Stop reason: HOSPADM

## 2021-12-01 RX ORDER — LEVETIRACETAM 500 MG/1
2000 TABLET ORAL 2 TIMES DAILY
Status: DISCONTINUED | OUTPATIENT
Start: 2021-12-01 | End: 2021-12-04

## 2021-12-01 RX ORDER — ACETAMINOPHEN 650 MG/1
650 SUPPOSITORY RECTAL EVERY 6 HOURS PRN
Status: DISCONTINUED | OUTPATIENT
Start: 2021-12-01 | End: 2021-12-05 | Stop reason: HOSPADM

## 2021-12-01 RX ORDER — POTASSIUM CHLORIDE 20 MEQ/1
40 TABLET, EXTENDED RELEASE ORAL ONCE
Status: COMPLETED | OUTPATIENT
Start: 2021-12-01 | End: 2021-12-01

## 2021-12-01 RX ORDER — SUCRALFATE ORAL 1 G/10ML
1 SUSPENSION ORAL
COMMUNITY
Start: 2021-11-19 | End: 2022-01-13 | Stop reason: ALTCHOICE

## 2021-12-01 RX ORDER — CHOLECALCIFEROL (VITAMIN D3) 125 MCG
1000 CAPSULE ORAL 2 TIMES DAILY
Status: DISCONTINUED | OUTPATIENT
Start: 2021-12-01 | End: 2021-12-05 | Stop reason: HOSPADM

## 2021-12-01 RX ORDER — SODIUM CHLORIDE 0.9 % (FLUSH) 0.9 %
5-40 SYRINGE (ML) INJECTION PRN
Status: DISCONTINUED | OUTPATIENT
Start: 2021-12-01 | End: 2021-12-05 | Stop reason: HOSPADM

## 2021-12-01 RX ORDER — POLYETHYLENE GLYCOL 3350 17 G/17G
17 POWDER, FOR SOLUTION ORAL DAILY PRN
Status: DISCONTINUED | OUTPATIENT
Start: 2021-12-01 | End: 2021-12-05 | Stop reason: HOSPADM

## 2021-12-01 RX ORDER — ROPINIROLE 1 MG/1
1 TABLET, FILM COATED ORAL 3 TIMES DAILY
Status: DISCONTINUED | OUTPATIENT
Start: 2021-12-01 | End: 2021-12-05 | Stop reason: HOSPADM

## 2021-12-01 RX ADMIN — PANTOPRAZOLE SODIUM 40 MG: 40 TABLET, DELAYED RELEASE ORAL at 22:10

## 2021-12-01 RX ADMIN — INSULIN LISPRO 1 UNITS: 100 INJECTION, SOLUTION INTRAVENOUS; SUBCUTANEOUS at 22:09

## 2021-12-01 RX ADMIN — ZONISAMIDE 600 MG: 100 CAPSULE ORAL at 22:12

## 2021-12-01 RX ADMIN — LACOSAMIDE 100 MG: 100 TABLET, FILM COATED ORAL at 22:11

## 2021-12-01 RX ADMIN — LACTULOSE 30 G: 20 SOLUTION ORAL at 22:31

## 2021-12-01 RX ADMIN — GABAPENTIN 600 MG: 600 TABLET ORAL at 22:10

## 2021-12-01 RX ADMIN — Medication 1000 MCG: at 22:09

## 2021-12-01 RX ADMIN — LEVETIRACETAM 2000 MG: 500 TABLET, FILM COATED ORAL at 22:10

## 2021-12-01 RX ADMIN — SODIUM CHLORIDE, PRESERVATIVE FREE 10 ML: 5 INJECTION INTRAVENOUS at 22:12

## 2021-12-01 RX ADMIN — POTASSIUM CHLORIDE 40 MEQ: 1500 TABLET, EXTENDED RELEASE ORAL at 22:11

## 2021-12-01 RX ADMIN — ROPINIROLE HYDROCHLORIDE 1 MG: 1 TABLET, FILM COATED ORAL at 22:10

## 2021-12-01 RX ADMIN — INSULIN GLARGINE 50 UNITS: 100 INJECTION, SOLUTION SUBCUTANEOUS at 22:09

## 2021-12-01 RX ADMIN — GABAPENTIN 600 MG: 600 TABLET ORAL at 16:00

## 2021-12-01 RX ADMIN — ROPINIROLE HYDROCHLORIDE 1 MG: 1 TABLET, FILM COATED ORAL at 16:00

## 2021-12-01 RX ADMIN — METOPROLOL TARTRATE 25 MG: 25 TABLET ORAL at 22:11

## 2021-12-01 RX ADMIN — SODIUM CHLORIDE, PRESERVATIVE FREE 10 ML: 5 INJECTION INTRAVENOUS at 12:00

## 2021-12-01 ASSESSMENT — PAIN DESCRIPTION - DESCRIPTORS: DESCRIPTORS: SHARP;BURNING

## 2021-12-01 ASSESSMENT — PAIN SCALES - GENERAL
PAINLEVEL_OUTOF10: 3
PAINLEVEL_OUTOF10: 3

## 2021-12-01 ASSESSMENT — PAIN - FUNCTIONAL ASSESSMENT: PAIN_FUNCTIONAL_ASSESSMENT: PREVENTS OR INTERFERES SOME ACTIVE ACTIVITIES AND ADLS

## 2021-12-01 ASSESSMENT — PAIN DESCRIPTION - FREQUENCY: FREQUENCY: CONTINUOUS

## 2021-12-01 ASSESSMENT — PAIN DESCRIPTION - LOCATION: LOCATION: LEG

## 2021-12-01 ASSESSMENT — PAIN DESCRIPTION - ONSET: ONSET: ON-GOING

## 2021-12-01 ASSESSMENT — PAIN DESCRIPTION - PAIN TYPE: TYPE: NEUROPATHIC PAIN

## 2021-12-01 ASSESSMENT — PAIN DESCRIPTION - PROGRESSION: CLINICAL_PROGRESSION: NOT CHANGED

## 2021-12-01 ASSESSMENT — PAIN DESCRIPTION - ORIENTATION: ORIENTATION: RIGHT;LEFT

## 2021-12-01 NOTE — OP NOTE
[address] [place] [address]                                OPERATIVE REPORT    PATIENT NAME: Kayleigh Stokes                   :        1970  MED REC NO:   [id number]                           ROOM:  ACCOUNT NO:   [de-identified]                           ADMIT DATE: 2021  PROVIDER:     OWEN Hernandez OF PROCEDURE:  2021    ADDENDUM    BLOOD LOSS:  None.         Cammie Pat M.D.    D: 2021 14:12:23       T: 2021 14:39:23     [email or web address]  Job#: [id number]     Doc#: [id number]

## 2021-12-01 NOTE — TELEPHONE ENCOUNTER
I confirmed with [address] [facility] that the bed was still available. Call placed to Temple in EEG dept to confirm that she was aware of the situation. Call placed to Jo Guzman and patient informed that the bed was available and he could head over to the hospital.  Patient stated that he would be there sometime this morning. Message also sent to Dr. Ivett Garcia and Dr. George Montanez through [facility]. LTME reschedule for 12/1421 will be cancelled.

## 2021-12-01 NOTE — H&P
[facility] Neurology   [address] [place] [address]    HISTORY AND PHYSICAL EXAMINATION            Date:   12/1/2021  Patient name:  Sathish Sampson  Date of admission:  12/1/2021 11:04 AM  MRN:   [id number]  Account:  [de-identified]  YOB: 1970  PCP:    Ander Blakely MD  Room:   [address] [place] [address]  Code Status:    Full Code    Chief Complaint:   Medication adverse side effect, seizure disorder direct admit for LTME to wean off Depakote   History Obtained From:   patient, electronic medical record  History of Present Illness: The patient is a 46 y.o.  male who presents with adverse side effect from his Depakote elevated ammonia 150s. Past medical history significant for right frontal subarachnoid cyst status post resection 2012. Following patient's resection he began to have seizure disorder requiring multiple AEDs including Vimpat 200 mg twice daily, Depakote 500 mg twice daily, Keppra 2000 mg twice daily and zonisamide 60 mg at bedtime. In July patient had left heel abscess and right leg cellulitis prolonged hospitalization greater than 1 month with acute rehab following during which his seizures worsen. Patient had hepatic encephalitis with difficulty control ammonia although there was concern for weaning his Depakote as he has been on it for many years. Patient's current AED regimen has been stable for 3 years. In the last month he has began to have breakthrough seizure-like activity described as eyes rolling in the back of his head eyes closing with nausea vomiting and left facial numbness. Patient also has episodes of tremor in his right upper extremity and sometimes can involve his right lower extremity. Patient recently was seen in the outpatient neurology clinic by [facility] who wean him off Depakote with plan for LTM E. Patient's last dose of Depakote was Sunday 3 days ago. Vitals on arrival 129/65, heart rate 75 T-max 98.3.   Plan for basic lab work including CBC, CMP, ammonia and Depakote levels. We will get patient hooked up to LTME and continue to monitor for any seizure-like activity. Past Medical History:     Past Medical History:   Diagnosis Date    Abnormal thyroid biopsy     s/p left hemithyroidectomy 4/6819 - follicular adenoma    Acid reflux     Anemia     resolved - previously seen by Dr. Emani Thompson (due to enlarged spleen)    Anesthesia     seizures with brain surgery due to blood sugar got really high    Bile reflux gastritis     per EGD 11/16/21 - Dr. Adriana Burr - to start Carafate.  Bipolar disorder ([facility]     Blood clot in vein 04/07/2016    Bella Lemons     Cancer [facility]) 04/2019    thyroid    Chest pain     previously seeing [address] [place] [address] cardiologist, now seeing Dr. Breana العلي (LAD bridging on cath 4/2016)    Chronic back pain     Chronic bronchitis ([facility]     Dr. Rdo Ardian Colon polyp 08/30/2016    Depression     seeing Veswalker Beranbe DVT (deep venous thrombosis) ([facility] [phone]    not on Coumadin due to unable to regulate - Dr. Sarah Shah - no etiology found per patient    Esophageal abnormality     nodule     Fatty liver disease, nonalcoholic     U/S 17/5067 [facility]    Furuncle     legs    History of Doppler ultrasound 05/29/2011    No hemodynamically significant carotid stenosis is identified. A thyroid nodule on each side. Dedicated ultrasound of thyroid gland is suggested to further evaluate if clinically indicated.      History of pulmonary embolism 2007    s/p GFF, related to knee surgery    Hx of blood clots     leg and lung    Hyperlipidemia     severely elevated triglycerides    Hypertension     diastolic    Intracranial arachnoid cyst 11/2012    Dr. Jeff Murry drained, complicated with seizures, DKA    Irritable bowel syndrome     Liver disease     Yolande Phoenix - elevated LFT - positive smooth muscle antibody, steatosis per liver bx 3/2014 with Dr. Carmella Abdalla (multiple drug resistant organisms) resistance 2012    MRSA (methicillin resistant staph aureus) culture positive     h/o in foot and before brain surgery    Nephrolithiasis     noted on CT abdomen 9/2016, 6/2019    Neuropathy     Pancreatic insufficiency     Positive SANDY (antinuclear antibody)     Dr. Neri Eddy - first visit in 6/2013    Prolonged emergence from general anesthesia     Restless legs syndrome     Seizures ([address]     started with brain surgery    Sinus tachycardia     Holter 3/2013 - seeing Dr. Eduard Templeton Skull fracture [facility])     clips     Sleep apnea     positive sleep apnea per study 10/2020.  Systolic dysfunction     \"systolic bridge\"  EF 05% ECHO 9/2019    Type II or unspecified type diabetes mellitus without mention of complication, not stated as uncontrolled 2007        Past Surgical History:     Past Surgical History:   Procedure Laterality Date    CARDIAC CATHETERIZATION      2011,5-6 years ago   [address]  3-2012    CARDIAC CATHETERIZATION  04/28/2016    [facility]     CARPAL TUNNEL RELEASE  01/2017    CHOLECYSTECTOMY  2004    COLONOSCOPY  2012    COLONOSCOPY  08/2016    2 tubular adenomas - reportedly needs repeat scope in 6 months    CRANIOTOMY  11/2012    arachnoid cyst drainage    EKG 12-LEAD  10/18/2015         ENDOSCOPY, COLON, DIAGNOSTIC      HERNIA REPAIR Right 1996    [facility]--Dr. Santos Beauty INGUINAL HERNIA REPAIR Right 09/15/2016    Robotic assisted    KNEE ARTHROSCOPY Right 2016    KNEE SURGERY Left 2007    acl and debrided twice    OTHER SURGICAL HISTORY  5-31-11    Tilt table was associated w/ nonspecific symptoms or nausea, otherwise no significant dizziness or syncope. No significant hemodynamic changes. Otherwise, unremarkable tilt table test after 30 minutes of tilting.      OTHER SURGICAL HISTORY  01/20/2017    RIGHT SHOULDER ARTHROSCOPY, OPEN STAN, OPEN ACROMIOPLASTY, BICEP TENDESIS, RIGHT CARPAL TUNNEL RELEASE    SHOULDER SURGERY Right 01/2007    THYROID LOBECTOMY N/A 1/15/2021    THYROID LOBECTOMY, UVULOPALATOPHARYNGOPLAST LAOP performed by Ольга Padilla MD at [facility] ECHOCARDIOGRAM  3-05-11    LV size and systolic function normal. EF 55-65%.  UPPER GASTROINTESTINAL ENDOSCOPY  2012    UPPER GASTROINTESTINAL ENDOSCOPY  2016    Dr. Romie Esposito Left 10/22/2019    EGD DILATION SAVORY performed by Angelika Eduardo MD at [facility] ENDOSCOPY Left 10/22/2019    EGD BIOPSY performed by Angelika Eduardo MD at [facility] ENDOSCOPY N/A 11/16/2021    EGD performed by Angelika Eduardo MD at [facility] ENDOSCOPY Left 11/16/2021    EGD BIOPSY performed by Angelika Eduardo MD at [address]  2007        Medications Prior to Admission:     Prior to Admission medications    Medication Sig Start Date End Date Taking? Authorizing Provider   vitamin B-12 (CYANOCOBALAMIN) 1000 MCG tablet Take 1,000 mcg by mouth 2 times daily    Yes Historical Provider, MD   promethazine (PHENERGAN) 12.5 MG tablet Take 1-2 tablets by mouth every 8 hours as needed for Nausea 11/11/21  Yes Park Conde MD   insulin glargine (BASAGLAR KWIKPEN) 100 UNIT/ML injection pen Inject 50 Units into the skin 2 times daily   Yes Historical Provider, MD   DULoxetine (CYMBALTA) 60 MG extended release capsule Take 120 mg by mouth daily   Yes Historical Provider, MD   metoprolol tartrate (LOPRESSOR) 25 MG tablet Take 1 tablet by mouth 2 times daily 9/17/21  Yes Park Conde MD   gabapentin (NEURONTIN) 600 MG tablet Take 600 mg by mouth 3 times daily.    Yes Historical Provider, MD   levETIRAcetam (KEPPRA) 1000 MG tablet Take 2 tablets by mouth 2 times daily 8/4/21  Yes Gillian Sherwood MD   zonisamide (ZONEGRAN) 100 MG capsule Take 5 capsules by mouth nightly  Patient taking differently: Take 600 mg by mouth nightly  8/4/21  Yes Gillian Sherwood MD   insulin lispro (HUMALOG) 100 UNIT/ML injection vial Takes 10 units with meals plus group 2 sliding scale   Yes Historical Provider, MD   pantoprazole (PROTONIX) 40 MG tablet Take 1 tablet by mouth 2 times daily 3/8/21  Yes Sulma Nunez MD   vitamin D (ERGOCALCIFEROL) 1.25 MG (86444 UT) CAPS capsule Take 1 capsule by mouth once a week  Patient taking differently: Take 50,000 Units by mouth once a week Monday 3/8/21  Yes Sulma Nunez MD   rOPINIRole (REQUIP) 1 MG tablet TAKE 1 TABLET BY MOUTH THREE TIMES A DAY 12/22/20  Yes Sulma Nunez MD   lacosamide (VIMPAT) 100 MG TABS tablet Take 200 mg by mouth 2 times daily. Yes Historical Provider, MD   sucralfate (CARAFATE) 1 GM/10ML suspension Take 1 g by mouth 4 times daily (before meals and nightly) 11/19/21   Historical Provider, MD   divalproex (DEPAKOTE ER) 250 MG extended release tablet Take 250 mg by mouth 2 times daily 11/10/21   Historical Provider, MD   empagliflozin (JARDIANCE) 25 MG tablet Take 25 mg by mouth daily 9/2/21   Sulma Nunez MD   LORazepam (ATIVAN) 0.5 MG tablet Lorazepam Active 0.5 MG PO Twice Daily as needed 0 August 18th, 2021 10:32am 8/18/21   Historical Provider, MD   blood glucose test strips (ONETOUCH ULTRA) strip 1 each by In Vitro route 3 times daily DX: E11.65 Dispense Onetouch Ultra 2 test strips 8/23/21   Sulma Nunez MD   lactulose (CHRONULAC) 10 GM/15ML solution Take 15 mLs by mouth 3 times daily  Patient taking differently: Take 45 mLs by mouth 3 times daily  8/4/21   Jeremi Silvestre MD   sildenafil (VIAGRA) 100 MG tablet Take 1 tablet by mouth as needed for Erectile Dysfunction 1/29/19   Sulma Nunez MD        Allergies:     Ciprofloxacin-ciproflox hcl er, Heparin, Metformin, Niacin and related, Tramadol hcl, Ultram [tramadol], and Warfarin    Social History:     Tobacco:    reports that he has never smoked. He has never used smokeless tobacco.  Alcohol:      reports no history of alcohol use.   Drug Use: reports no history of drug use. Family History:     Family History   Problem Relation Age of Onset    Heart Disease Father 61        MI    Stroke Brother     Obesity Brother     Arthritis Mother     Seizures Mother     Obesity Sister     Other Brother         pulmonary embolism    Diabetes Daughter     Seizures Brother        Review of Systems:     ROS:  Constitutional  Negative for fever and chills    HEENT  Negative for ear discharge, ear pain, nosebleed    Eyes  Negative for photophobia, pain and discharge    Respiratory  Negative for hemoptysis and sputum    Cardiovascular  Negative for orthopnea, claudication and PND    Gastrointestinal  Negative for abdominal pain, diarrhea, blood in stool    Musculoskeletal  Negative for joint pain, negative for myalgia    Neurology  positive for seizure, 4 episodes per week   Skin   positive for petechial rash bilateral lower extremities   Endo/heme/allergies  Negative for polydipsia, environmental allergy    Psychiatric/behavioral  Negative for suicidal ideation.  Patient is not anxious        Physical Exam:   /65   Pulse 75   Temp 98.3 °F (36.8 °C) (Oral)   Resp 16   Ht 6' 2\" (1.88 m)   Wt 266 lb 12.8 oz (121 kg)   SpO2 95%   BMI 34.26 kg/m²   Temp (24hrs), Av.3 °F (36.8 °C), Min:98.3 °F (36.8 °C), Max:98.3 °F (36.8 °C)    Recent Labs     21  1203   POCGLU 176*     No intake or output data in the 24 hours ending 21 1255      NEUROLOGIC EXAMINATION  GENERAL  Appears comfortable and in no distress   HEENT  NC/ AT   NECK  Supple and no bruits heard   MENTAL STATUS:  Alert, oriented, intact memory, no confusion, normal speech, normal language, no hallucination or delusion   CRANIAL NERVES: II     -      Visual fields intact to confrontation  III,IV,VI -  EOMs full, no afferent defect, no KARIS, no ptosis  V     -     Normal facial sensation  VII    -     Normal facial symmetry  VIII   -     Intact hearing  IX,X -     Symmetrical palate  XI -     Symmetrical shoulder shrug  XII   -     Midline tongue, no atrophy    MOTOR FUNCTION:  significant for good strength of grade 5/5 in bilateral proximal and distal muscle groups of both upper and lower extremities with normal bulk, normal tone and no involuntary movements, paroxysmal resting tremor of right upper extremity   SENSORY FUNCTION:  Length dependent diabetic neuropathy involving bilateral lower extremities up to the mid shin. CEREBELLAR FUNCTION:  Intact fine motor control over upper limbs   REFLEX FUNCTION:  Symmetric, no perverted reflex, no Babinski sign   STATION and GAIT  normal gait able to walk unassisted       Investigations:      Laboratory Testing:  Recent Results (from the past 24 hour(s))   POC Glucose Fingerstick    Collection Time: 12/01/21 12:03 PM   Result Value Ref Range    POC Glucose 176 (H) 75 - 110 mg/dL         Assessment :      Primary Problem  Partial seizures [facility])    Active Hospital Problems    Diagnosis Date Noted    Partial seizures ([facility] [address] [R56.9] 11/30/2021       Plan:     Patient status Admit as inpatient in the  Progressive Unit/Step down     80-year-old  gentleman who presents for elective LTM E and medication taper. Patient was taken off Depakote over the last week most recent dosing Sunday 3 days ago. Past medical history significant for right frontal subarachnoid cyst status post resection 2012 with epilepsy disorder following this. Patient has multiple different seizure types including partial complex and generalized tonic-clonic seizures. Has been on study medication regimen for greater than 5 years including Depakote 500 mg twice daily, Keppra to 3 g twice daily, Vimpat 200 mg twice daily, zonisamide 600 mg at bedtime. Recently in June had significant infection bilateral lower extremities requiring prolonged hospitalization and outpatient stay with hepatic encephalopathy.   There is concern that Depakote has been a bad medication with adverse side effect of hyperammonemia and patient's known diagnosis of AGUILAR. Assessment  1. Elective LTME for epilepsy disorder and drug taper  2. History of right frontal subarachnoid cyst status post resection 2012  3. Multiple comorbid conditions including diabetes, Aguilar, hypertension, IBS, sleep apnea, thyroid cancer status post resection    Plan  -Elective LTME to monitor patient recently taken off Depakote  -Follow-up CBC, CMP, ammonia levels and Depakote levels        Consultations:   None      Follow-up further recommendations after discussing the case with attending  The plan was discussed with the patient, patient's family and the medical staff. Patient is admitted as inpatient status because of co-morbidities listed above, severity of signs and symptoms as outlined, requirement for current medical therapies and most importantly because of direct risk to patient if care not provided in a hospital setting.     Jeanine West MD   PGY-3 Neurology Resident   12/1/2021  12:55 PM    Copy sent to Dr. Nancy Montano MD

## 2021-12-02 LAB
ANION GAP SERPL CALCULATED.3IONS-SCNC: 12 MMOL/L (ref 9–17)
BUN BLDV-MCNC: 7 MG/DL (ref 6–20)
BUN/CREAT BLD: ABNORMAL (ref 9–20)
CALCIUM SERPL-MCNC: 7.8 MG/DL (ref 8.6–10.4)
CHLORIDE BLD-SCNC: 109 MMOL/L (ref 98–107)
CO2: 19 MMOL/L (ref 20–31)
CREAT SERPL-MCNC: 0.75 MG/DL (ref 0.7–1.2)
GFR AFRICAN AMERICAN: >60 ML/MIN
GFR NON-AFRICAN AMERICAN: >60 ML/MIN
GFR SERPL CREATININE-BSD FRML MDRD: ABNORMAL ML/MIN/{1.73_M2}
GFR SERPL CREATININE-BSD FRML MDRD: ABNORMAL ML/MIN/{1.73_M2}
GLUCOSE BLD-MCNC: 114 MG/DL (ref 75–110)
GLUCOSE BLD-MCNC: 187 MG/DL (ref 75–110)
GLUCOSE BLD-MCNC: 195 MG/DL (ref 75–110)
GLUCOSE BLD-MCNC: 283 MG/DL (ref 70–99)
GLUCOSE BLD-MCNC: 81 MG/DL (ref 75–110)
POTASSIUM SERPL-SCNC: 3.8 MMOL/L (ref 3.7–5.3)
SODIUM BLD-SCNC: 140 MMOL/L (ref 135–144)

## 2021-12-02 PROCEDURE — 82947 ASSAY GLUCOSE BLOOD QUANT: CPT

## 2021-12-02 PROCEDURE — 36415 COLL VENOUS BLD VENIPUNCTURE: CPT

## 2021-12-02 PROCEDURE — 1200000000 HC SEMI PRIVATE

## 2021-12-02 PROCEDURE — 80048 BASIC METABOLIC PNL TOTAL CA: CPT

## 2021-12-02 PROCEDURE — 2580000003 HC RX 258: Performed by: STUDENT IN AN ORGANIZED HEALTH CARE EDUCATION/TRAINING PROGRAM

## 2021-12-02 PROCEDURE — 99232 SBSQ HOSP IP/OBS MODERATE 35: CPT | Performed by: PSYCHIATRY & NEUROLOGY

## 2021-12-02 PROCEDURE — 95720 EEG PHY/QHP EA INCR W/VEEG: CPT | Performed by: PSYCHIATRY & NEUROLOGY

## 2021-12-02 PROCEDURE — 6370000000 HC RX 637 (ALT 250 FOR IP): Performed by: STUDENT IN AN ORGANIZED HEALTH CARE EDUCATION/TRAINING PROGRAM

## 2021-12-02 RX ORDER — SUCRALFATE 1 G/1
1 TABLET ORAL EVERY 8 HOURS SCHEDULED
Status: DISCONTINUED | OUTPATIENT
Start: 2021-12-02 | End: 2021-12-05 | Stop reason: HOSPADM

## 2021-12-02 RX ORDER — INSULIN GLARGINE 100 [IU]/ML
55 INJECTION, SOLUTION SUBCUTANEOUS 2 TIMES DAILY
Status: DISCONTINUED | OUTPATIENT
Start: 2021-12-02 | End: 2021-12-05 | Stop reason: HOSPADM

## 2021-12-02 RX ORDER — INSULIN GLARGINE 100 [IU]/ML
60 INJECTION, SOLUTION SUBCUTANEOUS 2 TIMES DAILY
Status: DISCONTINUED | OUTPATIENT
Start: 2021-12-02 | End: 2021-12-02

## 2021-12-02 RX ORDER — POTASSIUM CHLORIDE 20 MEQ/1
40 TABLET, EXTENDED RELEASE ORAL ONCE
Status: COMPLETED | OUTPATIENT
Start: 2021-12-02 | End: 2021-12-02

## 2021-12-02 RX ADMIN — ROPINIROLE HYDROCHLORIDE 1 MG: 1 TABLET, FILM COATED ORAL at 14:39

## 2021-12-02 RX ADMIN — LEVETIRACETAM 2000 MG: 500 TABLET, FILM COATED ORAL at 09:23

## 2021-12-02 RX ADMIN — SODIUM CHLORIDE, PRESERVATIVE FREE 10 ML: 5 INJECTION INTRAVENOUS at 20:44

## 2021-12-02 RX ADMIN — ROPINIROLE HYDROCHLORIDE 1 MG: 1 TABLET, FILM COATED ORAL at 20:43

## 2021-12-02 RX ADMIN — INSULIN LISPRO 10 UNITS: 100 INJECTION, SOLUTION INTRAVENOUS; SUBCUTANEOUS at 18:17

## 2021-12-02 RX ADMIN — PANTOPRAZOLE SODIUM 40 MG: 40 TABLET, DELAYED RELEASE ORAL at 20:43

## 2021-12-02 RX ADMIN — LACOSAMIDE 100 MG: 100 TABLET, FILM COATED ORAL at 09:33

## 2021-12-02 RX ADMIN — METOPROLOL TARTRATE 25 MG: 25 TABLET ORAL at 20:48

## 2021-12-02 RX ADMIN — SUCRALFATE 1 G: 1 TABLET ORAL at 15:34

## 2021-12-02 RX ADMIN — INSULIN LISPRO 6 UNITS: 100 INJECTION, SOLUTION INTRAVENOUS; SUBCUTANEOUS at 09:31

## 2021-12-02 RX ADMIN — INSULIN LISPRO 10 UNITS: 100 INJECTION, SOLUTION INTRAVENOUS; SUBCUTANEOUS at 09:32

## 2021-12-02 RX ADMIN — PANTOPRAZOLE SODIUM 40 MG: 40 TABLET, DELAYED RELEASE ORAL at 09:36

## 2021-12-02 RX ADMIN — LEVETIRACETAM 2000 MG: 500 TABLET, FILM COATED ORAL at 20:43

## 2021-12-02 RX ADMIN — METOPROLOL TARTRATE 25 MG: 25 TABLET ORAL at 09:37

## 2021-12-02 RX ADMIN — INSULIN LISPRO 2 UNITS: 100 INJECTION, SOLUTION INTRAVENOUS; SUBCUTANEOUS at 18:17

## 2021-12-02 RX ADMIN — ZONISAMIDE 600 MG: 100 CAPSULE ORAL at 20:45

## 2021-12-02 RX ADMIN — INSULIN GLARGINE 55 UNITS: 100 INJECTION, SOLUTION SUBCUTANEOUS at 20:51

## 2021-12-02 RX ADMIN — GABAPENTIN 600 MG: 600 TABLET ORAL at 20:43

## 2021-12-02 RX ADMIN — Medication 1000 MCG: at 09:34

## 2021-12-02 RX ADMIN — SODIUM CHLORIDE, PRESERVATIVE FREE 10 ML: 5 INJECTION INTRAVENOUS at 09:34

## 2021-12-02 RX ADMIN — Medication 1000 MCG: at 20:48

## 2021-12-02 RX ADMIN — INSULIN GLARGINE 60 UNITS: 100 INJECTION, SOLUTION SUBCUTANEOUS at 09:43

## 2021-12-02 RX ADMIN — ROPINIROLE HYDROCHLORIDE 1 MG: 1 TABLET, FILM COATED ORAL at 09:29

## 2021-12-02 RX ADMIN — POTASSIUM CHLORIDE 40 MEQ: 1500 TABLET, EXTENDED RELEASE ORAL at 09:43

## 2021-12-02 RX ADMIN — DULOXETINE HYDROCHLORIDE 120 MG: 30 CAPSULE, DELAYED RELEASE ORAL at 09:25

## 2021-12-02 RX ADMIN — GABAPENTIN 600 MG: 600 TABLET ORAL at 14:40

## 2021-12-02 RX ADMIN — SUCRALFATE 1 G: 1 TABLET ORAL at 20:48

## 2021-12-02 RX ADMIN — GABAPENTIN 600 MG: 600 TABLET ORAL at 09:27

## 2021-12-02 NOTE — PROCEDURES
LONG-TERM EEG-VIDEO [address]    Patient: Damaso Glover  Age: 46 y.o. MRN: [id number]    Referring Physician: NICANOR Denny*  History: The patient is a 46 y.o. male who presented breakthrough seizure/encephalopathy. This long-term video-EEG monitoring study was performed to determine the nature of the patient's clinical events. The patient is on neuroactive medications.    Damaso Glover   Current Facility-Administered Medications   Medication Dose Route Frequency Provider Last Rate Last Admin    insulin glargine (LANTUS) injection vial 60 Units  60 Units SubCUTAneous BID Samir Mccartney MD   60 Units at 12/02/21 0943    sodium chloride flush 0.9 % injection 5-40 mL  5-40 mL IntraVENous 2 times per day Sheng Hannah MD   10 mL at 12/02/21 0934    sodium chloride flush 0.9 % injection 5-40 mL  5-40 mL IntraVENous PRN Sheng Hannah MD        0.9 % sodium chloride infusion  25 mL IntraVENous PRN Sheng Hannah MD        ondansetron (ZOFRAN-ODT) disintegrating tablet 4 mg  4 mg Oral Q8H PRN Sheng Hannah MD        Or    ondansetron (ZOFRAN) injection 4 mg  4 mg IntraVENous Q6H PRN Sheng Hannah MD        polyethylene glycol (GLYCOLAX) packet 17 g  17 g Oral Daily PRN Sheng Hannah MD        acetaminophen (TYLENOL) tablet 650 mg  650 mg Oral Q6H PRN Sheng Hannah MD        Or    acetaminophen (TYLENOL) suppository 650 mg  650 mg Rectal Q6H PRN Porsche Ramirez MD        LORazepam (ATIVAN) injection 2 mg  2 mg IntraVENous Q4H PRN Porsche Ramirez MD        glucose (GLUTOSE) 40 % oral gel 15 g  15 g Oral PRN Migel Malone MD        dextrose 50 % IV solution  12.5 g IntraVENous PRN Samir Mccartney MD        glucagon (rDNA) injection 1 mg  1 mg IntraMUSCular PRN Migel Malone MD        dextrose 5 % solution  100 mL/hr IntraVENous PRN Migel Malone MD        DULoxetine (CYMBALTA) extended release capsule 120 20-40 uV rhythm which reacted symmetrically to eye opening and had a normal frequency-amplitude gradient with an age-appropriate mixture of frequencies. Continues fast with activity was seen over the right frontal leads consistent with breach rhythm. Occasional left frontal polymorphic 6 to 7 Hz theta slowing was seen. Occasional left posterior temporal sharp waves at T5 were seen. During drowsiness, there were bursts of diffuse slowing and waxing and waning of the posterior dominant rhythm. During stage II sleep symmetric V waves, K complexes, and sleep spindles were seen. The appearance of diffuse delta activity was observed in slow wave sleep. No abnormalities were activated by sleep. The EKG channel revealed no abnormalities. Ictal EEG Recording / Patient Events: During this period the patient had no events or seizures. The patient had a pushbutton events at 5:35 PM, 6:12 PM which both appeared accidental.    Summary: During this day of recording no events were recorded. The interictal EEG was abnormal due to breach rhythm over the right frontal region suggesting underlying skull defect. Occasional polymorphic theta slowing over the left frontal region suggested underlying neuronal dysfunction. Occasional left posterior temporal sharp waves conferred an increased risk for focal onset seizures. Monitoring was continued in order to record the patient's typical events. The EKG channel revealed no abnormalities. Day 2 - 12/2/21, reviewed through 7:30 am    Interictal EEG Samples: Interictal EEG was unchanged from yesterday. Ictal EEG Recording / Patient Events: During this period the patient had no events or seizures. Summary: During this day of recording no events were recorded. The interictal EEG was abnormal due to breach rhythm over the right frontal region suggesting underlying skull defect.   Occasional polymorphic theta slowing over the left frontal region suggested underlying neuronal dysfunction. Occasional left posterior temporal sharp waves conferred an increased risk for focal onset seizures. Monitoring was continued in order to record the patient's typical events. The EKG channel revealed no abnormalities. Lennette Crigler, MD  Diplomate, American Board of Psychiatry and Neurology  Diplomate, American Board of Clinical Neurophysiology  Diplomate, American Board of Epilepsy     Example of left frontal slowing      Example of left posterior temporal sharp waves    Please note this is a preliminary report and updated daily. The final report will have a summary of behavior and electrographic findings with clinical correlation.

## 2021-12-02 NOTE — CARE COORDINATION
Case Management Initial Discharge Plan  Sathish Sampson,             Met with:patient to discuss discharge plans. Information verified: address, contacts, phone number, , insurance Yes  Insurance Provider: Medicare    Emergency Contact/Next of Kin name & number: ex wife Lupe Red [phone]  Who are involved in patient's support system? Ex wife    PCP: Ander Blakely MD  Date of last visit: Nov      Discharge Planning    Living Arrangements:  Spouse/Significant Other, Children, Family Members     Home has 1 stories  1 stairs to climb to get into front door, 0 stairs to climb to reach second floor  Location of bedroom/bathroom in home 1st floor    Patient able to perform ADL's:Independent    Current Services (outpatient & in home) n/a  DME equipment: na  DME provider: na    Is patient receiving oral anticoagulation therapy? No    If indicated:   Physician managing anticoagulation treatment:   Where does patient obtain lab work for ATC treatment? Potential Assistance Needed:  N/A    Patient agreeable to home care: No  [place] of choice provided:  n/a     Evaluation: n/a    Expected Discharge date:  21    Patient expects to be discharged to:   Home with ex wife    If home: is the family and/or caregiver wiling & able to provide support at home? yes  Who will be providing this support? Ex-wife    Follow Up Appointment: Best Day/ Time: Monday AM    Transportation provider: ex wife  Transportation arrangements needed for discharge: No    Readmission Risk              Risk of Unplanned Readmission:  25             Does patient have a readmission risk score greater than 14?: Yes  If yes, follow-up appointment must be made within 7 days of discharge. Goals of Care: safety      Educated patient on transitional options, provided freedom of choice and are agreeable with plan      Discharge Plan: Home independent with ex-wife. Declines further services.           Electronically signed by Clearance JEWEL Johnson on 12/2/21 at 6:18 PM EST

## 2021-12-02 NOTE — PLAN OF CARE
Nutrition Problem #1: Increased nutrient needs  Intervention: Food and/or Nutrient Delivery: Continue Current Diet, Start Oral Nutrition Supplement  Nutritional Goals: PO intake to meet > 75% of estimated nutrition needs

## 2021-12-02 NOTE — PROGRESS NOTES
[facility]   [address] [place] [address]    Progress note             Date:   12/2/2021  Patient name:  Paras Ocasio  Date of admission:  12/1/2021 11:04 AM  MRN:   [id number]  Account:  [de-identified]  YOB: 1970  PCP:    Mauricio German MD  Room:   [address] [place] [address]  Code Status:    Full Code    Chief Complaint:   Medication adverse side effect, seizure disorder direct admit for LTME to wean off Depakote   Interval hx: The Patient was seen and examined at bedside  Hemodynamically stable overnight -129, heart rate 75-78 T-max 98.4 alert oriented x3  No acute events overnight  Patient has severe insulin resistance home dose Lantus 55 units twice daily in addition to Humalog 10 units with medium dose sliding scale before meals 3 times daily. Patient's glucose from  this morning increased his Lantus from 50 units twice daily to 60 units twice daily and if unable to obtain better control will consult medicine later today to assist in blood sugars. Also replacing potassium 40 mEq p.o. given large insulin regimen and potassium of 3.6 which improved to 3.8. Vimpat decreased to 100 mg last night and this morning. Brief History of Present Illness: The patient is a 46 y.o.  male who presents with adverse side effect from his Depakote elevated ammonia 150s. Past medical history significant for right frontal subarachnoid cyst status post resection 2012. Following patient's resection he began to have seizure disorder requiring multiple AEDs including Vimpat 200 mg twice daily, Depakote 500 mg twice daily, Keppra 2000 mg twice daily and zonisamide 60 mg at bedtime. In July patient had left heel abscess and right leg cellulitis prolonged hospitalization greater than 1 month with acute rehab following during which his seizures worsen.   Patient had hepatic encephalitis with difficulty control ammonia although there was concern for weaning his Depakote as he has been on it for many years. Patient's current AED regimen has been stable for 3 years. In the last month he has began to have breakthrough seizure-like activity described as eyes rolling in the back of his head eyes closing with nausea vomiting and left facial numbness. Patient also has episodes of tremor in his right upper extremity and sometimes can involve his right lower extremity. Patient recently was seen in the outpatient neurology clinic by [facility] who wean him off Depakote with plan for LTM E. Patient's last dose of Depakote was Sunday 3 days ago.     Vitals on arrival 129/65, heart rate 75 T-max 98.3. Plan for basic lab work including CBC, CMP, ammonia and Depakote levels. We will get patient hooked up to LTME and continue to monitor for any seizure-like activity. Past Medical History:     Past Medical History:   Diagnosis Date    Abnormal thyroid biopsy     s/p left hemithyroidectomy 3/4952 - follicular adenoma    Acid reflux     Anemia     resolved - previously seen by Dr. Yasmine Pierson (due to enlarged spleen)    Anesthesia     seizures with brain surgery due to blood sugar got really high    Bile reflux gastritis     per EGD 11/16/21 - Dr. Kelvin Fox - to start Carafate.  Bipolar disorder ([address]     Blood clot in vein 04/07/2016    [facility]     Cancer [facility]) 04/2019    thyroid    Chest pain     previously seeing [address] [place] [address] cardiologist, now seeing Dr. Tatiana Heredia (LAD bridging on cath 4/2016)    Chronic back pain     Chronic bronchitis ([facility]     Dr. Roberto Rowell Colon polyp 08/30/2016    Depression     seeing Claudean Hilding DVT (deep venous thrombosis) ([facility] [phone]    not on Coumadin due to unable to regulate - Dr. Sheryle Kelly - no etiology found per patient    Esophageal abnormality     nodule     Fatty liver disease, nonalcoholic     U/S [id number]/7846 [facility]    Furuncle     legs    History of Doppler ultrasound 05/29/2011    No hemodynamically significant carotid stenosis is identified.  A thyroid nodule on each side. Dedicated ultrasound of thyroid gland is suggested to further evaluate if clinically indicated.  History of pulmonary embolism 2007    s/p GFF, related to knee surgery    Hx of blood clots     leg and lung    Hyperlipidemia     severely elevated triglycerides    Hypertension     diastolic    Intracranial arachnoid cyst 11/2012    Dr. Norma Goetz drained, complicated with seizures, DKA    Irritable bowel syndrome     Liver disease     Glenny Fearing - elevated LFT - positive smooth muscle antibody, steatosis per liver bx 3/2014 with Dr. Nir Keen (multiple drug resistant organisms) resistance 2012    MRSA (methicillin resistant staph aureus) culture positive     h/o in foot and before brain surgery    Nephrolithiasis     noted on CT abdomen 9/2016, 6/2019    Neuropathy     Pancreatic insufficiency     Positive SANDY (antinuclear antibody)     Dr. Maged [place] - first visit in 6/2013    Prolonged emergence from general anesthesia     Restless legs syndrome     Seizures ([address]     started with brain surgery    Sinus tachycardia     Holter 3/2013 - seeing Dr. Radha Martínez fracture [facility])     clips     Sleep apnea     positive sleep apnea per study 10/2020.     Systolic dysfunction     \"systolic bridge\"  EF 45% ECHO 9/2019    Type II or unspecified type diabetes mellitus without mention of complication, not stated as uncontrolled 2007        Past Surgical History:     Past Surgical History:   Procedure Laterality Date    CARDIAC CATHETERIZATION      2011,5-6 years ago   [address]  3-2012   [address]  04/28/2016    [facility]     CARPAL TUNNEL RELEASE  01/2017    CHOLECYSTECTOMY  2004    COLONOSCOPY  2012    COLONOSCOPY  08/2016    2 tubular adenomas - reportedly needs repeat scope in 6 months    CRANIOTOMY  11/2012    arachnoid cyst drainage    EKG 12-LEAD  10/18/2015         ENDOSCOPY, COLON, DIAGNOSTIC      HERNIA REPAIR Right 1996    [facility]--Dr. Kate Love  INGUINAL HERNIA REPAIR Right 09/15/2016    Robotic assisted    KNEE ARTHROSCOPY Right 2016    KNEE SURGERY Left 2007    acl and debrided twice    OTHER SURGICAL HISTORY  5-31-11    Tilt table was associated w/ nonspecific symptoms or nausea, otherwise no significant dizziness or syncope. No significant hemodynamic changes. Otherwise, unremarkable tilt table test after 30 minutes of tilting.  OTHER SURGICAL HISTORY  01/20/2017    RIGHT SHOULDER ARTHROSCOPY, OPEN STAN, OPEN ACROMIOPLASTY, BICEP TENDESIS, RIGHT CARPAL TUNNEL RELEASE    SHOULDER SURGERY Right 01/2007    THYROID LOBECTOMY N/A 1/15/2021    THYROID LOBECTOMY, UVULOPALATOPHARYNGOPLAST LAOP performed by Liv Huerta MD at [facility] ECHOCARDIOGRAM  3-05-11    LV size and systolic function normal. EF 55-65%.  UPPER GASTROINTESTINAL ENDOSCOPY  2012    UPPER GASTROINTESTINAL ENDOSCOPY  2016    Dr. Netta Hui Left 10/22/2019    EGD DILATION SAVORY performed by Rosina Arellano MD at [facility] ENDOSCOPY Left 10/22/2019    EGD BIOPSY performed by Rosina Arellano MD at [facility] ENDOSCOPY N/A 11/16/2021    EGD performed by Rosina Arellano MD at [facility] ENDOSCOPY Left 11/16/2021    EGD BIOPSY performed by Rosina Arellano MD at [address]  2007        Medications Prior to Admission:     Prior to Admission medications    Medication Sig Start Date End Date Taking?  Authorizing Provider   vitamin B-12 (CYANOCOBALAMIN) 1000 MCG tablet Take 1,000 mcg by mouth 2 times daily    Yes Historical Provider, MD   promethazine (PHENERGAN) 12.5 MG tablet Take 1-2 tablets by mouth every 8 hours as needed for Nausea 11/11/21  Yes Wojciech Underwood MD   insulin glargine (BASAGLAR KWIKPEN) 100 UNIT/ML injection pen Inject 50 Units into the skin 2 times daily   Yes Historical Provider, MD   DULoxetine (CYMBALTA) 60 MG extended release capsule Take 120 mg by mouth daily   Yes Historical Provider, MD   metoprolol tartrate (LOPRESSOR) 25 MG tablet Take 1 tablet by mouth 2 times daily 9/17/21  Yes Amberly Montgomery MD   gabapentin (NEURONTIN) 600 MG tablet Take 600 mg by mouth 3 times daily. Yes Historical Provider, MD   levETIRAcetam (KEPPRA) 1000 MG tablet Take 2 tablets by mouth 2 times daily 8/4/21  Yes Jenn Beavers MD   zonisamide (ZONEGRAN) 100 MG capsule Take 5 capsules by mouth nightly  Patient taking differently: Take 600 mg by mouth nightly  8/4/21  Yes Jenn Beavers MD   insulin lispro (HUMALOG) 100 UNIT/ML injection vial Takes 10 units with meals plus group 2 sliding scale   Yes Historical Provider, MD   pantoprazole (PROTONIX) 40 MG tablet Take 1 tablet by mouth 2 times daily 3/8/21  Yes Amberly Montgomery MD   vitamin D (ERGOCALCIFEROL) 1.25 MG (05371 UT) CAPS capsule Take 1 capsule by mouth once a week  Patient taking differently: Take 50,000 Units by mouth once a week Monday 3/8/21  Yes Amberly Montgomery MD   rOPINIRole (REQUIP) 1 MG tablet TAKE 1 TABLET BY MOUTH THREE TIMES A DAY 12/22/20  Yes Amberly Montgomery MD   lacosamide (VIMPAT) 100 MG TABS tablet Take 200 mg by mouth 2 times daily.     Yes Historical Provider, MD   sucralfate (CARAFATE) 1 GM/10ML suspension Take 1 g by mouth 4 times daily (before meals and nightly) 11/19/21   Historical Provider, MD   divalproex (DEPAKOTE ER) 250 MG extended release tablet Take 250 mg by mouth 2 times daily 11/10/21   Historical Provider, MD   empagliflozin (JARDIANCE) 25 MG tablet Take 25 mg by mouth daily 9/2/21   Amberly Montgomery MD   LORazepam (ATIVAN) 0.5 MG tablet Lorazepam Active 0.5 MG PO Twice Daily as needed 0 August 18th, 2021 10:32am 8/18/21   Historical Provider, MD   blood glucose test strips (ONETOUCH ULTRA) strip 1 each by In Vitro route 3 times daily DX: E11.65 Dispense Onetouch Ultra 2 test strips 21   Jean Lee MD   lactulose (CHRONULAC) 10 GM/15ML solution Take 15 mLs by mouth 3 times daily  Patient taking differently: Take 45 mLs by mouth 3 times daily  21   Cain Nguyen MD   sildenafil (VIAGRA) 100 MG tablet Take 1 tablet by mouth as needed for Erectile Dysfunction 19   Jean Lee MD        Allergies:     Ciprofloxacin-ciproflox hcl er, Heparin, Metformin, Niacin and related, Tramadol hcl, Ultram [tramadol], and Warfarin    Social History:     Tobacco:    reports that he has never smoked. He has never used smokeless tobacco.  Alcohol:      reports no history of alcohol use. Drug Use:  reports no history of drug use. Family History:     Family History   Problem Relation Age of Onset    Heart Disease Father 61        MI    Stroke Brother     Obesity Brother     Arthritis Mother     Seizures Mother     Obesity Sister     Other Brother         pulmonary embolism    Diabetes Daughter     Seizures Brother        Review of Systems:     ROS:  Constitutional  Negative for fever and chills    HEENT  Negative for ear discharge, ear pain, nosebleed    Eyes  Negative for photophobia, pain and discharge    Respiratory  Negative for hemoptysis and sputum    Cardiovascular  Negative for orthopnea, claudication and PND    Gastrointestinal  Negative for abdominal pain, diarrhea, blood in stool    Musculoskeletal  Negative for joint pain, negative for myalgia    Neurology Negative for seizures, loss of consciousness   Skin  Negative for rash or itching    Endo/heme/allergies  Negative for polydipsia, environmental allergy    Psychiatric/behavioral  Negative for suicidal ideation.  Patient is not anxious        Physical Exam:   /84   Pulse 77   Temp 98.4 °F (36.9 °C) (Oral)   Resp 18   Ht 6' 2\" (1.88 m)   Wt 266 lb 12.8 oz (121 kg)   SpO2 95%   BMI 34.26 kg/m²   Temp (24hrs), Av.4 °F (36.9 °C), Min:98.3 °F (36.8 °C), Max:98.4 °F (36.9 °C)    Recent Labs 12/01/21  1203 12/01/21  1726 12/01/21 2052   POCGLU 176* 100 183*     No intake or output data in the 24 hours ending 12/02/21 0756      NEUROLOGIC EXAMINATION  GENERAL  Appears comfortable and in no distress   HEENT  NC/ AT   NECK  Supple and no bruits heard   MENTAL STATUS:  Alert, oriented, intact memory, no confusion, normal speech, normal language, no hallucination or delusion   CRANIAL NERVES: II     -      Visual fields intact to confrontation  III,IV,VI -  EOMs full, no afferent defect, no KARIS, no ptosis  V     -     Normal facial sensation  VII    -     Normal facial symmetry  VIII   -     Intact hearing  IX,X -     Symmetrical palate  XI    -     Symmetrical shoulder shrug  XII   -     Midline tongue, no atrophy    MOTOR FUNCTION:  significant for good strength of grade 5/5 in bilateral proximal and distal muscle groups of both upper and lower extremities with normal bulk, normal tone and no involuntary movements, no tremor   SENSORY FUNCTION:  Normal touch, normal pin, normal vibration, normal proprioception   CEREBELLAR FUNCTION:  Intact fine motor control over upper limbs   REFLEX FUNCTION:  Symmetric, no perverted reflex, no Babinski sign   STATION and GAIT  Not tested       Investigations:      Laboratory Testing:  Recent Results (from the past 24 hour(s))   POC Glucose Fingerstick    Collection Time: 12/01/21 12:03 PM   Result Value Ref Range    POC Glucose 176 (H) 75 - 110 mg/dL   Comprehensive Metabolic Panel w/ Reflex to MG    Collection Time: 12/01/21  1:31 PM   Result Value Ref Range    Glucose 82 70 - 99 mg/dL    BUN 8 6 - 20 mg/dL    CREATININE 0.59 (L) 0.70 - 1.20 mg/dL    Bun/Cre Ratio NOT REPORTED 9 - 20    Calcium 8.4 (L) 8.6 - 10.4 mg/dL    Sodium 140 135 - 144 mmol/L    Potassium 3.6 (L) 3.7 - 5.3 mmol/L    Chloride 110 (H) 98 - 107 mmol/L    CO2 22 20 - 31 mmol/L    Anion Gap 8 (L) 9 - 17 mmol/L    Alkaline Phosphatase 89 40 - 129 U/L    ALT 24 5 - 41 U/L    AST 16 <40 U/L    Total Bilirubin 0.58 0.3 - 1.2 mg/dL    Total Protein 6.4 6.4 - 8.3 g/dL    Albumin 4.1 3.5 - 5.2 g/dL    Albumin/Globulin Ratio 1.8 1.0 - 2.5    GFR Non-African American >60 >60 mL/min    GFR African American >60 >60 mL/min    GFR Comment          GFR Staging NOT REPORTED    AMMONIA    Collection Time: 12/01/21  1:31 PM   Result Value Ref Range    Ammonia 50 16 - 60 umol/L   Valproic acid level, total and free    Collection Time: 12/01/21  1:31 PM   Result Value Ref Range    Valproic Acid Lvl <3 (L) 50 - 125 ug/mL    Valproic Dose amount NOT REPORTED     Valproic Date last dose NOT REPORTED     Valproic Time last dose NOT REPORTED     Valproic Acid, Free <0.4 (L) 7.0 - 23.0 ug/mL    Valproic Acid % Free Can not be calculated 5.0 - 18.4 %   CBC WITH AUTO DIFFERENTIAL    Collection Time: 12/01/21  1:31 PM   Result Value Ref Range    WBC 7.1 3.5 - 11.3 k/uL    RBC 4.55 4.21 - 5.77 m/uL    Hemoglobin 14.6 13.0 - 17.0 g/dL    Hematocrit 39.2 (L) 40.7 - 50.3 %    MCV 86.2 82.6 - 102.9 fL    MCH 32.1 25.2 - 33.5 pg    MCHC 37.2 (H) 28.4 - 34.8 g/dL    RDW 12.9 11.8 - 14.4 %    Platelets 428 361 - 554 k/uL    MPV 9.7 8.1 - 13.5 fL    NRBC Automated 0.0 0.0 per 100 WBC    Differential Type NOT REPORTED     Seg Neutrophils 54 36 - 65 %    Lymphocytes 35 24 - 43 %    Monocytes 8 3 - 12 %    Eosinophils % 2 1 - 4 %    Basophils 0 0 - 2 %    Immature Granulocytes 1 (H) 0 %    Segs Absolute 3.86 1.50 - 8.10 k/uL    Absolute Lymph # 2.45 1.10 - 3.70 k/uL    Absolute Mono # 0.54 0.10 - 1.20 k/uL    Absolute Eos # 0.15 0.00 - 0.44 k/uL    Basophils Absolute 0.03 0.00 - 0.20 k/uL    Absolute Immature Granulocyte 0.07 0.00 - 0.30 k/uL    WBC Morphology NOT REPORTED     RBC Morphology NOT REPORTED     Platelet Estimate NOT REPORTED    POC Glucose Fingerstick    Collection Time: 12/01/21  5:26 PM   Result Value Ref Range    POC Glucose 100 75 - 110 mg/dL   POC Glucose Fingerstick    Collection Time: 12/01/21  8:52 PM   Result Value Ref Range POC Glucose 183 (H) 75 - 110 mg/dL   BASIC METABOLIC PANEL    Collection Time: 12/02/21  6:28 AM   Result Value Ref Range    Glucose 283 (H) 70 - 99 mg/dL    BUN 7 6 - 20 mg/dL    CREATININE 0.75 0.70 - 1.20 mg/dL    Bun/Cre Ratio NOT REPORTED 9 - 20    Calcium 7.8 (L) 8.6 - 10.4 mg/dL    Sodium 140 135 - 144 mmol/L    Potassium 3.8 3.7 - 5.3 mmol/L    Chloride 109 (H) 98 - 107 mmol/L    CO2 19 (L) 20 - 31 mmol/L    Anion Gap 12 9 - 17 mmol/L    GFR Non-African American >60 >60 mL/min    GFR African American >60 >60 mL/min    GFR Comment          GFR Staging NOT REPORTED          Assessment :      Primary Problem  Partial seizures [facility])    Active Hospital Problems    Diagnosis Date Noted    Partial seizures ([facility] [R56.9] 11/30/2021       Plan:   49-year-old  gentleman who presents for elective LTM E and medication taper. Patient was taken off Depakote over the last week most recent dosing Sunday 3 days ago. Past medical history significant for right frontal subarachnoid cyst status post resection 2012 with epilepsy disorder following this. Patient has multiple different seizure types including partial complex and generalized tonic-clonic seizures. Has been on study medication regimen for greater than 5 years including Depakote 500 mg twice daily, Keppra to 3 g twice daily, Vimpat 200 mg twice daily, zonisamide 600 mg at bedtime. Recently in June had significant infection bilateral lower extremities requiring prolonged hospitalization and outpatient stay with hepatic encephalopathy. There is concern that Depakote has been a bad medication with adverse side effect of hyperammonemia and patient's known diagnosis of AGUILAR.      Assessment  1. Elective LTME for epilepsy disorder and drug taper  2. History of right frontal subarachnoid cyst status post resection 2012  3.   Multiple comorbid conditions including diabetes, Aguilar, hypertension, IBS, sleep apnea, thyroid cancer status post resection     Plan  -Elective LTME to monitor patient recently taken off Depakote  -Increase Lantus from 50 units to 55 units twice daily given patient's sugar went from  this morning  -10 units of Humalog in addition to medium dose sliding scale with meals  -Potassium 3.6 replaced with 40 mEq p.o. improved to 3.8  -Continue home dose Keppra 2 g twice daily and zonisamide 600 mg nightly  -Vimpat 200 mg twice daily decreased to 100mg twice daily at this point we will continue to decrease tonight by holding evening dose. Follow-up further recommendations after discussing the case with attending  The plan was discussed with the patient, patient's family and the medical staff. Consultations:   IP CONSULT TO SOCIAL WORK    Patient is admitted as inpatient status because of co-morbidities listed above, severity of signs and symptoms as outlined, requirement for current medical therapies and most importantly because of direct risk to patient if care not provided in a hospital setting.     Liz Ramirez MD   PGY-3 Neurology Resident   12/2/2021  7:56 AM    Copy sent to Dr. Luis Ponce MD

## 2021-12-02 NOTE — PROGRESS NOTES
Comprehensive Nutrition Assessment    Type and Reason for Visit:  Initial, Positive Nutrition Screen (Wound, Poor Appetite/Intake)    Nutrition Recommendations/Plan:   - Continue Regular Diet with 4 carbohydrate choices per meal  - Start diabetic oral nutrition supplement (Glucerna) and provide 2x/d with Breakfast and Lunch  - Monitor/encourage PO intakes     Nutrition Assessment:  Patient seen for wound and poor appetite/intake. Patient with wound to right lateral leg. Blood glucose range x past 24 hours =  mg/dL. Per EHR, patient's weight has increased x past two months. Last BM noted on 11/30. Will start Ensure ONS to aid in wound healing and due to c/o poor appetite. Malnutrition Assessment:  Malnutrition Status: At risk for malnutrition     Context:  Chronic Illness     Findings of the 6 clinical characteristics of malnutrition:  Energy Intake:  Mild decrease in energy intake   Weight Loss:  No significant weight loss     Body Fat Loss:  No significant body fat loss     Muscle Mass Loss:  No significant muscle mass loss    Fluid Accumulation:  No significant fluid accumulation     Strength:  Not Performed    Estimated Daily Nutrient Needs:  Energy (kcal):  7915-4438 kcal/d (MSJ x 1.15); Weight Used for Energy Requirements:  Admission     Protein (g):  105-130 g protein/d (1.2-1.5 g/kg); Weight Used for Protein Requirements:  Ideal        Fluid (ml/day):  2800 mL fluid/d or per MD; Method Used for Fluid Requirements:   Jas Sanz      Nutrition Related Findings:  Labs reviewed. Meds: Lantus, Humalog, Lactulose, Vitamin B12. Last BM 11/30. +Abdominal distention. Wounds:   Wound to right lateral leg       Current Nutrition Therapies:    ADULT DIET;  Regular; 4 carb choices (60 gm/meal)    Anthropometric Measures:  · Height: 6' 2\" (188 cm)  · Current Body Weight: 266 lb 12.8 oz (121 kg)   · Admission Body Weight: 266 lb 12.8 oz (121 kg)    · Usual Body Weight: 257 lb 3.2 oz (116.7 kg) (9/27/2021)     · Ideal Body Weight: 190 lbs; % Ideal Body Weight 140.4 %   · BMI: 34.2   · BMI Categories: Obese Class 1 (BMI 30.0-34. 9)       Nutrition Diagnosis:   · Increased nutrient needs related to increase demand for energy/nutrients as evidenced by wounds    · Inadequate oral intake related to decreased appetite as evidenced by poor intake prior to admission    Nutrition Interventions:   Food and/or Nutrient Delivery:  Continue Current Diet, Start Oral Nutrition Supplement  Nutrition Education/Counseling:  No recommendation at this time   Coordination of Nutrition Care:  Continue to monitor while inpatient    Goals:  PO intake to meet > 75% of estimated nutrition needs       Nutrition Monitoring and Evaluation:   Behavioral-Environmental Outcomes:  None Identified   Food/Nutrient Intake Outcomes:  Food and Nutrient Intake, Supplement Intake  Physical Signs/Symptoms Outcomes:  Biochemical Data, GI Status, Fluid Status or Edema, Nutrition Focused Physical Findings, Skin, Weight     Discharge Planning:     Too soon to determine     Electronically signed by Tom Rizzo RD, LD on 12/2/21 at 3:23 PM EST    Contact: [phone]

## 2021-12-03 LAB
ANION GAP SERPL CALCULATED.3IONS-SCNC: 7 MMOL/L (ref 9–17)
BUN BLDV-MCNC: 8 MG/DL (ref 6–20)
BUN/CREAT BLD: ABNORMAL (ref 9–20)
CALCIUM SERPL-MCNC: 8.3 MG/DL (ref 8.6–10.4)
CHLORIDE BLD-SCNC: 110 MMOL/L (ref 98–107)
CO2: 20 MMOL/L (ref 20–31)
CREAT SERPL-MCNC: 0.82 MG/DL (ref 0.7–1.2)
GFR AFRICAN AMERICAN: >60 ML/MIN
GFR NON-AFRICAN AMERICAN: >60 ML/MIN
GFR SERPL CREATININE-BSD FRML MDRD: ABNORMAL ML/MIN/{1.73_M2}
GFR SERPL CREATININE-BSD FRML MDRD: ABNORMAL ML/MIN/{1.73_M2}
GLUCOSE BLD-MCNC: 127 MG/DL (ref 70–99)
GLUCOSE BLD-MCNC: 134 MG/DL (ref 75–110)
GLUCOSE BLD-MCNC: 142 MG/DL (ref 75–110)
GLUCOSE BLD-MCNC: 90 MG/DL (ref 75–110)
GLUCOSE BLD-MCNC: 99 MG/DL (ref 75–110)
POTASSIUM SERPL-SCNC: 3.8 MMOL/L (ref 3.7–5.3)
SODIUM BLD-SCNC: 137 MMOL/L (ref 135–144)

## 2021-12-03 PROCEDURE — 36415 COLL VENOUS BLD VENIPUNCTURE: CPT

## 2021-12-03 PROCEDURE — 95720 EEG PHY/QHP EA INCR W/VEEG: CPT | Performed by: PSYCHIATRY & NEUROLOGY

## 2021-12-03 PROCEDURE — 2580000003 HC RX 258: Performed by: STUDENT IN AN ORGANIZED HEALTH CARE EDUCATION/TRAINING PROGRAM

## 2021-12-03 PROCEDURE — 82947 ASSAY GLUCOSE BLOOD QUANT: CPT

## 2021-12-03 PROCEDURE — 99232 SBSQ HOSP IP/OBS MODERATE 35: CPT | Performed by: PSYCHIATRY & NEUROLOGY

## 2021-12-03 PROCEDURE — 6370000000 HC RX 637 (ALT 250 FOR IP): Performed by: STUDENT IN AN ORGANIZED HEALTH CARE EDUCATION/TRAINING PROGRAM

## 2021-12-03 PROCEDURE — 1200000000 HC SEMI PRIVATE

## 2021-12-03 PROCEDURE — 80048 BASIC METABOLIC PNL TOTAL CA: CPT

## 2021-12-03 RX ORDER — POTASSIUM CHLORIDE 20 MEQ/1
40 TABLET, EXTENDED RELEASE ORAL ONCE
Status: COMPLETED | OUTPATIENT
Start: 2021-12-03 | End: 2021-12-03

## 2021-12-03 RX ADMIN — SUCRALFATE 1 G: 1 TABLET ORAL at 06:46

## 2021-12-03 RX ADMIN — ROPINIROLE HYDROCHLORIDE 1 MG: 1 TABLET, FILM COATED ORAL at 09:01

## 2021-12-03 RX ADMIN — SODIUM CHLORIDE, PRESERVATIVE FREE 10 ML: 5 INJECTION INTRAVENOUS at 09:09

## 2021-12-03 RX ADMIN — POTASSIUM CHLORIDE 40 MEQ: 1500 TABLET, EXTENDED RELEASE ORAL at 07:40

## 2021-12-03 RX ADMIN — INSULIN LISPRO 2 UNITS: 100 INJECTION, SOLUTION INTRAVENOUS; SUBCUTANEOUS at 13:11

## 2021-12-03 RX ADMIN — INSULIN LISPRO 10 UNITS: 100 INJECTION, SOLUTION INTRAVENOUS; SUBCUTANEOUS at 13:13

## 2021-12-03 RX ADMIN — METOPROLOL TARTRATE 25 MG: 25 TABLET ORAL at 09:02

## 2021-12-03 RX ADMIN — ZONISAMIDE 600 MG: 100 CAPSULE ORAL at 20:39

## 2021-12-03 RX ADMIN — LACTULOSE 30 G: 20 SOLUTION ORAL at 20:39

## 2021-12-03 RX ADMIN — INSULIN GLARGINE 55 UNITS: 100 INJECTION, SOLUTION SUBCUTANEOUS at 20:40

## 2021-12-03 RX ADMIN — GABAPENTIN 600 MG: 600 TABLET ORAL at 14:30

## 2021-12-03 RX ADMIN — PANTOPRAZOLE SODIUM 40 MG: 40 TABLET, DELAYED RELEASE ORAL at 09:02

## 2021-12-03 RX ADMIN — Medication 1000 MCG: at 20:37

## 2021-12-03 RX ADMIN — ROPINIROLE HYDROCHLORIDE 1 MG: 1 TABLET, FILM COATED ORAL at 20:38

## 2021-12-03 RX ADMIN — INSULIN LISPRO 10 UNITS: 100 INJECTION, SOLUTION INTRAVENOUS; SUBCUTANEOUS at 18:04

## 2021-12-03 RX ADMIN — SUCRALFATE 1 G: 1 TABLET ORAL at 14:29

## 2021-12-03 RX ADMIN — GABAPENTIN 600 MG: 600 TABLET ORAL at 09:03

## 2021-12-03 RX ADMIN — SUCRALFATE 1 G: 1 TABLET ORAL at 20:37

## 2021-12-03 RX ADMIN — INSULIN LISPRO 10 UNITS: 100 INJECTION, SOLUTION INTRAVENOUS; SUBCUTANEOUS at 09:03

## 2021-12-03 RX ADMIN — LEVETIRACETAM 2000 MG: 500 TABLET, FILM COATED ORAL at 09:02

## 2021-12-03 RX ADMIN — INSULIN GLARGINE 55 UNITS: 100 INJECTION, SOLUTION SUBCUTANEOUS at 09:05

## 2021-12-03 RX ADMIN — LACTULOSE 30 G: 20 SOLUTION ORAL at 09:42

## 2021-12-03 RX ADMIN — LACTULOSE 20 G: 20 SOLUTION ORAL at 14:29

## 2021-12-03 RX ADMIN — GABAPENTIN 600 MG: 600 TABLET ORAL at 20:38

## 2021-12-03 RX ADMIN — ROPINIROLE HYDROCHLORIDE 1 MG: 1 TABLET, FILM COATED ORAL at 14:30

## 2021-12-03 RX ADMIN — SODIUM CHLORIDE, PRESERVATIVE FREE 10 ML: 5 INJECTION INTRAVENOUS at 20:40

## 2021-12-03 RX ADMIN — Medication 1000 MCG: at 09:02

## 2021-12-03 RX ADMIN — METOPROLOL TARTRATE 25 MG: 25 TABLET ORAL at 20:38

## 2021-12-03 RX ADMIN — LEVETIRACETAM 2000 MG: 500 TABLET, FILM COATED ORAL at 20:37

## 2021-12-03 RX ADMIN — PANTOPRAZOLE SODIUM 40 MG: 40 TABLET, DELAYED RELEASE ORAL at 20:37

## 2021-12-03 RX ADMIN — DULOXETINE HYDROCHLORIDE 120 MG: 30 CAPSULE, DELAYED RELEASE ORAL at 09:01

## 2021-12-03 ASSESSMENT — PAIN SCALES - GENERAL
PAINLEVEL_OUTOF10: 0
PAINLEVEL_OUTOF10: 0

## 2021-12-03 NOTE — PLAN OF CARE
Problem: Falls - Risk of:  Goal: Will remain free from falls  Description: Will remain free from falls  12/3/2021 0520 by Griselda Ramirez RN  Outcome: Met This Shift  12/2/2021 1530 by Jean Valenzuela RN  Outcome: Ongoing  Goal: Absence of physical injury  Description: Absence of physical injury  12/3/2021 0520 by Griselda Ramirez RN  Outcome: Met This Shift  12/2/2021 1530 by Jena Valenzuela RN  Outcome: Ongoing     Problem: Safety:  Goal: Ability to remain free from injury will improve  Description: Ability to remain free from injury will improve  Outcome: Met This Shift     Problem: Pain:  Goal: Pain level will decrease  Description: Pain level will decrease  12/3/2021 0520 by Griselda Ramirez RN  Outcome: Ongoing  12/2/2021 1530 by Jean Valenzuela RN  Outcome: Ongoing  Goal: Control of acute pain  Description: Control of acute pain  12/3/2021 0520 by Griselda Ramirez RN  Outcome: Ongoing  12/2/2021 1530 by Jean Valenzuela RN  Outcome: Ongoing  Goal: Control of chronic pain  Description: Control of chronic pain  12/3/2021 0520 by Griselda Ramirez RN  Outcome: Ongoing  12/2/2021 1530 by Jean Valenzuela RN  Outcome: Ongoing     Problem: Nutrition  Goal: Optimal nutrition therapy  12/2/2021 1530 by Jean Valenzuela RN  Outcome: Ongoing  12/2/2021 1525 by Rod Aponte RD, LD  Note: Nutrition Problem #1: Increased nutrient needs  Intervention: Food and/or Nutrient Delivery: Continue Current Diet, Start Oral Nutrition Supplement  Nutritional Goals: PO intake to meet > 75% of estimated nutrition needs       Problem: Coping:  Goal: Ability to cope will improve  Description: Ability to cope will improve  Outcome: Ongoing  Goal: Ability to identify appropriate support needs will improve  Description: Ability to identify appropriate support needs will improve  Outcome: Ongoing     Problem: Health Behavior:  Goal: Ability to manage health-related needs will improve  Description: Ability to manage health-related needs will improve  Outcome: Ongoing     Problem: Physical Regulation:  Goal: Signs of adequate cerebral perfusion will increase  Description: Signs of adequate cerebral perfusion will increase  Outcome: Ongoing  Goal: Ability to maintain a stable neurologic state will improve  Description: Ability to maintain a stable neurologic state will improve  Outcome: Ongoing     Problem: Self-Concept:  Goal: Level of anxiety will decrease  Description: Level of anxiety will decrease  Outcome: Ongoing  Goal: Ability to verbalize feelings about condition will improve  Description: Ability to verbalize feelings about condition will improve  Outcome: Ongoing

## 2021-12-03 NOTE — PROGRESS NOTES
[facility] Neurology   [address] [place] [address]    Progress note             Date:   12/3/2021  Patient name:  Linsey Campos  Date of admission:  12/1/2021 11:04 AM  MRN:   [id number]  Account:  [de-identified]  YOB: 1970  PCP:    Minoo Hernández MD  Room:   6966/4917-44  Code Status:    Full Code    Chief Complaint:   Medication adverse side effect, seizure disorder direct admit for LTME to wean off Depakote   Interval hx: The Patient was seen and examined at bedside  Hemodynamically stable overnight -124, heart rate 66-74 T-max 98.6. Alert and oriented x3  Patient neurologically stable denies any new seizure like activity or tremor. Blood glucose improved to 127 this morning. Replacing additional 40meq PO potassium and continue to monitor for any breakthrough provoked seizure while tapering home medications. Will decrease keppra to 1000mg this evening   Brief History of Present Illness:    The patient is a 54 y.o.   male who presents with adverse side effect from his Depakote elevated ammonia 150s.  Past medical history significant for right frontal subarachnoid cyst status post resection 2012.  Following patient's resection he began to have seizure disorder requiring multiple AEDs including Vimpat 200 mg twice daily, Depakote 500 mg twice daily, Keppra 2000 mg twice daily and zonisamide 60 mg at bedtime.  In July patient had left heel abscess and right leg cellulitis prolonged hospitalization greater than 1 month with acute rehab following during which his seizures worsen.  Patient had hepatic encephalitis with difficulty control ammonia although there was concern for weaning his Depakote as he has been on it for many years.  Patient's current AED regimen has been stable for 3 years. Arminda Lopez the last month he has began to have breakthrough seizure-like activity described as eyes rolling in the back of his head eyes closing with nausea vomiting and left facial numbness.  Patient also has episodes of tremor in his right upper extremity and sometimes can involve his right lower extremity.  Patient recently was seen in the outpatient neurology clinic by Yvonne Suggs who wean him off Depakote with plan for LTM E.  Patient's last dose of Depakote was Sunday 3 days ago.     Vitals on arrival 129/65, heart rate 75 T-max 98. 3.  Plan for basic lab work including CBC, CMP, ammonia and Depakote levels.  We will get patient hooked up to [address] [place] [address],[address] continue to monitor for any seizure-like activity. Past Medical History:     Past Medical History:   Diagnosis Date    Abnormal thyroid biopsy     s/p left hemithyroidectomy 4/6312 - follicular adenoma    Acid reflux     Anemia     resolved - previously seen by Dr. Yissel Sarah (due to enlarged spleen)    Anesthesia     seizures with brain surgery due to blood sugar got really high    Bile reflux gastritis     per EGD 11/16/21 - Dr. Pebbles Grullon - to start Carafate.  Bipolar disorder ([facility]     Blood clot in vein 04/07/2016    [facility]     Cancer [facility]) 04/2019    thyroid    Chest pain     previously seeing [address] [place] [address] cardiologist, now seeing Dr. Narciso Reyes (LAD bridging on cath 4/2016)    Chronic back pain     Chronic bronchitis ([facility]     Dr. Corie Dillard Colon polyp 08/30/2016    Depression     seeing Anna House DVT (deep venous thrombosis) ([facility] [phone]    not on Coumadin due to unable to regulate - Dr. Renato Weems - no etiology found per patient    Esophageal abnormality     nodule     Fatty liver disease, nonalcoholic     [facility]    [facility]     [facility]s    History of Doppler ultrasound 05/29/2011    No hemodynamically significant carotid stenosis is identified. A thyroid nodule on each side. Dedicated ultrasound of thyroid gland is suggested to further evaluate if clinically indicated.      History of pulmonary embolism 2007    s/p GFF, related to knee surgery    Hx of blood clots     leg and lung    Hyperlipidemia     severely elevated triglycerides    Hypertension     diastolic    Intracranial arachnoid cyst 11/2012    Dr. Wayne Martínez drained, complicated with seizures, DKA    Irritable bowel syndrome     Liver disease     Junaid Spencer - elevated LFT - positive smooth muscle antibody, steatosis per liver bx 3/2014 with Dr. Layton Talley (multiple drug resistant organisms) resistance 2012    MRSA (methicillin resistant staph aureus) culture positive     h/o in foot and before brain surgery    Nephrolithiasis     noted on CT abdomen 9/2016, 6/2019    Neuropathy     Pancreatic insufficiency     Positive SANDY (antinuclear antibody)     Dr. Veronica Jiang - first visit in 6/2013    Prolonged emergence from general anesthesia     Restless legs syndrome     Seizures ([address]     started with brain surgery    Sinus tachycardia     Holter 3/2013 - seeing Dr. Carmen Argueta Skull fracture [facility])     clips     Sleep apnea     positive sleep apnea per study 10/2020.     Systolic dysfunction     \"systolic bridge\"  EF 96% ECHO 9/2019    Type II or unspecified type diabetes mellitus without mention of complication, not stated as uncontrolled 2007        Past Surgical History:     Past Surgical History:   Procedure Laterality Date    CARDIAC CATHETERIZATION      2011,5-6 years ago   [address]  3-2012    CARDIAC CATHETERIZATION  04/28/2016    [facility]     CARPAL TUNNEL RELEASE  01/2017    CHOLECYSTECTOMY  2004    COLONOSCOPY  2012    COLONOSCOPY  08/2016    2 tubular adenomas - reportedly needs repeat scope in 6 months    CRANIOTOMY  11/2012    arachnoid cyst drainage    EKG 12-LEAD  10/18/2015         ENDOSCOPY, COLON, DIAGNOSTIC      HERNIA REPAIR Right 1996    [facility]--Dr. Kris Molina INGUINAL HERNIA REPAIR Right 09/15/2016    Robotic assisted    KNEE ARTHROSCOPY Right 2016    KNEE SURGERY Left 2007    acl and debrided twice    OTHER SURGICAL HISTORY  5-31-11    Tilt table was associated w/ nonspecific symptoms or nausea, otherwise no significant dizziness or syncope. No significant hemodynamic changes. Otherwise, unremarkable tilt table test after 30 minutes of tilting.  OTHER SURGICAL HISTORY  01/20/2017    RIGHT SHOULDER ARTHROSCOPY, OPEN STAN, OPEN ACROMIOPLASTY, BICEP TENDESIS, RIGHT CARPAL TUNNEL RELEASE    SHOULDER SURGERY Right 01/2007    THYROID LOBECTOMY N/A 1/15/2021    THYROID LOBECTOMY, UVULOPALATOPHARYNGOPLAST LAOP performed by Scharlene Gitelman, MD at [facility] ECHOCARDIOGRAM  3-05-11    LV size and systolic function normal. EF 55-65%.  UPPER GASTROINTESTINAL ENDOSCOPY  2012    UPPER GASTROINTESTINAL ENDOSCOPY  2016    Dr. Gisela Flowers Left 10/22/2019    EGD DILATION SAVORY performed by Dawna Schmidt MD at [facility] ENDOSCOPY Left 10/22/2019    EGD BIOPSY performed by Dawna Schmidt MD at [facility] ENDOSCOPY N/A 11/16/2021    EGD performed by Dawna Schmidt MD at [facility] ENDOSCOPY Left 11/16/2021    EGD BIOPSY performed by Dawna Schmidt MD at [address]  2007        Medications Prior to Admission:     Prior to Admission medications    Medication Sig Start Date End Date Taking?  Authorizing Provider   vitamin B-12 (CYANOCOBALAMIN) 1000 MCG tablet Take 1,000 mcg by mouth 2 times daily    Yes Historical Provider, MD   promethazine (PHENERGAN) 12.5 MG tablet Take 1-2 tablets by mouth every 8 hours as needed for Nausea 11/11/21  Yes Dillon Bocanegra MD   insulin glargine (BASAGLAR KWIKPEN) 100 UNIT/ML injection pen Inject 50 Units into the skin 2 times daily   Yes Historical Provider, MD   DULoxetine (CYMBALTA) 60 MG extended release capsule Take 120 mg by mouth daily   Yes Historical Provider, MD   metoprolol tartrate (LOPRESSOR) 25 MG tablet Take 1 tablet by mouth 2 times daily 9/17/21  Yes Dillon Bocanegra MD gabapentin (NEURONTIN) 600 MG tablet Take 600 mg by mouth 3 times daily. Yes Historical Provider, MD   levETIRAcetam (KEPPRA) 1000 MG tablet Take 2 tablets by mouth 2 times daily 8/4/21  Yes Gillian Sherwood MD   zonisamide (ZONEGRAN) 100 MG capsule Take 5 capsules by mouth nightly  Patient taking differently: Take 600 mg by mouth nightly  8/4/21  Yes Gillian Sherwood MD   insulin lispro (HUMALOG) 100 UNIT/ML injection vial Takes 10 units with meals plus group 2 sliding scale   Yes Historical Provider, MD   pantoprazole (PROTONIX) 40 MG tablet Take 1 tablet by mouth 2 times daily 3/8/21  Yes Park Conde MD   vitamin D (ERGOCALCIFEROL) 1.25 MG (60056 UT) CAPS capsule Take 1 capsule by mouth once a week  Patient taking differently: Take 50,000 Units by mouth once a week Monday 3/8/21  Yes Park Conde MD   rOPINIRole (REQUIP) 1 MG tablet TAKE 1 TABLET BY MOUTH THREE TIMES A DAY 12/22/20  Yes Park Conde MD   lacosamide (VIMPAT) 100 MG TABS tablet Take 200 mg by mouth 2 times daily.     Yes Historical Provider, MD   sucralfate (CARAFATE) 1 GM/10ML suspension Take 1 g by mouth 4 times daily (before meals and nightly) 11/19/21   Historical Provider, MD   divalproex (DEPAKOTE ER) 250 MG extended release tablet Take 250 mg by mouth 2 times daily 11/10/21   Historical Provider, MD   empagliflozin (JARDIANCE) 25 MG tablet Take 25 mg by mouth daily 9/2/21   Park Conde MD   LORazepam (ATIVAN) 0.5 MG tablet Lorazepam Active 0.5 MG PO Twice Daily as needed 0 August 18th, 2021 10:32am 8/18/21   Historical Provider, MD   blood glucose test strips (ONETOUCH ULTRA) strip 1 each by In Vitro route 3 times daily DX: E11.65 Dispense Onetouch Ultra 2 test strips 8/23/21   Park Conde MD   lactulose (CHRONULAC) 10 GM/15ML solution Take 15 mLs by mouth 3 times daily  Patient taking differently: Take 45 mLs by mouth 3 times daily  8/4/21   Gillian Sherwood MD   sildenafil (VIAGRA) 100 MG tablet Take 1 tablet by mouth as needed for Erectile Dysfunction 19   Humberto Armenta MD        Allergies:     Ciprofloxacin-ciproflox hcl er, Heparin, Metformin, Niacin and related, Tramadol hcl, Ultram [tramadol], and Warfarin    Social History:     Tobacco:    reports that he has never smoked. He has never used smokeless tobacco.  Alcohol:      reports no history of alcohol use. Drug Use:  reports no history of drug use. Family History:     Family History   Problem Relation Age of Onset    Heart Disease Father 61        MI    Stroke Brother     Obesity Brother     Arthritis Mother     Seizures Mother     Obesity Sister     Other Brother         pulmonary embolism    Diabetes Daughter     Seizures Brother        Review of Systems:     ROS:  Constitutional  Negative for fever and chills    HEENT  Negative for ear discharge, ear pain, nosebleed    Eyes  Negative for photophobia, pain and discharge    Respiratory  Negative for hemoptysis and sputum    Cardiovascular  Negative for orthopnea, claudication and PND    Gastrointestinal  Negative for abdominal pain, diarrhea, blood in stool    Musculoskeletal  Negative for joint pain, negative for myalgia    Neurology Negative for seizures, loss of consciousness   Skin  Negative for rash or itching    Endo/heme/allergies  Negative for polydipsia, environmental allergy    Psychiatric/behavioral  Negative for suicidal ideation.  Patient is not anxious        Physical Exam:   /77   Pulse 74   Temp 98.6 °F (37 °C) (Oral)   Resp 16   Ht 6' 2\" (1.88 m)   Wt 266 lb 12.8 oz (121 kg)   SpO2 95%   BMI 34.26 kg/m²   Temp (24hrs), Av.4 °F (36.9 °C), Min:98.2 °F (36.8 °C), Max:98.6 °F (37 °C)    Recent Labs     21  0822 21  1207 21  1800 21  2041   POCGLU 195* 81 187* 114*       Intake/Output Summary (Last 24 hours) at 12/3/2021 0708  Last data filed at 2021 1614  Gross per 24 hour   Intake 1320 ml   Output --   Net 1320 ml         NEUROLOGIC EXAMINATION  GENERAL  Appears comfortable and in no distress   HEENT  NC/ AT   NECK  Supple and no bruits heard   MENTAL STATUS:  Alert, oriented, intact memory, no confusion, normal speech, normal language, no hallucination or delusion   CRANIAL NERVES: II     -      Visual fields intact to confrontation  III,IV,VI -  EOMs full, no afferent defect, no KARIS, no ptosis  V     -     Normal facial sensation  VII    -     Normal facial symmetry  VIII   -     Intact hearing  IX,X -     Symmetrical palate  XI    -     Symmetrical shoulder shrug  XII   -     Midline tongue, no atrophy    MOTOR FUNCTION:  significant for good strength of grade 5/5 in bilateral proximal and distal muscle groups of both upper and lower extremities with normal bulk, normal tone and no involuntary movements, no tremor   SENSORY FUNCTION:  Normal touch, normal pin, normal vibration, normal proprioception   CEREBELLAR FUNCTION:  Intact fine motor control over upper limbs   REFLEX FUNCTION:  Symmetric, no perverted reflex, no Babinski sign   STATION and GAIT  Not tested       Investigations:      Laboratory Testing:  Recent Results (from the past 24 hour(s))   POC Glucose Fingerstick    Collection Time: 12/02/21  8:22 AM   Result Value Ref Range    POC Glucose 195 (H) 75 - 110 mg/dL   POC Glucose Fingerstick    Collection Time: 12/02/21 12:07 PM   Result Value Ref Range    POC Glucose 81 75 - 110 mg/dL   POC Glucose Fingerstick    Collection Time: 12/02/21  6:00 PM   Result Value Ref Range    POC Glucose 187 (H) 75 - 110 mg/dL   POC Glucose Fingerstick    Collection Time: 12/02/21  8:41 PM   Result Value Ref Range    POC Glucose 114 (H) 75 - 110 mg/dL   Basic Metabolic Panel w/ Reflex to MG    Collection Time: 12/03/21  5:00 AM   Result Value Ref Range    Glucose 127 (H) 70 - 99 mg/dL    BUN 8 6 - 20 mg/dL    CREATININE 0.82 0.70 - 1.20 mg/dL    Bun/Cre Ratio NOT REPORTED 9 - 20    Calcium 8.3 (L) 8.6 - 10.4 mg/dL    Sodium 137 135 - 144 mmol/L Potassium 3.8 3.7 - 5.3 mmol/L    Chloride 110 (H) 98 - 107 mmol/L    CO2 20 20 - 31 mmol/L    Anion Gap 7 (L) 9 - 17 mmol/L    GFR Non-African American >60 >60 mL/min    GFR African American >60 >60 mL/min    GFR Comment          GFR Staging NOT REPORTED          Assessment :      Primary Problem  Partial seizures [facility])    Active Hospital Problems    Diagnosis Date Noted    Partial seizures ([facility] [R56.9] 11/30/2021       Plan:   51-year-old  gentleman who presents for elective LTME and medication taper.  Patient was taken off Depakote over the last week most recent dosing Sunday 3 days ago.  Past medical history significant for right frontal subarachnoid cyst status post resection 2012 with epilepsy disorder following this.  Patient has multiple different seizure types including partial complex and generalized tonic-clonic seizures.  Has been on steady medication regimen for greater than 5 years including Depakote 500 mg twice daily, Keppra to 2 g twice daily, Vimpat 200 mg twice daily, zonisamide 600 mg at bedtime.  Recently in June had significant infection bilateral lower extremities requiring prolonged hospitalization and outpatient stay with hepatic encephalopathy. Genny Kapoor is concern that Depakote has been a bad medication with adverse side effect of hyperammonemia and patient's known diagnosis of AGUILAR.      Assessment  1.  Elective LTME for epilepsy disorder and drug taper  2.  History of right frontal subarachnoid cyst status post resection 2012  3.  Multiple comorbid conditions including diabetes, Aguilar, hypertension, IBS, sleep apnea, thyroid cancer status post resection     Plan  -10 units of Humalog in addition to medium dose sliding scale with meals  -Potassium 3.6 replaced with 40 mEq p.o. improved to 3.8 patient likely will benefit from daily oral K replacement 20meq given high doses of insulin will continue to monitor and replacing additional 40meq today  -Decrease home Keppra dose from 2 g to 1000 mg this evening and continue zonisamide 600 mg nightly  -Vimpat 200 mg home dose currently being held    LTME monitoring started 12/1/2021 at 12:54  Day 1-breach rhythm over right frontal region, occasional polymorphic theta slowing over left frontal suggestive of underlying neuronal dysfunction, occasional left posterior temporal sharp waves conferred increased risk for focal onset seizures. Patient did have 1 pushbutton which was nonepileptiform with left arm shaking/tremor  Day 2-unchanged from day 1 without any new events vimpat discontinued 12/2 evening   Day 3- unchanged keppra evening dose decreased to 1000mg         Follow-up further recommendations after discussing the case with attending  The plan was discussed with the patient, patient's family and the medical staff. Consultations:   IP CONSULT TO SOCIAL WORK    Patient is admitted as inpatient status because of co-morbidities listed above, severity of signs and symptoms as outlined, requirement for current medical therapies and most importantly because of direct risk to patient if care not provided in a hospital setting.     Cassi Barrera MD   PGY-3 Neurology Resident   12/3/2021  7:08 AM    Copy sent to Dr. Petty Alexander MD

## 2021-12-03 NOTE — PROCEDURES
LONG-TERM EEG-VIDEO [id number] [address]    Patient: Dillon Byers  Age: 46 y.o. MRN: [id number]    Referring Physician: NICANOR Bennett*  History: The patient is a 46 y.o. male who presented breakthrough seizure/encephalopathy. This long-term video-EEG monitoring study was performed to determine the nature of the patient's clinical events. The patient is on neuroactive medications.    Dillon Byers   Current Facility-Administered Medications   Medication Dose Route Frequency Provider Last Rate Last Admin    sucralfate (CARAFATE) tablet 1 g  1 g Oral 3 times per day Real Cuevas MD   1 g at 12/03/21 0646    insulin glargine (LANTUS) injection vial 55 Units  55 Units SubCUTAneous BID Real Cuevas MD   55 Units at 12/03/21 0905    sodium chloride flush 0.9 % injection 5-40 mL  5-40 mL IntraVENous 2 times per day Viktor Richardson MD   10 mL at 12/03/21 1222    sodium chloride flush 0.9 % injection 5-40 mL  5-40 mL IntraVENous PRN Viktor Richardson MD        0.9 % sodium chloride infusion  25 mL IntraVENous PRN Viktor Richardson MD        ondansetron (ZOFRAN-ODT) disintegrating tablet 4 mg  4 mg Oral Q8H PRN Viktor Richardson MD        Or    ondansetron (ZOFRAN) injection 4 mg  4 mg IntraVENous Q6H PRN Porsche Macias MD        polyethylene glycol (GLYCOLAX) packet 17 g  17 g Oral Daily PRN Viktor Richardson MD        acetaminophen (TYLENOL) tablet 650 mg  650 mg Oral Q6H PRN Viktor Richardson MD        Or    acetaminophen (TYLENOL) suppository 650 mg  650 mg Rectal Q6H PRN Porsche Macias MD        LORazepam (ATIVAN) injection 2 mg  2 mg IntraVENous Q4H PRN Viktor Richardson MD        glucose (GLUTOSE) 40 % oral gel 15 g  15 g Oral PRN Migel Malone MD        dextrose 50 % IV solution  12.5 g IntraVENous PRN Migel Broussard MD        glucagon (rDNA) injection 1 mg  1 mg IntraMUSCular PRN Migel Malone MD        dextrose 5 % solution 100 mL/hr IntraVENous PRN Panda Mayfield MD        DULoxetine (CYMBALTA) extended release capsule 120 mg  120 mg Oral Daily Migel Malone MD   120 mg at 12/03/21 0901    empagliflozin (JARDIANCE) tablet TABS 25 mg  25 mg Oral Daily Migel Malone MD        gabapentin (NEURONTIN) tablet 600 mg  600 mg Oral TID Panda Mayfield MD   600 mg at 12/03/21 0903    lactulose (CHRONULAC) 10 GM/15ML solution 30 g  45 mL Oral TID Migel Malone MD   30 g at 12/01/21 2231    levETIRAcetam (KEPPRA) tablet 2,000 mg  2,000 mg Oral BID Migel Malone MD   2,000 mg at 12/03/21 0902    metoprolol tartrate (LOPRESSOR) tablet 25 mg  25 mg Oral BID Migel Malone MD   25 mg at 12/03/21 0902    pantoprazole (PROTONIX) tablet 40 mg  40 mg Oral BID Migel Malone MD   40 mg at 12/03/21 0902    rOPINIRole (REQUIP) tablet 1 mg  1 mg Oral TID Migel Malone MD   1 mg at 12/03/21 0901    vitamin B-12 (CYANOCOBALAMIN) tablet 1,000 mcg  1,000 mcg Oral BID Migel Malone MD   1,000 mcg at 12/03/21 0902    zonisamide (ZONEGRAN) capsule 600 mg  600 mg Oral Nightly Migel Malone MD   600 mg at 12/02/21 2045    insulin lispro (HUMALOG) injection vial 10 Units  10 Units SubCUTAneous TID  Migel Malone MD   10 Units at 12/03/21 0903    insulin lispro (HUMALOG) injection vial 0-12 Units  0-12 Units SubCUTAneous TID  Panda Mayfield MD   2 Units at 12/02/21 1817    insulin lispro (HUMALOG) injection vial 0-6 Units  0-6 Units SubCUTAneous Nightly Panda Mayfield MD   1 Units at 12/01/21 2209     Technical Description: This is a 21-channel digital EEG recording with time-locked video. Electrodes were placed in accordance with the 10-20 International System of Electrode Placement. Single lead EKG monitoring was included. Baseline EEG Recording:  A formal baseline EEG recording was not obtained. Day 1 - 12/1/21, starting at 12:54    Interictal EEG Samples:   In the alert state, the posterior background rhythm was a symmetric, well-modulated, 9-10 Hz, 20-40 uV rhythm which reacted symmetrically to eye opening and had a normal frequency-amplitude gradient with an age-appropriate mixture of frequencies. Continues fast with activity was seen over the right frontal leads consistent with breach rhythm. Occasional left frontal polymorphic 6 to 7 Hz theta slowing was seen. Occasional left posterior temporal sharp waves at T5 were seen. During drowsiness, there were bursts of diffuse slowing and waxing and waning of the posterior dominant rhythm. During stage II sleep symmetric V waves, K complexes, and sleep spindles were seen. The appearance of diffuse delta activity was observed in slow wave sleep. No abnormalities were activated by sleep. The EKG channel revealed no abnormalities. Ictal EEG Recording / Patient Events: During this period the patient had no events or seizures. The patient had a pushbutton events at 5:35 PM, 6:12 PM which both appeared accidental.    Summary: During this day of recording no events were recorded. The interictal EEG was abnormal due to breach rhythm over the right frontal region suggesting underlying skull defect. Occasional polymorphic theta slowing over the left frontal region suggested underlying neuronal dysfunction. Occasional left posterior temporal sharp waves conferred an increased risk for focal onset seizures. Monitoring was continued in order to record the patient's typical events. The EKG channel revealed no abnormalities. Day 2 - 12/2/21    Interictal EEG Samples: Interictal EEG was unchanged from yesterday. Ictal EEG Recording / Patient Events: During this period the patient had no events or seizures. Summary: During this day of recording no events were recorded. The interictal EEG was abnormal due to breach rhythm over the right frontal region suggesting underlying skull defect. Occasional polymorphic theta slowing over the left frontal region suggested underlying neuronal dysfunction.   Occasional left posterior temporal sharp waves conferred an increased risk for focal onset seizures. Monitoring was continued in order to record the patient's typical events. The EKG channel revealed no abnormalities. Day 3 - 12/3/21, reviewed through 7:30 am    Interictal EEG Samples: Interictal EEG was unchanged from yesterday. Previously seen left frontal sharp waves were not seen on today's recording. Ictal EEG Recording / Patient Events: During this period the patient had no events or seizures. Summary: During this day of recording no events were recorded. The interictal EEG was abnormal due to breach rhythm over the right frontal region suggesting underlying skull defect. Occasional polymorphic theta slowing over the left frontal region suggested underlying neuronal dysfunction. No abnormal epileptiform activity was seen on today's recording. Monitoring was continued in order to record the patient's typical events. The EKG channel revealed no abnormalities. Clifton Crump MD  Diplomate, American Board of Psychiatry and Neurology  Diplomate, American Board of Clinical Neurophysiology  Diplomate, American Board of Epilepsy     Example of left frontal slowing      Example of left posterior temporal sharp waves    Please note this is a preliminary report and updated daily. The final report will have a summary of behavior and electrographic findings with clinical correlation.

## 2021-12-04 LAB
ANION GAP SERPL CALCULATED.3IONS-SCNC: 8 MMOL/L (ref 9–17)
BUN BLDV-MCNC: 8 MG/DL (ref 6–20)
BUN/CREAT BLD: ABNORMAL (ref 9–20)
CALCIUM SERPL-MCNC: 8.6 MG/DL (ref 8.6–10.4)
CHLORIDE BLD-SCNC: 107 MMOL/L (ref 98–107)
CO2: 24 MMOL/L (ref 20–31)
CREAT SERPL-MCNC: 0.83 MG/DL (ref 0.7–1.2)
GFR AFRICAN AMERICAN: >60 ML/MIN
GFR NON-AFRICAN AMERICAN: >60 ML/MIN
GFR SERPL CREATININE-BSD FRML MDRD: ABNORMAL ML/MIN/{1.73_M2}
GFR SERPL CREATININE-BSD FRML MDRD: ABNORMAL ML/MIN/{1.73_M2}
GLUCOSE BLD-MCNC: 101 MG/DL (ref 70–99)
GLUCOSE BLD-MCNC: 104 MG/DL (ref 75–110)
GLUCOSE BLD-MCNC: 116 MG/DL (ref 75–110)
GLUCOSE BLD-MCNC: 141 MG/DL (ref 75–110)
GLUCOSE BLD-MCNC: 148 MG/DL (ref 75–110)
GLUCOSE BLD-MCNC: 161 MG/DL (ref 75–110)
GLUCOSE BLD-MCNC: 86 MG/DL (ref 75–110)
POTASSIUM SERPL-SCNC: 3.7 MMOL/L (ref 3.7–5.3)
SODIUM BLD-SCNC: 139 MMOL/L (ref 135–144)

## 2021-12-04 PROCEDURE — 99232 SBSQ HOSP IP/OBS MODERATE 35: CPT | Performed by: PSYCHIATRY & NEUROLOGY

## 2021-12-04 PROCEDURE — 2580000003 HC RX 258: Performed by: STUDENT IN AN ORGANIZED HEALTH CARE EDUCATION/TRAINING PROGRAM

## 2021-12-04 PROCEDURE — 80048 BASIC METABOLIC PNL TOTAL CA: CPT

## 2021-12-04 PROCEDURE — 95714 VEEG EA 12-26 HR UNMNTR: CPT

## 2021-12-04 PROCEDURE — 1200000000 HC SEMI PRIVATE

## 2021-12-04 PROCEDURE — 36415 COLL VENOUS BLD VENIPUNCTURE: CPT

## 2021-12-04 PROCEDURE — 95720 EEG PHY/QHP EA INCR W/VEEG: CPT | Performed by: PSYCHIATRY & NEUROLOGY

## 2021-12-04 PROCEDURE — 82947 ASSAY GLUCOSE BLOOD QUANT: CPT

## 2021-12-04 PROCEDURE — 6370000000 HC RX 637 (ALT 250 FOR IP): Performed by: STUDENT IN AN ORGANIZED HEALTH CARE EDUCATION/TRAINING PROGRAM

## 2021-12-04 RX ORDER — LEVETIRACETAM 500 MG/1
1000 TABLET ORAL 2 TIMES DAILY
Status: DISCONTINUED | OUTPATIENT
Start: 2021-12-04 | End: 2021-12-04

## 2021-12-04 RX ADMIN — SUCRALFATE 1 G: 1 TABLET ORAL at 06:49

## 2021-12-04 RX ADMIN — LACTULOSE 30 G: 20 SOLUTION ORAL at 09:00

## 2021-12-04 RX ADMIN — METOPROLOL TARTRATE 25 MG: 25 TABLET ORAL at 09:00

## 2021-12-04 RX ADMIN — SUCRALFATE 1 G: 1 TABLET ORAL at 14:13

## 2021-12-04 RX ADMIN — SODIUM CHLORIDE, PRESERVATIVE FREE 10 ML: 5 INJECTION INTRAVENOUS at 08:59

## 2021-12-04 RX ADMIN — Medication 1000 MCG: at 20:43

## 2021-12-04 RX ADMIN — INSULIN GLARGINE 55 UNITS: 100 INJECTION, SOLUTION SUBCUTANEOUS at 09:00

## 2021-12-04 RX ADMIN — LACTULOSE 30 G: 20 SOLUTION ORAL at 20:44

## 2021-12-04 RX ADMIN — INSULIN LISPRO 10 UNITS: 100 INJECTION, SOLUTION INTRAVENOUS; SUBCUTANEOUS at 09:01

## 2021-12-04 RX ADMIN — LACTULOSE 30 G: 20 SOLUTION ORAL at 14:13

## 2021-12-04 RX ADMIN — PANTOPRAZOLE SODIUM 40 MG: 40 TABLET, DELAYED RELEASE ORAL at 09:00

## 2021-12-04 RX ADMIN — INSULIN LISPRO 2 UNITS: 100 INJECTION, SOLUTION INTRAVENOUS; SUBCUTANEOUS at 08:59

## 2021-12-04 RX ADMIN — PANTOPRAZOLE SODIUM 40 MG: 40 TABLET, DELAYED RELEASE ORAL at 20:43

## 2021-12-04 RX ADMIN — GABAPENTIN 600 MG: 600 TABLET ORAL at 14:13

## 2021-12-04 RX ADMIN — Medication 1000 MCG: at 09:00

## 2021-12-04 RX ADMIN — ROPINIROLE HYDROCHLORIDE 1 MG: 1 TABLET, FILM COATED ORAL at 09:00

## 2021-12-04 RX ADMIN — INSULIN LISPRO 10 UNITS: 100 INJECTION, SOLUTION INTRAVENOUS; SUBCUTANEOUS at 12:26

## 2021-12-04 RX ADMIN — GABAPENTIN 600 MG: 600 TABLET ORAL at 20:43

## 2021-12-04 RX ADMIN — ROPINIROLE HYDROCHLORIDE 1 MG: 1 TABLET, FILM COATED ORAL at 20:43

## 2021-12-04 RX ADMIN — ZONISAMIDE 600 MG: 100 CAPSULE ORAL at 20:43

## 2021-12-04 RX ADMIN — SODIUM CHLORIDE, PRESERVATIVE FREE 10 ML: 5 INJECTION INTRAVENOUS at 20:43

## 2021-12-04 RX ADMIN — INSULIN GLARGINE 55 UNITS: 100 INJECTION, SOLUTION SUBCUTANEOUS at 23:05

## 2021-12-04 RX ADMIN — LEVETIRACETAM 1000 MG: 500 TABLET, FILM COATED ORAL at 09:00

## 2021-12-04 RX ADMIN — ROPINIROLE HYDROCHLORIDE 1 MG: 1 TABLET, FILM COATED ORAL at 14:13

## 2021-12-04 RX ADMIN — GABAPENTIN 600 MG: 600 TABLET ORAL at 09:00

## 2021-12-04 RX ADMIN — DULOXETINE HYDROCHLORIDE 120 MG: 30 CAPSULE, DELAYED RELEASE ORAL at 08:59

## 2021-12-04 RX ADMIN — SUCRALFATE 1 G: 1 TABLET ORAL at 20:43

## 2021-12-04 RX ADMIN — INSULIN LISPRO 10 UNITS: 100 INJECTION, SOLUTION INTRAVENOUS; SUBCUTANEOUS at 17:19

## 2021-12-04 RX ADMIN — METOPROLOL TARTRATE 25 MG: 25 TABLET ORAL at 20:56

## 2021-12-04 ASSESSMENT — PAIN SCALES - GENERAL: PAINLEVEL_OUTOF10: 0

## 2021-12-04 NOTE — PROCEDURES
 glucagon (rDNA) injection 1 mg  1 mg IntraMUSCular PRN Migel Alvarez MD        dextrose 5 % solution  100 mL/hr IntraVENous PRN Migel Alvarez MD        DULoxetine (CYMBALTA) extended release capsule 120 mg  120 mg Oral Daily Migel Malone MD   120 mg at 12/04/21 0859    empagliflozin (JARDIANCE) tablet TABS 25 mg  25 mg Oral Daily Migel Malone MD        gabapentin (NEURONTIN) tablet 600 mg  600 mg Oral TID Migel Malone MD   600 mg at 12/04/21 0900    lactulose (CHRONULAC) 10 GM/15ML solution 30 g  45 mL Oral TID Migel Malone MD   30 g at 12/04/21 0900    metoprolol tartrate (LOPRESSOR) tablet 25 mg  25 mg Oral BID Migel Malone MD   25 mg at 12/04/21 0900    pantoprazole (PROTONIX) tablet 40 mg  40 mg Oral BID Migel Malone MD   40 mg at 12/04/21 0900    rOPINIRole (REQUIP) tablet 1 mg  1 mg Oral TID Migel Malone MD   1 mg at 12/04/21 0900    vitamin B-12 (CYANOCOBALAMIN) tablet 1,000 mcg  1,000 mcg Oral BID Migel Malone MD   1,000 mcg at 12/04/21 0900    zonisamide (ZONEGRAN) capsule 600 mg  600 mg Oral Nightly Migel Malone MD   600 mg at 12/03/21 2039    insulin lispro (HUMALOG) injection vial 10 Units  10 Units SubCUTAneous TID  Migel Malone MD   10 Units at 12/04/21 0901    insulin lispro (HUMALOG) injection vial 0-12 Units  0-12 Units SubCUTAneous TID  Christy Lizama MD   2 Units at 12/04/21 0740     Technical Description: This is a 21-channel digital EEG recording with time-locked video. Electrodes were placed in accordance with the 10-20 International System of Electrode Placement. Single lead EKG monitoring was included. Baseline EEG Recording:  A formal baseline EEG recording was not obtained. Day 1 - 12/1/21, starting at 12:54    Interictal EEG Samples:   In the alert state, the posterior background rhythm was a symmetric, well-modulated, 9-10 Hz, 20-40 uV rhythm which reacted symmetrically to eye opening and had a normal frequency-amplitude gradient with an age-appropriate mixture of frequencies. Continues fast with activity was seen over the right frontal leads consistent with breach rhythm. Occasional left frontal polymorphic 6 to 7 Hz theta slowing was seen. Occasional left posterior temporal sharp waves at T5 were seen. During drowsiness, there were bursts of diffuse slowing and waxing and waning of the posterior dominant rhythm. During stage II sleep symmetric V waves, K complexes, and sleep spindles were seen. The appearance of diffuse delta activity was observed in slow wave sleep. No abnormalities were activated by sleep. The EKG channel revealed no abnormalities. Ictal EEG Recording / Patient Events: During this period the patient had no events or seizures. The patient had a pushbutton events at 5:35 PM, 6:12 PM which both appeared accidental.    Summary: During this day of recording no events were recorded. The interictal EEG was abnormal due to breach rhythm over the right frontal region suggesting underlying skull defect. Occasional polymorphic theta slowing over the left frontal region suggested underlying neuronal dysfunction. Occasional left posterior temporal sharp waves conferred an increased risk for focal onset seizures. Monitoring was continued in order to record the patient's typical events. The EKG channel revealed no abnormalities. Day 2 - 12/2/21    Interictal EEG Samples: Interictal EEG was unchanged from yesterday. Ictal EEG Recording / Patient Events: During this period the patient had no events or seizures. Summary: During this day of recording no events were recorded. The interictal EEG was abnormal due to breach rhythm over the right frontal region suggesting underlying skull defect. Occasional polymorphic theta slowing over the left frontal region suggested underlying neuronal dysfunction. Occasional left posterior temporal sharp waves conferred an increased risk for focal onset seizures.  Monitoring was continued in order to record the patient's typical events. The EKG channel revealed no abnormalities. Day 3 - 12/3/21, reviewed through 7:30 am    Interictal EEG Samples: Interictal EEG was unchanged from yesterday. Previously seen left frontal sharp waves were not seen on today's recording. Ictal EEG Recording / Patient Events: During this period the patient had no events or seizures. Summary: During this day of recording no events were recorded. The interictal EEG was abnormal due to breach rhythm over the right frontal region suggesting underlying skull defect. Occasional polymorphic theta slowing over the left frontal region suggested underlying neuronal dysfunction. No abnormal epileptiform activity was seen on today's recording. Monitoring was continued in order to record the patient's typical events. The EKG channel revealed no abnormalities. Day 4 - 12/4/21, reviewed through 7:30 am    Interictal EEG Samples: Interictal EEG was unchanged from yesterday. Ictal EEG Recording / Patient Events: During this period the patient had no events or seizures. Summary: During this day of recording no events were recorded. The interictal EEG was abnormal due to breach rhythm over the right frontal region suggesting underlying skull defect. Occasional polymorphic theta slowing over the left frontal region suggested underlying neuronal dysfunction. No abnormal epileptiform activity was seen on today's recording. Monitoring was continued in order to record the patient's typical events. The EKG channel revealed no abnormalities. Vel Calvo MD  Diplomate, American Board of Psychiatry and Neurology  Diplomate, American Board of Clinical Neurophysiology  Diplomate, American Board of Epilepsy     Example of left frontal slowing      Example of left posterior temporal sharp waves    Please note this is a preliminary report and updated daily.  The final report will have a summary of behavior and electrographic findings with clinical correlation.

## 2021-12-04 NOTE — PLAN OF CARE
Problem: Falls - Risk of:  Goal: Will remain free from falls  Description: Will remain free from falls  12/4/2021 1808 by Kwasi Walter RN  Outcome: Ongoing  12/4/2021 0524 by Mariposa Hopkins RN  Outcome: Met This Shift  Goal: Absence of physical injury  Description: Absence of physical injury  12/4/2021 1808 by Kwasi Walter RN  Outcome: Ongoing  12/4/2021 0524 by Mariposa Hopkins RN  Outcome: Met This Shift     Problem: Pain:  Goal: Pain level will decrease  Description: Pain level will decrease  12/4/2021 1808 by Kwasi Walter RN  Outcome: Ongoing  12/4/2021 0524 by Mariposa Hopkins RN  Outcome: Ongoing  Goal: Control of acute pain  Description: Control of acute pain  12/4/2021 1808 by Kwasi Walter RN  Outcome: Ongoing  12/4/2021 0524 by Mariposa Hopkins RN  Outcome: Ongoing  Goal: Control of chronic pain  Description: Control of chronic pain  12/4/2021 1808 by Kwasi Walter RN  Outcome: Ongoing  12/4/2021 0524 by Mariposa Hopkins RN  Outcome: Ongoing     Problem: Nutrition  Goal: Optimal nutrition therapy  Outcome: Ongoing     Problem: Coping:  Goal: Ability to cope will improve  Description: Ability to cope will improve  12/4/2021 1808 by Kwasi Walter RN  Outcome: Ongoing  12/4/2021 0524 by Mariposa Hopkins RN  Outcome: Ongoing  Goal: Ability to identify appropriate support needs will improve  Description: Ability to identify appropriate support needs will improve  12/4/2021 1808 by Kwasi Walter RN  Outcome: Ongoing  12/4/2021 0524 by Mariposa Hopkins RN  Outcome: Ongoing     Problem: Health Behavior:  Goal: Ability to manage health-related needs will improve  Description: Ability to manage health-related needs will improve  12/4/2021 1808 by Kawsi Walter RN  Outcome: Ongoing  12/4/2021 0524 by Mariposa Hopkins RN  Outcome: Ongoing     Problem: Physical Regulation:  Goal: Signs of adequate cerebral perfusion will increase  Description: Signs of adequate cerebral perfusion will increase  12/4/2021 1808 by Kwasi Walter RN  Outcome: Ongoing  12/4/2021 0524 by Mira Rizo RN  Outcome: Ongoing  Goal: Ability to maintain a stable neurologic state will improve  Description: Ability to maintain a stable neurologic state will improve  12/4/2021 1808 by Michelle Cooper RN  Outcome: Ongoing  12/4/2021 0524 by Mira Rizo RN  Outcome: Ongoing     Problem: Safety:  Goal: Ability to remain free from injury will improve  Description: Ability to remain free from injury will improve  12/4/2021 1808 by Michelle Cooper RN  Outcome: Ongoing  12/4/2021 0524 by Mira Rizo RN  Outcome: Met This Shift     Problem: Self-Concept:  Goal: Level of anxiety will decrease  Description: Level of anxiety will decrease  12/4/2021 1808 by Michelle Cooper RN  Outcome: Ongoing  12/4/2021 0524 by Mira Rizo RN  Outcome: Ongoing  Goal: Ability to verbalize feelings about condition will improve  Description: Ability to verbalize feelings about condition will improve  12/4/2021 1808 by Michelle Cooper RN  Outcome: Ongoing  12/4/2021 0524 by Mira Rizo RN  Outcome: Ongoing     Problem: Skin Integrity:  Goal: Will show no infection signs and symptoms  Description: Will show no infection signs and symptoms  12/4/2021 1808 by Michelle Cooper RN  Outcome: Ongoing  12/4/2021 0524 by Mira Rizo RN  Outcome: Ongoing  Goal: Absence of new skin breakdown  Description: Absence of new skin breakdown  12/4/2021 1808 by Michelle Cooper RN  Outcome: Ongoing  12/4/2021 0524 by Mira Rizo RN  Outcome: Met This Shift

## 2021-12-04 NOTE — PLAN OF CARE
Problem: Falls - Risk of:  Goal: Will remain free from falls  Description: Will remain free from falls  Outcome: Met This Shift  Goal: Absence of physical injury  Description: Absence of physical injury  Outcome: Met This Shift     Problem: Safety:  Goal: Ability to remain free from injury will improve  Description: Ability to remain free from injury will improve  Outcome: Met This Shift     Problem: Skin Integrity:  Goal: Absence of new skin breakdown  Description: Absence of new skin breakdown  Outcome: Met This Shift     Problem: Pain:  Goal: Pain level will decrease  Description: Pain level will decrease  Outcome: Ongoing  Goal: Control of acute pain  Description: Control of acute pain  Outcome: Ongoing  Goal: Control of chronic pain  Description: Control of chronic pain  Outcome: Ongoing     Problem: Coping:  Goal: Ability to cope will improve  Description: Ability to cope will improve  Outcome: Ongoing  Goal: Ability to identify appropriate support needs will improve  Description: Ability to identify appropriate support needs will improve  Outcome: Ongoing     Problem: Health Behavior:  Goal: Ability to manage health-related needs will improve  Description: Ability to manage health-related needs will improve  Outcome: Ongoing     Problem: Physical Regulation:  Goal: Signs of adequate cerebral perfusion will increase  Description: Signs of adequate cerebral perfusion will increase  Outcome: Ongoing  Goal: Ability to maintain a stable neurologic state will improve  Description: Ability to maintain a stable neurologic state will improve  Outcome: Ongoing     Problem: Self-Concept:  Goal: Level of anxiety will decrease  Description: Level of anxiety will decrease  Outcome: Ongoing  Goal: Ability to verbalize feelings about condition will improve  Description: Ability to verbalize feelings about condition will improve  Outcome: Ongoing     Problem: Skin Integrity:  Goal: Will show no infection signs and symptoms  Description: Will show no infection signs and symptoms  Outcome: Ongoing

## 2021-12-04 NOTE — PROGRESS NOTES
[facility]   [address] [place] [address]    Progress note             Date:   12/4/2021  Patient name:  Katlin Godinez  Date of admission:  12/1/2021 11:04 AM  MRN:   [id number]  Account:  [de-identified]  YOB: 1970  PCP:    Sulma Nunez MD  Room:   [address] [place] [address]  Code Status:    Full Code    Chief Complaint:   Medication adverse side effect, seizure disorder direct admit for LTME to wean off Depakote   Interval hx: The Patient was seen and examined at bedside  Hemodynamically stable  229, heart rate 66-78 T-max 98.6 alert oriented x3  No acute events overnight  Patient sitting up in bed resting comfortably on LTM he.  Denies any further episodes with the exception of his right-sided tremor which have been seen previously and are nonepileptic. Plan to discontinue Keppra and Vimpat thus patient will only be on zonisamide 600 mg tonight. Patient agreeable to discharge tomorrow afternoon although did not feel ready yet and preferred 1 more day off Keppra to see if we could provoke one of his episodes. Patient denies headache or any other focal neurologic deficits at this time. Brief History of Present Illness:    The patient is a 54 y.o.   male who presents with adverse side effect from his Depakote elevated ammonia 150s.  Past medical history significant for right frontal subarachnoid cyst status post resection 2012.  Following patient's resection he began to have seizure disorder requiring multiple AEDs including Vimpat 200 mg twice daily, Depakote 500 mg twice daily, Keppra 2000 mg twice daily and zonisamide 60 mg at bedtime.  In July patient had left heel abscess and right leg cellulitis prolonged hospitalization greater than 1 month with acute rehab following during which his seizures worsen.  Patient had hepatic encephalitis with difficulty control ammonia although there was concern for weaning his Depakote as he has been on it for many years.  Patient's current AED regimen has been stable for 3 years. Eros Gilbertrador the last month he has began to have breakthrough seizure-like activity described as eyes rolling in the back of his head eyes closing with nausea vomiting and left facial numbness.  Patient also has episodes of tremor in his right upper extremity and sometimes can involve his right lower extremity.  Patient recently was seen in the outpatient neurology clinic by Liss Lora who wean him off Depakote with plan for LTM E.  Patient's last dose of Depakote was Sunday 3 days ago.     Vitals on arrival 129/65, heart rate 75 T-max 98. 3.  Plan for basic lab work including CBC, CMP, ammonia and Depakote levels.  We will get patient hooked up to [address] [place] [address],[address] continue to monitor for any seizure-like activity. Past Medical History:     Past Medical History:   Diagnosis Date    Abnormal thyroid biopsy     s/p left hemithyroidectomy 4/2855 - follicular adenoma    Acid reflux     Anemia     resolved - previously seen by Dr. Masha Champion (due to enlarged spleen)    Anesthesia     seizures with brain surgery due to blood sugar got really high    Bile reflux gastritis     per EGD 11/16/21 - Dr. Gross Risk - to start Carafate.  Bipolar disorder ([facility]     Blood clot in vein 04/07/2016    [facility]     Cancer [facility]) 04/2019    thyroid    Chest pain     previously seeing Lilo Farias cardiologist, now seeing Dr. Rigoberto Rodriguez (LAD bridging on cath 4/2016)    Chronic back pain     Chronic bronchitis ([facility]     Dr. Riya Heredia Colon polyp 08/30/2016    Depression     seeing Jenae Garcia DVT (deep venous thrombosis) ([facility] [phone]    not on Coumadin due to unable to regulate - Dr. Dale Sal - no etiology found per patient    Esophageal abnormality     nodule     Fatty liver disease, nonalcoholic     U/S 71/[facility]    Furuncle     legs    History of Doppler ultrasound 05/29/2011    No hemodynamically significant carotid stenosis is identified. A thyroid nodule on each side.  Dedicated ultrasound of thyroid gland is suggested to further evaluate if clinically indicated.  History of pulmonary embolism 2007    s/p GFF, related to knee surgery    Hx of blood clots     leg and lung    Hyperlipidemia     severely elevated triglycerides    Hypertension     diastolic    Intracranial arachnoid cyst 11/2012    Dr. Jarrod Ordaz drained, complicated with seizures, DKA    Irritable bowel syndrome     Liver disease     Marylene Shaker - elevated LFT - positive smooth muscle antibody, steatosis per liver bx 3/2014 with Dr. David Galeas (multiple drug resistant organisms) resistance 2012    MRSA (methicillin resistant staph aureus) culture positive     h/o in foot and before brain surgery    Nephrolithiasis     noted on CT abdomen 9/2016, 6/2019    Neuropathy     Pancreatic insufficiency     Positive SANDY (antinuclear antibody)     Dr. Belle Samaniego - first visit in 6/2013    Prolonged emergence from general anesthesia     Restless legs syndrome     Seizures ([address]     started with brain surgery    Sinus tachycardia     Holter 3/2013 - seeing Dr. Miguel Ángel Church fracture [facility])     clips     Sleep apnea     positive sleep apnea per study 10/2020.     Systolic dysfunction     \"systolic bridge\"  EF 74% ECHO 9/2019    Type II or unspecified type diabetes mellitus without mention of complication, not stated as uncontrolled 2007        Past Surgical History:     Past Surgical History:   Procedure Laterality Date    CARDIAC CATHETERIZATION      2011,5-6 years ago   [address]  3-2012   [address]  04/28/2016    [facility]     CARPAL TUNNEL RELEASE  01/2017    CHOLECYSTECTOMY  2004    COLONOSCOPY  2012    COLONOSCOPY  08/2016    2 tubular adenomas - reportedly needs repeat scope in 6 months    CRANIOTOMY  11/2012    arachnoid cyst drainage    EKG 12-LEAD  10/18/2015         ENDOSCOPY, COLON, DIAGNOSTIC      HERNIA REPAIR Right 1996    [facility]--Dr. Ned Mejia    INGUINAL HERNIA REPAIR Right 09/15/2016    Robotic assisted    KNEE ARTHROSCOPY Right 2016    KNEE SURGERY Left 2007    acl and debrided twice    OTHER SURGICAL HISTORY  5-31-11    Tilt table was associated w/ nonspecific symptoms or nausea, otherwise no significant dizziness or syncope. No significant hemodynamic changes. Otherwise, unremarkable tilt table test after 30 minutes of tilting.  OTHER SURGICAL HISTORY  01/20/2017    RIGHT SHOULDER ARTHROSCOPY, OPEN STAN, OPEN ACROMIOPLASTY, BICEP TENDESIS, RIGHT CARPAL TUNNEL RELEASE    SHOULDER SURGERY Right 01/2007    THYROID LOBECTOMY N/A 1/15/2021    THYROID LOBECTOMY, UVULOPALATOPHARYNGOPLAST LAOP performed by Hortencia Smith MD at [facility] ECHOCARDIOGRAM  3-05-11    LV size and systolic function normal. EF 55-65%.  UPPER GASTROINTESTINAL ENDOSCOPY  2012    UPPER GASTROINTESTINAL ENDOSCOPY  2016    Dr. Vasquez Hugo Left 10/22/2019    EGD DILATION SAVORY performed by Ferdie Gilford, MD at [facility] ENDOSCOPY Left 10/22/2019    EGD BIOPSY performed by Ferdie Gilford, MD at [facility] ENDOSCOPY N/A 11/16/2021    EGD performed by Ferdie Gilford, MD at [facility] ENDOSCOPY Left 11/16/2021    EGD BIOPSY performed by Ferdie Gilford, MD at [address]  2007        Medications Prior to Admission:     Prior to Admission medications    Medication Sig Start Date End Date Taking?  Authorizing Provider   vitamin B-12 (CYANOCOBALAMIN) 1000 MCG tablet Take 1,000 mcg by mouth 2 times daily    Yes Historical Provider, MD   promethazine (PHENERGAN) 12.5 MG tablet Take 1-2 tablets by mouth every 8 hours as needed for Nausea 11/11/21  Yes Nancy Doherty MD   insulin glargine (BASAGLAR KWIKPEN) 100 UNIT/ML injection pen Inject 50 Units into the skin 2 times daily   Yes Historical Provider, MD DULoxetine (CYMBALTA) 60 MG extended release capsule Take 120 mg by mouth daily   Yes Historical Provider, MD   metoprolol tartrate (LOPRESSOR) 25 MG tablet Take 1 tablet by mouth 2 times daily 9/17/21  Yes Humberto Armenta MD   gabapentin (NEURONTIN) 600 MG tablet Take 600 mg by mouth 3 times daily. Yes Historical Provider, MD   levETIRAcetam (KEPPRA) 1000 MG tablet Take 2 tablets by mouth 2 times daily 8/4/21  Yes Ashleigh Puente MD   zonisamide (ZONEGRAN) 100 MG capsule Take 5 capsules by mouth nightly  Patient taking differently: Take 600 mg by mouth nightly  8/4/21  Yes Ashleigh Puente MD   insulin lispro (HUMALOG) 100 UNIT/ML injection vial Takes 10 units with meals plus group 2 sliding scale   Yes Historical Provider, MD   pantoprazole (PROTONIX) 40 MG tablet Take 1 tablet by mouth 2 times daily 3/8/21  Yes Humberto Armenta MD   vitamin D (ERGOCALCIFEROL) 1.25 MG (75674 UT) CAPS capsule Take 1 capsule by mouth once a week  Patient taking differently: Take 50,000 Units by mouth once a week Monday 3/8/21  Yes Humberto Armenta MD   rOPINIRole (REQUIP) 1 MG tablet TAKE 1 TABLET BY MOUTH THREE TIMES A DAY 12/22/20  Yes Humberto Amrenta MD   lacosamide (VIMPAT) 100 MG TABS tablet Take 200 mg by mouth 2 times daily.     Yes Historical Provider, MD   sucralfate (CARAFATE) 1 GM/10ML suspension Take 1 g by mouth 4 times daily (before meals and nightly) 11/19/21   Historical Provider, MD   divalproex (DEPAKOTE ER) 250 MG extended release tablet Take 250 mg by mouth 2 times daily 11/10/21   Historical Provider, MD   empagliflozin (JARDIANCE) 25 MG tablet Take 25 mg by mouth daily 9/2/21   Humberto Armenta MD   LORazepam (ATIVAN) 0.5 MG tablet Lorazepam Active 0.5 MG PO Twice Daily as needed 0 August 18th, 2021 10:32am 8/18/21   Historical Provider, MD   blood glucose test strips (ONETOUCH ULTRA) strip 1 each by In Vitro route 3 times daily DX: E11.65 Dispense Onetouch Ultra 2 test strips 8/23/21   Keiry Wagner Nannette Ward MD   lactulose (CHRONULAC) 10 GM/15ML solution Take 15 mLs by mouth 3 times daily  Patient taking differently: Take 45 mLs by mouth 3 times daily  21   Hamilton Cordero MD   sildenafil (VIAGRA) 100 MG tablet Take 1 tablet by mouth as needed for Erectile Dysfunction 19   Ander Blakely MD        Allergies:     Ciprofloxacin-ciproflox hcl er, Heparin, Metformin, Niacin and related, Tramadol hcl, Ultram [tramadol], and Warfarin    Social History:     Tobacco:    reports that he has never smoked. He has never used smokeless tobacco.  Alcohol:      reports no history of alcohol use. Drug Use:  reports no history of drug use. Family History:     Family History   Problem Relation Age of Onset    Heart Disease Father 61        MI    Stroke Brother     Obesity Brother     Arthritis Mother     Seizures Mother     Obesity Sister     Other Brother         pulmonary embolism    Diabetes Daughter     Seizures Brother        Review of Systems:     ROS:  Constitutional  Negative for fever and chills    HEENT  Negative for ear discharge, ear pain, nosebleed    Eyes  Negative for photophobia, pain and discharge    Respiratory  Negative for hemoptysis and sputum    Cardiovascular  Negative for orthopnea, claudication and PND    Gastrointestinal  Negative for abdominal pain, diarrhea, blood in stool    Musculoskeletal  Negative for joint pain, negative for myalgia    Neurology Negative for seizures, loss of consciousness   Skin  Negative for rash or itching    Endo/heme/allergies  Negative for polydipsia, environmental allergy    Psychiatric/behavioral  Negative for suicidal ideation.  Patient is not anxious        Physical Exam:   /72   Pulse 74   Temp 98.6 °F (37 °C) (Oral)   Resp 16   Ht 6' 2\" (1.88 m)   Wt 266 lb 12.8 oz (121 kg)   SpO2 92%   BMI 34.26 kg/m²   Temp (24hrs), Av.6 °F (37 °C), Min:98.6 °F (37 °C), Max:98.6 °F (37 °C)    Recent Labs     21  1723 21  194 12/04/21  0701 12/04/21  0805   POCGLU 99 90 141* 161*       Intake/Output Summary (Last 24 hours) at 12/4/2021 0814  Last data filed at 12/3/2021 1835  Gross per 24 hour   Intake 1480 ml   Output --   Net 1480 ml         NEUROLOGIC EXAMINATION  GENERAL  Appears comfortable and in no distress   HEENT  NC/ AT   NECK  Supple and no bruits heard   MENTAL STATUS:  Alert, oriented, intact memory, no confusion, normal speech, normal language, no hallucination or delusion   CRANIAL NERVES: II     -      Visual fields intact to confrontation  III,IV,VI -  EOMs full, no afferent defect, no KARIS, no ptosis  V     -     Normal facial sensation  VII    -     Normal facial symmetry  VIII   -     Intact hearing  IX,X -     Symmetrical palate  XI    -     Symmetrical shoulder shrug  XII   -     Midline tongue, no atrophy    MOTOR FUNCTION:  significant for good strength of grade 5/5 in bilateral proximal and distal muscle groups of both upper and lower extremities with normal bulk, normal tone and no involuntary movements, no tremor   SENSORY FUNCTION:  Normal touch, normal pin, normal vibration, normal proprioception   CEREBELLAR FUNCTION:  Intact fine motor control over upper limbs   REFLEX FUNCTION:  Symmetric, no perverted reflex, no Babinski sign   STATION and GAIT  Not tested       Investigations:      Laboratory Testing:  Recent Results (from the past 24 hour(s))   POC Glucose Fingerstick    Collection Time: 12/03/21 12:05 PM   Result Value Ref Range    POC Glucose 142 (H) 75 - 110 mg/dL   POC Glucose Fingerstick    Collection Time: 12/03/21  5:23 PM   Result Value Ref Range    POC Glucose 99 75 - 110 mg/dL   POC Glucose Fingerstick    Collection Time: 12/03/21  7:45 PM   Result Value Ref Range    POC Glucose 90 75 - 110 mg/dL   POC Glucose Fingerstick    Collection Time: 12/04/21  7:01 AM   Result Value Ref Range    POC Glucose 141 (H) 75 - 110 mg/dL   POC Glucose Fingerstick    Collection Time: 12/04/21  8:05 AM   Result Value Ref Range    POC Glucose 161 (H) 75 - 110 mg/dL         Assessment :      Primary Problem  Partial seizures [facility])    Active Hospital Problems    Diagnosis Date Noted    Partial seizures ([facility] [address] [R56.9] 11/30/2021       Plan:   59-year-old  gentleman who presents for elective LTME and medication taper.  Patient was taken off Depakote over the last week most recent dosing Sunday 3 days ago.  Past medical history significant for right frontal subarachnoid cyst status post resection 2012 with epilepsy disorder following this.  Patient has multiple different seizure types including partial complex and generalized tonic-clonic seizures.  Has been on steady medication regimen for greater than 5 years including Depakote 500 mg twice daily, Keppra to 2 g twice daily, Vimpat 200 mg twice daily, zonisamide 600 mg at bedtime.  Recently in June had significant infection bilateral lower extremities requiring prolonged hospitalization and outpatient stay with hepatic encephalopathy. Kev Sayres is concern that Depakote has been a bad medication with adverse side effect of hyperammonemia and patient's known diagnosis of AGUILAR.      Assessment  1.  Elective LTME for epilepsy disorder and drug taper  2.  History of right frontal subarachnoid cyst status post resection 2012  3.  Multiple comorbid conditions including diabetes, Aguilar, hypertension, IBS, sleep apnea, thyroid cancer status post resection     Plan  -10 units of Humalog in addition to medium dose sliding scale with meals  -Potassium 3.6 replaced with 40 mEq p.o. improved to 3.8 patient likely will benefit from daily oral K replacement 20meq given high doses of insulin will continue to monitor and replacing additional 40meq today  -Hold Keppra continue zonisamide 600 mg nightly  -Vimpat 200 mg home dose currently being held  -Patient's tremor captured on LTME not epileptiform will address as outpatient although appearance is parkinsonian-like and will need further work-up in the near future     LTME monitoring started 12/1/2021 at 12:54  Day 1-breach rhythm over right frontal region, occasional polymorphic theta slowing over left frontal suggestive of underlying neuronal dysfunction, occasional left posterior temporal sharp waves conferred increased risk for focal onset seizures. Patient did have 1 pushbutton which was nonepileptiform with left arm shaking/tremor  Day 2-unchanged from day 1 without any new events vimpat discontinued 12/2 evening   Day 3- unchanged keppra evening dose decreased to 1000mg   Day 4-unchanged from yesterday Keppra discontinued          Follow-up further recommendations after discussing the case with attending  The plan was discussed with the patient, patient's family and the medical staff. Consultations:   IP CONSULT TO SOCIAL WORK    Patient is admitted as inpatient status because of co-morbidities listed above, severity of signs and symptoms as outlined, requirement for current medical therapies and most importantly because of direct risk to patient if care not provided in a hospital setting.     Hiram Everett MD   PGY-3 Neurology Resident   12/4/2021  8:14 AM    Copy sent to Dr. Mila Dyer MD

## 2021-12-05 VITALS
SYSTOLIC BLOOD PRESSURE: 117 MMHG | RESPIRATION RATE: 18 BRPM | BODY MASS INDEX: 34.24 KG/M2 | WEIGHT: 266.8 LBS | TEMPERATURE: 98.2 F | DIASTOLIC BLOOD PRESSURE: 74 MMHG | HEIGHT: 74 IN | HEART RATE: 76 BPM | OXYGEN SATURATION: 98 %

## 2021-12-05 LAB
GLUCOSE BLD-MCNC: 116 MG/DL (ref 75–110)
GLUCOSE BLD-MCNC: 153 MG/DL (ref 75–110)

## 2021-12-05 PROCEDURE — 82947 ASSAY GLUCOSE BLOOD QUANT: CPT

## 2021-12-05 PROCEDURE — 2580000003 HC RX 258: Performed by: STUDENT IN AN ORGANIZED HEALTH CARE EDUCATION/TRAINING PROGRAM

## 2021-12-05 PROCEDURE — 99239 HOSP IP/OBS DSCHRG MGMT >30: CPT | Performed by: PSYCHIATRY & NEUROLOGY

## 2021-12-05 PROCEDURE — 95711 VEEG 2-12 HR UNMONITORED: CPT

## 2021-12-05 PROCEDURE — 6370000000 HC RX 637 (ALT 250 FOR IP): Performed by: STUDENT IN AN ORGANIZED HEALTH CARE EDUCATION/TRAINING PROGRAM

## 2021-12-05 PROCEDURE — 95720 EEG PHY/QHP EA INCR W/VEEG: CPT | Performed by: PSYCHIATRY & NEUROLOGY

## 2021-12-05 PROCEDURE — 94761 N-INVAS EAR/PLS OXIMETRY MLT: CPT

## 2021-12-05 PROCEDURE — 6360000002 HC RX W HCPCS: Performed by: STUDENT IN AN ORGANIZED HEALTH CARE EDUCATION/TRAINING PROGRAM

## 2021-12-05 RX ORDER — PRIMIDONE 50 MG/1
25 TABLET ORAL NIGHTLY
Qty: 30 TABLET | Refills: 0 | Status: SHIPPED | OUTPATIENT
Start: 2021-12-05 | End: 2021-12-23 | Stop reason: SDUPTHER

## 2021-12-05 RX ORDER — LEVETIRACETAM 500 MG/1
2000 TABLET ORAL 2 TIMES DAILY
Status: DISCONTINUED | OUTPATIENT
Start: 2021-12-05 | End: 2021-12-05 | Stop reason: HOSPADM

## 2021-12-05 RX ORDER — LORAZEPAM 2 MG/ML
1 INJECTION INTRAMUSCULAR ONCE
Status: COMPLETED | OUTPATIENT
Start: 2021-12-05 | End: 2021-12-05

## 2021-12-05 RX ORDER — LACOSAMIDE 100 MG/1
200 TABLET ORAL 2 TIMES DAILY
Status: DISCONTINUED | OUTPATIENT
Start: 2021-12-05 | End: 2021-12-05 | Stop reason: HOSPADM

## 2021-12-05 RX ORDER — POTASSIUM CHLORIDE 20 MEQ/1
20 TABLET, EXTENDED RELEASE ORAL DAILY
Qty: 10 TABLET | Refills: 0 | Status: CANCELLED | OUTPATIENT
Start: 2021-12-05

## 2021-12-05 RX ORDER — PRIMIDONE 50 MG/1
25 TABLET ORAL NIGHTLY
Status: DISCONTINUED | OUTPATIENT
Start: 2021-12-05 | End: 2021-12-05 | Stop reason: HOSPADM

## 2021-12-05 RX ADMIN — INSULIN LISPRO 2 UNITS: 100 INJECTION, SOLUTION INTRAVENOUS; SUBCUTANEOUS at 09:14

## 2021-12-05 RX ADMIN — GABAPENTIN 600 MG: 600 TABLET ORAL at 09:14

## 2021-12-05 RX ADMIN — METOPROLOL TARTRATE 25 MG: 25 TABLET ORAL at 09:14

## 2021-12-05 RX ADMIN — LEVETIRACETAM 2000 MG: 500 TABLET, FILM COATED ORAL at 12:26

## 2021-12-05 RX ADMIN — GABAPENTIN 600 MG: 600 TABLET ORAL at 14:04

## 2021-12-05 RX ADMIN — LACOSAMIDE 200 MG: 100 TABLET, FILM COATED ORAL at 12:26

## 2021-12-05 RX ADMIN — INSULIN LISPRO 10 UNITS: 100 INJECTION, SOLUTION INTRAVENOUS; SUBCUTANEOUS at 12:26

## 2021-12-05 RX ADMIN — DULOXETINE HYDROCHLORIDE 120 MG: 30 CAPSULE, DELAYED RELEASE ORAL at 09:14

## 2021-12-05 RX ADMIN — Medication 1000 MCG: at 09:14

## 2021-12-05 RX ADMIN — INSULIN GLARGINE 55 UNITS: 100 INJECTION, SOLUTION SUBCUTANEOUS at 09:14

## 2021-12-05 RX ADMIN — PANTOPRAZOLE SODIUM 40 MG: 40 TABLET, DELAYED RELEASE ORAL at 09:14

## 2021-12-05 RX ADMIN — SUCRALFATE 1 G: 1 TABLET ORAL at 06:23

## 2021-12-05 RX ADMIN — SODIUM CHLORIDE, PRESERVATIVE FREE 10 ML: 5 INJECTION INTRAVENOUS at 09:17

## 2021-12-05 RX ADMIN — ROPINIROLE HYDROCHLORIDE 1 MG: 1 TABLET, FILM COATED ORAL at 14:04

## 2021-12-05 RX ADMIN — INSULIN LISPRO 10 UNITS: 100 INJECTION, SOLUTION INTRAVENOUS; SUBCUTANEOUS at 09:17

## 2021-12-05 RX ADMIN — ROPINIROLE HYDROCHLORIDE 1 MG: 1 TABLET, FILM COATED ORAL at 09:14

## 2021-12-05 RX ADMIN — LORAZEPAM 1 MG: 2 INJECTION INTRAMUSCULAR; INTRAVENOUS at 09:17

## 2021-12-05 RX ADMIN — LACTULOSE 30 G: 20 SOLUTION ORAL at 09:14

## 2021-12-05 ASSESSMENT — PAIN SCALES - GENERAL: PAINLEVEL_OUTOF10: 0

## 2021-12-05 NOTE — PROGRESS NOTES
[facility]   [facility]    Resident Progress Note             Date:   12/5/2021  Patient name:  Priti Kirk  Date of admission:  12/1/2021 11:04 AM  MRN:   [id number]  Account:  [de-identified]  YOB: 1970  PCP:    Ascencion Fleischer, MD  Room:   [address] [place] [address]  Code Status:    Full Code      Brief History and interval:     This is a 46 y.o.  male admitted 12/1/2021 for Partial seizures ([facility] [address] [R56.9]   The patient is a 54 y.o.   male who presents with adverse side effect from his Depakote elevated ammonia 150s.  Past medical history significant for right frontal subarachnoid cyst status post resection 2012.  Following patient's resection he began to have seizure disorder requiring multiple AEDs including Vimpat 200 mg twice daily, Depakote 500 mg twice daily, Keppra 2000 mg twice daily and zonisamide 60 mg at bedtime.  In July patient had left heel abscess and right leg cellulitis prolonged hospitalization greater than 1 month with acute rehab following during which his seizures worsen.  Patient had hepatic encephalitis with difficulty control ammonia although there was concern for weaning his Depakote as he has been on it for many years.  Patient's current AED regimen has been stable for 3 years. Kodi Almaraz the last month he has began to have breakthrough seizure-like activity described as eyes rolling in the back of his head eyes closing with nausea vomiting and left facial numbness.  Patient also has episodes of tremor in his right upper extremity and sometimes can involve his right lower extremity.  Patient recently was seen in the outpatient neurology clinic by Liudmila Sin who wean him off Depakote with plan for LTM E.  Patient's last dose of Depakote was Sunday 3 days ago. Vitals on arrival 129/65, heart rate 75 T-max 98. 3.  Plan for basic lab work including CBC, CMP, ammonia and Depakote levels.  We will get patient hooked up to [facility] continue to monitor for any seizure-like activity. Vital signs, Input/output, lines/ drains, labs, cultures, imaging studies, medications, orders, consultations notes- all reviewed. Subjective: The Patient was seen and examined at bedside  Hemodynamically stable. alert oriented x3  He mentioned he had 3 episodes of intermittent right arm violent shaking lasting about 10 minutes accompanied by arm pain. This morning, around 8:50 AM, patient had 1 of these episodes of violent right arm shaking. For his pain, I gave him 1 mg of Ativan just to help calm him down. From the records, it appears the patient had those intermittent tremors/shakiness of the right upper extremity that were nonepileptogenic. He was only on zonisamide 600 mg last night. Patient denies headache or any other focal neurologic deficits at this time. Anticipating discharge today. Will restart home doses of Vimpat and Keppra along with zonisamide. Briefly for a few seconds when the patient was distracted, the hand shaking/tremor stopped     ROS  Constitutional: no fever, chills, fatigue  HENT: No change in vision or hearing   Respiratory: No cough, SOB, wheezing. Cardiovascular:  No chest pain, palpitations, leg swelling. Gastrointestinal: No nausea, vomiting, diarrhea. Genitourinary: No increased frequency, urgency. Musculoskeletal: No myalgia or arthralgia. Skin: No rashes or scarring or bruises. Neurological: No headache, paresthesia, or focal weakness. Right arm shaking three times reported over night. Endo/Heme/Allergies: Negative for itchy eyes or runny nose. Psychiatric/Behavioral: No anxiety or depressed mood.         lacosamide  200 mg Oral BID    levETIRAcetam  2,000 mg Oral BID    primidone  25 mg Oral Nightly    sucralfate  1 g Oral 3 times per day    insulin glargine  55 Units SubCUTAneous BID    sodium chloride flush  5-40 mL IntraVENous 2 times per day    DULoxetine  120 mg Oral Daily    empagliflozin 25 mg Oral Daily    gabapentin  600 mg Oral TID    lactulose  45 mL Oral TID    metoprolol tartrate  25 mg Oral BID    pantoprazole  40 mg Oral BID    rOPINIRole  1 mg Oral TID    vitamin B-12  1,000 mcg Oral BID    zonisamide  600 mg Oral Nightly    insulin lispro  10 Units SubCUTAneous TID WC    insulin lispro  0-12 Units SubCUTAneous TID WC       Past Medical History:   Diagnosis Date    Abnormal thyroid biopsy     s/p left hemithyroidectomy 4/8947 - follicular adenoma    Acid reflux     Anemia     resolved - previously seen by Dr. Ishmael Whitaker (due to enlarged spleen)    Anesthesia     seizures with brain surgery due to blood sugar got really high    Bile reflux gastritis     per EGD 11/16/21 - Dr. Motta [facility] - to start Carafate.  Bipolar disorder ([facility] [address]     Blood clot in vein 04/07/2016    [facility]) 04/2019    thyroid    Chest pain     previously seeing [facility] cardiologist, now seeing Dr. Lala Ibarra (LAD bridging on cath 4/2016)    Chronic back pain     Chronic bronchitis ([facility] [address]     Dr. Ferrer Kidney Colon polyp 08/30/2016    Depression     seeing Brandy Fink DVT (deep venous thrombosis) ([facility] [address] [id number]    not on Coumadin due to unable to regulate - Dr. Vinh Barriga - no etiology found per patient    Esophageal abnormality     nodule     Fatty liver disease, nonalcoholic     U/S 03/[facility]    [facility]     [facility]    History of Doppler ultrasound 05/29/2011    No hemodynamically significant carotid stenosis is identified. A thyroid nodule on each side. Dedicated ultrasound of thyroid gland is suggested to further evaluate if clinically indicated.      History of pulmonary embolism 2007    s/p GFF, related to knee surgery    Hx of blood clots     leg and lung    Hyperlipidemia     severely elevated triglycerides    Hypertension     diastolic    Intracranial arachnoid cyst 11/2012    Dr. Mark Beard drained, complicated with seizures, DKA    Irritable bowel syndrome     Liver disease     Rizwan Mealing Estelaippmona - elevated LFT - positive smooth muscle antibody, steatosis per liver bx 3/2014 with Dr. Melissa Louise (multiple drug resistant organisms) resistance 2012    MRSA (methicillin resistant staph aureus) culture positive     h/o in foot and before brain surgery    Nephrolithiasis     noted on CT abdomen 9/2016, 6/2019    Neuropathy     Pancreatic insufficiency     Positive SANDY (antinuclear antibody)     Dr. Neri Eddy - first visit in 6/2013    Prolonged emergence from general anesthesia     Restless legs syndrome     Seizures ([address]     started with brain surgery    Sinus tachycardia     Holter 3/2013 - seeing Dr. Eduard Templeton Skull fracture [facility])     clips     Sleep apnea     positive sleep apnea per study 10/2020.  Systolic dysfunction     \"systolic bridge\"  EF 29% ECHO 9/2019    Type II or unspecified type diabetes mellitus without mention of complication, not stated as uncontrolled 2007       Past Surgical History:   Procedure Laterality Date    CARDIAC CATHETERIZATION      2011,5-6 years ago   [address]  3-2012   [address]  04/28/2016    [facility]     CARPAL TUNNEL RELEASE  01/2017    CHOLECYSTECTOMY  2004    COLONOSCOPY  2012    COLONOSCOPY  08/2016    2 tubular adenomas - reportedly needs repeat scope in 6 months    CRANIOTOMY  11/2012    arachnoid cyst drainage    EKG 12-LEAD  10/18/2015         ENDOSCOPY, COLON, DIAGNOSTIC      HERNIA REPAIR Right 1996    [facility]--Dr. [place] INGUINAL HERNIA REPAIR Right 09/15/2016    Robotic assisted    KNEE ARTHROSCOPY Right 2016    KNEE SURGERY Left 2007    acl and debrided twice    OTHER SURGICAL HISTORY  5-31-11    Tilt table was associated w/ nonspecific symptoms or nausea, otherwise no significant dizziness or syncope. No significant hemodynamic changes. Otherwise, unremarkable tilt table test after 30 minutes of tilting.      OTHER SURGICAL HISTORY  01/20/2017    RIGHT SHOULDER ARTHROSCOPY, OPEN [place], OPEN ACROMIOPLASTY, BICEP TENDESIS, RIGHT CARPAL TUNNEL RELEASE    SHOULDER SURGERY Right 01/2007    THYROID LOBECTOMY N/A 1/15/2021    THYROID LOBECTOMY, UVULOPALATOPHARYNGOPLAST LAOP performed by Aida Cruz MD at [facility] ECHOCARDIOGRAM  3-05-11    LV size and systolic function normal. EF 55-65%.  UPPER GASTROINTESTINAL ENDOSCOPY  2012    UPPER GASTROINTESTINAL ENDOSCOPY  2016    Dr. Estefany Ellis Left 10/22/2019    EGD DILATION SAVORY performed by Carlos A Pryor MD at [address] ENDOSCOPY Left 10/22/2019    EGD BIOPSY performed by Carlos A Pryor MD at [address] [place] [address] N/A 11/16/2021    EGD performed by Carlos A Pryor MD at [facility] ENDOSCOPY Left 11/16/2021    EGD BIOPSY performed by Carlos A Pryor MD at [address]  2007       Objective:     PHYSICAL EXAM:      Blood pressure 117/74, pulse 76, temperature 98.2 °F (36.8 °C), temperature source Oral, resp. rate 18, height 6' 2\" (1.88 m), weight 266 lb 12.8 oz (121 kg), SpO2 98 %. General Examination    General Resting comfortably in bed   Head Normocephalic, without obvious abnormality   Neck Supple, symmetrical. Good ROM. No midline or paraspinal tenderness. Lungs Respirations unlabored, no wheezing   Chest Wall No deformity   Heart RRR, no murmur   Abdomen Soft. Non-tender, non-distended   Extremities No cyanosis or edema or warmth. Pulses 2+ and symmetric   Skin: Skin  turgor normal, no rashes or lesions     Mental status  Speech Alert. Oriented to person, place, and time. Speech is fluent without paraphasic errors  Good repetition and naming  Can do 1 step, 2 step, and cross-body commands  Can spell world backwards. Language appropriate. No hallucinations or delusions. No SI/HI.     Cranial nerves   II - VFF, visual threat intact  III, IV, frontal lobe. Right frontal craniotomy. No acute hemorrhage. No extra-axial collection. No infarct. Ventricles are normal. Mucous changes cyst or polyp left maxillary sinus. Remaining paranasal sinuses and mastoid air cells are clear. Stable appearance of the brain no acute process **This report has been created using voice recognition software. It may contain minor errors which are inherent in voice recognition technology. ** Final report electronically signed by Dr. Yolanda Cameron on 11/12/2021 8:49 PM    XR CHEST PORTABLE    Result Date: 11/12/2021  PROCEDURE: XR CHEST PORTABLE CLINICAL INFORMATION: sob . TECHNIQUE: Portable semiupright COMPARISON: 11/15/2020 FINDINGS: Patient is significantly rotated and left lateral costophrenic angle is excluded from the image. Heart size is normal. Mediastinum is not widened. No infiltrates or effusions are seen. Motion artifact degrades image. EKG leads overlie the chest.     No acute finding **This report has been created using voice recognition software. It may contain minor errors which are inherent in voice recognition technology. ** Final report electronically signed by Dr. Yolanda Cameron on 11/12/2021 8:18 PM    VL DUP LOWER EXTREMITY VENOUS BILATERAL    Result Date: 11/13/2021  PROCEDURE: VL DUP LOWER EXTREMITY VENOUS BILATERAL CLINICAL INFORMATION: Hx DVT, provoking seizures COMPARISON: No prior study. TECHNIQUE: Multiple grayscale and color flow images of the major veins of both lower extremities were obtained from the level of the groin to the level of the ankle. Multiple compression and augmentation maneuvers were performed and spectral Doppler waveforms were generated. .. FINDINGS: RIGHT LOWER EXTREMITY:All the deep veins of the right lower extremity are widely patent with normal flow and normal compressibility. LEFT LOWER EXTREMITY:All the  deep veins of the left lower extremity are widely patent with normal flow and normal compressibility.      Normal venous ultrasound. No evidence for deep venous thrombosis. **This report has been created using voice recognition software. It may contain minor errors which are inherent in voice recognition technology. ** Final report electronically signed by Dr. Chevy Winslow on 11/13/2021 5:56 PM      Assessment & Differential Dx:      Primary Problem  Partial seizures [facility])    Active Hospital Problems    Diagnosis Date Noted    Tremors of nervous system [R25.1]     Partial seizures ([address] [R56.9] 11/30/2021     46year-old  gentleman who presents for elective LTME and medication taper. Patient was taken off Depakote over the last week most recent dosing Sunday 3 days ago. Past medical history significant for right frontal subarachnoid cyst status post resection 2012 with epilepsy disorder following this. Patient has multiple different seizure types including partial complex and generalized tonic-clonic seizures. Has been on steady medication regimen for greater than 5 years including Depakote 500 mg twice daily, Keppra to 2 g twice daily, Vimpat 200 mg twice daily, zonisamide 600 mg at bedtime. Recently in June had significant infection bilateral lower extremities requiring prolonged hospitalization and outpatient stay with hepatic encephalopathy. There is concern that Depakote has been a bad medication with adverse side effect of hyperammonemia and patient's known diagnosis of AGUILAR.      Plan:     -Resume Keppra continue zonisamide 600 mg nightly  -Resume Vimpat 200 mg twice daily home dose  -Patient's tremor captured on LTME not epileptiform will address as outpatient although appearance is parkinsonian-like and will need further work-up in the near future  -will discharge today and follow up with movement disorder specialist   -Primidone 25 mg added qhs until the patient sees a movement disorder specialist     Berna Hunter MD, MD, 12/5/2021 6:51 PM

## 2021-12-05 NOTE — PROGRESS NOTES
[facility] Neurology   [address] [place] [address]    Progress note             Date:   12/5/2021  Patient name:  Linsey Campos  Date of admission:  12/1/2021 11:04 AM  MRN:   [id number]  Account:  [de-identified]  YOB: 1970  PCP:    Minoo Hernández MD  Room:   5655/8577-08  Code Status:    Full Code    Chief Complaint:   Medication adverse side effect, seizure disorder direct admit for LTME to wean off Depakote   Interval hx: The Patient was seen and examined at bedside  Hemodynamically stable. alert oriented x3  He mentioned he had 3 episodes of intermittent right arm violent shaking lasting about 10 minutes accompanied by arm pain. This morning, around 8:50 AM, patient had 1 of these episodes of violent right arm shaking. For his pain, I gave him 1 mg of Ativan just to help calm him down. From the records, it appears the patient had those intermittent tremors/shakiness of the right upper extremity that were nonepileptogenic. He was only on zonisamide 600 mg last night. Patient denies headache or any other focal neurologic deficits at this time. Anticipating discharge today. Will restart home doses of Vimpat and Keppra along with zonisamide. Brief History of Present Illness:    The patient is a 54 y.o.   male who presents with adverse side effect from his Depakote elevated ammonia 150s.  Past medical history significant for right frontal subarachnoid cyst status post resection 2012.  Following patient's resection he began to have seizure disorder requiring multiple AEDs including Vimpat 200 mg twice daily, Depakote 500 mg twice daily, Keppra 2000 mg twice daily and zonisamide 60 mg at bedtime.  In July patient had left heel abscess and right leg cellulitis prolonged hospitalization greater than 1 month with acute rehab following during which his seizures worsen.  Patient had hepatic encephalitis with difficulty control ammonia although there was concern for weaning his Depakote as he has been on it for many years.  Patient's current AED regimen has been stable for 3 years. Cherelle Teixeira the last month he has began to have breakthrough seizure-like activity described as eyes rolling in the back of his head eyes closing with nausea vomiting and left facial numbness.  Patient also has episodes of tremor in his right upper extremity and sometimes can involve his right lower extremity.  Patient recently was seen in the outpatient neurology clinic by Ania Manuel who wean him off Depakote with plan for LTM E.  Patient's last dose of Depakote was Sunday 3 days ago. Vitals on arrival 129/65, heart rate 75 T-max 98. 3.  Plan for basic lab work including CBC, CMP, ammonia and Depakote levels.  We will get patient hooked up to [address] [place] [address],[address] continue to monitor for any seizure-like activity. Past Medical History:     Past Medical History:   Diagnosis Date    Abnormal thyroid biopsy     s/p left hemithyroidectomy 9/2963 - follicular adenoma    Acid reflux     Anemia     resolved - previously seen by Dr. Antonio Tobin (due to enlarged spleen)    Anesthesia     seizures with brain surgery due to blood sugar got really high    Bile reflux gastritis     per EGD 11/16/21 - Dr. Sonido Mackey - to start Carafate.     Bipolar disorder ([facility]     Blood clot in vein 04/07/2016    [facility]) 04/2019    thyroid    Chest pain     previously seeing [facility] cardiologist, now seeing Dr. Timmy Urbina (LAD bridging on cath 4/2016)    Chronic back pain     Chronic bronchitis ([facility]     Dr. Duque Master Colon polyp 08/30/2016    Depression     seeing Jeannie Ybarra DVT (deep venous thrombosis) ([facility] [address] [facility] [id number]    not on Coumadin due to unable to regulate - Dr. Ryan Zhang - no etiology found per patient    Esophageal abnormality     nodule     Fatty liver disease, nonalcoholic     [facility]/S 51/8815 [facility]    Furuncle     legs    History of Doppler ultrasound 05/29/2011    No hemodynamically significant carotid stenosis is identified. A thyroid nodule on each side. Dedicated ultrasound of thyroid gland is suggested to further evaluate if clinically indicated.  History of pulmonary embolism 2007    s/p GFF, related to knee surgery    Hx of blood clots     leg and lung    Hyperlipidemia     severely elevated triglycerides    Hypertension     diastolic    Intracranial arachnoid cyst 11/2012    Dr. Tyson Oleary drained, complicated with seizures, DKA    Irritable bowel syndrome     Liver disease     [place] - elevated LFT - positive smooth muscle antibody, steatosis per liver bx 3/2014 with Dr. Adrianne Parada (multiple drug resistant organisms) resistance 2012    MRSA (methicillin resistant staph aureus) culture positive     h/o in foot and before brain surgery    Nephrolithiasis     noted on CT abdomen 9/2016, 6/2019    Neuropathy     Pancreatic insufficiency     Positive SANDY (antinuclear antibody)     Dr. Amina Owens - first visit in 6/2013    Prolonged emergence from general anesthesia     Restless legs syndrome     Seizures ([address]     started with brain surgery    Sinus tachycardia     Holter 3/2013 - seeing Dr. Pedro Edge fracture [facility])     clips     Sleep apnea     positive sleep apnea per study 10/2020.     Systolic dysfunction     \"systolic bridge\"  EF 82% ECHO 9/2019    Type II or unspecified type diabetes mellitus without mention of complication, not stated as uncontrolled 2007        Past Surgical History:     Past Surgical History:   Procedure Laterality Date    CARDIAC CATHETERIZATION      2011,5-6 years ago   [address]  3-2012   [address]  04/28/2016    [facility]     CARPAL TUNNEL RELEASE  01/2017    CHOLECYSTECTOMY  2004    COLONOSCOPY  2012    COLONOSCOPY  08/2016    2 tubular adenomas - reportedly needs repeat scope in 6 months    CRANIOTOMY  11/2012    arachnoid cyst drainage    EKG 12-LEAD  10/18/2015         ENDOSCOPY, COLON, DIAGNOSTIC      HERNIA REPAIR Right [facility] Right 09/15/2016    Robotic assisted    KNEE ARTHROSCOPY Right 2016    KNEE SURGERY Left 2007    acl and debrided twice    OTHER SURGICAL HISTORY  5-31-11    Tilt table was associated w/ nonspecific symptoms or nausea, otherwise no significant dizziness or syncope. No significant hemodynamic changes. Otherwise, unremarkable tilt table test after 30 minutes of tilting.  OTHER SURGICAL HISTORY  01/20/2017    RIGHT SHOULDER ARTHROSCOPY, OPEN STAN, OPEN ACROMIOPLASTY, BICEP TENDESIS, RIGHT CARPAL TUNNEL RELEASE    SHOULDER SURGERY Right 01/2007    THYROID LOBECTOMY N/A 1/15/2021    THYROID LOBECTOMY, UVULOPALATOPHARYNGOPLAST LAOP performed by Serge Shaver MD at [facility] ECHOCARDIOGRAM  3-05-11    LV size and systolic function normal. EF 55-65%.  UPPER GASTROINTESTINAL ENDOSCOPY  2012    UPPER GASTROINTESTINAL ENDOSCOPY  2016    Dr. Fran Paniagua Left 10/22/2019    EGD DILATION SAVORY performed by Vinny Rodriguez MD at [facility] ENDOSCOPY Left 10/22/2019    EGD BIOPSY performed by Vinny Rodriguez MD at [facility] ENDOSCOPY N/A 11/16/2021    EGD performed by Vinny Rodriguez MD at [facility] ENDOSCOPY Left 11/16/2021    EGD BIOPSY performed by Vinny Rodriguez MD at [address]  2007        Medications Prior to Admission:     Prior to Admission medications    Medication Sig Start Date End Date Taking?  Authorizing Provider   vitamin B-12 (CYANOCOBALAMIN) 1000 MCG tablet Take 1,000 mcg by mouth 2 times daily    Yes Historical Provider, MD   promethazine (PHENERGAN) 12.5 MG tablet Take 1-2 tablets by mouth every 8 hours as needed for Nausea 11/11/21  Yes Makayla Arevalo MD   insulin glargine (BASAGLAR KWIKPEN) 100 UNIT/ML injection pen Inject 50 Units into the skin 2 times daily   Yes Historical Provider, MD   DULoxetine (CYMBALTA) 60 MG extended release capsule Take 120 mg by mouth daily   Yes Historical Provider, MD   metoprolol tartrate (LOPRESSOR) 25 MG tablet Take 1 tablet by mouth 2 times daily 9/17/21  Yes Ridge Davis MD   gabapentin (NEURONTIN) 600 MG tablet Take 600 mg by mouth 3 times daily. Yes Historical Provider, MD   levETIRAcetam (KEPPRA) 1000 MG tablet Take 2 tablets by mouth 2 times daily 8/4/21  Yes Mamadou Alvarez MD   zonisamide (ZONEGRAN) 100 MG capsule Take 5 capsules by mouth nightly  Patient taking differently: Take 600 mg by mouth nightly  8/4/21  Yes Mamadou Alvarez MD   insulin lispro (HUMALOG) 100 UNIT/ML injection vial Takes 10 units with meals plus group 2 sliding scale   Yes Historical Provider, MD   pantoprazole (PROTONIX) 40 MG tablet Take 1 tablet by mouth 2 times daily 3/8/21  Yes Ridge Davis MD   vitamin D (ERGOCALCIFEROL) 1.25 MG (52299 UT) CAPS capsule Take 1 capsule by mouth once a week  Patient taking differently: Take 50,000 Units by mouth once a week Monday 3/8/21  Yes Ridge Davis MD   rOPINIRole (REQUIP) 1 MG tablet TAKE 1 TABLET BY MOUTH THREE TIMES A DAY 12/22/20  Yes Ridge Davis MD   lacosamide (VIMPAT) 100 MG TABS tablet Take 200 mg by mouth 2 times daily.     Yes Historical Provider, MD   sucralfate (CARAFATE) 1 GM/10ML suspension Take 1 g by mouth 4 times daily (before meals and nightly) 11/19/21   Historical Provider, MD   divalproex (DEPAKOTE ER) 250 MG extended release tablet Take 250 mg by mouth 2 times daily 11/10/21   Historical Provider, MD   empagliflozin (JARDIANCE) 25 MG tablet Take 25 mg by mouth daily 9/2/21   Ridge Davis MD   LORazepam (ATIVAN) 0.5 MG tablet Lorazepam Active 0.5 MG PO Twice Daily as needed 0 August 18th, 2021 10:32am 8/18/21   Historical Provider, MD   blood glucose test strips (ONETOUCH ULTRA) strip 1 each by In Vitro route 3 times daily DX: E11.65 Dispense Onetouch Ultra 2 test strips 21   Luis Ponce MD   lactulose (CHRONULAC) 10 GM/15ML solution Take 15 mLs by mouth 3 times daily  Patient taking differently: Take 45 mLs by mouth 3 times daily  21   Fareed Menjivar MD   sildenafil (VIAGRA) 100 MG tablet Take 1 tablet by mouth as needed for Erectile Dysfunction 19   Luis Ponce MD        Allergies:     Ciprofloxacin-ciproflox hcl er, Heparin, Metformin, Niacin and related, Tramadol hcl, Ultram [tramadol], and Warfarin    Social History:     Tobacco:    reports that he has never smoked. He has never used smokeless tobacco.  Alcohol:      reports no history of alcohol use. Drug Use:  reports no history of drug use. Family History:     Family History   Problem Relation Age of Onset    Heart Disease Father 61        MI    Stroke Brother     Obesity Brother     Arthritis Mother     Seizures Mother     Obesity Sister     Other Brother         pulmonary embolism    Diabetes Daughter     Seizures Brother        Review of Systems:     ROS:  Constitutional  Negative for fever and chills    HEENT  Negative for ear discharge, ear pain, nosebleed    Eyes  Negative for photophobia, pain and discharge    Respiratory  Negative for hemoptysis and sputum    Cardiovascular  Negative for orthopnea, claudication and PND    Gastrointestinal  Negative for abdominal pain, diarrhea, blood in stool    Musculoskeletal  Negative for joint pain, negative for myalgia    Neurology Negative for seizures, loss of consciousness. Positive right arm shaking overnight   Skin  Negative for rash or itching    Endo/heme/allergies  Negative for polydipsia, environmental allergy    Psychiatric/behavioral  Negative for suicidal ideation.  Patient is not anxious        Physical Exam:   /74   Pulse 76   Temp 98.2 °F (36.8 °C) (Oral)   Resp 18   Ht 6' 2\" (1.88 m)   Wt 266 lb 12.8 oz (121 kg)   SpO2 98%   BMI 34.26 kg/m²   Temp (24hrs), Av.7 °F (36.5 °C), Min:97.2 °F (36.2 °C), Max:98.2 °F (36.8 °C)    Recent Labs     12/04/21  1658 12/04/21  1947 12/04/21  2259 12/05/21  0758   POCGLU 104 86 148* 153*       Intake/Output Summary (Last 24 hours) at 12/5/2021 1046  Last data filed at 12/5/2021 0531  Gross per 24 hour   Intake 370 ml   Output    Net 370 ml         NEUROLOGIC EXAMINATION  GENERAL  Appears comfortable and in no distress   HEENT  NC/ AT   NECK  Supple and no bruits heard   MENTAL STATUS:  Alert, oriented, intact memory, no confusion, normal speech, normal language, no hallucination or delusion   CRANIAL NERVES: II     -      Visual fields intact to confrontation  III,IV,VI -  EOMs full, no afferent defect, no KARIS, no ptosis  V     -     Normal facial sensation  VII    -     Normal facial symmetry  VIII   -     Intact hearing  IX,X -     Symmetrical palate  XI    -     Symmetrical shoulder shrug  XII   -     Midline tongue, no atrophy    MOTOR FUNCTION:  significant for good strength of grade 5/5 in bilateral proximal and distal muscle groups of both upper and lower extremities with normal bulk, normal tone and no involuntary movements, no tremor   SENSORY FUNCTION:  Normal touch, normal pin, normal vibration, normal proprioception   CEREBELLAR FUNCTION:  Intact fine motor control over upper limbs   REFLEX FUNCTION:  Symmetric, no perverted reflex, no Babinski sign   STATION and GAIT  Not tested       Investigations:      Laboratory Testing:  Recent Results (from the past 24 hour(s))   POC Glucose Fingerstick    Collection Time: 12/04/21 11:56 AM   Result Value Ref Range    POC Glucose 116 (H) 75 - 110 mg/dL   Basic Metabolic Panel w/ Reflex to MG    Collection Time: 12/04/21  2:27 PM   Result Value Ref Range    Glucose 101 (H) 70 - 99 mg/dL    BUN 8 6 - 20 mg/dL    CREATININE 0.83 0.70 - 1.20 mg/dL    Bun/Cre Ratio NOT REPORTED 9 - 20    Calcium 8.6 8.6 - 10.4 mg/dL    Sodium 139 135 - 144 mmol/L    Potassium 3.7 3.7 - 5.3 mmol/L    Chloride 107 98 - 107 mmol/L    CO2 24 20 - 31 mmol/L    Anion Gap 8 (L) 9 - 17 mmol/L    GFR Non-African American >60 >60 mL/min    GFR African American >60 >60 mL/min    GFR Comment          GFR Staging NOT REPORTED    POC Glucose Fingerstick    Collection Time: 12/04/21  4:58 PM   Result Value Ref Range    POC Glucose 104 75 - 110 mg/dL   POC Glucose Fingerstick    Collection Time: 12/04/21  7:47 PM   Result Value Ref Range    POC Glucose 86 75 - 110 mg/dL   POC Glucose Fingerstick    Collection Time: 12/04/21 10:59 PM   Result Value Ref Range    POC Glucose 148 (H) 75 - 110 mg/dL   POC Glucose Fingerstick    Collection Time: 12/05/21  7:58 AM   Result Value Ref Range    POC Glucose 153 (H) 75 - 110 mg/dL         Assessment :      Primary Problem  Partial seizures ([address]    Active Hospital Problems    Diagnosis Date Noted    Tremors of nervous system [R25.1]     Partial seizures ([address] [R56.9] 11/30/2021       Plan:   59-year-old  gentleman who presents for elective LTME and medication taper.  Patient was taken off Depakote over the last week most recent dosing Sunday 3 days ago.  Past medical history significant for right frontal subarachnoid cyst status post resection 2012 with epilepsy disorder following this. Evans Ritter has multiple different seizure types including partial complex and generalized tonic-clonic seizures.  Has been on steady medication regimen for greater than 5 years including Depakote 500 mg twice daily, Keppra to 2 g twice daily, Vimpat 200 mg twice daily, zonisamide 600 mg at bedtime.  Recently in June had significant infection bilateral lower extremities requiring prolonged hospitalization and outpatient stay with hepatic encephalopathy. Shady Granados is concern that Depakote has been a bad medication with adverse side effect of hyperammonemia and patient's known diagnosis of AGUILAR.      Assessment  1.  Elective LTME for epilepsy disorder and drug taper  2.  History of right frontal subarachnoid cyst status post resection 2012  3.  Multiple comorbid conditions including diabetes, Rose, hypertension, IBS, sleep apnea, thyroid cancer status post resection     Plan  -Resume Keppra continue zonisamide 600 mg nightly  -Resume Vimpat 200 mg twice daily home dose  -Patient's tremor captured on LTME not epileptiform will address as outpatient although appearance is parkinsonian-like and will need further work-up in the near future  -will discharge today     Follow-up further recommendations after discussing the case with attending  The plan was discussed with the patient, patient's family and the medical staff. Consultations:   IP CONSULT TO SOCIAL WORK    Patient is admitted as inpatient status because of co-morbidities listed above, severity of signs and symptoms as outlined, requirement for current medical therapies and most importantly because of direct risk to patient if care not provided in a hospital setting.     Berna Hunter MD   PGY-3 Neurology Resident   12/5/2021  10:46 AM    Copy sent to Dr. Makayla Arevalo MD

## 2021-12-05 NOTE — PLAN OF CARE
Problem: Falls - Risk of:  Goal: Will remain free from falls  Description: Will remain free from falls  Outcome: Completed  Goal: Absence of physical injury  Description: Absence of physical injury  Outcome: Completed     Problem: Pain:  Goal: Pain level will decrease  Description: Pain level will decrease  Outcome: Completed  Goal: Control of acute pain  Description: Control of acute pain  Outcome: Completed  Goal: Control of chronic pain  Description: Control of chronic pain  Outcome: Completed     Problem: Nutrition  Goal: Optimal nutrition therapy  Outcome: Completed     Problem: Coping:  Goal: Ability to cope will improve  Description: Ability to cope will improve  Outcome: Completed  Goal: Ability to identify appropriate support needs will improve  Description: Ability to identify appropriate support needs will improve  Outcome: Completed     Problem: Health Behavior:  Goal: Ability to manage health-related needs will improve  Description: Ability to manage health-related needs will improve  Outcome: Completed     Problem: Physical Regulation:  Goal: Signs of adequate cerebral perfusion will increase  Description: Signs of adequate cerebral perfusion will increase  Outcome: Completed  Goal: Ability to maintain a stable neurologic state will improve  Description: Ability to maintain a stable neurologic state will improve  Outcome: Completed     Problem: Safety:  Goal: Ability to remain free from injury will improve  Description: Ability to remain free from injury will improve  Outcome: Completed     Problem: Self-Concept:  Goal: Level of anxiety will decrease  Description: Level of anxiety will decrease  Outcome: Completed  Goal: Ability to verbalize feelings about condition will improve  Description: Ability to verbalize feelings about condition will improve  Outcome: Completed     Problem: Skin Integrity:  Goal: Will show no infection signs and symptoms  Description: Will show no infection signs and symptoms  Outcome: Completed  Goal: Absence of new skin breakdown  Description: Absence of new skin breakdown  Outcome: Completed

## 2021-12-07 ENCOUNTER — TELEPHONE (OUTPATIENT)
Dept: NEUROLOGY | Age: 51
End: 2021-12-07

## 2021-12-07 NOTE — TELEPHONE ENCOUNTER
Pt called in stating he was discharged from his LTME on 12/5/21. He said that it was discussed that he should have Ativan, I do not see any notation if this. It was mentioned that his tremors caught on the LTME seemed to be Parkinson in nature, he was then started on Primidone. Please review LTME notes and advise, thanks.

## 2021-12-09 ENCOUNTER — TELEPHONE (OUTPATIENT)
Dept: NEUROLOGY | Age: 51
End: 2021-12-09

## 2021-12-10 NOTE — DISCHARGE SUMMARY
[address] [place] [address]     Department of Neurology    INPATIENT DISCHARGE SUMMARY        Patient Identification:  Divina Ordaz is a 46 y.o. male. :  1970  MRN: [id number]     Acct: [de-identified]   Admit Date:  2021  Discharge date and time: 2021  5:22 PM   Attending Provider: No att. providers found                                     Admission Diagnoses:   Partial seizures ([facility] [address] [R56.9]    Discharge Diagnoses:   Principal Problem:    Partial seizures ([address]  Active Problems:    Tremors of nervous system  Resolved Problems:    * No resolved hospital problems. *       Consults:   none    Brief Inpatient course:  80-year-old  gentleman who presents for elective LTME and medication taper.  Patient was taken off Depakote over the last week most recent dosing  3 days ago prior to day of admission which was 2021.  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  Patient has multiple different seizure types including partial complex and generalized tonic-clonic seizures.  Has been on steady medication regimen for greater than 5 years including Depakote 500 mg twice daily, Keppra to 2 g twice daily, Vimpat 200 mg twice daily, zonisamide 600 mg at bedtime.  Patient underwent 5 days LTME weaned off vimpat first then Marin over 5 days and LTME was unchanged from day of admission completely off Depakote and levels undetectable at time of admission.      Assessment  1.  Elective LTME for epilepsy disorder and drug taper  2.  History of right frontal subarachnoid cyst status post resection   3.  Multiple comorbid conditions including diabetes, Rose, hypertension, IBS, sleep apnea, thyroid cancer status post resection     Plan  -patient required daily potassium replacement discussed that he needs to discuss this with his PCP   -Continue Keppra 2grams BID, Vimpat 200mg BID, zonisamide 600mg at bed time   -Patient's tremor captured on LTME not epileptiform will address as outpatient although appearance is parkinsonian-like and will need further work-up in the near future   -FU outpatient with [facility]     MRI brain WO 8/2/2021  Impression   Areas of encephalomalacia and gliosis in the right frontal lobe and medial   aspect the cerebellar hemispheres from previous areas of injury or insult. No acute brain parenchymal abnormality.        Procedures:    LTME for 5 days starting 12/1/2021 12:54  Day 1-breach rhythm over right frontal region, occasional polymorphic theta slowing over left frontal suggestive of underlying neuronal dysfunction, occasional left posterior temporal sharp waves conferred increased risk for focal onset seizures.  Patient did have 1 pushbutton which was nonepileptiform with left arm shaking/tremor  Day 2-unchanged from day 1 without any new events vimpat discontinued 12/2 evening   Day 3- unchanged keppra evening dose decreased to 1000mg   Day 4-unchanged from yesterday Keppra discontinued  Day 5-unchanged  Example of left frontal slowing       Example of left posterior temporal sharp waves       Any Hospital Acquired Infections: none    Discharge Functional Status:  stable    Disposition: home    Patient Instructions:   Discharge Medication List as of 12/5/2021  4:36 PM      START taking these medications    Details   primidone (MYSOLINE) 50 MG tablet Take 0.5 tablets by mouth nightly, Disp-30 tablet, R-0Normal         CONTINUE these medications which have NOT CHANGED    Details   vitamin B-12 (CYANOCOBALAMIN) 1000 MCG tablet Take 1,000 mcg by mouth 2 times daily Historical Med      promethazine (PHENERGAN) 12.5 MG tablet Take 1-2 tablets by mouth every 8 hours as needed for Nausea, Disp-60 tablet, R-1Phone In      insulin glargine (BASAGLAR KWIKPEN) 100 UNIT/ML injection pen Inject 50 Units into the skin 2 times dailyHistorical Med      DULoxetine (CYMBALTA) 60 MG extended release capsule Take 120 mg by mouth dailyHistorical Med      metoprolol tartrate (LOPRESSOR) 25 MG tablet Take 1 tablet by mouth 2 times daily, Disp-60 tablet, R-5Normal      gabapentin (NEURONTIN) 600 MG tablet Take 600 mg by mouth 3 times daily. Historical Med      levETIRAcetam (KEPPRA) 1000 MG tablet Take 2 tablets by mouth 2 times daily, Disp-60 tablet, R-3Normal      zonisamide (ZONEGRAN) 100 MG capsule Take 5 capsules by mouth nightly, Disp-30 capsule, R-3Normal      insulin lispro (HUMALOG) 100 UNIT/ML injection vial Takes 10 units with meals plus group 2 sliding scaleHistorical Med      pantoprazole (PROTONIX) 40 MG tablet Take 1 tablet by mouth 2 times daily, Disp-180 tablet, R-1Normal      vitamin D (ERGOCALCIFEROL) 1.25 MG (18412 UT) CAPS capsule Take 1 capsule by mouth once a week, Disp-12 capsule, R-1Normal      rOPINIRole (REQUIP) 1 MG tablet TAKE 1 TABLET BY MOUTH THREE TIMES A DAY, Disp-270 tablet, R-1Normal      lacosamide (VIMPAT) 100 MG TABS tablet Take 200 mg by mouth 2 times daily.  Historical Med      sucralfate (CARAFATE) 1 GM/10ML suspension Take 1 g by mouth 4 times daily (before meals and nightly)Historical Med      empagliflozin (JARDIANCE) 25 MG tablet Take 25 mg by mouth daily, Disp-56 tablet, R-0Lot # [id number] expiration date 6/2023 received 8/23/2021Sample      LORazepam (ATIVAN) 0.5 MG tablet Lorazepam Active 0.5 MG PO Twice Daily as needed 0 August 18th, 2021 10:32amHistorical Med      blood glucose test strips (ONETOUCH ULTRA) strip 1 each by In Vitro route 3 times daily DX: E11.65 Dispense Onetouch Ultra 2 test strips, Disp-300 each, R-3Normal      lactulose (CHRONULAC) 10 GM/15ML solution Take 15 mLs by mouth 3 times daily, Disp-2 Bottle, R-1Normal      sildenafil (VIAGRA) 100 MG tablet Take 1 tablet by mouth as needed for Erectile Dysfunction, Disp-45 tablet, R-3Print         STOP taking these medications       divalproex (DEPAKOTE ER) 250 MG extended release tablet Comments:   Reason for Stopping:         divalproex (DEPAKOTE) 250 MG  tablet Comments:   Reason for Stopping:               Activity: activity as tolerated with seizure precautions including no driving     Diet: regular diet    Follow-up:    Sharri Chapman MD  [address] [address]  [phone]    In 3 weeks  hospital follow up     MD Genie Funes, [address]   [address] [phone]    In 1 week  check [address] [address] [facility] Velma Middleton MD  [address] [facility] [phone]    Schedule an appointment as soon as possible for a visit      Rosa Jeffery  [address] [facility]  [phone]    Schedule an appointment as soon as possible for a visit        Follow up labs: None at time of discharge     Follow up imaging: None at time of discharge     Note that over 36minutes was spent in preparing discharge papers, discussing discharge with patient, medication review, etc.      Trudi Kelly MD,  PGY 3 Neurology Resident  Department of Neurology  [address]  12/10/2021, 1:55 PM

## 2021-12-13 NOTE — TELEPHONE ENCOUNTER
Jerking movements that were seen on your long-term epilepsy monitoring were not considered to be epileptic. The patient was started on primidone for the jerking movements.   Can we ask if he seen some improvement since he has been on that medication

## 2021-12-14 NOTE — TELEPHONE ENCOUNTER
Call placed to the patient and this information was given. Patient stated that the episode was a seizure, that this is the beginning of what his seizures are like. He also stated that the Mysoline has not helped him at all.

## 2021-12-14 NOTE — PROGRESS NOTES
Physician Progress Note      Ezra Michael  [facility] #:                  [id number]  :                       1970  ADMIT DATE:       2021 11:04 AM  DISCH DATE:        2021 5:22 PM  RESPONDING  PROVIDER #:        Maikel Rosenberg MD          QUERY TEXT:    Pt admitted with RUE tremors. Pt noted to have Parkinson's disease. If   possible, please document in progress notes and discharge summary the   relationship, if any, between suspected etiology of the  tremors and   Parkinson's disease. The medical record reflects the following:  Risk Factors: Parkinson's disease, tremors, Seizure disorder epileptic vs.   nonepileptic, partial seizures, right frontal subarachnoid cyst status post   resection  with epilepsy disorder following  Clinical Indicators:  tremor described as a right upper extremity pill-rolling   tremor which is intermittent. Progress notes stated, \"Patient's tremor   captured on LTME not epileptiform will address as outpatient although   appearance is parkinsonian-like and will need further work-up in the near   future. Focal epilepsy, RUE tremor; not focal seizure. Tremor vs. Dyskinesia, vs   dystonia, vs psychogenic. Maintain off of Depakote, as it had been weaned   prior to arrival. Could also make his tremors worse. Long discussion regarding   etiology of his right upper extremity tremor which is episodic in nature. ? Recommended to start primidone 25 mg nightly. ? He should also follow-up with   movement disorder specialist for further investigation. Treatment: Primidone 25 mg nightly. Thank Miguel A Horan RN, CDS  email - [email or web address]  Options provided:  -- tremors due to Parkinson's disease  -- tremors due to##. please specify, . please specify.   -- tremors unrelated to Parkinson's disease  -- Other - I will add my own diagnosis  -- Disagree - Not applicable / Not valid  -- Disagree - Clinically unable to determine / Unknown  -- Refer to Clinical Documentation Reviewer    PROVIDER RESPONSE TEXT:    This patient has tremors, unrelated to Parkinson's disease.     Query created by: Kacy García on 12/13/2021 8:31 AM      Electronically signed by:  Roni Pierre MD 12/14/2021 1:45 PM

## 2021-12-15 ENCOUNTER — TELEPHONE (OUTPATIENT)
Dept: INTERNAL MEDICINE CLINIC | Age: 51
End: 2021-12-15

## 2021-12-15 NOTE — TELEPHONE ENCOUNTER
----- Message from Elena Kumari MA sent at 12/15/2021 12:49 PM EST -----  Subject: Appointment Request    Reason for Call: Routine Hospital Follow Up    QUESTIONS  Type of Appointment? Established Patient  Reason for appointment request? Available appointments did not meet   patient need  Additional Information for Provider? Pt was admitted to the hospital on   12/1 and discharged 12/5, reports he was admitted for having seizures. States he was never scheduled a follow up upon release and is just now   calling to make the follow up appt. as he reported that his wife told him   to wait cause she is his transportation and she has not been able to bring   him in. Pt can be reached at number below to schedule appt accordingly. Thank you so much!   ---------------------------------------------------------------------------  --------------  CALL BACK INFO  What is the best way for the office to contact you? OK to leave message on   voicemail  Preferred Call Back Phone Number? [phone]  ---------------------------------------------------------------------------  --------------  SCRIPT ANSWERS  Relationship to Patient? Self  (Patient requests to see provider urgently. )? No  (Has the patient been discharged from the hospital within 2 business days   AND does not have a Telephone Encounter  Follow Up From [address] [place] [address]   documented in [address]?)? No  Do you have any questions for your primary care provider that need to be   answered prior to your appointment? (Use RN Triage if question pertains to   anything on the red flag list)? No  (Patient needs follow up visit after hospital discharge) Book first   available appointment within 7 days OF DISCHARGE, if no appt, proceed to   book the next available time slot within 14 days OF DISCHARGE AND Send   Message to Provider. [address] Follow Up appointment cannot be booked   beyond 14 Days and should result in a Message to Provider. ?  Yes   Have you been diagnosed with, awaiting test results for, or told that you   are suspected of having COVID-19 (Coronavirus)? (If patient has tested   negative or was tested as a requirement for work, school, or travel and   not based on symptoms, answer no)? No  Within the past two weeks have you developed any of the following symptoms   (answer no if symptoms have been present longer than 2 weeks or began   more than 2 weeks ago)? Fever or Chills, Cough, Shortness of breath or   difficulty breathing, Loss of taste or smell, Sore throat, Nasal   congestion, Sneezing or runny nose, Fatigue or generalized body aches   (answer no if pain is specific to a body part e.g. back pain), Diarrhea,   Headache? No  Have you had close contact with someone with COVID-19 in the last 14 days? No  (Service Expert  click yes below to proceed with [employer] As Usual   Scheduling)?  Yes

## 2021-12-16 ENCOUNTER — TELEPHONE (OUTPATIENT)
Dept: WOUND CARE | Age: 51
End: 2021-12-16

## 2021-12-16 ENCOUNTER — OFFICE VISIT (OUTPATIENT)
Dept: INTERNAL MEDICINE CLINIC | Age: 51
End: 2021-12-16
Payer: MEDICARE

## 2021-12-16 VITALS
TEMPERATURE: 99.9 F | HEIGHT: 74 IN | SYSTOLIC BLOOD PRESSURE: 110 MMHG | BODY MASS INDEX: 33.98 KG/M2 | DIASTOLIC BLOOD PRESSURE: 70 MMHG | HEART RATE: 82 BPM | WEIGHT: 264.8 LBS

## 2021-12-16 DIAGNOSIS — E08.621 DIABETIC ULCER OF MIDFOOT ASSOCIATED WITH DIABETES MELLITUS DUE TO UNDERLYING CONDITION, LIMITED TO BREAKDOWN OF SKIN, UNSPECIFIED LATERALITY (HCC): ICD-10-CM

## 2021-12-16 DIAGNOSIS — M79.89 LEG SWELLING: ICD-10-CM

## 2021-12-16 DIAGNOSIS — L97.401 DIABETIC ULCER OF MIDFOOT ASSOCIATED WITH DIABETES MELLITUS DUE TO UNDERLYING CONDITION, LIMITED TO BREAKDOWN OF SKIN, UNSPECIFIED LATERALITY (HCC): ICD-10-CM

## 2021-12-16 DIAGNOSIS — L98.491 CHRONIC SKIN ULCER, LIMITED TO BREAKDOWN OF SKIN (HCC): Primary | ICD-10-CM

## 2021-12-16 DIAGNOSIS — R19.7 DIARRHEA, UNSPECIFIED TYPE: ICD-10-CM

## 2021-12-16 DIAGNOSIS — Z79.4 TYPE 2 DIABETES MELLITUS WITH DIABETIC POLYNEUROPATHY, WITH LONG-TERM CURRENT USE OF INSULIN (HCC): ICD-10-CM

## 2021-12-16 DIAGNOSIS — E11.42 TYPE 2 DIABETES MELLITUS WITH DIABETIC POLYNEUROPATHY, WITH LONG-TERM CURRENT USE OF INSULIN (HCC): ICD-10-CM

## 2021-12-16 PROBLEM — S91.309A OPEN WOUND OF HEEL: Status: ACTIVE | Noted: 2021-12-16

## 2021-12-16 PROBLEM — G93.41 METABOLIC ENCEPHALOPATHY: Status: ACTIVE | Noted: 2021-12-16

## 2021-12-16 PROBLEM — F44.4 FUNCTIONAL NEUROLOGICAL SYMPTOM DISORDER WITH ABNORMAL MOVEMENT: Status: ACTIVE | Noted: 2021-04-30

## 2021-12-16 PROBLEM — K52.9 INFLAMMATORY BOWEL DISEASE: Status: ACTIVE | Noted: 2021-12-16

## 2021-12-16 PROCEDURE — 1111F DSCHRG MED/CURRENT MED MERGE: CPT | Performed by: STUDENT IN AN ORGANIZED HEALTH CARE EDUCATION/TRAINING PROGRAM

## 2021-12-16 PROCEDURE — 99214 OFFICE O/P EST MOD 30 MIN: CPT | Performed by: STUDENT IN AN ORGANIZED HEALTH CARE EDUCATION/TRAINING PROGRAM

## 2021-12-16 RX ORDER — CEPHALEXIN 500 MG/1
500 CAPSULE ORAL 4 TIMES DAILY
Qty: 40 CAPSULE | Refills: 0 | Status: SHIPPED | OUTPATIENT
Start: 2021-12-16 | End: 2021-12-26

## 2021-12-16 RX ORDER — SULFAMETHOXAZOLE AND TRIMETHOPRIM 800; 160 MG/1; MG/1
1 TABLET ORAL 2 TIMES DAILY
Qty: 20 TABLET | Refills: 0 | Status: SHIPPED | OUTPATIENT
Start: 2021-12-16 | End: 2021-12-26

## 2021-12-16 RX ORDER — ARIPIPRAZOLE 5 MG/1
10 TABLET ORAL DAILY
COMMUNITY
Start: 2021-12-10 | End: 2022-06-29 | Stop reason: DRUGHIGH

## 2021-12-16 RX ORDER — GREEN TEA/HOODIA GORDONII 315-12.5MG
1 CAPSULE ORAL DAILY
Qty: 30 TABLET | Refills: 0 | Status: SHIPPED | OUTPATIENT
Start: 2021-12-16 | End: 2022-01-15

## 2021-12-16 ASSESSMENT — ENCOUNTER SYMPTOMS
SHORTNESS OF BREATH: 0
WHEEZING: 0
DIARRHEA: 1
COUGH: 0
NAUSEA: 0
VOMITING: 0

## 2021-12-16 NOTE — PATIENT INSTRUCTIONS
Please call the number ([phone]) tomorrow and schedule an appointment to have vascular ultrasound imaging done.

## 2021-12-16 NOTE — PROGRESS NOTES
Post-Discharge Transitional Care Management Services or Hospital Follow Up      Minh Jansen   YOB: 1970    Date of Office Visit:  12/16/2021  Date of Hospital Admission: 12/1/21  Date of Hospital Discharge: 12/5/21  Readmission Risk Score(high >=14%.  Medium >=10%):Readmission Risk Score: 20.1 ( )    Care management risk score Rising risk (score 2-5) and Complex Care (Scores >=6): 5     Non face to face  following discharge, date last encounter closed (first attempt may have been earlier): *No documented post hospital discharge outreach found in the last 14 days *No documented post hospital discharge outreach found in the last 14 days    Call initiated 2 business days of discharge: *No response recorded in the last 14 days     Patient Active Problem List   Diagnosis    Depression    Hypertension    Liver disease    Restless legs syndrome    Chronic back pain    Other chest pain    Irritable bowel syndrome    Non compliance w medication regimen    Hypertension associated with diabetes ([address]    Hyperlipidemia with target LDL less than 100    IBS (irritable bowel syndrome)    Sinus tachycardia    Confusion    Encephalopathy    Diabetes mellitus type II, uncontrolled ([address]    Dizziness    Neuropathy    Hyperammonemia ([address]    Medication overdose, insulin    Suicide attempt ([address]    Hypoglycemia    Hypokalemia    Lithium toxicity    Nausea & vomiting    Snoring    Hypogonadism    Erectile dysfunction due to diseases classified elsewhere    Rash    Headaches due to old head injury    Abdominal wall pain in left lower quadrant    Bipolar affective disorder ([address]    Hypogonadism in male    Type 2 diabetes mellitus with diabetic neuropathy (HCC)    Hypomagnesemia    Partial seizure ([address]    Syncope and collapse    Headache disorder    Partial symptomatic epilepsy with complex partial seizures, not intractable, without status epilepticus ([address]    Respiratory acidosis  Essential hypertension    Gastritis    Gastroesophageal reflux disease    Mixed hyperlipidemia    Acute lateral meniscus tear of right knee    Recurrent right inguinal hernia    Bicipital tendinitis of right shoulder    Carpal tunnel syndrome of right wrist    Obesity (BMI 30-39. 9)    Lump of axilla    Familial hypercholesterolemia    Exertional dyspnea    Vitamin D deficiency    Seizure (HCC)    Nausea and vomiting    Type II diabetes mellitus with manifestations, uncontrolled (HCC)    Chondromalacia of knee, right    Effusion of left knee    Elevated levels of transaminase & lactic acid dehydrogenase    Other intervertebral disc degeneration, lumbar region    Palpitations    Splenomegaly    Sprain of right foot    Steatohepatitis    Osteopenia    Obstructive sleep apnea    Follicular neoplasm of thyroid    Nasal obstruction    Nasal septal deviation    Chest pain    MISTI (obstructive sleep apnea)    Acute post-operative pain    Bipolar 1 disorder, depressed (HCC)    Status post uvulopalatopharyngoplasty    Status post partial thyroidectomy    Wound infection    Diabetic leg ulcer (HCC)    Seizures (HCC)    Partial symptomatic epilepsy with complex partial seizures, intractable, without status epilepticus (HCC)    Episodic confusion    Acute metabolic encephalopathy    MRSA infection within last 3 months    Nonhealing nonsurgical wound    Breakthrough seizure ([address]    Partial seizures ([address]    Tremors of nervous system    Functional neurological symptom disorder with abnormal movement    Open wound of heel    Inflammatory bowel disease    Metabolic encephalopathy       Allergies   Allergen Reactions    Ciprofloxacin-Ciproflox Hcl Er Other (See Comments)     Elevated creatinine    Heparin      Monitor for thrombocytopenia    Metformin Nausea Only     Abdominal pain, diarrhea    Niacin And Related Other (See Comments)     Burning sensation, severe flushing    Tramadol Hcl      Contraindication to seizure medications    Ultram [Tramadol]      Contraindication to seizure medications    Warfarin      Very difficult to regulate in past       Medications listed as ordered at the time of discharge from hospital     Medication List          Accurate as of December 16, 2021  6:19 PM. If you have any questions, ask your nurse or doctor.             START taking these medications    cephALEXin 500 MG capsule  Commonly known as: KEFLEX  Take 1 capsule by mouth 4 times daily for 10 days  Started by: Mckenzie Tang MD     Probiotic Acidophilus Tabs  Take 1 tablet by mouth daily  Started by: Mckenzie Tang MD     sulfamethoxazole-trimethoprim 800-160 MG per tablet  Commonly known as: BACTRIM DS;SEPTRA DS  Take 1 tablet by mouth 2 times daily for 10 days  Started by: Mckenzie Tang MD        CHANGE how you take these medications    lactulose 10 GM/15ML solution  Commonly known as: CHRONULAC  Take 15 mLs by mouth 3 times daily  What changed: how much to take     vitamin D 1.25 MG (98936 UT) Caps capsule  Commonly known as: ERGOCALCIFEROL  Take 1 capsule by mouth once a week  What changed: additional instructions     zonisamide 100 MG capsule  Commonly known as: ZONEGRAN  Take 5 capsules by mouth nightly  What changed: how much to take        CONTINUE taking these medications    ARIPiprazole 5 MG tablet  Commonly known as: ABILIFY     Basaglar KwikPen 100 UNIT/ML injection pen  Generic drug: insulin glargine     DULoxetine 60 MG extended release capsule  Commonly known as: CYMBALTA     gabapentin 600 MG tablet  Commonly known as: NEURONTIN     insulin lispro 100 UNIT/ML injection vial  Commonly known as: HUMALOG     Jardiance 25 MG tablet  Generic drug: empagliflozin  Take 25 mg by mouth daily     levETIRAcetam 1000 MG tablet  Commonly known as: KEPPRA  Take 2 tablets by mouth 2 times daily     LORazepam 0.5 MG tablet  Commonly known as: ATIVAN     metoprolol tartrate 25 MG tablet  Commonly known as: LOPRESSOR  Take 1 tablet by mouth 2 times daily     OneTouch Ultra strip  Generic drug: blood glucose test strips  1 each by In Vitro route 3 times daily DX: E11.65 Dispense Onetouch Ultra 2 test strips     pantoprazole 40 MG tablet  Commonly known as: Protonix  Take 1 tablet by mouth 2 times daily     primidone 50 MG tablet  Commonly known as:  MYSOLINE  Take 0.5 tablets by mouth nightly     promethazine 12.5 MG tablet  Commonly known as: PHENERGAN  Take 1-2 tablets by mouth every 8 hours as needed for Nausea     rOPINIRole 1 MG tablet  Commonly known as: REQUIP  TAKE 1 TABLET BY MOUTH THREE TIMES A DAY     sildenafil 100 MG tablet  Commonly known as: Viagra  Take 1 tablet by mouth as needed for Erectile Dysfunction     sucralfate 1 GM/10ML suspension  Commonly known as: CARAFATE     Vimpat 100 MG Tabs tablet  Generic drug: lacosamide     vitamin B-12 1000 MCG tablet  Commonly known as: CYANOCOBALAMIN           Where to Get Your Medications      These medications were sent to [address] [place] [address] [address] [place] [address], [place] - [address] - P [phone] - F [phone] [address] [place] [address]    Phone: [phone]   · cephALEXin 500 MG capsule  · Probiotic Acidophilus Tabs  · sulfamethoxazole-trimethoprim 800-160 MG per tablet         Medications marked \"taking\" at this time  Outpatient Medications Marked as Taking for the 12/16/21 encounter (Office Visit) with Karime Hobson MD   Medication Sig Dispense Refill    ARIPiprazole (ABILIFY) 5 MG tablet       cephALEXin (KEFLEX) 500 MG capsule Take 1 capsule by mouth 4 times daily for 10 days 40 capsule 0    sulfamethoxazole-trimethoprim (BACTRIM DS;SEPTRA DS) 800-160 MG per tablet Take 1 tablet by mouth 2 times daily for 10 days 20 tablet 0    Probiotic Acidophilus (FLORANEX) TABS Take 1 tablet by mouth daily 30 tablet 0    primidone (MYSOLINE) 50 MG tablet Take 0.5 tablets by mouth nightly 30 tablet 0    vitamin B-12 (CYANOCOBALAMIN) 1000 MCG tablet Take 1,000 mcg by mouth 2 times daily       promethazine (PHENERGAN) 12.5 MG tablet Take 1-2 tablets by mouth every 8 hours as needed for Nausea 60 tablet 1    insulin glargine (BASAGLAR KWIKPEN) 100 UNIT/ML injection pen Inject 55 Units into the skin 2 times daily       DULoxetine (CYMBALTA) 60 MG extended release capsule Take 120 mg by mouth daily      metoprolol tartrate (LOPRESSOR) 25 MG tablet Take 1 tablet by mouth 2 times daily 60 tablet 5    gabapentin (NEURONTIN) 600 MG tablet Take 600 mg by mouth 3 times daily.  blood glucose test strips (ONETOUCH ULTRA) strip 1 each by In Vitro route 3 times daily DX: E11.65 Dispense Onetouch Ultra 2 test strips 300 each 3    levETIRAcetam (KEPPRA) 1000 MG tablet Take 2 tablets by mouth 2 times daily 60 tablet 3    zonisamide (ZONEGRAN) 100 MG capsule Take 5 capsules by mouth nightly (Patient taking differently: Take 600 mg by mouth nightly ) 30 capsule 3    lactulose (CHRONULAC) 10 GM/15ML solution Take 15 mLs by mouth 3 times daily (Patient taking differently: Take 45 mLs by mouth 3 times daily ) 2 Bottle 1    insulin lispro (HUMALOG) 100 UNIT/ML injection vial Takes 10 units with meals plus group 2 sliding scale      pantoprazole (PROTONIX) 40 MG tablet Take 1 tablet by mouth 2 times daily 180 tablet 1    vitamin D (ERGOCALCIFEROL) 1.25 MG (86910 UT) CAPS capsule Take 1 capsule by mouth once a week (Patient taking differently: Take 50,000 Units by mouth once a week Monday) 12 capsule 1    rOPINIRole (REQUIP) 1 MG tablet TAKE 1 TABLET BY MOUTH THREE TIMES A  tablet 1    lacosamide (VIMPAT) 100 MG TABS tablet Take 200 mg by mouth 2 times daily.        sildenafil (VIAGRA) 100 MG tablet Take 1 tablet by mouth as needed for Erectile Dysfunction 45 tablet 3      Medications patient taking as of now reconciled against medications ordered at time of hospital discharge: Yes    Chief Complaint   Patient presents with    Leg Pain     right calf pain , swelling    Follow-Up from Hospital     f/u [facility] d/c 12/5 - seizure     Other     on bottom of right foot     HPI    Inpatient course: Discharge summary reviewed- see chart. Interval history/Current status: Patient was recently admitted at [facility] in [place], [place] by the neurology service from 12/1/2021 to 12/5/2021. Patient was there for an elective LTME and medication taper. On review of the discharge summary patient did not have any recurrence of seizure-like activity during his stay. His Depakote was discontinued and he was instructed to continue with Keppra, Vimpat, Zonisamide and Ativan for seizure control. Patient has a follow-up appointment with the neurology group at [facility] later this month on 12/23/2021. Today he reports that for the last 3 days he has had RLE swelling and pain. Patient's history is notable for recurrent DVT/PE and he had an IVC filter placed in 2005. He was previously anticoagulated with Coumadin however he reports that this was discontinued due to supratherapeutic INR that required a type of plasma infusion, possibly FFP. Since then he does not appear to be on any anticoagulation with Coumadin or DOAC's. He states his wife is a ED nurse and thought that he had a DVT. Patient does have a notable history of multiple diabetic foot ulcers and infections. Has DM 2 and severe peripheral neuropathy. He was recently hospitalized here at [facility] for 2 days in November 2021 for breakthrough seizures. Was noted at that time that he had right lateral surface lower extremity chronic diabetic ulcers with MRSA infection. At that time he was already on Keflex by the wound clinic however this was changed to clindamycin to be taken for 10 days on discharge. Patient reports that he was compliant and finished his medications.  Furthermore he states that prior to the swelling and tenderness of the RLE, he scraped the bottom of his right foot on his hardwood floor immediately started experiencing pain. Reports the pain is mostly in the medial portion of his right foot and radiates upwards when he walks. Denies any fevers or chills. Reported being nonambulatory most times since returning from [place]. Review of Systems   Constitutional: Positive for activity change. Negative for chills, diaphoresis and fever. Respiratory: Negative for cough, shortness of breath and wheezing. Cardiovascular: Positive for leg swelling (RLE). Negative for chest pain and palpitations. Gastrointestinal: Positive for diarrhea (chronic due to IBS). Negative for nausea and vomiting. Genitourinary: Negative for difficulty urinating. Musculoskeletal: Negative for arthralgias and myalgias. Skin: Positive for wound (plantar surface of right foot). Neurological: Positive for tremors. Negative for dizziness, seizures, syncope, weakness and headaches. Hematological: Negative for adenopathy. Does not bruise/bleed easily. Vitals:    12/16/21 1419   BP: 110/70   Site: Left Upper Arm   Position: Sitting   Cuff Size: Medium Adult   Pulse: 82   Temp: 99.9 °F (37.7 °C)   Weight: 264 lb 12.8 oz (120.1 kg)   Height: 6' 2\" (1.88 m)     Body mass index is 34 kg/m². Wt Readings from Last 3 Encounters:   12/16/21 264 lb 12.8 oz (120.1 kg)   12/01/21 266 lb 12.8 oz (121 kg)   11/16/21 262 lb 12.8 oz (119.2 kg)     BP Readings from Last 3 Encounters:   12/16/21 110/70   12/05/21 117/74   11/16/21 120/73     Physical Exam  Vitals reviewed. Constitutional:       General: He is not in acute distress. Appearance: Normal appearance. He is obese. He is not ill-appearing. HENT:      Head: Normocephalic and atraumatic. Nose: Nose normal. No congestion or rhinorrhea. Eyes:      General: No scleral icterus. Right eye: No discharge. Left eye: No discharge. Extraocular Movements: Extraocular movements intact.       Conjunctiva/sclera: Conjunctivae normal. Cardiovascular:      Rate and Rhythm: Normal rate and regular rhythm. Pulses: Normal pulses. Dorsalis pedis pulses are 2+ on the right side. Heart sounds: Normal heart sounds. Pulmonary:      Effort: Pulmonary effort is normal. No respiratory distress. Breath sounds: Normal breath sounds. Abdominal:      General: Abdomen is flat. Bowel sounds are normal. There is no distension. Palpations: Abdomen is soft. Tenderness: There is no abdominal tenderness. Musculoskeletal:      Cervical back: Normal range of motion and neck supple. Right lower leg: No swelling (minor swelling around the ankle only). No edema. Left lower leg: No swelling. No edema. Right ankle: Swelling present. No ecchymosis. Left ankle: Normal.      Right foot: Laceration present. Normal pulse. Left foot: Normal.        Feet:    Skin:     General: Skin is warm and dry. Capillary Refill: Capillary refill takes less than 2 seconds. Findings: Wound (multiple delayed healing wounds in the RLE) present. Neurological:      General: No focal deficit present. Mental Status: He is alert and oriented to person, place, and time. Psychiatric:         Attention and Perception: Attention and perception normal.         Mood and Affect: Mood normal. Affect is flat. Speech: Speech normal. Delayed: minorly. Behavior: Behavior normal. Behavior is cooperative. Thought Content: Thought content normal.         Cognition and Memory: Cognition and memory normal.         Judgment: Judgment normal.       Assessment/Plan:  1. Chronic skin ulcer, limited to breakdown of skin [facility])  -Will refer patient to [address] further care of RLE diabetic wounds. Particularly for the open wound on the plantar surface of the right foot.     2. Diabetic ulcer of midfoot associated with diabetes mellitus due to underlying condition, limited to breakdown of skin, unspecified laterality ([facility] [address]  - Will prescribe Keflex 500 mg 4x/day and Bactrim 800-160 mg BID. Patient will take both for 10 days total duration.   - While patient has a 2+ palpable DP artery in the right foot, there needs to further vascular workup done to fully rule out PVD as a cause of his delayed healing diabetic wounds. Patient was given a number and instructed to call tomorrow to make an appointment for vascular imaging studies that he qualifies for to be paid out of pocket. Patient agreed and verbalized understanding for the need of these studies. 3. Leg swelling  Patient has minimal swelling around the ankle of the RLE only. Further examination shows no further edema/swelling the RLE and is symmetrical with the LLE. There is tenderness to touch that starts on the medial aspect of the right ankle which radiates upward toward the knee as stated by the patient. Patient has a notable history of prior DVT/PE and has had a IVC filter since 2005. Not on anticoagulation currently. History of thyroid cancer. Reports being mostly nonambulatory since discharge from [place] [address]. - Patient's wells criteria score is 2. His right ankle swelling and pain seems to have occurred only 3 days ago after he had wounded the bottom of his right foot on his floor.  - Will obtain D-Dimer. If elevated, and given patient's thrombotic history, will obtain VL Duplex US scan of the RLE and treat if indicated. 4. Type 2 diabetes mellitus with diabetic polyneuropathy, with long-term current use of insulin (HCC)  Last A1c measured 7.4% on 11/13/2021 improved from previous level of 8.3% measured in July 2021. Patient reports a history of severe polyneuropathy. Reports compliance with insulin. 5. Diarrhea, unspecified type  Patient reports chronic diarrhea due to his IBS. Also reports a prior history of having C. difficile diarrhea.   - Probiotic Acidophilus (FLORANEX) TABS; Take 1 tablet by mouth daily  Dispense: 30 tablet; Refill: 0. Patient will be prophylactically treated while he is taking both Keflex and Bactrim for his diabetic wounds. Medical Decision Making: moderate complexity     Disposition: Patient will follow up with meMason MD in 4 weeks on 1/13/2022 further assessment of his chronic medical conditions and diabetic wounds.     Case and plan discussed with Dr. Shay Rahman MD.    Mason Teixeira MD   Internal Medicine PGY-2

## 2021-12-16 NOTE — TELEPHONE ENCOUNTER
Called patient to reschedule missed appointment. Call answered by patients family.  She stated that he was not home and would call us back to reschedule

## 2021-12-17 ENCOUNTER — NURSE ONLY (OUTPATIENT)
Dept: LAB | Age: 51
End: 2021-12-17

## 2021-12-17 DIAGNOSIS — M79.89 LEG SWELLING: ICD-10-CM

## 2021-12-17 LAB
ANION GAP SERPL CALCULATED.3IONS-SCNC: 14 MEQ/L (ref 8–16)
BUN BLDV-MCNC: 11 MG/DL (ref 7–22)
CALCIUM SERPL-MCNC: 8.7 MG/DL (ref 8.5–10.5)
CHLORIDE BLD-SCNC: 101 MEQ/L (ref 98–111)
CO2: 23 MEQ/L (ref 23–33)
CREAT SERPL-MCNC: 0.9 MG/DL (ref 0.4–1.2)
D-DIMER QUANTITATIVE: < 215 NG/ML FEU (ref 0–500)
GFR SERPL CREATININE-BSD FRML MDRD: 89 ML/MIN/1.73M2
GLUCOSE BLD-MCNC: 357 MG/DL (ref 70–108)
POTASSIUM SERPL-SCNC: 4 MEQ/L (ref 3.5–5.2)
SODIUM BLD-SCNC: 138 MEQ/L (ref 135–145)

## 2021-12-21 DIAGNOSIS — G25.81 RESTLESS LEGS SYNDROME (RLS): ICD-10-CM

## 2021-12-21 NOTE — PROGRESS NOTES
Attending supervising physicians attestation statement:      I was present with the resident physician during the history and exam.  I Discussed the findings and plans with the resident physician  personally   After examining the patient, and agree with the resident'S note as outlined . Labs will be obtained soon, hx of DM and diabetic ulcer of the foot.        Electronically signed by Daina Harkins MD on 12/21/2021 at 9:53 AM

## 2021-12-23 ENCOUNTER — VIRTUAL VISIT (OUTPATIENT)
Dept: NEUROLOGY | Age: 51
End: 2021-12-23
Payer: MEDICARE

## 2021-12-23 DIAGNOSIS — R25.1 TREMORS OF NERVOUS SYSTEM: ICD-10-CM

## 2021-12-23 DIAGNOSIS — R56.9 SEIZURES (HCC): Primary | ICD-10-CM

## 2021-12-23 DIAGNOSIS — R56.9 PARTIAL SEIZURES (HCC): ICD-10-CM

## 2021-12-23 PROCEDURE — 1111F DSCHRG MED/CURRENT MED MERGE: CPT | Performed by: NURSE PRACTITIONER

## 2021-12-23 PROCEDURE — 3017F COLORECTAL CA SCREEN DOC REV: CPT | Performed by: NURSE PRACTITIONER

## 2021-12-23 PROCEDURE — 99214 OFFICE O/P EST MOD 30 MIN: CPT | Performed by: NURSE PRACTITIONER

## 2021-12-23 PROCEDURE — G8427 DOCREV CUR MEDS BY ELIG CLIN: HCPCS | Performed by: NURSE PRACTITIONER

## 2021-12-23 RX ORDER — GABAPENTIN 600 MG/1
600 TABLET ORAL 4 TIMES DAILY
Qty: 360 TABLET | Refills: 0 | Status: SHIPPED | OUTPATIENT
Start: 2021-12-23 | End: 2022-04-26

## 2021-12-23 RX ORDER — ROPINIROLE 1 MG/1
TABLET, FILM COATED ORAL
Qty: 270 TABLET | Refills: 1 | Status: SHIPPED | OUTPATIENT
Start: 2021-12-23 | End: 2022-06-07 | Stop reason: SDUPTHER

## 2021-12-23 RX ORDER — PRIMIDONE 50 MG/1
50 TABLET ORAL 3 TIMES DAILY
Qty: 90 TABLET | Refills: 3 | Status: SHIPPED | OUTPATIENT
Start: 2021-12-23 | End: 2022-04-26

## 2021-12-23 RX ORDER — LORAZEPAM 0.5 MG/1
0.5 TABLET ORAL DAILY PRN
Qty: 30 TABLET | Refills: 0 | Status: SHIPPED | OUTPATIENT
Start: 2021-12-23 | End: 2022-01-22

## 2021-12-23 NOTE — PROGRESS NOTES
[facility]            Isamar Hernandez[address]          [facility], [address] [facility]          Dept: [phone]          Dept Fax: [phone]    MD Kurt Tomas MD Ahmed B. Gerrianne Rede, MD Kareen Flor, MD Alpheus Hides, [facility]     TELEHEALTH VISIT       12/23/2021      HISTORY OF PRESENT ILLNESS:       I had the pleasure of seeing Tabatha Ordaz, who returns for continuing neurologic care. The patient was seen last on November 4, 2021 for treatment of seizure disorder associated with right upper extremities tremor. His recent hospitalization records were reviewed. The patient has a seizure disorder, currently having 1-2 seizures/week. Previous medication trials include: dilantin and lamictal. The patient is also having daily episodes of right upper extremities tremor that is suspected to be a possible focal seizure. The patient completed a LTME on 12/5/2021 to rule out subclinical seizures. The LTME completed at this time showed patient's jerking movements had no EEG correlate. It was thought that he more likely had a movement disorder. He was given names of movement disorder specialists, however has not yet made an appointment. For seizure prophylaxis, he was prescribed depakote 250 mg twice daily that was increased during his hospitalization. During his hospitalization he was found to be hyperammonemic and the Depakote was stopped. At this time, the patient was prescribed keppra 2000 mg twice daily, vimpat 200 mg twice daily, zonegran 500 mg at bedtime daily, and primidone 25 mg at bedtime daily. The patient is here today reporting there is no change in his right upper extremities tremor. This can happen several times a day and he states that it is painful. The patient reports various seizures associated with his eyes rolling and vomiting, as well as complete head numbness and a pins and needles sensation.  The patient states it various 0.56* 0.60*   GLUCOSE 182* 142* 165*                      Lab Results   Component Value Date     CHOL 147 12/29/2020     HDL 26 12/29/2020     TRIG 246 (H) 04/06/2021     ALT 46 (H) 07/29/2021     AST 22 07/29/2021     TSH 1.72 07/28/2021     INR 1.08 01/23/2021     GLUF 283 (H) 10/17/2015     LABA1C 8.3 (H) 07/20/2021     LABMICR < 1.20 12/29/2020     EPXPTLGB88 768 04/21/2021               Diagnostic data reviewed:       ammonia level:               PAST MEDICAL HISTORY:         Diagnosis Date    Abnormal thyroid biopsy     s/p left hemithyroidectomy 1/8300 - follicular adenoma    Acid reflux     Anemia     resolved - previously seen by Dr. Charlotte Saez (due to enlarged spleen)    Anesthesia     seizures with brain surgery due to blood sugar got really high    Bile reflux gastritis     per EGD 11/16/21 - Dr. Mcknight Duty - to start Carafate.  Bipolar disorder ([facility]     Blood clot in vein 04/07/2016    [facility]) 04/2019    thyroid    Chest pain     previously seeing [facility] cardiologist, now seeing Dr. Rehan Daley (LAD bridging on cath 4/2016)    Chronic back pain     Chronic bronchitis ([facility]     Dr. Ben Zhang Colon polyp 08/30/2016    Depression     seeing Mateusz Garcia DVT (deep venous thrombosis) ([facility] [id number]    not on Coumadin due to unable to regulate - Dr. Hudson - no etiology found per patient    Esophageal abnormality     nodule     Fatty liver disease, nonalcoholic     U/S 41/12[facility]    Furuncle     legs    History of Doppler ultrasound 05/29/2011    No hemodynamically significant carotid stenosis is identified. A thyroid nodule on each side. Dedicated ultrasound of thyroid gland is suggested to further evaluate if clinically indicated.      History of pulmonary embolism 2007    s/p GFF, related to knee surgery    Hx of blood clots     leg and lung    Hyperlipidemia     severely elevated triglycerides    Hypertension     diastolic    Intracranial arachnoid cyst 11/2012     Ruiz drained, complicated with seizures, DKA    Irritable bowel syndrome     Liver disease     Gilma Sam - elevated LFT - positive smooth muscle antibody, steatosis per liver bx 3/2014 with Dr. Lonnie Ames (multiple drug resistant organisms) resistance 2012    MRSA (methicillin resistant staph aureus) culture positive     h/o in foot and before brain surgery    Nephrolithiasis     noted on CT abdomen 9/2016, 6/2019    Neuropathy     Pancreatic insufficiency     Positive SANDY (antinuclear antibody)     Dr. Apolonia Lees - first visit in 6/2013    Prolonged emergence from general anesthesia     Restless legs syndrome     Seizures ([address]     started with brain surgery    Sinus tachycardia     Holter 3/2013 - seeing Dr. Kaushik Marinelli Skull fracture [facility])     clips     Sleep apnea     positive sleep apnea per study 10/2020.  Systolic dysfunction     \"systolic bridge\"  EF 09% ECHO 9/2019    Type II or unspecified type diabetes mellitus without mention of complication, not stated as uncontrolled 2007        PAST SURGICAL HISTORY:         Procedure Laterality Date    CARDIAC CATHETERIZATION      2011,5-6 years ago   [address]  3-2012   [address]  04/28/2016    [facility]     CARPAL TUNNEL RELEASE  01/2017    CHOLECYSTECTOMY  2004    COLONOSCOPY  2012    COLONOSCOPY  08/2016    2 tubular adenomas - reportedly needs repeat scope in 6 months    CRANIOTOMY  11/2012    arachnoid cyst drainage    EKG 12-LEAD  10/18/2015         ENDOSCOPY, COLON, DIAGNOSTIC      HERNIA REPAIR Right 1996    [facility]--Dr. Jossie Su INGUINAL HERNIA REPAIR Right 09/15/2016    Robotic assisted    KNEE ARTHROSCOPY Right 2016    KNEE SURGERY Left 2007    acl and debrided twice    OTHER SURGICAL HISTORY  5-31-11    Tilt table was associated w/ nonspecific symptoms or nausea, otherwise no significant dizziness or syncope. No significant hemodynamic changes.  Otherwise, unremarkable tilt table test after 30 minutes of tilting.  OTHER SURGICAL HISTORY  01/20/2017    RIGHT SHOULDER ARTHROSCOPY, OPEN STAN, OPEN ACROMIOPLASTY, BICEP TENDESIS, RIGHT CARPAL TUNNEL RELEASE    SHOULDER SURGERY Right 01/2007    THYROID LOBECTOMY N/A 1/15/2021    THYROID LOBECTOMY, UVULOPALATOPHARYNGOPLAST LAOP performed by Aida Cruz MD at [facility] ECHOCARDIOGRAM  3-05-11    LV size and systolic function normal. EF 55-65%.      UPPER GASTROINTESTINAL ENDOSCOPY  2012    UPPER GASTROINTESTINAL ENDOSCOPY  2016    Dr. Liz Stiles Left 10/22/2019    EGD DILATION SAVORY performed by Carlos A Pryor MD at [address] [facility] ENDOSCOPY Left 10/22/2019    EGD BIOPSY performed by Carlos A Pryor MD at [facility]    UPPER GASTROINTESTINAL ENDOSCOPY N/A 11/16/2021    EGD performed by Carlos A Pryor MD at [facility] ENDOSCOPY Left 11/16/2021    EGD BIOPSY performed by [facility] A [facility]or MD [facility] [place]   [facility]        SOCIAL HISTORY:     Social History     Socioeconomic History    Marital status:      Spouse name: Not on file    Number of children: 3    Years of education: Not on file    Highest education level: Not on file   Occupational History    Occupation: Disability since 2011   Tobacco Use    Smoking status: Never Smoker    Smokeless tobacco: Never Used   Vaping Use    Vaping Use: Never used   Substance and Sexual Activity    Alcohol use: No     Alcohol/week: 0.0 standard drinks    Drug use: No    Sexual activity: Not on file   Other Topics Concern    Not on file   Social History Narrative    Not on file     Social Determinants of Health     Financial Resource Strain: Medium Risk    Difficulty of Paying Living Expenses: Somewhat hard   Food Insecurity: No Food Insecurity    Worried About Running Out of Food in the Last Year: Never true    [employer]ry of [employer] in the Last Year: Never true   Transportation Needs:     Lack of Transportation (Medical): Not on file    Lack of Transportation (Non-Medical): Not on file   Physical Activity:     Days of Exercise per Week: Not on file    Minutes of Exercise per Session: Not on file   Stress:     Feeling of Stress : Not on file   Social Connections:     Frequency of Communication with Friends and Family: Not on file    Frequency of Social Gatherings with Friends and Family: Not on file    Attends [employer] Services: Not on file    Active Member of [address] [place] [address] [employer] or Organizations: Not on file    Attends Club or Organization Meetings: Not on file    Marital Status: Not on file   Intimate Partner Violence:     Fear of Current or Ex-Partner: Not on file    Emotionally Abused: Not on file    Physically Abused: Not on file    Sexually Abused: Not on file   Housing Stability:     Unable to Pay for Housing in the Last Year: Not on file    Number of [place] in the Last Year: Not on file    Unstable Housing in the Last Year: Not on file       CURRENT MEDICATIONS:     Current Outpatient Medications   Medication Sig Dispense Refill    primidone (MYSOLINE) 50 MG tablet Take 1 tablet by mouth 3 times daily 90 tablet 3    LORazepam (ATIVAN) 0.5 MG tablet Take 1 tablet by mouth daily as needed (breakthrough seizures) for up to 30 days.  30 tablet 0    ARIPiprazole (ABILIFY) 5 MG tablet       cephALEXin (KEFLEX) 500 MG capsule Take 1 capsule by mouth 4 times daily for 10 days 40 capsule 0    sulfamethoxazole-trimethoprim (BACTRIM DS;SEPTRA DS) 800-160 MG per tablet Take 1 tablet by mouth 2 times daily for 10 days 20 tablet 0    Probiotic Acidophilus (FLORANEX) TABS Take 1 tablet by mouth daily 30 tablet 0    sucralfate (CARAFATE) 1 GM/10ML suspension Take 1 g by mouth 4 times daily (before meals and nightly) (Patient not taking: Reported on 12/16/2021)      vitamin B-12 (CYANOCOBALAMIN) 1000 MCG tablet Take 1,000 mcg by mouth 2 times daily       promethazine (PHENERGAN) 12.5 MG tablet Take 1-2 tablets by mouth every 8 hours as needed for Nausea 60 tablet 1    insulin glargine (BASAGLAR KWIKPEN) 100 UNIT/ML injection pen Inject 55 Units into the skin 2 times daily       DULoxetine (CYMBALTA) 60 MG extended release capsule Take 120 mg by mouth daily      metoprolol tartrate (LOPRESSOR) 25 MG tablet Take 1 tablet by mouth 2 times daily 60 tablet 5    empagliflozin (JARDIANCE) 25 MG tablet Take 25 mg by mouth daily (Patient not taking: Reported on 12/16/2021) 56 tablet 0    gabapentin (NEURONTIN) 600 MG tablet Take 600 mg by mouth 3 times daily.  blood glucose test strips (ONETOUCH ULTRA) strip 1 each by In Vitro route 3 times daily DX: E11.65 Dispense Onetouch Ultra 2 test strips 300 each 3    levETIRAcetam (KEPPRA) 1000 MG tablet Take 2 tablets by mouth 2 times daily 60 tablet 3    zonisamide (ZONEGRAN) 100 MG capsule Take 5 capsules by mouth nightly (Patient taking differently: Take 600 mg by mouth nightly ) 30 capsule 3    lactulose (CHRONULAC) 10 GM/15ML solution Take 15 mLs by mouth 3 times daily (Patient taking differently: Take 45 mLs by mouth 3 times daily ) 2 Bottle 1    insulin lispro (HUMALOG) 100 UNIT/ML injection vial Takes 10 units with meals plus group 2 sliding scale      pantoprazole (PROTONIX) 40 MG tablet Take 1 tablet by mouth 2 times daily 180 tablet 1    vitamin D (ERGOCALCIFEROL) 1.25 MG (47341 UT) CAPS capsule Take 1 capsule by mouth once a week (Patient taking differently: Take 50,000 Units by mouth once a week Monday) 12 capsule 1    rOPINIRole (REQUIP) 1 MG tablet TAKE 1 TABLET BY MOUTH THREE TIMES A  tablet 1    lacosamide (VIMPAT) 100 MG TABS tablet Take 200 mg by mouth 2 times daily.  sildenafil (VIAGRA) 100 MG tablet Take 1 tablet by mouth as needed for Erectile Dysfunction 45 tablet 3     No current facility-administered medications for this visit.         ALLERGIES:     Allergies external ear and canals   Nose: Nares normal, mucosa normal, no drainage    Throat: Lips and tongue normal; teeth normal;  gums normal   Neck: Supple, intact flexion, extension and rotation;   trachea midline;  no adenopathy;   thyroid: not enlarged;   no carotid pulse abnormality   Back:   Symmetric, no curvature, ROM adequate   Lungs:   Respirations unlabored   Heart:  Regular rate and rhythm           Extremities: Extremities normal, no cyanosis, no edema   Pulses: Symmetric over head and neck   Skin: Skin color, texture normal, no rashes, no lesions                                     NEUROLOGIC EXAMINATION    Neurologic Exam  Mental status    Alert and oriented x 3; intact memory with no confusion, speech or language problems; no hallucinations or delusions  Fund of information appropriate for level of education    Cranial nerves    II - visual fields intact to confrontation bilaterally  III, IV, VI  extra-ocular muscles full: no pupillary defect; no KARIS, no nystagmus, no ptosis   V - normal facial sensation                                                               VII - normal facial symmetry                                                             VIII - intact hearing                                                                             IX, X - symmetrical palate                                                                  XI - symmetrical shoulder shrug                                                       XII - tongue midline without atrophy or fasciculation      Motor function  Normal muscle bulk and tone; strength 5/5 on all 4 extremities, no pronator drift      Sensory function Unable to test      Cerebellar Intact fine motor movement. No involuntary movements or tremors. No ataxia or dysmetria on finger to nose or heel to shin testing      Reflex function Unable to test      Gait                   normal base and arm swing        Medical Decision Making:    This is a telehealth visit that was performed with the originating site at Patient Location: home and Provider Location of [place], [place]. Verbal consent to participate in video visit was obtained. Pursuant to the emergency declaration under the [facility], [facility] waiver authority and the [employer] Act, this Virtual Visit was conducted, with patient's consent, to reduce the patient's risk of exposure to COVID-19 and provide continuity of care for an established/new patient. Services were provided through a video synchronous discussion virtually to substitute for in-person clinic visit. I discussed with the patient the nature of our telehealth visits via interactive/real-time audio/video that:  - I would evaluate the patient and recommend diagnostics and treatments based on my assessment  - Our sessions are not being recorded and that personal health information is protected  - Our team would provide follow up care in person if/when the patient needs it. In summary, your patient, Yazmin Maradiaga exhibits the following, with associated plan:    1. Seizure disoder associated with right upper extremities tremor. The patient completed a LTME on 12/5/2021 to rule out subclinical seizures. The LTME completed at this time showed toxic metabolic encephalopathy and Depakote was stopped. 1. Continue keppra 2000 mg twice daily  2. Continue vimpat 200 mg twice daily  3. Continue zonegran 500 mg at bedtime daily   4. Continue ativan 0.5 mg twice daily   5. Return for follow up with Dr. Cam March 2. Upper extremity tremor likely associated with a movement disorder  1. Increase primidone to 50 mg 3 times daily  2. Patient to follow through on referral to a movement disorder specialist.  He was given names to contact, but has not yet done so.                       Signed: Concepcion Calvin CNP      *Please note that portions of this note were completed with a voice

## 2021-12-28 ENCOUNTER — TELEPHONE (OUTPATIENT)
Dept: WOUND CARE | Age: 51
End: 2021-12-28

## 2021-12-28 NOTE — TELEPHONE ENCOUNTER
----- Message from [employer] sent at 12/28/2021  8:21 AM EST -----   [phone] Has an appointment with Meche Mcdaniels on 1/3/22 @ 2:30pm. Wants to know if he can possibly get in sooner. The leg wound is getting larger and the pain is going up the leg.

## 2021-12-28 NOTE — TELEPHONE ENCOUNTER
Called the patient and updated him that Rubén Hampton is out and there is no available appointments to be seen. Patient is scheduled on 1/3/21 and recommended patient to be evaluated at the urgent care.

## 2021-12-29 ENCOUNTER — HOSPITAL ENCOUNTER (OUTPATIENT)
Dept: INTERVENTIONAL RADIOLOGY/VASCULAR | Age: 51
Discharge: HOME | End: 2021-12-29
Payer: MEDICARE

## 2021-12-29 ENCOUNTER — TELEPHONE (OUTPATIENT)
Dept: WOUND CARE | Age: 51
End: 2021-12-29

## 2021-12-29 ENCOUNTER — HOSPITAL ENCOUNTER (EMERGENCY)
Age: 51
Discharge: HOME | End: 2021-12-29
Attending: EMERGENCY MEDICINE
Payer: MEDICARE

## 2021-12-29 ENCOUNTER — TELEPHONE (OUTPATIENT)
Dept: INTERNAL MEDICINE CLINIC | Age: 51
End: 2021-12-29

## 2021-12-29 VITALS
HEART RATE: 76 BPM | DIASTOLIC BLOOD PRESSURE: 90 MMHG | TEMPERATURE: 98.7 F | OXYGEN SATURATION: 99 % | SYSTOLIC BLOOD PRESSURE: 150 MMHG | RESPIRATION RATE: 21 BRPM | WEIGHT: 260 LBS | BODY MASS INDEX: 33.38 KG/M2

## 2021-12-29 DIAGNOSIS — I82.4Y1 ACUTE DEEP VEIN THROMBOSIS (DVT) OF PROXIMAL VEIN OF RIGHT LOWER EXTREMITY (HCC): Primary | ICD-10-CM

## 2021-12-29 DIAGNOSIS — I80.291 THROMBOPHLEBITIS OF RIGHT LEG (HCC): ICD-10-CM

## 2021-12-29 PROCEDURE — 93971 EXTREMITY STUDY: CPT

## 2021-12-29 PROCEDURE — 99282 EMERGENCY DEPT VISIT SF MDM: CPT

## 2021-12-29 ASSESSMENT — PAIN DESCRIPTION - ONSET: ONSET: ON-GOING

## 2021-12-29 ASSESSMENT — PAIN DESCRIPTION - DESCRIPTORS: DESCRIPTORS: ACHING

## 2021-12-29 ASSESSMENT — PAIN DESCRIPTION - PROGRESSION: CLINICAL_PROGRESSION: NOT CHANGED

## 2021-12-29 ASSESSMENT — PAIN DESCRIPTION - FREQUENCY: FREQUENCY: CONTINUOUS

## 2021-12-29 ASSESSMENT — PAIN DESCRIPTION - ORIENTATION: ORIENTATION: RIGHT;LOWER

## 2021-12-29 ASSESSMENT — PAIN SCALES - GENERAL: PAINLEVEL_OUTOF10: 8

## 2021-12-29 ASSESSMENT — PAIN DESCRIPTION - PAIN TYPE: TYPE: ACUTE PAIN

## 2021-12-29 NOTE — ED NOTES
Patient to the ED with complaint of DVT. Patient states that he had DVT study today ordered by his family doctor. Patient states that he was notified by family doctor that he is to come to the ED due to DVT study result. Patient states he is having right lower leg pain. He states that he has history of dorian filter and states that he has history of blood clots. Patient in no distress at this time with easy and unlabored respirations.       Don Lazcano RN  12/29/21 [id number]

## 2021-12-29 NOTE — TELEPHONE ENCOUNTER
----- Message from Joe Maurer sent at 12/29/2021  8:45 AM EST -----  Does not need a call back. Called to cxl his 1/3/22 appointment. Just wanted to let us know that he is seeing another DrGina For his foot wound care. States that he couldn't get in sooner with us, so is seeing Dr. Cody Sosa now.

## 2021-12-30 ENCOUNTER — TELEPHONE (OUTPATIENT)
Dept: INTERNAL MEDICINE CLINIC | Age: 51
End: 2021-12-30

## 2021-12-30 ENCOUNTER — APPOINTMENT (OUTPATIENT)
Dept: GENERAL RADIOLOGY | Age: 51
End: 2021-12-30
Payer: MEDICARE

## 2021-12-30 ENCOUNTER — APPOINTMENT (OUTPATIENT)
Dept: CT IMAGING | Age: 51
End: 2021-12-30
Payer: MEDICARE

## 2021-12-30 ENCOUNTER — HOSPITAL ENCOUNTER (EMERGENCY)
Age: 51
Discharge: HOME | End: 2021-12-30
Payer: MEDICARE

## 2021-12-30 VITALS
SYSTOLIC BLOOD PRESSURE: 107 MMHG | DIASTOLIC BLOOD PRESSURE: 56 MMHG | RESPIRATION RATE: 18 BRPM | HEART RATE: 95 BPM | TEMPERATURE: 98.6 F | OXYGEN SATURATION: 96 %

## 2021-12-30 DIAGNOSIS — R07.9 NONSPECIFIC CHEST PAIN: Primary | ICD-10-CM

## 2021-12-30 DIAGNOSIS — E11.65 TYPE 2 DIABETES MELLITUS WITH HYPERGLYCEMIA, WITH LONG-TERM CURRENT USE OF INSULIN (HCC): ICD-10-CM

## 2021-12-30 DIAGNOSIS — Z79.4 TYPE 2 DIABETES MELLITUS WITH HYPERGLYCEMIA, WITH LONG-TERM CURRENT USE OF INSULIN (HCC): ICD-10-CM

## 2021-12-30 LAB
ANION GAP SERPL CALCULATED.3IONS-SCNC: 10 MEQ/L (ref 8–16)
BASOPHILS # BLD: 0.3 %
BASOPHILS ABSOLUTE: 0 THOU/MM3 (ref 0–0.1)
BUN BLDV-MCNC: 8 MG/DL (ref 7–22)
CALCIUM SERPL-MCNC: 8.7 MG/DL (ref 8.5–10.5)
CHLORIDE BLD-SCNC: 102 MEQ/L (ref 98–111)
CO2: 24 MEQ/L (ref 23–33)
CREAT SERPL-MCNC: 0.8 MG/DL (ref 0.4–1.2)
EOSINOPHIL # BLD: 1.4 %
EOSINOPHILS ABSOLUTE: 0.1 THOU/MM3 (ref 0–0.4)
ERYTHROCYTE [DISTWIDTH] IN BLOOD BY AUTOMATED COUNT: 12.6 % (ref 11.5–14.5)
ERYTHROCYTE [DISTWIDTH] IN BLOOD BY AUTOMATED COUNT: 41 FL (ref 35–45)
GFR SERPL CREATININE-BSD FRML MDRD: > 90 ML/MIN/1.73M2
GLUCOSE BLD-MCNC: 308 MG/DL (ref 70–108)
HCT VFR BLD CALC: 42.2 % (ref 42–52)
HEMOGLOBIN: 14.8 GM/DL (ref 14–18)
IMMATURE GRANS (ABS): 0.07 THOU/MM3 (ref 0–0.07)
IMMATURE GRANULOCYTES: 0.9 %
LYMPHOCYTES # BLD: 9.6 %
LYMPHOCYTES ABSOLUTE: 0.7 THOU/MM3 (ref 1–4.8)
MCH RBC QN AUTO: 31.6 PG (ref 26–33)
MCHC RBC AUTO-ENTMCNC: 35.1 GM/DL (ref 32.2–35.5)
MCV RBC AUTO: 90 FL (ref 80–94)
MONOCYTES # BLD: 7.6 %
MONOCYTES ABSOLUTE: 0.6 THOU/MM3 (ref 0.4–1.3)
NUCLEATED RED BLOOD CELLS: 0 /100 WBC
OSMOLALITY CALCULATION: 281.9 MOSMOL/KG (ref 275–300)
PLATELET # BLD: 215 THOU/MM3 (ref 130–400)
PMV BLD AUTO: 9.9 FL (ref 9.4–12.4)
POTASSIUM REFLEX MAGNESIUM: 4 MEQ/L (ref 3.5–5.2)
PRO-BNP: 17.9 PG/ML (ref 0–900)
RBC # BLD: 4.69 MILL/MM3 (ref 4.7–6.1)
SEG NEUTROPHILS: 80.2 %
SEGMENTED NEUTROPHILS ABSOLUTE COUNT: 5.9 THOU/MM3 (ref 1.8–7.7)
SODIUM BLD-SCNC: 136 MEQ/L (ref 135–145)
TROPONIN T: < 0.01 NG/ML
WBC # BLD: 7.4 THOU/MM3 (ref 4.8–10.8)

## 2021-12-30 PROCEDURE — 6360000002 HC RX W HCPCS: Performed by: NURSE PRACTITIONER

## 2021-12-30 PROCEDURE — 71045 X-RAY EXAM CHEST 1 VIEW: CPT

## 2021-12-30 PROCEDURE — 71275 CT ANGIOGRAPHY CHEST: CPT

## 2021-12-30 PROCEDURE — 6360000004 HC RX CONTRAST MEDICATION: Performed by: NURSE PRACTITIONER

## 2021-12-30 PROCEDURE — 83880 ASSAY OF NATRIURETIC PEPTIDE: CPT

## 2021-12-30 PROCEDURE — 85025 COMPLETE CBC W/AUTO DIFF WBC: CPT

## 2021-12-30 PROCEDURE — 99283 EMERGENCY DEPT VISIT LOW MDM: CPT

## 2021-12-30 PROCEDURE — 84484 ASSAY OF TROPONIN QUANT: CPT

## 2021-12-30 PROCEDURE — 80048 BASIC METABOLIC PNL TOTAL CA: CPT

## 2021-12-30 PROCEDURE — 96374 THER/PROPH/DIAG INJ IV PUSH: CPT

## 2021-12-30 PROCEDURE — 93005 ELECTROCARDIOGRAM TRACING: CPT | Performed by: EMERGENCY MEDICINE

## 2021-12-30 RX ORDER — MORPHINE SULFATE 4 MG/ML
4 INJECTION, SOLUTION INTRAMUSCULAR; INTRAVENOUS ONCE
Status: COMPLETED | OUTPATIENT
Start: 2021-12-30 | End: 2021-12-30

## 2021-12-30 RX ADMIN — MORPHINE SULFATE 4 MG: 4 INJECTION INTRAVENOUS at 15:44

## 2021-12-30 RX ADMIN — IOPAMIDOL 80 ML: 755 INJECTION, SOLUTION INTRAVENOUS at 15:57

## 2021-12-30 ASSESSMENT — ENCOUNTER SYMPTOMS
WHEEZING: 0
COLOR CHANGE: 0
CHEST TIGHTNESS: 0
DIARRHEA: 0
BACK PAIN: 0
SHORTNESS OF BREATH: 0
COUGH: 0
ABDOMINAL PAIN: 0
VOMITING: 0
NAUSEA: 0
ABDOMINAL DISTENTION: 0
RHINORRHEA: 0

## 2021-12-30 ASSESSMENT — PAIN SCALES - GENERAL: PAINLEVEL_OUTOF10: 7

## 2021-12-30 NOTE — ED PROVIDER NOTES
[place] [id number] COMPLAINT       Chief Complaint   Patient presents with    Other     Patient DVT+, told to come to ED       Nurses Notes reviewed and I agree except as noted in the HPI. HISTORY OF PRESENT ILLNESS    Nidia Macedo is a 46 y.o. male. Patient was sent into the emergency department because his DVT study came back positive. He had an outpatient DVT study done showing a 1 peritoneal clot. He does have a [place] filter. He denies any chest pain or dyspnea and is not currently on anticoagulants. REVIEW OF SYSTEMS         No Fever, no coughing, no abdominal pain    Remainder of review of systems is otherwise reviewed as negative. PAST MEDICAL HISTORY    has a past medical history of Abnormal thyroid biopsy, Acid reflux, Anemia, Anesthesia, Bile reflux gastritis, Bipolar disorder ([address] Blood clot in vein, Cancer ([address] Chest pain, Chronic back pain, Chronic bronchitis ([address] Colon polyp, Depression, DVT (deep venous thrombosis) ([address] Esophageal abnormality, Fatty liver disease, nonalcoholic, Furuncle, History of Doppler ultrasound, History of pulmonary embolism, Hx of blood clots, Hyperlipidemia, Hypertension, Intracranial arachnoid cyst, Irritable bowel syndrome, Liver disease, MDRO (multiple drug resistant organisms) resistance, MRSA (methicillin resistant staph aureus) culture positive, Nephrolithiasis, Neuropathy, Pancreatic insufficiency, Positive SANDY (antinuclear antibody), Prolonged emergence from general anesthesia, Restless legs syndrome, Seizures ([address] Sinus tachycardia, Skull fracture ([address] Sleep apnea, Systolic dysfunction, and Type II or unspecified type diabetes mellitus without mention of complication, not stated as uncontrolled. SURGICAL HISTORY      has a past surgical history that includes knee surgery (Left, 2007); Vena Cava Filter Placement (2007); hernia repair (Right, 1996);  Cholecystectomy (2004); Upper gastrointestinal endoscopy (2012); transthoracic echocardiogram (3-05-11); other surgical history (5-31-11); Cardiac catheterization; Cardiac catheterization (3-2012); craniotomy (11/2012); Tonsillectomy; Endoscopy, colon, diagnostic; EKG 12 Lead (10/18/2015); Knee arthroscopy (Right, 2016); Cardiac catheterization (04/28/2016); Upper gastrointestinal endoscopy (2016); Inguinal hernia repair (Right, 09/15/2016); Colonoscopy (2012); Colonoscopy (08/2016); other surgical history (01/20/2017); shoulder surgery (Right, 01/2007); Carpal tunnel release (01/2017); Upper gastrointestinal endoscopy (Left, 10/22/2019); Upper gastrointestinal endoscopy (Left, 10/22/2019); Thyroid lobectomy (N/A, 1/15/2021); Upper gastrointestinal endoscopy (N/A, 11/16/2021); and Upper gastrointestinal endoscopy (Left, 11/16/2021). CURRENT MEDICATIONS       Discharge Medication List as of 12/29/2021  5:52 PM      CONTINUE these medications which have NOT CHANGED    Details   gabapentin (NEURONTIN) 600 MG tablet Take 1 tablet by mouth 4 times daily for 90 days. , Disp-360 tablet, R-0Normal      rOPINIRole (REQUIP) 1 MG tablet TAKE 1 TABLET BY MOUTH THREE TIMES A DAY, Disp-270 tablet, R-1Normal      primidone (MYSOLINE) 50 MG tablet Take 1 tablet by mouth 3 times daily, Disp-90 tablet, R-3Normal      LORazepam (ATIVAN) 0.5 MG tablet Take 1 tablet by mouth daily as needed (breakthrough seizures) for up to 30 days. , Disp-30 tablet, R-0Normal      ARIPiprazole (ABILIFY) 5 MG tablet Historical Med      Probiotic Acidophilus (FLORANEX) TABS Take 1 tablet by mouth daily, Disp-30 tablet, R-0Normal      sucralfate (CARAFATE) 1 GM/10ML suspension Take 1 g by mouth 4 times daily (before meals and nightly)Historical Med      vitamin B-12 (CYANOCOBALAMIN) 1000 MCG tablet Take 1,000 mcg by mouth 2 times daily Historical Med      promethazine (PHENERGAN) 12.5 MG tablet Take 1-2 tablets by mouth every 8 hours as needed for Nausea, Disp-60 tablet, R-1Phone In      insulin glargine (BASAGLAR KWIKPEN) 100 UNIT/ML injection pen Inject 55 Units into the skin 2 times daily Historical Med      DULoxetine (CYMBALTA) 60 MG extended release capsule Take 120 mg by mouth dailyHistorical Med      metoprolol tartrate (LOPRESSOR) 25 MG tablet Take 1 tablet by mouth 2 times daily, Disp-60 tablet, R-5Normal      empagliflozin (JARDIANCE) 25 MG tablet Take 25 mg by mouth daily, Disp-56 tablet, R-0Lot # [id number] expiration date 2023 received ample      blood glucose test strips (ONETOUCH ULTRA) strip 1 each by In Vitro route 3 times daily DX: E11.65 Dispense Onetouch Ultra 2 test strips, Disp-300 each, R-3Normal      levETIRAcetam (KEPPRA) 1000 MG tablet Take 2 tablets by mouth 2 times daily, Disp-60 tablet, R-3Normal      zonisamide (ZONEGRAN) 100 MG capsule Take 5 capsules by mouth nightly, Disp-30 capsule, R-3Normal      lactulose (CHRONULAC) 10 GM/15ML solution Take 15 mLs by mouth 3 times daily, Disp-2 Bottle, R-1Normal      insulin lispro (HUMALOG) 100 UNIT/ML injection vial Takes 10 units with meals plus group 2 sliding scaleHistorical Med      pantoprazole (PROTONIX) 40 MG tablet Take 1 tablet by mouth 2 times daily, Disp-180 tablet, R-1Normal      vitamin D (ERGOCALCIFEROL) 1.25 MG (87759 UT) CAPS capsule Take 1 capsule by mouth once a week, Disp-12 capsule, R-1Normal      lacosamide (VIMPAT) 100 MG TABS tablet Take 200 mg by mouth 2 times daily. Historical Med      sildenafil (VIAGRA) 100 MG tablet Take 1 tablet by mouth as needed for Erectile Dysfunction, Disp-45 tablet, R-3Print             ALLERGIES     is allergic to ciprofloxacin-ciproflox hcl er, heparin, metformin, niacin and related, tramadol hcl, ultram [tramadol], and warfarin. FAMILY HISTORY     He indicated that his mother is alive. He indicated that his father is . He indicated that his sister is alive. He indicated that three of his five brothers are alive.  He indicated that the status of his daughter is unknown.   family history includes Arthritis in his mother; Diabetes in his daughter; Heart Disease (age of onset: 61) in his father; Obesity in his brother and sister; Other in his brother; Seizures in his brother and mother; Stroke in his brother. SOCIAL HISTORY      reports that he has never smoked. He has never used smokeless tobacco. He reports that he does not drink alcohol and does not use drugs. PHYSICAL EXAM     INITIAL VITALS:  weight is 260 lb (117.9 kg). His oral temperature is 98.7 °F (37.1 °C). His blood pressure is 150/90 (abnormal) and his pulse is 76. His respiration is 21 and oxygen saturation is 99%. Constitutional: Well appearing and non-toxic   Eyes:  Pupils are equal and reactive, extraocular muscles intact   HENT:  Atraumatic appearing  oropharynx moist, no pharyngeal exudates. Neck- normal range of motion,supple   Respiratory:  No wheezing, rhonchi or rales  Cardiovascular: regular  GI:  Non tender, no rigidity, rebound or guarding  Musculoskeletal:  2/4 distal pulses, no pitting edema  Integument: warm and dry  Neurologic:  Alert & oriented x 3           DIAGNOSTIC RESULTS        LABS:   Labs Reviewed - No data to display    EMERGENCY DEPARTMENT COURSE:   Vitals:    Vitals:    12/29/21 1631   BP: (!) 150/90   Pulse: 76   Resp: 21   Temp: 98.7 °F (37.1 °C)   TempSrc: Oral   SpO2: 99%   Weight: 260 lb (117.9 kg)     Patient's Doppler study showed DVT in the peroneal vein on the right. He has no signs or symptoms to suggest pulmonary embolism and in fact has a [place] filter. We can start him on Xarelto. First dose given here. Prescription for 50 mg twice daily for the first 21 days. CRITICAL CARE:   none         FINAL IMPRESSION      1.  Acute deep vein thrombosis (DVT) of proximal vein of right lower extremity [facility])          DISPOSITION/PLAN   discharged    DISCHARGE MEDICATIONS:  Discharge Medication List as of 12/29/2021  5:52 PM START taking these medications    Details   rivaroxaban (XARELTO) 15 MG TABS tablet Take 1 tablet by mouth 2 times daily (with meals) for 21 days Xarelto 15 mg BID x 21 days then 20 mg QD, Disp-42 tablet, R-0Print             (Please note that portions of this note were completed with a voice recognition program.  Efforts were made to edit the dictations but occasionally words are mis-transcribed.)    Fabienne Vazquez, [address], DO  12/30/21 0424

## 2021-12-30 NOTE — ED TRIAGE NOTES
Pt presents to ED with chief complaint of chest pain that started around 5 am. Describes the pain as a \"tightness\" in mid chest that radiates to back. States he was diagnosed with a DVT yesterday in his right leg. Reports hx of PE. Is now currently taking Eliquis and has an IVC filter. Denies shortness of breath.

## 2021-12-30 NOTE — ED PROVIDER NOTES
[facility] Emergency Department    CHIEF COMPLAINT       Chief Complaint   Patient presents with    Chest Pain     DVT diagnosed yesterday       Nurses Notes reviewed and I agree except as noted in the HPI. HISTORY OF PRESENT ILLNESS    Isidro Solano is a 46 y.o. male who presents to the ED for evaluation of chest pain. Patient notes right-sided chest pain that began at 5 AM this morning. Describes as an achy pain. Does not know any eliciting symptoms. Denies history of coronary artery disease. Has past medical history of severe diabetes, currently has a wound in the right foot causing some mobility. Was seen here in the ER yesterday and diagnosed with DVT in the right leg. Was started on Xarelto. Also notes history of [place] filter. He denies any pleuritic pain in the chest, denies nausea vomiting diarrhea. Denies any rash to the skin. Denies fevers chills or ill contacts. HPI was provided by the patient. REVIEW OF SYSTEMS     Review of Systems   Constitutional: Negative for activity change, appetite change, chills, fatigue and fever. HENT: Negative for congestion and rhinorrhea. Respiratory: Negative for cough, chest tightness, shortness of breath and wheezing. Cardiovascular: Positive for chest pain and leg swelling. Negative for palpitations. Gastrointestinal: Negative for abdominal distention, abdominal pain, diarrhea, nausea and vomiting. Genitourinary: Negative for difficulty urinating, dysuria and flank pain. Musculoskeletal: Negative for arthralgias and back pain. Skin: Negative for color change and rash. Allergic/Immunologic: Negative for immunocompromised state. Neurological: Positive for tremors (Chronic). Negative for dizziness, light-headedness and headaches. Hematological: Does not bruise/bleed easily. Psychiatric/Behavioral: Negative for agitation, behavioral problems and confusion.         PAST MEDICAL HISTORY     Past Medical History:   Diagnosis Date    Abnormal thyroid biopsy     s/p left hemithyroidectomy 7/3352 - follicular adenoma    Acid reflux     Anemia     resolved - previously seen by Dr. Cochran Shock (due to enlarged spleen)    Anesthesia     seizures with brain surgery due to blood sugar got really high    Bile reflux gastritis     per EGD 11/16/21 - Dr. Fili Pritchard - to start Carafate.  Bipolar disorder ([facility] [address]     Blood clot in vein 04/07/2016    Cindy Medal     Cancer [facility]) 04/2019    thyroid    Chest pain     previously seeing [facility] cardiologist, now seeing  [facility] (LAD bridging on cath 4/2016)    Chronic back pain     Chronic bronchitis ([facility] [address]     Dr. Cher Vela Colon polyp 08/30/2016    Depression     seeing Bryan Limon DVT (deep venous thrombosis) ([facility] [address] [phone]    not on Coumadin due to unable to regulate - Dr. Wing Funk - no etiology found per patient    Esophageal abnormality     nodule     Fatty liver disease, nonalcoholic     U/S 55/1981 [facility]     legs    History of Doppler ultrasound 05/29/2011    No hemodynamically significant carotid stenosis is identified. A thyroid nodule on each side. Dedicated ultrasound of thyroid gland is suggested to further evaluate if clinically indicated.      History of pulmonary embolism 2007    s/p GFF, related to knee surgery    Hx of blood clots     leg and lung    Hyperlipidemia     severely elevated triglycerides    Hypertension     diastolic    Intracranial arachnoid cyst 11/2012    Dr. Gomez Re drained, complicated with seizures, DKA    Irritable bowel syndrome     Liver disease     Claire Redhead - elevated LFT - positive smooth muscle antibody, steatosis per liver bx 3/2014 with Dr. Juve Ferrell (multiple drug resistant organisms) resistance 2012    MRSA (methicillin resistant staph aureus) culture positive     h/o in foot and before brain surgery    Nephrolithiasis     noted on CT abdomen 9/2016, 6/2019    Neuropathy     Pancreatic insufficiency     Positive SANDY (antinuclear antibody)     Dr. Phil Mackey - first visit in 6/2013    Prolonged emergence from general anesthesia     Restless legs syndrome     Seizures ([address]     started with brain surgery    Sinus tachycardia     Holter 3/2013 - seeing Dr. Josue Jacobsen Skull fracture [facility])     clips     Sleep apnea     positive sleep apnea per study 10/2020.  Systolic dysfunction     \"systolic bridge\"  EF 60% ECHO 9/2019    Type II or unspecified type diabetes mellitus without mention of complication, not stated as uncontrolled 2007       SURGICALHISTORY      has a past surgical history that includes knee surgery (Left, 2007); Vena Cava Filter Placement (2007); hernia repair (Right, 1996); Cholecystectomy (2004); Upper gastrointestinal endoscopy (2012); transthoracic echocardiogram (3-05-11); other surgical history (5-31-11); Cardiac catheterization; Cardiac catheterization (3-2012); craniotomy (11/2012); Tonsillectomy; Endoscopy, colon, diagnostic; EKG 12 Lead (10/18/2015); Knee arthroscopy (Right, 2016); Cardiac catheterization (04/28/2016); Upper gastrointestinal endoscopy (2016); Inguinal hernia repair (Right, 09/15/2016); Colonoscopy (2012); Colonoscopy (08/2016); other surgical history (01/20/2017); shoulder surgery (Right, 01/2007); Carpal tunnel release (01/2017); Upper gastrointestinal endoscopy (Left, 10/22/2019); Upper gastrointestinal endoscopy (Left, 10/22/2019); Thyroid lobectomy (N/A, 1/15/2021); Upper gastrointestinal endoscopy (N/A, 11/16/2021); and Upper gastrointestinal endoscopy (Left, 11/16/2021). CURRENT MEDICATIONS       Discharge Medication List as of 12/30/2021  4:40 PM      CONTINUE these medications which have NOT CHANGED    Details   rivaroxaban (XARELTO) 15 MG TABS tablet Take 1 tablet by mouth 2 times daily (with meals) for 21 days Xarelto 15 mg BID x 21 days then 20 mg QD, Disp-42 tablet, R-0Print      gabapentin (NEURONTIN) 600 MG tablet Take 1 tablet by mouth 4 times daily for 90 days. , Disp-360 tablet, R-0Normal      rOPINIRole (REQUIP) 1 MG tablet TAKE 1 TABLET BY MOUTH THREE TIMES A DAY, Disp-270 tablet, R-1Normal      primidone (MYSOLINE) 50 MG tablet Take 1 tablet by mouth 3 times daily, Disp-90 tablet, R-3Normal      LORazepam (ATIVAN) 0.5 MG tablet Take 1 tablet by mouth daily as needed (breakthrough seizures) for up to 30 days. , Disp-30 tablet, R-0Normal      ARIPiprazole (ABILIFY) 5 MG tablet Historical Med      Probiotic Acidophilus (FLORANEX) TABS Take 1 tablet by mouth daily, Disp-30 tablet, R-0Normal      sucralfate (CARAFATE) 1 GM/10ML suspension Take 1 g by mouth 4 times daily (before meals and nightly)Historical Med      vitamin B-12 (CYANOCOBALAMIN) 1000 MCG tablet Take 1,000 mcg by mouth 2 times daily Historical Med      promethazine (PHENERGAN) 12.5 MG tablet Take 1-2 tablets by mouth every 8 hours as needed for Nausea, Disp-60 tablet, R-1Phone In      insulin glargine (BASAGLAR KWIKPEN) 100 UNIT/ML injection pen Inject 55 Units into the skin 2 times daily Historical Med      DULoxetine (CYMBALTA) 60 MG extended release capsule Take 120 mg by mouth dailyHistorical Med      metoprolol tartrate (LOPRESSOR) 25 MG tablet Take 1 tablet by mouth 2 times daily, Disp-60 tablet, R-5Normal      empagliflozin (JARDIANCE) 25 MG tablet Take 25 mg by mouth daily, Disp-56 tablet, R-0Lot # [id number] expiration date 6/2023 received 8/23/2021Sample      blood glucose test strips (ONETOUCH ULTRA) strip 1 each by In Vitro route 3 times daily DX: E11.65 Dispense Onetouch Ultra 2 test strips, Disp-300 each, R-3Normal      levETIRAcetam (KEPPRA) 1000 MG tablet Take 2 tablets by mouth 2 times daily, Disp-60 tablet, R-3Normal      zonisamide (ZONEGRAN) 100 MG capsule Take 5 capsules by mouth nightly, Disp-30 capsule, R-3Normal      lactulose (CHRONULAC) 10 GM/15ML solution Take 15 mLs by mouth 3 times daily, Disp-2 Bottle, R-1Normal      insulin lispro (HUMALOG) 100 UNIT/ML injection vial Takes 10 units with meals plus group 2 sliding scaleHistorical Med      pantoprazole (PROTONIX) 40 MG tablet Take 1 tablet by mouth 2 times daily, Disp-180 tablet, R-1Normal      vitamin D (ERGOCALCIFEROL) 1.25 MG (24159 UT) CAPS capsule Take 1 capsule by mouth once a week, Disp-12 capsule, R-1Normal      lacosamide (VIMPAT) 100 MG TABS tablet Take 200 mg by mouth 2 times daily. Historical Med      sildenafil (VIAGRA) 100 MG tablet Take 1 tablet by mouth as needed for Erectile Dysfunction, Disp-45 tablet, R-3Print             ALLERGIES     is allergic to ciprofloxacin-ciproflox hcl er, heparin, metformin, niacin and related, tramadol hcl, ultram [tramadol], and warfarin. FAMILY HISTORY     He indicated that his mother is alive. He indicated that his father is . He indicated that his sister is alive. He indicated that three of his five brothers are alive. He indicated that the status of his daughter is unknown.   family history includes Arthritis in his mother; Diabetes in his daughter; Heart Disease (age of onset: 61) in his father; Obesity in his brother and sister; Other in his brother; Seizures in his brother and mother; Stroke in his brother.     SOCIAL HISTORY       Social History     Socioeconomic History    Marital status:      Spouse name: Not on file    Number of children: 3    Years of education: Not on file    Highest education level: Not on file   Occupational History    Occupation: Disability since    Tobacco Use    Smoking status: Never Smoker    Smokeless tobacco: Never Used   Vaping Use    Vaping Use: Never used   Substance and Sexual Activity    Alcohol use: No     Alcohol/week: 0.0 standard drinks    Drug use: No    Sexual activity: Not on file   Other Topics Concern    Not on file   Social History Narrative    Not on file     Social Determinants of Health     Financial Resource Strain: Medium Risk    Difficulty of Paying Living Expenses: Somewhat hard   Food Insecurity: No Food Insecurity    Worried About Running Out of Food in the Last Year: Never true    Jeffry of Food in the Last Year: Never true   Transportation Needs:     Lack of Transportation (Medical): Not on file    Lack of Transportation (Non-Medical): Not on file   Physical Activity:     Days of Exercise per Week: Not on file    Minutes of Exercise per Session: Not on file   Stress:     Feeling of Stress : Not on file   Social Connections:     Frequency of Communication with Friends and Family: Not on file    Frequency of Social Gatherings with Friends and Family: Not on file    Attends Roman [employer] Services: Not on file    Active Member of [address] [place] [address] [employer] or Organizations: Not on file    Attends Club or Organization Meetings: Not on file    Marital Status: Not on file   Intimate Partner Violence:     Fear of Current or Ex-Partner: Not on file    Emotionally Abused: Not on file    Physically Abused: Not on file    Sexually Abused: Not on file   Housing Stability:     Unable to Pay for Housing in the Last Year: Not on file    Number of [place] in the Last Year: Not on file    Unstable Housing in the Last Year: Not on file       PHYSICAL EXAM     INITIAL VITALS:  oral temperature is 98.6 °F (37 °C). His blood pressure is 107/56 (abnormal) and his pulse is 95. His respiration is 18 and oxygen saturation is 96%. Physical Exam  Vitals and nursing note reviewed. Constitutional:       Appearance: Normal appearance. He is well-developed. HENT:      Head: Normocephalic. Right Ear: External ear normal.      Left Ear: External ear normal.      Nose: Nose normal.      Mouth/Throat:      Pharynx: Uvula midline. Eyes:      Conjunctiva/sclera: Conjunctivae normal.   Cardiovascular:      Rate and Rhythm: Normal rate and regular rhythm. Heart sounds: Normal heart sounds, S1 normal and S2 normal.   Pulmonary:      Effort: Pulmonary effort is normal. No respiratory distress.       Breath sounds: Normal breath sounds. Chest:      Chest wall: No tenderness. Abdominal:      General: Bowel sounds are normal. There is no distension. Palpations: Abdomen is soft. Tenderness: There is no abdominal tenderness. Musculoskeletal:         General: Normal range of motion. Cervical back: Normal range of motion and neck supple. Skin:     General: Skin is warm and dry. Coloration: Skin is not pale. Findings: No erythema or rash. Neurological:      Mental Status: He is alert and oriented to person, place, and time. Psychiatric:         Behavior: Behavior normal.         Thought Content: Thought content normal.         Judgment: Judgment normal.         DIFFERENTIAL DIAGNOSIS:   PE, ACS, pleurisy, pneumonia, muscle strain  DIAGNOSTIC RESULTS     EKG: All EKG's are interpreted by the Emergency Department Physician who eithersigns or Co-signs this chart in the absence of a cardiologist.    EKG read and interpreted by myself with comparison to November 13, 2021 gives impression of normal sinus rhythm with heart rate of 94; interval 182; QRS 78;QTc 445; axis P-32, R-42, T-26. RADIOLOGY: non-plainfilm images(s) such as CT, Ultrasound and MRI are read by the radiologist.  Plain radiographic images are visualized and preliminarily interpreted by the emergency physician unless otherwise stated below. CTA CHEST W WO CONTRAST   Final Result   1. No pulmonary emboli are seen. 2. Mild diffuse interstitial pneumonia/edema in both lungs. **This report has been created using voice recognition software. It may contain minor errors which are inherent in voice recognition technology. **          Final report electronically signed by Dr. Gabbi Oh on 12/30/2021 4:17 PM      XR CHEST PORTABLE   Final Result   Low lung volumes. Bilateral lower lobe atelectasis. No acute intrathoracic process. **This report has been created using voice recognition software.   It may contain minor errors which are inherent in voice recognition technology. **      Final report electronically signed by Dr Sonu Walters on 12/30/2021 3:36 PM            LABS:   Labs Reviewed   BASIC METABOLIC PANEL W/ REFLEX TO MG FOR LOW K - Abnormal; Notable for the following components:       Result Value    Glucose 308 (*)     All other components within normal limits   CBC WITH AUTO DIFFERENTIAL - Abnormal; Notable for the following components:    RBC 4.69 (*)     Lymphocytes Absolute 0.7 (*)     All other components within normal limits   BRAIN NATRIURETIC PEPTIDE   TROPONIN   ANION GAP   GLOMERULAR FILTRATION RATE, ESTIMATED   OSMOLALITY       EMERGENCY DEPARTMENT COURSE:   Vitals:    Vitals:    12/30/21 1455 12/30/21 1457 12/30/21 1510   BP:  136/89 (!) 107/56   Pulse: 94  95   Resp: 20  18   Temp: 98.6 °F (37 °C)     TempSrc: Oral     SpO2: 97%  96%         MDM    Patient was seen and evaluated in the emergency department, patient appeared to be in no acute distress, vital signs reviewed, no significant findings noted. Physical exam was completed, no significant abnormalities noted. Labs were obtained, no significant findings noted. Troponin negative, pain began at 5 AM this morning, minimal pain at this time. Not feel repeat troponin needed. Chest x-ray negative. Patient concerned about PE, CT of the chest obtained and negative for PE. Discussed my findings and plan of care with patient he is amenable discharge. He is advised to return to the ER for worsening symptoms. Advised to continue take Tylenol for pain. Patient verbalized understanding plan of care. Medications   morphine injection 4 mg (4 mg IntraVENous Given 12/30/21 8403)   iopamidol (ISOVUE-370) 76 % injection 80 mL (80 mLs IntraVENous Given 12/30/21 5567)       Patient was seenindependently by myself. The patient's final impression and disposition and plan was determined by myself.      CRITICAL CARE:   None    CONSULTS:  None    PROCEDURES:  None    FINAL IMPRESSION     1. Nonspecific chest pain    2. Type 2 diabetes mellitus with hyperglycemia, with long-term current use of insulin [facility])          DISPOSITION/PLAN   Patient discharged in stable condition    PATIENT REFERREDTO:  Gricelda Adams MD  [facility], [address]  [address] [facility] [place] [place] [address]    Call   For follow up and evaluation      DISCHARGE MEDICATIONS:  Discharge Medication List as of 12/30/2021  4:40 PM          (Please note that portions of this note were completed with a voice recognition program.  Efforts were made to edit the dictations but occasionally words are mis-transcribed.)      Provider:  I personally performed the services described in the documentation,reviewed and edited the documentation which was dictated to the scribe in my presence, and it accurately records my words and actions.     Jeffery Mendoza CNP 12/30/21 6:19 PM    Yadira Mendoza, APRN - CNP        [employer], APRN - CNP  12/31/21 3888

## 2021-12-31 LAB
EKG ATRIAL RATE: 94 BPM
EKG P AXIS: 32 DEGREES
EKG P-R INTERVAL: 182 MS
EKG Q-T INTERVAL: 356 MS
EKG QRS DURATION: 78 MS
EKG QTC CALCULATION (BAZETT): 445 MS
EKG R AXIS: 42 DEGREES
EKG T AXIS: 26 DEGREES
EKG VENTRICULAR RATE: 94 BPM

## 2022-01-03 ENCOUNTER — TELEPHONE (OUTPATIENT)
Dept: NEUROLOGY | Age: 52
End: 2022-01-03

## 2022-01-03 DIAGNOSIS — R25.1 TREMORS OF NERVOUS SYSTEM: Primary | ICD-10-CM

## 2022-01-03 NOTE — TELEPHONE ENCOUNTER
Pt's wife called in stating she had called Dr Mickey Pruett office to get pt scheduled for an appt. They were told they needed a referral and clinic notes. I didn't see that we placed a referral. She stated that the names came from when pt was in for his LTME.  Did you want to place the referral?

## 2022-01-10 ENCOUNTER — TELEPHONE (OUTPATIENT)
Dept: WOUND CARE | Age: 52
End: 2022-01-10

## 2022-01-13 ENCOUNTER — OFFICE VISIT (OUTPATIENT)
Dept: INTERNAL MEDICINE CLINIC | Age: 52
End: 2022-01-13
Payer: MEDICARE

## 2022-01-13 VITALS — TEMPERATURE: 98 F | HEART RATE: 96 BPM | SYSTOLIC BLOOD PRESSURE: 120 MMHG | DIASTOLIC BLOOD PRESSURE: 84 MMHG

## 2022-01-13 DIAGNOSIS — Z79.4 TYPE 2 DIABETES MELLITUS WITH DIABETIC POLYNEUROPATHY, WITH LONG-TERM CURRENT USE OF INSULIN (HCC): ICD-10-CM

## 2022-01-13 DIAGNOSIS — I82.451 ACUTE DEEP VEIN THROMBOSIS (DVT) OF RIGHT PERONEAL VEIN (HCC): Primary | ICD-10-CM

## 2022-01-13 DIAGNOSIS — E11.42 TYPE 2 DIABETES MELLITUS WITH DIABETIC POLYNEUROPATHY, WITH LONG-TERM CURRENT USE OF INSULIN (HCC): ICD-10-CM

## 2022-01-13 DIAGNOSIS — F31.30 BIPOLAR DISORDER, CURRENT EPISODE DEPRESSED, MILD OR MODERATE SEVERITY, UNSPECIFIED (HCC): ICD-10-CM

## 2022-01-13 DIAGNOSIS — R56.9 PARTIAL SEIZURES (HCC): ICD-10-CM

## 2022-01-13 DIAGNOSIS — R00.0 SINUS TACHYCARDIA: ICD-10-CM

## 2022-01-13 DIAGNOSIS — L98.491 CHRONIC SKIN ULCER, LIMITED TO BREAKDOWN OF SKIN (HCC): ICD-10-CM

## 2022-01-13 DIAGNOSIS — E72.20 HYPERAMMONEMIA (HCC): ICD-10-CM

## 2022-01-13 PROCEDURE — 99214 OFFICE O/P EST MOD 30 MIN: CPT | Performed by: INTERNAL MEDICINE

## 2022-01-13 PROCEDURE — G8417 CALC BMI ABV UP PARAM F/U: HCPCS | Performed by: INTERNAL MEDICINE

## 2022-01-13 PROCEDURE — 3046F HEMOGLOBIN A1C LEVEL >9.0%: CPT | Performed by: INTERNAL MEDICINE

## 2022-01-13 PROCEDURE — G8484 FLU IMMUNIZE NO ADMIN: HCPCS | Performed by: INTERNAL MEDICINE

## 2022-01-13 PROCEDURE — G8427 DOCREV CUR MEDS BY ELIG CLIN: HCPCS | Performed by: INTERNAL MEDICINE

## 2022-01-13 PROCEDURE — 3017F COLORECTAL CA SCREEN DOC REV: CPT | Performed by: INTERNAL MEDICINE

## 2022-01-13 PROCEDURE — 1036F TOBACCO NON-USER: CPT | Performed by: INTERNAL MEDICINE

## 2022-01-13 PROCEDURE — 2022F DILAT RTA XM EVC RTNOPTHY: CPT | Performed by: INTERNAL MEDICINE

## 2022-01-13 RX ORDER — DOXYCYCLINE HYCLATE 100 MG/1
100 CAPSULE ORAL 2 TIMES DAILY
COMMUNITY
End: 2022-02-21 | Stop reason: ALTCHOICE

## 2022-01-13 RX ORDER — SUCRALFATE 1 G/1
1 TABLET ORAL 4 TIMES DAILY
COMMUNITY

## 2022-01-13 RX ORDER — DOXYCYCLINE 100 MG/10ML
INJECTION, POWDER, LYOPHILIZED, FOR SOLUTION INTRAVENOUS
COMMUNITY
Start: 2022-01-12 | End: 2022-01-13

## 2022-01-13 ASSESSMENT — PATIENT HEALTH QUESTIONNAIRE - PHQ9
1. LITTLE INTEREST OR PLEASURE IN DOING THINGS: 1
2. FEELING DOWN, DEPRESSED OR HOPELESS: 1
SUM OF ALL RESPONSES TO PHQ QUESTIONS 1-9: 2
SUM OF ALL RESPONSES TO PHQ QUESTIONS 1-9: 2
8. MOVING OR SPEAKING SO SLOWLY THAT OTHER PEOPLE COULD HAVE NOTICED. OR THE OPPOSITE, BEING SO FIGETY OR RESTLESS THAT YOU HAVE BEEN MOVING AROUND A LOT MORE THAN USUAL: 0
10. IF YOU CHECKED OFF ANY PROBLEMS, HOW DIFFICULT HAVE THESE PROBLEMS MADE IT FOR YOU TO DO YOUR WORK, TAKE CARE OF THINGS AT HOME, OR GET ALONG WITH OTHER PEOPLE: 0
SUM OF ALL RESPONSES TO PHQ QUESTIONS 1-9: 2
SUM OF ALL RESPONSES TO PHQ QUESTIONS 1-9: 8
9. THOUGHTS THAT YOU WOULD BE BETTER OFF DEAD, OR OF HURTING YOURSELF: 0
4. FEELING TIRED OR HAVING LITTLE ENERGY: 0
6. FEELING BAD ABOUT YOURSELF - OR THAT YOU ARE A FAILURE OR HAVE LET YOURSELF OR YOUR FAMILY DOWN: 0
SUM OF ALL RESPONSES TO PHQ9 QUESTIONS 1 & 2: 2
SUM OF ALL RESPONSES TO PHQ QUESTIONS 1-9: 8
5. POOR APPETITE OR OVEREATING: 2
SUM OF ALL RESPONSES TO PHQ9 QUESTIONS 1 & 2: 2
SUM OF ALL RESPONSES TO PHQ QUESTIONS 1-9: 8
1. LITTLE INTEREST OR PLEASURE IN DOING THINGS: 1
3. TROUBLE FALLING OR STAYING ASLEEP: 2
2. FEELING DOWN, DEPRESSED OR HOPELESS: 1
SUM OF ALL RESPONSES TO PHQ QUESTIONS 1-9: 2
SUM OF ALL RESPONSES TO PHQ QUESTIONS 1-9: 8
7. TROUBLE CONCENTRATING ON THINGS, SUCH AS READING THE NEWSPAPER OR WATCHING TELEVISION: 2

## 2022-01-13 NOTE — PROGRESS NOTES
1970    Chief Complaint   Patient presents with    1 Month Follow-Up     right foot ulcer  , DVT /A1c 7.4 on 11/13    Diabetes       Pt is a 46 y.o. male who presents for    HPI    He has multiple sores on legs - now has one on ball of right foot. On Doxy another 2 weeks. He is following with podiatry. New DVT in peroneal vein on right 12/29/21 - started on Xarelto. Ask Charisse Maldonado for help with insulin and Xarelto cost.  He had an ACL repair and infection after, then had DVT, PICC then DVT in arm then PE. But all was a provoked episode. Cost is big issue for treatment and Coumadin was difficult to get a consistent level the last time he was treated. Would really like to stay on Xarelto. DM - running high - Basaglar increased from 50 BID to 55 BID to now this am 60 Units BID - try this. Short acting insulin sliding scale - 200  He will give 20 Units, 250 he will give 25 Units and so on. Asked them to adjust Basaglar by 5 Units till am FSBS at goal.  Most recent eye exam: 6/2/20 - Dr. Domenica Mitchell. Minimal/mild DM retinopathy. Will get eye exam when other medical issues more stable. Renal function - normal - eGFR >90 12/30/21. Microalbumin/creatinine ratio: 59 on 12/29/2020. Positive new foot lesions as above, positive peripheral neuropathy - severe. LDL 70 4/2021 at goal - not on statin. Autonomic dysfunction -as above. He typically eats twice a day - suggested eating 3 x a day and use insulin for all meals. Checking FSBS 2-3x daily. GI - stop Aleve that he is currently taking for foot pain due to being on Xarelto and stomach upset. He is better now that he is on carafate pills -following with MAREN. GI watching ammonia levels. Fatty liver but no fibrosis - Fibroscan due in 6 mo again. Seizures/tremor of right upper extremity/occ right leg - Unsure what tremor is (not correlating to seizure on EEG) - saw [facility] neurologist.  Now on primidone and helping. Tremor is not as often. Less seizures. Off Depakote - thought this was increasing ammonia levels. Past Medical History:   Diagnosis Date    Abnormal thyroid biopsy     s/p left hemithyroidectomy 5/4518 - follicular adenoma    Acid reflux     Anemia     resolved - previously seen by Dr. Amy Thomson (due to enlarged spleen)    Anesthesia     seizures with brain surgery due to blood sugar got really high    Bile reflux gastritis     per EGD 11/16/21 - Dr. Alyse Carrillo - to start Carafate.  Bipolar disorder ([facility]     Blood clot in vein 04/07/2016    Trevon Delhi     Cancer [facility]) 04/2019    thyroid    Chest pain     previously seeing [facility] cardiologist, now seeing Dr. Yandy Lance (LAD bridging on cath 4/2016)    Chronic back pain     Chronic bronchitis ([facility]     Dr. Lorena Noonan Colon polyp 08/30/2016    Depression     seeing Alejandra Meyercal DVT (deep venous thrombosis) ([facility] [id number]    not on Coumadin due to unable to regulate - Dr. Dung Ontiveros - no etiology found per patient    Esophageal abnormality     nodule     Fatty liver disease, nonalcoholic     U/S 89/[facility]    Furuncle     legs    History of Doppler ultrasound 05/29/2011    No hemodynamically significant carotid stenosis is identified. A thyroid nodule on each side. Dedicated ultrasound of thyroid gland is suggested to further evaluate if clinically indicated.      History of pulmonary embolism 2007    s/p GFF, related to knee surgery    Hx of blood clots     leg and lung    Hyperlipidemia     severely elevated triglycerides    Hypertension     diastolic    Intracranial arachnoid cyst 11/2012    Dr. Hoang Duong drained, complicated with seizures, DKA    Irritable bowel syndrome     Liver disease     Clemetine  - elevated LFT - positive smooth muscle antibody, steatosis per liver bx 3/2014 with Dr. Cassy Tirado (multiple drug resistant organisms) resistance 2012    MRSA (methicillin resistant staph aureus) culture positive     h/o in foot and before brain surgery    Nephrolithiasis     noted on CT abdomen 9/2016, 6/2019    Neuropathy     Pancreatic insufficiency     Positive SANDY (antinuclear antibody)     Dr. Janice Esparza - first visit in 6/2013    Prolonged emergence from general anesthesia     Restless legs syndrome     Seizures ([address]     started with brain surgery    Sinus tachycardia     Holter 3/2013 - seeing Dr. Layla Flowesr Skull fracture [facility])     clips     Sleep apnea     positive sleep apnea per study 10/2020.  Systolic dysfunction     \"systolic bridge\"  EF 34% ECHO 9/2019    Type II or unspecified type diabetes mellitus without mention of complication, not stated as uncontrolled 2007       Past Surgical History:   Procedure Laterality Date    CARDIAC CATHETERIZATION      2011,5-6 years ago   [address]  3-2012   [address]  04/28/2016    [address]     CARPAL TUNNEL RELEASE  01/2017    CHOLECYSTECTOMY  2004    COLONOSCOPY  2012    COLONOSCOPY  08/2016    2 tubular adenomas - reportedly needs repeat scope in 6 months    CRANIOTOMY  11/2012    arachnoid cyst drainage    EKG 12-LEAD  10/18/2015         ENDOSCOPY, COLON, DIAGNOSTIC      HERNIA REPAIR Right 1996    [facility]--Dr. Pool Johnson INGUINAL HERNIA REPAIR Right 09/15/2016    Robotic assisted    KNEE ARTHROSCOPY Right 2016    KNEE SURGERY Left 2007    acl and debrided twice    OTHER SURGICAL HISTORY  5-31-11    Tilt table was associated w/ nonspecific symptoms or nausea, otherwise no significant dizziness or syncope. No significant hemodynamic changes. Otherwise, unremarkable tilt table test after 30 minutes of tilting.      OTHER SURGICAL HISTORY  01/20/2017    RIGHT SHOULDER ARTHROSCOPY, OPEN STAN, OPEN ACROMIOPLASTY, BICEP TENDESIS, RIGHT CARPAL TUNNEL RELEASE    SHOULDER SURGERY Right 01/2007    THYROID LOBECTOMY N/A 1/15/2021    THYROID LOBECTOMY, UVULOPALATOPHARYNGOPLAST LAOP performed by Rehana Wilson MD at [facility] ECHOCARDIOGRAM  3-05-11    LV size and systolic function normal. EF 55-65%.  UPPER GASTROINTESTINAL ENDOSCOPY  2012    UPPER GASTROINTESTINAL ENDOSCOPY  2016    Dr. Domenico Becker Left 10/22/2019    EGD DILATION SAVORY performed by Phil Rosenbaum MD at [facility] ENDOSCOPY Left 10/22/2019    EGD BIOPSY performed by Phil Rosenbaum MD at [facility] Endoscopy   [address] N/A 11/16/2021    EGD performed by Phil Rosenbaum MD at [facility] ENDOSCOPY Left 11/16/2021    EGD BIOPSY performed by Phil Rosenbaum MD at [address]  2007       Current Outpatient Medications   Medication Sig Dispense Refill    sucralfate (CARAFATE) 1 GM tablet Take 1 g by mouth 4 times daily      doxycycline hyclate (VIBRAMYCIN) 100 MG capsule Take 100 mg by mouth 2 times daily      metoprolol tartrate (LOPRESSOR) 25 MG tablet Take 1 tablet by mouth 2 times daily 180 tablet 1    rivaroxaban (XARELTO) 20 MG TABS tablet Take 1 tablet by mouth daily (with breakfast) 90 tablet 1    rivaroxaban (XARELTO) 15 MG TABS tablet Take 1 tablet by mouth 2 times daily (with meals) for 21 days Xarelto 15 mg BID x 21 days then 20 mg QD 42 tablet 0    gabapentin (NEURONTIN) 600 MG tablet Take 1 tablet by mouth 4 times daily for 90 days.  360 tablet 0    rOPINIRole (REQUIP) 1 MG tablet TAKE 1 TABLET BY MOUTH THREE TIMES A  tablet 1    primidone (MYSOLINE) 50 MG tablet Take 1 tablet by mouth 3 times daily 90 tablet 3    ARIPiprazole (ABILIFY) 5 MG tablet       vitamin B-12 (CYANOCOBALAMIN) 1000 MCG tablet Take 1,000 mcg by mouth 2 times daily       promethazine (PHENERGAN) 12.5 MG tablet Take 1-2 tablets by mouth every 8 hours as needed for Nausea 60 tablet 1    insulin glargine (BASAGLAR KWIKPEN) 100 UNIT/ML injection pen Inject 55 Units into the skin 2 times daily       DULoxetine (CYMBALTA) 60 MG extended release capsule Take 120 mg by mouth daily      blood glucose test strips (ONETOUCH ULTRA) strip 1 each by In Vitro route 3 times daily DX: E11.65 Dispense Onetouch Ultra 2 test strips 300 each 3    levETIRAcetam (KEPPRA) 1000 MG tablet Take 2 tablets by mouth 2 times daily 60 tablet 3    zonisamide (ZONEGRAN) 100 MG capsule Take 5 capsules by mouth nightly (Patient taking differently: Take 600 mg by mouth nightly ) 30 capsule 3    insulin lispro (HUMALOG) 100 UNIT/ML injection vial Takes 10 units with meals plus group 2 sliding scale      pantoprazole (PROTONIX) 40 MG tablet Take 1 tablet by mouth 2 times daily 180 tablet 1    vitamin D (ERGOCALCIFEROL) 1.25 MG (76245 UT) CAPS capsule Take 1 capsule by mouth once a week (Patient taking differently: Take 50,000 Units by mouth once a week Monday) 12 capsule 1    lacosamide (VIMPAT) 100 MG TABS tablet Take 200 mg by mouth 2 times daily.  sildenafil (VIAGRA) 100 MG tablet Take 1 tablet by mouth as needed for Erectile Dysfunction 45 tablet 3     No current facility-administered medications for this visit.        Allergies   Allergen Reactions    Ciprofloxacin-Ciproflox Hcl Er Other (See Comments)     Elevated creatinine    Heparin      Monitor for thrombocytopenia    Metformin Nausea Only     Abdominal pain, diarrhea    Niacin And Related Other (See Comments)     Burning sensation, severe flushing    Tramadol Hcl      Contraindication to seizure medications    Ultram [Tramadol]      Contraindication to seizure medications    Warfarin      Very difficult to regulate in past       Social History     Socioeconomic History    Marital status:      Spouse name: Not on file    Number of children: 3    Years of education: Not on file    Highest education level: Not on file   Occupational History    Occupation: Disability since 2011   Tobacco Use    Smoking status: Never Smoker    Smokeless tobacco: Never Used   Vaping Use    Vaping exertion  Gastrointestinal ROS: no abdominal pain, change in bowel habits, or black or bloody stools  Genito-Urinary ROS: no dysuria, trouble voiding, or hematuria  Musculoskeletal ROS: negative for - muscle pain or muscular weakness  Neurological ROS: negative for - headaches, or weakness  Dermatological ROS: negative for - rash or skin lesion changes, except for lesions on extremities    Vitals:    01/13/22 1414   BP: 120/84   Site: Left Upper Arm   Position: Sitting   Cuff Size: Large Adult   Pulse: 96   Temp: 98 °F (36.7 °C)     Physical Examination: General appearance -alert, well appearing, and in no distress  Neck - supple, no significant adenopathy  Chest - clear to auscultation, no wheezes, rales or rhonchi, symmetric air entry  Heart - normal rate, regular rhythm, normal S1, S2, no murmurs, rubs, clicks or gallops  Abdomen -soft, nontender, nondistended  Neurological - alert, normal speech  Extremities - peripheral pulses normal, +1 LE edema, no clubbing or cyanosis  Skin - warm and dry  Defer foot exam to podiatry    Diagnostic data:   I have reviewed recent diagnostic testing including labs 12/2021, CXR, CTA chest and venous doppler of LE with patient today.   Results for orders placed or performed during the hospital encounter of 98/16/66   Basic Metabolic Panel w/ Reflex to MG   Result Value Ref Range    Sodium 136 135 - 145 meq/L    Potassium reflex Magnesium 4.0 3.5 - 5.2 meq/L    Chloride 102 98 - 111 meq/L    CO2 24 23 - 33 meq/L    Glucose 308 (H) 70 - 108 mg/dL    BUN 8 7 - 22 mg/dL    CREATININE 0.8 0.4 - 1.2 mg/dL    Calcium 8.7 8.5 - 10.5 mg/dL   Brain Natriuretic Peptide   Result Value Ref Range    Pro-BNP 17.9 0.0 - 900.0 pg/mL   CBC Auto Differential   Result Value Ref Range    WBC 7.4 4.8 - 10.8 thou/mm3    RBC 4.69 (L) 4.70 - 6.10 mill/mm3    Hemoglobin 14.8 14.0 - 18.0 gm/dl    Hematocrit 42.2 42.0 - 52.0 %    MCV 90.0 80.0 - 94.0 fL    MCH 31.6 26.0 - 33.0 pg    MCHC 35.1 32.2 - 35.5 gm/dl    RDW-CV 12.6 11.5 - 14.5 %    RDW-SD 41.0 35.0 - 45.0 fL    Platelets 276 106 - 004 thou/mm3    MPV 9.9 9.4 - 12.4 fL    Seg Neutrophils 80.2 %    Lymphocytes 9.6 %    Monocytes 7.6 %    Eosinophils 1.4 %    Basophils 0.3 %    Immature Granulocytes 0.9 %    Segs Absolute 5.9 1.8 - 7.7 thou/mm3    Lymphocytes Absolute 0.7 (L) 1.0 - 4.8 thou/mm3    Monocytes Absolute 0.6 0.4 - 1.3 thou/mm3    Eosinophils Absolute 0.1 0.0 - 0.4 thou/mm3    Basophils Absolute 0.0 0.0 - 0.1 thou/mm3    Immature Grans (Abs) 0.07 0.00 - 0.07 thou/mm3    nRBC 0 /100 wbc   Troponin   Result Value Ref Range    Troponin T < 0.010 ng/ml   Anion Gap   Result Value Ref Range    Anion Gap 10.0 8.0 - 16.0 meq/L   Glomerular Filtration Rate, Estimated   Result Value Ref Range    Est, Glom Filt Rate >90 ml/min/1.73m2   Osmolality   Result Value Ref Range    Osmolality Calc 281.9 275.0 - 300.0 mOsmol/kg   EKG 12 Lead   Result Value Ref Range    Ventricular Rate 94 BPM    Atrial Rate 94 BPM    P-R Interval 182 ms    QRS Duration 78 ms    Q-T Interval 356 ms    QTc Calculation (Bazett) 445 ms    P Axis 32 degrees    R Axis 42 degrees    T Axis 26 degrees     *Note: Due to a large number of results and/or encounters for the requested time period, some results have not been displayed. A complete set of results can be found in Results Review. Assessment and Plan:     Diagnosis Orders   1. Acute deep vein thrombosis (DVT) of right peroneal vein (HCC)  rivaroxaban (XARELTO) 20 MG TABS tablet   2. Type 2 diabetes mellitus with diabetic polyneuropathy, with long-term current use of insulin ([facility])  Microalbumin / Creatinine Urine Ratio   3. Partial seizures ([address]     4. Hyperammonemia ([address]     5. Chronic skin ulcer, limited to breakdown of skin ([address]     6. Bipolar disorder, current episode depressed, mild or moderate severity, unspecified ([address]     7.  Sinus tachycardia  metoprolol tartrate (LOPRESSOR) 25 MG tablet     Acute DVT - continue Xarelto and will give samples and get assistance. Avoiding Coumadin with the extreme difficulty managing INR last time. No excessive bleeding or signs of CVA on Xarelto. Will discuss if need long term treatment vs just 3 months at next visit. Discussed diabetic diet - limit pop, sweets, carbs. Increase exercise as tolerates once feet healed. DM - uncontrolled - need to increase Basaglar as increased po intake. Get special shoes with ulcers, neuropathy. Need eye exam when ulcers get controlled. Microalbumin due now. Partial seizures/tremors - improved off Depakote and on primidone. Continue to defer to neurology. Hyperammonemia - GI is following ammonia levels - cognitively this is the best I have seen him in months. Chronic ulcers- intermittent - defer to podiatry. Bipolar - stable, defer to psychiatry. Sinus tachycardia - continue metoprolol, controlled. Follow up visit in 6 weeks - look at chronic issues. Maryam Ramirez MD    [address] INTERNAL MEDICINE  [address]  Dept: [phone]  Dept Fax: [phone] : [phone]

## 2022-01-17 ENCOUNTER — CARE COORDINATION (OUTPATIENT)
Dept: CARE COORDINATION | Age: 52
End: 2022-01-17

## 2022-01-17 ENCOUNTER — TELEPHONE (OUTPATIENT)
Dept: NEUROLOGY | Age: 52
End: 2022-01-17

## 2022-01-17 DIAGNOSIS — G40.219 PARTIAL SYMPTOMATIC EPILEPSY WITH COMPLEX PARTIAL SEIZURES, INTRACTABLE, WITHOUT STATUS EPILEPTICUS (HCC): Primary | ICD-10-CM

## 2022-01-17 NOTE — TELEPHONE ENCOUNTER
Pt called in stating he had a bad seizure on Friday. He put his daughter on the phone to explain what happened during the seizure. She said that he took his medication and went to lay down. She went to take her son to the . When she got home pt was yelling for her son to lay down. She went in and told him that they were the only ones in the house. He did fall twice since the seizure. She gave him an Ativan. She did say that his BS are normal. He didn't have any terrible tremors with this seizure. He was just more incoherent. She said that he hasn't really hallucinated that bad either. She is not aware of him missing any doses of his medication. She said the full episode lasted about 24 hours. She said that the next morning he was still unsteady and just feeling off.

## 2022-01-18 NOTE — CARE COORDINATION
Patient called needing assistance on his insulins, I have tried to help him in the past and he has failed to return any information. He stated he will get it returned this time, I am mailing him his portion and faxing the MD theirs.         [facility]   Medication Assistance  [address] [facility], and [employer]    J) [phone] (T) [phone]

## 2022-01-26 ENCOUNTER — CARE COORDINATION (OUTPATIENT)
Dept: CARE COORDINATION | Age: 52
End: 2022-01-26

## 2022-01-26 NOTE — CARE COORDINATION
I received the providers portion of the [employer] application back, just waiting on Issa Salgado to return his information.         [facility]   Medication Assistance  [address] [facility], and [employer]    (N) [phone]  (N) [phone]

## 2022-01-28 ENCOUNTER — CARE COORDINATION (OUTPATIENT)
Dept: CARE COORDINATION | Age: 52
End: 2022-01-28

## 2022-01-28 NOTE — CARE COORDINATION
Patient applications for the PAP programs for 2022 were completed and faxed into the manufacturers for review. Once we get notice of an approval or denial I will be in touch with the patient.            [facility]   Medication Assistance  [address] [facility], and [employer]    M) [phone] (W) [phone]

## 2022-02-03 ENCOUNTER — CARE COORDINATION (OUTPATIENT)
Dept: CARE COORDINATION | Age: 52
End: 2022-02-03

## 2022-02-03 NOTE — CARE COORDINATION
I called to check on the status of his application and it is still pending, they hope to have an answer next week.       [address] [place]   Medication Assistance  [address] [facility], and [employer]    (M) [phone]  (W) [phone]

## 2022-02-07 ENCOUNTER — TELEPHONE (OUTPATIENT)
Dept: INTERNAL MEDICINE CLINIC | Age: 52
End: 2022-02-07

## 2022-02-07 NOTE — TELEPHONE ENCOUNTER
Per verbal order Dr. Lazo Likens  need to keep leg elevated and can use compression stocking or ace wrap . If does not improve may need to repeat doppler.

## 2022-02-07 NOTE — TELEPHONE ENCOUNTER
Patient called stating that the past 4 days he has had increased swelling in his right foot and lower leg . Patient states there is no redness but this is the leg he has the DVT in  . Patient has been trying to stay off of it as much as he can . Any further instructions ?

## 2022-02-10 ENCOUNTER — TELEPHONE (OUTPATIENT)
Dept: INTERNAL MEDICINE CLINIC | Age: 52
End: 2022-02-10

## 2022-02-10 ENCOUNTER — CARE COORDINATION (OUTPATIENT)
Dept: CARE COORDINATION | Age: 52
End: 2022-02-10

## 2022-02-10 ENCOUNTER — TELEPHONE (OUTPATIENT)
Dept: NEUROLOGY | Age: 52
End: 2022-02-10

## 2022-02-10 NOTE — CARE COORDINATION
Obie Mcnamara called me back and I went over the [employer] with him. I will discharge the patient from the medication assistance program now that he is enrolled.

## 2022-02-10 NOTE — TELEPHONE ENCOUNTER
Hector called in stating he had a seizure this morning and yesterday. He said he blacked out and had rolling of the eyes, he got up and stumbled walked while in the seizure as well which he said was odd. Yesterday's seizure included right eye drooping and jerking and lasted longer. He did not have a witness. He stated today's seizure caused him to flip over a shower chair and he fell and hit his head and back. He said he was prescribed antibiotics about 2 weeks ago and he still has an open wound on his foot. He states that he is sleeping maybe 1.5 hours at a time then waking for several hours then repeating the pattern. He doesn't think anything else is different in his daily life.  Please advise

## 2022-02-10 NOTE — TELEPHONE ENCOUNTER
Patient called stating that Machelle Hamiltoners from patient assistance wanted him to check and see if Pradaxa could be used to replace the Xarelto . Please advise ?

## 2022-02-10 NOTE — CARE COORDINATION
I called and checked on the status of his application for assistance for his insulins through [employer] [facility]. He was approved and I called [employer] [facility] Rx program and they have a shipment going to him tomorrow. I tried to call the patient to inform him but had to leave a message.         [address] [facility]   Medication Assistance  [address] [facility], and [employer]    (S) [phone]  (C) [phone]

## 2022-02-15 ENCOUNTER — CARE COORDINATION (OUTPATIENT)
Dept: CARE COORDINATION | Age: 52
End: 2022-02-15

## 2022-02-15 ENCOUNTER — TELEPHONE (OUTPATIENT)
Dept: INTERNAL MEDICINE CLINIC | Age: 52
End: 2022-02-15

## 2022-02-15 ENCOUNTER — HOSPITAL ENCOUNTER (OUTPATIENT)
Age: 52
Discharge: HOME | End: 2022-02-15
Payer: MEDICARE

## 2022-02-15 DIAGNOSIS — G40.219 PARTIAL SYMPTOMATIC EPILEPSY WITH COMPLEX PARTIAL SEIZURES, INTRACTABLE, WITHOUT STATUS EPILEPTICUS (HCC): ICD-10-CM

## 2022-02-15 PROCEDURE — 80177 DRUG SCRN QUAN LEVETIRACETAM: CPT

## 2022-02-15 PROCEDURE — G0480 DRUG TEST DEF 1-7 CLASSES: HCPCS

## 2022-02-15 PROCEDURE — 36415 COLL VENOUS BLD VENIPUNCTURE: CPT

## 2022-02-15 PROCEDURE — 80203 DRUG SCREEN QUANT ZONISAMIDE: CPT

## 2022-02-15 NOTE — TELEPHONE ENCOUNTER
Thank you just make sure you only fax the applications back to our program at [phone] and not the .

## 2022-02-15 NOTE — TELEPHONE ENCOUNTER
I will get started on the application for assistance for the Pradaxa, can you please send a script to his pharmacy. I have a 1 month free voucher I can call in for him so he can start it at no charge.

## 2022-02-15 NOTE — CARE COORDINATION
okayed patient to switch from Xarelto to Pradaxa, I started the PAP application for Pradaxa. I faxed the office their portion and I will mail Nataly Castrejon his portion. I did request the office send in a script for the Pradaxa as I have a 1 month free voucher for the patient. I left the patient a message to please call me back.         [address] [facility] Special[facility]  Medication Assistance  [address] [place] [address], and [employer]    (D) [phone]  (S) [phone]

## 2022-02-15 NOTE — TELEPHONE ENCOUNTER
Lab orders placed. I called pt and informed him. He will go to [facility].

## 2022-02-17 LAB
KEPPRA: 47 UG/ML (ref 12–46)
LACOSAMIDE: 8.8 UG/ML (ref 1–10)
ZONISAMIDE LEVEL: 24 UG/ML (ref 10–40)

## 2022-02-20 RX ORDER — DABIGATRAN ETEXILATE 150 MG/1
150 CAPSULE, COATED PELLETS ORAL 2 TIMES DAILY
Qty: 60 CAPSULE | Refills: 0 | Status: ON HOLD | OUTPATIENT
Start: 2022-02-20 | End: 2022-06-14 | Stop reason: HOSPADM

## 2022-02-21 ENCOUNTER — OFFICE VISIT (OUTPATIENT)
Dept: INTERNAL MEDICINE CLINIC | Age: 52
End: 2022-02-21
Payer: MEDICARE

## 2022-02-21 VITALS
HEIGHT: 74 IN | TEMPERATURE: 97 F | BODY MASS INDEX: 36.96 KG/M2 | HEART RATE: 76 BPM | DIASTOLIC BLOOD PRESSURE: 80 MMHG | WEIGHT: 288 LBS | SYSTOLIC BLOOD PRESSURE: 110 MMHG

## 2022-02-21 DIAGNOSIS — I82.451 ACUTE DEEP VEIN THROMBOSIS (DVT) OF RIGHT PERONEAL VEIN (HCC): ICD-10-CM

## 2022-02-21 DIAGNOSIS — L03.90 MRSA CELLULITIS: Primary | ICD-10-CM

## 2022-02-21 DIAGNOSIS — E11.42 TYPE 2 DIABETES MELLITUS WITH DIABETIC POLYNEUROPATHY, WITH LONG-TERM CURRENT USE OF INSULIN (HCC): ICD-10-CM

## 2022-02-21 DIAGNOSIS — B95.62 MRSA CELLULITIS: Primary | ICD-10-CM

## 2022-02-21 DIAGNOSIS — R11.2 NON-INTRACTABLE VOMITING WITH NAUSEA, UNSPECIFIED VOMITING TYPE: ICD-10-CM

## 2022-02-21 DIAGNOSIS — E66.9 OBESITY (BMI 30.0-34.9): ICD-10-CM

## 2022-02-21 DIAGNOSIS — Z79.4 TYPE 2 DIABETES MELLITUS WITH DIABETIC POLYNEUROPATHY, WITH LONG-TERM CURRENT USE OF INSULIN (HCC): ICD-10-CM

## 2022-02-21 LAB — HBA1C MFR BLD: 7.7 % (ref 4.3–5.7)

## 2022-02-21 PROCEDURE — 2022F DILAT RTA XM EVC RTNOPTHY: CPT | Performed by: INTERNAL MEDICINE

## 2022-02-21 PROCEDURE — 99214 OFFICE O/P EST MOD 30 MIN: CPT | Performed by: INTERNAL MEDICINE

## 2022-02-21 PROCEDURE — G8484 FLU IMMUNIZE NO ADMIN: HCPCS | Performed by: INTERNAL MEDICINE

## 2022-02-21 PROCEDURE — 1036F TOBACCO NON-USER: CPT | Performed by: INTERNAL MEDICINE

## 2022-02-21 PROCEDURE — G8427 DOCREV CUR MEDS BY ELIG CLIN: HCPCS | Performed by: INTERNAL MEDICINE

## 2022-02-21 PROCEDURE — G8417 CALC BMI ABV UP PARAM F/U: HCPCS | Performed by: INTERNAL MEDICINE

## 2022-02-21 PROCEDURE — 3017F COLORECTAL CA SCREEN DOC REV: CPT | Performed by: INTERNAL MEDICINE

## 2022-02-21 PROCEDURE — 83036 HEMOGLOBIN GLYCOSYLATED A1C: CPT | Performed by: INTERNAL MEDICINE

## 2022-02-21 PROCEDURE — 3051F HG A1C>EQUAL 7.0%<8.0%: CPT | Performed by: INTERNAL MEDICINE

## 2022-02-21 RX ORDER — PROMETHAZINE HYDROCHLORIDE 12.5 MG/1
12.5-25 TABLET ORAL EVERY 8 HOURS PRN
Qty: 60 TABLET | Refills: 1 | Status: SHIPPED | OUTPATIENT
Start: 2022-02-21 | End: 2022-04-26 | Stop reason: SDUPTHER

## 2022-02-21 RX ORDER — PRIMIDONE 50 MG/1
50 TABLET ORAL 3 TIMES DAILY
COMMUNITY
End: 2022-05-16

## 2022-02-21 RX ORDER — BLOOD SUGAR DIAGNOSTIC
1 STRIP MISCELLANEOUS
Qty: 300 EACH | Refills: 3 | Status: SHIPPED | OUTPATIENT
Start: 2022-02-21 | End: 2022-02-21 | Stop reason: SDUPTHER

## 2022-02-21 NOTE — PROGRESS NOTES
1970    Chief Complaint   Patient presents with    Diabetes     6 weeks     Other     DVT , chronic skin ulcers       Pt is a 46 y.o. male who presents for    HPI    New area of redness and drainage from right forearm started 4 days ago, drained pus yesterday. Off antibiotic x 2-3 weeks. Last antibiotic was doxycycline. I am concerned that he is having multiple abscesses in legs/feet, now has started on arm. Will send to ID for evaluation. I have tried to educate him to control DM, to decrease chance of infection. But wonder if something is underlying these infections that we should focus on. Weight up 28# in 3 months with decreased activity. States he is not overeating. Can't do much exercise with ulcer on right foot. In walking boot now. Still seeing Dr. Donn Valero (podiatry) every 3 weeks. Need to get records from Dr. Donn Valero on cultures and notes. Patient states all are MRSA. He switched Neurology to [place]. He is having head numbness which is a precursor to seizure, he has Ativan to use prn - taking a couple x a week. They started Primidone for tremor and has improved. Stopped Depakote with elevated ammonia levels. DM- HgA1c 7.7 today - FSBS very high this past 2 weeks (possibly related to infection above). He is on Basaglar 65 Units BID and Humalog per his own SSI - ie 280 - 28 Units, 350 - 35 units. 500 -50 units. 150 takes 15. He sporatically misses long acting. Not much. Will decrease to 55 Units if running low. Not consistently taking short acting. Not eating consistently due to stomach/diarrhea issues, and seizure issues. Consider CGM - recommended 4x daily monitoring especially with uncontrolled FSBS. Most recent eye exam: 6/2/20 - Dr. Fuentes Gandhi. Minimal/mild DM retinopathy. Will get eye exam when other medical issues more stable. Renal function - normal - eGFR >90 12/30/21. Microalbumin/creatinine ratio: 59 on 12/29/2020.   Order to have this repeated given at last visit but not done yet. Positive new foot lesions as above, positive peripheral neuropathy - severe. LDL 70 4/2021 at goal - not on statin. Autonomic dysfunction -as above. He typically eats twice a day - suggested eating 3 x a day and use insulin for all meals. Checking FSBS 2-3x daily. He is looking into alternative ways to obtain cheaper medications. He has read about Creta Leyden cost plus drug [address] - he will research and let me know. DVT in peroneal vein on right 12/29/21 - started on Xarelto. Ask Roseanna Washington for help with insulin and Xarelto cost.  He had an ACL repair and infection after, then had DVT, PICC then DVT in arm then PE. But all was a provoked episode. Cost is big issue for treatment and Coumadin was difficult to get a consistent level the last time he was treated. Now on Pradaxa as can get patient assistance with this medication. Right foot edema - need to wrap and elevate. He doesn't want to do this. GI - stop Aleve that he is currently taking for foot pain due to being on Xarelto and stomach upset. He is better now that he is on carafate pills -following with MAREN. GI watching ammonia levels. Fatty liver but no fibrosis - Fibroscan due in 6 mo again -?around 7/2022. Past Medical History:   Diagnosis Date    Abnormal thyroid biopsy     s/p left hemithyroidectomy 5/1848 - follicular adenoma    Acid reflux     Anemia     resolved - previously seen by Dr. Suki Urbina (due to enlarged spleen)    Anesthesia     seizures with brain surgery due to blood sugar got really high    Bile reflux gastritis     per EGD 11/16/21 - Dr. Rodri Woodward - to start Carafate.     Bipolar disorder ([facility]     Blood clot in vein 04/07/2016    Carmine Sims     Cancer [facility]) 04/2019    thyroid    Chest pain     previously seeing [facility] cardiologist, now seeing Dr. Crystal Levy (LAD bridging on cath 4/2016)    Chronic back pain     Chronic bronchitis ([facility]     Dr. Artemio Jacobs Colon polyp 08/30/2016    Depression     seeing Erica Records DVT (deep venous thrombosis) ([facility] [address] [id number]    not on Coumadin due to unable to regulate - Dr. Anabel Joshua - no etiology found per patient    Esophageal abnormality     nodule     Fatty liver disease, nonalcoholic     [facility]/S 63/[facility]    Furuncle     legs    History of Doppler ultrasound 05/29/2011    No hemodynamically significant carotid stenosis is identified. A thyroid nodule on each side. Dedicated ultrasound of thyroid gland is suggested to further evaluate if clinically indicated.  History of pulmonary embolism 2007    s/p GFF, related to knee surgery    Hx of blood clots     leg and lung    Hyperlipidemia     severely elevated triglycerides    Hypertension     diastolic    Intracranial arachnoid cyst 11/2012    Dr. Shipman Sang drained, complicated with seizures, DKA    Irritable bowel syndrome     Liver disease     [place] - elevated LFT - positive smooth muscle antibody, steatosis per liver bx 3/2014 with Dr. Yeny Alegria (multiple drug resistant organisms) resistance 2012    MRSA (methicillin resistant staph aureus) culture positive     h/o in foot and before brain surgery    Nephrolithiasis     noted on CT abdomen 9/2016, 6/2019    Neuropathy     Pancreatic insufficiency     Positive SANDY (antinuclear antibody)     Dr. Remi Hollis - first visit in 6/2013    Prolonged emergence from general anesthesia     Restless legs syndrome     Seizures ([address]     started with brain surgery    Sinus tachycardia     Holter 3/2013 - seeing Dr. Eran Melvin fracture [facility])     clips     Sleep apnea     positive sleep apnea per study 10/2020.     Systolic dysfunction     \"systolic bridge\"  EF 48% ECHO 9/2019    Type II or unspecified type diabetes mellitus without mention of complication, not stated as uncontrolled 2007       Past Surgical History:   Procedure Laterality Date    CARDIAC CATHETERIZATION      2011,5-6 years ago   [facility] CATHETERIZATION  3-2012    CARDIAC CATHETERIZATION  04/28/2016    [address] RELEASE  01/2017    CHOLECYSTECTOMY  2004    COLONOSCOPY  2012    COLONOSCOPY  08/2016    2 tubular adenomas - reportedly needs repeat scope in 6 months    CRANIOTOMY  11/2012    arachnoid cyst drainage    EKG 12-LEAD  10/18/2015         ENDOSCOPY, COLON, DIAGNOSTIC      HERNIA REPAIR Right 1996    [facility]--Dr. Adam Rodrigues INGUINAL HERNIA REPAIR Right 09/15/2016    Robotic assisted    KNEE ARTHROSCOPY Right 2016    KNEE SURGERY Left 2007    acl and debrided twice    OTHER SURGICAL HISTORY  5-31-11    Tilt table was associated w/ nonspecific symptoms or nausea, otherwise no significant dizziness or syncope. No significant hemodynamic changes. Otherwise, unremarkable tilt table test after 30 minutes of tilting.  OTHER SURGICAL HISTORY  01/20/2017    RIGHT SHOULDER ARTHROSCOPY, OPEN STAN, OPEN ACROMIOPLASTY, BICEP TENDESIS, RIGHT CARPAL TUNNEL RELEASE    SHOULDER SURGERY Right 01/2007    THYROID LOBECTOMY N/A 1/15/2021    THYROID LOBECTOMY, UVULOPALATOPHARYNGOPLAST LAOP performed by Fang Kendall MD at [address] [facility] ECHOCARDIOGRAM  3-05-11    LV size and systolic function normal. EF 55-65%.      UPPER GASTROINTESTINAL ENDOSCOPY  2012    UPPER GASTROINTESTINAL ENDOSCOPY  2016    Dr. Mahad Crawford Left 10/22/2019    EGD DILATION SAVORY performed by Carl Oropeza MD at [facility] ENDOSCOPY Left 10/22/2019    EGD BIOPSY performed by Carl Oropeza MD at [facility] N/A 11/16/2021    EGD performed by Carl Oropeza MD at [facility] ENDOSCOPY Left 11/16/2021    EGD BIOPSY performed by Carl Oropeza MD at [address]  2007       Current Outpatient Medications   Medication Sig Dispense Refill    primidone (MYSOLINE) 50 MG tablet Take 50 mg by mouth 3 times daily      promethazine (PHENERGAN) 12.5 MG tablet Take 1-2 tablets by mouth every 8 hours as needed for Nausea 60 tablet 1    dabigatran (PRADAXA) 150 MG capsule Take 1 capsule by mouth 2 times daily 60 capsule 0    sucralfate (CARAFATE) 1 GM tablet Take 1 g by mouth 4 times daily      metoprolol tartrate (LOPRESSOR) 25 MG tablet Take 1 tablet by mouth 2 times daily 180 tablet 1    gabapentin (NEURONTIN) 600 MG tablet Take 1 tablet by mouth 4 times daily for 90 days. 360 tablet 0    rOPINIRole (REQUIP) 1 MG tablet TAKE 1 TABLET BY MOUTH THREE TIMES A  tablet 1    ARIPiprazole (ABILIFY) 5 MG tablet Take 10 mg by mouth daily       vitamin B-12 (CYANOCOBALAMIN) 1000 MCG tablet Take 1,000 mcg by mouth 2 times daily       insulin glargine (BASAGLAR KWIKPEN) 100 UNIT/ML injection pen Inject 55 Units into the skin 2 times daily       DULoxetine (CYMBALTA) 60 MG extended release capsule Take 120 mg by mouth daily      levETIRAcetam (KEPPRA) 1000 MG tablet Take 2 tablets by mouth 2 times daily 60 tablet 3    zonisamide (ZONEGRAN) 100 MG capsule Take 5 capsules by mouth nightly (Patient taking differently: Take 600 mg by mouth nightly ) 30 capsule 3    insulin lispro (HUMALOG) 100 UNIT/ML injection vial Takes 10 units with meals plus group 2 sliding scale      pantoprazole (PROTONIX) 40 MG tablet Take 1 tablet by mouth 2 times daily 180 tablet 1    vitamin D (ERGOCALCIFEROL) 1.25 MG (06235 UT) CAPS capsule Take 1 capsule by mouth once a week (Patient taking differently: Take 50,000 Units by mouth once a week Monday) 12 capsule 1    lacosamide (VIMPAT) 100 MG TABS tablet Take 200 mg by mouth 2 times daily.  sildenafil (VIAGRA) 100 MG tablet Take 1 tablet by mouth as needed for Erectile Dysfunction 45 tablet 3    amoxicillin-clavulanate (AUGMENTIN) 875-125 MG per tablet TAKE 1 TABLET BY MOUTH EVERY TWELVE HOURS FOR 10 DAYS.       chlorhexidine (HIBICLENS) 4 % external liquid Apply topically daily for 1 week each month. 3790 mL 2    blood glucose test strips (ONETOUCH ULTRA) strip 1 each by In Vitro route 5 times daily DX: E11.65 Dispense Onetouch Ultra 2 test strips 450 each 3    primidone (MYSOLINE) 50 MG tablet Take 1 tablet by mouth 3 times daily 90 tablet 3     No current facility-administered medications for this visit. Allergies   Allergen Reactions    Ciprofloxacin-Ciproflox Hcl Er Other (See Comments)     Elevated creatinine    Heparin      Monitor for thrombocytopenia    Metformin Nausea Only     Abdominal pain, diarrhea    Niacin And Related Other (See Comments)     Burning sensation, severe flushing    Tramadol Hcl      Contraindication to seizure medications    Ultram [Tramadol]      Contraindication to seizure medications    Warfarin      Very difficult to regulate in past    Other      Other reaction(s): very difficult to regulate       Social History     Socioeconomic History    Marital status:      Spouse name: Not on file    Number of children: 3    Years of education: Not on file    Highest education level: Not on file   Occupational History    Occupation: Disability since 2011   Tobacco Use    Smoking status: Never Smoker    Smokeless tobacco: Never Used   Vaping Use    Vaping Use: Never used   Substance and Sexual Activity    Alcohol use: No     Alcohol/week: 0.0 standard drinks    Drug use: No    Sexual activity: Not on file   Other Topics Concern    Not on file   Social History Narrative    Not on file     Social Determinants of Health     Financial Resource Strain: Medium Risk    Difficulty of Paying Living Expenses: Somewhat hard   Food Insecurity: No Food Insecurity    Worried About Running Out of Food in the Last Year: Never true    Jeffry of Food in the Last Year: Never true   Transportation Needs:     Lack of Transportation (Medical): Not on file    Lack of Transportation (Non-Medical):  Not on file Physical Activity:     Days of Exercise per Week: Not on file    Minutes of Exercise per Session: Not on file   Stress:     Feeling of Stress : Not on file   Social Connections:     Frequency of Communication with Friends and Family: Not on file    Frequency of Social Gatherings with Friends and Family: Not on file    Attends Druze Services: Not on file    Active Member of Clubs or Organizations: Not on file    Attends Club or Organization Meetings: Not on file    Marital Status: Not on file   Intimate Partner Violence:     Fear of Current or Ex-Partner: Not on file    Emotionally Abused: Not on file    Physically Abused: Not on file    Sexually Abused: Not on file   Housing Stability:     Unable to Pay for Housing in the Last Year: Not on file    Number of [place] in the Last Year: Not on file    Unstable Housing in the Last Year: Not on file       Review of Systems - General ROS: negative for - chills or fever  Psychological ROS: negative for - anxiety - working with psych and difficult to not be depressed with medical issues. Hematological and Lymphatic ROS: No history of bleeding disorder. Positive DVT as above.   Respiratory ROS: no cough, shortness of breath, or wheezing  Cardiovascular ROS: no chest pain or dyspnea on exertion  Gastrointestinal ROS: no abdominal pain, change in bowel habits, or black or bloody stools  Genito-Urinary ROS: no dysuria, trouble voiding, or hematuria  Musculoskeletal ROS: negative for - muscle pain or muscular weakness  Neurological ROS: negative for - headaches, or weakness  Dermatological ROS: negative for - rash or skin lesion changes, except for lesions on extremities    Vitals:    02/21/22 1138   BP: 110/80   Site: Left Upper Arm   Position: Sitting   Cuff Size: Large Adult   Pulse: 76   Temp: 97 °F (36.1 °C)   Weight: 288 lb (130.6 kg)   Height: 6' 2\" (1.88 m)     Physical Examination: General appearance -alert, well appearing, and in no distress  Neck - supple, no significant adenopathy  Chest - clear to auscultation, no wheezes, rales or rhonchi, symmetric air entry  Heart - normal rate, regular rhythm, normal S1, S2, no murmurs, rubs, clicks or gallops  Abdomen -soft, nontender, nondistended  Neurological - alert, normal speech  Extremities - peripheral pulses normal, +1 LE edema, no clubbing or cyanosis  Skin - warm and dry, right forearm with area of erythema, tenderness, no warmth, no drainage  Defer foot exam to podiatry    Diagnostic data:   I have reviewed recent diagnostic testing including labs - HgA1c today with patient. Results for orders placed or performed in visit on 02/21/22   POCT glycosylated hemoglobin (Hb A1C)   Result Value Ref Range    Hemoglobin A1C 7.7 (H) 4.3 - 5.7 %     *Note: Due to a large number of results and/or encounters for the requested time period, some results have not been displayed. A complete set of results can be found in Results Review. Assessment and Plan:     Diagnosis Orders   1. MRSA cellulitis  External Referral To Infectious Disease   2. Type 2 diabetes mellitus with diabetic polyneuropathy, with long-term current use of insulin ([facility])  POCT glycosylated hemoglobin (Hb A1C)    95031 - Collection Capillary Blood Specimen    DISCONTINUED: blood glucose test strips (ONETOUCH ULTRA) strip   3. Non-intractable vomiting with nausea, unspecified vomiting type  promethazine (PHENERGAN) 12.5 MG tablet   4. Severe obesity (BMI 35.0-39. 9) with comorbidity ([address]       MRSA cellulitis/abscesses -  As area on forearm improving since it drained - clean area well, apply antibiotic ointment and monitor. He has been through multiple rounds of antibiotics since 8/2021 (doxy, Keflex, Bactrim- multiple rounds of each). Refer to ID to see if need additional work up. DM - uncontrolled - need to increase Basaglar as increased po intake. Get special shoes with ulcers, neuropathy.   Need eye exam when ulcers get controlled. Microalbumin due now. Work on eating regularly and taking Humalog 3 x a day. Work on check FSBS 4x a day. Acute DVT - changed Xarelto to Pradaxa - due to cost and patient assistance. He is tolerating well. He is aware not to take NSAIDs. Avoiding Coumadin with the extreme difficulty managing INR last time. No excessive bleeding or signs of CVA. Will discuss if need long term treatment vs just 3 months at next visit. Encouraged wrapping leg in ACE wrap and elevation to help with edema. Nausea - chronic, intermittent - working with GI, refill Phenergan. Obesity - BMI 34.41 - recent weight gain of 28# in 3 months. Most likely due to very limited activity and GI system better. But encouraged balanced diet, exercise when foot heals. Partial seizures/tremors - improved off Depakote and on primidone. Continue to defer to neurology. Hyperammonemia - GI is following ammonia levels - cognitively this is the best I have seen him in months. Chronic ulcers- intermittent - defer to podiatry. Bipolar - stable, defer to psychiatry. Sinus tachycardia - continue metoprolol, controlled. Follow up visit in 8 weeks - look at chronic issues. Maryam Ramirez MD    [address] INTERNAL MEDICINE  [address]  Dept: [phone]  Dept Fax: [phone] : [phone]

## 2022-02-22 RX ORDER — BLOOD SUGAR DIAGNOSTIC
1 STRIP MISCELLANEOUS
Qty: 450 EACH | Refills: 3 | Status: SHIPPED | OUTPATIENT
Start: 2022-02-22

## 2022-03-02 ENCOUNTER — CARE COORDINATION (OUTPATIENT)
Dept: CARE COORDINATION | Age: 52
End: 2022-03-02

## 2022-03-02 NOTE — CARE COORDINATION
I spoke with Rhea Gee to see if he has gotten the paperwork I have sent him on the Pradaxa yet. He stated he has not received it yet but will let me know if he doesn't get it by next week.       [facility]   Medication Assistance  [address] [facility], and [facility] [employer]    (K) [phone]  (S) [phone]

## 2022-03-07 ENCOUNTER — TELEPHONE (OUTPATIENT)
Dept: INTERNAL MEDICINE CLINIC | Age: 52
End: 2022-03-07

## 2022-03-07 DIAGNOSIS — R11.2 NON-INTRACTABLE VOMITING WITH NAUSEA, UNSPECIFIED VOMITING TYPE: ICD-10-CM

## 2022-03-07 DIAGNOSIS — I82.451 ACUTE DEEP VEIN THROMBOSIS (DVT) OF RIGHT PERONEAL VEIN (HCC): ICD-10-CM

## 2022-03-07 DIAGNOSIS — M79.661 RIGHT CALF PAIN: ICD-10-CM

## 2022-03-07 DIAGNOSIS — M79.89 SWELLING OF RIGHT FOOT: Primary | ICD-10-CM

## 2022-03-07 NOTE — TELEPHONE ENCOUNTER
Patient called stating that he is still having significant pain in his right leg with the DVT . Patient states that leg is not swollen but he does have more swelling in his right foot . Patient is asking if it is normsal for him to still be having pain . Please advise. Maria Esther Bedolla

## 2022-03-07 NOTE — TELEPHONE ENCOUNTER
Order Venous doppler of that leg to see if clot progressing. Ask wife if pulses (at ankle and top of foot) are normal and if any color changes (purple, black) to suggest arterial issue.

## 2022-03-08 ENCOUNTER — TELEPHONE (OUTPATIENT)
Dept: INTERNAL MEDICINE CLINIC | Age: 52
End: 2022-03-08

## 2022-03-08 NOTE — TELEPHONE ENCOUNTER
Spoke with Darryle Mart and Deana Hagen is at work . Patient states that there is no discoloration of his right foot just swollen and he continues to have pain . Instructed patient that Dr. Diane Santiago ordered another doppler to make her clot is not progressing . Doppler ordered and Andrei thomas .

## 2022-03-08 NOTE — TELEPHONE ENCOUNTER
Left voicemail that his right leg venous doppler is schedule for 3/9/22 at 10:00am be on 2nd floor heart center at 9:30am.

## 2022-03-09 ENCOUNTER — TELEPHONE (OUTPATIENT)
Dept: INTERNAL MEDICINE CLINIC | Age: 52
End: 2022-03-09

## 2022-03-09 ENCOUNTER — HOSPITAL ENCOUNTER (OUTPATIENT)
Dept: INTERVENTIONAL RADIOLOGY/VASCULAR | Age: 52
Discharge: HOME | End: 2022-03-09
Payer: MEDICARE

## 2022-03-09 DIAGNOSIS — I82.451 ACUTE DEEP VEIN THROMBOSIS (DVT) OF RIGHT PERONEAL VEIN (HCC): ICD-10-CM

## 2022-03-09 DIAGNOSIS — M79.661 RIGHT CALF PAIN: ICD-10-CM

## 2022-03-09 DIAGNOSIS — M79.89 SWELLING OF RIGHT FOOT: ICD-10-CM

## 2022-03-09 PROCEDURE — 93971 EXTREMITY STUDY: CPT

## 2022-03-09 NOTE — TELEPHONE ENCOUNTER
Patient called asking for results of his doppler from today . The [address] [facility] call after doppler and states that she though that the clot is minimally larger and it still looks new in areas. Please advise what I can tell patient .

## 2022-03-09 NOTE — TELEPHONE ENCOUNTER
Per Dr. Ari Gusman looks the same . May sure he is wrapping leg . Can offer appointment to check pulse in right foot.

## 2022-03-10 ENCOUNTER — OFFICE VISIT (OUTPATIENT)
Dept: INFECTIOUS DISEASES | Age: 52
End: 2022-03-10
Payer: MEDICARE

## 2022-03-10 ENCOUNTER — HOSPITAL ENCOUNTER (OUTPATIENT)
Age: 52
Setting detail: SPECIMEN
Discharge: HOME | End: 2022-03-10

## 2022-03-10 VITALS
WEIGHT: 268 LBS | HEIGHT: 74 IN | HEART RATE: 79 BPM | OXYGEN SATURATION: 99 % | BODY MASS INDEX: 34.39 KG/M2 | SYSTOLIC BLOOD PRESSURE: 123 MMHG | DIASTOLIC BLOOD PRESSURE: 78 MMHG

## 2022-03-10 DIAGNOSIS — L08.9 INFECTION OF SKIN DUE TO METHICILLIN RESISTANT STAPHYLOCOCCUS AUREUS (MRSA): Primary | ICD-10-CM

## 2022-03-10 DIAGNOSIS — L97.515 DIABETIC ULCER OF RIGHT FOOT ASSOCIATED WITH TYPE 2 DIABETES MELLITUS, WITH MUSCLE INVOLVEMENT WITHOUT EVIDENCE OF NECROSIS, UNSPECIFIED PART OF FOOT (HCC): ICD-10-CM

## 2022-03-10 DIAGNOSIS — B95.62 INFECTION OF SKIN DUE TO METHICILLIN RESISTANT STAPHYLOCOCCUS AUREUS (MRSA): Primary | ICD-10-CM

## 2022-03-10 DIAGNOSIS — E11.621 DIABETIC ULCER OF RIGHT FOOT ASSOCIATED WITH TYPE 2 DIABETES MELLITUS, WITH MUSCLE INVOLVEMENT WITHOUT EVIDENCE OF NECROSIS, UNSPECIFIED PART OF FOOT (HCC): ICD-10-CM

## 2022-03-10 PROCEDURE — 99203 OFFICE O/P NEW LOW 30 MIN: CPT | Performed by: INTERNAL MEDICINE

## 2022-03-10 PROCEDURE — 1036F TOBACCO NON-USER: CPT | Performed by: INTERNAL MEDICINE

## 2022-03-10 PROCEDURE — G8484 FLU IMMUNIZE NO ADMIN: HCPCS | Performed by: INTERNAL MEDICINE

## 2022-03-10 PROCEDURE — 3017F COLORECTAL CA SCREEN DOC REV: CPT | Performed by: INTERNAL MEDICINE

## 2022-03-10 PROCEDURE — 2022F DILAT RTA XM EVC RTNOPTHY: CPT | Performed by: INTERNAL MEDICINE

## 2022-03-10 PROCEDURE — G8417 CALC BMI ABV UP PARAM F/U: HCPCS | Performed by: INTERNAL MEDICINE

## 2022-03-10 PROCEDURE — G8427 DOCREV CUR MEDS BY ELIG CLIN: HCPCS | Performed by: INTERNAL MEDICINE

## 2022-03-10 PROCEDURE — 3051F HG A1C>EQUAL 7.0%<8.0%: CPT | Performed by: INTERNAL MEDICINE

## 2022-03-10 RX ORDER — AMOXICILLIN AND CLAVULANATE POTASSIUM 875; 125 MG/1; MG/1
TABLET, FILM COATED ORAL
COMMUNITY
Start: 2022-03-07 | End: 2022-05-16

## 2022-03-10 RX ORDER — CHLORHEXIDINE GLUCONATE 213 G/1000ML
SOLUTION TOPICAL
Qty: 3790 ML | Refills: 2 | Status: SHIPPED | OUTPATIENT
Start: 2022-03-10 | End: 2022-03-24

## 2022-03-10 ASSESSMENT — ENCOUNTER SYMPTOMS
RESPIRATORY NEGATIVE: 1
ALLERGIC/IMMUNOLOGIC NEGATIVE: 1
GASTROINTESTINAL NEGATIVE: 1

## 2022-03-10 NOTE — TELEPHONE ENCOUNTER
Spoke with patient and informed him that there was no changes is the DVT . Instructed patient to make sure he is wrapping the right leg with an ace wrap starting at the toes and wrapping up to help get the swelling down  patient has not been wrapping his leg . Offered appointment today but patient has an appointment in [place] with infectious disease . Instructed patient to call for appointment is swelling does not improve or any worsening of symptoms.

## 2022-03-10 NOTE — PROGRESS NOTES
[facility]   Office Consult Note  Today's Date and Time: 3/10/2022, 10:31 AM    Impression:     1. Infection of skin due to methicillin resistant Staphylococcus aureus (MRSA)    2. Diabetic ulcer of right foot associated with type 2 diabetes mellitus, with muscle involvement without evidence of necrosis, unspecified part of foot ([facility]         Recommendations   · At this point time is onset the patient is having recurrent skin and soft tissue infections related to MRSA. · I do not think that the diabetic foot infections and the MRSA are associated. · I will check a nasal PCR today. · I have ordered Hibiclens that he can use for 1 week out of each month to hopefully decrease his MRSA load on the skin and this in turn will reduce the chance of skin infections. · He will follow-up with me on an as-needed basis. I have ordered the following medications/ labs:  No orders of the defined types were placed in this encounter. No orders of the defined types were placed in this encounter. Chief complaint/reason for consultation:     Chief Complaint   Patient presents with    New Patient     MRSA cellulitis bilateral foot and right leg         History of Present Illness:   Sultana Peres is a 46y.o.-year-old male who is seen at there request of Edwar Denton MD   Noy Kate is seen and evaluated at bedside he reports he has had quite a few diabetic foot infections initially started in the left leg/foot and he had wound culture with MRSA. He did undergo wound care and after some time the wound did subsequently healed. The patient then reports that he had about 5 or 6 lesions in the right foot which popped and the culture in December of the right foot did have MRSA.   The patient reports that the infection in the right foot was better the last week he was noted to have some redness and swelling as well as some increasing pain in the right foot and a tissue culture done after debridement she did grow out E. coli and group B streptococcus and therefore was not related to MRSA. The patient has had issues with skin and soft tissue infections and recently had a right forearm pustular lesion, history of groin lesions, some lesions around the nares. The concern is that the patient has had recurrent infections related to MRSA has been treated with oral doxycycline in the past and due to multiple courses of oral doxycycline the patient was sent in to see me for further evaluation. I have personally reviewed the past medical history,past surgical history, medications, social history, and family history, and I have updated the database accordingly. PastMedical History:     Past Medical History:   Diagnosis Date    Abnormal thyroid biopsy     s/p left hemithyroidectomy 9/4520 - follicular adenoma    Acid reflux     Anemia     resolved - previously seen by Dr. Ezekiel Farah (due to enlarged spleen)    Anesthesia     seizures with brain surgery due to blood sugar got really high    Bile reflux gastritis     per EGD 11/16/21 - Dr. Darcy Carvajal - to start Carafate.  Bipolar disorder ([facility] [address]     Blood clot in vein 04/07/2016    [facility]) 04/2019    thyroid    Chest pain     previously seeing [facility] cardiologist, now seeing Dr. Jeanie Han (LAD bridging on cath 4/2016)    Chronic back pain     Chronic bronchitis ([facility] [address]     Dr. Aster Ogden Colon polyp 08/30/2016    Depression     seeing Kayley Cortez DVT (deep venous thrombosis) ([facility] [address] [id number]    not on Coumadin due to unable to regulate - Dr. Jesus Walden - no etiology found per patient    Esophageal abnormality     nodule     Fatty liver disease, nonalcoholic     [facility]    Furuncle     legs    History of Doppler ultrasound 05/29/2011    No hemodynamically significant carotid stenosis is identified. A thyroid nodule on each side. Dedicated ultrasound of thyroid gland is suggested to further evaluate if clinically indicated.      History of pulmonary embolism 2007    s/p GFF, related to knee surgery    Hx of blood clots     leg and lung    Hyperlipidemia     severely elevated triglycerides    Hypertension     diastolic    Intracranial arachnoid cyst 11/2012    Dr. Josefina Mcconnell drained, complicated with seizures, DKA    Irritable bowel syndrome     Liver disease     Florenciojavier Mannie - elevated LFT - positive smooth muscle antibody, steatosis per liver bx 3/2014 with Dr. Valerie Najera (multiple drug resistant organisms) resistance 2012    MRSA (methicillin resistant staph aureus) culture positive     h/o in foot and before brain surgery    Nephrolithiasis     noted on CT abdomen 9/2016, 6/2019    Neuropathy     Pancreatic insufficiency     Positive SANDY (antinuclear antibody)     Dr. Izaiah Friedman - first visit in 6/2013    Prolonged emergence from general anesthesia     Restless legs syndrome     Seizures ([address]     started with brain surgery    Sinus tachycardia     Holter 3/2013 - seeing Dr. Isabel Parks fracture [facility])     clips     Sleep apnea     positive sleep apnea per study 10/2020.     Systolic dysfunction     \"systolic bridge\"  EF 53% ECHO 9/2019    Type II or unspecified type diabetes mellitus without mention of complication, not stated as uncontrolled 2007     Past Surgical  History:     Past Surgical History:   Procedure Laterality Date    CARDIAC CATHETERIZATION      2011,5-6 years ago   [address]  3-2012   [address]  04/28/2016    [address] [place] [address]     CARPAL TUNNEL RELEASE  01/2017    CHOLECYSTECTOMY  2004    COLONOSCOPY  2012    COLONOSCOPY  08/2016    2 tubular adenomas - reportedly needs repeat scope in 6 months    CRANIOTOMY  11/2012    arachnoid cyst drainage    EKG 12-LEAD  10/18/2015         ENDOSCOPY, COLON, DIAGNOSTIC      HERNIA REPAIR Right 1996    [facility]--Dr. Priya Jiménez INGUINAL HERNIA REPAIR Right 09/15/2016    Robotic assisted    KNEE ARTHROSCOPY Right 2016    KNEE SURGERY Left 2007    acl and debrided twice    OTHER SURGICAL HISTORY  5-31-11    Tilt table was associated w/ nonspecific symptoms or nausea, otherwise no significant dizziness or syncope. No significant hemodynamic changes. Otherwise, unremarkable tilt table test after 30 minutes of tilting.  OTHER SURGICAL HISTORY  01/20/2017    RIGHT SHOULDER ARTHROSCOPY, OPEN STAN, OPEN ACROMIOPLASTY, BICEP TENDESIS, RIGHT CARPAL TUNNEL RELEASE    SHOULDER SURGERY Right 01/2007    THYROID LOBECTOMY N/A 1/15/2021    THYROID LOBECTOMY, UVULOPALATOPHARYNGOPLAST LAOP performed by Marya Tapia MD at [facility] ECHOCARDIOGRAM  3-05-11    LV size and systolic function normal. EF 55-65%.      UPPER GASTROINTESTINAL ENDOSCOPY  2012    UPPER GASTROINTESTINAL ENDOSCOPY  2016    Dr. Deion Gibbons Left 10/22/2019    EGD DILATION SAVORY performed by Angelina Koenig MD at [facility] Left 10/22/2019    EGD BIOPSY performed by Angelina Koenig MD at [facility]   Juan Luis Sydneyin N/A 11/16/2021    EGD performed by Angelina Koenig MD at [address] [facility] ENDOSCOPY Left 11/16/2021    EGD BIOPSY performed by Angelina Koenig MD at [address]  2007     Medications:     Current Outpatient Medications   Medication Sig Dispense Refill    blood glucose test strips (ONETOUCH ULTRA) strip 1 each by In Vitro route 5 times daily DX: E11.65 Dispense Onetouch Ultra 2 test strips 450 each 3    primidone (MYSOLINE) 50 MG tablet Take 50 mg by mouth 3 times daily      promethazine (PHENERGAN) 12.5 MG tablet Take 1-2 tablets by mouth every 8 hours as needed for Nausea 60 tablet 1    dabigatran (PRADAXA) 150 MG capsule Take 1 capsule by mouth 2 times daily 60 capsule 0    sucralfate (CARAFATE) 1 GM tablet Take 1 g by mouth 4 times daily      metoprolol tartrate (LOPRESSOR) 25 MG tablet Take 1 tablet by mouth 2 times daily 180 tablet 1    gabapentin (NEURONTIN) 600 MG tablet Take 1 tablet by mouth 4 times daily for 90 days. 360 tablet 0    rOPINIRole (REQUIP) 1 MG tablet TAKE 1 TABLET BY MOUTH THREE TIMES A  tablet 1    ARIPiprazole (ABILIFY) 5 MG tablet Take 10 mg by mouth daily       vitamin B-12 (CYANOCOBALAMIN) 1000 MCG tablet Take 1,000 mcg by mouth 2 times daily       insulin glargine (BASAGLAR KWIKPEN) 100 UNIT/ML injection pen Inject 55 Units into the skin 2 times daily       DULoxetine (CYMBALTA) 60 MG extended release capsule Take 120 mg by mouth daily      levETIRAcetam (KEPPRA) 1000 MG tablet Take 2 tablets by mouth 2 times daily 60 tablet 3    zonisamide (ZONEGRAN) 100 MG capsule Take 5 capsules by mouth nightly (Patient taking differently: Take 600 mg by mouth nightly ) 30 capsule 3    insulin lispro (HUMALOG) 100 UNIT/ML injection vial Takes 10 units with meals plus group 2 sliding scale      pantoprazole (PROTONIX) 40 MG tablet Take 1 tablet by mouth 2 times daily 180 tablet 1    vitamin D (ERGOCALCIFEROL) 1.25 MG (39566 UT) CAPS capsule Take 1 capsule by mouth once a week (Patient taking differently: Take 50,000 Units by mouth once a week Monday) 12 capsule 1    lacosamide (VIMPAT) 100 MG TABS tablet Take 200 mg by mouth 2 times daily.  sildenafil (VIAGRA) 100 MG tablet Take 1 tablet by mouth as needed for Erectile Dysfunction 45 tablet 3    amoxicillin-clavulanate (AUGMENTIN) 875-125 MG per tablet TAKE 1 TABLET BY MOUTH EVERY TWELVE HOURS FOR 10 DAYS.  primidone (MYSOLINE) 50 MG tablet Take 1 tablet by mouth 3 times daily 90 tablet 3     No current facility-administered medications for this visit.       Social History:     Social History     Socioeconomic History    Marital status:      Spouse name: Not on file    Number of children: 3    Years of education: Not on file    Highest education level: Not on file   Occupational History    Occupation: Disability since 2011   Tobacco Use    Smoking status: Never Smoker    Smokeless tobacco: Never Used   Vaping Use    Vaping Use: Never used   Substance and Sexual Activity    Alcohol use: No     Alcohol/week: 0.0 standard drinks    Drug use: No    Sexual activity: Not on file   Other Topics Concern    Not on file   Social History Narrative    Not on file     Social Determinants of Health     Financial Resource Strain: Medium Risk    Difficulty of Paying Living Expenses: Somewhat hard   Food Insecurity: No Food Insecurity    Worried About Running Out of Food in the Last Year: Never true    Jeffry of Food in the Last Year: Never true   Transportation Needs:     Lack of Transportation (Medical): Not on file    Lack of Transportation (Non-Medical):  Not on file   Physical Activity:     Days of Exercise per Week: Not on file    Minutes of Exercise per Session: Not on file   Stress:     Feeling of Stress : Not on file   Social Connections:     Frequency of Communication with Friends and Family: Not on file    Frequency of Social Gatherings with Friends and Family: Not on file    Attends Hinduism Services: Not on file    Active Member of Clubs or Organizations: Not on file    Attends Club or Organization Meetings: Not on file    Marital Status: Not on file   Intimate Partner Violence:     Fear of Current or Ex-Partner: Not on file    Emotionally Abused: Not on file    Physically Abused: Not on file    Sexually Abused: Not on file   Housing Stability:     Unable to Pay for Housing in the Last Year: Not on file    Number of [place] in the Last Year: Not on file    Unstable Housing in the Last Year: Not on file     Family History:     Family History   Problem Relation Age of Onset    Heart Disease Father 61        MI    Stroke Brother     Obesity Brother     Arthritis Mother     Seizures Mother     Obesity Sister     Other Brother         pulmonary embolism    Diabetes Daughter     Seizures Brother       Allergies:   Ciprofloxacin-ciproflox hcl er, Heparin, Metformin, Niacin and related, Tramadol hcl, Ultram [tramadol], Warfarin, and Other     Review of Systems:   Review of Systems   Constitutional: Negative. Respiratory: Negative. Cardiovascular: Negative. Gastrointestinal: Negative. Genitourinary: Negative. Musculoskeletal: Negative. Skin: Positive for wound. Allergic/Immunologic: Negative. Neurological: Negative. Physical Examination :   /78   Pulse 79   Ht 6' 2\" (1.88 m)   Wt 268 lb (121.6 kg)   SpO2 99%   BMI 34.41 kg/m²     Physical Exam  Constitutional:       Appearance: He is well-developed. HENT:      Head: Normocephalic and atraumatic. Cardiovascular:      Rate and Rhythm: Normal rate. Heart sounds: Normal heart sounds. No friction rub. No gallop. Pulmonary:      Effort: Pulmonary effort is normal.      Breath sounds: Normal breath sounds. No wheezing. Abdominal:      General: Bowel sounds are normal.      Palpations: Abdomen is soft. There is no mass. Tenderness: There is no abdominal tenderness. Musculoskeletal:         General: Normal range of motion. Cervical back: Normal range of motion and neck supple. Lymphadenopathy:      Cervical: No cervical adenopathy. Skin:     General: Skin is warm and dry. Comments: He does have some petechial-looking lesions in the legs bilaterally. There is a right plantar foot ulceration in an area that is somewhat callused. Neurological:      Mental Status: He is alert and oriented to person, place, and time.          Medical Decision Making:   I haveindependently reviewed the following labs:  CBC with Differential:  Lab Results   Component Value Date    WBC 7.4 12/30/2021    WBC 7.1 12/01/2021    HGB 14.8 12/30/2021    HGB 14.6 12/01/2021    HCT 42.2 12/30/2021    HCT 39.2 12/01/2021     12/30/2021     12/01/2021    SEGSPCT 80.2 12/30/2021 SEGSPCT 55.4 11/12/2021    LYMPHOPCT 35 12/01/2021    LYMPHOPCT 37 08/05/2021    LYMPHOPCT 31.9 09/22/2016    LYMPHOPCT 16.8 11/12/2015    MONOPCT 7.6 12/30/2021    MONOPCT 8 12/01/2021    MONOPCT 6.0 09/22/2016    MONOPCT 4.5 11/12/2015    EOSPCT 6.2 09/22/2016    EOSPCT 1.1 11/12/2015     BMP:  Lab Results   Component Value Date     12/30/2021     12/17/2021    K 4.0 12/30/2021    K 4.0 12/17/2021    K 3.7 12/04/2021    K 4.1 11/12/2021     12/30/2021     12/17/2021    CO2 24 12/30/2021    CO2 23 12/17/2021    BUN 8 12/30/2021    BUN 11 12/17/2021    CREATININE 0.8 12/30/2021    CREATININE 0.9 12/17/2021    MG 1.7 11/13/2021    MG 1.9 07/29/2021     Hepatic Function Panel:   Lab Results   Component Value Date    PROT 6.4 12/01/2021    PROT 7.5 11/12/2021    LABALBU 4.1 12/01/2021    LABALBU 4.5 11/12/2021    LABALBU 4.3 03/23/2012    LABALBU 3.7 01/06/2012    BILIDIR 0.15 07/29/2021    BILIDIR 0.16 07/28/2021    IBILI 0.46 07/29/2021    IBILI 0.37 07/28/2021    BILITOT 0.58 12/01/2021    BILITOT 0.5 11/12/2021    ALKPHOS 89 12/01/2021    ALKPHOS 96 11/12/2021    ALT 24 12/01/2021    ALT 24 11/12/2021    AST 16 12/01/2021    AST 16 11/12/2021     Lab Results   Component Value Date    CRP 0.47 09/29/2021     Lab Results   Component Value Date    SEDRATE 4 07/31/2021     Thank you for allowing us to participate in the care of this patient. Pleasecall with questions. Debbi Beard MD  [employer] messaging: [phone]    This note is created with the assistance of a speech recognition program.  While intending to generate a document that actually reflects the content ofthe visit, the document can still have some errors including those of syntax and sound a like substitutions which may escape proof reading. It such instances, actual meaning can be extrapolated by contextual diversion.

## 2022-03-11 LAB
MRSA, DNA, NASAL: NEGATIVE
SPECIMEN DESCRIPTION: NORMAL

## 2022-03-14 ENCOUNTER — CARE COORDINATION (OUTPATIENT)
Dept: CARE COORDINATION | Age: 52
End: 2022-03-14

## 2022-03-14 NOTE — CARE COORDINATION
I left a message asking Hector to please call me back regarding the PAP paperwork I had sent him awhile ago. If I don't hear back from him within the next two weeks I will close the encounter and end the episode.        [facility]   Medication Assistance  [facility], and [employer]    (G) [phone]  (N) [phone]

## 2022-03-18 ENCOUNTER — TELEPHONE (OUTPATIENT)
Dept: INTERNAL MEDICINE CLINIC | Age: 52
End: 2022-03-18

## 2022-03-18 NOTE — TELEPHONE ENCOUNTER
Pt called in saying he has had swelling in his left lower leg x 2 days and also has some pain. He has a hx of clots and is on pradaxa. He says he has \"surface clots\" and they are popping. Pt not sure if d/t swelling or his blood thinner. Per vo from /I notified pt  recommends he goes to ER for eval.  Needs further work up and to be seen. She is not in today. Pt verbalizes understanding.

## 2022-03-19 PROBLEM — R07.9 CHEST PAIN: Status: RESOLVED | Noted: 2020-12-29 | Resolved: 2022-03-19

## 2022-03-19 PROBLEM — E11.621 ULCER OF FOOT DUE TO TYPE 2 DIABETES MELLITUS (HCC): Status: ACTIVE | Noted: 2022-03-19

## 2022-03-19 PROBLEM — L97.509 ULCER OF FOOT DUE TO TYPE 2 DIABETES MELLITUS (HCC): Status: ACTIVE | Noted: 2022-03-19

## 2022-03-19 PROBLEM — I82.451 ACUTE DEEP VEIN THROMBOSIS (DVT) OF RIGHT PERONEAL VEIN (HCC): Status: ACTIVE | Noted: 2022-03-19

## 2022-03-19 PROBLEM — L08.9 WOUND INFECTION: Status: RESOLVED | Noted: 2021-07-20 | Resolved: 2022-03-19

## 2022-03-19 PROBLEM — G57.50 TARSAL TUNNEL SYNDROME: Status: ACTIVE | Noted: 2022-03-19

## 2022-03-19 PROBLEM — R56.9 SEIZURES (HCC): Status: RESOLVED | Noted: 2021-07-26 | Resolved: 2022-03-19

## 2022-03-19 PROBLEM — T14.8XXA WOUND INFECTION: Status: RESOLVED | Noted: 2021-07-20 | Resolved: 2022-03-19

## 2022-03-19 PROBLEM — E66.9 OBESITY (BMI 30.0-34.9): Status: ACTIVE | Noted: 2017-01-21

## 2022-03-19 PROBLEM — E66.811 OBESITY (BMI 30.0-34.9): Status: ACTIVE | Noted: 2017-01-21

## 2022-03-19 PROBLEM — R11.2 NAUSEA AND VOMITING: Status: RESOLVED | Noted: 2018-10-16 | Resolved: 2022-03-19

## 2022-03-21 ENCOUNTER — HOSPITAL ENCOUNTER (OUTPATIENT)
Age: 52
Discharge: HOME | End: 2022-03-21
Payer: MEDICARE

## 2022-03-21 LAB
ANION GAP SERPL CALCULATED.3IONS-SCNC: 12 MEQ/L (ref 8–16)
BASOPHILS # BLD: 0.2 %
BASOPHILS ABSOLUTE: 0 THOU/MM3 (ref 0–0.1)
BUN BLDV-MCNC: 9 MG/DL (ref 7–22)
C-REACTIVE PROTEIN: 0.66 MG/DL (ref 0–1)
CALCIUM SERPL-MCNC: 9 MG/DL (ref 8.5–10.5)
CHLORIDE BLD-SCNC: 105 MEQ/L (ref 98–111)
CO2: 23 MEQ/L (ref 23–33)
CREAT SERPL-MCNC: 0.7 MG/DL (ref 0.4–1.2)
EOSINOPHIL # BLD: 9.6 %
EOSINOPHILS ABSOLUTE: 0.9 THOU/MM3 (ref 0–0.4)
ERYTHROCYTE [DISTWIDTH] IN BLOOD BY AUTOMATED COUNT: 13 % (ref 11.5–14.5)
ERYTHROCYTE [DISTWIDTH] IN BLOOD BY AUTOMATED COUNT: 40.5 FL (ref 35–45)
GFR SERPL CREATININE-BSD FRML MDRD: > 90 ML/MIN/1.73M2
GLUCOSE BLD-MCNC: 195 MG/DL (ref 70–108)
HCT VFR BLD CALC: 41.9 % (ref 42–52)
HEMOGLOBIN: 14.8 GM/DL (ref 14–18)
IMMATURE GRANS (ABS): 0.11 THOU/MM3 (ref 0–0.07)
IMMATURE GRANULOCYTES: 1.2 %
LACTIC ACID, SEPSIS: 1.4 MMOL/L (ref 0.5–1.9)
LYMPHOCYTES # BLD: 23.6 %
LYMPHOCYTES ABSOLUTE: 2.2 THOU/MM3 (ref 1–4.8)
MCH RBC QN AUTO: 30.8 PG (ref 26–33)
MCHC RBC AUTO-ENTMCNC: 35.3 GM/DL (ref 32.2–35.5)
MCV RBC AUTO: 87.1 FL (ref 80–94)
MONOCYTES # BLD: 6.6 %
MONOCYTES ABSOLUTE: 0.6 THOU/MM3 (ref 0.4–1.3)
NUCLEATED RED BLOOD CELLS: 0 /100 WBC
PLATELET # BLD: 224 THOU/MM3 (ref 130–400)
PMV BLD AUTO: 9 FL (ref 9.4–12.4)
POTASSIUM SERPL-SCNC: 4 MEQ/L (ref 3.5–5.2)
RBC # BLD: 4.81 MILL/MM3 (ref 4.7–6.1)
SEG NEUTROPHILS: 58.8 %
SEGMENTED NEUTROPHILS ABSOLUTE COUNT: 5.6 THOU/MM3 (ref 1.8–7.7)
SODIUM BLD-SCNC: 140 MEQ/L (ref 135–145)
WBC # BLD: 9.5 THOU/MM3 (ref 4.8–10.8)

## 2022-03-21 PROCEDURE — 36415 COLL VENOUS BLD VENIPUNCTURE: CPT

## 2022-03-21 PROCEDURE — 85025 COMPLETE CBC W/AUTO DIFF WBC: CPT

## 2022-03-21 PROCEDURE — 80048 BASIC METABOLIC PNL TOTAL CA: CPT

## 2022-03-21 PROCEDURE — 83605 ASSAY OF LACTIC ACID: CPT

## 2022-03-21 PROCEDURE — 86140 C-REACTIVE PROTEIN: CPT

## 2022-03-23 ENCOUNTER — HOSPITAL ENCOUNTER (OUTPATIENT)
Dept: MRI IMAGING | Age: 52
Discharge: HOME | End: 2022-03-23
Payer: MEDICARE

## 2022-03-23 DIAGNOSIS — L97.516 DIABETIC ULCER OF TOE OF RIGHT FOOT ASSOCIATED WITH DIABETES MELLITUS DUE TO UNDERLYING CONDITION, WITH BONE INVOLVEMENT WITHOUT EVIDENCE OF NECROSIS (HCC): ICD-10-CM

## 2022-03-23 DIAGNOSIS — E08.621 DIABETIC ULCER OF TOE OF RIGHT FOOT ASSOCIATED WITH DIABETES MELLITUS DUE TO UNDERLYING CONDITION, WITH BONE INVOLVEMENT WITHOUT EVIDENCE OF NECROSIS (HCC): ICD-10-CM

## 2022-03-23 PROCEDURE — 73718 MRI LOWER EXTREMITY W/O DYE: CPT

## 2022-03-28 ENCOUNTER — CARE COORDINATION (OUTPATIENT)
Dept: CARE COORDINATION | Age: 52
End: 2022-03-28

## 2022-04-06 ENCOUNTER — CARE COORDINATION (OUTPATIENT)
Dept: CARE COORDINATION | Age: 52
End: 2022-04-06

## 2022-04-07 ENCOUNTER — TELEPHONE (OUTPATIENT)
Dept: NEUROLOGY | Age: 52
End: 2022-04-07

## 2022-04-07 ENCOUNTER — OFFICE VISIT (OUTPATIENT)
Dept: NEUROLOGY | Age: 52
End: 2022-04-07
Payer: MEDICARE

## 2022-04-07 VITALS
DIASTOLIC BLOOD PRESSURE: 80 MMHG | HEIGHT: 74 IN | WEIGHT: 275 LBS | SYSTOLIC BLOOD PRESSURE: 129 MMHG | HEART RATE: 81 BPM | BODY MASS INDEX: 35.29 KG/M2

## 2022-04-07 DIAGNOSIS — G40.909 SEIZURE DISORDER (HCC): Primary | ICD-10-CM

## 2022-04-07 DIAGNOSIS — E66.01 SEVERE OBESITY (BMI 35.0-39.9) WITH COMORBIDITY (HCC): ICD-10-CM

## 2022-04-07 PROCEDURE — 99215 OFFICE O/P EST HI 40 MIN: CPT | Performed by: PSYCHIATRY & NEUROLOGY

## 2022-04-07 PROCEDURE — G8417 CALC BMI ABV UP PARAM F/U: HCPCS | Performed by: PSYCHIATRY & NEUROLOGY

## 2022-04-07 PROCEDURE — 3017F COLORECTAL CA SCREEN DOC REV: CPT | Performed by: PSYCHIATRY & NEUROLOGY

## 2022-04-07 PROCEDURE — 1036F TOBACCO NON-USER: CPT | Performed by: PSYCHIATRY & NEUROLOGY

## 2022-04-07 PROCEDURE — G8427 DOCREV CUR MEDS BY ELIG CLIN: HCPCS | Performed by: PSYCHIATRY & NEUROLOGY

## 2022-04-07 RX ORDER — LACOSAMIDE 200 MG/1
200 TABLET ORAL 2 TIMES DAILY
Qty: 180 TABLET | Refills: 2 | Status: SHIPPED | OUTPATIENT
Start: 2022-04-07 | End: 2022-09-19

## 2022-04-07 RX ORDER — LORAZEPAM 0.5 MG/1
TABLET ORAL
Qty: 20 TABLET | Refills: 0 | Status: SHIPPED | OUTPATIENT
Start: 2022-04-07 | End: 2022-08-07

## 2022-04-07 NOTE — PROGRESS NOTES
[facility], [address], [address] [place] [address]  Ph: [phone] or [phone]  FAX: [phone]    Chief Complaint: seizures     Dear Braulio Franco MD     I had the pleasure of seeing your patient today in neurology consultation for his symptoms. As you would recall Hallie Carter is a 46 y.o. male. Patient presented to [facility] with my 4/7/22. He was accompanied by his wife. Patient had an arachnoid cyst and has history of right-sided neurosurgery. Patient also has history of seizures, RUE tremor, and recently has a possible focal seizure, for which an EEG was ordered. He recently discontinued Depakote. He had been on Depakote for 5-6 years. Patient states his last seizure was 5 days ago. Before that, he was seizure-free for 2 weeks. Patient's seizures vary. He reports that he does not remember most seizures. Patient admits to aura. Patient explains that his head goes numb and tingles on the left side. Sometimes he experiences eye drooping. He also loses coordination and cannot walk. He states that he falls down during seizures sometimes. Patient typically lays down when he feels an aura, and becomes confused and has hallucinations. His wife reports that the altered mental status will last for up to 6 hours. The patient reports that he also has shorter seizures that last approximately 10 minutes. Patient wife reports he has only had 2 grand mal seizures. His first grand mal seizure was after his brain surgery, and the second one occurred approximately 10 years ago. The patient's wife also reports that he sometimes had seizures wherein his eyes would roll and he would vomit. Patient admitted to incontinence during a few of these episodes as well. Among seizures, patient also complains of episodic tremors. Since discontinuing Depakote, patient's tremors have decreased in frequency. Patient had encephalopathy in July 2021.  Patient's wife states he is still recovering, as he is not as functional as he used to be. Patient has stopped driving since. Type A seizures: numbness/tingling, duration, lack of awareness  Type B: grand mal    EEG 7/28/21: Severely abnormal record with evidence of bihemispheric dysfunction no active seizures recorded but clear-cut ictal propensity recording consistent with resolved status head dominant focus appears to be residual left frontal area  EEG 12/2/21: During this day of recording no events were recorded. The interictal EEG was abnormal due to breach rhythm over the right frontal region suggesting underlying skull defect. Occasional polymorphic theta slowing over the left frontal region suggested underlying neuronal dysfunction. Occasional left posterior temporal sharp waves conferred an increased risk for focal onset seizures. Monitoring was continued in order to record the patient's typical events. The EKG channel revealed no abnormalities. CT Head WO Contrast 11/12/21: unremarkable      Current prophylactic medication Dosage   Mysoline 50 mg   Keppra 1000 mg   Vimpat 100 mg   Zonegran 100 mg   Ativan      Previous prophylactic medications Reason for discontinuation   Depakote                            Past Medical History:   Diagnosis Date    Abnormal thyroid biopsy     s/p left hemithyroidectomy 6/3872 - follicular adenoma    Acid reflux     Anemia     resolved - previously seen by Dr. Ketan Art (due to enlarged spleen)    Anesthesia     seizures with brain surgery due to blood sugar got really high    Bile reflux gastritis     per EGD 11/16/21 - Dr. Gabriele Self - to start Carafate.     Bipolar disorder ([address]     Blood clot in vein 04/07/2016    [facility]) 04/2019    thyroid    Chest pain     previously seeing [facility] cardiologist, now seeing Dr. Martina Wheat (LAD bridging on cath 4/2016)    Chronic back pain     Chronic bronchitis ([address]     Dr. Troy Bermeo Colon polyp 08/30/2016    Depression     seeing [facility] DVT (deep venous thrombosis) ([address] [id number]    not on Coumadin due to unable to regulate - Dr. Ashok Farley - no etiology found per patient    Esophageal abnormality     nodule     Fatty liver disease, nonalcoholic     U/S 48/9054 [facility]    Furuncle     legs    History of Doppler ultrasound 05/29/2011    No hemodynamically significant carotid stenosis is identified. A thyroid nodule on each side. Dedicated ultrasound of thyroid gland is suggested to further evaluate if clinically indicated.  History of pulmonary embolism 2007    s/p GFF, related to knee surgery    Hx of blood clots     leg and lung    Hyperlipidemia     severely elevated triglycerides    Hypertension     diastolic    Intracranial arachnoid cyst 11/2012    Dr. Sylvia Estrada drained, complicated with seizures, DKA    Irritable bowel syndrome     Liver disease     Lucianarobert David - elevated LFT - positive smooth muscle antibody, steatosis per liver bx 3/2014 with Dr. Chirag Felix (multiple drug resistant organisms) resistance 2012    MRSA (methicillin resistant staph aureus) culture positive     h/o in foot and before brain surgery    Nephrolithiasis     noted on CT abdomen 9/2016, 6/2019    Neuropathy     Pancreatic insufficiency     Positive SANDY (antinuclear antibody)     Dr. Eloy Ewing - first visit in 6/2013    Prolonged emergence from general anesthesia     Restless legs syndrome     Seizures ([address]     started with brain surgery    Sinus tachycardia     Holter 3/2013 - seeing Dr. Jami Zheng fracture [facility])     clips     Sleep apnea     positive sleep apnea per study 10/2020.     Systolic dysfunction     \"systolic bridge\"  EF 81% ECHO 9/2019    Type II or unspecified type diabetes mellitus without mention of complication, not stated as uncontrolled 2007     Past Surgical History:   Procedure Laterality Date    CARDIAC CATHETERIZATION      2011,5-6 years ago   [address]  3-2012   [address] 04/28/2016    [facility]     CARPAL TUNNEL RELEASE  01/2017    CHOLECYSTECTOMY  2004    COLONOSCOPY  2012    COLONOSCOPY  08/2016    2 tubular adenomas - reportedly needs repeat scope in 6 months    CRANIOTOMY  11/2012    arachnoid cyst drainage    EKG 12-LEAD  10/18/2015         ENDOSCOPY, COLON, DIAGNOSTIC      HERNIA REPAIR Right 1996    [facility]--Dr. Zoey Coello INGUINAL HERNIA REPAIR Right 09/15/2016    Robotic assisted    KNEE ARTHROSCOPY Right 2016    KNEE SURGERY Left 2007    acl and debrided twice    OTHER SURGICAL HISTORY  5-31-11    Tilt table was associated w/ nonspecific symptoms or nausea, otherwise no significant dizziness or syncope. No significant hemodynamic changes. Otherwise, unremarkable tilt table test after 30 minutes of tilting.  OTHER SURGICAL HISTORY  01/20/2017    RIGHT SHOULDER ARTHROSCOPY, OPEN STAN, OPEN ACROMIOPLASTY, BICEP TENDESIS, RIGHT CARPAL TUNNEL RELEASE    SHOULDER SURGERY Right 01/2007    THYROID LOBECTOMY N/A 1/15/2021    THYROID LOBECTOMY, UVULOPALATOPHARYNGOPLAST LAOP performed by Severo Rolle MD at [facility] ECHOCARDIOGRAM  3-05-11    LV size and systolic function normal. EF 55-65%.      UPPER GASTROINTESTINAL ENDOSCOPY  2012    UPPER GASTROINTESTINAL ENDOSCOPY  2016    Dr. Vijay Garcia Left 10/22/2019    EGD DILATION SAVORY performed by Uri Young MD at [facility] ENDOSCOPY Left 10/22/2019    EGD BIOPSY performed by Uri Young MD at [facility] N/A 11/16/2021    EGD performed by Uri Young MD at [facility] ENDOSCOPY Left 11/16/2021    EGD BIOPSY performed by Uri Young MD at [address]  2007     Allergies   Allergen Reactions    Ciprofloxacin-Ciproflox Hcl Er Other (See Comments)     Elevated creatinine    Heparin      Monitor for thrombocytopenia    Metformin Nausea Only     Abdominal pain, diarrhea    Niacin And Related Other (See Comments)     Burning sensation, severe flushing    Tramadol Hcl      Contraindication to seizure medications    Ultram [Tramadol]      Contraindication to seizure medications    Warfarin      Very difficult to regulate in past    Other      Other reaction(s): very difficult to regulate     Family History   Problem Relation Age of Onset    Heart Disease Father 61        MI    Stroke Brother     Obesity Brother     Arthritis Mother     Seizures Mother     Obesity Sister     Other Brother         pulmonary embolism    Diabetes Daughter     Seizures Brother       Social History     Socioeconomic History    Marital status:      Spouse name: Not on file    Number of children: 3    Years of education: Not on file    Highest education level: Not on file   Occupational History    Occupation: Disability since 2011   Tobacco Use    Smoking status: Never Smoker    Smokeless tobacco: Never Used   Vaping Use    Vaping Use: Never used   Substance and Sexual Activity    Alcohol use: No     Alcohol/week: 0.0 standard drinks    Drug use: No    Sexual activity: Not on file   Other Topics Concern    Not on file   Social History Narrative    Not on file     Social Determinants of Health     Financial Resource Strain: Medium Risk    Difficulty of Paying Living Expenses: Somewhat hard   Food Insecurity: No Food Insecurity    Worried About Running Out of Food in the Last Year: Never true    Jeffry of Food in the Last Year: Never true   Transportation Needs:     Lack of Transportation (Medical): Not on file    Lack of Transportation (Non-Medical):  Not on file   Physical Activity:     Days of Exercise per Week: Not on file    Minutes of Exercise per Session: Not on file   Stress:     Feeling of Stress : Not on file   Social Connections:     Frequency of Communication with Friends and Family: Not on file    Frequency of Social Gatherings with Friends and Family: Not on file    Attends Druze Services: Not on file    Active Member of Clubs or Organizations: Not on file    Attends Club or Organization Meetings: Not on file    Marital Status: Not on file   Intimate Partner Violence:     Fear of Current or Ex-Partner: Not on file    Emotionally Abused: Not on file    Physically Abused: Not on file    Sexually Abused: Not on file   Housing Stability:     Unable to Pay for Housing in the Last Year: Not on file    Number of [place] in the Last Year: Not on file    Unstable Housing in the Last Year: Not on file      /80 (Site: Left Upper Arm, Position: Sitting, Cuff Size: Medium Adult)   Pulse 81   Ht 6' 2\" (1.88 m)   Wt 275 lb (124.7 kg)   BMI 35.31 kg/m²      HEENT [] Hearing Loss  [] Visual Disturbance  [] Tinnitus  [] Eye pain   Respiratory [] Shortness of Breath  [] Cough  [] Snoring   Cardiovascular [] Chest Pain  [] Palpitations  [] Lightheaded   GI [] Constipation  [] Diarrhea  [] Swallowing change  [] Nausea/vomiting    [] Urinary Frequency  [] Urinary Urgency   Musculoskeletal [] Neck pain  [] Back pain  [] Muscle pain  [] Restless legs   Dermatologic [] Skin changes   Neurologic [] Memory loss/confusion  [x] Seizures  [] Trouble walking or imbalance  [] Dizziness  [] Sleep disturbance  [] Weakness  [] Numbness  [x] Tremors  [] Speech Difficulty  [] Headaches  [] Light Sensitivity  [] Sound Sensitivity   Endocrinology []Excessive thirst  []Excessive hunger   Psychiatric [] Anxiety/Depression  [] Hallucination   Allergy/immunology []Hives/environmental allergies   Hematologic/lymph [] Abnormal bleeding  [] Abnormal bruising     General examination:    Head: Normocephalic, atraumatic  Eyes: Extraocular movements intact  Lungs: Respirations unlabored, chest wall no deformity  ENT: Normal external ear canals, no sinus tenderness  Heart: Regular rate rhythm  Abdomen: No masses, tenderness  Extremities: No cyanosis or edema, 2+ pulses  Skin: Intact, normal skin color    Neurological examination:  Mental status   Alert and oriented; intact memory with no confusion, speech or language problems; no hallucinations or delusions     Cranial nerves   II - visual fields intact to confrontation                                                III, IV, VI - extra-ocular muscles full: no pupillary defect; no KARIS, no nystagmus, no ptosis   V - normal facial sensation                                                               VII - normal facial symmetry                                                             VIII - intact hearing                                                                             IX, X - symmetrical palate                                                                  XI - symmetrical shoulder shrug                                                       XII - midline tongue without atrophy or fasciculation     Motor function  Normal muscle bulk and tone; normal power 5/5, including fine motor movements     Sensory function Intact to touch, pin, vibration, proprioception     Cerebellar Intact fine motor movement. No involuntary movements or tremors     Reflex function Intact 2+ DTR and symmetric. Negative Babinski     Gait                  Normal station and gait       Assessment Recommendations:    Seizure disorder  The patient has a history of right frontal arachnoid cyst status postresection. Patient previously has undergone long-term EEG monitoring at [facility] in April 2021 which showed right frontal, bifrontal and left posterior temporal sharp waves. However patient had nonepileptic events during the recording. For now, he is on Neurontin 600 mg 4 times a day, zonisamide 600 mg p.o. nightly, Vimpat 200 mg twice a day, Keppra 2 g twice daily.       Intermittent RUE tremor, distractible  The patient has intermittent tremor in the right upper extremity. This tremor appears to be distractible and there appears to be some functional overlay of the symptoms. Down the road, a repeat video EEG may be obtained and patient's AED need to be stratified if most of his spells are non epileptic     Patient to follow up in 6-8 weeks or sooner if symptoms worsen. Scribe Attestation:   By signing my name below, Lisa Karimi, attest that this documentation has been prepared under the direction and in the presence of Isabel Moss MD.    Electronically Signed: Lawanda Davidson. 04/07/22. 10:35 AM    I, Nicki Sy MD, personally performed the services described in this documentation. All medical record entries made by the scribe were at my direction and in my presence. I have reviewed the chart and discharge instructions (if applicable) and agree that the record reflects my personal performance and is accurate and complete.     Electronically Signed: Nicki Sy 4/14/2022 8:34 AM    Diplomate, American Board of Psychiatry and Neurology  Diplomate, American Board of Clinical Neurophysiology  Diplomate, American Board of Epilepsy

## 2022-04-07 NOTE — TELEPHONE ENCOUNTER
Clarification given to [facility] for Ativan sig line, \"take one tablet daily by mouth as needed\"

## 2022-04-25 ENCOUNTER — TELEPHONE (OUTPATIENT)
Dept: NEUROLOGY | Age: 52
End: 2022-04-25

## 2022-04-25 NOTE — TELEPHONE ENCOUNTER
Patient called stating that Vimpat assistance program did not receive application and Rx. He said that he has one weeks worth of medication. Application and Rx reprinted and refaxed.

## 2022-04-26 ENCOUNTER — OFFICE VISIT (OUTPATIENT)
Dept: INTERNAL MEDICINE CLINIC | Age: 52
End: 2022-04-26
Payer: MEDICARE

## 2022-04-26 VITALS
DIASTOLIC BLOOD PRESSURE: 88 MMHG | WEIGHT: 292.2 LBS | HEART RATE: 92 BPM | HEIGHT: 74 IN | TEMPERATURE: 98.1 F | SYSTOLIC BLOOD PRESSURE: 128 MMHG | BODY MASS INDEX: 37.5 KG/M2

## 2022-04-26 DIAGNOSIS — R00.0 SINUS TACHYCARDIA: ICD-10-CM

## 2022-04-26 DIAGNOSIS — I82.551 CHRONIC DEEP VEIN THROMBOSIS (DVT) OF RIGHT PERONEAL VEIN (HCC): ICD-10-CM

## 2022-04-26 DIAGNOSIS — R11.2 NON-INTRACTABLE VOMITING WITH NAUSEA, UNSPECIFIED VOMITING TYPE: ICD-10-CM

## 2022-04-26 DIAGNOSIS — M79.89 LEG SWELLING: ICD-10-CM

## 2022-04-26 DIAGNOSIS — E11.65 TYPE 2 DIABETES MELLITUS WITH HYPERGLYCEMIA, WITH LONG-TERM CURRENT USE OF INSULIN (HCC): Primary | ICD-10-CM

## 2022-04-26 DIAGNOSIS — Z79.4 TYPE 2 DIABETES MELLITUS WITH HYPERGLYCEMIA, WITH LONG-TERM CURRENT USE OF INSULIN (HCC): Primary | ICD-10-CM

## 2022-04-26 DIAGNOSIS — Z79.4 TYPE 2 DIABETES MELLITUS WITH DIABETIC POLYNEUROPATHY, WITH LONG-TERM CURRENT USE OF INSULIN (HCC): ICD-10-CM

## 2022-04-26 DIAGNOSIS — E11.42 TYPE 2 DIABETES MELLITUS WITH DIABETIC POLYNEUROPATHY, WITH LONG-TERM CURRENT USE OF INSULIN (HCC): ICD-10-CM

## 2022-04-26 DIAGNOSIS — E55.9 VITAMIN D DEFICIENCY: ICD-10-CM

## 2022-04-26 PROCEDURE — 3017F COLORECTAL CA SCREEN DOC REV: CPT | Performed by: INTERNAL MEDICINE

## 2022-04-26 PROCEDURE — 3051F HG A1C>EQUAL 7.0%<8.0%: CPT | Performed by: INTERNAL MEDICINE

## 2022-04-26 PROCEDURE — G8417 CALC BMI ABV UP PARAM F/U: HCPCS | Performed by: INTERNAL MEDICINE

## 2022-04-26 PROCEDURE — 2022F DILAT RTA XM EVC RTNOPTHY: CPT | Performed by: INTERNAL MEDICINE

## 2022-04-26 PROCEDURE — 1036F TOBACCO NON-USER: CPT | Performed by: INTERNAL MEDICINE

## 2022-04-26 PROCEDURE — G8427 DOCREV CUR MEDS BY ELIG CLIN: HCPCS | Performed by: INTERNAL MEDICINE

## 2022-04-26 PROCEDURE — 99214 OFFICE O/P EST MOD 30 MIN: CPT | Performed by: INTERNAL MEDICINE

## 2022-04-26 RX ORDER — GABAPENTIN 600 MG/1
600 TABLET ORAL 4 TIMES DAILY
COMMUNITY
End: 2022-06-20 | Stop reason: SDUPTHER

## 2022-04-26 RX ORDER — PROMETHAZINE HYDROCHLORIDE 12.5 MG/1
12.5-25 TABLET ORAL EVERY 8 HOURS PRN
Qty: 60 TABLET | Refills: 1 | Status: SHIPPED | OUTPATIENT
Start: 2022-04-26 | End: 2022-06-20 | Stop reason: SDUPTHER

## 2022-04-26 RX ORDER — ERGOCALCIFEROL 1.25 MG/1
50000 CAPSULE ORAL WEEKLY
Qty: 12 CAPSULE | Refills: 1 | Status: SHIPPED | OUTPATIENT
Start: 2022-04-26

## 2022-04-26 RX ORDER — HYDROCODONE BITARTRATE AND ACETAMINOPHEN 5; 325 MG/1; MG/1
TABLET ORAL
COMMUNITY
Start: 2022-04-22 | End: 2022-06-07

## 2022-04-26 NOTE — TELEPHONE ENCOUNTER
Received call from [facility] Patient Assistance Program ([phone]) for patient's Vimpat. They stated that they still have not received script. Updated fax numbers were given: [phone] and [phone]. Prescription reprinted and faxed to updated fax number.

## 2022-04-26 NOTE — PROGRESS NOTES
1970    Chief Complaint   Patient presents with    Diabetes     2 months / last AC 7.7 on 2/21/22    Other     DVT , MRSA cellulitis        Pt is a 46 y.o. male who presents for a 2 month follow up visit. HPI  Leg edema bilaterally, better in left - suggested using CHERY hose as smaller. Using ace wrap on right. Continued to encourage elevating legs above level of heart when sitting. Right LE DVT (peroneal vein right calf) dx 12/29/21, repeat doppler 3/9/22 still present - give Pradaxa samples still working with Lakisha Sheppardbrandyn. They need to call her as she may have closed encounter as she didn't get paperwork but Marko Sow says he sent it. He has had provoked DVT with PE in past - will keep on Pradaxa for now - due to limited mobility and DVT still present on last venous doppler. Infected right foot ulcer - back on Augmentin - culture positive - MRI this week and f/u with Dr. Gisela Hameed next week. To have surgery to remove bone in future. He did see ID in [place] - Dr. Airam Riley 3/10/22 - told to do Hibiclens bath one week a month to decrease MRSA load. Repeated nasal MRSA swab - negative 3/10/22. Seizure disorder - history of right frontal arachnoid cyst status postresection. Long-term EEG monitoring at [facility] in April 2021 which showed right frontal, bifrontal and left posterior temporal sharp waves. However patient had nonepileptic events during the recording. For now, he is on Neurontin 600 mg 4 times a day, zonisamide 600 mg p.o. nightly, Vimpat 200 mg twice a day, Keppra 2 g twice daily. New neurologist Dr. Kristen Robins last visit 4/7/22 - took off Primidone and ween down Zonesimide. Fewer seizures. Less tremor. Intermittent RUE tremor, distractible and has some functional overlay of symptoms - off Primidone. Less tremor. Following with Dr Gina Robins. DM- HgA1c 7.7 2/21/22 - too soon to check again. FSBS high but increased pain and infection contributing, diet unchanged.   He is on Norco at night to help with pain from foot. His FSBS are 110-400's on download of glucometer today - majority high, [address]. He is on Basaglar 60 BID, 25 Units every meal plus SSI. Advised to increase Basaglar to 65 Units at night and eat 3 snacks/small meals a day - try Glucerna high protein drink even if not hungry. Consider CGM - recommended 4x daily monitoring especially with uncontrolled FSBS. Most recent eye exam: 6/2/20 - Dr. Elmer Mccallum.  Minimal/mild DM retinopathy. Will get eye exam when other medical issues more stable. Renal function - normal - eGFR >90 3/21/22. Microalbumin/creatinine ratio: 59 on 12/29/2020. Order to have this repeated given at last visit but not done yet. Positive new foot lesions as above, positive peripheral neuropathy - severe. LDL 70 4/2021 at goal - not on statin. Autonomic dysfunction -as above. He typically eats twice a day - suggested eating 3 x a day and use insulin for all meals. Checking FSBS 2-4x daily. GI follow up next week - Julian. Typically taking Phenergan 3 x a week for nausea. Not eating well because stomach hurts. Again suggested 3 small meal/snack a day to help DM - currently typically eats one big meal.  Suggested Glucerna high protein drink for breakfast/lunch as limited amount, quick and easy. Needs protein to heal with infection/ulcers/possible upcoming surgery. GI thought may have gastroporesis. Past Medical History:   Diagnosis Date    Abnormal thyroid biopsy     s/p left hemithyroidectomy 9/4163 - follicular adenoma    Acid reflux     Anemia     resolved - previously seen by Dr. Mak Delacruz (due to enlarged spleen)    Anesthesia     seizures with brain surgery due to blood sugar got really high    Bile reflux gastritis     per EGD 11/16/21 - Dr. Mariane Mohs - to start Carafate.     Bipolar disorder ([address]     [facility] in [facility]     Cancer [facility]) 04/2019    thyroid    Chest pain     previously seeing [facility] cardiologist, now seeing Dr. Louie Lemus (LAD bridging on cath 4/2016)    Chronic back pain     Chronic bronchitis ([facility]     Dr. Ryan Conte Colon polyp 08/30/2016    Depression     seeing Eleanor Puente DVT (deep venous thrombosis) ([facility] [id number]    not on Coumadin due to unable to regulate - Dr. Ashok Farley - no etiology found per patient    Esophageal abnormality     nodule     Fatty liver disease, nonalcoholic     U/S 94/[facility]    Furuncle     legs    History of Doppler ultrasound 05/29/2011    No hemodynamically significant carotid stenosis is identified. A thyroid nodule on each side. Dedicated ultrasound of thyroid gland is suggested to further evaluate if clinically indicated.  History of pulmonary embolism 2007    s/p GFF, related to knee surgery    Hx of blood clots     leg and lung    Hyperlipidemia     severely elevated triglycerides    Hypertension     diastolic    Intracranial arachnoid cyst 11/2012    Dr. Sylvia Estrada drained, complicated with seizures, DKA    Irritable bowel syndrome     Liver disease     Andrew Hernandez - elevated LFT - positive smooth muscle antibody, steatosis per liver bx 3/2014 with Dr. Chirag Felix (multiple drug resistant organisms) resistance 2012    MRSA (methicillin resistant staph aureus) culture positive     h/o in foot and before brain surgery    Nephrolithiasis     noted on CT abdomen 9/2016, 6/2019    Neuropathy     Pancreatic insufficiency     Positive SANDY (antinuclear antibody)     Dr. Eloy Ewing - first visit in 6/2013    Prolonged emergence from general anesthesia     Restless legs syndrome     Seizures ([facility]     started with brain surgery    Sinus tachycardia     Holter 3/2013 - seeing Dr. Jami Zheng fracture [facility])     clips     Sleep apnea     positive sleep apnea per study 10/2020.     Systolic dysfunction     \"systolic bridge\"  EF 97% ECHO 9/2019    Type II or unspecified type diabetes mellitus without mention of complication, not stated as uncontrolled 2007       Past Surgical History:   Procedure Laterality Date    CARDIAC CATHETERIZATION      2011,5-6 years ago   [address]  3-2012    CARDIAC CATHETERIZATION  04/28/2016    [facility]     CARPAL TUNNEL RELEASE  01/2017    CHOLECYSTECTOMY  2004    COLONOSCOPY  2012    COLONOSCOPY  08/2016    2 tubular adenomas - reportedly needs repeat scope in 6 months    CRANIOTOMY  11/2012    arachnoid cyst drainage    EKG 12-LEAD  10/18/2015         ENDOSCOPY, COLON, DIAGNOSTIC      HERNIA REPAIR Right 1996    [facility]--Dr. Sherley Gooden INGUINAL HERNIA REPAIR Right 09/15/2016    Robotic assisted    KNEE ARTHROSCOPY Right 2016    KNEE SURGERY Left 2007    acl and debrided twice    OTHER SURGICAL HISTORY  5-31-11    Tilt table was associated w/ nonspecific symptoms or nausea, otherwise no significant dizziness or syncope. No significant hemodynamic changes. Otherwise, unremarkable tilt table test after 30 minutes of tilting.  OTHER SURGICAL HISTORY  01/20/2017    RIGHT SHOULDER ARTHROSCOPY, OPEN STAN, OPEN ACROMIOPLASTY, BICEP TENDESIS, RIGHT CARPAL TUNNEL RELEASE    SHOULDER SURGERY Right 01/2007    THYROID LOBECTOMY N/A 1/15/2021    THYROID LOBECTOMY, UVULOPALATOPHARYNGOPLAST LAOP performed by Kaylah Rowe MD at [address] [place] [place] [address] ECHOCARDIOGRAM  3-05-11    LV size and systolic function normal. EF 55-65%.      UPPER GASTROINTESTINAL ENDOSCOPY  2012    UPPER GASTROINTESTINAL ENDOSCOPY  2016    Dr. Fernando Nava Left 10/22/2019    EGD DILATION SAVORY performed by Maryam Gonzales [facility] at [facility] ENDOSCOPY Left 10/22/2019    EGD BIOPSY performed by Maryam Gonzales MD at [address] [place] [address] ENDOSCOPY N/A 11/16/2021    EGD performed by Maryam Gonzales MD at [address] [place] [address] ENDOSCOPY Left 11/16/2021    EGD BIOPSY performed by Maryam Gonzales MD [facility]    VENA CAVA FILTER PLACEMENT  2007       Current Outpatient Medications   Medication Sig Dispense Refill    HYDROcodone-acetaminophen (NORCO) 5-325 MG per tablet TAKE 1 TABLET BY MOUTH TWO TIMES A DAY AS NEEDED FOR 7 DAYS      gabapentin (NEURONTIN) 600 MG tablet Take 600 mg by mouth 4 times daily.  vitamin D (ERGOCALCIFEROL) 1.25 MG (38748 UT) CAPS capsule Take 1 capsule by mouth once a week 12 capsule 1    promethazine (PHENERGAN) 12.5 MG tablet Take 1-2 tablets by mouth every 8 hours as needed for Nausea 60 tablet 1    metoprolol tartrate (LOPRESSOR) 25 MG tablet Take 1 tablet by mouth 2 times daily 180 tablet 1    lacosamide (VIMPAT) 200 MG tablet Take 1 tablet by mouth 2 times daily for 90 days.  180 tablet 2    LORazepam (ATIVAN) 0.5 MG tablet Lorazepam Active 0.5 MG PO as needed 20 tablet 0    blood glucose test strips (ONETOUCH ULTRA) strip 1 each by In Vitro route 5 times daily DX: E11.65 Dispense Onetouch Ultra 2 test strips 450 each 3    dabigatran (PRADAXA) 150 MG capsule Take 1 capsule by mouth 2 times daily 60 capsule 0    sucralfate (CARAFATE) 1 GM tablet Take 1 g by mouth 4 times daily      rOPINIRole (REQUIP) 1 MG tablet TAKE 1 TABLET BY MOUTH THREE TIMES A  tablet 1    ARIPiprazole (ABILIFY) 5 MG tablet Take 10 mg by mouth daily       vitamin B-12 (CYANOCOBALAMIN) 1000 MCG tablet Take 1,000 mcg by mouth 2 times daily       insulin glargine (BASAGLAR KWIKPEN) 100 UNIT/ML injection pen Inject 60 Units into the skin 2 times daily       DULoxetine (CYMBALTA) 60 MG extended release capsule Take 120 mg by mouth daily      levETIRAcetam (KEPPRA) 1000 MG tablet Take 2 tablets by mouth 2 times daily 60 tablet 3    insulin lispro (HUMALOG) 100 UNIT/ML injection vial Takes 25 units with meals plus group 2 sliding scale      pantoprazole (PROTONIX) 40 MG tablet Take 1 tablet by mouth 2 times daily 180 tablet 1    sildenafil (VIAGRA) 100 MG tablet Take 1 tablet by mouth as needed for Erectile Dysfunction 45 tablet 3    Insulin Pen Needle 32G X 4 MM MISC Use 5 times daily with insulin pens 450 each 3    terbinafine (GNP TERBINAFINE HYDROCHLORIDE) 1 % cream Apply topically 2 times daily. 1 each 1    hydrocortisone (ALA-SOTERO) 1 % cream Apply topically 2 times daily. 30 g 1     No current facility-administered medications for this visit. Allergies   Allergen Reactions    Ciprofloxacin-Ciproflox Hcl Er Other (See Comments)     Elevated creatinine    Heparin      Monitor for thrombocytopenia    Metformin Nausea Only     Abdominal pain, diarrhea    Niacin And Related Other (See Comments)     Burning sensation, severe flushing    Tramadol Hcl      Contraindication to seizure medications    Ultram [Tramadol]      Contraindication to seizure medications    Warfarin      Very difficult to regulate in past    Other      Other reaction(s): very difficult to regulate     Review of Systems - General ROS: negative for - chills or fever  Psychological ROS: negative for - anxiety and depression - controlled on medication, seeing psychiatry  Hematological and Lymphatic ROS: No history of bleeding disorder, positive DVT   Respiratory ROS: no cough, shortness of breath, or wheezing  Cardiovascular ROS: no chest pain or dyspnea on exertion  Gastrointestinal ROS: intermittent abdominal pain, no change in bowel habits, or black or bloody stools  Genito-Urinary ROS: no dysuria, trouble voiding, or hematuria  Musculoskeletal ROS: negative for - muscle pain or muscular weakness, positive pain in bilateral legs  Neurological ROS: negative for - headaches, or weakness - positive seizures at baseline to a little better, chronic neuropathy  Dermatological ROS: negative for - rash, positive ulcers of feet    Blood pressure 128/88, pulse 92, temperature 98.1 °F (36.7 °C), height 6' 2\" (1.88 m), weight 292 lb 3.2 oz (132.5 kg).     Physical Examination: General appearance -alert, well appearing, and in no distress  Head - atraumatic, normocephalic  Neck - supple, no significant adenopathy  Chest - clear to auscultation, no wheezes, rales or rhonchi, symmetric air entry  Heart - normal rate, regular rhythm, normal S1, S2, no murmurs, rubs, clicks or gallops  Abdomen -soft, nontender, nondistended  Neurological - alert, oriented, normal speech  Extremities - peripheral pulses normal, no clubbing or cyanosis  Skin - warm and dry, did not look at feet - dressing and boot intact - following with Dr. Grant Mascorro. Diagnostic data:   I have reviewed recent diagnostic testing including labs with patient today.     Tissue culture 3/4/22 - E.coli, strep group B  Nasal MRSA 3/10/22 - negative  Labs 3/21/22:  Results for orders placed or performed during the hospital encounter of 03/21/22   CBC with Auto Differential   Result Value Ref Range    WBC 9.5 4.8 - 10.8 thou/mm3    RBC 4.81 4.70 - 6.10 mill/mm3    Hemoglobin 14.8 14.0 - 18.0 gm/dl    Hematocrit 41.9 (L) 42.0 - 52.0 %    MCV 87.1 80.0 - 94.0 fL    MCH 30.8 26.0 - 33.0 pg    MCHC 35.3 32.2 - 35.5 gm/dl    RDW-CV 13.0 11.5 - 14.5 %    RDW-SD 40.5 35.0 - 45.0 fL    Platelets 510 656 - 467 thou/mm3    MPV 9.0 (L) 9.4 - 12.4 fL    Seg Neutrophils 58.8 %    Lymphocytes 23.6 %    Monocytes 6.6 %    Eosinophils 9.6 %    Basophils 0.2 %    Immature Granulocytes 1.2 %    Segs Absolute 5.6 1.8 - 7.7 thou/mm3    Lymphocytes Absolute 2.2 1.0 - 4.8 thou/mm3    Monocytes Absolute 0.6 0.4 - 1.3 thou/mm3    Eosinophils Absolute 0.9 (H) 0.0 - 0.4 thou/mm3    Basophils Absolute 0.0 0.0 - 0.1 thou/mm3    Immature Grans (Abs) 0.11 (H) 0.00 - 0.07 thou/mm3    nRBC 0 /100 wbc   C-Reactive Protein   Result Value Ref Range    CRP 0.66 0.00 - 1.00 mg/dl   Basic Metabolic Panel   Result Value Ref Range    Sodium 140 135 - 145 meq/L    Potassium 4.0 3.5 - 5.2 meq/L    Chloride 105 98 - 111 meq/L    CO2 23 23 - 33 meq/L    Glucose 195 (H) 70 - 108 mg/dL    BUN 9 7 - 22 mg/dL    CREATININE 0.7 0.4 - 1.2 mg/dL    Calcium 9.0 8.5 - 10.5 mg/dL   Lactate, Sepsis   Result Value Ref Range    Lactic Acid, Sepsis 1.4 0.5 - 1.9 mmol/L   Anion Gap   Result Value Ref Range    Anion Gap 12.0 8.0 - 16.0 meq/L   Glomerular Filtration Rate, Estimated   Result Value Ref Range    Est, Glom Filt Rate >90 ml/min/1.73m2     *Note: Due to a large number of results and/or encounters for the requested time period, some results have not been displayed. A complete set of results can be found in Results Review. 3/9/22 - Venous doppler Right LE:  Impression   Findings of acute/subacute deep venous thrombosis limited to one of the paired peroneal veins in the right calf. No appreciable change from prior study. 3/23/22 MRI right foot:  Impression   1. Wound defect with globular inflammatory changes in the plantar aspect of the second metatarsophalangeal joint is noted. No abnormal bone marrow signal on T1-weighted imaging to reflect MR evidence of osteomyelitis at this time. Continued clinical    surveillance is recommended.       2. Reactive bone marrow edema is seen in the proximal phalanx of the second digit.       3. Diffuse soft tissue swelling of the foot. Assessment and Plan:     Diagnosis Orders   1. Type 2 diabetes mellitus with hyperglycemia, with long-term current use of insulin ([address]     2. Type 2 diabetes mellitus with diabetic polyneuropathy, with long-term current use of insulin ([address]     3. Chronic deep vein thrombosis (DVT) of right peroneal vein (HCC)     4. Leg swelling     5. Sinus tachycardia  metoprolol tartrate (LOPRESSOR) 25 MG tablet   6. Vitamin D deficiency  vitamin D (ERGOCALCIFEROL) 1.25 MG (46885 UT) CAPS capsule   7. Non-intractable vomiting with nausea, unspecified vomiting type  promethazine (PHENERGAN) 12.5 MG tablet     DM - uncontrolled - need to increase Basaglar to 65 Units nightly, eat 3 meals a day even if small - suggested Glucerna high protein drink.   Chronic neuropathy and pain from ulcers - now on hydrocodone from Dr. Alejandro Gaines and Neurontin 600 mg QID. History of mildly elevated microalbumin - due for repeat now. Chronic DVT right LE - continue Pradaxa and need to contact Amanda Wahl to re-open case - they state paperwork was sent in. Will give samples. Leg edema - likely from DVT and ulcer/foot deformity - continue compression and elevation. Sinus tachycardia - on metoprolol and BP and HR controlled. Will refill. Vitamin D deficiency - with bone issues - need to monitor and continue supplementation - refill today. Last vitamin D level 18 4/21/22. Check lab with next visit. Nausea/vomiting - intermittent - suspected gastroparesis - following with GI. Asking for refill of Phenergan. Continue follow up with GI as coming up next week. Mixed hyperlipidemia - check Lipid panel/LDL with next set of labs. Follow up visit 6/2022. Trish Campo MD    [address] INTERNAL MEDICINE  [address]  Dept: [phone]  Dept Fax: [phone] : [phone]

## 2022-04-27 ENCOUNTER — CARE COORDINATION (OUTPATIENT)
Dept: CARE COORDINATION | Age: 52
End: 2022-04-27

## 2022-04-27 NOTE — CARE COORDINATION
Patient called stating he mailed me his portion of the Pradaxa application awhile ago. I explained I didn't get it plus I don't get mail anymore since I don't have an office. He has a copy of the information and is going to drop it off at [employer]. I suggested he calls me once he is able to do so, I will keep an eye out for the information.       [facility]   Medication Assistance  Nely Dias5 (P) [phone]  (R) [phone]
[Other: ____] : [unfilled]

## 2022-04-30 ENCOUNTER — HOSPITAL ENCOUNTER (OUTPATIENT)
Dept: MRI IMAGING | Age: 52
Discharge: HOME | End: 2022-04-30
Payer: MEDICARE

## 2022-04-30 DIAGNOSIS — L97.516 ULCER OF RIGHT FOOT WITH BONE INVOLVEMENT WITHOUT EVIDENCE OF NECROSIS (HCC): ICD-10-CM

## 2022-04-30 PROCEDURE — 73718 MRI LOWER EXTREMITY W/O DYE: CPT

## 2022-05-04 RX ORDER — DABIGATRAN ETEXILATE 150 MG/1
150 CAPSULE, COATED PELLETS ORAL 2 TIMES DAILY
Qty: 60 CAPSULE | Refills: 0 | Status: SHIPPED | COMMUNITY
Start: 2022-05-04 | End: 2022-05-16

## 2022-05-05 ENCOUNTER — OFFICE VISIT (OUTPATIENT)
Dept: INFECTIOUS DISEASES | Age: 52
End: 2022-05-05
Payer: MEDICARE

## 2022-05-05 ENCOUNTER — TELEPHONE (OUTPATIENT)
Dept: INTERNAL MEDICINE CLINIC | Age: 52
End: 2022-05-05

## 2022-05-05 VITALS
HEART RATE: 86 BPM | SYSTOLIC BLOOD PRESSURE: 142 MMHG | BODY MASS INDEX: 34.65 KG/M2 | DIASTOLIC BLOOD PRESSURE: 83 MMHG | TEMPERATURE: 98.1 F | OXYGEN SATURATION: 96 % | WEIGHT: 270 LBS | HEIGHT: 74 IN

## 2022-05-05 DIAGNOSIS — L97.515 DIABETIC ULCER OF RIGHT FOOT ASSOCIATED WITH TYPE 2 DIABETES MELLITUS, WITH MUSCLE INVOLVEMENT WITHOUT EVIDENCE OF NECROSIS, UNSPECIFIED PART OF FOOT (HCC): Primary | ICD-10-CM

## 2022-05-05 DIAGNOSIS — Z86.718 HISTORY OF DVT (DEEP VEIN THROMBOSIS): ICD-10-CM

## 2022-05-05 DIAGNOSIS — M79.89 SWELLING OF LEFT FOOT: Primary | ICD-10-CM

## 2022-05-05 DIAGNOSIS — E11.621 DIABETIC ULCER OF RIGHT FOOT ASSOCIATED WITH TYPE 2 DIABETES MELLITUS, WITH MUSCLE INVOLVEMENT WITHOUT EVIDENCE OF NECROSIS, UNSPECIFIED PART OF FOOT (HCC): Primary | ICD-10-CM

## 2022-05-05 PROCEDURE — G8427 DOCREV CUR MEDS BY ELIG CLIN: HCPCS | Performed by: INTERNAL MEDICINE

## 2022-05-05 PROCEDURE — 2022F DILAT RTA XM EVC RTNOPTHY: CPT | Performed by: INTERNAL MEDICINE

## 2022-05-05 PROCEDURE — 1036F TOBACCO NON-USER: CPT | Performed by: INTERNAL MEDICINE

## 2022-05-05 PROCEDURE — 99214 OFFICE O/P EST MOD 30 MIN: CPT | Performed by: INTERNAL MEDICINE

## 2022-05-05 PROCEDURE — 3017F COLORECTAL CA SCREEN DOC REV: CPT | Performed by: INTERNAL MEDICINE

## 2022-05-05 PROCEDURE — G8417 CALC BMI ABV UP PARAM F/U: HCPCS | Performed by: INTERNAL MEDICINE

## 2022-05-05 PROCEDURE — 3051F HG A1C>EQUAL 7.0%<8.0%: CPT | Performed by: INTERNAL MEDICINE

## 2022-05-05 NOTE — TELEPHONE ENCOUNTER
Patient called stating that he seen infectious disease in [place] today and the doctor suggeated that he contact you for a doppler of his left foot due to on going swelling and his history of DVT . Adiel Fisher states that the swelling has continued even with wrapping his foot daily . Okay to order?

## 2022-05-05 NOTE — PROGRESS NOTES
[facility]  Office Progress Note  Today's Date and Time: 5/5/2022, 10:22 AM    Impression:     1. Diabetic ulcer of right foot associated with type 2 diabetes mellitus, with muscle involvement without evidence of necrosis, unspecified part of foot ([address]         Recommendations   · The patient's plantar aspect ulceration in the right foot is clean. · While there is palpable bone in the wound bed it is not exposed and the wound bed has beefy red ulceration. · At this point in time I would just recommend continuing oral antimicrobial therapy with cefdinir which covers the isolated organisms. · Continue local wound care and it is - that the wound has been progressing well/healing. · The patient will need to maintain offloading measures to help this to heal    I have ordered the following medications/ labs:  No orders of the defined types were placed in this encounter. No orders of the defined types were placed in this encounter. Chief complaint/reason for consultation:     Chief Complaint   Patient presents with    Sinusitis     f/u infection of skin         History of Present Illness:   Mattie Krishnamurthy is a 46y.o.-year-old male who I am seeing in the office in consultation for a diabetic foot ulcer. The patient has a history of diabetes mellitus type 1 with associated neuropathy. The patient has Charcot deformity in the left foot. The patient has a chronic ulceration on the right plantar foot with bone involvement without necrosis. The patient had an MRI done that showed a 1.6 x 1.6 ulcer over the plantar aspect of the second proximal phalanx with no definite evidence of osteomyelitis involving the second digit. There was some increased signal intensity in the soft tissues surrounding the second and third digits over the dorsum of the foot suggesting edema and cellulitis.   There are some degenerative changes involving the calcaneocuboid articulation and there was no drainable abscess. The patient has been on different antibiotics including Augmentin and is currently on Omnicef. I was asked to evaluate and help with antibiotic choice. The patient does not report any subjective fevers or chills, no cough or shortness of breath, no abdominal pain nausea vomiting or diarrhea. No dysuria or frequency. A wound culture done 4/22/2022 did show methicillin susceptible Staph aureus, group B streptococcus, and E. Coli  The E. coli was pan susceptible and the Staphylococcus was MSSA. I have personally reviewedthe past medical history, medications, social history, and I have updated the database accordingly. Past Medical History:     Past Medical History:   Diagnosis Date    Abnormal thyroid biopsy     s/p left hemithyroidectomy 3/6789 - follicular adenoma    Acid reflux     Anemia     resolved - previously seen by Dr. Matthew Denise (due to enlarged spleen)    Anesthesia     seizures with brain surgery due to blood sugar got really high    Bile reflux gastritis     per EGD 11/16/21 - Dr. Joshua Karimi - to start Carafate.  Bipolar disorder [address] [address]     Blood clot in vein 04/07/2016    [facility]     Cancer [facility]) 04/2019    thyroid    Chest pain     previously seeing [facility] cardiologist, now seeing Dr. Yamile Fernandez (LAD bridging on cath 4/2016)    Chronic back pain     Chronic bronchitis ([facility] [address]     Dr. Rosa Lewis Colon polyp 08/30/2016    Depression     seeing Mimi Kingsley DVT (deep venous thrombosis) ([facility] [address] [phone]    not on Coumadin due to unable to regulate - Dr. Freedom Stauffer - no etiology found per patient    Esophageal abnormality     nodule     Fatty liver disease, nonalcoholic     U/S 73/2944 [facility]    Furuncle     legs    History of Doppler ultrasound 05/29/2011    No hemodynamically significant carotid stenosis is identified. A thyroid nodule on each side. Dedicated ultrasound of thyroid gland is suggested to further evaluate if clinically indicated.      History of pulmonary embolism 2007    s/p GFF, related to knee surgery    Hx of blood clots     leg and lung    Hyperlipidemia     severely elevated triglycerides    Hypertension     diastolic    Intracranial arachnoid cyst 11/2012    Dr. Aftab Cole drained, complicated with seizures, DKA    Irritable bowel syndrome     Liver disease     Kaleb Edwards - elevated LFT - positive smooth muscle antibody, steatosis per liver bx 3/2014 with Dr. Kayla Ontiveros (multiple drug resistant organisms) resistance 2012    MRSA (methicillin resistant staph aureus) culture positive     h/o in foot and before brain surgery    Nephrolithiasis     noted on CT abdomen 9/2016, 6/2019    Neuropathy     Pancreatic insufficiency     Positive SANDY (antinuclear antibody)     Dr. Rosalba German - first visit in 6/2013    Prolonged emergence from general anesthesia     Restless legs syndrome     Seizures ([address]     started with brain surgery    Sinus tachycardia     Holter 3/2013 - seeing Dr. Fely Mireles Skull fracture [facility])     clips     Sleep apnea     positive sleep apnea per study 10/2020.  Systolic dysfunction     \"systolic bridge\"  EF 93% ECHO 9/2019    Type II or unspecified type diabetes mellitus without mention of complication, not stated as uncontrolled 2007     Medications:     Current Outpatient Medications   Medication Sig Dispense Refill    dabigatran (PRADAXA) 150 MG capsule Take 1 capsule by mouth 2 times daily 60 capsule 0    HYDROcodone-acetaminophen (NORCO) 5-325 MG per tablet TAKE 1 TABLET BY MOUTH TWO TIMES A DAY AS NEEDED FOR 7 DAYS      gabapentin (NEURONTIN) 600 MG tablet Take 600 mg by mouth 4 times daily.       vitamin D (ERGOCALCIFEROL) 1.25 MG (43044 UT) CAPS capsule Take 1 capsule by mouth once a week 12 capsule 1    promethazine (PHENERGAN) 12.5 MG tablet Take 1-2 tablets by mouth every 8 hours as needed for Nausea 60 tablet 1    metoprolol tartrate (LOPRESSOR) 25 MG tablet Take 1 tablet by mouth 2 times daily 180 tablet 1    lacosamide (VIMPAT) 200 MG tablet Take 1 tablet by mouth 2 times daily for 90 days. 180 tablet 2    LORazepam (ATIVAN) 0.5 MG tablet Lorazepam Active 0.5 MG PO as needed 20 tablet 0    amoxicillin-clavulanate (AUGMENTIN) 875-125 MG per tablet TAKE 1 TABLET BY MOUTH EVERY TWELVE HOURS FOR 10 DAYS.  blood glucose test strips (ONETOUCH ULTRA) strip 1 each by In Vitro route 5 times daily DX: E11.65 Dispense Onetouch Ultra 2 test strips 450 each 3    dabigatran (PRADAXA) 150 MG capsule Take 1 capsule by mouth 2 times daily 60 capsule 0    sucralfate (CARAFATE) 1 GM tablet Take 1 g by mouth 4 times daily      rOPINIRole (REQUIP) 1 MG tablet TAKE 1 TABLET BY MOUTH THREE TIMES A  tablet 1    ARIPiprazole (ABILIFY) 5 MG tablet Take 10 mg by mouth daily       vitamin B-12 (CYANOCOBALAMIN) 1000 MCG tablet Take 1,000 mcg by mouth 2 times daily       insulin glargine (BASAGLAR KWIKPEN) 100 UNIT/ML injection pen Inject 60 Units into the skin 2 times daily       DULoxetine (CYMBALTA) 60 MG extended release capsule Take 120 mg by mouth daily      levETIRAcetam (KEPPRA) 1000 MG tablet Take 2 tablets by mouth 2 times daily 60 tablet 3    zonisamide (ZONEGRAN) 100 MG capsule Take 5 capsules by mouth nightly (Patient taking differently: Take 300 mg by mouth nightly ) 30 capsule 3    insulin lispro (HUMALOG) 100 UNIT/ML injection vial Takes 25 units with meals plus group 2 sliding scale      pantoprazole (PROTONIX) 40 MG tablet Take 1 tablet by mouth 2 times daily 180 tablet 1    sildenafil (VIAGRA) 100 MG tablet Take 1 tablet by mouth as needed for Erectile Dysfunction 45 tablet 3    primidone (MYSOLINE) 50 MG tablet Take 50 mg by mouth 3 times daily (Patient not taking: Reported on 4/26/2022)       No current facility-administered medications for this visit.       Allergies:   Ciprofloxacin-ciproflox hcl er, Heparin, Metformin, Niacin and related, Tramadol hcl, Ultram [tramadol], Warfarin, and Other Review of Systems:   Review of Systems   Constitutional: Negative. Respiratory: Negative. Cardiovascular: Negative. Gastrointestinal: Negative. Genitourinary: Negative. Musculoskeletal: Negative. Skin: Positive for wound. Allergic/Immunologic: Negative. Neurological: Negative. Physical Examination :   BP (!) 142/83 (Site: Left Upper Arm, Position: Sitting, Cuff Size: Large Adult)   Pulse 86   Temp 98.1 °F (36.7 °C)   Ht 6' 2\" (1.88 m)   Wt 270 lb (122.5 kg)   SpO2 96%   BMI 34.67 kg/m²     Physical Exam  Constitutional:       Appearance: He is well-developed. HENT:      Head: Normocephalic and atraumatic. Cardiovascular:      Rate and Rhythm: Normal rate. Heart sounds: Normal heart sounds. No friction rub. No gallop. Pulmonary:      Effort: Pulmonary effort is normal.      Breath sounds: Normal breath sounds. No wheezing. Abdominal:      General: Bowel sounds are normal.      Palpations: Abdomen is soft. There is no mass. Tenderness: There is no abdominal tenderness. Musculoskeletal:         General: Normal range of motion. Cervical back: Normal range of motion and neck supple. Left lower leg: Edema present. Lymphadenopathy:      Cervical: No cervical adenopathy. Skin:     General: Skin is warm and dry. Neurological:      Mental Status: He is alert and oriented to person, place, and time.                  Laboratory studies :  Medical Decision Making:   I have independently reviewed the following labs:  CBC with Differential:  Lab Results   Component Value Date    WBC 9.5 03/21/2022    WBC 7.4 12/30/2021    HGB 14.8 03/21/2022    HGB 14.8 12/30/2021    HCT 41.9 03/21/2022    HCT 42.2 12/30/2021     03/21/2022     12/30/2021    SEGSPCT 58.8 03/21/2022    SEGSPCT 80.2 12/30/2021    LYMPHOPCT 35 12/01/2021    LYMPHOPCT 37 08/05/2021    LYMPHOPCT 31.9 09/22/2016    LYMPHOPCT 16.8 11/12/2015    MONOPCT 6.6 03/21/2022    MONOPCT 7.6 12/30/2021    MONOPCT 6.0 09/22/2016    MONOPCT 4.5 11/12/2015    EOSPCT 6.2 09/22/2016    EOSPCT 1.1 11/12/2015       BMP:  Lab Results   Component Value Date     03/21/2022     12/30/2021    K 4.0 03/21/2022    K 4.0 12/30/2021    K 4.0 12/17/2021    K 4.1 11/12/2021     03/21/2022     12/30/2021    CO2 23 03/21/2022    CO2 24 12/30/2021    BUN 9 03/21/2022    BUN 8 12/30/2021    CREATININE 0.7 03/21/2022    CREATININE 0.8 12/30/2021    MG 1.7 11/13/2021    MG 1.9 07/29/2021       Hepatic Function Panel:   Lab Results   Component Value Date    PROT 6.4 12/01/2021    PROT 7.5 11/12/2021    LABALBU 4.1 12/01/2021    LABALBU 4.5 11/12/2021    LABALBU 4.3 03/23/2012    LABALBU 3.7 01/06/2012    BILIDIR 0.15 07/29/2021    BILIDIR 0.16 07/28/2021    IBILI 0.46 07/29/2021    IBILI 0.37 07/28/2021    BILITOT 0.58 12/01/2021    BILITOT 0.5 11/12/2021    ALKPHOS 89 12/01/2021    ALKPHOS 96 11/12/2021    ALT 24 12/01/2021    ALT 24 11/12/2021    AST 16 12/01/2021    AST 16 11/12/2021       Lab Results   Component Value Date    CRP 0.66 03/21/2022    CRP 0.47 09/29/2021     Lab Results   Component Value Date    SEDRATE 4 07/31/2021    SEDRATE 2 03/21/2019         Imaging Studies:   MRI FOOT RIGHT WO CONTRAST  Impression       1. 1.6 x 1.6 cm ulcer over the plantar aspect of the second proximal phalanx smaller than on previous study dated 23rd of March 2022. No definite evidence of osteomyelitis involving the second digit. 2. Small area of abnormal signal intensity in the head of the third metatarsal new since previous study dated 23rd of March 2022. Please correlate clinically. 3. Increased signal intensity in the soft tissues surrounding the second and third digits, over the dorsum of the foot and over the medial and lateral malleoli suggestive of edema and/or cellulitis. No drainable abscess. 4. Degenerative changes especially involving the calcaneocuboid articulation.  Abnormal signal intensity along the inferior aspect of the talus probably representing degenerative changes. 5. Atrophy involving the foot muscles. 6. The appearance the right foot is otherwise unchanged since previous study dated 23rd of March 2022.                   **This report has been created using voice recognition software. It may contain minor errors which are inherent in voice recognition technology. **       Final report electronically signed by DR Mary Kate Mason on 5/2/2022 10:21 AM       Cultures:   As noted above    Thank you for allowing us to participate in the care of this patient. Pleasecall with questions. Haley Pepe MD  [employer] messaging: [phone]    This note is created with the assistance of a speech recognition program.  While intending to generate a document that actually reflects the content ofthe visit, the document can still have some errors including those of syntax and sound a like substitutions which may escape proof reading. It such instances, actual meaning can be extrapolated by contextual diversion.

## 2022-05-09 ENCOUNTER — TELEPHONE (OUTPATIENT)
Dept: INTERNAL MEDICINE CLINIC | Age: 52
End: 2022-05-09

## 2022-05-09 ENCOUNTER — HOSPITAL ENCOUNTER (EMERGENCY)
Age: 52
Discharge: HOME | End: 2022-05-09
Attending: EMERGENCY MEDICINE
Payer: MEDICARE

## 2022-05-09 ENCOUNTER — APPOINTMENT (OUTPATIENT)
Dept: INTERVENTIONAL RADIOLOGY/VASCULAR | Age: 52
End: 2022-05-09
Payer: MEDICARE

## 2022-05-09 VITALS
SYSTOLIC BLOOD PRESSURE: 145 MMHG | BODY MASS INDEX: 34.65 KG/M2 | HEIGHT: 74 IN | RESPIRATION RATE: 18 BRPM | HEART RATE: 86 BPM | DIASTOLIC BLOOD PRESSURE: 83 MMHG | OXYGEN SATURATION: 93 % | WEIGHT: 270 LBS

## 2022-05-09 DIAGNOSIS — M79.605 LEFT LEG PAIN: Primary | ICD-10-CM

## 2022-05-09 LAB
ANION GAP SERPL CALCULATED.3IONS-SCNC: 13 MEQ/L (ref 8–16)
BASOPHILS # BLD: 0.4 %
BASOPHILS ABSOLUTE: 0 THOU/MM3 (ref 0–0.1)
BUN BLDV-MCNC: 7 MG/DL (ref 7–22)
CALCIUM SERPL-MCNC: 8.9 MG/DL (ref 8.5–10.5)
CHLORIDE BLD-SCNC: 100 MEQ/L (ref 98–111)
CO2: 26 MEQ/L (ref 23–33)
CREAT SERPL-MCNC: 0.6 MG/DL (ref 0.4–1.2)
D-DIMER QUANTITATIVE: 628 NG/ML FEU (ref 0–500)
EOSINOPHIL # BLD: 1.7 %
EOSINOPHILS ABSOLUTE: 0.1 THOU/MM3 (ref 0–0.4)
ERYTHROCYTE [DISTWIDTH] IN BLOOD BY AUTOMATED COUNT: 12.8 % (ref 11.5–14.5)
ERYTHROCYTE [DISTWIDTH] IN BLOOD BY AUTOMATED COUNT: 39.7 FL (ref 35–45)
GFR SERPL CREATININE-BSD FRML MDRD: > 90 ML/MIN/1.73M2
GLUCOSE BLD-MCNC: 173 MG/DL (ref 70–108)
HCT VFR BLD CALC: 41.1 % (ref 42–52)
HEMOGLOBIN: 14.2 GM/DL (ref 14–18)
IMMATURE GRANS (ABS): 0.06 THOU/MM3 (ref 0–0.07)
IMMATURE GRANULOCYTES: 0.7 %
LYMPHOCYTES # BLD: 18.8 %
LYMPHOCYTES ABSOLUTE: 1.6 THOU/MM3 (ref 1–4.8)
MCH RBC QN AUTO: 30 PG (ref 26–33)
MCHC RBC AUTO-ENTMCNC: 34.5 GM/DL (ref 32.2–35.5)
MCV RBC AUTO: 86.7 FL (ref 80–94)
MONOCYTES # BLD: 9.4 %
MONOCYTES ABSOLUTE: 0.8 THOU/MM3 (ref 0.4–1.3)
NUCLEATED RED BLOOD CELLS: 0 /100 WBC
PLATELET # BLD: 271 THOU/MM3 (ref 130–400)
PMV BLD AUTO: 9.8 FL (ref 9.4–12.4)
POTASSIUM REFLEX MAGNESIUM: 4 MEQ/L (ref 3.5–5.2)
RBC # BLD: 4.74 MILL/MM3 (ref 4.7–6.1)
SEG NEUTROPHILS: 69 %
SEGMENTED NEUTROPHILS ABSOLUTE COUNT: 5.8 THOU/MM3 (ref 1.8–7.7)
SODIUM BLD-SCNC: 139 MEQ/L (ref 135–145)
WBC # BLD: 8.4 THOU/MM3 (ref 4.8–10.8)

## 2022-05-09 PROCEDURE — 36415 COLL VENOUS BLD VENIPUNCTURE: CPT

## 2022-05-09 PROCEDURE — 99284 EMERGENCY DEPT VISIT MOD MDM: CPT

## 2022-05-09 PROCEDURE — 85025 COMPLETE CBC W/AUTO DIFF WBC: CPT

## 2022-05-09 PROCEDURE — 80048 BASIC METABOLIC PNL TOTAL CA: CPT

## 2022-05-09 PROCEDURE — 93971 EXTREMITY STUDY: CPT

## 2022-05-09 PROCEDURE — 85379 FIBRIN DEGRADATION QUANT: CPT

## 2022-05-09 RX ORDER — HYDROCODONE BITARTRATE AND ACETAMINOPHEN 5; 325 MG/1; MG/1
1 TABLET ORAL ONCE
Status: DISCONTINUED | OUTPATIENT
Start: 2022-05-09 | End: 2022-05-09 | Stop reason: HOSPADM

## 2022-05-09 ASSESSMENT — ENCOUNTER SYMPTOMS
GASTROINTESTINAL NEGATIVE: 1
RESPIRATORY NEGATIVE: 1
ALLERGIC/IMMUNOLOGIC NEGATIVE: 1
EYES NEGATIVE: 1

## 2022-05-09 ASSESSMENT — PAIN - FUNCTIONAL ASSESSMENT: PAIN_FUNCTIONAL_ASSESSMENT: 0-10

## 2022-05-09 ASSESSMENT — PAIN SCALES - GENERAL: PAINLEVEL_OUTOF10: 8

## 2022-05-09 ASSESSMENT — PAIN DESCRIPTION - ORIENTATION: ORIENTATION: LEFT

## 2022-05-09 ASSESSMENT — PAIN DESCRIPTION - LOCATION: LOCATION: LEG;FOOT

## 2022-05-09 NOTE — TELEPHONE ENCOUNTER
Patient called to let you know he is going to the ER . Patient states that he is having left leg pain and swelling up to his knee. Patient [facility] .

## 2022-05-09 NOTE — ED NOTES
Patient presents to the ED with complaints of having left foot and leg pain.       Amarilis Byrd, SEVERIANO  25/80/38 [id number]

## 2022-05-09 NOTE — ED PROVIDER NOTES
[place] ENCOUNTER          Pt Name: Alisha Sexton  MRN: [id number]  [place] 1970  Date of evaluation: 5/9/2022  Treating Resident Physician: Aries Perez DPM  Supervising Physician: Mago Mccoy MD    [address] [place] [address]       Chief Complaint   Patient presents with    Foot Pain     left    Leg Pain     History obtained from the patient. HISTORY OF PRESENT ILLNESS    HPI  Alisha Sexton is a 46 y.o. male who presents to the emergency department for evaluation of left leg and foot pain and swelling. Patient states the pain in his left leg began 6 weeks ago. He has been treated in office by Dr. Kelsey Shi for chronic ulcerations to his right foot. Patient states he was placed in a walking boot last Monday by Dr. Henri Shepherd on his left side. He states this has not helped the pain. He denies chest pain or shortness of breath. The patient has no other acute complaints at this time. REVIEW OF SYSTEMS   Review of Systems   Constitutional: Negative. HENT: Negative. Eyes: Negative. Respiratory: Negative. Cardiovascular: Positive for leg swelling. Gastrointestinal: Negative. Endocrine: Negative. Genitourinary: Negative. Musculoskeletal: Positive for gait problem and joint swelling. Allergic/Immunologic: Negative. Hematological: Negative. Psychiatric/Behavioral: Negative. PAST MEDICAL AND SURGICAL HISTORY     Past Medical History:   Diagnosis Date    Abnormal thyroid biopsy     s/p left hemithyroidectomy 6/7573 - follicular adenoma    Acid reflux     Anemia     resolved - previously seen by Dr. Angela Elena (due to enlarged spleen)    Anesthesia     seizures with brain surgery due to blood sugar got really high    Bile reflux gastritis     per EGD 11/16/21 - Dr. Sharon Blackburn - to start Carafate.     Bipolar disorder ([facility] [address]     Blood clot in vein 04/07/2016    Ismael Caddo     Cancer [facility]) 04/2019 thyroid    Chest pain     previously seeing [address] [place] [address] cardiologist, now seeing Dr. Charles Matos (LAD bridging on cath 4/2016)    Chronic back pain     Chronic bronchitis ([facility] [address]     Dr. Giovanni Rosado Colon polyp 08/30/2016    Depression     seeing Mauricio Figueroa DVT (deep venous thrombosis) ([facility] [address] [id number]    not on Coumadin due to unable to regulate - Dr. Clement Session - no etiology found per patient    Esophageal abnormality     nodule     Fatty liver disease, nonalcoholic     [facility]    Furuncle     legs    History of Doppler ultrasound 05/29/2011    No hemodynamically significant carotid stenosis is identified. A thyroid nodule on each side. Dedicated ultrasound of thyroid gland is suggested to further evaluate if clinically indicated.  History of pulmonary embolism 2007    s/p GFF, related to knee surgery    Hx of blood clots     leg and lung    Hyperlipidemia     severely elevated triglycerides    Hypertension     diastolic    Intracranial arachnoid cyst 11/2012    Dr. Latrell Sheppard drained, complicated with seizures, DKA    Irritable bowel syndrome     Liver disease     Daisha Copeland - elevated LFT - positive smooth muscle antibody, steatosis per liver bx 3/2014 with Dr. Ranelle Heimlich (multiple drug resistant organisms) resistance 2012    MRSA (methicillin resistant staph aureus) culture positive     h/o in foot and before brain surgery    Nephrolithiasis     noted on CT abdomen 9/2016, 6/2019    Neuropathy     Pancreatic insufficiency     Positive SANDY (antinuclear antibody)     Dr. Gris Pascal - first visit in 6/2013    Prolonged emergence from general anesthesia     Restless legs syndrome     Seizures ([facility] [address]     started with brain surgery    Sinus tachycardia     Holter 3/2013 - seeing Dr. Yissel Aviles fracture [facility])     clips     Sleep apnea     positive sleep apnea per study 10/2020.     Systolic dysfunction     \"systolic bridge\"  EF 59% ECHO 9/2019    Type II or unspecified type diabetes mellitus without mention of complication, not stated as uncontrolled 2007     Past Surgical History:   Procedure Laterality Date    CARDIAC CATHETERIZATION      2011,5-6 years ago   [address]  3-2012    CARDIAC CATHETERIZATION  04/28/2016    [facility]     CARPAL TUNNEL RELEASE  01/2017    CHOLECYSTECTOMY  2004    COLONOSCOPY  2012    COLONOSCOPY  08/2016    2 tubular adenomas - reportedly needs repeat scope in 6 months    CRANIOTOMY  11/2012    arachnoid cyst drainage    EKG 12-LEAD  10/18/2015         ENDOSCOPY, COLON, DIAGNOSTIC      HERNIA REPAIR Right 1996    [facility]--Dr. Inez Ramirez INGUINAL HERNIA REPAIR Right 09/15/2016    Robotic assisted    KNEE ARTHROSCOPY Right 2016    KNEE SURGERY Left 2007    acl and debrided twice    OTHER SURGICAL HISTORY  5-31-11    Tilt table was associated w/ nonspecific symptoms or nausea, otherwise no significant dizziness or syncope. No significant hemodynamic changes. Otherwise, unremarkable tilt table test after 30 minutes of tilting.  OTHER SURGICAL HISTORY  01/20/2017    RIGHT SHOULDER ARTHROSCOPY, OPEN STAN, OPEN ACROMIOPLASTY, BICEP TENDESIS, RIGHT CARPAL TUNNEL RELEASE    SHOULDER SURGERY Right 01/2007    THYROID LOBECTOMY N/A 1/15/2021    THYROID LOBECTOMY, UVULOPALATOPHARYNGOPLAST LAOP performed by Criss Blanchard MD at [address] [place] [address] ECHOCARDIOGRAM  3-05-11    LV size and systolic function normal. EF 55-65%.      UPPER GASTROINTESTINAL ENDOSCOPY  2012    UPPER GASTROINTESTINAL ENDOSCOPY  2016    Dr. Rosalind Cota Left 10/22/2019    EGD DILATION SAVORY performed by Nova Hassan MD at [address] [place] [address] ENDOSCOPY Left 10/22/2019    EGD BIOPSY performed by Nova Hassan MD at [address] [place] [address] ENDOSCOPY N/A 11/16/2021    EGD performed by Nova Hassan MD at [address] [place] [address] ENDOSCOPY Left 11/16/2021    EGD BIOPSY performed by Yadira Tolentino MD at [address]  2007         MEDICATIONS     Current Facility-Administered Medications:     HYDROcodone-acetaminophen (NORCO) 5-325 MG per tablet 1 tablet, 1 tablet, Oral, Once, Ubaldo Wiley DPM    Current Outpatient Medications:     dabigatran (PRADAXA) 150 MG capsule, Take 1 capsule by mouth 2 times daily, Disp: 60 capsule, Rfl: 0    HYDROcodone-acetaminophen (NORCO) 5-325 MG per tablet, TAKE 1 TABLET BY MOUTH TWO TIMES A DAY AS NEEDED FOR 7 DAYS, Disp: , Rfl:     gabapentin (NEURONTIN) 600 MG tablet, Take 600 mg by mouth 4 times daily. , Disp: , Rfl:     vitamin D (ERGOCALCIFEROL) 1.25 MG (34173 UT) CAPS capsule, Take 1 capsule by mouth once a week, Disp: 12 capsule, Rfl: 1    promethazine (PHENERGAN) 12.5 MG tablet, Take 1-2 tablets by mouth every 8 hours as needed for Nausea, Disp: 60 tablet, Rfl: 1    metoprolol tartrate (LOPRESSOR) 25 MG tablet, Take 1 tablet by mouth 2 times daily, Disp: 180 tablet, Rfl: 1    lacosamide (VIMPAT) 200 MG tablet, Take 1 tablet by mouth 2 times daily for 90 days. , Disp: 180 tablet, Rfl: 2    LORazepam (ATIVAN) 0.5 MG tablet, Lorazepam Active 0.5 MG PO as needed, Disp: 20 tablet, Rfl: 0    amoxicillin-clavulanate (AUGMENTIN) 875-125 MG per tablet, TAKE 1 TABLET BY MOUTH EVERY TWELVE HOURS FOR 10 DAYS., Disp: , Rfl:     blood glucose test strips (ONETOUCH ULTRA) strip, 1 each by In Vitro route 5 times daily DX: E11.65 Dispense Onetouch Ultra 2 test strips, Disp: 450 each, Rfl: 3    primidone (MYSOLINE) 50 MG tablet, Take 50 mg by mouth 3 times daily (Patient not taking: Reported on 4/26/2022), Disp: , Rfl:     dabigatran (PRADAXA) 150 MG capsule, Take 1 capsule by mouth 2 times daily, Disp: 60 capsule, Rfl: 0    sucralfate (CARAFATE) 1 GM tablet, Take 1 g by mouth 4 times daily, Disp: , Rfl:     rOPINIRole (REQUIP) 1 MG tablet, TAKE 1 TABLET BY MOUTH THREE TIMES A DAY, Disp: 270 tablet, Rfl: 1    ARIPiprazole (ABILIFY) 5 MG tablet, Take 10 mg by mouth daily , Disp: , Rfl:     vitamin B-12 (CYANOCOBALAMIN) 1000 MCG tablet, Take 1,000 mcg by mouth 2 times daily , Disp: , Rfl:     insulin glargine (BASAGLAR KWIKPEN) 100 UNIT/ML injection pen, Inject 60 Units into the skin 2 times daily , Disp: , Rfl:     DULoxetine (CYMBALTA) 60 MG extended release capsule, Take 120 mg by mouth daily, Disp: , Rfl:     levETIRAcetam (KEPPRA) 1000 MG tablet, Take 2 tablets by mouth 2 times daily, Disp: 60 tablet, Rfl: 3    zonisamide (ZONEGRAN) 100 MG capsule, Take 5 capsules by mouth nightly (Patient taking differently: Take 300 mg by mouth nightly ), Disp: 30 capsule, Rfl: 3    insulin lispro (HUMALOG) 100 UNIT/ML injection vial, Takes 25 units with meals plus group 2 sliding scale, Disp: , Rfl:     pantoprazole (PROTONIX) 40 MG tablet, Take 1 tablet by mouth 2 times daily, Disp: 180 tablet, Rfl: 1    sildenafil (VIAGRA) 100 MG tablet, Take 1 tablet by mouth as needed for Erectile Dysfunction, Disp: 45 tablet, Rfl: 3      SOCIAL HISTORY     Social History     Social History Narrative    Not on file     Social History     Tobacco Use    Smoking status: Never Smoker    Smokeless tobacco: Never Used   Vaping Use    Vaping Use: Never used   Substance Use Topics    Alcohol use: No     Alcohol/week: 0.0 standard drinks    Drug use: No         ALLERGIES     Allergies   Allergen Reactions    Ciprofloxacin-Ciproflox Hcl Er Other (See Comments)     Elevated creatinine    Heparin      Monitor for thrombocytopenia    Metformin Nausea Only     Abdominal pain, diarrhea    Niacin And Related Other (See Comments)     Burning sensation, severe flushing    Tramadol Hcl      Contraindication to seizure medications    Ultram [Tramadol]      Contraindication to seizure medications    Warfarin      Very difficult to regulate in past    Other      Other reaction(s): very difficult to regulate         FAMILY HISTORY Family History   Problem Relation Age of Onset    Heart Disease Father 61        MI    Stroke Brother     Obesity Brother     Arthritis Mother     Seizures Mother     Obesity Sister     Other Brother         pulmonary embolism    Diabetes Daughter     Seizures Brother          PREVIOUS RECORDS   Previous records reviewed: previous visit 12/31/21. PHYSICAL EXAM     ED Triage Vitals [05/09/22 1126]   BP Temp Temp Source Pulse Resp SpO2 Height Weight   (!) 145/83 -- Oral 86 18 93 % 6' 2\" (1.88 m) 270 lb (122.5 kg)     Initial vital signs and nursing assessment reviewed and abnormal from elevated BP. Body mass index is 34.67 kg/m². Pulsoximetry is normal per my interpretation. Additional Vital Signs:  Vitals:    05/09/22 1126   BP: (!) 145/83   Pulse: 86   Resp: 18   SpO2: 93%       Physical Exam  Constitutional:       Appearance: Normal appearance. He is obese. HENT:      Head: Normocephalic and atraumatic. Right Ear: Tympanic membrane, ear canal and external ear normal.      Left Ear: Tympanic membrane, ear canal and external ear normal.      Nose: Nose normal.      Mouth/Throat:      Mouth: Mucous membranes are moist.      Pharynx: Oropharynx is clear. Eyes:      Extraocular Movements: Extraocular movements intact. Conjunctiva/sclera: Conjunctivae normal.      Pupils: Pupils are equal, round, and reactive to light. Cardiovascular:      Rate and Rhythm: Normal rate and regular rhythm. Pulses: Normal pulses. Heart sounds: Normal heart sounds. Pulmonary:      Effort: Pulmonary effort is normal.      Breath sounds: Normal breath sounds. Abdominal:      Palpations: Abdomen is soft. Tenderness: There is no abdominal tenderness. Musculoskeletal:      Right lower leg: Edema present. Left lower leg: Edema present. Skin:     General: Skin is warm and dry. Capillary Refill: Capillary refill takes 2 to 3 seconds. Findings: Erythema present. Comments: Erythema and edema noted from foot to tibial tuberosity left. Neurological:      General: No focal deficit present. Mental Status: He is alert and oriented to person, place, and time. Psychiatric:         Mood and Affect: Mood normal.         Behavior: Behavior normal.         Thought Content: Thought content normal.         Judgment: Judgment normal.             MEDICAL DECISION MAKING   Initial Assessment:   1. DVT vs cellulitis. Plan:    VL Duplex US LLE vein   CBC, BMP, D-Dimer.  [place] x1 in ED   F/U Outpatient with Dr. Grant Mascorro Wednesday        ED RESULTS   Laboratory results:  Labs Reviewed   CBC WITH AUTO DIFFERENTIAL - Abnormal; Notable for the following components:       Result Value    Hematocrit 41.1 (*)     All other components within normal limits   BASIC METABOLIC PANEL W/ REFLEX TO MG FOR LOW K - Abnormal; Notable for the following components:    Glucose 173 (*)     All other components within normal limits   D-DIMER, QUANTITATIVE - Abnormal; Notable for the following components:    D-Dimer, Quant 628.00 (*)     All other components within normal limits   ANION GAP   GLOMERULAR FILTRATION RATE, ESTIMATED       Radiologic studies results:  VL DUP LOWER EXTREMITY VENOUS LEFT   Final Result   Normal venous ultrasound. No evidence for acute deep venous thrombosis. **This report has been created using voice recognition software. It may contain minor errors which are inherent in voice recognition technology. **      Final report electronically signed by Dr. Prosper East on 5/9/2022 1:17 PM          ED Medications administered this visit:   Medications   HYDROcodone-acetaminophen (Esther Lurdes) 5-325 MG per tablet 1 tablet (has no administration in time range)         ED COURSE      US negative for DVT, blood work looks encouraging for ruling out infectious process. We will DC to home, patient has f/u appointment with Dr. Grant Mascorro this coming Wednesday.     Strict return precautions and follow up instructions were discussed with the patient prior to discharge, with which the patient agrees. MEDICATION CHANGES     New Prescriptions    No medications on file         FINAL DISPOSITION     Final diagnoses:   Left leg pain     Condition: condition: fair  Dispo: Discharge to home      This transcription was electronically signed. Parts of this transcriptions may have been dictated by use of voice recognition software and electronically transcribed, and parts may have been transcribed with the assistance of an ED scribe. The transcription may contain errors not detected in proofreading. Please refer to my supervising physician's documentation if my documentation differs. Electronically Signed: Trent Lobo DPM, 05/09/22, 2:25 PM         Helen Marina DPM  Resident  05/09/22 6777    Attending Supervising [de-identified] Attestation Statement  I performed a history and physical examination on the patient and discussed the management with the resident physician. I reviewed and agree with the findings and plan as documented in his note unless described otherwise below. Pt presents to the ER with leg pain, concern for possible DVT, d dimer positive (was ordered for dvt eval, patient has no cp/sob at any point, vss), however US neg for dvt. No gross signs of infection, patient well appearing, stable for dc home, counseled regarding need for f/u and Er return precautions. Electronically signed by Rosibel Santos MD on 5/12/22 at 11:43 PM EDT         Antwon Moran MD  05/12/22 0701      I personally saw and examined the patient. I have reviewed and agree with the Resident findings, including all diagnostic interpretations and treatment plans as written unless described otherwise above. I was present for the key portion of any procedures performed and the inclusive time noted in any critical care statement.      Antwon Moran MD  06/19/22 [id number]

## 2022-05-09 NOTE — TELEPHONE ENCOUNTER
He is to see Dr. Sharyle King on Wednesday. Ask if he is using ace wrap or compression hose, elevation to help with edema?

## 2022-05-10 ENCOUNTER — TELEPHONE (OUTPATIENT)
Dept: INTERNAL MEDICINE CLINIC | Age: 52
End: 2022-05-10

## 2022-05-11 NOTE — TELEPHONE ENCOUNTER
Patient states that he is wrapping his leg and elevating then but it is not really helping . Patient has appointment today with Dr. Maverick Childs and a follow-up on 6/7 here in the office. Canceled doppler .

## 2022-05-12 ASSESSMENT — ENCOUNTER SYMPTOMS
RESPIRATORY NEGATIVE: 1
GASTROINTESTINAL NEGATIVE: 1
ALLERGIC/IMMUNOLOGIC NEGATIVE: 1

## 2022-05-13 ENCOUNTER — HOSPITAL ENCOUNTER (OUTPATIENT)
Age: 52
Discharge: HOME | End: 2022-05-13
Payer: MEDICARE

## 2022-05-13 ENCOUNTER — HOSPITAL ENCOUNTER (OUTPATIENT)
Dept: GENERAL RADIOLOGY | Age: 52
Discharge: HOME | End: 2022-05-13
Payer: MEDICARE

## 2022-05-13 DIAGNOSIS — L97.514 ULCER OF RIGHT FOOT, WITH NECROSIS OF BONE (HCC): ICD-10-CM

## 2022-05-13 LAB
ANION GAP SERPL CALCULATED.3IONS-SCNC: 10 MEQ/L (ref 8–16)
BACTERIA: ABNORMAL
BILIRUBIN URINE: NEGATIVE
BLOOD, URINE: NEGATIVE
BUN BLDV-MCNC: 11 MG/DL (ref 7–22)
CALCIUM SERPL-MCNC: 8.8 MG/DL (ref 8.5–10.5)
CASTS: ABNORMAL /LPF
CASTS: ABNORMAL /LPF
CHARACTER, URINE: CLEAR
CHLORIDE BLD-SCNC: 104 MEQ/L (ref 98–111)
CO2: 29 MEQ/L (ref 23–33)
COLOR: YELLOW
CREAT SERPL-MCNC: 0.6 MG/DL (ref 0.4–1.2)
CRYSTALS: ABNORMAL
EKG ATRIAL RATE: 76 BPM
EKG P-R INTERVAL: 192 MS
EKG Q-T INTERVAL: 378 MS
EKG QRS DURATION: 90 MS
EKG QTC CALCULATION (BAZETT): 425 MS
EKG R AXIS: -178 DEGREES
EKG T AXIS: 176 DEGREES
EKG VENTRICULAR RATE: 76 BPM
EPITHELIAL CELLS, UA: ABNORMAL /HPF
ERYTHROCYTE [DISTWIDTH] IN BLOOD BY AUTOMATED COUNT: 12.8 % (ref 11.5–14.5)
ERYTHROCYTE [DISTWIDTH] IN BLOOD BY AUTOMATED COUNT: 39.3 FL (ref 35–45)
GFR SERPL CREATININE-BSD FRML MDRD: > 90 ML/MIN/1.73M2
GLUCOSE BLD-MCNC: 245 MG/DL (ref 70–108)
GLUCOSE, URINE: >= 1000 MG/DL
HCT VFR BLD CALC: 40.9 % (ref 42–52)
HEMOGLOBIN: 14.1 GM/DL (ref 14–18)
KETONES, URINE: ABNORMAL
LEUKOCYTE EST, POC: NEGATIVE
MCH RBC QN AUTO: 29.8 PG (ref 26–33)
MCHC RBC AUTO-ENTMCNC: 34.5 GM/DL (ref 32.2–35.5)
MCV RBC AUTO: 86.5 FL (ref 80–94)
MISCELLANEOUS LAB TEST RESULT: ABNORMAL
NITRITE, URINE: NEGATIVE
PH UA: 6 (ref 5–9)
PLATELET # BLD: 278 THOU/MM3 (ref 130–400)
PMV BLD AUTO: 9.3 FL (ref 9.4–12.4)
POTASSIUM SERPL-SCNC: 4.1 MEQ/L (ref 3.5–5.2)
PROTEIN UA: 30 MG/DL
RBC # BLD: 4.73 MILL/MM3 (ref 4.7–6.1)
RBC URINE: ABNORMAL /HPF
RENAL EPITHELIAL, UA: ABNORMAL
SODIUM BLD-SCNC: 143 MEQ/L (ref 135–145)
SPECIFIC GRAVITY UA: > 1.03 (ref 1–1.03)
UROBILINOGEN, URINE: 1 EU/DL (ref 0–1)
WBC # BLD: 7.6 THOU/MM3 (ref 4.8–10.8)
WBC UA: ABNORMAL /HPF
YEAST: ABNORMAL

## 2022-05-13 PROCEDURE — 36415 COLL VENOUS BLD VENIPUNCTURE: CPT

## 2022-05-13 PROCEDURE — 93010 ELECTROCARDIOGRAM REPORT: CPT | Performed by: NUCLEAR MEDICINE

## 2022-05-13 PROCEDURE — 71046 X-RAY EXAM CHEST 2 VIEWS: CPT

## 2022-05-13 PROCEDURE — 80048 BASIC METABOLIC PNL TOTAL CA: CPT

## 2022-05-13 PROCEDURE — 85027 COMPLETE CBC AUTOMATED: CPT

## 2022-05-13 PROCEDURE — 81001 URINALYSIS AUTO W/SCOPE: CPT

## 2022-05-13 PROCEDURE — 93005 ELECTROCARDIOGRAM TRACING: CPT | Performed by: PODIATRIST

## 2022-05-16 ENCOUNTER — OFFICE VISIT (OUTPATIENT)
Dept: INTERNAL MEDICINE CLINIC | Age: 52
End: 2022-05-16
Payer: MEDICARE

## 2022-05-16 VITALS
BODY MASS INDEX: 34.67 KG/M2 | DIASTOLIC BLOOD PRESSURE: 78 MMHG | HEIGHT: 74 IN | HEART RATE: 72 BPM | TEMPERATURE: 99.1 F | SYSTOLIC BLOOD PRESSURE: 132 MMHG

## 2022-05-16 DIAGNOSIS — R41.0 EPISODIC CONFUSION: ICD-10-CM

## 2022-05-16 DIAGNOSIS — Z86.718 HISTORY OF RECURRENT DEEP VEIN THROMBOSIS: ICD-10-CM

## 2022-05-16 DIAGNOSIS — L97.514 ULCER OF RIGHT FOOT, WITH NECROSIS OF BONE (HCC): ICD-10-CM

## 2022-05-16 DIAGNOSIS — Z01.818 PRE-OP EXAM: Primary | ICD-10-CM

## 2022-05-16 DIAGNOSIS — Z79.4 TYPE 2 DIABETES MELLITUS WITH DIABETIC NEUROPATHY, WITH LONG-TERM CURRENT USE OF INSULIN (HCC): ICD-10-CM

## 2022-05-16 DIAGNOSIS — G47.33 OSA (OBSTRUCTIVE SLEEP APNEA): ICD-10-CM

## 2022-05-16 DIAGNOSIS — R21 RASH: ICD-10-CM

## 2022-05-16 DIAGNOSIS — D49.7 FOLLICULAR NEOPLASM OF THYROID: ICD-10-CM

## 2022-05-16 DIAGNOSIS — R00.0 SINUS TACHYCARDIA: ICD-10-CM

## 2022-05-16 DIAGNOSIS — E11.40 TYPE 2 DIABETES MELLITUS WITH DIABETIC NEUROPATHY, WITH LONG-TERM CURRENT USE OF INSULIN (HCC): ICD-10-CM

## 2022-05-16 DIAGNOSIS — G40.209 PARTIAL SYMPTOMATIC EPILEPSY WITH COMPLEX PARTIAL SEIZURES, NOT INTRACTABLE, WITHOUT STATUS EPILEPTICUS (HCC): ICD-10-CM

## 2022-05-16 DIAGNOSIS — M14.672 CHARCOT'S JOINT OF ANKLE, LEFT: ICD-10-CM

## 2022-05-16 DIAGNOSIS — E78.2 MIXED HYPERLIPIDEMIA: ICD-10-CM

## 2022-05-16 DIAGNOSIS — Z98.890 STATUS POST UVULOPALATOPHARYNGOPLASTY: ICD-10-CM

## 2022-05-16 DIAGNOSIS — K21.9 GASTROESOPHAGEAL REFLUX DISEASE WITHOUT ESOPHAGITIS: ICD-10-CM

## 2022-05-16 DIAGNOSIS — M24.572 EQUINUS CONTRACTURE OF LEFT ANKLE: ICD-10-CM

## 2022-05-16 DIAGNOSIS — E66.9 OBESITY (BMI 30.0-34.9): ICD-10-CM

## 2022-05-16 DIAGNOSIS — E89.0 STATUS POST PARTIAL THYROIDECTOMY: ICD-10-CM

## 2022-05-16 PROCEDURE — G8427 DOCREV CUR MEDS BY ELIG CLIN: HCPCS | Performed by: INTERNAL MEDICINE

## 2022-05-16 PROCEDURE — 99214 OFFICE O/P EST MOD 30 MIN: CPT | Performed by: INTERNAL MEDICINE

## 2022-05-16 PROCEDURE — G8417 CALC BMI ABV UP PARAM F/U: HCPCS | Performed by: INTERNAL MEDICINE

## 2022-05-16 PROCEDURE — 93000 ELECTROCARDIOGRAM COMPLETE: CPT | Performed by: INTERNAL MEDICINE

## 2022-05-16 PROCEDURE — 2022F DILAT RTA XM EVC RTNOPTHY: CPT | Performed by: INTERNAL MEDICINE

## 2022-05-16 RX ORDER — DIAPER,BRIEF,INFANT-TODD,DISP
EACH MISCELLANEOUS
Qty: 30 G | Refills: 1 | Status: SHIPPED | OUTPATIENT
Start: 2022-05-16 | End: 2022-05-23

## 2022-05-16 RX ORDER — PRENATAL VIT 91/IRON/FOLIC/DHA 28-975-200
COMBINATION PACKAGE (EA) ORAL
Qty: 1 EACH | Refills: 1 | Status: SHIPPED | OUTPATIENT
Start: 2022-05-16 | End: 2022-06-07

## 2022-05-16 NOTE — PROGRESS NOTES
epidural anesthesia planned per wife - may want to hold Pradaxa longer if planning spinal or epidural surgery. Recent visit to ED 5/9/22 for left leg pain - negative venous doppler of left LE, wraps in ACE wrap to help with edema and trying to elevate. He was given Norco for pain. Seizure disorder - continue current seizure medication on time, do not hold for surgery. Follows with neurologist in [place] now. No longer on Primidone for resting tremor in arm. Reactions to anesthesia: mental status change after thyroid surgery - thought to be due to seizures, elevated ammonia level (Depokote has since been discontinued). History of excessive bleeding: none    History of blood clots: yes - DVT, last one 12/29/21 right. History of blood transfusions: FFP when on Coumadin and difficult to get levels at goal.      Reactions to blood transfusion: none     Past Medical History:   Diagnosis Date    Abnormal thyroid biopsy     s/p left hemithyroidectomy 3/6565 - follicular adenoma    Acid reflux     Anemia     resolved - previously seen by Dr. Jose Maria Nelson (due to enlarged spleen)    Anesthesia     seizures with brain surgery due to blood sugar got really high    Bile reflux gastritis     per EGD 11/16/21 - Dr. Arnold Gutierrez - to start Carafate.     Bipolar disorder ([facility]     Blood clot in vein 04/07/2016    [facility] Blood     Cancer [facility]) 04/2019    thyroid    Chest pain     previously seeing [facility] cardiologist, now seeing Dr. Dario Ruano (LAD bridging on cath 4/2016)    Chronic back pain     Chronic bronchitis ([facility]     Dr. Caitlin Wellington Colon polyp 08/30/2016    Depression     seeing Humaira Mcgarry DVT (deep venous thrombosis) ([facility] [id number]    not on Coumadin due to unable to regulate - Dr. Kimberly Beaulieu - no etiology found per patient    Esophageal abnormality     nodule     Fatty liver disease, nonalcoholic     U/S 47/8227 [facility]    Furuncle     legs    History of Doppler ultrasound 05/29/2011    No hemodynamically significant carotid stenosis is identified. A thyroid nodule on each side. Dedicated ultrasound of thyroid gland is suggested to further evaluate if clinically indicated.  History of pulmonary embolism 2007    s/p GFF, related to knee surgery    Hx of blood clots     leg and lung    Hyperlipidemia     severely elevated triglycerides    Hypertension     diastolic    Intracranial arachnoid cyst 11/2012    Dr. Angelique Ocampo drained, complicated with seizures, DKA    Irritable bowel syndrome     Liver disease     Thereasa Bruins - elevated LFT - positive smooth muscle antibody, steatosis per liver bx 3/2014 with Dr. Shelly Chauhan (multiple drug resistant organisms) resistance 2012    MRSA (methicillin resistant staph aureus) culture positive     h/o in foot and before brain surgery    Nephrolithiasis     noted on CT abdomen 9/2016, 6/2019    Neuropathy     Pancreatic insufficiency     Positive SANDY (antinuclear antibody)     Dr. Doretha Adams - first visit in 6/2013    Prolonged emergence from general anesthesia     Restless legs syndrome     Seizures ([address]     started with brain surgery    Sinus tachycardia     Holter 3/2013 - seeing Dr. Breonna Perez fracture [facility])     clips     Sleep apnea     positive sleep apnea per study 10/2020.     Systolic dysfunction     \"systolic bridge\"  EF 55% ECHO 9/2019    Type II or unspecified type diabetes mellitus without mention of complication, not stated as uncontrolled 2007       Past Surgical History:   Procedure Laterality Date    CARDIAC CATHETERIZATION      2011,5-6 years ago   [address]  3-2012   [address]  04/28/2016    [address] [facility]     CARPAL TUNNEL RELEASE  01/2017    CHOLECYSTECTOMY  2004    COLONOSCOPY  2012    COLONOSCOPY  08/2016    2 tubular adenomas - reportedly needs repeat scope in 6 months    CRANIOTOMY  11/2012    arachnoid cyst drainage    EKG 12-LEAD  10/18/2015         ENDOSCOPY, COLON, DIAGNOSTIC      HERNIA REPAIR Right 1996    [facility]--Dr. Emil Nix    INGUINAL HERNIA REPAIR Right 09/15/2016    Robotic assisted    KNEE ARTHROSCOPY Right 2016    KNEE SURGERY Left 2007    acl and debrided twice    OTHER SURGICAL HISTORY  5-31-11    Tilt table was associated w/ nonspecific symptoms or nausea, otherwise no significant dizziness or syncope. No significant hemodynamic changes. Otherwise, unremarkable tilt table test after 30 minutes of tilting.  OTHER SURGICAL HISTORY  01/20/2017    RIGHT SHOULDER ARTHROSCOPY, OPEN STAN, OPEN ACROMIOPLASTY, BICEP TENDESIS, RIGHT CARPAL TUNNEL RELEASE    SHOULDER SURGERY Right 01/2007    THYROID LOBECTOMY N/A 1/15/2021    THYROID LOBECTOMY, UVULOPALATOPHARYNGOPLAST LAOP performed by Alfie Gerardo MD at [address] ECHOCARDIOGRAM  3-05-11    LV size and systolic function normal. EF 55-65%.  UPPER GASTROINTESTINAL ENDOSCOPY  2012    UPPER GASTROINTESTINAL ENDOSCOPY  2016    Dr. Yissel Nogueira Left 10/22/2019    EGD DILATION SAVORY performed by Mago Suarez MD at [facility] ENDOSCOPY Left 10/22/2019    EGD BIOPSY performed by Mago Suarez MD at [facility] N/A 11/16/2021    EGD performed by Mago Suarez MD at [facility] ENDOSCOPY Left 11/16/2021    EGD BIOPSY performed by Mago Suarez MD at [address]  2007       Current Outpatient Medications   Medication Sig Dispense Refill    terbinafine (GNP TERBINAFINE HYDROCHLORIDE) 1 % cream Apply topically 2 times daily. 1 each 1    hydrocortisone (ALA-SOTERO) 1 % cream Apply topically 2 times daily. 30 g 1    HYDROcodone-acetaminophen (NORCO) 5-325 MG per tablet TAKE 1 TABLET BY MOUTH TWO TIMES A DAY AS NEEDED FOR 7 DAYS      gabapentin (NEURONTIN) 600 MG tablet Take 600 mg by mouth 4 times daily.       vitamin D (ERGOCALCIFEROL) 1.25 MG (46770 UT) CAPS capsule Take 1 capsule by mouth once a week 12 capsule 1    promethazine (PHENERGAN) 12.5 MG tablet Take 1-2 tablets by mouth every 8 hours as needed for Nausea 60 tablet 1    metoprolol tartrate (LOPRESSOR) 25 MG tablet Take 1 tablet by mouth 2 times daily 180 tablet 1    lacosamide (VIMPAT) 200 MG tablet Take 1 tablet by mouth 2 times daily for 90 days. 180 tablet 2    LORazepam (ATIVAN) 0.5 MG tablet Lorazepam Active 0.5 MG PO as needed 20 tablet 0    blood glucose test strips (ONETOUCH ULTRA) strip 1 each by In Vitro route 5 times daily DX: E11.65 Dispense Onetouch Ultra 2 test strips 450 each 3    dabigatran (PRADAXA) 150 MG capsule Take 1 capsule by mouth 2 times daily 60 capsule 0    sucralfate (CARAFATE) 1 GM tablet Take 1 g by mouth 4 times daily      rOPINIRole (REQUIP) 1 MG tablet TAKE 1 TABLET BY MOUTH THREE TIMES A  tablet 1    ARIPiprazole (ABILIFY) 5 MG tablet Take 10 mg by mouth daily       vitamin B-12 (CYANOCOBALAMIN) 1000 MCG tablet Take 1,000 mcg by mouth 2 times daily       insulin glargine (BASAGLAR KWIKPEN) 100 UNIT/ML injection pen Inject 60 Units into the skin 2 times daily       DULoxetine (CYMBALTA) 60 MG extended release capsule Take 120 mg by mouth daily      levETIRAcetam (KEPPRA) 1000 MG tablet Take 2 tablets by mouth 2 times daily 60 tablet 3    insulin lispro (HUMALOG) 100 UNIT/ML injection vial Takes 25 units with meals plus group 2 sliding scale      pantoprazole (PROTONIX) 40 MG tablet Take 1 tablet by mouth 2 times daily 180 tablet 1    sildenafil (VIAGRA) 100 MG tablet Take 1 tablet by mouth as needed for Erectile Dysfunction 45 tablet 3     No current facility-administered medications for this visit.        Allergies   Allergen Reactions    Ciprofloxacin-Ciproflox Hcl Er Other (See Comments)     Elevated creatinine    Heparin      Monitor for thrombocytopenia    Metformin Nausea Only     Abdominal pain, diarrhea    Niacin And Related Other (See Comments)     Burning sensation, severe flushing    Tramadol Hcl      Contraindication to seizure medications    Ultram [Tramadol]      Contraindication to seizure medications    Warfarin      Very difficult to regulate in past    Other      Other reaction(s): very difficult to regulate       Social History     Socioeconomic History    Marital status:      Spouse name: Not on file    Number of children: 3    Years of education: Not on file    Highest education level: Not on file   Occupational History    Occupation: Disability since 2011   Tobacco Use    Smoking status: Never Smoker    Smokeless tobacco: Never Used   Vaping Use    Vaping Use: Never used   Substance and Sexual Activity    Alcohol use: No     Alcohol/week: 0.0 standard drinks    Drug use: No    Sexual activity: Not on file   Other Topics Concern    Not on file   Social History Narrative    Not on file     Social Determinants of Health     Financial Resource Strain: Medium Risk    Difficulty of Paying Living Expenses: Somewhat hard   Food Insecurity: No Food Insecurity    Worried About Running Out of Food in the Last Year: Never true    Jeffry of Food in the Last Year: Never true   Transportation Needs:     Lack of Transportation (Medical): Not on file    Lack of Transportation (Non-Medical):  Not on file   Physical Activity:     Days of Exercise per Week: Not on file    Minutes of Exercise per Session: Not on file   Stress:     Feeling of Stress : Not on file   Social Connections:     Frequency of Communication with Friends and Family: Not on file    Frequency of Social Gatherings with Friends and Family: Not on file    Attends Oriental orthodox Services: Not on file    Active Member of Clubs or Organizations: Not on file    Attends Club or Organization Meetings: Not on file    Marital Status: Not on file   Intimate Partner Violence:     Fear of Current or Ex-Partner: Not on file   [employer] Abused: Not on file    Physically Abused: Not on file    Sexually Abused: Not on file   Housing Stability:     Unable to Pay for Housing in the Last Year: Not on file    Number of Places Lived in the Last Year: Not on file    Unstable Housing in the Last Year: Not on file       Family History   Problem Relation Age of Onset    Heart Disease Father 61        MI    Stroke Brother     Obesity Brother     Arthritis Mother     Seizures Mother     Obesity Sister     Other Brother         pulmonary embolism    Diabetes Daughter     Seizures Brother        Review of Systems - General ROS: negative for - chills or fever  Psychological ROS: negative for - anxiety and depression - stable  Hematological and Lymphatic ROS: No history of blood clots or bleeding disorder. Respiratory ROS: no cough, shortness of breath, or wheezing  Cardiovascular ROS: no chest pain or dyspnea on exertion, limited activity  Gastrointestinal ROS: no abdominal pain, change in bowel habits, or black or bloody stools  Genito-Urinary ROS: no dysuria, trouble voiding, or hematuria  Musculoskeletal ROS: negative for - muscle pain or muscular weakness, bilateral foot pain  Neurological ROS: negative for - headaches, or weakness - last seizure 5/10/22. Dermatological ROS: negative for - skin lesion changes, rash on left foot. Blood pressure 132/78, pulse 72, temperature 99.1 °F (37.3 °C), height 6' 2\" (1.88 m).     Physical Examination: General appearance -alert, well appearing, and in no distress  Mental status - alert, oriented to person, place, and time  Head - atraumatic, normocephalic  Eyes - pupils equal and reactive to light, extraocular muscles intact  Ears - external ears are normal, hearing is grossly normal  Mouth - oral pharynx clear, mucous membranes moist, poor dentition  Neck - supple, no significant adenopathy  Chest - clear to auscultation, no wheezes, rales or rhonchi, symmetric air entry  Heart - normal rate, regular rhythm, normal S1, S2, no murmurs, rubs, clicks or gallops  Abdomen -soft, nontender, nondistended  Neurological - alert, oriented, cranial nerves II-XII intact, motor and sensation are grossly intact bilateral upper extremities. Did not test LE due to in bilateral boots in wheelchair  Extremities - peripheral pulses normal in left PT and DP pulses, no clubbing or cyanosis. Positive edema in left foot and ankle  Skin - warm and dry, vesicular erythematous rash of left foot medial, lateral and top of foot. Diagnostic data:   I have reviewed recent diagnostic testing including labs (CBC, BMP, and UA), EKG, CXR 5/13/22 and repeat EKG in office today, venous doppler left LE 5/9/22. Assessment and Plan:    Patient is an acceptable surgical candidate for planned procedure pending evaluation of left foot rash - defer to Dr. Almita Galindo on appearance today and day of surgery. May be able to proceed on right foot surgery but defer left foot surgery to discretion of surgeon due to rash. Cardiac risk assessment:  Patient has intermediate clinical predictors of cardiac risk and tolerates less than 4 mets of activity. Patient is scheduled for an elective intermediate risk surgery and is considered at an acceptable cardiac risk based on ACC/AHA criteria - normal stress test 12/29/20 and no change in symptoms since test.  His EKG abnormalities on EKG from 5/13/22 were likely due to lead placement as I repeated EKG in office today and normal.  No further work up needed. Diagnosis Orders   1. Pre-op exam  EKG 12 Lead   2. Ulcer of right foot, with necrosis of bone (HCC)     3. Charcot's joint of ankle, left     4. Equinus contracture of left ankle     5. Type 2 diabetes mellitus with diabetic neuropathy, with long-term current use of insulin ([address]     6. History of recurrent deep vein thrombosis     7. Partial symptomatic epilepsy with complex partial seizures, not intractable, without status epilepticus ([address]     8. Episodic confusion     9. Rash  terbinafine (GNP TERBINAFINE HYDROCHLORIDE) 1 % cream    hydrocortisone (ALA-SOTERO) 1 % cream   10. Obesity (BMI 30.0-34.9)     11. MISTI (obstructive sleep apnea)     12. Status post uvulopalatopharyngoplasty     13. Sinus tachycardia     14. Mixed hyperlipidemia     15. Gastroesophageal reflux disease without esophagitis     16. Follicular neoplasm of thyroid     17. Status post partial thyroidectomy       Hold Pradaxa 2 days prior to surgery, if planning spinal anesthesia/epidural anesthesia - may need to hold longer. DM - HgA1c 7.7, not ideal but acceptable for surgery. Stressed the importance of eating 3 small meals a day instead of one big meal, he has started trying Glucerna powder drink for breakfast (20 gm protein) to help with this. He is working with GI to help with chronic nausea/abdominal discomfort/fatty liver issues, see planned work up above. He is to call if seeing consistent elevations in glucose and will adjust insulin. He will do this on his own from time to time if needed. Encouraged tight control now and several months after surgery to aid healing. Decrease Basaglar to 30 Units the night prior to surgery and none the morning of surgery. History of multiple DVTs/PE - have been provoked. Will continue chronic Pradaxa for now - hold 2 days prior to surgery. No excessive bleeding or CVA symptoms. Continued edema in legs - still suggest compression and elevation above level of heart when able. Seizure disorder/episodic confusion - follows with neurologist in [place] now - Dr. Corey Lombardo. He has been on maximal medications for years with breakthrough seizures. He has been able to come off Depakote. Still having seizures periodically but not more than typical for him. Recommend continuing seizure medication as scheduled, no holding for surgery. Seizure precautions in hospital, up with assistance post-op if able to transfer.   Avoid medications that may lower seizure threshold. If having increased confusion - this may be indication of seizure in this patient/also check ammonia level). Continue Neurontin 600 mg QID, Zonisamide 600 mg nightly, Vimpat 200 mg BID, and Keppra 2 g BID. He has Ativan to use prn if feels seizure coming on. Rash left foot - unsure if could be tinea pedis as this can present like this or dermatitis - will treat with combo steroid and antifungal cream BID and see if improves. Obesity - BMI 34.67 - recommend weight loss with better diet but unable to exercise with foot issues. MISTI/s/p UPPP - he has never been able to tolerate CPAP, so with thyroid surgery, also did UPPP. He has not been tested with sleep study since surgery to see if still has issues with sleep apnea. Monitor oxygen saturation closely post op. Sinus tachycardia/mild hypertension - treated with metoprolol and controlled, continue up to and including the am of surgery if take in am.    Hyperlipidemia - previously on fenofibrate and Lipitor but stopped after hospitalization for thyroid surgery/post-op confusion. Will address post-op. GERD - unchanged, intermittent nausea - continue Protonix 40 mg BID and carafate QID. Thyroid cancer/ s/p partial thyroidectomy - TSH normal 7/2021. DVT prophylaxis/history of DVT/PE - restart Pradaxa as soon as possible post-op, when ok with surgeon. Keep follow up visit 6/7/22 to continue to work on DM control, discuss lipid meds, TSH and follow up after thyroid surgery - never saw Dr. Joseph Hutson/? Follow up with ENT. Allergies: Ciprofloxacin-ciproflox hcl er, Heparin, Metformin, Niacin and related, Tramadol hcl, Ultram [tramadol], Warfarin, and Other     [place] MD Mate[address]  [address]  Dept: [phone]  Dept Fax: [phone] : [phone]

## 2022-05-18 ENCOUNTER — TELEPHONE (OUTPATIENT)
Dept: INTERNAL MEDICINE CLINIC | Age: 52
End: 2022-05-18

## 2022-05-18 PROBLEM — G47.33 OBSTRUCTIVE SLEEP APNEA: Status: RESOLVED | Noted: 2020-10-20 | Resolved: 2022-05-18

## 2022-05-18 NOTE — TELEPHONE ENCOUNTER
Hector called back this morning with update from Dr. Nir Carias visit . Dr. Nora Burroughs was okay to proceed with surgery , rash will not interfere .  Dr. Nora Burroughs stared Hector on an antibiotic and her is to continue the cortisone cream.

## 2022-05-19 ENCOUNTER — CARE COORDINATION (OUTPATIENT)
Dept: CARE COORDINATION | Age: 52
End: 2022-05-19

## 2022-05-19 NOTE — CARE COORDINATION
I was able to fax in the patients information for assistance on Pradaxa to [facility] for review.       [facility]   Medication Assistance  [address] [facility], and [employer]    J) [phone] (W) [phone]

## 2022-05-22 DIAGNOSIS — D34 THYROID FOLLICULAR ADENOMA: ICD-10-CM

## 2022-05-22 DIAGNOSIS — E55.9 VITAMIN D DEFICIENCY: ICD-10-CM

## 2022-05-22 DIAGNOSIS — E11.40 TYPE 2 DIABETES MELLITUS WITH DIABETIC NEUROPATHY, WITH LONG-TERM CURRENT USE OF INSULIN (HCC): Primary | ICD-10-CM

## 2022-05-22 DIAGNOSIS — Z79.4 TYPE 2 DIABETES MELLITUS WITH DIABETIC NEUROPATHY, WITH LONG-TERM CURRENT USE OF INSULIN (HCC): Primary | ICD-10-CM

## 2022-05-22 DIAGNOSIS — E78.2 MIXED HYPERLIPIDEMIA: ICD-10-CM

## 2022-05-22 DIAGNOSIS — D49.7 FOLLICULAR NEOPLASM OF THYROID: ICD-10-CM

## 2022-05-22 PROBLEM — G93.41 METABOLIC ENCEPHALOPATHY: Status: RESOLVED | Noted: 2021-12-16 | Resolved: 2022-05-22

## 2022-05-22 PROBLEM — L97.516 ULCER OF RIGHT FOOT WITH BONE INVOLVEMENT WITHOUT EVIDENCE OF NECROSIS (HCC): Status: ACTIVE | Noted: 2022-05-22

## 2022-05-22 PROBLEM — G57.53 TARSAL TUNNEL SYNDROME OF BOTH LOWER EXTREMITIES: Status: ACTIVE | Noted: 2022-05-22

## 2022-05-22 PROBLEM — F44.4 FUNCTIONAL NEUROLOGICAL SYMPTOM DISORDER WITH ABNORMAL MOVEMENT: Status: RESOLVED | Noted: 2021-04-30 | Resolved: 2022-05-22

## 2022-05-22 PROBLEM — S93.601A SPRAIN OF RIGHT FOOT: Status: RESOLVED | Noted: 2020-09-21 | Resolved: 2022-05-22

## 2022-05-22 PROBLEM — S91.309A OPEN WOUND OF HEEL: Status: RESOLVED | Noted: 2021-12-16 | Resolved: 2022-05-22

## 2022-05-22 PROBLEM — I82.551 CHRONIC DEEP VEIN THROMBOSIS (DVT) OF RIGHT PERONEAL VEIN (HCC): Status: ACTIVE | Noted: 2022-05-22

## 2022-05-22 PROBLEM — M14.672 CHARCOT'S JOINT OF LEFT FOOT: Status: ACTIVE | Noted: 2022-05-22

## 2022-05-22 PROBLEM — R74.02 ELEVATED LEVELS OF TRANSAMINASE & LACTIC ACID DEHYDROGENASE: Status: RESOLVED | Noted: 2020-09-21 | Resolved: 2022-05-22

## 2022-05-22 PROBLEM — R56.9 SEIZURE (HCC): Status: RESOLVED | Noted: 2018-10-16 | Resolved: 2022-05-22

## 2022-05-22 PROBLEM — R74.01 ELEVATED LEVELS OF TRANSAMINASE & LACTIC ACID DEHYDROGENASE: Status: RESOLVED | Noted: 2020-09-21 | Resolved: 2022-05-22

## 2022-05-22 PROBLEM — I82.451 ACUTE DEEP VEIN THROMBOSIS (DVT) OF RIGHT PERONEAL VEIN (HCC): Status: RESOLVED | Noted: 2022-03-19 | Resolved: 2022-05-22

## 2022-06-01 ENCOUNTER — CARE COORDINATION (OUTPATIENT)
Dept: CARE COORDINATION | Age: 52
End: 2022-06-01

## 2022-06-01 NOTE — CARE COORDINATION
I was returning Hector's call and he stated he got denied through [facility] because he needs to apply for Medicaid. He stated he did this and got denied, I told him once he gets that denial letter to call me and I will submit that to [facility].         [address] [place] [address]   Medication Assistance  [address] [place] [address], and [employer]    C) [phone] (V) [phone]

## 2022-06-07 ENCOUNTER — OFFICE VISIT (OUTPATIENT)
Dept: INTERNAL MEDICINE CLINIC | Age: 52
End: 2022-06-07
Payer: MEDICARE

## 2022-06-07 VITALS
TEMPERATURE: 97.2 F | BODY MASS INDEX: 34.67 KG/M2 | DIASTOLIC BLOOD PRESSURE: 80 MMHG | HEIGHT: 74 IN | HEART RATE: 92 BPM | SYSTOLIC BLOOD PRESSURE: 134 MMHG

## 2022-06-07 DIAGNOSIS — Z86.718 HISTORY OF DVT (DEEP VEIN THROMBOSIS): ICD-10-CM

## 2022-06-07 DIAGNOSIS — E66.9 OBESITY (BMI 30.0-34.9): ICD-10-CM

## 2022-06-07 DIAGNOSIS — E11.40 TYPE 2 DIABETES MELLITUS WITH DIABETIC NEUROPATHY, WITH LONG-TERM CURRENT USE OF INSULIN (HCC): Primary | ICD-10-CM

## 2022-06-07 DIAGNOSIS — R11.2 NON-INTRACTABLE VOMITING WITH NAUSEA, UNSPECIFIED VOMITING TYPE: ICD-10-CM

## 2022-06-07 DIAGNOSIS — Z79.4 TYPE 2 DIABETES MELLITUS WITH DIABETIC NEUROPATHY, WITH LONG-TERM CURRENT USE OF INSULIN (HCC): Primary | ICD-10-CM

## 2022-06-07 DIAGNOSIS — G25.81 RESTLESS LEGS SYNDROME (RLS): ICD-10-CM

## 2022-06-07 DIAGNOSIS — E78.2 MIXED HYPERLIPIDEMIA: ICD-10-CM

## 2022-06-07 LAB — HBA1C MFR BLD: 7.7 % (ref 4.3–5.7)

## 2022-06-07 PROCEDURE — G8417 CALC BMI ABV UP PARAM F/U: HCPCS | Performed by: INTERNAL MEDICINE

## 2022-06-07 PROCEDURE — G8427 DOCREV CUR MEDS BY ELIG CLIN: HCPCS | Performed by: INTERNAL MEDICINE

## 2022-06-07 PROCEDURE — 83036 HEMOGLOBIN GLYCOSYLATED A1C: CPT | Performed by: INTERNAL MEDICINE

## 2022-06-07 PROCEDURE — 3051F HG A1C>EQUAL 7.0%<8.0%: CPT | Performed by: INTERNAL MEDICINE

## 2022-06-07 PROCEDURE — 1036F TOBACCO NON-USER: CPT | Performed by: INTERNAL MEDICINE

## 2022-06-07 PROCEDURE — 3017F COLORECTAL CA SCREEN DOC REV: CPT | Performed by: INTERNAL MEDICINE

## 2022-06-07 PROCEDURE — 99214 OFFICE O/P EST MOD 30 MIN: CPT | Performed by: INTERNAL MEDICINE

## 2022-06-07 PROCEDURE — 2022F DILAT RTA XM EVC RTNOPTHY: CPT | Performed by: INTERNAL MEDICINE

## 2022-06-07 RX ORDER — PROMETHAZINE HYDROCHLORIDE 12.5 MG/1
12.5-25 TABLET ORAL EVERY 8 HOURS PRN
Qty: 60 TABLET | Refills: 1 | Status: CANCELLED | OUTPATIENT
Start: 2022-06-07

## 2022-06-07 NOTE — PROGRESS NOTES
1970    Chief Complaint   Patient presents with    Diabetes      surgery 5/20 [facility] foot ulcer       Pt is a 46 y.o. male who presents for a 3 weeks follow up visit. At last visit did a preoperative medical consultation at the request of Dr. MARCIA MOTA prior to right foot metatarsal phalangeal joint resection and placement of antibiotic beads, left foot Achilles tendon lengthening. He had surgery. He is now 3 weeks post op. Per his and wife's report -incision on left looks good, right foot incision not as good. Left foot in cast, right foot in walking boot. He is in wheelchair to keep weight off feet. Seeing Dr. MARCIA MOTA every week. Rash on left foot - seen at pre-op visit. Started medially then progressed to lateral and top of foot - vesicular rash with erythematous base. Unable to tell me if painful or itching due to neuropathy. He saw Dr. MARCIA MOTA and was given a steroid cream.  Rash is better with steroid cream.     DM - HgA1c 7.7 2/21/22 and 6/7/22 - FSBS 210 this am, 121 last night. He didn't bring glucose numbers or glucometer. Stressed importance of glucose control to be able to heal and prevent infection after surgery. Tried to encourage small meals during day instead of one big one. He was drinking Glucerna in am.  Now fell off in doing that - states he is \"just lazy\". Will try to get CGM and consider insulin pump. Set up DM clinic. Basaglar 60 Units BID - short acting insulin 25-45 Units at meals. In past, he was told he was a 1.5 type DM. Suggested he see Endocrinology but he doesn't want to see Dr. Silvio Harrington and too difficult to go out of town currently. Most recent eye exam: 6/2/20 - Dr. Andrew Astorga. Minimal/mild DM retinopathy. Will get eye exam when other medical issues more stable. Reminded him to get this done. Renal function - normal - eGFR >90 5/13/22. Microalbumin/creatinine ratio: 59 on 12/29/2020. Reminded him to get done now.   Positive neuropathy, severe - no foot exam due to post -op  LDL - 70.86 4/6/21 - Lipid panel due now. Checking FSBS BID typically, but some days 1-4x a day. GI - did EGD 11/2021 (bile gastritis, suspected gastroparesis, cirrhosis, portal hypertensive gastropathy), suggested colonoscopy, liver bx and gastric emptying study. Fibroscan 10/2021 -fatty liver, no fibrosis per wife. He is off Depakote since 12/2021 which should help since EGD. He postponed colonoscopy today. DVT - history of DVT's, last one was on the right LE 12/2021 but we have continued Pradaxa due to history of multiple DVT now and limited mobility due to infections of lower extremities - hold Pradaxa 2 days prior to surgery (normal kidney function). No plan of spinal or epidural anesthesia planned per wife - may want to hold Pradaxa longer if planning spinal or epidural surgery. Recent visit to ED 5/9/22 for left leg pain - negative venous doppler of left LE, wraps in ACE wrap to help with edema and trying to elevate. He was given Norco for pain. Off Pradaxa < 2 weeks due to cost - he was told he couldn't get from company as could qualify for Medicaid but applied and Medicaid denied. It has been 6 months since DVT. Seizure disorder - continue current seizure medication on time. Follows with neurologist in [place] now. No longer on Primidone for resting tremor in arm. 2 seizures in 5/2022, which is good for him. Restless leg syndrome - stable on Requip. Past Medical History:   Diagnosis Date    Abnormal thyroid biopsy     s/p left hemithyroidectomy 5/2758 -benign follicular adenoma    Acid reflux     Anemia     resolved - previously seen by Dr. Antonio Tobin (due to enlarged spleen)    Anesthesia     seizures with brain surgery due to blood sugar got really high    Bile reflux gastritis     per EGD 11/16/21 - Dr. Sonido Mackey - to start Carafate.     Bipolar disorder ([facility] [address]     Blood clot in [facility]07/2016    [facility]     Chest pain     previously seeing [facility] cardiologist, now seeing Dr. Landy Mcnair (LAD bridging on cath 4/2016)    Chronic back pain     Chronic bronchitis ([address]     Dr. Ioana Celestin Colon polyp 08/30/2016    Depression     seeing Parish Callejas DVT (deep venous thrombosis) ([facility] [address] [id number]    not on Coumadin due to unable to regulate - Dr. Juan Mendosa - no etiology found per patient    Esophageal abnormality     nodule     Fatty liver disease, nonalcoholic     U/S 92/4779 [facility]    Follicular adenoma of thyroid gland 01/15/2021    s/p lobectomy    Furuncle     legs    History of Doppler ultrasound 05/29/2011    No hemodynamically significant carotid stenosis is identified. A thyroid nodule on each side. Dedicated ultrasound of thyroid gland is suggested to further evaluate if clinically indicated.  History of pulmonary embolism 2007    s/p GFF, related to knee surgery    Hx of blood clots     leg and lung    Hyperlipidemia     severely elevated triglycerides    Hypertension     diastolic    Intracranial arachnoid cyst 11/2012    Dr. Moshe Pickens drained, complicated with seizures, DKA    Irritable bowel syndrome     Liver disease     [address] - elevated LFT - positive smooth muscle antibody, steatosis per liver bx 3/2014 with Dr. Melissa Louise (multiple drug resistant organisms) resistance 2012    MRSA (methicillin resistant staph aureus) culture positive     h/o in foot and before brain surgery    Nephrolithiasis     noted on CT abdomen 9/2016, 6/2019    Neuropathy     Pancreatic insufficiency     Positive SANDY (antinuclear antibody)     Dr. Neri Edyd - first visit in 6/2013    Prolonged emergence from general anesthesia     Restless legs syndrome     Seizures ([facility] [address]     started with brain surgery    Sinus tachycardia     Holter 3/2013 - seeing Dr. Faizan mclaughlin [facility])     clips     Sleep apnea     positive sleep apnea per study 10/2020.     Systolic dysfunction     \"systolic bridge\"  EF 84% ECHO 9/2019    Type II or unspecified type diabetes mellitus without mention of complication, not stated as uncontrolled 2007       Past Surgical History:   Procedure Laterality Date    CARDIAC CATHETERIZATION      2011,5-6 years ago   [address]  03/2012    CARDIAC CATHETERIZATION  04/28/2016    [facility]     CARPAL TUNNEL RELEASE  01/2017    CHOLECYSTECTOMY  2004    COLONOSCOPY  2012    COLONOSCOPY  08/2016    2 tubular adenomas - reportedly needs repeat scope in 6 months    CRANIOTOMY  11/2012    arachnoid cyst drainage    EKG 12-LEAD  10/18/2015         ENDOSCOPY, COLON, DIAGNOSTIC      FOOT SURGERY  05/20/2022    right foot metatarsal phalangeal joint resection and placement of antibiotic beads, left foot Achilles tendon lengthening    HERNIA REPAIR Right 1996    [facility]--Dr. Amy Ramos    INGUINAL HERNIA REPAIR Right 09/15/2016    Robotic assisted    KNEE ARTHROSCOPY Right 2016    KNEE SURGERY Left 2007    acl and debrided twice    OTHER SURGICAL HISTORY  05/31/2011    Tilt table was associated w/ nonspecific symptoms or nausea, otherwise no significant dizziness or syncope. No significant hemodynamic changes. Otherwise, unremarkable tilt table test after 30 minutes of tilting.  OTHER SURGICAL HISTORY  01/20/2017    RIGHT SHOULDER ARTHROSCOPY, OPEN STAN, OPEN ACROMIOPLASTY, BICEP TENDESIS, RIGHT CARPAL TUNNEL RELEASE    SHOULDER SURGERY Right 01/2007    THYROID LOBECTOMY N/A 01/15/2021    THYROID LOBECTOMY, UVULOPALATOPHARYNGOPLAST LAOP performed by Meche Ricketts MD at [facility] ECHOCARDIOGRAM  03/05/2011    LV size and systolic function normal. EF 55-65%.      UPPER GASTROINTESTINAL ENDOSCOPY  2012    UPPER GASTROINTESTINAL ENDOSCOPY  2016    Dr. Nora Marina Left 10/22/2019    EGD DILATION SAVORY performed by Rajinder Garner MD at [facility] ENDOSCOPY Left 10/22/2019    EGD BIOPSY performed by Rajinder Garner MD at [facility]    UPPER GASTROINTESTINAL ENDOSCOPY N/A 11/16/2021    EGD performed by Michelle Holley MD at [address] Left 11/16/2021    EGD BIOPSY performed by Michelle Holley MD at [address]  2007       Current Outpatient Medications   Medication Sig Dispense Refill    rOPINIRole (REQUIP) 1 MG tablet Take 1 tablet by mouth 3 times daily 270 tablet 1    Insulin Pen Needle 32G X 4 MM MISC Use 5 times daily with insulin pens 450 each 3    vitamin D (ERGOCALCIFEROL) 1.25 MG (34027 UT) CAPS capsule Take 1 capsule by mouth once a week 12 capsule 1    metoprolol tartrate (LOPRESSOR) 25 MG tablet Take 1 tablet by mouth 2 times daily 180 tablet 1    lacosamide (VIMPAT) 200 MG tablet Take 1 tablet by mouth 2 times daily for 90 days.  180 tablet 2    LORazepam (ATIVAN) 0.5 MG tablet Lorazepam Active 0.5 MG PO as needed 20 tablet 0    blood glucose test strips (ONETOUCH ULTRA) strip 1 each by In Vitro route 5 times daily DX: E11.65 Dispense Onetouch Ultra 2 test strips 450 each 3    sucralfate (CARAFATE) 1 GM tablet Take 1 g by mouth 4 times daily      vitamin B-12 (CYANOCOBALAMIN) 1000 MCG tablet Take 1,000 mcg by mouth 2 times daily       insulin glargine (BASAGLAR KWIKPEN) 100 UNIT/ML injection pen Inject 60 Units into the skin 2 times daily       DULoxetine (CYMBALTA) 60 MG extended release capsule Take 120 mg by mouth daily      levETIRAcetam (KEPPRA) 1000 MG tablet Take 2 tablets by mouth 2 times daily (Patient taking differently: Take 1,000 mg by mouth 2 times daily ) 60 tablet 3    insulin lispro (HUMALOG) 100 UNIT/ML injection vial Takes 25 units with meals plus group 2 sliding scale      pantoprazole (PROTONIX) 40 MG tablet Take 1 tablet by mouth 2 times daily 180 tablet 1    mirtazapine (REMERON) 15 MG tablet Take 15 mg by mouth nightly      ARIPiprazole (ABILIFY) 10 MG tablet Take 10 mg by mouth nightly      gabapentin (NEURONTIN) 600 MG tablet Take 1 tablet by mouth 4 times daily for 90 days. 120 tablet 2    promethazine (PHENERGAN) 12.5 MG tablet Take 1-2 tablets by mouth every 8 hours as needed for Nausea 60 tablet 1    aspirin 81 MG chewable tablet Take 1 tablet by mouth daily 30 tablet 0     No current facility-administered medications for this visit. Allergies   Allergen Reactions    Ciprofloxacin-Ciproflox Hcl Er Other (See Comments)     Elevated creatinine    Heparin      Monitor for thrombocytopenia    Metformin Nausea Only     Abdominal pain, diarrhea    Niacin And Related Other (See Comments)     Burning sensation, severe flushing    Tramadol Hcl      Contraindication to seizure medications    Ultram [Tramadol]      Contraindication to seizure medications    Warfarin      Very difficult to regulate in past    Other      Other reaction(s): very difficult to regulate       Social History     Socioeconomic History    Marital status:      Spouse name: Not on file    Number of children: 3    Years of education: Not on file    Highest education level: Not on file   Occupational History    Occupation: Disability since 2011   Tobacco Use    Smoking status: Never Smoker    Smokeless tobacco: Never Used   Vaping Use    Vaping Use: Never used   Substance and Sexual Activity    Alcohol use: No     Alcohol/week: 0.0 standard drinks    Drug use: No    Sexual activity: Not on file   Other Topics Concern    Not on file   Social History Narrative    Not on file     Social Determinants of Health     Financial Resource Strain: Medium Risk    Difficulty of Paying Living Expenses: Somewhat hard   Food Insecurity: No Food Insecurity    Worried About Running Out of Food in the Last Year: Never true    Jeffry of Food in the Last Year: Never true   Transportation Needs:     Lack of Transportation (Medical): Not on file    Lack of Transportation (Non-Medical):  Not on file   Physical Activity:     Days of Exercise per Week: Not on file    Minutes of Exercise per Session: Not on file   Stress:     Feeling of Stress : Not on file   Social Connections:     Frequency of Communication with Friends and Family: Not on file    Frequency of Social Gatherings with Friends and Family: Not on file    Attends Quaker Services: Not on file    Active Member of [address] [place] [address] or Organizations: Not on file    Attends Club or Organization Meetings: Not on file    Marital Status: Not on file   Intimate Partner Violence:     Fear of Current or Ex-Partner: Not on file    Emotionally Abused: Not on file    Physically Abused: Not on file    Sexually Abused: Not on file   Housing Stability:     Unable to Pay for Housing in the Last Year: Not on file    Number of [place] in the Last Year: Not on file    Unstable Housing in the Last Year: Not on file       Review of Systems - General ROS: negative for - chills or fever  Psychological ROS: negative for - anxiety and depression - stable  Respiratory ROS: no cough, shortness of breath, or wheezing  Cardiovascular ROS: no chest pain or dyspnea on exertion, limited activity  Gastrointestinal ROS: no abdominal pain, change in bowel habits, or black or bloody stools  Genito-Urinary ROS: no dysuria, trouble voiding, or hematuria  Musculoskeletal ROS: negative for - muscle pain or muscular weakness, bilateral foot pain  Neurological ROS: negative for - headaches, or weakness -chronic intermittent seizures   Dermatological ROS: negative for - skin lesion changes, rash on left foot improving. Blood pressure 134/80, pulse 92, temperature 97.2 °F (36.2 °C), height 6' 2\" (1.88 m).     Physical Examination: General appearance -alert, well appearing, and in no distress  Mouth - oral pharynx clear, mucous membranes moist, poor dentition  Neck - supple, no significant adenopathy  Chest - clear to auscultation, no wheezes, rales or rhonchi, symmetric air entry  Heart - normal rate, regular rhythm, normal S1, S2, no murmurs, rubs, clicks or gallops  Abdomen -soft, nontender, nondistended  Neurological - alert, normal speech  Extremities - no clubbing or cyanosis. Skin - warm and dry    Diagnostic data:   I have reviewed recent diagnostic testing including labs- 5/20 MRSA screen negative, tissue culture no growth, pathology - right proximal phalanx and second metatarsal bone fragments with no active inflammation. Multiple labs ordered 5/22/22 - he did not get done prior to visit as ordered - reminded him to do so. Assessment and Plan:      Diagnosis Orders   1. Type 2 diabetes mellitus with diabetic neuropathy, with long-term current use of insulin ([facility])  POCT glycosylated hemoglobin (Hb A1C)    74733 - Collection Capillary Blood Specimen   2. History of DVT (deep vein thrombosis)     3. Restless legs syndrome (RLS)  rOPINIRole (REQUIP) 1 MG tablet   4. Mixed hyperlipidemia     5. Obesity (BMI 30.0-34.9)     6. Non-intractable vomiting with nausea, unspecified vomiting type       DM - HgA1c 7.7 today 6/2022, not controlled but due to dietary non-compliance, complicated by GI issues, on Basaglar and Humalog as above - will continue. Stressed the importance of eating 3 small meals a day instead of one big meal, he needs to get back into habit of Glucerna powder drink for breakfast (20 gm protein) to help with this. He is working with GI to help with chronic nausea/abdominal discomfort/fatty liver issues, see planned work up above. He is to call if seeing consistent elevations in glucose and will adjust insulin. He will do this on his own from time to time if needed. Encouraged tight control now and several months after surgery to aid healing. Need to look into CGM with possibility of insulin pump in future. Will set up with DM clinic for education and further management - refused to see local endocrinologist.      History of multiple DVTs/PE - have been provoked.   He was on Pradaxa with no excessive bleeding or CVA symptoms. Continued edema in legs - still suggest compression and elevation above level of heart when able. He is now stating Pradaxa too expensive, can't get through company and can't qualify for Medicaid, unable to control Coumadin with previous attempt reportedly with Dr. Sj Young (I was not his MD at that time). Patient doesn't want to continue medication as too expensive and I don't see alternative to get it. He is aware that he is at high risk for another clot due to immobility with infected foot, recent surgery. Restless leg syndrome - stable, on Requip, will continue. Hyperlipidemia - previously on fenofibrate and Lipitor but stopped after hospitalization for thyroid surgery/post-op confusion. Due for lipid panel now. Obesity - BMI 34.67 - recommend weight loss with better diet but unable to exercise with foot issues. Chronic N/V/GERD - unchanged, intermittent nausea - continue Protonix 40 mg BID and carafate QID. He has prn Phenergan. Continue to encourage regular follow up with GI which he doesn't do consistently. Benign follicular adenoma/ s/p partial left thyroidectomy 1/2022 - TSH normal 7/2021. Due for TSH now. Seizure disorder/episodic confusion - follows with neurologist in [place] now - Dr. Radha Lozada. He has been on maximal medications for years with breakthrough seizures. He has been able to come off Depakote. Still having seizures periodically but not more than typical for him. He is on Neurontin 600 mg QID, Zonisamide 600 mg nightly, Vimpat 200 mg BID, and Keppra 2 g BID. He has Ativan to use prn if feels seizure coming on. Plan per neurology. Rash left foot - unsure if could be tinea pedis as this can present like this or dermatitis - will treat with combo steroid and antifungal cream BID and see if improves. Sinus tachycardia/mild hypertension - treated with metoprolol and controlled, continue.     Follow up visit 6 weeks to continue to work on DM control, discuss lipid meds, etc.      Allergies: Ciprofloxacin-ciproflox hcl er, Heparin, Metformin, Niacin and related, Tramadol hcl, Ultram [tramadol], Warfarin, and Other     Selvin Hanson MD    [address] [place] [address] W.  [facility] Jonathan Gandhi  Dept: [phone]  Dept Fax: 65 : [phone]

## 2022-06-13 ENCOUNTER — APPOINTMENT (OUTPATIENT)
Dept: GENERAL RADIOLOGY | Age: 52
DRG: 287 | End: 2022-06-13
Payer: MEDICARE

## 2022-06-13 ENCOUNTER — TELEPHONE (OUTPATIENT)
Dept: CARDIOLOGY CLINIC | Age: 52
End: 2022-06-13

## 2022-06-13 ENCOUNTER — APPOINTMENT (OUTPATIENT)
Dept: INTERVENTIONAL RADIOLOGY/VASCULAR | Age: 52
DRG: 287 | End: 2022-06-13
Payer: MEDICARE

## 2022-06-13 ENCOUNTER — HOSPITAL ENCOUNTER (INPATIENT)
Age: 52
LOS: 1 days | Discharge: HOME | DRG: 287 | End: 2022-06-14
Attending: EMERGENCY MEDICINE | Admitting: FAMILY MEDICINE
Payer: MEDICARE

## 2022-06-13 ENCOUNTER — APPOINTMENT (OUTPATIENT)
Dept: CT IMAGING | Age: 52
DRG: 287 | End: 2022-06-13
Payer: MEDICARE

## 2022-06-13 DIAGNOSIS — R07.9 CHEST PAIN, UNSPECIFIED TYPE: Primary | ICD-10-CM

## 2022-06-13 DIAGNOSIS — Z86.718 HISTORY OF DVT (DEEP VEIN THROMBOSIS): ICD-10-CM

## 2022-06-13 DIAGNOSIS — M79.89 SWELLING OF LEFT FOOT: ICD-10-CM

## 2022-06-13 LAB
ALBUMIN SERPL-MCNC: 4.1 G/DL (ref 3.5–5.1)
ALP BLD-CCNC: 162 U/L (ref 38–126)
ALT SERPL-CCNC: 17 U/L (ref 11–66)
ANION GAP SERPL CALCULATED.3IONS-SCNC: 9 MEQ/L (ref 8–16)
AST SERPL-CCNC: 13 U/L (ref 5–40)
BASOPHILS # BLD: 0.2 %
BASOPHILS ABSOLUTE: 0 THOU/MM3 (ref 0–0.1)
BILIRUB SERPL-MCNC: 0.7 MG/DL (ref 0.3–1.2)
BUN BLDV-MCNC: 7 MG/DL (ref 7–22)
CALCIUM SERPL-MCNC: 9.2 MG/DL (ref 8.5–10.5)
CHLORIDE BLD-SCNC: 98 MEQ/L (ref 98–111)
CO2: 29 MEQ/L (ref 23–33)
CREAT SERPL-MCNC: 0.6 MG/DL (ref 0.4–1.2)
EKG ATRIAL RATE: 90 BPM
EKG P AXIS: 44 DEGREES
EKG P-R INTERVAL: 170 MS
EKG Q-T INTERVAL: 344 MS
EKG QRS DURATION: 80 MS
EKG QTC CALCULATION (BAZETT): 420 MS
EKG R AXIS: 38 DEGREES
EKG T AXIS: 36 DEGREES
EKG VENTRICULAR RATE: 90 BPM
EOSINOPHIL # BLD: 7.2 %
EOSINOPHILS ABSOLUTE: 0.7 THOU/MM3 (ref 0–0.4)
ERYTHROCYTE [DISTWIDTH] IN BLOOD BY AUTOMATED COUNT: 12.8 % (ref 11.5–14.5)
ERYTHROCYTE [DISTWIDTH] IN BLOOD BY AUTOMATED COUNT: 36.7 FL (ref 35–45)
GFR SERPL CREATININE-BSD FRML MDRD: > 90 ML/MIN/1.73M2
GLUCOSE BLD-MCNC: 172 MG/DL (ref 70–108)
GLUCOSE BLD-MCNC: 225 MG/DL (ref 70–108)
HCT VFR BLD CALC: 39 % (ref 42–52)
HEMOGLOBIN: 14 GM/DL (ref 14–18)
IMMATURE GRANS (ABS): 0.1 THOU/MM3 (ref 0–0.07)
IMMATURE GRANULOCYTES: 1.1 %
LYMPHOCYTES # BLD: 20.2 %
LYMPHOCYTES ABSOLUTE: 1.9 THOU/MM3 (ref 1–4.8)
MAGNESIUM: 1.4 MG/DL (ref 1.6–2.4)
MCH RBC QN AUTO: 29.1 PG (ref 26–33)
MCHC RBC AUTO-ENTMCNC: 35.9 GM/DL (ref 32.2–35.5)
MCV RBC AUTO: 81.1 FL (ref 80–94)
MONOCYTES # BLD: 7.2 %
MONOCYTES ABSOLUTE: 0.7 THOU/MM3 (ref 0.4–1.3)
NUCLEATED RED BLOOD CELLS: 0 /100 WBC
OSMOLALITY CALCULATION: 277 MOSMOL/KG (ref 275–300)
PHOSPHORUS: 3.9 MG/DL (ref 2.4–4.7)
PLATELET # BLD: 239 THOU/MM3 (ref 130–400)
PMV BLD AUTO: 10.1 FL (ref 9.4–12.4)
POTASSIUM REFLEX MAGNESIUM: 3.3 MEQ/L (ref 3.5–5.2)
PRO-BNP: 147.3 PG/ML (ref 0–900)
RBC # BLD: 4.81 MILL/MM3 (ref 4.7–6.1)
SEG NEUTROPHILS: 64.1 %
SEGMENTED NEUTROPHILS ABSOLUTE COUNT: 5.9 THOU/MM3 (ref 1.8–7.7)
SODIUM BLD-SCNC: 136 MEQ/L (ref 135–145)
TOTAL PROTEIN: 7.2 G/DL (ref 6.1–8)
TROPONIN T: < 0.01 NG/ML
TROPONIN T: < 0.01 NG/ML
WBC # BLD: 9.2 THOU/MM3 (ref 4.8–10.8)

## 2022-06-13 PROCEDURE — 6360000002 HC RX W HCPCS: Performed by: STUDENT IN AN ORGANIZED HEALTH CARE EDUCATION/TRAINING PROGRAM

## 2022-06-13 PROCEDURE — 2580000003 HC RX 258: Performed by: EMERGENCY MEDICINE

## 2022-06-13 PROCEDURE — 93970 EXTREMITY STUDY: CPT

## 2022-06-13 PROCEDURE — 93010 ELECTROCARDIOGRAM REPORT: CPT | Performed by: NUCLEAR MEDICINE

## 2022-06-13 PROCEDURE — 99285 EMERGENCY DEPT VISIT HI MDM: CPT

## 2022-06-13 PROCEDURE — 71045 X-RAY EXAM CHEST 1 VIEW: CPT

## 2022-06-13 PROCEDURE — 96374 THER/PROPH/DIAG INJ IV PUSH: CPT

## 2022-06-13 PROCEDURE — 2500000003 HC RX 250 WO HCPCS: Performed by: EMERGENCY MEDICINE

## 2022-06-13 PROCEDURE — 71275 CT ANGIOGRAPHY CHEST: CPT

## 2022-06-13 PROCEDURE — 6370000000 HC RX 637 (ALT 250 FOR IP): Performed by: EMERGENCY MEDICINE

## 2022-06-13 PROCEDURE — 2140000000 HC CCU INTERMEDIATE R&B

## 2022-06-13 PROCEDURE — 85025 COMPLETE CBC W/AUTO DIFF WBC: CPT

## 2022-06-13 PROCEDURE — 84100 ASSAY OF PHOSPHORUS: CPT

## 2022-06-13 PROCEDURE — 83880 ASSAY OF NATRIURETIC PEPTIDE: CPT

## 2022-06-13 PROCEDURE — 80053 COMPREHEN METABOLIC PANEL: CPT

## 2022-06-13 PROCEDURE — 2580000003 HC RX 258: Performed by: STUDENT IN AN ORGANIZED HEALTH CARE EDUCATION/TRAINING PROGRAM

## 2022-06-13 PROCEDURE — 93005 ELECTROCARDIOGRAM TRACING: CPT | Performed by: EMERGENCY MEDICINE

## 2022-06-13 PROCEDURE — 36415 COLL VENOUS BLD VENIPUNCTURE: CPT

## 2022-06-13 PROCEDURE — 6360000004 HC RX CONTRAST MEDICATION: Performed by: EMERGENCY MEDICINE

## 2022-06-13 PROCEDURE — 6370000000 HC RX 637 (ALT 250 FOR IP): Performed by: STUDENT IN AN ORGANIZED HEALTH CARE EDUCATION/TRAINING PROGRAM

## 2022-06-13 PROCEDURE — 84484 ASSAY OF TROPONIN QUANT: CPT

## 2022-06-13 PROCEDURE — 1200000003 HC TELEMETRY R&B

## 2022-06-13 PROCEDURE — 83735 ASSAY OF MAGNESIUM: CPT

## 2022-06-13 PROCEDURE — 82948 REAGENT STRIP/BLOOD GLUCOSE: CPT

## 2022-06-13 RX ORDER — INSULIN LISPRO 100 [IU]/ML
0-18 INJECTION, SOLUTION INTRAVENOUS; SUBCUTANEOUS
Status: DISCONTINUED | OUTPATIENT
Start: 2022-06-14 | End: 2022-06-14

## 2022-06-13 RX ORDER — ONDANSETRON 2 MG/ML
4 INJECTION INTRAMUSCULAR; INTRAVENOUS EVERY 6 HOURS PRN
Status: DISCONTINUED | OUTPATIENT
Start: 2022-06-13 | End: 2022-06-15 | Stop reason: HOSPADM

## 2022-06-13 RX ORDER — INSULIN LISPRO 100 [IU]/ML
0-9 INJECTION, SOLUTION INTRAVENOUS; SUBCUTANEOUS NIGHTLY
Status: DISCONTINUED | OUTPATIENT
Start: 2022-06-13 | End: 2022-06-14

## 2022-06-13 RX ORDER — HEPARIN SODIUM AND DEXTROSE 10000; 5 [USP'U]/100ML; G/100ML
5-30 INJECTION INTRAVENOUS CONTINUOUS
Status: DISCONTINUED | OUTPATIENT
Start: 2022-06-13 | End: 2022-06-15 | Stop reason: HOSPADM

## 2022-06-13 RX ORDER — POTASSIUM CHLORIDE 20 MEQ/1
40 TABLET, EXTENDED RELEASE ORAL PRN
Status: DISCONTINUED | OUTPATIENT
Start: 2022-06-13 | End: 2022-06-15 | Stop reason: HOSPADM

## 2022-06-13 RX ORDER — INSULIN GLARGINE 100 [IU]/ML
60 INJECTION, SOLUTION SUBCUTANEOUS 2 TIMES DAILY
Status: DISCONTINUED | OUTPATIENT
Start: 2022-06-13 | End: 2022-06-15 | Stop reason: HOSPADM

## 2022-06-13 RX ORDER — DABIGATRAN ETEXILATE 75 MG/1
150 CAPSULE, COATED PELLETS ORAL 2 TIMES DAILY
Status: DISCONTINUED | OUTPATIENT
Start: 2022-06-13 | End: 2022-06-13

## 2022-06-13 RX ORDER — HEPARIN SODIUM 1000 [USP'U]/ML
4000 INJECTION, SOLUTION INTRAVENOUS; SUBCUTANEOUS PRN
Status: DISCONTINUED | OUTPATIENT
Start: 2022-06-13 | End: 2022-06-15 | Stop reason: HOSPADM

## 2022-06-13 RX ORDER — POTASSIUM CHLORIDE 7.45 MG/ML
10 INJECTION INTRAVENOUS PRN
Status: DISCONTINUED | OUTPATIENT
Start: 2022-06-13 | End: 2022-06-15 | Stop reason: HOSPADM

## 2022-06-13 RX ORDER — LEVETIRACETAM 500 MG/1
2000 TABLET ORAL 2 TIMES DAILY
Status: DISCONTINUED | OUTPATIENT
Start: 2022-06-13 | End: 2022-06-15 | Stop reason: HOSPADM

## 2022-06-13 RX ORDER — ARIPIPRAZOLE 10 MG/1
10 TABLET ORAL DAILY
Status: DISCONTINUED | OUTPATIENT
Start: 2022-06-14 | End: 2022-06-15 | Stop reason: HOSPADM

## 2022-06-13 RX ORDER — CLOPIDOGREL BISULFATE 75 MG/1
75 TABLET ORAL DAILY
Status: DISCONTINUED | OUTPATIENT
Start: 2022-06-13 | End: 2022-06-15 | Stop reason: HOSPADM

## 2022-06-13 RX ORDER — DEXTROSE MONOHYDRATE 50 MG/ML
100 INJECTION, SOLUTION INTRAVENOUS PRN
Status: DISCONTINUED | OUTPATIENT
Start: 2022-06-13 | End: 2022-06-15 | Stop reason: HOSPADM

## 2022-06-13 RX ORDER — INSULIN LISPRO 100 [IU]/ML
25 INJECTION, SOLUTION INTRAVENOUS; SUBCUTANEOUS
Status: DISCONTINUED | OUTPATIENT
Start: 2022-06-14 | End: 2022-06-15 | Stop reason: HOSPADM

## 2022-06-13 RX ORDER — SODIUM CHLORIDE 0.9 % (FLUSH) 0.9 %
10 SYRINGE (ML) INJECTION PRN
Status: DISCONTINUED | OUTPATIENT
Start: 2022-06-13 | End: 2022-06-15 | Stop reason: HOSPADM

## 2022-06-13 RX ORDER — GABAPENTIN 600 MG/1
600 TABLET ORAL 4 TIMES DAILY
Status: DISCONTINUED | OUTPATIENT
Start: 2022-06-13 | End: 2022-06-15 | Stop reason: HOSPADM

## 2022-06-13 RX ORDER — HEPARIN SODIUM 1000 [USP'U]/ML
2000 INJECTION, SOLUTION INTRAVENOUS; SUBCUTANEOUS PRN
Status: DISCONTINUED | OUTPATIENT
Start: 2022-06-13 | End: 2022-06-15 | Stop reason: HOSPADM

## 2022-06-13 RX ORDER — LACOSAMIDE 50 MG/1
200 TABLET ORAL 2 TIMES DAILY
Status: DISCONTINUED | OUTPATIENT
Start: 2022-06-13 | End: 2022-06-15 | Stop reason: HOSPADM

## 2022-06-13 RX ORDER — ROPINIROLE 1 MG/1
1 TABLET, FILM COATED ORAL 3 TIMES DAILY
Status: DISCONTINUED | OUTPATIENT
Start: 2022-06-13 | End: 2022-06-15 | Stop reason: HOSPADM

## 2022-06-13 RX ORDER — LORAZEPAM 0.5 MG/1
0.5 TABLET ORAL EVERY 4 HOURS PRN
Status: DISCONTINUED | OUTPATIENT
Start: 2022-06-13 | End: 2022-06-15 | Stop reason: HOSPADM

## 2022-06-13 RX ORDER — NITROGLYCERIN 20 MG/100ML
5-200 INJECTION INTRAVENOUS CONTINUOUS
Status: DISCONTINUED | OUTPATIENT
Start: 2022-06-13 | End: 2022-06-15 | Stop reason: HOSPADM

## 2022-06-13 RX ORDER — DULOXETIN HYDROCHLORIDE 60 MG/1
120 CAPSULE, DELAYED RELEASE ORAL DAILY
Status: DISCONTINUED | OUTPATIENT
Start: 2022-06-14 | End: 2022-06-15 | Stop reason: HOSPADM

## 2022-06-13 RX ORDER — SODIUM CHLORIDE 0.9 % (FLUSH) 0.9 %
5-40 SYRINGE (ML) INJECTION EVERY 12 HOURS SCHEDULED
Status: DISCONTINUED | OUTPATIENT
Start: 2022-06-13 | End: 2022-06-15 | Stop reason: HOSPADM

## 2022-06-13 RX ORDER — CAPTOPRIL 12.5 MG/1
6.25 TABLET ORAL 2 TIMES DAILY
Status: DISCONTINUED | OUTPATIENT
Start: 2022-06-13 | End: 2022-06-15 | Stop reason: HOSPADM

## 2022-06-13 RX ORDER — HEPARIN SODIUM 1000 [USP'U]/ML
4000 INJECTION, SOLUTION INTRAVENOUS; SUBCUTANEOUS ONCE
Status: COMPLETED | OUTPATIENT
Start: 2022-06-13 | End: 2022-06-13

## 2022-06-13 RX ORDER — ATORVASTATIN CALCIUM 80 MG/1
80 TABLET, FILM COATED ORAL NIGHTLY
Status: DISCONTINUED | OUTPATIENT
Start: 2022-06-13 | End: 2022-06-15 | Stop reason: HOSPADM

## 2022-06-13 RX ORDER — ASPIRIN 81 MG/1
81 TABLET, CHEWABLE ORAL DAILY
Status: DISCONTINUED | OUTPATIENT
Start: 2022-06-14 | End: 2022-06-15 | Stop reason: HOSPADM

## 2022-06-13 RX ORDER — ACETAMINOPHEN 650 MG/1
650 SUPPOSITORY RECTAL EVERY 6 HOURS PRN
Status: DISCONTINUED | OUTPATIENT
Start: 2022-06-13 | End: 2022-06-15 | Stop reason: HOSPADM

## 2022-06-13 RX ORDER — PANTOPRAZOLE SODIUM 40 MG/1
40 TABLET, DELAYED RELEASE ORAL
Status: DISCONTINUED | OUTPATIENT
Start: 2022-06-13 | End: 2022-06-15 | Stop reason: HOSPADM

## 2022-06-13 RX ORDER — ACETAMINOPHEN 325 MG/1
650 TABLET ORAL ONCE
Status: COMPLETED | OUTPATIENT
Start: 2022-06-13 | End: 2022-06-13

## 2022-06-13 RX ORDER — SODIUM CHLORIDE 9 MG/ML
1000 INJECTION, SOLUTION INTRAVENOUS CONTINUOUS
Status: DISCONTINUED | OUTPATIENT
Start: 2022-06-13 | End: 2022-06-15 | Stop reason: HOSPADM

## 2022-06-13 RX ORDER — ACETAMINOPHEN 325 MG/1
650 TABLET ORAL EVERY 6 HOURS PRN
Status: DISCONTINUED | OUTPATIENT
Start: 2022-06-13 | End: 2022-06-15 | Stop reason: HOSPADM

## 2022-06-13 RX ORDER — SUCRALFATE 1 G/1
1 TABLET ORAL 4 TIMES DAILY
Status: DISCONTINUED | OUTPATIENT
Start: 2022-06-13 | End: 2022-06-15 | Stop reason: HOSPADM

## 2022-06-13 RX ORDER — POLYETHYLENE GLYCOL 3350 17 G/17G
17 POWDER, FOR SOLUTION ORAL DAILY PRN
Status: DISCONTINUED | OUTPATIENT
Start: 2022-06-13 | End: 2022-06-15 | Stop reason: HOSPADM

## 2022-06-13 RX ORDER — ASPIRIN 81 MG/1
324 TABLET, CHEWABLE ORAL ONCE
Status: COMPLETED | OUTPATIENT
Start: 2022-06-13 | End: 2022-06-13

## 2022-06-13 RX ORDER — SODIUM CHLORIDE 9 MG/ML
INJECTION, SOLUTION INTRAVENOUS PRN
Status: DISCONTINUED | OUTPATIENT
Start: 2022-06-13 | End: 2022-06-15 | Stop reason: HOSPADM

## 2022-06-13 RX ORDER — MAGNESIUM SULFATE IN WATER 40 MG/ML
2000 INJECTION, SOLUTION INTRAVENOUS ONCE
Status: COMPLETED | OUTPATIENT
Start: 2022-06-13 | End: 2022-06-14

## 2022-06-13 RX ORDER — ONDANSETRON 4 MG/1
4 TABLET, ORALLY DISINTEGRATING ORAL EVERY 8 HOURS PRN
Status: DISCONTINUED | OUTPATIENT
Start: 2022-06-13 | End: 2022-06-15 | Stop reason: HOSPADM

## 2022-06-13 RX ADMIN — HEPARIN SODIUM 4000 UNITS: 1000 INJECTION INTRAVENOUS; SUBCUTANEOUS at 23:56

## 2022-06-13 RX ADMIN — POTASSIUM CHLORIDE 40 MEQ: 1500 TABLET, EXTENDED RELEASE ORAL at 23:36

## 2022-06-13 RX ADMIN — HEPARIN SODIUM 7.7 UNITS/KG/HR: 5000 INJECTION INTRAVENOUS; SUBCUTANEOUS at 23:55

## 2022-06-13 RX ADMIN — MAGNESIUM SULFATE HEPTAHYDRATE 2000 MG: 40 INJECTION, SOLUTION INTRAVENOUS at 23:02

## 2022-06-13 RX ADMIN — IOPAMIDOL 80 ML: 755 INJECTION, SOLUTION INTRAVENOUS at 19:21

## 2022-06-13 RX ADMIN — SODIUM CHLORIDE 1000 ML: 9 INJECTION, SOLUTION INTRAVENOUS at 23:01

## 2022-06-13 RX ADMIN — NITROGLYCERIN 20 MCG/MIN: 20 INJECTION INTRAVENOUS at 17:23

## 2022-06-13 RX ADMIN — ACETAMINOPHEN 650 MG: 325 TABLET ORAL at 17:18

## 2022-06-13 RX ADMIN — ASPIRIN 324 MG: 81 TABLET, CHEWABLE ORAL at 17:19

## 2022-06-13 RX ADMIN — LEVETIRACETAM 2000 MG: 500 TABLET, FILM COATED ORAL at 23:36

## 2022-06-13 ASSESSMENT — ENCOUNTER SYMPTOMS
BLOOD IN STOOL: 0
RHINORRHEA: 0
COUGH: 0
SHORTNESS OF BREATH: 0
VOMITING: 1
SORE THROAT: 0
DIARRHEA: 0
WHEEZING: 0
NAUSEA: 1
ABDOMINAL PAIN: 0

## 2022-06-13 ASSESSMENT — PAIN SCALES - GENERAL
PAINLEVEL_OUTOF10: 5
PAINLEVEL_OUTOF10: 5
PAINLEVEL_OUTOF10: 4

## 2022-06-13 ASSESSMENT — HEART SCORE: ECG: 0

## 2022-06-13 ASSESSMENT — PAIN DESCRIPTION - PAIN TYPE: TYPE: ACUTE PAIN

## 2022-06-13 ASSESSMENT — PAIN DESCRIPTION - FREQUENCY: FREQUENCY: CONTINUOUS

## 2022-06-13 ASSESSMENT — PAIN DESCRIPTION - LOCATION
LOCATION: CHEST
LOCATION: CHEST

## 2022-06-13 ASSESSMENT — PAIN DESCRIPTION - ONSET: ONSET: ON-GOING

## 2022-06-13 ASSESSMENT — PAIN DESCRIPTION - DESCRIPTORS: DESCRIPTORS: ACHING

## 2022-06-13 ASSESSMENT — PAIN - FUNCTIONAL ASSESSMENT: PAIN_FUNCTIONAL_ASSESSMENT: NONE - DENIES PAIN

## 2022-06-13 NOTE — ED NOTES
Bedside shift report received from [facility]. Pt resting in bed. Respirations even and unlabored. Dr Candice Geiger at bedside to update pt.       Isaias Silver RN  06/13/22 [id number]

## 2022-06-13 NOTE — ED NOTES
ED nurse-to-nurse bedside report    Chief Complaint   Patient presents with    Chest Pain      LOC: alert and orientated to name, place, date  Vital signs   Vitals:    06/13/22 1625 06/13/22 1753 06/13/22 1911   BP: (!) 164/87 136/82 (!) 146/86   Pulse: 92 90 82   Resp: 19 19 15   Temp: 98.3 °F (36.8 °C)     TempSrc: Oral     SpO2: 96% 94% 96%   Weight: 270 lb (122.5 kg)        Pain:    Pain Interventions: See MAR  Pain Goal: 3/10  Oxygen: NA    Current needs required room air   Telemetry: Yes  LDAs:   Peripheral IV 06/13/22 Left Antecubital (Active)   Site Assessment Clean, dry & intact 06/13/22 1911   Line Status Infusing 06/13/22 1911   Phlebitis Assessment No symptoms 06/13/22 1634   Infiltration Assessment 0 06/13/22 1634   Dressing Status New dressing applied;Clean, dry & intact 06/13/22 1634   Dressing Type Transparent 06/13/22 1634   Dressing Intervention New 06/13/22 1634     Continuous Infusions:    sodium chloride      nitroGLYCERIN 20 mcg/min (06/13/22 1723)     Mobility: Requires assistance * 1  Viramontes Fall Risk Score: Fall Risk 7/16/2019   2 or more falls in past year? yes   Fall with injury in past year?  yes     Fall Interventions: Side rails up x2 call light in reach  Report given to: Rachel Strickland RN  06/13/22 1918

## 2022-06-13 NOTE — TELEPHONE ENCOUNTER
Patient is calling to request an appt d/t chest pain. He said he used to see Dr. Woody Mccarty, but then was seeing another provider. Last appt I could find was scheduled with Asher on 05/03/2021, but was cancelled. DOLV was with asher 10/29/2020. I attempted to reach clinical staff at ext [phone], no answer at this time. Please advise.

## 2022-06-13 NOTE — Clinical Note
Discharge Plan[de-identified] Other/[facility])   Telemetry/Cardiac Monitoring Required?: Yes

## 2022-06-13 NOTE — ED NOTES
IV nitro paused until patient returns from vascular studies per Dr. Kiet Arteaga order.       Africa Pathak RN  06/13/22 [id number]

## 2022-06-13 NOTE — ED NOTES
Patient to the ED with complaint of chest pain x3 days. Patient states his chest pain got worse today so he decided to come to the ED. He denies any other complaints. Patient is resting in bed with easy and unlabored respirations. Call light in reach. Side rails up x2. Patient denies further complaints or concerns. Will monitor.         Leah Lazaro RN  06/13/22 0996

## 2022-06-13 NOTE — ED PROVIDER NOTES
[facility] [address] COMPLAINT       Chief Complaint   Patient presents with    Chest Pain       Nurses Notes reviewed and I agree except as noted in the HPI. HISTORY OF PRESENT ILLNESS    Riley Strickland is a 46 y.o. pleasant male who presents emergency department for chest pain. Patient states that he has had an ongoing chest pain for the last 3 days intermittently. Today it seemed worse so he came for evaluation. Reports pain began initially while he was sitting on the couch at home. He had surgery 3 weeks ago, states he had tendon surgery performed in the left foot and on his right foot had a diabetic ulcer and reports that the part of his toe had to be removed due to the ulcer. His stitches were removed last Friday and states he was started on antibiotics by his podiatrist at that time. He describes his chest pain as being left-sided, sharp and intermittent. It lasts anywhere from a few minutes up to 20 minutes. Denies exacerbating or alleviating factors. Reports it occasionally radiates to his left shoulder. He states he has vomiting \"all the time\" for which he takes Phenergan, has had some episodes of vomiting, is unsure whether these are related to his chest pain. He states he has been having sweats. Denies palpitations or syncope. Denies lower extremity pain or swelling. He states he has a history of left lower extremity DVT and also PE in the past.  He reports his most recent left lower extremity DVT was treated with Pradaxa which he stopped 3 weeks ago because his primary care doctor \"felt I had been treated for my clot long enough\". He states he has a Jeimy filter in place due to a past PE. He states he does not have calf pain or swelling and does not feel like he has a DVT currently. He reports he has having chest pain currently. He states he has a history of hypertension, high triglycerides and diabetes.   He reports he had a cath 3 to 4 years ago. He has not had a stent and denies history of known coronary artery disease. No other initial complaints or concerns. REVIEW OF SYSTEMS     Review of Systems   Constitutional: Positive for diaphoresis. Negative for chills and fever. HENT: Negative for congestion, rhinorrhea and sore throat. Eyes: Negative for visual disturbance. Respiratory: Negative for cough, shortness of breath and wheezing. Cardiovascular: Positive for chest pain. Negative for palpitations and leg swelling. Gastrointestinal: Positive for nausea and vomiting. Negative for abdominal pain, blood in stool and diarrhea. Endocrine: Negative for polyuria. Genitourinary: Negative for dysuria. Musculoskeletal: Negative for arthralgias and myalgias. Skin: Negative for rash. Allergic/Immunologic: Negative for immunocompromised state. Neurological: Negative for dizziness, seizures, syncope, speech difficulty, weakness and light-headedness. Hematological: Negative for adenopathy. Psychiatric/Behavioral: Negative for agitation and confusion. The patient is not hyperactive.          PAST MEDICAL HISTORY    has a past medical history of Abnormal thyroid biopsy, Acid reflux, Anemia, Anesthesia, Bile reflux gastritis, Bipolar disorder ([address] Blood clot in vein, Cancer ([address] Chest pain, Chronic back pain, Chronic bronchitis ([address] Colon polyp, Depression, DVT (deep venous thrombosis) ([address] Esophageal abnormality, Fatty liver disease, nonalcoholic, Follicular adenoma of thyroid gland, Furuncle, History of Doppler ultrasound, History of pulmonary embolism, Hx of blood clots, Hyperlipidemia, Hypertension, Intracranial arachnoid cyst, Irritable bowel syndrome, Liver disease, MDRO (multiple drug resistant organisms) resistance, MRSA (methicillin resistant staph aureus) culture positive, Nephrolithiasis, Neuropathy, Pancreatic insufficiency, Positive SANDY (antinuclear antibody), Prolonged emergence from general anesthesia, Restless legs syndrome, Seizures ([facility] [address] Sinus tachycardia, Skull fracture ([facility] [address] Sleep apnea, Systolic dysfunction, and Type II or unspecified type diabetes mellitus without mention of complication, not stated as uncontrolled. SURGICAL HISTORY      has a past surgical history that includes knee surgery (Left, 2007); Vena Cava Filter Placement (2007); hernia repair (Right, 1996); Cholecystectomy (2004); Upper gastrointestinal endoscopy (2012); transthoracic echocardiogram (3-05-11); other surgical history (5-31-11); Cardiac catheterization; Cardiac catheterization (3-2012); craniotomy (11/2012); Tonsillectomy; Endoscopy, colon, diagnostic; EKG 12 Lead (10/18/2015); Knee arthroscopy (Right, 2016); Cardiac catheterization (04/28/2016); Upper gastrointestinal endoscopy (2016); Inguinal hernia repair (Right, 09/15/2016); Colonoscopy (2012); Colonoscopy (08/2016); other surgical history (01/20/2017); shoulder surgery (Right, 01/2007); Carpal tunnel release (01/2017); Upper gastrointestinal endoscopy (Left, 10/22/2019); Upper gastrointestinal endoscopy (Left, 10/22/2019); Thyroid lobectomy (N/A, 1/15/2021); Upper gastrointestinal endoscopy (N/A, 11/16/2021); and Upper gastrointestinal endoscopy (Left, 11/16/2021). CURRENT MEDICATIONS       Previous Medications    ARIPIPRAZOLE (ABILIFY) 5 MG TABLET    Take 10 mg by mouth daily     BLOOD GLUCOSE TEST STRIPS (ONETOUCH ULTRA) STRIP    1 each by In Vitro route 5 times daily DX: E11.65 Dispense Onetouch Ultra 2 test strips    DABIGATRAN (PRADAXA) 150 MG CAPSULE    Take 1 capsule by mouth 2 times daily    DULOXETINE (CYMBALTA) 60 MG EXTENDED RELEASE CAPSULE    Take 120 mg by mouth daily    GABAPENTIN (NEURONTIN) 600 MG TABLET    Take 600 mg by mouth 4 times daily.     INSULIN GLARGINE (BASAGLAR KWIKPEN) 100 UNIT/ML INJECTION PEN    Inject 60 Units into the skin 2 times daily     INSULIN LISPRO (HUMALOG) 100 UNIT/ML INJECTION VIAL    Takes 25 units with meals plus group 2 sliding scale    INSULIN PEN NEEDLE 32G X 4 MM MISC    Use 5 times daily with insulin pens    LACOSAMIDE (VIMPAT) 200 MG TABLET    Take 1 tablet by mouth 2 times daily for 90 days. LEVETIRACETAM (KEPPRA) 1000 MG TABLET    Take 2 tablets by mouth 2 times daily    LORAZEPAM (ATIVAN) 0.5 MG TABLET    Lorazepam Active 0.5 MG PO as needed    METOPROLOL TARTRATE (LOPRESSOR) 25 MG TABLET    Take 1 tablet by mouth 2 times daily    PANTOPRAZOLE (PROTONIX) 40 MG TABLET    Take 1 tablet by mouth 2 times daily    PROMETHAZINE (PHENERGAN) 12.5 MG TABLET    Take 1-2 tablets by mouth every 8 hours as needed for Nausea    ROPINIROLE (REQUIP) 1 MG TABLET    TAKE 1 TABLET BY MOUTH THREE TIMES A DAY    SILDENAFIL (VIAGRA) 100 MG TABLET    Take 1 tablet by mouth as needed for Erectile Dysfunction    SUCRALFATE (CARAFATE) 1 GM TABLET    Take 1 g by mouth 4 times daily    VITAMIN B-12 (CYANOCOBALAMIN) 1000 MCG TABLET    Take 1,000 mcg by mouth 2 times daily     VITAMIN D (ERGOCALCIFEROL) 1.25 MG (21703 UT) CAPS CAPSULE    Take 1 capsule by mouth once a week       ALLERGIES     is allergic to ciprofloxacin-ciproflox hcl er, heparin, metformin, niacin and related, tramadol hcl, ultram [tramadol], warfarin, and other. FAMILY HISTORY     He indicated that his mother is alive. He indicated that his father is . He indicated that his sister is alive. He indicated that three of his five brothers are alive. He indicated that the status of his daughter is unknown.   family history includes Arthritis in his mother; Diabetes in his daughter; Heart Disease (age of onset: 61) in his father; Obesity in his brother and sister; Other in his brother; Seizures in his brother and mother; Stroke in his brother. SOCIAL HISTORY      reports that he has never smoked. He has never used smokeless tobacco. He reports that he does not drink alcohol and does not use drugs.     PHYSICAL EXAM     INITIAL VITALS:  weight is 270 lb (122.5 kg). His oral temperature is 98.3 °F (36.8 °C). His blood pressure is 135/88 and his pulse is 81. His respiration is 17 and oxygen saturation is 95%. CONSTITUTIONAL: [Awake, alert, non toxic, well developed, well nourished, no acute distress]  HEAD: [Normocephalic, atraumatic]  EYES: [Pupils equal, round & reactive to light, extraocular movements intact, no nystagmus, clear conjunctiva, non-icteric sclera]  ENT: [External ear canal clear without evidence of cerumen impaction or foreign body, TM's clear without erythema or bulging. Nares patent without drainage, septum appears midline. Moist mucus membranes, oropharynx clear without exudate, erythema, or mass. Uvula midline]  NECK: [Nontender and supple. No meningismus, no appreciated lymphadenopathy. Intact full range of motion. C-spine midline without vertebral tenderness. Trachea midline.]  CHEST: [Inspection normal, no lesions, equal rise. No crepitus or tenderness upon palpation.]  CARDIOVASCULAR: [Regular rate, rhythm, normal S1 and S2. No appreciated murmurs, rubs, or gallops. No pulse deficits appreciated. Intact distal perfusion. JVD not appreciated.]  PULMONARY: [Respiratory distress absent. Respiratory effort normal. Breath sounds clear to auscultation without rhonchi, rales, or wheezing. No accessory muscle use. No stridor]  ABDOMEN: [Inspection normal, without surgical scars. Soft, non-tender, non-distended, with normoactive bowel sounds. No palpable masses, rebound, or guarding]  BACK: [Intact ROM. No midline vertebral tenderness, step off, or crepitus. No CVA tenderness.]  MUSCULOSKELETAL: [Extremities nontender to palpation. No gross deformity or evidence of external trauma. Intact range of motion. Sensation intact. No clubbing, cyanosis, or edema.]  SKIN: [Warm, dry. No jaundice, rash, urticaria, or petechiae]  NEUROLOGIC: [Alert and oriented x 3, GCS 15, normal mentation for age. Moves all four extremities. No gross sensory deficit. Cesar Weaver MD on    06/13/2022 05:22 PM        [] Visualized and interpreted by me   [x] Radiologist's Wet Read Report Reviewed   [] Discussed withRadiologist.    LABS:   Labs Reviewed   CBC WITH AUTO DIFFERENTIAL - Abnormal; Notable for the following components:       Result Value    Hematocrit 39.0 (*)     MCHC 35.9 (*)     Eosinophils Absolute 0.7 (*)     Immature Grans (Abs) 0.10 (*)     All other components within normal limits   COMPREHENSIVE METABOLIC PANEL W/ REFLEX TO MG FOR LOW K - Abnormal; Notable for the following components:    Glucose 225 (*)     Potassium reflex Magnesium 3.3 (*)     Alkaline Phosphatase 162 (*)     All other components within normal limits   MAGNESIUM - Abnormal; Notable for the following components:    Magnesium 1.4 (*)     All other components within normal limits   TROPONIN   BRAIN NATRIURETIC PEPTIDE   ANION GAP   GLOMERULAR FILTRATION RATE, ESTIMATED   OSMOLALITY   TROPONIN   PHOSPHORUS   MAGNESIUM       EMERGENCY DEPARTMENT COURSE:   Vitals:    Vitals:    06/13/22 1753 06/13/22 1911 06/13/22 2042 06/13/22 2230   BP: 136/82 (!) 146/86 (!) 137/91 135/88   Pulse: 90 82 81 81   Resp: 19 15 16 17   Temp:       TempSrc:       SpO2: 94% 96% 96% 95%   Weight:         Records reviewed. Patient had recent office visit with internal medicine on 6/7. Documentation at that time states that due to patient's history of multiple DVT and PE he was supposed to restart Pradaxa as soon as possible postop when this was okay with his surgeon. Patient presents with chest pain, history of multiple DVT and PE, also has history of diabetes, hypertension, hyperlipidemia. Most recent stress test was in 2020. Last cardiac cath was in 2016. Patient reports he does not typically get chest pain. Due to his recent surgery, will need work-up for DVT or PE.       Heart Score     5    HEART Score recommendations:  Score 0 - 3:   <1.7%  risk of MACE over next 6 wks = Outpatient workup  Score 4 - 6: 12-16% risk of MACE over next 6 wks = Admission for observation  Score 7- 10: 50-65% risk of MACE over next 6 wks = Early invasive strategy    MACE: Major Acute Cardiac Event (All Cause Mortality, AMI or revascularization)  Chest Pain in the Emergency Room: A Multicenter Validation of the [address]. [place] BE, Six AJ, Bernardo QUYNH, Nazanin TP, [employer], Nazanin EG, Quinn SH, [facility] [place] [facility]. Crit Pathw Cardiol. 2010 Sep; 9(3): 164-169. \"A prospective validation of the HEART score for chest pain patients at the emergency department. \" Int J Cardiol. 2013 Oct 3;168(3):2153-8. doi: 10.1016/j.ijcard. 2013.01.255. Epub 2013 Mar 7. The results of pertinent diagnostic studies and exam findings were discussed. The patients provisional diagnosis and plan of care were discussed with the patient and present family. The patient and/or present family expressed understanding of the diagnosis and plan. CRITICAL CARE:   None    CONSULTS:  Hospitalist for admission    PROCEDURES:  None    FINAL IMPRESSION      1. Chest pain, unspecified type          DISPOSITION/PLAN   admit    PATIENT REFERRED TO:  No follow-up provider specified. DISCHARGE MEDICATIONS:  New Prescriptions    No medications on file       (Please note that portions of this note were completed with a voice recognition program.  Efforts were made to edit the dictations but occasionally words are mis-transcribed.)    Provider:  I personally performed the services described in the documentation, reviewed and edited the documentation which was dictated, and it accurately records my words and actions.     Ulices Torres MD 6/13/22 10:34 PM                Ulices Torres MD  06/13/22 [id number]

## 2022-06-14 ENCOUNTER — APPOINTMENT (OUTPATIENT)
Dept: CARDIAC CATH/INVASIVE PROCEDURES | Age: 52
DRG: 287 | End: 2022-06-14
Payer: MEDICARE

## 2022-06-14 VITALS
DIASTOLIC BLOOD PRESSURE: 85 MMHG | TEMPERATURE: 98.4 F | SYSTOLIC BLOOD PRESSURE: 141 MMHG | WEIGHT: 284.39 LBS | OXYGEN SATURATION: 93 % | HEART RATE: 75 BPM | BODY MASS INDEX: 36.5 KG/M2 | HEIGHT: 74 IN | RESPIRATION RATE: 18 BRPM

## 2022-06-14 PROBLEM — I82.409 RECURRENT ACUTE DEEP VEIN THROMBOSIS (DVT) OF LOWER EXTREMITY (HCC): Status: ACTIVE | Noted: 2022-05-22

## 2022-06-14 LAB
ANION GAP SERPL CALCULATED.3IONS-SCNC: 11 MEQ/L (ref 8–16)
APTT: 82 SECONDS (ref 22–38)
BUN BLDV-MCNC: 7 MG/DL (ref 7–22)
CALCIUM SERPL-MCNC: 8.1 MG/DL (ref 8.5–10.5)
CHLORIDE BLD-SCNC: 103 MEQ/L (ref 98–111)
CO2: 26 MEQ/L (ref 23–33)
CREAT SERPL-MCNC: 0.6 MG/DL (ref 0.4–1.2)
ERYTHROCYTE [DISTWIDTH] IN BLOOD BY AUTOMATED COUNT: 13 % (ref 11.5–14.5)
ERYTHROCYTE [DISTWIDTH] IN BLOOD BY AUTOMATED COUNT: 13 % (ref 11.5–14.5)
ERYTHROCYTE [DISTWIDTH] IN BLOOD BY AUTOMATED COUNT: 37.2 FL (ref 35–45)
ERYTHROCYTE [DISTWIDTH] IN BLOOD BY AUTOMATED COUNT: 38.6 FL (ref 35–45)
GFR SERPL CREATININE-BSD FRML MDRD: > 90 ML/MIN/1.73M2
GLUCOSE BLD-MCNC: 252 MG/DL (ref 70–108)
GLUCOSE BLD-MCNC: 268 MG/DL (ref 70–108)
GLUCOSE BLD-MCNC: 283 MG/DL (ref 70–108)
GLUCOSE BLD-MCNC: 296 MG/DL (ref 70–108)
GLUCOSE BLD-MCNC: 323 MG/DL (ref 70–108)
HCT VFR BLD CALC: 37.2 % (ref 42–52)
HCT VFR BLD CALC: 38.3 % (ref 42–52)
HEMOGLOBIN: 12.9 GM/DL (ref 14–18)
HEMOGLOBIN: 13.8 GM/DL (ref 14–18)
HEPARIN UNFRACTIONATED: 0.06 U/ML (ref 0.3–0.7)
HEPARIN UNFRACTIONATED: 0.09 U/ML (ref 0.3–0.7)
INR BLD: 1.07 (ref 0.85–1.13)
MAGNESIUM: 1.5 MG/DL (ref 1.6–2.4)
MCH RBC QN AUTO: 28.9 PG (ref 26–33)
MCH RBC QN AUTO: 29.4 PG (ref 26–33)
MCHC RBC AUTO-ENTMCNC: 34.7 GM/DL (ref 32.2–35.5)
MCHC RBC AUTO-ENTMCNC: 36 GM/DL (ref 32.2–35.5)
MCV RBC AUTO: 81.5 FL (ref 80–94)
MCV RBC AUTO: 83.4 FL (ref 80–94)
PLATELET # BLD: 199 THOU/MM3 (ref 130–400)
PLATELET # BLD: 270 THOU/MM3 (ref 130–400)
PMV BLD AUTO: 10.1 FL (ref 9.4–12.4)
PMV BLD AUTO: 10.2 FL (ref 9.4–12.4)
POTASSIUM SERPL-SCNC: 3.5 MEQ/L (ref 3.5–5.2)
RBC # BLD: 4.46 MILL/MM3 (ref 4.7–6.1)
RBC # BLD: 4.7 MILL/MM3 (ref 4.7–6.1)
SODIUM BLD-SCNC: 140 MEQ/L (ref 135–145)
TSH SERPL DL<=0.05 MIU/L-ACNC: 2.73 UIU/ML (ref 0.4–4.2)
WBC # BLD: 6.7 THOU/MM3 (ref 4.8–10.8)
WBC # BLD: 8.9 THOU/MM3 (ref 4.8–10.8)

## 2022-06-14 PROCEDURE — 2500000003 HC RX 250 WO HCPCS

## 2022-06-14 PROCEDURE — 6360000002 HC RX W HCPCS

## 2022-06-14 PROCEDURE — 99223 1ST HOSP IP/OBS HIGH 75: CPT | Performed by: INTERNAL MEDICINE

## 2022-06-14 PROCEDURE — B2111ZZ FLUOROSCOPY OF MULTIPLE CORONARY ARTERIES USING LOW OSMOLAR CONTRAST: ICD-10-PCS | Performed by: INTERNAL MEDICINE

## 2022-06-14 PROCEDURE — C1894 INTRO/SHEATH, NON-LASER: HCPCS

## 2022-06-14 PROCEDURE — 85520 HEPARIN ASSAY: CPT

## 2022-06-14 PROCEDURE — 93458 L HRT ARTERY/VENTRICLE ANGIO: CPT

## 2022-06-14 PROCEDURE — 6360000002 HC RX W HCPCS: Performed by: STUDENT IN AN ORGANIZED HEALTH CARE EDUCATION/TRAINING PROGRAM

## 2022-06-14 PROCEDURE — 85730 THROMBOPLASTIN TIME PARTIAL: CPT

## 2022-06-14 PROCEDURE — 85027 COMPLETE CBC AUTOMATED: CPT

## 2022-06-14 PROCEDURE — 83735 ASSAY OF MAGNESIUM: CPT

## 2022-06-14 PROCEDURE — 4A023N7 MEASUREMENT OF CARDIAC SAMPLING AND PRESSURE, LEFT HEART, PERCUTANEOUS APPROACH: ICD-10-PCS | Performed by: INTERNAL MEDICINE

## 2022-06-14 PROCEDURE — 6370000000 HC RX 637 (ALT 250 FOR IP): Performed by: STUDENT IN AN ORGANIZED HEALTH CARE EDUCATION/TRAINING PROGRAM

## 2022-06-14 PROCEDURE — 84443 ASSAY THYROID STIM HORMONE: CPT

## 2022-06-14 PROCEDURE — 82948 REAGENT STRIP/BLOOD GLUCOSE: CPT

## 2022-06-14 PROCEDURE — 99238 HOSP IP/OBS DSCHRG MGMT 30/<: CPT | Performed by: FAMILY MEDICINE

## 2022-06-14 PROCEDURE — 80048 BASIC METABOLIC PNL TOTAL CA: CPT

## 2022-06-14 PROCEDURE — 6360000004 HC RX CONTRAST MEDICATION: Performed by: FAMILY MEDICINE

## 2022-06-14 PROCEDURE — C1769 GUIDE WIRE: HCPCS

## 2022-06-14 PROCEDURE — 93458 L HRT ARTERY/VENTRICLE ANGIO: CPT | Performed by: INTERNAL MEDICINE

## 2022-06-14 PROCEDURE — 85610 PROTHROMBIN TIME: CPT

## 2022-06-14 PROCEDURE — 36415 COLL VENOUS BLD VENIPUNCTURE: CPT

## 2022-06-14 RX ORDER — ASPIRIN 81 MG/1
81 TABLET, CHEWABLE ORAL DAILY
Qty: 30 TABLET | Refills: 0 | Status: SHIPPED | OUTPATIENT
Start: 2022-06-15

## 2022-06-14 RX ORDER — ASPIRIN 81 MG/1
81 TABLET, CHEWABLE ORAL DAILY
Qty: 30 TABLET | Refills: 0 | Status: SHIPPED | OUTPATIENT
Start: 2022-06-15 | End: 2022-06-14

## 2022-06-14 RX ORDER — MAGNESIUM SULFATE IN WATER 40 MG/ML
2000 INJECTION, SOLUTION INTRAVENOUS PRN
Status: DISCONTINUED | OUTPATIENT
Start: 2022-06-14 | End: 2022-06-15 | Stop reason: HOSPADM

## 2022-06-14 RX ORDER — ATORVASTATIN CALCIUM 80 MG/1
80 TABLET, FILM COATED ORAL NIGHTLY
Qty: 30 TABLET | Refills: 0 | Status: SHIPPED | OUTPATIENT
Start: 2022-06-14 | End: 2022-06-29

## 2022-06-14 RX ORDER — CAPTOPRIL 12.5 MG/1
6.25 TABLET ORAL 2 TIMES DAILY
Qty: 30 TABLET | Refills: 0 | Status: SHIPPED | OUTPATIENT
Start: 2022-06-14 | End: 2022-06-14

## 2022-06-14 RX ORDER — ATORVASTATIN CALCIUM 80 MG/1
80 TABLET, FILM COATED ORAL NIGHTLY
Qty: 30 TABLET | Refills: 0 | Status: SHIPPED | OUTPATIENT
Start: 2022-06-14 | End: 2022-06-14

## 2022-06-14 RX ORDER — CAPTOPRIL 12.5 MG/1
6.25 TABLET ORAL 2 TIMES DAILY
Qty: 30 TABLET | Refills: 0 | Status: SHIPPED | OUTPATIENT
Start: 2022-06-14 | End: 2022-06-29

## 2022-06-14 RX ORDER — CLOPIDOGREL BISULFATE 75 MG/1
75 TABLET ORAL DAILY
Qty: 30 TABLET | Refills: 0 | Status: SHIPPED | OUTPATIENT
Start: 2022-06-15 | End: 2022-06-14 | Stop reason: HOSPADM

## 2022-06-14 RX ORDER — INSULIN LISPRO 100 [IU]/ML
0-12 INJECTION, SOLUTION INTRAVENOUS; SUBCUTANEOUS EVERY 6 HOURS
Status: DISCONTINUED | OUTPATIENT
Start: 2022-06-14 | End: 2022-06-15 | Stop reason: HOSPADM

## 2022-06-14 RX ADMIN — GABAPENTIN 600 MG: 600 TABLET, FILM COATED ORAL at 11:59

## 2022-06-14 RX ADMIN — ASPIRIN 81 MG CHEWABLE TABLET 81 MG: 81 TABLET CHEWABLE at 09:08

## 2022-06-14 RX ADMIN — ROPINIROLE HYDROCHLORIDE 1 MG: 1 TABLET, FILM COATED ORAL at 09:08

## 2022-06-14 RX ADMIN — INSULIN GLARGINE 60 UNITS: 100 INJECTION, SOLUTION SUBCUTANEOUS at 21:46

## 2022-06-14 RX ADMIN — LEVETIRACETAM 2000 MG: 500 TABLET, FILM COATED ORAL at 09:08

## 2022-06-14 RX ADMIN — METOPROLOL TARTRATE 25 MG: 25 TABLET, FILM COATED ORAL at 19:46

## 2022-06-14 RX ADMIN — CAPTOPRIL 6.25 MG: 12.5 TABLET ORAL at 00:06

## 2022-06-14 RX ADMIN — CAPTOPRIL 6.25 MG: 12.5 TABLET ORAL at 09:08

## 2022-06-14 RX ADMIN — METOPROLOL TARTRATE 25 MG: 25 TABLET, FILM COATED ORAL at 09:08

## 2022-06-14 RX ADMIN — DULOXETINE HYDROCHLORIDE 120 MG: 60 CAPSULE, DELAYED RELEASE ORAL at 09:08

## 2022-06-14 RX ADMIN — LACOSAMIDE 200 MG: 50 TABLET, FILM COATED ORAL at 00:28

## 2022-06-14 RX ADMIN — LACOSAMIDE 200 MG: 50 TABLET, FILM COATED ORAL at 21:45

## 2022-06-14 RX ADMIN — ARIPIPRAZOLE 10 MG: 10 TABLET ORAL at 09:08

## 2022-06-14 RX ADMIN — HEPARIN SODIUM 4000 UNITS: 1000 INJECTION INTRAVENOUS; SUBCUTANEOUS at 07:04

## 2022-06-14 RX ADMIN — METOPROLOL TARTRATE 25 MG: 25 TABLET, FILM COATED ORAL at 00:06

## 2022-06-14 RX ADMIN — SUCRALFATE 1 G: 1 TABLET ORAL at 00:06

## 2022-06-14 RX ADMIN — GABAPENTIN 600 MG: 600 TABLET, FILM COATED ORAL at 09:08

## 2022-06-14 RX ADMIN — IOPAMIDOL 60 ML: 755 INJECTION, SOLUTION INTRAVENOUS at 16:24

## 2022-06-14 RX ADMIN — MAGNESIUM SULFATE HEPTAHYDRATE 2000 MG: 40 INJECTION, SOLUTION INTRAVENOUS at 07:16

## 2022-06-14 RX ADMIN — ATORVASTATIN CALCIUM 80 MG: 80 TABLET, FILM COATED ORAL at 00:06

## 2022-06-14 RX ADMIN — POTASSIUM CHLORIDE 40 MEQ: 1500 TABLET, EXTENDED RELEASE ORAL at 09:07

## 2022-06-14 RX ADMIN — CLOPIDOGREL BISULFATE 75 MG: 75 TABLET ORAL at 09:08

## 2022-06-14 RX ADMIN — CLOPIDOGREL BISULFATE 75 MG: 75 TABLET ORAL at 00:32

## 2022-06-14 RX ADMIN — GABAPENTIN 600 MG: 600 TABLET, FILM COATED ORAL at 19:46

## 2022-06-14 RX ADMIN — ROPINIROLE HYDROCHLORIDE 1 MG: 1 TABLET, FILM COATED ORAL at 19:45

## 2022-06-14 RX ADMIN — GABAPENTIN 600 MG: 600 TABLET, FILM COATED ORAL at 16:34

## 2022-06-14 RX ADMIN — GABAPENTIN 600 MG: 600 TABLET, FILM COATED ORAL at 00:06

## 2022-06-14 RX ADMIN — SUCRALFATE 1 G: 1 TABLET ORAL at 16:34

## 2022-06-14 RX ADMIN — INSULIN LISPRO 8 UNITS: 100 INJECTION, SOLUTION INTRAVENOUS; SUBCUTANEOUS at 11:58

## 2022-06-14 RX ADMIN — ATORVASTATIN CALCIUM 80 MG: 80 TABLET, FILM COATED ORAL at 19:46

## 2022-06-14 RX ADMIN — INSULIN LISPRO 2 UNITS: 100 INJECTION, SOLUTION INTRAVENOUS; SUBCUTANEOUS at 00:05

## 2022-06-14 RX ADMIN — LACOSAMIDE 200 MG: 50 TABLET, FILM COATED ORAL at 09:08

## 2022-06-14 RX ADMIN — INSULIN LISPRO 6 UNITS: 100 INJECTION, SOLUTION INTRAVENOUS; SUBCUTANEOUS at 17:59

## 2022-06-14 RX ADMIN — ROPINIROLE HYDROCHLORIDE 1 MG: 1 TABLET, FILM COATED ORAL at 00:06

## 2022-06-14 RX ADMIN — ROPINIROLE HYDROCHLORIDE 1 MG: 1 TABLET, FILM COATED ORAL at 16:33

## 2022-06-14 RX ADMIN — PANTOPRAZOLE SODIUM 40 MG: 40 TABLET, DELAYED RELEASE ORAL at 16:38

## 2022-06-14 RX ADMIN — LEVETIRACETAM 2000 MG: 500 TABLET, FILM COATED ORAL at 19:45

## 2022-06-14 RX ADMIN — CAPTOPRIL 6.25 MG: 12.5 TABLET ORAL at 19:46

## 2022-06-14 ASSESSMENT — ENCOUNTER SYMPTOMS
DIARRHEA: 0
ABDOMINAL DISTENTION: 0
BACK PAIN: 0
SHORTNESS OF BREATH: 0
ABDOMINAL PAIN: 0
NAUSEA: 1
EYES NEGATIVE: 1
COLOR CHANGE: 0
SINUS PRESSURE: 0
VOMITING: 1
SINUS PAIN: 0
COUGH: 0

## 2022-06-14 ASSESSMENT — PAIN SCALES - GENERAL
PAINLEVEL_OUTOF10: 0
PAINLEVEL_OUTOF10: 0

## 2022-06-14 ASSESSMENT — PAIN DESCRIPTION - LOCATION: LOCATION: CHEST

## 2022-06-14 NOTE — PROGRESS NOTES
Pt admitted to  [phone] from ED. Complaints: Chest pain / discomfort. IV normal saline infusing into the antecubital left, condition patent and no redness. IV site free of s/s of infection or infiltration. Vital signs obtained. Assessment and data collection initiated. Two nurse skin assessment performed by Ismael Diamond and Johana HOLLOWAY. Oriented to room. Policies and procedures for 3B explained. Mika Winn RN discussed hourly rounding with patient addressing 5 P's. Fall prevention and safety brochure discussed with patient. Bed alarm on. Call light in reach. The best day to schedule a follow up Dr appointment is: Wednesday a.m. Explained patients right to have family, representative or physician notified of their admission. All questions answered with no further questions at this time.

## 2022-06-14 NOTE — PLAN OF CARE
Problem: Discharge Planning  Goal: Discharge to home or other facility with appropriate resources  6/14/2022 1208 by Bejnamin Kitchen RN  Outcome: Progressing  Flowsheets (Taken 6/14/2022 1208)  Discharge to home or other facility with appropriate resources: Identify barriers to discharge with patient and caregiver  Note: Patient working with case management for needs upon discharge. Problem: Safety - Adult  Goal: Free from fall injury  6/14/2022 1208 by Benjamin Kitchen RN  Outcome: Progressing  Note: Bed locked & in low position, call light in reach, side-rails up x2, bed/chair alarm utilized, non-slip socks on when ambulating, reminded patient to use call light to call for assistance. Problem: Pain  Goal: Verbalizes/displays adequate comfort level or baseline comfort level  6/14/2022 1208 by Benjamin Kitchen RN  Outcome: Progressing  Note: Ongoing assessment & interventions provided throughout shift. Reminded patient to report any pain, pressure, or shortness of breath to the nurse. Pain medications provided per physician's orders.       Problem: Nutrition Deficit:  Goal: Optimize nutritional status  6/14/2022 1208 by Benjamin Kitchen RN  Outcome: Progressing  Flowsheets (Taken 6/14/2022 1208)  Nutrient intake appropriate for improving, restoring, or maintaining nutritional needs: Assess nutritional status and recommend course of action     Problem: Chronic Conditions and Co-morbidities  Goal: Patient's chronic conditions and co-morbidity symptoms are monitored and maintained or improved  6/14/2022 1208 by Benjamin Kitchen RN  Outcome: Progressing  Flowsheets (Taken 6/14/2022 1208)  Care Plan - Patient's Chronic Conditions and Co-Morbidity Symptoms are Monitored and Maintained or Improved: Monitor and assess patient's chronic conditions and comorbid symptoms for stability, deterioration, or improvement     Problem: Cardiovascular - Adult  Goal: Maintains optimal cardiac output and hemodynamic stability  Outcome: Progressing  Note: Ongoing assessment & interventions provided throughout shift. Patient on continuous telemetry monitoring, heart tones, vitals & pulses checked at least 3 times per shift & PRN. Vitals within acceptable limits. Peripheral pulses palpable. Problem: Cardiovascular - Adult  Goal: Absence of cardiac dysrhythmias or at baseline  Outcome: Progressing    Care plan reviewed with patient. Patient verbalizes understanding of the care plan and contributed to goal setting.

## 2022-06-14 NOTE — PLAN OF CARE
Problem: Discharge Planning  Goal: Discharge to home or other facility with appropriate resources  Outcome: Progressing  Flowsheets (Taken 6/14/2022 0210)  Discharge to home or other facility with appropriate resources:   Identify barriers to discharge with patient and caregiver   Identify discharge learning needs (meds, wound care, etc)     Problem: Safety - Adult  Goal: Free from fall injury  Outcome: Progressing  Flowsheets (Taken 6/14/2022 0210)  Free From Fall Injury:   Instruct family/caregiver on patient safety   Based on caregiver fall risk screen, instruct family/caregiver to ask for assistance with transferring infant if caregiver noted to have fall risk factors     Problem: Pain  Goal: Verbalizes/displays adequate comfort level or baseline comfort level  Outcome: Progressing  Flowsheets (Taken 6/14/2022 0210)  Verbalizes/displays adequate comfort level or baseline comfort level:   Encourage patient to monitor pain and request assistance   Assess pain using appropriate pain scale   Administer analgesics based on type and severity of pain and evaluate response  Note: Patient currently on nitro gtt. Problem: Nutrition Deficit:  Goal: Optimize nutritional status  Outcome: Progressing  Flowsheets (Taken 6/14/2022 0212)  Nutrient intake appropriate for improving, restoring, or maintaining nutritional needs:   Assess nutritional status and recommend course of action   Monitor oral intake, labs, and treatment plans     Problem: Chronic Conditions and Co-morbidities  Goal: Patient's chronic conditions and co-morbidity symptoms are monitored and maintained or improved  Outcome: Progressing  Flowsheets (Taken 6/14/2022 0212)  Care Plan - Patient's Chronic Conditions and Co-Morbidity Symptoms are Monitored and Maintained or Improved: Monitor and assess patient's chronic conditions and comorbid symptoms for stability, deterioration, or improvement     Care plan reviewed with patient.   Patient verbalize understanding of the plan of care and contribute to goal setting.

## 2022-06-14 NOTE — CONSULTS
[facility] [address] [place] [address]  Cardiology Consult        Patient:  Jacki Drew  YOB: 1970  MRN: [id number]     Acct: [de-identified]    PCP: Jarek Puentes MD    Date of Admission: 6/13/2022      Reason for Consultation:  Unstable angina       History Of Present Illness:    46 y.o. pleasant male c hx of Bipolar disorder, Seizure disorder, hx PE, DM, HTN, recent foot surgery who presented to the hospital with complaints of chest pains. As per patient, the chest pain, started 3 days, substernal, pressure like, nonradiating, aggravated by exertion and alleviated by NTG gtt. Currently he reports pain of 5/10 still. EKG shows SR, no ST-T changes, serial enzymes were negative for ACS. Patient states changing his NTG gtt brings back pain. HbA1c: 7.7. Cardiology was consulted for chest pains. All labs, EKG's, diagnostic testing and images as well as cardiac cath, stress testing were reviewed during this encounter. Past Medical History:          Diagnosis Date    Abnormal thyroid biopsy     s/p left hemithyroidectomy 9/2935 - follicular adenoma    Acid reflux     Anemia     resolved - previously seen by Dr. Imani Carlson (due to enlarged spleen)    Anesthesia     seizures with brain surgery due to blood sugar got really high    Bile reflux gastritis     per EGD 11/16/21 - Dr. Esperanza Pablo - to start Carafate.     Bipolar disorder ([facility] [address]     Blood clot in vein 04/07/2016    Katie Mouse     Cancer [facility]) 04/2019    thyroid    Chest pain     previously seeing [facility] cardiologist, now seeing Dr. Woody Mccarty (LAD bridging on cath 4/2016)    Chronic back pain     Chronic bronchitis ([facility] [address]     Dr. Marlon Hyman Colon polyp 08/30/2016    Depression     seeing Sherie Greenfield DVT (deep venous thrombosis) ([facility] [address] [id number]    not on Coumadin due to unable to regulate - Dr. Ziggy Brown - no etiology found per patient    Esophageal abnormality     nodule     Fatty liver disease, nonalcoholic     U/S 90/9746 [facility]    Follicular adenoma of thyroid gland 01/15/2021    s/p lobectomy    Furuncle     legs    History of Doppler ultrasound 05/29/2011    No hemodynamically significant carotid stenosis is identified. A thyroid nodule on each side. Dedicated ultrasound of thyroid gland is suggested to further evaluate if clinically indicated.  History of pulmonary embolism 2007    s/p GFF, related to knee surgery    Hx of blood clots     leg and lung    Hyperlipidemia     severely elevated triglycerides    Hypertension     diastolic    Intracranial arachnoid cyst 11/2012    Dr. Jase Sagastume drained, complicated with seizures, DKA    Irritable bowel syndrome     Liver disease     Bette Cheikh - elevated LFT - positive smooth muscle antibody, steatosis per liver bx 3/2014 with Dr. Pam Low (multiple drug resistant organisms) resistance 2012    MRSA (methicillin resistant staph aureus) culture positive     h/o in foot and before brain surgery    Nephrolithiasis     noted on CT abdomen 9/2016, 6/2019    Neuropathy     Pancreatic insufficiency     Positive SANDY (antinuclear antibody)     Dr. Chauncey Gordon - first visit in 6/2013    Prolonged emergence from general anesthesia     Restless legs syndrome     Seizures ([address]     started with brain surgery    Sinus tachycardia     Holter 3/2013 - seeing Dr. Savage Left [facility])     clips     Sleep apnea     positive sleep apnea per study 10/2020.     Systolic dysfunction     \"systolic bridge\"  EF 15% ECHO 9/2019    Type II or unspecified type diabetes mellitus without mention of complication, not stated as uncontrolled 2007       Past Surgical History:          Procedure Laterality Date    CARDIAC CATHETERIZATION      2011,5-6 years ago   [address]  3-2012   [address]  04/28/2016    [address] RELEASE  01/2017    CHOLECYSTECTOMY  2004    COLONOSCOPY  2012    COLONOSCOPY  08/2016    2 tubular adenomas - reportedly needs repeat scope in 6 months    CRANIOTOMY  11/2012    arachnoid cyst drainage    EKG 12-LEAD  10/18/2015         ENDOSCOPY, COLON, DIAGNOSTIC      HERNIA REPAIR Right 1996    [facility]--Dr. Bernie Alan    INGUINAL HERNIA REPAIR Right 09/15/2016    Robotic assisted    KNEE ARTHROSCOPY Right 2016    KNEE SURGERY Left 2007    acl and debrided twice    OTHER SURGICAL HISTORY  5-31-11    Tilt table was associated w/ nonspecific symptoms or nausea, otherwise no significant dizziness or syncope. No significant hemodynamic changes. Otherwise, unremarkable tilt table test after 30 minutes of tilting.  OTHER SURGICAL HISTORY  01/20/2017    RIGHT SHOULDER ARTHROSCOPY, OPEN STAN, OPEN ACROMIOPLASTY, BICEP TENDESIS, RIGHT CARPAL TUNNEL RELEASE    SHOULDER SURGERY Right 01/2007    THYROID LOBECTOMY N/A 1/15/2021    THYROID LOBECTOMY, UVULOPALATOPHARYNGOPLAST LAOP performed by Criss Blanchard MD at [address] [facility] ECHOCARDIOGRAM  3-05-11    LV size and systolic function normal. EF 55-65%.  UPPER GASTROINTESTINAL ENDOSCOPY  2012    UPPER GASTROINTESTINAL ENDOSCOPY  2016    Dr. Rosalind Cota Left 10/22/2019    EGD DILATION SAVORY performed by Nova Hassan MD at [facility] ENDOSCOPY Left 10/22/2019    EGD BIOPSY performed by Nova Hassan MD at [address] [place] [address] ENDOSCOPY N/A 11/16/2021    EGD performed by Nova Hassan MD at [facility] ENDOSCOPY Left 11/16/2021    EGD BIOPSY performed by Nova Hassan MD at [address]  2007       Medications Prior to Admission:      Prior to Admission medications    Medication Sig Start Date End Date Taking? Authorizing Provider   Insulin Pen Needle 32G X 4 MM MISC Use 5 times daily with insulin pens 5/18/22   Jarek Puentes MD   gabapentin (NEURONTIN) 600 MG tablet Take 600 mg by mouth 4 times daily. Historical Provider, MD   vitamin D (ERGOCALCIFEROL) 1.25 MG (62426 UT) CAPS capsule Take 1 capsule by mouth once a week 4/26/22   Kieran Sandoval MD   promethazine (PHENERGAN) 12.5 MG tablet Take 1-2 tablets by mouth every 8 hours as needed for Nausea 4/26/22   Kieran Sandoval MD   metoprolol tartrate (LOPRESSOR) 25 MG tablet Take 1 tablet by mouth 2 times daily 4/26/22   Kieran Sandoval MD   lacosamide (VIMPAT) 200 MG tablet Take 1 tablet by mouth 2 times daily for 90 days.  4/7/22 7/6/22  Danny Moran MD   LORazepam (ATIVAN) 0.5 MG tablet Lorazepam Active 0.5 MG PO as needed 4/7/22 8/7/22  Danny Moran MD   blood glucose test strips (ONETOUCH ULTRA) strip 1 each by In Vitro route 5 times daily DX: E11.65 Dispense Onetouch Ultra 2 test strips 2/22/22   Kieran Sandoval MD   dabigatran (PRADAXA) 150 MG capsule Take 1 capsule by mouth 2 times daily  Patient not taking: Reported on 6/7/2022 2/20/22   Kieran Sandoval MD   sucralfate (CARAFATE) 1 GM tablet Take 1 g by mouth 4 times daily    Historical Provider, MD   rOPINIRole (REQUIP) 1 MG tablet TAKE 1 TABLET BY MOUTH THREE TIMES A DAY 12/23/21   Kieran Sandoval MD   ARIPiprazole (ABILIFY) 5 MG tablet Take 10 mg by mouth daily  12/10/21   Historical Provider, MD   vitamin B-12 (CYANOCOBALAMIN) 1000 MCG tablet Take 1,000 mcg by mouth 2 times daily     Historical Provider, MD   insulin glargine (BASAGLAR KWIKPEN) 100 UNIT/ML injection pen Inject 60 Units into the skin 2 times daily     Historical Provider, MD   DULoxetine (CYMBALTA) 60 MG extended release capsule Take 120 mg by mouth daily    Historical Provider, MD   levETIRAcetam (KEPPRA) 1000 MG tablet Take 2 tablets by mouth 2 times daily  Patient taking differently: Take 1,000 mg by mouth 2 times daily  8/4/21   Fermín Farias MD   insulin lispro (HUMALOG) 100 UNIT/ML injection vial Takes 25 units with meals plus group 2 sliding scale    Historical Provider, MD   pantoprazole (PROTONIX) 40 MG tablet Take 1 tablet by mouth 2 times daily 3/8/21   Chon Cason MD   sildenafil (VIAGRA) 100 MG tablet Take 1 tablet by mouth as needed for Erectile Dysfunction 1/29/19   Chon Cason MD       Current Facility-Administered Medications   Medication Dose Route Frequency Provider Last Rate Last Admin    insulin lispro (HUMALOG) injection vial 0-12 Units  0-12 Units SubCUTAneous Q6H Nan Cohn,         magnesium sulfate 2000 mg in 50 mL IVPB premix  2,000 mg IntraVENous PRN Annamae Lente, DO 25 mL/hr at 06/14/22 0716 2,000 mg at 06/14/22 0716    0.9 % sodium chloride infusion  1,000 mL IntraVENous Continuous Sonu Cerna MD   Stopped at 06/14/22 0657    nitroGLYCERIN 50 mg in dextrose 5% 250 mL infusion  5-200 mcg/min IntraVENous Continuous Sonu Cerna MD 6 mL/hr at 06/14/22 0718 20 mcg/min at 06/14/22 0718    sodium chloride flush 0.9 % injection 5-40 mL  5-40 mL IntraVENous 2 times per day Annamae Lente, DO        sodium chloride flush 0.9 % injection 10 mL  10 mL IntraVENous PRN Annamae Lente, DO        0.9 % sodium chloride infusion   IntraVENous PRN Annamae Lente, DO        ondansetron (ZOFRAN-ODT) disintegrating tablet 4 mg  4 mg Oral Q8H PRN Annamae Lente, DO        Or    ondansetron (ZOFRAN) injection 4 mg  4 mg IntraVENous Q6H PRN Annamae Lente, DO        polyethylene glycol (GLYCOLAX) packet 17 g  17 g Oral Daily PRN Annamae Lente, DO        acetaminophen (TYLENOL) tablet 650 mg  650 mg Oral Q6H PRN Annamae Lente, DO        Or    acetaminophen (TYLENOL) suppository 650 mg  650 mg Rectal Q6H PRN Annamae Lente, DO        glucose chewable tablet 16 g  4 tablet Oral PRN Annamae Lente, DO        dextrose bolus 10% 125 mL  125 mL IntraVENous PRN Annamae Lente, DO        Or    dextrose bolus 10% 250 mL  250 mL IntraVENous PRN Annamae Lente, DO        glucagon (rDNA) injection 1 mg  1 mg IntraMUSCular PRN Annamae Lente, DO        dextrose 5 % solution  100 mL/hr IntraVENous PRN Annamae Lente, DO        ARIPiprazole (ABILIFY) tablet 10 mg  10 mg Oral Daily Nan Cohn, DO        DULoxetine (CYMBALTA) extended release capsule 120 mg  120 mg Oral Daily Nan Cohn, DO        gabapentin (NEURONTIN) tablet 600 mg  600 mg Oral 4x Daily Nadja Feather, DO   600 mg at 06/14/22 0006    insulin glargine (LANTUS) injection vial 60 Units  60 Units SubCUTAneous BID Nan Cohn, DO        [Held by provider] insulin lispro (HUMALOG) injection vial 25 Units  25 Units SubCUTAneous TID  Nadja Feather, DO        lacosamide (VIMPAT) tablet 200 mg  200 mg Oral BID Nadja Feather, DO   200 mg at 06/14/22 0028    levETIRAcetam (KEPPRA) tablet 2,000 mg  2,000 mg Oral BID Nadja Feather, DO   2,000 mg at 06/13/22 2336    LORazepam (ATIVAN) tablet 0.5 mg  0.5 mg Oral Q4H PRN Nadja Feather, DO        metoprolol tartrate (LOPRESSOR) tablet 25 mg  25 mg Oral BID Nadja Feather, DO   25 mg at 06/14/22 0006    pantoprazole (PROTONIX) tablet 40 mg  40 mg Oral BID AC Nan Cohn, DO        rOPINIRole (REQUIP) tablet 1 mg  1 mg Oral TID Nadja Feather, DO   1 mg at 06/14/22 0006    sucralfate (CARAFATE) tablet 1 g  1 g Oral 4x Daily Nadja Feather, DO   1 g at 06/14/22 0006    aspirin chewable tablet 81 mg  81 mg Oral Daily Nan Cohn, DO        potassium chloride (KLOR-CON M) extended release tablet 40 mEq  40 mEq Oral PRN Nadja Feather, DO   40 mEq at 06/13/22 2336    Or    potassium bicarb-citric acid (EFFER-K) effervescent tablet 40 mEq  40 mEq Oral PRN Nadja Feather, DO        Or    potassium chloride 10 mEq/100 mL IVPB (Peripheral Line)  10 mEq IntraVENous PRN Nadja Feather, DO        atorvastatin (LIPITOR) tablet 80 mg  80 mg Oral Nightly Nadja Feather, DO   80 mg at 06/14/22 0006    captopril (CAPOTEN) tablet 6.25 mg  6.25 mg Oral BID Najda Feather, DO   6.25 mg at 06/14/22 0006    clopidogrel (PLAVIX) tablet 75 mg  75 mg Oral Daily Nadja Calixto DO   75 mg at 06/14/22 0032    heparin (porcine) injection 4,000 Units 4,000 Units IntraVENous PRN Layvonne Pride, DO   4,000 Units at 06/14/22 1613    heparin (porcine) injection 2,000 Units  2,000 Units IntraVENous PRN Layvonne Pride, DO        heparin 25,000 units in dextrose 5 % 250 mL infusion (rate based)  5-30 Units/kg/hr IntraVENous Continuous Layvonne Pride, DO 15.1 mL/hr at 06/14/22 0705 11.7 Units/kg/hr at 06/14/22 0705       Allergies:  Ciprofloxacin-ciproflox hcl er, Heparin, Metformin, Niacin and related, Tramadol hcl, Ultram [tramadol], Warfarin, and Other    Social History:    TOBACCO:   reports that he has never smoked. He has never used smokeless tobacco.  ETOH:   reports no history of alcohol use. Family History:        Problem Relation Age of Onset    Heart Disease Father 61        MI    Stroke Brother     Obesity Brother     Arthritis Mother     Seizures Mother     Obesity Sister     Other Brother         pulmonary embolism    Diabetes Daughter     Seizures Brother          Review of Systems -   General ROS: negative  Psychological ROS: negative  Hematological and Lymphatic ROS: No history of blood clots or bleeding disorder. Respiratory ROS: no cough, shortness of breath, or wheezing  Cardiovascular ROS: As per HPI  Gastrointestinal ROS: negative  Genito-Urinary ROS: no dysuria, trouble voiding, or hematuria  Musculoskeletal ROS: negative  Neurological ROS: no TIA or stroke symptoms  Dermatological ROS: negative    All others reviewed and are negative.        /69   Pulse 78   Temp 98 °F (36.7 °C) (Oral)   Resp 18   Ht 6' 2\" (1.88 m)   Wt 284 lb 6.3 oz (129 kg)   SpO2 94%   BMI 36.51 kg/m²       Physical Examination:   General appearance - alert, in no distress  Mental status - alert, oriented to person, place, and time  Neck - supple, no significant adenopathy, no JVD, or carotid bruits  Chest - clear to auscultation, no wheezes, rales or rhonchi, symmetric air entry  Heart - normal rate, regular rhythm, normal S1, S2, no murmurs, rubs, clicks or gallops  Abdomen - soft, nontender, nondistended, no masses or organomegaly  Neurological - alert, oriented, normal speech, no focal findings or movement disorder noted  Musculoskeletal - no joint tenderness, deformity or swelling  Extremities - peripheral pulses normal, no pedal edema, no clubbing or cyanosis  Skin - normal coloration and turgor, no rashes, no suspicious skin lesions noted      LABS:    Recent Labs     06/13/22  1633 06/13/22  1934   TROPONINT < 0.010 < 0.010     CBC:   Lab Results   Component Value Date    WBC 6.7 06/14/2022    RBC 4.46 06/14/2022    RBC 4.93 03/26/2012    HGB 12.9 06/14/2022    HCT 37.2 06/14/2022    MCV 83.4 06/14/2022    MCH 28.9 06/14/2022    MCHC 34.7 06/14/2022    RDW 12.9 12/01/2021     06/14/2022    MPV 10.2 06/14/2022     BMP:    Lab Results   Component Value Date     06/14/2022    K 3.5 06/14/2022    K 3.3 06/13/2022     06/14/2022    CO2 26 06/14/2022    BUN 7 06/14/2022    LABALBU 4.1 06/13/2022    LABALBU 4.3 03/23/2012    CREATININE 0.6 06/14/2022    CALCIUM 8.1 06/14/2022    GFRAA >60 12/04/2021    LABGLOM >90 06/14/2022    GLUCOSE 252 06/14/2022    GLUCOSE 189 11/12/2015     Hepatic Function Panel:    Lab Results   Component Value Date    ALKPHOS 162 06/13/2022    ALT 17 06/13/2022    AST 13 06/13/2022    PROT 7.2 06/13/2022    BILITOT 0.7 06/13/2022    BILIDIR 0.15 07/29/2021    IBILI 0.46 07/29/2021    LABALBU 4.1 06/13/2022    LABALBU 4.3 03/23/2012     Magnesium:    Lab Results   Component Value Date    MG 1.5 06/14/2022     Warfarin PT/INR:  No components found for: PTPATWAR, PTINRWAR  HgBA1c:    Lab Results   Component Value Date    LABA1C 7.7 06/07/2022    LABA1C 7.4 11/13/2021     FLP:    Lab Results   Component Value Date    TRIG 246 04/06/2021    HDL 26 12/29/2020    LDLCALC SEE BELOW 12/29/2020    LDLDIRECT 70.86 04/06/2021     TSH:    Lab Results   Component Value Date    TSH 2.730 06/14/2022     BNP: No components found for: PRO-BNP      Assessment/Plan:    Patient Active Problem List   Diagnosis    Depression    Liver disease    Restless legs syndrome    Chronic back pain    Other chest pain    Irritable bowel syndrome    Non compliance w medication regimen    Hyperlipidemia with target LDL less than 100    Sinus tachycardia    Neuropathy    Hyperammonemia (HCC)    Medication overdose, insulin    Suicide attempt ([address]    Lithium toxicity    Nausea & vomiting    Snoring    Hypogonadism    Erectile dysfunction due to diseases classified elsewhere    Rash    Headaches due to old head injury    Abdominal wall pain in left lower quadrant    Bipolar affective disorder ([address]    Hypogonadism in male    Type 2 diabetes mellitus with diabetic neuropathy ([address]    Hypomagnesemia    Partial symptomatic epilepsy with complex partial seizures, not intractable, without status epilepticus ([address]    Essential hypertension    Gastritis    Gastroesophageal reflux disease    Mixed hyperlipidemia    Acute lateral meniscus tear of right knee    Recurrent right inguinal hernia    Bicipital tendinitis of right shoulder    Carpal tunnel syndrome of right wrist    Obesity (BMI 30.0-34. 9)    Lump of axilla    Familial hypercholesterolemia    Exertional dyspnea    Vitamin D deficiency    Type II diabetes mellitus with manifestations, uncontrolled (HCC)    Chondromalacia of knee, right    Effusion of left knee    Other intervertebral disc degeneration, lumbar region    Palpitations    Splenomegaly    Steatohepatitis    Osteopenia    Follicular neoplasm of thyroid    Nasal obstruction    Nasal septal deviation    MISTI (obstructive sleep apnea)    Bipolar 1 disorder, depressed (HCC)    Status post uvulopalatopharyngoplasty    Status post partial thyroidectomy    Episodic confusion    MRSA infection within last 3 months    Nonhealing nonsurgical wound    Breakthrough seizure ([address]    Partial seizures ([address]  Tremors of nervous system    Inflammatory bowel disease    Chronic skin ulcer, limited to breakdown of skin (HCC)    Tarsal tunnel syndrome    Ulcer of foot due to type 2 diabetes mellitus (HCC)    Chronic deep vein thrombosis (DVT) of right peroneal vein (HCC)    Charcot's joint of left foot    Tarsal tunnel syndrome of both lower extremities    Ulcer of right foot with bone involvement without evidence of necrosis (HCC)    Chest pain     45 yo M c hx of DM, HTN, HLD, Bipolar disorder, Seizure disorder is here for evaluation of chest pain. Patient reports that chest pains are ongoing and only mildly alleviated by NTG gtt. EKG and enzymes are negative. Telemetry  Discussed about stress test but patient states he has ongoing chest pains  Keep NPO  Continue IV NTG and IV heparin  Continue Aspirin, plavix, statin, metoprolol  Schedule for LHC to evaluate coronaries  Recommend left heart cath to assess coronary anatomy. The indication, risks and benefits of the procedure and possible therapeutic consequences and alternatives were discussed with the patient. The patient was given the opportunity to ask questions and to have them answered to his/her satisfaction. Risks of the procedure include but are not limited to the following: Bleeding, hematoma including retroperitoneal hematoma, infection, pain and discomfort, injury to the aorta and other blood vessels, rhythm disturbance, low blood pressure, myocardial infarction, stroke, kidney damage/failure, myocardial perforation, allergic reactions to sedatives/contrast material, loss of pulse/vascular compromise requiring surgery, aneurysm/pseudoaneurysm formation, possible loss of a limb/hand/leg, death. Alternatives to and omission of the suggested procedure were discussed. The patient had no further questions and wished to proceed; the consent form was signed. Please do note hesitate to contact me for any further questions.    Thank you for the opportunity to be involved in this patient's care.     Code Status: Full Code    Electronically signed by Corina Olsen MD on 6/14/2022 at 8:35 AM

## 2022-06-14 NOTE — ED NOTES
Pt resting in bed watching tv. Updated pt on plan of care and room assignment. Respirations even and unlabored.      Shabbir Hill RN  06/13/22 8342

## 2022-06-14 NOTE — ED NOTES
ED to inpatient nurses report    Chief Complaint   Patient presents with    Chest Pain      Present to ED from home  LOC: alert and orientated to name, place, date  Vital signs   Vitals:    06/13/22 1753 06/13/22 1911 06/13/22 2042 06/13/22 2230   BP: 136/82 (!) 146/86 (!) 137/91 135/88   Pulse: 90 82 81 81   Resp: 19 15 16 17   Temp:       TempSrc:       SpO2: 94% 96% 96% 95%   Weight:          Oxygen Baseline 0    Current needs required 0 Bipap/Cpap No  LDAs:   Peripheral IV 06/13/22 Left Antecubital (Active)   Site Assessment Clean, dry & intact 06/13/22 2231   Line Status Infusing 06/13/22 2231   Phlebitis Assessment No symptoms 06/13/22 1634   Infiltration Assessment 0 06/13/22 1634   Dressing Status New dressing applied;Clean, dry & intact 06/13/22 1634   Dressing Type Transparent 06/13/22 1634   Dressing Intervention New 06/13/22 1634     Mobility: Requires assistance * 1 - uses cane  Pending ED orders: Nitro infusing  Present condition: continuous monitoring.      Electronically signed by Burnell Angelucci, RN on 6/13/2022 at 10:33 PM       Burnell Angelucci, RN  06/13/22 2234

## 2022-06-14 NOTE — CARE COORDINATION
6/14/22, 7:26 AM EDT  DISCHARGE PLANNING EVALUATION:    Zeeshan Luevano       Admitted: 6/13/2022/ 1618   Hospital day: 1   Location: -21/021-A Reason for admit: Chest pain [R07.9]  Chest pain, unspecified type [R07.9]   PMH:  has a past medical history of Abnormal thyroid biopsy, Acid reflux, Anemia, Anesthesia, Bile reflux gastritis, Bipolar disorder ([address] Blood clot in vein, Cancer ([address] Chest pain, Chronic back pain, Chronic bronchitis ([address] Colon polyp, Depression, DVT (deep venous thrombosis) ([address] Esophageal abnormality, Fatty liver disease, nonalcoholic, Follicular adenoma of thyroid gland, Furuncle, History of Doppler ultrasound, History of pulmonary embolism, Hx of blood clots, Hyperlipidemia, Hypertension, Intracranial arachnoid cyst, Irritable bowel syndrome, Liver disease, MDRO (multiple drug resistant organisms) resistance, MRSA (methicillin resistant staph aureus) culture positive, Nephrolithiasis, Neuropathy, Pancreatic insufficiency, Positive SANDY (antinuclear antibody), Prolonged emergence from general anesthesia, Restless legs syndrome, Seizures ([address] Sinus tachycardia, Skull fracture ([address] Sleep apnea, Systolic dysfunction, and Type II or unspecified type diabetes mellitus without mention of complication, not stated as uncontrolled. Procedure:   6/13 CXR: Stable left basilar scarring or atelectasis. 6/13 CTA chest: No PE. Similar mosaic groundglass veiling in the lungs may reflect areas of microatelectasis, although an inflammatory or infectious pneumonitis is not excluded. Barriers to Discharge:  Admitted through ED with chest pain. Troponins negative. Consulted Cardiology after NTG gtt and relieved pain. Heparin gtt also started. IVF. Electrolyte replacements. Magnesium 1.5 this am.     PCP: Chon Cason MD  Readmission Risk Score: 18.9 ( )%  Patient's Healthcare Decision Maker: Named in [address] [place] [address]    Patient Goals/Plan/Treatment Preferences: Spoke with Dhara.  He lives at home with his wife, daughter and grandkids. He is able to care for himself but does need some assistance due to recent foot surgery. His wife has been doing all the driving. States he has a cane, 2 wheeled walker and a wheelchair at home. Denies any DME or HH needs at this time, will monitor throughout stay. Verified PCP and insurance. Transportation/Food Security/Housekeeping Addressed:  No issues identified.

## 2022-06-14 NOTE — H&P
[address] [address]  Sedation/Analgesia History & Physical    Pt Name: April Stinson  Account number: [de-identified]  MRN: [id number]  YOB: 1970  Provider Performing Procedure: Leatha Cota MD MD  Referring Provider: Mirela Rizo MD   Primary Care Physician: Andrew Moya MD  Date: 6/14/2022    PRE-PROCEDURE    Code Status: FULL CODE  Brief History/Pre-Procedure Diagnosis:   [place]     Consent: : I have discussed with the patient risks, benefits, and alternatives (and relevant risks, benefits, and side effects related to alternatives or not receiving care), and likelihood of the success. The patient and/or representative appear to understand and agree to proceed. The discussion encompasses risks, benefits, and side effects related to the alternatives and the risks related to not receiving the proposed care, treatment, and services. The indication, risks and benefits of the procedure and possible therapeutic consequences and alternatives were discussed with the patient. The patient was given the opportunity to ask questions and to have them answered to his/her satisfaction. Risks of the procedure include but are not limited to the following: Bleeding, hematoma including retroperitoneal hemmorhage, infection, pain and discomfort, injury to the aorta and other blood vessels, rhythm disturbance, low blood pressure, myocardial infarction, stroke, kidney damage/failure, myocardial perforation, allergic reactions to sedatives/contrast material, loss of pulse/vascular compromise requiring surgery, aneurysm/pseudoaneurysm formation, possible loss of a limb/hand/leg, needing blood transfusion, requiring emergent open heart surgery or emergent coronary intervention, the need for intubation/respiratory support, the requirement for defibrillation/cardioversion, and death. Alternatives to and omission of the suggested procedure were discussed.  The patient had no further questions and wished to proceed; the consent form was signed. MEDICAL HISTORY   has a past medical history of Abnormal thyroid biopsy, Acid reflux, Anemia, Anesthesia, Bile reflux gastritis, Bipolar disorder ([address] Blood clot in vein, Cancer ([address] Chest pain, Chronic back pain, Chronic bronchitis ([address] Colon polyp, Depression, DVT (deep venous thrombosis) ([address] Esophageal abnormality, Fatty liver disease, nonalcoholic, Follicular adenoma of thyroid gland, Furuncle, History of Doppler ultrasound, History of pulmonary embolism, Hx of blood clots, Hyperlipidemia, Hypertension, Intracranial arachnoid cyst, Irritable bowel syndrome, Liver disease, MDRO (multiple drug resistant organisms) resistance, MRSA (methicillin resistant staph aureus) culture positive, Nephrolithiasis, Neuropathy, Pancreatic insufficiency, Positive SANDY (antinuclear antibody), Prolonged emergence from general anesthesia, Restless legs syndrome, Seizures ([address] Sinus tachycardia, Skull fracture ([address] Sleep apnea, Systolic dysfunction, and Type II or unspecified type diabetes mellitus without mention of complication, not stated as uncontrolled. SURGICAL HISTORY   has a past surgical history that includes knee surgery (Left, 2007); Vena Cava Filter Placement (2007); hernia repair (Right, 1996); Cholecystectomy (2004); Upper gastrointestinal endoscopy (2012); transthoracic echocardiogram (3-05-11); other surgical history (5-31-11); Cardiac catheterization; Cardiac catheterization (3-2012); craniotomy (11/2012); Tonsillectomy; Endoscopy, colon, diagnostic; EKG 12 Lead (10/18/2015); Knee arthroscopy (Right, 2016); Cardiac catheterization (04/28/2016); Upper gastrointestinal endoscopy (2016); Inguinal hernia repair (Right, 09/15/2016); Colonoscopy (2012); Colonoscopy (08/2016); other surgical history (01/20/2017); shoulder surgery (Right, 01/2007); Carpal tunnel release (01/2017); Upper gastrointestinal endoscopy (Left, 10/22/2019);  Upper gastrointestinal 4 tablet, Oral, PRN, Margarita Barraza DO    dextrose bolus 10% 125 mL, 125 mL, IntraVENous, PRN **OR** dextrose bolus 10% 250 mL, 250 mL, IntraVENous, PRN, Nan Cohn DO    glucagon (rDNA) injection 1 mg, 1 mg, IntraMUSCular, PRN, Nan Cohn DO    dextrose 5 % solution, 100 mL/hr, IntraVENous, PRN, Nan Cohn DO    ARIPiprazole (ABILIFY) tablet 10 mg, 10 mg, Oral, Daily, Nan Cohn DO, 10 mg at 06/14/22 0908    DULoxetine (CYMBALTA) extended release capsule 120 mg, 120 mg, Oral, Daily, Nan Cohn DO, 120 mg at 06/14/22 0908    gabapentin (NEURONTIN) tablet 600 mg, 600 mg, Oral, 4x Daily, Nan Cohn DO, 600 mg at 06/14/22 1159    insulin glargine (LANTUS) injection vial 60 Units, 60 Units, SubCUTAneous, BID, Nan Cohn DO    [Held by provider] insulin lispro (HUMALOG) injection vial 25 Units, 25 Units, SubCUTAneous, TID WC, Nan Cohn DO    lacosamide (VIMPAT) tablet 200 mg, 200 mg, Oral, BID, Nan Cohn DO, 200 mg at 06/14/22 0908    levETIRAcetam (KEPPRA) tablet 2,000 mg, 2,000 mg, Oral, BID, Nan Cohn DO, 2,000 mg at 06/14/22 0908    LORazepam (ATIVAN) tablet 0.5 mg, 0.5 mg, Oral, Q4H PRN, Nan Cohn DO    metoprolol tartrate (LOPRESSOR) tablet 25 mg, 25 mg, Oral, BID, Nan Cohn DO, 25 mg at 06/14/22 0908    pantoprazole (PROTONIX) tablet 40 mg, 40 mg, Oral, BID AC, Nan Cohn DO    rOPINIRole (REQUIP) tablet 1 mg, 1 mg, Oral, TID, Nan Cohn DO, 1 mg at 06/14/22 0908    sucralfate (CARAFATE) tablet 1 g, 1 g, Oral, 4x Daily, Nan Cohn DO, 1 g at 06/14/22 0006    aspirin chewable tablet 81 mg, 81 mg, Oral, Daily, Margarita Barraza DO, 81 mg at 06/14/22 0908    potassium chloride (KLOR-CON M) extended release tablet 40 mEq, 40 mEq, Oral, PRN, 40 mEq at 06/14/22 0907 **OR** potassium bicarb-citric acid (EFFER-K) effervescent tablet 40 mEq, 40 mEq, Oral, PRN **OR** potassium chloride 10 mEq/100 mL IVPB (Peripheral Line), 10 mEq, IntraVENous, PRN, Margarita Barraza, DO    atorvastatin (LIPITOR) tablet 80 mg, 80 mg, Oral, Nightly, Nan Cohn DO, 80 mg at 06/14/22 0006    captopril (CAPOTEN) tablet 6.25 mg, 6.25 mg, Oral, BID, Nan Cohn DO, 6.25 mg at 06/14/22 0908    clopidogrel (PLAVIX) tablet 75 mg, 75 mg, Oral, Daily, Coco Choi, DO, 75 mg at 06/14/22 0908    heparin (porcine) injection 4,000 Units, 4,000 Units, IntraVENous, PRN, [facility]bo, DO, 4,000 Units at 06/14/22 0704    heparin (porcine) injection 2,000 Units, 2,000 Units, IntraVENous, PRN, Nan Cohn DO    heparin 25,000 units in dextrose 5 % 250 mL infusion (rate based), 5-30 Units/kg/hr, IntraVENous, Continuous, Nan Cohn DO, Last Rate: 15.1 mL/hr at 06/14/22 0705, 11.7 Units/kg/hr at 06/14/22 0705  Prior to Admission medications    Medication Sig Start Date End Date Taking? Authorizing Provider   Insulin Pen Needle 32G X 4 MM MISC Use 5 times daily with insulin pens 5/18/22   Lennie Ayoub MD   gabapentin (NEURONTIN) 600 MG tablet Take 600 mg by mouth 4 times daily. Historical Provider, MD   vitamin D (ERGOCALCIFEROL) 1.25 MG (99649 UT) CAPS capsule Take 1 capsule by mouth once a week 4/26/22   Lennie Ayoub MD   promethazine (PHENERGAN) 12.5 MG tablet Take 1-2 tablets by mouth every 8 hours as needed for Nausea 4/26/22   Lennie Ayoub MD   metoprolol tartrate (LOPRESSOR) 25 MG tablet Take 1 tablet by mouth 2 times daily 4/26/22   Lennie Ayoub MD   lacosamide (VIMPAT) 200 MG tablet Take 1 tablet by mouth 2 times daily for 90 days.  4/7/22 7/6/22  Chantelle Cunningham MD   LORazepam (ATIVAN) 0.5 MG tablet Lorazepam Active 0.5 MG PO as needed 4/7/22 8/7/22  Chantelle Cunningham MD   blood glucose test strips (ONETOUCH ULTRA) strip 1 each by In Vitro route 5 times daily DX: E11.65 Dispense Onetouch Ultra 2 test strips 2/22/22   Lennie Ayoub MD   dabigatran (PRADAXA) 150 MG capsule Take 1 capsule by mouth 2 times daily  Patient not taking: Reported on 6/7/2022 2/20/22   Heena Moscoso Yumi Dillon MD   sucralfate (CARAFATE) 1 GM tablet Take 1 g by mouth 4 times daily    Historical Provider, MD   rOPINIRole (REQUIP) 1 MG tablet TAKE 1 TABLET BY MOUTH THREE TIMES A DAY 12/23/21   Rl Reeves MD   ARIPiprazole (ABILIFY) 5 MG tablet Take 10 mg by mouth daily  12/10/21   Historical Provider, MD   vitamin B-12 (CYANOCOBALAMIN) 1000 MCG tablet Take 1,000 mcg by mouth 2 times daily     Historical Provider, MD   insulin glargine (BASAGLAR KWIKPEN) 100 UNIT/ML injection pen Inject 60 Units into the skin 2 times daily     Historical Provider, MD   DULoxetine (CYMBALTA) 60 MG extended release capsule Take 120 mg by mouth daily    Historical Provider, MD   levETIRAcetam (KEPPRA) 1000 MG tablet Take 2 tablets by mouth 2 times daily  Patient taking differently: Take 1,000 mg by mouth 2 times daily  8/4/21   Josh Ann MD   insulin lispro (HUMALOG) 100 UNIT/ML injection vial Takes 25 units with meals plus group 2 sliding scale    Historical Provider, MD   pantoprazole (PROTONIX) 40 MG tablet Take 1 tablet by mouth 2 times daily 3/8/21   Rl Reeves MD   sildenafil (VIAGRA) 100 MG tablet Take 1 tablet by mouth as needed for Erectile Dysfunction 1/29/19   Rl Reeves MD     Additional information:       VITAL SIGNS   Vitals:    06/14/22 1508   BP: 120/72   Pulse: 76   Resp: 16   Temp: 98.2 °F (36.8 °C)   SpO2: 95%       PHYSICAL:   General: No acute distress  HEENT:  Unremarkable for age  Neck: without increased JVD, carotid pulses 2+ bilaterally without bruits  Heart: RRR, S1 & S2 WNL, S4 gallop, without murmurs or rubs   NYHA: 2  Lungs: Clear to auscultation    Abdomen: BS present, without HSM, masses, or tenderness    Extremities: without C,C,E.  Pulses 2+ bilaterally  Mental Status: Alert & Oriented        PLANNED PROCEDURE   [x]Cath  [x]PCI                []Pacemaker/AICD  []ERLIN             []Cardioversion []Peripheral angiography/PTA  []Other:      SEDATION  Planned agent: [x]Midazolam []Meperidine [x]Sublimaze []Morphine  []Diazepam  []Other:     ASA Classification:  []1 []2 [x]3 []4 []5  Class 1: A normal healthy patient  Class 2: Pt with mild to moderate systemic disease  Class 3: Severe systemic disease or disturbance  Class 4: Severe systemic disorders that are already life threatening. Class 5: Moribund pt with little chances of survival, for more than 24 hours. Mallampati I Airway Classification:   []1 []2 [x]3 []4    [x]Pre-procedure diagnostic studies complete and results available. Comment:    [x]Previous sedation/anesthesia experiences assessed. Comment:    [x]The patient is an appropriate candidate to undergo the planned procedure sedation and anesthesia. (Refer to nursing sedation/analgesia documentation record)  [x]Formulation and discussion of sedation/procedure plan, risks, and expectations with patient and/or responsible adult completed. [x]Patient examined immediately prior to the procedure.  (Refer to nursing sedation/analgesia documentation record)    Prosper Dee MD MD   Electronically signed 6/14/2022 at 4:01 PM

## 2022-06-14 NOTE — PROGRESS NOTES
DISCHARGE SUMMARY      Patient Identification:   Kiesha Arellano   : 1970  MRN: [id number]   Account: [de-identified]      Patient's PCP: Elmer Gorman MD    Admit Date: 2022     Discharge Date:   2022    Admitting Physician: Justice Yadav MD     Discharge Physician: Hoang Elmore DO     Discharge Diagnoses: Active Hospital Problems    Diagnosis Date Noted    Hyperlipidemia [E78.5]      Priority: High    Unstable angina ([address] [I20.0]      Priority: Medium    Chest pain [R07.9] 2022     Priority: Medium    Recurrent acute deep vein thrombosis (DVT) of lower extremity (HCC) [I82.409] 2022     Priority: Medium     Chest Pain s/p LHC without need for PCI  Hx of DVTs/PE, no longer takes Pradaxa as this was stopped by his PCP. Hypertension, added Captopril  Hyperlipidemia  Hypokalemia  Hypomagnesemia  Hx of Seizures  Type II DM  Diabetic Neuropathy  GERD  Erectile Dysfunction, hold Viagra for now on D/C due to chest pain. S/P Right foot metatarsal phalangeal joint resection/antibiotic beads and left foot achilles tendon lengthening  Thyroid Cancer s/p partial thyroidectomy  Hx of Anxiety and Depression  Restless Leg Syndrome  Obesity    The patient was seen and examined on day of discharge and this discharge summary is in conjunction with any daily progress note from day of discharge. Hospital Course:   Kiesha Arellano is a 46 y.o. male admitted to [facility] on 2022 for Chest Pain. Patient's troponins were negative. Cardiology was consulted and patient underwent heart cath on 2022. He did not require PCI for the heart cath. Patient's otherwise had his  potassium and magnesium replaced. His vitals remained stable and he otherwise had a unremarkable hospital course. Discussed with patient post cath that he could stay the night since his cath checks would be end late at night. Patientstated that he wanted to go home.   Of note, he was told by his PCP to no longer take Pradaxa for acute DVT. This can be discussed in the outpatient setting. Patient is to follow-up with cardiology on discharge. He is to continue taking his statin and low-dose captopril on discharge. Plavix was initiated in the hospital, however it was held on discharge as patient did not require the need for stenting. Patient to follow-up with cardiology which can determine the need for Plavix at that time. Patient stated that he was doing well post cath. He was instructed to follow up with his PCP and Cardiology on discharge. He was discharged in stable condition. Exam:     Vitals:  Vitals:    06/14/22 1745 06/14/22 1800 06/14/22 1815 06/14/22 1830   BP: (!) 152/89 (!) 152/80 137/78 (!) 106/90   Pulse: 82 85 80 80   Resp:       Temp:       TempSrc:       SpO2: 99% 93% 93% 95%   Weight:       Height:         Weight: Weight: 284 lb 6.3 oz (129 kg)     24 hour intake/output:    Intake/Output Summary (Last 24 hours) at 6/14/2022 1836  Last data filed at 6/14/2022 0859  Gross per 24 hour   Intake    Output 1375 ml   Net -1375 ml         Physical Exam  Constitutional:       General: He is not in acute distress. Appearance: Normal appearance. He is obese. He is not ill-appearing. HENT:      Head: Normocephalic and atraumatic. Nose: Nose normal.   Eyes:      General: No scleral icterus. Right eye: No discharge. Left eye: No discharge. Extraocular Movements: Extraocular movements intact. Conjunctiva/sclera: Conjunctivae normal.      Pupils: Pupils are equal, round, and reactive to light. Cardiovascular:      Rate and Rhythm: Normal rate and regular rhythm. Heart sounds: Normal heart sounds. No murmur heard. No gallop. Pulmonary:      Effort: Pulmonary effort is normal. No respiratory distress. Breath sounds: Normal breath sounds. No stridor. No wheezing, rhonchi or rales. Abdominal:      General: Abdomen is flat. There is no distension. Palpations: Abdomen is soft. Tenderness: There is no abdominal tenderness. There is no guarding. Musculoskeletal:         General: Normal range of motion. Cervical back: Normal range of motion and neck supple. Right lower leg: No edema. Left lower leg: No edema. Comments: Left leg in cast. Right foot wrapped. Neurological:      General: No focal deficit present. Mental Status: He is alert and oriented to person, place, and time. Psychiatric:         Mood and Affect: Mood normal.         Behavior: Behavior normal.     Labs: For convenience and continuity at follow-up the following most recent labs are provided:      CBC:    Lab Results   Component Value Date    WBC 6.7 06/14/2022    HGB 12.9 06/14/2022    HCT 37.2 06/14/2022     06/14/2022       Renal:    Lab Results   Component Value Date     06/14/2022    K 3.5 06/14/2022    K 3.3 06/13/2022     06/14/2022    CO2 26 06/14/2022    BUN 7 06/14/2022    CREATININE 0.6 06/14/2022    CALCIUM 8.1 06/14/2022    PHOS 3.9 06/13/2022         Significant Diagnostic Studies    Radiology:   CTA CHEST W WO CONTRAST   Final Result   1. No pulmonary emboli. 2. Similar mosaic groundglass veiling in the lungs may reflect areas of    microatelectasis, although an inflammatory or infectious pneumonitis is    not excluded. This document has been electronically signed by: Loren Montero MD on    06/13/2022 07:53 PM      All CTs at this facility use dose modulation techniques and iterative    reconstructions, and/or weight-based dosing   when appropriate to reduce radiation to a low as reasonably achievable. VL DUP LOWER EXTREMITY VENOUS BILATERAL   Final Result   Negative for bilateral lower extremity deep vein thrombosis.       This document has been electronically signed by: Loren Montero MD on    06/13/2022 07:15 PM      Technique Used: Duplex examination performed utilizing grayscale, color and spectral analysis. XR CHEST PORTABLE   Final Result   Stable left basilar scarring or atelectasis. No other acute findings. This document has been electronically signed by: Brittney Phillips MD on    06/13/2022 05:22 PM             Consults:     IP CONSULT TO CARDIOLOGY    Disposition:    [x] Home       [] TCU       [] Rehab       [] Psych       [] SNF       [] [facility]       [] Other-    Condition at Discharge: Stable    Code Status:  Full Code     Patient Instructions:    Discharge lab work: N/A  Activity: activity as tolerated  Diet: ADULT DIET; Regular; Low Fat/Low Chol/High Fiber/KATELYNN      Follow-up visits:   No follow-up provider specified. Discharge Medications:        Medication List      START taking these medications    aspirin 81 MG chewable tablet  Take 1 tablet by mouth daily  Start taking on: Vivienne 15, 2022     atorvastatin 80 MG tablet  Commonly known as: LIPITOR  Take 1 tablet by mouth nightly     captopril 12.5 MG tablet  Commonly known as: CAPOTEN  Take 0.5 tablets by mouth 2 times daily        CHANGE how you take these medications    levETIRAcetam 1000 MG tablet  Commonly known as: KEPPRA  Take 2 tablets by mouth 2 times daily  What changed: how much to take        CONTINUE taking these medications    ARIPiprazole 5 MG tablet  Commonly known as: ABILIFY     Basaglar KwikPen 100 UNIT/ML injection pen  Generic drug: insulin glargine     DULoxetine 60 MG extended release capsule  Commonly known as: CYMBALTA     gabapentin 600 MG tablet  Commonly known as: NEURONTIN     insulin lispro 100 UNIT/ML injection vial  Commonly known as: HUMALOG     Insulin Pen Needle 32G X 4 MM Misc  Use 5 times daily with insulin pens     lacosamide 200 MG tablet  Commonly known as: Vimpat  Take 1 tablet by mouth 2 times daily for 90 days.      LORazepam 0.5 MG tablet  Commonly known as: ATIVAN  Lorazepam Active 0.5 MG PO as needed     metoprolol tartrate 25 MG tablet  Commonly known as: LOPRESSOR  Take 1 tablet by mouth 2 times daily     OneTouch Ultra strip  Generic drug: blood glucose test strips  1 each by In Vitro route 5 times daily DX: E11.65 Dispense Onetouch Ultra 2 test strips     pantoprazole 40 MG tablet  Commonly known as: Protonix  Take 1 tablet by mouth 2 times daily     promethazine 12.5 MG tablet  Commonly known as: PHENERGAN  Take 1-2 tablets by mouth every 8 hours as needed for Nausea     rOPINIRole 1 MG tablet  Commonly known as: REQUIP  TAKE 1 TABLET BY MOUTH THREE TIMES A DAY     sucralfate 1 GM tablet  Commonly known as: CARAFATE     vitamin B-12 1000 MCG tablet  Commonly known as: CYANOCOBALAMIN     vitamin D 1.25 MG (97441 UT) Caps capsule  Commonly known as: ERGOCALCIFEROL  Take 1 capsule by mouth once a week        STOP taking these medications    dabigatran 150 MG capsule  Commonly known as: Pradaxa     sildenafil 100 MG tablet  Commonly known as: Viagra           Where to Get Your Medications      These medications were sent to [address] [place] [address] , [address], [address] [place] [address] [address] [address]    Phone: [phone]   · aspirin 81 MG chewable tablet  · atorvastatin 80 MG tablet  · captopril 12.5 MG tablet         Time Spent on discharge is more than 30 minutes in the examination, evaluation, counseling and review of medications and discharge plan. Signed: Thank you Barry Drake MD for the opportunity to be involved in this patient's care.     Electronically signed by Mark Torres DO on 6/14/2022 at 6:36 PM

## 2022-06-14 NOTE — H&P
Medicine Admission History & Physical      Patient:  Mattie Krishnamurthy  YOB: 1970  Date of Service: 6/14/2022  MRN: [id number]   Acct: [de-identified]   Primary Care Physician: Chelita Dominguez MD    Admitting Faculty MD: Mkiayla Waite MD    Code Status: Full Code No additional code details    Date of Service: Pt seen/examined in consultation on 6/14/2022     Assessment / Plan     Briefly, pt Mattie Krishnamurthy is a 46 y.o. male with a PMH of DVT/PE, hypertension, hyperlipidemia, type 2 diabetes mellitus, seizures, thyroid cancer s/p partial thyroidectomy, GERD, and obesity who presented with chest pain. 1. Unstable angina: Patient having substernal chest pain at rest which lasts from 10 to 20 minutes, intermittently radiates to left arm for last 3 days. No history of previous heart attacks in himself or family no CAD, no stents, and no hx of smoking. Sofi-Jose 65%, heart score 4. Troponin negative x2. EKG showed normal sinus rhythm, ventricular rate 90, QTc 420. Chest x-ray shows stable left basilar scarring or atelectasis, no other acute findings. CTA chest with without contrast shows no pulmonary emboli, similar mosaic groundglass feeling in lungs may reflect areas of microatelectasis, although an inflammatory infectious pneumonitis is not excluded. He received aspirin 324 mg oral once in the ED. Chest pain relieved with nitro drip that was started in ED. Plan:  -Consulted cardiology, appreciate recommendations  -Concern for ACS with unstable angina- Started on heparin drip, Plavix 75 mg oral daily, Lopressor 25 mg oral twice daily, Lipitor 80 mg oral nightly, captopril 6.25 mg oral twice daily, aspirin 81 mg oral daily  -Continue nitro drip  -Ordered TSH and Phosphorus levels  -Continuous telemetry monitoring    2. Hx of DVTs/PE: Patient had a PE in 2005 and around 6-7 blood clots since 2005. Most recent DVT in right lower extremity in December 2021.  Patient states he stopped taking his home Pradaxa before feet surgery and did not resume it after surgery. States he was compliant with Pradaxa from December 2021 to May 2022. Venous Doppler of bilateral lower extremity today was negative for DVTs. No swelling, erythema, or pain in bbilateral lower extremities. Plan:  -Currently on heparin drip for ACS    3. Recent surgery for right foot metatarsal phalangeal joint resection/antibiotic beads and left foot Achilles tendons lengthening: He had surgery at [facility] 3 weeks ago. Follows with Dr. Hugo Savage. Currently has a boot on the left leg and right leg is wrapped. He is not having any pain in his feet currently. Plan:  -Continue to follow-up with podiatry outpatient    4. Hypertension: Blood pressure was elevated at 164/87 on arrival. Most recent blood pressure stable, 136/86. Plan:  -Continue home Lopressor 25 mg oral twice daily  -Started captopril 6.25 mg oral twice daily for ACS treatment     5. Hyperlipidemia: Last lipid panel was on 12/29/2020 which showed total cholesterol 147, triglycerides 860, HDL 26, LDL admission valid as triglyceride was greater than 400. Patient states he does not follow with a cardiologist. He does follow with PCP. Plan:  -Started on Lipitor 80 mg oral nightly part of ACS treatment    6. Hypokalemia: Potassium 3.3 on arrival.  Plan:  -On potassium placement protocol  -Recheck potassium level, keep potassium >4  -Continue to monitor with daily BMP    7. Hypomagnesemia: Magnesium level 1.4 on arrival.  Plan:  -Ordered magnesium sulfate 2000 Mg IV once  -Recheck magnesium level, keep magnesium >2  -Magnesium replacement protocol in place    8. Seizures: Follows with neurologist in [place]. Last EEG was completed on 12/2/2021 for breakthrough seizure/encephalopathy. Plan:  -Continue home dose Vimpat 200 Mg oral twice daily and Keppra 2000 mg oral twice daily  -Continue home dose Ativan 0.5 mg oral every 4 hours as needed    9.  Type II Diabetes mellitus: Most recent hemoglobin A1c 7.7 on 6/7/2022. Blood Glucose 225 on arrival.  Patient takes insulin glargine 60 units twice daily and Humalog 25 units with meals plus prescription to sliding scale at home. Plan:  -Continue home dose Lantus 16 units twice daily  -Holding home dose Humalog 25 units 3 times daily with meals as patient is currently n.p.o.  -Started on medium sliding scale every 6 hours with POC glucose checks every 6 hours  -Hypoglycemia protocol in place    10. Diabetic neuropathy, history: Noted. Plan:  -Continue home dose gabapentin 600 mg oral 4 times daily    11. GERD, history: Noted. Patient states chest pain is different than his GERD pain. He had an EGD done on 11/2021. Plan:  -Continue home dose Protonix 40 mg twice daily    12. Erectile dysfunction, history: Noted. Plan:  -Hold home dose Viagra 100 mg as needed as patient is on nitro drip    13. Thyroid cancer s/p partial thyroidectomy in 2020: Noted. Not on any medications at home for this. Plan:  -Ordered TSH level    14. Anxiety/depression, history: Noted. Plan:  -Continue home dose Cymbalta 120 mg oral daily  -Continue home dose Abilify 10 mg oral daily    15. Restless leg syndrome, history: Noted. Plan:  -Continue home dose Requip 1 mg 3 times daily    16. Severe obesity: BMI 36.51 kg/m2. Plan:  -Encourage proper diet and exercise    Fluids:  NaCl infusion 1000 mL IV and 125 mL/HR over 8 hours started in ED  Electrolyte: Replacing potassium and magnesium  Nutrition: N.p.o.  GI: Glycolax 17 g oral daily as needed  DVT ppx: Heparin drip  Lines/Tubes: Peripheral IV in left antecubital  PT/OT/SLP: N/A    Admit to: 3B-21    General Medicine History and Physical     History provided by: Patient  Patient presents from: Home    Chief Complaint with Duration:  Chest pain    History of Present Illness:  Hallie Carter is a 46 y.o. male with a PMH of DVT/PE with [place] filter in place, hypertension, hyperlipidemia, type 2 diabetes mellitus, seizures, thyroid cancer s/p partial thyroidectomy, GERD, and obesity who presented with chest pain. Patient states he has had ongoing interemittent chest pain for the last 3 days. Patient states the pain started when he was sitting on his couch at home. He had surgery 3 weeks ago of his Achilles tendon in his left foot and part of his second metatarsal was removed due to diabetic ulcer on right foot. Patient states his stitches removed last Friday and he was taking antibiotics prescribed by his podiatrist at that time. Patient states that chest pain is left-sided, sharp and intermittent. States chest pain lasts from 5 minutes up to 20 minutes. The 20-minute chest pain occurred once yesterday. Patient rates the pain as a 7/10 today and with the nitro it has improved to 3-4/2010. He denies any alleviating or exacerbating factors. Patient states the pain radiates to his left shoulder at times. Patient states he has chronic vomiting episodes which he takes Phenergan for at home. He has had some diaphoresis as well. Patient states he was taking Pradaxa for a lower extremity DVT in December 2021. He stopped the Pradaxa 3 weeks ago before his surgery as his PCP had told him they had treated the clot long enough. Patient denies having any previous heart attacks, history of heart attack, stents, or known CAD. He denies having any abdominal pain, palpitations, syncope, lower extremity pain/swelling, lightheadedness, dizziness, constipation, or diarrhea. Summarized ED course: Initial vital signs included temperature 98.3, respiratory rate 19, pulse 92, blood pressure 164/87, SPO2 96% on room air. Patient was given Tylenol, aspirin 324 once, and started on IV fluids. Patient was started on nitro drip which helped alleviate his chest pain. Patient was admitted to hospitalist team for further care and evaluation.     Admission Labs:   CBC: WBC 9.2, hemoglobin 14, hematocrit 39, platelets 712   CMP: Sodium 136, potassium 3.3, chloride 98, CO2 29, BUN 7, creatinine 0.6, calcium 9.2, anion gap 9, GFR >90, ST 13, ALT 17, alkaline phosphatase 162, total protein 7.2, albumin 4.1, total bilirubin 0.7   Troponin x2- <0.010   .3   Magnesium 1.4   Osmolality 277    Admission Diagnostics/Imaging:   Chest x-ray shows stable left basilar scarring or atelectasis, no other acute findings.  Venous Doppler of lower extremity bilateral shows negative for bilateral lower extremity DVTs.  CTA chest with without contrast shows no pulmonary emboli, similar mosaic groundglass feeling in lungs may reflect areas of microatelectasis, although an inflammatory infectious pneumonitis is not excluded.  EKG: Normal sinus rhythm, ventricular rate 90, QTc 420. Past Medical History Past Surgical History    has a past medical history of Abnormal thyroid biopsy, Acid reflux, Anemia, Anesthesia, Bile reflux gastritis, Bipolar disorder ([address] Blood clot in vein, Cancer (HCC), Chest pain, Chronic back pain, Chronic bronchitis ([address] Colon polyp, Depression, DVT (deep venous thrombosis) ([address] Esophageal abnormality, Fatty liver disease, nonalcoholic, Follicular adenoma of thyroid gland, Furuncle, History of Doppler ultrasound, History of pulmonary embolism, Hx of blood clots, Hyperlipidemia, Hypertension, Intracranial arachnoid cyst, Irritable bowel syndrome, Liver disease, MDRO (multiple drug resistant organisms) resistance, MRSA (methicillin resistant staph aureus) culture positive, Nephrolithiasis, Neuropathy, Pancreatic insufficiency, Positive SANDY (antinuclear antibody), Prolonged emergence from general anesthesia, Restless legs syndrome, Seizures ([address] Sinus tachycardia, Skull fracture ([address] Sleep apnea, Systolic dysfunction, and Type II or unspecified type diabetes mellitus without mention of complication, not stated as uncontrolled. has a past surgical history that includes knee surgery (Left, 2007);  Vena Cava Filter Placement (2007); hernia repair (Right, 1996); Cholecystectomy (2004); Upper gastrointestinal endoscopy (2012); transthoracic echocardiogram (3-05-11); other surgical history (5-31-11); Cardiac catheterization; Cardiac catheterization (3-2012); craniotomy (11/2012); Tonsillectomy; Endoscopy, colon, diagnostic; EKG 12 Lead (10/18/2015); Knee arthroscopy (Right, 2016); Cardiac catheterization (04/28/2016); Upper gastrointestinal endoscopy (2016); Inguinal hernia repair (Right, 09/15/2016); Colonoscopy (2012); Colonoscopy (08/2016); other surgical history (01/20/2017); shoulder surgery (Right, 01/2007); Carpal tunnel release (01/2017); Upper gastrointestinal endoscopy (Left, 10/22/2019); Upper gastrointestinal endoscopy (Left, 10/22/2019); Thyroid lobectomy (N/A, 1/15/2021); Upper gastrointestinal endoscopy (N/A, 11/16/2021); and Upper gastrointestinal endoscopy (Left, 11/16/2021). Social History Family History    reports that he has never smoked. He has never used smokeless tobacco. He reports that he does not drink alcohol and does not use drugs. family history includes Arthritis in his mother; Diabetes in his daughter; Heart Disease (age of onset: 61) in his father; Obesity in his brother and sister; Other in his brother; Seizures in his brother and mother; Stroke in his brother.      Outpatient Medications   Current Outpatient Medications   Medication Instructions    ARIPiprazole (ABILIFY) 10 mg, Oral, DAILY    Basaglar KwikPen 60 Units, SubCUTAneous, 2 TIMES DAILY    blood glucose test strips (ONETOUCH ULTRA) strip 1 each, In Vitro, 5 TIMES DAILY, DX: E11.65 Dispense Onetouch Ultra 2 test strips    dabigatran (PRADAXA) 150 mg, Oral, 2 TIMES DAILY    DULoxetine (CYMBALTA) 120 mg, Oral, DAILY    gabapentin (NEURONTIN) 600 mg, Oral, 4 TIMES DAILY    insulin lispro (HUMALOG) 100 UNIT/ML injection vial Takes 25 units with meals plus group 2 sliding scale    Insulin Pen Needle 32G X 4 MM MISC Use 5 times daily with insulin pens    lacosamide (VIMPAT) 200 mg, Oral, 2 TIMES DAILY    levETIRAcetam (KEPPRA) 2,000 mg, Oral, 2 TIMES DAILY    LORazepam (ATIVAN) 0.5 MG tablet Lorazepam Active 0.5 MG PO as needed    metoprolol tartrate (LOPRESSOR) 25 mg, Oral, 2 TIMES DAILY    pantoprazole (PROTONIX) 40 mg, Oral, 2 TIMES DAILY    promethazine (PHENERGAN) 12.5-25 mg, Oral, EVERY 8 HOURS PRN    rOPINIRole (REQUIP) 1 MG tablet TAKE 1 TABLET BY MOUTH THREE TIMES A DAY    sildenafil (VIAGRA) 100 mg, Oral, PRN    sucralfate (CARAFATE) 1 g, Oral, 4 TIMES DAILY    vitamin B-12 (CYANOCOBALAMIN) 1,000 mcg, Oral, 2 TIMES DAILY    vitamin D (ERGOCALCIFEROL) 50,000 Units, Oral, WEEKLY        Allergies   Ciprofloxacin-ciproflox hcl er, Heparin, Metformin, Niacin and related, Tramadol hcl, Ultram [tramadol], Warfarin, and Other     Immunizations   There is no immunization history for the selected administration types on file for this patient. Review of Systems   Constitutional: Positive for diaphoresis and fatigue. Negative for chills and fever. HENT: Negative for congestion, sinus pressure and sinus pain. Eyes: Negative. Respiratory: Negative for cough and shortness of breath. Cardiovascular: Positive for chest pain. Gastrointestinal: Positive for nausea and vomiting. Negative for abdominal distention, abdominal pain and diarrhea. Genitourinary: Negative for dysuria, flank pain, hematuria and urgency. Musculoskeletal: Negative for arthralgias, back pain, gait problem and joint swelling. Skin: Negative for color change, pallor, rash and wound. Neurological: Negative for dizziness, seizures, syncope, weakness, light-headedness and headaches. Hematological: Negative. Psychiatric/Behavioral: Negative.      - negative except for the aforementioned    Objective     Vitals:  /86   Pulse 69   Temp 97.9 °F (36.6 °C) (Oral)   Resp 18   Ht 6' 2\" (1.88 m)   Wt 284 lb 6.3 oz (129 kg)   SpO2 94%   BMI 36.51 kg/m²     Physical Exam  Constitutional:       Appearance: Normal appearance. He is obese. HENT:      Head: Normocephalic and atraumatic. Nose: Nose normal.      Mouth/Throat:      Mouth: Mucous membranes are moist.      Pharynx: Oropharynx is clear. Eyes:      Pupils: Pupils are equal, round, and reactive to light. Cardiovascular:      Rate and Rhythm: Normal rate and regular rhythm. Pulses: Normal pulses. Heart sounds: Normal heart sounds. Pulmonary:      Effort: Pulmonary effort is normal.      Breath sounds: Normal breath sounds. Abdominal:      General: Bowel sounds are normal.      Palpations: Abdomen is soft. Tenderness: There is no abdominal tenderness. Musculoskeletal:         General: No tenderness. Normal range of motion. Cervical back: Normal range of motion and neck supple. Right lower leg: No edema. Left lower leg: No edema. Comments: Boot on left lower extremity and right lower extremity wrapped   Skin:     General: Skin is warm and dry. Capillary Refill: Capillary refill takes less than 2 seconds. Neurological:      General: No focal deficit present. Mental Status: He is alert and oriented to person, place, and time.    Psychiatric:         Mood and Affect: Mood normal.         Behavior: Behavior normal.          Labs:   Results for orders placed or performed during the hospital encounter of 06/13/22   CBC with Auto Differential   Result Value Ref Range    WBC 9.2 4.8 - 10.8 thou/mm3    RBC 4.81 4.70 - 6.10 mill/mm3    Hemoglobin 14.0 14.0 - 18.0 gm/dl    Hematocrit 39.0 (L) 42.0 - 52.0 %    MCV 81.1 80.0 - 94.0 fL    MCH 29.1 26.0 - 33.0 pg    MCHC 35.9 (H) 32.2 - 35.5 gm/dl    RDW-CV 12.8 11.5 - 14.5 %    RDW-SD 36.7 35.0 - 45.0 fL    Platelets 017 140 - 179 thou/mm3    MPV 10.1 9.4 - 12.4 fL    Seg Neutrophils 64.1 %    Lymphocytes 20.2 %    Monocytes 7.2 %    Eosinophils 7.2 %    Basophils 0.2 %    Immature Granulocytes 1.1 %    Segs Absolute 5.9 1.8 - 7.7 thou/mm3    Lymphocytes Absolute 1.9 1.0 - 4.8 thou/mm3    Monocytes Absolute 0.7 0.4 - 1.3 thou/mm3    Eosinophils Absolute 0.7 (H) 0.0 - 0.4 thou/mm3    Basophils Absolute 0.0 0.0 - 0.1 thou/mm3    Immature Grans (Abs) 0.10 (H) 0.00 - 0.07 thou/mm3    nRBC 0 /100 wbc   Comprehensive Metabolic Panel w/ Reflex to MG   Result Value Ref Range    Glucose 225 (H) 70 - 108 mg/dL    CREATININE 0.6 0.4 - 1.2 mg/dL    BUN 7 7 - 22 mg/dL    Sodium 136 135 - 145 meq/L    Potassium reflex Magnesium 3.3 (L) 3.5 - 5.2 meq/L    Chloride 98 98 - 111 meq/L    CO2 29 23 - 33 meq/L    Calcium 9.2 8.5 - 10.5 mg/dL    AST 13 5 - 40 U/L    Alkaline Phosphatase 162 (H) 38 - 126 U/L    Total Protein 7.2 6.1 - 8.0 g/dL    Albumin 4.1 3.5 - 5.1 g/dL    Total Bilirubin 0.7 0.3 - 1.2 mg/dL    ALT 17 11 - 66 U/L   Troponin   Result Value Ref Range    Troponin T < 0.010 ng/ml   Brain Natriuretic Peptide   Result Value Ref Range    Pro-.3 0.0 - 900.0 pg/mL   Anion Gap   Result Value Ref Range    Anion Gap 9.0 8.0 - 16.0 meq/L   Glomerular Filtration Rate, Estimated   Result Value Ref Range    Est, Glom Filt Rate >90 ml/min/1.73m2   Magnesium   Result Value Ref Range    Magnesium 1.4 (L) 1.6 - 2.4 mg/dL   Osmolality   Result Value Ref Range    Osmolality Calc 277.0 275.0 - 300.0 mOsmol/kg   Troponin   Result Value Ref Range    Troponin T < 0.010 ng/ml   Phosphorus   Result Value Ref Range    Phosphorus 3.9 2.4 - 4.7 mg/dL   CBC   Result Value Ref Range    WBC 8.9 4.8 - 10.8 thou/mm3    RBC 4.70 4.70 - 6.10 mill/mm3    Hemoglobin 13.8 (L) 14.0 - 18.0 gm/dl    Hematocrit 38.3 (L) 42.0 - 52.0 %    MCV 81.5 80.0 - 94.0 fL    MCH 29.4 26.0 - 33.0 pg    MCHC 36.0 (H) 32.2 - 35.5 gm/dl    RDW-CV 13.0 11.5 - 14.5 %    RDW-SD 37.2 35.0 - 45.0 fL    Platelets 801 425 - 527 thou/mm3    MPV 10.1 9.4 - 12.4 fL   APTT   Result Value Ref Range    aPTT 82.0 (H) 22.0 - 38.0 seconds   Protime-INR   Result Value Ref Range    INR 1.07 0.85 - 1.13   POCT Glucose   Result Value Ref Range    POC Glucose 172 (H) 70 - 108 mg/dl   EKG 12 Lead   Result Value Ref Range    Ventricular Rate 90 BPM    Atrial Rate 90 BPM    P-R Interval 170 ms    QRS Duration 80 ms    Q-T Interval 344 ms    QTc Calculation (Bazett) 420 ms    P Axis 44 degrees    R Axis 38 degrees    T Axis 36 degrees       Diagnostics:  CTA CHEST W WO CONTRAST    Result Date: 6/13/2022  CT angiography chest with contrast. 3D Postprocessing. Comparison: CT,SR - CTA CHEST W WO CONTRAST - 12/30/2021 03:54 PM EST Findings: The pulmonary arteries are well opacified. Scattered plaque is seen in the thoracic aorta without aneurysm or dissection. The heart is not enlarged. There is no pericardial effusion. There are calcified right hilar lymph nodes. Partially calcified right thyroid nodule. Left thyroid lobe is absent. Similar areas of mosaic groundglass veiling in the lungs. No pleural effusion or pneumothorax. There are no worrisome pulmonary masses or nodules. Gallbladder is absent. Calcified granuloma in the spleen. No acute fractures. 1. No pulmonary emboli. 2. Similar mosaic groundglass veiling in the lungs may reflect areas of microatelectasis, although an inflammatory or infectious pneumonitis is not excluded. This document has been electronically signed by: Saeid Amin MD on 06/13/2022 07:53 PM All CTs at this facility use dose modulation techniques and iterative reconstructions, and/or weight-based dosing when appropriate to reduce radiation to a low as reasonably achievable. XR CHEST PORTABLE    Result Date: 6/13/2022  1 view chest x-ray Comparison: CR,SR - XR CHEST PORTABLE - 12/30/2021 03:08 PM EST Findings: Stable linear left basilar scarring or atelectasis. No pleural effusion or pneumothorax. Heart size is normal. No pulmonary vascular congestion. Surgical clips in the left neck. No acute fracture. Stable left basilar scarring or atelectasis. No other acute findings. This document has been electronically signed by: Gaurav Torres MD on 06/13/2022 05:22 PM    VL DUP LOWER EXTREMITY VENOUS BILATERAL    Result Date: 6/13/2022  Venous duplex ultrasound bilateral lower extremity Comparison: US,SR - VL DUP LOWER EXTREMITY VENOUS LEFT - 05/09/2022 12:47 PM EDT Findings: The visualized deep veins are fully compressible with normal Doppler color flow and spectral tracings. No popliteal cyst.     Negative for bilateral lower extremity deep vein thrombosis. This document has been electronically signed by: Gaurav Torres MD on 06/13/2022 07:15 PM Technique Used: Duplex examination performed utilizing grayscale, color and spectral analysis.       EKG:   As above    Micro:  MRSA    Signed:  Volodymyr Bella DO  Internal Medicine, PGY-1  06/14/22  3:06 AM    Staff: Inocencia Parsons MD

## 2022-06-14 NOTE — PROGRESS NOTES
PROGRESS NOTE      Patient:  Pierre Bridges      Unit/Bed:3B-21/021-A    YOB: 1970    MRN: [id number]       Acct: [de-identified]     PCP: Rudolph Torres MD    Date of Admission: 6/13/2022      Assessment/Plan:    Anticipated Discharge in : Pending clinical course    Active Hospital Problems    Diagnosis Date Noted    Chest pain [R07.9] 06/13/2022     Priority: Medium     Unstable Angina  Continues to have substernal chest pain at rest, radiates to left arm. Troponin negative x2  EKG showed NSR  CTA of chest showed no PE. ASA x1 in ED. Cardiology consulted. Planning cath today  Continue heparin drip  Continue Plavix    Hx of DVTs/PE  Noted PE in 2005, has had around 6-7 blood clots since. Most recent DVT December 2021  On Pradaxa. Has not been taking since his surgery. Venous Doppler 6/13 No PE  Continue Heparin drip  Consider resuming Pradaxa once discharged.     Hypertension  BP stable  Continue Lopressor  Continue Captopril 6.25 mg oral BID for ACS treatment    Hyperlipidemia  Started on Lipitor 80 mg nightly as part of ACS treatment    Hypokalemia  3.3 on arrival  Keep K >4  Potassium Replacement ordered   Monitor with daily BMP    Hypomagnesemia  Magnesium level 1.4 on arrival  Replacement protocol in place  Continue with daily BMP    Seizures  Follows with neurologist in [place]  Continue home meds  Ativan PRN     Type II DM  Continue home Lantus, ISS  Holding home does of Humalog as patient is nPO  Hypoglycemic Protocols in place    Diabetic Neuropathy  Continue home dose Gabapentin    GERD  Continue home PPI    Erectile Dysfunction  Hold home Viagra    S/P Right foot metatarsal phalangeal joint resection/antibiotic beads and left foot achilles tendon lengthening  Had surgery 3 weeks ago at [facility]   Follow up with Podiatry outpatient     Thyroid Cancer s/p partial thyroidectomy  TSH WNL    Hx of Anxiety and Depression  Continue home meds    Restless Leg Syndrome  Continue home Requip    Obesity  Encourage lifestyle modifications. Chief Complaint: Chest Pain    Hospital Course:   Per initial H&P:    \"Nicolas Fontana is a 46 y.o. male with a PMH of DVT/PE with Drain filter in place, hypertension, hyperlipidemia, type 2 diabetes mellitus, seizures, thyroid cancer s/p partial thyroidectomy, GERD, and obesity who presented with chest pain. Patient states he has had ongoing interemittent chest pain for the last 3 days. Patient states the pain started when he was sitting on his couch at home. He had surgery 3 weeks ago of his Achilles tendon in his left foot and part of his second metatarsal was removed due to diabetic ulcer on right foot. Patient states his stitches removed last Friday and he was taking antibiotics prescribed by his podiatrist at that time. Patient states that chest pain is left-sided, sharp and intermittent. States chest pain lasts from 5 minutes up to 20 minutes. The 20-minute chest pain occurred once yesterday. Patient rates the pain as a 7/10 today and with the nitro it has improved to 3-4/2010. He denies any alleviating or exacerbating factors. Patient states the pain radiates to his left shoulder at times. Patient states he has chronic vomiting episodes which he takes Phenergan for at home. He has had some diaphoresis as well. Patient states he was taking Pradaxa for a lower extremity DVT in December 2021. He stopped the Pradaxa 3 weeks ago before his surgery as his PCP had told him they had treated the clot long enough. Patient denies having any previous heart attacks, history of heart attack, stents, or known CAD. He denies having any abdominal pain, palpitations, syncope, lower extremity pain/swelling, lightheadedness, dizziness, constipation, or diarrhea.     Summarized ED course: Initial vital signs included temperature 98.3, respiratory rate 19, pulse 92, blood pressure 164/87, SPO2 96% on room air.  Patient was given Tylenol, aspirin 324 once, and started on IV fluids. Patient was started on nitro drip which helped alleviate his chest pain. Patient was admitted to hospitalist team for further care and evaluation. \"    Subjective:  Patient seen and examined bedside. Patient states he still has some sternal chest pressure. Sharp that radiates to left shoulder. 1-2/10 in intensity. Denies SOB, n/v/d. No swelling. On Heparin drip. Cardiology consulted, planning cath. Vitals stable.       Medications:  Reviewed    Infusion Medications    sodium chloride Stopped (06/14/22 0657)    nitroGLYCERIN 20 mcg/min (06/14/22 0718)    sodium chloride      dextrose      heparin (PORCINE) Infusion 11.7 Units/kg/hr (06/14/22 0705)     Scheduled Medications    insulin lispro  0-12 Units SubCUTAneous Q6H    sodium chloride flush  5-40 mL IntraVENous 2 times per day    ARIPiprazole  10 mg Oral Daily    DULoxetine  120 mg Oral Daily    gabapentin  600 mg Oral 4x Daily    insulin glargine  60 Units SubCUTAneous BID    [Held by provider] insulin lispro  25 Units SubCUTAneous TID WC    lacosamide  200 mg Oral BID    levETIRAcetam  2,000 mg Oral BID    metoprolol tartrate  25 mg Oral BID    pantoprazole  40 mg Oral BID AC    rOPINIRole  1 mg Oral TID    sucralfate  1 g Oral 4x Daily    aspirin  81 mg Oral Daily    atorvastatin  80 mg Oral Nightly    captopril  6.25 mg Oral BID    clopidogrel  75 mg Oral Daily     PRN Meds: magnesium sulfate, sodium chloride flush, sodium chloride, ondansetron **OR** ondansetron, polyethylene glycol, acetaminophen **OR** acetaminophen, glucose, dextrose bolus **OR** dextrose bolus, glucagon (rDNA), dextrose, LORazepam, potassium chloride **OR** potassium alternative oral replacement **OR** potassium chloride, heparin (porcine), heparin (porcine)      Intake/Output Summary (Last 24 hours) at 6/14/2022 1223  Last data filed at 6/14/2022 0859  Gross per 24 hour   Intake --   Output 1375 ml   Net -1375 ml       Diet:  Diet NPO    Exam:  /79   Pulse 71   Temp 98.5 °F (36.9 °C) (Oral)   Resp 18   Ht 6' 2\" (1.88 m)   Wt 284 lb 6.3 oz (129 kg)   SpO2 95%   BMI 36.51 kg/m²     Physical Exam  Constitutional:       General: He is not in acute distress. Appearance: Normal appearance. He is obese. He is not ill-appearing. HENT:      Head: Normocephalic and atraumatic. Nose: Nose normal.   Eyes:      General: No scleral icterus. Right eye: No discharge. Left eye: No discharge. Extraocular Movements: Extraocular movements intact. Conjunctiva/sclera: Conjunctivae normal.      Pupils: Pupils are equal, round, and reactive to light. Cardiovascular:      Rate and Rhythm: Normal rate and regular rhythm. Heart sounds: Normal heart sounds. No murmur heard. No gallop. Pulmonary:      Effort: Pulmonary effort is normal. No respiratory distress. Breath sounds: Normal breath sounds. No stridor. No wheezing, rhonchi or rales. Abdominal:      General: Abdomen is flat. There is no distension. Palpations: Abdomen is soft. Tenderness: There is no abdominal tenderness. There is no guarding. Musculoskeletal:         General: Normal range of motion. Cervical back: Normal range of motion and neck supple. Right lower leg: No edema. Left lower leg: No edema. Comments: Left leg in cast. Right foot wrapped. Neurological:      General: No focal deficit present. Mental Status: He is alert and oriented to person, place, and time.    Psychiatric:         Mood and Affect: Mood normal.         Behavior: Behavior normal.         Labs:   Recent Labs     06/13/22  1633 06/14/22  0016 06/14/22  0600   WBC 9.2 8.9 6.7   HGB 14.0 13.8* 12.9*   HCT 39.0* 38.3* 37.2*    270 199     Recent Labs     06/13/22  1633 06/13/22  1934 06/14/22  0600     --  140   K 3.3*  --  3.5   CL 98  --  103   CO2 29  --  26   BUN 7  --  7   CREATININE 0.6  --  0.6   CALCIUM 9.2  -- Other-    Code Status: Full Code    PT/OT Eval Status: N/A      Electronically signed by Celestino Grossman DO on 6/14/2022 at 12:23 PM    This note was electronically signed. Parts of this note may have been dictated by use of voice recognition software and electronically transcribed. The note may contain errors not detected in proofreading. Please refer to my supervising physician's documentation if my documentation differs.

## 2022-06-15 ENCOUNTER — CARE COORDINATION (OUTPATIENT)
Dept: CASE MANAGEMENT | Age: 52
End: 2022-06-15

## 2022-06-15 DIAGNOSIS — R07.9 CHEST PAIN, UNSPECIFIED TYPE: Primary | ICD-10-CM

## 2022-06-15 PROCEDURE — 1111F DSCHRG MED/CURRENT MED MERGE: CPT | Performed by: INTERNAL MEDICINE

## 2022-06-15 NOTE — TELEPHONE ENCOUNTER
Keep appointment as scheduled at this time. Start meds and see how he dose. He needs to establish with PCP. Thank you.

## 2022-06-15 NOTE — PROGRESS NOTES
2230, Patient d/c- wheeled down by staff in good condition . Cath site intact with no signs of bleeding. Paper worker explained to patient by charge nurse. Questions and concerns addressed .

## 2022-06-15 NOTE — CARE COORDINATION
[facility] Transitions Initial Follow Up Call    Call within 2 business days of discharge: Yes    Patient: Fei James Patient : 1970   MRN: [id number]  Reason for Admission: Chest pain  Discharge Date: 22 RARS: Readmission Risk Score: 20.2 ( )      Last Discharge [facility]       Complaint Diagnosis Description Type Department Provider    22 Chest Pain Chest pain, unspecified type ED to Hosp-Admission (Discharged) (ADMITTED) STACEY Madrid MD; Isabel Hunt. .. Spoke with Dhara, said he is no better, still has chest pain, radiate to Left shoulder at times. Had a heart cath with no stents. Will send a message to cardio, has appt . Doesn't have PCP appt yet, agreed to assistance, sent to [facility]. Appetite and fluid intake is good. BS have been stable. Reviewed medications/changes. Said that [facility] have the Lipitor or Capoten ready until tomorrow. Called [facility] and is correct, has to order Capoten and won't be in until after 2pm tomorrow. Called Hector back and was ok with trying another pharmacy. Called [facility] and they have both meds and will call [facility] for transfer and will be ready today at 130pm.  Henrik York back to tell him. Denies other needs. No other questions or concerns at this time. Will continue to follow.     Non-face-to-face services provided:  [facility]  Scheduled appointment with Lima Bateman [facility].   Obtained and reviewed discharge summary and/or continuity of care documents    Care Transitions 24 Hour Call    Schedule Follow Up Appointment with PCP: Completed  Do you have a copy of your discharge instructions?: Yes  Do you have all of your prescriptions and are they filled?: No  Have you been contacted by a [facility] JANIE CHEUNG Pharmacist?: No  Have you scheduled your follow up appointment?: Yes  How are you going to get to your appointment?: Car - drive self  Patient DME: Straight cane, Walker  Do you have support at home?: Grandchild, Partner/Spouse/SO  Do you feel like you have everything you need to keep you well at home?: Yes  Are you an active caregiver in your home?: Yes  Care Transitions Interventions     Other Services: Completed (Comment: Tri juarez, Dial A Dietician, and Diabetic Diet Information Mailed)   Diabetes Education: Completed         Transitions of Care Initial Call      Challenges to be reviewed by the provider   Additional needs identified to be addressed with provider: Yes  Spoke with [facility], said he is no better, still has chest pain, radiate to Left shoulder at times. Had a heart cath with no stents. Appt . [facility] didn't have Capoten on shelf, won't be ready until after 2pm tomorrow, along with the Lipitor. [facility] agreed to try another pharmacy. [facility] has both meds and will be ready for p/u at 130 today. Told Hector to take the dose as soon as he got it and then another dose late tonight. Method of communication with provider : chart routing    Advance Care Planning:   Does patient have an Advance Directive: reviewed and current. Care Transition Nurse contacted the patient by telephone to perform post hospital discharge assessment. Verified name and  with patient as identifiers. Provided introduction to self, and explanation of the CTN role. CTN reviewed discharge instructions, medical action plan and red flags with patient who verbalized understanding. Patient given an opportunity to ask questions and does not have any further questions or concerns at this time. Were discharge instructions available to patient? Yes. Reviewed appropriate site of care based on symptoms and resources available to patient including: PCP  Specialist. The patient agrees to contact the PCP office for questions related to their healthcare. Medication reconciliation was performed with patient, who verbalizes understanding of administration of home medications.  Advised obtaining a 90-day supply of all daily and as-needed medications. Was patient discharged with a pulse oximeter? no    CTN provided contact information. Plan for follow-up call in 1-2 days based on severity of symptoms and risk factors. Plan for next call: symptom management-chest pain, meds picked up?       Follow Up  Future Appointments   Date Time Provider Staci Fermini   6/20/2022  9:30 AM Lisa Velez, APRN - CNP [facility] [place]   6/28/2022 12:40 PM Bin Meza MD Neuro Spec [facility]   6/29/2022 11:00 AM JEWEL GuzmánX [facility] [place]   7/19/2022  8:15 AM Deepika Jordan MD [address] [facility] P - Meryle Budge, RN

## 2022-06-15 NOTE — PROCEDURES
than 50 mL. SUMMARY:  No significant coronary artery disease. PLAN:  1. Bedrest.  2.  Optimal medical therapy. 3.  Risk factor management. 4.  Routine access site care. 5.  Possible microvascular angina, although the flow in the coronaries  was relatively preserved. We would consider alternative causes for the  patient's chest pain. All the above was explained to the patient and the patient's family. They were agreeable and amenable to the plan.         Clarissa Brown MD    D: 06/15/2022 8:00:13       T: 06/15/2022 8:40:31     [email or web address]  Job#: [id number]     Doc#: [id number]    CC:

## 2022-06-15 NOTE — DISCHARGE SUMMARY
DISCHARGE SUMMARY        Patient Identification:   Latisha Torres   : 1970  MRN: [id number]   Account: [de-identified]                 Patient's PCP: Pam Mukherjee MD     Admit Date: 2022      Discharge Date:   2022     Admitting Physician: Melita Mei MD     Discharge Physician: Radha Wade DO      Discharge Diagnoses:  [address] Problems     Diagnosis Date Noted    Hyperlipidemia [E78.5]         Priority: High    Unstable angina ([address] [I20.0]         Priority: Medium    Chest pain [R07.9] 2022       Priority: Medium    Recurrent acute deep vein thrombosis (DVT) of lower extremity (HCC) [I82.409] 2022       Priority: Medium      Chest Pain s/p LHC without need for PCI  Hx of DVTs/PE, no longer takes Pradaxa as this was stopped by his PCP. Hypertension, added Captopril  Hyperlipidemia  Hypokalemia  Hypomagnesemia  Hx of Seizures  Type II DM  Diabetic Neuropathy  GERD  Erectile Dysfunction, hold Viagra for now on D/C due to chest pain. S/P Right foot metatarsal phalangeal joint resection/antibiotic beads and left foot achilles tendon lengthening  Thyroid Cancer s/p partial thyroidectomy  Hx of Anxiety and Depression  Restless Leg Syndrome  Obesity     The patient was seen and examined on day of discharge and this discharge summary is in conjunction with any daily progress note from day of discharge.     Hospital Course:   Latisha Torres is a 46 y.o. male admitted to [address] [place] [address] on 2022 for Chest Pain.         Patient's troponins were negative. Cardiology was consulted and patient underwent heart cath on 2022. He did not require PCI for the heart cath. Patient's otherwise had his  potassium and magnesium replaced. His vitals remained stable and he otherwise had a unremarkable hospital course.     Discussed with patient post cath that he could stay the night since his cath checks would be end late at night. Patientstated that he wanted to go home. Of note, he was told by his PCP to no longer take Pradaxa for acute DVT. This can be discussed in the outpatient setting. Patient is to follow-up with cardiology on discharge. He is to continue taking his statin and low-dose captopril on discharge. Plavix was initiated in the hospital, however it was held on discharge as patient did not require the need for stenting. Patient to follow-up with cardiology which can determine the need for Plavix at that time. Patient stated that he was doing well post cath. He was instructed to follow up with his PCP and Cardiology on discharge. He was discharged in stable condition.         Exam:      Vitals:  Vitals          Vitals:     06/14/22 1745 06/14/22 1800 06/14/22 1815 06/14/22 1830   BP: (!) 152/89 (!) 152/80 137/78 (!) 106/90   Pulse: 82 85 80 80   Resp:           Temp:           TempSrc:           SpO2: 99% 93% 93% 95%   Weight:           Height:                 Weight: Weight: 284 lb 6.3 oz (129 kg)      24 hour intake/output:     Intake/Output Summary (Last 24 hours) at 6/14/2022 1836  Last data filed at 6/14/2022 0859      Gross per 24 hour   Intake --   Output 1375 ml   Net -1375 ml            Physical Exam  Constitutional:       General: He is not in acute distress.     Appearance: Normal appearance. He is obese. He is not ill-appearing. HENT:      Head: Normocephalic and atraumatic.      Nose: Nose normal.   Eyes:      General: No scleral icterus.        Right eye: No discharge.         Left eye: No discharge.      Extraocular Movements: Extraocular movements intact.      Conjunctiva/sclera: Conjunctivae normal.      Pupils: Pupils are equal, round, and reactive to light. Cardiovascular:      Rate and Rhythm: Normal rate and regular rhythm.      Heart sounds: Normal heart sounds. No murmur heard.   No gallop.    Pulmonary:      Effort: Pulmonary effort is normal. No respiratory distress.      Breath sounds: Normal breath sounds. No stridor. No wheezing, rhonchi or rales. Abdominal:      General: Abdomen is flat. There is no distension.      Palpations: Abdomen is soft.      Tenderness: There is no abdominal tenderness. There is no guarding. Musculoskeletal:         General: Normal range of motion.      Cervical back: Normal range of motion and neck supple.      Right lower leg: No edema.      Left lower leg: No edema.      Comments: Left leg in cast. Right foot wrapped.    Neurological:      General: No focal deficit present.      Mental Status: He is alert and oriented to person, place, and time. Psychiatric:         Mood and Affect: Mood normal.         Behavior: Behavior normal.      Labs: For convenience and continuity at follow-up the following most recent labs are provided:        CBC:          Lab Results   Component Value Date     WBC 6.7 06/14/2022     HGB 12.9 06/14/2022     HCT 37.2 06/14/2022      06/14/2022         Renal:          Lab Results   Component Value Date      06/14/2022     K 3.5 06/14/2022     K 3.3 06/13/2022      06/14/2022     CO2 26 06/14/2022     BUN 7 06/14/2022     CREATININE 0.6 06/14/2022     CALCIUM 8.1 06/14/2022     PHOS 3.9 06/13/2022            Significant Diagnostic Studies     Radiology:   CTA CHEST W WO CONTRAST   Final Result   1. No pulmonary emboli.    2. Similar mosaic groundglass veiling in the lungs may reflect areas of    microatelectasis, although an inflammatory or infectious pneumonitis is    not excluded.       This document has been electronically signed by: Cynthia Disla MD on    06/13/2022 07:53 PM       All CTs at this facility use dose modulation techniques and iterative    reconstructions, and/or weight-based dosing   when appropriate to reduce radiation to a low as reasonably achievable.       VL DUP LOWER EXTREMITY VENOUS BILATERAL   Final Result   Negative for bilateral lower extremity deep vein thrombosis.       This document has been electronically signed by: Víctor Engel MD on    06/13/2022 07:15 PM       Technique Used: Duplex examination performed utilizing grayscale, color    and spectral analysis.       XR CHEST PORTABLE   Final Result   Stable left basilar scarring or atelectasis. No other acute findings.       This document has been electronically signed by: Víctor Engel MD on    06/13/2022 05:22 PM                Consults:      IP CONSULT TO CARDIOLOGY     Disposition:      [x]? Home                             []? TCU                             []? Rehab                             []? Psych                             []? SNF                             []? [facility]                             []? Other-     Condition at Discharge: Stable     Code Status:  Full Code      Patient Instructions:    Discharge lab work: N/A  Activity: activity as tolerated  Diet: ADULT DIET;  Regular; Low Fat/Low Chol/High Fiber/KATELYNN       Follow-up visits:   No follow-up provider specified.         Discharge Medications:                Medication List             START taking these medications          aspirin 81 MG chewable tablet  Take 1 tablet by mouth daily  Start taking on: Vivienne 15, 2022      atorvastatin 80 MG tablet  Commonly known as: LIPITOR  Take 1 tablet by mouth nightly      captopril 12.5 MG tablet  Commonly known as: CAPOTEN  Take 0.5 tablets by mouth 2 times daily                      CHANGE how you take these medications          levETIRAcetam 1000 MG tablet  Commonly known as: KEPPRA  Take 2 tablets by mouth 2 times daily  What changed: how much to take                      CONTINUE taking these medications          ARIPiprazole 5 MG tablet  Commonly known as: ABILIFY      Basaglar KwikPen 100 UNIT/ML injection pen  Generic drug: insulin glargine      DULoxetine 60 MG extended release capsule  Commonly known as: CYMBALTA      gabapentin 600 MG tablet  Commonly known as: NEURONTIN      insulin lispro 100 UNIT/ML injection vial  Commonly known as: HUMALOG      Insulin Pen Needle 32G X 4 MM Misc  Use 5 times daily with insulin pens      lacosamide 200 MG tablet  Commonly known as: Vimpat  Take 1 tablet by mouth 2 times daily for 90 days.      LORazepam 0.5 MG tablet  Commonly known as: ATIVAN  Lorazepam Active 0.5 MG PO as needed      metoprolol tartrate 25 MG tablet  Commonly known as: LOPRESSOR  Take 1 tablet by mouth 2 times daily      OneTouch Ultra strip  Generic drug: blood glucose test strips  1 each by In Vitro route 5 times daily DX: E11.65 Dispense Onetouch Ultra 2 test strips      pantoprazole 40 MG tablet  Commonly known as: Protonix  Take 1 tablet by mouth 2 times daily      promethazine 12.5 MG tablet  Commonly known as: PHENERGAN  Take 1-2 tablets by mouth every 8 hours as needed for Nausea      rOPINIRole 1 MG tablet  Commonly known as: REQUIP  TAKE 1 TABLET BY MOUTH THREE TIMES A DAY      sucralfate 1 GM tablet  Commonly known as: CARAFATE      vitamin B-12 1000 MCG tablet  Commonly known as: CYANOCOBALAMIN      vitamin D 1.25 MG (37167 UT) Caps capsule  Commonly known as: ERGOCALCIFEROL  Take 1 capsule by mouth once a week                      STOP taking these medications          dabigatran 150 MG capsule  Commonly known as: Pradaxa      sildenafil 100 MG tablet  Commonly known as: Viagra                          Where to Get Your Medications             These medications were sent to [address] [place] [address] , [address] [address]     Phone: [phone] ·   aspirin 81 MG chewable tablet  · atorvastatin 80 MG tablet  · captopril 12.5 MG tablet            Time Spent on discharge is more than 30 minutes in the examination, evaluation, counseling and review of medications and discharge plan.              Signed:     Thank you Colton Lam MD for the opportunity to be involved in this patient's

## 2022-06-15 NOTE — CARE COORDINATION
6/15/22, 6:57 AM EDT    Patient goals/plan/ treatment preferences discussed by  and . Patient goals/plan/ treatment preferences reviewed with patient/ family. Patient/ family verbalize understanding of discharge plan and are in agreement with goal/plan/treatment preferences. Understanding was demonstrated using the teach back method. AVS provided by RN at time of discharge, which includes all necessary medical information pertaining to the patients current course of illness, treatment, post-discharge goals of care, and treatment preferences. Services At/After Discharge: None       IMM Letter  IMM Letter given to Patient/Family/Significant other/Guardian/POA/by[de-identified] Pt Access  IMM Letter date given[de-identified] 06/13/22  IMM Letter time given[de-identified] 2120     Discharged home. No needs.

## 2022-06-15 NOTE — CARE COORDINATION
Request from 73 Jami Reyez RN.,  Please schedule patient for PCP appt    Patient scheduled on 6/21 @3:30pm with Dr. Green Heading,      Left VM for patient with time and date of appt.     Og Burrows, [address] [facility] Coordination Transition

## 2022-06-17 ENCOUNTER — CARE COORDINATION (OUTPATIENT)
Dept: CASE MANAGEMENT | Age: 52
End: 2022-06-17

## 2022-06-17 NOTE — CARE COORDINATION
Trevor 45 Transitions Follow Up Call    2022    Patient: April Stinson  Patient : 1970   MRN: [id number]  Reason for Admission: Chest pain  Discharge Date: 22 RARS: Readmission Risk Score: 20.2 ( )         Spoke with [facility], said he is still having chest pain. Explained what the response was from [place]. He did get the new meds and is taking. Started to talk about f/u appt on Monday and his wife interrupted, apologized but said he was at a Dr appt. Was not able to ask who. Will f/u next week after he sees Miguelina on Monday. Care Transitions Subsequent and Final Call    Subsequent and Final Calls  Do you have any ongoing symptoms?: Yes  Onset of Patient-reported symptoms: In the past 7 days  Patient-reported symptoms: Chest Pain  Interventions for patient-reported symptoms: Other  Have your medications changed?: No  Do you have any questions related to your medications?: No  Do you currently have any active services?: No  Do you have any needs or concerns that I can assist you with?: No  Identified Barriers: Lack of Education  Care Transitions Interventions     Other Services: Completed (Comment: Gluckeri juarez, Dial A Dietician, and Diabetic Diet Information Mailed)   Diabetes Education: Completed      Other Interventions:            Follow Up  Future Appointments   Date Time Provider Staci Jay   2022  9:30 AM Kb Velez APRN - CNP N [facility] Heart [address]   2022 12:40 PM Maryam Taylor MD Neuro Spec [facility]   2022 11:00 AM Jade Purvis RN [facility] Physic [address]   2022  8:15 AM Andrew Moya MD  [facility] - Yi Marrero RN

## 2022-06-20 DIAGNOSIS — R11.2 NON-INTRACTABLE VOMITING WITH NAUSEA, UNSPECIFIED VOMITING TYPE: ICD-10-CM

## 2022-06-20 RX ORDER — GABAPENTIN 600 MG/1
600 TABLET ORAL 4 TIMES DAILY
Qty: 120 TABLET | Refills: 2 | Status: SHIPPED | OUTPATIENT
Start: 2022-06-20 | End: 2022-08-29 | Stop reason: SDUPTHER

## 2022-06-20 RX ORDER — PROMETHAZINE HYDROCHLORIDE 12.5 MG/1
12.5-25 TABLET ORAL EVERY 8 HOURS PRN
Qty: 60 TABLET | Refills: 1 | Status: SHIPPED | OUTPATIENT
Start: 2022-06-20

## 2022-06-20 NOTE — TELEPHONE ENCOUNTER
Neurontin - check PDMP and no refill since 12/23/21. Em Mackenzie called patient to see why it has been this long to require refill. He temporarily was decreased to BID and has worked back up to 4x a day.

## 2022-06-21 ENCOUNTER — CARE COORDINATION (OUTPATIENT)
Dept: CASE MANAGEMENT | Age: 52
End: 2022-06-21

## 2022-06-21 NOTE — CARE COORDINATION
[facility] 45 Transitions Follow Up Call    2022    Patient: Sofya Headley  Patient : 1970   MRN: [id number]  Reason for Admission: Chest pain  Discharge Date: 22 RARS: Readmission Risk Score: 20.2 ( )         Spoke with Almaz Swain, said he is \"surviving\", still having chest pain, just about everyday, sometimes for 15 min, doesn't have Nitro. Missed his cardio appt yesterday. His wife got called in to work and he doesn't drive. Jeoffrey Mortimer he will reschedule according to his wife's schedule. Will send message to cardio. Discussed returning to ER if chest pain doesn't improve. Aware of f/u appts below. Denies needs. No other questions or concerns at this time. Will continue to follow. Care Transitions Subsequent and Final Call    Subsequent and Final Calls  Do you have any ongoing symptoms?: Yes  Onset of Patient-reported symptoms: In the past 7 days  Patient-reported symptoms: Chest Pain  Interventions for patient-reported symptoms: Notified PCP/Physician  Have your medications changed?: No  Do you have any questions related to your medications?: No  Do you currently have any active services?: No  Do you have any needs or concerns that I can assist you with?: No  Identified Barriers: Lack of Education  Care Transitions Interventions     Other Services: Completed (Comment: Tri juarez, Dial A Dietician, and Diabetic Diet Information Mailed)      Transportation Support: Declined   Diabetes Education: Completed      Other Interventions:       Care Transitions Follow Up Call    Needs to be reviewed by the provider   Additional needs identified to be addressed with provider: Yes  Spoke with Almaz Swain, said he still has chest pain everyday, sometimes for 15 min, radiates to left arm occasionally. Missed his appt yesterday d/t wife called into work and he doesn't drive. Said he would call and reschedule based on his wife's schedule. He is taking his medications as directed.                Method of

## 2022-06-22 ENCOUNTER — CARE COORDINATION (OUTPATIENT)
Dept: CASE MANAGEMENT | Age: 52
End: 2022-06-22

## 2022-06-22 DIAGNOSIS — I25.9 CHEST PAIN DUE TO MYOCARDIAL ISCHEMIA, UNSPECIFIED ISCHEMIC CHEST PAIN TYPE: Primary | ICD-10-CM

## 2022-06-22 RX ORDER — RANOLAZINE 500 MG/1
500 TABLET, EXTENDED RELEASE ORAL 2 TIMES DAILY
Qty: 60 TABLET | Refills: 3 | Status: SHIPPED | OUTPATIENT
Start: 2022-06-22 | End: 2022-06-29

## 2022-06-22 NOTE — CARE COORDINATION
Notified Hector that Miguelina Velez called in a prescription for Ranexa to help with his chronic chest pain. He received a text message that it was ready for , said he will get it today. Reminded him to reschedule appt with cardio.

## 2022-06-22 NOTE — PROGRESS NOTES
Reviewed [facility] report of 6/14/22. Non-obstructive CAD. Possible microvascular disease. Will add Ranexa and re-eval at his follow-up visit. Rx sent to his pharmacy.    Miguelina Velez, NICANOR - CNP

## 2022-06-22 NOTE — TELEPHONE ENCOUNTER
Reviewed his C from 6/14/22. No obstructive CAD. Possible microvascular disease. Start Ranexa 500 BID; sent Rx to his pharmacy. Please let him know. Thank you.   Miguelina Velez, NICANOR - CNP

## 2022-06-24 ENCOUNTER — CARE COORDINATION (OUTPATIENT)
Dept: CASE MANAGEMENT | Age: 52
End: 2022-06-24

## 2022-06-24 NOTE — CARE COORDINATION
Trevor 45 Transitions Follow Up Call    2022    Patient: Fei James  Patient : 1970   MRN: [id number]  Reason for Admission: Chest pain  Discharge Date: 22 RARS: Readmission Risk Score: 20.2 ( )         Spoke with Dhara, said his is doing ok. Still has the chronic chest pain. Started taking the new medication but hasn't helped yet, has only been taking a couple of days. Has not scheduled with cardio but said his wife is home today and will call. Strongly encouraged to see cardio, may have to increase the Ranexa dose or prescribe something else. Denies any other issues. No other questions or concerns at this time. Will continue to follow. Care Transitions Subsequent and Final Call    Subsequent and Final Calls  Do you have any ongoing symptoms?: Yes  Onset of Patient-reported symptoms: Other  Patient-reported symptoms: Chest Pain  Have your medications changed?: No  Do you have any questions related to your medications?: No  Do you currently have any active services?: No  Do you have any needs or concerns that I can assist you with?: No  Identified Barriers: Lack of Education  Care Transitions Interventions     Other Services: Completed (Comment: Glucerfreddie juarez, Dial A Dietician, and Diabetic Diet Information Mailed)      Transportation Support: Declined   Diabetes Education: Completed      Other Interventions:       Care Transitions Follow Up Call    Needs to be reviewed by the provider   Additional needs identified to be addressed with provider: No  none             Method of communication with provider : none      Care Transition Nurse contacted the patient by telephone to follow up after admission on 22. Verified name and  with patient as identifiers. Addressed changes since last contact: none  Discussed follow-up appointments. Advance Care Planning:   Does patient have an Advance Directive: reviewed and current.      CTN reviewed discharge instructions, medical action plan and red flags with patient and discussed any barriers to care and/or understanding of plan of care after discharge. Discussed appropriate site of care based on symptoms and resources available to patient including: PCP  Specialist. The patient agrees to contact the PCP office for questions related to their healthcare. Patients top risk factors for readmission: lack of knowledge about disease  medical condition-CAD DM seizures  polypharmacy  Interventions to address risk factors: Scheduled appointment with PCP-7/19 and Scheduled appointment with Specialist-6/28 6/29      Non-[facility] follow up appointment(s): na    CTN provided contact information for future needs. Plan for follow-up call in 5-7 days based on severity of symptoms and risk factors. Plan for next call: symptom management-chest pain  cardio appt?         Follow Up  Future Appointments   Date Time Provider Staci Jay   6/28/2022 12:40 PM [facility]ulo [facility]   6/29/2022 11:00 AM Twan Leon RN [facility] Physic [facility] Sammi Cano   7/19/2022  8:15 AM Rl Reeves MD [address] [facility] [address] Chipper Bumpers, RN

## 2022-06-28 ENCOUNTER — TELEMEDICINE (OUTPATIENT)
Dept: NEUROLOGY | Age: 52
End: 2022-06-28
Payer: MEDICARE

## 2022-06-28 DIAGNOSIS — G40.909 SEIZURE DISORDER (HCC): Primary | ICD-10-CM

## 2022-06-28 PROCEDURE — 3017F COLORECTAL CA SCREEN DOC REV: CPT | Performed by: PSYCHIATRY & NEUROLOGY

## 2022-06-28 PROCEDURE — 99214 OFFICE O/P EST MOD 30 MIN: CPT | Performed by: PSYCHIATRY & NEUROLOGY

## 2022-06-28 PROCEDURE — G8428 CUR MEDS NOT DOCUMENT: HCPCS | Performed by: PSYCHIATRY & NEUROLOGY

## 2022-06-28 PROCEDURE — 1036F TOBACCO NON-USER: CPT | Performed by: PSYCHIATRY & NEUROLOGY

## 2022-06-28 PROCEDURE — 1111F DSCHRG MED/CURRENT MED MERGE: CPT | Performed by: PSYCHIATRY & NEUROLOGY

## 2022-06-28 PROCEDURE — G8417 CALC BMI ABV UP PARAM F/U: HCPCS | Performed by: PSYCHIATRY & NEUROLOGY

## 2022-06-28 NOTE — PROGRESS NOTES
[facility], [address], [address] [place] [address]  Ph: [phone] or [phone]  FAX: [phone]    Chief Complaint: seizures     Dear Ascencion Fleischer, MD     As you would recall Priti Kirk is a 46 y.o. male. Patient presented to [facility] with my 4/7/22. He was accompanied by his wife. Patient had an arachnoid cyst and has history of right-sided neurosurgery. Patient also has history of seizures, RUE tremor, and recently has a possible focal seizure, for which an EEG was ordered. He recently discontinued Depakote. He had been on Depakote for 5-6 years. Patient states his last seizure was 5 days ago. Before that, he was seizure-free for 2 weeks. Patient's seizures vary. He reports that he does not remember most seizures. Patient admits to aura. Patient explains that his head goes numb and tingles on the left side. Sometimes he experiences eye drooping. He also loses coordination and cannot walk. He states that he falls down during seizures sometimes. Patient typically lays down when he feels an aura, and becomes confused and has hallucinations. His wife reports that the altered mental status will last for up to 6 hours. The patient reports that he also has shorter seizures that last approximately 10 minutes. Patient wife reports he has only had 2 grand mal seizures. His first grand mal seizure was after his brain surgery, and the second one occurred approximately 10 years ago. The patient's wife also reports that he sometimes had seizures wherein his eyes would roll and he would vomit. Patient admitted to incontinence during a few of these episodes as well. Among seizures, patient also complains of episodic tremors. Since discontinuing Depakote, patient's tremors have decreased in frequency. Patient had encephalopathy in July 2021. Patient's wife states he is still recovering, as he is not as functional as he used to be.  Patient has stopped driving previously seeing [facility] cardiologist, now seeing Dr. Tatiana Heredia (LAD bridging on cath 4/2016)    Chronic back pain     Chronic bronchitis ([facility]     Dr. Roberto Rowell Colon polyp 08/30/2016    Depression     seeing Claudean Hilding DVT (deep venous thrombosis) ([facility] [id number]    not on Coumadin due to unable to regulate - Dr. Sheryle Kelly - no etiology found per patient    Esophageal abnormality     nodule     Fatty liver disease, nonalcoholic     [place] 98/0158 [facility]    Follicular adenoma of thyroid gland 01/15/2021    s/p lobectomy    Furuncle     legs    History of Doppler ultrasound 05/29/2011    No hemodynamically significant carotid stenosis is identified. A thyroid nodule on each side. Dedicated ultrasound of thyroid gland is suggested to further evaluate if clinically indicated.  History of pulmonary embolism 2007    s/p GFF, related to knee surgery    Hx of blood clots     leg and lung    Hyperlipidemia     severely elevated triglycerides    Hypertension     diastolic    Intracranial arachnoid cyst 11/2012    Dr. Deandre Howard drained, complicated with seizures, DKA    Irritable bowel syndrome     Liver disease     Ivan Flores - elevated LFT - positive smooth muscle antibody, steatosis per liver bx 3/2014 with Dr. Kunal Finn (multiple drug resistant organisms) resistance 2012    MRSA (methicillin resistant staph aureus) culture positive     h/o in foot and before brain surgery    Nephrolithiasis     noted on CT abdomen 9/2016, 6/2019    Neuropathy     Pancreatic insufficiency     Positive SANDY (antinuclear antibody)     Dr. Tia Reyes - first visit in 6/2013    Prolonged emergence from general anesthesia     Restless legs syndrome     Seizures ([facility]     started with brain surgery    Sinus tachycardia     Holter 3/2013 - seeing Dr. Wicho Bishop fracture [facility])     clips     Sleep apnea     positive sleep apnea per study 10/2020.     Systolic dysfunction     \"systolic bridge\"  EF 93% ECHO 9/2019    Type II or unspecified type diabetes mellitus without mention of complication, not stated as uncontrolled 2007     Past Surgical History:   Procedure Laterality Date    CARDIAC CATHETERIZATION      2011,5-6 years ago   [address]  3-2012    CARDIAC CATHETERIZATION  04/28/2016    [facility]     CARPAL TUNNEL RELEASE  01/2017    CHOLECYSTECTOMY  2004    COLONOSCOPY  2012    COLONOSCOPY  08/2016    2 tubular adenomas - reportedly needs repeat scope in 6 months    CRANIOTOMY  11/2012    arachnoid cyst drainage    EKG 12-LEAD  10/18/2015         ENDOSCOPY, COLON, DIAGNOSTIC      HERNIA REPAIR Right 1996    [facility]--Dr. Lupe Soliz INGUINAL HERNIA REPAIR Right 09/15/2016    Robotic assisted    KNEE ARTHROSCOPY Right 2016    KNEE SURGERY Left 2007    acl and debrided twice    OTHER SURGICAL HISTORY  5-31-11    Tilt table was associated w/ nonspecific symptoms or nausea, otherwise no significant dizziness or syncope. No significant hemodynamic changes. Otherwise, unremarkable tilt table test after 30 minutes of tilting.  OTHER SURGICAL HISTORY  01/20/2017    RIGHT SHOULDER ARTHROSCOPY, OPEN STAN, OPEN ACROMIOPLASTY, BICEP TENDESIS, RIGHT CARPAL TUNNEL RELEASE    SHOULDER SURGERY Right 01/2007    THYROID LOBECTOMY N/A 1/15/2021    THYROID LOBECTOMY, UVULOPALATOPHARYNGOPLAST LAOP performed by Jean Domínguez MD at [address] [place] [address] ECHOCARDIOGRAM  3-05-11    LV size and systolic function normal. EF 55-65%.      UPPER GASTROINTESTINAL ENDOSCOPY  2012    UPPER GASTROINTESTINAL ENDOSCOPY  2016    Dr. Cassidy Mancini Left 10/22/2019    EGD DILATION SAVORY performed by Michelle Holley MD at [address] [place] [address] ENDOSCOPY Left 10/22/2019    EGD BIOPSY performed by Michelle Holley MD at [address] [place] [address] ENDOSCOPY N/A 11/16/2021    EGD performed by Michelle Holley MD at [address] [place] [address] ENDOSCOPY Left 11/16/2021    EGD BIOPSY performed by Adeola Leal MD at [address]  2007     Allergies   Allergen Reactions    Ciprofloxacin-Ciproflox Hcl Er Other (See Comments)     Elevated creatinine    Heparin      Monitor for thrombocytopenia    Metformin Nausea Only     Abdominal pain, diarrhea    Niacin And Related Other (See Comments)     Burning sensation, severe flushing    Tramadol Hcl      Contraindication to seizure medications    Ultram [Tramadol]      Contraindication to seizure medications    Warfarin      Very difficult to regulate in past    Other      Other reaction(s): very difficult to regulate     Family History   Problem Relation Age of Onset    Heart Disease Father 61        MI    Stroke Brother     Obesity Brother     Arthritis Mother     Seizures Mother     Obesity Sister     Other Brother         pulmonary embolism    Diabetes Daughter     Seizures Brother       Social History     Socioeconomic History    Marital status:      Spouse name: Not on file    Number of children: 3    Years of education: Not on file    Highest education level: Not on file   Occupational History    Occupation: Disability since 2011   Tobacco Use    Smoking status: Never Smoker    Smokeless tobacco: Never Used   Vaping Use    Vaping Use: Never used   Substance and Sexual Activity    Alcohol use: No     Alcohol/week: 0.0 standard drinks    Drug use: No    Sexual activity: Not on file   Other Topics Concern    Not on file   Social History Narrative    Not on file     Social Determinants of Health     Financial Resource Strain: Medium Risk    Difficulty of Paying Living Expenses: Somewhat hard   Food Insecurity: No Food Insecurity    Worried About Running Out of Food in the Last Year: Never true    [address] [place] [address] Place of Food in the Last Year: Never true   Transportation Needs:     Lack of Transportation (Medical):  Not on file    Lack of Transportation (Non-Medical): Not on file   Physical Activity:     Days of Exercise per Week: Not on file    Minutes of Exercise per Session: Not on file   Stress:     Feeling of Stress : Not on file   Social Connections:     Frequency of Communication with Friends and Family: Not on file    Frequency of Social Gatherings with Friends and Family: Not on file    Attends Evangelical Services: Not on file    Active Member of [address] [place] [address] or Organizations: Not on file    Attends Club or Organization Meetings: Not on file    Marital Status: Not on file   Intimate Partner Violence:     Fear of Current or Ex-Partner: Not on file    Emotionally Abused: Not on file    Physically Abused: Not on file    Sexually Abused: Not on file   Housing Stability:     Unable to Pay for Housing in the Last Year: Not on file    Number of [place] in the Last Year: Not on file    Unstable Housing in the Last Year: Not on file      There were no vitals taken for this visit.      HEENT [] Hearing Loss  [] Visual Disturbance  [] Tinnitus  [] Eye pain   Respiratory [] Shortness of Breath  [] Cough  [] Snoring   Cardiovascular [] Chest Pain  [] Palpitations  [] Lightheaded   GI [] Constipation  [] Diarrhea  [] Swallowing change  [] Nausea/vomiting    [] Urinary Frequency  [] Urinary Urgency   Musculoskeletal [] Neck pain  [] Back pain  [] Muscle pain  [] Restless legs   Dermatologic [] Skin changes   Neurologic [] Memory loss/confusion  [x] Seizures  [] Trouble walking or imbalance  [] Dizziness  [] Sleep disturbance  [] Weakness  [] Numbness  [x] Tremors  [] Speech Difficulty  [] Headaches  [] Light Sensitivity  [] Sound Sensitivity   Endocrinology []Excessive thirst  []Excessive hunger   Psychiatric [] Anxiety/Depression  [] Hallucination   Allergy/immunology []Hives/environmental allergies   Hematologic/lymph [] Abnormal bleeding  [] Abnormal bruising     General examination:    Head: Normocephalic, atraumatic  Eyes: Extraocular p.o. nightly, Vimpat 200 mg twice a day, Keppra 2 g twice daily. Patient is having no issues so we will keep the medications constant. All medication side effects were discussed and questions answered. Patient to follow up in 6 months or sooner if symptoms worsen. Scribe Attestation:   By signing my name below, I, Audrey Cyril, attest that this documentation has been prepared under the direction and in the presence of Nicolas Arguello MD.    Electronically Signed: Audrey Nam. 06/28/22. 12:21 PM      This is a telehealth visit that was performed with the originating site at Patient Location: Patient Home and Provider Location of [facility], [facility]. Verbal consent to participate in video visit was obtained. Pursuant to the emergency declaration under the [address] [place] [address], [facility] waiver authority and the [employer] and [employer] Act, this Virtual Visit was conducted, with patient's consent, to reduce the patient's risk of exposure to COVID-19 and provide continuity of care for an established/new patient. Services were provided through a video synchronous discussion virtually to substitute for in-person clinic visit. I discussed with the patient the nature of our telehealth visits via interactive/real-time audio/video that:  - I would evaluate the patient and recommend diagnostics and treatments based on my assessment  - Our sessions are not being recorded and that personal health information is protected  - Our team would provide follow up care in person if/when the patient needs it. Zahraa Barbour MD, personally performed the services described in this documentation. All medical record entries made by the scribe were at my direction and in my presence. I have reviewed the chart and discharge instructions (if applicable) and agree that the record reflects my personal performance and is accurate and complete.     Electronically Signed: TASNEEM KING. 7/11/2022 12:09 AM    Diplomate, American Board of Psychiatry and Neurology  Diplomate, American Board of Clinical Neurophysiology  Diplomate, American Board of Epilepsy

## 2022-06-29 ENCOUNTER — OFFICE VISIT (OUTPATIENT)
Dept: INTERNAL MEDICINE CLINIC | Age: 52
End: 2022-06-29
Payer: MEDICARE

## 2022-06-29 ENCOUNTER — CARE COORDINATION (OUTPATIENT)
Dept: CASE MANAGEMENT | Age: 52
End: 2022-06-29

## 2022-06-29 VITALS
BODY MASS INDEX: 34.65 KG/M2 | DIASTOLIC BLOOD PRESSURE: 82 MMHG | HEIGHT: 74 IN | HEART RATE: 90 BPM | TEMPERATURE: 97.9 F | SYSTOLIC BLOOD PRESSURE: 128 MMHG | WEIGHT: 270 LBS

## 2022-06-29 DIAGNOSIS — E11.40 TYPE 2 DIABETES MELLITUS WITH DIABETIC NEUROPATHY, WITH LONG-TERM CURRENT USE OF INSULIN (HCC): ICD-10-CM

## 2022-06-29 DIAGNOSIS — Z79.4 TYPE 2 DIABETES MELLITUS WITH DIABETIC NEUROPATHY, WITH LONG-TERM CURRENT USE OF INSULIN (HCC): ICD-10-CM

## 2022-06-29 PROCEDURE — G0108 DIAB MANAGE TRN  PER INDIV: HCPCS | Performed by: INTERNAL MEDICINE

## 2022-06-29 RX ORDER — MIRTAZAPINE 15 MG/1
15 TABLET, FILM COATED ORAL NIGHTLY
COMMUNITY
Start: 2022-06-17 | End: 2022-08-31 | Stop reason: ALTCHOICE

## 2022-06-29 RX ORDER — ARIPIPRAZOLE 10 MG/1
10 TABLET ORAL NIGHTLY
COMMUNITY
Start: 2022-06-17 | End: 2022-08-16 | Stop reason: DRUGHIGH

## 2022-06-29 NOTE — PROGRESS NOTES
Diabetes Mellitus Type II, Initial Visit:   Dr. Silvina Paige  Patient here for an initial evaluation of Type 2 diabetes mellitus. Current symptoms/problems include foot ulcerations and have been improving. Symptoms have been present for months. The patient was initially diagnosed with Type 2 diabetes mellitus 2004. Known diabetic complications: autonomic neuropathy, peripheral neuropathy and peripheral vascular disease  Cardiovascular risk factors: diabetes mellitus, dyslipidemia, hypertension, male gender, obesity (BMI >= 30 kg/m2) and sedentary lifestyle  Current diabetic medications include Basaglar 60 units am and pm and Humalog 30-40units (usu 40 units). Eye exam current (within one year): no 1.5 years ago--Dr. Carter Barrera. No history  Foot: healing ulcer right foot. Charcot foot left foot-casted s/p stretching of achilles tendon. Follow up appt Dr. Dejuan Jenkins 7/6/22. Last foot exam 6/17/22  Weight trend: stable  Prior visit with dietician: yes - remomte  Current diet: B-none                   L-none                   Random snacks -pb crackers 1-2 per day                              Fruit -oranges/ grapes 2 handfuls                           2 slice cheese/ 4-5 summer sausage                   D 6pm 2 egg rolls (frozen)                  Snack during the night                   Drinks 3-4 cokes  Current exercise: ADL's; limited d/t injuries in feet bilat    Current monitoring regimen: home blood tests - 4 times daily  Home blood sugar records: FBS's [phone]                                               Tristen Pipes [phone]                                                Dinn er [phone]                                                -130  Any episodes of hypoglycemia? no    Is He on ACE inhibitor or angiotensin II receptor blocker? No     Focus  Initial visit for Diabetes education. A1C from 6/7/22 7.7%; however glucose levels obtained are well over 200-300 consistently.  Wali Cuba is currently healing from an ulcer

## 2022-06-29 NOTE — PATIENT INSTRUCTIONS
We will request the Dexcom CGM  Set a goal. For 1 week:  No regular coke. Try Dr. Jesus Alvarez 0, unsweetened                 koolaid with your favorite sweetener or propel 0, etc         Try to learn to like the Dr. Jesus Ding as a permanent replacement                 To your regular coke  Set a goal to exercise for 10-20 minutes right after supper                Stretch bands and small weights --sitting in the chair-- upper body                   Workout. Do repetition to try to get your heart rate up some  Keep checking your blood sugars. Good job!

## 2022-06-29 NOTE — CARE COORDINATION
Trevor [facility] Transitions Follow Up Call    2022    Patient: Mattie Krishnamurthy  Patient : 1970   MRN: [id number]  Reason for Admission: Chest pain  Discharge Date: 22 RARS: Readmission Risk Score: 20.2 ( )         Spoke with Dhara, said he is doing ok. Said the chest pain as subsided. Saw DM educator today and discontinued Ranexa. Dhara said he never started it but last week said he did. Still no f/u with cardio but said he would call today. Denies needs. No other questions or concerns at this time. Will continue to follow. Care Transitions Subsequent and Final Call    Subsequent and Final Calls  Do you have any ongoing symptoms?: No  Have your medications changed?: Yes  Patient Reports: Bam Houser stopped, never started it  Do you have any questions related to your medications?: No  Do you currently have any active services?: No  Do you have any needs or concerns that I can assist you with?: No  Identified Barriers: Lack of Education  Care Transitions Interventions     Other Services: Completed (Comment: Tri juarez, Dial A Dietician, and Diabetic Diet Information Mailed)      Transportation Support: Declined   Diabetes Education: Completed      Other Interventions:       Care Transitions Follow Up Call    Needs to be reviewed by the provider   Additional needs identified to be addressed with provider: No  none             Method of communication with provider : none      Care Transition Nurse contacted the patient by telephone to follow up after admission on 22. Verified name and  with patient as identifiers. Addressed changes since last contact: none  Discussed follow-up appointments. Advance Care Planning:   Does patient have an Advance Directive: reviewed and current. CTN reviewed discharge instructions, medical action plan and red flags with patient and discussed any barriers to care and/or understanding of plan of care after discharge.  Discussed appropriate site of care based on symptoms and resources available to patient including: PCP  Specialist. The patient agrees to contact the PCP office for questions related to their healthcare. Patients top risk factors for readmission: lack of knowledge about disease  medical condition-CAD, DM, seizures  polypharmacy  Interventions to address risk factors: Scheduled appointment with PCP-7/19 and Scheduled appointment with Troy Isbell      Non-[facility] follow up appointment(s): na    CTN provided contact information for future needs. Plan for follow-up call in 7-10 days based on severity of symptoms and risk factors.   Plan for next call: symptom management-chest pain        Follow Up  Future Appointments   Date Time Provider Staci Jay   7/6/2022  8:00 AM Aura Calvillo, [facility] Physic [facility] - Sherren Rosenthal   7/19/2022  8:15 AM [facility] [place]   12/28/2022  1:40 PM Michaelle Aguilar MD Neuro Spec [facility] [[email or web address]]       Kelsie Camargo RN

## 2022-07-04 RX ORDER — ROPINIROLE 1 MG/1
TABLET, FILM COATED ORAL
Qty: 270 TABLET | Refills: 1 | Status: SHIPPED | OUTPATIENT
Start: 2022-07-04

## 2022-07-06 ENCOUNTER — PHARMACY VISIT (OUTPATIENT)
Dept: INTERNAL MEDICINE CLINIC | Age: 52
End: 2022-07-06
Payer: MEDICARE

## 2022-07-06 DIAGNOSIS — Z79.4 TYPE 2 DIABETES MELLITUS WITH DIABETIC NEUROPATHY, WITH LONG-TERM CURRENT USE OF INSULIN (HCC): Primary | ICD-10-CM

## 2022-07-06 DIAGNOSIS — Z79.4 TYPE 2 DIABETES MELLITUS WITH DIABETIC POLYNEUROPATHY, WITH LONG-TERM CURRENT USE OF INSULIN (HCC): ICD-10-CM

## 2022-07-06 DIAGNOSIS — Z79.4 TYPE 2 DIABETES MELLITUS WITH HYPERGLYCEMIA, WITH LONG-TERM CURRENT USE OF INSULIN (HCC): ICD-10-CM

## 2022-07-06 DIAGNOSIS — E11.40 TYPE 2 DIABETES MELLITUS WITH DIABETIC NEUROPATHY, WITH LONG-TERM CURRENT USE OF INSULIN (HCC): Primary | ICD-10-CM

## 2022-07-06 DIAGNOSIS — E11.65 TYPE 2 DIABETES MELLITUS WITH HYPERGLYCEMIA, WITH LONG-TERM CURRENT USE OF INSULIN (HCC): ICD-10-CM

## 2022-07-06 DIAGNOSIS — E11.42 TYPE 2 DIABETES MELLITUS WITH DIABETIC POLYNEUROPATHY, WITH LONG-TERM CURRENT USE OF INSULIN (HCC): ICD-10-CM

## 2022-07-06 PROCEDURE — G0108 DIAB MANAGE TRN  PER INDIV: HCPCS | Performed by: INTERNAL MEDICINE

## 2022-07-06 NOTE — PATIENT INSTRUCTIONS
1. Try to get your fasting c-peptide lab work done sometime this week- this will assist with pump approval   -We will send the pump order for the Tandem pump to [facility] (where you received your Dexcom)    2.  Increase your Basaglar to 65 units two times daily   -If your blood sugars don't decrease into the 200s after 5 days, increase further to Basaglar 70 units two times daily

## 2022-07-06 NOTE — PROGRESS NOTES
[facility]  [address] [facility], [address]  [phone] (phone)  [phone] (fax)    Patient ID: Arelis Landon 1970  Referring Provider: Dr. Jim Mcfadden    Patient's name and  were verified. Subjective:    He presents for His follow-up diabetic visit. He has type 2 diabetes mellitus. Home regimen includes: Insulin He is compliant most of the time. Assessment:     Lab Results   Component Value Date/Time    LABA1C 7.7 2022 09:08 AM    LABA1C 7.4 2021 02:18 AM    BUN 7 2022 06:00 AM    CREATININE 0.6 2022 06:00 AM     There were no vitals filed for this visit. Wt Readings from Last 3 Encounters:   22 270 lb (122.5 kg)   22 284 lb 6.3 oz (129 kg)   22 270 lb (122.5 kg)     Ht Readings from Last 3 Encounters:   22 6' 2\" (1.88 m)   22 6' 2\" (1.88 m)   22 6' 2\" (1.88 m)       Diabetes Pharmacotherapy:  Basaglar 60 BID  Humalog 20-40 units TID before meals     Glucose Trends:   Fasting glucose today resulted at 447 mg/dl  Current monitoring regimen: Fingerstick blood tests   Home blood sugar trends: recall; no download available   -323-Hi  Any episodes of hypoglycemia? no    Lifestyle Factors: did not review in depth    Focus:   Diabetes Education. Last A1C: 7.7% (22). Per patient, recent glucose readings have been very elevated recently, for unknown cause. Discussed & ruled -out causes of hyperglycemia- stress, infection, denatured insulin. Belle Goetz is still healing from a foot ulcer and is less physically active; however, glucose readings have been worsening, not improving as Hector continues to heal. We will uptitrate insulin and continue to follow closely. Reviewed hyperglycemia management. Belle Goetz presents today with a new Dexcom device; no readings on device yet. We extensively discussed  insulin pump therapy today. Hector expressed a preference for the Tandem pump (with U-200 insulin).  We will submit an order through [facility] and obtain labwork for approval.   Curriculum Area Focus: Hyperglycemia management and Diabetes Technology  DSME PLAN:       1. Try to get your fasting c-peptide lab work done sometime this week- this will assist with pump approval   -We will send the pump order for the Tandem pump to [address] [place], [place] [address] (where you received your Dexcom)    2. Increase your Basaglar to 65 units two times daily   -If your blood sugars don't decrease into the 200s after 5 days, increase further to Basaglar 70 units two times daily         Goals Addressed                   This Visit's Progress     check blood sugars 4 times per day   On track     HEMOGLOBIN A1C < 7.0                Meter download, medications, PMH and nursing assessment reviewed. Priti Kirk states He is willing to participate in this plan of care and verbalized understanding of all instructions provided. Teach back used to verify comprehension. Follow-up: 1 week phone call/download, 2 week PCP, 1 month DM Clinic RN    Total time involved in direct patient education: 60 minutes.      For [address] [place] [address] in place:  Yes  Recommendation Provided To: Patient/Caregiver: 2 via In person  Intervention Detail: Dose Adjustment: 2, reason: Therapy Optimization and Lab(s) Ordered  Gap Closed?: No   Intervention Accepted By: Patient/Caregiver: 2  Time Spent (min): [address], PharmD, [facility]  Internal Medicine Clinical Pharmacist  [phone]

## 2022-07-07 ENCOUNTER — CARE COORDINATION (OUTPATIENT)
Dept: CASE MANAGEMENT | Age: 52
End: 2022-07-07

## 2022-07-07 NOTE — CARE COORDINATION
Care Transitions Outreach Attempt-1st attempt    Call within 2 business days of discharge: Yes   Attempted to reach patient for subsequent transitional call.  left to return call to [phone]. Patient: Griselda Graham Patient : 1970 MRN: [id number]    Last Discharge [facility]       Complaint Diagnosis Description Type Department Provider    22 Chest Pain Chest pain, unspecified type ED to Hosp-Admission (Discharged) (ADMITTED) [facility] 3B Js Doran MD; Daneil Grade. ..           Noted following upcoming appointments from discharge chart review:   [facility] follow up appointment(s):   Future Appointments   Date Time Provider Staci Fermini   2022  8:15 AM Melva Severe, MD [address] [place] [address]   2022  2:00 PM Frederick Bowen RN [facility] Physic [facility]   2022  1:40 PM Freddy Phillips MD Neuro Spec [facility]     Non-[facility] follow up appointment(s): na

## 2022-07-12 ENCOUNTER — CARE COORDINATION (OUTPATIENT)
Dept: CASE MANAGEMENT | Age: 52
End: 2022-07-12

## 2022-07-12 ENCOUNTER — NURSE ONLY (OUTPATIENT)
Dept: LAB | Age: 52
End: 2022-07-12

## 2022-07-12 DIAGNOSIS — Z79.4 TYPE 2 DIABETES MELLITUS WITH DIABETIC NEUROPATHY, WITH LONG-TERM CURRENT USE OF INSULIN (HCC): ICD-10-CM

## 2022-07-12 DIAGNOSIS — E11.42 TYPE 2 DIABETES MELLITUS WITH DIABETIC POLYNEUROPATHY, WITH LONG-TERM CURRENT USE OF INSULIN (HCC): ICD-10-CM

## 2022-07-12 DIAGNOSIS — Z79.4 TYPE 2 DIABETES MELLITUS WITH HYPERGLYCEMIA, WITH LONG-TERM CURRENT USE OF INSULIN (HCC): ICD-10-CM

## 2022-07-12 DIAGNOSIS — E11.40 TYPE 2 DIABETES MELLITUS WITH DIABETIC NEUROPATHY, WITH LONG-TERM CURRENT USE OF INSULIN (HCC): ICD-10-CM

## 2022-07-12 DIAGNOSIS — Z79.4 TYPE 2 DIABETES MELLITUS WITH DIABETIC POLYNEUROPATHY, WITH LONG-TERM CURRENT USE OF INSULIN (HCC): ICD-10-CM

## 2022-07-12 DIAGNOSIS — E11.65 TYPE 2 DIABETES MELLITUS WITH HYPERGLYCEMIA, WITH LONG-TERM CURRENT USE OF INSULIN (HCC): ICD-10-CM

## 2022-07-12 LAB — GLUCOSE FASTING: 282 MG/DL (ref 70–108)

## 2022-07-12 NOTE — CARE COORDINATION
Care Transitions Outreach Attempt-2nd attempt    Call within 2 business days of discharge: Yes   Attempted to reach patient for subsequent transitional call. VM left to return call to [phone]. If no return call, CTN will sign off-2nd attempt. Unable to reach letter not sent d/t end of episode. Patient: Divina Ordaz Patient : 1970 MRN: [id number]    Last Discharge [facility]       Complaint Diagnosis Description Type Department Provider    22 Chest Pain Chest pain, unspecified type ED to Hosp-Admission (Discharged) (ADMITTED) STACE[facility] 3B Juliana Pickens MD; Homar Gusman. ..             Noted following upcoming appointments from discharge chart review:   [facility] follow up appointment(s):   Future Appointments   Date Time Provider Staci Jay   2022  8:15 AM Canid Cota MD [facility]   2022  2:00 PM Gia Patricio RN [facility] Physic [facility] - [facility]   2022  1:40 PM Nicolas Arguello MD Neuro Spec [facility]     Non-[facility] follow up appointment(s): na

## 2022-07-13 ENCOUNTER — TELEPHONE (OUTPATIENT)
Dept: INTERNAL MEDICINE CLINIC | Age: 52
End: 2022-07-13

## 2022-07-13 LAB — C-PEPTIDE: 2 NG/ML (ref 1.1–4.4)

## 2022-07-13 RX ORDER — INSULIN GLARGINE 300 U/ML
INJECTION, SOLUTION SUBCUTANEOUS
Qty: 15 ML | Refills: 1 | Status: SHIPPED | OUTPATIENT
Start: 2022-07-13 | End: 2022-08-16 | Stop reason: ALTCHOICE

## 2022-07-13 NOTE — TELEPHONE ENCOUNTER
Connected with Hector's Dexcom account. Reviewed Dexcom CGM download(s). -Trends: V.  High: 92%, High: 8%, Target: 0%, Low:0%, V. Low: 0%   -Average Bmg/dL    -Spoke with Hector and verified   -insulin not  & has been properly stored   -adherence to prescribed dosing (currently taking Lantus 70 units BID)   -no s/sx DKA - denies nausea, vomiting   -adequate hydration    Recommendations/Plan:   -Increase to 75 units BID, switch to concentrated insulin, if possible (obtains insulin through [facility])  -Follow-up in 1 week with Dr. Keiko Sosa  -Report to the ER if s/sx of DKA develop    For Pharmacy [address] [facility] in place:  Yes  Recommendation Provided To: Patient/Caregiver: 1 via In person  Intervention Detail: Dose Adjustment: 1, reason: Therapy Optimization  Gap Closed?: No   Intervention Accepted By: Patient/Caregiver: 1  Time Spent (min): [address], PharmD, [facility]  Internal Medicine Clinical Pharmacist  [phone]

## 2022-07-20 ENCOUNTER — CARE COORDINATION (OUTPATIENT)
Dept: CARE COORDINATION | Age: 52
End: 2022-07-20

## 2022-07-25 ENCOUNTER — TELEPHONE (OUTPATIENT)
Dept: INTERNAL MEDICINE CLINIC | Age: 52
End: 2022-07-25

## 2022-07-25 NOTE — TELEPHONE ENCOUNTER
Tandem pump will not be covered without c-peptide < 1.1 or positive beta-cell antibodies, per insurance. We could consider reattempting c-peptide test when sugars are better controlled. Reviewed Dexcom CGM download(s). -Trends: V. High: 52%, High: 35%, Target: 13%, Low:0%, V. Low: 0%   -Average B mg/dL    -Glucose mildly improved from download last week; however, still very elevated   -No episodes of lows or near lows    Pharmacotherapy:  Basaglar 75 BID  Humalog 20-40 units TID before meals     Recommendations/Plan:   -Increase Humalog by 5 units with each meal  -Increase to Basaglar 80 units BID   -Consider conversion to Toujeo or Tresiba (through [facility])  -Order c-peptide to be obtained once greater control is gained      Case discussed with Dr. Jose Rafael Cedillo, who agreed with the above plan. Follow-up in 2 weeks with Dat Manrique RN.      For [address] [facility] in place:  Yes  Recommendation Provided To: Provider: 1 via Verbally to provider and Patient/Caregiver: 1 via Telephone  Intervention Detail: Dose Adjustment: 1, reason: Therapy Optimization and Patient Access Assistance/Sample Provided  Gap Closed?: No   Intervention Accepted By: Provider: 1 and Patient/Caregiver: 1  Time Spent (min): [address], PharmD, [facility]  Internal Medicine Clinical Pharmacist  [phone]

## 2022-07-27 ENCOUNTER — CARE COORDINATION (OUTPATIENT)
Dept: CARE COORDINATION | Age: 52
End: 2022-07-27

## 2022-07-27 NOTE — CARE COORDINATION
I sent the provider their portion of the application for assistance on Toujeo, patient was getting assistance on Basaglar but it was not working for him.           [facility]   Medication Assistance  [address] [place] [address], and [place]    (V) [phone]  (V) [phone]

## 2022-08-05 ENCOUNTER — TELEPHONE (OUTPATIENT)
Dept: INTERNAL MEDICINE CLINIC | Age: 52
End: 2022-08-05

## 2022-08-05 ENCOUNTER — CARE COORDINATION (OUTPATIENT)
Dept: CARE COORDINATION | Age: 52
End: 2022-08-05

## 2022-08-05 NOTE — TELEPHONE ENCOUNTER
Patient is calling in to discuss back pain. Patient is experiencing severe back pain. He can not stand for more than 1 or 2 minutes. Patient was informed to go to the ER if this worsens or even continues today. He agreed to monitor it because he feels he does not need to go to the ER yet, however he will go if it continues. I will get him scheduled for next week in the IM residency clinic.

## 2022-08-05 NOTE — CARE COORDINATION
Mailing the patient his portion of the application for assistance on [facility] [employer].         [address] [place] [address]   Medication Assistance  [facility], and [employer]    O) [phone] (q) [phone]

## 2022-08-09 ENCOUNTER — OFFICE VISIT (OUTPATIENT)
Dept: INTERNAL MEDICINE CLINIC | Age: 52
End: 2022-08-09

## 2022-08-09 VITALS
OXYGEN SATURATION: 99 % | BODY MASS INDEX: 33.38 KG/M2 | WEIGHT: 260 LBS | DIASTOLIC BLOOD PRESSURE: 92 MMHG | TEMPERATURE: 98.5 F | HEART RATE: 92 BPM | SYSTOLIC BLOOD PRESSURE: 154 MMHG

## 2022-08-09 DIAGNOSIS — M54.50 BILATERAL LOW BACK PAIN WITHOUT SCIATICA, UNSPECIFIED CHRONICITY: Primary | ICD-10-CM

## 2022-08-09 DIAGNOSIS — G44.89 OTHER HEADACHE SYNDROME: ICD-10-CM

## 2022-08-09 DIAGNOSIS — E11.65 TYPE 2 DIABETES MELLITUS WITH HYPERGLYCEMIA, WITH LONG-TERM CURRENT USE OF INSULIN (HCC): ICD-10-CM

## 2022-08-09 DIAGNOSIS — I10 ESSENTIAL HYPERTENSION: ICD-10-CM

## 2022-08-09 DIAGNOSIS — R00.0 SINUS TACHYCARDIA: ICD-10-CM

## 2022-08-09 DIAGNOSIS — Z79.4 TYPE 2 DIABETES MELLITUS WITH HYPERGLYCEMIA, WITH LONG-TERM CURRENT USE OF INSULIN (HCC): ICD-10-CM

## 2022-08-09 RX ORDER — DOXEPIN HYDROCHLORIDE 25 MG/1
CAPSULE ORAL
COMMUNITY
Start: 2022-07-29 | End: 2022-08-31 | Stop reason: ALTCHOICE

## 2022-08-09 SDOH — ECONOMIC STABILITY: FOOD INSECURITY: WITHIN THE PAST 12 MONTHS, THE FOOD YOU BOUGHT JUST DIDN'T LAST AND YOU DIDN'T HAVE MONEY TO GET MORE.: NEVER TRUE

## 2022-08-09 SDOH — ECONOMIC STABILITY: FOOD INSECURITY: WITHIN THE PAST 12 MONTHS, YOU WORRIED THAT YOUR FOOD WOULD RUN OUT BEFORE YOU GOT MONEY TO BUY MORE.: NEVER TRUE

## 2022-08-09 ASSESSMENT — ENCOUNTER SYMPTOMS
PHOTOPHOBIA: 0
NAUSEA: 0
SHORTNESS OF BREATH: 0
VOMITING: 0
CONSTIPATION: 0
ABDOMINAL PAIN: 0
BLOOD IN STOOL: 0
SORE THROAT: 0
SINUS PAIN: 0
DIARRHEA: 0
SINUS PRESSURE: 0
COUGH: 0

## 2022-08-09 ASSESSMENT — SOCIAL DETERMINANTS OF HEALTH (SDOH): HOW HARD IS IT FOR YOU TO PAY FOR THE VERY BASICS LIKE FOOD, HOUSING, MEDICAL CARE, AND HEATING?: NOT HARD AT ALL

## 2022-08-09 NOTE — PROGRESS NOTES
Carmen Farrar (:  1970) is a 46 y.o. male,Established patient, here for evaluation of the following chief complaint(s):  Back Pain         ASSESSMENT/PLAN:   Diagnosis Orders   1. Bilateral low back pain without sciatica, unspecified chronicity  CT LUMBAR SPINE WO CONTRAST    C-Reactive Protein    Sedimentation Rate    CBC with Auto Differential      2. Other headache syndrome  CT HEAD WO CONTRAST    metoprolol tartrate (LOPRESSOR) 25 MG tablet      3. Essential hypertension  metoprolol tartrate (LOPRESSOR) 25 MG tablet      4. Sinus tachycardia  metoprolol tartrate (LOPRESSOR) 25 MG tablet      5. Type II diabetes mellitus with manifestations, uncontrolled (HCC)          Back pain- unclear etiology.   -Could be worsening of previously noted pars interarticularis fracture without dislocation.  -Obtain repeat CT lumbar spine. Low suspicion for epidural abscess due to lack of systemic symptoms/signs but will check CBC with CRP/ESR. Headache with lightheadedness: unclear etiology. ?due to uncontrolled DM with hyperglycemia. In the setting of previous history of arachnoid cysts, will repeat CT head wo contrast.     HTN: uncontrolled. Increase Lopressor from 25 mg to 50 mg daily. Advised to check BP daily or minimum 2-3x weekly. Keep a log and bring to next visit. IDDMII: will need to be followed up closely with DM clinic. Continues to have uncontrolled BG range in 200s but improved from prior. Return for follow up for more adjustments. Patient seen and plan discussed with Dr. Jamaal Arthur. Return in about 2 weeks (around 2022), or if symptoms worsen or fail to improve. Subjective   SUBJECTIVE/OBJECTIVE:  45 yo M with extensive history including IDDMII with significant BLE neuropathy, HTN, DVT, seizure disorder, and remote history of arachnoid cyst drainage. Here today for evaluation of subacute low back pain. Reports back pain started 1 month ago spontaneously.  Worse with standing 10/10. Rated 3/10 when sitting down. Reports no trauma. Has no other associated symptoms including radiation/sciatica, paresthesia (acute), weakness, fever, chills, nausea, vomiting, abdominal pain, flank pain. Pain intensity has been the same the entire time but he is unable to tolerate standing up more a minute. Last CT lumbar 06/22/2019 showed right L5-S1 pars interarticularis fracture without dislocation as well as degenerative changes of the lumbar spine. Of note, patient had surgeries in both feet in 05/2022- left foot for charcot's foot and right foot due to diabetic wound. Currently NWB on both feet. Mostly wheelchair bound/immobile. Headache/Lightheadedness: Reports bi-frontal headache (unable to describe) started ~1 week ago, varies in degree but has been persistent with associated lightheadedness. No associated symptoms of nausea, vision changes, syncope, facial paresthesia, confusion. Has history of arachnoid cyst drainage 2012 but this headache is different in nature. HTN: uncontrolled. On Lopressor 25 mg daily. Checks BP every 2 weeks. Reports BP ranging 150s/100s at home. IDDMII: uncontrolled. Last Hb A1c 7.7 in 06/07/2022. C-peptide 2.0 on 07/12/2022. BG ranging in 200s, 24% time in target range. On Solostar 80u BID with Humalog 25u TIDWM with carb count plus sliding scale. Review of Systems   Constitutional:  Negative for chills, diaphoresis and fatigue. HENT:  Negative for congestion, ear discharge, ear pain, sinus pressure, sinus pain, sneezing and sore throat. Eyes:  Negative for photophobia and visual disturbance. Respiratory:  Negative for cough and shortness of breath. Cardiovascular:  Negative for chest pain, palpitations and leg swelling. Gastrointestinal:  Negative for abdominal pain, blood in stool, constipation, diarrhea, nausea and vomiting. Endocrine: Negative for polyuria. Genitourinary:  Negative for dysuria and hematuria.   Musculoskeletal:  Negative for neck pain and neck stiffness.   Skin:  Negative for rash and wound.   Neurological:  Positive for dizziness, light-headedness and headaches. Negative for seizures and syncope.   All other systems reviewed and are negative.       Objective   BP (!) 154/92 (Site: Left Upper Arm)   Pulse 92   Temp 98.5 °F (36.9 °C)   Wt 260 lb (117.9 kg)   SpO2 99%   BMI 33.38 kg/m²     Physical Exam  Constitutional:       Appearance: Normal appearance.   HENT:      Head: Normocephalic and atraumatic.      Comments: No major change in lightheadedness/headache with bending forward- felt more pressure.     Nose: Nose normal.      Mouth/Throat:      Mouth: Mucous membranes are moist.      Pharynx: Oropharynx is clear.   Eyes:      Extraocular Movements: Extraocular movements intact.   Musculoskeletal:         General: No swelling or tenderness. Normal range of motion.      Comments: Low back with no surrounding tenderness, erythema, swelling, lesions noted. No paraspinal tenderness. Of note, area of interest appears more in L2-3 region, higher than L5-S1.   Left foot immobilized in boot    Skin:     General: Skin is warm and dry.   Neurological:      General: No focal deficit present.      Mental Status: He is alert and oriented to person, place, and time.      Cranial Nerves: No cranial nerve deficit.      Sensory: No sensory deficit.      Motor: No weakness.      Comments: Strength 5/5 in all extremities, has chronic neuropathy with no sensation up to thighs bilaterally.    Psychiatric:         Mood and Affect: Mood normal.         Behavior: Behavior normal.          An electronic signature was used to authenticate this note.    --Citlali Mansfield MD   [facility] PROFESSIONAL [facility] INTERNAL MEDICINE  [address] [place] [address]  Dept: [phone]  Dept Fax: [phone]  Loc: [phone]

## 2022-08-16 ENCOUNTER — OFFICE VISIT (OUTPATIENT)
Dept: INTERNAL MEDICINE CLINIC | Age: 52
End: 2022-08-16
Payer: MEDICARE

## 2022-08-16 VITALS
TEMPERATURE: 98.9 F | BODY MASS INDEX: 33.38 KG/M2 | SYSTOLIC BLOOD PRESSURE: 142 MMHG | HEIGHT: 74 IN | HEART RATE: 87 BPM | DIASTOLIC BLOOD PRESSURE: 84 MMHG

## 2022-08-16 DIAGNOSIS — Z79.4 TYPE 2 DIABETES MELLITUS WITH OTHER SPECIFIED COMPLICATION, WITH LONG-TERM CURRENT USE OF INSULIN (HCC): Primary | ICD-10-CM

## 2022-08-16 DIAGNOSIS — E11.69 TYPE 2 DIABETES MELLITUS WITH OTHER SPECIFIED COMPLICATION, WITH LONG-TERM CURRENT USE OF INSULIN (HCC): Primary | ICD-10-CM

## 2022-08-16 PROCEDURE — G0108 DIAB MANAGE TRN  PER INDIV: HCPCS | Performed by: INTERNAL MEDICINE

## 2022-08-16 RX ORDER — ARIPIPRAZOLE 10 MG/1
10 TABLET ORAL DAILY
COMMUNITY

## 2022-08-16 RX ORDER — INSULIN GLARGINE 100 [IU]/ML
80 INJECTION, SOLUTION SUBCUTANEOUS 2 TIMES DAILY
COMMUNITY
End: 2022-08-29

## 2022-08-16 ASSESSMENT — PATIENT HEALTH QUESTIONNAIRE - PHQ9
3. TROUBLE FALLING OR STAYING ASLEEP: 3
7. TROUBLE CONCENTRATING ON THINGS, SUCH AS READING THE NEWSPAPER OR WATCHING TELEVISION: 0
2. FEELING DOWN, DEPRESSED OR HOPELESS: 1
SUM OF ALL RESPONSES TO PHQ QUESTIONS 1-9: 9
8. MOVING OR SPEAKING SO SLOWLY THAT OTHER PEOPLE COULD HAVE NOTICED. OR THE OPPOSITE, BEING SO FIGETY OR RESTLESS THAT YOU HAVE BEEN MOVING AROUND A LOT MORE THAN USUAL: 0
SUM OF ALL RESPONSES TO PHQ QUESTIONS 1-9: 9
SUM OF ALL RESPONSES TO PHQ9 QUESTIONS 1 & 2: 4
5. POOR APPETITE OR OVEREATING: 1
SUM OF ALL RESPONSES TO PHQ QUESTIONS 1-9: 9
4. FEELING TIRED OR HAVING LITTLE ENERGY: 0
1. LITTLE INTEREST OR PLEASURE IN DOING THINGS: 3
SUM OF ALL RESPONSES TO PHQ QUESTIONS 1-9: 9
10. IF YOU CHECKED OFF ANY PROBLEMS, HOW DIFFICULT HAVE THESE PROBLEMS MADE IT FOR YOU TO DO YOUR WORK, TAKE CARE OF THINGS AT HOME, OR GET ALONG WITH OTHER PEOPLE: 1
6. FEELING BAD ABOUT YOURSELF - OR THAT YOU ARE A FAILURE OR HAVE LET YOURSELF OR YOUR FAMILY DOWN: 1
9. THOUGHTS THAT YOU WOULD BE BETTER OFF DEAD, OR OF HURTING YOURSELF: 0

## 2022-08-16 NOTE — PROGRESS NOTES
[facility]  [facility] [address] Roldan Navarro, [address]  [phone] (phone)  [phone] (fax)    Patient ID: Minh Haas 1970  Referring Provider: Dr. Karen Early     Patient's name and  were verified. Subjective:    He presents for His follow-up diabetic visit. He has type 2 diabetes mellitus. Home regimen includes: Insulin He is noncompliant much of the time. Assessment:     Lab Results   Component Value Date/Time    LABA1C 7.7 2022 09:08 AM    LABA1C 7.4 2021 02:18 AM    BUN 7 2022 06:00 AM    CREATININE 0.6 2022 06:00 AM     Vitals:    22 1500 22 1501   BP: (!) 146/86 (!) 142/84   Site: Right Upper Arm Left Upper Arm   Position: Sitting Sitting   Pulse: 87    Temp: 98.9 °F (37.2 °C)    Height: 6' 2\" (1.88 m)      Wt Readings from Last 3 Encounters:   22 260 lb (117.9 kg)   22 270 lb (122.5 kg)   22 284 lb 6.3 oz (129 kg)     Ht Readings from Last 3 Encounters:   22 6' 2\" (1.88 m)   22 6' 2\" (1.88 m)   22 6' 2\" (1.88 m)       Diabetes Pharmacotherapy:  Basaglar 80 units bedtime  Humalog 25-45 units every 2 hours during the day    Glucose Trends:   Current monitoring regimen: Dexcom CGM - checks 4+ times daily  Home blood sugar trends:    -V. High: 45%, High: 36%, Target: 18%, Low: 1%, V. Low: 1%  Any episodes of hypoglycemia? yes - 1 per week or less; timing rando,   -Treats with regular pop    Lifestyle Factors:   Previous visit with dietician: no  Current diet: B: skips            L: usually skips                       D: 2 cheeseburgers/ french fries-2 handfuls/ 1 cup corn                       Snacks: random -banana, orange, popcorn,c hips                       Beverages: koolaid with splenda; soda some diet/ few regular  Current exercise: ADL's; low activity levels; no weight bearing Left left.  [place]    Health Maintenance:  Eye exam current (within one year): no Date: ,  Radha Novak  Any history of foot problems? yes  Foot exam up to date: yes Date: 2 weeks ago, Dr. Gilford Sievert every 2 weeks. Left foot Charcoat-in a boot. Reports no open sores at this time  Immunizations up to date:    Pneumonia: no   COVID-19: no  The 10-year ASCVD risk score (Papo Rosa, et al., 2013) is: 14%    Values used to calculate the score:      Age: 46 years      Sex: Male      Is Non- : No      Diabetic: Yes      Tobacco smoker: No      Systolic Blood Pressure: 385 mmHg      Is BP treated: Yes      HDL Cholesterol: 26 mg/dL      Total Cholesterol: 147 mg/dL   Last panel - LDL:   Lab Results   Component Value Date    LDLCALC SEE BELOW 12/29/2020    LDLDIRECT 70.86 04/06/2021     Taking ASA:  Yes   Taking statin: No - Not identified  Last microalbumin/creatinine ratio: No results found for: MCACR, MALBCR, MALBCRRATEXT   Taking ACE/ARB: No - not identified  Depression screening completed. 8/15/22 Score 9    Focus:   Diabetes Education. Last A1C: 7.7% 6/7/22. Hector's blood sugars have not improved. Candie Scott is taking 25-45 units Humalog every 2 hours during waking hours. He is doing this with or without a meal. He is dosing Humalog after eating because of belly pain during digestion of the meal. He eats 1-2 meals per day. Last night's dinner consisted of over 120 grams carb. Smaller meals more often would be m ore beneficial. Wife states Canide Scott is not going to change and he will not come back if we keep trying to change the same issues. He is dealing with depression; making goal setting even harder. He states he received the 'paperwork' today for help with his medication. He will need to complete and get this returned for help. Discussed goals accomplished and ones he is willing to work on moving forward.      Curriculum Area Focus: Diabetes complications, Glucose checks/goals, Carbohydrate counting/meal planning, Physical activity goals, Hypoglycemia management, and Lifestyle & healthy coping  DSME PLAN:     Try to get movement with stretch bands for 8-10 minutes 1-2 times               Per day                Upper body--arms movements that do not hurt your back                Legs --stretch knees apart/ affects hips / thighs  Keep working on getting less and less regular beverages/ pop  Good job with Dexcom use          --call Dexcom about your 2 bad sensors so you can replace them  Keep thinking about getting your Humalog as close to eating as possible           --this will cover carbohydrates better  We will check on concentrated insulin     Goals Addressed                   This Visit's Progress     check blood sugars 4 times per day   On track     Exercise 10-20 minutes daily after supper   Not on track     no regular pop   Not on track           Meter download, medications, PMH and nursing assessment reviewed with Jeana Hurtado, Pharm D. Minh Gilbertsinai states He is willing to participate in this plan of care and verbalized understanding of all instructions provided. Teach back used to verify comprehension. Follow-up: 6 weekw    Total time involved in direct patient education: 60 minutes. Phone call to [facility] after visit/ review with CROW Anglin PharmD. Instructed Hector to increase his evening dose of Basglar to 95 units (80 units plus 15 units). Hector voiced understanding of this increase in insulin via teach back.

## 2022-08-16 NOTE — PATIENT INSTRUCTIONS
Try to get movement with stretch bands for 8-10 minutes 1-2 times               Per day                Upper body--arms movements that do not hurt your back                Legs --stretch knees apart/ affects hips / thighs  Keep working on getting less and less regular beverages/ pop  Good job with Dexcom use          --call Dexcom about your 2 bad sensors so you can replace them  Keep thinking about getting your Humalog as close to eating as possible           --this will cover carbohydrates better  We will check on concentrated insulin

## 2022-08-19 ENCOUNTER — HOSPITAL ENCOUNTER (OUTPATIENT)
Dept: CT IMAGING | Age: 52
Discharge: HOME | End: 2022-08-19
Payer: MEDICARE

## 2022-08-19 DIAGNOSIS — G44.89 OTHER HEADACHE SYNDROME: ICD-10-CM

## 2022-08-19 DIAGNOSIS — M54.50 BILATERAL LOW BACK PAIN WITHOUT SCIATICA, UNSPECIFIED CHRONICITY: ICD-10-CM

## 2022-08-19 PROCEDURE — 72131 CT LUMBAR SPINE W/O DYE: CPT

## 2022-08-19 PROCEDURE — 70450 CT HEAD/BRAIN W/O DYE: CPT

## 2022-08-22 ENCOUNTER — HOSPITAL ENCOUNTER (OUTPATIENT)
Age: 52
Discharge: HOME | End: 2022-08-22
Payer: MEDICARE

## 2022-08-22 ENCOUNTER — TELEPHONE (OUTPATIENT)
Dept: INTERNAL MEDICINE CLINIC | Age: 52
End: 2022-08-22

## 2022-08-22 DIAGNOSIS — Z11.59 SCREENING FOR VIRAL DISEASE: Primary | ICD-10-CM

## 2022-08-22 LAB
INFLUENZA A: NOT DETECTED
INFLUENZA B: NOT DETECTED
SARS-COV-2 RNA, RT PCR: DETECTED

## 2022-08-22 PROCEDURE — 87636 SARSCOV2 & INF A&B AMP PRB: CPT

## 2022-08-22 NOTE — PROGRESS NOTES
Discussed glucose trends and case with Servando Tinajero, RN. Diabetes Pharmacotherapy:  Basaglar 80 units q12h  Humalog 25-45 units every 2 hours during the day     Glucose Trends:   Current monitoring regimen: Dexcom CGM - checks 4+ times daily  Home blood sugar trends:               -V. High: 45%, High: 36%, Target: 18%, Low: 1%, V. Low: 1%    Plan (changes per CPA):    Continue Basaglar 80 units in the AM. Increase evening dose of Basglar to 95 units (80 units plus 15 units)  Continue to work on approval for concentrated insulin through PAP  Follow up in 5 days with PCP/IM resident    For [address] [place] [address] in place:  Yes  Recommendation Provided To: Patient/Caregiver: 1 via Telephone  Intervention Detail: Dose Adjustment: 1, reason: Therapy Optimization  Gap Closed?: No   Intervention Accepted By: Patient/Caregiver: 1  Time Spent (min): [address]ling, PharmD, [facility]  Internal Medicine Clinical Pharmacist  [phone]

## 2022-08-22 NOTE — TELEPHONE ENCOUNTER
Patient called requesting Covid test . Patient has cough , congestion and chills for past 3-4 days ,. Covid ordered and patient having done at [facility]. Nikky Fuentes

## 2022-08-23 ENCOUNTER — TELEPHONE (OUTPATIENT)
Dept: INTERNAL MEDICINE CLINIC | Age: 52
End: 2022-08-23

## 2022-08-23 NOTE — TELEPHONE ENCOUNTER
Patient informed that Covid test was positive . Reviewed self care and quarantine instructions . Patient would llke the Paxlovid . See refill encounter 8/23/22.

## 2022-08-23 NOTE — TELEPHONE ENCOUNTER
----- Message from Wojciech Underwood MD sent at 8/23/2022 12:14 AM EDT -----  Let him know he is COVID positive with typical instructions.   How long has he had symptoms - may be candidate for Paxlovid.  ----- Message -----  From: Darius Lofton Incoming Lab Results From [facility]  Sent: 8/22/2022   5:44 PM EDT  To: Wojciech Underwood MD

## 2022-08-24 RX ORDER — NIRMATRELVIR AND RITONAVIR 150-100 MG
KIT ORAL
Qty: 20 TABLET | Refills: 0 | OUTPATIENT
Start: 2022-08-24 | End: 2022-08-28

## 2022-08-29 ENCOUNTER — OFFICE VISIT (OUTPATIENT)
Dept: INTERNAL MEDICINE CLINIC | Age: 52
End: 2022-08-29

## 2022-08-29 VITALS
SYSTOLIC BLOOD PRESSURE: 145 MMHG | TEMPERATURE: 98 F | BODY MASS INDEX: 33.38 KG/M2 | HEART RATE: 84 BPM | HEIGHT: 74 IN | DIASTOLIC BLOOD PRESSURE: 95 MMHG

## 2022-08-29 DIAGNOSIS — I10 PRIMARY HYPERTENSION: ICD-10-CM

## 2022-08-29 DIAGNOSIS — M48.061 FORAMINAL STENOSIS OF LUMBAR REGION: Primary | ICD-10-CM

## 2022-08-29 DIAGNOSIS — R51.9 NONINTRACTABLE HEADACHE, UNSPECIFIED CHRONICITY PATTERN, UNSPECIFIED HEADACHE TYPE: ICD-10-CM

## 2022-08-29 DIAGNOSIS — E11.40 TYPE 2 DIABETES MELLITUS WITH DIABETIC NEUROPATHY, WITH LONG-TERM CURRENT USE OF INSULIN (HCC): ICD-10-CM

## 2022-08-29 DIAGNOSIS — Z79.4 TYPE 2 DIABETES MELLITUS WITH DIABETIC NEUROPATHY, WITH LONG-TERM CURRENT USE OF INSULIN (HCC): ICD-10-CM

## 2022-08-29 PROBLEM — L97.514 ULCER OF RIGHT FOOT WITH NECROSIS OF BONE (HCC): Status: ACTIVE | Noted: 2022-05-22

## 2022-08-29 PROBLEM — M24.572 CONTRACTURE, LEFT ANKLE: Status: ACTIVE | Noted: 2022-08-29

## 2022-08-29 RX ORDER — LACOSAMIDE 200 MG/1
200 TABLET ORAL 2 TIMES DAILY
COMMUNITY
End: 2023-01-13

## 2022-08-29 RX ORDER — INSULIN GLARGINE 100 [IU]/ML
120 INJECTION, SOLUTION SUBCUTANEOUS 2 TIMES DAILY
Qty: 5 PEN | Refills: 2 | Status: SHIPPED | OUTPATIENT
Start: 2022-08-29

## 2022-08-29 RX ORDER — LISINOPRIL 5 MG/1
5 TABLET ORAL DAILY
Qty: 90 TABLET | Refills: 1 | Status: SHIPPED | OUTPATIENT
Start: 2022-08-29 | End: 2023-01-16 | Stop reason: SDUPTHER

## 2022-08-29 RX ORDER — GABAPENTIN 600 MG/1
600 TABLET ORAL 4 TIMES DAILY
Qty: 120 TABLET | Refills: 2 | Status: SHIPPED | OUTPATIENT
Start: 2022-08-29 | End: 2023-01-16 | Stop reason: SDUPTHER

## 2022-08-29 NOTE — PROGRESS NOTES
1970    Chief Complaint   Patient presents with    Other     2 week f/u bilateral lower back pain , headache syndrome  - had positive Covid test 8 days ago     Diabetes    Hypertension       Pt is a 46 y.o. male who presents for a follow-up visit (2 weeks for CT imaging) for back pain, headaches, and diabetes. She is a previous patient of Dr. Susanna Olszewski. She also sees a Neurologist in [place], but cannot remember his name. This Neurologist is only following for seizures specifically. PMH is notable for uncontrolled type 2 DM and HTN. He reports feeling well overall. However, he continues to have his back pain. His CT Lumbar showed multilevel lumbar foraminal stenosis bilaterally which was not present on previous imaging. His CT Head was unremarkable. He reports that he did not receive any labs despite Dr. Cleo Sam ordering them earlier this month. He was unable to tolerate MRI. He denies any infections in his left leg or anywhere else. He reports feeling \"hot\" all the time, but did not have any recorded fevers. He denies any nausea, vomiting, or abdominal pain worse than his baseline. He has chronic numbness below his knees in both legs from his baseline. He has baseline headache and lightheadedness that occur intermittently, but have not been worsened than usual. When inquiring about his headaches, he stopped his Depakote 9 months ago and only developed his headaches 2 months ago. He reports that he is unable to take Tylenol or Ibuprofen because since the former affects his CBM and the latter affects his stomach issues. He reports that his blood glucose levels have slowly improved, with his average glucose reducing to 240 and his percentage in range now at 23% (previously at 0). The patient is willing to trial a low-dose of Trulicity to see if this improves his blood sugars. He was informed about the nausea/vomiting side effects associated with Trulicity but is willing to try it.     He denies any smoking, alcohol, and illicit drug use at this time. Past Medical History:   Diagnosis Date    Abnormal thyroid biopsy     s/p left hemithyroidectomy 2/0286 -benign follicular adenoma    Acid reflux     Anemia     resolved - previously seen by Dr. Jack Palacios (due to enlarged spleen)    Anesthesia     seizures with brain surgery due to blood sugar got really high    Bile reflux gastritis     per EGD 11/16/21 - Dr. Martinez Hubbard - to start Carafate. Bipolar disorder ([address]     Blood clot in vein 04/07/2016    Rudron Cruzal     Chest pain     previously seeing [address] [place] [address] cardiologist, now seeing Dr. Annabelle Graf (LAD bridging on cath 4/2016)    Chronic back pain     Chronic bronchitis ([facility]     Dr. Lea End polyp 08/30/2016    Depression     seeing Yu Wang    DVT (deep venous thrombosis) ([facility] [address] [facility] [id number]    not on Coumadin due to unable to regulate - Dr. Shan Villarreal - no etiology found per patient    Esophageal abnormality     nodule     Fatty liver disease, nonalcoholic     U/S 63/6041 [facility]    Follicular adenoma of thyroid gland 01/15/2021    s/p lobectomy    Furuncle     legs    History of Doppler ultrasound 05/29/2011    No hemodynamically significant carotid stenosis is identified. A thyroid nodule on each side. Dedicated ultrasound of thyroid gland is suggested to further evaluate if clinically indicated.      History of pulmonary embolism 2007    s/p GFF, related to knee surgery    Hx of blood clots     leg and lung    Hyperlipidemia     severely elevated triglycerides    Hypertension     diastolic    Intracranial arachnoid cyst 11/2012    Dr. Saini  drained, complicated with seizures, DKA    Irritable bowel syndrome     Liver disease     Serene Coby - elevated LFT - positive smooth muscle antibody, steatosis per liver bx 3/2014 with Dr. Jeri Stone (multiple drug resistant organisms) resistance 2012    MRSA (methicillin resistant staph aureus) culture positive     h/o in foot and before brain surgery    Nephrolithiasis noted on CT abdomen 9/2016, 6/2019    Neuropathy     Pancreatic insufficiency     Positive SANDY (antinuclear antibody)     Dr. Moreno Semen - first visit in 6/2013    Prolonged emergence from general anesthesia     Restless legs syndrome     Seizures ([address]     started with brain surgery    Sinus tachycardia     Holter 3/2013 - seeing Dr. Adam Toribio    Skull fracture [facility])     clips     Sleep apnea     positive sleep apnea per study 10/2020. Systolic dysfunction     \"systolic bridge\"  EF 54% ECHO 9/2019    Type II or unspecified type diabetes mellitus without mention of complication, not stated as uncontrolled 2007       Past Surgical History:   Procedure Laterality Date    CARDIAC CATHETERIZATION      2011,5-6 years ago    CARDIAC CATHETERIZATION  03/2012    CARDIAC CATHETERIZATION  04/28/2016    [address] RELEASE  01/2017    CHOLECYSTECTOMY  2004    COLONOSCOPY  2012    COLONOSCOPY  08/2016    2 tubular adenomas - reportedly needs repeat scope in 6 months    CRANIOTOMY  11/2012    arachnoid cyst drainage    EKG 12-LEAD  10/18/2015         ENDOSCOPY, COLON, DIAGNOSTIC      FOOT SURGERY  05/20/2022    right foot metatarsal phalangeal joint resection and placement of antibiotic beads, left foot Achilles tendon lengthening    HERNIA REPAIR Right 1996    [facility] Right 09/15/2016    Robotic assisted    KNEE ARTHROSCOPY Right 2016    KNEE SURGERY Left 2007    acl and debrided twice    OTHER SURGICAL HISTORY  05/31/2011    Tilt table was associated w/ nonspecific symptoms or nausea, otherwise no significant dizziness or syncope. No significant hemodynamic changes. Otherwise, unremarkable tilt table test after 30 minutes of tilting.      OTHER SURGICAL HISTORY  01/20/2017    RIGHT SHOULDER ARTHROSCOPY, OPEN STAN, OPEN ACROMIOPLASTY, BICEP TENDESIS, RIGHT CARPAL TUNNEL RELEASE    SHOULDER SURGERY Right 01/2007    THYROID LOBECTOMY N/A 01/15/2021    THYROID LOBECTOMY, UVULOPALATOPHARYNGOPLAST LAOP performed by Loulou Nolasco MD at [address] [facility] [address] [facility] [address] ECHOCARDIOGRAM  03/05/2011    LV size and systolic function normal. EF 55-65%. UPPER GASTROINTESTINAL ENDOSCOPY  2012    UPPER GASTROINTESTINAL ENDOSCOPY  2016    Dr. Keke Loyola Left 10/22/2019    EGD DILATION SAVORY performed by Courtney Juárez MD at [address] ENDOSCOPY Left 10/22/2019    EGD BIOPSY performed by Courtney Juárez MD at [address] [facility] 11/16/2021    EGD performed by Courtney Juárez MD at [address] [facility] Left 11/16/2021    EGD BIOPSY performed by Courtney Juárez MD at [address] [place] [address]  2007       Current Outpatient Medications   Medication Sig Dispense Refill    lacosamide (VIMPAT) 200 MG tablet Take 200 mg by mouth 2 times daily. gabapentin (NEURONTIN) 600 MG tablet Take 1 tablet by mouth 4 times daily for 90 days. 120 tablet 2    insulin glargine (BASAGLAR KWIKPEN) 100 UNIT/ML injection pen Inject 120 Units into the skin 2 times daily 5 pen 2    Dulaglutide 0.75 MG/0.5ML SOPN Inject 0.75 mg into the skin once a week 4 pen 0    lisinopril (PRINIVIL;ZESTRIL) 5 MG tablet Take 1 tablet by mouth daily 90 tablet 1    metoprolol tartrate (LOPRESSOR) 25 MG tablet Take 1 tablet by mouth 2 times daily 180 tablet 1    ARIPiprazole (ABILIFY) 15 MG tablet Take 15 mg by mouth in the morning.       doxepin (SINEQUAN) 25 MG capsule TAKE 1-2 CAPSULES BY MOUTH AT BEDTIME AS NEEDED      rOPINIRole (REQUIP) 1 MG tablet Take 1 tablet by mouth 3 times daily 270 tablet 1    mirtazapine (REMERON) 15 MG tablet Take 15 mg by mouth nightly      promethazine (PHENERGAN) 12.5 MG tablet Take 1-2 tablets by mouth every 8 hours as needed for Nausea 60 tablet 1    aspirin 81 MG chewable tablet Take 1 tablet by mouth daily 30 tablet 0    Insulin Pen Needle 32G X 4 MM MISC Use 5 times daily with insulin pens 450 each 3    vitamin D (ERGOCALCIFEROL) 1.25 MG (82157 UT) CAPS capsule Take 1 capsule by mouth once a week 12 capsule 1    sucralfate (CARAFATE) 1 GM tablet Take 1 g by mouth 4 times daily      vitamin B-12 (CYANOCOBALAMIN) 1000 MCG tablet Take 1,000 mcg by mouth 2 times daily       DULoxetine (CYMBALTA) 60 MG extended release capsule Take 120 mg by mouth daily      levETIRAcetam (KEPPRA) 1000 MG tablet Take 2 tablets by mouth 2 times daily (Patient taking differently: Take 1,000 mg by mouth 2 times daily) 60 tablet 3    insulin lispro (HUMALOG) 100 UNIT/ML injection vial Takes 25-45 units with meals plus personal sliding scale      pantoprazole (PROTONIX) 40 MG tablet Take 1 tablet by mouth 2 times daily 180 tablet 1    lacosamide (VIMPAT) 200 MG tablet Take 1 tablet by mouth 2 times daily for 90 days. 180 tablet 2    blood glucose test strips (ONETOUCH ULTRA) strip 1 each by In Vitro route 5 times daily DX: E11.65 Dispense Onetouch Ultra 2 test strips (Patient not taking: Reported on 8/29/2022) 450 each 3     No current facility-administered medications for this visit.       Allergies   Allergen Reactions    Ciprofloxacin-Ciproflox Hcl Er Other (See Comments)     Elevated creatinine    Heparin      Monitor for thrombocytopenia    Metformin Nausea Only     Abdominal pain, diarrhea    Niacin And Related Other (See Comments)     Burning sensation, severe flushing    Tramadol Hcl      Contraindication to seizure medications    Ultram [Tramadol]      Contraindication to seizure medications    Warfarin      Very difficult to regulate in past    Other      Other reaction(s): very difficult to regulate    Tylenol [Acetaminophen] Other (See Comments)     Interferes with CGM        Social History     Socioeconomic History    Marital status:      Spouse name: Not on file    Number of children: 3    Years of education: Not on file    Highest education level:  Not on file   Occupational History    Occupation: Disability since 2011   Tobacco Use    Smoking status: Never    Smokeless tobacco: Never   Vaping Use    Vaping Use: Never used   Substance and Sexual Activity    Alcohol use: No     Alcohol/week: 0.0 standard drinks    Drug use: No    Sexual activity: Not on file   Other Topics Concern    Not on file   Social History Narrative    Not on file     Social Determinants of Health     Financial Resource Strain: Low Risk     Difficulty of Paying Living Expenses: Not hard at all   Food Insecurity: No Food Insecurity    Worried About Running Out of Food in the Last Year: Never true    Ran Out of Food in the Last Year: Never true   Transportation Needs: Not on file   Physical Activity: Not on file   Stress: Not on file   Social Connections: Not on file   Intimate Partner Violence: Not on file   Housing Stability: Not on file       Family History   Problem Relation Age of Onset    Heart Disease Father 61        MI    Stroke Brother     Obesity Brother     Arthritis Mother     Seizures Mother     Obesity Sister     Other Brother         pulmonary embolism    Diabetes Daughter     Seizures Brother        Review of Systems - General ROS: negative for - chills or fever. Reports feeling \"hot\" but no objective fever noted. Psychological ROS: negative for - anxiety and depression  Respiratory ROS: no cough, shortness of breath, or wheezing  Cardiovascular ROS: no chest pain or dyspnea on exertion, tolerates a flight of stairs, vacuuming  Gastrointestinal ROS: no acute worsening of abdominal pain nausea, vomiting, change in bowel habits, or black or bloody stools  Musculoskeletal ROS: negative for - muscle pain or muscular weakness, positive for back pain. Neurological ROS: Positive for headache with occasional lightheadedness at baseline. Has chronic numbness due to neuropathy below the knees in both legs. Negative for seizure and weakness.    Dermatological ROS: negative for - rash or skin lesion changes    Blood pressure (!) 145/95, pulse 84, temperature 98 °F (36.7 °C), height 6' 2\" (1.88 m). Physical Examination: General appearance -alert, well appearing, and in no distress  Mental status - alert, oriented to person, place, and time  Head - atraumatic, normocephalic  Eyes - pupils equal and reactive to light, extraocular muscles intact  Ears - external ears are normal, hearing is grossly normal  Mouth - oral pharynx clear, mucous membranes moist  Neck - supple, no significant adenopathy  Chest - clear to auscultation, no wheezes, rales or rhonchi, symmetric air entry  Heart - normal rate, regular rhythm, normal S1, S2, no murmurs, rubs, clicks or gallops  Abdomen -soft, obese habitus. nontender, nondistended  Neurological - AAO x3. Sensation absent below the knee in bilateral extremities (chronic per patient, diabetic neuropathy)  Extremities - no pedal edema, no clubbing or cyanosis. Left leg wrapped up in a red cast.  Skin - warm and dry    Diagnostic data:   I have reviewed recent diagnostic testing including labs with patient today. Assessment and Plan:     Diagnosis Orders   1. Foraminal stenosis of lumbar region  Noemy Salgado MD, Pain Medicine[facility]      2. Type 2 diabetes mellitus with diabetic neuropathy, with long-term current use of insulin (HCC)  gabapentin (NEURONTIN) 600 MG tablet    insulin glargine (BASAGLAR KWIKPEN) 100 UNIT/ML injection pen    Dulaglutide 0.75 MG/0.5ML SOPN      3. Primary hypertension  lisinopril (PRINIVIL;ZESTRIL) 5 MG tablet    metoprolol tartrate (LOPRESSOR) 25 MG tablet      4. Nonintractable headache, unspecified chronicity pattern, unspecified headache type          Multilevel Lumbar Foraminal Stenosis: Identified on CT Lumbar spine on 8/19/22. Not present on previous imaging. In the absence of other findings, this is the primary cause of the patient's persistent and severe back pain.   - Will refer to Dr. Faviola Dee for pain management clinic  - Surgery not indicated for foraminal stenosis. Type 2 DM with Diabetic Neuropathy and Long-Term Current Use of Insulin: Remains uncontrolled. As his C-peptide was detectable, he has type 2 DM and not type 1 DM. Per patient's glucose monitor, glucose average is 240 with 23% in range. Last HbA1c 7.7% on 6/7/22. Of note, the patient increased his insulin glargine dose to 120 units BID because he mistakenly believed that pharmacy had instructed him to, but his glucoses remain elevated despite this change. Associated with extensive neuropathy affecting both of his legs below the knees. - Will trial patient on Trulicity 6.87 mg weekly with samples. The patient was educated on the side effects of nausea and vomiting with Trulicity, but is willing to try the lowest dose. - Gabapentin refilled at current dose  - Continue insulin glargine 120 mg BID due to continued lack of control despite unintentional increase. Primary Hypertension: Patient's blood pressure remains elevated in the 140/95 average. He had been instructed to increase his metoprolol to 50 mg BID, but had not done so as he wasn't aware of the change. - Continue current metoprolol dose of 25 mg BID. - Will start patient on Lisinopril 5 mg for further BP improvement in the context of uncontrolled diabetes. Headache: Unspecified type. Per patient report, they are currently stable and manageable. CT head on 8/19/22 was negative for any acute findings. Unlikely due to discontinuation of depakote, as that was discontinued 9 months ago and his headaches started 2 months ago. - Anticipate improvement along with management of back pain at the pain management clinic. Follow-up in 2 months in October (prior scheduled appointment)    Staffed with: Dr. Will Crawford. Agata Quigley.  [address]  Dept: [phone]  Dept Fax: [phone]  Loc: Bre Boss, DO

## 2022-08-29 NOTE — PATIENT INSTRUCTIONS
Add Lisinopril 5 mg / day for BP control and kidney protection in diabetes  Will trial Trulicity 6.46 mg once a week  Refer to Pain Management Clinic with Dr. Rosa Elena Singh  Follow-up in 2 months

## 2022-08-30 RX ORDER — INSULIN GLARGINE 100 [IU]/ML
120 INJECTION, SOLUTION SUBCUTANEOUS 2 TIMES DAILY
Qty: 25 PEN | Refills: 2 | OUTPATIENT
Start: 2022-08-30 | End: 2022-09-19

## 2022-08-30 RX ORDER — DULAGLUTIDE 0.75 MG/.5ML
0.75 INJECTION, SOLUTION SUBCUTANEOUS WEEKLY
Qty: 4 PEN | Refills: 0 | COMMUNITY
Start: 2022-08-30 | End: 2022-09-19

## 2022-08-30 NOTE — TELEPHONE ENCOUNTER
Pt was seen in the office yesterday and scripts were given for Basaglar. I received from [address] [place] [address] that [place] requires a PA but lantus and levemir are on his plan. I spoke with Dr. Susan Choi and she authorized Lantus solostar.     I called in to [facility] and spoke to [employer]

## 2022-08-31 ENCOUNTER — CARE COORDINATION (OUTPATIENT)
Dept: CARE COORDINATION | Age: 52
End: 2022-08-31

## 2022-08-31 NOTE — CARE COORDINATION
Spoke with Belle Goetz today. Enrolled in care coordination. Pt spoke with wife since his last appt. Pt is requesting referral to Dr. Krysten Hughes for evaluation of his back. Please advise.

## 2022-08-31 NOTE — CARE COORDINATION
Ambulatory Care Coordination Note  8/31/2022    ACC: Gibson Neal RN    Summary Note: Bryant Leggett was referred to care coordination by [facility] report. Spoke with Bryant Leggett today. Agreeable to f/u calls. Bryant Leggett has h/o: Diabetes, DVT, Charcot's joint, Bipolar, Seizures, HTN, GERD, IBS. Pt cares for 3 grandchildren at home ages: 2, 1, 4. Wife is a nurse and works full time. Charcot's foot: following with Dr. Maral Hooker. Has upcoming CT scan of left ankle on 9/6. Currently in a cast.  Reports that he is not supposed to bear any wt on lle but states that at times he has to. Encouraged to avoid doing so if at all possible. IBS-follows with GI. Has upcoming colonoscopy and liver bx. Battles with diarrhea most of time. Follows with Gale Bradford, CHELO and Dr. Eric Mcdaniel with Dr. Flaco Fisher. Just had appt yesterday. Medication changes were made. Documented on medication record. HTN: lisinopril added at appt earlier this wk. Pt has bp cuff but hasn't been monitoring. Encouraged to do so. Diabetes: following with diabetic clinic. On Basaglar and Humalog. Pt reports that he is to obtain a new C peptide level. Recently started on Trulicity. Reports BS's continue to run on higher side despite taking large doses of insulin. Has Dexcom. Last A1C 7.7. Having back pain. Was referred to pain mgmt. Pt discussed with wife and they would like referral to Dr. William Alvarenga. Plan of care:  Start monitoring BP's  Continue monitoring BS's  Take insulin as prescribed. Close f/u with podiatry. Avoid bearing wt if at all possible   Message routed to PCP re: referral request to Dr. Lilia Plata specialist to mail out Diabetes. Call if unable to afford Trulicity. Currently using samples at present time  F/u with GI as scheduled  Use DME equip at tie being.      Weekly ACM calls  Ambulatory Care Coordination Assessment    Care Coordination Protocol  Referral from Primary Care Provider: No  Week 1 - Initial Assessment     Do you have all of your prescriptions and are they filled?: No  Barriers to medication adherence: None  Other barriers to medication adherence: unable to get at times d/t financial strains  Are you able to afford your medications?: No  How often do you have trouble taking your medications the way you have been told to take them?: I always take them as prescribed. Do you have Home O2 Therapy?: No      Ability to seek help/take action for Emergent Urgent situations i.e. fire, crime, inclement weather or health crisis. : Independent  Ability to ambulate to restroom: Independent  Ability handle personal hygeine needs (bathing/dressing/grooming): Independent  Ability to manage Medications: Independent  Ability to prepare Food Preparation: Independent  Ability to maintain home (clean home, laundry): Independent  Ability to drive and/or has transportation: Dependent  Ability to do shopping: Independent  Ability to manage finances:  Independent  Is patient able to live independently?: Yes     Current Housing: Private Residence  For whom are you the caregiver?: 3 grandchildren  Does the person that you care for see a [facility] PCP?: No           Frequent urination at night?: Yes  Do you use rails/bars?: Yes  Do you have a non-slip tub mat?: Yes     Are you experiencing loss of meaning?: No  Are you experiencing loss of hope and peace?: Yes (Comment: declines spiritual care referral)     Thinking about your patient's physical health needs, are there any symptoms or problems (risk indicators) you are unsure about that require further investigation?: No identified areas of uncertainly or problems already being investigated   Are the patients physical health problems impacting on their mental well-being?: No identified areas of concern   Are there any problems with your patients lifestyle behaviors (alcohol, drugs, diet, exercise) that are impacting on physical or mental well-being?: No identified areas of concern   Do you have any Education Plan, Set up/Review Goals                Prior to Admission medications    Medication Sig Start Date End Date Taking? Authorizing Provider   Dulaglutide (TRULICITY) 7.37 HF/7.2PL SOPN Inject 0.75 mg into the skin once a week 8/30/22  Yes Mauricio German MD   lacosamide (VIMPAT) 200 MG tablet Take 200 mg by mouth 2 times daily. Yes Historical Provider, MD   gabapentin (NEURONTIN) 600 MG tablet Take 1 tablet by mouth 4 times daily for 90 days.  8/29/22 11/27/22 Yes Layla Galan, DO   insulin glargine [facility]) 100 UNIT/ML injection pen Inject 120 Units into the skin 2 times daily 8/29/22  Yes Layla Galan DO   lisinopril (PRINIVIL;ZESTRIL) 5 MG tablet Take 1 tablet by mouth daily 8/29/22  Yes Layla Galan DO   metoprolol tartrate (LOPRESSOR) 25 MG tablet Take 1 tablet by mouth 2 times daily 8/29/22  Yes Layla Galan DO   ARIPiprazole (ABILIFY) 15 MG tablet Take 10 mg by mouth daily Dose reduced by psychiatry 8/30/22   Yes Historical Provider, MD   rOPINIRole (REQUIP) 1 MG tablet Take 1 tablet by mouth 3 times daily 7/4/22  Yes Mauricio German MD   promethazine (PHENERGAN) 12.5 MG tablet Take 1-2 tablets by mouth every 8 hours as needed for Nausea 6/20/22  Yes Mauricio German MD   aspirin 81 MG chewable tablet Take 1 tablet by mouth daily 6/15/22  Yes Tasha Stein, DO   vitamin D (ERGOCALCIFEROL) 1.25 MG (74882 UT) CAPS capsule Take 1 capsule by mouth once a week 4/26/22  Yes Mauricio German MD   sucralfate (CARAFATE) 1 GM tablet Take 1 g by mouth 4 times daily   Yes Historical Provider, MD   DULoxetine (CYMBALTA) 60 MG extended release capsule Take 120 mg by mouth daily   Yes Historical Provider, MD   levETIRAcetam (KEPPRA) 1000 MG tablet Take 2 tablets by mouth 2 times daily  Patient taking differently: Take 1,000 mg by mouth 2 times daily 8/4/21  Yes Polina Rea MD   insulin lispro (HUMALOG) 100 UNIT/ML injection vial Takes 25-45 units with meals plus personal sliding scale   Yes Historical Provider, MD   pantoprazole (PROTONIX) 40 MG tablet Take 1 tablet by mouth 2 times daily 3/8/21  Yes Petty Alexander MD   insulin glargine (LANTUS SOLOSTAR) 100 UNIT/ML injection pen Inject 120 Units into the skin 2 times daily 8/30/22   Petty Alexander MD   Dulaglutide 0.75 MG/0.5ML SOPN Inject 0.75 mg into the skin once a week 8/29/22   Juve Macedo DO   Insulin Pen Needle 32G X 4 MM MISC Use 5 times daily with insulin pens 5/18/22   Petty Alexander MD   lacosamide (VIMPAT) 200 MG tablet Take 1 tablet by mouth 2 times daily for 90 days.  4/7/22 8/16/22  Nagi Keys MD   blood glucose test strips (ONETOUCH ULTRA) strip 1 each by In Vitro route 5 times daily DX: E11.65 Dispense Onetouch Ultra 2 test strips  Patient not taking: Reported on 8/29/2022 2/22/22   Petty Alexander MD   vitamin B-12 (CYANOCOBALAMIN) 1000 MCG tablet Take 1,000 mcg by mouth 2 times daily   Patient not taking: Reported on 8/31/2022    Historical Provider, MD       Future Appointments   Date Time Provider Staci Jay   9/6/2022  4:00 PM STR CT IMAGING RM1  [facility] OUT EXP STR Radiolog   9/6/2022  4:10 PM STR CT IMAGING RM1  [facility] OUT EXP STR Radiolog   9/19/2022  4:00 PM CHARLA Santo [facility] SRPX Physic [facility] [place]   10/18/2022 10:00 AM Petty Alexander MD SRPX Physic [facility] [place]   12/28/2022  1:40 PM Nagi Keys MD Neuro Spec [facility]

## 2022-09-06 ENCOUNTER — HOSPITAL ENCOUNTER (OUTPATIENT)
Dept: CT IMAGING | Age: 52
Discharge: HOME | End: 2022-09-06
Payer: MEDICARE

## 2022-09-06 DIAGNOSIS — S92.309D CLOSED FRACTURE OF METATARSAL BONE WITH ROUTINE HEALING, PHYSEAL INVOLVEMENT UNSPECIFIED, UNSPECIFIED LATERALITY, UNSPECIFIED METATARSAL, SUBSEQUENT ENCOUNTER: ICD-10-CM

## 2022-09-06 DIAGNOSIS — M14.672 CHARCOT'S JOINT OF ANKLE, LEFT: ICD-10-CM

## 2022-09-06 PROCEDURE — 73700 CT LOWER EXTREMITY W/O DYE: CPT

## 2022-09-06 NOTE — TELEPHONE ENCOUNTER
MD Manny Sheth, JEWEL; Santiago Cristobal LPN  Caller: Unspecified (6 days ago,  1:35 PM)  This is not what we discussed at visit - but if he wants a second opinion that is fine.   Holly to put in referral.

## 2022-09-12 ENCOUNTER — CARE COORDINATION (OUTPATIENT)
Dept: CARE COORDINATION | Age: 52
End: 2022-09-12

## 2022-09-12 DIAGNOSIS — M54.50 BILATERAL LOW BACK PAIN WITHOUT SCIATICA, UNSPECIFIED CHRONICITY: ICD-10-CM

## 2022-09-12 DIAGNOSIS — M48.061 FORAMINAL STENOSIS OF LUMBAR REGION: Primary | ICD-10-CM

## 2022-09-12 NOTE — CARE COORDINATION
Attempted to reach Kunal Strange today for care coordination f/u. No answer. Message left to return call.

## 2022-09-14 ENCOUNTER — CARE COORDINATION (OUTPATIENT)
Dept: CARE COORDINATION | Age: 52
End: 2022-09-14

## 2022-09-19 ENCOUNTER — PHARMACY VISIT (OUTPATIENT)
Dept: INTERNAL MEDICINE CLINIC | Age: 52
End: 2022-09-19
Payer: MEDICARE

## 2022-09-19 ENCOUNTER — CARE COORDINATION (OUTPATIENT)
Dept: CARE COORDINATION | Age: 52
End: 2022-09-19

## 2022-09-19 DIAGNOSIS — E11.40 TYPE 2 DIABETES MELLITUS WITH DIABETIC NEUROPATHY, WITH LONG-TERM CURRENT USE OF INSULIN (HCC): Primary | ICD-10-CM

## 2022-09-19 DIAGNOSIS — Z79.4 TYPE 2 DIABETES MELLITUS WITH DIABETIC NEUROPATHY, WITH LONG-TERM CURRENT USE OF INSULIN (HCC): Primary | ICD-10-CM

## 2022-09-19 LAB — HBA1C MFR BLD: 7.2 %

## 2022-09-19 PROCEDURE — G0108 DIAB MANAGE TRN  PER INDIV: HCPCS | Performed by: INTERNAL MEDICINE

## 2022-09-19 PROCEDURE — 83036 HEMOGLOBIN GLYCOSYLATED A1C: CPT | Performed by: INTERNAL MEDICINE

## 2022-09-19 NOTE — PATIENT INSTRUCTIONS
Fill out the [employer] as soon as possible and bring back to the office (along with proof of income). -This is for Toujeo, concentrated insulin    2. We will plan to obtain Trulicity 4.0IE (increased dose) through [employer]. 3. Increase your Basaglar to 120 units in the morning and 140 units in the evening    4. Increase your exercise to 20 minutes every day.  Seated exercises while watching TV              Zach Mesa  P) [phone]

## 2022-09-19 NOTE — PROGRESS NOTES
[facility]  [address] [place] [facility] [address]  [phone] (phone)  [phone] (fax)    Patient ID: Michelle Collado [id number]  Referring Provider: Dr. Sabrina Hooper     Patient's name and  were verified. Subjective:    He presents for His follow-up diabetic visit. He has type 2 diabetes mellitus. Home regimen includes: Insulin and GLP-1 Agonist He is noncompliant some of the time. Assessment:     Lab Results   Component Value Date/Time    LABA1C 7.2 2022 04:13 PM    BUN 7 2022 06:00 AM    CREATININE 0.6 2022 06:00 AM     Wt Readings from Last 3 Encounters:   22 260 lb (117.9 kg)   22 270 lb (122.5 kg)   22 284 lb 6.3 oz (129 kg)     Ht Readings from Last 3 Encounters:   22 6' 2\" (1.88 m)   22 6' 2\" (1.88 m)   22 6' 2\" (1.88 m)       Diabetes Pharmacotherapy:  Trulicity 7.68DM weekly x3 doses  Basaglar 120 units BID  Humalog 40 units  6-7x daily    Glucose Trends:   Current monitoring regimen: Dexcom CGM - checks 4+ times daily  Home blood sugar trends:    -V. High: 53%, High: 35%, Target: 11%, Low: 0%, V. Low: <1%   -Highest readings overnight  Any episodes of hypoglycemia? no    Lifestyle Factors:   Previous visit with dietician: no  Current diet: B: skips            L: salad Jeaneth Aas)                       D: cheese burger w/ bun                       Snacks: PB crackers - 1 pack; (HS): none                       Beverages: dt. Janice Box w/ splenda  Current exercise:  very limited by foot    Health Maintenance:  Eye exam current (within one year): no Date: , Dr. Yudith Garcia  Any history of foot problems? yes  Foot exam up to date: yes, Dr. Hodan Parks every 2 weeks.   -Left foot Charcoat-in a boot.    Immunizations up to date:               Pneumonia: no              COVID-19: no  The 10-year ASCVD risk score (Arnaud Rushing, et al., 2013) is: 14.5%  Last panel - LDL:   Lab Results   Component Value Date    LDLCALC SEE BELOW 2020 LDLDIRECT 70.86 04/06/2021   Taking ASA:  Yes   Taking statin: No - statin was previously discontinued d/t at goal LDL; could reconsider in the future  Last microalbumin/creatinine ratio: <1.2 (12/2020); repeat already reordered  Taking ACE/ARB: Yes- lisinopril    Focus:   Diabetes Education. Last A1C: 7.2% today, improved from 7.7% previously. C-peptide was 2.0 (7/2022), which indicates at least some degree on insulin production. Given this normal c-peptide, Hector's sudden degree of insulin resistance is surprising. Continue to suspect this is d/t physcial inactivity with foot issues. Given Hector's use of high doses of insulin, he would greatly benefit from concentrated insulin to improve absorption. Encouraged Hector to work with Ania Pulliam to complete the application for Toujeo. In addition, [facility] is tolerating Trulicity well. We will plan to increase the dose for the next fill through [facility]. Finally, encouraged seated exercises- even a small amount of physical activity will improve his insulin resistance. Curriculum Area Focus: Physical activity goals, Hyperglycemia management, and Medication adherence  DSME PLAN:       Fill out the [employer] as soon as possible and bring back to the office (along with proof of income). -This is for Toujeo, concentrated insulin    2. We will plan to obtain Trulicity 0.1PY (increased dose) through [facility]. 3. Increase your Basaglar to 120 units in the morning and 140 units in the evening    4. Increase your exercise to 20 minutes every day. Seated exercises while watching TV     Goals Addressed    None          Meter download, medications, PMH and nursing assessment reviewed. Ruben Banerjee states He is willing to participate in this plan of care and verbalized understanding of all instructions provided. Teach back used to verify comprehension.       Follow-up: 2 month PharmD f/u    Total time involved in direct patient education: 61 minutes.      For [facility] in place:  Yes  Recommendation Provided To: Patient/Caregiver: 2 via In person  Intervention Detail: Dose Adjustment: 1, reason: Therapy Optimization and Patient Access Assistance/Sample Provided  Gap Closed?: No   Intervention Accepted By: Patient/Caregiver: 2  Time Spent (min): [address], PharmD, [facility]  Internal Medicine Clinical Pharmacist  [phone]

## 2022-09-19 NOTE — CARE COORDINATION
Ambulatory Care Coordination Note  9/19/2022    ACC: Alea Dumont, RN    Summary Note: Herminio Gonzalez is being followed by care coordination for education and assistance in managing his chronic conditions and healthcare needs. Spoke with Celestino Marnie today. Pt has h/o:  Celestino Dye has h/o: Diabetes, DVT, Charcot's joint, Bipolar, Seizures, HTN, GERD, IBS. Charcot's joint: recent left CT foot/ankle. Pt has f/u with Dr. Carlos Min 9/21/22. Pt reports that they have ordered bone stimulator and will be coming out tomorrow to apply. Has cust to lle. NWB LLE. Diabetes: monitoring BS's. BS's fluctuating. Working with [facility]  assistance. Pt states that he received My Chart message from [place] about documentation completion but he has not received anything. Reinforced importance of monitoring feet daily. Denies any wounds at this time. Notified [place]. She will resend info. To pt. Continues to work with diabetic clinic. Back pain: Referral was placed to neurosurgery. Pt has not heard back from that office. Contacted Dr. Beckford Pod office. Informed that referral was received and they will be reaching out to pt shortly. Plan of care:  F/u with podiatry this wk as scheduled  Bone stimulator to be applied this wk. Follow weight bearing restrictions as instructed by podiatry  Continue closely monitoring BS's. Continue working with diabetic clinic  Neurosurgery will be reaching out to pt this wk. To arrange appt. Complete paperwork from Estevan Irvin once received for medication assistance. Diabetes Assessment    Medic Alert ID: No  Meal Planning: Avoidance of concentrated sweets, Carb counting   How often do you test your blood sugar?: Meals   Do you have barriers with adherence to non-pharmacologic self-management interventions?  (Nutrition/Exercise/Self-Monitoring): No   Have you ever had to go to the ED for symptoms of low blood sugar?: No       Do you have hyperglycemia symptoms?: No   Do you have hypoglycemia symptoms?: No   Last Blood Sugar Value: 197   Blood Sugar Monitoring Regimen: Before Meals, At Bedtime   Blood Sugar Trends: Fluctuating              Lab Results       None            Care Coordination Interventions    Referral from Primary Care Provider: No  Suggested Interventions and Community Resources  Disease Specific Clinic: Completed (Comment: diabetic clinic)  Meals on Wheels: Declined  Medi Set or Pill Pack: Declined (Comment: pt sets up pill box weekly)  Pharmacist: Completed (Comment: sees pharmacist through diabetic clinic)  Transportation Support: Declined  Zone Management Tools: In Process          Goals Addressed                   This Visit's Progress     Conditions and Symptoms        I will schedule office visits, as directed by my provider. I will keep my appointment or reschedule if I have to cancel. I will notify my provider of any barriers to my plan of care. I will follow my Zone Management tool to seek urgent or emergent care. I will notify my provider of any symptoms that indicate a worsening of my condition. diabetes    Barriers: financial, overwhelmed by complexity of regimen, stress, and time constraints  Plan for overcoming my barriers: work with diabetic clinic, education on diabetes zones. Monitor BS's closely. Complete med assistance forms  Confidence: 9/10  Anticipated Goal Completion Date: 12/19/22                Prior to Admission medications    Medication Sig Start Date End Date Taking? Authorizing Provider   insulin glargine (LANTUS SOLOSTAR) 100 UNIT/ML injection pen Inject 120 Units into the skin 2 times daily 8/30/22   Morenita Cat MD   Dulaglutide (TRULICITY) 1.21 IN/0.0KQ SOPN Inject 0.75 mg into the skin once a week 8/30/22   Morenita Cat MD   lacosamide (VIMPAT) 200 MG tablet Take 200 mg by mouth 2 times daily. Historical Provider, MD   gabapentin (NEURONTIN) 600 MG tablet Take 1 tablet by mouth 4 times daily for 90 days.  8/29/22 11/27/22 John Bro DO   insulin glargine [facility]) 100 UNIT/ML injection pen Inject 120 Units into the skin 2 times daily 8/29/22   John Bro DO   Dulaglutide 0.75 MG/0.5ML SOPN Inject 0.75 mg into the skin once a week 8/29/22   John Bro DO   lisinopril (PRINIVIL;ZESTRIL) 5 MG tablet Take 1 tablet by mouth daily 8/29/22   John Bro DO   metoprolol tartrate (LOPRESSOR) 25 MG tablet Take 1 tablet by mouth 2 times daily 8/29/22   John Bro DO   ARIPiprazole (ABILIFY) 15 MG tablet Take 10 mg by mouth daily Dose reduced by psychiatry 8/30/22    Historical Provider, MD   rOPINIRole (REQUIP) 1 MG tablet Take 1 tablet by mouth 3 times daily 7/4/22   Ander Blakely MD   promethazine (PHENERGAN) 12.5 MG tablet Take 1-2 tablets by mouth every 8 hours as needed for Nausea 6/20/22   Ander Blakely MD   aspirin 81 MG chewable tablet Take 1 tablet by mouth daily 6/15/22   Adam Flower Topper,    Insulin Pen Needle 32G X 4 MM MISC Use 5 times daily with insulin pens 5/18/22   Ander Blakely MD   vitamin D (ERGOCALCIFEROL) 1.25 MG (65810 UT) CAPS capsule Take 1 capsule by mouth once a week 4/26/22   Ander Blakley MD   lacosamide (VIMPAT) 200 MG tablet Take 1 tablet by mouth 2 times daily for 90 days.  4/7/22 8/16/22  Stas Figueroa MD   blood glucose test strips (ONETOUCH ULTRA) strip 1 each by In Vitro route 5 times daily DX: E11.65 Dispense Onetouch Ultra 2 test strips  Patient not taking: Reported on 8/29/2022 2/22/22   Ander Blakely MD   sucralfate (CARAFATE) 1 GM tablet Take 1 g by mouth 4 times daily    Historical Provider, MD   vitamin B-12 (CYANOCOBALAMIN) 1000 MCG tablet Take 1,000 mcg by mouth 2 times daily   Patient not taking: Reported on 8/31/2022    Historical Provider, MD   DULoxetine (CYMBALTA) 60 MG extended release capsule Take 120 mg by mouth daily    Historical Provider, MD   levETIRAcetam (KEPPRA) 1000 MG tablet Take 2 tablets by mouth 2 times daily  Patient taking differently: Take 1,000 mg by mouth 2 times daily 8/4/21   Bronson Rossi MD   insulin lispro (HUMALOG) 100 UNIT/ML injection vial Takes 25-45 units with meals plus personal sliding scale    Historical Provider, MD   pantoprazole (PROTONIX) 40 MG tablet Take 1 tablet by mouth 2 times daily 3/8/21   Juan Carlos Guerra MD       Future Appointments   Date Time Provider Staci Jay   9/19/2022  4:00 PM Debby Prakash [facility] SRPX Physic [place]   9/20/2022  8:00 AM Angie Mcconnell, APRN - CNP N SRPX Pain [facility] [place]   10/18/2022 10:00 AM Juan Carlos Guerra MD SRPX Physic [facility] [place]   12/28/2022  1:40 PM Evelyn President, MD Neuro Spec [facility]

## 2022-09-20 ENCOUNTER — OFFICE VISIT (OUTPATIENT)
Dept: PHYSICAL MEDICINE AND REHAB | Age: 52
End: 2022-09-20
Payer: MEDICARE

## 2022-09-20 VITALS
DIASTOLIC BLOOD PRESSURE: 90 MMHG | WEIGHT: 280 LBS | HEIGHT: 74 IN | BODY MASS INDEX: 35.94 KG/M2 | SYSTOLIC BLOOD PRESSURE: 168 MMHG

## 2022-09-20 DIAGNOSIS — M48.061 SPINAL STENOSIS OF LUMBAR REGION WITHOUT NEUROGENIC CLAUDICATION: Primary | ICD-10-CM

## 2022-09-20 DIAGNOSIS — M53.3 SI (SACROILIAC) JOINT DYSFUNCTION: ICD-10-CM

## 2022-09-20 DIAGNOSIS — M47.816 FACET HYPERTROPHY OF LUMBAR REGION: ICD-10-CM

## 2022-09-20 DIAGNOSIS — M53.3 SACROILIAC JOINT PAIN: ICD-10-CM

## 2022-09-20 DIAGNOSIS — G89.4 CHRONIC PAIN SYNDROME: ICD-10-CM

## 2022-09-20 DIAGNOSIS — G62.9 NEUROPATHY: ICD-10-CM

## 2022-09-20 DIAGNOSIS — M47.816 LUMBAR SPONDYLOSIS: ICD-10-CM

## 2022-09-20 PROBLEM — Q66.50 CONGENITAL PES PLANUS: Status: ACTIVE | Noted: 2022-09-20

## 2022-09-20 PROCEDURE — 3017F COLORECTAL CA SCREEN DOC REV: CPT | Performed by: NURSE PRACTITIONER

## 2022-09-20 PROCEDURE — 1036F TOBACCO NON-USER: CPT | Performed by: NURSE PRACTITIONER

## 2022-09-20 PROCEDURE — G8427 DOCREV CUR MEDS BY ELIG CLIN: HCPCS | Performed by: NURSE PRACTITIONER

## 2022-09-20 PROCEDURE — G8417 CALC BMI ABV UP PARAM F/U: HCPCS | Performed by: NURSE PRACTITIONER

## 2022-09-20 PROCEDURE — 99204 OFFICE O/P NEW MOD 45 MIN: CPT | Performed by: NURSE PRACTITIONER

## 2022-09-20 NOTE — PROGRESS NOTES
Chronic Pain/PM&R Clinic Note     Encounter Date: 9/20/22    Subjective:   Chief Complaint:   Chief Complaint   Patient presents with    New Patient     Foraminal stenosis of lumbar region         History of Present Illness:   Minh Jansen is a 46 y.o. male seen in the clinic initially on 09/20/2022 upon request from Dr Eden Madden for his history of low back pain. He has a personal medical history of DVT, HTN, liver disease, IBS, GERD, DMT2, MISTI, depression, suicide attempt, bipolar, seizures, neuropathy, charcot foot. Patient presents today with his left foot in a cast, utilizing crutches. He reports that he is seeing Dr. Samantha Eason at [facility], had surgery on bilateral feet and leg, left foot is still not healing well continues in a cast with plans for a bone stimulator to be put on today. He reports he he was told he could be in this cast for up to 1 year. He has complaints of bilateral low back pain, denies any radiation into the buttocks, thighs. He does report he has numbness, pins/needles in bilateral legs and knees down. That has been occurring for 10 years. He reports low back pain has been occurring for 3 months. Denies any accident, injury, falls that caused his pain. He describes the pain as a ache, stabbing. He states standing, moving around makes the pain worse. Nothing helps alleviate it. He has tried heat without relief. He cannot take over-the-counter NSAIDs or Tylenol due to stomach issues, blood sugar issues as well as liver disease. He denies any weakness, states he has had falls in the past.  He denies any loss of bowel or bladder control. He has does not try any home exercises, has not tried any formal physical therapy for the low back. He reports he has had epidurals into his back in the past at [facility], but it was years and years ago. He states it did help at that time. Pain scale : at worst pain is 9/10, at best pain is 4/10.      History of Interventions:   Surgery: No previous lumbar surgeries  Injections: Epidurals- LMH- with relief    Current Treatment Medications:   Neurontin- no relief     Historical Treatment Medications:   Percocet- for foot surgeriers with relief  Norco-for foot surgeries with relief    Imagin2022 CT Lumbar  PROCEDURE: CT LUMBAR SPINE WO CONTRAST       CLINICAL INFORMATION: Bilateral low back pain without sciatica, unspecified chronicity. COMPARISON: Plain radiographs dated 2019. TECHNIQUE: 3 mm noncontrast axial images were obtained of the lumbar spine. Sagittal and coronal reconstructions were obtained. Angled images were reconstructed through the L3-4, L4-5 and L5-S1 disc levels. All CT scans at this facility use dose modulation, iterative reconstruction, and/or weight-based dosing when appropriate to reduce radiation dose to as low as reasonably achievable. FINDINGS:       The lumbar vertebral bodies are normally aligned. There are no compression fractures. No pars defects are noted. No suspicious osseous lesions are present. There are no gross abnormalities within the spinal canal.       On the axial images, at T11-12 and T12-L1 and L1-L2, there is no disc herniation, canal or foraminal stenosis. At L2-3, there is a 1.9 mm bulging disc and facet hypertrophy. This causes mild canal and mild-to-moderate bilateral foraminal stenosis. At L3-4, there is a 1.8 mm bulging disc and facet hypertrophy. This causes mild canal and mild-to-moderate bilateral foraminal stenosis. At L4-5, there is a 2.3 mm bulging disc and facet hypertrophy. This causes mild canal and mild-to-moderate bilateral foraminal stenosis. At L5-S1, there is a 2.2 mm bulging disc and a sclerotic left facet joint . This causes mild canal and mild-to-moderate bilateral foraminal stenosis. There is degenerative change involving the sacroiliac joints bilaterally. .       There is a filter within the inferior vena cava. There is atherosclerotic calcification in the abdominal aorta. Impression       1. Degenerative changes resulting in mild canal and mild-to-moderate bilateral foraminal stenosis at L2-3, L3-4, L4-5 and L5-S1.   2. Degenerative change involving the sacroiliac joints bilaterally. 3. Filter within the inferior vena cava. 4. Atherosclerotic calcification in the abdominal aorta. Past Medical History:   Diagnosis Date    Abnormal thyroid biopsy     s/p left hemithyroidectomy 6/8327 -benign follicular adenoma    Acid reflux     Anemia     resolved - previously seen by Dr. Renny Grover (due to enlarged spleen)    Anesthesia     seizures with brain surgery due to blood sugar got really high    Bile reflux gastritis     per EGD 11/16/21 - Dr. Sondra Martínez - to start Carafate. Bipolar disorder ([facility]     Blood clot in vein 04/07/2016    Nancymarty Lampatience     Chest pain     previously seeing [facility] cardiologist, now seeing Dr. Shawna Mederos (LAD bridging on cath 4/2016)    Chronic back pain     Chronic bronchitis ([facility]     Dr. eDon Barclay polyp 08/30/2016    Depression     seeing BREANN    DVT (deep venous thrombosis) ([facility] [id number]    not on Coumadin due to unable to regulate - Dr. Souleymane Nash - no etiology found per patient    Esophageal abnormality     nodule     Fatty liver disease, nonalcoholic     U/S 15/33[facility]    Follicular adenoma of thyroid gland 01/15/2021    s/p lobectomy    Furuncle     legs    History of Doppler ultrasound 05/29/2011    No hemodynamically significant carotid stenosis is identified. A thyroid nodule on each side. Dedicated ultrasound of thyroid gland is suggested to further evaluate if clinically indicated.      History of pulmonary embolism 2007    s/p GFF, related to knee surgery    Hx of blood clots     leg and lung    Hyperlipidemia     severely elevated triglycerides    Hypertension     diastolic    Intracranial arachnoid cyst 11/2012    Dr. Jessica Goodman drained, complicated with seizures, DKA    Irritable bowel syndrome     Liver disease     August Peat - elevated LFT - positive smooth muscle antibody, steatosis per liver bx 3/2014 with Dr. Gisela Curtis (multiple drug resistant organisms) resistance 2012    MRSA (methicillin resistant staph aureus) culture positive     h/o in foot and before brain surgery    Nephrolithiasis     noted on CT abdomen 9/2016, 6/2019    Neuropathy     Pancreatic insufficiency     Positive SANDY (antinuclear antibody)     Dr. Jennifer Clarke - first visit in 6/2013    Prolonged emergence from general anesthesia     Restless legs syndrome     Seizures ([address]     started with brain surgery    Sinus tachycardia     Holter 3/2013 - seeing Dr. Yonatan Caceres    Skull fracture [facility])     clips     Sleep apnea     positive sleep apnea per study 10/2020.     Systolic dysfunction     \"systolic bridge\"  EF 34% ECHO 9/2019    Type II or unspecified type diabetes mellitus without mention of complication, not stated as uncontrolled 2007       Past Surgical History:   Procedure Laterality Date    CARDIAC CATHETERIZATION      2011,5-6 years ago    CARDIAC CATHETERIZATION  03/2012    CARDIAC CATHETERIZATION  04/28/2016    [address] RELEASE  01/2017    CHOLECYSTECTOMY  2004    COLONOSCOPY  2012    COLONOSCOPY  08/2016    2 tubular adenomas - reportedly needs repeat scope in 6 months    CRANIOTOMY  11/2012    arachnoid cyst drainage    EKG 12-LEAD  10/18/2015         ENDOSCOPY, COLON, DIAGNOSTIC      FOOT SURGERY  05/20/2022    right foot metatarsal phalangeal joint resection and placement of antibiotic beads, left foot Achilles tendon lengthening    HERNIA REPAIR Right 1996    [facility] Right 09/15/2016    Robotic assisted    KNEE ARTHROSCOPY Right 2016    KNEE SURGERY Left 2007    acl and debrided twice    OTHER SURGICAL HISTORY  05/31/2011    Tilt table was associated w/ nonspecific symptoms or nausea, otherwise no significant dizziness or syncope. No significant hemodynamic changes. Otherwise, unremarkable tilt table test after 30 minutes of tilting. OTHER SURGICAL HISTORY  01/20/2017    RIGHT SHOULDER ARTHROSCOPY, OPEN STAN, OPEN ACROMIOPLASTY, BICEP TENDESIS, RIGHT CARPAL TUNNEL RELEASE    SHOULDER SURGERY Right 01/2007    THYROID LOBECTOMY N/A 01/15/2021    THYROID LOBECTOMY, UVULOPALATOPHARYNGOPLAST LAOP performed by Kesha Washington MD at [facility] ECHOCARDIOGRAM  03/05/2011    LV size and systolic function normal. EF 55-65%.      UPPER GASTROINTESTINAL ENDOSCOPY  2012    UPPER GASTROINTESTINAL ENDOSCOPY  2016    Dr. Jona Glasgow Left 10/22/2019    EGD DILATION SAVORY performed by Yair Pretty MD at [address] ENDOSCOPY Left 10/22/2019    EGD BIOPSY performed by Yair Pretty MD at [facility]    UPPER GASTROINTESTINAL ENDOSCOPY N/A 11/16/2021    EGD performed by Yair Pretty MD at [place] [address] Left 11/16/2021    EGD BIOPSY performed by Yair Pretty MD at [address] [place] [address]  2007       Family History   Problem Relation Age of Onset    Heart Disease Father 61        MI    Stroke Brother     Obesity Brother     Arthritis Mother     Seizures Mother     Obesity Sister     Other Brother         pulmonary embolism    Diabetes Daughter     Seizures Brother          Medications & Allergies:   Current Outpatient Medications   Medication Instructions    ARIPiprazole (ABILIFY) 10 mg, Oral, DAILY, Dose reduced by psychiatry 8/30/22    aspirin 81 mg, Oral, DAILY    Basaglar KwikPen 120 Units, SubCUTAneous, 2 TIMES DAILY    blood glucose test strips (ONETOUCH ULTRA) strip 1 each, In Vitro, 5 TIMES DAILY, DX: E11.65 Dispense Onetouch Ultra 2 test strips    Dulaglutide 0.75 mg, SubCUTAneous, WEEKLY    DULoxetine (CYMBALTA) 120 mg, Oral, DAILY    gabapentin (NEURONTIN) 600 mg, Oral, 4 TIMES DAILY    insulin lispro (HUMALOG) 100 UNIT/ML injection vial Takes 40 units with meals plus personal sliding scale    Insulin Pen Needle 32G X 4 MM MISC Use 5 times daily with insulin pens    lacosamide (VIMPAT) 200 mg, Oral, 2 TIMES DAILY    levETIRAcetam (KEPPRA) 2,000 mg, Oral, 2 TIMES DAILY    lisinopril (PRINIVIL;ZESTRIL) 5 mg, Oral, DAILY    metoprolol tartrate (LOPRESSOR) 25 mg, Oral, 2 TIMES DAILY    pantoprazole (PROTONIX) 40 mg, Oral, 2 TIMES DAILY    promethazine (PHENERGAN) 12.5-25 mg, Oral, EVERY 8 HOURS PRN    rOPINIRole (REQUIP) 1 MG tablet Take 1 tablet by mouth 3 times daily    sucralfate (CARAFATE) 1 g, Oral, 4 TIMES DAILY    vitamin D (ERGOCALCIFEROL) 50,000 Units, Oral, WEEKLY       Allergies   Allergen Reactions    Ciprofloxacin-Ciproflox Hcl Er Other (See Comments)     Elevated creatinine    Heparin      Monitor for thrombocytopenia    Metformin Nausea Only     Abdominal pain, diarrhea    Niacin And Related Other (See Comments)     Burning sensation, severe flushing    Tramadol Hcl      Contraindication to seizure medications    Ultram [Tramadol]      Contraindication to seizure medications    Warfarin      Very difficult to regulate in past    Other      Other reaction(s): very difficult to regulate    Tylenol [Acetaminophen] Other (See Comments)     Interferes with CGM        Review of Systems:   Constitutional: negative for weight changes or fevers  Genitourinary: negative for bowel/bladder incontinence   Musculoskeletal: positive for low back pain  Neurological: negative for any leg weakness, bilateral leg numbness/tingling  Behavioral/Psych: negative for anxiety/depression   All other systems reviewed and are negative    Objective:     Vitals:    09/20/22 0745   BP: (!) 168/90       Constitutional: Pleasant, no acute distress   Head: Normocephalic, atraumatic   Eyes: Conjunctivae normal   Neck: Supple, symmetrical   Lungs: Normal respiratory effort, non-labored breathing Cardiovascular: Limbs warm and well perfused   Abdomen: Non-protruded   Musculoskeletal: Muscle bulk symmetric, no atrophy, no gross deformities   · Lower Extremities: ROM reduced left lower leg  · Lumbar Spine: ROM WNL. Lumbar paraspinals non-tender to palpation bilaterally. SLR pos bilaterally. YEVGENIY pos bilaterally. GAENSLEN pos bilaterally. positive facet loading bilaterally. Bilateral SI joints tender to palpation. SI Distraction maneuver positive on left/right side. Bilateral greater trochanters non-tender to palpation. Neurological: Cranial nerves II-XII grossly intact. · Gait - antalgic gait. Ambulates with assistive device, left lower leg in cast.   · Motor: 5/5 muscle strength in right hip flexion, knee flexion, knee extension, ankle dorsiflexion, and ankle plantar flexion. 4/5 left hip flexion, knee ext/flex, unable to assess ankle strength d/t cast  · Sensory: decreased sensation in right lower leg, unable to assess left  Skin: No rashes or lesions present   Psychological: Cooperative, no exaggerated pain behaviors       Assessment:    Diagnosis Orders   1. Spinal stenosis of lumbar region without neurogenic claudication  Ambulatory referral to Physical Therapy      2. Facet hypertrophy of lumbar region  Ambulatory referral to Physical Therapy      3. Lumbar spondylosis  Ambulatory referral to Physical Therapy      4. Sacroiliac joint pain  Ambulatory referral to Physical Therapy      5. SI (sacroiliac) joint dysfunction  Ambulatory referral to Physical Therapy      6. Neuropathy  Ambulatory referral to Physical Therapy      7. Chronic pain syndrome  Ambulatory referral to Physical Therapy           Zay Velarde is a 46 y.o.male presenting to the pain clinic for evaluation of low  back pain. He is presently in a left lower leg cast and ambulates with crutches. Discussed formal physical therapy to assist with strengthening and pain control.  Possible procedures in the future such as : lumbar facet series, SI injections, LESI. Will see him back in 10-12 weeks to re-evaluate. Discussed he cannot take NSAIDs or Tylenol and not a candidate for narcotics. Plan: The following treatment recommendations and plan were discussed in detail with Elijah Coley. Imaging:   I have reviewed patients imaging of lumbar CT and results were discussed with patient today. Analgesics: The patient is currently managing their pain with the use of nerurontin which is prescribed by PCP. We recommend that the patient follow the recommendation of the prescribing provider with regard to the above medication(s) and contact their prescribing provider for refills if needed. Adjuvants:   None    Interventions:   None    Anticoagulation/NPO Recommendations:   None    Multidisciplinary Pain Management:   In the presence of complex, chronic, and multi-factorial pain, the importance of a multidisciplinary approach to pain management in the patients management regimen was emphasized and discussed in great detail. PHYSICAL THERAPY: Patient is advised to see a physical therapist for gentle stretching exercises and conditioning exercises for management of pain. PSYCHOLOGY: Patient is advised to see a clinical pain psychologist, for the psychosocial aspect of pain care through coping skills, relaxation strategies, cognitive group therapy etc.   OBESITY: Patient is advised to seek nutrition consult to help in managing their weight to decrease its impact on pain. SMOKING: Impact of smoking in patient's pain was discussed and patient is counseled against smoking. The patient was also advised to continue physical therapy and stretching exercises at home and cognitive behavioral and/or group therapy. Referrals:  [facility] Physical therapy     Prescriptions Written This Visit:   None    Follow-up:   10-12 weeks, after therapy     It was my pleasure to evaluate Elijah Coley today.   I spent over 50 minutes evaluating this patient, reviewing previous notes and images and completing documentation.        NICANOR Ma - CNP   Interventional Pain Management/PM&R   [place]

## 2022-09-23 ENCOUNTER — CARE COORDINATION (OUTPATIENT)
Dept: CARE COORDINATION | Age: 52
End: 2022-09-23

## 2022-09-23 NOTE — CARE COORDINATION
Resending the application for assistance on [employer] in hopes the patient will return the signed application along with proof of income. Once he has returned this information to me I will be able to submit to the company for assistance.       [facility]   Medication Assistance  [address] [place] [address] and [employer]    E) [phone] (R) [phone]

## 2022-09-28 ENCOUNTER — CARE COORDINATION (OUTPATIENT)
Dept: CARE COORDINATION | Age: 52
End: 2022-09-28

## 2022-09-28 NOTE — CARE COORDINATION
Attempted to reach Celestine Romero today for care coordination f/u. No answer. Message left to return call.

## 2022-10-06 ENCOUNTER — HOSPITAL ENCOUNTER (OUTPATIENT)
Dept: PHYSICAL THERAPY | Age: 52
Setting detail: THERAPIES SERIES
Discharge: HOME | End: 2022-10-06
Payer: MEDICARE

## 2022-10-06 PROCEDURE — 97110 THERAPEUTIC EXERCISES: CPT

## 2022-10-06 PROCEDURE — 97162 PT EVAL MOD COMPLEX 30 MIN: CPT

## 2022-10-06 NOTE — PROGRESS NOTES
** PLEASE SIGN, DATE AND TIME CERTIFICATION BELOW AND RETURN TO [facility] OUTPATIENT REHABILITATION (FAX #: [phone]). ATTEST/CO-SIGN IF ACCESSING VIA INFocus. THANK YOU.**    I certify that I have examined the patient below and determined that Physical Medicine and Rehabilitation service is necessary and that I approve the established plan of care for up to 90 days or as specifically noted. Attestation, signature or co-signature of physician indicates approval of certification requirements.    ________________________ ____________ __________  Physician Signature   Date   Time  [address] [facility]  PHYSICAL THERAPY  [x] EVALUATION  [] DAILY NOTE (LAND) [] DAILY NOTE (AQUATIC ) [] PROGRESS NOTE [] DISCHARGE NOTE    [x] [address]   [] [place] [address]    [] [facility]   [] [facility]    Date: 10/6/2022  Patient Name:  Elle Moses  : 1970  MRN: [id number]  CSN: [id number]    Referring Practitioner Oval DORYS Bernard*   Diagnosis Spinal stenosis, lumbar region without neurogenic claudication [M48.061]  Spondylosis without myelopathy or radiculopathy, lumbar region [M47.816]  Spondylosis without myelopathy or radiculopathy, lumbar region [M47.816]    Treatment Diagnosis Chronic lumbar pain, decreased hip ROM, core and BLE weakness, impaired balance, difficulty walking   Date of Evaluation 10/6/22    Additional Pertinent History DVT, HTN, liver disease, IBS, GERD, DMT2, MISTI, depression, suicide attempt, bipolar, seizures, neuropathy, charcot foot with surgery on left foot since May, obesity, memory issues. Pt will be placed in permanent boot on 10/13/22. Pt uses bone stimulator 5 hours/day.        Functional Outcome Measure Used Modified Oswestry   Functional Outcome Score 19/50= 38% (10/6/22)       Insurance: Primary: Payor: Ca Ashley /  /  / ,   Secondary:    Authorization Information: PRECERTIFICATION REQUIRED: No  INSURANCE THERAPY BENEFIT:  Patient has unlimited visits based on medical necessity  Benefit will not cover maintenance or preventative treatment. AQUATIC THERAPY COVERED:   Yes  MODALITIES COVERED:  Yes, with the exception of iontophoresis and hot packs/cold packs  TELEHEALTH COVERED: Yes   Visit # 1, 1/10 for progress note   Visits Allowed: unlimited   Recertification Date: 64/92/80   Physician Follow-Up: 11/29/22; neurosurgeon Dr. Vijay Goetz 10/19/22   Physician Orders:    History of Present Illness: Pt presents with chronic lumbar pain. Years ago, pt had injections in back which seemed to help. Pain has progressively worsened since July and pt feels its due to wearing the boot. Pain aggravated with standing and walking >5 minutes, lifting and cleaning. Pain has started radiating into lower buttocks ~1 week ago. Pain alleviated with rest/sitting. Pt denies taking pain medication and unable to take Tylenol or ibuprofen d/t stomach issues. Pt uses heating pad without relief. SUBJECTIVE: Pt c/o low back pain radiating into buttocks. Social/Functional History and Current Status:  Medications and Allergies have been reviewed and are listed on Medical History Questionnaire. Michelle Collado lives with significant other in a single story home with  small step to enter.     Task Previous Current   ADLs  Independent Independent   IADL's Independent Modified Independent- takes frequent breaks with household chores   Ambulation Independent Independent- supposed to be using knee scooter but has fallen off of it multiple times, not supposed to be weight bearing on [address]- fishing, camping Dependent/Unable   [address]- uses motorized cart at store   Driving Active  Active    Work On Disability  On Disability     Objective:    OBSERVATION   Pain 5/10 low back radiating into buttocks   Palpation buttocks    \"X in shaded column indicates activity completed today    *\" next to exercise/intervention indicates progression   Modalities Parameters/  Location  Notes                     Manual Therapy Time/Technique  Notes                     Exercise/Intervention   Notes   LTR 5x10 sec  x More pulling in right with knees to left   SKTC with towel 5x10 sec  x Cues for gentle stretch          Abdominal bracing 5x5 sec  x painful   Posterior pelvic tilts   x Attempted but very painful                                               Specific Interventions Next Treatment: IFC estim with MHP, manual as needed, hip stretching as tolerance, core and BLE strengthening, balance training when released by foot doctor to weight bear    Activity/Treatment Tolerance:  [x]  Patient tolerated treatment well  []  Patient limited by fatigue  []  Patient limited by pain   []  Patient limited by medical complications  []  Other:     Assessment: 46year old presents with worsening chronic lumbar pain. Within the last week, pain has started radiating into buttocks. Pt very restricted in hips with significant core and BLE weakness. Pt has poor standing and walking tolerance, which make household chores difficulty to perform. Pt ambulates without AD, despite reporting need for NWB on LLE. Pt notes balance issues affecting his ability to use roll-about. Pt has complex medical history, as stomach issues don't allow him to take pain medication, and will be placed in permanent boot for charcot foot, all which may limit his progress. Pt will benefit from skilled PT to address listed impairments to reduce pain and improve ROM, strength and mobility.      Body Structures/Functions/Activity Limitations: impaired activity tolerance, impaired balance, impaired ROM, impaired strength, pain, and abnormal gait  Prognosis: fair      Goals    Patient Goal: Alleviate pain    Short Term Goals: 4 weeks  Patient will tolerate standing and walking >10 minutes without increased pain for ease managing things around the house. Patient will demonstrate good core engagement and postural awareness during therapy session with <3 cues  to decrease spinal strain from cooking and cleaning. Patient will have <50% bilateral hip IR and ER AROM restriction to improve flexibility for ADLs. Long Term Goals: 12 weeks  Patient will demonstrate good core engagement and postural awareness during therapy session without cues to tolerate doing laundry. Patient will improve BLE strength to 4-/5 for stabilization when standing to do dishes and squatting to retrieve objects. Patient will hold bilateral SLR x8 seconds with good core engagement to improve stabilization when lifting. Patient will be independent and compliant with HEP to achieve above goals. Patient Education:   [x]  HEP/Education Completed: Plan of Care, Goals, attendance policy, exercises completed above with handout provided  FREEjit Access Code:  []  No new Education completed  []  Reviewed Prior HEP      [x]  Patient verbalized and/or demonstrated understanding of education provided. []  Patient unable to verbalize and/or demonstrate understanding of education provided. Will continue education. []  Barriers to learning:     PLAN:  Treatment Recommendations: Strengthening, Range of Motion, Balance Training, Functional Mobility Training, Manual Therapy - Soft Tissue Mobilization, Pain Management, Home Exercise Program, Patient Education, Aquatics, and Modalities    [x]  Plan of care initiated. Plan to see patient 1-2 times per week for 8 weeks to address the treatment planned outlined above.   []  Continue with current plan of care  []  Modify plan of care as follows:    []  Hold pending physician visit  []  Discharge    Time In 0823   Time Out 0910   Timed Code Minutes: 8 min   Total Treatment Time: 47 min     Electronically Signed by: Gabriele Calvillo PT

## 2022-10-11 ENCOUNTER — CARE COORDINATION (OUTPATIENT)
Dept: CARE COORDINATION | Age: 52
End: 2022-10-11

## 2022-10-11 ENCOUNTER — HOSPITAL ENCOUNTER (OUTPATIENT)
Dept: PHYSICAL THERAPY | Age: 52
Setting detail: THERAPIES SERIES
Discharge: HOME | End: 2022-10-11
Payer: MEDICARE

## 2022-10-11 PROCEDURE — 97110 THERAPEUTIC EXERCISES: CPT

## 2022-10-11 PROCEDURE — 97032 APPL MODALITY 1+ESTIM EA 15: CPT

## 2022-10-11 NOTE — PROGRESS NOTES
[address] [facility]  PHYSICAL THERAPY  [] EVALUATION  [x] DAILY NOTE (LAND) [] DAILY NOTE (AQUATIC ) [] PROGRESS NOTE [] DISCHARGE NOTE    [x] OUTPATIENT REHABILITATION [facility]   [] [place] [address]    [] [facility]   [] [facility]    Date: 10/11/2022  Patient Name:  Meg Su  : 1970  MRN: [id number]  CSN: [id number]    Referring Practitioner DORYS Pro*   Diagnosis Spinal stenosis, lumbar region without neurogenic claudication [M48.061]  Spondylosis without myelopathy or radiculopathy, lumbar region [M47.816]  Spondylosis without myelopathy or radiculopathy, lumbar region [M47.816]    Treatment Diagnosis Chronic lumbar pain, decreased hip ROM, core and BLE weakness, impaired balance, difficulty walking   Date of Evaluation 10/6/22    Additional Pertinent History DVT, HTN, liver disease, IBS, GERD, DMT2, MISTI, depression, suicide attempt, bipolar, seizures, neuropathy, charcot foot with surgery on left foot since May, obesity, memory issues. Pt will be placed in permanent boot on 10/13/22. Pt uses bone stimulator 5 hours/day. Functional Outcome Measure Used Modified Oswestry   Functional Outcome Score 50= 38% (10/6/22)       Insurance: Primary: Payor: MEDICARE /  /  / ,   Secondary:    Authorization Information: PRECERTIFICATION REQUIRED:  No  INSURANCE THERAPY BENEFIT:  Patient has unlimited visits based on medical necessity  Benefit will not cover maintenance or preventative treatment. AQUATIC THERAPY COVERED:   Yes  MODALITIES COVERED:  Yes, with the exception of iontophoresis and hot packs/cold packs  TELEHEALTH COVERED: Yes   Visit # 2, 2/10 for progress note   Visits Allowed: unlimited   Recertification Date:    Physician Follow-Up: 22; neurosurgeon Dr. Alen Packer 10/19/22   Physician Orders:    History of Present Illness: Pt presents with chronic lumbar pain.  Years ago, pt had injections in back which seemed to help. Pain has progressively worsened since July and pt feels its due to wearing the boot. Pain aggravated with standing and walking >5 minutes, lifting and cleaning. Pain has started radiating into lower buttocks ~1 week ago. Pain alleviated with rest/sitting. Pt denies taking pain medication and unable to take Tylenol or ibuprofen d/t stomach issues. Pt uses heating pad without relief. SUBJECTIVE: Pt reports 5/10 pain and radicular symptoms into buttocks. States he has new doctor appts and unable to attend fridays at this time. TREATMENT   Precautions: NWB LLE but pt not compliant   Pain: 5/10 low back radiating into buttocks    \"X in shaded column indicates activity completed today    *\" next to exercise/intervention indicates progression   Modalities Parameters/  Location  Notes   IFC w/MHP* 11 min, 8.0 V  x Skin intact pre/post treatment assessment, no signs of adverse reactions  Discontinued early d/t pt noting pain                Manual Therapy Time/Technique  Notes                     Exercise/Intervention   Notes   LTR 5x10 sec  x More pulling in right with knees to left   SKTC with towel 5x10 sec  x Cues for gentle stretch          Abdominal bracing 10*x5 sec  x painful   Posterior pelvic tilts   x Attempted but very painful                                               Specific Interventions Next Treatment: IFC estim with MHP, manual as needed, hip stretching as tolerance, core and BLE strengthening, balance training when released by foot doctor to weight bear    Activity/Treatment Tolerance:  []  Patient tolerated treatment well  []  Patient limited by fatigue  [x]  Patient limited by pain   []  Patient limited by medical complications  []  Other:     Assessment: Pt scheduled for shorten treatment session. Initiated IFC with MHP to LB for pain management. Pt tolerated IFC electrical stimulation poorly and stated \"its kind of painful\".  Therapist discontinued e-stim, pt request to attempt again at later date. Pt stated \"those stretches did not help last time. \" Pt noted no change in pain from start of treatment to conclusion. Will continue to progress as tolerated by pt. Body Structures/Functions/Activity Limitations: impaired activity tolerance, impaired balance, impaired ROM, impaired strength, pain, and abnormal gait  Prognosis: fair      Goals    Patient Goal: Alleviate pain    Short Term Goals: 4 weeks  Patient will tolerate standing and walking >10 minutes without increased pain for ease managing things around the house. Patient will demonstrate good core engagement and postural awareness during therapy session with <3 cues  to decrease spinal strain from cooking and cleaning. Patient will have <50% bilateral hip IR and ER AROM restriction to improve flexibility for ADLs. Long Term Goals: 12 weeks  Patient will demonstrate good core engagement and postural awareness during therapy session without cues to tolerate doing laundry. Patient will improve BLE strength to 4-/5 for stabilization when standing to do dishes and squatting to retrieve objects. Patient will hold bilateral SLR x8 seconds with good core engagement to improve stabilization when lifting. Patient will be independent and compliant with HEP to achieve above goals. Patient Education:   []  HEP/Education Completed: Plan of Care, Goals, attendance policy, exercises completed above with handout provided  YouWeb Access Code:  [x]  No new Education completed  []  Reviewed Prior HEP      [x]  Patient verbalized and/or demonstrated understanding of education provided. []  Patient unable to verbalize and/or demonstrate understanding of education provided. Will continue education.   []  Barriers to learning:     PLAN:  Treatment Recommendations: Strengthening, Range of Motion, Balance Training, Functional Mobility Training, Manual Therapy - Soft Tissue Mobilization, Pain Management, Home Exercise Program, Patient Education, Aquatics, and Modalities    []  Plan of care initiated. Plan to see patient 1-2 times per week for 8 weeks to address the treatment planned outlined above.   [x]  Continue with current plan of care  []  Modify plan of care as follows:    []  Hold pending physician visit  []  Discharge    Time In 1229   Time Out 1253   Timed Code Minutes: 24   Total Treatment Time: 24     Electronically Signed by: Erlin Juan PTA

## 2022-10-14 ENCOUNTER — APPOINTMENT (OUTPATIENT)
Dept: PHYSICAL THERAPY | Age: 52
End: 2022-10-14
Payer: MEDICARE

## 2022-10-17 ENCOUNTER — HOSPITAL ENCOUNTER (OUTPATIENT)
Dept: PHYSICAL THERAPY | Age: 52
Setting detail: THERAPIES SERIES
Discharge: HOME | End: 2022-10-17
Payer: MEDICARE

## 2022-10-17 PROCEDURE — 97110 THERAPEUTIC EXERCISES: CPT

## 2022-10-17 PROCEDURE — G0283 ELEC STIM OTHER THAN WOUND: HCPCS

## 2022-10-17 NOTE — PROGRESS NOTES
[address] [facility]  PHYSICAL THERAPY  [] EVALUATION  [x] DAILY NOTE (LAND) [] DAILY NOTE (AQUATIC ) [] PROGRESS NOTE [] DISCHARGE NOTE    [x] OUTPATIENT REHABILITATION [facility]   [] [place] [address]    [] [facility]   [] [facility]    Date: 10/17/2022  Patient Name:  Taylor Lopez  : 1970  MRN: [id number]  CSN: [id number]    Referring Practitioner VERITO Hudson   Diagnosis Spinal stenosis, lumbar region without neurogenic claudication [M48.061]  Spondylosis without myelopathy or radiculopathy, lumbar region [M47.816]  Spondylosis without myelopathy or radiculopathy, lumbar region [M47.816]    Treatment Diagnosis Chronic lumbar pain, decreased hip ROM, core and BLE weakness, impaired balance, difficulty walking   Date of Evaluation 10/6/22    Additional Pertinent History DVT, HTN, liver disease, IBS, GERD, DMT2, MISTI, depression, suicide attempt, bipolar, seizures, neuropathy, charcot foot with surgery on left foot since May, obesity, memory issues. Pt will be placed in permanent boot on 10/13/22. Pt uses bone stimulator 5 hours/day. Functional Outcome Measure Used Modified Oswestry   Functional Outcome Score 19/50= 38% (10/6/22)       Insurance: Primary: Payor: MEDICARE /  /  / ,   Secondary:    Authorization Information: PRECERTIFICATION REQUIRED:  No  INSURANCE THERAPY BENEFIT:  Patient has unlimited visits based on medical necessity  Benefit will not cover maintenance or preventative treatment. AQUATIC THERAPY COVERED:   Yes  MODALITIES COVERED:  Yes, with the exception of iontophoresis and hot packs/cold packs  TELEHEALTH COVERED: Yes   Visit # 3, 3/10 for progress note   Visits Allowed: unlimited   Recertification Date:    Physician Follow-Up: 22; neurosurgeon Dr. Sandra Holloway 10/19/22   Physician Orders:    History of Present Illness: Pt presents with chronic lumbar pain.  Years ago, pt had injections in back which seemed to help. Pain has progressively worsened since July and pt feels its due to wearing the boot. Pain aggravated with standing and walking >5 minutes, lifting and cleaning. Pain has started radiating into lower buttocks ~1 week ago. Pain alleviated with rest/sitting. Pt denies taking pain medication and unable to take Tylenol or ibuprofen d/t stomach issues. Pt uses heating pad without relief. SUBJECTIVE: Patient states he as been feeling about the same. Not getting much relief at this time. Rates current pain 5/10. Reports compliance with HEP. TREATMENT   Precautions: NWB LLE but pt not compliant   Pain: 5/10 low back radiating into buttocks    \"X in shaded column indicates activity completed today    *\" next to exercise/intervention indicates progression   Modalities Parameters/  Location  Notes   IFC w/MHP 15 min, 6.5 V  x Skin intact pre/post treatment assessment, no signs of adverse reactions  Tolerated treatment today               Manual Therapy Time/Technique  Notes                     Exercise/Intervention   Notes   LTR 5x10 sec  x More pulling in right with knees to left   SKTC with towel 5x10 sec  x Cues for gentle stretch   Piriformis stretch* 5x10 sec  x           Abdominal bracing 10x5 sec  x painful   Posterior pelvic tilts   x Attempted but very painful                                               Specific Interventions Next Treatment: IFC estim with MHP, manual as needed, hip stretching as tolerance, core and BLE strengthening, balance training when released by foot doctor to weight bear    Activity/Treatment Tolerance:  []  Patient tolerated treatment well  []  Patient limited by fatigue  [x]  Patient limited by pain   []  Patient limited by medical complications  []  Other:     Assessment: Patient started treatment with Estim IFC with MHP to lower lumbar area for pain. Patient noted it was more tolerable at lower intensity.  Patient completed exercises as listed above working on flexibility and core stability exercises. Patient had complaints of pain with exercises limiting progression. Will continue to treat as tolerated by patient. Goals    Patient Goal: Alleviate pain    Short Term Goals: 4 weeks  Patient will tolerate standing and walking >10 minutes without increased pain for ease managing things around the house. Patient will demonstrate good core engagement and postural awareness during therapy session with <3 cues  to decrease spinal strain from cooking and cleaning. Patient will have <50% bilateral hip IR and ER AROM restriction to improve flexibility for ADLs. Long Term Goals: 12 weeks  Patient will demonstrate good core engagement and postural awareness during therapy session without cues to tolerate doing laundry. Patient will improve BLE strength to 4-/5 for stabilization when standing to do dishes and squatting to retrieve objects. Patient will hold bilateral SLR x8 seconds with good core engagement to improve stabilization when lifting. Patient will be independent and compliant with HEP to achieve above goals. Patient Education:   []  HEP/Education Completed: Patient given updated handouts for HEP. Stumpwise Access Code: [email or web address]  [x]  No new Education completed  []  Reviewed Prior HEP      [x]  Patient verbalized and/or demonstrated understanding of education provided. []  Patient unable to verbalize and/or demonstrate understanding of education provided. Will continue education. []  Barriers to learning:     PLAN:  Treatment Recommendations: Strengthening, Range of Motion, Balance Training, Functional Mobility Training, Manual Therapy - Soft Tissue Mobilization, Pain Management, Home Exercise Program, Patient Education, Aquatics, and Modalities    []  Plan of care initiated. Plan to see patient 1-2 times per week for 8 weeks to address the treatment planned outlined above.   [x]  Continue with current plan of care  []  Modify plan of care as follows: []  Hold pending physician visit  []  Discharge    Time In 1050   Time Out 1130   Timed Code Minutes: 40 min   Total Treatment Time: 40 min     Electronically Signed by: Reagan Webb PTA

## 2022-10-19 ENCOUNTER — TELEPHONE (OUTPATIENT)
Dept: INTERNAL MEDICINE CLINIC | Age: 52
End: 2022-10-19

## 2022-10-19 ENCOUNTER — CARE COORDINATION (OUTPATIENT)
Dept: CARE COORDINATION | Age: 52
End: 2022-10-19

## 2022-10-19 NOTE — TELEPHONE ENCOUNTER
Notified by Shelton Riley that [facility] is out of Trulicity 7.77RN and missed last week's dose. He reports that he hasn't heard anything from [employer] yet. Recompleted application for Trulicity 9.6JS through [employer] (dose increase). Given single missed dose last week, sample provided for Trulicity 1.5 mg weekly #2 pens (Lot: [id number] Exp:4/11/2024). Also, no samples of Trulicity 4.33TI available. Patient verbalized understanding of the above. He felt that Trulicity was improving his blood sugars.        For [address] [place] [address] in place:  Yes  Recommendation Provided To: Patient/Caregiver: 2 via Telephone  Intervention Detail: Patient Access Assistance/Sample Provided  Gap Closed?: No   Intervention Accepted By: Patient/Caregiver: 2  Time Spent (min): [address], PharmD, [facility]  Internal Medicine Clinical Pharmacist  [phone]

## 2022-10-19 NOTE — CARE COORDINATION
Ambulatory Care Coordination Note  10/19/2022    ACC: Junior Richardson, RN    Jenny Sánchez is being followed by care coordination for education and assistance in managing his chronic conditions and healthcare needs. Spoke with Dhara today. Pt has h/o:  Dhara has h/o: Diabetes, DVT, Charcot's joint, Bipolar, Seizures, HTN, GERD, IBS. spoke with Dhara today for f/u. Pt reports that he is doing about the same. Charcot's joint-following with Dr. Randall Brown. Has an appt on Monday. Just placed in [address] today. Reports that it's painful being in the boot. Pt states that he was told he would have to wear it for good. Has bone sitmulator that he continues to use daily. Back pain-going to pain mgmt/PM&R. Currently getting outpt PT. Does not feel it is helping. Has appt with neurosurgery-Dr. Emerita Herrera today  Diabetes: monitoring BS's with CGM. Following with diabetes clinic. BS's averaging in low 200's. Still having some low at times. Mainly occurring when he has taken his insulin and then isn't able to eat  d/t nausea. Pt reports that he has not had any seizures in a few wks. Pt has yet to complete paperwork for med. Assistance. Stressed to pt to complete ASAP. Offered patient enrollment in the Remote Patient Monitoring (RPM) program for in-home monitoring: NA.  Plan of care:  F/u with specialists as scheduled  Wear boot as instructed  Continue monitoring BS's closely  Continue working with diabetic clinic  Complete application for med assistance  Spoke with Juana Gallegos will speak with [facility] pharmacist to discuss medications   Diabetes Assessment    Medic Alert ID: No  Meal Planning: Avoidance of concentrated sweets, Carb counting   How often do you test your blood sugar?: Meals   Do you have barriers with adherence to non-pharmacologic self-management interventions?  (Nutrition/Exercise/Self-Monitoring): No   Have you ever had to go to the ED for symptoms of low blood sugar?: No       Do you have hyperglycemia symptoms?: No   Do you have hypoglycemia symptoms?: No   Blood Sugar Monitoring Regimen: Before Meals   Blood Sugar Trends: Fluctuating            Lab Results       None            Care Coordination Interventions    Referral from Primary Care Provider: No  Suggested Interventions and Community Resources  Disease Specific Clinic: Completed (Comment: diabetic clinic)  Meals on Wheels: Declined  Medi Set or Pill Pack: Declined (Comment: pt sets up pill box weekly)  Pharmacist: Completed (Comment: sees pharmacist through diabetic clinic)  Transportation Support: Declined  Zone Management Tools: Completed          Goals Addressed                   This Visit's Progress     Conditions and Symptoms   On track     I will schedule office visits, as directed by my provider. I will keep my appointment or reschedule if I have to cancel. I will notify my provider of any barriers to my plan of care. I will follow my Zone Management tool to seek urgent or emergent care. I will notify my provider of any symptoms that indicate a worsening of my condition. diabetes    Barriers: financial, overwhelmed by complexity of regimen, stress, and time constraints  Plan for overcoming my barriers: work with diabetic clinic, education on diabetes zones. Monitor BS's closely. Complete med assistance forms  Confidence: 9/10  Anticipated Goal Completion Date: 12/19/22                Prior to Admission medications    Medication Sig Start Date End Date Taking? Authorizing Provider   lacosamide (VIMPAT) 200 MG tablet Take 200 mg by mouth 2 times daily. Historical Provider, MD   gabapentin (NEURONTIN) 600 MG tablet Take 1 tablet by mouth 4 times daily for 90 days.  8/29/22 11/27/22  Farris Schilder, DO   insulin glargine [facility]) 100 UNIT/ML injection pen Inject 120 Units into the skin 2 times daily 8/29/22   Farris Schilder, DO   Dulaglutide 0.75 MG/0.5ML SOPN Inject 0.75 mg into the skin once a week 8/29/22   Farris Schilder, DO lisinopril (PRINIVIL;ZESTRIL) 5 MG tablet Take 1 tablet by mouth daily 8/29/22   Bartolo Koenig DO   metoprolol tartrate (LOPRESSOR) 25 MG tablet Take 1 tablet by mouth 2 times daily 8/29/22   Bartolo Koenig DO   ARIPiprazole (ABILIFY) 10 MG tablet Take 10 mg by mouth daily Dose reduced by psychiatry 8/30/22    Historical Provider, MD   rOPINIRole (REQUIP) 1 MG tablet Take 1 tablet by mouth 3 times daily 7/4/22   Deepti Palmer MD   promethazine (PHENERGAN) 12.5 MG tablet Take 1-2 tablets by mouth every 8 hours as needed for Nausea 6/20/22   Deepti Palmer MD   aspirin 81 MG chewable tablet Take 1 tablet by mouth daily 6/15/22   Cindy Martin TopperDO   Insulin Pen Needle 32G X 4 MM MISC Use 5 times daily with insulin pens 5/18/22   Deepti Palmer MD   vitamin D (ERGOCALCIFEROL) 1.25 MG (96184 UT) CAPS capsule Take 1 capsule by mouth once a week 4/26/22   Deepti Palmer MD   blood glucose test strips (ONETOUCH ULTRA) strip 1 each by In Vitro route 5 times daily DX: E11.65 Dispense Onetouch Ultra 2 test strips 2/22/22   Deepti Palmer MD   sucralfate (CARAFATE) 1 GM tablet Take 1 g by mouth 4 times daily    Historical Provider, MD   DULoxetine (CYMBALTA) 60 MG extended release capsule Take 120 mg by mouth daily    Historical Provider, MD   levETIRAcetam (KEPPRA) 1000 MG tablet Take 2 tablets by mouth 2 times daily  Patient taking differently: Take 1,000 mg by mouth 2 times daily 8/4/21   Lidya Recinos MD   insulin lispro (HUMALOG) 100 UNIT/ML injection vial Takes 40 units with meals plus personal sliding scale    Historical Provider, MD   pantoprazole (PROTONIX) 40 MG tablet Take 1 tablet by mouth 2 times daily 3/8/21   Deepti Palmer MD       Future Appointments   Date Time Provider Staci Jay   10/20/2022  8:30 AM Kelsey Crenshaw, PT STR[facility] PT [facility]   10/24/2022  9:30 AM Ahsan Smalls, PTA STRZ PT [place] [facility]   10/27/2022  9:15 AM Kelsey Crenshaw, PT STRZ PT [place] [facility]   10/31/2022  9:30 AM Hernán Zhou, PTA STRZ PT [place] [facility]   11/3/2022  9:15 AM Michael Ayoub, PT [facility]   11/22/2022  9:00 AM Luis Ratliff, [facility] SRPX Physic [facility] [place]   11/29/2022  8:30 AM NICANOR Nguyễn - CNP N SRPX Pain [facility] [place]   1/3/2023  1:40 PM Donovan Carter MD Neuro Spec [facility] [place]

## 2022-10-20 ENCOUNTER — HOSPITAL ENCOUNTER (OUTPATIENT)
Dept: PHYSICAL THERAPY | Age: 52
Setting detail: THERAPIES SERIES
Discharge: HOME | End: 2022-10-20
Payer: MEDICARE

## 2022-10-20 PROCEDURE — 97110 THERAPEUTIC EXERCISES: CPT

## 2022-10-20 NOTE — CARE COORDINATION
I was able to fax the provider the new applications for assistance into the PAP programs for 2023. Once I get these back I will be able to fax into the company.           [address] [place] [address]   Medication Assistance  [address] [place] [address], and [employer]    (S) [phone]  (I) [phone]

## 2022-10-20 NOTE — PROGRESS NOTES
[facility]  PHYSICAL THERAPY  [] EVALUATION  [x] DAILY NOTE (LAND) [] DAILY NOTE (AQUATIC ) [] PROGRESS NOTE [] DISCHARGE NOTE    [x] OUTPATIENT REHABILITATION [facility]   [] [place] [address]    [] [facility]   [] Brayan Bolaños    Date: 10/20/2022  Patient Name:  Nicolas Haynes  : 1970  MRN: [id number]  CSN: [id number]    Referring Practitioner DORYS De Santiago*   Diagnosis Spinal stenosis, lumbar region without neurogenic claudication [M48.061]  Spondylosis without myelopathy or radiculopathy, lumbar region [M47.816]  Spondylosis without myelopathy or radiculopathy, lumbar region [M47.816]    Treatment Diagnosis Chronic lumbar pain, decreased hip ROM, core and BLE weakness, impaired balance, difficulty walking   Date of Evaluation 10/6/22    Additional Pertinent History DVT, HTN, liver disease, IBS, GERD, DMT2, MISTI, depression, suicide attempt, bipolar, seizures, neuropathy, charcot foot with surgery on left foot since May, obesity, memory issues. Pt will be placed in permanent boot on 10/13/22. Pt uses bone stimulator 5 hours/day. Functional Outcome Measure Used Modified Oswestry   Functional Outcome Score 19/50= 38% (10/6/22)       Insurance: Primary: Payor: MEDICARE /  /  / ,   Secondary:    Authorization Information: PRECERTIFICATION REQUIRED:  No  INSURANCE THERAPY BENEFIT:  Patient has unlimited visits based on medical necessity  Benefit will not cover maintenance or preventative treatment. AQUATIC THERAPY COVERED:   Yes  MODALITIES COVERED:  Yes, with the exception of iontophoresis and hot packs/cold packs  TELEHEALTH COVERED: Yes   Visit # 4, 4/10 for progress note   Visits Allowed: unlimited   Recertification Date:    Physician Follow-Up: 22; neurosurgeon Dr. Lisa Abreu 10/19/22   Physician Orders:    History of Present Illness: Pt presents with chronic lumbar pain.  Years ago, pt had injections in back which seemed to help. Pain has progressively worsened since July and pt feels its due to wearing the boot. Pain aggravated with standing and walking >5 minutes, lifting and cleaning. Pain has started radiating into lower buttocks ~1 week ago. Pain alleviated with rest/sitting. Pt denies taking pain medication and unable to take Tylenol or ibuprofen d/t stomach issues. Pt uses heating pad without relief. SUBJECTIVE: Patient states he as been feeling about the same. Reports no change in pain following PT sessions and states that he uses a heating pad at home. Rates current pain 6/10. Reports compliance with HEP. He states that he does have a follow up with his foot doctor on Monday. Patient reports use of SPC outside of the home to avoid falls. Reports that he has a knee scooter and a wheelchair at home that he is not using.      TREATMENT   Precautions: NWB LLE but pt not compliant   Pain: 6/10 low back radiating into buttocks    \"X in shaded column indicates activity completed today    *\" next to exercise/intervention indicates progression   Modalities Parameters/  Location  Notes   IFC w/MHP 15 min, 5.2 V  x Skin intact pre/post treatment assessment, no signs of adverse reactions  Tolerated treatment today               Manual Therapy Time/Technique  Notes                     Exercise/Intervention   Notes   LTR 5x10 sec  x Cues to stay within comfortable range   SKTC with towel 5x10 sec  x    Piriformis stretch 5x10 sec  x           Abdominal bracing 10x5 sec  x painful   Posterior pelvic tilts 5x5 sec  x Reports increased pain   PPT marching* 2x5  x Increased pain   PPT alternating arms* 2x5  x           Seated       Swiss ball roll outs forward/lateral* 5x10 sec  x    Scapular retractions* 10x5 sec  x    R hamstring/calf stretch with strap* 5x10 sec  x    Hip adduction iso. with ball* 10x5 sec  x Cues for upright posture     Specific Interventions Next Treatment: IFC estim with MHP, manual as needed, hip stretching as tolerance, core and BLE strengthening, balance training when released by foot doctor to weight bear    Activity/Treatment Tolerance:  []  Patient tolerated treatment well  []  Patient limited by fatigue  [x]  Patient limited by pain   []  Patient limited by medical complications  []  Other:     Assessment: Patient started treatment with Estim IFC with MHP to lower lumbar area for pain. Progression limited secondary to high pain levels. Initiated seated therex secondary to pain with supine and patient reported reduced pain in seated position. Goals    Patient Goal: Alleviate pain    Short Term Goals: 4 weeks  Patient will tolerate standing and walking >10 minutes without increased pain for ease managing things around the house. Patient will demonstrate good core engagement and postural awareness during therapy session with <3 cues  to decrease spinal strain from cooking and cleaning. Patient will have <50% bilateral hip IR and ER AROM restriction to improve flexibility for ADLs. Long Term Goals: 12 weeks  Patient will demonstrate good core engagement and postural awareness during therapy session without cues to tolerate doing laundry. Patient will improve BLE strength to 4-/5 for stabilization when standing to do dishes and squatting to retrieve objects. Patient will hold bilateral SLR x8 seconds with good core engagement to improve stabilization when lifting. Patient will be independent and compliant with HEP to achieve above goals. Patient Education:   []  HEP/Education Completed: Patient given updated handouts for HEP. [employer] Access Code: [email or web address]  []  No new Education completed  [x]  Reviewed Prior HEP      [x]  Patient verbalized and/or demonstrated understanding of education provided. []  Patient unable to verbalize and/or demonstrate understanding of education provided. Will continue education.   []  Barriers to learning:     PLAN:  Treatment Recommendations: Strengthening, Range of Motion, Balance Training, Functional Mobility Training, Manual Therapy - Soft Tissue Mobilization, Pain Management, Home Exercise Program, Patient Education, Aquatics, and Modalities    []  Plan of care initiated. Plan to see patient 1-2 times per week for 8 weeks to address the treatment planned outlined above.   [x]  Continue with current plan of care  []  Modify plan of care as follows:    []  Hold pending physician visit  []  Discharge    Time In 0826   Time Out 0911   Timed Code Minutes: 45 min   Total Treatment Time: 45 min     Electronically Signed by: Stefani Breaux PT

## 2022-10-24 ENCOUNTER — APPOINTMENT (OUTPATIENT)
Dept: PHYSICAL THERAPY | Age: 52
End: 2022-10-24
Payer: MEDICARE

## 2022-10-27 ENCOUNTER — HOSPITAL ENCOUNTER (OUTPATIENT)
Dept: PHYSICAL THERAPY | Age: 52
Setting detail: THERAPIES SERIES
Discharge: HOME | End: 2022-10-27
Payer: MEDICARE

## 2022-10-27 PROCEDURE — 97110 THERAPEUTIC EXERCISES: CPT

## 2022-10-27 NOTE — PROGRESS NOTES
[address] [facility]  PHYSICAL THERAPY  [] EVALUATION  [x] DAILY NOTE (LAND) [] DAILY NOTE (AQUATIC ) [] PROGRESS NOTE [] DISCHARGE NOTE    [x] OUTPATIENT REHABILITATION CENTER [facility]   [] [place] [address]    [] [facility]   [] [place]    Date: 10/27/2022  Patient Name:  Frankie Boone  : 1970  MRN: [id number]  CSN: [id number]    Referring Practitioner VERITO Murray   Diagnosis Spinal stenosis, lumbar region without neurogenic claudication [M48.061]  Spondylosis without myelopathy or radiculopathy, lumbar region [M47.816]  Spondylosis without myelopathy or radiculopathy, lumbar region [M47.816]    Treatment Diagnosis Chronic lumbar pain, decreased hip ROM, core and BLE weakness, impaired balance, difficulty walking   Date of Evaluation 10/6/22    Additional Pertinent History DVT, HTN, liver disease, IBS, GERD, DMT2, MISTI, depression, suicide attempt, bipolar, seizures, neuropathy, charcot foot with surgery on left foot since May, obesity, memory issues. Pt will be placed in permanent boot on 10/13/22. Pt uses bone stimulator 5 hours/day. Functional Outcome Measure Used Modified Oswestry   Functional Outcome Score 50= 38% (10/6/22)       Insurance: Primary: Payor: MEDICARE /  /  / ,   Secondary:    Authorization Information: PRECERTIFICATION REQUIRED:  No  INSURANCE THERAPY BENEFIT:  Patient has unlimited visits based on medical necessity  Benefit will not cover maintenance or preventative treatment. AQUATIC THERAPY COVERED:   Yes  MODALITIES COVERED:  Yes, with the exception of iontophoresis and hot packs/cold packs  TELEHEALTH COVERED: Yes   Visit # 5, 5/10 for progress note   Visits Allowed: unlimited   Recertification Date:    Physician Follow-Up: 22; neurosurgeon Dr. Joshua Baires 10/19/22   Physician Orders:    History of Present Illness: Pt presents with chronic lumbar pain.  Years ago, pt had injections in back which seemed to help. Pain has progressively worsened since July and pt feels its due to wearing the boot. Pain aggravated with standing and walking >5 minutes, lifting and cleaning. Pain has started radiating into lower buttocks ~1 week ago. Pain alleviated with rest/sitting. Pt denies taking pain medication and unable to take Tylenol or ibuprofen d/t stomach issues. Pt uses heating pad without relief. SUBJECTIVE: Patient states he as been feeling about the same. Reports no change in pain following PT sessions and states that he uses a heating pad at home. Rates current pain 5/10. He saw his foot doctor on Monday and they told him it appears to be worsening and that his arch is flat. He was told to stay off of it as much as possible and if it keeps getting worse he will have to have surgery again. He reports that he is not staying off of his foot at home as he has small grandchildren at his house that he has to watch daily as they have custody of one of them and his wife works.  .    TREATMENT   Precautions: NWB LLE but pt not compliant   Pain: 6/10 low back radiating into buttocks    \"X in shaded column indicates activity completed today    *\" next to exercise/intervention indicates progression   Modalities Parameters/  Location  Notes   IFC w/MHP 15 min, 5.2 V  x Skin intact pre/post treatment assessment, no signs of adverse reactions  Tolerated treatment today               Manual Therapy Time/Technique  Notes                     Exercise/Intervention   Notes   LTR 5x10 sec  x Increased pain   SKTC with towel 5x10 sec  x    Piriformis stretch 5x10 sec             Abdominal bracing 10x5 sec   painful   Posterior pelvic tilts 5x5 sec   Reports increased pain   PPT marching* 2x5   Increased pain   PPT alternating arms* 2x5             Seated       Swiss ball roll outs forward/lateral* 5x10 sec  x    Scapular retractions* 2*x10 5 sec x    Posterior pelvic tilts* x10 5 sec x    PPT alternating arms* x10  x    R hamstring/calf stretch with strap* 5x10 sec      Hip adduction iso. with ball* 10x5 sec  x Cues for upright posture     Specific Interventions Next Treatment: IFC estim with MHP, manual as needed, hip stretching as tolerance, core and BLE strengthening, balance training when released by foot doctor to weight bear    Activity/Treatment Tolerance:  []  Patient tolerated treatment well  []  Patient limited by fatigue  [x]  Patient limited by pain   []  Patient limited by medical complications  []  Other:     Assessment: Patient started treatment with Estim IFC with MHP to lower lumbar area for pain. Had increased pain with supine therex and could only tolerate supine x ~5 minutes. Continued with seated exercises as noted above as tolerated today and progression limited due to increased pain levels. Goals    Patient Goal: Alleviate pain    Short Term Goals: 4 weeks  Patient will tolerate standing and walking >10 minutes without increased pain for ease managing things around the house. Patient will demonstrate good core engagement and postural awareness during therapy session with <3 cues  to decrease spinal strain from cooking and cleaning. Patient will have <50% bilateral hip IR and ER AROM restriction to improve flexibility for ADLs. Long Term Goals: 12 weeks  Patient will demonstrate good core engagement and postural awareness during therapy session without cues to tolerate doing laundry. Patient will improve BLE strength to 4-/5 for stabilization when standing to do dishes and squatting to retrieve objects. Patient will hold bilateral SLR x8 seconds with good core engagement to improve stabilization when lifting. Patient will be independent and compliant with HEP to achieve above goals. Patient Education:   []  HEP/Education Completed: Patient given updated handouts for HEP.   [employer] Access Code: [email or web address]  []  No new Education completed  [x]  Reviewed Prior HEP      [x]  Patient verbalized and/or demonstrated understanding of education provided. []  Patient unable to verbalize and/or demonstrate understanding of education provided. Will continue education. []  Barriers to learning:     PLAN:  Treatment Recommendations: Strengthening, Range of Motion, Balance Training, Functional Mobility Training, Manual Therapy - Soft Tissue Mobilization, Pain Management, Home Exercise Program, Patient Education, Aquatics, and Modalities    []  Plan of care initiated. Plan to see patient 1-2 times per week for 8 weeks to address the treatment planned outlined above.   [x]  Continue with current plan of care  []  Modify plan of care as follows:    []  Hold pending physician visit  []  Discharge    Time In 0920   Time Out 1000   Timed Code Minutes: 40 min   Total Treatment Time: 40 min     Electronically Signed by: Leatha Ordaz PT

## 2022-11-01 ENCOUNTER — ANESTHESIA EVENT (OUTPATIENT)
Dept: ENDOSCOPY | Age: 52
End: 2022-11-01
Payer: MEDICARE

## 2022-11-01 ENCOUNTER — HOSPITAL ENCOUNTER (OUTPATIENT)
Age: 52
Setting detail: OUTPATIENT SURGERY
Discharge: HOME | End: 2022-11-01
Attending: INTERNAL MEDICINE | Admitting: INTERNAL MEDICINE
Payer: MEDICARE

## 2022-11-01 ENCOUNTER — ANESTHESIA (OUTPATIENT)
Dept: ENDOSCOPY | Age: 52
End: 2022-11-01
Payer: MEDICARE

## 2022-11-01 VITALS
RESPIRATION RATE: 18 BRPM | HEART RATE: 69 BPM | HEIGHT: 74 IN | OXYGEN SATURATION: 94 % | DIASTOLIC BLOOD PRESSURE: 66 MMHG | TEMPERATURE: 96.9 F | BODY MASS INDEX: 36.57 KG/M2 | SYSTOLIC BLOOD PRESSURE: 134 MMHG | WEIGHT: 285 LBS

## 2022-11-01 PROCEDURE — 3700000001 HC ADD 15 MINUTES (ANESTHESIA): Performed by: INTERNAL MEDICINE

## 2022-11-01 PROCEDURE — 7100000011 HC PHASE II RECOVERY - ADDTL 15 MIN: Performed by: INTERNAL MEDICINE

## 2022-11-01 PROCEDURE — 7100000010 HC PHASE II RECOVERY - FIRST 15 MIN: Performed by: INTERNAL MEDICINE

## 2022-11-01 PROCEDURE — 6360000002 HC RX W HCPCS: Performed by: NURSE ANESTHETIST, CERTIFIED REGISTERED

## 2022-11-01 PROCEDURE — 3609010600 HC COLONOSCOPY POLYPECTOMY SNARE/COLD BIOPSY: Performed by: INTERNAL MEDICINE

## 2022-11-01 PROCEDURE — 3700000000 HC ANESTHESIA ATTENDED CARE: Performed by: INTERNAL MEDICINE

## 2022-11-01 PROCEDURE — 2580000003 HC RX 258: Performed by: INTERNAL MEDICINE

## 2022-11-01 PROCEDURE — 88305 TISSUE EXAM BY PATHOLOGIST: CPT

## 2022-11-01 PROCEDURE — 2709999900 HC NON-CHARGEABLE SUPPLY: Performed by: INTERNAL MEDICINE

## 2022-11-01 PROCEDURE — 2500000003 HC RX 250 WO HCPCS: Performed by: NURSE ANESTHETIST, CERTIFIED REGISTERED

## 2022-11-01 RX ORDER — SODIUM CHLORIDE 9 MG/ML
25 INJECTION, SOLUTION INTRAVENOUS PRN
Status: DISCONTINUED | OUTPATIENT
Start: 2022-11-01 | End: 2022-11-01 | Stop reason: HOSPADM

## 2022-11-01 RX ORDER — SODIUM CHLORIDE 0.9 % (FLUSH) 0.9 %
5-40 SYRINGE (ML) INJECTION PRN
Status: DISCONTINUED | OUTPATIENT
Start: 2022-11-01 | End: 2022-11-01 | Stop reason: HOSPADM

## 2022-11-01 RX ORDER — SODIUM CHLORIDE 450 MG/100ML
INJECTION, SOLUTION INTRAVENOUS CONTINUOUS
Status: DISCONTINUED | OUTPATIENT
Start: 2022-11-01 | End: 2022-11-01 | Stop reason: HOSPADM

## 2022-11-01 RX ORDER — SODIUM CHLORIDE 0.9 % (FLUSH) 0.9 %
5-40 SYRINGE (ML) INJECTION EVERY 12 HOURS SCHEDULED
Status: DISCONTINUED | OUTPATIENT
Start: 2022-11-01 | End: 2022-11-01 | Stop reason: HOSPADM

## 2022-11-01 RX ORDER — LIDOCAINE HYDROCHLORIDE 20 MG/ML
INJECTION, SOLUTION EPIDURAL; INFILTRATION; INTRACAUDAL; PERINEURAL PRN
Status: DISCONTINUED | OUTPATIENT
Start: 2022-11-01 | End: 2022-11-01 | Stop reason: SDUPTHER

## 2022-11-01 RX ORDER — PROPOFOL 10 MG/ML
INJECTION, EMULSION INTRAVENOUS PRN
Status: DISCONTINUED | OUTPATIENT
Start: 2022-11-01 | End: 2022-11-01 | Stop reason: SDUPTHER

## 2022-11-01 RX ADMIN — LIDOCAINE HYDROCHLORIDE 80 MG: 20 INJECTION, SOLUTION EPIDURAL; INFILTRATION; INTRACAUDAL; PERINEURAL at 13:34

## 2022-11-01 RX ADMIN — PROPOFOL 50 MG: 10 INJECTION, EMULSION INTRAVENOUS at 13:38

## 2022-11-01 RX ADMIN — SODIUM CHLORIDE: 4.5 INJECTION, SOLUTION INTRAVENOUS at 13:08

## 2022-11-01 RX ADMIN — PROPOFOL 30 MG: 10 INJECTION, EMULSION INTRAVENOUS at 13:35

## 2022-11-01 RX ADMIN — PROPOFOL 50 MG: 10 INJECTION, EMULSION INTRAVENOUS at 13:37

## 2022-11-01 RX ADMIN — PROPOFOL 20 MG: 10 INJECTION, EMULSION INTRAVENOUS at 13:40

## 2022-11-01 RX ADMIN — PROPOFOL 50 MG: 10 INJECTION, EMULSION INTRAVENOUS at 13:36

## 2022-11-01 RX ADMIN — PROPOFOL 30 MG: 10 INJECTION, EMULSION INTRAVENOUS at 13:43

## 2022-11-01 RX ADMIN — SODIUM CHLORIDE: 4.5 INJECTION, SOLUTION INTRAVENOUS at 13:09

## 2022-11-01 RX ADMIN — SODIUM CHLORIDE: 4.5 INJECTION, SOLUTION INTRAVENOUS at 13:29

## 2022-11-01 ASSESSMENT — PAIN DESCRIPTION - ORIENTATION
ORIENTATION: RIGHT;LEFT
ORIENTATION: RIGHT;LEFT

## 2022-11-01 ASSESSMENT — PAIN DESCRIPTION - FREQUENCY
FREQUENCY: CONTINUOUS
FREQUENCY: CONTINUOUS

## 2022-11-01 ASSESSMENT — PAIN SCALES - GENERAL
PAINLEVEL_OUTOF10: 7
PAINLEVEL_OUTOF10: 7

## 2022-11-01 ASSESSMENT — PAIN DESCRIPTION - DESCRIPTORS
DESCRIPTORS: BURNING
DESCRIPTORS: BURNING

## 2022-11-01 ASSESSMENT — PAIN DESCRIPTION - ONSET
ONSET: ON-GOING
ONSET: ON-GOING

## 2022-11-01 ASSESSMENT — PAIN DESCRIPTION - PAIN TYPE
TYPE: CHRONIC PAIN
TYPE: CHRONIC PAIN

## 2022-11-01 ASSESSMENT — PAIN DESCRIPTION - LOCATION
LOCATION: FOOT
LOCATION: FOOT

## 2022-11-01 ASSESSMENT — PAIN - FUNCTIONAL ASSESSMENT
PAIN_FUNCTIONAL_ASSESSMENT: PREVENTS OR INTERFERES SOME ACTIVE ACTIVITIES AND ADLS
PAIN_FUNCTIONAL_ASSESSMENT: PREVENTS OR INTERFERES SOME ACTIVE ACTIVITIES AND ADLS
PAIN_FUNCTIONAL_ASSESSMENT: 0-10

## 2022-11-01 ASSESSMENT — ENCOUNTER SYMPTOMS: SHORTNESS OF BREATH: 1

## 2022-11-01 NOTE — PROGRESS NOTES
Patient in phase II. Alert and oriented. Vitals stable. Wife at bedside. Dr. Columba Sykes in to discuss findings with patient and wife and they verbalized understanding. Patient able to take fluids. Patient enocuraged to pass gas. No additional concerns voiced.

## 2022-11-01 NOTE — ANESTHESIA PRE PROCEDURE
Department of Anesthesiology  Preprocedure Note       Name:  Gerber Penaloza   Age:  46 y.o.  :  1970                                          MRN:  [id number]         Date:  2022      Surgeon: Kelsey Herrera):  Dakotah Callahan MD    Procedure: Procedure(s):  COLONOSCOPY    Medications prior to admission:   Prior to Admission medications    Medication Sig Start Date End Date Taking? Authorizing Provider   lacosamide (VIMPAT) 200 MG tablet Take 200 mg by mouth 2 times daily. Historical Provider, MD   gabapentin (NEURONTIN) 600 MG tablet Take 1 tablet by mouth 4 times daily for 90 days.  22  Farris Schilder, DO   insulin glargine [facility]) 100 UNIT/ML injection pen Inject 120 Units into the skin 2 times daily 22   Farris Schilder, DO   Dulaglutide 0.75 MG/0.5ML SOPN Inject 0.75 mg into the skin once a week 22   Farris Schilder, DO   lisinopril (PRINIVIL;ZESTRIL) 5 MG tablet Take 1 tablet by mouth daily 22   Farris Schilder, DO   metoprolol tartrate (LOPRESSOR) 25 MG tablet Take 1 tablet by mouth 2 times daily 22   Farris Schilder, DO   ARIPiprazole (ABILIFY) 10 MG tablet Take 10 mg by mouth daily Dose reduced by psychiatry 22    Historical Provider, MD   rOPINIRole (REQUIP) 1 MG tablet Take 1 tablet by mouth 3 times daily 22   Melita Uribe MD   promethazine (PHENERGAN) 12.5 MG tablet Take 1-2 tablets by mouth every 8 hours as needed for Nausea 22   Melita Uribe MD   aspirin 81 MG chewable tablet Take 1 tablet by mouth daily 6/15/22   Grace Parents Topper, DO   Insulin Pen Needle 32G X 4 MM MISC Use 5 times daily with insulin pens 22   Melita Uribe MD   vitamin D (ERGOCALCIFEROL) 1.25 MG (76930 UT) CAPS capsule Take 1 capsule by mouth once a week 22   Melita Uribe MD   blood glucose test strips (ONETOUCH ULTRA) strip 1 each by In Vitro route 5 times daily DX: E11.65 Dispense Onetouch Ultra 2 test strips 22   Melita Uribe MD   sucralfate (CARAFATE) 1 GM tablet Take 1 g by mouth 4 times daily    Historical Provider, MD   DULoxetine (CYMBALTA) 60 MG extended release capsule Take 120 mg by mouth daily    Historical Provider, MD   levETIRAcetam (KEPPRA) 1000 MG tablet Take 2 tablets by mouth 2 times daily  Patient taking differently: Take 1,000 mg by mouth 2 times daily 8/4/21   Rut Watts MD   insulin lispro (HUMALOG) 100 UNIT/ML injection vial Takes 40 units with meals plus personal sliding scale    Historical Provider, MD   pantoprazole (PROTONIX) 40 MG tablet Take 1 tablet by mouth 2 times daily 3/8/21   Eva Alvarado MD       Current medications:    Current Facility-Administered Medications   Medication Dose Route Frequency Provider Last Rate Last Admin    sodium chloride flush 0.9 % injection 5-40 mL  5-40 mL IntraVENous 2 times per day Jesica Heard MD        sodium chloride flush 0.9 % injection 5-40 mL  5-40 mL IntraVENous PRN Jesica Heard MD        0.9 % sodium chloride infusion  25 mL IntraVENous PRN Jesica Heard MD        0.45 % sodium chloride infusion   IntraVENous Continuous Jesica Heard  mL/hr at 11/01/22 1308 New Bag at 11/01/22 1308       Allergies: Allergies   Allergen Reactions    Ciprofloxacin-Ciproflox Hcl Er Other (See Comments)     Elevated creatinine    Heparin      Monitor for thrombocytopenia    Metformin Nausea Only     Abdominal pain, diarrhea    Niacin And Related Other (See Comments)     Burning sensation, severe flushing    Tramadol Hcl      Contraindication to seizure medications    Ultram [Tramadol]      Contraindication to seizure medications    Warfarin      Very difficult to regulate in past    Other      Other reaction(s): very difficult to regulate    Tylenol [Acetaminophen] Other (See Comments)     Interferes with CGM        Problem List:    Patient Active Problem List   Diagnosis Code    Depression F32. A    Hyperlipidemia E78.5    Liver disease K76.9    Restless legs post uvulopalatopharyngoplasty Z98.890    Status post partial thyroidectomy E89.0    Episodic confusion R41.0    MRSA infection within last 3 months Z86.14    Nonhealing nonsurgical wound T14. 8XXA    Breakthrough seizure ([address] G40.919    Partial seizures ([address] R56.9    Tremors of nervous system R25.1    Inflammatory bowel disease K52.9    Chronic skin ulcer, limited to breakdown of skin ([address] L98.491    Tarsal tunnel syndrome G57.50    Ulcer of foot due to type 2 diabetes mellitus ([address] E11.621, L97.509    Recurrent acute deep vein thrombosis (DVT) of lower extremity ([facility]) I82.409    Charcot's joint of left foot M14.672    Tarsal tunnel syndrome of both lower extremities G57.53    Ulcer of right foot with necrosis of bone ([facility]) L97.514    Chest pain R07.9    Unstable angina ([facility]) I20.0    Contracture, left ankle M24.572    Congenital pes planus Q66.50       Past Medical History:        Diagnosis Date    Abnormal thyroid biopsy     s/p left hemithyroidectomy 4/1875 -benign follicular adenoma    Acid reflux     Anemia     resolved - previously seen by Dr. Sj Gibbons (due to enlarged spleen)    Anesthesia     seizures with brain surgery due to blood sugar got really high    Bile reflux gastritis     per EGD 11/16/21 - Dr. Opal Sommer - to start Carafate.     Bipolar disorder ([address]     Blood clot in vein 04/07/2016    Swapna Gilman     Chest pain     previously seeing [address] [place] [address] cardiologist, now seeing Dr. Alvaro Vega (LAD bridging on cath 4/2016)    Chronic back pain     Chronic bronchitis ([address]     Dr. Zenobia Smith Colon polyp 08/30/2016    Depression     seeing Lisa Marcum DVT (deep venous thrombosis) ([address] 3946-4302    not on Coumadin due to unable to regulate - Dr. Sienna Santiago - no etiology found per patient    Esophageal abnormality     nodule     Fatty liver disease, nonalcoholic     U/S 83/1333 [facility]    Follicular adenoma of thyroid gland 01/15/2021    s/p lobectomy    Furuncle     legs    History of blood transfusion     History of Doppler ultrasound 05/29/2011    No hemodynamically significant carotid stenosis is identified. A thyroid nodule on each side. Dedicated ultrasound of thyroid gland is suggested to further evaluate if clinically indicated.  History of pulmonary embolism 2007    s/p GFF, related to knee surgery    Hx of blood clots     leg and lung    Hyperlipidemia     severely elevated triglycerides    Hypertension     diastolic    Intracranial arachnoid cyst 11/2012    Dr. Arin Browne drained, complicated with seizures, DKA    Irritable bowel syndrome     Liver disease     Ramonia Gil - elevated LFT - positive smooth muscle antibody, steatosis per liver bx 3/2014 with Dr. Cindy Shahid (multiple drug resistant organisms) resistance 2012    MRSA (methicillin resistant staph aureus) culture positive     h/o in foot and before brain surgery    Nephrolithiasis     noted on CT abdomen 9/2016, 6/2019    Neuropathy     Pancreatic insufficiency     Positive SANDY (antinuclear antibody)     Dr. Crystla Sosa - first visit in 6/2013    Prolonged emergence from general anesthesia     Restless legs syndrome     Seizures ([address]     started with brain surgery    Sinus tachycardia     Holter 3/2013 - seeing Dr. Val Bedoya fracture [facility])     clips     Sleep apnea     positive sleep apnea per study 10/2020.     Systolic dysfunction     \"systolic bridge\"  EF 83% ECHO 9/2019    Type II or unspecified type diabetes mellitus without mention of complication, not stated as uncontrolled 2007       Past Surgical History:        Procedure Laterality Date    CARDIAC CATHETERIZATION      2011,5-6 years ago   [address]  03/2012    CARDIAC CATHETERIZATION  04/28/2016    [facility]     CARPAL TUNNEL RELEASE  01/2017    CHOLECYSTECTOMY  2004    COLONOSCOPY  2012    COLONOSCOPY  08/2016    2 tubular adenomas - reportedly needs repeat scope in 6 months    CRANIOTOMY  11/2012    arachnoid cyst drainage    EKG 12-LEAD  10/18/2015         ENDOSCOPY, COLON, DIAGNOSTIC      FOOT SURGERY  05/20/2022    right foot metatarsal phalangeal joint resection and placement of antibiotic beads, left foot Achilles tendon lengthening    HERNIA REPAIR Right 1996    [facility]--Dr. Myra Pérez    INGUINAL HERNIA REPAIR Right 09/15/2016    Robotic assisted    KNEE ARTHROSCOPY Right 2016    KNEE SURGERY Left 2007    acl and debrided twice    OTHER SURGICAL HISTORY  05/31/2011    Tilt table was associated w/ nonspecific symptoms or nausea, otherwise no significant dizziness or syncope. No significant hemodynamic changes. Otherwise, unremarkable tilt table test after 30 minutes of tilting.  OTHER SURGICAL HISTORY  01/20/2017    RIGHT SHOULDER ARTHROSCOPY, OPEN STAN, OPEN ACROMIOPLASTY, BICEP TENDESIS, RIGHT CARPAL TUNNEL RELEASE    SHOULDER SURGERY Right 01/2007    THYROID LOBECTOMY N/A 01/15/2021    THYROID LOBECTOMY, UVULOPALATOPHARYNGOPLAST LAOP performed by Ai Vance MD at [address] [place] [address] ECHOCARDIOGRAM  03/05/2011    LV size and systolic function normal. EF 55-65%.      UPPER GASTROINTESTINAL ENDOSCOPY  2012    UPPER GASTROINTESTINAL ENDOSCOPY  2016    Dr. Shahla Mccallum Left 10/22/2019    EGD DILATION SAVORY performed by Magdalena Headley MD at [address] [place] [address] ENDOSCOPY Left 10/22/2019    EGD BIOPSY performed by Magdalena Headley MD at [address] [place] [address] ENDOSCOPY N/A 11/16/2021    EGD performed by Magdalena Headley MD at [address] [place] [address] ENDOSCOPY Left 11/16/2021    EGD BIOPSY performed by Magdalena Headley MD at [facility]   [facility]  2007       Social History:    Social History     Tobacco Use    Smoking status: Never    Smokeless tobacco: Never   Substance Use Topics    Alcohol use: No     Alcohol/week: 0.0 standard drinks 12:16 AM    APTT 82.0 06/14/2022 12:16 AM       HCG (If Applicable): No results found for: PREGTESTUR, PREGSERUM, HCG, HCGQUANT     ABGs: No results found for: PHART, PO2ART, YIB8MHF, LGF7TZD, BEART, Z1QCLNKH     Type & Screen (If Applicable):  Lab Results   Component Value Date    LABRH NEG 04/28/2016       Drug/Infectious Status (If Applicable):  Lab Results   Component Value Date/Time    HEPCAB Negative 01/20/2021 05:03 PM       COVID-19 Screening (If Applicable):   Lab Results   Component Value Date/Time    COVID19 DETECTED 08/22/2022 04:22 PM           Anesthesia Evaluation  Patient summary reviewed and Nursing notes reviewed history of anesthetic complications:   Airway: Mallampati: III          Dental:    (+) poor dentition      Pulmonary: breath sounds clear to auscultation  (+) shortness of breath:  sleep apnea:                             Cardiovascular:  Exercise tolerance: good (>4 METS),   (+) hypertension:, angina:, EWING:,         Rhythm: regular  Rate: normal                    Neuro/Psych:   (+) neuromuscular disease:, headaches:, psychiatric history:            GI/Hepatic/Renal:   (+) GERD:, liver disease:, bowel prep, morbid obesity          Endo/Other:    (+) DiabetesType II DM, , hypothyroidism::., .                 Abdominal:   (+) obese,     Abdomen: soft. Vascular: Other Findings:           Anesthesia Plan      MAC     ASA 3       Induction: intravenous. Anesthetic plan and risks discussed with patient and spouse. Use of blood products discussed with patient and spouse whom. Plan discussed with CRNA and attending.                     Ala Councilman, APRN - CRNA   11/1/2022

## 2022-11-01 NOTE — ANESTHESIA POSTPROCEDURE EVALUATION
Department of Anesthesiology  Postprocedure Note    Patient: Shelton Neri  MRN: [id number]  YOB: 1970  Date of evaluation: 11/1/2022      Procedure Summary     Date: 11/01/22 Room / Location: [address] [place] [address]    Anesthesia Start: 6205 Anesthesia Stop: 8383    Procedure: COLONOSCOPY POLYPECTOMY SNARE/COLD BIOPSY Diagnosis:       History of colon polyps      (History of colon polyps [Z86.010])    Surgeons: Mable Poe MD Responsible Provider: Sangita Villatoro MD    Anesthesia Type: MAC ASA Status: 3          Anesthesia Type: No value filed.     Levi Phase I: Levi Score: 10    Levi Phase II:        Anesthesia Post Evaluation    Patient location during evaluation: PACU  Patient participation: complete - patient participated  Level of consciousness: awake and alert  Pain score: 0  Airway patency: patent  Nausea & Vomiting: no nausea and no vomiting  Complications: no  Cardiovascular status: blood pressure returned to baseline  Respiratory status: acceptable  Hydration status: euvolemic

## 2022-11-01 NOTE — H&P
[facility]  Sedation/Analgesia History & Physical    Patient: Prasad Madrid : 1970  [facility] Rec#: [id number] Acc#: [id number]   Provider Performing Procedure: Angel Blair MD  Primary Care Physician: Ryan Branham MD    PRE-PROCEDURE   Full CODE [x]Yes  DNR-CCA/DNR-CC []Yes   Brief History/Pre-Procedure Diagnosis:polyps          MEDICAL HISTORY  []CAD/Valve  []Liver Disease  []Lung Disease []Diabetes  []Hypertension []Renal Disease  []Additional information:       has a past medical history of Abnormal thyroid biopsy, Acid reflux, Anemia, Anesthesia, Bile reflux gastritis, Bipolar disorder ([address] Blood clot in vein, Chest pain, Chronic back pain, Chronic bronchitis ([address] Colon polyp, Depression, DVT (deep venous thrombosis) ([address] Esophageal abnormality, Fatty liver disease, nonalcoholic, Follicular adenoma of thyroid gland, Furuncle, History of blood transfusion, History of Doppler ultrasound, History of pulmonary embolism, Hx of blood clots, Hyperlipidemia, Hypertension, Intracranial arachnoid cyst, Irritable bowel syndrome, Liver disease, MDRO (multiple drug resistant organisms) resistance, MRSA (methicillin resistant staph aureus) culture positive, Nephrolithiasis, Neuropathy, Pancreatic insufficiency, Positive SANDY (antinuclear antibody), Prolonged emergence from general anesthesia, Restless legs syndrome, Seizures ([address] Sinus tachycardia, Skull fracture ([address] Sleep apnea, Systolic dysfunction, and Type II or unspecified type diabetes mellitus without mention of complication, not stated as uncontrolled. SURGICAL HISTORY   has a past surgical history that includes knee surgery (Left, ); Vena Cava Filter Placement (); hernia repair (Right, ); Cholecystectomy (); Upper gastrointestinal endoscopy (); transthoracic echocardiogram (2011); other surgical history (2011);  Cardiac catheterization; Cardiac catheterization (2012); craniotomy (2012); Tonsillectomy; Endoscopy, colon, diagnostic; EKG 12 Lead (10/18/2015); Knee arthroscopy (Right, 2016); Cardiac catheterization (04/28/2016); Upper gastrointestinal endoscopy (2016); Inguinal hernia repair (Right, 09/15/2016); Colonoscopy (2012); Colonoscopy (08/2016); other surgical history (01/20/2017); shoulder surgery (Right, 01/2007); Carpal tunnel release (01/2017); Upper gastrointestinal endoscopy (Left, 10/22/2019); Upper gastrointestinal endoscopy (Left, 10/22/2019); Thyroid lobectomy (N/A, 01/15/2021); Upper gastrointestinal endoscopy (N/A, 11/16/2021); Upper gastrointestinal endoscopy (Left, 11/16/2021); and Foot surgery (05/20/2022). Additional information:       ALLERGIES   Allergies as of 09/06/2022 - Fully Reviewed 08/29/2022   Allergen Reaction Noted    Ciprofloxacin-ciproflox hcl er Other (See Comments) 03/23/2012    Heparin  05/13/2014    Metformin Nausea Only 07/18/2014    Niacin and related Other (See Comments) 07/18/2014    Tramadol hcl  10/27/2016    Ultram [tramadol]  01/28/2016    Warfarin  05/13/2014    Other  03/04/2022    Tylenol [acetaminophen] Other (See Comments) 08/29/2022     Additional information:       MEDICATIONS   Coumadin Use Last 7 Days [x]No []Yes  Antiplatelet drug therapy use last 7 days  [x]No []Yes  Other anticoagulant use last 7 days  [x]No []Yes    Current Facility-Administered Medications:     sodium chloride flush 0.9 % injection 5-40 mL, 5-40 mL, IntraVENous, 2 times per day, Anne Rice MD    sodium chloride flush 0.9 % injection 5-40 mL, 5-40 mL, IntraVENous, PRN, Anne Rice MD    0.9 % sodium chloride infusion, 25 mL, IntraVENous, PRN, Anne Rice MD  Prior to Admission medications    Medication Sig Start Date End Date Taking? Authorizing Provider   lacosamide (VIMPAT) 200 MG tablet Take 200 mg by mouth 2 times daily. Historical Provider, MD   gabapentin (NEURONTIN) 600 MG tablet Take 1 tablet by mouth 4 times daily for 90 days.  8/29/22 11/27/22 Jennifer Vicente DO   insulin glargine [facility]) 100 UNIT/ML injection pen Inject 120 Units into the skin 2 times daily 8/29/22   Jennifer Vicente DO   Dulaglutide 0.75 MG/0.5ML SOPN Inject 0.75 mg into the skin once a week 8/29/22   Jennifer Vicente DO   lisinopril (PRINIVIL;ZESTRIL) 5 MG tablet Take 1 tablet by mouth daily 8/29/22   Jennifer Vicente DO   metoprolol tartrate (LOPRESSOR) 25 MG tablet Take 1 tablet by mouth 2 times daily 8/29/22   Jennifer Vicente DO   ARIPiprazole (ABILIFY) 10 MG tablet Take 10 mg by mouth daily Dose reduced by psychiatry 8/30/22    Historical Provider, MD   rOPINIRole (REQUIP) 1 MG tablet Take 1 tablet by mouth 3 times daily 7/4/22   Cass Ocasio MD   promethazine (PHENERGAN) 12.5 MG tablet Take 1-2 tablets by mouth every 8 hours as needed for Nausea 6/20/22   Cass Ocasio MD   aspirin 81 MG chewable tablet Take 1 tablet by mouth daily 6/15/22   Nikhil Danielsy Topper,    Insulin Pen Needle 32G X 4 MM MISC Use 5 times daily with insulin pens 5/18/22   Cass Ocasio MD   vitamin D (ERGOCALCIFEROL) 1.25 MG (69126 UT) CAPS capsule Take 1 capsule by mouth once a week 4/26/22   Cass Ocasio MD   blood glucose test strips (ONETOUCH ULTRA) strip 1 each by In Vitro route 5 times daily DX: E11.65 Dispense Onetouch Ultra 2 test strips 2/22/22   Cass Ocasio MD   sucralfate (CARAFATE) 1 GM tablet Take 1 g by mouth 4 times daily    Historical Provider, MD   DULoxetine (CYMBALTA) 60 MG extended release capsule Take 120 mg by mouth daily    Historical Provider, MD   levETIRAcetam (KEPPRA) 1000 MG tablet Take 2 tablets by mouth 2 times daily  Patient taking differently: Take 1,000 mg by mouth 2 times daily 8/4/21   Florence Farah MD   insulin lispro (HUMALOG) 100 UNIT/ML injection vial Takes 40 units with meals plus personal sliding scale    Historical Provider, MD   pantoprazole (PROTONIX) 40 MG tablet Take 1 tablet by mouth 2 times daily 3/8/21   Cass Ocasio MD     Additional information:       PHYSICAL:   Heart:  [x]Regular rate and rhythm  []Other:    Lungs:  [x]Clear    []Other:    Abdomen: [x]Soft    []Other:    Mental Status: [x]Alert & Oriented  []Other:      VITAL SIGNS   Patient Vitals for the past 24 hrs:   BP Temp Temp src Pulse Resp SpO2 Height Weight   11/01/22 1233 (!) 174/92 97.7 °F (36.5 °C) Temporal 77 18 94 % 6' 2\" (1.88 m) 285 lb (129.3 kg)       PLANNED PROCEDURE  []EGD  []Colonoscopy []Flex Sigmoid  []ERCP []EUS   []Cystoscopy  [] CATH [] BRONCH   Consent: I have discussed with the patient and/or the patient representative the indication, alternatives, and the possible risks and/or complications of the planned procedure and the anesthesia methods. The patient and/or patient representative appear to understand and agree to proceed. SEDATION  Planned agent:[]Midazolam []Meperidine []Sublimaze []Morphine  []Diazepam [x]Propofol  []Other:     ASA Classification: Class 3 - A patient with severe systemic disease that limits activity but is not incapacitating    Airway Assessment: normal    Monitoring and Safety: The patient will be placed on a cardiac monitor and vital signs, pulse oximetry and level of consciousness will be continuously evaluated throughout the procedure. The patient will be closely monitored until recovery from the medications is complete and the patient has returned to baseline status. Respiratory therapy will be on standby during the procedure. [x]Pre-procedure diagnostic studies complete and results available. Comment:    [x]Previous sedation/anesthesia experiences assessed. Comment:    [x]The patient is an appropriate candidate to undergo the planned procedure sedation and anesthesia. (Refer to nursing sedation/analgesia documentation record)  [x]Formulation and discussion of sedation/procedure plan, risks, and expectations with patient and/or responsible adult completed. [x]Patient examined immediately prior to the procedure.  (Refer to nursing sedation/analgesia documentation record)    Phoebe Marcum MD, MD   Electronically signed 11/1/2022 at 12:42 PM

## 2022-11-01 NOTE — ANESTHESIA PRE PROCEDURE
Department of Anesthesiology  Preprocedure Note       Name:  Elle Moses   Age:  46 y.o.  :  1970                                          MRN:  [id number]         Date:  2022      Surgeon: Justyna Ramos):  Magdalena Headley MD    Procedure: Procedure(s):  COLONOSCOPY    Medications prior to admission:   Prior to Admission medications    Medication Sig Start Date End Date Taking? Authorizing Provider   lacosamide (VIMPAT) 200 MG tablet Take 200 mg by mouth 2 times daily. Historical Provider, MD   gabapentin (NEURONTIN) 600 MG tablet Take 1 tablet by mouth 4 times daily for 90 days.  22  Jennifer Vicente DO   insulin glargine [facility]) 100 UNIT/ML injection pen Inject 120 Units into the skin 2 times daily 22   Jennifer Vicente DO   Dulaglutide 0.75 MG/0.5ML SOPN Inject 0.75 mg into the skin once a week 22   Jennifer Vicente DO   lisinopril (PRINIVIL;ZESTRIL) 5 MG tablet Take 1 tablet by mouth daily 22   Jennifer Vicente DO   metoprolol tartrate (LOPRESSOR) 25 MG tablet Take 1 tablet by mouth 2 times daily 22   Jennifer Vicente DO   ARIPiprazole (ABILIFY) 10 MG tablet Take 10 mg by mouth daily Dose reduced by psychiatry 22    Historical Provider, MD   rOPINIRole (REQUIP) 1 MG tablet Take 1 tablet by mouth 3 times daily 22   Cass Ocasio MD   promethazine (PHENERGAN) 12.5 MG tablet Take 1-2 tablets by mouth every 8 hours as needed for Nausea 22   Cass Ocasio MD   aspirin 81 MG chewable tablet Take 1 tablet by mouth daily 6/15/22   Nikhil Danielsy TopperDO   Insulin Pen Needle 32G X 4 MM MISC Use 5 times daily with insulin pens 22   Cass Ocasio MD   vitamin D (ERGOCALCIFEROL) 1.25 MG (43737 UT) CAPS capsule Take 1 capsule by mouth once a week 22   Cass Ocasio MD   blood glucose test strips (ONETOUCH ULTRA) strip 1 each by In Vitro route 5 times daily DX: E11.65 Dispense Onetouch Ultra 2 test strips 22   Cass Ocasio MD   sucralfate (CARAFATE) 1 GM tablet Take 1 g by mouth 4 times daily    Historical Provider, MD   DULoxetine (CYMBALTA) 60 MG extended release capsule Take 120 mg by mouth daily    Historical Provider, MD   levETIRAcetam (KEPPRA) 1000 MG tablet Take 2 tablets by mouth 2 times daily  Patient taking differently: Take 1,000 mg by mouth 2 times daily 8/4/21   Naveen Huntley MD   insulin lispro (HUMALOG) 100 UNIT/ML injection vial Takes 40 units with meals plus personal sliding scale    Historical Provider, MD   pantoprazole (PROTONIX) 40 MG tablet Take 1 tablet by mouth 2 times daily 3/8/21   Iris uS MD       Current medications:    Current Facility-Administered Medications   Medication Dose Route Frequency Provider Last Rate Last Admin    sodium chloride flush 0.9 % injection 5-40 mL  5-40 mL IntraVENous 2 times per day Facundo De La O MD        sodium chloride flush 0.9 % injection 5-40 mL  5-40 mL IntraVENous PRN Facundo De La O MD        0.9 % sodium chloride infusion  25 mL IntraVENous PRN Facundo De La O MD        0.45 % sodium chloride infusion   IntraVENous Continuous Facundo De La O MD           Allergies: Allergies   Allergen Reactions    Ciprofloxacin-Ciproflox Hcl Er Other (See Comments)     Elevated creatinine    Heparin      Monitor for thrombocytopenia    Metformin Nausea Only     Abdominal pain, diarrhea    Niacin And Related Other (See Comments)     Burning sensation, severe flushing    Tramadol Hcl      Contraindication to seizure medications    Ultram [Tramadol]      Contraindication to seizure medications    Warfarin      Very difficult to regulate in past    Other      Other reaction(s): very difficult to regulate    Tylenol [Acetaminophen] Other (See Comments)     Interferes with CGM        Problem List:    Patient Active Problem List   Diagnosis Code    Depression F32. A    Hyperlipidemia E78.5    Liver disease K76.9    Restless legs syndrome G25.81    Chronic back pain M54.9, G89.29    Other chest pain R07.89    Irritable bowel syndrome K58.9    Non compliance w medication regimen Z91.14    Hyperlipidemia with target LDL less than 100 E78.5    Sinus tachycardia R00.0    Neuropathy G62.9    Hyperammonemia (HCC) E72.20    Medication overdose, insulin T50.901A    Suicide attempt ([facility]) T14.91XA    Lithium toxicity T56.891A    Nausea & vomiting R11.2    Snoring R06.83    Hypogonadism [id number]    Erectile dysfunction due to diseases classified elsewhere N52.1    Rash R21    Headaches due to old head injury G44.309, S09.90XS    Abdominal wall pain in left lower quadrant R10.32    Bipolar affective disorder ([facility] F31.9    Hypogonadism in male E29.1    Type 2 diabetes mellitus with diabetic neuropathy ([facility]) E11.40    Hypomagnesemia E83.42    Partial symptomatic epilepsy with complex partial seizures, not intractable, without status epilepticus ([facility]) G40.209    Essential hypertension I10    Gastritis K29.70    Gastroesophageal reflux disease K21.9    Mixed hyperlipidemia E78.2    Acute lateral meniscus tear of right knee S83.281A    Recurrent right inguinal hernia K40.91    Bicipital tendinitis of right shoulder M75.21    Carpal tunnel syndrome of right wrist G56.01    Obesity (BMI 30.0-34. 9) E66.9    Lump of axilla R22.30    Familial hypercholesterolemia E78.01    Exertional dyspnea R06.09    Vitamin D deficiency E55.9    Type II diabetes mellitus with manifestations, uncontrolled [id number]    Chondromalacia of knee, right M94.261    Effusion of left knee M25.462    Other intervertebral disc degeneration, lumbar region M51.36    Palpitations R00.2    Splenomegaly R16.1    Steatohepatitis K75.81    Osteopenia V01.14    Follicular neoplasm of thyroid D49.7    Nasal obstruction J34.89    Nasal septal deviation J34.2    MISTI (obstructive sleep apnea) G47.33    Bipolar 1 disorder, depressed ([facility]) F31.9    Status post uvulopalatopharyngoplasty Z98.890    Status post partial thyroidectomy E89.0    Episodic confusion R41.0    MRSA infection within last 3 months Z86.14    Nonhealing nonsurgical wound T14. 8XXA    Breakthrough seizure ([address] G40.919    Partial seizures ([address] R56.9    Tremors of nervous system R25.1    Inflammatory bowel disease K52.9    Chronic skin ulcer, limited to breakdown of skin ([address] L98.491    Tarsal tunnel syndrome G57.50    Ulcer of foot due to type 2 diabetes mellitus ([address] E11.621, L97.509    Recurrent acute deep vein thrombosis (DVT) of lower extremity ([facility]) I82.409    Charcot's joint of left foot M14.672    Tarsal tunnel syndrome of both lower extremities G57.53    Ulcer of right foot with necrosis of bone ([facility]) L97.514    Chest pain R07.9    Unstable angina ([facility]) I20.0    Contracture, left ankle M24.572    Congenital pes planus Q66.50       Past Medical History:        Diagnosis Date    Abnormal thyroid biopsy     s/p left hemithyroidectomy 1/9881 -benign follicular adenoma    Acid reflux     Anemia     resolved - previously seen by Dr. Mago Tanner (due to enlarged spleen)    Anesthesia     seizures with brain surgery due to blood sugar got really high    Bile reflux gastritis     per EGD 11/16/21 - Dr. Pauline Templeton - to start Carafate.     Bipolar disorder ([address]     Blood clot in vein 04/07/2016    Alberteen Aase     Chest pain     previously seeing [address] [place] [address] cardiologist, now seeing Dr. Suzie Castro (LAD bridging on cath 4/2016)    Chronic back pain     Chronic bronchitis ([address]     Dr. Courtney Ashish Colon polyp 08/30/2016    Depression     seeing Tyron Pool DVT (deep venous thrombosis) ([address] [id number]    not on Coumadin due to unable to regulate - Dr. Miah Shin - no etiology found per patient    Esophageal abnormality     nodule     Fatty liver disease, nonalcoholic     U/S 14/1772 Danita    Follicular adenoma of thyroid gland 01/15/2021    s/p lobectomy    Furuncle     legs    History of blood transfusion     History of Doppler ultrasound 05/29/2011    No hemodynamically significant carotid stenosis is identified. A thyroid nodule on each side. Dedicated ultrasound of thyroid gland is suggested to further evaluate if clinically indicated.  History of pulmonary embolism 2007    s/p GFF, related to knee surgery    Hx of blood clots     leg and lung    Hyperlipidemia     severely elevated triglycerides    Hypertension     diastolic    Intracranial arachnoid cyst 11/2012    Dr. Mal Hobson drained, complicated with seizures, DKA    Irritable bowel syndrome     Liver disease     Elsa Marrero - elevated LFT - positive smooth muscle antibody, steatosis per liver bx 3/2014 with Dr. Shruthi Strange (multiple drug resistant organisms) resistance 2012    MRSA (methicillin resistant staph aureus) culture positive     h/o in foot and before brain surgery    Nephrolithiasis     noted on CT abdomen 9/2016, 6/2019    Neuropathy     Pancreatic insufficiency     Positive SANDY (antinuclear antibody)     Dr. Renetta Taylor - first visit in 6/2013    Prolonged emergence from general anesthesia     Restless legs syndrome     Seizures ([address]     started with brain surgery    Sinus tachycardia     Holter 3/2013 - seeing Dr. Lisa Byrd fracture [facility])     clips     Sleep apnea     positive sleep apnea per study 10/2020.     Systolic dysfunction     \"systolic bridge\"  EF 33% ECHO 9/2019    Type II or unspecified type diabetes mellitus without mention of complication, not stated as uncontrolled 2007       Past Surgical History:        Procedure Laterality Date    CARDIAC CATHETERIZATION      2011,5-6 years ago   [address]  03/2012    CARDIAC CATHETERIZATION  [facility]28/2016    [facility]     CARPAL TUNNEL RELEASE  01/2017    CHOLECYSTECTOMY  2004    COLONOSCOPY  2012    COLONOSCOPY  08/2016    2 tubular adenomas - reportedly needs repeat scope in 6 months    CRANIOTOMY  11/2012    arachnoid cyst drainage    EKG 12-LEAD  10/18/2015         ENDOSCOPY, COLON, DIAGNOSTIC      FOOT SURGERY  05/20/2022    right foot metatarsal phalangeal joint resection and placement of antibiotic beads, left foot Achilles tendon lengthening    HERNIA REPAIR Right 1996    [facility]    INGUINAL HERNIA REPAIR Right 09/15/2016    Robotic assisted    KNEE ARTHROSCOPY Right 2016    KNEE SURGERY Left 2007    acl and debrided twice    OTHER SURGICAL HISTORY  05/31/2011    Tilt table was associated w/ nonspecific symptoms or nausea, otherwise no significant dizziness or syncope. No significant hemodynamic changes. Otherwise, unremarkable tilt table test after 30 minutes of tilting.  OTHER SURGICAL HISTORY  01/20/2017    RIGHT SHOULDER ARTHROSCOPY, OPEN STAN, OPEN ACROMIOPLASTY, BICEP TENDESIS, RIGHT CARPAL TUNNEL RELEASE    SHOULDER SURGERY Right 01/2007    THYROID LOBECTOMY N/A 01/15/2021    THYROID LOBECTOMY, UVULOPALATOPHARYNGOPLAST LAOP performed by Soy Taylor MD at [address] [place] [address] ECHOCARDIOGRAM  03/05/2011    LV size and systolic function normal. EF 55-65%.      UPPER GASTROINTESTINAL ENDOSCOPY  2012    UPPER GASTROINTESTINAL ENDOSCOPY  2016    Dr. Ian Kumar Left 10/22/2019    EGD DILATION SAVORY performed by Paresh Cooper MD at [address] [place] [address] ENDOSCOPY Left 10/22/2019    EGD BIOPSY performed by Paresh Cooper MD at [address] [place] [address] ENDOSCOPY N/A 11/16/2021    EGD performed by Paresh Cooper MD at [address] [place] [address] ENDOSCOPY Left 11/16/2021    EGD BIOPSY performed by Paresh Cooper MD at [facility]   [facility]  2007       Social History:    Social History     Tobacco Use    Smoking status: Never    Smokeless tobacco: Never   Substance Use Topics    Alcohol use: No     Alcohol/week: 0.0 standard drinks                                Counseling given: Not Answered      Vital HCG (If Applicable): No results found for: PREGTESTUR, PREGSERUM, HCG, HCGQUANT     ABGs: No results found for: PHART, PO2ART, UTS0YDV, PHY2FYA, BEART, T6KSFKCZ     Type & Screen (If Applicable):  Lab Results   Component Value Date    LABRH NEG 04/28/2016       Drug/Infectious Status (If Applicable):  Lab Results   Component Value Date/Time    HEPCAB Negative 01/20/2021 05:03 PM       COVID-19 Screening (If Applicable):   Lab Results   Component Value Date/Time    COVID19 DETECTED 08/22/2022 04:22 PM           Anesthesia Evaluation    Airway:           Dental:          Pulmonary:   (+) shortness of breath:  sleep apnea: on CPAP,                             Cardiovascular:    (+) hypertension:, angina:, EWING:,                   Neuro/Psych:   (+) seizures:, neuromuscular disease:, headaches:, psychiatric history:            GI/Hepatic/Renal:   (+) GERD:, liver disease:,           Endo/Other:    (+) Diabetes, . Abdominal:             Vascular:           Other Findings:           Anesthesia Plan        NICANOR Lovett CRNA   11/1/2022

## 2022-11-01 NOTE — POST SEDATION
[facility]  Sedation/Analgesia Post Sedation Record    Patient: Lacie Chau: 1970  [facility] Rec#: [id number] Acc#: [id number]   Procedure Performed By: Rachell Colin MD  Primary Care Physician: Jacki Davalos MD    POST-PROCEDURE    Physicians/Assistants: Rachell Colin MD  Procedure Performed:    Sedation/Anesthesia:     Estimated Blood Loss:          ml  Specimens Removed:  []None [x]Other:      Disposition of Specimen:  [x]Pathology []Other      Complications:   [x]None Immediate []Other:     Post-procedure Diagnosis/Findings:             Recommendations:     polyps          Rachell Colin MD, MD [facility]  Electronically signed 11/1/2022 at 1:54 PM

## 2022-11-02 NOTE — OP NOTE
[address] [place] [address]                                OPERATIVE REPORT    PATIENT NAME: Marcial Parekh                   :        1970  MED REC NO:   [id number]                           ROOM:  ACCOUNT NO:   [de-identified]                           ADMIT DATE: 2022  PROVIDER:     Amber Pantoja M.D.    Claire Kong OF PROCEDURE:  2022    SURGEON:  Amber Pantoja MD    PROCEDURE:  Colonoscopy plus snare. INDICATION FOR THE PROCEDURE:  High-risk screening colon. The patient  had previous adenomas. See the preop note for rest of clinicals. ASA CLASSIFICATION:  III. MEDICATION:  Per Anesthesia. BIOPSY:  Yes. PHOTOGRAPHS:  Yes. DESCRIPTION OF THE PROCEDURE:  Informed consent was obtained after  explaining risks and benefits of the procedure and conscious sedation. Possible complications including bleeding, perforation, reaction to  medicine, but not limiting to death, were discussed. CFQ-180 colonoscope was advanced to the cecum. Landmarks were  identified. No polyp or mass seen in the cecum. In the ascending  colon, close to the hepatic flexure, there was a polyp removed with cold  snare. In the transverse colon, close to the splenic flexure, there was  a polyp removed with cold snare. No polyp or mass in the descending,  sigmoid or rectum. Tolerated the procedure well. IMPRESSION:  1.  Multiple colon polyps, post snare polypectomy. 2.  Previous history of tubular adenomas. RECOMMENDATION:  Followup colonoscopy in five years if general health is  good. BLOOD LOSS:  Less than 5 mL for the procedure.         Adán Cervantes M.D.    D: 2022 14:04:30       T: 2022 23:21:49     [email or web address]  Job#: [id number]     Doc#: [id number]    CC:  Elizabeth Siegel M.D.

## 2022-11-03 ENCOUNTER — HOSPITAL ENCOUNTER (OUTPATIENT)
Dept: PHYSICAL THERAPY | Age: 52
Setting detail: THERAPIES SERIES
Discharge: HOME | End: 2022-11-03
Payer: MEDICARE

## 2022-11-03 PROCEDURE — 97110 THERAPEUTIC EXERCISES: CPT

## 2022-11-03 NOTE — DISCHARGE SUMMARY
[address] [facility]  PHYSICAL THERAPY  [] EVALUATION  [] DAILY NOTE (LAND) [] DAILY NOTE (AQUATIC ) [] PROGRESS NOTE [x] DISCHARGE NOTE    [x] OUTPATIENT REHABILITATION CENTER [facility]   [] [place] [address]    [] [facility]   [] Debo Barron    Date: 11/3/2022  Patient Name:  Shannon Tee  : 1970  MRN: [id number]  CSN: [id number]    Referring Practitioner DORYS Grossman*   Diagnosis Spinal stenosis, lumbar region without neurogenic claudication [M48.061]  Spondylosis without myelopathy or radiculopathy, lumbar region [M47.816]  Spondylosis without myelopathy or radiculopathy, lumbar region [M47.816]    Treatment Diagnosis Chronic lumbar pain, decreased hip ROM, core and BLE weakness, impaired balance, difficulty walking   Date of Evaluation 10/6/22    Additional Pertinent History DVT, HTN, liver disease, IBS, GERD, DMT2, MISTI, depression, suicide attempt, bipolar, seizures, neuropathy, charcot foot with surgery on left foot since May, obesity, memory issues. Pt will be placed in permanent boot on 10/13/22. Pt uses bone stimulator 5 hours/day. Functional Outcome Measure Used Modified Oswestry   Functional Outcome Score 19/50= 38% (10/6/22) 20/50=40% (11/3/22)      Insurance: Primary: Payor: Marlene Engle /  /  / ,   Secondary:    Authorization Information: PRECERTIFICATION REQUIRED:  No  INSURANCE THERAPY BENEFIT:  Patient has unlimited visits based on medical necessity  Benefit will not cover maintenance or preventative treatment. AQUATIC THERAPY COVERED:   Yes  MODALITIES COVERED:  Yes, with the exception of iontophoresis and hot packs/cold packs  TELEHEALTH COVERED: Yes   Visit # 6, 6/10 for progress note   Visits Allowed: unlimited   Recertification Date:    Physician Follow-Up: 22; MRI scheduled for 22; neurosurgeon Dr. Mau Rayo 22   Physician Orders:    History of Present Illness: Pt presents with chronic lumbar pain.  Years ago, pt had injections in back which seemed to help. Pain has progressively worsened since July and pt feels its due to wearing the boot. Pain aggravated with standing and walking >5 minutes, lifting and cleaning. Pain has started radiating into lower buttocks ~1 week ago. Pain alleviated with rest/sitting. Pt denies taking pain medication and unable to take Tylenol or ibuprofen d/t stomach issues. Pt uses heating pad without relief. SUBJECTIVE: Patient reports back and feet are sore. Pt working on [employer] daily. Pt notes pain has worsened. Pt notes Dr. Mcpherson Postal ordered MRI as doctor feels pt has issues higher up in the back.      TREATMENT   Precautions: NWB LLE but pt not compliant   Pain: 7/10 low back radiating into buttocks    \"X in shaded column indicates activity completed today    *\" next to exercise/intervention indicates progression   Modalities Parameters/  Location  Notes   IFC w/MHP 15 min, 5.2 V   Skin intact pre/post treatment assessment, no signs of adverse reactions  Tolerated treatment today               Manual Therapy Time/Technique  Notes                     Exercise/Intervention   Notes   LTR 5x10 sec   Increased pain   SKTC with towel 5x10 sec      Piriformis stretch 5x10 sec             Abdominal bracing 10x5 sec   painful   Posterior pelvic tilts 5x5 sec   Reports increased pain   PPT marching 2x5   Increased pain   PPT alternating arms 2x5             Seated       Swiss ball roll outs forward/lateral 5x10 sec      Scapular retractions 2*x10 5 sec     Posterior pelvic tilts x10 5 sec     PPT alternating arms x10      R hamstring/calf stretch with strap 5x10 sec      Hip adduction iso. with ball 10x5 sec   Cues for upright posture          Objective/goal assessment/pt education   x      Specific Interventions Next Treatment: IFC estim with MHP, manual as needed, hip stretching as tolerance, core and BLE strengthening, balance training when released by foot doctor to weight bear    Activity/Treatment Tolerance:  []  Patient tolerated treatment well  []  Patient limited by fatigue  [x]  Patient limited by pain   []  Patient limited by medical complications  []  Other:     Assessment: Patient has not progressed toward goals. Pain is getting worse despite pt being compliant with HEP. Pt continues to have very tight hips and hamstrings and weak core and legs. Pt has poor postural awareness. Pt does take care of household chores, like dishes, cooking, laundry and cleaning but requires frequent rest breaks d/t pain. Pt gets no relief from modalities. Pt scheduled for MRI next week and wants to discontinue PT d/t aggravation of pain. Goals    Patient Goal: Alleviate pain    Short Term Goals: 4 weeks  Patient will tolerate standing and walking >10 minutes without increased pain for ease managing things around the house. GOAL NOT MET: Pt unable to stand or walk 10 minutes without increased pain. Discontinue Goal  Patient will demonstrate good core engagement and postural awareness during therapy session with <3 cues  to decrease spinal strain from cooking and cleaning. GOAL NOT MET: Pt sits with slumped posture, corrects when pain increases but difficulty maintaining. Discontinue Goal  Patient will have <50% bilateral hip IR and ER AROM restriction to improve flexibility for ADLs. GOAL NOT MET: B hip IR limited 15-20%, ER limited 30-40%. Discontinue Goal    Long Term Goals: 12 weeks  Patient will demonstrate good core engagement and postural awareness during therapy session without cues to tolerate doing laundry. GOAL NOT MET: see above. Discontinue Goal  Patient will improve BLE strength to 4-/5 for stabilization when standing to do dishes and squatting to retrieve objects. GOAL NOT MET: hip flexion, ABD 3/5 bilateral, ER 5/5 left, 4-/5 right; hamstring 4-/5 bilateral, quad 4/5 bilateral, needs breaks when doing the dishes d/t pain.   Discontinue Goal  Patient will hold bilateral SLR x8 seconds with good core engagement to improve stabilization when lifting. GOAL NOT MET: severe sharp pain with bilateral SLR so unable to hold. Discontinue Goal  Patient will be independent and compliant with HEP to achieve above goals. GOAL MET:  Pt verbalizes compliance with HEP daily, despite it increasing pain. Discontinue Goal      Patient Education:   [x]  HEP/Education Completed: try to stretch daily, do stabilization exercises every other day to help manage pain  [employer] Access Code: [email or web address]  []  No new Education completed  [x]  Reviewed Prior HEP      [x]  Patient verbalized and/or demonstrated understanding of education  []  Patient unable to verbalize and/or demonstrate understanding of education provided. Will continue education. []  Barriers to learning:     PLAN:  Treatment Recommendations: Strengthening, Range of Motion, Balance Training, Functional Mobility Training, Manual Therapy - Soft Tissue Mobilization, Pain Management, Home Exercise Program, Patient Education, Aquatics, and Modalities    []  Plan of care initiated. Plan to see patient 1-2 times per week for 8 weeks to address the treatment planned outlined above.   []  Continue with current plan of care  []  Modify plan of care as follows:    []  Hold pending physician visit  [x]  Discharge    Time In 0909   Time Out 0923   Timed Code Minutes: 14 min   Total Treatment Time: 14 min     Electronically Signed by: Gabriele Calvillo PT

## 2022-11-04 ENCOUNTER — CARE COORDINATION (OUTPATIENT)
Dept: CARE COORDINATION | Age: 52
End: 2022-11-04

## 2022-11-04 NOTE — CARE COORDINATION
Ambulatory Care Coordination Note  11/4/2022    ACC: Macodore March    I spoke with the patient for continued Care Coordination follow up and education. Endoscopy completed on 11/1. Patient had PT re-eval yesterday. Pain is getting worse despite pt being compliant with HEP. Pt continues to have very tight hips and hamstrings and weak core and legs. Pt scheduled for MRI next week and wants to discontinue PT d/t aggravation of pain. Pt notes Dr. Bladimir Lewis ordered MRI as doctor feels pt has issues higher up in the back. MRI scheduled on 11/8 and patient f/u with Dr. Pool Christian on 11/9. BS's continue to fluctuate. This AM BS was 210. Patient states he is taking proper dose of insulin. Reinforced importance of f/u with specialists and review upcoming appts. I advised patient to contact PCP office if needed. No further needs at this time. Lab Results       None            Care Coordination Interventions    Referral from Primary Care Provider: No  Suggested Interventions and Community Resources  Disease Specific Clinic: Completed (Comment: diabetic clinic)  Meals on Wheels: Declined  Medi Set or Pill Pack: Declined (Comment: pt sets up pill box weekly)  Pharmacist: Completed (Comment: sees pharmacist through diabetic clinic)  Transportation Support: Declined  Zone Management Tools: Completed          Goals Addressed    None         Prior to Admission medications    Medication Sig Start Date End Date Taking? Authorizing Provider   lacosamide (VIMPAT) 200 MG tablet Take 200 mg by mouth 2 times daily. Historical Provider, MD   gabapentin (NEURONTIN) 600 MG tablet Take 1 tablet by mouth 4 times daily for 90 days.  8/29/22 11/27/22  Mary Ellen Arvizu, DO   insulin glargine [facility]) 100 UNIT/ML injection pen Inject 120 Units into the skin 2 times daily 8/29/22   Mary Ellen Arvizu, DO   Dulaglutide 0.75 MG/0.5ML SOPN Inject 0.75 mg into the skin once a week 8/29/22   Mary Ellen Arvizu, DO   lisinopril (PRINIVIL;ZESTRIL) 5 MG tablet Take 1 tablet by mouth daily 8/29/22   Lauren Rahman DO   metoprolol tartrate (LOPRESSOR) 25 MG tablet Take 1 tablet by mouth 2 times daily 8/29/22   Lauren Rahman DO   ARIPiprazole (ABILIFY) 10 MG tablet Take 10 mg by mouth daily Dose reduced by psychiatry 8/30/22    Historical Provider, MD   rOPINIRole (REQUIP) 1 MG tablet Take 1 tablet by mouth 3 times daily 7/4/22   Kang Tellez MD   promethazine (PHENERGAN) 12.5 MG tablet Take 1-2 tablets by mouth every 8 hours as needed for Nausea 6/20/22   Kang Tellez MD   aspirin 81 MG chewable tablet Take 1 tablet by mouth daily 6/15/22   Thad Stein DO   Insulin Pen Needle 32G X 4 MM MISC Use 5 times daily with insulin pens 5/18/22   Kang Tellez MD   vitamin D (ERGOCALCIFEROL) 1.25 MG (78430 UT) CAPS capsule Take 1 capsule by mouth once a week 4/26/22   Kang Tellez MD   blood glucose test strips (ONETOUCH ULTRA) strip 1 each by In Vitro route 5 times daily DX: E11.65 Dispense Onetouch Ultra 2 test strips 2/22/22   Kang Tellez MD   sucralfate (CARAFATE) 1 GM tablet Take 1 g by mouth 4 times daily    Historical Provider, MD   DULoxetine (CYMBALTA) 60 MG extended release capsule Take 120 mg by mouth daily    Historical Provider, MD   levETIRAcetam (KEPPRA) 1000 MG tablet Take 2 tablets by mouth 2 times daily  Patient taking differently: Take 1,000 mg by mouth 2 times daily 8/4/21   Ruth Alfaro MD   insulin lispro (HUMALOG) 100 UNIT/ML injection vial Takes 40 units with meals plus personal sliding scale    Historical Provider, MD   pantoprazole (PROTONIX) 40 MG tablet Take 1 tablet by mouth 2 times daily 3/8/21   Kang Tellez MD       Future Appointments   Date Time Provider Staci Jay   11/22/2022  9:00 AM Alex Toure   11/29/2022  8:30 AM Bladimir Hobson, APRN - CNP N SRPX Pain P - [place]   1/3/2023  1:40 PM Tabitha Knott MD Neuro [facility] Neurology -

## 2022-11-21 ENCOUNTER — CARE COORDINATION (OUTPATIENT)
Dept: CARE COORDINATION | Age: 52
End: 2022-11-21

## 2022-11-21 SDOH — ECONOMIC STABILITY: HOUSING INSECURITY: IN THE LAST 12 MONTHS, HOW MANY PLACES HAVE YOU LIVED?: 1

## 2022-11-21 SDOH — HEALTH STABILITY: PHYSICAL HEALTH: ON AVERAGE, HOW MANY MINUTES DO YOU ENGAGE IN EXERCISE AT THIS LEVEL?: 0 MIN

## 2022-11-21 SDOH — ECONOMIC STABILITY: HOUSING INSECURITY
IN THE LAST 12 MONTHS, WAS THERE A TIME WHEN YOU DID NOT HAVE A STEADY PLACE TO SLEEP OR SLEPT IN A SHELTER (INCLUDING NOW)?: NO

## 2022-11-21 SDOH — ECONOMIC STABILITY: TRANSPORTATION INSECURITY
IN THE PAST 12 MONTHS, HAS THE LACK OF TRANSPORTATION KEPT YOU FROM MEDICAL APPOINTMENTS OR FROM GETTING MEDICATIONS?: NO

## 2022-11-21 SDOH — ECONOMIC STABILITY: INCOME INSECURITY: IN THE LAST 12 MONTHS, WAS THERE A TIME WHEN YOU WERE NOT ABLE TO PAY THE MORTGAGE OR RENT ON TIME?: NO

## 2022-11-21 SDOH — ECONOMIC STABILITY: TRANSPORTATION INSECURITY
IN THE PAST 12 MONTHS, HAS LACK OF TRANSPORTATION KEPT YOU FROM MEETINGS, WORK, OR FROM GETTING THINGS NEEDED FOR DAILY LIVING?: NO

## 2022-11-21 SDOH — HEALTH STABILITY: PHYSICAL HEALTH: ON AVERAGE, HOW MANY DAYS PER WEEK DO YOU ENGAGE IN MODERATE TO STRENUOUS EXERCISE (LIKE A BRISK WALK)?: 0 DAYS

## 2022-11-21 ASSESSMENT — LIFESTYLE VARIABLES
HOW MANY STANDARD DRINKS CONTAINING ALCOHOL DO YOU HAVE ON A TYPICAL DAY: PATIENT DOES NOT DRINK
HOW OFTEN DO YOU HAVE A DRINK CONTAINING ALCOHOL: NEVER

## 2022-11-21 ASSESSMENT — SOCIAL DETERMINANTS OF HEALTH (SDOH)
HOW OFTEN DO YOU ATTENT MEETINGS OF THE CLUB OR ORGANIZATION YOU BELONG TO?: NEVER
DO YOU BELONG TO ANY CLUBS OR ORGANIZATIONS SUCH AS CHURCH GROUPS UNIONS, FRATERNAL OR ATHLETIC GROUPS, OR SCHOOL GROUPS?: NO
IN A TYPICAL WEEK, HOW MANY TIMES DO YOU TALK ON THE PHONE WITH FAMILY, FRIENDS, OR NEIGHBORS?: MORE THAN THREE TIMES A WEEK

## 2022-11-21 NOTE — CARE COORDINATION
Ambulatory Care Coordination Note  11/21/2022    ACC: Junior Richardson, RN    Jenny Sánchez is being followed by care coordination for education and assistance in managing his chronic conditions and healthcare needs. Spoke with Dhara today. Pt has h/o:  Dhara has h/o: Diabetes, DVT, Charcot's joint, Bipolar, Seizures, HTN, GERD, IBS. spoke with Dhara today for f/u. Diabetes-monitoring BS's. BS's continue to fluctuate. Pt reports compliance with medications, including insulin. Has not been eating much recently d/t stomach pain which is intermittent for pt. Pt has had BS's in 300's. Reports that he is staying adequately hydrated. Advised to call if BS's continue to run in 300's. No episodes of hypoglycemia. Encouraged to keep diet bland. Pt had cancelled appt with PCP back in Oct.  Has not rescheduled at this time. Appt rescheduled for Jan.  Advised pt to call if needing to be seen earlier. Charcot's joint-following with Dr. Randall Brown. Having pain and has noticed callus on foot. Has appt to be seen tomorrow. Remains in [facility] boot at this time. Back pain-following with Dr. Forte Sessions office. Has appt tomorrow. Had recent MRI. Reports he was informed no surgery. Continues to have pain in low back. No longer receiving outpt PT. Seizures-had 1 a couple wks ago. Follows with Dr. Mirtha Gayle in [place]. Has appt in Jan.   Recent colonoscopy. Had 2 polyps removed. Having some diarrhea. Encouraged fluids. Offered patient enrollment in the Remote Patient Monitoring (RPM) program for in-home monitoring: NA.  Plan of care:  Continue monitoring BS's. Call next wk if BS's do not improve  Continue taking insulin as prescribed. Stay hydrated. Incorporate electrolyte replacement drinks if needed  Keep diet bland  F//u with Dr. Randall Brown and Dr. Forte Sessions office tomorrow  Appt arranged with PCP for Jan.  Pt is aware to call for earlier appt if new concerns arise.    Reinforce Diabetes zone tools-continue to follow with diabetic clinic  Will evaluate readiness and needs with next call. Denies additional needs today. Diabetes Assessment    Medic Alert ID: No  Meal Planning: Avoidance of concentrated sweets, Carb counting   How often do you test your blood sugar?: Meals   Do you have barriers with adherence to non-pharmacologic self-management interventions? (Nutrition/Exercise/Self-Monitoring): No   Have you ever had to go to the ED for symptoms of low blood sugar?: No       Do you have hyperglycemia symptoms?: No   Do you have hypoglycemia symptoms?: No   Last Blood Sugar Value: 330   Blood Sugar Monitoring Regimen: Before Meals, At Bedtime   Blood Sugar Trends: Fluctuating         and   General Assessment    Do you have any symptoms that are causing concern?: Yes  Progression since Onset: Gradually Worsening  Reported Symptoms: Pain (Comment: left foot pain, callus to left foot. has appt with Dr. Zabala/podiatry tomorrow.)         Lab Results       None            Care Coordination Interventions    Referral from Primary Care Provider: No  Suggested Interventions and Community Resources  Disease Specific Clinic: Completed (Comment: diabetic clinic)  Meals on Wheels: Declined  Medi Set or Pill Pack: Declined (Comment: pt sets up pill box weekly)  Pharmacist: Completed (Comment: sees pharmacist through diabetic clinic)  Transportation Support: Declined  Zone Management Tools: Completed          Goals Addressed                   This Visit's Progress     Conditions and Symptoms   On track     I will schedule office visits, as directed by my provider. I will keep my appointment or reschedule if I have to cancel. I will notify my provider of any barriers to my plan of care. I will follow my Zone Management tool to seek urgent or emergent care. I will notify my provider of any symptoms that indicate a worsening of my condition. diabetes    Barriers: financial, overwhelmed by complexity of regimen, stress, and time constraints  Plan for overcoming my barriers: work with diabetic clinic, education on diabetes zones. Monitor BS's closely. Complete med assistance forms  Confidence: 9/10  Anticipated Goal Completion Date: 12/19/22                Prior to Admission medications    Medication Sig Start Date End Date Taking? Authorizing Provider   lacosamide (VIMPAT) 200 MG tablet Take 200 mg by mouth 2 times daily. Historical Provider, MD   gabapentin (NEURONTIN) 600 MG tablet Take 1 tablet by mouth 4 times daily for 90 days.  8/29/22 11/27/22  Ai Gil DO   insulin glargine [facility]) 100 UNIT/ML injection pen Inject 120 Units into the skin 2 times daily 8/29/22   Ai Gil DO   Dulaglutide 0.75 MG/0.5ML SOPN Inject 0.75 mg into the skin once a week 8/29/22   Ai Gil DO   lisinopril (PRINIVIL;ZESTRIL) 5 MG tablet Take 1 tablet by mouth daily 8/29/22   Ai Gil DO   metoprolol tartrate (LOPRESSOR) 25 MG tablet Take 1 tablet by mouth 2 times daily 8/29/22   Ai Gil DO   ARIPiprazole (ABILIFY) 10 MG tablet Take 10 mg by mouth daily Dose reduced by psychiatry 8/30/22    Historical Provider, MD   rOPINIRole (REQUIP) 1 MG tablet Take 1 tablet by mouth 3 times daily 7/4/22   Miguel Cox MD   promethazine (PHENERGAN) 12.5 MG tablet Take 1-2 tablets by mouth every 8 hours as needed for Nausea 6/20/22   Miguel Cox MD   aspirin 81 MG chewable tablet Take 1 tablet by mouth daily 6/15/22   Milly MeierperDO   Insulin Pen Needle 32G X 4 MM MISC Use 5 times daily with insulin pens 5/18/22   Miguel Cox MD   vitamin D (ERGOCALCIFEROL) 1.25 MG (14253 UT) CAPS capsule Take 1 capsule by mouth once a week 4/26/22   Miguel Cox MD   blood glucose test strips (ONETOUCH ULTRA) strip 1 each by In Vitro route 5 times daily DX: E11.65 Dispense Onetouch Ultra 2 test strips 2/22/22   Miguel Cox MD   sucralfate (CARAFATE) 1 GM tablet Take 1 g by mouth 4 times daily    Historical Provider, MD DULoxetine (CYMBALTA) 60 MG extended release capsule Take 120 mg by mouth daily    Historical Provider, MD   levETIRAcetam (KEPPRA) 1000 MG tablet Take 2 tablets by mouth 2 times daily  Patient taking differently: Take 1,000 mg by mouth 2 times daily 8/4/21   Celestine Ewdards MD   insulin lispro (HUMALOG) 100 UNIT/ML injection vial Takes 40 units with meals plus personal sliding scale    Historical Provider, MD   pantoprazole (PROTONIX) 40 MG tablet Take 1 tablet by mouth 2 times daily 3/8/21   Otf Brasher MD       Future Appointments   Date Time Provider Staci Misty   11/29/2022  8:30 AM NICANOR Parker - CNP N [facility] Pain [facility] [place]   1/16/2023 11:00 AM Otf Brasher MD [address] [facility]   1/24/2023  1:00 PM Julian Johnson MD Neuro [facility] Neurology -

## 2022-12-02 DIAGNOSIS — G40.909 SEIZURE DISORDER (HCC): Primary | ICD-10-CM

## 2022-12-02 NOTE — TELEPHONE ENCOUNTER
Hector called in today stating that he would like a refill for his Keppra. He said that he was getting it filled by a different provider but now he is following up with Dr Zohreh Guerrier for his Seizures. Patient said that he has enough medication for a week. Can you please advise.        [facility] [place]

## 2022-12-02 NOTE — TELEPHONE ENCOUNTER
Patient notified. Patient stated that his prescription states Keppra 500 mg four tablets in the morning and four tablets at night. Can you please clarify prescription.

## 2022-12-05 RX ORDER — LEVETIRACETAM 1000 MG/1
2000 TABLET ORAL 2 TIMES DAILY
Qty: 120 TABLET | Refills: 3 | Status: SHIPPED | OUTPATIENT
Start: 2022-12-05

## 2022-12-07 ENCOUNTER — CARE COORDINATION (OUTPATIENT)
Dept: CARE COORDINATION | Age: 52
End: 2022-12-07

## 2022-12-07 DIAGNOSIS — E11.42 TYPE 2 DIABETES MELLITUS WITH DIABETIC POLYNEUROPATHY, WITH LONG-TERM CURRENT USE OF INSULIN (HCC): ICD-10-CM

## 2022-12-07 DIAGNOSIS — Z79.4 TYPE 2 DIABETES MELLITUS WITH DIABETIC POLYNEUROPATHY, WITH LONG-TERM CURRENT USE OF INSULIN (HCC): ICD-10-CM

## 2022-12-07 NOTE — CARE COORDINATION
Attempted to reach [facility] today for care coordination f/u. No answer. Message left to return call.

## 2022-12-08 RX ORDER — LANCETS
EACH MISCELLANEOUS
Qty: 100 EACH | Refills: 5 | Status: SHIPPED | OUTPATIENT
Start: 2022-12-08

## 2022-12-08 RX ORDER — BLOOD SUGAR DIAGNOSTIC
STRIP MISCELLANEOUS
Qty: 100 EACH | Refills: 5 | Status: SHIPPED | OUTPATIENT
Start: 2022-12-08

## 2022-12-15 ENCOUNTER — CARE COORDINATION (OUTPATIENT)
Dept: CARE COORDINATION | Age: 52
End: 2022-12-15

## 2022-12-23 ENCOUNTER — CARE COORDINATION (OUTPATIENT)
Dept: CARE COORDINATION | Age: 52
End: 2022-12-23

## 2022-12-23 NOTE — CARE COORDINATION
Attempted to reach [facility] today for f/u. No answer. Message left to return call. room air

## 2023-01-05 ENCOUNTER — CARE COORDINATION (OUTPATIENT)
Dept: CARE COORDINATION | Age: 53
End: 2023-01-05

## 2023-01-05 NOTE — CARE COORDINATION
Attempted to reach patient for continued Care Coordination follow up and education. Patient was unavailable at the time of my call, and a generic voicemail message was left asking patient to return my call at [phone].

## 2023-01-11 ENCOUNTER — CARE COORDINATION (OUTPATIENT)
Dept: CARE COORDINATION | Age: 53
End: 2023-01-11

## 2023-01-12 ENCOUNTER — TELEPHONE (OUTPATIENT)
Dept: NEUROLOGY | Age: 53
End: 2023-01-12

## 2023-01-12 NOTE — TELEPHONE ENCOUNTER
Pam Zavala called in stating he is having the feelings that lead to having a seizure but has not had one yet that started this am. He said he doesn't think any factors would encourage the feelings. He did state he has not been sleeping well only getting 4-5 hours each night. He said he has taken his medications as prescribed and takes nothing for sleep at this time. He also said he has a numbness on his left side of his head, this has been going on for the past couple of days and has had that feeling previously. His last seizure was 12/2022. He currently takes 2000mg of Keppra BID, Gabapentin 600mg 4xs daily, and Vimpat 200mg BID. His previous seizure in December started with head numbness, dropping of his right eye, incontinence and rolling of the eyes. He is not exactly sure but he knows it lasted longer than 5 minutes. He said he had his previous seizure before that in September of 2022 with similar symptoms. Please advise.

## 2023-01-12 NOTE — TELEPHONE ENCOUNTER
I would increase the dose of vimpat to 250 mg BID and if patient continues to not feel well, ok to add to clinic

## 2023-01-13 DIAGNOSIS — G40.909 SEIZURE DISORDER (HCC): Primary | ICD-10-CM

## 2023-01-13 RX ORDER — LACOSAMIDE 50 MG/1
TABLET ORAL
Qty: 60 TABLET | Refills: 2 | Status: SHIPPED | OUTPATIENT
Start: 2023-01-13 | End: 2023-04-13

## 2023-01-13 RX ORDER — LACOSAMIDE 200 MG/1
TABLET ORAL
Qty: 60 TABLET | Refills: 2 | Status: SHIPPED | OUTPATIENT
Start: 2023-01-13 | End: 2023-04-13

## 2023-01-16 ENCOUNTER — OFFICE VISIT (OUTPATIENT)
Dept: INTERNAL MEDICINE CLINIC | Age: 53
End: 2023-01-16

## 2023-01-16 VITALS
BODY MASS INDEX: 35.81 KG/M2 | TEMPERATURE: 98 F | HEIGHT: 74 IN | HEART RATE: 92 BPM | WEIGHT: 279 LBS | DIASTOLIC BLOOD PRESSURE: 70 MMHG | SYSTOLIC BLOOD PRESSURE: 104 MMHG

## 2023-01-16 DIAGNOSIS — E78.2 MIXED HYPERLIPIDEMIA: ICD-10-CM

## 2023-01-16 DIAGNOSIS — Z79.4 TYPE 2 DIABETES MELLITUS WITH HYPERGLYCEMIA, WITH LONG-TERM CURRENT USE OF INSULIN (HCC): Primary | ICD-10-CM

## 2023-01-16 DIAGNOSIS — E55.9 VITAMIN D DEFICIENCY: ICD-10-CM

## 2023-01-16 DIAGNOSIS — E83.42 HYPOMAGNESEMIA: ICD-10-CM

## 2023-01-16 DIAGNOSIS — E89.0 S/P PARTIAL THYROIDECTOMY: ICD-10-CM

## 2023-01-16 DIAGNOSIS — Z79.4 TYPE 2 DIABETES MELLITUS WITH DIABETIC NEUROPATHY, WITH LONG-TERM CURRENT USE OF INSULIN (HCC): ICD-10-CM

## 2023-01-16 DIAGNOSIS — R10.9 RIGHT SIDED ABDOMINAL PAIN: ICD-10-CM

## 2023-01-16 DIAGNOSIS — E11.65 TYPE 2 DIABETES MELLITUS WITH HYPERGLYCEMIA, WITH LONG-TERM CURRENT USE OF INSULIN (HCC): Primary | ICD-10-CM

## 2023-01-16 DIAGNOSIS — I10 PRIMARY HYPERTENSION: ICD-10-CM

## 2023-01-16 DIAGNOSIS — E11.40 TYPE 2 DIABETES MELLITUS WITH DIABETIC NEUROPATHY, WITH LONG-TERM CURRENT USE OF INSULIN (HCC): ICD-10-CM

## 2023-01-16 DIAGNOSIS — E66.9 OBESITY (BMI 30-39.9): ICD-10-CM

## 2023-01-16 DIAGNOSIS — R19.7 DIARRHEA, UNSPECIFIED TYPE: ICD-10-CM

## 2023-01-16 LAB — HBA1C MFR BLD: 8.7 % (ref 4.3–5.7)

## 2023-01-16 RX ORDER — GABAPENTIN 600 MG/1
600 TABLET ORAL 4 TIMES DAILY
Qty: 120 TABLET | Refills: 2 | Status: SHIPPED | OUTPATIENT
Start: 2023-01-16 | End: 2023-04-16

## 2023-01-16 RX ORDER — ERGOCALCIFEROL 1.25 MG/1
50000 CAPSULE ORAL WEEKLY
Qty: 12 CAPSULE | Refills: 1 | Status: SHIPPED | OUTPATIENT
Start: 2023-01-16

## 2023-01-16 RX ORDER — GABAPENTIN 600 MG/1
600 TABLET ORAL 4 TIMES DAILY
COMMUNITY
End: 2023-01-16

## 2023-01-16 RX ORDER — LISINOPRIL 5 MG/1
5 TABLET ORAL DAILY
Qty: 90 TABLET | Refills: 1 | Status: SHIPPED | OUTPATIENT
Start: 2023-01-16

## 2023-01-16 ASSESSMENT — PATIENT HEALTH QUESTIONNAIRE - PHQ9
9. THOUGHTS THAT YOU WOULD BE BETTER OFF DEAD, OR OF HURTING YOURSELF: 0
6. FEELING BAD ABOUT YOURSELF - OR THAT YOU ARE A FAILURE OR HAVE LET YOURSELF OR YOUR FAMILY DOWN: 0
SUM OF ALL RESPONSES TO PHQ QUESTIONS 1-9: 10
SUM OF ALL RESPONSES TO PHQ QUESTIONS 1-9: 10
10. IF YOU CHECKED OFF ANY PROBLEMS, HOW DIFFICULT HAVE THESE PROBLEMS MADE IT FOR YOU TO DO YOUR WORK, TAKE CARE OF THINGS AT HOME, OR GET ALONG WITH OTHER PEOPLE: 0
4. FEELING TIRED OR HAVING LITTLE ENERGY: 2
8. MOVING OR SPEAKING SO SLOWLY THAT OTHER PEOPLE COULD HAVE NOTICED. OR THE OPPOSITE, BEING SO FIGETY OR RESTLESS THAT YOU HAVE BEEN MOVING AROUND A LOT MORE THAN USUAL: 0
1. LITTLE INTEREST OR PLEASURE IN DOING THINGS: 2
2. FEELING DOWN, DEPRESSED OR HOPELESS: 0
SUM OF ALL RESPONSES TO PHQ9 QUESTIONS 1 & 2: 2
SUM OF ALL RESPONSES TO PHQ QUESTIONS 1-9: 10
7. TROUBLE CONCENTRATING ON THINGS, SUCH AS READING THE NEWSPAPER OR WATCHING TELEVISION: 0
3. TROUBLE FALLING OR STAYING ASLEEP: 3
SUM OF ALL RESPONSES TO PHQ QUESTIONS 1-9: 10
5. POOR APPETITE OR OVEREATING: 3

## 2023-01-16 NOTE — PATIENT INSTRUCTIONS
You need to call Dr. Brooke Gan about right 1st toe ulcer. Call and reschedule appt with psych. Increase hydration to 64 oz of fluid a day. Please take carafate 4 x a day. Try to start Metamucil or Benefiber daily to absorb some water and make stool more formed. Try probiotic or yogurt to balance gut kenya. Give me a stool sample for C. Diff screen - must be watery specimen. You need to set up with GI - liver testing.

## 2023-01-16 NOTE — PROGRESS NOTES
1970    Chief Complaint   Patient presents with    Diabetes     F/u visit r/s from Oct.     Hypertension    Other     Vitamin D def. Pt is a 46 y.o. male who presents for a follow up visit. He was last seen 8/29/22 for acute issues - he did not come to appt 10/2022. He was having care coordination following him but d/c from this due to inability to make contact 1/11/23. Lumbar back pain - CT noted mild spinal canal stenosis, had mild to moderate foraminal stenosis - referred to pain clinic - they ordered PT - participated in PT 10/6/22- 11/3/22. PT Assessment (at discharge): Patient has not progressed toward goals. Pain is getting worse despite pt being compliant with HEP. Pt continues to have very tight hips and hamstrings and weak core and legs. Pt has poor postural awareness. Pt does take care of household chores, like dishes, cooking, laundry and cleaning but requires frequent rest breaks d/t pain. Pt gets no relief from modalities. Pt scheduled for MRI next week and wants to discontinue PT d/t aggravation of pain. Patient requested referral to neurosurgeon - Dr. Dahlia Wilkins-  MRI done at [facility] 11/8/22 - IMPRESSION:   No evidence for acute fracture or other significant bony pathology. Multilevel disc degeneration and mild facet arthropathy. Small right foraminal disc protrusion producing mild right neuroforaminal   stenosis at L5-S1  Per Dr. Dahlia Wilkins note 11/9/22 - no surgery needed- referred to PMR - Dr. Damon Duncan. Suspected pain may be due to leg discrepancy with walking boot. He saw Dr. Damon Duncan and dx with myofascial pain with possible facet arthropathy - offered trigger point injections and lidocaine cream.      Headache - CT head unchanged - at visit 8/29/22 it was back to baseline - no further work up. Hypertension - increased metoprolol to 25 mg BID and added lisinopril - BP and HR now controlled. DM- uncontrolled - HgA1c 7.2 9/2022 -> 8.7 1/2023.   He had been using Dexcom CGM but got frustrated with it falling off - he sweats a lot - he tried the overlay patch - but still fell off. Reportedly not a candidate for pump per patient. I believe the improvement in FSBS in 9/2022 timeframe was due to increased compliance with DM clinic, increasing insulin. At the time I think he did decrease Coke intake and tried to use Protein drinks to actually have more than one meal a day. Now has fallen off wagon as GI system flaring. Per Lorn Sites last note - recommended 001/463 Lantus and Trulicity 1.5 (start 10/68/20?) through [facility]. He states he is currently on Lantus 120 twice a day, Humalog 30-70 Units 3 x a day based on sugar and how much eating. In past, he was told he was a 1.5 type DM. Suggested he see Endocrinology but he doesn't want to see Dr. Ct Ward and too difficult to go out of town currently. Most recent eye exam: 6/2/20 - Dr. Chantell Strickland. Minimal/mild DM retinopathy. Will get eye exam when other medical issues more stable. Reminded him to get this done. Renal function - normal - eGFR >90 6/14/22. Microalbumin/creatinine ratio: 59 on 12/29/2020. Reminded him to get done now. Positive neuropathy, severe - no foot exam  LDL - 70.86 4/6/21 - Lipid panel due now. No longer on Lipitor. Checking FSBS BID typically, but some days 1-4x a day. GI - did EGD 11/2021 (bile gastritis, suspected gastroparesis, cirrhosis, portal hypertensive gastropathy), suggested colonoscopy, liver bx and gastric emptying study. Fibroscan 10/2021 -fatty liver, no fibrosis per wife. He is off Depakote since 12/2021 which should help since EGD. Today -Not eating last 3 days due to stomach pain and diarrhea. He states this has been an issue since colonoscopy 11/1/22. He has history of bile gastritis on EGD - not using Carafate but encouraged he use it 4 x a day and continue Protonix 40 mg BID - will get appt with Chandra Osman to discuss.   Will order C. diff as watery diarrhea 10x a day since colonoscopy. Add fiber supplement. Colonoscopy 11/1/22 - multiple polyps -tubular adenomas per path report, repeat due in 5 years. Still needs to follow up with GI for liver testing. Seizure disorder - following with neurology in [facility] - Dr. Blanca Villarreal. Patient reports increased seizures over the holidays and Neuro in [facility] increased [address] - but he hasn't started the higher dose as not received from company yet. Per Neuro noted -to increase Vimpat to 250 mg BID. If has more seizures on it - then need to be seen in [facility]. Sore on right 1st toe - need to let podiatry know - has appt 2/1/23 with Dr. Brii Mandujano. He is asking for handicap placard due to CMT of left foot - has special boot that will be permanent. I did give him letter for this today. He has a bone stimulator to wear 5 hours a day. Hyperlipidemia - he has DM, and atherosclerosis of aorta noted incidentally on CT lumbar spine. He was on Lipitor 10 mg daily/fenofibrate till he stopped around 1/2021. Repeat lipids normal 4/2021 so did not restart medication. When he was in hospital for chest pain 6/2022, they started Lipitor 80 mg daily but the patient never started this. We need to get lipid panel now and see where he is at to determine dose of statin. DVT - history of DVT's, last one was on the right LE 12/2021 - her was treated with Pradaxa due to history of multiple DVT now and limited mobility due to infections of lower extremities. He had completed at least 6 months of anticoagulation then he stopped Pradaxa  due to cost - he was told he couldn't get from company as could qualify for Medicaid but applied and Medicaid and was denied. I do not support this decision from a medical perspective - due to his history of multiple DVTs, he should be on some type of anticoagulation. He refuses Coumadin as they were unable to control his INR on this in the past.  The other options - ie Pradaxa, Xarelto, Eliquis are too expensive.   May pursue other company for assistance if patient would fill out paperwork. It has been difficult to get him to do this with Miranda He for other medications in past.    Restless leg syndrome - stable on Requip - on 1.5 mg TID per neurology. Vitamin D deficiency - last level 18 4/2021, he has been given refills of vitamin D 50,000 IU weekly intermittently since that time and not gotten the repeat lab work ordered. Will order vitamin D level again now. Did refill medication. Depression - following with psychiatry. He is on Cymbalta, Abilify. Past Medical History:   Diagnosis Date    Abnormal thyroid biopsy     s/p left hemithyroidectomy 8/4497 -benign follicular adenoma    Acid reflux     Anemia     resolved - previously seen by Dr. Filomena Rodrigues (due to enlarged spleen)    Anesthesia     seizures with brain surgery due to blood sugar got really high    Bile reflux gastritis     per EGD 11/16/21 - Dr. Mariana Martínez - to start Carafate. Bipolar disorder ([facility] [address] [address]     Cancer ([facility] [address] [facility]     Chest pain     previously seeing [facility] cardiologist, now seeing Dr. Ashutosh Crockett (LAD bridging on cath 4/2016), no CAD per [facility] 6/2022. Chronic back pain     Chronic bronchitis ([facility] [address]     Dr. Elie Denis    Colon polyp 08/30/2016    Depression     seeing Sudha Tapia    Esophageal abnormality     nodule     Fatty liver disease, nonalcoholic     U/S 66/0374 [facility]    Follicular adenoma of thyroid gland 01/15/2021    s/p lobectomy    Furuncle     legs    History of blood transfusion     History of Doppler ultrasound 05/29/2011    No hemodynamically significant carotid stenosis is identified. A thyroid nodule on each side. Dedicated ultrasound of thyroid gland is suggested to further evaluate if clinically indicated.      History of pulmonary embolism 2007    s/p GFF, related to knee surgery    Hx of blood clots     leg and lung    Hyperlipidemia     severely elevated triglycerides    Hypertension     diastolic    Intracranial arachnoid cyst 11/2012     Ruiz drained, complicated with seizures, DKA    Irritable bowel syndrome     Liver disease     Lowman Maida - elevated LFT - positive smooth muscle antibody, steatosis per liver bx 3/2014 with Dr. Areli Benitez (multiple drug resistant organisms) resistance 2012    MRSA (methicillin resistant staph aureus) culture positive     h/o in foot and before brain surgery    Nephrolithiasis     noted on CT abdomen 9/2016, 6/2019    Neuropathy     Pancreatic insufficiency     Positive SANDY (antinuclear antibody)     Dr. Ken Eason - first visit in 6/2013    Prolonged emergence from general anesthesia     Restless legs syndrome     Seizures ([address]     started with brain surgery    Sinus tachycardia     Holter 3/2013 - seeing Dr. Vikas Avila    Skull fracture [facility])     clips     Sleep apnea     positive sleep apnea per study 10/2020, s/p UPPP surgery 2021    Type II or unspecified type diabetes mellitus without mention of complication, not stated as uncontrolled 2007       Past Surgical History:   Procedure Laterality Date    CARDIAC CATHETERIZATION      2011,5-6 years ago    CARDIAC CATHETERIZATION  03/2012    CARDIAC CATHETERIZATION  04/28/2016    [address] RELEASE  01/2017    CHOLECYSTECTOMY  2004    COLONOSCOPY  2012    COLONOSCOPY  08/2016    2 tubular adenomas - reportedly needs repeat scope in 6 months    COLONOSCOPY N/A 11/1/2022    COLONOSCOPY POLYPECTOMY SNARE/COLD BIOPSY performed by Fina Romero MD at [address] [facility]  11/2012    arachnoid cyst drainage    EKG 12-LEAD  10/18/2015         ENDOSCOPY, COLON, DIAGNOSTIC      FOOT SURGERY  05/20/2022    right foot metatarsal phalangeal joint resection and placement of antibiotic beads, left foot Achilles tendon lengthening    HERNIA REPAIR Right 1996    [address] Right 09/15/2016    Robotic assisted    KNEE ARTHROSCOPY Right 2016    KNEE SURGERY Left 2007    acl and debrided twice    OTHER SURGICAL HISTORY  05/31/2011 Tilt table was associated w/ nonspecific symptoms or nausea, otherwise no significant dizziness or syncope. No significant hemodynamic changes. Otherwise, unremarkable tilt table test after 30 minutes of tilting. OTHER SURGICAL HISTORY  01/20/2017    RIGHT SHOULDER ARTHROSCOPY, OPEN STAN, OPEN ACROMIOPLASTY, BICEP TENDESIS, RIGHT CARPAL TUNNEL RELEASE    SHOULDER SURGERY Right 01/2007    THYROID LOBECTOMY N/A 01/15/2021    THYROID LOBECTOMY, UVULOPALATOPHARYNGOPLAST LAOP performed by Corby Gorman MD at [address] [place] [address] ECHOCARDIOGRAM  03/05/2011    LV size and systolic function normal. EF 55-65%. UPPER GASTROINTESTINAL ENDOSCOPY  2012    UPPER GASTROINTESTINAL ENDOSCOPY  2016    Dr. Normand Osler Left 10/22/2019    EGD DILATION SAVORY performed by Nick Oakes MD at [address] 10/22/2019    EGD BIOPSY performed by Nick Oakes MD at [place] [address] 11/16/2021    EGD performed by Nick Oakes MD at [address] 11/16/2021    EGD BIOPSY performed by Nick Oakes MD at [address] [place] [address]  2007       Current Outpatient Medications   Medication Sig Dispense Refill    lisinopril (PRINIVIL;ZESTRIL) 5 MG tablet Take 1 tablet by mouth daily 90 tablet 1    metoprolol tartrate (LOPRESSOR) 25 MG tablet Take 1 tablet by mouth 2 times daily 180 tablet 1    vitamin D (ERGOCALCIFEROL) 1.25 MG (80293 UT) CAPS capsule Take 1 capsule by mouth once a week 12 capsule 1    gabapentin (NEURONTIN) 600 MG tablet Take 1 tablet by mouth 4 times daily for 90 days. 120 tablet 2    ONE TOUCH ULTRASOFT LANCETS MISC Use to test blood sugars 3 times daily DX:E11.40 Patient is on insulin .  100 each 5    blood glucose test strips (ONETOUCH ULTRA) strip Test blood sugars 3 times daily DXE11.40 Patient is on insulin . 100 each 5    levETIRAcetam (KEPPRA) 1000 MG tablet Take 2 tablets by mouth 2 times daily 120 tablet 3    insulin glargine (BASAGLAR KWIKPEN) 100 UNIT/ML injection pen Inject 120 Units into the skin 2 times daily 5 pen 2    Dulaglutide 0.75 MG/0.5ML SOPN Inject 0.75 mg into the skin once a week (Patient taking differently: Inject 1.5 mg into the skin once a week) 4 pen 0    ARIPiprazole (ABILIFY) 10 MG tablet Take 10 mg by mouth daily Dose reduced by psychiatry 8/30/22      promethazine (PHENERGAN) 12.5 MG tablet Take 1-2 tablets by mouth every 8 hours as needed for Nausea 60 tablet 1    aspirin 81 MG chewable tablet Take 1 tablet by mouth daily 30 tablet 0    Insulin Pen Needle 32G X 4 MM MISC Use 5 times daily with insulin pens 450 each 3    sucralfate (CARAFATE) 1 GM tablet Take 1 g by mouth 4 times daily      DULoxetine (CYMBALTA) 60 MG extended release capsule Take 120 mg by mouth daily      insulin lispro (HUMALOG) 100 UNIT/ML injection vial Takes 40 units with meals plus personal sliding scale      pantoprazole (PROTONIX) 40 MG tablet Take 1 tablet by mouth 2 times daily 180 tablet 1    rOPINIRole (REQUIP) 1 MG tablet Take 1.5 tablets by mouth 3 times daily Take 1 tablet by mouth 3 times daily 405 tablet 0    lacosamide (VIMPAT) 200 MG tablet Take 200 mg by mouth 2 times daily in addition to 50mg twice daily totaling 250mg twice daily. 60 tablet 2    lacosamide (VIMPAT) 50 MG TABS tablet Take 50mg dose along with 200mg twice daily totaling 250mg twice daily. (Patient not taking: Reported on 1/24/2023) 60 tablet 2     No current facility-administered medications for this visit.        Allergies   Allergen Reactions    Ciprofloxacin-Ciproflox Hcl Er Other (See Comments)     Elevated creatinine    Heparin      Monitor for thrombocytopenia    Metformin Nausea Only     Abdominal pain, diarrhea    Niacin And Related Other (See Comments)     Burning sensation, severe flushing    Tramadol Hcl      Contraindication to seizure medications    Ultram [Tramadol]      Contraindication to seizure medications    Warfarin      Very difficult to regulate in past    Other      Other reaction(s): very difficult to regulate    Tylenol [Acetaminophen] Other (See Comments)     Interferes with CGM        Social History     Socioeconomic History    Marital status:      Spouse name: Not on file    Number of children: 3    Years of education: Not on file    Highest education level: Not on file   Occupational History    Occupation: Disability since 2011   Tobacco Use    Smoking status: Never    Smokeless tobacco: Never   Vaping Use    Vaping Use: Never used   Substance and Sexual Activity    Alcohol use: No    Drug use: No    Sexual activity: Yes     Partners: Female   Other Topics Concern    Not on file   Social History Narrative    Not on file     Social Determinants of Health     Financial Resource Strain: Low Risk     Difficulty of Paying Living Expenses: Not hard at all   Food Insecurity: No Food Insecurity    Worried About Running Out of Food in the Last Year: Never true    [address] in the Last Year: Never true   Transportation Needs: No Transportation Needs    Lack of Transportation (Medical): No    Lack of Transportation (Non-Medical):  No   Physical Activity: Inactive    Days of Exercise per Week: 0 days    Minutes of Exercise per Session: 0 min   Stress: Stress Concern Present    Feeling of Stress : Very much   Social Connections: Unknown    Frequency of Communication with Friends and Family: More than three times a week    Frequency of Social Gatherings with Friends and Family: Not on file    Attends [employer] Services: Not on file    Active Member of Clubs or Organizations: No    Attends Club or Organization Meetings: Never    Marital Status:    Intimate Partner Violence: Not on file   Housing Stability: [address] to Pay for Housing in the Last Year: No    Number of [place] in the Last Year: 1 Unstable Housing in the Last Year: No       Review of Systems - General ROS: negative for - chills or fever  Psychological ROS: negative for - anxiety and depression - missed last appt with psych had seizure that day. Respiratory ROS: no cough, shortness of breath, or wheezing  Cardiovascular ROS: no chest pain or dyspnea on exertion, limited activity  Gastrointestinal ROS:positive right sided abdominal pain, and change in bowel habits, no black or bloody stools  Genito-Urinary ROS: no dysuria, trouble voiding, or hematuria  Musculoskeletal ROS: negative for - muscle pain or muscular weakness, bilateral foot pain  Neurological ROS: negative for - headaches, or weakness -chronic intermittent seizures   Dermatological ROS: negative for - skin lesion changes    Blood pressure 104/70, pulse 92, temperature 98 °F (36.7 °C), height 6' 2\" (1.88 m), weight 279 lb (126.6 kg). Physical Examination: General appearance -alert, well appearing, and in no distress  Mouth - oral pharynx clear, mucous membranes moist, poor dentition  Neck - supple, no significant adenopathy  Chest - clear to auscultation, no wheezes, rales or rhonchi, symmetric air entry  Heart - normal rate, regular rhythm, normal S1, S2, no murmurs, rubs, clicks or gallops  Abdomen -soft, nontender, nondistended  Neurological - alert, normal speech  Extremities - no clubbing or cyanosis. Skin - warm and dry    Diagnostic data:   I have reviewed recent diagnostic testing including labs- HgA1c 7.2 9/19/22, 8.7 1/16/23, colonoscopy path report - 11/1/22 - tubular adenomas. CT left ankle/foot 9/6/22:  Impression   Erosive destructive changes are noted in the tarsal bones of the mid foot and the metatarsal bases. It is difficult to assess whether there are also fractures of the base of the metatarsals (particularly the second metatarsal) versus periarticular    calcification, or organization, and destructive changes changes of Charcot joint.  The mid and distal metatarsal diaphyses as well as the metatarsal heads and phalanges appear intact. The talar dome appears intact and the ankle mortise alignment is    preserved. MRI lumbar spine 11/8/22  IMPRESSION:     No evidence for acute fracture or other significant bony pathology. Multilevel disc degeneration and mild facet arthropathy. Small right foraminal disc protrusion producing mild right neuroforaminal     stenosis at L5-S1       Assessment and Plan:      Diagnosis Orders   1. Type 2 diabetes mellitus with hyperglycemia, with long-term current use of insulin ([facility])  Basic Metabolic Panel    Microalbumin / Creatinine Urine Ratio      2. Type 2 diabetes mellitus with diabetic neuropathy, with long-term current use of insulin ([facility])  POCT glycosylated hemoglobin (Hb A1C)    88397 - Collection Capillary Blood Specimen    gabapentin (NEURONTIN) 600 MG tablet    Basic Metabolic Panel    Lipid Panel    Microalbumin / Creatinine Urine Ratio      3. Primary hypertension  lisinopril (PRINIVIL;ZESTRIL) 5 MG tablet    metoprolol tartrate (LOPRESSOR) 25 MG tablet    Basic Metabolic Panel      4. Mixed hyperlipidemia        5. Vitamin D deficiency  vitamin D (ERGOCALCIFEROL) 1.25 MG (67370 UT) CAPS capsule    Vitamin D 25 Hydroxy      6. Diarrhea, unspecified type  Clostridium Difficile Toxin/Antigen    CBC with Auto Differential      7. Right sided abdominal pain  CBC with Auto Differential      8. Hypomagnesemia  Magnesium      9. S/P partial thyroidectomy  TSH With Reflex Ft4      10. Obesity (BMI 30-39. 9)          DM - HgA1c 8.7 today 1/2023, not controlled but due to dietary non-compliance, complicated by GI issues, on Basaglar and Humalog as above - will continue. Stressed the importance of eating 3 small meals a day instead of one big meal, he needs to get back into habit of Glucerna powder drink for breakfast (20 gm protein) to help with this. Give up pop.   He is working with GI to help with chronic nausea/abdominal discomfort/fatty liver issues, see planned work up above. He was frustrated with Dexcom falling off due to sweating, even with overlay. I do think he was doing better when he was wearing it (HgA1c 7.2 9/2022). Encouraged him to think about restarting it, work with DM clinic - but he refused. Due to GI issues now he is not eating regularly. Hypertension/history of tachycardia - just started lisinopril at last visit - controlled on metoprolol and lisinopril, will continue. Hyperlipidemia - previously on fenofibrate and Lipitor but stopped after hospitalization for thyroid surgery/post-op confusion. Repeat LDL and triglyceride at goal - 4/2021, so did not restart statin. But in light of DM plus atherosclerosis or aorta on CT lumbar spine - consider restarting statin. Due for lipid panel now - may help guide dose of Lipitor. Vitamin D deficiency - no recent levels but last one 18 and has gotten intermittent scripts for vitamin D 50,000 IU weekly - will refill and get level now. I suspect he has not been consistent with supplementation and level will still be low. Diarrhea/abdominal pain/Chronic N/V/GERD - worse - check CBC and C.diff if watery stool. Continue Protonix 40 mg BID and restart Carafate QID. He has prn Phenergan. Try to start fiber supplement daily to see if will give stool more form. Try probiotic or yogurt to balance gut kenya. Continue to encourage regular follow up with GI which he doesn't do consistently - liver testing pending. Hypomagnesemia - he has a history of this which could be exacerbated by PPI - will check level to see if need supplementation. Benign follicular adenoma/ s/p partial left thyroidectomy 1/2022 - TSH normal 7/2021. Due for TSH now to see if could contribute to GI/psych issues. Obesity - BMI 35.18- recommend weight loss with better diet but unable to exercise with foot issues.     Seizure disorder/episodic confusion - follows with neurologist in [place] now - Dr. Branham. He has been on maximal medications for years with breakthrough seizures. He has been able to come off Depakote. Still having seizures periodically - worse over the holidays. He is on Neurontin 600 mg QID, Vimpat 200 mg BID- to increase to 250 mg BID, and Keppra 2 g BID. He has Ativan to use prn if feels seizure coming on. Plan per neurology. Of note, [place]u was taken off list 5/16/22. Restless leg syndrome/tremor - stable, on Requip 1.5 mg TID, will continue per Neurology. History of multiple DVTs/PE - have been provoked. He was on Pradaxa with no excessive bleeding or CVA symptoms. Pradaxa too expensive, can't get through company and can't qualify for Medicaid, unable to control Coumadin with previous attempt reportedly with Dr. Aide Montanez (I was not his MD at that time). Patient doesn't want to continue medication as too expensive. He is aware that he is at high risk for another clot due to immobility. May need to explore other companies for anticoagulation through [facility]. Unfortunately he has not be prompt in past at giving information to [employer] to get help through [facility] so this may be difficult. Follow up visit 8 weeks to continue to work on DM control, discuss lipid meds, etc.      Nic Cleaning MD    [address] [place] [address] W.  [address]  Dept: [phone]  Dept Fax: [phone] : [phone]

## 2023-01-17 DIAGNOSIS — G40.909 SEIZURE DISORDER (HCC): ICD-10-CM

## 2023-01-17 RX ORDER — LACOSAMIDE 200 MG/1
TABLET ORAL
Qty: 60 TABLET | Refills: 2 | Status: SHIPPED | OUTPATIENT
Start: 2023-01-17 | End: 2023-04-17

## 2023-01-17 RX ORDER — LACOSAMIDE 50 MG/1
TABLET ORAL
Qty: 60 TABLET | Refills: 2 | Status: SHIPPED | OUTPATIENT
Start: 2023-01-17 | End: 2023-04-17

## 2023-01-17 NOTE — TELEPHONE ENCOUNTER
Received a fax from [facility] by Arabella Houser stating that they don't carry or dispense the Lacosamide 50 mg or 200 mg. Call placed to Mr. Doron Delacruz and a message left on his VM asking for a return call. Patient called back a few minutes later and this was discussed. Patient stated that he gets the medication through [facility]. Patient informed that a new Rx for both the 50 mg and 200 mg would be sent to Dr. John Everett to approve and send to the new pharmacy. Patient verbally stated his understanding.

## 2023-01-24 ENCOUNTER — OFFICE VISIT (OUTPATIENT)
Dept: NEUROLOGY | Age: 53
End: 2023-01-24
Payer: MEDICARE

## 2023-01-24 VITALS
HEIGHT: 74 IN | BODY MASS INDEX: 35.16 KG/M2 | SYSTOLIC BLOOD PRESSURE: 126 MMHG | DIASTOLIC BLOOD PRESSURE: 79 MMHG | WEIGHT: 274 LBS

## 2023-01-24 DIAGNOSIS — E66.01 SEVERE OBESITY (BMI 35.0-39.9) WITH COMORBIDITY (HCC): ICD-10-CM

## 2023-01-24 DIAGNOSIS — G25.81 RESTLESS LEGS SYNDROME (RLS): ICD-10-CM

## 2023-01-24 DIAGNOSIS — G40.909 SEIZURE DISORDER (HCC): ICD-10-CM

## 2023-01-24 PROCEDURE — 1036F TOBACCO NON-USER: CPT | Performed by: PSYCHIATRY & NEUROLOGY

## 2023-01-24 PROCEDURE — 99214 OFFICE O/P EST MOD 30 MIN: CPT | Performed by: PSYCHIATRY & NEUROLOGY

## 2023-01-24 PROCEDURE — 3017F COLORECTAL CA SCREEN DOC REV: CPT | Performed by: PSYCHIATRY & NEUROLOGY

## 2023-01-24 PROCEDURE — G8417 CALC BMI ABV UP PARAM F/U: HCPCS | Performed by: PSYCHIATRY & NEUROLOGY

## 2023-01-24 PROCEDURE — 3078F DIAST BP <80 MM HG: CPT | Performed by: PSYCHIATRY & NEUROLOGY

## 2023-01-24 PROCEDURE — 3074F SYST BP LT 130 MM HG: CPT | Performed by: PSYCHIATRY & NEUROLOGY

## 2023-01-24 PROCEDURE — G8427 DOCREV CUR MEDS BY ELIG CLIN: HCPCS | Performed by: PSYCHIATRY & NEUROLOGY

## 2023-01-24 PROCEDURE — G8484 FLU IMMUNIZE NO ADMIN: HCPCS | Performed by: PSYCHIATRY & NEUROLOGY

## 2023-01-24 RX ORDER — ROPINIROLE 1 MG/1
1.5 TABLET, FILM COATED ORAL 3 TIMES DAILY
Qty: 405 TABLET | Refills: 0 | Status: SHIPPED | OUTPATIENT
Start: 2023-01-24 | End: 2023-04-24

## 2023-01-24 NOTE — PROGRESS NOTES
[address]  [address], [address] [place] [address]  Ph: [phone] or [phone]  FAX: [phone]    Chief Complaint: seizures     Dear Juan Carlos Guerra MD     As you would recall Dillon Byers is a 46 y.o. male. Patient presented to [facility] with my 4/7/22. He was accompanied by his wife. Patient had an arachnoid cyst and has history of right-sided neurosurgery. Patient also has history of seizures, RUE tremor, and recently has a possible focal seizure, for which an EEG was ordered. He recently discontinued Depakote. He had been on Depakote for 5-6 years. Patient's seizures vary. He reports that he does not remember most seizures. Patient admits to aura. Patient explains that his head goes numb and tingles on the left side. Sometimes he experiences eye drooping. He also loses coordination and cannot walk. He states that he falls down during seizures sometimes. Patient typically lays down when he feels an aura, and becomes confused and has hallucinations. His wife reports that the altered mental status will last for up to 6 hours. The patient reports that he also has shorter seizures that last approximately 10 minutes. Patient wife reports he has only had 2 grand mal seizures. His first grand mal seizure was after his brain surgery, and the second one occurred approximately 10 years ago. The patient's wife also reports that he sometimes had seizures wherein his eyes would roll and he would vomit. Patient admitted to incontinence during a few of these episodes as well. Among seizures, patient also complains of episodic tremors. Since discontinuing Depakote, patient's tremors have decreased in frequency. Patient had encephalopathy in July 2021. Patient's wife states he is still recovering, as he is not as functional as he used to be. Patient has stopped driving since.      Type A seizures: numbness/tingling, duration, lack of awareness  Type B: grand mal    Today, the patient is accompanied by his wife. The patient has made multiple telephones calls to the clinic since the last visit to reports breakthrough seizures. Patient has had breakthrough seizures in September, December and January. These seizures were described as being Type A seizures. He is aware that the event is happening with no LOC occurring. Patient reports that these episodes last 10 minutes, which is shorter than he typical seizure duration. He is unable to tell the difference between his epileptic and nonepileptic seizures. Patient was unable to increase Vimpat from 200 mg BID to 250 mg BID. Patient reports that he has observed an onset of intermittent tremor in his right upper extremity. He denies any specific provoking factors. Patient admits to ambulating without a cane. Neurological workup:  EEG 7/28/21: Severely abnormal record with evidence of bihemispheric dysfunction no active seizures recorded but clear-cut ictal propensity recording consistent with resolved status head dominant focus appears to be residual left frontal area  EEG 12/2/21: During this day of recording no events were recorded. The interictal EEG was abnormal due to breach rhythm over the right frontal region suggesting underlying skull defect. Occasional polymorphic theta slowing over the left frontal region suggested underlying neuronal dysfunction. Occasional left posterior temporal sharp waves conferred an increased risk for focal onset seizures. Monitoring was continued in order to record the patient's typical events. The EKG channel revealed no abnormalities.   CT Head WO Contrast 11/12/21: unremarkable      Current prophylactic medication Dosage   Keppra 2000 mg BID   Vimpat 200 BID   Gabapentin 600 QID         Previous prophylactic medications Reason for discontinuation   Depakote                            Past Medical History:   Diagnosis Date    Abnormal thyroid biopsy     s/p left hemithyroidectomy 5/9448 -benign follicular adenoma    Acid reflux     Anemia     resolved - previously seen by Dr. Katie Martinez (due to enlarged spleen)    Anesthesia     seizures with brain surgery due to blood sugar got really high    Bile reflux gastritis     per EGD 11/16/21 - Dr. Doni Dale - to start Carafate. Bipolar disorder ([facility]     Blood clot in vein 04/07/2016    Reanna Block     Chest pain     previously seeing [facility] cardiologist, now seeing Dr. Jennifer Hamlin (LAD bridging on cath 4/2016)    Chronic back pain     Chronic bronchitis ([facility]     Dr. Keshawn Mercedes polyp 08/30/2016    Depression     seeing BREANN    DVT (deep venous thrombosis) ([facility] [id number]    not on Coumadin due to unable to regulate - Dr. Eddie Walls - no etiology found per patient    Esophageal abnormality     nodule     Fatty liver disease, nonalcoholic     U/S 88/[facility]    Follicular adenoma of thyroid gland 01/15/2021    s/p lobectomy    Furuncle     legs    History of blood transfusion     History of Doppler ultrasound 05/29/2011    No hemodynamically significant carotid stenosis is identified. A thyroid nodule on each side. Dedicated ultrasound of thyroid gland is suggested to further evaluate if clinically indicated.      History of pulmonary embolism 2007    s/p GFF, related to knee surgery    Hx of blood clots     leg and lung    Hyperlipidemia     severely elevated triglycerides    Hypertension     diastolic    Intracranial arachnoid cyst 11/2012    Dr. Clemetine Claude drained, complicated with seizures, DKA    Irritable bowel syndrome     Liver disease     German Jenny - elevated LFT - positive smooth muscle antibody, steatosis per liver bx 3/2014 with Dr. Avelino Shabazz (multiple drug resistant organisms) resistance 2012    MRSA (methicillin resistant staph aureus) culture positive     h/o in foot and before brain surgery    Nephrolithiasis     noted on CT abdomen 9/2016, 6/2019    Neuropathy     Pancreatic insufficiency     Positive SANDY (antinuclear antibody) Dr. Ian Saleh - first visit in 6/2013    Prolonged emergence from general anesthesia     Restless legs syndrome     Seizures ([address]     started with brain surgery    Sinus tachycardia     Holter 3/2013 - seeing Dr. Tyrone Mccoy    Skull fracture [facility])     clips     Sleep apnea     positive sleep apnea per study 10/2020. Systolic dysfunction     \"systolic bridge\"  EF 74% ECHO 9/2019    Type II or unspecified type diabetes mellitus without mention of complication, not stated as uncontrolled 2007     Past Surgical History:   Procedure Laterality Date    CARDIAC CATHETERIZATION      2011,5-6 years ago    CARDIAC CATHETERIZATION  03/2012    CARDIAC CATHETERIZATION  04/28/2016    [address] [facility] [address] RELEASE  01/2017    CHOLECYSTECTOMY  2004    COLONOSCOPY  2012    COLONOSCOPY  08/2016    2 tubular adenomas - reportedly needs repeat scope in 6 months    COLONOSCOPY N/A 11/1/2022    COLONOSCOPY POLYPECTOMY SNARE/COLD BIOPSY performed by Allison Saldana MD at [facility]  11/2012    arachnoid cyst drainage    EKG 12-LEAD  10/18/2015         ENDOSCOPY, COLON, DIAGNOSTIC      FOOT SURGERY  05/20/2022    right foot metatarsal phalangeal joint resection and placement of antibiotic beads, left foot Achilles tendon lengthening    HERNIA REPAIR Right 1996    [facility] Right 09/15/2016    Robotic assisted    KNEE ARTHROSCOPY Right 2016    KNEE SURGERY Left 2007    acl and debrided twice    OTHER SURGICAL HISTORY  05/31/2011    Tilt table was associated w/ nonspecific symptoms or nausea, otherwise no significant dizziness or syncope. No significant hemodynamic changes. Otherwise, unremarkable tilt table test after 30 minutes of tilting.      OTHER SURGICAL HISTORY  01/20/2017    RIGHT SHOULDER ARTHROSCOPY, OPEN STAN, OPEN ACROMIOPLASTY, BICEP TENDESIS, RIGHT CARPAL TUNNEL RELEASE    SHOULDER SURGERY Right 01/2007    THYROID LOBECTOMY N/A 01/15/2021    THYROID LOBECTOMY, UVULOPALATOPHARYNGOPLAST LAOP performed by Shay Feldman MD at [address] ECHOCARDIOGRAM  03/05/2011    LV size and systolic function normal. EF 55-65%.      UPPER GASTROINTESTINAL ENDOSCOPY  2012    UPPER GASTROINTESTINAL ENDOSCOPY  2016    Dr. Edith Hernandes Left 10/22/2019    EGD DILATION SAVORY performed by Adeola Leal MD at [address] ENDOSCOPY Left 10/22/2019    EGD BIOPSY performed by Adeola Leal MD at [place] [address] N/A 11/16/2021    EGD performed by Adeola Leal MD at [place] [address] Left 11/16/2021    [facility]  [facility]     Allergies   Allergen Reactions    Ciprofloxacin-Ciproflox Hcl Er Other (See Comments)     Elevated creatinine    Heparin      Monitor for thrombocytopenia    Metformin Nausea Only     Abdominal pain, diarrhea    Niacin And Related Other (See Comments)     Burning sensation, severe flushing    Tramadol Hcl      Contraindication to seizure medications    Ultram [Tramadol]      Contraindication to seizure medications    Warfarin      Very difficult to regulate in past    Other      Other reaction(s): very difficult to regulate    Tylenol [Acetaminophen] Other (See Comments)     Interferes with CGM      Family History   Problem Relation Age of Onset    Heart Disease Father 61        MI    Stroke Brother     Obesity Brother     Arthritis Mother     Seizures Mother     Obesity Sister     Other Brother         pulmonary embolism    Diabetes Daughter     Seizures Brother       Social History     Socioeconomic History    Marital status:      Spouse name: Not on file    Number of children: 3    Years of education: Not on file    Highest education level: Not on file   Occupational History    Occupation: Disability since 2011   Tobacco Use Smoking status: Never    Smokeless tobacco: Never   Vaping Use    Vaping Use: Never used   Substance and Sexual Activity    Alcohol use: No     Alcohol/week: 0.0 standard drinks    Drug use: No    Sexual activity: Not on file   Other Topics Concern    Not on file   Social History Narrative    Not on file     Social Determinants of Health     Financial Resource Strain: Low Risk     Difficulty of Paying Living Expenses: Not hard at all   Food Insecurity: No Food Insecurity    Worried About [address] in the Last Year: Never true    [address] [place] in the Last Year: Never true   Transportation Needs: No Transportation Needs    Lack of Transportation (Medical): No    Lack of Transportation (Non-Medical): No   Physical Activity: Inactive    Days of Exercise per Week: 0 days    Minutes of Exercise per Session: 0 min   Stress: Stress Concern Present    Feeling of Stress : Very much   Social Connections: Unknown    Frequency of Communication with Friends and Family: More than three times a week    Frequency of Social Gatherings with Friends and Family: Not on file    Attends Congregational Services: Not on file    Active Member of Clubs or Organizations: No    Attends Club or Organization Meetings: Never    Marital Status:    Intimate Partner Violence: Not on file   Housing Stability: [address] to Pay for Housing in the Last Year: No    Number of [place] in the Last Year: 1    Unstable Housing in the Last Year: No      There were no vitals taken for this visit.      HEENT [] Hearing Loss  [] Visual Disturbance  [] Tinnitus  [] Eye pain   Respiratory [] Shortness of Breath  [] Cough  [] Snoring   Cardiovascular [] Chest Pain  [] Palpitations  [] Lightheaded   GI [] Constipation  [] Diarrhea  [] Swallowing change  [] Nausea/vomiting    [] Urinary Frequency  [] Urinary Urgency   Musculoskeletal [] Neck pain  [] Back pain  [] Muscle pain  [] Restless legs   Dermatologic [] Skin changes   Neurologic [] Memory loss/confusion  [x] Seizures  [] Trouble walking or imbalance  [] Dizziness  [] Sleep disturbance  [] Weakness  [] Numbness  [x] Tremors  [] Speech Difficulty  [] Headaches  [] Light Sensitivity  [] Sound Sensitivity   Endocrinology []Excessive thirst  []Excessive hunger   Psychiatric [] Anxiety/Depression  [] Hallucination   Allergy/immunology []Hives/environmental allergies   Hematologic/lymph [] Abnormal bleeding  [] Abnormal bruising     General examination:    Head: Normocephalic, atraumatic  Eyes: Extraocular movements intact  Lungs: Respirations unlabored, chest wall no deformity  ENT: Normal external ear canals, no sinus tenderness  Heart: Regular rate rhythm  Abdomen: No masses, tenderness  Extremities: No cyanosis or edema, 2+ pulses  Skin: Intact, normal skin color    Neurological examination:  Mental status   Alert and oriented; intact memory with no confusion, speech or language problems; no hallucinations or delusions     Cranial nerves   II - visual fields intact to confrontation                                                III, IV, VI - extra-ocular muscles full: no pupillary defect; no KARIS, no nystagmus, no ptosis   V - normal facial sensation                                                               VII - normal facial symmetry                                                             VIII - intact hearing                                                                             IX, X - symmetrical palate                                                                  XI - symmetrical shoulder shrug                                                       XII - midline tongue without atrophy or fasciculation     Motor function  Normal muscle bulk and tone; normal power 5/5, including fine motor movements     Sensory function Intact to touch, pin, vibration, proprioception     Cerebellar Intact fine motor movement.  No involuntary movements or tremors     Reflex function Intact 2+ DTR and symmetric. Negative Babinski     Gait                  Normal station and gait       Assessment Recommendations:    Seizure disorder  The patient has a history of right frontal arachnoid cyst status postresection. Previous EEG recordings have shown right frontal, bifrontal and left posterior temporal sharp waves. Additionally, recording show the patient does experience nonepileptic events. Since the last visit, he has experienced 3 breakthrough seizures, all consistent with his Type A seizures (numbness/tingling, lack of awareness, eyes rolling to the back of his head). I recommend he increase Vimpat from 200 mg BID to 250 mg BID for improved seizure prophylaxis. Patient to continue Keppra 2000 mg BID and Neurontin 600 mg QID. If the patient's seizures are unable to be controlled with this medication adjustment, I discussed the possibility of repeat EEG monitoring to further differentiate between his epileptic and nonepileptic events. Intermittent resting tremor in the right upper extremity    Upon examination, the patient displayed an intermittent resting tremor which was exacerbated while walking. This could be a possible side effect from Abilify, and I advised him to consult his psychiatrist to discuss this. For now, I recommend he increase Requip to 1.5 mg TID for tremor management. If symptoms do not improve, the plan will be to pursue a DaTscan to identify possible Parkinson's disease. I advised him to call me in the next week to discuss status of symptoms. All medication side effects were discussed and questions answered. Patient to follow up in 3-4 months or sooner if symptoms worsen.     Scribe Attestation:   By signing my name below, Noe Cameron, attest that this documentation has been prepared under the direction and in the presence of Isiah Hamilton MD.    Electronically Signed: Jovani Mauricio. 01/24/23. 12:44 PM

## 2023-01-26 ENCOUNTER — TELEPHONE (OUTPATIENT)
Dept: NEUROLOGY | Age: 53
End: 2023-01-26

## 2023-01-26 NOTE — TELEPHONE ENCOUNTER
Hector called in stating that he had a seizure yesterday. He said that he had some head numbness and eye rolling during the seizure. He also stated that he lost his balance. Patient is taking Keppra 1000 mg two tablets BID and Vimpat 200 mg BID. Patient was suppose to start taking Vimpat 250 BID but he hasn't received his prescription yet. He denied of any ER visits and injuries. Yazmin Lora said that he is having some head numbness as of today. He was seen in office Tuesday 1/24/23.  Can you please advise

## 2023-01-30 NOTE — TELEPHONE ENCOUNTER
I would add vimpat as suggested and would rec patient for LTM admission to capture these events with medications titration down during hospital stay.  Thanks

## 2023-01-31 NOTE — TELEPHONE ENCOUNTER
I called Hector today. He has not started the increased dose of Vimpat because he has been getting the 200 mg. dose through the  (pt. assistance type program) which his doctor in [place] had done the paperwork for. He has not had any further seizures either. Do you want to bring him in for LTM still or please advise. To add the additional 50 mg. of Vimpat we may have to contact the assistance program to update.

## 2023-02-01 NOTE — TELEPHONE ENCOUNTER
I would add 50 mg vimpat to make it 250 mg BID. Ok to hold off on ltm unless he has more seizures. Thanks

## 2023-02-02 NOTE — TELEPHONE ENCOUNTER
I called [facility] the pharmacy that we sent RX to. I spoke with the pharmacist. They have his RX for the 200 mg. dose and the 50 mg. dose but he needs to call them to arrange shipment because he has to sign for it. They said they last shipped him Vimpat 200 mg. on 8/22 for 90 day supply. I called Hector. He said he had just received 2 bottles. He is currently in [facility] and is to have surgery tomorrow to remove a toe. He said he had advised the hospital he is to beo n 250 mg. bid. He said he will call me when he gets discharged.

## 2023-02-05 PROBLEM — R07.9 CHEST PAIN: Status: RESOLVED | Noted: 2022-06-13 | Resolved: 2023-02-05

## 2023-02-08 ENCOUNTER — TELEPHONE (OUTPATIENT)
Dept: INTERNAL MEDICINE CLINIC | Age: 53
End: 2023-02-08

## 2023-02-08 NOTE — TELEPHONE ENCOUNTER
Called patient about getting his labs done and he is currently inpatient at [facility] .

## 2023-02-08 NOTE — TELEPHONE ENCOUNTER
----- Message from Bhupinder Tobias MD sent at 2/5/2023  7:26 PM EST -----  Patient appears to have had labs at [facility] 1/30/22 - high lactic acid - suspect he was in ED or hospital - please ask for any ED notes, H and P, discharge summaries, etc.  He did not get my labs that I ordered 1/16- please tell him to do them now. Labs are fasting and need a urine with it. It looks like he is infected so I suspect he may be getting antibiotic? Yes- I still want a CBC and BMP even though they did one 1/30/23 (abnormal finding on them and want to re-check).

## 2023-02-16 ENCOUNTER — TELEPHONE (OUTPATIENT)
Dept: INTERNAL MEDICINE CLINIC | Age: 53
End: 2023-02-16

## 2023-02-16 NOTE — TELEPHONE ENCOUNTER
Care coordinator called saying Kerry Mireles is being discharged on Saturday . Gave verbal order that you will follow for home health and patient will need hospital follow-up scheduled within 7 days discharge .

## 2023-02-22 ENCOUNTER — OFFICE VISIT (OUTPATIENT)
Dept: INTERNAL MEDICINE CLINIC | Age: 53
End: 2023-02-22
Payer: MEDICARE

## 2023-02-22 VITALS
BODY MASS INDEX: 32.61 KG/M2 | WEIGHT: 254 LBS | OXYGEN SATURATION: 95 % | HEART RATE: 74 BPM | RESPIRATION RATE: 20 BRPM | DIASTOLIC BLOOD PRESSURE: 62 MMHG | TEMPERATURE: 97.7 F | SYSTOLIC BLOOD PRESSURE: 110 MMHG

## 2023-02-22 DIAGNOSIS — S98.111A AMPUTATION OF RIGHT GREAT TOE (HCC): ICD-10-CM

## 2023-02-22 DIAGNOSIS — Z79.4 TYPE 2 DIABETES MELLITUS WITH DIABETIC NEUROPATHY, WITH LONG-TERM CURRENT USE OF INSULIN (HCC): ICD-10-CM

## 2023-02-22 DIAGNOSIS — E11.40 TYPE 2 DIABETES MELLITUS WITH DIABETIC NEUROPATHY, WITH LONG-TERM CURRENT USE OF INSULIN (HCC): ICD-10-CM

## 2023-02-22 DIAGNOSIS — R35.0 URINARY FREQUENCY: Primary | ICD-10-CM

## 2023-02-22 PROCEDURE — 99213 OFFICE O/P EST LOW 20 MIN: CPT | Performed by: STUDENT IN AN ORGANIZED HEALTH CARE EDUCATION/TRAINING PROGRAM

## 2023-02-22 PROCEDURE — 2022F DILAT RTA XM EVC RTNOPTHY: CPT | Performed by: STUDENT IN AN ORGANIZED HEALTH CARE EDUCATION/TRAINING PROGRAM

## 2023-02-22 PROCEDURE — G8427 DOCREV CUR MEDS BY ELIG CLIN: HCPCS | Performed by: STUDENT IN AN ORGANIZED HEALTH CARE EDUCATION/TRAINING PROGRAM

## 2023-02-22 PROCEDURE — 3051F HG A1C>EQUAL 7.0%<8.0%: CPT | Performed by: STUDENT IN AN ORGANIZED HEALTH CARE EDUCATION/TRAINING PROGRAM

## 2023-02-22 PROCEDURE — G8417 CALC BMI ABV UP PARAM F/U: HCPCS | Performed by: STUDENT IN AN ORGANIZED HEALTH CARE EDUCATION/TRAINING PROGRAM

## 2023-02-22 PROCEDURE — 3017F COLORECTAL CA SCREEN DOC REV: CPT | Performed by: STUDENT IN AN ORGANIZED HEALTH CARE EDUCATION/TRAINING PROGRAM

## 2023-02-22 PROCEDURE — 3078F DIAST BP <80 MM HG: CPT | Performed by: STUDENT IN AN ORGANIZED HEALTH CARE EDUCATION/TRAINING PROGRAM

## 2023-02-22 PROCEDURE — 1036F TOBACCO NON-USER: CPT | Performed by: STUDENT IN AN ORGANIZED HEALTH CARE EDUCATION/TRAINING PROGRAM

## 2023-02-22 PROCEDURE — 3074F SYST BP LT 130 MM HG: CPT | Performed by: STUDENT IN AN ORGANIZED HEALTH CARE EDUCATION/TRAINING PROGRAM

## 2023-02-22 PROCEDURE — G8484 FLU IMMUNIZE NO ADMIN: HCPCS | Performed by: STUDENT IN AN ORGANIZED HEALTH CARE EDUCATION/TRAINING PROGRAM

## 2023-02-22 RX ORDER — HYDRALAZINE HYDROCHLORIDE 25 MG/1
25 TABLET, FILM COATED ORAL 2 TIMES DAILY
COMMUNITY
Start: 2023-02-16 | End: 2023-02-22 | Stop reason: ALTCHOICE

## 2023-02-22 RX ORDER — AMLODIPINE BESYLATE 10 MG/1
10 TABLET ORAL DAILY
COMMUNITY
Start: 2023-02-16

## 2023-02-22 RX ORDER — BENZONATATE 200 MG/1
200 CAPSULE ORAL 3 TIMES DAILY PRN
COMMUNITY
Start: 2023-02-16

## 2023-02-22 RX ORDER — ARIPIPRAZOLE 2 MG/1
2 TABLET ORAL DAILY
COMMUNITY
Start: 2023-01-26

## 2023-02-22 NOTE — PATIENT INSTRUCTIONS
GROUP 2    Blood Sugar Dose fast acting insulin    None   140-199 2 unit   200-249 4 units   250-299 6 units   300-349 8 units   350-399 10 units   400 and above 12 units

## 2023-02-22 NOTE — TELEPHONE ENCOUNTER
Agree - I am currently on vacation and patient has visit today in resident clinic to do hospital follow up visit.

## 2023-02-22 NOTE — PROGRESS NOTES
Florian Bah (:  1970) is a 46 y.o. male,Established patient, here for evaluation of the following chief complaint(s):  Follow-Up from Hospital (Osteomyelitis with amputation R hallux)         ASSESSMENT/PLAN:  1. Urinary frequency  -     Osmolality, Urine; Future  -     Culture, Urine  -     Creatinine, Random Urine; Future  -     Sodium, Urine, Random; Future  2. Type 2 diabetes mellitus with diabetic neuropathy, with long-term current use of insulin ([address]  3. Amputation of right great toe (HCC)    No follow-ups on file. Assessment/Plan:  Renal Failure -currently on HD (Monday, Friday). Hopefully this is temporary and patient regains kidney function. Discussed other renal replacement modalities such as peritoneal dialysis that can be better well-tolerated and less of an effect on her quality of life. Patient's BP running soft today (110/62). Concerned that patient's slightly low BP may be contributing to recent symptoms of fatigue and weakness postdialysis. Will discontinue hydralazine 25 mg twice daily. Diabetes mellitus -concerned about patient's early morning low blood sugars. We will have patient discontinue premeal insulin and continue with nightly glargine and sliding scale for now. Instructed patient to call office if he continues to have lows despite above change in regimen. Urinary complaints -  Patient's frequent urination may be a result of a recovering kidney function. Will obtain urine lytes and urine culture. Subjective   SUBJECTIVE/OBJECTIVE:  HPI    Mr. Ita West is a 46year old male with a hx of HTN, DM, HLD, RLS, depression and vitamin D deficiency who is here for follow-up visit. He was recently hospitalized at [facility]. He is s/p right great toe amputation (diabetic foot ulcer) which was complicated by sepsis. Patient developed acute kidney injury and ultimately required hemodialysis.   Since discharge he has been undergoing HD twice weekly (Monday and Friday). He follows with Dr. Emelyn Barclay. He states that his kidney function has not improved much and that he may require lifelong HD. Patient reports feeling weak and fatigued after each dialysis session. He states that it has negatively affected his quality of life and is unsure if he can continue like this forever. He also reports developing a catheter associated UTI while hospitalized. He states that he was given a very short course of antibiotics. He reports having to urinate very frequently and is concerned that his infection has not cleared completely. Patient denies dysuria, hematuria, suprapubic pain, fever, chills, nausea, and vomiting. Additionally, patient states that he has been getting low blood sugar alerts overnight via his Dexcom, ranging BS 50-60s. This has been happening multiple nights in a row, usually a few hours after going to bed. He reports getting symptomatic with his lows - symptoms include sweating, shakiness, and fatigue. He is currently on basal/bolus with a sliding scale. Patient has already dropped his basal insulin (Basaglar) from 120 units twice daily to 50 units nightly. Review of Systems   Constitutional:  Positive for fatigue. Negative for chills. HENT:  Negative for congestion, postnasal drip, sinus pain and sore throat. Eyes:  Negative for redness and visual disturbance. Respiratory:  Negative for cough, shortness of breath and wheezing. Cardiovascular:  Negative for chest pain, palpitations and leg swelling. Gastrointestinal:  Negative for abdominal pain, constipation, diarrhea and nausea. Endocrine: Negative for polydipsia and polyuria. Genitourinary:  Negative for dysuria, flank pain, frequency and hematuria. Musculoskeletal:  Negative for back pain and myalgias. Skin:  Negative for pallor and rash. Neurological:  Negative for seizures, light-headedness and headaches. Hematological:  Negative for adenopathy.  Does not bruise/bleed easily. Psychiatric/Behavioral:  Positive for dysphoric mood. Negative for confusion. The patient is not nervous/anxious. Objective   Physical Exam  Constitutional:       General: He is not in acute distress. Appearance: He is not ill-appearing or toxic-appearing. HENT:      Head: Normocephalic and atraumatic. Right Ear: External ear normal.      Left Ear: External ear normal.   Eyes:      General: No scleral icterus. Right eye: No discharge. Left eye: No discharge. Extraocular Movements: Extraocular movements intact. Conjunctiva/sclera: Conjunctivae normal.      Pupils: Pupils are equal, round, and reactive to light. Cardiovascular:      Rate and Rhythm: Normal rate and regular rhythm. Pulses: Normal pulses. Heart sounds: Normal heart sounds. No murmur heard. No gallop. Pulmonary:      Effort: Pulmonary effort is normal. No respiratory distress. Breath sounds: Normal breath sounds. No wheezing or rales. Abdominal:      General: Abdomen is flat. Bowel sounds are normal. There is no distension. Palpations: Abdomen is soft. Tenderness: There is no abdominal tenderness. There is no guarding. Musculoskeletal:         General: Normal range of motion. Cervical back: Normal range of motion and neck supple. No rigidity or tenderness. Feet:      Comments: S/p right great toe amputation  Lymphadenopathy:      Cervical: No cervical adenopathy. Skin:     General: Skin is warm and dry. Capillary Refill: Capillary refill takes less than 2 seconds. Neurological:      General: No focal deficit present. Mental Status: He is alert and oriented to person, place, and time. Psychiatric:         Behavior: Behavior normal.         Thought Content:  Thought content normal.          On this date 2/22/2023 I have spent 25 minutes reviewing previous notes, test results and face to face with the patient discussing the diagnosis and importance of compliance with the treatment plan as well as documenting on the day of the visit. An electronic signature was used to authenticate this note. --Hollie Bro MD   Internal Medicine Resident, PGY-3  Attending: Dr. Jessy Estevez. Aida Grissom.  [address]  Dept: [phone]  Dept Fax: 17 : [phone]

## 2023-02-24 ENCOUNTER — NURSE ONLY (OUTPATIENT)
Dept: LAB | Age: 53
End: 2023-02-24

## 2023-02-24 DIAGNOSIS — R35.0 URINARY FREQUENCY: ICD-10-CM

## 2023-02-24 LAB
CREAT UR-MCNC: 45.9 MG/DL
OSMOLALITY UR: 194 MOSMOL/KG (ref 250–750)
SODIUM UR-SCNC: 26 MEQ/L

## 2023-03-01 DIAGNOSIS — E11.40 TYPE 2 DIABETES MELLITUS WITH DIABETIC NEUROPATHY, WITH LONG-TERM CURRENT USE OF INSULIN (HCC): Primary | ICD-10-CM

## 2023-03-01 DIAGNOSIS — Z79.4 TYPE 2 DIABETES MELLITUS WITH DIABETIC NEUROPATHY, WITH LONG-TERM CURRENT USE OF INSULIN (HCC): Primary | ICD-10-CM

## 2023-03-07 PROBLEM — S98.111A AMPUTATION OF RIGHT GREAT TOE (HCC): Status: ACTIVE | Noted: 2023-03-07

## 2023-03-07 ASSESSMENT — ENCOUNTER SYMPTOMS
SHORTNESS OF BREATH: 0
SORE THROAT: 0
NAUSEA: 0
WHEEZING: 0
COUGH: 0
EYE REDNESS: 0
DIARRHEA: 0
CONSTIPATION: 0
SINUS PAIN: 0
BACK PAIN: 0
ABDOMINAL PAIN: 0

## 2023-04-25 ENCOUNTER — HOSPITAL ENCOUNTER (OUTPATIENT)
Dept: NEUROLOGY | Age: 53
Discharge: HOME | End: 2023-04-25

## 2023-04-25 ENCOUNTER — HOSPITAL ENCOUNTER (INPATIENT)
Age: 53
LOS: 3 days | Discharge: HOME | End: 2023-04-28
Attending: PSYCHIATRY & NEUROLOGY | Admitting: PSYCHIATRY & NEUROLOGY
Payer: MEDICARE

## 2023-04-25 DIAGNOSIS — E11.40 TYPE 2 DIABETES MELLITUS WITH DIABETIC NEUROPATHY, WITH LONG-TERM CURRENT USE OF INSULIN (HCC): ICD-10-CM

## 2023-04-25 DIAGNOSIS — Z79.4 TYPE 2 DIABETES MELLITUS WITH DIABETIC NEUROPATHY, WITH LONG-TERM CURRENT USE OF INSULIN (HCC): ICD-10-CM

## 2023-04-25 PROBLEM — G40.909 SEIZURE DISORDER (HCC): Status: ACTIVE | Noted: 2021-11-12

## 2023-04-25 PROBLEM — R56.9 SEIZURE (HCC): Status: ACTIVE | Noted: 2023-04-25

## 2023-04-25 LAB
GLUCOSE BLD-MCNC: 101 MG/DL (ref 75–110)
GLUCOSE BLD-MCNC: 90 MG/DL (ref 75–110)
GLUCOSE BLD-MCNC: 91 MG/DL (ref 75–110)

## 2023-04-25 PROCEDURE — 6360000002 HC RX W HCPCS

## 2023-04-25 PROCEDURE — 6370000000 HC RX 637 (ALT 250 FOR IP): Performed by: NURSE PRACTITIONER

## 2023-04-25 PROCEDURE — 2060000000 HC ICU INTERMEDIATE R&B

## 2023-04-25 PROCEDURE — 82947 ASSAY GLUCOSE BLOOD QUANT: CPT

## 2023-04-25 PROCEDURE — 2580000003 HC RX 258

## 2023-04-25 PROCEDURE — 95700 EEG CONT REC W/VID EEG TECH: CPT

## 2023-04-25 PROCEDURE — 99222 1ST HOSP IP/OBS MODERATE 55: CPT | Performed by: PSYCHIATRY & NEUROLOGY

## 2023-04-25 PROCEDURE — 95711 VEEG 2-12 HR UNMONITORED: CPT

## 2023-04-25 RX ORDER — ACETAMINOPHEN 650 MG/1
650 SUPPOSITORY RECTAL EVERY 6 HOURS PRN
Status: DISCONTINUED | OUTPATIENT
Start: 2023-04-25 | End: 2023-04-28 | Stop reason: HOSPADM

## 2023-04-25 RX ORDER — LORAZEPAM 2 MG/ML
1 INJECTION INTRAMUSCULAR EVERY 5 MIN PRN
Status: DISCONTINUED | OUTPATIENT
Start: 2023-04-25 | End: 2023-04-28 | Stop reason: HOSPADM

## 2023-04-25 RX ORDER — ACETAMINOPHEN 325 MG/1
650 TABLET ORAL EVERY 6 HOURS PRN
Status: DISCONTINUED | OUTPATIENT
Start: 2023-04-25 | End: 2023-04-28 | Stop reason: HOSPADM

## 2023-04-25 RX ORDER — GABAPENTIN 600 MG/1
600 TABLET ORAL 3 TIMES DAILY
Status: DISCONTINUED | OUTPATIENT
Start: 2023-04-25 | End: 2023-04-25

## 2023-04-25 RX ORDER — LEVETIRACETAM 500 MG/1
1000 TABLET ORAL 2 TIMES DAILY
Status: DISCONTINUED | OUTPATIENT
Start: 2023-04-25 | End: 2023-04-26

## 2023-04-25 RX ORDER — INSULIN LISPRO 100 [IU]/ML
40 INJECTION, SOLUTION INTRAVENOUS; SUBCUTANEOUS
Status: DISCONTINUED | OUTPATIENT
Start: 2023-04-25 | End: 2023-04-28 | Stop reason: HOSPADM

## 2023-04-25 RX ORDER — GABAPENTIN 600 MG/1
300 TABLET ORAL 3 TIMES DAILY
Status: DISCONTINUED | OUTPATIENT
Start: 2023-04-25 | End: 2023-04-26

## 2023-04-25 RX ORDER — AMLODIPINE BESYLATE 10 MG/1
10 TABLET ORAL DAILY
Status: DISCONTINUED | OUTPATIENT
Start: 2023-04-25 | End: 2023-04-28 | Stop reason: HOSPADM

## 2023-04-25 RX ORDER — ONDANSETRON 2 MG/ML
4 INJECTION INTRAMUSCULAR; INTRAVENOUS EVERY 6 HOURS PRN
Status: DISCONTINUED | OUTPATIENT
Start: 2023-04-25 | End: 2023-04-28 | Stop reason: HOSPADM

## 2023-04-25 RX ORDER — ONDANSETRON 4 MG/1
4 TABLET, ORALLY DISINTEGRATING ORAL EVERY 8 HOURS PRN
Status: DISCONTINUED | OUTPATIENT
Start: 2023-04-25 | End: 2023-04-28 | Stop reason: HOSPADM

## 2023-04-25 RX ORDER — LISINOPRIL 5 MG/1
5 TABLET ORAL DAILY
Status: DISCONTINUED | OUTPATIENT
Start: 2023-04-25 | End: 2023-04-28 | Stop reason: HOSPADM

## 2023-04-25 RX ORDER — ARIPIPRAZOLE 2 MG/1
2 TABLET ORAL DAILY
Status: DISCONTINUED | OUTPATIENT
Start: 2023-04-25 | End: 2023-04-28 | Stop reason: HOSPADM

## 2023-04-25 RX ORDER — SODIUM CHLORIDE 0.9 % (FLUSH) 0.9 %
5-40 SYRINGE (ML) INJECTION EVERY 12 HOURS SCHEDULED
Status: DISCONTINUED | OUTPATIENT
Start: 2023-04-25 | End: 2023-04-28 | Stop reason: HOSPADM

## 2023-04-25 RX ORDER — INSULIN GLARGINE 100 [IU]/ML
80 INJECTION, SOLUTION SUBCUTANEOUS NIGHTLY
Status: DISCONTINUED | OUTPATIENT
Start: 2023-04-25 | End: 2023-04-28 | Stop reason: HOSPADM

## 2023-04-25 RX ORDER — ENOXAPARIN SODIUM 100 MG/ML
30 INJECTION SUBCUTANEOUS 2 TIMES DAILY
Status: DISCONTINUED | OUTPATIENT
Start: 2023-04-25 | End: 2023-04-28 | Stop reason: HOSPADM

## 2023-04-25 RX ORDER — PANTOPRAZOLE SODIUM 40 MG/1
40 TABLET, DELAYED RELEASE ORAL 2 TIMES DAILY
Status: DISCONTINUED | OUTPATIENT
Start: 2023-04-25 | End: 2023-04-28 | Stop reason: HOSPADM

## 2023-04-25 RX ORDER — DULOXETIN HYDROCHLORIDE 30 MG/1
60 CAPSULE, DELAYED RELEASE ORAL DAILY
Status: DISCONTINUED | OUTPATIENT
Start: 2023-04-25 | End: 2023-04-28 | Stop reason: HOSPADM

## 2023-04-25 RX ORDER — DEXTROSE MONOHYDRATE 100 MG/ML
INJECTION, SOLUTION INTRAVENOUS CONTINUOUS PRN
Status: DISCONTINUED | OUTPATIENT
Start: 2023-04-25 | End: 2023-04-28 | Stop reason: HOSPADM

## 2023-04-25 RX ORDER — POLYETHYLENE GLYCOL 3350 17 G/17G
17 POWDER, FOR SOLUTION ORAL DAILY PRN
Status: DISCONTINUED | OUTPATIENT
Start: 2023-04-25 | End: 2023-04-28 | Stop reason: HOSPADM

## 2023-04-25 RX ORDER — SODIUM CHLORIDE 9 MG/ML
INJECTION, SOLUTION INTRAVENOUS PRN
Status: DISCONTINUED | OUTPATIENT
Start: 2023-04-25 | End: 2023-04-28 | Stop reason: HOSPADM

## 2023-04-25 RX ORDER — SODIUM CHLORIDE 0.9 % (FLUSH) 0.9 %
5-40 SYRINGE (ML) INJECTION PRN
Status: DISCONTINUED | OUTPATIENT
Start: 2023-04-25 | End: 2023-04-28 | Stop reason: HOSPADM

## 2023-04-25 RX ADMIN — GABAPENTIN 300 MG: 600 TABLET ORAL at 21:04

## 2023-04-25 RX ADMIN — AMLODIPINE BESYLATE 10 MG: 10 TABLET ORAL at 21:04

## 2023-04-25 RX ADMIN — LACOSAMIDE 150 MG: 100 TABLET, FILM COATED ORAL at 21:07

## 2023-04-25 RX ADMIN — SODIUM CHLORIDE, PRESERVATIVE FREE 10 ML: 5 INJECTION INTRAVENOUS at 21:07

## 2023-04-25 RX ADMIN — METOPROLOL TARTRATE 25 MG: 25 TABLET ORAL at 21:04

## 2023-04-25 RX ADMIN — ROPINIROLE HYDROCHLORIDE 1.5 MG: 1 TABLET, FILM COATED ORAL at 21:05

## 2023-04-25 RX ADMIN — PANTOPRAZOLE SODIUM 40 MG: 40 TABLET, DELAYED RELEASE ORAL at 21:04

## 2023-04-25 RX ADMIN — LEVETIRACETAM 1000 MG: 500 TABLET, FILM COATED ORAL at 21:05

## 2023-04-25 RX ADMIN — ROPINIROLE HYDROCHLORIDE 1.5 MG: 1 TABLET, FILM COATED ORAL at 18:58

## 2023-04-25 RX ADMIN — ENOXAPARIN SODIUM 30 MG: 100 INJECTION SUBCUTANEOUS at 21:02

## 2023-04-25 RX ADMIN — GABAPENTIN 600 MG: 600 TABLET ORAL at 18:57

## 2023-04-25 RX ADMIN — ARIPIPRAZOLE 2 MG: 2 TABLET ORAL at 21:04

## 2023-04-25 NOTE — H&P
[facility]   [address] [place] [address]    HISTORY AND PHYSICAL EXAMINATION            Date:   4/25/2023  Patient name:  Shelton Neri  Date of admission:  4/25/2023  2:07 PM  MRN:   [id number]  Account:  [de-identified]  YOB: 1970  PCP:    Bhupinder Tobias MD  Requesting physician Direct admission  Room:   [address] [place] [address]  Code Status:    Full Code    Chief Complaint:     Seizure disorder; came in for elective LTME    History Obtained From:     patient, electronic medical record    History of Present Illness: The patient is a 46 y.o. Non- / non  male with H/O R frontal subarachnoid cyst s/p R craniectomy (11/2012), intractable seizures post-craniectomy, L hemithyroidectomy d/t follicular adenoma, HTN, HLD, DM with neuropathy, DVT/PE s/p IVC filter placement (2007), bipolar disorder, chronic back pain, non-alcoholic fatty liver, who presents to the hospital for elective LTME to capture and characterize his typical spells. Patient has significant history of right craniectomy for removal of right frontal subarachnoid cyst in 11/2021. He developed seizures postprocedure. He has been on several different AEDs due to intractable seizures. Recalls being on Depakote 5 to 6 years, that he recently discontinued. Patient's seizures are very and he does not remember most of them. He described an aura stated of the head going numb and getting tingling on the left side. Sometimes he experiences eye bruising. He loses coordination and is unable to walk. Occasionally he falls down during seizures, and would've  occasional incontinence. He tries to lay down when he feels an aura, becomes confused and has hallucinations. Wife reported altered mentation can last up to 6 hours. Wife reported that he only had 2 grand mal seizures; first was after his brain surgery and the second 1 occurred approximately 10 years ago.   He has other types of sugar seizures

## 2023-04-26 LAB
GLUCOSE BLD-MCNC: 115 MG/DL (ref 75–110)
GLUCOSE BLD-MCNC: 133 MG/DL (ref 75–110)
GLUCOSE BLD-MCNC: 135 MG/DL (ref 75–110)
GLUCOSE BLD-MCNC: 142 MG/DL (ref 75–110)

## 2023-04-26 PROCEDURE — 6360000002 HC RX W HCPCS

## 2023-04-26 PROCEDURE — 95714 VEEG EA 12-26 HR UNMNTR: CPT

## 2023-04-26 PROCEDURE — 95720 EEG PHY/QHP EA INCR W/VEEG: CPT | Performed by: PSYCHIATRY & NEUROLOGY

## 2023-04-26 PROCEDURE — 6370000000 HC RX 637 (ALT 250 FOR IP): Performed by: NURSE PRACTITIONER

## 2023-04-26 PROCEDURE — APPSS30 APP SPLIT SHARED TIME 16-30 MINUTES: Performed by: NURSE PRACTITIONER

## 2023-04-26 PROCEDURE — 2060000000 HC ICU INTERMEDIATE R&B

## 2023-04-26 PROCEDURE — 99232 SBSQ HOSP IP/OBS MODERATE 35: CPT | Performed by: PSYCHIATRY & NEUROLOGY

## 2023-04-26 PROCEDURE — 2580000003 HC RX 258

## 2023-04-26 PROCEDURE — 82947 ASSAY GLUCOSE BLOOD QUANT: CPT

## 2023-04-26 PROCEDURE — 6370000000 HC RX 637 (ALT 250 FOR IP)

## 2023-04-26 RX ORDER — INSULIN GLARGINE 100 [IU]/ML
40 INJECTION, SOLUTION SUBCUTANEOUS ONCE
Status: COMPLETED | OUTPATIENT
Start: 2023-04-26 | End: 2023-04-26

## 2023-04-26 RX ADMIN — LEVETIRACETAM 1000 MG: 500 TABLET, FILM COATED ORAL at 08:56

## 2023-04-26 RX ADMIN — INSULIN GLARGINE 40 UNITS: 100 INJECTION, SOLUTION SUBCUTANEOUS at 21:28

## 2023-04-26 RX ADMIN — METOPROLOL TARTRATE 25 MG: 25 TABLET ORAL at 08:57

## 2023-04-26 RX ADMIN — METOPROLOL TARTRATE 25 MG: 25 TABLET ORAL at 20:16

## 2023-04-26 RX ADMIN — SODIUM CHLORIDE, PRESERVATIVE FREE 10 ML: 5 INJECTION INTRAVENOUS at 08:58

## 2023-04-26 RX ADMIN — PANTOPRAZOLE SODIUM 40 MG: 40 TABLET, DELAYED RELEASE ORAL at 08:58

## 2023-04-26 RX ADMIN — ROPINIROLE HYDROCHLORIDE 1.5 MG: 1 TABLET, FILM COATED ORAL at 08:57

## 2023-04-26 RX ADMIN — PANTOPRAZOLE SODIUM 40 MG: 40 TABLET, DELAYED RELEASE ORAL at 20:17

## 2023-04-26 RX ADMIN — DULOXETINE HYDROCHLORIDE 60 MG: 30 CAPSULE, DELAYED RELEASE ORAL at 08:56

## 2023-04-26 RX ADMIN — SODIUM CHLORIDE, PRESERVATIVE FREE 10 ML: 5 INJECTION INTRAVENOUS at 20:25

## 2023-04-26 RX ADMIN — ROPINIROLE HYDROCHLORIDE 1.5 MG: 1 TABLET, FILM COATED ORAL at 20:30

## 2023-04-26 RX ADMIN — LACOSAMIDE 150 MG: 100 TABLET, FILM COATED ORAL at 08:56

## 2023-04-26 RX ADMIN — ENOXAPARIN SODIUM 30 MG: 100 INJECTION SUBCUTANEOUS at 08:58

## 2023-04-26 RX ADMIN — ARIPIPRAZOLE 2 MG: 2 TABLET ORAL at 20:17

## 2023-04-26 RX ADMIN — LISINOPRIL 5 MG: 5 TABLET ORAL at 08:57

## 2023-04-26 RX ADMIN — GABAPENTIN 300 MG: 600 TABLET ORAL at 08:58

## 2023-04-26 RX ADMIN — ROPINIROLE HYDROCHLORIDE 1.5 MG: 1 TABLET, FILM COATED ORAL at 14:05

## 2023-04-26 RX ADMIN — ENOXAPARIN SODIUM 30 MG: 100 INJECTION SUBCUTANEOUS at 20:30

## 2023-04-26 RX ADMIN — AMLODIPINE BESYLATE 10 MG: 10 TABLET ORAL at 20:16

## 2023-04-26 NOTE — PLAN OF CARE
Problem: Chronic Conditions and Co-morbidities  Goal: Patient's chronic conditions and co-morbidity symptoms are monitored and maintained or improved  4/26/2023 1758 by Zipporah Gottron, RN  Outcome: Progressing  Flowsheets (Taken 4/26/2023 0800)  Care Plan - Patient's Chronic Conditions and Co-Morbidity Symptoms are Monitored and Maintained or Improved: Monitor and assess patient's chronic conditions and comorbid symptoms for stability, deterioration, or improvement  4/26/2023 0509 by Alejandrina Vallecillo RN  Outcome: Progressing     Problem: Discharge Planning  Goal: Discharge to home or other facility with appropriate resources  4/26/2023 1758 by Zipporah Gottron, RN  Outcome: Progressing  Flowsheets (Taken 4/26/2023 0800)  Discharge to home or other facility with appropriate resources: Identify barriers to discharge with patient and caregiver  4/26/2023 0509 by Alejandrina Vallecillo RN  Outcome: Progressing     Problem: ABCDS Injury Assessment  Goal: Absence of physical injury  Outcome: Progressing     Problem: Safety - Adult  Goal: Free from fall injury  Outcome: Progressing

## 2023-04-26 NOTE — PROCEDURES
Referring physician: NICANOR Interiano Ra, CNP  Date: 4/26/2023  Start Time:4/25/2023 @ 026 848 14 90  End Time: 4/26/2023 @ 026 848 14 90    Indication  Patient with possible seizures. Introduction  This continuous video-EEG was acquired using a [employer] workstation at 256 samples/s. Electrodes were placed according to the International 10-20 system. Automated spike and seizure detection algorithms were applied. Video was recorded during this study. Description  During the maximal alert state, a well-regulated 7 Hz posterior dominant rhythm was seen which was symmetrical and attenuated to eye opening. No consistent focal slowing or interhemispheric asymmetry was noted. Normal sleep structures were observed. There were few left temporal sharps. Events  No events reported. Impression  Abnormal continuous vEEG recording, the slowing mentioned above suggests mild non specific encephalopathy. The presence of left temporal sharps increases patient for focal seizures. Greg Shell MD  Epilepsy Board Certified. Neurology Board Certified.     Electronically Signed

## 2023-04-27 LAB
GLUCOSE BLD-MCNC: 131 MG/DL (ref 75–110)
GLUCOSE BLD-MCNC: 135 MG/DL (ref 75–110)
GLUCOSE BLD-MCNC: 177 MG/DL (ref 75–110)
GLUCOSE BLD-MCNC: 222 MG/DL (ref 75–110)

## 2023-04-27 PROCEDURE — 82947 ASSAY GLUCOSE BLOOD QUANT: CPT

## 2023-04-27 PROCEDURE — 2060000000 HC ICU INTERMEDIATE R&B

## 2023-04-27 PROCEDURE — 6360000002 HC RX W HCPCS

## 2023-04-27 PROCEDURE — 2700000000 HC OXYGEN THERAPY PER DAY

## 2023-04-27 PROCEDURE — 6370000000 HC RX 637 (ALT 250 FOR IP): Performed by: NURSE PRACTITIONER

## 2023-04-27 PROCEDURE — 99232 SBSQ HOSP IP/OBS MODERATE 35: CPT | Performed by: PSYCHIATRY & NEUROLOGY

## 2023-04-27 PROCEDURE — 95720 EEG PHY/QHP EA INCR W/VEEG: CPT | Performed by: PSYCHIATRY & NEUROLOGY

## 2023-04-27 PROCEDURE — 2580000003 HC RX 258

## 2023-04-27 PROCEDURE — 94761 N-INVAS EAR/PLS OXIMETRY MLT: CPT

## 2023-04-27 PROCEDURE — APPSS30 APP SPLIT SHARED TIME 16-30 MINUTES: Performed by: NURSE PRACTITIONER

## 2023-04-27 PROCEDURE — 95714 VEEG EA 12-26 HR UNMNTR: CPT

## 2023-04-27 RX ADMIN — METOPROLOL TARTRATE 25 MG: 25 TABLET ORAL at 08:44

## 2023-04-27 RX ADMIN — ROPINIROLE HYDROCHLORIDE 1.5 MG: 1 TABLET, FILM COATED ORAL at 19:37

## 2023-04-27 RX ADMIN — DULOXETINE HYDROCHLORIDE 60 MG: 30 CAPSULE, DELAYED RELEASE ORAL at 08:44

## 2023-04-27 RX ADMIN — SODIUM CHLORIDE, PRESERVATIVE FREE 10 ML: 5 INJECTION INTRAVENOUS at 19:38

## 2023-04-27 RX ADMIN — AMLODIPINE BESYLATE 10 MG: 10 TABLET ORAL at 20:06

## 2023-04-27 RX ADMIN — ENOXAPARIN SODIUM 30 MG: 100 INJECTION SUBCUTANEOUS at 08:45

## 2023-04-27 RX ADMIN — SODIUM CHLORIDE, PRESERVATIVE FREE 10 ML: 5 INJECTION INTRAVENOUS at 08:45

## 2023-04-27 RX ADMIN — METOPROLOL TARTRATE 25 MG: 25 TABLET ORAL at 19:38

## 2023-04-27 RX ADMIN — ARIPIPRAZOLE 2 MG: 2 TABLET ORAL at 20:07

## 2023-04-27 RX ADMIN — ROPINIROLE HYDROCHLORIDE 1.5 MG: 1 TABLET, FILM COATED ORAL at 08:45

## 2023-04-27 RX ADMIN — LISINOPRIL 5 MG: 5 TABLET ORAL at 08:44

## 2023-04-27 RX ADMIN — ENOXAPARIN SODIUM 30 MG: 100 INJECTION SUBCUTANEOUS at 19:38

## 2023-04-27 RX ADMIN — PANTOPRAZOLE SODIUM 40 MG: 40 TABLET, DELAYED RELEASE ORAL at 08:44

## 2023-04-27 RX ADMIN — ROPINIROLE HYDROCHLORIDE 1.5 MG: 1 TABLET, FILM COATED ORAL at 13:48

## 2023-04-27 RX ADMIN — PANTOPRAZOLE SODIUM 40 MG: 40 TABLET, DELAYED RELEASE ORAL at 19:38

## 2023-04-27 ASSESSMENT — PAIN DESCRIPTION - ORIENTATION: ORIENTATION: LEFT

## 2023-04-27 ASSESSMENT — PAIN SCALES - GENERAL: PAINLEVEL_OUTOF10: 6

## 2023-04-27 ASSESSMENT — PAIN DESCRIPTION - LOCATION: LOCATION: FOOT

## 2023-04-27 NOTE — PLAN OF CARE
Problem: Chronic Conditions and Co-morbidities  Goal: Patient's chronic conditions and co-morbidity symptoms are monitored and maintained or improved  4/27/2023 1922 by Karis Florian RN  Outcome: Progressing  Flowsheets (Taken 4/27/2023 0800)  Care Plan - Patient's Chronic Conditions and Co-Morbidity Symptoms are Monitored and Maintained or Improved: Monitor and assess patient's chronic conditions and comorbid symptoms for stability, deterioration, or improvement  4/27/2023 0601 by Carlos Lauren RN  Outcome: Progressing  Flowsheets (Taken 4/26/2023 2000)  Care Plan - Patient's Chronic Conditions and Co-Morbidity Symptoms are Monitored and Maintained or Improved: Monitor and assess patient's chronic conditions and comorbid symptoms for stability, deterioration, or improvement     Problem: Discharge Planning  Goal: Discharge to home or other facility with appropriate resources  4/27/2023 1922 by Karis Florian RN  Outcome: Progressing  Flowsheets (Taken 4/27/2023 0800)  Discharge to home or other facility with appropriate resources: Identify barriers to discharge with patient and caregiver  4/27/2023 0601 by Carlos Lauren RN  Outcome: Progressing  Flowsheets (Taken 4/26/2023 2000)  Discharge to home or other facility with appropriate resources: Identify barriers to discharge with patient and caregiver     Problem: ABCDS Injury Assessment  Goal: Absence of physical injury  4/27/2023 1922 by Karis Florian RN  Outcome: Progressing  4/27/2023 0601 by Carlos Lauren RN  Outcome: Progressing  Flowsheets (Taken 4/26/2023 2000)  Absence of Physical Injury: Implement safety measures based on patient assessment     Problem: Safety - Adult  Goal: Free from fall injury  4/27/2023 1922 by Karis Florian RN  Outcome: Progressing  4/27/2023 0601 by Carlos Lauren RN  Outcome: Progressing  Flowsheets (Taken 4/26/2023 2000)  Free From Fall Injury: Instruct family/caregiver on patient safety     Problem: Skin/Tissue Integrity  Goal:

## 2023-04-27 NOTE — PLAN OF CARE
Problem: Chronic Conditions and Co-morbidities  Goal: Patient's chronic conditions and co-morbidity symptoms are monitored and maintained or improved  4/27/2023 0601 by Jami Walter RN  Outcome: Progressing  Flowsheets (Taken 4/26/2023 2000)  Care Plan - Patient's Chronic Conditions and Co-Morbidity Symptoms are Monitored and Maintained or Improved: Monitor and assess patient's chronic conditions and comorbid symptoms for stability, deterioration, or improvement  4/26/2023 1758 by Cassandra Wright RN  Outcome: Progressing  Flowsheets (Taken 4/26/2023 0800)  Care Plan - Patient's Chronic Conditions and Co-Morbidity Symptoms are Monitored and Maintained or Improved: Monitor and assess patient's chronic conditions and comorbid symptoms for stability, deterioration, or improvement     Problem: Discharge Planning  Goal: Discharge to home or other facility with appropriate resources  4/27/2023 0601 by Jami Walter RN  Outcome: Progressing  Flowsheets (Taken 4/26/2023 2000)  Discharge to home or other facility with appropriate resources: Identify barriers to discharge with patient and caregiver  4/26/2023 1758 by Cassandra Wright RN  Outcome: Progressing  Flowsheets (Taken 4/26/2023 0800)  Discharge to home or other facility with appropriate resources: Identify barriers to discharge with patient and caregiver     Problem: ABCDS Injury Assessment  Goal: Absence of physical injury  4/27/2023 0601 by Jami Walter RN  Outcome: Progressing  Flowsheets (Taken 4/26/2023 2000)  Absence of Physical Injury: Implement safety measures based on patient assessment  4/26/2023 1758 by Cassandra Wright RN  Outcome: Progressing     Problem: Safety - Adult  Goal: Free from fall injury  4/27/2023 0601 by Jami Walter RN  Outcome: Progressing  Flowsheets (Taken 4/26/2023 2000)  Free From Fall Injury: Instruct family/caregiver on patient safety  4/26/2023 1758 by Cassandra Wright RN  Outcome: Progressing     Problem: Skin/Tissue Integrity  Goal:

## 2023-04-27 NOTE — PROCEDURES
Referring physician: Yani Ward, APRN - CNP  Date: 4/27/2023  Start Time:4/26/2023 @ 026 848 14 90  End Time: 4/27/2023 @ 026 848 14 90    Indication  Patient with possible seizures. Introduction  This continuous video-EEG was acquired using a [employer] workstation at 256 samples/s. Electrodes were placed according to the International 10-20 system. Automated spike and seizure detection algorithms were applied. Video was recorded during this study. Description  During the maximal alert state, a well-regulated 7 Hz posterior dominant rhythm was seen which was symmetrical and attenuated to eye opening. No consistent focal slowing or interhemispheric asymmetry was noted. Normal sleep structures were observed. There were few left temporal sharps. Events  4/26/2023 @ 6-7 PM staff reported an event, reviewed EEG no significant change on background rhythm. Impression  Abnormal continuous vEEG recording, the slowing mentioned above suggests mild non specific encephalopathy. The presence of left temporal sharps increases patient for focal seizures. No major changes from previous study. Masoud Gan MD  Epilepsy Board Certified. Neurology Board Certified.     Electronically Signed

## 2023-04-28 VITALS
HEART RATE: 77 BPM | RESPIRATION RATE: 18 BRPM | TEMPERATURE: 98.1 F | SYSTOLIC BLOOD PRESSURE: 137 MMHG | BODY MASS INDEX: 35.19 KG/M2 | DIASTOLIC BLOOD PRESSURE: 81 MMHG | WEIGHT: 283 LBS | HEIGHT: 75 IN | OXYGEN SATURATION: 92 %

## 2023-04-28 LAB
GLUCOSE BLD-MCNC: 181 MG/DL (ref 75–110)
GLUCOSE BLD-MCNC: 206 MG/DL (ref 75–110)

## 2023-04-28 PROCEDURE — 2580000003 HC RX 258

## 2023-04-28 PROCEDURE — 6360000002 HC RX W HCPCS: Performed by: NURSE PRACTITIONER

## 2023-04-28 PROCEDURE — 6370000000 HC RX 637 (ALT 250 FOR IP): Performed by: NURSE PRACTITIONER

## 2023-04-28 PROCEDURE — 99239 HOSP IP/OBS DSCHRG MGMT >30: CPT | Performed by: PSYCHIATRY & NEUROLOGY

## 2023-04-28 PROCEDURE — 95714 VEEG EA 12-26 HR UNMNTR: CPT

## 2023-04-28 PROCEDURE — APPSS30 APP SPLIT SHARED TIME 16-30 MINUTES: Performed by: NURSE PRACTITIONER

## 2023-04-28 PROCEDURE — 82947 ASSAY GLUCOSE BLOOD QUANT: CPT

## 2023-04-28 PROCEDURE — 99239 HOSP IP/OBS DSCHRG MGMT >30: CPT | Performed by: NURSE PRACTITIONER

## 2023-04-28 PROCEDURE — 95720 EEG PHY/QHP EA INCR W/VEEG: CPT | Performed by: PSYCHIATRY & NEUROLOGY

## 2023-04-28 PROCEDURE — 99232 SBSQ HOSP IP/OBS MODERATE 35: CPT | Performed by: PSYCHIATRY & NEUROLOGY

## 2023-04-28 PROCEDURE — 6360000002 HC RX W HCPCS

## 2023-04-28 RX ORDER — LEVETIRACETAM 10 MG/ML
1000 INJECTION INTRAVASCULAR ONCE
Status: DISCONTINUED | OUTPATIENT
Start: 2023-04-28 | End: 2023-04-28

## 2023-04-28 RX ORDER — LEVETIRACETAM 10 MG/ML
1000 INJECTION INTRAVASCULAR ONCE
Status: COMPLETED | OUTPATIENT
Start: 2023-04-28 | End: 2023-04-28

## 2023-04-28 RX ADMIN — ROPINIROLE HYDROCHLORIDE 1.5 MG: 1 TABLET, FILM COATED ORAL at 08:36

## 2023-04-28 RX ADMIN — METOPROLOL TARTRATE 25 MG: 25 TABLET ORAL at 08:35

## 2023-04-28 RX ADMIN — ROPINIROLE HYDROCHLORIDE 1.5 MG: 1 TABLET, FILM COATED ORAL at 14:22

## 2023-04-28 RX ADMIN — PANTOPRAZOLE SODIUM 40 MG: 40 TABLET, DELAYED RELEASE ORAL at 08:37

## 2023-04-28 RX ADMIN — LISINOPRIL 5 MG: 5 TABLET ORAL at 08:35

## 2023-04-28 RX ADMIN — LEVETIRACETAM 1000 MG: 10 INJECTION, SOLUTION INTRAVENOUS at 12:31

## 2023-04-28 RX ADMIN — DULOXETINE HYDROCHLORIDE 60 MG: 30 CAPSULE, DELAYED RELEASE ORAL at 08:34

## 2023-04-28 RX ADMIN — SODIUM CHLORIDE, PRESERVATIVE FREE 10 ML: 5 INJECTION INTRAVENOUS at 08:37

## 2023-04-28 RX ADMIN — ENOXAPARIN SODIUM 30 MG: 100 INJECTION SUBCUTANEOUS at 08:37

## 2023-04-28 NOTE — PROCEDURES
Referring physician: NICANOR Jarvis CNP  Date: 4/28/2023  Start Time:4/27/2023 @ 026 848 14 90  End Time: 4/28/2023 @ 1300    Indication  Patient with possible seizures. Introduction  This continuous video-EEG was acquired using a [employer] workstation at 256 samples/s. Electrodes were placed according to the International 10-20 system. Automated spike and seizure detection algorithms were applied. Video was recorded during this study. Description  During the maximal alert state, a well-regulated 7 Hz posterior dominant rhythm was seen which was symmetrical and attenuated to eye opening. No consistent focal slowing or interhemispheric asymmetry was noted. Normal sleep structures were observed. There were few left temporal sharps. Events  No events reported. Impression  Abnormal continuous vEEG recording, the slowing mentioned above suggests mild non specific encephalopathy. Limitation due motion and led artifacts. The presence of left temporal sharps increases patient for focal seizures. No major changes from previous study. Gerhardt Elam MD  Epilepsy Board Certified. Neurology Board Certified.     Electronically Signed

## 2023-04-28 NOTE — PLAN OF CARE
Problem: Chronic Conditions and Co-morbidities  Goal: Patient's chronic conditions and co-morbidity symptoms are monitored and maintained or improved  4/27/2023 2156 by Bret Martinez RN  Outcome: Progressing  4/27/2023 1922 by Cassandra Wright RN  Outcome: Fallon Fus (Taken 4/27/2023 0800)  Care Plan - Patient's Chronic Conditions and Co-Morbidity Symptoms are Monitored and Maintained or Improved: Monitor and assess patient's chronic conditions and comorbid symptoms for stability, deterioration, or improvement     Problem: Discharge Planning  Goal: Discharge to home or other facility with appropriate resources  4/27/2023 2156 by Bret Martinez RN  Outcome: Progressing  4/27/2023 1922 by Cassandra Wright RN  Outcome: Progressing  Flowsheets (Taken 4/27/2023 0800)  Discharge to home or other facility with appropriate resources: Identify barriers to discharge with patient and caregiver     Problem: ABCDS Injury Assessment  Goal: Absence of physical injury  4/27/2023 2156 by Bret Martinez RN  Outcome: Progressing  4/27/2023 1922 by Cassandra Wright RN  Outcome: Progressing     Problem: Safety - Adult  Goal: Free from fall injury  4/27/2023 2156 by Bret Martinez RN  Outcome: Progressing  4/27/2023 1922 by Cassandra Wright RN  Outcome: Progressing     Problem: Skin/Tissue Integrity  Goal: Absence of new skin breakdown  Description: 1. Monitor for areas of redness and/or skin breakdown  2. Assess vascular access sites hourly  3. Every 4-6 hours minimum:  Change oxygen saturation probe site  4. Every 4-6 hours:  If on nasal continuous positive airway pressure, respiratory therapy assess nares and determine need for appliance change or resting period.   4/27/2023 2156 by Bret Martinez RN  Outcome: Progressing  4/27/2023 1922 by Cassandra Wright RN  Outcome: Progressing     Problem: Pain  Goal: Verbalizes/displays adequate comfort level or baseline comfort level  Outcome: Progressing

## 2023-04-28 NOTE — DISCHARGE SUMMARY
[id number] [facility]rology  [address] [facility]    Discharge Summary     Patient ID: Manisha Carranza  :  1970   MRN: [id number]     ACCOUNT:  [de-identified]   Patient's PCP: Lisa Jay MD  Admit Date: 2023   Discharge Date: 2023     Length of Stay: 3  Code Status:  Full Code  Admitting Physician: Donovan Carter MD  Discharge Physician: NICANOR De Leon CNP     Active Discharge Diagnoses:     Hospital Problem Lists:  Principal Problem:    Seizure-like activity [facility])  Active Problems:    Seizure ([address]  Resolved Problems:    * No resolved hospital problems. *      Admission Condition:  stable     Discharged Condition: stable    Hospital Stay:     Hospital Course:  Manisha Carranza is a 46 y.o. male who was admitted for elective LTME. He has been on several different AEDs due to intractable seizures. Patient does not remember some of them. He described an aura stated of the head going numb and getting tingling on the left side. Sometimes he experiences eyes rolling backwards. He loses coordination and is unable to walk. Occasionally he falls down during seizures, and has occasional incontinence. He tries to lay down when he feels an aura, becomes confused and has hallucinations. Wife reported altered mentation can last up to 6 hours. Wife reported that he only had 2 grand mal seizures; first was after his brain surgery and the second 1 occurred approximately 10 years ago. He has other types of sugar seizures that last approximately 10 minutes. Wife recalled that sometimes he would have seizures where his eyes were rolled back and he would vomit. His previous EEGs have shown bifrontal and left posterior temporal sharp waves. Recordings also showed nonepileptic events. Currently supposed to be taking Keppra 2 gram BID, VImpat 250mg BID, Neurontin 600mg TID.  During currently LTME patient did report having several seizures one night but did not push

## 2023-04-28 NOTE — PROGRESS NOTES
[address] [facility] [address]  PROGRESS NOTE    Shift date: 04/27/23  Shift day: Thursday   Shift # 2    Room # 5667/0732-06   Name: Manisha Carranza                Jehovah's witness:    Place of Restorationism:     Referral: Routine Visit    Admit Date & Time: 4/25/2023  2:07 PM    Assessment:  Manisha Carranza is a 46 y.o. male in the hospital     Intervention:  Writer introduced self and title as . Patient did not appear to mind  presence. Medical staff was in room during visit.  provided a supportive presence through active listening and words of affirmation. Outcome:  Patient appeared receptive to  visit and continued receiving care from medical staff. Plan:  Chaplains will remain available to offer spiritual and emotional support as needed.       Electronically signed by Kimi Jansen on 4/27/2023 at 11:49 PM.  Bal Narciso  [phone]
FSBS 90, gave sandwich and juice, re-check FSBS 91.  Lantus held, resident notified.
NEUROLOGY INPATIENT PROGRESS NOTE    4/27/2023         Current Exam:     Chart reviewed. Discussed with RN. No episodes captured on LTME. Patient reports events occurring yesterday evening of head numnbess, R eye rolling upward, and chest pain. These are typical of his 'seizure' events. No events so far today. Brief History:    Lilia Wheeler is a  46 y.o. 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 He has been on several different AEDs due to intractable seizures. Patient's seizures are very and he does not remember most of them. He described an aura stated of the head going numb and getting tingling on the left side. Sometimes he experiences eye bruising. He loses coordination and is unable to walk. Occasionally he falls down during seizures, and would've  occasional incontinence. He tries to lay down when he feels an aura, becomes confused and has hallucinations. Wife reported altered mentation can last up to 6 hours. Wife reported that he only had 2 grand mal seizures; first was after his brain surgery and the second 1 occurred approximately 10 years ago. He has other types of sugar seizures that last approximately 10 minutes. Wife recalled that sometimes he would have seizures where his eyes were rolled back and he would vomit. His previous EEGs have shown bifrontal and left posterior temporal sharp waves. Recordings also showed nonepileptic events. Currently supposed to be taking Keppra 2 gram BID, VImpat 250mg BID, Neurontin 600mg TID. No current facility-administered medications on file prior to encounter.      Current Outpatient Medications on File Prior to Encounter   Medication Sig Dispense Refill    promethazine (PHENERGAN) 12.5 MG tablet Take 1-2 tablets by mouth
[facility])     Dr. Flaquito Solo polyp 08/30/2016    Depression     seeing Manuela Ordoñez    Esophageal abnormality     nodule     Fatty liver disease, nonalcoholic     U/S 21/9058 [facility]    Follicular adenoma of thyroid gland 01/15/2021    s/p lobectomy    Furuncle     legs    History of blood transfusion     History of Doppler ultrasound 05/29/2011    No hemodynamically significant carotid stenosis is identified. A thyroid nodule on each side. Dedicated ultrasound of thyroid gland is suggested to further evaluate if clinically indicated.      History of pulmonary embolism 2007    s/p GFF, related to knee surgery    Hx of blood clots     leg and lung    Hyperlipidemia     severely elevated triglycerides    Hypertension     diastolic    Intracranial arachnoid cyst 11/2012    Dr. Arin Browne drained, complicated with seizures, DKA    Irritable bowel syndrome     Liver disease     Dez Cordero - elevated LFT - positive smooth muscle antibody, steatosis per liver bx 3/2014 with Dr. Shelly Nolasco (multiple drug resistant organisms) resistance 2012    MRSA (methicillin resistant staph aureus) culture positive     h/o in foot and before brain surgery    Nephrolithiasis     noted on CT abdomen 9/2016, 6/2019, left renal calculi 2/2023 per renal u/s at [facility]    Neuropathy     Pancreatic insufficiency     Positive SANDY (antinuclear antibody)     Dr. Crystal Sosa - first visit in 6/2013    Prolonged emergence from general anesthesia     Restless legs syndrome     Seizures ([address]     started with brain surgery    Sinus tachycardia     Holter 3/2013 - seeing Dr. Laura Ricketts    Skull fracture [facility])     clips     Sleep apnea     positive sleep apnea per study 10/2020, s/p UPPP surgery 2021    Type II or unspecified type diabetes mellitus without mention of complication, not stated as uncontrolled 2007       Past Surgical History:   Procedure Laterality Date    CARDIAC CATHETERIZATION      2011,5-6 years ago    CARDIAC CATHETERIZATION  03/2012    CARDIAC
92%   BMI 35.37 kg/m²     Blood pressure range: Systolic (64HHG), DTJ:850 , Min:119 , BAZ:277   ; Diastolic (60YSU), FZV:03, Min:69, Max:84      Review of Systems:  Constitutional  Negative for fever and chills    HEENT  Negative for ear discharge, ear pain, nosebleed    Eyes  Negative for photophobia, pain and discharge    Respiratory  Negative for hemoptysis and sputum    Cardiovascular  Negative for orthopnea, claudication and PND    Gastrointestinal  Negative for abdominal pain, diarrhea, blood in stool    Musculoskeletal  Negative for joint pain, negative for myalgia    Skin  Negative for rash or itching    Endo/heme/allergies  Negative for polydipsia, environmental allergy    Psychiatric/behavioral  Negative for suicidal ideation. Patient is not anxious        NEUROLOGIC EXAMINATION  GENERAL  Appears comfortable and in no distress   HEENT  NC/ AT   NECK  Supple   MENTAL STATUS:  Alert, oriented, intact memory, no confusion, normal speech, normal language, no hallucination or delusion   CRANIAL NERVES: II     -      Visual fields intact to confrontation  III,IV,VI -  EOMs full, no afferent defect, no KARIS, no ptosis  V     -     Normal facial sensation  VII    -     Normal facial symmetry  VIII   -     Intact hearing  IX,X -     Symmetrical palate  XI    -     Symmetrical shoulder shrug  XII   -     Midline tongue, no atrophy    MOTOR FUNCTION:  Lifts all limbs with normal bulk, normal tone and no involuntary movements, no tremor   SENSORY FUNCTION:  Normal touch, normal pin   CEREBELLAR FUNCTION:  Intact fine motor control over upper limbs   REFLEX FUNCTION:  Symmetric, no perverted reflex, no Babinski sign   STATION and GAIT  Not tested       Data:    Lab Results:   CBC: No results for input(s): WBC, HGB, PLT in the last 72 hours. BMP:  No results for input(s): NA, K, CL, CO2, BUN, CREATININE, GLUCOSE in the last 72 hours.       Lab Results   Component Value Date    CHOL 134 02/24/2023    HDL 30 (L)

## 2023-04-28 NOTE — CARE COORDINATION
Discharge order placed in [facility]. CM spoke with patient at bedside to discuss transitional plan and he states he will be returning home with family. Patient states that his daughter will be providing transportation and he verbalizes having no additional transitional needs at this time.     Discharge [facility] Case Management Department  Written by: Anne Yancey RN    Patient Name: Shelton Neri  Attending Provider: Dario Dwyer MD  Admit Date: 2023  2:07 PM  MRN: [id number]  Account: [de-identified]                     : 1970  Discharge Date: 23      Disposition: home    Anne Yancey RN
Identified Issues/Barriers to RETURNING to current housing: none  Potential Assistance needed at discharge: (P) N/A            Potential DME:  none  Patient expects to discharge to: (P) [facility] for transportation at discharge: (P) Family (Wife will transport)    Financial    Payor: MEDICARE / Plan: MEDICARE PART A AND B / Product Type: *No Product type* /     Does insurance require precert for SNF: No    Potential assistance Purchasing Medications: (P) No  Meds-to-Beds request: Yes      [facility] [place], [address] [phone] - f [phone]  [address] [facility] [address]  Phone: [phone] Fax: [phone]    [facility] [address] [address] [facility] [phone] - f [phone]  [address] [facility]  [address] [facility] [address]  Phone: [phone] Fax: [phone]    [address] [place] [address], [address] [facility] [address] - P [phone] [facility] [phone]  [address] [place] [address] Floor [address]  [address] [place] [address]  Phone: [phone] Fax: [phone]      Notes:    Factors facilitating achievement of predicted outcomes: Family support, Cooperative, Pleasant, and Has needed Durable Medical Equipment at home    Barriers to discharge: Confusion and Medical complications    Additional Case Management Notes: Transitional planning: Plan is to return home with wife and family. Has DME. Has ride. Wife will transport. Address, emergency contact, PCP and insurance confirmed with patient. Patient denies any needs at this time. The Plan for Transition of Care is related to the following treatment goals of Seizure ([facility] [address] [S74.8]    IF APPLICABLE: The Patient and/or patient representative Caleb Caldwell and his family were provided with a choice of provider and agrees with the discharge plan.  Freedom of choice list with basic dialogue that supports the patient's individualized plan of care/goals and shares the quality data associated

## 2023-05-01 ENCOUNTER — OFFICE VISIT (OUTPATIENT)
Dept: INTERNAL MEDICINE CLINIC | Age: 53
End: 2023-05-01
Payer: MEDICARE

## 2023-05-01 ENCOUNTER — CARE COORDINATION (OUTPATIENT)
Dept: CASE MANAGEMENT | Age: 53
End: 2023-05-01

## 2023-05-01 VITALS
TEMPERATURE: 98.2 F | HEART RATE: 96 BPM | BODY MASS INDEX: 36.63 KG/M2 | SYSTOLIC BLOOD PRESSURE: 138 MMHG | DIASTOLIC BLOOD PRESSURE: 86 MMHG | WEIGHT: 294.6 LBS | HEIGHT: 75 IN

## 2023-05-01 DIAGNOSIS — E78.2 MIXED HYPERLIPIDEMIA: ICD-10-CM

## 2023-05-01 DIAGNOSIS — E55.9 VITAMIN D DEFICIENCY: ICD-10-CM

## 2023-05-01 DIAGNOSIS — I10 PRIMARY HYPERTENSION: ICD-10-CM

## 2023-05-01 DIAGNOSIS — K21.9 GASTROESOPHAGEAL REFLUX DISEASE WITHOUT ESOPHAGITIS: ICD-10-CM

## 2023-05-01 DIAGNOSIS — E34.9 ELEVATED PARATHYROID HORMONE: ICD-10-CM

## 2023-05-01 DIAGNOSIS — R56.9 SEIZURE-LIKE ACTIVITY (HCC): Primary | ICD-10-CM

## 2023-05-01 DIAGNOSIS — E11.40 TYPE 2 DIABETES MELLITUS WITH DIABETIC NEUROPATHY, WITH LONG-TERM CURRENT USE OF INSULIN (HCC): ICD-10-CM

## 2023-05-01 DIAGNOSIS — Z79.4 TYPE 2 DIABETES MELLITUS WITH DIABETIC NEUROPATHY, WITH LONG-TERM CURRENT USE OF INSULIN (HCC): ICD-10-CM

## 2023-05-01 DIAGNOSIS — G47.30 SLEEP APNEA, UNSPECIFIED TYPE: Primary | ICD-10-CM

## 2023-05-01 PROBLEM — I82.409 RECURRENT ACUTE DEEP VEIN THROMBOSIS (DVT) OF LOWER EXTREMITY (HCC): Status: RESOLVED | Noted: 2022-05-22 | Resolved: 2023-05-01

## 2023-05-01 PROCEDURE — G8427 DOCREV CUR MEDS BY ELIG CLIN: HCPCS | Performed by: INTERNAL MEDICINE

## 2023-05-01 PROCEDURE — G8417 CALC BMI ABV UP PARAM F/U: HCPCS | Performed by: INTERNAL MEDICINE

## 2023-05-01 PROCEDURE — 99214 OFFICE O/P EST MOD 30 MIN: CPT | Performed by: INTERNAL MEDICINE

## 2023-05-01 PROCEDURE — 1111F DSCHRG MED/CURRENT MED MERGE: CPT | Performed by: INTERNAL MEDICINE

## 2023-05-01 PROCEDURE — 1036F TOBACCO NON-USER: CPT | Performed by: INTERNAL MEDICINE

## 2023-05-01 PROCEDURE — 3017F COLORECTAL CA SCREEN DOC REV: CPT | Performed by: INTERNAL MEDICINE

## 2023-05-01 PROCEDURE — 2022F DILAT RTA XM EVC RTNOPTHY: CPT | Performed by: INTERNAL MEDICINE

## 2023-05-01 PROCEDURE — 3074F SYST BP LT 130 MM HG: CPT | Performed by: INTERNAL MEDICINE

## 2023-05-01 PROCEDURE — 3051F HG A1C>EQUAL 7.0%<8.0%: CPT | Performed by: INTERNAL MEDICINE

## 2023-05-01 PROCEDURE — 3078F DIAST BP <80 MM HG: CPT | Performed by: INTERNAL MEDICINE

## 2023-05-01 RX ORDER — GABAPENTIN 600 MG/1
600 TABLET ORAL 3 TIMES DAILY
Qty: 90 TABLET | Refills: 2 | Status: SHIPPED | OUTPATIENT
Start: 2023-05-01 | End: 2023-07-30

## 2023-05-01 RX ORDER — ERGOCALCIFEROL 1.25 MG/1
50000 CAPSULE ORAL WEEKLY
Qty: 12 CAPSULE | Refills: 1 | Status: SHIPPED | OUTPATIENT
Start: 2023-05-01

## 2023-05-01 RX ORDER — GABAPENTIN 600 MG/1
600 TABLET ORAL 3 TIMES DAILY
COMMUNITY
End: 2023-05-01

## 2023-05-01 RX ORDER — ROSUVASTATIN CALCIUM 5 MG/1
5 TABLET, COATED ORAL DAILY
Qty: 90 TABLET | Refills: 0 | Status: SHIPPED | OUTPATIENT
Start: 2023-05-01

## 2023-05-01 RX ORDER — LACOSAMIDE 50 MG/1
50 TABLET ORAL 2 TIMES DAILY
COMMUNITY

## 2023-05-01 RX ORDER — PANTOPRAZOLE SODIUM 40 MG/1
40 TABLET, DELAYED RELEASE ORAL 2 TIMES DAILY
Qty: 180 TABLET | Refills: 1 | Status: SHIPPED | OUTPATIENT
Start: 2023-05-01

## 2023-05-01 RX ORDER — ROPINIROLE 1 MG/1
1.5 TABLET, FILM COATED ORAL 3 TIMES DAILY
COMMUNITY

## 2023-05-01 RX ORDER — LISINOPRIL 5 MG/1
5 TABLET ORAL DAILY
Qty: 90 TABLET | Refills: 1 | Status: SHIPPED | OUTPATIENT
Start: 2023-05-01

## 2023-05-01 RX ORDER — LACOSAMIDE 200 MG/1
200 TABLET ORAL 2 TIMES DAILY
COMMUNITY

## 2023-05-01 NOTE — CARE COORDINATION
[facility] Care Transitions Initial Follow Up Call    Call within 2 business days of discharge: Yes    Patient Current Location:  Home: [address] [place] [address]    Care Transition Nurse contacted the patient by telephone to perform post hospital discharge assessment. Verified name and  with patient as identifiers. Provided introduction to self, and explanation of the Care Transition Nurse role. Patient: Eula Hess Patient : 1970   MRN: [id number]  Reason for Admission: Seizure-like activity/LTME  Discharge Date: 23 RARS: Readmission Risk Score: 10      Last Discharge  [facility]       Date Complaint Diagnosis Description Type Department Provider    23  Type 2 diabetes mellitus with diabetic neuropathy, with long-term current use of insulin ([facility] [address] Admission (Discharged) [facility] John Schulz MD            Was this an external facility discharge? No Discharge Facility: [facility]    Challenges to be reviewed by the provider   Additional needs identified to be addressed with provider: No  none               Method of communication with provider: none. Was able to contact Norm Nelson for initial transitional outreach. He stated that he was doing well today. He said that he had 3 episodes since discharge of head numbness and leg weakness. He said that the episodes last about 10 minutes then he does recovers. He did see his PCP today and will see neuro this Thursday. Reviewed medications and he stated that he had all his medications. He had no further questions or concerns. Care Transition Nurse reviewed discharge instructions with patient who verbalized understanding. The patient was given an opportunity to ask questions and does not have any further questions or concerns at this time. Were discharge instructions available to patient? Yes. Reviewed appropriate site of care based on symptoms and resources available to patient including: PCP  Specialist  When to call 911.  The patient agrees

## 2023-05-04 ENCOUNTER — OFFICE VISIT (OUTPATIENT)
Dept: NEUROLOGY | Age: 53
End: 2023-05-04
Payer: MEDICARE

## 2023-05-04 VITALS
WEIGHT: 280 LBS | BODY MASS INDEX: 35.94 KG/M2 | HEART RATE: 80 BPM | SYSTOLIC BLOOD PRESSURE: 140 MMHG | DIASTOLIC BLOOD PRESSURE: 93 MMHG | HEIGHT: 74 IN

## 2023-05-04 DIAGNOSIS — G40.909 SEIZURE DISORDER (HCC): Primary | ICD-10-CM

## 2023-05-04 PROCEDURE — 3017F COLORECTAL CA SCREEN DOC REV: CPT | Performed by: PSYCHIATRY & NEUROLOGY

## 2023-05-04 PROCEDURE — 1111F DSCHRG MED/CURRENT MED MERGE: CPT | Performed by: PSYCHIATRY & NEUROLOGY

## 2023-05-04 PROCEDURE — 99214 OFFICE O/P EST MOD 30 MIN: CPT | Performed by: PSYCHIATRY & NEUROLOGY

## 2023-05-04 PROCEDURE — 3075F SYST BP GE 130 - 139MM HG: CPT | Performed by: PSYCHIATRY & NEUROLOGY

## 2023-05-04 PROCEDURE — G8417 CALC BMI ABV UP PARAM F/U: HCPCS | Performed by: PSYCHIATRY & NEUROLOGY

## 2023-05-04 PROCEDURE — G8427 DOCREV CUR MEDS BY ELIG CLIN: HCPCS | Performed by: PSYCHIATRY & NEUROLOGY

## 2023-05-04 PROCEDURE — 3080F DIAST BP >= 90 MM HG: CPT | Performed by: PSYCHIATRY & NEUROLOGY

## 2023-05-04 PROCEDURE — 1036F TOBACCO NON-USER: CPT | Performed by: PSYCHIATRY & NEUROLOGY

## 2023-05-04 NOTE — PROGRESS NOTES
this documentation. All medical record entries made by the scribe were at my direction and in my presence. I have reviewed the chart and discharge instructions (if applicable) and agree that the record reflects my personal performance and is accurate and complete.     Electronically Signed: Luis Diallo 5/4/2023 4:54 PM    Diplomate, American Board of Psychiatry and Neurology  Diplomate, American Board of Clinical Neurophysiology  Diplomate, American Board of Epilepsy

## 2023-05-05 ENCOUNTER — CARE COORDINATION (OUTPATIENT)
Dept: CASE MANAGEMENT | Age: 53
End: 2023-05-05

## 2023-05-08 ENCOUNTER — TELEPHONE (OUTPATIENT)
Dept: NEUROLOGY | Age: 53
End: 2023-05-08

## 2023-05-08 NOTE — TELEPHONE ENCOUNTER
Hector called the office this morning stating that Dr. Haley Rizo wanted him to call today to report if he had any seizures over the weekend. Hector admitted that he had a seizure on Saturday and Sunday afternoon. Patient stated that the seizure on Saturday lasted 10 minutes and 20 - 30 minutes on Sunday. Hector confirmed that both seizures were his type A seizure where his eyes will roll in the back of his head along with numbness and tingling. Writer confirmed with patient that he is using Vimpat 250 mg BID, Keppra 2000 mg BID and Neurontin 600 mg TID. Writer explained that Dr. Rosanne Moore 5/4/23 note states that he is using Neurontin 600 mg QID. Servando Jennings stated that the dose was lowered to TID during his February hospitalization. This information was given to Dr. Haley Rizo. He stated the patient just had an inpatient LTME at [facility]. V's, however they were unable to capture any events. Dr. Haley Rizo would like to order 72 hour home LTME in hopes of capturing an event. Call placed to the patient and this information was given. Patient is agreeable to have home LTME. Writer explained that an electronic order will be sent to [facility] and once they get approval from his insurance they will contact him to schedule. Writer did advise that the call center for [facility] is located in [place]. Writer asked that he call if there were any problems or if not contacted by [facility] by next Monday. Patient verbally stated his understanding.

## 2023-05-09 ENCOUNTER — CARE COORDINATION (OUTPATIENT)
Dept: CASE MANAGEMENT | Age: 53
End: 2023-05-09

## 2023-05-09 NOTE — CARE COORDINATION
Care Transitions Outreach Attempt: Sub attempt #2    Call within 2 business days of discharge: Yes   Attempted to reach patient for transitions of care follow up. Unable to reach patient. Left HIPAA compliant voicemail requesting patint return call to writer at [phone]. CT episode will be closed due to 2nd unsuccessful outreach attempt. Patient: Rob Upton Patient : 1970 MRN: [id number]    Last Discharge [address]       Date Complaint Diagnosis Description Type Department Provider    23  Type 2 diabetes mellitus with diabetic neuropathy, with long-term current use of insulin ([facility] [address] Admission (Discharged) [facility] Marly Gold MD              Was this an external facility discharge?  No Discharge Facility: [facility]     Noted following upcoming appointments from discharge chart review:   [facility] follow up appointment(s):   Future Appointments   Date Time Provider Staci Jay   2023  2:00 PM Elma Jones RN [facility] Physic [facility] [facility]   2023  9:30 AM Alex Spencer MD [address] [place] [address]   8/10/2023 12:40 PM Marly Gold MD Neuro Spec Neurology -     [facility] follow up appointment(s):     [facility] LPN  Care Transition Nurse  [phone]

## 2023-05-15 PROBLEM — R56.9 SEIZURE (HCC): Status: RESOLVED | Noted: 2018-10-16 | Resolved: 2023-05-15

## 2023-05-15 PROBLEM — R56.9 PARTIAL SEIZURES (HCC): Status: RESOLVED | Noted: 2021-11-30 | Resolved: 2023-05-15

## 2023-05-15 PROBLEM — D49.7 FOLLICULAR NEOPLASM OF THYROID: Status: RESOLVED | Noted: 2020-10-20 | Resolved: 2023-05-15

## 2023-05-15 PROBLEM — R56.9 SEIZURE-LIKE ACTIVITY (HCC): Status: RESOLVED | Noted: 2023-04-25 | Resolved: 2023-05-15

## 2023-05-17 ENCOUNTER — NURSE ONLY (OUTPATIENT)
Dept: LAB | Age: 53
End: 2023-05-17

## 2023-05-17 DIAGNOSIS — E11.40 TYPE 2 DIABETES MELLITUS WITH DIABETIC NEUROPATHY, WITH LONG-TERM CURRENT USE OF INSULIN (HCC): ICD-10-CM

## 2023-05-17 DIAGNOSIS — E83.42 HYPOMAGNESEMIA: ICD-10-CM

## 2023-05-17 DIAGNOSIS — R10.9 RIGHT SIDED ABDOMINAL PAIN: ICD-10-CM

## 2023-05-17 DIAGNOSIS — Z79.4 TYPE 2 DIABETES MELLITUS WITH HYPERGLYCEMIA, WITH LONG-TERM CURRENT USE OF INSULIN (HCC): ICD-10-CM

## 2023-05-17 DIAGNOSIS — E89.0 S/P PARTIAL THYROIDECTOMY: ICD-10-CM

## 2023-05-17 DIAGNOSIS — E11.65 TYPE 2 DIABETES MELLITUS WITH HYPERGLYCEMIA, WITH LONG-TERM CURRENT USE OF INSULIN (HCC): ICD-10-CM

## 2023-05-17 DIAGNOSIS — Z79.4 TYPE 2 DIABETES MELLITUS WITH DIABETIC NEUROPATHY, WITH LONG-TERM CURRENT USE OF INSULIN (HCC): ICD-10-CM

## 2023-05-17 DIAGNOSIS — I10 PRIMARY HYPERTENSION: ICD-10-CM

## 2023-05-17 DIAGNOSIS — E55.9 VITAMIN D DEFICIENCY: ICD-10-CM

## 2023-05-17 DIAGNOSIS — E78.2 MIXED HYPERLIPIDEMIA: ICD-10-CM

## 2023-05-17 DIAGNOSIS — R19.7 DIARRHEA, UNSPECIFIED TYPE: ICD-10-CM

## 2023-05-17 DIAGNOSIS — E34.9 ELEVATED PARATHYROID HORMONE: ICD-10-CM

## 2023-05-17 LAB
25(OH)D3 SERPL-MCNC: 33 NG/ML (ref 30–100)
ALBUMIN SERPL BCG-MCNC: 4.3 G/DL (ref 3.5–5.1)
ALP SERPL-CCNC: 96 U/L (ref 38–126)
ALT SERPL W/O P-5'-P-CCNC: 22 U/L (ref 11–66)
ANION GAP SERPL CALC-SCNC: 12 MEQ/L (ref 8–16)
AST SERPL-CCNC: 20 U/L (ref 5–40)
BASOPHILS ABSOLUTE: 0 THOU/MM3 (ref 0–0.1)
BASOPHILS NFR BLD AUTO: 0.4 %
BILIRUB CONJ SERPL-MCNC: 0.3 MG/DL (ref 0–0.3)
BILIRUB SERPL-MCNC: 1.6 MG/DL (ref 0.3–1.2)
BUN SERPL-MCNC: 11 MG/DL (ref 7–22)
CALCIUM SERPL-MCNC: 9.4 MG/DL (ref 8.5–10.5)
CHLORIDE SERPL-SCNC: 102 MEQ/L (ref 98–111)
CO2 SERPL-SCNC: 28 MEQ/L (ref 23–33)
CREAT SERPL-MCNC: 0.8 MG/DL (ref 0.4–1.2)
CREAT UR-MCNC: 359.4 MG/DL
DEPRECATED RDW RBC AUTO: 39.6 FL (ref 35–45)
EOSINOPHIL NFR BLD AUTO: 2.1 %
EOSINOPHILS ABSOLUTE: 0.2 THOU/MM3 (ref 0–0.4)
ERYTHROCYTE [DISTWIDTH] IN BLOOD BY AUTOMATED COUNT: 12.8 % (ref 11.5–14.5)
GFR SERPL CREATININE-BSD FRML MDRD: > 60 ML/MIN/1.73M2
GLUCOSE SERPL-MCNC: 133 MG/DL (ref 70–108)
HCT VFR BLD AUTO: 40.3 % (ref 42–52)
HGB BLD-MCNC: 14.2 GM/DL (ref 14–18)
IMM GRANULOCYTES # BLD AUTO: 0.04 THOU/MM3 (ref 0–0.07)
IMM GRANULOCYTES NFR BLD AUTO: 0.5 %
LDLC SERPL DIRECT ASSAY-MCNC: 53.3 MG/DL
LYMPHOCYTES ABSOLUTE: 1.6 THOU/MM3 (ref 1–4.8)
LYMPHOCYTES NFR BLD AUTO: 19.4 %
MAGNESIUM SERPL-MCNC: 1.5 MG/DL (ref 1.6–2.4)
MCH RBC QN AUTO: 30.8 PG (ref 26–33)
MCHC RBC AUTO-ENTMCNC: 35.2 GM/DL (ref 32.2–35.5)
MCV RBC AUTO: 87.4 FL (ref 80–94)
MICROALBUMIN UR-MCNC: 36.77 MG/DL
MICROALBUMIN/CREAT RATIO PNL UR: 102 MG/G (ref 0–30)
MONOCYTES ABSOLUTE: 0.5 THOU/MM3 (ref 0.4–1.3)
MONOCYTES NFR BLD AUTO: 5.8 %
NEUTROPHILS NFR BLD AUTO: 71.8 %
NRBC BLD AUTO-RTO: 0 /100 WBC
PLATELET # BLD AUTO: 195 THOU/MM3 (ref 130–400)
PMV BLD AUTO: 9.9 FL (ref 9.4–12.4)
POTASSIUM SERPL-SCNC: 4.1 MEQ/L (ref 3.5–5.2)
PROT SERPL-MCNC: 7.1 G/DL (ref 6.1–8)
PTH-INTACT SERPL-MCNC: 19.8 PG/ML (ref 15–65)
RBC # BLD AUTO: 4.61 MILL/MM3 (ref 4.7–6.1)
SEGMENTED NEUTROPHILS ABSOLUTE COUNT: 6 THOU/MM3 (ref 1.8–7.7)
SODIUM SERPL-SCNC: 142 MEQ/L (ref 135–145)
TSH SERPL DL<=0.005 MIU/L-ACNC: 2.94 UIU/ML (ref 0.4–4.2)
WBC # BLD AUTO: 8.4 THOU/MM3 (ref 4.8–10.8)

## 2023-05-23 ENCOUNTER — TELEPHONE (OUTPATIENT)
Dept: INTERNAL MEDICINE CLINIC | Age: 53
End: 2023-05-23

## 2023-05-23 RX ORDER — LISINOPRIL 10 MG/1
10 TABLET ORAL DAILY
Qty: 90 TABLET | Refills: 1 | Status: SHIPPED | OUTPATIENT
Start: 2023-05-23

## 2023-05-23 RX ORDER — BACLOFEN 20 MG
500 TABLET ORAL DAILY
COMMUNITY

## 2023-05-23 RX ORDER — LISINOPRIL 10 MG/1
10 TABLET ORAL DAILY
COMMUNITY
End: 2023-05-23 | Stop reason: SDUPTHER

## 2023-05-23 NOTE — TELEPHONE ENCOUNTER
----- Message from Kaur Hung MD sent at 5/20/2023  9:07 PM EDT -----  Please call patient and let them know results - low magnesium - add magnesium oxide 500 mg daily or slo mag equivalent. Increased microalbumin/Cr ratio - increase lisinopril to 10 mg daily. Let me know how BP is doing after a week.

## 2023-07-25 ENCOUNTER — OFFICE VISIT (OUTPATIENT)
Dept: INTERNAL MEDICINE CLINIC | Age: 53
End: 2023-07-25
Payer: MEDICARE

## 2023-07-25 ENCOUNTER — TELEPHONE (OUTPATIENT)
Dept: INTERNAL MEDICINE CLINIC | Age: 53
End: 2023-07-25

## 2023-07-25 VITALS
HEIGHT: 74 IN | HEART RATE: 79 BPM | SYSTOLIC BLOOD PRESSURE: 124 MMHG | DIASTOLIC BLOOD PRESSURE: 78 MMHG | WEIGHT: 292.2 LBS | BODY MASS INDEX: 37.5 KG/M2 | TEMPERATURE: 98.1 F

## 2023-07-25 DIAGNOSIS — Z79.4 TYPE 2 DIABETES MELLITUS WITH DIABETIC NEUROPATHY, WITH LONG-TERM CURRENT USE OF INSULIN (HCC): Primary | ICD-10-CM

## 2023-07-25 DIAGNOSIS — E11.40 TYPE 2 DIABETES MELLITUS WITH DIABETIC NEUROPATHY, WITH LONG-TERM CURRENT USE OF INSULIN (HCC): Primary | ICD-10-CM

## 2023-07-25 LAB — HBA1C MFR BLD: 7 %

## 2023-07-25 PROCEDURE — NBSRV NON-BILLABLE SERVICE: Performed by: REGISTERED NURSE

## 2023-07-25 PROCEDURE — 83037 HB GLYCOSYLATED A1C HOME DEV: CPT | Performed by: REGISTERED NURSE

## 2023-07-25 PROCEDURE — G0108 DIAB MANAGE TRN  PER INDIV: HCPCS | Performed by: REGISTERED NURSE

## 2023-07-25 RX ORDER — LIDOCAINE 50 MG/G
1 PATCH TOPICAL DAILY
COMMUNITY
Start: 2023-07-18

## 2023-07-25 RX ORDER — LORAZEPAM 1 MG/1
1 TABLET ORAL PRN
COMMUNITY
Start: 2023-06-16

## 2023-07-25 RX ORDER — NEOMYCIN SULFATE, POLYMYXIN B SULFATE AND DEXAMETHASONE 3.5; 10000; 1 MG/ML; [USP'U]/ML; MG/ML
1 SUSPENSION/ DROPS OPHTHALMIC 4 TIMES DAILY
COMMUNITY
Start: 2023-07-21

## 2023-07-25 NOTE — PATIENT INSTRUCTIONS
We will check with [employer]  about assistance for [employer] and Humalog  Keep checking blood sugars before meals and bedtime         Goal for blood sugar   Basaglar 85 units at bedtime  Good job drinking beverages without sugar and limiting carb intake  Stay active -try using your stretch band for 5-10 minutes sitting in          The chair --arms and knees

## 2023-07-25 NOTE — TELEPHONE ENCOUNTER
[address]  PROGRESS NOTE  Chief Complaint:   Patient presents with: Follow - Up: diabetes      HPI:   This is a 76year old male presents to clinic for follow-up from recent multiple ER visit due to elevated blood sugar level.   Patient Diabetes Pharmacotherapy:  Basgalar 80 units at bedtime  Humalog 30-40 units --over 160 BS or eating                    --ususally 3 doses per day  Trulicity 6.9GP [id number] not taken since 4/2023. Ran out and did not get any more supply from [employer]     Glucose Trends:   Glucose at 0.5 hrs PPD today resulted at 208mg/dl  Current monitoring regimen: Fingerstick blood tests - 4 times daily  Home blood sugar trends:               -Fasting AM: 220-359              -Before lunch: 197-270              -Before dinner: 165-346              -Bedtime: 118-529  Any episodes of hypoglycemia? No; none recently    Dr. Azalia Gerardo has been out of [email or web address] since 9/4973. We are attempting to get his supply of Trulicity resumed via [employer]. In the interim, Please authorize the Diabetes Clinic at [address] [place] [address] to teach/ assist Hector to increase his Basaglar to 85 units to help get glucose readings in better control. .   If you agree, please note and return. Thank you. ARIPiprazole 20 MG Oral Tab Take 20 mg by mouth daily. • aspirin EC 81 MG Oral Tab EC Take 81 mg by mouth daily. • atorvastatin 20 MG Oral Tab Take 20 mg by mouth nightly. • FLUoxetine HCl 20 MG Oral Cap Take 20 mg by mouth daily.  Take 3 capsul tingling in the extremities,change in bowel or bladder control. HEMATOLOGIC:  Denies anemia, bleeding or bruising. PSYCHIATRIC:  Denies depression or anxiety. ENDOCRINOLOGIC:  Denies excessive sweating, cold or heat intolerance, polyuria or polydipsia. with same dose of sliding scale on novolog. See podiatrist for dry skin and wound on foot. See endocrinologist.     Felipe Goldman to ER if blood glucose level more than 500. Return to clinic if any concern.          Health Maintenance:  LDL Control Never done

## 2023-07-31 NOTE — TELEPHONE ENCOUNTER
Hector instructed to increase his Basaglar to 85 units to help get glucose readings in better control. [address] [place] [address] application for 4536 completed and faxed to [address] [place] [address].

## 2023-08-02 DIAGNOSIS — Z79.4 TYPE 2 DIABETES MELLITUS WITH DIABETIC NEUROPATHY, WITH LONG-TERM CURRENT USE OF INSULIN (HCC): ICD-10-CM

## 2023-08-02 DIAGNOSIS — E11.40 TYPE 2 DIABETES MELLITUS WITH DIABETIC NEUROPATHY, WITH LONG-TERM CURRENT USE OF INSULIN (HCC): ICD-10-CM

## 2023-08-02 DIAGNOSIS — G40.909 SEIZURE DISORDER (HCC): ICD-10-CM

## 2023-08-03 RX ORDER — LEVETIRACETAM 1000 MG/1
TABLET ORAL
Qty: 120 TABLET | Refills: 1 | Status: SHIPPED | OUTPATIENT
Start: 2023-08-03

## 2023-08-03 NOTE — TELEPHONE ENCOUNTER
Pharmacy requesting refill of Keppra 1000mg.       Medication active on med list yes      Date of last Rx: 12/5/2022 with 3 refills          verified by Raiza Tijerina LPN      Date of last appointment 5/4/2023    Next Visit Date:  8/10/2023

## 2023-08-04 RX ORDER — ROSUVASTATIN CALCIUM 5 MG/1
TABLET, COATED ORAL
Qty: 90 TABLET | Refills: 1 | Status: SHIPPED | OUTPATIENT
Start: 2023-08-04

## 2023-08-09 ENCOUNTER — TELEPHONE (OUTPATIENT)
Dept: INTERNAL MEDICINE CLINIC | Age: 53
End: 2023-08-09

## 2023-08-09 NOTE — TELEPHONE ENCOUNTER
Per request, called patient. He stated that [employer] did reach out to him and they did receive the renewal for Trulicity and [employer]. However, he has not received them yet.

## 2023-08-21 ENCOUNTER — OFFICE VISIT (OUTPATIENT)
Dept: INTERNAL MEDICINE CLINIC | Age: 53
End: 2023-08-21
Payer: MEDICARE

## 2023-08-21 VITALS
TEMPERATURE: 98.7 F | HEIGHT: 74 IN | WEIGHT: 292.2 LBS | DIASTOLIC BLOOD PRESSURE: 80 MMHG | BODY MASS INDEX: 37.5 KG/M2 | SYSTOLIC BLOOD PRESSURE: 132 MMHG | HEART RATE: 92 BPM

## 2023-08-21 DIAGNOSIS — Z79.4 TYPE 2 DIABETES MELLITUS WITH DIABETIC NEUROPATHY, WITH LONG-TERM CURRENT USE OF INSULIN (HCC): ICD-10-CM

## 2023-08-21 DIAGNOSIS — K21.9 GASTROESOPHAGEAL REFLUX DISEASE WITHOUT ESOPHAGITIS: ICD-10-CM

## 2023-08-21 DIAGNOSIS — E55.9 VITAMIN D DEFICIENCY: ICD-10-CM

## 2023-08-21 DIAGNOSIS — E11.40 TYPE 2 DIABETES MELLITUS WITH DIABETIC NEUROPATHY, WITH LONG-TERM CURRENT USE OF INSULIN (HCC): ICD-10-CM

## 2023-08-21 PROCEDURE — 99214 OFFICE O/P EST MOD 30 MIN: CPT | Performed by: STUDENT IN AN ORGANIZED HEALTH CARE EDUCATION/TRAINING PROGRAM

## 2023-08-21 PROCEDURE — 2022F DILAT RTA XM EVC RTNOPTHY: CPT | Performed by: STUDENT IN AN ORGANIZED HEALTH CARE EDUCATION/TRAINING PROGRAM

## 2023-08-21 PROCEDURE — 3078F DIAST BP <80 MM HG: CPT | Performed by: STUDENT IN AN ORGANIZED HEALTH CARE EDUCATION/TRAINING PROGRAM

## 2023-08-21 PROCEDURE — 3074F SYST BP LT 130 MM HG: CPT | Performed by: STUDENT IN AN ORGANIZED HEALTH CARE EDUCATION/TRAINING PROGRAM

## 2023-08-21 PROCEDURE — 3017F COLORECTAL CA SCREEN DOC REV: CPT | Performed by: STUDENT IN AN ORGANIZED HEALTH CARE EDUCATION/TRAINING PROGRAM

## 2023-08-21 PROCEDURE — 3051F HG A1C>EQUAL 7.0%<8.0%: CPT | Performed by: STUDENT IN AN ORGANIZED HEALTH CARE EDUCATION/TRAINING PROGRAM

## 2023-08-21 PROCEDURE — G8427 DOCREV CUR MEDS BY ELIG CLIN: HCPCS | Performed by: STUDENT IN AN ORGANIZED HEALTH CARE EDUCATION/TRAINING PROGRAM

## 2023-08-21 PROCEDURE — G8417 CALC BMI ABV UP PARAM F/U: HCPCS | Performed by: STUDENT IN AN ORGANIZED HEALTH CARE EDUCATION/TRAINING PROGRAM

## 2023-08-21 PROCEDURE — 1036F TOBACCO NON-USER: CPT | Performed by: STUDENT IN AN ORGANIZED HEALTH CARE EDUCATION/TRAINING PROGRAM

## 2023-08-21 RX ORDER — DULAGLUTIDE 3 MG/.5ML
3 INJECTION, SOLUTION SUBCUTANEOUS WEEKLY
Qty: 4 ADJUSTABLE DOSE PRE-FILLED PEN SYRINGE | Refills: 2 | Status: SHIPPED | OUTPATIENT
Start: 2023-08-21 | End: 2023-08-21

## 2023-08-21 RX ORDER — PANTOPRAZOLE SODIUM 40 MG/1
40 TABLET, DELAYED RELEASE ORAL 2 TIMES DAILY
Qty: 180 TABLET | Refills: 1 | Status: SHIPPED | OUTPATIENT
Start: 2023-08-21 | End: 2023-08-21

## 2023-08-21 RX ORDER — INSULIN GLARGINE 100 [IU]/ML
45 INJECTION, SOLUTION SUBCUTANEOUS 2 TIMES DAILY
Qty: 30 ML | Refills: 2 | Status: SHIPPED | OUTPATIENT
Start: 2023-08-21

## 2023-08-21 RX ORDER — ERGOCALCIFEROL 1.25 MG/1
50000 CAPSULE ORAL WEEKLY
Qty: 12 CAPSULE | Refills: 1 | Status: SHIPPED | OUTPATIENT
Start: 2023-08-21 | End: 2023-08-21

## 2023-08-21 RX ORDER — DULAGLUTIDE 3 MG/.5ML
3 INJECTION, SOLUTION SUBCUTANEOUS WEEKLY
Qty: 12 ADJUSTABLE DOSE PRE-FILLED PEN SYRINGE | Refills: 1 | Status: SHIPPED | OUTPATIENT
Start: 2023-08-21

## 2023-08-21 RX ORDER — ERGOCALCIFEROL 1.25 MG/1
50000 CAPSULE ORAL WEEKLY
Qty: 12 CAPSULE | Refills: 1 | Status: SHIPPED | OUTPATIENT
Start: 2023-08-21

## 2023-08-21 RX ORDER — GABAPENTIN 600 MG/1
600 TABLET ORAL 3 TIMES DAILY
Qty: 90 TABLET | Refills: 2 | Status: SHIPPED | OUTPATIENT
Start: 2023-08-21 | End: 2023-08-21

## 2023-08-21 RX ORDER — PANTOPRAZOLE SODIUM 40 MG/1
40 TABLET, DELAYED RELEASE ORAL 2 TIMES DAILY
Qty: 180 TABLET | Refills: 1 | Status: SHIPPED | OUTPATIENT
Start: 2023-08-21

## 2023-08-21 RX ORDER — INSULIN GLARGINE 100 [IU]/ML
45 INJECTION, SOLUTION SUBCUTANEOUS 2 TIMES DAILY
Qty: 30 ML | Refills: 2 | Status: SHIPPED | OUTPATIENT
Start: 2023-08-21 | End: 2023-08-21

## 2023-08-21 RX ORDER — GABAPENTIN 600 MG/1
600 TABLET ORAL 3 TIMES DAILY
Qty: 90 TABLET | Refills: 2 | Status: SHIPPED | OUTPATIENT
Start: 2023-08-21 | End: 2023-11-19

## 2023-08-21 RX ORDER — DULAGLUTIDE 3 MG/.5ML
3 INJECTION, SOLUTION SUBCUTANEOUS WEEKLY
Qty: 4 ADJUSTABLE DOSE PRE-FILLED PEN SYRINGE | Refills: 2 | Status: SHIPPED | OUTPATIENT
Start: 2023-08-21 | End: 2023-08-21 | Stop reason: SDUPTHER

## 2023-08-21 SDOH — ECONOMIC STABILITY: FOOD INSECURITY: WITHIN THE PAST 12 MONTHS, THE FOOD YOU BOUGHT JUST DIDN'T LAST AND YOU DIDN'T HAVE MONEY TO GET MORE.: NEVER TRUE

## 2023-08-21 SDOH — ECONOMIC STABILITY: FOOD INSECURITY: WITHIN THE PAST 12 MONTHS, YOU WORRIED THAT YOUR FOOD WOULD RUN OUT BEFORE YOU GOT MONEY TO BUY MORE.: NEVER TRUE

## 2023-08-21 SDOH — ECONOMIC STABILITY: INCOME INSECURITY: HOW HARD IS IT FOR YOU TO PAY FOR THE VERY BASICS LIKE FOOD, HOUSING, MEDICAL CARE, AND HEATING?: NOT HARD AT ALL

## 2023-08-21 NOTE — PROGRESS NOTES
Recommended 10% increase in basal insulin and increase in Trulicity to 3mg. Paperwork prepared for [employer] [place].      For Pharmacy Admin Tracking Only    Program: Medical Group  CPA in place:  No  Recommendation Provided To: Provider: 1 via Verbally to provider and Patient/Caregiver: 1 via In person  Intervention Detail: Dose Adjustment: 1, reason: Therapy Optimization and Patient Access Assistance/Sample Provided  Intervention Accepted By: Provider: 1 and Patient/Caregiver: 1  Gap Closed?: No   Time Spent (min): [address], PharmD, BCPS, [facility]  Internal Medicine Clinical Pharmacist  [phone]
A1C: 7.0% 7/25/2023. On Gabapentin for neuropathy. [facility]  nephrology (Dr. Manriquez Necessary). LDL 79 2/24/23 on no statin. Last Eye Exam July 2023, gets foot exams every month. Current regimen 1.5 Trulicity with 01X nightly Lantus but he increased this to 80 BID by himself. Also on SSI. Per his sugar logbook, has been having highs ~430 and lows ~50s. Will start Lantus 45u BID but if sugars running too high, he can increase insulin. Goal is to keep sugar between 130-180. Increase Trulicity up to 3.0     GERD: Well-controlled. Protonix refilled     Vitamin D Deficiency: Vitamin D refilled     Return to clinic in 4 weeks    Case and impression/plan reviewed in collaboration with Dr. Cuco Hu DO    Electronically signed by   Sully Hernandez DO on 8/21/2023 at 7:54 PM    [address] [facility]  [address] [place] [address]  Dept: [phone]  Dept Fax: [phone] : [phone]

## 2023-08-24 ENCOUNTER — TELEPHONE (OUTPATIENT)
Dept: INTERNAL MEDICINE CLINIC | Age: 53
End: 2023-08-24

## 2023-08-24 NOTE — TELEPHONE ENCOUNTER
Per Pt's insurance Insuline Glargine Solostar is not preferred. They are requesting an alternative.  Please see fax attached

## 2023-09-05 ENCOUNTER — TELEPHONE (OUTPATIENT)
Dept: INTERNAL MEDICINE CLINIC | Age: 53
End: 2023-09-05

## 2023-09-05 NOTE — TELEPHONE ENCOUNTER
Hector reports that he's running low on his Humalog (1 pen remaining). It appears that the Humalog order (with a year's worth of refills) was sent in 2/2023. Instructed Hector to call [facility] to request a refill.      For Pharmacy Admin Tracking Only    Program: Medical Group  Time Spent (min): [address] [facility], PharmD, [facility]  Internal Medicine Clinical Pharmacist  [phone]'

## 2023-09-20 DIAGNOSIS — G40.909 SEIZURE DISORDER (HCC): Primary | ICD-10-CM

## 2023-09-21 RX ORDER — LACOSAMIDE 200 MG/1
TABLET, FILM COATED ORAL
Qty: 60 TABLET | Refills: 1 | Status: SHIPPED | OUTPATIENT
Start: 2023-09-21 | End: 2023-11-20

## 2023-09-21 RX ORDER — LACOSAMIDE 50 MG/1
50 TABLET ORAL 2 TIMES DAILY
Qty: 60 TABLET | Refills: 1 | Status: SHIPPED | OUTPATIENT
Start: 2023-09-21 | End: 2023-11-20

## 2023-09-21 NOTE — TELEPHONE ENCOUNTER
Pharmacy requesting refill of Lacosamide 200mg.       Medication active on med list yes      Date of last Rx: 5/1/2023 with 0 refills          verified by Catarino Jauregui LPN      Date of last appointment 5/4/2023    Next Visit Date:  11/22/2023

## 2023-09-21 NOTE — TELEPHONE ENCOUNTER
Pharmacy requesting refill of lacosamide 50mg.       Medication active on med list yes      Date of last Rx: 5/1/2023 with 0 refills          verified by Gregoria Sanchez LPN      Date of last appointment 5/4/2023    Next Visit Date:  11/22/2023

## 2023-09-28 ENCOUNTER — APPOINTMENT (OUTPATIENT)
Dept: GENERAL RADIOLOGY | Age: 53
End: 2023-09-28
Payer: MEDICARE

## 2023-09-28 ENCOUNTER — HOSPITAL ENCOUNTER (EMERGENCY)
Age: 53
Discharge: HOME | End: 2023-09-28
Payer: MEDICARE

## 2023-09-28 VITALS
SYSTOLIC BLOOD PRESSURE: 163 MMHG | OXYGEN SATURATION: 95 % | TEMPERATURE: 98.1 F | RESPIRATION RATE: 18 BRPM | HEART RATE: 91 BPM | DIASTOLIC BLOOD PRESSURE: 104 MMHG

## 2023-09-28 DIAGNOSIS — M79.671 BILATERAL FOOT PAIN: ICD-10-CM

## 2023-09-28 DIAGNOSIS — G60.0 CMTD (CHARCOT-MARIE-TOOTH DISEASE): Primary | ICD-10-CM

## 2023-09-28 DIAGNOSIS — M79.672 BILATERAL FOOT PAIN: ICD-10-CM

## 2023-09-28 PROCEDURE — 73630 X-RAY EXAM OF FOOT: CPT

## 2023-09-28 PROCEDURE — 99283 EMERGENCY DEPT VISIT LOW MDM: CPT

## 2023-09-28 PROCEDURE — 6370000000 HC RX 637 (ALT 250 FOR IP): Performed by: PHYSICIAN ASSISTANT

## 2023-09-28 RX ORDER — HYDROCODONE BITARTRATE AND ACETAMINOPHEN 5; 325 MG/1; MG/1
1 TABLET ORAL NIGHTLY PRN
Qty: 10 TABLET | Refills: 0 | Status: SHIPPED | OUTPATIENT
Start: 2023-09-28 | End: 2023-10-12

## 2023-09-28 RX ORDER — HYDROCODONE BITARTRATE AND ACETAMINOPHEN 5; 325 MG/1; MG/1
1 TABLET ORAL ONCE
Status: COMPLETED | OUTPATIENT
Start: 2023-09-28 | End: 2023-09-28

## 2023-09-28 RX ADMIN — HYDROCODONE BITARTRATE AND ACETAMINOPHEN 1 TABLET: 5; 325 TABLET ORAL at 14:44

## 2023-09-28 ASSESSMENT — PAIN DESCRIPTION - ORIENTATION: ORIENTATION: RIGHT;LEFT

## 2023-09-28 ASSESSMENT — PAIN - FUNCTIONAL ASSESSMENT: PAIN_FUNCTIONAL_ASSESSMENT: 0-10

## 2023-09-28 ASSESSMENT — PAIN DESCRIPTION - LOCATION: LOCATION: FOOT

## 2023-09-28 ASSESSMENT — ENCOUNTER SYMPTOMS
NAUSEA: 0
SHORTNESS OF BREATH: 0
VOMITING: 0

## 2023-09-28 ASSESSMENT — PAIN SCALES - GENERAL: PAINLEVEL_OUTOF10: 9

## 2023-09-28 NOTE — ED NOTES
Pt in through ED omari. He is a pt of Dr. Jim Cano in podiatry and has a history of Charcot's foot. He states the last several weeks he has had increased bilateral foot pain. He has been taking tylenol without relief. He called Dr. Joseph Saavedra office and was told he would need to see pain management.      Mariaelena Hale RN  09/28/23 [id number]

## 2023-09-28 NOTE — CONSULTS
COMPARISON: No prior study. TECHNIQUE: 4 views of the left foot. FINDINGS: There is a large amount soft tissue swelling. There is no acute fracture. There is joint space narrowing between the tarsal and metatarsals. There is spurring at the plantar aspect of the calcaneus. The base of the fifth metatarsal is intact. Soft tissue and degenerative changes. No acute fracture. **This report has been created using voice recognition software. It may contain minor errors which are inherent in voice recognition technology. ** Final report electronically signed by Dr Monica Mcqueen on 9/28/2023 3:19 PM    XR FOOT RIGHT (MIN 3 VIEWS)    Result Date: 9/28/2023  PROCEDURE: XR FOOT RIGHT (MIN 3 VIEWS) CLINICAL INFORMATION: increased pain; hx CMT. COMPARISON: Radiograph 5/31/2016. TECHNIQUE: 4 views of the right foot were obtained. FINDINGS: There is amputation of the first digit. Postsurgical and/or destructive changes are seen at the second and third metatarsal-phalangeal joints. There is anatomical alignment. Extensive soft tissue swelling. The base of the fifth metatarsal is intact. 1. Destructive changes are noted at the second and third metatarsophalangeal joints. 2. Previous first digit amputation. 3. Extensive soft tissue swelling. **This report has been created using voice recognition software. It may contain minor errors which are inherent in voice recognition technology. ** Final report electronically signed by Dr Monica Mcqueen on 9/28/2023 3:17 PM       LABS: No results for input(s): \"WBC\", \"HGB\", \"HCT\", \"PLT\" in the last 72 hours. No results for input(s): \"NA\", \"K\", \"CL\", \"CO2\", \"PHOS\", \"BUN\", \"CREATININE\", \"CA\" in the last 72 hours. No results for input(s): \"PROT\", \"INR\", \"APTT\" in the last 72 hours. No results for input(s): \"CKTOTAL\", \"CKMB\", \"CKMBINDEX\", \"TROPONINI\" in the last 72 hours.     Treatment:   Orders Placed This Encounter   Procedures    XR FOOT RIGHT (MIN 3 VIEWS)     Standing Status:

## 2023-09-29 ENCOUNTER — TELEPHONE (OUTPATIENT)
Dept: INTERNAL MEDICINE CLINIC | Age: 53
End: 2023-09-29

## 2023-10-02 ENCOUNTER — OFFICE VISIT (OUTPATIENT)
Dept: INTERNAL MEDICINE CLINIC | Age: 53
End: 2023-10-02

## 2023-10-02 ENCOUNTER — TELEPHONE (OUTPATIENT)
Dept: INTERNAL MEDICINE CLINIC | Age: 53
End: 2023-10-02

## 2023-10-02 VITALS
SYSTOLIC BLOOD PRESSURE: 124 MMHG | WEIGHT: 284 LBS | DIASTOLIC BLOOD PRESSURE: 74 MMHG | TEMPERATURE: 98.2 F | HEIGHT: 74 IN | BODY MASS INDEX: 36.45 KG/M2 | HEART RATE: 98 BPM

## 2023-10-02 DIAGNOSIS — Z79.4 TYPE 2 DIABETES MELLITUS WITH DIABETIC NEUROPATHY, WITH LONG-TERM CURRENT USE OF INSULIN (HCC): Primary | ICD-10-CM

## 2023-10-02 DIAGNOSIS — R17 ELEVATED BILIRUBIN: ICD-10-CM

## 2023-10-02 DIAGNOSIS — E11.40 TYPE 2 DIABETES MELLITUS WITH DIABETIC NEUROPATHY, WITH LONG-TERM CURRENT USE OF INSULIN (HCC): Primary | ICD-10-CM

## 2023-10-02 DIAGNOSIS — I10 PRIMARY HYPERTENSION: ICD-10-CM

## 2023-10-02 DIAGNOSIS — E78.2 MIXED HYPERLIPIDEMIA: ICD-10-CM

## 2023-10-02 DIAGNOSIS — K21.9 GASTROESOPHAGEAL REFLUX DISEASE WITHOUT ESOPHAGITIS: ICD-10-CM

## 2023-10-02 RX ORDER — LISINOPRIL 10 MG/1
10 TABLET ORAL DAILY
Qty: 90 TABLET | Refills: 1 | Status: SHIPPED | OUTPATIENT
Start: 2023-10-02

## 2023-10-02 RX ORDER — BLOOD SUGAR DIAGNOSTIC
STRIP MISCELLANEOUS
Qty: 100 EACH | Refills: 5 | Status: SHIPPED | OUTPATIENT
Start: 2023-10-02

## 2023-10-02 RX ORDER — ROPINIROLE 1 MG/1
TABLET, FILM COATED ORAL
Qty: 405 TABLET | Refills: 0 | Status: SHIPPED | OUTPATIENT
Start: 2023-10-02

## 2023-10-02 RX ORDER — LANCETS
EACH MISCELLANEOUS
Qty: 100 EACH | Refills: 5 | Status: SHIPPED | OUTPATIENT
Start: 2023-10-02

## 2023-10-02 NOTE — PATIENT INSTRUCTIONS
Make appt with GI in about 1-2 months after decrease Trulicity to 1.5 mg weekly - try to hydrate 6-8 glasses of water a day, decrease carbonated beverages, low fat meal around time of injection, if feel full don't over eat. Increase Lantus to 50 Units BID until you start the 1.5 mg dose then go to 60 Units BID. May want to ask if should be taking magnesium 2-3x a day to get better absorption. Clear that with nephrology.

## 2023-10-02 NOTE — PROGRESS NOTES
LANCETS MISC Use to test blood sugars 3 times daily DX:E11.40 Patient is on insulin . 100 each 5    DULoxetine (CYMBALTA) 60 MG extended release capsule Take 2 capsules by mouth daily      insulin lispro (HUMALOG) 100 UNIT/ML injection vial Takes 30-40 units with meals plus personal sliding scale      neomycin-polymyxin-dexameth (MAXITROL) 3.5-56918-0.1 ophthalmic suspension Place 1 drop into the right eye 4 times daily (Patient not taking: Reported on 10/2/2023)       No current facility-administered medications for this visit.        Allergies   Allergen Reactions    Ciprofloxacin-Ciproflox Hcl Er Other (See Comments)     Elevated creatinine    Metformin Nausea Only     Abdominal pain, diarrhea    Niacin And Related Other (See Comments)     Burning sensation, severe flushing    Tramadol Hcl      Contraindication to seizure medications    Ultram [Tramadol]      Contraindication to seizure medications    Warfarin      Very difficult to regulate in past    Other      Other reaction(s): very difficult to regulate       Social History     Socioeconomic History    Marital status:      Spouse name: Not on file    Number of children: 3    Years of education: Not on file    Highest education level: Not on file   Occupational History    Occupation: Disability since 2011   Tobacco Use    Smoking status: Never    Smokeless tobacco: Never   Vaping Use    Vaping Use: Never used   Substance and Sexual Activity    Alcohol use: No    Drug use: No    Sexual activity: Yes     Partners: Female   Other Topics Concern    Not on file   Social History Narrative    Not on file     Social Determinants of Health     Financial Resource Strain: Low Risk  (8/21/2023)    Overall Financial Resource Strain (CARDIA)     Difficulty of Paying Living Expenses: Not hard at all   Food Insecurity: No Food Insecurity (8/21/2023)    Hunger Vital Sign     Worried About Running Out of Food in the Last Year: Never true     Ran Out of Food in the Last

## 2023-10-02 NOTE — TELEPHONE ENCOUNTER
Pharmacy requesting refill of rOPINIRole (REQUIP) 1 MG tablet       Medication active on med list yes      Date of last Rx: Prescribed by another provider.           verified by BAKARI CONTRERAS      Date of last appointment 5/4/2023    Next Visit Date:  11/22/2023

## 2023-10-02 NOTE — TELEPHONE ENCOUNTER
Sent decreased dose to [address] Only    Program: Medical Group  CPA in place:  No  Recommendation Provided To: Provider: 1 via Verbally to provider  Intervention Detail: Adherence Monitorin  Intervention Accepted By: Provider: 1  Gap Closed?: No   Time Spent (min): [address], PharmD, BCPS, [facility]  Internal Medicine Clinical Pharmacist  [phone]

## 2023-10-03 ENCOUNTER — OFFICE VISIT (OUTPATIENT)
Dept: PHYSICAL MEDICINE AND REHAB | Age: 53
End: 2023-10-03
Payer: MEDICARE

## 2023-10-03 ENCOUNTER — OFFICE VISIT (OUTPATIENT)
Dept: INTERNAL MEDICINE CLINIC | Age: 53
End: 2023-10-03

## 2023-10-03 VITALS
TEMPERATURE: 97.9 F | WEIGHT: 284.4 LBS | SYSTOLIC BLOOD PRESSURE: 164 MMHG | DIASTOLIC BLOOD PRESSURE: 100 MMHG | HEART RATE: 90 BPM | BODY MASS INDEX: 36.51 KG/M2

## 2023-10-03 VITALS
DIASTOLIC BLOOD PRESSURE: 94 MMHG | HEIGHT: 74 IN | SYSTOLIC BLOOD PRESSURE: 152 MMHG | BODY MASS INDEX: 36.45 KG/M2 | WEIGHT: 284 LBS

## 2023-10-03 DIAGNOSIS — G89.4 CHRONIC PAIN SYNDROME: ICD-10-CM

## 2023-10-03 DIAGNOSIS — M14.671 CHARCOT'S JOINT OF RIGHT FOOT: ICD-10-CM

## 2023-10-03 DIAGNOSIS — M47.816 FACET HYPERTROPHY OF LUMBAR REGION: ICD-10-CM

## 2023-10-03 DIAGNOSIS — E11.40 TYPE 2 DIABETES MELLITUS WITH DIABETIC NEUROPATHY, WITH LONG-TERM CURRENT USE OF INSULIN (HCC): Primary | ICD-10-CM

## 2023-10-03 DIAGNOSIS — Z79.4 TYPE 2 DIABETES MELLITUS WITH DIABETIC NEUROPATHY, WITH LONG-TERM CURRENT USE OF INSULIN (HCC): Primary | ICD-10-CM

## 2023-10-03 DIAGNOSIS — M47.816 LUMBAR SPONDYLOSIS: ICD-10-CM

## 2023-10-03 DIAGNOSIS — M14.672 CHARCOT'S JOINT OF LEFT FOOT: Primary | ICD-10-CM

## 2023-10-03 DIAGNOSIS — G62.9 NEUROPATHY: ICD-10-CM

## 2023-10-03 DIAGNOSIS — M48.061 SPINAL STENOSIS OF LUMBAR REGION WITHOUT NEUROGENIC CLAUDICATION: ICD-10-CM

## 2023-10-03 PROCEDURE — 1036F TOBACCO NON-USER: CPT | Performed by: NURSE PRACTITIONER

## 2023-10-03 PROCEDURE — 3017F COLORECTAL CA SCREEN DOC REV: CPT | Performed by: NURSE PRACTITIONER

## 2023-10-03 PROCEDURE — G8417 CALC BMI ABV UP PARAM F/U: HCPCS | Performed by: NURSE PRACTITIONER

## 2023-10-03 PROCEDURE — G8484 FLU IMMUNIZE NO ADMIN: HCPCS | Performed by: NURSE PRACTITIONER

## 2023-10-03 PROCEDURE — 3080F DIAST BP >= 90 MM HG: CPT | Performed by: NURSE PRACTITIONER

## 2023-10-03 PROCEDURE — 3077F SYST BP >= 140 MM HG: CPT | Performed by: NURSE PRACTITIONER

## 2023-10-03 PROCEDURE — 99215 OFFICE O/P EST HI 40 MIN: CPT | Performed by: NURSE PRACTITIONER

## 2023-10-03 PROCEDURE — G8427 DOCREV CUR MEDS BY ELIG CLIN: HCPCS | Performed by: NURSE PRACTITIONER

## 2023-10-03 RX ORDER — ZOLPIDEM TARTRATE 5 MG/1
5 TABLET ORAL DAILY PRN
COMMUNITY
Start: 2023-09-22

## 2023-10-03 ASSESSMENT — PATIENT HEALTH QUESTIONNAIRE - PHQ9
6. FEELING BAD ABOUT YOURSELF - OR THAT YOU ARE A FAILURE OR HAVE LET YOURSELF OR YOUR FAMILY DOWN: NOT AT ALL
9. THOUGHTS THAT YOU WOULD BE BETTER OFF DEAD, OR OF HURTING YOURSELF: NOT AT ALL
7. TROUBLE CONCENTRATING ON THINGS, SUCH AS READING THE NEWSPAPER OR WATCHING TELEVISION: SEVERAL DAYS
SUM OF ALL RESPONSES TO PHQ QUESTIONS 1-9: 12
8. MOVING OR SPEAKING SO SLOWLY THAT OTHER PEOPLE COULD HAVE NOTICED. OR THE OPPOSITE, BEING SO FIGETY OR RESTLESS THAT YOU HAVE BEEN MOVING AROUND A LOT MORE THAN USUAL: NOT AT ALL
2. FEELING DOWN, DEPRESSED OR HOPELESS: MORE THAN HALF THE DAYS
SUM OF ALL RESPONSES TO PHQ9 QUESTIONS 1 & 2: 5
5. POOR APPETITE OR OVEREATING: NEARLY EVERY DAY
10. IF YOU CHECKED OFF ANY PROBLEMS, HOW DIFFICULT HAVE THESE PROBLEMS MADE IT FOR YOU TO DO YOUR WORK, TAKE CARE OF THINGS AT HOME, OR GET ALONG WITH OTHER PEOPLE: VERY DIFFICULT
4. FEELING TIRED OR HAVING LITTLE ENERGY: NOT AT ALL
3. TROUBLE FALLING OR STAYING ASLEEP: NEARLY EVERY DAY
1. LITTLE INTEREST OR PLEASURE IN DOING THINGS: NEARLY EVERY DAY

## 2023-10-03 NOTE — PROGRESS NOTES
Education. Last A1C: 7.0% (7/25/23); however, A1c values do not correlate with fingerstick BG for this patient. Twice daily fingerstick readings range from 149-478, with most readings in the 190s-200s. Hector no longer uses the Dexcom due to cost. We discussed trying for [facility] approval due to possibility for lower copays. At this last visit, Hector's Trulicity was increased to 3mg, but he has been experiencing limited appetite and worsened abdominal pain. Yesterday, Dr. Villalobos Severe decreased Trulicity back to 9.0TJ and increased insulin. We discussed that a holiday from [facility] may be beneficial if his symptoms do not improve. Encouraged Hector to continue his regular upper body exercise; much of his insulin resistance is likely d/t inactivity. Also encouraged Hector to try to eat 2 meals a day. He experiences nausea/vomiting if he eats breakfast/lunch; we discussed easily absorbed, high protein meals; however, Hector dislikes many of these foods, including protein drinks/shakes, eggs, and yogurt. Hector's depression has worsened, but he continues to follow with Dr. Higinio Sosa at [facility]. Filled out paperwork to receive Humalog through [employer]. Of note, Hector's BP today is elevated x2, readings were normal yesterday. He denies chest pain, shortness of breath or headache. He endorses claminess/sweatiness. Encouraged follow-up at the urgent care for evaluation, but Hector refuses. Discussed case with Dr. Corby Camara will monitor his BP at home and call in sugars tomorrow. Curriculum Area Focus: Cardiovascular risk management, Glucose checks/goals, Carbohydrate counting/meal planning, Physical activity goals, Lifestyle & healthy coping, and Diabetes distress & support  DSME PLAN:       Commit to your hand weights and stretch bands 3 times daily    2.  Try to brainstorm an easy, low carb, low fiber meal for your first meal of the day - consider a shake/drink with some protein     3 We will try to send the [facility] 3

## 2023-10-03 NOTE — PROGRESS NOTES
Functionality Assessment/Goals Worksheet     On a scale of 0 (Does not Interfere) to 10 (Completely Interferes)     1. Which number describes how during the past week pain has interfered with           the following:  A. General Activity:  10  B. Mood: 10  C. Walking Ability:  10  D. Normal Work (Includes both work outside the home and housework):  7  E. Relations with Other People:   0  F. Sleep:   10  G. Enjoyment of Life:   6    2. Patient Prefers to Take their Pain Medications:     []  On a regular basis   []  Only when necessary    []  Does not take pain medications    3. What are the Patient's Goals/Expectations for Visiting Pain Management?      []  Learn about my pain    []  Receive Medication   []  Physical Therapy     []  Treat Depression   []  Receive Injections    []  Treat Sleep   []  Deal with Anxiety and Stress   []  Treat Opoid Dependence/Addiction   []  Other:
radiation dose to as low as reasonably achievable. FINDINGS:       The lumbar vertebral bodies are normally aligned. There are no compression fractures. No pars defects are noted. No suspicious osseous lesions are present. There are no gross abnormalities within the spinal canal.       On the axial images, at T11-12 and T12-L1 and L1-L2, there is no disc herniation, canal or foraminal stenosis. At L2-3, there is a 1.9 mm bulging disc and facet hypertrophy. This causes mild canal and mild-to-moderate bilateral foraminal stenosis. At L3-4, there is a 1.8 mm bulging disc and facet hypertrophy. This causes mild canal and mild-to-moderate bilateral foraminal stenosis. At L4-5, there is a 2.3 mm bulging disc and facet hypertrophy. This causes mild canal and mild-to-moderate bilateral foraminal stenosis. At L5-S1, there is a 2.2 mm bulging disc and a sclerotic left facet joint . This causes mild canal and mild-to-moderate bilateral foraminal stenosis. There is degenerative change involving the sacroiliac joints bilaterally. .       There is a filter within the inferior vena cava. There is atherosclerotic calcification in the abdominal aorta. Impression       1. Degenerative changes resulting in mild canal and mild-to-moderate bilateral foraminal stenosis at L2-3, L3-4, L4-5 and L5-S1.   2. Degenerative change involving the sacroiliac joints bilaterally. 3. Filter within the inferior vena cava. 4. Atherosclerotic calcification in the abdominal aorta. Past Medical History:   Diagnosis Date    Abnormal thyroid biopsy     s/p left hemithyroidectomy 7/2756 -benign follicular adenoma    Acid reflux     Anemia     resolved - previously seen by Dr. Arelis Chang (due to enlarged spleen)    Anesthesia     seizures with brain surgery due to blood sugar got really high    Bile reflux gastritis     per EGD 11/16/21 - Dr. Kinjal Hassan - to start Carafate.     Bipolar

## 2023-10-03 NOTE — PATIENT INSTRUCTIONS
Commit to your hand weights and stretch bands 3 times daily    2. Try to brainstorm an easy, low carb, low fiber meal for your first meal of the day - consider a shake/drink with some protein     3 We will try to send the [place] 3 prescription to [facility] for you - check if your copay is lower    4 Watch for [address] for 2024 in the mail - please complete patient portion and bring into the office.  If you do not receive anything by mid-December, please call the office so that we can assist with your reapplication.    -pharmacist will work on getting your Humalog renewed for this year

## 2023-10-05 ENCOUNTER — HOSPITAL ENCOUNTER (EMERGENCY)
Age: 53
Discharge: HOME | End: 2023-10-05
Attending: EMERGENCY MEDICINE
Payer: MEDICARE

## 2023-10-05 ENCOUNTER — APPOINTMENT (OUTPATIENT)
Dept: GENERAL RADIOLOGY | Age: 53
End: 2023-10-05
Payer: MEDICARE

## 2023-10-05 ENCOUNTER — TELEPHONE (OUTPATIENT)
Dept: INTERNAL MEDICINE CLINIC | Age: 53
End: 2023-10-05

## 2023-10-05 VITALS
DIASTOLIC BLOOD PRESSURE: 93 MMHG | HEART RATE: 82 BPM | TEMPERATURE: 97.9 F | HEIGHT: 74 IN | WEIGHT: 280 LBS | SYSTOLIC BLOOD PRESSURE: 161 MMHG | OXYGEN SATURATION: 97 % | BODY MASS INDEX: 35.94 KG/M2 | RESPIRATION RATE: 16 BRPM

## 2023-10-05 DIAGNOSIS — I10 HYPERTENSION, UNSPECIFIED TYPE: Primary | ICD-10-CM

## 2023-10-05 LAB
ALBUMIN SERPL BCG-MCNC: 4.3 G/DL (ref 3.5–5.1)
ALP SERPL-CCNC: 102 U/L (ref 38–126)
ALT SERPL W/O P-5'-P-CCNC: 37 U/L (ref 11–66)
ANION GAP SERPL CALC-SCNC: 7 MEQ/L (ref 8–16)
AST SERPL-CCNC: 22 U/L (ref 5–40)
BACTERIA URNS QL MICRO: ABNORMAL /HPF
BASOPHILS ABSOLUTE: 0 THOU/MM3 (ref 0–0.1)
BASOPHILS NFR BLD AUTO: 0.3 %
BILIRUB SERPL-MCNC: 1.4 MG/DL (ref 0.3–1.2)
BILIRUB UR QL STRIP.AUTO: NEGATIVE
BUN SERPL-MCNC: 13 MG/DL (ref 7–22)
CALCIUM SERPL-MCNC: 9.2 MG/DL (ref 8.5–10.5)
CASTS #/AREA URNS LPF: ABNORMAL /LPF
CASTS 2: ABNORMAL /LPF
CHARACTER UR: CLEAR
CHLORIDE SERPL-SCNC: 104 MEQ/L (ref 98–111)
CO2 SERPL-SCNC: 28 MEQ/L (ref 23–33)
COLOR: YELLOW
CREAT SERPL-MCNC: 0.8 MG/DL (ref 0.4–1.2)
CRYSTALS URNS MICRO: ABNORMAL
D DIMER PPP IA.FEU-MCNC: 327 NG/ML FEU (ref 0–500)
DEPRECATED RDW RBC AUTO: 39.6 FL (ref 35–45)
EOSINOPHIL NFR BLD AUTO: 3.8 %
EOSINOPHILS ABSOLUTE: 0.2 THOU/MM3 (ref 0–0.4)
EPITHELIAL CELLS, UA: ABNORMAL /HPF
ERYTHROCYTE [DISTWIDTH] IN BLOOD BY AUTOMATED COUNT: 13 % (ref 11.5–14.5)
GFR SERPL CREATININE-BSD FRML MDRD: > 60 ML/MIN/1.73M2
GLUCOSE SERPL-MCNC: 218 MG/DL (ref 70–108)
GLUCOSE UR QL STRIP.AUTO: 100 MG/DL
HCT VFR BLD AUTO: 41.6 % (ref 42–52)
HGB BLD-MCNC: 14.8 GM/DL (ref 14–18)
HGB UR QL STRIP.AUTO: NEGATIVE
IMM GRANULOCYTES # BLD AUTO: 0.03 THOU/MM3 (ref 0–0.07)
IMM GRANULOCYTES NFR BLD AUTO: 0.5 %
KETONES UR QL STRIP.AUTO: NEGATIVE
LYMPHOCYTES ABSOLUTE: 1.7 THOU/MM3 (ref 1–4.8)
LYMPHOCYTES NFR BLD AUTO: 29.4 %
MCH RBC QN AUTO: 30.5 PG (ref 26–33)
MCHC RBC AUTO-ENTMCNC: 35.6 GM/DL (ref 32.2–35.5)
MCV RBC AUTO: 85.6 FL (ref 80–94)
MISCELLANEOUS 2: ABNORMAL
MONOCYTES ABSOLUTE: 0.5 THOU/MM3 (ref 0.4–1.3)
MONOCYTES NFR BLD AUTO: 8.3 %
NEUTROPHILS NFR BLD AUTO: 57.7 %
NITRITE UR QL STRIP: NEGATIVE
NRBC BLD AUTO-RTO: 0 /100 WBC
OSMOLALITY SERPL CALC.SUM OF ELEC: 284.3 MOSMOL/KG (ref 275–300)
PH UR STRIP.AUTO: 5 [PH] (ref 5–9)
PLATELET # BLD AUTO: 210 THOU/MM3 (ref 130–400)
PMV BLD AUTO: 10.3 FL (ref 9.4–12.4)
POTASSIUM SERPL-SCNC: 4.2 MEQ/L (ref 3.5–5.2)
PROT SERPL-MCNC: 6.9 G/DL (ref 6.1–8)
PROT UR STRIP.AUTO-MCNC: 30 MG/DL
RBC # BLD AUTO: 4.86 MILL/MM3 (ref 4.7–6.1)
RBC URINE: ABNORMAL /HPF
RENAL EPI CELLS #/AREA URNS HPF: ABNORMAL /[HPF]
SEGMENTED NEUTROPHILS ABSOLUTE COUNT: 3.3 THOU/MM3 (ref 1.8–7.7)
SODIUM SERPL-SCNC: 139 MEQ/L (ref 135–145)
SP GR UR REFRACT.AUTO: 1.02 (ref 1–1.03)
TROPONIN, HIGH SENSITIVITY: 13 NG/L (ref 0–12)
TROPONIN, HIGH SENSITIVITY: 14 NG/L (ref 0–12)
UROBILINOGEN, URINE: 0.2 EU/DL (ref 0–1)
WBC # BLD AUTO: 5.8 THOU/MM3 (ref 4.8–10.8)
WBC #/AREA URNS HPF: ABNORMAL /HPF
WBC #/AREA URNS HPF: NEGATIVE /[HPF]
YEAST LIKE FUNGI URNS QL MICRO: ABNORMAL

## 2023-10-05 PROCEDURE — 71045 X-RAY EXAM CHEST 1 VIEW: CPT

## 2023-10-05 PROCEDURE — 81001 URINALYSIS AUTO W/SCOPE: CPT

## 2023-10-05 PROCEDURE — 99285 EMERGENCY DEPT VISIT HI MDM: CPT

## 2023-10-05 PROCEDURE — 85379 FIBRIN DEGRADATION QUANT: CPT

## 2023-10-05 PROCEDURE — 93010 ELECTROCARDIOGRAM REPORT: CPT | Performed by: INTERNAL MEDICINE

## 2023-10-05 PROCEDURE — 93005 ELECTROCARDIOGRAM TRACING: CPT

## 2023-10-05 PROCEDURE — 85025 COMPLETE CBC W/AUTO DIFF WBC: CPT

## 2023-10-05 PROCEDURE — 84484 ASSAY OF TROPONIN QUANT: CPT

## 2023-10-05 PROCEDURE — 36415 COLL VENOUS BLD VENIPUNCTURE: CPT

## 2023-10-05 PROCEDURE — 80053 COMPREHEN METABOLIC PANEL: CPT

## 2023-10-05 ASSESSMENT — PAIN - FUNCTIONAL ASSESSMENT: PAIN_FUNCTIONAL_ASSESSMENT: 0-10

## 2023-10-05 ASSESSMENT — PAIN DESCRIPTION - LOCATION: LOCATION: FOOT

## 2023-10-05 ASSESSMENT — HEART SCORE: ECG: 0

## 2023-10-05 ASSESSMENT — PAIN DESCRIPTION - PAIN TYPE: TYPE: ACUTE PAIN

## 2023-10-05 ASSESSMENT — PAIN DESCRIPTION - ORIENTATION: ORIENTATION: LEFT;RIGHT

## 2023-10-05 ASSESSMENT — PAIN SCALES - GENERAL: PAINLEVEL_OUTOF10: 8

## 2023-10-05 NOTE — ED TRIAGE NOTES
Pt presents to the ED with c/o high blood pressure- states he was seen in the office on Tuesday and they wanted to send him in at that time, but he preferred office followup- Pt was seen in the the office today and sent directly to the ED- he states he has foot pain but denies other pain- Alert, skin warm and dry, respirations even and unlabored

## 2023-10-05 NOTE — ED NOTES
Pt resting on cart- Assessment unchanged at this time- no questions or concerns noted     Sadia Sams RN  10/05/23 1965

## 2023-10-05 NOTE — ED NOTES
Pt resting quietly on the cart- Assessment unchanged- no questions or concerns noted     Aisha Javier RN  10/05/23 2052

## 2023-10-05 NOTE — ED PROVIDER NOTES

## 2023-10-05 NOTE — DISCHARGE INSTRUCTIONS
You were seen and evaluated emergency department today for high blood pressure. Your blood pressure remained stable throughout your emergency department stay, your labs were largely reassuring. Follow-up with your primary care physician for further evaluation of blood pressure and management. Return to emergency department anytime for acute or worrisome changes.

## 2023-10-05 NOTE — TELEPHONE ENCOUNTER
Patient called in with blood pressures. 10/3  208/137  211/122  202/136  211/154    10/4  233/148  234/162  180/100  161/94    10/5  205/114  179/118    Patient instructed to go to ER. Patient states he will go in a little bit.

## 2023-10-05 NOTE — ED NOTES
Pt ambulated in the cruz-tolerated well-repeat vital signs completed     Cyrus Karimi RN  10/05/23 6910

## 2023-10-06 ENCOUNTER — TELEPHONE (OUTPATIENT)
Dept: INTERNAL MEDICINE CLINIC | Age: 53
End: 2023-10-06

## 2023-10-07 LAB
EKG ATRIAL RATE: 81 BPM
EKG P AXIS: 27 DEGREES
EKG P-R INTERVAL: 190 MS
EKG Q-T INTERVAL: 368 MS
EKG QRS DURATION: 80 MS
EKG QTC CALCULATION (BAZETT): 427 MS
EKG R AXIS: 16 DEGREES
EKG T AXIS: 39 DEGREES
EKG VENTRICULAR RATE: 81 BPM

## 2023-10-09 ENCOUNTER — NURSE ONLY (OUTPATIENT)
Dept: LAB | Age: 53
End: 2023-10-09

## 2023-10-09 ENCOUNTER — TELEPHONE (OUTPATIENT)
Dept: INTERNAL MEDICINE CLINIC | Age: 53
End: 2023-10-09

## 2023-10-09 DIAGNOSIS — R17 ELEVATED BILIRUBIN: ICD-10-CM

## 2023-10-09 DIAGNOSIS — E11.40 TYPE 2 DIABETES MELLITUS WITH DIABETIC NEUROPATHY, WITH LONG-TERM CURRENT USE OF INSULIN (HCC): ICD-10-CM

## 2023-10-09 DIAGNOSIS — Z79.4 TYPE 2 DIABETES MELLITUS WITH DIABETIC NEUROPATHY, WITH LONG-TERM CURRENT USE OF INSULIN (HCC): ICD-10-CM

## 2023-10-09 DIAGNOSIS — I10 PRIMARY HYPERTENSION: ICD-10-CM

## 2023-10-09 LAB
ALBUMIN SERPL BCG-MCNC: 4.3 G/DL (ref 3.5–5.1)
ALP SERPL-CCNC: 99 U/L (ref 38–126)
ALT SERPL W/O P-5'-P-CCNC: 39 U/L (ref 11–66)
ANION GAP SERPL CALC-SCNC: 9 MEQ/L (ref 8–16)
AST SERPL-CCNC: 26 U/L (ref 5–40)
BASOPHILS ABSOLUTE: 0 THOU/MM3 (ref 0–0.1)
BASOPHILS NFR BLD AUTO: 0.6 %
BILIRUB SERPL-MCNC: 1.2 MG/DL (ref 0.3–1.2)
BUN SERPL-MCNC: 12 MG/DL (ref 7–22)
CALCIUM SERPL-MCNC: 9 MG/DL (ref 8.5–10.5)
CHLORIDE SERPL-SCNC: 105 MEQ/L (ref 98–111)
CO2 SERPL-SCNC: 30 MEQ/L (ref 23–33)
CREAT SERPL-MCNC: 1 MG/DL (ref 0.4–1.2)
DEPRECATED RDW RBC AUTO: 42.8 FL (ref 35–45)
EOSINOPHIL NFR BLD AUTO: 4.1 %
EOSINOPHILS ABSOLUTE: 0.3 THOU/MM3 (ref 0–0.4)
ERYTHROCYTE [DISTWIDTH] IN BLOOD BY AUTOMATED COUNT: 13.3 % (ref 11.5–14.5)
GFR SERPL CREATININE-BSD FRML MDRD: > 60 ML/MIN/1.73M2
GLUCOSE SERPL-MCNC: 209 MG/DL (ref 70–108)
HCT VFR BLD AUTO: 44.2 % (ref 42–52)
HGB BLD-MCNC: 15.5 GM/DL (ref 14–18)
IMM GRANULOCYTES # BLD AUTO: 0.05 THOU/MM3 (ref 0–0.07)
IMM GRANULOCYTES NFR BLD AUTO: 0.7 %
LYMPHOCYTES ABSOLUTE: 2 THOU/MM3 (ref 1–4.8)
LYMPHOCYTES NFR BLD AUTO: 29.6 %
MCH RBC QN AUTO: 31.2 PG (ref 26–33)
MCHC RBC AUTO-ENTMCNC: 35.1 GM/DL (ref 32.2–35.5)
MCV RBC AUTO: 88.9 FL (ref 80–94)
MONOCYTES ABSOLUTE: 0.5 THOU/MM3 (ref 0.4–1.3)
MONOCYTES NFR BLD AUTO: 6.6 %
NEUTROPHILS NFR BLD AUTO: 58.4 %
NRBC BLD AUTO-RTO: 0 /100 WBC
PLATELET # BLD AUTO: 234 THOU/MM3 (ref 130–400)
PMV BLD AUTO: 10.2 FL (ref 9.4–12.4)
POTASSIUM SERPL-SCNC: 4.3 MEQ/L (ref 3.5–5.2)
PROT SERPL-MCNC: 6.9 G/DL (ref 6.1–8)
RBC # BLD AUTO: 4.97 MILL/MM3 (ref 4.7–6.1)
SEGMENTED NEUTROPHILS ABSOLUTE COUNT: 4 THOU/MM3 (ref 1.8–7.7)
SODIUM SERPL-SCNC: 144 MEQ/L (ref 135–145)
WBC # BLD AUTO: 6.9 THOU/MM3 (ref 4.8–10.8)

## 2023-10-09 NOTE — TELEPHONE ENCOUNTER
Patient called in BP readings when you were off and he was recommended to go to ER . Patient states that they did not do anything for him in the ER other then tell him the family doctor needs to adjust his medications . Appointment scheduled for 10/10 for follow-up.

## 2023-10-10 ENCOUNTER — OFFICE VISIT (OUTPATIENT)
Dept: INTERNAL MEDICINE CLINIC | Age: 53
End: 2023-10-10
Payer: MEDICARE

## 2023-10-10 VITALS
SYSTOLIC BLOOD PRESSURE: 130 MMHG | HEIGHT: 74 IN | WEIGHT: 286 LBS | HEART RATE: 80 BPM | OXYGEN SATURATION: 95 % | BODY MASS INDEX: 36.7 KG/M2 | DIASTOLIC BLOOD PRESSURE: 82 MMHG

## 2023-10-10 DIAGNOSIS — I10 HYPERTENSION, UNSPECIFIED TYPE: Primary | ICD-10-CM

## 2023-10-10 DIAGNOSIS — R10.9 BILATERAL FLANK PAIN: ICD-10-CM

## 2023-10-10 PROCEDURE — 99214 OFFICE O/P EST MOD 30 MIN: CPT | Performed by: INTERNAL MEDICINE

## 2023-10-10 PROCEDURE — G8417 CALC BMI ABV UP PARAM F/U: HCPCS | Performed by: INTERNAL MEDICINE

## 2023-10-10 PROCEDURE — 3078F DIAST BP <80 MM HG: CPT | Performed by: INTERNAL MEDICINE

## 2023-10-10 PROCEDURE — 1036F TOBACCO NON-USER: CPT | Performed by: INTERNAL MEDICINE

## 2023-10-10 PROCEDURE — G8484 FLU IMMUNIZE NO ADMIN: HCPCS | Performed by: INTERNAL MEDICINE

## 2023-10-10 PROCEDURE — 3074F SYST BP LT 130 MM HG: CPT | Performed by: INTERNAL MEDICINE

## 2023-10-10 PROCEDURE — 3017F COLORECTAL CA SCREEN DOC REV: CPT | Performed by: INTERNAL MEDICINE

## 2023-10-10 PROCEDURE — G8427 DOCREV CUR MEDS BY ELIG CLIN: HCPCS | Performed by: INTERNAL MEDICINE

## 2023-10-10 RX ORDER — DEXAMETHASONE 1 MG
1 TABLET ORAL SEE ADMIN INSTRUCTIONS
Qty: 1 TABLET | Refills: 0 | Status: SHIPPED | OUTPATIENT
Start: 2023-10-10 | End: 2023-10-11

## 2023-10-10 RX ORDER — LANOLIN ALCOHOL/MO/W.PET/CERES
800 CREAM (GRAM) TOPICAL
COMMUNITY
Start: 2023-10-02

## 2023-10-10 RX ORDER — LISINOPRIL 20 MG/1
20 TABLET ORAL DAILY
Qty: 30 TABLET | Refills: 5 | Status: SHIPPED | OUTPATIENT
Start: 2023-10-10

## 2023-10-10 NOTE — PROGRESS NOTES
1970    Chief Complaint   Patient presents with    Hypertension     Light headed    Oliguria       Pt is a 48 y.o. male who presents for a follow up visit from ED for hypertension. Since last week - SBP has increased significantly - 170-190's, some 120-130's. No flushing. No pain/anxiety. Increase lisinopril 20 mg daily. Will do workup for secondary causes of hypertension. I have suspected Cushing disease. He never followed up with endocrinology to finish work up. Will repeat studies now. He has bilateral flank pain and decreased urination x 3 weeks. Decreased urine output. Urinates 3x a day, and previously urinated every hour. Negative UA in ED 10/5/23 - mild protein and glucose but no blood or WBC. Check renal u/s plus vascular study to rule out renal artery stenosis. Look at PVR. Past Medical History:   Diagnosis Date    Abnormal thyroid biopsy     s/p left hemithyroidectomy 2/0939 -benign follicular adenoma    Acid reflux     Anemia     resolved - previously seen by Dr. Corine Crawford (due to enlarged spleen)    Anesthesia     seizures with brain surgery due to blood sugar got really high    Bile reflux gastritis     per EGD 11/16/21 - Dr. Janki Landers - to start Carafate. Bipolar disorder ([address])     Cancer ([address])     Chest pain     previously seeing Staci Segura cardiologist, now seeing Dr. Selma Godinez (LAD bridging on cath 4/2016), no CAD per [facility] 6/2022. Chronic back pain     Chronic bronchitis ([address])     Dr. Ruth Greenberg    Colon polyp 08/30/2016    Depression     seeing [facility]    Esophageal abnormality     nodule     Fatty liver disease, nonalcoholic     U/S 83/[facility]    Follicular adenoma of thyroid gland 01/15/2021    s/p lobectomy    Furuncle     legs    History of blood transfusion     History of Doppler ultrasound 05/29/2011    No hemodynamically significant carotid stenosis is identified. A thyroid nodule on each side.  Dedicated ultrasound of thyroid gland is suggested to further evaluate if

## 2023-10-12 ENCOUNTER — TELEPHONE (OUTPATIENT)
Dept: INTERNAL MEDICINE CLINIC | Age: 53
End: 2023-10-12

## 2023-10-12 NOTE — TELEPHONE ENCOUNTER
Spoke with patient and he is still having BP that are elevated . Patient states that he is now having some symptoms of headache and feels hot when his pressure is up . Patient's  Lisinopril was increased to 20 mg daily on 10/10 but does not seem to be making much of a difference . Please advise . Instructed patient that I would send message but that you are not in on Friday so he should go to ER if BP significantly elevated and he is symptomatic over the weekend . Also requested that patient bring his BP readings and BP monitor to appointment with Kellie Kyle 10/18 . Patient scheduled for follow up in 10/23 . Hector said he would also notify his kidney doctor about his elevated BP. Patient reported a home BP as high as 180/130 .

## 2023-10-13 NOTE — TELEPHONE ENCOUNTER
I tried to reach patient today - no answer - left message to increase lisinopril to 40 mg daily (could take 20 mg BID if would like to split dose). Told him to go to ED if headache with SBP >180 or DBP >115. Told him to call office with any questions. Holly- please call on Monday to get up date on what has happened this weekend/BP numbers on lisinopril 40 mg daily over the weekend.

## 2023-10-16 ENCOUNTER — HOSPITAL ENCOUNTER (OUTPATIENT)
Dept: ULTRASOUND IMAGING | Age: 53
Discharge: HOME | End: 2023-10-16
Attending: INTERNAL MEDICINE
Payer: MEDICARE

## 2023-10-16 DIAGNOSIS — R10.9 BILATERAL FLANK PAIN: ICD-10-CM

## 2023-10-16 DIAGNOSIS — I10 HYPERTENSION, UNSPECIFIED TYPE: ICD-10-CM

## 2023-10-16 PROCEDURE — 76770 US EXAM ABDO BACK WALL COMP: CPT

## 2023-10-16 PROCEDURE — 93975 VASCULAR STUDY: CPT

## 2023-10-18 ENCOUNTER — OFFICE VISIT (OUTPATIENT)
Dept: INTERNAL MEDICINE CLINIC | Age: 53
End: 2023-10-18

## 2023-10-18 VITALS
DIASTOLIC BLOOD PRESSURE: 98 MMHG | HEART RATE: 86 BPM | TEMPERATURE: 98.1 F | WEIGHT: 284.2 LBS | SYSTOLIC BLOOD PRESSURE: 162 MMHG | BODY MASS INDEX: 36.49 KG/M2

## 2023-10-18 DIAGNOSIS — E11.69 TYPE 2 DIABETES MELLITUS WITH OTHER SPECIFIED COMPLICATION, WITH LONG-TERM CURRENT USE OF INSULIN (HCC): Primary | ICD-10-CM

## 2023-10-18 DIAGNOSIS — Z79.4 TYPE 2 DIABETES MELLITUS WITH OTHER SPECIFIED COMPLICATION, WITH LONG-TERM CURRENT USE OF INSULIN (HCC): Primary | ICD-10-CM

## 2023-10-18 NOTE — PATIENT INSTRUCTIONS
Try to call [facility] to request a humalog refill ONDINA Leo): Call: [phone]    2. Use the group 3 correction scale:     GROUP 3    Blood Sugar Dose fast acting insulin    None   140-199 3 unit   200-249 6 units   250-299 9 units   300-349 12 units   350-399 15 units   400 and above 18 units     3. 2. Try to brainstorm an easy, low carb, low fiber meal for your first meal of the day - consider a shake/drink with some protein      4. Good job increasing your exercise- keep working on this.      Call to reaccess your MyChart: [phone]

## 2023-10-19 ENCOUNTER — NURSE ONLY (OUTPATIENT)
Dept: LAB | Age: 53
End: 2023-10-19

## 2023-10-19 DIAGNOSIS — I10 HYPERTENSION, UNSPECIFIED TYPE: ICD-10-CM

## 2023-10-19 LAB
CORTIS SERPL-MCNC: 0.52 UG/DL
CORTISOL COLLECTION INFO: NORMAL

## 2023-10-19 NOTE — TELEPHONE ENCOUNTER
Spoke with patient and he did receive the message to increase Lisinopril . Patient states that the increase dose on Lisinopril has not really done anything and BP is still running high. Patient claims that BP is averaging 200/130-140s . Instructed patient if BP that high he needs to go back to ER  . Patient is using a wrist cuff to check his BP . Patient is doing the 24 hours and will return today but he states that he is having decreased urine output. Patient sees Dr. Kathryn Ellis tomorrow and is scheduled to see you on Monday .

## 2023-10-20 LAB
HOURS COLLECTED: 24 HRS
SODIUM 24H UR-SRATE: 475 MEQ/24HR (ref 75–200)
SODIUM UR-SCNC: 95 MEQ/L
URINE VOLUME, 24 HOUR: 5000 ML

## 2023-10-22 LAB — CATECHOLAMINES TOTAL 24 HOUR URINE: NORMAL

## 2023-10-23 ENCOUNTER — OFFICE VISIT (OUTPATIENT)
Dept: INTERNAL MEDICINE CLINIC | Age: 53
End: 2023-10-23

## 2023-10-23 VITALS
BODY MASS INDEX: 37.04 KG/M2 | WEIGHT: 288.6 LBS | TEMPERATURE: 98.2 F | HEIGHT: 74 IN | SYSTOLIC BLOOD PRESSURE: 134 MMHG | DIASTOLIC BLOOD PRESSURE: 76 MMHG | HEART RATE: 88 BPM

## 2023-10-23 DIAGNOSIS — E11.40 TYPE 2 DIABETES MELLITUS WITH DIABETIC NEUROPATHY, WITH LONG-TERM CURRENT USE OF INSULIN (HCC): ICD-10-CM

## 2023-10-23 DIAGNOSIS — R33.9 URINARY RETENTION: ICD-10-CM

## 2023-10-23 DIAGNOSIS — Z79.4 TYPE 2 DIABETES MELLITUS WITH DIABETIC NEUROPATHY, WITH LONG-TERM CURRENT USE OF INSULIN (HCC): ICD-10-CM

## 2023-10-23 DIAGNOSIS — I10 HYPERTENSION, UNSPECIFIED TYPE: Primary | ICD-10-CM

## 2023-10-23 RX ORDER — SPIRONOLACTONE 25 MG/1
1 TABLET ORAL
COMMUNITY

## 2023-10-23 RX ORDER — TAMSULOSIN HYDROCHLORIDE 0.4 MG/1
0.4 CAPSULE ORAL DAILY
Qty: 90 CAPSULE | Refills: 1 | Status: SHIPPED | OUTPATIENT
Start: 2023-10-23

## 2023-10-23 RX ORDER — CLONIDINE HYDROCHLORIDE 0.1 MG/1
TABLET ORAL
COMMUNITY
Start: 2023-10-20

## 2023-10-23 NOTE — PATIENT INSTRUCTIONS
Cut back on pop, processed foods to limit salt. Start your aldactone but make sure drinking enough water - 6-8 glasses of water a day. Start Flomax at bedtime to help empty bladder better. Stop Trulicity with abdominal pain - call in FSBS in 2 weeks to see if need to adjust insulin.

## 2023-10-23 NOTE — PROGRESS NOTES
tablet Take 1 tablet by mouth 2 times daily for 60 days. Max Daily Amount: 100 mg 60 tablet 1    gabapentin (NEURONTIN) 600 MG tablet Take 1 tablet by mouth 3 times daily for 90 days. 90 tablet 2    insulin glargine (BASAGLAR KWIKPEN) 100 UNIT/ML injection pen Inject 45 Units into the skin 2 times daily (Patient taking differently: Inject 65 Units into the skin 2 times daily) 30 mL 2    pantoprazole (PROTONIX) 40 MG tablet Take 1 tablet by mouth 2 times daily 180 tablet 1    vitamin D (ERGOCALCIFEROL) 1.25 MG (43370 UT) CAPS capsule Take 1 capsule by mouth once a week 12 capsule 1    rosuvastatin (CRESTOR) 5 MG tablet TAKE 1 TABLET BY MOUTH EVERY DAY 90 tablet 1    levETIRAcetam (KEPPRA) 1000 MG tablet TAKE 2 TABLETS BY MOUTH TWO TIMES A  tablet 1    lidocaine (LIDODERM) 5 % Place 1 patch onto the skin daily      LORazepam (ATIVAN) 1 MG tablet Take 1 tablet by mouth as needed. promethazine (PHENERGAN) 12.5 MG tablet Take 1-2 tablets by mouth every 8 hours as needed for Nausea 60 tablet 1    ARIPiprazole (ABILIFY) 2 MG tablet Take 1 tablet by mouth daily      amLODIPine (NORVASC) 10 MG tablet Take 1 tablet by mouth daily      DULoxetine (CYMBALTA) 60 MG extended release capsule Take 2 capsules by mouth daily      insulin lispro (HUMALOG) 100 UNIT/ML injection vial Takes 30-40 units with meals plus personal sliding scale      cloNIDine (CATAPRES) 0.1 MG tablet  (Patient not taking: Reported on 10/23/2023)      spironolactone (ALDACTONE) 25 MG tablet Take 1 tablet by mouth Every Day (Patient not taking: Reported on 10/23/2023)       No current facility-administered medications for this visit.        Allergies   Allergen Reactions    Ciprofloxacin-Ciproflox Hcl Er Other (See Comments)     Elevated creatinine    Metformin Nausea Only     Abdominal pain, diarrhea    Niacin And Related Other (See Comments)     Burning sensation, severe flushing    Tramadol Hcl      Contraindication to seizure medications

## 2023-10-26 LAB
CATECHOLAMINES TOTAL 24 HOUR URINE: NORMAL
CORTISOL (UR), FREE: NORMAL
METANEPHRINES TOTAL URINE: NORMAL

## 2023-10-31 ENCOUNTER — OFFICE VISIT (OUTPATIENT)
Dept: PHYSICAL MEDICINE AND REHAB | Age: 53
End: 2023-10-31
Payer: MEDICARE

## 2023-10-31 VITALS
WEIGHT: 288 LBS | DIASTOLIC BLOOD PRESSURE: 74 MMHG | HEIGHT: 74 IN | SYSTOLIC BLOOD PRESSURE: 122 MMHG | BODY MASS INDEX: 36.96 KG/M2

## 2023-10-31 DIAGNOSIS — G62.9 NEUROPATHY: ICD-10-CM

## 2023-10-31 DIAGNOSIS — M14.672 CHARCOT'S JOINT OF LEFT FOOT: Primary | ICD-10-CM

## 2023-10-31 DIAGNOSIS — M47.816 FACET HYPERTROPHY OF LUMBAR REGION: ICD-10-CM

## 2023-10-31 DIAGNOSIS — M14.671 CHARCOT'S JOINT OF RIGHT FOOT: ICD-10-CM

## 2023-10-31 DIAGNOSIS — G89.4 CHRONIC PAIN SYNDROME: ICD-10-CM

## 2023-10-31 DIAGNOSIS — M48.061 SPINAL STENOSIS OF LUMBAR REGION WITHOUT NEUROGENIC CLAUDICATION: ICD-10-CM

## 2023-10-31 DIAGNOSIS — M47.816 LUMBAR SPONDYLOSIS: ICD-10-CM

## 2023-10-31 DIAGNOSIS — M53.3 SI (SACROILIAC) JOINT DYSFUNCTION: ICD-10-CM

## 2023-10-31 DIAGNOSIS — M53.3 SACROILIAC JOINT PAIN: ICD-10-CM

## 2023-10-31 PROCEDURE — G8417 CALC BMI ABV UP PARAM F/U: HCPCS | Performed by: NURSE PRACTITIONER

## 2023-10-31 PROCEDURE — 3074F SYST BP LT 130 MM HG: CPT | Performed by: NURSE PRACTITIONER

## 2023-10-31 PROCEDURE — 1036F TOBACCO NON-USER: CPT | Performed by: NURSE PRACTITIONER

## 2023-10-31 PROCEDURE — G8427 DOCREV CUR MEDS BY ELIG CLIN: HCPCS | Performed by: NURSE PRACTITIONER

## 2023-10-31 PROCEDURE — G8484 FLU IMMUNIZE NO ADMIN: HCPCS | Performed by: NURSE PRACTITIONER

## 2023-10-31 PROCEDURE — 99214 OFFICE O/P EST MOD 30 MIN: CPT | Performed by: NURSE PRACTITIONER

## 2023-10-31 PROCEDURE — 3078F DIAST BP <80 MM HG: CPT | Performed by: NURSE PRACTITIONER

## 2023-10-31 PROCEDURE — 3017F COLORECTAL CA SCREEN DOC REV: CPT | Performed by: NURSE PRACTITIONER

## 2023-10-31 NOTE — PROGRESS NOTES
Functionality Assessment/Goals Worksheet     On a scale of 0 (Does not Interfere) to 10 (Completely Interferes)     1. Which number describes how during the past week pain has interfered with           the following:  A. General Activity:  8  B. Mood: 7  C. Walking Ability:  10  D. Normal Work (Includes both work outside the home and housework):  8  E. Relations with Other People:   8  F. Sleep:   8  G. Enjoyment of Life:   10    2. Patient Prefers to Take their Pain Medications:     []  On a regular basis   []  Only when necessary    [x]  Does not take pain medications    3. What are the Patient's Goals/Expectations for Visiting Pain Management?      []  Learn about my pain    []  Receive Medication   []  Physical Therapy     []  Treat Depression   []  Receive Injections    []  Treat Sleep   []  Deal with Anxiety and Stress   []  Treat Opoid Dependence/Addiction   []  Other:

## 2023-10-31 NOTE — PROGRESS NOTES
Chronic Pain/PM&R Clinic Note     Encounter Date: 10/31/23    Subjective:   Chief Complaint:   Chief Complaint   Patient presents with    Follow-up     Pt would like to discuss next options. History of Present Illness:   Shavon Roth is a 48 y.o. male seen in the clinic initially on 09/20/2022 upon request from Dr Denia Aponte for his history of low back pain. He has a personal medical history of DVT, HTN, liver disease, IBS, GERD, DMT2, MISTI, depression, suicide attempt, bipolar, seizures, neuropathy, charcot foot. Patient presents today with his left foot in a cast, utilizing crutches. He reports that he is seeing Dr. Valentin Waldrop at [facility], had surgery on bilateral feet and leg, left foot is still not healing well continues in a cast with plans for a bone stimulator to be put on today. He reports he he was told he could be in this cast for up to 1 year. He has complaints of bilateral low back pain, denies any radiation into the buttocks, thighs. He does report he has numbness, pins/needles in bilateral legs and knees down. That has been occurring for 10 years. He reports low back pain has been occurring for 3 months. Denies any accident, injury, falls that caused his pain. He describes the pain as a ache, stabbing. He states standing, moving around makes the pain worse. Nothing helps alleviate it. He has tried heat without relief. He cannot take over-the-counter NSAIDs or Tylenol due to stomach issues, blood sugar issues as well as liver disease. He denies any weakness, states he has had falls in the past.  He denies any loss of bowel or bladder control. He has does not try any home exercises, has not tried any formal physical therapy for the low back. He reports he has had epidurals into his back in the past at [facility], but it was years and years ago. He states it did help at that time.  Pain scale : at worst pain is 9/10, at

## 2023-11-07 ENCOUNTER — NURSE ONLY (OUTPATIENT)
Dept: INTERNAL MEDICINE CLINIC | Age: 53
End: 2023-11-07

## 2023-11-07 NOTE — PROGRESS NOTES
Hector present for initiation of [employer] personal CGM  Referring provider: Dr. Marylee Fried 3 sensor inserted on back of Right Arm. Sensor initiated via [address] 3 . Reviewed interstitial glucose versus capillary glucose. Instructed on  setting and set up the appropriate alarms. Time and date set on   80 for Low Alarm   250 for High Alarm. Explained the arrows and the meaning of their blood sugar. Patient instructed the system is water resistant and can shower. To reinforce sensor with tape as needed if it begins to peel away from the skin. Encouraged Hector to call [facility] [address] or the [facility] with any questions. Hector verbalizes, via teach back, the understanding of:  Difference between sensor glucose and blood glucose meter. Fingerstick confirmations required for calibrations. Blood sugar trend arrows. Site selection, rotation, preparation. Proper steps to sensor insertion. Starting the sensor. Navigation of status, set up, and alert screens. Scanning frequency.   Options for support

## 2023-11-13 ENCOUNTER — TELEPHONE (OUTPATIENT)
Dept: INTERNAL MEDICINE CLINIC | Age: 53
End: 2023-11-13

## 2023-11-13 ENCOUNTER — NURSE ONLY (OUTPATIENT)
Dept: INTERNAL MEDICINE CLINIC | Age: 53
End: 2023-11-13

## 2023-11-13 VITALS — HEART RATE: 84 BPM | DIASTOLIC BLOOD PRESSURE: 74 MMHG | SYSTOLIC BLOOD PRESSURE: 158 MMHG

## 2023-11-13 DIAGNOSIS — I10 PRIMARY HYPERTENSION: Primary | ICD-10-CM

## 2023-11-13 NOTE — TELEPHONE ENCOUNTER
Patient stopped in the office today for BP check . /74 , 84 pulse . Patient states that BP running at home 160s/90s .

## 2023-11-17 ENCOUNTER — TELEPHONE (OUTPATIENT)
Dept: NEUROLOGY | Age: 53
End: 2023-11-17

## 2023-11-17 NOTE — TELEPHONE ENCOUNTER
Patient called in stating that he had a seizure on Saturday. He said that he loss consciousness and hit his head on dresser. He does not know how long it lasted and he denies bladder/ bowel incontinence. This was witnessed his by wife. He has not had any incidents since then. He is currently taking Vimpat 250 mg BID, Keppra 2000 mg BID, and Gabapentin 600 mg QID.

## 2023-11-21 ENCOUNTER — TELEPHONE (OUTPATIENT)
Dept: INTERNAL MEDICINE CLINIC | Age: 53
End: 2023-11-21

## 2023-11-21 NOTE — TELEPHONE ENCOUNTER
If patient is table, we can keep an eye on the symptoms. If seizures recur, we can get home EEG monitoring.  Thanks

## 2023-11-21 NOTE — TELEPHONE ENCOUNTER
Call patient and ask if he has had medication changed by nephrology as we are both managing this. Consider increase Aldactone to 50 mg daily if BP still high and no change per nephrology since he was in office.

## 2023-11-21 NOTE — TELEPHONE ENCOUNTER
Patient called stating that he is having difficulty with being lightheaded and dizzy when he stands up . Patient was seen by Dr. Alta Acosta today and his BP was low 80/62 at visit . He was  instructed to write down his BPs today and bring his BP log and medication to Dr. Terrance Johnson office for review tomorrow . He is to call sooner if BP remains low . Patient also wanted you to know he had a bad seizure over the weekend and hit his head on a dresser . Patient sees neurology tomorrow and he has also has an appointment schedule with urology for the urine retension .

## 2023-11-22 ENCOUNTER — TELEMEDICINE (OUTPATIENT)
Dept: NEUROLOGY | Age: 53
End: 2023-11-22
Payer: MEDICARE

## 2023-11-22 DIAGNOSIS — E66.01 SEVERE OBESITY (BMI 35.0-39.9) WITH COMORBIDITY (HCC): ICD-10-CM

## 2023-11-22 DIAGNOSIS — G40.219 PARTIAL SYMPTOMATIC EPILEPSY WITH COMPLEX PARTIAL SEIZURES, INTRACTABLE, WITHOUT STATUS EPILEPTICUS (HCC): ICD-10-CM

## 2023-11-22 DIAGNOSIS — G25.81 RESTLESS LEGS SYNDROME (RLS): ICD-10-CM

## 2023-11-22 DIAGNOSIS — R56.9 PARTIAL SEIZURES (HCC): Primary | ICD-10-CM

## 2023-11-22 DIAGNOSIS — G40.909 SEIZURE DISORDER (HCC): ICD-10-CM

## 2023-11-22 DIAGNOSIS — R56.9 SEIZURES (HCC): ICD-10-CM

## 2023-11-22 DIAGNOSIS — R25.1 TREMORS OF NERVOUS SYSTEM: ICD-10-CM

## 2023-11-22 PROCEDURE — G8484 FLU IMMUNIZE NO ADMIN: HCPCS | Performed by: PSYCHIATRY & NEUROLOGY

## 2023-11-22 PROCEDURE — 1036F TOBACCO NON-USER: CPT | Performed by: PSYCHIATRY & NEUROLOGY

## 2023-11-22 PROCEDURE — G8417 CALC BMI ABV UP PARAM F/U: HCPCS | Performed by: PSYCHIATRY & NEUROLOGY

## 2023-11-22 PROCEDURE — G8428 CUR MEDS NOT DOCUMENT: HCPCS | Performed by: PSYCHIATRY & NEUROLOGY

## 2023-11-22 PROCEDURE — 99214 OFFICE O/P EST MOD 30 MIN: CPT | Performed by: PSYCHIATRY & NEUROLOGY

## 2023-11-22 PROCEDURE — 3017F COLORECTAL CA SCREEN DOC REV: CPT | Performed by: PSYCHIATRY & NEUROLOGY

## 2023-11-22 RX ORDER — LACOSAMIDE 200 MG/1
TABLET ORAL
Qty: 60 TABLET | Refills: 5 | Status: SHIPPED | OUTPATIENT
Start: 2023-11-22 | End: 2024-05-22

## 2023-11-22 RX ORDER — LACOSAMIDE 100 MG/1
100 TABLET ORAL 2 TIMES DAILY
Qty: 120 TABLET | Refills: 4 | Status: SHIPPED | OUTPATIENT
Start: 2023-11-22 | End: 2024-01-21

## 2023-11-22 NOTE — PROGRESS NOTES
[facility]  [address] [facility], [address] [place] [address]. [address]  Ph: [phone] or [phone]  FAX: [phone]    Chief Complaint: seizures     Dear Jeana Bowling MD     As you would recall Marko Mazariegos is a 48 y.o. male. Patient presented to [facility] with my 4/7/22. He was accompanied by his wife. Patient had an arachnoid cyst and has history of right-sided neurosurgery. Patient also has history of seizures, RUE tremor, and recently has a possible focal seizure, for which an EEG was ordered. Patient's seizures vary. He reports that he does not remember most seizures. Patient admits to aura. Patient explains that his head goes numb and tingles on the left side. Sometimes he experiences eye drooping. He also loses coordination and cannot walk. He states that he falls down during seizures sometimes. Patient typically lays down when he feels an aura, and becomes confused and has hallucinations. His wife reports that the altered mental status will last for up to 6 hours. The patient reports that he also has shorter seizures that last approximately 10 minutes. Patient wife reports he has only had 2 grand mal seizures. His first grand mal seizure was after his brain surgery, and the second one occurred approximately 10 years ago. The patient's wife also reports that he sometimes had seizures wherein his eyes would roll and he would vomit. Patient admitted to incontinence during a few of these episodes as well. Among seizures, patient also complains of episodic tremors. Patient had encephalopathy in July 2021. Patient's wife states he is still recovering, as he is not as functional as he used to be. Patient has stopped driving since. Type A seizures: numbness/tingling, duration, lack of awareness  Type B: grand mal    On 5/4/23, the patient is accompanied by his wife. The patient reports following EEG monitoring in late April.  He has had multiple Type A events within

## 2023-11-22 NOTE — TELEPHONE ENCOUNTER
Burton Barrera - discussed with Rohan Lee today - his BP was still in 80's today and Nephrology stopped all BP medication but metoprolol and monitoring. Hit head with seizure - agree with follow up with Neurology today. If any mental status changes/new weakness/numbness - go to ED for head CT.

## 2023-11-27 ENCOUNTER — TELEPHONE (OUTPATIENT)
Dept: INTERNAL MEDICINE CLINIC | Age: 53
End: 2023-11-27

## 2023-11-27 ENCOUNTER — OFFICE VISIT (OUTPATIENT)
Dept: INTERNAL MEDICINE CLINIC | Age: 53
End: 2023-11-27
Payer: MEDICARE

## 2023-11-27 VITALS
HEART RATE: 83 BPM | SYSTOLIC BLOOD PRESSURE: 100 MMHG | TEMPERATURE: 98.2 F | WEIGHT: 286.4 LBS | HEIGHT: 74 IN | DIASTOLIC BLOOD PRESSURE: 78 MMHG | BODY MASS INDEX: 36.76 KG/M2

## 2023-11-27 DIAGNOSIS — Z79.4 TYPE 2 DIABETES MELLITUS WITH DIABETIC NEUROPATHY, WITH LONG-TERM CURRENT USE OF INSULIN (HCC): Primary | ICD-10-CM

## 2023-11-27 DIAGNOSIS — E11.40 TYPE 2 DIABETES MELLITUS WITH DIABETIC NEUROPATHY, WITH LONG-TERM CURRENT USE OF INSULIN (HCC): Primary | ICD-10-CM

## 2023-11-27 LAB — HBA1C MFR BLD: 6.5 % (ref 4.3–5.7)

## 2023-11-27 PROCEDURE — G0108 DIAB MANAGE TRN  PER INDIV: HCPCS | Performed by: REGISTERED NURSE

## 2023-11-27 PROCEDURE — 83036 HEMOGLOBIN GLYCOSYLATED A1C: CPT | Performed by: REGISTERED NURSE

## 2023-11-27 RX ORDER — MAGNESIUM GLYCINATE 100 MG
200 TABLET ORAL NIGHTLY
COMMUNITY
Start: 2023-11-02

## 2023-11-27 NOTE — TELEPHONE ENCOUNTER
Patient call still complaining if lightheadedness when standing . Patient states he is currently off all his BP medication and he sees Dr. Ion Caruso again 11/29 . BP readings:  11/23  81/50    92/57  11/24  106/61  91/49    101/83  11/25  145/83  165/79  11/26  170/98  147/94  11/27  128/74    Neurology increased patient's Vimpat 200 mg to 300 mg .

## 2023-11-27 NOTE — TELEPHONE ENCOUNTER
Hector present for Diabetes appointment today. He reports that [facility] Nephrology stopped all BS medications due to low BP. Stopped Lopressor, Amlodipine, Lisinopril and Clonidine on 11/21/2023. He states he has been dizzy/ light headed when standing and has had a headache since then. BP in the office today on arrival 143//85-83          Recheck sitting  110/80                        Standing 100/78 with light headedness  He asking that you review and advise. He also reports having had a seizure last week and hit his head. He states he has been followed up for this. Thank you.

## 2023-11-27 NOTE — PATIENT INSTRUCTIONS
Blood Sugar Dose fast acting insulin    None   140-199 4 unit   200-249  8 units   250-299 12 units   300-349 16 units   350-399 20 units   400 and above 24 units   Follow the above scale for treating a high blood sugar    Add to the dose if you are eating for the carbs    Keep track of the amount of carbohydrate you are eating      It appears you using about 1 unit for every 3 grams of                 Carbohydrate you are eating  Take Basaglar 60 units in the morning and 63 units at bedtime  To keep your Gamaliel 3 stuck               --use skin tact after you put your sensor in and cover             With a Advy sensor caitlin Jay)

## 2023-11-27 NOTE — TELEPHONE ENCOUNTER
Diabetes Pharmacotherapy:  Basaglar 60 units BID  Humalog 25 units with dinner                 Glucose Trends:   Glucose at FBS today resulted at 116mg/dl  Current monitoring regimen: Freestyle Davy 3 CGM - checks 4+ times daily  Home blood sugar trends:               -V. High: 13%, High: 29%, Target: 58%, Low: 0%, V. Low: 0%              -Average Glucose: 175mg/dL; GMI: 7.5%;  %time CGM active 29%              -glucose levels vary. Low blood sugars appear to often be r/t over treating high blood sugars. -323, Noon 138-275, 62-16, Bed   Any episodes of hypoglycemia? yes - 3-4 days per week; random timing --before or after eating evening meal     Dr. Sydni Strong,  Please authorize the Diabetes Clinic at [address] [place] [address] to teach/ assist Hector to increase his Basaglar to 63 units in the evening and to change his correction scale to \"Group 4\" (increments of 4 units) vs the random dosing he has been doing. If you agree, please note and return. Thank you.

## 2023-11-30 ENCOUNTER — TELEPHONE (OUTPATIENT)
Dept: INTERNAL MEDICINE CLINIC | Age: 53
End: 2023-11-30

## 2023-11-30 ENCOUNTER — TELEPHONE (OUTPATIENT)
Dept: NEUROLOGY | Age: 53
End: 2023-11-30

## 2023-11-30 DIAGNOSIS — G40.909 SEIZURE DISORDER (HCC): ICD-10-CM

## 2023-11-30 NOTE — TELEPHONE ENCOUNTER
Dr. Ekaterina Archuleta this is a patient of Dr. Kirill Weems that has seizures. Patient called the office today stating that he just had an appointment this morning with his nephrologist who advised that he call our office. Mr. Khari Bowling stated that he has constant lightheadedness from the time that he stands up until he sits back down. He denies symptoms when he is sitting or laying down. Patient stated that this started after his last seizure on 11/11/23 when he hit his head on a dresser and had LOC. Patient states that he did not seek medical attention at the time since his wife is a RN. He then had a follow up with Dr. Avani Rosenbaum on 11/17/23 and Vimpat was increased to 300 mg BID and to continue Keppra 2000 mg BID and Gabapentin 600 mg TID. Patient states that he also has a constant dull aching / throbbing pain on the front right side of his head. This is also the area where his previous arachnoid cyst was located. Patient stated that he didn't mention the lightheadedness or head pain to Dr. Avani Rosenbaum at the appointment because he thought it was related to his blood pressure. Patient states that his nephrologist, Dr. Lysle Scheuermann at [facility] his blood pressure. With the new complaint of lightheadedness the nephrologist ran several cardiac tests and everything has come back normal.  His blood pressure has also remained controlled and so he was taken off all his blood pressure medications a week ago. He was evaluated again this morning by the nephrologist and the blood pressure was normal and advised to call our office. Patient denies any further seizures. Since Dr. Avani Rosenbaum is out of the office for the next couple weeks could you please advise?

## 2023-11-30 NOTE — TELEPHONE ENCOUNTER
Hector called stating that he seen Dr. Yamila Bell and she suggested that he nay need to have his hearing checked because od the dizziness that he is experiencing when he stands . I ask if she was referring to vestibular therapy and he was unsure . The urologist thinks the dizziness is due to the Flomax and he had him stop it . Patient wants to see if stopping the Flomax helps before pursuing the ears. Patient states that he is still concerned about hitting his head during his seizure . Neurology only increased his Vimpat.

## 2023-12-01 NOTE — TELEPHONE ENCOUNTER
Pharmacy requesting refill of Keppra. Medication active on med list yes      Date of last fill: 8/3/23  verified on 12/1/2023   verified by [facility] LPN      Date of last appointment 11/22/23    Next Visit Date:  3/25/2024    Please approve for Dr Rupa Smith pt as he is out of office, thanks.

## 2023-12-02 RX ORDER — LEVETIRACETAM 1000 MG/1
TABLET ORAL
Qty: 120 TABLET | Refills: 0 | Status: SHIPPED | OUTPATIENT
Start: 2023-12-02 | End: 2024-01-01

## 2023-12-03 NOTE — TELEPHONE ENCOUNTER
I thought we were going to see him in office last week but no visit in chart till January. Can we get him in to assess?
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
## 2023-12-04 NOTE — PROGRESS NOTES
The has been on these medications of Vimpat, gabapentin, Keppra prior to his fall as noted in Dr. Blanca Bernstein note on 5/23. The lightheadedness/dizziness is less likely due to medications. I have ordered a CT scan of the head after the fall to rule out any head trauma. The patient can follow-up/call us back after the CT scan of the head. Otherwise following up with the PCP/nephrology for lightheadedness/dizziness for now would be reasonable and to call us back if his symptoms are worsening.

## 2023-12-04 NOTE — TELEPHONE ENCOUNTER
Call placed to the patient and this information was given. Writer offered the number to [facility] scheduling department, however patient stated that he already has it. Writer asked that he call if there were any further problems. Patient verbally stated his understanding.

## 2023-12-06 NOTE — TELEPHONE ENCOUNTER
Hector instructed to increase his Basaglar to 63 units in the evening and to change his correction scale to \"Group 4\" (increments of 4 units). Hector voices understanding of this insulin change via teach back.

## 2023-12-07 ENCOUNTER — TELEPHONE (OUTPATIENT)
Dept: NEUROLOGY | Age: 53
End: 2023-12-07

## 2023-12-07 NOTE — TELEPHONE ENCOUNTER
Received a call from the patient requesting his CT order be faxed to [phone] [facility] [address]. Order faxed to [phone].

## 2023-12-11 ENCOUNTER — HOSPITAL ENCOUNTER (OUTPATIENT)
Dept: CT IMAGING | Age: 53
Discharge: HOME | End: 2023-12-11
Payer: MEDICARE
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
## 2023-12-11 PROCEDURE — 70450 CT HEAD/BRAIN W/O DYE: CPT

## 2023-12-18 ENCOUNTER — OFFICE VISIT (OUTPATIENT)
Dept: INTERNAL MEDICINE CLINIC | Age: 53
End: 2023-12-18

## 2023-12-18 VITALS
DIASTOLIC BLOOD PRESSURE: 90 MMHG | WEIGHT: 291 LBS | SYSTOLIC BLOOD PRESSURE: 145 MMHG | BODY MASS INDEX: 37.35 KG/M2 | HEART RATE: 80 BPM | OXYGEN SATURATION: 95 % | HEIGHT: 74 IN

## 2023-12-18 DIAGNOSIS — M53.3 SACROILIAC JOINT PAIN: ICD-10-CM

## 2023-12-18 DIAGNOSIS — R06.83 SNORING: ICD-10-CM

## 2023-12-18 DIAGNOSIS — M14.672 CHARCOT'S JOINT OF LEFT FOOT: ICD-10-CM

## 2023-12-18 DIAGNOSIS — Z98.890 S/P REPAIR OF ANTERIOR CRUCIATE LIGAMENT: ICD-10-CM

## 2023-12-18 DIAGNOSIS — E83.42 HYPOMAGNESEMIA: ICD-10-CM

## 2023-12-18 DIAGNOSIS — H10.9 BACTERIAL CONJUNCTIVITIS OF RIGHT EYE: ICD-10-CM

## 2023-12-18 DIAGNOSIS — E11.40 TYPE 2 DIABETES MELLITUS WITH DIABETIC NEUROPATHY, WITH LONG-TERM CURRENT USE OF INSULIN (HCC): Primary | ICD-10-CM

## 2023-12-18 DIAGNOSIS — Z79.4 TYPE 2 DIABETES MELLITUS WITH DIABETIC NEUROPATHY, WITH LONG-TERM CURRENT USE OF INSULIN (HCC): Primary | ICD-10-CM

## 2023-12-18 DIAGNOSIS — R33.9 INCOMPLETE BLADDER EMPTYING: ICD-10-CM

## 2023-12-18 DIAGNOSIS — R42 DIZZINESS: ICD-10-CM

## 2023-12-18 DIAGNOSIS — G47.33 OSA (OBSTRUCTIVE SLEEP APNEA): ICD-10-CM

## 2023-12-18 DIAGNOSIS — G62.9 NEUROPATHY: ICD-10-CM

## 2023-12-18 DIAGNOSIS — S98.111A AMPUTATION OF RIGHT GREAT TOE (HCC): ICD-10-CM

## 2023-12-18 DIAGNOSIS — L97.514 ULCER OF RIGHT FOOT WITH NECROSIS OF BONE (HCC): ICD-10-CM

## 2023-12-18 DIAGNOSIS — M14.671 CHARCOT'S JOINT OF RIGHT FOOT: ICD-10-CM

## 2023-12-18 DIAGNOSIS — G89.4 CHRONIC PAIN SYNDROME: ICD-10-CM

## 2023-12-18 DIAGNOSIS — S83.281D ACUTE LATERAL MENISCUS TEAR OF RIGHT KNEE, SUBSEQUENT ENCOUNTER: ICD-10-CM

## 2023-12-18 DIAGNOSIS — G47.20 CIRCADIAN RHYTHM DISORDER: ICD-10-CM

## 2023-12-18 DIAGNOSIS — M25.562 PAIN IN BOTH KNEES, UNSPECIFIED CHRONICITY: ICD-10-CM

## 2023-12-18 DIAGNOSIS — M25.561 PAIN IN BOTH KNEES, UNSPECIFIED CHRONICITY: ICD-10-CM

## 2023-12-18 DIAGNOSIS — M47.816 FACET HYPERTROPHY OF LUMBAR REGION: ICD-10-CM

## 2023-12-18 DIAGNOSIS — M48.061 SPINAL STENOSIS OF LUMBAR REGION WITHOUT NEUROGENIC CLAUDICATION: ICD-10-CM

## 2023-12-18 DIAGNOSIS — I10 ESSENTIAL HYPERTENSION: ICD-10-CM

## 2023-12-18 DIAGNOSIS — G40.209 PARTIAL SYMPTOMATIC EPILEPSY WITH COMPLEX PARTIAL SEIZURES, NOT INTRACTABLE, WITHOUT STATUS EPILEPTICUS (HCC): ICD-10-CM

## 2023-12-18 RX ORDER — TADALAFIL 10 MG/1
10 TABLET ORAL EVERY OTHER DAY
COMMUNITY
Start: 2023-11-30

## 2023-12-18 RX ORDER — ERYTHROMYCIN 5 MG/G
OINTMENT OPHTHALMIC
Qty: 1 G | Refills: 0 | Status: SHIPPED | OUTPATIENT
Start: 2023-12-18 | End: 2023-12-28

## 2023-12-18 NOTE — PROGRESS NOTES
diet/nutrition and exercise   - Documentation was completed for diabetic shoe as outlined above. 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 Lynne Randolph and Dr. No Tay). The patient would like to establish with an orthopedic surgeon provider.   He was provided with a external referral per his preference to [facility] for advance care recommendations  - Diabetic foot examination was performed and paperwork was filled out to facilitate the procurement of diabetic shoes with the exact specifications to be provided by the patient's outpatient podiatrist  - The patient is recommended to continue following the current recommendations of his current podiatrist  - The patient plans to follow-up with physical medicine/pain medicine for optimization of his analgesia as well as consideration for EMG as well as nerve stimulator placement in 2024    Persistent Lightheadedness/Dizziness  - Patient endorses persistent lightheadedness/dizziness when transitioning from sitting to standing  - The patient was encouraged to maintain adequate hydration as well as to track his blood pressure in outpatient setting closely    Essential Hypertension, with Recent Hypotension  -Previous Regimen: Lisinopril 20mg, Metoprolol Tartrate 25mg BIDmg, Amlodipine 10mg daily, Clonidine 0.1mg daily, Spironolactone 25mg daily  -Medication regimen discontinued by

## 2023-12-25 DIAGNOSIS — E11.40 TYPE 2 DIABETES MELLITUS WITH DIABETIC NEUROPATHY, WITH LONG-TERM CURRENT USE OF INSULIN (HCC): ICD-10-CM

## 2023-12-25 DIAGNOSIS — R11.2 NON-INTRACTABLE VOMITING WITH NAUSEA: ICD-10-CM

## 2023-12-25 DIAGNOSIS — Z79.4 TYPE 2 DIABETES MELLITUS WITH DIABETIC NEUROPATHY, WITH LONG-TERM CURRENT USE OF INSULIN (HCC): ICD-10-CM

## 2023-12-26 RX ORDER — PROMETHAZINE HYDROCHLORIDE 12.5 MG/1
12.5-25 TABLET ORAL EVERY 8 HOURS PRN
Qty: 60 TABLET | Refills: 1 | Status: SHIPPED | OUTPATIENT
Start: 2023-12-26

## 2023-12-27 DIAGNOSIS — F41.9 ANXIETY: Primary | ICD-10-CM

## 2023-12-27 DIAGNOSIS — G40.909 SEIZURE DISORDER (HCC): ICD-10-CM

## 2023-12-27 NOTE — TELEPHONE ENCOUNTER
Pharmacy requesting refill of ropinirole       Medication active on med list yes      Date of last fill: 10/2/23 90d with 0 refills verified on 12/27/23  verified by JEWEL VIDAL      Date of last appointment 11/22/23    Next Visit Date:  3/25/24

## 2023-12-27 NOTE — TELEPHONE ENCOUNTER
Pharmacy requesting refill of:  Levetiracetam (Keppra) 1000 mg tablet      Medication active on med list: yes      Date of last Rx: 12/2/23  with 0 refills   verified on 12/27/2023   verified by BAKARI RAMSEY      Date of last appointment:11/22/2023    Next Visit Date:  3/25/2024

## 2023-12-27 NOTE — TELEPHONE ENCOUNTER
Patient returned call.   Call transferred to scheduling.     Pharmacy requesting refill on Lorazepam 1 mg.     We prescribed this last 4/7/22

## 2023-12-28 RX ORDER — LEVETIRACETAM 1000 MG/1
TABLET ORAL
Qty: 120 TABLET | Refills: 5 | Status: SHIPPED | OUTPATIENT
Start: 2023-12-28

## 2023-12-28 RX ORDER — LORAZEPAM 1 MG/1
TABLET ORAL
Qty: 12 TABLET | Refills: 0 | Status: SHIPPED | OUTPATIENT
Start: 2023-12-28 | End: 2024-01-27

## 2023-12-28 RX ORDER — ROPINIROLE 1 MG/1
TABLET, FILM COATED ORAL
Qty: 405 TABLET | Refills: 1 | Status: SHIPPED | OUTPATIENT
Start: 2023-12-28

## 2023-12-30 RX ORDER — GABAPENTIN 600 MG/1
600 TABLET ORAL 3 TIMES DAILY
Qty: 90 TABLET | Refills: 2 | Status: SHIPPED | OUTPATIENT
Start: 2023-12-30 | End: 2024-03-29

## 2024-01-04 ENCOUNTER — OFFICE VISIT (OUTPATIENT)
Dept: INTERNAL MEDICINE CLINIC | Age: 54
End: 2024-01-04

## 2024-01-04 DIAGNOSIS — Z79.4 TYPE 2 DIABETES MELLITUS WITH DIABETIC NEUROPATHY, WITH LONG-TERM CURRENT USE OF INSULIN (HCC): Primary | ICD-10-CM

## 2024-01-04 DIAGNOSIS — E11.40 TYPE 2 DIABETES MELLITUS WITH DIABETIC NEUROPATHY, WITH LONG-TERM CURRENT USE OF INSULIN (HCC): Primary | ICD-10-CM

## 2024-01-04 RX ORDER — MULTIVIT,TX WITH IRON,MINERALS
2 TABLET, EXTENDED RELEASE ORAL 2 TIMES DAILY
COMMUNITY

## 2024-01-04 NOTE — PATIENT INSTRUCTIONS
Try to lighten up your evening snack: consider 3 PB crackers instead of 6, piece of fresh fruit (half banana OR orange)    2. Pharmacist will discuss the Eversense with Dr. Hutson    Treatment of hypoglycemia (low blood sugars):   -If sugar is below 80 mg/dL, treat with 15 grams of simple sugar/rapid acting carb (3-4 glucose tablets, 4 oz of regular juice, 1/2 can of pop, 5 sugar-containing candies, 1 tablespoon of honey, 13-15 sweet tarts).   -Recheck in 15 minutes. If above 80 mg/dL, have small meal or snack. If not above 80 mg/dL, repeat the 15 grams of simple carbs until above 80 mg/dL.

## 2024-01-04 NOTE — PROGRESS NOTES
[facility]  [address] [place] [address]  [phone] (phone)  [phone] (fax)    Patient ID: Nicolas Mackenzie 1970  Referring Provider: Dr. Hutson     Patient's name and  were verified.    Subjective:    He presents for a follow-up diabetic visit. He has type 2 diabetes mellitus. He is compliant a majority of the time.  Assessment:     Lab Results   Component Value Date/Time    LABA1C 6.5 2023 10:47 AM    LABA1C 7.0 2023 02:00 PM    BUN 20 2023 10:16 AM    CREATININE 0.99 2023 10:16 AM     Vitals:    24 0937 24 0947   BP: (!) 184/92 (!) 162/90   Pulse: 87    Temp: 97.7 °F (36.5 °C)    Weight: (!) 141.8 kg (312 lb 9.6 oz)      Wt Readings from Last 3 Encounters:   24 (!) 141.8 kg (312 lb 9.6 oz)   23 132 kg (291 lb)   23 129.9 kg (286 lb 6.4 oz)     Ht Readings from Last 3 Encounters:   23 1.88 m (6' 2\")   23 1.88 m (6' 2\")   10/31/23 1.88 m (6' 2.02\")     Est, Glom Filt Rate   Date Value Ref Range Status   10/09/2023 >60 >60 ml/min/1.73m2 Final     Diabetes Pharmacotherapy:  Basaglar 70 units BID  Humalog 30 to 40 units before meals + gp 4 scale    Glucose Trends:   Current monitoring regimen: Fingerstick blood tests - 3-4 times daily  Home blood sugar trends:    -Average Glucose: 195mg/dL, Range:   Any episodes of hypoglycemia? yes - weekly or less   -Treats with PB marilee    Lifestyle Factors:   Previous visit with dietician: no  Current diet: B: skips            L: soup & salad at [employer] OR sometimes skips (more than half the time)                       D: 6pm - Italian sausage/spinach/tortellini soup                       Snacks: (HS): couple cookies, PB crackers                       Beverages:  Current exercise:  limited by pain    Health Maintenance:  There are no preventive care reminders to display for this patient.    Eye exam current (within one year): yes Date: 2023, Dr. Roper  Any

## 2024-01-09 ENCOUNTER — ENROLLMENT (OUTPATIENT)
Dept: INTERNAL MEDICINE CLINIC | Age: 54
End: 2024-01-09

## 2024-01-09 VITALS
DIASTOLIC BLOOD PRESSURE: 90 MMHG | HEART RATE: 87 BPM | SYSTOLIC BLOOD PRESSURE: 162 MMHG | WEIGHT: 312.6 LBS | BODY MASS INDEX: 40.14 KG/M2 | TEMPERATURE: 97.7 F

## 2024-01-10 ENCOUNTER — TELEPHONE (OUTPATIENT)
Dept: NEUROLOGY | Age: 54
End: 2024-01-10

## 2024-01-10 NOTE — TELEPHONE ENCOUNTER
Hector called the office this morning stating that he thinks he had a seizure yesterday.  He stated that he woke up between 5 - 5:30 am with the kids and everything was fine until around 8:30 am.  Patient states that he developed numbness in his head (front and top) and blurred vision in the right eye  He told his daughter he wasn't feeling well and went back to bed.  Hector stated that he was \"out of it\" the rest of the day, sleeping, unable to wake up, even when his wife got home at 7 pm.  Hector stated that he had loss of bladder so he figured he had a seizure.  Writer confirmed that he is using Vimpat 300 mg BID and Keppra 2000 mg BID and denies missing any doses of medication.  Hector stated that he is still feeling very lethargic today.  Writer advised that I would forward this information to Dr. Sue.

## 2024-01-10 NOTE — TELEPHONE ENCOUNTER
We can refer the patient for repeat long-term EEG monitoring to capture his seizures and consider for epilepsy surgical evaluation.  Currently he is maxed out on Keppra, Vimpat and has tried other antiepileptic medications.  It is an option to add Xcopri 25 mg a day for 2 weeks then 25 mg BID in the meantime and follow-up with me in next 3 to 4 weeks.

## 2024-01-11 NOTE — TELEPHONE ENCOUNTER
Call placed to the patient and this information was given.  Patient stated that he has already gone through LTME and it didn't show anything.  Hector also stated that he wants to look into the Xcopri first and will let the office know.

## 2024-01-18 ENCOUNTER — TELEPHONE (OUTPATIENT)
Dept: INTERNAL MEDICINE CLINIC | Age: 54
End: 2024-01-18

## 2024-01-18 NOTE — TELEPHONE ENCOUNTER
Patient called stating that around his finger nail looks infected and hurts up to the middle if his arm . 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 Offered patient a 11:30 appointment with Dr. Hutson and patient is unable to make irt . There are no openings available in resident clinic today so suggested Urgent Care as patient needs finger looked at ASAP. Patient verbalized understanding .

## 2024-01-24 ENCOUNTER — TELEPHONE (OUTPATIENT)
Dept: INTERNAL MEDICINE CLINIC | Age: 54
End: 2024-01-24

## 2024-01-24 RX ORDER — LISINOPRIL 20 MG/1
20 TABLET ORAL 2 TIMES DAILY
COMMUNITY

## 2024-01-24 NOTE — TELEPHONE ENCOUNTER
----- Message from Kesha Hutson MD sent at 12/29/2023 11:33 PM EST -----  Please call patient and ask if he has stopped Aldactone completely and just taking metoprolol 25 mg BID for SBP >120.  This is what last note from nephrology said 11/30/23, but not what is on our med list - please correct medication list.    Please ask patient who he is seeing for Urology - hasn't see Dr. Jean since 2021.  Is he seeing someone at [facility]?  Get notes - need to address his urine retention.    Send note from 12/18 resident clinic to nephrology at [facility] and [facility].

## 2024-01-29 ENCOUNTER — OFFICE VISIT (OUTPATIENT)
Dept: INTERNAL MEDICINE CLINIC | Age: 54
End: 2024-01-29

## 2024-01-29 VITALS
DIASTOLIC BLOOD PRESSURE: 70 MMHG | HEIGHT: 74 IN | BODY MASS INDEX: 39.42 KG/M2 | HEART RATE: 92 BPM | TEMPERATURE: 98.7 F | WEIGHT: 307.2 LBS | SYSTOLIC BLOOD PRESSURE: 130 MMHG

## 2024-01-29 DIAGNOSIS — E11.40 TYPE 2 DIABETES MELLITUS WITH DIABETIC NEUROPATHY, WITH LONG-TERM CURRENT USE OF INSULIN (HCC): ICD-10-CM

## 2024-01-29 DIAGNOSIS — I10 ESSENTIAL HYPERTENSION: ICD-10-CM

## 2024-01-29 DIAGNOSIS — L03.012 PARONYCHIA OF FINGER, LEFT: Primary | ICD-10-CM

## 2024-01-29 DIAGNOSIS — R00.0 TACHYCARDIA: ICD-10-CM

## 2024-01-29 DIAGNOSIS — E66.01 SEVERE OBESITY (BMI 35.0-39.9) WITH COMORBIDITY (HCC): ICD-10-CM

## 2024-01-29 DIAGNOSIS — Z79.4 TYPE 2 DIABETES MELLITUS WITH DIABETIC NEUROPATHY, WITH LONG-TERM CURRENT USE OF INSULIN (HCC): ICD-10-CM

## 2024-01-29 RX ORDER — CLINDAMYCIN HYDROCHLORIDE 300 MG/1
300 CAPSULE ORAL 4 TIMES DAILY
Qty: 40 CAPSULE | Refills: 0 | Status: SHIPPED | OUTPATIENT
Start: 2024-01-29 | End: 2024-02-08

## 2024-01-29 RX ORDER — CLINDAMYCIN HYDROCHLORIDE 300 MG/1
CAPSULE ORAL
COMMUNITY
Start: 2024-01-18 | End: 2024-01-29

## 2024-01-29 RX ORDER — METOPROLOL TARTRATE 50 MG/1
50 TABLET, FILM COATED ORAL 2 TIMES DAILY
Qty: 180 TABLET | Refills: 1 | Status: SHIPPED | OUTPATIENT
Start: 2024-01-29

## 2024-01-29 RX ORDER — LACOSAMIDE 100 MG/1
100 TABLET ORAL 2 TIMES DAILY
COMMUNITY

## 2024-01-29 ASSESSMENT — PATIENT HEALTH QUESTIONNAIRE - PHQ9
1. LITTLE INTEREST OR PLEASURE IN DOING THINGS: 2
8. MOVING OR SPEAKING SO SLOWLY THAT OTHER PEOPLE COULD HAVE NOTICED. OR THE OPPOSITE, BEING SO FIGETY OR RESTLESS THAT YOU HAVE BEEN MOVING AROUND A LOT MORE THAN USUAL: 0
3. TROUBLE FALLING OR STAYING ASLEEP: 3
4. FEELING TIRED OR HAVING LITTLE ENERGY: 2
SUM OF ALL RESPONSES TO PHQ QUESTIONS 1-9: 9
6. FEELING BAD ABOUT YOURSELF - OR THAT YOU ARE A FAILURE OR HAVE LET YOURSELF OR YOUR FAMILY DOWN: 0
5. POOR APPETITE OR OVEREATING: 0
SUM OF ALL RESPONSES TO PHQ QUESTIONS 1-9: 9
2. FEELING DOWN, DEPRESSED OR HOPELESS: 2
7. TROUBLE CONCENTRATING ON THINGS, SUCH AS READING THE NEWSPAPER OR WATCHING TELEVISION: 0
SUM OF ALL RESPONSES TO PHQ9 QUESTIONS 1 & 2: 4
10. IF YOU CHECKED OFF ANY PROBLEMS, HOW DIFFICULT HAVE THESE PROBLEMS MADE IT FOR YOU TO DO YOUR WORK, TAKE CARE OF THINGS AT HOME, OR GET ALONG WITH OTHER PEOPLE: 1
SUM OF ALL RESPONSES TO PHQ QUESTIONS 1-9: 9
9. THOUGHTS THAT YOU WOULD BE BETTER OFF DEAD, OR OF HURTING YOURSELF: 0
SUM OF ALL RESPONSES TO PHQ QUESTIONS 1-9: 9

## 2024-01-29 NOTE — PROGRESS NOTES
1970    Chief Complaint   Patient presents with    [facility] ER f/u      1/18/24 - left index finger infection     Hypertension    Tachycardia    Diabetes       Pt is a 53 y.o. male who presents for an urgent visit.      HPI    He was seen in ED for paronychia of left index finger 1/18/24- at the time had redness and pain in left index finger and redness was extending up arm.  I and D done in ED and given Clindamycin x 10 days.  Positive fever 102.3 last night - off clinda x 2 days - will extend Clindamycin course and set up with surgeon to further evaluate as still with redness around DPI joint and significant eschar on top of finger.  He has a history of problems with infections - multiple foot ulcers.  He is diabetic with variable control.  Wound culture from ED MRSA sensitive to clindamycin.  He denies any change in bowel habits on clindamycin - warned of C. Diff and to report if any changes.    He did trial of nerve stimulator for severe neuropathy and back pain -and patient states this improved symptoms by 70%, to get permanent nerve stimulator 2/14/24 - [facility] pain clinic.  Need to get above infection cleared prior to procedure.  Patient was made aware of this.      Hypertension/tachycardia - BP and HR (up to 130) have been elevated since ED visit but pain in feet, and finger, back - will increase metoprolol from 25 mg BID to 50 BID.  Monitor closely as if pain improves - may be able to decrease this.     DM - DM clinic suggested implantable CGM due to excessive sweating making it impossible to keep a typical CGM  in place even with use of overlay patch.  This new implantable CGM will record readings for 6 month but need to find an Endocrinologist that will do it.   Local providers who specialize in DM are in order of his preference - Dr. Barrera in [place] -> Dr. Caryl Maria -> Chalino Valentine, need to check to see who will do this.  I have put in request to Leticia, our pharmacist in resident clinic to help

## 2024-01-30 ENCOUNTER — TELEPHONE (OUTPATIENT)
Dept: SURGERY | Age: 54
End: 2024-01-30

## 2024-01-30 DIAGNOSIS — L03.012 PARONYCHIA OF FINGER, LEFT: Primary | ICD-10-CM

## 2024-01-30 NOTE — TELEPHONE ENCOUNTER
Patient is referred to us for paronychia of left finger, we do not see this diagnosis and patient needs referred to [facility].

## 2024-02-02 ENCOUNTER — TELEPHONE (OUTPATIENT)
Dept: INTERNAL MEDICINE CLINIC | Age: 54
End: 2024-02-02

## 2024-02-02 ENCOUNTER — TELEPHONE (OUTPATIENT)
Dept: NEUROLOGY | Age: 54
End: 2024-02-02

## 2024-02-02 DIAGNOSIS — G40.909 SEIZURE DISORDER (HCC): Primary | ICD-10-CM

## 2024-02-02 NOTE — TELEPHONE ENCOUNTER
Discussed with Iris - she states patient denied injury from falls.  Hector has been discussing seizures with Neurologist - and he stated at last visit recently that Neurologist recommended video EEG again.  Hector has not set up yet and was against it.  Now with more seizures - he may hopefully be more receptive.

## 2024-02-02 NOTE — TELEPHONE ENCOUNTER
FYI-     Patient called the office to make you aware that he has had 3 seizures in the last week and has fallen 2 times. He has been lightheaded and dizzy. He said he contacted his neurologist and is waiting for a call back he just wanted to make you aware. I advised him to go to the ER if it continues or gets worse.

## 2024-02-02 NOTE — TELEPHONE ENCOUNTER
I spoke with the patient again. He did state that he had hit his head; however it was not hard. He does continue to have the lightheadedness and dizziness. I advised him to go to the ER; however, he does not want to go at this time. I told him I would contact him as soon as I received a response from Dr. Sue.

## 2024-02-02 NOTE — TELEPHONE ENCOUNTER
Patient called into the office regarding three seizures that he'd had in the last week. He states that he had experienced associated dizziness and numbness in his left side; however, that had subsided after the second seizure. He did not report any bowel or bladder incontinence, no tongue biting, and he did not hit his head that he is aware of. Patient does report compliance with his medications.     Please advise.

## 2024-02-05 NOTE — TELEPHONE ENCOUNTER
This was discussed with the patient, and he is still experiencing the dizziness and falls. Patient would like to get the CT done. Order placed and patient given the number to central scheduling.

## 2024-02-14 ENCOUNTER — TELEPHONE (OUTPATIENT)
Dept: INTERNAL MEDICINE CLINIC | Age: 54
End: 2024-02-14

## 2024-02-14 ENCOUNTER — TELEPHONE (OUTPATIENT)
Dept: NEUROLOGY | Age: 54
End: 2024-02-14

## 2024-02-14 DIAGNOSIS — G40.909 SEIZURE DISORDER (HCC): Primary | ICD-10-CM

## 2024-02-14 NOTE — TELEPHONE ENCOUNTER
Nicolas  called in late Tuesday  and relayed that he had two episodes that he had difficulty walking, very confused,  fell down multiple times.  He said he didn't remember anything from Saturday morning until Monday evening.   He said he didn't think it was seizures but he did have bladder incontinence twice.  His wife is a nurse so they did not take him to ER.  He takes Vimpat 100 mg. bid and  Vimpat 200 mg. bid total of 300 mg. bid.  Also leveitiracertam 1000 mg. 2 bid. He did not miss any doses prior to this though they had difficulty giving himi his meds during this.  I did advise him in the future he should go to ER if he has something that lasts this length of time.  Please advise.

## 2024-02-14 NOTE — TELEPHONE ENCOUNTER
I put in the referral to [facility] for Hector's finger bit he is refusing to go to [facility]. General surgery will not see him . Do you have another suggestion ? Patient has follow-up on Thursday .

## 2024-02-14 NOTE — TELEPHONE ENCOUNTER
If patient interested, we can bring him to the hospital for long-term EEG monitoring.  The patient had this testing done in April 2023 but his events were not captured.  Thank you

## 2024-02-14 NOTE — TELEPHONE ENCOUNTER
I called and spoke with Hector and he is agreeable.   He would be available next week if there are openings for LTME. I advised him Corina العلي would be getting in touch with him.   He voiced understanding.

## 2024-02-22 ENCOUNTER — OFFICE VISIT (OUTPATIENT)
Dept: INTERNAL MEDICINE CLINIC | Age: 54
End: 2024-02-22

## 2024-02-22 VITALS
HEART RATE: 76 BPM | WEIGHT: 313 LBS | TEMPERATURE: 98 F | HEIGHT: 74 IN | BODY MASS INDEX: 40.17 KG/M2 | DIASTOLIC BLOOD PRESSURE: 80 MMHG | SYSTOLIC BLOOD PRESSURE: 130 MMHG

## 2024-02-22 DIAGNOSIS — I10 ESSENTIAL HYPERTENSION: ICD-10-CM

## 2024-02-22 DIAGNOSIS — R29.6 FALLS FREQUENTLY: ICD-10-CM

## 2024-02-22 DIAGNOSIS — K21.9 GASTROESOPHAGEAL REFLUX DISEASE WITHOUT ESOPHAGITIS: ICD-10-CM

## 2024-02-22 DIAGNOSIS — R56.9 SEIZURES (HCC): ICD-10-CM

## 2024-02-22 DIAGNOSIS — E83.42 HYPOMAGNESEMIA: ICD-10-CM

## 2024-02-22 DIAGNOSIS — E72.20 HYPERAMMONEMIA (HCC): ICD-10-CM

## 2024-02-22 DIAGNOSIS — R41.82 ALTERED MENTAL STATUS, UNSPECIFIED ALTERED MENTAL STATUS TYPE: Primary | ICD-10-CM

## 2024-02-22 DIAGNOSIS — F31.30 BIPOLAR DISORDER, CURRENT EPISODE DEPRESSED, MILD OR MODERATE SEVERITY, UNSPECIFIED (HCC): ICD-10-CM

## 2024-02-22 DIAGNOSIS — E55.9 VITAMIN D DEFICIENCY: ICD-10-CM

## 2024-02-22 RX ORDER — PANTOPRAZOLE SODIUM 40 MG/1
40 TABLET, DELAYED RELEASE ORAL 2 TIMES DAILY
Qty: 180 TABLET | Refills: 1 | Status: CANCELLED | OUTPATIENT
Start: 2024-02-22

## 2024-02-22 RX ORDER — ERGOCALCIFEROL 1.25 MG/1
50000 CAPSULE ORAL WEEKLY
Qty: 12 CAPSULE | Refills: 1 | Status: SHIPPED | OUTPATIENT
Start: 2024-02-22

## 2024-02-22 NOTE — PATIENT INSTRUCTIONS
Call Urology for hesitancy that is not improving on Cialis.    Call Neurology about Ativan and getting blood level on seizure drugs.

## 2024-02-22 NOTE — PROGRESS NOTES
1970    Chief Complaint   Patient presents with    Diabetes     4 months / A1c 6.5 on 11/27/23 - one week post surgery for nerve stimulator     Hypertension    parontchia of finger     Patient refused referral to [facility]       Pt is a 53 y.o. male who presents for a follow up visit    HPI    He was seen in ED for paronychia of left index finger 1/18/24- at the time had redness and pain in left index finger and redness was extending up arm.  I and D done in ED and given Clindamycin x 10 days.  Positive fever 102.3 last night - off clinda x 2 days - at last visit I extended Clindamycin course and set up with surgeon to further evaluate as still with redness around DPI joint and significant eschar on top of finger.  He has a history of problems with infections - multiple foot ulcers.  He is diabetic with variable control.  Wound culture from ED MRSA sensitive to clindamycin.  He denies any change in bowel habits on clindamycin - warned of C. Diff and to report if any changes.  He never saw ortho.  Finger has healed up.    Neuropathy - He did trial of nerve stimulator for severe neuropathy and back pain -and patient states this improved symptoms by 70%, to get permanent nerve stimulator 2/14/24 - [facility] pain clinic.  Need to get above infection cleared prior to procedure.  Patient was made aware of this.  He did get implant 2/14/ - on Percocet x last week took #23 pills. Wife was trying to monitor how much he is taking but he took and hid it.  She did see last night how much he has.  Just started by turning stimulator on yesterday.  He was able to sleep - DM neuropathy didn't kick in like normal in evening.  He still has 1/2 staples in and is to go back Wed for possible removal of remaining staples.   Two linear incisions healing well, some residual bruising on exam.      Seizures and falls - Neuro had ordered CT head with and without contrast but hasn't done it. Lightheaded and dizziness for weeks, multiple seizures and

## 2024-02-27 ENCOUNTER — TELEPHONE (OUTPATIENT)
Dept: INTERNAL MEDICINE CLINIC | Age: 54
End: 2024-02-27

## 2024-02-27 DIAGNOSIS — F41.9 ANXIETY: Primary | ICD-10-CM

## 2024-02-27 DIAGNOSIS — E55.9 VITAMIN D DEFICIENCY: Primary | ICD-10-CM

## 2024-02-27 DIAGNOSIS — G40.909 SEIZURE DISORDER (HCC): ICD-10-CM

## 2024-02-27 DIAGNOSIS — E53.8 VITAMIN B12 DEFICIENCY: ICD-10-CM

## 2024-02-27 NOTE — TELEPHONE ENCOUNTER
----- Message from Kesha Hutson MD sent at 2/26/2024 11:26 PM EST -----  Please call patient and let them know results were acceptable.

## 2024-02-27 NOTE — TELEPHONE ENCOUNTER
Hector called in today. He states he had been receiving his Vimpat from the patient assistance program, both the Vimpat 100 mg. and 200 mg. He said when he called rquesting a refill he was told \"they are sitting here but we need to hear from your doctor\"  I told him I can call them to find out what they need. I called [phone] and she advised that his enrollment  23 and then transferred to their pt. enrollment to have them fax us a new form.  I was on hold for 15 minutes then transferred to voice mail. Left message asking for return call or the form to be faxed.   I tried to call Hector to let him know this but his mailbox is full.

## 2024-02-27 NOTE — TELEPHONE ENCOUNTER
----- Message from Kesha Hutson MD sent at 2/26/2024 11:26 PM EST -----  Please call patient and let them know results show low normal B12 - recommend starting B12 1000 mcg daily OTC, vitamin D low - even on vitamin D 50,000 weekly for years.  Suggested getting liquid vitamin D at [email or web address] store that he can put drops under tongue for better absorption - 5,000 IU daily and repeat vitamin D and B12 level in 2 months.

## 2024-02-27 NOTE — TELEPHONE ENCOUNTER
Patient notified of your recommendations. Will make changes and repeat lab work in 8 weeks. Offered to mail req's to home address, pt declines. Gets labs at [facility].  Denies questions or concerns.     Labs ordered and expected 4/23/24.

## 2024-02-27 NOTE — TELEPHONE ENCOUNTER
Patient requesting refill of Ativan 1 mg. that he uses for breakthrough seizures .      Medication active on med list yes      Date of last fill: 12/28/23  with 0 refills verified on 2/27/23  verified by JEWEL VIDAL      Date of last appointment 11/22/23    Next Visit Date:  3/25/2024

## 2024-02-28 RX ORDER — LORAZEPAM 1 MG/1
TABLET ORAL
Qty: 12 TABLET | Refills: 0 | Status: SHIPPED | OUTPATIENT
Start: 2024-02-28 | End: 2024-03-27

## 2024-02-29 DIAGNOSIS — R11.2 NON-INTRACTABLE VOMITING WITH NAUSEA: ICD-10-CM

## 2024-02-29 NOTE — TELEPHONE ENCOUNTER
Mr. Mackenzie called the office this morning stating that [facility] didn't receive the Ativan ERx.  Writer advised that I would call the pharmacy.  Call placed to [facility] and writer confirmed that they did not receive the ERx.  Writer gave verbal Rx to pharmacist Mechelle, who repeated this information back.

## 2024-03-02 RX ORDER — PROMETHAZINE HYDROCHLORIDE 12.5 MG/1
12.5-25 TABLET ORAL EVERY 8 HOURS PRN
Qty: 60 TABLET | Refills: 1 | OUTPATIENT
Start: 2024-03-02

## 2024-03-05 ENCOUNTER — TELEPHONE (OUTPATIENT)
Dept: NEUROLOGY | Age: 54
End: 2024-03-05

## 2024-03-05 NOTE — TELEPHONE ENCOUNTER
If the patient continues to have episodes, I would recommend him for long-term EEG monitoring to capture the spells.  Thank you

## 2024-03-05 NOTE — TELEPHONE ENCOUNTER
Pt called in stating that he had an episode on both Saturday and Sunday. He said that he didn't even feel it coming on. He had lost his bowels. His entire right side of his body went limp and fell on the floor. He said then on Sunday he had a similar episode, but it didn't last as long. He said he lost his bowels this time as well, which is very unusual for him.    He has not missed any doses of his medications. I asked about any issues with sleep or stress, he said that he always has stress issues, he is on medication for depression.

## 2024-03-07 NOTE — TELEPHONE ENCOUNTER
Hector called in.  He said he is not doing any better.  He is scheduled for LTME on 3/22. He just wanted you to know. I did advise him if things do continue to worsen then he should go to [facility].

## 2024-03-12 ENCOUNTER — TELEPHONE (OUTPATIENT)
Dept: NEUROLOGY | Age: 54
End: 2024-03-12

## 2024-03-12 NOTE — TELEPHONE ENCOUNTER
Hector called the office today stating that he had an \"episode\" on Saturday and went to [facility].  Symptoms included severe lightheadedness, dizziness, being unable to walk.  There was also bilateral arm and leg weakness with a headache on the right side of the head\".  Hector stated that this was the same side as his surgery.  Patient denies issues  with vision or speech.  There was no nausea, vomiting or seizure.  Hector confirmed that symptoms lasted a couple hours.  Hector stated that the ER didn't find anything wrong.  Writer advised that I would forward this information to Dr. Sue.  Writer did explain that Dr. Sue is rounding at the hospital this week so it may take a little longer for the doctor to respond.  Writer asked that he call if there were any further problems.  Patient verbally stated his understanding.

## 2024-03-14 RX ORDER — LACOSAMIDE 100 MG/1
100 TABLET ORAL 2 TIMES DAILY
Qty: 60 TABLET | Refills: 5 | Status: SHIPPED | OUTPATIENT
Start: 2024-03-14 | End: 2024-03-14 | Stop reason: SDUPTHER

## 2024-03-14 RX ORDER — LACOSAMIDE 200 MG/1
200 TABLET ORAL 2 TIMES DAILY
Qty: 60 TABLET | Refills: 5 | Status: SHIPPED | OUTPATIENT
Start: 2024-03-14 | End: 2024-03-14 | Stop reason: SDUPTHER

## 2024-03-14 RX ORDER — LACOSAMIDE 100 MG/1
100 TABLET ORAL 2 TIMES DAILY
Qty: 60 TABLET | Refills: 5 | Status: SHIPPED | OUTPATIENT
Start: 2024-03-14 | End: 2024-09-10

## 2024-03-14 RX ORDER — LACOSAMIDE 200 MG/1
200 TABLET ORAL 2 TIMES DAILY
Qty: 60 TABLET | Refills: 5 | Status: SHIPPED | OUTPATIENT
Start: 2024-03-14 | End: 2024-09-10

## 2024-03-14 NOTE — TELEPHONE ENCOUNTER
Patient called in about this again as he is now out of medication. I called the number listed, [phone], was on hold for about 25 minutes and then was directly transferred to [facility]. I called right back and chose a different line. I spoke with Melany. She transferred me to the patient assistance line. There I spoke with Holly. She is faxing a new form to [phone]. She stated there is no online form for us to fill out, they only service Vimpat for the grandfathered patient's. She is suggesting that if we fax the form to have patient call them the next day and request a rush fill. She said that if we email the form to [email or web address], to have the patient call them within the hour and request a rush fill.    We are awaiting the form to be completed.

## 2024-03-15 ENCOUNTER — HOSPITAL ENCOUNTER (OUTPATIENT)
Dept: MRI IMAGING | Age: 54
Discharge: HOME | End: 2024-03-15
Payer: MEDICARE

## 2024-03-15 DIAGNOSIS — H53.2 DIPLOPIA: ICD-10-CM

## 2024-03-15 PROCEDURE — A9579 GAD-BASE MR CONTRAST NOS,1ML: HCPCS

## 2024-03-15 PROCEDURE — 6360000004 HC RX CONTRAST MEDICATION

## 2024-03-15 PROCEDURE — 70553 MRI BRAIN STEM W/O & W/DYE: CPT

## 2024-03-15 RX ADMIN — GADOTERIDOL 20 ML: 279.3 INJECTION, SOLUTION INTRAVENOUS at 15:56

## 2024-03-20 ENCOUNTER — HOSPITAL ENCOUNTER (INPATIENT)
Age: 54
LOS: 3 days | Discharge: HOME | End: 2024-03-23
Attending: PSYCHIATRY & NEUROLOGY | Admitting: PSYCHIATRY & NEUROLOGY
Payer: MEDICARE

## 2024-03-20 LAB
EST. AVERAGE GLUCOSE BLD GHB EST-MCNC: 131 MG/DL
GLUCOSE BLD-MCNC: 133 MG/DL (ref 75–110)
GLUCOSE BLD-MCNC: 172 MG/DL (ref 75–110)
GLUCOSE BLD-MCNC: 193 MG/DL (ref 75–110)
HBA1C MFR BLD: 6.2 % (ref 4–6)

## 2024-03-20 PROCEDURE — 6370000000 HC RX 637 (ALT 250 FOR IP)

## 2024-03-20 PROCEDURE — 82947 ASSAY GLUCOSE BLOOD QUANT: CPT

## 2024-03-20 PROCEDURE — 6360000002 HC RX W HCPCS

## 2024-03-20 PROCEDURE — 36415 COLL VENOUS BLD VENIPUNCTURE: CPT

## 2024-03-20 PROCEDURE — 2060000000 HC ICU INTERMEDIATE R&B

## 2024-03-20 PROCEDURE — 83036 HEMOGLOBIN GLYCOSYLATED A1C: CPT

## 2024-03-20 PROCEDURE — 2580000003 HC RX 258

## 2024-03-20 PROCEDURE — 99222 1ST HOSP IP/OBS MODERATE 55: CPT | Performed by: PSYCHIATRY & NEUROLOGY

## 2024-03-20 RX ORDER — LEVETIRACETAM 500 MG/1
1500 TABLET ORAL 2 TIMES DAILY
Status: DISCONTINUED | OUTPATIENT
Start: 2024-03-20 | End: 2024-03-21

## 2024-03-20 RX ORDER — POLYETHYLENE GLYCOL 3350 17 G/17G
17 POWDER, FOR SOLUTION ORAL DAILY PRN
Status: DISCONTINUED | OUTPATIENT
Start: 2024-03-20 | End: 2024-03-23 | Stop reason: HOSPADM

## 2024-03-20 RX ORDER — POTASSIUM CHLORIDE 20 MEQ/1
40 TABLET, EXTENDED RELEASE ORAL PRN
Status: DISCONTINUED | OUTPATIENT
Start: 2024-03-20 | End: 2024-03-23 | Stop reason: HOSPADM

## 2024-03-20 RX ORDER — MAGNESIUM SULFATE IN WATER 40 MG/ML
2000 INJECTION, SOLUTION INTRAVENOUS PRN
Status: DISCONTINUED | OUTPATIENT
Start: 2024-03-20 | End: 2024-03-23 | Stop reason: HOSPADM

## 2024-03-20 RX ORDER — DEXTROSE MONOHYDRATE 100 MG/ML
INJECTION, SOLUTION INTRAVENOUS CONTINUOUS PRN
Status: DISCONTINUED | OUTPATIENT
Start: 2024-03-20 | End: 2024-03-23 | Stop reason: HOSPADM

## 2024-03-20 RX ORDER — GABAPENTIN 600 MG/1
300 TABLET ORAL 3 TIMES DAILY
Status: DISCONTINUED | OUTPATIENT
Start: 2024-03-20 | End: 2024-03-23

## 2024-03-20 RX ORDER — GLUCAGON 1 MG/ML
1 KIT INJECTION PRN
Status: DISCONTINUED | OUTPATIENT
Start: 2024-03-20 | End: 2024-03-23 | Stop reason: HOSPADM

## 2024-03-20 RX ORDER — ACETAMINOPHEN 650 MG/1
650 SUPPOSITORY RECTAL EVERY 6 HOURS PRN
Status: DISCONTINUED | OUTPATIENT
Start: 2024-03-20 | End: 2024-03-23 | Stop reason: HOSPADM

## 2024-03-20 RX ORDER — ENOXAPARIN SODIUM 100 MG/ML
30 INJECTION SUBCUTANEOUS 2 TIMES DAILY
Status: DISCONTINUED | OUTPATIENT
Start: 2024-03-20 | End: 2024-03-23 | Stop reason: HOSPADM

## 2024-03-20 RX ORDER — METOPROLOL TARTRATE 50 MG/1
50 TABLET, FILM COATED ORAL 2 TIMES DAILY
Status: DISCONTINUED | OUTPATIENT
Start: 2024-03-20 | End: 2024-03-23 | Stop reason: HOSPADM

## 2024-03-20 RX ORDER — POTASSIUM CHLORIDE 7.45 MG/ML
10 INJECTION INTRAVENOUS PRN
Status: DISCONTINUED | OUTPATIENT
Start: 2024-03-20 | End: 2024-03-23 | Stop reason: HOSPADM

## 2024-03-20 RX ORDER — INSULIN LISPRO 100 [IU]/ML
0-4 INJECTION, SOLUTION INTRAVENOUS; SUBCUTANEOUS
Status: DISCONTINUED | OUTPATIENT
Start: 2024-03-21 | End: 2024-03-21

## 2024-03-20 RX ORDER — ONDANSETRON 2 MG/ML
4 INJECTION INTRAMUSCULAR; INTRAVENOUS EVERY 6 HOURS PRN
Status: DISCONTINUED | OUTPATIENT
Start: 2024-03-20 | End: 2024-03-23 | Stop reason: HOSPADM

## 2024-03-20 RX ORDER — TADALAFIL 20 MG/1
10 TABLET ORAL EVERY OTHER DAY
Status: DISCONTINUED | OUTPATIENT
Start: 2024-03-20 | End: 2024-03-21

## 2024-03-20 RX ORDER — SODIUM CHLORIDE 9 MG/ML
INJECTION, SOLUTION INTRAVENOUS PRN
Status: DISCONTINUED | OUTPATIENT
Start: 2024-03-20 | End: 2024-03-23 | Stop reason: HOSPADM

## 2024-03-20 RX ORDER — SODIUM CHLORIDE 0.9 % (FLUSH) 0.9 %
5-40 SYRINGE (ML) INJECTION EVERY 12 HOURS SCHEDULED
Status: DISCONTINUED | OUTPATIENT
Start: 2024-03-20 | End: 2024-03-23 | Stop reason: HOSPADM

## 2024-03-20 RX ORDER — INSULIN GLARGINE 100 [IU]/ML
0.2 INJECTION, SOLUTION SUBCUTANEOUS NIGHTLY
Status: DISCONTINUED | OUTPATIENT
Start: 2024-03-20 | End: 2024-03-21

## 2024-03-20 RX ORDER — INSULIN LISPRO 100 [IU]/ML
0-4 INJECTION, SOLUTION INTRAVENOUS; SUBCUTANEOUS NIGHTLY
Status: DISCONTINUED | OUTPATIENT
Start: 2024-03-20 | End: 2024-03-21

## 2024-03-20 RX ORDER — ACETAMINOPHEN 325 MG/1
650 TABLET ORAL EVERY 6 HOURS PRN
Status: DISCONTINUED | OUTPATIENT
Start: 2024-03-20 | End: 2024-03-23 | Stop reason: HOSPADM

## 2024-03-20 RX ORDER — LORAZEPAM 2 MG/ML
4 INJECTION INTRAMUSCULAR EVERY 5 MIN PRN
Status: DISCONTINUED | OUTPATIENT
Start: 2024-03-20 | End: 2024-03-23 | Stop reason: HOSPADM

## 2024-03-20 RX ORDER — SODIUM CHLORIDE 0.9 % (FLUSH) 0.9 %
5-40 SYRINGE (ML) INJECTION PRN
Status: DISCONTINUED | OUTPATIENT
Start: 2024-03-20 | End: 2024-03-23 | Stop reason: HOSPADM

## 2024-03-20 RX ORDER — LISINOPRIL 20 MG/1
20 TABLET ORAL 2 TIMES DAILY
Status: DISCONTINUED | OUTPATIENT
Start: 2024-03-20 | End: 2024-03-23 | Stop reason: HOSPADM

## 2024-03-20 RX ORDER — ONDANSETRON 4 MG/1
4 TABLET, ORALLY DISINTEGRATING ORAL EVERY 8 HOURS PRN
Status: DISCONTINUED | OUTPATIENT
Start: 2024-03-20 | End: 2024-03-23 | Stop reason: HOSPADM

## 2024-03-20 RX ADMIN — LEVETIRACETAM 1500 MG: 500 TABLET, FILM COATED ORAL at 20:50

## 2024-03-20 RX ADMIN — ENOXAPARIN SODIUM 30 MG: 100 INJECTION SUBCUTANEOUS at 20:50

## 2024-03-20 RX ADMIN — SODIUM CHLORIDE, PRESERVATIVE FREE 10 ML: 5 INJECTION INTRAVENOUS at 20:51

## 2024-03-20 RX ADMIN — LISINOPRIL 20 MG: 20 TABLET ORAL at 20:50

## 2024-03-20 RX ADMIN — INSULIN GLARGINE 27 UNITS: 100 INJECTION, SOLUTION SUBCUTANEOUS at 20:50

## 2024-03-20 RX ADMIN — GABAPENTIN 300 MG: 600 TABLET ORAL at 20:50

## 2024-03-20 RX ADMIN — LACOSAMIDE 150 MG: 100 TABLET, FILM COATED ORAL at 20:50

## 2024-03-20 RX ADMIN — METOPROLOL TARTRATE 50 MG: 50 TABLET, FILM COATED ORAL at 20:50

## 2024-03-20 ASSESSMENT — ENCOUNTER SYMPTOMS
CONSTIPATION: 0
ABDOMINAL PAIN: 0
RHINORRHEA: 0
ABDOMINAL DISTENTION: 0
SHORTNESS OF BREATH: 0
PHOTOPHOBIA: 0
COUGH: 0
BACK PAIN: 0
COLOR CHANGE: 0
DIARRHEA: 0
NAUSEA: 0

## 2024-03-20 NOTE — PROGRESS NOTES
Dr. Chávez notified that patient has arrived to unit. Vitals and blood sugar taken, admission complete. Awaiting admission orders at this time.

## 2024-03-20 NOTE — H&P
[facility] Neurology   IN-PATIENT SERVICE   [facility]    HISTORY AND PHYSICAL EXAMINATION            Date:   3/20/2024  Patient name:  Nicolas Mackenzie  Date of admission:  3/20/2024  2:19 PM  MRN:   [id number]  Account:  [id number]  YOB: 1970  PCP:    Kesha uHtson MD  Room:   [address] [place] [address]  Code Status:    Prior    Chief Complaint:     Seizure    History Obtained From:     patient    History of Present Illness:     The patient is a 53 y.o. male, H/O R frontal subarachnoid cyst s/p R craniectomy (11/2012), intractable seizures post-craniectomy, L hemithyroidectomy d/t follicular adenoma, HTN, HLD, DM with neuropathy, DVT/PE s/p IVC filter placement (2007), bipolar disorder, chronic back pain, non-alcoholic fatty liver, who was admitted on 3/20/2024 for elective long-term EEG monitoring    Was last seen by a neurologist Dr. Sue on November 2023 for management of seizures, was noted to have focal seizure with impaired awareness, reports that he has about 1 seizure a week last seizure was on Saturday lasting about 5 minutes followed by postictal phase of 3 hours, states that he is stuttering more recently, has word finding difficulty.  His medications include Keppra, Vimpat, and gabapentin which she takes for neuropathy.  Dose of Vimpat was increased from 250 milligram twice daily to 300 mg twice daily from November and he have not noticed any improvement in his seizures.    Current prophylactic medication Dosage   Keppra 2000 mg BID   Vimpat 300 BID   Gabapentin 600 TID            Previous prophylactic medications Reason for discontinuation   Depakote     Dilantin     Lamictal     Zonisamide                        Past Medical History:     Past Medical History:   Diagnosis Date    Abnormal thyroid biopsy     s/p left hemithyroidectomy 1/2021 -benign follicular adenoma    Acid reflux     Anemia     resolved - previously seen by Dr. Jordan (due to enlarged spleen)

## 2024-03-21 ENCOUNTER — APPOINTMENT (OUTPATIENT)
Dept: GENERAL RADIOLOGY | Age: 54
End: 2024-03-21
Attending: PSYCHIATRY & NEUROLOGY
Payer: MEDICARE

## 2024-03-21 LAB
ANION GAP SERPL CALCULATED.3IONS-SCNC: 7 MMOL/L (ref 9–16)
BUN SERPL-MCNC: 14 MG/DL (ref 6–20)
CALCIUM SERPL-MCNC: 8.5 MG/DL (ref 8.6–10.4)
CHLORIDE SERPL-SCNC: 106 MMOL/L (ref 98–107)
CO2 SERPL-SCNC: 27 MMOL/L (ref 20–31)
CREAT SERPL-MCNC: 0.8 MG/DL (ref 0.7–1.2)
ERYTHROCYTE [DISTWIDTH] IN BLOOD BY AUTOMATED COUNT: 13.2 % (ref 11.8–14.4)
EST. AVERAGE GLUCOSE BLD GHB EST-MCNC: 131 MG/DL
GFR SERPL CREATININE-BSD FRML MDRD: >60 ML/MIN/1.73M2
GLUCOSE BLD-MCNC: 230 MG/DL (ref 75–110)
GLUCOSE BLD-MCNC: 233 MG/DL (ref 75–110)
GLUCOSE BLD-MCNC: 240 MG/DL (ref 75–110)
GLUCOSE BLD-MCNC: 269 MG/DL (ref 75–110)
GLUCOSE SERPL-MCNC: 220 MG/DL (ref 74–99)
HBA1C MFR BLD: 6.2 % (ref 4–6)
HCT VFR BLD AUTO: 38.6 % (ref 40.7–50.3)
HGB BLD-MCNC: 13.7 G/DL (ref 13–17)
MCH RBC QN AUTO: 30.9 PG (ref 25.2–33.5)
MCHC RBC AUTO-ENTMCNC: 35.5 G/DL (ref 28.4–34.8)
MCV RBC AUTO: 86.9 FL (ref 82.6–102.9)
NRBC BLD-RTO: 0 PER 100 WBC
PLATELET # BLD AUTO: ABNORMAL K/UL (ref 138–453)
PLATELET, FLUORESCENCE: 140 K/UL (ref 138–453)
PLATELETS.RETICULATED NFR BLD AUTO: 3.3 % (ref 1.1–10.3)
POTASSIUM SERPL-SCNC: 4.1 MMOL/L (ref 3.7–5.3)
RBC # BLD AUTO: 4.44 M/UL (ref 4.21–5.77)
SODIUM SERPL-SCNC: 140 MMOL/L (ref 136–145)
WBC OTHER # BLD: 5.8 K/UL (ref 3.5–11.3)

## 2024-03-21 PROCEDURE — 95720 EEG PHY/QHP EA INCR W/VEEG: CPT | Performed by: PSYCHIATRY & NEUROLOGY

## 2024-03-21 PROCEDURE — 6370000000 HC RX 637 (ALT 250 FOR IP): Performed by: PHYSICIAN ASSISTANT

## 2024-03-21 PROCEDURE — 99231 SBSQ HOSP IP/OBS SF/LOW 25: CPT | Performed by: PSYCHIATRY & NEUROLOGY

## 2024-03-21 PROCEDURE — 80048 BASIC METABOLIC PNL TOTAL CA: CPT

## 2024-03-21 PROCEDURE — 82947 ASSAY GLUCOSE BLOOD QUANT: CPT

## 2024-03-21 PROCEDURE — 85055 RETICULATED PLATELET ASSAY: CPT

## 2024-03-21 PROCEDURE — 6370000000 HC RX 637 (ALT 250 FOR IP)

## 2024-03-21 PROCEDURE — 2580000003 HC RX 258

## 2024-03-21 PROCEDURE — 6360000002 HC RX W HCPCS

## 2024-03-21 PROCEDURE — 85027 COMPLETE CBC AUTOMATED: CPT

## 2024-03-21 PROCEDURE — 73630 X-RAY EXAM OF FOOT: CPT

## 2024-03-21 PROCEDURE — 99221 1ST HOSP IP/OBS SF/LOW 40: CPT | Performed by: PHYSICIAN ASSISTANT

## 2024-03-21 PROCEDURE — 95714 VEEG EA 12-26 HR UNMNTR: CPT

## 2024-03-21 PROCEDURE — 83036 HEMOGLOBIN GLYCOSYLATED A1C: CPT

## 2024-03-21 PROCEDURE — 6370000000 HC RX 637 (ALT 250 FOR IP): Performed by: PSYCHIATRY & NEUROLOGY

## 2024-03-21 PROCEDURE — 2060000000 HC ICU INTERMEDIATE R&B

## 2024-03-21 PROCEDURE — 36415 COLL VENOUS BLD VENIPUNCTURE: CPT

## 2024-03-21 RX ORDER — LACOSAMIDE 50 MG/1
50 TABLET ORAL 2 TIMES DAILY
Status: DISCONTINUED | OUTPATIENT
Start: 2024-03-21 | End: 2024-03-23

## 2024-03-21 RX ORDER — LEVETIRACETAM 500 MG/1
500 TABLET ORAL 2 TIMES DAILY
Status: DISCONTINUED | OUTPATIENT
Start: 2024-03-21 | End: 2024-03-23

## 2024-03-21 RX ORDER — INSULIN GLARGINE 100 [IU]/ML
80 INJECTION, SOLUTION SUBCUTANEOUS 2 TIMES DAILY
Status: CANCELLED | OUTPATIENT
Start: 2024-03-21

## 2024-03-21 RX ORDER — INSULIN GLARGINE 100 [IU]/ML
40 INJECTION, SOLUTION SUBCUTANEOUS NIGHTLY
Status: COMPLETED | OUTPATIENT
Start: 2024-03-21 | End: 2024-03-21

## 2024-03-21 RX ORDER — INSULIN LISPRO 100 [IU]/ML
0-4 INJECTION, SOLUTION INTRAVENOUS; SUBCUTANEOUS NIGHTLY
Status: DISCONTINUED | OUTPATIENT
Start: 2024-03-21 | End: 2024-03-23 | Stop reason: HOSPADM

## 2024-03-21 RX ORDER — INSULIN LISPRO 100 [IU]/ML
0-16 INJECTION, SOLUTION INTRAVENOUS; SUBCUTANEOUS
Status: DISCONTINUED | OUTPATIENT
Start: 2024-03-21 | End: 2024-03-23 | Stop reason: HOSPADM

## 2024-03-21 RX ORDER — INSULIN LISPRO 100 [IU]/ML
15 INJECTION, SOLUTION INTRAVENOUS; SUBCUTANEOUS 2 TIMES DAILY WITH MEALS
Status: CANCELLED | OUTPATIENT
Start: 2024-03-21

## 2024-03-21 RX ADMIN — SODIUM CHLORIDE, PRESERVATIVE FREE 10 ML: 5 INJECTION INTRAVENOUS at 07:52

## 2024-03-21 RX ADMIN — INSULIN LISPRO 1 UNITS: 100 INJECTION, SOLUTION INTRAVENOUS; SUBCUTANEOUS at 11:57

## 2024-03-21 RX ADMIN — LEVETIRACETAM 500 MG: 500 TABLET, FILM COATED ORAL at 19:55

## 2024-03-21 RX ADMIN — CARIPRAZINE 3 MG: 3 CAPSULE, GELATIN COATED ORAL at 11:57

## 2024-03-21 RX ADMIN — INSULIN LISPRO 8 UNITS: 100 INJECTION, SOLUTION INTRAVENOUS; SUBCUTANEOUS at 16:37

## 2024-03-21 RX ADMIN — ENOXAPARIN SODIUM 30 MG: 100 INJECTION SUBCUTANEOUS at 19:55

## 2024-03-21 RX ADMIN — LISINOPRIL 20 MG: 20 TABLET ORAL at 19:56

## 2024-03-21 RX ADMIN — METOPROLOL TARTRATE 50 MG: 50 TABLET, FILM COATED ORAL at 07:52

## 2024-03-21 RX ADMIN — ENOXAPARIN SODIUM 30 MG: 100 INJECTION SUBCUTANEOUS at 07:51

## 2024-03-21 RX ADMIN — SODIUM CHLORIDE, PRESERVATIVE FREE 10 ML: 5 INJECTION INTRAVENOUS at 19:56

## 2024-03-21 RX ADMIN — LEVETIRACETAM 1500 MG: 500 TABLET, FILM COATED ORAL at 07:51

## 2024-03-21 RX ADMIN — ROPINIROLE HYDROCHLORIDE 1.5 MG: 1 TABLET, FILM COATED ORAL at 14:52

## 2024-03-21 RX ADMIN — INSULIN LISPRO 1 UNITS: 100 INJECTION, SOLUTION INTRAVENOUS; SUBCUTANEOUS at 07:50

## 2024-03-21 RX ADMIN — ROPINIROLE HYDROCHLORIDE 1.5 MG: 1 TABLET, FILM COATED ORAL at 11:57

## 2024-03-21 RX ADMIN — METOPROLOL TARTRATE 50 MG: 50 TABLET, FILM COATED ORAL at 19:56

## 2024-03-21 RX ADMIN — INSULIN GLARGINE 40 UNITS: 100 INJECTION, SOLUTION SUBCUTANEOUS at 19:58

## 2024-03-21 RX ADMIN — GABAPENTIN 300 MG: 600 TABLET ORAL at 07:52

## 2024-03-21 RX ADMIN — ROPINIROLE HYDROCHLORIDE 1.5 MG: 1 TABLET, FILM COATED ORAL at 19:56

## 2024-03-21 RX ADMIN — GABAPENTIN 300 MG: 600 TABLET ORAL at 19:56

## 2024-03-21 RX ADMIN — LACOSAMIDE 50 MG: 50 TABLET, FILM COATED ORAL at 19:56

## 2024-03-21 RX ADMIN — LACOSAMIDE 150 MG: 100 TABLET, FILM COATED ORAL at 07:51

## 2024-03-21 RX ADMIN — GABAPENTIN 300 MG: 600 TABLET ORAL at 14:52

## 2024-03-21 RX ADMIN — LISINOPRIL 20 MG: 20 TABLET ORAL at 07:51

## 2024-03-21 ASSESSMENT — ENCOUNTER SYMPTOMS
ABDOMINAL PAIN: 0
COLOR CHANGE: 0
NAUSEA: 0
RHINORRHEA: 0
SHORTNESS OF BREATH: 0
CONSTIPATION: 0
DIARRHEA: 0
COUGH: 0
ABDOMINAL DISTENTION: 0
PHOTOPHOBIA: 0
BACK PAIN: 0

## 2024-03-21 NOTE — FLOWSHEET NOTE
Pt's SpO2 dropped to 75 % while sleeping with good pleth wave form. Pt states he has history of sleep apnea but had surgery for it. Writer informed pt of low SpO2 and need to apply oxygen per nasal cannula. Pt agreeable.

## 2024-03-21 NOTE — PROCEDURES
EEG was reviewed up to 11 PM    The interictal EEG was abnormal.  There is continuous attenuation of faster frequencies on the right hemisphere suggestive of underlying focal cerebral dysfunction in the right hemisphere.  Continuous theta frequencies is suggestive of mild encephalopathy.     Electronically signed by Lui Hirsch MD on 3/20/2024 at 11:09 PM

## 2024-03-21 NOTE — PROCEDURES
LONG-TERM EEG-VIDEO MONITORING   CLINICAL NEUROPHYSIOLOGY LABORATORY  DEPARTMENT OF NEUROLOGY  [facility]     Patient: Nicolas Mackenzie  Age: 53 y.o.  MRN: [id number]    Referring Physician: Puneet Sue MD  History: The patient is a 53 y.o. male who presented breakthrough seizure/encephalopathy. This long-term video-EEG monitoring study was performed to determine the nature of the patient's clinical events. The patient is on neuroactive medications.   Nicolas Mackenzie   Current Facility-Administered Medications   Medication Dose Route Frequency Provider Last Rate Last Admin    lacosamide (VIMPAT) tablet 150 mg  150 mg Oral BID Félix Davila MD   150 mg at 03/21/24 0751    levETIRAcetam (KEPPRA) tablet 1,500 mg  1,500 mg Oral BID Félix Davila MD   1,500 mg at 03/21/24 0751    lisinopril (PRINIVIL;ZESTRIL) tablet 20 mg  20 mg Oral BID Félix Davila MD   20 mg at 03/21/24 0751    metoprolol tartrate (LOPRESSOR) tablet 50 mg  50 mg Oral BID Félix Davila MD   50 mg at 03/21/24 0752    tadalafil (CIALIS) tablet 10 mg (Patient Supplied)  10 mg Oral Every Other Day Félix Davila MD        gabapentin (NEURONTIN) tablet 300 mg  300 mg Oral TID Félix Davila MD   300 mg at 03/21/24 0752    sodium chloride flush 0.9 % injection 5-40 mL  5-40 mL IntraVENous 2 times per day Félix Davila MD   10 mL at 03/21/24 0752    sodium chloride flush 0.9 % injection 5-40 mL  5-40 mL IntraVENous PRN Félix Davila MD        0.9 % sodium chloride infusion   IntraVENous PRN Félix Davila MD        potassium chloride (KLOR-CON M) extended release tablet 40 mEq  40 mEq Oral PRN Félix Davila MD        Or    potassium bicarb-citric acid (EFFER-K) effervescent tablet 40 mEq  40 mEq Oral PRN Félix Davila MD        Or    potassium chloride 10 mEq/100 mL IVPB (Peripheral Line)  10 mEq IntraVENous PRN Félix Davila MD        magnesium sulfate 2000 mg in 50 mL IVPB premix  2,000 mg IntraVENous PRN Félix Davila MD         symmetrically to eye opening .  There was continuous right hemisphere theta frequencies.  During drowsiness, there were bursts of diffuse slowing and waxing and waning of the posterior dominant rhythm.  During stage II sleep symmetric V waves and sleep spindles were seen.  The appearance of diffuse delta activity was observed in slow wave sleep. Muscle atonia and frequent eye movement artifact was seen during periods of REM sleep.  No abnormalities were activated by sleep. The EKG channel revealed no abnormalities.    Ictal EEG Recording / Patient Events: During this period the patient had no events or seizures.    Summary: During this day of recording no events were recorded. The interictal EEG was abnormal.  There is continuous attenuation of faster frequencies on the right hemisphere suggestive of underlying focal cerebral dysfunction in the right hemisphere.  Continuous theta frequencies is suggestive of mild encephalopathy.  Monitoring was continued in order to record the patient's typical events. The EKG channel revealed no abnormalities.    Lui Hirsch MD  [facility] [place]    Please note this is a preliminary report and updated daily. The final report will have a summary of behavior and electrographic findings with clinical correlation.

## 2024-03-21 NOTE — CONSULTS
[facility]  Office: [phone]  Artemio Briggs DO, Gamaliel Mazariegos DO, Jean Conn DO, Jan Alfredo DO, Clau Dickey MD, Francesca Chen MD, Irving Nguyen MD, Pebbles Rodríguez MD,  Kvng Martinez MD, Marine Arreaga MD, Jumana Resendiz MD,  Jossy Díaz DO, Jabier Cloud MD, Orestes Soto MD, Home Briggs DO, Gladys Kevin MD,  Bogdan Clark DO, Nery Whatley MD, Jackie Ruelas MD, Ange Garcia MD, David Robertson MD,  Sukhjinder Euceda MD, Ling Fields MD, Taryn Jimenez MD, Nasreen Bruner MD, Silvio Garcia MD, Garrett López MD, Froy Chavez DO, Angel Landon DO, Dmitri Avila MD,  Alfonzo Hannon MD, Shirley Waterhouse, CNP,  Daina Burroughs, CNP, Isaias Andrade, CNP,  Alice Baron, DNP, Disha Song, CNP, Crystal Stewart, CNP, Lisa Meyer CNP, Dasia Yao, CNP, Patricia Pickett, CNP, Courtney Cazares, PA-C, Alana Baumann PA-C, Linda Nayak, CNP, Felicity Willis, CNS, Nikky Kuo, CNP, Kim Velásquez, CNP, Tracy Schwab, CNP         [facility]   IN-PATIENT SERVICE   [facility]    CONSULTATION / HISTORY AND PHYSICAL EXAMINATION            Date:   3/21/2024  Patient name:  Nicolas Mackenzie  Date of admission:  3/20/2024  2:19 PM  MRN:   [id number]  Account:  [id number]  YOB: 1970  PCP:    Kesha Hutson MD  Room:   [facility]  Code Status:    Full Code    Physician Requesting Consult: Ramón Shelton DO    Reason for Consult:  Diabetes Management    Chief Complaint:     Seizures    History Obtained From:     patient, electronic medical record    History of Present Illness:     Nicolas Mackenzie is a 52 y/o male who presented for elective long-term EEG monitoring.  Patient has a history significant for intractable seizures, hypertension, hyperlipidemia, diabetes mellitus, history of DVT, and bipolar disorder.    Patient follows closely with neurology outpatient.  Patient has had multiple focal seizures with impaired  and in the inferior vermis. 4. There is mild atrophy. 5. This a retention cyst or polyp in the left maxillary sinus. 6. There are inflammatory changes in the ethmoid air cells bilaterally. **This report has been created using voice recognition software. It may contain minor errors which are inherent in voice recognition technology.** Final report electronically signed by DR ESTEFANIA LEVIN on 3/15/2024 4:31 PM      Assessment :      Hospital Problems             Last Modified POA    * (Principal) Seizure disorder (HCC) 3/20/2024 Yes    Seizure-like activity (HCC) 3/20/2024 Yes       Plan:     Seizure disorder - Management per primary    Type II DM -   A1c today 6.2%.    Patient takes Lantus 80 units BID and Humalog 30 units with meals at home  Recommend discontinuing current regimen   Will give Lantus 40 units this evening and evaluate BG in the morning  Since patient has strict diabetic diet inpatient, will utilize high dose sliding scale for now and titrate as needed   Glucose checks and hypogylcemia protocol in place    Patient also complaining of left foot pain, x-ray ordered.      Consultations:   IP CONSULT TO IV TEAM  IP CONSULT TO INTERNAL MEDICINE      ASHLEY Cheng  3/21/2024  9:16 AM    Copy sent to Kesha Sánchez MD

## 2024-03-21 NOTE — FLOWSHEET NOTE
Writer informed Dr. Asher with neurology of no current lab orders from today or tomorrow, no blood sugar checks order and pt on 80 units of Lantus twice a day also not ordered. New orders received for labwork in AM, Lantus 27 units and medicine consult. Pt frustrated about low amount of Lantus and states every time he comes to the hospital his insulin regiment is incorrect and blood sugars go to 400.

## 2024-03-21 NOTE — CARE COORDINATION
Case Management Assessment  Initial Evaluation    Date/Time of Evaluation: 3/21/2024 4:59 PM  Assessment Completed by: Xenia Espinosa RN    If patient is discharged prior to next notation, then this note serves as note for discharge by case management.    Patient Name: Nicolas Mackenzie                   YOB: 1970  Diagnosis: Seizure disorder (HCC) [G40.909]                   Date / Time: 3/20/2024  2:19 PM    Patient Admission Status: Inpatient   Readmission Risk (Low < 19, Mod (19-27), High > 27): Readmission Risk Score: 9.5    Current PCP: Kesha Hutson MD  PCP verified by CM? Yes    Chart Reviewed: Yes      History Provided by: Patient  Patient Orientation: Alert and Oriented    Patient Cognition: Alert    Hospitalization in the last 30 days (Readmission):  No    If yes, Readmission Assessment in  Navigator will be completed.    Advance Directives:      Code Status: Full Code   Patient's Primary Decision Maker is: Legal Next of Kin      Discharge Planning:    Patient lives with: Spouse/Significant Other Type of Home: House  Primary Care Giver: Family  Patient Support Systems include: Spouse/Significant Other   Current Financial resources:    Current community resources:    Current services prior to admission: Durable Medical Equipment            Current DME: Cane, Walker, Wheelchair            Type of Home Care services:  None    ADLS  Prior functional level: Assistance with the following:  Current functional level:      PT AM-PAC:   /24  OT AM-PAC:   /24    Family can provide assistance at DC: Yes  Would you like Case Management to discuss the discharge plan with any other family members/significant others, and if so, who?    Plans to Return to Present Housing: Yes  Other Identified Issues/Barriers to RETURNING to current housing: LTME  Potential Assistance needed at discharge: N/A            Potential DME:    Patient expects to discharge to: House  Plan for transportation at discharge:       Financial    Payor: MEDICARE / Plan: MEDICARE PART A AND B / Product Type: *No Product type* /     Does insurance require precert for SNF: No    Potential assistance Purchasing Medications: No  Meds-to-Beds request: Yes      [facility] - [place], [place] - [address] - P [phone] - F [phone]  [address]  [place] [address]  Phone: [phone] Fax: [phone]    [facility] - [place], [place] - [address] - P [phone] - F [phone]  [address]  [facility] [address]  Phone: [phone] Fax: [phone]    [facility] [place], [place] - [address] - P [phone] - F [phone]  [address]  [facility] [address]  Phone: [phone] Fax: [phone]      Notes:    Factors facilitating achievement of predicted outcomes: Cooperative and Pleasant    Barriers to discharge: LTME    Additional Case Management Notes: Plan is to return home with spouse who will transport.  On LTME    The Plan for Transition of Care is related to the following treatment goals of Seizure disorder (HCC) [G40.909]    IF APPLICABLE: The Patient and/or patient representative Nicolas and his family were provided with a choice of provider and agrees with the discharge plan. Freedom of choice list with basic dialogue that supports the patient's individualized plan of care/goals and shares the quality data associated with the providers was provided to:     Patient Representative Name:       The Patient and/or Patient Representative Agree with the Discharge Plan?  Yes    Xenia Espinosa RN  Case Management Department  Ph: [phone]

## 2024-03-21 NOTE — PROCEDURES
LONG-TERM EEG-VIDEO MONITORING   CLINICAL NEUROPHYSIOLOGY LABORATORY  DEPARTMENT OF NEUROLOGY  [facility]     Patient: Nicolas Mackenzie  Age: 53 y.o.  MRN: [id number]    Referring Physician: Puneet Sue MD  History: The patient is a 53 y.o. male who presented breakthrough seizure/encephalopathy. This long-term video-EEG monitoring study was performed to determine the nature of the patient's clinical events. The patient is on neuroactive medications.   Nicolas Mackenzie   Current Facility-Administered Medications   Medication Dose Route Frequency Provider Last Rate Last Admin    insulin glargine (LANTUS) injection vial 40 Units  40 Units SubCUTAneous BID Alana Baumann PA        cariprazine hcl (VRAYLAR) capsule 3 mg  3 mg Oral Daily Félix Davila MD   3 mg at 03/21/24 1157    rOPINIRole (REQUIP) tablet 1.5 mg  1.5 mg Oral TID Félix Davila MD   1.5 mg at 03/21/24 1956    insulin lispro (HUMALOG) injection vial 0-16 Units  0-16 Units SubCUTAneous TID WC Alana Baumann PA   8 Units at 03/21/24 1637    insulin lispro (HUMALOG) injection vial 0-4 Units  0-4 Units SubCUTAneous Nightly Alana Baumann PA        levETIRAcetam (KEPPRA) tablet 500 mg  500 mg Oral BID Jenniferri, Ramón, DO   500 mg at 03/21/24 1955    lacosamide (VIMPAT) tablet 50 mg  50 mg Oral BID Jenniferri, Ramón, DO   50 mg at 03/21/24 1956    lisinopril (PRINIVIL;ZESTRIL) tablet 20 mg  20 mg Oral BID Félix Davila MD   20 mg at 03/21/24 1956    metoprolol tartrate (LOPRESSOR) tablet 50 mg  50 mg Oral BID Félix Davila MD   50 mg at 03/21/24 1956    gabapentin (NEURONTIN) tablet 300 mg  300 mg Oral TID Félix Davila MD   300 mg at 03/21/24 1956    sodium chloride flush 0.9 % injection 5-40 mL  5-40 mL IntraVENous 2 times per day Félix Davila MD   10 mL at 03/21/24 1956    sodium chloride flush 0.9 % injection 5-40 mL  5-40 mL IntraVENous PRN Félix Davila MD        0.9 % sodium chloride infusion   IntraVENous PRN Félix Davila MD        potassium

## 2024-03-21 NOTE — PROGRESS NOTES
[facility] Neurology   IN-PATIENT SERVICE   [facility]    Progress Note             Date:   3/21/2024  Patient name:  Nicolas Mackenzie  Date of admission:  3/20/2024  2:19 PM  MRN:   [id number]  Account:  [id number]  YOB: 1970  PCP:    Kesha Hutson MD  Room:   [address] [place] [address]  Code Status:    Full Code    Chief Complaint:     seizures    Interval hx:     The patient was seen and examined at bedside. Is vitally stable, although was desatting while sleeping, alert and oriented x 3. No acute events overnight.    No seizure overnight     Was having mild left temporal headache around 6 pm and though he might have a seizure      EEG - There is continuous attenuation of faster frequencies on the right hemisphere suggestive of underlying focal cerebral dysfunction in the right hemisphere. Continuous theta frequencies is suggestive of mild encephalopathy.     Insulin home dose was resume as 27 units with low dose insulin sliding scale       Brief History of Present Illness:     The patient is a 53 y.o. male, H/O R frontal subarachnoid cyst s/p R craniectomy (11/2012), intractable seizures post-craniectomy, L hemithyroidectomy d/t follicular adenoma, HTN, HLD, DM with neuropathy, DVT/PE s/p IVC filter placement (2007), bipolar disorder, chronic back pain, non-alcoholic fatty liver, who was admitted on 3/20/2024 for elective long-term EEG monitoring     Was last seen by a neurologist Dr. Sue on November 2023 for management of seizures, was noted to have focal seizure with impaired awareness, reports that he has about 1 seizure a week last seizure was on Saturday lasting about 5 minutes followed by postictal phase of 3 hours, states that he is stuttering more recently, has word finding difficulty.  His medications include Keppra, Vimpat, and gabapentin which she takes for neuropathy.  Dose of Vimpat was increased from 250 milligram twice daily to 300 mg twice daily from November and he

## 2024-03-21 NOTE — PLAN OF CARE
Problem: Discharge Planning  Goal: Discharge to home or other facility with appropriate resources  3/21/2024 0521 by Angela Sandoval RN  Outcome: Progressing  3/21/2024 0521 by Angela Sandoval RN  Outcome: Progressing  3/20/2024 1541 by Emely Vallejo RN  Outcome: Progressing     Problem: Safety - Adult  Goal: Free from fall injury  3/21/2024 0521 by Angela Sandoval RN  Outcome: Progressing  Flowsheets (Taken 3/20/2024 2100)  Free From Fall Injury:   Instruct family/caregiver on patient safety   Based on caregiver fall risk screen, instruct family/caregiver to ask for assistance with transferring infant if caregiver noted to have fall risk factors  3/21/2024 0521 by Angela Sandoval RN  Outcome: Progressing  Flowsheets (Taken 3/20/2024 2100)  Free From Fall Injury:   Instruct family/caregiver on patient safety   Based on caregiver fall risk screen, instruct family/caregiver to ask for assistance with transferring infant if caregiver noted to have fall risk factors  3/20/2024 1541 by Emely Vallejo RN  Outcome: Progressing     Problem: Neurosensory - Adult  Goal: Achieves stable or improved neurological status  3/21/2024 0521 by Angela Sandoval RN  Outcome: Progressing  3/21/2024 0521 by Angela Sandoval RN  Outcome: Progressing  Goal: Absence of seizures  3/21/2024 0521 by Angela Sandoval RN  Outcome: Progressing  3/21/2024 0521 by Angela Sandoval RN  Outcome: Progressing  Goal: Remains free of injury related to seizures activity  3/21/2024 0521 by Angela Sandoval RN  Outcome: Progressing  3/21/2024 0521 by Angela Sandoval RN  Outcome: Progressing  Goal: Achieves maximal functionality and self care  3/21/2024 0521 by Angela Sandoval RN  Outcome: Progressing  3/21/2024 0521 by Angela Sandoval RN  Outcome: Progressing     Problem: Respiratory - Adult  Goal: Achieves optimal ventilation and oxygenation  3/21/2024 0521 by Jaime  Progressing  Goal: Maintains or returns to baseline bowel function  Outcome: Progressing  Goal: Maintains adequate nutritional intake  Outcome: Progressing     Problem: Genitourinary - Adult  Goal: Absence of urinary retention  Outcome: Progressing     Problem: Infection - Adult  Goal: Absence of infection at discharge  Outcome: Progressing  Goal: Absence of infection during hospitalization  Outcome: Progressing  Goal: Absence of fever/infection during anticipated neutropenic period  Outcome: Progressing     Problem: Metabolic/Fluid and Electrolytes - Adult  Goal: Electrolytes maintained within normal limits  Outcome: Progressing  Goal: Hemodynamic stability and optimal renal function maintained  Outcome: Progressing  Goal: Glucose maintained within prescribed range  Outcome: Progressing     Problem: Hematologic - Adult  Goal: Maintains hematologic stability  Outcome: Progressing

## 2024-03-21 NOTE — PROCEDURES
EEG was reviewed up to 7 PM    The interictal EEG was abnormal.  There is continuous attenuation of faster frequencies on the right hemisphere suggestive of underlying focal cerebral dysfunction in the right hemisphere.  Continuous theta frequencies is suggestive of mild encephalopathy.     Electronically signed by Lui Hirsch MD on 3/21/2024 at 7:09 PM

## 2024-03-21 NOTE — PROGRESS NOTES
Physician Progress Note      PATIENT:               AMALIA RAMIREZ  CSN #:                  [id number]  :                       1970  ADMIT DATE:       3/20/2024 2:19 PM  DISCH DATE:  RESPONDING  PROVIDER #:        Ramón BANUELOS DO          QUERY TEXT:    Pt admitted with seizure for LTME. 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 MRI on 3/9/2024 showed Chronic mild right frontal   encephalomalacia  If possible, please document in progress notes and discharge   summary after study the etiology of the seizure disorder:    The medical record reflects the following:  Risk Factors: age, seizures, DM  Clinical Indicators:  admitted with seizure for LTME. Pt noted to have a   documented history of Rt frontal subarachnoid cyst s/p a craniectomy with   intractable seizures post craniectomy..  Treatment: LTME, AED monitoring, Neuro checks    Thank You Nathaniel HOLLOWAY BSN CCDS  Email [email or web address]  Cell [phone]  office hours M-F 6am to 2:30p  Options provided:  -- Seizure disorder is a sequela  of craniectomy surgery  -- Seizure Disorder is a sequela of Subarachnoid cyst  -- Other - I will add my own diagnosis  -- Disagree - Not applicable / Not valid  -- Disagree - Clinically unable to determine / Unknown  -- Refer to Clinical Documentation Reviewer    PROVIDER RESPONSE TEXT:    This patients Seizure disorder is is a sequela of Subarachnoid cyst    Query created by: Kerry Jacobs on 3/21/2024 7:26 AM      Electronically signed by:  Ramón BANUELOS DO 3/21/2024 3:15 PM

## 2024-03-21 NOTE — PLAN OF CARE
Problem: Discharge Planning  Goal: Discharge to home or other facility with appropriate resources  3/21/2024 0904 by Emely Vallejo RN  Outcome: Progressing  3/21/2024 0521 by Angela Sandoval RN  Outcome: Progressing     Problem: Safety - Adult  Goal: Free from fall injury  3/21/2024 0904 by Emely Vallejo RN  Outcome: Progressing  3/21/2024 0521 by Angela Sandoval RN  Outcome: Progressing  Flowsheets (Taken 3/20/2024 2100)  Free From Fall Injury:   Instruct family/caregiver on patient safety   Based on caregiver fall risk screen, instruct family/caregiver to ask for assistance with transferring infant if caregiver noted to have fall risk factors     Problem: Neurosensory - Adult  Goal: Achieves stable or improved neurological status  3/21/2024 0904 by Emely Vallejo RN  Outcome: Progressing  3/21/2024 0521 by Angela Sandoval RN  Outcome: Progressing  Goal: Absence of seizures  3/21/2024 0904 by Emely Vallejo RN  Outcome: Progressing  3/21/2024 0521 by Angela Sandoval RN  Outcome: Progressing  Goal: Remains free of injury related to seizures activity  3/21/2024 0904 by Emely Vallejo RN  Outcome: Progressing  3/21/2024 0521 by Angela Sandoval RN  Outcome: Progressing  Goal: Achieves maximal functionality and self care  3/21/2024 0904 by Emely Vallejo RN  Outcome: Progressing  3/21/2024 0521 by Angela Sandoval RN  Outcome: Progressing     Problem: Respiratory - Adult  Goal: Achieves optimal ventilation and oxygenation  3/21/2024 0904 by Emely Vallejo RN  Outcome: Progressing  3/21/2024 0521 by Angela Sandoval RN  Outcome: Progressing     Problem: Cardiovascular - Adult  Goal: Maintains optimal cardiac output and hemodynamic stability  3/21/2024 0904 by Emely Vallejo RN  Outcome: Progressing  3/21/2024 0521 by Angela Sandoval RN  Outcome: Progressing  Goal: Absence of cardiac dysrhythmias or at baseline  3/21/2024 0904 by Emely Vallejo,  "I have reviewed the surgical (or preoperative) H&P that is linked to this encounter, and examined the patient. There are no significant changes    Clinical Conditions Present on Arrival:  Clinically Significant Risk Factors Present on Admission          # Hypercalcemia: Highest Ca = 10.2 mg/dL in last 30 days, will monitor as appropriate       # Drug Induced Coagulation Defect: home medication list includes an anticoagulant medication   # Severe Obesity: Estimated body mass index is 55.47 kg/m  as calculated from the following:    Height as of this encounter: 1.651 m (5' 5\").    Weight as of this encounter: 151.2 kg (333 lb 5.4 oz).       " Progressing  3/21/2024 0521 by Angela Sandoval RN  Outcome: Progressing     Problem: Infection - Adult  Goal: Absence of infection at discharge  3/21/2024 0904 by Emely Vallejo RN  Outcome: Progressing  3/21/2024 0521 by Angela Sandoval RN  Outcome: Progressing  Goal: Absence of infection during hospitalization  3/21/2024 0904 by Emely Vallejo RN  Outcome: Progressing  3/21/2024 0521 by Angela Sandoval RN  Outcome: Progressing  Goal: Absence of fever/infection during anticipated neutropenic period  3/21/2024 0904 by Emely Vallejo RN  Outcome: Progressing  3/21/2024 0521 by Angela Sandoval RN  Outcome: Progressing     Problem: Metabolic/Fluid and Electrolytes - Adult  Goal: Electrolytes maintained within normal limits  3/21/2024 0904 by Emely Vallejo RN  Outcome: Progressing  3/21/2024 0521 by Angela Sandoval RN  Outcome: Progressing  Goal: Hemodynamic stability and optimal renal function maintained  3/21/2024 0904 by Emely Vallejo RN  Outcome: Progressing  3/21/2024 0521 by Angela Sandoval RN  Outcome: Progressing  Goal: Glucose maintained within prescribed range  3/21/2024 0904 by Emely Vallejo RN  Outcome: Progressing  3/21/2024 0521 by Angela Sandoval RN  Outcome: Progressing     Problem: Hematologic - Adult  Goal: Maintains hematologic stability  3/21/2024 0904 by Emely Vallejo RN  Outcome: Progressing  3/21/2024 0521 by Angela Sandoval RN  Outcome: Progressing

## 2024-03-22 PROBLEM — E11.9 TYPE 2 DIABETES MELLITUS WITHOUT COMPLICATION, WITH LONG-TERM CURRENT USE OF INSULIN (HCC): Status: ACTIVE | Noted: 2024-03-22

## 2024-03-22 PROBLEM — Z79.4 TYPE 2 DIABETES MELLITUS WITHOUT COMPLICATION, WITH LONG-TERM CURRENT USE OF INSULIN (HCC): Status: ACTIVE | Noted: 2024-03-22

## 2024-03-22 LAB
ANION GAP SERPL CALCULATED.3IONS-SCNC: 9 MMOL/L (ref 9–16)
BUN SERPL-MCNC: 15 MG/DL (ref 6–20)
CALCIUM SERPL-MCNC: 8.7 MG/DL (ref 8.6–10.4)
CHLORIDE SERPL-SCNC: 106 MMOL/L (ref 98–107)
CO2 SERPL-SCNC: 24 MMOL/L (ref 20–31)
CREAT SERPL-MCNC: 0.8 MG/DL (ref 0.7–1.2)
ERYTHROCYTE [DISTWIDTH] IN BLOOD BY AUTOMATED COUNT: 13.2 % (ref 11.8–14.4)
GFR SERPL CREATININE-BSD FRML MDRD: >60 ML/MIN/1.73M2
GLUCOSE BLD-MCNC: 206 MG/DL (ref 75–110)
GLUCOSE BLD-MCNC: 245 MG/DL (ref 75–110)
GLUCOSE BLD-MCNC: 261 MG/DL (ref 75–110)
GLUCOSE BLD-MCNC: 275 MG/DL (ref 75–110)
GLUCOSE SERPL-MCNC: 229 MG/DL (ref 74–99)
HCT VFR BLD AUTO: 40.7 % (ref 40.7–50.3)
HGB BLD-MCNC: 13.9 G/DL (ref 13–17)
MCH RBC QN AUTO: 30.1 PG (ref 25.2–33.5)
MCHC RBC AUTO-ENTMCNC: 34.2 G/DL (ref 28.4–34.8)
MCV RBC AUTO: 88.1 FL (ref 82.6–102.9)
NRBC BLD-RTO: 0 PER 100 WBC
PLATELET # BLD AUTO: NORMAL K/UL (ref 138–453)
PLATELET, FLUORESCENCE: 138 K/UL (ref 138–453)
PLATELETS.RETICULATED NFR BLD AUTO: 3.1 % (ref 1.1–10.3)
POTASSIUM SERPL-SCNC: 4.1 MMOL/L (ref 3.7–5.3)
RBC # BLD AUTO: 4.62 M/UL (ref 4.21–5.77)
SODIUM SERPL-SCNC: 139 MMOL/L (ref 136–145)
WBC OTHER # BLD: 5.5 K/UL (ref 3.5–11.3)

## 2024-03-22 PROCEDURE — 80048 BASIC METABOLIC PNL TOTAL CA: CPT

## 2024-03-22 PROCEDURE — 36415 COLL VENOUS BLD VENIPUNCTURE: CPT

## 2024-03-22 PROCEDURE — 6370000000 HC RX 637 (ALT 250 FOR IP): Performed by: PHYSICIAN ASSISTANT

## 2024-03-22 PROCEDURE — 99231 SBSQ HOSP IP/OBS SF/LOW 25: CPT | Performed by: PHYSICIAN ASSISTANT

## 2024-03-22 PROCEDURE — 99231 SBSQ HOSP IP/OBS SF/LOW 25: CPT | Performed by: PSYCHIATRY & NEUROLOGY

## 2024-03-22 PROCEDURE — 6360000002 HC RX W HCPCS

## 2024-03-22 PROCEDURE — 85055 RETICULATED PLATELET ASSAY: CPT

## 2024-03-22 PROCEDURE — 6370000000 HC RX 637 (ALT 250 FOR IP)

## 2024-03-22 PROCEDURE — 95714 VEEG EA 12-26 HR UNMNTR: CPT

## 2024-03-22 PROCEDURE — 82947 ASSAY GLUCOSE BLOOD QUANT: CPT

## 2024-03-22 PROCEDURE — 2580000003 HC RX 258

## 2024-03-22 PROCEDURE — 85027 COMPLETE CBC AUTOMATED: CPT

## 2024-03-22 PROCEDURE — 2060000000 HC ICU INTERMEDIATE R&B

## 2024-03-22 PROCEDURE — 95720 EEG PHY/QHP EA INCR W/VEEG: CPT | Performed by: PSYCHIATRY & NEUROLOGY

## 2024-03-22 PROCEDURE — 6370000000 HC RX 637 (ALT 250 FOR IP): Performed by: PSYCHIATRY & NEUROLOGY

## 2024-03-22 RX ORDER — INSULIN GLARGINE 100 [IU]/ML
40 INJECTION, SOLUTION SUBCUTANEOUS 2 TIMES DAILY
Status: DISCONTINUED | OUTPATIENT
Start: 2024-03-22 | End: 2024-03-23 | Stop reason: HOSPADM

## 2024-03-22 RX ADMIN — LEVETIRACETAM 500 MG: 500 TABLET, FILM COATED ORAL at 09:54

## 2024-03-22 RX ADMIN — ROPINIROLE HYDROCHLORIDE 1.5 MG: 1 TABLET, FILM COATED ORAL at 13:16

## 2024-03-22 RX ADMIN — METOPROLOL TARTRATE 50 MG: 50 TABLET, FILM COATED ORAL at 20:18

## 2024-03-22 RX ADMIN — CARIPRAZINE 3 MG: 3 CAPSULE, GELATIN COATED ORAL at 09:54

## 2024-03-22 RX ADMIN — LACOSAMIDE 50 MG: 50 TABLET, FILM COATED ORAL at 11:05

## 2024-03-22 RX ADMIN — ENOXAPARIN SODIUM 30 MG: 100 INJECTION SUBCUTANEOUS at 20:18

## 2024-03-22 RX ADMIN — INSULIN GLARGINE 40 UNITS: 100 INJECTION, SOLUTION SUBCUTANEOUS at 20:16

## 2024-03-22 RX ADMIN — INSULIN LISPRO 4 UNITS: 100 INJECTION, SOLUTION INTRAVENOUS; SUBCUTANEOUS at 09:53

## 2024-03-22 RX ADMIN — GABAPENTIN 300 MG: 600 TABLET ORAL at 13:17

## 2024-03-22 RX ADMIN — LISINOPRIL 20 MG: 20 TABLET ORAL at 09:55

## 2024-03-22 RX ADMIN — GABAPENTIN 300 MG: 600 TABLET ORAL at 09:55

## 2024-03-22 RX ADMIN — INSULIN LISPRO 4 UNITS: 100 INJECTION, SOLUTION INTRAVENOUS; SUBCUTANEOUS at 13:18

## 2024-03-22 RX ADMIN — SODIUM CHLORIDE, PRESERVATIVE FREE 10 ML: 5 INJECTION INTRAVENOUS at 09:59

## 2024-03-22 RX ADMIN — ROPINIROLE HYDROCHLORIDE 1.5 MG: 1 TABLET, FILM COATED ORAL at 09:55

## 2024-03-22 RX ADMIN — METOPROLOL TARTRATE 50 MG: 50 TABLET, FILM COATED ORAL at 09:54

## 2024-03-22 RX ADMIN — INSULIN LISPRO 8 UNITS: 100 INJECTION, SOLUTION INTRAVENOUS; SUBCUTANEOUS at 19:11

## 2024-03-22 RX ADMIN — ENOXAPARIN SODIUM 30 MG: 100 INJECTION SUBCUTANEOUS at 09:53

## 2024-03-22 RX ADMIN — SODIUM CHLORIDE, PRESERVATIVE FREE 10 ML: 5 INJECTION INTRAVENOUS at 20:19

## 2024-03-22 RX ADMIN — ROPINIROLE HYDROCHLORIDE 1.5 MG: 1 TABLET, FILM COATED ORAL at 20:20

## 2024-03-22 RX ADMIN — LISINOPRIL 20 MG: 20 TABLET ORAL at 20:18

## 2024-03-22 RX ADMIN — INSULIN GLARGINE 40 UNITS: 100 INJECTION, SOLUTION SUBCUTANEOUS at 09:54

## 2024-03-22 ASSESSMENT — ENCOUNTER SYMPTOMS
ABDOMINAL PAIN: 0
SHORTNESS OF BREATH: 0
RHINORRHEA: 0
PHOTOPHOBIA: 0
COLOR CHANGE: 0
NAUSEA: 0
SORE THROAT: 0
COUGH: 0
CONSTIPATION: 0
ABDOMINAL DISTENTION: 0

## 2024-03-22 NOTE — PROGRESS NOTES
[facility]  Office: [phone]  Artemio Briggs DO, Gamaliel Mazariegos DO, Jean oCnn DO, Jan Alfredo DO, Clau Dickey MD, Francesca Chen MD, Irving Nguyen MD, Pebbles Rodríguez MD,  Kvng Martinez MD, Marine Arreaga MD, Jumana Resendiz MD,  Jossy Díaz DO, Jabier Cloud MD, Orestes Soto MD, Home Briggs DO, Gladys Kevin MD,  Bogdan Clark DO, Nery Whatley MD, Jackie Ruelas MD, Ange Garcia MD, David Robertson MD,  Sukhjinder Euceda MD, Ling Fields MD, Taryn Jimenez MD, Nasreen Bruner MD, Silvio Garcia MD, Garrett López MD, Froy Chavez DO, Angel Landon DO, Dmitri Avila MD,  Alfonzo Hannon MD, Shirley Waterhouse, CNP,  Daina Burroughs, CNP, Isaias Andrade, CNP,  Alice Baron, DNP, Disha Song, CNP, Crystal Stewart, CNP, Lisa Meyer CNP, Dasia Yao, CNP, Patricia Pickett, CNP, Courtney Cazares, PA-C, Alana Baumann, PA-C, Linda Nayak, CNP, Felicity Willis, CNS, Nikky Kuo, CNP, Kim Velásquez, CNP, Tracy Schwab, CNP         [facility]   IN-PATIENT SERVICE   [facility]    Progress Note    3/22/2024    7:26 AM    Name:   Nicolas Mackenzie  MRN:     [id number]     Acct:      [id number]   Room:   0139/0139-01   Day:  2  Admit Date:  3/20/2024  2:19 PM    PCP:   Kesha Hutson MD  Code Status:  Full Code    Subjective:     C/C: Seizures  Interval History Status: not changed.     Patient seen and examined at bedside. No acute events overnight. Patient reports he is feeling well and has no complaints.     Brief History:     Nicolas Mackenzie is a 52 y/o male who presented for elective long-term EEG monitoring.  Patient has a history significant for intractable seizures, hypertension, hyperlipidemia, diabetes mellitus, history of DVT, and bipolar disorder.     Patient follows closely with neurology outpatient.  Patient has had multiple focal seizures with impaired awareness over the last several months.  He had several medication

## 2024-03-22 NOTE — PROGRESS NOTES
UPPER GASTROINTESTINAL ENDOSCOPY  2012    UPPER GASTROINTESTINAL ENDOSCOPY  2016    Dr. Gregorio    UPPER GASTROINTESTINAL ENDOSCOPY Left 10/22/2019    EGD DILATION SAVORY performed by Tk Gregorio MD at [facility]    UPPER GASTROINTESTINAL ENDOSCOPY Left 10/22/2019    EGD BIOPSY performed by Tk Gregorio MD at [facility]    UPPER GASTROINTESTINAL ENDOSCOPY N/A 11/16/2021    EGD performed by Tk Gregorio MD at [facility]    UPPER GASTROINTESTINAL ENDOSCOPY Left 11/16/2021    EGD BIOPSY performed by Tk Gregorio MD at [facility]    VENA CAVA FILTER PLACEMENT  2007        Medications Prior to Admission:     Prior to Admission medications    Medication Sig Start Date End Date Taking? Authorizing Provider   lacosamide (VIMPAT) 100 MG TABS tablet Take 1 tablet by mouth 2 times daily for 180 days. Take with 200 mg to equal 300 mg BID. Max Daily Amount: 200 mg 3/14/24 9/10/24  Puneet Sue MD   lacosamide (VIMPAT) 200 MG tablet Take 1 tablet by mouth 2 times daily for 180 days. Take with 100 mg to equal 300 mg BID Max Daily Amount: 400 mg 3/14/24 9/10/24  Puneet Sue MD   promethazine (PHENERGAN) 12.5 MG tablet Take 1-2 tablets by mouth every 8 hours as needed for Nausea 3/2/24   Kesha Hutson MD   LORazepam (ATIVAN) 1 MG tablet TAKE 1 TABLET BY MOUTH FOR BREAK THROUGH SEIZURE, NO MORE THAN 3 TABS PER WEEK 2/28/24 3/27/24  Puneet Sue MD   cariprazine hcl (VRAYLAR) 3 MG CAPS capsule Take 1 capsule by mouth daily    ProviderElliott MD   vitamin D (ERGOCALCIFEROL) 1.25 MG (52878 UT) CAPS capsule Take 1 capsule by mouth once a week 2/22/24   Kesha Hutson MD   lacosamide (VIMPAT) 100 MG TABS tablet Take 1 tablet by mouth 2 times daily.    ProviderElliott MD   metoprolol tartrate (LOPRESSOR) 50 MG tablet Take 1 tablet by mouth 2 times daily 1/29/24   Kesha Hutson MD   lisinopril (PRINIVIL;ZESTRIL) 20 MG tablet Take 1 tablet by mouth 2 times daily     Tramadol hcl, Ultram [tramadol], Warfarin, Other, Vancomycin hcl, and Trulicity [dulaglutide]    Social History:     Tobacco:    reports that he has never smoked. He has never used smokeless tobacco.  Alcohol:      reports no history of alcohol use.  Drug Use:  reports no history of drug use.    Family History:     Family History   Problem Relation Age of Onset    Heart Disease Father 60        MI    Stroke Brother     Obesity Brother     Arthritis Mother     Seizures Mother     Obesity Sister     Other Brother         pulmonary embolism    Diabetes Daughter     Seizures Brother        Review of Systems:     Review of Systems   Constitutional:  Negative for fatigue, fever and unexpected weight change.   HENT:  Negative for rhinorrhea.    Eyes:  Negative for photophobia.   Respiratory:  Negative for cough and shortness of breath.    Cardiovascular:  Negative for chest pain, palpitations and leg swelling.   Gastrointestinal:  Negative for abdominal distention, abdominal pain, constipation, diarrhea and nausea.   Endocrine: Negative for polyuria.   Genitourinary:  Negative for dysuria, frequency and hematuria.   Musculoskeletal:  Negative for arthralgias and back pain.   Skin:  Negative for color change.   Neurological:  Positive for numbness. Negative for dizziness, seizures, syncope, weakness, light-headedness and headaches.   Psychiatric/Behavioral:  Negative for behavioral problems and confusion. The patient is not nervous/anxious.        Physical Exam:   BP (!) 159/86   Pulse 67   Temp 98 °F (36.7 °C) (Axillary)   Resp 14   Ht 1.905 m (6' 3\")   Wt 136.1 kg (300 lb)   SpO2 96%   BMI 37.50 kg/m²   Temp (24hrs), Av.8 °F (36.6 °C), Min:97.3 °F (36.3 °C), Max:98.1 °F (36.7 °C)    Recent Labs     24  1152 24  1633 24  1949 24  0751   POCGLU 233* 269* 230* 206*         Intake/Output Summary (Last 24 hours) at 3/22/2024 0857  Last data filed at 3/21/2024 1736  Gross per 24 hour   Intake

## 2024-03-22 NOTE — PLAN OF CARE
Problem: Safety - Adult  Goal: Free from fall injury  Outcome: Progressing     Problem: Neurosensory - Adult  Goal: Achieves stable or improved neurological status  Outcome: Progressing

## 2024-03-23 VITALS
SYSTOLIC BLOOD PRESSURE: 137 MMHG | RESPIRATION RATE: 16 BRPM | BODY MASS INDEX: 37.3 KG/M2 | WEIGHT: 300 LBS | HEART RATE: 63 BPM | OXYGEN SATURATION: 96 % | DIASTOLIC BLOOD PRESSURE: 85 MMHG | TEMPERATURE: 97.9 F | HEIGHT: 75 IN

## 2024-03-23 LAB
ANION GAP SERPL CALCULATED.3IONS-SCNC: 9 MMOL/L (ref 9–16)
BUN SERPL-MCNC: 13 MG/DL (ref 6–20)
CALCIUM SERPL-MCNC: 8.8 MG/DL (ref 8.6–10.4)
CHLORIDE SERPL-SCNC: 103 MMOL/L (ref 98–107)
CO2 SERPL-SCNC: 26 MMOL/L (ref 20–31)
CREAT SERPL-MCNC: 0.8 MG/DL (ref 0.7–1.2)
ERYTHROCYTE [DISTWIDTH] IN BLOOD BY AUTOMATED COUNT: 12.9 % (ref 11.8–14.4)
GFR SERPL CREATININE-BSD FRML MDRD: >60 ML/MIN/1.73M2
GLUCOSE BLD-MCNC: 174 MG/DL (ref 75–110)
GLUCOSE BLD-MCNC: 205 MG/DL (ref 75–110)
GLUCOSE SERPL-MCNC: 170 MG/DL (ref 74–99)
HCT VFR BLD AUTO: 41.2 % (ref 40.7–50.3)
HGB BLD-MCNC: 14.8 G/DL (ref 13–17)
MCH RBC QN AUTO: 30.6 PG (ref 25.2–33.5)
MCHC RBC AUTO-ENTMCNC: 35.9 G/DL (ref 28.4–34.8)
MCV RBC AUTO: 85.3 FL (ref 82.6–102.9)
NRBC BLD-RTO: 0 PER 100 WBC
PLATELET # BLD AUTO: ABNORMAL K/UL (ref 138–453)
PLATELET, FLUORESCENCE: 157 K/UL (ref 138–453)
PLATELETS.RETICULATED NFR BLD AUTO: 3.2 % (ref 1.1–10.3)
POTASSIUM SERPL-SCNC: 3.7 MMOL/L (ref 3.7–5.3)
RBC # BLD AUTO: 4.83 M/UL (ref 4.21–5.77)
SODIUM SERPL-SCNC: 138 MMOL/L (ref 136–145)
WBC OTHER # BLD: 6.2 K/UL (ref 3.5–11.3)

## 2024-03-23 PROCEDURE — 36415 COLL VENOUS BLD VENIPUNCTURE: CPT

## 2024-03-23 PROCEDURE — 6370000000 HC RX 637 (ALT 250 FOR IP): Performed by: PSYCHIATRY & NEUROLOGY

## 2024-03-23 PROCEDURE — 2580000003 HC RX 258

## 2024-03-23 PROCEDURE — 85027 COMPLETE CBC AUTOMATED: CPT

## 2024-03-23 PROCEDURE — 95718 EEG PHYS/QHP 2-12 HR W/VEEG: CPT | Performed by: PSYCHIATRY & NEUROLOGY

## 2024-03-23 PROCEDURE — 95714 VEEG EA 12-26 HR UNMNTR: CPT

## 2024-03-23 PROCEDURE — 95720 EEG PHY/QHP EA INCR W/VEEG: CPT | Performed by: PSYCHIATRY & NEUROLOGY

## 2024-03-23 PROCEDURE — 99231 SBSQ HOSP IP/OBS SF/LOW 25: CPT | Performed by: PHYSICIAN ASSISTANT

## 2024-03-23 PROCEDURE — 85055 RETICULATED PLATELET ASSAY: CPT

## 2024-03-23 PROCEDURE — 82947 ASSAY GLUCOSE BLOOD QUANT: CPT

## 2024-03-23 PROCEDURE — 6370000000 HC RX 637 (ALT 250 FOR IP): Performed by: PHYSICIAN ASSISTANT

## 2024-03-23 PROCEDURE — 6360000002 HC RX W HCPCS

## 2024-03-23 PROCEDURE — 80048 BASIC METABOLIC PNL TOTAL CA: CPT

## 2024-03-23 PROCEDURE — 99239 HOSP IP/OBS DSCHRG MGMT >30: CPT | Performed by: PSYCHIATRY & NEUROLOGY

## 2024-03-23 PROCEDURE — 6370000000 HC RX 637 (ALT 250 FOR IP)

## 2024-03-23 RX ORDER — LACOSAMIDE 100 MG/1
200 TABLET ORAL 2 TIMES DAILY
Status: DISCONTINUED | OUTPATIENT
Start: 2024-03-23 | End: 2024-03-23 | Stop reason: HOSPADM

## 2024-03-23 RX ORDER — LEVETIRACETAM 500 MG/1
1500 TABLET ORAL 2 TIMES DAILY
Status: DISCONTINUED | OUTPATIENT
Start: 2024-03-23 | End: 2024-03-23 | Stop reason: HOSPADM

## 2024-03-23 RX ORDER — GABAPENTIN 600 MG/1
600 TABLET ORAL 3 TIMES DAILY
Status: DISCONTINUED | OUTPATIENT
Start: 2024-03-23 | End: 2024-03-23 | Stop reason: HOSPADM

## 2024-03-23 RX ADMIN — ROPINIROLE HYDROCHLORIDE 1.5 MG: 1 TABLET, FILM COATED ORAL at 12:37

## 2024-03-23 RX ADMIN — CARIPRAZINE 3 MG: 3 CAPSULE, GELATIN COATED ORAL at 09:00

## 2024-03-23 RX ADMIN — SODIUM CHLORIDE, PRESERVATIVE FREE 10 ML: 5 INJECTION INTRAVENOUS at 09:01

## 2024-03-23 RX ADMIN — INSULIN LISPRO 4 UNITS: 100 INJECTION, SOLUTION INTRAVENOUS; SUBCUTANEOUS at 12:37

## 2024-03-23 RX ADMIN — INSULIN GLARGINE 40 UNITS: 100 INJECTION, SOLUTION SUBCUTANEOUS at 09:01

## 2024-03-23 RX ADMIN — LISINOPRIL 20 MG: 20 TABLET ORAL at 09:00

## 2024-03-23 RX ADMIN — LACOSAMIDE 200 MG: 100 TABLET, FILM COATED ORAL at 14:28

## 2024-03-23 RX ADMIN — ROPINIROLE HYDROCHLORIDE 1.5 MG: 1 TABLET, FILM COATED ORAL at 09:00

## 2024-03-23 RX ADMIN — LEVETIRACETAM 1500 MG: 500 TABLET, FILM COATED ORAL at 12:36

## 2024-03-23 RX ADMIN — METOPROLOL TARTRATE 50 MG: 50 TABLET, FILM COATED ORAL at 09:00

## 2024-03-23 RX ADMIN — ENOXAPARIN SODIUM 30 MG: 100 INJECTION SUBCUTANEOUS at 09:00

## 2024-03-23 ASSESSMENT — PAIN SCALES - GENERAL
PAINLEVEL_OUTOF10: 4
PAINLEVEL_OUTOF10: 6
PAINLEVEL_OUTOF10: 6

## 2024-03-23 ASSESSMENT — ENCOUNTER SYMPTOMS
COUGH: 0
SHORTNESS OF BREATH: 0
SORE THROAT: 0
NAUSEA: 0
ABDOMINAL DISTENTION: 0
ABDOMINAL PAIN: 0
RHINORRHEA: 0

## 2024-03-23 ASSESSMENT — PAIN DESCRIPTION - LOCATION
LOCATION: FOOT

## 2024-03-23 ASSESSMENT — PAIN DESCRIPTION - ORIENTATION
ORIENTATION: RIGHT;LEFT

## 2024-03-23 NOTE — PROGRESS NOTES
[facility] Neurology   IN-PATIENT SERVICE   [facility]    Progress Note             Date:   3/23/2024  Patient name:  Nicolas Mackenzie  Date of admission:  3/20/2024  2:19 PM  MRN:   [id number]  Account:  [id number]  YOB: 1970  PCP:    Kesha Hutson MD  Room:   [address] [place] [address]  Code Status:    Full Code    Chief Complaint:     No chief complaint on file.    Interval hx:     The patient was seen and examined at bedside. Is vitally stable, alert and oriented x 4. No acute events overnight.  EEG is not showing any ictal or interictal events       Brief History of Present Illness:     The patient is a 53 y.o. male, H/O R frontal subarachnoid cyst s/p R craniectomy (11/2012), intractable seizures post-craniectomy, L hemithyroidectomy d/t follicular adenoma, HTN, HLD, DM with neuropathy, DVT/PE s/p IVC filter placement (2007), bipolar disorder, chronic back pain, non-alcoholic fatty liver, who was admitted on 3/20/2024 for elective long-term EEG monitoring     Was last seen by a neurologist Dr. Sue on November 2023 for management of seizures, was noted to have focal seizure with impaired awareness, reports that he has about 1 seizure a week last seizure was on Saturday lasting about 5 minutes followed by postictal phase of 3 hours, states that he is stuttering more recently, has word finding difficulty.  His medications include Keppra, Vimpat, and gabapentin which she takes for neuropathy.  Dose of Vimpat was increased from 250 milligram twice daily to 300 mg twice daily from November and he have not noticed any improvement in his seizures.     Current prophylactic medication Dosage   Keppra 2000 mg BID   Vimpat 300 BID   Gabapentin 600 TID            Previous prophylactic medications Reason for discontinuation   Depakote     Dilantin     Lamictal     Zonisamide                     Neurological workup:  EEG 4/25-26/23: Abnormal continuous vEEG recording, the slowing mentioned above

## 2024-03-23 NOTE — PROCEDURES
LONG-TERM EEG-VIDEO MONITORING   CLINICAL NEUROPHYSIOLOGY LABORATORY  DEPARTMENT OF NEUROLOGY  [facility]     Patient: Nicolas Mackenzie  Age: 53 y.o.  MRN: [id number]    Referring Physician: Puneet Sue MD  History: The patient is a 53 y.o. male who presented breakthrough seizure/encephalopathy. This long-term video-EEG monitoring study was performed to determine the nature of the patient's clinical events. The patient is on neuroactive medications.   Nicolas Mackenzie   Current Facility-Administered Medications   Medication Dose Route Frequency Provider Last Rate Last Admin    insulin glargine (LANTUS) injection vial 40 Units  40 Units SubCUTAneous BID Alana Baumann PA   40 Units at 03/23/24 0901    cariprazine hcl (VRAYLAR) capsule 3 mg  3 mg Oral Daily Félix Davila MD   3 mg at 03/23/24 0900    rOPINIRole (REQUIP) tablet 1.5 mg  1.5 mg Oral TID Félix Davila MD   1.5 mg at 03/23/24 0900    insulin lispro (HUMALOG) injection vial 0-16 Units  0-16 Units SubCUTAneous TID WC Alana Baumann PA   8 Units at 03/22/24 1911    insulin lispro (HUMALOG) injection vial 0-4 Units  0-4 Units SubCUTAneous Nightly Alana Baumann PA        [Held by provider] levETIRAcetam (KEPPRA) tablet 500 mg  500 mg Oral BID Ramón Shelton, DO   500 mg at 03/22/24 0954    [Held by provider] lacosamide (VIMPAT) tablet 50 mg  50 mg Oral BID Ramón Shelton, DO   50 mg at 03/22/24 1105    lisinopril (PRINIVIL;ZESTRIL) tablet 20 mg  20 mg Oral BID Félix Davila MD   20 mg at 03/23/24 0900    metoprolol tartrate (LOPRESSOR) tablet 50 mg  50 mg Oral BID Félix Davila MD   50 mg at 03/23/24 0900    [Held by provider] gabapentin (NEURONTIN) tablet 300 mg  300 mg Oral TID Félix Davila MD   300 mg at 03/22/24 1317    sodium chloride flush 0.9 % injection 5-40 mL  5-40 mL IntraVENous 2 times per day Félix Davila MD   10 mL at 03/23/24 0901    sodium chloride flush 0.9 % injection 5-40 mL  5-40 mL IntraVENous PRN Félix Davila MD        0.9

## 2024-03-23 NOTE — PROGRESS NOTES
[facility]  Office: [phone]  Artemio Briggs DO, Gamaliel Mazariegos DO, Jean Conn DO, Jan Alfredo DO, Clau Dickey MD, Francesca Chen MD, Irving Nguyen MD, Pebbles Rodríguez MD,  Kvng Martinez MD, Marine Arreaga MD, Jumana Resendiz MD,  Jossy Díaz DO, Jabier Cloud MD, Orestes Soto MD, Home Briggs DO, Gladys Kevin MD,  Bogdan Clark DO, Nery Whatley MD, Jackie Ruelas MD, Ange Garcia MD, David Robertson MD,  Sukhjinder Euceda MD, Ling Fields MD, Taryn Jimenez MD, Nasreen Bruner MD, Silvio Garcia MD, Garrett López MD, Froy Chavez DO, Angel Landon DO, Dmitri Avila MD,  Alfonzo Hannon MD, Shirley Waterhouse, CNP,  Daina Burroughs, CNP, Isaias Andrade, CNP,  Alice Baron, DNP, Disha Song, CNP, Crystal Stewart, CNP, Lisa Meyer CNP, Dasia Yao, CNP, Patricia Pickett, CNP, Courtney Cazares, PA-C, Alana Baumann, PA-C, Linda Nayak, CNP, Felicity Willis, CNS, Nikky Kuo, CNP, Kim Velásquez, CNP, Tracy Schwab, CNP         [facility]   IN-PATIENT SERVICE   [facility]    Progress Note    3/23/2024    7:29 AM    Name:   Nicolas Mackenzie  MRN:     [id number]     Acct:      [id number]   Room:   0139/0139-01   Day:  3  Admit Date:  3/20/2024  2:19 PM    PCP:   Kesha Hutson MD  Code Status:  Full Code    Subjective:     C/C: Seizures  Interval History Status: not changed.     Patient seen and examined at bedside. No acute events overnight. Patient reports he is feeling well and has no complaints.     Brief History:     Nicolas Mackenzie is a 52 y/o male who presented for elective long-term EEG monitoring.  Patient has a history significant for intractable seizures, hypertension, hyperlipidemia, diabetes mellitus, history of DVT, and bipolar disorder.     Patient follows closely with neurology outpatient.  Patient has had multiple focal seizures with impaired awareness over the last several months.  He had several medication

## 2024-03-23 NOTE — PROCEDURES
EEG was reviewed up to 8 PM    The interictal EEG was abnormal.  There is continuous attenuation of faster frequencies on the right hemisphere suggestive of underlying focal cerebral dysfunction in the right hemisphere.  Continuous theta frequencies is suggestive of mild encephalopathy.     Electronically signed by Lui Hirsch MD on 3/22/2024 at 8:40 PM

## 2024-03-23 NOTE — CARE COORDINATION
Transitional Plan  If patient is discharged prior to next notation, then this note serves as note for discharge by case management.     Met with patient and wife.  Deny needs.  Plan is home.

## 2024-03-24 NOTE — PROCEDURES
LONG-TERM EEG-VIDEO MONITORING   CLINICAL NEUROPHYSIOLOGY LABORATORY  DEPARTMENT OF NEUROLOGY  [facility]     Patient: Nicolas Mackenzie  Age: 53 y.o.  MRN: [id number]    Referring Physician: Puneet Sue MD  History: The patient is a 53 y.o. male who presented breakthrough seizure/encephalopathy. This long-term video-EEG monitoring study was performed to determine the nature of the patient's clinical events. The patient is on neuroactive medications.   Nicolas Mackenzie   No current facility-administered medications for this encounter.     Current Outpatient Medications   Medication Sig Dispense Refill    lacosamide (VIMPAT) 100 MG TABS tablet Take 1 tablet by mouth 2 times daily for 180 days. Take with 200 mg to equal 300 mg BID. Max Daily Amount: 200 mg 60 tablet 5    lacosamide (VIMPAT) 200 MG tablet Take 1 tablet by mouth 2 times daily for 180 days. Take with 100 mg to equal 300 mg BID Max Daily Amount: 400 mg 60 tablet 5    promethazine (PHENERGAN) 12.5 MG tablet Take 1-2 tablets by mouth every 8 hours as needed for Nausea 60 tablet 1    LORazepam (ATIVAN) 1 MG tablet TAKE 1 TABLET BY MOUTH FOR BREAK THROUGH SEIZURE, NO MORE THAN 3 TABS PER WEEK 12 tablet 0    cariprazine hcl (VRAYLAR) 3 MG CAPS capsule Take 1 capsule by mouth daily      vitamin D (ERGOCALCIFEROL) 1.25 MG (21267 UT) CAPS capsule Take 1 capsule by mouth once a week 12 capsule 1    lacosamide (VIMPAT) 100 MG TABS tablet Take 1 tablet by mouth 2 times daily.      metoprolol tartrate (LOPRESSOR) 50 MG tablet Take 1 tablet by mouth 2 times daily 180 tablet 1    lisinopril (PRINIVIL;ZESTRIL) 20 MG tablet Take 1 tablet by mouth 2 times daily      Magnesium Gluconate 250 MG TABS Take 2 tablets by mouth in the morning and at bedtime      gabapentin (NEURONTIN) 600 MG tablet Take 1 tablet by mouth 3 times daily for 90 days. 90 tablet 2    rOPINIRole (REQUIP) 1 MG tablet TAKE 1 AND 1/2 TABLETS BY MOUTH 3 TIMES A  tablet 1    levETIRAcetam

## 2024-03-25 ENCOUNTER — OFFICE VISIT (OUTPATIENT)
Dept: NEUROLOGY | Age: 54
End: 2024-03-25
Payer: MEDICARE

## 2024-03-25 VITALS
DIASTOLIC BLOOD PRESSURE: 83 MMHG | HEIGHT: 75 IN | HEART RATE: 76 BPM | WEIGHT: 300 LBS | SYSTOLIC BLOOD PRESSURE: 138 MMHG | BODY MASS INDEX: 37.3 KG/M2

## 2024-03-25 DIAGNOSIS — G40.909 SEIZURE DISORDER (HCC): Primary | ICD-10-CM

## 2024-03-25 PROCEDURE — 3075F SYST BP GE 130 - 139MM HG: CPT | Performed by: PSYCHIATRY & NEUROLOGY

## 2024-03-25 PROCEDURE — G8484 FLU IMMUNIZE NO ADMIN: HCPCS | Performed by: PSYCHIATRY & NEUROLOGY

## 2024-03-25 PROCEDURE — 99214 OFFICE O/P EST MOD 30 MIN: CPT | Performed by: PSYCHIATRY & NEUROLOGY

## 2024-03-25 PROCEDURE — 1111F DSCHRG MED/CURRENT MED MERGE: CPT | Performed by: PSYCHIATRY & NEUROLOGY

## 2024-03-25 PROCEDURE — 3017F COLORECTAL CA SCREEN DOC REV: CPT | Performed by: PSYCHIATRY & NEUROLOGY

## 2024-03-25 PROCEDURE — 1036F TOBACCO NON-USER: CPT | Performed by: PSYCHIATRY & NEUROLOGY

## 2024-03-25 PROCEDURE — G8427 DOCREV CUR MEDS BY ELIG CLIN: HCPCS | Performed by: PSYCHIATRY & NEUROLOGY

## 2024-03-25 PROCEDURE — G8417 CALC BMI ABV UP PARAM F/U: HCPCS | Performed by: PSYCHIATRY & NEUROLOGY

## 2024-03-25 PROCEDURE — 3079F DIAST BP 80-89 MM HG: CPT | Performed by: PSYCHIATRY & NEUROLOGY

## 2024-03-25 RX ORDER — LORAZEPAM 1 MG/1
1 TABLET ORAL DAILY PRN
Qty: 10 TABLET | Refills: 0 | Status: SHIPPED | OUTPATIENT
Start: 2024-03-25 | End: 2024-04-24

## 2024-03-25 RX ORDER — TOPIRAMATE 25 MG/1
TABLET ORAL
Qty: 60 TABLET | Refills: 3 | Status: SHIPPED | OUTPATIENT
Start: 2024-03-25

## 2024-03-25 NOTE — PROGRESS NOTES
[facility] [place]  [address], [address]  [place] [address]  Ph: [phone] or [phone]  FAX: [phone]    Chief Complaint: Seizures     Dear Kesha Hutson MD     I had the pleasure of seeing your patient today in neurology consultation for his symptoms. As you would recall Nicolas Mackenzie is a 53 y.o. male. Patient presented to [facility] with my 4/7/22. He was accompanied by his wife. Patient had an arachnoid cyst and has history of right-sided neurosurgery. Patient also has history of seizures, RUE tremor, and recently has a possible focal seizure, for which an EEG was ordered. Patient's seizures vary. He reports that he does not remember most seizures. Patient admits to aura. Patient explains that his head goes numb and tingles on the left side. Sometimes he experiences eye drooping. He also loses coordination and cannot walk. He states that he falls down during seizures sometimes. Patient typically lays down when he feels an aura, and becomes confused and has hallucinations. His wife reports that the altered mental status will last for up to 6 hours. The patient reports that he also has shorter seizures that last approximately 10 minutes. Patient wife reports he has only had 2 grand mal seizures. His first grand mal seizure was after his brain surgery, and the second one occurred approximately 10 years ago. The patient's wife also reports that he sometimes had seizures wherein his eyes would roll and he would vomit. Patient admitted to incontinence during a few of these episodes as well. Among seizures, patient also complains of episodic tremors. Patient had encephalopathy in July 2021. Patient's wife states he is still recovering, as he is not as functional as he used to be. Patient has stopped driving since.      Type A seizures: numbness/tingling, duration, lack of awareness  Type B: grand mal     On 5/4/23, the patient is accompanied by his wife. The

## 2024-03-27 DIAGNOSIS — G47.33 OSA (OBSTRUCTIVE SLEEP APNEA): ICD-10-CM

## 2024-03-27 DIAGNOSIS — R06.83 SNORING: Primary | ICD-10-CM

## 2024-03-28 DIAGNOSIS — R06.83 SNORING: Primary | ICD-10-CM

## 2024-04-01 RX ORDER — SODIUM CHLORIDE 0.9 % (FLUSH) 0.9 %
5-40 SYRINGE (ML) INJECTION PRN
Status: DISCONTINUED | OUTPATIENT
Start: 2024-04-01 | End: 2024-04-02 | Stop reason: HOSPADM

## 2024-04-01 RX ORDER — SODIUM CHLORIDE 9 MG/ML
25 INJECTION, SOLUTION INTRAVENOUS PRN
Status: DISCONTINUED | OUTPATIENT
Start: 2024-04-01 | End: 2024-04-02 | Stop reason: HOSPADM

## 2024-04-01 RX ORDER — SODIUM CHLORIDE 0.9 % (FLUSH) 0.9 %
5-40 SYRINGE (ML) INJECTION EVERY 12 HOURS SCHEDULED
Status: DISCONTINUED | OUTPATIENT
Start: 2024-04-01 | End: 2024-04-02 | Stop reason: HOSPADM

## 2024-04-01 RX ORDER — SODIUM CHLORIDE 9 MG/ML
INJECTION, SOLUTION INTRAVENOUS CONTINUOUS
Status: DISCONTINUED | OUTPATIENT
Start: 2024-04-01 | End: 2024-04-02 | Stop reason: HOSPADM

## 2024-04-02 ENCOUNTER — ANESTHESIA EVENT (OUTPATIENT)
Dept: ENDOSCOPY | Age: 54
End: 2024-04-02
Payer: MEDICARE

## 2024-04-02 ENCOUNTER — HOSPITAL ENCOUNTER (OUTPATIENT)
Age: 54
Setting detail: OUTPATIENT SURGERY
Discharge: HOME | End: 2024-04-02
Attending: INTERNAL MEDICINE | Admitting: INTERNAL MEDICINE
Payer: MEDICARE

## 2024-04-02 ENCOUNTER — ANESTHESIA (OUTPATIENT)
Dept: ENDOSCOPY | Age: 54
End: 2024-04-02
Payer: MEDICARE

## 2024-04-02 VITALS
SYSTOLIC BLOOD PRESSURE: 110 MMHG | RESPIRATION RATE: 16 BRPM | WEIGHT: 300 LBS | TEMPERATURE: 97.8 F | HEART RATE: 68 BPM | DIASTOLIC BLOOD PRESSURE: 60 MMHG | BODY MASS INDEX: 37.3 KG/M2 | HEIGHT: 75 IN | OXYGEN SATURATION: 95 %

## 2024-04-02 DIAGNOSIS — J39.2 LESION OF PHARYNX: Primary | ICD-10-CM

## 2024-04-02 LAB
GLUCOSE BLD STRIP.AUTO-MCNC: 109 MG/DL (ref 70–108)
GLUCOSE BLD STRIP.AUTO-MCNC: 75 MG/DL (ref 70–108)
GLUCOSE BLD STRIP.AUTO-MCNC: 86 MG/DL (ref 70–108)

## 2024-04-02 PROCEDURE — 7100000010 HC PHASE II RECOVERY - FIRST 15 MIN: Performed by: INTERNAL MEDICINE

## 2024-04-02 PROCEDURE — 7100000011 HC PHASE II RECOVERY - ADDTL 15 MIN: Performed by: INTERNAL MEDICINE

## 2024-04-02 PROCEDURE — 82948 REAGENT STRIP/BLOOD GLUCOSE: CPT

## 2024-04-02 PROCEDURE — 2580000003 HC RX 258: Performed by: INTERNAL MEDICINE

## 2024-04-02 PROCEDURE — 6360000002 HC RX W HCPCS: Performed by: REGISTERED NURSE

## 2024-04-02 PROCEDURE — 3700000000 HC ANESTHESIA ATTENDED CARE: Performed by: INTERNAL MEDICINE

## 2024-04-02 PROCEDURE — 88305 TISSUE EXAM BY PATHOLOGIST: CPT

## 2024-04-02 PROCEDURE — 2500000003 HC RX 250 WO HCPCS: Performed by: ANESTHESIOLOGY

## 2024-04-02 PROCEDURE — 3609012400 HC EGD TRANSORAL BIOPSY SINGLE/MULTIPLE: Performed by: INTERNAL MEDICINE

## 2024-04-02 PROCEDURE — 3700000001 HC ADD 15 MINUTES (ANESTHESIA): Performed by: INTERNAL MEDICINE

## 2024-04-02 RX ORDER — DEXTROSE MONOHYDRATE 25 G/50ML
12.5 INJECTION, SOLUTION INTRAVENOUS ONCE
Status: COMPLETED | OUTPATIENT
Start: 2024-04-02 | End: 2024-04-02

## 2024-04-02 RX ORDER — DEXTROSE MONOHYDRATE 25 G/50ML
12.5 INJECTION, SOLUTION INTRAVENOUS ONCE
Status: DISCONTINUED | OUTPATIENT
Start: 2024-04-02 | End: 2024-04-02

## 2024-04-02 RX ORDER — PROPOFOL 10 MG/ML
INJECTION, EMULSION INTRAVENOUS PRN
Status: DISCONTINUED | OUTPATIENT
Start: 2024-04-02 | End: 2024-04-02 | Stop reason: SDUPTHER

## 2024-04-02 RX ADMIN — DEXTROSE MONOHYDRATE 12.5 G: 25 INJECTION, SOLUTION INTRAVENOUS at 13:07

## 2024-04-02 RX ADMIN — PROPOFOL 120 MG: 10 INJECTION, EMULSION INTRAVENOUS at 13:47

## 2024-04-02 RX ADMIN — SODIUM CHLORIDE: 9 INJECTION, SOLUTION INTRAVENOUS at 13:08

## 2024-04-02 ASSESSMENT — PAIN - FUNCTIONAL ASSESSMENT
PAIN_FUNCTIONAL_ASSESSMENT: 0-10
PAIN_FUNCTIONAL_ASSESSMENT: 0-10
PAIN_FUNCTIONAL_ASSESSMENT: NONE - DENIES PAIN

## 2024-04-02 ASSESSMENT — ENCOUNTER SYMPTOMS: SHORTNESS OF BREATH: 1

## 2024-04-02 NOTE — PROGRESS NOTES
Pt stated that he felt like his blood sugar was low. Blood sugar checked and it was 75. Pt stated that he took all of his insulin today. Mike MURGUIA notified; orders received

## 2024-04-02 NOTE — H&P
[facility]  Sedation/Analgesia History & Physical    Patient: Nicolas Mackenzie : 1970  Med Rec#: [id number] Acc#: [id number]   Provider Performing Procedure: Tk Gregorio MD  Primary Care Physician: Kesha Hutson MD    PRE-PROCEDURE   Full CODE [x]Yes  DNR-CCA/DNR-CC []Yes   Brief History/Pre-Procedure Diagnosis:dysphagia          MEDICAL HISTORY  []CAD/Valve  []Liver Disease  []Lung Disease []Diabetes  []Hypertension []Renal Disease  []Additional information:       has a past medical history of Abnormal thyroid biopsy, Acid reflux, Anemia, Anesthesia, Bile reflux gastritis, Bipolar disorder (HCC), Cancer (HCC), Charcot's joint of foot, left, Chest pain, Chronic back pain, Chronic bronchitis (HCC), Colon polyp, Depression, Esophageal abnormality, Fatty liver disease, nonalcoholic, Follicular adenoma of thyroid gland, Furuncle, History of blood transfusion, History of Doppler ultrasound, History of pulmonary embolism, Hx of blood clots, Hyperlipidemia, Hypertension, Intracranial arachnoid cyst, Irritable bowel syndrome, Kidney failure, Liver disease, MDRO (multiple drug resistant organisms) resistance, MRSA (methicillin resistant staph aureus) culture positive, Nausea & vomiting, Nephrolithiasis, Neuropathy, Pancreatic insufficiency, Positive SANDY (antinuclear antibody), Prolonged emergence from general anesthesia, Restless legs syndrome, Seizures (HCC), Sinus tachycardia, Skull fracture (HCC), Sleep apnea, and Type II or unspecified type diabetes mellitus without mention of complication, not stated as uncontrolled.    SURGICAL HISTORY   has a past surgical history that includes knee surgery (Left, ); Vena Cava Filter Placement (); hernia repair (Right, ); Cholecystectomy (); Upper gastrointestinal endoscopy (); transthoracic echocardiogram (2011); other surgical history (2011); Cardiac catheterization; Cardiac catheterization (2012); craniotomy

## 2024-04-02 NOTE — ANESTHESIA PRE PROCEDURE
gabapentin (NEURONTIN) 600 MG tablet Take 1 tablet by mouth 3 times daily for 90 days. 12/30/23 3/29/24  Kesha Hutson MD   rOPINIRole (REQUIP) 1 MG tablet TAKE 1 AND 1/2 TABLETS BY MOUTH 3 TIMES A DAY 12/28/23   Puneet Sue MD   levETIRAcetam (KEPPRA) 1000 MG tablet TAKE 2 TABLETS BY MOUTH 2 TIMES A DAY 12/28/23   Puneet Sue MD   blood glucose test strips (ONETOUCH ULTRA) strip Test blood sugars 4 times daily DXE11.40 Patient is on insulin . 12/20/23   Kesha Hutson MD   ONE TOUCH ULTRASOFT LANCETS MISC Use to test blood sugars 4 times a day . DX:E11.40 12/20/23   Kesha Hutson MD   tadalafil (CIALIS) 10 MG tablet Take 1 tablet by mouth every other day 11/30/23   Elliott Stacy MD   zolpidem (AMBIEN) 5 MG tablet Take 1 tablet by mouth daily as needed.  Patient not taking: Reported on 2/22/2024 9/22/23   Elliott Stacy MD   Insulin Pen Needle 32G X 4 MM MISC Use 5 times daily with insulin pens 10/2/23   Kesha Hutson MD   insulin glargine (BASAGLAR KWIKPEN) 100 UNIT/ML injection pen Inject 45 Units into the skin 2 times daily  Patient taking differently: Inject 80 Units into the skin 2 times daily 8/21/23   Biju Meneses DO   pantoprazole (PROTONIX) 40 MG tablet Take 1 tablet by mouth 2 times daily 8/21/23   Biju Meneses DO   rosuvastatin (CRESTOR) 5 MG tablet TAKE 1 TABLET BY MOUTH EVERY DAY  Patient not taking: Reported on 2/22/2024 8/4/23   Kesha Hutson MD   DULoxetine (CYMBALTA) 60 MG extended release capsule Take 1 capsule by mouth daily    Elliott Stacy MD   insulin lispro (HUMALOG) 100 UNIT/ML injection vial Takes 30-40 units with meals plus \"group 4 sliding scale/ increments of 4\"    Elliott Stacy MD       Current medications:    Current Facility-Administered Medications   Medication Dose Route Frequency Provider Last Rate Last Admin    sodium chloride flush 0.9 % injection 5-40 mL  5-40 mL IntraVENous 2 times per day Sheikh

## 2024-04-02 NOTE — ANESTHESIA POSTPROCEDURE EVALUATION
Department of Anesthesiology  Postprocedure Note    Patient: Nicolas Mackenzie  MRN: [id number]  YOB: 1970  Date of evaluation: 4/2/2024    Procedure Summary       Date: 04/02/24 Room / Location: [place] [address] / [facility]    Anesthesia Start: 1338 Anesthesia Stop: 1355    Procedure: ESOPHAGOGASTRODUODENOSCOPY BIOPSY Diagnosis:       Dysphagia, unspecified type      (Dysphagia, unspecified type [R13.10])    Surgeons: Tk Gregorio MD Responsible Provider: Brendan Reyes MD    Anesthesia Type: MAC ASA Status: 3            Anesthesia Type: No value filed.    Levi Phase I: Levi Score: 10    Levi Phase II:      Anesthesia Post Evaluation    Patient location during evaluation: bedside  Patient participation: complete - patient participated  Level of consciousness: awake and alert  Airway patency: patent  Nausea & Vomiting: no nausea and no vomiting  Cardiovascular status: hemodynamically stable and blood pressure returned to baseline  Respiratory status: acceptable, spontaneous ventilation, nonlabored ventilation and room air  Hydration status: stable  Pain management: adequate        No notable events documented.

## 2024-04-02 NOTE — PROGRESS NOTES
Patient is in phase 2 passing gas, taking fluids.  discussed findings, plan of care, discharge instructions with pt and . graciela

## 2024-04-02 NOTE — POST SEDATION
[facility]  Sedation/Analgesia Post Sedation Record    Patient: Nicolas Mackenzie : 1970  Med Rec#: [id number] Acc#: [id number]   Procedure Performed By: Tk Gregorio MD  Primary Care Physician: Kesha Hutson MD    POST-PROCEDURE    Physicians/Assistants: Tk Gregorio MD  Procedure Performed:    Sedation/Anesthesia:     Estimated Blood Loss:          ml  Specimens Removed:  []None [x]Other:      Disposition of Specimen:  [x]Pathology []Other      Complications:   [x]None Immediate []Other:     Post-procedure Diagnosis/Findings:             Recommendations:   Hypopharygeal lesion            Tk Gregorio MD, MD FACG  Electronically signed 2024 at 1:56 PM

## 2024-04-02 NOTE — PROGRESS NOTES
EGD complete, photos taken, 2 specimens taken, pt tolerated procedure well    Scope Number [id number]  used.

## 2024-04-03 NOTE — PROCEDURES
[address] [address]                             PROCEDURE NOTE      PATIENT NAME: AMALIA RAMIREZ             : 1970  MED REC NO: [id number]                       ROOM: [facility]  ACCOUNT NO: [id number]                       ADMIT DATE: 2024  PROVIDER: Tk Gregorio MD      DATE OF PROCEDURE:      SURGEON:  Tk Gregorio MD    PROCEDURE:  Esophagogastroduodenoscopy plus biopsy.    INDICATIONS FOR THE PROCEDURE:  The patient with a history of dysphagia.  The patient points towards suprasternal notch that food get stuck there.  The patient is having some GERD, known Gregory.  The patient also had HP treated in the past.  Some mild gastritis.  See the recent office note and preop note for rest of clinicals.    ASA CLASSIFICATION:  III.    MEDICATION:  Per Anesthesia.    BIOPSY:  Yes.    PHOTOGRAPHS:  Yes.    DESCRIPTION OF THE PROCEDURE:  Informed consent was obtained after explaining risks and benefits of the procedure and conscious sedation.  Possible complications including bleeding, perforation, reaction to medicine, but not limiting to death, were discussed.    The GIF-180 gastroscope was advanced through oropharynx.  The patient is having a small growth, it has a benign look, but definitely has a growth in the hypopharynx and it was negotiated, and the scope was advanced into the esophagus, stomach, into the duodenum.  Normal-looking duodenal bulb and second portion.  Mucosa was healthy, see photograph in periampullary area, and scope was withdrawn.  The patient having gastritis which was more marked in the antrum, but it was extending in the body.  Fundus felt relatively spared.  In view of previous HP, biopsies were taken to check for reactivation, and the scope was withdrawn.  Lower esophageal incompetence.  Patient is having a patch of Gregory.  Using NBI light, close examination was done and the biopsies

## 2024-04-09 ENCOUNTER — NURSE ONLY (OUTPATIENT)
Dept: LAB | Age: 54
End: 2024-04-09

## 2024-04-09 ENCOUNTER — TELEPHONE (OUTPATIENT)
Dept: INTERNAL MEDICINE CLINIC | Age: 54
End: 2024-04-09

## 2024-04-09 ENCOUNTER — OFFICE VISIT (OUTPATIENT)
Dept: INTERNAL MEDICINE CLINIC | Age: 54
End: 2024-04-09
Payer: MEDICARE

## 2024-04-09 VITALS
SYSTOLIC BLOOD PRESSURE: 126 MMHG | WEIGHT: 309.2 LBS | DIASTOLIC BLOOD PRESSURE: 72 MMHG | BODY MASS INDEX: 38.65 KG/M2 | HEART RATE: 82 BPM | TEMPERATURE: 97.8 F

## 2024-04-09 DIAGNOSIS — E53.8 VITAMIN B12 DEFICIENCY: ICD-10-CM

## 2024-04-09 DIAGNOSIS — E11.9 TYPE 2 DIABETES MELLITUS WITHOUT COMPLICATION, WITH LONG-TERM CURRENT USE OF INSULIN (HCC): Primary | ICD-10-CM

## 2024-04-09 DIAGNOSIS — E11.40 TYPE 2 DIABETES MELLITUS WITH DIABETIC NEUROPATHY, WITH LONG-TERM CURRENT USE OF INSULIN (HCC): ICD-10-CM

## 2024-04-09 DIAGNOSIS — Z79.4 TYPE 2 DIABETES MELLITUS WITHOUT COMPLICATION, WITH LONG-TERM CURRENT USE OF INSULIN (HCC): Primary | ICD-10-CM

## 2024-04-09 DIAGNOSIS — Z79.4 TYPE 2 DIABETES MELLITUS WITH DIABETIC NEUROPATHY, WITH LONG-TERM CURRENT USE OF INSULIN (HCC): ICD-10-CM

## 2024-04-09 DIAGNOSIS — E55.9 VITAMIN D DEFICIENCY: ICD-10-CM

## 2024-04-09 DIAGNOSIS — I10 PRIMARY HYPERTENSION: ICD-10-CM

## 2024-04-09 LAB
25(OH)D3 SERPL-MCNC: 29 NG/ML (ref 30–100)
ANION GAP SERPL CALC-SCNC: 10 MEQ/L (ref 8–16)
BUN SERPL-MCNC: 16 MG/DL (ref 7–22)
CALCIUM SERPL-MCNC: 8.8 MG/DL (ref 8.5–10.5)
CHLORIDE SERPL-SCNC: 101 MEQ/L (ref 98–111)
CO2 SERPL-SCNC: 28 MEQ/L (ref 23–33)
CREAT SERPL-MCNC: 1 MG/DL (ref 0.4–1.2)
FOLATE SERPL-MCNC: 15.3 NG/ML (ref 4.8–24.2)
GFR SERPL CREATININE-BSD FRML MDRD: 90 ML/MIN/1.73M2
GLUCOSE SERPL-MCNC: 434 MG/DL (ref 70–108)
POTASSIUM SERPL-SCNC: 4.9 MEQ/L (ref 3.5–5.2)
SODIUM SERPL-SCNC: 139 MEQ/L (ref 135–145)
VIT B12 SERPL-MCNC: 327 PG/ML (ref 211–911)

## 2024-04-09 PROCEDURE — G0108 DIAB MANAGE TRN  PER INDIV: HCPCS | Performed by: PHARMACIST

## 2024-04-09 NOTE — PROGRESS NOTES
to: Class Availability    For Pharmacy Admin Tracking Only    Program: Medical Group  CPA in place:  Yes  Recommendation Provided To: Provider: 1 via Note to Provider and Patient/Caregiver: 3 via In person  Intervention Detail: Benefit Assistance, New Rx: 1, reason: Needs Additional Therapy, and Patient Access Assistance/Sample Provided  Intervention Accepted By: Patient/Caregiver: 1  Gap Closed?: Yes   Time Spent (min): 60    Nayeli GarD, BCPS  4/9/2024  12:59 PM

## 2024-04-09 NOTE — TELEPHONE ENCOUNTER
Patient wanted you to know that his neurologist in [place] is doing work up for Parkinson's disease .

## 2024-04-09 NOTE — PATIENT INSTRUCTIONS
Represented from [employer] will be calling you (hopefully this week) to discuss coverage.  If covered, will plan on NPG: Jorge insert the [employer].  Continue to check blood sugars 3-4 times daily. Will send new prescription to outpatient medical supply company for new meter.

## 2024-04-10 RX ORDER — INSULIN LISPRO 100 [IU]/ML
INJECTION, SOLUTION INTRAVENOUS; SUBCUTANEOUS
Qty: 30 ADJUSTABLE DOSE PRE-FILLED PEN SYRINGE | Refills: 3 | Status: SHIPPED | OUTPATIENT
Start: 2024-04-10

## 2024-04-11 RX ORDER — GABAPENTIN 600 MG/1
600 TABLET ORAL 3 TIMES DAILY
Qty: 90 TABLET | Refills: 2 | OUTPATIENT
Start: 2024-04-11 | End: 2024-07-10

## 2024-04-12 NOTE — TELEPHONE ENCOUNTER
Last script filled on the script from 8/21/23 - I had written another script 12/2023 for 3 months that hasn't been started yet.  He also has script from 2 other providers in [facility] from Feb and March.  So I refused the refill.

## 2024-04-26 ENCOUNTER — TELEPHONE (OUTPATIENT)
Dept: NEUROLOGY | Age: 54
End: 2024-04-26

## 2024-04-26 NOTE — TELEPHONE ENCOUNTER
Nicolas called the office back and stated that he already has the CADE scan scheduled at [facility].

## 2024-04-26 NOTE — TELEPHONE ENCOUNTER
There is an order in the chart for a CADE scan.  [facility] called and they do not do the scan.  I LMOM for the patient to call me back and let me know if he would rather go to [facility] or [facility] for this scan. [place]

## 2024-04-29 ENCOUNTER — OFFICE VISIT (OUTPATIENT)
Dept: INTERNAL MEDICINE CLINIC | Age: 54
End: 2024-04-29

## 2024-04-29 VITALS
SYSTOLIC BLOOD PRESSURE: 126 MMHG | DIASTOLIC BLOOD PRESSURE: 66 MMHG | TEMPERATURE: 97.7 F | OXYGEN SATURATION: 95 % | HEART RATE: 68 BPM | HEIGHT: 75 IN | BODY MASS INDEX: 37.3 KG/M2 | WEIGHT: 300 LBS

## 2024-04-29 DIAGNOSIS — Z79.4 TYPE 2 DIABETES MELLITUS WITH DIABETIC NEUROPATHY, WITH LONG-TERM CURRENT USE OF INSULIN (HCC): ICD-10-CM

## 2024-04-29 DIAGNOSIS — R05.1 ACUTE COUGH: Primary | ICD-10-CM

## 2024-04-29 DIAGNOSIS — E55.9 VITAMIN D DEFICIENCY: ICD-10-CM

## 2024-04-29 DIAGNOSIS — E11.40 TYPE 2 DIABETES MELLITUS WITH DIABETIC NEUROPATHY, WITH LONG-TERM CURRENT USE OF INSULIN (HCC): ICD-10-CM

## 2024-04-29 RX ORDER — BENZONATATE 100 MG/1
100 CAPSULE ORAL 3 TIMES DAILY PRN
Qty: 21 CAPSULE | Refills: 0 | Status: SHIPPED | OUTPATIENT
Start: 2024-04-29 | End: 2024-05-06

## 2024-04-29 RX ORDER — AZITHROMYCIN 250 MG/1
TABLET, FILM COATED ORAL
Qty: 6 TABLET | Refills: 0 | Status: SHIPPED | OUTPATIENT
Start: 2024-04-29 | End: 2024-05-09

## 2024-04-29 RX ORDER — ALBUTEROL SULFATE 90 UG/1
2 AEROSOL, METERED RESPIRATORY (INHALATION) 4 TIMES DAILY PRN
Qty: 18 G | Refills: 0 | Status: SHIPPED | OUTPATIENT
Start: 2024-04-29

## 2024-04-29 RX ORDER — GABAPENTIN 600 MG/1
600 TABLET ORAL 3 TIMES DAILY
COMMUNITY
Start: 2024-04-16

## 2024-04-29 NOTE — PATIENT INSTRUCTIONS
Start Albuterol inhaler as needed. Start tessalon pearls 100 mg every 8 hours for 7 days. Start Z-Pack: 2 pills first day and then 1 pill for the next 4 days.  Change supplemental Vitamin D to 5000 units daily.

## 2024-04-29 NOTE — PROGRESS NOTES
1970    Chief Complaint   Patient presents with    Cough     Productive-not sure if discolored-coughing all night-has pain in chest when coughs x 10 days    nose stuffed up       This patient presents for medical evaluation. This patient is followed regularly by Dr. Hutson.    Nicolas Mackenzie is a 52 yo M who is presenting today for cough. Patient reports onset of cough roughly 7-10 days prior to visit. He noted that his granddaughter had a cough a few days prior to his own, but it only lasted a day. Cough is nonproductive and occurs throughout the day. It will cause him to go in a spell that last multiple hours that make it very difficult to breath. Patient notes he is also clammy/sweaty during these events. Patient reports that he tried to use an inhaler from when he had pneumonia 10 years ago. Advised patient not to use this inhaler anymore. Patient has also tried mucinex, but has not brought anything up. Patient reports that he is going to be evaluated by neurology for Parkinson's soon and wasn't sure if Dr. Hutson was aware of this. He continues to monitor his sugars. He stats that highest in the morning is 240 and lowest at night is 130. On average they run in the high 100's. Patient underwent EGD in April of 2024 which showed Hypopharyngeal lesions. Patient was referred to ENT for further evaluation. Appointment is scheduled for 5/7/2024. Biopsy during the EGD showed no metaplasia and no Helicobacter microorganisms. After evaluation, possible repeat EGD but with dilation.     Patient Active Problem List   Diagnosis    Depression    Hyperlipidemia    Liver disease    Restless legs syndrome    Chronic back pain    Other chest pain    Irritable bowel syndrome    Non compliance w medication regimen    Hyperlipidemia with target LDL less than 100    Sinus tachycardia    Neuropathy    Hyperammonemia (HCC)    Medication overdose, insulin    Suicide attempt (HCC)    Lithium toxicity    Nausea & vomiting

## 2024-05-07 ENCOUNTER — OFFICE VISIT (OUTPATIENT)
Dept: ENT CLINIC | Age: 54
End: 2024-05-07
Payer: MEDICARE

## 2024-05-07 ENCOUNTER — TELEPHONE (OUTPATIENT)
Dept: ENT CLINIC | Age: 54
End: 2024-05-07

## 2024-05-07 VITALS
HEART RATE: 82 BPM | SYSTOLIC BLOOD PRESSURE: 172 MMHG | HEIGHT: 75 IN | TEMPERATURE: 97.2 F | OXYGEN SATURATION: 94 % | WEIGHT: 303 LBS | DIASTOLIC BLOOD PRESSURE: 90 MMHG | BODY MASS INDEX: 37.67 KG/M2

## 2024-05-07 DIAGNOSIS — R13.14 PHARYNGOESOPHAGEAL DYSPHAGIA: Primary | ICD-10-CM

## 2024-05-07 DIAGNOSIS — K22.89 MASS OF ESOPHAGUS DETERMINED BY ENDOSCOPY: ICD-10-CM

## 2024-05-07 PROCEDURE — 3017F COLORECTAL CA SCREEN DOC REV: CPT | Performed by: OTOLARYNGOLOGY

## 2024-05-07 PROCEDURE — G8417 CALC BMI ABV UP PARAM F/U: HCPCS | Performed by: OTOLARYNGOLOGY

## 2024-05-07 PROCEDURE — 31575 DIAGNOSTIC LARYNGOSCOPY: CPT | Performed by: OTOLARYNGOLOGY

## 2024-05-07 PROCEDURE — 1036F TOBACCO NON-USER: CPT | Performed by: OTOLARYNGOLOGY

## 2024-05-07 PROCEDURE — 3080F DIAST BP >= 90 MM HG: CPT | Performed by: OTOLARYNGOLOGY

## 2024-05-07 PROCEDURE — G8427 DOCREV CUR MEDS BY ELIG CLIN: HCPCS | Performed by: OTOLARYNGOLOGY

## 2024-05-07 PROCEDURE — 99204 OFFICE O/P NEW MOD 45 MIN: CPT | Performed by: OTOLARYNGOLOGY

## 2024-05-07 PROCEDURE — 3077F SYST BP >= 140 MM HG: CPT | Performed by: OTOLARYNGOLOGY

## 2024-05-07 NOTE — TELEPHONE ENCOUNTER
Patient was seen in our office by Dr Portillo today. Dr Portillo ordered a STAT CT soft tissue neck w contrast. Patient does have history of renal disease so CT scan cannot be scheduled without approval from patient's Kidney doctor. I called Dr Hackett's office today with [facility] to get approval for this scan and spoke with a member of the staff who is going to talk to Dr Hackett and call our office back.

## 2024-05-07 NOTE — TELEPHONE ENCOUNTER
A member of Dr Hackett's office called our office back and informed us this patient is approved to have the CT scan but he will need to move up his appointment with their office to 10 days after his CT scan. I called patient and spoke with his wife informing her that Dr Hackett wants to see the patient about 10 days after his scan and she verbalized understanding. I called central scheduling and the earliest they could get him in was 5/8/2024 but the patient will be in [place] the entire day on 5/8/2024 due to other medical testing. The patient was not willing to go to [facility] to possibly have the scan done today so we scheduled him for 5/9/2024. I asked patient's wife if he could come in to our office on 5/10/2024 to see Dr Portillo and she said no she had to work that day and he would not be able to come into our office until 5/16/2024 or 5/17/2024. I scheduled the patient on 5/16/2024 with Dr Portillo.

## 2024-05-07 NOTE — PROGRESS NOTES
[facility]  [facility]  [address]   [address]  [facility] [address]  Dept: [phone]  Dept Fax: [phone]  Loc: [phone]    Nicolas Mackenzie is a 53 y.o. male who was referred by Tk Gregorio MD for:  Chief Complaint   Patient presents with    New Patient     New patient here for lesion of pharynx. Patient states that he is doing ok.   .    HPI:     Nicolas Mackenzie is a 53 y.o. male who presents today for evaluation of a mass noted by Dr. Szymanski during an EGD.  This is apparently in the hypopharynx. He took a photo: The mass can be seen just posterior to the arytenoid cartilages in the postcricoid area.        History:     Allergies   Allergen Reactions    Ciprofloxacin-Ciproflox Hcl Er Other (See Comments)     Elevated creatinine    Metformin Nausea Only     Abdominal pain, diarrhea    Niacin And Related Other (See Comments)     Burning sensation, severe flushing    Tramadol Hcl      Contraindication to seizure medications    Ultram [Tramadol]      Contraindication to seizure medications    Warfarin      Very difficult to regulate in past    Other      Other reaction(s): very difficult to regulate    Vancomycin Hcl Other (See Comments)     Kidney failure    Trulicity [Dulaglutide] Nausea Only     Did not tolerate 3mg      Current Outpatient Medications   Medication Sig Dispense Refill    gabapentin (NEURONTIN) 600 MG tablet Take 1 tablet by mouth 3 times daily.      albuterol sulfate HFA (VENTOLIN HFA) 108 (90 Base) MCG/ACT inhaler Inhale 2 puffs into the lungs 4 times daily as needed for Wheezing or Shortness of Breath 18 g 0    insulin lispro, 1 Unit Dial, (HUMALOG KWIKPEN) 100 UNIT/ML SOPN Use 30-40 units with meals plus group 4 sliding scale / increments of 4 units 30 Adjustable Dose Pre-filled Pen Syringe 3    lacosamide (VIMPAT) 100 MG TABS tablet Take 1 tablet by mouth 2 times daily for 180 days. Take with 200 mg to equal 300 mg

## 2024-05-08 ENCOUNTER — HOSPITAL ENCOUNTER (OUTPATIENT)
Dept: NUCLEAR MEDICINE | Age: 54
Discharge: HOME | End: 2024-05-10
Payer: MEDICARE

## 2024-05-08 ENCOUNTER — HOSPITAL ENCOUNTER (OUTPATIENT)
Dept: NUCLEAR MEDICINE | Age: 54
Discharge: HOME | End: 2024-05-10
Attending: PSYCHIATRY & NEUROLOGY
Payer: MEDICARE

## 2024-05-08 ENCOUNTER — TELEPHONE (OUTPATIENT)
Dept: NEUROLOGY | Age: 54
End: 2024-05-08

## 2024-05-08 ENCOUNTER — OFFICE VISIT (OUTPATIENT)
Dept: NEUROLOGY | Age: 54
End: 2024-05-08
Payer: MEDICARE

## 2024-05-08 VITALS
BODY MASS INDEX: 38.22 KG/M2 | HEIGHT: 75 IN | WEIGHT: 307.4 LBS | SYSTOLIC BLOOD PRESSURE: 147 MMHG | DIASTOLIC BLOOD PRESSURE: 89 MMHG | HEART RATE: 79 BPM

## 2024-05-08 DIAGNOSIS — G40.909 SEIZURE DISORDER (HCC): ICD-10-CM

## 2024-05-08 DIAGNOSIS — G40.909 SEIZURE DISORDER (HCC): Primary | ICD-10-CM

## 2024-05-08 PROCEDURE — G8427 DOCREV CUR MEDS BY ELIG CLIN: HCPCS | Performed by: PSYCHIATRY & NEUROLOGY

## 2024-05-08 PROCEDURE — A9584 IODINE I-123 IOFLUPANE: HCPCS | Performed by: PSYCHIATRY & NEUROLOGY

## 2024-05-08 PROCEDURE — 3017F COLORECTAL CA SCREEN DOC REV: CPT | Performed by: PSYCHIATRY & NEUROLOGY

## 2024-05-08 PROCEDURE — G8417 CALC BMI ABV UP PARAM F/U: HCPCS | Performed by: PSYCHIATRY & NEUROLOGY

## 2024-05-08 PROCEDURE — 78803 RP LOCLZJ TUM SPECT 1 AREA: CPT

## 2024-05-08 PROCEDURE — 3079F DIAST BP 80-89 MM HG: CPT | Performed by: PSYCHIATRY & NEUROLOGY

## 2024-05-08 PROCEDURE — 99214 OFFICE O/P EST MOD 30 MIN: CPT | Performed by: PSYCHIATRY & NEUROLOGY

## 2024-05-08 PROCEDURE — 1036F TOBACCO NON-USER: CPT | Performed by: PSYCHIATRY & NEUROLOGY

## 2024-05-08 PROCEDURE — 3430000000 HC RX DIAGNOSTIC RADIOPHARMACEUTICAL: Performed by: PSYCHIATRY & NEUROLOGY

## 2024-05-08 PROCEDURE — 6370000000 HC RX 637 (ALT 250 FOR IP): Performed by: PSYCHIATRY & NEUROLOGY

## 2024-05-08 PROCEDURE — 3077F SYST BP >= 140 MM HG: CPT | Performed by: PSYCHIATRY & NEUROLOGY

## 2024-05-08 RX ORDER — POTASSIUM IODIDE 65 MG/ML
2 SOLUTION ORAL ONCE
Status: COMPLETED | OUTPATIENT
Start: 2024-05-08 | End: 2024-05-08

## 2024-05-08 RX ADMIN — POTASSIUM IODIDE 130 MG: 65 SOLUTION ORAL at 09:05

## 2024-05-08 RX ADMIN — IOFLUPANE I-123 5.1 MILLICURIE: 2 INJECTION, SOLUTION INTRAVENOUS at 09:35

## 2024-05-08 NOTE — PROGRESS NOTES
frontal encephalomalacia from arachnoid cyst surgery.    The patient reports experiencing two breakthrough seizures since the previous visit on March 25, 2024. Since has completely discontinued Keppra 2000 mg BID. He currently takes Vimpat 300 mg, Gabapentin 600 TID, and Topamax 25 mg BID for seizure prophylaxis, denies missing a dose. I advised he increase Topamax dose to 50 mg BID for improvement of seizures, headaches, and mood.    Current prophylactic medication Dosage   Vimpat 300 BID   Gabapentin 600 TID   Topamax 25 mg BID  à 50 mg BID      November 2011 right frontal arachnoid cyst surgery and craniotomy    Onset of seizures late 2011 June 2014 long-term EEG monitoring [facility] showing left temporal focal slowing and left temporal occipital sharp waves.  None of patient's typical events were captured.    April 2021 long-term EEG monitoring at [facility] patient at that time was on Depakote, zonisamide, Vimpat.  No events were captured    December 2021 long-term EEG monitoring at [facility] showing left posterior temporal sharp waves but no events were captured    April 2023 long-term EEG monitoring at [facility], showing left temporal sharp waves but no events were captured    March 2024 long-term EEG monitoring at [facility], showing continuous right hemispheric attenuation but no events were captured    Neurological Workup:    MRI brain with and without contrast 03/15/2024: Stable MRI scan of the brain, no interval change since previous study dated 5/20/2021. Postoperative changes in the right frontal calvarium and encephalomalacia in the underlying right frontal lobe. There is some encephalomalacia in the right cerebellar hemisphere and in the inferior vermis. There is mild atrophy. This a retention cyst or polyp in the left maxillary sinus. There are inflammatory changes in the ethmoid air cells

## 2024-05-08 NOTE — TELEPHONE ENCOUNTER
Patient called into the office regarding his DaTscan that he'd tried to have done today. He said that they were only able to get a couple of minutes of it; however, he was unable to finish it because he was too claustrophobic. Please advise.

## 2024-05-09 ENCOUNTER — HOSPITAL ENCOUNTER (OUTPATIENT)
Dept: CT IMAGING | Age: 54
Discharge: HOME | End: 2024-05-09
Attending: OTOLARYNGOLOGY
Payer: MEDICARE

## 2024-05-09 ENCOUNTER — TELEPHONE (OUTPATIENT)
Dept: INTERNAL MEDICINE CLINIC | Age: 54
End: 2024-05-09

## 2024-05-09 DIAGNOSIS — K22.89 MASS OF ESOPHAGUS DETERMINED BY ENDOSCOPY: ICD-10-CM

## 2024-05-09 DIAGNOSIS — R13.14 PHARYNGOESOPHAGEAL DYSPHAGIA: ICD-10-CM

## 2024-05-09 PROCEDURE — 6360000004 HC RX CONTRAST MEDICATION: Performed by: OTOLARYNGOLOGY

## 2024-05-09 PROCEDURE — 70491 CT SOFT TISSUE NECK W/DYE: CPT

## 2024-05-09 RX ORDER — BLOOD-GLUCOSE METER
KIT MISCELLANEOUS
Qty: 1 KIT | Refills: 0 | Status: SHIPPED | OUTPATIENT
Start: 2024-05-09 | End: 2024-05-15

## 2024-05-09 RX ORDER — BLOOD SUGAR DIAGNOSTIC
STRIP MISCELLANEOUS
Qty: 400 EACH | Refills: 2 | Status: SHIPPED | OUTPATIENT
Start: 2024-05-09 | End: 2024-05-15

## 2024-05-09 RX ORDER — LANCETS 30 GAUGE
1 EACH MISCELLANEOUS 4 TIMES DAILY
Qty: 400 EACH | Refills: 2 | Status: SHIPPED | OUTPATIENT
Start: 2024-05-09 | End: 2024-05-15

## 2024-05-09 RX ORDER — GLUCOSAMINE HCL/CHONDROITIN SU 500-400 MG
CAPSULE ORAL
Qty: 400 STRIP | Refills: 2 | Status: SHIPPED | OUTPATIENT
Start: 2024-05-09 | End: 2024-05-15

## 2024-05-09 RX ADMIN — IOPAMIDOL 85 ML: 755 INJECTION, SOLUTION INTRAVENOUS at 08:36

## 2024-05-09 NOTE — TELEPHONE ENCOUNTER
Hector was unable to obtain his glucometer and strips through the local pharmacy. We attempted to obtain supplies through [employer], but these are not being processed.     Will send to [facility] pharmacy instead (as they can bill part B).     Also called [facility] to request Humalog processing - provided clarification on medication dosing. Also clarified Trulicity Rx. They are now cleared to ship the Humalog & Trulicity.     We continue to work on obtaining the [employer] CGM for Hector, but he has requested that this be placed on hold due to his numerous upcoming surgeries.     For Pharmacy Admin Tracking Only    Program: Medical Group  CPA in place:  Yes  Recommendation Provided To: Patient/Caregiver: 2 via Telephone  Intervention Detail: New Rx: 1, reason: Patient Preference and Patient Access Assistance/Sample Provided  Intervention Accepted By: Patient/Caregiver: 2  Gap Closed?: No   Time Spent (min): 30      Leticia Ramos, PharmD, BCPS, [facility]  Internal Medicine Clinical Pharmacist  [phone]

## 2024-05-10 NOTE — TELEPHONE ENCOUNTER
Pharmacy requesting refill of Topamax 25 mg.      Medication active on med list yes      Date of last Rx: 3/25/2024 with 3 refills          verified by SB, RMA      Date of last appointment 5/8/2024    Next Visit Date:  9/12/2024      Dose changed to 50 mg BID at 5/8/24 follow up.

## 2024-05-13 ENCOUNTER — TELEPHONE (OUTPATIENT)
Dept: NEUROLOGY | Age: 54
End: 2024-05-13

## 2024-05-13 RX ORDER — TOPIRAMATE 50 MG/1
50 TABLET, FILM COATED ORAL 2 TIMES DAILY
Qty: 60 TABLET | Refills: 5 | Status: SHIPPED | OUTPATIENT
Start: 2024-05-13

## 2024-05-13 NOTE — TELEPHONE ENCOUNTER
CADE scan is abnormal. Rec to give a trial of sinemet 25/100 mg TID and please have him call our office in 2-3 weeks. Thanks

## 2024-05-13 NOTE — TELEPHONE ENCOUNTER
Hector called in. He said he had two separate events this past weekend. he said he was lying down he was lying down got up to get something to drink and got very lightheaded and dizzy. He vomitted and fell. He did not pass out.  He did increase his Topamax after his visit last week.  His phone number is [phone].

## 2024-05-14 NOTE — TELEPHONE ENCOUNTER
I spoke with Snoya and gave her the message.  She asked me to fax the referral to [facility] as they stated they had not received it. I called [facility] and I faxed it along with their ref. form and Dr. Sue's  notes to [phone]

## 2024-05-14 NOTE — TELEPHONE ENCOUNTER
Noted. I will give topiramate some more time. Please have him call us in 1-2 weeks or if the symptoms recur. Thanks

## 2024-05-15 RX ORDER — BLOOD-GLUCOSE METER
KIT MISCELLANEOUS
Qty: 1 KIT | Refills: 0 | Status: SHIPPED | OUTPATIENT
Start: 2024-05-15

## 2024-05-15 RX ORDER — LANCETS 30 GAUGE
1 EACH MISCELLANEOUS 4 TIMES DAILY
Qty: 400 EACH | Refills: 2 | Status: SHIPPED | OUTPATIENT
Start: 2024-05-15 | End: 2024-05-22 | Stop reason: SDUPTHER

## 2024-05-15 RX ORDER — GLUCOSAMINE HCL/CHONDROITIN SU 500-400 MG
CAPSULE ORAL
Qty: 400 STRIP | Refills: 2 | Status: SHIPPED | OUTPATIENT
Start: 2024-05-15

## 2024-05-15 RX ORDER — BLOOD SUGAR DIAGNOSTIC
STRIP MISCELLANEOUS
Qty: 400 EACH | Refills: 2 | Status: SHIPPED | OUTPATIENT
Start: 2024-05-15

## 2024-05-16 ENCOUNTER — TELEPHONE (OUTPATIENT)
Dept: ENT CLINIC | Age: 54
End: 2024-05-16

## 2024-05-16 NOTE — TELEPHONE ENCOUNTER
I spoke w Dr Portillo and he said that since patient already aware of mass to just go ahead and schedule for surgery. I called patient back and spoke with spouse Ericka, on HIPAA, and explained that Dr Portillo would like to remove/biopsy Weds.  She said they are trying to figure out some things now at another appt.  She will also need to talk to her work. She will let me know by 1 pm tomorrow.

## 2024-05-16 NOTE — TELEPHONE ENCOUNTER
I called patient because I noticed he cancelled his appt for today. He needs to be seen and scheduled for procedure ASAP.  He said that he was in [place] for and appt with either neurology or urology (couldn't tell which) and he \"didn't know how long they would be keeping him.    If patient returns call please work in schedule ASAP.

## 2024-05-17 NOTE — TELEPHONE ENCOUNTER
I called patient and he said he never spoke to his wife regarding our phone call yesterday.  I explained again we would like to do it on Weds, but we need to make a plan today so that he can hold medications, get auth, etc.  He said he needs to speak to his wife and he will call back. I advised that I need to know by 1 pm today in order to proceed.  He agreed.

## 2024-05-20 ENCOUNTER — OFFICE VISIT (OUTPATIENT)
Dept: INTERNAL MEDICINE CLINIC | Age: 54
End: 2024-05-20

## 2024-05-20 VITALS
WEIGHT: 310.4 LBS | BODY MASS INDEX: 38.8 KG/M2 | TEMPERATURE: 98.2 F | DIASTOLIC BLOOD PRESSURE: 73 MMHG | SYSTOLIC BLOOD PRESSURE: 119 MMHG | HEART RATE: 66 BPM

## 2024-05-20 DIAGNOSIS — E11.40 TYPE 2 DIABETES MELLITUS WITH DIABETIC NEUROPATHY, WITH LONG-TERM CURRENT USE OF INSULIN (HCC): Primary | ICD-10-CM

## 2024-05-20 DIAGNOSIS — Z79.4 TYPE 2 DIABETES MELLITUS WITH DIABETIC NEUROPATHY, WITH LONG-TERM CURRENT USE OF INSULIN (HCC): Primary | ICD-10-CM

## 2024-05-20 PROCEDURE — 99999 PR OFFICE/OUTPT VISIT,PROCEDURE ONLY: CPT | Performed by: PHARMACIST

## 2024-05-20 NOTE — PROGRESS NOTES
rosuvastatin  Last microalbumin/creatinine ratio:   Microalb/Creat Ratio   Date Value Ref Range Status   05/16/2024 83.8 (H) 0 - 30.0 mg/Gm Final      Taking ACE/ARB: Yes- lisinopril  Depression: At risk (1/29/2024)    PHQ-2     PHQ-2 Score: 9        Focus:   Diabetes Education. Last A1C: 6.2% (3/21/24).   Last A1C at goal, but not able to evaluate glucose readings as patient has not been able to test. He states he has been unable to get his test strips from [facility] pharmacy. Hector states he has been overwhelmed lately with a new diagnosis of Parkinson's disease of which he has been started on Sinemet (following with Dr. Sue). He has noticed his memory is not as good as it used to. He feels he has been a bit more unsteady on his feet over the past four weeks but that Dr. Sue is aware. He does not feel this is related to his BG but has not been able to test. Reports 1 episode over the past 2 months where he felt shaky and sweaty and was resolved with PB crackers. He also states he has an upcoming surgery with ENT to remove a thyroid mass in June. He has previously expressed interest in the Eversense CGM but requests to hold off on pursuing for now due to upcoming surgeries and his new diagnosis. Discussed mechanism of new Farxiga prescribed by nephrology and patient is tolerating well. He is unsure which strength he is taking and thinks he picked it up from [facility] pharmacy - will call to verify. Still has not received shipment of Humalog from [facility] - instructed patient that the shipment should be in process as the office spoke with them last week. Ok to leave VM on wife's phone per pt. Called [facility] to verify test strips will be ready for  today (OneTouch Ultra brand per patient). Informed patient and patient's wife, Milka, per  to call [facility] if shipment is not received in the next day or two.     Curriculum Area Focus: Glucose checks/goals, Hypoglycemia management, Hyperglycemia management,

## 2024-05-20 NOTE — PATIENT INSTRUCTIONS
We will call [facility] to check on your blood sugar test strips. As soon as you are able to get you test strips, resume testing blood sugars 3-4 times daily.    2.   We will hold off on Eversense for now until after your surgeries.    3.   Be sure to drink enough water throughout the day with the new start of Farxiga

## 2024-05-22 DIAGNOSIS — Z79.4 TYPE 2 DIABETES MELLITUS WITH DIABETIC NEUROPATHY, WITH LONG-TERM CURRENT USE OF INSULIN (HCC): ICD-10-CM

## 2024-05-22 DIAGNOSIS — E11.40 TYPE 2 DIABETES MELLITUS WITH DIABETIC NEUROPATHY, WITH LONG-TERM CURRENT USE OF INSULIN (HCC): ICD-10-CM

## 2024-05-22 DIAGNOSIS — E11.9 TYPE 2 DIABETES MELLITUS WITHOUT COMPLICATION, WITH LONG-TERM CURRENT USE OF INSULIN (HCC): ICD-10-CM

## 2024-05-22 DIAGNOSIS — Z79.4 TYPE 2 DIABETES MELLITUS WITH HYPERGLYCEMIA, WITH LONG-TERM CURRENT USE OF INSULIN (HCC): Primary | ICD-10-CM

## 2024-05-22 DIAGNOSIS — Z79.4 TYPE 2 DIABETES MELLITUS WITHOUT COMPLICATION, WITH LONG-TERM CURRENT USE OF INSULIN (HCC): ICD-10-CM

## 2024-05-22 DIAGNOSIS — E83.42 HYPOMAGNESEMIA: ICD-10-CM

## 2024-05-22 DIAGNOSIS — E11.65 TYPE 2 DIABETES MELLITUS WITH HYPERGLYCEMIA, WITH LONG-TERM CURRENT USE OF INSULIN (HCC): Primary | ICD-10-CM

## 2024-05-25 RX ORDER — LANCETS 30 GAUGE
EACH MISCELLANEOUS
Qty: 400 EACH | Refills: 2 | Status: SHIPPED | OUTPATIENT
Start: 2024-05-25

## 2024-05-30 ENCOUNTER — TELEPHONE (OUTPATIENT)
Dept: INTERNAL MEDICINE CLINIC | Age: 54
End: 2024-05-30

## 2024-05-30 NOTE — TELEPHONE ENCOUNTER
Please call patient and find out if he is questioning if procedure needed, wants different ENT to do it?  Why is he refusing to do it?  I can refer to different ENT but if he wants to talk to me about it - set him up at 8:00 or try to fit him in a morning next week -I have lecture to give Thurs so really no open spots there.

## 2024-05-30 NOTE — TELEPHONE ENCOUNTER
Dr. Portillo asked me to give a message to patients PCP Kesha Hutson.  Patient was seen in our office 05/07/2024.  Dr. Portillo wanted him to have a laryngoscopy with biopsy with possible excision of a mass noted by GI provider Dr. Szymanski.  Patient has declined to have the procedure done.  Dr. Portillo wants to make sure that PCP is aware, and can maybe offer another ENT if patient was unhappy at our office.  Dr Portillo wants to be sure patient does receive treatment from someone.  Thank you!

## 2024-06-09 ENCOUNTER — HOSPITAL ENCOUNTER (INPATIENT)
Age: 54
LOS: 1 days | Discharge: HOME | DRG: 101 | End: 2024-06-09
Attending: STUDENT IN AN ORGANIZED HEALTH CARE EDUCATION/TRAINING PROGRAM | Admitting: STUDENT IN AN ORGANIZED HEALTH CARE EDUCATION/TRAINING PROGRAM
Payer: MEDICARE

## 2024-06-09 VITALS
HEART RATE: 66 BPM | RESPIRATION RATE: 14 BRPM | OXYGEN SATURATION: 92 % | SYSTOLIC BLOOD PRESSURE: 159 MMHG | DIASTOLIC BLOOD PRESSURE: 79 MMHG | TEMPERATURE: 98.4 F | WEIGHT: 310.63 LBS | BODY MASS INDEX: 38.62 KG/M2 | HEIGHT: 75 IN

## 2024-06-09 DIAGNOSIS — G40.919 BREAKTHROUGH SEIZURE (HCC): Primary | ICD-10-CM

## 2024-06-09 PROBLEM — G93.40 ENCEPHALOPATHY: Status: ACTIVE | Noted: 2024-06-09

## 2024-06-09 LAB
ALBUMIN SERPL-MCNC: 4.1 G/DL (ref 3.5–5.2)
ALBUMIN/GLOB SERPL: 2 {RATIO} (ref 1–2.5)
ALP SERPL-CCNC: 83 U/L (ref 40–129)
ALT SERPL-CCNC: 22 U/L (ref 10–50)
ANION GAP SERPL CALCULATED.3IONS-SCNC: 9 MMOL/L (ref 9–16)
AST SERPL-CCNC: 24 U/L (ref 10–50)
BILIRUB SERPL-MCNC: 1 MG/DL (ref 0–1.2)
BUN SERPL-MCNC: 11 MG/DL (ref 6–20)
CALCIUM SERPL-MCNC: 8.5 MG/DL (ref 8.6–10.4)
CHLORIDE SERPL-SCNC: 106 MMOL/L (ref 98–107)
CO2 SERPL-SCNC: 25 MMOL/L (ref 20–31)
CREAT SERPL-MCNC: 0.9 MG/DL (ref 0.7–1.2)
GFR, ESTIMATED: >90 ML/MIN/1.73M2
GLUCOSE BLD-MCNC: 203 MG/DL (ref 75–110)
GLUCOSE SERPL-MCNC: 211 MG/DL (ref 74–99)
INR PPP: 1.1
MAGNESIUM SERPL-MCNC: 1.8 MG/DL (ref 1.6–2.6)
PARTIAL THROMBOPLASTIN TIME: 28.4 SEC (ref 23–36.5)
PHOSPHATE SERPL-MCNC: 2.7 MG/DL (ref 2.5–4.5)
POTASSIUM SERPL-SCNC: 3.8 MMOL/L (ref 3.7–5.3)
PROT SERPL-MCNC: 6.3 G/DL (ref 6.6–8.7)
PROTHROMBIN TIME: 13.6 SEC (ref 11.7–14.9)
SODIUM SERPL-SCNC: 140 MMOL/L (ref 136–145)
TROPONIN I SERPL HS-MCNC: 22 NG/L (ref 0–22)

## 2024-06-09 PROCEDURE — 2000000000 HC ICU R&B

## 2024-06-09 PROCEDURE — 36415 COLL VENOUS BLD VENIPUNCTURE: CPT

## 2024-06-09 PROCEDURE — 6360000002 HC RX W HCPCS: Performed by: PSYCHIATRY & NEUROLOGY

## 2024-06-09 PROCEDURE — 95718 EEG PHYS/QHP 2-12 HR W/VEEG: CPT | Performed by: PSYCHIATRY & NEUROLOGY

## 2024-06-09 PROCEDURE — 99233 SBSQ HOSP IP/OBS HIGH 50: CPT | Performed by: STUDENT IN AN ORGANIZED HEALTH CARE EDUCATION/TRAINING PROGRAM

## 2024-06-09 PROCEDURE — 85730 THROMBOPLASTIN TIME PARTIAL: CPT

## 2024-06-09 PROCEDURE — 85610 PROTHROMBIN TIME: CPT

## 2024-06-09 PROCEDURE — 95813 EEG EXTND MNTR 61-119 MIN: CPT

## 2024-06-09 PROCEDURE — 93005 ELECTROCARDIOGRAM TRACING: CPT | Performed by: PSYCHIATRY & NEUROLOGY

## 2024-06-09 PROCEDURE — 82947 ASSAY GLUCOSE BLOOD QUANT: CPT

## 2024-06-09 PROCEDURE — 6370000000 HC RX 637 (ALT 250 FOR IP): Performed by: PSYCHIATRY & NEUROLOGY

## 2024-06-09 PROCEDURE — 84484 ASSAY OF TROPONIN QUANT: CPT

## 2024-06-09 PROCEDURE — 2580000003 HC RX 258: Performed by: PSYCHIATRY & NEUROLOGY

## 2024-06-09 PROCEDURE — 84100 ASSAY OF PHOSPHORUS: CPT

## 2024-06-09 PROCEDURE — 80053 COMPREHEN METABOLIC PANEL: CPT

## 2024-06-09 PROCEDURE — 83735 ASSAY OF MAGNESIUM: CPT

## 2024-06-09 RX ORDER — INSULIN LISPRO 100 [IU]/ML
0-4 INJECTION, SOLUTION INTRAVENOUS; SUBCUTANEOUS NIGHTLY
Status: DISCONTINUED | OUTPATIENT
Start: 2024-06-09 | End: 2024-06-09 | Stop reason: HOSPADM

## 2024-06-09 RX ORDER — POTASSIUM CHLORIDE 29.8 MG/ML
20 INJECTION INTRAVENOUS PRN
Status: DISCONTINUED | OUTPATIENT
Start: 2024-06-09 | End: 2024-06-09 | Stop reason: HOSPADM

## 2024-06-09 RX ORDER — LISINOPRIL 20 MG/1
20 TABLET ORAL 2 TIMES DAILY
Status: DISCONTINUED | OUTPATIENT
Start: 2024-06-09 | End: 2024-06-09 | Stop reason: HOSPADM

## 2024-06-09 RX ORDER — LACOSAMIDE 100 MG/1
300 TABLET ORAL 2 TIMES DAILY
Status: DISCONTINUED | OUTPATIENT
Start: 2024-06-09 | End: 2024-06-09 | Stop reason: HOSPADM

## 2024-06-09 RX ORDER — TOPIRAMATE 50 MG/1
50 TABLET, FILM COATED ORAL 2 TIMES DAILY
Status: DISCONTINUED | OUTPATIENT
Start: 2024-06-09 | End: 2024-06-09

## 2024-06-09 RX ORDER — ENOXAPARIN SODIUM 100 MG/ML
30 INJECTION SUBCUTANEOUS 2 TIMES DAILY
Status: DISCONTINUED | OUTPATIENT
Start: 2024-06-09 | End: 2024-06-09 | Stop reason: HOSPADM

## 2024-06-09 RX ORDER — DULOXETIN HYDROCHLORIDE 30 MG/1
60 CAPSULE, DELAYED RELEASE ORAL DAILY
Status: DISCONTINUED | OUTPATIENT
Start: 2024-06-09 | End: 2024-06-09 | Stop reason: HOSPADM

## 2024-06-09 RX ORDER — INSULIN LISPRO 100 [IU]/ML
0-4 INJECTION, SOLUTION INTRAVENOUS; SUBCUTANEOUS NIGHTLY
Status: DISCONTINUED | OUTPATIENT
Start: 2024-06-09 | End: 2024-06-09

## 2024-06-09 RX ORDER — SODIUM CHLORIDE 9 MG/ML
INJECTION, SOLUTION INTRAVENOUS PRN
Status: DISCONTINUED | OUTPATIENT
Start: 2024-06-09 | End: 2024-06-09 | Stop reason: HOSPADM

## 2024-06-09 RX ORDER — HALOPERIDOL 5 MG/ML
INJECTION INTRAMUSCULAR
Status: DISCONTINUED
Start: 2024-06-09 | End: 2024-06-09 | Stop reason: HOSPADM

## 2024-06-09 RX ORDER — LACOSAMIDE 100 MG/1
200 TABLET ORAL 2 TIMES DAILY
Status: DISCONTINUED | OUTPATIENT
Start: 2024-06-09 | End: 2024-06-09

## 2024-06-09 RX ORDER — PANTOPRAZOLE SODIUM 40 MG/1
40 TABLET, DELAYED RELEASE ORAL 2 TIMES DAILY
Status: DISCONTINUED | OUTPATIENT
Start: 2024-06-09 | End: 2024-06-09 | Stop reason: HOSPADM

## 2024-06-09 RX ORDER — ONDANSETRON 2 MG/ML
4 INJECTION INTRAMUSCULAR; INTRAVENOUS EVERY 6 HOURS PRN
Status: DISCONTINUED | OUTPATIENT
Start: 2024-06-09 | End: 2024-06-09 | Stop reason: HOSPADM

## 2024-06-09 RX ORDER — OXCARBAZEPINE 300 MG/1
300 TABLET, FILM COATED ORAL 2 TIMES DAILY
Status: DISCONTINUED | OUTPATIENT
Start: 2024-06-09 | End: 2024-06-09 | Stop reason: HOSPADM

## 2024-06-09 RX ORDER — ALBUTEROL SULFATE 90 UG/1
2 AEROSOL, METERED RESPIRATORY (INHALATION) 4 TIMES DAILY PRN
Status: DISCONTINUED | OUTPATIENT
Start: 2024-06-09 | End: 2024-06-09 | Stop reason: HOSPADM

## 2024-06-09 RX ORDER — GABAPENTIN 600 MG/1
600 TABLET ORAL 3 TIMES DAILY
Status: DISCONTINUED | OUTPATIENT
Start: 2024-06-09 | End: 2024-06-09 | Stop reason: HOSPADM

## 2024-06-09 RX ORDER — OXCARBAZEPINE 300 MG/1
300 TABLET, FILM COATED ORAL 2 TIMES DAILY
Qty: 60 TABLET | Refills: 0 | Status: SHIPPED | OUTPATIENT
Start: 2024-06-09

## 2024-06-09 RX ORDER — DEXTROSE MONOHYDRATE 100 MG/ML
INJECTION, SOLUTION INTRAVENOUS CONTINUOUS PRN
Status: DISCONTINUED | OUTPATIENT
Start: 2024-06-09 | End: 2024-06-09 | Stop reason: HOSPADM

## 2024-06-09 RX ORDER — POLYETHYLENE GLYCOL 3350 17 G/17G
17 POWDER, FOR SOLUTION ORAL DAILY PRN
Status: DISCONTINUED | OUTPATIENT
Start: 2024-06-09 | End: 2024-06-09 | Stop reason: HOSPADM

## 2024-06-09 RX ORDER — SODIUM CHLORIDE 0.9 % (FLUSH) 0.9 %
5-40 SYRINGE (ML) INJECTION PRN
Status: DISCONTINUED | OUTPATIENT
Start: 2024-06-09 | End: 2024-06-09 | Stop reason: HOSPADM

## 2024-06-09 RX ORDER — ACETAMINOPHEN 325 MG/1
650 TABLET ORAL EVERY 6 HOURS PRN
Status: DISCONTINUED | OUTPATIENT
Start: 2024-06-09 | End: 2024-06-09 | Stop reason: HOSPADM

## 2024-06-09 RX ORDER — SODIUM CHLORIDE 0.9 % (FLUSH) 0.9 %
5-40 SYRINGE (ML) INJECTION EVERY 12 HOURS SCHEDULED
Status: DISCONTINUED | OUTPATIENT
Start: 2024-06-09 | End: 2024-06-09 | Stop reason: HOSPADM

## 2024-06-09 RX ORDER — INSULIN LISPRO 100 [IU]/ML
0-8 INJECTION, SOLUTION INTRAVENOUS; SUBCUTANEOUS
Status: DISCONTINUED | OUTPATIENT
Start: 2024-06-09 | End: 2024-06-09 | Stop reason: HOSPADM

## 2024-06-09 RX ORDER — SODIUM CHLORIDE 9 MG/ML
INJECTION, SOLUTION INTRAVENOUS CONTINUOUS
Status: DISCONTINUED | OUTPATIENT
Start: 2024-06-09 | End: 2024-06-09

## 2024-06-09 RX ORDER — INSULIN LISPRO 100 [IU]/ML
0-4 INJECTION, SOLUTION INTRAVENOUS; SUBCUTANEOUS
Status: DISCONTINUED | OUTPATIENT
Start: 2024-06-09 | End: 2024-06-09

## 2024-06-09 RX ORDER — ACETAMINOPHEN 650 MG/1
650 SUPPOSITORY RECTAL EVERY 6 HOURS PRN
Status: DISCONTINUED | OUTPATIENT
Start: 2024-06-09 | End: 2024-06-09 | Stop reason: HOSPADM

## 2024-06-09 RX ORDER — ROSUVASTATIN CALCIUM 5 MG/1
5 TABLET, COATED ORAL DAILY
Status: DISCONTINUED | OUTPATIENT
Start: 2024-06-09 | End: 2024-06-09 | Stop reason: HOSPADM

## 2024-06-09 RX ORDER — LORAZEPAM 2 MG/ML
4 INJECTION INTRAMUSCULAR EVERY 5 MIN PRN
Status: DISCONTINUED | OUTPATIENT
Start: 2024-06-09 | End: 2024-06-09 | Stop reason: HOSPADM

## 2024-06-09 RX ORDER — MAGNESIUM SULFATE IN WATER 40 MG/ML
2000 INJECTION, SOLUTION INTRAVENOUS PRN
Status: DISCONTINUED | OUTPATIENT
Start: 2024-06-09 | End: 2024-06-09 | Stop reason: HOSPADM

## 2024-06-09 RX ORDER — POTASSIUM CHLORIDE 7.45 MG/ML
10 INJECTION INTRAVENOUS PRN
Status: DISCONTINUED | OUTPATIENT
Start: 2024-06-09 | End: 2024-06-09 | Stop reason: HOSPADM

## 2024-06-09 RX ORDER — ONDANSETRON 4 MG/1
4 TABLET, ORALLY DISINTEGRATING ORAL EVERY 8 HOURS PRN
Status: DISCONTINUED | OUTPATIENT
Start: 2024-06-09 | End: 2024-06-09 | Stop reason: HOSPADM

## 2024-06-09 RX ORDER — GLUCAGON 1 MG/ML
1 KIT INJECTION PRN
Status: DISCONTINUED | OUTPATIENT
Start: 2024-06-09 | End: 2024-06-09 | Stop reason: HOSPADM

## 2024-06-09 RX ORDER — THIAMINE HYDROCHLORIDE 100 MG/ML
100 INJECTION, SOLUTION INTRAMUSCULAR; INTRAVENOUS
Status: DISCONTINUED | OUTPATIENT
Start: 2024-06-09 | End: 2024-06-09 | Stop reason: HOSPADM

## 2024-06-09 RX ORDER — OXCARBAZEPINE 300 MG/1
300 TABLET, FILM COATED ORAL ONCE
Status: DISCONTINUED | OUTPATIENT
Start: 2024-06-09 | End: 2024-06-09 | Stop reason: HOSPADM

## 2024-06-09 RX ADMIN — DULOXETINE HYDROCHLORIDE 60 MG: 30 CAPSULE, DELAYED RELEASE ORAL at 10:57

## 2024-06-09 RX ADMIN — PANTOPRAZOLE SODIUM 40 MG: 40 TABLET, DELAYED RELEASE ORAL at 10:56

## 2024-06-09 RX ADMIN — LISINOPRIL 20 MG: 20 TABLET ORAL at 10:56

## 2024-06-09 RX ADMIN — SODIUM CHLORIDE, PRESERVATIVE FREE 10 ML: 5 INJECTION INTRAVENOUS at 10:59

## 2024-06-09 RX ADMIN — LACOSAMIDE 300 MG: 100 TABLET, FILM COATED ORAL at 10:56

## 2024-06-09 RX ADMIN — OXCARBAZEPINE 300 MG: 300 TABLET, FILM COATED ORAL at 11:47

## 2024-06-09 RX ADMIN — GABAPENTIN 600 MG: 600 TABLET, FILM COATED ORAL at 10:56

## 2024-06-09 RX ADMIN — ENOXAPARIN SODIUM 30 MG: 100 INJECTION SUBCUTANEOUS at 10:56

## 2024-06-09 RX ADMIN — SODIUM CHLORIDE: 9 INJECTION, SOLUTION INTRAVENOUS at 10:16

## 2024-06-09 RX ADMIN — CARBIDOPA AND LEVODOPA 1 TABLET: 25; 100 TABLET ORAL at 10:57

## 2024-06-09 RX ADMIN — ROSUVASTATIN CALCIUM 5 MG: 5 TABLET, COATED ORAL at 10:56

## 2024-06-09 RX ADMIN — INSULIN LISPRO 2 UNITS: 100 INJECTION, SOLUTION INTRAVENOUS; SUBCUTANEOUS at 11:01

## 2024-06-09 NOTE — PROGRESS NOTES
Pharmacy Note     Enoxaparin Dose Adjustment    Nicolas Mcakenzie is a 53 y.o. male. Pharmacist assessment of enoxaparin dose for VTE prophylaxis.    Recent Labs     06/08/24  1753   BUN 12       Recent Labs     06/08/24  1753   CREATININE 0.87       Estimated Creatinine Clearance: 149 mL/min (based on SCr of 0.87 mg/dL).      Height:   Ht Readings from Last 1 Encounters:   06/09/24 1.905 m (6' 3\")     Weight:  Wt Readings from Last 1 Encounters:   06/09/24 (!) 140.9 kg (310 lb 10.1 oz)       The following enoxaparin dose has been adjusted based upon renal function and/or patient weight per P&T Guidelines:             Lovenox adjusted to 30mg bid    -Douglas TylerD, [facility] 6/9/2024 9:28 AM

## 2024-06-09 NOTE — H&P
of the right external carotid artery (ECA).        LEFT CAROTID ARTERIES:    Normal left common carotid artery (CCA).  Normal left common carotid bulb.        Normal origin of the left internal carotid (ICA) artery without a    hemodynamically significant stenosis.  Normal visualized cervical portion    of the left internal carotid artery.        Normal origin of the left external carotid artery (ECA).        VERTEBRAL ARTERIES:    Normal bilateral vertebral arteries.    ___________________________________        IMPRESSION:    Normal CTA Head and neck with contrast.        N.B. : The above Results were Read Back by Jan Lewis MD to Mario Alberto Thomas DO, and understanding confirmed on 06/08/2024 20:48:16 (ET).        ASSESSMENT AND PLAN:         ASSESSMENT:     This is a 53 y.o. male with history of epilepsy, Parkinson's disease, arachnoid cyst, bipolar disorder, depression, DM2, DVT, Jeimy filter, HLD, HTN, migraine, neuropathy, pulmonary embolism who presented due to altered mentation to [place] ED and it was suspected that patient may have had an atypical seizure-like episode.  Patient had serial troponins drawn that were normal in conjunction with a an EKG that showed normal sinus rhythm.  CT of the head and neck showed no acute abnormality and CTA was also completed that did not show any acute abnormality.  Patient's mentation improved spontaneously after IV Vimpat and fluid bolus and patient was able to be weaned off of BiPAP and onto nasal cannula.  Upon evaluation after transfer to our facility patient is currently at his baseline mentation and denies any complaints.  He has been weaned off oxygen.     Patient care will be discussed with attending, will reevaluate patient along with attending.     PLAN/MEDICAL DECISION MAKING:    NEUROLOGIC:  Frequent episodes of behavioral arrest with hand picking automatisms  Recent history of ill formed visual hallucinations primarily consisting of shapes and

## 2024-06-09 NOTE — DISCHARGE INSTRUCTIONS
Please take your medications as advised. We have started you on a new medication called trileptal that should help with seizures and with your headaches. Please stop taking your sinemet and the Topamax. Please get the labwork we have ordered for you in one week. You will need to schedule a follow up appointment with your neurologist as soon as possible.     Please return to the ED if you notice further seizures, altered mentation, confusion, chest pain, shortness of breath, or for any other concern.

## 2024-06-09 NOTE — CARE COORDINATION
Transitional Planning  Patient was discharged before CM completed initial assessment    Discharge Report    [facility]  Clinical Case Management Department  Written by: Ne Roa RN    Patient Name: Nicolas Mackenzie  Attending Provider: No att. providers found  Admit Date: 2024  8:58 AM  MRN: [id number]  Account: [id number]                     : 1970  Discharge Date: 2024      Disposition: home    Ne Roa RN

## 2024-06-09 NOTE — PROCEDURES
PROCEDURE NOTE  Date: 6/9/2024   Name: Nicolas Mackenzie  YOB: 1970    Procedures    EEG REPORT       Patient: Nicolas Mackenzie Age: 53 y.o.  MRN: [id number]      Referring Provider: Naveen Enriquez, *    History: This routine 30 minute scalp EEG was recorded with video- monitoring for a 53 y.o.. male who presented with encephalopathy. This EEG was performed to evaluate for focal and epileptiform abnormalities.     Nicolas Mackenzie   Current Facility-Administered Medications   Medication Dose Route Frequency Provider Last Rate Last Admin    sodium chloride flush 0.9 % injection 5-40 mL  5-40 mL IntraVENous 2 times per day Tolla, Earle S, DO   10 mL at 06/09/24 1059    sodium chloride flush 0.9 % injection 5-40 mL  5-40 mL IntraVENous PRN Tolla, Earle S, DO        0.9 % sodium chloride infusion   IntraVENous PRN Tolla, Earle S, DO        potassium chloride 20 mEq/50 mL IVPB (Central Line)  20 mEq IntraVENous PRN Tolla, Earle S, DO        Or    potassium chloride 10 mEq/100 mL IVPB (Peripheral Line)  10 mEq IntraVENous PRN Tolla, Earle S, DO        magnesium sulfate 2000 mg in 50 mL IVPB premix  2,000 mg IntraVENous PRN Tolla, Earle S, DO        enoxaparin Sodium (LOVENOX) injection 30 mg  30 mg SubCUTAneous BID Tolla, Earle S, DO   30 mg at 06/09/24 1056    ondansetron (ZOFRAN-ODT) disintegrating tablet 4 mg  4 mg Oral Q8H PRN Tolla, Earle S, DO        Or    ondansetron (ZOFRAN) injection 4 mg  4 mg IntraVENous Q6H PRN Tolla, Earle S, DO        polyethylene glycol (GLYCOLAX) packet 17 g  17 g Oral Daily PRN Tolla, Earle S, DO        acetaminophen (TYLENOL) tablet 650 mg  650 mg Oral Q6H PRN Tolla, Earle S, DO        Or    acetaminophen (TYLENOL) suppository 650 mg  650 mg Rectal Q6H PRN Tolla, Earle S, DO        thiamine (B-1) injection 100 mg  100 mg IntraVENous Once PRN Tolla, Earle S, DO        LORazepam (ATIVAN) injection 4 mg  4 mg IntraVENous Q5 Min PRN Tolla, Earle S, DO        albuterol sulfate HFA

## 2024-06-09 NOTE — PROGRESS NOTES
Patient arrived to room 119 at approximately 0900. Writer had not yet received report from outlying facility. Wife filled writer in on situation--patient had went unresponsive last night, 6/8, at around 1730 while at the dinner table with Mom and Dad. Patient was only responding to painful stimuli and had sonorous respirations upon arrival to [facility]. The decision was made to place the patient on bipap to which he responded very well and began to become more alert and recognize his wife's presence at around 0030 on 6/9. Patient was A+Ox4 and moved to nasal cannula. Patient arrived on 4L NC which was able to be weaned off by 1000. Patient is tolerating room air well. Patient reports last seizure was in March and his seizures are usually tonic clonic in nature while this episode was different. Usually, patient states he has an aura where his head feels \"numb\" prior to a seizure. 1 hr eeg pending. Patient passed bedside nursing swallow test and was able to take pills without any issue. Wife, Sonya, at bedside. Plan of care ongoing.

## 2024-06-09 NOTE — DISCHARGE SUMMARY
structures are unremarkable.    CSF SPACES: Appropriate for age.  No hydrocephalus.  Basal cisterns are  patent.    CALVARIUM, SKULL BASE, PARANASAL SINUSES AND MASTOID AIR CELLS:  Polyp or mucous retention cyst in left maxillary sinus. There is postop  change status post right frontal craniotomy.    ORBITS: Both globes, extraocular muscles, optic nerves and retrobulbar fat  appear unremarkable.    IMPRESSION:    Mild to moderate periventricular white matter ischemic changes and postop  changes in the right frontal lobe. No acute bleed. If concern for acute  infarct MRI recommended    Electronically Signed:  Jan Lewis MD  2024/06/08 at 18:31 EDT  Reading Location ID and State: [address] [place] [address]  Tel [phone], Service support  [phone], Fax [phone]          cc: Kesha Hutson MD; Naveen Enriquez DO      Dictated by:  Jan Lewis MD on 06/08/24 1831  Transcribed by:  Jan Lewis on 06/08/24 1831    Report Signed by:  Jan Lewis MD on 06/08/24 1831        DISCHARGE INSTRUCTIONS     Discharge Medications:        Medication List        CONTINUE taking these medications      Alcohol Pads 70 % Pads  Use to check blood sugar 4x daily     glucose monitoring kit  Use to test blood sugar E11.65 Dispense brand preferred by patient insurance     Insulin Pen Needle 32G X 4 MM Misc  Use 5 times daily with insulin pens     * ONE TOUCH ULTRASOFT LANCETS Misc  Use to test blood sugars 4 times a day . DX:E11.40     * Lancets Misc  Use to test blood sugar 4 times a day DX:E11.65           * This list has 2 medication(s) that are the same as other medications prescribed for you. Read the directions carefully, and ask your doctor or other care provider to review them with you.                ASK your doctor about these medications      albuterol sulfate  (90 Base) MCG/ACT inhaler  Commonly known as: Ventolin HFA  Inhale 2 puffs into the lungs 4 times daily as needed for Wheezing or Shortness of

## 2024-06-10 ENCOUNTER — OFFICE VISIT (OUTPATIENT)
Dept: PULMONOLOGY | Age: 54
End: 2024-06-10
Payer: MEDICARE

## 2024-06-10 VITALS
TEMPERATURE: 99 F | HEART RATE: 68 BPM | SYSTOLIC BLOOD PRESSURE: 122 MMHG | OXYGEN SATURATION: 95 % | HEIGHT: 75 IN | BODY MASS INDEX: 37.58 KG/M2 | DIASTOLIC BLOOD PRESSURE: 70 MMHG | WEIGHT: 302.2 LBS

## 2024-06-10 DIAGNOSIS — I10 PRIMARY HYPERTENSION: ICD-10-CM

## 2024-06-10 DIAGNOSIS — R56.9 SEIZURES (HCC): ICD-10-CM

## 2024-06-10 DIAGNOSIS — E66.9 OBESITY (BMI 30-39.9): ICD-10-CM

## 2024-06-10 DIAGNOSIS — R41.3 MEMORY DEFICIT: ICD-10-CM

## 2024-06-10 DIAGNOSIS — G47.33 OSA (OBSTRUCTIVE SLEEP APNEA): Primary | ICD-10-CM

## 2024-06-10 DIAGNOSIS — R41.89 COGNITIVE DEFICITS: ICD-10-CM

## 2024-06-10 DIAGNOSIS — G20.A1 PARKINSON'S DISEASE, UNSPECIFIED WHETHER DYSKINESIA PRESENT, UNSPECIFIED WHETHER MANIFESTATIONS FLUCTUATE (HCC): ICD-10-CM

## 2024-06-10 LAB
EKG ATRIAL RATE: 67 BPM
EKG P AXIS: 8 DEGREES
EKG P-R INTERVAL: 192 MS
EKG Q-T INTERVAL: 410 MS
EKG QRS DURATION: 86 MS
EKG QTC CALCULATION (BAZETT): 433 MS
EKG R AXIS: -8 DEGREES
EKG T AXIS: -11 DEGREES

## 2024-06-10 PROCEDURE — 1111F DSCHRG MED/CURRENT MED MERGE: CPT | Performed by: INTERNAL MEDICINE

## 2024-06-10 PROCEDURE — G8427 DOCREV CUR MEDS BY ELIG CLIN: HCPCS | Performed by: INTERNAL MEDICINE

## 2024-06-10 PROCEDURE — G8417 CALC BMI ABV UP PARAM F/U: HCPCS | Performed by: INTERNAL MEDICINE

## 2024-06-10 PROCEDURE — 3078F DIAST BP <80 MM HG: CPT | Performed by: INTERNAL MEDICINE

## 2024-06-10 PROCEDURE — 1036F TOBACCO NON-USER: CPT | Performed by: INTERNAL MEDICINE

## 2024-06-10 PROCEDURE — 3074F SYST BP LT 130 MM HG: CPT | Performed by: INTERNAL MEDICINE

## 2024-06-10 PROCEDURE — 99204 OFFICE O/P NEW MOD 45 MIN: CPT | Performed by: INTERNAL MEDICINE

## 2024-06-10 PROCEDURE — 3017F COLORECTAL CA SCREEN DOC REV: CPT | Performed by: INTERNAL MEDICINE

## 2024-06-10 NOTE — PROGRESS NOTES
a mean oxygen saturation of 92.3%.  The  patient spent a total of 9.6 minutes below oxygen saturation less than  88%.     EKG MONITORING:  Revealed normal sinus rhythm.     IMPRESSION:  1.  Mild obstructive sleep apnea with worsening of respiratory events in  supine position and also in REM sleep.  2.  Decreased and delayed REM sleep.  3.  Periodic limb movements with no significant arousals.  4.  Nocturnal hypoxia.  The patient spent a total of 9.6 minutes below  oxygen saturation less than 88%.  5.  Bipolar disorder.  6.  Hypertension.  7.  Type 2 diabetes mellitus.  8.  History of tonsillectomy during childhood.     RECOMMENDATIONS:  The patient should be scheduled for followup with my  clinic as soon as possible to discuss about the sleep study findings for  further management.     Thanks to Tyrone Mon MD, for giving me this opportunity to  participate in the care of this pleasant gentleman.           EMANUEL FLYNN MD      Assessment      Diagnosis Orders   1. MISTI (obstructive sleep apnea)  Sleep Study with PAP Titration      2. Obesity (BMI 30-39.9)  Sleep Study with PAP Titration      3. Seizures (HCC)  Sleep Study with PAP Titration      4. Primary hypertension  Sleep Study with PAP Titration      5. Cognitive deficits  Sleep Study with PAP Titration      6. Memory deficit  Sleep Study with PAP Titration      7. Parkinson's disease, unspecified whether dyskinesia present, unspecified whether manifestations fluctuate (HCC)  Sleep Study with PAP Titration            Plan     Orders Placed This Encounter   Procedures    Sleep Study with PAP Titration     Standing Status:   Future     Standing Expiration Date:   6/10/2025     Scheduling Instructions:      Please do in lab split night sleep study if he qualifies per established criteria. Patient to follow with my clinic in 6 to 8 weeks on recommended CPAP/BiPAP therapy with down load.     Order Specific Question:   Sleep Study Titration Type

## 2024-06-17 ENCOUNTER — OFFICE VISIT (OUTPATIENT)
Dept: NEUROLOGY | Age: 54
End: 2024-06-17
Payer: MEDICARE

## 2024-06-17 VITALS
SYSTOLIC BLOOD PRESSURE: 136 MMHG | WEIGHT: 309 LBS | HEART RATE: 67 BPM | OXYGEN SATURATION: 95 % | BODY MASS INDEX: 38.42 KG/M2 | DIASTOLIC BLOOD PRESSURE: 82 MMHG | HEIGHT: 75 IN

## 2024-06-17 DIAGNOSIS — R25.1 TREMOR: Primary | ICD-10-CM

## 2024-06-17 PROCEDURE — G8417 CALC BMI ABV UP PARAM F/U: HCPCS | Performed by: PSYCHIATRY & NEUROLOGY

## 2024-06-17 PROCEDURE — 3017F COLORECTAL CA SCREEN DOC REV: CPT | Performed by: PSYCHIATRY & NEUROLOGY

## 2024-06-17 PROCEDURE — 1111F DSCHRG MED/CURRENT MED MERGE: CPT | Performed by: PSYCHIATRY & NEUROLOGY

## 2024-06-17 PROCEDURE — G8427 DOCREV CUR MEDS BY ELIG CLIN: HCPCS | Performed by: PSYCHIATRY & NEUROLOGY

## 2024-06-17 PROCEDURE — 3075F SYST BP GE 130 - 139MM HG: CPT | Performed by: PSYCHIATRY & NEUROLOGY

## 2024-06-17 PROCEDURE — 99214 OFFICE O/P EST MOD 30 MIN: CPT | Performed by: PSYCHIATRY & NEUROLOGY

## 2024-06-17 PROCEDURE — 3079F DIAST BP 80-89 MM HG: CPT | Performed by: PSYCHIATRY & NEUROLOGY

## 2024-06-17 PROCEDURE — 1036F TOBACCO NON-USER: CPT | Performed by: PSYCHIATRY & NEUROLOGY

## 2024-06-17 RX ORDER — ERENUMAB-AOOE 140 MG/ML
140 INJECTION, SOLUTION SUBCUTANEOUS
Qty: 2 ADJUSTABLE DOSE PRE-FILLED PEN SYRINGE | Refills: 3 | Status: SHIPPED | OUTPATIENT
Start: 2024-06-17 | End: 2024-08-16

## 2024-06-17 NOTE — PROGRESS NOTES
atraumatic  Eyes: Extraocular movements intact, eye lids normal  Lungs: Respirations unlabored, chest wall no deformity  ENT: Normal external ear canals, no sinus tenderness  Heart: Regular rate rhythm  Abdomen: No masses, tenderness  Extremities: No cyanosis or edema, 2+ pulses  Musculoskeletal: Normal range of motion in all joints  Skin: Intact, normal skin color    HEENT [] Hearing Loss  [] Visual Disturbance  [] Tinnitus  [] Eye pain   Respiratory [] Shortness of Breath  [] Cough  [] Snoring   Cardiovascular [] Chest Pain  [] Palpitations  [] Lightheaded   GI [] Constipation  [] Diarrhea  [] Swallowing change  [] Nausea/vomiting    [] Urinary Frequency  [] Urinary Urgency   Musculoskeletal [] Neck pain  [] Back pain  [] Muscle pain  [] Restless legs   Dermatologic [] Skin changes   Neurologic [] Memory loss/confusion  [x] Seizures  [] Trouble walking or imbalance  [] Dizziness  [] Sleep disturbance  [] Weakness  [] Numbness  [x] Tremors  [] Speech Difficulty  [] Headaches  [] Light Sensitivity  [] Sound Sensitivity   Endocrinology []Excessive thirst  []Excessive hunger   Psychiatric [] Anxiety/Depression  [] Hallucination   Allergy/immunology []Hives/environmental allergies   Hematologic/lymph [] Abnormal bleeding  [] Abnormal bruising     Neurological examination:    Mental status   Alert and oriented; intact memory with no confusion, speech or language problems; no hallucinations or delusions     Cranial nerves   II - visual fields intact to confrontation                                                III, IV, VI - extra-ocular muscles full: no pupillary defect; no KARIS, no nystagmus, no ptosis   V - normal facial sensation                                                               VII - normal facial symmetry                                                             VIII - intact hearing                                                                             IX, X - symmetrical palate

## 2024-06-17 NOTE — TELEPHONE ENCOUNTER
Spoke with patient's wife and Hector has already seen ENT at [facility] . Wife states that they were not happy with Dr. Portillo's office at all .

## 2024-06-20 ENCOUNTER — OFFICE VISIT (OUTPATIENT)
Dept: INTERNAL MEDICINE CLINIC | Age: 54
End: 2024-06-20

## 2024-06-20 VITALS
HEART RATE: 88 BPM | BODY MASS INDEX: 38.27 KG/M2 | TEMPERATURE: 97.6 F | WEIGHT: 307.8 LBS | SYSTOLIC BLOOD PRESSURE: 144 MMHG | DIASTOLIC BLOOD PRESSURE: 82 MMHG | HEIGHT: 75 IN

## 2024-06-20 DIAGNOSIS — Z79.4 TYPE 2 DIABETES MELLITUS WITH DIABETIC NEUROPATHY, WITH LONG-TERM CURRENT USE OF INSULIN (HCC): Primary | ICD-10-CM

## 2024-06-20 DIAGNOSIS — I10 ESSENTIAL HYPERTENSION: ICD-10-CM

## 2024-06-20 DIAGNOSIS — K21.9 GASTROESOPHAGEAL REFLUX DISEASE WITHOUT ESOPHAGITIS: ICD-10-CM

## 2024-06-20 DIAGNOSIS — E11.40 TYPE 2 DIABETES MELLITUS WITH DIABETIC NEUROPATHY, WITH LONG-TERM CURRENT USE OF INSULIN (HCC): Primary | ICD-10-CM

## 2024-06-20 DIAGNOSIS — E55.9 VITAMIN D DEFICIENCY: ICD-10-CM

## 2024-06-20 DIAGNOSIS — E72.20 HYPERAMMONEMIA (HCC): ICD-10-CM

## 2024-06-20 DIAGNOSIS — E78.2 MIXED HYPERLIPIDEMIA: ICD-10-CM

## 2024-06-20 LAB — HBA1C MFR BLD: 7.6 % (ref 4.3–5.7)

## 2024-06-20 RX ORDER — DAPAGLIFLOZIN 10 MG/1
10 TABLET, FILM COATED ORAL EVERY MORNING
Qty: 90 TABLET | Refills: 1 | Status: SHIPPED | OUTPATIENT
Start: 2024-06-20

## 2024-06-20 NOTE — PATIENT INSTRUCTIONS
Start vitamin D 5,000 IU daily OTC.    Try to have less sugary drinks, cereal.  Avoid night time snacking.

## 2024-06-20 NOTE — PROGRESS NOTES
01/15/2021    s/p lobectomy    Furuncle     legs    History of blood transfusion     History of Doppler ultrasound 05/29/2011    No hemodynamically significant carotid stenosis is identified. A thyroid nodule on each side. Dedicated ultrasound of thyroid gland is suggested to further evaluate if clinically indicated.     History of pulmonary embolism 2007    s/p GFF, related to knee surgery    Hx of blood clots     leg and lung    Hyperlipidemia     severely elevated triglycerides    Hypertension     diastolic    Intracranial arachnoid cyst 11/2012    Dr. Ruiz drained, complicated with seizures, DKA    Irritable bowel syndrome     Kidney failure 02/01/2023    Liver disease     Angi James - elevated LFT - positive smooth muscle antibody, steatosis per liver bx 3/2014 with Dr. Gregorio    MDRO (multiple drug resistant organisms) resistance 2012    MRSA (methicillin resistant staph aureus) culture positive     h/o in foot and before brain surgery    Nausea & vomiting 12/26/2014    Nephrolithiasis     noted on CT abdomen 9/2016, 6/2019, left renal calculi 2/2023 per renal u/s at [facility]    Neuropathy     Pancreatic insufficiency     Positive SANDY (antinuclear antibody)     Dr. Maharaj - first visit in 6/2013    Prolonged emergence from general anesthesia     Restless legs syndrome     Seizures (HCC)     started with brain surgery    Sinus tachycardia     Holter 3/2013 - seeing Dr. Valdez    Skull fracture (HCC)     clips     Sleep apnea     positive sleep apnea per study 10/2020, s/p UPPP surgery 2021    Type II or unspecified type diabetes mellitus without mention of complication, not stated as uncontrolled 2007       Past Surgical History:   Procedure Laterality Date    CARDIAC CATHETERIZATION      2011,5-6 years ago    CARDIAC CATHETERIZATION  03/2012    CARDIAC CATHETERIZATION  04/28/2016    [facility]     CARPAL TUNNEL RELEASE  01/2017    CHOLECYSTECTOMY  2004    COLONOSCOPY  2012    COLONOSCOPY  08/2016    2 tubular adenomas

## 2024-06-25 ENCOUNTER — TELEPHONE (OUTPATIENT)
Dept: INTERNAL MEDICINE CLINIC | Age: 54
End: 2024-06-25

## 2024-06-25 ENCOUNTER — OFFICE VISIT (OUTPATIENT)
Dept: INTERNAL MEDICINE CLINIC | Age: 54
End: 2024-06-25

## 2024-06-25 VITALS
SYSTOLIC BLOOD PRESSURE: 118 MMHG | DIASTOLIC BLOOD PRESSURE: 71 MMHG | HEART RATE: 72 BPM | TEMPERATURE: 98.7 F | WEIGHT: 313.8 LBS | BODY MASS INDEX: 39.22 KG/M2

## 2024-06-25 DIAGNOSIS — Z79.4 TYPE 2 DIABETES MELLITUS WITH DIABETIC NEUROPATHY, WITH LONG-TERM CURRENT USE OF INSULIN (HCC): Primary | ICD-10-CM

## 2024-06-25 DIAGNOSIS — E11.40 TYPE 2 DIABETES MELLITUS WITH DIABETIC NEUROPATHY, WITH LONG-TERM CURRENT USE OF INSULIN (HCC): Primary | ICD-10-CM

## 2024-06-25 PROCEDURE — G0108 DIAB MANAGE TRN  PER INDIV: HCPCS

## 2024-06-25 NOTE — TELEPHONE ENCOUNTER
Patient seen Leticia today and after appointment he stopped me and said he has been having bilateral leg swelling from the knee down for about a week. Left a little more then right . Please advise .

## 2024-06-25 NOTE — PROGRESS NOTES
[facility]  [address] [place] [address]  [phone] (phone)  [phone] (fax)    Patient ID: Nicolas Mackenzie 1970  Referring Provider: Dr. Hutson     Patient's name and  were verified.    Subjective:    He presents for a follow-up diabetic visit. He has type 2 diabetes mellitus. He is compliant some of the time.  Assessment:     Lab Results   Component Value Date/Time    LABA1C 7.6 2024 07:55 AM    LABA1C 6.2 2024 03:23 AM    BUN 11 2024 09:52 AM    CREATININE 0.9 2024 09:52 AM     Vitals:    24 1722   BP: 118/71   Pulse: 72   Temp: 98.7 °F (37.1 °C)   Weight: (!) 142.3 kg (313 lb 12.8 oz)     Wt Readings from Last 3 Encounters:   24 (!) 142.3 kg (313 lb 12.8 oz)   24 (!) 139.6 kg (307 lb 12.8 oz)   24 (!) 140.2 kg (309 lb)     Ht Readings from Last 3 Encounters:   24 1.905 m (6' 3\")   24 1.905 m (6' 3\")   06/10/24 1.905 m (6' 3\")     Est, Glom Filt Rate   Date Value Ref Range Status   2024 >90 >60 mL/min/1.73m2 Final     Diabetes Pharmacotherapy:  Farxiga 10mg daily - not started yet  Basaglar  80 units BID  Humalog 30 + gp 4 scale with meals - 2 meals daily     Glucose Trends:   Current monitoring regimen: Fingerstick blood tests - 2-3 times daily  Home blood sugar trends:               -Fasting AM: 143, 248, 350, 121, 195   -Before lunch: 167, 222, 120   -Before dinner: 149, 501, 174, 98   -Bedtime: 209, 150, 109  Any episodes of hypoglycemia? yes - 3x weekly; subjective lows, doesn't check reading   -Treats with PB crackers OR candy bar    Lifestyle Factors:   Previous visit with dietician: no  Current diet: B: skipping            L: eats with kids: hot dogs OR chicken nuggets + fruit                       D: chicken noodles/mashed potatoes OR steak/sweet potato                       Snacks: limited, sometimes crackers/tuna                       Beverages: back to drinking regular Coke  Current exercise:

## 2024-06-25 NOTE — PATIENT INSTRUCTIONS
Try some alternative artificial sweeteners - Stevia, Monkfruit   -Time to cut out the Coke again, or limit to the mini cans   -Consider Zevia (stevia sweetened soda)    2. See if limiting your salt/sodium intake and starting the Farxiga helps with your leg swelling    3. Consider the Eversense implantable blood sugar monitor again - this would require an appointment in [place] or [place] for placement every 6 months    4. Pharmacist will look into your blood sugar monitor

## 2024-06-26 NOTE — TELEPHONE ENCOUNTER
I attempted to call patient x 2 and no answer.  Please call him and find out if he started Farxiga - this has some diuretic effect. If issues getting this approved or too expensive - run by Leticia.  Set up venous doppler if unequal edema as he has had history of DVT.  Send to ED if SOB, chest pain, worsening edema.  Educate on low salt diet.  Elevated legs above level of heart if sitting.  Compression hose or ACE wrap if possible.  Need to make sure doppler has follow up - call me with update or run by on call person this weekend.

## 2024-06-27 NOTE — TELEPHONE ENCOUNTER
Patient refuses ultrasound, states he knows its not a blood clot. States he will adhere to low sodium diet and keep legs elevated. Encouraged patient to call office for appointment if symptoms persist.

## 2024-07-14 ENCOUNTER — HOSPITAL ENCOUNTER (OUTPATIENT)
Dept: SLEEP CENTER | Age: 54
Discharge: HOME | End: 2024-07-16
Payer: MEDICARE

## 2024-07-14 DIAGNOSIS — R56.9 SEIZURES (HCC): ICD-10-CM

## 2024-07-14 DIAGNOSIS — G47.33 OSA (OBSTRUCTIVE SLEEP APNEA): ICD-10-CM

## 2024-07-14 DIAGNOSIS — R41.3 MEMORY DEFICIT: ICD-10-CM

## 2024-07-14 DIAGNOSIS — R41.89 COGNITIVE DEFICITS: ICD-10-CM

## 2024-07-14 DIAGNOSIS — I10 PRIMARY HYPERTENSION: ICD-10-CM

## 2024-07-14 DIAGNOSIS — E66.9 OBESITY (BMI 30-39.9): ICD-10-CM

## 2024-07-14 DIAGNOSIS — G20.A1 PARKINSON'S DISEASE, UNSPECIFIED WHETHER DYSKINESIA PRESENT, UNSPECIFIED WHETHER MANIFESTATIONS FLUCTUATE (HCC): ICD-10-CM

## 2024-07-14 PROCEDURE — 95811 POLYSOM 6/>YRS CPAP 4/> PARM: CPT

## 2024-07-17 DIAGNOSIS — G47.33 OSA (OBSTRUCTIVE SLEEP APNEA): Primary | ICD-10-CM

## 2024-07-17 NOTE — PROGRESS NOTES
Called and lvm for patient seeing where he wants me to send pap order and let him know we need to r/s his appt scheduled for 7.29 as we need to see him after he's been set up on his pap and using it for atleast 30 days

## 2024-07-22 ENCOUNTER — TELEPHONE (OUTPATIENT)
Dept: PULMONOLOGY | Age: 54
End: 2024-07-22

## 2024-08-03 PROBLEM — L97.514 ULCER OF RIGHT FOOT WITH NECROSIS OF BONE (HCC): Status: RESOLVED | Noted: 2022-05-22 | Resolved: 2024-08-03

## 2024-08-05 DIAGNOSIS — K21.9 GASTROESOPHAGEAL REFLUX DISEASE WITHOUT ESOPHAGITIS: ICD-10-CM

## 2024-08-05 NOTE — TELEPHONE ENCOUNTER
----- Message from Kesha Hutson MD sent at 8/3/2024  3:14 PM EDT -----  Call Dr. Eaton for most recent DM eye exam.  I don't see one in the last year in media.

## 2024-08-05 NOTE — TELEPHONE ENCOUNTER
Last DM eye exam 7/21/23 per Maria Elena at [facility]. Patient canceled scheduled exam on 7/22 this year and did not reschedule.     Call placed to patient to remind him to reschedule diabetic eye exam. He stated he no longer goes to [facility], he goes to [facility].    Call placed to [facility], date of last DM eye exam was 3/28/2024. Will fax to [phone].

## 2024-08-05 NOTE — TELEPHONE ENCOUNTER
Pt requests refill.  Says he is having surgery at the [facility] on 8/7/24 for nodules in his thyroid and esophagus and they were not sure if they will keep him over night.  Pt not sure if able to keep appt on 8/8/24.  I checked the gabapentin history.   I called [facility] pharmacy.  They filled on refills from December 2023.  Pt first filled on April, May and June.

## 2024-08-07 RX ORDER — LISINOPRIL 20 MG/1
20 TABLET ORAL DAILY
Qty: 30 TABLET | Refills: 2 | Status: SHIPPED | OUTPATIENT
Start: 2024-08-07

## 2024-08-07 RX ORDER — GABAPENTIN 600 MG/1
600 TABLET ORAL 3 TIMES DAILY
Qty: 90 TABLET | Refills: 2 | Status: SHIPPED | OUTPATIENT
Start: 2024-08-07 | End: 2024-11-05

## 2024-08-07 RX ORDER — PANTOPRAZOLE SODIUM 40 MG/1
40 TABLET, DELAYED RELEASE ORAL 2 TIMES DAILY
Qty: 180 TABLET | Refills: 1 | Status: SHIPPED | OUTPATIENT
Start: 2024-08-07

## 2024-08-12 DIAGNOSIS — R11.2 NON-INTRACTABLE VOMITING WITH NAUSEA: ICD-10-CM

## 2024-08-12 RX ORDER — PROMETHAZINE HYDROCHLORIDE 12.5 MG/1
12.5-25 TABLET ORAL EVERY 8 HOURS PRN
Qty: 60 TABLET | Refills: 1 | Status: CANCELLED | OUTPATIENT
Start: 2024-08-12

## 2024-08-13 ENCOUNTER — LAB (OUTPATIENT)
Dept: LAB | Age: 54
End: 2024-08-13

## 2024-08-13 ENCOUNTER — OFFICE VISIT (OUTPATIENT)
Dept: INTERNAL MEDICINE CLINIC | Age: 54
End: 2024-08-13

## 2024-08-13 VITALS
OXYGEN SATURATION: 99 % | BODY MASS INDEX: 37.13 KG/M2 | DIASTOLIC BLOOD PRESSURE: 76 MMHG | HEART RATE: 75 BPM | WEIGHT: 298.6 LBS | HEIGHT: 75 IN | SYSTOLIC BLOOD PRESSURE: 112 MMHG

## 2024-08-13 DIAGNOSIS — Z79.4 TYPE 2 DIABETES MELLITUS WITH DIABETIC NEUROPATHY, WITH LONG-TERM CURRENT USE OF INSULIN (HCC): ICD-10-CM

## 2024-08-13 DIAGNOSIS — R11.2 NON-INTRACTABLE VOMITING WITH NAUSEA: ICD-10-CM

## 2024-08-13 DIAGNOSIS — R56.9 SEIZURES (HCC): ICD-10-CM

## 2024-08-13 DIAGNOSIS — E03.9 HYPOTHYROIDISM, UNSPECIFIED TYPE: ICD-10-CM

## 2024-08-13 DIAGNOSIS — R42 LIGHTHEADEDNESS: Primary | ICD-10-CM

## 2024-08-13 DIAGNOSIS — R42 LIGHTHEADEDNESS: ICD-10-CM

## 2024-08-13 DIAGNOSIS — G47.33 OSA (OBSTRUCTIVE SLEEP APNEA): ICD-10-CM

## 2024-08-13 DIAGNOSIS — I10 ESSENTIAL HYPERTENSION: ICD-10-CM

## 2024-08-13 DIAGNOSIS — E11.40 TYPE 2 DIABETES MELLITUS WITH DIABETIC NEUROPATHY, WITH LONG-TERM CURRENT USE OF INSULIN (HCC): ICD-10-CM

## 2024-08-13 PROBLEM — E11.621 ULCER OF FOOT DUE TO TYPE 2 DIABETES MELLITUS (HCC): Status: RESOLVED | Noted: 2022-03-19 | Resolved: 2024-08-13

## 2024-08-13 PROBLEM — L98.491 CHRONIC SKIN ULCER, LIMITED TO BREAKDOWN OF SKIN (HCC): Status: RESOLVED | Noted: 2022-01-13 | Resolved: 2024-08-13

## 2024-08-13 PROBLEM — L97.509 ULCER OF FOOT DUE TO TYPE 2 DIABETES MELLITUS (HCC): Status: RESOLVED | Noted: 2022-03-19 | Resolved: 2024-08-13

## 2024-08-13 LAB
ANION GAP SERPL CALC-SCNC: 10 MEQ/L (ref 8–16)
BASOPHILS ABSOLUTE: 0 THOU/MM3 (ref 0–0.1)
BASOPHILS NFR BLD AUTO: 0.5 %
BUN SERPL-MCNC: 14 MG/DL (ref 7–22)
CALCIUM SERPL-MCNC: 8.9 MG/DL (ref 8.5–10.5)
CHLORIDE SERPL-SCNC: 99 MEQ/L (ref 98–111)
CO2 SERPL-SCNC: 27 MEQ/L (ref 23–33)
CREAT SERPL-MCNC: 1.4 MG/DL (ref 0.4–1.2)
DEPRECATED RDW RBC AUTO: 42 FL (ref 35–45)
EOSINOPHIL NFR BLD AUTO: 3 %
EOSINOPHILS ABSOLUTE: 0.2 THOU/MM3 (ref 0–0.4)
ERYTHROCYTE [DISTWIDTH] IN BLOOD BY AUTOMATED COUNT: 12.9 % (ref 11.5–14.5)
GFR SERPL CREATININE-BSD FRML MDRD: 60 ML/MIN/1.73M2
GLUCOSE SERPL-MCNC: 364 MG/DL (ref 70–108)
HCT VFR BLD AUTO: 43.7 % (ref 42–52)
HGB BLD-MCNC: 15.4 GM/DL (ref 14–18)
IMM GRANULOCYTES # BLD AUTO: 0.04 THOU/MM3 (ref 0–0.07)
IMM GRANULOCYTES NFR BLD AUTO: 0.5 %
LYMPHOCYTES ABSOLUTE: 1.7 THOU/MM3 (ref 1–4.8)
LYMPHOCYTES NFR BLD AUTO: 23.5 %
MCH RBC QN AUTO: 32 PG (ref 26–33)
MCHC RBC AUTO-ENTMCNC: 35.2 GM/DL (ref 32.2–35.5)
MCV RBC AUTO: 90.9 FL (ref 80–94)
MONOCYTES ABSOLUTE: 0.6 THOU/MM3 (ref 0.4–1.3)
MONOCYTES NFR BLD AUTO: 8.7 %
NEUTROPHILS ABSOLUTE: 4.7 THOU/MM3 (ref 1.8–7.7)
NEUTROPHILS NFR BLD AUTO: 63.8 %
NRBC BLD AUTO-RTO: 0 /100 WBC
PLATELET # BLD AUTO: 233 THOU/MM3 (ref 130–400)
PMV BLD AUTO: 11.1 FL (ref 9.4–12.4)
POTASSIUM SERPL-SCNC: 5.1 MEQ/L (ref 3.5–5.2)
RBC # BLD AUTO: 4.81 MILL/MM3 (ref 4.7–6.1)
SODIUM SERPL-SCNC: 136 MEQ/L (ref 135–145)
WBC # BLD AUTO: 7.4 THOU/MM3 (ref 4.8–10.8)

## 2024-08-13 RX ORDER — PROMETHAZINE HYDROCHLORIDE 12.5 MG/1
12.5-25 TABLET ORAL EVERY 8 HOURS PRN
Qty: 60 TABLET | Refills: 1 | Status: SHIPPED | OUTPATIENT
Start: 2024-08-13

## 2024-08-13 NOTE — PATIENT INSTRUCTIONS
Push fluids and monitor, get labs.  Didn't change lisinopril yet but if SBP drops to < 100, cut lisinopril in 1/2.

## 2024-08-13 NOTE — PROGRESS NOTES
1970    Chief Complaint   Patient presents with    Hypotension     When standing    head numbness    Dizziness       Pt is a 54 y.o. male who presents for a 2 month follow up visit    HPI    Hypopharyngeal lesion - he had laryngoscopy at [facility]/Dr Davila 8/7/24 - excision of 1 x 0.5 cm lesion - it was benign.  Venous hemangioma on path.  He is to get a modified barium swallow evaluation 9/2024 due to complaints of mild choking with food.    Lightheaded with standing up x 3 days, numbness in head all over, no vertigo - no head trauma.  All since surgery - not feeling good - dry heaves, nausea.  Glucose 119 this am.  Yesterday wife did orthostatic - 160 standing and 100/74 standing at home.  Last night was first night actually ate a full meal since surgery.  No fever chills, uti symptoms, stomach pain. Not orthostatic in office. Last time vomited was Sunday.  Recommended he push fluids and monitor, get labs.  Didn't change lisinopril yet but if SBP drops to < 100, cut lisinopril in 1/2.      He now has formal dx of sleep apnea - to get CPAP 8/20/24.     FSBS good at home.     Seizure disorder - No seizures since end of June 2024.  [facility] started Trileptal 6/2024.  He recently saw Dr. Holder 7/18/24.  Dr. Holder believes he did have history of tonic clonic seizures in the past which are controlled.  But the head numbness, right arm tremor/flapping, and head numbness with loss of memory - are psychogenic non-epileptic events as noted on previous inpatient video/EEG monitoring in 2021.  He recommended only adjusting non-epileptic medication for definite generalized tonic clonic seizures.  He recommended cognitive behavioral therapy for these non-epileptic events - patient to ask mental health provider for referral.  Follow with Dr. Holder in 1/2025.    Below was discussed at previous visit;  DM - uncontrolled - HgA1c 6.2 -> 7.6 6/20/24 today on Basaglar 80 Units BID, short acting 30 plus SSI with meals.  Too

## 2024-08-22 ENCOUNTER — TELEPHONE (OUTPATIENT)
Dept: INTERNAL MEDICINE CLINIC | Age: 54
End: 2024-08-22

## 2024-08-22 NOTE — TELEPHONE ENCOUNTER
Patient is requesting referral to [facility] cardiology for lightheadedness , HTN . Patient has not seen cardiology for about 4 years and will likely need a referral . He has contacted cardiology to get something set up . Okay to send referral ?

## 2024-08-26 ENCOUNTER — TELEPHONE (OUTPATIENT)
Dept: INTERNAL MEDICINE CLINIC | Age: 54
End: 2024-08-26

## 2024-08-26 ENCOUNTER — OFFICE VISIT (OUTPATIENT)
Dept: CARDIOLOGY CLINIC | Age: 54
End: 2024-08-26
Payer: MEDICARE

## 2024-08-26 VITALS
HEIGHT: 75 IN | BODY MASS INDEX: 36.88 KG/M2 | WEIGHT: 296.6 LBS | SYSTOLIC BLOOD PRESSURE: 120 MMHG | HEART RATE: 68 BPM | DIASTOLIC BLOOD PRESSURE: 62 MMHG
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
## 2024-08-26 PROCEDURE — 3074F SYST BP LT 130 MM HG: CPT | Performed by: INTERNAL MEDICINE

## 2024-08-26 PROCEDURE — 99214 OFFICE O/P EST MOD 30 MIN: CPT | Performed by: INTERNAL MEDICINE

## 2024-08-26 PROCEDURE — G8417 CALC BMI ABV UP PARAM F/U: HCPCS | Performed by: INTERNAL MEDICINE

## 2024-08-26 PROCEDURE — 3017F COLORECTAL CA SCREEN DOC REV: CPT | Performed by: INTERNAL MEDICINE

## 2024-08-26 PROCEDURE — G8427 DOCREV CUR MEDS BY ELIG CLIN: HCPCS | Performed by: INTERNAL MEDICINE

## 2024-08-26 PROCEDURE — 3078F DIAST BP <80 MM HG: CPT | Performed by: INTERNAL MEDICINE

## 2024-08-26 PROCEDURE — 1036F TOBACCO NON-USER: CPT | Performed by: INTERNAL MEDICINE

## 2024-08-26 RX ORDER — MIDODRINE HYDROCHLORIDE 5 MG/1
5 TABLET ORAL 3 TIMES DAILY
Qty: 90 TABLET | Refills: 3 | Status: SHIPPED | OUTPATIENT
Start: 2024-08-26

## 2024-08-26 NOTE — PATIENT INSTRUCTIONS
Your nurses today were JEWEL Jacobs and SEVERIANO Cano  Your provider today was Dr. Zapata  Phone number: [phone]      You may receive a survey regarding the care you received during your visit.  Your input is valuable to us.  We encourage you to complete and return your survey.  We hope you will choose us in the future for your healthcare needs.

## 2024-08-26 NOTE — PROGRESS NOTES
NP, pt states has seen Dr. Zapata in the past.   He has had some dizzy spells.  He has fallen 7 times in the last week.   Recently had esophageal nodule removed at [facility], he was told it was benign.

## 2024-08-26 NOTE — PROGRESS NOTES
[facility] CARDIOLOGY  [address]  [address]  [place] [address]  Dept: [phone]  Dept Fax: [phone]  Loc: [phone]    Visit Date: 8/26/2024    Mr. Mackenzie is a 54 y.o. male  who presented for:  Chief Complaint   Patient presents with    New Patient    Dizziness       HPI:   55 yo M c hx of Bipolar disorder, PE, Seizure disorder, DM, HTN is for a follow up.   Had LHC in 4/2016 without significant disease.  Echo in 9/2019 showed normal LVSF.  Stress test in 4/2018 showed no ischemia.  He has been reports that his bP drops with standing up.  Wife is apparently a RN at [employer].          Current Outpatient Medications:     midodrine (PROAMATINE) 5 MG tablet, Take 1 tablet by mouth 3 times daily, Disp: 90 tablet, Rfl: 3    promethazine (PHENERGAN) 12.5 MG tablet, Take 1-2 tablets by mouth every 8 hours as needed for Nausea, Disp: 60 tablet, Rfl: 1    gabapentin (NEURONTIN) 600 MG tablet, Take 1 tablet by mouth 3 times daily for 90 days., Disp: 90 tablet, Rfl: 2    lisinopril (PRINIVIL;ZESTRIL) 20 MG tablet, Take 1 tablet by mouth daily, Disp: 30 tablet, Rfl: 2    pantoprazole (PROTONIX) 40 MG tablet, Take 1 tablet by mouth 2 times daily, Disp: 180 tablet, Rfl: 1    dapagliflozin (FARXIGA) 10 MG tablet, Take 1 tablet by mouth every morning, Disp: 90 tablet, Rfl: 1    OXcarbazepine (TRILEPTAL) 300 MG tablet, Take 1 tablet by mouth 2 times daily, Disp: 60 tablet, Rfl: 0    Lancets MISC, Use to test blood sugar 4 times a day DX:E11.65, Disp: 400 each, Rfl: 2    Alcohol Swabs (ALCOHOL PADS) 70 % PADS, Use to check blood sugar 4x daily, Disp: 400 each, Rfl: 2    blood glucose monitor strips, Use to test blood sugar 4x daily. E11.65 Dispense brand preferred by patient insurance, Disp: 400 strip, Rfl: 2    glucose monitoring kit, Use to test blood sugar E11.65 Dispense brand preferred by patient insurance, Disp: 1 kit, Rfl: 0    insulin lispro, 1 Unit Dial, (HUMALOG  up.       Electronically signed by Suleman Zapata MD [facility]  8/26/2024 at 8:25 AM EDT

## 2024-08-29 ENCOUNTER — TELEPHONE (OUTPATIENT)
Dept: INTERNAL MEDICINE CLINIC | Age: 54
End: 2024-08-29

## 2024-08-29 NOTE — TELEPHONE ENCOUNTER
Patient left voicemail trying to reach nurse. Called patient back, phone answered by wife Sonya, states he is in the hospital and would not put patient on phone. No further information given.

## 2024-09-03 ENCOUNTER — OFFICE VISIT (OUTPATIENT)
Dept: INTERNAL MEDICINE CLINIC | Age: 54
End: 2024-09-03
Payer: MEDICARE

## 2024-09-03 VITALS
SYSTOLIC BLOOD PRESSURE: 118 MMHG | WEIGHT: 301 LBS | HEIGHT: 75 IN | HEART RATE: 73 BPM | TEMPERATURE: 97.3 F | DIASTOLIC BLOOD PRESSURE: 60 MMHG | BODY MASS INDEX: 37.42 KG/M2

## 2024-09-03 DIAGNOSIS — E11.40 TYPE 2 DIABETES MELLITUS WITH DIABETIC NEUROPATHY, WITH LONG-TERM CURRENT USE OF INSULIN (HCC): Primary | ICD-10-CM

## 2024-09-03 DIAGNOSIS — Z79.4 TYPE 2 DIABETES MELLITUS WITH DIABETIC NEUROPATHY, WITH LONG-TERM CURRENT USE OF INSULIN (HCC): Primary | ICD-10-CM

## 2024-09-03 PROCEDURE — NBSRV NON-BILLABLE SERVICE: Performed by: REGISTERED NURSE

## 2024-09-03 PROCEDURE — G0108 DIAB MANAGE TRN  PER INDIV: HCPCS | Performed by: REGISTERED NURSE

## 2024-09-03 RX ORDER — METOPROLOL TARTRATE 25 MG/1
25 TABLET, FILM COATED ORAL 2 TIMES DAILY
COMMUNITY
Start: 2024-08-29

## 2024-09-03 NOTE — TELEPHONE ENCOUNTER
[facility] pharmacy requesting a Humalog script for 4 monthly supply to control shipping costs. Script pended for 4 months supply Humalog

## 2024-09-03 NOTE — PATIENT INSTRUCTIONS
No changes today  Keep working with the doctors on your low blood pressure/ dizziness  Call Dr. Zabala's and see if you can get in sooner for your right foot  Keep checking your blood sugars before meals and bedtime          --use your correction scale in the morning even if you are not eating        Try the Dexcom G7 again --with skin tact/ outer dressing               --try your outer thigh-make sure you shave the spot you're going to use        Talk to your wife again about the Eversense continuous glucose monitor  If your before meal blood sugar is over 200 before eating--try to take your                 Humalog before you eat         --under 200, you can wait to see how well you are going to eat

## 2024-09-03 NOTE — PROGRESS NOTES
[facility]  [address] [place] [address]  [phone] (phone)  [phone] (fax)    Patient ID: Nicolas Mackenzie 1970  Referring Provider: Dr. JODY Hutson     Patient's name and  were verified.    Subjective:    He presents for a follow-up diabetic visit. He has type 2 diabetes mellitus. He is compliant some of the time.  Assessment:     Lab Results   Component Value Date/Time    LABA1C 7.6 2024 07:55 AM    LABA1C 6.2 2024 03:23 AM    BUN 20 2024 05:10 PM    CREATININE 1.24 2024 05:10 PM     Vitals:    24 1646   BP: 118/60   Site: Left Upper Arm   Position: Sitting   Pulse: 73   Temp: 97.3 °F (36.3 °C)   Weight: (!) 136.5 kg (301 lb)   Height: 1.905 m (6' 3\")     Wt Readings from Last 3 Encounters:   24 (!) 136.5 kg (301 lb)   24 134.5 kg (296 lb 9.6 oz)   24 135.4 kg (298 lb 9.6 oz)     Ht Readings from Last 3 Encounters:   24 1.905 m (6' 3\")   24 1.905 m (6' 3\")   24 1.905 m (6' 3\")     Est, Glom Filt Rate   Date Value Ref Range Status   2024 60 >60 ml/min/1.73m2 Final   2024 90 >60 ml/min/1.73m2 Final     Diabetes Pharmacotherapy:  Farxiga 10mg daily  Basaglar 80 units morning and 80 units nights  Humalog 30 units plus scale. Group 4 ordered. Using Group 5. (5 unit increments)    Glucose Trends:   Current monitoring regimen: Fingerstick blood tests - 1-3 times daily  Home blood sugar trends:    -Fasting AM: 174-330   -Before lunch: 153-355   -Before dinner: 123-303   -Bedtime: 378  Any episodes of hypoglycemia? no   -Treats with na    Lifestyle Factors:   Previous visit with dietician: remote  Current diet: Brunch 11a-2pm olive garden soup; salad; no bread sticks            L: none                       D: 7-8pmeatloaf; m potato; veg                       Snacks: rare                       Beverages: water; splenda koolaide  Current exercise: ADL's- limited/ low activity    Health Maintenance:  Diabetes

## 2024-09-04 RX ORDER — INSULIN LISPRO 100 [IU]/ML
INJECTION, SOLUTION INTRAVENOUS; SUBCUTANEOUS
Qty: 60 ADJUSTABLE DOSE PRE-FILLED PEN SYRINGE | Refills: 2 | Status: SHIPPED | OUTPATIENT
Start: 2024-09-04

## 2024-09-09 ENCOUNTER — OFFICE VISIT (OUTPATIENT)
Dept: INTERNAL MEDICINE CLINIC | Age: 54
End: 2024-09-09

## 2024-09-09 VITALS
BODY MASS INDEX: 36.43 KG/M2 | SYSTOLIC BLOOD PRESSURE: 136 MMHG | HEIGHT: 75 IN | HEART RATE: 67 BPM | OXYGEN SATURATION: 94 % | DIASTOLIC BLOOD PRESSURE: 78 MMHG | WEIGHT: 293 LBS

## 2024-09-09 DIAGNOSIS — Z79.4 TYPE 2 DIABETES MELLITUS WITH BOTH EYES AFFECTED BY MILD NONPROLIFERATIVE RETINOPATHY WITHOUT MACULAR EDEMA, WITH LONG-TERM CURRENT USE OF INSULIN (HCC): ICD-10-CM

## 2024-09-09 DIAGNOSIS — R11.2 NAUSEA AND VOMITING, UNSPECIFIED VOMITING TYPE: ICD-10-CM

## 2024-09-09 DIAGNOSIS — E11.40 TYPE 2 DIABETES MELLITUS WITH DIABETIC NEUROPATHY, WITH LONG-TERM CURRENT USE OF INSULIN (HCC): ICD-10-CM

## 2024-09-09 DIAGNOSIS — R39.11 URINARY HESITANCY: ICD-10-CM

## 2024-09-09 DIAGNOSIS — R56.9 SEIZURES (HCC): ICD-10-CM

## 2024-09-09 DIAGNOSIS — I95.9 HYPOTENSIVE EPISODE: Primary | ICD-10-CM

## 2024-09-09 DIAGNOSIS — E11.3293 TYPE 2 DIABETES MELLITUS WITH BOTH EYES AFFECTED BY MILD NONPROLIFERATIVE RETINOPATHY WITHOUT MACULAR EDEMA, WITH LONG-TERM CURRENT USE OF INSULIN (HCC): ICD-10-CM

## 2024-09-09 DIAGNOSIS — E04.1 THYROID NODULE: ICD-10-CM

## 2024-09-09 DIAGNOSIS — Z79.4 TYPE 2 DIABETES MELLITUS WITH DIABETIC NEUROPATHY, WITH LONG-TERM CURRENT USE OF INSULIN (HCC): ICD-10-CM

## 2024-09-09 DIAGNOSIS — G47.33 OSA (OBSTRUCTIVE SLEEP APNEA): ICD-10-CM

## 2024-09-09 NOTE — PROGRESS NOTES
I certify that I have examined the patient below and determined that Physical Medicine and Rehabilitation service is necessary; that the secondary diagnosis for the provision of rehabilitation services is consistent with identified needs; that service will be furnished on an outpatient basis while the patient is in my care; that I approve the above plan of care for up to 90 days or as specifically noted above and will review it within that time frame or more often if the patients condition requires. Attestation, signature or co-signature of physician indicates approval of certification requirements.    ________________________ ____________ __________  Physician Signature   Date   Time       Donny Jayealfredito     Time In: 0930  Time Out: 1020  Minutes: 50  Timed Code Treatment Minutes: 10 Minutes     Date: 2018  Patient Name: Rachael Navarro,  Gender:  male        CSN: [id number]   : 1970  (52 y.o.)        Referring Practitioner: Marguarite Najjar, DO      Diagnosis: M51.36 (ICD-10-CM) - Other intervertebral disc degeneration, lumbar region, M54.16 (ICD-10-CM) - Radiculopathy, lumbar region  Treatment Diagnosis: Lumbago, Difficulty with balance  Additional Pertinent Hx: Patient reports he needs to have nerve release in feet, neuropathy, Diabetes       General:  PT Visit Information  Onset Date: 18  PT Insurance Information: Medicare-PT/ST $3700 combined cap. Aquatics and modalities are covered except ionto and hot/cold packs. Total # of Visits to Date: 1  Plan of Care/Certification Expiration Date: 18  Progress Note Counter: 1/10 for PN                        See Medical History Questionnaire for information related to allergies and medications. Subjective:  Comments: Returns to Dr. Nelson Persons the middle of March.      Subjective: Patient reports he has been having pain in feet for several years, but in no change. Exercise 3: Switched to unloading in left sidelying : no change. Therapist offered heat but patient declined reporting he just wanted to go home. Exercise 4: Next session: modalities as needed  Exercise 5: Next session: abd bracing, pelvic tilt, bracing with alt UE/LE  Exercise 6: Next session: SKTC, supine hamstring                     TINETTI BALANCE TEST    BALANCE Patient is seated in a hard, armless chair   1 SITTING BALANCE 1 - Steady, safe   2. RISES FROM CHAIR 2 - Able without use of arms   3. ATTEMPTS TO RISE 2 - Able to rise, 1 attempt   4. IMMEDIATE STANDING BALANCE (FIRST 5 SECONDS) 2 - Steady without walker or other support   5. STANDING BALANCE 1 - Steady but wide stance and uses support   6. NUDGED 1 - Staggers, grabs, catches   7. EYES CLOSED 0 - Unsteady   8. TURNING 360 DEGREES 1 - Continuous and 1 - Steady   9. SITTING DOWN 1 - Uses arms or not a smooth motion   BALANCE SCORE 11/16       GAIT SECTION Patient stands with therapist, walks across room (+/- aids), fist at usual pace, then at rapid pace. 1.  INDICATION OF GAIT (Immediately after told to \"go\" 1 - No hesitancy   2. STEP LENGTH AND HEIGHT 1 - Step through Right and 1 - Step through Left   3. FOOT CLEARANCE 1 - Left foot clears floor and 1 - Right foot clears floor   4. STEP SYMMETRY 1 - Right and left step length appear equal   5. STEP CONTINUITY 1 - Steps appear continuous   6. PATH 1 - Mild/moderate deviation or uses walking aid   7. TRUNK 1 - No sway but flexes knees or back or uses arms for stability   8. WALKING TIME 0 - Heels apart   GAIT SCORE 9/12   TOTAL SCORE 20/28   Risk Indicators:  Less than/equal to 18 = high risk  19-23 Moderate risk  Greater than/equal to 24 = low risk                   Activity Tolerance:  Activity Tolerance: Patient Tolerated treatment well    Assessment:   Body structures, Functions, Activity limitations: Decreased functional mobility , Decreased ROM, Decreased ADL status, Decreased strength, Decreased sensation, Decreased endurance  Prognosis: Good  REQUIRES PT FOLLOW UP: Yes  Discharge Recommendations: Continue to assess pending progress    Patient Education:  Patient Education: Patient educated on POC                   Plan:  Times per week: 2 times per week  Plan weeks: 8 weeks  Specific instructions for Next Treatment: Modalities as needed, core and LE stretches and strengthening, posture and body mechanics, balance  Current Treatment Recommendations: Strengthening, ROM, Balance Training, Transfer Training, Functional Mobility Training, Endurance Training, Stair training, ADL/Self-care Training, Gait Training, Positioning, Pain Management, Modalities, Manual Therapy - Soft Tissue Mobilization, Home Exercise Program  Plan Comment: POC initiated today    History: Personal factors or comorbidities that impact plan of care -  Moderate Complexity: 1-2 personal factors or comorbidities. See history section above for details. Examination: Body structures and functions, activity limitations, participation restrictions; using standardized tests and measures - Moderate Complexity: 3 or morebody structures and functional, activity limitations and/or participation restrictions. See restrictions and objective section above for details. Clinical Presentation: Moderate - Evolving with Changing Characteristics: has radicular symptoms in feet    Decision Making: Moderate Complexity due to Patient initially came into session reporting he did not have a back problem. However, was able to flare up back easily with minimal movement in session. Patient is focused on pain in his feet. .  Decision making was based on patient assessment and decision making process in determining plan of care and establishing reasonable expectations for measurable functional outcomes.     Evaluation Complexity: Based on the findings of patient history, examination, clinical presentation, and decision making during this evaluation, the evaluation of Chevy Poag  is of medium complexity. Goals:  Patient goals : \"Lower my pain\"    Short term goals  Time Frame for Short term goals: 4 weeks  Short term goal 1: Improve Tinetti to 23/28 to assist with preventing falls. Short term goal 2: Decrease pain in feet to 6/10 to assist with ambulation. Short term goal 3: Patient able to stand for 15 minutes before having increased back pain to assist with standing in line at grocery store. Short term goal 4: Increase core and hip strength to 4+/5 to assist with carrying groceries. Short term goal 5: Improve posture needing no more than 2 cues for correction to assist with preventing injury. Long term goals  Time Frame for Long term goals : 8 weeks  Long term goal 1: Independent with HEP and with progression to assist with decreasing pain. PT G-Codes  Functional Assessment Tool Used: Tinetti  Score: 20/28  Functional Limitation: Mobility: Walking and moving around  Mobility: Walking and Moving Around Current Status (): At least 20 percent but less than 40 percent impaired, limited or restricted  Mobility: Walking and Moving Around Goal Status ():  At least 1 percent but less than 20 percent impaired, limited or restricted    Shiv Hernandez [] : no palpable masses [Rotation to left] : rotation to left [3___] : right finger abductors 3[unfilled]/5 [FreeTextEntry8] : very tender to palpation over midline and paraspinal muscles [de-identified] : left lateral rotation 45 degrees [TWNoteComboBox6] : right lateral rotation 45 degrees

## 2024-09-09 NOTE — PROGRESS NOTES
1970    Chief Complaint   Patient presents with    Follow-Up from Hospital     BP dropping       Pt is a 54 y.o. male with a pmHx as noted below who presents for a hospital follow up visit. He is a current patient of Dr. Kesha Hutson. He also sees Dr. Hackett for CKD. Patient presented as a follow up for a recent hospitalization on 8/27 at [facility] for low pressure. Patient reports dizziness, low blood pressure and falls all began after undergoing surgery on August 9th at [facility] for esophageal nodule removal. Patient endorses systolic BP has been in the 50s to 60s; reports falling 7 times before most recent hospitalization. Patient currently endorsing no c/p or SOB. Patient reports no BP issues prior to esophageal procedure. Patient was on Cialis, lisinopril and metoprolol prior to 8/27 hospitalization; Cialis currently held, lisinopril and metoprolol dosages were cut in half but patient endorses not taking them at all for the past 10 days. Reprots blood pressure have improved but still endorsing less frequent bouts of light headedness; reports getting worse after standing. Patient has tilt-table test scheduled at [facility] for this coming Wednesday (9/11). Reports no fever, constipation, diarrhea, HA, numbness/tingling. Reports abdominal pain, light-headedness.    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 Jordan (due to enlarged spleen)    Anesthesia     seizures with brain surgery due to blood sugar got really high    Bile reflux gastritis     per EGD 11/16/21 - Dr. Gregorio - to start Carafate.    Bipolar disorder (HCC)     Cancer (HCC)     Charcot's joint of foot, left     Chest pain     previously seeing [facility] cardiologist, now seeing Dr. Valdez (LAD bridging on cath 4/2016), no CAD per [facility] 6/2022.    Chronic back pain     Chronic bronchitis

## 2024-09-18 ENCOUNTER — TELEPHONE (OUTPATIENT)
Dept: INTERNAL MEDICINE CLINIC | Age: 54
End: 2024-09-18

## 2024-09-19 ENCOUNTER — TELEPHONE (OUTPATIENT)
Dept: INTERNAL MEDICINE CLINIC | Age: 54
End: 2024-09-19

## 2024-09-19 ENCOUNTER — HOSPITAL ENCOUNTER (OUTPATIENT)
Age: 54
Discharge: HOME | End: 2024-09-19
Payer: MEDICARE

## 2024-09-19 ENCOUNTER — OFFICE VISIT (OUTPATIENT)
Dept: INTERNAL MEDICINE CLINIC | Age: 54
End: 2024-09-19

## 2024-09-19 VITALS
HEIGHT: 75 IN | WEIGHT: 281.4 LBS | OXYGEN SATURATION: 95 % | SYSTOLIC BLOOD PRESSURE: 128 MMHG | BODY MASS INDEX: 34.99 KG/M2 | HEART RATE: 92 BPM | DIASTOLIC BLOOD PRESSURE: 82 MMHG

## 2024-09-19 DIAGNOSIS — N50.811 RIGHT TESTICULAR PAIN: Primary | ICD-10-CM

## 2024-09-19 DIAGNOSIS — R10.9 RIGHT FLANK PAIN: ICD-10-CM

## 2024-09-19 DIAGNOSIS — R35.0 URINARY FREQUENCY: ICD-10-CM

## 2024-09-19 DIAGNOSIS — R10.31 RIGHT GROIN PAIN: ICD-10-CM

## 2024-09-19 LAB
ANION GAP SERPL CALC-SCNC: 17 MEQ/L (ref 8–16)
BACTERIA URNS QL MICRO: ABNORMAL /HPF
BASOPHILS ABSOLUTE: 0 THOU/MM3 (ref 0–0.1)
BASOPHILS NFR BLD AUTO: 0.6 %
BILIRUB UR QL STRIP.AUTO: NEGATIVE
BUN SERPL-MCNC: 18 MG/DL (ref 7–22)
CALCIUM SERPL-MCNC: 9.1 MG/DL (ref 8.5–10.5)
CASTS #/AREA URNS LPF: ABNORMAL /LPF
CASTS 2: ABNORMAL /LPF
CHARACTER UR: CLEAR
CHLORIDE SERPL-SCNC: 92 MEQ/L (ref 98–111)
CO2 SERPL-SCNC: 23 MEQ/L (ref 23–33)
COLOR, UA: YELLOW
CREAT SERPL-MCNC: 1.1 MG/DL (ref 0.4–1.2)
CRYSTALS URNS MICRO: ABNORMAL
DEPRECATED RDW RBC AUTO: 39.5 FL (ref 35–45)
EOSINOPHIL NFR BLD AUTO: 3 %
EOSINOPHILS ABSOLUTE: 0.2 THOU/MM3 (ref 0–0.4)
EPITHELIAL CELLS, UA: ABNORMAL /HPF
ERYTHROCYTE [DISTWIDTH] IN BLOOD BY AUTOMATED COUNT: 12.8 % (ref 11.5–14.5)
GFR SERPL CREATININE-BSD FRML MDRD: 80 ML/MIN/1.73M2
GLUCOSE SERPL-MCNC: 567 MG/DL (ref 70–108)
GLUCOSE UR QL STRIP.AUTO: >= 1000 MG/DL
HCT VFR BLD AUTO: 47 % (ref 42–52)
HGB BLD-MCNC: 16.9 GM/DL (ref 14–18)
HGB UR QL STRIP.AUTO: NEGATIVE
IMM GRANULOCYTES # BLD AUTO: 0.04 THOU/MM3 (ref 0–0.07)
IMM GRANULOCYTES NFR BLD AUTO: 0.6 %
KETONES UR QL STRIP.AUTO: NEGATIVE
LYMPHOCYTES ABSOLUTE: 1.6 THOU/MM3 (ref 1–4.8)
LYMPHOCYTES NFR BLD AUTO: 23.7 %
MCH RBC QN AUTO: 31.1 PG (ref 26–33)
MCHC RBC AUTO-ENTMCNC: 36 GM/DL (ref 32.2–35.5)
MCV RBC AUTO: 86.6 FL (ref 80–94)
MISCELLANEOUS 2: ABNORMAL
MONOCYTES ABSOLUTE: 0.5 THOU/MM3 (ref 0.4–1.3)
MONOCYTES NFR BLD AUTO: 7.1 %
NEUTROPHILS ABSOLUTE: 4.4 THOU/MM3 (ref 1.8–7.7)
NEUTROPHILS NFR BLD AUTO: 65 %
NITRITE UR QL STRIP: NEGATIVE
NRBC BLD AUTO-RTO: 0 /100 WBC
PH UR STRIP.AUTO: 5.5 [PH] (ref 5–9)
PLATELET # BLD AUTO: 200 THOU/MM3 (ref 130–400)
PMV BLD AUTO: 11 FL (ref 9.4–12.4)
POTASSIUM SERPL-SCNC: 4.6 MEQ/L (ref 3.5–5.2)
PROT UR STRIP.AUTO-MCNC: 30 MG/DL
RBC # BLD AUTO: 5.43 MILL/MM3 (ref 4.7–6.1)
RBC URINE: ABNORMAL /HPF
RENAL EPI CELLS #/AREA URNS HPF: ABNORMAL /[HPF]
SODIUM SERPL-SCNC: 132 MEQ/L (ref 135–145)
SP GR UR REFRACT.AUTO: > 1.03 (ref 1–1.03)
UROBILINOGEN, URINE: 0.2 EU/DL (ref 0–1)
WBC # BLD AUTO: 6.8 THOU/MM3 (ref 4.8–10.8)
WBC #/AREA URNS HPF: ABNORMAL /HPF
WBC #/AREA URNS HPF: NEGATIVE /[HPF]
YEAST LIKE FUNGI URNS QL MICRO: ABNORMAL

## 2024-09-19 PROCEDURE — 80048 BASIC METABOLIC PNL TOTAL CA: CPT

## 2024-09-19 PROCEDURE — 36415 COLL VENOUS BLD VENIPUNCTURE: CPT

## 2024-09-19 PROCEDURE — 81001 URINALYSIS AUTO W/SCOPE: CPT

## 2024-09-19 PROCEDURE — 85025 COMPLETE CBC W/AUTO DIFF WBC: CPT

## 2024-09-19 PROCEDURE — 87591 N.GONORRHOEAE DNA AMP PROB: CPT

## 2024-09-19 PROCEDURE — 87491 CHLMYD TRACH DNA AMP PROBE: CPT

## 2024-09-19 RX ORDER — SULFAMETHOXAZOLE AND TRIMETHOPRIM 800; 160 MG/1; MG/1
1 TABLET ORAL 2 TIMES DAILY
Qty: 28 TABLET | Refills: 0 | Status: SHIPPED | OUTPATIENT
Start: 2024-09-19 | End: 2024-10-03

## 2024-09-19 NOTE — PROGRESS NOTES
1970    Chief Complaint   Patient presents with    Flank Pain    Groin Pain       Pt is a 54 y.o. male who presents for an urgent visit.    HPI    Pain right groin into hip started on Thurday - now just has groin pain and right flank pain.  He has history of kidney stones.  No F/C, positive sweats, weight loss- down 15# in 3 weeks - but due to not eating well after hypopharyngeal mass removal.  When I examined the patient - he actually has right testicular pain, not groin pain.    Episodic hypotension - seeing Dr. Zapata.  Neg tilt table at [facility] reportedly.  ECHO 8/2024 EF 55%.  Known LAD bridging.    Consider autonomic dysregulation? From uncontrolled DM.  Only noticing it with standing up.  He tried compression hose on legs x a week and still had low BP drops - still down to 70/40 - falls but caught himself.  He is drinking 8 glasses of water a day.  He was in [facility] for work up 8-27 - 8-29.  They cut back BP meds.  He since has stopped all his BP meds.    [facility] discharge summary - better with IV fluids.  He did not start midodrine.   Home BP meter accurate at resident visit last time.     Nerve stimulator now no longer working - severe neuropathy pain.     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 Jordan (due to enlarged spleen)    Anesthesia     seizures with brain surgery due to blood sugar got really high    Bile reflux gastritis     per EGD 11/16/21 - Dr. Gregorio - to start Carafate.    Bipolar disorder (HCC)     Cancer (HCC)     Charcot's joint of foot, left     Chest pain     previously seeing [facility] cardiologist, now seeing Dr. Valdez (LAD bridging on cath 4/2016), no CAD per [facility] 6/2022.    Chronic back pain     Chronic bronchitis (HCC)     Dr. Rocha    Colon polyp 08/30/2016    Depression     seeing Gulshan    Esophageal abnormality     nodule     Fatty liver

## 2024-09-20 DIAGNOSIS — R35.0 URINARY FREQUENCY: Primary | ICD-10-CM

## 2024-09-20 DIAGNOSIS — N50.811 RIGHT TESTICULAR PAIN: ICD-10-CM

## 2024-09-20 DIAGNOSIS — R10.9 RIGHT FLANK PAIN: ICD-10-CM

## 2024-09-20 DIAGNOSIS — R10.31 RIGHT GROIN PAIN: ICD-10-CM

## 2024-09-20 LAB — CHLAMYDIA, GC DNA AMP, URINE: NORMAL

## 2024-09-23 LAB
CHLAMYDIA DNA UR QL NAA+PROBE: NEGATIVE
CHLAMYDIA, GC DNA AMP, URINE: NORMAL
N GONORRHOEA DNA UR QL NAA+PROBE: NEGATIVE

## 2024-09-24 ENCOUNTER — HOSPITAL ENCOUNTER (OUTPATIENT)
Dept: ULTRASOUND IMAGING | Age: 54
Discharge: HOME | End: 2024-09-24
Attending: INTERNAL MEDICINE
Payer: MEDICARE

## 2024-09-24 DIAGNOSIS — R10.9 RIGHT FLANK PAIN: ICD-10-CM

## 2024-09-24 DIAGNOSIS — R35.0 URINARY FREQUENCY: ICD-10-CM

## 2024-09-24 DIAGNOSIS — N50.811 RIGHT TESTICULAR PAIN: ICD-10-CM

## 2024-09-24 DIAGNOSIS — R10.31 RIGHT GROIN PAIN: ICD-10-CM

## 2024-09-24 PROCEDURE — 76870 US EXAM SCROTUM: CPT

## 2024-09-24 PROCEDURE — 76770 US EXAM ABDO BACK WALL COMP: CPT

## 2024-09-26 ENCOUNTER — OFFICE VISIT (OUTPATIENT)
Dept: INTERNAL MEDICINE CLINIC | Age: 54
End: 2024-09-26

## 2024-09-26 VITALS
TEMPERATURE: 98.2 F | BODY MASS INDEX: 36.8 KG/M2 | HEART RATE: 92 BPM | SYSTOLIC BLOOD PRESSURE: 128 MMHG | DIASTOLIC BLOOD PRESSURE: 80 MMHG | WEIGHT: 296 LBS | HEIGHT: 75 IN

## 2024-09-26 DIAGNOSIS — K21.9 GASTROESOPHAGEAL REFLUX DISEASE WITHOUT ESOPHAGITIS: ICD-10-CM

## 2024-09-26 DIAGNOSIS — E11.40 TYPE 2 DIABETES MELLITUS WITH DIABETIC NEUROPATHY, WITH LONG-TERM CURRENT USE OF INSULIN (HCC): ICD-10-CM

## 2024-09-26 DIAGNOSIS — Z79.4 TYPE 2 DIABETES MELLITUS WITH DIABETIC NEUROPATHY, WITH LONG-TERM CURRENT USE OF INSULIN (HCC): ICD-10-CM

## 2024-09-26 DIAGNOSIS — Z00.00 INITIAL MEDICARE ANNUAL WELLNESS VISIT: Primary | ICD-10-CM

## 2024-09-26 RX ORDER — LACOSAMIDE 200 MG/1
200 TABLET ORAL 2 TIMES DAILY
COMMUNITY

## 2024-09-26 RX ORDER — DAPAGLIFLOZIN 10 MG/1
10 TABLET, FILM COATED ORAL EVERY MORNING
Qty: 90 TABLET | Refills: 1 | Status: SHIPPED | OUTPATIENT
Start: 2024-09-26

## 2024-09-26 RX ORDER — PANTOPRAZOLE SODIUM 40 MG/1
40 TABLET, DELAYED RELEASE ORAL 2 TIMES DAILY
Qty: 180 TABLET | Refills: 1 | Status: SHIPPED | OUTPATIENT
Start: 2024-09-26

## 2024-09-26 ASSESSMENT — COLUMBIA-SUICIDE SEVERITY RATING SCALE - C-SSRS
6. HAVE YOU EVER DONE ANYTHING, STARTED TO DO ANYTHING, OR PREPARED TO DO ANYTHING TO END YOUR LIFE?: NO
1. WITHIN THE PAST MONTH, HAVE YOU WISHED YOU WERE DEAD OR WISHED YOU COULD GO TO SLEEP AND NOT WAKE UP?: NO
2. HAVE YOU ACTUALLY HAD ANY THOUGHTS OF KILLING YOURSELF?: NO

## 2024-09-26 ASSESSMENT — PATIENT HEALTH QUESTIONNAIRE - PHQ9
SUM OF ALL RESPONSES TO PHQ QUESTIONS 1-9: 7
3. TROUBLE FALLING OR STAYING ASLEEP: NOT AT ALL
6. FEELING BAD ABOUT YOURSELF - OR THAT YOU ARE A FAILURE OR HAVE LET YOURSELF OR YOUR FAMILY DOWN: NOT AT ALL
6. FEELING BAD ABOUT YOURSELF - OR THAT YOU ARE A FAILURE OR HAVE LET YOURSELF OR YOUR FAMILY DOWN: NOT AT ALL
3. TROUBLE FALLING OR STAYING ASLEEP: SEVERAL DAYS
SUM OF ALL RESPONSES TO PHQ9 QUESTIONS 1 & 2: 4
3. TROUBLE FALLING OR STAYING ASLEEP: SEVERAL DAYS
SUM OF ALL RESPONSES TO PHQ QUESTIONS 1-9: 7
9. THOUGHTS THAT YOU WOULD BE BETTER OFF DEAD, OR OF HURTING YOURSELF: NOT AT ALL
9. THOUGHTS THAT YOU WOULD BE BETTER OFF DEAD, OR OF HURTING YOURSELF: NOT AT ALL
7. TROUBLE CONCENTRATING ON THINGS, SUCH AS READING THE NEWSPAPER OR WATCHING TELEVISION: SEVERAL DAYS
SUM OF ALL RESPONSES TO PHQ QUESTIONS 1-9: 7
4. FEELING TIRED OR HAVING LITTLE ENERGY: NEARLY EVERY DAY
10. IF YOU CHECKED OFF ANY PROBLEMS, HOW DIFFICULT HAVE THESE PROBLEMS MADE IT FOR YOU TO DO YOUR WORK, TAKE CARE OF THINGS AT HOME, OR GET ALONG WITH OTHER PEOPLE: SOMEWHAT DIFFICULT
SUM OF ALL RESPONSES TO PHQ QUESTIONS 1-9: 9
8. MOVING OR SPEAKING SO SLOWLY THAT OTHER PEOPLE COULD HAVE NOTICED. OR THE OPPOSITE, BEING SO FIGETY OR RESTLESS THAT YOU HAVE BEEN MOVING AROUND A LOT MORE THAN USUAL: NOT AT ALL
5. POOR APPETITE OR OVEREATING: NOT AT ALL
SUM OF ALL RESPONSES TO PHQ QUESTIONS 1-9: 9
SUM OF ALL RESPONSES TO PHQ QUESTIONS 1-9: 9
SUM OF ALL RESPONSES TO PHQ QUESTIONS 1-9: 7
SUM OF ALL RESPONSES TO PHQ QUESTIONS 1-9: 7
SUM OF ALL RESPONSES TO PHQ QUESTIONS 1-9: 9
2. FEELING DOWN, DEPRESSED OR HOPELESS: NEARLY EVERY DAY
2. FEELING DOWN, DEPRESSED OR HOPELESS: SEVERAL DAYS
2. FEELING DOWN, DEPRESSED OR HOPELESS: NEARLY EVERY DAY
5. POOR APPETITE OR OVEREATING: SEVERAL DAYS
1. LITTLE INTEREST OR PLEASURE IN DOING THINGS: NEARLY EVERY DAY
4. FEELING TIRED OR HAVING LITTLE ENERGY: NEARLY EVERY DAY
SUM OF ALL RESPONSES TO PHQ QUESTIONS 1-9: 7
6. FEELING BAD ABOUT YOURSELF - OR THAT YOU ARE A FAILURE OR HAVE LET YOURSELF OR YOUR FAMILY DOWN: NOT AT ALL
9. THOUGHTS THAT YOU WOULD BE BETTER OFF DEAD, OR OF HURTING YOURSELF: NOT AT ALL
SUM OF ALL RESPONSES TO PHQ QUESTIONS 1-9: 7
SUM OF ALL RESPONSES TO PHQ QUESTIONS 1-9: 7
7. TROUBLE CONCENTRATING ON THINGS, SUCH AS READING THE NEWSPAPER OR WATCHING TELEVISION: NOT AT ALL
8. MOVING OR SPEAKING SO SLOWLY THAT OTHER PEOPLE COULD HAVE NOTICED. OR THE OPPOSITE, BEING SO FIGETY OR RESTLESS THAT YOU HAVE BEEN MOVING AROUND A LOT MORE THAN USUAL: NOT AT ALL
5. POOR APPETITE OR OVEREATING: NOT AT ALL
10. IF YOU CHECKED OFF ANY PROBLEMS, HOW DIFFICULT HAVE THESE PROBLEMS MADE IT FOR YOU TO DO YOUR WORK, TAKE CARE OF THINGS AT HOME, OR GET ALONG WITH OTHER PEOPLE: SOMEWHAT DIFFICULT
10. IF YOU CHECKED OFF ANY PROBLEMS, HOW DIFFICULT HAVE THESE PROBLEMS MADE IT FOR YOU TO DO YOUR WORK, TAKE CARE OF THINGS AT HOME, OR GET ALONG WITH OTHER PEOPLE: SOMEWHAT DIFFICULT
4. FEELING TIRED OR HAVING LITTLE ENERGY: NEARLY EVERY DAY
7. TROUBLE CONCENTRATING ON THINGS, SUCH AS READING THE NEWSPAPER OR WATCHING TELEVISION: NOT AT ALL
8. MOVING OR SPEAKING SO SLOWLY THAT OTHER PEOPLE COULD HAVE NOTICED. OR THE OPPOSITE, BEING SO FIGETY OR RESTLESS THAT YOU HAVE BEEN MOVING AROUND A LOT MORE THAN USUAL: NOT AT ALL

## 2024-09-26 ASSESSMENT — LIFESTYLE VARIABLES
HOW OFTEN DO YOU HAVE A DRINK CONTAINING ALCOHOL: NEVER
HOW MANY STANDARD DRINKS CONTAINING ALCOHOL DO YOU HAVE ON A TYPICAL DAY: PATIENT DOES NOT DRINK

## 2024-09-26 NOTE — PATIENT INSTRUCTIONS
Bring in power of  for health care paperwork - please call me if you want me to set up new power of  if you can't find at home.     Preventing Falls: Care Instructions  Injuries and health problems such as trouble walking or poor eyesight can increase your risk of falling. So can some medicines. But there are things you can do to help prevent falls. You can exercise to get stronger. You can also arrange your home to make it safer.    Talk to your doctor about the medicines you take. Ask if any of them increase the risk of falls and whether they can be changed or stopped.   Try to exercise regularly. It can help improve your strength and balance. This can help lower your risk of falling.         Practice fall safety and prevention.   Wear low-heeled shoes that fit well and give your feet good support. Talk to your doctor if you have foot problems that make this hard.  Carry a cellphone or wear a medical alert device that you can use to call for help.  Use stepladders instead of chairs to reach high objects. Don't climb if you're at risk for falls. Ask for help, if needed.  Wear the correct eyeglasses, if you need them.        Make your home safer.   Remove rugs, cords, clutter, and furniture from walkways.  Keep your house well lit. Use night-lights in hallways and bathrooms.  Install and use sturdy handrails on stairways.  Wear nonskid footwear, even inside. Don't walk barefoot or in socks without shoes.        Be safe outside.   Use handrails, curb cuts, and ramps whenever possible.  Keep your hands free by using a shoulder bag or backpack.  Try to walk in well-lit areas. Watch out for uneven ground, changes in pavement, and debris.  Be careful in the winter. Walk on the grass or gravel when sidewalks are slippery. Use de-icer on steps and walkways. Add non-slip devices to shoes.    Put grab bars and nonskid mats in your shower or tub and near the toilet. Try to use a shower chair or bath bench when

## 2024-09-26 NOTE — PROGRESS NOTES
Medicare Annual Wellness Visit    Nicolas Mackenzie is here for Medicare AWV    Assessment & Plan   Initial Medicare annual wellness visit  Type 2 diabetes mellitus with diabetic neuropathy, with long-term current use of insulin (HCC)  -     dapagliflozin (FARXIGA) 10 MG tablet; Take 1 tablet by mouth every morning, Disp-90 tablet, R-1Normal  Gastroesophageal reflux disease without esophagitis  -     pantoprazole (PROTONIX) 40 MG tablet; Take 1 tablet by mouth 2 times daily, Disp-180 tablet, R-1Normal    Recommendations for Preventive Services Due: see orders and patient instructions/AVS.  Recommended screening schedule for the next 5-10 years is provided to the patient in written form: see Patient Instructions/AVS.    Refuses all vaccines.   He did colonoscopy 11/1/2022 with Dr. Gregorio and not due for 5 years - needs to be put in HM. Note for colonoscopy is in note section but for some reason not linking to HM section.  Sent note to Holly to fix.  Need to bring in POA or repeat it.  He wants [place] ex-wife and not children - 3 adult children but doesn't want them to make decisions.  I told him that until I have paperwork - kids collectively would be ones to make decision legally.  Falls:  Reviewed medications, home hazards, visual acuity, and co-morbidities that can increase risk for falls  Due to low BP - advised on sitting down if feeling lightheaded and not trying to push through it.  Cardiology and nephrology working on BP issues - better in the last one week.   Refilled chronic medication as above.    Right testicular pain not improving with antibiotic - told patient to follow up with his Urologist - office to assist in making appt.     Regular follow up appt 1/2025, set up yearly wellness.     Subjective       Patient's complete Health Risk Assessment and screening values have been reviewed and are found in Flowsheets. The following problems were reviewed today and where indicated follow up appointments were

## 2024-09-30 ENCOUNTER — TELEPHONE (OUTPATIENT)
Dept: INTERNAL MEDICINE CLINIC | Age: 54
End: 2024-09-30

## 2024-09-30 NOTE — TELEPHONE ENCOUNTER
----- Message from Dr. Kesha Hutson MD sent at 9/29/2024  3:11 PM EDT -----  Reviewed results at visit 9/26/24 - make sure our staff made appt with [facility] for testicular pain - he was known to them (I asked them to do this 9/26/24 visit)

## 2024-09-30 NOTE — TELEPHONE ENCOUNTER
----- Message from Dr. Kesha Hutson MD sent at 9/29/2024  3:12 PM EDT -----  Please call patient and let them know results were acceptable.

## 2024-10-07 ENCOUNTER — TELEPHONE (OUTPATIENT)
Dept: INTERNAL MEDICINE CLINIC | Age: 54
End: 2024-10-07

## 2024-10-07 ENCOUNTER — LAB (OUTPATIENT)
Dept: LAB | Age: 54
End: 2024-10-07

## 2024-10-07 DIAGNOSIS — E78.2 MIXED HYPERLIPIDEMIA: ICD-10-CM

## 2024-10-07 DIAGNOSIS — N50.811 RIGHT TESTICULAR PAIN: ICD-10-CM

## 2024-10-07 DIAGNOSIS — E11.40 TYPE 2 DIABETES MELLITUS WITH DIABETIC NEUROPATHY, WITH LONG-TERM CURRENT USE OF INSULIN (HCC): ICD-10-CM

## 2024-10-07 DIAGNOSIS — E72.20 HYPERAMMONEMIA (HCC): ICD-10-CM

## 2024-10-07 DIAGNOSIS — Z79.4 TYPE 2 DIABETES MELLITUS WITH DIABETIC NEUROPATHY, WITH LONG-TERM CURRENT USE OF INSULIN (HCC): ICD-10-CM

## 2024-10-07 DIAGNOSIS — R10.9 RIGHT FLANK PAIN: ICD-10-CM

## 2024-10-07 DIAGNOSIS — E55.9 VITAMIN D DEFICIENCY: ICD-10-CM

## 2024-10-07 DIAGNOSIS — I10 ESSENTIAL HYPERTENSION: ICD-10-CM

## 2024-10-07 DIAGNOSIS — R10.31 RIGHT GROIN PAIN: ICD-10-CM

## 2024-10-07 DIAGNOSIS — R35.0 URINARY FREQUENCY: ICD-10-CM

## 2024-10-07 LAB
25(OH)D3 SERPL-MCNC: 17 NG/ML (ref 30–100)
ALBUMIN SERPL BCG-MCNC: 4.1 G/DL (ref 3.5–5.1)
ALP SERPL-CCNC: 109 U/L (ref 38–126)
ALT SERPL W/O P-5'-P-CCNC: 30 U/L (ref 11–66)
AMMONIA PLAS-MCNC: 31 UMOL/L (ref 11–60)
ANION GAP SERPL CALC-SCNC: 12 MEQ/L (ref 8–16)
AST SERPL-CCNC: 21 U/L (ref 5–40)
BILIRUB SERPL-MCNC: 1.2 MG/DL (ref 0.3–1.2)
BUN SERPL-MCNC: 10 MG/DL (ref 7–22)
CALCIUM SERPL-MCNC: 9 MG/DL (ref 8.5–10.5)
CHLORIDE SERPL-SCNC: 102 MEQ/L (ref 98–111)
CHOLEST SERPL-MCNC: 163 MG/DL (ref 100–199)
CO2 SERPL-SCNC: 24 MEQ/L (ref 23–33)
CREAT SERPL-MCNC: 0.8 MG/DL (ref 0.4–1.2)
GFR SERPL CREATININE-BSD FRML MDRD: > 90 ML/MIN/1.73M2
GLUCOSE SERPL-MCNC: 425 MG/DL (ref 70–108)
HDLC SERPL-MCNC: 28 MG/DL
LDLC SERPL CALC-MCNC: ABNORMAL MG/DL
POTASSIUM SERPL-SCNC: 4 MEQ/L (ref 3.5–5.2)
PROT SERPL-MCNC: 6.9 G/DL (ref 6.1–8)
SODIUM SERPL-SCNC: 138 MEQ/L (ref 135–145)
TRIGL SERPL-MCNC: 410 MG/DL (ref 0–199)

## 2024-10-09 NOTE — TELEPHONE ENCOUNTER
Patient's vitamin D level was 17 . Psych wants patient to start vitamin D 5000 iu daily . Patient was asking if you think that will be enough . Patient states that he seen the urologist about the testicular pain and he said there was nothing wring in his end and to follow-up with PCP .

## 2024-10-10 ENCOUNTER — TELEPHONE (OUTPATIENT)
Dept: INTERNAL MEDICINE CLINIC | Age: 54
End: 2024-10-10

## 2024-10-10 DIAGNOSIS — E55.9 VITAMIN D DEFICIENCY: Primary | ICD-10-CM

## 2024-10-10 RX ORDER — ACYCLOVIR 400 MG/1
1 TABLET ORAL
Qty: 9 EACH | Refills: 3 | Status: CANCELLED | OUTPATIENT
Start: 2024-10-10

## 2024-10-10 NOTE — TELEPHONE ENCOUNTER
Will send in script for 5000 IU daily - he just needs to be consistent with it.  In the past he has been on 50,000 IU weekly and still not high enough at time.  Not sure how consistent he was with it.  Maybe if daily he won't miss it.

## 2024-10-10 NOTE — TELEPHONE ENCOUNTER
Hector is requesting a refill on his Dexcom G7 sensors  He wishes to have the Rx printed so that he can send it to [facility] to be filled.    Print script and let Hector know it is available.

## 2024-10-14 ENCOUNTER — TELEPHONE (OUTPATIENT)
Dept: INTERNAL MEDICINE CLINIC | Age: 54
End: 2024-10-14

## 2024-10-14 DIAGNOSIS — E78.2 MIXED HYPERLIPIDEMIA: Primary | ICD-10-CM

## 2024-10-14 DIAGNOSIS — Z79.4 TYPE 2 DIABETES MELLITUS WITH DIABETIC NEUROPATHY, WITH LONG-TERM CURRENT USE OF INSULIN (HCC): Primary | ICD-10-CM

## 2024-10-14 DIAGNOSIS — E11.40 TYPE 2 DIABETES MELLITUS WITH DIABETIC NEUROPATHY, WITH LONG-TERM CURRENT USE OF INSULIN (HCC): Primary | ICD-10-CM

## 2024-10-14 RX ORDER — ACYCLOVIR 400 MG/1
TABLET ORAL
Qty: 9 EACH | Refills: 3 | Status: SHIPPED | OUTPATIENT
Start: 2024-10-14

## 2024-10-14 NOTE — TELEPHONE ENCOUNTER
----- Message from Dr. Kesha Hutson MD sent at 10/13/2024  8:00 PM EDT -----  Please call lab and ask them to add a direct LDL to blood in lab.

## 2024-10-26 PROBLEM — M25.462 EFFUSION OF LEFT KNEE: Status: RESOLVED | Noted: 2020-09-21 | Resolved: 2024-10-26

## 2024-10-26 PROBLEM — Z79.4 TYPE 2 DIABETES MELLITUS WITHOUT COMPLICATION, WITH LONG-TERM CURRENT USE OF INSULIN (HCC): Status: RESOLVED | Noted: 2024-03-22 | Resolved: 2024-10-26

## 2024-10-26 PROBLEM — G40.919 BREAKTHROUGH SEIZURE (HCC): Status: RESOLVED | Noted: 2024-06-09 | Resolved: 2024-10-26

## 2024-10-26 PROBLEM — Z79.4 TYPE 2 DIABETES MELLITUS WITH BOTH EYES AFFECTED BY MILD NONPROLIFERATIVE RETINOPATHY WITHOUT MACULAR EDEMA, WITH LONG-TERM CURRENT USE OF INSULIN (HCC): Status: ACTIVE | Noted: 2024-10-26

## 2024-10-26 PROBLEM — G40.909 SEIZURE DISORDER (HCC): Status: RESOLVED | Noted: 2021-11-12 | Resolved: 2024-10-26

## 2024-10-26 PROBLEM — E11.3293 TYPE 2 DIABETES MELLITUS WITH BOTH EYES AFFECTED BY MILD NONPROLIFERATIVE RETINOPATHY WITHOUT MACULAR EDEMA, WITH LONG-TERM CURRENT USE OF INSULIN (HCC): Status: ACTIVE | Noted: 2024-10-26

## 2024-10-26 PROBLEM — E66.811 OBESITY (BMI 30.0-34.9): Status: RESOLVED | Noted: 2017-01-21 | Resolved: 2024-10-26

## 2024-10-26 PROBLEM — Z86.14 MRSA INFECTION WITHIN LAST 3 MONTHS: Status: RESOLVED | Noted: 2021-11-10 | Resolved: 2024-10-26

## 2024-10-26 PROBLEM — E11.9 TYPE 2 DIABETES MELLITUS WITHOUT COMPLICATION, WITH LONG-TERM CURRENT USE OF INSULIN (HCC): Status: RESOLVED | Noted: 2024-03-22 | Resolved: 2024-10-26

## 2024-10-28 ENCOUNTER — LAB (OUTPATIENT)
Dept: LAB | Age: 54
End: 2024-10-28

## 2024-10-28 DIAGNOSIS — E78.2 MIXED HYPERLIPIDEMIA: ICD-10-CM

## 2024-10-28 LAB — LDLC SERPL DIRECT ASSAY-MCNC: 71.7 MG/DL

## 2024-11-01 DIAGNOSIS — M62.838 MUSCLE SPASM: Primary | ICD-10-CM

## 2024-11-01 DIAGNOSIS — E86.0 DEHYDRATION: ICD-10-CM

## 2024-11-05 ENCOUNTER — TELEPHONE (OUTPATIENT)
Dept: INTERNAL MEDICINE CLINIC | Age: 54
End: 2024-11-05

## 2024-11-05 NOTE — TELEPHONE ENCOUNTER
See [employer]t message 10/17 ., patient refused to schedule appointment , patient states that he is fine .

## 2024-11-06 ENCOUNTER — TELEPHONE (OUTPATIENT)
Dept: INTERNAL MEDICINE CLINIC | Age: 54
End: 2024-11-06

## 2024-11-06 NOTE — TELEPHONE ENCOUNTER
Patient called stating that he use to take the Gabapentin four times a day but a doctor in [place] that he no longer sees decrease it to three times a day . Patient seen Dr. Zabala today and he suggested that he call and see if the Gabapentin 600 mg could be increased back up to four times a day for the neuropathy in his feet . Please advise .

## 2024-11-07 ENCOUNTER — TELEPHONE (OUTPATIENT)
Dept: INTERNAL MEDICINE CLINIC | Age: 54
End: 2024-11-07

## 2024-11-07 NOTE — TELEPHONE ENCOUNTER
Spoke with patient and he has small dime size area on lower leg , red , painful . He has has it for about 3 days . Patient's wife felt he should call for an antibiotic . Patient has refused appointments the last couple of times he has called with complaints . ( [facility] pharmacy , allergic to Cipro )

## 2024-11-07 NOTE — TELEPHONE ENCOUNTER
Patient called and left message that he has a sore on his lower leg and was asking for an antibiotic . Called back to get more information and no answer .

## 2024-11-09 RX ORDER — SULFAMETHOXAZOLE AND TRIMETHOPRIM 800; 160 MG/1; MG/1
1 TABLET ORAL 2 TIMES DAILY
Qty: 20 TABLET | Refills: 0 | Status: SHIPPED | OUTPATIENT
Start: 2024-11-09 | End: 2024-11-19

## 2024-11-10 NOTE — TELEPHONE ENCOUNTER
I thought we discussed this at a previous visit and his nephrologist didn't want higher dose with his renal issues - granted his eGFR is now doing great but fluctuates sometimes.  So put call in to [facility] nephrology - ask if they are ok with increasing dose or if they have any limits with regard to Neurontin dose.

## 2024-11-10 NOTE — TELEPHONE ENCOUNTER
Called patient - asked him to send in photo of lesion on MyChart. No fever, chills, spreading redness, fluctuance.  No known bug bites. Ordered Bactrim DS x 10 days to cover MRSA as history of this - sent to [facility] as he requested.  He can't come in.  Told him to call if not improving.    He is to get BMP this coming week. Normal eGFR at last check.  Only allergy to antibiotic is Cipro, patient denied issues with Bactrim in past.

## 2024-11-11 NOTE — TELEPHONE ENCOUNTER
Spoke with patient and he will try sending a photo of the lesion . Will check with Nephrology on increasing the Gabapentin . Patient is getting labs tomorrow for Dr. Fish . Patient forgot to ask if he could get an x-ray of lower leg . The lesion is on the back of his calf but he feels like he is having pain in the bone about 5 inch up from his ankle in the front of his leg .

## 2024-11-19 ENCOUNTER — OFFICE VISIT (OUTPATIENT)
Dept: INTERNAL MEDICINE CLINIC | Age: 54
End: 2024-11-19

## 2024-11-19 VITALS
HEIGHT: 75 IN | SYSTOLIC BLOOD PRESSURE: 140 MMHG | BODY MASS INDEX: 37.23 KG/M2 | HEART RATE: 92 BPM | DIASTOLIC BLOOD PRESSURE: 78 MMHG | WEIGHT: 299.4 LBS | TEMPERATURE: 97.4 F

## 2024-11-19 DIAGNOSIS — M79.662 PAIN OF LEFT CALF: Primary | ICD-10-CM

## 2024-11-19 RX ORDER — ZOLPIDEM TARTRATE 5 MG/1
5 TABLET ORAL NIGHTLY
COMMUNITY
Start: 2024-11-06

## 2024-11-19 RX ORDER — OXYCODONE HYDROCHLORIDE 5 MG/1
5 TABLET ORAL EVERY 6 HOURS PRN
COMMUNITY
Start: 2024-11-12

## 2024-11-19 NOTE — PROGRESS NOTES
1970    Chief Complaint   Patient presents with    Leg Pain     Left        Pt is a 54 y.o. male who presents for an urgent visit.      HPI    Left posterior calf pain - feels like deep in bone.  Ongoing last several weeks.  He has had falls, so could be a fracture- has had hairline fractures in past.  He has had history of DVT in past - multiple but refuses anticoagulation long term due to cost of Eliquis/Xarelto and unable to be safely managed on Coumadin in past. Will check XR and venous doppler of left lower extremity.    To see nephrology today.  BP high normal but fluctuating - will hold all medication for now.     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 Jordan (due to enlarged spleen)    Anesthesia     seizures with brain surgery due to blood sugar got really high    Bile reflux gastritis     per EGD 11/16/21 - Dr. Gregorio - to start Carafate.    Bipolar disorder (HCC)     Cancer (HCC)     Charcot's joint of foot, left     Chest pain     previously seeing [facility] cardiologist, now seeing Dr. Valdez (LAD bridging on cath 4/2016), no CAD per [facility] 6/2022.    Chronic back pain     Chronic bronchitis (HCC)     Dr. Rocha    Colon polyp 08/30/2016    Depression     seeing Gulshan    Esophageal abnormality     nodule     Fatty liver disease, nonalcoholic     U/S 11/2013 [facility]    Follicular adenoma of thyroid gland 01/15/2021    s/p lobectomy    Furuncle     legs    History of blood transfusion     History of Doppler ultrasound 05/29/2011    No hemodynamically significant carotid stenosis is identified. A thyroid nodule on each side. Dedicated ultrasound of thyroid gland is suggested to further evaluate if clinically indicated.     History of pulmonary embolism 2007    s/p GFF, related to knee surgery    Hx of blood clots     leg and lung    Hyperammonemia (HCC)

## 2024-12-10 ENCOUNTER — TELEPHONE (OUTPATIENT)
Dept: INTERNAL MEDICINE CLINIC | Age: 54
End: 2024-12-10

## 2024-12-10 NOTE — TELEPHONE ENCOUNTER
Patient called stating the vitamin D 5000 units daily is costing him $35 and he was asking if he could go back to the Vitamin D 50,000 units once a week . The co-pay is much less and his vitamin D level is still low . Psychiatry wants him on vitamin D to help with his depression . Please advise .

## 2024-12-14 PROBLEM — R22.30 LUMP OF AXILLA: Status: RESOLVED | Noted: 2017-07-07 | Resolved: 2024-12-14

## 2024-12-14 PROBLEM — G93.40 ENCEPHALOPATHY: Status: RESOLVED | Noted: 2024-06-09 | Resolved: 2024-12-14

## 2024-12-14 PROBLEM — T14.8XXA NONHEALING NONSURGICAL WOUND: Status: RESOLVED | Noted: 2021-11-10 | Resolved: 2024-12-14

## 2024-12-14 PROBLEM — G57.50 TARSAL TUNNEL SYNDROME: Status: RESOLVED | Noted: 2022-03-19 | Resolved: 2024-12-14

## 2024-12-30 RX ORDER — GABAPENTIN 600 MG/1
600 TABLET ORAL 3 TIMES DAILY
Qty: 90 TABLET | Refills: 0 | Status: SHIPPED | OUTPATIENT
Start: 2024-12-30 | End: 2025-03-30

## 2024-12-30 RX ORDER — ROPINIROLE 1 MG/1
TABLET, FILM COATED ORAL
Qty: 135 TABLET | Refills: 0 | Status: SHIPPED | OUTPATIENT
Start: 2024-12-30

## 2025-01-02 RX ORDER — ERGOCALCIFEROL 1.25 MG/1
50000 CAPSULE, LIQUID FILLED ORAL WEEKLY
COMMUNITY
End: 2025-01-05

## 2025-01-02 NOTE — TELEPHONE ENCOUNTER
Called script to [facility] pharmacy , left prescription information on prescriber's voicemail .

## 2025-01-05 RX ORDER — ERGOCALCIFEROL 1.25 MG/1
50000 CAPSULE, LIQUID FILLED ORAL WEEKLY
Qty: 12 CAPSULE | Refills: 1 | OUTPATIENT
Start: 2025-01-05

## 2025-01-07 ENCOUNTER — OFFICE VISIT (OUTPATIENT)
Dept: INTERNAL MEDICINE CLINIC | Age: 55
End: 2025-01-07

## 2025-01-07 ENCOUNTER — TELEPHONE (OUTPATIENT)
Dept: INTERNAL MEDICINE CLINIC | Age: 55
End: 2025-01-07

## 2025-01-07 ENCOUNTER — LAB (OUTPATIENT)
Dept: LAB | Age: 55
End: 2025-01-07

## 2025-01-07 VITALS
BODY MASS INDEX: 37.15 KG/M2 | OXYGEN SATURATION: 94 % | HEART RATE: 79 BPM | HEIGHT: 75 IN | SYSTOLIC BLOOD PRESSURE: 142 MMHG | WEIGHT: 298.8 LBS | DIASTOLIC BLOOD PRESSURE: 80 MMHG

## 2025-01-07 DIAGNOSIS — E03.9 ACQUIRED HYPOTHYROIDISM: ICD-10-CM

## 2025-01-07 DIAGNOSIS — R56.9 SEIZURES (HCC): ICD-10-CM

## 2025-01-07 DIAGNOSIS — E83.42 HYPOMAGNESEMIA: ICD-10-CM

## 2025-01-07 DIAGNOSIS — E55.9 VITAMIN D DEFICIENCY: ICD-10-CM

## 2025-01-07 DIAGNOSIS — R60.0 BILATERAL LEG EDEMA: ICD-10-CM

## 2025-01-07 DIAGNOSIS — Z79.4 TYPE 2 DIABETES MELLITUS WITH DIABETIC NEUROPATHY, WITH LONG-TERM CURRENT USE OF INSULIN (HCC): ICD-10-CM

## 2025-01-07 DIAGNOSIS — E11.40 TYPE 2 DIABETES MELLITUS WITH DIABETIC NEUROPATHY, WITH LONG-TERM CURRENT USE OF INSULIN (HCC): Primary | ICD-10-CM

## 2025-01-07 DIAGNOSIS — I10 PRIMARY HYPERTENSION: ICD-10-CM

## 2025-01-07 DIAGNOSIS — E11.40 TYPE 2 DIABETES MELLITUS WITH DIABETIC NEUROPATHY, WITH LONG-TERM CURRENT USE OF INSULIN (HCC): ICD-10-CM

## 2025-01-07 DIAGNOSIS — M79.662 BILATERAL CALF PAIN: ICD-10-CM

## 2025-01-07 DIAGNOSIS — Z79.4 TYPE 2 DIABETES MELLITUS WITH DIABETIC NEUROPATHY, WITH LONG-TERM CURRENT USE OF INSULIN (HCC): Primary | ICD-10-CM

## 2025-01-07 DIAGNOSIS — M79.661 BILATERAL CALF PAIN: ICD-10-CM

## 2025-01-07 DIAGNOSIS — E78.2 MIXED HYPERLIPIDEMIA: ICD-10-CM

## 2025-01-07 DIAGNOSIS — R10.31 RIGHT GROIN PAIN: ICD-10-CM

## 2025-01-07 DIAGNOSIS — F31.30 BIPOLAR DISORDER, CURRENT EPISODE DEPRESSED, MILD OR MODERATE SEVERITY, UNSPECIFIED (HCC): ICD-10-CM

## 2025-01-07 DIAGNOSIS — R80.9 MICROALBUMINURIA: ICD-10-CM

## 2025-01-07 PROBLEM — G20.A1 PARKINSON'S DISEASE, UNSPECIFIED WHETHER DYSKINESIA PRESENT, UNSPECIFIED WHETHER MANIFESTATIONS FLUCTUATE (HCC): Status: ACTIVE | Noted: 2025-01-07

## 2025-01-07 LAB
ALBUMIN SERPL BCG-MCNC: 4.3 G/DL (ref 3.5–5.1)
ALP SERPL-CCNC: 115 U/L (ref 38–126)
ALT SERPL W/O P-5'-P-CCNC: 25 U/L (ref 11–66)
ANION GAP SERPL CALC-SCNC: 12 MEQ/L (ref 8–16)
AST SERPL-CCNC: 19 U/L (ref 5–40)
BILIRUB SERPL-MCNC: 1.1 MG/DL (ref 0.3–1.2)
BUN SERPL-MCNC: 12 MG/DL (ref 7–22)
CALCIUM SERPL-MCNC: 9.5 MG/DL (ref 8.5–10.5)
CHLORIDE SERPL-SCNC: 101 MEQ/L (ref 98–111)
CO2 SERPL-SCNC: 28 MEQ/L (ref 23–33)
CREAT SERPL-MCNC: 0.7 MG/DL (ref 0.4–1.2)
GFR SERPL CREATININE-BSD FRML MDRD: > 90 ML/MIN/1.73M2
GLUCOSE SERPL-MCNC: 219 MG/DL (ref 70–108)
HBA1C MFR BLD: 8.3 % (ref 4.3–5.7)
MAGNESIUM SERPL-MCNC: 1.6 MG/DL (ref 1.6–2.4)
POTASSIUM SERPL-SCNC: 4.3 MEQ/L (ref 3.5–5.2)
PROT SERPL-MCNC: 7.1 G/DL (ref 6.1–8)
SODIUM SERPL-SCNC: 141 MEQ/L (ref 135–145)
T4 FREE SERPL-MCNC: 1.11 NG/DL (ref 0.93–1.68)
TSH SERPL DL<=0.005 MIU/L-ACNC: 2.34 UIU/ML (ref 0.4–4.2)

## 2025-01-07 RX ORDER — METOPROLOL TARTRATE 25 MG/1
25 TABLET, FILM COATED ORAL 2 TIMES DAILY
COMMUNITY

## 2025-01-07 SDOH — ECONOMIC STABILITY: FOOD INSECURITY: WITHIN THE PAST 12 MONTHS, YOU WORRIED THAT YOUR FOOD WOULD RUN OUT BEFORE YOU GOT MONEY TO BUY MORE.: NEVER TRUE

## 2025-01-07 SDOH — ECONOMIC STABILITY: INCOME INSECURITY: HOW HARD IS IT FOR YOU TO PAY FOR THE VERY BASICS LIKE FOOD, HOUSING, MEDICAL CARE, AND HEATING?: SOMEWHAT HARD

## 2025-01-07 SDOH — ECONOMIC STABILITY: FOOD INSECURITY: WITHIN THE PAST 12 MONTHS, THE FOOD YOU BOUGHT JUST DIDN'T LAST AND YOU DIDN'T HAVE MONEY TO GET MORE.: NEVER TRUE

## 2025-01-07 ASSESSMENT — PATIENT HEALTH QUESTIONNAIRE - PHQ9
SUM OF ALL RESPONSES TO PHQ9 QUESTIONS 1 & 2: 6
7. TROUBLE CONCENTRATING ON THINGS, SUCH AS READING THE NEWSPAPER OR WATCHING TELEVISION: SEVERAL DAYS
8. MOVING OR SPEAKING SO SLOWLY THAT OTHER PEOPLE COULD HAVE NOTICED. OR THE OPPOSITE, BEING SO FIGETY OR RESTLESS THAT YOU HAVE BEEN MOVING AROUND A LOT MORE THAN USUAL: NOT AT ALL
6. FEELING BAD ABOUT YOURSELF - OR THAT YOU ARE A FAILURE OR HAVE LET YOURSELF OR YOUR FAMILY DOWN: NOT AT ALL
1. LITTLE INTEREST OR PLEASURE IN DOING THINGS: NEARLY EVERY DAY
3. TROUBLE FALLING OR STAYING ASLEEP: NEARLY EVERY DAY
SUM OF ALL RESPONSES TO PHQ QUESTIONS 1-9: 10
SUM OF ALL RESPONSES TO PHQ QUESTIONS 1-9: 10
2. FEELING DOWN, DEPRESSED OR HOPELESS: NEARLY EVERY DAY
SUM OF ALL RESPONSES TO PHQ QUESTIONS 1-9: 10
4. FEELING TIRED OR HAVING LITTLE ENERGY: NOT AT ALL
10. IF YOU CHECKED OFF ANY PROBLEMS, HOW DIFFICULT HAVE THESE PROBLEMS MADE IT FOR YOU TO DO YOUR WORK, TAKE CARE OF THINGS AT HOME, OR GET ALONG WITH OTHER PEOPLE: NOT DIFFICULT AT ALL
9. THOUGHTS THAT YOU WOULD BE BETTER OFF DEAD, OR OF HURTING YOURSELF: NOT AT ALL
SUM OF ALL RESPONSES TO PHQ QUESTIONS 1-9: 10
5. POOR APPETITE OR OVEREATING: NOT AT ALL

## 2025-01-07 NOTE — PATIENT INSTRUCTIONS
Made referral to Dr. Robles.  Will get more info from Urology to see what has been done on their end.  Please reschedule the testing and clinic follow up with [facility] - Dr. Davila.

## 2025-01-07 NOTE — PROGRESS NOTES
microalbumin/variable hypertension - refer to Dr. Robles per patient request.  Continue Farxiga and consider restarting lisinopril.      Hypertension -BP mildly elevated and heart rate are controlled on metoprolol -will continue.  Consider adding lisinopril 2.5 mg daily at next visit if BP stable.      Mixed hyperlipidemia - LDL 71.7 10/2024 controlled but unsure if on anything at this time (last dose of Crestor 5 mg daily reportedly discontinued 6/2024).  Triglycerides elevated at 410 10/2024 - continue to work on DM diet and DM control, low HDL 28 10/2024 - increase fish, consider fish oil.  He was on Lipitor till 2022, then Crestor 5 mg till 6/2024.  Will confirm with Ericka or patient when he gets home if taking statin.      Leg pain/edema - rule out DVT with venous dopplers as history of DVT.  Will check labs for electrolyte issues, BMP for dehydration, Thyroid studies.     Hypothyroid - FT4 low 0.88 7/31/24 - ordered TSH, FT4 and FT3 for further evaluation.    Hypomagnesemia - level 1.6 11/2024, continue OTC supplementation.      Vitamin D deficiency -uncontrolled with level 19.6 11/2024 -patient has been on vitamin D 50,000 mg weekly for years however his level was usually just below 30 or as low as 9.  Question if he is having absorption issues and if would benefit from sublingual liquid supplementation.  Other providers want him to stay on 50,000 IU weekly - restarted this.    Obesity - BMI 37.35- recommended weight loss with improved diet - unable to exercise much with LE issues with pain in feet/ankles.      Right flank/groin/testicular pain - did scrotal u/s 9/2024 - right side with small, slightly complex appearing hydrocele, epididymal cyst, but Urology didn't think that surgery would improve his pain.  Did not recommend surgery.  Renal u/s 9/2024 - tiny non-obstructing renal calculus in each kidney.  We had discussed PT for pulled muscle in the past.    Seizure disorder -stable - following with

## 2025-01-07 NOTE — TELEPHONE ENCOUNTER
Per vo from Dr. Hutson/I called [facility] pharm to find out if pt on Metoprolol and Lisinopril.    Pharmacy states pt picked up Metoprolol tart 25 mg. Bid. On 12/30/24.  This was added to pt list on his office visit today.      Lisinopril 20 mg. Daily was last dispensed on8/30/24 for a 30 day supply.     They have no other lisinopril listed for him after that.

## 2025-01-08 ENCOUNTER — HOSPITAL ENCOUNTER (OUTPATIENT)
Dept: INTERVENTIONAL RADIOLOGY/VASCULAR | Age: 55
Discharge: HOME | End: 2025-01-10
Attending: INTERNAL MEDICINE
Payer: MEDICARE

## 2025-01-08 DIAGNOSIS — M79.662 BILATERAL CALF PAIN: ICD-10-CM

## 2025-01-08 DIAGNOSIS — R60.0 BILATERAL LEG EDEMA: ICD-10-CM

## 2025-01-08 DIAGNOSIS — M79.661 BILATERAL CALF PAIN: ICD-10-CM

## 2025-01-08 LAB — T3FREE SERPL-MCNC: 3.24 PG/ML (ref 2–4.4)

## 2025-01-08 PROCEDURE — 93970 EXTREMITY STUDY: CPT

## 2025-01-09 ENCOUNTER — TELEPHONE (OUTPATIENT)
Dept: CARDIOLOGY CLINIC | Age: 55
End: 2025-01-09

## 2025-01-14 ENCOUNTER — TELEPHONE (OUTPATIENT)
Dept: INTERNAL MEDICINE CLINIC | Age: 55
End: 2025-01-14

## 2025-01-14 NOTE — TELEPHONE ENCOUNTER
Patient called stating that he had his dopplers done and it was normal but he is still having the pain and swelling in right leg . Patient was asking what can be done . Please advise

## 2025-01-15 ENCOUNTER — TELEPHONE (OUTPATIENT)
Dept: INTERNAL MEDICINE CLINIC | Age: 55
End: 2025-01-15

## 2025-01-15 NOTE — TELEPHONE ENCOUNTER
Sent message to "[employer] representative that Hector is interested in the "[employer] CGM.

## 2025-01-18 PROBLEM — G20.A1 PARKINSON'S DISEASE, UNSPECIFIED WHETHER DYSKINESIA PRESENT, UNSPECIFIED WHETHER MANIFESTATIONS FLUCTUATE (HCC): Status: RESOLVED | Noted: 2025-01-07 | Resolved: 2025-01-18

## 2025-01-22 ENCOUNTER — TELEPHONE (OUTPATIENT)
Dept: INTERNAL MEDICINE CLINIC | Age: 55
End: 2025-01-22

## 2025-01-22 NOTE — TELEPHONE ENCOUNTER
----- Message from Dr. Kesha Hutson MD sent at 1/18/2025  7:52 PM EST -----  Ask Ericka or Joy if on any statin - was on Lipitor till 2022, then Crestor 5 mg till 6/2024?

## 2025-01-22 NOTE — TELEPHONE ENCOUNTER
Spoke with Corina at [facility] pharmacy and patient has not been filling any statin . Cofirmed with patient also but he cannot remember why he was off of it .

## 2025-01-22 NOTE — TELEPHONE ENCOUNTER
Received: 4 days ago  Kesha Hutson MD sent to Holly Lopez LPN  Ask patient/Ericka if he is taking Farxiga daily and Cialis every other day.    Confirmed with patient that he is taking Farxiga 10 mg daily and the Cialis every other day .

## 2025-01-27 ENCOUNTER — TELEPHONE (OUTPATIENT)
Dept: INTERNAL MEDICINE CLINIC | Age: 55
End: 2025-01-27

## 2025-01-27 NOTE — TELEPHONE ENCOUNTER
Hector called in asking if he can take his Phenergan any sooner than every 8 hours. States he has been vomiting for 4 days. Please advise

## 2025-01-27 NOTE — PROGRESS NOTES
Called script in to [facility] per Dr Hutson. Patient notified he can take with Phenergan every 8 hrs or alternate every 4 hrs.

## 2025-01-29 RX ORDER — ONDANSETRON 4 MG/1
4 TABLET, FILM COATED ORAL EVERY 8 HOURS PRN
Qty: 30 TABLET | Refills: 0 | OUTPATIENT
Start: 2025-01-29 | End: 2025-02-01

## 2025-01-30 ENCOUNTER — TELEPHONE (OUTPATIENT)
Dept: INTERNAL MEDICINE CLINIC | Age: 55
End: 2025-01-30

## 2025-01-30 NOTE — TELEPHONE ENCOUNTER
Patient still vomiting and BP low .instructed patient to go to Urgent Care or ER as he likely is dehydrated and needs IV fluids . Patient decline the appointment offered at 1:20 earlier today ,

## 2025-02-01 RX ORDER — ONDANSETRON 4 MG/1
4 TABLET, FILM COATED ORAL EVERY 8 HOURS PRN
Qty: 30 TABLET | Refills: 0 | Status: SHIPPED | OUTPATIENT
Start: 2025-02-01 | End: 2025-02-11

## 2025-02-03 ENCOUNTER — OFFICE VISIT (OUTPATIENT)
Dept: NEPHROLOGY | Age: 55
End: 2025-02-03
Payer: MEDICARE

## 2025-02-03 VITALS
OXYGEN SATURATION: 95 % | SYSTOLIC BLOOD PRESSURE: 154 MMHG | BODY MASS INDEX: 36.31 KG/M2 | DIASTOLIC BLOOD PRESSURE: 82 MMHG | HEIGHT: 75 IN | WEIGHT: 292 LBS | HEART RATE: 79 BPM

## 2025-02-03 DIAGNOSIS — E66.01 MORBID (SEVERE) OBESITY DUE TO EXCESS CALORIES: ICD-10-CM

## 2025-02-03 DIAGNOSIS — E11.21 DIABETIC NEPHROPATHY ASSOCIATED WITH TYPE 2 DIABETES MELLITUS (HCC): ICD-10-CM

## 2025-02-03 DIAGNOSIS — E55.9 VITAMIN D DEFICIENCY: ICD-10-CM

## 2025-02-03 DIAGNOSIS — N18.31 STAGE 3A CHRONIC KIDNEY DISEASE (HCC): Primary | ICD-10-CM

## 2025-02-03 DIAGNOSIS — I10 PRIMARY HYPERTENSION: ICD-10-CM

## 2025-02-03 DIAGNOSIS — R80.9 MICROALBUMINURIA: ICD-10-CM

## 2025-02-03 PROCEDURE — G8427 DOCREV CUR MEDS BY ELIG CLIN: HCPCS | Performed by: INTERNAL MEDICINE

## 2025-02-03 PROCEDURE — 3017F COLORECTAL CA SCREEN DOC REV: CPT | Performed by: INTERNAL MEDICINE

## 2025-02-03 PROCEDURE — G8417 CALC BMI ABV UP PARAM F/U: HCPCS | Performed by: INTERNAL MEDICINE

## 2025-02-03 PROCEDURE — 3079F DIAST BP 80-89 MM HG: CPT | Performed by: INTERNAL MEDICINE

## 2025-02-03 PROCEDURE — 2022F DILAT RTA XM EVC RTNOPTHY: CPT | Performed by: INTERNAL MEDICINE

## 2025-02-03 PROCEDURE — 3077F SYST BP >= 140 MM HG: CPT | Performed by: INTERNAL MEDICINE

## 2025-02-03 PROCEDURE — 1036F TOBACCO NON-USER: CPT | Performed by: INTERNAL MEDICINE

## 2025-02-03 PROCEDURE — 3052F HG A1C>EQUAL 8.0%<EQUAL 9.0%: CPT | Performed by: INTERNAL MEDICINE

## 2025-02-03 PROCEDURE — 99204 OFFICE O/P NEW MOD 45 MIN: CPT | Performed by: INTERNAL MEDICINE

## 2025-02-03 RX ORDER — OXYCODONE AND ACETAMINOPHEN 10; 325 MG/1; MG/1
TABLET ORAL
COMMUNITY
Start: 2025-01-14

## 2025-02-03 NOTE — PROGRESS NOTES
Nephrology Consult Note  Patient's Name: Nicolas Mackenzie  8:41 AM  2/3/2025    Nephrologist: Margaret    Reason for Consult:  Elevated serum creatinine level  Requesting Physician:  No att. providers found  PCP: Kesha Hutson MD    Chief Complaint:  None  Assessment    ICD-10-CM    1. Stage 3a chronic kidney disease (HCC)  N18.31       2. Diabetic nephropathy associated with type 2 diabetes mellitus (HCC)  E11.21       3. Primary hypertension  I10       4. Vitamin D deficiency  E55.9       5. Microalbuminuria  R80.9             Plan    Chronic kidney disease likely due to combination of diabetic nephropathy and hypertensive nephrosclerosis.  I discussed that with the patient.  However, he is also on pantoprazole which has been debated in the renal literature if it is nephrotoxic.  I discussed that with the patient.  Will continue that for now.  Avoid any nonsteroidal anti-inflammatory.  Comprehensive list provided to him today.  Will check markers of chronicity.  Random urine protein creatinine ratio.  Will get a kidney ultrasound to rule out obstructive uropathy.  He has difficulties with urination sometimes according to him.  Diabetes mellitus managed by another provider.  Blood pressure is high in the office today.  Continue to monitor at home.  Vitamin D level was low in November 2024.  Will repeat with next appointment.  Check urine miroalbumin creatinine ratio with next appointment as well.  Medications reviewed  No changes otherwise  Return visit in 4 weeks with labs      History Obtained From:  Patient and electronic medical record  History of Present Ilness:    Nicolas Mackenzie is a 54 y.o. male with history of diabetes mellitus, hypertension, gastroesophageal reflux disease, left hemithyroidectomy due to benign follicular adenoma among other multiple medical entities referred to our office due to elevated serum creatinine level.  Review of his record reveals a serum creatinine that has ranged between

## 2025-02-03 NOTE — PATIENT INSTRUCTIONS
Drugs to Avoid with Chronic Kidney Disease    Non-steroidal anti-inflammatory drugs (NSAIDS)    Potential complication and side effects of NSAIDS include:  Kidney injury and worsening kidney function   Risk of stomach ulcer and intestinal bleeding  Fluid retention and edema  Increased Blood Pressure    Non-Steroidal Anti-Inflammatory Drugs    Celecoxib (Celebrex)  Choline and magnesium salicylates (CMT, Trisosal, Trilisate)  Choline salicylate (Arthropan)  Diclofenac (Voltaren, Arthrotec, Cataflam, Cambia, Voltaren-XR, Zipsor)  Diflunisal (Dolobid)  Etodolac (Lodine XL, Lodine)  Famotidine + Ibuprofen (Duexis)  Fenoprofen (Nalfon, Nalfon 200)  Furbiprofen (Asaid)  Ibuprofen (Advil, Motrin, Midol, Nuprin, Genpril, Dolgesic,Profen,, Vicoprofen,Combunox, Actiprofen, Addaprin, Caldolor, Haltran, Q-Profen,Ibren, Menadol, Rufen,Saleto-200, [place],Ultraprin, Uni-Pro,Wal-Profen)  Indomethacin (Indocin, Indomethegan, Indo-Tigist)   Ketoprofen (Orudis  KT, Oruvail, Actron)  Ketorolac (Toradol, Sprix)  Magnesium Sulfate (Arthritab, Luz Select, Niraj's pills, Norberto, Mobidin, Mobogesic)  Meclofenamate Sodium (Ponstel)  Meloxicam (Mobic)  Naproxen (Aleve, Anaprox, Naprosyn, EC-Naprosyn, Naprelan, All Day Pain Relief, Aflaxen, Anaprox-DS, Midol Extended Relief, Naprelan,Prevacid NapraPac, Naprapac, Vimovo)  Nabumetone (Relafen)  Oxaprozin (Daypro)  Piroxicam (Feldene)  Rofecoxib (Vioxx)  Salsalate (Amigesic, Anaflex 750, Disalcid, Marthritic, Mono-gesic, Salflex, Salsitab)  Sodium salicylate (various generics)  Sulindac (Clinoril)  Tolmetin (Tolectin)  Valdecoxib (Bextra)  Mefenamic Acid (Ponstel)  Famotidine and Ibuprofen (Duexis) but not famotidine by itself  Meclofenamate (Meclomen)    Antibiotics  Bactrim  Gentamycin  Tobramycin  Vancomycin  Tetracycline  Macrobid    Other                  IV contrast dye

## 2025-02-03 NOTE — PROGRESS NOTES
Hector says that he saw Dr. Robles approximtely 27 years ago at [facility]. He was previously on dialysis but hasn't been dialyzed since 2023. He says he is \"in kidney failure.\" He had been previously following Dr. Hackett. His wife is an ER nurse at [facility] and she and Hector are not happy with Dr. Hackett so he decided to reach out to Dr. Giron again. He was released from [facility] yesterday for some stomach pain issues.   He reports bilaterally leg swelling knees down. He has had a toe amputation in 2023. He says his BSL are not well controlled. He reports last A1C was 7.9. He feels his BP are \"up and down.\" \"Not consistent and when he stands they drop.\"

## 2025-02-10 ENCOUNTER — HOSPITAL ENCOUNTER (OUTPATIENT)
Dept: ULTRASOUND IMAGING | Age: 55
Discharge: HOME | End: 2025-02-10
Attending: INTERNAL MEDICINE
Payer: MEDICARE

## 2025-02-10 DIAGNOSIS — N18.31 STAGE 3A CHRONIC KIDNEY DISEASE (HCC): ICD-10-CM

## 2025-02-10 PROCEDURE — 76770 US EXAM ABDO BACK WALL COMP: CPT

## 2025-02-11 ENCOUNTER — OFFICE VISIT (OUTPATIENT)
Dept: INTERNAL MEDICINE CLINIC | Age: 55
End: 2025-02-11

## 2025-02-11 VITALS
DIASTOLIC BLOOD PRESSURE: 60 MMHG | HEART RATE: 76 BPM | WEIGHT: 285.6 LBS | HEIGHT: 75 IN | BODY MASS INDEX: 35.51 KG/M2 | SYSTOLIC BLOOD PRESSURE: 96 MMHG | OXYGEN SATURATION: 95 %

## 2025-02-11 DIAGNOSIS — R42 LIGHTHEADEDNESS: ICD-10-CM

## 2025-02-11 DIAGNOSIS — K29.70 GASTRITIS WITHOUT BLEEDING, UNSPECIFIED CHRONICITY, UNSPECIFIED GASTRITIS TYPE: ICD-10-CM

## 2025-02-11 DIAGNOSIS — R11.2 NAUSEA AND VOMITING, UNSPECIFIED VOMITING TYPE: ICD-10-CM

## 2025-02-11 DIAGNOSIS — I95.9 HYPOTENSIVE EPISODE: Primary | ICD-10-CM

## 2025-02-11 RX ORDER — DULOXETIN HYDROCHLORIDE 30 MG/1
30 CAPSULE, DELAYED RELEASE ORAL DAILY
COMMUNITY

## 2025-02-11 RX ORDER — COLESTIPOL HYDROCHLORIDE 1 G/1
1 TABLET ORAL 2 TIMES DAILY
COMMUNITY
Start: 2025-02-04

## 2025-02-11 RX ORDER — SUCRALFATE 1 G/1
1 TABLET ORAL 3 TIMES DAILY
Qty: 120 TABLET | Refills: 3 | Status: SHIPPED | OUTPATIENT
Start: 2025-02-11

## 2025-02-11 SDOH — ECONOMIC STABILITY: FOOD INSECURITY: WITHIN THE PAST 12 MONTHS, THE FOOD YOU BOUGHT JUST DIDN'T LAST AND YOU DIDN'T HAVE MONEY TO GET MORE.: NEVER TRUE

## 2025-02-11 SDOH — ECONOMIC STABILITY: FOOD INSECURITY: WITHIN THE PAST 12 MONTHS, YOU WORRIED THAT YOUR FOOD WOULD RUN OUT BEFORE YOU GOT MONEY TO BUY MORE.: NEVER TRUE

## 2025-02-11 NOTE — PATIENT INSTRUCTIONS
Please stop taking your farxiga, lisinopril, Cialis and your high dose vitamin D    Hold metoprolol if the first number of your blood pressure is less than 100.    Continue drinking lots of fluids!

## 2025-02-11 NOTE — PROGRESS NOTES
1970    Chief Complaint   Patient presents with    Follow-Up from Hospital     D/c 2/2  diarrhea, dehydration and hypoglycemia-Bp has been low at home 70's and 80's systolic  has been lightheaded at home also for the last 3 days       Patient was recently admitted to [facility] for stomach pain and vomiting. He had an EGD which showed distal esophagitis and gastritis. He has had a similar episode before in 9/2024. Patient states none of his medications were changed during hospitalization.  Patient coming in today because he has been having lightheadedness, dizziness and his blood pressure has been dropping. Home BP readings have been 80's/60's and sometimes lower, this is when he is standing, usually closer to 110/80 when he is sitting. Patient vomited on way to appointment today Denies syncope. States it is worse when going from sitting to standing. He still has occasional vomiting since his discharge, endorses chronic diarrhea without a recent change. He has been eating and drinking normally. Denies recent infections or antibiotic use. States he has a \"weird\" sensation in his chest, but denies pain, pressure and palpitations. Repeat BP 90/64. Gastroparesis workup including gastric emptying study at end of February.       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 Jordan (due to enlarged spleen)    Anesthesia     seizures with brain surgery due to blood sugar got really high    Bile reflux gastritis     per EGD 11/16/21 - Dr. Gregorio - to start Carafate.    Bipolar disorder (HCC)     Cancer (HCC)     Charcot's joint of foot, left     Chest pain     previously seeing [facility] cardiologist, now seeing Dr. Valdez (LAD bridging on cath 4/2016), no CAD per [facility] 6/2022.    Chronic back pain     Chronic bronchitis (HCC)     Dr. Rocha    Colon polyp 08/30/2016    Depression

## 2025-02-19 ENCOUNTER — TELEPHONE (OUTPATIENT)
Dept: INTERNAL MEDICINE CLINIC | Age: 55
End: 2025-02-19

## 2025-02-19 NOTE — TELEPHONE ENCOUNTER
Patient called stating that he is still having issues with low BP . He stopped the medications that Dr. Obando instructed and has increased his fluid intake . Patient asking if any thing else he can sean a home . Please advise .

## 2025-02-20 NOTE — TELEPHONE ENCOUNTER
I left message - told him to make sure he is not on lisinopril, Cialis, and can stop metoprolol if BP still low.  If symptomatic, lie down and drink fluids.  Call if need additional instruction.

## 2025-02-24 NOTE — TELEPHONE ENCOUNTER
Patient call again today stating that he has been off of the medications but BP still dropping when he stands . Patient also states a complaint of chest discomfort , not chest pain but he is unable to describe what it feels like . Patient is having swallowing study tomorrow . Instructed patient to continue to stay hydrated , change his positions slowly when standing up and keep track of BPs fie a few days and call in the readings . Next appointment 3/11/25. Any other suggestions ?

## 2025-02-25 ENCOUNTER — APPOINTMENT (OUTPATIENT)
Dept: CT IMAGING | Age: 55
End: 2025-02-25
Payer: MEDICARE

## 2025-02-25 ENCOUNTER — APPOINTMENT (OUTPATIENT)
Dept: GENERAL RADIOLOGY | Age: 55
End: 2025-02-25
Payer: MEDICARE

## 2025-02-25 ENCOUNTER — HOSPITAL ENCOUNTER (OUTPATIENT)
Dept: NUCLEAR MEDICINE | Age: 55
Discharge: HOME | End: 2025-02-25
Payer: MEDICARE

## 2025-02-25 ENCOUNTER — HOSPITAL ENCOUNTER (EMERGENCY)
Age: 55
Discharge: HOME | End: 2025-02-25
Payer: MEDICARE

## 2025-02-25 VITALS
TEMPERATURE: 98.9 F | SYSTOLIC BLOOD PRESSURE: 110 MMHG | DIASTOLIC BLOOD PRESSURE: 73 MMHG | HEART RATE: 74 BPM | OXYGEN SATURATION: 93 % | RESPIRATION RATE: 23 BRPM

## 2025-02-25 DIAGNOSIS — R07.9 CHEST PAIN, UNSPECIFIED TYPE: Primary | ICD-10-CM

## 2025-02-25 DIAGNOSIS — R11.2 NAUSEA AND VOMITING, UNSPECIFIED VOMITING TYPE: ICD-10-CM

## 2025-02-25 DIAGNOSIS — K31.84 GASTROPARESIS: ICD-10-CM

## 2025-02-25 LAB
ANION GAP SERPL CALC-SCNC: 14 MEQ/L (ref 8–16)
B-OH-BUTYR SERPL-MSCNC: 4.78 MG/DL (ref 0.2–2.81)
BASE EXCESS BLDA CALC-SCNC: 2.4 MMOL/L (ref -2–3)
BASOPHILS ABSOLUTE: 0 THOU/MM3 (ref 0–0.1)
BASOPHILS NFR BLD AUTO: 0.4 %
BUN SERPL-MCNC: 18 MG/DL (ref 8–23)
CALCIUM SERPL-MCNC: 9.7 MG/DL (ref 8.2–9.6)
CHLORIDE SERPL-SCNC: 99 MEQ/L (ref 98–111)
CO2 SERPL-SCNC: 26 MEQ/L (ref 22–29)
COLLECTED BY:: NORMAL
CREAT SERPL-MCNC: 1 MG/DL (ref 0.7–1.2)
DEPRECATED RDW RBC AUTO: 38.8 FL (ref 35–45)
DEVICE: NORMAL
EKG ATRIAL RATE: 72 BPM
EKG P AXIS: 36 DEGREES
EKG P-R INTERVAL: 188 MS
EKG Q-T INTERVAL: 386 MS
EKG QRS DURATION: 92 MS
EKG QTC CALCULATION (BAZETT): 422 MS
EKG R AXIS: 17 DEGREES
EKG T AXIS: 28 DEGREES
EKG VENTRICULAR RATE: 72 BPM
EOSINOPHIL NFR BLD AUTO: 1.7 %
EOSINOPHILS ABSOLUTE: 0.1 THOU/MM3 (ref 0–0.4)
ERYTHROCYTE [DISTWIDTH] IN BLOOD BY AUTOMATED COUNT: 13.1 % (ref 11.5–14.5)
GFR SERPL CREATININE-BSD FRML MDRD: 89 ML/MIN/1.73M2
GLUCOSE BLD STRIP.AUTO-MCNC: 320 MG/DL (ref 70–108)
GLUCOSE SERPL-MCNC: 356 MG/DL (ref 70–108)
HCO3 BLDA-SCNC: 28 MMOL/L (ref 23–28)
HCT VFR BLD AUTO: 40.5 % (ref 42–52)
HGB BLD-MCNC: 15 GM/DL (ref 14–18)
IMM GRANULOCYTES # BLD AUTO: 0.04 THOU/MM3 (ref 0–0.07)
IMM GRANULOCYTES NFR BLD AUTO: 0.6 %
LYMPHOCYTES ABSOLUTE: 1.9 THOU/MM3 (ref 1–4.8)
LYMPHOCYTES NFR BLD AUTO: 27.1 %
MCH RBC QN AUTO: 30.4 PG (ref 26–33)
MCHC RBC AUTO-ENTMCNC: 37 GM/DL (ref 32.2–35.5)
MCV RBC AUTO: 82 FL (ref 80–94)
MONOCYTES ABSOLUTE: 0.4 THOU/MM3 (ref 0.4–1.3)
MONOCYTES NFR BLD AUTO: 6.1 %
NEUTROPHILS ABSOLUTE: 4.6 THOU/MM3 (ref 1.8–7.7)
NEUTROPHILS NFR BLD AUTO: 64.1 %
NRBC BLD AUTO-RTO: 0 /100 WBC
NT-PROBNP SERPL IA-MCNC: 95 PG/ML (ref 0–124)
OSMOLALITY SERPL CALC.SUM OF ELEC: 293.7 MOSMOL/KG (ref 275–300)
PCO2 TEMP ADJ BLDMV: 47 MMHG (ref 41–51)
PH BLDMV: 7.39 [PH] (ref 7.31–7.41)
PLATELET # BLD AUTO: 190 THOU/MM3 (ref 130–400)
PMV BLD AUTO: 10.3 FL (ref 9.4–12.4)
PO2 BLDMV: 31 MMHG (ref 25–40)
POTASSIUM SERPL-SCNC: 4.1 MEQ/L (ref 3.5–5.2)
RBC # BLD AUTO: 4.94 MILL/MM3 (ref 4.7–6.1)
SAO2 % BLDMV: 57 %
SITE: NORMAL
SODIUM SERPL-SCNC: 139 MEQ/L (ref 135–145)
TROPONIN, HIGH SENSITIVITY: 18 NG/L (ref 0–12)
TROPONIN, HIGH SENSITIVITY: 19 NG/L (ref 0–12)
VENTILATION MODE VENT: NORMAL
WBC # BLD AUTO: 7.1 THOU/MM3 (ref 4.8–10.8)

## 2025-02-25 PROCEDURE — 80048 BASIC METABOLIC PNL TOTAL CA: CPT

## 2025-02-25 PROCEDURE — 71275 CT ANGIOGRAPHY CHEST: CPT

## 2025-02-25 PROCEDURE — 78264 GASTRIC EMPTYING IMG STUDY: CPT

## 2025-02-25 PROCEDURE — 6360000002 HC RX W HCPCS: Performed by: PHYSICIAN ASSISTANT

## 2025-02-25 PROCEDURE — 3430000000 HC RX DIAGNOSTIC RADIOPHARMACEUTICAL: Performed by: NURSE PRACTITIONER

## 2025-02-25 PROCEDURE — 93005 ELECTROCARDIOGRAM TRACING: CPT | Performed by: EMERGENCY MEDICINE

## 2025-02-25 PROCEDURE — 6370000000 HC RX 637 (ALT 250 FOR IP): Performed by: PHYSICIAN ASSISTANT

## 2025-02-25 PROCEDURE — 83880 ASSAY OF NATRIURETIC PEPTIDE: CPT

## 2025-02-25 PROCEDURE — A9541 TC99M SULFUR COLLOID: HCPCS | Performed by: NURSE PRACTITIONER

## 2025-02-25 PROCEDURE — 82948 REAGENT STRIP/BLOOD GLUCOSE: CPT

## 2025-02-25 PROCEDURE — 85025 COMPLETE CBC W/AUTO DIFF WBC: CPT

## 2025-02-25 PROCEDURE — 6360000004 HC RX CONTRAST MEDICATION: Performed by: PHYSICIAN ASSISTANT

## 2025-02-25 PROCEDURE — 84484 ASSAY OF TROPONIN QUANT: CPT

## 2025-02-25 PROCEDURE — 71046 X-RAY EXAM CHEST 2 VIEWS: CPT

## 2025-02-25 PROCEDURE — 82803 BLOOD GASES ANY COMBINATION: CPT

## 2025-02-25 PROCEDURE — 93010 ELECTROCARDIOGRAM REPORT: CPT | Performed by: NUCLEAR MEDICINE

## 2025-02-25 PROCEDURE — 70450 CT HEAD/BRAIN W/O DYE: CPT

## 2025-02-25 PROCEDURE — 82010 KETONE BODYS QUAN: CPT

## 2025-02-25 PROCEDURE — 36415 COLL VENOUS BLD VENIPUNCTURE: CPT

## 2025-02-25 PROCEDURE — 72125 CT NECK SPINE W/O DYE: CPT

## 2025-02-25 RX ORDER — MORPHINE SULFATE 2 MG/ML
2 INJECTION, SOLUTION INTRAMUSCULAR; INTRAVENOUS ONCE
Status: COMPLETED | OUTPATIENT
Start: 2025-02-25 | End: 2025-02-25

## 2025-02-25 RX ORDER — METOCLOPRAMIDE 10 MG/1
10 TABLET ORAL 4 TIMES DAILY
Qty: 20 TABLET | Refills: 0 | Status: SHIPPED | OUTPATIENT
Start: 2025-02-25 | End: 2025-03-02

## 2025-02-25 RX ORDER — IOPAMIDOL 755 MG/ML
80 INJECTION, SOLUTION INTRAVASCULAR
Status: COMPLETED | OUTPATIENT
Start: 2025-02-25 | End: 2025-02-25

## 2025-02-25 RX ORDER — ONDANSETRON 2 MG/ML
4 INJECTION INTRAMUSCULAR; INTRAVENOUS ONCE
Status: DISCONTINUED | OUTPATIENT
Start: 2025-02-25 | End: 2025-02-25 | Stop reason: HOSPADM

## 2025-02-25 RX ADMIN — MORPHINE SULFATE 2 MG: 2 INJECTION, SOLUTION INTRAMUSCULAR; INTRAVENOUS at 14:15

## 2025-02-25 RX ADMIN — Medication 7 UNITS: at 14:15

## 2025-02-25 RX ADMIN — Medication 1 MILLICURIE: at 08:28

## 2025-02-25 RX ADMIN — IOPAMIDOL 80 ML: 755 INJECTION, SOLUTION INTRAVENOUS at 14:54

## 2025-02-25 ASSESSMENT — HEART SCORE: ECG: NORMAL

## 2025-02-25 NOTE — TELEPHONE ENCOUNTER
Discussed with Holly - she did tell him to go to ED if chest pain.  He stated it was difficult to describe and not chest pain.  He is now in ED.    I discussed case with Leticia regarding likely autonomic dysfunction due to long standing diabetes.  She did find an off label option of Strattera 10 mg BID - would stay at the low level as also on Cymbalta and this affects norepi levels.  Will need to discuss with patient after out of ED/hospital.  Would also like his significant other - Martye to review medications and make sure we get updated med list.  Patient never brings in medication list or med bottles.

## 2025-02-25 NOTE — ED PROVIDER NOTES
of Doppler ultrasound 05/29/2011    No hemodynamically significant carotid stenosis is identified. A thyroid nodule on each side. Dedicated ultrasound of thyroid gland is suggested to further evaluate if clinically indicated.     History of pulmonary embolism 2007    s/p GFF, related to knee surgery    Hx of blood clots     leg and lung    Hyperammonemia 03/12/2014    Hyperlipidemia     severely elevated triglycerides    Hypertension     diastolic    Intracranial arachnoid cyst 11/2012    Dr. Ruiz drained, complicated with seizures, DKA    Irritable bowel syndrome     Kidney failure 02/01/2023    Lithium toxicity 12/26/2014    Liver disease     Angi James - elevated LFT - positive smooth muscle antibody, steatosis per liver bx 3/2014 with Dr. Gregorio    MDRO (multiple drug resistant organisms) resistance 2012    Medication overdose, insulin 07/16/2014    MRSA (methicillin resistant staph aureus) culture positive     h/o in foot and before brain surgery    MRSA infection within last 3 months 11/10/2021    Nausea & vomiting 12/26/2014    Nephrolithiasis     noted on CT abdomen 9/2016, 6/2019, left renal calculi 2/2023 per renal u/s at [facility]    Neuropathy     MISTI (obstructive sleep apnea) 01/16/2021    Pancreatic insufficiency     Positive SANDY (antinuclear antibody)     Dr. Maharaj - first visit in 6/2013    Prolonged emergence from general anesthesia     Restless legs syndrome     Seizures (HCC)     started with brain surgery    Sinus tachycardia     Holter 3/2013 - seeing Dr. Valdez    Skull fracture (HCC)     clips     Sleep apnea     positive sleep apnea per study 10/2020, s/p UPPP surgery 2021    Suicide attempt (HCC) 07/16/2014    Type II or unspecified type diabetes mellitus without mention of complication, not stated as uncontrolled 2007       Patient Active Problem List   Diagnosis Code    Depression F32.A    Liver disease K76.9    Restless legs syndrome G25.81    Chronic back pain M54.9, G89.29    Other chest

## 2025-02-26 ENCOUNTER — TELEPHONE (OUTPATIENT)
Dept: INTERNAL MEDICINE CLINIC | Age: 55
End: 2025-02-26

## 2025-02-26 ENCOUNTER — LAB (OUTPATIENT)
Dept: LAB | Age: 55
End: 2025-02-26

## 2025-02-26 DIAGNOSIS — K31.84 GASTROPARESIS: ICD-10-CM

## 2025-02-26 DIAGNOSIS — N18.31 STAGE 3A CHRONIC KIDNEY DISEASE (HCC): ICD-10-CM

## 2025-02-26 DIAGNOSIS — Z79.4 TYPE 2 DIABETES MELLITUS WITH DIABETIC NEUROPATHY, WITH LONG-TERM CURRENT USE OF INSULIN (HCC): ICD-10-CM

## 2025-02-26 DIAGNOSIS — R80.9 MICROALBUMINURIA: ICD-10-CM

## 2025-02-26 DIAGNOSIS — E55.9 VITAMIN D DEFICIENCY: ICD-10-CM

## 2025-02-26 DIAGNOSIS — E11.40 TYPE 2 DIABETES MELLITUS WITH DIABETIC NEUROPATHY, WITH LONG-TERM CURRENT USE OF INSULIN (HCC): ICD-10-CM

## 2025-02-26 DIAGNOSIS — G90.3 NEUROGENIC ORTHOSTATIC HYPOTENSION (HCC): Primary | ICD-10-CM

## 2025-02-26 LAB
25(OH)D3 SERPL-MCNC: 19 NG/ML (ref 30–100)
ANION GAP SERPL CALC-SCNC: 14 MEQ/L (ref 8–16)
BUN SERPL-MCNC: 15 MG/DL (ref 8–23)
CALCIUM SERPL-MCNC: 9.2 MG/DL (ref 8.2–9.6)
CHLORIDE SERPL-SCNC: 99 MEQ/L (ref 98–111)
CO2 SERPL-SCNC: 24 MEQ/L (ref 22–29)
CREAT SERPL-MCNC: 1.2 MG/DL (ref 0.7–1.2)
CREAT UR-MCNC: 83.9 MG/DL
GFR SERPL CREATININE-BSD FRML MDRD: 72 ML/MIN/1.73M2
GLUCOSE SERPL-MCNC: 347 MG/DL (ref 74–109)
MICROALBUMIN UR-MCNC: 10.3 MG/DL
MICROALBUMIN/CREAT RATIO PNL UR: 123 MG/G (ref 0–30)
POTASSIUM SERPL-SCNC: 4.1 MEQ/L (ref 3.5–5.2)
PTH-INTACT SERPL-MCNC: 62 PG/ML (ref 15–65)
SODIUM SERPL-SCNC: 137 MEQ/L (ref 135–145)

## 2025-02-26 RX ORDER — ATOMOXETINE 10 MG/1
CAPSULE ORAL
Qty: 60 CAPSULE | Refills: 2 | Status: SHIPPED | OUTPATIENT
Start: 2025-02-26

## 2025-02-26 NOTE — TELEPHONE ENCOUNTER
Patient called stating that he is still having the low BP off and on when standing . BP 2-24 67/53 , 88./69, 72/61 , 2/25 121/88 , 88/53 , 2-26 83/56 when standing . Patient states that he  is drinking 6 to 7  16 oz glasses of water a day , not wearing compression hose as he was previously instructed . Stress the importance of using compress hose and use care when changing positions . Next appointment 3/11.

## 2025-02-27 ENCOUNTER — TELEPHONE (OUTPATIENT)
Dept: NEPHROLOGY | Age: 55
End: 2025-02-27

## 2025-02-27 ENCOUNTER — TELEPHONE (OUTPATIENT)
Dept: INTERNAL MEDICINE CLINIC | Age: 55
End: 2025-02-27

## 2025-02-27 NOTE — TELEPHONE ENCOUNTER
Patient call and left message he has a BP this morning of 54/21.  Spoke with patient and he has picked up the Strattera and got it started . Reinforced wearing compression hose or wrapping legs . If BP low lay down and elevate your feet and if BP does not come up then go to ER . Instructed patient to bring in all of his medications and BP machine to his appointment .

## 2025-02-27 NOTE — TELEPHONE ENCOUNTER
I spoke with patient and educated regarding below instructions:    Will send in new script for Strattera 10 mg BID for off label use for hypotension.  Call if HR > 100.    Vitamin D deficiency - re-start 50,000 IU weekly.    Gastroparesis was documented on his gastric emptying study.  Recommend low residue diet - will order dietician referral.    Will need to re-evaluate statin - but wait to start till after tolerate the other 2 medications.

## 2025-02-28 NOTE — TELEPHONE ENCOUNTER
I confirmed with patient that he is not taking metoprolol at all. He asking for an opinion from Dr. Robles.  I asked how he was feeling.  He stated still dizzy and light headed every time he stands up.  
Left message to patient explaining Dr. Robles's recommendation.  
Patient called in saying that his blood pressures have been running low.  He was in the ER on 2/25 at [facility] for Chest pains and low blood pressure and they didn't do much for him.  He said his blood pressure only seems to drop when he stands up and walks and he gets extremely light headed and dizzy.  He reported that his blood pressure this morning was 54/21. I had him check it again and the reading was 90/62.     This is a new patient of ours as of 2/3/25  
Attending Attestation (For Attendings USE Only)...

## 2025-03-03 ENCOUNTER — OFFICE VISIT (OUTPATIENT)
Dept: NEPHROLOGY | Age: 55
End: 2025-03-03
Payer: MEDICARE

## 2025-03-03 VITALS
OXYGEN SATURATION: 94 % | SYSTOLIC BLOOD PRESSURE: 150 MMHG | BODY MASS INDEX: 34.07 KG/M2 | WEIGHT: 274 LBS | HEIGHT: 75 IN | HEART RATE: 85 BPM | DIASTOLIC BLOOD PRESSURE: 84 MMHG

## 2025-03-03 DIAGNOSIS — N18.2 CKD (CHRONIC KIDNEY DISEASE), STAGE II: Primary | ICD-10-CM

## 2025-03-03 DIAGNOSIS — E55.9 VITAMIN D DEFICIENCY: ICD-10-CM

## 2025-03-03 DIAGNOSIS — N20.0 BILATERAL NEPHROLITHIASIS: ICD-10-CM

## 2025-03-03 DIAGNOSIS — R80.9 MICROALBUMINURIA: ICD-10-CM

## 2025-03-03 DIAGNOSIS — E11.21 DIABETIC NEPHROPATHY ASSOCIATED WITH TYPE 2 DIABETES MELLITUS (HCC): ICD-10-CM

## 2025-03-03 PROCEDURE — 2022F DILAT RTA XM EVC RTNOPTHY: CPT | Performed by: INTERNAL MEDICINE

## 2025-03-03 PROCEDURE — 99214 OFFICE O/P EST MOD 30 MIN: CPT | Performed by: INTERNAL MEDICINE

## 2025-03-03 PROCEDURE — 3077F SYST BP >= 140 MM HG: CPT | Performed by: INTERNAL MEDICINE

## 2025-03-03 PROCEDURE — G8427 DOCREV CUR MEDS BY ELIG CLIN: HCPCS | Performed by: INTERNAL MEDICINE

## 2025-03-03 PROCEDURE — G8417 CALC BMI ABV UP PARAM F/U: HCPCS | Performed by: INTERNAL MEDICINE

## 2025-03-03 PROCEDURE — 3079F DIAST BP 80-89 MM HG: CPT | Performed by: INTERNAL MEDICINE

## 2025-03-03 PROCEDURE — 1036F TOBACCO NON-USER: CPT | Performed by: INTERNAL MEDICINE

## 2025-03-03 PROCEDURE — 3052F HG A1C>EQUAL 8.0%<EQUAL 9.0%: CPT | Performed by: INTERNAL MEDICINE

## 2025-03-03 PROCEDURE — 3017F COLORECTAL CA SCREEN DOC REV: CPT | Performed by: INTERNAL MEDICINE

## 2025-03-03 RX ORDER — LOSARTAN POTASSIUM 100 MG/1
100 TABLET ORAL DAILY
Qty: 90 TABLET | Refills: 1 | Status: SHIPPED | OUTPATIENT
Start: 2025-03-03

## 2025-03-03 RX ORDER — OXCARBAZEPINE 150 MG/1
1 TABLET, FILM COATED ORAL 2 TIMES DAILY
COMMUNITY
Start: 2025-02-04

## 2025-03-03 RX ORDER — ERGOCALCIFEROL 1.25 MG/1
50000 CAPSULE, LIQUID FILLED ORAL
Qty: 30 CAPSULE | Refills: 3 | Status: SHIPPED | OUTPATIENT
Start: 2025-03-03

## 2025-03-03 RX ORDER — TADALAFIL 10 MG/1
TABLET ORAL
COMMUNITY

## 2025-03-03 NOTE — PROGRESS NOTES
Hector reports unexplained 30 lb weight loss, N/V low BP, light headed, dizziness and frequent falls recently.  He was a new/returning patient last month. Previous dialysis patient. No dialysis since 2023. He came to [facility] last week. Was treated and released for hypotension. States BP is still \"up and down\" Bilateral leg swelling the same. BSL still uncontrolled.   He does have gastroparesis. He has a FU this month with GI.    
09/19/2024 01:36 PM    LEUKOCYTESUR Negative 01/30/2025 10:40 PM    LEUKOCYTESUR NEGATIVE 09/19/2024 01:36 PM    UROBILINOGEN 2 mg/dL 01/30/2025 10:40 PM    BILIRUBINUR Negative 01/30/2025 10:40 PM    BLOODU NEGATIVE 09/19/2024 01:36 PM    GLUCOSEU >1000 mg/dL 01/30/2025 10:40 PM    KETUA Trace 01/30/2025 10:40 PM    AMORPHOUS NONE SEEN 08/31/2011 02:58 PM      Microalbumen/Creatinine ratio:  No components found for: \"RUCREAT\"    Objective:   Vitals:   Vitals:    03/03/25 0919   BP: (!) 150/84   Pulse: 85   SpO2: 94%         Constitutional:  Alert, awake, no apparent distress  Skin:normal with no rash or any significant lesions  HEENT:Pupils are reactive .Throat is clear.  Oral mucosa is moist.  Neck:supple with no thyromegaly, JVD, lymphadenopathy or bruit **  Cardiovascular: Regular sinus rhythm without murmur, rubs or gallops   Respiratory:  Clear to auscultation with no wheezes or rales  Abdomen: Good bowel sound, soft, non tender and no bruit  Ext: No LE edema  Musculoskeletal:Intact  Neuro:Alert, awake and oriented with no obvious focal deficit.  Speech is normal.**    Electronically signed by Kory Robles MD on 3/3/2025 at 8:23 AM   **This report has been created using voice recognition software. It maycontain minor  errors which are inherent in voice recognition technology.**

## 2025-03-03 NOTE — PATIENT INSTRUCTIONS
0  Flour, All Purpose                                            1 cup                                  12.8  Flour (for thickener)                                         1 TB                                     0.8     Flour, Whole Wheat                                         1 cup                                  18.4        VERY HIGH PROTEIN  Dairy and Eggs  Cheese, cheddar, grated                                 1/8 cup                                 7.1  Egg, whole, medium                                         1                                          5.6  Milk 2 %                                                            1 cup                                   8.0  Whipped Cream (Dawn Whip)                          1 TB                                    0.1    MEAT AND POULTRY  [place]                                                               1 slice                                  3.0  Pork or Ham, cooked                                        2 TB                                    4.0  Shrimp, cooked                                                 1 ounce                               5.9    NUTS, NUT BUTTERS, AND SEEDS  Pecans, chopped                                               1 TB                                   0.7  Walnuts, chopped                                              1 TB                                   1.1

## 2025-03-11 ENCOUNTER — OFFICE VISIT (OUTPATIENT)
Dept: INTERNAL MEDICINE CLINIC | Age: 55
End: 2025-03-11

## 2025-03-11 VITALS
SYSTOLIC BLOOD PRESSURE: 128 MMHG | TEMPERATURE: 98.7 F | HEART RATE: 84 BPM | DIASTOLIC BLOOD PRESSURE: 76 MMHG | WEIGHT: 275 LBS | HEIGHT: 75 IN | BODY MASS INDEX: 34.19 KG/M2

## 2025-03-11 DIAGNOSIS — Z79.4 TYPE 2 DIABETES MELLITUS WITH DIABETIC NEUROPATHY, WITH LONG-TERM CURRENT USE OF INSULIN (HCC): ICD-10-CM

## 2025-03-11 DIAGNOSIS — E11.40 TYPE 2 DIABETES MELLITUS WITH DIABETIC NEUROPATHY, WITH LONG-TERM CURRENT USE OF INSULIN (HCC): ICD-10-CM

## 2025-03-11 DIAGNOSIS — R11.2 NON-INTRACTABLE VOMITING WITH NAUSEA: ICD-10-CM

## 2025-03-11 DIAGNOSIS — I95.9 HYPOTENSIVE EPISODE: Primary | ICD-10-CM

## 2025-03-11 DIAGNOSIS — R42 LIGHTHEADEDNESS: ICD-10-CM

## 2025-03-11 RX ORDER — PROMETHAZINE HYDROCHLORIDE 12.5 MG/1
12.5-25 TABLET ORAL EVERY 8 HOURS PRN
Qty: 60 TABLET | Refills: 1 | Status: SHIPPED | OUTPATIENT
Start: 2025-03-11

## 2025-03-11 NOTE — PROGRESS NOTES
1970    Chief Complaint   Patient presents with    Nausea & Vomiting     4 weeks     Gastritis            Pt is a 54 y.o. male who presents for a follow up visit of chronic issues.     HPI     Leg edema/calf pain bilaterally x3 weeks - right worse than left currently.  XR right ankle last week with Dr. Zabala (podiatry) due increased pain - reportedly XR unchanged.  He is in ankle brace.  He was seen urgently 11/19/24 in my clinic for left posterior calf pain x weeks.  Ordered venous doppler and XR and he did not do them.   He has leg edema - on Farxiga.  He is not wearing his left LE boot as complains of leg edema by end of day and uncomfortable.  Again went over limiting salt in diet, keeping legs above level of heart and suggested compression hose or wrapping in ACE wraps - he is non-compliant with compression/elevation due to neuropathy.  He states he is now willing to do the venous doppler.  Check electrolytes, BMP looking for dehydration, thyroid studies for things that could cause myalgia.  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  He is already seeing pain clinic - on narcotics.     Left foot - per patient all the foot bones are fractured on XR last week - so swollen today he couldn't get boot on -has regular shoe on today.  He is following with Dr. Zabala.  This is all part of Charcot abdi tooth.     DM - uncontrolled - HgA1c 6.2 -> 7.6 6/20/24 -> 8.3 1/2025 today on Basaglar 80 Units BID, short acting 30 plus SSI with meals- 60-80 Units (now up to 300 Units a day of short acting insulin).  No recent steroids, no infection except vomiting and diarrhea around [place].  Hyperglycemia be from severe pain in feet/LE - seeing pain management at [facility] - on narcotics. Nerve stimulator quit working.  He is limited in how much pain med he can tolerate due to higher doses of narcotics affect his mentation.  He is trying to stay on Mediterranean diet.  Consider 24 hour urine free cortisol level, low dose

## 2025-03-19 ENCOUNTER — TELEPHONE (OUTPATIENT)
Dept: INTERNAL MEDICINE CLINIC | Age: 55
End: 2025-03-19

## 2025-03-19 DIAGNOSIS — Z79.4 TYPE 2 DIABETES MELLITUS WITH DIABETIC NEUROPATHY, WITH LONG-TERM CURRENT USE OF INSULIN (HCC): Primary | ICD-10-CM

## 2025-03-19 DIAGNOSIS — E11.40 TYPE 2 DIABETES MELLITUS WITH DIABETIC NEUROPATHY, WITH LONG-TERM CURRENT USE OF INSULIN (HCC): Primary | ICD-10-CM

## 2025-03-19 NOTE — TELEPHONE ENCOUNTER
Hector called back  - he reports that he has a \"low\" amount of ketones in his urine. I instructed him that it would still be recommended to go to the ER because his sugars are not responding to his insulin doses - patient continues to refuse.     I instructed Hector to try using a new box of insulin, keep checking his glucose and urine ketones, keep drink plenty of water and to go to the ER if nausea/vomiting worsen or if his sugars do not respond to 2 or more of the doses of the new box of insulin.     Follow-up phone call tomorrow.

## 2025-03-19 NOTE — TELEPHONE ENCOUNTER
Hector reports nausea/vomiting, increased urination - every 20 minutes. Blood sugars high x4 days - mostly in the 600s. Denies steroids/recent infection. 30 lb weight loss over the last several weeks. Eating little/no carb. Drinking water frequently, taking insulin regularly.    Current medications:  Basaglar 80 units BID - confirmed unexpired product  Humalog - 300 units daily divided - confirmed unexpired product  Not taking Farxiga - stopped temporarily by patient due to frequent urination    Recommendations:   Advised Hector present to the ER immediately - he is likely in DKA given the duration of his hyperglycemia and symptoms   -Patient refuses to go to the ER at this time  Advised Hector to check urine ketones - if ketones are moderate or higher, present to the ER   -Sent ketone strip prescription.     For Pharmacy Admin Tracking Only    Program: Medical Group  CPA in place:  Yes  Recommendation Provided To: Patient/Caregiver: 1 via Telephone  Intervention Detail: New Rx: 1, reason: Needs Additional Therapy  Intervention Accepted By: Patient/Caregiver: 1  Gap Closed?: No   Time Spent (min): 15        Leticia Ramos, PharmD, BCPS, [facility]  Internal Medicine Clinical Pharmacist  [phone]

## 2025-03-20 NOTE — TELEPHONE ENCOUNTER
Follow-up phone 24 hours later.     BG range 570-600  Nausea/vomiting has worsened- he cannot keep fluids down consistently  Hector hasn't eaten in 2 days  Ketones done this AM - small-moderate    Dosing insulin:   Basaglar 80 units BID - confirmed unexpired product, new box of insulin opened  Humalog - 50 units every few hours - 300 units       Plan:   Recommended Hector go to the ER  Increase Basaglar 90 units BID  Humalog 60 units every 3-4 hours if sugar remain high   Drink at least some fluids with carbs every day (at least 50g)    For Pharmacy Admin Tracking Only    Program: Medical Group  CPA in place:  Yes  Recommendation Provided To: Patient/Caregiver: 2 via Telephone  Intervention Detail: Dose Adjustment: 1, reason: Therapy Optimization  Intervention Accepted By: Patient/Caregiver: 2  Gap Closed?: No   Time Spent (min): 30        Leticia Ramos, PharmD, BCPS, [facility]  Internal Medicine Clinical Pharmacist  [phone]

## 2025-03-27 ENCOUNTER — TELEPHONE (OUTPATIENT)
Dept: INTERNAL MEDICINE CLINIC | Age: 55
End: 2025-03-27

## 2025-03-27 NOTE — TELEPHONE ENCOUNTER
Patient called asking if you are aware of any gastroparesis clinics around this area . Patricia Robledo referred him to [facility] but he doesn't really want to go that far .

## 2025-04-07 NOTE — CARE COORDINATION
Discharging pt from care coordination d/t inability to make contact following multiple attempts. My Chart message sent to pt. Pain pump refill for home refill with [facility]

## 2025-04-10 ENCOUNTER — TELEPHONE (OUTPATIENT)
Dept: INTERNAL MEDICINE CLINIC | Age: 55
End: 2025-04-10

## 2025-04-10 DIAGNOSIS — E11.40 TYPE 2 DIABETES MELLITUS WITH DIABETIC NEUROPATHY, WITH LONG-TERM CURRENT USE OF INSULIN (HCC): ICD-10-CM

## 2025-04-10 DIAGNOSIS — Z79.4 TYPE 2 DIABETES MELLITUS WITH DIABETIC NEUROPATHY, WITH LONG-TERM CURRENT USE OF INSULIN (HCC): ICD-10-CM

## 2025-04-10 RX ORDER — INSULIN GLARGINE 100 [IU]/ML
90 INJECTION, SOLUTION SUBCUTANEOUS 2 TIMES DAILY
Qty: 45 ML | Refills: 0 | Status: ON HOLD | OUTPATIENT
Start: 2025-04-10 | End: 2025-04-24

## 2025-04-10 NOTE — TELEPHONE ENCOUNTER
Hector reports that he's out of Basaglar - normally gets through [employer], but needs 2025 renewal. Will fill out new 2025 application.     Also sent 1 box of Basalgar to the pharmacy as a bridge fill - should be $35 or less.     For Pharmacy Admin Tracking Only    Program: Medical Group  CPA in place:  Yes  Recommendation Provided To: Patient/Caregiver: 1 via Telephone  Intervention Detail: New Rx: 1, reason: Cost/Formulary Change  Intervention Accepted By: Patient/Caregiver: 1  Gap Closed?: No   Time Spent (min): 15        Leticia Ramos, PharmD, BCPS, [facility]  Internal Medicine Clinical Pharmacist  [phone]

## 2025-04-14 NOTE — TELEPHONE ENCOUNTER
Paperwork sent to patient to fill out and sign. Will fax to [facility] as soon as we receive the paperwork.

## 2025-04-17 ENCOUNTER — TELEPHONE (OUTPATIENT)
Dept: NEPHROLOGY | Age: 55
End: 2025-04-17

## 2025-04-17 ENCOUNTER — TELEPHONE (OUTPATIENT)
Dept: INTERNAL MEDICINE CLINIC | Age: 55
End: 2025-04-17

## 2025-04-17 ENCOUNTER — HOSPITAL ENCOUNTER (INPATIENT)
Age: 55
LOS: 5 days | Discharge: HOME | DRG: 073 | End: 2025-04-24
Attending: STUDENT IN AN ORGANIZED HEALTH CARE EDUCATION/TRAINING PROGRAM | Admitting: NURSE PRACTITIONER
Payer: MEDICARE

## 2025-04-17 ENCOUNTER — APPOINTMENT (OUTPATIENT)
Dept: CT IMAGING | Age: 55
DRG: 073 | End: 2025-04-17
Payer: MEDICARE

## 2025-04-17 DIAGNOSIS — R63.4 WEIGHT LOSS: ICD-10-CM

## 2025-04-17 DIAGNOSIS — Z79.4 TYPE 2 DIABETES MELLITUS WITH DIABETIC NEUROPATHY, WITH LONG-TERM CURRENT USE OF INSULIN (HCC): ICD-10-CM

## 2025-04-17 DIAGNOSIS — I95.1 ORTHOSTATIC HYPOTENSION: ICD-10-CM

## 2025-04-17 DIAGNOSIS — R73.9 HYPERGLYCEMIA: ICD-10-CM

## 2025-04-17 DIAGNOSIS — E11.40 TYPE 2 DIABETES MELLITUS WITH DIABETIC NEUROPATHY, WITH LONG-TERM CURRENT USE OF INSULIN (HCC): ICD-10-CM

## 2025-04-17 DIAGNOSIS — R07.9 CHEST PAIN, UNSPECIFIED TYPE: Primary | ICD-10-CM

## 2025-04-17 DIAGNOSIS — Z91.81 AT HIGH RISK FOR FALLS: ICD-10-CM

## 2025-04-17 PROBLEM — E86.0 DEHYDRATION: Status: ACTIVE | Noted: 2025-04-17

## 2025-04-17 LAB
ALBUMIN SERPL BCG-MCNC: 4 G/DL (ref 3.4–4.9)
ALP SERPL-CCNC: 108 U/L (ref 40–129)
ALT SERPL W/O P-5'-P-CCNC: 24 U/L (ref 10–50)
ANION GAP SERPL CALC-SCNC: 14 MEQ/L (ref 8–16)
AST SERPL-CCNC: 27 U/L (ref 10–50)
B-OH-BUTYR SERPL-MSCNC: 0.96 MG/DL (ref 0.2–2.81)
BASE EXCESS BLDA CALC-SCNC: 4.7 MMOL/L (ref -2–3)
BASOPHILS ABSOLUTE: 0 THOU/MM3 (ref 0–0.1)
BASOPHILS NFR BLD AUTO: 0.3 %
BILIRUB CONJ SERPL-MCNC: 0.3 MG/DL (ref 0–0.2)
BILIRUB SERPL-MCNC: 1.1 MG/DL (ref 0.3–1.2)
BUN SERPL-MCNC: 22 MG/DL (ref 8–23)
CALCIUM SERPL-MCNC: 10.2 MG/DL (ref 8.6–10)
CHLORIDE SERPL-SCNC: 96 MEQ/L (ref 98–111)
CO2 SERPL-SCNC: 24 MEQ/L (ref 22–29)
COLLECTED BY:: ABNORMAL
CREAT SERPL-MCNC: 0.9 MG/DL (ref 0.7–1.2)
D DIMER PPP IA.FEU-MCNC: < 215 NG/ML FEU (ref 0–500)
DEPRECATED RDW RBC AUTO: 38.3 FL (ref 35–45)
DEVICE: ABNORMAL
EKG ATRIAL RATE: 89 BPM
EKG P AXIS: 37 DEGREES
EKG P-R INTERVAL: 170 MS
EKG Q-T INTERVAL: 338 MS
EKG QRS DURATION: 78 MS
EKG QTC CALCULATION (BAZETT): 411 MS
EKG R AXIS: 22 DEGREES
EKG T AXIS: 21 DEGREES
EKG VENTRICULAR RATE: 89 BPM
EOSINOPHIL NFR BLD AUTO: 2.8 %
EOSINOPHILS ABSOLUTE: 0.2 THOU/MM3 (ref 0–0.4)
ERYTHROCYTE [DISTWIDTH] IN BLOOD BY AUTOMATED COUNT: 12.8 % (ref 11.5–14.5)
FLUAV RNA RESP QL NAA+PROBE: NOT DETECTED
FLUBV RNA RESP QL NAA+PROBE: NOT DETECTED
GFR SERPL CREATININE-BSD FRML MDRD: > 90 ML/MIN/1.73M2
GLUCOSE BLD STRIP.AUTO-MCNC: 178 MG/DL (ref 70–108)
GLUCOSE BLD STRIP.AUTO-MCNC: 191 MG/DL (ref 70–108)
GLUCOSE BLD-MCNC: 227 MG/DL (ref 70–108)
GLUCOSE SERPL-MCNC: 448 MG/DL (ref 74–109)
HCO3 BLDA-SCNC: 32 MMOL/L (ref 23–28)
HCT VFR BLD AUTO: 44.4 % (ref 42–52)
HGB BLD-MCNC: 16.6 GM/DL (ref 14–18)
IMM GRANULOCYTES # BLD AUTO: 0.04 THOU/MM3 (ref 0–0.07)
IMM GRANULOCYTES NFR BLD AUTO: 0.6 %
LIPASE SERPL-CCNC: 62 U/L (ref 13–60)
LYMPHOCYTES ABSOLUTE: 1.7 THOU/MM3 (ref 1–4.8)
LYMPHOCYTES NFR BLD AUTO: 24.3 %
MAGNESIUM SERPL-MCNC: 1.6 MG/DL (ref 1.6–2.6)
MCH RBC QN AUTO: 31.2 PG (ref 26–33)
MCHC RBC AUTO-ENTMCNC: 37.4 GM/DL (ref 32.2–35.5)
MCV RBC AUTO: 83.5 FL (ref 80–94)
MONOCYTES ABSOLUTE: 0.5 THOU/MM3 (ref 0.4–1.3)
MONOCYTES NFR BLD AUTO: 6.5 %
NEUTROPHILS ABSOLUTE: 4.7 THOU/MM3 (ref 1.8–7.7)
NEUTROPHILS NFR BLD AUTO: 65.5 %
NRBC BLD AUTO-RTO: 0 /100 WBC
NT-PROBNP SERPL IA-MCNC: 44 PG/ML (ref 0–124)
OSMOLALITY SERPL CALC.SUM OF ELEC: 291 MOSMOL/KG (ref 275–300)
PCO2 TEMP ADJ BLDMV: 58 MMHG (ref 41–51)
PH BLDMV: 7.35 [PH] (ref 7.31–7.41)
PLATELET # BLD AUTO: 204 THOU/MM3 (ref 130–400)
PMV BLD AUTO: 10.6 FL (ref 9.4–12.4)
PO2 BLDMV: 17 MMHG (ref 25–40)
POTASSIUM SERPL-SCNC: 4.6 MEQ/L (ref 3.5–5.2)
PROT SERPL-MCNC: 6.8 G/DL (ref 6.4–8.3)
RBC # BLD AUTO: 5.32 MILL/MM3 (ref 4.7–6.1)
SAO2 % BLDMV: 21 %
SARS-COV-2 RNA RESP QL NAA+PROBE: NOT DETECTED
SITE: ABNORMAL
SODIUM SERPL-SCNC: 134 MEQ/L (ref 135–145)
T4 FREE SERPL-MCNC: 0.9 NG/DL (ref 0.92–1.68)
TROPONIN, HIGH SENSITIVITY: 20 NG/L (ref 0–12)
TROPONIN, HIGH SENSITIVITY: 21 NG/L (ref 0–12)
TSH SERPL DL<=0.05 MIU/L-ACNC: 1.44 UIU/ML (ref 0.27–4.2)
VENTILATION MODE VENT: ABNORMAL
WBC # BLD AUTO: 7.1 THOU/MM3 (ref 4.8–10.8)

## 2025-04-17 PROCEDURE — 83690 ASSAY OF LIPASE: CPT

## 2025-04-17 PROCEDURE — 6370000000 HC RX 637 (ALT 250 FOR IP)

## 2025-04-17 PROCEDURE — 82248 BILIRUBIN DIRECT: CPT

## 2025-04-17 PROCEDURE — 93005 ELECTROCARDIOGRAM TRACING: CPT | Performed by: STUDENT IN AN ORGANIZED HEALTH CARE EDUCATION/TRAINING PROGRAM

## 2025-04-17 PROCEDURE — 84484 ASSAY OF TROPONIN QUANT: CPT

## 2025-04-17 PROCEDURE — 82803 BLOOD GASES ANY COMBINATION: CPT

## 2025-04-17 PROCEDURE — G0378 HOSPITAL OBSERVATION PER HR: HCPCS | Performed by: NURSE PRACTITIONER

## 2025-04-17 PROCEDURE — 36415 COLL VENOUS BLD VENIPUNCTURE: CPT

## 2025-04-17 PROCEDURE — 85379 FIBRIN DEGRADATION QUANT: CPT

## 2025-04-17 PROCEDURE — G0378 HOSPITAL OBSERVATION PER HR: HCPCS

## 2025-04-17 PROCEDURE — 82010 KETONE BODYS QUAN: CPT

## 2025-04-17 PROCEDURE — 99285 EMERGENCY DEPT VISIT HI MDM: CPT

## 2025-04-17 PROCEDURE — 82947 ASSAY GLUCOSE BLOOD QUANT: CPT

## 2025-04-17 PROCEDURE — 83880 ASSAY OF NATRIURETIC PEPTIDE: CPT

## 2025-04-17 PROCEDURE — 6360000002 HC RX W HCPCS

## 2025-04-17 PROCEDURE — 85025 COMPLETE CBC W/AUTO DIFF WBC: CPT

## 2025-04-17 PROCEDURE — 6370000000 HC RX 637 (ALT 250 FOR IP): Performed by: STUDENT IN AN ORGANIZED HEALTH CARE EDUCATION/TRAINING PROGRAM

## 2025-04-17 PROCEDURE — 2500000003 HC RX 250 WO HCPCS

## 2025-04-17 PROCEDURE — 87636 SARSCOV2 & INF A&B AMP PRB: CPT

## 2025-04-17 PROCEDURE — 2580000003 HC RX 258

## 2025-04-17 PROCEDURE — 80053 COMPREHEN METABOLIC PANEL: CPT

## 2025-04-17 PROCEDURE — 83735 ASSAY OF MAGNESIUM: CPT

## 2025-04-17 PROCEDURE — 84443 ASSAY THYROID STIM HORMONE: CPT

## 2025-04-17 PROCEDURE — 6360000004 HC RX CONTRAST MEDICATION: Performed by: STUDENT IN AN ORGANIZED HEALTH CARE EDUCATION/TRAINING PROGRAM

## 2025-04-17 PROCEDURE — 74177 CT ABD & PELVIS W/CONTRAST: CPT

## 2025-04-17 PROCEDURE — 84439 ASSAY OF FREE THYROXINE: CPT

## 2025-04-17 PROCEDURE — 99223 1ST HOSP IP/OBS HIGH 75: CPT | Performed by: STUDENT IN AN ORGANIZED HEALTH CARE EDUCATION/TRAINING PROGRAM

## 2025-04-17 PROCEDURE — 82948 REAGENT STRIP/BLOOD GLUCOSE: CPT

## 2025-04-17 RX ORDER — LACOSAMIDE 50 MG/1
200 TABLET ORAL 2 TIMES DAILY
Status: DISCONTINUED | OUTPATIENT
Start: 2025-04-17 | End: 2025-04-24 | Stop reason: HOSPADM

## 2025-04-17 RX ORDER — POTASSIUM CHLORIDE 1500 MG/1
40 TABLET, EXTENDED RELEASE ORAL PRN
Status: DISCONTINUED | OUTPATIENT
Start: 2025-04-17 | End: 2025-04-24 | Stop reason: HOSPADM

## 2025-04-17 RX ORDER — SODIUM CHLORIDE 0.9 % (FLUSH) 0.9 %
5-40 SYRINGE (ML) INJECTION PRN
Status: DISCONTINUED | OUTPATIENT
Start: 2025-04-17 | End: 2025-04-24 | Stop reason: HOSPADM

## 2025-04-17 RX ORDER — INSULIN GLARGINE 100 [IU]/ML
40 INJECTION, SOLUTION SUBCUTANEOUS 2 TIMES DAILY
Status: DISCONTINUED | OUTPATIENT
Start: 2025-04-17 | End: 2025-04-18

## 2025-04-17 RX ORDER — ACETAMINOPHEN 325 MG/1
650 TABLET ORAL EVERY 6 HOURS PRN
Status: DISCONTINUED | OUTPATIENT
Start: 2025-04-17 | End: 2025-04-24 | Stop reason: HOSPADM

## 2025-04-17 RX ORDER — SODIUM CHLORIDE 0.9 % (FLUSH) 0.9 %
5-40 SYRINGE (ML) INJECTION EVERY 12 HOURS SCHEDULED
Status: DISCONTINUED | OUTPATIENT
Start: 2025-04-17 | End: 2025-04-24 | Stop reason: HOSPADM

## 2025-04-17 RX ORDER — ZOLPIDEM TARTRATE 5 MG/1
5 TABLET ORAL NIGHTLY PRN
Status: DISCONTINUED | OUTPATIENT
Start: 2025-04-17 | End: 2025-04-17

## 2025-04-17 RX ORDER — METOCLOPRAMIDE 10 MG/1
10 TABLET ORAL
Status: DISCONTINUED | OUTPATIENT
Start: 2025-04-17 | End: 2025-04-21

## 2025-04-17 RX ORDER — ROPINIROLE 1 MG/1
1 TABLET, FILM COATED ORAL 3 TIMES DAILY
Status: DISCONTINUED | OUTPATIENT
Start: 2025-04-17 | End: 2025-04-24 | Stop reason: HOSPADM

## 2025-04-17 RX ORDER — GABAPENTIN 600 MG/1
600 TABLET ORAL 3 TIMES DAILY
Status: DISCONTINUED | OUTPATIENT
Start: 2025-04-17 | End: 2025-04-24 | Stop reason: HOSPADM

## 2025-04-17 RX ORDER — 0.9 % SODIUM CHLORIDE 0.9 %
1000 INTRAVENOUS SOLUTION INTRAVENOUS ONCE
Status: COMPLETED | OUTPATIENT
Start: 2025-04-17 | End: 2025-04-17

## 2025-04-17 RX ORDER — PROMETHAZINE HYDROCHLORIDE 25 MG/ML
6.25 INJECTION, SOLUTION INTRAMUSCULAR; INTRAVENOUS EVERY 6 HOURS PRN
Status: DISCONTINUED | OUTPATIENT
Start: 2025-04-17 | End: 2025-04-17 | Stop reason: ALTCHOICE

## 2025-04-17 RX ORDER — INSULIN LISPRO 100 [IU]/ML
20 INJECTION, SOLUTION INTRAVENOUS; SUBCUTANEOUS
Status: DISCONTINUED | OUTPATIENT
Start: 2025-04-17 | End: 2025-04-18

## 2025-04-17 RX ORDER — ONDANSETRON 2 MG/ML
4 INJECTION INTRAMUSCULAR; INTRAVENOUS EVERY 6 HOURS PRN
Status: DISCONTINUED | OUTPATIENT
Start: 2025-04-17 | End: 2025-04-24 | Stop reason: HOSPADM

## 2025-04-17 RX ORDER — INSULIN LISPRO 100 [IU]/ML
0-8 INJECTION, SOLUTION INTRAVENOUS; SUBCUTANEOUS
Status: DISCONTINUED | OUTPATIENT
Start: 2025-04-17 | End: 2025-04-18

## 2025-04-17 RX ORDER — DEXTROSE MONOHYDRATE 100 MG/ML
INJECTION, SOLUTION INTRAVENOUS CONTINUOUS PRN
Status: DISCONTINUED | OUTPATIENT
Start: 2025-04-17 | End: 2025-04-24 | Stop reason: HOSPADM

## 2025-04-17 RX ORDER — POTASSIUM CHLORIDE 7.45 MG/ML
10 INJECTION INTRAVENOUS PRN
Status: DISCONTINUED | OUTPATIENT
Start: 2025-04-17 | End: 2025-04-24 | Stop reason: HOSPADM

## 2025-04-17 RX ORDER — OXYCODONE AND ACETAMINOPHEN 5; 325 MG/1; MG/1
2 TABLET ORAL EVERY 8 HOURS PRN
Refills: 0 | Status: DISCONTINUED | OUTPATIENT
Start: 2025-04-17 | End: 2025-04-24 | Stop reason: HOSPADM

## 2025-04-17 RX ORDER — POLYETHYLENE GLYCOL 3350 17 G/17G
17 POWDER, FOR SOLUTION ORAL DAILY PRN
Status: DISCONTINUED | OUTPATIENT
Start: 2025-04-17 | End: 2025-04-24 | Stop reason: HOSPADM

## 2025-04-17 RX ORDER — SODIUM CHLORIDE 9 MG/ML
INJECTION, SOLUTION INTRAVENOUS CONTINUOUS
Status: ACTIVE | OUTPATIENT
Start: 2025-04-17 | End: 2025-04-18

## 2025-04-17 RX ORDER — ACETAMINOPHEN 650 MG/1
650 SUPPOSITORY RECTAL EVERY 6 HOURS PRN
Status: DISCONTINUED | OUTPATIENT
Start: 2025-04-17 | End: 2025-04-24 | Stop reason: HOSPADM

## 2025-04-17 RX ORDER — PANTOPRAZOLE SODIUM 40 MG/1
40 TABLET, DELAYED RELEASE ORAL 2 TIMES DAILY
Status: DISCONTINUED | OUTPATIENT
Start: 2025-04-17 | End: 2025-04-24 | Stop reason: HOSPADM

## 2025-04-17 RX ORDER — OXYCODONE AND ACETAMINOPHEN 5; 325 MG/1; MG/1
1 TABLET ORAL EVERY 8 HOURS PRN
Refills: 0 | Status: DISCONTINUED | OUTPATIENT
Start: 2025-04-17 | End: 2025-04-24 | Stop reason: HOSPADM

## 2025-04-17 RX ORDER — DULOXETIN HYDROCHLORIDE 30 MG/1
30 CAPSULE, DELAYED RELEASE ORAL DAILY
Status: DISCONTINUED | OUTPATIENT
Start: 2025-04-18 | End: 2025-04-17 | Stop reason: SDUPTHER

## 2025-04-17 RX ORDER — SODIUM CHLORIDE 9 MG/ML
INJECTION, SOLUTION INTRAVENOUS PRN
Status: DISCONTINUED | OUTPATIENT
Start: 2025-04-17 | End: 2025-04-24 | Stop reason: HOSPADM

## 2025-04-17 RX ORDER — PROMETHAZINE HYDROCHLORIDE 6.25 MG/5ML
6.25 SYRUP ORAL EVERY 6 HOURS PRN
Status: DISCONTINUED | OUTPATIENT
Start: 2025-04-17 | End: 2025-04-24 | Stop reason: HOSPADM

## 2025-04-17 RX ORDER — OXCARBAZEPINE 300 MG/1
150 TABLET, FILM COATED ORAL 2 TIMES DAILY
Status: DISCONTINUED | OUTPATIENT
Start: 2025-04-17 | End: 2025-04-24 | Stop reason: HOSPADM

## 2025-04-17 RX ORDER — SUCRALFATE 1 G/1
1 TABLET ORAL 3 TIMES DAILY
Status: DISCONTINUED | OUTPATIENT
Start: 2025-04-17 | End: 2025-04-24 | Stop reason: HOSPADM

## 2025-04-17 RX ORDER — HYDRALAZINE HYDROCHLORIDE 20 MG/ML
5 INJECTION INTRAMUSCULAR; INTRAVENOUS EVERY 4 HOURS PRN
Status: DISCONTINUED | OUTPATIENT
Start: 2025-04-17 | End: 2025-04-24 | Stop reason: HOSPADM

## 2025-04-17 RX ORDER — IOPAMIDOL 755 MG/ML
80 INJECTION, SOLUTION INTRAVASCULAR
Status: COMPLETED | OUTPATIENT
Start: 2025-04-17 | End: 2025-04-17

## 2025-04-17 RX ORDER — GLUCAGON 1 MG/ML
1 KIT INJECTION PRN
Status: DISCONTINUED | OUTPATIENT
Start: 2025-04-17 | End: 2025-04-24 | Stop reason: HOSPADM

## 2025-04-17 RX ORDER — CHOLESTYRAMINE LIGHT 4 G/5.7G
4 POWDER, FOR SUSPENSION ORAL DAILY
Status: DISCONTINUED | OUTPATIENT
Start: 2025-04-18 | End: 2025-04-24 | Stop reason: HOSPADM

## 2025-04-17 RX ORDER — ENOXAPARIN SODIUM 100 MG/ML
30 INJECTION SUBCUTANEOUS 2 TIMES DAILY
Status: DISCONTINUED | OUTPATIENT
Start: 2025-04-17 | End: 2025-04-24 | Stop reason: HOSPADM

## 2025-04-17 RX ORDER — ONDANSETRON 4 MG/1
4 TABLET, ORALLY DISINTEGRATING ORAL EVERY 8 HOURS PRN
Status: DISCONTINUED | OUTPATIENT
Start: 2025-04-17 | End: 2025-04-24 | Stop reason: HOSPADM

## 2025-04-17 RX ORDER — MAGNESIUM SULFATE IN WATER 40 MG/ML
2000 INJECTION, SOLUTION INTRAVENOUS PRN
Status: DISCONTINUED | OUTPATIENT
Start: 2025-04-17 | End: 2025-04-24 | Stop reason: HOSPADM

## 2025-04-17 RX ORDER — ZOLPIDEM TARTRATE 5 MG/1
10 TABLET ORAL NIGHTLY PRN
Status: DISCONTINUED | OUTPATIENT
Start: 2025-04-17 | End: 2025-04-24 | Stop reason: HOSPADM

## 2025-04-17 RX ADMIN — OXCARBAZEPINE 150 MG: 300 TABLET, FILM COATED ORAL at 21:24

## 2025-04-17 RX ADMIN — METOCLOPRAMIDE 10 MG: 10 TABLET ORAL at 23:44

## 2025-04-17 RX ADMIN — LACOSAMIDE 200 MG: 50 TABLET, FILM COATED ORAL at 21:27

## 2025-04-17 RX ADMIN — ENOXAPARIN SODIUM 30 MG: 100 INJECTION SUBCUTANEOUS at 21:22

## 2025-04-17 RX ADMIN — OXYCODONE AND ACETAMINOPHEN 2 TABLET: 5; 325 TABLET ORAL at 19:44

## 2025-04-17 RX ADMIN — GABAPENTIN 600 MG: 600 TABLET, FILM COATED ORAL at 21:24

## 2025-04-17 RX ADMIN — INSULIN LISPRO 20 UNITS: 100 INJECTION, SOLUTION INTRAVENOUS; SUBCUTANEOUS at 18:40

## 2025-04-17 RX ADMIN — INSULIN LISPRO 2 UNITS: 100 INJECTION, SOLUTION INTRAVENOUS; SUBCUTANEOUS at 21:22

## 2025-04-17 RX ADMIN — SODIUM CHLORIDE 1000 ML: 0.9 INJECTION, SOLUTION INTRAVENOUS at 16:32

## 2025-04-17 RX ADMIN — PANTOPRAZOLE SODIUM 40 MG: 40 TABLET, DELAYED RELEASE ORAL at 21:27

## 2025-04-17 RX ADMIN — IOPAMIDOL 80 ML: 755 INJECTION, SOLUTION INTRAVENOUS at 17:50

## 2025-04-17 RX ADMIN — ROPINIROLE HYDROCHLORIDE 1 MG: 1 TABLET, FILM COATED ORAL at 21:24

## 2025-04-17 RX ADMIN — SODIUM CHLORIDE, PRESERVATIVE FREE 10 ML: 5 INJECTION INTRAVENOUS at 21:22

## 2025-04-17 RX ADMIN — INSULIN GLARGINE 40 UNITS: 100 INJECTION, SOLUTION SUBCUTANEOUS at 21:22

## 2025-04-17 RX ADMIN — SODIUM CHLORIDE 1000 ML: 0.9 INJECTION, SOLUTION INTRAVENOUS at 13:21

## 2025-04-17 RX ADMIN — Medication 6.25 MG: at 21:24

## 2025-04-17 RX ADMIN — ZOLPIDEM TARTRATE 5 MG: 5 TABLET, FILM COATED ORAL at 21:22

## 2025-04-17 RX ADMIN — SODIUM CHLORIDE: 0.9 INJECTION, SOLUTION INTRAVENOUS at 18:33

## 2025-04-17 ASSESSMENT — PAIN - FUNCTIONAL ASSESSMENT
PAIN_FUNCTIONAL_ASSESSMENT: 0-10

## 2025-04-17 ASSESSMENT — PAIN SCALES - GENERAL
PAINLEVEL_OUTOF10: 3
PAINLEVEL_OUTOF10: 7
PAINLEVEL_OUTOF10: 3
PAINLEVEL_OUTOF10: 6

## 2025-04-17 ASSESSMENT — HEART SCORE: ECG: NORMAL

## 2025-04-17 ASSESSMENT — PAIN DESCRIPTION - LOCATION
LOCATION: ABDOMEN
LOCATION: CHEST

## 2025-04-17 NOTE — TELEPHONE ENCOUNTER
Patient called and left message on nurse voicemail that he is having some chest pain and BP and pulse fluctuating . Called patient back and no answer , left message for patient that he needs to go to ER for the chest pain immediately . Dr. Hutson is out of the office today .

## 2025-04-17 NOTE — ED NOTES
ED to inpatient nurses report      Chief Complaint:  Chief Complaint   Patient presents with    Chest Pain     Present to ED from: home    MOA:     LOC: alert and orientated to name, place, date  Mobility: 1 assist  Oxygen Baseline: room air    Current needs required: room air     Code Status:   Prior    What abnormal results were found and what did you give/do to treat them? Chest pain  Any procedures or intervention occur? Labs, admission    Mental Status:  Level of Consciousness: Alert (0)    Psych Assessment:        Vitals:  Patient Vitals for the past 24 hrs:   BP Temp Temp src Pulse Resp SpO2 Height Weight   04/17/25 1549 (!) 144/86 -- -- 81 18 99 % -- --   04/17/25 1433 119/79 -- -- 79 18 100 % -- --   04/17/25 1335 125/77 -- -- 82 18 99 % -- --   04/17/25 1145 (!) 156/94 97.2 °F (36.2 °C) Oral 90 18 97 % 1.905 m (6' 3\") 115.2 kg (254 lb)        LDAs:   Peripheral IV 04/17/25 Right Antecubital (Active)   Site Assessment Clean, dry & intact 04/17/25 1633   Line Status Normal saline locked 04/17/25 1633   Dressing Status Clean, dry & intact 04/17/25 1633       Ambulatory Status:  No data recorded    Diagnosis:  DISPOSITION Admitted 04/17/2025 04:10:33 PM   Final diagnoses:   Chest pain, unspecified type   Hyperglycemia   Weight loss   At high risk for falls   Orthostatic hypotension        Consults:  None     Pain Score:  Pain Assessment  Pain Assessment: 0-10  Pain Level: 3  Pain Location: Chest    C-SSRS:   Risk of Suicide: No Risk    Sepsis Screening:       Dante Fall Risk:       Swallow Screening        Preferred Language:   English      ALLERGIES     Ciprofloxacin-ciproflox hcl er, Metformin, Niacin and related, Tramadol hcl, Ultram [tramadol], Warfarin, Other, Vancomycin hcl, and Trulicity [dulaglutide]    SURGICAL HISTORY       Past Surgical History:   Procedure Laterality Date    CARDIAC CATHETERIZATION      2011,5-6 years ago    CARDIAC CATHETERIZATION  03/2012    CARDIAC CATHETERIZATION  04/28/2016

## 2025-04-17 NOTE — ED TRIAGE NOTES
Pt presents to the ED for chest pain, increased hr and low bp. Pt denies taking medication for the pain.

## 2025-04-17 NOTE — H&P
Medicine Admission History & Physical      Patient:  Nicolas Mackenzie  YOB: 1970  Date of Service: 4/17/2025  MRN: [id number]   Acct: [id number]   Primary Care Physician: Kesha Hutson MD    Admitting Faculty MD: Krishan Heaton DO    Code Status: Prior No additional code details    Date of Service: Pt seen/examined in consultation on 4/17/2025     Assessment / Plan     Briefly, pt Nicolas Mackenzie is a 54 y.o. male with a PMH of IDDM 2, hypertension, hyperlipidemia, MDD/AVERY, RLS, GERD, seizures, insomnia, vitamin D deficiency, gastroparesis who presented with chest pressure and diaphoresis.  Reported 50 pound weight loss in 3 months 2/2 decreased p.o. intake with underlying gastroparesis.  Found to have orthostatic hypotension in ED.  Admitted for further evaluation    #Orthostatic hypotension: In setting of decreased p.o. intake with chronic gastroparesis.  Has not had good intake for 3 months now.  Has lost approximately 50 pounds over this time period. Reports he has not eaten in 2 days due to abdominal pain and vomiting 2/2 gastroparesis.Will continue w/ IV fluids overnight and recheck orthostats tomorrow.  #Chest pain: Substernal, nonradiating.  Associate with diaphoresis. No aggravating or alleviating factors. EKG NSR with no acute ST/T wave changes.  Troponin mildly elevated but flat.  Echo 9/2024 with normal systolic and diastolic function without significant valvular abnormalities.  Last LHC at [facility] 6/2022 with no significant CAD. Will keep on telemetry, trend troponin, prn EKG for CP, and repeat EKG tomorrow to assess for changes.   #Gastroparesis: Follows at [x+1]. Gastric emptying study 2/25/25 at [facility] confirmed diagnosis. On nasal spray Gimoti that he receives from his GI provider. Did not tolerate oral medications very well and didn't seem to work. States nasal spray isnt working either, was referred to a clinic in [place] by GI doctor.  Given his weight loss,

## 2025-04-17 NOTE — TELEPHONE ENCOUNTER
Patient  asking Dr. Robles to order an EKG. Patient reports chest pain of a level 5 on scale of 1-10. States his BP has been \"dropping\" Evening time yesterday it was 87/59, P- 145. He reports seeing cardiology for full work up many years ago. I do see an EKG report from 2/2025 ordered by his PCP and Dr. Valdez reviewed. This was WNL. He states he has gastroparesis and is wondering if this is causing his chest pain. He has lost 50 lbs over past 3 months. I called Dr. Robles. He does not want to order an EKG and states this should be ordered by PCP or cardiology; but he advises patient to go to ED immediately. Patient informed. He does not want to go to ED due to a \"high bill.\" I explained that as we understand his concerns with cost, it is vital that he go to ED immediately.

## 2025-04-17 NOTE — ED NOTES
Pt to be transferred to [facility]. Spoke to floor prior to transfer. Pt is in stable condition.

## 2025-04-17 NOTE — ED PROVIDER NOTES
Disp: 450 each, Rfl: 3    insulin glargine (BASAGLAR KWIKPEN) 100 UNIT/ML injection pen, Inject 45 Units into the skin 2 times daily (Patient taking differently: Inject 80 Units into the skin 2 times daily), Disp: 30 mL, Rfl: 2    DULoxetine (CYMBALTA) 60 MG extended release capsule, Take 90 mg by mouth daily, Disp: , Rfl:     Previous Medications    ACETONE, URINE, TEST STRIP    Use to test urine for ketones if glucose is elevated    ALCOHOL SWABS (ALCOHOL PADS) 70 % PADS    Use to check blood sugar 4x daily    ATOMOXETINE (STRATTERA) 10 MG CAPSULE    Take 1 tab po daily upon waking, take 1 tab po 6 hours later.    BLOOD GLUCOSE MONITOR STRIPS    Use to test blood sugar 4x daily. E11.65 Dispense brand preferred by patient insurance    COLESTIPOL (COLESTID) 1 G TABLET    Take 1 tablet by mouth 2 times daily    DULOXETINE (CYMBALTA) 30 MG EXTENDED RELEASE CAPSULE    Take 1 capsule by mouth daily Takes with a 60 mg. Size + 90 mg daily    DULOXETINE (CYMBALTA) 60 MG EXTENDED RELEASE CAPSULE    Take 90 mg by mouth daily    GABAPENTIN (NEURONTIN) 600 MG TABLET    Take 1 tablet by mouth 3 times daily for 90 days.    GLUCOSE MONITORING KIT    Use to test blood sugar E11.65 Dispense brand preferred by patient insurance    INSULIN GLARGINE (BASAGLAR KWIKPEN) 100 UNIT/ML INJECTION PEN    Inject 45 Units into the skin 2 times daily    INSULIN GLARGINE (LANTUS SOLOSTAR) 100 UNIT/ML INJECTION PEN    Inject 90 Units into the skin 2 times daily Alternative to Basaglar    INSULIN LISPRO, 1 UNIT DIAL, (HUMALOG KWIKPEN) 100 UNIT/ML SOPN    Use 30-40 units with meals plus group 4 sliding scale / increments of 4 units. TDD up to 168    INSULIN PEN NEEDLE 32G X 4 MM MISC    Use 5 times daily with insulin pens    LACOSAMIDE (VIMPAT) 200 MG TABLET    Take 1 tablet by mouth 2 times daily.    LANCETS MISC    Use to test blood sugar 4 times a day DX:E11.65    LOSARTAN (COZAAR) 100 MG TABLET    Take 1 tablet by mouth daily    METOCLOPRAMIDE

## 2025-04-18 PROBLEM — E43 SEVERE MALNUTRITION: Status: ACTIVE | Noted: 2025-04-18

## 2025-04-18 PROBLEM — K31.84 GASTROPARESIS: Status: ACTIVE | Noted: 2025-04-18

## 2025-04-18 LAB
ANION GAP SERPL CALC-SCNC: 9 MEQ/L (ref 8–16)
BUN SERPL-MCNC: 17 MG/DL (ref 8–23)
CALCIUM SERPL-MCNC: 8.7 MG/DL (ref 8.6–10)
CHLORIDE SERPL-SCNC: 106 MEQ/L (ref 98–111)
CO2 SERPL-SCNC: 24 MEQ/L (ref 22–29)
CREAT SERPL-MCNC: 0.8 MG/DL (ref 0.7–1.2)
DEPRECATED MEAN GLUCOSE BLD GHB EST-ACNC: 261 MG/DL (ref 70–126)
DEPRECATED RDW RBC AUTO: 42 FL (ref 35–45)
EKG ATRIAL RATE: 62 BPM
EKG P AXIS: 14 DEGREES
EKG P-R INTERVAL: 186 MS
EKG Q-T INTERVAL: 414 MS
EKG QRS DURATION: 82 MS
EKG QTC CALCULATION (BAZETT): 420 MS
EKG R AXIS: -5 DEGREES
EKG T AXIS: 21 DEGREES
EKG VENTRICULAR RATE: 62 BPM
ERYTHROCYTE [DISTWIDTH] IN BLOOD BY AUTOMATED COUNT: 13.1 % (ref 11.5–14.5)
GFR SERPL CREATININE-BSD FRML MDRD: > 90 ML/MIN/1.73M2
GLUCOSE BLD STRIP.AUTO-MCNC: 129 MG/DL (ref 70–108)
GLUCOSE BLD STRIP.AUTO-MCNC: 215 MG/DL (ref 70–108)
GLUCOSE BLD STRIP.AUTO-MCNC: 224 MG/DL (ref 70–108)
GLUCOSE BLD STRIP.AUTO-MCNC: 252 MG/DL (ref 70–108)
GLUCOSE BLD STRIP.AUTO-MCNC: 62 MG/DL (ref 70–108)
GLUCOSE BLD STRIP.AUTO-MCNC: 73 MG/DL (ref 70–108)
GLUCOSE SERPL-MCNC: 243 MG/DL (ref 74–109)
HBA1C MFR BLD HPLC: 10.7 % (ref 4–6)
HCT VFR BLD AUTO: 41 % (ref 42–52)
HGB BLD-MCNC: 14.4 GM/DL (ref 14–18)
MAGNESIUM SERPL-MCNC: 1.7 MG/DL (ref 1.6–2.6)
MCH RBC QN AUTO: 31.2 PG (ref 26–33)
MCHC RBC AUTO-ENTMCNC: 35.1 GM/DL (ref 32.2–35.5)
MCV RBC AUTO: 88.9 FL (ref 80–94)
PLATELET # BLD AUTO: 150 THOU/MM3 (ref 130–400)
PMV BLD AUTO: 10.6 FL (ref 9.4–12.4)
POTASSIUM SERPL-SCNC: 4.2 MEQ/L (ref 3.5–5.2)
RBC # BLD AUTO: 4.61 MILL/MM3 (ref 4.7–6.1)
SODIUM SERPL-SCNC: 139 MEQ/L (ref 135–145)
TROPONIN, HIGH SENSITIVITY: 15 NG/L (ref 0–12)
WBC # BLD AUTO: 6.1 THOU/MM3 (ref 4.8–10.8)

## 2025-04-18 PROCEDURE — 99232 SBSQ HOSP IP/OBS MODERATE 35: CPT | Performed by: NURSE PRACTITIONER

## 2025-04-18 PROCEDURE — 83036 HEMOGLOBIN GLYCOSYLATED A1C: CPT

## 2025-04-18 PROCEDURE — 6360000002 HC RX W HCPCS

## 2025-04-18 PROCEDURE — 80048 BASIC METABOLIC PNL TOTAL CA: CPT

## 2025-04-18 PROCEDURE — G0378 HOSPITAL OBSERVATION PER HR: HCPCS

## 2025-04-18 PROCEDURE — 84484 ASSAY OF TROPONIN QUANT: CPT

## 2025-04-18 PROCEDURE — 6370000000 HC RX 637 (ALT 250 FOR IP)

## 2025-04-18 PROCEDURE — 2580000003 HC RX 258: Performed by: NURSE PRACTITIONER

## 2025-04-18 PROCEDURE — 36415 COLL VENOUS BLD VENIPUNCTURE: CPT

## 2025-04-18 PROCEDURE — 2500000003 HC RX 250 WO HCPCS

## 2025-04-18 PROCEDURE — 6370000000 HC RX 637 (ALT 250 FOR IP): Performed by: STUDENT IN AN ORGANIZED HEALTH CARE EDUCATION/TRAINING PROGRAM

## 2025-04-18 PROCEDURE — 83735 ASSAY OF MAGNESIUM: CPT

## 2025-04-18 PROCEDURE — 6360000002 HC RX W HCPCS: Performed by: NURSE PRACTITIONER

## 2025-04-18 PROCEDURE — 93005 ELECTROCARDIOGRAM TRACING: CPT

## 2025-04-18 PROCEDURE — 99222 1ST HOSP IP/OBS MODERATE 55: CPT | Performed by: INTERNAL MEDICINE

## 2025-04-18 PROCEDURE — 99223 1ST HOSP IP/OBS HIGH 75: CPT | Performed by: NUCLEAR MEDICINE

## 2025-04-18 PROCEDURE — 82948 REAGENT STRIP/BLOOD GLUCOSE: CPT

## 2025-04-18 PROCEDURE — 85027 COMPLETE CBC AUTOMATED: CPT

## 2025-04-18 RX ORDER — INSULIN LISPRO 100 [IU]/ML
26 INJECTION, SOLUTION INTRAVENOUS; SUBCUTANEOUS
Status: DISCONTINUED | OUTPATIENT
Start: 2025-04-18 | End: 2025-04-21

## 2025-04-18 RX ORDER — INSULIN LISPRO 100 [IU]/ML
0-16 INJECTION, SOLUTION INTRAVENOUS; SUBCUTANEOUS
Status: DISCONTINUED | OUTPATIENT
Start: 2025-04-18 | End: 2025-04-24 | Stop reason: HOSPADM

## 2025-04-18 RX ORDER — INSULIN GLARGINE 100 [IU]/ML
50 INJECTION, SOLUTION SUBCUTANEOUS 2 TIMES DAILY
Status: DISCONTINUED | OUTPATIENT
Start: 2025-04-18 | End: 2025-04-21

## 2025-04-18 RX ORDER — DEXAMETHASONE 0.5 MG/1
1 TABLET ORAL ONCE
Status: COMPLETED | OUTPATIENT
Start: 2025-04-18 | End: 2025-04-18

## 2025-04-18 RX ORDER — DEXAMETHASONE 0.5 MG/1
1 TABLET ORAL ONCE
Status: DISCONTINUED | OUTPATIENT
Start: 2025-04-19 | End: 2025-04-18

## 2025-04-18 RX ADMIN — AZITHROMYCIN MONOHYDRATE 500 MG: 500 INJECTION, POWDER, LYOPHILIZED, FOR SOLUTION INTRAVENOUS at 14:33

## 2025-04-18 RX ADMIN — GABAPENTIN 600 MG: 600 TABLET, FILM COATED ORAL at 08:45

## 2025-04-18 RX ADMIN — INSULIN GLARGINE 50 UNITS: 100 INJECTION, SOLUTION SUBCUTANEOUS at 20:07

## 2025-04-18 RX ADMIN — LACOSAMIDE 200 MG: 50 TABLET, FILM COATED ORAL at 20:07

## 2025-04-18 RX ADMIN — ONDANSETRON 4 MG: 2 INJECTION, SOLUTION INTRAMUSCULAR; INTRAVENOUS at 12:42

## 2025-04-18 RX ADMIN — GABAPENTIN 600 MG: 600 TABLET, FILM COATED ORAL at 14:08

## 2025-04-18 RX ADMIN — METOCLOPRAMIDE 10 MG: 10 TABLET ORAL at 16:31

## 2025-04-18 RX ADMIN — METOCLOPRAMIDE 10 MG: 10 TABLET ORAL at 08:48

## 2025-04-18 RX ADMIN — Medication 6.25 MG: at 14:09

## 2025-04-18 RX ADMIN — ENOXAPARIN SODIUM 30 MG: 100 INJECTION SUBCUTANEOUS at 20:07

## 2025-04-18 RX ADMIN — INSULIN LISPRO 6 UNITS: 100 INJECTION, SOLUTION INTRAVENOUS; SUBCUTANEOUS at 12:40

## 2025-04-18 RX ADMIN — PANTOPRAZOLE SODIUM 40 MG: 40 TABLET, DELAYED RELEASE ORAL at 20:07

## 2025-04-18 RX ADMIN — ROPINIROLE HYDROCHLORIDE 1 MG: 1 TABLET, FILM COATED ORAL at 20:08

## 2025-04-18 RX ADMIN — OXCARBAZEPINE 150 MG: 300 TABLET, FILM COATED ORAL at 20:07

## 2025-04-18 RX ADMIN — INSULIN LISPRO 6 UNITS: 100 INJECTION, SOLUTION INTRAVENOUS; SUBCUTANEOUS at 20:07

## 2025-04-18 RX ADMIN — OXCARBAZEPINE 150 MG: 300 TABLET, FILM COATED ORAL at 08:45

## 2025-04-18 RX ADMIN — SODIUM CHLORIDE, PRESERVATIVE FREE 10 ML: 5 INJECTION INTRAVENOUS at 20:08

## 2025-04-18 RX ADMIN — INSULIN LISPRO 20 UNITS: 100 INJECTION, SOLUTION INTRAVENOUS; SUBCUTANEOUS at 08:47

## 2025-04-18 RX ADMIN — ROPINIROLE HYDROCHLORIDE 1 MG: 1 TABLET, FILM COATED ORAL at 08:45

## 2025-04-18 RX ADMIN — INSULIN LISPRO 26 UNITS: 100 INJECTION, SOLUTION INTRAVENOUS; SUBCUTANEOUS at 12:40

## 2025-04-18 RX ADMIN — ENOXAPARIN SODIUM 30 MG: 100 INJECTION SUBCUTANEOUS at 08:44

## 2025-04-18 RX ADMIN — METOCLOPRAMIDE 10 MG: 10 TABLET ORAL at 20:07

## 2025-04-18 RX ADMIN — DEXAMETHASONE 1 MG: 0.5 TABLET ORAL at 21:27

## 2025-04-18 RX ADMIN — GABAPENTIN 600 MG: 600 TABLET, FILM COATED ORAL at 20:07

## 2025-04-18 RX ADMIN — SODIUM CHLORIDE, PRESERVATIVE FREE 10 ML: 5 INJECTION INTRAVENOUS at 08:50

## 2025-04-18 RX ADMIN — ROPINIROLE HYDROCHLORIDE 1 MG: 1 TABLET, FILM COATED ORAL at 14:07

## 2025-04-18 RX ADMIN — OXYCODONE AND ACETAMINOPHEN 2 TABLET: 5; 325 TABLET ORAL at 08:44

## 2025-04-18 RX ADMIN — DULOXETINE 90 MG: 60 CAPSULE, DELAYED RELEASE ORAL at 08:45

## 2025-04-18 RX ADMIN — ZOLPIDEM TARTRATE 10 MG: 5 TABLET, FILM COATED ORAL at 20:09

## 2025-04-18 RX ADMIN — Medication 6.25 MG: at 08:48

## 2025-04-18 RX ADMIN — LACOSAMIDE 200 MG: 50 TABLET, FILM COATED ORAL at 08:44

## 2025-04-18 RX ADMIN — INSULIN LISPRO 4 UNITS: 100 INJECTION, SOLUTION INTRAVENOUS; SUBCUTANEOUS at 08:46

## 2025-04-18 RX ADMIN — PANTOPRAZOLE SODIUM 40 MG: 40 TABLET, DELAYED RELEASE ORAL at 08:44

## 2025-04-18 RX ADMIN — ACETAMINOPHEN 650 MG: 325 TABLET ORAL at 00:59

## 2025-04-18 RX ADMIN — INSULIN GLARGINE 40 UNITS: 100 INJECTION, SOLUTION SUBCUTANEOUS at 08:46

## 2025-04-18 RX ADMIN — METOCLOPRAMIDE 10 MG: 10 TABLET ORAL at 04:24

## 2025-04-18 ASSESSMENT — PAIN SCALES - GENERAL
PAINLEVEL_OUTOF10: 7
PAINLEVEL_OUTOF10: 7
PAINLEVEL_OUTOF10: 3
PAINLEVEL_OUTOF10: 5
PAINLEVEL_OUTOF10: 7
PAINLEVEL_OUTOF10: 6

## 2025-04-18 ASSESSMENT — PAIN DESCRIPTION - LOCATION: LOCATION: ABDOMEN

## 2025-04-18 ASSESSMENT — PAIN DESCRIPTION - DESCRIPTORS: DESCRIPTORS: DISCOMFORT

## 2025-04-18 NOTE — PROCEDURES
PROCEDURE NOTE  Date: 4/18/2025   Name: Nicolas Mackenzie  YOB: 1970    Procedures:   EKG Completed. Handed to RN.

## 2025-04-18 NOTE — FLOWSHEET NOTE
04/18/25 1716 04/18/25 1723   Treatment Team Notification   Reason for Communication Medication concern  (Low glucose) Medication concern  (Low glucose)   Name of Team Member Notified Farzad HALE   Treatment Team Role Consulting Provider  (endocrine) Attending Provider   Method of Communication Secure Message Secure Message   Response Waiting for response See orders;Other (Comment)   Notification Time 1717 1723     Dorothy NP made aware of hypoglycemia, verbal orders to hold meal bolus insulin. This RN spoke with Dr. Sandra,  Advised to recheck glucose prior to meal, stated he is in route to unit to see patient.

## 2025-04-18 NOTE — CARE COORDINATION
Case Management Assessment Initial Evaluation    Date/Time of Evaluation: 2025 2:29 PM  Assessment Completed by: Leticia Diaz RN    If patient is discharged prior to next notation, then this note serves as note for discharge by case management.    Patient Name: Nicolas Mackenzie                   YOB: 1970  Diagnosis: Orthostatic hypotension [I95.1]  Dehydration [E86.0]  Weight loss [R63.4]  Hyperglycemia [R73.9]  At high risk for falls [Z91.81]  Chest pain, unspecified type [R07.9]                   Date / Time: 2025 11:41 AM  Location: 8A-/020-A     Patient Admission Status: Observation   Readmission Risk Low 0-14, Mod 15-19), High > 20: Readmission Risk Score: 12.5    Current PCP: eKsha Hutson MD  Health Care Decision Makers:     Additional Case Management Notes: Admitted from ER as observation with chest pain. Endocrinology and Cardiology consulted. Hx Gastroparesis. + Orthostatic vitals in ER. IV Zithromax for gastroparesis.     Procedures: none    Imagin/18 CT Abd/Plv W: 1. No acute findings. Appendix is normal.   2. Colonic and duodenal diverticula.   3. Nonobstructing right renal stone.   4. Nonspecific mild splenomegaly.     Patient Goals/Plan/Treatment Preferences: Planning to return home with his wife, daughter, and grandchildren. Has walker, cane, and wheelchair at home. [facility] with [facility]. Denies additional needs at this time.        25 1427   Service Assessment   Patient Orientation Alert and Oriented   Cognition Alert   History Provided By Patient   Primary Caregiver Self   Accompanied By/Relationship alone   Support Systems Spouse/Significant Other;Children;Family Members   Patient's Healthcare Decision Maker is: Legal Next of Kin   PCP Verified by CM Yes   Prior Functional Level Independent in ADLs/IADLs   Current Functional Level Independent in ADLs/IADLs   Can patient return to prior living arrangement Yes   Ability to make needs known:

## 2025-04-19 ENCOUNTER — APPOINTMENT (OUTPATIENT)
Dept: CT IMAGING | Age: 55
DRG: 073 | End: 2025-04-19
Payer: MEDICARE

## 2025-04-19 LAB
ANION GAP SERPL CALC-SCNC: 7 MEQ/L (ref 8–16)
BASOPHILS ABSOLUTE: 0 THOU/MM3 (ref 0–0.1)
BASOPHILS NFR BLD AUTO: 0.3 %
BUN SERPL-MCNC: 11 MG/DL (ref 8–23)
CALCIUM SERPL-MCNC: 8.9 MG/DL (ref 8.6–10)
CHLORIDE SERPL-SCNC: 103 MEQ/L (ref 98–111)
CO2 SERPL-SCNC: 27 MEQ/L (ref 22–29)
CORTIS SERPL-MCNC: 0.7 UG/DL
CORTISOL COLLECTION INFO: NORMAL
CREAT SERPL-MCNC: 0.8 MG/DL (ref 0.7–1.2)
DEPRECATED RDW RBC AUTO: 38 FL (ref 35–45)
EOSINOPHIL NFR BLD AUTO: 1 %
EOSINOPHILS ABSOLUTE: 0.1 THOU/MM3 (ref 0–0.4)
ERYTHROCYTE [DISTWIDTH] IN BLOOD BY AUTOMATED COUNT: 12.6 % (ref 11.5–14.5)
GFR SERPL CREATININE-BSD FRML MDRD: > 90 ML/MIN/1.73M2
GLUCOSE BLD STRIP.AUTO-MCNC: 135 MG/DL (ref 70–108)
GLUCOSE BLD STRIP.AUTO-MCNC: 218 MG/DL (ref 70–108)
GLUCOSE BLD STRIP.AUTO-MCNC: 292 MG/DL (ref 70–108)
GLUCOSE BLD STRIP.AUTO-MCNC: 296 MG/DL (ref 70–108)
GLUCOSE BLD STRIP.AUTO-MCNC: 309 MG/DL (ref 70–108)
GLUCOSE BLD STRIP.AUTO-MCNC: 50 MG/DL (ref 70–108)
GLUCOSE SERPL-MCNC: 272 MG/DL (ref 74–109)
HCT VFR BLD AUTO: 39.5 % (ref 42–52)
HGB BLD-MCNC: 14.4 GM/DL (ref 14–18)
IMM GRANULOCYTES # BLD AUTO: 0.03 THOU/MM3 (ref 0–0.07)
IMM GRANULOCYTES NFR BLD AUTO: 0.5 %
LYMPHOCYTES ABSOLUTE: 1.4 THOU/MM3 (ref 1–4.8)
LYMPHOCYTES NFR BLD AUTO: 24.3 %
MCH RBC QN AUTO: 30.6 PG (ref 26–33)
MCHC RBC AUTO-ENTMCNC: 36.5 GM/DL (ref 32.2–35.5)
MCV RBC AUTO: 83.9 FL (ref 80–94)
MONOCYTES ABSOLUTE: 0.2 THOU/MM3 (ref 0.4–1.3)
MONOCYTES NFR BLD AUTO: 3.9 %
NEUTROPHILS ABSOLUTE: 4.1 THOU/MM3 (ref 1.8–7.7)
NEUTROPHILS NFR BLD AUTO: 70 %
NRBC BLD AUTO-RTO: 0 /100 WBC
PLATELET # BLD AUTO: 165 THOU/MM3 (ref 130–400)
PMV BLD AUTO: 10.4 FL (ref 9.4–12.4)
POTASSIUM SERPL-SCNC: 4.8 MEQ/L (ref 3.5–5.2)
RBC # BLD AUTO: 4.71 MILL/MM3 (ref 4.7–6.1)
SODIUM SERPL-SCNC: 137 MEQ/L (ref 135–145)
TROPONIN, HIGH SENSITIVITY: 13 NG/L (ref 0–12)
TROPONIN, HIGH SENSITIVITY: 13 NG/L (ref 0–12)
TROPONIN, HIGH SENSITIVITY: 14 NG/L (ref 0–12)
TROPONIN, HIGH SENSITIVITY: 15 NG/L (ref 0–12)
WBC # BLD AUTO: 5.8 THOU/MM3 (ref 4.8–10.8)

## 2025-04-19 PROCEDURE — 6370000000 HC RX 637 (ALT 250 FOR IP)

## 2025-04-19 PROCEDURE — 6370000000 HC RX 637 (ALT 250 FOR IP): Performed by: STUDENT IN AN ORGANIZED HEALTH CARE EDUCATION/TRAINING PROGRAM

## 2025-04-19 PROCEDURE — 82533 TOTAL CORTISOL: CPT

## 2025-04-19 PROCEDURE — 36415 COLL VENOUS BLD VENIPUNCTURE: CPT

## 2025-04-19 PROCEDURE — 1200000000 HC SEMI PRIVATE

## 2025-04-19 PROCEDURE — 80048 BASIC METABOLIC PNL TOTAL CA: CPT

## 2025-04-19 PROCEDURE — 85025 COMPLETE CBC W/AUTO DIFF WBC: CPT

## 2025-04-19 PROCEDURE — 99232 SBSQ HOSP IP/OBS MODERATE 35: CPT | Performed by: NURSE PRACTITIONER

## 2025-04-19 PROCEDURE — 82948 REAGENT STRIP/BLOOD GLUCOSE: CPT

## 2025-04-19 PROCEDURE — 82024 ASSAY OF ACTH: CPT

## 2025-04-19 PROCEDURE — 2580000003 HC RX 258: Performed by: NURSE PRACTITIONER

## 2025-04-19 PROCEDURE — 6360000002 HC RX W HCPCS: Performed by: NURSE PRACTITIONER

## 2025-04-19 PROCEDURE — 70450 CT HEAD/BRAIN W/O DYE: CPT

## 2025-04-19 PROCEDURE — 6360000002 HC RX W HCPCS

## 2025-04-19 PROCEDURE — 84484 ASSAY OF TROPONIN QUANT: CPT

## 2025-04-19 RX ORDER — SODIUM CHLORIDE 9 MG/ML
INJECTION, SOLUTION INTRAVENOUS CONTINUOUS
Status: DISCONTINUED | OUTPATIENT
Start: 2025-04-19 | End: 2025-04-24 | Stop reason: HOSPADM

## 2025-04-19 RX ADMIN — ROPINIROLE HYDROCHLORIDE 1 MG: 1 TABLET, FILM COATED ORAL at 09:07

## 2025-04-19 RX ADMIN — INSULIN LISPRO 6 UNITS: 100 INJECTION, SOLUTION INTRAVENOUS; SUBCUTANEOUS at 12:15

## 2025-04-19 RX ADMIN — INSULIN LISPRO 9 UNITS: 100 INJECTION, SOLUTION INTRAVENOUS; SUBCUTANEOUS at 09:08

## 2025-04-19 RX ADMIN — Medication 6.25 MG: at 06:22

## 2025-04-19 RX ADMIN — INSULIN GLARGINE 50 UNITS: 100 INJECTION, SOLUTION SUBCUTANEOUS at 20:27

## 2025-04-19 RX ADMIN — METOCLOPRAMIDE 10 MG: 10 TABLET ORAL at 06:23

## 2025-04-19 RX ADMIN — METOCLOPRAMIDE 10 MG: 10 TABLET ORAL at 09:07

## 2025-04-19 RX ADMIN — LACOSAMIDE 200 MG: 50 TABLET, FILM COATED ORAL at 20:28

## 2025-04-19 RX ADMIN — OXCARBAZEPINE 150 MG: 300 TABLET, FILM COATED ORAL at 09:07

## 2025-04-19 RX ADMIN — METOCLOPRAMIDE 10 MG: 10 TABLET ORAL at 15:59

## 2025-04-19 RX ADMIN — ENOXAPARIN SODIUM 30 MG: 100 INJECTION SUBCUTANEOUS at 09:08

## 2025-04-19 RX ADMIN — INSULIN LISPRO 26 UNITS: 100 INJECTION, SOLUTION INTRAVENOUS; SUBCUTANEOUS at 09:08

## 2025-04-19 RX ADMIN — ZOLPIDEM TARTRATE 10 MG: 5 TABLET, FILM COATED ORAL at 21:12

## 2025-04-19 RX ADMIN — OXYCODONE AND ACETAMINOPHEN 2 TABLET: 5; 325 TABLET ORAL at 12:14

## 2025-04-19 RX ADMIN — GABAPENTIN 600 MG: 600 TABLET, FILM COATED ORAL at 12:16

## 2025-04-19 RX ADMIN — METOCLOPRAMIDE 10 MG: 10 TABLET ORAL at 20:30

## 2025-04-19 RX ADMIN — PANTOPRAZOLE SODIUM 40 MG: 40 TABLET, DELAYED RELEASE ORAL at 09:08

## 2025-04-19 RX ADMIN — Medication 6.25 MG: at 12:15

## 2025-04-19 RX ADMIN — OXYCODONE AND ACETAMINOPHEN 2 TABLET: 5; 325 TABLET ORAL at 20:28

## 2025-04-19 RX ADMIN — GABAPENTIN 600 MG: 600 TABLET, FILM COATED ORAL at 09:07

## 2025-04-19 RX ADMIN — ROPINIROLE HYDROCHLORIDE 1 MG: 1 TABLET, FILM COATED ORAL at 20:30

## 2025-04-19 RX ADMIN — ONDANSETRON 4 MG: 2 INJECTION, SOLUTION INTRAMUSCULAR; INTRAVENOUS at 21:10

## 2025-04-19 RX ADMIN — ROPINIROLE HYDROCHLORIDE 1 MG: 1 TABLET, FILM COATED ORAL at 12:16

## 2025-04-19 RX ADMIN — ENOXAPARIN SODIUM 30 MG: 100 INJECTION SUBCUTANEOUS at 20:28

## 2025-04-19 RX ADMIN — INSULIN LISPRO 12 UNITS: 100 INJECTION, SOLUTION INTRAVENOUS; SUBCUTANEOUS at 20:28

## 2025-04-19 RX ADMIN — SODIUM CHLORIDE: 0.9 INJECTION, SOLUTION INTRAVENOUS at 13:20

## 2025-04-19 RX ADMIN — PANTOPRAZOLE SODIUM 40 MG: 40 TABLET, DELAYED RELEASE ORAL at 20:29

## 2025-04-19 RX ADMIN — INSULIN LISPRO 26 UNITS: 100 INJECTION, SOLUTION INTRAVENOUS; SUBCUTANEOUS at 12:15

## 2025-04-19 RX ADMIN — LACOSAMIDE 200 MG: 50 TABLET, FILM COATED ORAL at 09:16

## 2025-04-19 RX ADMIN — ONDANSETRON 4 MG: 2 INJECTION, SOLUTION INTRAMUSCULAR; INTRAVENOUS at 06:56

## 2025-04-19 RX ADMIN — INSULIN GLARGINE 50 UNITS: 100 INJECTION, SOLUTION SUBCUTANEOUS at 09:09

## 2025-04-19 RX ADMIN — OXCARBAZEPINE 150 MG: 300 TABLET, FILM COATED ORAL at 20:30

## 2025-04-19 RX ADMIN — DULOXETINE 90 MG: 60 CAPSULE, DELAYED RELEASE ORAL at 09:07

## 2025-04-19 RX ADMIN — CHOLESTYRAMINE 4 G: 4 POWDER, FOR SUSPENSION ORAL at 09:07

## 2025-04-19 RX ADMIN — AZITHROMYCIN MONOHYDRATE 500 MG: 500 INJECTION, POWDER, LYOPHILIZED, FOR SOLUTION INTRAVENOUS at 12:12

## 2025-04-19 RX ADMIN — GABAPENTIN 600 MG: 600 TABLET, FILM COATED ORAL at 20:30

## 2025-04-19 ASSESSMENT — PAIN SCALES - GENERAL
PAINLEVEL_OUTOF10: 6
PAINLEVEL_OUTOF10: 2

## 2025-04-19 ASSESSMENT — PAIN DESCRIPTION - LOCATION: LOCATION: ABDOMEN

## 2025-04-20 LAB
ANION GAP SERPL CALC-SCNC: 8 MEQ/L (ref 8–16)
BASOPHILS ABSOLUTE: 0 THOU/MM3 (ref 0–0.1)
BASOPHILS NFR BLD AUTO: 0.5 %
BUN SERPL-MCNC: 12 MG/DL (ref 8–23)
CALCIUM SERPL-MCNC: 8.3 MG/DL (ref 8.6–10)
CHLORIDE SERPL-SCNC: 105 MEQ/L (ref 98–111)
CO2 SERPL-SCNC: 26 MEQ/L (ref 22–29)
CREAT SERPL-MCNC: 0.8 MG/DL (ref 0.7–1.2)
DEPRECATED RDW RBC AUTO: 41.4 FL (ref 35–45)
EOSINOPHIL NFR BLD AUTO: 3.2 %
EOSINOPHILS ABSOLUTE: 0.1 THOU/MM3 (ref 0–0.4)
ERYTHROCYTE [DISTWIDTH] IN BLOOD BY AUTOMATED COUNT: 12.7 % (ref 11.5–14.5)
GFR SERPL CREATININE-BSD FRML MDRD: > 90 ML/MIN/1.73M2
GLUCOSE BLD STRIP.AUTO-MCNC: 119 MG/DL (ref 70–108)
GLUCOSE BLD STRIP.AUTO-MCNC: 123 MG/DL (ref 70–108)
GLUCOSE BLD STRIP.AUTO-MCNC: 141 MG/DL (ref 70–108)
GLUCOSE BLD STRIP.AUTO-MCNC: 213 MG/DL (ref 70–108)
GLUCOSE BLD STRIP.AUTO-MCNC: 244 MG/DL (ref 70–108)
GLUCOSE BLD STRIP.AUTO-MCNC: 374 MG/DL (ref 70–108)
GLUCOSE BLD STRIP.AUTO-MCNC: 56 MG/DL (ref 70–108)
GLUCOSE BLD STRIP.AUTO-MCNC: 61 MG/DL (ref 70–108)
GLUCOSE BLD STRIP.AUTO-MCNC: 68 MG/DL (ref 70–108)
GLUCOSE BLD STRIP.AUTO-MCNC: 92 MG/DL (ref 70–108)
GLUCOSE SERPL-MCNC: 308 MG/DL (ref 74–109)
HCT VFR BLD AUTO: 37.8 % (ref 42–52)
HGB BLD-MCNC: 13.2 GM/DL (ref 14–18)
IMM GRANULOCYTES # BLD AUTO: 0.02 THOU/MM3 (ref 0–0.07)
IMM GRANULOCYTES NFR BLD AUTO: 0.5 %
LYMPHOCYTES ABSOLUTE: 1.8 THOU/MM3 (ref 1–4.8)
LYMPHOCYTES NFR BLD AUTO: 43.5 %
MCH RBC QN AUTO: 31.1 PG (ref 26–33)
MCHC RBC AUTO-ENTMCNC: 34.9 GM/DL (ref 32.2–35.5)
MCV RBC AUTO: 88.9 FL (ref 80–94)
MONOCYTES ABSOLUTE: 0.3 THOU/MM3 (ref 0.4–1.3)
MONOCYTES NFR BLD AUTO: 6.4 %
NEUTROPHILS ABSOLUTE: 1.9 THOU/MM3 (ref 1.8–7.7)
NEUTROPHILS NFR BLD AUTO: 45.9 %
NRBC BLD AUTO-RTO: 0 /100 WBC
PLATELET # BLD AUTO: 139 THOU/MM3 (ref 130–400)
PMV BLD AUTO: 10.8 FL (ref 9.4–12.4)
POTASSIUM SERPL-SCNC: 4.4 MEQ/L (ref 3.5–5.2)
RBC # BLD AUTO: 4.25 MILL/MM3 (ref 4.7–6.1)
SODIUM SERPL-SCNC: 139 MEQ/L (ref 135–145)
TROPONIN, HIGH SENSITIVITY: 14 NG/L (ref 0–12)
TROPONIN, HIGH SENSITIVITY: 15 NG/L (ref 0–12)
TROPONIN, HIGH SENSITIVITY: 16 NG/L (ref 0–12)
WBC # BLD AUTO: 4.1 THOU/MM3 (ref 4.8–10.8)

## 2025-04-20 PROCEDURE — 1200000000 HC SEMI PRIVATE

## 2025-04-20 PROCEDURE — 84484 ASSAY OF TROPONIN QUANT: CPT

## 2025-04-20 PROCEDURE — 99232 SBSQ HOSP IP/OBS MODERATE 35: CPT | Performed by: NURSE PRACTITIONER

## 2025-04-20 PROCEDURE — 85025 COMPLETE CBC W/AUTO DIFF WBC: CPT

## 2025-04-20 PROCEDURE — 6360000002 HC RX W HCPCS: Performed by: NURSE PRACTITIONER

## 2025-04-20 PROCEDURE — 82948 REAGENT STRIP/BLOOD GLUCOSE: CPT

## 2025-04-20 PROCEDURE — 6370000000 HC RX 637 (ALT 250 FOR IP)

## 2025-04-20 PROCEDURE — 2580000003 HC RX 258: Performed by: NURSE PRACTITIONER

## 2025-04-20 PROCEDURE — 6360000002 HC RX W HCPCS

## 2025-04-20 PROCEDURE — 6370000000 HC RX 637 (ALT 250 FOR IP): Performed by: STUDENT IN AN ORGANIZED HEALTH CARE EDUCATION/TRAINING PROGRAM

## 2025-04-20 PROCEDURE — 80048 BASIC METABOLIC PNL TOTAL CA: CPT

## 2025-04-20 PROCEDURE — 36415 COLL VENOUS BLD VENIPUNCTURE: CPT

## 2025-04-20 RX ADMIN — LACOSAMIDE 200 MG: 50 TABLET, FILM COATED ORAL at 09:16

## 2025-04-20 RX ADMIN — INSULIN GLARGINE 50 UNITS: 100 INJECTION, SOLUTION SUBCUTANEOUS at 09:17

## 2025-04-20 RX ADMIN — GABAPENTIN 600 MG: 600 TABLET, FILM COATED ORAL at 09:19

## 2025-04-20 RX ADMIN — ROPINIROLE HYDROCHLORIDE 1 MG: 1 TABLET, FILM COATED ORAL at 20:56

## 2025-04-20 RX ADMIN — DULOXETINE 90 MG: 60 CAPSULE, DELAYED RELEASE ORAL at 09:18

## 2025-04-20 RX ADMIN — OXCARBAZEPINE 150 MG: 300 TABLET, FILM COATED ORAL at 20:57

## 2025-04-20 RX ADMIN — INSULIN LISPRO 26 UNITS: 100 INJECTION, SOLUTION INTRAVENOUS; SUBCUTANEOUS at 16:55

## 2025-04-20 RX ADMIN — ENOXAPARIN SODIUM 30 MG: 100 INJECTION SUBCUTANEOUS at 20:55

## 2025-04-20 RX ADMIN — OXCARBAZEPINE 150 MG: 300 TABLET, FILM COATED ORAL at 09:19

## 2025-04-20 RX ADMIN — INSULIN LISPRO 26 UNITS: 100 INJECTION, SOLUTION INTRAVENOUS; SUBCUTANEOUS at 09:17

## 2025-04-20 RX ADMIN — INSULIN GLARGINE 50 UNITS: 100 INJECTION, SOLUTION SUBCUTANEOUS at 22:12

## 2025-04-20 RX ADMIN — INSULIN LISPRO 15 UNITS: 100 INJECTION, SOLUTION INTRAVENOUS; SUBCUTANEOUS at 16:54

## 2025-04-20 RX ADMIN — ROPINIROLE HYDROCHLORIDE 1 MG: 1 TABLET, FILM COATED ORAL at 09:19

## 2025-04-20 RX ADMIN — METOCLOPRAMIDE 10 MG: 10 TABLET ORAL at 17:09

## 2025-04-20 RX ADMIN — METOCLOPRAMIDE 10 MG: 10 TABLET ORAL at 09:19

## 2025-04-20 RX ADMIN — PANTOPRAZOLE SODIUM 40 MG: 40 TABLET, DELAYED RELEASE ORAL at 20:55

## 2025-04-20 RX ADMIN — LACOSAMIDE 200 MG: 50 TABLET, FILM COATED ORAL at 20:55

## 2025-04-20 RX ADMIN — GABAPENTIN 600 MG: 600 TABLET, FILM COATED ORAL at 20:56

## 2025-04-20 RX ADMIN — METOCLOPRAMIDE 10 MG: 10 TABLET ORAL at 20:57

## 2025-04-20 RX ADMIN — ONDANSETRON 4 MG: 2 INJECTION, SOLUTION INTRAMUSCULAR; INTRAVENOUS at 16:57

## 2025-04-20 RX ADMIN — INSULIN LISPRO 6 UNITS: 100 INJECTION, SOLUTION INTRAVENOUS; SUBCUTANEOUS at 09:17

## 2025-04-20 RX ADMIN — GABAPENTIN 600 MG: 600 TABLET, FILM COATED ORAL at 13:01

## 2025-04-20 RX ADMIN — Medication 6.25 MG: at 09:16

## 2025-04-20 RX ADMIN — AZITHROMYCIN MONOHYDRATE 500 MG: 500 INJECTION, POWDER, LYOPHILIZED, FOR SOLUTION INTRAVENOUS at 13:00

## 2025-04-20 RX ADMIN — ENOXAPARIN SODIUM 30 MG: 100 INJECTION SUBCUTANEOUS at 09:18

## 2025-04-20 RX ADMIN — OXYCODONE AND ACETAMINOPHEN 2 TABLET: 5; 325 TABLET ORAL at 13:00

## 2025-04-20 RX ADMIN — PANTOPRAZOLE SODIUM 40 MG: 40 TABLET, DELAYED RELEASE ORAL at 09:16

## 2025-04-20 RX ADMIN — METOCLOPRAMIDE 10 MG: 10 TABLET ORAL at 04:35

## 2025-04-20 RX ADMIN — ROPINIROLE HYDROCHLORIDE 1 MG: 1 TABLET, FILM COATED ORAL at 13:01

## 2025-04-20 RX ADMIN — ZOLPIDEM TARTRATE 10 MG: 5 TABLET, FILM COATED ORAL at 21:02

## 2025-04-20 ASSESSMENT — PAIN SCALES - GENERAL
PAINLEVEL_OUTOF10: 7
PAINLEVEL_OUTOF10: 4

## 2025-04-20 ASSESSMENT — PAIN DESCRIPTION - LOCATION: LOCATION: ABDOMEN

## 2025-04-21 LAB
ACTH PLAS-MCNC: < 1.5 PG/ML (ref 7.2–63.3)
ANION GAP SERPL CALC-SCNC: 6 MEQ/L (ref 8–16)
BASOPHILS ABSOLUTE: 0 THOU/MM3 (ref 0–0.1)
BASOPHILS NFR BLD AUTO: 0.6 %
BUN SERPL-MCNC: 10 MG/DL (ref 8–23)
CALCIUM SERPL-MCNC: 8.5 MG/DL (ref 8.6–10)
CHLORIDE SERPL-SCNC: 105 MEQ/L (ref 98–111)
CO2 SERPL-SCNC: 31 MEQ/L (ref 22–29)
CREAT SERPL-MCNC: 0.8 MG/DL (ref 0.7–1.2)
DEPRECATED RDW RBC AUTO: 41.1 FL (ref 35–45)
EOSINOPHIL NFR BLD AUTO: 4.7 %
EOSINOPHILS ABSOLUTE: 0.3 THOU/MM3 (ref 0–0.4)
ERYTHROCYTE [DISTWIDTH] IN BLOOD BY AUTOMATED COUNT: 13.1 % (ref 11.5–14.5)
GFR SERPL CREATININE-BSD FRML MDRD: > 90 ML/MIN/1.73M2
GLUCOSE BLD STRIP.AUTO-MCNC: 120 MG/DL (ref 70–108)
GLUCOSE BLD STRIP.AUTO-MCNC: 132 MG/DL (ref 70–108)
GLUCOSE BLD STRIP.AUTO-MCNC: 190 MG/DL (ref 70–108)
GLUCOSE BLD STRIP.AUTO-MCNC: 225 MG/DL (ref 70–108)
GLUCOSE BLD STRIP.AUTO-MCNC: 88 MG/DL (ref 70–108)
GLUCOSE SERPL-MCNC: 140 MG/DL (ref 74–109)
HCT VFR BLD AUTO: 38.1 % (ref 42–52)
HGB BLD-MCNC: 13.2 GM/DL (ref 14–18)
IMM GRANULOCYTES # BLD AUTO: 0.05 THOU/MM3 (ref 0–0.07)
IMM GRANULOCYTES NFR BLD AUTO: 0.9 %
LYMPHOCYTES ABSOLUTE: 2 THOU/MM3 (ref 1–4.8)
LYMPHOCYTES NFR BLD AUTO: 36.7 %
MCH RBC QN AUTO: 30.3 PG (ref 26–33)
MCHC RBC AUTO-ENTMCNC: 34.6 GM/DL (ref 32.2–35.5)
MCV RBC AUTO: 87.4 FL (ref 80–94)
MONOCYTES ABSOLUTE: 0.3 THOU/MM3 (ref 0.4–1.3)
MONOCYTES NFR BLD AUTO: 6.3 %
NEUTROPHILS ABSOLUTE: 2.7 THOU/MM3 (ref 1.8–7.7)
NEUTROPHILS NFR BLD AUTO: 50.8 %
NRBC BLD AUTO-RTO: 0 /100 WBC
PLATELET # BLD AUTO: 163 THOU/MM3 (ref 130–400)
PMV BLD AUTO: 10.2 FL (ref 9.4–12.4)
POTASSIUM SERPL-SCNC: 4.1 MEQ/L (ref 3.5–5.2)
RBC # BLD AUTO: 4.36 MILL/MM3 (ref 4.7–6.1)
SODIUM SERPL-SCNC: 142 MEQ/L (ref 135–145)
WBC # BLD AUTO: 5.4 THOU/MM3 (ref 4.8–10.8)

## 2025-04-21 PROCEDURE — 2500000003 HC RX 250 WO HCPCS

## 2025-04-21 PROCEDURE — 6370000000 HC RX 637 (ALT 250 FOR IP)

## 2025-04-21 PROCEDURE — 99232 SBSQ HOSP IP/OBS MODERATE 35: CPT | Performed by: NURSE PRACTITIONER

## 2025-04-21 PROCEDURE — 99232 SBSQ HOSP IP/OBS MODERATE 35: CPT | Performed by: INTERNAL MEDICINE

## 2025-04-21 PROCEDURE — 36415 COLL VENOUS BLD VENIPUNCTURE: CPT

## 2025-04-21 PROCEDURE — 82948 REAGENT STRIP/BLOOD GLUCOSE: CPT

## 2025-04-21 PROCEDURE — 85025 COMPLETE CBC W/AUTO DIFF WBC: CPT

## 2025-04-21 PROCEDURE — 6360000002 HC RX W HCPCS

## 2025-04-21 PROCEDURE — 6370000000 HC RX 637 (ALT 250 FOR IP): Performed by: INTERNAL MEDICINE

## 2025-04-21 PROCEDURE — 6370000000 HC RX 637 (ALT 250 FOR IP): Performed by: STUDENT IN AN ORGANIZED HEALTH CARE EDUCATION/TRAINING PROGRAM

## 2025-04-21 PROCEDURE — 2580000003 HC RX 258: Performed by: NURSE PRACTITIONER

## 2025-04-21 PROCEDURE — 6370000000 HC RX 637 (ALT 250 FOR IP): Performed by: NURSE PRACTITIONER

## 2025-04-21 PROCEDURE — 1200000000 HC SEMI PRIVATE

## 2025-04-21 PROCEDURE — 80048 BASIC METABOLIC PNL TOTAL CA: CPT

## 2025-04-21 RX ORDER — DOCUSATE SODIUM 100 MG/1
100 CAPSULE, LIQUID FILLED ORAL DAILY
Status: DISCONTINUED | OUTPATIENT
Start: 2025-04-21 | End: 2025-04-24 | Stop reason: HOSPADM

## 2025-04-21 RX ORDER — INSULIN LISPRO 100 [IU]/ML
0-15 INJECTION, SOLUTION INTRAVENOUS; SUBCUTANEOUS EVERY 24 HOURS
Status: DISCONTINUED | OUTPATIENT
Start: 2025-04-22 | End: 2025-04-24 | Stop reason: HOSPADM

## 2025-04-21 RX ORDER — INSULIN LISPRO 100 [IU]/ML
18 INJECTION, SOLUTION INTRAVENOUS; SUBCUTANEOUS
Status: DISCONTINUED | OUTPATIENT
Start: 2025-04-21 | End: 2025-04-21

## 2025-04-21 RX ORDER — METOCLOPRAMIDE HYDROCHLORIDE 5 MG/ML
10 INJECTION INTRAMUSCULAR; INTRAVENOUS EVERY 6 HOURS SCHEDULED
Status: DISCONTINUED | OUTPATIENT
Start: 2025-04-21 | End: 2025-04-24 | Stop reason: HOSPADM

## 2025-04-21 RX ORDER — INSULIN GLARGINE 100 [IU]/ML
45 INJECTION, SOLUTION SUBCUTANEOUS 2 TIMES DAILY
Status: DISCONTINUED | OUTPATIENT
Start: 2025-04-21 | End: 2025-04-22

## 2025-04-21 RX ORDER — INSULIN LISPRO 100 [IU]/ML
15 INJECTION, SOLUTION INTRAVENOUS; SUBCUTANEOUS
Status: DISCONTINUED | OUTPATIENT
Start: 2025-04-22 | End: 2025-04-22

## 2025-04-21 RX ADMIN — CHOLESTYRAMINE 4 G: 4 POWDER, FOR SUSPENSION ORAL at 08:15

## 2025-04-21 RX ADMIN — SODIUM CHLORIDE, PRESERVATIVE FREE 10 ML: 5 INJECTION INTRAVENOUS at 21:04

## 2025-04-21 RX ADMIN — METOCLOPRAMIDE 10 MG: 10 TABLET ORAL at 08:17

## 2025-04-21 RX ADMIN — SODIUM CHLORIDE: 0.9 INJECTION, SOLUTION INTRAVENOUS at 06:14

## 2025-04-21 RX ADMIN — ZOLPIDEM TARTRATE 10 MG: 5 TABLET, FILM COATED ORAL at 21:16

## 2025-04-21 RX ADMIN — INSULIN GLARGINE 45 UNITS: 100 INJECTION, SOLUTION SUBCUTANEOUS at 21:03

## 2025-04-21 RX ADMIN — INSULIN LISPRO 18 UNITS: 100 INJECTION, SOLUTION INTRAVENOUS; SUBCUTANEOUS at 13:24

## 2025-04-21 RX ADMIN — ROPINIROLE HYDROCHLORIDE 1 MG: 1 TABLET, FILM COATED ORAL at 21:03

## 2025-04-21 RX ADMIN — GABAPENTIN 600 MG: 600 TABLET, FILM COATED ORAL at 21:03

## 2025-04-21 RX ADMIN — METOCLOPRAMIDE 10 MG: 10 TABLET ORAL at 06:16

## 2025-04-21 RX ADMIN — DULOXETINE 90 MG: 60 CAPSULE, DELAYED RELEASE ORAL at 08:18

## 2025-04-21 RX ADMIN — DOCUSATE SODIUM 100 MG: 100 CAPSULE, LIQUID FILLED ORAL at 15:57

## 2025-04-21 RX ADMIN — OXYCODONE AND ACETAMINOPHEN 1 TABLET: 325; 5 TABLET ORAL at 08:13

## 2025-04-21 RX ADMIN — OXCARBAZEPINE 150 MG: 300 TABLET, FILM COATED ORAL at 08:16

## 2025-04-21 RX ADMIN — GABAPENTIN 600 MG: 600 TABLET, FILM COATED ORAL at 15:57

## 2025-04-21 RX ADMIN — ROPINIROLE HYDROCHLORIDE 1 MG: 1 TABLET, FILM COATED ORAL at 13:28

## 2025-04-21 RX ADMIN — INSULIN LISPRO 6 UNITS: 100 INJECTION, SOLUTION INTRAVENOUS; SUBCUTANEOUS at 13:25

## 2025-04-21 RX ADMIN — PANTOPRAZOLE SODIUM 40 MG: 40 TABLET, DELAYED RELEASE ORAL at 21:03

## 2025-04-21 RX ADMIN — ENOXAPARIN SODIUM 30 MG: 100 INJECTION SUBCUTANEOUS at 08:15

## 2025-04-21 RX ADMIN — LACOSAMIDE 200 MG: 50 TABLET, FILM COATED ORAL at 08:13

## 2025-04-21 RX ADMIN — PANTOPRAZOLE SODIUM 40 MG: 40 TABLET, DELAYED RELEASE ORAL at 08:13

## 2025-04-21 RX ADMIN — ROPINIROLE HYDROCHLORIDE 1 MG: 1 TABLET, FILM COATED ORAL at 08:16

## 2025-04-21 RX ADMIN — LACOSAMIDE 200 MG: 50 TABLET, FILM COATED ORAL at 21:03

## 2025-04-21 RX ADMIN — INSULIN GLARGINE 50 UNITS: 100 INJECTION, SOLUTION SUBCUTANEOUS at 08:14

## 2025-04-21 RX ADMIN — OXYCODONE AND ACETAMINOPHEN 2 TABLET: 5; 325 TABLET ORAL at 15:57

## 2025-04-21 RX ADMIN — GABAPENTIN 600 MG: 600 TABLET, FILM COATED ORAL at 08:18

## 2025-04-21 RX ADMIN — OXCARBAZEPINE 150 MG: 300 TABLET, FILM COATED ORAL at 21:03

## 2025-04-21 RX ADMIN — INSULIN LISPRO 3 UNITS: 100 INJECTION, SOLUTION INTRAVENOUS; SUBCUTANEOUS at 08:14

## 2025-04-21 RX ADMIN — METOCLOPRAMIDE 10 MG: 5 INJECTION, SOLUTION INTRAMUSCULAR; INTRAVENOUS at 17:58

## 2025-04-21 ASSESSMENT — ENCOUNTER SYMPTOMS
ALLERGIC/IMMUNOLOGIC NEGATIVE: 1
COUGH: 0
EYES NEGATIVE: 1
DIARRHEA: 0
VOMITING: 1
CONSTIPATION: 0
BLOOD IN STOOL: 0
SHORTNESS OF BREATH: 0
CHEST TIGHTNESS: 1
ABDOMINAL PAIN: 1
ANAL BLEEDING: 0
TROUBLE SWALLOWING: 0
ABDOMINAL DISTENTION: 0
RECTAL PAIN: 0
WHEEZING: 0
NAUSEA: 1

## 2025-04-21 ASSESSMENT — PAIN SCALES - GENERAL
PAINLEVEL_OUTOF10: 6
PAINLEVEL_OUTOF10: 7
PAINLEVEL_OUTOF10: 0
PAINLEVEL_OUTOF10: 0
PAINLEVEL_OUTOF10: 6

## 2025-04-21 ASSESSMENT — PAIN DESCRIPTION - LOCATION
LOCATION: ABDOMEN
LOCATION: ABDOMEN

## 2025-04-21 NOTE — CONSULTS
Consult History & Physical      Patient:  Nicolas Mackenzie  YOB: 1970  MRN: [id number]     Acct: [id number]  Code Status: Full Code  PCP: Kesha Hutson MD      Chief Complaint:    Chief Complaint   Patient presents with    Chest Pain       Date of Service: Pt seen/examined in consultation on 4/21/2025    History Of Present Illness:      Nicolas Mackenzie  is a 54 y.o. male who we are asked to see/evaluate by Dorothy Mancia APRN - CNP for medical management of chronic gastroparesis with decreased oral intake. Patient presented to the ED 4/17/25 due to chest pain, fluctuating heart rate and blood pressure that had been ongoing for 2 days.  Patient was diagnosed with gastroparesis in February 2025- oral medications did not work, he was placed on nasal spray and he reports this is not working either.  He was referred to  4/8/25 but has not been seen there yet.  His last EGD was inpatient at [facility] with  on 1/31/25 - he was diagnosed with esophagitis and gastritis.  Last Colonoscopy 2022.     Past Medical History:    Past Medical History:   Diagnosis Date    Abnormal thyroid biopsy     s/p left hemithyroidectomy 1/2021 -benign follicular adenoma    Acid reflux     Anemia     resolved - previously seen by Dr. Jordan (due to enlarged spleen)    Anesthesia     seizures with brain surgery due to blood sugar got really high    Bile reflux gastritis     per EGD 11/16/21 - Dr. Gregorio - to start Carafate.    Bipolar disorder     Cancer (HCC)     Charcot's joint of foot, left     Chest pain     previously seeing [facility] cardiologist, now seeing Dr. Valdez (LAD bridging on cath 4/2016), no CAD per [facility] 6/2022.    Chronic back pain     Chronic bronchitis (HCC)     Dr. Rocha    Colon polyp 08/30/2016    Depression     seeing Gulshan    Esophageal abnormality     nodule     Fatty liver disease, nonalcoholic     U/S 11/2013 [facility]    Follicular adenoma of thyroid gland 01/15/2021    s/p lobectomy    Furuncle  
    [facility]'s  Consult    Patient's Name/Date of Birth: Nicolas Mackenzie / 1970 (54 y.o.)    Date: April 18, 2025     Referring Provider: Dorothy Mancia APRN - CNP    CHIEF COMPLAINT: chest pain and diaphoresis     CONSULT FOR: chest pain     HPI: This is a pleasant 54 y.o. male with a PMHX of IDDM2, hyperlipidemia, RLS, GERD, seizures, insomnia, vitamin D deficiency, gastroparesis presents with with chest pain that he has had for the last 2 days that he has a couple times daily that is in the middle of his chest and just feels like a sharp pain patient does have Gregory's esophagus and was vomiting when I was evaluating him.  He reports a 50 pound weight loss over the past 3 months because he has not been able to eat due to his gastroparesis.  Patient's last cath was in June 2022 and it showed possible microvascular angina with no ischemia. Pt last echo was on 9/24 and it showed EF 55% with normal left ventricular function. EKG does not show any ischemia or changes from previous EKG. Troponin 44 pending trend.          Echo: 9/24 EF 55% normal left ventricular function       All labs, EKG's, diagnostic testing and images as well as cardiac cath, stress testing were reviewed during this encounter    Past Medical History:   Diagnosis Date    Abnormal thyroid biopsy     s/p left hemithyroidectomy 1/2021 -benign follicular adenoma    Acid reflux     Anemia     resolved - previously seen by Dr. Jordan (due to enlarged spleen)    Anesthesia     seizures with brain surgery due to blood sugar got really high    Bile reflux gastritis     per EGD 11/16/21 - Dr. Gregorio - to start Carafate.    Bipolar disorder     Cancer (HCC)     Charcot's joint of foot, left     Chest pain     previously seeing [facility] cardiologist, now seeing Dr. Valdez (LAD bridging on cath 4/2016), no CAD per [facility] 6/2022.    Chronic back pain     Chronic bronchitis (HCC)     Dr. Rocha    Colon polyp 08/30/2016    Depression     
Consult order placed 4/18. Initial consult seen and completed 4/18 by Dr. Huntley.  
   acl and debrided twice    OTHER SURGICAL HISTORY  05/31/2011    Tilt table was associated w/ nonspecific symptoms or nausea, otherwise no significant dizziness or syncope. No significant hemodynamic changes. Otherwise, unremarkable tilt table test after 30 minutes of tilting.     OTHER SURGICAL HISTORY  01/20/2017    RIGHT SHOULDER ARTHROSCOPY, OPEN STAN, OPEN ACROMIOPLASTY, BICEP TENDESIS, RIGHT CARPAL TUNNEL RELEASE    SHOULDER SURGERY Right 01/2007    THYROID LOBECTOMY N/A 01/15/2021    THYROID LOBECTOMY, UVULOPALATOPHARYNGOPLAST LAOP performed by Tyrone Mon MD at [facility] OR    TONSILLECTOMY      TRANSTHORACIC ECHOCARDIOGRAM  03/05/2011    LV size and systolic function normal. EF 55-65%.     UPPER GASTROINTESTINAL ENDOSCOPY  2012    UPPER GASTROINTESTINAL ENDOSCOPY  2016    Dr. Gregorio    UPPER GASTROINTESTINAL ENDOSCOPY Left 10/22/2019    EGD DILATION SAVORY performed by Tk Gregorio MD at [facility] Endoscopy    UPPER GASTROINTESTINAL ENDOSCOPY Left 10/22/2019    EGD BIOPSY performed by Tk Gregorio MD at [facility] Endoscopy    UPPER GASTROINTESTINAL ENDOSCOPY N/A 11/16/2021    EGD performed by Tk Gregorio MD at [facility] Endoscopy    UPPER GASTROINTESTINAL ENDOSCOPY Left 11/16/2021    EGD BIOPSY performed by Tk Gregorio MD at [facility] Endoscopy    UPPER GASTROINTESTINAL ENDOSCOPY N/A 4/2/2024    ESOPHAGOGASTRODUODENOSCOPY BIOPSY performed by Tk Gregorio MD at [facility] ENDOSCOPY    VENA CAVA FILTER PLACEMENT  2007       Social History     Tobacco Use    Smoking status: Never     Passive exposure: Never    Smokeless tobacco: Never   Substance Use Topics    Alcohol use: No        Family History   Problem Relation Age of Onset    Heart Disease Father 60        MI    Stroke Brother     Obesity Brother     Arthritis Mother     Seizures Mother     Obesity Sister     Other Brother         pulmonary embolism    Diabetes Daughter     Seizures Brother         Current Facility-Administered Medications   Medication

## 2025-04-22 LAB
ANION GAP SERPL CALC-SCNC: 6 MEQ/L (ref 8–16)
BASOPHILS ABSOLUTE: 0 THOU/MM3 (ref 0–0.1)
BASOPHILS NFR BLD AUTO: 0.4 %
BUN SERPL-MCNC: 9 MG/DL (ref 8–23)
CALCIUM SERPL-MCNC: 8.8 MG/DL (ref 8.6–10)
CHLORIDE SERPL-SCNC: 106 MEQ/L (ref 98–111)
CO2 SERPL-SCNC: 32 MEQ/L (ref 22–29)
CREAT SERPL-MCNC: 0.9 MG/DL (ref 0.7–1.2)
DEPRECATED RDW RBC AUTO: 41.8 FL (ref 35–45)
EOSINOPHIL NFR BLD AUTO: 5.6 %
EOSINOPHILS ABSOLUTE: 0.3 THOU/MM3 (ref 0–0.4)
ERYTHROCYTE [DISTWIDTH] IN BLOOD BY AUTOMATED COUNT: 13.2 % (ref 11.5–14.5)
GFR SERPL CREATININE-BSD FRML MDRD: > 90 ML/MIN/1.73M2
GLUCOSE BLD STRIP.AUTO-MCNC: 110 MG/DL (ref 70–108)
GLUCOSE BLD STRIP.AUTO-MCNC: 127 MG/DL (ref 70–108)
GLUCOSE BLD STRIP.AUTO-MCNC: 215 MG/DL (ref 70–108)
GLUCOSE BLD STRIP.AUTO-MCNC: 82 MG/DL (ref 70–108)
GLUCOSE SERPL-MCNC: 135 MG/DL (ref 74–109)
HCT VFR BLD AUTO: 39.3 % (ref 42–52)
HGB BLD-MCNC: 14 GM/DL (ref 14–18)
IMM GRANULOCYTES # BLD AUTO: 0.05 THOU/MM3 (ref 0–0.07)
IMM GRANULOCYTES NFR BLD AUTO: 0.9 %
LYMPHOCYTES ABSOLUTE: 2 THOU/MM3 (ref 1–4.8)
LYMPHOCYTES NFR BLD AUTO: 34.9 %
MCH RBC QN AUTO: 31.5 PG (ref 26–33)
MCHC RBC AUTO-ENTMCNC: 35.6 GM/DL (ref 32.2–35.5)
MCV RBC AUTO: 88.3 FL (ref 80–94)
MONOCYTES ABSOLUTE: 0.4 THOU/MM3 (ref 0.4–1.3)
MONOCYTES NFR BLD AUTO: 7.4 %
NEUTROPHILS ABSOLUTE: 2.8 THOU/MM3 (ref 1.8–7.7)
NEUTROPHILS NFR BLD AUTO: 50.8 %
NRBC BLD AUTO-RTO: 0 /100 WBC
PLATELET # BLD AUTO: 173 THOU/MM3 (ref 130–400)
PMV BLD AUTO: 10.1 FL (ref 9.4–12.4)
POTASSIUM SERPL-SCNC: 4.7 MEQ/L (ref 3.5–5.2)
RBC # BLD AUTO: 4.45 MILL/MM3 (ref 4.7–6.1)
SODIUM SERPL-SCNC: 144 MEQ/L (ref 135–145)
WBC # BLD AUTO: 5.6 THOU/MM3 (ref 4.8–10.8)

## 2025-04-22 PROCEDURE — 1200000000 HC SEMI PRIVATE

## 2025-04-22 PROCEDURE — 6370000000 HC RX 637 (ALT 250 FOR IP)

## 2025-04-22 PROCEDURE — 2500000003 HC RX 250 WO HCPCS

## 2025-04-22 PROCEDURE — 6360000002 HC RX W HCPCS

## 2025-04-22 PROCEDURE — 85025 COMPLETE CBC W/AUTO DIFF WBC: CPT

## 2025-04-22 PROCEDURE — 82948 REAGENT STRIP/BLOOD GLUCOSE: CPT

## 2025-04-22 PROCEDURE — 6370000000 HC RX 637 (ALT 250 FOR IP): Performed by: INTERNAL MEDICINE

## 2025-04-22 PROCEDURE — 80048 BASIC METABOLIC PNL TOTAL CA: CPT

## 2025-04-22 PROCEDURE — 36415 COLL VENOUS BLD VENIPUNCTURE: CPT

## 2025-04-22 PROCEDURE — 6370000000 HC RX 637 (ALT 250 FOR IP): Performed by: STUDENT IN AN ORGANIZED HEALTH CARE EDUCATION/TRAINING PROGRAM

## 2025-04-22 PROCEDURE — 99232 SBSQ HOSP IP/OBS MODERATE 35: CPT | Performed by: NURSE PRACTITIONER

## 2025-04-22 PROCEDURE — 6370000000 HC RX 637 (ALT 250 FOR IP): Performed by: NURSE PRACTITIONER

## 2025-04-22 RX ORDER — INSULIN GLARGINE 100 [IU]/ML
36 INJECTION, SOLUTION SUBCUTANEOUS 2 TIMES DAILY
Status: DISCONTINUED | OUTPATIENT
Start: 2025-04-22 | End: 2025-04-24 | Stop reason: HOSPADM

## 2025-04-22 RX ORDER — BISACODYL 10 MG
10 SUPPOSITORY, RECTAL RECTAL DAILY
Status: DISCONTINUED | OUTPATIENT
Start: 2025-04-22 | End: 2025-04-24 | Stop reason: HOSPADM

## 2025-04-22 RX ORDER — INSULIN LISPRO 100 [IU]/ML
12 INJECTION, SOLUTION INTRAVENOUS; SUBCUTANEOUS
Status: DISCONTINUED | OUTPATIENT
Start: 2025-04-23 | End: 2025-04-24 | Stop reason: HOSPADM

## 2025-04-22 RX ADMIN — Medication 6.25 MG: at 13:12

## 2025-04-22 RX ADMIN — SODIUM CHLORIDE, PRESERVATIVE FREE 10 ML: 5 INJECTION INTRAVENOUS at 21:24

## 2025-04-22 RX ADMIN — ENOXAPARIN SODIUM 30 MG: 100 INJECTION SUBCUTANEOUS at 21:01

## 2025-04-22 RX ADMIN — METOCLOPRAMIDE 10 MG: 5 INJECTION, SOLUTION INTRAMUSCULAR; INTRAVENOUS at 05:45

## 2025-04-22 RX ADMIN — GABAPENTIN 600 MG: 600 TABLET, FILM COATED ORAL at 15:12

## 2025-04-22 RX ADMIN — OXYCODONE AND ACETAMINOPHEN 2 TABLET: 5; 325 TABLET ORAL at 10:23

## 2025-04-22 RX ADMIN — INSULIN GLARGINE 36 UNITS: 100 INJECTION, SOLUTION SUBCUTANEOUS at 21:02

## 2025-04-22 RX ADMIN — METOCLOPRAMIDE 10 MG: 5 INJECTION, SOLUTION INTRAMUSCULAR; INTRAVENOUS at 13:13

## 2025-04-22 RX ADMIN — METOCLOPRAMIDE 10 MG: 5 INJECTION, SOLUTION INTRAMUSCULAR; INTRAVENOUS at 23:49

## 2025-04-22 RX ADMIN — GABAPENTIN 600 MG: 600 TABLET, FILM COATED ORAL at 21:01

## 2025-04-22 RX ADMIN — ROPINIROLE HYDROCHLORIDE 1 MG: 1 TABLET, FILM COATED ORAL at 15:11

## 2025-04-22 RX ADMIN — SODIUM CHLORIDE, PRESERVATIVE FREE 10 ML: 5 INJECTION INTRAVENOUS at 10:47

## 2025-04-22 RX ADMIN — INSULIN LISPRO 3 UNITS: 100 INJECTION, SOLUTION INTRAVENOUS; SUBCUTANEOUS at 21:01

## 2025-04-22 RX ADMIN — LACOSAMIDE 200 MG: 50 TABLET, FILM COATED ORAL at 10:30

## 2025-04-22 RX ADMIN — LACOSAMIDE 200 MG: 50 TABLET, FILM COATED ORAL at 21:01

## 2025-04-22 RX ADMIN — PANTOPRAZOLE SODIUM 40 MG: 40 TABLET, DELAYED RELEASE ORAL at 10:23

## 2025-04-22 RX ADMIN — OXCARBAZEPINE 150 MG: 300 TABLET, FILM COATED ORAL at 21:01

## 2025-04-22 RX ADMIN — PANTOPRAZOLE SODIUM 40 MG: 40 TABLET, DELAYED RELEASE ORAL at 21:01

## 2025-04-22 RX ADMIN — METOCLOPRAMIDE 10 MG: 5 INJECTION, SOLUTION INTRAMUSCULAR; INTRAVENOUS at 18:53

## 2025-04-22 RX ADMIN — DOCUSATE SODIUM 100 MG: 100 CAPSULE, LIQUID FILLED ORAL at 10:23

## 2025-04-22 RX ADMIN — OXYCODONE AND ACETAMINOPHEN 2 TABLET: 5; 325 TABLET ORAL at 21:01

## 2025-04-22 RX ADMIN — OXCARBAZEPINE 150 MG: 300 TABLET, FILM COATED ORAL at 10:24

## 2025-04-22 RX ADMIN — ZOLPIDEM TARTRATE 10 MG: 5 TABLET, FILM COATED ORAL at 21:01

## 2025-04-22 RX ADMIN — ROPINIROLE HYDROCHLORIDE 1 MG: 1 TABLET, FILM COATED ORAL at 10:26

## 2025-04-22 RX ADMIN — MAGNESIUM HYDROXIDE 30 ML: 1200 LIQUID ORAL at 10:44

## 2025-04-22 RX ADMIN — ENOXAPARIN SODIUM 30 MG: 100 INJECTION SUBCUTANEOUS at 10:24

## 2025-04-22 RX ADMIN — BISACODYL 10 MG: 10 SUPPOSITORY RECTAL at 10:44

## 2025-04-22 RX ADMIN — GABAPENTIN 600 MG: 600 TABLET, FILM COATED ORAL at 10:22

## 2025-04-22 RX ADMIN — CHOLESTYRAMINE 4 G: 4 POWDER, FOR SUSPENSION ORAL at 10:24

## 2025-04-22 RX ADMIN — ROPINIROLE HYDROCHLORIDE 1 MG: 1 TABLET, FILM COATED ORAL at 21:01

## 2025-04-22 RX ADMIN — DULOXETINE 90 MG: 60 CAPSULE, DELAYED RELEASE ORAL at 10:22

## 2025-04-22 RX ADMIN — INSULIN GLARGINE 45 UNITS: 100 INJECTION, SOLUTION SUBCUTANEOUS at 10:24

## 2025-04-22 RX ADMIN — ONDANSETRON 4 MG: 2 INJECTION, SOLUTION INTRAMUSCULAR; INTRAVENOUS at 12:01

## 2025-04-22 ASSESSMENT — PAIN DESCRIPTION - DESCRIPTORS
DESCRIPTORS: ACHING
DESCRIPTORS: ACHING

## 2025-04-22 ASSESSMENT — PAIN DESCRIPTION - ORIENTATION
ORIENTATION: MID
ORIENTATION: MID

## 2025-04-22 ASSESSMENT — PAIN SCALES - GENERAL
PAINLEVEL_OUTOF10: 7
PAINLEVEL_OUTOF10: 6
PAINLEVEL_OUTOF10: 9

## 2025-04-22 ASSESSMENT — PAIN DESCRIPTION - LOCATION
LOCATION: RECTUM
LOCATION: ABDOMEN

## 2025-04-22 NOTE — CARE COORDINATION
4/22/25, 7:46 AM EDT    DISCHARGE ON GOING EVALUATION    Nicolas SALMERON [facility] day: 3  Location: 8A-20/020-A Reason for admit: Orthostatic hypotension [I95.1]  Dehydration [E86.0]  Weight loss [R63.4]  Hyperglycemia [R73.9]  At high risk for falls [Z91.81]  Chest pain, unspecified type [R07.9]     Procedures: n/a    Imaging since last note: no acute    Barriers to Discharge: Hospitalist, cardiology, GI and endocrinology. GI was newly consulted yesterday for worsening gastroparesis with weight loss and decreased appetite (one meal or less per day). Reglan Q6H per GI. Carafate changed to slurry as pt stated pill was too big to take. GI recommends transfer to tertiary center if symptoms do not improve, along with possible tube insertion for tube feed. Endocrinology managing diabetes meds as blood glucose continues to fluctuate.     PCP: Kesha Hutson MD  Readmission Risk Score: 13.2    Patient Goals/Plan/Treatment Preferences: From home with wife, daughter and grandkids. Has DME. Current w  diabetes clinic. Denied needs on admission; following clinical course.

## 2025-04-23 LAB
ANION GAP SERPL CALC-SCNC: 9 MEQ/L (ref 8–16)
BASOPHILS ABSOLUTE: 0 THOU/MM3 (ref 0–0.1)
BASOPHILS NFR BLD AUTO: 0.4 %
BUN SERPL-MCNC: 10 MG/DL (ref 8–23)
CALCIUM SERPL-MCNC: 8.7 MG/DL (ref 8.6–10)
CHLORIDE SERPL-SCNC: 104 MEQ/L (ref 98–111)
CO2 SERPL-SCNC: 28 MEQ/L (ref 22–29)
CREAT SERPL-MCNC: 0.9 MG/DL (ref 0.7–1.2)
DEPRECATED RDW RBC AUTO: 41.7 FL (ref 35–45)
EOSINOPHIL NFR BLD AUTO: 5.9 %
EOSINOPHILS ABSOLUTE: 0.3 THOU/MM3 (ref 0–0.4)
ERYTHROCYTE [DISTWIDTH] IN BLOOD BY AUTOMATED COUNT: 13.1 % (ref 11.5–14.5)
GFR SERPL CREATININE-BSD FRML MDRD: > 90 ML/MIN/1.73M2
GLUCOSE BLD STRIP.AUTO-MCNC: 133 MG/DL (ref 70–108)
GLUCOSE BLD STRIP.AUTO-MCNC: 160 MG/DL (ref 70–108)
GLUCOSE BLD STRIP.AUTO-MCNC: 212 MG/DL (ref 70–108)
GLUCOSE BLD STRIP.AUTO-MCNC: 240 MG/DL (ref 70–108)
GLUCOSE BLD STRIP.AUTO-MCNC: 275 MG/DL (ref 70–108)
GLUCOSE BLD STRIP.AUTO-MCNC: 92 MG/DL (ref 70–108)
GLUCOSE SERPL-MCNC: 186 MG/DL (ref 74–109)
HCT VFR BLD AUTO: 39.5 % (ref 42–52)
HGB BLD-MCNC: 13.6 GM/DL (ref 14–18)
IMM GRANULOCYTES # BLD AUTO: 0.02 THOU/MM3 (ref 0–0.07)
IMM GRANULOCYTES NFR BLD AUTO: 0.4 %
LYMPHOCYTES ABSOLUTE: 1.9 THOU/MM3 (ref 1–4.8)
LYMPHOCYTES NFR BLD AUTO: 36.1 %
MCH RBC QN AUTO: 30.4 PG (ref 26–33)
MCHC RBC AUTO-ENTMCNC: 34.4 GM/DL (ref 32.2–35.5)
MCV RBC AUTO: 88.2 FL (ref 80–94)
MONOCYTES ABSOLUTE: 0.4 THOU/MM3 (ref 0.4–1.3)
MONOCYTES NFR BLD AUTO: 7.6 %
NEUTROPHILS ABSOLUTE: 2.6 THOU/MM3 (ref 1.8–7.7)
NEUTROPHILS NFR BLD AUTO: 49.6 %
NRBC BLD AUTO-RTO: 0 /100 WBC
PLATELET # BLD AUTO: 176 THOU/MM3 (ref 130–400)
PMV BLD AUTO: 9.8 FL (ref 9.4–12.4)
POTASSIUM SERPL-SCNC: 4.2 MEQ/L (ref 3.5–5.2)
RBC # BLD AUTO: 4.48 MILL/MM3 (ref 4.7–6.1)
SODIUM SERPL-SCNC: 141 MEQ/L (ref 135–145)
WBC # BLD AUTO: 5.2 THOU/MM3 (ref 4.8–10.8)

## 2025-04-23 PROCEDURE — 6360000002 HC RX W HCPCS

## 2025-04-23 PROCEDURE — 6370000000 HC RX 637 (ALT 250 FOR IP)

## 2025-04-23 PROCEDURE — 6370000000 HC RX 637 (ALT 250 FOR IP): Performed by: INTERNAL MEDICINE

## 2025-04-23 PROCEDURE — 85025 COMPLETE CBC W/AUTO DIFF WBC: CPT

## 2025-04-23 PROCEDURE — 6370000000 HC RX 637 (ALT 250 FOR IP): Performed by: NURSE PRACTITIONER

## 2025-04-23 PROCEDURE — 36415 COLL VENOUS BLD VENIPUNCTURE: CPT

## 2025-04-23 PROCEDURE — 99232 SBSQ HOSP IP/OBS MODERATE 35: CPT | Performed by: INTERNAL MEDICINE

## 2025-04-23 PROCEDURE — 2580000003 HC RX 258: Performed by: NURSE PRACTITIONER

## 2025-04-23 PROCEDURE — 2500000003 HC RX 250 WO HCPCS

## 2025-04-23 PROCEDURE — 82948 REAGENT STRIP/BLOOD GLUCOSE: CPT

## 2025-04-23 PROCEDURE — 6370000000 HC RX 637 (ALT 250 FOR IP): Performed by: STUDENT IN AN ORGANIZED HEALTH CARE EDUCATION/TRAINING PROGRAM

## 2025-04-23 PROCEDURE — 1200000000 HC SEMI PRIVATE

## 2025-04-23 PROCEDURE — 99232 SBSQ HOSP IP/OBS MODERATE 35: CPT | Performed by: NURSE PRACTITIONER

## 2025-04-23 PROCEDURE — 80048 BASIC METABOLIC PNL TOTAL CA: CPT

## 2025-04-23 RX ADMIN — MAJOR MAGNESIUM CITRATE ORAL SOLUTION - LEMON 296 ML: 1.75 LIQUID ORAL at 14:29

## 2025-04-23 RX ADMIN — BISACODYL 10 MG: 10 SUPPOSITORY RECTAL at 09:24

## 2025-04-23 RX ADMIN — METOCLOPRAMIDE 10 MG: 5 INJECTION, SOLUTION INTRAMUSCULAR; INTRAVENOUS at 13:00

## 2025-04-23 RX ADMIN — PANTOPRAZOLE SODIUM 40 MG: 40 TABLET, DELAYED RELEASE ORAL at 09:23

## 2025-04-23 RX ADMIN — METOCLOPRAMIDE 10 MG: 5 INJECTION, SOLUTION INTRAMUSCULAR; INTRAVENOUS at 09:23

## 2025-04-23 RX ADMIN — CHOLESTYRAMINE 4 G: 4 POWDER, FOR SUSPENSION ORAL at 09:24

## 2025-04-23 RX ADMIN — Medication 6.25 MG: at 09:25

## 2025-04-23 RX ADMIN — GABAPENTIN 600 MG: 600 TABLET, FILM COATED ORAL at 13:00

## 2025-04-23 RX ADMIN — GABAPENTIN 600 MG: 600 TABLET, FILM COATED ORAL at 09:23

## 2025-04-23 RX ADMIN — ENOXAPARIN SODIUM 30 MG: 100 INJECTION SUBCUTANEOUS at 09:24

## 2025-04-23 RX ADMIN — OXYCODONE AND ACETAMINOPHEN 2 TABLET: 5; 325 TABLET ORAL at 17:21

## 2025-04-23 RX ADMIN — METOCLOPRAMIDE 10 MG: 5 INJECTION, SOLUTION INTRAMUSCULAR; INTRAVENOUS at 18:37

## 2025-04-23 RX ADMIN — INSULIN LISPRO 12 UNITS: 100 INJECTION, SOLUTION INTRAVENOUS; SUBCUTANEOUS at 13:01

## 2025-04-23 RX ADMIN — INSULIN LISPRO 3 UNITS: 100 INJECTION, SOLUTION INTRAVENOUS; SUBCUTANEOUS at 13:02

## 2025-04-23 RX ADMIN — PANTOPRAZOLE SODIUM 40 MG: 40 TABLET, DELAYED RELEASE ORAL at 21:53

## 2025-04-23 RX ADMIN — ENOXAPARIN SODIUM 30 MG: 100 INJECTION SUBCUTANEOUS at 21:54

## 2025-04-23 RX ADMIN — INSULIN LISPRO 12 UNITS: 100 INJECTION, SOLUTION INTRAVENOUS; SUBCUTANEOUS at 18:37

## 2025-04-23 RX ADMIN — INSULIN LISPRO 3 UNITS: 100 INJECTION, SOLUTION INTRAVENOUS; SUBCUTANEOUS at 03:59

## 2025-04-23 RX ADMIN — ROPINIROLE HYDROCHLORIDE 1 MG: 1 TABLET, FILM COATED ORAL at 21:52

## 2025-04-23 RX ADMIN — LIDOCAINE HYDROCHLORIDE: 20 SOLUTION ORAL at 22:09

## 2025-04-23 RX ADMIN — ZOLPIDEM TARTRATE 10 MG: 5 TABLET, FILM COATED ORAL at 22:08

## 2025-04-23 RX ADMIN — ROPINIROLE HYDROCHLORIDE 1 MG: 1 TABLET, FILM COATED ORAL at 13:00

## 2025-04-23 RX ADMIN — MAGNESIUM HYDROXIDE 30 ML: 1200 LIQUID ORAL at 09:24

## 2025-04-23 RX ADMIN — SUCRALFATE 1 G: 1 TABLET ORAL at 21:52

## 2025-04-23 RX ADMIN — SODIUM CHLORIDE, PRESERVATIVE FREE 10 ML: 5 INJECTION INTRAVENOUS at 21:54

## 2025-04-23 RX ADMIN — ROPINIROLE HYDROCHLORIDE 1 MG: 1 TABLET, FILM COATED ORAL at 09:23

## 2025-04-23 RX ADMIN — LACOSAMIDE 200 MG: 50 TABLET, FILM COATED ORAL at 21:52

## 2025-04-23 RX ADMIN — SODIUM CHLORIDE, PRESERVATIVE FREE 10 ML: 5 INJECTION INTRAVENOUS at 09:49

## 2025-04-23 RX ADMIN — DULOXETINE 90 MG: 60 CAPSULE, DELAYED RELEASE ORAL at 09:23

## 2025-04-23 RX ADMIN — DOCUSATE SODIUM 100 MG: 100 CAPSULE, LIQUID FILLED ORAL at 09:24

## 2025-04-23 RX ADMIN — INSULIN GLARGINE 36 UNITS: 100 INJECTION, SOLUTION SUBCUTANEOUS at 09:24

## 2025-04-23 RX ADMIN — OXCARBAZEPINE 150 MG: 300 TABLET, FILM COATED ORAL at 21:51

## 2025-04-23 RX ADMIN — Medication 6.25 MG: at 18:32

## 2025-04-23 RX ADMIN — SODIUM CHLORIDE: 0.9 INJECTION, SOLUTION INTRAVENOUS at 22:19

## 2025-04-23 RX ADMIN — OXCARBAZEPINE 150 MG: 300 TABLET, FILM COATED ORAL at 09:25

## 2025-04-23 RX ADMIN — GABAPENTIN 600 MG: 600 TABLET, FILM COATED ORAL at 21:52

## 2025-04-23 RX ADMIN — LACOSAMIDE 200 MG: 50 TABLET, FILM COATED ORAL at 09:23

## 2025-04-23 ASSESSMENT — PAIN DESCRIPTION - LOCATION
LOCATION: ABDOMEN
LOCATION: ABDOMEN

## 2025-04-23 ASSESSMENT — PAIN SCALES - GENERAL
PAINLEVEL_OUTOF10: 9
PAINLEVEL_OUTOF10: 8
PAINLEVEL_OUTOF10: 5

## 2025-04-23 ASSESSMENT — PAIN DESCRIPTION - ORIENTATION
ORIENTATION: RIGHT;MID;LEFT
ORIENTATION: RIGHT;LEFT;MID

## 2025-04-23 ASSESSMENT — PAIN DESCRIPTION - DESCRIPTORS
DESCRIPTORS: ACHING
DESCRIPTORS: ACHING

## 2025-04-24 ENCOUNTER — APPOINTMENT (OUTPATIENT)
Dept: GENERAL RADIOLOGY | Age: 55
DRG: 073 | End: 2025-04-24
Payer: MEDICARE

## 2025-04-24 VITALS
TEMPERATURE: 97.8 F | RESPIRATION RATE: 18 BRPM | WEIGHT: 254 LBS | DIASTOLIC BLOOD PRESSURE: 74 MMHG | OXYGEN SATURATION: 95 % | SYSTOLIC BLOOD PRESSURE: 128 MMHG | HEART RATE: 82 BPM | HEIGHT: 75 IN | BODY MASS INDEX: 31.58 KG/M2

## 2025-04-24 LAB
GLUCOSE BLD STRIP.AUTO-MCNC: 125 MG/DL (ref 70–108)
GLUCOSE BLD STRIP.AUTO-MCNC: 143 MG/DL (ref 70–108)
GLUCOSE BLD STRIP.AUTO-MCNC: 179 MG/DL (ref 70–108)

## 2025-04-24 PROCEDURE — 6360000002 HC RX W HCPCS

## 2025-04-24 PROCEDURE — 99239 HOSP IP/OBS DSCHRG MGMT >30: CPT | Performed by: NURSE PRACTITIONER

## 2025-04-24 PROCEDURE — 6370000000 HC RX 637 (ALT 250 FOR IP)

## 2025-04-24 PROCEDURE — 2500000003 HC RX 250 WO HCPCS

## 2025-04-24 PROCEDURE — 6370000000 HC RX 637 (ALT 250 FOR IP): Performed by: NURSE PRACTITIONER

## 2025-04-24 PROCEDURE — 6370000000 HC RX 637 (ALT 250 FOR IP): Performed by: INTERNAL MEDICINE

## 2025-04-24 PROCEDURE — 6370000000 HC RX 637 (ALT 250 FOR IP): Performed by: STUDENT IN AN ORGANIZED HEALTH CARE EDUCATION/TRAINING PROGRAM

## 2025-04-24 PROCEDURE — 82948 REAGENT STRIP/BLOOD GLUCOSE: CPT

## 2025-04-24 PROCEDURE — 74018 RADEX ABDOMEN 1 VIEW: CPT

## 2025-04-24 RX ORDER — INSULIN GLARGINE 100 [IU]/ML
36 INJECTION, SOLUTION SUBCUTANEOUS 2 TIMES DAILY
Qty: 45 ML | Refills: 0
Start: 2025-04-24

## 2025-04-24 RX ORDER — INSULIN LISPRO 100 [IU]/ML
INJECTION, SOLUTION INTRAVENOUS; SUBCUTANEOUS
Qty: 60 ADJUSTABLE DOSE PRE-FILLED PEN SYRINGE | Refills: 2
Start: 2025-04-24

## 2025-04-24 RX ADMIN — SUCRALFATE 1 G: 1 TABLET ORAL at 10:26

## 2025-04-24 RX ADMIN — LACOSAMIDE 200 MG: 50 TABLET, FILM COATED ORAL at 10:25

## 2025-04-24 RX ADMIN — INSULIN LISPRO 12 UNITS: 100 INJECTION, SOLUTION INTRAVENOUS; SUBCUTANEOUS at 09:23

## 2025-04-24 RX ADMIN — ROPINIROLE HYDROCHLORIDE 1 MG: 1 TABLET, FILM COATED ORAL at 13:39

## 2025-04-24 RX ADMIN — METOCLOPRAMIDE 10 MG: 5 INJECTION, SOLUTION INTRAMUSCULAR; INTRAVENOUS at 12:03

## 2025-04-24 RX ADMIN — Medication 6.25 MG: at 12:03

## 2025-04-24 RX ADMIN — DULOXETINE 90 MG: 60 CAPSULE, DELAYED RELEASE ORAL at 10:34

## 2025-04-24 RX ADMIN — ROPINIROLE HYDROCHLORIDE 1 MG: 1 TABLET, FILM COATED ORAL at 10:26

## 2025-04-24 RX ADMIN — GABAPENTIN 600 MG: 600 TABLET, FILM COATED ORAL at 13:39

## 2025-04-24 RX ADMIN — CHOLESTYRAMINE 4 G: 4 POWDER, FOR SUSPENSION ORAL at 10:26

## 2025-04-24 RX ADMIN — OXCARBAZEPINE 150 MG: 300 TABLET, FILM COATED ORAL at 10:26

## 2025-04-24 RX ADMIN — BARIUM SULFATE 750 ML: 1.05 SUSPENSION ORAL; RECTAL at 11:36

## 2025-04-24 RX ADMIN — DOCUSATE SODIUM 100 MG: 100 CAPSULE, LIQUID FILLED ORAL at 10:27

## 2025-04-24 RX ADMIN — INSULIN LISPRO 12 UNITS: 100 INJECTION, SOLUTION INTRAVENOUS; SUBCUTANEOUS at 13:39

## 2025-04-24 RX ADMIN — METOCLOPRAMIDE 10 MG: 5 INJECTION, SOLUTION INTRAMUSCULAR; INTRAVENOUS at 06:25

## 2025-04-24 RX ADMIN — MAGNESIUM HYDROXIDE 30 ML: 1200 LIQUID ORAL at 10:26

## 2025-04-24 RX ADMIN — PANTOPRAZOLE SODIUM 40 MG: 40 TABLET, DELAYED RELEASE ORAL at 10:26

## 2025-04-24 RX ADMIN — METOCLOPRAMIDE 10 MG: 5 INJECTION, SOLUTION INTRAMUSCULAR; INTRAVENOUS at 01:37

## 2025-04-24 RX ADMIN — INSULIN GLARGINE 36 UNITS: 100 INJECTION, SOLUTION SUBCUTANEOUS at 09:23

## 2025-04-24 RX ADMIN — GABAPENTIN 600 MG: 600 TABLET, FILM COATED ORAL at 10:26

## 2025-04-24 RX ADMIN — SODIUM CHLORIDE, PRESERVATIVE FREE 10 ML: 5 INJECTION INTRAVENOUS at 10:25

## 2025-04-24 RX ADMIN — ENOXAPARIN SODIUM 30 MG: 100 INJECTION SUBCUTANEOUS at 10:24

## 2025-04-24 ASSESSMENT — PAIN SCALES - GENERAL: PAINLEVEL_OUTOF10: 6

## 2025-04-24 NOTE — PLAN OF CARE
Problem: ABCDS Injury Assessment  Goal: Absence of physical injury  Outcome: Progressing     Problem: Safety - Adult  Goal: Free from fall injury  Outcome: Progressing  Flowsheets (Taken 4/22/2025 0116)  Free From Fall Injury: Instruct family/caregiver on patient safety     Problem: Skin/Tissue Integrity  Goal: Skin integrity remains intact  Description: 1.  Monitor for areas of redness and/or skin breakdown2.  Assess vascular access sites hourly3.  Every 4-6 hours minimum:  Change oxygen saturation probe site4.  Every 4-6 hours:  If on nasal continuous positive airway pressure, respiratory therapy assess nares and determine need for appliance change or resting period  Outcome: Progressing  Flowsheets (Taken 4/21/2025 2050)  Skin Integrity Remains Intact: Monitor for areas of redness and/or skin breakdown     Problem: Pain  Goal: Verbalizes/displays adequate comfort level or baseline comfort level  Outcome: Progressing  Flowsheets (Taken 4/21/2025 2030)  Verbalizes/displays adequate comfort level or baseline comfort level: Encourage patient to monitor pain and request assistance     
  Problem: Chronic Conditions and Co-morbidities  Goal: Patient's chronic conditions and co-morbidity symptoms are monitored and maintained or improved  4/18/2025 1008 by Laura Castro RN  Outcome: Progressing  4/18/2025 0129 by Holly Ivy RN  Outcome: Progressing  Flowsheets (Taken 4/17/2025 2000)  Care Plan - Patient's Chronic Conditions and Co-Morbidity Symptoms are Monitored and Maintained or Improved: Monitor and assess patient's chronic conditions and comorbid symptoms for stability, deterioration, or improvement     Problem: Discharge Planning  Goal: Discharge to home or other facility with appropriate resources  4/18/2025 1008 by Laura Castro RN  Outcome: Progressing  4/18/2025 0129 by Holly Ivy RN  Outcome: Progressing  Flowsheets (Taken 4/17/2025 2000)  Discharge to home or other facility with appropriate resources: Identify barriers to discharge with patient and caregiver     Problem: Pain  Goal: Verbalizes/displays adequate comfort level or baseline comfort level  4/18/2025 1008 by Laura Castro RN  Outcome: Progressing  4/18/2025 0129 by Holly Ivy RN  Outcome: Progressing     Problem: Skin/Tissue Integrity  Goal: Skin integrity remains intact  Description: 1.  Monitor for areas of redness and/or skin breakdown2.  Assess vascular access sites hourly3.  Every 4-6 hours minimum:  Change oxygen saturation probe site4.  Every 4-6 hours:  If on nasal continuous positive airway pressure, respiratory therapy assess nares and determine need for appliance change or resting period  4/18/2025 1008 by Laura Castro RN  Outcome: Progressing  4/18/2025 0129 by Holly Ivy RN  Outcome: Progressing  Flowsheets (Taken 4/17/2025 2000)  Skin Integrity Remains Intact: Monitor for areas of redness and/or skin breakdown     Problem: Safety - Adult  Goal: Free from fall injury  4/18/2025 1008 by Laura Castro RN  Outcome: Progressing  4/18/2025 0129 by Holly Ivy RN  Outcome: 
  Problem: Chronic Conditions and Co-morbidities  Goal: Patient's chronic conditions and co-morbidity symptoms are monitored and maintained or improved  Outcome: Progressing     Problem: Discharge Planning  Goal: Discharge to home or other facility with appropriate resources  Outcome: Progressing     Problem: Pain  Goal: Verbalizes/displays adequate comfort level or baseline comfort level  Outcome: Progressing     Problem: Skin/Tissue Integrity  Goal: Skin integrity remains intact  Description: 1.  Monitor for areas of redness and/or skin breakdown2.  Assess vascular access sites hourly3.  Every 4-6 hours minimum:  Change oxygen saturation probe site4.  Every 4-6 hours:  If on nasal continuous positive airway pressure, respiratory therapy assess nares and determine need for appliance change or resting period  Outcome: Progressing     Problem: Safety - Adult  Goal: Free from fall injury  Outcome: Progressing     Problem: ABCDS Injury Assessment  Goal: Absence of physical injury  Outcome: Progressing     
  Problem: Chronic Conditions and Co-morbidities  Goal: Patient's chronic conditions and co-morbidity symptoms are monitored and maintained or improved  Outcome: Progressing     Problem: Discharge Planning  Goal: Discharge to home or other facility with appropriate resources  Outcome: Progressing     Problem: Pain  Goal: Verbalizes/displays adequate comfort level or baseline comfort level  Outcome: Progressing     Problem: Skin/Tissue Integrity  Goal: Skin integrity remains intact  Description: 1.  Monitor for areas of redness and/or skin breakdown2.  Assess vascular access sites hourly3.  Every 4-6 hours minimum:  Change oxygen saturation probe site4.  Every 4-6 hours:  If on nasal continuous positive airway pressure, respiratory therapy assess nares and determine need for appliance change or resting period  Outcome: Progressing     Problem: Safety - Adult  Goal: Free from fall injury  Outcome: Progressing     Problem: ABCDS Injury Assessment  Goal: Absence of physical injury  Outcome: Progressing     Problem: Nutrition Deficit:  Goal: Optimize nutritional status  Outcome: Progressing     
  Problem: Chronic Conditions and Co-morbidities  Goal: Patient's chronic conditions and co-morbidity symptoms are monitored and maintained or improved  Outcome: Progressing  Flowsheets (Taken 4/17/2025 2000)  Care Plan - Patient's Chronic Conditions and Co-Morbidity Symptoms are Monitored and Maintained or Improved: Monitor and assess patient's chronic conditions and comorbid symptoms for stability, deterioration, or improvement     Problem: Discharge Planning  Goal: Discharge to home or other facility with appropriate resources  Outcome: Progressing  Flowsheets (Taken 4/17/2025 2000)  Discharge to home or other facility with appropriate resources: Identify barriers to discharge with patient and caregiver     Problem: Pain  Goal: Verbalizes/displays adequate comfort level or baseline comfort level  Outcome: Progressing     Problem: Skin/Tissue Integrity  Goal: Skin integrity remains intact  Description: 1.  Monitor for areas of redness and/or skin breakdown2.  Assess vascular access sites hourly3.  Every 4-6 hours minimum:  Change oxygen saturation probe site4.  Every 4-6 hours:  If on nasal continuous positive airway pressure, respiratory therapy assess nares and determine need for appliance change or resting period  Outcome: Progressing  Flowsheets (Taken 4/17/2025 2000)  Skin Integrity Remains Intact: Monitor for areas of redness and/or skin breakdown     Problem: Safety - Adult  Goal: Free from fall injury  Outcome: Progressing     Problem: ABCDS Injury Assessment  Goal: Absence of physical injury  Outcome: Progressing     
Was notified by RN that patient was going to the bathroom stated he became dizzy and fell on the ground and hit his head on the bed.  Patient examined was on room air, denies thickness of breath, chest pain or palpitations.   Head atraumatic, no tenderness on palpation or no swelling noted  No neurological deficit noted   Lungs clear to auscultation, heart rate regular and regular rhythm.  Musculoskeletal active full range of motion in passive  Skin no bruising or lesions noted  Ordered CT head without contrast showed:No acute intracranial hemorrhage   Patient does have positive orthostatics   IV fluids normal saline to run at 75 x 1000 ml bag  CHERY hose  Patient instructed not to move out of bed by himself to ask for help  We will continue to monitor  
Progressing     Problem: ABCDS Injury Assessment  Goal: Absence of physical injury  4/18/2025 2238 by Holly Ivy, RN  Outcome: Progressing  4/18/2025 1008 by Laura Castro, RN  Outcome: Progressing     Problem: Nutrition Deficit:  Goal: Optimize nutritional status  Outcome: Progressing

## 2025-04-24 NOTE — DISCHARGE INSTRUCTIONS
Keep follow up with [facility] on May 8th.  Take it easy with your diet.   Soft bland foods.  Use Miralax, colace and senna over the counter for constipation.

## 2025-04-24 NOTE — PROGRESS NOTES
Hospitalist Progress Note    Patient:  Nicolas Mackenzie      Unit/Bed:8A-20/020-A    YOB: 1970    MRN: [id number]       Acct: [id number]     PCP: Kesha Hutson MD    Date of Admission: 4/17/2025    Assessment/Plan:    1 chest pain: Patient with reported substernal nonradiating chest pain that was associated with diaphoresis prior to admission.  Patient still reports some chest pain during exam.  EKG normal sinus rhythm no ST/T wave changes  Troponin level 44 we will trend  Last LHC at [facility] 6/2022 with no significant CAD.   Cardiology consulted to evaluate patient    4/19/25  Patient was evaluated by cardiology indicated they suspect the chest pain is due to gastroparesis versus esophagitis see notes appreciate input.  Elevated to observation  Number    2.  Orthostatic hypotension: Most likely due to dehydration in patient with reported decreased p.o. intake reported not having eaten for the last 2 days, in the setting of chronic gastroparesis reported has not had improved oral intake for the last 3 months.  With 50 pound weight loss  Monitor orthostatic Bps    4/20/25  Had  IV fluids gentle hydration last evening  Continue CHERY wolfe  Discussed with the patient about the need to ask for assistant when going to the bathroom or ambulating.  Discussed about standing gently prior to ambulation noted to have some balance and to prevent falls patient verbalized understanding    3.  Gastroparesis: Patient follows at [facility]. Gastric emptying study 2/25/25 at [facility] confirmed diagnosis.  Patient is on on nasal spray Gimoti that he receives from his GI provider.  Per record patient did not tolerate oral medications very well and didn't seem to work. States nasal spray isnt working either, was referred to a clinic in [place] by GI doctor.    CT abdomen and pelvis done on/17/25 showed :no acute findings. Appendix is normal. Colonic and duodenal diverticula.. Nonobstructing right renal stone. 
    Hospitalist Progress Note    Patient:  Nicolas Mackenzie      Unit/Bed:8A-20/020-A    YOB: 1970    MRN: [id number]       Acct: [id number]     PCP: Kesha Hutson MD    Date of Admission: 4/17/2025    Assessment/Plan:    1 chest pain: Patient with reported substernal nonradiating chest pain that was associated with diaphoresis prior to admission.  Patient still reports some chest pain during exam.  EKG normal sinus rhythm no ST/T wave changes  Troponin level 44 we will trend  Last C at [facility] 6/2022 with no significant CAD.   Cardiology consulted to evaluate patient    4/19/25  Patient was evaluated by cardiology indicated they suspect the chest pain is due to gastroparesis versus esophagitis see notes appreciate input.  Elevated to observation  Number    2.  Orthostatic hypotension: Most likely due to dehydration in patient with reported decreased p.o. intake reported not having eaten for the last 2 days, in the setting of chronic gastroparesis reported has not had improved oral intake for the last 3 months.  With 50 pound weight loss  Monitor orthostatic Bps    3.  Gastroparesis: Patient follows at [facility]. Gastric emptying study 2/25/25 at [facility] confirmed diagnosis.  Patient is on on nasal spray Gimoti that he receives from his GI provider.  Per record patient did not tolerate oral medications very well and didn't seem to work. States nasal spray isnt working either, was referred to a clinic in [place] by GI doctor.    CT abdomen and pelvis done on/17/25 showed :no acute findings. Appendix is normal. Colonic and duodenal diverticula.. Nonobstructing right renal stone. Nonspecific mild splenomegaly.  Patient still experiencing nausea and vomiting following food intake  We will add IV azithromycin 500 mg every 12 hours x 3 doses  Continue with home medications    4.  Insulin-dependent diabetes mellitus type 2 poorly controlled  A1c on 1/7/2025 8.3, 4/18/25current A1c 10.7   Patient at 
    Hospitalist Progress Note    Patient:  Nicolas Mackenzie      Unit/Bed:8A-20/020-A    YOB: 1970    MRN: [id number]       Acct: [id number]     PCP: Kesha Hutson MD    Date of Admission: 4/17/2025    Assessment/Plan:    1 chest pain: Patient with reported substernal nonradiating chest pain that was associated with diaphoresis prior to admission.  Patient still reports some chest pain during exam.  EKG normal sinus rhythm no ST/T wave changes  Troponin level 44 we will trend  Last C at [facility] 6/2022 with no significant CAD.   Cardiology consulted to evaluate patient    2.  Orthostatic hypotension: Most likely due to dehydration in patient with reported decreased p.o. intake reported not having eaten for the last 2 days, in the setting of chronic gastroparesis reported has not had improved oral intake for the last 3 months.  With 50 pound weight loss  Monitor orthostatic Bps    3.  Gastroparesis: Patient follows at [facility]. Gastric emptying study 2/25/25 at [facility] confirmed diagnosis.  Patient is on on nasal spray Gimoti that he receives from his GI provider.  Per record patient did not tolerate oral medications very well and didn't seem to work. States nasal spray isnt working either, was referred to a clinic in [place] by GI doctor.    CT abdomen and pelvis done on/17/25 showed :no acute findings. Appendix is normal. Colonic and duodenal diverticula.. Nonobstructing right renal stone. Nonspecific mild splenomegaly.  Patient still experiencing nausea and vomiting following food intake  We will add IV azithromycin 500 mg every 12 hours x 3 doses  Continue with home medications    4.  Insulin-dependent diabetes mellitus type 2 poorly controlled  A1c on 1/7/2025 8.3, 4/18/25current A1c 10.7   Patient at home on Lantus 18 units twice daily, lispro 50 to 60 units with meals plus SSI   Patient was started on insulin dosing at 40 units Lantus twice daily +20 3 times daily lispro with meals and 
    Hospitalist Progress Note    Patient:  Nicolas Mackenzie      Unit/Bed:8A-20/020-A    YOB: 1970    MRN: [id number]       Acct: [id number]     PCP: Kesha Hutson MD    Date of Admission: 4/17/2025    Assessment/Plan:      Gastroparesis due to uncontrolled DM. GI and endocrine following. Has upcoming appointment 5/8 at . S/p Azithromycin. Continue Reglan.  Small frequent meals. Dietician following.  Type 2 diabetes. Held Humalog with breakfast due to refusing to eat. Endocrine following. Adjusting insulin accordingly.   Constipation. MOM. Dulcolax suppository, Tap water enema x 4 BID.   Atypical chest pain due GI issues. ACS ruled out. Cardiology seen.   GERD with esophagitis/gastritis, noted on last EGD 1/2025 at [facility]. On PPI and carafate which he has been noncompliant with.  Primary HTN.  Seizure hx.  Obesity. BMI 31.75.  Vit D def.     Dispo: stop trending daily labs.    Chief Complaint: Chest pain + diaphoresis     Hospital Course:     History of Present Illness:  Nicolas Mackenzie is a 54 y.o. male with a PMH of IDDM 2, hypertension, hyperlipidemia, MDD/AVERY, RLS, GERD, seizures, insomnia, vitamin D deficiency, gastroparesis who presented with chest pain.  Has been going on for approximately 2 days, intermittent, nonradiating, no aggravating or alleviating factors.  Last [facility] 2022 with no significant coronary artery disease.  Last echo 9/2024 was negative for any systolic or diastolic dysfunction. Also notable for decreased p.o. intake.  Has not eaten anything for 2 to 3 days now.  Diagnosed with gastroparesis in February 2025.  When he does eat he is only eating 1 meal a day.  Has been following with GI outpatient, oral meds have not been working.  Tried a metoclopramide nasal spray that has not been working either.  Patient reports a 50 pound weight loss over the past 3 months which she attributes to not eating.  Last colonoscopy was in 2022 which showed multiple colon polyps with snare 
Comprehensive Nutrition Assessment    Type and Reason for Visit:  Initial, Positive nutrition screen (Weight Loss and Decreased Appetite)    Nutrition Recommendations/Plan:   No PO intake. If continued, recommend consideration for dobhoff placement in small bowel for feeding.   Tube feeding recs if needed and not resumed on carafate: Glucerna 1.5 at 15 mL/hr, advancing 10 mL/hr every 12 hours as tolerated to reach goal rate of 55 mL/hr x24 hours. Slow initiation d/t risk of refeeding syndrome.  NOTE: IF RESTARTED ON CARAFATE, will need to adjust regimen recommendations: Glucerna 1.5 at  10 mL/hr, advancing 10 mL/hr every 12 hours to reach goal rate of 75 ml/hr x 18 hours per day. Need to hold 1 hour before and 1 hour after carafate administration.   Continue diet per provider and encourage low fat and low fiber foods  Modify ONS: Magic cups (TID) - does not like Glucerna shakes  Started on IV azithromycin for gastroparesis  [facility] referred to [facility] for further care     Malnutrition Assessment:  Malnutrition Status:  Severe malnutrition (04/18/25 8880)    Context:  Acute Illness (on Chronic Illness)     Findings of the 6 clinical characteristics of malnutrition:  Energy Intake:  50% or less of estimated energy requirements for 5 or more days (no intake x5 days; 1 meal a day d/t gastroparesis ongoing for last 3 months)  Weight Loss:   (31# loss in 2 months; however, stated weight this admit)     Body Fat Loss:  Moderate body fat loss Orbital, Buccal region   Muscle Mass Loss:  Mild muscle mass loss Temples (temporalis), Clavicles (pectoralis & deltoids)  Fluid Accumulation:  No fluid accumulation     Strength:  Not Performed    Nutrition Assessment:     Pt. severely malnourished AEB criteria listed above.  At risk for further nutritional compromise r/t admit dehydration 2/2 N/V d/t gastroparesis, orthostatic hypotension, chest pain, abdominal pain, poorly controlled T2DM,  and underlying medical condition (PMHx: bipolar 
Comprehensive Nutrition Assessment    Type and Reason for Visit:  Reassess    Nutrition Recommendations/Plan:   No PO intake overall. If continued, recommend consideration for dobhoff placement in small bowel for feeding.   If tube feeding being considered, suggest: Glucerna 1.5 at  10 mL/hr, advancing 10 mL/hr every 12 hours to reach goal rate of 75 ml/hr x 18 hours per day. Need to hold 1 hour before and 1 hour after carafate administration. Suggest slow feeds due to refeed risk.   Continue diet per provider and encourage low fat and low fiber foods  Discontinue ONS:Pt does not like Glucerna shakes or Magic Cups & declines all other ONS  [facility]     Malnutrition Assessment:  Malnutrition Status:  Severe malnutrition (04/18/25 0888)    Context:  Acute Illness (on Chronic Illness)     Findings of the 6 clinical characteristics of malnutrition:  Energy Intake:  50% or less of estimated energy requirements for 5 or more days (no intake x5 days; 1 meal a day d/t gastroparesis ongoing for last 3 months)  Weight Loss:   (31# loss in 2 months; however, stated weight this admit)     Body Fat Loss:  Moderate body fat loss Orbital, Buccal region   Muscle Mass Loss:  Mild muscle mass loss Temples (temporalis), Clavicles (pectoralis & deltoids)  Fluid Accumulation:  No fluid accumulation     Strength:  Not Performed    Nutrition Assessment:     Pt.not improving from a nutritional standpoint AEB po appears next to null LOS day 5.    At risk for further nutritional compromise r/t admit dehydration 2/2 N/V d/t gastroparesis, orthostatic hypotension, chest pain, abdominal pain, poorly controlled T2DM,  and underlying medical condition (PMHx: bipolar disorder, cancer, depression, nonalcoholic fatty liver, IBS, GERD with esophagitis/gastritis, HTN, HLD, Parkinson's, suicide attempt 2014, IDDM Type II, abnormal thyroid bx - s/p L hemithyroidectomy 2021, Vitamin D Deficiency, gastroparesis, (4/18/25) 
Discharge teaching and instructions for diagnosis/procedure of dehydration completed with patient using teachback method. AVS reviewed. Printed prescriptions given to patient. Patient voiced understanding regarding prescriptions, follow up appointments, and care of self at home. Discharged in a wheelchair to  home with support per self    
Gastroenterology Progress Note:     Patient Name:  Nicolas Mackenzie   MRN: [id number]  [id number]  YOB: 1970  Admit Date: 4/17/2025 11:41 AM  Primary Care Physician: Kesha Hutson MD   8A-20/020-A     Patient seen and examined.  24 hours events and chart reviewed.    Subjective: Today patient reports he is worse. He reports he vomited yesterday and is in chronic abdominal pain, generalized.  He also reports today that he has not had a bowel movement in a week.  He is reporting when he sits on the toilet has now has rectal pain.      Objective:  BP (!) 128/95   Pulse 71   Temp 97.5 °F (36.4 °C) (Oral)   Resp 18   Ht 1.905 m (6' 3\")   Wt 115.2 kg (254 lb)   SpO2 93%   BMI 31.75 kg/m²     Physical Exam  Vitals and nursing note reviewed.   Constitutional:       General: He is not in acute distress.     Appearance: He is obese. He is not ill-appearing.   Eyes:      Pupils: Pupils are equal, round, and reactive to light.   Cardiovascular:      Rate and Rhythm: Normal rate and regular rhythm.      Heart sounds: Normal heart sounds. No murmur heard.  Pulmonary:      Effort: Pulmonary effort is normal. No respiratory distress.      Breath sounds: Normal breath sounds. No stridor. No wheezing, rhonchi or rales.   Chest:      Chest wall: No tenderness.   Abdominal:      General: Abdomen is flat. Bowel sounds are normal. There is no distension.      Palpations: Abdomen is soft. There is no mass.      Tenderness: There is no abdominal tenderness. There is no guarding or rebound.      Hernia: No hernia is present.   Skin:     General: Skin is warm and dry.      Capillary Refill: Capillary refill takes less than 2 seconds.   Neurological:      Mental Status: He is alert and oriented to person, place, and time.   Psychiatric:         Mood and Affect: Mood is depressed. Affect is flat.           Labs:   CBC:   Lab Results   Component Value Date/Time    WBC 5.6 04/22/2025 05:48 AM    HGB 14.0 04/22/2025 05:48 
Gastroenterology Progress Note:     Patient Name:  Nicolas Mackenzie   MRN: [id number]  [id number]  YOB: 1970  Admit Date: 4/17/2025 11:41 AM  Primary Care Physician: Kesha Hutson MD   8A-20/020-A     Patient seen and examined.  24 hours events and chart reviewed.    Subjective: Patient reports he is feeling better today.  No emesis today.  Denies any bowel movements, he does endorse flatus and reports he doesn't feel like he has to have a BM.  Denies rectal pain.  Patient only received one tap water enema, due to timing and then he had taken his ambien and declined it.  He is eating more though today and has had no vomiting.      Objective:  BP (!) 157/91   Pulse 82   Temp 98.1 °F (36.7 °C) (Oral)   Resp 16   Ht 1.905 m (6' 3\")   Wt 115.2 kg (254 lb)   SpO2 95%   BMI 31.75 kg/m²     Physical Exam  Vitals and nursing note reviewed.   Constitutional:       General: He is not in acute distress.     Appearance: Normal appearance. He is not ill-appearing.   HENT:      Mouth/Throat:      Mouth: Mucous membranes are moist.      Pharynx: Oropharynx is clear.   Eyes:      Pupils: Pupils are equal, round, and reactive to light.   Cardiovascular:      Rate and Rhythm: Normal rate and regular rhythm.      Heart sounds: Normal heart sounds. No murmur heard.  Pulmonary:      Effort: Pulmonary effort is normal. No respiratory distress.      Breath sounds: Normal breath sounds. No stridor. No wheezing, rhonchi or rales.   Chest:      Chest wall: No tenderness.   Abdominal:      General: Abdomen is flat. Bowel sounds are normal. There is no distension.      Palpations: Abdomen is soft. There is no mass.      Tenderness: There is no abdominal tenderness. There is no guarding or rebound.      Hernia: No hernia is present.   Skin:     General: Skin is warm and dry.      Capillary Refill: Capillary refill takes less than 2 seconds.   Neurological:      Mental Status: He is alert and oriented to person, place, 
Patient hypoglycemic at lunch time. 180ml of orange juice given.  
Patient refused bed alarm, aware that he is a high fall risk, education provided.   
Patient refused night time tap water enema.  
Post-Fall Assessment  Date of Fall:   4/19/2025  Time of Fall:   1315   Yes No N/A Comment   Was Patient on Falling Star Program? [x] [] []    Was the Fall Witnessed? [] [x] [] Patient called out and said he fell, and hit his head. On assessment patient already in bed.   Were Clothes a Factor? [] [x] []    Was Patient Wearing Corrective Footwear? [x] [] []    Other Environmental Factors Involved? [] [x] []      Description of Fall  Who found the patient: 4/19/2025  Where was the patient at the time of the fall: in room  Brief description of fall: Patient used call light and stated he fell and hit his head  Patient comments regarding fall:   stated \"I felt dizzy, and fell down on the way back from bathroom.   Medications potentially contributing to fall risk (such as Sedatives, Hypnotics, Antihypertensives, Narcotics, Psychotropics, Anticonvulsants):   narcotic, anti-convulsant    Patient Assessment of Injury    (Please document Vital Signs in Doc Flowsheet)     Yes No   Patient hit his/her head [x] []   Patient is taking an anticoagulant [] [x]   CT of Head requested [x] []     Neurological Assessment Protocol:    If the patient has hit his/her head during this fall,   a Neurological Assessment must be completed every 2 hours for 12 hours;   every 3 hours for 24 hours;   then every 4 hours for 24 hours.  Document in Doc Flowsheets.    Neurological Assessment Protocol initiated  yes    If the patient did not hit his/her head during this fall, monitor vital signs every 8 hours.  Notify the physician within 24 hours and document.      Physician Notification  Please document under “Provider Notification” group within the Assessment (Complex Assessment) template of Doc Flowsheets.     Physician notified yes    Pharmacy notified no Time Notified: 1300    House Supervisor/Clinical Manager Notified:   yes  Date:   4/19/2025 Time:   1310  Family Member Notified:   no, patient called his wife   Date:   4/19/2025 Time:   1305 
Receive a page from patient. Stated that he fell  and hit his head against the door on his way back from the bathroom.On assessment, patient A+Ox4, strength equal bilaterally. Patient stated he felt dizzy and fell. Vital obtained. Hospitalist made aware.     Patient is aware that he is at high risk for fall due to his ortho hypotension, and had initially refused bed alarm on admission. Educated provided to patient regarding his diagnosis, he is agreeable to have bed alarm on now, and will call for assistance whenever he is ambulating  
[facility]   PROGRESS NOTE      Patient: Nicolas Mackenzie  Room #: 8A-20/020-A            YOB: 1970  Age: 54 y.o.  Gender: male            Admit Date & Time: 4/17/2025 11:41 AM    Assessment:    The patient declined a visit today.    Interventions:  The patient was provided information about Spiritual Care being available.     Outcomes:  The  wished the patient a positive day.     Plan:  1.Spiritual care will continue to follow the patient according to [facility] spiritual care SOP.       Electronically signed by Chaplain Dejon, on 4/22/2025 at 10:40 AM.  Spiritual Care Department  [facility]  [phone]    
rOPINIRole  1 mg Oral TID    sucralfate  1 g Oral TID     Continuous Infusions:   sodium chloride 75 mL/hr at 04/23/25 3372    sodium chloride      dextrose         ASSESSMENT:  55 yo male who was diagnosed with gastroparesis, GI was asked to see this admission due to decreased ability to eat, only eating one meal per day, along with 50 pound weight loss.  Patient reports he is not taking his Carafate because the pill is too big.       Chronic gastroparesis- eating one meal or less per day, has failed multiple oral medications and nasal sprays- [facility] an appt with [facility].   Last [facility] 1/[facility] with Dr. Garcia showed gastritis and esophagitis  HLD  Insulin dependent diabetes mellitus type 2 poorly controlled.   Seizures  GERD with esophagitis/gastritis- on ppi and carafate however was not taking carafate at home.   HTN  Vitamin D Deficiency        PLAN:     Supportive care per primary  Reglan 10mg IV q 6 hours.   Please dissolve carafate in 1-2 ounces of water prior to administration.   Milk of Magnesia 30 ml daily  Dulcolax suppository daily  Patient was ordered tap water enemas bid twice however he refused all but one of these.   Nursing to monitor and document all stools.    Magnesium citrate was given yesterday however I suspect the patient did not drink this.   Barium enema today given severe constipation without results of any other treatment to evaluate for any obstruction/constipation.   Ok for discharge from GI standpoint once patient has BM.   Follow up with   GI signing off please call with questions/concerns.     Case reviewed and impression/plan reviewed in collaboration with Dr. Enoc Lopez.   Electronically signed by NICANOR Whyte - CNP on 4/24/2025 at 9:55 AM    [employer]            
Q24H    sodium chloride flush  5-40 mL IntraVENous 2 times per day    enoxaparin  30 mg SubCUTAneous BID    cholestyramine light  4 g Oral Daily    DULoxetine  90 mg Oral Daily    gabapentin  600 mg Oral TID    lacosamide  200 mg Oral BID    OXcarbazepine  150 mg Oral BID    pantoprazole  40 mg Oral BID    rOPINIRole  1 mg Oral TID    sucralfate  1 g Oral TID    metoclopramide  10 mg Oral 4x Daily AC & HS     PRN Meds: sodium chloride flush, sodium chloride, potassium chloride **OR** potassium alternative oral replacement **OR** potassium chloride, magnesium sulfate, ondansetron **OR** ondansetron, polyethylene glycol, acetaminophen **OR** acetaminophen, glucose, dextrose bolus **OR** dextrose bolus, glucagon (rDNA), dextrose, hydrALAZINE, oxyCODONE-acetaminophen **OR** oxyCODONE-acetaminophen, promethazine, zolpidem      Intake/Output Summary (Last 24 hours) at 4/20/2025 1025  Last data filed at 4/19/2025 1215  Gross per 24 hour   Intake 300 ml   Output --   Net 300 ml         Diet:  ADULT DIET; Regular; 5 carb choices (75 gm/meal)  ADULT ORAL NUTRITION SUPPLEMENT; Breakfast, Lunch, Dinner; Frozen Oral Supplement    Exam:  /87   Pulse 75   Temp 98.1 °F (36.7 °C) (Oral)   Resp 16   Ht 1.905 m (6' 3\")   Wt 115.2 kg (254 lb)   SpO2 94%   BMI 31.75 kg/m²     General appearance: Awake alert resting on the bed no apparent distress, appears stated age and cooperative.  HEENT: Pupils equal, round, and reactive to light. Conjunctivae/corneas clear.  Neck: Supple, with full range of motion. No jugular venous distention. Trachea midline.  Respiratory:  Normal respiratory effort. Clear to auscultation, bilaterally without Rales/Wheezes/Rhonchi.  Cardiovascular: Regular rate and rhythm with normal S1/S2 without murmurs, rubs or gallops.  Abdomen: Soft, non-tender, non-distended with normal bowel sounds.  Musculoskeletal: passive and active ROM x 4 extremities.  Skin: Skin color, texture, turgor normal.  No rashes 
mL/hr at 04/21/25 0614    sodium chloride      dextrose       Scheduled Medications    insulin lispro  18 Units SubCUTAneous 4x Daily PC & HS    metoclopramide  10 mg IntraVENous Q6H    insulin lispro  0-16 Units SubCUTAneous 4x Daily AC & HS    insulin glargine  50 Units SubCUTAneous BID    sodium chloride flush  5-40 mL IntraVENous 2 times per day    enoxaparin  30 mg SubCUTAneous BID    cholestyramine light  4 g Oral Daily    DULoxetine  90 mg Oral Daily    gabapentin  600 mg Oral TID    lacosamide  200 mg Oral BID    OXcarbazepine  150 mg Oral BID    pantoprazole  40 mg Oral BID    rOPINIRole  1 mg Oral TID    sucralfate  1 g Oral TID    metoclopramide  10 mg Oral 4x Daily AC & HS     PRN Meds: sodium chloride flush, sodium chloride, potassium chloride **OR** potassium alternative oral replacement **OR** potassium chloride, magnesium sulfate, ondansetron **OR** ondansetron, polyethylene glycol, acetaminophen **OR** acetaminophen, glucose, dextrose bolus **OR** dextrose bolus, glucagon (rDNA), dextrose, hydrALAZINE, oxyCODONE-acetaminophen **OR** oxyCODONE-acetaminophen, promethazine, zolpidem      Intake/Output Summary (Last 24 hours) at 4/21/2025 1145  Last data filed at 4/21/2025 1133  Gross per 24 hour   Intake 600 ml   Output 200 ml   Net 400 ml         Diet:  ADULT DIET; Regular; 5 carb choices (75 gm/meal)  ADULT ORAL NUTRITION SUPPLEMENT; Breakfast, Lunch, Dinner; Frozen Oral Supplement    Exam:  /81   Pulse 73   Temp 98.2 °F (36.8 °C) (Oral)   Resp 19   Ht 1.905 m (6' 3\")   Wt 115.2 kg (254 lb)   SpO2 91%   BMI 31.75 kg/m²     General appearance: Awake alert sitting on the bed  no apparent distress, appears stated age and cooperative.  HEENT: Pupils equal, round, and reactive to light. Conjunctivae/corneas clear.  Neck: Supple, with full range of motion. No jugular venous distention. Trachea midline.  Respiratory:  Normal respiratory effort. Clear to auscultation, bilaterally without 
oxyCODONE-acetaminophen (PERCOCET) 5-325 MG per tablet 1 tablet  1 tablet Oral Q8H PRN Florina, Krishan, DO   1 tablet at 04/21/25 0813    Or    oxyCODONE-acetaminophen (PERCOCET) 5-325 MG per tablet 2 tablet  2 tablet Oral Q8H PRN Florina, Krishan, DO   2 tablet at 04/20/25 1300    promethazine (PHENERGAN) 6.25 MG/5ML syrup 6.25 mg  6.25 mg Oral Q6H PRN Florina, Krishan, DO   6.25 mg at 04/20/25 0916    metoclopramide (REGLAN) tablet 10 mg  10 mg Oral 4x Daily AC & HS Wayne Hagen MD   10 mg at 04/21/25 0817    zolpidem (AMBIEN) tablet 10 mg  10 mg Oral Nightly PRN Wayne Hagen MD   10 mg at 04/20/25 2102        Past Medical History:        Diagnosis Date    Abnormal thyroid biopsy     s/p left hemithyroidectomy 1/2021 -benign follicular adenoma    Acid reflux     Anemia     resolved - previously seen by Dr. Jordan (due to enlarged spleen)    Anesthesia     seizures with brain surgery due to blood sugar got really high    Bile reflux gastritis     per EGD 11/16/21 - Dr. Gregorio - to start Carafate.    Bipolar disorder     Cancer (HCC)     Charcot's joint of foot, left     Chest pain     previously seeing [facility] cardiologist, now seeing Dr. Valdez (LAD bridging on cath 4/2016), no CAD per [facility] 6/2022.    Chronic back pain     Chronic bronchitis (HCC)     Dr. Rocha    Colon polyp 08/30/2016    Depression     seeing [facility]    Esophageal abnormality     nodule     Fatty liver disease, nonalcoholic     U/S 11/2013 [facility]    Follicular adenoma of thyroid gland 01/15/2021    s/p lobectomy    Furuncle     legs    History of blood transfusion     History of Doppler ultrasound 05/29/2011    No hemodynamically significant carotid stenosis is identified. A thyroid nodule on each side. Dedicated ultrasound of thyroid gland is suggested to further evaluate if clinically indicated.     History of pulmonary embolism 2007    s/p GFF, related to knee surgery    Hx of blood clots     leg and lung    Hyperammonemia 03/12/2014    Hyperlipidemia     severely 
and negative.    PHYSICAL EXAM:  BP (!) 157/91   Pulse 82   Temp 98.1 °F (36.7 °C) (Oral)   Resp 16   Ht 1.905 m (6' 3\")   Wt 115.2 kg (254 lb)   SpO2 95%   BMI 31.75 kg/m²   General: Chronically ill-appearing, obese,  male, alert, cooperative, in no acute distress, resting in bed on arrival, on room air  Eyes:  PERRLA. Nonicteric, no conjunctivitis, EOMs intact.   HENT: Normocephalic, atraumatic. Nares normal. Oral mucosa moist.  Hearing grossly intact.   Neck: Supple, with full range of motion. No gross JVD appreciated.  No palpable thyroid masses or thyromegaly, s/p left thyroidectomy.  Respiratory:  Normal effort. Clear to auscultation, without rales or wheezes or rhonchi.  Cardiovascular: RRR, good S1/S2 No rubs, gallops.  Murmur present. No lower extremity edema.   Abdomen: Protuberant, soft, nondistended, significant epigastric tenderness to palpation.  Voluntary guarding.  Musculoskeletal: No joint swelling or tenderness. Normal tone. No abnormal movements.  Distortion of feet, consistent with known history of Charcot foot  Skin: Warm and dry. No rashes or lesions.  Neurologic:  No focal sensory/motor deficits in the upper or lower extremities. Cranial nerves:  grossly non-focal 2-12.     Psychiatric: Alert and oriented, normal insight and thought content.   Capillary Refill: Brisk,< 3 seconds.  Peripheral Pulses: weakly palpable, equal bilaterally.     Labs:     Recent Labs     04/21/25  0603 04/22/25  0548 04/23/25  0546   WBC 5.4 5.6 5.2   HGB 13.2* 14.0 13.6*   HCT 38.1* 39.3* 39.5*    173 176     Recent Labs     04/21/25  0603 04/22/25  0548 04/23/25  0546    144 141   K 4.1 4.7 4.2    106 104   CO2 31* 32* 28   BUN 10 9 10   CREATININE 0.8 0.9 0.9   CALCIUM 8.5* 8.8 8.7     No results for input(s): \"AST\", \"ALT\", \"BILIDIR\", \"BILITOT\", \"ALKPHOS\" in the last 72 hours.  No results for input(s): \"INR\" in the last 72 hours.  No results for input(s): \"CKTOTAL\", \"TROPONINI\"

## 2025-04-24 NOTE — CARE COORDINATION
4/24/25, 8:39 AM EDT    DISCHARGE ON GOING EVALUATION    Nicolas SALMERON [facility] day: 5  Location: 8A-20/020-A Reason for admit: Orthostatic hypotension [I95.1]  Dehydration [E86.0]  Weight loss [R63.4]  Hyperglycemia [R73.9]  At high risk for falls [Z91.81]  Chest pain, unspecified type [R07.9]     Procedures: n/a    Imaging since last note: none    Barriers to Discharge: diabetes management, GI, Endocrinology following. CHERY hose, IVF, Lovenox, Zofran prn, pain and nausea control, Protonix, Carafate.     PCP: Kesha Hutson MD  Readmission Risk Score: 12.2    Patient Goals/Plan/Treatment Preferences: Plans home with wife. Current [facility]. Denies needs.     4/24/25, 1:52 PM EDT    Patient goals/plan/ treatment preferences discussed by  and .  Patient goals/plan/ treatment preferences reviewed with patient/ family.  Patient/ family verbalize understanding of discharge plan and are in agreement with goal/plan/treatment preferences.  Understanding was demonstrated using the teach back method.  AVS provided by RN at time of discharge, which includes all necessary medical information pertaining to the patients current course of illness, treatment, post-discharge goals of care, and treatment preferences.     Services At/After Discharge: None

## 2025-04-25 ENCOUNTER — TELEPHONE (OUTPATIENT)
Dept: INTERNAL MEDICINE CLINIC | Age: 55
End: 2025-04-25

## 2025-04-25 ENCOUNTER — CARE COORDINATION (OUTPATIENT)
Dept: CARE COORDINATION | Age: 55
End: 2025-04-25

## 2025-04-25 ENCOUNTER — HOSPITAL ENCOUNTER (OUTPATIENT)
Age: 55
Setting detail: OBSERVATION
Discharge: HOME | End: 2025-04-26
Attending: EMERGENCY MEDICINE
Payer: MEDICARE

## 2025-04-25 DIAGNOSIS — K31.84 GASTROPARESIS: ICD-10-CM

## 2025-04-25 DIAGNOSIS — E86.0 DEHYDRATION: Primary | ICD-10-CM

## 2025-04-25 DIAGNOSIS — R55 SYNCOPE AND COLLAPSE: Primary | ICD-10-CM

## 2025-04-25 DIAGNOSIS — R79.89 ACTH ELEVATION: ICD-10-CM

## 2025-04-25 LAB
ANION GAP SERPL CALC-SCNC: 10 MEQ/L (ref 8–16)
BASOPHILS ABSOLUTE: 0 THOU/MM3 (ref 0–0.1)
BASOPHILS NFR BLD AUTO: 0.5 %
BUN SERPL-MCNC: 10 MG/DL (ref 8–23)
CALCIUM SERPL-MCNC: 9.5 MG/DL (ref 8.6–10)
CHLORIDE SERPL-SCNC: 101 MEQ/L (ref 98–111)
CO2 SERPL-SCNC: 28 MEQ/L (ref 22–29)
CREAT SERPL-MCNC: 0.9 MG/DL (ref 0.7–1.2)
DEPRECATED RDW RBC AUTO: 39.3 FL (ref 35–45)
EOSINOPHIL NFR BLD AUTO: 3.4 %
EOSINOPHILS ABSOLUTE: 0.2 THOU/MM3 (ref 0–0.4)
ERYTHROCYTE [DISTWIDTH] IN BLOOD BY AUTOMATED COUNT: 12.9 % (ref 11.5–14.5)
GFR SERPL CREATININE-BSD FRML MDRD: > 90 ML/MIN/1.73M2
GLUCOSE SERPL-MCNC: 358 MG/DL (ref 74–109)
HCT VFR BLD AUTO: 44.8 % (ref 42–52)
HGB BLD-MCNC: 16.2 GM/DL (ref 14–18)
IMM GRANULOCYTES # BLD AUTO: 0.03 THOU/MM3 (ref 0–0.07)
IMM GRANULOCYTES NFR BLD AUTO: 0.5 %
LYMPHOCYTES ABSOLUTE: 1.7 THOU/MM3 (ref 1–4.8)
LYMPHOCYTES NFR BLD AUTO: 26.2 %
MCH RBC QN AUTO: 31.1 PG (ref 26–33)
MCHC RBC AUTO-ENTMCNC: 36.2 GM/DL (ref 32.2–35.5)
MCV RBC AUTO: 86 FL (ref 80–94)
MONOCYTES ABSOLUTE: 0.5 THOU/MM3 (ref 0.4–1.3)
MONOCYTES NFR BLD AUTO: 7.6 %
NEUTROPHILS ABSOLUTE: 4 THOU/MM3 (ref 1.8–7.7)
NEUTROPHILS NFR BLD AUTO: 61.8 %
NRBC BLD AUTO-RTO: 0 /100 WBC
OSMOLALITY SERPL CALC.SUM OF ELEC: 291 MOSMOL/KG (ref 275–300)
PLATELET # BLD AUTO: 236 THOU/MM3 (ref 130–400)
PMV BLD AUTO: 9.7 FL (ref 9.4–12.4)
POTASSIUM SERPL-SCNC: 4.7 MEQ/L (ref 3.5–5.2)
RBC # BLD AUTO: 5.21 MILL/MM3 (ref 4.7–6.1)
SODIUM SERPL-SCNC: 139 MEQ/L (ref 135–145)
WBC # BLD AUTO: 6.4 THOU/MM3 (ref 4.8–10.8)

## 2025-04-25 PROCEDURE — 80048 BASIC METABOLIC PNL TOTAL CA: CPT

## 2025-04-25 PROCEDURE — 36415 COLL VENOUS BLD VENIPUNCTURE: CPT

## 2025-04-25 PROCEDURE — 99222 1ST HOSP IP/OBS MODERATE 55: CPT | Performed by: INTERNAL MEDICINE

## 2025-04-25 PROCEDURE — 93005 ELECTROCARDIOGRAM TRACING: CPT | Performed by: EMERGENCY MEDICINE

## 2025-04-25 PROCEDURE — G0378 HOSPITAL OBSERVATION PER HR: HCPCS

## 2025-04-25 PROCEDURE — 6370000000 HC RX 637 (ALT 250 FOR IP): Performed by: INTERNAL MEDICINE

## 2025-04-25 PROCEDURE — 6360000002 HC RX W HCPCS: Performed by: INTERNAL MEDICINE

## 2025-04-25 PROCEDURE — 85025 COMPLETE CBC W/AUTO DIFF WBC: CPT

## 2025-04-25 PROCEDURE — 2580000003 HC RX 258: Performed by: INTERNAL MEDICINE

## 2025-04-25 PROCEDURE — 99285 EMERGENCY DEPT VISIT HI MDM: CPT

## 2025-04-25 PROCEDURE — 96361 HYDRATE IV INFUSION ADD-ON: CPT

## 2025-04-25 PROCEDURE — 96372 THER/PROPH/DIAG INJ SC/IM: CPT

## 2025-04-25 PROCEDURE — 6370000000 HC RX 637 (ALT 250 FOR IP)

## 2025-04-25 PROCEDURE — 1111F DSCHRG MED/CURRENT MED MERGE: CPT | Performed by: INTERNAL MEDICINE

## 2025-04-25 RX ORDER — OXYCODONE AND ACETAMINOPHEN 10; 325 MG/1; MG/1
1 TABLET ORAL 3 TIMES DAILY
COMMUNITY

## 2025-04-25 RX ORDER — POTASSIUM CHLORIDE 1500 MG/1
40 TABLET, EXTENDED RELEASE ORAL PRN
Status: DISCONTINUED | OUTPATIENT
Start: 2025-04-25 | End: 2025-04-26 | Stop reason: HOSPADM

## 2025-04-25 RX ORDER — LACOSAMIDE 200 MG/1
200 TABLET ORAL 2 TIMES DAILY
Status: DISCONTINUED | OUTPATIENT
Start: 2025-04-25 | End: 2025-04-26 | Stop reason: HOSPADM

## 2025-04-25 RX ORDER — SUCRALFATE 1 G/1
1 TABLET ORAL 3 TIMES DAILY
Status: DISCONTINUED | OUTPATIENT
Start: 2025-04-25 | End: 2025-04-26 | Stop reason: HOSPADM

## 2025-04-25 RX ORDER — SODIUM CHLORIDE 0.9 % (FLUSH) 0.9 %
5-40 SYRINGE (ML) INJECTION EVERY 12 HOURS SCHEDULED
Status: DISCONTINUED | OUTPATIENT
Start: 2025-04-25 | End: 2025-04-26 | Stop reason: HOSPADM

## 2025-04-25 RX ORDER — METOCLOPRAMIDE 10 MG/1
10 TABLET ORAL 4 TIMES DAILY
Status: DISCONTINUED | OUTPATIENT
Start: 2025-04-25 | End: 2025-04-26 | Stop reason: HOSPADM

## 2025-04-25 RX ORDER — ENOXAPARIN SODIUM 100 MG/ML
30 INJECTION SUBCUTANEOUS 2 TIMES DAILY
Status: DISCONTINUED | OUTPATIENT
Start: 2025-04-25 | End: 2025-04-26 | Stop reason: HOSPADM

## 2025-04-25 RX ORDER — ACETAMINOPHEN 325 MG/1
650 TABLET ORAL EVERY 6 HOURS PRN
Status: DISCONTINUED | OUTPATIENT
Start: 2025-04-25 | End: 2025-04-26 | Stop reason: HOSPADM

## 2025-04-25 RX ORDER — SODIUM CHLORIDE 0.9 % (FLUSH) 0.9 %
10 SYRINGE (ML) INJECTION PRN
Status: DISCONTINUED | OUTPATIENT
Start: 2025-04-25 | End: 2025-04-26 | Stop reason: HOSPADM

## 2025-04-25 RX ORDER — OXCARBAZEPINE 300 MG/1
150 TABLET, FILM COATED ORAL 2 TIMES DAILY
Status: DISCONTINUED | OUTPATIENT
Start: 2025-04-25 | End: 2025-04-26 | Stop reason: HOSPADM

## 2025-04-25 RX ORDER — GABAPENTIN 600 MG/1
600 TABLET ORAL 3 TIMES DAILY
Status: DISCONTINUED | OUTPATIENT
Start: 2025-04-25 | End: 2025-04-26 | Stop reason: HOSPADM

## 2025-04-25 RX ORDER — DULOXETIN HYDROCHLORIDE 30 MG/1
30 CAPSULE, DELAYED RELEASE ORAL DAILY
Status: DISCONTINUED | OUTPATIENT
Start: 2025-04-26 | End: 2025-04-26 | Stop reason: HOSPADM

## 2025-04-25 RX ORDER — ONDANSETRON 4 MG/1
4 TABLET, ORALLY DISINTEGRATING ORAL EVERY 8 HOURS PRN
Status: DISCONTINUED | OUTPATIENT
Start: 2025-04-25 | End: 2025-04-26 | Stop reason: HOSPADM

## 2025-04-25 RX ORDER — MAGNESIUM SULFATE IN WATER 40 MG/ML
2000 INJECTION, SOLUTION INTRAVENOUS PRN
Status: DISCONTINUED | OUTPATIENT
Start: 2025-04-25 | End: 2025-04-26 | Stop reason: HOSPADM

## 2025-04-25 RX ORDER — SODIUM CHLORIDE 9 MG/ML
INJECTION, SOLUTION INTRAVENOUS CONTINUOUS
Status: ACTIVE | OUTPATIENT
Start: 2025-04-25 | End: 2025-04-26

## 2025-04-25 RX ORDER — INSULIN GLARGINE 100 [IU]/ML
36 INJECTION, SOLUTION SUBCUTANEOUS 2 TIMES DAILY
Status: DISCONTINUED | OUTPATIENT
Start: 2025-04-25 | End: 2025-04-26 | Stop reason: HOSPADM

## 2025-04-25 RX ORDER — ZOLPIDEM TARTRATE 5 MG/1
5 TABLET ORAL NIGHTLY
Status: DISCONTINUED | OUTPATIENT
Start: 2025-04-25 | End: 2025-04-26 | Stop reason: HOSPADM

## 2025-04-25 RX ORDER — OXYCODONE AND ACETAMINOPHEN 10; 325 MG/1; MG/1
1 TABLET ORAL ONCE
Refills: 0 | Status: COMPLETED | OUTPATIENT
Start: 2025-04-25 | End: 2025-04-25

## 2025-04-25 RX ORDER — POTASSIUM CHLORIDE 7.45 MG/ML
10 INJECTION INTRAVENOUS PRN
Status: DISCONTINUED | OUTPATIENT
Start: 2025-04-25 | End: 2025-04-26 | Stop reason: HOSPADM

## 2025-04-25 RX ORDER — POLYETHYLENE GLYCOL 3350 17 G/17G
17 POWDER, FOR SOLUTION ORAL DAILY PRN
Status: DISCONTINUED | OUTPATIENT
Start: 2025-04-25 | End: 2025-04-26 | Stop reason: HOSPADM

## 2025-04-25 RX ORDER — ONDANSETRON 2 MG/ML
4 INJECTION INTRAMUSCULAR; INTRAVENOUS EVERY 6 HOURS PRN
Status: DISCONTINUED | OUTPATIENT
Start: 2025-04-25 | End: 2025-04-26 | Stop reason: HOSPADM

## 2025-04-25 RX ORDER — SODIUM CHLORIDE 9 MG/ML
INJECTION, SOLUTION INTRAVENOUS PRN
Status: DISCONTINUED | OUTPATIENT
Start: 2025-04-25 | End: 2025-04-26 | Stop reason: HOSPADM

## 2025-04-25 RX ORDER — ACETAMINOPHEN 650 MG/1
650 SUPPOSITORY RECTAL EVERY 6 HOURS PRN
Status: DISCONTINUED | OUTPATIENT
Start: 2025-04-25 | End: 2025-04-26 | Stop reason: HOSPADM

## 2025-04-25 RX ORDER — ZOLPIDEM TARTRATE 5 MG/1
5 TABLET ORAL ONCE
Status: COMPLETED | OUTPATIENT
Start: 2025-04-25 | End: 2025-04-25

## 2025-04-25 RX ADMIN — ZOLPIDEM TARTRATE 5 MG: 5 TABLET, FILM COATED ORAL at 22:08

## 2025-04-25 RX ADMIN — METOCLOPRAMIDE 10 MG: 10 TABLET ORAL at 22:08

## 2025-04-25 RX ADMIN — GABAPENTIN 600 MG: 600 TABLET, FILM COATED ORAL at 22:08

## 2025-04-25 RX ADMIN — ZOLPIDEM TARTRATE 5 MG: 5 TABLET, FILM COATED ORAL at 22:23

## 2025-04-25 RX ADMIN — SUCRALFATE 1 G: 1 TABLET ORAL at 22:08

## 2025-04-25 RX ADMIN — INSULIN GLARGINE 36 UNITS: 100 INJECTION, SOLUTION SUBCUTANEOUS at 22:07

## 2025-04-25 RX ADMIN — OXCARBAZEPINE 150 MG: 300 TABLET, FILM COATED ORAL at 22:08

## 2025-04-25 RX ADMIN — OXYCODONE AND ACETAMINOPHEN 1 TABLET: 10; 325 TABLET ORAL at 22:08

## 2025-04-25 RX ADMIN — ENOXAPARIN SODIUM 30 MG: 100 INJECTION SUBCUTANEOUS at 22:08

## 2025-04-25 RX ADMIN — LACOSAMIDE 200 MG: 200 TABLET, FILM COATED ORAL at 22:08

## 2025-04-25 RX ADMIN — SODIUM CHLORIDE: 0.9 INJECTION, SOLUTION INTRAVENOUS at 18:18

## 2025-04-25 ASSESSMENT — PAIN DESCRIPTION - INTENSITY
RATING_2: 6
RATING_2: 6

## 2025-04-25 ASSESSMENT — PAIN DESCRIPTION - PAIN TYPE
TYPE: ACUTE PAIN
TYPE: ACUTE PAIN

## 2025-04-25 ASSESSMENT — PAIN DESCRIPTION - ORIENTATION
ORIENTATION: LEFT;RIGHT
ORIENTATION_2: RIGHT;LEFT
ORIENTATION: MID
ORIENTATION: MID
ORIENTATION: RIGHT;LEFT
ORIENTATION_2: RIGHT;LEFT

## 2025-04-25 ASSESSMENT — PAIN - FUNCTIONAL ASSESSMENT
PAIN_FUNCTIONAL_ASSESSMENT: 0-10
PAIN_FUNCTIONAL_ASSESSMENT_SITE2: ACTIVITIES ARE NOT PREVENTED
PAIN_FUNCTIONAL_ASSESSMENT_SITE2: ACTIVITIES ARE NOT PREVENTED
PAIN_FUNCTIONAL_ASSESSMENT: ACTIVITIES ARE NOT PREVENTED
PAIN_FUNCTIONAL_ASSESSMENT: ACTIVITIES ARE NOT PREVENTED

## 2025-04-25 ASSESSMENT — PAIN DESCRIPTION - LOCATION
LOCATION_2: FOOT
LOCATION: ABDOMEN;LEG;FOOT
LOCATION: FOOT
LOCATION: ABDOMEN
LOCATION_2: FOOT
LOCATION: ABDOMEN

## 2025-04-25 ASSESSMENT — PAIN SCALES - WONG BAKER: WONGBAKER_NUMERICALRESPONSE: NO HURT

## 2025-04-25 ASSESSMENT — PAIN SCALES - GENERAL
PAINLEVEL_OUTOF10: 6
PAINLEVEL_OUTOF10: 6
PAINLEVEL_OUTOF10: 7
PAINLEVEL_OUTOF10: 6
PAINLEVEL_OUTOF10: 6

## 2025-04-25 ASSESSMENT — PAIN DESCRIPTION - DESCRIPTORS
DESCRIPTORS_2: ACHING;DISCOMFORT
DESCRIPTORS: ACHING;DISCOMFORT
DESCRIPTORS: ACHING
DESCRIPTORS_2: ACHING;DISCOMFORT
DESCRIPTORS: ACHING

## 2025-04-25 ASSESSMENT — PAIN DESCRIPTION - FREQUENCY: FREQUENCY: CONTINUOUS

## 2025-04-25 ASSESSMENT — PAIN DESCRIPTION - ONSET
ONSET: ON-GOING
ONSET: ON-GOING

## 2025-04-25 NOTE — ED TRIAGE NOTES
Presents to ED with c/o fall yesterday. Patient states he fell and passed out yesterday around 1830. Patient states he was in the kitchen when he fell and hit his head on the wall. Patient alert and oriented. Respirations easy and unlabored.

## 2025-04-25 NOTE — ED PROVIDER NOTES
[facility]      CHIEF COMPLAINT       Chief Complaint   Patient presents with    Fall       Nurses Notes reviewed and I agree except as noted in the HPI.      HISTORY OF PRESENT ILLNESS    Nicolas Mackenzie is a 54 y.o. male who presents with complaint of having had a fall yesterday, states that he was cooking dinner when he lost consciousness and fell.  Patient had just been discharged from the hospital a few hours prior to the fall.  Patient states that he called his PCPs office as a result of the fall, and he he was told to his wife to come back to the hospital to have his cortisol and ACTH stim test done in the ED, also get a CAT scan because of the fall.  Patient denies any headaches, no neck pain.  States that he has fallen in the past because his blood pressure normally drops at times.  Onset: Acute  Duration: Fell yesterday  Timing: Single event  Location of Pain: No neck pain, no headaches  Intesity/severity: Single fall, but reports chronic history of intermittent falls  Modifying Factors: History of intermittent low blood pressures  Relieved by;  Previous Episodes;  Tx Before arrival: None    PAST MEDICAL HISTORY    has a past medical history of Abnormal thyroid biopsy, Acid reflux, Anemia, Anesthesia, Bile reflux gastritis, Bipolar disorder (HCC), Cancer (HCC), Charcot's joint of foot, left, Chest pain, Chronic back pain, Chronic bronchitis (HCC), Colon polyp, Depression, Esophageal abnormality, Fatty liver disease, nonalcoholic, Follicular adenoma of thyroid gland, Furuncle, History of blood transfusion, History of Doppler ultrasound, History of pulmonary embolism, Hx of blood clots, Hyperammonemia, Hyperlipidemia, Hypertension, Intracranial arachnoid cyst, Irritable bowel syndrome, Kidney failure, Lithium toxicity, Liver disease, MDRO (multiple drug resistant organisms) resistance, Medication overdose, insulin, MRSA (methicillin resistant staph aureus) culture positive, MRSA infection

## 2025-04-25 NOTE — TELEPHONE ENCOUNTER
Agree - patient was admitted.  Reviewed hospitalization.  Just discharged.  Will need to see next week - do I still have that Tues appt open.   If I have something - call him and see if he wants in with me or ok with resident clinic.  If he won't see me with what I have - need to see resident.

## 2025-04-25 NOTE — ED NOTES
ED to inpatient nurses report      Chief Complaint:  Chief Complaint   Patient presents with    Fall       MOA:     LOC: alert and orientated to name, place, date  Mobility: Requires assistance * 1  Oxygen Baseline: room air    Current needs required: none     Code Status:   Prior      Mental Status:  Level of Consciousness: Alert (0)    Psych Assessment:        Vitals:  Patient Vitals for the past 24 hrs:   BP Temp Temp src Pulse Resp SpO2 Weight   04/25/25 1556 127/87 -- -- 77 13 95 % --   04/25/25 1432 (!) 165/101 98.8 °F (37.1 °C) Oral 85 15 95 % 115.2 kg (254 lb)        LDAs:   Peripheral IV 04/25/25 Right Antecubital (Active)   Site Assessment Clean, dry & intact 04/25/25 1434       Ambulatory Status:  No data recorded    Diagnosis:  DISPOSITION Admitted 04/25/2025 05:12:26 PM   Final diagnoses:   None        Consults:  None     Pain Score:  Pain Assessment  Pain Assessment: 0-10  Pain Level: 6  Pain Location: Foot  Pain Orientation: Left, Right    C-SSRS:        Sepsis Screening:       Rodney Fall Risk:       Swallow Screening        Preferred Language:   English      ALLERGIES     Ciprofloxacin-ciproflox hcl er, Metformin, Niacin and related, Tramadol hcl, Ultram [tramadol], Warfarin, Other, Vancomycin hcl, and Trulicity [dulaglutide]    SURGICAL HISTORY       Past Surgical History:   Procedure Laterality Date    CARDIAC CATHETERIZATION      2011,5-6 years ago    CARDIAC CATHETERIZATION  03/2012    CARDIAC CATHETERIZATION  04/28/2016    [facility]     CARPAL TUNNEL RELEASE  01/2017    CHOLECYSTECTOMY  2004    COLONOSCOPY  2012    COLONOSCOPY  08/2016    2 tubular adenomas - reportedly needs repeat scope in 6 months    COLONOSCOPY N/A 11/01/2022    COLONOSCOPY POLYPECTOMY SNARE/COLD BIOPSY performed by Tk Gregorio MD at [facility]    CRANIOTOMY  11/2012    arachnoid cyst drainage    EKG 12-LEAD  10/18/2015         ENDOSCOPY, COLON, DIAGNOSTIC      FOOT SURGERY  05/20/2022    right foot metatarsal phalangeal  joint resection and placement of antibiotic beads, left foot Achilles tendon lengthening    FOOT SURGERY Right 02/03/2023    toe amputation    HERNIA REPAIR Right 1996    [facility]--Dr. David    INGUINAL HERNIA REPAIR Right 09/15/2016    Robotic assisted    KNEE ARTHROSCOPY Right 2016    KNEE SURGERY Left 2007    acl and debrided twice    OTHER SURGICAL HISTORY  05/31/2011    Tilt table was associated w/ nonspecific symptoms or nausea, otherwise no significant dizziness or syncope. No significant hemodynamic changes. Otherwise, unremarkable tilt table test after 30 minutes of tilting.     OTHER SURGICAL HISTORY  01/20/2017    RIGHT SHOULDER ARTHROSCOPY, OPEN STAN, OPEN ACROMIOPLASTY, BICEP TENDESIS, RIGHT CARPAL TUNNEL RELEASE    SHOULDER SURGERY Right 01/2007    THYROID LOBECTOMY N/A 01/15/2021    THYROID LOBECTOMY, UVULOPALATOPHARYNGOPLAST LAOP performed by Tyrone Mon MD at [facility] OR    TONSILLECTOMY      TRANSTHORACIC ECHOCARDIOGRAM  03/05/2011    LV size and systolic function normal. EF 55-65%.     UPPER GASTROINTESTINAL ENDOSCOPY  2012    UPPER GASTROINTESTINAL ENDOSCOPY  2016    Dr. Gregorio    UPPER GASTROINTESTINAL ENDOSCOPY Left 10/22/2019    EGD DILATION SAVORY performed by Tk Gregorio MD at [facility] Endoscopy    UPPER GASTROINTESTINAL ENDOSCOPY Left 10/22/2019    EGD BIOPSY performed by Tk Gregorio MD at [facility]oscopy    UPPER GASTROINTESTINAL ENDOSCOPY N/A 11/16/2021    EGD performed by Tk Gregorio MD at [facility] Endoscopy    UPPER GASTROINTESTINAL ENDOSCOPY Left 11/16/2021    EGD BIOPSY performed by Tk Gregorio MD at [facility]oscopy    UPPER GASTROINTESTINAL ENDOSCOPY N/A 4/2/2024    ESOPHAGOGASTRODUODENOSCOPY BIOPSY performed by Tk Gregorio MD at [facility] ENDOSCOPY    VENA CAVA FILTER PLACEMENT  2007       PAST MEDICAL HISTORY       Past Medical History:   Diagnosis Date    Abnormal thyroid biopsy     s/p left hemithyroidectomy 1/2021 -benign follicular adenoma    Acid reflux     Anemia

## 2025-04-25 NOTE — TELEPHONE ENCOUNTER
Patient called in for low BP. His phone kept cutting in and out. I asked him what Dr Hutson has told him in the past to prevent his BP from dropping. Advised him to keep doing those until his appt. I could barely hear him, he hung up the phone on me. I attempted to reach him again but he did not .

## 2025-04-25 NOTE — H&P
History & Physical    Patient:  Nicolas Mackenzie  YOB: 1970  Date of Service: 4/25/2025  MRN: [id number]   Acct:  [id number]   Primary Care Physician: Kesha Hutson MD    Chief Complaint: Syncope    History of Present Illness:   History obtained from the patient.    The patient is a 54 y.o. male who presents with an episode of syncope after having been discharged in the hospital in good condition 4/24/2025.  The patient had been hospitalized secondary to symptoms related to chronic gastroparesis secondary to poorly controlled diabetes mellitus type 2 the patient also has suspected autonomic dysfunction secondary to poorly controlled diabetes mellitus type 2, he was followed by endocrine services and previous hospital stay and required transient decrease in his Lantus dosing secondary to transient hypoglycemia, his constipation was treated, he had atypical chest pain and previous hospitalization that was negative for any acute coronary syndrome with cardiology having evaluated the patient he also had no episodes of generalized seizure despite having a history of chronic seizure disorder, on this hospitalization he has been reevaluated secondary having had an episode of syncope at home that was witnessed by family members, on my physical survey he has no tenderness to palpation in the cervical neck and he has no ecchymoses on his scalp as a precaution I will order CT of the head to ensure there is no intracranial hemorrhage he does not have any symptoms of having had a concussion either, patient's PCP also was able to review the patient's laboratory values and was concerned for possible underlying adrenal sufficiency and was in contact with the emergency room physician who requested admission based on possible underlying adrenal sufficiency, review of laboratory values from April 19, 2025 shows a suppressed ACTH, I have been able to contact endocrine services over the phone and will request  MRSA (methicillin resistant staph aureus) culture positive     h/o in foot and before brain surgery    MRSA infection within last 3 months 11/10/2021    Nausea & vomiting 12/26/2014    Nephrolithiasis     noted on CT abdomen 9/2016, 6/2019, left renal calculi 2/2023 per renal u/s at [facility]    Neuropathy     MISTI (obstructive sleep apnea) 01/16/2021    Pancreatic insufficiency     Parkinson disease (HCC)     Positive SANDY (antinuclear antibody)     Dr. Maharaj - first visit in 6/2013    Prolonged emergence from general anesthesia     Restless legs syndrome     Seizures (HCC)     started with brain surgery    Sinus tachycardia     Holter 3/2013 - seeing Dr. Valdez    Skull fracture ([facility])     clips     Sleep apnea     positive sleep apnea per study 10/2020, s/p UPPP surgery 2021    Suicide attempt ([facility]) 07/16/2014    Type II or unspecified type diabetes mellitus without mention of complication, not stated as uncontrolled 2007       Past Surgical History:        Procedure Laterality Date    CARDIAC CATHETERIZATION      2011,5-6 years ago    CARDIAC CATHETERIZATION  03/2012    CARDIAC CATHETERIZATION  04/28/2016    [facility]     CARPAL TUNNEL RELEASE  01/2017    CHOLECYSTECTOMY  2004    COLONOSCOPY  2012    COLONOSCOPY  08/2016    2 tubular adenomas - reportedly needs repeat scope in 6 months    COLONOSCOPY N/A 11/01/2022    COLONOSCOPY POLYPECTOMY SNARE/COLD BIOPSY performed by Tk Gregorio MD at [facility]    CRANIOTOMY  11/2012    arachnoid cyst drainage    EKG 12-LEAD  10/18/2015         ENDOSCOPY, COLON, DIAGNOSTIC      FOOT SURGERY  05/20/2022    right foot metatarsal phalangeal joint resection and placement of antibiotic beads, left foot Achilles tendon lengthening    FOOT SURGERY Right 02/03/2023    toe amputation    HERNIA REPAIR Right 1996    [facility]--Dr. David    INGUINAL HERNIA REPAIR Right 09/15/2016    Robotic assisted    KNEE ARTHROSCOPY Right 2016    KNEE SURGERY Left 2007    acl and debrided twice    OTHER

## 2025-04-25 NOTE — CARE COORDINATION
Will route her message.  He continues to monitor his BP at least 4 times per day.  Reports he did not have any dizziness or lightheadedness prior to the syncopal episode.  Denies having any headaches.  Continues to have chest pain periodically.  May have nausea on occasion.  Abdominal pain still present.  Still unable to eat a whole lot.  Eating smaller portions at a time.  He is drinking his fluids and trying to stay hydrated.  States fluids typically do not cause any issues but may at times.  Blood sugar was 190.  Reviewed discharge instructions.  Per discharge instructions, take it easy on diet and eat soft bland foods.  Pt states he is eating whatever he cooks for the family.  He states he typically knows what he can and cannot tolerate.  Pt currently urinating w/o issues.  Reports he is currently having diarrhea d/t IBS.  Pt reports his spouse is an RN at [employer].  Reviewed medication list.  Pt states he is NOT taking the 36 units but instead taking 80 units BID of the Lantus.  Also states he is NOT taking 12 units of the Humalog but instead taking 35 units plus the sliding scale.  He states he goes to the diabetic clinic and will follow their instructions.  Advised he clarify dosage with the clinic.  Pt also states he does NOT plan to stop the Percocet which is prescribed by the pain clinic.  Pt reports he is also taking Gimoti nasal spray 3 times a day when he eats.  Pt has an appt with PCP on 4/29/25, IU (GI) on 5/8/25 and diabetic clinic on 5/13/25.  He plans to drive himself as long as he is feeling okay.  He states he is aware of his limits.  He is aware of when to contact providers.  No questions, concerns or needs at this time.  He is agreeable to follow up calls.  He states he has several appts at [facility] next week but okay to call.      Care Transition Nurse reviewed discharge instructions and medical action plan with patient. The patient was given an opportunity to ask questions; no

## 2025-04-26 VITALS
RESPIRATION RATE: 20 BRPM | HEIGHT: 75 IN | OXYGEN SATURATION: 94 % | TEMPERATURE: 97.5 F | WEIGHT: 254 LBS | DIASTOLIC BLOOD PRESSURE: 76 MMHG | BODY MASS INDEX: 31.58 KG/M2 | SYSTOLIC BLOOD PRESSURE: 138 MMHG | HEART RATE: 72 BPM

## 2025-04-26 LAB
ALBUMIN SERPL BCG-MCNC: 3.5 G/DL (ref 3.4–4.9)
ALP SERPL-CCNC: 85 U/L (ref 40–129)
ALT SERPL W/O P-5'-P-CCNC: 25 U/L (ref 10–50)
ANION GAP SERPL CALC-SCNC: 8 MEQ/L (ref 8–16)
AST SERPL-CCNC: 23 U/L (ref 10–50)
BASOPHILS ABSOLUTE: 0 THOU/MM3 (ref 0–0.1)
BASOPHILS NFR BLD AUTO: 0.4 %
BILIRUB SERPL-MCNC: 0.7 MG/DL (ref 0.3–1.2)
BUN SERPL-MCNC: 9 MG/DL (ref 8–23)
CALCIUM SERPL-MCNC: 8.7 MG/DL (ref 8.6–10)
CHLORIDE SERPL-SCNC: 103 MEQ/L (ref 98–111)
CO2 SERPL-SCNC: 27 MEQ/L (ref 22–29)
CORTIS SERPL-MCNC: 10.7 UG/DL
CORTIS SERPL-MCNC: 23 UG/DL
CORTISOL COLLECTION INFO: NORMAL
CORTISOL COLLECTION INFO: NORMAL
CREAT SERPL-MCNC: 0.9 MG/DL (ref 0.7–1.2)
DEPRECATED RDW RBC AUTO: 40.4 FL (ref 35–45)
EKG ATRIAL RATE: 86 BPM
EKG P AXIS: 17 DEGREES
EKG P-R INTERVAL: 182 MS
EKG Q-T INTERVAL: 354 MS
EKG QRS DURATION: 84 MS
EKG QTC CALCULATION (BAZETT): 423 MS
EKG R AXIS: 26 DEGREES
EKG T AXIS: 32 DEGREES
EKG VENTRICULAR RATE: 86 BPM
EOSINOPHIL NFR BLD AUTO: 5.5 %
EOSINOPHILS ABSOLUTE: 0.3 THOU/MM3 (ref 0–0.4)
ERYTHROCYTE [DISTWIDTH] IN BLOOD BY AUTOMATED COUNT: 13 % (ref 11.5–14.5)
GFR SERPL CREATININE-BSD FRML MDRD: > 90 ML/MIN/1.73M2
GLUCOSE SERPL-MCNC: 344 MG/DL (ref 74–109)
HCT VFR BLD AUTO: 38.5 % (ref 42–52)
HGB BLD-MCNC: 13.6 GM/DL (ref 14–18)
IMM GRANULOCYTES # BLD AUTO: 0.02 THOU/MM3 (ref 0–0.07)
IMM GRANULOCYTES NFR BLD AUTO: 0.4 %
LYMPHOCYTES ABSOLUTE: 1.8 THOU/MM3 (ref 1–4.8)
LYMPHOCYTES NFR BLD AUTO: 36 %
MCH RBC QN AUTO: 30.8 PG (ref 26–33)
MCHC RBC AUTO-ENTMCNC: 35.3 GM/DL (ref 32.2–35.5)
MCV RBC AUTO: 87.1 FL (ref 80–94)
MONOCYTES ABSOLUTE: 0.4 THOU/MM3 (ref 0.4–1.3)
MONOCYTES NFR BLD AUTO: 8.3 %
NEUTROPHILS ABSOLUTE: 2.4 THOU/MM3 (ref 1.8–7.7)
NEUTROPHILS NFR BLD AUTO: 49.4 %
NRBC BLD AUTO-RTO: 0 /100 WBC
PLATELET # BLD AUTO: 182 THOU/MM3 (ref 130–400)
PMV BLD AUTO: 9.6 FL (ref 9.4–12.4)
POTASSIUM SERPL-SCNC: 4.8 MEQ/L (ref 3.5–5.2)
PROT SERPL-MCNC: 5.8 G/DL (ref 6.4–8.3)
RBC # BLD AUTO: 4.42 MILL/MM3 (ref 4.7–6.1)
SODIUM SERPL-SCNC: 138 MEQ/L (ref 135–145)
WBC # BLD AUTO: 4.9 THOU/MM3 (ref 4.8–10.8)

## 2025-04-26 PROCEDURE — 96361 HYDRATE IV INFUSION ADD-ON: CPT

## 2025-04-26 PROCEDURE — 80053 COMPREHEN METABOLIC PANEL: CPT

## 2025-04-26 PROCEDURE — 82533 TOTAL CORTISOL: CPT

## 2025-04-26 PROCEDURE — 85025 COMPLETE CBC W/AUTO DIFF WBC: CPT

## 2025-04-26 PROCEDURE — 93010 ELECTROCARDIOGRAM REPORT: CPT | Performed by: INTERNAL MEDICINE

## 2025-04-26 PROCEDURE — 6370000000 HC RX 637 (ALT 250 FOR IP): Performed by: INTERNAL MEDICINE

## 2025-04-26 PROCEDURE — 99239 HOSP IP/OBS DSCHRG MGMT >30: CPT | Performed by: PHYSICIAN ASSISTANT

## 2025-04-26 PROCEDURE — 36415 COLL VENOUS BLD VENIPUNCTURE: CPT

## 2025-04-26 PROCEDURE — G0378 HOSPITAL OBSERVATION PER HR: HCPCS

## 2025-04-26 PROCEDURE — 96365 THER/PROPH/DIAG IV INF INIT: CPT

## 2025-04-26 PROCEDURE — 96372 THER/PROPH/DIAG INJ SC/IM: CPT

## 2025-04-26 PROCEDURE — 6360000002 HC RX W HCPCS: Performed by: INTERNAL MEDICINE

## 2025-04-26 PROCEDURE — 2500000003 HC RX 250 WO HCPCS: Performed by: INTERNAL MEDICINE

## 2025-04-26 RX ORDER — INSULIN LISPRO 100 [IU]/ML
12 INJECTION, SOLUTION INTRAVENOUS; SUBCUTANEOUS
Status: DISCONTINUED | OUTPATIENT
Start: 2025-04-26 | End: 2025-04-26 | Stop reason: HOSPADM

## 2025-04-26 RX ORDER — DEXTROSE MONOHYDRATE 100 MG/ML
INJECTION, SOLUTION INTRAVENOUS CONTINUOUS PRN
Status: DISCONTINUED | OUTPATIENT
Start: 2025-04-26 | End: 2025-04-26 | Stop reason: HOSPADM

## 2025-04-26 RX ORDER — GLUCAGON 1 MG/ML
1 KIT INJECTION PRN
Status: DISCONTINUED | OUTPATIENT
Start: 2025-04-26 | End: 2025-04-26 | Stop reason: HOSPADM

## 2025-04-26 RX ORDER — COSYNTROPIN 0.25 MG/ML
250 INJECTION, POWDER, FOR SOLUTION INTRAMUSCULAR; INTRAVENOUS ONCE
Status: COMPLETED | OUTPATIENT
Start: 2025-04-26 | End: 2025-04-26

## 2025-04-26 RX ADMIN — COSYNTROPIN 250 MCG: 0.25 INJECTION, POWDER, LYOPHILIZED, FOR SOLUTION INTRAMUSCULAR; INTRAVENOUS at 10:21

## 2025-04-26 RX ADMIN — SODIUM CHLORIDE, PRESERVATIVE FREE 10 ML: 5 INJECTION INTRAVENOUS at 10:27

## 2025-04-26 RX ADMIN — DULOXETINE HYDROCHLORIDE 90 MG: 60 CAPSULE, DELAYED RELEASE ORAL at 10:26

## 2025-04-26 RX ADMIN — METOCLOPRAMIDE 10 MG: 10 TABLET ORAL at 09:33

## 2025-04-26 RX ADMIN — GABAPENTIN 600 MG: 600 TABLET, FILM COATED ORAL at 09:33

## 2025-04-26 RX ADMIN — DULOXETINE HYDROCHLORIDE 30 MG: 30 CAPSULE, DELAYED RELEASE ORAL at 09:33

## 2025-04-26 RX ADMIN — OXCARBAZEPINE 150 MG: 300 TABLET, FILM COATED ORAL at 09:34

## 2025-04-26 RX ADMIN — LACOSAMIDE 200 MG: 200 TABLET, FILM COATED ORAL at 09:33

## 2025-04-26 RX ADMIN — SUCRALFATE 1 G: 1 TABLET ORAL at 09:33

## 2025-04-26 RX ADMIN — INSULIN GLARGINE 36 UNITS: 100 INJECTION, SOLUTION SUBCUTANEOUS at 09:34

## 2025-04-26 RX ADMIN — ENOXAPARIN SODIUM 30 MG: 100 INJECTION SUBCUTANEOUS at 09:33

## 2025-04-26 ASSESSMENT — PAIN - FUNCTIONAL ASSESSMENT
PAIN_FUNCTIONAL_ASSESSMENT_SITE2: ACTIVITIES ARE NOT PREVENTED
PAIN_FUNCTIONAL_ASSESSMENT_SITE2: ACTIVITIES ARE NOT PREVENTED

## 2025-04-26 ASSESSMENT — PAIN SCALES - GENERAL
PAINLEVEL_OUTOF10: 6
PAINLEVEL_OUTOF10: 6

## 2025-04-26 ASSESSMENT — PAIN DESCRIPTION - DESCRIPTORS
DESCRIPTORS: ACHING
DESCRIPTORS_2: ACHING;DISCOMFORT
DESCRIPTORS_2: ACHING;DISCOMFORT
DESCRIPTORS: ACHING

## 2025-04-26 ASSESSMENT — PAIN DESCRIPTION - INTENSITY
RATING_2: 6
RATING_2: 6

## 2025-04-26 ASSESSMENT — PAIN DESCRIPTION - ORIENTATION
ORIENTATION_2: LEFT;RIGHT
ORIENTATION_2: LEFT;RIGHT
ORIENTATION: MID

## 2025-04-26 ASSESSMENT — PAIN DESCRIPTION - LOCATION
LOCATION_2: FOOT
LOCATION: ABDOMEN
LOCATION_2: FOOT
LOCATION: ABDOMEN

## 2025-04-26 NOTE — PLAN OF CARE
Problem: Chronic Conditions and Co-morbidities  Goal: Patient's chronic conditions and co-morbidity symptoms are monitored and maintained or improved  Outcome: Progressing  Flowsheets (Taken 4/26/2025 0600)  Care Plan - Patient's Chronic Conditions and Co-Morbidity Symptoms are Monitored and Maintained or Improved:   Monitor and assess patient's chronic conditions and comorbid symptoms for stability, deterioration, or improvement   Update acute care plan with appropriate goals if chronic or comorbid symptoms are exacerbated and prevent overall improvement and discharge   Collaborate with multidisciplinary team to address chronic and comorbid conditions and prevent exacerbation or deterioration     Problem: Discharge Planning  Goal: Discharge to home or other facility with appropriate resources  Outcome: Progressing  Flowsheets (Taken 4/26/2025 0600)  Discharge to home or other facility with appropriate resources: Identify barriers to discharge with patient and caregiver     Problem: Pain  Goal: Verbalizes/displays adequate comfort level or baseline comfort level  Outcome: Progressing  Flowsheets  Taken 4/26/2025 0600 by Rob Romo, RN  Verbalizes/displays adequate comfort level or baseline comfort level:   Encourage patient to monitor pain and request assistance   Assess pain using appropriate pain scale   Administer analgesics based on type and severity of pain and evaluate response   Implement non-pharmacological measures as appropriate and evaluate response   Notify Licensed Independent Practitioner if interventions unsuccessful or patient reports new pain  Taken 4/25/2025 1820 by Levi Sommers, RN  Verbalizes/displays adequate comfort level or baseline comfort level: Encourage patient to monitor pain and request assistance     Problem: Safety - Adult  Goal: Free from fall injury  Outcome: Progressing  Flowsheets (Taken 4/26/2025 0600)  Free From Fall Injury: Instruct family/caregiver on patient

## 2025-04-28 ENCOUNTER — CARE COORDINATION (OUTPATIENT)
Dept: CARE COORDINATION | Age: 55
End: 2025-04-28

## 2025-04-28 DIAGNOSIS — R55 SYNCOPE AND COLLAPSE: Primary | ICD-10-CM

## 2025-04-28 PROCEDURE — 1111F DSCHRG MED/CURRENT MED MERGE: CPT | Performed by: INTERNAL MEDICINE

## 2025-04-28 NOTE — CARE COORDINATION
Kesha Hutson MD Internal Medicine [phone]    5/13/2025 8:00 AM Leticia Ramos [facility] Internal Medicine [phone]    7/3/2025 10:00 AM Kesha Hutson MD Internal Medicine [phone]    9/8/2025 10:40 AM Kory Robles MD Nephrology [phone]            Care Transition Nurse provided contact information.  Plan for follow-up call in 2-5 days based on severity of symptoms and risk factors.  Plan for next call: symptom management-abdominal pain, nausea/vomiting, diarrhea, syncopal or near syncopal episodes, dizziness/lightheadedness, any new or worsening symptoms, review PCP appt  medication management-did provider adjust insulin       Lakia Jenkins RN

## 2025-04-28 NOTE — DISCHARGE SUMMARY
Hospital Medicine Discharge Summary      Patient Identification:   Nicolas Mackenzie   : 1970  MRN: [id number]   Account: [id number]      Patient's PCP: Kesha Hutson MD    Admit Date: 2025     Discharge Date: 2025 12:42 PM    Admitting Physician: No admitting provider for patient encounter.     Discharge Physician: ASHLEY Mars     Discharge Diagnoses:    Active Hospital Problems    Diagnosis Date Noted    Syncope and collapse [R55] 2025       The patient was seen and examined on day of discharge and this discharge summary is in conjunction with any daily progress note from day of discharge.    Hospital Course:   Nicolas Mackenzie is a 54 y.o. male admitted to [facility] on 2025 for syncope.        Initial H&P \"The patient is a 54 y.o. male who presents with an episode of syncope after having been discharged in the hospital in good condition 2025.  The patient had been hospitalized secondary to symptoms related to chronic gastroparesis secondary to poorly controlled diabetes mellitus type 2 the patient also has suspected autonomic dysfunction secondary to poorly controlled diabetes mellitus type 2, he was followed by endocrine services and previous hospital stay and required transient decrease in his Lantus dosing secondary to transient hypoglycemia, his constipation was treated, he had atypical chest pain and previous hospitalization that was negative for any acute coronary syndrome with cardiology having evaluated the patient he also had no episodes of generalized seizure despite having a history of chronic seizure disorder, on this hospitalization he has been reevaluated secondary having had an episode of syncope at home that was witnessed by family members, on my physical survey he has no tenderness to palpation in the cervical neck and he has no ecchymoses on his scalp as a precaution I will order CT of the head to ensure there is no intracranial

## 2025-04-29 ENCOUNTER — OFFICE VISIT (OUTPATIENT)
Dept: INTERNAL MEDICINE CLINIC | Age: 55
End: 2025-04-29

## 2025-04-29 VITALS
HEART RATE: 92 BPM | WEIGHT: 270.2 LBS | SYSTOLIC BLOOD PRESSURE: 120 MMHG | DIASTOLIC BLOOD PRESSURE: 76 MMHG | TEMPERATURE: 98 F | BODY MASS INDEX: 33.6 KG/M2 | HEIGHT: 75 IN

## 2025-04-29 DIAGNOSIS — R19.7 DIARRHEA, UNSPECIFIED TYPE: ICD-10-CM

## 2025-04-29 DIAGNOSIS — K31.84 GASTROPARESIS: ICD-10-CM

## 2025-04-29 DIAGNOSIS — K21.9 GASTROESOPHAGEAL REFLUX DISEASE WITHOUT ESOPHAGITIS: ICD-10-CM

## 2025-04-29 DIAGNOSIS — Z09 HOSPITAL DISCHARGE FOLLOW-UP: ICD-10-CM

## 2025-04-29 DIAGNOSIS — Z79.4 TYPE 2 DIABETES MELLITUS WITH DIABETIC NEUROPATHY, WITH LONG-TERM CURRENT USE OF INSULIN (HCC): Primary | ICD-10-CM

## 2025-04-29 DIAGNOSIS — R55 SYNCOPE, UNSPECIFIED SYNCOPE TYPE: ICD-10-CM

## 2025-04-29 DIAGNOSIS — E11.40 TYPE 2 DIABETES MELLITUS WITH DIABETIC NEUROPATHY, WITH LONG-TERM CURRENT USE OF INSULIN (HCC): Primary | ICD-10-CM

## 2025-04-29 RX ORDER — PANTOPRAZOLE SODIUM 40 MG/1
40 TABLET, DELAYED RELEASE ORAL 2 TIMES DAILY
Qty: 180 TABLET | Refills: 1 | Status: SHIPPED | OUTPATIENT
Start: 2025-04-29

## 2025-04-29 RX ORDER — FENTANYL 12.5 UG/1
PATCH TRANSDERMAL
COMMUNITY
Start: 2025-04-28

## 2025-04-29 NOTE — TELEPHONE ENCOUNTER
I did discuss with patient today.  He decides what to take regarding his insulin.  Dr. Summers decreased insulin in hospital dramatically to 36 Units BID, but patient decided to increase to 80 Units BID (previous home dose).  BP dropping with standing is a chronic issue - no longer on BP medication.  Refuses to wear compression hose due to neuropathy/pain.  Volital BP so not a good candidate for Midodrine.

## 2025-04-29 NOTE — PROGRESS NOTES
Post-Discharge Transitional Care  Follow Up      Nicolas Mackenzie   YOB: 1970    Date of Office Visit:  4/29/2025    Follow up of 2 hospitalizations:  Date of Hospital Admission: 4/17/25  Date of Hospital Discharge: 4/24/25    Date of Hospital Admission: 4/25/25  Date of Hospital Discharge: 4/26/25  Risk of hospital readmission (high >=14%. Medium >=10%) :Readmission Risk Score: 12.2    Care management risk score Rising risk (score 2-5) and Complex Care (Scores >=6): No Risk Score On File     Non face to face  following discharge, date last encounter closed (first attempt may have been earlier): 04/28/2025    Call initiated 2 business days of discharge: Yes    ASSESSMENT/PLAN:   Type 2 diabetes mellitus with diabetic neuropathy, with long-term current use of insulin (HCC)  Syncope, unspecified syncope type  Gastroparesis  Gastroesophageal reflux disease without esophagitis  -     pantoprazole (PROTONIX) 40 MG tablet; Take 1 tablet by mouth 2 times daily, Disp-180 tablet, R-1Normal  Diarrhea, unspecified type  Hospital discharge follow-up  -     MS DISCHARGE MEDS RECONCILED W/ CURRENT OUTPATIENT MED LIST      Medical Decision Making: moderate complexity  No follow-ups on file.       See plan below.  Keep follow up 7/3/25.    Subjective:   HPI:  Follow up of Hospital problems/diagnosis(es):   1st Hospitalization:  Atypical chest pain due to gastroparesis - recommended Reglan, small frequent meals, limit opioids.  He has appt at  5/8/25 for second opinion.  DM - labile FSBS due to poor intake/gastroparesis- discharged on Basaglar 36 Units BID, Humalog 12 Units plus SSI TID.  Ruled out Cushing's syndrome.  Constipation - multiple treatments ordered but patient refused many - he reportedly had 2 BM prior to discharge.  Medication changes - stopped Strattera, losartan, Percocet, insulin changes as above.    2nd Hospitalization:  Syncope - likely due to autonomic dysfunction, no seizure activity noted at

## 2025-04-30 ENCOUNTER — CARE COORDINATION (OUTPATIENT)
Dept: CARE COORDINATION | Age: 55
End: 2025-04-30

## 2025-04-30 NOTE — CARE COORDINATION
Care Transitions Note    Follow Up Call     Patient Current Location:  Home: [address] [place] [address]    Care Transition Nurse contacted the patient by telephone. Verified name and  as identifiers.    Additional needs identified to be addressed with provider   No needs identified                 Method of communication with provider: none.    Care Summary Note: Contacted pt for care transition follow up.  Hector states he had his follow up with PCP yesterday.  Continues to have severe abdominal pain.  Reports he had a really bad day yesterday.  States he vomited all day yesterday.  Was unable to eat.  Blood sugar was 54.  He states his PCP is aware of blood sugars dropping.  Reports today his blood sugar was 194.  Pt is taking reglan.  Reports he stopped taking all of his pain medications.  States he was prescribed the Fentanyl patches but has not used them.  Pt continues to have diarrhea which he states is d/t IBS.  Pt states he has an appt with the local GI tomorrow but also continues to follow up with  GI on 25.  Pt denies having any further syncopal or near syncopal.  Denies having any lightheadedness/dizziness today.  May still have symptoms when BP drops.  He is being very careful and changing positions slowly.  He is aware of when to contact providers.  No questions or needs at this time.      Plan of care updates since last contact:  Review of patient management of conditions/medications: GERD, Gastroparesis, IBS       Advance Care Planning:   Does patient have an Advance Directive: Not on file; patient encouraged to bring existing ACP documents to a [facility] facility..    Medication Review:  Medications changed since last call, reviewed today.     Remote Patient Monitoring:  Offered patient enrollment in the Remote Patient Monitoring (RPM) program for in-home monitoring: Patient is not eligible for RPM program because: patient does not have qualifying diagnosis.    Assessments:  Care Transitions

## 2025-05-05 ENCOUNTER — CARE COORDINATION (OUTPATIENT)
Dept: CARE COORDINATION | Age: 55
End: 2025-05-05

## 2025-05-05 NOTE — CARE COORDINATION
Care Transitions Note    Follow Up Call     Attempted to reach patient for transitions of care follow up.  Unable to reach patient.      Outreach Attempts:   HIPAA compliant voicemail left for patient.   CHARMS PPEChart message sent.     Care Summary Note: day #1 no response to CTN outreach.    Follow Up Appointment:   Future Appointments         Provider Specialty Dept Phone    5/13/2025 8:00 AM Leticia Ramos [facility] Internal Medicine [phone]    7/3/2025 10:00 AM Kesha Hutson MD Internal Medicine [phone]    9/8/2025 10:40 AM Kory Robles MD Nephrology [phone]            Plan for follow-up on next business day.  based on severity of symptoms and risk factors. Plan for next call: symptom management-abd pain, dehydration, n/v/, syncope  self management-BLOOD SUGAR, BP  follow-up appointment-KARINA Kim RN

## 2025-05-06 ENCOUNTER — CARE COORDINATION (OUTPATIENT)
Dept: CARE COORDINATION | Age: 55
End: 2025-05-06

## 2025-05-06 NOTE — CARE COORDINATION
Care Transitions Note    Follow Up Call     Patient Current Location:  Home: [address] [place] [address]    Care Transition Nurse contacted the patient by telephone. Verified name and  as identifiers.    Additional needs identified to be addressed with provider   No needs identified                 Method of communication with provider: none.    Care Summary Note: CTN call to Hector today and he says he does not feel good and has not felt good 1 day since d/c'd from [facility].  C/o ongoing abd pain, n/v/d, fatigue, lightheadedness.  Denies syncope, sob, chest pain, dizziness, fever, chills.  Wt.=256 down BP=80's/50's  Has not tried Fentanyl patches for abd pain says they might make gastroparesis worse.  BLOOD SUGAR= has sugary drinks on hand for hypoglycemia.  Eating very little d/t n/v and abd pain. Taking Phenergan as directed-helps some.  Has not seen GI in [place] but has GI appt. At [facility] (gastroparesis clinic). His wife will take him.  DM 25  C/o RLE swelling. Says has this on & off. Recommend elevating when sitting and compression socks. Says he will elevate but does not like to wear comp socks.  Drinking fluids just ok. Denies problems w/ urination.  Still planning on [place] trip . Wife rented a scooter for him.  No other concerns voiced at this time. Will continue to follow.        Plan of care updates since last contact:  Review of patient management of conditions/medications: gastroparesis, DM->GI [facility], monitor BLOOD SUGAR, take antiemetic as ordered.       Advance Care Planning:   Does patient have an Advance Directive: Not on file; patient encouraged to bring existing ACP documents to a [facility] facility..    Medication Review:  No changes since last call.     Remote Patient Monitoring:  Offered patient enrollment in the Remote Patient Monitoring (RPM) program for in-home monitoring: Yes, but did not enroll at this time: already enrolled in another RPM program.    Assessments:  Care

## 2025-05-12 ENCOUNTER — CARE COORDINATION (OUTPATIENT)
Dept: CARE COORDINATION | Age: 55
End: 2025-05-12

## 2025-05-12 NOTE — CARE COORDINATION
Care Transitions Note    Follow Up Call     Patient Current Location:  Home: [address] [place] [address]    Care Transition Nurse contacted the patient by telephone. Verified name and  as identifiers.    Additional needs identified to be addressed with provider   No needs identified                 Method of communication with provider: none.    Care Summary Note: CTN call to Hector today and he says he feels the same as last week. Was in bed all day yesterday w/ n/v/d, abd pain, dizziness.  BP still running low 80's/50's BLOOD SUGAR=318 this am (has not eaten or drank anything yet today) wt.=254 unchanged. Will try to hydrate today.  [facility] ->scheduled pt. For procedure 25  Wife will take him. He is not to take Reglan. Says [facility] ordered another med but waiting on insurance approval. He doesn't remember the name of it.  Says is going to [place] -. [place]ell rented scooter.  Denies sob, chest pain, fever, chills, malaise, syncope.  DM 25  Denies need for transportation.  Denies problems w/ urination.  No other concerns voiced at this time. Will continue to follow.      Plan of care updates since last contact:  Review of patient management of conditions/medications: dehydration, gastroparesis->monitor n/v/d, hydrate; GI [facility] scheduled procedure 25       Advance Care Planning:   Does patient have an Advance Directive: reviewed during previous call, see note. .    Medication Review:  Medications changed since last call, reviewed today.     Remote Patient Monitoring:  Offered patient enrollment in the Remote Patient Monitoring (RPM) program for in-home monitoring: Yes, but did not enroll at this time: already monitoring with home equipment.    Assessments:  Care Transitions ED Follow Up    Care Transitions Interventions     Other Services: Completed (Comment: Glucerfreddie melendezpons, Dial A Dietician, and Diabetic Diet Information Mailed)      Transportation Support: Declined   Diabetes Education:

## 2025-05-17 PROBLEM — E86.0 DEHYDRATION: Status: RESOLVED | Noted: 2025-04-17 | Resolved: 2025-05-17

## 2025-05-19 ENCOUNTER — CARE COORDINATION (OUTPATIENT)
Dept: CARE COORDINATION | Age: 55
End: 2025-05-19

## 2025-05-19 NOTE — CARE COORDINATION
Care Transitions Note    Final Call     Patient Current Location:  Home: [address] [place] [address] [place] [address]    Care Transition Nurse contacted the patient by telephone. Verified name and  as identifiers.    Patient graduated from the Care Transitions program on 25.  Patient/family verbalizes confidence in the ability to self-manage at this time..      Advance Care Planning:   Does patient have an Advance Directive: Not on file; patient encouraged to bring existing ACP documents to a [facility] facility..    Handoff:   Patient was not referred to the ACM team due to no additional needs identified.       Care Summary Note: CTN call to Hector today and he says he feels the same. C/o n/v at times, dizziness, abd pain after eating. Taking Phenergan w/ some relief. Using Fentanyl patch w/ no relief he says.  Wt.=not taking BP=80's/60's  He says sometimes lower. Fall precautions in place.  BLOOD CHWJA=209 says he has not eaten since yesterday.  Drinking fluids ok. Denies problems w/ urination/bowels.   DM [facility] 25  GI [facility] scheduled procedure 25  Denies need for transportation. Will be going to [place] 25. No other concerns voiced at this time, CTN informed him of final call.     Assessments:  Care Transitions Subsequent and Final Call    Subsequent and Final Calls  Do you have any ongoing symptoms?: Yes  Onset of Patient-reported symptoms: Other  Patient-reported symptoms: Nausea, Vomiting, Abdominal Pain  Interventions for patient-reported symptoms: Notified PCP/Physician  Have your medications changed?: No  Do you have any questions related to your medications?: No  Do you currently have any active services?: No  Do you have any needs or concerns that I can assist you with?: No  Identified Barriers: Lack of Education, Other  Care Transitions Interventions     Other Services: Completed (Comment: Glucerfreddie coupons, Dial A Dietician, and Diabetic Diet Information Mailed)      Transportation Support: Declined

## 2025-05-21 ENCOUNTER — OFFICE VISIT (OUTPATIENT)
Dept: INTERNAL MEDICINE CLINIC | Age: 55
End: 2025-05-21

## 2025-05-21 DIAGNOSIS — E11.40 TYPE 2 DIABETES MELLITUS WITH DIABETIC NEUROPATHY, WITH LONG-TERM CURRENT USE OF INSULIN (HCC): Primary | ICD-10-CM

## 2025-05-21 DIAGNOSIS — Z79.4 TYPE 2 DIABETES MELLITUS WITH DIABETIC NEUROPATHY, WITH LONG-TERM CURRENT USE OF INSULIN (HCC): Primary | ICD-10-CM

## 2025-05-21 RX ORDER — INSULIN GLARGINE 100 [IU]/ML
80 INJECTION, SOLUTION SUBCUTANEOUS 2 TIMES DAILY
Qty: 60 ML | Refills: 1 | Status: SHIPPED | OUTPATIENT
Start: 2025-05-21

## 2025-05-21 NOTE — PROGRESS NOTES
preferred pharmacy. Hector and I renewed his [employer] application online- [id number] .      Curriculum Area Focus: Glucose checks/goals, Sick day & DKA management, and Medication adherence  DSME PLAN:     Patient Instructions   Restart Basaglar/Lantus 60 units twice daily for 5 days - if your sugars are still high, increase to the full 80 units twice daily again    2. We will submit your [employer] Cares Application    3. Time to reschedule your eye exam    4. Continue to check BG 2-3x daily    5. Stop in for a meter download 6/2 or 6/3     Goals Addressed    None          Meter download, medications, PMH and nursing assessment reviewed.  Nicolas Mackenzie states He is willing to participate in this plan of care and verbalized understanding of all instructions provided. Teach back used to verify comprehension.      Follow-up: 2 week tele    Total time involved in direct patient education: 60 minutes.   Individual Education appointment justified due to: Class Availability    For Pharmacy Admin Tracking Only    Program: Medical Group  CPA in place:  Yes  Recommendation Provided To: Patient/Caregiver: 1 via In person  Intervention Detail: Benefit Assistance  Intervention Accepted By: Patient/Caregiver: 1  Gap Closed?: No   Time Spent (min): 60        Leticia Ramos, PharmD, BCPS, BC-ADM  Internal Medicine Clinical Pharmacist  [phone]

## 2025-05-21 NOTE — PATIENT INSTRUCTIONS
Restart Basaglar/Lantus 60 units twice daily for 5 days - if your sugars are still high, increase to the full 80 units twice daily again    2. We will submit your [email or web address] Application    3. Time to reschedule your eye exam    4. Continue to check BG 2-3x daily    5. Stop in for a meter download 6/2 or 6/3

## 2025-05-22 VITALS
WEIGHT: 269 LBS | HEART RATE: 81 BPM | BODY MASS INDEX: 33.62 KG/M2 | SYSTOLIC BLOOD PRESSURE: 168 MMHG | DIASTOLIC BLOOD PRESSURE: 95 MMHG | TEMPERATURE: 98.1 F

## 2025-05-29 ENCOUNTER — TELEPHONE (OUTPATIENT)
Dept: INTERNAL MEDICINE CLINIC | Age: 55
End: 2025-05-29

## 2025-05-29 DIAGNOSIS — Z79.4 TYPE 2 DIABETES MELLITUS WITH DIABETIC NEUROPATHY, WITH LONG-TERM CURRENT USE OF INSULIN (HCC): Primary | ICD-10-CM

## 2025-05-29 DIAGNOSIS — E11.40 TYPE 2 DIABETES MELLITUS WITH DIABETIC NEUROPATHY, WITH LONG-TERM CURRENT USE OF INSULIN (HCC): Primary | ICD-10-CM

## 2025-05-29 NOTE — TELEPHONE ENCOUNTER
We now have an electronic referral.  Please fill in all the areas on the attached referral and send back.   Thank you

## 2025-06-27 ENCOUNTER — TELEPHONE (OUTPATIENT)
Dept: INTERNAL MEDICINE CLINIC | Age: 55
End: 2025-06-27

## 2025-06-27 NOTE — TELEPHONE ENCOUNTER
Neurologist in [place] told him to call us and have you order a CT of his head due to seizures. I let him know it was Friday and you probably would not get back to me today. He says you usually call him.

## 2025-06-28 NOTE — TELEPHONE ENCOUNTER
Dr. Holder wants him to go to ED to be evaluated.  The patient doesn't want to.  I sent him a Dazo message- instructed him to go to ED as instructed.  Please call on Monday and get update on condition.  He can make appt with me/resident clinic to discuss if he would like.

## 2025-07-22 ENCOUNTER — OFFICE VISIT (OUTPATIENT)
Dept: INTERNAL MEDICINE CLINIC | Age: 55
End: 2025-07-22

## 2025-07-22 VITALS
DIASTOLIC BLOOD PRESSURE: 84 MMHG | TEMPERATURE: 98.1 F | BODY MASS INDEX: 34.69 KG/M2 | WEIGHT: 279 LBS | SYSTOLIC BLOOD PRESSURE: 122 MMHG | HEART RATE: 80 BPM | HEIGHT: 75 IN

## 2025-07-22 DIAGNOSIS — K21.9 GASTROESOPHAGEAL REFLUX DISEASE WITHOUT ESOPHAGITIS: ICD-10-CM

## 2025-07-22 DIAGNOSIS — G25.81 RESTLESS LEG SYNDROME: ICD-10-CM

## 2025-07-22 DIAGNOSIS — E55.9 VITAMIN D DEFICIENCY: ICD-10-CM

## 2025-07-22 DIAGNOSIS — Z79.4 TYPE 2 DIABETES MELLITUS WITH DIABETIC NEUROPATHY, WITH LONG-TERM CURRENT USE OF INSULIN (HCC): Primary | ICD-10-CM

## 2025-07-22 DIAGNOSIS — G47.33 OSA (OBSTRUCTIVE SLEEP APNEA): ICD-10-CM

## 2025-07-22 DIAGNOSIS — R56.9 SEIZURES (HCC): ICD-10-CM

## 2025-07-22 DIAGNOSIS — E11.40 TYPE 2 DIABETES MELLITUS WITH DIABETIC NEUROPATHY, WITH LONG-TERM CURRENT USE OF INSULIN (HCC): Primary | ICD-10-CM

## 2025-07-22 RX ORDER — PANTOPRAZOLE SODIUM 40 MG/1
40 TABLET, DELAYED RELEASE ORAL 2 TIMES DAILY
Qty: 180 TABLET | Refills: 1 | Status: CANCELLED | OUTPATIENT
Start: 2025-07-22

## 2025-07-26 RX ORDER — INSULIN GLARGINE 100 [IU]/ML
80 INJECTION, SOLUTION SUBCUTANEOUS 2 TIMES DAILY
Qty: 60 ML | Refills: 1 | Status: SHIPPED | OUTPATIENT
Start: 2025-07-26

## 2025-07-26 RX ORDER — GABAPENTIN 600 MG/1
600 TABLET ORAL 3 TIMES DAILY
Qty: 90 TABLET | Refills: 2 | Status: SHIPPED | OUTPATIENT
Start: 2025-07-26 | End: 2025-10-24

## 2025-07-26 RX ORDER — INSULIN LISPRO 100 [IU]/ML
INJECTION, SOLUTION INTRAVENOUS; SUBCUTANEOUS
Qty: 60 ADJUSTABLE DOSE PRE-FILLED PEN SYRINGE | Refills: 2 | Status: SHIPPED | OUTPATIENT
Start: 2025-07-26

## 2025-07-26 RX ORDER — ROPINIROLE 1 MG/1
TABLET, FILM COATED ORAL
Qty: 135 TABLET | Refills: 1 | Status: SHIPPED | OUTPATIENT
Start: 2025-07-26

## 2025-08-20 ENCOUNTER — LAB (OUTPATIENT)
Dept: LAB | Age: 55
End: 2025-08-20

## 2025-09-03 ENCOUNTER — LAB (OUTPATIENT)
Dept: LAB | Age: 55
End: 2025-09-03

## 2025-09-03 DIAGNOSIS — N18.2 CKD (CHRONIC KIDNEY DISEASE), STAGE II: ICD-10-CM

## 2025-09-03 DIAGNOSIS — R80.9 MICROALBUMINURIA: ICD-10-CM

## 2025-09-03 LAB
ANION GAP SERPL CALC-SCNC: 11 MEQ/L (ref 8–16)
BUN SERPL-MCNC: 14 MG/DL (ref 8–23)
CALCIUM SERPL-MCNC: 9.7 MG/DL (ref 8.5–10.5)
CHLORIDE SERPL-SCNC: 97 MEQ/L (ref 98–111)
CO2 SERPL-SCNC: 25 MEQ/L (ref 22–29)
CREAT SERPL-MCNC: 0.9 MG/DL (ref 0.7–1.2)
CREAT UR-MCNC: 89.7 MG/DL
GFR SERPL CREATININE-BSD FRML MDRD: > 90 ML/MIN/1.73M2
GLUCOSE SERPL-MCNC: 466 MG/DL (ref 74–109)
MICROALBUMIN UR-MCNC: 21.7 MG/DL
MICROALBUMIN/CREAT RATIO PNL UR: 242 MG/G (ref 0–30)
POTASSIUM SERPL-SCNC: 4.7 MEQ/L (ref 3.5–5.2)
SODIUM SERPL-SCNC: 133 MEQ/L (ref 135–145)

## (undated) DEVICE — CONTAINER,SPECIMEN,PNEU TUBE,4OZ,OR STRL: Brand: MEDLINE

## (undated) DEVICE — GLOVE SURG SZ 65 THK91MIL LTX FREE SYN POLYISOPRENE

## (undated) DEVICE — DISSECTOR ENDO L13CM CVD JAW CRDLSS SONICISION

## (undated) DEVICE — TAPE,ELASTIC,FOAM,CURAD,4"X5.5YD,LF: Brand: CURAD

## (undated) DEVICE — EMG TUBE 8229708 NIM TRIVANTAGE 8.0MM ID: Brand: NIM TRIVANTAGE™

## (undated) DEVICE — LINER SUCT CANSTR 1500CC SEMI RIG W/ POR HYDROPHOBIC SHUT

## (undated) DEVICE — GLOVE ORTHO 8   MSG9480

## (undated) DEVICE — GLOVE ORANGE PI 7   MSG9070

## (undated) DEVICE — SUTURE MCRYL SZ 3-0 L27IN ABSRB UD L26MM SH 1/2 CIR Y416H

## (undated) DEVICE — GLOVE ORANGE PI 7 1/2   MSG9075

## (undated) DEVICE — INTENDED FOR TISSUE SEPARATION, AND OTHER PROCEDURES THAT REQUIRE A SHARP SURGICAL BLADE TO PUNCTURE OR CUT.: Brand: BARD-PARKER ® CARBON RIB-BACK BLADES

## (undated) DEVICE — GLOVE SURG SZ 75 L12IN FNGR THK94MIL TRNSLUC YEL LTX

## (undated) DEVICE — YANKAUER,BULB TIP,W/O VENT,RIGID,STERILE: Brand: MEDLINE

## (undated) DEVICE — 3M™ IOBAN™ 2 ANTIMICROBIAL INCISE DRAPE 6650EZ: Brand: IOBAN™ 2

## (undated) DEVICE — COVER ARMBRD W13XL28.5IN IMPERV BLU FOR OP RM

## (undated) DEVICE — SUTURE MCRYL SZ 3-0 L27IN ABSRB UD L24MM PS-1 3/8 CIR PRIM Y936H

## (undated) DEVICE — COAGULATOR SUCT 10FR L6IN HND FT SWCH VALLEYLAB

## (undated) DEVICE — SPONGE GZ W4XL4IN COT 12 PLY TYP VII WVN C FLD DSGN

## (undated) DEVICE — CATHETER ETER IV 22GA L1IN POLYUR STR RADPQ INTROCAN SFTY

## (undated) DEVICE — DISSECTOR ENDOSCP L21CM TIP CURVATURE 40DEG FN CRV JAW VES

## (undated) DEVICE — APPLIER CLP L9.375IN APER 2.1MM CLS L3.8MM 20 SM TI CLP

## (undated) DEVICE — IV START KIT: Brand: MEDLINE INDUSTRIES, INC.

## (undated) DEVICE — APPLIER LIG CLP M L11IN TI STR RNG HNDL FOR 20 CLP DISP

## (undated) DEVICE — GOWN,SIRUS,NONRNF,SETINSLV,XL,20/CS: Brand: MEDLINE

## (undated) DEVICE — TUBING, SUCTION, 1/4" X 20', STRAIGHT: Brand: MEDLINE INDUSTRIES, INC.

## (undated) DEVICE — CORD ES L12FT BPLR FRCP

## (undated) DEVICE — APPLIER CLP L9.38IN M LIG TI DISP STR RNG HNDL LIGACLP

## (undated) DEVICE — SUTURE PERMAHAND SZ 4-0 L18IN NONABSORBABLE BLK SILK BRAID A183H

## (undated) DEVICE — SUTURE VCRL SZ 3-0 L18IN ABSRB VLT SUTUPAK PRECUT W/O NDL J104T

## (undated) DEVICE — DRAIN,WOUND,15FR,3/16,FULL-FLUTED: Brand: MEDLINE

## (undated) DEVICE — SPONGE,PEANUT,XRAY,ST,SM,3/8",5/CARD: Brand: MEDLINE INDUSTRIES, INC.

## (undated) DEVICE — GOWN,SIRUS,NON REINFRCD,LARGE,SET IN SL: Brand: MEDLINE

## (undated) DEVICE — ADHESIVE SKIN CLSR 0.7ML TOP DERMBND ADV

## (undated) DEVICE — CO2 CANNULA,SUPERSOFT, ADLT,7'O2,7'CO2: Brand: MEDLINE

## (undated) DEVICE — TUBING IV STOPCOCK 48 CM 3 W

## (undated) DEVICE — BAG SPEC REM 224ML W4XL6IN DIA10MM 1 HND GYN DISP ENDOPCH

## (undated) DEVICE — TOTAL TRAY, DB, 100% SILI FOLEY, 16FR 10: Brand: MEDLINE

## (undated) DEVICE — SET ADMIN 25ML L117IN PMP MOD CK VLV RLER CLMP 2 SMRTSITE

## (undated) DEVICE — SET LNR RED GRN W/ BASE CLEANASCOPE

## (undated) DEVICE — SUTURE MCRYL SZ 4-0 L27IN ABSRB UD L19MM PS-2 1/2 CIR PRIM Y426H

## (undated) DEVICE — 6619 2 PTNT ISO SYS INCISE AREA&LT;(&GT;&&LT;)&GT;P: Brand: STERI-DRAPE™ IOBAN™ 2

## (undated) DEVICE — 3M™ STERI-DRAPE™ INSTRUMENT POUCH 1018: Brand: STERI-DRAPE™

## (undated) DEVICE — SUTURE VCRL SZ 4-0 L27IN ABSRB UD L19MM FS-2 3/8 CIR REV J422H

## (undated) DEVICE — SUTURE VCRL SZ 3-0 L18IN ABSRB VLT L26MM SH 1/2 CIR J774D

## (undated) DEVICE — GAUZE,SPONGE,8"X4",12PLY,XRAY,STRL,LF: Brand: MEDLINE

## (undated) DEVICE — PROBE 8225101 5PK STD PRASS FL TIP ROHS

## (undated) DEVICE — KIT INF CTRL 2OZ LUB TBNG L12FT DBL END BRSH SYR OP4

## (undated) DEVICE — AGENT HEMSTAT W2XL4IN OXIDIZED REGENERATED CELOS ABSRB SFT

## (undated) DEVICE — CONMED SCOPE SAVER BITE BLOCK, 20X27 MM: Brand: SCOPE SAVER

## (undated) DEVICE — SOLUTION SCRB 4OZ 4% CHG H2O AIDED FOR PREOPERATIVE SKIN

## (undated) DEVICE — BREAST HERNIA PACK: Brand: MEDLINE INDUSTRIES, INC.

## (undated) DEVICE — SOLUTION IV 1000ML 0.45% SOD CHL PH 5 INJ USP VIAFLX PLAS

## (undated) DEVICE — PADDING,UNDERCAST,COTTON, 4"X4YD STERILE: Brand: MEDLINE

## (undated) DEVICE — 4-PORT MANIFOLD: Brand: NEPTUNE 2

## (undated) DEVICE — GLOVE SURG SZ 75 L12IN FNGR THK94MIL STD WHT ISOLEX LTX

## (undated) DEVICE — DRAPE,INSTRUMENT,MAGNETIC,10X16: Brand: MEDLINE